# Patient Record
Sex: FEMALE | Race: WHITE | NOT HISPANIC OR LATINO
[De-identification: names, ages, dates, MRNs, and addresses within clinical notes are randomized per-mention and may not be internally consistent; named-entity substitution may affect disease eponyms.]

---

## 2017-01-04 ENCOUNTER — APPOINTMENT (OUTPATIENT)
Dept: CARDIOTHORACIC SURGERY | Facility: CLINIC | Age: 77
End: 2017-01-04

## 2017-01-04 VITALS
TEMPERATURE: 97.3 F | DIASTOLIC BLOOD PRESSURE: 61 MMHG | BODY MASS INDEX: 50.85 KG/M2 | SYSTOLIC BLOOD PRESSURE: 132 MMHG | HEART RATE: 64 BPM | OXYGEN SATURATION: 95 % | HEIGHT: 63 IN | WEIGHT: 287 LBS | RESPIRATION RATE: 16 BRPM

## 2017-01-12 ENCOUNTER — APPOINTMENT (OUTPATIENT)
Dept: CARDIOLOGY | Facility: CLINIC | Age: 77
End: 2017-01-12

## 2017-01-12 ENCOUNTER — APPOINTMENT (OUTPATIENT)
Dept: ELECTROPHYSIOLOGY | Facility: CLINIC | Age: 77
End: 2017-01-12

## 2017-01-12 VITALS — SYSTOLIC BLOOD PRESSURE: 156 MMHG | DIASTOLIC BLOOD PRESSURE: 56 MMHG | HEART RATE: 63 BPM

## 2017-01-12 RX ORDER — DOCUSATE SODIUM 100 MG/1
100 CAPSULE ORAL
Qty: 90 | Refills: 0 | Status: DISCONTINUED | COMMUNITY
Start: 2016-12-26

## 2017-01-12 RX ORDER — ACETAMINOPHEN 500 MG/1
500 TABLET, COATED ORAL
Refills: 0 | Status: ACTIVE | COMMUNITY

## 2017-01-27 ENCOUNTER — APPOINTMENT (OUTPATIENT)
Dept: CV DIAGNOSITCS | Facility: HOSPITAL | Age: 77
End: 2017-01-27

## 2017-01-27 ENCOUNTER — OUTPATIENT (OUTPATIENT)
Dept: OUTPATIENT SERVICES | Facility: HOSPITAL | Age: 77
LOS: 1 days | End: 2017-01-27
Payer: MEDICARE

## 2017-01-27 ENCOUNTER — NON-APPOINTMENT (OUTPATIENT)
Age: 77
End: 2017-01-27

## 2017-01-27 ENCOUNTER — APPOINTMENT (OUTPATIENT)
Dept: CARDIOLOGY | Facility: CLINIC | Age: 77
End: 2017-01-27

## 2017-01-27 VITALS
SYSTOLIC BLOOD PRESSURE: 136 MMHG | DIASTOLIC BLOOD PRESSURE: 78 MMHG | WEIGHT: 287 LBS | HEIGHT: 63 IN | OXYGEN SATURATION: 94 % | HEART RATE: 63 BPM | BODY MASS INDEX: 50.85 KG/M2

## 2017-01-27 DIAGNOSIS — Z98.890 OTHER SPECIFIED POSTPROCEDURAL STATES: Chronic | ICD-10-CM

## 2017-01-27 DIAGNOSIS — I35.0 NONRHEUMATIC AORTIC (VALVE) STENOSIS: ICD-10-CM

## 2017-01-27 PROCEDURE — 93306 TTE W/DOPPLER COMPLETE: CPT

## 2017-01-27 PROCEDURE — 93306 TTE W/DOPPLER COMPLETE: CPT | Mod: 26

## 2017-02-01 ENCOUNTER — MEDICATION RENEWAL (OUTPATIENT)
Age: 77
End: 2017-02-01

## 2017-02-03 ENCOUNTER — APPOINTMENT (OUTPATIENT)
Dept: ELECTROPHYSIOLOGY | Facility: CLINIC | Age: 77
End: 2017-02-03

## 2017-02-03 DIAGNOSIS — I44.0 ATRIOVENTRICULAR BLOCK, FIRST DEGREE: ICD-10-CM

## 2017-05-04 ENCOUNTER — RX RENEWAL (OUTPATIENT)
Age: 77
End: 2017-05-04

## 2017-05-11 ENCOUNTER — NON-APPOINTMENT (OUTPATIENT)
Age: 77
End: 2017-05-11

## 2017-05-11 ENCOUNTER — APPOINTMENT (OUTPATIENT)
Dept: CARDIOLOGY | Facility: CLINIC | Age: 77
End: 2017-05-11

## 2017-05-11 ENCOUNTER — APPOINTMENT (OUTPATIENT)
Dept: ELECTROPHYSIOLOGY | Facility: CLINIC | Age: 77
End: 2017-05-11

## 2017-05-11 VITALS — WEIGHT: 276.06 LBS | OXYGEN SATURATION: 97 % | BODY MASS INDEX: 48.91 KG/M2 | HEART RATE: 62 BPM | HEIGHT: 63 IN

## 2017-05-11 VITALS — DIASTOLIC BLOOD PRESSURE: 60 MMHG | SYSTOLIC BLOOD PRESSURE: 130 MMHG

## 2017-07-27 ENCOUNTER — APPOINTMENT (OUTPATIENT)
Dept: PULMONOLOGY | Facility: CLINIC | Age: 77
End: 2017-07-27
Payer: MEDICARE

## 2017-07-27 VITALS
OXYGEN SATURATION: 96 % | HEART RATE: 65 BPM | HEIGHT: 61 IN | DIASTOLIC BLOOD PRESSURE: 60 MMHG | RESPIRATION RATE: 16 BRPM | WEIGHT: 275 LBS | BODY MASS INDEX: 51.92 KG/M2 | SYSTOLIC BLOOD PRESSURE: 130 MMHG

## 2017-07-27 DIAGNOSIS — K21.9 GASTRO-ESOPHAGEAL REFLUX DISEASE W/OUT ESOPHAGITIS: ICD-10-CM

## 2017-07-27 DIAGNOSIS — J45.909 UNSPECIFIED ASTHMA, UNCOMPLICATED: ICD-10-CM

## 2017-07-27 DIAGNOSIS — J30.9 ALLERGIC RHINITIS, UNSPECIFIED: ICD-10-CM

## 2017-07-27 DIAGNOSIS — G47.33 OBSTRUCTIVE SLEEP APNEA (ADULT) (PEDIATRIC): ICD-10-CM

## 2017-07-27 DIAGNOSIS — R93.8 ABNORMAL FINDINGS ON DIAGNOSTIC IMAGING OF OTHER SPECIFIED BODY STRUCTURES: ICD-10-CM

## 2017-07-27 PROCEDURE — 94010 BREATHING CAPACITY TEST: CPT | Mod: 59

## 2017-07-27 PROCEDURE — 99214 OFFICE O/P EST MOD 30 MIN: CPT | Mod: 25

## 2017-07-27 PROCEDURE — 94620 PULMONARY STRESS TESTING SIMPLE: CPT

## 2017-07-27 PROCEDURE — 95012 NITRIC OXIDE EXP GAS DETER: CPT

## 2017-07-27 RX ORDER — PANTOPRAZOLE SODIUM 40 MG/1
40 TABLET, DELAYED RELEASE ORAL
Qty: 1 | Refills: 1 | Status: ACTIVE | COMMUNITY
Start: 2017-07-27 | End: 1900-01-01

## 2017-08-05 ENCOUNTER — RX RENEWAL (OUTPATIENT)
Age: 77
End: 2017-08-05

## 2017-08-09 ENCOUNTER — RX RENEWAL (OUTPATIENT)
Age: 77
End: 2017-08-09

## 2017-08-17 ENCOUNTER — FORM ENCOUNTER (OUTPATIENT)
Age: 77
End: 2017-08-17

## 2017-08-18 ENCOUNTER — APPOINTMENT (OUTPATIENT)
Dept: CT IMAGING | Facility: IMAGING CENTER | Age: 77
End: 2017-08-18
Payer: MEDICARE

## 2017-08-18 ENCOUNTER — OUTPATIENT (OUTPATIENT)
Dept: OUTPATIENT SERVICES | Facility: HOSPITAL | Age: 77
LOS: 1 days | End: 2017-08-18
Payer: MEDICARE

## 2017-08-18 DIAGNOSIS — Z98.890 OTHER SPECIFIED POSTPROCEDURAL STATES: Chronic | ICD-10-CM

## 2017-08-18 DIAGNOSIS — R93.8 ABNORMAL FINDINGS ON DIAGNOSTIC IMAGING OF OTHER SPECIFIED BODY STRUCTURES: ICD-10-CM

## 2017-08-18 PROCEDURE — 71250 CT THORAX DX C-: CPT

## 2017-08-18 PROCEDURE — 71250 CT THORAX DX C-: CPT | Mod: 26

## 2017-08-21 ENCOUNTER — APPOINTMENT (OUTPATIENT)
Dept: ELECTROPHYSIOLOGY | Facility: CLINIC | Age: 77
End: 2017-08-21
Payer: MEDICARE

## 2017-08-21 VITALS — DIASTOLIC BLOOD PRESSURE: 60 MMHG | SYSTOLIC BLOOD PRESSURE: 132 MMHG | HEART RATE: 66 BPM

## 2017-08-21 PROCEDURE — 93280 PM DEVICE PROGR EVAL DUAL: CPT

## 2017-08-25 ENCOUNTER — RX RENEWAL (OUTPATIENT)
Age: 77
End: 2017-08-25

## 2017-10-05 ENCOUNTER — MEDICATION RENEWAL (OUTPATIENT)
Age: 77
End: 2017-10-05

## 2017-10-05 ENCOUNTER — APPOINTMENT (OUTPATIENT)
Dept: CARDIOLOGY | Facility: CLINIC | Age: 77
End: 2017-10-05
Payer: MEDICARE

## 2017-10-05 ENCOUNTER — NON-APPOINTMENT (OUTPATIENT)
Age: 77
End: 2017-10-05

## 2017-10-05 VITALS
HEART RATE: 60 BPM | WEIGHT: 273 LBS | OXYGEN SATURATION: 98 % | DIASTOLIC BLOOD PRESSURE: 77 MMHG | HEIGHT: 61 IN | BODY MASS INDEX: 51.54 KG/M2 | SYSTOLIC BLOOD PRESSURE: 144 MMHG | RESPIRATION RATE: 16 BRPM

## 2017-10-05 PROCEDURE — 99215 OFFICE O/P EST HI 40 MIN: CPT

## 2017-10-05 PROCEDURE — 93000 ELECTROCARDIOGRAM COMPLETE: CPT

## 2017-10-05 RX ORDER — SIMVASTATIN 20 MG/1
20 TABLET, FILM COATED ORAL DAILY
Qty: 90 | Refills: 3 | Status: ACTIVE | COMMUNITY
Start: 1900-01-01 | End: 1900-01-01

## 2017-11-08 ENCOUNTER — MEDICATION RENEWAL (OUTPATIENT)
Age: 77
End: 2017-11-08

## 2017-11-30 ENCOUNTER — APPOINTMENT (OUTPATIENT)
Dept: ELECTROPHYSIOLOGY | Facility: CLINIC | Age: 77
End: 2017-11-30
Payer: MEDICARE

## 2017-11-30 DIAGNOSIS — I44.7 LEFT BUNDLE-BRANCH BLOCK, UNSPECIFIED: ICD-10-CM

## 2017-11-30 PROCEDURE — 93294 REM INTERROG EVL PM/LDLS PM: CPT

## 2017-11-30 PROCEDURE — 93296 REM INTERROG EVL PM/IDS: CPT

## 2017-12-01 ENCOUNTER — OTHER (OUTPATIENT)
Age: 77
End: 2017-12-01

## 2017-12-01 ENCOUNTER — APPOINTMENT (OUTPATIENT)
Dept: CV DIAGNOSITCS | Facility: HOSPITAL | Age: 77
End: 2017-12-01

## 2017-12-08 ENCOUNTER — MEDICATION RENEWAL (OUTPATIENT)
Age: 77
End: 2017-12-08

## 2017-12-12 PROBLEM — I44.7 LBBB (LEFT BUNDLE BRANCH BLOCK): Status: ACTIVE | Noted: 2017-01-12

## 2017-12-19 ENCOUNTER — OUTPATIENT (OUTPATIENT)
Dept: OUTPATIENT SERVICES | Facility: HOSPITAL | Age: 77
LOS: 1 days | End: 2017-12-19

## 2017-12-19 ENCOUNTER — APPOINTMENT (OUTPATIENT)
Dept: CV DIAGNOSITCS | Facility: HOSPITAL | Age: 77
End: 2017-12-19

## 2017-12-19 DIAGNOSIS — I25.10 ATHEROSCLEROTIC HEART DISEASE OF NATIVE CORONARY ARTERY WITHOUT ANGINA PECTORIS: ICD-10-CM

## 2017-12-19 DIAGNOSIS — Z98.890 OTHER SPECIFIED POSTPROCEDURAL STATES: Chronic | ICD-10-CM

## 2017-12-19 PROCEDURE — 93306 TTE W/DOPPLER COMPLETE: CPT | Mod: 26

## 2017-12-21 ENCOUNTER — INPATIENT (INPATIENT)
Facility: HOSPITAL | Age: 77
LOS: 11 days | Discharge: NURSING, REHAB OR LONG-TERM CARE | End: 2018-01-02
Attending: HOSPITALIST | Admitting: HOSPITALIST
Payer: MEDICARE

## 2017-12-21 VITALS
RESPIRATION RATE: 18 BRPM | HEART RATE: 90 BPM | OXYGEN SATURATION: 99 % | SYSTOLIC BLOOD PRESSURE: 136 MMHG | DIASTOLIC BLOOD PRESSURE: 71 MMHG | TEMPERATURE: 100 F

## 2017-12-21 DIAGNOSIS — Z98.890 OTHER SPECIFIED POSTPROCEDURAL STATES: Chronic | ICD-10-CM

## 2017-12-21 LAB
BASE EXCESS BLDV CALC-SCNC: -0.1 MMOL/L — SIGNIFICANT CHANGE UP
BASOPHILS # BLD AUTO: 0.02 K/UL — SIGNIFICANT CHANGE UP (ref 0–0.2)
BASOPHILS NFR BLD AUTO: 0.2 % — SIGNIFICANT CHANGE UP (ref 0–2)
BLOOD GAS VENOUS - CREATININE: 1.22 MG/DL — SIGNIFICANT CHANGE UP (ref 0.5–1.3)
CHLORIDE BLDV-SCNC: 104 MMOL/L — SIGNIFICANT CHANGE UP (ref 96–108)
EOSINOPHIL # BLD AUTO: 0 K/UL — SIGNIFICANT CHANGE UP (ref 0–0.5)
EOSINOPHIL NFR BLD AUTO: 0 % — SIGNIFICANT CHANGE UP (ref 0–6)
GAS PNL BLDV: 135 MMOL/L — LOW (ref 136–146)
GLUCOSE BLDV-MCNC: 100 — HIGH (ref 70–99)
HCO3 BLDV-SCNC: 24 MMOL/L — SIGNIFICANT CHANGE UP (ref 20–27)
HCT VFR BLD CALC: 32.4 % — LOW (ref 34.5–45)
HCT VFR BLDV CALC: 31.5 % — LOW (ref 34.5–45)
HGB BLD-MCNC: 10.2 G/DL — LOW (ref 11.5–15.5)
HGB BLDV-MCNC: 10.2 G/DL — LOW (ref 11.5–15.5)
IMM GRANULOCYTES # BLD AUTO: 0.02 # — SIGNIFICANT CHANGE UP
IMM GRANULOCYTES NFR BLD AUTO: 0.2 % — SIGNIFICANT CHANGE UP (ref 0–1.5)
LACTATE BLDV-MCNC: 1.1 MMOL/L — SIGNIFICANT CHANGE UP (ref 0.5–2)
LYMPHOCYTES # BLD AUTO: 1.36 K/UL — SIGNIFICANT CHANGE UP (ref 1–3.3)
LYMPHOCYTES # BLD AUTO: 15.5 % — SIGNIFICANT CHANGE UP (ref 13–44)
MCHC RBC-ENTMCNC: 25.8 PG — LOW (ref 27–34)
MCHC RBC-ENTMCNC: 31.5 % — LOW (ref 32–36)
MCV RBC AUTO: 82 FL — SIGNIFICANT CHANGE UP (ref 80–100)
MONOCYTES # BLD AUTO: 0.41 K/UL — SIGNIFICANT CHANGE UP (ref 0–0.9)
MONOCYTES NFR BLD AUTO: 4.7 % — SIGNIFICANT CHANGE UP (ref 2–14)
NEUTROPHILS # BLD AUTO: 6.95 K/UL — SIGNIFICANT CHANGE UP (ref 1.8–7.4)
NEUTROPHILS NFR BLD AUTO: 79.4 % — HIGH (ref 43–77)
NRBC # FLD: 0 — SIGNIFICANT CHANGE UP
PCO2 BLDV: 44 MMHG — SIGNIFICANT CHANGE UP (ref 41–51)
PH BLDV: 7.37 PH — SIGNIFICANT CHANGE UP (ref 7.32–7.43)
PLATELET # BLD AUTO: 134 K/UL — LOW (ref 150–400)
PMV BLD: 11.5 FL — SIGNIFICANT CHANGE UP (ref 7–13)
PO2 BLDV: 33 MMHG — LOW (ref 35–40)
POTASSIUM BLDV-SCNC: 5 MMOL/L — HIGH (ref 3.4–4.5)
RBC # BLD: 3.95 M/UL — SIGNIFICANT CHANGE UP (ref 3.8–5.2)
RBC # FLD: 15.9 % — HIGH (ref 10.3–14.5)
SAO2 % BLDV: 56.3 % — LOW (ref 60–85)
WBC # BLD: 8.76 K/UL — SIGNIFICANT CHANGE UP (ref 3.8–10.5)
WBC # FLD AUTO: 8.76 K/UL — SIGNIFICANT CHANGE UP (ref 3.8–10.5)

## 2017-12-21 RX ORDER — ACETAMINOPHEN 500 MG
1000 TABLET ORAL ONCE
Qty: 0 | Refills: 0 | Status: COMPLETED | OUTPATIENT
Start: 2017-12-21 | End: 2017-12-21

## 2017-12-21 RX ORDER — PIPERACILLIN AND TAZOBACTAM 4; .5 G/20ML; G/20ML
3.38 INJECTION, POWDER, LYOPHILIZED, FOR SOLUTION INTRAVENOUS ONCE
Qty: 0 | Refills: 0 | Status: COMPLETED | OUTPATIENT
Start: 2017-12-21 | End: 2017-12-22

## 2017-12-21 RX ORDER — VANCOMYCIN HCL 1 G
1000 VIAL (EA) INTRAVENOUS ONCE
Qty: 0 | Refills: 0 | Status: COMPLETED | OUTPATIENT
Start: 2017-12-21 | End: 2017-12-22

## 2017-12-21 RX ORDER — SODIUM CHLORIDE 9 MG/ML
500 INJECTION INTRAMUSCULAR; INTRAVENOUS; SUBCUTANEOUS ONCE
Qty: 0 | Refills: 0 | Status: COMPLETED | OUTPATIENT
Start: 2017-12-21 | End: 2017-12-21

## 2017-12-21 RX ADMIN — Medication 400 MILLIGRAM(S): at 23:22

## 2017-12-21 RX ADMIN — SODIUM CHLORIDE 500 MILLILITER(S): 9 INJECTION INTRAMUSCULAR; INTRAVENOUS; SUBCUTANEOUS at 23:22

## 2017-12-21 NOTE — ED PROVIDER NOTE - PROGRESS NOTE DETAILS
Marquez CHINCHILLA: Spoke with orthopedics who recommended adding ESR and CRP.  Requested we rule-out other etiologies of her fever before calling them for a consult. Marquez CHINCHILLA: Spoke with orthopedics who recommended adding ESR and CRP. Marquez CHINCHILLA: Ortho came down and assessed the patient - recommended rule-out of other etiologies of her fever.  If no other cause of fever is found; he would have to talk with the attending and contact the prior surgeon before doing a knee tap for evaluation of a septic joint. Derekfish: ED w/u non-revealing for source of fever. Pt still c/o knee pain. d/w ortho resident dedrick, he thinks low likelihood of septic joint given normal WBC and exam. Pt though has pain and warmth to joint. Able to have FROM but with pain. Given lack of other sources (though still may be developing viral infection) I requested attg input. Resident did not want to call attg In middle off night and wanted to wait until morning. I left message for answering service for dr. maynard to attempt to contact ortho attg directly. Rashawn: d/w dr. contreras, agrees that pt doesn't need immediate tap, recommending medicine admit. d/w hospitalist, text paged mar, updated pt. Derekfish: ED w/u non-revealing for source of fever. Pt still c/o knee pain. d/w ortho resident dedrick, he thinks low likelihood of septic joint given normal WBC and exam. Pt though has pain and warmth to joint. Able to have FROM but with pain. Given lack of other sources (though still may be developing viral infection) I requested attg input. Resident didn't feel it was necessary to call attg In middle off night and wanted to wait until morning. I left message for answering service for dr. maynard to attempt to contact ortho attg directly.

## 2017-12-21 NOTE — ED PROVIDER NOTE - MEDICAL DECISION MAKING DETAILS
77 year-old female with history of morbidly obesity (52), HTN, HLD (no medication), severe AS status-post TAVR, Asthma, GERD, sciatica, macular degeneration, OA with multiple joint surgeries, polymyalgia (on daily prednisone) presents to the Emergency Department for shortness of breath.  DDx: PNA, ACS, PE, acute HF.  With a warm right knee; consider a septic joint vs inflamed arthritic joint.

## 2017-12-21 NOTE — ED PROVIDER NOTE - PMH
Anxiety    Aortic stenosis, severe    CAD (coronary artery disease)  HERBERT to LAD 11/2016  Dysphagia    GERD (gastroesophageal reflux disease)    Hiatal hernia    Lumbar disc disease with radiculopathy    Macular degeneration    Morbid obesity with BMI of 50.0-59.9, adult    Osteoarthritis    Polymyalgia    Sciatica    Severe aortic stenosis by prior echocardiogram  2013 and  11/2016  Spider veins    Uncomplicated asthma, unspecified asthma severity

## 2017-12-21 NOTE — ED ADULT NURSE NOTE - PSH
H/O cardiac catheterization  11/29/16 HERBERT placed on LAD  H/O endoscopy  with dilatation of esophageal stricture 2013  S/P cataract surgery  x2; 3-4yr ago  S/P cholecystectomy  more than 15 years ago  S/P gastroplasty  28 yrs ago  S/P hysterectomy  24yr ago  S/P total knee replacement  left, 15yr ago  S/P total knee replacement  right, 12yr ago

## 2017-12-21 NOTE — ED PROVIDER NOTE - OBJECTIVE STATEMENT
77 year-old female with history of morbidly obesity (52), HTN, HLD (no medication), severe AS status-post TAVR, Asthma, GERD, sciatica, macular degeneration, OA with multiple joint surgeries, polymyalgia (on daily prednisone) presents to the Emergency Department for shortness of breath. 77 year-old female with history of morbidly obesity (52), HTN, HLD (no medication), severe AS status-post TAVR, Asthma, GERD, sciatica, macular degeneration, OA with multiple joint surgeries, polymyalgia (on daily prednisone) presents to the Emergency Department for shortness of breath.  Patient mentions worsening shortness of breath ongoing for the past 2 days with increased dyspnea on exertion.  No associated nausea, vomiting, chest pain, abdominal pain, dysuria/hematuria.  Patient developed a fever this AM.  Patient also developed sudden right knee pain with trouble ambulation.  Recent sick contact of her  who had a cold. 77 year-old female with history of morbidly obesity (52), HTN, HLD (no medication), severe AS status-post TAVR, Asthma, GERD, sciatica, macular degeneration, OA with multiple joint surgeries, polymyalgia (on daily prednisone) presents to the Emergency Department for shortness of breath.  Patient mentions worsening shortness of breath ongoing for the past 2 days with increased dyspnea on exertion.  No associated nausea, vomiting, chest pain, abdominal pain, dysuria/hematuria.  Patient developed a fever this AM.  Patient also developed sudden right knee pain with trouble ambulation.  Recent sick contact of her  who had a cold.  Last Echo done on 12/19 which was largely WNL given patient's history.  Cardiologist: Dr. Roblero 77 year-old female with history of morbidly obesity (52), HTN, HLD (no medication), severe AS status-post TAVR, Asthma, GERD, sciatica, macular degeneration, OA with multiple joint surgeries, polymyalgia (on daily prednisone) presents to the Emergency Department for shortness of breath fever and knee pain.  Patient mentions worsening shortness of breath ongoing for the past 2 days with increased dyspnea on exertion.  No associated nausea, vomiting, chest pain, abdominal pain, dysuria/hematuria.  Patient developed a fever this AM.  Patient also developed sudden right knee pain with trouble ambulation.  Recent sick contact of her  who had a cold.  Last Echo done on 12/19 which was largely WNL given patient's history.  Cardiologist: Dr. Roblero

## 2017-12-21 NOTE — ED ADULT TRIAGE NOTE - CHIEF COMPLAINT QUOTE
Patient arrives via ems from home , patient with a h/o asthma c/o feeling tired since yesterday and sob today. Her  was worried about  her so he called EMS. Appears in no distress in triage.

## 2017-12-21 NOTE — ED PROVIDER NOTE - MUSCULOSKELETAL MINIMAL EXAM
right knee with preserved ROM; however has overlying warmth compared to the left knee with some TTP on medial aspect right knee with limited ROM 2/2 pain; however has overlying warmth compared to the left knee with some TTP on medial aspect

## 2017-12-21 NOTE — ED PROVIDER NOTE - ATTENDING CONTRIBUTION TO CARE
Patient with h/o tkr presents with fever, sob, knee pain. Has intermittent sob associated with fever earlier today, which has now resolved. Developed r. knee pain/swelling/warmth this morning that has progressivel worsened, with pain on movement of knee. No ha, cp, cough, abd pain, vomiting, diarrhea, dysuria, sneezing, congestion.  exam  GEN - NAD; well appearing; A+O x3   HEAD - NC/AT   EYES- PERRL, EOMI  ENT: Airway patent, mmm, Oral cavity and pharynx normal. No inflammation, swelling, exudate, or lesions.  NECK: Neck supple, non-tender without lymphadenopathy, no masses.  PULMONARY - CTA b/l, symmetric breath sounds.   CARDIAC -s1s2, RRR, no M,G,R  ABDOMEN - +BS, ND, NT, soft, no guarding, no rebound, no masses   BACK - no CVA tenderness, Normal  spine   EXTREMITIES - R. knee with overlying warmth, able to passively range with reproducible pain, mild ttp medial region, +swelling, + symmetric pulses, capillary refill < 2 seconds  SKIN - no rash or bruising   NEUROLOGIC - alert, speech clear, no focal deficits  PSYCH -nl mood/affect, nl insight.  a/p-fever, knee pain, ep of sob which has now resolved, r. knee warm/swollen, able to range but with pain, will check labs, ua, rvp, hydrate, cxr/knee xr, ortho consult for possible knee tap to r/o septic joint, monitor, reass-patient stable and comfortable.

## 2017-12-22 ENCOUNTER — TRANSCRIPTION ENCOUNTER (OUTPATIENT)
Age: 77
End: 2017-12-22

## 2017-12-22 ENCOUNTER — APPOINTMENT (OUTPATIENT)
Dept: CARDIOLOGY | Facility: CLINIC | Age: 77
End: 2017-12-22

## 2017-12-22 DIAGNOSIS — R06.09 OTHER FORMS OF DYSPNEA: ICD-10-CM

## 2017-12-22 DIAGNOSIS — E78.5 HYPERLIPIDEMIA, UNSPECIFIED: ICD-10-CM

## 2017-12-22 DIAGNOSIS — K21.9 GASTRO-ESOPHAGEAL REFLUX DISEASE WITHOUT ESOPHAGITIS: ICD-10-CM

## 2017-12-22 DIAGNOSIS — I10 ESSENTIAL (PRIMARY) HYPERTENSION: ICD-10-CM

## 2017-12-22 DIAGNOSIS — J45.909 UNSPECIFIED ASTHMA, UNCOMPLICATED: ICD-10-CM

## 2017-12-22 DIAGNOSIS — Z29.9 ENCOUNTER FOR PROPHYLACTIC MEASURES, UNSPECIFIED: ICD-10-CM

## 2017-12-22 DIAGNOSIS — R50.9 FEVER, UNSPECIFIED: ICD-10-CM

## 2017-12-22 DIAGNOSIS — I25.10 ATHEROSCLEROTIC HEART DISEASE OF NATIVE CORONARY ARTERY WITHOUT ANGINA PECTORIS: ICD-10-CM

## 2017-12-22 DIAGNOSIS — M25.561 PAIN IN RIGHT KNEE: ICD-10-CM

## 2017-12-22 LAB
ALBUMIN SERPL ELPH-MCNC: 3.4 G/DL — SIGNIFICANT CHANGE UP (ref 3.3–5)
ALP SERPL-CCNC: 83 U/L — SIGNIFICANT CHANGE UP (ref 40–120)
ALT FLD-CCNC: 16 U/L — SIGNIFICANT CHANGE UP (ref 4–33)
ANTIBODY ID 1_1: SIGNIFICANT CHANGE UP
APPEARANCE UR: CLEAR — SIGNIFICANT CHANGE UP
APTT BLD: 38.3 SEC — HIGH (ref 27.5–37.4)
AST SERPL-CCNC: 30 U/L — SIGNIFICANT CHANGE UP (ref 4–32)
B PERT DNA SPEC QL NAA+PROBE: SIGNIFICANT CHANGE UP
BACTERIA # UR AUTO: SIGNIFICANT CHANGE UP
BILIRUB SERPL-MCNC: 0.2 MG/DL — SIGNIFICANT CHANGE UP (ref 0.2–1.2)
BILIRUB UR-MCNC: NEGATIVE — SIGNIFICANT CHANGE UP
BLD GP AB SCN SERPL QL: POSITIVE — SIGNIFICANT CHANGE UP
BLOOD UR QL VISUAL: HIGH
BODY FLUID TYPE: SIGNIFICANT CHANGE UP
BUN SERPL-MCNC: 21 MG/DL — SIGNIFICANT CHANGE UP (ref 7–23)
C PNEUM DNA SPEC QL NAA+PROBE: NOT DETECTED — SIGNIFICANT CHANGE UP
CALCIUM SERPL-MCNC: 8.7 MG/DL — SIGNIFICANT CHANGE UP (ref 8.4–10.5)
CHLORIDE SERPL-SCNC: 103 MMOL/L — SIGNIFICANT CHANGE UP (ref 98–107)
CLARITY SPEC: SIGNIFICANT CHANGE UP
CO2 SERPL-SCNC: 23 MMOL/L — SIGNIFICANT CHANGE UP (ref 22–31)
COLOR FLD: SIGNIFICANT CHANGE UP
COLOR SPEC: YELLOW — SIGNIFICANT CHANGE UP
CREAT SERPL-MCNC: 1.19 MG/DL — SIGNIFICANT CHANGE UP (ref 0.5–1.3)
CRP SERPL-MCNC: 114.3 MG/L — HIGH
CRYSTALS FLD MICRO: SIGNIFICANT CHANGE UP
DAT POLY-SP REAG RBC QL: NEGATIVE — SIGNIFICANT CHANGE UP
EOSINOPHIL # FLD: 1 % — SIGNIFICANT CHANGE UP
ERYTHROCYTE [SEDIMENTATION RATE] IN BLOOD: 74 MM/HR — HIGH (ref 4–25)
FLUAV H1 2009 PAND RNA SPEC QL NAA+PROBE: NOT DETECTED — SIGNIFICANT CHANGE UP
FLUAV H1 RNA SPEC QL NAA+PROBE: NOT DETECTED — SIGNIFICANT CHANGE UP
FLUAV H3 RNA SPEC QL NAA+PROBE: NOT DETECTED — SIGNIFICANT CHANGE UP
FLUAV SUBTYP SPEC NAA+PROBE: SIGNIFICANT CHANGE UP
FLUBV RNA SPEC QL NAA+PROBE: NOT DETECTED — SIGNIFICANT CHANGE UP
GLUCOSE SERPL-MCNC: 99 MG/DL — SIGNIFICANT CHANGE UP (ref 70–99)
GLUCOSE UR-MCNC: NEGATIVE — SIGNIFICANT CHANGE UP
GRAM STN FLD: SIGNIFICANT CHANGE UP
HADV DNA SPEC QL NAA+PROBE: NOT DETECTED — SIGNIFICANT CHANGE UP
HCOV 229E RNA SPEC QL NAA+PROBE: NOT DETECTED — SIGNIFICANT CHANGE UP
HCOV HKU1 RNA SPEC QL NAA+PROBE: NOT DETECTED — SIGNIFICANT CHANGE UP
HCOV NL63 RNA SPEC QL NAA+PROBE: NOT DETECTED — SIGNIFICANT CHANGE UP
HCOV OC43 RNA SPEC QL NAA+PROBE: NOT DETECTED — SIGNIFICANT CHANGE UP
HMPV RNA SPEC QL NAA+PROBE: NOT DETECTED — SIGNIFICANT CHANGE UP
HPIV1 RNA SPEC QL NAA+PROBE: NOT DETECTED — SIGNIFICANT CHANGE UP
HPIV2 RNA SPEC QL NAA+PROBE: NOT DETECTED — SIGNIFICANT CHANGE UP
HPIV3 RNA SPEC QL NAA+PROBE: NOT DETECTED — SIGNIFICANT CHANGE UP
HPIV4 RNA SPEC QL NAA+PROBE: NOT DETECTED — SIGNIFICANT CHANGE UP
HYALINE CASTS # UR AUTO: SIGNIFICANT CHANGE UP (ref 0–?)
INR BLD: 1.21 — HIGH (ref 0.88–1.17)
KETONES UR-MCNC: NEGATIVE — SIGNIFICANT CHANGE UP
LEUKOCYTE ESTERASE UR-ACNC: NEGATIVE — SIGNIFICANT CHANGE UP
LYMPHOCYTES NFR FLD: 4 % — SIGNIFICANT CHANGE UP
M PNEUMO DNA SPEC QL NAA+PROBE: NOT DETECTED — SIGNIFICANT CHANGE UP
MONOCYTES # FLD: 8 % — SIGNIFICANT CHANGE UP
MUCOUS THREADS # UR AUTO: SIGNIFICANT CHANGE UP
NEUTS SEG NFR FLD MANUAL: 87 % — SIGNIFICANT CHANGE UP
NITRITE UR-MCNC: NEGATIVE — SIGNIFICANT CHANGE UP
NON-SQ EPI CELLS # UR AUTO: <1 — SIGNIFICANT CHANGE UP
PH UR: 6 — SIGNIFICANT CHANGE UP (ref 4.6–8)
POTASSIUM SERPL-MCNC: 5.2 MMOL/L — SIGNIFICANT CHANGE UP (ref 3.5–5.3)
POTASSIUM SERPL-SCNC: 5.2 MMOL/L — SIGNIFICANT CHANGE UP (ref 3.5–5.3)
PROT SERPL-MCNC: 7.1 G/DL — SIGNIFICANT CHANGE UP (ref 6–8.3)
PROT UR-MCNC: 100 MG/DL — HIGH
PROTHROM AB SERPL-ACNC: 14 SEC — HIGH (ref 9.8–13.1)
RBC CASTS # UR COMP ASSIST: SIGNIFICANT CHANGE UP (ref 0–?)
RCV VOL RI: HIGH CELL/UL (ref 0–5)
RH IG SCN BLD-IMP: POSITIVE — SIGNIFICANT CHANGE UP
RH IG SCN BLD-IMP: POSITIVE — SIGNIFICANT CHANGE UP
RSV RNA SPEC QL NAA+PROBE: NOT DETECTED — SIGNIFICANT CHANGE UP
RV+EV RNA SPEC QL NAA+PROBE: NOT DETECTED — SIGNIFICANT CHANGE UP
SODIUM SERPL-SCNC: 140 MMOL/L — SIGNIFICANT CHANGE UP (ref 135–145)
SP GR SPEC: 1.02 — SIGNIFICANT CHANGE UP (ref 1–1.04)
SPECIMEN SOURCE: SIGNIFICANT CHANGE UP
SQUAMOUS # UR AUTO: SIGNIFICANT CHANGE UP
TOTAL CELLS COUNTED, BODY FLUID: 100 CELLS — SIGNIFICANT CHANGE UP
TOTAL NUCLEATED CELL COUNT, BODY FLUID: HIGH CELL/UL (ref 0–5)
UROBILINOGEN FLD QL: NORMAL MG/DL — SIGNIFICANT CHANGE UP
WBC UR QL: SIGNIFICANT CHANGE UP (ref 0–?)

## 2017-12-22 PROCEDURE — 99223 1ST HOSP IP/OBS HIGH 75: CPT | Mod: GC

## 2017-12-22 PROCEDURE — 71010: CPT | Mod: 26

## 2017-12-22 PROCEDURE — 86077 PHYS BLOOD BANK SERV XMATCH: CPT

## 2017-12-22 PROCEDURE — 93010 ELECTROCARDIOGRAM REPORT: CPT

## 2017-12-22 PROCEDURE — 99223 1ST HOSP IP/OBS HIGH 75: CPT

## 2017-12-22 PROCEDURE — 99232 SBSQ HOSP IP/OBS MODERATE 35: CPT | Mod: 57,GC

## 2017-12-22 PROCEDURE — 73562 X-RAY EXAM OF KNEE 3: CPT | Mod: 26,RT

## 2017-12-22 RX ORDER — LANOLIN ALCOHOL/MO/W.PET/CERES
3 CREAM (GRAM) TOPICAL AT BEDTIME
Qty: 0 | Refills: 0 | Status: DISCONTINUED | OUTPATIENT
Start: 2017-12-22 | End: 2017-12-28

## 2017-12-22 RX ORDER — PIPERACILLIN AND TAZOBACTAM 4; .5 G/20ML; G/20ML
3.38 INJECTION, POWDER, LYOPHILIZED, FOR SOLUTION INTRAVENOUS EVERY 8 HOURS
Qty: 0 | Refills: 0 | Status: DISCONTINUED | OUTPATIENT
Start: 2017-12-22 | End: 2017-12-24

## 2017-12-22 RX ORDER — ENOXAPARIN SODIUM 100 MG/ML
40 INJECTION SUBCUTANEOUS EVERY 24 HOURS
Qty: 0 | Refills: 0 | Status: DISCONTINUED | OUTPATIENT
Start: 2017-12-22 | End: 2017-12-22

## 2017-12-22 RX ORDER — SIMVASTATIN 20 MG/1
20 TABLET, FILM COATED ORAL AT BEDTIME
Qty: 0 | Refills: 0 | Status: DISCONTINUED | OUTPATIENT
Start: 2017-12-22 | End: 2018-01-02

## 2017-12-22 RX ORDER — MORPHINE SULFATE 50 MG/1
4 CAPSULE, EXTENDED RELEASE ORAL ONCE
Qty: 0 | Refills: 0 | Status: DISCONTINUED | OUTPATIENT
Start: 2017-12-22 | End: 2017-12-22

## 2017-12-22 RX ORDER — OXYCODONE AND ACETAMINOPHEN 5; 325 MG/1; MG/1
1 TABLET ORAL EVERY 6 HOURS
Qty: 0 | Refills: 0 | Status: DISCONTINUED | OUTPATIENT
Start: 2017-12-22 | End: 2017-12-23

## 2017-12-22 RX ORDER — DOCUSATE SODIUM 100 MG
100 CAPSULE ORAL
Qty: 0 | Refills: 0 | Status: DISCONTINUED | OUTPATIENT
Start: 2017-12-22 | End: 2018-01-02

## 2017-12-22 RX ORDER — ACETAMINOPHEN 500 MG
650 TABLET ORAL EVERY 6 HOURS
Qty: 0 | Refills: 0 | Status: DISCONTINUED | OUTPATIENT
Start: 2017-12-22 | End: 2017-12-27

## 2017-12-22 RX ORDER — LISINOPRIL 2.5 MG/1
10 TABLET ORAL DAILY
Qty: 0 | Refills: 0 | Status: DISCONTINUED | OUTPATIENT
Start: 2017-12-22 | End: 2017-12-24

## 2017-12-22 RX ORDER — CLOPIDOGREL BISULFATE 75 MG/1
75 TABLET, FILM COATED ORAL DAILY
Qty: 0 | Refills: 0 | Status: DISCONTINUED | OUTPATIENT
Start: 2017-12-22 | End: 2017-12-22

## 2017-12-22 RX ORDER — BUDESONIDE AND FORMOTEROL FUMARATE DIHYDRATE 160; 4.5 UG/1; UG/1
2 AEROSOL RESPIRATORY (INHALATION)
Qty: 0 | Refills: 0 | Status: DISCONTINUED | OUTPATIENT
Start: 2017-12-22 | End: 2018-01-02

## 2017-12-22 RX ORDER — VANCOMYCIN HCL 1 G
1250 VIAL (EA) INTRAVENOUS EVERY 12 HOURS
Qty: 0 | Refills: 0 | Status: DISCONTINUED | OUTPATIENT
Start: 2017-12-22 | End: 2017-12-23

## 2017-12-22 RX ORDER — NYSTATIN CREAM 100000 [USP'U]/G
1 CREAM TOPICAL
Qty: 0 | Refills: 0 | Status: DISCONTINUED | OUTPATIENT
Start: 2017-12-22 | End: 2018-01-02

## 2017-12-22 RX ORDER — KETOROLAC TROMETHAMINE 30 MG/ML
15 SYRINGE (ML) INJECTION ONCE
Qty: 0 | Refills: 0 | Status: DISCONTINUED | OUTPATIENT
Start: 2017-12-22 | End: 2017-12-22

## 2017-12-22 RX ORDER — PANTOPRAZOLE SODIUM 20 MG/1
40 TABLET, DELAYED RELEASE ORAL
Qty: 0 | Refills: 0 | Status: DISCONTINUED | OUTPATIENT
Start: 2017-12-22 | End: 2018-01-02

## 2017-12-22 RX ADMIN — BUDESONIDE AND FORMOTEROL FUMARATE DIHYDRATE 2 PUFF(S): 160; 4.5 AEROSOL RESPIRATORY (INHALATION) at 09:59

## 2017-12-22 RX ADMIN — Medication 166.67 MILLIGRAM(S): at 09:59

## 2017-12-22 RX ADMIN — CLOPIDOGREL BISULFATE 75 MILLIGRAM(S): 75 TABLET, FILM COATED ORAL at 12:06

## 2017-12-22 RX ADMIN — ENOXAPARIN SODIUM 40 MILLIGRAM(S): 100 INJECTION SUBCUTANEOUS at 10:00

## 2017-12-22 RX ADMIN — PIPERACILLIN AND TAZOBACTAM 200 GRAM(S): 4; .5 INJECTION, POWDER, LYOPHILIZED, FOR SOLUTION INTRAVENOUS at 00:02

## 2017-12-22 RX ADMIN — Medication 166.67 MILLIGRAM(S): at 21:38

## 2017-12-22 RX ADMIN — Medication 100 MILLIGRAM(S): at 06:37

## 2017-12-22 RX ADMIN — PIPERACILLIN AND TAZOBACTAM 25 GRAM(S): 4; .5 INJECTION, POWDER, LYOPHILIZED, FOR SOLUTION INTRAVENOUS at 18:06

## 2017-12-22 RX ADMIN — Medication 250 MILLIGRAM(S): at 01:28

## 2017-12-22 RX ADMIN — PANTOPRAZOLE SODIUM 40 MILLIGRAM(S): 20 TABLET, DELAYED RELEASE ORAL at 06:37

## 2017-12-22 RX ADMIN — BUDESONIDE AND FORMOTEROL FUMARATE DIHYDRATE 2 PUFF(S): 160; 4.5 AEROSOL RESPIRATORY (INHALATION) at 21:39

## 2017-12-22 RX ADMIN — PIPERACILLIN AND TAZOBACTAM 25 GRAM(S): 4; .5 INJECTION, POWDER, LYOPHILIZED, FOR SOLUTION INTRAVENOUS at 09:59

## 2017-12-22 RX ADMIN — LISINOPRIL 10 MILLIGRAM(S): 2.5 TABLET ORAL at 06:37

## 2017-12-22 NOTE — PROGRESS NOTE ADULT - ASSESSMENT
77F with chronic dyspnea, aortic stenosis s/p TAVR, bilateral knee OA s/p b/l TKA, and other medical comorbidities admitted for worsening dyspnea and acute right knee pain. Patient not in respiratory distress. Knee exam findings, aspiration significant for gross blood, and inflammatory marker elevation concerning for septic arthritis. Per ortho, pt likely going to OR tomorrow for antibiotic spacer placement vs possible revision total knee arthroplasty.  Cardio following as well, recs appreciated. Pt awaiting clearance, will be made NPO after midnight in anticipation of procedure.

## 2017-12-22 NOTE — H&P ADULT - PROBLEM SELECTOR PLAN 2
- possibly musculoskeletal vs. infectious etiology; see plan as noted above  - ambulate with assistance, fall risk protocol  - f/u Orthopaedic Surgery recs  - PT consult - etiology multifactorial including poor exercise tolerance vs. asthma  - heart failure not clinically apparent, and recent TTE shows normal LV systolic function with preserved EF  - cont Symbicort BID (therapeutic interchange)

## 2017-12-22 NOTE — H&P ADULT - HISTORY OF PRESENT ILLNESS
77F with morbid obesity, HTN, HLD, AS s/p TAVR, asthma, GERD, bilateral knee OA s/p bilateral TKA (2002) presents for dyspnea and 77F with morbid obesity, HTN, HLD, AS s/p TAVR, asthma, GERD, bilateral knee OA s/p bilateral TKA (2002), polymyalgia on chronic prednisone presents for dyspnea and right knee pain. Patient reports chronic h/o dyspnea at baseline, especially with ambulation, unclear whether acutely worsening with last 2 days. She denies chest pain, leg swelling but admits to sleeping upright in recliner chair nightly. Patient follows with Cardiologist Dr. Roverto Roblero, recently seen in clinic for TTE on 12/19 which was relatively unremarkable. She does endorse rapid heart rate with chest pain 3 weeks ago after taking clindamycin prophylaxis for dental procedure (due to B/L TKR and TAVR). She endorses intolerance to amoxicillin in past due to "shakes" but denies throat swelling, wheezing, shortness of breath. Since then patient states it has been "all downhill from there." She endorses chills but no fever, night sweats, nausea, vomiting or abdominal pain. Yesterday, she  experienced acute onset of right knee pain, able to bear only minimal weight on right foot, difficulty with ambulation (ambulates without aides within home but with walker wheels elsewhere). She states right knee "feels more swollen" but does not exactly appear that way.     In ED, patient febrile to 101. Infectious work up performed. Given vancomycin x1, Zosyn x1, 0.9NS x 1L, morphine 4 IV, Toradol 15 IV and Tylenol 1g IV.

## 2017-12-22 NOTE — PROGRESS NOTE ADULT - ATTENDING COMMENTS
I agree with the above note and have personally seen and examined this patient. All pertinent films have been reviewed. Please refer to clinical documentation of the history, physical examinations, data summary, and both assessment and plan as documented above and with which I agree.    Nate Bass MD  Attending Orthopedic Surgeon

## 2017-12-22 NOTE — H&P ADULT - PROBLEM SELECTOR PLAN 4
- DVT prophylaxis with Lovenox 40 - cont ASA and simvastatin 20 - cont ASA 81, clopidogrel, simvastatin 20

## 2017-12-22 NOTE — DISCHARGE NOTE ADULT - CONDITIONS AT DISCHARGE
stable stable, Right upper arm singl lumen PICC line in place, inserted 12/28/2017 stable, Right upper arm single lumen PICC line in place, inserted 12/28/2017, right upper thigh device related pressure ulcer allevyn foam intact,  bilateral breast MAD, interdry for moisture management, lund d/cd 12/29 pt voiding well, tolerating diet stable, Right upper arm single lumen PICC line in place, inserted 12/28/2017, right upper thigh device related pressure ulcer allevyn foam intact,  bilateral breast and abdominal folds MAD, interdry for moisture management, lund d/cd 12/29 pt voiding well, tolerating diet.

## 2017-12-22 NOTE — H&P ADULT - PROBLEM SELECTOR PLAN 1
- associated with markedly elevated inflammatory markers (ESR 74, ), concerning for septic arthritis vs. endocarditis vs. other occult infection; osteomyelitis, temporal arteritis not clinically likely   - right knee evaluated by Orthopaedic Surgery; no indication for urgent intervention i.e. arthrocentesis at this time, will continue to monitor  - patient received antibiotic prophylaxis prior to dental procedures with recent unremarkable TTE so endocarditis less likely  - cont medical management with Zosyn q8, vancomycin q12  - f/u Orthopaedic Surgery recs  - monitor fever curve; Tylenol PRN  - f/u blood cultures - associated with markedly elevated inflammatory markers (ESR 74, ), concerning for septic arthritis vs. endocarditis vs. other occult infection; osteomyelitis, temporal arteritis not clinically likely   - right knee evaluated by Orthopaedic Surgery; no indication for urgent intervention i.e. arthrocentesis at this time, will continue to monitor  - patient received antibiotic prophylaxis prior to dental procedures with recent unremarkable TTE so endocarditis less likely  - cont medical management with Zosyn q8, vancomycin q12  - monitor fever curve; Tylenol PRN  - f/u Orthopaedic Surgery recs    - f/u blood cultures - associated with markedly elevated inflammatory markers (ESR 74, ), concerning for septic arthritis vs. endocarditis vs. other occult infection; osteomyelitis, temporal arteritis not clinically likely   - right knee evaluated by Orthopaedic Surgery; no indication for urgent intervention i.e. arthrocentesis at this time, will continue to monitor  - patient received antibiotic prophylaxis prior to dental procedures with recent unremarkable TTE so endocarditis less likely  - cont medical management with Zosyn q8, vancomycin q12  - monitor fever curve; Tylenol PRN  - US of right knee  - f/u Orthopaedic Surgery recs i.e. arthrocentesis pending US right knee   - f/u blood cultures

## 2017-12-22 NOTE — PHYSICAL THERAPY INITIAL EVALUATION ADULT - PERTINENT HX OF CURRENT PROBLEM, REHAB EVAL
Pt. is a 77 year old female admitted to [facility] secondary to fever. PMH: morbid obesity, HTN, HLD, B/L knee OA, Bilateral TKA.

## 2017-12-22 NOTE — H&P ADULT - NSHPREVIEWOFSYSTEMS_GEN_ALL_CORE
CONST: no fevers, no weight gain +lethargy +chills +decreased ambulation  EYES: no pain  ENT: no sore throat, no rhinorrhea  CV: no chest pain +palpitations +rapid heart rate  RESP: no cough, no wheeze +dyspnea on exertion  ABD: no abdominal pain, no nausea, no emesis   : no dysuria, no urinary frequency  MSK: +chronic left-sided LBP +right knee swelling and pain  NEURO: no headache or additional neurologic complaints  HEME: no easy bleeding  SKIN:  no rash

## 2017-12-22 NOTE — DISCHARGE NOTE ADULT - MEDICATION SUMMARY - MEDICATIONS TO STOP TAKING
I will STOP taking the medications listed below when I get home from the hospital:    traMADol 50 mg oral tablet  -- 1 tab(s) by mouth every 4 hours, As Needed

## 2017-12-22 NOTE — DISCHARGE NOTE ADULT - CARE PLAN
Principal Discharge DX:	Septic arthritis of knee, right  Goal:	Spacer placement in R knee, IV antibiotic treatment for bacterial infection  Instructions for follow-up, activity and diet:	You were admitted for an infection in your right knee. You had the first stage of total knee revision, and an antibiotic spacer placed in your knee. Because of the presence of the infection in your blood, you will need prolonged treatment with antibiotics, hence the placement of the PICC line. You will continue to receive treatment via this PICC line. Please continue to ensure that the site is clean and dry to minimize infection risk. Please follow up with infectious disease as well as your primary care physician. Principal Discharge DX:	Septic arthritis of knee, right  Goal:	Spacer placement in R knee, IV antibiotic treatment for bacterial infection  Instructions for follow-up, activity and diet:	You were admitted for an infection in your right knee. You had the first stage of total knee revision, and an antibiotic spacer placed in your knee. Because of the presence of the infection in your blood, you will need prolonged treatment with antibiotics, hence the placement of the PICC line. You will continue to receive treatment via this PICC line. Please continue to ensure that the site is clean and dry to minimize infection risk. Please follow up with infectious disease as well as your primary care physician.    You are being discharged to a rehabilitation facility, where you will continue to work on getting out of bed, walking, getting dressed, and other activities of daily living as you strengthen your knee and regain mobility and stability.

## 2017-12-22 NOTE — PROGRESS NOTE ADULT - SUBJECTIVE AND OBJECTIVE BOX
Orthopedics Preop Operative Note     Diagnosis: Septic right total knee replacement  Surgeon: Dr Bass  Procedure: I&D Right knee vs Stage I revision right total knee arthroplasty     Labs:   CBC Full  -  ( 21 Dec 2017 22:40 )  WBC Count : 8.76 K/uL  Hemoglobin : 10.2 g/dL  Hematocrit : 32.4 %  Platelet Count - Automated : 134 K/uL  Mean Cell Volume : 82.0 fL  Mean Cell Hemoglobin : 25.8 pg  Mean Cell Hemoglobin Concentration : 31.5 %  Auto Neutrophil # : 6.95 K/uL  Auto Lymphocyte # : 1.36 K/uL  Auto Monocyte # : 0.41 K/uL  Auto Eosinophil # : 0.00 K/uL  Auto Basophil # : 0.02 K/uL  Auto Neutrophil % : 79.4 %  Auto Lymphocyte % : 15.5 %  Auto Monocyte % : 4.7 %  Auto Eosinophil % : 0.0 %  Auto Basophil % : 0.2 %        140  |  103  |  21  ----------------------------<  99  5.2   |  23  |  1.19    Ca    8.7      21 Dec 2017 22:40    TPro  7.1  /  Alb  3.4  /  TBili  0.2  /  DBili  x   /  AST  30  /  ALT  16  /  AlkPhos  83  12-21    PT/INR - ( 22 Dec 2017 14:45 )   PT: 14.0 SEC;   INR: 1.21          PTT - ( 22 Dec 2017 14:45 )  PTT:38.3 SEC  Urinalysis Basic - ( 22 Dec 2017 01:41 )    Color: YELLOW / Appearance: CLEAR / S.024 / pH: 6.0  Gluc: NEGATIVE / Ketone: NEGATIVE  / Bili: NEGATIVE / Urobili: NORMAL mg/dL   Blood: SMALL / Protein: 100 mg/dL / Nitrite: NEGATIVE   Leuk Esterase: NEGATIVE / RBC: 10-25 / WBC 2-5   Sq Epi: OCC / Non Sq Epi: x / Bacteria: FEW              EKG: < from: 12 Lead ECG (17 @ 22:18) >  Diagnosis Line Sinus rhythm with Fusion complexes and Premature atrial complexes with Aberrant conduction  Left axis deviation    < end of copied text >    Chest Xray: < from: Xray Chest 1 View AP/PA (17 @ 00:52) >  Clear lungs.    < end of copied text >    Consent: Pending    Plan: 77y s/p infected right total knee replacement  1. NPO and IVF @ midnight  2. NWB RLE  3. Hold anticogulation  4. Ortho surgery on   5. Venodynes/Foot pumps  6. Incentive spiromety  7. Medical optimization-cards and medicine   8. Pain control Orthopedics Preop Operative Note     Diagnosis: Septic right total knee replacement  Surgeon: Dr Bass  Procedure: I&D Right knee vs Stage I revision right total knee arthroplasty     Labs:   CBC Full  -  ( 21 Dec 2017 22:40 )  WBC Count : 8.76 K/uL  Hemoglobin : 10.2 g/dL  Hematocrit : 32.4 %  Platelet Count - Automated : 134 K/uL  Mean Cell Volume : 82.0 fL  Mean Cell Hemoglobin : 25.8 pg  Mean Cell Hemoglobin Concentration : 31.5 %  Auto Neutrophil # : 6.95 K/uL  Auto Lymphocyte # : 1.36 K/uL  Auto Monocyte # : 0.41 K/uL  Auto Eosinophil # : 0.00 K/uL  Auto Basophil # : 0.02 K/uL  Auto Neutrophil % : 79.4 %  Auto Lymphocyte % : 15.5 %  Auto Monocyte % : 4.7 %  Auto Eosinophil % : 0.0 %  Auto Basophil % : 0.2 %        140  |  103  |  21  ----------------------------<  99  5.2   |  23  |  1.19    Ca    8.7      21 Dec 2017 22:40    TPro  7.1  /  Alb  3.4  /  TBili  0.2  /  DBili  x   /  AST  30  /  ALT  16  /  AlkPhos  83  12-21    PT/INR - ( 22 Dec 2017 14:45 )   PT: 14.0 SEC;   INR: 1.21          PTT - ( 22 Dec 2017 14:45 )  PTT:38.3 SEC  Urinalysis Basic - ( 22 Dec 2017 01:41 )    Color: YELLOW / Appearance: CLEAR / S.024 / pH: 6.0  Gluc: NEGATIVE / Ketone: NEGATIVE  / Bili: NEGATIVE / Urobili: NORMAL mg/dL   Blood: SMALL / Protein: 100 mg/dL / Nitrite: NEGATIVE   Leuk Esterase: NEGATIVE / RBC: 10-25 / WBC 2-5   Sq Epi: OCC / Non Sq Epi: x / Bacteria: FEW              EKG: < from: 12 Lead ECG (17 @ 22:18) >  Diagnosis Line Sinus rhythm with Fusion complexes and Premature atrial complexes with Aberrant conduction  Left axis deviation    < end of copied text >    Chest Xray: < from: Xray Chest 1 View AP/PA (17 @ 00:52) >  Clear lungs.    < end of copied text >    Consent: in chart    Plan: 77y s/p infected right total knee replacement for explant and spacer placement	  1. NPO and IVF @ midnight  2. NWB RLE  3. Hold anticogulation  4. Ortho surgery on   5. Venodynes/Foot pumps  6. Incentive spiromety  7. Medical optimization-cards and medicine   8. Pain control

## 2017-12-22 NOTE — PATIENT PROFILE ADULT. - VISION (WITH CORRECTIVE LENSES IF THE PATIENT USUALLY WEARS THEM):
Normal vision: sees adequately in most situations; can see medication labels, newsprint/Wears reading glasses

## 2017-12-22 NOTE — PROGRESS NOTE ADULT - PROBLEM SELECTOR PLAN 7
-Hold lovanox 40mg subq in anticipation of procedure tomorrow -Hold lovanox 40mg subq in anticipation of procedure tomorrow  -SCDs for prophylaxis

## 2017-12-22 NOTE — H&P ADULT - PROBLEM SELECTOR PLAN 3
- cont ASA and simvastatin 20 - possibly musculoskeletal vs. infectious etiology; see plan as noted above  - ambulate with assistance, fall risk protocol  - f/u Orthopaedic Surgery recs  - PT consult - possibly musculoskeletal vs. infectious etiology; see plan as noted above  - ambulate with assistance, fall risk protocol  - Percocet PRN for knee pain  - f/u Orthopaedic Surgery recs  - PT consult

## 2017-12-22 NOTE — CONSULT NOTE ADULT - SUBJECTIVE AND OBJECTIVE BOX
Cardiology/Vascular Medicine Inpatient Consultation Note    Date of Admission:        CHIEF COMPLAINT:        HISTORY OF PRESENT ILLNESS:  HPI:  77F with morbid obesity, HTN, HLD, AS s/p TAVR, asthma, GERD, bilateral knee OA s/p bilateral TKA (2002), polymyalgia on chronic prednisone presents for dyspnea and right knee pain. Patient reports chronic h/o dyspnea at baseline, especially with ambulation, unclear whether acutely worsening with last 2 days. She denies chest pain, leg swelling but admits to sleeping upright in recliner chair nightly. Patient follows with Cardiologist Dr. Roverto Roblero, recently seen in clinic for TTE on 12/19 which was relatively unremarkable. She does endorse rapid heart rate with chest pain 3 weeks ago after taking clindamycin prophylaxis for dental procedure (due to B/L TKR and TAVR). She endorses intolerance to amoxicillin in past due to "shakes" but denies throat swelling, wheezing, shortness of breath. Since then patient states it has been "all downhill from there." She endorses chills but no fever, night sweats, nausea, vomiting or abdominal pain. Yesterday, she  experienced acute onset of right knee pain, able to bear only minimal weight on right foot, difficulty with ambulation (ambulates without aides within home but with walker wheels elsewhere). She states right knee "feels more swollen" but does not exactly appear that way.     In ED, patient febrile to 101. Infectious work up performed. Given vancomycin x1, Zosyn x1, 0.9NS x 1L, morphine 4 IV, Toradol 15 IV and Tylenol 1g IV. (22 Dec 2017 05:50)          Allergies    No Known Allergies    Intolerances    IV Contrast (Flushing (Skin); Nausea)  	    MEDICATIONS:  clopidogrel Tablet 75 milliGRAM(s) Oral daily  enoxaparin Injectable 40 milliGRAM(s) SubCutaneous every 24 hours  lisinopril 10 milliGRAM(s) Oral daily    piperacillin/tazobactam IVPB. 3.375 Gram(s) IV Intermittent every 8 hours  vancomycin  IVPB 1250 milliGRAM(s) IV Intermittent every 12 hours    buDESOnide 160 MICROgram(s)/formoterol 4.5 MICROgram(s) Inhaler 2 Puff(s) Inhalation two times a day    acetaminophen   Tablet 650 milliGRAM(s) Oral every 6 hours PRN  melatonin 3 milliGRAM(s) Oral at bedtime PRN  oxyCODONE    5 mG/acetaminophen 325 mG 1 Tablet(s) Oral every 6 hours PRN    docusate sodium 100 milliGRAM(s) Oral two times a day  pantoprazole    Tablet 40 milliGRAM(s) Oral before breakfast    simvastatin 20 milliGRAM(s) Oral at bedtime    nystatin Powder 1 Application(s) Topical two times a day PRN      PAST MEDICAL & SURGICAL HISTORY:  CAD (coronary artery disease): HERBERT to LAD 11/2016  Aortic stenosis, severe  Uncomplicated asthma, unspecified asthma severity  Polymyalgia  Lumbar disc disease with radiculopathy  Spider veins  Anxiety  Severe aortic stenosis by prior echocardiogram: 2013 and  11/2016  Dysphagia  Morbid obesity with BMI of 50.0-59.9, adult  Macular degeneration  Hiatal hernia  GERD (gastroesophageal reflux disease)  Sciatica  Osteoarthritis  H/O cardiac catheterization: 11/29/16 HERBERT placed on LAD  H/O endoscopy: with dilatation of esophageal stricture 2013  S/P cataract surgery: x2; 3-4yr ago  S/P gastroplasty: 28 yrs ago  S/P cholecystectomy: more than 15 years ago  S/P total knee replacement: left, 15yr ago  S/P total knee replacement: right, 12yr ago  S/P hysterectomy: 24yr ago      FAMILY HISTORY:  No pertinent family history in first degree relatives      SOCIAL HISTORY:    [ ] Non-smoker  [ ] Smoker  [ ] Alcohol    REVIEW OF SYSTEMS:  CONSTITUTIONAL: No fever, weight loss, or fatigue  EYES: No eye pain, visual disturbances, or discharge  ENMT:  No difficulty hearing, tinnitus, vertigo; No sinus or throat pain  NECK: No pain or stiffness  RESPIRATORY: No cough, wheezing, chills or hemoptysis; No Shortness of Breath  CARDIOVASCULAR: No chest pain, palpitations, passing out, dizziness, or leg swelling  GASTROINTESTINAL: No abdominal or epigastric pain. No nausea, vomiting, or hematemesis; No diarrhea or constipation. No melena or hematochezia.  GENITOURINARY: No dysuria, frequency, hematuria, or incontinence  NEUROLOGICAL: No headaches, memory loss, loss of strength, numbness, or tremors  SKIN: No itching, burning, rashes, or lesions   LYMPH Nodes: No enlarged glands  ENDOCRINE: No heat or cold intolerance; No hair loss  MUSCULOSKELETAL: No joint pain or swelling; No muscle, back, or extremity pain  PSYCHIATRIC: No depression, anxiety, mood swings, or difficulty sleeping  HEME/LYMPH: No easy bruising, or bleeding gums  ALLERY AND IMMUNOLOGIC: No hives or eczema	    [ ] All others negative	  [ ] Unable to obtain    PHYSICAL EXAM:  T(C): 36.9 (12-22-17 @ 13:21), Max: 38.4 (12-21-17 @ 22:26)  HR: 75 (12-22-17 @ 13:21) (71 - 90)  BP: 141/68 (12-22-17 @ 13:21) (112/50 - 141/68)  RR: 18 (12-22-17 @ 13:21) (13 - 19)  SpO2: 99% (12-22-17 @ 13:21) (96% - 100%)  Wt(kg): --  I&O's Summary      Appearance: Normal	  HEENT:   Normal oral mucosa, PERRL, EOMI	  Lymphatic: No lymphadenopathy  Cardiovascular: Normal S1 S2, No JVD, No murmurs, No edema  Respiratory: Lungs clear to auscultation	  Psychiatry: A & O x 3, Mood & affect appropriate  Gastrointestinal:  Soft, Non-tender, + BS	  Skin: No rashes, No ecchymoses, No cyanosis	  Neurologic: Non-focal  Extremities: Normal range of motion, No clubbing, cyanosis or edema  Vascular: Peripheral pulses palpable 2+ bilaterally      LABS:	 	    CBC Full  -  ( 21 Dec 2017 22:40 )  WBC Count : 8.76 K/uL  Hemoglobin : 10.2 g/dL  Hematocrit : 32.4 %  Platelet Count - Automated : 134 K/uL  Mean Cell Volume : 82.0 fL  Mean Cell Hemoglobin : 25.8 pg  Mean Cell Hemoglobin Concentration : 31.5 %  Auto Neutrophil # : 6.95 K/uL  Auto Lymphocyte # : 1.36 K/uL  Auto Monocyte # : 0.41 K/uL  Auto Eosinophil # : 0.00 K/uL  Auto Basophil # : 0.02 K/uL  Auto Neutrophil % : 79.4 %  Auto Lymphocyte % : 15.5 %  Auto Monocyte % : 4.7 %  Auto Eosinophil % : 0.0 %  Auto Basophil % : 0.2 %    12-21    140  |  103  |  21  ----------------------------<  99  5.2   |  23  |  1.19    Ca    8.7      21 Dec 2017 22:40    TPro  7.1  /  Alb  3.4  /  TBili  0.2  /  DBili  x   /  AST  30  /  ALT  16  /  AlkPhos  83  12-21      proBNP:   Lipid Profile:   HgA1c:   TSH:       CARDIAC MARKERS:            TELEMETRY: 	    ECG:   	  RADIOLOGY:  OTHER: 	      PREVIOUS DIAGNOSTIC CARDIOVASCULAR TESTING:      [ ]  Echocardiogram:  [ ]  Catheterization:  [ ]  Stress test:    [ ]  Vascular studies: Cardiology/Vascular Medicine Inpatient Consultation Note    Patient see/examined  Plan for possible debridement knee in OR  Clinically stable from cardiovascular perspective  May proceed with OR from our perspective  Continue IV abx  Full note to follow.      HISTORY OF PRESENT ILLNESS:  HPI:  77F with morbid obesity, HTN, HLD, AS s/p TAVR, asthma, GERD, bilateral knee OA s/p bilateral TKA (2002), polymyalgia on chronic prednisone presents for dyspnea and right knee pain. Patient reports chronic h/o dyspnea at baseline, especially with ambulation, unclear whether acutely worsening with last 2 days. She denies chest pain, leg swelling but admits to sleeping upright in recliner chair nightly. Patient follows with Cardiologist Dr. Roverto Roblero, recently seen in clinic for TTE on 12/19 which was relatively unremarkable. She does endorse rapid heart rate with chest pain 3 weeks ago after taking clindamycin prophylaxis for dental procedure (due to B/L TKR and TAVR). She endorses intolerance to amoxicillin in past due to "shakes" but denies throat swelling, wheezing, shortness of breath. Since then patient states it has been "all downhill from there." She endorses chills but no fever, night sweats, nausea, vomiting or abdominal pain. Yesterday, she  experienced acute onset of right knee pain, able to bear only minimal weight on right foot, difficulty with ambulation (ambulates without aides within home but with walker wheels elsewhere). She states right knee "feels more swollen" but does not exactly appear that way.     In ED, patient febrile to 101. Infectious work up performed. Given vancomycin x1, Zosyn x1, 0.9NS x 1L, morphine 4 IV, Toradol 15 IV and Tylenol 1g IV. (22 Dec 2017 05:50)          Allergies    No Known Allergies    Intolerances    IV Contrast (Flushing (Skin); Nausea)  	    MEDICATIONS:  clopidogrel Tablet 75 milliGRAM(s) Oral daily  enoxaparin Injectable 40 milliGRAM(s) SubCutaneous every 24 hours  lisinopril 10 milliGRAM(s) Oral daily    piperacillin/tazobactam IVPB. 3.375 Gram(s) IV Intermittent every 8 hours  vancomycin  IVPB 1250 milliGRAM(s) IV Intermittent every 12 hours    buDESOnide 160 MICROgram(s)/formoterol 4.5 MICROgram(s) Inhaler 2 Puff(s) Inhalation two times a day    acetaminophen   Tablet 650 milliGRAM(s) Oral every 6 hours PRN  melatonin 3 milliGRAM(s) Oral at bedtime PRN  oxyCODONE    5 mG/acetaminophen 325 mG 1 Tablet(s) Oral every 6 hours PRN    docusate sodium 100 milliGRAM(s) Oral two times a day  pantoprazole    Tablet 40 milliGRAM(s) Oral before breakfast    simvastatin 20 milliGRAM(s) Oral at bedtime    nystatin Powder 1 Application(s) Topical two times a day PRN      PAST MEDICAL & SURGICAL HISTORY:  CAD (coronary artery disease): HERBERT to LAD 11/2016  Aortic stenosis, severe  Uncomplicated asthma, unspecified asthma severity  Polymyalgia  Lumbar disc disease with radiculopathy  Spider veins  Anxiety  Severe aortic stenosis by prior echocardiogram: 2013 and  11/2016  Dysphagia  Morbid obesity with BMI of 50.0-59.9, adult  Macular degeneration  Hiatal hernia  GERD (gastroesophageal reflux disease)  Sciatica  Osteoarthritis  H/O cardiac catheterization: 11/29/16 HERBERT placed on LAD  H/O endoscopy: with dilatation of esophageal stricture 2013  S/P cataract surgery: x2; 3-4yr ago  S/P gastroplasty: 28 yrs ago  S/P cholecystectomy: more than 15 years ago  S/P total knee replacement: left, 15yr ago  S/P total knee replacement: right, 12yr ago  S/P hysterectomy: 24yr ago      FAMILY HISTORY:  No pertinent family history in first degree relatives      SOCIAL HISTORY:    [ ] Non-smoker  [ ] Smoker  [ ] Alcohol    REVIEW OF SYSTEMS:  CONSTITUTIONAL: No fever, weight loss, or fatigue  EYES: No eye pain, visual disturbances, or discharge  ENMT:  No difficulty hearing, tinnitus, vertigo; No sinus or throat pain  NECK: No pain or stiffness  RESPIRATORY: No cough, wheezing, chills or hemoptysis; No Shortness of Breath  CARDIOVASCULAR: No chest pain, palpitations, passing out, dizziness, or leg swelling  GASTROINTESTINAL: No abdominal or epigastric pain. No nausea, vomiting, or hematemesis; No diarrhea or constipation. No melena or hematochezia.  GENITOURINARY: No dysuria, frequency, hematuria, or incontinence  NEUROLOGICAL: No headaches, memory loss, loss of strength, numbness, or tremors  SKIN: No itching, burning, rashes, or lesions   LYMPH Nodes: No enlarged glands  ENDOCRINE: No heat or cold intolerance; No hair loss  MUSCULOSKELETAL: No joint pain or swelling; No muscle, back, or extremity pain  PSYCHIATRIC: No depression, anxiety, mood swings, or difficulty sleeping  HEME/LYMPH: No easy bruising, or bleeding gums  ALLERY AND IMMUNOLOGIC: No hives or eczema	    [ ] All others negative	  [ ] Unable to obtain    PHYSICAL EXAM:  T(C): 36.9 (12-22-17 @ 13:21), Max: 38.4 (12-21-17 @ 22:26)  HR: 75 (12-22-17 @ 13:21) (71 - 90)  BP: 141/68 (12-22-17 @ 13:21) (112/50 - 141/68)  RR: 18 (12-22-17 @ 13:21) (13 - 19)  SpO2: 99% (12-22-17 @ 13:21) (96% - 100%)  Wt(kg): --  I&O's Summary      Appearance: Normal	  HEENT:   Normal oral mucosa, PERRL, EOMI	  Lymphatic: No lymphadenopathy  Cardiovascular: Normal S1 S2, No JVD, No murmurs, No edema  Respiratory: Lungs clear to auscultation	  Psychiatry: A & O x 3, Mood & affect appropriate  Gastrointestinal:  Soft, Non-tender, + BS	  Skin: No rashes, No ecchymoses, No cyanosis	  Neurologic: Non-focal  Extremities: Normal range of motion, No clubbing, cyanosis or edema  Vascular: Peripheral pulses palpable 2+ bilaterally      LABS:	 	    CBC Full  -  ( 21 Dec 2017 22:40 )  WBC Count : 8.76 K/uL  Hemoglobin : 10.2 g/dL  Hematocrit : 32.4 %  Platelet Count - Automated : 134 K/uL  Mean Cell Volume : 82.0 fL  Mean Cell Hemoglobin : 25.8 pg  Mean Cell Hemoglobin Concentration : 31.5 %  Auto Neutrophil # : 6.95 K/uL  Auto Lymphocyte # : 1.36 K/uL  Auto Monocyte # : 0.41 K/uL  Auto Eosinophil # : 0.00 K/uL  Auto Basophil # : 0.02 K/uL  Auto Neutrophil % : 79.4 %  Auto Lymphocyte % : 15.5 %  Auto Monocyte % : 4.7 %  Auto Eosinophil % : 0.0 %  Auto Basophil % : 0.2 %    12-21    140  |  103  |  21  ----------------------------<  99  5.2   |  23  |  1.19    Ca    8.7      21 Dec 2017 22:40    TPro  7.1  /  Alb  3.4  /  TBili  0.2  /  DBili  x   /  AST  30  /  ALT  16  /  AlkPhos  83  12-21      proBNP:   Lipid Profile:   HgA1c:   TSH:       CARDIAC MARKERS:            TELEMETRY: 	    ECG:   	  RADIOLOGY:  OTHER: 	      PREVIOUS DIAGNOSTIC CARDIOVASCULAR TESTING:      [ ]  Echocardiogram:  [ ]  Catheterization:  [ ]  Stress test:    [ ]  Vascular studies: Cardiology/Vascular Medicine Inpatient Consultation Note    Patient seen/examined  Plan for possible debridement knee in OR  Clinically stable from cardiovascular perspective  May proceed with OR from our perspective  Continue IV abx      History of AS s/p TAVR  Normal LV function  Mild pulmonary HTN  Probable diastolic dysfunction  Morbid obesity, HTN, hyperlipidemia  Stable from the cardiovascular perspective.  From our standpoint, she may proceed with orthopedic procedure to be performed under general anesthesia.  Discussed with Orthopedics team -- can hold antiplatelets (TAVR one year ago) for procedure, can resume immediately with aspirin (as preferred by the Ortho team), with clopidogrel to be added back on as early as feasible.      HISTORY OF PRESENT ILLNESS:  HPI:  77F with morbid obesity, HTN, HLD, AS s/p TAVR, asthma, GERD, bilateral knee OA s/p bilateral TKA (2002), polymyalgia on chronic prednisone presents for dyspnea and right knee pain. Patient reports chronic h/o dyspnea at baseline, especially with ambulation, unclear whether acutely worsening with last 2 days. She denies chest pain, leg swelling but admits to sleeping upright in recliner chair nightly. Patient follows with Cardiologist Dr. Roverto Roblero, recently seen in clinic for TTE on 12/19 which was relatively unremarkable. She does endorse rapid heart rate with chest pain 3 weeks ago after taking clindamycin prophylaxis for dental procedure (due to B/L TKR and TAVR). She endorses intolerance to amoxicillin in past due to "shakes" but denies throat swelling, wheezing, shortness of breath. Since then patient states it has been "all downhill from there." She endorses chills but no fever, night sweats, nausea, vomiting or abdominal pain. Yesterday, she  experienced acute onset of right knee pain, able to bear only minimal weight on right foot, difficulty with ambulation (ambulates without aides within home but with walker wheels elsewhere). She states right knee "feels more swollen" but does not exactly appear that way.     In ED, patient febrile to 101. Infectious work up performed. Given vancomycin x1, Zosyn x1, 0.9NS x 1L, morphine 4 IV, Toradol 15 IV and Tylenol 1g IV. (22 Dec 2017 05:50)    Clinically stable from the cardiac perspective.  No complaints of chest pain, SOB, palpitations.  Volume status stable.      Recent TTE demonstrates normal LVEF, TAVR noted without issues, mild pulmonary HTN.      Allergies  No Known Allergies    Intolerances  IV Contrast (Flushing (Skin); Nausea)  	    MEDICATIONS:  clopidogrel Tablet 75 milliGRAM(s) Oral daily  enoxaparin Injectable 40 milliGRAM(s) SubCutaneous every 24 hours  lisinopril 10 milliGRAM(s) Oral daily    piperacillin/tazobactam IVPB. 3.375 Gram(s) IV Intermittent every 8 hours  vancomycin  IVPB 1250 milliGRAM(s) IV Intermittent every 12 hours    buDESOnide 160 MICROgram(s)/formoterol 4.5 MICROgram(s) Inhaler 2 Puff(s) Inhalation two times a day    acetaminophen   Tablet 650 milliGRAM(s) Oral every 6 hours PRN  melatonin 3 milliGRAM(s) Oral at bedtime PRN  oxyCODONE    5 mG/acetaminophen 325 mG 1 Tablet(s) Oral every 6 hours PRN    docusate sodium 100 milliGRAM(s) Oral two times a day  pantoprazole    Tablet 40 milliGRAM(s) Oral before breakfast    simvastatin 20 milliGRAM(s) Oral at bedtime    nystatin Powder 1 Application(s) Topical two times a day PRN      PAST MEDICAL & SURGICAL HISTORY:  CAD (coronary artery disease): HERBERT to LAD 11/2016  Aortic stenosis, severe  Uncomplicated asthma, unspecified asthma severity  Polymyalgia  Lumbar disc disease with radiculopathy  Spider veins  Anxiety  Severe aortic stenosis by prior echocardiogram: 2013 and  11/2016  Dysphagia  Morbid obesity with BMI of 50.0-59.9, adult  Macular degeneration  Hiatal hernia  GERD (gastroesophageal reflux disease)  Sciatica  Osteoarthritis  H/O cardiac catheterization: 11/29/16 HERBERT placed on LAD  H/O endoscopy: with dilatation of esophageal stricture 2013  S/P cataract surgery: x2; 3-4yr ago  S/P gastroplasty: 28 yrs ago  S/P cholecystectomy: more than 15 years ago  S/P total knee replacement: left, 15yr ago  S/P total knee replacement: right, 12yr ago  S/P hysterectomy: 24yr ago      FAMILY HISTORY:  No pertinent family history in first degree relatives    REVIEW OF SYSTEMS:  CONSTITUTIONAL: No fever, weight loss, or fatigue  EYES: No eye pain, visual disturbances, or discharge  ENMT:  No difficulty hearing, tinnitus, vertigo; No sinus or throat pain  NECK: No pain or stiffness  RESPIRATORY: As above  CARDIOVASCULAR: As above  GASTROINTESTINAL: No abdominal or epigastric pain. No nausea, vomiting, or hematemesis; No diarrhea or constipation. No melena or hematochezia.  GENITOURINARY: No dysuria, frequency, hematuria, or incontinence  NEUROLOGICAL: As above  SKIN: No itching, burning, rashes, or lesions   LYMPH Nodes: No enlarged glands  ENDOCRINE: No heat or cold intolerance; No hair loss  MUSCULOSKELETAL: As above  PSYCHIATRIC: No depression, anxiety, mood swings, or difficulty sleeping  HEME/LYMPH: No easy bruising, or bleeding gums  ALLERGY AND IMMUNOLOGIC: No hives or eczema	    PHYSICAL EXAM:  T(C): 36.9 (12-22-17 @ 13:21), Max: 38.4 (12-21-17 @ 22:26)  HR: 75 (12-22-17 @ 13:21) (71 - 90)  BP: 141/68 (12-22-17 @ 13:21) (112/50 - 141/68)  RR: 18 (12-22-17 @ 13:21) (13 - 19)  SpO2: 99% (12-22-17 @ 13:21) (96% - 100%)      Appearance: NAD, laying flat in bed  HEENT:   Normal oral mucosa, PERRL, EOMI	  Lymphatic: No lymphadenopathy  Cardiovascular: Normal S1 S2, No JVD, No murmurs, No edema  Respiratory: Lungs clear to auscultation	  Psychiatry: A & O x 3, Mood & affect appropriate  Gastrointestinal:  Soft, Non-tender, + BS	  Skin: No rashes, No ecchymoses, No cyanosis	  Neurologic: Non-focal  Extremities: Normal range of motion, No clubbing, cyanosis or edema  Vascular: Peripheral pulses palpable 2+ bilaterally      LABS:	 	    CBC Full  -  ( 21 Dec 2017 22:40 )  WBC Count : 8.76 K/uL  Hemoglobin : 10.2 g/dL  Hematocrit : 32.4 %  Platelet Count - Automated : 134 K/uL  Mean Cell Volume : 82.0 fL  Mean Cell Hemoglobin : 25.8 pg  Mean Cell Hemoglobin Concentration : 31.5 %  Auto Neutrophil # : 6.95 K/uL  Auto Lymphocyte # : 1.36 K/uL  Auto Monocyte # : 0.41 K/uL  Auto Eosinophil # : 0.00 K/uL  Auto Basophil # : 0.02 K/uL  Auto Neutrophil % : 79.4 %  Auto Lymphocyte % : 15.5 %  Auto Monocyte % : 4.7 %  Auto Eosinophil % : 0.0 %  Auto Basophil % : 0.2 %    12-21    140  |  103  |  21  ----------------------------<  99  5.2   |  23  |  1.19    Ca    8.7      21 Dec 2017 22:40    TPro  7.1  /  Alb  3.4  /  TBili  0.2  /  DBili  x   /  AST  30  /  ALT  16  /  AlkPhos  83  12-21    < from: Transthoracic Echocardiogram (12.19.17 @ 07:36) >    Patient name: IRASEMA KOHLER  YOB: 1940   Age: 77 (F)   MR#: [id number]  Study Date: 12/19/2017  Location: O/PSonographer: Lauren Finkelstein  Study quality: Technically good  Referring Physician: Roverto Roblero MD  Blood Pressure: 152/63 mmHg  Height: 160 cm  Weight: 120 kg  BSA: 2.2 m2  ------------------------------------------------------------------------  PROCEDURE: Transthoracic echocardiogram with 2-D, M-Mode  and complete spectral and color flow Doppler.  INDICATION: Atherosclerotic heart disease of native  coronary artery without angina pectoris (I25.10),  Unspecified diastolic (congestive) heart failure (I50.30)  ------------------------------------------------------------------------  DIMENSIONS:  Dimensions:     Normal Values:  LA:     3.5 cm    2.0 - 4.0 cm  Ao:     2.2 cm    2.0 - 3.8 cm  SEPTUM: 0.9 cm    0.6 - 1.2 cm  PWT:    0.8 cm    0.6 - 1.1 cm  LVIDd:  4.6 cm    3.0 - 5.6 cm  LVIDs:  2.6 cm    1.8 - 4.0 cm  Derived Variables:  LVMI: 59 g/m2  RWT: 0.34  Fractional short: 43 %  Ejection Fraction (Teicholtz): 75 %  ------------------------------------------------------------------------  OBSERVATIONS:  Mitral Valve: Mitral annular calcification and calcified  mitral leaflets with normal diastolic opening. Mild mitral  regurgitation.  Mean transmitral valve gradient equals 5 mm  Hg, consistent with moderate mitral stenosis.  Aortic Root: Normal aortic root.  Aortic Valve: TAVR noted. Peak transaortic valve gradient  equals 27 mm Hg, mean transaorticvalve gradient equals 13  mm Hg, which is probably normal in the presence of a  prosthetic valve. Minimal aortic regurgitation.  Left Atrium: Normal left atrium.  LA volume index = 31  cc/m2.  Left Ventricle: Normal left ventricular systolic function.  No segmental wall motion abnormalities. Normal left  ventricular internal dimensions and wall thicknesses.  Right Heart: Normal right atrium. Normal right ventricular  size and function. Normal tricuspid valve.  Mild tricuspid  regurgitation. Normal pulmonic valve. Minimal pulmonic  regurgitation.  Pericardium/PleuraNormal pericardium with no pericardial  effusion.  Hemodynamic: Estimated right ventricular systolic pressure  equals 41 mm Hg, assuming right atrial pressure equals 10  mm Hg, consistent with mild pulmonary hypertension.  ------------------------------------------------------------------------  CONCLUSIONS:  1. TAVR noted. Peak transaortic valve gradient equals 27 mm  Hg, mean transaortic valve gradient equals 13 mm Hg, which  is probably normal in the presence of a prosthetic valve.  Minimal aortic regurgitation.  2. Normal left ventricular internal dimensions and wall  thicknesses.  3. Normal left ventricular systolic function. No segmental  wall motion abnormalities.  4.Normal right ventricular size and function.  5. Normal tricuspid valve.  Mild tricuspid regurgitation.  6. Estimated pulmonary artery systolic pressure equals 41  mm Hg, assuming right atrial pressure equals 10  mm Hg,  consistent with mild pulmonaryhypertension.  ------------------------------------------------------------------------  Confirmed on  12/20/2017 - 07:16:23 by Roverto Roblero MD,  FACCASSANDRA, SOPHIE, SUMAN  ------------------------------------------------------------------------    < end of copied text >

## 2017-12-22 NOTE — H&P ADULT - ASSESSMENT
77F with morbid obesity, AS s/p TAVR, bilateral knee OA s/p TKA (2002), polymyalgia on chronic prednisone with multiple other comorbidities presents for dyspnea and right knee pain, appears in no acute respiratory distress, however fever and elevated inflammatory markers suspicious for right knee septic arthritis vs. endocarditis given other infectious sources ruled out. 77F with morbid obesity, AS s/p TAVR, bilateral knee OA s/p TKA (2002), polymyalgia on chronic prednisone with multiple other comorbidities presents for dyspnea and right knee pain, appears in no acute respiratory distress, however fever and elevated inflammatory markers suspicious for right knee septic arthritis vs. endocarditis vs. occult infection.

## 2017-12-22 NOTE — PHYSICAL THERAPY INITIAL EVALUATION ADULT - MANUAL MUSCLE TESTING RESULTS, REHAB EVAL
Bilateral UE muscle strength grossly 3/5 throughout, Bilateral LE muscle strength grossly 3-/5 throughout

## 2017-12-22 NOTE — DISCHARGE NOTE ADULT - MEDICATION SUMMARY - MEDICATIONS TO TAKE
I will START or STAY ON the medications listed below when I get home from the hospital:    predniSONE 2.5 mg oral tablet  -- 1 tab(s) by mouth once a day  -- Indication: For Polymyalgia    oxyCODONE-acetaminophen 5 mg-325 mg oral tablet  -- 1 tab(s) by mouth every 4 hours, As needed, Moderate Pain (4 - 6)  -- Indication: For Right knee pain    aspirin 81 mg oral delayed release tablet  -- 1 tab(s) by mouth once a day  -- Indication: For Prophylactic measure    lisinopril 10 mg oral tablet  -- 1 tab(s) by mouth once a day  -- Indication: For Hypertension    simvastatin 20 mg oral tablet  -- 1 tab(s) by mouth once a day (at bedtime)  -- Indication: For Hyperlipidemia    clopidogrel 75 mg oral tablet  -- 1 tab(s) by mouth once a day  -- Indication: For Prophylactic measure    Advair Diskus 250 mcg-50 mcg inhalation powder  -- 1 puff(s) inhaled 2 times a day  -- Indication: For Asthma    cefTRIAXone  -- 2 gram(s) intravenously once a day  -- Indication: For Septic arthritis of knee, right    docusate sodium 100 mg oral capsule  -- 1 cap(s) by mouth 3 times a day  -- Indication: For Constipation    melatonin 3 mg oral tablet  -- 1 tab(s) by mouth once a day (at bedtime), As needed, Insomnia  -- Indication: For Insomnia    pantoprazole 40 mg oral delayed release tablet  -- 1 tab(s) by mouth once a day (before a meal)  -- Indication: For GERD (gastroesophageal reflux disease)

## 2017-12-22 NOTE — DISCHARGE NOTE ADULT - ADDITIONAL INSTRUCTIONS
Please follow-up with Dr. Bass in 2-3 weeks after discharge.   Your [place] knee brace setting should be kept at 0-90 degrees at all times until adjusted by your orthopedic surgeon.   Activity: toe-touch weight bearing on right lower extremity Please follow-up with Dr. Bass in 2-3 weeks after discharge.   Your [place] knee brace setting should be kept at 0-90 degrees at all times until adjusted by your orthopedic surgeon.   Activity: toe-touch weight bearing on right lower extremity    Please also follow up with infectious disease as well as your primary care physician. You will require weekly blood tests after your discharge,

## 2017-12-22 NOTE — PHYSICAL THERAPY INITIAL EVALUATION ADULT - CRITERIA FOR SKILLED THERAPEUTIC INTERVENTIONS
anticipated discharge recommendation/impairments found/rehab potential/anticipated D/C to rehab facility to address current functional limitation to optimize safety to allow pt. to return to their prior level of function./functional limitations in following categories

## 2017-12-22 NOTE — PHYSICAL THERAPY INITIAL EVALUATION ADULT - DISCHARGE DISPOSITION, PT EVAL
anticipated D/C to rehab facility to address current functional limitation to optimize safety to allow pt. to return to their prior level of function./rehabilitation facility

## 2017-12-22 NOTE — DISCHARGE NOTE ADULT - INSTRUCTIONS
Maintain Knee incision and dressing clean dry and intact with navarro brace at all times. Change device related pressure injury allevyn on right upper medial thigh every three days. R PICC line in place, change dressing every 7 days, next dressing change 1/5/18. Continue to apply interdry for severe MAD in abdominal folds and b/l breasts. Call MD with any signs of infection ie fever, redness, drainage, or with signs of bleeding, unrelieved increased pain, or persistent nausea. Continue to drink plenty of fluids. Avoid strenuous activity and constipation which may be a side effect from taking certain medications and narcotics. Safety and fall prevention measures reinforced.

## 2017-12-22 NOTE — PROGRESS NOTE ADULT - ATTENDING COMMENTS
77 y.o.Female w/ hx Obesity, HTN, AS s/p TAVR, GERD,  OA, B/L TKA 15 years ago p/w fever , and R knee pain unable to bare weight.  Ortho performed an arthocentesis and believe she has a septic joint with infected hardware so will to go to OR for washout and possible spacer. Patient at moderate risk for moderate risk procedure. Since patient has active SOB would recommend cardiology clearance.

## 2017-12-22 NOTE — H&P ADULT - NSHPLABSRESULTS_GEN_ALL_CORE
Laboratory studies, imaging and EKG tracing personally reviewed:    CBC shows mild anemia to 10.2, normal WBC however neutrophilic predominance at 79.4%. BMP unremarkable. Urinalysis negative.     CXR shows clear lung fields, mild right pleural effusion. Laboratory studies, imaging and EKG tracing personally reviewed:    CBC shows mild anemia to 10.2, normal WBC however neutrophilic predominance at 79.4%. BMP unremarkable. Urinalysis negative.     CXR reviewed personally: shows clear lung fields, mild right pleural effusion.

## 2017-12-22 NOTE — DISCHARGE NOTE ADULT - PLAN OF CARE
Spacer placement in R knee, IV antibiotic treatment for bacterial infection You were admitted for an infection in your right knee. You had the first stage of total knee revision, and an antibiotic spacer placed in your knee. Because of the presence of the infection in your blood, you will need prolonged treatment with antibiotics, hence the placement of the PICC line. You will continue to receive treatment via this PICC line. Please continue to ensure that the site is clean and dry to minimize infection risk. Please follow up with infectious disease as well as your primary care physician. You were admitted for an infection in your right knee. You had the first stage of total knee revision, and an antibiotic spacer placed in your knee. Because of the presence of the infection in your blood, you will need prolonged treatment with antibiotics, hence the placement of the PICC line. You will continue to receive treatment via this PICC line. Please continue to ensure that the site is clean and dry to minimize infection risk. Please follow up with infectious disease as well as your primary care physician.    You are being discharged to a rehabilitation facility, where you will continue to work on getting out of bed, walking, getting dressed, and other activities of daily living as you strengthen your knee and regain mobility and stability.

## 2017-12-22 NOTE — H&P ADULT - NSHPPHYSICALEXAM_GEN_ALL_CORE
Vital Signs Last 24 Hrs  T(C): 36.8 (22 Dec 2017 02:47), Max: 38.4 (21 Dec 2017 22:26)  T(F): 98.3 (22 Dec 2017 02:47), Max: 101.1 (21 Dec 2017 22:26)  HR: 73 (22 Dec 2017 02:47) (73 - 90)  BP: 112/50 (22 Dec 2017 02:47) (112/50 - 136/71)  BP(mean): --  RR: 13 (22 Dec 2017 02:47) (13 - 18)  SpO2: 99% (22 Dec 2017 02:47) (99% - 100%)    GENERAL: AOx3, morbidly obese, pleasant elderly female, no acute distress  HEENT: pupils equal & reactive, no scleral icterus  NECK: supple, no JVD appreciated  CARDIAC: S1 & S2 auscultated +regular rhythm with premature beats  CHEST: breath sounds clear & equal B/L, no increased WOB, no wheezes/crackles  ABDOMEN: Obese abdomen, bowel sounds present, soft, NTTP  NEURO: CN II-XII grossly intact, no focal deficits  MSKl: R. knee - extension/flexion limited 2/2 pain, no increased warmth, no overt edema, no superficial erythema, NTTP  SKIN: Warm and dry, no diffuse rashes, no wounds  PSYCH: Speech fluid, appropriate mood and affect  EXT: No LE edema, 2+ pedal pulses B/L Vital Signs Last 24 Hrs  T(C): 36.8 (22 Dec 2017 02:47), Max: 38.4 (21 Dec 2017 22:26)  T(F): 98.3 (22 Dec 2017 02:47), Max: 101.1 (21 Dec 2017 22:26)  HR: 73 (22 Dec 2017 02:47) (73 - 90)  BP: 112/50 (22 Dec 2017 02:47) (112/50 - 136/71)  BP(mean): --  RR: 13 (22 Dec 2017 02:47) (13 - 18)  SpO2: 99% (22 Dec 2017 02:47) (99% - 100%)    GENERAL: AOx3, morbidly obese, pleasant elderly female, no acute distress, non-septic appearing  HEENT: pupils equal & reactive, no scleral icterus  NECK: supple, no JVD appreciated  CARDIAC: S1 & S2 auscultated +regular rhythm with premature beats  CHEST: breath sounds clear & equal B/L, no increased WOB, no wheezes/crackles  ABDOMEN: Obese abdomen, bowel sounds present, soft, NTTP  NEURO: CN II-XII grossly intact, no focal deficits  MSKl: R. knee - extension/flexion limited 2/2 pain, no increased warmth, no overt edema, no superficial erythema, NTTP  SKIN: Warm and dry, no diffuse rashes, no wounds  PSYCH: Speech fluid, appropriate mood and affect  EXT: No LE edema, 2+ pedal pulses B/L

## 2017-12-22 NOTE — PROGRESS NOTE ADULT - SUBJECTIVE AND OBJECTIVE BOX
INTERVAL/OVERNIGHT EVENTS:     76 y/o female w/ PMH of morbid obesity, HTN, HLD, aortic stenosis s/p TAVR, asthma, GERD, bilateral knee OA s/p bilateral TKA, admitted for worsening dyspnea x2 days and r. knee pain x1 day. This morning, pt reports r knee pain is mildly improved. She is able to bear weight minimally, but has pain. She denies chest pain, palpitations, chills/n/v/d or pain elsewhere.    Of note, pt reports only taking one dose of clindamycin prophylaxis before dental procedure 3 weeks ago, stopped due to intolerance w/palpitations, dizziness. EKG completed at PCP, and ERIKA completed w/Dr. Roblero on , which were unremarkable.     MEDICATIONS  (STANDING):  buDESOnide 160 MICROgram(s)/formoterol 4.5 MICROgram(s) Inhaler 2 Puff(s) Inhalation two times a day  clopidogrel Tablet 75 milliGRAM(s) Oral daily  docusate sodium 100 milliGRAM(s) Oral two times a day  enoxaparin Injectable 40 milliGRAM(s) SubCutaneous every 24 hours  lisinopril 10 milliGRAM(s) Oral daily  pantoprazole    Tablet 40 milliGRAM(s) Oral before breakfast  piperacillin/tazobactam IVPB. 3.375 Gram(s) IV Intermittent every 8 hours  simvastatin 20 milliGRAM(s) Oral at bedtime  vancomycin  IVPB 1250 milliGRAM(s) IV Intermittent every 12 hours    MEDICATIONS  (PRN):  acetaminophen   Tablet 650 milliGRAM(s) Oral every 6 hours PRN For Temp greater than 38 C (100.4 F)  melatonin 3 milliGRAM(s) Oral at bedtime PRN Insomnia  nystatin Powder 1 Application(s) Topical two times a day PRN rash  oxyCODONE    5 mG/acetaminophen 325 mG 1 Tablet(s) Oral every 6 hours PRN Severe Pain (7 - 10)    Allergies    No Known Allergies    Intolerances    IV Contrast (Flushing (Skin); Nausea)  Clindamycin (palpitations, dizziness)  VITAL SIGNS AND PHYSICAL EXAM:  Vital Signs Last 24 Hrs  T(C): 36.9 (22 Dec 2017 13:21), Max: 38.4 (21 Dec 2017 22:26)  T(F): 98.5 (22 Dec 2017 13:21), Max: 101.1 (21 Dec 2017 22:26)  HR: 75 (22 Dec 2017 13:21) (71 - 90)  BP: 141/68 (22 Dec 2017 13:21) (112/50 - 141/68)  BP(mean): --  RR: 18 (22 Dec 2017 13:21) (13 - 19)  SpO2: 99% (22 Dec 2017 13:21) (96% - 100%)  I&O's Summary      Daily   BMI (kg/m2): 50 (-22 @ 06:30)    Gen: No acute distress, morbidly obese elderly female.  HEENT: Normocephalic, atraumatic, extraocular movements intact, conjunctiva clear without ictuers  CV: Regular rate and rhythm, no murmurs, rubs, or gallops  Resp: Non-labored, clear to auscultation bilaterally  Abd: soft, non-tender, non-distended, obese  Skin: no rash, open wounds  Ext: No apparent LE edema, 2+ pedal pulses b/l  Neuro: grossly normal     INTERVAL LAB RESULTS:                        10.2   8.76  )-----------( 134      ( 21 Dec 2017 22:40 )             32.4                               140    |  103    |  21                  Calcium: 8.7   / iCa: x      ( @ 22:40)    ----------------------------<  99        Magnesium: x                                5.2     |  23     |  1.19             Phosphorous: x        TPro  7.1    /  Alb  3.4    /  TBili  0.2    /  DBili  x      /  AST  30     /  ALT  16     /  AlkPhos  83     21 Dec 2017 22:40    Urinalysis Basic - ( 22 Dec 2017 01:41 )    Color: YELLOW / Appearance: CLEAR / S.024 / pH: 6.0  Gluc: NEGATIVE / Ketone: NEGATIVE  / Bili: NEGATIVE / Urobili: NORMAL mg/dL   Blood: SMALL / Protein: 100 mg/dL / Nitrite: NEGATIVE   Leuk Esterase: NEGATIVE / RBC: 10-25 / WBC 2-5   Sq Epi: OCC / Non Sq Epi: x / Bacteria: FEW        INTERVAL IMAGING STUDIES: INTERVAL/OVERNIGHT EVENTS:     78 y/o female w/ PMH of morbid obesity, HTN, HLD, aortic stenosis s/p TAVR, asthma, GERD, bilateral knee OA s/p bilateral TKA, admitted for worsening dyspnea x2 days and r. knee pain x1 day. This morning, pt reports r knee pain is mildly improved. She is able to bear weight minimally, but has pain. She denies chest pain, palpitations, chills/n/v/d or pain elsewhere.    Of note, pt reports only taking one dose of clindamycin prophylaxis before dental procedure 3 weeks ago, stopped due to intolerance w/palpitations, dizziness. EKG completed at PCP, and ERIKA completed w/Dr. Roblero on , which were unremarkable.     MEDICATIONS  (STANDING):  buDESOnide 160 MICROgram(s)/formoterol 4.5 MICROgram(s) Inhaler 2 Puff(s) Inhalation two times a day  clopidogrel Tablet 75 milliGRAM(s) Oral daily  docusate sodium 100 milliGRAM(s) Oral two times a day  enoxaparin Injectable 40 milliGRAM(s) SubCutaneous every 24 hours  lisinopril 10 milliGRAM(s) Oral daily  pantoprazole    Tablet 40 milliGRAM(s) Oral before breakfast  piperacillin/tazobactam IVPB. 3.375 Gram(s) IV Intermittent every 8 hours  simvastatin 20 milliGRAM(s) Oral at bedtime  vancomycin  IVPB 1250 milliGRAM(s) IV Intermittent every 12 hours    MEDICATIONS  (PRN):  acetaminophen   Tablet 650 milliGRAM(s) Oral every 6 hours PRN For Temp greater than 38 C (100.4 F)  melatonin 3 milliGRAM(s) Oral at bedtime PRN Insomnia  nystatin Powder 1 Application(s) Topical two times a day PRN rash  oxyCODONE    5 mG/acetaminophen 325 mG 1 Tablet(s) Oral every 6 hours PRN Severe Pain (7 - 10)    Allergies    No Known Allergies    Intolerances    IV Contrast (Flushing (Skin); Nausea)  Clindamycin (palpitations, dizziness)  VITAL SIGNS AND PHYSICAL EXAM:  Vital Signs Last 24 Hrs  T(C): 36.9 (22 Dec 2017 13:21), Max: 38.4 (21 Dec 2017 22:26)  T(F): 98.5 (22 Dec 2017 13:21), Max: 101.1 (21 Dec 2017 22:26)  HR: 75 (22 Dec 2017 13:21) (71 - 90)  BP: 141/68 (22 Dec 2017 13:21) (112/50 - 141/68)  BP(mean): --  RR: 18 (22 Dec 2017 13:21) (13 - 19)  SpO2: 99% (22 Dec 2017 13:21) (96% - 100%)  I&O's Summary      Daily   BMI (kg/m2): 50 (-22 @ 06:30)    Gen: No acute distress, morbidly obese elderly female.  HEENT: Normocephalic, atraumatic, extraocular movements intact, conjunctiva clear without ictuers  CV: Regular rate and rhythm, no murmurs, rubs, or gallops  Resp: Non-labored, clear to auscultation bilaterally  Abd: soft, non-tender, non-distended, obese  Skin: no rash, open wounds  Ext: No apparent LE edema, 2+ pedal pulses b/l, R knee tenderness and warmth on palpation  Neuro: grossly normal     INTERVAL LAB RESULTS:                        10.2   8.76  )-----------( 134      ( 21 Dec 2017 22:40 )             32.4                               140    |  103    |  21                  Calcium: 8.7   / iCa: x      ( @ 22:40)    ----------------------------<  99        Magnesium: x                                5.2     |  23     |  1.19             Phosphorous: x        TPro  7.1    /  Alb  3.4    /  TBili  0.2    /  DBili  x      /  AST  30     /  ALT  16     /  AlkPhos  83     21 Dec 2017 22:40    Urinalysis Basic - ( 22 Dec 2017 01:41 )    Color: YELLOW / Appearance: CLEAR / S.024 / pH: 6.0  Gluc: NEGATIVE / Ketone: NEGATIVE  / Bili: NEGATIVE / Urobili: NORMAL mg/dL   Blood: SMALL / Protein: 100 mg/dL / Nitrite: NEGATIVE   Leuk Esterase: NEGATIVE / RBC: 10-25 / WBC 2-5   Sq Epi: OCC / Non Sq Epi: x / Bacteria: FEW        INTERVAL IMAGING STUDIES:

## 2017-12-22 NOTE — PHYSICAL THERAPY INITIAL EVALUATION ADULT - PATIENT PROFILE REVIEW, REHAB EVAL
yes Pt. profile reviewed, consulted with JAS HASSAN prior to initial PT evaluation and tx, as per RN, Pt. is OK to participate in skilled therapy session./yes

## 2017-12-22 NOTE — DISCHARGE NOTE ADULT - COMMUNITY RESOURCES
[facility] located, [address] [place] [address], tele# [phone]. [facility] will transport patient at 4:30pm ,trip# [id number].

## 2017-12-22 NOTE — PHYSICAL THERAPY INITIAL EVALUATION ADULT - SITTING BALANCE: DYNAMIC
pt. requires moderate assistance to maintain sitting balance. in sitting pt. presents with posterior trunk lean./poor balance

## 2017-12-22 NOTE — PROGRESS NOTE ADULT - SUBJECTIVE AND OBJECTIVE BOX
Patient seen and examined.  Complaining of continued right knee pain.  Discussed case with Dr. Bangura.     Vital Signs Last 24 Hrs  T(C): 36.4 (22 Dec 2017 06:30), Max: 38.4 (21 Dec 2017 22:26)  T(F): 97.5 (22 Dec 2017 06:30), Max: 101.1 (21 Dec 2017 22:26)  HR: 71 (22 Dec 2017 06:30) (71 - 90)  BP: 115/73 (22 Dec 2017 06:30) (112/50 - 136/71)  BP(mean): --  RR: 19 (22 Dec 2017 06:30) (13 - 19)  SpO2: 96% (22 Dec 2017 06:30) (96% - 100%)    Gen:  RLE: Skin no erythema  Well healed surgical scar  EHL/FHL/TA/Gs  SILT DP/SP/Stone/Sa/T  WWP dsitally  MOderate effusion.                            10.2   8.76  )-----------( 134      ( 21 Dec 2017 22:40 )             32.4       12-21    140  |  103  |  21  ----------------------------<  99  5.2   |  23  |  1.19      ESR: 74       A/P 77 year old female s/p b/l TKA with right knee swellign/pain and elevated ESR and CRP  Right knee aspirated under usual sterile precautions.  90 cc of fluid aspirated.  Sent for cell count, gram stain , crystals, and culture. Cultures should be incubated for 14 days since she recieved antibiotics before tap.  WBAT  Care per primary team  FU cultures  Will follow.

## 2017-12-22 NOTE — PROGRESS NOTE ADULT - PROBLEM SELECTOR PLAN 1
-s/p arthrocentesis  -Continue zosyn q8h, vanco q12  -tylenol PRN fevers  -percocet q6h PRN pain  -procedure tomorrow per ortho  -f/u blood cultures – should be for 14 days since she recieved antibiotics before tap  -NPO after midnight -s/p arthrocentesis  -Continue zosyn q8h, vanco q12  -tylenol PRN fevers  -percocet q6h PRN pain  -procedure tomorrow per ortho  -f/u blood cultures – should be for 14 days since she recieved antibiotics before tap  -NPO after midnight  -AM CBC, BMP mg, phos  -AM PT, PTT, INR   -AM type/screen

## 2017-12-22 NOTE — CONSULT NOTE ADULT - SUBJECTIVE AND OBJECTIVE BOX
77y Female with history of morbidly obesity, severe AS status-post TAVR, Asthma, sciatica, polymyalgia and b/l TKA with Dr. Bangura, last 15 years ago, presents c/o R knee pain since this morning. Pt states difficulty walking because of pain but is able to range the knee without significant pain. Pt denies radiation of pain. Pt denies numbness, tingling, or burning. Pt denies hx of trauma. +fever. Pt is community ambulator at baseline.    PMH:  CAD (coronary artery disease)  Aortic stenosis, severe  Uncomplicated asthma, unspecified asthma severity  Polymyalgia  Lumbar disc disease with radiculopathy  Spider veins  Anxiety  Severe aortic stenosis by prior echocardiogram  Dysphagia  Morbid obesity with BMI of 50.0-59.9, adult  Macular degeneration  Hiatal hernia  GERD (gastroesophageal reflux disease)  Sciatica  Osteoarthritis  HTN (hypertension)    PSH:  H/O cardiac catheterization  H/O endoscopy  S/P cataract surgery  S/P gastroplasty  S/P cholecystectomy  S/P total knee replacement  S/P total knee replacement  S/P hysterectomy    AH:    Meds: See med rec    T(C): 38.4 (12-21-17 @ 22:26)  HR: 85 (12-21-17 @ 22:26)  BP: 126/79 (12-21-17 @ 22:26)  RR: 16 (12-21-17 @ 22:26)  SpO2: 100% (12-21-17 @ 22:26)  Wt(kg): --    RLE:  Skin intact, 10cm surgical scar over anterior knee, mild soft tissue swelling  + TTP over Knee  PROM 0-100 without pain, pain only on deep flexion  SILT L2-S1, +EHL/FHL/TA/Gastroc, soft compartments, no calf ttp  wwp    A/P:     77yFemale w/ h/o b/l TKA p/w fever and R knee pain. Low clinical concern for septic TKA given exam and laboratory findings  - Pain control  - DVT ppx per primary team  - WBAT RLE  - FU ESR/CRP  - FU imaging  - No acute orthopedic intervention at this time. Please contact orthopedics if clinical status declines  - FU with orthopedics as outpatient. Call [phone] for appointment 77y Female with history of morbidly obesity, severe AS status-post TAVR, Asthma, sciatica, polymyalgia and b/l TKA with Dr. Bangura, last 15 years ago, presents c/o R knee pain since this morning. Pt states difficulty walking because of pain but is able to range the knee without significant pain. Pt denies radiation of pain. Pt denies numbness, tingling, or burning. Pt denies hx of trauma. +fever. Pt is community ambulator at baseline.    PMH:  CAD (coronary artery disease)  Aortic stenosis, severe  Uncomplicated asthma, unspecified asthma severity  Polymyalgia  Lumbar disc disease with radiculopathy  Spider veins  Anxiety  Severe aortic stenosis by prior echocardiogram  Dysphagia  Morbid obesity with BMI of 50.0-59.9, adult  Macular degeneration  Hiatal hernia  GERD (gastroesophageal reflux disease)  Sciatica  Osteoarthritis  HTN (hypertension)    PSH:  H/O cardiac catheterization  H/O endoscopy  S/P cataract surgery  S/P gastroplasty  S/P cholecystectomy  S/P total knee replacement  S/P total knee replacement  S/P hysterectomy    AH:    Meds: See med rec    T(C): 38.4 (12-21-17 @ 22:26)  HR: 85 (12-21-17 @ 22:26)  BP: 126/79 (12-21-17 @ 22:26)  RR: 16 (12-21-17 @ 22:26)  SpO2: 100% (12-21-17 @ 22:26)  Wt(kg): --    RLE:  Skin intact, 10cm surgical scar over anterior knee, mild soft tissue swelling  + TTP over Knee  PROM 0-100 without pain, pain only on deep flexion  SILT L2-S1, +EHL/FHL/TA/Gastroc, soft compartments, no calf ttp  wwp    A/P:     77yFemale w/ h/o b/l TKA p/w fever and R knee pain. Low clinical concern for septic TKA given exam and laboratory findings  - Pain control  - DVT ppx per primary team  - WBAT RLE  - FU ESR/CRP  - FU imaging  - No acute orthopedic intervention at this time. Please contact orthopedics if clinical status declines

## 2017-12-22 NOTE — PROGRESS NOTE ADULT - ATTENDING COMMENTS
I agree with the above note and have personally seen and examined this patient. All pertinent films have been reviewed. Please refer to clinical documentation of the history, physical examinations, data summary, and both assessment and plan as documented above and with which I agree.    Reviewed all notes on this patient. I saw her and had a longer than 40 minute conversation with the patient and her . Had dental abscess 3 weeks ago and 24-48 hours knee pain. Has right septic knee with elevated cell count, ESR, CRP (see labs). Gram stain negative. Pending cultures. As knee aspirate was performed after ED gave patient 2x abx may be culture negative infection in this case. Exam is RLE 0-90, pain at 90 deg flexion. Stable V/V/AP stress. SILT L4-S1, 2+ dp pulse, 5/5 TA/EHL/GCS. Pain with bearing weight.     R septic knee requires operative intervention. We had a prolonged discussion about I&D and PE exchange versus 2-stage exchange with static versus articulating spacer placement. Situation complicating by the fact that PE unavailable by company to perform PE exchange as they are 15 years old. In this case patient and her  request 2-stage exchange.    The patient is an appropriate candidate for consideration of right TKA explantation, I&D and static versus articulating spacer placement. This recommendation is based on the patient’s pain, function, and bone stock. An extensive discussion was conducted of the natural history of this particular problem and the variety of surgical and non-surgical treatment options available to the patient. A risk/benefit analysis was discussed with the patient reviewing the advantages and disadvantages of surgical intervention at this time. A full explanation was given of the nature and the purpose of the procedure and anesthesia, its benefits, possible alternative methods of diagnosis or treatment, the risks involved, the possibility of complications, the foreseeable consequences of the procedure and the possible results of the non-treatment.  No guarantee or assurance was made as to the results that may be obtained. They understand that if this is a culture negative infection then success of a 2-stage revision surgery may be 60-70% for infection clearance and she still may require more than 1 I&D. No guarantee was made for reimplantation at the 2nd stage and she understand that another infection may recur later after reimplantation even if cultures remain negative. Specifically, the risks were identified to include, but are not limited to the following: inability to clear the infection or new infection, phlebitis, pulmonary embolism, death, paralysis, dislocation, pain, stiffness, instability, limp, weakness, breakage, leg-length inequality, uncontrolled bleeding, nerve injury, blood vessel injury, pressure sores, anesthetic risks, delayed healing of wound and bone, and wear and loosening. She may have chronic pain and may require plastic surgery closure should she develop wound healing issues. Further discussion was undertaken with the patient about the details of surgical preparation, treatment, and postoperative rehabilitation including medical clearance, transfusion, the hospital course, and the postoperative rehabilitation involved. No guarantee was made for timing of reimplantation but minimum 3 months required between stages. She will require 6 weeks IV antibiotics after and at 2nd stage reimplantation 6 months oral antibiotics.  All in all, I feel that this patient is a good candidate for surgical intervention.      Nate Bass MD  Attending Orthopedic Surgeon

## 2017-12-22 NOTE — DISCHARGE NOTE ADULT - HOSPITAL COURSE
77F with morbid obesity, HTN, HLD, AS s/p TAVR, asthma, GERD, bilateral knee OA s/p bilateral TKA (2002), polymyalgia on chronic prednisone presents for dyspnea and right knee pain. Patient reports chronic h/o dyspnea at baseline, especially with ambulation, unclear whether acutely worsening with last 2 days. She denies chest pain, leg swelling but admits to sleeping upright in recliner chair nightly. Patient follows with Cardiologist Dr. Roverto Roblero, recently seen in clinic for TTE on 12/19 which was relatively unremarkable. She does endorse rapid heart rate with chest pain 3 weeks ago after taking clindamycin prophylaxis for dental procedure (due to B/L TKR and TAVR). She endorses intolerance to amoxicillin in past due to "shakes" but denies throat swelling, wheezing, shortness of breath. Since then patient states it has been "all downhill from there." She endorses chills but no fever, night sweats, nausea, vomiting or abdominal pain. Yesterday, she  experienced acute onset of right knee pain, able to bear only minimal weight on right foot, difficulty with ambulation (ambulates without aides within home but with walker wheels elsewhere). She states right knee "feels more swollen" but does not exactly appear that way.     In ED, patient febrile to 101. Infectious work up performed. Given vancomycin x1, Zosyn x1, 0.9NS x 1L, morphine 4 IV, Toradol 15 IV and Tylenol 1g IV.     Pt was admitted w/suspected septic arthritis. Arthrocentesis was drawn and 90cc of fluid/blood were extracted. Patient underwent ____ on 12/23. 77F with morbid obesity, HTN, HLD, AS s/p TAVR, asthma, GERD, bilateral knee OA s/p bilateral TKA (2002), polymyalgia on chronic prednisone presents for dyspnea and right knee pain. Patient reports chronic h/o dyspnea at baseline, especially with ambulation, unclear whether acutely worsening with last 2 days. She denies chest pain, leg swelling but admits to sleeping upright in recliner chair nightly. Patient follows with Cardiologist Dr. Roverto Roblero, recently seen in clinic for TTE on 12/19 which was relatively unremarkable. She does endorse rapid heart rate with chest pain 3 weeks ago after taking clindamycin prophylaxis for dental procedure (due to B/L TKR and TAVR). She endorses intolerance to amoxicillin in past due to "shakes" but denies throat swelling, wheezing, shortness of breath. Since then patient states it has been "all downhill from there." She endorses chills but no fever, night sweats, nausea, vomiting or abdominal pain. Yesterday, she  experienced acute onset of right knee pain, able to bear only minimal weight on right foot, difficulty with ambulation (ambulates without aides within home but with walker wheels elsewhere). She states right knee "feels more swollen" but does not exactly appear that way.     In ED, patient febrile to 101. Infectious work up performed. Given vancomycin x1, Zosyn x1, 0.9NS x 1L, morphine 4 IV, Toradol 15 IV and Tylenol 1g IV.     Pt was admitted w/suspected septic arthritis. Arthrocentesis was drawn and 90cc of fluid/blood were extracted. Blood cultures positive for strep sp. Underwent explantation of R knee hardware with washout and spacer placement. Patient had significant blood loss and required massive resuscitation. Patient extubated at conclusion of procedure. SICU consulted for continued post-operative resuscitation. Patient maintained on low dose phenylephrine gtt intra-operatively. Admitted to SICU for resuscitation and close monitoring. Transferred back to medicine service once stable on 12/26. Patient transitioned to PO analgesics with good pain control. PICC line placed on 12/27. Repeat TTE showed ____. 77F with morbid obesity, HTN, HLD, AS s/p TAVR, asthma, GERD, bilateral knee OA s/p bilateral TKA (2002), polymyalgia on chronic prednisone presents for dyspnea and right knee pain. Patient reports chronic h/o dyspnea at baseline, especially with ambulation, unclear whether acutely worsening with last 2 days. She denies chest pain, leg swelling but admits to sleeping upright in recliner chair nightly. Patient follows with Cardiologist Dr. Roverto Roblero, recently seen in clinic for TTE on 12/19 which was relatively unremarkable. She does endorse rapid heart rate with chest pain 3 weeks ago after taking clindamycin prophylaxis for dental procedure (due to B/L TKR and TAVR). She endorses intolerance to amoxicillin in past due to "shakes" but denies throat swelling, wheezing, shortness of breath. Since then patient states it has been "all downhill from there." She endorses chills but no fever, night sweats, nausea, vomiting or abdominal pain. Yesterday, she  experienced acute onset of right knee pain, able to bear only minimal weight on right foot, difficulty with ambulation (ambulates without aides within home but with walker wheels elsewhere). She states right knee "feels more swollen" but does not exactly appear that way.     In ED, patient febrile to 101. Infectious work up performed. Given vancomycin x1, Zosyn x1, 0.9NS x 1L, morphine 4 IV, Toradol 15 IV and Tylenol 1g IV.     Pt was admitted w/suspected septic arthritis. Arthrocentesis was drawn and 90cc of fluid/blood were extracted. Blood cultures positive for strep sp. Underwent explantation of R knee hardware with washout and spacer placement. Patient had significant blood loss and required massive resuscitation. Patient extubated at conclusion of procedure. SICU consulted for continued post-operative resuscitation. Patient maintained on low dose phenylephrine gtt intra-operatively. Admitted to SICU for resuscitation and close monitoring. Transferred back to medicine service once stable on 12/26. Patient transitioned to PO analgesics with good pain control. PICC line placed on 12/28. Repeat TTE was unchanged from TTE on 12/19, and showed no evidence of endocarditis. 77F with morbid obesity, HTN, HLD, AS s/p TAVR, asthma, GERD, bilateral knee OA s/p bilateral TKA (2002), polymyalgia on chronic prednisone presents for dyspnea and right knee pain. Patient reports chronic h/o dyspnea at baseline, especially with ambulation, unclear whether acutely worsening with last 2 days. She denies chest pain, leg swelling but admits to sleeping upright in recliner chair nightly. Patient follows with Cardiologist Dr. Roverto Roblero, recently seen in clinic for TTE on 12/19 which was relatively unremarkable. She does endorse rapid heart rate with chest pain 3 weeks ago after taking clindamycin prophylaxis for dental procedure (due to B/L TKR and TAVR). She endorses intolerance to amoxicillin in past due to "shakes" but denies throat swelling, wheezing, shortness of breath. Since then patient states it has been "all downhill from there." She endorses chills but no fever, night sweats, nausea, vomiting or abdominal pain. Yesterday, she  experienced acute onset of right knee pain, able to bear only minimal weight on right foot, difficulty with ambulation (ambulates without aides within home but with walker wheels elsewhere). She states right knee "feels more swollen" but does not exactly appear that way.     In ED, patient febrile to 101. Infectious work up performed. Given vancomycin x1, Zosyn x1, 0.9NS x 1L, morphine 4 IV, Toradol 15 IV and Tylenol 1g IV.     Pt was admitted w/suspected septic arthritis. Arthrocentesis was drawn and 90cc of fluid/blood were extracted. Blood cultures positive for strep sanguinous. Underwent explantation of R knee hardware with washout and spacer placement. Patient had significant blood loss and required massive resuscitation. Patient extubated at conclusion of procedure. SICU consulted for continued post-operative resuscitation. Patient maintained on low dose phenylephrine gtt intra-operatively. Admitted to SICU for resuscitation and close monitoring. Transferred back to medicine service once stable on 12/26. Patient transitioned to PO analgesics with good pain control. PICC line placed on 12/28 for extended IV abx requirement. Repeat TTE was unchanged from TTE on 12/19, and showed no evidence of endocarditis. Pt tolerating IV ceftriaxone well with no reported side effects. She was able to get OOB w/assistance on day of discharge.     Per ID, patient will require weekly bloodwork, including CBCw/diff, CMP, ESR, and CRP upon discharge. Patient instructed to follow up with PCP and ID closely, as she may require an oral antibiotic regimen after completing IV abx. Patient was deemed stable and safe for discharge by orthopedics, infectious disease, cardiology, as well as the primary team. 77F with morbid obesity, HTN, HLD, AS s/p TAVR, asthma, GERD, bilateral knee OA s/p bilateral TKA (2002), polymyalgia on chronic prednisone presents for dyspnea and right knee pain. Patient reports chronic h/o dyspnea at baseline, especially with ambulation, unclear whether acutely worsening with last 2 days. She denies chest pain, leg swelling but admits to sleeping upright in recliner chair nightly. Patient follows with Cardiologist Dr. Roverto Roblero, recently seen in clinic for TTE on 12/19 which was relatively unremarkable. She does endorse rapid heart rate with chest pain 3 weeks ago after taking clindamycin prophylaxis for dental procedure (due to B/L TKR and TAVR). She endorses intolerance to amoxicillin in past due to "shakes" but denies throat swelling, wheezing, shortness of breath. Since then patient states it has been "all downhill from there." She endorses chills but no fever, night sweats, nausea, vomiting or abdominal pain. Yesterday, she  experienced acute onset of right knee pain, able to bear only minimal weight on right foot, difficulty with ambulation (ambulates without aides within home but with walker wheels elsewhere). She states right knee "feels more swollen" but does not exactly appear that way.     In ED, patient febrile to 101. Infectious work up performed. Given vancomycin x1, Zosyn x1, 0.9NS x 1L, morphine 4 IV, Toradol 15 IV and Tylenol 1g IV.     Pt was admitted w/ septic arthritis likely 2/2 to strep sanguinous bacteremia. Arthrocentesis was drawn and 90cc of fluid/blood were extracted. Blood cultures positive for strep sanguinous. Underwent explantation of R knee hardware with washout and spacer placement. Patient had significant blood loss and required massive resuscitation. Patient extubated at conclusion of procedure. SICU consulted for continued post-operative resuscitation. Patient maintained on low dose phenylephrine gtt intra-operatively. Admitted to SICU for resuscitation and close monitoring. Transferred back to medicine service once stable on 12/26. Patient transitioned to PO analgesics with good pain control. PICC line placed on 12/28 for extended IV abx requirement. Repeat TTE was unchanged from TTE on 12/19, and showed no evidence of endocarditis. Pt tolerating IV ceftriaxone well with no reported side effects. She was able to get OOB w/assistance on day of discharge.     Per ID, patient will require weekly bloodwork, including CBCw/diff, CMP, ESR, and CRP upon discharge. Patient instructed to follow up with PCP and ID closely, as she may require an oral antibiotic regimen after completing IV abx. Patient was deemed stable and safe for discharge by orthopedics, infectious disease, cardiology, as well as the primary team.     DISCHARGE TIME- 35 MINUTES

## 2017-12-23 ENCOUNTER — RESULT REVIEW (OUTPATIENT)
Age: 77
End: 2017-12-23

## 2017-12-23 ENCOUNTER — TRANSCRIPTION ENCOUNTER (OUTPATIENT)
Age: 77
End: 2017-12-23

## 2017-12-23 LAB
APTT BLD: 29.5 SEC — SIGNIFICANT CHANGE UP (ref 27.5–37.4)
APTT BLD: 30 SEC — SIGNIFICANT CHANGE UP (ref 27.5–37.4)
APTT BLD: 35.5 SEC — SIGNIFICANT CHANGE UP (ref 27.5–37.4)
BACTERIA UR CULT: SIGNIFICANT CHANGE UP
BASE EXCESS BLDA CALC-SCNC: -0.6 MMOL/L — SIGNIFICANT CHANGE UP
BASE EXCESS BLDA CALC-SCNC: -1.8 MMOL/L — SIGNIFICANT CHANGE UP
BASE EXCESS BLDA CALC-SCNC: -2.5 MMOL/L — SIGNIFICANT CHANGE UP
BASE EXCESS BLDA CALC-SCNC: -3.6 MMOL/L — SIGNIFICANT CHANGE UP
BASE EXCESS BLDA CALC-SCNC: -3.6 MMOL/L — SIGNIFICANT CHANGE UP
BASOPHILS # BLD AUTO: 0.02 K/UL — SIGNIFICANT CHANGE UP (ref 0–0.2)
BASOPHILS NFR BLD AUTO: 0.3 % — SIGNIFICANT CHANGE UP (ref 0–2)
BLD GP AB SCN SERPL QL: POSITIVE — SIGNIFICANT CHANGE UP
BUN SERPL-MCNC: 13 MG/DL — SIGNIFICANT CHANGE UP (ref 7–23)
BUN SERPL-MCNC: 16 MG/DL — SIGNIFICANT CHANGE UP (ref 7–23)
BUN SERPL-MCNC: 20 MG/DL — SIGNIFICANT CHANGE UP (ref 7–23)
CA-I BLDA-SCNC: 0.87 MMOL/L — LOW (ref 1.15–1.29)
CA-I BLDA-SCNC: 1.08 MMOL/L — LOW (ref 1.15–1.29)
CA-I BLDA-SCNC: 1.11 MMOL/L — LOW (ref 1.15–1.29)
CA-I BLDA-SCNC: 1.11 MMOL/L — LOW (ref 1.15–1.29)
CA-I BLDA-SCNC: 1.14 MMOL/L — LOW (ref 1.15–1.29)
CALCIUM SERPL-MCNC: 7.2 MG/DL — LOW (ref 8.4–10.5)
CALCIUM SERPL-MCNC: 7.3 MG/DL — LOW (ref 8.4–10.5)
CALCIUM SERPL-MCNC: 7.9 MG/DL — LOW (ref 8.4–10.5)
CHLORIDE SERPL-SCNC: 101 MMOL/L — SIGNIFICANT CHANGE UP (ref 98–107)
CHLORIDE SERPL-SCNC: 108 MMOL/L — HIGH (ref 98–107)
CHLORIDE SERPL-SCNC: 108 MMOL/L — HIGH (ref 98–107)
CO2 SERPL-SCNC: 21 MMOL/L — LOW (ref 22–31)
CO2 SERPL-SCNC: 22 MMOL/L — SIGNIFICANT CHANGE UP (ref 22–31)
CO2 SERPL-SCNC: 23 MMOL/L — SIGNIFICANT CHANGE UP (ref 22–31)
CREAT SERPL-MCNC: 0.85 MG/DL — SIGNIFICANT CHANGE UP (ref 0.5–1.3)
CREAT SERPL-MCNC: 0.94 MG/DL — SIGNIFICANT CHANGE UP (ref 0.5–1.3)
CREAT SERPL-MCNC: 1.15 MG/DL — SIGNIFICANT CHANGE UP (ref 0.5–1.3)
DAT C3-SP REAG RBC QL: NEGATIVE — SIGNIFICANT CHANGE UP
DAT POLY-SP REAG RBC QL: NEGATIVE — SIGNIFICANT CHANGE UP
DIRECT COOMBS IGG: NEGATIVE — SIGNIFICANT CHANGE UP
EOSINOPHIL # BLD AUTO: 0.18 K/UL — SIGNIFICANT CHANGE UP (ref 0–0.5)
EOSINOPHIL NFR BLD AUTO: 2.9 % — SIGNIFICANT CHANGE UP (ref 0–6)
GLUCOSE BLDA-MCNC: 106 MG/DL — HIGH (ref 70–99)
GLUCOSE BLDA-MCNC: 116 MG/DL — HIGH (ref 70–99)
GLUCOSE BLDA-MCNC: 133 MG/DL — HIGH (ref 70–99)
GLUCOSE BLDA-MCNC: 154 MG/DL — HIGH (ref 70–99)
GLUCOSE BLDA-MCNC: 172 MG/DL — HIGH (ref 70–99)
GLUCOSE SERPL-MCNC: 100 MG/DL — HIGH (ref 70–99)
GLUCOSE SERPL-MCNC: 119 MG/DL — HIGH (ref 70–99)
GLUCOSE SERPL-MCNC: 141 MG/DL — HIGH (ref 70–99)
GRAM STN WND: SIGNIFICANT CHANGE UP
HCO3 BLDA-SCNC: 21 MMOL/L — LOW (ref 22–26)
HCO3 BLDA-SCNC: 22 MMOL/L — SIGNIFICANT CHANGE UP (ref 22–26)
HCO3 BLDA-SCNC: 22 MMOL/L — SIGNIFICANT CHANGE UP (ref 22–26)
HCO3 BLDA-SCNC: 23 MMOL/L — SIGNIFICANT CHANGE UP (ref 22–26)
HCO3 BLDA-SCNC: 24 MMOL/L — SIGNIFICANT CHANGE UP (ref 22–26)
HCT VFR BLD CALC: 24.9 % — LOW (ref 34.5–45)
HCT VFR BLD CALC: 26.5 % — LOW (ref 34.5–45)
HCT VFR BLD CALC: 29 % — LOW (ref 34.5–45)
HCT VFR BLDA CALC: 20.6 % — CRITICAL LOW (ref 34.5–46.5)
HCT VFR BLDA CALC: 26.2 % — LOW (ref 34.5–46.5)
HCT VFR BLDA CALC: 26.8 % — LOW (ref 34.5–46.5)
HCT VFR BLDA CALC: 27.3 % — LOW (ref 34.5–46.5)
HCT VFR BLDA CALC: 27.3 % — LOW (ref 34.5–46.5)
HGB BLD-MCNC: 8.3 G/DL — LOW (ref 11.5–15.5)
HGB BLD-MCNC: 8.6 G/DL — LOW (ref 11.5–15.5)
HGB BLD-MCNC: 8.9 G/DL — LOW (ref 11.5–15.5)
HGB BLDA-MCNC: 6.6 G/DL — CRITICAL LOW (ref 11.5–15.5)
HGB BLDA-MCNC: 8.4 G/DL — LOW (ref 11.5–15.5)
HGB BLDA-MCNC: 8.6 G/DL — LOW (ref 11.5–15.5)
HGB BLDA-MCNC: 8.8 G/DL — LOW (ref 11.5–15.5)
HGB BLDA-MCNC: 8.8 G/DL — LOW (ref 11.5–15.5)
IMM GRANULOCYTES # BLD AUTO: 0.01 # — SIGNIFICANT CHANGE UP
IMM GRANULOCYTES NFR BLD AUTO: 0.2 % — SIGNIFICANT CHANGE UP (ref 0–1.5)
INR BLD: 1.07 — SIGNIFICANT CHANGE UP (ref 0.88–1.17)
INR BLD: 1.12 — SIGNIFICANT CHANGE UP (ref 0.88–1.17)
INR BLD: 1.18 — HIGH (ref 0.88–1.17)
LACTATE BLDA-SCNC: 0.9 MMOL/L — SIGNIFICANT CHANGE UP (ref 0.5–2)
LACTATE BLDA-SCNC: 1 MMOL/L — SIGNIFICANT CHANGE UP (ref 0.5–2)
LYMPHOCYTES # BLD AUTO: 2.11 K/UL — SIGNIFICANT CHANGE UP (ref 1–3.3)
LYMPHOCYTES # BLD AUTO: 34.5 % — SIGNIFICANT CHANGE UP (ref 13–44)
MAGNESIUM SERPL-MCNC: 1.7 MG/DL — SIGNIFICANT CHANGE UP (ref 1.6–2.6)
MAGNESIUM SERPL-MCNC: 1.8 MG/DL — SIGNIFICANT CHANGE UP (ref 1.6–2.6)
MAGNESIUM SERPL-MCNC: 1.9 MG/DL — SIGNIFICANT CHANGE UP (ref 1.6–2.6)
MCHC RBC-ENTMCNC: 24.6 PG — LOW (ref 27–34)
MCHC RBC-ENTMCNC: 27.6 PG — SIGNIFICANT CHANGE UP (ref 27–34)
MCHC RBC-ENTMCNC: 28.1 PG — SIGNIFICANT CHANGE UP (ref 27–34)
MCHC RBC-ENTMCNC: 30.7 % — LOW (ref 32–36)
MCHC RBC-ENTMCNC: 32.5 % — SIGNIFICANT CHANGE UP (ref 32–36)
MCHC RBC-ENTMCNC: 33.3 % — SIGNIFICANT CHANGE UP (ref 32–36)
MCV RBC AUTO: 80.1 FL — SIGNIFICANT CHANGE UP (ref 80–100)
MCV RBC AUTO: 84.4 FL — SIGNIFICANT CHANGE UP (ref 80–100)
MCV RBC AUTO: 84.9 FL — SIGNIFICANT CHANGE UP (ref 80–100)
METHOD TYPE: SIGNIFICANT CHANGE UP
MONOCYTES # BLD AUTO: 0.54 K/UL — SIGNIFICANT CHANGE UP (ref 0–0.9)
MONOCYTES NFR BLD AUTO: 8.8 % — SIGNIFICANT CHANGE UP (ref 2–14)
NEUTROPHILS # BLD AUTO: 3.26 K/UL — SIGNIFICANT CHANGE UP (ref 1.8–7.4)
NEUTROPHILS NFR BLD AUTO: 53.3 % — SIGNIFICANT CHANGE UP (ref 43–77)
NRBC # FLD: 0 — SIGNIFICANT CHANGE UP
ORGANISM # SPEC MICROSCOPIC CNT: SIGNIFICANT CHANGE UP
ORGANISM # SPEC MICROSCOPIC CNT: SIGNIFICANT CHANGE UP
PCO2 BLDA: 33 MMHG — SIGNIFICANT CHANGE UP (ref 32–48)
PCO2 BLDA: 34 MMHG — SIGNIFICANT CHANGE UP (ref 32–48)
PCO2 BLDA: 40 MMHG — SIGNIFICANT CHANGE UP (ref 32–48)
PCO2 BLDA: 41 MMHG — SIGNIFICANT CHANGE UP (ref 32–48)
PCO2 BLDA: 41 MMHG — SIGNIFICANT CHANGE UP (ref 32–48)
PH BLDA: 7.35 PH — SIGNIFICANT CHANGE UP (ref 7.35–7.45)
PH BLDA: 7.36 PH — SIGNIFICANT CHANGE UP (ref 7.35–7.45)
PH BLDA: 7.37 PH — SIGNIFICANT CHANGE UP (ref 7.35–7.45)
PH BLDA: 7.4 PH — SIGNIFICANT CHANGE UP (ref 7.35–7.45)
PH BLDA: 7.46 PH — HIGH (ref 7.35–7.45)
PHOSPHATE SERPL-MCNC: 3.7 MG/DL — SIGNIFICANT CHANGE UP (ref 2.5–4.5)
PHOSPHATE SERPL-MCNC: 3.9 MG/DL — SIGNIFICANT CHANGE UP (ref 2.5–4.5)
PHOSPHATE SERPL-MCNC: 4.1 MG/DL — SIGNIFICANT CHANGE UP (ref 2.5–4.5)
PLATELET # BLD AUTO: 100 K/UL — LOW (ref 150–400)
PLATELET # BLD AUTO: 105 K/UL — LOW (ref 150–400)
PLATELET # BLD AUTO: 121 K/UL — LOW (ref 150–400)
PMV BLD: 11 FL — SIGNIFICANT CHANGE UP (ref 7–13)
PMV BLD: 11.5 FL — SIGNIFICANT CHANGE UP (ref 7–13)
PMV BLD: 11.6 FL — SIGNIFICANT CHANGE UP (ref 7–13)
PO2 BLDA: 151 MMHG — HIGH (ref 83–108)
PO2 BLDA: 292 MMHG — HIGH (ref 83–108)
PO2 BLDA: 344 MMHG — HIGH (ref 83–108)
PO2 BLDA: 366 MMHG — HIGH (ref 83–108)
PO2 BLDA: 91 MMHG — SIGNIFICANT CHANGE UP (ref 83–108)
POTASSIUM BLDA-SCNC: 3.8 MMOL/L — SIGNIFICANT CHANGE UP (ref 3.4–4.5)
POTASSIUM BLDA-SCNC: 3.8 MMOL/L — SIGNIFICANT CHANGE UP (ref 3.4–4.5)
POTASSIUM BLDA-SCNC: 4 MMOL/L — SIGNIFICANT CHANGE UP (ref 3.4–4.5)
POTASSIUM BLDA-SCNC: 4.1 MMOL/L — SIGNIFICANT CHANGE UP (ref 3.4–4.5)
POTASSIUM BLDA-SCNC: 4.5 MMOL/L — SIGNIFICANT CHANGE UP (ref 3.4–4.5)
POTASSIUM SERPL-MCNC: 4.2 MMOL/L — SIGNIFICANT CHANGE UP (ref 3.5–5.3)
POTASSIUM SERPL-MCNC: 4.3 MMOL/L — SIGNIFICANT CHANGE UP (ref 3.5–5.3)
POTASSIUM SERPL-MCNC: 4.5 MMOL/L — SIGNIFICANT CHANGE UP (ref 3.5–5.3)
POTASSIUM SERPL-SCNC: 4.2 MMOL/L — SIGNIFICANT CHANGE UP (ref 3.5–5.3)
POTASSIUM SERPL-SCNC: 4.3 MMOL/L — SIGNIFICANT CHANGE UP (ref 3.5–5.3)
POTASSIUM SERPL-SCNC: 4.5 MMOL/L — SIGNIFICANT CHANGE UP (ref 3.5–5.3)
PROTHROM AB SERPL-ACNC: 12.3 SEC — SIGNIFICANT CHANGE UP (ref 9.8–13.1)
PROTHROM AB SERPL-ACNC: 12.9 SEC — SIGNIFICANT CHANGE UP (ref 9.8–13.1)
PROTHROM AB SERPL-ACNC: 13.1 SEC — SIGNIFICANT CHANGE UP (ref 9.8–13.1)
RBC # BLD: 2.95 M/UL — LOW (ref 3.8–5.2)
RBC # BLD: 3.12 M/UL — LOW (ref 3.8–5.2)
RBC # BLD: 3.62 M/UL — LOW (ref 3.8–5.2)
RBC # FLD: 15.2 % — HIGH (ref 10.3–14.5)
RBC # FLD: 15.3 % — HIGH (ref 10.3–14.5)
RBC # FLD: 16.1 % — HIGH (ref 10.3–14.5)
RH IG SCN BLD-IMP: POSITIVE — SIGNIFICANT CHANGE UP
SAO2 % BLDA: 100 % — HIGH (ref 95–99)
SAO2 % BLDA: 97.1 % — SIGNIFICANT CHANGE UP (ref 95–99)
SAO2 % BLDA: 99 % — SIGNIFICANT CHANGE UP (ref 95–99)
SAO2 % BLDA: 99.9 % — HIGH (ref 95–99)
SAO2 % BLDA: 99.9 % — HIGH (ref 95–99)
SODIUM BLDA-SCNC: 131 MMOL/L — LOW (ref 136–146)
SODIUM BLDA-SCNC: 132 MMOL/L — LOW (ref 136–146)
SODIUM BLDA-SCNC: 134 MMOL/L — LOW (ref 136–146)
SODIUM BLDA-SCNC: 134 MMOL/L — LOW (ref 136–146)
SODIUM BLDA-SCNC: 135 MMOL/L — LOW (ref 136–146)
SODIUM SERPL-SCNC: 137 MMOL/L — SIGNIFICANT CHANGE UP (ref 135–145)
SODIUM SERPL-SCNC: 140 MMOL/L — SIGNIFICANT CHANGE UP (ref 135–145)
SODIUM SERPL-SCNC: 140 MMOL/L — SIGNIFICANT CHANGE UP (ref 135–145)
SPECIMEN SOURCE: SIGNIFICANT CHANGE UP
VANCOMYCIN TROUGH SERPL-MCNC: 23.6 UG/ML — HIGH (ref 10–20)
WBC # BLD: 6.12 K/UL — SIGNIFICANT CHANGE UP (ref 3.8–10.5)
WBC # BLD: 8.53 K/UL — SIGNIFICANT CHANGE UP (ref 3.8–10.5)
WBC # BLD: 8.72 K/UL — SIGNIFICANT CHANGE UP (ref 3.8–10.5)
WBC # FLD AUTO: 6.12 K/UL — SIGNIFICANT CHANGE UP (ref 3.8–10.5)
WBC # FLD AUTO: 8.53 K/UL — SIGNIFICANT CHANGE UP (ref 3.8–10.5)
WBC # FLD AUTO: 8.72 K/UL — SIGNIFICANT CHANGE UP (ref 3.8–10.5)

## 2017-12-23 PROCEDURE — 99233 SBSQ HOSP IP/OBS HIGH 50: CPT | Mod: GC

## 2017-12-23 PROCEDURE — 27488 REMOVAL OF KNEE PROSTHESIS: CPT

## 2017-12-23 PROCEDURE — 99291 CRITICAL CARE FIRST HOUR: CPT

## 2017-12-23 PROCEDURE — 88300 SURGICAL PATH GROSS: CPT | Mod: 26

## 2017-12-23 PROCEDURE — 99222 1ST HOSP IP/OBS MODERATE 55: CPT | Mod: GC

## 2017-12-23 PROCEDURE — 99232 SBSQ HOSP IP/OBS MODERATE 35: CPT

## 2017-12-23 PROCEDURE — 73560 X-RAY EXAM OF KNEE 1 OR 2: CPT | Mod: 26,RT

## 2017-12-23 RX ORDER — CEFTRIAXONE 500 MG/1
INJECTION, POWDER, FOR SOLUTION INTRAMUSCULAR; INTRAVENOUS
Qty: 0 | Refills: 0 | Status: DISCONTINUED | OUTPATIENT
Start: 2017-12-23 | End: 2018-01-02

## 2017-12-23 RX ORDER — HYDROMORPHONE HYDROCHLORIDE 2 MG/ML
0.5 INJECTION INTRAMUSCULAR; INTRAVENOUS; SUBCUTANEOUS ONCE
Qty: 0 | Refills: 0 | Status: DISCONTINUED | OUTPATIENT
Start: 2017-12-23 | End: 2017-12-23

## 2017-12-23 RX ORDER — PHENYLEPHRINE HYDROCHLORIDE 10 MG/ML
0.5 INJECTION INTRAVENOUS
Qty: 160 | Refills: 0 | Status: DISCONTINUED | OUTPATIENT
Start: 2017-12-23 | End: 2017-12-24

## 2017-12-23 RX ORDER — ACETAMINOPHEN 500 MG
1000 TABLET ORAL ONCE
Qty: 0 | Refills: 0 | Status: COMPLETED | OUTPATIENT
Start: 2017-12-23 | End: 2017-12-23

## 2017-12-23 RX ORDER — PHENYLEPHRINE HYDROCHLORIDE 10 MG/ML
0.5 INJECTION INTRAVENOUS
Qty: 160 | Refills: 0 | Status: DISCONTINUED | OUTPATIENT
Start: 2017-12-23 | End: 2017-12-23

## 2017-12-23 RX ORDER — HYDROMORPHONE HYDROCHLORIDE 2 MG/ML
0.5 INJECTION INTRAMUSCULAR; INTRAVENOUS; SUBCUTANEOUS
Qty: 0 | Refills: 0 | Status: DISCONTINUED | OUTPATIENT
Start: 2017-12-23 | End: 2017-12-23

## 2017-12-23 RX ORDER — CEFTRIAXONE 500 MG/1
2 INJECTION, POWDER, FOR SOLUTION INTRAMUSCULAR; INTRAVENOUS ONCE
Qty: 0 | Refills: 0 | Status: COMPLETED | OUTPATIENT
Start: 2017-12-23 | End: 2017-12-23

## 2017-12-23 RX ORDER — CEFTRIAXONE 500 MG/1
INJECTION, POWDER, FOR SOLUTION INTRAMUSCULAR; INTRAVENOUS
Qty: 0 | Refills: 0 | Status: DISCONTINUED | OUTPATIENT
Start: 2017-12-23 | End: 2017-12-23

## 2017-12-23 RX ORDER — SODIUM CHLORIDE 9 MG/ML
500 INJECTION INTRAMUSCULAR; INTRAVENOUS; SUBCUTANEOUS
Qty: 0 | Refills: 0 | Status: DISCONTINUED | OUTPATIENT
Start: 2017-12-23 | End: 2017-12-23

## 2017-12-23 RX ORDER — HYDROMORPHONE HYDROCHLORIDE 2 MG/ML
1 INJECTION INTRAMUSCULAR; INTRAVENOUS; SUBCUTANEOUS EVERY 6 HOURS
Qty: 0 | Refills: 0 | Status: DISCONTINUED | OUTPATIENT
Start: 2017-12-23 | End: 2017-12-27

## 2017-12-23 RX ORDER — SODIUM CHLORIDE 9 MG/ML
1000 INJECTION INTRAMUSCULAR; INTRAVENOUS; SUBCUTANEOUS
Qty: 0 | Refills: 0 | Status: DISCONTINUED | OUTPATIENT
Start: 2017-12-23 | End: 2017-12-24

## 2017-12-23 RX ORDER — CEFTRIAXONE 500 MG/1
2 INJECTION, POWDER, FOR SOLUTION INTRAMUSCULAR; INTRAVENOUS EVERY 24 HOURS
Qty: 0 | Refills: 0 | Status: DISCONTINUED | OUTPATIENT
Start: 2017-12-24 | End: 2018-01-02

## 2017-12-23 RX ORDER — ASPIRIN/CALCIUM CARB/MAGNESIUM 324 MG
325 TABLET ORAL EVERY 12 HOURS
Qty: 0 | Refills: 0 | Status: DISCONTINUED | OUTPATIENT
Start: 2017-12-23 | End: 2017-12-29

## 2017-12-23 RX ORDER — SODIUM CHLORIDE 9 MG/ML
500 INJECTION, SOLUTION INTRAVENOUS ONCE
Qty: 0 | Refills: 0 | Status: COMPLETED | OUTPATIENT
Start: 2017-12-23 | End: 2017-12-23

## 2017-12-23 RX ORDER — PHENYLEPHRINE HYDROCHLORIDE 10 MG/ML
0.5 INJECTION INTRAVENOUS
Qty: 80 | Refills: 0 | Status: DISCONTINUED | OUTPATIENT
Start: 2017-12-23 | End: 2017-12-23

## 2017-12-23 RX ADMIN — HYDROMORPHONE HYDROCHLORIDE 0.5 MILLIGRAM(S): 2 INJECTION INTRAMUSCULAR; INTRAVENOUS; SUBCUTANEOUS at 15:54

## 2017-12-23 RX ADMIN — Medication 3 MILLIGRAM(S): at 23:58

## 2017-12-23 RX ADMIN — Medication 400 MILLIGRAM(S): at 21:35

## 2017-12-23 RX ADMIN — HYDROMORPHONE HYDROCHLORIDE 0.5 MILLIGRAM(S): 2 INJECTION INTRAMUSCULAR; INTRAVENOUS; SUBCUTANEOUS at 23:00

## 2017-12-23 RX ADMIN — CEFTRIAXONE 100 GRAM(S): 500 INJECTION, POWDER, FOR SOLUTION INTRAMUSCULAR; INTRAVENOUS at 18:30

## 2017-12-23 RX ADMIN — OXYCODONE AND ACETAMINOPHEN 1 TABLET(S): 5; 325 TABLET ORAL at 14:49

## 2017-12-23 RX ADMIN — PIPERACILLIN AND TAZOBACTAM 25 GRAM(S): 4; .5 INJECTION, POWDER, LYOPHILIZED, FOR SOLUTION INTRAVENOUS at 02:13

## 2017-12-23 RX ADMIN — Medication 1000 MILLIGRAM(S): at 22:05

## 2017-12-23 RX ADMIN — HYDROMORPHONE HYDROCHLORIDE 1 MILLIGRAM(S): 2 INJECTION INTRAMUSCULAR; INTRAVENOUS; SUBCUTANEOUS at 18:45

## 2017-12-23 RX ADMIN — HYDROMORPHONE HYDROCHLORIDE 1 MILLIGRAM(S): 2 INJECTION INTRAMUSCULAR; INTRAVENOUS; SUBCUTANEOUS at 18:30

## 2017-12-23 RX ADMIN — HYDROMORPHONE HYDROCHLORIDE 0.5 MILLIGRAM(S): 2 INJECTION INTRAMUSCULAR; INTRAVENOUS; SUBCUTANEOUS at 22:42

## 2017-12-23 RX ADMIN — PIPERACILLIN AND TAZOBACTAM 25 GRAM(S): 4; .5 INJECTION, POWDER, LYOPHILIZED, FOR SOLUTION INTRAVENOUS at 23:58

## 2017-12-23 RX ADMIN — PIPERACILLIN AND TAZOBACTAM 25 GRAM(S): 4; .5 INJECTION, POWDER, LYOPHILIZED, FOR SOLUTION INTRAVENOUS at 15:37

## 2017-12-23 RX ADMIN — OXYCODONE AND ACETAMINOPHEN 1 TABLET(S): 5; 325 TABLET ORAL at 15:19

## 2017-12-23 RX ADMIN — SODIUM CHLORIDE 1000 MILLILITER(S): 9 INJECTION, SOLUTION INTRAVENOUS at 22:34

## 2017-12-23 RX ADMIN — Medication 325 MILLIGRAM(S): at 21:52

## 2017-12-23 RX ADMIN — PHENYLEPHRINE HYDROCHLORIDE 12.33 MICROGRAM(S)/KG/MIN: 10 INJECTION INTRAVENOUS at 16:45

## 2017-12-23 RX ADMIN — SODIUM CHLORIDE 50 MILLILITER(S): 9 INJECTION INTRAMUSCULAR; INTRAVENOUS; SUBCUTANEOUS at 01:15

## 2017-12-23 RX ADMIN — HYDROMORPHONE HYDROCHLORIDE 0.5 MILLIGRAM(S): 2 INJECTION INTRAMUSCULAR; INTRAVENOUS; SUBCUTANEOUS at 16:09

## 2017-12-23 RX ADMIN — SODIUM CHLORIDE 125 MILLILITER(S): 9 INJECTION INTRAMUSCULAR; INTRAVENOUS; SUBCUTANEOUS at 15:15

## 2017-12-23 RX ADMIN — BUDESONIDE AND FORMOTEROL FUMARATE DIHYDRATE 2 PUFF(S): 160; 4.5 AEROSOL RESPIRATORY (INHALATION) at 22:43

## 2017-12-23 NOTE — CONSULT NOTE ADULT - ASSESSMENT
77y female with remote b/l TKR, TAVR and PPM 1 yr ago, CAD- stent, Coumadin, root canal ~ 1 month ago, admitted yest with 1 wk of fatigue, and septic R knee (53k WBCs)  Pt taken to OR earlier today- I&D, hardware removal, placement of spacer.    She's currently in SICU, afebrile, on Levo for BP support, alert  Bld Cxs now with Strep sp in all 4 bottles- PCR suggests Alpha Strep by exclusion (not Beta Strep, S pneumoniae, or Enterococcus); could postulate seeding of oral roopa from recent root canal; and with this concern, underlying TAVR and PPM, have to be further concerned abt prospect of endocarditis, even in face of recent unrevealing TTE    Plan:  Can d/c Vanco and Zosyn  Start Ceftriaxone 2 g IV q 24 directed at [facility]  Await final Micro  Pt will need 6 wks via PICC  Check repeat Bld Cxs in AM  Consider ERIKA- r/o valve or lead veg  D/w pt and family at length  D/w SICU Team  > 40 mins spent in direct assessment of the patient, analysis of all pertinent data, discussion, counseling, and coordination of care; benefit, possible adverse effects, and limitations of antibiotics reviewed
History of AS s/p TAVR  Normal LV function  Mild pulmonary HTN  Probable diastolic dysfunction  Morbid obesity, HTN, hyperlipidemia  Stable from the cardiovascular perspective.  From our standpoint, she may proceed with orthopedic procedure to be performed under general anesthesia.  Discussed with Orthopedics team -- can hold antiplatelets (TAVR one year ago) for procedure, can resume immediately with aspirin (as preferred by the Ortho team), with clopidogrel to be added back on as early as feasible.
78 yo F PMH AS s/p TAVR 2016, s/p PPM 2016, bilateral knee OA s/p bilateral TKA (2002), PMR on chronic prednisone, Morbid Obesity who presented to [facility] on 12/21/17 with dyspnea and right knee pain. In the ED febrile to 101.1, tachycardic to 90 but normotensive. WBC 8.76 on presentation. ESR 74 and .3. U/A with 2-5 WBC. RVP negative. CXR Clear and Knee XR without fracture. Underwent tap of the right knee with 53,425 nucleated cells, 110,000 RBC, 87% PMN and no crystals. Started on Vanc/Zosyn. Afebrile after admission. Underwent R TKA hardware removal with placement of antibiotic spacer. Blood cultures positive for Strep not Group A, B or Strep Pneum on PCR.  --Continue Zosyn 3.375 mg IV Q8H  --Repeat 2 sets of blood cultures (can be done with AM labs)  --Recommend TTE  --F/U final speciation of Strep spp.

## 2017-12-23 NOTE — CONSULT NOTE ADULT - SUBJECTIVE AND OBJECTIVE BOX
HPI:  77F with morbid obesity, HTN, HLD, AS s/p TAVR, asthma, GERD, bilateral knee OA s/p bilateral TKA (), polymyalgia on chronic prednisone presents for dyspnea and right knee pain. Patient reports chronic h/o dyspnea at baseline, especially with ambulation, unclear whether acutely worsening with last 2 days. She denies chest pain, leg swelling but admits to sleeping upright in recliner chair nightly. Patient follows with Cardiologist Dr. Roverto Roblero, recently seen in clinic for TTE on  which was relatively unremarkable. She does endorse rapid heart rate with chest pain 3 weeks ago after taking clindamycin prophylaxis for dental procedure (due to B/L TKR and TAVR). She endorses intolerance to amoxicillin in past due to "shakes" but denies throat swelling, wheezing, shortness of breath. Since then patient states it has been "all downhill from there." She endorses chills but no fever, night sweats, nausea, vomiting or abdominal pain. Yesterday, she  experienced acute onset of right knee pain, able to bear only minimal weight on right foot, difficulty with ambulation (ambulates without aides within home but with walker wheels elsewhere). She states right knee "feels more swollen" but does not exactly appear that way.     In ED, patient febrile to 101. Infectious work up performed. Given vancomycin x1, Zosyn x1, 0.9NS x 1L, morphine 4 IV, Toradol 15 IV and Tylenol 1g IV. (22 Dec 2017 05:50)      PAST MEDICAL & SURGICAL HISTORY:  CAD (coronary artery disease): HERBERT to LAD 2016  Aortic stenosis, severe  Uncomplicated asthma, unspecified asthma severity  Polymyalgia  Lumbar disc disease with radiculopathy  Spider veins  Anxiety  Severe aortic stenosis by prior echocardiogram:  and  2016  Dysphagia  Morbid obesity with BMI of 50.0-59.9, adult  Macular degeneration  Hiatal hernia  GERD (gastroesophageal reflux disease)  Sciatica  Osteoarthritis  H/O cardiac catheterization: 16 HERBERT placed on LAD  H/O endoscopy: with dilatation of esophageal stricture   S/P cataract surgery: x2; 3-4yr ago  S/P gastroplasty: 28 yrs ago  S/P cholecystectomy: more than 15 years ago  S/P total knee replacement: left, 15yr ago  S/P total knee replacement: right, 12yr ago  S/P hysterectomy: 24yr ago      Allergies  No Known Allergies        ANTIMICROBIALS:  piperacillin/tazobactam IVPB. 3.375 every 8 hours      OTHER MEDS: MEDICATIONS  (STANDING):  acetaminophen   Tablet 650 every 6 hours PRN  buDESOnide 160 MICROgram(s)/formoterol 4.5 MICROgram(s) Inhaler 2 two times a day  docusate sodium 100 two times a day  lisinopril 10 daily  melatonin 3 at bedtime PRN  oxyCODONE    5 mG/acetaminophen 325 mG 1 every 6 hours PRN  pantoprazole    Tablet 40 before breakfast  simvastatin 20 at bedtime      SOCIAL HISTORY:  [ ] etoh [ ] tobacco [ ] former smoker [ ] IVDU    FAMILY HISTORY:  No pertinent family history in first degree relatives      REVIEW OF SYSTEMS  [  ] ROS unobtainable because:    [  ] All other systems negative except as noted below:	    Constitutional:  [ ] fever [ ] weight loss  Skin:  [ ] rash [ ] phlebitis	  Eyes: [ ] icterus [ ] inflammation	  ENMT: [ ] discharge [ ] thrush [ ] ulcers [ ] exudates  Respiratory: [ ] dyspnea [ ] hemoptysis [ ] cough [ ] sputum	  Cardiovascular:  [ ] chest pain [ ] palpitations [ ] edema	  Gastrointestinal:  [ ] nausea [ ] vomiting [ ] diarrhea [ ] constipation [ ] pain	  Genitourinary:  [ ] dysuria [ ] frequency [ ] hematuria [ ] discharge [ ] flank pain  Musculoskeletal:  [ ] myalgias [ ] arthralgias [ ] arthritis	  Neurological:  [ ] headache [ ] seizures	  Psychiatric:  [ ] anxiety [ ] depression	  Hematology/Lymphatics:  [ ] lymphadenopathy  Endocrine:  [ ] adrenal [ ] thyroid  Allergic/Immunologic:	 [ ] transplant [ ] seasonal    Vital Signs Last 24 Hrs  T(F): 98.6 (17 @ 07:05), Max: 101.1 (17 @ 22:26)    Vital Signs Last 24 Hrs  HR: 67 (17 @ 07:05) (67 - 77)  BP: 117/48 (17 @ 07:05) (102/50 - 141/68)  RR: 18 (17 @ 07:05)  SpO2: 94% (17 @ 07:05) (94% - 99%)  Wt(kg): --    PHYSICAL EXAM:  General: non-toxic  HEAD/EYES: anicteric, PERRL  ENT:  supple  Cardiovascular:   S1, S2  Respiratory:  clear bilaterally  GI:  soft, non-tender, normal bowel sounds  :  no CVA tenderness   Musculoskeletal:  no synovitis  Neurologic:  grossly non-focal  Skin:  no rash  Lymph: no lymphadenopathy  Psychiatric:  appropriate affect  Vascular:  no phlebitis                                8.9    6.12  )-----------( 121      ( 23 Dec 2017 04:30 )             29.0           137  |  101  |  20  ----------------------------<  100<H>  4.3   |  23  |  1.15    Ca    7.9<L>      23 Dec 2017 04:30  Phos  3.7       Mg     1.8         TPro  7.1  /  Alb  3.4  /  TBili  0.2  /  DBili  x   /  AST  30  /  ALT  16  /  AlkPhos  83        Urinalysis Basic - ( 22 Dec 2017 01:41 )    Color: YELLOW / Appearance: CLEAR / S.024 / pH: 6.0  Gluc: NEGATIVE / Ketone: NEGATIVE  / Bili: NEGATIVE / Urobili: NORMAL mg/dL   Blood: SMALL / Protein: 100 mg/dL / Nitrite: NEGATIVE   Leuk Esterase: NEGATIVE / RBC: 10-25 / WBC 2-5   Sq Epi: OCC / Non Sq Epi: x / Bacteria: FEW        MICROBIOLOGY:  KNEE FLUID  17 --  --    WBC^White Blood Cells  QNTY CELLS IN GRAM STAIN: MANY (4+)  NOS^No Organisms Seen      URINE MIDSTREAM  17 --  --  --      BLOOD PERIPHERAL  17 --  --  BLOOD CULTURE PCR              Vancomycin Level, Trough: 23.6 ug/mL (17 @ 04:30)  v            RADIOLOGY: HPI:    76 yo F PMH AS s/p TAVR, bilateral knee OA s/p bilateral TKA (), PMR on chronic prednisone, Morbid Obesity who presented to [facility] on 17 with dyspnea and right knee pain. The patient reports chronic dyspnea at baseline but notes exacerbation of dyspnea within past 48 hours prior to admission. He denies any cough, wheezing, rhinorrhea or sore throat. He notes developing acute right knee pain and swelling within past 24 hours prior to arrival. Notes dental procedure ~3 weeks ago for which she took clindamycin prophylaxis.      In the ED febrile to 101.1, tachycardic to 90 but normotensive. WBC 8.76 on presentation. ESR 74 and .3. U/A with 2-5 WBC. RVP negative. CXR Clear and Knee XR without fracture. Underwent tap of the right knee with 53,425 nucleated cells, 110,000 RBC, 87% PMN and no crystals. Started on Vanc/Zosyn. Afebrile after admission.     PAST MEDICAL & SURGICAL HISTORY:  CAD (coronary artery disease): HERBERT to LAD 2016  Aortic stenosis, severe  Uncomplicated asthma, unspecified asthma severity  Polymyalgia  Lumbar disc disease with radiculopathy  Spider veins  Anxiety  Severe aortic stenosis by prior echocardiogram:  and  2016  Dysphagia  Morbid obesity with BMI of 50.0-59.9, adult  Macular degeneration  Hiatal hernia  GERD (gastroesophageal reflux disease)  Sciatica  Osteoarthritis  H/O cardiac catheterization: 16 HERBERT placed on LAD  H/O endoscopy: with dilatation of esophageal stricture   S/P cataract surgery: x2; 3-4yr ago  S/P gastroplasty: 28 yrs ago  S/P cholecystectomy: more than 15 years ago  S/P total knee replacement: left, 15yr ago  S/P total knee replacement: right, 12yr ago  S/P hysterectomy: 24yr ago      Allergies  No Known Allergies        ANTIMICROBIALS:  piperacillin/tazobactam IVPB. 3.375 every 8 hours      OTHER MEDS: MEDICATIONS  (STANDING):  acetaminophen   Tablet 650 every 6 hours PRN  buDESOnide 160 MICROgram(s)/formoterol 4.5 MICROgram(s) Inhaler 2 two times a day  docusate sodium 100 two times a day  lisinopril 10 daily  melatonin 3 at bedtime PRN  oxyCODONE    5 mG/acetaminophen 325 mG 1 every 6 hours PRN  pantoprazole    Tablet 40 before breakfast  simvastatin 20 at bedtime      SOCIAL HISTORY:  [ ] etoh [ ] tobacco [ ] former smoker [ ] IVDU    FAMILY HISTORY:  No pertinent family history in first degree relatives      REVIEW OF SYSTEMS  [  ] ROS unobtainable because:    [  ] All other systems negative except as noted below:	    Constitutional:  [ ] fever [ ] weight loss  Skin:  [ ] rash [ ] phlebitis	  Eyes: [ ] icterus [ ] inflammation	  ENMT: [ ] discharge [ ] thrush [ ] ulcers [ ] exudates  Respiratory: [ ] dyspnea [ ] hemoptysis [ ] cough [ ] sputum	  Cardiovascular:  [ ] chest pain [ ] palpitations [ ] edema	  Gastrointestinal:  [ ] nausea [ ] vomiting [ ] diarrhea [ ] constipation [ ] pain	  Genitourinary:  [ ] dysuria [ ] frequency [ ] hematuria [ ] discharge [ ] flank pain  Musculoskeletal:  [ ] myalgias [ ] arthralgias [ ] arthritis	  Neurological:  [ ] headache [ ] seizures	  Psychiatric:  [ ] anxiety [ ] depression	  Hematology/Lymphatics:  [ ] lymphadenopathy  Endocrine:  [ ] adrenal [ ] thyroid  Allergic/Immunologic:	 [ ] transplant [ ] seasonal    Vital Signs Last 24 Hrs  T(F): 98.6 (17 @ 07:05), Max: 101.1 (17 @ 22:26)    Vital Signs Last 24 Hrs  HR: 67 (17 @ 07:05) (67 - 77)  BP: 117/48 (17 @ 07:05) (102/50 - 141/68)  RR: 18 (17 @ 07:05)  SpO2: 94% (17 @ 07:05) (94% - 99%)  Wt(kg): --    PHYSICAL EXAM:  General: non-toxic  HEAD/EYES: anicteric, PERRL  ENT:  supple  Cardiovascular:   S1, S2  Respiratory:  clear bilaterally  GI:  soft, non-tender, normal bowel sounds  :  no CVA tenderness   Musculoskeletal:  no synovitis  Neurologic:  grossly non-focal  Skin:  no rash  Lymph: no lymphadenopathy  Psychiatric:  appropriate affect  Vascular:  no phlebitis                                8.9    6.12  )-----------( 121      ( 23 Dec 2017 04:30 )             29.0           137  |  101  |  20  ----------------------------<  100<H>  4.3   |  23  |  1.15    Ca    7.9<L>      23 Dec 2017 04:30  Phos  3.7       Mg     1.8         TPro  7.1  /  Alb  3.4  /  TBili  0.2  /  DBili  x   /  AST  30  /  ALT  16  /  AlkPhos  83        Urinalysis Basic - ( 22 Dec 2017 01:41 )    Color: YELLOW / Appearance: CLEAR / S.024 / pH: 6.0  Gluc: NEGATIVE / Ketone: NEGATIVE  / Bili: NEGATIVE / Urobili: NORMAL mg/dL   Blood: SMALL / Protein: 100 mg/dL / Nitrite: NEGATIVE   Leuk Esterase: NEGATIVE / RBC: 10-25 / WBC 2-5   Sq Epi: OCC / Non Sq Epi: x / Bacteria: FEW        MICROBIOLOGY:  KNEE FLUID  17 --  --    WBC^White Blood Cells  QNTY CELLS IN GRAM STAIN: MANY (4+)  NOS^No Organisms Seen    URINE MIDSTREAM  17 --  --  --    BLOOD PERIPHERAL  17 --  --  BLOOD CULTURE PCR    Vancomycin Level, Trough: 23.6 ug/mL (17 @ 04:30)    RADIOLOGY:    EXAM:  RAD KNEE 3 VIEWS RIGHT    PROCEDURE DATE:  Dec 22 2017   No acute fracture.    EXAM:  RAD CHEST AP PA 1 VIEW    PROCEDURE DATE:  Dec 22 2017   Clear lungs. HPI:    76 yo F PMH AS s/p TAVR , s/p PPM , bilateral knee OA s/p bilateral TKA (), PMR on chronic prednisone, Morbid Obesity who presented to [facility] on 17 with dyspnea and right knee pain. The patient reports chronic dyspnea at baseline but notes exacerbation of dyspnea within past 48 hours prior to admission. He denies any cough, wheezing, rhinorrhea or sore throat. She notes developing acute right knee pain and swelling within past 24 hours prior to arrival. Notes undergoing a root canal ~3 weeks ago for which she took clindamycin prophylaxis.  Post-op she was prescribed amoxicillin but developed tachycardia after the first dose so she discontinued it.    In the ED febrile to 101.1, tachycardic to 90 but normotensive. WBC 8.76 on presentation. ESR 74 and .3. U/A with 2-5 WBC. RVP negative. CXR Clear and Knee XR without fracture. Underwent tap of the right knee with 53,425 nucleated cells, 110,000 RBC, 87% PMN and no crystals. Started on Vanc/Zosyn. Afebrile after admission. Underwent R TKA hardware removal with placement of antibiotic spacer. Notes significant right knee pain post-operatively.     PAST MEDICAL & SURGICAL HISTORY:  CAD (coronary artery disease): HERBERT to LAD 2016  Aortic stenosis, severe  Uncomplicated asthma, unspecified asthma severity  Polymyalgia  Lumbar disc disease with radiculopathy  Spider veins  Anxiety  Severe aortic stenosis by prior echocardiogram:  and  2016  Dysphagia  Morbid obesity with BMI of 50.0-59.9, adult  Macular degeneration  Hiatal hernia  GERD (gastroesophageal reflux disease)  Sciatica  Osteoarthritis  H/O cardiac catheterization: 16 HERBERT placed on LAD  H/O endoscopy: with dilatation of esophageal stricture   S/P cataract surgery: x2; 3-4yr ago  S/P gastroplasty: 28 yrs ago  S/P cholecystectomy: more than 15 years ago  S/P total knee replacement: left, 15yr ago  S/P total knee replacement: right, 12yr ago  S/P hysterectomy: 24yr ago      Allergies  No Known Allergies        ANTIMICROBIALS:  piperacillin/tazobactam IVPB. 3.375 every 8 hours      OTHER MEDS: MEDICATIONS  (STANDING):  acetaminophen   Tablet 650 every 6 hours PRN  buDESOnide 160 MICROgram(s)/formoterol 4.5 MICROgram(s) Inhaler 2 two times a day  docusate sodium 100 two times a day  lisinopril 10 daily  melatonin 3 at bedtime PRN  oxyCODONE    5 mG/acetaminophen 325 mG 1 every 6 hours PRN  pantoprazole    Tablet 40 before breakfast  simvastatin 20 at bedtime      SOCIAL HISTORY: no smoking or etoh. No recreational drug use. no recent travel. no pets.     FAMILY HISTORY:  No pertinent family history in first degree relatives      REVIEW OF SYSTEMS  [  ] ROS unobtainable because:    [ x ] All other systems negative except as noted below:	    Constitutional:  [x ] fever [ ] weight loss  Skin:  [ ] rash [ ] phlebitis	  Eyes: [ ] icterus [ ] inflammation	  ENMT: [ ] discharge [ ] thrush [ ] ulcers [ ] exudates  Respiratory: [x ] dyspnea [ ] hemoptysis [ ] cough [ ] sputum	  Cardiovascular:  [ ] chest pain [ ] palpitations [ ] edema	  Gastrointestinal:  [ ] nausea [ ] vomiting [ ] diarrhea [ ] constipation [ ] pain	  Genitourinary:  [ ] dysuria [ ] frequency [ ] hematuria [ ] discharge [ ] flank pain  Musculoskeletal:  [ ] myalgias [ ] arthralgias [ ] arthritis +right knee pain  Neurological:  [ ] headache [ ] seizures	  Psychiatric:  [ ] anxiety [ ] depression	  Hematology/Lymphatics:  [ ] lymphadenopathy  Endocrine:  [ ] adrenal [ ] thyroid  Allergic/Immunologic:	 [ ] transplant [ ] seasonal    Vital Signs Last 24 Hrs  T(F): 98.6 (17 @ 07:05), Max: 101.1 (17 @ 22:26)    Vital Signs Last 24 Hrs  HR: 67 (17 @ 07:05) (67 - 77)  BP: 117/48 (17 @ 07:05) (102/50 - 141/68)  RR: 18 (17 @ 07:05)  SpO2: 94% (17 @ 07:05) (94% - 99%)  Wt(kg): --    PHYSICAL EXAMINATION:  General: Alert and Awake, NAD  HEENT: PERRL, EOMI, No subconjunctival hemorrhages, Oropharynx Clear, MMM  Neck: Supple  Cardiac: RRR, 2/6 LAI best appreciated at RUSB, No R/G  Chest: L PPM (No surrounding erythema, drainage or tenderness to palpation)  Resp: CTAB, No Wh/Rh/Ra  Abdomen: NBS, NT/ND, No HSM, No rigidity or guarding  MSK: RLE with dressing on post-op No LE edema. No stigmata of IE. No evidence of phlebitis. No evidence of synovitis.  Back: No CVA Tenderness  Skin: No rashes or lesions. Skin is warm and dry to the touch.   Neuro: AAO3X. CN 2-12 Grossly intact. Moves all four extremities spontaneously.  Psych: Calm, Pleasant, Cooperative             8.9    6.12  )-----------( 121      ( 23 Dec 2017 04:30 )             29.0           137  |  101  |  20  ----------------------------<  100<H>  4.3   |  23  |  1.15    Ca    7.9<L>      23 Dec 2017 04:30  Phos  3.7       Mg     1.8         TPro  7.1  /  Alb  3.4  /  TBili  0.2  /  DBili  x   /  AST  30  /  ALT  16  /  AlkPhos  83  -      Urinalysis Basic - ( 22 Dec 2017 01:41 )    Color: YELLOW / Appearance: CLEAR / S.024 / pH: 6.0  Gluc: NEGATIVE / Ketone: NEGATIVE  / Bili: NEGATIVE / Urobili: NORMAL mg/dL   Blood: SMALL / Protein: 100 mg/dL / Nitrite: NEGATIVE   Leuk Esterase: NEGATIVE / RBC: 10-25 / WBC 2-5   Sq Epi: OCC / Non Sq Epi: x / Bacteria: FEW        MICROBIOLOGY:  KNEE FLUID  17 --  --    WBC^White Blood Cells  QNTY CELLS IN GRAM STAIN: MANY (4+)  NOS^No Organisms Seen    URINE MIDSTREAM  17 --  --  --    BLOOD PERIPHERAL  17 --  --  BLOOD CULTURE PCR    Vancomycin Level, Trough: 23.6 ug/mL (17 @ 04:30)    RADIOLOGY:    EXAM:  RAD KNEE 3 VIEWS RIGHT    PROCEDURE DATE:  Dec 22 2017   No acute fracture.    EXAM:  RAD CHEST AP PA 1 VIEW    PROCEDURE DATE:  Dec 22 2017   Clear lungs. HPI:    76 yo F PMH AS s/p TAVR , s/p PPM , bilateral knee OA s/p bilateral TKA (), PMR on chronic prednisone, Morbid Obesity who presented to [facility] on 17 with dyspnea and right knee pain. The patient reports chronic dyspnea at baseline but notes exacerbation of dyspnea within past 48 hours prior to admission. He denies any cough, wheezing, rhinorrhea or sore throat. She notes developing acute right knee pain and swelling within past 24 hours prior to arrival. Notes undergoing a root canal ~3 weeks ago for which she took clindamycin prophylaxis.  Post-op she was prescribed amoxicillin but developed tachycardia after the first dose so she discontinued it.    In the ED febrile to 101.1, tachycardic to 90 but normotensive. WBC 8.76 on presentation. ESR 74 and .3. U/A with 2-5 WBC. RVP negative. CXR Clear and Knee XR without fracture. Underwent tap of the right knee with 53,425 nucleated cells, 110,000 RBC, 87% PMN and no crystals. Started on Vanc/Zosyn. Afebrile after admission. Underwent R TKA hardware removal with placement of antibiotic spacer. Notes significant right knee pain post-operatively.     PAST MEDICAL & SURGICAL HISTORY:  CAD (coronary artery disease): HERBERT to LAD 2016  Aortic stenosis, severe  Uncomplicated asthma, unspecified asthma severity  Polymyalgia  Lumbar disc disease with radiculopathy  Spider veins  Anxiety  Severe aortic stenosis by prior echocardiogram:  and  2016  Dysphagia  Morbid obesity with BMI of 50.0-59.9, adult  Macular degeneration  Hiatal hernia  GERD (gastroesophageal reflux disease)  Sciatica  Osteoarthritis  H/O cardiac catheterization: 16 HERBERT placed on LAD  H/O endoscopy: with dilatation of esophageal stricture   S/P cataract surgery: x2; 3-4yr ago  S/P gastroplasty: 28 yrs ago  S/P cholecystectomy: more than 15 years ago  S/P total knee replacement: left, 15yr ago  S/P total knee replacement: right, 12yr ago  S/P hysterectomy: 24yr ago      Allergies  No Known Allergies        ANTIMICROBIALS:  piperacillin/tazobactam IVPB. 3.375 every 8 hours      OTHER MEDS: MEDICATIONS  (STANDING):  acetaminophen   Tablet 650 every 6 hours PRN  buDESOnide 160 MICROgram(s)/formoterol 4.5 MICROgram(s) Inhaler 2 two times a day  docusate sodium 100 two times a day  lisinopril 10 daily  melatonin 3 at bedtime PRN  oxyCODONE    5 mG/acetaminophen 325 mG 1 every 6 hours PRN  pantoprazole    Tablet 40 before breakfast  simvastatin 20 at bedtime      SOCIAL HISTORY: no smoking or etoh. No recreational drug use. no recent travel. no pets.     FAMILY HISTORY:  No pertinent family history in first degree relatives      REVIEW OF SYSTEMS  [  ] ROS unobtainable because:    [ x ] All other systems negative except as noted below:	    Constitutional:  [x ] fever [ ] weight loss  Skin:  [ ] rash [ ] phlebitis	  Eyes: [ ] icterus [ ] inflammation	  ENMT: [ ] discharge [ ] thrush [ ] ulcers [ ] exudates  Respiratory: [x ] dyspnea [ ] hemoptysis [ ] cough [ ] sputum	  Cardiovascular:  [ ] chest pain [ ] palpitations [ ] edema	  Gastrointestinal:  [ ] nausea [ ] vomiting [ ] diarrhea [ ] constipation [ ] pain	  Genitourinary:  [ ] dysuria [ ] frequency [ ] hematuria [ ] discharge [ ] flank pain  Musculoskeletal:  [ ] myalgias [ ] arthralgias [ ] arthritis +right knee pain  Neurological:  [ ] headache [ ] seizures	  Psychiatric:  [ ] anxiety [ ] depression	  Hematology/Lymphatics:  [ ] lymphadenopathy  Endocrine:  [ ] adrenal [ ] thyroid  Allergic/Immunologic:	 [ ] transplant [ ] seasonal    Vital Signs Last 24 Hrs  T(F): 98.6 (17 @ 07:05), Max: 101.1 (17 @ 22:26)    Vital Signs Last 24 Hrs  HR: 67 (17 @ 07:05) (67 - 77)  BP: 117/48 (17 @ 07:05) (102/50 - 141/68)  RR: 18 (17 @ 07:05)  SpO2: 94% (17 @ 07:05) (94% - 99%)  Wt(kg): --    PHYSICAL EXAMINATION:  General: Alert and Awake, NAD  HEENT: PERRL, EOMI, No subconjunctival hemorrhages, Oropharynx Clear, MMM  Neck: Supple  Cardiac: RRR, 2/6 LAI best appreciated at RUSB, No R/G  Chest: L PPM (No surrounding erythema, drainage or tenderness to palpation)  Resp: CTAB, No Wh/Rh/Ra  Abdomen: NBS, NT/ND, No HSM, No rigidity or guarding  MSK: RLE with dressing on post-op No LE edema. No stigmata of IE. No evidence of phlebitis. No evidence of synovitis.  : +[place]  Skin: No rashes or lesions. Skin is warm and dry to the touch.   Neuro: AAO3X. CN 2-12 Grossly intact. Moves all four extremities spontaneously.  Psych: Calm, Pleasant, Cooperative               8.9    6.12  )-----------( 121      ( 23 Dec 2017 04:30 )             29.0           137  |  101  |  20  ----------------------------<  100<H>  4.3   |  23  |  1.15    Ca    7.9<L>      23 Dec 2017 04:30  Phos  3.7       Mg     1.8         TPro  7.1  /  Alb  3.4  /  TBili  0.2  /  DBili  x   /  AST  30  /  ALT  16  /  AlkPhos  83  12-21      Urinalysis Basic - ( 22 Dec 2017 01:41 )    Color: YELLOW / Appearance: CLEAR / S.024 / pH: 6.0  Gluc: NEGATIVE / Ketone: NEGATIVE  / Bili: NEGATIVE / Urobili: NORMAL mg/dL   Blood: SMALL / Protein: 100 mg/dL / Nitrite: NEGATIVE   Leuk Esterase: NEGATIVE / RBC: 10-25 / WBC 2-5   Sq Epi: OCC / Non Sq Epi: x / Bacteria: FEW        MICROBIOLOGY:  KNEE FLUID  17 --  --    WBC^White Blood Cells  QNTY CELLS IN GRAM STAIN: MANY (4+)  NOS^No Organisms Seen    URINE MIDSTREAM  17 --  --  --    BLOOD PERIPHERAL  17 --  --  BLOOD CULTURE PCR    Vancomycin Level, Trough: 23.6 ug/mL (17 @ 04:30)    RADIOLOGY:    EXAM:  RAD KNEE 3 VIEWS RIGHT    PROCEDURE DATE:  Dec 22 2017   No acute fracture.    EXAM:  RAD CHEST AP PA 1 VIEW    PROCEDURE DATE:  Dec 22 2017   Clear lungs. HPI:    76 yo F PMH AS s/p TAVR , s/p PPM , bilateral knee OA s/p bilateral TKA (), PMR on chronic prednisone, Morbid Obesity who presented to [facility] on 17 with dyspnea and right knee pain. The patient reports chronic dyspnea at baseline but notes exacerbation of dyspnea within past 48 hours prior to admission. He denies any cough, wheezing, rhinorrhea or sore throat. She notes developing acute right knee pain and swelling within past 24 hours prior to arrival. Notes undergoing a root canal ~3 weeks ago for which she took clindamycin prophylaxis.  Post-op she was prescribed amoxicillin but developed tachycardia after the first dose so she discontinued it.    In the ED febrile to 101.1, tachycardic to 90 but normotensive. WBC 8.76 on presentation. ESR 74 and .3. U/A with 2-5 WBC. RVP negative. CXR Clear and Knee XR without fracture. Underwent tap of the right knee with 53,425 nucleated cells, 110,000 RBC, 87% PMN and no crystals. Started on Vanc/Zosyn. Afebrile after admission. Underwent R TKA hardware removal with placement of antibiotic spacer. Notes significant right knee pain post-operatively.     PAST MEDICAL & SURGICAL HISTORY:  CAD (coronary artery disease): HERBERT to LAD 2016  Aortic stenosis, severe  Uncomplicated asthma, unspecified asthma severity  Polymyalgia  Lumbar disc disease with radiculopathy  Spider veins  Anxiety  Severe aortic stenosis by prior echocardiogram:  and  2016  Dysphagia  Morbid obesity with BMI of 50.0-59.9, adult  Macular degeneration  Hiatal hernia  GERD (gastroesophageal reflux disease)  Sciatica  Osteoarthritis  H/O cardiac catheterization: 16 HERBERT placed on LAD  H/O endoscopy: with dilatation of esophageal stricture   S/P cataract surgery: x2; 3-4yr ago  S/P gastroplasty: 28 yrs ago  S/P cholecystectomy: more than 15 years ago  S/P total knee replacement: left, 15yr ago  S/P total knee replacement: right, 12yr ago  S/P hysterectomy: 24yr ago      Allergies  No Known Allergies        ANTIMICROBIALS:  piperacillin/tazobactam IVPB. 3.375 every 8 hours      OTHER MEDS: MEDICATIONS  (STANDING):  acetaminophen   Tablet 650 every 6 hours PRN  buDESOnide 160 MICROgram(s)/formoterol 4.5 MICROgram(s) Inhaler 2 two times a day  docusate sodium 100 two times a day  lisinopril 10 daily  melatonin 3 at bedtime PRN  oxyCODONE    5 mG/acetaminophen 325 mG 1 every 6 hours PRN  pantoprazole    Tablet 40 before breakfast  simvastatin 20 at bedtime      SOCIAL HISTORY: no smoking or etoh. No recreational drug use. no recent travel. no pets.     FAMILY HISTORY:  No pertinent family history in first degree relatives      REVIEW OF SYSTEMS  [  ] ROS unobtainable because:    [ x ] All other systems negative except as noted below:	    Constitutional:  [x ] fever [ ] weight loss  Skin:  [ ] rash [ ] phlebitis	  Eyes: [ ] icterus [ ] inflammation	  ENMT: [ ] discharge [ ] thrush [ ] ulcers [ ] exudates  Respiratory: [x ] dyspnea [ ] hemoptysis [ ] cough [ ] sputum	  Cardiovascular:  [ ] chest pain [ ] palpitations [ ] edema	  Gastrointestinal:  [ ] nausea [ ] vomiting [ ] diarrhea [ ] constipation [ ] pain	  Genitourinary:  [ ] dysuria [ ] frequency [ ] hematuria [ ] discharge [ ] flank pain  Musculoskeletal:  [ ] myalgias [ ] arthralgias [ ] arthritis +right knee pain  Neurological:  [ ] headache [ ] seizures	  Psychiatric:  [ ] anxiety [ ] depression	  Hematology/Lymphatics:  [ ] lymphadenopathy  Endocrine:  [ ] adrenal [ ] thyroid  Allergic/Immunologic:	 [ ] transplant [ ] seasonal    Vital Signs Last 24 Hrs  T(F): 98.6 (17 @ 07:05), Max: 101.1 (17 @ 22:26)    Vital Signs Last 24 Hrs  HR: 67 (17 @ 07:05) (67 - 77)  BP: 117/48 (17 @ 07:05) (102/50 - 141/68)  RR: 18 (17 @ 07:05)  SpO2: 94% (17 @ 07:05) (94% - 99%)  Wt(kg): --    PHYSICAL EXAMINATION:  General: Alert and Awake, NAD  HEENT: PERRL, EOMI, No subconjunctival hemorrhages, Oropharynx Clear, MMM  Neck: Supple  Cardiac: RRR, 2/6 LAI best appreciated at RUSB, No R/G  Chest: L PPM (No surrounding erythema, drainage or tenderness to palpation)  Resp: CTAB, No Wh/Rh/Ra  Abdomen: NBS, NT/ND, No HSM, No rigidity or guarding  MSK: RLE with dressing on post-op, L Knee s/p arthroplasty (No surrounding erythema, drainage or tenderness to palpation). No LE edema. No stigmata of IE.  : +Navarrete  Skin: No rashes or lesions. Skin is warm and dry to the touch.   Neuro: AAO3X. CN 2-12 Grossly intact. Moves all four extremities spontaneously.  Psych: Calm, Pleasant, Cooperative               8.9    6.12  )-----------( 121      ( 23 Dec 2017 04:30 )             29.0           137  |  101  |  20  ----------------------------<  100<H>  4.3   |  23  |  1.15    Ca    7.9<L>      23 Dec 2017 04:30  Phos  3.7       Mg     1.8         TPro  7.1  /  Alb  3.4  /  TBili  0.2  /  DBili  x   /  AST  30  /  ALT  16  /  AlkPhos  83  12-21      Urinalysis Basic - ( 22 Dec 2017 01:41 )    Color: YELLOW / Appearance: CLEAR / S.024 / pH: 6.0  Gluc: NEGATIVE / Ketone: NEGATIVE  / Bili: NEGATIVE / Urobili: NORMAL mg/dL   Blood: SMALL / Protein: 100 mg/dL / Nitrite: NEGATIVE   Leuk Esterase: NEGATIVE / RBC: 10-25 / WBC 2-5   Sq Epi: OCC / Non Sq Epi: x / Bacteria: FEW        MICROBIOLOGY:  KNEE FLUID  17 --  --    WBC^White Blood Cells  QNTY CELLS IN GRAM STAIN: MANY (4+)  NOS^No Organisms Seen    URINE MIDSTREAM  17 --  --  --    BLOOD PERIPHERAL  17 --  --  BLOOD CULTURE PCR    Vancomycin Level, Trough: 23.6 ug/mL (17 @ 04:30)    RADIOLOGY:    EXAM:  RAD KNEE 3 VIEWS RIGHT    PROCEDURE DATE:  Dec 22 2017   No acute fracture.    EXAM:  RAD CHEST AP PA 1 VIEW    PROCEDURE DATE:  Dec 22 2017   Clear lungs.

## 2017-12-23 NOTE — BRIEF OPERATIVE NOTE - OPERATION/FINDINGS
Dx: R Knee Periprosthetic Joint Infection  Sx: R Knee Explant, I&D, Stage 1 ABx Cement Spacer  Findings: None Dx: R Knee Periprosthetic Joint Infection  Sx: R Knee Explant, I&D, Stage 1 ABx Cement Spacer  Findings: infected TKA

## 2017-12-23 NOTE — PROGRESS NOTE ADULT - SUBJECTIVE AND OBJECTIVE BOX
INTERVAL/OVERNIGHT EVENTS:     76 y/o female w/ PMH of morbid obesity, HTN, HLD, aortic stenosis s/p TAVR, asthma, GERD, bilateral knee OA s/p bilateral TKA, admitted for worsening dyspnea x2 days and r. knee pain x1 day. No overnight events. Pt c/o r knee pain, otherwise has no complaints today, although she is anxious about surgery.     MEDICATIONS  (STANDING):  buDESOnide 160 MICROgram(s)/formoterol 4.5 MICROgram(s) Inhaler 2 Puff(s) Inhalation two times a day  docusate sodium 100 milliGRAM(s) Oral two times a day  lisinopril 10 milliGRAM(s) Oral daily  pantoprazole    Tablet 40 milliGRAM(s) Oral before breakfast  piperacillin/tazobactam IVPB. 3.375 Gram(s) IV Intermittent every 8 hours  simvastatin 20 milliGRAM(s) Oral at bedtime  sodium chloride 0.9%. 500 milliLiter(s) (50 mL/Hr) IV Continuous <Continuous>    MEDICATIONS  (PRN):  acetaminophen   Tablet 650 milliGRAM(s) Oral every 6 hours PRN For Temp greater than 38 C (100.4 F)  melatonin 3 milliGRAM(s) Oral at bedtime PRN Insomnia  nystatin Powder 1 Application(s) Topical two times a day PRN rash  oxyCODONE    5 mG/acetaminophen 325 mG 1 Tablet(s) Oral every 6 hours PRN Severe Pain (7 - 10)    Allergies    No Known Allergies    Intolerances    IV Contrast (Flushing (Skin); Nausea)    All other systems reviewed and negative [x]     VITAL SIGNS AND PHYSICAL EXAM:  Vital Signs Last 24 Hrs  T(C): 37 (23 Dec 2017 07:05), Max: 37.6 (22 Dec 2017 21:07)  T(F): 98.6 (23 Dec 2017 07:05), Max: 99.6 (22 Dec 2017 21:07)  HR: 67 (23 Dec 2017 07:05) (67 - 77)  BP: 117/48 (23 Dec 2017 07:05) (102/50 - 141/68)  BP(mean): --  RR: 18 (23 Dec 2017 07:05) (18 - 19)  SpO2: 94% (23 Dec 2017 07:05) (94% - 99%)  I&O's Summary    22 Dec 2017 07:01  -  23 Dec 2017 07:00  --------------------------------------------------------  IN: 0 mL / OUT: 700 mL / NET: -700 mL      Gen: No acute distress, morbidly obese elderly female.  HEENT: Normocephalic, atraumatic, extraocular movements intact, conjunctiva clear without ictuers  CV: Regular rate and rhythm, no murmurs, rubs, or gallops  Resp: Non-labored, clear to auscultation bilaterally  Abd: soft, non-tender, non-distended, obese  Skin: no rash, open wounds  Ext: No apparent LE edema, 2+ pedal pulses b/l, R knee tenderness and warmth on palpation  Neuro: grossly normal     INTERVAL LAB RESULTS:                        8.9    6.12  )-----------( 121      ( 23 Dec 2017 04:30 )             29.0                         10.2   8.76  )-----------( 134      ( 21 Dec 2017 22:40 )             32.4                               137    |  101    |  20                  Calcium: 7.9   / iCa: x      ( @ 04:30)    ----------------------------<  100       Magnesium: 1.8                              4.3     |  23     |  1.15             Phosphorous: 3.7        Urinalysis Basic - ( 22 Dec 2017 01:41 )    Color: YELLOW / Appearance: CLEAR / S.024 / pH: 6.0  Gluc: NEGATIVE / Ketone: NEGATIVE  / Bili: NEGATIVE / Urobili: NORMAL mg/dL   Blood: SMALL / Protein: 100 mg/dL / Nitrite: NEGATIVE   Leuk Esterase: NEGATIVE / RBC: 10-25 / WBC 2-5   Sq Epi: OCC / Non Sq Epi: x / Bacteria: FEW    Blood culture  -  Streptococcus sp. (Not Grp A, B or S pneumoniae): + DETECT EDWARD (12.22.17 @ 00:20)        INTERVAL IMAGING STUDIES:

## 2017-12-23 NOTE — PROGRESS NOTE ADULT - ASSESSMENT
77F with chronic dyspnea, aortic stenosis s/p TAVR, bilateral knee OA s/p b/l TKA, with septic arthritis and strep bacteremia. Pt scheduled with ortho for right knee abx spacer placement. Will likely need ERIKA to evaluate for endocarditis. Infectious disease consulted, recs appreciated.

## 2017-12-23 NOTE — CONSULT NOTE ADULT - SUBJECTIVE AND OBJECTIVE BOX
HPI:   Patient is a 77y female with remote b/l TKR, TAVR and PPM 1 yr ago, CAD- stent, Coumadin, root canal ~ 1 month ago, who was admitted yest with at least 1 wk of fatigue, SOB, and progressive R knee pain and swelling.  She denies fever or chills.  She was seen by MD , TTE no new findings.  She's been afebrile since arrival, but with R knee findings c/w septic joint (53k WBCs), Bld Cxs sent, and pt taken to OR earlier today- I&D, hardware removal, placement of spacer.  She's currently in SICU, alert, on Levo for BP support.  Family at bedside.    REVIEW OF SYSTEMS:  All other review of systems negative (Comprehensive ROS)    PAST MEDICAL & SURGICAL HISTORY:  CAD (coronary artery disease): HERBERT to LAD 2016  Aortic stenosis, severe  Uncomplicated asthma, unspecified asthma severity  Polymyalgia  Lumbar disc disease with radiculopathy  Spider veins  Anxiety  Severe aortic stenosis by prior echocardiogram:  and  2016  Dysphagia  Morbid obesity with BMI of 50.0-59.9, adult  Macular degeneration  Hiatal hernia  GERD (gastroesophageal reflux disease)  Sciatica  Osteoarthritis  H/O cardiac catheterization: 16 HERBERT placed on LAD  H/O endoscopy: with dilatation of esophageal stricture   S/P cataract surgery: x2; 3-4yr ago  S/P gastroplasty: 28 yrs ago  S/P cholecystectomy: more than 15 years ago  S/P total knee replacement: left, 15yr ago  S/P total knee replacement: right, 12yr ago  S/P hysterectomy: 24yr ago      Allergies  No Known Allergies    Intolerances  IV Contrast (Flushing (Skin); Nausea)  ?Amox    Antimicrobials Day # 1  cefTRIAXone   IVPB      piperacillin/tazobactam IVPB. 3.375 Gram(s) IV Intermittent every 8 hours    Other Medications:  acetaminophen   Tablet 650 milliGRAM(s) Oral every 6 hours PRN  aspirin 325 milliGRAM(s) Oral every 12 hours  buDESOnide 160 MICROgram(s)/formoterol 4.5 MICROgram(s) Inhaler 2 Puff(s) Inhalation two times a day  docusate sodium 100 milliGRAM(s) Oral two times a day  HYDROmorphone  Injectable 0.5 milliGRAM(s) IV Push every 3 hours PRN  lisinopril 10 milliGRAM(s) Oral daily  melatonin 3 milliGRAM(s) Oral at bedtime PRN  nystatin Powder 1 Application(s) Topical two times a day PRN  pantoprazole    Tablet 40 milliGRAM(s) Oral before breakfast  phenylephrine    Infusion 0.5 MICROgram(s)/kG/Min IV Continuous <Continuous>  simvastatin 20 milliGRAM(s) Oral at bedtime  sodium chloride 0.9%. 1000 milliLiter(s) IV Continuous <Continuous>      FAMILY HISTORY:  No pertinent family history in first degree relatives      SOCIAL HISTORY:  Smoking: no    ETOH: no         T(F): 97.5 (17 @ 14:15), Max: 99.6 (17 @ 21:07)  HR: 96 (17 @ 17:00)  BP: 95/59 (17 @ 17:00)  RR: 17 (17 @ 17:00)  SpO2: 100% (17 @ 17:00)  Wt(kg): --    PHYSICAL EXAM:  General: alert, no acute distress  Eyes:  anicteric, no conjunctival injection, no discharge  Oropharynx: no lesions or injection 	  Neck: supple, without adenopathy  Lungs: clear to auscultation  Heart: regular rate and rhythm; soft systolic murmur  Abdomen: soft, nondistended, nontender, without mass or organomegaly  Skin: no lesions  Extremities: R knee dressings intact  Neurologic: alert, oriented, moves all extremities    LAB RESULTS:                        8.3    8.72  )-----------( 100      ( 23 Dec 2017 14:30 )             24.9     -    140  |  108<H>  |  16  ----------------------------<  141<H>  4.2   |  22  |  0.85    Ca    7.2<L>      23 Dec 2017 14:30  Phos  4.1     12-  Mg     1.9         TPro  7.1  /  Alb  3.4  /  TBili  0.2  /  DBili  x   /  AST  30  /  ALT  16  /  AlkPhos  83  12-21    LIVER FUNCTIONS - ( 21 Dec 2017 22:40 )  Alb: 3.4 g/dL / Pro: 7.1 g/dL / ALK PHOS: 83 u/L / ALT: 16 u/L / AST: 30 u/L / GGT: x           Urinalysis Basic - ( 22 Dec 2017 01:41 )    Color: YELLOW / Appearance: CLEAR / S.024 / pH: 6.0  Gluc: NEGATIVE / Ketone: NEGATIVE  / Bili: NEGATIVE / Urobili: NORMAL mg/dL   Blood: SMALL / Protein: 100 mg/dL / Nitrite: NEGATIVE   Leuk Esterase: NEGATIVE / RBC: 10-25 / WBC 2-5   Sq Epi: OCC / Non Sq Epi: x / Bacteria: FEW      MICROBIOLOGY:  RECENT CULTURES:   @ 11:48 KNEE FLUID  WBC^White Blood Cells  QNTY CELLS IN GRAM STAIN: MANY (4+)  NOS^No Organisms Seen    Culture - Blood (17 @ 00:20)    Culture - Blood:     Streptococcus sp. (Not Grp A, B or S pneumoniae): + DETECT EDWARD    Specimen Source: BLOOD VENOUS    Organism: BLOOD CULTURE PCR    Gram Stain Blood:   ***** CRITICAL RESULT *****  GPCPR^Gram Pos Cocci in Pairs  AFTER: 21 HOURS INCUBATION  BOTTLE: AEROBIC   ANAEROBIC BOTTLES    Organism Identification: BLOOD CULTURE PCR    Method Type: PCR    Culture - Blood (17 @ 00:20)    Culture - Blood:   NO ORGANISMS ISOLATED  NO ORGANISMS ISOLATED AT 24 HOURS    Specimen Source: BLOOD PERIPHERAL    Gram Stain Blood:   ***** CRITICAL RESULT *****  PERSON CALLED / READ-BACK: GI CHAPA.RN/Y  DATE / TIME CALLED: 17 0044  CALLED BY: NARINDER DE  GPCPR^Gram Pos Cocci in Pairs  AFTER: 22 HOURS INCUBATION  BOTTLE: AEROBIC   ANAEROBIC BOTTLES      RADIOLOGY REVIEWED:  Xray Chest 1 View AP/PA (17 @ 00:52) >  Clear lungs.

## 2017-12-23 NOTE — PROGRESS NOTE ADULT - PROBLEM SELECTOR PLAN 1
-s/p arthrocentesis  -Continue zosyn q8h, vanco q12  -Vanco trough post OR procedure  -tylenol PRN fevers  -percocet q6h PRN pain  -f/u blood cultures – should be for 14 days since she recieved antibiotics before tap

## 2017-12-23 NOTE — BRIEF OPERATIVE NOTE - PROCEDURE
<<-----Click on this checkbox to enter Procedure Removal of hardware from knee with irrigation, debridement and insertion of antibiotic beads or antibiotic spacer  12/23/2017    Active  ZEENAT

## 2017-12-23 NOTE — PROGRESS NOTE ADULT - ASSESSMENT
History of AS s/p TAVR  Normal LV function  Mild pulmonary HTN  Probable diastolic dysfunction  Morbid obesity, HTN, hyperlipidemia  Stable from the cardiovascular perspective.  From our standpoint, she may proceed with orthopedic procedure to be performed under general anesthesia.  Discussed with Orthopedics team -- can hold antiplatelets (TAVR one year ago) for procedure, can resume immediately with aspirin (as preferred by the Ortho team), with clopidogrel to be added back on as early as feasible.

## 2017-12-23 NOTE — CONSULT NOTE ADULT - SUBJECTIVE AND OBJECTIVE BOX
SICU Consultation Note  =====================================================  HPI: 77y female s/p BL total knee replacement presented with infected right knee replacement. Blood cultures positive for strep sp. Underwent explantation of R knee hardware with washout and spacer placement. Patient had significant blood loss and required massive resuscitation. Patient extubated at conclusion of procedure. SICU consulted for continued post-operative resuscitation. Patient maintained on low dose phenylephrine gtt intra-operatively.    Patient has history of CAD w/ HEBRERT placed ~1 year ago. Plavix planned to be resumed on POD 3.    Surgery Information  Case Duration: 	EBL: 1.5L 	IV Fluids: 5L NS, 500 albumin 	Blood Products: 6U pRBCs, 2 FFP, 1 Plt     HISTORY  77y Female  HPI:  77F with morbid obesity, HTN, HLD, AS s/p TAVR, asthma, GERD, bilateral knee OA s/p bilateral TKA (), polymyalgia on chronic prednisone presents for dyspnea and right knee pain. Patient reports chronic h/o dyspnea at baseline, especially with ambulation, unclear whether acutely worsening with last 2 days. She denies chest pain, leg swelling but admits to sleeping upright in recliner chair nightly. Patient follows with Cardiologist Dr. Roverto Roblero, recently seen in clinic for TTE on  which was relatively unremarkable. She does endorse rapid heart rate with chest pain 3 weeks ago after taking clindamycin prophylaxis for dental procedure (due to B/L TKR and TAVR). She endorses intolerance to amoxicillin in past due to "shakes" but denies throat swelling, wheezing, shortness of breath. Since then patient states it has been "all downhill from there." She endorses chills but no fever, night sweats, nausea, vomiting or abdominal pain. Yesterday, she  experienced acute onset of right knee pain, able to bear only minimal weight on right foot, difficulty with ambulation (ambulates without aides within home but with walker wheels elsewhere). She states right knee "feels more swollen" but does not exactly appear that way.     In ED, patient febrile to 101. Infectious work up performed. Given vancomycin x1, Zosyn x1, 0.9NS x 1L, morphine 4 IV, Toradol 15 IV and Tylenol 1g IV. (22 Dec 2017 05:50)    Allergies: IV Contrast (Flushing (Skin); Nausea)    PAST MEDICAL & SURGICAL HISTORY:  CAD (coronary artery disease): HERBERT to LAD 2016  Aortic stenosis, severe  Uncomplicated asthma, unspecified asthma severity  Polymyalgia  Lumbar disc disease with radiculopathy  Spider veins  Anxiety  Severe aortic stenosis by prior echocardiogram:  and  2016  Dysphagia  Morbid obesity with BMI of 50.0-59.9, adult  Macular degeneration  Hiatal hernia  GERD (gastroesophageal reflux disease)  Sciatica  Osteoarthritis  H/O cardiac catheterization: 16 HERBERT placed on LAD  H/O endoscopy: with dilatation of esophageal stricture   S/P cataract surgery: x2; 3-4yr ago  S/P gastroplasty: 28 yrs ago  S/P cholecystectomy: more than 15 years ago  S/P total knee replacement: left, 15yr ago  S/P total knee replacement: right, 12yr ago  S/P hysterectomy: 24yr ago    FAMILY HISTORY:  No pertinent family history in first degree relatives    ADVANCE DIRECTIVES: Full Code    REVIEW OF SYSTEMS:  Non-contributory    CURRENT MEDICATIONS:   --------------------------------------------------------------------------------------  Neurologic Medications  acetaminophen   Tablet 650 milliGRAM(s) Oral every 6 hours PRN For Temp greater than 38 C (100.4 F)  aspirin 325 milliGRAM(s) Oral every 12 hours  HYDROmorphone  Injectable 0.5 milliGRAM(s) IV Push every 3 hours PRN Severe Pain (7 - 10)  melatonin 3 milliGRAM(s) Oral at bedtime PRN Insomnia    Respiratory Medications  buDESOnide 160 MICROgram(s)/formoterol 4.5 MICROgram(s) Inhaler 2 Puff(s) Inhalation two times a day    Cardiovascular Medications  lisinopril 10 milliGRAM(s) Oral daily  phenylephrine    Infusion 0.5 MICROgram(s)/kG/Min IV Continuous <Continuous>    Gastrointestinal Medications  docusate sodium 100 milliGRAM(s) Oral two times a day  pantoprazole    Tablet 40 milliGRAM(s) Oral before breakfast  sodium chloride 0.9%. 1000 milliLiter(s) IV Continuous <Continuous>    Genitourinary Medications    Hematologic/Oncologic Medications    Antimicrobial/Immunologic Medications  piperacillin/tazobactam IVPB. 3.375 Gram(s) IV Intermittent every 8 hours    Endocrine/Metabolic Medications  simvastatin 20 milliGRAM(s) Oral at bedtime    Topical/Other Medications  nystatin Powder 1 Application(s) Topical two times a day PRN rash    --------------------------------------------------------------------------------------    VITAL SIGNS, INS/OUTS (last 24 hours):  --------------------------------------------------------------------------------------  T(C): 36.4 (17 @ 14:15), Max: 37.6 (17 @ 21:07)  HR: 96 (17 @ 17:00) (60 - 96)  BP: 95/59 (17 @ 17:00) (90/45 - 119/63)  BP(mean): 69 (17 @ 17:00) (58 - 76)  ABP: 141/50 (17 @ 15:00) (140/50 - 141/50)  ABP(mean): 81 (17 @ 15:00) (81 - 82)  RR: 17 (17 @ 17:00) (14 - 20)  SpO2: 100% (17 @ 17:00) (94% - 100%)  Wt(kg): --  CVP(mm Hg): --  CI: --  CAPILLARY BLOOD GLUCOSE       N/A       @ 07: @ 07:00  --------------------------------------------------------  IN:  Total IN: 0 mL    OUT:    Voided: 700 mL  Total OUT: 700 mL    Total NET: -700 mL       @ 07: @ 17:40  --------------------------------------------------------  IN:    phenylephrine   Infusion: 24.6 mL    sodium chloride 0.9%: 100 mL    sodium chloride 0.9%.: 375 mL  Total IN: 499.6 mL    OUT:    Indwelling Catheter - Urethral: 270 mL  Total OUT: 270 mL    Total NET: 229.6 mL  --------------------------------------------------------------------------------------    EXAM  NEUROLOGY  RASS:   	GCS:    Exam: Lethargic, arousable, following commands    HEENT  Exam: Normocephalic, atraumatic.  EOMI    RESPIRATORY  Exam: Lungs clear to auscultation    CARDIOVASCULAR  Exam: S1, S2.  Regular rate and rhythm    GI/NUTRITION  Exam: Abdomen soft, Non-tender, Non-distended.    VASCULAR  Exam: Extremities warm, pink, well-perfused.    METABOLIC/FLUIDS/ELECTROLYTES  sodium chloride 0.9%. 1000 milliLiter(s) IV Continuous <Continuous>      HEMATOLOGIC  [x] DVT Prophylaxis:   Transfusions:	[] PRBC	[] Platelets		[] FFP	[] Cryoprecipitate    INFECTIOUS DISEASE  Antimicrobials/Immunologic Medications:  piperacillin/tazobactam IVPB. 3.375 Gram(s) IV Intermittent every 8 hours    Tubes/Lines/Drains  ***  [x] Peripheral IV  [] Central Venous Line     	[] R	[] L	[] IJ	[] Fem	[] SC	Date Placed:   [X] Arterial Line		[] R	[] L	[] Fem	[] Rad	[] Ax	Date Placed:   [] PICC:         	[] Midline		[] Mediport  [X] Urinary Catheter		Date Placed:     LABS  --------------------------------------------------------------------------------------  CBC ( @ 14:30)                          8.3<L>                   8.72    )--------------(  100<L>     --    % Neuts, --    % Lymphs, ANC: --                              24.9<L>  CBC ( @ 04:30)                          8.9<L>                   6.12    )--------------(  121<L>     53.3  % Neuts, 34.5  % Lymphs, ANC: 3.26                            29.0<L>    BMP ( @ 14:30)       140     |  108<H>  |  16    			Ca++ --      Ca 7.2<L>       ---------------------------------( 141<H>		Mg 1.9          4.2     |  22      |  0.85  			Ph 4.1     BMP ( @ 04:30)       137     |  101     |  20    			Ca++ --      Ca 7.9<L>       ---------------------------------( 100<H>		Mg 1.8          4.3     |  23      |  1.15  			Ph 3.7         Coags ( @ 14:30)  aPTT 29.5 / INR 1.18<H> / PT 13.1  Coags ( @ 04:35)  aPTT 35.5 / INR 1.12 / PT 12.9      ABG ( @ 14:30)     7.36 / 41 / 151<H> / 22 / -2.5 / 99.0%     Lactate: 0.9   ABG ( @ 13:22)     7.35 / 40 / 366<H> / 21<L> / -3.6 / 100<H>%     Lactate:        Urinalysis ( @ 01:41):     Color: YELLOW / Appearance: CLEAR / S.024 / pH: 6.0 / Gluc: NEGATIVE / Ketones: NEGATIVE / Bili: NEGATIVE / Urobili: NORMAL / Protein :100<H> / Nitrites: NEGATIVE / Leuk.Est: NEGATIVE / RBC: 10-25 / WBC: 2-5 / Sq Epi: OCC / Non Sq Epi:  / Bacteria FEW       -> KNEE FLUID Culture ( @ 11:48)       WBC^White Blood Cells  QNTY CELLS IN GRAM STAIN: MANY (4+)  NOS^No Organisms Seen      NO GROWTH - PRELIMINARY RESULTS  NG    -> URINE MIDSTREAM Culture ( @ 08:31)     NG    NG  NG    -> BLOOD PERIPHERAL Culture ( @ 00:20)       ***** CRITICAL RESULT *****  PERSON CALLED / READ-BACK: GI GELLER/Y  DATE / TIME CALLED: 17  CALLED BY: NARINDER DE  GPCPR^Gram Pos Cocci in Pairs  AFTER: 22 HOURS INCUBATION  BOTTLE: AEROBIC   ANAEROBIC BOTTLES    BLOOD CULTURE PCR  NG  --------------------------------------------------------------------------------------    ASSESSMENT:  77y Female w/ infected Right knee hardware s/p explanation w/ spacer placement, bacteremia    PLAN:   Neurologic: Analgesia w/ dilaudid PRN  Respiratory: Incentive spirometry, OOB  Cardiovascular: Wean phenylephrine as tolerated. Restart plavix on POD 3. Will discuss repeat TTE vs ERIKA for endocarditis workup.   Gastrointestinal/Nutrition: Diet as tolerated  Renal/Genitourinary: C/w resuscitative IVF. Trend BMP, Replete electrolytes PRN.  Hematologic:  BID for orthopedic surgery VTE ppx  Infectious Disease: Vancomycin, Zosyn for bacteremia/septic joint, will narrow when cultures finalize. F/u ID recommendations.  Tubes/Lines/Drains: C/w current lines.  Endocrine: Monitor glucose  Disposition: Admit to SICU

## 2017-12-23 NOTE — CHART NOTE - NSCHARTNOTEFT_GEN_A_CORE
Ortho Post-Op Check    Pt seen and examined. C/o of R knee pain. Stable in SICU, on 0.1 of darin gtt, pressures in the 90s. Asymptomatic. Feeling "OK." No nausea/vomiting. No numbness/tingling.     Vital Signs Last 24 Hrs  T(C): 36.4 (23 Dec 2017 14:15), Max: 37.6 (22 Dec 2017 21:07)  T(F): 97.5 (23 Dec 2017 14:15), Max: 99.6 (22 Dec 2017 21:07)  HR: 96 (23 Dec 2017 17:00) (60 - 96)  BP: 95/59 (23 Dec 2017 17:00) (90/45 - 119/63)  BP(mean): 69 (23 Dec 2017 17:00) (58 - 76)  RR: 17 (23 Dec 2017 17:00) (14 - 20)  SpO2: 100% (23 Dec 2017 17:00) (94% - 100%)    PE  NAD  RLE in BJKI dressing  Compartments soft and compressible  EHL/FHL/TA/GS intact  SILT SP/DP/T/S/S  WWP brisk cap refill                          8.3    8.72  )-----------( 100      ( 23 Dec 2017 14:30 )             24.9   12-23    140  |  108<H>  |  16  ----------------------------<  141<H>  4.2   |  22  |  0.85    Ca    7.2<L>      23 Dec 2017 14:30  Phos  4.1     12-23  Mg     1.9     12-23    TPro  7.1  /  Alb  3.4  /  TBili  0.2  /  DBili  x   /  AST  30  /  ALT  16  /  AlkPhos  83  12-21      77F s/p stage I revision of infected R TKA  - TTWB RLE in KI at all times  - Custom [place] knee brace 0-90 deg ordered, once arrives can swap out KI and remain TTWB with [place] brace on at all times  -  mg BID for DVT ppx  - Hold Plavix x72 hours (may resume 12/26 at 12pm)  - Post-op fluid resuscitation and pressor support as per SICU  - FU ID recs, abx as per ID  - FU OR Cx and pathology  - Daily BCx until neg for eventual PICC placement  - SICU care appreciated Ortho Post-Op Check    Pt seen and examined. C/o of R knee pain. Stable in SICU, on 0.1 of darin gtt, pressures in the 90s. Asymptomatic. Feeling "OK." No nausea/vomiting. No numbness/tingling.     Vital Signs Last 24 Hrs  T(C): 36.4 (23 Dec 2017 14:15), Max: 37.6 (22 Dec 2017 21:07)  T(F): 97.5 (23 Dec 2017 14:15), Max: 99.6 (22 Dec 2017 21:07)  HR: 96 (23 Dec 2017 17:00) (60 - 96)  BP: 95/59 (23 Dec 2017 17:00) (90/45 - 119/63)  BP(mean): 69 (23 Dec 2017 17:00) (58 - 76)  RR: 17 (23 Dec 2017 17:00) (14 - 20)  SpO2: 100% (23 Dec 2017 17:00) (94% - 100%)    PE  NAD  RLE in BJKI dressing  Compartments soft and compressible  EHL/FHL/TA/GS intact  SILT SP/DP/T/S/S  WWP brisk cap refill                          8.3    8.72  )-----------( 100      ( 23 Dec 2017 14:30 )             24.9   12-23    140  |  108<H>  |  16  ----------------------------<  141<H>  4.2   |  22  |  0.85    Ca    7.2<L>      23 Dec 2017 14:30  Phos  4.1     12-23  Mg     1.9     12-23    TPro  7.1  /  Alb  3.4  /  TBili  0.2  /  DBili  x   /  AST  30  /  ALT  16  /  AlkPhos  83  12-21      77F s/p stage I revision of infected R TKA  - TTWB RLE in KI at all times  - Custom [place] knee brace 0-90 deg ordered, once arrives can swap out KI and remain TTWB with [place] brace on at all times  -  mg BID for DVT ppx  - Hold Plavix x72 hours (may resume 12/26 at 12pm)  - Post-op fluid resuscitation and pressor support as per SICU  - FU ID recs, abx as per ID  - FU OR Cx and pathology  - Daily BCx until neg for eventual PICC placement  - SICU care appreciated      I agree with the above note and have personally seen and examined this patient. All pertinent films have been reviewed. Please refer to clinical documentation of the history, physical examinations, data summary, and both assessment and plan as documented above and with which I agree.    also should get ERIKA per ID to r/o cardiac vegitations    Nate Bass MD  Attending Orthopedic Surgeon

## 2017-12-23 NOTE — CONSULT NOTE ADULT - ATTENDING COMMENTS
The patient is a critical care patient with hemodynamic and metabolic instability in SICU.  I have personally interviewed and examined this patient, reviewed labs and x-rays, discussed with other consultants, House staff and PA's.  I spent   60  minutes  in total providing critical care for the diagnoses, assessment and plan above.  These diagnoses are unrelated to the surgical procedure noted above.  Time involved in performance of separately billable procedures was not counted toward my critical care time.  There is no overlap.    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  Will change to ceftriaxone based on previous cultures (alpha strep).  When more stable will need w/u for endocarditis given her h/o TAVR and pacemaker, and high-grade bacteremia.  Presently afebrile.  Has recent TTE showing no vegetations.  Plan for daily cultures, eventually will need ERIKA.  I95.9 Hypotension, unspecified hypotension type   - on phenylephrine, decreasing dose  D64.9 Anemia, unspecified type   - significant blood loss in OR.  No obvious clinically significant bleeding presently.  Plan for serial CBC  I25.10 Coronary artery disease involving native coronary artery of native heart without angina pectoris   - on ASA  Z91.89 At risk for malnutrition  - on diet
77y s/p b/l TKA with Dr. Bangura, presents with R knee pain x1day. Pt states difficulty walking because of pain but is able to range the knee without significant pain. Pt denies radiation of pain. Pt denies numbness, tingling, or burning. Pt denies hx of trauma. +fever. Pt is community ambulator at baseline. Given ABx in ED prior to aspiration. Prior root canal and tooth infx 3wks ago. Prophylaxed with Clindamycin.    PE: 0-90 ROM, pain with ROM, no short arc, no erythema, min warmth  NVI, obese, +effusion    XR - no loosening    Aspiration - 53K, GS(-)    d/w Dr. Bangura. Will defer care to Orthopaedic arthroplasty  - May require I&D/poly exchange given high CC vs. staged revision  - Will watch cultures to determine bacteria (if any) - given ABx prior to aspiration  - Will d/w Dr. Bass  - d/w patient at length at bedside.
77 year old female with AS s/p TAVR 2016, s/p PPM 2016, bilateral knee OA s/p bilateral TKA (2002), PMR on chronic prednisone, Morbid Obesity presented with dyspnea and right knee pain.     Recently underwent a root canal ~3 weeks ago for which she took clindamycin prophylaxis and then took 1 dose of amoxicillin after the procedure but developed tachycardia after the first dose so she discontinued it.    In the ED febrile to 101.1, tachycardic, WBC 8.76 on presentation. ESR 74 and .3.     UA neg, RVP negative, CXR clear and Knee XR without fracture.     Arthrocentesis of the right knee with 53K cells with 87% PMN and no crystals. Blood cxs growing strep to be IDed.     s/p OR 12/23/17 for R TKA hardware removal with placement of antibiotic spacer.     Assessment:  -Streptococcal bacteremia  -Right knee septic arthritis  -s/p OR for R TKA hardware removal with abx space  -Elevated ESR/ CRP    Recommend:  -Continue zosyn pending speciation and sensitivity of strep   -Repeat bld cxs x 2 sets in the am.  -Trend WBC and monitor for fevers.  -Check TTE.    Mike Jain MD  Pager [phone]  After 5pm/weekends call [phone]

## 2017-12-23 NOTE — PROVIDER CONTACT NOTE (CRITICAL VALUE NOTIFICATION) - SITUATION
Positive blood culture for all 12/21/17 tubes . Gram positive cocci in pairs, Will run Yoink Gamese for strep or staph.

## 2017-12-23 NOTE — PROGRESS NOTE ADULT - SUBJECTIVE AND OBJECTIVE BOX
Cardiology/Vascular Medicine Inpatient Progress Note    Patient seen/examined  Plan for OR debridement knee, possible spacer placement in OR  Clinically stable from cardiovascular perspective  May proceed with OR from our perspective  Continue IV abx  Can hold antiplatelets, but recommending resuming clopidogrel when possible.  If team strongly prefers aspirin immediately post-operative, should be fine, but recommend resuming clopidogrel as early as feasible.    Vital Signs Last 24 Hrs  T(C): 37 (23 Dec 2017 07:05), Max: 37.6 (22 Dec 2017 21:07)  T(F): 98.6 (23 Dec 2017 07:05), Max: 99.6 (22 Dec 2017 21:07)  HR: 67 (23 Dec 2017 07:05) (67 - 77)  BP: 117/48 (23 Dec 2017 07:05) (102/50 - 141/68)  BP(mean): --  RR: 18 (23 Dec 2017 07:05) (18 - 19)  SpO2: 94% (23 Dec 2017 07:05) (94% - 99%)    Appearance: Normal	  HEENT:   Normal oral mucosa, PERRL, EOMI	  Lymphatic: No lymphadenopathy  Cardiovascular: Normal S1 S2, No JVD, No murmurs, No edema  Respiratory: Lungs clear to auscultation	  Psychiatry: A & O x 3, Mood & affect appropriate  Gastrointestinal:  Soft, Non-tender, + BS	  Skin: No rashes, No ecchymoses, No cyanosis	  Neurologic: Non-focal  Extremities: Normal range of motion, No clubbing, cyanosis or edema  Vascular: Peripheral pulses palpable 2+ bilaterally    MEDICATIONS  (STANDING):  buDESOnide 160 MICROgram(s)/formoterol 4.5 MICROgram(s) Inhaler 2 Puff(s) Inhalation two times a day  docusate sodium 100 milliGRAM(s) Oral two times a day  lisinopril 10 milliGRAM(s) Oral daily  pantoprazole    Tablet 40 milliGRAM(s) Oral before breakfast  piperacillin/tazobactam IVPB. 3.375 Gram(s) IV Intermittent every 8 hours  simvastatin 20 milliGRAM(s) Oral at bedtime  sodium chloride 0.9%. 500 milliLiter(s) (50 mL/Hr) IV Continuous <Continuous>    MEDICATIONS  (PRN):  acetaminophen   Tablet 650 milliGRAM(s) Oral every 6 hours PRN For Temp greater than 38 C (100.4 F)  melatonin 3 milliGRAM(s) Oral at bedtime PRN Insomnia  nystatin Powder 1 Application(s) Topical two times a day PRN rash  oxyCODONE    5 mG/acetaminophen 325 mG 1 Tablet(s) Oral every 6 hours PRN Severe Pain (7 - 10)    LABS:	 	                          8.9    6.12  )-----------( 121      ( 23 Dec 2017 04:30 )             29.0   12-23    137  |  101  |  20  ----------------------------<  100<H>  4.3   |  23  |  1.15    Ca    7.9<L>      23 Dec 2017 04:30  Phos  3.7     12-23  Mg     1.8     12-23    TPro  7.1  /  Alb  3.4  /  TBili  0.2  /  DBili  x   /  AST  30  /  ALT  16  /  AlkPhos  83  12-21    PT/INR - ( 23 Dec 2017 04:35 )   PT: 12.9 SEC;   INR: 1.12     PTT - ( 23 Dec 2017 04:35 )  PTT:35.5 SEC    < from: Transthoracic Echocardiogram (12.19.17 @ 07:36) >  Patient name: IRASEMA KOHLER  YOB: 1940   Age: 77 (F)   MR#: [id number]  Study Date: 12/19/2017  Location: O/PSonographer: Lauren Finkelstein  Study quality: Technically good  Referring Physician: Roverto Roblero MD  Blood Pressure: 152/63 mmHg  Height: 160 cm  Weight: 120 kg  BSA: 2.2 m2  ------------------------------------------------------------------------  PROCEDURE: Transthoracic echocardiogram with 2-D, M-Mode  and complete spectral and color flow Doppler.  INDICATION: Atherosclerotic heart disease of native  coronary artery without angina pectoris (I25.10),  Unspecified diastolic (congestive) heart failure (I50.30)  ------------------------------------------------------------------------  DIMENSIONS:  Dimensions:     Normal Values:  LA:     3.5 cm    2.0 - 4.0 cm  Ao:     2.2 cm    2.0 - 3.8 cm  SEPTUM: 0.9 cm    0.6 - 1.2 cm  PWT:    0.8 cm    0.6 - 1.1 cm  LVIDd:  4.6 cm    3.0 - 5.6 cm  LVIDs:  2.6 cm    1.8 - 4.0 cm  Derived Variables:  LVMI: 59 g/m2  RWT: 0.34  Fractional short: 43 %  Ejection Fraction (Teicholtz): 75 %  ------------------------------------------------------------------------  OBSERVATIONS:  Mitral Valve: Mitral annular calcification and calcified  mitral leaflets with normal diastolic opening. Mild mitral  regurgitation.  Mean transmitral valve gradient equals 5 mm  Hg, consistent with moderate mitral stenosis.  Aortic Root: Normal aortic root.  Aortic Valve: TAVR noted. Peak transaortic valve gradient  equals 27 mm Hg, mean transaorticvalve gradient equals 13  mm Hg, which is probably normal in the presence of a  prosthetic valve. Minimal aortic regurgitation.  Left Atrium: Normal left atrium.  LA volume index = 31  cc/m2.  Left Ventricle: Normal left ventricular systolic function.  No segmental wall motion abnormalities. Normal left  ventricular internal dimensions and wall thicknesses.  Right Heart: Normal right atrium. Normal right ventricular  size and function. Normal tricuspid valve.  Mild tricuspid  regurgitation. Normal pulmonic valve. Minimal pulmonic  regurgitation.  Pericardium/PleuraNormal pericardium with no pericardial  effusion.  Hemodynamic: Estimated right ventricular systolic pressure  equals 41 mm Hg, assuming right atrial pressure equals 10  mm Hg, consistent with mild pulmonary hypertension.  ------------------------------------------------------------------------  CONCLUSIONS:  1. TAVR noted. Peak transaortic valve gradient equals 27 mm  Hg, mean transaortic valve gradient equals 13 mm Hg, which  is probably normal in the presence of a prosthetic valve.  Minimal aortic regurgitation.  2. Normal left ventricular internal dimensions and wall  thicknesses.  3. Normal left ventricular systolic function. No segmental  wall motion abnormalities.  4.Normal right ventricular size and function.  5. Normal tricuspid valve.  Mild tricuspid regurgitation.  6. Estimated pulmonary artery systolic pressure equals 41  mm Hg, assuming right atrial pressure equals 10  mm Hg,  consistent with mild pulmonaryhypertension.  ------------------------------------------------------------------------  Confirmed on  12/20/2017 - 07:16:23 by Roverto Roblero MD,  [facility], [facility] [place]  ------------------------------------------------------------------------    < end of copied text >

## 2017-12-24 LAB
APTT BLD: 31.8 SEC — SIGNIFICANT CHANGE UP (ref 27.5–37.4)
BASOPHILS # BLD AUTO: 0.02 K/UL — SIGNIFICANT CHANGE UP (ref 0–0.2)
BASOPHILS NFR BLD AUTO: 0.2 % — SIGNIFICANT CHANGE UP (ref 0–2)
BUN SERPL-MCNC: 13 MG/DL — SIGNIFICANT CHANGE UP (ref 7–23)
BUN SERPL-MCNC: 13 MG/DL — SIGNIFICANT CHANGE UP (ref 7–23)
CALCIUM SERPL-MCNC: 6.8 MG/DL — LOW (ref 8.4–10.5)
CALCIUM SERPL-MCNC: 6.9 MG/DL — LOW (ref 8.4–10.5)
CHLORIDE SERPL-SCNC: 106 MMOL/L — SIGNIFICANT CHANGE UP (ref 98–107)
CHLORIDE SERPL-SCNC: 108 MMOL/L — HIGH (ref 98–107)
CO2 SERPL-SCNC: 22 MMOL/L — SIGNIFICANT CHANGE UP (ref 22–31)
CO2 SERPL-SCNC: 23 MMOL/L — SIGNIFICANT CHANGE UP (ref 22–31)
CREAT SERPL-MCNC: 1.05 MG/DL — SIGNIFICANT CHANGE UP (ref 0.5–1.3)
CREAT SERPL-MCNC: 1.16 MG/DL — SIGNIFICANT CHANGE UP (ref 0.5–1.3)
CULTURE - ACID FAST SMEAR CONCENTRATED: SIGNIFICANT CHANGE UP
EOSINOPHIL # BLD AUTO: 0.16 K/UL — SIGNIFICANT CHANGE UP (ref 0–0.5)
EOSINOPHIL NFR BLD AUTO: 1.8 % — SIGNIFICANT CHANGE UP (ref 0–6)
GLUCOSE SERPL-MCNC: 115 MG/DL — HIGH (ref 70–99)
GLUCOSE SERPL-MCNC: 123 MG/DL — HIGH (ref 70–99)
HCT VFR BLD CALC: 21.6 % — LOW (ref 34.5–45)
HCT VFR BLD CALC: 22 % — LOW (ref 34.5–45)
HCT VFR BLD CALC: 22.2 % — LOW (ref 34.5–45)
HGB BLD-MCNC: 7.1 G/DL — LOW (ref 11.5–15.5)
HGB BLD-MCNC: 7.3 G/DL — LOW (ref 11.5–15.5)
HGB BLD-MCNC: 7.5 G/DL — LOW (ref 11.5–15.5)
IMM GRANULOCYTES # BLD AUTO: 0.03 # — SIGNIFICANT CHANGE UP
IMM GRANULOCYTES NFR BLD AUTO: 0.3 % — SIGNIFICANT CHANGE UP (ref 0–1.5)
INR BLD: 1.08 — SIGNIFICANT CHANGE UP (ref 0.88–1.17)
LYMPHOCYTES # BLD AUTO: 1.78 K/UL — SIGNIFICANT CHANGE UP (ref 1–3.3)
LYMPHOCYTES # BLD AUTO: 20.2 % — SIGNIFICANT CHANGE UP (ref 13–44)
MAGNESIUM SERPL-MCNC: 1.6 MG/DL — SIGNIFICANT CHANGE UP (ref 1.6–2.6)
MAGNESIUM SERPL-MCNC: 2.2 MG/DL — SIGNIFICANT CHANGE UP (ref 1.6–2.6)
MCHC RBC-ENTMCNC: 27.9 PG — SIGNIFICANT CHANGE UP (ref 27–34)
MCHC RBC-ENTMCNC: 28.7 PG — SIGNIFICANT CHANGE UP (ref 27–34)
MCHC RBC-ENTMCNC: 29.4 PG — SIGNIFICANT CHANGE UP (ref 27–34)
MCHC RBC-ENTMCNC: 32.9 % — SIGNIFICANT CHANGE UP (ref 32–36)
MCHC RBC-ENTMCNC: 32.9 % — SIGNIFICANT CHANGE UP (ref 32–36)
MCHC RBC-ENTMCNC: 34.1 % — SIGNIFICANT CHANGE UP (ref 32–36)
MCV RBC AUTO: 84.7 FL — SIGNIFICANT CHANGE UP (ref 80–100)
MCV RBC AUTO: 86.3 FL — SIGNIFICANT CHANGE UP (ref 80–100)
MCV RBC AUTO: 87.4 FL — SIGNIFICANT CHANGE UP (ref 80–100)
MONOCYTES # BLD AUTO: 0.65 K/UL — SIGNIFICANT CHANGE UP (ref 0–0.9)
MONOCYTES NFR BLD AUTO: 7.4 % — SIGNIFICANT CHANGE UP (ref 2–14)
NEUTROPHILS # BLD AUTO: 6.17 K/UL — SIGNIFICANT CHANGE UP (ref 1.8–7.4)
NEUTROPHILS NFR BLD AUTO: 70.1 % — SIGNIFICANT CHANGE UP (ref 43–77)
NRBC # FLD: 0 — SIGNIFICANT CHANGE UP
ORGANISM # SPEC MICROSCOPIC CNT: SIGNIFICANT CHANGE UP
ORGANISM # SPEC MICROSCOPIC CNT: SIGNIFICANT CHANGE UP
PHOSPHATE SERPL-MCNC: 3.7 MG/DL — SIGNIFICANT CHANGE UP (ref 2.5–4.5)
PHOSPHATE SERPL-MCNC: 4.4 MG/DL — SIGNIFICANT CHANGE UP (ref 2.5–4.5)
PLATELET # BLD AUTO: 106 K/UL — LOW (ref 150–400)
PLATELET # BLD AUTO: 68 K/UL — LOW (ref 150–400)
PLATELET # BLD AUTO: 74 K/UL — LOW (ref 150–400)
PMV BLD: 11.8 FL — SIGNIFICANT CHANGE UP (ref 7–13)
PMV BLD: 12.1 FL — SIGNIFICANT CHANGE UP (ref 7–13)
PMV BLD: 12.7 FL — SIGNIFICANT CHANGE UP (ref 7–13)
POTASSIUM SERPL-MCNC: 4.3 MMOL/L — SIGNIFICANT CHANGE UP (ref 3.5–5.3)
POTASSIUM SERPL-MCNC: 4.6 MMOL/L — SIGNIFICANT CHANGE UP (ref 3.5–5.3)
POTASSIUM SERPL-SCNC: 4.3 MMOL/L — SIGNIFICANT CHANGE UP (ref 3.5–5.3)
POTASSIUM SERPL-SCNC: 4.6 MMOL/L — SIGNIFICANT CHANGE UP (ref 3.5–5.3)
PROTHROM AB SERPL-ACNC: 12.4 SEC — SIGNIFICANT CHANGE UP (ref 9.8–13.1)
RBC # BLD: 2.47 M/UL — LOW (ref 3.8–5.2)
RBC # BLD: 2.55 M/UL — LOW (ref 3.8–5.2)
RBC # BLD: 2.62 M/UL — LOW (ref 3.8–5.2)
RBC # FLD: 15.2 % — HIGH (ref 10.3–14.5)
RBC # FLD: 15.3 % — HIGH (ref 10.3–14.5)
RBC # FLD: 15.3 % — HIGH (ref 10.3–14.5)
SODIUM SERPL-SCNC: 139 MMOL/L — SIGNIFICANT CHANGE UP (ref 135–145)
SODIUM SERPL-SCNC: 141 MMOL/L — SIGNIFICANT CHANGE UP (ref 135–145)
SPECIMEN SOURCE: SIGNIFICANT CHANGE UP
VANCOMYCIN FLD-MCNC: 17.6 UG/ML — SIGNIFICANT CHANGE UP
WBC # BLD: 7.02 K/UL — SIGNIFICANT CHANGE UP (ref 3.8–10.5)
WBC # BLD: 7.02 K/UL — SIGNIFICANT CHANGE UP (ref 3.8–10.5)
WBC # BLD: 8.81 K/UL — SIGNIFICANT CHANGE UP (ref 3.8–10.5)
WBC # FLD AUTO: 7.02 K/UL — SIGNIFICANT CHANGE UP (ref 3.8–10.5)
WBC # FLD AUTO: 7.02 K/UL — SIGNIFICANT CHANGE UP (ref 3.8–10.5)
WBC # FLD AUTO: 8.81 K/UL — SIGNIFICANT CHANGE UP (ref 3.8–10.5)

## 2017-12-24 PROCEDURE — 99291 CRITICAL CARE FIRST HOUR: CPT

## 2017-12-24 PROCEDURE — 99232 SBSQ HOSP IP/OBS MODERATE 35: CPT

## 2017-12-24 RX ORDER — PHENYLEPHRINE HYDROCHLORIDE 10 MG/ML
0.5 INJECTION INTRAVENOUS
Qty: 160 | Refills: 0 | Status: DISCONTINUED | OUTPATIENT
Start: 2017-12-24 | End: 2017-12-24

## 2017-12-24 RX ORDER — ALBUMIN HUMAN 25 %
250 VIAL (ML) INTRAVENOUS ONCE
Qty: 0 | Refills: 0 | Status: COMPLETED | OUTPATIENT
Start: 2017-12-24 | End: 2017-12-24

## 2017-12-24 RX ORDER — PHENYLEPHRINE HYDROCHLORIDE 10 MG/ML
0.9 INJECTION INTRAVENOUS
Qty: 160 | Refills: 0 | Status: DISCONTINUED | OUTPATIENT
Start: 2017-12-24 | End: 2017-12-25

## 2017-12-24 RX ORDER — MAGNESIUM SULFATE 500 MG/ML
2 VIAL (ML) INJECTION ONCE
Qty: 0 | Refills: 0 | Status: COMPLETED | OUTPATIENT
Start: 2017-12-24 | End: 2017-12-24

## 2017-12-24 RX ORDER — ACETAMINOPHEN 500 MG
650 TABLET ORAL EVERY 6 HOURS
Qty: 0 | Refills: 0 | Status: DISCONTINUED | OUTPATIENT
Start: 2017-12-24 | End: 2018-01-02

## 2017-12-24 RX ORDER — SODIUM CHLORIDE 9 MG/ML
1000 INJECTION, SOLUTION INTRAVENOUS
Qty: 0 | Refills: 0 | Status: DISCONTINUED | OUTPATIENT
Start: 2017-12-24 | End: 2017-12-25

## 2017-12-24 RX ORDER — HYDROCORTISONE 20 MG
50 TABLET ORAL EVERY 6 HOURS
Qty: 0 | Refills: 0 | Status: DISCONTINUED | OUTPATIENT
Start: 2017-12-24 | End: 2017-12-26

## 2017-12-24 RX ADMIN — HYDROMORPHONE HYDROCHLORIDE 1 MILLIGRAM(S): 2 INJECTION INTRAMUSCULAR; INTRAVENOUS; SUBCUTANEOUS at 23:10

## 2017-12-24 RX ADMIN — PIPERACILLIN AND TAZOBACTAM 25 GRAM(S): 4; .5 INJECTION, POWDER, LYOPHILIZED, FOR SOLUTION INTRAVENOUS at 08:56

## 2017-12-24 RX ADMIN — Medication 50 MILLIGRAM(S): at 13:50

## 2017-12-24 RX ADMIN — Medication 650 MILLIGRAM(S): at 06:47

## 2017-12-24 RX ADMIN — SODIUM CHLORIDE 125 MILLILITER(S): 9 INJECTION INTRAMUSCULAR; INTRAVENOUS; SUBCUTANEOUS at 08:59

## 2017-12-24 RX ADMIN — BUDESONIDE AND FORMOTEROL FUMARATE DIHYDRATE 2 PUFF(S): 160; 4.5 AEROSOL RESPIRATORY (INHALATION) at 08:25

## 2017-12-24 RX ADMIN — HYDROMORPHONE HYDROCHLORIDE 1 MILLIGRAM(S): 2 INJECTION INTRAMUSCULAR; INTRAVENOUS; SUBCUTANEOUS at 12:20

## 2017-12-24 RX ADMIN — Medication 125 MILLILITER(S): at 01:14

## 2017-12-24 RX ADMIN — Medication 325 MILLIGRAM(S): at 18:10

## 2017-12-24 RX ADMIN — PANTOPRAZOLE SODIUM 40 MILLIGRAM(S): 20 TABLET, DELAYED RELEASE ORAL at 06:46

## 2017-12-24 RX ADMIN — Medication 50 MILLIGRAM(S): at 18:10

## 2017-12-24 RX ADMIN — CEFTRIAXONE 100 GRAM(S): 500 INJECTION, POWDER, FOR SOLUTION INTRAMUSCULAR; INTRAVENOUS at 18:09

## 2017-12-24 RX ADMIN — HYDROMORPHONE HYDROCHLORIDE 1 MILLIGRAM(S): 2 INJECTION INTRAMUSCULAR; INTRAVENOUS; SUBCUTANEOUS at 12:35

## 2017-12-24 RX ADMIN — HYDROMORPHONE HYDROCHLORIDE 1 MILLIGRAM(S): 2 INJECTION INTRAMUSCULAR; INTRAVENOUS; SUBCUTANEOUS at 23:25

## 2017-12-24 RX ADMIN — SODIUM CHLORIDE 125 MILLILITER(S): 9 INJECTION, SOLUTION INTRAVENOUS at 14:28

## 2017-12-24 RX ADMIN — HYDROMORPHONE HYDROCHLORIDE 1 MILLIGRAM(S): 2 INJECTION INTRAMUSCULAR; INTRAVENOUS; SUBCUTANEOUS at 02:30

## 2017-12-24 RX ADMIN — Medication 325 MILLIGRAM(S): at 06:25

## 2017-12-24 RX ADMIN — Medication 50 GRAM(S): at 06:25

## 2017-12-24 RX ADMIN — Medication 125 MILLILITER(S): at 01:31

## 2017-12-24 RX ADMIN — BUDESONIDE AND FORMOTEROL FUMARATE DIHYDRATE 2 PUFF(S): 160; 4.5 AEROSOL RESPIRATORY (INHALATION) at 20:36

## 2017-12-24 RX ADMIN — HYDROMORPHONE HYDROCHLORIDE 1 MILLIGRAM(S): 2 INJECTION INTRAMUSCULAR; INTRAVENOUS; SUBCUTANEOUS at 02:16

## 2017-12-24 NOTE — PROGRESS NOTE ADULT - ATTENDING COMMENTS
I agree with the above note and have personally seen and examined this patient. All pertinent films have been reviewed. Please refer to clinical documentation of the history, physical examinations, data summary, and both assessment and plan as documented above and with which I agree.    patient needs ERIKA  knee aspirate cultures from admission now positive, follow OR cultures  IV abx per ID  hold plavix until Tuesday if possible, ASA 325bid meantime  TTWB in KI  ortho to manage RLE dressings, will be changed by ortho team Monday    Nate Bass MD  Attending Orthopedic Surgeon

## 2017-12-24 NOTE — PROGRESS NOTE ADULT - ATTENDING COMMENTS
The patient is a critical care patient with hemodynamic and metabolic instability in SICU.  I have personally interviewed and examined this patient, reviewed labs and x-rays, discussed with other consultants, House staff and PA's.  I spent   45  minutes  in total providing critical care for the diagnoses, assessment and plan above.  These diagnoses are unrelated to the surgical procedure noted above.  Time involved in performance of separately billable procedures was not counted toward my critical care time.  There is no overlap.    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 lactate normal  I95.9 Hypotension, unspecified hypotension type   - off pressors  D64.9 Anemia, unspecified type   - no apparent bleeding on exam.  possibly equilibrating from previous blood loss, and is s/p multiple fluid boluses overnight  I25.10 Coronary artery disease involving native coronary artery of native heart without angina pectoris   - on asa  Z91.89 At risk for malnutrition  - on diet

## 2017-12-24 NOTE — PROGRESS NOTE ADULT - SUBJECTIVE AND OBJECTIVE BOX
Cardiology/Vascular Medicine Inpatient Progress Note    POD #1 Stage 1 revision infected knee prosthesis  Off darin gtt now, hemodynamically stable  Clinically stable from cardiovascular perspective  May proceed with OR from our perspective  Continue IV abx (on Ceftriaxone, Zosyn), eventual PICC line when cultures are negative  On aspirin, recommend resuming clopidogrel when possible.    Vital Signs Last 24 Hrs  T(C): 37 (24 Dec 2017 08:00), Max: 37.7 (24 Dec 2017 00:00)  T(F): 98.6 (24 Dec 2017 08:00), Max: 99.8 (24 Dec 2017 00:00)  HR: 75 (24 Dec 2017 08:26) (60 - 101)  BP: 97/27 (24 Dec 2017 08:00) (71/51 - 124/27)  BP(mean): 43 (24 Dec 2017 08:00) (35 - 76)  RR: 19 (24 Dec 2017 08:00) (13 - 23)  SpO2: 100% (24 Dec 2017 08:26) (73% - 100%)    Appearance: Normal	  HEENT:   Normal oral mucosa, PERRL, EOMI	  Lymphatic: No lymphadenopathy  Cardiovascular: Normal S1 S2, No JVD, No murmurs, No edema  Respiratory: Lungs clear to auscultation	  Psychiatry: A & O x 3, Mood & affect appropriate  Gastrointestinal:  Soft, Non-tender, + BS	  Skin: No rashes, No ecchymoses, No cyanosis	  Neurologic: Non-focal  Extremities: Normal range of motion, No clubbing, cyanosis, +dressings    MEDICATIONS  (STANDING):  aspirin 325 milliGRAM(s) Oral every 12 hours  buDESOnide 160 MICROgram(s)/formoterol 4.5 MICROgram(s) Inhaler 2 Puff(s) Inhalation two times a day  cefTRIAXone   IVPB      cefTRIAXone   IVPB 2 Gram(s) IV Intermittent every 24 hours  docusate sodium 100 milliGRAM(s) Oral two times a day  lisinopril 10 milliGRAM(s) Oral daily  pantoprazole    Tablet 40 milliGRAM(s) Oral before breakfast  phenylephrine    Infusion 0.5 MICROgram(s)/kG/Min (12.328 mL/Hr) IV Continuous <Continuous>  piperacillin/tazobactam IVPB. 3.375 Gram(s) IV Intermittent every 8 hours  simvastatin 20 milliGRAM(s) Oral at bedtime  sodium chloride 0.9%. 1000 milliLiter(s) (125 mL/Hr) IV Continuous <Continuous>    MEDICATIONS  (PRN):  acetaminophen   Tablet 650 milliGRAM(s) Oral every 6 hours PRN For Temp greater than 38 C (100.4 F)  acetaminophen   Tablet. 650 milliGRAM(s) Oral every 6 hours PRN Mild Pain (1 - 3)  HYDROmorphone  Injectable 1 milliGRAM(s) IV Push every 6 hours PRN Moderate Pain (4 - 6)  melatonin 3 milliGRAM(s) Oral at bedtime PRN Insomnia  nystatin Powder 1 Application(s) Topical two times a day PRN rash      LABS:	 	                          7.1    8.81  )-----------( 106      ( 24 Dec 2017 03:34 )             21.6     12-24    141  |  108<H>  |  13  ----------------------------<  115<H>  4.6   |  23  |  1.16    Ca    6.8<L>      24 Dec 2017 03:34  Phos  4.4     12-24  Mg     1.6     12-24      PT/INR - ( 23 Dec 2017 21:25 )   PT: 12.3 SEC;   INR: 1.07     PTT - ( 23 Dec 2017 21:25 )  PTT:30.0 SEC    < from: Transthoracic Echocardiogram (12.19.17 @ 07:36) >  Patient name: IRASEMA KOHLER  YOB: 1940   Age: 77 (F)   MR#: [id number]  Study Date: 12/19/2017  Location: O/PSonographer: Lauren Finkelstein  Study quality: Technically good  Referring Physician: Roverto Roblero MD  Blood Pressure: 152/63 mmHg  Height: 160 cm  Weight: 120 kg  BSA: 2.2 m2  ------------------------------------------------------------------------  PROCEDURE: Transthoracic echocardiogram with 2-D, M-Mode  and complete spectral and color flow Doppler.  INDICATION: Atherosclerotic heart disease of native  coronary artery without angina pectoris (I25.10),  Unspecified diastolic (congestive) heart failure (I50.30)  ------------------------------------------------------------------------  DIMENSIONS:  Dimensions:     Normal Values:  LA:     3.5 cm    2.0 - 4.0 cm  Ao:     2.2 cm    2.0 - 3.8 cm  SEPTUM: 0.9 cm    0.6 - 1.2 cm  PWT:    0.8 cm    0.6 - 1.1 cm  LVIDd:  4.6 cm    3.0 - 5.6 cm  LVIDs:  2.6 cm    1.8 - 4.0 cm  Derived Variables:  LVMI: 59 g/m2  RWT: 0.34  Fractional short: 43 %  Ejection Fraction (Teicholtz): 75 %  ------------------------------------------------------------------------  OBSERVATIONS:  Mitral Valve: Mitral annular calcification and calcified  mitral leaflets with normal diastolic opening. Mild mitral  regurgitation.  Mean transmitral valve gradient equals 5 mm  Hg, consistent with moderate mitral stenosis.  Aortic Root: Normal aortic root.  Aortic Valve: TAVR noted. Peak transaortic valve gradient  equals 27 mm Hg, mean transaorticvalve gradient equals 13  mm Hg, which is probably normal in the presence of a  prosthetic valve. Minimal aortic regurgitation.  Left Atrium: Normal left atrium.  LA volume index = 31  cc/m2.  Left Ventricle: Normal left ventricular systolic function.  No segmental wall motion abnormalities. Normal left  ventricular internal dimensions and wall thicknesses.  Right Heart: Normal right atrium. Normal right ventricular  size and function. Normal tricuspid valve.  Mild tricuspid  regurgitation. Normal pulmonic valve. Minimal pulmonic  regurgitation.  Pericardium/PleuraNormal pericardium with no pericardial  effusion.  Hemodynamic: Estimated right ventricular systolic pressure  equals 41 mm Hg, assuming right atrial pressure equals 10  mm Hg, consistent with mild pulmonary hypertension.  ------------------------------------------------------------------------  CONCLUSIONS:  1. TAVR noted. Peak transaortic valve gradient equals 27 mm  Hg, mean transaortic valve gradient equals 13 mm Hg, which  is probably normal in the presence of a prosthetic valve.  Minimal aortic regurgitation.  2. Normal left ventricular internal dimensions and wall  thicknesses.  3. Normal left ventricular systolic function. No segmental  wall motion abnormalities.  4.Normal right ventricular size and function.  5. Normal tricuspid valve.  Mild tricuspid regurgitation.  6. Estimated pulmonary artery systolic pressure equals 41  mm Hg, assuming right atrial pressure equals 10  mm Hg,  consistent with mild pulmonaryhypertension.  ------------------------------------------------------------------------  Confirmed on  12/20/2017 - 07:16:23 by Roverto Roblero MD,  [facility], [place], [place]  ------------------------------------------------------------------------    < end of copied text >

## 2017-12-24 NOTE — PROGRESS NOTE ADULT - SUBJECTIVE AND OBJECTIVE BOX
SICU AM Note  =====================================================  Interval/Overnight Events: BP fluctuated throughout the night, though patient consistently mentating well. Kedar discontinued, patient given a total of .5 L bolus LR and 500 albumin. Complained of some pain overnight, given IV tylenol with good effect.     HPI: 77y female s/p BL total knee replacement presented with infected right knee replacement. Blood cultures positive for strep sp. Underwent explantation of R knee hardware with washout and spacer placement. Patient had significant blood loss and required massive resuscitation. Patient extubated at conclusion of procedure. SICU consulted for continued post-operative resuscitation. Patient maintained on low dose phenylephrine gtt intra-operatively. Admitted to SICU for resuscitation and close monitoring.     Allergies:   IV Contrast (Flushing (Skin); Nausea)    PAST MEDICAL & SURGICAL HISTORY:  CAD (coronary artery disease): HERBERT to LAD 2016  Aortic stenosis, severe  Uncomplicated asthma, unspecified asthma severity  Polymyalgia  Lumbar disc disease with radiculopathy  Spider veins  Anxiety  Severe aortic stenosis by prior echocardiogram:  and  2016  Dysphagia  Morbid obesity with BMI of 50.0-59.9, adult  Macular degeneration  Hiatal hernia  GERD (gastroesophageal reflux disease)  Sciatica  Osteoarthritis  H/O cardiac catheterization: 16 HERBERT placed on LAD  H/O endoscopy: with dilatation of esophageal stricture   S/P cataract surgery: x2; 3-4yr ago  S/P gastroplasty: 28 yrs ago  S/P cholecystectomy: more than 15 years ago  S/P total knee replacement: left, 15yr ago  S/P total knee replacement: right, 12yr ago  S/P hysterectomy: 24yr ago    FAMILY HISTORY:  No pertinent family history in first degree relatives    ADVANCE DIRECTIVES: Full Code    REVIEW OF SYSTEMS:  Non-contributory    CURRENT MEDICATIONS:   --------------------------------------------------------------------------------------  Neurologic Medications  acetaminophen   Tablet 650 milliGRAM(s) Oral every 6 hours PRN For Temp greater than 38 C (100.4 F)  aspirin 325 milliGRAM(s) Oral every 12 hours  HYDROmorphone  Injectable 1 milliGRAM(s) IV Push every 6 hours PRN Moderate Pain (4 - 6)  melatonin 3 milliGRAM(s) Oral at bedtime PRN Insomnia    Respiratory Medications  buDESOnide 160 MICROgram(s)/formoterol 4.5 MICROgram(s) Inhaler 2 Puff(s) Inhalation two times a day    Cardiovascular Medications  lisinopril 10 milliGRAM(s) Oral daily  phenylephrine    Infusion 0.5 MICROgram(s)/kG/Min IV Continuous <Continuous>    Gastrointestinal Medications  docusate sodium 100 milliGRAM(s) Oral two times a day  pantoprazole    Tablet 40 milliGRAM(s) Oral before breakfast  sodium chloride 0.9%. 1000 milliLiter(s) IV Continuous <Continuous>    Genitourinary Medications    Hematologic/Oncologic Medications    Antimicrobial/Immunologic Medications  cefTRIAXone   IVPB      cefTRIAXone   IVPB 2 Gram(s) IV Intermittent every 24 hours  piperacillin/tazobactam IVPB. 3.375 Gram(s) IV Intermittent every 8 hours    Endocrine/Metabolic Medications  simvastatin 20 milliGRAM(s) Oral at bedtime    Topical/Other Medications  nystatin Powder 1 Application(s) Topical two times a day PRN rash  --------------------------------------------------------------------------------------    VITAL SIGNS, INS/OUTS (last 24 hours):  --------------------------------------------------------------------------------------  T(C): 37.7 (17 @ 00:00), Max: 37.7 (17 @ 00:00)  HR: 84 (17 @ 01:00) (60 - 101)  BP: 84/20 (17 01:00) (71/51 - 124/27)  BP(mean): 35 (17 @ 01:00) (35 - 76)  ABP: 141/50 (17 @ 15:00) (140/50 - 141/50)  ABP(mean): 81 (17 @ 15:00) (81 - 82)  RR: 14 (17 @ 01:00) (14 - 22)  SpO2: 97% (17 01:00) (92% - 100%)  Wt(kg): --  CVP(mm Hg): --  CI: --  CAPILLARY BLOOD GLUCOSE       N/A       @ 07:01  -   @ 07:00  --------------------------------------------------------  IN:  Total IN: 0 mL    OUT:    Voided: 700 mL  Total OUT: 700 mL    Total NET: -700 mL       @ 07:01  -   @ 01:34  --------------------------------------------------------  IN:    Albumin 5%  - 250 mL: 250 mL    IV PiggyBack: 400 mL    Oral Fluid: 300 mL    phenylephrine   Infusion: 51.5 mL    sodium chloride 0.9%: 100 mL    sodium chloride 0.9%.: 1375 mL  Total IN: 2476.5 mL    OUT:    Indwelling Catheter - Urethral: 645 mL  Total OUT: 645 mL    Total NET: 1831.5 mL  --------------------------------------------------------------------------------------    EXAM  NEUROLOGY  RASS: 0  	GCS:  15  Exam: Alert, follows commands. No focal deficits.    HEENT  Exam: Normocephalic, atraumatic.  EOMI    RESPIRATORY  Exam: Lungs clear to auscultation    CARDIOVASCULAR  Exam: S1, S2.  Regular rate and rhythm    GI/NUTRITION  Exam: Abdomen soft, Non-tender, Non-distended.    VASCULAR  Exam: RLE wrapped in ace and covered with a brace. + pulse, motor, and sensation distally. Extremities warm, pink, well-perfused.    METABOLIC/FLUIDS/ELECTROLYTES  sodium chloride 0.9%. 1000 milliLiter(s) IV Continuous <Continuous>      HEMATOLOGIC  [x] DVT Prophylaxis: ASA  Transfusions:	[] PRBC	[] Platelets		[] FFP	[] Cryoprecipitate    INFECTIOUS DISEASE  Antimicrobials/Immunologic Medications:  piperacillin/tazobactam IVPB. 3.375 Gram(s) IV Intermittent every 8 hours    Tubes/Lines/Drains    [x] Peripheral IV  [] Central Venous Line     	[] R	[] L	[] IJ	[] Fem	[] SC	Date Placed:   [X] Arterial Line		[] R	[] L	[] Fem	[] Rad	[] Ax	Date Placed:   [] PICC:         	[] Midline		[] Mediport  [X] Urinary Catheter		Date Placed:     LABS  --------------------------------------------------------------------------------------  CBC ( @ 21:24)                              8.6<L>                         8.53    )----------------(  105<L>     --    % Neutrophils, --    % Lymphocytes, ANC: --                                  26.5<L>  CBC ( @ 14:30)                              8.3<L>                         8.72    )----------------(  100<L>     --    % Neutrophils, --    % Lymphocytes, ANC: --                                  24.9<L>    BMP (12 @ 21:24)             140     |  108<H>  |  13    		Ca++ --      Ca 7.3<L>             ---------------------------------( 119<H>		Mg 1.7                4.5     |  21<L>   |  0.94  			Ph 3.9     BMP ( @ 14:30)             140     |  108<H>  |  16    		Ca++ --      Ca 7.2<L>             ---------------------------------( 141<H>		Mg 1.9                4.2     |  22      |  0.85  			Ph 4.1         Coags ( @ 21:25)  aPTT 30.0 / INR 1.07 / PT 12.3  Coags ( @ 14:30)  aPTT 29.5 / INR 1.18<H> / PT 13.1      ABG ( @ 21:24)     7.37 / 41 / 91 / 23 / -1.8 / 97.1%     Lactate: 1.0   ABG ( @ 14:30)     7.36 / 41 / 151<H> / 22 / -2.5 / 99.0%     Lactate: 0.9       Urinalysis ( @ 01:41):     Color: YELLOW / Appearance: CLEAR / S.024 / pH: 6.0 / Gluc: NEGATIVE / Ketones: NEGATIVE / Bili: NEGATIVE / Urobili: NORMAL / Protein :100<H> / Nitrites: NEGATIVE / Leuk.Est: NEGATIVE / RBC: 10-25 / WBC: 2-5 / Sq Epi: OCC / Non Sq Epi:  / Bacteria FEW       -> KNEE FLUID Culture ( @ 11:48)       WBC^White Blood Cells  QNTY CELLS IN GRAM STAIN: MANY (4+)  NOS^No Organisms Seen      NO GROWTH - PRELIMINARY RESULTS  NG    -> URINE MIDSTREAM Culture ( @ 08:31)     NG    NG  NG    -> BLOOD PERIPHERAL Culture ( @ 00:20)       ***** CRITICAL RESULT *****  PERSON CALLED / READ-BACK: GI GELLER/Y  DATE / TIME CALLED: 17  CALLED BY: NARINDER DE  GPCPR^Gram Pos Cocci in Pairs  AFTER: 22 HOURS INCUBATION  BOTTLE: AEROBIC   ANAEROBIC BOTTLES    BLOOD CULTURE PCR  NG  --------------------------------------------------------------------------------------    ASSESSMENT:  77y Female w/ infected Right knee hardware s/p explanation w/ spacer placement, bacteremia    PLAN:   Neurologic: Analgesia w/ dilaudid PRN  Respiratory: Incentive spirometry, OOB  Cardiovascular: Wean phenylephrine as tolerated. Restart plavix on POD 3. Will discuss repeat TTE vs ERIKA for endocarditis workup.   Gastrointestinal/Nutrition: Diet as tolerated  Renal/Genitourinary: C/w resuscitative IVF. Trend BMP, Replete electrolytes PRN.  Hematologic:  BID for orthopedic surgery VTE ppx  Infectious Disease: Vancomycin, Zosyn for bacteremia/septic joint, will narrow when cultures finalize. F/u ID recommendations.  Tubes/Lines/Drains: C/w current lines.  Endocrine: Monitor glucose  Disposition: Admit to SICU SICU AM Note  =====================================================  Interval/Overnight Events: BP fluctuated throughout the night, though patient consistently mentating well. Kedar discontinued, patient given a total of .5 L bolus LR and 500 albumin. Complained of some pain overnight, given IV tylenol with good effect.     HPI: 77y female s/p BL total knee replacement presented with infected right knee replacement. Blood cultures positive for strep sp. Underwent explantation of R knee hardware with washout and spacer placement. Patient had significant blood loss and required massive resuscitation. Patient extubated at conclusion of procedure. SICU consulted for continued post-operative resuscitation. Patient maintained on low dose phenylephrine gtt intra-operatively. Admitted to SICU for resuscitation and close monitoring.     Allergies:   IV Contrast (Flushing (Skin); Nausea)    PAST MEDICAL & SURGICAL HISTORY:  CAD (coronary artery disease): HERBERT to LAD 2016  Aortic stenosis, severe  Uncomplicated asthma, unspecified asthma severity  Polymyalgia  Lumbar disc disease with radiculopathy  Spider veins  Anxiety  Severe aortic stenosis by prior echocardiogram:  and  2016  Dysphagia  Morbid obesity with BMI of 50.0-59.9, adult  Macular degeneration  Hiatal hernia  GERD (gastroesophageal reflux disease)  Sciatica  Osteoarthritis  H/O cardiac catheterization: 16 HERBERT placed on LAD  H/O endoscopy: with dilatation of esophageal stricture   S/P cataract surgery: x2; 3-4yr ago  S/P gastroplasty: 28 yrs ago  S/P cholecystectomy: more than 15 years ago  S/P total knee replacement: left, 15yr ago  S/P total knee replacement: right, 12yr ago  S/P hysterectomy: 24yr ago    FAMILY HISTORY:  No pertinent family history in first degree relatives    ADVANCE DIRECTIVES: Full Code    REVIEW OF SYSTEMS:  Non-contributory    CURRENT MEDICATIONS:   --------------------------------------------------------------------------------------  Neurologic Medications  acetaminophen   Tablet 650 milliGRAM(s) Oral every 6 hours PRN For Temp greater than 38 C (100.4 F)  aspirin 325 milliGRAM(s) Oral every 12 hours  HYDROmorphone  Injectable 1 milliGRAM(s) IV Push every 6 hours PRN Moderate Pain (4 - 6)  melatonin 3 milliGRAM(s) Oral at bedtime PRN Insomnia    Respiratory Medications  buDESOnide 160 MICROgram(s)/formoterol 4.5 MICROgram(s) Inhaler 2 Puff(s) Inhalation two times a day    Cardiovascular Medications  lisinopril 10 milliGRAM(s) Oral daily  phenylephrine    Infusion 0.5 MICROgram(s)/kG/Min IV Continuous <Continuous>    Gastrointestinal Medications  docusate sodium 100 milliGRAM(s) Oral two times a day  pantoprazole    Tablet 40 milliGRAM(s) Oral before breakfast  sodium chloride 0.9%. 1000 milliLiter(s) IV Continuous <Continuous>    Genitourinary Medications    Hematologic/Oncologic Medications    Antimicrobial/Immunologic Medications  cefTRIAXone   IVPB      cefTRIAXone   IVPB 2 Gram(s) IV Intermittent every 24 hours  piperacillin/tazobactam IVPB. 3.375 Gram(s) IV Intermittent every 8 hours    Endocrine/Metabolic Medications  simvastatin 20 milliGRAM(s) Oral at bedtime    Topical/Other Medications  nystatin Powder 1 Application(s) Topical two times a day PRN rash  --------------------------------------------------------------------------------------    VITAL SIGNS, INS/OUTS (last 24 hours):  --------------------------------------------------------------------------------------  T(C): 37.7 (17 @ 00:00), Max: 37.7 (17 @ 00:00)  HR: 84 (17 @ 01:00) (60 - 101)  BP: 84/20 (17 01:00) (71/51 - 124/27)  BP(mean): 35 (17 @ 01:00) (35 - 76)  ABP: 141/50 (17 @ 15:00) (140/50 - 141/50)  ABP(mean): 81 (17 @ 15:00) (81 - 82)  RR: 14 (17 @ 01:00) (14 - 22)  SpO2: 97% (17 01:00) (92% - 100%)  Wt(kg): --  CVP(mm Hg): --  CI: --  CAPILLARY BLOOD GLUCOSE       N/A       @ 07:01  -   @ 07:00  --------------------------------------------------------  IN:  Total IN: 0 mL    OUT:    Voided: 700 mL  Total OUT: 700 mL    Total NET: -700 mL       @ 07:01  -   @ 01:34  --------------------------------------------------------  IN:    Albumin 5%  - 250 mL: 250 mL    IV PiggyBack: 400 mL    Oral Fluid: 300 mL    phenylephrine   Infusion: 51.5 mL    sodium chloride 0.9%: 100 mL    sodium chloride 0.9%.: 1375 mL  Total IN: 2476.5 mL    OUT:    Indwelling Catheter - Urethral: 645 mL  Total OUT: 645 mL    Total NET: 1831.5 mL  --------------------------------------------------------------------------------------    EXAM  NEUROLOGY  RASS: 0  	GCS:  15  Exam: Alert, follows commands. No focal deficits.    HEENT  Exam: Normocephalic, atraumatic.  EOMI    RESPIRATORY  Exam: Lungs clear to auscultation    CARDIOVASCULAR  Exam: S1, S2.  Regular rate and rhythm    GI/NUTRITION  Exam: Abdomen soft, Non-tender, Non-distended.    VASCULAR  Exam: RLE wrapped in ace and covered with a brace. + pulse, motor, and sensation distally. Extremities warm, pink, well-perfused.    METABOLIC/FLUIDS/ELECTROLYTES  sodium chloride 0.9%. 1000 milliLiter(s) IV Continuous <Continuous>      HEMATOLOGIC  [x] DVT Prophylaxis: ASA  Transfusions:	[] PRBC	[] Platelets		[] FFP	[] Cryoprecipitate    INFECTIOUS DISEASE  Antimicrobials/Immunologic Medications:  piperacillin/tazobactam IVPB. 3.375 Gram(s) IV Intermittent every 8 hours    Tubes/Lines/Drains    [x] Peripheral IV  [] Central Venous Line     	[] R	[] L	[] IJ	[] Fem	[] SC	Date Placed:   [X] Arterial Line		[] R	[] L	[] Fem	[] Rad	[] Ax	Date Placed:   [] PICC:         	[] Midline		[] Mediport  [X] Urinary Catheter		Date Placed:     LABS  --------------------------------------------------------------------------------------  CBC ( @ 21:24)                              8.6<L>                         8.53    )----------------(  105<L>     --    % Neutrophils, --    % Lymphocytes, ANC: --                                  26.5<L>  CBC ( @ 14:30)                              8.3<L>                         8.72    )----------------(  100<L>     --    % Neutrophils, --    % Lymphocytes, ANC: --                                  24.9<L>    BMP (12 @ 21:24)             140     |  108<H>  |  13    		Ca++ --      Ca 7.3<L>             ---------------------------------( 119<H>		Mg 1.7                4.5     |  21<L>   |  0.94  			Ph 3.9     BMP ( @ 14:30)             140     |  108<H>  |  16    		Ca++ --      Ca 7.2<L>             ---------------------------------( 141<H>		Mg 1.9                4.2     |  22      |  0.85  			Ph 4.1         Coags ( @ 21:25)  aPTT 30.0 / INR 1.07 / PT 12.3  Coags ( @ 14:30)  aPTT 29.5 / INR 1.18<H> / PT 13.1      ABG ( @ 21:24)     7.37 / 41 / 91 / 23 / -1.8 / 97.1%     Lactate: 1.0   ABG ( @ 14:30)     7.36 / 41 / 151<H> / 22 / -2.5 / 99.0%     Lactate: 0.9       Urinalysis ( @ 01:41):     Color: YELLOW / Appearance: CLEAR / S.024 / pH: 6.0 / Gluc: NEGATIVE / Ketones: NEGATIVE / Bili: NEGATIVE / Urobili: NORMAL / Protein :100<H> / Nitrites: NEGATIVE / Leuk.Est: NEGATIVE / RBC: 10-25 / WBC: 2-5 / Sq Epi: OCC / Non Sq Epi:  / Bacteria FEW       -> KNEE FLUID Culture ( @ 11:48)       WBC^White Blood Cells  QNTY CELLS IN GRAM STAIN: MANY (4+)  NOS^No Organisms Seen      NO GROWTH - PRELIMINARY RESULTS  NG    -> URINE MIDSTREAM Culture ( @ 08:31)     NG    NG  NG    -> BLOOD PERIPHERAL Culture ( @ 00:20)       ***** CRITICAL RESULT *****  PERSON CALLED / READ-BACK: GI GELLER/Y  DATE / TIME CALLED: 17  CALLED BY: NARINDER DE  GPCPR^Gram Pos Cocci in Pairs  AFTER: 22 HOURS INCUBATION  BOTTLE: AEROBIC   ANAEROBIC BOTTLES    BLOOD CULTURE PCR  NG  --------------------------------------------------------------------------------------    ASSESSMENT:  77y Female w/ infected Right knee hardware s/p explanation w/ spacer placement, bacteremia    PLAN:   Neurologic:   - Continue PRN dilaudid/IV tylenol for analgesia    Respiratory:  - Incentive spirometry, OOB    Cardiovascular:  - Continue to monitor BP, resume phenylephrine PRN  - Restart plavix POD #3  - Repeat TTE vs ERIKA for endocarditis workup.   - Continue lisinopril, zocor, ASA    Gastrointestinal/Nutrition:   - DASH diet  - Continue protonix, colace    Renal/Genitourinary:   - Continue resuscitative IVF  - Trend BMP, replete electrolytes PRN    Hematologic:   -  BID for orthopedic surgery VTE ppx  - Will discuss additional VTE prophylaxis with primary team    Infectious Disease:   - Ceftriaxone, Zosyn for bacteremia/septic joint, will narrow when cultures finalize  - BCx in AM- follow up  - ID reccs appreciated    Tubes/Lines/Drains:  - PIV, lund  - Consider A line if BP doesn't improve/measurements unreliable    Endocrine:   - Monitor glucose on BMP    Disposition: SICU

## 2017-12-24 NOTE — PROGRESS NOTE ADULT - ASSESSMENT
77y female with remote b/l TKR, TAVR and PPM 1 yr ago, CAD- stent, Coumadin, root canal ~ 1 month ago, admitted with 1 wk of fatigue, and septic R knee (53k WBCs)  POD #1- I&D, hardware removal, placement of spacer.    Remains in SICU, afebrile, off Levo- stable immediately post op   Bld Cxs Strep sanguinis in all 4 bottles; could postulate seeding of oral roopa from recent root canal  Synovial fluid growing Alpha Strep as well  Underlying TAVR and PPM, have to be further concerned abt prospect of endocarditis, even in face of recent unrevealing TTE    Plan:  Ceftriaxone 2 g IV q 24- will need 6 wks via PICC; possibly followed by oral regimen  Repeat Bld Cxs sent  Favor ERIKA- r/o valve or lead veg  D/w pt and family at length- natural h/o PJI and endocarditis, meaning of S. sanguinis, all reviewed   D/w SICU Team  > 35 mins spent in direct assessment of the patient, analysis of all pertinent data, discussion, counseling, and coordination of care; benefit, possible adverse effects, and limitations of antibiotics reviewed

## 2017-12-24 NOTE — PROGRESS NOTE ADULT - SUBJECTIVE AND OBJECTIVE BOX
CC: f/u for S sanguinis bacteremia, R knee PJI    Patient reports feeling better in general, pain controlled, off pressors    REVIEW OF SYSTEMS:  All other review of systems negative (Comprehensive ROS)    Antimicrobials POD # 1  cefTRIAXone   IVPB      cefTRIAXone   IVPB 2 Gram(s) IV Intermittent every 24 hours    Other Medications Reviewed    T(F): 98.3 (12-24-17 @ 16:00), Max: 99.8 (12-24-17 @ 00:00)  HR: 78 (12-24-17 @ 16:00)  BP: 63/28 (12-24-17 @ 16:00)  RR: 17 (12-24-17 @ 16:00)  SpO2: 78% (12-24-17 @ 16:00)  Wt(kg): --    PHYSICAL EXAM:  General: alert, no acute distress  Eyes:  anicteric, no conjunctival injection, no discharge  Oropharynx: no lesions or injection 	  Neck: supple, without adenopathy  Lungs: clear to auscultation  Heart: regular rate and rhythm; soft systolic murmur  Abdomen: soft, nondistended, nontender, without mass or organomegaly  Skin: no lesions  Extremities: R knee dressings intact  Neurologic: alert, oriented, moves all extremities    LAB RESULTS:                        7.5    7.02  )-----------( 68       ( 24 Dec 2017 14:47 )             22.0     12-24    139  |  106  |  13  ----------------------------<  123<H>  4.3   |  22  |  1.05    Ca    6.9<L>      24 Dec 2017 14:47  Phos  3.7     12-24  Mg     2.2     12-24      MICROBIOLOGY:  RECENT CULTURES:  Culture - Body Fluid with Gram Stain (12.22.17 @ 11:48)    Gram Stain:   WBC^White Blood Cells  QNTY CELLS IN GRAM STAIN: MANY (4+)  NOS^No Organisms Seen    Culture - Body Fluid:   RARE  STRVI^Streptococcus Viridans Group    Specimen Source: KNEE FLUID    Culture - Blood (12.22.17 @ 00:20)    Culture - Blood:   NO ORGANISMS ISOLATED  NO ORGANISMS ISOLATED AT 24 HOURS    Culture - Blood:   STRSAN^Streptococcus sanguinis    Specimen Source: BLOOD PERIPHERAL    Gram Stain Blood:   ***** CRITICAL RESULT *****  PERSON CALLED / READ-BACK: GI CHAPA.RN/Y  DATE / TIME CALLED: 12/23/17 0044  CALLED BY: NARINDER DE^Gram Pos Cocci in Pairs  AFTER: 22 HOURS INCUBATION  BOTTLE: AEROBIC   ANAEROBIC BOTTLES    Culture - Blood (12.22.17 @ 00:20)    Culture - Blood:   NO ORGANISMS ISOLATED  NO ORGANISMS ISOLATED AT 24 HOURS    Culture - Blood:   STRSAN^Streptococcus sanguinis    Specimen Source: BLOOD PERIPHERAL    Gram Stain Blood:   ***** CRITICAL RESULT *****  PERSON CALLED / READ-BACK: GI CHAPA.RN/Y  DATE / TIME CALLED: 12/23/17 0044  CALLED BY: NARINDER DE^Gram Pos Cocci in Pairs  AFTER: 22 HOURS INCUBATION  BOTTLE: AEROBIC   ANAEROBIC BOTTLES      RADIOLOGY REVIEWED:  Xray Chest 1 View AP/PA (12.22.17 @ 00:52) >  Clear lungs.

## 2017-12-24 NOTE — PROGRESS NOTE ADULT - ASSESSMENT
History of AS s/p TAVR  Normal LV function  Mild pulmonary HTN  Probable diastolic dysfunction  Morbid obesity, HTN, hyperlipidemia  Stable from the cardiovascular perspective.    POD #1 Stage 1 revision infected knee prosthesis  Receiving IV abx  Eventual PICC when blood cultures clear  On aspirin, resume clopidogrel when able.

## 2017-12-24 NOTE — PROGRESS NOTE ADULT - SUBJECTIVE AND OBJECTIVE BOX
Pt seen and examined.  States that she "has felt better." Stable since surgery. Pain is controlled. No n/v, no numbness/tingling.  Received 500 cc albumin overnight. Now getting 1u PRBC for dropping crit 26.5->21.6.  Slight Cr bump from 0.9 to 1.2.  Weaned off of darin gtt this am.     Vital Signs Last 24 Hrs  T(C): 36.7 (24 Dec 2017 04:00), Max: 37.7 (24 Dec 2017 00:00)  T(F): 98 (24 Dec 2017 04:00), Max: 99.8 (24 Dec 2017 00:00)  HR: 85 (24 Dec 2017 06:30) (60 - 101)  BP: 100/33 (24 Dec 2017 06:30) (71/51 - 124/27)  BP(mean): 49 (24 Dec 2017 06:30) (35 - 76)  RR: 18 (24 Dec 2017 06:30) (13 - 23)  SpO2: 87% (24 Dec 2017 06:00) (73% - 100%)    NAD  RLE in BJKI dressing, C/D/I  EHL/FHL/TA/GS intact  SILT SP/DP/T/S/S  WWP brisk cap refill    77F s/p stage I revision of infected R TKA  - Post-op fluid resuscitation and pressor support as per SICU  - Transfuse as needed  - TTWB RLE in KI at all times  - Custom Swain knee brace 0-90 deg ordered, once arrives can swap out KI and remain TTWB with Andie brace on at all times  -  mg BID for DVT ppx  - Hold Plavix x72 hours (may resume 12/26 at 12pm)  - FU ID recs, abx as per ID  - FU OR Cx and pathology  - Daily BCx until neg for eventual PICC placement  - SICU care appreciated.

## 2017-12-25 LAB
-  CEFTRIAXONE: SIGNIFICANT CHANGE UP
-  CLINDAMYCIN: SIGNIFICANT CHANGE UP
-  ERYTHROMYCIN: SIGNIFICANT CHANGE UP
-  PENICILLIN G: SIGNIFICANT CHANGE UP
-  STREPTOCOCCUS SP. (NOT GRP A, B OR S PNEUMONIAE): SIGNIFICANT CHANGE UP
-  VANCOMYCIN: SIGNIFICANT CHANGE UP
BACTERIA BLD CULT: SIGNIFICANT CHANGE UP
BUN SERPL-MCNC: 14 MG/DL — SIGNIFICANT CHANGE UP (ref 7–23)
BUN SERPL-MCNC: 15 MG/DL — SIGNIFICANT CHANGE UP (ref 7–23)
CALCIUM SERPL-MCNC: 7.3 MG/DL — LOW (ref 8.4–10.5)
CALCIUM SERPL-MCNC: 7.4 MG/DL — LOW (ref 8.4–10.5)
CHLORIDE SERPL-SCNC: 106 MMOL/L — SIGNIFICANT CHANGE UP (ref 98–107)
CHLORIDE SERPL-SCNC: 108 MMOL/L — HIGH (ref 98–107)
CO2 SERPL-SCNC: 22 MMOL/L — SIGNIFICANT CHANGE UP (ref 22–31)
CO2 SERPL-SCNC: 22 MMOL/L — SIGNIFICANT CHANGE UP (ref 22–31)
CREAT SERPL-MCNC: 0.94 MG/DL — SIGNIFICANT CHANGE UP (ref 0.5–1.3)
CREAT SERPL-MCNC: 0.99 MG/DL — SIGNIFICANT CHANGE UP (ref 0.5–1.3)
GLUCOSE SERPL-MCNC: 174 MG/DL — HIGH (ref 70–99)
GLUCOSE SERPL-MCNC: 200 MG/DL — HIGH (ref 70–99)
HCT VFR BLD CALC: 21.8 % — LOW (ref 34.5–45)
HGB BLD-MCNC: 7.1 G/DL — LOW (ref 11.5–15.5)
MAGNESIUM SERPL-MCNC: 2.2 MG/DL — SIGNIFICANT CHANGE UP (ref 1.6–2.6)
MAGNESIUM SERPL-MCNC: 2.2 MG/DL — SIGNIFICANT CHANGE UP (ref 1.6–2.6)
MCHC RBC-ENTMCNC: 27.7 PG — SIGNIFICANT CHANGE UP (ref 27–34)
MCHC RBC-ENTMCNC: 32.6 % — SIGNIFICANT CHANGE UP (ref 32–36)
MCV RBC AUTO: 85.2 FL — SIGNIFICANT CHANGE UP (ref 80–100)
METHOD TYPE: SIGNIFICANT CHANGE UP
NRBC # FLD: 0 — SIGNIFICANT CHANGE UP
ORGANISM # SPEC MICROSCOPIC CNT: SIGNIFICANT CHANGE UP
PHOSPHATE SERPL-MCNC: 2.9 MG/DL — SIGNIFICANT CHANGE UP (ref 2.5–4.5)
PHOSPHATE SERPL-MCNC: 3.2 MG/DL — SIGNIFICANT CHANGE UP (ref 2.5–4.5)
PLATELET # BLD AUTO: 72 K/UL — LOW (ref 150–400)
PMV BLD: 12.5 FL — SIGNIFICANT CHANGE UP (ref 7–13)
POTASSIUM SERPL-MCNC: 4.5 MMOL/L — SIGNIFICANT CHANGE UP (ref 3.5–5.3)
POTASSIUM SERPL-MCNC: 4.5 MMOL/L — SIGNIFICANT CHANGE UP (ref 3.5–5.3)
POTASSIUM SERPL-SCNC: 4.5 MMOL/L — SIGNIFICANT CHANGE UP (ref 3.5–5.3)
POTASSIUM SERPL-SCNC: 4.5 MMOL/L — SIGNIFICANT CHANGE UP (ref 3.5–5.3)
RBC # BLD: 2.56 M/UL — LOW (ref 3.8–5.2)
RBC # FLD: 15.3 % — HIGH (ref 10.3–14.5)
SODIUM SERPL-SCNC: 138 MMOL/L — SIGNIFICANT CHANGE UP (ref 135–145)
SODIUM SERPL-SCNC: 140 MMOL/L — SIGNIFICANT CHANGE UP (ref 135–145)
SPECIMEN SOURCE: SIGNIFICANT CHANGE UP
SPECIMEN SOURCE: SIGNIFICANT CHANGE UP
WBC # BLD: 7.16 K/UL — SIGNIFICANT CHANGE UP (ref 3.8–10.5)
WBC # FLD AUTO: 7.16 K/UL — SIGNIFICANT CHANGE UP (ref 3.8–10.5)

## 2017-12-25 PROCEDURE — 99232 SBSQ HOSP IP/OBS MODERATE 35: CPT

## 2017-12-25 PROCEDURE — 99233 SBSQ HOSP IP/OBS HIGH 50: CPT | Mod: GC

## 2017-12-25 RX ADMIN — Medication 3 MILLIGRAM(S): at 00:30

## 2017-12-25 RX ADMIN — PANTOPRAZOLE SODIUM 40 MILLIGRAM(S): 20 TABLET, DELAYED RELEASE ORAL at 08:04

## 2017-12-25 RX ADMIN — SODIUM CHLORIDE 125 MILLILITER(S): 9 INJECTION, SOLUTION INTRAVENOUS at 10:01

## 2017-12-25 RX ADMIN — BUDESONIDE AND FORMOTEROL FUMARATE DIHYDRATE 2 PUFF(S): 160; 4.5 AEROSOL RESPIRATORY (INHALATION) at 10:55

## 2017-12-25 RX ADMIN — HYDROMORPHONE HYDROCHLORIDE 1 MILLIGRAM(S): 2 INJECTION INTRAMUSCULAR; INTRAVENOUS; SUBCUTANEOUS at 21:07

## 2017-12-25 RX ADMIN — HYDROMORPHONE HYDROCHLORIDE 1 MILLIGRAM(S): 2 INJECTION INTRAMUSCULAR; INTRAVENOUS; SUBCUTANEOUS at 12:36

## 2017-12-25 RX ADMIN — Medication 325 MILLIGRAM(S): at 05:14

## 2017-12-25 RX ADMIN — HYDROMORPHONE HYDROCHLORIDE 1 MILLIGRAM(S): 2 INJECTION INTRAMUSCULAR; INTRAVENOUS; SUBCUTANEOUS at 21:22

## 2017-12-25 RX ADMIN — Medication 50 MILLIGRAM(S): at 05:14

## 2017-12-25 RX ADMIN — Medication 50 MILLIGRAM(S): at 17:42

## 2017-12-25 RX ADMIN — Medication 50 MILLIGRAM(S): at 12:28

## 2017-12-25 RX ADMIN — HYDROMORPHONE HYDROCHLORIDE 1 MILLIGRAM(S): 2 INJECTION INTRAMUSCULAR; INTRAVENOUS; SUBCUTANEOUS at 12:51

## 2017-12-25 RX ADMIN — Medication 50 MILLIGRAM(S): at 00:16

## 2017-12-25 RX ADMIN — BUDESONIDE AND FORMOTEROL FUMARATE DIHYDRATE 2 PUFF(S): 160; 4.5 AEROSOL RESPIRATORY (INHALATION) at 21:52

## 2017-12-25 RX ADMIN — CEFTRIAXONE 100 GRAM(S): 500 INJECTION, POWDER, FOR SOLUTION INTRAMUSCULAR; INTRAVENOUS at 17:42

## 2017-12-25 RX ADMIN — Medication 50 MILLIGRAM(S): at 23:55

## 2017-12-25 RX ADMIN — Medication 325 MILLIGRAM(S): at 17:42

## 2017-12-25 NOTE — DIETITIAN INITIAL EVALUATION ADULT. - PROBLEM SELECTOR PLAN 2
- etiology multifactorial including poor exercise tolerance vs. asthma  - heart failure not clinically apparent, and recent TTE shows normal LV systolic function with preserved EF  - cont Symbicort BID (therapeutic interchange)

## 2017-12-25 NOTE — PROGRESS NOTE ADULT - SUBJECTIVE AND OBJECTIVE BOX
Pt seen and examined.  Reports pain well controlled  Off pressors  1 unit transfused yesterday    T(C): 37.2 (12-25-17 @ 04:00), Max: 37.2 (12-25-17 @ 04:00)  HR: 70 (12-25-17 @ 06:00) (69 - 98)  BP: 97/72 (12-24-17 @ 19:00) (63/28 - 112/32)  RR: 20 (12-25-17 @ 06:00) (16 - 26)  SpO2: 96% (12-25-17 @ 06:00) (78% - 100%)  Wt(kg): --                          7.1    7.16  )-----------( 72       ( 25 Dec 2017 02:25 )             21.8     12-25    138  |  106  |  15  ----------------------------<  200<H>  4.5   |  22  |  0.99    Ca    7.4<L>      25 Dec 2017 02:25  Phos  3.2     12-25  Mg     2.2     12-25      PT/INR - ( 24 Dec 2017 14:47 )   PT: 12.4 SEC;   INR: 1.08          PTT - ( 24 Dec 2017 14:47 )  PTT:31.8 SEC    NAD  RLE in BJKI dressing, C/D/I  EHL/FHL/TA/GS intact  SILT SP/DP/T/S/S  WWP brisk cap refill    77F s/p stage I revision of infected R TKA  - Post-op fluid resuscitation and pressor support as per SICU  - Transfuse as needed  - TTWB RLE in KI at all times  - Custom [place] knee brace 0-90 deg ordered, once arrives can swap out KI and remain TTWB with [place] brace on at all times  -  mg BID for DVT ppx  - Hold Plavix x72 hours (may resume 12/26 at 12pm)  - FU ID recs  - FU OR Cx and pathology (GPC, fu speciation)  - Daily BCx until neg for eventual PICC placement  - SICU care appreciated.

## 2017-12-25 NOTE — PROGRESS NOTE ADULT - SUBJECTIVE AND OBJECTIVE BOX
SICU Daily Progress Note  =====================================================  Interval/Overnight Events:  Patient was given a unit of pRBC yesterday morning. Her response was less than what was expected, however her H/H remained stable and she did not require vasopressor support. She was started on stress dose steroids. She was switched to LR. She was given a left radial arterial line. Repeat blood cultures were sent. She was maintained on ceftriaxone antibiotics; Zosyn was discontinued.      HPI: 77y female s/p BL total knee replacement presented with infected right knee replacement. Blood cultures positive for strep sp. Underwent explantation of R knee hardware with washout and spacer placement. Patient had significant blood loss and required massive resuscitation. Patient extubated at conclusion of procedure. SICU consulted for continued post-operative resuscitation. Patient maintained on low dose phenylephrine gtt intra-operatively. Admitted to SICU for resuscitation and close monitoring.     Allergies: IV Contrast (Flushing (Skin); Nausea)  No Known Allergies      MEDICATIONS:   --------------------------------------------------------------------------------------  Neurologic Medications  acetaminophen   Tablet 650 milliGRAM(s) Oral every 6 hours PRN For Temp greater than 38 C (100.4 F)  acetaminophen   Tablet. 650 milliGRAM(s) Oral every 6 hours PRN Mild Pain (1 - 3)  aspirin 325 milliGRAM(s) Oral every 12 hours  HYDROmorphone  Injectable 1 milliGRAM(s) IV Push every 6 hours PRN Moderate Pain (4 - 6)  melatonin 3 milliGRAM(s) Oral at bedtime PRN Insomnia    Respiratory Medications  buDESOnide 160 MICROgram(s)/formoterol 4.5 MICROgram(s) Inhaler 2 Puff(s) Inhalation two times a day    Cardiovascular Medications  phenylephrine    Infusion 0.9 MICROgram(s)/kG/Min IV Continuous <Continuous>    Gastrointestinal Medications  docusate sodium 100 milliGRAM(s) Oral two times a day  lactated ringers. 1000 milliLiter(s) IV Continuous <Continuous>  pantoprazole    Tablet 40 milliGRAM(s) Oral before breakfast    Genitourinary Medications    Hematologic/Oncologic Medications    Antimicrobial/Immunologic Medications  cefTRIAXone   IVPB      cefTRIAXone   IVPB 2 Gram(s) IV Intermittent every 24 hours    Endocrine/Metabolic Medications  hydrocortisone sodium succinate Injectable 50 milliGRAM(s) IV Push every 6 hours  simvastatin 20 milliGRAM(s) Oral at bedtime    Topical/Other Medications  nystatin Powder 1 Application(s) Topical two times a day PRN rash    --------------------------------------------------------------------------------------    VITAL SIGNS, INS/OUTS (last 24 hours):  --------------------------------------------------------------------------------------  T(C): 36.8 (12-25-17 @ 00:00), Max: 37 (12-24-17 @ 08:00)  HR: 75 (12-25-17 @ 00:00) (67 - 98)  BP: 97/72 (12-24-17 @ 19:00) (63/28 - 118/32)  RR: 20 (12-25-17 @ 00:00) (13 - 26)  SpO2: 94% (12-25-17 @ 00:00) (73% - 100%)  Wt(kg): --  12-23 @ 07:01  -  12-24 @ 07:00  --------------------------------------------------------  IN:    Albumin 5%  - 250 mL: 500 mL    IV PiggyBack: 450 mL    Oral Fluid: 300 mL    Packed Red Blood Cells: 300 mL    phenylephrine   Infusion: 71.2 mL    phenylephrine   Infusion: 22.1 mL    sodium chloride 0.9%: 1750 mL    sodium chloride 0.9%: 100 mL  Total IN: 3493.3 mL    OUT:    Indwelling Catheter - Urethral: 780 mL  Total OUT: 780 mL    Total NET: 2713.3 mL      12-24 @ 07:01  -  12-25 @ 00:39  --------------------------------------------------------  IN:    IV PiggyBack: 150 mL    lactated ringers.: 1500 mL    sodium chloride 0.9%: 375 mL  Total IN: 2025 mL    OUT:    Indwelling Catheter - Urethral: 700 mL  Total OUT: 700 mL    Total NET: 1325 mL        --------------------------------------------------------------------------------------    EXAM  EXAM  NEUROLOGY  RASS: 0  	GCS:  15  Exam: Alert, follows commands. No focal deficits.    HEENT  Exam: Normocephalic, atraumatic.  EOMI    RESPIRATORY  Exam: Lungs clear to auscultation    CARDIOVASCULAR  Exam: S1, S2.  Regular rate and rhythm    GI/NUTRITION  Exam: Abdomen soft, Non-tender, Non-distended.    VASCULAR  Exam: RLE wrapped in ace and covered with a brace. + pulse, motor, and sensation distally. Extremities warm, pink, well-perfused.      METABOLIC/FLUIDS/ELECTROLYTES  lactated ringers. 1000 milliLiter(s) IV Continuous <Continuous>      HEMATOLOGIC  [x] VTE Prophylaxis:   Transfusions:	[x] PRBC x 1	[] Platelets		[] FFP	[] Cryoprecipitate    INFECTIOUS DISEASE  Antimicrobials/Immunologic Medications:  cefTRIAXone   IVPB      cefTRIAXone   IVPB 2 Gram(s) IV Intermittent every 24 hours    Day #      of     ***    Tubes/Lines/Drains  ***  [x] Peripheral IV  [] Central Venous Line     	[] R	[] L	[] IJ	[] Fem	[] SC	Date Placed:   [x] Arterial Line		[] R	[x] L	[] Fem	[x] Rad	[] Ax	Date Placed: 12/24  [] PICC		[] Midline		[] Mediport  [x] Urinary Catheter		Date Placed:   [x] Necessity of urinary, arterial, and venous catheters discussed    LABS  --------------------------------------------------------------------------------------    --------------------------------------------------------------------------------------    OTHER LABORATORY:     IMAGING STUDIES:   CXR:     ASSESSMENT:  77y Female w/ infected Right knee hardware s/p explanation w/ spacer placement, bacteremia    PLAN:   Neurologic:   - Continue PRN dilaudid/IV tylenol for analgesia    Respiratory:  - Incentive spirometry, OOB    Cardiovascular:  - Continue to monitor BP, resume phenylephrine PRN  - Restart plavix POD #3 (12/26)  - Repeat TTE vs ERIKA for endocarditis workup.   - Continue lisinopril, zocor, ASA    Gastrointestinal/Nutrition:   - DASH diet  - Continue protonix, colace    Renal/Genitourinary:   - Continue main  - Trend BMP, replete electrolytes PRN    Hematologic:   -  BID for orthopedic surgery VTE ppx  - Will discuss additional VTE prophylaxis with primary team    Infectious Disease:   - Ceftriaxone, for bacteremia/septic joint  - f/u repeat cultures  - ID reccs appreciated    Tubes/Lines/Drains:  - PIV, lund  - Consider A line if BP doesn't improve/measurements unreliable    Endocrine:   - Monitor glucose on BMP  - continue stress dose steroids    Disposition: SICU

## 2017-12-25 NOTE — PROGRESS NOTE ADULT - SUBJECTIVE AND OBJECTIVE BOX
Cardiology/Vascular Medicine Inpatient Progress Note    POD #1 Stage 1 revision infected knee prosthesis  Off darin gtt now, hemodynamically stable  Clinically stable from cardiovascular perspective  May proceed with OR from our perspective  Continue IV abx (on Ceftriaxone, Zosyn), eventual PICC line when cultures are negative  On aspirin, recommend resuming clopidogrel when possible.    Vital Signs Last 24 Hrs  T(C): 37.2 (25 Dec 2017 04:00), Max: 37.2 (25 Dec 2017 04:00)  T(F): 98.9 (25 Dec 2017 04:00), Max: 98.9 (25 Dec 2017 04:00)  HR: 80 (25 Dec 2017 07:00) (69 - 98)  BP: 97/72 (24 Dec 2017 19:00) (63/28 - 112/32)  BP(mean): 77 (24 Dec 2017 19:00) (35 - 77)  RR: 18 (25 Dec 2017 07:00) (16 - 26)  SpO2: 98% (25 Dec 2017 07:00) (78% - 100%)    Appearance: Normal	  HEENT:   Normal oral mucosa, PERRL, EOMI	  Lymphatic: No lymphadenopathy  Cardiovascular: Normal S1 S2, No JVD, No murmurs, No edema  Respiratory: Lungs clear to auscultation	  Psychiatry: A & O x 3, Mood & affect appropriate  Gastrointestinal:  Soft, Non-tender, + BS	  Skin: No rashes, No ecchymoses, No cyanosis	  Neurologic: Non-focal  Extremities: Normal range of motion, No clubbing, cyanosis, +dressings      MEDICATIONS  (STANDING):  aspirin 325 milliGRAM(s) Oral every 12 hours  buDESOnide 160 MICROgram(s)/formoterol 4.5 MICROgram(s) Inhaler 2 Puff(s) Inhalation two times a day  cefTRIAXone   IVPB      cefTRIAXone   IVPB 2 Gram(s) IV Intermittent every 24 hours  docusate sodium 100 milliGRAM(s) Oral two times a day  hydrocortisone sodium succinate Injectable 50 milliGRAM(s) IV Push every 6 hours  lactated ringers. 1000 milliLiter(s) (125 mL/Hr) IV Continuous <Continuous>  pantoprazole    Tablet 40 milliGRAM(s) Oral before breakfast  phenylephrine    Infusion 0.9 MICROgram(s)/kG/Min (22.191 mL/Hr) IV Continuous <Continuous>  simvastatin 20 milliGRAM(s) Oral at bedtime    MEDICATIONS  (PRN):  acetaminophen   Tablet 650 milliGRAM(s) Oral every 6 hours PRN For Temp greater than 38 C (100.4 F)  acetaminophen   Tablet. 650 milliGRAM(s) Oral every 6 hours PRN Mild Pain (1 - 3)  HYDROmorphone  Injectable 1 milliGRAM(s) IV Push every 6 hours PRN Moderate Pain (4 - 6)  melatonin 3 milliGRAM(s) Oral at bedtime PRN Insomnia  nystatin Powder 1 Application(s) Topical two times a day PRN rash    LABS:	 	             Ca    6.8<L>      24 Dec 2017 03:34  Phos  4.4     12-24  Mg     1.6     12-24    MEDICATIONS  (STANDING):  aspirin 325 milliGRAM(s) Oral every 12 hours  buDESOnide 160 MICROgram(s)/formoterol 4.5 MICROgram(s) Inhaler 2 Puff(s) Inhalation two times a day  cefTRIAXone   IVPB      cefTRIAXone   IVPB 2 Gram(s) IV Intermittent every 24 hours  docusate sodium 100 milliGRAM(s) Oral two times a day  hydrocortisone sodium succinate Injectable 50 milliGRAM(s) IV Push every 6 hours  lactated ringers. 1000 milliLiter(s) (125 mL/Hr) IV Continuous <Continuous>  pantoprazole    Tablet 40 milliGRAM(s) Oral before breakfast  phenylephrine    Infusion 0.9 MICROgram(s)/kG/Min (22.191 mL/Hr) IV Continuous <Continuous>  simvastatin 20 milliGRAM(s) Oral at bedtime    MEDICATIONS  (PRN):  acetaminophen   Tablet 650 milliGRAM(s) Oral every 6 hours PRN For Temp greater than 38 C (100.4 F)  acetaminophen   Tablet. 650 milliGRAM(s) Oral every 6 hours PRN Mild Pain (1 - 3)  HYDROmorphone  Injectable 1 milliGRAM(s) IV Push every 6 hours PRN Moderate Pain (4 - 6)  melatonin 3 milliGRAM(s) Oral at bedtime PRN Insomnia  nystatin Powder 1 Application(s) Topical two times a day PRN rash    < from: Transthoracic Echocardiogram (12.19.17 @ 07:36) >  Patient name: IRASEMA KOHLER  YOB: 1940   Age: 77 (F)   MR#: [id number]  Study Date: 12/19/2017  Location: O/PSonographer: Lauren Finkelstein  Study quality: Technically good  Referring Physician: Roverto Roblero MD  Blood Pressure: 152/63 mmHg  Height: 160 cm  Weight: 120 kg  BSA: 2.2 m2  ------------------------------------------------------------------------  PROCEDURE: Transthoracic echocardiogram with 2-D, M-Mode  and complete spectral and color flow Doppler.  INDICATION: Atherosclerotic heart disease of native  coronary artery without angina pectoris (I25.10),  Unspecified diastolic (congestive) heart failure (I50.30)  ------------------------------------------------------------------------  DIMENSIONS:  Dimensions:     Normal Values:  LA:     3.5 cm    2.0 - 4.0 cm  Ao:     2.2 cm    2.0 - 3.8 cm  SEPTUM: 0.9 cm    0.6 - 1.2 cm  PWT:    0.8 cm    0.6 - 1.1 cm  LVIDd:  4.6 cm    3.0 - 5.6 cm  LVIDs:  2.6 cm    1.8 - 4.0 cm  Derived Variables:  LVMI: 59 g/m2  RWT: 0.34  Fractional short: 43 %  Ejection Fraction (Teicholtz): 75 %  ------------------------------------------------------------------------  OBSERVATIONS:  Mitral Valve: Mitral annular calcification and calcified  mitral leaflets with normal diastolic opening. Mild mitral  regurgitation.  Mean transmitral valve gradient equals 5 mm  Hg, consistent with moderate mitral stenosis.  Aortic Root: Normal aortic root.  Aortic Valve: TAVR noted. Peak transaortic valve gradient  equals 27 mm Hg, mean transaorticvalve gradient equals 13  mm Hg, which is probably normal in the presence of a  prosthetic valve. Minimal aortic regurgitation.  Left Atrium: Normal left atrium.  LA volume index = 31  cc/m2.  Left Ventricle: Normal left ventricular systolic function.  No segmental wall motion abnormalities. Normal left  ventricular internal dimensions and wall thicknesses.  Right Heart: Normal right atrium. Normal right ventricular  size and function. Normal tricuspid valve.  Mild tricuspid  regurgitation. Normal pulmonic valve. Minimal pulmonic  regurgitation.  Pericardium/PleuraNormal pericardium with no pericardial  effusion.  Hemodynamic: Estimated right ventricular systolic pressure  equals 41 mm Hg, assuming right atrial pressure equals 10  mm Hg, consistent with mild pulmonary hypertension.  ------------------------------------------------------------------------  CONCLUSIONS:  1. TAVR noted. Peak transaortic valve gradient equals 27 mm  Hg, mean transaortic valve gradient equals 13 mm Hg, which  is probably normal in the presence of a prosthetic valve.  Minimal aortic regurgitation.  2. Normal left ventricular internal dimensions and wall  thicknesses.  3. Normal left ventricular systolic function. No segmental  wall motion abnormalities.  4.Normal right ventricular size and function.  5. Normal tricuspid valve.  Mild tricuspid regurgitation.  6. Estimated pulmonary artery systolic pressure equals 41  mm Hg, assuming right atrial pressure equals 10  mm Hg,  consistent with mild pulmonaryhypertension.  ------------------------------------------------------------------------  Confirmed on  12/20/2017 - 07:16:23 by Roverto Roblero MD,  [facility], [place], [place]  ------------------------------------------------------------------------    < end of copied text > Cardiology/Vascular Medicine Inpatient Progress Note    POD #2 Stage 1 revision infected knee prosthesis  Hemodynamically stable  Clinically stable from cardiovascular perspective  Cultures NGTD  No need for ERIKA at this time  Continue IV abx   On aspirin, can change to aspirin 81 daily when resuming clopidogrel    Vital Signs Last 24 Hrs  T(C): 37.2 (25 Dec 2017 04:00), Max: 37.2 (25 Dec 2017 04:00)  T(F): 98.9 (25 Dec 2017 04:00), Max: 98.9 (25 Dec 2017 04:00)  HR: 80 (25 Dec 2017 07:00) (69 - 98)  BP: 97/72 (24 Dec 2017 19:00) (63/28 - 112/32)  BP(mean): 77 (24 Dec 2017 19:00) (35 - 77)  RR: 18 (25 Dec 2017 07:00) (16 - 26)  SpO2: 98% (25 Dec 2017 07:00) (78% - 100%)    Appearance: Normal	  HEENT:   Normal oral mucosa, PERRL, EOMI	  Lymphatic: No lymphadenopathy  Cardiovascular: Normal S1 S2, No JVD, No murmurs, No edema  Respiratory: Lungs clear to auscultation	  Psychiatry: A & O x 3, Mood & affect appropriate  Gastrointestinal:  Soft, Non-tender, + BS	  Skin: No rashes, No ecchymoses, No cyanosis	  Neurologic: Non-focal  Extremities: Normal range of motion, No clubbing, cyanosis, +dressings      MEDICATIONS  (STANDING):  aspirin 325 milliGRAM(s) Oral every 12 hours  buDESOnide 160 MICROgram(s)/formoterol 4.5 MICROgram(s) Inhaler 2 Puff(s) Inhalation two times a day  cefTRIAXone   IVPB      cefTRIAXone   IVPB 2 Gram(s) IV Intermittent every 24 hours  docusate sodium 100 milliGRAM(s) Oral two times a day  hydrocortisone sodium succinate Injectable 50 milliGRAM(s) IV Push every 6 hours  lactated ringers. 1000 milliLiter(s) (125 mL/Hr) IV Continuous <Continuous>  pantoprazole    Tablet 40 milliGRAM(s) Oral before breakfast  phenylephrine    Infusion 0.9 MICROgram(s)/kG/Min (22.191 mL/Hr) IV Continuous <Continuous>  simvastatin 20 milliGRAM(s) Oral at bedtime    MEDICATIONS  (PRN):  acetaminophen   Tablet 650 milliGRAM(s) Oral every 6 hours PRN For Temp greater than 38 C (100.4 F)  acetaminophen   Tablet. 650 milliGRAM(s) Oral every 6 hours PRN Mild Pain (1 - 3)  HYDROmorphone  Injectable 1 milliGRAM(s) IV Push every 6 hours PRN Moderate Pain (4 - 6)  melatonin 3 milliGRAM(s) Oral at bedtime PRN Insomnia  nystatin Powder 1 Application(s) Topical two times a day PRN rash    LABS:	 	             Ca    6.8<L>      24 Dec 2017 03:34  Phos  4.4     12-24  Mg     1.6     12-24    MEDICATIONS  (STANDING):  aspirin 325 milliGRAM(s) Oral every 12 hours  buDESOnide 160 MICROgram(s)/formoterol 4.5 MICROgram(s) Inhaler 2 Puff(s) Inhalation two times a day  cefTRIAXone   IVPB      cefTRIAXone   IVPB 2 Gram(s) IV Intermittent every 24 hours  docusate sodium 100 milliGRAM(s) Oral two times a day  hydrocortisone sodium succinate Injectable 50 milliGRAM(s) IV Push every 6 hours  lactated ringers. 1000 milliLiter(s) (125 mL/Hr) IV Continuous <Continuous>  pantoprazole    Tablet 40 milliGRAM(s) Oral before breakfast  phenylephrine    Infusion 0.9 MICROgram(s)/kG/Min (22.191 mL/Hr) IV Continuous <Continuous>  simvastatin 20 milliGRAM(s) Oral at bedtime    MEDICATIONS  (PRN):  acetaminophen   Tablet 650 milliGRAM(s) Oral every 6 hours PRN For Temp greater than 38 C (100.4 F)  acetaminophen   Tablet. 650 milliGRAM(s) Oral every 6 hours PRN Mild Pain (1 - 3)  HYDROmorphone  Injectable 1 milliGRAM(s) IV Push every 6 hours PRN Moderate Pain (4 - 6)  melatonin 3 milliGRAM(s) Oral at bedtime PRN Insomnia  nystatin Powder 1 Application(s) Topical two times a day PRN rash    < from: Transthoracic Echocardiogram (12.19.17 @ 07:36) >  Patient name: IRASEMA KOHLER  YOB: 1940   Age: 77 (F)   MR#: [id number]  Study Date: 12/19/2017  Location: O/PSonographer: Lauren Finkelstein  Study quality: Technically good  Referring Physician: Roverto Roblero MD  Blood Pressure: 152/63 mmHg  Height: 160 cm  Weight: 120 kg  BSA: 2.2 m2  ------------------------------------------------------------------------  PROCEDURE: Transthoracic echocardiogram with 2-D, M-Mode  and complete spectral and color flow Doppler.  INDICATION: Atherosclerotic heart disease of native  coronary artery without angina pectoris (I25.10),  Unspecified diastolic (congestive) heart failure (I50.30)  ------------------------------------------------------------------------  DIMENSIONS:  Dimensions:     Normal Values:  LA:     3.5 cm    2.0 - 4.0 cm  Ao:     2.2 cm    2.0 - 3.8 cm  SEPTUM: 0.9 cm    0.6 - 1.2 cm  PWT:    0.8 cm    0.6 - 1.1 cm  LVIDd:  4.6 cm    3.0 - 5.6 cm  LVIDs:  2.6 cm    1.8 - 4.0 cm  Derived Variables:  LVMI: 59 g/m2  RWT: 0.34  Fractional short: 43 %  Ejection Fraction (Teicholtz): 75 %  ------------------------------------------------------------------------  OBSERVATIONS:  Mitral Valve: Mitral annular calcification and calcified  mitral leaflets with normal diastolic opening. Mild mitral  regurgitation.  Mean transmitral valve gradient equals 5 mm  Hg, consistent with moderate mitral stenosis.  Aortic Root: Normal aortic root.  Aortic Valve: TAVR noted. Peak transaortic valve gradient  equals 27 mm Hg, mean transaorticvalve gradient equals 13  mm Hg, which is probably normal in the presence of a  prosthetic valve. Minimal aortic regurgitation.  Left Atrium: Normal left atrium.  LA volume index = 31  cc/m2.  Left Ventricle: Normal left ventricular systolic function.  No segmental wall motion abnormalities. Normal left  ventricular internal dimensions and wall thicknesses.  Right Heart: Normal right atrium. Normal right ventricular  size and function. Normal tricuspid valve.  Mild tricuspid  regurgitation. Normal pulmonic valve. Minimal pulmonic  regurgitation.  Pericardium/PleuraNormal pericardium with no pericardial  effusion.  Hemodynamic: Estimated right ventricular systolic pressure  equals 41 mm Hg, assuming right atrial pressure equals 10  mm Hg, consistent with mild pulmonary hypertension.  ------------------------------------------------------------------------  CONCLUSIONS:  1. TAVR noted. Peak transaortic valve gradient equals 27 mm  Hg, mean transaortic valve gradient equals 13 mm Hg, which  is probably normal in the presence of a prosthetic valve.  Minimal aortic regurgitation.  2. Normal left ventricular internal dimensions and wall  thicknesses.  3. Normal left ventricular systolic function. No segmental  wall motion abnormalities.  4.Normal right ventricular size and function.  5. Normal tricuspid valve.  Mild tricuspid regurgitation.  6. Estimated pulmonary artery systolic pressure equals 41  mm Hg, assuming right atrial pressure equals 10  mm Hg,  consistent with mild pulmonaryhypertension.  ------------------------------------------------------------------------  Confirmed on  12/20/2017 - 07:16:23 by Roverto Roblero MD,  [facility], [place], [place]  ------------------------------------------------------------------------    < end of copied text >

## 2017-12-25 NOTE — DIETITIAN INITIAL EVALUATION ADULT. - NS AS NUTRI INTERV MEALS SNACK
Other (specify)/Diets modified for specific foods and ingredients/1. Suggest: PO diet rx: DASH/TLC (cholesterol and sodium restricted), Low Fat;              2. Assist Pt with meals as needed; Monitor PO diet tolerance;             3. Recommend: Multivitamins & minerals 1 tab daily;              4. Suggest: To check HbA1c level;             5. Monitor labs, weights, hydration status;             6. Recommend: to follow up with weight management dietitians;

## 2017-12-25 NOTE — PROGRESS NOTE ADULT - ATTENDING COMMENTS
ICU Issues:  T84.53XD Infection of total right knee replacement, subsequent encounter   - most recent blood cultures negative  - continuing on broad spectrum abx   - HD stable  D64.9 Anemia, unspecified type   - stable H/H but at borderline as far as as what I would consider a transfusion threshold.  Will transfuse 1 unit PRBCs givern her cardiac history  I25.10 Coronary artery disease involving native coronary artery of native heart without angina pectoris   - on asa.  holding plavix until tomorrow as per ortho recs  Z91.89 At risk for malnutrition   - on diet

## 2017-12-25 NOTE — PROGRESS NOTE ADULT - ATTENDING COMMENTS
I agree with the above note and have personally seen and examined this patient. All pertinent films have been reviewed. Please refer to clinical documentation of the history, physical examinations, data summary, and both assessment and plan as documented above and with which I agree.    she is recovering well. dressing removed today on rounds. aquacell changed in sterile manner. wound c/d/i. no need for bulky wrap anymore. no further dressing changes to RLE. Keep KI in place until custom navarro placed tomorrow. Will be 0-90 rom, TTWB. Mobilize with PT. Okay to restart plavix tomorrow. Transfuse PRN. Plan for PICC placement as well as ERIKA to eval for cardiac valve vegetations.     Nate Bass MD  Attending Orthopedic Surgeon

## 2017-12-25 NOTE — PROCEDURE NOTE - PROCEDURE
<<-----Click on this checkbox to enter Procedure Arterial line placement  12/25/2017    Active  MARTA

## 2017-12-25 NOTE — PROCEDURE NOTE - NSPROCDETAILS_GEN_ALL_CORE
sutured in place/location identified, draped/prepped, sterile technique used, needle inserted/introduced/Seldinger technique/connected to a pressurized flush line/positive blood return obtained via catheter

## 2017-12-25 NOTE — DIETITIAN INITIAL EVALUATION ADULT. - PROBLEM SELECTOR PLAN 3
- possibly musculoskeletal vs. infectious etiology; see plan as noted above  - ambulate with assistance, fall risk protocol  - Percocet PRN for knee pain  - f/u Orthopaedic Surgery recs  - PT consult

## 2017-12-25 NOTE — DIETITIAN INITIAL EVALUATION ADULT. - OTHER INFO
Nutrition Consult X BMI greater than 40. Pt 78 yo female with S/P R knee washout, antibiotic spacer placement. Pt appears alert, oriented. Per Pt her appetite usually fair. No chew/swallow problem voiced; no nausea/vomiting/diarrhea reported @ present. Per Pt her body weight (PTA): ~265#. Pt also stated she lost weight: ~30# in 2 years, intentionally. Weight management nutrition therapy discussed with Pt including better food choices, foods to avoid, low calorie meal plans; written materials on diet also provided. RDN answered questions/concerns related to diet; RDN remains available, Pt made aware.

## 2017-12-25 NOTE — DIETITIAN INITIAL EVALUATION ADULT. - PROBLEM SELECTOR PLAN 1
- associated with markedly elevated inflammatory markers (ESR 74, ), concerning for septic arthritis vs. endocarditis vs. other occult infection; osteomyelitis, temporal arteritis not clinically likely   - right knee evaluated by Orthopaedic Surgery; no indication for urgent intervention i.e. arthrocentesis at this time, will continue to monitor  - patient received antibiotic prophylaxis prior to dental procedures with recent unremarkable TTE so endocarditis less likely  - cont medical management with Zosyn q8, vancomycin q12  - monitor fever curve; Tylenol PRN  - US of right knee  - f/u Orthopaedic Surgery recs i.e. arthrocentesis pending US right knee   - f/u blood cultures

## 2017-12-25 NOTE — PROGRESS NOTE ADULT - ASSESSMENT
History of AS s/p TAVR  Normal LV function  Mild pulmonary HTN  Probable diastolic dysfunction  Morbid obesity, HTN, hyperlipidemia  Stable from the cardiovascular perspective.    POD #1 Stage 1 revision infected knee prosthesis  Receiving IV abx  Eventual PICC when blood cultures clear  On aspirin, resume clopidogrel when able. History of AS s/p TAVR  Normal LV function  Mild pulmonary HTN  Probable diastolic dysfunction  Morbid obesity, HTN, hyperlipidemia  Stable from the cardiovascular perspective.    POD #2 Stage 1 revision infected knee prosthesis  Hemodynamically stable  Clinically stable from cardiovascular perspective  Cultures NGTD  No need for ERIKA at this time  Continue IV abx   On aspirin, can change to aspirin 81 daily when resuming clopidogrel

## 2017-12-26 ENCOUNTER — APPOINTMENT (OUTPATIENT)
Dept: PULMONOLOGY | Facility: CLINIC | Age: 77
End: 2017-12-26

## 2017-12-26 DIAGNOSIS — M00.9 PYOGENIC ARTHRITIS, UNSPECIFIED: ICD-10-CM

## 2017-12-26 DIAGNOSIS — M35.3 POLYMYALGIA RHEUMATICA: ICD-10-CM

## 2017-12-26 LAB
-  CEFTRIAXONE: SIGNIFICANT CHANGE UP
-  CLINDAMYCIN: SIGNIFICANT CHANGE UP
-  ERYTHROMYCIN: SIGNIFICANT CHANGE UP
-  PENICILLIN G: SIGNIFICANT CHANGE UP
-  VANCOMYCIN: SIGNIFICANT CHANGE UP
BACTERIA FLD CULT: SIGNIFICANT CHANGE UP
BUN SERPL-MCNC: 25 MG/DL — HIGH (ref 7–23)
CALCIUM SERPL-MCNC: 7.6 MG/DL — LOW (ref 8.4–10.5)
CHLORIDE SERPL-SCNC: 104 MMOL/L — SIGNIFICANT CHANGE UP (ref 98–107)
CO2 SERPL-SCNC: 23 MMOL/L — SIGNIFICANT CHANGE UP (ref 22–31)
CREAT SERPL-MCNC: 1.09 MG/DL — SIGNIFICANT CHANGE UP (ref 0.5–1.3)
GLUCOSE SERPL-MCNC: 176 MG/DL — HIGH (ref 70–99)
GRAM STN FLD: SIGNIFICANT CHANGE UP
HCT VFR BLD CALC: 23.8 % — LOW (ref 34.5–45)
HGB BLD-MCNC: 7.8 G/DL — LOW (ref 11.5–15.5)
MAGNESIUM SERPL-MCNC: 2.3 MG/DL — SIGNIFICANT CHANGE UP (ref 1.6–2.6)
MCHC RBC-ENTMCNC: 27.6 PG — SIGNIFICANT CHANGE UP (ref 27–34)
MCHC RBC-ENTMCNC: 32.8 % — SIGNIFICANT CHANGE UP (ref 32–36)
MCV RBC AUTO: 84.1 FL — SIGNIFICANT CHANGE UP (ref 80–100)
METHOD TYPE: SIGNIFICANT CHANGE UP
NRBC # FLD: 0 — SIGNIFICANT CHANGE UP
ORGANISM # SPEC MICROSCOPIC CNT: SIGNIFICANT CHANGE UP
ORGANISM # SPEC MICROSCOPIC CNT: SIGNIFICANT CHANGE UP
PHOSPHATE SERPL-MCNC: 3.1 MG/DL — SIGNIFICANT CHANGE UP (ref 2.5–4.5)
PLATELET # BLD AUTO: 109 K/UL — LOW (ref 150–400)
PMV BLD: 12.8 FL — SIGNIFICANT CHANGE UP (ref 7–13)
POTASSIUM SERPL-MCNC: 4.6 MMOL/L — SIGNIFICANT CHANGE UP (ref 3.5–5.3)
POTASSIUM SERPL-SCNC: 4.6 MMOL/L — SIGNIFICANT CHANGE UP (ref 3.5–5.3)
RBC # BLD: 2.83 M/UL — LOW (ref 3.8–5.2)
RBC # FLD: 15.5 % — HIGH (ref 10.3–14.5)
SODIUM SERPL-SCNC: 138 MMOL/L — SIGNIFICANT CHANGE UP (ref 135–145)
SPECIMEN SOURCE: SIGNIFICANT CHANGE UP
WBC # BLD: 8.15 K/UL — SIGNIFICANT CHANGE UP (ref 3.8–10.5)
WBC # FLD AUTO: 8.15 K/UL — SIGNIFICANT CHANGE UP (ref 3.8–10.5)

## 2017-12-26 PROCEDURE — 99232 SBSQ HOSP IP/OBS MODERATE 35: CPT

## 2017-12-26 PROCEDURE — 99233 SBSQ HOSP IP/OBS HIGH 50: CPT

## 2017-12-26 RX ORDER — HEPARIN SODIUM 5000 [USP'U]/ML
5000 INJECTION INTRAVENOUS; SUBCUTANEOUS EVERY 8 HOURS
Qty: 0 | Refills: 0 | Status: DISCONTINUED | OUTPATIENT
Start: 2017-12-26 | End: 2018-01-02

## 2017-12-26 RX ORDER — HYDROCORTISONE 20 MG
50 TABLET ORAL
Qty: 0 | Refills: 0 | Status: COMPLETED | OUTPATIENT
Start: 2017-12-26 | End: 2017-12-26

## 2017-12-26 RX ORDER — HYDROCORTISONE 20 MG
50 TABLET ORAL
Qty: 0 | Refills: 0 | Status: DISCONTINUED | OUTPATIENT
Start: 2017-12-26 | End: 2017-12-26

## 2017-12-26 RX ORDER — CLOPIDOGREL BISULFATE 75 MG/1
75 TABLET, FILM COATED ORAL DAILY
Qty: 0 | Refills: 0 | Status: DISCONTINUED | OUTPATIENT
Start: 2017-12-26 | End: 2018-01-02

## 2017-12-26 RX ADMIN — HEPARIN SODIUM 5000 UNIT(S): 5000 INJECTION INTRAVENOUS; SUBCUTANEOUS at 15:54

## 2017-12-26 RX ADMIN — HYDROMORPHONE HYDROCHLORIDE 1 MILLIGRAM(S): 2 INJECTION INTRAMUSCULAR; INTRAVENOUS; SUBCUTANEOUS at 11:26

## 2017-12-26 RX ADMIN — BUDESONIDE AND FORMOTEROL FUMARATE DIHYDRATE 2 PUFF(S): 160; 4.5 AEROSOL RESPIRATORY (INHALATION) at 22:00

## 2017-12-26 RX ADMIN — Medication 50 MILLIGRAM(S): at 06:15

## 2017-12-26 RX ADMIN — HEPARIN SODIUM 5000 UNIT(S): 5000 INJECTION INTRAVENOUS; SUBCUTANEOUS at 21:42

## 2017-12-26 RX ADMIN — PANTOPRAZOLE SODIUM 40 MILLIGRAM(S): 20 TABLET, DELAYED RELEASE ORAL at 06:15

## 2017-12-26 RX ADMIN — Medication 50 MILLIGRAM(S): at 17:28

## 2017-12-26 RX ADMIN — Medication 3 MILLIGRAM(S): at 00:29

## 2017-12-26 RX ADMIN — HYDROMORPHONE HYDROCHLORIDE 1 MILLIGRAM(S): 2 INJECTION INTRAMUSCULAR; INTRAVENOUS; SUBCUTANEOUS at 11:40

## 2017-12-26 RX ADMIN — Medication 325 MILLIGRAM(S): at 17:28

## 2017-12-26 RX ADMIN — CLOPIDOGREL BISULFATE 75 MILLIGRAM(S): 75 TABLET, FILM COATED ORAL at 11:57

## 2017-12-26 RX ADMIN — Medication 100 MILLIGRAM(S): at 17:28

## 2017-12-26 RX ADMIN — HYDROMORPHONE HYDROCHLORIDE 1 MILLIGRAM(S): 2 INJECTION INTRAMUSCULAR; INTRAVENOUS; SUBCUTANEOUS at 21:42

## 2017-12-26 RX ADMIN — CEFTRIAXONE 100 GRAM(S): 500 INJECTION, POWDER, FOR SOLUTION INTRAMUSCULAR; INTRAVENOUS at 17:29

## 2017-12-26 RX ADMIN — HYDROMORPHONE HYDROCHLORIDE 1 MILLIGRAM(S): 2 INJECTION INTRAMUSCULAR; INTRAVENOUS; SUBCUTANEOUS at 22:00

## 2017-12-26 RX ADMIN — BUDESONIDE AND FORMOTEROL FUMARATE DIHYDRATE 2 PUFF(S): 160; 4.5 AEROSOL RESPIRATORY (INHALATION) at 08:13

## 2017-12-26 RX ADMIN — Medication 50 MILLIGRAM(S): at 11:50

## 2017-12-26 RX ADMIN — Medication 325 MILLIGRAM(S): at 06:15

## 2017-12-26 NOTE — PROGRESS NOTE ADULT - SUBJECTIVE AND OBJECTIVE BOX
CC: f/u for S sanguinis bacteremia, R knee PJI    Patient reports no new c/o    REVIEW OF SYSTEMS:  All other review of systems negative (Comprehensive ROS)    Antimicrobials POD # 3  cefTRIAXone   IVPB      cefTRIAXone   IVPB 2 Gram(s) IV Intermittent every 24 hours    Other Medications Reviewed    Vital Signs Last 24 Hrs  T(C): 36.7 (26 Dec 2017 13:27), Max: 36.7 (26 Dec 2017 13:27)  T(F): 98.1 (26 Dec 2017 13:27), Max: 98.1 (26 Dec 2017 13:27)  HR: 59 (26 Dec 2017 13:27) (59 - 76)  BP: 115/69 (26 Dec 2017 13:27) (115/69 - 115/69)  BP(mean): --  RR: 18 (26 Dec 2017 13:27) (15 - 21)  SpO2: 95% (26 Dec 2017 13:27) (93% - 100%)    PHYSICAL EXAM:  General: alert, no acute distress  Eyes:  anicteric, no conjunctival injection, no discharge  Oropharynx: no lesions or injection 	  Neck: supple, without adenopathy  Lungs: clear to auscultation  Heart: regular rate and rhythm; soft systolic murmur  Abdomen: soft, nondistended, nontender, without mass or organomegaly  Skin: no lesions  Extremities: R knee dressings intact  Neurologic: alert, oriented, moves all extremities    LAB RESULTS:                                   7.8    8.15  )-----------( 109      ( 26 Dec 2017 02:37 )             23.8   12-26    138  |  104  |  25<H>  ----------------------------<  176<H>  4.6   |  23  |  1.09    Ca    7.6<L>      26 Dec 2017 02:37  Phos  3.1     12-26  Mg     2.3     12-26          MICROBIOLOGY REVIEWED  RADIOLOGY REVIEWED

## 2017-12-26 NOTE — PROGRESS NOTE ADULT - SUBJECTIVE AND OBJECTIVE BOX
Cardiology/Vascular Medicine Inpatient Progress Note    s/p Stage 1 revision infected knee prosthesis  Clinically stable from cardiovascular perspective  Blood cultures NGTD  From our perspective, no current need for ERIKA  On IV Ceftriaxone per ID  On aspirin, can reduce to 81 mg daily  Recommend resuming clopidogrel when possible.    Vital Signs Last 24 Hrs  T(C): 36.2 (26 Dec 2017 08:00), Max: 37.2 (25 Dec 2017 16:00)  T(F): 97.2 (26 Dec 2017 08:00), Max: 99 (25 Dec 2017 16:00)  HR: 61 (26 Dec 2017 10:00) (60 - 76)  RR: 18 (26 Dec 2017 10:00) (15 - 21)  SpO2: 99% (26 Dec 2017 10:00) (93% - 100%)    Appearance: Normal	  HEENT:   Normal oral mucosa, PERRL, EOMI	  Lymphatic: No lymphadenopathy  Cardiovascular: Normal S1 S2, No JVD, No murmurs, No edema  Respiratory: Lungs clear to auscultation	  Psychiatry: A & O x 3, Mood & affect appropriate  Gastrointestinal:  Soft, Non-tender, + BS	  Skin: No rashes, No ecchymoses, No cyanosis	  Neurologic: Non-focal  Extremities: Normal range of motion, No clubbing, cyanosis, +dressings    MEDICATIONS  (STANDING):  aspirin 325 milliGRAM(s) Oral every 12 hours  buDESOnide 160 MICROgram(s)/formoterol 4.5 MICROgram(s) Inhaler 2 Puff(s) Inhalation two times a day  cefTRIAXone   IVPB      cefTRIAXone   IVPB 2 Gram(s) IV Intermittent every 24 hours  clopidogrel Tablet 75 milliGRAM(s) Oral daily  docusate sodium 100 milliGRAM(s) Oral two times a day  heparin  Injectable 5000 Unit(s) SubCutaneous every 8 hours  hydrocortisone sodium succinate Injectable 50 milliGRAM(s) IV Push <User Schedule>  pantoprazole    Tablet 40 milliGRAM(s) Oral before breakfast  simvastatin 20 milliGRAM(s) Oral at bedtime    MEDICATIONS  (PRN):  acetaminophen   Tablet 650 milliGRAM(s) Oral every 6 hours PRN For Temp greater than 38 C (100.4 F)  acetaminophen   Tablet. 650 milliGRAM(s) Oral every 6 hours PRN Mild Pain (1 - 3)  HYDROmorphone  Injectable 1 milliGRAM(s) IV Push every 6 hours PRN Moderate Pain (4 - 6)  melatonin 3 milliGRAM(s) Oral at bedtime PRN Insomnia  nystatin Powder 1 Application(s) Topical two times a day PRN rash      LABS:	 	                          7.8    8.15  )-----------( 109      ( 26 Dec 2017 02:37 )             23.8   12-26    138  |  104  |  25<H>  ----------------------------<  176<H>  4.6   |  23  |  1.09    Ca    7.6<L>      26 Dec 2017 02:37  Phos  3.1     12-26  Mg     2.3     12-26               < from: Transthoracic Echocardiogram (12.19.17 @ 07:36) >  Patient name: IRASEMA KOHLER  YOB: 1940   Age: 77 (F)   MR#: [id number]  Study Date: 12/19/2017  Location: O/PSonographer: Lauren Finkelstein  Study quality: Technically good  Referring Physician: Roverto Roblero MD  Blood Pressure: 152/63 mmHg  Height: 160 cm  Weight: 120 kg  BSA: 2.2 m2  ------------------------------------------------------------------------  PT/INR - ( 24 Dec 2017 14:47 )   PT: 12.4 SEC;   INR: 1.08          PTT - ( 24 Dec 2017 14:47 )  PTT:31.8 SECPROCEDURE: Transthoracic echocardiogram with 2-D, M-Mode  and complete spectral and color flow Doppler.  INDICATION: Atherosclerotic heart disease of native  coronary artery without angina pectoris (I25.10),  Unspecified diastolic (congestive) heart failure (I50.30)  ------------------------------------------------------------------------  DIMENSIONS:  Dimensions:     Normal Values:  LA:     3.5 cm    2.0 - 4.0 cm  Ao:     2.2 cm    2.0 - 3.8 cm  SEPTUM: 0.9 cm    0.6 - 1.2 cm  PWT:    0.8 cm    0.6 - 1.1 cm  LVIDd:  4.6 cm    3.0 - 5.6 cm  LVIDs:  2.6 cm    1.8 - 4.0 cm  Derived Variables:  LVMI: 59 g/m2  RWT: 0.34  Fractional short: 43 %  Ejection Fraction (Teicholtz): 75 %  ------------------------------------------------------------------------  OBSERVATIONS:  Mitral Valve: Mitral annular calcification and calcified  mitral leaflets with normal diastolic opening. Mild mitral  regurgitation.  Mean transmitral valve gradient equals 5 mm  Hg, consistent with moderate mitral stenosis.  Aortic Root: Normal aortic root.  Aortic Valve: TAVR noted. Peak transaortic valve gradient  equals 27 mm Hg, mean transaorticvalve gradient equals 13  mm Hg, which is probably normal in the presence of a  prosthetic valve. Minimal aortic regurgitation.  Left Atrium: Normal left atrium.  LA volume index = 31  cc/m2.  Left Ventricle: Normal left ventricular systolic function.  No segmental wall motion abnormalities. Normal left  ventricular internal dimensions and wall thicknesses.  Right Heart: Normal right atrium. Normal right ventricular  size and function. Normal tricuspid valve.  Mild tricuspid  regurgitation. Normal pulmonic valve. Minimal pulmonic  regurgitation.  Pericardium/PleuraNormal pericardium with no pericardial  effusion.  Hemodynamic: Estimated right ventricular systolic pressure  equals 41 mm Hg, assuming right atrial pressure equals 10  mm Hg, consistent with mild pulmonary hypertension.  ------------------------------------------------------------------------  CONCLUSIONS:  1. TAVR noted. Peak transaortic valve gradient equals 27 mm  Hg, mean transaortic valve gradient equals 13 mm Hg, which  is probably normal in the presence of a prosthetic valve.  Minimal aortic regurgitation.  2. Normal left ventricular internal dimensions and wall  thicknesses.  3. Normal left ventricular systolic function. No segmental  wall motion abnormalities.  4.Normal right ventricular size and function.  5. Normal tricuspid valve.  Mild tricuspid regurgitation.  6. Estimated pulmonary artery systolic pressure equals 41  mm Hg, assuming right atrial pressure equals 10  mm Hg,  consistent with mild pulmonaryhypertension.  ------------------------------------------------------------------------  Confirmed on  12/20/2017 - 07:16:23 by Roverto Roblero MD,  [facility], [place]  ------------------------------------------------------------------------    < end of copied text >

## 2017-12-26 NOTE — PROGRESS NOTE ADULT - ATTENDING COMMENTS
I agree with the above note and have personally seen and examined this patient. All pertinent films have been reviewed. Please refer to clinical documentation of the history, physical examinations, data summary, and both assessment and plan as documented above and with which I agree.    needs PICC, bcx negative  OOB, PT/OT    Nate Bass MD  Attending Orthopedic Surgeon

## 2017-12-26 NOTE — PROGRESS NOTE ADULT - SUBJECTIVE AND OBJECTIVE BOX
INTERVAL/OVERNIGHT EVENTS:      No overnight events. Pt reports moderate pain control today. She is maintaining good urinary output and normal PO intake. Reports loose stools but not poornima diarrhea.     HPI: 77y female s/p BL total knee replacement presented with infected right knee replacement. Blood cultures positive for strep sp. Underwent explantation of R knee hardware with washout and spacer placement. Patient had significant blood loss and required massive resuscitation. Patient extubated at conclusion of procedure. SICU consulted for continued post-operative resuscitation. Patient maintained on low dose phenylephrine gtt intra-operatively. Admitted to SICU for resuscitation and close monitoring. Transferred back to medicine service once stable on 12/26.     Allergies: IV Contrast (Flushing (Skin); Nausea)  No Known Allergies      MEDICATIONS  (STANDING):  aspirin 325 milliGRAM(s) Oral every 12 hours  buDESOnide 160 MICROgram(s)/formoterol 4.5 MICROgram(s) Inhaler 2 Puff(s) Inhalation two times a day  cefTRIAXone   IVPB      cefTRIAXone   IVPB 2 Gram(s) IV Intermittent every 24 hours  clopidogrel Tablet 75 milliGRAM(s) Oral daily  docusate sodium 100 milliGRAM(s) Oral two times a day  heparin  Injectable 5000 Unit(s) SubCutaneous every 8 hours  pantoprazole    Tablet 40 milliGRAM(s) Oral before breakfast  simvastatin 20 milliGRAM(s) Oral at bedtime    MEDICATIONS  (PRN):  acetaminophen   Tablet 650 milliGRAM(s) Oral every 6 hours PRN For Temp greater than 38 C (100.4 F)  acetaminophen   Tablet. 650 milliGRAM(s) Oral every 6 hours PRN Mild Pain (1 - 3)  HYDROmorphone  Injectable 1 milliGRAM(s) IV Push every 6 hours PRN Moderate Pain (4 - 6)  melatonin 3 milliGRAM(s) Oral at bedtime PRN Insomnia  nystatin Powder 1 Application(s) Topical two times a day PRN rash    Allergies    No Known Allergies    Intolerances    IV Contrast (Flushing (Skin); Nausea)        All other systems reviewed and negative [ ]     VITAL SIGNS AND PHYSICAL EXAM:  Vital Signs Last 24 Hrs  T(C): 36.6 (26 Dec 2017 17:55), Max: 36.7 (26 Dec 2017 13:27)  T(F): 97.9 (26 Dec 2017 17:55), Max: 98.1 (26 Dec 2017 13:27)  HR: 63 (26 Dec 2017 17:55) (59 - 76)  BP: 140/45 (26 Dec 2017 17:55) (115/69 - 140/45)  BP(mean): --  RR: 18 (26 Dec 2017 17:55) (15 - 21)  SpO2: 97% (26 Dec 2017 17:55) (93% - 100%)  I&O's Summary    25 Dec 2017 07:01  -  26 Dec 2017 07:00  --------------------------------------------------------  IN: 1905 mL / OUT: 655 mL / NET: 1250 mL    26 Dec 2017 07:01  -  26 Dec 2017 18:38  --------------------------------------------------------  IN: 0 mL / OUT: 235 mL / NET: -235 mL        Gen: No acute distress  HEENT: Normocephalic, atraumatic, extraocular movements intact, conjunctiva clear without icterus  CV: Regular rate and rhythm, no murmurs, rubs, or gallops  Resp: Non-labored, clear to auscultation bilaterally  Abd: Soft, non-tender, non-distended  Skin: no rash  Neuro: grossly normal       INTERVAL LAB RESULTS:                        7.8    8.15  )-----------( 109      ( 26 Dec 2017 02:37 )             23.8                         7.1    7.16  )-----------( 72       ( 25 Dec 2017 02:25 )             21.8                         7.3    7.02  )-----------( 74       ( 24 Dec 2017 19:58 )             22.2                               138    |  104    |  25                  Calcium: 7.6   / iCa: x      (12-26 @ 02:37)    ----------------------------<  176       Magnesium: 2.3                              4.6     |  23     |  1.09             Phosphorous: 3.1            INTERVAL IMAGING STUDIES:

## 2017-12-26 NOTE — PROGRESS NOTE ADULT - ASSESSMENT
History of AS s/p TAVR  Normal LV function  Mild pulmonary HTN  Probable diastolic dysfunction  Morbid obesity, HTN, hyperlipidemia  Stable from the cardiovascular perspective.    S/P Stage 1 revision infected knee prosthesis  Receiving IV Ceftriaxone per ID  No current need for ERIKA  On aspirin: can reduce to 81 mg daily  On Plavix.

## 2017-12-26 NOTE — PROGRESS NOTE ADULT - ASSESSMENT
77y female with remote b/l TKR, TAVR and PPM 1 yr ago, CAD- stent, Coumadin, root canal ~ 1 month ago, admitted with 1 wk of fatigue, and septic R knee (53k WBCs)  s/p I&D, hardware removal, placement of spacer.    Bld Cxs Strep sanguinis in all 4 bottles, most likely odontogenic focus  Synovial fluid growing Alpha Strep as well  Underlying TAVR and PPM, hence at risk for valvular or lead endocarditis    Plan:  Ceftriaxone 2 g IV q 24- will need 6 wks via PICC; possibly followed by oral regimen  f/u repeat blood cultures to ensure clearance of bacteremia   Favor ERIKA- r/o valve or lead veg  > 35 min spent in direct assessment of the patient, analysis of all pertinent data, discussion, counseling, and coordination of care; benefit, possible adverse effects, and limitations of antibiotics reviewed 77y female with remote b/l TKR, TAVR and PPM 1 yr ago, CAD- stent, Coumadin, root canal ~ 1 month ago, admitted with 1 wk of fatigue, and septic R knee (53k WBCs)  s/p I&D, hardware removal, placement of spacer.    Bld Cxs Strep sanguinis in all 4 bottles, most likely odontogenic focus  Synovial fluid growing Alpha Strep as well  Underlying TAVR and PPM, hence at risk for valvular or lead endocarditis    Plan:  Ceftriaxone 2 g IV q 24- will need 6 wks via PICC; possibly followed by oral regimen  f/u repeat blood cultures to ensure clearance of bacteremia   would still favor ERIKA- r/o valve or lead veg, d/w primary team, Dr Pearson  > 35 min spent in direct assessment of the patient, analysis of all pertinent data, discussion, counseling, and coordination of care; benefit, possible adverse effects, and limitations of antibiotics reviewed

## 2017-12-26 NOTE — PROGRESS NOTE ADULT - SUBJECTIVE AND OBJECTIVE BOX
Orthopedic PA Progress Note    Pt seen and examined.  Reports pain well controlled, sitting comfortably in bed, no acute events overnight.    T(C): 37.2 (12-25-17 @ 04:00), Max: 37.2 (12-25-17 @ 04:00)  HR: 70 (12-25-17 @ 06:00) (69 - 98)  BP: 97/72 (12-24-17 @ 19:00) (63/28 - 112/32)  RR: 20 (12-25-17 @ 06:00) (16 - 26)  SpO2: 96% (12-25-17 @ 06:00) (78% - 100%)    NAD  RLE in KI with aquacel dressing, C/D/I  EHL/FHL/TA/GS intact  SILT SP/DP/T/S/S  WWP brisk cap refill    77F s/p stage I revision right knee  - Post-op fluid resuscitation as per SICU, monitor platelet count  - Transfuse as needed  - TTWB RLE in KI at all times  - Custom [place] knee brace 0-90 deg ordered, once arrives can swap out KI and remain TTWB with [place] brace on at all times  -  mg BID for DVT ppx  - Hold Plavix x72 hours (may resume 12/26 at 12pm)  - FU ID recs  - SICU care appreciated.

## 2017-12-26 NOTE — PROGRESS NOTE ADULT - ASSESSMENT
77F with morbid obesity, AS s/p TAVR, bilateral knee OA s/p TKA (2002), polymyalgia on chronic prednisone, admitted for septic arthritis w/bacteremia, now s/p explanation w/spacer placement. Will continue ceftriaxone IV per ID recs. ERIKA ordered for evaluation for possible endocarditis on 12/27. Given need for prolonged abx treatment, PICC line placement on 12/27, and will discuss need for line care and rehabilitation with case management.

## 2017-12-26 NOTE — PROGRESS NOTE ADULT - SUBJECTIVE AND OBJECTIVE BOX
SICU Daily Progress Note  =====================================================  Interval/Overnight Events: Patient was given another unit of PRBC yesterday morning. Hemoglobin has been stable at 7.8. Stress dose steroid is to be tapered over the next 4 to 5 days, down to home dose. LR was discontinued.  Plavix is to be started today.     HPI: 77y female s/p BL total knee replacement presented with infected right knee replacement. Blood cultures positive for strep sp. Underwent explantation of R knee hardware with washout and spacer placement. Patient had significant blood loss and required massive resuscitation. Patient extubated at conclusion of procedure. SICU consulted for continued post-operative resuscitation. Patient maintained on low dose phenylephrine gtt intra-operatively. Admitted to SICU for resuscitation and close monitoring.     Allergies: IV Contrast (Flushing (Skin); Nausea)  No Known Allergies      MEDICATIONS:   --------------------------------------------------------------------------------------  Neurologic Medications  acetaminophen   Tablet 650 milliGRAM(s) Oral every 6 hours PRN For Temp greater than 38 C (100.4 F)  acetaminophen   Tablet. 650 milliGRAM(s) Oral every 6 hours PRN Mild Pain (1 - 3)  aspirin 325 milliGRAM(s) Oral every 12 hours  HYDROmorphone  Injectable 1 milliGRAM(s) IV Push every 6 hours PRN Moderate Pain (4 - 6)  melatonin 3 milliGRAM(s) Oral at bedtime PRN Insomnia    Respiratory Medications  buDESOnide 160 MICROgram(s)/formoterol 4.5 MICROgram(s) Inhaler 2 Puff(s) Inhalation two times a day    Cardiovascular Medications  phenylephrine    Infusion 0.9 MICROgram(s)/kG/Min IV Continuous <Continuous>    Gastrointestinal Medications  docusate sodium 100 milliGRAM(s) Oral two times a day  lactated ringers. 1000 milliLiter(s) IV Continuous <Continuous>  pantoprazole    Tablet 40 milliGRAM(s) Oral before breakfast    Genitourinary Medications    Hematologic/Oncologic Medications    Antimicrobial/Immunologic Medications  cefTRIAXone   IVPB      cefTRIAXone   IVPB 2 Gram(s) IV Intermittent every 24 hours    Endocrine/Metabolic Medications  hydrocortisone sodium succinate Injectable 50 milliGRAM(s) IV Push every 6 hours  simvastatin 20 milliGRAM(s) Oral at bedtime    Topical/Other Medications  nystatin Powder 1 Application(s) Topical two times a day PRN rash    --------------------------------------------------------------------------------------    VITAL SIGNS, INS/OUTS (last 24 hours):  --------------------------------------------------------------------------------------  T(C): 35.9 (12-26-17 @ 00:00), Max: 37.2 (12-25-17 @ 16:00)  HR: 60 (12-26-17 @ 04:00) (60 - 82)  BP: --  BP(mean): --  ABP: 112/47 (12-26-17 @ 04:00) (94/43 - 140/54)  ABP(mean): 69 (12-26-17 @ 04:00) (56 - 81)  RR: 16 (12-26-17 @ 04:00) (15 - 22)  SpO2: 96% (12-26-17 @ 04:00) (91% - 100%)  Wt(kg): --  CVP(mm Hg): --  CI: --  CAPILLARY BLOOD GLUCOSE       N/A      12-24 @ 07:01  -  12-25 @ 07:00  --------------------------------------------------------  IN:    IV PiggyBack: 150 mL    lactated ringers.: 2125 mL    sodium chloride 0.9%: 375 mL  Total IN: 2650 mL    OUT:    Indwelling Catheter - Urethral: 940 mL  Total OUT: 940 mL    Total NET: 1710 mL      12-25 @ 07:01  - 12-26 @ 05:53  --------------------------------------------------------  IN:    IV PiggyBack: 50 mL    lactated ringers.: 375 mL    Oral Fluid: 1180 mL    Packed Red Blood Cells: 300 mL  Total IN: 1905 mL    OUT:    Indwelling Catheter - Urethral: 540 mL  Total OUT: 540 mL    Total NET: 1365 mL    --------------------------------------------------------------------------------------    EXAM  EXAM  NEUROLOGY  RASS: 0  	GCS:  15  Exam: Alert, follows commands. No focal deficits.    HEENT  Exam: Normocephalic, atraumatic.  EOMI    RESPIRATORY  Exam: Lungs clear to auscultation    CARDIOVASCULAR  Exam: S1, S2.  Regular rate and rhythm    GI/NUTRITION  Exam: Abdomen soft, Non-tender, Non-distended.    VASCULAR  Exam: RLE wrapped in ace and covered with a brace. + pulse, motor, and sensation distally. Extremities warm, pink, well-perfused.      METABOLIC/FLUIDS/ELECTROLYTES  lactated ringers. 1000 milliLiter(s) IV Continuous <Continuous>      HEMATOLOGIC  [x] VTE Prophylaxis:   Transfusions:	[x] PRBC x 1	[] Platelets		[] FFP	[] Cryoprecipitate    INFECTIOUS DISEASE  Antimicrobials/Immunologic Medications:  cefTRIAXone   IVPB      cefTRIAXone   IVPB 2 Gram(s) IV Intermittent every 24 hours    Day #      of     ***    Tubes/Lines/Drains  ***  [x] Peripheral IV  [] Central Venous Line     	[] R	[] L	[] IJ	[] Fem	[] SC	Date Placed:   [x] Arterial Line		[] R	[x] L	[] Fem	[x] Rad	[] Ax	Date Placed: 12/24  [] PICC		[] Midline		[] Mediport  [x] Urinary Catheter		Date Placed:   [x] Necessity of urinary, arterial, and venous catheters discussed    LABS  --------------------------------------------------------------------------------------  CBC (12-26 @ 02:37)                              7.8<L>                         8.15    )----------------(  109<L>     --    % Neutrophils, --    % Lymphocytes, ANC: --                                  23.8<L>              CBC (12-25 @ 02:25)                              7.1<L>                         7.16    )----------------(  72<L>      --    % Neutrophils, --    % Lymphocytes, ANC: --                                  21.8<L>                BMP (12-26 @ 02:37)             138     |  104     |  25<H> 		Ca++ --      Ca 7.6<L>             ---------------------------------( 176<H>		Mg 2.3                4.6     |  23      |  1.09  			Ph 3.1     BMP (12-25 @ 02:25)             138     |  106     |  15    		Ca++ --      Ca 7.4<L>             ---------------------------------( 200<H>		Mg 2.2                4.5     |  22      |  0.99  			Ph 3.2               -> BLOOD VENOUS Culture (12-24 @ 15:12)     NG    NG  NG    -> BLOOD PERIPHERAL Culture (12-24 @ 12:51)     NG    NG  NG    -> TISSUE Culture (12-23 @ 15:47)       WBC^White Blood Cells  QNTY CELLS IN GRAM STAIN: MODERATE (3+)  NOS^No Organisms Seen      NO GROWTH - PRELIMINARY RESULTS  NO ORGANISMS ISOLATED AT 24 HOURS  NG    -> TISSUE Culture (12-23 @ 15:44)       WBC^White Blood Cells  QNTY CELLS IN GRAM STAIN: FEW (2+)  NOS^No Organisms Seen      NO GROWTH - PRELIMINARY RESULTS  NO ORGANISMS ISOLATED AT 24 HOURS  NG    -> TISSUE Culture (12-23 @ 15:40)       WBC^White Blood Cells  QNTY CELLS IN GRAM STAIN: FEW (2+)  NOS^No Organisms Seen      NO GROWTH - PRELIMINARY RESULTS  NO ORGANISMS ISOLATED AT 24 HOURS  NG    -> TISSUE Culture (12-23 @ 15:35)       WBC^White Blood Cells  QNTY CELLS IN GRAM STAIN: NO CELLS SEEN  NOS^No Organisms Seen      NO GROWTH - PRELIMINARY RESULTS  NO ORGANISMS ISOLATED AT 24 HOURS  NG    -> SURGERY Culture (12-23 @ 15:32)       WBC^White Blood Cells  QNTY CELLS IN GRAM STAIN: MODERATE (3+)  NOS^No Organisms Seen      NO GROWTH - PRELIMINARY RESULTS  NO ORGANISMS ISOLATED AT 24 HOURS  NG    --------------------------------------------------------------------------------------    OTHER LABORATORY:     IMAGING STUDIES:   CXR:     ASSESSMENT:  77y Female w/ infected Right knee hardware s/p explanation w/ spacer placement, bacteremia    PLAN:   Neurologic:   - Continue PRN dilaudid/IV tylenol for analgesia    Respiratory:  - Incentive spirometry, OOB    Cardiovascular:  - Continue to monitor BP, resume phenylephrine PRN  - Restart plavix POD #3 today  - Follow up TTE for endocarditis workup.   - Continue lisinopril, zocor, ASA    Gastrointestinal/Nutrition:   - DASH diet  - Continue protonix, colace    Renal/Genitourinary:   - Continue main  - Trend BMP, replete electrolytes PRN    Hematologic:   -  BID for orthopedic surgery VTE ppx  - Will discuss additional VTE prophylaxis with primary team  - s/p 1 more unit of PRBCS, H&H is stable    Infectious Disease:   - Ceftriaxone, for bacteremia/septic joint  - f/u repeat cultures  - ID reccs appreciated    Tubes/Lines/Drains:  - PIV, lund  - A-line in place     Endocrine:   - Monitor glucose on BMP  - on stress dose steroids, to be tapered back to home dose over the next 4 to 5 days.    Disposition: SICU SICU Daily Progress Note  =====================================================  Interval/Overnight Events: Patient was given another unit of PRBC yesterday morning. Hemoglobin has been stable at 7.8. Stress dose steroid is to be tapered over the next 4 to 5 days, down to home dose. LR was discontinued.  Plavix is to be started today.     HPI: 77y female s/p BL total knee replacement presented with infected right knee replacement. Blood cultures positive for strep sp. Underwent explantation of R knee hardware with washout and spacer placement. Patient had significant blood loss and required massive resuscitation. Patient extubated at conclusion of procedure. SICU consulted for continued post-operative resuscitation. Patient maintained on low dose phenylephrine gtt intra-operatively. Admitted to SICU for resuscitation and close monitoring.     Allergies: IV Contrast (Flushing (Skin); Nausea)  No Known Allergies      MEDICATIONS:   --------------------------------------------------------------------------------------  Neurologic Medications  acetaminophen   Tablet 650 milliGRAM(s) Oral every 6 hours PRN For Temp greater than 38 C (100.4 F)  acetaminophen   Tablet. 650 milliGRAM(s) Oral every 6 hours PRN Mild Pain (1 - 3)  aspirin 325 milliGRAM(s) Oral every 12 hours  HYDROmorphone  Injectable 1 milliGRAM(s) IV Push every 6 hours PRN Moderate Pain (4 - 6)  melatonin 3 milliGRAM(s) Oral at bedtime PRN Insomnia    Respiratory Medications  buDESOnide 160 MICROgram(s)/formoterol 4.5 MICROgram(s) Inhaler 2 Puff(s) Inhalation two times a day    Cardiovascular Medications  phenylephrine    Infusion 0.9 MICROgram(s)/kG/Min IV Continuous <Continuous>    Gastrointestinal Medications  docusate sodium 100 milliGRAM(s) Oral two times a day  lactated ringers. 1000 milliLiter(s) IV Continuous <Continuous>  pantoprazole    Tablet 40 milliGRAM(s) Oral before breakfast    Genitourinary Medications    Hematologic/Oncologic Medications    Antimicrobial/Immunologic Medications  cefTRIAXone   IVPB      cefTRIAXone   IVPB 2 Gram(s) IV Intermittent every 24 hours    Endocrine/Metabolic Medications  hydrocortisone sodium succinate Injectable 50 milliGRAM(s) IV Push every 6 hours  simvastatin 20 milliGRAM(s) Oral at bedtime    Topical/Other Medications  nystatin Powder 1 Application(s) Topical two times a day PRN rash    --------------------------------------------------------------------------------------    VITAL SIGNS, INS/OUTS (last 24 hours):  --------------------------------------------------------------------------------------  T(C): 35.9 (12-26-17 @ 00:00), Max: 37.2 (12-25-17 @ 16:00)  HR: 60 (12-26-17 @ 04:00) (60 - 82)  BP: --  BP(mean): --  ABP: 112/47 (12-26-17 @ 04:00) (94/43 - 140/54)  ABP(mean): 69 (12-26-17 @ 04:00) (56 - 81)  RR: 16 (12-26-17 @ 04:00) (15 - 22)  SpO2: 96% (12-26-17 @ 04:00) (91% - 100%)  Wt(kg): --  CVP(mm Hg): --  CI: --  CAPILLARY BLOOD GLUCOSE       N/A      12-24 @ 07:01  -  12-25 @ 07:00  --------------------------------------------------------  IN:    IV PiggyBack: 150 mL    lactated ringers.: 2125 mL    sodium chloride 0.9%: 375 mL  Total IN: 2650 mL    OUT:    Indwelling Catheter - Urethral: 940 mL  Total OUT: 940 mL    Total NET: 1710 mL      12-25 @ 07:01  - 12-26 @ 05:53  --------------------------------------------------------  IN:    IV PiggyBack: 50 mL    lactated ringers.: 375 mL    Oral Fluid: 1180 mL    Packed Red Blood Cells: 300 mL  Total IN: 1905 mL    OUT:    Indwelling Catheter - Urethral: 540 mL  Total OUT: 540 mL    Total NET: 1365 mL    --------------------------------------------------------------------------------------    EXAM  EXAM  NEUROLOGY  RASS: 0  	GCS:  15  Exam: Alert, follows commands. No focal deficits.    HEENT  Exam: Normocephalic, atraumatic.  EOMI    RESPIRATORY  Exam: Lungs clear to auscultation    CARDIOVASCULAR  Exam: S1, S2.  Regular rate and rhythm    GI/NUTRITION  Exam: Abdomen soft, Non-tender, Non-distended.    VASCULAR  Exam: RLE wrapped in ace and covered with a brace. + pulse, motor, and sensation distally. Extremities warm, pink, well-perfused.      METABOLIC/FLUIDS/ELECTROLYTES  lactated ringers. 1000 milliLiter(s) IV Continuous <Continuous>      HEMATOLOGIC  [x] VTE Prophylaxis:   Transfusions:	[x] PRBC x 1	[] Platelets		[] FFP	[] Cryoprecipitate    INFECTIOUS DISEASE  Antimicrobials/Immunologic Medications:  cefTRIAXone   IVPB      cefTRIAXone   IVPB 2 Gram(s) IV Intermittent every 24 hours    Day #      of     ***    Tubes/Lines/Drains  ***  [x] Peripheral IV  [] Central Venous Line     	[] R	[] L	[] IJ	[] Fem	[] SC	Date Placed:   [x] Arterial Line		[] R	[x] L	[] Fem	[x] Rad	[] Ax	Date Placed: 12/24  [] PICC		[] Midline		[] Mediport  [x] Urinary Catheter		Date Placed:   [x] Necessity of urinary, arterial, and venous catheters discussed    LABS  --------------------------------------------------------------------------------------  CBC (12-26 @ 02:37)                              7.8<L>                         8.15    )----------------(  109<L>     --    % Neutrophils, --    % Lymphocytes, ANC: --                                  23.8<L>              CBC (12-25 @ 02:25)                              7.1<L>                         7.16    )----------------(  72<L>      --    % Neutrophils, --    % Lymphocytes, ANC: --                                  21.8<L>                BMP (12-26 @ 02:37)             138     |  104     |  25<H> 		Ca++ --      Ca 7.6<L>             ---------------------------------( 176<H>		Mg 2.3                4.6     |  23      |  1.09  			Ph 3.1     BMP (12-25 @ 02:25)             138     |  106     |  15    		Ca++ --      Ca 7.4<L>             ---------------------------------( 200<H>		Mg 2.2                4.5     |  22      |  0.99  			Ph 3.2               -> BLOOD VENOUS Culture (12-24 @ 15:12)     NG    NG  NG    -> BLOOD PERIPHERAL Culture (12-24 @ 12:51)     NG    NG  NG    -> TISSUE Culture (12-23 @ 15:47)       WBC^White Blood Cells  QNTY CELLS IN GRAM STAIN: MODERATE (3+)  NOS^No Organisms Seen      NO GROWTH - PRELIMINARY RESULTS  NO ORGANISMS ISOLATED AT 24 HOURS  NG    -> TISSUE Culture (12-23 @ 15:44)       WBC^White Blood Cells  QNTY CELLS IN GRAM STAIN: FEW (2+)  NOS^No Organisms Seen      NO GROWTH - PRELIMINARY RESULTS  NO ORGANISMS ISOLATED AT 24 HOURS  NG    -> TISSUE Culture (12-23 @ 15:40)       WBC^White Blood Cells  QNTY CELLS IN GRAM STAIN: FEW (2+)  NOS^No Organisms Seen      NO GROWTH - PRELIMINARY RESULTS  NO ORGANISMS ISOLATED AT 24 HOURS  NG    -> TISSUE Culture (12-23 @ 15:35)       WBC^White Blood Cells  QNTY CELLS IN GRAM STAIN: NO CELLS SEEN  NOS^No Organisms Seen      NO GROWTH - PRELIMINARY RESULTS  NO ORGANISMS ISOLATED AT 24 HOURS  NG    -> SURGERY Culture (12-23 @ 15:32)       WBC^White Blood Cells  QNTY CELLS IN GRAM STAIN: MODERATE (3+)  NOS^No Organisms Seen      NO GROWTH - PRELIMINARY RESULTS  NO ORGANISMS ISOLATED AT 24 HOURS  NG    --------------------------------------------------------------------------------------    OTHER LABORATORY:     IMAGING STUDIES:   CXR:     ASSESSMENT:  77y Female w/ infected Right knee hardware s/p explanation w/ spacer placement, bacteremia    PLAN:   Neurologic:   - Continue PRN dilaudid/IV tylenol for analgesia    Respiratory:  - Incentive spirometry, OOB    Cardiovascular:  - Continue to monitor BP, resume phenylephrine PRN  - Restart plavix POD #3 today  - Follow up TTE for endocarditis workup.   - Continue lisinopril, zocor, ASA    Gastrointestinal/Nutrition:   - DASH diet  - Continue protonix, colace    Renal/Genitourinary:   - Continue monitor BUN/Cr  - Trend BMP, replete electrolytes PRN    Hematologic:   -  BID for orthopedic surgery VTE ppx  - Starting subq heparin today  - s/p 1 more unit of PRBCS, H&H is stable    Infectious Disease:   - Ceftriaxone, for bacteremia/septic joint  - f/u repeat cultures  - ID reccs appreciated    Tubes/Lines/Drains:  - PIV, lund  - A-line in place     Endocrine:   - Monitor glucose on BMP  - on stress dose steroids, to be tapered back to home dose over the next 4 to 5 days.    Disposition: SICU

## 2017-12-26 NOTE — PROGRESS NOTE ADULT - PROBLEM SELECTOR PLAN 1
-continue IV ceftriaxone, appreciate ID recs  -ERIKA to evaluate for endocarditis (suspected infectious source) -continue IV ceftriaxone, appreciate ID recs  -ERIKA to evaluate for endocarditis (suspected infectious source)  -continue acetaminophen, dilaudid PRN for pain control

## 2017-12-26 NOTE — CHART NOTE - NSCHARTNOTEFT_GEN_A_CORE
Discussed with Ortho team over the weekend and Ortho requested Dr. Cannon to assume ID care for this patient.     Will sign off. Please call with questions.

## 2017-12-27 LAB
ALBUMIN SERPL ELPH-MCNC: 2.7 G/DL — LOW (ref 3.3–5)
ALP SERPL-CCNC: 101 U/L — SIGNIFICANT CHANGE UP (ref 40–120)
ALT FLD-CCNC: 18 U/L — SIGNIFICANT CHANGE UP (ref 4–33)
ANTIBODY ID 1_1: SIGNIFICANT CHANGE UP
AST SERPL-CCNC: 21 U/L — SIGNIFICANT CHANGE UP (ref 4–32)
BILIRUB SERPL-MCNC: 0.2 MG/DL — SIGNIFICANT CHANGE UP (ref 0.2–1.2)
BLD GP AB SCN SERPL QL: POSITIVE — SIGNIFICANT CHANGE UP
BLD GP AB SCN SERPL QL: POSITIVE — SIGNIFICANT CHANGE UP
BUN SERPL-MCNC: 35 MG/DL — HIGH (ref 7–23)
CALCIUM SERPL-MCNC: 8 MG/DL — LOW (ref 8.4–10.5)
CHLORIDE SERPL-SCNC: 105 MMOL/L — SIGNIFICANT CHANGE UP (ref 98–107)
CO2 SERPL-SCNC: 20 MMOL/L — LOW (ref 22–31)
CREAT SERPL-MCNC: 1.31 MG/DL — HIGH (ref 0.5–1.3)
DAT POLY-SP REAG RBC QL: NEGATIVE — SIGNIFICANT CHANGE UP
GLUCOSE SERPL-MCNC: 112 MG/DL — HIGH (ref 70–99)
HCT VFR BLD CALC: 26.6 % — LOW (ref 34.5–45)
HGB BLD-MCNC: 8.9 G/DL — LOW (ref 11.5–15.5)
MAGNESIUM SERPL-MCNC: 2.4 MG/DL — SIGNIFICANT CHANGE UP (ref 1.6–2.6)
MCHC RBC-ENTMCNC: 29.4 PG — SIGNIFICANT CHANGE UP (ref 27–34)
MCHC RBC-ENTMCNC: 33.5 % — SIGNIFICANT CHANGE UP (ref 32–36)
MCV RBC AUTO: 87.8 FL — SIGNIFICANT CHANGE UP (ref 80–100)
NRBC # FLD: 0 — SIGNIFICANT CHANGE UP
PHOSPHATE SERPL-MCNC: 3.4 MG/DL — SIGNIFICANT CHANGE UP (ref 2.5–4.5)
PLATELET # BLD AUTO: 180 K/UL — SIGNIFICANT CHANGE UP (ref 150–400)
PMV BLD: 12 FL — SIGNIFICANT CHANGE UP (ref 7–13)
POTASSIUM SERPL-MCNC: 5 MMOL/L — SIGNIFICANT CHANGE UP (ref 3.5–5.3)
POTASSIUM SERPL-SCNC: 5 MMOL/L — SIGNIFICANT CHANGE UP (ref 3.5–5.3)
PROT SERPL-MCNC: 6 G/DL — SIGNIFICANT CHANGE UP (ref 6–8.3)
RBC # BLD: 3.03 M/UL — LOW (ref 3.8–5.2)
RBC # FLD: 15.5 % — HIGH (ref 10.3–14.5)
RH IG SCN BLD-IMP: POSITIVE — SIGNIFICANT CHANGE UP
RH IG SCN BLD-IMP: POSITIVE — SIGNIFICANT CHANGE UP
SODIUM SERPL-SCNC: 138 MMOL/L — SIGNIFICANT CHANGE UP (ref 135–145)
WBC # BLD: 10.9 K/UL — HIGH (ref 3.8–10.5)
WBC # FLD AUTO: 10.9 K/UL — HIGH (ref 3.8–10.5)

## 2017-12-27 PROCEDURE — 93325 DOPPLER ECHO COLOR FLOW MAPG: CPT | Mod: 26,GC

## 2017-12-27 PROCEDURE — 93308 TTE F-UP OR LMTD: CPT | Mod: 26,GC

## 2017-12-27 PROCEDURE — 99232 SBSQ HOSP IP/OBS MODERATE 35: CPT

## 2017-12-27 PROCEDURE — 86077 PHYS BLOOD BANK SERV XMATCH: CPT

## 2017-12-27 PROCEDURE — 93321 DOPPLER ECHO F-UP/LMTD STD: CPT | Mod: 26

## 2017-12-27 PROCEDURE — 99233 SBSQ HOSP IP/OBS HIGH 50: CPT | Mod: GC

## 2017-12-27 RX ORDER — OXYCODONE HYDROCHLORIDE 5 MG/1
10 TABLET ORAL
Qty: 0 | Refills: 0 | Status: DISCONTINUED | OUTPATIENT
Start: 2017-12-27 | End: 2018-01-02

## 2017-12-27 RX ORDER — OXYCODONE HYDROCHLORIDE 5 MG/1
5 TABLET ORAL EVERY 4 HOURS
Qty: 0 | Refills: 0 | Status: DISCONTINUED | OUTPATIENT
Start: 2017-12-27 | End: 2017-12-27

## 2017-12-27 RX ORDER — OXYCODONE AND ACETAMINOPHEN 5; 325 MG/1; MG/1
1 TABLET ORAL EVERY 4 HOURS
Qty: 0 | Refills: 0 | Status: DISCONTINUED | OUTPATIENT
Start: 2017-12-27 | End: 2018-01-02

## 2017-12-27 RX ORDER — HYDROMORPHONE HYDROCHLORIDE 2 MG/ML
1 INJECTION INTRAMUSCULAR; INTRAVENOUS; SUBCUTANEOUS EVERY 4 HOURS
Qty: 0 | Refills: 0 | Status: DISCONTINUED | OUTPATIENT
Start: 2017-12-27 | End: 2017-12-27

## 2017-12-27 RX ADMIN — Medication 2.5 MILLIGRAM(S): at 17:21

## 2017-12-27 RX ADMIN — BUDESONIDE AND FORMOTEROL FUMARATE DIHYDRATE 2 PUFF(S): 160; 4.5 AEROSOL RESPIRATORY (INHALATION) at 10:13

## 2017-12-27 RX ADMIN — CLOPIDOGREL BISULFATE 75 MILLIGRAM(S): 75 TABLET, FILM COATED ORAL at 11:31

## 2017-12-27 RX ADMIN — Medication 325 MILLIGRAM(S): at 22:04

## 2017-12-27 RX ADMIN — OXYCODONE HYDROCHLORIDE 10 MILLIGRAM(S): 5 TABLET ORAL at 11:30

## 2017-12-27 RX ADMIN — OXYCODONE HYDROCHLORIDE 10 MILLIGRAM(S): 5 TABLET ORAL at 22:46

## 2017-12-27 RX ADMIN — HYDROMORPHONE HYDROCHLORIDE 1 MILLIGRAM(S): 2 INJECTION INTRAMUSCULAR; INTRAVENOUS; SUBCUTANEOUS at 08:10

## 2017-12-27 RX ADMIN — Medication 3 MILLIGRAM(S): at 00:43

## 2017-12-27 RX ADMIN — OXYCODONE AND ACETAMINOPHEN 1 TABLET(S): 5; 325 TABLET ORAL at 16:31

## 2017-12-27 RX ADMIN — HYDROMORPHONE HYDROCHLORIDE 1 MILLIGRAM(S): 2 INJECTION INTRAMUSCULAR; INTRAVENOUS; SUBCUTANEOUS at 07:56

## 2017-12-27 RX ADMIN — OXYCODONE AND ACETAMINOPHEN 1 TABLET(S): 5; 325 TABLET ORAL at 17:30

## 2017-12-27 RX ADMIN — Medication 325 MILLIGRAM(S): at 13:02

## 2017-12-27 RX ADMIN — CEFTRIAXONE 100 GRAM(S): 500 INJECTION, POWDER, FOR SOLUTION INTRAMUSCULAR; INTRAVENOUS at 17:26

## 2017-12-27 RX ADMIN — HEPARIN SODIUM 5000 UNIT(S): 5000 INJECTION INTRAVENOUS; SUBCUTANEOUS at 06:09

## 2017-12-27 RX ADMIN — HEPARIN SODIUM 5000 UNIT(S): 5000 INJECTION INTRAVENOUS; SUBCUTANEOUS at 21:29

## 2017-12-27 RX ADMIN — HEPARIN SODIUM 5000 UNIT(S): 5000 INJECTION INTRAVENOUS; SUBCUTANEOUS at 13:02

## 2017-12-27 RX ADMIN — PANTOPRAZOLE SODIUM 40 MILLIGRAM(S): 20 TABLET, DELAYED RELEASE ORAL at 13:02

## 2017-12-27 NOTE — PROGRESS NOTE ADULT - SUBJECTIVE AND OBJECTIVE BOX
CC: f/u for S sanguinis bacteremia, R knee PJI    Patient reports no new c/o, had some knee pain    REVIEW OF SYSTEMS:  All other review of systems negative (Comprehensive ROS)    Antimicrobials POD # 34cefTRIAXone   IVPB      cefTRIAXone   IVPB 2 Gram(s) IV Intermittent every 24 hours    Other Medications Reviewed    Vital Signs Last 24 Hrs  T(C): 36.8 (27 Dec 2017 14:15), Max: 36.8 (27 Dec 2017 01:36)  T(F): 98.3 (27 Dec 2017 14:15), Max: 98.3 (27 Dec 2017 14:15)  HR: 65 (27 Dec 2017 14:15) (60 - 67)  BP: 139/52 (27 Dec 2017 14:15) (131/51 - 150/69)  BP(mean): --  RR: 18 (27 Dec 2017 14:15) (18 - 18)  SpO2: 98% (27 Dec 2017 14:15) (96% - 100%)    PHYSICAL EXAM:  General: alert, no acute distress  Eyes:  anicteric, no conjunctival injection, no discharge  Oropharynx: no lesions or injection 	  Neck: supple, without adenopathy  Lungs: clear to auscultation  Heart: regular rate and rhythm; soft systolic murmur  Abdomen: soft, nondistended, nontender, without mass or organomegaly  Skin: no lesions  Extremities: R knee dressings intact  Neurologic: alert, oriented, moves all extremities    LAB RESULTS:                                   7.8    8.15  )-----------( 109      ( 26 Dec 2017 02:37 )             23.8   12-26    138  |  104  |  25<H>  ----------------------------<  176<H>  4.6   |  23  |  1.09    Ca    7.6<L>      26 Dec 2017 02:37  Phos  3.1     12-26  Mg     2.3     12-26          MICROBIOLOGY REVIEWED  RADIOLOGY REVIEWED

## 2017-12-27 NOTE — PROGRESS NOTE ADULT - ASSESSMENT
77F with morbid obesity, AS s/p TAVR, bilateral knee OA s/p TKA (2002), polymyalgia on chronic prednisone, admitted for septic arthritis w/bacteremia, now s/p explanation w/spacer placement. Will continue ceftriaxone IV per ID recs. ERIKA ordered for evaluation for possible endocarditis on 12/27. Given need for prolonged abx treatment, PICC line placement on 12/27, and will discuss need for line care and rehabilitation with case management. 77F with morbid obesity, AS s/p TAVR, bilateral knee OA s/p TKA (2002), polymyalgia on chronic prednisone, admitted for septic arthritis w/bacteremia, now s/p explanation w/spacer placement. Will continue ceftriaxone IV per ID recs. TTE will be done instead of ERIKA after discussion with cardiology. Ordered for evaluation for possible endocarditis on 12/27. Given need for prolonged abx treatment, PICC line placement on 12/27, and will discuss need for line care and rehabilitation with case management.

## 2017-12-27 NOTE — PROGRESS NOTE ADULT - SUBJECTIVE AND OBJECTIVE BOX
Cardiology/Vascular Medicine Inpatient Progress Note    s/p Stage 1 revision infected knee prosthesis  Clinically stable from cardiovascular perspective  Blood cultures NGTD  From our perspective, no current need for ERIKA  On IV Ceftriaxone per ID  On aspirin, can reduce to 81 mg daily  Recommend resuming clopidogrel when possible.    Vital Signs Last 24 Hrs  T(C): 36.6 (27 Dec 2017 06:06), Max: 36.8 (27 Dec 2017 01:36)  T(F): 97.9 (27 Dec 2017 06:06), Max: 98.2 (27 Dec 2017 01:36)  HR: 60 (27 Dec 2017 06:06) (59 - 67)  BP: 146/62 (27 Dec 2017 06:06) (115/69 - 146/62)  BP(mean): --  RR: 18 (27 Dec 2017 06:06) (18 - 20)  SpO2: 100% (27 Dec 2017 06:06) (95% - 100%)    Appearance: Normal	  HEENT:   Normal oral mucosa, PERRL, EOMI	  Lymphatic: No lymphadenopathy  Cardiovascular: Normal S1 S2, No JVD, No murmurs, No edema  Respiratory: Lungs clear to auscultation	  Psychiatry: A & O x 3, Mood & affect appropriate  Gastrointestinal:  Soft, Non-tender, + BS	  Skin: No rashes, No ecchymoses, No cyanosis	  Neurologic: Non-focal  Extremities: Normal range of motion, No clubbing, cyanosis, +dressings    MEDICATIONS  (STANDING):  aspirin 325 milliGRAM(s) Oral every 12 hours  buDESOnide 160 MICROgram(s)/formoterol 4.5 MICROgram(s) Inhaler 2 Puff(s) Inhalation two times a day  cefTRIAXone   IVPB      cefTRIAXone   IVPB 2 Gram(s) IV Intermittent every 24 hours  clopidogrel Tablet 75 milliGRAM(s) Oral daily  docusate sodium 100 milliGRAM(s) Oral two times a day  heparin  Injectable 5000 Unit(s) SubCutaneous every 8 hours  pantoprazole    Tablet 40 milliGRAM(s) Oral before breakfast  predniSONE   Tablet 2.5 milliGRAM(s) Oral two times a day  simvastatin 20 milliGRAM(s) Oral at bedtime    MEDICATIONS  (PRN):  acetaminophen   Tablet 650 milliGRAM(s) Oral every 6 hours PRN For Temp greater than 38 C (100.4 F)  acetaminophen   Tablet. 650 milliGRAM(s) Oral every 6 hours PRN Mild Pain (1 - 3)  HYDROmorphone  Injectable 1 milliGRAM(s) IV Push every 4 hours PRN Moderate Pain (4 - 6)  melatonin 3 milliGRAM(s) Oral at bedtime PRN Insomnia  nystatin Powder 1 Application(s) Topical two times a day PRN rash      LABS:	 	                                   7.8    8.15  )-----------( 109      ( 26 Dec 2017 02:37 )             23.8     12-26    138  |  104  |  25<H>  ----------------------------<  176<H>  4.6   |  23  |  1.09    Ca    7.6<L>      26 Dec 2017 02:37  Phos  3.1     12-26  Mg     2.3     12-26                 < from: Transthoracic Echocardiogram (12.19.17 @ 07:36) >  Patient name: IRASEMA KOHLER  YOB: 1940   Age: 77 (F)   MR#: [id number]  Study Date: 12/19/2017  Location: O/PSonographer: Lauren Finkelstein  Study quality: Technically good  Referring Physician: Roverto Roblero MD  Blood Pressure: 152/63 mmHg  Height: 160 cm  Weight: 120 kg  BSA: 2.2 m2  ------------------------------------------------------------------------  PT/INR - ( 24 Dec 2017 14:47 )   PT: 12.4 SEC;   INR: 1.08          PTT - ( 24 Dec 2017 14:47 )  PTT:31.8 SECPROCEDURE: Transthoracic echocardiogram with 2-D, M-Mode  and complete spectral and color flow Doppler.  INDICATION: Atherosclerotic heart disease of native  coronary artery without angina pectoris (I25.10),  Unspecified diastolic (congestive) heart failure (I50.30)  ------------------------------------------------------------------------  DIMENSIONS:  Dimensions:     Normal Values:  LA:     3.5 cm    2.0 - 4.0 cm  Ao:     2.2 cm    2.0 - 3.8 cm  SEPTUM: 0.9 cm    0.6 - 1.2 cm  PWT:    0.8 cm    0.6 - 1.1 cm  LVIDd:  4.6 cm    3.0 - 5.6 cm  LVIDs:  2.6 cm    1.8 - 4.0 cm  Derived Variables:  LVMI: 59 g/m2  RWT: 0.34  Fractional short: 43 %  Ejection Fraction (Teicholtz): 75 %  ------------------------------------------------------------------------  OBSERVATIONS:  Mitral Valve: Mitral annular calcification and calcified  mitral leaflets with normal diastolic opening. Mild mitral  regurgitation.  Mean transmitral valve gradient equals 5 mm  Hg, consistent with moderate mitral stenosis.  Aortic Root: Normal aortic root.  Aortic Valve: TAVR noted. Peak transaortic valve gradient  equals 27 mm Hg, mean transaorticvalve gradient equals 13  mm Hg, which is probably normal in the presence of a  prosthetic valve. Minimal aortic regurgitation.  Left Atrium: Normal left atrium.  LA volume index = 31  cc/m2.  Left Ventricle: Normal left ventricular systolic function.  No segmental wall motion abnormalities. Normal left  ventricular internal dimensions and wall thicknesses.  Right Heart: Normal right atrium. Normal right ventricular  size and function. Normal tricuspid valve.  Mild tricuspid  regurgitation. Normal pulmonic valve. Minimal pulmonic  regurgitation.  Pericardium/PleuraNormal pericardium with no pericardial  effusion.  Hemodynamic: Estimated right ventricular systolic pressure  equals 41 mm Hg, assuming right atrial pressure equals 10  mm Hg, consistent with mild pulmonary hypertension.  ------------------------------------------------------------------------  CONCLUSIONS:  1. TAVR noted. Peak transaortic valve gradient equals 27 mm  Hg, mean transaortic valve gradient equals 13 mm Hg, which  is probably normal in the presence of a prosthetic valve.  Minimal aortic regurgitation.  2. Normal left ventricular internal dimensions and wall  thicknesses.  3. Normal left ventricular systolic function. No segmental  wall motion abnormalities.  4.Normal right ventricular size and function.  5. Normal tricuspid valve.  Mild tricuspid regurgitation.  6. Estimated pulmonary artery systolic pressure equals 41  mm Hg, assuming right atrial pressure equals 10  mm Hg,  consistent with mild pulmonaryhypertension.  ------------------------------------------------------------------------  Confirmed on  12/20/2017 - 07:16:23 by Roverto Roblero MD,  [facility], [place], [place]  ------------------------------------------------------------------------    < end of copied text >

## 2017-12-27 NOTE — PROGRESS NOTE ADULT - SUBJECTIVE AND OBJECTIVE BOX
S: No acute events overnight. Pain controlled.    O:   Vital Signs Last 24 Hrs  T(C): 36.8 (12-27-17 @ 01:36), Max: 36.8 (12-27-17 @ 01:36)  T(F): 98.2 (12-27-17 @ 01:36), Max: 98.2 (12-27-17 @ 01:36)  HR: 60 (12-27-17 @ 01:36) (59 - 67)  BP: 132/54 (12-27-17 @ 01:36) (115/69 - 140/45)  BP(mean): --  RR: 18 (12-27-17 @ 01:36) (17 - 20)  SpO2: 96% (12-27-17 @ 01:36) (95% - 100%)    Physical Exam  NAD  RLE in [place] with aquacell dressing, C/D/I  EHL/FHL/TA/GS intact  SILT SP/DP/T/S/S  WWP brisk cap refill                          7.8    8.15  )-----------( 109      ( 26 Dec 2017 02:37 )             23.8     26 Dec 2017 02:37    138    |  104    |  25     ----------------------------<  176    4.6     |  23     |  1.09     Ca    7.6        26 Dec 2017 02:37  Phos  3.1       26 Dec 2017 02:37  Mg     2.3       26 Dec 2017 02:37      77F s/p stage I revision right knee POD 4  - f/u ERIKA for endocarditis   - ceftriaxone as per ID recs,6 weeks via PICC   - Transfuse as needed  - TTWB RLE in [place] at all times  - Custom [place] knee brace 0-90 deg ordered, once arrives can swap out KI and remain TTWB with [place] brace on at all times  - Plavix for TAVR ppx,  mg BID and HSQ for DVT ppx

## 2017-12-27 NOTE — PROGRESS NOTE ADULT - ASSESSMENT
77y female with remote b/l TKR, TAVR and PPM 1 yr ago, CAD- stent, Coumadin, root canal ~ 1 month ago, admitted with 1 wk of fatigue, and septic R knee (53k WBCs)  s/p I&D, hardware removal, placement of spacer.    Bld Cxs Strep sanguinis in all 4 bottles, most likely odontogenic focus  Synovial fluid with strep sanguinus as well  Underlying TAVR and PPM, hence at risk for valvular or lead endocarditis    Plan:  Ceftriaxone 2 g IV q 24- 6 wks via PICC day 4/42; possibly followed by extended oral regimen  f/u repeat blood cultures to ensure clearance of bacteremia   d/w cardiology Dr Roblero, TTE is scheduled, no ERIKA for now as per cardiology due to difficult airway intubation

## 2017-12-27 NOTE — PROGRESS NOTE ADULT - PROBLEM SELECTOR PLAN 1
-continue IV ceftriaxone, appreciate ID recs  -PICC line placement today 12/27  -ERIKA to evaluate for endocarditis (suspected infectious source) today 12/27  -continue acetaminophen, dilaudid PRN for pain control -continue IV ceftriaxone, appreciate ID recs  -PICC line placement today 12/27  -TTE to evaluate for endocarditis (suspected infectious source) today 12/27  -continue acetaminophen, dilaudid PRN for pain control

## 2017-12-27 NOTE — PROGRESS NOTE ADULT - SUBJECTIVE AND OBJECTIVE BOX
INTERVAL/OVERNIGHT EVENTS:     Overnight, pt required dilaudid x1 for increasing pain. Was administered with desired effect. She has remained NPO since midnight for ERIKA today. Continued to have loose stool overnight. Maintaining good urinary output.      HPI: 77y female s/p BL total knee replacement presented with infected right knee replacement. Blood cultures positive for strep sp. Underwent explantation of R knee hardware with washout and spacer placement. Patient had significant blood loss and required massive resuscitation. Patient extubated at conclusion of procedure. SICU consulted for continued post-operative resuscitation. Patient maintained on low dose phenylephrine gtt intra-operatively. Admitted to SICU for resuscitation and close monitoring. Transferred back to medicine service once stable on 12/26.       MEDICATIONS  (STANDING):  aspirin 325 milliGRAM(s) Oral every 12 hours  buDESOnide 160 MICROgram(s)/formoterol 4.5 MICROgram(s) Inhaler 2 Puff(s) Inhalation two times a day  cefTRIAXone   IVPB      cefTRIAXone   IVPB 2 Gram(s) IV Intermittent every 24 hours  clopidogrel Tablet 75 milliGRAM(s) Oral daily  docusate sodium 100 milliGRAM(s) Oral two times a day  heparin  Injectable 5000 Unit(s) SubCutaneous every 8 hours  pantoprazole    Tablet 40 milliGRAM(s) Oral before breakfast  predniSONE   Tablet 2.5 milliGRAM(s) Oral two times a day  simvastatin 20 milliGRAM(s) Oral at bedtime    MEDICATIONS  (PRN):  acetaminophen   Tablet 650 milliGRAM(s) Oral every 6 hours PRN For Temp greater than 38 C (100.4 F)  acetaminophen   Tablet. 650 milliGRAM(s) Oral every 6 hours PRN Mild Pain (1 - 3)  melatonin 3 milliGRAM(s) Oral at bedtime PRN Insomnia  nystatin Powder 1 Application(s) Topical two times a day PRN rash  oxyCODONE    IR 5 milliGRAM(s) Oral every 4 hours PRN Moderate Pain (4 - 6)    Allergies    No Known Allergies    Intolerances    IV Contrast (Flushing (Skin); Nausea)        All other systems reviewed and negative [ ]     VITAL SIGNS AND PHYSICAL EXAM:  Vital Signs Last 24 Hrs  T(C): 36.6 (27 Dec 2017 06:06), Max: 36.8 (27 Dec 2017 01:36)  T(F): 97.9 (27 Dec 2017 06:06), Max: 98.2 (27 Dec 2017 01:36)  HR: 60 (27 Dec 2017 06:06) (59 - 67)  BP: 146/62 (27 Dec 2017 06:06) (115/69 - 146/62)  BP(mean): --  RR: 18 (27 Dec 2017 06:06) (18 - 20)  SpO2: 100% (27 Dec 2017 06:06) (95% - 100%)  I&O's Summary    26 Dec 2017 07:01  -  27 Dec 2017 07:00  --------------------------------------------------------  IN: 0 mL / OUT: 485 mL / NET: -485 mL    Gen: No acute distress  HEENT: Normocephalic, atraumatic, extraocular movements intact, conjunctiva clear without ictuers  CV: Regular rate and rhythm, no murmurs, rubs, or gallops  Resp: Non-labored, clear to auscultation bilaterally  Abd: soft, non-tender, non-distended  Skin: no rash  Neuro: grossly normal   Ext: brace/dressings on RLE. peripheral pulses 2+ b/l    INTERVAL LAB RESULTS:                        8.9    10.90 )-----------( 180      ( 27 Dec 2017 05:30 )             26.6                         7.8    8.15  )-----------( 109      ( 26 Dec 2017 02:37 )             23.8                         7.1    7.16  )-----------( 72       ( 25 Dec 2017 02:25 )             21.8                               138    |  105    |  35                  Calcium: 8.0   / iCa: x      (12-27 @ 05:30)    ----------------------------<  112       Magnesium: 2.4                              5.0     |  20     |  1.31             Phosphorous: 3.4      TPro  6.0    /  Alb  2.7    /  TBili  0.2    /  DBili  x      /  AST  21     /  ALT  18     /  AlkPhos  101    27 Dec 2017 05:30        INTERVAL IMAGING STUDIES:

## 2017-12-28 LAB
BUN SERPL-MCNC: 37 MG/DL — HIGH (ref 7–23)
CALCIUM SERPL-MCNC: 7.7 MG/DL — LOW (ref 8.4–10.5)
CHLORIDE SERPL-SCNC: 107 MMOL/L — SIGNIFICANT CHANGE UP (ref 98–107)
CO2 SERPL-SCNC: 23 MMOL/L — SIGNIFICANT CHANGE UP (ref 22–31)
CREAT SERPL-MCNC: 1.13 MG/DL — SIGNIFICANT CHANGE UP (ref 0.5–1.3)
GLUCOSE SERPL-MCNC: 92 MG/DL — SIGNIFICANT CHANGE UP (ref 70–99)
HCT VFR BLD CALC: 25.1 % — LOW (ref 34.5–45)
HGB BLD-MCNC: 8.3 G/DL — LOW (ref 11.5–15.5)
MAGNESIUM SERPL-MCNC: 2.3 MG/DL — SIGNIFICANT CHANGE UP (ref 1.6–2.6)
MCHC RBC-ENTMCNC: 28.8 PG — SIGNIFICANT CHANGE UP (ref 27–34)
MCHC RBC-ENTMCNC: 33.1 % — SIGNIFICANT CHANGE UP (ref 32–36)
MCV RBC AUTO: 87.2 FL — SIGNIFICANT CHANGE UP (ref 80–100)
NRBC # FLD: 0 — SIGNIFICANT CHANGE UP
PHOSPHATE SERPL-MCNC: 3.8 MG/DL — SIGNIFICANT CHANGE UP (ref 2.5–4.5)
PLATELET # BLD AUTO: 230 K/UL — SIGNIFICANT CHANGE UP (ref 150–400)
PMV BLD: 11.1 FL — SIGNIFICANT CHANGE UP (ref 7–13)
POTASSIUM SERPL-MCNC: 4.6 MMOL/L — SIGNIFICANT CHANGE UP (ref 3.5–5.3)
POTASSIUM SERPL-SCNC: 4.6 MMOL/L — SIGNIFICANT CHANGE UP (ref 3.5–5.3)
RBC # BLD: 2.88 M/UL — LOW (ref 3.8–5.2)
RBC # FLD: 15.6 % — HIGH (ref 10.3–14.5)
SODIUM SERPL-SCNC: 142 MMOL/L — SIGNIFICANT CHANGE UP (ref 135–145)
WBC # BLD: 8.14 K/UL — SIGNIFICANT CHANGE UP (ref 3.8–10.5)
WBC # FLD AUTO: 8.14 K/UL — SIGNIFICANT CHANGE UP (ref 3.8–10.5)

## 2017-12-28 PROCEDURE — 77001 FLUOROGUIDE FOR VEIN DEVICE: CPT | Mod: 26,GC

## 2017-12-28 PROCEDURE — 76937 US GUIDE VASCULAR ACCESS: CPT | Mod: 26

## 2017-12-28 PROCEDURE — 36569 INSJ PICC 5 YR+ W/O IMAGING: CPT

## 2017-12-28 PROCEDURE — 99233 SBSQ HOSP IP/OBS HIGH 50: CPT | Mod: GC

## 2017-12-28 PROCEDURE — 99232 SBSQ HOSP IP/OBS MODERATE 35: CPT

## 2017-12-28 RX ORDER — ASPIRIN/CALCIUM CARB/MAGNESIUM 324 MG
1 TABLET ORAL
Qty: 0 | Refills: 0 | COMMUNITY
Start: 2017-12-28

## 2017-12-28 RX ORDER — TRAMADOL HYDROCHLORIDE 50 MG/1
1 TABLET ORAL
Qty: 0 | Refills: 0 | COMMUNITY

## 2017-12-28 RX ORDER — LANOLIN ALCOHOL/MO/W.PET/CERES
2 CREAM (GRAM) TOPICAL
Qty: 0 | Refills: 0 | COMMUNITY
Start: 2017-12-28

## 2017-12-28 RX ORDER — LANOLIN ALCOHOL/MO/W.PET/CERES
3 CREAM (GRAM) TOPICAL ONCE
Qty: 0 | Refills: 0 | Status: DISCONTINUED | OUTPATIENT
Start: 2017-12-28 | End: 2017-12-28

## 2017-12-28 RX ORDER — LANOLIN ALCOHOL/MO/W.PET/CERES
3 CREAM (GRAM) TOPICAL ONCE
Qty: 0 | Refills: 0 | Status: COMPLETED | OUTPATIENT
Start: 2017-12-28 | End: 2017-12-28

## 2017-12-28 RX ORDER — LANOLIN ALCOHOL/MO/W.PET/CERES
6 CREAM (GRAM) TOPICAL AT BEDTIME
Qty: 0 | Refills: 0 | Status: DISCONTINUED | OUTPATIENT
Start: 2017-12-28 | End: 2018-01-02

## 2017-12-28 RX ADMIN — OXYCODONE AND ACETAMINOPHEN 1 TABLET(S): 5; 325 TABLET ORAL at 11:42

## 2017-12-28 RX ADMIN — CLOPIDOGREL BISULFATE 75 MILLIGRAM(S): 75 TABLET, FILM COATED ORAL at 10:38

## 2017-12-28 RX ADMIN — HEPARIN SODIUM 5000 UNIT(S): 5000 INJECTION INTRAVENOUS; SUBCUTANEOUS at 13:03

## 2017-12-28 RX ADMIN — Medication 325 MILLIGRAM(S): at 17:00

## 2017-12-28 RX ADMIN — OXYCODONE AND ACETAMINOPHEN 1 TABLET(S): 5; 325 TABLET ORAL at 23:15

## 2017-12-28 RX ADMIN — HEPARIN SODIUM 5000 UNIT(S): 5000 INJECTION INTRAVENOUS; SUBCUTANEOUS at 22:11

## 2017-12-28 RX ADMIN — OXYCODONE AND ACETAMINOPHEN 1 TABLET(S): 5; 325 TABLET ORAL at 12:30

## 2017-12-28 RX ADMIN — Medication 3 MILLIGRAM(S): at 02:37

## 2017-12-28 RX ADMIN — BUDESONIDE AND FORMOTEROL FUMARATE DIHYDRATE 2 PUFF(S): 160; 4.5 AEROSOL RESPIRATORY (INHALATION) at 22:11

## 2017-12-28 RX ADMIN — HEPARIN SODIUM 5000 UNIT(S): 5000 INJECTION INTRAVENOUS; SUBCUTANEOUS at 05:55

## 2017-12-28 RX ADMIN — OXYCODONE AND ACETAMINOPHEN 1 TABLET(S): 5; 325 TABLET ORAL at 22:23

## 2017-12-28 RX ADMIN — CEFTRIAXONE 100 GRAM(S): 500 INJECTION, POWDER, FOR SOLUTION INTRAMUSCULAR; INTRAVENOUS at 17:01

## 2017-12-28 RX ADMIN — Medication 325 MILLIGRAM(S): at 05:56

## 2017-12-28 RX ADMIN — Medication 2.5 MILLIGRAM(S): at 05:55

## 2017-12-28 RX ADMIN — BUDESONIDE AND FORMOTEROL FUMARATE DIHYDRATE 2 PUFF(S): 160; 4.5 AEROSOL RESPIRATORY (INHALATION) at 01:52

## 2017-12-28 RX ADMIN — PANTOPRAZOLE SODIUM 40 MILLIGRAM(S): 20 TABLET, DELAYED RELEASE ORAL at 05:55

## 2017-12-28 RX ADMIN — BUDESONIDE AND FORMOTEROL FUMARATE DIHYDRATE 2 PUFF(S): 160; 4.5 AEROSOL RESPIRATORY (INHALATION) at 10:38

## 2017-12-28 RX ADMIN — Medication 3 MILLIGRAM(S): at 01:54

## 2017-12-28 NOTE — PROGRESS NOTE ADULT - SUBJECTIVE AND OBJECTIVE BOX
t seen and examined. Doing well. [place] brace on. Pain controlled.  No acute events overnight.     O:   Vital Signs Last 24 Hrs  T(C): 36.6 (28 Dec 2017 05:52), Max: 36.8 (27 Dec 2017 14:15)  T(F): 97.8 (28 Dec 2017 05:52), Max: 98.3 (27 Dec 2017 14:15)  HR: 62 (28 Dec 2017 05:52) (59 - 65)  BP: 144/50 (28 Dec 2017 05:52) (123/50 - 150/69)  BP(mean): --  RR: 16 (28 Dec 2017 05:52) (16 - 18)  SpO2: 100% (28 Dec 2017 05:52) (98% - 100%)    Physical Exam  NAD  RLE in [place] with Aquacell dressing, C/D/I  EHL/FHL/TA/GS intact  SILT SP/DP/T/S/S  WWP brisk cap refill    77F s/p stage I revision right knee POD 5  - IV abx and course as per ID  - TTWB RLE in [place] at all times  - Plavix for TAVR ppx,  mg BID and HSQ for DVT ppx  - Care per primary team  - PT/OOB

## 2017-12-28 NOTE — PROGRESS NOTE ADULT - SUBJECTIVE AND OBJECTIVE BOX
Cardiology/Vascular Medicine Inpatient Progress Note    s/p Stage 1 revision infected knee prosthesis  Clinically stable from cardiovascular perspective  Blood cultures NGTD  Reviewed limited TTE performed last night.  No obvious vegetation.  From our perspective, no current need for ERIKA.  Recommend continuing with long-term antibiotic regimen x 6 weeks.  On DAPT  For PICC line today.    Vital Signs Last 24 Hrs  T(C): 36.9 (28 Dec 2017 16:51), Max: 36.9 (28 Dec 2017 16:51)  T(F): 98.5 (28 Dec 2017 16:51), Max: 98.5 (28 Dec 2017 16:51)  HR: 63 (28 Dec 2017 16:51) (59 - 68)  BP: 154/67 (28 Dec 2017 16:51) (123/50 - 154/67)  BP(mean): --  RR: 17 (28 Dec 2017 16:51) (16 - 18)  SpO2: 100% (28 Dec 2017 16:51) (98% - 100%)    Appearance: Normal	  HEENT:   Normal oral mucosa, PERRL, EOMI	  Lymphatic: No lymphadenopathy  Cardiovascular: Normal S1 S2, No JVD, No murmurs, No edema  Respiratory: Lungs clear to auscultation	  Psychiatry: A & O x 3, Mood & affect appropriate  Gastrointestinal:  Soft, Non-tender, + BS	  Skin: No rashes, No ecchymoses, No cyanosis	  Neurologic: Non-focal  Extremities: Normal range of motion, No clubbing, cyanosis, +dressings    MEDICATIONS  (STANDING):  aspirin 325 milliGRAM(s) Oral every 12 hours  buDESOnide 160 MICROgram(s)/formoterol 4.5 MICROgram(s) Inhaler 2 Puff(s) Inhalation two times a day  cefTRIAXone   IVPB      cefTRIAXone   IVPB 2 Gram(s) IV Intermittent every 24 hours  clopidogrel Tablet 75 milliGRAM(s) Oral daily  docusate sodium 100 milliGRAM(s) Oral two times a day  heparin  Injectable 5000 Unit(s) SubCutaneous every 8 hours  pantoprazole    Tablet 40 milliGRAM(s) Oral before breakfast  predniSONE   Tablet 2.5 milliGRAM(s) Oral daily  simvastatin 20 milliGRAM(s) Oral at bedtime    MEDICATIONS  (PRN):  acetaminophen   Tablet. 650 milliGRAM(s) Oral every 6 hours PRN Mild Pain (1 - 3)  melatonin 6 milliGRAM(s) Oral at bedtime PRN Insomnia  nystatin Powder 1 Application(s) Topical two times a day PRN rash  oxyCODONE    5 mG/acetaminophen 325 mG 1 Tablet(s) Oral every 4 hours PRN Moderate Pain (4 - 6)  oxyCODONE    IR 10 milliGRAM(s) Oral two times a day PRN Severe Pain (7 - 10)      LABS:	 	                                  8.3    8.14  )-----------( 230      ( 28 Dec 2017 06:55 )             25.1   12-28    142  |  107  |  37<H>  ----------------------------<  92  4.6   |  23  |  1.13    Ca    7.7<L>      28 Dec 2017 06:55  Phos  3.8     12-28  Mg     2.3     12-28    TPro  6.0  /  Alb  2.7<L>  /  TBili  0.2  /  DBili  x   /  AST  21  /  ALT  18  /  AlkPhos  101  12-27                   < from: Transthoracic Echocardiogram (12.19.17 @ 07:36) >  Patient name: IRASEMA KOHLER  YOB: 1940   Age: 77 (F)   MR#: [id number]  Study Date: 12/19/2017  Location: O/PSonographer: Lauren Finkelstein  Study quality: Technically good  Referring Physician: Roverto Roblero MD  Blood Pressure: 152/63 mmHg  Height: 160 cm  Weight: 120 kg  BSA: 2.2 m2  ------------------------------------------------------------------------  PT/INR - ( 24 Dec 2017 14:47 )   PT: 12.4 SEC;   INR: 1.08          PTT - ( 24 Dec 2017 14:47 )  PTT:31.8 SECPROCEDURE: Transthoracic echocardiogram with 2-D, M-Mode  and complete spectral and color flow Doppler.  INDICATION: Atherosclerotic heart disease of native  coronary artery without angina pectoris (I25.10),  Unspecified diastolic (congestive) heart failure (I50.30)  ------------------------------------------------------------------------  DIMENSIONS:  Dimensions:     Normal Values:  LA:     3.5 cm    2.0 - 4.0 cm  Ao:     2.2 cm    2.0 - 3.8 cm  SEPTUM: 0.9 cm    0.6 - 1.2 cm  PWT:    0.8 cm    0.6 - 1.1 cm  LVIDd:  4.6 cm    3.0 - 5.6 cm  LVIDs:  2.6 cm    1.8 - 4.0 cm  Derived Variables:  LVMI: 59 g/m2  RWT: 0.34  Fractional short: 43 %  Ejection Fraction (Teicholtz): 75 %  ------------------------------------------------------------------------  OBSERVATIONS:  Mitral Valve: Mitral annular calcification and calcified  mitral leaflets with normal diastolic opening. Mild mitral  regurgitation.  Mean transmitral valve gradient equals 5 mm  Hg, consistent with moderate mitral stenosis.  Aortic Root: Normal aortic root.  Aortic Valve: TAVR noted. Peak transaortic valve gradient  equals 27 mm Hg, mean transaorticvalve gradient equals 13  mm Hg, which is probably normal in the presence of a  prosthetic valve. Minimal aortic regurgitation.  Left Atrium: Normal left atrium.  LA volume index = 31  cc/m2.  Left Ventricle: Normal left ventricular systolic function.  No segmental wall motion abnormalities. Normal left  ventricular internal dimensions and wall thicknesses.  Right Heart: Normal right atrium. Normal right ventricular  size and function. Normal tricuspid valve.  Mild tricuspid  regurgitation. Normal pulmonic valve. Minimal pulmonic  regurgitation.  Pericardium/PleuraNormal pericardium with no pericardial  effusion.  Hemodynamic: Estimated right ventricular systolic pressure  equals 41 mm Hg, assuming right atrial pressure equals 10  mm Hg, consistent with mild pulmonary hypertension.  ------------------------------------------------------------------------  CONCLUSIONS:  1. TAVR noted. Peak transaortic valve gradient equals 27 mm  Hg, mean transaortic valve gradient equals 13 mm Hg, which  is probably normal in the presence of a prosthetic valve.  Minimal aortic regurgitation.  2. Normal left ventricular internal dimensions and wall  thicknesses.  3. Normal left ventricular systolic function. No segmental  wall motion abnormalities.  4.Normal right ventricular size and function.  5. Normal tricuspid valve.  Mild tricuspid regurgitation.  6. Estimated pulmonary artery systolic pressure equals 41  mm Hg, assuming right atrial pressure equals 10  mm Hg,  consistent with mild pulmonaryhypertension.  ------------------------------------------------------------------------  Confirmed on  12/20/2017 - 07:16:23 by Roverto Roblero MD,  [facility], [facility], [facility]  ------------------------------------------------------------------------    < end of copied text >    < from: Limited Transthoracic Echo (12.27.17 @ 14:06) >  Patient name: IRASEMA KOHLER  YOB: 1940   Age: 77 (F)   MR#: [id number]  Study Date: 12/27/2017  Location: [id number]: Nora Grovesstein  Study quality: Limited  Referring Physician: Sammy Galvan MD / Garo Sorto MD / Roverto Roblero MD  Blood Pressure: 139/52 mmHg  Height: 157 cm  Weight: 131 kg  BSA: 2.2 m2  ------------------------------------------------------------------------  PROCEDURE: Limited transthoracic echocardiogram with 2-D.  M-Mode and spectral and color flow Doppler.  INDICATION: Endocarditis, valve unspecified (I38)  ------------------------------------------------------------------------  OBSERVATIONS:  Mitral Valve: Mitral annular calcification and calcified  mitral leaflets with decreased diastolic opening. Peak  mitral valve gradient equals 14 mm Hg, mean transmitral  valve gradient equals 6 mm Hg, consistent with moderate  mitral stenosis.  Aortic Root: Normal aortic root, aortic arch and descending  thoracic aorta.  Aortic Valve:Bioprosthetic aortic valve replacement. s/p  TAVR.  Left Atrium: Normal left atrium.  Left Ventricle: Normal left ventricular systolic function.  No segmental wall motion abnormalities. Normal left  ventricular internal dimensions and wall thicknesses.  Right Heart: Normal right atrium. Normal right ventricular  size and function. Normal tricuspid valve. Normal pulmonic  valve.  Pericardium/PleuraNormal pericardium with no pericardial  effusion.  ------------------------------------------------------------------------  CONCLUSIONS:  1. Mitral annular calcification and calcified mitral  leaflets with decreased diastolic opening. Peak mitral  valve gradient equals 14 mm Hg, mean transmitral valve  gradient equals 6 mm Hg, consistent with moderate mitral  stenosis.  2. Bioprosthetic aortic valve replacement. s/p TAVR.  3. Normal left ventricular systolic function. No segmental  wall motion abnormalities.  *** Compared with echocardiogram of 12/19/2017, no  significant changes noted. There isno obvious evidence of  endocarditis.  ------------------------------------------------------------------------  Confirmed on  12/27/2017 - 17:13:04 by Filipe Esparza M.D.  ------------------------------------------------------------------------    < end of copied text >

## 2017-12-28 NOTE — PROGRESS NOTE ADULT - ASSESSMENT
77F with morbid obesity, AS s/p TAVR, bilateral knee OA s/p TKA (2002), polymyalgia on chronic prednisone, admitted for septic arthritis w/bacteremia, now s/p explanation w/spacer placement. Will continue ceftriaxone IV per ID recs. TTE yesterday shows no changes as compared to 12/19 echo. Given need for prolonged abx treatment, PICC line will be placed today. PT will evaluate/work with patient today. Will discuss need for line care and rehabilitation with case management.

## 2017-12-28 NOTE — PROGRESS NOTE ADULT - ASSESSMENT
77y female with remote b/l TKR, TAVR and PPM 1 yr ago, CAD- stent, Coumadin, root canal ~ 1 month ago, admitted with 1 wk of fatigue, and septic R knee (53k WBCs)  s/p I&D, hardware removal, placement of spacer.    Bld Cxs Strep sanguinis in all 4 bottles, most likely odontogenic focus  Synovial fluid with strep sanguinus as well  Underlying TAVR and PPM, hence at risk for valvular or lead endocarditis  TTE noted, no change c/w older study  Plan:  Ceftriaxone 2 g IV q 24- 6 wks via PICC day 5/42; possibly followed by extended oral regimen  Repeat blood cultures cancelled, would repeat if possible just to document bloodstream sterility  ? Rehab facility to complete treatment

## 2017-12-28 NOTE — PROGRESS NOTE ADULT - SUBJECTIVE AND OBJECTIVE BOX
INTERVAL/OVERNIGHT EVENTS:     Overnight, pt required requested oxycodone 10mg PRN for pain. During the day, pt reports percocet was controlling pain adequately. This morning reports little to no pain at rest. She is eager, but anxious to get out of bed today with PT. Otherwise has no concerns this morning.    HPI: 77y female s/p BL total knee replacement presented with infected right knee replacement. Blood cultures positive for strep sp. Underwent explantation of R knee hardware with washout and spacer placement. Patient had significant blood loss and required massive resuscitation. Patient extubated at conclusion of procedure. SICU consulted for continued post-operative resuscitation. Patient maintained on low dose phenylephrine gtt intra-operatively. Admitted to SICU for resuscitation and close monitoring. Transferred back to medicine service once stable on 12/26.       MEDICATIONS  (STANDING):  aspirin 325 milliGRAM(s) Oral every 12 hours  buDESOnide 160 MICROgram(s)/formoterol 4.5 MICROgram(s) Inhaler 2 Puff(s) Inhalation two times a day  cefTRIAXone   IVPB      cefTRIAXone   IVPB 2 Gram(s) IV Intermittent every 24 hours  clopidogrel Tablet 75 milliGRAM(s) Oral daily  docusate sodium 100 milliGRAM(s) Oral two times a day  heparin  Injectable 5000 Unit(s) SubCutaneous every 8 hours  pantoprazole    Tablet 40 milliGRAM(s) Oral before breakfast  predniSONE   Tablet 2.5 milliGRAM(s) Oral daily  simvastatin 20 milliGRAM(s) Oral at bedtime    MEDICATIONS  (PRN):  acetaminophen   Tablet. 650 milliGRAM(s) Oral every 6 hours PRN Mild Pain (1 - 3)  melatonin 6 milliGRAM(s) Oral at bedtime PRN Insomnia  nystatin Powder 1 Application(s) Topical two times a day PRN rash  oxyCODONE    5 mG/acetaminophen 325 mG 1 Tablet(s) Oral every 4 hours PRN Moderate Pain (4 - 6)  oxyCODONE    IR 10 milliGRAM(s) Oral two times a day PRN Severe Pain (7 - 10)    Allergies    No Known Allergies    Intolerances    IV Contrast (Flushing (Skin); Nausea)        All other systems reviewed and negative [ ]     VITAL SIGNS AND PHYSICAL EXAM:  Vital Signs Last 24 Hrs  T(C): 36.6 (28 Dec 2017 05:52), Max: 36.8 (27 Dec 2017 14:15)  T(F): 97.8 (28 Dec 2017 05:52), Max: 98.3 (27 Dec 2017 14:15)  HR: 62 (28 Dec 2017 05:52) (59 - 65)  BP: 144/50 (28 Dec 2017 05:52) (123/50 - 150/69)  BP(mean): --  RR: 16 (28 Dec 2017 05:52) (16 - 18)  SpO2: 100% (28 Dec 2017 05:52) (98% - 100%)  I&O's Summary    27 Dec 2017 07:01  -  28 Dec 2017 07:00  --------------------------------------------------------  IN: 0 mL / OUT: 865 mL / NET: -865 mL    Gen: No acute distress  HEENT: Normocephalic, atraumatic, extraocular movements intact, conjunctiva clear without ictuers  CV: Regular rate and rhythm, no murmurs, rubs, or gallops  Resp: Non-labored, clear to auscultation bilaterally  Abd: soft, non-tender, non-distended  Skin: no rash  Neuro: grossly normal   Ext: brace/dressings on RLE. peripheral pulses 2+ b/l      INTERVAL LAB RESULTS:                        8.3    8.14  )-----------( 230      ( 28 Dec 2017 06:55 )             25.1                         8.9    10.90 )-----------( 180      ( 27 Dec 2017 05:30 )             26.6                         7.8    8.15  )-----------( 109      ( 26 Dec 2017 02:37 )             23.8                               142    |  107    |  37                  Calcium: 7.7   / iCa: x      (12-28 @ 06:55)    ----------------------------<  92        Magnesium: 2.3                              4.6     |  23     |  1.13             Phosphorous: 3.8            INTERVAL IMAGING STUDIES:

## 2017-12-28 NOTE — PROGRESS NOTE ADULT - PROBLEM SELECTOR PLAN 1
-continue IV ceftriaxone, appreciate ID recs  -PICC line placement today 12/28  -Repeat TTE shows no signs of endocarditis.  -continue acetaminophen, percocet for pain PRN

## 2017-12-28 NOTE — OCCUPATIONAL THERAPY INITIAL EVALUATION ADULT - PERTINENT HX OF CURRENT PROBLEM, REHAB EVAL
77F with morbid obesity, HTN, HLD, AS s/p TAVR, asthma, GERD, bilateral knee OA s/p bilateral TKA (2002), polymyalgia on chronic prednisone presents for dyspnea and right knee pain. Pt now s/p Removal of hardware from knee with irrigation, debridement and insertion of antibiotic beads or antibiotic spacer

## 2017-12-28 NOTE — PROGRESS NOTE ADULT - ATTENDING COMMENTS
I agree with the above note and have personally seen and examined this patient. All pertinent films have been reviewed. Please refer to clinical documentation of the history, physical examinations, data summary, and both assessment and plan as documented above and with which I agree.    sutures remove at snf in 3 weeks  outpatient follow up in 4-6 weeks with me, [phone]    Nate Bass MD  Attending Orthopedic Surgeon

## 2017-12-28 NOTE — PROGRESS NOTE ADULT - SUBJECTIVE AND OBJECTIVE BOX
CC: f/u for strep bacteremia and right PJI    Patient reports no specific complaints, difficulty with moving    REVIEW OF SYSTEMS:  All other review of systems negative (Comprehensive ROS)    Antimicrobials Day #  :5/42  cefTRIAXone   IVPB      cefTRIAXone   IVPB 2 Gram(s) IV Intermittent every 24 hours    Other Medications Reviewed    T(F): 98.5 (12-28-17 @ 16:51), Max: 98.5 (12-28-17 @ 16:51)  HR: 63 (12-28-17 @ 16:51)  BP: 154/67 (12-28-17 @ 16:51)  RR: 17 (12-28-17 @ 16:51)  SpO2: 100% (12-28-17 @ 16:51)  Wt(kg): --    PHYSICAL EXAM:  General: alert, no acute distress  Eyes:  anicteric, no conjunctival injection, no discharge  Oropharynx: no lesions or injection 	  Neck: supple, without adenopathy  Lungs: clear to auscultation  Heart: regular rate and rhythm; no murmur, rubs or gallops  Abdomen: soft, nondistended, nontender, without mass or organomegaly  Skin: no lesions.left arm PICC  Extremities: no clubbing, cyanosis, ,trace edema  Neurologic: alert, oriented, moves all extremities  RT knee in brace,wound dressed  LAB RESULTS:                        8.3    8.14  )-----------( 230      ( 28 Dec 2017 06:55 )             25.1     12-28    142  |  107  |  37<H>  ----------------------------<  92  4.6   |  23  |  1.13    Ca    7.7<L>      28 Dec 2017 06:55  Phos  3.8     12-28  Mg     2.3     12-28    TPro  6.0  /  Alb  2.7<L>  /  TBili  0.2  /  DBili  x   /  AST  21  /  ALT  18  /  AlkPhos  101  12-27    LIVER FUNCTIONS - ( 27 Dec 2017 05:30 )  Alb: 2.7 g/dL / Pro: 6.0 g/dL / ALK PHOS: 101 u/L / ALT: 18 u/L / AST: 21 u/L / GGT: x             MICROBIOLOGY:  RECENT CULTURES:  12-24 @ 15:12 BLOOD VENOUS         NO ORGANISMS ISOLATED  NO ORGANISMS ISOLATED AT 96 HOURS  12-24 @ 12:51 BLOOD PERIPHERAL         NO ORGANISMS ISOLATED  NO ORGANISMS ISOLATED AT 96 HOURS      RADIOLOGY REVIEWED:

## 2017-12-28 NOTE — PROGRESS NOTE ADULT - ASSESSMENT
History of AS s/p TAVR  Normal LV function  Mild pulmonary HTN  Probable diastolic dysfunction  Morbid obesity, HTN, hyperlipidemia  Stable from the cardiovascular perspective.    S/P Stage 1 revision infected knee prosthesis  Receiving IV abx per ID  Reviewed limited TTE performed last night -- no obvious vegetation  No current need for ERIKA  Recommend continuing with long-term IV abx x 6 weeks.  She can f/u with for outpatient surveillance TTE.  On aspirin: can reduce to 81 mg daily  On Plavix.

## 2017-12-28 NOTE — PROVIDER CONTACT NOTE (OTHER) - ACTION/TREATMENT ORDERED:
[id number] Eloy made aware, as per team okay to leave in until after PICC line is placed.
Will monitor FS on blood glucose as patient is on steroids at home.

## 2017-12-28 NOTE — CHART NOTE - NSCHARTNOTEFT_GEN_A_CORE
Patient Age: 77    Patient Gender: female    Procedure (including site / side if known): PICC line placement    Diagnosis / Indication: Prolonged antibiotic treatment per ID    Pertinent Medical History:    PAST MEDICAL & SURGICAL HISTORY:  CAD (coronary artery disease): HERBERT to LAD 11/2016  Aortic stenosis, severe  Uncomplicated asthma, unspecified asthma severity  Polymyalgia  Lumbar disc disease with radiculopathy  Spider veins  Anxiety  Severe aortic stenosis by prior echocardiogram: 2013 and  11/2016  Dysphagia  Morbid obesity with BMI of 50.0-59.9, adult  Macular degeneration  Hiatal hernia  GERD (gastroesophageal reflux disease)  Sciatica  Osteoarthritis  H/O cardiac catheterization: 11/29/16 HERBERT placed on LAD  H/O endoscopy: with dilatation of esophageal stricture 2013  S/P cataract surgery: x2; 3-4yr ago  S/P gastroplasty: 28 yrs ago  S/P cholecystectomy: more than 15 years ago  S/P total knee replacement: left, 15yr ago  S/P total knee replacement: right, 12yr ago  S/P hysterectomy: 24yr ago        Pertinent Labs:                          8.3    8.14  )-----------( 230      ( 28 Dec 2017 06:55 )             25.1     12-28    142  |  107  |  37<H>  ----------------------------<  92  4.6   |  23  |  1.13    Ca    7.7<L>      28 Dec 2017 06:55  Phos  3.8     12-28  Mg     2.3     12-28    TPro  6.0  /  Alb  2.7<L>  /  TBili  0.2  /  DBili  x   /  AST  21  /  ALT  18  /  AlkPhos  101  12-27    PT: 12.4  PTT: 31.8  INR: 1.08    Patient and Family aware:   [ X ]Yes   [  ]No.

## 2017-12-28 NOTE — OCCUPATIONAL THERAPY INITIAL EVALUATION ADULT - DIAGNOSIS, OT EVAL
s/p Removal of hardware from knee with irrigation, debridement and insertion of antibiotic beads or antibiotic spacer

## 2017-12-29 DIAGNOSIS — Z29.9 ENCOUNTER FOR PROPHYLACTIC MEASURES, UNSPECIFIED: ICD-10-CM

## 2017-12-29 LAB
BACTERIA BLD CULT: SIGNIFICANT CHANGE UP
BACTERIA BLD CULT: SIGNIFICANT CHANGE UP
BUN SERPL-MCNC: 28 MG/DL — HIGH (ref 7–23)
CALCIUM SERPL-MCNC: 7.9 MG/DL — LOW (ref 8.4–10.5)
CHLORIDE SERPL-SCNC: 107 MMOL/L — SIGNIFICANT CHANGE UP (ref 98–107)
CO2 SERPL-SCNC: 22 MMOL/L — SIGNIFICANT CHANGE UP (ref 22–31)
CREAT SERPL-MCNC: 0.94 MG/DL — SIGNIFICANT CHANGE UP (ref 0.5–1.3)
CULTURE - SURGICAL SITE: SIGNIFICANT CHANGE UP
GLUCOSE SERPL-MCNC: 91 MG/DL — SIGNIFICANT CHANGE UP (ref 70–99)
HCT VFR BLD CALC: 27.6 % — LOW (ref 34.5–45)
HGB BLD-MCNC: 8.9 G/DL — LOW (ref 11.5–15.5)
MAGNESIUM SERPL-MCNC: 2 MG/DL — SIGNIFICANT CHANGE UP (ref 1.6–2.6)
MCHC RBC-ENTMCNC: 29.3 PG — SIGNIFICANT CHANGE UP (ref 27–34)
MCHC RBC-ENTMCNC: 32.2 % — SIGNIFICANT CHANGE UP (ref 32–36)
MCV RBC AUTO: 90.8 FL — SIGNIFICANT CHANGE UP (ref 80–100)
NRBC # FLD: 0 — SIGNIFICANT CHANGE UP
PHOSPHATE SERPL-MCNC: 4.1 MG/DL — SIGNIFICANT CHANGE UP (ref 2.5–4.5)
PLATELET # BLD AUTO: 280 K/UL — SIGNIFICANT CHANGE UP (ref 150–400)
PMV BLD: 10.5 FL — SIGNIFICANT CHANGE UP (ref 7–13)
POTASSIUM SERPL-MCNC: 4.5 MMOL/L — SIGNIFICANT CHANGE UP (ref 3.5–5.3)
POTASSIUM SERPL-SCNC: 4.5 MMOL/L — SIGNIFICANT CHANGE UP (ref 3.5–5.3)
RBC # BLD: 3.04 M/UL — LOW (ref 3.8–5.2)
RBC # FLD: 15.7 % — HIGH (ref 10.3–14.5)
SODIUM SERPL-SCNC: 140 MMOL/L — SIGNIFICANT CHANGE UP (ref 135–145)
WBC # BLD: 9.05 K/UL — SIGNIFICANT CHANGE UP (ref 3.8–10.5)
WBC # FLD AUTO: 9.05 K/UL — SIGNIFICANT CHANGE UP (ref 3.8–10.5)

## 2017-12-29 PROCEDURE — 99233 SBSQ HOSP IP/OBS HIGH 50: CPT | Mod: GC

## 2017-12-29 PROCEDURE — 99232 SBSQ HOSP IP/OBS MODERATE 35: CPT

## 2017-12-29 RX ORDER — ASPIRIN/CALCIUM CARB/MAGNESIUM 324 MG
81 TABLET ORAL DAILY
Qty: 0 | Refills: 0 | Status: DISCONTINUED | OUTPATIENT
Start: 2017-12-29 | End: 2018-01-02

## 2017-12-29 RX ADMIN — Medication 100 MILLIGRAM(S): at 17:41

## 2017-12-29 RX ADMIN — OXYCODONE AND ACETAMINOPHEN 1 TABLET(S): 5; 325 TABLET ORAL at 21:50

## 2017-12-29 RX ADMIN — Medication 2.5 MILLIGRAM(S): at 07:12

## 2017-12-29 RX ADMIN — BUDESONIDE AND FORMOTEROL FUMARATE DIHYDRATE 2 PUFF(S): 160; 4.5 AEROSOL RESPIRATORY (INHALATION) at 21:16

## 2017-12-29 RX ADMIN — CLOPIDOGREL BISULFATE 75 MILLIGRAM(S): 75 TABLET, FILM COATED ORAL at 11:54

## 2017-12-29 RX ADMIN — Medication 6 MILLIGRAM(S): at 01:46

## 2017-12-29 RX ADMIN — Medication 325 MILLIGRAM(S): at 07:12

## 2017-12-29 RX ADMIN — CEFTRIAXONE 100 GRAM(S): 500 INJECTION, POWDER, FOR SOLUTION INTRAMUSCULAR; INTRAVENOUS at 17:41

## 2017-12-29 RX ADMIN — HEPARIN SODIUM 5000 UNIT(S): 5000 INJECTION INTRAVENOUS; SUBCUTANEOUS at 07:13

## 2017-12-29 RX ADMIN — PANTOPRAZOLE SODIUM 40 MILLIGRAM(S): 20 TABLET, DELAYED RELEASE ORAL at 07:13

## 2017-12-29 RX ADMIN — OXYCODONE HYDROCHLORIDE 10 MILLIGRAM(S): 5 TABLET ORAL at 09:04

## 2017-12-29 RX ADMIN — BUDESONIDE AND FORMOTEROL FUMARATE DIHYDRATE 2 PUFF(S): 160; 4.5 AEROSOL RESPIRATORY (INHALATION) at 09:05

## 2017-12-29 RX ADMIN — OXYCODONE AND ACETAMINOPHEN 1 TABLET(S): 5; 325 TABLET ORAL at 21:20

## 2017-12-29 RX ADMIN — HEPARIN SODIUM 5000 UNIT(S): 5000 INJECTION INTRAVENOUS; SUBCUTANEOUS at 14:23

## 2017-12-29 RX ADMIN — OXYCODONE HYDROCHLORIDE 10 MILLIGRAM(S): 5 TABLET ORAL at 10:05

## 2017-12-29 RX ADMIN — Medication 81 MILLIGRAM(S): at 11:53

## 2017-12-29 RX ADMIN — HEPARIN SODIUM 5000 UNIT(S): 5000 INJECTION INTRAVENOUS; SUBCUTANEOUS at 21:17

## 2017-12-29 NOTE — PROGRESS NOTE ADULT - PROBLEM SELECTOR PLAN 7
DVT ppx: HSQ, adjusted for weight   diet: DASH/TLC  dispo: pending rehab placement, liejean Tuesday. Needs to work with PT and demonstrate ambulatory potential today.    Romain Lucio MD  Internal Medicine PGY-1  Pager: [phone]/ [facility] [phone]  After 7 PM on weekdays and 12 PM on weekends page [phone]

## 2017-12-29 NOTE — PROGRESS NOTE ADULT - SUBJECTIVE AND OBJECTIVE BOX
Patient is a 77y old  Female who presents with a chief complaint of Dyspnea and right knee pain (22 Dec 2017 17:42)    dx= septic knee    SUBJECTIVE / OVERNIGHT EVENTS: Patient says "I feel depressed, I was crying all night". She is upset because she spent many months to years taking caqre of her sick  and is frustrated with lying in bed and not being able to participate in PT. She says "my legs just don't work" and that the PT instructions given to her are not helpful because she weighs too much to walk on her tiptoe.    MEDICATIONS  (STANDING):  aspirin enteric coated 81 milliGRAM(s) Oral daily  buDESOnide 160 MICROgram(s)/formoterol 4.5 MICROgram(s) Inhaler 2 Puff(s) Inhalation two times a day  cefTRIAXone   IVPB      cefTRIAXone   IVPB 2 Gram(s) IV Intermittent every 24 hours  clopidogrel Tablet 75 milliGRAM(s) Oral daily  docusate sodium 100 milliGRAM(s) Oral two times a day  heparin  Injectable 5000 Unit(s) SubCutaneous every 8 hours  pantoprazole    Tablet 40 milliGRAM(s) Oral before breakfast  predniSONE   Tablet 2.5 milliGRAM(s) Oral daily  simvastatin 20 milliGRAM(s) Oral at bedtime    MEDICATIONS  (PRN):  acetaminophen   Tablet. 650 milliGRAM(s) Oral every 6 hours PRN Mild Pain (1 - 3)  melatonin 6 milliGRAM(s) Oral at bedtime PRN Insomnia  nystatin Powder 1 Application(s) Topical two times a day PRN rash  oxyCODONE    5 mG/acetaminophen 325 mG 1 Tablet(s) Oral every 4 hours PRN Moderate Pain (4 - 6)  oxyCODONE    IR 10 milliGRAM(s) Oral two times a day PRN Severe Pain (7 - 10)    Vitals  T(C): 37.2 (12-29-17 @ 07:04), Max: 37.2 (12-29-17 @ 07:04)  HR: 75 (12-29-17 @ 07:04) (63 - 76)  BP: 155/60 (12-29-17 @ 07:04) (140/60 - 155/60)  RR: 17 (12-29-17 @ 07:04) (17 - 18)  SpO2: 99% (12-29-17 @ 07:04) (95% - 100%)    PHYSICAL EXAM  GENERAL: NAD, well-developed obese female lying in bed  NEURO: AO x3, PERRLA, EOMI, motor strength in tact in 3/4 extremities, however cannot lift RLE. Sensation in tact  HEAD:  Atraumatic, Normocephalic  EYES: conjunctiva and sclera clear  NECK: Supple, No JVD, no lymphadenopathy, no thyromegaly  CHEST/LUNG: Clear to auscultation bilaterally; No wheezes, rales or rhonchi  HEART: Regular rate and rhythm; No murmurs, rubs, or gallops  ABDOMEN: Soft, Nontender, Nondistended; Bowel sounds present, no masses.  EXTREMITIES:  2+ Peripheral Pulses, No clubbing, cyanosis, or edema  SKIN: Warm, dry, in tact, no rashes or lesions, ecchymotic on arms near IV sites.   PSYCH: affect appropriate    LABS:                        8.9    9.05  )-----------( 280      ( 29 Dec 2017 07:30 )             27.6     12-29    140  |  107  |  28<H>  ----------------------------<  91  4.5   |  22  |  0.94    Ca    7.9<L>      29 Dec 2017 07:30  Phos  4.1     12-29  Mg     2.0     12-29    I&O's Summary    28 Dec 2017 07:01  -  29 Dec 2017 07:00  --------------------------------------------------------  IN: 0 mL / OUT: 1725 mL / NET: -1725 mL    Romain Lucio MD  Internal Medicine PGY-1  Pager: [phone]/ [place] [phone]  After 7 PM on weekdays and 12 PM on weekends page [phone]

## 2017-12-29 NOTE — PROGRESS NOTE ADULT - ASSESSMENT
77y female with remote b/l TKR, TAVR and PPM 1 yr ago, CAD- stent, Coumadin, root canal ~ 1 month ago, admitted with 1 wk of fatigue, and septic R knee (53k WBCs)  s/p I&D, hardware removal, placement of spacer.    Bld Cxs Strep sanguinis in all 4 bottles, most likely odontogenic focus  Synovial fluid with strep sanguinus as well  Underlying TAVR and PPM, hence at risk for valvular or lead endocarditis  TTE noted, no change c/w older study  Plan:  Ceftriaxone 2 g IV q 24- 6 wks via PICC day 6/42; possibly followed by extended oral regimen  She is tolerating antibiotics well  Disposition per primary team, favor supervised care

## 2017-12-29 NOTE — PROGRESS NOTE ADULT - ATTENDING COMMENTS
77 y.o. Female w/ hx morbid obesity, AS s/p TAVR, bilateral knee OA s/p TKA (2002), polymyalgia on chronic prednisone, admitted for right knee septic arthritis w/ bacteremia, now s/p removal of right knee hardware w/ cement spacer placement. Will continue ceftriaxone IV per ID Day #6 of 42 days. TTE yesterday shows no changes as compared to 12/19 echo. Given need for prolonged abx treatment, PICC line will be placed today. Patient for rehab but not adequeately participating with PT to go to rehab. She must be able to stand and walk a few feet before qualifying. Spoke to Ortho and had nothing further to add. She should f/u with ortho in 6 weeks for stage 2 surgery. 77 y.o. Female w/ hx morbid obesity, AS s/p TAVR, bilateral knee OA s/p TKA (2002), polymyalgia on chronic prednisone, admitted for right knee septic arthritis w/  strept sanguinous bacteremia, now s/p removal of right knee hardware w/ cement spacer placement. Surgery c/w postop wound bleeding since was on ASA/Plavix requiring 9 Units PRBCs, 2 FFP and 1 PLTs now resolved.  Will continue ceftriaxone IV per ID Day #6 of 42 days. TTE shows no changes as compared to 12/19 echo. Given need for prolonged abx treatment, PICC line will be placed today. Patient for rehab but not adequeately participating with PT to go to rehab. She must be able to stand and walk a few feet before qualifying. Spoke to Ortho and had nothing further to add. She should f/u with ortho in 6 weeks for stage 2 surgery.

## 2017-12-29 NOTE — PROGRESS NOTE ADULT - SUBJECTIVE AND OBJECTIVE BOX
CC: f/u for PJI with strep and bacteremia    Patient reports no complaints,she is comfortable at present.    REVIEW OF SYSTEMS:  All other review of systems negative (Comprehensive ROS)    Antimicrobials Day #  :6/42  cefTRIAXone   IVPB      cefTRIAXone   IVPB 2 Gram(s) IV Intermittent every 24 hours    Other Medications Reviewed    T(F): 98.4 (12-29-17 @ 14:07), Max: 99.9 (12-29-17 @ 09:19)  HR: 75 (12-29-17 @ 14:07)  BP: 148/62 (12-29-17 @ 14:07)  RR: 18 (12-29-17 @ 14:07)  SpO2: 100% (12-29-17 @ 14:07)  Wt(kg): --    PHYSICAL EXAM:  General: alert, no acute distress  Eyes:  anicteric, no conjunctival injection, no discharge  Oropharynx: no lesions or injection 	  Neck: supple, without adenopathy  Lungs: clear to auscultation  Heart: regular rate and rhythm; no murmur, rubs or gallops  Abdomen: soft, nondistended, nontender, without mass or organomegaly  Skin: no lesions  Extremities: no clubbing, cyanosis, trace edema.Rt knee in brace and dressed,dressing is dry  Neurologic: alert, oriented, moves all extremities  RT arm PICC line exit okay  LAB RESULTS:                        8.9    9.05  )-----------( 280      ( 29 Dec 2017 07:30 )             27.6     12-29    140  |  107  |  28<H>  ----------------------------<  91  4.5   |  22  |  0.94    Ca    7.9<L>      29 Dec 2017 07:30  Phos  4.1     12-29  Mg     2.0     12-29          MICROBIOLOGY:  RECENT CULTURES:      RADIOLOGY REVIEWED:

## 2017-12-29 NOTE — PROGRESS NOTE ADULT - SUBJECTIVE AND OBJECTIVE BOX
Cardiology/Vascular Medicine Inpatient Progress Note    s/p Stage 1 revision infected knee prosthesis  Clinically stable from cardiovascular perspective  Blood cultures NGTD  Reviewed limited TTE performed last night.  No obvious vegetation.  From our perspective, no current need for ERIKA.  Recommend continuing with long-term antibiotic regimen x 6 weeks.  On DAPT    Vital Signs Last 24 Hrs  T(C): 37.2 (29 Dec 2017 07:04), Max: 37.2 (29 Dec 2017 07:04)  T(F): 98.9 (29 Dec 2017 07:04), Max: 98.9 (29 Dec 2017 07:04)  HR: 75 (29 Dec 2017 07:04) (63 - 76)  BP: 155/60 (29 Dec 2017 07:04) (140/60 - 155/60)  BP(mean): --  RR: 17 (29 Dec 2017 07:04) (17 - 18)  SpO2: 99% (29 Dec 2017 07:04) (95% - 100%)    Appearance: Normal	  HEENT:   Normal oral mucosa, PERRL, EOMI	  Lymphatic: No lymphadenopathy  Cardiovascular: Normal S1 S2, No JVD, No murmurs, No edema  Respiratory: Lungs clear to auscultation	  Psychiatry: A & O x 3, Mood & affect appropriate  Gastrointestinal:  Soft, Non-tender, + BS	  Skin: No rashes, No ecchymoses, No cyanosis	  Neurologic: Non-focal  Extremities: Normal range of motion, No clubbing, cyanosis, +dressings    MEDICATIONS  (STANDING):  aspirin enteric coated 81 milliGRAM(s) Oral daily  buDESOnide 160 MICROgram(s)/formoterol 4.5 MICROgram(s) Inhaler 2 Puff(s) Inhalation two times a day  cefTRIAXone   IVPB      cefTRIAXone   IVPB 2 Gram(s) IV Intermittent every 24 hours  clopidogrel Tablet 75 milliGRAM(s) Oral daily  docusate sodium 100 milliGRAM(s) Oral two times a day  heparin  Injectable 5000 Unit(s) SubCutaneous every 8 hours  pantoprazole    Tablet 40 milliGRAM(s) Oral before breakfast  predniSONE   Tablet 2.5 milliGRAM(s) Oral daily  simvastatin 20 milliGRAM(s) Oral at bedtime    MEDICATIONS  (PRN):  acetaminophen   Tablet. 650 milliGRAM(s) Oral every 6 hours PRN Mild Pain (1 - 3)  melatonin 6 milliGRAM(s) Oral at bedtime PRN Insomnia  nystatin Powder 1 Application(s) Topical two times a day PRN rash  oxyCODONE    5 mG/acetaminophen 325 mG 1 Tablet(s) Oral every 4 hours PRN Moderate Pain (4 - 6)  oxyCODONE    IR 10 milliGRAM(s) Oral two times a day PRN Severe Pain (7 - 10)      LABS:	 	                          8.9    9.05  )-----------( 280      ( 29 Dec 2017 07:30 )             27.6   12-29    140  |  107  |  28<H>  ----------------------------<  91  4.5   |  22  |  0.94    Ca    7.9<L>      29 Dec 2017 07:30  Phos  4.1     12-29  Mg     2.0     12-29                     < from: Transthoracic Echocardiogram (12.19.17 @ 07:36) >  Patient name: IRASEMA KOHLER  YOB: 1940   Age: 77 (F)   MR#: [id number]  Study Date: 12/19/2017  Location: O/PSonographer: Lauren Finkelstein  Study quality: Technically good  Referring Physician: Roverto Roblero MD  Blood Pressure: 152/63 mmHg  Height: 160 cm  Weight: 120 kg  BSA: 2.2 m2  ------------------------------------------------------------------------  PT/INR - ( 24 Dec 2017 14:47 )   PT: 12.4 SEC;   INR: 1.08          PTT - ( 24 Dec 2017 14:47 )  PTT:31.8 SECPROCEDURE: Transthoracic echocardiogram with 2-D, M-Mode  and complete spectral and color flow Doppler.  INDICATION: Atherosclerotic heart disease of native  coronary artery without angina pectoris (I25.10),  Unspecified diastolic (congestive) heart failure (I50.30)  ------------------------------------------------------------------------  DIMENSIONS:  Dimensions:     Normal Values:  LA:     3.5 cm    2.0 - 4.0 cm  Ao:     2.2 cm    2.0 - 3.8 cm  SEPTUM: 0.9 cm    0.6 - 1.2 cm  PWT:    0.8 cm    0.6 - 1.1 cm  LVIDd:  4.6 cm    3.0 - 5.6 cm  LVIDs:  2.6 cm    1.8 - 4.0 cm  Derived Variables:  LVMI: 59 g/m2  RWT: 0.34  Fractional short: 43 %  Ejection Fraction (Teicholtz): 75 %  ------------------------------------------------------------------------  OBSERVATIONS:  Mitral Valve: Mitral annular calcification and calcified  mitral leaflets with normal diastolic opening. Mild mitral  regurgitation.  Mean transmitral valve gradient equals 5 mm  Hg, consistent with moderate mitral stenosis.  Aortic Root: Normal aortic root.  Aortic Valve: TAVR noted. Peak transaortic valve gradient  equals 27 mm Hg, mean transaorticvalve gradient equals 13  mm Hg, which is probably normal in the presence of a  prosthetic valve. Minimal aortic regurgitation.  Left Atrium: Normal left atrium.  LA volume index = 31  cc/m2.  Left Ventricle: Normal left ventricular systolic function.  No segmental wall motion abnormalities. Normal left  ventricular internal dimensions and wall thicknesses.  Right Heart: Normal right atrium. Normal right ventricular  size and function. Normal tricuspid valve.  Mild tricuspid  regurgitation. Normal pulmonic valve. Minimal pulmonic  regurgitation.  Pericardium/PleuraNormal pericardium with no pericardial  effusion.  Hemodynamic: Estimated right ventricular systolic pressure  equals 41 mm Hg, assuming right atrial pressure equals 10  mm Hg, consistent with mild pulmonary hypertension.  ------------------------------------------------------------------------  CONCLUSIONS:  1. TAVR noted. Peak transaortic valve gradient equals 27 mm  Hg, mean transaortic valve gradient equals 13 mm Hg, which  is probably normal in the presence of a prosthetic valve.  Minimal aortic regurgitation.  2. Normal left ventricular internal dimensions and wall  thicknesses.  3. Normal left ventricular systolic function. No segmental  wall motion abnormalities.  4.Normal right ventricular size and function.  5. Normal tricuspid valve.  Mild tricuspid regurgitation.  6. Estimated pulmonary artery systolic pressure equals 41  mm Hg, assuming right atrial pressure equals 10  mm Hg,  consistent with mild pulmonaryhypertension.  ------------------------------------------------------------------------  Confirmed on  12/20/2017 - 07:16:23 by Roverto Roblero MD,  [facility], [facility], [facility]  ------------------------------------------------------------------------    < end of copied text >    < from: Limited Transthoracic Echo (12.27.17 @ 14:06) >  Patient name: IRASEMA KOHLER  YOB: 1940   Age: 77 (F)   MR#: [id number]  Study Date: 12/27/2017  Location: [id number]: Lauren Finkelstein  Study quality: Limited  Referring Physician: Sammy Galvan MD / Garo Sorto MD / Roverto Roblero MD  Blood Pressure: 139/52 mmHg  Height: 157 cm  Weight: 131 kg  BSA: 2.2 m2  ------------------------------------------------------------------------  PROCEDURE: Limited transthoracic echocardiogram with 2-D.  M-Mode and spectral and color flow Doppler.  INDICATION: Endocarditis, valve unspecified (I38)  ------------------------------------------------------------------------  OBSERVATIONS:  Mitral Valve: Mitral annular calcification and calcified  mitral leaflets with decreased diastolic opening. Peak  mitral valve gradient equals 14 mm Hg, mean transmitral  valve gradient equals 6 mm Hg, consistent with moderate  mitral stenosis.  Aortic Root: Normal aortic root, aortic arch and descending  thoracic aorta.  Aortic Valve:Bioprosthetic aortic valve replacement. s/p  TAVR.  Left Atrium: Normal left atrium.  Left Ventricle: Normal left ventricular systolic function.  No segmental wall motion abnormalities. Normal left  ventricular internal dimensions and wall thicknesses.  Right Heart: Normal right atrium. Normal right ventricular  size and function. Normal tricuspid valve. Normal pulmonic  valve.  Pericardium/PleuraNormal pericardium with no pericardial  effusion.  ------------------------------------------------------------------------  CONCLUSIONS:  1. Mitral annular calcification and calcified mitral  leaflets with decreased diastolic opening. Peak mitral  valve gradient equals 14 mm Hg, mean transmitral valve  gradient equals 6 mm Hg, consistent with moderate mitral  stenosis.  2. Bioprosthetic aortic valve replacement. s/p TAVR.  3. Normal left ventricular systolic function. No segmental  wall motion abnormalities.  *** Compared with echocardiogram of 12/19/2017, no  significant changes noted. There isno obvious evidence of  endocarditis.  ------------------------------------------------------------------------  Confirmed on  12/27/2017 - 17:13:04 by Filipe Esparza M.D.  ------------------------------------------------------------------------    < end of copied text >

## 2017-12-29 NOTE — PROGRESS NOTE ADULT - PROBLEM SELECTOR PLAN 1
-continue IV ceftriaxone, appreciate ID recs  -PICC line placement yesterday without complication  -Repeat TTE shows no signs of endocarditis.  -continue acetaminophen, percocet for pain PRN

## 2017-12-29 NOTE — PROGRESS NOTE ADULT - SUBJECTIVE AND OBJECTIVE BOX
Patient is seen and examined. Denies CP/SOB/DIzziness/N/V/D/HA. Pain is controlled.     Vital Signs Last 24 Hrs  T(C): 37 (29 Dec 2017 01:58), Max: 37 (29 Dec 2017 01:58)  T(F): 98.6 (29 Dec 2017 01:58), Max: 98.6 (29 Dec 2017 01:58)  HR: 76 (29 Dec 2017 01:58) (63 - 76)  BP: 147/52 (29 Dec 2017 01:58) (140/60 - 154/67)  BP(mean): --  RR: 18 (29 Dec 2017 01:58) (17 - 18)  SpO2: 95% (29 Dec 2017 01:58) (95% - 100%)    Gen: NAD    RLE: [place] on, Aquacel C/D/I   Moving toes distally. Motor intact EHL/FHL/TA/GA. Sensation is grossly intact to light touch. Compartments are soft, extremities are warm. DP 1+ RLE.    Labs:                        8.3    8.14  )-----------( 230      ( 28 Dec 2017 06:55 )             25.1       12-28    142  |  107  |  37<H>  ----------------------------<  92  4.6   |  23  |  1.13    Ca    7.7<L>      28 Dec 2017 06:55  Phos  3.8     12-28  Mg     2.3     12-28        A/P: Patient is a 77y Female s/p R stage 1 revision     - Pain control / Analgesia  -Antibiotic Ceftriaxone  - [place] 0-90 degrees at all time  - Anticoagulation ASA / Plavix  -PT/OT  - TTWB at all times  - Sutures out in 3 weeks  -FU with Dr Bass in 4 weeks from discharge. Call office to make an appointment. [phone]  -Inc Spirometry  -Notify Ortho with any questions

## 2017-12-30 DIAGNOSIS — E66.01 MORBID (SEVERE) OBESITY DUE TO EXCESS CALORIES: ICD-10-CM

## 2017-12-30 DIAGNOSIS — I50.32 CHRONIC DIASTOLIC (CONGESTIVE) HEART FAILURE: ICD-10-CM

## 2017-12-30 LAB
BUN SERPL-MCNC: 18 MG/DL — SIGNIFICANT CHANGE UP (ref 7–23)
CALCIUM SERPL-MCNC: 7.9 MG/DL — LOW (ref 8.4–10.5)
CHLORIDE SERPL-SCNC: 103 MMOL/L — SIGNIFICANT CHANGE UP (ref 98–107)
CO2 SERPL-SCNC: 26 MMOL/L — SIGNIFICANT CHANGE UP (ref 22–31)
CREAT SERPL-MCNC: 0.89 MG/DL — SIGNIFICANT CHANGE UP (ref 0.5–1.3)
GLUCOSE SERPL-MCNC: 110 MG/DL — HIGH (ref 70–99)
HCT VFR BLD CALC: 25.7 % — LOW (ref 34.5–45)
HGB BLD-MCNC: 8.3 G/DL — LOW (ref 11.5–15.5)
MAGNESIUM SERPL-MCNC: 1.7 MG/DL — SIGNIFICANT CHANGE UP (ref 1.6–2.6)
MCHC RBC-ENTMCNC: 28.2 PG — SIGNIFICANT CHANGE UP (ref 27–34)
MCHC RBC-ENTMCNC: 32.3 % — SIGNIFICANT CHANGE UP (ref 32–36)
MCV RBC AUTO: 87.4 FL — SIGNIFICANT CHANGE UP (ref 80–100)
NRBC # FLD: 0 — SIGNIFICANT CHANGE UP
PHOSPHATE SERPL-MCNC: 4 MG/DL — SIGNIFICANT CHANGE UP (ref 2.5–4.5)
PLATELET # BLD AUTO: 279 K/UL — SIGNIFICANT CHANGE UP (ref 150–400)
PMV BLD: 10.5 FL — SIGNIFICANT CHANGE UP (ref 7–13)
POTASSIUM SERPL-MCNC: 4.2 MMOL/L — SIGNIFICANT CHANGE UP (ref 3.5–5.3)
POTASSIUM SERPL-SCNC: 4.2 MMOL/L — SIGNIFICANT CHANGE UP (ref 3.5–5.3)
RBC # BLD: 2.94 M/UL — LOW (ref 3.8–5.2)
RBC # FLD: 15.5 % — HIGH (ref 10.3–14.5)
SODIUM SERPL-SCNC: 140 MMOL/L — SIGNIFICANT CHANGE UP (ref 135–145)
SPECIMEN SOURCE: SIGNIFICANT CHANGE UP
WBC # BLD: 9.6 K/UL — SIGNIFICANT CHANGE UP (ref 3.8–10.5)
WBC # FLD AUTO: 9.6 K/UL — SIGNIFICANT CHANGE UP (ref 3.8–10.5)

## 2017-12-30 PROCEDURE — 99232 SBSQ HOSP IP/OBS MODERATE 35: CPT

## 2017-12-30 PROCEDURE — 99233 SBSQ HOSP IP/OBS HIGH 50: CPT | Mod: GC

## 2017-12-30 RX ADMIN — OXYCODONE AND ACETAMINOPHEN 1 TABLET(S): 5; 325 TABLET ORAL at 11:35

## 2017-12-30 RX ADMIN — OXYCODONE AND ACETAMINOPHEN 1 TABLET(S): 5; 325 TABLET ORAL at 22:20

## 2017-12-30 RX ADMIN — Medication 81 MILLIGRAM(S): at 11:35

## 2017-12-30 RX ADMIN — CEFTRIAXONE 100 GRAM(S): 500 INJECTION, POWDER, FOR SOLUTION INTRAMUSCULAR; INTRAVENOUS at 18:50

## 2017-12-30 RX ADMIN — Medication 2.5 MILLIGRAM(S): at 05:21

## 2017-12-30 RX ADMIN — BUDESONIDE AND FORMOTEROL FUMARATE DIHYDRATE 2 PUFF(S): 160; 4.5 AEROSOL RESPIRATORY (INHALATION) at 21:30

## 2017-12-30 RX ADMIN — OXYCODONE AND ACETAMINOPHEN 1 TABLET(S): 5; 325 TABLET ORAL at 21:30

## 2017-12-30 RX ADMIN — HEPARIN SODIUM 5000 UNIT(S): 5000 INJECTION INTRAVENOUS; SUBCUTANEOUS at 15:16

## 2017-12-30 RX ADMIN — BUDESONIDE AND FORMOTEROL FUMARATE DIHYDRATE 2 PUFF(S): 160; 4.5 AEROSOL RESPIRATORY (INHALATION) at 09:55

## 2017-12-30 RX ADMIN — HEPARIN SODIUM 5000 UNIT(S): 5000 INJECTION INTRAVENOUS; SUBCUTANEOUS at 21:30

## 2017-12-30 RX ADMIN — HEPARIN SODIUM 5000 UNIT(S): 5000 INJECTION INTRAVENOUS; SUBCUTANEOUS at 05:21

## 2017-12-30 RX ADMIN — CLOPIDOGREL BISULFATE 75 MILLIGRAM(S): 75 TABLET, FILM COATED ORAL at 11:35

## 2017-12-30 RX ADMIN — OXYCODONE AND ACETAMINOPHEN 1 TABLET(S): 5; 325 TABLET ORAL at 12:17

## 2017-12-30 RX ADMIN — Medication 6 MILLIGRAM(S): at 02:50

## 2017-12-30 RX ADMIN — PANTOPRAZOLE SODIUM 40 MILLIGRAM(S): 20 TABLET, DELAYED RELEASE ORAL at 05:21

## 2017-12-30 RX ADMIN — SIMVASTATIN 20 MILLIGRAM(S): 20 TABLET, FILM COATED ORAL at 21:30

## 2017-12-30 NOTE — PROGRESS NOTE ADULT - ATTENDING COMMENTS
Patient seen and examined. She will attempt to walk with PT again today although participation has been limited due to pain and inability to bear weight on affected knee. Otherwise medically stable for discharge pending placement to [facility]. C/w IV antibiotics. Cards/ID/ortho recs appreciated.     Further details of plan per resident note above

## 2017-12-30 NOTE — PROGRESS NOTE ADULT - ASSESSMENT
77F with morbid obesity, AS s/p TAVR, bilateral knee OA s/p TKA (2002), polymyalgia on chronic prednisone, admitted for septic arthritis w/bacteremia, now s/p explanation w/spacer placement. Will continue ceftriaxone IV per ID recs, day 7/42 today. PT will evaluate/work with patient today. Will discuss need for line care and rehabilitation with case management. 77F with morbid obesity, AS s/p TAVR, bilateral knee OA s/p TKA (2002), polymyalgia on chronic prednisone, admitted for septic arthritis w/strep sanguinous bacteremia, now s/p explanation w/spacer placement. Will continue ceftriaxone IV per ID recs, day 7/42 today. PT will evaluate/work with patient today. Will discuss need for line care and rehabilitation with case management.

## 2017-12-30 NOTE — PROGRESS NOTE ADULT - SUBJECTIVE AND OBJECTIVE BOX
CC: f/u for strep bacteremia and septic Rt PJI    Patient reports frustration with inability to get out of bed.    REVIEW OF SYSTEMS:  All other review of systems negative (Comprehensive ROS)    Antimicrobials Day #  :7  cefTRIAXone   IVPB      cefTRIAXone   IVPB 2 Gram(s) IV Intermittent every 24 hours    Other Medications Reviewed    T(F): 99.2 (12-30-17 @ 09:31), Max: 99.8 (12-30-17 @ 05:17)  HR: 72 (12-30-17 @ 09:31)  BP: 144/41 (12-30-17 @ 09:31)  RR: 21 (12-30-17 @ 09:31)  SpO2: 100% (12-30-17 @ 09:31)  Wt(kg): --    PHYSICAL EXAM:  General: alert, no acute distress  Eyes:  anicteric, no conjunctival injection, no discharge  Oropharynx: no lesions or injection 	  Neck: supple, without adenopathy  Lungs: clear to auscultation,diminished at bases  Heart: regular rate and rhythm; no murmur, rubs or gallops  Abdomen: soft, nondistended, nontender, without mass or organomegaly  Skin: no lesions  Extremities: no clubbing, cyanosis, + edema  Neurologic: alert, oriented, moves all extremities  RT knee incision is dressed  LAB RESULTS:                        8.3    9.60  )-----------( 279      ( 30 Dec 2017 07:45 )             25.7     12-29    140  |  107  |  28<H>  ----------------------------<  91  4.5   |  22  |  0.94    Ca    7.9<L>      29 Dec 2017 07:30  Phos  4.1     12-29  Mg     2.0     12-29          MICROBIOLOGY:  RECENT CULTURES:      RADIOLOGY REVIEWED:

## 2017-12-30 NOTE — PROGRESS NOTE ADULT - ASSESSMENT
77y female with remote b/l TKR, TAVR and PPM 1 yr ago, CAD- stent, Coumadin, root canal ~ 1 month ago, admitted with 1 wk of fatigue, and septic R knee (53k WBCs)  s/p I&D, hardware removal, placement of spacer.    Bld Cxs Strep sanguinis in all 4 bottles, most likely odontogenic focus  Synovial fluid with strep sanguinus as well  Underlying TAVR and PPM, hence at risk for valvular or lead endocarditis  TTE noted, no change c/w older study  Low grade temp elevations noted,patient subjectively not aware  Plan:  Ceftriaxone 2 g IV q 24- 6 wks via PICC day 7/42; possibly followed by extended oral regimen  She is tolerating antibiotics well, no drug side effects  Weekly cbc,diff,cmp,esr,and crp upon discharge  follow low grade temps conservatively  Disposition per primary team, favor supervised care

## 2017-12-30 NOTE — PROGRESS NOTE ADULT - SUBJECTIVE AND OBJECTIVE BOX
Cardiology/Vascular Medicine Inpatient Progress Note    s/p Stage 1 revision infected knee prosthesis  Clinically stable from cardiovascular perspective  Blood cultures NGTD  Reviewed limited TTE performed last night.  No obvious vegetation.  From our perspective, no current need for ERIKA.  Recommend continuing with long-term antibiotic regimen x 6 weeks.  On DAPT    Vital Signs Last 24 Hrs  T(C): 37.1 (30 Dec 2017 11:33), Max: 37.7 (30 Dec 2017 05:17)  T(F): 98.8 (30 Dec 2017 11:33), Max: 99.8 (30 Dec 2017 05:17)  HR: 70 (30 Dec 2017 11:33) (70 - 88)  BP: 127/47 (30 Dec 2017 11:33) (127/47 - 169/56)  BP(mean): --  RR: 20 (30 Dec 2017 11:33) (17 - 21)  SpO2: 98% (30 Dec 2017 11:33) (96% - 100%)    Appearance: Normal	  HEENT:   Normal oral mucosa, PERRL, EOMI	  Lymphatic: No lymphadenopathy  Cardiovascular: Normal S1 S2, No JVD, No murmurs, No edema  Respiratory: Lungs clear to auscultation	  Psychiatry: A & O x 3, Mood & affect appropriate  Gastrointestinal:  Soft, Non-tender, + BS	  Skin: No rashes, No ecchymoses, No cyanosis	  Neurologic: Non-focal  Extremities: Normal range of motion, No clubbing, cyanosis, +dressings    MEDICATIONS  (STANDING):  aspirin enteric coated 81 milliGRAM(s) Oral daily  buDESOnide 160 MICROgram(s)/formoterol 4.5 MICROgram(s) Inhaler 2 Puff(s) Inhalation two times a day  cefTRIAXone   IVPB      cefTRIAXone   IVPB 2 Gram(s) IV Intermittent every 24 hours  clopidogrel Tablet 75 milliGRAM(s) Oral daily  docusate sodium 100 milliGRAM(s) Oral two times a day  heparin  Injectable 5000 Unit(s) SubCutaneous every 8 hours  pantoprazole    Tablet 40 milliGRAM(s) Oral before breakfast  predniSONE   Tablet 2.5 milliGRAM(s) Oral daily  simvastatin 20 milliGRAM(s) Oral at bedtime    MEDICATIONS  (PRN):  acetaminophen   Tablet. 650 milliGRAM(s) Oral every 6 hours PRN Mild Pain (1 - 3)  melatonin 6 milliGRAM(s) Oral at bedtime PRN Insomnia  nystatin Powder 1 Application(s) Topical two times a day PRN rash  oxyCODONE    5 mG/acetaminophen 325 mG 1 Tablet(s) Oral every 4 hours PRN Moderate Pain (4 - 6)  oxyCODONE    IR 10 milliGRAM(s) Oral two times a day PRN Severe Pain (7 - 10)      LABS:	 	                                   8.3    9.60  )-----------( 279      ( 30 Dec 2017 07:45 )             25.7   12-30    140  |  103  |  18  ----------------------------<  110<H>  4.2   |  26  |  0.89    Ca    7.9<L>      30 Dec 2017 07:45  Phos  4.0     12-30  Mg     1.7     12-30                     < from: Transthoracic Echocardiogram (12.19.17 @ 07:36) >  Patient name: IRASEMA KOHLER  YOB: 1940   Age: 77 (F)   MR#: [id number]  Study Date: 12/19/2017  Location: O/PSonographer: Lauren Finkelstein  Study quality: Technically good  Referring Physician: Roverto Roblero MD  Blood Pressure: 152/63 mmHg  Height: 160 cm  Weight: 120 kg  BSA: 2.2 m2  ------------------------------------------------------------------------  PT/INR - ( 24 Dec 2017 14:47 )   PT: 12.4 SEC;   INR: 1.08          PTT - ( 24 Dec 2017 14:47 )  PTT:31.8 SECPROCEDURE: Transthoracic echocardiogram with 2-D, M-Mode  and complete spectral and color flow Doppler.  INDICATION: Atherosclerotic heart disease of native  coronary artery without angina pectoris (I25.10),  Unspecified diastolic (congestive) heart failure (I50.30)  ------------------------------------------------------------------------  DIMENSIONS:  Dimensions:     Normal Values:  LA:     3.5 cm    2.0 - 4.0 cm  Ao:     2.2 cm    2.0 - 3.8 cm  SEPTUM: 0.9 cm    0.6 - 1.2 cm  PWT:    0.8 cm    0.6 - 1.1 cm  LVIDd:  4.6 cm    3.0 - 5.6 cm  LVIDs:  2.6 cm    1.8 - 4.0 cm  Derived Variables:  LVMI: 59 g/m2  RWT: 0.34  Fractional short: 43 %  Ejection Fraction (Teicholtz): 75 %  ------------------------------------------------------------------------  OBSERVATIONS:  Mitral Valve: Mitral annular calcification and calcified  mitral leaflets with normal diastolic opening. Mild mitral  regurgitation.  Mean transmitral valve gradient equals 5 mm  Hg, consistent with moderate mitral stenosis.  Aortic Root: Normal aortic root.  Aortic Valve: TAVR noted. Peak transaortic valve gradient  equals 27 mm Hg, mean transaorticvalve gradient equals 13  mm Hg, which is probably normal in the presence of a  prosthetic valve. Minimal aortic regurgitation.  Left Atrium: Normal left atrium.  LA volume index = 31  cc/m2.  Left Ventricle: Normal left ventricular systolic function.  No segmental wall motion abnormalities. Normal left  ventricular internal dimensions and wall thicknesses.  Right Heart: Normal right atrium. Normal right ventricular  size and function. Normal tricuspid valve.  Mild tricuspid  regurgitation. Normal pulmonic valve. Minimal pulmonic  regurgitation.  Pericardium/PleuraNormal pericardium with no pericardial  effusion.  Hemodynamic: Estimated right ventricular systolic pressure  equals 41 mm Hg, assuming right atrial pressure equals 10  mm Hg, consistent with mild pulmonary hypertension.  ------------------------------------------------------------------------  CONCLUSIONS:  1. TAVR noted. Peak transaortic valve gradient equals 27 mm  Hg, mean transaortic valve gradient equals 13 mm Hg, which  is probably normal in the presence of a prosthetic valve.  Minimal aortic regurgitation.  2. Normal left ventricular internal dimensions and wall  thicknesses.  3. Normal left ventricular systolic function. No segmental  wall motion abnormalities.  4.Normal right ventricular size and function.  5. Normal tricuspid valve.  Mild tricuspid regurgitation.  6. Estimated pulmonary artery systolic pressure equals 41  mm Hg, assuming right atrial pressure equals 10  mm Hg,  consistent with mild pulmonaryhypertension.  ------------------------------------------------------------------------  Confirmed on  12/20/2017 - 07:16:23 by Roverto Roblero MD,  [facility], [facility], [facility]  ------------------------------------------------------------------------    < end of copied text >    < from: Limited Transthoracic Echo (12.27.17 @ 14:06) >  Patient name: IRASEMA KOHLER  YOB: 1940   Age: 77 (F)   MR#: [id number]  Study Date: 12/27/2017  Location: [id number]: Lauren Finkelstein  Study quality: Limited  Referring Physician: Sammy Galvan MD / Garo Sorto MD / Roverto Roblero MD  Blood Pressure: 139/52 mmHg  Height: 157 cm  Weight: 131 kg  BSA: 2.2 m2  ------------------------------------------------------------------------  PROCEDURE: Limited transthoracic echocardiogram with 2-D.  M-Mode and spectral and color flow Doppler.  INDICATION: Endocarditis, valve unspecified (I38)  ------------------------------------------------------------------------  OBSERVATIONS:  Mitral Valve: Mitral annular calcification and calcified  mitral leaflets with decreased diastolic opening. Peak  mitral valve gradient equals 14 mm Hg, mean transmitral  valve gradient equals 6 mm Hg, consistent with moderate  mitral stenosis.  Aortic Root: Normal aortic root, aortic arch and descending  thoracic aorta.  Aortic Valve:Bioprosthetic aortic valve replacement. s/p  TAVR.  Left Atrium: Normal left atrium.  Left Ventricle: Normal left ventricular systolic function.  No segmental wall motion abnormalities. Normal left  ventricular internal dimensions and wall thicknesses.  Right Heart: Normal right atrium. Normal right ventricular  size and function. Normal tricuspid valve. Normal pulmonic  valve.  Pericardium/PleuraNormal pericardium with no pericardial  effusion.  ------------------------------------------------------------------------  CONCLUSIONS:  1. Mitral annular calcification and calcified mitral  leaflets with decreased diastolic opening. Peak mitral  valve gradient equals 14 mm Hg, mean transmitral valve  gradient equals 6 mm Hg, consistent with moderate mitral  stenosis.  2. Bioprosthetic aortic valve replacement. s/p TAVR.  3. Normal left ventricular systolic function. No segmental  wall motion abnormalities.  *** Compared with echocardiogram of 12/19/2017, no  significant changes noted. There isno obvious evidence of  endocarditis.  ------------------------------------------------------------------------  Confirmed on  12/27/2017 - 17:13:04 by Filipe Esparza M.D.  ------------------------------------------------------------------------    < end of copied text >

## 2017-12-30 NOTE — PROGRESS NOTE ADULT - PROBLEM SELECTOR PLAN 6
-Protonix 40mg before breakfast Mild; appears euvolemic  - Cards recs appreciated  - C/w current regiment

## 2017-12-30 NOTE — PROGRESS NOTE ADULT - PROBLEM SELECTOR PLAN 1
-continue IV ceftriaxone, appreciate ID recs  -continue acetaminophen, percocet for pain PRN -continue IV ceftriaxone, appreciate ID recs  -continue acetaminophen, percocet for pain PRN  -PT following, will try to get patient OOB. 2/2 to strep sanguinous bacteremia   -continue IV ceftriaxone, appreciate ID recs  -continue acetaminophen, percocet for pain PRN  -PT following, will try to get patient OOB.

## 2017-12-30 NOTE — PROGRESS NOTE ADULT - PROBLEM SELECTOR PLAN 7
-Clopidogrel 75mg daily  -Heparin 5000 units SubQ q8h BMI ~54; counselled on importance of massive weight loss for improvement of all comorbid conditions  - F/u with PCP as outpatient following SALAZAR

## 2017-12-30 NOTE — PROGRESS NOTE ADULT - SUBJECTIVE AND OBJECTIVE BOX
Patient is seen and examined.   Doing OK. C/o R knee pain.  No acute events overnight.     Vital Signs Last 24 Hrs  T(C): 37.7 (30 Dec 2017 05:17), Max: 37.7 (29 Dec 2017 09:19)  T(F): 99.8 (30 Dec 2017 05:17), Max: 99.9 (29 Dec 2017 09:19)  HR: 82 (30 Dec 2017 05:17) (75 - 88)  BP: 144/50 (30 Dec 2017 05:17) (144/50 - 169/56)  BP(mean): --  RR: 17 (30 Dec 2017 05:17) (17 - 18)  SpO2: 96% (30 Dec 2017 05:17) (96% - 100%)    Gen: NAD  RLE: [place] on, Aquacel C/D/I   Moving toes distally. Motor intact EHL/FHL/TA/GA. Sensation is grossly intact to light touch. Compartments are soft, extremities are warm. DP 1+ RLE.    A/P: Patient is a 77y Female s/p R stage 1 revision   - Pain control / Analgesia  -Antibiotic Ceftriaxone  - [place] 0-90 degrees at all time  - Anticoagulation ASA / Plavix  -PT/OT  - TTWB at all times  - Sutures out in 3 weeks  -FU with Dr Bass in 4 weeks from discharge. Call office to make an appointment. [phone]  -Inc Spirometry

## 2017-12-31 PROCEDURE — 73562 X-RAY EXAM OF KNEE 3: CPT | Mod: 26,RT

## 2017-12-31 PROCEDURE — 99233 SBSQ HOSP IP/OBS HIGH 50: CPT | Mod: GC

## 2017-12-31 PROCEDURE — 99232 SBSQ HOSP IP/OBS MODERATE 35: CPT

## 2017-12-31 PROCEDURE — 73560 X-RAY EXAM OF KNEE 1 OR 2: CPT | Mod: 26,RT

## 2017-12-31 RX ADMIN — Medication 6 MILLIGRAM(S): at 00:17

## 2017-12-31 RX ADMIN — SIMVASTATIN 20 MILLIGRAM(S): 20 TABLET, FILM COATED ORAL at 21:39

## 2017-12-31 RX ADMIN — HEPARIN SODIUM 5000 UNIT(S): 5000 INJECTION INTRAVENOUS; SUBCUTANEOUS at 06:00

## 2017-12-31 RX ADMIN — OXYCODONE AND ACETAMINOPHEN 1 TABLET(S): 5; 325 TABLET ORAL at 21:51

## 2017-12-31 RX ADMIN — OXYCODONE AND ACETAMINOPHEN 1 TABLET(S): 5; 325 TABLET ORAL at 11:29

## 2017-12-31 RX ADMIN — HEPARIN SODIUM 5000 UNIT(S): 5000 INJECTION INTRAVENOUS; SUBCUTANEOUS at 13:34

## 2017-12-31 RX ADMIN — OXYCODONE AND ACETAMINOPHEN 1 TABLET(S): 5; 325 TABLET ORAL at 12:10

## 2017-12-31 RX ADMIN — BUDESONIDE AND FORMOTEROL FUMARATE DIHYDRATE 2 PUFF(S): 160; 4.5 AEROSOL RESPIRATORY (INHALATION) at 10:31

## 2017-12-31 RX ADMIN — CLOPIDOGREL BISULFATE 75 MILLIGRAM(S): 75 TABLET, FILM COATED ORAL at 11:24

## 2017-12-31 RX ADMIN — HEPARIN SODIUM 5000 UNIT(S): 5000 INJECTION INTRAVENOUS; SUBCUTANEOUS at 21:38

## 2017-12-31 RX ADMIN — PANTOPRAZOLE SODIUM 40 MILLIGRAM(S): 20 TABLET, DELAYED RELEASE ORAL at 06:00

## 2017-12-31 RX ADMIN — Medication 81 MILLIGRAM(S): at 11:24

## 2017-12-31 RX ADMIN — CEFTRIAXONE 100 GRAM(S): 500 INJECTION, POWDER, FOR SOLUTION INTRAMUSCULAR; INTRAVENOUS at 17:31

## 2017-12-31 RX ADMIN — Medication 2.5 MILLIGRAM(S): at 06:00

## 2017-12-31 RX ADMIN — OXYCODONE HYDROCHLORIDE 10 MILLIGRAM(S): 5 TABLET ORAL at 22:03

## 2017-12-31 RX ADMIN — BUDESONIDE AND FORMOTEROL FUMARATE DIHYDRATE 2 PUFF(S): 160; 4.5 AEROSOL RESPIRATORY (INHALATION) at 21:39

## 2017-12-31 NOTE — PROGRESS NOTE ADULT - SUBJECTIVE AND OBJECTIVE BOX
Cardiology/Vascular Medicine Inpatient Progress Note    s/p Stage 1 revision infected knee prosthesis  Clinically stable from cardiovascular perspective  Blood cultures NGTD  Reviewed limited TTE performed last night.  No obvious vegetation.  From our perspective, no current need for ERIKA.  Recommend continuing with long-term antibiotic regimen x 6 weeks.  On DAPT    Vital Signs Last 24 Hrs  T(C): 37.1 (31 Dec 2017 09:01), Max: 37.6 (30 Dec 2017 14:38)  T(F): 98.8 (31 Dec 2017 09:01), Max: 99.6 (30 Dec 2017 14:38)  HR: 65 (31 Dec 2017 09:01) (65 - 79)  BP: 140/53 (31 Dec 2017 09:01) (121/40 - 159/61)  BP(mean): --  RR: 18 (31 Dec 2017 09:01) (16 - 20)  SpO2: 98% (31 Dec 2017 09:01) (95% - 99%)    Appearance: Normal	  HEENT:   Normal oral mucosa, PERRL, EOMI	  Lymphatic: No lymphadenopathy  Cardiovascular: Normal S1 S2, No JVD, No murmurs, No edema  Respiratory: Lungs clear to auscultation	  Psychiatry: A & O x 3, Mood & affect appropriate  Gastrointestinal:  Soft, Non-tender, + BS	  Skin: No rashes, No ecchymoses, No cyanosis	  Neurologic: Non-focal  Extremities: Normal range of motion, No clubbing, cyanosis, +dressings    MEDICATIONS  (STANDING):  aspirin enteric coated 81 milliGRAM(s) Oral daily  buDESOnide 160 MICROgram(s)/formoterol 4.5 MICROgram(s) Inhaler 2 Puff(s) Inhalation two times a day  cefTRIAXone   IVPB      cefTRIAXone   IVPB 2 Gram(s) IV Intermittent every 24 hours  clopidogrel Tablet 75 milliGRAM(s) Oral daily  docusate sodium 100 milliGRAM(s) Oral two times a day  heparin  Injectable 5000 Unit(s) SubCutaneous every 8 hours  pantoprazole    Tablet 40 milliGRAM(s) Oral before breakfast  predniSONE   Tablet 2.5 milliGRAM(s) Oral daily  simvastatin 20 milliGRAM(s) Oral at bedtime    MEDICATIONS  (PRN):  acetaminophen   Tablet. 650 milliGRAM(s) Oral every 6 hours PRN Mild Pain (1 - 3)  melatonin 6 milliGRAM(s) Oral at bedtime PRN Insomnia  nystatin Powder 1 Application(s) Topical two times a day PRN rash  oxyCODONE    5 mG/acetaminophen 325 mG 1 Tablet(s) Oral every 4 hours PRN Moderate Pain (4 - 6)  oxyCODONE    IR 10 milliGRAM(s) Oral two times a day PRN Severe Pain (7 - 10)      LABS:	 	                                         8.3    9.60  )-----------( 279      ( 30 Dec 2017 07:45 )             25.7   12-30    140  |  103  |  18  ----------------------------<  110<H>  4.2   |  26  |  0.89    Ca    7.9<L>      30 Dec 2017 07:45  Phos  4.0     12-30  Mg     1.7     12-30               < from: Transthoracic Echocardiogram (12.19.17 @ 07:36) >  Patient name: IRASEMA KOHLER  YOB: 1940   Age: 77 (F)   MR#: [id number]  Study Date: 12/19/2017  Location: O/PSonographer: Lauren Finkelstein  Study quality: Technically good  Referring Physician: Roverto Roblero MD  Blood Pressure: 152/63 mmHg  Height: 160 cm  Weight: 120 kg  BSA: 2.2 m2  ------------------------------------------------------------------------  PT/INR - ( 24 Dec 2017 14:47 )   PT: 12.4 SEC;   INR: 1.08          PTT - ( 24 Dec 2017 14:47 )  PTT:31.8 SECPROCEDURE: Transthoracic echocardiogram with 2-D, M-Mode  and complete spectral and color flow Doppler.  INDICATION: Atherosclerotic heart disease of native  coronary artery without angina pectoris (I25.10),  Unspecified diastolic (congestive) heart failure (I50.30)  ------------------------------------------------------------------------  DIMENSIONS:  Dimensions:     Normal Values:  LA:     3.5 cm    2.0 - 4.0 cm  Ao:     2.2 cm    2.0 - 3.8 cm  SEPTUM: 0.9 cm    0.6 - 1.2 cm  PWT:    0.8 cm    0.6 - 1.1 cm  LVIDd:  4.6 cm    3.0 - 5.6 cm  LVIDs:  2.6 cm    1.8 - 4.0 cm  Derived Variables:  LVMI: 59 g/m2  RWT: 0.34  Fractional short: 43 %  Ejection Fraction (Teicholtz): 75 %  ------------------------------------------------------------------------  OBSERVATIONS:  Mitral Valve: Mitral annular calcification and calcified  mitral leaflets with normal diastolic opening. Mild mitral  regurgitation.  Mean transmitral valve gradient equals 5 mm  Hg, consistent with moderate mitral stenosis.  Aortic Root: Normal aortic root.  Aortic Valve: TAVR noted. Peak transaortic valve gradient  equals 27 mm Hg, mean transaorticvalve gradient equals 13  mm Hg, which is probably normal in the presence of a  prosthetic valve. Minimal aortic regurgitation.  Left Atrium: Normal left atrium.  LA volume index = 31  cc/m2.  Left Ventricle: Normal left ventricular systolic function.  No segmental wall motion abnormalities. Normal left  ventricular internal dimensions and wall thicknesses.  Right Heart: Normal right atrium. Normal right ventricular  size and function. Normal tricuspid valve.  Mild tricuspid  regurgitation. Normal pulmonic valve. Minimal pulmonic  regurgitation.  Pericardium/PleuraNormal pericardium with no pericardial  effusion.  Hemodynamic: Estimated right ventricular systolic pressure  equals 41 mm Hg, assuming right atrial pressure equals 10  mm Hg, consistent with mild pulmonary hypertension.  ------------------------------------------------------------------------  CONCLUSIONS:  1. TAVR noted. Peak transaortic valve gradient equals 27 mm  Hg, mean transaortic valve gradient equals 13 mm Hg, which  is probably normal in the presence of a prosthetic valve.  Minimal aortic regurgitation.  2. Normal left ventricular internal dimensions and wall  thicknesses.  3. Normal left ventricular systolic function. No segmental  wall motion abnormalities.  4.Normal right ventricular size and function.  5. Normal tricuspid valve.  Mild tricuspid regurgitation.  6. Estimated pulmonary artery systolic pressure equals 41  mm Hg, assuming right atrial pressure equals 10  mm Hg,  consistent with mild pulmonaryhypertension.  ------------------------------------------------------------------------  Confirmed on  12/20/2017 - 07:16:23 by Roverto Roblero MD,  [facility], [facility], [facility]  ------------------------------------------------------------------------    < end of copied text >    < from: Limited Transthoracic Echo (12.27.17 @ 14:06) >  Patient name: IRASEMA KOHLER  YOB: 1940   Age: 77 (F)   MR#: [id number]  Study Date: 12/27/2017  Location: [id number]: Lauren Finkelstein  Study quality: Limited  Referring Physician: Sammy Galvan MD / Garo Sorto MD / Roverto Roblero MD  Blood Pressure: 139/52 mmHg  Height: 157 cm  Weight: 131 kg  BSA: 2.2 m2  ------------------------------------------------------------------------  PROCEDURE: Limited transthoracic echocardiogram with 2-D.  M-Mode and spectral and color flow Doppler.  INDICATION: Endocarditis, valve unspecified (I38)  ------------------------------------------------------------------------  OBSERVATIONS:  Mitral Valve: Mitral annular calcification and calcified  mitral leaflets with decreased diastolic opening. Peak  mitral valve gradient equals 14 mm Hg, mean transmitral  valve gradient equals 6 mm Hg, consistent with moderate  mitral stenosis.  Aortic Root: Normal aortic root, aortic arch and descending  thoracic aorta.  Aortic Valve:Bioprosthetic aortic valve replacement. s/p  TAVR.  Left Atrium: Normal left atrium.  Left Ventricle: Normal left ventricular systolic function.  No segmental wall motion abnormalities. Normal left  ventricular internal dimensions and wall thicknesses.  Right Heart: Normal right atrium. Normal right ventricular  size and function. Normal tricuspid valve. Normal pulmonic  valve.  Pericardium/PleuraNormal pericardium with no pericardial  effusion.  ------------------------------------------------------------------------  CONCLUSIONS:  1. Mitral annular calcification and calcified mitral  leaflets with decreased diastolic opening. Peak mitral  valve gradient equals 14 mm Hg, mean transmitral valve  gradient equals 6 mm Hg, consistent with moderate mitral  stenosis.  2. Bioprosthetic aortic valve replacement. s/p TAVR.  3. Normal left ventricular systolic function. No segmental  wall motion abnormalities.  *** Compared with echocardiogram of 12/19/2017, no  significant changes noted. There isno obvious evidence of  endocarditis.  ------------------------------------------------------------------------  Confirmed on  12/27/2017 - 17:13:04 by Filipe Esparza M.D.  ------------------------------------------------------------------------    < end of copied text >

## 2017-12-31 NOTE — PROGRESS NOTE ADULT - ASSESSMENT
77y female with remote b/l TKR, TAVR and PPM 1 yr ago, CAD- stent, Coumadin, root canal ~ 1 month ago, admitted with 1 wk of fatigue, and septic R knee (53k WBCs)  s/p I&D, hardware removal, placement of spacer.    Bld Cxs Strep sanguinis in all 4 bottles, most likely odontogenic focus  Synovial fluid with strep sanguinus as well  Underlying TAVR and PPM, hence at risk for valvular or lead endocarditis  TTE noted, no change c/w older study  Low grade temp elevations noted,patient subjectively not aware,they are less than 100  Plan:  Ceftriaxone 2 g IV q 24- 6 wks via PICC day 7/42; possibly followed by extended oral regimen  She is tolerating antibiotics well, no drug side effects  Weekly cbc,diff,cmp,esr,and crp upon discharge  follow low grade temps conservatively, ? inflammatory secondary to knee infection  No signs of drug toxicity  Disposition per primary team, favor supervised care

## 2017-12-31 NOTE — PROGRESS NOTE ADULT - SUBJECTIVE AND OBJECTIVE BOX
CC: f/u for strep bacteremia and PJI    Patient reports no knew issues, still has difficulty getting out of bed.    REVIEW OF SYSTEMS:  All other review of systems negative (Comprehensive ROS)    Antimicrobials Day #  :8  cefTRIAXone   IVPB      cefTRIAXone   IVPB 2 Gram(s) IV Intermittent every 24 hours    Other Medications Reviewed    T(F): 98.8 (12-31-17 @ 09:01), Max: 99.6 (12-30-17 @ 14:38)  HR: 65 (12-31-17 @ 09:01)  BP: 140/53 (12-31-17 @ 09:01)  RR: 18 (12-31-17 @ 09:01)  SpO2: 98% (12-31-17 @ 09:01)  Wt(kg): --    PHYSICAL EXAM:  General: alert, no acute distress  Eyes:  anicteric, no conjunctival injection, no discharge  Oropharynx: no lesions or injection 	  Neck: supple, without adenopathy  Lungs: clear to auscultation  Heart: regular rate and rhythm; no murmur, rubs or gallops  Abdomen: soft, nondistended, nontender, without mass or organomegaly  Skin: no lesions  Extremities: no clubbing, cyanosis, or edema  Neurologic: alert, oriented, moves all extremities  RT knee dressing is dry  LAB RESULTS:                        8.3    9.60  )-----------( 279      ( 30 Dec 2017 07:45 )             25.7     12-30    140  |  103  |  18  ----------------------------<  110<H>  4.2   |  26  |  0.89    Ca    7.9<L>      30 Dec 2017 07:45  Phos  4.0     12-30  Mg     1.7     12-30          MICROBIOLOGY:  RECENT CULTURES:      RADIOLOGY REVIEWED:

## 2017-12-31 NOTE — PROGRESS NOTE ADULT - ASSESSMENT
History of AS s/p TAVR  Normal LV function  Mild pulmonary HTN  Probable diastolic dysfunction  Morbid obesity, HTN, hyperlipidemia  Stable from the cardiovascular perspective.    S/P Stage 1 revision infected knee prosthesis  Receiving IV abx per ID  Reviewed limited TTE performed last night -- no obvious vegetation  No current need for ERIKA  Recommend continuing with long-term IV abx x 6 weeks.  She can f/u with for outpatient surveillance TTE.  On aspirin 81 mg daily  On Plavix.  D/C planning to rehab per primary team

## 2017-12-31 NOTE — PROGRESS NOTE ADULT - PROBLEM SELECTOR PLAN 7
BMI ~54; counselled on importance of massive weight loss for improvement of all comorbid conditions  - F/u with PCP as outpatient following SALAZAR

## 2017-12-31 NOTE — PROGRESS NOTE ADULT - SUBJECTIVE AND OBJECTIVE BOX
CC: F/u septic arthritis     INTERVAL/OVERNIGHT EVENTS:     No overnight events, pt reports good pain control. Denies any fevers, chills, or SOB. She feels slightly more encouraged this morning after she was able to stand briefly yesterday w/assistance.     MEDICATIONS  (STANDING):  aspirin enteric coated 81 milliGRAM(s) Oral daily  buDESOnide 160 MICROgram(s)/formoterol 4.5 MICROgram(s) Inhaler 2 Puff(s) Inhalation two times a day  cefTRIAXone   IVPB      cefTRIAXone   IVPB 2 Gram(s) IV Intermittent every 24 hours  clopidogrel Tablet 75 milliGRAM(s) Oral daily  docusate sodium 100 milliGRAM(s) Oral two times a day  heparin  Injectable 5000 Unit(s) SubCutaneous every 8 hours  pantoprazole    Tablet 40 milliGRAM(s) Oral before breakfast  predniSONE   Tablet 2.5 milliGRAM(s) Oral daily  simvastatin 20 milliGRAM(s) Oral at bedtime    MEDICATIONS  (PRN):  acetaminophen   Tablet. 650 milliGRAM(s) Oral every 6 hours PRN Mild Pain (1 - 3)  melatonin 6 milliGRAM(s) Oral at bedtime PRN Insomnia  nystatin Powder 1 Application(s) Topical two times a day PRN rash  oxyCODONE    5 mG/acetaminophen 325 mG 1 Tablet(s) Oral every 4 hours PRN Moderate Pain (4 - 6)  oxyCODONE    IR 10 milliGRAM(s) Oral two times a day PRN Severe Pain (7 - 10)    Allergies    No Known Allergies    Intolerances    IV Contrast (Flushing (Skin); Nausea)    All other systems reviewed and negative [x]     VITAL SIGNS AND PHYSICAL EXAM:  Vital Signs Last 24 Hrs  T(C): 37.1 (31 Dec 2017 09:01), Max: 37.6 (30 Dec 2017 14:38)  T(F): 98.8 (31 Dec 2017 09:01), Max: 99.6 (30 Dec 2017 14:38)  HR: 65 (31 Dec 2017 09:01) (65 - 79)  BP: 140/53 (31 Dec 2017 09:01) (121/40 - 159/61)  BP(mean): --  RR: 18 (31 Dec 2017 09:01) (16 - 20)  SpO2: 98% (31 Dec 2017 09:01) (95% - 99%)  I&O's Summary    30 Dec 2017 07:01  -  31 Dec 2017 07:00  --------------------------------------------------------  IN: 0 mL / OUT: 400 mL / NET: -400 mL      Gen: No acute distress  HEENT: Normocephalic, atraumatic, extraocular movements intact, conjunctiva clear without ictuers  CV: Regular rate and rhythm, no murmurs, rubs, or gallops  Resp: Non-labored, clear to auscultation bilaterally  Abd: soft, non-tender, non-distended  Skin: no rash  Neuro: grossly normal   Ext: brace on RLE. peripheral pulses 1+ b/l      INTERVAL LAB RESULTS:                        8.3    9.60  )-----------( 279      ( 30 Dec 2017 07:45 )             25.7                         8.9    9.05  )-----------( 280      ( 29 Dec 2017 07:30 )             27.6             INTERVAL IMAGING STUDIES:

## 2017-12-31 NOTE — PROGRESS NOTE ADULT - ASSESSMENT
77F with morbid obesity, AS s/p TAVR, bilateral knee OA s/p TKA (2002), polymyalgia on chronic prednisone, admitted for septic arthritis w/strep sanguinous bacteremia, now s/p explanation w/spacer placement. Will continue ceftriaxone IV per ID recs, day 8/42 today. PT will evaluate/work with patient today. Will discuss need for line care and rehabilitation with case management.

## 2017-12-31 NOTE — PROGRESS NOTE ADULT - SUBJECTIVE AND OBJECTIVE BOX
Patient is seen and examined.   Difficulty with PT getting out of bed due to weight and general deconditioning. Mild knee pain.  No acute events overnight.     Gen: NAD  RLE: Andie on, Aquacel C/D/I   Moving toes distally. Motor intact EHL/FHL/TA/GA. Sensation is grossly intact to light touch. Compartments are soft, extremities are warm. DP 1+ RLE. I am able to passively range her knee painlessly 0-70    A/P: Patient is a 77y Female s/p R stage 1 revision   - Pain control / Analgesia  -Antibiotic Ceftriaxone per ID  - [place] 0-90 degrees at all time, may consider locking in extension for PT to facilitate getting oob  - Anticoagulation ASA / Plavix  -PT/OT  - TTWB at all times  - Sutures out in 3 weeks  -FU with Dr Bass in 4 weeks from discharge. Call office to make an appointment. [phone]  -Inc Spirometry

## 2017-12-31 NOTE — PROGRESS NOTE ADULT - ATTENDING COMMENTS
Patient seen and examined. No new complaints. Clinically stable. She needs to continue to work with PT prior to DC to [facility]. Will discuss with SW following holiday. C/w IV antibiotics. Appreciate ortho recs.    Further details per resident note above

## 2017-12-31 NOTE — PROGRESS NOTE ADULT - PROBLEM SELECTOR PLAN 1
2/2 to strep sanguinous bacteremia   -continue IV ceftriaxone, appreciate ID recs  -continue acetaminophen, percocet for pain PRN  -PT following

## 2018-01-01 PROCEDURE — 73562 X-RAY EXAM OF KNEE 3: CPT | Mod: 26,RT

## 2018-01-01 PROCEDURE — 99232 SBSQ HOSP IP/OBS MODERATE 35: CPT

## 2018-01-01 PROCEDURE — 73560 X-RAY EXAM OF KNEE 1 OR 2: CPT | Mod: 26,RT

## 2018-01-01 PROCEDURE — 99233 SBSQ HOSP IP/OBS HIGH 50: CPT | Mod: GC

## 2018-01-01 RX ADMIN — CEFTRIAXONE 100 GRAM(S): 500 INJECTION, POWDER, FOR SOLUTION INTRAMUSCULAR; INTRAVENOUS at 19:25

## 2018-01-01 RX ADMIN — HEPARIN SODIUM 5000 UNIT(S): 5000 INJECTION INTRAVENOUS; SUBCUTANEOUS at 15:06

## 2018-01-01 RX ADMIN — BUDESONIDE AND FORMOTEROL FUMARATE DIHYDRATE 2 PUFF(S): 160; 4.5 AEROSOL RESPIRATORY (INHALATION) at 21:53

## 2018-01-01 RX ADMIN — Medication 81 MILLIGRAM(S): at 15:06

## 2018-01-01 RX ADMIN — BUDESONIDE AND FORMOTEROL FUMARATE DIHYDRATE 2 PUFF(S): 160; 4.5 AEROSOL RESPIRATORY (INHALATION) at 10:28

## 2018-01-01 RX ADMIN — Medication 2.5 MILLIGRAM(S): at 06:27

## 2018-01-01 RX ADMIN — HEPARIN SODIUM 5000 UNIT(S): 5000 INJECTION INTRAVENOUS; SUBCUTANEOUS at 06:26

## 2018-01-01 RX ADMIN — PANTOPRAZOLE SODIUM 40 MILLIGRAM(S): 20 TABLET, DELAYED RELEASE ORAL at 06:27

## 2018-01-01 RX ADMIN — OXYCODONE HYDROCHLORIDE 10 MILLIGRAM(S): 5 TABLET ORAL at 21:53

## 2018-01-01 RX ADMIN — CLOPIDOGREL BISULFATE 75 MILLIGRAM(S): 75 TABLET, FILM COATED ORAL at 15:06

## 2018-01-01 RX ADMIN — OXYCODONE HYDROCHLORIDE 10 MILLIGRAM(S): 5 TABLET ORAL at 11:15

## 2018-01-01 RX ADMIN — OXYCODONE HYDROCHLORIDE 10 MILLIGRAM(S): 5 TABLET ORAL at 10:28

## 2018-01-01 RX ADMIN — Medication 6 MILLIGRAM(S): at 02:30

## 2018-01-01 RX ADMIN — OXYCODONE HYDROCHLORIDE 10 MILLIGRAM(S): 5 TABLET ORAL at 22:20

## 2018-01-01 RX ADMIN — HEPARIN SODIUM 5000 UNIT(S): 5000 INJECTION INTRAVENOUS; SUBCUTANEOUS at 22:10

## 2018-01-01 NOTE — PROGRESS NOTE ADULT - SUBJECTIVE AND OBJECTIVE BOX
Cardiology/Vascular Medicine Inpatient Progress Note    s/p Stage 1 revision infected knee prosthesis  Clinically stable from cardiovascular perspective  Blood cultures NGTD  Reviewed limited TTE performed last night.  No obvious vegetation.  From our perspective, no current need for ERIKA.  Recommend continuing with long-term antibiotic regimen x 6 weeks.  On DAPT    Vital Signs Last 24 Hrs  T(C): 36.9 (01 Jan 2018 10:36), Max: 37.2 (01 Jan 2018 06:00)  T(F): 98.4 (01 Jan 2018 10:36), Max: 99 (01 Jan 2018 06:00)  HR: 62 (01 Jan 2018 10:36) (62 - 72)  BP: 143/48 (01 Jan 2018 06:00) (124/37 - 161/63)  BP(mean): --  RR: 18 (01 Jan 2018 10:36) (17 - 18)  SpO2: 97% (01 Jan 2018 10:36) (97% - 99%)    Appearance: Normal	  HEENT:   Normal oral mucosa, PERRL, EOMI	  Lymphatic: No lymphadenopathy  Cardiovascular: Normal S1 S2, No JVD, No murmurs, No edema  Respiratory: Lungs clear to auscultation	  Psychiatry: A & O x 3, Mood & affect appropriate  Gastrointestinal:  Soft, Non-tender, + BS	  Skin: No rashes, No ecchymoses, No cyanosis	  Neurologic: Non-focal  Extremities: Normal range of motion, No clubbing, cyanosis, +dressings    MEDICATIONS  (STANDING):  aspirin enteric coated 81 milliGRAM(s) Oral daily  buDESOnide 160 MICROgram(s)/formoterol 4.5 MICROgram(s) Inhaler 2 Puff(s) Inhalation two times a day  cefTRIAXone   IVPB      cefTRIAXone   IVPB 2 Gram(s) IV Intermittent every 24 hours  clopidogrel Tablet 75 milliGRAM(s) Oral daily  docusate sodium 100 milliGRAM(s) Oral two times a day  heparin  Injectable 5000 Unit(s) SubCutaneous every 8 hours  pantoprazole    Tablet 40 milliGRAM(s) Oral before breakfast  predniSONE   Tablet 2.5 milliGRAM(s) Oral daily  simvastatin 20 milliGRAM(s) Oral at bedtime    MEDICATIONS  (PRN):  acetaminophen   Tablet. 650 milliGRAM(s) Oral every 6 hours PRN Mild Pain (1 - 3)  melatonin 6 milliGRAM(s) Oral at bedtime PRN Insomnia  nystatin Powder 1 Application(s) Topical two times a day PRN rash  oxyCODONE    5 mG/acetaminophen 325 mG 1 Tablet(s) Oral every 4 hours PRN Moderate Pain (4 - 6)  oxyCODONE    IR 10 milliGRAM(s) Oral two times a day PRN Severe Pain (7 - 10)      LABS:	 	                    < from: Transthoracic Echocardiogram (12.19.17 @ 07:36) >  Patient name: IRASEMA KOHLER  YOB: 1940   Age: 77 (F)   MR#: [id number]  Study Date: 12/19/2017  Location: O/PSonographer: Lauren Finkelstein  Study quality: Technically good  Referring Physician: Roverto Roblero MD  Blood Pressure: 152/63 mmHg  Height: 160 cm  Weight: 120 kg  BSA: 2.2 m2  ------------------------------------------------------------------------  PT/INR - ( 24 Dec 2017 14:47 )   PT: 12.4 SEC;   INR: 1.08          PTT - ( 24 Dec 2017 14:47 )  PTT:31.8 SECPROCEDURE: Transthoracic echocardiogram with 2-D, M-Mode  and complete spectral and color flow Doppler.  INDICATION: Atherosclerotic heart disease of native  coronary artery without angina pectoris (I25.10),  Unspecified diastolic (congestive) heart failure (I50.30)  ------------------------------------------------------------------------  DIMENSIONS:  Dimensions:     Normal Values:  LA:     3.5 cm    2.0 - 4.0 cm  Ao:     2.2 cm    2.0 - 3.8 cm  SEPTUM: 0.9 cm    0.6 - 1.2 cm  PWT:    0.8 cm    0.6 - 1.1 cm  LVIDd:  4.6 cm    3.0 - 5.6 cm  LVIDs:  2.6 cm    1.8 - 4.0 cm  Derived Variables:  LVMI: 59 g/m2  RWT: 0.34  Fractional short: 43 %  Ejection Fraction (Teicholtz): 75 %  ------------------------------------------------------------------------  OBSERVATIONS:  Mitral Valve: Mitral annular calcification and calcified  mitral leaflets with normal diastolic opening. Mild mitral  regurgitation.  Mean transmitral valve gradient equals 5 mm  Hg, consistent with moderate mitral stenosis.  Aortic Root: Normal aortic root.  Aortic Valve: TAVR noted. Peak transaortic valve gradient  equals 27 mm Hg, mean transaorticvalve gradient equals 13  mm Hg, which is probably normal in the presence of a  prosthetic valve. Minimal aortic regurgitation.  Left Atrium: Normal left atrium.  LA volume index = 31  cc/m2.  Left Ventricle: Normal left ventricular systolic function.  No segmental wall motion abnormalities. Normal left  ventricular internal dimensions and wall thicknesses.  Right Heart: Normal right atrium. Normal right ventricular  size and function. Normal tricuspid valve.  Mild tricuspid  regurgitation. Normal pulmonic valve. Minimal pulmonic  regurgitation.  Pericardium/PleuraNormal pericardium with no pericardial  effusion.  Hemodynamic: Estimated right ventricular systolic pressure  equals 41 mm Hg, assuming right atrial pressure equals 10  mm Hg, consistent with mild pulmonary hypertension.  ------------------------------------------------------------------------  CONCLUSIONS:  1. TAVR noted. Peak transaortic valve gradient equals 27 mm  Hg, mean transaortic valve gradient equals 13 mm Hg, which  is probably normal in the presence of a prosthetic valve.  Minimal aortic regurgitation.  2. Normal left ventricular internal dimensions and wall  thicknesses.  3. Normal left ventricular systolic function. No segmental  wall motion abnormalities.  4.Normal right ventricular size and function.  5. Normal tricuspid valve.  Mild tricuspid regurgitation.  6. Estimated pulmonary artery systolic pressure equals 41  mm Hg, assuming right atrial pressure equals 10  mm Hg,  consistent with mild pulmonaryhypertension.  ------------------------------------------------------------------------  Confirmed on  12/20/2017 - 07:16:23 by Roverto Roblero MD,  [facility], [facility], [facility]  ------------------------------------------------------------------------    < end of copied text >    < from: Limited Transthoracic Echo (12.27.17 @ 14:06) >  Patient name: IRASEMA KOHLER  YOB: 1940   Age: 77 (F)   MR#: [id number]  Study Date: 12/27/2017  Location: [id number]: Lauren Finkelstein  Study quality: Limited  Referring Physician: Sammy Galvan MD / Garo Sorto MD / Roverto Roblero MD  Blood Pressure: 139/52 mmHg  Height: 157 cm  Weight: 131 kg  BSA: 2.2 m2  ------------------------------------------------------------------------  PROCEDURE: Limited transthoracic echocardiogram with 2-D.  M-Mode and spectral and color flow Doppler.  INDICATION: Endocarditis, valve unspecified (I38)  ------------------------------------------------------------------------  OBSERVATIONS:  Mitral Valve: Mitral annular calcification and calcified  mitral leaflets with decreased diastolic opening. Peak  mitral valve gradient equals 14 mm Hg, mean transmitral  valve gradient equals 6 mm Hg, consistent with moderate  mitral stenosis.  Aortic Root: Normal aortic root, aortic arch and descending  thoracic aorta.  Aortic Valve:Bioprosthetic aortic valve replacement. s/p  TAVR.  Left Atrium: Normal left atrium.  Left Ventricle: Normal left ventricular systolic function.  No segmental wall motion abnormalities. Normal left  ventricular internal dimensions and wall thicknesses.  Right Heart: Normal right atrium. Normal right ventricular  size and function. Normal tricuspid valve. Normal pulmonic  valve.  Pericardium/PleuraNormal pericardium with no pericardial  effusion.  ------------------------------------------------------------------------  CONCLUSIONS:  1. Mitral annular calcification and calcified mitral  leaflets with decreased diastolic opening. Peak mitral  valve gradient equals 14 mm Hg, mean transmitral valve  gradient equals 6 mm Hg, consistent with moderate mitral  stenosis.  2. Bioprosthetic aortic valve replacement. s/p TAVR.  3. Normal left ventricular systolic function. No segmental  wall motion abnormalities.  *** Compared with echocardiogram of 12/19/2017, no  significant changes noted. There isno obvious evidence of  endocarditis.  ------------------------------------------------------------------------  Confirmed on  12/27/2017 - 17:13:04 by Filipe Esparza M.D.  ------------------------------------------------------------------------    < end of copied text >

## 2018-01-01 NOTE — PROGRESS NOTE ADULT - ATTENDING COMMENTS
I agree with the above note and have personally seen and examined this patient. All pertinent films have been reviewed. Please refer to clinical documentation of the history, physical examinations, data summary, and both assessment and plan as documented above and with which I agree.    knee xray will be repeated, xray taken this morning is oblique and malrotated precluding interpretation  otherwise pending snf dispo    Nate Bass MD  Attending Orthopedic Surgeon

## 2018-01-01 NOTE — PROGRESS NOTE ADULT - ASSESSMENT
77F with morbid obesity, AS s/p TAVR, bilateral knee OA s/p TKA (2002), polymyalgia on chronic prednisone, admitted for septic arthritis w/strep sanguinous bacteremia, now s/p explanation w/spacer placement. Will continue ceftriaxone IV per ID recs, day 9/42 today. Will discuss need for line care and rehabilitation with case management.

## 2018-01-01 NOTE — PROGRESS NOTE ADULT - SUBJECTIVE AND OBJECTIVE BOX
CC: F/u septic arthritis     INTERVAL/OVERNIGHT EVENTS:     No overnight events, pt reports good pain control. Denies any fevers, chills, or SOB. Pt again was able to stand briefly yesterday w/assistance.      INTERVAL/OVERNIGHT EVENTS:     MEDICATIONS  (STANDING):  aspirin enteric coated 81 milliGRAM(s) Oral daily  buDESOnide 160 MICROgram(s)/formoterol 4.5 MICROgram(s) Inhaler 2 Puff(s) Inhalation two times a day  cefTRIAXone   IVPB      cefTRIAXone   IVPB 2 Gram(s) IV Intermittent every 24 hours  clopidogrel Tablet 75 milliGRAM(s) Oral daily  docusate sodium 100 milliGRAM(s) Oral two times a day  heparin  Injectable 5000 Unit(s) SubCutaneous every 8 hours  pantoprazole    Tablet 40 milliGRAM(s) Oral before breakfast  predniSONE   Tablet 2.5 milliGRAM(s) Oral daily  simvastatin 20 milliGRAM(s) Oral at bedtime    MEDICATIONS  (PRN):  acetaminophen   Tablet. 650 milliGRAM(s) Oral every 6 hours PRN Mild Pain (1 - 3)  melatonin 6 milliGRAM(s) Oral at bedtime PRN Insomnia  nystatin Powder 1 Application(s) Topical two times a day PRN rash  oxyCODONE    5 mG/acetaminophen 325 mG 1 Tablet(s) Oral every 4 hours PRN Moderate Pain (4 - 6)  oxyCODONE    IR 10 milliGRAM(s) Oral two times a day PRN Severe Pain (7 - 10)    Allergies    No Known Allergies    Intolerances    IV Contrast (Flushing (Skin); Nausea)        All other systems reviewed and negative [ ]     VITAL SIGNS AND PHYSICAL EXAM:  Vital Signs Last 24 Hrs  T(C): 37.2 (01 Jan 2018 06:00), Max: 37.2 (01 Jan 2018 06:00)  T(F): 99 (01 Jan 2018 06:00), Max: 99 (01 Jan 2018 06:00)  HR: 72 (01 Jan 2018 06:00) (65 - 72)  BP: 143/48 (01 Jan 2018 06:00) (124/37 - 161/63)  BP(mean): --  RR: 18 (01 Jan 2018 06:00) (17 - 18)  SpO2: 98% (01 Jan 2018 06:00) (97% - 99%)  I&O's Summary      Gen: No acute distress  HEENT: Normocephalic, atraumatic, extraocular movements intact, conjunctiva clear without ictuers  CV: Regular rate and rhythm, no murmurs, rubs, or gallops  Resp: Non-labored, clear to auscultation bilaterally  Abd: soft, non-tender, non-distended  Skin: no rash  Neuro: grossly normal   Ext: brace on RLE. peripheral pulses 1+ b/l      INTERVAL LAB RESULTS:                        8.3    9.60  )-----------( 279      ( 30 Dec 2017 07:45 )             25.7             INTERVAL IMAGING STUDIES:      INTERVAL IMAGING STUDIES:

## 2018-01-01 NOTE — PROGRESS NOTE ADULT - SUBJECTIVE AND OBJECTIVE BOX
ORTHO PROGRESS NOTE     Pt seen and examined at bedside.  No significant overnight events. Pain well controlled.    Vital Signs Last 24 Hrs  T(C): 37.2 (01 Jan 2018 06:00), Max: 37.2 (01 Jan 2018 06:00)  T(F): 99 (01 Jan 2018 06:00), Max: 99 (01 Jan 2018 06:00)  HR: 72 (01 Jan 2018 06:00) (65 - 72)  BP: 143/48 (01 Jan 2018 06:00) (124/37 - 161/63)  BP(mean): --  RR: 18 (01 Jan 2018 06:00) (17 - 18)  SpO2: 98% (01 Jan 2018 06:00) (97% - 99%)    Gen: No apparent distress, alert    LE:  Dressing C/D/I;           +DF/PF. moving toes          SILT, wwp at foot          compartments soft    Labs:  CBC Full  -  ( 30 Dec 2017 07:45 )  WBC Count : 9.60 K/uL  Hemoglobin : 8.3 g/dL  Hematocrit : 25.7 %  Platelet Count - Automated : 279 K/uL  Mean Cell Volume : 87.4 fL  Mean Cell Hemoglobin : 28.2 pg  Mean Cell Hemoglobin Concentration : 32.3 %  Auto Neutrophil # : x  Auto Lymphocyte # : x  Auto Monocyte # : x  Auto Eosinophil # : x  Auto Basophil # : x  Auto Neutrophil % : x  Auto Lymphocyte % : x  Auto Monocyte % : x  Auto Eosinophil % : x  Auto Basophil % : x      12-30    140  |  103  |  18  ----------------------------<  110<H>  4.2   |  26  |  0.89    Ca    7.9<L>      30 Dec 2017 07:45  Phos  4.0     12-30  Mg     1.7     12-30        A/P  Pt is a 77y old Female s/p R TKA stage 1 revision, POD #9    - Pain control/ Analgesia  - DVT ppx  -PT/OT - wbat/oob    -WBAT  -Rehab planning

## 2018-01-02 ENCOUNTER — APPOINTMENT (OUTPATIENT)
Dept: ORTHOPEDIC SURGERY | Facility: CLINIC | Age: 78
End: 2018-01-02

## 2018-01-02 VITALS
OXYGEN SATURATION: 100 % | SYSTOLIC BLOOD PRESSURE: 144 MMHG | HEART RATE: 80 BPM | DIASTOLIC BLOOD PRESSURE: 51 MMHG | TEMPERATURE: 99 F | RESPIRATION RATE: 18 BRPM

## 2018-01-02 PROCEDURE — 99232 SBSQ HOSP IP/OBS MODERATE 35: CPT

## 2018-01-02 PROCEDURE — 99239 HOSP IP/OBS DSCHRG MGMT >30: CPT

## 2018-01-02 RX ORDER — CEFTRIAXONE 500 MG/1
2 INJECTION, POWDER, FOR SOLUTION INTRAMUSCULAR; INTRAVENOUS
Qty: 0 | Refills: 0 | COMMUNITY
Start: 2018-01-02

## 2018-01-02 RX ADMIN — OXYCODONE HYDROCHLORIDE 10 MILLIGRAM(S): 5 TABLET ORAL at 09:43

## 2018-01-02 RX ADMIN — HEPARIN SODIUM 5000 UNIT(S): 5000 INJECTION INTRAVENOUS; SUBCUTANEOUS at 05:39

## 2018-01-02 RX ADMIN — Medication 6 MILLIGRAM(S): at 00:55

## 2018-01-02 RX ADMIN — BUDESONIDE AND FORMOTEROL FUMARATE DIHYDRATE 2 PUFF(S): 160; 4.5 AEROSOL RESPIRATORY (INHALATION) at 09:43

## 2018-01-02 RX ADMIN — PANTOPRAZOLE SODIUM 40 MILLIGRAM(S): 20 TABLET, DELAYED RELEASE ORAL at 05:39

## 2018-01-02 RX ADMIN — OXYCODONE HYDROCHLORIDE 10 MILLIGRAM(S): 5 TABLET ORAL at 10:31

## 2018-01-02 RX ADMIN — Medication 2.5 MILLIGRAM(S): at 05:38

## 2018-01-02 RX ADMIN — CLOPIDOGREL BISULFATE 75 MILLIGRAM(S): 75 TABLET, FILM COATED ORAL at 09:43

## 2018-01-02 RX ADMIN — Medication 81 MILLIGRAM(S): at 09:43

## 2018-01-02 RX ADMIN — HEPARIN SODIUM 5000 UNIT(S): 5000 INJECTION INTRAVENOUS; SUBCUTANEOUS at 14:18

## 2018-01-02 NOTE — PROGRESS NOTE ADULT - PROBLEM SELECTOR PLAN 3
-holding clopidogrel  -will start aspirin following procedure
-Aspirin 81 mg daily  -Simvastatin 20mg
-Aspirin 81 mg daily  -Simvastatin 20mg
-Aspirin 325mg BID  -Simvastatin 20mg
-Aspirin 81 mg daily  -Simvastatin 20mg
-Aspirin 81 mg daily  -Simvastatin 20mg
-Aspirin decreased to 81 mg daily, as per cards  -Simvastatin 20mg
-continue clopidogrel 75mg daily

## 2018-01-02 NOTE — PROGRESS NOTE ADULT - PROBLEM SELECTOR PROBLEM 3
Coronary artery disease

## 2018-01-02 NOTE — PROGRESS NOTE ADULT - ASSESSMENT
77F with morbid obesity, AS s/p TAVR, bilateral knee OA s/p TKA (2002), polymyalgia on chronic prednisone, admitted for septic arthritis w/strep sanguinous bacteremia, now s/p explanation w/spacer placement. Will continue ceftriaxone IV per ID recs, day 10/42 today. Rehabilitation referral to be made today as pt has slowly progressed to getting OOB w/assistance. 77F with morbid obesity, AS s/p TAVR, bilateral knee OA s/p TKA (2002), polymyalgia on chronic prednisone, admitted for septic arthritis w/strep sanguinous bacteremia, now s/p explanation w/spacer placement. s/p PICC placement on 12/28. Will continue ceftriaxone IV per ID recs, day 10/42 today. Rehabilitation referral to be made today as pt has slowly progressed to getting OOB w/assistance.

## 2018-01-02 NOTE — PROGRESS NOTE ADULT - PROBLEM SELECTOR PLAN 2
-Continue symbicort 160mcg 2 puffs BID
-Continue symbicort 160mcg 2 puffs BID  -no SOB currently, no wheezing noted
-recent ERIKA shows preserved EF, normal LV systolic function, not concerning for CHF   -Continue symbicort BID
-recent ERIKA shows preserved EF, normal LV systolic function, not concerning for CHF   -Continue symbicort BID

## 2018-01-02 NOTE — PROGRESS NOTE ADULT - SUBJECTIVE AND OBJECTIVE BOX
Cardiology/Vascular Medicine Inpatient Progress Note    s/p Stage 1 revision infected knee prosthesis  Clinically stable from cardiovascular perspective  Blood cultures NGTD  Reviewed limited TTE performed last night.  No obvious vegetation.  From our perspective, no current need for ERIKA.  Recommend continuing with long-term antibiotic regimen x 6 weeks.  On DAPT    Vital Signs Last 24 Hrs  T(C): 37.2 (02 Jan 2018 05:24), Max: 37.4 (01 Jan 2018 21:49)  T(F): 99 (02 Jan 2018 05:24), Max: 99.3 (01 Jan 2018 21:49)  HR: 74 (02 Jan 2018 05:24) (62 - 78)  BP: 157/50 (02 Jan 2018 05:24) (121/45 - 157/55)  BP(mean): --  RR: 16 (02 Jan 2018 05:24) (16 - 18)  SpO2: 95% (02 Jan 2018 05:24) (95% - 98%)    Appearance: Normal	  HEENT:   Normal oral mucosa, PERRL, EOMI	  Lymphatic: No lymphadenopathy  Cardiovascular: Normal S1 S2, No JVD, No murmurs, No edema  Respiratory: Lungs clear to auscultation	  Psychiatry: A & O x 3, Mood & affect appropriate  Gastrointestinal:  Soft, Non-tender, + BS	  Skin: No rashes, No ecchymoses, No cyanosis	  Neurologic: Non-focal  Extremities: Normal range of motion, No clubbing, cyanosis, +dressings    MEDICATIONS  (STANDING):  aspirin enteric coated 81 milliGRAM(s) Oral daily  buDESOnide 160 MICROgram(s)/formoterol 4.5 MICROgram(s) Inhaler 2 Puff(s) Inhalation two times a day  cefTRIAXone   IVPB      cefTRIAXone   IVPB 2 Gram(s) IV Intermittent every 24 hours  clopidogrel Tablet 75 milliGRAM(s) Oral daily  docusate sodium 100 milliGRAM(s) Oral two times a day  heparin  Injectable 5000 Unit(s) SubCutaneous every 8 hours  pantoprazole    Tablet 40 milliGRAM(s) Oral before breakfast  predniSONE   Tablet 2.5 milliGRAM(s) Oral daily  simvastatin 20 milliGRAM(s) Oral at bedtime    MEDICATIONS  (PRN):  acetaminophen   Tablet. 650 milliGRAM(s) Oral every 6 hours PRN Mild Pain (1 - 3)  melatonin 6 milliGRAM(s) Oral at bedtime PRN Insomnia  nystatin Powder 1 Application(s) Topical two times a day PRN rash  oxyCODONE    5 mG/acetaminophen 325 mG 1 Tablet(s) Oral every 4 hours PRN Moderate Pain (4 - 6)  oxyCODONE    IR 10 milliGRAM(s) Oral two times a day PRN Severe Pain (7 - 10)      LABS:	 	                    < from: Transthoracic Echocardiogram (12.19.17 @ 07:36) >  Patient name: IRASEMA KOHLER  YOB: 1940   Age: 77 (F)   MR#: [id number]  Study Date: 12/19/2017  Location: O/PSonographer: Lauren Finkelstein  Study quality: Technically good  Referring Physician: Roverto Roblero MD  Blood Pressure: 152/63 mmHg  Height: 160 cm  Weight: 120 kg  BSA: 2.2 m2  ------------------------------------------------------------------------  PT/INR - ( 24 Dec 2017 14:47 )   PT: 12.4 SEC;   INR: 1.08          PTT - ( 24 Dec 2017 14:47 )  PTT:31.8 SECPROCEDURE: Transthoracic echocardiogram with 2-D, M-Mode  and complete spectral and color flow Doppler.  INDICATION: Atherosclerotic heart disease of native  coronary artery without angina pectoris (I25.10),  Unspecified diastolic (congestive) heart failure (I50.30)  ------------------------------------------------------------------------  DIMENSIONS:  Dimensions:     Normal Values:  LA:     3.5 cm    2.0 - 4.0 cm  Ao:     2.2 cm    2.0 - 3.8 cm  SEPTUM: 0.9 cm    0.6 - 1.2 cm  PWT:    0.8 cm    0.6 - 1.1 cm  LVIDd:  4.6 cm    3.0 - 5.6 cm  LVIDs:  2.6 cm    1.8 - 4.0 cm  Derived Variables:  LVMI: 59 g/m2  RWT: 0.34  Fractional short: 43 %  Ejection Fraction (Teicholtz): 75 %  ------------------------------------------------------------------------  OBSERVATIONS:  Mitral Valve: Mitral annular calcification and calcified  mitral leaflets with normal diastolic opening. Mild mitral  regurgitation.  Mean transmitral valve gradient equals 5 mm  Hg, consistent with moderate mitral stenosis.  Aortic Root: Normal aortic root.  Aortic Valve: TAVR noted. Peak transaortic valve gradient  equals 27 mm Hg, mean transaorticvalve gradient equals 13  mm Hg, which is probably normal in the presence of a  prosthetic valve. Minimal aortic regurgitation.  Left Atrium: Normal left atrium.  LA volume index = 31  cc/m2.  Left Ventricle: Normal left ventricular systolic function.  No segmental wall motion abnormalities. Normal left  ventricular internal dimensions and wall thicknesses.  Right Heart: Normal right atrium. Normal right ventricular  size and function. Normal tricuspid valve.  Mild tricuspid  regurgitation. Normal pulmonic valve. Minimal pulmonic  regurgitation.  Pericardium/PleuraNormal pericardium with no pericardial  effusion.  Hemodynamic: Estimated right ventricular systolic pressure  equals 41 mm Hg, assuming right atrial pressure equals 10  mm Hg, consistent with mild pulmonary hypertension.  ------------------------------------------------------------------------  CONCLUSIONS:  1. TAVR noted. Peak transaortic valve gradient equals 27 mm  Hg, mean transaortic valve gradient equals 13 mm Hg, which  is probably normal in the presence of a prosthetic valve.  Minimal aortic regurgitation.  2. Normal left ventricular internal dimensions and wall  thicknesses.  3. Normal left ventricular systolic function. No segmental  wall motion abnormalities.  4.Normal right ventricular size and function.  5. Normal tricuspid valve.  Mild tricuspid regurgitation.  6. Estimated pulmonary artery systolic pressure equals 41  mm Hg, assuming right atrial pressure equals 10  mm Hg,  consistent with mild pulmonaryhypertension.  ------------------------------------------------------------------------  Confirmed on  12/20/2017 - 07:16:23 by Roverto Roblero MD,  [facility], [facility], [facility]  ------------------------------------------------------------------------    < end of copied text >    < from: Limited Transthoracic Echo (12.27.17 @ 14:06) >  Patient name: IRASEMA KOHLER  YOB: 1940   Age: 77 (F)   MR#: [id number]  Study Date: 12/27/2017  Location: [id number]: Lauren Finkelstein  Study quality: Limited  Referring Physician: Sammy Galvan MD / Garo oSrto MD / Roverto Roblero MD  Blood Pressure: 139/52 mmHg  Height: 157 cm  Weight: 131 kg  BSA: 2.2 m2  ------------------------------------------------------------------------  PROCEDURE: Limited transthoracic echocardiogram with 2-D.  M-Mode and spectral and color flow Doppler.  INDICATION: Endocarditis, valve unspecified (I38)  ------------------------------------------------------------------------  OBSERVATIONS:  Mitral Valve: Mitral annular calcification and calcified  mitral leaflets with decreased diastolic opening. Peak  mitral valve gradient equals 14 mm Hg, mean transmitral  valve gradient equals 6 mm Hg, consistent with moderate  mitral stenosis.  Aortic Root: Normal aortic root, aortic arch and descending  thoracic aorta.  Aortic Valve:Bioprosthetic aortic valve replacement. s/p  TAVR.  Left Atrium: Normal left atrium.  Left Ventricle: Normal left ventricular systolic function.  No segmental wall motion abnormalities. Normal left  ventricular internal dimensions and wall thicknesses.  Right Heart: Normal right atrium. Normal right ventricular  size and function. Normal tricuspid valve. Normal pulmonic  valve.  Pericardium/PleuraNormal pericardium with no pericardial  effusion.  ------------------------------------------------------------------------  CONCLUSIONS:  1. Mitral annular calcification and calcified mitral  leaflets with decreased diastolic opening. Peak mitral  valve gradient equals 14 mm Hg, mean transmitral valve  gradient equals 6 mm Hg, consistent with moderate mitral  stenosis.  2. Bioprosthetic aortic valve replacement. s/p TAVR.  3. Normal left ventricular systolic function. No segmental  wall motion abnormalities.  *** Compared with echocardiogram of 12/19/2017, no  significant changes noted. There isno obvious evidence of  endocarditis.  ------------------------------------------------------------------------  Confirmed on  12/27/2017 - 17:13:04 by Filipe Esparza M.D.  ------------------------------------------------------------------------    < end of copied text >

## 2018-01-02 NOTE — PROGRESS NOTE ADULT - SUBJECTIVE AND OBJECTIVE BOX
Patient is seen and examined. No acute events overnight. Pain is controlled.     Vital Signs Last 24 Hrs  T(C): 37.2 (02 Jan 2018 05:24), Max: 37.4 (01 Jan 2018 21:49)  T(F): 99 (02 Jan 2018 05:24), Max: 99.3 (01 Jan 2018 21:49)  HR: 74 (02 Jan 2018 05:24) (62 - 78)  BP: 157/50 (02 Jan 2018 05:24) (121/45 - 157/55)  BP(mean): --  RR: 16 (02 Jan 2018 05:24) (16 - 18)  SpO2: 95% (02 Jan 2018 05:24) (95% - 98%)    Gen: NAD      RLE: Dressing C/D/I. Brace in place, locked in extension  Motor intact +TA/GS/EHL/FHL. Sensation is grossly intact distal. Compartments are soft, nontender. DP 2+     Labs:              A/P: Patient is a 77yFemale s/p stage I revision R TKA, POD # 10    - Pain control / Analgesia  - Abx as per ID  - Anticoagulation- plavix, ASA, SQH  -PT/OT  -PWB in brace locked in ext  -OOB  -Inc Spirometry  -Foot Pumps  -Rehab pending, continue PWB, brace locked at all times, have sutures/staples removed POD#21, FU in Dr. Bass's office  4 weeks post op (have patient call to make appointment [phone])

## 2018-01-02 NOTE — PROGRESS NOTE ADULT - ATTENDING COMMENTS
I agree with the above note on this patient. All pertinent films have been reviewed. Please refer to clinical documentation of the history, physical examinations, data summary, and both assessment and plan as documented above and with which I agree.    Nate Bass MD  Attending Orthopedic Surgeon

## 2018-01-02 NOTE — PROGRESS NOTE ADULT - ATTENDING COMMENTS
Patient remains medically stable for discharge. Awaiting updated PT note for SW to send SLADE for SALAZAR.     Further details of plan per resident note above Patient remains medically stable for discharge. Awaiting updated PT note for SW to send SLADE for SALAZAR.     Further details of plan per resident note above    DISCHARGE TIME- 36 MINUTES SPENT PREPARING DISCHARGE, INCLUDING PAPERWORK, MEDICATION RECONCILIATION, AND COUNSELLING OF PATIENT.

## 2018-01-02 NOTE — PROGRESS NOTE ADULT - SUBJECTIVE AND OBJECTIVE BOX
CC: F/u septic arthritis     INTERVAL/OVERNIGHT EVENTS:     No overnight events, no complaints this morning. Denies any fevers, chills, or SOB. Pt eager to work with PT today to get OOB again and make progress with regards to mobility and strength.     MEDICATIONS  (STANDING):  aspirin enteric coated 81 milliGRAM(s) Oral daily  buDESOnide 160 MICROgram(s)/formoterol 4.5 MICROgram(s) Inhaler 2 Puff(s) Inhalation two times a day  cefTRIAXone   IVPB      cefTRIAXone   IVPB 2 Gram(s) IV Intermittent every 24 hours  clopidogrel Tablet 75 milliGRAM(s) Oral daily  docusate sodium 100 milliGRAM(s) Oral two times a day  heparin  Injectable 5000 Unit(s) SubCutaneous every 8 hours  pantoprazole    Tablet 40 milliGRAM(s) Oral before breakfast  predniSONE   Tablet 2.5 milliGRAM(s) Oral daily  simvastatin 20 milliGRAM(s) Oral at bedtime    MEDICATIONS  (PRN):  acetaminophen   Tablet. 650 milliGRAM(s) Oral every 6 hours PRN Mild Pain (1 - 3)  melatonin 6 milliGRAM(s) Oral at bedtime PRN Insomnia  nystatin Powder 1 Application(s) Topical two times a day PRN rash  oxyCODONE    5 mG/acetaminophen 325 mG 1 Tablet(s) Oral every 4 hours PRN Moderate Pain (4 - 6)  oxyCODONE    IR 10 milliGRAM(s) Oral two times a day PRN Severe Pain (7 - 10)    Allergies    No Known Allergies    Intolerances    IV Contrast (Flushing (Skin); Nausea)      All other systems reviewed and negative [ x]     VITAL SIGNS AND PHYSICAL EXAM:  Vital Signs Last 24 Hrs  T(C): 37.2 (02 Jan 2018 05:24), Max: 37.4 (01 Jan 2018 21:49)  T(F): 99 (02 Jan 2018 05:24), Max: 99.3 (01 Jan 2018 21:49)  HR: 74 (02 Jan 2018 05:24) (62 - 78)  BP: 157/50 (02 Jan 2018 05:24) (121/45 - 157/55)  BP(mean): --  RR: 16 (02 Jan 2018 05:24) (16 - 18)  SpO2: 95% (02 Jan 2018 05:24) (95% - 98%)  I&O's Summary    01 Jan 2018 07:01 - 02 Jan 2018 07:00  --------------------------------------------------------  IN: 0 mL / OUT: 500 mL / NET: -500 mL    Gen: No acute distress  HEENT: Normocephalic, atraumatic, extraocular movements intact, conjunctiva clear without ictuers  CV: Regular rate and rhythm, no murmurs, rubs, or gallops  Resp: Non-labored, clear to auscultation bilaterally  Abd: soft, non-tender, non-distended  Skin: no rash  Neuro: grossly normal   Ext: brace on RLE. peripheral pulses 1+ b/l          INTERVAL LAB RESULTS:    none        INTERVAL IMAGING STUDIES:  none

## 2018-01-02 NOTE — PROGRESS NOTE ADULT - PROVIDER SPECIALTY LIST ADULT
Cardiology
Infectious Disease
Internal Medicine
Orthopedics
SICU
Orthopedics
Cardiology
Internal Medicine

## 2018-01-02 NOTE — PROGRESS NOTE ADULT - PROBLEM SELECTOR PLAN 8
-Clopidogrel 75mg daily  -Heparin 5000 units SubQ q8h

## 2018-01-04 ENCOUNTER — CHART COPY (OUTPATIENT)
Age: 78
End: 2018-01-04

## 2018-01-11 ENCOUNTER — OTHER (OUTPATIENT)
Age: 78
End: 2018-01-11

## 2018-01-11 ENCOUNTER — EMERGENCY (EMERGENCY)
Facility: HOSPITAL | Age: 78
LOS: 1 days | Discharge: HOME | End: 2018-01-11
Attending: EMERGENCY MEDICINE
Payer: MEDICARE

## 2018-01-11 VITALS
SYSTOLIC BLOOD PRESSURE: 112 MMHG | DIASTOLIC BLOOD PRESSURE: 55 MMHG | HEART RATE: 90 BPM | RESPIRATION RATE: 16 BRPM | OXYGEN SATURATION: 96 %

## 2018-01-11 VITALS
SYSTOLIC BLOOD PRESSURE: 115 MMHG | DIASTOLIC BLOOD PRESSURE: 69 MMHG | HEART RATE: 80 BPM | RESPIRATION RATE: 17 BRPM | OXYGEN SATURATION: 96 %

## 2018-01-11 DIAGNOSIS — Z98.890 OTHER SPECIFIED POSTPROCEDURAL STATES: Chronic | ICD-10-CM

## 2018-01-11 DIAGNOSIS — R04.0 EPISTAXIS: ICD-10-CM

## 2018-01-11 LAB
BASOPHILS # BLD AUTO: 0.1 K/UL — SIGNIFICANT CHANGE UP (ref 0–0.2)
BASOPHILS NFR BLD AUTO: 0.9 % — SIGNIFICANT CHANGE UP (ref 0–2)
EOSINOPHIL # BLD AUTO: 0.3 K/UL — SIGNIFICANT CHANGE UP (ref 0–0.5)
EOSINOPHIL NFR BLD AUTO: 3.5 % — SIGNIFICANT CHANGE UP (ref 0–6)
HCT VFR BLD CALC: 25.5 % — LOW (ref 34.5–45)
HGB BLD-MCNC: 8.3 G/DL — LOW (ref 11.5–15.5)
LYMPHOCYTES # BLD AUTO: 1.6 K/UL — SIGNIFICANT CHANGE UP (ref 1–3.3)
LYMPHOCYTES # BLD AUTO: 20.7 % — SIGNIFICANT CHANGE UP (ref 13–44)
MCHC RBC-ENTMCNC: 28.5 PG — SIGNIFICANT CHANGE UP (ref 27–34)
MCHC RBC-ENTMCNC: 32.4 GM/DL — SIGNIFICANT CHANGE UP (ref 32–36)
MCV RBC AUTO: 88 FL — SIGNIFICANT CHANGE UP (ref 80–100)
MONOCYTES # BLD AUTO: 0.6 K/UL — SIGNIFICANT CHANGE UP (ref 0–0.9)
MONOCYTES NFR BLD AUTO: 7.4 % — SIGNIFICANT CHANGE UP (ref 2–14)
NEUTROPHILS # BLD AUTO: 5.3 K/UL — SIGNIFICANT CHANGE UP (ref 1.8–7.4)
NEUTROPHILS NFR BLD AUTO: 67.4 % — SIGNIFICANT CHANGE UP (ref 43–77)
PLATELET # BLD AUTO: 198 K/UL — SIGNIFICANT CHANGE UP (ref 150–400)
RBC # BLD: 2.9 M/UL — LOW (ref 3.8–5.2)
RBC # FLD: 14.7 % — HIGH (ref 10.3–14.5)
WBC # BLD: 7.9 K/UL — SIGNIFICANT CHANGE UP (ref 3.8–10.5)
WBC # FLD AUTO: 7.9 K/UL — SIGNIFICANT CHANGE UP (ref 3.8–10.5)

## 2018-01-11 PROCEDURE — 99284 EMERGENCY DEPT VISIT MOD MDM: CPT | Mod: GC

## 2018-01-11 PROCEDURE — 30903 CONTROL OF NOSEBLEED: CPT | Mod: RT

## 2018-01-11 PROCEDURE — 85027 COMPLETE CBC AUTOMATED: CPT

## 2018-01-11 PROCEDURE — 99284 EMERGENCY DEPT VISIT MOD MDM: CPT | Mod: 25

## 2018-01-11 RX ORDER — PHENYLEPHRINE HCL 0.25 %
1 AEROSOL, SPRAY WITH PUMP (ML) NASAL ONCE
Qty: 0 | Refills: 0 | Status: COMPLETED | OUTPATIENT
Start: 2018-01-11 | End: 2018-01-11

## 2018-01-11 RX ORDER — ACETAMINOPHEN 500 MG
650 TABLET ORAL ONCE
Qty: 0 | Refills: 0 | Status: COMPLETED | OUTPATIENT
Start: 2018-01-11 | End: 2018-01-11

## 2018-01-11 RX ORDER — OXYMETAZOLINE HYDROCHLORIDE 0.5 MG/ML
2 SPRAY NASAL ONCE
Qty: 0 | Refills: 0 | Status: DISCONTINUED | OUTPATIENT
Start: 2018-01-11 | End: 2018-01-11

## 2018-01-11 RX ADMIN — Medication 650 MILLIGRAM(S): at 17:07

## 2018-01-11 RX ADMIN — Medication 1 SPRAY(S): at 12:52

## 2018-01-11 NOTE — ED PROVIDER NOTE - PROGRESS NOTE DETAILS
patient seen by ENT, anterior packing placed. will follow-up with Dr. Espinoza on Monday. - resident Sebastian Hay LOLLY Hess MD: Seen by ENT, anterior packing placed which controlled the bleeding. They recommend keflex x 3 days for ppx and f/u on Monday in the ED by ENT to see if packing can be removed (per ENT, since pt cannot ambulate, she cannot be seen in clinic and must come to ED). Return precautions given to pt. Knows to f/u in ED on Monday and to return to the ED sooner for any worsening/concerning sx.

## 2018-01-11 NOTE — ED PROVIDER NOTE - PHYSICAL EXAMINATION
General: well appearing female in no acute distress   HEENT: no active bleeding from right nostril, visible bleeding into oropharynx  Respiratory: normal work of breathing, lungs clear to auscultation bilaterally   Cardiac: regular rate and rhythm

## 2018-01-11 NOTE — ED PROVIDER NOTE - PLAN OF CARE
Please follow-up with your primary care doctor in the next 24-48 hours   Please follow-up with Dr. Espinoza ([phone]) on Monday for packing removal   If you have any bleeding that cannot be stopped, chest pain, dizziness or difficulty breathing please return to the emergency department

## 2018-01-11 NOTE — ED PROVIDER NOTE - MEDICAL DECISION MAKING DETAILS
LOLLY Hess MD: Pt is a 76 y/o female with PMH morbid obesity, AS s/p TAVR, bilateral knee OA s/p TKA (2002), polymyalgia on chronic prednisone, recent admission for septic arthritis c/b bacteremia, on ASA and plavix who p/w atraumatic epistaxis. Epistaxis began at 10am, from R nare, also dripping down throat. No h/o prior epistaxis. Denies CP, dizziness, LH, SOB. Exam with +erythema to R nare without obvious source of bleeding, +dripping of blood down back of throat. DDx: anterior vs posterior nosebleed, possible anemia 2/2 epistaxis. Plan: CBC, neosynepherine nasal spray to R nare, nasal clip for tamponade of bleeding, ENT for evaluation (per pt, her physician spoke with ENT and Dr. Espinoza is aware that pt is coming to ED for evaluation)

## 2018-01-11 NOTE — CONSULT NOTE ADULT - SUBJECTIVE AND OBJECTIVE BOX
CC: Right sided nose bleed since 10am    HPI: Patient is a 77y old Female who presents with a chief complaint of right sided nose bleed since 10am. Pt is currently residing at [facility]. Pt was walking with physical therapists and began bleeding from right nare. Pt states that blood was dripping down her throat, causing her to spit it out. Direct pressure was applied but pt continued to bleed, prompting her to come to the ER. Pt is on aspirin and plavix. Pt admits to nausea but denies nasal trauma, recent sinusitis, history of nose bleeds. BP well controlled.    PAST MEDICAL & SURGICAL HISTORY:  CAD (coronary artery disease): HERBERT to LAD 11/2016  Aortic stenosis, severe  Uncomplicated asthma, unspecified asthma severity  Polymyalgia  Lumbar disc disease with radiculopathy  Spider veins  Anxiety  Severe aortic stenosis by prior echocardiogram: 2013 and  11/2016  Dysphagia  Morbid obesity with BMI of 50.0-59.9, adult  Macular degeneration  Hiatal hernia  GERD (gastroesophageal reflux disease)  Sciatica  Osteoarthritis  H/O cardiac catheterization: 11/29/16 HERBERT placed on LAD  H/O endoscopy: with dilatation of esophageal stricture 2013  S/P cataract surgery: x2; 3-4yr ago  S/P gastroplasty: 28 yrs ago  S/P cholecystectomy: more than 15 years ago  S/P total knee replacement: left, 15yr ago  S/P total knee replacement: right, 12yr ago  S/P hysterectomy: 24yr ago    Allergies    No Known Allergies    Intolerances    IV Contrast (Flushing (Skin); Nausea)    MEDICATIONS  (STANDING):    MEDICATIONS  (PRN):    Social History: .    ROS: ENT, GI, , CV, Pulm, Neuro, Psych, MS, Heme, Endo, Constitutional; all negative except as noted in HPI    Vital Signs Last 24 Hrs  T(C): 36.6 (11 Jan 2018 12:36), Max: 36.6 (11 Jan 2018 12:36)  T(F): 97.9 (11 Jan 2018 12:36), Max: 97.9 (11 Jan 2018 12:36)  HR: 95 (11 Jan 2018 12:36) (90 - 95)  BP: 108/56 (11 Jan 2018 12:36) (108/56 - 112/55)  BP(mean): --  RR: 16 (11 Jan 2018 12:36) (16 - 16)  SpO2: 93% (11 Jan 2018 12:36) (93% - 96%)                          8.3    7.9   )-----------( 198      ( 11 Jan 2018 12:44 )             25.5             PHYSICAL EXAM:  Gen: NAD, well-developed  Head: Normocephalic, Atraumatic  Face: no edema/erythema/fluctuance, parotid glands soft without mass  Eyes: PERRL, EOMI, no scleral injection  Nose: Bloody discharge from right nare, no obvious source of bleed from anterior nasal septum, no septal perforation, no blood clots  Mouth: Actively bleeding from oropharynx, small blood clot noted, no masses or lesions, uvula midline  Neck: Flat, supple, no lymphadenopathy, trachea midline, no masses  Resp: no use of accessory muscles, no stridor  CV: no peripheral edema/cyanosis      Procedure: Nasal packing (Merocel) placed in right nare to control bleeding. Pt tolerated procedure well without complications.

## 2018-01-11 NOTE — ED PROVIDER NOTE - CARE PLAN
Principal Discharge DX:	Epistaxis  Instructions for follow-up, activity and diet:	Please follow-up with your primary care doctor in the next 24-48 hours   Please follow-up with Dr. Espinoza ([phone]) on Monday for packing removal   If you have any bleeding that cannot be stopped, chest pain, dizziness or difficulty breathing please return to the emergency department

## 2018-01-11 NOTE — ED PROVIDER NOTE - OBJECTIVE STATEMENT
77F, PMH of aortic stenosis CAD, coronary stents presenting with epistaxis for the past two hours. Patient is currently at [facility] and after sitting down her right nostril spontaneously began to bleed. Denies trauma, prior history of bleeding or bleeding disorders, chest pain, difficulty breathing or abdominal pain. Patient has been spitting up a large amount of blood. Sorenson called Dr. Espinoza who is the ENT on call.

## 2018-01-11 NOTE — ED ADULT NURSE NOTE - OBJECTIVE STATEMENT
76 yo female A&OX3 presents to the ED with the c/o nose bleed. Pt states that she is on ASA and Plavix. Pt states that she started nose bleed this morning, passing blood clot, + coughing. States that she has is feeling blood i9n the back of her throat. No diff breathing, no sob. Pt denies fevers and chills. Lungs clear, equal b/l, no dizziness. Abd round, soft non tender and non distended. Coming from [facility] rehab for surgery done on r knee s/p infection. No fevers or chills.

## 2018-01-12 LAB — SURGICAL PATHOLOGY STUDY: SIGNIFICANT CHANGE UP

## 2018-01-16 ENCOUNTER — APPOINTMENT (OUTPATIENT)
Dept: ORTHOPEDIC SURGERY | Facility: CLINIC | Age: 78
End: 2018-01-16
Payer: MEDICARE

## 2018-01-16 VITALS
BODY MASS INDEX: 50.03 KG/M2 | HEIGHT: 61 IN | WEIGHT: 265 LBS | SYSTOLIC BLOOD PRESSURE: 138 MMHG | DIASTOLIC BLOOD PRESSURE: 79 MMHG | HEART RATE: 105 BPM

## 2018-01-16 PROCEDURE — 86077 PHYS BLOOD BANK SERV XMATCH: CPT

## 2018-01-16 PROCEDURE — 99024 POSTOP FOLLOW-UP VISIT: CPT

## 2018-01-16 PROCEDURE — 73560 X-RAY EXAM OF KNEE 1 OR 2: CPT | Mod: RT

## 2018-01-22 PROCEDURE — 86077 PHYS BLOOD BANK SERV XMATCH: CPT

## 2018-01-23 ENCOUNTER — INPATIENT (INPATIENT)
Facility: HOSPITAL | Age: 78
LOS: 5 days | Discharge: HOME | DRG: 300 | End: 2018-01-29
Attending: INTERNAL MEDICINE | Admitting: INTERNAL MEDICINE
Payer: MEDICARE

## 2018-01-23 VITALS
SYSTOLIC BLOOD PRESSURE: 114 MMHG | DIASTOLIC BLOOD PRESSURE: 48 MMHG | RESPIRATION RATE: 16 BRPM | TEMPERATURE: 96 F | HEART RATE: 78 BPM

## 2018-01-23 DIAGNOSIS — R74.8 ABNORMAL LEVELS OF OTHER SERUM ENZYMES: ICD-10-CM

## 2018-01-23 DIAGNOSIS — J18.9 PNEUMONIA, UNSPECIFIED ORGANISM: ICD-10-CM

## 2018-01-23 DIAGNOSIS — Z98.890 OTHER SPECIFIED POSTPROCEDURAL STATES: Chronic | ICD-10-CM

## 2018-01-23 DIAGNOSIS — T14.8XXA OTHER INJURY OF UNSPECIFIED BODY REGION, INITIAL ENCOUNTER: ICD-10-CM

## 2018-01-23 DIAGNOSIS — K21.9 GASTRO-ESOPHAGEAL REFLUX DISEASE WITHOUT ESOPHAGITIS: ICD-10-CM

## 2018-01-23 DIAGNOSIS — J45.909 UNSPECIFIED ASTHMA, UNCOMPLICATED: ICD-10-CM

## 2018-01-23 DIAGNOSIS — N17.9 ACUTE KIDNEY FAILURE, UNSPECIFIED: ICD-10-CM

## 2018-01-23 DIAGNOSIS — Z96.659 PRESENCE OF UNSPECIFIED ARTIFICIAL KNEE JOINT: ICD-10-CM

## 2018-01-23 DIAGNOSIS — M35.3 POLYMYALGIA RHEUMATICA: ICD-10-CM

## 2018-01-23 DIAGNOSIS — I35.0 NONRHEUMATIC AORTIC (VALVE) STENOSIS: ICD-10-CM

## 2018-01-23 LAB
ALBUMIN SERPL ELPH-MCNC: 2.7 G/DL — LOW (ref 3.3–5)
ALP SERPL-CCNC: 72 U/L — SIGNIFICANT CHANGE UP (ref 40–120)
ALT FLD-CCNC: 8 U/L RC — LOW (ref 10–45)
ANION GAP SERPL CALC-SCNC: 13 MMOL/L — SIGNIFICANT CHANGE UP (ref 5–17)
ANION GAP SERPL CALC-SCNC: 14 MMOL/L — SIGNIFICANT CHANGE UP (ref 5–17)
APPEARANCE UR: ABNORMAL
APTT BLD: 32.7 SEC — SIGNIFICANT CHANGE UP (ref 27.5–37.4)
AST SERPL-CCNC: 16 U/L — SIGNIFICANT CHANGE UP (ref 10–40)
BASE EXCESS BLDV CALC-SCNC: 0.8 MMOL/L — SIGNIFICANT CHANGE UP (ref -2–2)
BASOPHILS # BLD AUTO: 0 K/UL — SIGNIFICANT CHANGE UP (ref 0–0.2)
BASOPHILS NFR BLD AUTO: 0.2 % — SIGNIFICANT CHANGE UP (ref 0–2)
BILIRUB SERPL-MCNC: 0.3 MG/DL — SIGNIFICANT CHANGE UP (ref 0.2–1.2)
BILIRUB UR-MCNC: NEGATIVE — SIGNIFICANT CHANGE UP
BUN SERPL-MCNC: 26 MG/DL — HIGH (ref 7–23)
BUN SERPL-MCNC: 30 MG/DL — HIGH (ref 7–23)
CA-I SERPL-SCNC: 1.23 MMOL/L — SIGNIFICANT CHANGE UP (ref 1.12–1.3)
CALCIUM SERPL-MCNC: 8.8 MG/DL — SIGNIFICANT CHANGE UP (ref 8.4–10.5)
CALCIUM SERPL-MCNC: 8.8 MG/DL — SIGNIFICANT CHANGE UP (ref 8.4–10.5)
CHLORIDE BLDV-SCNC: 104 MMOL/L — SIGNIFICANT CHANGE UP (ref 96–108)
CHLORIDE SERPL-SCNC: 103 MMOL/L — SIGNIFICANT CHANGE UP (ref 96–108)
CHLORIDE SERPL-SCNC: 104 MMOL/L — SIGNIFICANT CHANGE UP (ref 96–108)
CK MB BLD-MCNC: 4.8 % — HIGH (ref 0–3.5)
CK MB CFR SERPL CALC: 1.2 NG/ML — SIGNIFICANT CHANGE UP (ref 0–3.8)
CK MB CFR SERPL CALC: 1.7 NG/ML — SIGNIFICANT CHANGE UP (ref 0–3.8)
CK SERPL-CCNC: 25 U/L — SIGNIFICANT CHANGE UP (ref 25–170)
CK SERPL-CCNC: 31 U/L — SIGNIFICANT CHANGE UP (ref 25–170)
CO2 BLDV-SCNC: 28 MMOL/L — SIGNIFICANT CHANGE UP (ref 22–30)
CO2 SERPL-SCNC: 21 MMOL/L — LOW (ref 22–31)
CO2 SERPL-SCNC: 22 MMOL/L — SIGNIFICANT CHANGE UP (ref 22–31)
COLOR SPEC: YELLOW — SIGNIFICANT CHANGE UP
CREAT SERPL-MCNC: 1.9 MG/DL — HIGH (ref 0.5–1.3)
CREAT SERPL-MCNC: 1.97 MG/DL — HIGH (ref 0.5–1.3)
DIFF PNL FLD: NEGATIVE — SIGNIFICANT CHANGE UP
EOSINOPHIL # BLD AUTO: 0.1 K/UL — SIGNIFICANT CHANGE UP (ref 0–0.5)
EOSINOPHIL NFR BLD AUTO: 0.4 % — SIGNIFICANT CHANGE UP (ref 0–6)
EPI CELLS # UR: SIGNIFICANT CHANGE UP /HPF
FUNGUS SPEC QL CULT: SIGNIFICANT CHANGE UP
FUNGUS SPEC QL CULT: SIGNIFICANT CHANGE UP
GAS PNL BLDV: 136 MMOL/L — SIGNIFICANT CHANGE UP (ref 136–145)
GAS PNL BLDV: SIGNIFICANT CHANGE UP
GLUCOSE BLDV-MCNC: 109 MG/DL — HIGH (ref 70–99)
GLUCOSE SERPL-MCNC: 106 MG/DL — HIGH (ref 70–99)
GLUCOSE SERPL-MCNC: 119 MG/DL — HIGH (ref 70–99)
GLUCOSE UR QL: NEGATIVE — SIGNIFICANT CHANGE UP
HCO3 BLDV-SCNC: 26 MMOL/L — SIGNIFICANT CHANGE UP (ref 21–29)
HCT VFR BLD CALC: 24.2 % — LOW (ref 34.5–45)
HCT VFR BLDA CALC: 23 % — LOW (ref 39–50)
HGB BLD CALC-MCNC: 7.5 G/DL — LOW (ref 11.5–15.5)
HGB BLD-MCNC: 8 G/DL — LOW (ref 11.5–15.5)
INR BLD: 1.36 RATIO — HIGH (ref 0.88–1.16)
KETONES UR-MCNC: NEGATIVE — SIGNIFICANT CHANGE UP
LACTATE BLDV-MCNC: 0.8 MMOL/L — SIGNIFICANT CHANGE UP (ref 0.7–2)
LEUKOCYTE ESTERASE UR-ACNC: NEGATIVE — SIGNIFICANT CHANGE UP
LYMPHOCYTES # BLD AUTO: 1.7 K/UL — SIGNIFICANT CHANGE UP (ref 1–3.3)
LYMPHOCYTES # BLD AUTO: 12.5 % — LOW (ref 13–44)
MAGNESIUM SERPL-MCNC: 2.1 MG/DL — SIGNIFICANT CHANGE UP (ref 1.6–2.6)
MCHC RBC-ENTMCNC: 29.7 PG — SIGNIFICANT CHANGE UP (ref 27–34)
MCHC RBC-ENTMCNC: 33.1 GM/DL — SIGNIFICANT CHANGE UP (ref 32–36)
MCV RBC AUTO: 89.7 FL — SIGNIFICANT CHANGE UP (ref 80–100)
MONOCYTES # BLD AUTO: 1.3 K/UL — HIGH (ref 0–0.9)
MONOCYTES NFR BLD AUTO: 9.7 % — SIGNIFICANT CHANGE UP (ref 2–14)
NEUTROPHILS # BLD AUTO: 10.5 K/UL — HIGH (ref 1.8–7.4)
NEUTROPHILS NFR BLD AUTO: 77.2 % — HIGH (ref 43–77)
NITRITE UR-MCNC: NEGATIVE — SIGNIFICANT CHANGE UP
NT-PROBNP SERPL-SCNC: 4080 PG/ML — HIGH (ref 0–300)
PCO2 BLDV: 52 MMHG — HIGH (ref 35–50)
PH BLDV: 7.33 — LOW (ref 7.35–7.45)
PH UR: 5.5 — SIGNIFICANT CHANGE UP (ref 5–8)
PHOSPHATE SERPL-MCNC: 5.1 MG/DL — HIGH (ref 2.5–4.5)
PLATELET # BLD AUTO: 204 K/UL — SIGNIFICANT CHANGE UP (ref 150–400)
PO2 BLDV: 42 MMHG — SIGNIFICANT CHANGE UP (ref 25–45)
POTASSIUM BLDV-SCNC: 5.4 MMOL/L — HIGH (ref 3.5–5)
POTASSIUM SERPL-MCNC: 5.1 MMOL/L — SIGNIFICANT CHANGE UP (ref 3.5–5.3)
POTASSIUM SERPL-MCNC: 5.9 MMOL/L — HIGH (ref 3.5–5.3)
POTASSIUM SERPL-SCNC: 5.1 MMOL/L — SIGNIFICANT CHANGE UP (ref 3.5–5.3)
POTASSIUM SERPL-SCNC: 5.9 MMOL/L — HIGH (ref 3.5–5.3)
PROT SERPL-MCNC: 6.3 G/DL — SIGNIFICANT CHANGE UP (ref 6–8.3)
PROT UR-MCNC: 30 MG/DL
PROTHROM AB SERPL-ACNC: 14.8 SEC — HIGH (ref 9.8–12.7)
RAPID RVP RESULT: SIGNIFICANT CHANGE UP
RBC # BLD: 2.69 M/UL — LOW (ref 3.8–5.2)
RBC # FLD: 16.1 % — HIGH (ref 10.3–14.5)
RBC CASTS # UR COMP ASSIST: ABNORMAL /HPF (ref 0–2)
SAO2 % BLDV: 71 % — SIGNIFICANT CHANGE UP (ref 67–88)
SODIUM SERPL-SCNC: 138 MMOL/L — SIGNIFICANT CHANGE UP (ref 135–145)
SODIUM SERPL-SCNC: 139 MMOL/L — SIGNIFICANT CHANGE UP (ref 135–145)
SP GR SPEC: 1.02 — SIGNIFICANT CHANGE UP (ref 1.01–1.02)
TROPONIN T SERPL-MCNC: 0.05 NG/ML — SIGNIFICANT CHANGE UP (ref 0–0.06)
TROPONIN T SERPL-MCNC: 0.07 NG/ML — HIGH (ref 0–0.06)
UROBILINOGEN FLD QL: NEGATIVE — SIGNIFICANT CHANGE UP
WBC # BLD: 13.6 K/UL — HIGH (ref 3.8–10.5)
WBC # FLD AUTO: 13.6 K/UL — HIGH (ref 3.8–10.5)
WBC UR QL: ABNORMAL /HPF (ref 0–5)

## 2018-01-23 PROCEDURE — 71045 X-RAY EXAM CHEST 1 VIEW: CPT | Mod: 26

## 2018-01-23 PROCEDURE — 99223 1ST HOSP IP/OBS HIGH 75: CPT | Mod: AI

## 2018-01-23 PROCEDURE — 99285 EMERGENCY DEPT VISIT HI MDM: CPT

## 2018-01-23 PROCEDURE — 78582 LUNG VENTILAT&PERFUS IMAGING: CPT | Mod: 26

## 2018-01-23 PROCEDURE — 71250 CT THORAX DX C-: CPT | Mod: 26

## 2018-01-23 RX ORDER — CEFEPIME 1 G/1
1000 INJECTION, POWDER, FOR SOLUTION INTRAMUSCULAR; INTRAVENOUS EVERY 12 HOURS
Qty: 0 | Refills: 0 | Status: DISCONTINUED | OUTPATIENT
Start: 2018-01-23 | End: 2018-01-25

## 2018-01-23 RX ORDER — PANTOPRAZOLE SODIUM 20 MG/1
40 TABLET, DELAYED RELEASE ORAL
Qty: 0 | Refills: 0 | Status: DISCONTINUED | OUTPATIENT
Start: 2018-01-23 | End: 2018-01-29

## 2018-01-23 RX ORDER — ACETAMINOPHEN 500 MG
1000 TABLET ORAL ONCE
Qty: 0 | Refills: 0 | Status: COMPLETED | OUTPATIENT
Start: 2018-01-23 | End: 2018-01-23

## 2018-01-23 RX ORDER — FERROUS SULFATE 325(65) MG
325 TABLET ORAL DAILY
Qty: 0 | Refills: 0 | Status: DISCONTINUED | OUTPATIENT
Start: 2018-01-23 | End: 2018-01-29

## 2018-01-23 RX ORDER — ASPIRIN/CALCIUM CARB/MAGNESIUM 324 MG
81 TABLET ORAL DAILY
Qty: 0 | Refills: 0 | Status: DISCONTINUED | OUTPATIENT
Start: 2018-01-23 | End: 2018-01-29

## 2018-01-23 RX ORDER — ACETAMINOPHEN 500 MG
650 TABLET ORAL EVERY 6 HOURS
Qty: 0 | Refills: 0 | Status: DISCONTINUED | OUTPATIENT
Start: 2018-01-23 | End: 2018-01-29

## 2018-01-23 RX ORDER — INSULIN HUMAN 100 [IU]/ML
10 INJECTION, SOLUTION SUBCUTANEOUS ONCE
Qty: 0 | Refills: 0 | Status: COMPLETED | OUTPATIENT
Start: 2018-01-23 | End: 2018-01-23

## 2018-01-23 RX ORDER — SODIUM CHLORIDE 9 MG/ML
3 INJECTION INTRAMUSCULAR; INTRAVENOUS; SUBCUTANEOUS ONCE
Qty: 0 | Refills: 0 | Status: COMPLETED | OUTPATIENT
Start: 2018-01-23 | End: 2018-01-23

## 2018-01-23 RX ORDER — SIMVASTATIN 20 MG/1
20 TABLET, FILM COATED ORAL AT BEDTIME
Qty: 0 | Refills: 0 | Status: DISCONTINUED | OUTPATIENT
Start: 2018-01-23 | End: 2018-01-29

## 2018-01-23 RX ORDER — DEXTROSE 50 % IN WATER 50 %
25 SYRINGE (ML) INTRAVENOUS ONCE
Qty: 0 | Refills: 0 | Status: COMPLETED | OUTPATIENT
Start: 2018-01-23 | End: 2018-01-23

## 2018-01-23 RX ORDER — BUDESONIDE AND FORMOTEROL FUMARATE DIHYDRATE 160; 4.5 UG/1; UG/1
2 AEROSOL RESPIRATORY (INHALATION)
Qty: 0 | Refills: 0 | Status: DISCONTINUED | OUTPATIENT
Start: 2018-01-23 | End: 2018-01-29

## 2018-01-23 RX ORDER — HEPARIN SODIUM 5000 [USP'U]/ML
5000 INJECTION INTRAVENOUS; SUBCUTANEOUS EVERY 12 HOURS
Qty: 0 | Refills: 0 | Status: DISCONTINUED | OUTPATIENT
Start: 2018-01-23 | End: 2018-01-25

## 2018-01-23 RX ORDER — SODIUM POLYSTYRENE SULFONATE 4.1 MEQ/G
30 POWDER, FOR SUSPENSION ORAL ONCE
Qty: 0 | Refills: 0 | Status: COMPLETED | OUTPATIENT
Start: 2018-01-23 | End: 2018-01-23

## 2018-01-23 RX ORDER — LACTOBACILLUS ACIDOPHILUS 100MM CELL
1 CAPSULE ORAL DAILY
Qty: 0 | Refills: 0 | Status: DISCONTINUED | OUTPATIENT
Start: 2018-01-23 | End: 2018-01-29

## 2018-01-23 RX ORDER — SODIUM CHLORIDE 9 MG/ML
1000 INJECTION INTRAMUSCULAR; INTRAVENOUS; SUBCUTANEOUS ONCE
Qty: 0 | Refills: 0 | Status: COMPLETED | OUTPATIENT
Start: 2018-01-23 | End: 2018-01-23

## 2018-01-23 RX ORDER — LANOLIN ALCOHOL/MO/W.PET/CERES
3 CREAM (GRAM) TOPICAL AT BEDTIME
Qty: 0 | Refills: 0 | Status: DISCONTINUED | OUTPATIENT
Start: 2018-01-23 | End: 2018-01-29

## 2018-01-23 RX ORDER — OXYCODONE HYDROCHLORIDE 5 MG/1
5 TABLET ORAL EVERY 6 HOURS
Qty: 0 | Refills: 0 | Status: DISCONTINUED | OUTPATIENT
Start: 2018-01-23 | End: 2018-01-26

## 2018-01-23 RX ORDER — ENOXAPARIN SODIUM 100 MG/ML
100 INJECTION SUBCUTANEOUS ONCE
Qty: 0 | Refills: 0 | Status: COMPLETED | OUTPATIENT
Start: 2018-01-23 | End: 2018-01-23

## 2018-01-23 RX ORDER — ACETAMINOPHEN 500 MG
650 TABLET ORAL ONCE
Qty: 0 | Refills: 0 | Status: COMPLETED | OUTPATIENT
Start: 2018-01-23 | End: 2018-01-23

## 2018-01-23 RX ADMIN — OXYCODONE HYDROCHLORIDE 5 MILLIGRAM(S): 5 TABLET ORAL at 22:41

## 2018-01-23 RX ADMIN — SODIUM CHLORIDE 3 MILLILITER(S): 9 INJECTION INTRAMUSCULAR; INTRAVENOUS; SUBCUTANEOUS at 13:36

## 2018-01-23 RX ADMIN — CEFEPIME 100 MILLIGRAM(S): 1 INJECTION, POWDER, FOR SOLUTION INTRAMUSCULAR; INTRAVENOUS at 20:02

## 2018-01-23 RX ADMIN — SODIUM CHLORIDE 1000 MILLILITER(S): 9 INJECTION INTRAMUSCULAR; INTRAVENOUS; SUBCUTANEOUS at 13:22

## 2018-01-23 RX ADMIN — Medication 400 MILLIGRAM(S): at 13:36

## 2018-01-23 RX ADMIN — OXYCODONE HYDROCHLORIDE 5 MILLIGRAM(S): 5 TABLET ORAL at 23:11

## 2018-01-23 RX ADMIN — BUDESONIDE AND FORMOTEROL FUMARATE DIHYDRATE 2 PUFF(S): 160; 4.5 AEROSOL RESPIRATORY (INHALATION) at 22:42

## 2018-01-23 RX ADMIN — ENOXAPARIN SODIUM 100 MILLIGRAM(S): 100 INJECTION SUBCUTANEOUS at 18:20

## 2018-01-23 RX ADMIN — Medication 25 MILLILITER(S): at 23:59

## 2018-01-23 RX ADMIN — SIMVASTATIN 20 MILLIGRAM(S): 20 TABLET, FILM COATED ORAL at 22:42

## 2018-01-23 NOTE — H&P ADULT - PROBLEM SELECTOR PLAN 6
? stress related with YONATAN  repeat stat labs now - pt clinically without s/s cardiac ischemic  if trop is still rising would call cardiology overnight  monitor on telemetry

## 2018-01-23 NOTE — H&P ADULT - PROBLEM SELECTOR PLAN 8
appreciate ortho input  PWB to RLE  cont oxycodone for now but if creatinine stays elevated would switch to dilaudid

## 2018-01-23 NOTE — H&P ADULT - EXTREMITIES COMMENTS
RLE in brace and just dressed by ortho  LLE varicosities, no edema, warm and well perfused extremities

## 2018-01-23 NOTE — H&P ADULT - PROBLEM SELECTOR PLAN 1
will cont cefepime for now, doubt aspiration and given increased phlegm with lethargy/hypoxia most likely underlying lung source ? superimposed with atelectasis on CT scan  would obtain ID consult  at this point if continues to spike or wbc increases/pt clinically worsens would cover with vanco given multiple internal fomites - joint replacement, PPM, h/o MVR, PICC  f/u cultures  procalcitonin ordered  if develops further URI symptoms consider repeat RVP  SQH for DVT prophylaxis

## 2018-01-23 NOTE — CONSULT NOTE ADULT - SUBJECTIVE AND OBJECTIVE BOX
Orthopaedic Surgery Consult Note    Patient is a 77y old  Female who presents 4 weeks s/p R TKA explant with placement of antibiotic spacer. Patient currently at [facility] for IV ceftriaxone and rehab. Recently seen in ED for severe epistaxis and was taken off plavix. Patient doing well since until yesterday when found to have hypoxemia, fever, elevated WBC and hyperkalemia. Patient reports knee feels well. Patient seen by Dr. Bass in office one week ago. Sutures removed at visit and distal aspect of wound noted to have small central eschar formation, but otherwise no erythema/drainage and advised to do BID dressing changes with bacitracin. Patient's aide reports compliance with wound care. Patient reports knee feels fine and has been progressing at rehab. Patient has been taking  BID for DVT prophylaxis since stopping plavix. No other bone/joint complaints.      PAST MEDICAL & SURGICAL HISTORY:  CAD (coronary artery disease): HERBERT to LAD 2016  Aortic stenosis, severe  Uncomplicated asthma, unspecified asthma severity  Polymyalgia  Lumbar disc disease with radiculopathy  Spider veins  Anxiety  Severe aortic stenosis by prior echocardiogram:  and  2016  Dysphagia  Morbid obesity with BMI of 50.0-59.9, adult  Macular degeneration  Hiatal hernia  GERD (gastroesophageal reflux disease)  Sciatica  Osteoarthritis  H/O cardiac catheterization: 16 HERBERT placed on LAD  H/O endoscopy: with dilatation of esophageal stricture   S/P cataract surgery: x2; 3-4yr ago  S/P gastroplasty: 28 yrs ago  S/P cholecystectomy: more than 15 years ago  S/P total knee replacement: left, 15yr ago  S/P total knee replacement: right, 12yr ago  S/P hysterectomy: 24yr ago    [] No significant past history as reviewed with the patient and family    MEDICATIONS  (STANDING):  cefepime  IVPB 1000 milliGRAM(s) IV Intermittent every 12 hours    MEDICATIONS  (PRN):    Allergies    No Known Allergies    Intolerances    IV Contrast (Flushing (Skin); Nausea)      Vital Signs Last 24 Hrs  T(C): 36.7 (2018 19:40), Max: 37.9 (2018 12:49)  T(F): 98 (2018 19:40), Max: 100.3 (2018 12:49)  HR: 69 (2018 19:40) (67 - 79)  BP: 101/65 (2018 19:40) (97/58 - 114/48)  BP(mean): --  RR: 18 (2018 19:40) (16 - 18)  SpO2: 97% (2018 19:40) (97% - 98%)      PHYSICAL EXAM:  NAD  RLE:  +Knee immobilizer  Steri-strips intact over proximal incision, c/d/i  Distal third of incision n/f 2cm area of central eschar formation, no erythema, no purulent drainage expressed  NTTP about knee, mild pain on PROM  motor intact gs/ta/ehl/fhl  SILT s/s/sp/dp/t  wwp                            8.0    13.6  )-----------( 204      ( 2018 12:50 )             24.2         138  |  103  |  26<H>  ----------------------------<  119<H>  5.1   |  21<L>  |  1.97<H>    Ca    8.8      2018 12:50    TPro  6.3  /  Alb  2.7<L>  /  TBili  0.3  /  DBili  x   /  AST  16  /  ALT  8<L>  /  AlkPhos  72      PT/INR - ( 2018 12:50 )   PT: 14.8 sec;   INR: 1.36 ratio         PTT - ( 2018 12:50 )  PTT:32.7 sec  Urinalysis Basic - ( 2018 13:21 )    Color: Yellow / Appearance: SL Turbid / S.019 / pH: x  Gluc: x / Ketone: Negative  / Bili: Negative / Urobili: Negative   Blood: x / Protein: 30 mg/dL / Nitrite: Negative   Leuk Esterase: Negative / RBC: 2-5 /HPF / WBC 5-10 /HPF   Sq Epi: x / Non Sq Epi: Occasional /HPF / Bacteria: x        IMAGING STUDIES:  < from: CT Chest No Cont (18 @ 17:45) >  IMPRESSION: Small bilateral pleural effusions with associated patchy   lower lobe opacities most likely represents atelectasis.    < end of copied text >    77y Female with b/l PE s/p R TKA explant/antibiotic spacer  - Pain control  - Therapeutic AC per primary team  - PT/OT/OOB  - PWB RLE in KI   - Wound care with BID collagenase to distal aspect of knee incision  - No acute orthopedic intervention low clinical concern for infected spacer  - d/w attending Dr. Bass Orthopaedic Surgery Consult Note    Patient is a 77y old  Female who presents 4 weeks s/p R TKA explant with placement of antibiotic spacer. Patient currently at [facility] for IV ceftriaxone and rehab. Recently seen in ED for severe epistaxis and was taken off plavix. Patient doing well since until yesterday when found to have hypoxemia, fever, elevated WBC and hyperkalemia. Patient reports knee feels well. Patient seen by Dr. Bass in office one week ago. Sutures removed at visit and distal aspect of wound noted to have small central eschar formation, but otherwise no erythema/drainage and advised to do BID dressing changes with bacitracin. Patient's aide reports compliance with wound care. Patient reports knee feels fine and has been progressing at rehab. Patient has been taking  BID for DVT prophylaxis since stopping plavix. No other bone/joint complaints.      PAST MEDICAL & SURGICAL HISTORY:  CAD (coronary artery disease): HERBERT to LAD 2016  Aortic stenosis, severe  Uncomplicated asthma, unspecified asthma severity  Polymyalgia  Lumbar disc disease with radiculopathy  Spider veins  Anxiety  Severe aortic stenosis by prior echocardiogram:  and  2016  Dysphagia  Morbid obesity with BMI of 50.0-59.9, adult  Macular degeneration  Hiatal hernia  GERD (gastroesophageal reflux disease)  Sciatica  Osteoarthritis  H/O cardiac catheterization: 16 HERBERT placed on LAD  H/O endoscopy: with dilatation of esophageal stricture   S/P cataract surgery: x2; 3-4yr ago  S/P gastroplasty: 28 yrs ago  S/P cholecystectomy: more than 15 years ago  S/P total knee replacement: left, 15yr ago  S/P total knee replacement: right, 12yr ago  S/P hysterectomy: 24yr ago    [] No significant past history as reviewed with the patient and family    MEDICATIONS  (STANDING):  cefepime  IVPB 1000 milliGRAM(s) IV Intermittent every 12 hours    MEDICATIONS  (PRN):    Allergies    No Known Allergies    Intolerances    IV Contrast (Flushing (Skin); Nausea)      Vital Signs Last 24 Hrs  T(C): 36.7 (2018 19:40), Max: 37.9 (2018 12:49)  T(F): 98 (2018 19:40), Max: 100.3 (2018 12:49)  HR: 69 (2018 19:40) (67 - 79)  BP: 101/65 (2018 19:40) (97/58 - 114/48)  BP(mean): --  RR: 18 (2018 19:40) (16 - 18)  SpO2: 97% (2018 19:40) (97% - 98%)      PHYSICAL EXAM:  NAD  RLE:  +Knee immobilizer  Steri-strips intact over proximal incision, c/d/i  Distal third of incision n/f 2cm area of central eschar formation, no erythema, no purulent drainage expressed  NTTP about knee, mild pain on PROM  motor intact gs/ta/ehl/fhl  SILT s/s/sp/dp/t  wwp                            8.0    13.6  )-----------( 204      ( 2018 12:50 )             24.2         138  |  103  |  26<H>  ----------------------------<  119<H>  5.1   |  21<L>  |  1.97<H>    Ca    8.8      2018 12:50    TPro  6.3  /  Alb  2.7<L>  /  TBili  0.3  /  DBili  x   /  AST  16  /  ALT  8<L>  /  AlkPhos  72      PT/INR - ( 2018 12:50 )   PT: 14.8 sec;   INR: 1.36 ratio         PTT - ( 2018 12:50 )  PTT:32.7 sec  Urinalysis Basic - ( 2018 13:21 )    Color: Yellow / Appearance: SL Turbid / S.019 / pH: x  Gluc: x / Ketone: Negative  / Bili: Negative / Urobili: Negative   Blood: x / Protein: 30 mg/dL / Nitrite: Negative   Leuk Esterase: Negative / RBC: 2-5 /HPF / WBC 5-10 /HPF   Sq Epi: x / Non Sq Epi: Occasional /HPF / Bacteria: x        IMAGING STUDIES:    < from: NM Pulmonary Ventilation/Perfusion Scan (18 @ 15:56) >  FINDINGS: There is a matched ventilation/perfusion defect involving the   posterior basal segment of the left lower lobe without corresponding   opacity on chest x-ray. There is heterogeneous tracer distribution in the   remainder of the lungs on both the ventilation and the perfusion images.   There are no mismatched defects.    IMPRESSION: Abnormal ventilation/perfusion lung scan.    < from: CT Chest No Cont (18 @ 17:45) >  IMPRESSION: Small bilateral pleural effusions with associated patchy   lower lobe opacities most likely represents atelectasis.    < end of copied text >    77y Female with hypoxemia s/p R TKA explant/antibiotic spacer  - Pain control  - DVT ppx  - PT/OT/OOB  - PWB RLE in KI   - Wound care with BID collagenase to distal aspect of knee incision  - No acute orthopedic intervention, low clinical concern for infected spacer  - d/w attending Dr. Bass

## 2018-01-23 NOTE — ED PROVIDER NOTE - OBJECTIVE STATEMENT
78 y/o F pt with PMHx of anxiety, CAD, dysphagia, GERD, macular degeneration, osteoarthritis, polymyalgia, sciatica, PSHx of bilateral knee replacement, mitral valve replacement, pacemaker, cardiac stent sent in from nursing home for elevated WBC, potassium and decrease in SAT O2. EMS found pt at 91% with 2L. Increased to 96% with 4L. Notes fevers this morning and chills last night. Pt had her knee replacement removed 1 month ago because it got infected. Pt hasn't gotten it replaced yet. Pt hasn't walked since the surgery. Currently resides in a rehab center s/p surgery. Pt is currently taking ceftriaxone and has wound care. Recently stopped taking Plavix because of a recent nose bleed. Denies cough, SOB or any other complaints.  Internal Medicine: Dr. Aki Anderson  Orthopedic surgeon: Dr. Bass  Rehab Physician: Dr. Tran

## 2018-01-23 NOTE — H&P ADULT - NSHPSOCIALHISTORY_GEN_ALL_CORE
prior to knee surgery lived with spouse I apartment prior to knee surgery lived with spouse in apartment, had aide but mostly for  prior to hospitalization  no smoking/alcohol/illicit drug use

## 2018-01-23 NOTE — ED ADULT NURSE NOTE - OBJECTIVE STATEMENT
76 yo F with pmhx of R TKR 1 month ago, was d/c from the hospital and has been at [facility] facility x 2 weeks BIBA today for fever and low O2 sats. Pt a&ox3, lungs cta, abd soft nt/nd. Febrile to 100.7, vital signs otherwise stable. C/o dull pain in left chest. Pt was receiving ceftriaxone at the rehab for infection of R knee prosthesis. Skin otherwise intact. Pt in NAD.

## 2018-01-23 NOTE — PATIENT PROFILE ADULT. - VISION (WITH CORRECTIVE LENSES IF THE PATIENT USUALLY WEARS THEM):
Wears reading glasses/Normal vision: sees adequately in most situations; can see medication labels, newsprint

## 2018-01-23 NOTE — H&P ADULT - NSHPLABSRESULTS_GEN_ALL_CORE
labs/studies/ekg reviewed by me  ekg with significant artifact but no ischemic changes  wbc 13.6, hb 8, plt 204, inr 1.36  k 5.1, bun 26, creat 1.97 from 1.52  lact 0.8  ck 25  trop 0.07  UA neg  v/q low prob  rvp neg

## 2018-01-23 NOTE — ED PROVIDER NOTE - MEDICAL DECISION MAKING DETAILS
78 y/o F pt with PMHx of CAD presents to the ED for low O2 sat and elevated WBC. Pt possibly with new pneumonia or PE. Will r/o with CT angio of chest. Likely of admission for sepsis work up.

## 2018-01-23 NOTE — H&P ADULT - HISTORY OF PRESENT ILLNESS
77 f with extensive PMH including CAD s/p HERBERT, severe AS, h/o MVR, PMR on chronic steroids, asthma, OA, s/p PPM, s/p TKR with recent joint infection s/p explant and abx spacer now in rehab for 3 wks on CTX now admitted with fever and hypoxia.  Per aide assisting patient in history - pt was doing well over weekend, had multiple visitors on Sunday and Sunday night had episode profuse nausea with vomiting of mucous though no cough or nasal congestion - improved with zofran ordered at rehab.  Monday morning woke and was doing well until returning from PT in afternoon when patient was very lethargic with chills, went to bed.  Ate minimal food for dinner and slept through the night.  This am pt was still lethargic and reportedly was febrile with hypoxia and was transported to [facility].  EMS report notes sat 91 on 2L NC to 96 on 4L NC.  No cp/palps.  No urinary symptoms, having daily soft BMs no diarrhea.  + wound care as ordered with no issues.  Pt c/o mild pain left thorax/flank increased with movement.    In ED low bp 97/58, T max 37.9  Given cefepime and lovenox full dose.  V/Q neg for PE and CTchest patchy lower lobe opacities c/w atelectasis

## 2018-01-24 DIAGNOSIS — R09.02 HYPOXEMIA: ICD-10-CM

## 2018-01-24 DIAGNOSIS — R50.9 FEVER, UNSPECIFIED: ICD-10-CM

## 2018-01-24 DIAGNOSIS — G93.41 METABOLIC ENCEPHALOPATHY: ICD-10-CM

## 2018-01-24 LAB
ALBUMIN SERPL ELPH-MCNC: 2.9 G/DL — LOW (ref 3.3–5)
ALP SERPL-CCNC: 77 U/L — SIGNIFICANT CHANGE UP (ref 40–120)
ALT FLD-CCNC: 5 U/L RC — LOW (ref 10–45)
ANION GAP SERPL CALC-SCNC: 10 MMOL/L — SIGNIFICANT CHANGE UP (ref 5–17)
AST SERPL-CCNC: 14 U/L — SIGNIFICANT CHANGE UP (ref 10–40)
BASOPHILS # BLD AUTO: 0.03 K/UL — SIGNIFICANT CHANGE UP (ref 0–0.2)
BASOPHILS NFR BLD AUTO: 0.3 % — SIGNIFICANT CHANGE UP (ref 0–2)
BILIRUB SERPL-MCNC: 0.3 MG/DL — SIGNIFICANT CHANGE UP (ref 0.2–1.2)
BUN SERPL-MCNC: 27 MG/DL — HIGH (ref 7–23)
BUN SERPL-MCNC: 28 MG/DL — HIGH (ref 7–23)
BUN SERPL-MCNC: 29 MG/DL — HIGH (ref 7–23)
CALCIUM SERPL-MCNC: 8.5 MG/DL — SIGNIFICANT CHANGE UP (ref 8.4–10.5)
CALCIUM SERPL-MCNC: 8.5 MG/DL — SIGNIFICANT CHANGE UP (ref 8.4–10.5)
CALCIUM SERPL-MCNC: 8.7 MG/DL — SIGNIFICANT CHANGE UP (ref 8.4–10.5)
CHLORIDE SERPL-SCNC: 101 MMOL/L — SIGNIFICANT CHANGE UP (ref 96–108)
CHLORIDE SERPL-SCNC: 105 MMOL/L — SIGNIFICANT CHANGE UP (ref 96–108)
CHLORIDE SERPL-SCNC: 106 MMOL/L — SIGNIFICANT CHANGE UP (ref 96–108)
CK MB CFR SERPL CALC: 1.2 NG/ML — SIGNIFICANT CHANGE UP (ref 0–3.8)
CK SERPL-CCNC: 21 U/L — LOW (ref 25–170)
CO2 SERPL-SCNC: 25 MMOL/L — SIGNIFICANT CHANGE UP (ref 22–31)
CO2 SERPL-SCNC: 26 MMOL/L — SIGNIFICANT CHANGE UP (ref 22–31)
CO2 SERPL-SCNC: 26 MMOL/L — SIGNIFICANT CHANGE UP (ref 22–31)
CREAT SERPL-MCNC: 1.41 MG/DL — HIGH (ref 0.5–1.3)
CREAT SERPL-MCNC: 1.49 MG/DL — HIGH (ref 0.5–1.3)
CREAT SERPL-MCNC: 1.69 MG/DL — HIGH (ref 0.5–1.3)
CULTURE RESULTS: NO GROWTH — SIGNIFICANT CHANGE UP
EOSINOPHIL # BLD AUTO: 0.19 K/UL — SIGNIFICANT CHANGE UP (ref 0–0.5)
EOSINOPHIL NFR BLD AUTO: 1.8 % — SIGNIFICANT CHANGE UP (ref 0–6)
GLUCOSE BLDC GLUCOMTR-MCNC: 110 MG/DL — HIGH (ref 70–99)
GLUCOSE SERPL-MCNC: 124 MG/DL — HIGH (ref 70–99)
GLUCOSE SERPL-MCNC: 93 MG/DL — SIGNIFICANT CHANGE UP (ref 70–99)
GLUCOSE SERPL-MCNC: 99 MG/DL — SIGNIFICANT CHANGE UP (ref 70–99)
HCT VFR BLD CALC: 24.5 % — LOW (ref 34.5–45)
HGB BLD-MCNC: 7.5 G/DL — LOW (ref 11.5–15.5)
IMM GRANULOCYTES NFR BLD AUTO: 0.3 % — SIGNIFICANT CHANGE UP (ref 0–1.5)
LYMPHOCYTES # BLD AUTO: 1.52 K/UL — SIGNIFICANT CHANGE UP (ref 1–3.3)
LYMPHOCYTES # BLD AUTO: 14.2 % — SIGNIFICANT CHANGE UP (ref 13–44)
MAGNESIUM SERPL-MCNC: 2 MG/DL — SIGNIFICANT CHANGE UP (ref 1.6–2.6)
MCHC RBC-ENTMCNC: 28.1 PG — SIGNIFICANT CHANGE UP (ref 27–34)
MCHC RBC-ENTMCNC: 30.6 GM/DL — LOW (ref 32–36)
MCV RBC AUTO: 91.8 FL — SIGNIFICANT CHANGE UP (ref 80–100)
MONOCYTES # BLD AUTO: 1 K/UL — HIGH (ref 0–0.9)
MONOCYTES NFR BLD AUTO: 9.3 % — SIGNIFICANT CHANGE UP (ref 2–14)
NEUTROPHILS # BLD AUTO: 7.96 K/UL — HIGH (ref 1.8–7.4)
NEUTROPHILS NFR BLD AUTO: 74.1 % — SIGNIFICANT CHANGE UP (ref 43–77)
PLATELET # BLD AUTO: 202 K/UL — SIGNIFICANT CHANGE UP (ref 150–400)
POTASSIUM SERPL-MCNC: 5.3 MMOL/L — SIGNIFICANT CHANGE UP (ref 3.5–5.3)
POTASSIUM SERPL-MCNC: 5.5 MMOL/L — HIGH (ref 3.5–5.3)
POTASSIUM SERPL-MCNC: 6 MMOL/L — HIGH (ref 3.5–5.3)
POTASSIUM SERPL-SCNC: 5.3 MMOL/L — SIGNIFICANT CHANGE UP (ref 3.5–5.3)
POTASSIUM SERPL-SCNC: 5.5 MMOL/L — HIGH (ref 3.5–5.3)
POTASSIUM SERPL-SCNC: 6 MMOL/L — HIGH (ref 3.5–5.3)
PROCALCITONIN SERPL-MCNC: 0.2 NG/ML — HIGH (ref 0–0.04)
PROT SERPL-MCNC: 6.5 G/DL — SIGNIFICANT CHANGE UP (ref 6–8.3)
RBC # BLD: 2.67 M/UL — LOW (ref 3.8–5.2)
RBC # FLD: 18.1 % — HIGH (ref 10.3–14.5)
SODIUM SERPL-SCNC: 137 MMOL/L — SIGNIFICANT CHANGE UP (ref 135–145)
SODIUM SERPL-SCNC: 140 MMOL/L — SIGNIFICANT CHANGE UP (ref 135–145)
SODIUM SERPL-SCNC: 142 MMOL/L — SIGNIFICANT CHANGE UP (ref 135–145)
SPECIMEN SOURCE: SIGNIFICANT CHANGE UP
TROPONIN T SERPL-MCNC: 0.04 NG/ML — SIGNIFICANT CHANGE UP (ref 0–0.06)
WBC # BLD: 10.73 K/UL — HIGH (ref 3.8–10.5)
WBC # FLD AUTO: 10.73 K/UL — HIGH (ref 3.8–10.5)

## 2018-01-24 PROCEDURE — 93010 ELECTROCARDIOGRAM REPORT: CPT

## 2018-01-24 PROCEDURE — 99223 1ST HOSP IP/OBS HIGH 75: CPT

## 2018-01-24 RX ORDER — COLLAGENASE CLOSTRIDIUM HIST. 250 UNIT/G
1 OINTMENT (GRAM) TOPICAL
Qty: 0 | Refills: 0 | Status: DISCONTINUED | OUTPATIENT
Start: 2018-01-24 | End: 2018-01-29

## 2018-01-24 RX ORDER — SODIUM CHLORIDE 0.65 %
1 AEROSOL, SPRAY (ML) NASAL THREE TIMES A DAY
Qty: 0 | Refills: 0 | Status: DISCONTINUED | OUTPATIENT
Start: 2018-01-24 | End: 2018-01-29

## 2018-01-24 RX ORDER — IPRATROPIUM/ALBUTEROL SULFATE 18-103MCG
3 AEROSOL WITH ADAPTER (GRAM) INHALATION ONCE
Qty: 0 | Refills: 0 | Status: COMPLETED | OUTPATIENT
Start: 2018-01-24 | End: 2018-01-24

## 2018-01-24 RX ADMIN — Medication 325 MILLIGRAM(S): at 12:52

## 2018-01-24 RX ADMIN — Medication 3 MILLILITER(S): at 08:58

## 2018-01-24 RX ADMIN — OXYCODONE HYDROCHLORIDE 5 MILLIGRAM(S): 5 TABLET ORAL at 09:00

## 2018-01-24 RX ADMIN — CEFEPIME 100 MILLIGRAM(S): 1 INJECTION, POWDER, FOR SOLUTION INTRAMUSCULAR; INTRAVENOUS at 08:58

## 2018-01-24 RX ADMIN — Medication 1 TABLET(S): at 12:53

## 2018-01-24 RX ADMIN — Medication 1 SPRAY(S): at 17:10

## 2018-01-24 RX ADMIN — Medication 81 MILLIGRAM(S): at 12:52

## 2018-01-24 RX ADMIN — OXYCODONE HYDROCHLORIDE 5 MILLIGRAM(S): 5 TABLET ORAL at 21:32

## 2018-01-24 RX ADMIN — Medication 1 SPRAY(S): at 21:32

## 2018-01-24 RX ADMIN — INSULIN HUMAN 10 UNIT(S): 100 INJECTION, SOLUTION SUBCUTANEOUS at 00:12

## 2018-01-24 RX ADMIN — Medication 2.5 MILLIGRAM(S): at 06:00

## 2018-01-24 RX ADMIN — PANTOPRAZOLE SODIUM 40 MILLIGRAM(S): 20 TABLET, DELAYED RELEASE ORAL at 08:59

## 2018-01-24 RX ADMIN — Medication 3 MILLIGRAM(S): at 22:55

## 2018-01-24 RX ADMIN — OXYCODONE HYDROCHLORIDE 5 MILLIGRAM(S): 5 TABLET ORAL at 22:15

## 2018-01-24 RX ADMIN — Medication 1 APPLICATION(S): at 06:00

## 2018-01-24 RX ADMIN — OXYCODONE HYDROCHLORIDE 5 MILLIGRAM(S): 5 TABLET ORAL at 10:39

## 2018-01-24 RX ADMIN — BUDESONIDE AND FORMOTEROL FUMARATE DIHYDRATE 2 PUFF(S): 160; 4.5 AEROSOL RESPIRATORY (INHALATION) at 06:00

## 2018-01-24 RX ADMIN — Medication 1 TABLET(S): at 13:37

## 2018-01-24 RX ADMIN — BUDESONIDE AND FORMOTEROL FUMARATE DIHYDRATE 2 PUFF(S): 160; 4.5 AEROSOL RESPIRATORY (INHALATION) at 17:11

## 2018-01-24 RX ADMIN — SODIUM POLYSTYRENE SULFONATE 30 GRAM(S): 4.1 POWDER, FOR SUSPENSION ORAL at 00:00

## 2018-01-24 RX ADMIN — HEPARIN SODIUM 5000 UNIT(S): 5000 INJECTION INTRAVENOUS; SUBCUTANEOUS at 06:00

## 2018-01-24 RX ADMIN — HEPARIN SODIUM 5000 UNIT(S): 5000 INJECTION INTRAVENOUS; SUBCUTANEOUS at 17:12

## 2018-01-24 RX ADMIN — Medication 650 MILLIGRAM(S): at 12:53

## 2018-01-24 RX ADMIN — Medication 650 MILLIGRAM(S): at 14:08

## 2018-01-24 RX ADMIN — CEFEPIME 100 MILLIGRAM(S): 1 INJECTION, POWDER, FOR SOLUTION INTRAMUSCULAR; INTRAVENOUS at 20:07

## 2018-01-24 RX ADMIN — SIMVASTATIN 20 MILLIGRAM(S): 20 TABLET, FILM COATED ORAL at 21:32

## 2018-01-24 RX ADMIN — Medication 1 APPLICATION(S): at 17:12

## 2018-01-24 NOTE — DIETITIAN INITIAL EVALUATION ADULT. - NS AS NUTRI INTERV MEALS SNACK
Recommend DASH/TLC diet as medically feasible. Discussed with NP. Recommend DASH/TLC, no concentrated potassium, no concentrated phosphorus diet as medically feasible. Discussed with NP.

## 2018-01-24 NOTE — PROGRESS NOTE ADULT - SUBJECTIVE AND OBJECTIVE BOX
Patient seen and examined. Pain controlled. No acute events overnight. reporting some diarrhea.       IV Contrast (Flushing (Skin); Nausea)                          8.0    13.6  )-----------( 204      ( 23 Jan 2018 12:50 )             24.2     23 Jan 2018 22:33    139    |  104    |  30     ----------------------------<  106    5.9     |  22     |  1.90     Ca    8.8        23 Jan 2018 22:33  Phos  5.1       23 Jan 2018 22:33  Mg     2.1       23 Jan 2018 22:33    TPro  6.3    /  Alb  2.7    /  TBili  0.3    /  DBili  x      /  AST  16     /  ALT  8      /  AlkPhos  72     23 Jan 2018 12:50    PT/INR - ( 23 Jan 2018 12:50 )   PT: 14.8 sec;   INR: 1.36 ratio         PTT - ( 23 Jan 2018 12:50 )  PTT:32.7 sec  Vital Signs Last 24 Hrs  T(C): 37.2 (01-24-18 @ 05:21), Max: 37.9 (01-23-18 @ 12:49)  T(F): 99 (01-24-18 @ 05:21), Max: 100.3 (01-23-18 @ 12:49)  HR: 85 (01-24-18 @ 04:30) (67 - 85)  BP: 137/67 (01-24-18 @ 04:30) (97/58 - 137/67)  BP(mean): 77 (01-23-18 @ 21:09) (77 - 77)  RR: 18 (01-24-18 @ 04:30) (16 - 18)  SpO2: 99% (01-24-18 @ 04:30) (97% - 99%)    Physical Exam  Gen: NAD  R LE:   Dressing c/d/i, KI present  +ehl/fhl/ta/gs function  L2-S1 silt  Dp/pt pulse intact  No calf ttp  Compartments soft

## 2018-01-24 NOTE — DIETITIAN INITIAL EVALUATION ADULT. - ORAL INTAKE PTA
fair/Usual intake PTA: Breakfast: oatmeal or rice krispies  with coffee; Lunch: cottage cheese and pineapple, yogurt or a grilled cheese; Dinner: mostly Calvin salad with grilled chicken but pt states that she rarely consumes any kind of meat/poultry. Snacks on popcorn and pirate booty snacks. Pt states that she does not take any vitamins or supplements PTA.

## 2018-01-24 NOTE — CONSULT NOTE ADULT - SUBJECTIVE AND OBJECTIVE BOX
- Division of Pulmonary, Critical Care and Sleep Medicine   Please call [phone] between 8am-pm weekdays, [phone] after hours and weekends      CHIEF COMPLAINT: Fever    HPI: 78 yo F with a h/o AS s/p TAVR and PPM 1 year ago, obesity, CAD, PMR on chronic prednisone at 2.5 mg, remote h/o b/l TKR, recent admission to [facility] in Dec for strept sanguinous bacteremia with knee seeding, s/o right knee explant on . Discharged to SNF on 18. Brought to ED on  for low grade fever and found to be hypoxemic to 91%, improved with supplemental O2.  CT chest non-con: b/l very small pleural effusions with adjacent atelectasis.   V/Q: low probability for PE  WBC 14.6 and YONATAN  Antibiotics switched to Cefepime   Inhalers: Symbicort and Duonebs    PAST MEDICAL & SURGICAL HISTORY:  CAD (coronary artery disease): HERBERT to LAD 2016  Aortic stenosis, severe  Uncomplicated asthma, unspecified asthma severity  Polymyalgia  Lumbar disc disease with radiculopathy  Spider veins  Anxiety  Severe aortic stenosis by prior echocardiogram:  and  2016  Dysphagia  Morbid obesity with BMI of 50.0-59.9, adult  Macular degeneration  Hiatal hernia  GERD (gastroesophageal reflux disease)  Sciatica  Osteoarthritis  H/O cardiac catheterization: 16 HERBERT placed on LAD  H/O endoscopy: with dilatation of esophageal stricture   S/P cataract surgery: x2; 3-4yr ago  S/P gastroplasty: 28 yrs ago  S/P cholecystectomy: more than 15 years ago  S/P total knee replacement: left, 15yr ago  S/P total knee replacement: right, 12yr ago  S/P hysterectomy: 24yr ago      FAMILY HISTORY:  No pertinent family history in first degree relatives      SOCIAL HISTORY:  Smoking: [ ] Never Smoked [ ] Former Smoker (__ packs x ___ years) [ ] Current Smoker  (__ packs x ___ years)  Substance Use: [ ] Never Used [ ] Used ____  EtOH Use:  Marital Status: [ ] Single [ ]  [ ]  [ ]   Occupation:  Recent Travel:  Country of Birth:  Advance Directives:    Allergies    No Known Allergies    Intolerances    IV Contrast (Flushing (Skin); Nausea)      HOME MEDICATIONS:    REVIEW OF SYSTEMS:  Constitutional: [ ] fevers [ ] chills [ ] weight loss [ ] weight gain  HEENT: [ ] dry eyes [ ] eye irritation [ ] postnasal drip [ ] nasal congestion  CV: [ ] chest pain [ ] orthopnea [ ] palpitations [ ] murmur  Resp: [ ] cough [ ] shortness of breath [ ] wheezing [ ] sputum [ ] hemoptysis  GI: [ ] nausea [ ] vomiting [ ] diarrhea [ ] constipation [ ] abd pain [ ] dysphagia   : [ ] dysuria [ ] nocturia [ ] hematuria [ ] increased urinary frequency  Musculoskeletal: [ ] myalgias [ ] arthralgias   Skin: [ ] rash [ ] itch  Neurological: [ ] headache [ ] dizziness [ ] syncope [ ] weakness [ ] numbness  Psychiatric: [ ] anxiety [ ] depression  Endocrine: [ ] diabetes [ ] thyroid problem  Hematologic/Lymphatic: [ ] anemia [ ] bleeding problem  Allergic/Immunologic: [ ] itchy eyes [ ] nasal discharge [ ] hives [ ] angioedema  [ ] All other systems negative  [ ] Unable to assess ROS because ________    OBJECTIVE:  ICU Vital Signs Last 24 Hrs  T(C): 37.5 (2018 11:52), Max: 37.5 (2018 04:30)  T(F): 99.5 (2018 11:52), Max: 99.5 (2018 04:30)  HR: 79 (2018 11:52) (67 - 85)  BP: 111/68 (2018 11:52) (97/58 - 137/67)  BP(mean): 77 (2018 21:09) (77 - 77)  ABP: --  ABP(mean): --  RR: 18 (2018 11:52) (18 - 18)  SpO2: 96% (2018 11:52) (96% - 99%)        CAPILLARY BLOOD GLUCOSE      POCT Blood Glucose.: 110 mg/dL (2018 00:55)      PHYSICAL EXAM:  General:  NAD  HEENT:  NC/AT  Lymph Nodes: No cervical or supraclavicular lymphadenopathy   Neck: Supple  Respiratory:  CTA B/L, no wheezes, crackles or rhonchi  Cardiovascular:  RRR, no m/r/g  Abdomen: soft, NT/ND, +BS  Extremities: no clubbing, cyanosis or edema, warm  Skin: no rash  Neurological: AAOx3, no focal deficits  Psychiatry: not anxious, normal affect    HOSPITAL MEDICATIONS:  MEDICATIONS  (STANDING):  aspirin enteric coated 81 milliGRAM(s) Oral daily  buDESOnide 160 MICROgram(s)/formoterol 4.5 MICROgram(s) Inhaler 2 Puff(s) Inhalation two times a day  cefepime  IVPB 1000 milliGRAM(s) IV Intermittent every 12 hours  collagenase Ointment 1 Application(s) Topical two times a day  ferrous    sulfate 325 milliGRAM(s) Oral daily  heparin  Injectable 5000 Unit(s) SubCutaneous every 12 hours  lactobacillus acidophilus 1 Tablet(s) Oral daily  multivitamin 1 Tablet(s) Oral daily  pantoprazole    Tablet 40 milliGRAM(s) Oral before breakfast  predniSONE   Tablet 2.5 milliGRAM(s) Oral daily  simvastatin 20 milliGRAM(s) Oral at bedtime  sodium chloride 0.65% Nasal 1 Spray(s) Both Nostrils three times a day    MEDICATIONS  (PRN):  acetaminophen   Tablet. 650 milliGRAM(s) Oral every 6 hours PRN Mild Pain (1 - 3)  melatonin 3 milliGRAM(s) Oral at bedtime PRN Insomnia  oxyCODONE    IR 5 milliGRAM(s) Oral every 6 hours PRN Moderate Pain (4 - 6)      LABS:                        7.5    10.73 )-----------( 202      ( 2018 07:29 )             24.5     Hgb Trend: 7.5<--, 8.0<--, 8.1<--, 7.6<--, 6.7<--  0124    142  |  106  |  27<H>  ----------------------------<  99  6.0<H>   |  26  |  1.41<H>    Ca    8.5      2018 10:24  Phos  5.1       Mg     2.1         TPro  6.5  /  Alb  2.9<L>  /  TBili  0.3  /  DBili  x   /  AST  14  /  ALT  5<L>  /  AlkPhos  77      Creatinine Trend: 1.41<--, 1.69<--, 1.90<--, 1.97<--, 1.52<--, 1.11<--  PT/INR - ( 2018 12:50 )   PT: 14.8 sec;   INR: 1.36 ratio         PTT - ( 2018 12:50 )  PTT:32.7 sec  Urinalysis Basic - ( 2018 13:21 )    Color: Yellow / Appearance: SL Turbid / S.019 / pH: x  Gluc: x / Ketone: Negative  / Bili: Negative / Urobili: Negative   Blood: x / Protein: 30 mg/dL / Nitrite: Negative   Leuk Esterase: Negative / RBC: 2-5 /HPF / WBC 5-10 /HPF   Sq Epi: x / Non Sq Epi: Occasional /HPF / Bacteria: x        Venous Blood Gas:   @ 16:29  7.33/52/42//71  VBG Lactate: 0.8      MICROBIOLOGY: Culture - Blood (18 @ 14:49)    Specimen Source: .Blood Blood-Peripheral    Culture Results:   No growth to date.        RADIOLOGY:  [x ] Reviewed and interpreted by me  < from: CT Chest No Cont (18 @ 17:45) >  EXAM:  CT CHEST                            PROCEDURE DATE:  2018            INTERPRETATION:  CLINICAL INFORMATION: Shortness of breath.    COMPARISON: CT Chest on 2017.    PROCEDURE:   CT of the Chest was performed without intravenous contrast.  Sagittal and coronal reformats were performed.      FINDINGS:    CHEST:     LUNGS AND LARGE AIRWAYS: Patent central airways. Patchy bilateral lower   lobe opacities. 2 mm calcified granuloma in the left upper lobe.  PLEURA: Small bilateral pleural effusions.  VESSELS: Right PICC line with tip in SVC. Atherosclerotic changes of the   thoracic aorta. No aneurysm.   HEART: Heart size is normal. No pericardial effusion. Status post TAVR.   Coronary artery calcification. Left chest wall pacemaker.  MEDIASTINUM AND PEGGY: No lymphadenopathy.  CHEST WALL AND LOWER NECK: Within normal limits.  VISUALIZED UPPER ABDOMEN: Status post cholecystectomy. Status post   gastric sleeve. Partially imaged left renal cyst, indeterminate.  BONES: Multileveldegenerative spinal changes.    IMPRESSION: Small bilateral pleural effusions with associated patchy   lower lobe opacities most likely represents atelectasis.    < end of copied text >    < from: NM Pulmonary Ventilation/Perfusion Scan (18 @ 15:56) >  EXAM:  NM PULM VENTILATION PERFUS IMG                                PROCEDURE DATE:  2018          INTERPRETATION:  RADIOPHARMACEUTICAL: 1 mCi Tc-99m-DTPA aerosol by   inhalation; 6 mCi Tc-99m-MAA, I.V.    CLINICAL STATEMENT: 77-year-old female with shortness of breath.    TECHNIQUE:  Ventilation and perfusion images of the lungs were obtained   following administration of Tc-99m-DTPA and Tc-99m-MAA. Images were   obtained in the anterior, posterior, both lateral, and all 4 oblique   projections. The study was interpreted in conjunction with chest   radiograph of 2018.    FINDINGS: There is a matched ventilation/perfusion defect involving the   posterior basal segment of the left lower lobe without corresponding   opacity on chest x-ray. There is heterogeneous tracer distribution in the   remainder of the lungs on both the ventilation and the perfusion images.   There are no mismatched defects.    IMPRESSION: Abnormal ventilation/perfusion lung scan.    Low probability of pulmonary embolus.    < end of copied text >      PULMONARY FUNCTION TESTS:    EKG: Sinus rhythm at 73 bpm [facility] - Division of Pulmonary, Critical Care and Sleep Medicine   Please call [phone] between 8am-pm weekdays, [phone] after hours and weekends      CHIEF COMPLAINT: Fever, hypoxemia    HPI: 76 yo F with a h/o AS s/p TAVR and PPM 1 year ago, asthma on Advair, obesity, CAD, PMR on chronic prednisone at 2.5 mg, remote h/o b/l TKR, recent admission to [facility] in Dec for strept sanguinous bacteremia with knee seeding, s/o right knee explant on  and on long term Ceftriaxone therapy. Discharged to SNF on 18. Brought to ED on  for low grade fever and found to be hypoxemic to 91%, improved with supplemental O2.  CT chest non-con: b/l very small pleural effusions with adjacent atelectasis.   V/Q: low probability for PE  WBC 14.6 and YONATAN  Antibiotics switched to Cefepime   Inhalers: Symbicort and Duonebs  Denies any symptoms of cough, shortness of breath, chest tightness or wheezing. Denies nasal congestion. Has been sedentary for the majority of the past month 2/2 illness and recent right knee surgery.  Tmax 99.5    PAST MEDICAL & SURGICAL HISTORY:  CAD (coronary artery disease): HERBERT to LAD 2016  Aortic stenosis, severe  Uncomplicated asthma, unspecified asthma severity  Polymyalgia  Lumbar disc disease with radiculopathy  Spider veins  Anxiety  Severe aortic stenosis by prior echocardiogram:  and  2016  Dysphagia  Morbid obesity with BMI of 50.0-59.9, adult  Macular degeneration  Hiatal hernia  GERD (gastroesophageal reflux disease)  Sciatica  Osteoarthritis  H/O cardiac catheterization: 16 HERBERT placed on LAD  H/O endoscopy: with dilatation of esophageal stricture   S/P cataract surgery: x2; 3-4yr ago  S/P gastroplasty: 28 yrs ago  S/P cholecystectomy: more than 15 years ago  S/P total knee replacement: left, 15yr ago  S/P total knee replacement: right, 12yr ago  S/P hysterectomy: 24yr ago      FAMILY HISTORY:  No pertinent family history in first degree relatives      SOCIAL HISTORY:  Smoking: [x ] Never Smoked [ ] Former Smoker (__ packs x ___ years) [ ] Current Smoker  (__ packs x ___ years)  Substance Use: [x ] Never Used [ ] Used ____  EtOH Use: denies  Marital Status: [ ] Single [ ]  [ ]  [ ]   Occupation:  Recent Travel:  Country of Birth:  Advance Directives:    Allergies    No Known Allergies    Intolerances    IV Contrast (Flushing (Skin); Nausea)      HOME MEDICATIONS: reviewed in H&P     REVIEW OF SYSTEMS:  Constitutional: [+ ] subjective fevers [ ] chills [ ] weight loss [ ] weight gain  HEENT: [- ] dry eyes [ ] eye irritation [ ] postnasal drip [ ] nasal congestion  CV: [- ] chest pain [ -] orthopnea [ ] palpitations [ ] murmur  Resp: [- ] cough [- ] shortness of breath [- ] wheezing [- ] sputum [- ] hemoptysis  GI: [- ] nausea [- ] vomiting [ -] diarrhea [ ] constipation [ -] abd pain [ ] dysphagia   : [- ] dysuria [ ] nocturia [ ] hematuria [ ] increased urinary frequency  Musculoskeletal: [- ] myalgias [+ ] arthralgias   Skin: [- ] rash [ ] itch  Neurological: [- ] headache [ ] dizziness [ ] syncope [ ] weakness [ ] numbness  Psychiatric: [- ] anxiety [- ] depression  [x ] All other systems negative  [ ] Unable to assess ROS because ________    OBJECTIVE:  ICU Vital Signs Last 24 Hrs  T(C): 37.5 (2018 11:52), Max: 37.5 (2018 04:30)  T(F): 99.5 (2018 11:52), Max: 99.5 (2018 04:30)  HR: 79 (2018 11:52) (67 - 85)  BP: 111/68 (2018 11:52) (97/58 - 137/67)  BP(mean): 77 (2018 21:09) (77 - 77)  ABP: --  ABP(mean): --  RR: 18 (2018 11:52) (18 - 18)  SpO2: 96% (2018 11:52) (96% - 99%)        CAPILLARY BLOOD GLUCOSE  POCT Blood Glucose.: 110 mg/dL (2018 00:55)      PHYSICAL EXAM:  General:  NAD  HEENT:  NC/AT  Neck: Supple  Respiratory:  CTA B/L, no wheezes, crackles or rhonchi  Cardiovascular:  2/6 LAI, RRR  Abdomen: soft, NT/ND, +BS  Extremities: no clubbing, cyanosis or edema, warm, RLE in immobilizer  Skin: no rash  Neurological: AAOx3, no focal deficits  Psychiatry: not anxious, normal affect    HOSPITAL MEDICATIONS:  MEDICATIONS  (STANDING):  aspirin enteric coated 81 milliGRAM(s) Oral daily  buDESOnide 160 MICROgram(s)/formoterol 4.5 MICROgram(s) Inhaler 2 Puff(s) Inhalation two times a day  cefepime  IVPB 1000 milliGRAM(s) IV Intermittent every 12 hours  collagenase Ointment 1 Application(s) Topical two times a day  ferrous    sulfate 325 milliGRAM(s) Oral daily  heparin  Injectable 5000 Unit(s) SubCutaneous every 12 hours  lactobacillus acidophilus 1 Tablet(s) Oral daily  multivitamin 1 Tablet(s) Oral daily  pantoprazole    Tablet 40 milliGRAM(s) Oral before breakfast  predniSONE   Tablet 2.5 milliGRAM(s) Oral daily  simvastatin 20 milliGRAM(s) Oral at bedtime  sodium chloride 0.65% Nasal 1 Spray(s) Both Nostrils three times a day    MEDICATIONS  (PRN):  acetaminophen   Tablet. 650 milliGRAM(s) Oral every 6 hours PRN Mild Pain (1 - 3)  melatonin 3 milliGRAM(s) Oral at bedtime PRN Insomnia  oxyCODONE    IR 5 milliGRAM(s) Oral every 6 hours PRN Moderate Pain (4 - 6)      LABS:                        7.5    10.73 )-----------( 202      ( 2018 07:29 )             24.5     Hgb Trend: 7.5<--, 8.0<--, 8.1<--, 7.6<--, 6.7<--      142  |  106  |  27<H>  ----------------------------<  99  6.0<H>   |  26  |  1.41<H>    Ca    8.5      2018 10:24  Phos  5.1       Mg     2.1         TPro  6.5  /  Alb  2.9<L>  /  TBili  0.3  /  DBili  x   /  AST  14  /  ALT  5<L>  /  AlkPhos  77      Creatinine Trend: 1.41<--, 1.69<--, 1.90<--, 1.97<--, 1.52<--, 1.11<--  PT/INR - ( 2018 12:50 )   PT: 14.8 sec;   INR: 1.36 ratio         PTT - ( 2018 12:50 )  PTT:32.7 sec  Urinalysis Basic - ( 2018 13:21 )    Color: Yellow / Appearance: SL Turbid / S.019 / pH: x  Gluc: x / Ketone: Negative  / Bili: Negative / Urobili: Negative   Blood: x / Protein: 30 mg/dL / Nitrite: Negative   Leuk Esterase: Negative / RBC: 2-5 /HPF / WBC 5-10 /HPF   Sq Epi: x / Non Sq Epi: Occasional /HPF / Bacteria: x        Venous Blood Gas:   @ 16:29  7.33/52/42/26/71  VBG Lactate: 0.8      MICROBIOLOGY: Culture - Blood (18 @ 14:49)    Specimen Source: .Blood Blood-Peripheral    Culture Results:   No growth to date.        RADIOLOGY:  [x ] Reviewed and interpreted by me  < from: CT Chest No Cont (18 @ 17:45) >  EXAM:  CT CHEST                            PROCEDURE DATE:  2018            INTERPRETATION:  CLINICAL INFORMATION: Shortness of breath.    COMPARISON: CT Chest on 2017.    PROCEDURE:   CT of the Chest was performed without intravenous contrast.  Sagittal and coronal reformats were performed.      FINDINGS:    CHEST:     LUNGS AND LARGE AIRWAYS: Patent central airways. Patchy bilateral lower   lobe opacities. 2 mm calcified granuloma in the left upper lobe.  PLEURA: Small bilateral pleural effusions.  VESSELS: Right PICC line with tip in SVC. Atherosclerotic changes of the   thoracic aorta. No aneurysm.   HEART: Heart size is normal. No pericardial effusion. Status post TAVR.   Coronary artery calcification. Left chest wall pacemaker.  MEDIASTINUM AND PEGGY: No lymphadenopathy.  CHEST WALL AND LOWER NECK: Within normal limits.  VISUALIZED UPPER ABDOMEN: Status post cholecystectomy. Status post   gastric sleeve. Partially imaged left renal cyst, indeterminate.  BONES: Multileveldegenerative spinal changes.    IMPRESSION: Small bilateral pleural effusions with associated patchy   lower lobe opacities most likely represents atelectasis.    < end of copied text >    < from: NM Pulmonary Ventilation/Perfusion Scan (18 @ 15:56) >  EXAM:  NM PULM VENTILATION PERFUS IMG                                PROCEDURE DATE:  2018          INTERPRETATION:  RADIOPHARMACEUTICAL: 1 mCi Tc-99m-DTPA aerosol by   inhalation; 6 mCi Tc-99m-MAA, I.V.    CLINICAL STATEMENT: 77-year-old female with shortness of breath.    TECHNIQUE:  Ventilation and perfusion images of the lungs were obtained   following administration of Tc-99m-DTPA and Tc-99m-MAA. Images were   obtained in the anterior, posterior, both lateral, and all 4 oblique   projections. The study was interpreted in conjunction with chest   radiograph of 2018.    FINDINGS: There is a matched ventilation/perfusion defect involving the   posterior basal segment of the left lower lobe without corresponding   opacity on chest x-ray. There is heterogeneous tracer distribution in the   remainder of the lungs on both the ventilation and the perfusion images.   There are no mismatched defects.    IMPRESSION: Abnormal ventilation/perfusion lung scan.    Low probability of pulmonary embolus.    < end of copied text >      EKG: Sinus rhythm at 73 bpm

## 2018-01-24 NOTE — CONSULT NOTE ADULT - ASSESSMENT
76 yo F with a h/o AS s/p TAVR and PPM 1 year ago, asthma on Advair, obesity, CAD, PMR on chronic prednisone at 2.5 mg, remote h/o b/l TKR, recent admission to [facility] in Dec for strept sanguinous bacteremia with knee seeding, s/o right knee explant on 12/23 and on long term Ceftriaxone therapy. Discharged to SNF on 1/2/18. Brought to ED on 1/23 for low grade fever and found to be hypoxemic to 91%, improved with supplemental O2.  CT chest non-con: b/l very small pleural effusions with adjacent atelectasis.   V/Q: low probability for PE  WBC 14.6 and YONATAN  Antibiotics switched to Cefepime   Inhalers: Symbicort and Duonebs  Denies any symptoms of cough, shortness of breath, chest tightness or wheezing. Denies nasal congestion. Has been sedentary for the majority of the past month 2/2 illness and recent right knee surgery.  Tmax 99.5    On exam, O2 sats range 89-91% on RA, improves to 94% with deep breathing techniques.    A/P: Hypoxemia - likely from a combination of MYAH/OHS, bibasilar atelectasis. At this time, no bronchospasm on exam and has no respiratory symptoms, I do not believe this is pneumonia. On VBG with mild hypercapnia, though may be underestimating 2/2 YONATAN. ABG would be ideal to better assess this.   However agree with repeating TTE to evaluate valves and chambers for shunt as well as LE dopplers to r/o DVT,  Continue with supplemental O2, goal sats in the low 90s. Out of bed to chair  Incentive spirometer hourly - reviewed technique with patient with teachback.   C/W Symbicort and bronchodilators as needed.  Blood cultures negative to day  DVT prophylaxis    Thank you for the consult.  Dr. Kumar to follow up on 1/25.

## 2018-01-24 NOTE — DIETITIAN INITIAL EVALUATION ADULT. - OTHER INFO
Pt seen for BMI >40kg/m2 consult on 6TOW. Pt was admitted for fever and hypoxia. Pt states appetite has been poor since recent hospital stays, rehab, and current admission.  Pt is currently on a clear liquid diet. After review of medical records and discussion with  RN, PA and pt. it is unclear as to why pt is on a clear liquid diet. Pt denies chewing or swallowing issues and does not wear dentures. Pt denies current constipation, diarrhea, nausea and vomiting, however, states occasional nausea and vomiting PTA that she believes is due to "having her stomach stapled" 32 years ago for weight loss. Last BM 1/24. Pt reports NKFA. Provided verbal and written education on heart healthy diet. Pt accepted written material but was resistant to education in general. Pt seen for BMI >40kg/m2 consult on 6TOW. Pt was admitted for fever and hypoxia. Pt states appetite has been poor since recent hospital stays, rehab, and current admission.  Pt is currently on a clear liquid diet consuming 100%. After review of medical records and discussion with  RN, PA and pt. it is unclear as to why pt is on a clear liquid diet. Pt denies chewing or swallowing issues and does not wear dentures. Pt denies current constipation, diarrhea, nausea and vomiting, however, states occasional nausea and vomiting PTA that she believes is due to "having her stomach stapled" 32 years ago for weight loss. Last BM 1/24. Pt reports NKFA. Provided verbal and written education on heart healthy diet. Pt accepted written material but was resistant to education in general.

## 2018-01-24 NOTE — PROGRESS NOTE ADULT - PROBLEM SELECTOR PROBLEM 6
Elevated troponin Asthma, unspecified asthma severity, unspecified whether complicated, unspecified whether persistent

## 2018-01-24 NOTE — PROGRESS NOTE ADULT - PROBLEM SELECTOR PLAN 8
appreciate ortho input  PWB to RLE  cont oxycodone for now but if creatinine stays elevated would switch to dilaudid doubt ACS  monitor on telemetry

## 2018-01-24 NOTE — PROGRESS NOTE ADULT - ASSESSMENT
77-yo female recent rt hip hardware infection s/p removal and placement of spacer, on ceftriaxone through PICC, admitted with SIRS, unclear if there is an acute infection or not

## 2018-01-24 NOTE — CONSULT NOTE ADULT - ASSESSMENT
Low grade fever and hypoxia in a 78 yo female who is on day 32/42 treatment of a left PJI with strep sanguis.  No signs of an active left knee infection and her right knee incision is healing well.  I suspect atelectasis could be contributing to hypoxia and conceivably even low grade fever but I would not expect a leukocytosis.  Drug fever, occult DVT,intercurrent viral process(false negative RVP) as well as a new bacterial infection all possible.PMR "flare "would seem unlikely with patient on 2.5 of prednisone  PVE(ERLIN/PPM) is also posible but with a strep sanguis one would suspect she would respond unless she had developed an abscess.  Suggest:  1.await blood cultures x 2 sets  2.Cefepime okay,we may revert to CTX unless a new culture is positive  3.Favor TTE again to assess for changes  4.Favor lower extremity dopplers if not done, incentive spirometry  5.Follow exam, labs, and additional w/u pending course.

## 2018-01-24 NOTE — CHART NOTE - NSCHARTNOTEFT_GEN_A_CORE
Upon Nutritional Assessment by the Registered Dietitian your patient was determined to meet criteria / has evidence of the following diagnosis/diagnoses:          [ ]  Mild Protein Calorie Malnutrition        [ ]  Moderate Protein Calorie Malnutrition        [ ] Severe Protein Calorie Malnutrition        [ ] Unspecified Protein Calorie Malnutrition        [ ] Underweight / BMI <19        [X] Morbid Obesity / BMI > 40      Findings as based on:  [X] Comprehensive nutrition assessment   [ ] Nutrition Focused Physical Exam  [X] Other: BMI = 46.9kg/m2      Nutrition Plan/Recommendations:  Provided DASH/TLC nutrition education.        PROVIDER Section:     By signing this assessment you are acknowledging and agree with the diagnosis/diagnoses assigned by the Registered Dietitian    Comments:

## 2018-01-24 NOTE — PROGRESS NOTE ADULT - PROBLEM SELECTOR PROBLEM 4
Asthma, unspecified asthma severity, unspecified whether complicated, unspecified whether persistent Aortic stenosis, severe

## 2018-01-24 NOTE — DIETITIAN INITIAL EVALUATION ADULT. - NUTRITION INTERVENTION
Meals and Snack/Nutrition Education/Collaboration and Referral of Nutrition Care Nutrition Education/Meals and Snack/Medical Food Supplements/Collaboration and Referral of Nutrition Care

## 2018-01-24 NOTE — PROGRESS NOTE ADULT - ATTENDING COMMENTS
I agree with the above note and have personally seen and examined this patient. All pertinent films have been reviewed. Please refer to clinical documentation of the history, physical examinations, data summary, and both assessment and plan as documented above and with which I agree.    Readmitted to hospital for medical work up for low o2 sats and worsening labs, YONATAN, unclear etiology  right knee surgical wound continues to improve and healing, continue with bid collagenase to surgical wound for fibrinous exudate  PWB in KI, PT/OT, OOB  DVT PPx ASA 325BID  Antibiotics per ID  Appreciate medicine, pulmonology guidance and care      Nate Bass MD  Attending Orthopedic Surgeon

## 2018-01-24 NOTE — DIETITIAN INITIAL EVALUATION ADULT. - ENERGY NEEDS
ht = 63 inches, wt = 265 pounds (1/24/18), BMI = 46.9kg/m2, IBW + 10% = 127 pounds, 209%IBW    Other Pertinent Information: Per chart, this is 78 Y/o female with PMH of CAD S/P HERBERT, severe AS, h/o MVR, PMR on chronic steroids, asthma, OA, S/P PPM, S/P TKR with recent joint/hardware infection S/P removal and placement of spacer, on ceftriaxone through PICC, admitted with SIRS, unclear if there is an acute infection or not.  1+ edema luann. feet, ankles, right leg; wound noted on right knee, right pelvis erosion

## 2018-01-24 NOTE — DIETITIAN INITIAL EVALUATION ADULT. - PERTINENT LABORATORY DATA
1/24: Fingerstick: 110(H); Potassium: 6.0(H); BUN 27(H); Creatinine 1.41(H), eGFR 35; 1/23: Phosphorous: 5.1(H)

## 2018-01-24 NOTE — CONSULT NOTE ADULT - SUBJECTIVE AND OBJECTIVE BOX
HPI:   Patient is a 77y female with multiple medical problems including morbid obesity,CAD,AS s/p ERLIN and PPM 1 year ago,remote B/L TNR,who was admitted to [facility] on 17 with fatigue and dyspnea, and right knee pain,found to have strep sanguis bacteremia and RT PJI,s/p joint explant on , d/c to SNF on , who was transferred to hospital on  with low grade fever(99.7), lethragy and hypoxia.She was switched to cefepime,CT of chest with B/L atelectasis and nonspecific densites,V/Q low probability.At [facility] she had a nose bleed,required trip to ER, nasal packing,and her plavix  was stopped.She is comfortable on nasal oxygen.No drainage from rt knee.No URI symptoms.History of PMR managed with 2.5 mg of prednisone.No GI or  symptoms.She is on day day  treatment for her PJI.She has a TTE last month,mild mitral and aortic valvular disease,no vegetation seen.She had root canal 1 month prior to her knee infection and bacteremia.    REVIEW OF SYSTEMS:  All other review of systems negative (Comprehensive ROS)    PAST MEDICAL & SURGICAL HISTORY:  CAD (coronary artery disease): HERBERT to LAD 2016  Aortic stenosis, severe  Uncomplicated asthma, unspecified asthma severity  Polymyalgia  Lumbar disc disease with radiculopathy  Spider veins  Anxiety  Severe aortic stenosis by prior echocardiogram:  and  2016  Dysphagia  Morbid obesity with BMI of 50.0-59.9, adult  Macular degeneration  Hiatal hernia  GERD (gastroesophageal reflux disease)  Sciatica  Osteoarthritis  H/O cardiac catheterization: 16 HERBERT placed on LAD  H/O endoscopy: with dilatation of esophageal stricture   S/P cataract surgery: x2; 3-4yr ago  S/P gastroplasty: 28 yrs ago  S/P cholecystectomy: more than 15 years ago  S/P total knee replacement: left, 15yr ago  S/P total knee replacement: right, 12yr ago  S/P hysterectomy: 24yr ago      Allergies    No Known Allergies    Intolerances    IV Contrast (Flushing (Skin); Nausea)      Antimicrobials Day #  :day 2, day 32 total antibiotics  cefepime  IVPB 1000 milliGRAM(s) IV Intermittent every 12 hours    Other Medications:  acetaminophen   Tablet. 650 milliGRAM(s) Oral every 6 hours PRN  aspirin enteric coated 81 milliGRAM(s) Oral daily  buDESOnide 160 MICROgram(s)/formoterol 4.5 MICROgram(s) Inhaler 2 Puff(s) Inhalation two times a day  collagenase Ointment 1 Application(s) Topical two times a day  ferrous    sulfate 325 milliGRAM(s) Oral daily  heparin  Injectable 5000 Unit(s) SubCutaneous every 12 hours  lactobacillus acidophilus 1 Tablet(s) Oral daily  melatonin 3 milliGRAM(s) Oral at bedtime PRN  multivitamin 1 Tablet(s) Oral daily  oxyCODONE    IR 5 milliGRAM(s) Oral every 6 hours PRN  pantoprazole    Tablet 40 milliGRAM(s) Oral before breakfast  predniSONE   Tablet 2.5 milliGRAM(s) Oral daily  simvastatin 20 milliGRAM(s) Oral at bedtime  sodium chloride 0.65% Nasal 1 Spray(s) Both Nostrils three times a day      FAMILY HISTORY:  No pertinent family history in first degree relatives      SOCIAL HISTORY:  Smoking:  no   ETOH: no    Drug Use: no    Single     T(F): 99.5 (18 @ 11:52), Max: 100.3 (18 @ 12:49)  HR: 79 (18 @ 11:52)  BP: 111/68 (18 @ 11:52)  RR: 18 (18 @ 11:52)  SpO2: 96% (18 @ 11:52)  Wt(kg): --    PHYSICAL EXAM:  General: alert, no acute distress  Eyes:  anicteric, no conjunctival injection, no discharge  Oropharynx: no lesions or injection 	  Neck: supple, without adenopathy  Lungs: clear to auscultation,diminished at bases  Heart: regular rate and rhythm; no murmur, rubs or gallops  Abdomen: soft, nondistended, nontender, without mass or organomegaly  Skin: no lesions  Extremities: no clubbing, cyanosis,  trace edema  Neurologic: alert, oriented, moves all extremities  Rt knee incision is C/D/I  No rash, PICC line exit is okay(right arm)  LAB RESULTS:                        8.0    13.6  )-----------( 204      ( 2018 12:50 )             24.2         142  |  106  |  27<H>  ----------------------------<  99  6.0<H>   |  26  |  1.41<H>    Ca    8.5      2018 10:24  Phos  5.1       Mg     2.1         TPro  6.5  /  Alb  2.9<L>  /  TBili  0.3  /  DBili  x   /  AST  14  /  ALT  5<L>  /  AlkPhos  77      LIVER FUNCTIONS - ( 2018 05:44 )  Alb: 2.9 g/dL / Pro: 6.5 g/dL / ALK PHOS: 77 U/L / ALT: 5 U/L RC / AST: 14 U/L / GGT: x           Urinalysis Basic - ( 2018 13:21 )    Color: Yellow / Appearance: SL Turbid / S.019 / pH: x  Gluc: x / Ketone: Negative  / Bili: Negative / Urobili: Negative   Blood: x / Protein: 30 mg/dL / Nitrite: Negative   Leuk Esterase: Negative / RBC: 2-5 /HPF / WBC 5-10 /HPF   Sq Epi: x / Non Sq Epi: Occasional /HPF / Bacteria: x        MICROBIOLOGY:  RECENT CULTURES:    RVP  negative    RADIOLOGY REVIEWED:  < from: CT Chest No Cont (18 @ 17:45) >  IMPRESSION: Small bilateral pleural effusions with associated patchy   lower lobe opacities most likely represents atelectasis.    < end of copied text >  < from: NM Pulmonary Ventilation/Perfusion Scan (18 @ 15:56) >  IMPRESSION: Abnormal ventilation/perfusion lung scan.    Low probability of pulmonary embolus.      < from: Xray Chest 1 View AP/PA (18 @ 13:02) >  INTERPRETATION:  A single chest x-ray was obtained on 2018.    Indication: Fever. Rule out pneumonia.    Impression:    The heart is normal in size. The lungs are clear. A pacer is in good   position. A PICC line is seen on the right and the tip is in superior   vena cava or right atrium. No pneumothorax.

## 2018-01-24 NOTE — PROGRESS NOTE ADULT - PROBLEM SELECTOR PLAN 2
ESR 43, doubt endocarditis but low suspicion to test further if + blood cultures No clearcut source except perhaps atelectasis as she does not exhibit typical signs/symptoms of respiratory infection and her prior rt knee surgical site is healing appropriately  cefepime for now; monitor blood cultures; TTE pending  appreciate ID

## 2018-01-24 NOTE — PROGRESS NOTE ADULT - PROBLEM SELECTOR PLAN 1
will cont cefepime for now, doubt aspiration and given increased phlegm with lethargy/hypoxia most likely underlying lung source ? superimposed with atelectasis on CT scan  appreciate ID/pulm  at this point if continues to spike or wbc increases/pt clinically worsens would cover with vanco given multiple internal fomites - joint replacement, PPM, h/o MVR, PICC  f/u cultures  procalcitonin noted  if develops further URI symptoms consider repeat RVP  SQH for DVT prophylaxis no obvious acute pulmonary etiology except perhaps atelectasis  Repeating TTE and LE venous dopplers ordered  appreciate pulm

## 2018-01-24 NOTE — DIETITIAN INITIAL EVALUATION ADULT. - NS AS NUTRI INTERV ED CONTENT
Provided verbal and written nutrition education on heart healthy nutrition therapy. Discussed low fat and low sodium food sources, the importance of avoiding foods high in fat and high in sodium, examples of healthy unprocessed foods to consume during mealtimes and for snacks, consumption of fruits, vegetables, whole grains, lean protein sources. RD remains available.

## 2018-01-24 NOTE — DIETITIAN INITIAL EVALUATION ADULT. - ADHERENCE
Pt significant for heart history. Pt states that she limits salt when she cooks but also reports intake of high sodium snacks, and frequently eats out with no regard to dietary restrictions./poor

## 2018-01-24 NOTE — PROGRESS NOTE ADULT - PROBLEM SELECTOR PLAN 3
cont prednisone  if hypotension recurs would give stress dose steroids 2' problems #1 and #2   pt was at her baseline MS when I saw her today

## 2018-01-24 NOTE — PROGRESS NOTE ADULT - PROBLEM SELECTOR PLAN 4
cont advair equivalent  appreciate pulm ESR 43, doubt endocarditis but low suspicion to test further if + blood cultures

## 2018-01-24 NOTE — PROGRESS NOTE ADULT - SUBJECTIVE AND OBJECTIVE BOX
Denies cough, or fevers or chills or SOB  Had 2 loose BMS after receiving kayexelate this AM    Vital Signs Last 24 Hrs  T(C): 37.2 (24 Jan 2018 05:21), Max: 37.9 (23 Jan 2018 12:49)  T(F): 99 (24 Jan 2018 05:21), Max: 100.3 (23 Jan 2018 12:49)  HR: 85 (24 Jan 2018 04:30) (67 - 85)  BP: 137/67 (24 Jan 2018 04:30) (97/58 - 137/67)  BP(mean): 77 (23 Jan 2018 21:09) (77 - 77)  RR: 18 (24 Jan 2018 04:30) (16 - 18)  SpO2: 99% (24 Jan 2018 04:30) (97% - 99%)        		  · Constitutional Details	well-developed; well-groomed; well-nourished; no distress	  		  · Eyes Details	conjunctiva clear	  		  · ENMT Details	mouth; pharynx	  · Mouth	moist	  · Pharynx	no redness	  		  · Neck Details	supple; no JVD	  		  · Back Details	normal shape; ROM intact	  · Back Comments	nontender over ribs/left lateral thorax/back	  		  · Respiratory Details	airway patent; breath sounds equal; good air movement; respirations non-labored	  · Respiratory Comments	few crackles bases bilaterally	  		  · Cardiovascular Details	regular rate and rhythm  no murmur 	  		  · GI Normal	soft; nontender; no distention; bowel sounds normal	  		  · Extremities Details	no clubbing; no cyanosis	  · Extremities Comments	RLE in brace and just dressed by ortho LLE varicosities, no edema, warm and well perfused extremities	  		  · Radial Pulse	right normal; left normal	  		  · Neurological Details	alert and oriented x 3	  · Neurological Comments	nonfocal	  		  · Skin Details	warm and dry; color normal	  		  · Lymphatic Details	posterior cervical L; posterior cervical R; anterior cervical L; anterior cervical R	  · Posterior Cervical L	normal	  · Posterior Cervical R	normal	  · Anterior Cervical L	normal	  · Anterior Cervical R	normal	  		  · Musculoskeletal Details	decreased ROM; RLE brace in place	  		  · Psychiatric Details	normal affect; normal behavior	  · Additional PE	RUE picc site appears c/d/i

## 2018-01-25 DIAGNOSIS — Z29.9 ENCOUNTER FOR PROPHYLACTIC MEASURES, UNSPECIFIED: ICD-10-CM

## 2018-01-25 DIAGNOSIS — J98.11 ATELECTASIS: ICD-10-CM

## 2018-01-25 DIAGNOSIS — I25.10 ATHEROSCLEROTIC HEART DISEASE OF NATIVE CORONARY ARTERY WITHOUT ANGINA PECTORIS: ICD-10-CM

## 2018-01-25 DIAGNOSIS — J45.909 UNSPECIFIED ASTHMA, UNCOMPLICATED: ICD-10-CM

## 2018-01-25 DIAGNOSIS — R06.02 SHORTNESS OF BREATH: ICD-10-CM

## 2018-01-25 DIAGNOSIS — R09.02 HYPOXEMIA: ICD-10-CM

## 2018-01-25 DIAGNOSIS — E66.9 OBESITY, UNSPECIFIED: ICD-10-CM

## 2018-01-25 LAB
ALBUMIN SERPL ELPH-MCNC: 2.2 G/DL — LOW (ref 3.3–5)
ALP SERPL-CCNC: 71 U/L — SIGNIFICANT CHANGE UP (ref 40–120)
ALT FLD-CCNC: 9 U/L — LOW (ref 10–45)
ANION GAP SERPL CALC-SCNC: 11 MMOL/L — SIGNIFICANT CHANGE UP (ref 5–17)
AST SERPL-CCNC: 17 U/L — SIGNIFICANT CHANGE UP (ref 10–40)
BILIRUB SERPL-MCNC: 0.3 MG/DL — SIGNIFICANT CHANGE UP (ref 0.2–1.2)
BLD GP AB SCN SERPL QL: POSITIVE — SIGNIFICANT CHANGE UP
BUN SERPL-MCNC: 28 MG/DL — HIGH (ref 7–23)
CALCIUM SERPL-MCNC: 8.2 MG/DL — LOW (ref 8.4–10.5)
CHLORIDE SERPL-SCNC: 101 MMOL/L — SIGNIFICANT CHANGE UP (ref 96–108)
CO2 SERPL-SCNC: 25 MMOL/L — SIGNIFICANT CHANGE UP (ref 22–31)
CREAT SERPL-MCNC: 1.14 MG/DL — SIGNIFICANT CHANGE UP (ref 0.5–1.3)
FUNGUS SPEC QL CULT: SIGNIFICANT CHANGE UP
GLUCOSE SERPL-MCNC: 97 MG/DL — SIGNIFICANT CHANGE UP (ref 70–99)
HCT VFR BLD CALC: 23.5 % — LOW (ref 34.5–45)
HGB BLD-MCNC: 7.3 G/DL — LOW (ref 11.5–15.5)
INR BLD: 1.54 RATIO — HIGH (ref 0.88–1.16)
MCHC RBC-ENTMCNC: 28.3 PG — SIGNIFICANT CHANGE UP (ref 27–34)
MCHC RBC-ENTMCNC: 31.1 GM/DL — LOW (ref 32–36)
MCV RBC AUTO: 91.1 FL — SIGNIFICANT CHANGE UP (ref 80–100)
PLATELET # BLD AUTO: 194 K/UL — SIGNIFICANT CHANGE UP (ref 150–400)
POTASSIUM SERPL-MCNC: 5.6 MMOL/L — HIGH (ref 3.5–5.3)
POTASSIUM SERPL-SCNC: 5.6 MMOL/L — HIGH (ref 3.5–5.3)
PROT SERPL-MCNC: 6.4 G/DL — SIGNIFICANT CHANGE UP (ref 6–8.3)
PROTHROM AB SERPL-ACNC: 16.8 SEC — HIGH (ref 9.8–12.7)
RBC # BLD: 2.58 M/UL — LOW (ref 3.8–5.2)
RBC # FLD: 17.4 % — HIGH (ref 10.3–14.5)
RH IG SCN BLD-IMP: POSITIVE — SIGNIFICANT CHANGE UP
SODIUM SERPL-SCNC: 137 MMOL/L — SIGNIFICANT CHANGE UP (ref 135–145)
WBC # BLD: 10.09 K/UL — SIGNIFICANT CHANGE UP (ref 3.8–10.5)
WBC # FLD AUTO: 10.09 K/UL — SIGNIFICANT CHANGE UP (ref 3.8–10.5)

## 2018-01-25 PROCEDURE — 93970 EXTREMITY STUDY: CPT | Mod: 26

## 2018-01-25 PROCEDURE — 73562 X-RAY EXAM OF KNEE 3: CPT | Mod: 26,RT

## 2018-01-25 PROCEDURE — 99233 SBSQ HOSP IP/OBS HIGH 50: CPT

## 2018-01-25 PROCEDURE — 93306 TTE W/DOPPLER COMPLETE: CPT | Mod: 26

## 2018-01-25 RX ORDER — FUROSEMIDE 40 MG
20 TABLET ORAL ONCE
Qty: 0 | Refills: 0 | Status: COMPLETED | OUTPATIENT
Start: 2018-01-25 | End: 2018-01-25

## 2018-01-25 RX ORDER — CEFTRIAXONE 500 MG/1
2 INJECTION, POWDER, FOR SOLUTION INTRAMUSCULAR; INTRAVENOUS EVERY 24 HOURS
Qty: 0 | Refills: 0 | Status: DISCONTINUED | OUTPATIENT
Start: 2018-01-25 | End: 2018-01-29

## 2018-01-25 RX ORDER — APIXABAN 2.5 MG/1
TABLET, FILM COATED ORAL
Qty: 0 | Refills: 0 | Status: DISCONTINUED | OUTPATIENT
Start: 2018-01-25 | End: 2018-01-25

## 2018-01-25 RX ORDER — ACETAMINOPHEN 500 MG
650 TABLET ORAL EVERY 6 HOURS
Qty: 0 | Refills: 0 | Status: DISCONTINUED | OUTPATIENT
Start: 2018-01-25 | End: 2018-01-29

## 2018-01-25 RX ORDER — WARFARIN SODIUM 2.5 MG/1
5 TABLET ORAL ONCE
Qty: 0 | Refills: 0 | Status: COMPLETED | OUTPATIENT
Start: 2018-01-25 | End: 2018-01-25

## 2018-01-25 RX ORDER — ALPRAZOLAM 0.25 MG
0.25 TABLET ORAL DAILY
Qty: 0 | Refills: 0 | Status: DISCONTINUED | OUTPATIENT
Start: 2018-01-25 | End: 2018-01-29

## 2018-01-25 RX ORDER — LANOLIN ALCOHOL/MO/W.PET/CERES
1 CREAM (GRAM) TOPICAL AT BEDTIME
Qty: 0 | Refills: 0 | Status: DISCONTINUED | OUTPATIENT
Start: 2018-01-25 | End: 2018-01-29

## 2018-01-25 RX ORDER — APIXABAN 2.5 MG/1
5 TABLET, FILM COATED ORAL EVERY 12 HOURS
Qty: 0 | Refills: 0 | Status: DISCONTINUED | OUTPATIENT
Start: 2018-01-25 | End: 2018-01-25

## 2018-01-25 RX ORDER — APIXABAN 2.5 MG/1
5 TABLET, FILM COATED ORAL
Qty: 0 | Refills: 0 | Status: DISCONTINUED | OUTPATIENT
Start: 2018-01-25 | End: 2018-01-25

## 2018-01-25 RX ORDER — APIXABAN 2.5 MG/1
5 TABLET, FILM COATED ORAL ONCE
Qty: 0 | Refills: 0 | Status: COMPLETED | OUTPATIENT
Start: 2018-01-25 | End: 2018-01-25

## 2018-01-25 RX ORDER — ENOXAPARIN SODIUM 100 MG/ML
120 INJECTION SUBCUTANEOUS
Qty: 0 | Refills: 0 | Status: DISCONTINUED | OUTPATIENT
Start: 2018-01-25 | End: 2018-01-29

## 2018-01-25 RX ADMIN — OXYCODONE HYDROCHLORIDE 5 MILLIGRAM(S): 5 TABLET ORAL at 20:40

## 2018-01-25 RX ADMIN — Medication 1 SPRAY(S): at 06:10

## 2018-01-25 RX ADMIN — HEPARIN SODIUM 5000 UNIT(S): 5000 INJECTION INTRAVENOUS; SUBCUTANEOUS at 06:10

## 2018-01-25 RX ADMIN — CEFEPIME 100 MILLIGRAM(S): 1 INJECTION, POWDER, FOR SOLUTION INTRAMUSCULAR; INTRAVENOUS at 08:32

## 2018-01-25 RX ADMIN — WARFARIN SODIUM 5 MILLIGRAM(S): 2.5 TABLET ORAL at 20:40

## 2018-01-25 RX ADMIN — Medication 3 MILLIGRAM(S): at 22:17

## 2018-01-25 RX ADMIN — Medication 1 APPLICATION(S): at 22:48

## 2018-01-25 RX ADMIN — ENOXAPARIN SODIUM 120 MILLIGRAM(S): 100 INJECTION SUBCUTANEOUS at 17:56

## 2018-01-25 RX ADMIN — Medication 1 APPLICATION(S): at 08:00

## 2018-01-25 RX ADMIN — Medication 2.5 MILLIGRAM(S): at 06:10

## 2018-01-25 RX ADMIN — BUDESONIDE AND FORMOTEROL FUMARATE DIHYDRATE 2 PUFF(S): 160; 4.5 AEROSOL RESPIRATORY (INHALATION) at 06:10

## 2018-01-25 RX ADMIN — OXYCODONE HYDROCHLORIDE 5 MILLIGRAM(S): 5 TABLET ORAL at 09:38

## 2018-01-25 RX ADMIN — Medication 1 SPRAY(S): at 22:23

## 2018-01-25 RX ADMIN — PANTOPRAZOLE SODIUM 40 MILLIGRAM(S): 20 TABLET, DELAYED RELEASE ORAL at 06:10

## 2018-01-25 RX ADMIN — Medication 1 TABLET(S): at 14:12

## 2018-01-25 RX ADMIN — Medication 81 MILLIGRAM(S): at 14:12

## 2018-01-25 RX ADMIN — BUDESONIDE AND FORMOTEROL FUMARATE DIHYDRATE 2 PUFF(S): 160; 4.5 AEROSOL RESPIRATORY (INHALATION) at 17:56

## 2018-01-25 RX ADMIN — Medication 20 MILLIGRAM(S): at 22:23

## 2018-01-25 RX ADMIN — SIMVASTATIN 20 MILLIGRAM(S): 20 TABLET, FILM COATED ORAL at 20:40

## 2018-01-25 RX ADMIN — OXYCODONE HYDROCHLORIDE 5 MILLIGRAM(S): 5 TABLET ORAL at 08:38

## 2018-01-25 RX ADMIN — Medication 1 SPRAY(S): at 14:12

## 2018-01-25 RX ADMIN — OXYCODONE HYDROCHLORIDE 5 MILLIGRAM(S): 5 TABLET ORAL at 21:30

## 2018-01-25 RX ADMIN — APIXABAN 5 MILLIGRAM(S): 2.5 TABLET, FILM COATED ORAL at 12:52

## 2018-01-25 RX ADMIN — Medication 325 MILLIGRAM(S): at 14:12

## 2018-01-25 RX ADMIN — Medication 650 MILLIGRAM(S): at 12:52

## 2018-01-25 RX ADMIN — CEFTRIAXONE 100 GRAM(S): 500 INJECTION, POWDER, FOR SOLUTION INTRAMUSCULAR; INTRAVENOUS at 22:46

## 2018-01-25 RX ADMIN — Medication 650 MILLIGRAM(S): at 13:52

## 2018-01-25 NOTE — PROGRESS NOTE ADULT - ASSESSMENT
A/P:  77yFemale s/p R stg 1 rev TKA 12/23 admitted for fever and hypoxemia    Pain control  DVT ppx  PT/PWB RLE  2x daily dressing changes with collagenase  OOB  FU labs  fu dop ordered  fu official read xray right knee  Medical management appreciated  Incentive spirometry  attending aware

## 2018-01-25 NOTE — PROGRESS NOTE ADULT - PROBLEM SELECTOR PLAN 2
initialte duoneb via HHN q 6h  symbicort 160 2 bid  spiriva  1 puff per day  incentive spirometry  pulmonary toilet-incentive spirometry, Chest Pt-acapella/chest vest-up to 5 times per day.    maintain oxygen levels above 90%-supplemental oxygen via nasal canula-humidified    All nebulized therapy is to be administered via hand held nebulizer-(patient must gargle and spit with water after use)

## 2018-01-25 NOTE — PHYSICAL THERAPY INITIAL EVALUATION ADULT - ACTIVE RANGE OF MOTION EXAMINATION, REHAB EVAL
right LE NT in knee immobilizer/bilateral  lower extremity Active ROM was WFL (within functional limits)/bilateral upper extremity Active ROM was WFL (within functional limits)/deficits as listed below

## 2018-01-25 NOTE — PHYSICAL THERAPY INITIAL EVALUATION ADULT - CRITERIA FOR SKILLED THERAPEUTIC INTERVENTIONS
therapy frequency/predicted duration of therapy intervention/functional limitations in following categories/anticipated discharge recommendation/anticipated equipment needs at discharge/impairments found

## 2018-01-25 NOTE — PROGRESS NOTE ADULT - ASSESSMENT
Low grade fever and hypoxia in a 76 yo female who is on day 32/42 treatment of a left PJI with strep sanguis.  No signs of an active left knee infection and her right knee incision is healing well.  I suspect atelectasis could be contributing to hypoxia and conceivably even low grade fever but I would not expect a leukocytosis.  Drug fever, occult DVT,intercurrent viral process(false negative RVP) as well as a new bacterial infection all possible.PMR "flare "would seem unlikely with patient on 2.5 of prednisone  PVE(ERLIN/PPM) is also posible but with a strep sanguis one would suspect she would respond unless she had developed an abscess.  ECHO needs to be reviewed by Dr Roblero  Agree with pulmonary,no evidence of pneumonia  No obvious new infection  Knee appears satisfactory  Possibly fever secondary to DVT  Plan:  1.resume CTX, d/c cefepime  2.Anticoagulation and or IVC filter per medicine  3.Fever can be approached in a non empiric conservative fashion  4.As long as blood cultures stay negative no pressing need for a ERIKA  5.drug fever to CTX can be insidious in onset, would prefer to follow patient on this med.

## 2018-01-25 NOTE — PHYSICAL THERAPY INITIAL EVALUATION ADULT - LEVEL OF INDEPENDENCE: SUPINE/SIT, REHAB EVAL
sheet transfer from stretcher to bed max x 3 persons sheet transfer from stretcher to bed max x 3 persons/maximum assist (25% patients effort)

## 2018-01-25 NOTE — PROGRESS NOTE ADULT - ASSESSMENT
78 yo F with a h/o AS s/p TAVR and PPM 1 year ago, asthma on Advair, obesity, CAD, PMR on chronic prednisone at 2.5 mg, remote h/o b/l TKR, recent admission to [facility] in Dec for strept sanguinous bacteremia with knee seeding, s/o right knee explant on 12/23 and on long term Ceftriaxone therapy. Discharged to SNF on 1/2/18. Brought to ED on 1/23 for low grade fever and found to be hypoxemic to 91%, improved with supplemental O2.  CT chest non-con: b/l very small pleural effusions with adjacent atelectasis.   V/Q: low probability for PE  WBC 14.6 and YONATAN  Antibiotics switched to Cefepime   Inhalers: Symbicort and Duonebs  Denies any symptoms of cough, shortness of breath, chest tightness or wheezing. Denies nasal congestion. Has been sedentary for the majority of the past month 2/2 illness and recent right knee surgery.  Tmax 99.5    On exam, O2 sats range 89-91% on RA, improves to 94% with deep breathing techniques.    A/P: Hypoxemia - likely from a combination of MYAH/OHS, bibasilar atelectasis. At this time, no bronchospasm on exam and has no respiratory symptoms, I do not believe this is pneumonia. On VBG with mild hypercapnia, though may be underestimating 2/2 YONATAN. ABG would be ideal to better assess this.   However agree with repeating TTE to evaluate valves and chambers for shunt as well as LE dopplers to r/o DVT,  Continue with supplemental O2, goal sats in the low 90s. Out of bed to chair  Incentive spirometer hourly - reviewed technique with patient with teachback.   C/W Symbicort and bronchodilators as needed.  Blood cultures negative to day  DVT prophylaxis    Thank you for the consult.

## 2018-01-25 NOTE — PHYSICAL THERAPY INITIAL EVALUATION ADULT - RANGE OF MOTION EXAMINATION, REHAB EVAL
bilateral lower extremity ROM was WFL (within functional limits)/except R knee in knee immobilizer, N/A/bilateral upper extremity ROM was WFL (within functional limits)

## 2018-01-25 NOTE — PROGRESS NOTE ADULT - ATTENDING COMMENTS
I agree with the above note on this patient. All pertinent films have been reviewed. Please refer to clinical documentation of the history, physical examinations, data summary, and both assessment and plan as documented above and with which I agree.    XR wnl spacer    has acute dvt, medicine to treat aggressively, in agreement, please consider consulting vascular surgery for IVC filter placement  PWB, KI, PT/OT, OOB  appreciate medical work up    Nate Bass MD  Attending Orthopedic Surgeon

## 2018-01-25 NOTE — PROGRESS NOTE ADULT - SUBJECTIVE AND OBJECTIVE BOX
CHIEF COMPLAINT: better--pain on side    Interval Events:    REVIEW OF SYSTEMS:  Constitutional: No fevers or chills. No weight loss. No fatigue or generalized malaise.  Eyes: No itching or discharge from the eyes  ENT: No ear pain. No ear discharge. No nasal congestion. No post nasal drip. No epistaxis. No throat pain. No sore throat. No difficulty swallowing.   CV: No chest pain. No palpitations. No lightheadedness or dizziness.   Resp: No dyspnea at rest. No dyspnea on exertion. No orthopnea. No wheezing. No cough. No stridor. No sputum production. No chest pain with respiration.  GI: No nausea. No vomiting. No diarrhea.  MSK: No joint pain or pain in any extremities  Integumentary: No skin lesions. No pedal edema.  Neurological: No gross motor weakness. No sensory changes.  [ ] All other systems negative  [ ] Unable to assess ROS because ________    OBJECTIVE:  ICU Vital Signs Last 24 Hrs  T(C): 37.4 (2018 04:47), Max: 37.5 (2018 11:52)  T(F): 99.3 (2018 04:47), Max: 99.5 (2018 11:52)  HR: 81 (2018 04:47) (78 - 81)  BP: 126/71 (2018 04:47) (111/68 - 126/71)  BP(mean): --  ABP: --  ABP(mean): --  RR: 18 (2018 04:47) (18 - 18)  SpO2: 94% (2018 04:47) (94% - 96%)        CAPILLARY BLOOD GLUCOSE      POCT Blood Glucose.: 110 mg/dL (2018 00:55)      PHYSICAL EXAM: NAD in bed  General: Awake, alert, oriented X 3.   HEENT: Atraumatic, normocephalic.                 Mallampatti Grade 3                No nasal congestion.                No tonsillar or pharyngeal exudates.  Lymph Nodes: No palpable lymphadenopathy  Neck: No JVD. No carotid bruit.   Respiratory: reduced chest expansion                         Normal percussion                         Normal and equal air entry                         No wheeze, rhonchi or rales.  Cardiovascular: S1 S2 normal. No murmurs, rubs or gallops.   Abdomen: Soft, non-tender, non-distended. No organomegaly.  Extremities: Warm to touch. Peripheral pulse palpable. No pedal edema.++ right leg wrapped  Skin: No rashes or skin lesions  Neurological: Motor and sensory examination equal and normal in all four extremities.  Psychiatry: Appropriate mood and affect.    HOSPITAL MEDICATIONS:  MEDICATIONS  (STANDING):  aspirin enteric coated 81 milliGRAM(s) Oral daily  buDESOnide 160 MICROgram(s)/formoterol 4.5 MICROgram(s) Inhaler 2 Puff(s) Inhalation two times a day  cefepime  IVPB 1000 milliGRAM(s) IV Intermittent every 12 hours  collagenase Ointment 1 Application(s) Topical two times a day  ferrous    sulfate 325 milliGRAM(s) Oral daily  heparin  Injectable 5000 Unit(s) SubCutaneous every 12 hours  lactobacillus acidophilus 1 Tablet(s) Oral daily  multivitamin 1 Tablet(s) Oral daily  pantoprazole    Tablet 40 milliGRAM(s) Oral before breakfast  predniSONE   Tablet 2.5 milliGRAM(s) Oral daily  simvastatin 20 milliGRAM(s) Oral at bedtime  sodium chloride 0.65% Nasal 1 Spray(s) Both Nostrils three times a day    MEDICATIONS  (PRN):  acetaminophen   Tablet. 650 milliGRAM(s) Oral every 6 hours PRN Mild Pain (1 - 3)  melatonin 3 milliGRAM(s) Oral at bedtime PRN Insomnia  oxyCODONE    IR 5 milliGRAM(s) Oral every 6 hours PRN Moderate Pain (4 - 6)      LABS:                        7.5    10.73 )-----------( 202      ( 2018 07:29 )             24.5         137  |  101  |  29<H>  ----------------------------<  124<H>  5.3   |  26  |  1.49<H>    Ca    8.5      2018 18:41  Phos  5.1       Mg     2.0         TPro  6.5  /  Alb  2.9<L>  /  TBili  0.3  /  DBili  x   /  AST  14  /  ALT  5<L>  /  AlkPhos  77      PT/INR - ( 2018 12:50 )   PT: 14.8 sec;   INR: 1.36 ratio         PTT - ( 2018 12:50 )  PTT:32.7 sec  Urinalysis Basic - ( 2018 13:21 )    Color: Yellow / Appearance: SL Turbid / S.019 / pH: x  Gluc: x / Ketone: Negative  / Bili: Negative / Urobili: Negative   Blood: x / Protein: 30 mg/dL / Nitrite: Negative   Leuk Esterase: Negative / RBC: 2-5 /HPF / WBC 5-10 /HPF   Sq Epi: x / Non Sq Epi: Occasional /HPF / Bacteria: x        Venous Blood Gas:   @ 16:29  7.33/52/42/26/71  VBG Lactate: 0.8      MICROBIOLOGY:     RADIOLOGY:  [ ] Reviewed and interpreted by me    Point of Care Ultrasound Findings:    PFT:    EKG:

## 2018-01-25 NOTE — PHYSICAL THERAPY INITIAL EVALUATION ADULT - ADDITIONAL COMMENTS
contd from above: Per aide assisting patient in history - pt was doing well over weekend, had multiple visitors on Sunday and Sunday night had episode profuse nausea with vomiting of mucous though no cough or nasal congestion - improved with zofran ordered at rehab.  Monday morning woke and was doing well until returning from PT in afternoon when patient was very lethargic with chills, went to bed.  Ate minimal food for dinner and slept through the night.  This am pt was still lethargic and reportedly was febrile with hypoxia and was transported to [facility]. contd from above: Per aide assisting patient in history - pt was doing well over weekend, had multiple visitors on Sunday and Sunday night had episode profuse nausea with vomiting of mucous though no cough or nasal congestion - improved with zofran ordered at rehab.  Monday morning woke and was doing well until returning from PT in afternoon when patient was very lethargic with chills, went to bed.  Ate minimal food for dinner and slept through the night.  This am pt was still lethargic and reportedly was febrile with hypoxia and was transported to [facility].CT chest: small B pleural effusions    social history: pt previously at rehab x 3 weeks, however prior to rehab, pt independent with all moblity. pt ambulated with straight cane, own rolling walker, no stairs to negotiate, elevator building. pt has HHA during this hospitalization

## 2018-01-25 NOTE — PROGRESS NOTE ADULT - SUBJECTIVE AND OBJECTIVE BOX
CC: f/u for fever    Patient reports no new complaints, crying over immobility secondary to knee surgery    REVIEW OF SYSTEMS:  All other review of systems negative (Comprehensive ROS)    Antimicrobials Day #  33/42:  cefTRIAXone   IVPB 2 Gram(s) IV Intermittent every 24 hours    Other Medications Reviewed    T(F): 97.8 (01-25-18 @ 12:48), Max: 100.6 (01-25-18 @ 08:18)  HR: 87 (01-25-18 @ 12:48)  BP: 128/75 (01-25-18 @ 12:48)  RR: 18 (01-25-18 @ 12:48)  SpO2: 96% (01-25-18 @ 12:48)  Wt(kg): --    PHYSICAL EXAM:  General: alert, no acute distress  Eyes:  anicteric, no conjunctival injection, no discharge  Oropharynx: no lesions or injection 	  Neck: supple, without adenopathy  Lungs: clear to auscultation  Heart: regular rate and rhythm; no murmur, rubs or gallops  Abdomen: soft, nondistended, nontender, without mass or organomegaly  Skin: no lesions  Extremities: no clubbing, cyanosis, or edema  Neurologic: alert, oriented, moves all extremities  Rt knee incision is intact except for a small area of breakdown distally, no cellulitis or drainage  LAB RESULTS:                        7.3    10.09 )-----------( 194      ( 25 Jan 2018 07:54 )             23.5     01-25    137  |  101  |  28<H>  ----------------------------<  97  5.6<H>   |  25  |  1.14    Ca    8.2<L>      25 Jan 2018 08:00  Phos  5.1     01-23  Mg     2.0     01-24    TPro  6.4  /  Alb  2.2<L>  /  TBili  0.3  /  DBili  x   /  AST  17  /  ALT  9<L>  /  AlkPhos  71  01-25    LIVER FUNCTIONS - ( 25 Jan 2018 08:00 )  Alb: 2.2 g/dL / Pro: 6.4 g/dL / ALK PHOS: 71 U/L / ALT: 9 U/L / AST: 17 U/L / GGT: x             MICROBIOLOGY:  RECENT CULTURES:  01-23 @ 23:08 .Blood Blood-Peripheral     No growth to date.      01-23 @ 16:09 .Urine Clean Catch (Midstream)     No growth      01-23 @ 14:49 .Blood Blood-Peripheral     No growth to date.          RADIOLOGY REVIEWED:  LE dopplers Rt leg DVT  ECHO no vegetations  Chest CT atelectasis.< from: Xray Knee 3 Views, Right (01.25.18 @ 10:36) >    TECHNIQUE:  Radiograph of the right knee 3 views.      COMPARISON: Radiograph of theright knee 1/1/2018, 12/31/2017.    IMPRESSION:     Status post explantation of right knee arthroplasty with placement of   antibiotic spacer. No acute fracture. Diffuse soft tissue swelling.

## 2018-01-25 NOTE — PROGRESS NOTE ADULT - SUBJECTIVE AND OBJECTIVE BOX
Patient seen and examined. Pain controlled. No acute events overnight. feeling ok overall.                          7.5    10.73 )-----------( 202      ( 24 Jan 2018 07:29 )             24.5     01-24    137  |  101  |  29<H>  ----------------------------<  124<H>  5.3   |  26  |  1.49<H>    Ca    8.5      24 Jan 2018 18:41  Phos  5.1     01-23  Mg     2.0     01-24    TPro  6.5  /  Alb  2.9<L>  /  TBili  0.3  /  DBili  x   /  AST  14  /  ALT  5<L>  /  AlkPhos  77  01-24    PT/INR - ( 23 Jan 2018 12:50 )   PT: 14.8 sec;   INR: 1.36 ratio         PTT - ( 23 Jan 2018 12:50 )  PTT:32.7 sec  Vital Signs Last 24 Hrs  T(C): 37.4 (01-25-18 @ 04:47), Max: 37.5 (01-24-18 @ 11:52)  T(F): 99.3 (01-25-18 @ 04:47), Max: 99.5 (01-24-18 @ 11:52)  HR: 81 (01-25-18 @ 04:47) (78 - 81)  BP: 126/71 (01-25-18 @ 04:47) (111/68 - 126/71)  BP(mean): --  RR: 18 (01-25-18 @ 04:47) (18 - 18)  SpO2: 94% (01-25-18 @ 04:47) (94% - 96%)      Physical Exam  Gen: NAD  R LE:   Dressing c/d/i, KI present  +ehl/fhl/ta/gs function  L2-S1 silt  Dp/pt pulse intact  No calf ttp  Compartments soft

## 2018-01-25 NOTE — PHYSICAL THERAPY INITIAL EVALUATION ADULT - PERTINENT HX OF CURRENT PROBLEM, REHAB EVAL
77 f with extensive PMH including CAD s/p HERBERT, severe AS, h/o MVR, PMR on chronic steroids, asthma, OA, s/p PPM, s/p TKR with recent joint infection s/p explant and abx spacer now in rehab for 3 wks on CTX now admitted with fever and hypoxia. contd below:

## 2018-01-25 NOTE — PROGRESS NOTE ADULT - ATTENDING COMMENTS
as above  optimize resp regimen w/ bronchodilators and Neb rx--OOB as able  ABX as per ID    Everett Kumar MD-Pulmonary   [phone]

## 2018-01-26 DIAGNOSIS — I82.409 ACUTE EMBOLISM AND THROMBOSIS OF UNSPECIFIED DEEP VEINS OF UNSPECIFIED LOWER EXTREMITY: ICD-10-CM

## 2018-01-26 DIAGNOSIS — I82.401 ACUTE EMBOLISM AND THROMBOSIS OF UNSPECIFIED DEEP VEINS OF RIGHT LOWER EXTREMITY: ICD-10-CM

## 2018-01-26 LAB
ANION GAP SERPL CALC-SCNC: 10 MMOL/L — SIGNIFICANT CHANGE UP (ref 5–17)
ANION GAP SERPL CALC-SCNC: 14 MMOL/L — SIGNIFICANT CHANGE UP (ref 5–17)
BUN SERPL-MCNC: 23 MG/DL — SIGNIFICANT CHANGE UP (ref 7–23)
BUN SERPL-MCNC: 28 MG/DL — HIGH (ref 7–23)
CALCIUM SERPL-MCNC: 8.3 MG/DL — LOW (ref 8.4–10.5)
CALCIUM SERPL-MCNC: 8.9 MG/DL — SIGNIFICANT CHANGE UP (ref 8.4–10.5)
CHLORIDE SERPL-SCNC: 100 MMOL/L — SIGNIFICANT CHANGE UP (ref 96–108)
CHLORIDE SERPL-SCNC: 102 MMOL/L — SIGNIFICANT CHANGE UP (ref 96–108)
CO2 SERPL-SCNC: 25 MMOL/L — SIGNIFICANT CHANGE UP (ref 22–31)
CO2 SERPL-SCNC: 27 MMOL/L — SIGNIFICANT CHANGE UP (ref 22–31)
CREAT SERPL-MCNC: 1.15 MG/DL — SIGNIFICANT CHANGE UP (ref 0.5–1.3)
CREAT SERPL-MCNC: 1.24 MG/DL — SIGNIFICANT CHANGE UP (ref 0.5–1.3)
GLUCOSE SERPL-MCNC: 109 MG/DL — HIGH (ref 70–99)
GLUCOSE SERPL-MCNC: 118 MG/DL — HIGH (ref 70–99)
HCT VFR BLD CALC: 26.6 % — LOW (ref 34.5–45)
HGB BLD-MCNC: 8.3 G/DL — LOW (ref 11.5–15.5)
INR BLD: 1.46 RATIO — HIGH (ref 0.88–1.16)
MCHC RBC-ENTMCNC: 28 PG — SIGNIFICANT CHANGE UP (ref 27–34)
MCHC RBC-ENTMCNC: 31.2 GM/DL — LOW (ref 32–36)
MCV RBC AUTO: 89.9 FL — SIGNIFICANT CHANGE UP (ref 80–100)
PLATELET # BLD AUTO: 195 K/UL — SIGNIFICANT CHANGE UP (ref 150–400)
POTASSIUM SERPL-MCNC: 5.7 MMOL/L — HIGH (ref 3.5–5.3)
POTASSIUM SERPL-MCNC: 5.7 MMOL/L — HIGH (ref 3.5–5.3)
POTASSIUM SERPL-MCNC: 6.1 MMOL/L — HIGH (ref 3.5–5.3)
POTASSIUM SERPL-SCNC: 5.7 MMOL/L — HIGH (ref 3.5–5.3)
POTASSIUM SERPL-SCNC: 5.7 MMOL/L — HIGH (ref 3.5–5.3)
POTASSIUM SERPL-SCNC: 6.1 MMOL/L — HIGH (ref 3.5–5.3)
PROTHROM AB SERPL-ACNC: 16.6 SEC — HIGH (ref 10–13.1)
RBC # BLD: 2.96 M/UL — LOW (ref 3.8–5.2)
RBC # FLD: 16.7 % — HIGH (ref 10.3–14.5)
SODIUM SERPL-SCNC: 137 MMOL/L — SIGNIFICANT CHANGE UP (ref 135–145)
SODIUM SERPL-SCNC: 141 MMOL/L — SIGNIFICANT CHANGE UP (ref 135–145)
WBC # BLD: 9.25 K/UL — SIGNIFICANT CHANGE UP (ref 3.8–10.5)
WBC # FLD AUTO: 9.25 K/UL — SIGNIFICANT CHANGE UP (ref 3.8–10.5)

## 2018-01-26 PROCEDURE — 93010 ELECTROCARDIOGRAM REPORT: CPT

## 2018-01-26 PROCEDURE — 99233 SBSQ HOSP IP/OBS HIGH 50: CPT

## 2018-01-26 RX ORDER — OXYCODONE HYDROCHLORIDE 5 MG/1
10 TABLET ORAL EVERY 6 HOURS
Qty: 0 | Refills: 0 | Status: DISCONTINUED | OUTPATIENT
Start: 2018-01-26 | End: 2018-01-28

## 2018-01-26 RX ORDER — WARFARIN SODIUM 2.5 MG/1
5 TABLET ORAL ONCE
Qty: 0 | Refills: 0 | Status: COMPLETED | OUTPATIENT
Start: 2018-01-26 | End: 2018-01-26

## 2018-01-26 RX ORDER — SODIUM POLYSTYRENE SULFONATE 4.1 MEQ/G
30 POWDER, FOR SUSPENSION ORAL ONCE
Qty: 0 | Refills: 0 | Status: COMPLETED | OUTPATIENT
Start: 2018-01-26 | End: 2018-01-26

## 2018-01-26 RX ADMIN — PANTOPRAZOLE SODIUM 40 MILLIGRAM(S): 20 TABLET, DELAYED RELEASE ORAL at 05:39

## 2018-01-26 RX ADMIN — Medication 1 TABLET(S): at 12:01

## 2018-01-26 RX ADMIN — OXYCODONE HYDROCHLORIDE 10 MILLIGRAM(S): 5 TABLET ORAL at 21:20

## 2018-01-26 RX ADMIN — BUDESONIDE AND FORMOTEROL FUMARATE DIHYDRATE 2 PUFF(S): 160; 4.5 AEROSOL RESPIRATORY (INHALATION) at 05:39

## 2018-01-26 RX ADMIN — Medication 2.5 MILLIGRAM(S): at 05:39

## 2018-01-26 RX ADMIN — Medication 1 SPRAY(S): at 05:39

## 2018-01-26 RX ADMIN — BUDESONIDE AND FORMOTEROL FUMARATE DIHYDRATE 2 PUFF(S): 160; 4.5 AEROSOL RESPIRATORY (INHALATION) at 18:00

## 2018-01-26 RX ADMIN — Medication 1 SPRAY(S): at 21:20

## 2018-01-26 RX ADMIN — OXYCODONE HYDROCHLORIDE 10 MILLIGRAM(S): 5 TABLET ORAL at 10:03

## 2018-01-26 RX ADMIN — Medication 1 APPLICATION(S): at 21:20

## 2018-01-26 RX ADMIN — SODIUM POLYSTYRENE SULFONATE 30 GRAM(S): 4.1 POWDER, FOR SUSPENSION ORAL at 19:08

## 2018-01-26 RX ADMIN — OXYCODONE HYDROCHLORIDE 10 MILLIGRAM(S): 5 TABLET ORAL at 11:03

## 2018-01-26 RX ADMIN — Medication 1 MILLIGRAM(S): at 00:17

## 2018-01-26 RX ADMIN — Medication 325 MILLIGRAM(S): at 12:01

## 2018-01-26 RX ADMIN — WARFARIN SODIUM 5 MILLIGRAM(S): 2.5 TABLET ORAL at 21:19

## 2018-01-26 RX ADMIN — OXYCODONE HYDROCHLORIDE 10 MILLIGRAM(S): 5 TABLET ORAL at 22:47

## 2018-01-26 RX ADMIN — Medication 0.25 MILLIGRAM(S): at 12:01

## 2018-01-26 RX ADMIN — CEFTRIAXONE 100 GRAM(S): 500 INJECTION, POWDER, FOR SOLUTION INTRAMUSCULAR; INTRAVENOUS at 22:58

## 2018-01-26 RX ADMIN — Medication 81 MILLIGRAM(S): at 12:01

## 2018-01-26 RX ADMIN — ENOXAPARIN SODIUM 120 MILLIGRAM(S): 100 INJECTION SUBCUTANEOUS at 18:00

## 2018-01-26 RX ADMIN — Medication 650 MILLIGRAM(S): at 03:35

## 2018-01-26 RX ADMIN — SIMVASTATIN 20 MILLIGRAM(S): 20 TABLET, FILM COATED ORAL at 21:20

## 2018-01-26 RX ADMIN — Medication 1 SPRAY(S): at 14:02

## 2018-01-26 RX ADMIN — ENOXAPARIN SODIUM 120 MILLIGRAM(S): 100 INJECTION SUBCUTANEOUS at 05:39

## 2018-01-26 RX ADMIN — Medication 1 APPLICATION(S): at 12:02

## 2018-01-26 RX ADMIN — Medication 650 MILLIGRAM(S): at 02:40

## 2018-01-26 NOTE — PROGRESS NOTE ADULT - SUBJECTIVE AND OBJECTIVE BOX
Patient is a 77y old  Female who presents with a chief complaint of lethargy (23 Jan 2018 21:09)      SUBJECTIVE / OVERNIGHT EVENTS:    sciatica acting up in her right leg  no acute SOB or cough    Vital Signs Last 24 Hrs  T(C): 36.6 (26 Jan 2018 08:21), Max: 37.2 (25 Jan 2018 09:45)  T(F): 97.9 (26 Jan 2018 08:21), Max: 99 (25 Jan 2018 09:45)  HR: 67 (26 Jan 2018 08:21) (67 - 87)  BP: 120/74 (26 Jan 2018 08:21) (107/51 - 148/84)  BP(mean): --  RR: 18 (26 Jan 2018 08:21) (17 - 18)  SpO2: 97% (26 Jan 2018 08:21) (95% - 98%)  I&O's Summary    25 Jan 2018 07:01  -  26 Jan 2018 07:00  --------------------------------------------------------  IN: 1205 mL / OUT: 1100 mL / NET: 105 mL        GENERAL: NAD, AAOx3  HEAD:  Atraumatic, Normocephalic  EYES: EOMI  NECK: Supple, No JVD, No LAD  CHEST/LUNG: Clear to auscultation bilaterally; No wheeze  HEART: Regular rate and rhythm; No murmurs, rubs, or gallops  ABDOMEN: Soft, Nontender, Nondistended; Bowel sounds present  EXTREMITIES:  2+ Peripheral Pulses, (+)LE varicosities; no LE edema   SKIN: No rashes or lesions  NEURO: nonfocal CN/motor/sensory/reflexes    LABS:                        7.3    10.09 )-----------( 194      ( 25 Jan 2018 07:54 )             23.5     01-25    137  |  101  |  28<H>  ----------------------------<  97  5.6<H>   |  25  |  1.14    Ca    8.2<L>      25 Jan 2018 08:00  Mg     2.0     01-24    TPro  6.4  /  Alb  2.2<L>  /  TBili  0.3  /  DBili  x   /  AST  17  /  ALT  9<L>  /  AlkPhos  71  01-25    PT/INR - ( 25 Jan 2018 16:30 )   PT: 16.8 sec;   INR: 1.54 ratio           CAPILLARY BLOOD GLUCOSE                RADIOLOGY & ADDITIONAL TESTS:    Imaging Personally Reviewed:  [x] YES  [ ] NO    Consultant(s) Notes Reviewed:  [x] YES  [ ] NO      MEDICATIONS  (STANDING):  aspirin enteric coated 81 milliGRAM(s) Oral daily  buDESOnide 160 MICROgram(s)/formoterol 4.5 MICROgram(s) Inhaler 2 Puff(s) Inhalation two times a day  cefTRIAXone   IVPB 2 Gram(s) IV Intermittent every 24 hours  collagenase Ointment 1 Application(s) Topical two times a day  enoxaparin Injectable 120 milliGRAM(s) SubCutaneous two times a day  ferrous    sulfate 325 milliGRAM(s) Oral daily  lactobacillus acidophilus 1 Tablet(s) Oral daily  melatonin 1 milliGRAM(s) Oral at bedtime  multivitamin 1 Tablet(s) Oral daily  pantoprazole    Tablet 40 milliGRAM(s) Oral before breakfast  predniSONE   Tablet 2.5 milliGRAM(s) Oral daily  simvastatin 20 milliGRAM(s) Oral at bedtime  sodium chloride 0.65% Nasal 1 Spray(s) Both Nostrils three times a day    MEDICATIONS  (PRN):  acetaminophen    Suspension 650 milliGRAM(s) Oral every 6 hours PRN For Temp greater than 38 C (100.4 F)  acetaminophen   Tablet. 650 milliGRAM(s) Oral every 6 hours PRN Mild Pain (1 - 3)  ALPRAZolam 0.25 milliGRAM(s) Oral daily PRN anxiety  melatonin 3 milliGRAM(s) Oral at bedtime PRN Insomnia  oxyCODONE    IR 10 milliGRAM(s) Oral every 6 hours PRN Moderate Pain (4 - 6)      Care Discussed with Consultants/Other Providers [x] YES  [ ] NO    HEALTH ISSUES - PROBLEM Dx:  DVT (deep venous thrombosis): DVT (deep venous thrombosis)  Obesity: Obesity  Prophylactic measure: Prophylactic measure  CAD (coronary artery disease): CAD (coronary artery disease)  Atelectasis of both lungs: Atelectasis of both lungs  Hypoxemia: Hypoxemia  Asthma: Asthma  SOB (shortness of breath): SOB (shortness of breath)  Metabolic encephalopathy: Metabolic encephalopathy  Fever: Fever  Hypoxia: Hypoxia  Status post total knee replacement, unspecified laterality: Status post total knee replacement, unspecified laterality  YONATAN (acute kidney injury): YONATAN (acute kidney injury)  Elevated troponin: Elevated troponin  Gastroesophageal reflux disease, esophagitis presence not specified: Gastroesophageal reflux disease, esophagitis presence not specified  Asthma, unspecified asthma severity, unspecified whether complicated, unspecified whether persistent: Asthma, unspecified asthma severity, unspecified whether complicated, unspecified whether persistent  Polymyalgia: Polymyalgia  Aortic stenosis, severe: Aortic stenosis, severe  Pneumonia due to infectious organism, unspecified laterality, unspecified part of lung: Pneumonia due to infectious organism, unspecified laterality, unspecified part of lung

## 2018-01-26 NOTE — PROGRESS NOTE ADULT - ASSESSMENT
76 yo F with a h/o AS s/p TAVR and PPM 1 year ago, asthma on Advair, obesity, CAD, PMR on chronic prednisone at 2.5 mg, remote h/o b/l TKR, recent admission to [facility] in Dec for strept sanguinous bacteremia with knee seeding, s/o right knee explant on 12/23 and on long term Ceftriaxone therapy. Discharged to SNF on 1/2/18. Brought to ED on 1/23 for low grade fever and found to be hypoxemic to 91%, improved with supplemental O2.  CT chest non-con: b/l very small pleural effusions with adjacent atelectasis.   V/Q: low probability for PE  WBC 14.6 and YONATAN  Antibiotics switched to Cefepime   Inhalers: Symbicort and Duonebs  Denies any symptoms of cough, shortness of breath, chest tightness or wheezing. Denies nasal congestion. Has been sedentary for the majority of the past month 2/2 illness and recent right knee surgery.  Tmax 99.5    On exam, O2 sats range 89-91% on RA, improves to 94% with deep breathing techniques.    A/P: Hypoxemia - likely from a combination of MYAH/OHS, bibasilar atelectasis. At this time, no bronchospasm on exam and has no respiratory symptoms, I do not believe this is pneumonia. On VBG with mild hypercapnia, though may be underestimating 2/2 YONATAN. ABG would be ideal to better assess this.   However agree with repeating TTE to evaluate valves and chambers for shunt as well as LE dopplers to r/o DVT,  Continue with supplemental O2, goal sats in the low 90s. Out of bed to chair  Incentive spirometer hourly - reviewed technique with patient with teachback.   C/W Symbicort and bronchodilators as needed.  Blood cultures negative to day  DVT prophylaxis    Thank you for the consult. 76 yo F with a h/o AS s/p TAVR and PPM 1 year ago, asthma on Advair, obesity, CAD, PMR on chronic prednisone at 2.5 mg, remote h/o b/l TKR, recent admission to [facility] in Dec for strept sanguinous bacteremia with knee seeding, s/o right knee explant on 12/23 and on long term Ceftriaxone therapy. Discharged to SNF on 1/2/18. Brought to ED on 1/23 for low grade fever and found to be hypoxemic to 91%, improved with supplemental O2.  CT chest non-con: b/l very small pleural effusions with adjacent atelectasis.   V/Q: low probability for PE---pos doppler c/w DVT  WBC 14.6 and YONATAN  Antibiotics switched to Cefepime   Inhalers: Symbicort and Duonebs  Denies any symptoms of cough, shortness of breath, chest tightness or wheezing. Denies nasal congestion. Has been sedentary for the majority of the past month 2/2 illness and recent right knee surgery.  Tmax 99.5    On exam, O2 sats range 89-91% on RA, improves to 94% with deep breathing techniques.    A/P: Hypoxemia - likely from a combination of MYAH/OHS, bibasilar atelectasis. At this time, no bronchospasm on exam and has no respiratory symptoms, I do not believe this is pneumonia.   However agree with repeating TTE to evaluate valves and chambers for shunt as well as LE dopplers c/w DVT and pt has likely had PE inspite of VQ results  Continue with supplemental O2, goal sats in the low 90s. Out of bed to chair  Incentive spirometer hourly - reviewed technique with patient with teachback.   C/W Symbicort and bronchodilators as needed.  Blood cultures negative to day  DVT prophylaxis    Thank you for the consult.

## 2018-01-26 NOTE — PROGRESS NOTE ADULT - ASSESSMENT
A/P: 77F s/p Stage 1 Revision TKA   pain control  PWB RLE  Therapeutic lovenox for anticoagulation  d/w family consideration for IVF Filter for perioperative management of DVTs  FU Labs  dc planning

## 2018-01-26 NOTE — CONSULT NOTE ADULT - SUBJECTIVE AND OBJECTIVE BOX
GENERAL SURGERY CONSULT NOTE    Patient is a 77y old  Female who presents with a chief complaint of lethargy (23 Jan 2018 21:09)      HPI:  77 f with extensive PMH including CAD s/p HERBERT, severe AS, h/o MVR, PMR on chronic steroids, asthma, OA, s/p PPM, s/p TKR with recent joint infection s/p explant and abx spacer in rehab for 3 wks now admitted (1/23) with fever, hypoxia, and SIRS. Pt is planning to have re-operation in March. Pt was found to have a RLE DVT yesterday, is on therapeutic lovenox, and orthopedic surgery is requesting an IVC filter.       PAST MEDICAL & SURGICAL HISTORY:  CAD (coronary artery disease): HERBERT to LAD 11/2016  Aortic stenosis, severe  Uncomplicated asthma, unspecified asthma severity  Polymyalgia  Lumbar disc disease with radiculopathy  Spider veins  Anxiety  Severe aortic stenosis by prior echocardiogram: 2013 and  11/2016  Dysphagia  Morbid obesity with BMI of 50.0-59.9, adult  Macular degeneration  Hiatal hernia  GERD (gastroesophageal reflux disease)  Sciatica  Osteoarthritis  H/O cardiac catheterization: 11/29/16 HERBERT placed on LAD  H/O endoscopy: with dilatation of esophageal stricture 2013  S/P cataract surgery: x2; 3-4yr ago  S/P gastroplasty: 28 yrs ago  S/P cholecystectomy: more than 15 years ago  S/P total knee replacement: left, 15yr ago  S/P total knee replacement: right, 12yr ago  S/P hysterectomy: 24yr ago    [  ] No significant past history as reviewed with the patient and family    FAMILY HISTORY:  No pertinent family history in first degree relatives    [  ] Family history not pertinent as reviewed with the patient and family    SOCIAL HISTORY:    MEDICATIONS  (STANDING):  aspirin enteric coated 81 milliGRAM(s) Oral daily  buDESOnide 160 MICROgram(s)/formoterol 4.5 MICROgram(s) Inhaler 2 Puff(s) Inhalation two times a day  cefTRIAXone   IVPB 2 Gram(s) IV Intermittent every 24 hours  collagenase Ointment 1 Application(s) Topical two times a day  enoxaparin Injectable 120 milliGRAM(s) SubCutaneous two times a day  ferrous    sulfate 325 milliGRAM(s) Oral daily  lactobacillus acidophilus 1 Tablet(s) Oral daily  melatonin 1 milliGRAM(s) Oral at bedtime  multivitamin 1 Tablet(s) Oral daily  pantoprazole    Tablet 40 milliGRAM(s) Oral before breakfast  predniSONE   Tablet 2.5 milliGRAM(s) Oral daily  simvastatin 20 milliGRAM(s) Oral at bedtime  sodium chloride 0.65% Nasal 1 Spray(s) Both Nostrils three times a day    MEDICATIONS  (PRN):  acetaminophen    Suspension 650 milliGRAM(s) Oral every 6 hours PRN For Temp greater than 38 C (100.4 F)  acetaminophen   Tablet. 650 milliGRAM(s) Oral every 6 hours PRN Mild Pain (1 - 3)  ALPRAZolam 0.25 milliGRAM(s) Oral daily PRN anxiety  melatonin 3 milliGRAM(s) Oral at bedtime PRN Insomnia  oxyCODONE    IR 10 milliGRAM(s) Oral every 6 hours PRN Moderate Pain (4 - 6)    Allergies    No Known Allergies    Intolerances    IV Contrast (Flushing (Skin); Nausea)      Vital Signs Last 24 Hrs  T(C): 36.6 (26 Jan 2018 08:21), Max: 37.2 (25 Jan 2018 22:54)  T(F): 97.9 (26 Jan 2018 08:21), Max: 98.9 (25 Jan 2018 22:54)  HR: 67 (26 Jan 2018 08:21) (67 - 87)  BP: 120/74 (26 Jan 2018 08:21) (107/51 - 148/84)  BP(mean): --  RR: 18 (26 Jan 2018 08:21) (17 - 18)  SpO2: 97% (26 Jan 2018 08:21) (95% - 98%)  Daily     Daily     Exam:  General: awake, alert, NAD  HEENT: NCAT, MMM  Resp: nonlabored  Chest: equal chest rise  Abd: soft, NT, ND  Ext: RLE in immobilizer, no tenderness or swelling to b/l LE  Neuro: intact                          8.3    9.25  )-----------( 195      ( 26 Jan 2018 09:34 )             26.6     01-26    141  |  102  |  28<H>  ----------------------------<  109<H>  5.7<H>   |  25  |  1.24    Ca    8.3<L>      26 Jan 2018 09:29  Mg     2.0     01-24    TPro  6.4  /  Alb  2.2<L>  /  TBili  0.3  /  DBili  x   /  AST  17  /  ALT  9<L>  /  AlkPhos  71  01-25    PT/INR - ( 26 Jan 2018 09:34 )   PT: 16.6 sec;   INR: 1.46 ratio               IMAGING STUDIES:  < from: [facility] Duplex Lower Ext Vein Scan, Bilat (01.25.18 @ 11:40) >  FINDINGS:    There is normal compressibility of the left common femoral, femoral and   popliteal veins. No leftcalf vein thrombosis is detected.    Doppler examination shows normal spontaneous and phasic flow within the   veins of the left lower extremity..    The right, femoral and femoral veins are patent and compressible. There   is thrombus within the right popliteal, posterior tibial, peroneal and   gastrocnemius veins. These vessels are noncompressible.    IMPRESSION: Positive deep venous thrombosis right lower extremity above   the knee. Positive deep venous thrombosis involving the right popliteal,   tibioperoneal trunk and right calf veins.     No evidence of bilateral lower extremity deep venous thrombosis.    < end of copied text >

## 2018-01-26 NOTE — PROGRESS NOTE ADULT - PROBLEM SELECTOR PLAN 1
Therapeutic lovenox  start coumadin  consulted vascular for retrievable IVC filter for upcoming rt hip procedure

## 2018-01-26 NOTE — PROGRESS NOTE ADULT - PROBLEM SELECTOR PLAN 3
No clearcut source except perhaps DVT or atelectasis  Doubt her rt knee as source of infection  on ceftriaxone empirically  appreciate ID

## 2018-01-26 NOTE — PROGRESS NOTE ADULT - SUBJECTIVE AND OBJECTIVE BOX
CHIEF COMPLAINT:    Interval Events:    REVIEW OF SYSTEMS:  Constitutional: No fevers or chills. No weight loss. No fatigue or generalized malaise.  Eyes: No itching or discharge from the eyes  ENT: No ear pain. No ear discharge. No nasal congestion. No post nasal drip. No epistaxis. No throat pain. No sore throat. No difficulty swallowing.   CV: No chest pain. No palpitations. No lightheadedness or dizziness.   Resp: No dyspnea at rest. No dyspnea on exertion. No orthopnea. No wheezing. No cough. No stridor. No sputum production. No chest pain with respiration.  GI: No nausea. No vomiting. No diarrhea.  MSK: No joint pain or pain in any extremities  Integumentary: No skin lesions. No pedal edema.  Neurological: No gross motor weakness. No sensory changes.  [ ] All other systems negative  [ ] Unable to assess ROS because ________    OBJECTIVE:  ICU Vital Signs Last 24 Hrs  T(C): 36.8 (26 Jan 2018 02:37), Max: 38.1 (25 Jan 2018 08:18)  T(F): 98.2 (26 Jan 2018 02:37), Max: 100.6 (25 Jan 2018 08:18)  HR: 84 (26 Jan 2018 02:37) (72 - 87)  BP: 139/81 (26 Jan 2018 02:37) (107/51 - 148/84)  BP(mean): --  ABP: --  ABP(mean): --  RR: 18 (26 Jan 2018 02:37) (17 - 18)  SpO2: 97% (26 Jan 2018 02:37) (95% - 98%)        01-25 @ 07:01  -  01-26 @ 05:59  --------------------------------------------------------  IN: 1205 mL / OUT: 1100 mL / NET: 105 mL      CAPILLARY BLOOD GLUCOSE          PHYSICAL EXAM:  General: Awake, alert, oriented X 3.   HEENT: Atraumatic, normocephalic.                 Mallampatti Grade                 No nasal congestion.                No tonsillar or pharyngeal exudates.  Lymph Nodes: No palpable lymphadenopathy  Neck: No JVD. No carotid bruit.   Respiratory: Normal chest expansion                         Normal percussion                         Normal and equal air entry                         No wheeze, rhonchi or rales.  Cardiovascular: S1 S2 normal. No murmurs, rubs or gallops.   Abdomen: Soft, non-tender, non-distended. No organomegaly.  Extremities: Warm to touch. Peripheral pulse palpable. No pedal edema.   Skin: No rashes or skin lesions  Neurological: Motor and sensory examination equal and normal in all four extremities.  Psychiatry: Appropriate mood and affect.    HOSPITAL MEDICATIONS:  MEDICATIONS  (STANDING):  aspirin enteric coated 81 milliGRAM(s) Oral daily  buDESOnide 160 MICROgram(s)/formoterol 4.5 MICROgram(s) Inhaler 2 Puff(s) Inhalation two times a day  cefTRIAXone   IVPB 2 Gram(s) IV Intermittent every 24 hours  collagenase Ointment 1 Application(s) Topical two times a day  enoxaparin Injectable 120 milliGRAM(s) SubCutaneous two times a day  ferrous    sulfate 325 milliGRAM(s) Oral daily  lactobacillus acidophilus 1 Tablet(s) Oral daily  melatonin 1 milliGRAM(s) Oral at bedtime  multivitamin 1 Tablet(s) Oral daily  pantoprazole    Tablet 40 milliGRAM(s) Oral before breakfast  predniSONE   Tablet 2.5 milliGRAM(s) Oral daily  simvastatin 20 milliGRAM(s) Oral at bedtime  sodium chloride 0.65% Nasal 1 Spray(s) Both Nostrils three times a day    MEDICATIONS  (PRN):  acetaminophen    Suspension 650 milliGRAM(s) Oral every 6 hours PRN For Temp greater than 38 C (100.4 F)  acetaminophen   Tablet. 650 milliGRAM(s) Oral every 6 hours PRN Mild Pain (1 - 3)  ALPRAZolam 0.25 milliGRAM(s) Oral daily PRN anxiety  melatonin 3 milliGRAM(s) Oral at bedtime PRN Insomnia  oxyCODONE    IR 5 milliGRAM(s) Oral every 6 hours PRN Moderate Pain (4 - 6)      LABS:                        7.3    10.09 )-----------( 194      ( 25 Jan 2018 07:54 )             23.5     01-25    137  |  101  |  28<H>  ----------------------------<  97  5.6<H>   |  25  |  1.14    Ca    8.2<L>      25 Jan 2018 08:00  Mg     2.0     01-24    TPro  6.4  /  Alb  2.2<L>  /  TBili  0.3  /  DBili  x   /  AST  17  /  ALT  9<L>  /  AlkPhos  71  01-25    PT/INR - ( 25 Jan 2018 16:30 )   PT: 16.8 sec;   INR: 1.54 ratio                   MICROBIOLOGY:     RADIOLOGY:  [ ] Reviewed and interpreted by me    Point of Care Ultrasound Findings:    PFT:    EKG: CHIEF COMPLAINT: right sided sciatica is bothering her--no cough or sob    Interval Events: OOB yesterday, doppler done    REVIEW OF SYSTEMS:  Constitutional: No fevers or chills. No weight loss. + fatigue or generalized malaise.  Eyes: No itching or discharge from the eyes  ENT: No ear pain. No ear discharge. No nasal congestion. No post nasal drip. No epistaxis. No throat pain. No sore throat. No difficulty swallowing.   CV: No chest pain. No palpitations. No lightheadedness or dizziness.   Resp: No dyspnea at rest. + dyspnea on exertion. No orthopnea. No wheezing. No cough. No stridor. No sputum production. No chest pain with respiration.  GI: No nausea. No vomiting. No diarrhea.  MSK: + joint pain or pain right sided extremities  Integumentary: No skin lesions. No pedal edema.  Neurological: No gross motor weakness. No sensory changes.  [+ ] All other systems negative  [ ] Unable to assess ROS because ________    OBJECTIVE:  ICU Vital Signs Last 24 Hrs  T(C): 36.8 (26 Jan 2018 02:37), Max: 38.1 (25 Jan 2018 08:18)  T(F): 98.2 (26 Jan 2018 02:37), Max: 100.6 (25 Jan 2018 08:18)  HR: 84 (26 Jan 2018 02:37) (72 - 87)  BP: 139/81 (26 Jan 2018 02:37) (107/51 - 148/84)  BP(mean): --  ABP: --  ABP(mean): --  RR: 18 (26 Jan 2018 02:37) (17 - 18)  SpO2: 97% (26 Jan 2018 02:37) (95% - 98%)        01-25 @ 07:01  -  01-26 @ 05:59  --------------------------------------------------------  IN: 1205 mL / OUT: 1100 mL / NET: 105 mL      CAPILLARY BLOOD GLUCOSE          PHYSICAL EXAM: very emotional over all in bed w/O2 in place  General: Awake, alert, oriented X 3.   HEENT: Atraumatic, normocephalic.                 Mallampatti Grade 3                No nasal congestion.                No tonsillar or pharyngeal exudates.  Lymph Nodes: No palpable lymphadenopathy  Neck: No JVD. No carotid bruit.   Respiratory: reduced chest expansion                         Normal percussion                         Normal and equal air entry                         No wheeze, rhonchi or rales.  Cardiovascular: S1 S2 normal. + murmurs, no rubs or gallops.   Abdomen: Soft, non-tender, non-distended. No organomegaly.  Extremities: Warm to touch. Peripheral pulse palpable. No pedal edema.  R leg braced  Skin: No rashes or skin lesions  Neurological: Motor and sensory examination equal and normal in all four extremities.  Psychiatry: Appropriate mood and affect.    HOSPITAL MEDICATIONS:  MEDICATIONS  (STANDING):  aspirin enteric coated 81 milliGRAM(s) Oral daily  buDESOnide 160 MICROgram(s)/formoterol 4.5 MICROgram(s) Inhaler 2 Puff(s) Inhalation two times a day  cefTRIAXone   IVPB 2 Gram(s) IV Intermittent every 24 hours  collagenase Ointment 1 Application(s) Topical two times a day  enoxaparin Injectable 120 milliGRAM(s) SubCutaneous two times a day  ferrous    sulfate 325 milliGRAM(s) Oral daily  lactobacillus acidophilus 1 Tablet(s) Oral daily  melatonin 1 milliGRAM(s) Oral at bedtime  multivitamin 1 Tablet(s) Oral daily  pantoprazole    Tablet 40 milliGRAM(s) Oral before breakfast  predniSONE   Tablet 2.5 milliGRAM(s) Oral daily  simvastatin 20 milliGRAM(s) Oral at bedtime  sodium chloride 0.65% Nasal 1 Spray(s) Both Nostrils three times a day    MEDICATIONS  (PRN):  acetaminophen    Suspension 650 milliGRAM(s) Oral every 6 hours PRN For Temp greater than 38 C (100.4 F)  acetaminophen   Tablet. 650 milliGRAM(s) Oral every 6 hours PRN Mild Pain (1 - 3)  ALPRAZolam 0.25 milliGRAM(s) Oral daily PRN anxiety  melatonin 3 milliGRAM(s) Oral at bedtime PRN Insomnia  oxyCODONE    IR 5 milliGRAM(s) Oral every 6 hours PRN Moderate Pain (4 - 6)      LABS:                        7.3    10.09 )-----------( 194      ( 25 Jan 2018 07:54 )             23.5     01-25    137  |  101  |  28<H>  ----------------------------<  97  5.6<H>   |  25  |  1.14    Ca    8.2<L>      25 Jan 2018 08:00  Mg     2.0     01-24    TPro  6.4  /  Alb  2.2<L>  /  TBili  0.3  /  DBili  x   /  AST  17  /  ALT  9<L>  /  AlkPhos  71  01-25    PT/INR - ( 25 Jan 2018 16:30 )   PT: 16.8 sec;   INR: 1.54 ratio                   MICROBIOLOGY:     RADIOLOGY:  < from: [facility] Duplex Lower Ext Vein Scan, Bilat (01.25.18 @ 11:40) >  NTERPRETATION:  CLINICAL INFORMATION: Status post right knee surgery.   Shortness breath and swelling bilaterally.    COMPARISON: None available.    TECHNIQUE: Duplex sonography of the BILATERAL LOWER extremities with   color and spectral Doppler, with and without compression.      FINDINGS:    There is normal compressibility of the left common femoral, femoral and   popliteal veins. No leftcalf vein thrombosis is detected.    Doppler examination shows normal spontaneous and phasic flow within the   veins of the left lower extremity..    The right, femoral and femoral veins are patent and compressible. There   is thrombus within the right popliteal, posterior tibial, peroneal and   gastrocnemius veins. These vessels are noncompressible.    IMPRESSION: Positive deep venous thrombosis right lower extremity above   the knee. Positive deep venous thrombosis involving the right popliteal,   tibioperoneal trunk and right calf veins.     No evidence of bilateral lower extremity deep venous thrombosis.    < end of copied text >    [ ] Reviewed and interpreted by me    Point of Care Ultrasound Findings:    PFT:    EKG:

## 2018-01-26 NOTE — CONSULT NOTE ADULT - ATTENDING COMMENTS
I agree with the above note on this patient. All pertinent films have been reviewed. Please refer to clinical documentation of the history, physical examinations, data summary, and both assessment and plan as documented above and with which I agree.    Nate Bass MD  Attending Orthopedic Surgeon
Seen ex'ed dw'ed res/fellow agree w aove  Acute DVT.  No phlegmasia  Pt on therapeurtic AC  Elevate leg  Compression stockings post op.  If AC not safe or complications occur- consider IVC filter

## 2018-01-26 NOTE — PROGRESS NOTE ADULT - ATTENDING COMMENTS
as above  optimize resp regimen w/ bronchodilators and Neb rx--OOB as able  ABX as per ID    Everett Kumar MD-Pulmonary   [phone] as above--Hypoxemia (obesity hypoventilation, PE, atelectasis, asthma, Cardiac disease)    optimize resp regimen w/ bronchodilators and Neb rx; AC initiated as pt now has DVT and likely PE--OOB as able  check echo and optimize cards regimen  ABX as per ID    Everett Kumar MD-Pulmonary   [phone]

## 2018-01-26 NOTE — PROVIDER CONTACT NOTE (OTHER) - ASSESSMENT
Pt c/o sciatica pain in back to R leg  pain medication was provided over night with temporary relief, but patient states she needs something to better control this morning

## 2018-01-26 NOTE — PROGRESS NOTE ADULT - SUBJECTIVE AND OBJECTIVE BOX
CC: f/u for fever, S sanguinis bacteremia    Patient reports in better spirits today, pain controlled, no GI Sxs    REVIEW OF SYSTEMS:  All other review of systems negative (Comprehensive ROS)    Antimicrobials Day # 34/42:  cefTRIAXone   IVPB 2 Gram(s) IV Intermittent every 24 hours    Other Medications Reviewed    Vital Signs Last 24 Hrs  T(F): 98.5 (26 Jan 2018 12:03), Max: 98.9 (25 Jan 2018 22:54)  HR: 66 (26 Jan 2018 12:03) (66 - 84)  BP: 144/78 (26 Jan 2018 12:03) (107/51 - 148/84)  BP(mean): --  RR: 18 (26 Jan 2018 12:03) (17 - 18)  SpO2: 98% (26 Jan 2018 12:03) (95% - 98%)    PHYSICAL EXAM:  General: alert, no acute distress  Eyes:  anicteric, no conjunctival injection, no discharge  Oropharynx: no lesions or injection 	  Neck: supple, without adenopathy  Lungs: clear to auscultation  Heart: regular rate and rhythm; no murmur, rubs or gallops  Abdomen: soft, nondistended, nontender, without mass or organomegaly  Skin: no lesions  Extremities: R knee incision intact except for a small area of breakdown distally, no cellulitis or drainage.  R PICC site clean  Neurologic: alert, oriented, moves all extremities    LAB RESULTS:                        8.3    9.25  )-----------( 195      ( 26 Jan 2018 09:34 )             26.6   01-26    141  |  102  |  28<H>  ----------------------------<  109<H>  5.7<H>   |  25  |  1.24    Ca    8.3<L>      26 Jan 2018 09:29  Mg     2.0     01-24    TPro  6.4  /  Alb  2.2<L>  /  TBili  0.3  /  DBili  x   /  AST  17  /  ALT  9<L>  /  AlkPhos  71  01-25      MICROBIOLOGY:  RECENT CULTURES:  01-23 @ 23:08 .Blood Blood-Peripheral     No growth to date.      01-23 @ 16:09 .Urine Clean Catch (Midstream)     No growth      01-23 @ 14:49 .Blood Blood-Peripheral     No growth to date.          RADIOLOGY REVIEWED:  LE dopplers R DVT  Chest CT atelectasis    Transthoracic Echocardiogram (01.25.18 @ 14:10) >  1. Mitral annular calcification. Mild mitral regurgitation.  2. Transcatheter aortic valve replacement. The valve is  well seated.  Peak transaortic valve gradient equals 36 mm  Hg, mean transaortic valve gradient equals 19 mm Hg, which  is probably normal in the presence of a transcatheter  aortic valve replacement. No valvular aortic valve  regurgitation and minimal paravalvular aortic  regurgitation.  3. Severely dilated left atrium.  LA volume index = 50  cc/m2.  4. Normal left ventricular systolic function. The basal  inferolateral wall, and the basal inferior wall are mildly  hypokinetic.  5. Moderate diastolic dysfunction (Stage II).  6. Right ventricular enlargement with normal right  ventricular systolic function. A device wire is noted in  the right heart.  7. Tricuspid valve not well visualized. Moderate tricuspid  regurgitation.

## 2018-01-26 NOTE — PROGRESS NOTE ADULT - ASSESSMENT
78 yo female with h/o TAVR, PPM, PMR, who is on day 34/42 treatment for Strep sanguinis bacteremia and R knee PJI- s/p VIOLA/spacer  No signs of an active- right knee incision is healing well.  DVT noted and likely link to most recent events  No evidence of pneumonia  No obvious new infection  Latest Cxs negative  TTE stable findings  Now Afebrile    Plan:  Continue present regimen- CTX- to complete 6 wks as planned; I would favor stepdown to po regimen once IV finished- eg- Ceftin 500 mg po BID  Anticoagulation and or IVC filter per medicine  Follow temps and CBC/diff

## 2018-01-26 NOTE — CONSULT NOTE ADULT - ASSESSMENT
76yo F with R knee hardware infection s/p removal and placement of spacer with plan for re-op in march now with R popliteal, PT, perineal, and gastrocnemius DVT on therapeutic lovenox. She will need anticoagulation held prior to reoperation in March - requesting IVC filter.     - will discuss scheduling with Dr. Berg   - discussed with ortho    Vascular surgery  [id number] 78yo F with R knee hardware infection s/p removal and placement of spacer with plan for re-op in march now with R popliteal, PT, perineal, and gastrocnemius DVT on therapeutic lovenox. She will need anticoagulation held prior to reoperation in March - requesting IVC filter.     - will discuss scheduling with Dr. Berg   - if filter needed expeditiously, please call IR  - discussed with ortho    Vascular surgery  [phone]

## 2018-01-26 NOTE — PROGRESS NOTE ADULT - PROBLEM SELECTOR PLAN 7
DVT prophlaxis:  subcutaneous lovenox or heparin as per primary team  sequential teds stockings  early ambulation GI prophylaxis:  PPI pre breakfast  aspiration precautions-all meals are to OOB as able    early ambulation

## 2018-01-27 LAB
ALBUMIN SERPL ELPH-MCNC: 2.4 G/DL — LOW (ref 3.3–5)
ALP SERPL-CCNC: 96 U/L — SIGNIFICANT CHANGE UP (ref 40–120)
ALT FLD-CCNC: 10 U/L — SIGNIFICANT CHANGE UP (ref 10–45)
ANION GAP SERPL CALC-SCNC: 13 MMOL/L — SIGNIFICANT CHANGE UP (ref 5–17)
APTT BLD: 45 SEC — HIGH (ref 27.5–37.4)
AST SERPL-CCNC: 18 U/L — SIGNIFICANT CHANGE UP (ref 10–40)
BILIRUB SERPL-MCNC: <0.2 MG/DL — SIGNIFICANT CHANGE UP (ref 0.2–1.2)
BUN SERPL-MCNC: 25 MG/DL — HIGH (ref 7–23)
CALCIUM SERPL-MCNC: 8.9 MG/DL — SIGNIFICANT CHANGE UP (ref 8.4–10.5)
CHLORIDE SERPL-SCNC: 102 MMOL/L — SIGNIFICANT CHANGE UP (ref 96–108)
CO2 SERPL-SCNC: 25 MMOL/L — SIGNIFICANT CHANGE UP (ref 22–31)
CREAT SERPL-MCNC: 1.05 MG/DL — SIGNIFICANT CHANGE UP (ref 0.5–1.3)
GLUCOSE BLDC GLUCOMTR-MCNC: 101 MG/DL — HIGH (ref 70–99)
GLUCOSE BLDC GLUCOMTR-MCNC: 101 MG/DL — HIGH (ref 70–99)
GLUCOSE BLDC GLUCOMTR-MCNC: 145 MG/DL — HIGH (ref 70–99)
GLUCOSE SERPL-MCNC: 81 MG/DL — SIGNIFICANT CHANGE UP (ref 70–99)
HCT VFR BLD CALC: 27.7 % — LOW (ref 34.5–45)
HGB BLD-MCNC: 8.5 G/DL — LOW (ref 11.5–15.5)
INR BLD: 1.9 RATIO — HIGH (ref 0.88–1.16)
MCHC RBC-ENTMCNC: 28 PG — SIGNIFICANT CHANGE UP (ref 27–34)
MCHC RBC-ENTMCNC: 30.7 GM/DL — LOW (ref 32–36)
MCV RBC AUTO: 91.1 FL — SIGNIFICANT CHANGE UP (ref 80–100)
OB PNL STL: NEGATIVE — SIGNIFICANT CHANGE UP
PLATELET # BLD AUTO: 197 K/UL — SIGNIFICANT CHANGE UP (ref 150–400)
POTASSIUM SERPL-MCNC: 5.7 MMOL/L — HIGH (ref 3.5–5.3)
POTASSIUM SERPL-SCNC: 5.7 MMOL/L — HIGH (ref 3.5–5.3)
PROT SERPL-MCNC: 6.7 G/DL — SIGNIFICANT CHANGE UP (ref 6–8.3)
PROTHROM AB SERPL-ACNC: 21.7 SEC — HIGH (ref 10–13.1)
RBC # BLD: 3.04 M/UL — LOW (ref 3.8–5.2)
RBC # FLD: 16.7 % — HIGH (ref 10.3–14.5)
SODIUM SERPL-SCNC: 140 MMOL/L — SIGNIFICANT CHANGE UP (ref 135–145)
WBC # BLD: 8.12 K/UL — SIGNIFICANT CHANGE UP (ref 3.8–10.5)
WBC # FLD AUTO: 8.12 K/UL — SIGNIFICANT CHANGE UP (ref 3.8–10.5)

## 2018-01-27 PROCEDURE — 99233 SBSQ HOSP IP/OBS HIGH 50: CPT | Mod: GC

## 2018-01-27 PROCEDURE — 99232 SBSQ HOSP IP/OBS MODERATE 35: CPT | Mod: GC

## 2018-01-27 RX ORDER — OXYCODONE HYDROCHLORIDE 5 MG/1
5 TABLET ORAL ONCE
Qty: 0 | Refills: 0 | Status: DISCONTINUED | OUTPATIENT
Start: 2018-01-27 | End: 2018-01-27

## 2018-01-27 RX ORDER — INSULIN LISPRO 100/ML
10 VIAL (ML) SUBCUTANEOUS ONCE
Qty: 0 | Refills: 0 | Status: COMPLETED | OUTPATIENT
Start: 2018-01-27 | End: 2018-01-27

## 2018-01-27 RX ORDER — DEXTROSE 50 % IN WATER 50 %
25 SYRINGE (ML) INTRAVENOUS ONCE
Qty: 0 | Refills: 0 | Status: COMPLETED | OUTPATIENT
Start: 2018-01-27 | End: 2018-01-27

## 2018-01-27 RX ORDER — WARFARIN SODIUM 2.5 MG/1
5 TABLET ORAL ONCE
Qty: 0 | Refills: 0 | Status: COMPLETED | OUTPATIENT
Start: 2018-01-27 | End: 2018-01-27

## 2018-01-27 RX ADMIN — Medication 1 SPRAY(S): at 06:24

## 2018-01-27 RX ADMIN — Medication 1 SPRAY(S): at 13:34

## 2018-01-27 RX ADMIN — Medication 1 SPRAY(S): at 21:43

## 2018-01-27 RX ADMIN — Medication 1 APPLICATION(S): at 22:05

## 2018-01-27 RX ADMIN — Medication 1 TABLET(S): at 13:33

## 2018-01-27 RX ADMIN — Medication 2.5 MILLIGRAM(S): at 06:25

## 2018-01-27 RX ADMIN — ENOXAPARIN SODIUM 120 MILLIGRAM(S): 100 INJECTION SUBCUTANEOUS at 17:50

## 2018-01-27 RX ADMIN — OXYCODONE HYDROCHLORIDE 5 MILLIGRAM(S): 5 TABLET ORAL at 21:43

## 2018-01-27 RX ADMIN — Medication 1 APPLICATION(S): at 08:12

## 2018-01-27 RX ADMIN — BUDESONIDE AND FORMOTEROL FUMARATE DIHYDRATE 2 PUFF(S): 160; 4.5 AEROSOL RESPIRATORY (INHALATION) at 06:24

## 2018-01-27 RX ADMIN — CEFTRIAXONE 100 GRAM(S): 500 INJECTION, POWDER, FOR SOLUTION INTRAMUSCULAR; INTRAVENOUS at 21:43

## 2018-01-27 RX ADMIN — SIMVASTATIN 20 MILLIGRAM(S): 20 TABLET, FILM COATED ORAL at 21:43

## 2018-01-27 RX ADMIN — Medication 25 MILLILITER(S): at 00:18

## 2018-01-27 RX ADMIN — Medication 325 MILLIGRAM(S): at 13:35

## 2018-01-27 RX ADMIN — WARFARIN SODIUM 5 MILLIGRAM(S): 2.5 TABLET ORAL at 21:43

## 2018-01-27 RX ADMIN — Medication 0.25 MILLIGRAM(S): at 08:25

## 2018-01-27 RX ADMIN — Medication 81 MILLIGRAM(S): at 13:33

## 2018-01-27 RX ADMIN — OXYCODONE HYDROCHLORIDE 5 MILLIGRAM(S): 5 TABLET ORAL at 22:48

## 2018-01-27 RX ADMIN — Medication 1 TABLET(S): at 13:34

## 2018-01-27 RX ADMIN — Medication 1 MILLIGRAM(S): at 22:47

## 2018-01-27 RX ADMIN — ENOXAPARIN SODIUM 120 MILLIGRAM(S): 100 INJECTION SUBCUTANEOUS at 06:24

## 2018-01-27 RX ADMIN — PANTOPRAZOLE SODIUM 40 MILLIGRAM(S): 20 TABLET, DELAYED RELEASE ORAL at 06:24

## 2018-01-27 RX ADMIN — Medication 10 UNIT(S): at 00:18

## 2018-01-27 RX ADMIN — BUDESONIDE AND FORMOTEROL FUMARATE DIHYDRATE 2 PUFF(S): 160; 4.5 AEROSOL RESPIRATORY (INHALATION) at 17:50

## 2018-01-27 NOTE — PROGRESS NOTE ADULT - PROBLEM SELECTOR PLAN 7
GI prophylaxis:  PPI pre breakfast  aspiration precautions-all meals are to OOB as able    early ambulation

## 2018-01-27 NOTE — PROGRESS NOTE ADULT - SUBJECTIVE AND OBJECTIVE BOX
CHIEF COMPLAINT:     Interval Events:    REVIEW OF SYSTEMS:  Constitutional:   Eyes:  ENT:  CV:  Resp:  GI:  :  MSK:  Integumentary:  Neurological:  Psychiatric:  Endocrine:  Hematologic/Lymphatic:  Allergic/Immunologic:  [ ] All other systems negative  [ ] Unable to assess ROS because ________    OBJECTIVE:  ICU Vital Signs Last 24 Hrs  T(C): 36.5 (27 Jan 2018 20:08), Max: 37.7 (26 Jan 2018 20:32)  T(F): 97.7 (27 Jan 2018 20:08), Max: 99.9 (26 Jan 2018 20:32)  HR: 84 (27 Jan 2018 20:08) (77 - 84)  BP: 122/66 (27 Jan 2018 20:08) (106/61 - 146/82)  BP(mean): --  ABP: --  ABP(mean): --  RR: 18 (27 Jan 2018 20:08) (18 - 18)  SpO2: 90% (27 Jan 2018 20:08) (90% - 97%)        01-26 @ 07:01 - 01-27 @ 07:00  --------------------------------------------------------  IN: 795 mL / OUT: 1000 mL / NET: -205 mL    01-27 @ 07:01 - 01-27 @ 20:15  --------------------------------------------------------  IN: 720 mL / OUT: 700 mL / NET: 20 mL      CAPILLARY BLOOD GLUCOSE      POCT Blood Glucose.: 101 mg/dL (27 Jan 2018 12:07)      PHYSICAL EXAM:  General:   HEENT:   Lymph Nodes:  Neck:   Respiratory:   Cardiovascular:   Abdomen:   Extremities:   Skin:   Neurological:  Psychiatry:    HOSPITAL MEDICATIONS:  MEDICATIONS  (STANDING):  aspirin enteric coated 81 milliGRAM(s) Oral daily  buDESOnide 160 MICROgram(s)/formoterol 4.5 MICROgram(s) Inhaler 2 Puff(s) Inhalation two times a day  cefTRIAXone   IVPB 2 Gram(s) IV Intermittent every 24 hours  collagenase Ointment 1 Application(s) Topical two times a day  enoxaparin Injectable 120 milliGRAM(s) SubCutaneous two times a day  ferrous    sulfate 325 milliGRAM(s) Oral daily  lactobacillus acidophilus 1 Tablet(s) Oral daily  melatonin 1 milliGRAM(s) Oral at bedtime  multivitamin 1 Tablet(s) Oral daily  pantoprazole    Tablet 40 milliGRAM(s) Oral before breakfast  predniSONE   Tablet 2.5 milliGRAM(s) Oral daily  simvastatin 20 milliGRAM(s) Oral at bedtime  sodium chloride 0.65% Nasal 1 Spray(s) Both Nostrils three times a day  warfarin 5 milliGRAM(s) Oral once    MEDICATIONS  (PRN):  acetaminophen    Suspension 650 milliGRAM(s) Oral every 6 hours PRN For Temp greater than 38 C (100.4 F)  acetaminophen   Tablet. 650 milliGRAM(s) Oral every 6 hours PRN Mild Pain (1 - 3)  ALPRAZolam 0.25 milliGRAM(s) Oral daily PRN anxiety  melatonin 3 milliGRAM(s) Oral at bedtime PRN Insomnia  oxyCODONE    IR 10 milliGRAM(s) Oral every 6 hours PRN Moderate Pain (4 - 6)      LABS:                        8.5    8.12  )-----------( 197      ( 27 Jan 2018 09:02 )             27.7     01-27    140  |  102  |  25<H>  ----------------------------<  81  5.7<H>   |  25  |  1.05    Ca    8.9      27 Jan 2018 09:13    TPro  6.7  /  Alb  2.4<L>  /  TBili  <0.2  /  DBili  x   /  AST  18  /  ALT  10  /  AlkPhos  96  01-27    PT/INR - ( 27 Jan 2018 09:02 )   PT: 21.7 sec;   INR: 1.90 ratio         PTT - ( 27 Jan 2018 09:02 )  PTT:45.0 sec          MICROBIOLOGY:     RADIOLOGY:  b/l small effusions with atelectasis  [ ] Reviewed and interpreted by me    PULMONARY FUNCTION TESTS:    EKG: CHIEF COMPLAINT:- no cough or sob    Interval Events: OOB yesterday, doppler done    REVIEW OF SYSTEMS:  Constitutional: No fevers or chills. No weight loss. + fatigue or generalized malaise.  Eyes: No itching or discharge from the eyes  ENT: No ear pain. No ear discharge. No nasal congestion. No post nasal drip. No epistaxis. No throat pain. No sore throat. No difficulty swallowing.   CV: No chest pain. No palpitations. No lightheadedness or dizziness.   Resp: No dyspnea at rest. + dyspnea on exertion. No orthopnea. No wheezing. No cough. No stridor. No sputum production. No chest pain with respiration.  GI: No nausea. No vomiting. No diarrhea.  MSK: + joint pain or pain right sided extremities  Integumentary: No skin lesions. No pedal edema.  Neurological: No gross motor weakness. No sensory changes.  [+ ] All other systems negative  OBJECTIVE:  ICU Vital Signs Last 24 Hrs  T(C): 36.5 (27 Jan 2018 20:08), Max: 37.7 (26 Jan 2018 20:32)  T(F): 97.7 (27 Jan 2018 20:08), Max: 99.9 (26 Jan 2018 20:32)  HR: 84 (27 Jan 2018 20:08) (77 - 84)  BP: 122/66 (27 Jan 2018 20:08) (106/61 - 146/82)  BP(mean): --  ABP: --  ABP(mean): --  RR: 18 (27 Jan 2018 20:08) (18 - 18)  SpO2: 90% (27 Jan 2018 20:08) (90% - 97%)        01-26 @ 07:01 - 01-27 @ 07:00  --------------------------------------------------------  IN: 795 mL / OUT: 1000 mL / NET: -205 mL    01-27 @ 07:01 - 01-27 @ 20:15  --------------------------------------------------------  IN: 720 mL / OUT: 700 mL / NET: 20 mL      CAPILLARY BLOOD GLUCOSE      POCT Blood Glucose.: 101 mg/dL (27 Jan 2018 12:07)      PHYSICAL EXAM:  General:   HEENT:   Lymph Nodes:  Neck:   Respiratory:   Cardiovascular:   Abdomen:   Extremities:   Skin:   Neurological:  Psychiatry:    HOSPITAL MEDICATIONS:  MEDICATIONS  (STANDING):  aspirin enteric coated 81 milliGRAM(s) Oral daily  buDESOnide 160 MICROgram(s)/formoterol 4.5 MICROgram(s) Inhaler 2 Puff(s) Inhalation two times a day  cefTRIAXone   IVPB 2 Gram(s) IV Intermittent every 24 hours  collagenase Ointment 1 Application(s) Topical two times a day  enoxaparin Injectable 120 milliGRAM(s) SubCutaneous two times a day  ferrous    sulfate 325 milliGRAM(s) Oral daily  lactobacillus acidophilus 1 Tablet(s) Oral daily  melatonin 1 milliGRAM(s) Oral at bedtime  multivitamin 1 Tablet(s) Oral daily  pantoprazole    Tablet 40 milliGRAM(s) Oral before breakfast  predniSONE   Tablet 2.5 milliGRAM(s) Oral daily  simvastatin 20 milliGRAM(s) Oral at bedtime  sodium chloride 0.65% Nasal 1 Spray(s) Both Nostrils three times a day  warfarin 5 milliGRAM(s) Oral once    MEDICATIONS  (PRN):  acetaminophen    Suspension 650 milliGRAM(s) Oral every 6 hours PRN For Temp greater than 38 C (100.4 F)  acetaminophen   Tablet. 650 milliGRAM(s) Oral every 6 hours PRN Mild Pain (1 - 3)  ALPRAZolam 0.25 milliGRAM(s) Oral daily PRN anxiety  melatonin 3 milliGRAM(s) Oral at bedtime PRN Insomnia  oxyCODONE    IR 10 milliGRAM(s) Oral every 6 hours PRN Moderate Pain (4 - 6)      LABS:                        8.5    8.12  )-----------( 197      ( 27 Jan 2018 09:02 )             27.7     01-27    140  |  102  |  25<H>  ----------------------------<  81  5.7<H>   |  25  |  1.05    Ca    8.9      27 Jan 2018 09:13    TPro  6.7  /  Alb  2.4<L>  /  TBili  <0.2  /  DBili  x   /  AST  18  /  ALT  10  /  AlkPhos  96  01-27    PT/INR - ( 27 Jan 2018 09:02 )   PT: 21.7 sec;   INR: 1.90 ratio         PTT - ( 27 Jan 2018 09:02 )  PTT:45.0 sec          MICROBIOLOGY:     RADIOLOGY:  b/l small effusions with atelectasis  [ ] Reviewed and interpreted by me    PULMONARY FUNCTION TESTS:    EKG: CHIEF COMPLAINT:- no cough or sob    Interval Events: OOB yesterday, doppler done    REVIEW OF SYSTEMS:  Constitutional: No fevers or chills. No weight loss. + fatigue or generalized malaise.  Eyes: No itching or discharge from the eyes  ENT: No ear pain. No ear discharge. No nasal congestion. No post nasal drip. No epistaxis. No throat pain. No sore throat. No difficulty swallowing.   CV: No chest pain. No palpitations. No lightheadedness or dizziness.   Resp: No dyspnea at rest. + dyspnea on exertion. No orthopnea. No wheezing. No cough. No stridor. No sputum production. No chest pain with respiration.  GI: No nausea. No vomiting. No diarrhea.  MSK: + joint pain or pain right sided extremities  Integumentary: No skin lesions. No pedal edema.  Neurological: No gross motor weakness. No sensory changes.  [+ ] All other systems negative  OBJECTIVE:  ICU Vital Signs Last 24 Hrs  T(C): 36.5 (27 Jan 2018 20:08), Max: 37.7 (26 Jan 2018 20:32)  T(F): 97.7 (27 Jan 2018 20:08), Max: 99.9 (26 Jan 2018 20:32)  HR: 84 (27 Jan 2018 20:08) (77 - 84)  BP: 122/66 (27 Jan 2018 20:08) (106/61 - 146/82)  BP(mean): --  ABP: --  ABP(mean): --  RR: 18 (27 Jan 2018 20:08) (18 - 18)  SpO2: 90% (27 Jan 2018 20:08) (90% - 97%)        01-26 @ 07:01 - 01-27 @ 07:00  --------------------------------------------------------  IN: 795 mL / OUT: 1000 mL / NET: -205 mL    01-27 @ 07:01 - 01-27 @ 20:15  --------------------------------------------------------  IN: 720 mL / OUT: 700 mL / NET: 20 mL      CAPILLARY BLOOD GLUCOSE      POCT Blood Glucose.: 101 mg/dL (27 Jan 2018 12:07)      PHYSICAL EXAM: very emotional over all in bed w/O2 in place  General: Awake, alert, oriented X 3.   HEENT: Atraumatic, normocephalic.                 Mallampatti Grade 3                No nasal congestion.                No tonsillar or pharyngeal exudates.  Lymph Nodes: No palpable lymphadenopathy  Neck: No JVD. No carotid bruit.   Respiratory: reduced chest expansion                         Normal percussion                         Normal and equal air entry                         No wheeze, rhonchi or rales.  Cardiovascular: S1 S2 normal. + murmurs, no rubs or gallops.   Abdomen: Soft, non-tender, non-distended. No organomegaly.  Extremities: Warm to touch. Peripheral pulse palpable. No pedal edema.  R leg braced  Skin: No rashes or skin lesions  Neurological: Motor and sensory examination equal and normal in all four extremities.  Psychiatry: Appropriate mood and affect.      HOSPITAL MEDICATIONS:  MEDICATIONS  (STANDING):  aspirin enteric coated 81 milliGRAM(s) Oral daily  buDESOnide 160 MICROgram(s)/formoterol 4.5 MICROgram(s) Inhaler 2 Puff(s) Inhalation two times a day  cefTRIAXone   IVPB 2 Gram(s) IV Intermittent every 24 hours  collagenase Ointment 1 Application(s) Topical two times a day  enoxaparin Injectable 120 milliGRAM(s) SubCutaneous two times a day  ferrous    sulfate 325 milliGRAM(s) Oral daily  lactobacillus acidophilus 1 Tablet(s) Oral daily  melatonin 1 milliGRAM(s) Oral at bedtime  multivitamin 1 Tablet(s) Oral daily  pantoprazole    Tablet 40 milliGRAM(s) Oral before breakfast  predniSONE   Tablet 2.5 milliGRAM(s) Oral daily  simvastatin 20 milliGRAM(s) Oral at bedtime  sodium chloride 0.65% Nasal 1 Spray(s) Both Nostrils three times a day  warfarin 5 milliGRAM(s) Oral once    MEDICATIONS  (PRN):  acetaminophen    Suspension 650 milliGRAM(s) Oral every 6 hours PRN For Temp greater than 38 C (100.4 F)  acetaminophen   Tablet. 650 milliGRAM(s) Oral every 6 hours PRN Mild Pain (1 - 3)  ALPRAZolam 0.25 milliGRAM(s) Oral daily PRN anxiety  melatonin 3 milliGRAM(s) Oral at bedtime PRN Insomnia  oxyCODONE    IR 10 milliGRAM(s) Oral every 6 hours PRN Moderate Pain (4 - 6)      LABS:                        8.5    8.12  )-----------( 197      ( 27 Jan 2018 09:02 )             27.7     01-27    140  |  102  |  25<H>  ----------------------------<  81  5.7<H>   |  25  |  1.05    Ca    8.9      27 Jan 2018 09:13    TPro  6.7  /  Alb  2.4<L>  /  TBili  <0.2  /  DBili  x   /  AST  18  /  ALT  10  /  AlkPhos  96  01-27    PT/INR - ( 27 Jan 2018 09:02 )   PT: 21.7 sec;   INR: 1.90 ratio         PTT - ( 27 Jan 2018 09:02 )  PTT:45.0 sec          MICROBIOLOGY:     RADIOLOGY:  b/l small effusions with atelectasis  [ ] Reviewed and interpreted by me    PULMONARY FUNCTION TESTS:    EKG:

## 2018-01-27 NOTE — PROGRESS NOTE ADULT - SUBJECTIVE AND OBJECTIVE BOX
CC: f/u for fever, S sanguinis bacteremia    Patient somnolent, no distress, pain controlled    REVIEW OF SYSTEMS:  All other review of systems negative (Comprehensive ROS)    Antimicrobials Day # 35/42:  cefTRIAXone   IVPB 2 Gram(s) IV Intermittent every 24 hours    Other Medications Reviewed    Vital Signs Last 24 Hrs  T(F): 97.8 (27 Jan 2018 12:03), Max: 99.9 (26 Jan 2018 20:32)  HR: 77 (27 Jan 2018 12:03) (75 - 84)  BP: 146/82 (27 Jan 2018 12:03) (106/61 - 146/82)  BP(mean): --  RR: 18 (27 Jan 2018 12:03) (18 - 18)  SpO2: 91% (27 Jan 2018 12:03) (91% - 97%)    PHYSICAL EXAM:  General: alert, no acute distress  Eyes:  anicteric, no conjunctival injection, no discharge  Oropharynx: no lesions or injection 	  Neck: supple, without adenopathy  Lungs: clear to auscultation  Heart: regular rate and rhythm; no murmur, rubs or gallops  Abdomen: soft, nondistended, nontender, without mass or organomegaly  Skin: no lesions  Extremities: R knee incision intact, no cellulitis or drainage.  R PICC site clean  Neurologic: alert, oriented, moves all extremities    LAB RESULTS:                        8.5    8.12  )-----------( 197      ( 27 Jan 2018 09:02 )             27.7   01-27    140  |  102  |  25<H>  ----------------------------<  81  5.7<H>   |  25  |  1.05    Ca    8.9      27 Jan 2018 09:13    TPro  6.7  /  Alb  2.4<L>  /  TBili  <0.2  /  DBili  x   /  AST  18  /  ALT  10  /  AlkPhos  96  01-27    MICROBIOLOGY:  RECENT CULTURES:  01-23 @ 23:08 .Blood Blood-Peripheral     No growth to date.      01-23 @ 16:09 .Urine Clean Catch (Midstream)     No growth      01-23 @ 14:49 .Blood Blood-Peripheral     No growth to date.      RADIOLOGY REVIEWED:  LE dopplers R DVT  Chest CT atelectasis    Transthoracic Echocardiogram (01.25.18 @ 14:10) >  1. Mitral annular calcification. Mild mitral regurgitation.  2. Transcatheter aortic valve replacement. The valve is  well seated.  Peak transaortic valve gradient equals 36 mm  Hg, mean transaortic valve gradient equals 19 mm Hg, which  is probably normal in the presence of a transcatheter  aortic valve replacement. No valvular aortic valve  regurgitation and minimal paravalvular aortic  regurgitation.  3. Severely dilated left atrium.  LA volume index = 50  cc/m2.  4. Normal left ventricular systolic function. The basal  inferolateral wall, and the basal inferior wall are mildly  hypokinetic.  5. Moderate diastolic dysfunction (Stage II).  6. Right ventricular enlargement with normal right  ventricular systolic function. A device wire is noted in  the right heart.  7. Tricuspid valve not well visualized. Moderate tricuspid  regurgitation.

## 2018-01-27 NOTE — PROGRESS NOTE ADULT - ASSESSMENT
A/P: 77F s/p Stage 1 Revision TKA     pain control  PWB RLE  c/w daily q2 dressing changes  Therapeutic lovenox for anticoagulation bridge to coumadin  discussed with tai re icvf and patient will fu outpt for placement before next schedule surgery, in the meantime we will anticoagulate  FU Labs  dc planning

## 2018-01-27 NOTE — PROGRESS NOTE ADULT - PROBLEM SELECTOR PLAN 3
No clearcut source except perhaps DVT or atelectasis  Doubt her rt knee as source of infection  on ceftriaxone empirically for 6 weeks  appreciate ID No clearcut source except perhaps DVT or atelectasis  Doubt her rt knee as source of infection  on ceftriaxone empirically for 6 weeks via picc  appreciate ID

## 2018-01-27 NOTE — PROGRESS NOTE ADULT - ATTENDING COMMENTS
pt seen and examined  -Hypoxemia (obesity hypoventilation, PE, atelectasis, asthma, Cardiac disease)    optimize resp regimen w/ bronchodilators and Neb rx; AC initiated as pt now has DVT and likely PE--OOB as able  check echo and optimize cards regimen  ABX as per RIVERA Kumar MD-Pulmonary  to follow the pt from monday onwards  - pt seen and examined  -Hypoxemia (obesity hypoventilation, PE, atelectasis, asthma, Cardiac disease)    optimize resp regimen w/ bronchodilators and Neb rx; AC initiated as pt now has DVT and likely PE--OOB as able ---on lovenox will need coumadin  ?ivc filter prior to her hip procedure [pt to d/w dr dial]   check echo and optimize cards regimen  ABX as per RIVERA Dial MD-Pulmonary  to follow the pt from monday onwards  -

## 2018-01-27 NOTE — PROGRESS NOTE ADULT - SUBJECTIVE AND OBJECTIVE BOX
Pt S/E at bedside, no acute events overnight, pain controlled    Vital Signs Last 24 Hrs  T(C): 37.2 (01-27-18 @ 04:18), Max: 37.7 (01-26-18 @ 20:32)  T(F): 98.9 (01-27-18 @ 04:18), Max: 99.9 (01-26-18 @ 20:32)  HR: 79 (01-27-18 @ 04:18) (66 - 84)  BP: 138/76 (01-27-18 @ 04:18) (106/61 - 144/78)  BP(mean): --  RR: 18 (01-27-18 @ 04:18) (18 - 18)  SpO2: 95% (01-27-18 @ 04:18) (95% - 98%)    Gen: NAD, AAOx3    Right Lower Extremity:  KI present  Dressing clean dry intact  +EHL/FHL/TA/GS  SILT L3-S1  +DP/PT Pulses  Compartments soft  No calf TTP B/L

## 2018-01-27 NOTE — PROGRESS NOTE ADULT - ASSESSMENT
78 yo female with h/o TAVR, PPM, PMR, on day 35/42 treatment for Strep sanguinis bacteremia and R knee PJI- s/p VIOLA/spacer  No signs of an active- right knee incision clean  DVT noted and likely link to most recent events  No evidence of pneumonia  No obvious new infection  Latest Cxs negative  TTE stable findings  Remains afebrile, WBC normal  No signs of drug toxicity    Plan:  Continue present regimen- CTX- to complete 6 wks as planned; I would favor stepdown to po regimen once IV finished- eg- Ceftin 500 mg po BID  Anticoagulation and/or IVC filter per medicine  Follow temps and CBC/diff

## 2018-01-27 NOTE — PROGRESS NOTE ADULT - ASSESSMENT
78 yo F with a h/o AS s/p TAVR and PPM 1 year ago, asthma on Advair, obesity, CAD, PMR on chronic prednisone at 2.5 mg, remote h/o b/l TKR, recent admission to [facility] in Dec for strept sanguinous bacteremia with knee seeding, s/o right knee explant on 12/23 and on long term Ceftriaxone therapy. Discharged to SNF on 1/2/18. Brought to ED on 1/23 for low grade fever and found to be hypoxemic to 91%, improved with supplemental O2.  CT chest non-con: b/l very small pleural effusions with adjacent atelectasis.   V/Q: low probability for PE---pos doppler c/w DVT  WBC 14.6 and YONATAN  Antibiotics switched to Cefepime   Inhalers: Symbicort and Duonebs  Denies any symptoms of cough, shortness of breath, chest tightness or wheezing. Denies nasal congestion. Has been sedentary for the majority of the past month 2/2 illness and recent right knee surgery.  Tmax 99.5    On exam, O2 sats range 89-91% on RA, improves to 94% with deep breathing techniques.    A/P: Hypoxemia - likely from a combination of MYAH/OHS, bibasilar atelectasis. At this time, no bronchospasm on exam and has no respiratory symptoms, I do not believe this is pneumonia.   However agree with repeating TTE to evaluate valves and chambers for shunt as well as LE dopplers c/w DVT and pt has likely had PE inspite of VQ results  Continue with supplemental O2, goal sats in the low 90s. Out of bed to chair  Incentive spirometer hourly - reviewed technique with patient with teachback.   C/W Symbicort and bronchodilators as needed.  Blood cultures negative to day  DVT prophylaxis    Thank you for the consult.

## 2018-01-27 NOTE — PROGRESS NOTE ADULT - ASSESSMENT
77-yo female recent rt hip hardware infection s/p removal and placement of spacer, on ceftriaxone through PICC, admitted with SIRS, unclear if there is an acute infection or not 77-yo female recent rt hip hardware infection s/p removal and placement of spacer, on ceftriaxone through PICC, admitted with hypoxia and fever and YONATAN. initially concerned for infection, but no sigsn of either.

## 2018-01-27 NOTE — PROGRESS NOTE ADULT - PROBLEM SELECTOR PLAN 1
Therapeutic lovenox  start coumadin  consulted vascular for retrievable IVC filter for upcoming rt hip procedure Therapeutic lovenox  coumadin daily dosing, goal 2-3  appreciate ortho and vascular recs, pt will fu outpt for IVC filter for upcoming rt hip procedure

## 2018-01-27 NOTE — PROGRESS NOTE ADULT - SUBJECTIVE AND OBJECTIVE BOX
Patient is a 77y old  Female who presents with a chief complaint of lethargy (23 Jan 2018 21:09)      SUBJECTIVE / OVERNIGHT EVENTS:    Patient seen and examined.       Vital Signs Last 24 Hrs  T(C): 37.2 (27 Jan 2018 04:18), Max: 37.7 (26 Jan 2018 20:32)  T(F): 98.9 (27 Jan 2018 04:18), Max: 99.9 (26 Jan 2018 20:32)  HR: 79 (27 Jan 2018 04:18) (66 - 84)  BP: 138/76 (27 Jan 2018 04:18) (106/61 - 144/78)  BP(mean): --  RR: 18 (27 Jan 2018 04:18) (18 - 18)  SpO2: 95% (27 Jan 2018 04:18) (95% - 98%)  I&O's Summary    26 Jan 2018 07:01  -  27 Jan 2018 07:00  --------------------------------------------------------  IN: 795 mL / OUT: 1000 mL / NET: -205 mL        PE:  GENERAL: NAD, AAOx3  HEAD:  Atraumatic, Normocephalic  EYES: EOMI, PERRLA, conjunctiva and sclera clear  NECK: Supple, No JVD  CHEST/LUNG: CTABL, No wheeze  HEART: Regular rate and rhythm; + murmur  ABDOMEN: Soft, Nontender, Nondistended; Bowel sounds present  EXTREMITIES:  2+ Peripheral Pulses, No clubbing, cyanosis, or edema  SKIN: No rashes or lesions  NEURO: No focal deficits    LABS:                        8.3    9.25  )-----------( 195      ( 26 Jan 2018 09:34 )             26.6     01-26    137  |  100  |  23  ----------------------------<  118<H>  5.7<H>   |  27  |  1.15    Ca    8.9      26 Jan 2018 22:46      PT/INR - ( 26 Jan 2018 09:34 )   PT: 16.6 sec;   INR: 1.46 ratio           CAPILLARY BLOOD GLUCOSE      POCT Blood Glucose.: 101 mg/dL (27 Jan 2018 07:29)  POCT Blood Glucose.: 145 mg/dL (27 Jan 2018 00:20)            RADIOLOGY & ADDITIONAL TESTS:    Imaging Personally Reviewed:  [x] YES  [ ] NO    Consultant(s) Notes Reviewed:  [x] YES  [ ] NO    MEDICATIONS  (STANDING):  aspirin enteric coated 81 milliGRAM(s) Oral daily  buDESOnide 160 MICROgram(s)/formoterol 4.5 MICROgram(s) Inhaler 2 Puff(s) Inhalation two times a day  cefTRIAXone   IVPB 2 Gram(s) IV Intermittent every 24 hours  collagenase Ointment 1 Application(s) Topical two times a day  enoxaparin Injectable 120 milliGRAM(s) SubCutaneous two times a day  ferrous    sulfate 325 milliGRAM(s) Oral daily  lactobacillus acidophilus 1 Tablet(s) Oral daily  melatonin 1 milliGRAM(s) Oral at bedtime  multivitamin 1 Tablet(s) Oral daily  pantoprazole    Tablet 40 milliGRAM(s) Oral before breakfast  predniSONE   Tablet 2.5 milliGRAM(s) Oral daily  simvastatin 20 milliGRAM(s) Oral at bedtime  sodium chloride 0.65% Nasal 1 Spray(s) Both Nostrils three times a day    MEDICATIONS  (PRN):  acetaminophen    Suspension 650 milliGRAM(s) Oral every 6 hours PRN For Temp greater than 38 C (100.4 F)  acetaminophen   Tablet. 650 milliGRAM(s) Oral every 6 hours PRN Mild Pain (1 - 3)  ALPRAZolam 0.25 milliGRAM(s) Oral daily PRN anxiety  melatonin 3 milliGRAM(s) Oral at bedtime PRN Insomnia  oxyCODONE    IR 10 milliGRAM(s) Oral every 6 hours PRN Moderate Pain (4 - 6)      Care Discussed with Consultants/Other Providers [x] YES  [ ] NO    HEALTH ISSUES - PROBLEM Dx:  Acute deep vein thrombosis (DVT) of right lower extremity, unspecified vein: Acute deep vein thrombosis (DVT) of right lower extremity, unspecified vein  DVT (deep venous thrombosis): DVT (deep venous thrombosis)  Obesity: Obesity  Prophylactic measure: Prophylactic measure  CAD (coronary artery disease): CAD (coronary artery disease)  Atelectasis of both lungs: Atelectasis of both lungs  Hypoxemia: Hypoxemia  Asthma: Asthma  SOB (shortness of breath): SOB (shortness of breath)  Metabolic encephalopathy: Metabolic encephalopathy  Fever: Fever  Hypoxia: Hypoxia  Status post total knee replacement, unspecified laterality: Status post total knee replacement, unspecified laterality  YONATAN (acute kidney injury): YONATAN (acute kidney injury)  Elevated troponin: Elevated troponin  Gastroesophageal reflux disease, esophagitis presence not specified: Gastroesophageal reflux disease, esophagitis presence not specified  Asthma, unspecified asthma severity, unspecified whether complicated, unspecified whether persistent: Asthma, unspecified asthma severity, unspecified whether complicated, unspecified whether persistent  Polymyalgia: Polymyalgia  Aortic stenosis, severe: Aortic stenosis, severe  Pneumonia due to infectious organism, unspecified laterality, unspecified part of lung: Pneumonia due to infectious organism, unspecified laterality, unspecified part of lung Patient is a 77y old  Female who presents with a chief complaint of lethargy (23 Jan 2018 21:09)      SUBJECTIVE / OVERNIGHT EVENTS:    Patient seen and examined. denies cp sob nvd. no acute events.      Vital Signs Last 24 Hrs  T(C): 37.2 (27 Jan 2018 04:18), Max: 37.7 (26 Jan 2018 20:32)  T(F): 98.9 (27 Jan 2018 04:18), Max: 99.9 (26 Jan 2018 20:32)  HR: 79 (27 Jan 2018 04:18) (66 - 84)  BP: 138/76 (27 Jan 2018 04:18) (106/61 - 144/78)  BP(mean): --  RR: 18 (27 Jan 2018 04:18) (18 - 18)  SpO2: 95% (27 Jan 2018 04:18) (95% - 98%)  I&O's Summary    26 Jan 2018 07:01  -  27 Jan 2018 07:00  --------------------------------------------------------  IN: 795 mL / OUT: 1000 mL / NET: -205 mL        PE:  GENERAL: NAD, AAOx3, obese  HEAD:  Atraumatic, Normocephalic  EYES: EOMI, PERRLA, conjunctiva and sclera clear  NECK: Supple, No JVD  CHEST/LUNG: CTABL, No wheeze  HEART: Regular rate and rhythm; no murmur  ABDOMEN: Soft, Nontender, Nondistended; Bowel sounds present  EXTREMITIES:  2+ Peripheral Pulses, right leg in brace  SKIN: No rashes or lesions  NEURO: No focal deficits    LABS:                        8.3    9.25  )-----------( 195      ( 26 Jan 2018 09:34 )             26.6     01-26    137  |  100  |  23  ----------------------------<  118<H>  5.7<H>   |  27  |  1.15    Ca    8.9      26 Jan 2018 22:46      PT/INR - ( 26 Jan 2018 09:34 )   PT: 16.6 sec;   INR: 1.46 ratio           CAPILLARY BLOOD GLUCOSE      POCT Blood Glucose.: 101 mg/dL (27 Jan 2018 07:29)  POCT Blood Glucose.: 145 mg/dL (27 Jan 2018 00:20)            RADIOLOGY & ADDITIONAL TESTS:    Imaging Personally Reviewed:  [x] YES  [ ] NO    Consultant(s) Notes Reviewed:  [x] YES  [ ] NO    MEDICATIONS  (STANDING):  aspirin enteric coated 81 milliGRAM(s) Oral daily  buDESOnide 160 MICROgram(s)/formoterol 4.5 MICROgram(s) Inhaler 2 Puff(s) Inhalation two times a day  cefTRIAXone   IVPB 2 Gram(s) IV Intermittent every 24 hours  collagenase Ointment 1 Application(s) Topical two times a day  enoxaparin Injectable 120 milliGRAM(s) SubCutaneous two times a day  ferrous    sulfate 325 milliGRAM(s) Oral daily  lactobacillus acidophilus 1 Tablet(s) Oral daily  melatonin 1 milliGRAM(s) Oral at bedtime  multivitamin 1 Tablet(s) Oral daily  pantoprazole    Tablet 40 milliGRAM(s) Oral before breakfast  predniSONE   Tablet 2.5 milliGRAM(s) Oral daily  simvastatin 20 milliGRAM(s) Oral at bedtime  sodium chloride 0.65% Nasal 1 Spray(s) Both Nostrils three times a day    MEDICATIONS  (PRN):  acetaminophen    Suspension 650 milliGRAM(s) Oral every 6 hours PRN For Temp greater than 38 C (100.4 F)  acetaminophen   Tablet. 650 milliGRAM(s) Oral every 6 hours PRN Mild Pain (1 - 3)  ALPRAZolam 0.25 milliGRAM(s) Oral daily PRN anxiety  melatonin 3 milliGRAM(s) Oral at bedtime PRN Insomnia  oxyCODONE    IR 10 milliGRAM(s) Oral every 6 hours PRN Moderate Pain (4 - 6)      Care Discussed with Consultants/Other Providers [x] YES  [ ] NO    HEALTH ISSUES - PROBLEM Dx:  Acute deep vein thrombosis (DVT) of right lower extremity, unspecified vein: Acute deep vein thrombosis (DVT) of right lower extremity, unspecified vein  DVT (deep venous thrombosis): DVT (deep venous thrombosis)  Obesity: Obesity  Prophylactic measure: Prophylactic measure  CAD (coronary artery disease): CAD (coronary artery disease)  Atelectasis of both lungs: Atelectasis of both lungs  Hypoxemia: Hypoxemia  Asthma: Asthma  SOB (shortness of breath): SOB (shortness of breath)  Metabolic encephalopathy: Metabolic encephalopathy  Fever: Fever  Hypoxia: Hypoxia  Status post total knee replacement, unspecified laterality: Status post total knee replacement, unspecified laterality  YONATAN (acute kidney injury): YONATAN (acute kidney injury)  Elevated troponin: Elevated troponin  Gastroesophageal reflux disease, esophagitis presence not specified: Gastroesophageal reflux disease, esophagitis presence not specified  Asthma, unspecified asthma severity, unspecified whether complicated, unspecified whether persistent: Asthma, unspecified asthma severity, unspecified whether complicated, unspecified whether persistent  Polymyalgia: Polymyalgia  Aortic stenosis, severe: Aortic stenosis, severe  Pneumonia due to infectious organism, unspecified laterality, unspecified part of lung: Pneumonia due to infectious organism, unspecified laterality, unspecified part of lung

## 2018-01-28 LAB
ANION GAP SERPL CALC-SCNC: 14 MMOL/L — SIGNIFICANT CHANGE UP (ref 5–17)
APTT BLD: 52.1 SEC — HIGH (ref 27.5–37.4)
BUN SERPL-MCNC: 22 MG/DL — SIGNIFICANT CHANGE UP (ref 7–23)
CALCIUM SERPL-MCNC: 8.9 MG/DL — SIGNIFICANT CHANGE UP (ref 8.4–10.5)
CHLORIDE SERPL-SCNC: 99 MMOL/L — SIGNIFICANT CHANGE UP (ref 96–108)
CO2 SERPL-SCNC: 25 MMOL/L — SIGNIFICANT CHANGE UP (ref 22–31)
CREAT SERPL-MCNC: 1.01 MG/DL — SIGNIFICANT CHANGE UP (ref 0.5–1.3)
CULTURE RESULTS: SIGNIFICANT CHANGE UP
GLUCOSE SERPL-MCNC: 93 MG/DL — SIGNIFICANT CHANGE UP (ref 70–99)
HCT VFR BLD CALC: 26.3 % — LOW (ref 34.5–45)
HGB BLD-MCNC: 8.1 G/DL — LOW (ref 11.5–15.5)
INR BLD: 3.35 RATIO — HIGH (ref 0.88–1.16)
MCHC RBC-ENTMCNC: 27.9 PG — SIGNIFICANT CHANGE UP (ref 27–34)
MCHC RBC-ENTMCNC: 30.8 GM/DL — LOW (ref 32–36)
MCV RBC AUTO: 90.7 FL — SIGNIFICANT CHANGE UP (ref 80–100)
PLATELET # BLD AUTO: 192 K/UL — SIGNIFICANT CHANGE UP (ref 150–400)
POTASSIUM SERPL-MCNC: 5.2 MMOL/L — SIGNIFICANT CHANGE UP (ref 3.5–5.3)
POTASSIUM SERPL-SCNC: 5.2 MMOL/L — SIGNIFICANT CHANGE UP (ref 3.5–5.3)
PROTHROM AB SERPL-ACNC: 38.8 SEC — HIGH (ref 10–13.1)
RBC # BLD: 2.9 M/UL — LOW (ref 3.8–5.2)
RBC # FLD: 16.3 % — HIGH (ref 10.3–14.5)
SODIUM SERPL-SCNC: 138 MMOL/L — SIGNIFICANT CHANGE UP (ref 135–145)
SPECIMEN SOURCE: SIGNIFICANT CHANGE UP
WBC # BLD: 7.53 K/UL — SIGNIFICANT CHANGE UP (ref 3.8–10.5)
WBC # FLD AUTO: 7.53 K/UL — SIGNIFICANT CHANGE UP (ref 3.8–10.5)

## 2018-01-28 RX ORDER — LISINOPRIL 2.5 MG/1
10 TABLET ORAL DAILY
Qty: 0 | Refills: 0 | Status: DISCONTINUED | OUTPATIENT
Start: 2018-01-28 | End: 2018-01-29

## 2018-01-28 RX ORDER — OXYCODONE HYDROCHLORIDE 5 MG/1
5 TABLET ORAL EVERY 6 HOURS
Qty: 0 | Refills: 0 | Status: DISCONTINUED | OUTPATIENT
Start: 2018-01-28 | End: 2018-01-29

## 2018-01-28 RX ORDER — IPRATROPIUM/ALBUTEROL SULFATE 18-103MCG
3 AEROSOL WITH ADAPTER (GRAM) INHALATION ONCE
Qty: 0 | Refills: 0 | Status: COMPLETED | OUTPATIENT
Start: 2018-01-28 | End: 2018-01-28

## 2018-01-28 RX ADMIN — Medication 1 MILLIGRAM(S): at 21:09

## 2018-01-28 RX ADMIN — Medication 1 TABLET(S): at 11:11

## 2018-01-28 RX ADMIN — Medication 325 MILLIGRAM(S): at 11:11

## 2018-01-28 RX ADMIN — Medication 1 APPLICATION(S): at 07:35

## 2018-01-28 RX ADMIN — OXYCODONE HYDROCHLORIDE 5 MILLIGRAM(S): 5 TABLET ORAL at 11:26

## 2018-01-28 RX ADMIN — PANTOPRAZOLE SODIUM 40 MILLIGRAM(S): 20 TABLET, DELAYED RELEASE ORAL at 05:40

## 2018-01-28 RX ADMIN — Medication 3 MILLIGRAM(S): at 23:42

## 2018-01-28 RX ADMIN — CEFTRIAXONE 100 GRAM(S): 500 INJECTION, POWDER, FOR SOLUTION INTRAMUSCULAR; INTRAVENOUS at 21:11

## 2018-01-28 RX ADMIN — BUDESONIDE AND FORMOTEROL FUMARATE DIHYDRATE 2 PUFF(S): 160; 4.5 AEROSOL RESPIRATORY (INHALATION) at 17:02

## 2018-01-28 RX ADMIN — LISINOPRIL 10 MILLIGRAM(S): 2.5 TABLET ORAL at 12:21

## 2018-01-28 RX ADMIN — ENOXAPARIN SODIUM 120 MILLIGRAM(S): 100 INJECTION SUBCUTANEOUS at 17:02

## 2018-01-28 RX ADMIN — OXYCODONE HYDROCHLORIDE 5 MILLIGRAM(S): 5 TABLET ORAL at 21:38

## 2018-01-28 RX ADMIN — SIMVASTATIN 20 MILLIGRAM(S): 20 TABLET, FILM COATED ORAL at 21:09

## 2018-01-28 RX ADMIN — Medication 1 APPLICATION(S): at 21:09

## 2018-01-28 RX ADMIN — Medication 0.25 MILLIGRAM(S): at 09:31

## 2018-01-28 RX ADMIN — Medication 2.5 MILLIGRAM(S): at 05:40

## 2018-01-28 RX ADMIN — Medication 1 SPRAY(S): at 21:10

## 2018-01-28 RX ADMIN — ENOXAPARIN SODIUM 120 MILLIGRAM(S): 100 INJECTION SUBCUTANEOUS at 05:40

## 2018-01-28 RX ADMIN — BUDESONIDE AND FORMOTEROL FUMARATE DIHYDRATE 2 PUFF(S): 160; 4.5 AEROSOL RESPIRATORY (INHALATION) at 05:40

## 2018-01-28 RX ADMIN — Medication 3 MILLILITER(S): at 23:29

## 2018-01-28 RX ADMIN — Medication 1 SPRAY(S): at 05:40

## 2018-01-28 RX ADMIN — Medication 81 MILLIGRAM(S): at 11:11

## 2018-01-28 RX ADMIN — OXYCODONE HYDROCHLORIDE 5 MILLIGRAM(S): 5 TABLET ORAL at 12:25

## 2018-01-28 RX ADMIN — OXYCODONE HYDROCHLORIDE 5 MILLIGRAM(S): 5 TABLET ORAL at 22:40

## 2018-01-28 NOTE — PROGRESS NOTE ADULT - ASSESSMENT
77-yo female recent rt hip hardware infection s/p removal and placement of spacer, on ceftriaxone through PICC, admitted with hypoxia and fever and YONATAN. initially concerned for infection, but no sigsn of either.

## 2018-01-28 NOTE — PROGRESS NOTE ADULT - PROBLEM SELECTOR PLAN 1
Therapeutic lovenox bridge  coumadin daily dosing, goal 2-3, can DC lovenox once INR >2  appreciate ortho and vascular recs, pt will fu outpt for IVC filter for upcoming rt hip procedure

## 2018-01-28 NOTE — PROGRESS NOTE ADULT - PROBLEM SELECTOR PLAN 3
No clearcut source except perhaps DVT or atelectasis  Doubt her rt knee as source of infection  on ceftriaxone empirically for 6 weeks via picc, then possible ceftin PO  appreciate ID

## 2018-01-28 NOTE — PROGRESS NOTE ADULT - SUBJECTIVE AND OBJECTIVE BOX
Pt S/E at bedside, no acute events overnight, pain controlled    T(C): 36.7 (01-28-18 @ 04:53), Max: 36.7 (01-28-18 @ 04:53)  HR: 75 (01-28-18 @ 04:53) (75 - 84)  BP: 137/74 (01-28-18 @ 04:53) (122/66 - 146/82)  RR: 18 (01-28-18 @ 04:53) (18 - 18)  SpO2: 98% (01-28-18 @ 04:53) (90% - 98%)  Wt(kg): --                          8.5    8.12  )-----------( 197      ( 27 Jan 2018 09:02 )             27.7     01-27    140  |  102  |  25<H>  ----------------------------<  81  5.7<H>   |  25  |  1.05    Ca    8.9      27 Jan 2018 09:13    TPro  6.7  /  Alb  2.4<L>  /  TBili  <0.2  /  DBili  x   /  AST  18  /  ALT  10  /  AlkPhos  96  01-27    PT/INR - ( 27 Jan 2018 09:02 )   PT: 21.7 sec;   INR: 1.90 ratio         PTT - ( 27 Jan 2018 09:02 )  PTT:45.0 sec    Gen: NAD, AAOx3    Right Lower Extremity:  KI present  Dressing clean dry intact  +EHL/FHL/TA/GS  SILT L3-S1  +DP/PT Pulses  Compartments soft  No calf TTP B/L      Assessment and Plan:   · Assessment		  A/P: 77F s/p Stage 1 Revision TKA readmitted with fevers, SOB    pain control  PWB RLE  c/w daily q2 dressing changes  Therapeutic lovenox for anticoagulation bridge to coumadin  c/w abx, FU ID  FU Labs  dc planning per medicine

## 2018-01-28 NOTE — PROGRESS NOTE ADULT - SUBJECTIVE AND OBJECTIVE BOX
Patient is a 77y old  Female who presents with a chief complaint of lethargy (23 Jan 2018 21:09)      SUBJECTIVE / OVERNIGHT EVENTS:    Patient seen and examined. denies cp sob nvd. eager to go to rehab.      Vital Signs Last 24 Hrs  T(C): 36.7 (28 Jan 2018 04:53), Max: 36.7 (28 Jan 2018 04:53)  T(F): 98.1 (28 Jan 2018 04:53), Max: 98.1 (28 Jan 2018 04:53)  HR: 75 (28 Jan 2018 04:53) (75 - 84)  BP: 137/74 (28 Jan 2018 04:53) (122/66 - 146/82)  BP(mean): --  RR: 18 (28 Jan 2018 04:53) (18 - 18)  SpO2: 98% (28 Jan 2018 04:53) (90% - 98%)  I&O's Summary    27 Jan 2018 07:01  -  28 Jan 2018 07:00  --------------------------------------------------------  IN: 770 mL / OUT: 700 mL / NET: 70 mL        PE:  GENERAL: NAD, AAOx3, obese  HEAD:  Atraumatic, Normocephalic  EYES: EOMI, PERRLA, conjunctiva and sclera clear  NECK: Supple, No JVD  CHEST/LUNG: CTABL, No wheeze  HEART: Regular rate and rhythm; no murmur  ABDOMEN: Soft, Nontender, Nondistended; Bowel sounds present  EXTREMITIES:  2+ Peripheral Pulses, right leg in brace  SKIN: No rashes or lesions  NEURO: No focal deficits    LABS:                        8.1    7.53  )-----------( 192      ( 28 Jan 2018 08:11 )             26.3     01-27    140  |  102  |  25<H>  ----------------------------<  81  5.7<H>   |  25  |  1.05    Ca    8.9      27 Jan 2018 09:13    TPro  6.7  /  Alb  2.4<L>  /  TBili  <0.2  /  DBili  x   /  AST  18  /  ALT  10  /  AlkPhos  96  01-27    PT/INR - ( 27 Jan 2018 09:02 )   PT: 21.7 sec;   INR: 1.90 ratio         PTT - ( 27 Jan 2018 09:02 )  PTT:45.0 sec  CAPILLARY BLOOD GLUCOSE      POCT Blood Glucose.: 101 mg/dL (27 Jan 2018 12:07)            RADIOLOGY & ADDITIONAL TESTS:    Imaging Personally Reviewed:  [x] YES  [ ] NO    Consultant(s) Notes Reviewed:  [x] YES  [ ] NO    MEDICATIONS  (STANDING):  aspirin enteric coated 81 milliGRAM(s) Oral daily  buDESOnide 160 MICROgram(s)/formoterol 4.5 MICROgram(s) Inhaler 2 Puff(s) Inhalation two times a day  cefTRIAXone   IVPB 2 Gram(s) IV Intermittent every 24 hours  collagenase Ointment 1 Application(s) Topical two times a day  enoxaparin Injectable 120 milliGRAM(s) SubCutaneous two times a day  ferrous    sulfate 325 milliGRAM(s) Oral daily  lactobacillus acidophilus 1 Tablet(s) Oral daily  melatonin 1 milliGRAM(s) Oral at bedtime  multivitamin 1 Tablet(s) Oral daily  pantoprazole    Tablet 40 milliGRAM(s) Oral before breakfast  predniSONE   Tablet 2.5 milliGRAM(s) Oral daily  simvastatin 20 milliGRAM(s) Oral at bedtime  sodium chloride 0.65% Nasal 1 Spray(s) Both Nostrils three times a day    MEDICATIONS  (PRN):  acetaminophen    Suspension 650 milliGRAM(s) Oral every 6 hours PRN For Temp greater than 38 C (100.4 F)  acetaminophen   Tablet. 650 milliGRAM(s) Oral every 6 hours PRN Mild Pain (1 - 3)  ALPRAZolam 0.25 milliGRAM(s) Oral daily PRN anxiety  melatonin 3 milliGRAM(s) Oral at bedtime PRN Insomnia  oxyCODONE    IR 10 milliGRAM(s) Oral every 6 hours PRN Moderate Pain (4 - 6)      Care Discussed with Consultants/Other Providers [x] YES  [ ] NO    HEALTH ISSUES - PROBLEM Dx:  Acute deep vein thrombosis (DVT) of right lower extremity, unspecified vein: Acute deep vein thrombosis (DVT) of right lower extremity, unspecified vein  DVT (deep venous thrombosis): DVT (deep venous thrombosis)  Obesity: Obesity  Prophylactic measure: Prophylactic measure  CAD (coronary artery disease): CAD (coronary artery disease)  Atelectasis of both lungs: Atelectasis of both lungs  Hypoxemia: Hypoxemia  Asthma: Asthma  SOB (shortness of breath): SOB (shortness of breath)  Metabolic encephalopathy: Metabolic encephalopathy  Fever: Fever  Hypoxia: Hypoxia  Status post total knee replacement, unspecified laterality: Status post total knee replacement, unspecified laterality  YONATAN (acute kidney injury): YOANTAN (acute kidney injury)  Elevated troponin: Elevated troponin  Gastroesophageal reflux disease, esophagitis presence not specified: Gastroesophageal reflux disease, esophagitis presence not specified  Asthma, unspecified asthma severity, unspecified whether complicated, unspecified whether persistent: Asthma, unspecified asthma severity, unspecified whether complicated, unspecified whether persistent  Polymyalgia: Polymyalgia  Aortic stenosis, severe: Aortic stenosis, severe  Pneumonia due to infectious organism, unspecified laterality, unspecified part of lung: Pneumonia due to infectious organism, unspecified laterality, unspecified part of lung

## 2018-01-28 NOTE — PROGRESS NOTE ADULT - PROBLEM SELECTOR PLAN 10
YONATAN on CKD  improved s/p IVF    dispo: pending goal INR, then rehab placement YONATAN on CKD  improved s/p IVF  anemia like 2/2 CKD, guaic neg    dispo: pending goal INR, then rehab placement YONATAN on CKD  improved s/p IVF  anemia like 2/2 CKD, guaic neg  can resume ACEI on DC    dispo: pending goal INR, then rehab placement

## 2018-01-29 ENCOUNTER — TRANSCRIPTION ENCOUNTER (OUTPATIENT)
Age: 78
End: 2018-01-29

## 2018-01-29 VITALS
DIASTOLIC BLOOD PRESSURE: 83 MMHG | TEMPERATURE: 98 F | SYSTOLIC BLOOD PRESSURE: 139 MMHG | HEART RATE: 75 BPM | RESPIRATION RATE: 18 BRPM | OXYGEN SATURATION: 95 %

## 2018-01-29 LAB
ANION GAP SERPL CALC-SCNC: 13 MMOL/L — SIGNIFICANT CHANGE UP (ref 5–17)
BUN SERPL-MCNC: 17 MG/DL — SIGNIFICANT CHANGE UP (ref 7–23)
CALCIUM SERPL-MCNC: 8.6 MG/DL — SIGNIFICANT CHANGE UP (ref 8.4–10.5)
CHLORIDE SERPL-SCNC: 101 MMOL/L — SIGNIFICANT CHANGE UP (ref 96–108)
CO2 SERPL-SCNC: 25 MMOL/L — SIGNIFICANT CHANGE UP (ref 22–31)
CREAT SERPL-MCNC: 0.86 MG/DL — SIGNIFICANT CHANGE UP (ref 0.5–1.3)
CULTURE RESULTS: SIGNIFICANT CHANGE UP
GLUCOSE SERPL-MCNC: 97 MG/DL — SIGNIFICANT CHANGE UP (ref 70–99)
HCT VFR BLD CALC: 26.5 % — LOW (ref 34.5–45)
HGB BLD-MCNC: 8.1 G/DL — LOW (ref 11.5–15.5)
INR BLD: 3.92 RATIO — HIGH (ref 0.88–1.16)
MCHC RBC-ENTMCNC: 27.5 PG — SIGNIFICANT CHANGE UP (ref 27–34)
MCHC RBC-ENTMCNC: 30.6 GM/DL — LOW (ref 32–36)
MCV RBC AUTO: 89.8 FL — SIGNIFICANT CHANGE UP (ref 80–100)
PLATELET # BLD AUTO: 226 K/UL — SIGNIFICANT CHANGE UP (ref 150–400)
POTASSIUM SERPL-MCNC: 5.2 MMOL/L — SIGNIFICANT CHANGE UP (ref 3.5–5.3)
POTASSIUM SERPL-SCNC: 5.2 MMOL/L — SIGNIFICANT CHANGE UP (ref 3.5–5.3)
PROTHROM AB SERPL-ACNC: 45.5 SEC — HIGH (ref 10–13.1)
RBC # BLD: 2.95 M/UL — LOW (ref 3.8–5.2)
RBC # FLD: 16.1 % — HIGH (ref 10.3–14.5)
SODIUM SERPL-SCNC: 139 MMOL/L — SIGNIFICANT CHANGE UP (ref 135–145)
SPECIMEN SOURCE: SIGNIFICANT CHANGE UP
WBC # BLD: 6.48 K/UL — SIGNIFICANT CHANGE UP (ref 3.8–10.5)
WBC # FLD AUTO: 6.48 K/UL — SIGNIFICANT CHANGE UP (ref 3.8–10.5)

## 2018-01-29 PROCEDURE — 99233 SBSQ HOSP IP/OBS HIGH 50: CPT

## 2018-01-29 PROCEDURE — A9540: CPT

## 2018-01-29 PROCEDURE — 85610 PROTHROMBIN TIME: CPT

## 2018-01-29 PROCEDURE — 97116 GAIT TRAINING THERAPY: CPT

## 2018-01-29 PROCEDURE — 83735 ASSAY OF MAGNESIUM: CPT

## 2018-01-29 PROCEDURE — 80053 COMPREHEN METABOLIC PANEL: CPT

## 2018-01-29 PROCEDURE — 85027 COMPLETE CBC AUTOMATED: CPT

## 2018-01-29 PROCEDURE — 97162 PT EVAL MOD COMPLEX 30 MIN: CPT

## 2018-01-29 PROCEDURE — A9567: CPT

## 2018-01-29 PROCEDURE — P9011: CPT

## 2018-01-29 PROCEDURE — 86870 RBC ANTIBODY IDENTIFICATION: CPT

## 2018-01-29 PROCEDURE — 71250 CT THORAX DX C-: CPT

## 2018-01-29 PROCEDURE — 87798 DETECT AGENT NOS DNA AMP: CPT

## 2018-01-29 PROCEDURE — 84484 ASSAY OF TROPONIN QUANT: CPT

## 2018-01-29 PROCEDURE — 84145 PROCALCITONIN (PCT): CPT

## 2018-01-29 PROCEDURE — 84295 ASSAY OF SERUM SODIUM: CPT

## 2018-01-29 PROCEDURE — 97110 THERAPEUTIC EXERCISES: CPT

## 2018-01-29 PROCEDURE — 71045 X-RAY EXAM CHEST 1 VIEW: CPT

## 2018-01-29 PROCEDURE — 36430 TRANSFUSION BLD/BLD COMPNT: CPT

## 2018-01-29 PROCEDURE — 82550 ASSAY OF CK (CPK): CPT

## 2018-01-29 PROCEDURE — 87486 CHLMYD PNEUM DNA AMP PROBE: CPT

## 2018-01-29 PROCEDURE — 84100 ASSAY OF PHOSPHORUS: CPT

## 2018-01-29 PROCEDURE — 86902 BLOOD TYPE ANTIGEN DONOR EA: CPT

## 2018-01-29 PROCEDURE — 87086 URINE CULTURE/COLONY COUNT: CPT

## 2018-01-29 PROCEDURE — 96374 THER/PROPH/DIAG INJ IV PUSH: CPT

## 2018-01-29 PROCEDURE — 78582 LUNG VENTILAT&PERFUS IMAGING: CPT

## 2018-01-29 PROCEDURE — 86901 BLOOD TYPING SEROLOGIC RH(D): CPT

## 2018-01-29 PROCEDURE — 83605 ASSAY OF LACTIC ACID: CPT

## 2018-01-29 PROCEDURE — 94640 AIRWAY INHALATION TREATMENT: CPT

## 2018-01-29 PROCEDURE — 82435 ASSAY OF BLOOD CHLORIDE: CPT

## 2018-01-29 PROCEDURE — 93306 TTE W/DOPPLER COMPLETE: CPT

## 2018-01-29 PROCEDURE — 85730 THROMBOPLASTIN TIME PARTIAL: CPT

## 2018-01-29 PROCEDURE — 82330 ASSAY OF CALCIUM: CPT

## 2018-01-29 PROCEDURE — 82947 ASSAY GLUCOSE BLOOD QUANT: CPT

## 2018-01-29 PROCEDURE — 85014 HEMATOCRIT: CPT

## 2018-01-29 PROCEDURE — 86850 RBC ANTIBODY SCREEN: CPT

## 2018-01-29 PROCEDURE — 99285 EMERGENCY DEPT VISIT HI MDM: CPT | Mod: 25

## 2018-01-29 PROCEDURE — 87581 M.PNEUMON DNA AMP PROBE: CPT

## 2018-01-29 PROCEDURE — 82803 BLOOD GASES ANY COMBINATION: CPT

## 2018-01-29 PROCEDURE — 96375 TX/PRO/DX INJ NEW DRUG ADDON: CPT

## 2018-01-29 PROCEDURE — 87633 RESP VIRUS 12-25 TARGETS: CPT

## 2018-01-29 PROCEDURE — 73562 X-RAY EXAM OF KNEE 3: CPT

## 2018-01-29 PROCEDURE — 82962 GLUCOSE BLOOD TEST: CPT

## 2018-01-29 PROCEDURE — 84132 ASSAY OF SERUM POTASSIUM: CPT

## 2018-01-29 PROCEDURE — 81001 URINALYSIS AUTO W/SCOPE: CPT

## 2018-01-29 PROCEDURE — 82272 OCCULT BLD FECES 1-3 TESTS: CPT

## 2018-01-29 PROCEDURE — 87040 BLOOD CULTURE FOR BACTERIA: CPT

## 2018-01-29 PROCEDURE — 86922 COMPATIBILITY TEST ANTIGLOB: CPT

## 2018-01-29 PROCEDURE — 82553 CREATINE MB FRACTION: CPT

## 2018-01-29 PROCEDURE — 83880 ASSAY OF NATRIURETIC PEPTIDE: CPT

## 2018-01-29 PROCEDURE — 93970 EXTREMITY STUDY: CPT

## 2018-01-29 PROCEDURE — 80048 BASIC METABOLIC PNL TOTAL CA: CPT

## 2018-01-29 PROCEDURE — 93005 ELECTROCARDIOGRAM TRACING: CPT

## 2018-01-29 PROCEDURE — 86900 BLOOD TYPING SEROLOGIC ABO: CPT

## 2018-01-29 RX ORDER — ACETAMINOPHEN 500 MG
20.31 TABLET ORAL
Qty: 0 | Refills: 0 | COMMUNITY
Start: 2018-01-29

## 2018-01-29 RX ORDER — ALPRAZOLAM 0.25 MG
1 TABLET ORAL
Qty: 0 | Refills: 0 | COMMUNITY
Start: 2018-01-29

## 2018-01-29 RX ORDER — ACETAMINOPHEN 500 MG
2 TABLET ORAL
Qty: 0 | Refills: 0 | COMMUNITY
Start: 2018-01-29

## 2018-01-29 RX ORDER — CEFTRIAXONE 500 MG/1
2 INJECTION, POWDER, FOR SOLUTION INTRAMUSCULAR; INTRAVENOUS
Qty: 0 | Refills: 0 | COMMUNITY
Start: 2018-01-29

## 2018-01-29 RX ORDER — PANTOPRAZOLE SODIUM 20 MG/1
1 TABLET, DELAYED RELEASE ORAL
Qty: 0 | Refills: 0 | COMMUNITY
Start: 2018-01-29

## 2018-01-29 RX ORDER — ASPIRIN/CALCIUM CARB/MAGNESIUM 324 MG
1 TABLET ORAL
Qty: 0 | Refills: 0 | COMMUNITY
Start: 2018-01-29

## 2018-01-29 RX ORDER — LANOLIN ALCOHOL/MO/W.PET/CERES
1 CREAM (GRAM) TOPICAL
Qty: 0 | Refills: 0 | COMMUNITY
Start: 2018-01-29

## 2018-01-29 RX ORDER — ALPRAZOLAM 0.25 MG
1 TABLET ORAL
Qty: 0 | Refills: 0 | COMMUNITY

## 2018-01-29 RX ORDER — BUDESONIDE AND FORMOTEROL FUMARATE DIHYDRATE 160; 4.5 UG/1; UG/1
2 AEROSOL RESPIRATORY (INHALATION)
Qty: 0 | Refills: 0 | COMMUNITY
Start: 2018-01-29

## 2018-01-29 RX ORDER — LACTOBACILLUS ACIDOPHILUS 100MM CELL
1 CAPSULE ORAL
Qty: 0 | Refills: 0 | COMMUNITY

## 2018-01-29 RX ORDER — OXYCODONE HYDROCHLORIDE 5 MG/1
1 TABLET ORAL
Qty: 0 | Refills: 0 | COMMUNITY
Start: 2018-01-29

## 2018-01-29 RX ORDER — OXYCODONE HYDROCHLORIDE 5 MG/1
1 TABLET ORAL
Qty: 0 | Refills: 0 | COMMUNITY

## 2018-01-29 RX ORDER — LISINOPRIL 2.5 MG/1
1 TABLET ORAL
Qty: 0 | Refills: 0 | COMMUNITY
Start: 2018-01-29

## 2018-01-29 RX ORDER — ACETAMINOPHEN 500 MG
2 TABLET ORAL
Qty: 0 | Refills: 0 | COMMUNITY

## 2018-01-29 RX ORDER — SIMVASTATIN 20 MG/1
1 TABLET, FILM COATED ORAL
Qty: 0 | Refills: 0 | COMMUNITY
Start: 2018-01-29

## 2018-01-29 RX ORDER — COLLAGENASE CLOSTRIDIUM HIST. 250 UNIT/G
1 OINTMENT (GRAM) TOPICAL
Qty: 0 | Refills: 0 | COMMUNITY
Start: 2018-01-29

## 2018-01-29 RX ORDER — LOPERAMIDE HCL 2 MG
0 TABLET ORAL
Qty: 0 | Refills: 0 | COMMUNITY

## 2018-01-29 RX ORDER — LACTOBACILLUS ACIDOPHILUS 100MM CELL
1 CAPSULE ORAL
Qty: 0 | Refills: 0 | COMMUNITY
Start: 2018-01-29

## 2018-01-29 RX ORDER — NYSTATIN CREAM 100000 [USP'U]/G
1 CREAM TOPICAL
Qty: 0 | Refills: 0 | COMMUNITY

## 2018-01-29 RX ORDER — SODIUM CHLORIDE 0.65 %
1 AEROSOL, SPRAY (ML) NASAL
Qty: 0 | Refills: 0 | COMMUNITY
Start: 2018-01-29

## 2018-01-29 RX ADMIN — Medication 1 SPRAY(S): at 05:33

## 2018-01-29 RX ADMIN — LISINOPRIL 10 MILLIGRAM(S): 2.5 TABLET ORAL at 05:33

## 2018-01-29 RX ADMIN — Medication 1 TABLET(S): at 12:20

## 2018-01-29 RX ADMIN — Medication 81 MILLIGRAM(S): at 12:20

## 2018-01-29 RX ADMIN — OXYCODONE HYDROCHLORIDE 5 MILLIGRAM(S): 5 TABLET ORAL at 13:26

## 2018-01-29 RX ADMIN — PANTOPRAZOLE SODIUM 40 MILLIGRAM(S): 20 TABLET, DELAYED RELEASE ORAL at 05:33

## 2018-01-29 RX ADMIN — ENOXAPARIN SODIUM 120 MILLIGRAM(S): 100 INJECTION SUBCUTANEOUS at 05:33

## 2018-01-29 RX ADMIN — Medication 325 MILLIGRAM(S): at 12:20

## 2018-01-29 RX ADMIN — Medication 1 APPLICATION(S): at 09:12

## 2018-01-29 RX ADMIN — OXYCODONE HYDROCHLORIDE 5 MILLIGRAM(S): 5 TABLET ORAL at 12:30

## 2018-01-29 RX ADMIN — BUDESONIDE AND FORMOTEROL FUMARATE DIHYDRATE 2 PUFF(S): 160; 4.5 AEROSOL RESPIRATORY (INHALATION) at 05:32

## 2018-01-29 RX ADMIN — Medication 0.25 MILLIGRAM(S): at 09:12

## 2018-01-29 RX ADMIN — Medication 2.5 MILLIGRAM(S): at 05:33

## 2018-01-29 NOTE — PROGRESS NOTE ADULT - PROBLEM SELECTOR PLAN 1
coumadin daily dosing, goal 2-3, hold coumadin today  appreciate ortho and vascular recs, pt will fu outpt for IVC filter for upcoming rt hip procedure

## 2018-01-29 NOTE — DISCHARGE NOTE ADULT - HOSPITAL COURSE
77 year old felame PMH CAD s/p HERBERT, severe AS, MVR, PMR on chronic steroids, asthma, OA, s/p PPM s/p TKR with recent joint infection s/p explant and abx spacer; admitted with fever and hypoxia. 77 year old felame PMH CAD s/p HERBERT, severe AS, MVR, PMR on chronic steroids, asthma, OA, s/p PPM s/p TKR with recent joint infection s/p explant and abx spacer; admitted with fever and hypoxia.  Blood cultures and urine cultures negative.  ID consulted, patient to continue course of IV antibiotics to complete 42 days; upon completion will transition to po ceftin.  Follow up outpatient with Dr Cannon. 77 year old felame PMH CAD s/p HERBERT, severe AS, MVR, PMR on chronic steroids, asthma, OA, s/p PPM s/p TKR with recent joint infection s/p explant and abx spacer; admitted with fever and hypoxia.  Blood cultures and urine cultures negative.  ID consulted, patient to continue course of IV antibiotics to complete 42 days; upon completion will transition to po ceftin.  CBC weekly while on antibiotics. Follow up outpatient with Dr Cannon.  Found to have RLE DVT, started on Coumadin daily.  Will need IVC filter placement by Dr Berg outpatient.  Evaluated by ortho, Dr Bass, no obvious signs of infection, partial weight bearing on RLE.  Will need Right knee revision in 6 weeks, follow up with Dr Bass. Evaluated by physical therapy, recommended for rehab.  Patient stable for discharge to rehab.

## 2018-01-29 NOTE — DISCHARGE NOTE ADULT - MEDICATION SUMMARY - MEDICATIONS TO STOP TAKING
I will STOP taking the medications listed below when I get home from the hospital:    Advair Diskus 250 mcg-50 mcg inhalation powder  -- 1 puff(s) inhaled 2 times a day    nystatin 100,000 units/g topical ointment  -- Apply on skin to affected area 2 times a day under breasts    loperamide

## 2018-01-29 NOTE — PROGRESS NOTE ADULT - ATTENDING COMMENTS
as above--Hypoxemia (obesity hypoventilation, PE, atelectasis, asthma, Cardiac disease)    optimize resp regimen w/ bronchodilators and Neb rx; AC initiated as pt now has DVT and likely PE (balance INR 2-3)  --OOB as able   echo done--no right heart strain   ABX as per ID  Rehab pending    Everett Kumar MD-Pulmonary   [phone]

## 2018-01-29 NOTE — DISCHARGE NOTE ADULT - CARE PROVIDERS DIRECT ADDRESSES
,DirectAddress_Unknown,[email or web address],DirectAddress_Unknown

## 2018-01-29 NOTE — PROGRESS NOTE ADULT - ASSESSMENT
76 yo F with a h/o AS s/p TAVR and PPM 1 year ago, asthma on Advair, obesity, CAD, PMR on chronic prednisone at 2.5 mg, remote h/o b/l TKR, recent admission to [facility] in Dec for strept sanguinous bacteremia with knee seeding, s/o right knee explant on 12/23 and on long term Ceftriaxone therapy. Discharged to SNF on 1/2/18. Brought to ED on 1/23 for low grade fever and found to be hypoxemic to 91%, improved with supplemental O2.  CT chest non-con: b/l very small pleural effusions with adjacent atelectasis.   V/Q: low probability for PE---pos doppler c/w DVT  WBC 14.6 and YONATAN  Antibiotics switched to Cefepime   Inhalers: Symbicort and Duonebs  Denies any symptoms of cough, shortness of breath, chest tightness or wheezing. Denies nasal congestion. Has been sedentary for the majority of the past month 2/2 illness and recent right knee surgery.  Tmax 99.5    On exam, O2 sats range 89-91% on RA, improves to 94% with deep breathing techniques.    A/P: Hypoxemia - likely from a combination of MYAH/OHS, bibasilar atelectasis. At this time, no bronchospasm on exam and has no respiratory symptoms, I do not believe this is pneumonia.   However agree with repeating TTE to evaluate valves and chambers for shunt as well as LE dopplers c/w DVT and pt has likely had PE inspite of VQ results  Continue with supplemental O2, goal sats in the low 90s. Out of bed to chair  Incentive spirometer hourly - reviewed technique with patient with teachback.   C/W Symbicort and bronchodilators as needed.  Blood cultures negative   DVT prophylaxis    Thank you for the consult.

## 2018-01-29 NOTE — PROGRESS NOTE ADULT - ATTENDING COMMENTS
I agree with the above note on this patient. All pertinent films have been reviewed. Please refer to clinical documentation of the history, physical examinations, data summary, and both assessment and plan as documented above and with which I agree.    Nate Bass MD  Attending Orthopedic Surgeon I agree with the above note and have personally seen and examined this patient. All pertinent films have been reviewed. Please refer to clinical documentation of the history, physical examinations, data summary, and both assessment and plan as documented above and with which I agree.    Nate Bass MD  Attending Orthopedic Surgeon

## 2018-01-29 NOTE — PROGRESS NOTE ADULT - SUBJECTIVE AND OBJECTIVE BOX
Pt S/E at bedside, no acute events overnight, pain controlled    Vital Signs Last 24 Hrs  T(C): 37.2 (01-29-18 @ 04:32), Max: 37.2 (01-28-18 @ 20:15)  T(F): 98.9 (01-29-18 @ 04:32), Max: 98.9 (01-28-18 @ 20:15)  HR: 74 (01-29-18 @ 04:32) (74 - 85)  BP: 161/67 (01-29-18 @ 04:32) (116/75 - 161/67)  BP(mean): --  RR: 19 (01-29-18 @ 04:32) (18 - 20)  SpO2: 93% (01-29-18 @ 04:32) (92% - 94%)                          8.1    7.53  )-----------( 192      ( 28 Jan 2018 08:11 )             26.3   01-28    138  |  99  |  22  ----------------------------<  93  5.2   |  25  |  1.01    Ca    8.9      28 Jan 2018 08:32    TPro  6.7  /  Alb  2.4<L>  /  TBili  <0.2  /  DBili  x   /  AST  18  /  ALT  10  /  AlkPhos  96  01-27      Gen: NAD, AAOx3    Right Lower Extremity:  KI present  Dressing clean dry intact  +EHL/FHL/TA/GS  SILT L3-S1  +DP/PT Pulses  Compartments soft  No calf TTP B/L

## 2018-01-29 NOTE — DISCHARGE NOTE ADULT - PLAN OF CARE
You have been started on Coumadin, 3 mg po daily.  Do not take any Coumadin tonight (you INR was 3.92 today).   Follow up with Dr Berg (vascular surgeon) in 1-2 weeks.  You will need to have an IVC filter placed prior to having your orthopedic surgery.  Take your "blood thinners" as prescribed.  Walking is encouraged, increase activity as tolerated.  If you develop new leg pain, swelling, and/or redness contact your healthcare provider.  If you develop new chest pain with difficulty breathing, a rapid heart rate and/or a feeling of passing out call emergency medical services 911. Take all your medications as prescribed by your physician.  If you develop chest pain or shortness of breath, please go to your nearest emergency room and contact your physician. HOME CARE INSTRUCTIONS  Take medicines as directed by your caregiver.  Control your home environment in the following ways to help prevent asthma attacks:  Change your heating and air conditioning filter at least once a month.  Do not smoke. Do not stay in places where others are smoking.  Get rid of pests (such as roaches and mice) and their droppings.  If you see mold on a plant, throw it away.  Clean your floors and dust every week. Use unscented cleaning products. Use a vacuum  with a HEPA filter if possible. If vacuuming or cleaning triggers your asthma, try to find someone else to do these chores..  Use allergy-proof pillows, mattress covers, and box spring covers.  Wash bedsheets and blankets every week in hot water and dry in a dryer.  Use a blanket that is made of polyester or cotton with a tight nap.  Clean bathrooms and elio with bleach and repaint with mold-resistant paint.   Wash hands frequently.  Talk to your caregiver about an action plan for managing asthma attacks. This includes the use of a peak flow meter which measures the severity of the attack and medicines that can help stop the attack. An action plan can help minimize or stop the attack without having to seek medical care.  Always have a plan prepared for seeking medical attention. This should include contacting your caregiver and in the case of a severe attack, calling your local emergency services. Now resolved   Avoid taking (NSAIDs) - (ex: Ibuprofen, Advil, Celebrex, Naprosyn)  Avoid taking any nephrotoxic agents (can harm kidneys) - Intravenous contrast for diagnostic testing, combination cold medications.  Have all medications adjusted for your renal function by your Health Care Provider.  Blood pressure control is important.  Take all medication as prescribed. Coronary artery disease is a condition where the arteries the supply the heart muscle get clogged with fatty deposits & puts you at risk for a heart attack  Call your doctor if you have any new pain, pressure, or discomfort in the center of your chest, pain, tingling or discomfort in arms, back, neck, jaw, or stomach, shortness of breath, nausea, vomiting, burping or heartburn, sweating, cold and clammy skin, racing or abnormal heartbeat for more than 10 minutes or if they keep coming & going.  Call 911 and do not tr to get to hospital by care  You can help yourself with lifestyle changes (quitting smoking if you smoke), eat lots of fruits & vegetables & low fat dairy products, not a lot of meat & fatty foods, walk or some form of physical activity most days of the week, lose weight if you are overweight  Take your cardiac medication as prescribed to lower cholesterol, to lower blood pressure, aspirin to prevent blood clots, and diabetes control  Make sure to keep appointments with doctor for cardiac follow up care Right knee Wound care - apply collagenase and cover with telfa and abd pad to distal aspect of incision 2 times a day  Follow up with Dr Bass, call to make an appointment.   You will need to have another orthopedic surgery in 6 weeks.  Continue IV antibiotics for another 5 days and then transition to oral.  Follow up with Dr Cannon in 2-4 weeks, call to make an appointment.  Weekly CBCs while on antibiotics. To remain symptom free

## 2018-01-29 NOTE — PROGRESS NOTE ADULT - PROBLEM SELECTOR PROBLEM 9
Elevated troponin
Elevated troponin
DVT (deep venous thrombosis)
DVT (deep venous thrombosis)
Elevated troponin
Elevated troponin
DVT (deep venous thrombosis)
YONATAN (acute kidney injury)

## 2018-01-29 NOTE — PROGRESS NOTE ADULT - PROBLEM SELECTOR PROBLEM 10
YONATAN (acute kidney injury)
Status post total knee replacement, unspecified laterality

## 2018-01-29 NOTE — DISCHARGE NOTE ADULT - PATIENT PORTAL LINK FT
“You can access the [facility] Patient Portal, offered by [facility], by registering with the following website: [email or web address]”

## 2018-01-29 NOTE — PROGRESS NOTE ADULT - PROVIDER SPECIALTY LIST ADULT
Infectious Disease
Internal Medicine
Internal Medicine
Orthopedics
Pulmonology
Internal Medicine
Internal Medicine
Pulmonology
Internal Medicine

## 2018-01-29 NOTE — PROGRESS NOTE ADULT - SUBJECTIVE AND OBJECTIVE BOX
CHIEF COMPLAINT: less back pain--RLL edema--"may go to rehab"    Interval Events: OOB-minimally    REVIEW OF SYSTEMS:  Constitutional: No fevers or chills. No weight loss. No fatigue or generalized malaise.  Eyes: No itching or discharge from the eyes  ENT: No ear pain. No ear discharge. No nasal congestion. No post nasal drip. No epistaxis. No throat pain. No sore throat. No difficulty swallowing.   CV: No chest pain. No palpitations. No lightheadedness or dizziness.   Resp: No dyspnea at rest. ++ dyspnea on exertion. No orthopnea. No wheezing. No cough. No stridor. No sputum production. No chest pain with respiration.  GI: No nausea. No vomiting. No diarrhea.  MSK: No joint pain or pain in any extremities  Integumentary: No skin lesions. ++ pedal edema.  Neurological: No gross motor weakness. No sensory changes.  [+ ] All other systems negative  [ ] Unable to assess ROS because ________    OBJECTIVE:  ICU Vital Signs Last 24 Hrs  T(C): 37.2 (29 Jan 2018 04:32), Max: 37.2 (28 Jan 2018 20:15)  T(F): 98.9 (29 Jan 2018 04:32), Max: 98.9 (28 Jan 2018 20:15)  HR: 74 (29 Jan 2018 04:32) (74 - 85)  BP: 161/67 (29 Jan 2018 04:32) (116/75 - 161/67)  BP(mean): --  ABP: --  ABP(mean): --  RR: 19 (29 Jan 2018 04:32) (18 - 20)  SpO2: 93% (29 Jan 2018 04:32) (92% - 94%)        01-27 @ 07:01 - 01-28 @ 07:00  --------------------------------------------------------  IN: 770 mL / OUT: 700 mL / NET: 70 mL    01-28 @ 07:01 - 01-29 @ 05:57  --------------------------------------------------------  IN: 705 mL / OUT: 300 mL / NET: 405 mL      CAPILLARY BLOOD GLUCOSE      POCT Blood Glucose.: 101 mg/dL (27 Jan 2018 12:07)      PHYSICAL EXAM: NAD in bed  General: Awake, alert, oriented X 3.   HEENT: Atraumatic, normocephalic.                 Mallampatti Grade 3                No nasal congestion.                No tonsillar or pharyngeal exudates.  Lymph Nodes: No palpable lymphadenopathy  Neck: No JVD. No carotid bruit.   Respiratory: Normal chest expansion                         Normal percussion                         Normal and equal air entry                         No wheeze, rhonchi or rales.  Cardiovascular: S1 S2 normal. No murmurs, rubs or gallops.   Abdomen: Soft, non-tender, non-distended. No organomegaly.  Extremities: Warm to touch. Peripheral pulse palpable. No pedal edema. right leg wrapped  Skin: No rashes or skin lesions  Neurological: Motor and sensory examination equal and normal in all four extremities.  Psychiatry: Appropriate mood and affect.    HOSPITAL MEDICATIONS:  MEDICATIONS  (STANDING):  aspirin enteric coated 81 milliGRAM(s) Oral daily  buDESOnide 160 MICROgram(s)/formoterol 4.5 MICROgram(s) Inhaler 2 Puff(s) Inhalation two times a day  cefTRIAXone   IVPB 2 Gram(s) IV Intermittent every 24 hours  collagenase Ointment 1 Application(s) Topical two times a day  enoxaparin Injectable 120 milliGRAM(s) SubCutaneous two times a day  ferrous    sulfate 325 milliGRAM(s) Oral daily  lactobacillus acidophilus 1 Tablet(s) Oral daily  lisinopril 10 milliGRAM(s) Oral daily  melatonin 1 milliGRAM(s) Oral at bedtime  multivitamin 1 Tablet(s) Oral daily  pantoprazole    Tablet 40 milliGRAM(s) Oral before breakfast  predniSONE   Tablet 2.5 milliGRAM(s) Oral daily  simvastatin 20 milliGRAM(s) Oral at bedtime  sodium chloride 0.65% Nasal 1 Spray(s) Both Nostrils three times a day    MEDICATIONS  (PRN):  acetaminophen    Suspension 650 milliGRAM(s) Oral every 6 hours PRN For Temp greater than 38 C (100.4 F)  acetaminophen   Tablet. 650 milliGRAM(s) Oral every 6 hours PRN Mild Pain (1 - 3)  ALPRAZolam 0.25 milliGRAM(s) Oral daily PRN anxiety  melatonin 3 milliGRAM(s) Oral at bedtime PRN Insomnia  oxyCODONE    IR 5 milliGRAM(s) Oral every 6 hours PRN Moderate Pain (4 - 6)      LABS:                        8.1    7.53  )-----------( 192      ( 28 Jan 2018 08:11 )             26.3     01-28    138  |  99  |  22  ----------------------------<  93  5.2   |  25  |  1.01    Ca    8.9      28 Jan 2018 08:32    TPro  6.7  /  Alb  2.4<L>  /  TBili  <0.2  /  DBili  x   /  AST  18  /  ALT  10  /  AlkPhos  96  01-27    PT/INR - ( 28 Jan 2018 08:30 )   PT: 38.8 sec;   INR: 3.35 ratio         PTT - ( 28 Jan 2018 08:30 )  PTT:52.1 sec          MICROBIOLOGY:     RADIOLOGY:  < from: Transthoracic Echocardiogram (01.25.18 @ 14:10) >  Conclusions:  1. Mitral annular calcification. Mild mitral regurgitation.    2. Transcatheter aortic valve replacement. The valve is  well seated.  Peak transaortic valve gradient equals 36 mm  Hg, mean transaortic valve gradient equals 19 mm Hg, which  is probably normal in the presence of a transcatheter  aortic valve replacement. No valvular aortic valve  regurgitation and minimal paravalvular aortic  regurgitation.  3. Severely dilated left atrium.  LA volume index = 50  cc/m2.  4. Normal left ventricular systolic function. The basal  inferolateral wall, and the basal inferior wall are mildly  hypokinetic.  5. Moderate diastolic dysfunction (Stage II).  6. Right ventricular enlargement with normal right  ventricular systolic function. A device wire is noted in  the right heart.  7. Tricuspid valve not well visualized. Moderate tricuspid  regurgitation.    < end of copied text >    [ ] Reviewed and interpreted by me    Point of Care Ultrasound Findings:    PFT:    EKG:

## 2018-01-29 NOTE — PROGRESS NOTE ADULT - SUBJECTIVE AND OBJECTIVE BOX
Patient is a 77y old  Female who presents with a chief complaint of lethargy (23 Jan 2018 21:09)      SUBJECTIVE / OVERNIGHT EVENTS:    Patient seen and examined. denies CP SOB. emotional today. wants to go to rehab.      Vital Signs Last 24 Hrs  T(C): 37.2 (29 Jan 2018 04:32), Max: 37.2 (28 Jan 2018 20:15)  T(F): 98.9 (29 Jan 2018 04:32), Max: 98.9 (28 Jan 2018 20:15)  HR: 74 (29 Jan 2018 04:32) (74 - 85)  BP: 161/67 (29 Jan 2018 04:32) (116/75 - 161/67)  BP(mean): --  RR: 19 (29 Jan 2018 04:32) (18 - 20)  SpO2: 93% (29 Jan 2018 04:32) (92% - 94%)  I&O's Summary    28 Jan 2018 07:01  -  29 Jan 2018 07:00  --------------------------------------------------------  IN: 705 mL / OUT: 300 mL / NET: 405 mL    29 Jan 2018 07:01  -  29 Jan 2018 09:32  --------------------------------------------------------  IN: 240 mL / OUT: 0 mL / NET: 240 mL        PE:  GENERAL: NAD, AAOx3, obese  HEAD:  Atraumatic, Normocephalic  EYES: EOMI, PERRLA, conjunctiva and sclera clear  NECK: Supple, No JVD  CHEST/LUNG: CTABL, No wheeze  HEART: Regular rate and rhythm; no murmur  ABDOMEN: Soft, Nontender, Nondistended; Bowel sounds present  EXTREMITIES:  2+ Peripheral Pulses, right leg in brace  SKIN: No rashes or lesions  NEURO: No focal deficits    LABS:                        8.1    6.48  )-----------( 226      ( 29 Jan 2018 07:33 )             26.5     01-29    139  |  101  |  17  ----------------------------<  97  5.2   |  25  |  0.86    Ca    8.6      29 Jan 2018 07:33      PT/INR - ( 29 Jan 2018 07:33 )   PT: 45.5 sec;   INR: 3.92 ratio         PTT - ( 28 Jan 2018 08:30 )  PTT:52.1 sec  CAPILLARY BLOOD GLUCOSE                RADIOLOGY & ADDITIONAL TESTS:    Imaging Personally Reviewed:  [x] YES  [ ] NO    Consultant(s) Notes Reviewed:  [x] YES  [ ] NO    MEDICATIONS  (STANDING):  aspirin enteric coated 81 milliGRAM(s) Oral daily  buDESOnide 160 MICROgram(s)/formoterol 4.5 MICROgram(s) Inhaler 2 Puff(s) Inhalation two times a day  cefTRIAXone   IVPB 2 Gram(s) IV Intermittent every 24 hours  collagenase Ointment 1 Application(s) Topical two times a day  ferrous    sulfate 325 milliGRAM(s) Oral daily  lactobacillus acidophilus 1 Tablet(s) Oral daily  lisinopril 10 milliGRAM(s) Oral daily  melatonin 1 milliGRAM(s) Oral at bedtime  multivitamin 1 Tablet(s) Oral daily  pantoprazole    Tablet 40 milliGRAM(s) Oral before breakfast  predniSONE   Tablet 2.5 milliGRAM(s) Oral daily  simvastatin 20 milliGRAM(s) Oral at bedtime  sodium chloride 0.65% Nasal 1 Spray(s) Both Nostrils three times a day    MEDICATIONS  (PRN):  acetaminophen    Suspension 650 milliGRAM(s) Oral every 6 hours PRN For Temp greater than 38 C (100.4 F)  acetaminophen   Tablet. 650 milliGRAM(s) Oral every 6 hours PRN Mild Pain (1 - 3)  ALPRAZolam 0.25 milliGRAM(s) Oral daily PRN anxiety  melatonin 3 milliGRAM(s) Oral at bedtime PRN Insomnia  oxyCODONE    IR 5 milliGRAM(s) Oral every 6 hours PRN Moderate Pain (4 - 6)      Care Discussed with Consultants/Other Providers [x] YES  [ ] NO    HEALTH ISSUES - PROBLEM Dx:  Acute deep vein thrombosis (DVT) of right lower extremity, unspecified vein: Acute deep vein thrombosis (DVT) of right lower extremity, unspecified vein  DVT (deep venous thrombosis): DVT (deep venous thrombosis)  Obesity: Obesity  Prophylactic measure: Prophylactic measure  CAD (coronary artery disease): CAD (coronary artery disease)  Atelectasis of both lungs: Atelectasis of both lungs  Hypoxemia: Hypoxemia  Asthma: Asthma  SOB (shortness of breath): SOB (shortness of breath)  Metabolic encephalopathy: Metabolic encephalopathy  Fever: Fever  Hypoxia: Hypoxia  Status post total knee replacement, unspecified laterality: Status post total knee replacement, unspecified laterality  YONATAN (acute kidney injury): YONATAN (acute kidney injury)  Elevated troponin: Elevated troponin  Gastroesophageal reflux disease, esophagitis presence not specified: Gastroesophageal reflux disease, esophagitis presence not specified  Asthma, unspecified asthma severity, unspecified whether complicated, unspecified whether persistent: Asthma, unspecified asthma severity, unspecified whether complicated, unspecified whether persistent  Polymyalgia: Polymyalgia  Aortic stenosis, severe: Aortic stenosis, severe  Pneumonia due to infectious organism, unspecified laterality, unspecified part of lung: Pneumonia due to infectious organism, unspecified laterality, unspecified part of lung

## 2018-01-29 NOTE — DISCHARGE NOTE ADULT - CARE PLAN
Principal Discharge DX:	Acute deep vein thrombosis (DVT) of right lower extremity, unspecified vein  Assessment and plan of treatment:	You have been started on Coumadin, 3 mg po daily.  Do not take any Coumadin tonight (you INR was 3.92 today).   Follow up with Dr Berg (vascular surgeon) in 1-2 weeks.  You will need to have an IVC filter placed prior to having your orthopedic surgery.  Take your "blood thinners" as prescribed.  Walking is encouraged, increase activity as tolerated.  If you develop new leg pain, swelling, and/or redness contact your healthcare provider.  If you develop new chest pain with difficulty breathing, a rapid heart rate and/or a feeling of passing out call emergency medical services 911.  Secondary Diagnosis:	Aortic stenosis, severe  Assessment and plan of treatment:	Take all your medications as prescribed by your physician.  If you develop chest pain or shortness of breath, please go to your nearest emergency room and contact your physician.  Secondary Diagnosis:	Asthma  Assessment and plan of treatment:	HOME CARE INSTRUCTIONS  Take medicines as directed by your caregiver.  Control your home environment in the following ways to help prevent asthma attacks:  Change your heating and air conditioning filter at least once a month.  Do not smoke. Do not stay in places where others are smoking.  Get rid of pests (such as roaches and mice) and their droppings.  If you see mold on a plant, throw it away.  Clean your floors and dust every week. Use unscented cleaning products. Use a vacuum  with a HEPA filter if possible. If vacuuming or cleaning triggers your asthma, try to find someone else to do these chores..  Use allergy-proof pillows, mattress covers, and box spring covers.  Wash bedsheets and blankets every week in hot water and dry in a dryer.  Use a blanket that is made of polyester or cotton with a tight nap.  Clean bathrooms and elio with bleach and repaint with mold-resistant paint.   Wash hands frequently.  Talk to your caregiver about an action plan for managing asthma attacks. This includes the use of a peak flow meter which measures the severity of the attack and medicines that can help stop the attack. An action plan can help minimize or stop the attack without having to seek medical care.  Always have a plan prepared for seeking medical attention. This should include contacting your caregiver and in the case of a severe attack, calling your local emergency services.  Secondary Diagnosis:	YONATAN (acute kidney injury)  Assessment and plan of treatment:	Now resolved   Avoid taking (NSAIDs) - (ex: Ibuprofen, Advil, Celebrex, Naprosyn)  Avoid taking any nephrotoxic agents (can harm kidneys) - Intravenous contrast for diagnostic testing, combination cold medications.  Have all medications adjusted for your renal function by your Health Care Provider.  Blood pressure control is important.  Take all medication as prescribed.  Secondary Diagnosis:	CAD (coronary artery disease)  Assessment and plan of treatment:	Coronary artery disease is a condition where the arteries the supply the heart muscle get clogged with fatty deposits & puts you at risk for a heart attack  Call your doctor if you have any new pain, pressure, or discomfort in the center of your chest, pain, tingling or discomfort in arms, back, neck, jaw, or stomach, shortness of breath, nausea, vomiting, burping or heartburn, sweating, cold and clammy skin, racing or abnormal heartbeat for more than 10 minutes or if they keep coming & going.  Call 911 and do not tr to get to hospital by care  You can help yourself with lifestyle changes (quitting smoking if you smoke), eat lots of fruits & vegetables & low fat dairy products, not a lot of meat & fatty foods, walk or some form of physical activity most days of the week, lose weight if you are overweight  Take your cardiac medication as prescribed to lower cholesterol, to lower blood pressure, aspirin to prevent blood clots, and diabetes control  Make sure to keep appointments with doctor for cardiac follow up care  Secondary Diagnosis:	Status post total knee replacement, unspecified laterality  Assessment and plan of treatment:	Right knee Wound care - apply collagenase and cover with telfa and abd pad to distal aspect of incision 2 times a day  Follow up with Dr Bass, call to make an appointment.   You will need to have another orthopedic surgery in 6 weeks.  Continue IV antibiotics for another 5 days and then transition to oral.  Follow up with Dr Cannon in 2-4 weeks, call to make an appointment.  Weekly CBCs while on antibiotics. Principal Discharge DX:	Acute deep vein thrombosis (DVT) of right lower extremity, unspecified vein  Goal:	To remain symptom free  Assessment and plan of treatment:	You have been started on Coumadin, 3 mg po daily.  Do not take any Coumadin tonight (you INR was 3.92 today).   Follow up with Dr Berg (vascular surgeon) in 1-2 weeks.  You will need to have an IVC filter placed prior to having your orthopedic surgery.  Take your "blood thinners" as prescribed.  Walking is encouraged, increase activity as tolerated.  If you develop new leg pain, swelling, and/or redness contact your healthcare provider.  If you develop new chest pain with difficulty breathing, a rapid heart rate and/or a feeling of passing out call emergency medical services 911.  Secondary Diagnosis:	Aortic stenosis, severe  Assessment and plan of treatment:	Take all your medications as prescribed by your physician.  If you develop chest pain or shortness of breath, please go to your nearest emergency room and contact your physician.  Secondary Diagnosis:	Asthma  Assessment and plan of treatment:	HOME CARE INSTRUCTIONS  Take medicines as directed by your caregiver.  Control your home environment in the following ways to help prevent asthma attacks:  Change your heating and air conditioning filter at least once a month.  Do not smoke. Do not stay in places where others are smoking.  Get rid of pests (such as roaches and mice) and their droppings.  If you see mold on a plant, throw it away.  Clean your floors and dust every week. Use unscented cleaning products. Use a vacuum  with a HEPA filter if possible. If vacuuming or cleaning triggers your asthma, try to find someone else to do these chores..  Use allergy-proof pillows, mattress covers, and box spring covers.  Wash bedsheets and blankets every week in hot water and dry in a dryer.  Use a blanket that is made of polyester or cotton with a tight nap.  Clean bathrooms and elio with bleach and repaint with mold-resistant paint.   Wash hands frequently.  Talk to your caregiver about an action plan for managing asthma attacks. This includes the use of a peak flow meter which measures the severity of the attack and medicines that can help stop the attack. An action plan can help minimize or stop the attack without having to seek medical care.  Always have a plan prepared for seeking medical attention. This should include contacting your caregiver and in the case of a severe attack, calling your local emergency services.  Secondary Diagnosis:	YONATAN (acute kidney injury)  Assessment and plan of treatment:	Now resolved   Avoid taking (NSAIDs) - (ex: Ibuprofen, Advil, Celebrex, Naprosyn)  Avoid taking any nephrotoxic agents (can harm kidneys) - Intravenous contrast for diagnostic testing, combination cold medications.  Have all medications adjusted for your renal function by your Health Care Provider.  Blood pressure control is important.  Take all medication as prescribed.  Secondary Diagnosis:	CAD (coronary artery disease)  Assessment and plan of treatment:	Coronary artery disease is a condition where the arteries the supply the heart muscle get clogged with fatty deposits & puts you at risk for a heart attack  Call your doctor if you have any new pain, pressure, or discomfort in the center of your chest, pain, tingling or discomfort in arms, back, neck, jaw, or stomach, shortness of breath, nausea, vomiting, burping or heartburn, sweating, cold and clammy skin, racing or abnormal heartbeat for more than 10 minutes or if they keep coming & going.  Call 911 and do not tr to get to hospital by care  You can help yourself with lifestyle changes (quitting smoking if you smoke), eat lots of fruits & vegetables & low fat dairy products, not a lot of meat & fatty foods, walk or some form of physical activity most days of the week, lose weight if you are overweight  Take your cardiac medication as prescribed to lower cholesterol, to lower blood pressure, aspirin to prevent blood clots, and diabetes control  Make sure to keep appointments with doctor for cardiac follow up care  Secondary Diagnosis:	Status post total knee replacement, unspecified laterality  Assessment and plan of treatment:	Right knee Wound care - apply collagenase and cover with telfa and abd pad to distal aspect of incision 2 times a day  Follow up with Dr Bass, call to make an appointment.   You will need to have another orthopedic surgery in 6 weeks.  Continue IV antibiotics for another 5 days and then transition to oral.  Follow up with Dr Cannon in 2-4 weeks, call to make an appointment.  Weekly CBCs while on antibiotics.

## 2018-01-29 NOTE — PROGRESS NOTE ADULT - PROBLEM SELECTOR PLAN 3
No clearcut source except perhaps DVT or atelectasis  Doubt her rt knee as source of infection  on ceftriaxone empirically for 6 weeks via picc, then ceftin 500mg BID PO  appreciate ID

## 2018-01-29 NOTE — PROGRESS NOTE ADULT - ASSESSMENT
Assessment and Plan:   · Assessment		  A/P: 77F s/p Stage 1 Revision TKA readmitted with fevers, SOB    pain control  PWB RLE  c/w daily q2 dressing changes  Therapeutic lovenox for anticoagulation bridge to coumadin likely dc Th L today with INR therapeutic currently, will defer to medicine  IVCF out pt before 2nd staged surgery  c/w abx, FU ID  FU Labs  dc planning per medicine Assessment and Plan:   · Assessment		  A/P: 77F s/p Stage 1 Revision TKA readmitted with fevers, SOB    pain control  PWB RLE  c/w daily q2 dressing changes  Therapeutic lovenox for anticoagulation bridge to coumadin likely dc Th L today with INR therapeutic currently, will defer to medicine  IVCF out pt before 2nd staged surgery  c/w abx, FU ID  FU Labs  dc planning per medicine  ortho stable

## 2018-01-29 NOTE — DISCHARGE NOTE ADULT - SECONDARY DIAGNOSIS.
Aortic stenosis, severe Asthma YONATAN (acute kidney injury) CAD (coronary artery disease) Status post total knee replacement, unspecified laterality

## 2018-01-29 NOTE — DISCHARGE NOTE ADULT - MEDICATION SUMMARY - MEDICATIONS TO TAKE
I will START or STAY ON the medications listed below when I get home from the hospital:    predniSONE 2.5 mg oral tablet  -- 1 tab(s) by mouth once a day  -- Indication: For Steroids    acetaminophen 160 mg/5 mL oral suspension  -- 20.31 milliliter(s) by mouth every 6 hours, As needed, For Temp greater than 38 C (100.4 F)  -- Indication: For As needed    oxyCODONE 5 mg oral tablet  -- 1 tab(s) by mouth every 6 hours, As needed, Moderate Pain (4 - 6)  -- Indication: For As needed for pain    acetaminophen 325 mg oral tablet  -- 2 tab(s) by mouth every 6 hours, As needed, Mild Pain (1 - 3)  -- Indication: For As needed for pain    aspirin 81 mg oral delayed release tablet  -- 1 tab(s) by mouth once a day  -- Indication: For Heart    lisinopril 10 mg oral tablet  -- 1 tab(s) by mouth once a day  -- Indication: For High blood pressure    warfarin 3 mg oral tablet  -- 1 tab(s) by mouth once a day (hold tonight) INR 3.92 today.  Please check INR in AM.   -- Indication: For DVT (deep venous thrombosis)    Zofran 4 mg oral tablet  -- 1 tab(s) by mouth every 8 hours, As Needed  -- Indication: For As needed for nausea    simvastatin 20 mg oral tablet  -- 1 tab(s) by mouth once a day (at bedtime)  -- Indication: For High cholesterol    ALPRAZolam 0.25 mg oral tablet  -- 1 tab(s) by mouth once a day, As needed, anxiety  -- Indication: For As needed for anxiety    budesonide-formoterol 160 mcg-4.5 mcg/inh inhalation aerosol  -- 2 puff(s) inhaled 2 times a day  -- Indication: For Lungs    cefTRIAXone  -- 2 gram(s) intravenous once a day x 5 more days  -- Indication: For Joint infection    Ceftin 500 mg oral tablet  -- 1 tab(s) by mouth every 12 hours (to be started once IV Rocephin is complete).   -- Indication: For Joint infection    collagenase 250 units/g topical ointment  -- 1 application on skin 2 times a day  -- Indication: For Topical    ferrous sulfate 324 mg (65 mg elemental iron) oral tablet  -- 1 tab(s) by mouth once a day  -- Indication: For Supplement    sodium chloride 0.65% nasal spray  -- 1 spray(s) into nose 3 times a day  -- Indication: For Nasal congestion    melatonin 1 mg oral tablet  -- 1 tab(s) by mouth once a day (at bedtime)  -- Indication: For Insomnia    melatonin 3 mg oral tablet  -- 1 tab(s) by mouth once a day (at bedtime), As needed, Insomnia  -- Indication: For As needed for insomnia    lactobacillus acidophilus oral capsule  -- 1 tab(s) by mouth once a day  -- Indication: For Probiotic    pantoprazole 40 mg oral delayed release tablet  -- 1 tab(s) by mouth once a day (before a meal)  -- Indication: For GERD    Multiple Vitamins oral tablet  -- 1 tab(s) by mouth once a day  -- Indication: For Supplement    Vitamin C 500 mg oral capsule  -- 1 cap(s) by mouth once a day  -- Indication: For Supplement

## 2018-01-29 NOTE — DISCHARGE NOTE ADULT - OTHER SIGNIFICANT FINDINGS
Attending DC note:  77-yo female w/PMHx of severe AS s/p TAVR, Stage II chronic diastolic CHF, PMR, asthma, GERD, s/p PPM, CAD, recent rt hip hardware infection s/p removal and placement of spacer on ceftriaxone through PICC, admitted from [facility] with hypoxia/fever and YONATAN.  V/Q scan (initial Cr) was low-prob.  While on ceftriaxone, she has not had any fevers, WBC has come down and her O2 sats are fine.  ID favors continuing ceftriaxone for the original 6 week period for strep sanguinis bacteremia, and stepping down to ceftin after ceftriaxone is finished regarding.  YONATAN improved s/p IVF.  received a slow unit of pRBC transfusion on 1/25 for anemia.  doppler positive for R LE above-the-knee DVT and pt started on therapeutic lovenox bridge to Coumadin. ortho is requesting vascular to put in a retrievable IVC filter because they want to do rt hip revision in 6 weeks that is not delayable. Patient to get this outpatient. discharged to [facility].

## 2018-01-29 NOTE — PROGRESS NOTE ADULT - PROBLEM SELECTOR PLAN 2
duoneb via HHN q 6h  symbicort 160 2 bid  spiriva  1 puff per day  incentive spirometry  pulmonary toilet-incentive spirometry, Chest Pt-acapella/chest vest-up to 5 times per day.    maintain oxygen levels above 90%-supplemental oxygen via nasal canula-humidified    All nebulized therapy is to be administered via hand held nebulizer-(patient must gargle and spit with water after use)

## 2018-01-29 NOTE — DISCHARGE NOTE ADULT - ADDITIONAL INSTRUCTIONS
Follow up with Dr Cassidy (orthopedics).  Follow up with Dr Cannon (ID).  Follow up with Dr Berg (vascular surgeon).

## 2018-01-29 NOTE — DISCHARGE NOTE ADULT - CARE PROVIDER_API CALL
Nate Bass), Orthopedics  [address] [place] [address]  Phone: [phone]  Fax: [phone]    Loki Berg), Vascular Surgery  [address] [facility]  [address] [place] [address]  Phone: [phone]  Fax: [phone]    Evangelista Cannon), Infectious Disease  [address] [facility] [address]  [place] [address]  Phone: [phone]  Fax: [phone]

## 2018-01-29 NOTE — PROGRESS NOTE ADULT - PROBLEM SELECTOR PLAN 10
YONATAN on CKD  improved s/p IVF  anemia like 2/2 CKD, guaic neg  can resume ACEI on DC    dispo: DC to rehab

## 2018-02-03 LAB
ACID FAST STN SPEC: SIGNIFICANT CHANGE UP

## 2018-02-06 ENCOUNTER — CHART COPY (OUTPATIENT)
Age: 78
End: 2018-02-06

## 2018-02-07 ENCOUNTER — APPOINTMENT (OUTPATIENT)
Dept: ORTHOPEDIC SURGERY | Facility: CLINIC | Age: 78
End: 2018-02-07
Payer: MEDICARE

## 2018-02-07 VITALS
DIASTOLIC BLOOD PRESSURE: 74 MMHG | HEART RATE: 76 BPM | HEIGHT: 61 IN | BODY MASS INDEX: 50.03 KG/M2 | WEIGHT: 265 LBS | SYSTOLIC BLOOD PRESSURE: 133 MMHG

## 2018-02-07 DIAGNOSIS — Z47.89 ENCOUNTER FOR OTHER ORTHOPEDIC AFTERCARE: ICD-10-CM

## 2018-02-07 PROCEDURE — 99024 POSTOP FOLLOW-UP VISIT: CPT

## 2018-02-16 ENCOUNTER — APPOINTMENT (OUTPATIENT)
Dept: WOUND CARE | Facility: CLINIC | Age: 78
End: 2018-02-16
Payer: MEDICARE

## 2018-02-16 VITALS — TEMPERATURE: 97.8 F | DIASTOLIC BLOOD PRESSURE: 82 MMHG | SYSTOLIC BLOOD PRESSURE: 143 MMHG

## 2018-02-16 PROCEDURE — 11042 DBRDMT SUBQ TIS 1ST 20SQCM/<: CPT

## 2018-02-16 PROCEDURE — 99204 OFFICE O/P NEW MOD 45 MIN: CPT

## 2018-02-16 RX ORDER — WARFARIN 1 MG/1
1 TABLET ORAL
Qty: 30 | Refills: 0 | Status: ACTIVE | COMMUNITY
Start: 2018-02-12

## 2018-02-26 ENCOUNTER — APPOINTMENT (OUTPATIENT)
Dept: WOUND CARE | Facility: CLINIC | Age: 78
End: 2018-02-26
Payer: MEDICARE

## 2018-02-26 DIAGNOSIS — S81.809A UNSPECIFIED OPEN WOUND, UNSPECIFIED LOWER LEG, INITIAL ENCOUNTER: ICD-10-CM

## 2018-02-26 PROCEDURE — 99213 OFFICE O/P EST LOW 20 MIN: CPT

## 2018-02-28 ENCOUNTER — RX RENEWAL (OUTPATIENT)
Age: 78
End: 2018-02-28

## 2018-02-28 ENCOUNTER — APPOINTMENT (OUTPATIENT)
Dept: ORTHOPEDIC SURGERY | Facility: CLINIC | Age: 78
End: 2018-02-28
Payer: MEDICARE

## 2018-02-28 VITALS — BODY MASS INDEX: 50.03 KG/M2 | WEIGHT: 265 LBS | HEIGHT: 61 IN

## 2018-02-28 DIAGNOSIS — Z96.659 INFECTION AND INFLAMMATORY REACTION DUE TO OTHER INTERNAL JOINT PROSTHESIS, INITIAL ENCOUNTER: ICD-10-CM

## 2018-02-28 DIAGNOSIS — T84.59XA INFECTION AND INFLAMMATORY REACTION DUE TO OTHER INTERNAL JOINT PROSTHESIS, INITIAL ENCOUNTER: ICD-10-CM

## 2018-02-28 PROCEDURE — 99024 POSTOP FOLLOW-UP VISIT: CPT

## 2018-03-01 PROBLEM — S81.809A NON-HEALING WOUND OF LOWER EXTREMITY, INITIAL ENCOUNTER: Status: ACTIVE | Noted: 2018-02-28

## 2018-03-06 ENCOUNTER — LABORATORY RESULT (OUTPATIENT)
Age: 78
End: 2018-03-06

## 2018-03-06 ENCOUNTER — APPOINTMENT (OUTPATIENT)
Dept: PLASTIC SURGERY | Facility: CLINIC | Age: 78
End: 2018-03-06
Payer: MEDICARE

## 2018-03-06 VITALS
WEIGHT: 260 LBS | HEIGHT: 61 IN | SYSTOLIC BLOOD PRESSURE: 128 MMHG | OXYGEN SATURATION: 96 % | BODY MASS INDEX: 49.09 KG/M2 | DIASTOLIC BLOOD PRESSURE: 80 MMHG | TEMPERATURE: 97.9 F | HEART RATE: 74 BPM

## 2018-03-06 PROCEDURE — 99215 OFFICE O/P EST HI 40 MIN: CPT

## 2018-03-07 ENCOUNTER — APPOINTMENT (OUTPATIENT)
Dept: INTERVENTIONAL RADIOLOGY/VASCULAR | Facility: CLINIC | Age: 78
End: 2018-03-07
Payer: MEDICARE

## 2018-03-07 VITALS
RESPIRATION RATE: 18 BRPM | WEIGHT: 260 LBS | HEIGHT: 61 IN | BODY MASS INDEX: 49.09 KG/M2 | SYSTOLIC BLOOD PRESSURE: 150 MMHG | DIASTOLIC BLOOD PRESSURE: 91 MMHG | OXYGEN SATURATION: 99 % | HEART RATE: 70 BPM

## 2018-03-07 PROCEDURE — 99215 OFFICE O/P EST HI 40 MIN: CPT

## 2018-03-07 RX ORDER — PANTOPRAZOLE SODIUM 40 MG/1
40 TABLET, DELAYED RELEASE ORAL
Refills: 0 | Status: ACTIVE | COMMUNITY

## 2018-03-07 RX ORDER — METHYLPREDNISOLONE 4 MG/1
4 TABLET ORAL
Qty: 21 | Refills: 0 | Status: DISCONTINUED | COMMUNITY
Start: 2017-12-04 | End: 2018-03-07

## 2018-03-07 RX ORDER — SERTRALINE 25 MG/1
25 TABLET, FILM COATED ORAL DAILY
Qty: 90 | Refills: 1 | Status: DISCONTINUED | COMMUNITY
Start: 2017-07-27 | End: 2018-03-07

## 2018-03-07 RX ORDER — OXYCODONE 5 MG/1
5 TABLET ORAL
Refills: 0 | Status: ACTIVE | COMMUNITY

## 2018-03-07 RX ORDER — WARFARIN SODIUM 1 MG/1
1 TABLET ORAL
Refills: 0 | Status: ACTIVE | COMMUNITY

## 2018-03-07 RX ORDER — MULTIVIT-MIN/IRON/FOLIC ACID/K 18-600-40
CAPSULE ORAL
Refills: 0 | Status: ACTIVE | COMMUNITY

## 2018-03-07 RX ORDER — CLINDAMYCIN HYDROCHLORIDE 300 MG/1
300 CAPSULE ORAL
Qty: 4 | Refills: 0 | Status: DISCONTINUED | COMMUNITY
Start: 2017-11-08 | End: 2018-03-07

## 2018-03-07 RX ORDER — AMOXICILLIN 875 MG/1
875 TABLET, FILM COATED ORAL
Qty: 14 | Refills: 0 | Status: DISCONTINUED | COMMUNITY
Start: 2017-11-28 | End: 2018-03-07

## 2018-03-07 RX ORDER — SIMVASTATIN 20 MG/1
20 TABLET, FILM COATED ORAL
Refills: 0 | Status: ACTIVE | COMMUNITY

## 2018-03-07 RX ORDER — OXYCODONE 5 MG/1
5 TABLET ORAL
Qty: 30 | Refills: 0 | Status: DISCONTINUED | COMMUNITY
Start: 2018-02-12 | End: 2018-03-07

## 2018-03-07 RX ORDER — ESZOPICLONE 3 MG/1
TABLET, COATED ORAL
Refills: 0 | Status: ACTIVE | COMMUNITY

## 2018-03-07 RX ORDER — MELOXICAM 15 MG/1
15 TABLET ORAL DAILY
Qty: 90 | Refills: 2 | Status: DISCONTINUED | COMMUNITY
Start: 2018-02-28 | End: 2018-03-07

## 2018-03-07 RX ORDER — PREDNISONE 2.5 MG/1
2.5 TABLET ORAL
Refills: 0 | Status: ACTIVE | COMMUNITY

## 2018-03-07 RX ORDER — ALPRAZOLAM 0.25 MG/1
0.25 TABLET ORAL
Refills: 0 | Status: ACTIVE | COMMUNITY

## 2018-03-09 ENCOUNTER — OUTPATIENT (OUTPATIENT)
Dept: OUTPATIENT SERVICES | Facility: HOSPITAL | Age: 78
LOS: 1 days | End: 2018-03-09
Payer: MEDICARE

## 2018-03-09 VITALS
TEMPERATURE: 98 F | OXYGEN SATURATION: 95 % | RESPIRATION RATE: 18 BRPM | HEIGHT: 63 IN | WEIGHT: 259.93 LBS | DIASTOLIC BLOOD PRESSURE: 77 MMHG | SYSTOLIC BLOOD PRESSURE: 129 MMHG | HEART RATE: 72 BPM

## 2018-03-09 DIAGNOSIS — I82.409 ACUTE EMBOLISM AND THROMBOSIS OF UNSPECIFIED DEEP VEINS OF UNSPECIFIED LOWER EXTREMITY: ICD-10-CM

## 2018-03-09 DIAGNOSIS — Z95.2 PRESENCE OF PROSTHETIC HEART VALVE: Chronic | ICD-10-CM

## 2018-03-09 DIAGNOSIS — Z98.890 OTHER SPECIFIED POSTPROCEDURAL STATES: Chronic | ICD-10-CM

## 2018-03-09 DIAGNOSIS — Z96.659 PRESENCE OF UNSPECIFIED ARTIFICIAL KNEE JOINT: Chronic | ICD-10-CM

## 2018-03-09 DIAGNOSIS — Z95.0 PRESENCE OF CARDIAC PACEMAKER: Chronic | ICD-10-CM

## 2018-03-09 DIAGNOSIS — Z96.651 PRESENCE OF RIGHT ARTIFICIAL KNEE JOINT: ICD-10-CM

## 2018-03-09 DIAGNOSIS — T84.59XA INFECTION AND INFLAMMATORY REACTION DUE TO OTHER INTERNAL JOINT PROSTHESIS, INITIAL ENCOUNTER: ICD-10-CM

## 2018-03-09 DIAGNOSIS — Z95.0 PRESENCE OF CARDIAC PACEMAKER: ICD-10-CM

## 2018-03-09 DIAGNOSIS — Z01.818 ENCOUNTER FOR OTHER PREPROCEDURAL EXAMINATION: ICD-10-CM

## 2018-03-09 DIAGNOSIS — J45.909 UNSPECIFIED ASTHMA, UNCOMPLICATED: ICD-10-CM

## 2018-03-09 LAB
ALBUMIN SERPL ELPH-MCNC: 3.4 G/DL — SIGNIFICANT CHANGE UP (ref 3.3–5)
ALP SERPL-CCNC: 73 U/L — SIGNIFICANT CHANGE UP (ref 40–120)
ALT FLD-CCNC: 9 U/L RC — LOW (ref 10–45)
ANION GAP SERPL CALC-SCNC: 13 MMOL/L — SIGNIFICANT CHANGE UP (ref 5–17)
APTT BLD: 47.9 SEC — HIGH (ref 27.5–37.4)
AST SERPL-CCNC: 16 U/L — SIGNIFICANT CHANGE UP (ref 10–40)
BASOPHILS # BLD AUTO: 0.02 K/UL — SIGNIFICANT CHANGE UP (ref 0–0.2)
BASOPHILS NFR BLD AUTO: 0.3 % — SIGNIFICANT CHANGE UP (ref 0–2)
BILIRUB SERPL-MCNC: 0.3 MG/DL — SIGNIFICANT CHANGE UP (ref 0.2–1.2)
BLD GP AB SCN SERPL QL: POSITIVE — SIGNIFICANT CHANGE UP
BUN SERPL-MCNC: 30 MG/DL — HIGH (ref 7–23)
CALCIUM SERPL-MCNC: 9.7 MG/DL — SIGNIFICANT CHANGE UP (ref 8.4–10.5)
CHLORIDE SERPL-SCNC: 100 MMOL/L — SIGNIFICANT CHANGE UP (ref 96–108)
CO2 SERPL-SCNC: 25 MMOL/L — SIGNIFICANT CHANGE UP (ref 22–31)
CREAT SERPL-MCNC: 0.88 MG/DL — SIGNIFICANT CHANGE UP (ref 0.5–1.3)
EOSINOPHIL # BLD AUTO: 0.09 K/UL — SIGNIFICANT CHANGE UP (ref 0–0.5)
EOSINOPHIL NFR BLD AUTO: 1.2 % — SIGNIFICANT CHANGE UP (ref 0–6)
GLUCOSE SERPL-MCNC: 97 MG/DL — SIGNIFICANT CHANGE UP (ref 70–99)
HCT VFR BLD CALC: 33.8 % — LOW (ref 34.5–45)
HGB BLD-MCNC: 10.3 G/DL — LOW (ref 11.5–15.5)
IMM GRANULOCYTES NFR BLD AUTO: 0.3 % — SIGNIFICANT CHANGE UP (ref 0–1.5)
INR BLD: 2.71 RATIO — HIGH (ref 0.88–1.16)
LYMPHOCYTES # BLD AUTO: 1.36 K/UL — SIGNIFICANT CHANGE UP (ref 1–3.3)
LYMPHOCYTES # BLD AUTO: 18.4 % — SIGNIFICANT CHANGE UP (ref 13–44)
MCHC RBC-ENTMCNC: 26.3 PG — LOW (ref 27–34)
MCHC RBC-ENTMCNC: 30.5 GM/DL — LOW (ref 32–36)
MCV RBC AUTO: 86.4 FL — SIGNIFICANT CHANGE UP (ref 80–100)
MONOCYTES # BLD AUTO: 0.48 K/UL — SIGNIFICANT CHANGE UP (ref 0–0.9)
MONOCYTES NFR BLD AUTO: 6.5 % — SIGNIFICANT CHANGE UP (ref 2–14)
MRSA PCR RESULT.: SIGNIFICANT CHANGE UP
NEUTROPHILS # BLD AUTO: 5.43 K/UL — SIGNIFICANT CHANGE UP (ref 1.8–7.4)
NEUTROPHILS NFR BLD AUTO: 73.3 % — SIGNIFICANT CHANGE UP (ref 43–77)
PLATELET # BLD AUTO: 255 K/UL — SIGNIFICANT CHANGE UP (ref 150–400)
POTASSIUM SERPL-MCNC: 4.4 MMOL/L — SIGNIFICANT CHANGE UP (ref 3.5–5.3)
POTASSIUM SERPL-SCNC: 4.4 MMOL/L — SIGNIFICANT CHANGE UP (ref 3.5–5.3)
PROT SERPL-MCNC: 8.3 G/DL — SIGNIFICANT CHANGE UP (ref 6–8.3)
PROTHROM AB SERPL-ACNC: 31.3 SEC — HIGH (ref 10–13.1)
RBC # BLD: 3.91 M/UL — SIGNIFICANT CHANGE UP (ref 3.8–5.2)
RBC # FLD: 16.7 % — HIGH (ref 10.3–14.5)
RH IG SCN BLD-IMP: POSITIVE — SIGNIFICANT CHANGE UP
S AUREUS DNA NOSE QL NAA+PROBE: SIGNIFICANT CHANGE UP
SODIUM SERPL-SCNC: 138 MMOL/L — SIGNIFICANT CHANGE UP (ref 135–145)
WBC # BLD: 7.4 K/UL — SIGNIFICANT CHANGE UP (ref 3.8–10.5)
WBC # FLD AUTO: 7.4 K/UL — SIGNIFICANT CHANGE UP (ref 3.8–10.5)

## 2018-03-09 PROCEDURE — G0463: CPT

## 2018-03-09 PROCEDURE — 85610 PROTHROMBIN TIME: CPT

## 2018-03-09 PROCEDURE — 86900 BLOOD TYPING SEROLOGIC ABO: CPT

## 2018-03-09 PROCEDURE — 86850 RBC ANTIBODY SCREEN: CPT

## 2018-03-09 PROCEDURE — 85027 COMPLETE CBC AUTOMATED: CPT

## 2018-03-09 PROCEDURE — 85730 THROMBOPLASTIN TIME PARTIAL: CPT

## 2018-03-09 PROCEDURE — 86901 BLOOD TYPING SEROLOGIC RH(D): CPT

## 2018-03-09 PROCEDURE — 87640 STAPH A DNA AMP PROBE: CPT

## 2018-03-09 PROCEDURE — 86870 RBC ANTIBODY IDENTIFICATION: CPT

## 2018-03-09 PROCEDURE — 80053 COMPREHEN METABOLIC PANEL: CPT

## 2018-03-09 RX ORDER — CEFOXITIN 1 G/1
1 INJECTION, POWDER, FOR SOLUTION INTRAVENOUS
Qty: 0 | Refills: 0 | COMMUNITY

## 2018-03-09 RX ORDER — ACETAMINOPHEN 500 MG
1000 TABLET ORAL ONCE
Qty: 0 | Refills: 0 | Status: COMPLETED | OUTPATIENT
Start: 2018-03-23 | End: 2018-03-23

## 2018-03-09 RX ORDER — CEFAZOLIN SODIUM 1 G
2000 VIAL (EA) INJECTION ONCE
Qty: 0 | Refills: 0 | Status: DISCONTINUED | OUTPATIENT
Start: 2018-03-23 | End: 2018-03-25

## 2018-03-09 RX ORDER — WARFARIN SODIUM 2.5 MG/1
1 TABLET ORAL
Qty: 0 | Refills: 0 | COMMUNITY

## 2018-03-09 RX ORDER — ONDANSETRON 8 MG/1
1 TABLET, FILM COATED ORAL
Qty: 0 | Refills: 0 | COMMUNITY

## 2018-03-09 NOTE — H&P PST ADULT - NSANTHOSAYNRD_GEN_A_CORE
No. MYAH screening performed.  STOP BANG Legend: 0-2 = LOW Risk; 3-4 = INTERMEDIATE Risk; 5-8 = HIGH Risk

## 2018-03-09 NOTE — H&P PST ADULT - PROBLEM SELECTOR PLAN 4
Pt currently on Coumadin, scheduled for IVC filter on 3/20/18 as per pt. Pt to see Cardiologist on 3/15/18 to transition from Coumadin to Lovenox.

## 2018-03-09 NOTE — H&P PST ADULT - FALL HARM RISK
bones(Osteoporosis,prev fx,steroid use,metastatic bone ca/surgery/coagulation(Bleeding disorder R/T clinical cond/anti-coags)

## 2018-03-09 NOTE — H&P PST ADULT - HISTORY OF PRESENT ILLNESS
77 f with extensive PMH including LAD, GERD, Anxiety, Anemia, CAD s/p HERBERT, severe AS (s/p TAVR 12/22/17 & PPM 12/25/17 [employer] Model [id number] last interrogated 11/30/17, appointment on 3/22/18 to check PPM as per Cardiology office), h/o MVR, PMR on chronic steroids, asthma, OA, s/p TKR with recent joint infection s/p explant and abx spacer on 12/17/17. Pt presented in wheelchair w/ aid. Denies chest pain or SOB. 77 f presents to PST for a Right Total Knee Replacement on 3/23/18. Pt with extensive PMH including HLD, GERD, Anxiety, Anemia, CAD s/p HERBERT, severe AS (s/p TAVR & PPM 12/17 [employer] Model [id number] last interrogated 11/30/17, appointment on 3/22/18 to check PPM as per Cardiology office), h/o MVR, PMR on chronic steroids, asthma, OA, s/p TKR with recent joint infection s/p explant and abx spacer on 12/23/17, pt sent to rehab and was receiving DVT prophylaxis. Pt developed nosebleed post operatively and prophylaxis was D/C'd, pt then developed RLE DVT and is now on Coumadin. Pt to see cardiologist on 3/15/18 to transition from Coumadin to Lovenox prior to scheduled surgery. Pt scheduled for IVC filter on 3/20/18. Pt presented to PST in wheelchair w/ aid. Denies chest pain or SOB. 77 f presents to PST for a Right Total Knee spacer exchange on 3/23/18. Pt with extensive PMH including HLD, GERD, Anxiety, Anemia, CAD s/p HERBERT, severe AS (s/p TAVR & PPM 12/17 [employer] Model [id number] last interrogated 11/30/17, appointment on 3/22/18 to check PPM as per Cardiology office), h/o MVR, PMR on chronic steroids, asthma, OA, s/p TKR with recent joint infection s/p explant and abx spacer on 12/23/17, pt sent to rehab and was receiving DVT prophylaxis. Pt developed nosebleed post operatively and prophylaxis was D/C'd, pt then developed RLE DVT and is now on Coumadin. Pt to see cardiologist on 3/15/18 to transition from Coumadin to Lovenox prior to scheduled surgery. Pt scheduled for IVC filter on 3/20/18. Pt presented to PST in wheelchair w/ aid. Denies chest pain or SOB.

## 2018-03-09 NOTE — H&P PST ADULT - PROBLEM SELECTOR PLAN 3
Pt scheduled for sx on 3/23/18. Chlorhexidine & surgical instructions provided to pt and aid at bedside. Pt instructed about necessity to attend Joint Replacement class. Date given to pt.

## 2018-03-09 NOTE — H&P PST ADULT - PSH
Artificial cardiac pacemaker  12/25/17  H/O cardiac catheterization  11/29/16 HERBERT placed on LAD  H/O endoscopy  with dilatation of esophageal stricture 2013  S/P cataract surgery  x2; 3-4yr ago  S/P cholecystectomy  more than 15 years ago  S/P gastroplasty  28 yrs ago  S/P hysterectomy  24yr ago  S/P TAVR (transcatheter aortic valve replacement)  12/22/17  S/P TKR (total knee replacement)  joint infection s/p explant and abx spacer on 12/17/17  S/P total knee replacement  left, 15yr ago  S/P total knee replacement  right, 12yr ago

## 2018-03-09 NOTE — H&P PST ADULT - ATTENDING COMMENTS
right knee antibiotic spacer explantion, irrigation and debridement, static antibiotic spacer placement and plastic surgery closure with or without a rotational muscle flap and possible split thickness skin graft

## 2018-03-10 ENCOUNTER — OUTPATIENT (OUTPATIENT)
Dept: OUTPATIENT SERVICES | Facility: HOSPITAL | Age: 78
LOS: 1 days | End: 2018-03-10
Payer: MEDICARE

## 2018-03-10 DIAGNOSIS — Z95.2 PRESENCE OF PROSTHETIC HEART VALVE: Chronic | ICD-10-CM

## 2018-03-10 DIAGNOSIS — Z96.659 PRESENCE OF UNSPECIFIED ARTIFICIAL KNEE JOINT: Chronic | ICD-10-CM

## 2018-03-10 DIAGNOSIS — Z98.890 OTHER SPECIFIED POSTPROCEDURAL STATES: Chronic | ICD-10-CM

## 2018-03-10 DIAGNOSIS — Z95.0 PRESENCE OF CARDIAC PACEMAKER: Chronic | ICD-10-CM

## 2018-03-10 LAB
ANTIBODY ID 1_1: SIGNIFICANT CHANGE UP
ANTIBODY ID 1_2: SIGNIFICANT CHANGE UP

## 2018-03-10 PROCEDURE — 86077 PHYS BLOOD BANK SERV XMATCH: CPT

## 2018-03-10 PROCEDURE — 86880 COOMBS TEST DIRECT: CPT

## 2018-03-10 PROCEDURE — 86860 RBC ANTIBODY ELUTION: CPT

## 2018-03-12 ENCOUNTER — APPOINTMENT (OUTPATIENT)
Dept: WOUND CARE | Facility: CLINIC | Age: 78
End: 2018-03-12
Payer: MEDICARE

## 2018-03-12 ENCOUNTER — CHART COPY (OUTPATIENT)
Age: 78
End: 2018-03-12

## 2018-03-12 VITALS
TEMPERATURE: 97.7 F | BODY MASS INDEX: 49.09 KG/M2 | SYSTOLIC BLOOD PRESSURE: 137 MMHG | HEIGHT: 61 IN | DIASTOLIC BLOOD PRESSURE: 78 MMHG | HEART RATE: 80 BPM | WEIGHT: 260 LBS

## 2018-03-12 DIAGNOSIS — S81.801A UNSPECIFIED OPEN WOUND, RIGHT LOWER LEG, INITIAL ENCOUNTER: ICD-10-CM

## 2018-03-12 PROCEDURE — 99213 OFFICE O/P EST LOW 20 MIN: CPT

## 2018-03-15 ENCOUNTER — APPOINTMENT (OUTPATIENT)
Dept: CARDIOLOGY | Facility: CLINIC | Age: 78
End: 2018-03-15
Payer: MEDICARE

## 2018-03-15 ENCOUNTER — NON-APPOINTMENT (OUTPATIENT)
Age: 78
End: 2018-03-15

## 2018-03-15 ENCOUNTER — APPOINTMENT (OUTPATIENT)
Dept: ELECTROPHYSIOLOGY | Facility: CLINIC | Age: 78
End: 2018-03-15
Payer: MEDICARE

## 2018-03-15 VITALS — HEIGHT: 55 IN | BODY MASS INDEX: 59.02 KG/M2 | OXYGEN SATURATION: 97 % | WEIGHT: 255 LBS

## 2018-03-15 VITALS — RESPIRATION RATE: 18 BRPM

## 2018-03-15 DIAGNOSIS — I50.30 UNSPECIFIED DIASTOLIC (CONGESTIVE) HEART FAILURE: ICD-10-CM

## 2018-03-15 DIAGNOSIS — I82.409 ACUTE EMBOLISM AND THROMBOSIS OF UNSPECIFIED DEEP VEINS OF UNSPECIFIED LOWER EXTREMITY: ICD-10-CM

## 2018-03-15 DIAGNOSIS — I25.10 ATHEROSCLEROTIC HEART DISEASE OF NATIVE CORONARY ARTERY W/OUT ANGINA PECTORIS: ICD-10-CM

## 2018-03-15 DIAGNOSIS — R06.02 SHORTNESS OF BREATH: ICD-10-CM

## 2018-03-15 DIAGNOSIS — I10 ESSENTIAL (PRIMARY) HYPERTENSION: ICD-10-CM

## 2018-03-15 DIAGNOSIS — E78.5 HYPERLIPIDEMIA, UNSPECIFIED: ICD-10-CM

## 2018-03-15 DIAGNOSIS — I35.0 NONRHEUMATIC AORTIC (VALVE) STENOSIS: ICD-10-CM

## 2018-03-15 PROCEDURE — 93000 ELECTROCARDIOGRAM COMPLETE: CPT

## 2018-03-15 PROCEDURE — 99215 OFFICE O/P EST HI 40 MIN: CPT

## 2018-03-15 PROCEDURE — 93280 PM DEVICE PROGR EVAL DUAL: CPT

## 2018-03-15 RX ORDER — GABAPENTIN 100 MG/1
100 CAPSULE ORAL TWICE DAILY
Qty: 180 | Refills: 3 | Status: ACTIVE | COMMUNITY
Start: 2018-03-15 | End: 1900-01-01

## 2018-03-15 RX ORDER — ENOXAPARIN SODIUM 100 MG/ML
100 INJECTION SUBCUTANEOUS
Qty: 14 | Refills: 0 | Status: ACTIVE | COMMUNITY
Start: 2018-03-15 | End: 1900-01-01

## 2018-03-20 ENCOUNTER — OUTPATIENT (OUTPATIENT)
Dept: OUTPATIENT SERVICES | Facility: HOSPITAL | Age: 78
LOS: 1 days | End: 2018-03-20
Payer: MEDICARE

## 2018-03-20 ENCOUNTER — CHART COPY (OUTPATIENT)
Age: 78
End: 2018-03-20

## 2018-03-20 DIAGNOSIS — Z95.2 PRESENCE OF PROSTHETIC HEART VALVE: Chronic | ICD-10-CM

## 2018-03-20 DIAGNOSIS — Z96.659 PRESENCE OF UNSPECIFIED ARTIFICIAL KNEE JOINT: Chronic | ICD-10-CM

## 2018-03-20 DIAGNOSIS — I82.409 ACUTE EMBOLISM AND THROMBOSIS OF UNSPECIFIED DEEP VEINS OF UNSPECIFIED LOWER EXTREMITY: ICD-10-CM

## 2018-03-20 DIAGNOSIS — Z95.0 PRESENCE OF CARDIAC PACEMAKER: Chronic | ICD-10-CM

## 2018-03-20 DIAGNOSIS — Z98.890 OTHER SPECIFIED POSTPROCEDURAL STATES: Chronic | ICD-10-CM

## 2018-03-20 PROCEDURE — 37191 INS ENDOVAS VENA CAVA FILTR: CPT

## 2018-03-21 ENCOUNTER — CHART COPY (OUTPATIENT)
Age: 78
End: 2018-03-21

## 2018-03-22 ENCOUNTER — TRANSCRIPTION ENCOUNTER (OUTPATIENT)
Age: 78
End: 2018-03-22

## 2018-03-22 DIAGNOSIS — T84.59XA INFECTION AND INFLAMMATORY REACTION DUE TO OTHER INTERNAL JOINT PROSTHESIS, INITIAL ENCOUNTER: ICD-10-CM

## 2018-03-22 DIAGNOSIS — Z01.818 ENCOUNTER FOR OTHER PREPROCEDURAL EXAMINATION: ICD-10-CM

## 2018-03-23 ENCOUNTER — INPATIENT (INPATIENT)
Facility: HOSPITAL | Age: 78
LOS: 11 days | Discharge: HOME | DRG: 493 | End: 2018-04-04
Attending: INTERNAL MEDICINE | Admitting: ORTHOPAEDIC SURGERY
Payer: MEDICARE

## 2018-03-23 ENCOUNTER — TRANSCRIPTION ENCOUNTER (OUTPATIENT)
Age: 78
End: 2018-03-23

## 2018-03-23 ENCOUNTER — APPOINTMENT (OUTPATIENT)
Dept: ORTHOPEDIC SURGERY | Facility: HOSPITAL | Age: 78
End: 2018-03-23

## 2018-03-23 VITALS
RESPIRATION RATE: 20 BRPM | HEART RATE: 80 BPM | DIASTOLIC BLOOD PRESSURE: 80 MMHG | TEMPERATURE: 98 F | OXYGEN SATURATION: 96 % | SYSTOLIC BLOOD PRESSURE: 144 MMHG

## 2018-03-23 DIAGNOSIS — Z95.0 PRESENCE OF CARDIAC PACEMAKER: Chronic | ICD-10-CM

## 2018-03-23 DIAGNOSIS — Z96.659 PRESENCE OF UNSPECIFIED ARTIFICIAL KNEE JOINT: Chronic | ICD-10-CM

## 2018-03-23 DIAGNOSIS — Z86.718 PERSONAL HISTORY OF OTHER VENOUS THROMBOSIS AND EMBOLISM: ICD-10-CM

## 2018-03-23 DIAGNOSIS — Z95.2 PRESENCE OF PROSTHETIC HEART VALVE: Chronic | ICD-10-CM

## 2018-03-23 DIAGNOSIS — Z98.890 OTHER SPECIFIED POSTPROCEDURAL STATES: Chronic | ICD-10-CM

## 2018-03-23 DIAGNOSIS — T84.59XA INFECTION AND INFLAMMATORY REACTION DUE TO OTHER INTERNAL JOINT PROSTHESIS, INITIAL ENCOUNTER: ICD-10-CM

## 2018-03-23 LAB
ANION GAP SERPL CALC-SCNC: 13 MMOL/L — SIGNIFICANT CHANGE UP (ref 5–17)
APTT BLD: 34.8 SEC — SIGNIFICANT CHANGE UP (ref 27.5–37.4)
BLD GP AB SCN SERPL QL: POSITIVE — SIGNIFICANT CHANGE UP
BUN SERPL-MCNC: 15 MG/DL — SIGNIFICANT CHANGE UP (ref 7–23)
CALCIUM SERPL-MCNC: 9.4 MG/DL — SIGNIFICANT CHANGE UP (ref 8.4–10.5)
CHLORIDE SERPL-SCNC: 105 MMOL/L — SIGNIFICANT CHANGE UP (ref 96–108)
CO2 SERPL-SCNC: 20 MMOL/L — LOW (ref 22–31)
CREAT SERPL-MCNC: 0.84 MG/DL — SIGNIFICANT CHANGE UP (ref 0.5–1.3)
GLUCOSE SERPL-MCNC: 171 MG/DL — HIGH (ref 70–99)
HCT VFR BLD CALC: 31.4 % — LOW (ref 34.5–45)
HGB BLD-MCNC: 10.5 G/DL — LOW (ref 11.5–15.5)
INR BLD: 1.31 RATIO — HIGH (ref 0.88–1.16)
INR BLD: 1.37 RATIO — HIGH (ref 0.88–1.16)
MCHC RBC-ENTMCNC: 28.6 PG — SIGNIFICANT CHANGE UP (ref 27–34)
MCHC RBC-ENTMCNC: 33.4 GM/DL — SIGNIFICANT CHANGE UP (ref 32–36)
MCV RBC AUTO: 85.7 FL — SIGNIFICANT CHANGE UP (ref 80–100)
PLATELET # BLD AUTO: 201 K/UL — SIGNIFICANT CHANGE UP (ref 150–400)
POTASSIUM SERPL-MCNC: 5 MMOL/L — SIGNIFICANT CHANGE UP (ref 3.5–5.3)
POTASSIUM SERPL-SCNC: 5 MMOL/L — SIGNIFICANT CHANGE UP (ref 3.5–5.3)
PROTHROM AB SERPL-ACNC: 14.2 SEC — HIGH (ref 9.8–12.7)
PROTHROM AB SERPL-ACNC: 14.9 SEC — HIGH (ref 9.8–12.7)
RBC # BLD: 3.66 M/UL — LOW (ref 3.8–5.2)
RBC # FLD: 15 % — HIGH (ref 10.3–14.5)
RH IG SCN BLD-IMP: POSITIVE — SIGNIFICANT CHANGE UP
SODIUM SERPL-SCNC: 138 MMOL/L — SIGNIFICANT CHANGE UP (ref 135–145)
WBC # BLD: 11.7 K/UL — HIGH (ref 3.8–10.5)
WBC # FLD AUTO: 11.7 K/UL — HIGH (ref 3.8–10.5)

## 2018-03-23 PROCEDURE — 73560 X-RAY EXAM OF KNEE 1 OR 2: CPT | Mod: 26,RT

## 2018-03-23 PROCEDURE — 27488 REMOVAL OF KNEE PROSTHESIS: CPT | Mod: 58,RT

## 2018-03-23 RX ORDER — ACETAMINOPHEN 500 MG
650 TABLET ORAL EVERY 6 HOURS
Qty: 0 | Refills: 0 | Status: DISCONTINUED | OUTPATIENT
Start: 2018-03-23 | End: 2018-04-04

## 2018-03-23 RX ORDER — POLYETHYLENE GLYCOL 3350 17 G/17G
17 POWDER, FOR SOLUTION ORAL DAILY
Qty: 0 | Refills: 0 | Status: DISCONTINUED | OUTPATIENT
Start: 2018-03-23 | End: 2018-04-04

## 2018-03-23 RX ORDER — OXYCODONE HYDROCHLORIDE 5 MG/1
10 TABLET ORAL EVERY 4 HOURS
Qty: 0 | Refills: 0 | Status: DISCONTINUED | OUTPATIENT
Start: 2018-03-23 | End: 2018-03-28

## 2018-03-23 RX ORDER — KETOROLAC TROMETHAMINE 30 MG/ML
15 SYRINGE (ML) INJECTION ONCE
Qty: 0 | Refills: 0 | Status: DISCONTINUED | OUTPATIENT
Start: 2018-03-23 | End: 2018-03-25

## 2018-03-23 RX ORDER — HYDROMORPHONE HYDROCHLORIDE 2 MG/ML
0.5 INJECTION INTRAMUSCULAR; INTRAVENOUS; SUBCUTANEOUS EVERY 4 HOURS
Qty: 0 | Refills: 0 | Status: DISCONTINUED | OUTPATIENT
Start: 2018-03-23 | End: 2018-03-24

## 2018-03-23 RX ORDER — SODIUM CHLORIDE 9 MG/ML
1000 INJECTION INTRAMUSCULAR; INTRAVENOUS; SUBCUTANEOUS
Qty: 0 | Refills: 0 | Status: DISCONTINUED | OUTPATIENT
Start: 2018-03-23 | End: 2018-03-24

## 2018-03-23 RX ORDER — SODIUM CHLORIDE 9 MG/ML
3 INJECTION INTRAMUSCULAR; INTRAVENOUS; SUBCUTANEOUS EVERY 8 HOURS
Qty: 0 | Refills: 0 | Status: DISCONTINUED | OUTPATIENT
Start: 2018-03-23 | End: 2018-03-23

## 2018-03-23 RX ORDER — ONDANSETRON 8 MG/1
4 TABLET, FILM COATED ORAL EVERY 6 HOURS
Qty: 0 | Refills: 0 | Status: DISCONTINUED | OUTPATIENT
Start: 2018-03-23 | End: 2018-04-04

## 2018-03-23 RX ORDER — HYDROMORPHONE HYDROCHLORIDE 2 MG/ML
0.5 INJECTION INTRAMUSCULAR; INTRAVENOUS; SUBCUTANEOUS
Qty: 0 | Refills: 0 | Status: DISCONTINUED | OUTPATIENT
Start: 2018-03-23 | End: 2018-03-24

## 2018-03-23 RX ORDER — ENOXAPARIN SODIUM 100 MG/ML
100 INJECTION SUBCUTANEOUS EVERY 12 HOURS
Qty: 0 | Refills: 0 | Status: DISCONTINUED | OUTPATIENT
Start: 2018-03-23 | End: 2018-03-25

## 2018-03-23 RX ORDER — ONDANSETRON 8 MG/1
4 TABLET, FILM COATED ORAL ONCE
Qty: 0 | Refills: 0 | Status: DISCONTINUED | OUTPATIENT
Start: 2018-03-23 | End: 2018-03-24

## 2018-03-23 RX ORDER — DIPHENHYDRAMINE HCL 50 MG
25 CAPSULE ORAL EVERY 4 HOURS
Qty: 0 | Refills: 0 | Status: DISCONTINUED | OUTPATIENT
Start: 2018-03-23 | End: 2018-03-25

## 2018-03-23 RX ORDER — ZOLPIDEM TARTRATE 10 MG/1
5 TABLET ORAL AT BEDTIME
Qty: 0 | Refills: 0 | Status: DISCONTINUED | OUTPATIENT
Start: 2018-03-23 | End: 2018-03-23

## 2018-03-23 RX ORDER — ACETAMINOPHEN 500 MG
975 TABLET ORAL EVERY 8 HOURS
Qty: 0 | Refills: 0 | Status: DISCONTINUED | OUTPATIENT
Start: 2018-03-23 | End: 2018-04-04

## 2018-03-23 RX ORDER — BENZOCAINE AND MENTHOL 5; 1 G/100ML; G/100ML
1 LIQUID ORAL EVERY 4 HOURS
Qty: 0 | Refills: 0 | Status: DISCONTINUED | OUTPATIENT
Start: 2018-03-23 | End: 2018-04-04

## 2018-03-23 RX ORDER — FOLIC ACID 0.8 MG
1 TABLET ORAL DAILY
Qty: 0 | Refills: 0 | Status: DISCONTINUED | OUTPATIENT
Start: 2018-03-23 | End: 2018-04-04

## 2018-03-23 RX ORDER — PIPERACILLIN AND TAZOBACTAM 4; .5 G/20ML; G/20ML
3.38 INJECTION, POWDER, LYOPHILIZED, FOR SOLUTION INTRAVENOUS EVERY 8 HOURS
Qty: 0 | Refills: 0 | Status: DISCONTINUED | OUTPATIENT
Start: 2018-03-23 | End: 2018-03-25

## 2018-03-23 RX ORDER — ASCORBIC ACID 60 MG
500 TABLET,CHEWABLE ORAL
Qty: 0 | Refills: 0 | Status: DISCONTINUED | OUTPATIENT
Start: 2018-03-23 | End: 2018-04-04

## 2018-03-23 RX ORDER — ALPRAZOLAM 0.25 MG
0.25 TABLET ORAL DAILY
Qty: 0 | Refills: 0 | Status: DISCONTINUED | OUTPATIENT
Start: 2018-03-23 | End: 2018-03-23

## 2018-03-23 RX ORDER — DOCUSATE SODIUM 100 MG
100 CAPSULE ORAL THREE TIMES A DAY
Qty: 0 | Refills: 0 | Status: DISCONTINUED | OUTPATIENT
Start: 2018-03-23 | End: 2018-04-04

## 2018-03-23 RX ORDER — PANTOPRAZOLE SODIUM 20 MG/1
40 TABLET, DELAYED RELEASE ORAL DAILY
Qty: 0 | Refills: 0 | Status: DISCONTINUED | OUTPATIENT
Start: 2018-03-23 | End: 2018-04-04

## 2018-03-23 RX ORDER — MAGNESIUM HYDROXIDE 400 MG/1
30 TABLET, CHEWABLE ORAL DAILY
Qty: 0 | Refills: 0 | Status: DISCONTINUED | OUTPATIENT
Start: 2018-03-23 | End: 2018-04-04

## 2018-03-23 RX ORDER — PANTOPRAZOLE SODIUM 20 MG/1
40 TABLET, DELAYED RELEASE ORAL ONCE
Qty: 0 | Refills: 0 | Status: DISCONTINUED | OUTPATIENT
Start: 2018-03-23 | End: 2018-03-23

## 2018-03-23 RX ORDER — DIPHENHYDRAMINE HCL 50 MG
25 CAPSULE ORAL AT BEDTIME
Qty: 0 | Refills: 0 | Status: DISCONTINUED | OUTPATIENT
Start: 2018-03-23 | End: 2018-03-25

## 2018-03-23 RX ORDER — TRAMADOL HYDROCHLORIDE 50 MG/1
50 TABLET ORAL EVERY 6 HOURS
Qty: 0 | Refills: 0 | Status: DISCONTINUED | OUTPATIENT
Start: 2018-03-23 | End: 2018-03-25

## 2018-03-23 RX ORDER — FERROUS SULFATE 325(65) MG
325 TABLET ORAL
Qty: 0 | Refills: 0 | Status: DISCONTINUED | OUTPATIENT
Start: 2018-03-23 | End: 2018-04-04

## 2018-03-23 RX ORDER — SODIUM CHLORIDE 9 MG/ML
3 INJECTION INTRAMUSCULAR; INTRAVENOUS; SUBCUTANEOUS EVERY 8 HOURS
Qty: 0 | Refills: 0 | Status: DISCONTINUED | OUTPATIENT
Start: 2018-03-23 | End: 2018-04-04

## 2018-03-23 RX ORDER — PIPERACILLIN AND TAZOBACTAM 4; .5 G/20ML; G/20ML
3.38 INJECTION, POWDER, LYOPHILIZED, FOR SOLUTION INTRAVENOUS ONCE
Qty: 0 | Refills: 0 | Status: DISCONTINUED | OUTPATIENT
Start: 2018-03-23 | End: 2018-03-23

## 2018-03-23 RX ORDER — GABAPENTIN 400 MG/1
100 CAPSULE ORAL ONCE
Qty: 0 | Refills: 0 | Status: DISCONTINUED | OUTPATIENT
Start: 2018-03-23 | End: 2018-03-23

## 2018-03-23 RX ORDER — TRAMADOL HYDROCHLORIDE 50 MG/1
50 TABLET ORAL ONCE
Qty: 0 | Refills: 0 | Status: COMPLETED | OUTPATIENT
Start: 2018-03-23 | End: 2018-03-23

## 2018-03-23 RX ORDER — OXYCODONE HYDROCHLORIDE 5 MG/1
5 TABLET ORAL EVERY 4 HOURS
Qty: 0 | Refills: 0 | Status: DISCONTINUED | OUTPATIENT
Start: 2018-03-23 | End: 2018-03-30

## 2018-03-23 RX ORDER — GABAPENTIN 400 MG/1
300 CAPSULE ORAL EVERY 12 HOURS
Qty: 0 | Refills: 0 | Status: DISCONTINUED | OUTPATIENT
Start: 2018-03-23 | End: 2018-03-24

## 2018-03-23 RX ORDER — BUDESONIDE AND FORMOTEROL FUMARATE DIHYDRATE 160; 4.5 UG/1; UG/1
2 AEROSOL RESPIRATORY (INHALATION)
Qty: 0 | Refills: 0 | Status: DISCONTINUED | OUTPATIENT
Start: 2018-03-23 | End: 2018-04-04

## 2018-03-23 RX ORDER — VANCOMYCIN HCL 1 G
1750 VIAL (EA) INTRAVENOUS DAILY
Qty: 0 | Refills: 0 | Status: DISCONTINUED | OUTPATIENT
Start: 2018-03-23 | End: 2018-03-25

## 2018-03-23 RX ORDER — SIMVASTATIN 20 MG/1
20 TABLET, FILM COATED ORAL AT BEDTIME
Qty: 0 | Refills: 0 | Status: DISCONTINUED | OUTPATIENT
Start: 2018-03-23 | End: 2018-04-04

## 2018-03-23 RX ORDER — PROCHLORPERAZINE MALEATE 5 MG
5 TABLET ORAL ONCE
Qty: 0 | Refills: 0 | Status: COMPLETED | OUTPATIENT
Start: 2018-03-23 | End: 2018-03-31

## 2018-03-23 RX ORDER — SENNA PLUS 8.6 MG/1
2 TABLET ORAL AT BEDTIME
Qty: 0 | Refills: 0 | Status: DISCONTINUED | OUTPATIENT
Start: 2018-03-23 | End: 2018-04-04

## 2018-03-23 RX ORDER — WARFARIN SODIUM 2.5 MG/1
5 TABLET ORAL ONCE
Qty: 0 | Refills: 0 | Status: COMPLETED | OUTPATIENT
Start: 2018-03-23 | End: 2018-03-23

## 2018-03-23 RX ORDER — LIDOCAINE HCL 20 MG/ML
0.2 VIAL (ML) INJECTION ONCE
Qty: 0 | Refills: 0 | Status: DISCONTINUED | OUTPATIENT
Start: 2018-03-23 | End: 2018-03-23

## 2018-03-23 RX ADMIN — HYDROMORPHONE HYDROCHLORIDE 0.5 MILLIGRAM(S): 2 INJECTION INTRAMUSCULAR; INTRAVENOUS; SUBCUTANEOUS at 23:45

## 2018-03-23 RX ADMIN — SODIUM CHLORIDE 75 MILLILITER(S): 9 INJECTION INTRAMUSCULAR; INTRAVENOUS; SUBCUTANEOUS at 19:05

## 2018-03-23 RX ADMIN — SIMVASTATIN 20 MILLIGRAM(S): 20 TABLET, FILM COATED ORAL at 22:41

## 2018-03-23 RX ADMIN — HYDROMORPHONE HYDROCHLORIDE 0.5 MILLIGRAM(S): 2 INJECTION INTRAMUSCULAR; INTRAVENOUS; SUBCUTANEOUS at 19:45

## 2018-03-23 RX ADMIN — Medication 1 TABLET(S): at 22:41

## 2018-03-23 RX ADMIN — SODIUM CHLORIDE 3 MILLILITER(S): 9 INJECTION INTRAMUSCULAR; INTRAVENOUS; SUBCUTANEOUS at 22:15

## 2018-03-23 RX ADMIN — HYDROMORPHONE HYDROCHLORIDE 0.5 MILLIGRAM(S): 2 INJECTION INTRAMUSCULAR; INTRAVENOUS; SUBCUTANEOUS at 19:30

## 2018-03-23 RX ADMIN — HYDROMORPHONE HYDROCHLORIDE 0.5 MILLIGRAM(S): 2 INJECTION INTRAMUSCULAR; INTRAVENOUS; SUBCUTANEOUS at 23:30

## 2018-03-23 RX ADMIN — Medication 100 MILLIGRAM(S): at 22:41

## 2018-03-23 RX ADMIN — WARFARIN SODIUM 5 MILLIGRAM(S): 2.5 TABLET ORAL at 22:41

## 2018-03-23 NOTE — BRIEF OPERATIVE NOTE - PROCEDURE
Insertion of antibiotic spacer in right knee joint  03/23/2018    Active  ZEENAT  Open removal of spacer from right knee joint  03/23/2018    Active  ZEENAT  Irrigation and debridement after revision of total knee arthroplasty  03/23/2018    Active  ZEENAT <<-----Click on this checkbox to enter Procedure

## 2018-03-23 NOTE — BRIEF OPERATIVE NOTE - PROCEDURE
<<-----Click on this checkbox to enter Procedure Fusion of right knee  03/23/2018    Active  [id number]

## 2018-03-23 NOTE — BRIEF OPERATIVE NOTE - COMMENTS
See Ortho not for their portion
50% partial weightbearing  wound management per plastics  knee immobilizer at all times  NO KNEE ROM ALLOWED  pt/ot  anticipate transfer snf (Yas) in 3 days

## 2018-03-23 NOTE — BRIEF OPERATIVE NOTE - PRE-OP DX
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

## 2018-03-23 NOTE — PATIENT PROFILE ADULT. - HEALTHCARE QUESTIONS, PROFILE
ASSESSMENT/PLAN:  1. Diabetic ulcer of left heel associated with type 2 diabetes mellitus, with fat layer exposed (CMS/[facility])   There is some evidence of healing but they need to be more diligent about keeping pressure off the area.  If adding gel cushion is not yielding further healing would have followup with Dr Anglin and/or wound management.   2. Syncope, unspecified syncope type   History not strongly suggestive of seizures.     3. Closed head injury without loss of consciousness, initial encounter   Given increased falls, will request CT of the head to screen for bleeding/hematoma.   4. Obesity, Class III, BMI 40-49.9 (morbid obesity) (CMS/[facility])   Ongoing issue given his limited mobility although he does have some potential for water therapy.   He is aiming for bariatric surgery.   5. Diabetic polyneuropathy associated with type 2 diabetes mellitus (CMS/[facility])    6. Myalgia -   Related to statin use   7. Type 2 diabetes mellitus with diabetic neuropathic arthropathy, with long-term current use of insulin (CMS/[facility])    8. Seizures (CMS/[facility])   Controlled with background anticonvulsants.   9. PVD (peripheral vascular disease) (CMS/[facility])   Inaccurate HCC diagnosis, BARBARA suggested minimal right size disease.  Not symptomatic.       See Patient Instructions.   Orders Placed This Encounter   • CT Head Brain   • Glycohemoglobin   • Comprehensive Metabolic Panel   • Lipid Panel with Reflex   • Creatine Kinase   • SERVICE TO DIABETIC EDUCATION   • amitriptyline (ELAVIL) 25 MG tablet   • ibuprofen (MOTRIN) 800 MG tablet   • traMADol (ULTRAM) 50 MG tablet   • Insulin Syringe-Needle U-100 (ADVOCATE INSULIN SYRINGE) 31G X 5/16\" 0.5 ML Misc       The majority of a 30 minute office visit was spent in discussion and review as noted.  Return in about 3 months (around 6/19/2018).      ___________________________________________________  SUBJECTIVE:  Von is a 58 year old male who is seen accompanied by wife for follow up on multiple  problems.    Patient Active Problem List    Diagnosis Date Noted   • Charcot foot due to diabetes mellitus (CMS/[facility])       concerns.    New diagnosis 3/2015  S/p reconstruction with OREF     • Obesity, Class III, BMI 40-49.9 (morbid obesity) (CMS/[facility]) 03/30/2014     Increased weight from 2014 through 2016, shift from BMI 35-39 to > 40.  Weight loss continues to be a challenge.  He is contemplating bariatric surgery.  He has confirmed coverage on his Medicare.     Wt Readings from Last 4 Encounters:   03/19/18 (!) 149.7 kg   12/14/17 (!) 149.2 kg   12/12/17 (!) 149.7 kg   09/07/17 (!) 147.5 kg     TSH (mcUnits/mL)   Date Value   03/31/2014 4.421        • Other and unspecified hyperlipidemia 03/27/2014     Was on pravastatin - he stopped for thigh pain.   He notes some muscle weakness with prolonged standing - was better for a couple of days, but back where he started.   He is doing well with his current diet plus medication  regimen.  There are no side effects with current med.  Lab Results   Component Value Date    CHOLESTEROL 123 12/12/2017    HDL 35 (L) 12/12/2017    CALCLDL 44 12/12/2017    TRIGLYCERIDE 219 (H) 12/12/2017     Lab Results   Component Value Date    AST 22 03/19/2018         • Diabetic polyneuropathy (CMS/[facility]) 03/27/2014     On amitriptyline plus gabapentin.  Has had several falls, only one with question of loss of consciousness.  Hit his head on corner molding with scalp laceration about a month ago.   No LOC then but since then, he has had more dizziness, more trouble with balance, slower speech since then.       • Diabetic ulcer of right heel associated with type 2 diabetes mellitus, with fat layer exposed (CMS/[facility]) 04/17/2017     Previously on Avandia, metformin.  Was better with Lantus + Novolog - had gone to Novolin 70/30 BID for cost  He has questions about insulin pump use, although this might be a moot point if he pursues bariatric surgery.   • Vertigo 04/04/2016     Evaluated in ER late  2015.  Responsive to meclizine.   Has had some isolated spells in the more remote past.  Vertigo has been quiescent   • Sensorineural hearing loss, moderate, not using amplification 03/27/2014        • Seizures (CMS/[facility])      He report \"syncope\" ~2014 - tests were normal but he was continued on gabapentin for dual seizure and neuropathy coverage.   He reports passing out once as a child, not clearly with seizure activity.           REVIEW OF SYSTEMS::  In addition to that noted above, a review of systems is undertaken with remarkable reporting of:  Constitutional:   Generally feeling well.  Weight stable. Energy levels good.   Eyes:   Corrective lenses.    Musculoskeletal:   Has fallen on both knees several times in recent months.   Not necessarily associated with altered consciousness.  Integument:   He has left heel wound present since November 2017 - was seen by Dr Anglin about 2 weeks after onset.   No pain except lying in bed - he is always flat on his back.   He is not using any padding under the lower extremities despite spending much of the day with pressure on the heal posteriorly.     Chart Review:  I have reviewed the patient's MEDICATIONS and ALLERGIES, PAST MEDICAL, SURGICAL, SOCIAL AND FAMILY HISTORIES updating these as appropriate.    See Histories section of the EMR for a display of this information.       OBJECTIVE:  Visit Vitals  /78 (BP Location: [facility], Patient Position: Sitting, Cuff Size: Large Adult)   Pulse 78   Ht 6' 1\" (1.854 m)   Wt (!) 149.7 kg   SpO2 94%   BMI 43.54 kg/m²     General:  Well developed male.  Morbidly obese.  In no acute distress.    HEENT:  Normocephalic, atraumatic. Eyes- EOMI (Extraocular movements intact), PERRLA (pupils equal, round, reactive to light and accommodation), no conjunctival pallor or scleral icterus.  Ears- auditory acuity grossly diminished.  Chest: Symmetrical configuration, nontender.  Breath sounds clear throughout.  No accessory muscle  use.  Cardiovascular:  Regular rate and rhythm, no murmur or gallop.   Peripheral pulses intact and symmetrical in upper and lower extremities.  No carotid or abdominal bruits appreciated.  Abdomen: Soft, nontender, bowel sounds present.  No appreciable hepatomegaly or splenomegaly.  No masses. No costovertebral angle tenderness.    Extremities:   Right foot - Charcot deformity.  Open area on posterior heel as below.  1+ edema bilaterally.  Neurologic:  Alert, verbal, appropriate.  Oriented times 3.  Speech fluent.  Cranial nerves II-XII intact to observation.  No apraxia or ataxia observed.   Skin:  No rashes, he has some dry patchy areas on the legs & trunk   The left heel has a very shallow pressure ulceration posteriorly ~1.5cm diameter with evidence of prior perimeter healing.  No redness or drainage.           none

## 2018-03-23 NOTE — BRIEF OPERATIVE NOTE - ANTIBIOTIC PROTOCOL
held antibiotics for tissue cultures, zosyn and vancomycin administered approximately 30-45 minutes into case/Exception to protocol

## 2018-03-23 NOTE — BRIEF OPERATIVE NOTE - OPERATION/FINDINGS
s/p R knee fusion; complex wound closure in layers
synovitis, articulating antibiotic spacer removed, irrigation and debridement, placed static antibiotic spacer  5x cultures obtained by ortho and 1x by plastic surgery

## 2018-03-23 NOTE — CHART NOTE - NSCHARTNOTEFT_GEN_A_CORE
Patient resting without complaints.  Denies chest pain, SOB, N/V.    T(C): 36.4 (03-23-18 @ 20:00)  T(F): 97.5 (03-23-18 @ 20:00)  HR: 60 (03-23-18 @ 20:00)  BP: 140/62 (03-23-18 @ 20:00)  RR: 18 (03-23-18 @ 20:00)  SpO2: 100% (03-23-18 @ 20:00)      Exam:  Alert and Oriented, No Acute Distress    Cardio: RRR S1,S2    Pulm: CTA B/L    R Lower Extremity:  Knee Dsg CDI   PASCUAL and wound vac intact  KI on  +PF/DF  Sensation grossly intact                            10.5   11.7  )-----------( 201      ( 23 Mar 2018 19:36 )             31.4        138  |  105  |  15  ----------------------------<  171<H>  5.0   |  20<L>  |  0.84      Post-op R Knee XR done.     A/P: 77y Female S/P Irrigation and debridement after revision of R total knee arthroplasty & Insertion of antibiotic spacer with PRS closure/WV; Stable  -Pain Control  -DVT PPx; IS  -Am labs  -PT Eval-PWB 50% RLE  -Continue Current Tx.    Kaye Mackenzie PA-C  Orthopedic Surgery  Pagers [phone]

## 2018-03-24 LAB
ANION GAP SERPL CALC-SCNC: 14 MMOL/L — SIGNIFICANT CHANGE UP (ref 5–17)
APTT BLD: 33.1 SEC — SIGNIFICANT CHANGE UP (ref 27.5–37.4)
BUN SERPL-MCNC: 19 MG/DL — SIGNIFICANT CHANGE UP (ref 7–23)
CALCIUM SERPL-MCNC: 9 MG/DL — SIGNIFICANT CHANGE UP (ref 8.4–10.5)
CHLORIDE SERPL-SCNC: 105 MMOL/L — SIGNIFICANT CHANGE UP (ref 96–108)
CO2 SERPL-SCNC: 22 MMOL/L — SIGNIFICANT CHANGE UP (ref 22–31)
CREAT SERPL-MCNC: 1.32 MG/DL — HIGH (ref 0.5–1.3)
GLUCOSE SERPL-MCNC: 128 MG/DL — HIGH (ref 70–99)
GRAM STN FLD: SIGNIFICANT CHANGE UP
HCT VFR BLD CALC: 28.8 % — LOW (ref 34.5–45)
HGB BLD-MCNC: 9.4 G/DL — LOW (ref 11.5–15.5)
INR BLD: 2.03 RATIO — HIGH (ref 0.88–1.16)
MCHC RBC-ENTMCNC: 28.5 PG — SIGNIFICANT CHANGE UP (ref 27–34)
MCHC RBC-ENTMCNC: 32.7 GM/DL — SIGNIFICANT CHANGE UP (ref 32–36)
MCV RBC AUTO: 87.2 FL — SIGNIFICANT CHANGE UP (ref 80–100)
PLATELET # BLD AUTO: 162 K/UL — SIGNIFICANT CHANGE UP (ref 150–400)
POTASSIUM SERPL-MCNC: 5.1 MMOL/L — SIGNIFICANT CHANGE UP (ref 3.5–5.3)
POTASSIUM SERPL-SCNC: 5.1 MMOL/L — SIGNIFICANT CHANGE UP (ref 3.5–5.3)
PROTHROM AB SERPL-ACNC: 22.4 SEC — HIGH (ref 9.8–12.7)
RBC # BLD: 3.3 M/UL — LOW (ref 3.8–5.2)
RBC # FLD: 15.3 % — HIGH (ref 10.3–14.5)
SODIUM SERPL-SCNC: 141 MMOL/L — SIGNIFICANT CHANGE UP (ref 135–145)
SPECIMEN SOURCE: SIGNIFICANT CHANGE UP
WBC # BLD: 9.8 K/UL — SIGNIFICANT CHANGE UP (ref 3.8–10.5)
WBC # FLD AUTO: 9.8 K/UL — SIGNIFICANT CHANGE UP (ref 3.8–10.5)

## 2018-03-24 PROCEDURE — 99223 1ST HOSP IP/OBS HIGH 75: CPT

## 2018-03-24 RX ORDER — CELECOXIB 200 MG/1
100 CAPSULE ORAL
Qty: 0 | Refills: 0 | Status: DISCONTINUED | OUTPATIENT
Start: 2018-03-25 | End: 2018-03-25

## 2018-03-24 RX ORDER — WARFARIN SODIUM 2.5 MG/1
2.5 TABLET ORAL ONCE
Qty: 0 | Refills: 0 | Status: COMPLETED | OUTPATIENT
Start: 2018-03-24 | End: 2018-03-24

## 2018-03-24 RX ORDER — WARFARIN SODIUM 2.5 MG/1
5 TABLET ORAL ONCE
Qty: 0 | Refills: 0 | Status: DISCONTINUED | OUTPATIENT
Start: 2018-03-24 | End: 2018-03-24

## 2018-03-24 RX ORDER — KETOROLAC TROMETHAMINE 30 MG/ML
15 SYRINGE (ML) INJECTION EVERY 6 HOURS
Qty: 0 | Refills: 0 | Status: DISCONTINUED | OUTPATIENT
Start: 2018-03-24 | End: 2018-03-25

## 2018-03-24 RX ORDER — CYCLOBENZAPRINE HYDROCHLORIDE 10 MG/1
5 TABLET, FILM COATED ORAL
Qty: 0 | Refills: 0 | Status: DISCONTINUED | OUTPATIENT
Start: 2018-03-24 | End: 2018-04-04

## 2018-03-24 RX ORDER — SODIUM CHLORIDE 9 MG/ML
1000 INJECTION INTRAMUSCULAR; INTRAVENOUS; SUBCUTANEOUS
Qty: 0 | Refills: 0 | Status: DISCONTINUED | OUTPATIENT
Start: 2018-03-24 | End: 2018-03-25

## 2018-03-24 RX ADMIN — Medication 100 MILLIGRAM(S): at 21:41

## 2018-03-24 RX ADMIN — Medication 500 MILLIGRAM(S): at 05:09

## 2018-03-24 RX ADMIN — Medication 1 TABLET(S): at 13:24

## 2018-03-24 RX ADMIN — SODIUM CHLORIDE 160 MILLILITER(S): 9 INJECTION INTRAMUSCULAR; INTRAVENOUS; SUBCUTANEOUS at 23:17

## 2018-03-24 RX ADMIN — Medication 975 MILLIGRAM(S): at 23:16

## 2018-03-24 RX ADMIN — SODIUM CHLORIDE 160 MILLILITER(S): 9 INJECTION INTRAMUSCULAR; INTRAVENOUS; SUBCUTANEOUS at 20:54

## 2018-03-24 RX ADMIN — SODIUM CHLORIDE 160 MILLILITER(S): 9 INJECTION INTRAMUSCULAR; INTRAVENOUS; SUBCUTANEOUS at 10:07

## 2018-03-24 RX ADMIN — Medication 975 MILLIGRAM(S): at 10:15

## 2018-03-24 RX ADMIN — BUDESONIDE AND FORMOTEROL FUMARATE DIHYDRATE 2 PUFF(S): 160; 4.5 AEROSOL RESPIRATORY (INHALATION) at 18:26

## 2018-03-24 RX ADMIN — SODIUM CHLORIDE 3 MILLILITER(S): 9 INJECTION INTRAMUSCULAR; INTRAVENOUS; SUBCUTANEOUS at 21:44

## 2018-03-24 RX ADMIN — HYDROMORPHONE HYDROCHLORIDE 0.5 MILLIGRAM(S): 2 INJECTION INTRAMUSCULAR; INTRAVENOUS; SUBCUTANEOUS at 08:18

## 2018-03-24 RX ADMIN — Medication 975 MILLIGRAM(S): at 00:47

## 2018-03-24 RX ADMIN — Medication 100 MILLIGRAM(S): at 13:24

## 2018-03-24 RX ADMIN — PANTOPRAZOLE SODIUM 40 MILLIGRAM(S): 20 TABLET, DELAYED RELEASE ORAL at 11:12

## 2018-03-24 RX ADMIN — CYCLOBENZAPRINE HYDROCHLORIDE 5 MILLIGRAM(S): 10 TABLET, FILM COATED ORAL at 18:25

## 2018-03-24 RX ADMIN — HYDROMORPHONE HYDROCHLORIDE 0.5 MILLIGRAM(S): 2 INJECTION INTRAMUSCULAR; INTRAVENOUS; SUBCUTANEOUS at 08:30

## 2018-03-24 RX ADMIN — SODIUM CHLORIDE 3 MILLILITER(S): 9 INJECTION INTRAMUSCULAR; INTRAVENOUS; SUBCUTANEOUS at 05:09

## 2018-03-24 RX ADMIN — PIPERACILLIN AND TAZOBACTAM 25 GRAM(S): 4; .5 INJECTION, POWDER, LYOPHILIZED, FOR SOLUTION INTRAVENOUS at 16:14

## 2018-03-24 RX ADMIN — Medication 975 MILLIGRAM(S): at 16:15

## 2018-03-24 RX ADMIN — Medication 15 MILLIGRAM(S): at 18:40

## 2018-03-24 RX ADMIN — ENOXAPARIN SODIUM 100 MILLIGRAM(S): 100 INJECTION SUBCUTANEOUS at 18:26

## 2018-03-24 RX ADMIN — Medication 325 MILLIGRAM(S): at 09:14

## 2018-03-24 RX ADMIN — Medication 250 MILLIGRAM(S): at 11:12

## 2018-03-24 RX ADMIN — Medication 325 MILLIGRAM(S): at 12:16

## 2018-03-24 RX ADMIN — HYDROMORPHONE HYDROCHLORIDE 0.5 MILLIGRAM(S): 2 INJECTION INTRAMUSCULAR; INTRAVENOUS; SUBCUTANEOUS at 12:53

## 2018-03-24 RX ADMIN — Medication 975 MILLIGRAM(S): at 01:30

## 2018-03-24 RX ADMIN — HYDROMORPHONE HYDROCHLORIDE 0.5 MILLIGRAM(S): 2 INJECTION INTRAMUSCULAR; INTRAVENOUS; SUBCUTANEOUS at 13:05

## 2018-03-24 RX ADMIN — PIPERACILLIN AND TAZOBACTAM 25 GRAM(S): 4; .5 INJECTION, POWDER, LYOPHILIZED, FOR SOLUTION INTRAVENOUS at 00:15

## 2018-03-24 RX ADMIN — SODIUM CHLORIDE 3 MILLILITER(S): 9 INJECTION INTRAMUSCULAR; INTRAVENOUS; SUBCUTANEOUS at 14:54

## 2018-03-24 RX ADMIN — Medication 15 MILLIGRAM(S): at 18:26

## 2018-03-24 RX ADMIN — Medication 1 TABLET(S): at 05:09

## 2018-03-24 RX ADMIN — Medication 325 MILLIGRAM(S): at 18:25

## 2018-03-24 RX ADMIN — SIMVASTATIN 20 MILLIGRAM(S): 20 TABLET, FILM COATED ORAL at 21:41

## 2018-03-24 RX ADMIN — OXYCODONE HYDROCHLORIDE 10 MILLIGRAM(S): 5 TABLET ORAL at 03:30

## 2018-03-24 RX ADMIN — Medication 500 MILLIGRAM(S): at 18:25

## 2018-03-24 RX ADMIN — WARFARIN SODIUM 2.5 MILLIGRAM(S): 2.5 TABLET ORAL at 21:41

## 2018-03-24 RX ADMIN — Medication 1 MILLIGRAM(S): at 11:12

## 2018-03-24 RX ADMIN — OXYCODONE HYDROCHLORIDE 10 MILLIGRAM(S): 5 TABLET ORAL at 02:54

## 2018-03-24 RX ADMIN — BUDESONIDE AND FORMOTEROL FUMARATE DIHYDRATE 2 PUFF(S): 160; 4.5 AEROSOL RESPIRATORY (INHALATION) at 07:11

## 2018-03-24 RX ADMIN — Medication 1 TABLET(S): at 21:41

## 2018-03-24 RX ADMIN — PIPERACILLIN AND TAZOBACTAM 25 GRAM(S): 4; .5 INJECTION, POWDER, LYOPHILIZED, FOR SOLUTION INTRAVENOUS at 23:17

## 2018-03-24 RX ADMIN — Medication 1 TABLET(S): at 11:12

## 2018-03-24 RX ADMIN — PIPERACILLIN AND TAZOBACTAM 25 GRAM(S): 4; .5 INJECTION, POWDER, LYOPHILIZED, FOR SOLUTION INTRAVENOUS at 09:14

## 2018-03-24 RX ADMIN — ENOXAPARIN SODIUM 100 MILLIGRAM(S): 100 INJECTION SUBCUTANEOUS at 05:09

## 2018-03-24 RX ADMIN — Medication 15 MILLIGRAM(S): at 11:25

## 2018-03-24 RX ADMIN — Medication 975 MILLIGRAM(S): at 09:13

## 2018-03-24 RX ADMIN — Medication 15 MILLIGRAM(S): at 23:16

## 2018-03-24 RX ADMIN — Medication 15 MILLIGRAM(S): at 11:12

## 2018-03-24 RX ADMIN — Medication 975 MILLIGRAM(S): at 17:15

## 2018-03-24 RX ADMIN — Medication 100 MILLIGRAM(S): at 05:09

## 2018-03-24 NOTE — PHYSICAL THERAPY INITIAL EVALUATION ADULT - LEVEL OF INDEPENDENCE: SIT/STAND, REHAB EVAL
TBA pt became dizzy pale and began vomiting. 3/24: TBA pt became dizzy pale and began vomiting.  3/25: reached 3/4 standing with max A x2

## 2018-03-24 NOTE — OCCUPATIONAL THERAPY INITIAL EVALUATION ADULT - LEVEL OF INDEPENDENCE: SIT/STAND, REHAB EVAL
deferred 2/2 pt dizzy/ decreased bp attempted to perform however pt demonstrated increased anxiety therefore lift device used for transfer/maximum assist (25% patients effort)

## 2018-03-24 NOTE — PROVIDER CONTACT NOTE (OTHER) - BACKGROUND
Pt admitted for infection and inflammatory reaction due to other internal joint prosthesis. PMH- CAD, severe aortic stenosis, aortic stenosis, anxiety, osteoarthritis.

## 2018-03-24 NOTE — OCCUPATIONAL THERAPY INITIAL EVALUATION ADULT - BED MOBILITY TRAINING, PT EVAL
Pt will be independent in bed mobility within 2 weeks. Goal: Pt will be independent in bed mobility within 2 weeks.

## 2018-03-24 NOTE — PROGRESS NOTE ADULT - SUBJECTIVE AND OBJECTIVE BOX
Pt seen & examined. Pain controlled. No acute events overnight.  Patient states she had palpitations ON, but denies any CP/SOB/Fever/CHills.  Patient had minor episodes of Vtach stated by tele team. Patient asymptomatic     Vital Signs Last 24 Hrs  T(C): 36.7 (24 Mar 2018 04:26), Max: 37.6 (24 Mar 2018 01:58)  T(F): 98 (24 Mar 2018 04:26), Max: 99.7 (24 Mar 2018 01:58)  HR: 83 (24 Mar 2018 04:26) (60 - 104)  BP: 97/66 (24 Mar 2018 04:26) (84/56 - 145/67)  BP(mean): 79 (24 Mar 2018 01:00) (79 - 96)  RR: 18 (24 Mar 2018 04:26) (14 - 20)  SpO2: 98% (24 Mar 2018 04:26) (92% - 100%)    Gen: NAD  RLE:  Dressing clean D&I in KI  +sensation L2-S1  +dorsiflexion/plantarflexion of ankle/hallux  +dorsalis pedis pulse  Soft compartments, - calf tenderness

## 2018-03-24 NOTE — OCCUPATIONAL THERAPY INITIAL EVALUATION ADULT - ADDITIONAL COMMENTS
Pt developed nosebleed post operatively and prophylaxis was D/C'd, pt then developed RLE DVT and is now on Coumadin. Pt to see cardiologist on 3/15/18 to transition from Coumadin to Lovenox prior to scheduled surgery. Pt scheduled for IVC filter on 3/20/18. Pt presented to PST in wheelchair w/ aid. Denies chest pain or SOB. Insertion of antibiotic spacer in right knee joint  03/23/2018.Open removal of spacer from right knee joint  03/23/2018. Irrigation and debridement after revision of total knee arthroplasty  03/23/2018

## 2018-03-24 NOTE — OCCUPATIONAL THERAPY INITIAL EVALUATION ADULT - LIVES WITH, PROFILE
spouse/Pt lives with  in [facility], with home health aide 12 hours a day 7 days a week, with elevator access. Pt has been bed bound since February 2018, and last ambulated December 2017. Pt's home health aide does all ADLS and IADLs for pt in bed. Pt has a violet lift for transfers at home, and a wheelchair for mobility. Pt wears reading glasses and is right handed./other relative

## 2018-03-24 NOTE — PROGRESS NOTE ADULT - ATTENDING COMMENTS
I agree with the above note and have personally seen and examined this patient. All pertinent films have been reviewed. Please refer to clinical documentation of the history, physical examinations, data summary, and both assessment and plan as documented above and with which I agree.    doing well, working on pain control  pwb 50%, no knee motion  lovenox 100mg bid bridge to coumadin (lovenox dosing per cards)  anticipate dispo [facility] vs tues    Nate Bass MD  Attending Orthopedic Surgeon I agree with the above note and have personally seen and examined this patient. All pertinent films have been reviewed. Please refer to clinical documentation of the history, physical examinations, data summary, and both assessment and plan as documented above and with which I agree.    doing well, working on pain control  pwb 50%, no knee motion  lovenox 100mg bid bridge to coumadin (lovenox dosing per cards)  anticipate dispo snf mon vs tues  intraarticular knee cultures negative but sinus track deep culture positive for GPC, pending further speciation  ID consult pending, would prefer long term IV or PO abx    Nate Bass MD  Attending Orthopedic Surgeon

## 2018-03-24 NOTE — CONSULT NOTE ADULT - SUBJECTIVE AND OBJECTIVE BOX
HPI:   Patient is a 77y female with remote b/l TKR, TAVR and PPM 1 yr ago, CAD- stent, known to us from [facility] 12/'17, when she presented 1 month after root canal with S sanguinis bacteremia, infected R knee, leading to R knee I&D, VIOLA, placement of spacer 12/23/17.  TTE showed no new findings; bacteremia promptly cleared and pt relegated to 6 wks post op CTX.  The plan as outlined prior to d/c to Rehab was to relegate pt to extended po after CTX completed.  Pt reports she was given oral abx at Rehab, but not prescribed when she left there > 1 month ago.  Poor wound healing noted R knee, small open area, and pt admitted yest for wound revision, along with exchange of spacer, and fusion.  She denies recent fever or chills.  No redness surrounding R knee wound.    REVIEW OF SYSTEMS:  All other review of systems negative (Comprehensive ROS)    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  Artificial cardiac pacemaker: 12/25/17  H/O cardiac catheterization: 11/29/16 HERBERT placed on LAD  H/O endoscopy: with dilatation of esophageal stricture 2013  S/P cataract surgery: x2; 3-4yr ago  S/P gastroplasty: 28 yrs ago  S/P cholecystectomy: more than 15 years ago  S/P total knee replacement: left, 15yr ago  S/P total knee replacement: right, 12yr ago  S/P hysterectomy: 24yr ago      Allergies  amoxicillin (Other)    Intolerances  IV Contrast (Flushing (Skin); Nausea)    Antimicrobials POD # 2    ceFAZolin   IVPB 2000 milliGRAM(s) IV Intermittent once  piperacillin/tazobactam IVPB. 3.375 Gram(s) IV Intermittent every 8 hours  vancomycin  IVPB 1750 milliGRAM(s) IV Intermittent daily    Other Medications:  acetaminophen   Tablet 650 milliGRAM(s) Oral every 6 hours PRN  acetaminophen   Tablet. 975 milliGRAM(s) Oral every 8 hours  acetaminophen   Tablet. 1000 milliGRAM(s) Oral once  ALPRAZolam 0.25 milliGRAM(s) Oral daily PRN  aluminum hydroxide/magnesium hydroxide/simethicone Suspension 30 milliLiter(s) Oral four times a day PRN  ascorbic acid 500 milliGRAM(s) Oral two times a day  benzocaine 15 mG/menthol 3.6 mG Lozenge 1 Lozenge Oral every 4 hours PRN  buDESOnide 160 MICROgram(s)/formoterol 4.5 MICROgram(s) Inhaler 2 Puff(s) Inhalation two times a day  calcium carbonate 1250 mG + Vitamin D (OsCal 500 + D) 1 Tablet(s) Oral three times a day  cyclobenzaprine 5 milliGRAM(s) Oral two times a day  diphenhydrAMINE   Capsule 25 milliGRAM(s) Oral at bedtime PRN  diphenhydrAMINE   Capsule 25 milliGRAM(s) Oral every 4 hours PRN  docusate sodium 100 milliGRAM(s) Oral three times a day  enoxaparin Injectable 100 milliGRAM(s) SubCutaneous every 12 hours  ferrous    sulfate 325 milliGRAM(s) Oral three times a day with meals  folic acid 1 milliGRAM(s) Oral daily  HYDROmorphone  Injectable 0.5 milliGRAM(s) IV Push every 4 hours PRN  ketorolac   Injectable 15 milliGRAM(s) IV Push every 6 hours  ketorolac   Injectable 15 milliGRAM(s) IV Push once  magnesium hydroxide Suspension 30 milliLiter(s) Oral daily PRN  multivitamin 1 Tablet(s) Oral daily  ondansetron Injectable 4 milliGRAM(s) IV Push every 6 hours PRN  oxyCODONE    IR 5 milliGRAM(s) Oral every 4 hours PRN  oxyCODONE    IR 10 milliGRAM(s) Oral every 4 hours PRN  pantoprazole    Tablet 40 milliGRAM(s) Oral daily  polyethylene glycol 3350 17 Gram(s) Oral daily  prochlorperazine   Injectable 5 milliGRAM(s) IV Push once PRN  senna 2 Tablet(s) Oral at bedtime PRN  simvastatin 20 milliGRAM(s) Oral at bedtime  sodium chloride 0.9% lock flush 3 milliLiter(s) IV Push every 8 hours  sodium chloride 0.9%. 1000 milliLiter(s) IV Continuous <Continuous>  traMADol 50 milliGRAM(s) Oral every 6 hours PRN  warfarin 5 milliGRAM(s) Oral once  zolpidem 5 milliGRAM(s) Oral at bedtime PRN      FAMILY HISTORY:  No pertinent family history in first degree relatives      SOCIAL HISTORY:  Smoking: no   ETOH: no         T(F): 98.1 (03-24-18 @ 12:08), Max: 99.7 (03-24-18 @ 01:58)  HR: 87 (03-24-18 @ 12:08)  BP: 106/65 (03-24-18 @ 12:08)  RR: 18 (03-24-18 @ 12:08)  SpO2: 97% (03-24-18 @ 12:08)  Wt(kg): --    PHYSICAL EXAM:  General: alert, no acute distress  Eyes:  anicteric, no conjunctival injection, no discharge  Oropharynx: no lesions or injection 	  Neck: supple, without adenopathy  Lungs: clear to auscultation  Heart: regular rate and rhythm; systolic murmur  Abdomen: soft, nondistended, nontender, without mass or organomegaly  Skin: no lesions  Extremities: R LE immobilizer, dressings, PASCUAL  Neurologic: alert, oriented, moves all extremities    LAB RESULTS:                        9.4    9.8   )-----------( 162      ( 24 Mar 2018 07:12 )             28.8     03-24    141  |  105  |  19  ----------------------------<  128<H>  5.1   |  22  |  1.32<H>    Ca    9.0      24 Mar 2018 07:12      MICROBIOLOGY:  RECENT CULTURES:  03-24 @ 03:55 .Body Fluid Synovial Fluid-- rt knee  Few polymorphonuclear leukocytes seen per low power field  No organisms seen    03-24 @ 03:53 .Tissue Other, synovium #1    Few polymorphonuclear leukocytes seen per low power field  No organisms seen    03-24 @ 03:51 .Tissue Other, femoral canal   Numerous polymorphonuclear leukocytes seen per low power field  No organisms seen    03-24 @ 03:41 .Tissue Other, synovium #2  Few polymorphonuclear leukocytes seen per low power field  No organisms seen    03-24 @ 03:40 .Tissue Other, tibial canal   No polymorphonuclear cells seen per low power field  No organisms seen per oil power field    Culture - Tissue with Gram Stain (03.24.18 @ 03:41)    Gram Stain:   Few polymorphonuclear leukocytes per low power field  Numerous Gram positive cocci in pairs per oil power field    Specimen Source: .Tissue Other, knee wound culture    RADIOLOGY REVIEWED:  Xray Knee 1 or 2 Views, Right (03.23.18 @ 18:26) >  Interval postoperative changes of the knee with cement spacer and fusion   ruthann in place.

## 2018-03-24 NOTE — OCCUPATIONAL THERAPY INITIAL EVALUATION ADULT - STRENGTHENING, PT EVAL
Goal: Pt will be able to increase bilateral UE strength by 1 grade to maximize independence during functional transfers (sit<>stand, toilet, bed<>chair, tub) within 2 weeks.

## 2018-03-24 NOTE — OCCUPATIONAL THERAPY INITIAL EVALUATION ADULT - LEVEL OF INDEPENDENCE: STAND/SIT, REHAB EVAL
attempted to perform however pt demonstrated increased anxiety therefore lift device used for transfer

## 2018-03-24 NOTE — CONSULT NOTE ADULT - SUBJECTIVE AND OBJECTIVE BOX
Cardiology/Vascular Medicine Inpatient Consultation Note    POD #1 rt total knee spacer exchange    HISTORY OF PRESENT ILLNESS:  78 yo woman is POD # 1 Right Total Knee spacer exchange on 3/23/18.     She has HLD, GERD, Anxiety, Anemia, CAD s/p HERBERT, severe AS (s/p TAVR & PPM 12/17 [place] [employer] Model [id number] last interrogated 11/30/17, appointment on 3/22/18 to check PPM as per Cardiology office), h/o MVR, PMR on chronic steroids, asthma, OA, s/p TKR with recent joint infection s/p explant and abx spacer on 12/23/17, pt sent to rehab and was receiving DVT prophylaxis. Pt developed nosebleed post operatively and prophylaxis was D/C'd, pt then developed RLE DVT and is now on Coumadin.     Tolerated procedure well, received 2u pRBC, hemodynamically stable, pain appears to be controlled.  Patient remains anxious about prognosis.    Telemetry: SR with PVCs, NSVT      Allergies  amoxicillin (Other)    Intolerances  IV Contrast (Flushing (Skin); Nausea)  	  MEDICATIONS:  enoxaparin Injectable 100 milliGRAM(s) SubCutaneous every 12 hours    ceFAZolin   IVPB 2000 milliGRAM(s) IV Intermittent once  piperacillin/tazobactam IVPB. 3.375 Gram(s) IV Intermittent every 8 hours  vancomycin  IVPB 1750 milliGRAM(s) IV Intermittent daily    buDESOnide 160 MICROgram(s)/formoterol 4.5 MICROgram(s) Inhaler 2 Puff(s) Inhalation two times a day    acetaminophen   Tablet 650 milliGRAM(s) Oral every 6 hours PRN  acetaminophen   Tablet. 975 milliGRAM(s) Oral every 8 hours  acetaminophen   Tablet. 1000 milliGRAM(s) Oral once  ALPRAZolam 0.25 milliGRAM(s) Oral daily PRN  diphenhydrAMINE   Capsule 25 milliGRAM(s) Oral at bedtime PRN  diphenhydrAMINE   Capsule 25 milliGRAM(s) Oral every 4 hours PRN  gabapentin 300 milliGRAM(s) Oral every 12 hours  HYDROmorphone  Injectable 0.5 milliGRAM(s) IV Push every 4 hours PRN  ketorolac   Injectable 15 milliGRAM(s) IV Push once  ondansetron Injectable 4 milliGRAM(s) IV Push every 6 hours PRN  oxyCODONE    IR 5 milliGRAM(s) Oral every 4 hours PRN  oxyCODONE    IR 10 milliGRAM(s) Oral every 4 hours PRN  prochlorperazine   Injectable 5 milliGRAM(s) IV Push once PRN  traMADol 50 milliGRAM(s) Oral every 6 hours PRN  zolpidem 5 milliGRAM(s) Oral at bedtime PRN    aluminum hydroxide/magnesium hydroxide/simethicone Suspension 30 milliLiter(s) Oral four times a day PRN  docusate sodium 100 milliGRAM(s) Oral three times a day  magnesium hydroxide Suspension 30 milliLiter(s) Oral daily PRN  pantoprazole    Tablet 40 milliGRAM(s) Oral daily  polyethylene glycol 3350 17 Gram(s) Oral daily  senna 2 Tablet(s) Oral at bedtime PRN    simvastatin 20 milliGRAM(s) Oral at bedtime    ascorbic acid 500 milliGRAM(s) Oral two times a day  benzocaine 15 mG/menthol 3.6 mG Lozenge 1 Lozenge Oral every 4 hours PRN  calcium carbonate 1250 mG + Vitamin D (OsCal 500 + D) 1 Tablet(s) Oral three times a day  ferrous    sulfate 325 milliGRAM(s) Oral three times a day with meals  folic acid 1 milliGRAM(s) Oral daily  multivitamin 1 Tablet(s) Oral daily  sodium chloride 0.9%. 1000 milliLiter(s) IV Continuous <Continuous>      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  Artificial cardiac pacemaker: 12/25/17  H/O cardiac catheterization: 11/29/16 HERBERT placed on LAD  H/O endoscopy: with dilatation of esophageal stricture 2013  S/P cataract surgery: x2; 3-4yr ago  S/P gastroplasty: 28 yrs ago  S/P cholecystectomy: more than 15 years ago  S/P total knee replacement: left, 15yr ago  S/P total knee replacement: right, 12yr ago  S/P hysterectomy: 24yr ago      FAMILY HISTORY:  No pertinent family history in first degree relatives      SOCIAL HISTORY:    NC    REVIEW OF SYSTEMS:  [ ] Unable to obtain    PHYSICAL EXAM:  T(C): 36.7 (03-24-18 @ 04:26), Max: 37.6 (03-24-18 @ 01:58)  HR: 94 (03-24-18 @ 08:17) (60 - 104)  BP: 101/63 (03-24-18 @ 08:17) (84/56 - 145/67)  RR: 18 (03-24-18 @ 08:17) (14 - 20)  SpO2: 98% (03-24-18 @ 08:17) (92% - 100%)  Wt(kg): --  I&O's Summary    23 Mar 2018 07:01  -  24 Mar 2018 07:00  --------------------------------------------------------  IN: 1125 mL / OUT: 475 mL / NET: 650 mL    Appearance: NAD, chronically ill appearing, obese  HEENT:   Normal oral mucosa, PERRL, EOMI	  Lymphatic: No lymphadenopathy  Cardiovascular: Normal S1 S2, No JVD, + LE edema  Respiratory: Decreased BS b/l bases	  Psychiatry: Awake, alert  Gastrointestinal:  Soft, Non-tender, + BS	  Skin: No rashes, No ecchymoses, No cyanosis	  Neurologic: Non-focal  Extremities: +Rt knee brace, drainage in place  Vascular: Peripheral pulses palpable 2+ bilaterally      LABS:	 	                          9.4    9.8   )-----------( 162      ( 24 Mar 2018 07:12 )             28.8     03-24    141  |  105  |  19  ----------------------------<  128<H>  5.1   |  22  |  1.32<H>  03-23    138  |  105  |  15  ----------------------------<  171<H>  5.0   |  20<L>  |  0.84    Ca    9.0      24 Mar 2018 07:12  Ca    9.4      23 Mar 2018 19:36      < from: Limited Transthoracic Echo (12.27.17 @ 14:06) >    Patient name: IRASEMA KOHLER  YOB: 1940   Age: 77 (F)   MR#: [id number]  Study Date: 12/27/2017  Location: [id number]: Lauren Finkelstein  Study quality: Limited  Referring Physician: Sammy Galvan MD / Garo Sorto MD / Roverto Roblero MD  Blood Pressure: 139/52 mmHg  Height: 157 cm  Weight: 131 kg  BSA: 2.2 m2  ------------------------------------------------------------------------  PROCEDURE: Limited transthoracic echocardiogram with 2-D.  M-Mode and spectral and color flow Doppler.  INDICATION: Endocarditis, valve unspecified (I38)  ------------------------------------------------------------------------  OBSERVATIONS:  Mitral Valve: Mitral annular calcification and calcified  mitral leaflets with decreased diastolic opening. Peak  mitral valve gradient equals 14 mm Hg, mean transmitral  valve gradient equals 6 mm Hg, consistent with moderate  mitral stenosis.  Aortic Root: Normal aortic root, aortic arch and descending  thoracic aorta.  Aortic Valve:Bioprosthetic aortic valve replacement. s/p  TAVR.  Left Atrium: Normal left atrium.  Left Ventricle: Normal left ventricular systolic function.  No segmental wall motion abnormalities. Normal left  ventricular internal dimensions and wall thicknesses.  Right Heart: Normal right atrium. Normal right ventricular  size and function. Normal tricuspid valve. Normal pulmonic  valve.  Pericardium/PleuraNormal pericardium with no pericardial  effusion.  ------------------------------------------------------------------------  CONCLUSIONS:  1. Mitral annular calcification and calcified mitral  leaflets with decreased diastolic opening. Peak mitral  valve gradient equals 14 mm Hg, mean transmitral valve  gradient equals 6 mm Hg, consistent with moderate mitral  stenosis.  2. Bioprosthetic aortic valve replacement. s/p TAVR.  3. Normal left ventricular systolic function. No segmental  wall motion abnormalities.  *** Compared with echocardiogram of 12/19/2017, no  significant changes noted. There isno obvious evidence of  endocarditis.  ------------------------------------------------------------------------  Confirmed on  12/27/2017 - 17:13:04 by Filipe Esparza M.D.  ------------------------------------------------------------------------    < end of copied text >      < from: Xray Knee 1 or 2 Views, Right (03.23.18 @ 18:26) >    EXAM:  KNEE; 1 OR 2 VIEWS - RIGHT                            PROCEDURE DATE:  03/23/2018            INTERPRETATION:  INDICATION: post-op. Pain    COMPARISON: Knee radiographs dated 1/25/2018    FINDING: Two views of the knee demonstrates interval placement of an   intramedullary ruthann bridging the distal femur and proximal tibia. There is   cement material within the knee joint space. The findings are compatible   with arthrodesis and cement spacer placement. There are surgical clips,   soft tissue swelling and draining keeping with recent surgery.    IMPRESSION:   Interval postoperative changes of the knee with cement spacer and fusion   ruthann in place.        AARTI DIEGO M.D., ATTENDING RADIOLOGIST  This document has been electronically signed. Mar 24 2018 12:15AM        < end of copied text >

## 2018-03-24 NOTE — PROGRESS NOTE ADULT - SUBJECTIVE AND OBJECTIVE BOX
Plastic Surgery Progress Note (pg [facility]: [id number], [facility]: [phone])    SUBJECTIVE:  Doing well. No overnight events. Pain is moderately controlled; c/o irritation of dressings, concerned about ambulating if Navarrete removed.     OBJECTIVE:     ** VITAL SIGNS / I&O's **    Vital Signs Last 24 Hrs  T(C): 36.7 (24 Mar 2018 04:26), Max: 37.6 (24 Mar 2018 01:58)  T(F): 98 (24 Mar 2018 04:26), Max: 99.7 (24 Mar 2018 01:58)  HR: 94 (24 Mar 2018 08:17) (60 - 104)  BP: 101/63 (24 Mar 2018 08:17) (84/56 - 145/67)  BP(mean): 79 (24 Mar 2018 01:00) (79 - 96)  RR: 18 (24 Mar 2018 08:17) (14 - 20)  SpO2: 98% (24 Mar 2018 08:17) (92% - 100%)      23 Mar 2018 07:01  -  24 Mar 2018 07:00  --------------------------------------------------------  IN:    IV PiggyBack: 50 mL    Oral Fluid: 100 mL    sodium chloride 0.9%.: 975 mL  Total IN: 1125 mL    OUT:    Drain: 60 mL    Indwelling Catheter - Urethral: 415 mL  Total OUT: 475 mL    Total NET: 650 mL          ** PHYSICAL EXAM **    -- CONSTITUTIONAL: AOx3. NAD.   -- EXTREMITIES: RLE dressing c/d/i, PASCUAL sero<sang; Prevena holding suction      **MEDS**  acetaminophen   Tablet 650 milliGRAM(s) Oral every 6 hours PRN  acetaminophen   Tablet. 975 milliGRAM(s) Oral every 8 hours  acetaminophen   Tablet. 1000 milliGRAM(s) Oral once  ALPRAZolam 0.25 milliGRAM(s) Oral daily PRN  aluminum hydroxide/magnesium hydroxide/simethicone Suspension 30 milliLiter(s) Oral four times a day PRN  ascorbic acid 500 milliGRAM(s) Oral two times a day  benzocaine 15 mG/menthol 3.6 mG Lozenge 1 Lozenge Oral every 4 hours PRN  buDESOnide 160 MICROgram(s)/formoterol 4.5 MICROgram(s) Inhaler 2 Puff(s) Inhalation two times a day  calcium carbonate 1250 mG + Vitamin D (OsCal 500 + D) 1 Tablet(s) Oral three times a day  ceFAZolin   IVPB 2000 milliGRAM(s) IV Intermittent once  diphenhydrAMINE   Capsule 25 milliGRAM(s) Oral at bedtime PRN  diphenhydrAMINE   Capsule 25 milliGRAM(s) Oral every 4 hours PRN  docusate sodium 100 milliGRAM(s) Oral three times a day  enoxaparin Injectable 100 milliGRAM(s) SubCutaneous every 12 hours  ferrous    sulfate 325 milliGRAM(s) Oral three times a day with meals  folic acid 1 milliGRAM(s) Oral daily  gabapentin 300 milliGRAM(s) Oral every 12 hours  HYDROmorphone  Injectable 0.5 milliGRAM(s) IV Push every 4 hours PRN  ketorolac   Injectable 15 milliGRAM(s) IV Push once  magnesium hydroxide Suspension 30 milliLiter(s) Oral daily PRN  multivitamin 1 Tablet(s) Oral daily  ondansetron Injectable 4 milliGRAM(s) IV Push every 6 hours PRN  oxyCODONE    IR 5 milliGRAM(s) Oral every 4 hours PRN  oxyCODONE    IR 10 milliGRAM(s) Oral every 4 hours PRN  pantoprazole    Tablet 40 milliGRAM(s) Oral daily  piperacillin/tazobactam IVPB. 3.375 Gram(s) IV Intermittent every 8 hours  polyethylene glycol 3350 17 Gram(s) Oral daily  prochlorperazine   Injectable 5 milliGRAM(s) IV Push once PRN  senna 2 Tablet(s) Oral at bedtime PRN  simvastatin 20 milliGRAM(s) Oral at bedtime  sodium chloride 0.9% lock flush 3 milliLiter(s) IV Push every 8 hours  sodium chloride 0.9%. 1000 milliLiter(s) IV Continuous <Continuous>  traMADol 50 milliGRAM(s) Oral every 6 hours PRN  vancomycin  IVPB 1750 milliGRAM(s) IV Intermittent daily  zolpidem 5 milliGRAM(s) Oral at bedtime PRN      ** LABS **                          9.4    9.8   )-----------( 162      ( 24 Mar 2018 07:12 )             28.8     24 Mar 2018 07:12    141    |  105    |  19     ----------------------------<  128    5.1     |  22     |  1.32     Ca    9.0        24 Mar 2018 07:12      PT/INR - ( 23 Mar 2018 19:36 )   PT: 14.9 sec;   INR: 1.37 ratio         PTT - ( 23 Mar 2018 19:36 )  PTT:34.8 sec  CAPILLARY BLOOD GLUCOSE

## 2018-03-24 NOTE — OCCUPATIONAL THERAPY INITIAL EVALUATION ADULT - ADL RETRAINING, OT EVAL
Pt will be set up for grooming in bed within 2 weeks. Goal: Pt will be set up for grooming in bed within 2 weeks.

## 2018-03-24 NOTE — PROVIDER CONTACT NOTE (OTHER) - ASSESSMENT
Pt Aox4, T 97.3, , BP 84/56 manual, RR16 O2 99%. pt c/o of slight chest palpitation that went away, denies chest pain, sob.

## 2018-03-24 NOTE — PHYSICAL THERAPY INITIAL EVALUATION ADULT - PERTINENT HX OF CURRENT PROBLEM, REHAB EVAL
Pt is a 78yo F w/ extensive PMHx as written below with hx of recent TKR followed by stage 1 revision for PJI in Dec 2017.  pt now presents for explant of spacer, I&D, placement of static spacer and placement of intramedullary ruthann. (temporary arthrodesis)

## 2018-03-24 NOTE — PROGRESS NOTE ADULT - ASSESSMENT
76 yo F s/p Removal of articulating abx cement spacer, Rev to Non articulating Spacer POD 1  Analgesia  FU ORCx  FU BCx   FU ID C/s- Danica paged  FU Cards C/s- aware- appreciated recs for tele  DVT ppx- Th L, bridge to coumadin  Incentive spirometry  50% PWB In KI at all times, no knee flexion allowed   P/T

## 2018-03-24 NOTE — CONSULT NOTE ADULT - ASSESSMENT
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 12/'17  Completed IV CTX and brief po abx at rehab; off all abx > 1 month  Poor wound healing prompting wound revision, spacer exchange and fusion ruthann as of yest  Multiple op specimens sent, single with GPC prs seen on GS- ?same S sanguinis vs new/secondary infection  She is afebrile, stable post op, currently on Vanco and Zosyn    Plan:  Await final Microbiology results   Continue present regimen- Vanco + Zosyn- for now, likely deescalate based on further Cx results  Whether we will need another extended IV course vs po, to be determined; previous rec for extended po was also in regard to TAVR and PPM, which may have been at risk during previous bacteremic event  D/w pt and daughter at length- issue of recurrent S sanguinis vs new infection, ?hardware involvement, benefit of extended po abs, all reviewed  Above represents complex medical decision making, > 35 mins spent in direct assessment of the patient, analysis of all pertinent data, discussion, counseling, and coordination of care; benefit, possible adverse effects, and limitations of antibiotics reviewed

## 2018-03-24 NOTE — OCCUPATIONAL THERAPY INITIAL EVALUATION ADULT - PERTINENT HX OF CURRENT PROBLEM, REHAB EVAL
77 f presents to PST for a Right Total Knee spacer exchange on 3/23/18. Pt with extensive PMH including HLD, GERD, Anxiety, Anemia, CAD s/p HERBERT, severe AS (s/p TAVR & PPM 12/17 [place] [employer] Model [id number] last interrogated 11/30/17, appointment on 3/22/18 to check PPM as per Cardiology office), h/o MVR, PMR on chronic steroids, asthma, OA, s/p TKR with recent joint infection s/p explant and abx spacer on 12/23/17, pt sent to rehab and was receiving DVT prophylaxis....see below

## 2018-03-24 NOTE — PROVIDER CONTACT NOTE (OTHER) - ACTION/TREATMENT ORDERED:
MD Rogers made aware; per MD Rogers recheck BP in 0430am and report. Will continue to monitor. MD will come to bedside to assess patient.

## 2018-03-24 NOTE — PHYSICAL THERAPY INITIAL EVALUATION ADULT - ADDITIONAL COMMENTS
pt has not been ambulatory since Dec due to knee surgeries, pt reports being bedbound since feb due to pain and knee condition.  pt owns a patient lift device, w/c, RW.  pt lives in an elevator building.  pt has a HHA 12h/d x7d/wk who assists her with all mobility and ADL's

## 2018-03-24 NOTE — PROGRESS NOTE ADULT - ASSESSMENT
A/P: 77F s/p R total knee insertion of spacer, irrigation and debridement, complex wound closure (POD 1)  - Pain control  - C/w dressings  - Prevena x5 days  -  care  - Care per primary team

## 2018-03-24 NOTE — PROVIDER CONTACT NOTE (OTHER) - ACTION/TREATMENT ORDERED:
Continue to monitor patient and states that he talked with anesthesia about irregular rhythm. MD wants patient to transfer to [address] [place] [address].

## 2018-03-24 NOTE — PHYSICAL THERAPY INITIAL EVALUATION ADULT - DID THE PATIENT HAVE SURGERY?
removal of dynamic cement spacer, I&D then placement of static cement spacer with intramedullary ruthann/yes

## 2018-03-24 NOTE — OCCUPATIONAL THERAPY INITIAL EVALUATION ADULT - IMPAIRED TRANSFERS: BED/CHAIR, REHAB EVAL
Increased anxiety noted/impaired postural control/decreased strength/decreased ROM/impaired balance/decreased flexibility

## 2018-03-24 NOTE — OCCUPATIONAL THERAPY INITIAL EVALUATION ADULT - IMPAIRED TRANSFERS: SIT/STAND, REHAB EVAL
increased anxiety noted/decreased strength/decreased flexibility/impaired balance/impaired postural control

## 2018-03-24 NOTE — CONSULT NOTE ADULT - ASSESSMENT
POD #1    Clinically stable from the cardiac perspective.  Stable volume status.  Resume low dose beta blocker when able/ if BP tolerates ( ie metoprolol 12.5 BID)  Tolerating enoxaparin at this time.  Transition to warfarin when hemostasis assured/no further procedures planned.  On IV abx as per primary team.  Will follow.    Roverto Roblero  Pager [phone]

## 2018-03-25 ENCOUNTER — MOBILE ON CALL (OUTPATIENT)
Age: 78
End: 2018-03-25

## 2018-03-25 DIAGNOSIS — D50.0 IRON DEFICIENCY ANEMIA SECONDARY TO BLOOD LOSS (CHRONIC): ICD-10-CM

## 2018-03-25 DIAGNOSIS — N17.9 ACUTE KIDNEY FAILURE, UNSPECIFIED: ICD-10-CM

## 2018-03-25 LAB
ANION GAP SERPL CALC-SCNC: 13 MMOL/L — SIGNIFICANT CHANGE UP (ref 5–17)
BLD GP AB SCN SERPL QL: POSITIVE — SIGNIFICANT CHANGE UP
BUN SERPL-MCNC: 26 MG/DL — HIGH (ref 7–23)
CALCIUM SERPL-MCNC: 8 MG/DL — LOW (ref 8.4–10.5)
CHLORIDE SERPL-SCNC: 105 MMOL/L — SIGNIFICANT CHANGE UP (ref 96–108)
CHLORIDE UR-SCNC: <35 MMOL/L — SIGNIFICANT CHANGE UP
CO2 SERPL-SCNC: 20 MMOL/L — LOW (ref 22–31)
CREAT ?TM UR-MCNC: 198 MG/DL — SIGNIFICANT CHANGE UP
CREAT SERPL-MCNC: 1.91 MG/DL — HIGH (ref 0.5–1.3)
GLUCOSE BLDC GLUCOMTR-MCNC: 88 MG/DL — SIGNIFICANT CHANGE UP (ref 70–99)
GLUCOSE SERPL-MCNC: 98 MG/DL — SIGNIFICANT CHANGE UP (ref 70–99)
HCT VFR BLD CALC: 19.6 % — CRITICAL LOW (ref 34.5–45)
HCT VFR BLD CALC: 20.2 % — CRITICAL LOW (ref 34.5–45)
HCT VFR BLD CALC: 22 % — LOW (ref 34.5–45)
HGB BLD-MCNC: 6.4 G/DL — CRITICAL LOW (ref 11.5–15.5)
HGB BLD-MCNC: 6.4 G/DL — CRITICAL LOW (ref 11.5–15.5)
HGB BLD-MCNC: 7.4 G/DL — LOW (ref 11.5–15.5)
INR BLD: 3.92 RATIO — HIGH (ref 0.88–1.16)
INR BLD: 3.93 RATIO — HIGH (ref 0.88–1.16)
MCHC RBC-ENTMCNC: 27.9 PG — SIGNIFICANT CHANGE UP (ref 27–34)
MCHC RBC-ENTMCNC: 28.8 PG — SIGNIFICANT CHANGE UP (ref 27–34)
MCHC RBC-ENTMCNC: 29.4 PG — SIGNIFICANT CHANGE UP (ref 27–34)
MCHC RBC-ENTMCNC: 31.7 GM/DL — LOW (ref 32–36)
MCHC RBC-ENTMCNC: 33 GM/DL — SIGNIFICANT CHANGE UP (ref 32–36)
MCHC RBC-ENTMCNC: 33.6 GM/DL — SIGNIFICANT CHANGE UP (ref 32–36)
MCV RBC AUTO: 87.3 FL — SIGNIFICANT CHANGE UP (ref 80–100)
MCV RBC AUTO: 87.3 FL — SIGNIFICANT CHANGE UP (ref 80–100)
MCV RBC AUTO: 88.2 FL — SIGNIFICANT CHANGE UP (ref 80–100)
NRBC # BLD: 0 /100 WBCS — SIGNIFICANT CHANGE UP (ref 0–0)
NRBC # BLD: 0 /100 WBCS — SIGNIFICANT CHANGE UP (ref 0–0)
OSMOLALITY UR: 355 MOS/KG — SIGNIFICANT CHANGE UP (ref 300–900)
PLATELET # BLD AUTO: 119 K/UL — LOW (ref 150–400)
PLATELET # BLD AUTO: 125 K/UL — LOW (ref 150–400)
PLATELET # BLD AUTO: 132 K/UL — LOW (ref 150–400)
POTASSIUM SERPL-MCNC: 4.4 MMOL/L — SIGNIFICANT CHANGE UP (ref 3.5–5.3)
POTASSIUM SERPL-SCNC: 4.4 MMOL/L — SIGNIFICANT CHANGE UP (ref 3.5–5.3)
POTASSIUM UR-SCNC: 55 MMOL/L — SIGNIFICANT CHANGE UP
PROTHROM AB SERPL-ACNC: 45.5 SEC — HIGH (ref 10–13.1)
PROTHROM AB SERPL-ACNC: 45.7 SEC — HIGH (ref 10–13.1)
RBC # BLD: 2.24 M/UL — LOW (ref 3.8–5.2)
RBC # BLD: 2.29 M/UL — LOW (ref 3.8–5.2)
RBC # BLD: 2.52 M/UL — LOW (ref 3.8–5.2)
RBC # FLD: 15.7 % — HIGH (ref 10.3–14.5)
RBC # FLD: 16.7 % — HIGH (ref 10.3–14.5)
RBC # FLD: 17.3 % — HIGH (ref 10.3–14.5)
RH IG SCN BLD-IMP: POSITIVE — SIGNIFICANT CHANGE UP
SODIUM SERPL-SCNC: 138 MMOL/L — SIGNIFICANT CHANGE UP (ref 135–145)
SODIUM UR-SCNC: 28 MMOL/L — SIGNIFICANT CHANGE UP
VANCOMYCIN TROUGH SERPL-MCNC: 14.9 UG/ML — SIGNIFICANT CHANGE UP (ref 10–20)
WBC # BLD: 6.28 K/UL — SIGNIFICANT CHANGE UP (ref 3.8–10.5)
WBC # BLD: 6.6 K/UL — SIGNIFICANT CHANGE UP (ref 3.8–10.5)
WBC # BLD: 7.01 K/UL — SIGNIFICANT CHANGE UP (ref 3.8–10.5)
WBC # FLD AUTO: 6.28 K/UL — SIGNIFICANT CHANGE UP (ref 3.8–10.5)
WBC # FLD AUTO: 6.6 K/UL — SIGNIFICANT CHANGE UP (ref 3.8–10.5)
WBC # FLD AUTO: 7.01 K/UL — SIGNIFICANT CHANGE UP (ref 3.8–10.5)

## 2018-03-25 PROCEDURE — 71045 X-RAY EXAM CHEST 1 VIEW: CPT | Mod: 26

## 2018-03-25 PROCEDURE — 99233 SBSQ HOSP IP/OBS HIGH 50: CPT

## 2018-03-25 RX ORDER — SODIUM CHLORIDE 9 MG/ML
1000 INJECTION INTRAMUSCULAR; INTRAVENOUS; SUBCUTANEOUS
Qty: 0 | Refills: 0 | Status: DISCONTINUED | OUTPATIENT
Start: 2018-03-25 | End: 2018-03-26

## 2018-03-25 RX ORDER — SODIUM CHLORIDE 9 MG/ML
1000 INJECTION INTRAMUSCULAR; INTRAVENOUS; SUBCUTANEOUS
Qty: 0 | Refills: 0 | Status: DISCONTINUED | OUTPATIENT
Start: 2018-03-25 | End: 2018-03-25

## 2018-03-25 RX ORDER — PHYTONADIONE (VIT K1) 5 MG
5 TABLET ORAL ONCE
Qty: 0 | Refills: 0 | Status: COMPLETED | OUTPATIENT
Start: 2018-03-25 | End: 2018-03-25

## 2018-03-25 RX ORDER — VANCOMYCIN HCL 1 G
1000 VIAL (EA) INTRAVENOUS EVERY 24 HOURS
Qty: 0 | Refills: 0 | Status: DISCONTINUED | OUTPATIENT
Start: 2018-03-25 | End: 2018-03-26

## 2018-03-25 RX ORDER — PHYTONADIONE (VIT K1) 5 MG
2.5 TABLET ORAL ONCE
Qty: 0 | Refills: 0 | Status: COMPLETED | OUTPATIENT
Start: 2018-03-25 | End: 2018-03-25

## 2018-03-25 RX ADMIN — Medication 975 MILLIGRAM(S): at 17:28

## 2018-03-25 RX ADMIN — Medication 500 MILLIGRAM(S): at 05:47

## 2018-03-25 RX ADMIN — SODIUM CHLORIDE 75 MILLILITER(S): 9 INJECTION INTRAMUSCULAR; INTRAVENOUS; SUBCUTANEOUS at 16:24

## 2018-03-25 RX ADMIN — SODIUM CHLORIDE 3 MILLILITER(S): 9 INJECTION INTRAMUSCULAR; INTRAVENOUS; SUBCUTANEOUS at 05:54

## 2018-03-25 RX ADMIN — Medication 250 MILLIGRAM(S): at 17:27

## 2018-03-25 RX ADMIN — Medication 325 MILLIGRAM(S): at 12:51

## 2018-03-25 RX ADMIN — OXYCODONE HYDROCHLORIDE 10 MILLIGRAM(S): 5 TABLET ORAL at 20:26

## 2018-03-25 RX ADMIN — Medication 100 MILLIGRAM(S): at 23:01

## 2018-03-25 RX ADMIN — BUDESONIDE AND FORMOTEROL FUMARATE DIHYDRATE 2 PUFF(S): 160; 4.5 AEROSOL RESPIRATORY (INHALATION) at 05:46

## 2018-03-25 RX ADMIN — Medication 975 MILLIGRAM(S): at 09:00

## 2018-03-25 RX ADMIN — Medication 325 MILLIGRAM(S): at 08:02

## 2018-03-25 RX ADMIN — Medication 1 MILLIGRAM(S): at 12:51

## 2018-03-25 RX ADMIN — Medication 1 TABLET(S): at 23:07

## 2018-03-25 RX ADMIN — Medication 975 MILLIGRAM(S): at 00:15

## 2018-03-25 RX ADMIN — Medication 325 MILLIGRAM(S): at 17:27

## 2018-03-25 RX ADMIN — CYCLOBENZAPRINE HYDROCHLORIDE 5 MILLIGRAM(S): 10 TABLET, FILM COATED ORAL at 17:28

## 2018-03-25 RX ADMIN — SODIUM CHLORIDE 75 MILLILITER(S): 9 INJECTION INTRAMUSCULAR; INTRAVENOUS; SUBCUTANEOUS at 17:00

## 2018-03-25 RX ADMIN — PANTOPRAZOLE SODIUM 40 MILLIGRAM(S): 20 TABLET, DELAYED RELEASE ORAL at 12:51

## 2018-03-25 RX ADMIN — Medication 100 MILLIGRAM(S): at 17:27

## 2018-03-25 RX ADMIN — Medication 500 MILLIGRAM(S): at 17:28

## 2018-03-25 RX ADMIN — SODIUM CHLORIDE 3 MILLILITER(S): 9 INJECTION INTRAMUSCULAR; INTRAVENOUS; SUBCUTANEOUS at 13:37

## 2018-03-25 RX ADMIN — TRAMADOL HYDROCHLORIDE 50 MILLIGRAM(S): 50 TABLET ORAL at 10:40

## 2018-03-25 RX ADMIN — SODIUM CHLORIDE 3 MILLILITER(S): 9 INJECTION INTRAMUSCULAR; INTRAVENOUS; SUBCUTANEOUS at 22:58

## 2018-03-25 RX ADMIN — Medication 1 TABLET(S): at 17:28

## 2018-03-25 RX ADMIN — Medication 2.5 MILLIGRAM(S): at 23:39

## 2018-03-25 RX ADMIN — Medication 15 MILLIGRAM(S): at 06:20

## 2018-03-25 RX ADMIN — OXYCODONE HYDROCHLORIDE 5 MILLIGRAM(S): 5 TABLET ORAL at 13:37

## 2018-03-25 RX ADMIN — Medication 975 MILLIGRAM(S): at 08:03

## 2018-03-25 RX ADMIN — SODIUM CHLORIDE 160 MILLILITER(S): 9 INJECTION INTRAMUSCULAR; INTRAVENOUS; SUBCUTANEOUS at 05:46

## 2018-03-25 RX ADMIN — Medication 100 MILLIGRAM(S): at 05:48

## 2018-03-25 RX ADMIN — ENOXAPARIN SODIUM 100 MILLIGRAM(S): 100 INJECTION SUBCUTANEOUS at 05:48

## 2018-03-25 RX ADMIN — OXYCODONE HYDROCHLORIDE 10 MILLIGRAM(S): 5 TABLET ORAL at 00:31

## 2018-03-25 RX ADMIN — BUDESONIDE AND FORMOTEROL FUMARATE DIHYDRATE 2 PUFF(S): 160; 4.5 AEROSOL RESPIRATORY (INHALATION) at 17:27

## 2018-03-25 RX ADMIN — Medication 975 MILLIGRAM(S): at 18:25

## 2018-03-25 RX ADMIN — OXYCODONE HYDROCHLORIDE 10 MILLIGRAM(S): 5 TABLET ORAL at 01:25

## 2018-03-25 RX ADMIN — SODIUM CHLORIDE 100 MILLILITER(S): 9 INJECTION INTRAMUSCULAR; INTRAVENOUS; SUBCUTANEOUS at 11:13

## 2018-03-25 RX ADMIN — Medication 1 TABLET(S): at 05:48

## 2018-03-25 RX ADMIN — Medication 975 MILLIGRAM(S): at 23:02

## 2018-03-25 RX ADMIN — PIPERACILLIN AND TAZOBACTAM 25 GRAM(S): 4; .5 INJECTION, POWDER, LYOPHILIZED, FOR SOLUTION INTRAVENOUS at 08:02

## 2018-03-25 RX ADMIN — CYCLOBENZAPRINE HYDROCHLORIDE 5 MILLIGRAM(S): 10 TABLET, FILM COATED ORAL at 05:48

## 2018-03-25 RX ADMIN — Medication 1 TABLET(S): at 12:51

## 2018-03-25 RX ADMIN — TRAMADOL HYDROCHLORIDE 50 MILLIGRAM(S): 50 TABLET ORAL at 09:41

## 2018-03-25 RX ADMIN — Medication 5 MILLIGRAM(S): at 13:17

## 2018-03-25 RX ADMIN — Medication 15 MILLIGRAM(S): at 05:48

## 2018-03-25 RX ADMIN — OXYCODONE HYDROCHLORIDE 10 MILLIGRAM(S): 5 TABLET ORAL at 19:27

## 2018-03-25 RX ADMIN — OXYCODONE HYDROCHLORIDE 5 MILLIGRAM(S): 5 TABLET ORAL at 14:35

## 2018-03-25 RX ADMIN — SIMVASTATIN 20 MILLIGRAM(S): 20 TABLET, FILM COATED ORAL at 23:02

## 2018-03-25 RX ADMIN — Medication 15 MILLIGRAM(S): at 00:15

## 2018-03-25 NOTE — CONSULT NOTE ADULT - ASSESSMENT
77 yr female PMH HTN HLD CAD s/p PCI Severe AV s/p TAVR SSS s/p PPM Polymyalgia Anxiety GERD chronic back pain  s/p Right Total Knee spacer exchange now with YONATAN on CKD3

## 2018-03-25 NOTE — DIETITIAN INITIAL EVALUATION ADULT. - ADHERENCE
fair/Pt reports no h/o DM2, prior steroid Rx was discontinued weeks ago per pt. Pt reports minimal sodium intake due to limited solid food intake.

## 2018-03-25 NOTE — DIETITIAN INITIAL EVALUATION ADULT. - FACTORS AFF FOOD INTAKE
Nursing flowhseet indicates pt is consuming % of meals. Pt denies chewing/swallowing difficulty but reports solid food feels "stuck in her chest" and she sometimes vomits after eating solid food. Pt reports she is drinking at least 1 Nepro daily in-house, but eating very little (question accuracy). No BM noted.

## 2018-03-25 NOTE — PROGRESS NOTE ADULT - SUBJECTIVE AND OBJECTIVE BOX
Orthopedic Surgery Progress Note  S: Pain is well controlled.  No nausea, vomiting, chest pain, shortness of breath, lightheadedness, or dizziness. Patient without complaints currently. Has had runs of tachyarrhythmia on tele, discussed with cardiology, possibly just the paced rhythm.    O:  Vital Signs Last 24 Hrs  T(C): 36.9 (25 Mar 2018 05:44), Max: 37.1 (24 Mar 2018 20:46)  T(F): 98.5 (25 Mar 2018 05:44), Max: 98.7 (24 Mar 2018 20:46)  HR: 84 (25 Mar 2018 05:44) (84 - 98)  BP: 105/58 (25 Mar 2018 05:44) (91/59 - 106/65)  RR: 18 (25 Mar 2018 05:44) (18 - 18)  SpO2: 100% (25 Mar 2018 05:44) (97% - 100%)    Gen: NAD  RLE  Dressing clean, dry, intact in BJKI  EHL/FHL/TA/GS intact  SILT DP/SP/Stone/Sa  WWP distally                        9.4    9.8   )-----------( 162      ( 24 Mar 2018 07:12 )             28.8                         10.5   11.7  )-----------( 201      ( 23 Mar 2018 19:36 )             31.4     03-24    141  |  105  |  19  ----------------------------<  128<H>  5.1   |  22  |  1.32<H>    PT/INR - ( 24 Mar 2018 11:12 )   PT: 22.4 sec;   INR: 2.03 ratio      PTT - ( 24 Mar 2018 11:12 )  PTT:33.1 sec    78 yo F s/p Removal of articulating abx cement spacer, Rev to Non articulating Spacer POD 2    Analgesia  FU ORCx: GPC on wound culture so far  FU BCx   FU ID C/s - waiting on culture results  FU Cards input regarding arrhythmia, possible tx to 7tower today  DVT ppx- Th L, bridge to coumadin  Incentive spirometry  50% PWB In KI at all times, no knee flexion allowed   P/T    Maik Blanco MD

## 2018-03-25 NOTE — PROVIDER CONTACT NOTE (CRITICAL VALUE NOTIFICATION) - SITUATION
Patient had critical lab results of cultures collected on 3/23.  culture 1 on 3/23-other femoral canal has rare staph species. 2nd culture on 3/23 - other synovial number has rare staph aurus. 3rd culture on 3/23 of right knee synovial fluid rare staph aurus

## 2018-03-25 NOTE — DIETITIAN INITIAL EVALUATION ADULT. - ENERGY NEEDS
ht: 5 feet 3 inches, wt: 260 pounds, BMI: 46.1 Kg/m2, IBW: 115pounds (+/- 10%), 226% IBW. Edema: 1+ R leg; Skin: no pressure injuries.

## 2018-03-25 NOTE — PROGRESS NOTE ADULT - SUBJECTIVE AND OBJECTIVE BOX
Plastic Surgery Progress Note (pg [facility]: [id number], [facility]: [phone])    SUBJECTIVE:  Doing ok. Became light-headed while trying to work w/ PT yesterday, says pain appropriately managed w/ medications.     OBJECTIVE:     ** VITAL SIGNS / I&O's **    Vital Signs Last 24 Hrs  T(C): 36.9 (25 Mar 2018 05:44), Max: 37.1 (24 Mar 2018 20:46)  T(F): 98.5 (25 Mar 2018 05:44), Max: 98.7 (24 Mar 2018 20:46)  HR: 84 (25 Mar 2018 05:44) (84 - 98)  BP: 105/58 (25 Mar 2018 05:44) (91/59 - 106/65)  BP(mean): --  RR: 18 (25 Mar 2018 05:44) (18 - 18)  SpO2: 100% (25 Mar 2018 05:44) (97% - 100%)      24 Mar 2018 07:01  -  25 Mar 2018 07:00  --------------------------------------------------------  IN:    Oral Fluid: 600 mL    sodium chloride 0.9%.: 3520 mL  Total IN: 4120 mL    OUT:    Drain: 40 mL    Indwelling Catheter - Urethral: 800 mL  Total OUT: 840 mL    Total NET: 3280 mL          ** PHYSICAL EXAM **    -- CONSTITUTIONAL: AOx3. NAD.   -- EXTREMITIES: RLE dressing c/d/i, PASCUAL ss, VAC holding suction      **MEDS**  acetaminophen   Tablet 650 milliGRAM(s) Oral every 6 hours PRN  acetaminophen   Tablet. 975 milliGRAM(s) Oral every 8 hours  acetaminophen   Tablet. 1000 milliGRAM(s) Oral once  ALPRAZolam 0.25 milliGRAM(s) Oral daily PRN  aluminum hydroxide/magnesium hydroxide/simethicone Suspension 30 milliLiter(s) Oral four times a day PRN  ascorbic acid 500 milliGRAM(s) Oral two times a day  benzocaine 15 mG/menthol 3.6 mG Lozenge 1 Lozenge Oral every 4 hours PRN  bisacodyl Suppository 10 milliGRAM(s) Rectal daily PRN  buDESOnide 160 MICROgram(s)/formoterol 4.5 MICROgram(s) Inhaler 2 Puff(s) Inhalation two times a day  calcium carbonate 1250 mG + Vitamin D (OsCal 500 + D) 1 Tablet(s) Oral three times a day  ceFAZolin   IVPB 2000 milliGRAM(s) IV Intermittent once  celecoxib 100 milliGRAM(s) Oral two times a day  cyclobenzaprine 5 milliGRAM(s) Oral two times a day  diphenhydrAMINE   Capsule 25 milliGRAM(s) Oral at bedtime PRN  diphenhydrAMINE   Capsule 25 milliGRAM(s) Oral every 4 hours PRN  docusate sodium 100 milliGRAM(s) Oral three times a day  enoxaparin Injectable 100 milliGRAM(s) SubCutaneous every 12 hours  ferrous    sulfate 325 milliGRAM(s) Oral three times a day with meals  folic acid 1 milliGRAM(s) Oral daily  ketorolac   Injectable 15 milliGRAM(s) IV Push once  magnesium hydroxide Suspension 30 milliLiter(s) Oral daily PRN  multivitamin 1 Tablet(s) Oral daily  ondansetron Injectable 4 milliGRAM(s) IV Push every 6 hours PRN  oxyCODONE    IR 5 milliGRAM(s) Oral every 4 hours PRN  oxyCODONE    IR 10 milliGRAM(s) Oral every 4 hours PRN  pantoprazole    Tablet 40 milliGRAM(s) Oral daily  piperacillin/tazobactam IVPB. 3.375 Gram(s) IV Intermittent every 8 hours  polyethylene glycol 3350 17 Gram(s) Oral daily  prochlorperazine   Injectable 5 milliGRAM(s) IV Push once PRN  senna 2 Tablet(s) Oral at bedtime PRN  simvastatin 20 milliGRAM(s) Oral at bedtime  sodium chloride 0.9% lock flush 3 milliLiter(s) IV Push every 8 hours  sodium chloride 0.9%. 1000 milliLiter(s) IV Continuous <Continuous>  traMADol 50 milliGRAM(s) Oral every 6 hours PRN  vancomycin  IVPB 1750 milliGRAM(s) IV Intermittent daily  zolpidem 5 milliGRAM(s) Oral at bedtime PRN      ** LABS **                          9.4    9.8   )-----------( 162      ( 24 Mar 2018 07:12 )             28.8     24 Mar 2018 07:12    141    |  105    |  19     ----------------------------<  128    5.1     |  22     |  1.32     Ca    9.0        24 Mar 2018 07:12      PT/INR - ( 24 Mar 2018 11:12 )   PT: 22.4 sec;   INR: 2.03 ratio         PTT - ( 24 Mar 2018 11:12 )  PTT:33.1 sec

## 2018-03-25 NOTE — PROGRESS NOTE ADULT - SUBJECTIVE AND OBJECTIVE BOX
CC: f/u for R knee PJI    Patient reports still with R knee pain    REVIEW OF SYSTEMS:  All other review of systems negative (Comprehensive ROS)    Antimicrobials POD # 2  Vanco on hold in view of increased Creat, Zosyn d/kimber    Other Medications Reviewed    T(F): 97.9 (03-25-18 @ 11:54), Max: 98.7 (03-24-18 @ 20:46)  HR: 86 (03-25-18 @ 11:54)  BP: 99/68 (03-25-18 @ 11:54)  RR: 18 (03-25-18 @ 11:54)  SpO2: 98% (03-25-18 @ 11:54)  Wt(kg): --    PHYSICAL EXAM:  General: alert, no acute distress  Eyes:  anicteric, no conjunctival injection, no discharge  Oropharynx: no lesions or injection 	  Neck: supple, without adenopathy  Lungs: clear to auscultation  Heart: regular rate and rhythm; no murmur, rubs or gallops  Abdomen: soft, nondistended, nontender, without mass or organomegaly  Skin: no lesions  Extremities: R LE immobilizer, dressings, drain  Neurologic: alert, oriented, moves all extremities    LAB RESULTS:                        6.4    6.6   )-----------( 125      ( 25 Mar 2018 11:20 )             19.6     03-25    138  |  105  |  26<H>  ----------------------------<  98  4.4   |  20<L>  |  1.91<H>    Ca    8.0<L>      25 Mar 2018 07:12    Vancomycin Level, Trough (03.25.18 @ 10:01)    Vancomycin Level, Trough: 14.9    MICROBIOLOGY:  RECENT CULTURES:  03-24 @ 05:48 .Blood Blood     No growth to date.      03-24 @ 03:55 .Body Fluid Synovial Fluid-- rt knee     Rare Staphylococcus aureus    Few polymorphonuclear leukocytes seen per low power field  No organisms seen    03-24 @ 03:53 .Tissue Other, synovium #1     Rare Staphylococcus aureus    Few polymorphonuclear leukocytes seen per low power field  No organisms seen    03-24 @ 03:51 .Tissue Other, femoral canal     Rare Staphylococcus species    Numerous polymorphonuclear leukocytes seen per low power field  No organisms seen    03-24 @ 03:41 .Tissue Other, synovium #2     No growth    Few polymorphonuclear leukocytes seen per low power field  No organisms seen    03-24 @ 03:40 .Tissue Other, tibial canal     No growth    No polymorphonuclear cells seen per low power field  No organisms seen per oil power field        RADIOLOGY REVIEWED:

## 2018-03-25 NOTE — DIETITIAN INITIAL EVALUATION ADULT. - OTHER INFO
Nutrition Consult for diet education received and appreciated. Pt S/P right Total Knee spacer exchange on 3/23/18. Pt with extensive PMH including HLD, GERD, Anxiety, Anemia, CAD S/P HERBERT, severe AS (S/P TAVR & PPM 12/17); h/o MVR, PMR on chronic steroids, asthma, OA, S/P TKR with recent joint infection. Pt noted with stable volume status per cardiology.

## 2018-03-25 NOTE — CHART NOTE - NSCHARTNOTEFT_GEN_A_CORE
Ortho PA Note      As per Dr. Bass, vancomycin discontinued pending Vanco trough and renal labs ordered.   Renal consult called and pending.   MAGUI Banda- on 6 [place] aware of STAT labs ordered.       KAYLA Butt  Orthopedic Surgery  [phone]

## 2018-03-25 NOTE — PROVIDER CONTACT NOTE (CRITICAL VALUE NOTIFICATION) - TEST AND RESULT REPORTED:
culture 1 on 3/23-other femoral canal has rare staph species. 2nd culture on 3/23 - other synovial number has rare staph aurus. 3rd culture on 3/23 of right knee synovial fluid rare staph aurus

## 2018-03-25 NOTE — CONSULT NOTE ADULT - ASSESSMENT
77 yr female PMH HTN HLD CAD s/p PCI Severe AV s/p TAVR SSS s/p PPM Polymyalgia Anxiety GERD chronic back pain  presents for knee revision of infected joint.  consult called for post op medical management.      Infected Knee - c/w vanco / zosyn as per ID  f/u cultures      Wide complex tachycardia - sustain EP consult called for evaluation.  interrogate PPM    YONATAN cr trending up nephro consult called pending     Blood loss anemia ?  - hb down stat repeat ordered if below 7 transfuse ortho f/u to assess joint.   If bleeding Vitamin K stat discussed w/ NP stool occult ordered    HTN HLD CAD s/p TAVR SSS s/p ppm - c/w current med cardio f/u pending     DVT on coumadin INR supratherpautic hold comadin.      Anxiety - counseled extensively feeling better discussed w/ aid bedside    Polymyalgia - steroids pain control PT as tolerated     Asthma - respiratory status stable duonebs prn     DVT PPX    Consider transfer to medical C as multiple active issues

## 2018-03-25 NOTE — PROGRESS NOTE ADULT - SUBJECTIVE AND OBJECTIVE BOX
Cardiology/Vascular Medicine Inpatient Progress Note    POD #2 rt total knee spacer exchange  Patient with known history of LBBB, SSS s/p PPM  Telemetry:     Vital Signs Last 24 Hrs  T(C): 36.9 (25 Mar 2018 05:44), Max: 37.1 (24 Mar 2018 20:46)  T(F): 98.5 (25 Mar 2018 05:44), Max: 98.7 (24 Mar 2018 20:46)  HR: 84 (25 Mar 2018 05:44) (84 - 98)  BP: 105/58 (25 Mar 2018 05:44) (91/59 - 106/65)  BP(mean): --  RR: 18 (25 Mar 2018 05:44) (18 - 18)  SpO2: 100% (25 Mar 2018 05:44) (97% - 100%)    Appearance: NAD, chronically ill appearing, obese  HEENT:   Normal oral mucosa, PERRL, EOMI	  Lymphatic: No lymphadenopathy  Cardiovascular: Normal S1 S2, No JVD, + LE edema  Respiratory: Decreased BS b/l bases	  Psychiatry: Awake, alert  Gastrointestinal:  Soft, Non-tender, + BS	  Skin: No rashes, No ecchymoses, No cyanosis	  Neurologic: Non-focal  Extremities: +Rt knee brace, drainage in place  Vascular: Peripheral pulses palpable 2+ bilaterally    MEDICATIONS  (STANDING):  acetaminophen   Tablet. 975 milliGRAM(s) Oral every 8 hours  acetaminophen   Tablet. 1000 milliGRAM(s) Oral once  ascorbic acid 500 milliGRAM(s) Oral two times a day  buDESOnide 160 MICROgram(s)/formoterol 4.5 MICROgram(s) Inhaler 2 Puff(s) Inhalation two times a day  calcium carbonate 1250 mG + Vitamin D (OsCal 500 + D) 1 Tablet(s) Oral three times a day  ceFAZolin   IVPB 2000 milliGRAM(s) IV Intermittent once  celecoxib 100 milliGRAM(s) Oral two times a day  cyclobenzaprine 5 milliGRAM(s) Oral two times a day  docusate sodium 100 milliGRAM(s) Oral three times a day  enoxaparin Injectable 100 milliGRAM(s) SubCutaneous every 12 hours  ferrous    sulfate 325 milliGRAM(s) Oral three times a day with meals  folic acid 1 milliGRAM(s) Oral daily  ketorolac   Injectable 15 milliGRAM(s) IV Push once  multivitamin 1 Tablet(s) Oral daily  pantoprazole    Tablet 40 milliGRAM(s) Oral daily  piperacillin/tazobactam IVPB. 3.375 Gram(s) IV Intermittent every 8 hours  polyethylene glycol 3350 17 Gram(s) Oral daily  simvastatin 20 milliGRAM(s) Oral at bedtime  sodium chloride 0.9% lock flush 3 milliLiter(s) IV Push every 8 hours  sodium chloride 0.9%. 1000 milliLiter(s) (160 mL/Hr) IV Continuous <Continuous>  vancomycin  IVPB 1750 milliGRAM(s) IV Intermittent daily    MEDICATIONS  (PRN):  acetaminophen   Tablet 650 milliGRAM(s) Oral every 6 hours PRN For Temp greater than 38 C (100.4 F)  ALPRAZolam 0.25 milliGRAM(s) Oral daily PRN anxiety  aluminum hydroxide/magnesium hydroxide/simethicone Suspension 30 milliLiter(s) Oral four times a day PRN Indigestion  benzocaine 15 mG/menthol 3.6 mG Lozenge 1 Lozenge Oral every 4 hours PRN Sore Throat  bisacodyl Suppository 10 milliGRAM(s) Rectal daily PRN If no bowel movement by postoperative day #2  diphenhydrAMINE   Capsule 25 milliGRAM(s) Oral at bedtime PRN Insomnia  diphenhydrAMINE   Capsule 25 milliGRAM(s) Oral every 4 hours PRN Rash and/or Itching  magnesium hydroxide Suspension 30 milliLiter(s) Oral daily PRN Constipation  ondansetron Injectable 4 milliGRAM(s) IV Push every 6 hours PRN Nausea and/or Vomiting  oxyCODONE    IR 5 milliGRAM(s) Oral every 4 hours PRN Moderate Pain (4 - 6)  oxyCODONE    IR 10 milliGRAM(s) Oral every 4 hours PRN Severe Pain (7 - 10)  prochlorperazine   Injectable 5 milliGRAM(s) IV Push once PRN Nausea and/or Vomiting  senna 2 Tablet(s) Oral at bedtime PRN Constipation  traMADol 50 milliGRAM(s) Oral every 6 hours PRN Mild Pain (1 - 3)  zolpidem 5 milliGRAM(s) Oral at bedtime PRN Insomnia      LABS:	 	                          9.4    9.8   )-----------( 162      ( 24 Mar 2018 07:12 )             28.8   03-24    141  |  105  |  19  ----------------------------<  128<H>  5.1   |  22  |  1.32<H>    Ca    9.0      24 Mar 2018 07:12    PT/INR - ( 24 Mar 2018 11:12 )   PT: 22.4 sec;   INR: 2.03 ratio    PTT - ( 24 Mar 2018 11:12 )  PTT:33.1 sec    < from: Limited Transthoracic Echo (12.27.17 @ 14:06) >    Patient name: IRASEMA KOHLER  YOB: 1940   Age: 77 (F)   MR#: [id number]  Study Date: 12/27/2017  Location: [id number]: Lauren Finkelstein  Study quality: Limited  Referring Physician: Sammy Galvan MD / Garo Sorto MD / Roverto Roblero MD  Blood Pressure: 139/52 mmHg  Height: 157 cm  Weight: 131 kg  BSA: 2.2 m2  ------------------------------------------------------------------------  PROCEDURE: Limited transthoracic echocardiogram with 2-D.  M-Mode and spectral and color flow Doppler.  INDICATION: Endocarditis, valve unspecified (I38)  ------------------------------------------------------------------------  OBSERVATIONS:  Mitral Valve: Mitral annular calcification and calcified  mitral leaflets with decreased diastolic opening. Peak  mitral valve gradient equals 14 mm Hg, mean transmitral  valve gradient equals 6 mm Hg, consistent with moderate  mitral stenosis.  Aortic Root: Normal aortic root, aortic arch and descending  thoracic aorta.  Aortic Valve:Bioprosthetic aortic valve replacement. s/p  TAVR.  Left Atrium: Normal left atrium.  Left Ventricle: Normal left ventricular systolic function.  No segmental wall motion abnormalities. Normal left  ventricular internal dimensions and wall thicknesses.  Right Heart: Normal right atrium. Normal right ventricular  size and function. Normal tricuspid valve. Normal pulmonic  valve.  Pericardium/PleuraNormal pericardium with no pericardial  effusion.  ------------------------------------------------------------------------  CONCLUSIONS:  1. Mitral annular calcification and calcified mitral  leaflets with decreased diastolic opening. Peak mitral  valve gradient equals 14 mm Hg, mean transmitral valve  gradient equals 6 mm Hg, consistent with moderate mitral  stenosis.  2. Bioprosthetic aortic valve replacement. s/p TAVR.  3. Normal left ventricular systolic function. No segmental  wall motion abnormalities.  *** Compared with echocardiogram of 12/19/2017, no  significant changes noted. There isno obvious evidence of  endocarditis.  ------------------------------------------------------------------------  Confirmed on  12/27/2017 - 17:13:04 by Filipe Esparza M.D.  ------------------------------------------------------------------------    < end of copied text >      < from: Xray Knee 1 or 2 Views, Right (03.23.18 @ 18:26) >    EXAM:  KNEE; 1 OR 2 VIEWS - RIGHT                            PROCEDURE DATE:  03/23/2018            INTERPRETATION:  INDICATION: post-op. Pain    COMPARISON: Knee radiographs dated 1/25/2018    FINDING: Two views of the knee demonstrates interval placement of an   intramedullary ruthann bridging the distal femur and proximal tibia. There is   cement material within the knee joint space. The findings are compatible   with arthrodesis and cement spacer placement. There are surgical clips,   soft tissue swelling and draining keeping with recent surgery.    IMPRESSION:   Interval postoperative changes of the knee with cement spacer and fusion   ruthann in place.        AARTI DIEGO M.D., ATTENDING RADIOLOGIST  This document has been electronically signed. Mar 24 2018 12:15AM        < end of copied text > Cardiology/Vascular Medicine Inpatient Progress Note    POD #2 rt total knee spacer exchange  Patient with known history of LBBB, SSS s/p PPM  Telemetry: SR, AIVR noted, telemetry reviewed with EP  Recommend observing on telemetry today    Vital Signs Last 24 Hrs  T(C): 36.9 (25 Mar 2018 05:44), Max: 37.1 (24 Mar 2018 20:46)  T(F): 98.5 (25 Mar 2018 05:44), Max: 98.7 (24 Mar 2018 20:46)  HR: 84 (25 Mar 2018 05:44) (84 - 98)  BP: 105/58 (25 Mar 2018 05:44) (91/59 - 106/65)  BP(mean): --  RR: 18 (25 Mar 2018 05:44) (18 - 18)  SpO2: 100% (25 Mar 2018 05:44) (97% - 100%)    Appearance: NAD, chronically ill appearing, obese  HEENT:   Normal oral mucosa, PERRL, EOMI	  Lymphatic: No lymphadenopathy  Cardiovascular: Normal S1 S2, No JVD, + LE edema  Respiratory: Decreased BS b/l bases	  Psychiatry: Awake, alert  Gastrointestinal:  Soft, Non-tender, + BS	  Skin: No rashes, No ecchymoses, No cyanosis	  Neurologic: Non-focal  Extremities: +Rt knee brace, drainage in place  Vascular: Peripheral pulses palpable 2+ bilaterally    MEDICATIONS  (STANDING):  acetaminophen   Tablet. 975 milliGRAM(s) Oral every 8 hours  acetaminophen   Tablet. 1000 milliGRAM(s) Oral once  ascorbic acid 500 milliGRAM(s) Oral two times a day  buDESOnide 160 MICROgram(s)/formoterol 4.5 MICROgram(s) Inhaler 2 Puff(s) Inhalation two times a day  calcium carbonate 1250 mG + Vitamin D (OsCal 500 + D) 1 Tablet(s) Oral three times a day  ceFAZolin   IVPB 2000 milliGRAM(s) IV Intermittent once  celecoxib 100 milliGRAM(s) Oral two times a day  cyclobenzaprine 5 milliGRAM(s) Oral two times a day  docusate sodium 100 milliGRAM(s) Oral three times a day  enoxaparin Injectable 100 milliGRAM(s) SubCutaneous every 12 hours  ferrous    sulfate 325 milliGRAM(s) Oral three times a day with meals  folic acid 1 milliGRAM(s) Oral daily  ketorolac   Injectable 15 milliGRAM(s) IV Push once  multivitamin 1 Tablet(s) Oral daily  pantoprazole    Tablet 40 milliGRAM(s) Oral daily  piperacillin/tazobactam IVPB. 3.375 Gram(s) IV Intermittent every 8 hours  polyethylene glycol 3350 17 Gram(s) Oral daily  simvastatin 20 milliGRAM(s) Oral at bedtime  sodium chloride 0.9% lock flush 3 milliLiter(s) IV Push every 8 hours  sodium chloride 0.9%. 1000 milliLiter(s) (160 mL/Hr) IV Continuous <Continuous>  vancomycin  IVPB 1750 milliGRAM(s) IV Intermittent daily    MEDICATIONS  (PRN):  acetaminophen   Tablet 650 milliGRAM(s) Oral every 6 hours PRN For Temp greater than 38 C (100.4 F)  ALPRAZolam 0.25 milliGRAM(s) Oral daily PRN anxiety  aluminum hydroxide/magnesium hydroxide/simethicone Suspension 30 milliLiter(s) Oral four times a day PRN Indigestion  benzocaine 15 mG/menthol 3.6 mG Lozenge 1 Lozenge Oral every 4 hours PRN Sore Throat  bisacodyl Suppository 10 milliGRAM(s) Rectal daily PRN If no bowel movement by postoperative day #2  diphenhydrAMINE   Capsule 25 milliGRAM(s) Oral at bedtime PRN Insomnia  diphenhydrAMINE   Capsule 25 milliGRAM(s) Oral every 4 hours PRN Rash and/or Itching  magnesium hydroxide Suspension 30 milliLiter(s) Oral daily PRN Constipation  ondansetron Injectable 4 milliGRAM(s) IV Push every 6 hours PRN Nausea and/or Vomiting  oxyCODONE    IR 5 milliGRAM(s) Oral every 4 hours PRN Moderate Pain (4 - 6)  oxyCODONE    IR 10 milliGRAM(s) Oral every 4 hours PRN Severe Pain (7 - 10)  prochlorperazine   Injectable 5 milliGRAM(s) IV Push once PRN Nausea and/or Vomiting  senna 2 Tablet(s) Oral at bedtime PRN Constipation  traMADol 50 milliGRAM(s) Oral every 6 hours PRN Mild Pain (1 - 3)  zolpidem 5 milliGRAM(s) Oral at bedtime PRN Insomnia      LABS:	 	                          9.4    9.8   )-----------( 162      ( 24 Mar 2018 07:12 )             28.8   03-24    141  |  105  |  19  ----------------------------<  128<H>  5.1   |  22  |  1.32<H>    Ca    9.0      24 Mar 2018 07:12    PT/INR - ( 24 Mar 2018 11:12 )   PT: 22.4 sec;   INR: 2.03 ratio    PTT - ( 24 Mar 2018 11:12 )  PTT:33.1 sec    < from: Limited Transthoracic Echo (12.27.17 @ 14:06) >    Patient name: IRASEMA KOHLER  YOB: 1940   Age: 77 (F)   MR#: [id number]  Study Date: 12/27/2017  Location: [id number]: Lauren Finkelstein  Study quality: Limited  Referring Physician: Sammy Galvan MD / Garo Sorto MD / Roverto Roblero MD  Blood Pressure: 139/52 mmHg  Height: 157 cm  Weight: 131 kg  BSA: 2.2 m2  ------------------------------------------------------------------------  PROCEDURE: Limited transthoracic echocardiogram with 2-D.  M-Mode and spectral and color flow Doppler.  INDICATION: Endocarditis, valve unspecified (I38)  ------------------------------------------------------------------------  OBSERVATIONS:  Mitral Valve: Mitral annular calcification and calcified  mitral leaflets with decreased diastolic opening. Peak  mitral valve gradient equals 14 mm Hg, mean transmitral  valve gradient equals 6 mm Hg, consistent with moderate  mitral stenosis.  Aortic Root: Normal aortic root, aortic arch and descending  thoracic aorta.  Aortic Valve:Bioprosthetic aortic valve replacement. s/p  TAVR.  Left Atrium: Normal left atrium.  Left Ventricle: Normal left ventricular systolic function.  No segmental wall motion abnormalities. Normal left  ventricular internal dimensions and wall thicknesses.  Right Heart: Normal right atrium. Normal right ventricular  size and function. Normal tricuspid valve. Normal pulmonic  valve.  Pericardium/PleuraNormal pericardium with no pericardial  effusion.  ------------------------------------------------------------------------  CONCLUSIONS:  1. Mitral annular calcification and calcified mitral  leaflets with decreased diastolic opening. Peak mitral  valve gradient equals 14 mm Hg, mean transmitral valve  gradient equals 6 mm Hg, consistent with moderate mitral  stenosis.  2. Bioprosthetic aortic valve replacement. s/p TAVR.  3. Normal left ventricular systolic function. No segmental  wall motion abnormalities.  *** Compared with echocardiogram of 12/19/2017, no  significant changes noted. There isno obvious evidence of  endocarditis.  ------------------------------------------------------------------------  Confirmed on  12/27/2017 - 17:13:04 by Filipe Esparza M.D.  ------------------------------------------------------------------------    < end of copied text >      < from: Xray Knee 1 or 2 Views, Right (03.23.18 @ 18:26) >    EXAM:  KNEE; 1 OR 2 VIEWS - RIGHT                            PROCEDURE DATE:  03/23/2018            INTERPRETATION:  INDICATION: post-op. Pain    COMPARISON: Knee radiographs dated 1/25/2018    FINDING: Two views of the knee demonstrates interval placement of an   intramedullary ruthann bridging the distal femur and proximal tibia. There is   cement material within the knee joint space. The findings are compatible   with arthrodesis and cement spacer placement. There are surgical clips,   soft tissue swelling and draining keeping with recent surgery.    IMPRESSION:   Interval postoperative changes of the knee with cement spacer and fusion   ruthann in place.        AARTI DIEGO M.D., ATTENDING RADIOLOGIST  This document has been electronically signed. Mar 24 2018 12:15AM        < end of copied text >

## 2018-03-25 NOTE — DIETITIAN INITIAL EVALUATION ADULT. - NS FNS REASON FOR WEIGHT CHANG
decreased po intake/Per prior admits, pt weighed 273 pounds in Dec 2017 and 265 pounds in January 2018.

## 2018-03-25 NOTE — CHART NOTE - NSCHARTNOTEFT_GEN_A_CORE
Medicine NP Episodic Note    HPI: 78 y/o F w/ PMHx of HTN, HLD, CAD s/p PCI, Severe AV s/p TAVR, SSS s/p PPM, Polymyalgia, Anxiety, GERD, chronic back pain presents for knee revision of infected joint, s/p R knee spacer exchange and I&D and complex wound closure.  Today, pt's H/H 6.4/19.6; INR 3.92 for which pt. received 1 unit PRBC transfusion and Vitamin K 5 mg PO x1.    Event Summary: Labs reviewed: post transfusion H/H 7.4/22; INR 3.93 s/p Vitamin K 5mg PO x 1.  Pt. seen and examined at bedside, A+Ox3, in NAD.  Hemodynamically stable.  On exam does not appear to be fluid overloaded.  Denies any specific complaints.    # Anemia   > Will transfuse additional unit of PRBC  > F/u post transfusion CBC  > Monitor vital signs  > F/u stool OB  > Monitor for s/s of bleeding     # Supratherapeutic INR  > Will give additional dose of Vitamin K 2.5 mg PO x 1  > F/u am labs: PT/INR  > Bleeding precaution    Discussed with Dr. Marrero (covering Dr. Singh) who agrees with aforementioned plan.  Will endorse to primary team in am.  Attending to follow.    Tahmina Tadeo, SENDY-BC  [phone]

## 2018-03-25 NOTE — DIETITIAN INITIAL EVALUATION ADULT. - NS AS NUTRI INTERV ED CONTENT
Reviewed Heart Healthy diet and provided handouts on Heart Healthy Nutrition Therapy and USDA My Plate recommendations for balanced eating./Nutrition relationship to health/disease

## 2018-03-25 NOTE — PROGRESS NOTE ADULT - ASSESSMENT
POD #2    Clinically stable from the cardiac perspective.  Stable volume status.  Resume low dose beta blocker when able/ if BP tolerates ( ie metoprolol 12.5 BID)  Tolerating enoxaparin at this time.  Transition to warfarin when hemostasis assured/no further procedures planned.  On IV abx as per primary team.    Patient has history of LBBB  Telemetry:     Will follow.    Roverto Roblero  Pager [phone] POD #2    Clinically stable from the cardiac perspective.  Stable volume status.  Resume low dose beta blocker when able/ if BP tolerates ( ie metoprolol 12.5 BID)  Tolerating enoxaparin at this time.  Transition to warfarin when hemostasis assured/no further procedures planned.  On IV abx as per primary team.    Patient has history of LBBB  Telemetry: SR, AIVR  Monitor on telemetry today  Recommendations per EP.    Will follow.    Roverto Roblero  Pager [phone]

## 2018-03-25 NOTE — CHART NOTE - NSCHARTNOTEFT_GEN_A_CORE
Medicine NP(s 27187)  Per Dr. Singh:  Rpt Hb 6.4; one unit packed cells ordered  INR 3.9; Vitamin K 5mg oral given  c/w N/S IVF; decrease rate to 100ml/hr x 10 hrs  CBC Q 6hrs; rpt INR with next CBC

## 2018-03-25 NOTE — CONSULT NOTE ADULT - SUBJECTIVE AND OBJECTIVE BOX
[facility] Consult Note Nephrology - CONSULTATION NOTE    78 yo woman s/p Right Total Knee spacer exchange on 3/23/18.     She has HLD, GERD, Anxiety, Anemia, CAD s/p HERBERT, severe AS (s/p TAVR & PPM 12/17 [employer] Model [id number] last interrogated 11/30/17, appointment on 3/22/18 to check PPM as per Cardiology office), h/o MVR, PMR on chronic steroids, asthma, OA, s/p TKR with recent joint infection s/p explant and abx spacer on 12/23/17, pt sent to rehab and was receiving DVT prophylaxis. Pt developed nosebleed post operatively and prophylaxis was D/C'd, pt then developed RLE DVT and is now on Coumadin.     Tolerated procedure well, received 2u pRBC, hemodynamically stable, pain appears to be controlled.  Patient remains anxious about prognosis.    Renal now consulted for Mable on CKD3 with baseline cr appearing around 0.8mg/dl to 1mg/dl. Cr has been rising, now is 1.93mg/dl- Pt currently has  a indwelling lund with minimal urineoutput- post op pt with hypotension, arrythmias, severe anemia now receiving 1 units prbcs, s/p toradol .  Currently denies any n/v/d/c/sob/cp    Telemetry: SR with PVCs, NSVT    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  Artificial cardiac pacemaker: 12/25/17  H/O cardiac catheterization: 11/29/16 HERBERT placed on LAD  H/O endoscopy: with dilatation of esophageal stricture 2013  S/P cataract surgery: x2; 3-4yr ago  S/P gastroplasty: 28 yrs ago  S/P cholecystectomy: more than 15 years ago  S/P total knee replacement: left, 15yr ago  S/P total knee replacement: right, 12yr ago  S/P hysterectomy: 24yr ago    amoxicillin (Other)  IV Contrast (Flushing (Skin); Nausea)    Home Medications Reviewed  Hospital Medications:   MEDICATIONS  (STANDING):  acetaminophen   Tablet. 975 milliGRAM(s) Oral every 8 hours  acetaminophen   Tablet. 1000 milliGRAM(s) Oral once  ascorbic acid 500 milliGRAM(s) Oral two times a day  buDESOnide 160 MICROgram(s)/formoterol 4.5 MICROgram(s) Inhaler 2 Puff(s) Inhalation two times a day  calcium carbonate 1250 mG + Vitamin D (OsCal 500 + D) 1 Tablet(s) Oral three times a day  cyclobenzaprine 5 milliGRAM(s) Oral two times a day  docusate sodium 100 milliGRAM(s) Oral three times a day  ferrous    sulfate 325 milliGRAM(s) Oral three times a day with meals  folic acid 1 milliGRAM(s) Oral daily  multivitamin 1 Tablet(s) Oral daily  pantoprazole    Tablet 40 milliGRAM(s) Oral daily  polyethylene glycol 3350 17 Gram(s) Oral daily  simvastatin 20 milliGRAM(s) Oral at bedtime  sodium chloride 0.9% lock flush 3 milliLiter(s) IV Push every 8 hours  sodium chloride 0.9%. 1000 milliLiter(s) (100 mL/Hr) IV Continuous <Continuous>  vancomycin  IVPB 1000 milliGRAM(s) IV Intermittent every 24 hours    SOCIAL HISTORY:  Denies ETOh,Smoking,   FAMILY HISTORY:  No pertinent family history in first degree relatives    REVIEW OF SYSTEMS:  CONSTITUTIONAL: No weakness, fevers or chills  EYES/ENT: No visual changes;  No vertigo or throat pain   NECK: No pain or stiffness  RESPIRATORY: No cough, wheezing, hemoptysis; No shortness of breath  CARDIOVASCULAR: No chest pain or palpitations.  GASTROINTESTINAL: No abdominal or epigastric pain. No nausea, vomiting, or hematemesis; No diarrhea or constipation. No melena or hematochezia.  GENITOURINARY: No dysuria, frequency, foamy urine, urinary urgency, incontinence or hematuria  NEUROLOGICAL: No numbness or weakness  SKIN: No itching, burning, rashes, or lesions   VASCULAR: No bilateral lower extremity edema.   All other review of systems is negative unless indicated above.    VITALS:  T(F): 97.9 (03-25-18 @ 11:54), Max: 98.7 (03-24-18 @ 20:46)  HR: 86 (03-25-18 @ 11:54)  BP: 99/68 (03-25-18 @ 11:54)  RR: 18 (03-25-18 @ 11:54)  SpO2: 98% (03-25-18 @ 11:54)  Wt(kg): --    03-24 @ 07:01  -  03-25 @ 07:00  --------------------------------------------------------  IN: 4120 mL / OUT: 840 mL / NET: 3280 mL    03-25 @ 07:01  -  03-25 @ 15:46  --------------------------------------------------------  IN: 600 mL / OUT: 200 mL / NET: 400 mL        PHYSICAL EXAM:  Constitutional: NAD; pale  HEENT: anicteric sclera, oropharynx clear, MMM  Neck: No JVD  Respiratory:b/l  rhonchi  Cardiovascular: S1, S2, RRR  Gastrointestinal: BS+, soft, NT/ND  Extremities: 4+edema; right knee immobilizer  Neurological: A/O x 3, no focal deficits  Psychiatric: Normal mood, normal affect  : +indwelling lund.   Skin: No rashes    LABS:  03-25    138  |  105  |  26<H>  ----------------------------<  98  4.4   |  20<L>  |  1.91<H>    Ca    8.0<L>      25 Mar 2018 07:12      Creatinine Trend: 1.91 <--, 1.32 <--, 0.84 <--                        6.4    6.6   )-----------( 125      ( 25 Mar 2018 11:20 )             19.6     Urine Studies:    Creatinine, Random Urine: 198 mg/dL (03-25 @ 10:17)  Osmolality, Random Urine: 355 mos/kg (03-25 @ 10:17)  Chloride, Random Urine: <35 mmol/L (03-25 @ 10:17)  Potassium, Random Urine: 55 mmol/L (03-25 @ 10:17)  Sodium, Random Urine: 28 mmol/L (03-25 @ 10:17)    RADIOLOGY & ADDITIONAL STUDIES:

## 2018-03-25 NOTE — DIETITIAN INITIAL EVALUATION ADULT. - DIET TYPE
Nepro (425Kcal, 19gm pro per serving) tid/supplement (specify)/consistent carbohydrate (evening snack)

## 2018-03-25 NOTE — PROGRESS NOTE ADULT - ATTENDING COMMENTS
(note below is preliminary until this message is removed)    I agree with the above note and have personally seen and examined this patient. All pertinent films have been reviewed. Please refer to clinical documentation of the history, physical examinations, data summary, and both assessment and plan as documented above and with which I agree.    patient is s/p R knee spacer exchange and I&D and complex wound closure. she has kasia postop as well as hypotension and supratherapeutic inr. wound cultures positive in knee for staph aureus, further speciation pending    plan  1. hospitalist- pending request to transfer to [facility] hospitalist team for assistance in managing this medically complex patient. INR 3.91 today, will follow their recs and will hold coumadin tonight. lovenox d/c as inr supratherapeutic.  2. cards - Dr. Roblero following, he spoke with EP who stated that abnormal tele results may be normal 2/2 ventricular paced ppm. will monitor on tele for another 24 hours. pending further recs  3. renal - patient has kasia (cr 1.91) in setting of high dose antibiotic spacer, iv vanc, toradol, and lovenox. holding nephrotoxic meds. ordered renal panel and kidney u/s. cont ivf and lund, pending consult  4. id- Dr. Cannon recs stopping zosyn, follow up vanc trough (random). desired level 15-20. if random level <20 will give 1/2 dose vancomycin. cultures in joint now positive for staph aureus pending further speciation for mssa versus mrsa. will likely need picc but pending his further recs tomorrow. bcx ngtd x24 hours  5. hematology - pending consult, postop anemia 6.4/20. secondary to acute blood loss anemia and likely postop bleeding from supratherapeutic inr. pending recs on safely reversing anticoagulation. will transfuse 2u prbc  6. [facility] consult - pending recs on whether transfer to [facility] would be appropriate in setting of this medically complex patient   7. pain control with po meds, holding nsaids, cont tylenol and tramadol/oxycodone prn  8. pt/ot - on hold for now given active medical issues    Nate Bass MD  Attending Orthopedic Surgeon (note below is preliminary until this message is removed)    I agree with the above note and have personally seen and examined this patient. All pertinent films have been reviewed. Please refer to clinical documentation of the history, physical examinations, data summary, and both assessment and plan as documented above and with which I agree.    patient is s/p R knee spacer exchange and I&D and complex wound closure. she has kasia postop as well as hypotension and supratherapeutic inr. wound cultures positive in knee for staph aureus, further speciation pending    plan  1. hospitalist- transfer to [facility] hospitalist team for assistance in managing this medically complex patient. INR 3.91 today, will follow their recs and will hold coumadin tonight. lovenox d/c as inr supratherapeutic.  2. cards - Dr. Roblero following, he spoke with EP who stated that abnormal tele results may be normal 2/2 ventricular paced ppm. will monitor on tele for another 24 hours. pending further recs  3. renal - patient has kasia (cr 1.91) in setting of high dose antibiotic spacer, iv vanc, toradol, and lovenox. holding nephrotoxic meds. ordered renal panel and kidney u/s. cont ivf and lund, pending consult  4. id- Dr. Cannon recs stopping zosyn, follow up vanc trough (random). desired level 15-20. if random level <20 will give 1/2 dose vancomycin. cultures in joint now positive for staph aureus pending further speciation for mssa versus mrsa. will likely need picc but pending his further recs tomorrow. bcx ngtd x24 hours  5. hematology - pending consult, postop anemia 6.4/20. secondary to acute blood loss anemia and likely postop bleeding from supratherapeutic inr, this may also be secondary to positive fluid volume status with dilution. pending recs on safely reversing anticoagulation in setting of active dvt (filter in place) and tavr. will transfuse 2u prbc and recheck cbc before transfusion  6. pain control with po meds, holding nsaids, cont tylenol and tramadol/oxycodone prn  7. pt/ot - if stable okay for oob to beside chair    Nate Bass MD  Attending Orthopedic Surgeon (note below is preliminary until this message is removed)    I agree with the above note and have personally seen and examined this patient. All pertinent films have been reviewed. Please refer to clinical documentation of the history, physical examinations, data summary, and both assessment and plan as documented above and with which I agree.    patient is s/p R knee spacer exchange and I&D and complex wound closure. she has kasia postop as well as hypotension and supratherapeutic inr. wound cultures positive in knee for staph aureus, further speciation pending    plan  1. hospitalist- transfer to [facility] hospitalist team for assistance in managing this medically complex patient. INR 3.91 today, will follow their recs and will hold coumadin tonight. lovenox d/c as inr supratherapeutic. per hospitalist md he would like to follow postop anemia 6.4/20 with repeat cbc. this is secondary to acute blood loss anemia and likely postop bleeding from supratherapeutic inr, this may also be secondary to positive fluid volume status with dilution. may need to reverse anticoagulation in setting of active dvt (filter in place) and tavr with vitamin K. hospitalist team to consider transfusing 2u prbc, consider heme consult for guidance  2. cards - Dr. Roblero following, he spoke with EP who stated that abnormal tele results may be normal 2/2 ventricular paced ppm. will monitor on tele for another 24 hours. pending further recs  3. renal - patient has kasia (cr 1.91) in setting of high dose antibiotic spacer, iv vanc, toradol, and lovenox. holding nephrotoxic meds. ordered renal panel and kidney u/s. cont ivf and ludn, pending consult  4. id- Dr. Cannon recs stopping zosyn, follow up vanc trough (random). desired level 15-20. if random level <20 will give 1/2 dose vancomycin. cultures in joint now positive for staph aureus pending further speciation for mssa versus mrsa. will likely need picc but pending his further recs tomorrow. bcx ngtd x24 hours  5. pain control with po meds, holding nsaids, cont tylenol and tramadol/oxycodone prn, avoid nsaids in setting of kasia  6. pt/ot - if stable okay for oob to beside chair    Nate Bass MD  Attending Orthopedic Surgeon I agree with the above note and have personally seen and examined this patient. All pertinent films have been reviewed. Please refer to clinical documentation of the history, physical examinations, data summary, and both assessment and plan as documented above and with which I agree.    patient is s/p R knee spacer exchange and I&D and complex wound closure. she has kasia postop as well as hypotension and supratherapeutic inr. wound cultures positive in knee for staph aureus, further speciation pending    plan  1. hospitalist- transfer to [facility] hospitalist team for assistance in managing this medically complex patient. INR 3.91 today, will follow their recs and will hold coumadin tonight. lovenox d/c as inr supratherapeutic. per hospitalist md he would like to follow postop anemia 6.4/20 with repeat cbc. this is secondary to acute blood loss anemia and likely postop bleeding from supratherapeutic inr, this may also be secondary to positive fluid volume status with dilution. may need to reverse anticoagulation in setting of active dvt (filter in place) and tavr with vitamin K. hospitalist team to consider transfusing 2u prbc, consider heme consult for guidance  2. cards - Dr. Roblero following, he spoke with EP who stated that abnormal tele results may be normal 2/2 ventricular paced ppm. will monitor on tele for another 24 hours. pending further recs  3. renal - patient has kasia (cr 1.91) in setting of high dose antibiotic spacer, iv vanc, toradol, and lovenox. holding nephrotoxic meds. ordered renal panel and kidney u/s. cont ivf and lund, pending consult  4. id- Dr. Cannon recs stopping zosyn, follow up vanc trough (random). desired level 15-20. if random level <20 will give 1/2 dose vancomycin. cultures in joint now positive for staph aureus pending further speciation for mssa versus mrsa. will likely need picc but pending his further recs tomorrow. bcx ngtd x24 hours  5. pain control with po meds, holding nsaids, cont tylenol and tramadol/oxycodone prn, avoid nsaids in setting of kasia  6. pt/ot - if stable okay for oob to beside chair    Nate Bass MD  Attending Orthopedic Surgeon

## 2018-03-25 NOTE — DIETITIAN INITIAL EVALUATION ADULT. - PERTINENT MEDS FT
IV NaCl, dulcolax, maalox, vit C, OsCal 500+D, colace, senna, iron sulfate, folic acid, MVI, magnesium hydroxide, zofran, oxycodone, protonix, miralax

## 2018-03-25 NOTE — CONSULT NOTE ADULT - SUBJECTIVE AND OBJECTIVE BOX
Date of Admission:    Patient is a 77y old  Female who presents with a chief complaint of "Here for surgery on my right leg" (09 Mar 2018 09:48)    HISTORY OF PRESENT ILLNESS:     76 yo woman w/PMHx HLD, GERD, Anxiety, Anemia, CAD s/p HERBERT mLAD, severe AS s/p TAVR & PPM 12/17 [employer], MVR, PMR on chronic steroids, asthma, OA w/ recent TKR c/b infection explant and Abx spacer now admitted for R. total knee spacer exchange on 3/23 EP consulted for arrhythmia.       Per chart review, she tolerated the procedure well receiving 2u pRBC.   Of note, per chart review her DVT PPx was held at rehab given epistaxis and then she developed an RLE DVT now on coumadin.       Allergies    amoxicillin (Other)    Intolerances  IV Contrast (Flushing (Skin); Nausea)  	  MEDICATIONS:    vancomycin  IVPB 1000 milliGRAM(s) IV Intermittent every 24 hours    buDESOnide 160 MICROgram(s)/formoterol 4.5 MICROgram(s) Inhaler 2 Puff(s) Inhalation two times a day    acetaminophen   Tablet 650 milliGRAM(s) Oral every 6 hours PRN  acetaminophen   Tablet. 975 milliGRAM(s) Oral every 8 hours  acetaminophen   Tablet. 1000 milliGRAM(s) Oral once  ALPRAZolam 0.25 milliGRAM(s) Oral daily PRN  cyclobenzaprine 5 milliGRAM(s) Oral two times a day  ondansetron Injectable 4 milliGRAM(s) IV Push every 6 hours PRN  oxyCODONE    IR 5 milliGRAM(s) Oral every 4 hours PRN  oxyCODONE    IR 10 milliGRAM(s) Oral every 4 hours PRN  prochlorperazine   Injectable 5 milliGRAM(s) IV Push once PRN  traMADol 50 milliGRAM(s) Oral every 6 hours PRN  zolpidem 5 milliGRAM(s) Oral at bedtime PRN    aluminum hydroxide/magnesium hydroxide/simethicone Suspension 30 milliLiter(s) Oral four times a day PRN  bisacodyl Suppository 10 milliGRAM(s) Rectal daily PRN  docusate sodium 100 milliGRAM(s) Oral three times a day  magnesium hydroxide Suspension 30 milliLiter(s) Oral daily PRN  pantoprazole    Tablet 40 milliGRAM(s) Oral daily  polyethylene glycol 3350 17 Gram(s) Oral daily  senna 2 Tablet(s) Oral at bedtime PRN    simvastatin 20 milliGRAM(s) Oral at bedtime    ascorbic acid 500 milliGRAM(s) Oral two times a day  benzocaine 15 mG/menthol 3.6 mG Lozenge 1 Lozenge Oral every 4 hours PRN  calcium carbonate 1250 mG + Vitamin D (OsCal 500 + D) 1 Tablet(s) Oral three times a day  ferrous    sulfate 325 milliGRAM(s) Oral three times a day with meals  folic acid 1 milliGRAM(s) Oral daily  multivitamin 1 Tablet(s) Oral daily  sodium chloride 0.9%. 1000 milliLiter(s) IV Continuous <Continuous>      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  Artificial cardiac pacemaker: 12/25/17  H/O cardiac catheterization: 11/29/16 HERBERT placed on LAD  H/O endoscopy: with dilatation of esophageal stricture 2013  S/P cataract surgery: x2; 3-4yr ago  S/P gastroplasty: 28 yrs ago  S/P cholecystectomy: more than 15 years ago  S/P total knee replacement: left, 15yr ago  S/P total knee replacement: right, 12yr ago  S/P hysterectomy: 24yr ago      FAMILY HISTORY:  No pertinent family history in first degree relatives      SOCIAL HISTORY:    Nonsmoker; Retired, lives with spouse    REVIEW OF SYSTEMS:    CONSTITUTIONAL: No weakness, fevers or chills  EYES/ENT: No visual changes;  No dysphagia  NECK: No pain or stiffness  RESPIRATORY: No cough, wheezing, hemoptysis; No shortness of breath  CARDIOVASCULAR: No chest pain or palpitations; No lower extremity edema  GASTROINTESTINAL: No abdominal or epigastric pain. No nausea, vomiting, or hematemesis; No diarrhea or constipation. No melena or hematochezia.  BACK: No back pain  GENITOURINARY: No dysuria, frequency or hematuria  NEUROLOGICAL: No numbness or weakness  SKIN: No itching, burning, rashes, or lesions   All other review of systems is negative unless indicated above.    PHYSICAL EXAM:  T(C): 36.6 (03-25-18 @ 11:54), Max: 37.1 (03-24-18 @ 20:46)  HR: 86 (03-25-18 @ 11:54) (84 - 95)  BP: 99/68 (03-25-18 @ 11:54) (90/42 - 105/58)  RR: 18 (03-25-18 @ 11:54) (18 - 18)  SpO2: 98% (03-25-18 @ 11:54) (98% - 100%)  Wt(kg): --  I&O's Summary    24 Mar 2018 07:01  -  25 Mar 2018 07:00  --------------------------------------------------------  IN: 4120 mL / OUT: 840 mL / NET: 3280 mL    25 Mar 2018 07:01  -  25 Mar 2018 13:38  --------------------------------------------------------  IN: 600 mL / OUT: 0 mL / NET: 600 mL        Appearance: Normal	  HEENT:   Normal oral mucosa, PERRL, EOMI	  Lymphatic: No lymphadenopathy  Cardiovascular: Normal S1 S2, No JVD, No murmurs, No edema  Respiratory: Lungs clear to auscultation	  Psychiatry: A & O x 3, Mood & affect appropriate  Gastrointestinal:  Soft, Non-tender, + BS	  Skin: No rashes, No ecchymoses, No cyanosis	  Neurologic: Non-focal  Extremities: Normal range of motion, No clubbing, cyanosis or edema  Vascular: Peripheral pulses palpable 2+ bilaterally        LABS:	 	    CBC Full  -  ( 25 Mar 2018 11:20 )  WBC Count : 6.6 K/uL  Hemoglobin : 6.4 g/dL  Hematocrit : 19.6 %  Platelet Count - Automated : 125 K/uL  Mean Cell Volume : 87.3 fl  Mean Cell Hemoglobin : 28.8 pg  Mean Cell Hemoglobin Concentration : 33.0 gm/dL    03-25    138  |  105  |  26<H>  ----------------------------<  98  4.4   |  20<L>  |  1.91<H>  03-24    141  |  105  |  19  ----------------------------<  128<H>  5.1   |  22  |  1.32<H>    Ca    8.0<L>      25 Mar 2018 07:12  Ca    9.0      24 Mar 2018 07:12    TELEMETRY: 	  SR 80-100s today, overnight intermittent AIVR for 2 to 10 minutes.     ECG:    	  RADIOLOGY:      PREVIOUS DIAGNOSTIC TESTING:    [ ] Echocardiogram: < from: Transthoracic Echocardiogram (01.25.18 @ 14:10) >  ------------------------------------------------------------------------  Dimensions:    Normal Values:  LA:     3.5    2.0 - 4.0 cm  Ao:     2.3    2.0 - 3.8 cm  SEPTUM: 0.7    0.6 - 1.2 cm  PWT:    0.7    0.6 - 1.1 cm  LVIDd:  4.7    3.0 - 5.6 cm  LVIDs:  3.2    1.8 - 4.0 cm  Derived variables:  LVMI: 47 g/m2  RWT: 0.29  Fractional short: 32 %  EF (Boston Rule): 65.9 %Doppler Peak Velocity (m/sec):  AoV=3.0    < end of copied text >  < from: Transthoracic Echocardiogram (01.25.18 @ 14:10) >  Conclusions:  1. Mitral annular calcification. Mild mitral regurgitation.    2. Transcatheter aortic valve replacement. The valve is  well seated.  Peak transaortic valve gradient equals 36 mm  Hg, mean transaortic valve gradient equals 19 mm Hg, which  is probably normal in the presence of a transcatheter  aortic valve replacement. No valvular aortic valve  regurgitation and minimal paravalvular aortic  regurgitation.  3. Severely dilated left atrium.  LA volume index = 50  cc/m2.  4. Normal left ventricular systolic function. The basal  inferolateral wall, and the basal inferior wall are mildly  hypokinetic.  5. Moderate diastolic dysfunction (Stage II).  6. Right ventricular enlargement with normal right  ventricular systolic function. A device wire is noted in  the right heart.  7. Tricuspid valve not well visualized. Moderate tricuspid  regurgitation.    [ ]  Catheterization: < from: Cardiac Cath Lab - Adult (11.29.16 @ 10:01) >  CORONARY VESSELS: The coronary circulation is right dominant.  LM:   --  LM: Normal.  LAD:   --  Mid LAD: There was a 90 % stenosis.  CX:   --  OM1: There was a 50 % stenosis.  RCA:   --  RCA: Normal.  COMPLICATIONS: There were no complications.  INTERVENTIONAL RECOMMENDATIONS: ASA and Plavix for 1 year.   --  Intervention on mid LAD: drug-eluting stent. Continue TAVR work up.     	  ASSESSMENT/PLAN: Date of Admission:    Patient is a 77y old  Female who presents with a chief complaint of "Here for surgery on my right leg" (09 Mar 2018 09:48)    HISTORY OF PRESENT ILLNESS:     76 yo woman w/PMHx HLD, GERD, Anxiety, Anemia, CAD s/p HERBERT mLAD, severe AS s/p TAVR & PPM 12/17 [employer], MVR, PMR on chronic steroids, asthma, OA w/ recent TKR c/b infection explant and Abx spacer now admitted for R. total knee spacer exchange on 3/23 EP consulted for arrhythmia noted on telemetry.     Per chart review, she tolerated the procedure well receiving 2u pRBC.   Of note, per chart review her DVT PPx was held at rehab given epistaxis and then she developed an RLE DVT now on coumadin.     She currently feels well denying any CP, SOB, nausea/vomiting, or lightheadedness. She reported occasional palpitations yesterday morning. Only complaint today is slight fatigue.     Allergies    amoxicillin (Other)    Intolerances  IV Contrast (Flushing (Skin); Nausea)  	  MEDICATIONS:    vancomycin  IVPB 1000 milliGRAM(s) IV Intermittent every 24 hours    buDESOnide 160 MICROgram(s)/formoterol 4.5 MICROgram(s) Inhaler 2 Puff(s) Inhalation two times a day    acetaminophen   Tablet 650 milliGRAM(s) Oral every 6 hours PRN  acetaminophen   Tablet. 975 milliGRAM(s) Oral every 8 hours  acetaminophen   Tablet. 1000 milliGRAM(s) Oral once  ALPRAZolam 0.25 milliGRAM(s) Oral daily PRN  cyclobenzaprine 5 milliGRAM(s) Oral two times a day  ondansetron Injectable 4 milliGRAM(s) IV Push every 6 hours PRN  oxyCODONE    IR 5 milliGRAM(s) Oral every 4 hours PRN  oxyCODONE    IR 10 milliGRAM(s) Oral every 4 hours PRN  prochlorperazine   Injectable 5 milliGRAM(s) IV Push once PRN  traMADol 50 milliGRAM(s) Oral every 6 hours PRN  zolpidem 5 milliGRAM(s) Oral at bedtime PRN    aluminum hydroxide/magnesium hydroxide/simethicone Suspension 30 milliLiter(s) Oral four times a day PRN  bisacodyl Suppository 10 milliGRAM(s) Rectal daily PRN  docusate sodium 100 milliGRAM(s) Oral three times a day  magnesium hydroxide Suspension 30 milliLiter(s) Oral daily PRN  pantoprazole    Tablet 40 milliGRAM(s) Oral daily  polyethylene glycol 3350 17 Gram(s) Oral daily  senna 2 Tablet(s) Oral at bedtime PRN    simvastatin 20 milliGRAM(s) Oral at bedtime    ascorbic acid 500 milliGRAM(s) Oral two times a day  benzocaine 15 mG/menthol 3.6 mG Lozenge 1 Lozenge Oral every 4 hours PRN  calcium carbonate 1250 mG + Vitamin D (OsCal 500 + D) 1 Tablet(s) Oral three times a day  ferrous    sulfate 325 milliGRAM(s) Oral three times a day with meals  folic acid 1 milliGRAM(s) Oral daily  multivitamin 1 Tablet(s) Oral daily  sodium chloride 0.9%. 1000 milliLiter(s) IV Continuous <Continuous>      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  Artificial cardiac pacemaker: 12/25/17  H/O cardiac catheterization: 11/29/16 HERBERT placed on LAD  H/O endoscopy: with dilatation of esophageal stricture 2013  S/P cataract surgery: x2; 3-4yr ago  S/P gastroplasty: 28 yrs ago  S/P cholecystectomy: more than 15 years ago  S/P total knee replacement: left, 15yr ago  S/P total knee replacement: right, 12yr ago  S/P hysterectomy: 24yr ago      FAMILY HISTORY:  No pertinent family history in first degree relatives      SOCIAL HISTORY:    Nonsmoker; Retired, lives with spouse    REVIEW OF SYSTEMS:    CONSTITUTIONAL: No weakness, fevers or chills  EYES/ENT: No visual changes;  No dysphagia  NECK: No pain or stiffness  RESPIRATORY: No cough, wheezing, hemoptysis; No shortness of breath  CARDIOVASCULAR: No chest pain No lower extremity edema  GASTROINTESTINAL: No abdominal or epigastric pain. No nausea, vomiting, or hematemesis; No diarrhea or constipation. No melena or hematochezia.  BACK: No back pain  GENITOURINARY: No dysuria, frequency or hematuria  NEUROLOGICAL: No numbness or weakness  SKIN: No itching, burning, rashes, or lesions   All other review of systems is negative unless indicated above.    PHYSICAL EXAM:  T(C): 36.6 (03-25-18 @ 11:54), Max: 37.1 (03-24-18 @ 20:46)  HR: 86 (03-25-18 @ 11:54) (84 - 95)  BP: 99/68 (03-25-18 @ 11:54) (90/42 - 105/58)  RR: 18 (03-25-18 @ 11:54) (18 - 18)  SpO2: 98% (03-25-18 @ 11:54) (98% - 100%)  Wt(kg): --  I&O's Summary    24 Mar 2018 07:01  -  25 Mar 2018 07:00  --------------------------------------------------------  IN: 4120 mL / OUT: 840 mL / NET: 3280 mL    25 Mar 2018 07:01  -  25 Mar 2018 13:38  --------------------------------------------------------  IN: 600 mL / OUT: 0 mL / NET: 600 mL        Appearance: Pleasant elderly woman, no acute distress   HEENT:   Normal oral mucosa, PERRL, EOMI	  Lymphatic: No lymphadenopathy  Cardiovascular: Normal S1 S2, No JVD, No murmurs  Respiratory: Lungs clear, slightly decreased at bases  Psychiatry: A & O x 3, Mood & affect appropriate  Gastrointestinal:  Soft, Non-tender, + BS	  Skin: No rashes, No ecchymoses, No cyanosis	  Neurologic: Non-focal  Extremities: Normal range of motion, No clubbing, cyanosis, R. knee brace  Vascular: Peripheral pulses palpable        LABS:	 	    CBC Full  -  ( 25 Mar 2018 11:20 )  WBC Count : 6.6 K/uL  Hemoglobin : 6.4 g/dL  Hematocrit : 19.6 %  Platelet Count - Automated : 125 K/uL  Mean Cell Volume : 87.3 fl  Mean Cell Hemoglobin : 28.8 pg  Mean Cell Hemoglobin Concentration : 33.0 gm/dL    03-25    138  |  105  |  26<H>  ----------------------------<  98  4.4   |  20<L>  |  1.91<H>  03-24    141  |  105  |  19  ----------------------------<  128<H>  5.1   |  22  |  1.32<H>    Ca    8.0<L>      25 Mar 2018 07:12  Ca    9.0      24 Mar 2018 07:12    TELEMETRY: 	  SR 80-100s today, overnight intermittent AIVR for 2 to 10 minutes.     ECG:    	  RADIOLOGY:      PREVIOUS DIAGNOSTIC TESTING:    [ ] Echocardiogram: < from: Transthoracic Echocardiogram (01.25.18 @ 14:10) >  ------------------------------------------------------------------------  Dimensions:    Normal Values:  LA:     3.5    2.0 - 4.0 cm  Ao:     2.3    2.0 - 3.8 cm  SEPTUM: 0.7    0.6 - 1.2 cm  PWT:    0.7    0.6 - 1.1 cm  LVIDd:  4.7    3.0 - 5.6 cm  LVIDs:  3.2    1.8 - 4.0 cm  Derived variables:  LVMI: 47 g/m2  RWT: 0.29  Fractional short: 32 %  EF (Boston Rule): 65.9 %Doppler Peak Velocity (m/sec):  AoV=3.0    < end of copied text >  < from: Transthoracic Echocardiogram (01.25.18 @ 14:10) >  Conclusions:  1. Mitral annular calcification. Mild mitral regurgitation.    2. Transcatheter aortic valve replacement. The valve is  well seated.  Peak transaortic valve gradient equals 36 mm  Hg, mean transaortic valve gradient equals 19 mm Hg, which  is probably normal in the presence of a transcatheter  aortic valve replacement. No valvular aortic valve  regurgitation and minimal paravalvular aortic  regurgitation.  3. Severely dilated left atrium.  LA volume index = 50  cc/m2.  4. Normal left ventricular systolic function. The basal  inferolateral wall, and the basal inferior wall are mildly  hypokinetic.  5. Moderate diastolic dysfunction (Stage II).  6. Right ventricular enlargement with normal right  ventricular systolic function. A device wire is noted in  the right heart.  7. Tricuspid valve not well visualized. Moderate tricuspid  regurgitation.    [ ]  Catheterization: < from: Cardiac Cath Lab - Adult (11.29.16 @ 10:01) >  CORONARY VESSELS: The coronary circulation is right dominant.  LM:   --  LM: Normal.  LAD:   --  Mid LAD: There was a 90 % stenosis.  CX:   --  OM1: There was a 50 % stenosis.  RCA:   --  RCA: Normal.  COMPLICATIONS: There were no complications.  INTERVENTIONAL RECOMMENDATIONS: ASA and Plavix for 1 year.   --  Intervention on mid LAD: drug-eluting stent. Continue TAVR work up.     	  ASSESSMENT/PLAN: 	    76 yo woman w/PMHx HLD, GERD, Anxiety, Anemia, CAD s/p HERBERT mLAD, severe AS s/p TAVR & PPM 12/17 [employer], MVR, PMR on chronic steroids, asthma, OA w/ recent TKR c/b infection explant and Abx spacer now admitted for R. total knee spacer exchange on 3/23 EP consulted for arrhythmia noted on telemetry.    #Arrhythmia  On interrogation of the device, noted that paced rhythm appears similar to telemetry wide rhythm noted overnight. Suspect that tele strips are paced complex with APCs. Testing revealed stable impedance, threshold testing and no events.   - No adjustments made to the PPM. continue with current meds  - Cardiology Dr. Roblero following   - D/w Dr. Howard.     JUAN Lomelin   [phone]

## 2018-03-25 NOTE — DIETITIAN INITIAL EVALUATION ADULT. - ORAL INTAKE PTA
fair/Per pt and HHA, pt eats very little solid food, usually ricotta cheese with blue berries and a little salad. Pt relies on two oral supplements daily. Per HHA, pt used to drink Ensure but was advised by Renal MD to switch to Nepro in setting of hyperkalemia on past admits. Pt takes MVI, vit C and iron supplements at home, noted warfarin Rx. Pt reports NKFA.

## 2018-03-25 NOTE — CHART NOTE - NSCHARTNOTEFT_GEN_A_CORE
Upon Nutritional Assessment by the Registered Dietitian your patient was determined to meet criteria / has evidence of the following diagnosis/diagnoses:          [ ]  Mild Protein Calorie Malnutrition        [ ]  Moderate Protein Calorie Malnutrition        [ ] Severe Protein Calorie Malnutrition        [ ] Unspecified Protein Calorie Malnutrition        [ ] Underweight / BMI <19        [X ] Morbid Obesity / BMI > 40      Findings as based on:  [ ] Comprehensive nutrition assessment   [ ] Nutrition Focused Physical Exam  [X ] Other: BMI 46.1      Nutrition Plan/Recommendations:    1. Recommend change diet to DASH; DM restriction not warranted. Continue Nepro tid per previous MD recommendation.  2. Reviewed Heart Healthy diet and provided handouts on Heart Healthy Nutrition Therapy and "[employer] My Plate recommendations for balanced eating.      PROVIDER Section:     By signing this assessment you are acknowledging and agree with the diagnosis/diagnoses assigned by the Registered Dietitian    Comments:

## 2018-03-25 NOTE — CONSULT NOTE ADULT - SUBJECTIVE AND OBJECTIVE BOX
Patient is a 77y old  Female who presents with a chief complaint of "Here for surgery on my right leg" (09 Mar 2018 09:48)      HPI:  77 f presents to PST for a Right Total Knee spacer exchange on 3/23/18. Pt with extensive PMH including HLD, GERD, Anxiety, Anemia, CAD s/p HERBERT, severe AS (s/p TAVR & PPM 12/17 [employer] [id number] last interrogated 11/30/17, appointment on 3/22/18 to check PPM as per Cardiology office), h/o MVR, PMR on chronic steroids, asthma, OA, s/p TKR with recent joint infection s/p explant and abx spacer on 12/23/17, pt sent to rehab and was receiving DVT prophylaxis. Pt developed nosebleed post operatively and prophylaxis was D/C'd, pt then developed RLE DVT and is now on Coumadin. Pt to see cardiologist on 3/15/18 to transition from Coumadin to Lovenox prior to scheduled surgery. Pt scheduled for IVC filter on 3/20/18. Pt presented to PST in wheelchair w/ aid. Denies chest pain or SOB. (09 Mar 2018 09:48)      77 yr female PMH HTN HLD CAD s/p PCI Severe AV s/p TAVR PPM Polymyalgia Anxiety GERD chronic back pain  presents for knee revision of infected joint.  consult called for post op medical management    pt note weakness and pain in the extremity 5/10 sharp worse w/ movement no alleviating factors. pt has has similar pain in the past but not as severe.  pt note assocated weakness malaise and chills .  no cp sob n/v/d runny nose back pain.      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  Artificial cardiac pacemaker: 12/25/17  H/O cardiac catheterization: 11/29/16 HERBERT placed on LAD  H/O endoscopy: with dilatation of esophageal stricture 2013  S/P cataract surgery: x2; 3-4yr ago  S/P gastroplasty: 28 yrs ago  S/P cholecystectomy: more than 15 years ago  S/P total knee replacement: left, 15yr ago  S/P total knee replacement: right, 12yr ago  S/P hysterectomy: 24yr ago      FAMILY HISTORY:  No pertinent family history in first degree relatives      SOCIAL HISTORY:    Allergies    amoxicillin (Other)    Intolerances    IV Contrast (Flushing (Skin); Nausea)      SUBJECTIVE / OVERNIGHT EVENTS:          ROS  REVIEW OF SYSTEMS      General- conversive cordial    Skin/Breast: intact warm   	  Ophthalmologic: no  blurry vision no double vison   	  ENMT:	hearing intact no discharge     Respiratory and Thorax: no stridor no pain   	  Cardiovascular:	no cp no palpitions     Gastrointestinal:	no bleed no constipation    Genitourinary:	    Musculoskeletal:	    Neurological:	    Psychiatric:	    Hematology/Lymphatics:	    Endocrine:	    Allergic/Immunologic:	    GENERAL: NAD, Alert   HEAD:  Atraumatic, Normocephalic  EYES: EOMI, PERRLA, conjunctiva and sclera clear  NECK: Supple, No JVD, No LAD  CHEST/LUNG: no ronchi No wheeze  HEART: Regular rate    rubs, or gallops  ABDOMEN: Soft, Nontender, Nondistended; Bowel sounds present  EXTREMITIES:  2+ Peripheral Pulses, No clubbing, cyanosis,   SKIN: No rashes or lesions        Vital Signs Last 24 Hrs  T(C): 36.9 (25 Mar 2018 05:44), Max: 37.1 (24 Mar 2018 20:46)  T(F): 98.5 (25 Mar 2018 05:44), Max: 98.7 (24 Mar 2018 20:46)  HR: 86 (25 Mar 2018 09:27) (84 - 98)  BP: 90/42 (25 Mar 2018 09:27) (90/42 - 106/65)  BP(mean): --  RR: 18 (25 Mar 2018 05:44) (18 - 18)  SpO2: 100% (25 Mar 2018 09:27) (97% - 100%)  I&O's Summary    24 Mar 2018 07:01  -  25 Mar 2018 07:00  --------------------------------------------------------  IN: 4120 mL / OUT: 840 mL / NET: 3280 mL          LABS:                        9.4    9.8   )-----------( 162      ( 24 Mar 2018 07:12 )             28.8     03-25    138  |  105  |  26<H>  ----------------------------<  98  4.4   |  20<L>  |  1.91<H>    Ca    8.0<L>      25 Mar 2018 07:12      PT/INR - ( 25 Mar 2018 09:04 )   PT: 45.5 sec;   INR: 3.92 ratio         PTT - ( 24 Mar 2018 11:12 )  PTT:33.1 sec  CAPILLARY BLOOD GLUCOSE                RADIOLOGY & ADDITIONAL TESTS:    Imaging Personally Reviewed:  [x] YES  [ ] NO    Consultant(s) Notes Reviewed:  [x] YES  [ ] NO      MEDICATIONS  (STANDING):  acetaminophen   Tablet. 975 milliGRAM(s) Oral every 8 hours  acetaminophen   Tablet. 1000 milliGRAM(s) Oral once  ascorbic acid 500 milliGRAM(s) Oral two times a day  buDESOnide 160 MICROgram(s)/formoterol 4.5 MICROgram(s) Inhaler 2 Puff(s) Inhalation two times a day  calcium carbonate 1250 mG + Vitamin D (OsCal 500 + D) 1 Tablet(s) Oral three times a day  cyclobenzaprine 5 milliGRAM(s) Oral two times a day  docusate sodium 100 milliGRAM(s) Oral three times a day  ferrous    sulfate 325 milliGRAM(s) Oral three times a day with meals  folic acid 1 milliGRAM(s) Oral daily  multivitamin 1 Tablet(s) Oral daily  pantoprazole    Tablet 40 milliGRAM(s) Oral daily  piperacillin/tazobactam IVPB. 3.375 Gram(s) IV Intermittent every 8 hours  polyethylene glycol 3350 17 Gram(s) Oral daily  simvastatin 20 milliGRAM(s) Oral at bedtime  sodium chloride 0.9% lock flush 3 milliLiter(s) IV Push every 8 hours  sodium chloride 0.9%. 1000 milliLiter(s) (160 mL/Hr) IV Continuous <Continuous>    MEDICATIONS  (PRN):  acetaminophen   Tablet 650 milliGRAM(s) Oral every 6 hours PRN For Temp greater than 38 C (100.4 F)  ALPRAZolam 0.25 milliGRAM(s) Oral daily PRN anxiety  aluminum hydroxide/magnesium hydroxide/simethicone Suspension 30 milliLiter(s) Oral four times a day PRN Indigestion  benzocaine 15 mG/menthol 3.6 mG Lozenge 1 Lozenge Oral every 4 hours PRN Sore Throat  bisacodyl Suppository 10 milliGRAM(s) Rectal daily PRN If no bowel movement by postoperative day #2  magnesium hydroxide Suspension 30 milliLiter(s) Oral daily PRN Constipation  ondansetron Injectable 4 milliGRAM(s) IV Push every 6 hours PRN Nausea and/or Vomiting  oxyCODONE    IR 5 milliGRAM(s) Oral every 4 hours PRN Moderate Pain (4 - 6)  oxyCODONE    IR 10 milliGRAM(s) Oral every 4 hours PRN Severe Pain (7 - 10)  prochlorperazine   Injectable 5 milliGRAM(s) IV Push once PRN Nausea and/or Vomiting  senna 2 Tablet(s) Oral at bedtime PRN Constipation  traMADol 50 milliGRAM(s) Oral every 6 hours PRN Mild Pain (1 - 3)  zolpidem 5 milliGRAM(s) Oral at bedtime PRN Insomnia      Care Discussed with Consultants/Other Providers [x] YES  [ ] NO    HEALTH ISSUES - PROBLEM Dx:  DVT (deep venous thrombosis): DVT (deep venous thrombosis)  Total knee replacement status, right: Total knee replacement status, right  Pacemaker: Pacemaker  Asthma: Asthma

## 2018-03-25 NOTE — PROGRESS NOTE ADULT - ASSESSMENT
A/P: 77F s/p R total knee insertion of spacer, irrigation and debridement, complex wound closure (POD 2)  - Pain control  - C/w dressings  - Prevena x5 days  -  care  - Care per primary team

## 2018-03-26 LAB
-  AMPICILLIN/SULBACTAM: SIGNIFICANT CHANGE UP
-  CEFAZOLIN: SIGNIFICANT CHANGE UP
-  CIPROFLOXACIN: SIGNIFICANT CHANGE UP
-  CLINDAMYCIN: SIGNIFICANT CHANGE UP
-  DAPTOMYCIN: SIGNIFICANT CHANGE UP
-  ERYTHROMYCIN: SIGNIFICANT CHANGE UP
-  GENTAMICIN: SIGNIFICANT CHANGE UP
-  LEVOFLOXACIN: SIGNIFICANT CHANGE UP
-  LINEZOLID: SIGNIFICANT CHANGE UP
-  MOXIFLOXACIN(AEROBIC): SIGNIFICANT CHANGE UP
-  OXACILLIN: SIGNIFICANT CHANGE UP
-  PENICILLIN: SIGNIFICANT CHANGE UP
-  RIFAMPIN: SIGNIFICANT CHANGE UP
-  TETRACYCLINE: SIGNIFICANT CHANGE UP
-  TRIMETHOPRIM/SULFAMETHOXAZOLE: SIGNIFICANT CHANGE UP
-  VANCOMYCIN: SIGNIFICANT CHANGE UP
ANION GAP SERPL CALC-SCNC: 11 MMOL/L — SIGNIFICANT CHANGE UP (ref 5–17)
BUN SERPL-MCNC: 22 MG/DL — SIGNIFICANT CHANGE UP (ref 7–23)
CALCIUM SERPL-MCNC: 8 MG/DL — LOW (ref 8.4–10.5)
CHLORIDE SERPL-SCNC: 107 MMOL/L — SIGNIFICANT CHANGE UP (ref 96–108)
CO2 SERPL-SCNC: 19 MMOL/L — LOW (ref 22–31)
CREAT SERPL-MCNC: 1.21 MG/DL — SIGNIFICANT CHANGE UP (ref 0.5–1.3)
CULTURE RESULTS: NO GROWTH — SIGNIFICANT CHANGE UP
GLUCOSE SERPL-MCNC: 96 MG/DL — SIGNIFICANT CHANGE UP (ref 70–99)
HCT VFR BLD CALC: 22.3 % — LOW (ref 34.5–45)
HCT VFR BLD CALC: 23 % — LOW (ref 34.5–45)
HCT VFR BLD CALC: 27.3 % — LOW (ref 34.5–45)
HGB BLD-MCNC: 7.6 G/DL — LOW (ref 11.5–15.5)
HGB BLD-MCNC: 8 G/DL — LOW (ref 11.5–15.5)
HGB BLD-MCNC: 9.3 G/DL — LOW (ref 11.5–15.5)
INR BLD: 1.77 RATIO — HIGH (ref 0.88–1.16)
MCHC RBC-ENTMCNC: 29.6 PG — SIGNIFICANT CHANGE UP (ref 27–34)
MCHC RBC-ENTMCNC: 29.9 PG — SIGNIFICANT CHANGE UP (ref 27–34)
MCHC RBC-ENTMCNC: 30 PG — SIGNIFICANT CHANGE UP (ref 27–34)
MCHC RBC-ENTMCNC: 33.8 GM/DL — SIGNIFICANT CHANGE UP (ref 32–36)
MCHC RBC-ENTMCNC: 34.1 GM/DL — SIGNIFICANT CHANGE UP (ref 32–36)
MCHC RBC-ENTMCNC: 34.6 GM/DL — SIGNIFICANT CHANGE UP (ref 32–36)
MCV RBC AUTO: 86.9 FL — SIGNIFICANT CHANGE UP (ref 80–100)
MCV RBC AUTO: 87.5 FL — SIGNIFICANT CHANGE UP (ref 80–100)
MCV RBC AUTO: 87.5 FL — SIGNIFICANT CHANGE UP (ref 80–100)
METHOD TYPE: SIGNIFICANT CHANGE UP
PLATELET # BLD AUTO: 122 K/UL — LOW (ref 150–400)
PLATELET # BLD AUTO: 127 K/UL — LOW (ref 150–400)
PLATELET # BLD AUTO: 140 K/UL — LOW (ref 150–400)
POTASSIUM SERPL-MCNC: 4.4 MMOL/L — SIGNIFICANT CHANGE UP (ref 3.5–5.3)
POTASSIUM SERPL-SCNC: 4.4 MMOL/L — SIGNIFICANT CHANGE UP (ref 3.5–5.3)
PROTHROM AB SERPL-ACNC: 20.2 SEC — HIGH (ref 10–13.1)
RBC # BLD: 2.55 M/UL — LOW (ref 3.8–5.2)
RBC # BLD: 2.65 M/UL — LOW (ref 3.8–5.2)
RBC # BLD: 3.12 M/UL — LOW (ref 3.8–5.2)
RBC # FLD: 15.3 % — HIGH (ref 10.3–14.5)
RBC # FLD: 15.5 % — HIGH (ref 10.3–14.5)
RBC # FLD: 15.6 % — HIGH (ref 10.3–14.5)
SODIUM SERPL-SCNC: 137 MMOL/L — SIGNIFICANT CHANGE UP (ref 135–145)
SPECIMEN SOURCE: SIGNIFICANT CHANGE UP
WBC # BLD: 6.7 K/UL — SIGNIFICANT CHANGE UP (ref 3.8–10.5)
WBC # BLD: 6.9 K/UL — SIGNIFICANT CHANGE UP (ref 3.8–10.5)
WBC # BLD: 7.2 K/UL — SIGNIFICANT CHANGE UP (ref 3.8–10.5)
WBC # FLD AUTO: 6.7 K/UL — SIGNIFICANT CHANGE UP (ref 3.8–10.5)
WBC # FLD AUTO: 6.9 K/UL — SIGNIFICANT CHANGE UP (ref 3.8–10.5)
WBC # FLD AUTO: 7.2 K/UL — SIGNIFICANT CHANGE UP (ref 3.8–10.5)

## 2018-03-26 PROCEDURE — 71045 X-RAY EXAM CHEST 1 VIEW: CPT | Mod: 26

## 2018-03-26 PROCEDURE — 99233 SBSQ HOSP IP/OBS HIGH 50: CPT

## 2018-03-26 RX ORDER — SODIUM CHLORIDE 9 MG/ML
1000 INJECTION INTRAMUSCULAR; INTRAVENOUS; SUBCUTANEOUS
Qty: 0 | Refills: 0 | Status: DISCONTINUED | OUTPATIENT
Start: 2018-03-26 | End: 2018-03-27

## 2018-03-26 RX ORDER — ENOXAPARIN SODIUM 100 MG/ML
100 INJECTION SUBCUTANEOUS EVERY 12 HOURS
Qty: 0 | Refills: 0 | Status: COMPLETED | OUTPATIENT
Start: 2018-03-26 | End: 2018-03-26

## 2018-03-26 RX ORDER — DAPTOMYCIN 500 MG/10ML
900 INJECTION, POWDER, LYOPHILIZED, FOR SOLUTION INTRAVENOUS EVERY 24 HOURS
Qty: 0 | Refills: 0 | Status: DISCONTINUED | OUTPATIENT
Start: 2018-03-26 | End: 2018-04-04

## 2018-03-26 RX ADMIN — SODIUM CHLORIDE 50 MILLILITER(S): 9 INJECTION INTRAMUSCULAR; INTRAVENOUS; SUBCUTANEOUS at 13:50

## 2018-03-26 RX ADMIN — Medication 100 MILLIGRAM(S): at 05:33

## 2018-03-26 RX ADMIN — Medication 1 MILLIGRAM(S): at 12:34

## 2018-03-26 RX ADMIN — Medication 500 MILLIGRAM(S): at 17:29

## 2018-03-26 RX ADMIN — Medication 1 TABLET(S): at 12:35

## 2018-03-26 RX ADMIN — SODIUM CHLORIDE 3 MILLILITER(S): 9 INJECTION INTRAMUSCULAR; INTRAVENOUS; SUBCUTANEOUS at 12:59

## 2018-03-26 RX ADMIN — OXYCODONE HYDROCHLORIDE 5 MILLIGRAM(S): 5 TABLET ORAL at 15:30

## 2018-03-26 RX ADMIN — Medication 100 MILLIGRAM(S): at 12:35

## 2018-03-26 RX ADMIN — ENOXAPARIN SODIUM 100 MILLIGRAM(S): 100 INJECTION SUBCUTANEOUS at 17:29

## 2018-03-26 RX ADMIN — Medication 975 MILLIGRAM(S): at 22:44

## 2018-03-26 RX ADMIN — Medication 975 MILLIGRAM(S): at 13:05

## 2018-03-26 RX ADMIN — Medication 975 MILLIGRAM(S): at 00:02

## 2018-03-26 RX ADMIN — SODIUM CHLORIDE 75 MILLILITER(S): 9 INJECTION INTRAMUSCULAR; INTRAVENOUS; SUBCUTANEOUS at 01:53

## 2018-03-26 RX ADMIN — CYCLOBENZAPRINE HYDROCHLORIDE 5 MILLIGRAM(S): 10 TABLET, FILM COATED ORAL at 17:29

## 2018-03-26 RX ADMIN — PANTOPRAZOLE SODIUM 40 MILLIGRAM(S): 20 TABLET, DELAYED RELEASE ORAL at 12:35

## 2018-03-26 RX ADMIN — SODIUM CHLORIDE 3 MILLILITER(S): 9 INJECTION INTRAMUSCULAR; INTRAVENOUS; SUBCUTANEOUS at 05:32

## 2018-03-26 RX ADMIN — OXYCODONE HYDROCHLORIDE 10 MILLIGRAM(S): 5 TABLET ORAL at 09:00

## 2018-03-26 RX ADMIN — Medication 325 MILLIGRAM(S): at 17:29

## 2018-03-26 RX ADMIN — CYCLOBENZAPRINE HYDROCHLORIDE 5 MILLIGRAM(S): 10 TABLET, FILM COATED ORAL at 05:33

## 2018-03-26 RX ADMIN — OXYCODONE HYDROCHLORIDE 5 MILLIGRAM(S): 5 TABLET ORAL at 16:00

## 2018-03-26 RX ADMIN — Medication 1 TABLET(S): at 05:34

## 2018-03-26 RX ADMIN — Medication 100 MILLIGRAM(S): at 22:18

## 2018-03-26 RX ADMIN — Medication 975 MILLIGRAM(S): at 22:18

## 2018-03-26 RX ADMIN — BUDESONIDE AND FORMOTEROL FUMARATE DIHYDRATE 2 PUFF(S): 160; 4.5 AEROSOL RESPIRATORY (INHALATION) at 12:35

## 2018-03-26 RX ADMIN — POLYETHYLENE GLYCOL 3350 17 GRAM(S): 17 POWDER, FOR SOLUTION ORAL at 12:35

## 2018-03-26 RX ADMIN — SODIUM CHLORIDE 3 MILLILITER(S): 9 INJECTION INTRAMUSCULAR; INTRAVENOUS; SUBCUTANEOUS at 21:51

## 2018-03-26 RX ADMIN — DAPTOMYCIN 136 MILLIGRAM(S): 500 INJECTION, POWDER, LYOPHILIZED, FOR SOLUTION INTRAVENOUS at 13:47

## 2018-03-26 RX ADMIN — Medication 325 MILLIGRAM(S): at 09:00

## 2018-03-26 RX ADMIN — Medication 500 MILLIGRAM(S): at 05:33

## 2018-03-26 RX ADMIN — SIMVASTATIN 20 MILLIGRAM(S): 20 TABLET, FILM COATED ORAL at 22:18

## 2018-03-26 RX ADMIN — OXYCODONE HYDROCHLORIDE 10 MILLIGRAM(S): 5 TABLET ORAL at 09:30

## 2018-03-26 RX ADMIN — MAGNESIUM HYDROXIDE 30 MILLILITER(S): 400 TABLET, CHEWABLE ORAL at 09:00

## 2018-03-26 RX ADMIN — Medication 975 MILLIGRAM(S): at 13:15

## 2018-03-26 RX ADMIN — Medication 325 MILLIGRAM(S): at 12:35

## 2018-03-26 RX ADMIN — Medication 1 TABLET(S): at 12:34

## 2018-03-26 RX ADMIN — Medication 1 TABLET(S): at 22:18

## 2018-03-26 RX ADMIN — BUDESONIDE AND FORMOTEROL FUMARATE DIHYDRATE 2 PUFF(S): 160; 4.5 AEROSOL RESPIRATORY (INHALATION) at 17:29

## 2018-03-26 NOTE — PROGRESS NOTE ADULT - ASSESSMENT
POD #3    Clinically stable from the cardiac perspective.  Stable volume status.  Resume low dose beta blocker when able/ if BP tolerates ( ie metoprolol 12.5 BID)  Tolerating enoxaparin at this time.  Transition to warfarin when hemostasis assured/no further procedures planned.  On IV abx as per primary team.    Patient has history of LBBB  Telemetry: SR, "AIVR" but per EP previous events could be retrograde invasion by paced rhythm resulting in AV prolongation and V pacing (ie not harmful)  Monitor on telemetry today; possible D/C telemetry later today  Recommendations per EP.    Will follow.    Roverto Roblero  Pager [phone]

## 2018-03-26 NOTE — PROGRESS NOTE ADULT - ASSESSMENT
77 yr female PMH HTN HLD CAD s/p PCI Severe AV s/p TAVR SSS s/p PPM Polymyalgia Anxiety GERD chronic back pain presents R knee spacer exchange and I&D and complex wound closure, post op complicated YONATAN, hypotension and supratherapeutic INC and acute blood loss anemia. wound cultures positive in knee for staph aureus.    Infected Knee - appreciate ID recs, cont vanco    Wide complex tachycardia - sustain EP consult called for evaluation, interrogate PPM    YONATAN cr trending up nephro consult called pending     Blood loss anemia ?  - hb down stat repeat ordered if below 7 transfuse ortho f/u to assess joint.   If bleeding Vitamin K stat discussed w/ NP stool occult ordered    HTN HLD CAD s/p TAVR SSS s/p ppm - c/w current med cardio f/u pending     DVT on coumadin INR supratherpautic hold comadin.      Anxiety - counseled extensively feeling better discussed w/ aid bedside    Polymyalgia - steroids pain control PT as tolerated     Asthma - respiratory status stable duonebs prn     DVT PPX    Consider transfer to medical [facility] as multiple active issues 77 yr female PMH HTN HLD CAD s/p PCI Severe AV s/p TAVR SSS s/p PPM Polymyalgia Anxiety GERD chronic back pain presents R knee spacer exchange and I&D and complex wound closure, post op complicated YONATAN, hypotension and supratherapeutic INC and acute blood loss anemia. wound cultures positive in knee for staph aureus.    Infected Knee - appreciate ID recs, cont vanco, appreciate plastics, count wound care, fu cultures for speciation, appreciate ortho recs    Wide complex tachycardia - appreciate cardio recs    YONATAN - improving, appreciate renal recs    acute blood loss anemia - possible 2/2 recent surgery, sp 2 units prbc, hgb proper response CBC BID, maintain hgb >8     HTN HLD CAD s/p TAVR SSS s/p ppm - c/w current meds    DVT on coumadin - INR supratherpeutic sp 7.5mg PO vit K now INR 1.7, will resume lovenox but hold coumadin bridge until H/H stable    Polymyalgia - steroids pain control PT as tolerated     Asthma - respiratory status stable duonebs prn     DVT PPX    plan of care dw patient and NP 77 yr female PMH HTN HLD CAD s/p PCI Severe AV s/p TAVR SSS s/p PPM Polymyalgia Anxiety GERD chronic back pain presents R knee spacer exchange and I&D and complex wound closure, post op complicated YONATAN, hypotension and supratherapeutic INC and acute blood loss anemia. wound cultures positive in knee for staph aureus.    Infected Knee - appreciate ID recs, cont vanco, appreciate plastics, count wound care, fu cultures for speciation, appreciate ortho recs    Wide complex tachycardia - appreciate cardio recs    YONATAN - improving, appreciate renal recs    acute blood loss anemia - possible 2/2 recent surgery, sp 2 units prbc, hgb proper response CBC BID, maintain hgb >8     HTN HLD CAD s/p TAVR SSS s/p ppm - c/w current meds    DVT on coumadin - INR supratherpeutic sp 7.5mg PO vit K now INR 1.7, will resume lovenox but hold coumadin bridge until H/H stable    Polymyalgia - steroids pain control PT as tolerated     Asthma - respiratory status stable duonebs prn     DVT PPX    plan of care dw patient and NP and ortho PA

## 2018-03-26 NOTE — PROGRESS NOTE ADULT - SUBJECTIVE AND OBJECTIVE BOX
Patient seen and examined.  2units transfused yesterday with appropriate response  Vanc level returned subtherapeutic and patient dosed 1g yesterday  Pain is well controlled. No nausea, vomiting, chest pain, shortness of breath, lightheadedness, or dizziness.     Gen: NAD  RLE  Dressing clean, dry, intact in BJKI  EHL/FHL/TA/GS intact  SILT DP/SP/Stone/Sa  WWP distally             T(C): 37.4 (03-26-18 @ 04:40), Max: 37.6 (03-25-18 @ 19:30)  HR: 82 (03-26-18 @ 04:40) (82 - 106)  BP: 111/68 (03-26-18 @ 04:40) (90/42 - 120/67)  RR: 18 (03-26-18 @ 04:40) (18 - 18)  SpO2: 96% (03-26-18 @ 04:40) (94% - 100%)  Wt(kg): --                          8.0    6.9   )-----------( 122      ( 26 Mar 2018 04:47 )             23.0     03-26    137  |  107  |  22  ----------------------------<  96  4.4   |  19<L>  |  1.21    Ca    8.0<L>      26 Mar 2018 04:47      PT/INR - ( 25 Mar 2018 21:50 )   PT: 45.7 sec;   INR: 3.93 ratio         PTT - ( 24 Mar 2018 11:12 )  PTT:33.1 secOrthopedic Surgery Progress Note    76 yo F s/p Removal of articulating abx cement spacer, Rev to Non articulating Spacer POD 3    Analgesia  FU ORCx, growing staph aureus  FU BCx   FU ID C/s - awaiting culture results  FU renal/medicine rec's  FU vanc levels, dosing by levels for now  Avoid nephrotoxic meds  Holding anticoagulation for now d/t supratherapeutic INR  Incentive spirometry  50% PWB In KI at all times, no knee ROM allowed   PT/OT/OOB

## 2018-03-26 NOTE — PROVIDER CONTACT NOTE (CRITICAL VALUE NOTIFICATION) - TEST AND RESULT REPORTED:
tissue collected from March 23, 2018 at 19: 38: María Elena MRSA tissue on right knee collected from March 23, 2018 at 19: 38: María Elena MRSA

## 2018-03-26 NOTE — PROGRESS NOTE ADULT - SUBJECTIVE AND OBJECTIVE BOX
Plastic Surgery Progress Note    S: Patient seen and examined.  2 units PRBC yesterday.     Vital Signs Last 24 Hrs  T(C): 37.4 (26 Mar 2018 04:40), Max: 37.6 (25 Mar 2018 19:30)  T(F): 99.4 (26 Mar 2018 04:40), Max: 99.7 (25 Mar 2018 19:30)  HR: 82 (26 Mar 2018 04:40) (82 - 106)  BP: 111/68 (26 Mar 2018 04:40) (90/42 - 120/67)  BP(mean): --  RR: 18 (26 Mar 2018 04:40) (18 - 18)  SpO2: 96% (26 Mar 2018 04:40) (94% - 100%)  I&O's Detail    25 Mar 2018 07:01  -  26 Mar 2018 07:00  --------------------------------------------------------  IN:    IV PiggyBack: 250 mL    Oral Fluid: 600 mL    Packed Red Blood Cells: 300 mL    sodium chloride 0.9%: 100 mL    sodium chloride 0.9%: 640 mL    sodium chloride 0.9%.: 1125 mL  Total IN: 3015 mL    OUT:    Drain: 40 mL    Indwelling Catheter - Urethral: 250 mL    Voided: 850 mL  Total OUT: 1140 mL    Total NET: 1875 mL          Physical Exam:  General: Awake and alert, NAD  R Leg: ace and keen immobilizer intact and clean.  drain SS, prevena holdin suction

## 2018-03-26 NOTE — PROGRESS NOTE ADULT - SUBJECTIVE AND OBJECTIVE BOX
Patient seen and examined  no complaints  no sob    amoxicillin (Other)  IV Contrast (Flushing (Skin); Nausea)    Hospital Medications:   MEDICATIONS  (STANDING):  acetaminophen   Tablet. 975 milliGRAM(s) Oral every 8 hours  acetaminophen   Tablet. 1000 milliGRAM(s) Oral once  ascorbic acid 500 milliGRAM(s) Oral two times a day  buDESOnide 160 MICROgram(s)/formoterol 4.5 MICROgram(s) Inhaler 2 Puff(s) Inhalation two times a day  calcium carbonate 1250 mG + Vitamin D (OsCal 500 + D) 1 Tablet(s) Oral three times a day  cyclobenzaprine 5 milliGRAM(s) Oral two times a day  docusate sodium 100 milliGRAM(s) Oral three times a day  enoxaparin Injectable 100 milliGRAM(s) SubCutaneous every 12 hours  ferrous    sulfate 325 milliGRAM(s) Oral three times a day with meals  folic acid 1 milliGRAM(s) Oral daily  multivitamin 1 Tablet(s) Oral daily  pantoprazole    Tablet 40 milliGRAM(s) Oral daily  polyethylene glycol 3350 17 Gram(s) Oral daily  simvastatin 20 milliGRAM(s) Oral at bedtime  sodium chloride 0.9% lock flush 3 milliLiter(s) IV Push every 8 hours  sodium chloride 0.9%. 1000 milliLiter(s) (75 mL/Hr) IV Continuous <Continuous>  vancomycin  IVPB 1000 milliGRAM(s) IV Intermittent every 24 hours      VITALS:  T(F): 98.9 (03-26-18 @ 08:57), Max: 99.7 (03-25-18 @ 19:30)  HR: 89 (03-26-18 @ 08:57)  BP: 105/48 (03-26-18 @ 08:57)  RR: 18 (03-26-18 @ 08:57)  SpO2: 95% (03-26-18 @ 08:57)  Wt(kg): --    03-25 @ 07:01  -  03-26 @ 07:00  --------------------------------------------------------  IN: 3015 mL / OUT: 1140 mL / NET: 1875 mL    03-26 @ 07:01  -  03-26 @ 11:56  --------------------------------------------------------  IN: 240 mL / OUT: 0 mL / NET: 240 mL    PHYSICAL EXAM:  Constitutional: NAD; pale  HEENT: anicteric sclera, oropharynx clear, MMM  Neck: No JVD  Respiratory:b/l  rhonchi  Cardiovascular: S1, S2, RRR  Gastrointestinal: BS+, soft, NT/ND  Extremities: 4+edema; right knee immobilizer  Neurological: A/O x 3, no focal deficits  Psychiatric: Normal mood, normal affect  : +indwelling lund.   Skin: No rashes    LABS:  03-26    137  |  107  |  22  ----------------------------<  96  4.4   |  19<L>  |  1.21    Ca    8.0<L>      26 Mar 2018 04:47      Creatinine Trend: 1.21 <--, 1.91 <--, 1.32 <--, 0.84 <--                        8.0    6.9   )-----------( 122      ( 26 Mar 2018 04:47 )             23.0     Urine Studies:    Creatinine, Random Urine: 198 mg/dL (03-25 @ 10:17)  Osmolality, Random Urine: 355 mos/kg (03-25 @ 10:17)  Chloride, Random Urine: <35 mmol/L (03-25 @ 10:17)  Potassium, Random Urine: 55 mmol/L (03-25 @ 10:17)  Sodium, Random Urine: 28 mmol/L (03-25 @ 10:17)    RADIOLOGY & ADDITIONAL STUDIES:

## 2018-03-26 NOTE — CHART NOTE - NSCHARTNOTEFT_GEN_A_CORE
Patient case discussed with PICC nurse and Dr. Marrero (medicine). Per PICC nurse in order to place PICC tomorrow AM must hold Lovenox overnight, prefer INR closer to 1.5. Dr. Marrero ok with holding Lovenox at midnight. Will recheck INR in AM. Plan for PICC placement tomorrow AM.

## 2018-03-26 NOTE — CHART NOTE - NSCHARTNOTEFT_GEN_A_CORE
78 y/o female with history of HTN, HLD, CAD s/p PCI, Severe AV s/p TAVR, SSS s/p PPM, Polymyalgia, Anxiety, GERD, chronic back pain presents for knee revision of infected joint, s/p R knee spacer exchange and I&D and complex wound closure.    Patient with  H/H 7.1/22.3; INR 1,77 today     Event Summary: Pt. seen and examined at bedside, A+Ox3, in NAD. Labs reviewed this am with Dr. Marrero      # Anemia   >  Plan to transfuse with Hgb < 8.0  >  1 units of PRBC's ordered for today   >  Follow up  post transfusion CBC  >  No signs of active bleeding noted   >  CBC changed to q 12hr   >  IV fluid decreased to 0.9NS at 50 cc/hr     # Subtherapeutic  INR  > Started on Lovenox 100 mg sq BID - no bridge to coumadin due to low H/H   > Bleeding precaution    #  Infected  right prosthetic knee   > Tissue cultures + for MSRA  > Blood and Urine cultures negative to date   > Started on Daptomycin per ID   > Vancomycin discontinued    Will continue for monitor   Kristen Newman DNP- C  [id number]

## 2018-03-26 NOTE — PROGRESS NOTE ADULT - SUBJECTIVE AND OBJECTIVE BOX
Patient is a 77y old  Female who presents with a chief complaint of "Here for surgery on my right leg" (09 Mar 2018 09:48)      SUBJECTIVE / OVERNIGHT EVENTS:    Patient seen and examined. feels week. denies pain.       Vital Signs Last 24 Hrs  T(C): 37.2 (26 Mar 2018 08:57), Max: 37.6 (25 Mar 2018 19:30)  T(F): 98.9 (26 Mar 2018 08:57), Max: 99.7 (25 Mar 2018 19:30)  HR: 89 (26 Mar 2018 08:57) (82 - 106)  BP: 105/48 (26 Mar 2018 08:57) (99/68 - 120/67)  BP(mean): --  RR: 18 (26 Mar 2018 08:57) (18 - 18)  SpO2: 95% (26 Mar 2018 08:57) (94% - 98%)  I&O's Summary    25 Mar 2018 07:01  -  26 Mar 2018 07:00  --------------------------------------------------------  IN: 3015 mL / OUT: 1140 mL / NET: 1875 mL        PE:  GENERAL: NAD, AAOx3, obese  HEAD:  Atraumatic, Normocephalic  EYES: EOMI, PERRLA, conjunctiva and sclera clear  NECK: Supple, No JVD  CHEST/LUNG: CTABL, No wheeze  HEART: Regular rate and rhythm; no murmur  ABDOMEN: Soft, Nontender, Nondistended; Bowel sounds present  EXTREMITIES:  2+ Peripheral Pulses, right leg in boot, wound vac, dressed  SKIN: No rashes or lesions  NEURO: No focal deficits    LABS:                        8.0    6.9   )-----------( 122      ( 26 Mar 2018 04:47 )             23.0     03-26    137  |  107  |  22  ----------------------------<  96  4.4   |  19<L>  |  1.21    Ca    8.0<L>      26 Mar 2018 04:47      PT/INR - ( 26 Mar 2018 06:09 )   PT: 20.2 sec;   INR: 1.77 ratio         PTT - ( 24 Mar 2018 11:12 )  PTT:33.1 sec  CAPILLARY BLOOD GLUCOSE      POCT Blood Glucose.: 88 mg/dL (25 Mar 2018 16:16)            RADIOLOGY & ADDITIONAL TESTS:    Imaging Personally Reviewed:  [x] YES  [ ] NO    Consultant(s) Notes Reviewed:  [x] YES  [ ] NO    MEDICATIONS  (STANDING):  acetaminophen   Tablet. 975 milliGRAM(s) Oral every 8 hours  acetaminophen   Tablet. 1000 milliGRAM(s) Oral once  ascorbic acid 500 milliGRAM(s) Oral two times a day  buDESOnide 160 MICROgram(s)/formoterol 4.5 MICROgram(s) Inhaler 2 Puff(s) Inhalation two times a day  calcium carbonate 1250 mG + Vitamin D (OsCal 500 + D) 1 Tablet(s) Oral three times a day  cyclobenzaprine 5 milliGRAM(s) Oral two times a day  docusate sodium 100 milliGRAM(s) Oral three times a day  ferrous    sulfate 325 milliGRAM(s) Oral three times a day with meals  folic acid 1 milliGRAM(s) Oral daily  multivitamin 1 Tablet(s) Oral daily  pantoprazole    Tablet 40 milliGRAM(s) Oral daily  polyethylene glycol 3350 17 Gram(s) Oral daily  simvastatin 20 milliGRAM(s) Oral at bedtime  sodium chloride 0.9% lock flush 3 milliLiter(s) IV Push every 8 hours  sodium chloride 0.9%. 1000 milliLiter(s) (75 mL/Hr) IV Continuous <Continuous>  vancomycin  IVPB 1000 milliGRAM(s) IV Intermittent every 24 hours    MEDICATIONS  (PRN):  acetaminophen   Tablet 650 milliGRAM(s) Oral every 6 hours PRN For Temp greater than 38 C (100.4 F)  ALPRAZolam 0.25 milliGRAM(s) Oral daily PRN anxiety  aluminum hydroxide/magnesium hydroxide/simethicone Suspension 30 milliLiter(s) Oral four times a day PRN Indigestion  benzocaine 15 mG/menthol 3.6 mG Lozenge 1 Lozenge Oral every 4 hours PRN Sore Throat  bisacodyl Suppository 10 milliGRAM(s) Rectal daily PRN If no bowel movement by postoperative day #2  magnesium hydroxide Suspension 30 milliLiter(s) Oral daily PRN Constipation  ondansetron Injectable 4 milliGRAM(s) IV Push every 6 hours PRN Nausea and/or Vomiting  oxyCODONE    IR 5 milliGRAM(s) Oral every 4 hours PRN Moderate Pain (4 - 6)  oxyCODONE    IR 10 milliGRAM(s) Oral every 4 hours PRN Severe Pain (7 - 10)  prochlorperazine   Injectable 5 milliGRAM(s) IV Push once PRN Nausea and/or Vomiting  senna 2 Tablet(s) Oral at bedtime PRN Constipation  zolpidem 5 milliGRAM(s) Oral at bedtime PRN Insomnia      Care Discussed with Consultants/Other Providers [x] YES  [ ] NO    HEALTH ISSUES - PROBLEM Dx:  Anemia due to blood loss: Anemia due to blood loss  Acute kidney injury superimposed on chronic kidney disease: Acute kidney injury superimposed on chronic kidney disease  DVT (deep venous thrombosis): DVT (deep venous thrombosis)  Total knee replacement status, right: Total knee replacement status, right  Pacemaker: Pacemaker  Asthma: Asthma Patient is a 77y old  Female who presents with a chief complaint of "Here for surgery on my right leg" (09 Mar 2018 09:48)      SUBJECTIVE / OVERNIGHT EVENTS:    Patient seen and examined. feels week. denies pain.       Vital Signs Last 24 Hrs  T(C): 37.2 (26 Mar 2018 08:57), Max: 37.6 (25 Mar 2018 19:30)  T(F): 98.9 (26 Mar 2018 08:57), Max: 99.7 (25 Mar 2018 19:30)  HR: 89 (26 Mar 2018 08:57) (82 - 106)  BP: 105/48 (26 Mar 2018 08:57) (99/68 - 120/67)  BP(mean): --  RR: 18 (26 Mar 2018 08:57) (18 - 18)  SpO2: 95% (26 Mar 2018 08:57) (94% - 98%)  I&O's Summary    25 Mar 2018 07:01  -  26 Mar 2018 07:00  --------------------------------------------------------  IN: 3015 mL / OUT: 1140 mL / NET: 1875 mL        PE:  GENERAL: NAD, AAOx3, obese  HEAD:  Atraumatic, Normocephalic  EYES: EOMI, PERRLA, conjunctiva and sclera clear  NECK: Supple, No JVD  CHEST/LUNG: CTABL, No wheeze  HEART: Regular rate and rhythm; no murmur  ABDOMEN: Soft, Nontender, Nondistended; Bowel sounds present  EXTREMITIES:  2+ Peripheral Pulses, right leg in boot, wound vac, dressed, right kneeJP  SKIN: No rashes or lesions  NEURO: No focal deficits    LABS:                        8.0    6.9   )-----------( 122      ( 26 Mar 2018 04:47 )             23.0     03-26    137  |  107  |  22  ----------------------------<  96  4.4   |  19<L>  |  1.21    Ca    8.0<L>      26 Mar 2018 04:47      PT/INR - ( 26 Mar 2018 06:09 )   PT: 20.2 sec;   INR: 1.77 ratio         PTT - ( 24 Mar 2018 11:12 )  PTT:33.1 sec  CAPILLARY BLOOD GLUCOSE      POCT Blood Glucose.: 88 mg/dL (25 Mar 2018 16:16)            RADIOLOGY & ADDITIONAL TESTS:    Imaging Personally Reviewed:  [x] YES  [ ] NO    Consultant(s) Notes Reviewed:  [x] YES  [ ] NO    MEDICATIONS  (STANDING):  acetaminophen   Tablet. 975 milliGRAM(s) Oral every 8 hours  acetaminophen   Tablet. 1000 milliGRAM(s) Oral once  ascorbic acid 500 milliGRAM(s) Oral two times a day  buDESOnide 160 MICROgram(s)/formoterol 4.5 MICROgram(s) Inhaler 2 Puff(s) Inhalation two times a day  calcium carbonate 1250 mG + Vitamin D (OsCal 500 + D) 1 Tablet(s) Oral three times a day  cyclobenzaprine 5 milliGRAM(s) Oral two times a day  docusate sodium 100 milliGRAM(s) Oral three times a day  ferrous    sulfate 325 milliGRAM(s) Oral three times a day with meals  folic acid 1 milliGRAM(s) Oral daily  multivitamin 1 Tablet(s) Oral daily  pantoprazole    Tablet 40 milliGRAM(s) Oral daily  polyethylene glycol 3350 17 Gram(s) Oral daily  simvastatin 20 milliGRAM(s) Oral at bedtime  sodium chloride 0.9% lock flush 3 milliLiter(s) IV Push every 8 hours  sodium chloride 0.9%. 1000 milliLiter(s) (75 mL/Hr) IV Continuous <Continuous>  vancomycin  IVPB 1000 milliGRAM(s) IV Intermittent every 24 hours    MEDICATIONS  (PRN):  acetaminophen   Tablet 650 milliGRAM(s) Oral every 6 hours PRN For Temp greater than 38 C (100.4 F)  ALPRAZolam 0.25 milliGRAM(s) Oral daily PRN anxiety  aluminum hydroxide/magnesium hydroxide/simethicone Suspension 30 milliLiter(s) Oral four times a day PRN Indigestion  benzocaine 15 mG/menthol 3.6 mG Lozenge 1 Lozenge Oral every 4 hours PRN Sore Throat  bisacodyl Suppository 10 milliGRAM(s) Rectal daily PRN If no bowel movement by postoperative day #2  magnesium hydroxide Suspension 30 milliLiter(s) Oral daily PRN Constipation  ondansetron Injectable 4 milliGRAM(s) IV Push every 6 hours PRN Nausea and/or Vomiting  oxyCODONE    IR 5 milliGRAM(s) Oral every 4 hours PRN Moderate Pain (4 - 6)  oxyCODONE    IR 10 milliGRAM(s) Oral every 4 hours PRN Severe Pain (7 - 10)  prochlorperazine   Injectable 5 milliGRAM(s) IV Push once PRN Nausea and/or Vomiting  senna 2 Tablet(s) Oral at bedtime PRN Constipation  zolpidem 5 milliGRAM(s) Oral at bedtime PRN Insomnia      Care Discussed with Consultants/Other Providers [x] YES  [ ] NO    HEALTH ISSUES - PROBLEM Dx:  Anemia due to blood loss: Anemia due to blood loss  Acute kidney injury superimposed on chronic kidney disease: Acute kidney injury superimposed on chronic kidney disease  DVT (deep venous thrombosis): DVT (deep venous thrombosis)  Total knee replacement status, right: Total knee replacement status, right  Pacemaker: Pacemaker  Asthma: Asthma

## 2018-03-26 NOTE — PROGRESS NOTE ADULT - SUBJECTIVE AND OBJECTIVE BOX
Cardiology/Vascular Medicine Inpatient Progress Note    POD #3 rt total knee spacer exchange  Patient with known history of LBBB, SSS s/p PPM  Telemetry: currently SR without events overnight  "AIVR" noted, but per EP previous events could be retrograde invasion by paced rhythm; telemetry/device interrogation reviewed with EP  Possible d/c telemetry observation later today    Vital Signs Last 24 Hrs  T(C): 37.4 (26 Mar 2018 04:40), Max: 37.6 (25 Mar 2018 19:30)  T(F): 99.4 (26 Mar 2018 04:40), Max: 99.7 (25 Mar 2018 19:30)  HR: 82 (26 Mar 2018 04:40) (82 - 106)  BP: 111/68 (26 Mar 2018 04:40) (90/42 - 120/67)  BP(mean): --  RR: 18 (26 Mar 2018 04:40) (18 - 18)  SpO2: 96% (26 Mar 2018 04:40) (94% - 100%)    Appearance: NAD, chronically ill appearing, obese  HEENT:   Normal oral mucosa, PERRL, EOMI	  Lymphatic: No lymphadenopathy  Cardiovascular: Normal S1 S2, No JVD, + LE edema  Respiratory: Decreased BS b/l bases	  Psychiatry: Awake, alert  Gastrointestinal:  Soft, Non-tender, + BS	  Skin: No rashes, No ecchymoses, No cyanosis	  Neurologic: Non-focal  Extremities: +Rt knee brace, drainage in place  Vascular: Peripheral pulses palpable 2+ bilaterally    MEDICATIONS  (STANDING):  acetaminophen   Tablet. 975 milliGRAM(s) Oral every 8 hours  acetaminophen   Tablet. 1000 milliGRAM(s) Oral once  ascorbic acid 500 milliGRAM(s) Oral two times a day  buDESOnide 160 MICROgram(s)/formoterol 4.5 MICROgram(s) Inhaler 2 Puff(s) Inhalation two times a day  calcium carbonate 1250 mG + Vitamin D (OsCal 500 + D) 1 Tablet(s) Oral three times a day  cyclobenzaprine 5 milliGRAM(s) Oral two times a day  docusate sodium 100 milliGRAM(s) Oral three times a day  ferrous    sulfate 325 milliGRAM(s) Oral three times a day with meals  folic acid 1 milliGRAM(s) Oral daily  multivitamin 1 Tablet(s) Oral daily  pantoprazole    Tablet 40 milliGRAM(s) Oral daily  polyethylene glycol 3350 17 Gram(s) Oral daily  simvastatin 20 milliGRAM(s) Oral at bedtime  sodium chloride 0.9% lock flush 3 milliLiter(s) IV Push every 8 hours  sodium chloride 0.9%. 1000 milliLiter(s) (75 mL/Hr) IV Continuous <Continuous>  vancomycin  IVPB 1000 milliGRAM(s) IV Intermittent every 24 hours    MEDICATIONS  (PRN):  acetaminophen   Tablet 650 milliGRAM(s) Oral every 6 hours PRN For Temp greater than 38 C (100.4 F)  ALPRAZolam 0.25 milliGRAM(s) Oral daily PRN anxiety  aluminum hydroxide/magnesium hydroxide/simethicone Suspension 30 milliLiter(s) Oral four times a day PRN Indigestion  benzocaine 15 mG/menthol 3.6 mG Lozenge 1 Lozenge Oral every 4 hours PRN Sore Throat  bisacodyl Suppository 10 milliGRAM(s) Rectal daily PRN If no bowel movement by postoperative day #2  magnesium hydroxide Suspension 30 milliLiter(s) Oral daily PRN Constipation  ondansetron Injectable 4 milliGRAM(s) IV Push every 6 hours PRN Nausea and/or Vomiting  oxyCODONE    IR 5 milliGRAM(s) Oral every 4 hours PRN Moderate Pain (4 - 6)  oxyCODONE    IR 10 milliGRAM(s) Oral every 4 hours PRN Severe Pain (7 - 10)  prochlorperazine   Injectable 5 milliGRAM(s) IV Push once PRN Nausea and/or Vomiting  senna 2 Tablet(s) Oral at bedtime PRN Constipation  zolpidem 5 milliGRAM(s) Oral at bedtime PRN Insomnia      LABS:	 	                                8.0    6.9   )-----------( 122      ( 26 Mar 2018 04:47 )             23.0     03-26    137  |  107  |  22  ----------------------------<  96  4.4   |  19<L>  |  1.21    Ca    8.0<L>      26 Mar 2018 04:47      PT/INR - ( 25 Mar 2018 21:50 )   PT: 45.7 sec;   INR: 3.93 ratio    PTT - ( 24 Mar 2018 11:12 )  PTT:33.1 sec      < from: Limited Transthoracic Echo (12.27.17 @ 14:06) >    Patient name: IRASEMA KOHLER  YOB: 1940   Age: 77 (F)   MR#: [id number]  Study Date: 12/27/2017  Location: [id number]: Lauren Finkelstein  Study quality: Limited  Referring Physician: Sammy Galvan MD / Garo Sorto MD / Roverto Roblero MD  Blood Pressure: 139/52 mmHg  Height: 157 cm  Weight: 131 kg  BSA: 2.2 m2  ------------------------------------------------------------------------  PROCEDURE: Limited transthoracic echocardiogram with 2-D.  M-Mode and spectral and color flow Doppler.  INDICATION: Endocarditis, valve unspecified (I38)  ------------------------------------------------------------------------  OBSERVATIONS:  Mitral Valve: Mitral annular calcification and calcified  mitral leaflets with decreased diastolic opening. Peak  mitral valve gradient equals 14 mm Hg, mean transmitral  valve gradient equals 6 mm Hg, consistent with moderate  mitral stenosis.  Aortic Root: Normal aortic root, aortic arch and descending  thoracic aorta.  Aortic Valve:Bioprosthetic aortic valve replacement. s/p  TAVR.  Left Atrium: Normal left atrium.  Left Ventricle: Normal left ventricular systolic function.  No segmental wall motion abnormalities. Normal left  ventricular internal dimensions and wall thicknesses.  Right Heart: Normal right atrium. Normal right ventricular  size and function. Normal tricuspid valve. Normal pulmonic  valve.  Pericardium/PleuraNormal pericardium with no pericardial  effusion.  ------------------------------------------------------------------------  CONCLUSIONS:  1. Mitral annular calcification and calcified mitral  leaflets with decreased diastolic opening. Peak mitral  valve gradient equals 14 mm Hg, mean transmitral valve  gradient equals 6 mm Hg, consistent with moderate mitral  stenosis.  2. Bioprosthetic aortic valve replacement. s/p TAVR.  3. Normal left ventricular systolic function. No segmental  wall motion abnormalities.  *** Compared with echocardiogram of 12/19/2017, no  significant changes noted. There isno obvious evidence of  endocarditis.  ------------------------------------------------------------------------  Confirmed on  12/27/2017 - 17:13:04 by Filipe Esparza M.D.  ------------------------------------------------------------------------    < end of copied text >      < from: Xray Knee 1 or 2 Views, Right (03.23.18 @ 18:26) >    EXAM:  KNEE; 1 OR 2 VIEWS - RIGHT                            PROCEDURE DATE:  03/23/2018            INTERPRETATION:  INDICATION: post-op. Pain    COMPARISON: Knee radiographs dated 1/25/2018    FINDING: Two views of the knee demonstrates interval placement of an   intramedullary ruthann bridging the distal femur and proximal tibia. There is   cement material within the knee joint space. The findings are compatible   with arthrodesis and cement spacer placement. There are surgical clips,   soft tissue swelling and draining keeping with recent surgery.    IMPRESSION:   Interval postoperative changes of the knee with cement spacer and fusion   ruthann in place.        AARTI DIEGO M.D., ATTENDING RADIOLOGIST  This document has been electronically signed. Mar 24 2018 12:15AM        < end of copied text >

## 2018-03-26 NOTE — PROGRESS NOTE ADULT - ATTENDING COMMENTS
(preliminary note until this message is removed)    I agree with the above note and have personally seen and examined this patient. All pertinent films have been reviewed. Please refer to clinical documentation of the history, physical examinations, data summary, and both assessment and plan as documented above and with which I agree.    patient is s/p R knee spacer exchange and I&D and complex wound closure. she has kasia postop as well as hypotension and supratherapeutic inr. wound cultures positive in knee for staph aureus, further speciation pending    plan  1. hospitalist- appreciate their primary management on this medically complex patient.   2. INR goal 2-3, currently supratherapeutic and receiving vit K, follow up todays INR, holding lovenox as now supratherapeutic  3. f/u am cbc, has acute blood loss anemia secondary to surgery as well as dilution response from IVF, transfuse as needed  4. cards - Dr. Roblero following, he spoke with EP who stated that abnormal tele results may be normal 2/2 ventricular paced ppm. will monitor on tele for another 24 hours. pending further recs  5. renal - patient has kasia on ckd in setting of high dose antibiotic spacer, iv vanc, toradol, and lovenox. holding nephrotoxic meds. ordered renal panel and kidney u/s. cont ivf and lund, Cr now improving significantly  6. id- Dr. Cannon recs stopping zosyn, vanc trough (random) was low so 1/2 dose vancomycin given. desired level 15-20. pending further recs on abx guidance from ID, would prefer long term IV abx followed by oral course, likely will need PICC, currently staph aureus PJI, pending further speciation and sensitivities  7. PICC to be ordered today if ID recommends, bcx ngtd x 48 hours now  8. pain control with po meds, holding nsaids, cont tylenol and tramadol/oxycodone prn, avoid nsaids in setting of kasia, pain currently well controlled  9. pt/ot - if stable okay for oob to beside chair    Nate Bass MD  Attending Orthopedic Surgeon . (preliminary note until this message is removed)    I agree with the above note and have personally seen and examined this patient. All pertinent films have been reviewed. Please refer to clinical documentation of the history, physical examinations, data summary, and both assessment and plan as documented above and with which I agree.    patient is s/p R knee spacer exchange and I&D and complex wound closure. she has kasia postop as well as hypotension and supratherapeutic inr. wound cultures positive in knee for MRSA    plan  1. hospitalist- appreciate their primary management on this medically complex patient.   2. INR goal 2-3, became supratherapeutic and receiving vit K, now below 2, will need careful titration to goal inr 2-3  3. has acute blood loss anemia secondary to surgery as well as dilution response from IVF, transfuse as needed  4. cards - Dr. Roblero following, he spoke with EP who stated that abnormal tele results may be normal 2/2 ventricular paced ppm. will monitor on tele for another 24 hours. pending further recs  5. renal - patient has kasia on ckd in setting of high dose antibiotic spacer, iv vanc, toradol, and lovenox. holding nephrotoxic meds. ordered renal panel and kidney u/s. cont ivf and lund, Cr now improving significantly  6. id- on vanc, will transition to less nephrotoxic med per ID guidance, vanc trough (random) was low so 1/2 dose vancomycin given. desired level 15-20. pending further recs on abx guidance from ID, would prefer long term IV abx followed by oral course  7. Will need PICC  8. pain control with po meds, holding nsaids, cont tylenol and tramadol/oxycodone prn, avoid nsaids in setting of ksaia, pain currently well controlled  9. pt/ot - if stable okay for oob to beside chair  10. wound care, drain care per plastics team    Nate Bass MD  Attending Orthopedic Surgeon . (preliminary note until this message is removed)    I agree with the above note and have personally seen and examined this patient. All pertinent films have been reviewed. Please refer to clinical documentation of the history, physical examinations, data summary, and both assessment and plan as documented above and with which I agree.    patient is s/p R knee spacer exchange and I&D and complex wound closure. she has kasia postop as well as hypotension and supratherapeutic inr. wound cultures positive in knee for MRSA    plan  1. hospitalist- appreciate their primary management on this medically complex patient.   2. INR goal 2-3, became supratherapeutic and receiving vit K, now below 2, will need careful titration to goal inr 2-3  3. has acute blood loss anemia secondary to surgery as well as dilution response from IVF, transfuse as needed  4. cards - Dr. Roblero following, he spoke with EP who stated that abnormal tele results may be normal 2/2 ventricular paced ppm. will monitor on tele for another 24 hours. pending further recs  5. renal - patient has kasia on ckd in setting of high dose antibiotic spacer, iv vanc, toradol, and lovenox. holding nephrotoxic meds. ordered renal panel and kidney u/s. cont ivf and lund, Cr now improving significantly  6. id- on vanc, will transition to less nephrotoxic med per ID guidance, vanc trough (random) was low so 1/2 dose vancomycin given. desired level 15-20. pending further recs on abx guidance from ID, would prefer long term IV abx followed by oral course  7. Will need PICC  8. pain control with po meds, holding nsaids, cont tylenol and tramadol/oxycodone prn, avoid nsaids in setting of kasia, pain currently well controlled  9. pt/ot - if stable okay for oob to beside chair  10. wound care, drain care per plastics team  11. nicole lund when able    Nate Bass MD  Attending Orthopedic Surgeon . I agree with the above note and have personally seen and examined this patient. All pertinent films have been reviewed. Please refer to clinical documentation of the history, physical examinations, data summary, and both assessment and plan as documented above and with which I agree.    patient is s/p R knee spacer exchange and I&D and complex wound closure. she has kasia postop as well as hypotension and supratherapeutic inr. wound cultures positive in knee for MRSA    plan  1. hospitalist- appreciate their primary management on this medically complex patient.   2. INR goal 2-3, became supratherapeutic and receiving vit K, now below 2, will need careful titration to goal inr 2-3  3. has acute blood loss anemia secondary to surgery as well as dilution response from IVF, transfuse as needed  4. cards - Dr. Roblero following, he spoke with EP who stated that abnormal tele results may be normal 2/2 ventricular paced ppm. will monitor on tele for another 24 hours. pending further recs  5. renal - patient has kasia on ckd in setting of high dose antibiotic spacer, iv vanc, toradol, and lovenox. holding nephrotoxic meds. ordered renal panel and kidney u/s. cont ivf and lund, Cr now improving significantly  6. id- on vanc, will transition to less nephrotoxic med per ID guidance, vanc trough (random) was low so 1/2 dose vancomycin given. desired level 15-20. pending further recs on abx guidance from ID, would prefer long term IV abx followed by oral course  7. Will need PICC  8. pain control with po meds, holding nsaids, cont tylenol and tramadol/oxycodone prn, avoid nsaids in setting of kasia, pain currently well controlled  9. pt/ot - if stable okay for oob to beside chair  10. wound care, drain care per plastics team  11. nicole lund when able    Nate Bass MD  Attending Orthopedic Surgeon .

## 2018-03-26 NOTE — PROGRESS NOTE ADULT - SUBJECTIVE AND OBJECTIVE BOX
CC: f/u for infected right prosthetic knee    Patient reports her knee is sore    REVIEW OF SYSTEMS:  All other review of systems negative (Comprehensive ROS)    Antimicrobials Day #  :post op day 3  vancomycin  IVPB 1000 milliGRAM(s) IV Intermittent every 24 hours    Other Medications Reviewed    T(F): 98.9 (03-26-18 @ 08:57), Max: 99.7 (03-25-18 @ 19:30)  HR: 89 (03-26-18 @ 08:57)  BP: 105/48 (03-26-18 @ 08:57)  RR: 18 (03-26-18 @ 08:57)  SpO2: 95% (03-26-18 @ 08:57)  Wt(kg): --    PHYSICAL EXAM:  General: alert, no acute distress  Eyes:  anicteric, no conjunctival injection, no discharge  Oropharynx: no lesions or injection 	  Neck: supple, without adenopathy  Lungs: basilar rales to auscultation  Heart: s1s2 2/6 sys m  Abdomen: soft, nondistended, nontender, without mass or organomegaly  Skin: no lesions  Extremities:edema arms, right leg wrapped with drains  Neurologic: alert, oriented, moves all extremities    LAB RESULTS:                        8.0    6.9   )-----------( 122      ( 26 Mar 2018 04:47 )             23.0     03-26    137  |  107  |  22  ----------------------------<  96  4.4   |  19<L>  |  1.21    Ca    8.0<L>      26 Mar 2018 04:47          MICROBIOLOGY:  RECENT CULTURES:  03-24 @ 05:48 .Blood Blood     No growth to date.      03-24 @ 03:55 .Body Fluid Synovial Fluid-- rt knee     Rare Staphylococcus aureus    Few polymorphonuclear leukocytes seen per low power field  No organisms seen    03-24 @ 03:53 .Tissue Other, synovium #1     Rare Staphylococcus aureus    Few polymorphonuclear leukocytes seen per low power field  No organisms seen    03-24 @ 03:51 .Tissue Other, femoral canal     Rare Staphylococcus species    Numerous polymorphonuclear leukocytes seen per low power field  No organisms seen    03-24 @ 03:41 .Tissue Other, synovium #2     No growth    Few polymorphonuclear leukocytes seen per low power field  No organisms seen    03-24 @ 03:40 .Tissue Other, tibial canal     No growth    No polymorphonuclear cells seen per low power field  No organisms seen per oil power field        RADIOLOGY REVIEWED:    < from: Xray Knee 1 or 2 Views, Right (03.23.18 @ 18:26) >    IMPRESSION:   Interval postoperative changes of the knee with cement spacer and fusion   ruthann in place.    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   Plan:  continue iv vancomycin  await organism sensitivity  anticipate prolonged iv then po antibiotics  may add rifampin but risk with coumadin

## 2018-03-26 NOTE — PROGRESS NOTE ADULT - ASSESSMENT
A/P: 77F s/p R total knee insertion of spacer, irrigation and debridement, complex wound closure (POD 3)  - Pain control  - C/w dressings  - Prevena x5 days  -  care  - Care per primary team    [phone]

## 2018-03-27 LAB
-  AMPICILLIN/SULBACTAM: SIGNIFICANT CHANGE UP
-  CEFAZOLIN: SIGNIFICANT CHANGE UP
-  CIPROFLOXACIN: SIGNIFICANT CHANGE UP
-  CLINDAMYCIN: SIGNIFICANT CHANGE UP
-  DAPTOMYCIN: SIGNIFICANT CHANGE UP
-  ERYTHROMYCIN: SIGNIFICANT CHANGE UP
-  GENTAMICIN: SIGNIFICANT CHANGE UP
-  LEVOFLOXACIN: SIGNIFICANT CHANGE UP
-  LINEZOLID: SIGNIFICANT CHANGE UP
-  MOXIFLOXACIN(AEROBIC): SIGNIFICANT CHANGE UP
-  OXACILLIN: SIGNIFICANT CHANGE UP
-  PENICILLIN: SIGNIFICANT CHANGE UP
-  RIFAMPIN: SIGNIFICANT CHANGE UP
-  TETRACYCLINE: SIGNIFICANT CHANGE UP
-  TRIMETHOPRIM/SULFAMETHOXAZOLE: SIGNIFICANT CHANGE UP
-  VANCOMYCIN: SIGNIFICANT CHANGE UP
ANION GAP SERPL CALC-SCNC: 10 MMOL/L — SIGNIFICANT CHANGE UP (ref 5–17)
APTT BLD: 41.9 SEC — HIGH (ref 27.5–37.4)
BUN SERPL-MCNC: 17 MG/DL — SIGNIFICANT CHANGE UP (ref 7–23)
CALCIUM SERPL-MCNC: 8.3 MG/DL — LOW (ref 8.4–10.5)
CHLORIDE SERPL-SCNC: 106 MMOL/L — SIGNIFICANT CHANGE UP (ref 96–108)
CO2 SERPL-SCNC: 20 MMOL/L — LOW (ref 22–31)
CREAT SERPL-MCNC: 0.94 MG/DL — SIGNIFICANT CHANGE UP (ref 0.5–1.3)
GLUCOSE SERPL-MCNC: 104 MG/DL — HIGH (ref 70–99)
HCT VFR BLD CALC: 23.5 % — LOW (ref 34.5–45)
HCT VFR BLD CALC: 24.4 % — LOW (ref 34.5–45)
HGB BLD-MCNC: 7.6 G/DL — LOW (ref 11.5–15.5)
HGB BLD-MCNC: 8.3 G/DL — LOW (ref 11.5–15.5)
INR BLD: 1.3 RATIO — HIGH (ref 0.88–1.16)
MCHC RBC-ENTMCNC: 28.5 PG — SIGNIFICANT CHANGE UP (ref 27–34)
MCHC RBC-ENTMCNC: 29.7 PG — SIGNIFICANT CHANGE UP (ref 27–34)
MCHC RBC-ENTMCNC: 32.3 GM/DL — SIGNIFICANT CHANGE UP (ref 32–36)
MCHC RBC-ENTMCNC: 34 GM/DL — SIGNIFICANT CHANGE UP (ref 32–36)
MCV RBC AUTO: 87.4 FL — SIGNIFICANT CHANGE UP (ref 80–100)
MCV RBC AUTO: 88 FL — SIGNIFICANT CHANGE UP (ref 80–100)
METHOD TYPE: SIGNIFICANT CHANGE UP
NRBC # BLD: 0 /100 WBCS — SIGNIFICANT CHANGE UP (ref 0–0)
PLATELET # BLD AUTO: 138 K/UL — LOW (ref 150–400)
PLATELET # BLD AUTO: 140 K/UL — LOW (ref 150–400)
POTASSIUM SERPL-MCNC: 4.3 MMOL/L — SIGNIFICANT CHANGE UP (ref 3.5–5.3)
POTASSIUM SERPL-SCNC: 4.3 MMOL/L — SIGNIFICANT CHANGE UP (ref 3.5–5.3)
PROTHROM AB SERPL-ACNC: 14.3 SEC — HIGH (ref 9.8–12.7)
RBC # BLD: 2.67 M/UL — LOW (ref 3.8–5.2)
RBC # BLD: 2.8 M/UL — LOW (ref 3.8–5.2)
RBC # FLD: 15.7 % — HIGH (ref 10.3–14.5)
RBC # FLD: 17.7 % — HIGH (ref 10.3–14.5)
SODIUM SERPL-SCNC: 136 MMOL/L — SIGNIFICANT CHANGE UP (ref 135–145)
WBC # BLD: 6.26 K/UL — SIGNIFICANT CHANGE UP (ref 3.8–10.5)
WBC # BLD: 6.5 K/UL — SIGNIFICANT CHANGE UP (ref 3.8–10.5)
WBC # FLD AUTO: 6.26 K/UL — SIGNIFICANT CHANGE UP (ref 3.8–10.5)
WBC # FLD AUTO: 6.5 K/UL — SIGNIFICANT CHANGE UP (ref 3.8–10.5)

## 2018-03-27 PROCEDURE — 71045 X-RAY EXAM CHEST 1 VIEW: CPT | Mod: 26

## 2018-03-27 PROCEDURE — 99232 SBSQ HOSP IP/OBS MODERATE 35: CPT

## 2018-03-27 RX ORDER — ENOXAPARIN SODIUM 100 MG/ML
100 INJECTION SUBCUTANEOUS EVERY 12 HOURS
Qty: 0 | Refills: 0 | Status: DISCONTINUED | OUTPATIENT
Start: 2018-03-27 | End: 2018-03-27

## 2018-03-27 RX ORDER — WARFARIN SODIUM 2.5 MG/1
5 TABLET ORAL ONCE
Qty: 0 | Refills: 0 | Status: COMPLETED | OUTPATIENT
Start: 2018-03-27 | End: 2018-03-27

## 2018-03-27 RX ORDER — ENOXAPARIN SODIUM 100 MG/ML
120 INJECTION SUBCUTANEOUS EVERY 12 HOURS
Qty: 0 | Refills: 0 | Status: DISCONTINUED | OUTPATIENT
Start: 2018-03-27 | End: 2018-03-30

## 2018-03-27 RX ADMIN — OXYCODONE HYDROCHLORIDE 5 MILLIGRAM(S): 5 TABLET ORAL at 07:08

## 2018-03-27 RX ADMIN — WARFARIN SODIUM 5 MILLIGRAM(S): 2.5 TABLET ORAL at 21:33

## 2018-03-27 RX ADMIN — Medication 325 MILLIGRAM(S): at 12:41

## 2018-03-27 RX ADMIN — OXYCODONE HYDROCHLORIDE 5 MILLIGRAM(S): 5 TABLET ORAL at 07:35

## 2018-03-27 RX ADMIN — Medication 1 TABLET(S): at 13:17

## 2018-03-27 RX ADMIN — ENOXAPARIN SODIUM 120 MILLIGRAM(S): 100 INJECTION SUBCUTANEOUS at 17:57

## 2018-03-27 RX ADMIN — SODIUM CHLORIDE 3 MILLILITER(S): 9 INJECTION INTRAMUSCULAR; INTRAVENOUS; SUBCUTANEOUS at 21:27

## 2018-03-27 RX ADMIN — Medication 1 TABLET(S): at 12:42

## 2018-03-27 RX ADMIN — Medication 500 MILLIGRAM(S): at 17:52

## 2018-03-27 RX ADMIN — Medication 100 MILLIGRAM(S): at 21:33

## 2018-03-27 RX ADMIN — SODIUM CHLORIDE 3 MILLILITER(S): 9 INJECTION INTRAMUSCULAR; INTRAVENOUS; SUBCUTANEOUS at 05:37

## 2018-03-27 RX ADMIN — Medication 975 MILLIGRAM(S): at 06:00

## 2018-03-27 RX ADMIN — Medication 100 MILLIGRAM(S): at 05:36

## 2018-03-27 RX ADMIN — Medication 975 MILLIGRAM(S): at 12:41

## 2018-03-27 RX ADMIN — CYCLOBENZAPRINE HYDROCHLORIDE 5 MILLIGRAM(S): 10 TABLET, FILM COATED ORAL at 17:52

## 2018-03-27 RX ADMIN — BUDESONIDE AND FORMOTEROL FUMARATE DIHYDRATE 2 PUFF(S): 160; 4.5 AEROSOL RESPIRATORY (INHALATION) at 05:37

## 2018-03-27 RX ADMIN — Medication 500 MILLIGRAM(S): at 05:36

## 2018-03-27 RX ADMIN — Medication 325 MILLIGRAM(S): at 17:52

## 2018-03-27 RX ADMIN — SIMVASTATIN 20 MILLIGRAM(S): 20 TABLET, FILM COATED ORAL at 21:33

## 2018-03-27 RX ADMIN — POLYETHYLENE GLYCOL 3350 17 GRAM(S): 17 POWDER, FOR SOLUTION ORAL at 12:42

## 2018-03-27 RX ADMIN — OXYCODONE HYDROCHLORIDE 5 MILLIGRAM(S): 5 TABLET ORAL at 14:32

## 2018-03-27 RX ADMIN — Medication 100 MILLIGRAM(S): at 13:17

## 2018-03-27 RX ADMIN — BUDESONIDE AND FORMOTEROL FUMARATE DIHYDRATE 2 PUFF(S): 160; 4.5 AEROSOL RESPIRATORY (INHALATION) at 17:54

## 2018-03-27 RX ADMIN — Medication 1 TABLET(S): at 21:33

## 2018-03-27 RX ADMIN — PANTOPRAZOLE SODIUM 40 MILLIGRAM(S): 20 TABLET, DELAYED RELEASE ORAL at 12:42

## 2018-03-27 RX ADMIN — SODIUM CHLORIDE 50 MILLILITER(S): 9 INJECTION INTRAMUSCULAR; INTRAVENOUS; SUBCUTANEOUS at 13:07

## 2018-03-27 RX ADMIN — Medication 975 MILLIGRAM(S): at 13:10

## 2018-03-27 RX ADMIN — CYCLOBENZAPRINE HYDROCHLORIDE 5 MILLIGRAM(S): 10 TABLET, FILM COATED ORAL at 05:36

## 2018-03-27 RX ADMIN — DAPTOMYCIN 136 MILLIGRAM(S): 500 INJECTION, POWDER, LYOPHILIZED, FOR SOLUTION INTRAVENOUS at 13:07

## 2018-03-27 RX ADMIN — Medication 1 MILLIGRAM(S): at 12:41

## 2018-03-27 RX ADMIN — Medication 975 MILLIGRAM(S): at 05:36

## 2018-03-27 RX ADMIN — Medication 325 MILLIGRAM(S): at 09:00

## 2018-03-27 RX ADMIN — Medication 1 TABLET(S): at 05:36

## 2018-03-27 RX ADMIN — SODIUM CHLORIDE 3 MILLILITER(S): 9 INJECTION INTRAMUSCULAR; INTRAVENOUS; SUBCUTANEOUS at 13:14

## 2018-03-27 RX ADMIN — OXYCODONE HYDROCHLORIDE 5 MILLIGRAM(S): 5 TABLET ORAL at 15:00

## 2018-03-27 NOTE — PROGRESS NOTE ADULT - SUBJECTIVE AND OBJECTIVE BOX
Plastic Surgery Progress Note    S: Patient seen and examined.  1 unit PRBC yesterday.  did not get oob. febrile.    Vital Signs Last 24 Hrs  T(C): 37.2 (27 Mar 2018 04:28), Max: 38 (27 Mar 2018 00:07)  T(F): 98.9 (27 Mar 2018 04:28), Max: 100.4 (27 Mar 2018 00:07)  HR: 86 (27 Mar 2018 04:28) (79 - 89)  BP: 121/71 (27 Mar 2018 04:28) (104/45 - 121/71)  BP(mean): --  RR: 18 (27 Mar 2018 04:28) (17 - 18)  SpO2: 100% (27 Mar 2018 04:28) (95% - 100%)  I&O's Detail    26 Mar 2018 07:01  -  27 Mar 2018 07:00  --------------------------------------------------------  IN:    Oral Fluid: 560 mL    Packed Red Blood Cells: 300 mL    sodium chloride 0.9%.: 500 mL  Total IN: 1360 mL    OUT:    Drain: 7.5 mL    Indwelling Catheter - Urethral: 1300 mL  Total OUT: 1307.5 mL    Total NET: 52.5 mL          Physical Exam:  General: Awake and alert, NAD  R Leg: ace wrap and knee immobilizer in place, drain SS, vac in place

## 2018-03-27 NOTE — PROGRESS NOTE ADULT - ASSESSMENT
A/P: 77F s/p R total knee insertion of spacer, irrigation and debridement, complex wound closure (POD 4)  - Pain control  - C/w dressings  - Prevena x5 days  -  care  - follow-up fever work-up  - Care per primary team    [phone]

## 2018-03-27 NOTE — PROGRESS NOTE ADULT - SUBJECTIVE AND OBJECTIVE BOX
Patient seen and examined  has some back pain  denies any sob or cp    amoxicillin (Other)  IV Contrast (Flushing (Skin); Nausea)    Hospital Medications:   MEDICATIONS  (STANDING):  acetaminophen   Tablet. 975 milliGRAM(s) Oral every 8 hours  acetaminophen   Tablet. 1000 milliGRAM(s) Oral once  ascorbic acid 500 milliGRAM(s) Oral two times a day  buDESOnide 160 MICROgram(s)/formoterol 4.5 MICROgram(s) Inhaler 2 Puff(s) Inhalation two times a day  calcium carbonate 1250 mG + Vitamin D (OsCal 500 + D) 1 Tablet(s) Oral three times a day  cyclobenzaprine 5 milliGRAM(s) Oral two times a day  DAPTOmycin IVPB 900 milliGRAM(s) IV Intermittent every 24 hours  docusate sodium 100 milliGRAM(s) Oral three times a day  ferrous    sulfate 325 milliGRAM(s) Oral three times a day with meals  folic acid 1 milliGRAM(s) Oral daily  multivitamin 1 Tablet(s) Oral daily  pantoprazole    Tablet 40 milliGRAM(s) Oral daily  polyethylene glycol 3350 17 Gram(s) Oral daily  simvastatin 20 milliGRAM(s) Oral at bedtime  sodium chloride 0.9% lock flush 3 milliLiter(s) IV Push every 8 hours      VITALS:  T(F): 98.1 (03-27-18 @ 13:14), Max: 100.4 (03-27-18 @ 00:07)  HR: 84 (03-27-18 @ 13:14)  BP: 126/76 (03-27-18 @ 13:14)  RR: 18 (03-27-18 @ 13:14)  SpO2: 94% (03-27-18 @ 13:14)  Wt(kg): --    03-26 @ 07:01  -  03-27 @ 07:00  --------------------------------------------------------  IN: 1960 mL / OUT: 1307.5 mL / NET: 652.5 mL    03-27 @ 07:01  -  03-27 @ 13:57  --------------------------------------------------------  IN: 360 mL / OUT: 375 mL / NET: -15 mL    PHYSICAL EXAM:  Constitutional: NAD; pale  HEENT: anicteric sclera, oropharynx clear, MMM  Neck: No JVD  Respiratory:b/l  rhonchi  Cardiovascular: S1, S2, RRR  Gastrointestinal: BS+, soft, NT/ND  Extremities: 4+edema; right knee immobilizer  Neurological: A/O x 3, no focal deficits  Psychiatric: Normal mood, normal affect  : +indwelling lund.   Skin: No rashes    LABS:  03-27    136  |  106  |  17  ----------------------------<  104<H>  4.3   |  20<L>  |  0.94    Ca    8.3<L>      27 Mar 2018 05:31      Creatinine Trend: 0.94 <--, 1.21 <--, 1.91 <--, 1.32 <--, 0.84 <--                        8.3    6.5   )-----------( 140      ( 27 Mar 2018 05:31 )             24.4     Urine Studies:    Creatinine, Random Urine: 198 mg/dL (03-25 @ 10:17)  Osmolality, Random Urine: 355 mos/kg (03-25 @ 10:17)  Chloride, Random Urine: <35 mmol/L (03-25 @ 10:17)  Potassium, Random Urine: 55 mmol/L (03-25 @ 10:17)  Sodium, Random Urine: 28 mmol/L (03-25 @ 10:17)    RADIOLOGY & ADDITIONAL STUDIES:

## 2018-03-27 NOTE — PROGRESS NOTE ADULT - ASSESSMENT
POD #3    Clinically stable from the cardiac perspective.  Stable volume status.  Resume low dose beta blocker when able/ if BP tolerates ( ie metoprolol 12.5 BID)  Tolerating enoxaparin at this time.  Transition to warfarin when hemostasis assured/no further procedures planned.  On IV abx as per primary team.    Patient has history of LBBB  Telemetry: SR, "AIVR" but per EP previous events could be retrograde invasion by paced rhythm resulting in AV prolongation and V pacing (ie not harmful)  Monitor on telemetry today; possible D/C telemetry later today  Recommendations per EP.  Can d/c telemetry when no further new events    Will follow.    Roverto Roblero  Pager [phone] Clinically stable from the cardiac perspective.  Stable volume status.  Resume low dose beta blocker when able/ if BP tolerates ( ie metoprolol 12.5 BID)  Tolerating enoxaparin at this time.  Transition to warfarin when hemostasis assured/no further procedures planned.  On IV abx as per primary team.    Patient has history of LBBB  Telemetry: SR, "AIVR" but per EP previous events could be retrograde invasion by paced rhythm resulting in AV prolongation and V pacing (ie not harmful)  Monitor on telemetry today; possible D/C telemetry later today  Recommendations per EP.  Can d/c telemetry when no further new events    Will follow.    Roverto Roblero  Pager [phone]

## 2018-03-27 NOTE — PROGRESS NOTE ADULT - SUBJECTIVE AND OBJECTIVE BOX
Patient seen and examined.  1unit transfused yesterday with appropriate response  Patient had a one time temp of 100.4, medicine ordered blood cultures  Pain is controlled. No nausea, vomiting, chest pain, shortness of breath, lightheadedness, or dizziness.     Gen: NAD  RLE  Dressing clean, dry, intact in BJKI, PASCUAL in place  EHL/FHL/TA/GS intact  SILT DP/SP/Stone/Sa  WWP distally             Vital Signs Last 24 Hrs  T(C): 37.2 (27 Mar 2018 04:28), Max: 38 (27 Mar 2018 00:07)  T(F): 98.9 (27 Mar 2018 04:28), Max: 100.4 (27 Mar 2018 00:07)  HR: 86 (27 Mar 2018 04:28) (79 - 89)  BP: 121/71 (27 Mar 2018 04:28) (104/45 - 121/71)  BP(mean): --  RR: 18 (27 Mar 2018 04:28) (17 - 18)  SpO2: 100% (27 Mar 2018 04:28) (95% - 100%)    78 yo F s/p Removal of articulating abx cement spacer, Rev to Non articulating Spacer POD 4    Analgesia  FU ORCx, growing staph aureus  FU BCx (Rec)   FU ID regarding PICC this am vs waiting until repeat BCx negative  IV Dapto per ID  Avoid nephrotoxic meds  Holding Coumadin for now d/t supratherapeutic INR, holding Lovenox for possible PICC placement  Incentive spirometry  50% PWB In KI at all times, no knee ROM allowed   PT/OT/OOB  Will discuss with Dr. Bass

## 2018-03-27 NOTE — PROGRESS NOTE ADULT - ASSESSMENT
77 yr female PMH HTN HLD CAD s/p PCI Severe AV s/p TAVR SSS s/p PPM Polymyalgia Anxiety GERD chronic back pain presents R knee spacer exchange and I&D and complex wound closure, post op complicated YONATAN, hypotension and supratherapeutic INC and acute blood loss anemia. wound cultures positive in knee for MRSA.    Infected Knee - appreciate ID recs, cont dapto for 6-8 wks then minocycline, appreciate plastics, count wound care, appreciate ortho recs    Wide complex tachycardia - stable, appreciate cardio recs    YONATAN - resolved with IVF    acute blood loss anemia - likely post op blood loss, sp 3 units prbc total, CBC daily, maintain hgb >7.5    HTN HLD CAD s/p TAVR SSS s/p ppm - c/w current meds    DVT on coumadin - INR supratherpeutic sp 7.5mg PO vit K now INR 1.3, holding lovenox for PICC placement, resume lovenox and coumadin dosing after     Polymyalgia - steroids pain control PT as tolerated     Asthma - respiratory status stable duonebs prn     DVT PPX    plan of care dw patient and NP and aid at bedside

## 2018-03-27 NOTE — CHART NOTE - NSCHARTNOTEFT_GEN_A_CORE
Medicine NP episodic Note    Notified by RN, pt. with temperature of 38 (100.8) at 12MN.  Pt. seen and examined at bedside,  A+Ox3, in NAD.  Denies c/o CP, SOB, cough, chills, N/V/D, abdominal pain, or dysuria.       Vital Signs Last 24 Hrs  T(C): 37.2 (27 Mar 2018 04:28), Max: 38 (27 Mar 2018 00:07)  T(F): 98.9 (27 Mar 2018 04:28), Max: 100.4 (27 Mar 2018 00:07)  HR: 86 (27 Mar 2018 04:28) (79 - 89)  BP: 121/71 (27 Mar 2018 04:28) (104/45 - 121/71)  BP(mean): --  RR: 18 (27 Mar 2018 04:28) (17 - 18)  SpO2: 100% (27 Mar 2018 04:28) (95% - 100%)      Labs:                          8.3    6.5   )-----------( 140      ( 27 Mar 2018 05:31 )             24.4     03-27    136  |  106  |  17  ----------------------------<  104<H>  4.3   |  20<L>  |  0.94    Ca    8.3<L>      27 Mar 2018 05:31    Urinalysis (01.23.18 @ 13:21)    pH Urine: 5.5    Glucose Qualitative, Urine: Negative    Blood, Urine: Negative    Color: Yellow    Urine Appearance: SL Turbid    Bilirubin: Negative    Ketone - Urine: Negative    Specific Gravity: 1.019    Protein, Urine: 30 mg/dL    Urobilinogen: Negative    Nitrite: Negative    Leukocyte Esterase Concentration: Negative    Culture - Urine (03.25.18 @ 11:58)    Specimen Source: .Urine Catheterized    Culture Results:   No growth    Culture - Blood (03.24.18 @ 05:48)    Specimen Source: .Blood Blood    Culture Results:   No growth to date.    Culture - Tissue with Gram Stain (03.24.18 @ 03:53)    -  Vancomycin: S 1    -  Trimethoprim/Sulfamethoxazole: S <=0.5/9.5    -  Tetra/Doxy: S 2    -  RIF- Rifampin: S <=1    -  Penicillin: R >8    -  Oxacillin: R >2    -  Moxifloxacin(Aerobic): R >4    -  Linezolid: S 2    -  Levofloxacin: R >4    -  Erythromycin: R >4    -  Gentamicin: S 2    -  Daptomycin: S 0.5    -  Cefazolin: R >16    -  Clindamycin: R >4    -  Ampicillin/Sulbactam: R 16/8    -  Ciprofloxacin: R >2    Gram Stain:   Few polymorphonuclear leukocytes seen per low power field  No organisms seen    Specimen Source: .Tissue Other, synovium #1    Culture Results:   Rare Methicillin resistant Staphylococcus aureus    Organism Identification: Methicillin resistant Staphylococcus aureus    Organism: Methicillin resistant Staphylococcus aureus    Method Type: EDWARD      Radiology:  < from: Xray Chest 1 View- PORTABLE-Routine (03.26.18 @ 13:06) >  IMPRESSION: Left lower lung subsegmental atelectasis.      MEDICATIONS  (STANDING):  acetaminophen   Tablet. 975 milliGRAM(s) Oral every 8 hours  acetaminophen   Tablet. 1000 milliGRAM(s) Oral once  ascorbic acid 500 milliGRAM(s) Oral two times a day  buDESOnide 160 MICROgram(s)/formoterol 4.5 MICROgram(s) Inhaler 2 Puff(s) Inhalation two times a day  calcium carbonate 1250 mG + Vitamin D (OsCal 500 + D) 1 Tablet(s) Oral three times a day  cyclobenzaprine 5 milliGRAM(s) Oral two times a day  DAPTOmycin IVPB 900 milliGRAM(s) IV Intermittent every 24 hours  docusate sodium 100 milliGRAM(s) Oral three times a day  ferrous    sulfate 325 milliGRAM(s) Oral three times a day with meals  folic acid 1 milliGRAM(s) Oral daily  multivitamin 1 Tablet(s) Oral daily  pantoprazole    Tablet 40 milliGRAM(s) Oral daily  polyethylene glycol 3350 17 Gram(s) Oral daily  simvastatin 20 milliGRAM(s) Oral at bedtime  sodium chloride 0.9% lock flush 3 milliLiter(s) IV Push every 8 hours  sodium chloride 0.9%. 1000 milliLiter(s) (50 mL/Hr) IV Continuous <Continuous>    MEDICATIONS  (PRN):  acetaminophen   Tablet 650 milliGRAM(s) Oral every 6 hours PRN For Temp greater than 38 C (100.4 F)  ALPRAZolam 0.25 milliGRAM(s) Oral daily PRN anxiety  aluminum hydroxide/magnesium hydroxide/simethicone Suspension 30 milliLiter(s) Oral four times a day PRN Indigestion  benzocaine 15 mG/menthol 3.6 mG Lozenge 1 Lozenge Oral every 4 hours PRN Sore Throat  bisacodyl Suppository 10 milliGRAM(s) Rectal daily PRN If no bowel movement by postoperative day #2  magnesium hydroxide Suspension 30 milliLiter(s) Oral daily PRN Constipation  ondansetron Injectable 4 milliGRAM(s) IV Push every 6 hours PRN Nausea and/or Vomiting  oxyCODONE    IR 5 milliGRAM(s) Oral every 4 hours PRN Moderate Pain (4 - 6)  oxyCODONE    IR 10 milliGRAM(s) Oral every 4 hours PRN Severe Pain (7 - 10)  prochlorperazine   Injectable 5 milliGRAM(s) IV Push once PRN Nausea and/or Vomiting  senna 2 Tablet(s) Oral at bedtime PRN Constipation  zolpidem 5 milliGRAM(s) Oral at bedtime PRN Insomnia      Physical Exam:  General: WN/WD NAD  Neurology: A&Ox3  Respiratory: Diminished bilat   CV: RRR, S1S2  Abdominal: Obese, soft, NT, ND   MSK: No edema, + peripheral pulses, RLE with ace bandage intact, + PASCUAL drain     Assessment & Plan:  HPI:  76 y/o F w/ PMHx of HTN, HLD, CAD s/p PCI, Severe AV s/p TAVR, SSS s/p PPM, Polymyalgia, Anxiety, GERD, chronic back pain presents for knee revision of infected joint, s/p R knee spacer exchange and I&D and complex wound closure with growth of staph aureus.  Now febrile, temp 38.     # Fever  > Will obtain blood cultures x 2  > Tylenol PO and cooling measures for pyrexia   > Continue Daptomycin IVPB for MRSA prosthetic knee   > Encourage incentive spirometry use   > Cough & deep breathing   > F/u am labs  > Monitor vital signs  > Continue IV hydration     Will endorse to primary team in am.  Attending to follow.     Tahmina Tadeo, [facility]  [phone]

## 2018-03-27 NOTE — PROGRESS NOTE ADULT - ATTENDING COMMENTS
I agree with the above note and have personally seen and examined this patient. All pertinent films have been reviewed. Please refer to clinical documentation of the history, physical examinations, data summary, and both assessment and plan as documented above and with which I agree.    patient is s/p R knee spacer exchange and I&D and complex wound closure. she has kasia postop as well as hypotension and supratherapeutic inr. wound cultures positive in knee for MRSA    plan  1. hospitalist- appreciate their primary management on this medically complex patient.   2. INR goal 2-3, lovenox bridge to coumadin  3. has acute blood loss anemia secondary to surgery as well as dilution response from IVF, now resolved and hct stable, trend cbc  4. cards - Dr. Roblero following, he spoke with EP who stated that abnormal tele results may be normal 2/2 ventricular paced ppm  5. renal - patient has ksaia on ckd in setting of high dose antibiotic spacer, iv vanc, toradol, and lovenox. now resolved  6. id- on dapto for MRSA, PICC placed  7. pain control with po meds, holding nsaids, cont tylenol and tramadol/oxycodone prn, avoid nsaids in setting of kasia, pain currently well controlled  8. pt/ot - if stable okay for oob to beside chair, no knee ROM, PWB RLE  9. wound care, drain care per plastics team  10. dispo planning to snf when bed available    Nate Bass MD  Attending Orthopedic Surgeon . I agree with the above note and have personally seen and examined this patient. All pertinent films have been reviewed. Please refer to clinical documentation of the history, physical examinations, data summary, and both assessment and plan as documented above and with which I agree.    patient is s/p R knee spacer exchange and I&D and complex wound closure. she has kasia postop as well as hypotension and supratherapeutic inr. wound cultures positive in knee for MRSA    plan  1. hospitalist- appreciate their primary management on this medically complex patient.   2. INR goal 2-3, lovenox bridge to coumadin  3. has acute blood loss anemia secondary to surgery as well as dilution response from IVF, now resolved and hct stable, trend cbc  4. cards - Dr. Roblero following, he spoke with EP who stated that abnormal tele results may be normal 2/2 ventricular paced ppm  5. renal - patient has kasia on ckd in setting of high dose antibiotic spacer, iv vanc, toradol, and lovenox. now resolved, lund still in place, consider dc lund when able  6. id- on dapto for MRSA, PICC placed  7. pain control with po meds, holding nsaids, cont tylenol and tramadol/oxycodone prn, avoid nsaids in setting of kasia, pain currently well controlled  8. pt/ot - if stable okay for oob to beside chair, no knee ROM, PWB RLE  9. wound care, drain care per plastics team  10. dispo planning to snf when bed available    Nate Bass MD  Attending Orthopedic Surgeon .

## 2018-03-27 NOTE — PROGRESS NOTE ADULT - SUBJECTIVE AND OBJECTIVE BOX
CC: f/u for infected right tkr    Patient reports she has sciatic nerve pain    REVIEW OF SYSTEMS:  All other review of systems negative (Comprehensive ROS)    Antimicrobials Day #  :post op day 3/42  DAPTOmycin IVPB 900 milliGRAM(s) IV Intermittent every 24 hours    Other Medications Reviewed    T(F): 98.1 (03-27-18 @ 13:14), Max: 100.4 (03-27-18 @ 00:07)  HR: 84 (03-27-18 @ 13:14)  BP: 126/76 (03-27-18 @ 13:14)  RR: 18 (03-27-18 @ 13:14)  SpO2: 94% (03-27-18 @ 13:14)  Wt(kg): --    PHYSICAL EXAM:  General: alert, in some acute distress  Eyes:  anicteric, no conjunctival injection, no discharge  Oropharynx: no lesions or injection 	  Neck: supple, without adenopathy  Lungs: rales to auscultation  Heart: regular rate and rhythm; no murmur, rubs or gallops  Abdomen: soft, nondistended, nontender, without mass or organomegaly  Skin: no lesions  Extremities: no clubbing, cyanosis,. legs with edema, right knee dressed with drain and vac  Neurologic: alert, oriented, moves all extremities    LAB RESULTS:                        8.3    6.5   )-----------( 140      ( 27 Mar 2018 05:31 )             24.4     03-27    136  |  106  |  17  ----------------------------<  104<H>  4.3   |  20<L>  |  0.94    Ca    8.3<L>      27 Mar 2018 05:31          MICROBIOLOGY:  RECENT CULTURES:  03-25 @ 11:58 .Urine Catheterized     No growth      03-24 @ 05:48 .Blood Blood     No growth to date.      03-24 @ 03:55 .Body Fluid Synovial Fluid-- rt knee Methicillin resistant Staphylococcus aureus    Rare Methicillin resistant Staphylococcus aureus    Few polymorphonuclear leukocytes seen per low power field  No organisms seen    03-24 @ 03:53 .Tissue Other, synovium #1 Methicillin resistant Staphylococcus aureus    Rare Methicillin resistant Staphylococcus aureus    Few polymorphonuclear leukocytes seen per low power field  No organisms seen    03-24 @ 03:51 .Tissue Other, femoral canal Methicillin resistant Staphylococcus aureus    Rare Methicillin resistant Staphylococcus aureus    Numerous polymorphonuclear leukocytes seen per low power field  No organisms seen    03-24 @ 03:41 .Tissue Other, synovium #2 Methicillin resistant Staphylococcus aureus    Growth in fluid media only Staphylococcus aureus    Few polymorphonuclear leukocytes seen per low power field  No organisms seen    03-24 @ 03:40 .Tissue Other, tibial canal     No growth    No polymorphonuclear cells seen per low power field  No organisms seen per oil power field        RADIOLOGY REVIEWED:      < from: Xray Chest 1 View- PORTABLE-Routine (03.26.18 @ 13:06) >  IMPRESSION: Left lower lung subsegmental atelectasis.      < end of copied text >          Assessment:  Patient had strept species tkr infection s/p rudy and spacer and extended antibiotics but had poor wound healing so now had spacer change and fusion ruthann placed and grew mrsa this time. Kidneys are tenuous and one sample had high vanco elier so now on dapto  Plan: 6 weeks of daptomycin then suppressive oral minocycline  local care to knee per ortho  can place picc  monitor cpk

## 2018-03-27 NOTE — PROGRESS NOTE ADULT - SUBJECTIVE AND OBJECTIVE BOX
Patient is a 77y old  Female who presents with a chief complaint of "Here for surgery on my right leg" (09 Mar 2018 09:48)      SUBJECTIVE / OVERNIGHT EVENTS:    Patient seen and examined. denies cp sob. co fatigue. denies pain.      Vital Signs Last 24 Hrs  T(C): 37.2 (27 Mar 2018 04:28), Max: 38 (27 Mar 2018 00:07)  T(F): 98.9 (27 Mar 2018 04:28), Max: 100.4 (27 Mar 2018 00:07)  HR: 86 (27 Mar 2018 04:28) (79 - 89)  BP: 121/71 (27 Mar 2018 04:28) (104/45 - 121/71)  BP(mean): --  RR: 18 (27 Mar 2018 04:28) (17 - 18)  SpO2: 100% (27 Mar 2018 04:28) (95% - 100%)  I&O's Summary    26 Mar 2018 07:01  -  27 Mar 2018 07:00  --------------------------------------------------------  IN: 1960 mL / OUT: 1307.5 mL / NET: 652.5 mL    27 Mar 2018 07:01  -  27 Mar 2018 10:56  --------------------------------------------------------  IN: 120 mL / OUT: 0 mL / NET: 120 mL        PE:  GENERAL: NAD, AAOx3, obese  HEAD:  Atraumatic, Normocephalic  EYES: EOMI, PERRLA, conjunctiva and sclera clear  NECK: Supple, No JVD  CHEST/LUNG: CTABL, No wheeze  HEART: Regular rate and rhythm; no murmur  ABDOMEN: Soft, Nontender, Nondistended; Bowel sounds present  EXTREMITIES:  2+ Peripheral Pulses, right leg in boot, wound vac, dressed, right kneeJP with bloody drainage  SKIN: No rashes or lesions  NEURO: No focal deficits    LABS:                        8.3    6.5   )-----------( 140      ( 27 Mar 2018 05:31 )             24.4     03-27    136  |  106  |  17  ----------------------------<  104<H>  4.3   |  20<L>  |  0.94    Ca    8.3<L>      27 Mar 2018 05:31      PT/INR - ( 27 Mar 2018 05:31 )   PT: 14.3 sec;   INR: 1.30 ratio         PTT - ( 27 Mar 2018 05:31 )  PTT:41.9 sec  CAPILLARY BLOOD GLUCOSE                RADIOLOGY & ADDITIONAL TESTS:    Imaging Personally Reviewed:  [x] YES  [ ] NO    Consultant(s) Notes Reviewed:  [x] YES  [ ] NO    MEDICATIONS  (STANDING):  acetaminophen   Tablet. 975 milliGRAM(s) Oral every 8 hours  acetaminophen   Tablet. 1000 milliGRAM(s) Oral once  ascorbic acid 500 milliGRAM(s) Oral two times a day  buDESOnide 160 MICROgram(s)/formoterol 4.5 MICROgram(s) Inhaler 2 Puff(s) Inhalation two times a day  calcium carbonate 1250 mG + Vitamin D (OsCal 500 + D) 1 Tablet(s) Oral three times a day  cyclobenzaprine 5 milliGRAM(s) Oral two times a day  DAPTOmycin IVPB 900 milliGRAM(s) IV Intermittent every 24 hours  docusate sodium 100 milliGRAM(s) Oral three times a day  ferrous    sulfate 325 milliGRAM(s) Oral three times a day with meals  folic acid 1 milliGRAM(s) Oral daily  multivitamin 1 Tablet(s) Oral daily  pantoprazole    Tablet 40 milliGRAM(s) Oral daily  polyethylene glycol 3350 17 Gram(s) Oral daily  simvastatin 20 milliGRAM(s) Oral at bedtime  sodium chloride 0.9% lock flush 3 milliLiter(s) IV Push every 8 hours  sodium chloride 0.9%. 1000 milliLiter(s) (50 mL/Hr) IV Continuous <Continuous>    MEDICATIONS  (PRN):  acetaminophen   Tablet 650 milliGRAM(s) Oral every 6 hours PRN For Temp greater than 38 C (100.4 F)  ALPRAZolam 0.25 milliGRAM(s) Oral daily PRN anxiety  aluminum hydroxide/magnesium hydroxide/simethicone Suspension 30 milliLiter(s) Oral four times a day PRN Indigestion  benzocaine 15 mG/menthol 3.6 mG Lozenge 1 Lozenge Oral every 4 hours PRN Sore Throat  bisacodyl Suppository 10 milliGRAM(s) Rectal daily PRN If no bowel movement by postoperative day #2  magnesium hydroxide Suspension 30 milliLiter(s) Oral daily PRN Constipation  ondansetron Injectable 4 milliGRAM(s) IV Push every 6 hours PRN Nausea and/or Vomiting  oxyCODONE    IR 5 milliGRAM(s) Oral every 4 hours PRN Moderate Pain (4 - 6)  oxyCODONE    IR 10 milliGRAM(s) Oral every 4 hours PRN Severe Pain (7 - 10)  prochlorperazine   Injectable 5 milliGRAM(s) IV Push once PRN Nausea and/or Vomiting  senna 2 Tablet(s) Oral at bedtime PRN Constipation  zolpidem 5 milliGRAM(s) Oral at bedtime PRN Insomnia      Care Discussed with Consultants/Other Providers [x] YES  [ ] NO    HEALTH ISSUES - PROBLEM Dx:  Anemia due to blood loss: Anemia due to blood loss  Acute kidney injury superimposed on chronic kidney disease: Acute kidney injury superimposed on chronic kidney disease  DVT (deep venous thrombosis): DVT (deep venous thrombosis)  Total knee replacement status, right: Total knee replacement status, right  Pacemaker: Pacemaker  Asthma: Asthma

## 2018-03-27 NOTE — PROGRESS NOTE ADULT - SUBJECTIVE AND OBJECTIVE BOX
Cardiology/Vascular Medicine Inpatient Progress Note    POD #4 rt total knee spacer exchange  Patient with known history of LBBB, SSS s/p PPM  Telemetry: currently SR without events overnight  "AIVR" noted, but per EP previous events could be retrograde invasion by paced rhythm; telemetry/device interrogation reviewed with EP  No further new events noted on telemetry    Vital Signs Last 24 Hrs  T(C): 36.7 (27 Mar 2018 13:14), Max: 38 (27 Mar 2018 00:07)  T(F): 98.1 (27 Mar 2018 13:14), Max: 100.4 (27 Mar 2018 00:07)  HR: 84 (27 Mar 2018 13:14) (79 - 89)  BP: 126/76 (27 Mar 2018 13:14) (104/45 - 126/76)  BP(mean): --  RR: 18 (27 Mar 2018 13:14) (17 - 18)  SpO2: 94% (27 Mar 2018 13:14) (94% - 100%)    Appearance: NAD, chronically ill appearing, obese  HEENT:   Normal oral mucosa, PERRL, EOMI	  Lymphatic: No lymphadenopathy  Cardiovascular: Normal S1 S2, No JVD, + LE edema  Respiratory: Decreased BS b/l bases	  Psychiatry: Awake, alert  Gastrointestinal:  Soft, Non-tender, + BS	  Skin: No rashes, No ecchymoses, No cyanosis	  Neurologic: Non-focal  Extremities: +Rt knee brace, drainage in place  Vascular: Peripheral pulses palpable 2+ bilaterally    MEDICATIONS  (STANDING):  acetaminophen   Tablet. 975 milliGRAM(s) Oral every 8 hours  acetaminophen   Tablet. 1000 milliGRAM(s) Oral once  ascorbic acid 500 milliGRAM(s) Oral two times a day  buDESOnide 160 MICROgram(s)/formoterol 4.5 MICROgram(s) Inhaler 2 Puff(s) Inhalation two times a day  calcium carbonate 1250 mG + Vitamin D (OsCal 500 + D) 1 Tablet(s) Oral three times a day  cyclobenzaprine 5 milliGRAM(s) Oral two times a day  DAPTOmycin IVPB 900 milliGRAM(s) IV Intermittent every 24 hours  docusate sodium 100 milliGRAM(s) Oral three times a day  ferrous    sulfate 325 milliGRAM(s) Oral three times a day with meals  folic acid 1 milliGRAM(s) Oral daily  multivitamin 1 Tablet(s) Oral daily  pantoprazole    Tablet 40 milliGRAM(s) Oral daily  polyethylene glycol 3350 17 Gram(s) Oral daily  simvastatin 20 milliGRAM(s) Oral at bedtime  sodium chloride 0.9% lock flush 3 milliLiter(s) IV Push every 8 hours    MEDICATIONS  (PRN):  acetaminophen   Tablet 650 milliGRAM(s) Oral every 6 hours PRN For Temp greater than 38 C (100.4 F)  ALPRAZolam 0.25 milliGRAM(s) Oral daily PRN anxiety  aluminum hydroxide/magnesium hydroxide/simethicone Suspension 30 milliLiter(s) Oral four times a day PRN Indigestion  benzocaine 15 mG/menthol 3.6 mG Lozenge 1 Lozenge Oral every 4 hours PRN Sore Throat  bisacodyl Suppository 10 milliGRAM(s) Rectal daily PRN If no bowel movement by postoperative day #2  magnesium hydroxide Suspension 30 milliLiter(s) Oral daily PRN Constipation  ondansetron Injectable 4 milliGRAM(s) IV Push every 6 hours PRN Nausea and/or Vomiting  oxyCODONE    IR 5 milliGRAM(s) Oral every 4 hours PRN Moderate Pain (4 - 6)  oxyCODONE    IR 10 milliGRAM(s) Oral every 4 hours PRN Severe Pain (7 - 10)  prochlorperazine   Injectable 5 milliGRAM(s) IV Push once PRN Nausea and/or Vomiting  senna 2 Tablet(s) Oral at bedtime PRN Constipation  zolpidem 5 milliGRAM(s) Oral at bedtime PRN Insomnia      LABS:	 	                                       8.3    6.5   )-----------( 140      ( 27 Mar 2018 05:31 )             24.4     03-27    136  |  106  |  17  ----------------------------<  104<H>  4.3   |  20<L>  |  0.94    Ca    8.3<L>      27 Mar 2018 05:31    PT/INR - ( 27 Mar 2018 05:31 )   PT: 14.3 sec;   INR: 1.30 ratio    PTT - ( 27 Mar 2018 05:31 )  PTT:41.9 sec    < from: Limited Transthoracic Echo (12.27.17 @ 14:06) >    Patient name: IRASEMA KOHLER  YOB: 1940   Age: 77 (F)   MR#: [id number]  Study Date: 12/27/2017  Location: [id number]: Lauren Finkelstein  Study quality: Limited  Referring Physician: Sammy Galvan MD / Garo Sorto MD / Roverto Roblero MD  Blood Pressure: 139/52 mmHg  Height: 157 cm  Weight: 131 kg  BSA: 2.2 m2  ------------------------------------------------------------------------  PROCEDURE: Limited transthoracic echocardiogram with 2-D.  M-Mode and spectral and color flow Doppler.  INDICATION: Endocarditis, valve unspecified (I38)  ------------------------------------------------------------------------  OBSERVATIONS:  Mitral Valve: Mitral annular calcification and calcified  mitral leaflets with decreased diastolic opening. Peak  mitral valve gradient equals 14 mm Hg, mean transmitral  valve gradient equals 6 mm Hg, consistent with moderate  mitral stenosis.  Aortic Root: Normal aortic root, aortic arch and descending  thoracic aorta.  Aortic Valve:Bioprosthetic aortic valve replacement. s/p  TAVR.  Left Atrium: Normal left atrium.  Left Ventricle: Normal left ventricular systolic function.  No segmental wall motion abnormalities. Normal left  ventricular internal dimensions and wall thicknesses.  Right Heart: Normal right atrium. Normal right ventricular  size and function. Normal tricuspid valve. Normal pulmonic  valve.  Pericardium/PleuraNormal pericardium with no pericardial  effusion.  ------------------------------------------------------------------------  CONCLUSIONS:  1. Mitral annular calcification and calcified mitral  leaflets with decreased diastolic opening. Peak mitral  valve gradient equals 14 mm Hg, mean transmitral valve  gradient equals 6 mm Hg, consistent with moderate mitral  stenosis.  2. Bioprosthetic aortic valve replacement. s/p TAVR.  3. Normal left ventricular systolic function. No segmental  wall motion abnormalities.  *** Compared with echocardiogram of 12/19/2017, no  significant changes noted. There isno obvious evidence of  endocarditis.  ------------------------------------------------------------------------  Confirmed on  12/27/2017 - 17:13:04 by Filipe Esparza M.D.  ------------------------------------------------------------------------    < end of copied text >      < from: Xray Knee 1 or 2 Views, Right (03.23.18 @ 18:26) >    EXAM:  KNEE; 1 OR 2 VIEWS - RIGHT                            PROCEDURE DATE:  03/23/2018            INTERPRETATION:  INDICATION: post-op. Pain    COMPARISON: Knee radiographs dated 1/25/2018    FINDING: Two views of the knee demonstrates interval placement of an   intramedullary ruthann bridging the distal femur and proximal tibia. There is   cement material within the knee joint space. The findings are compatible   with arthrodesis and cement spacer placement. There are surgical clips,   soft tissue swelling and draining keeping with recent surgery.    IMPRESSION:   Interval postoperative changes of the knee with cement spacer and fusion   ruthann in place.        AARTI DIEGO M.D., ATTENDING RADIOLOGIST  This document has been electronically signed. Mar 24 2018 12:15AM        < end of copied text >      < from: Xray Chest 1 View- PORTABLE-Routine (03.26.18 @ 13:06) >  EXAM:  XR CHEST PORTABLE ROUTINE 1V                            PROCEDURE DATE:  03/26/2018            INTERPRETATION:  CLINICAL INDICATION: Shortness of breath. Rales.    TECHNIQUE: Single AP view of the chest was obtained on 3/26/2018.    COMPARISON: 3/25/2018.    FINDINGS: Elevation of the right hemidiaphragm. Left lower lung linear   opacity compatible with atelectasis. The heart size is normal. The bones   are intact.  Left chest pacemaker. Status post TAVR.    IMPRESSION: Left lower lung subsegmental atelectasis.        BRADY BANERJEE M.D., RADIOLOGY RESIDENT  This document has been electronically signed.  FELIPE TEMPLETON M.D., ATTENDING RADIOLOGIST  This document has been electronically signed. Mar 26 2018  3:56PM                < end of copied text > Cardiology/Vascular Medicine Inpatient Progress Note    POD #4 rt total knee spacer exchange  Patient with known history of LBBB, SSS s/p PPM  Telemetry: currently SR without events overnight  "AIVR" noted, but per EP previous events could be retrograde invasion by paced rhythm; telemetry/device interrogation reviewed with EP  No further new events noted on telemetry  Plan for PICC line for IV abx  Rehab placement planning    Vital Signs Last 24 Hrs  T(C): 36.7 (27 Mar 2018 13:14), Max: 38 (27 Mar 2018 00:07)  T(F): 98.1 (27 Mar 2018 13:14), Max: 100.4 (27 Mar 2018 00:07)  HR: 84 (27 Mar 2018 13:14) (79 - 89)  BP: 126/76 (27 Mar 2018 13:14) (104/45 - 126/76)  BP(mean): --  RR: 18 (27 Mar 2018 13:14) (17 - 18)  SpO2: 94% (27 Mar 2018 13:14) (94% - 100%)    Appearance: NAD, chronically ill appearing, obese  HEENT:   Normal oral mucosa, PERRL, EOMI	  Lymphatic: No lymphadenopathy  Cardiovascular: Normal S1 S2, No JVD, + LE edema  Respiratory: Decreased BS b/l bases	  Psychiatry: Awake, alert  Gastrointestinal:  Soft, Non-tender, + BS	  Skin: No rashes, No ecchymoses, No cyanosis	  Neurologic: Non-focal  Extremities: +Rt knee brace, drainage in place  Vascular: Peripheral pulses palpable 2+ bilaterally    MEDICATIONS  (STANDING):  acetaminophen   Tablet. 975 milliGRAM(s) Oral every 8 hours  acetaminophen   Tablet. 1000 milliGRAM(s) Oral once  ascorbic acid 500 milliGRAM(s) Oral two times a day  buDESOnide 160 MICROgram(s)/formoterol 4.5 MICROgram(s) Inhaler 2 Puff(s) Inhalation two times a day  calcium carbonate 1250 mG + Vitamin D (OsCal 500 + D) 1 Tablet(s) Oral three times a day  cyclobenzaprine 5 milliGRAM(s) Oral two times a day  DAPTOmycin IVPB 900 milliGRAM(s) IV Intermittent every 24 hours  docusate sodium 100 milliGRAM(s) Oral three times a day  ferrous    sulfate 325 milliGRAM(s) Oral three times a day with meals  folic acid 1 milliGRAM(s) Oral daily  multivitamin 1 Tablet(s) Oral daily  pantoprazole    Tablet 40 milliGRAM(s) Oral daily  polyethylene glycol 3350 17 Gram(s) Oral daily  simvastatin 20 milliGRAM(s) Oral at bedtime  sodium chloride 0.9% lock flush 3 milliLiter(s) IV Push every 8 hours    MEDICATIONS  (PRN):  acetaminophen   Tablet 650 milliGRAM(s) Oral every 6 hours PRN For Temp greater than 38 C (100.4 F)  ALPRAZolam 0.25 milliGRAM(s) Oral daily PRN anxiety  aluminum hydroxide/magnesium hydroxide/simethicone Suspension 30 milliLiter(s) Oral four times a day PRN Indigestion  benzocaine 15 mG/menthol 3.6 mG Lozenge 1 Lozenge Oral every 4 hours PRN Sore Throat  bisacodyl Suppository 10 milliGRAM(s) Rectal daily PRN If no bowel movement by postoperative day #2  magnesium hydroxide Suspension 30 milliLiter(s) Oral daily PRN Constipation  ondansetron Injectable 4 milliGRAM(s) IV Push every 6 hours PRN Nausea and/or Vomiting  oxyCODONE    IR 5 milliGRAM(s) Oral every 4 hours PRN Moderate Pain (4 - 6)  oxyCODONE    IR 10 milliGRAM(s) Oral every 4 hours PRN Severe Pain (7 - 10)  prochlorperazine   Injectable 5 milliGRAM(s) IV Push once PRN Nausea and/or Vomiting  senna 2 Tablet(s) Oral at bedtime PRN Constipation  zolpidem 5 milliGRAM(s) Oral at bedtime PRN Insomnia      LABS:	 	                                       8.3    6.5   )-----------( 140      ( 27 Mar 2018 05:31 )             24.4     03-27    136  |  106  |  17  ----------------------------<  104<H>  4.3   |  20<L>  |  0.94    Ca    8.3<L>      27 Mar 2018 05:31    PT/INR - ( 27 Mar 2018 05:31 )   PT: 14.3 sec;   INR: 1.30 ratio    PTT - ( 27 Mar 2018 05:31 )  PTT:41.9 sec    < from: Limited Transthoracic Echo (12.27.17 @ 14:06) >    Patient name: IRASEMA KOHLER  YOB: 1940   Age: 77 (F)   MR#: [id number]  Study Date: 12/27/2017  Location: [id number]: Lauren Finkelstein  Study quality: Limited  Referring Physician: Sammy Galvan MD / Garo Sorto MD / Roverto Roblero MD  Blood Pressure: 139/52 mmHg  Height: 157 cm  Weight: 131 kg  BSA: 2.2 m2  ------------------------------------------------------------------------  PROCEDURE: Limited transthoracic echocardiogram with 2-D.  M-Mode and spectral and color flow Doppler.  INDICATION: Endocarditis, valve unspecified (I38)  ------------------------------------------------------------------------  OBSERVATIONS:  Mitral Valve: Mitral annular calcification and calcified  mitral leaflets with decreased diastolic opening. Peak  mitral valve gradient equals 14 mm Hg, mean transmitral  valve gradient equals 6 mm Hg, consistent with moderate  mitral stenosis.  Aortic Root: Normal aortic root, aortic arch and descending  thoracic aorta.  Aortic Valve:Bioprosthetic aortic valve replacement. s/p  TAVR.  Left Atrium: Normal left atrium.  Left Ventricle: Normal left ventricular systolic function.  No segmental wall motion abnormalities. Normal left  ventricular internal dimensions and wall thicknesses.  Right Heart: Normal right atrium. Normal right ventricular  size and function. Normal tricuspid valve. Normal pulmonic  valve.  Pericardium/PleuraNormal pericardium with no pericardial  effusion.  ------------------------------------------------------------------------  CONCLUSIONS:  1. Mitral annular calcification and calcified mitral  leaflets with decreased diastolic opening. Peak mitral  valve gradient equals 14 mm Hg, mean transmitral valve  gradient equals 6 mm Hg, consistent with moderate mitral  stenosis.  2. Bioprosthetic aortic valve replacement. s/p TAVR.  3. Normal left ventricular systolic function. No segmental  wall motion abnormalities.  *** Compared with echocardiogram of 12/19/2017, no  significant changes noted. There isno obvious evidence of  endocarditis.  ------------------------------------------------------------------------  Confirmed on  12/27/2017 - 17:13:04 by Filipe Esparza M.D.  ------------------------------------------------------------------------    < end of copied text >      < from: Xray Knee 1 or 2 Views, Right (03.23.18 @ 18:26) >    EXAM:  KNEE; 1 OR 2 VIEWS - RIGHT                            PROCEDURE DATE:  03/23/2018            INTERPRETATION:  INDICATION: post-op. Pain    COMPARISON: Knee radiographs dated 1/25/2018    FINDING: Two views of the knee demonstrates interval placement of an   intramedullary ruthann bridging the distal femur and proximal tibia. There is   cement material within the knee joint space. The findings are compatible   with arthrodesis and cement spacer placement. There are surgical clips,   soft tissue swelling and draining keeping with recent surgery.    IMPRESSION:   Interval postoperative changes of the knee with cement spacer and fusion   ruthann in place.        AARTI DIEGO M.D., ATTENDING RADIOLOGIST  This document has been electronically signed. Mar 24 2018 12:15AM        < end of copied text >      < from: Xray Chest 1 View- PORTABLE-Routine (03.26.18 @ 13:06) >  EXAM:  XR CHEST PORTABLE ROUTINE 1V                            PROCEDURE DATE:  03/26/2018            INTERPRETATION:  CLINICAL INDICATION: Shortness of breath. Rales.    TECHNIQUE: Single AP view of the chest was obtained on 3/26/2018.    COMPARISON: 3/25/2018.    FINDINGS: Elevation of the right hemidiaphragm. Left lower lung linear   opacity compatible with atelectasis. The heart size is normal. The bones   are intact.  Left chest pacemaker. Status post TAVR.    IMPRESSION: Left lower lung subsegmental atelectasis.        BRADY BANERJEE M.D., RADIOLOGY RESIDENT  This document has been electronically signed.  FELIPE TEMPLETON M.D., ATTENDING RADIOLOGIST  This document has been electronically signed. Mar 26 2018  3:56PM                < end of copied text >

## 2018-03-28 LAB
-  AMPICILLIN/SULBACTAM: SIGNIFICANT CHANGE UP
-  CEFAZOLIN: SIGNIFICANT CHANGE UP
-  CIPROFLOXACIN: SIGNIFICANT CHANGE UP
-  CLINDAMYCIN: SIGNIFICANT CHANGE UP
-  DAPTOMYCIN: SIGNIFICANT CHANGE UP
-  ERYTHROMYCIN: SIGNIFICANT CHANGE UP
-  GENTAMICIN: SIGNIFICANT CHANGE UP
-  LEVOFLOXACIN: SIGNIFICANT CHANGE UP
-  LINEZOLID: SIGNIFICANT CHANGE UP
-  MOXIFLOXACIN(AEROBIC): SIGNIFICANT CHANGE UP
-  OXACILLIN: SIGNIFICANT CHANGE UP
-  PENICILLIN: SIGNIFICANT CHANGE UP
-  RIFAMPIN: SIGNIFICANT CHANGE UP
-  TETRACYCLINE: SIGNIFICANT CHANGE UP
-  TRIMETHOPRIM/SULFAMETHOXAZOLE: SIGNIFICANT CHANGE UP
-  VANCOMYCIN: SIGNIFICANT CHANGE UP
ANION GAP SERPL CALC-SCNC: 12 MMOL/L — SIGNIFICANT CHANGE UP (ref 5–17)
BLD GP AB SCN SERPL QL: POSITIVE — SIGNIFICANT CHANGE UP
BUN SERPL-MCNC: 14 MG/DL — SIGNIFICANT CHANGE UP (ref 7–23)
CALCIUM SERPL-MCNC: 8.6 MG/DL — SIGNIFICANT CHANGE UP (ref 8.4–10.5)
CHLORIDE SERPL-SCNC: 106 MMOL/L — SIGNIFICANT CHANGE UP (ref 96–108)
CO2 SERPL-SCNC: 21 MMOL/L — LOW (ref 22–31)
CREAT SERPL-MCNC: 0.78 MG/DL — SIGNIFICANT CHANGE UP (ref 0.5–1.3)
DAT POLY-SP REAG RBC QL: NEGATIVE — SIGNIFICANT CHANGE UP
GLUCOSE SERPL-MCNC: 99 MG/DL — SIGNIFICANT CHANGE UP (ref 70–99)
HCT VFR BLD CALC: 22.7 % — LOW (ref 34.5–45)
HGB BLD-MCNC: 7.4 G/DL — LOW (ref 11.5–15.5)
INR BLD: 1.26 RATIO — HIGH (ref 0.88–1.16)
MCHC RBC-ENTMCNC: 28.1 PG — SIGNIFICANT CHANGE UP (ref 27–34)
MCHC RBC-ENTMCNC: 32.6 GM/DL — SIGNIFICANT CHANGE UP (ref 32–36)
MCV RBC AUTO: 86.3 FL — SIGNIFICANT CHANGE UP (ref 80–100)
METHOD TYPE: SIGNIFICANT CHANGE UP
NRBC # BLD: 0 /100 WBCS — SIGNIFICANT CHANGE UP (ref 0–0)
OB PNL STL: NEGATIVE — SIGNIFICANT CHANGE UP
PLATELET # BLD AUTO: 153 K/UL — SIGNIFICANT CHANGE UP (ref 150–400)
POTASSIUM SERPL-MCNC: 4.3 MMOL/L — SIGNIFICANT CHANGE UP (ref 3.5–5.3)
POTASSIUM SERPL-SCNC: 4.3 MMOL/L — SIGNIFICANT CHANGE UP (ref 3.5–5.3)
PROTHROM AB SERPL-ACNC: 14.3 SEC — HIGH (ref 10–13.1)
RBC # BLD: 2.63 M/UL — LOW (ref 3.8–5.2)
RBC # FLD: 17.9 % — HIGH (ref 10.3–14.5)
RH IG SCN BLD-IMP: POSITIVE — SIGNIFICANT CHANGE UP
SODIUM SERPL-SCNC: 139 MMOL/L — SIGNIFICANT CHANGE UP (ref 135–145)
WBC # BLD: 6.08 K/UL — SIGNIFICANT CHANGE UP (ref 3.8–10.5)
WBC # FLD AUTO: 6.08 K/UL — SIGNIFICANT CHANGE UP (ref 3.8–10.5)

## 2018-03-28 PROCEDURE — 86077 PHYS BLOOD BANK SERV XMATCH: CPT

## 2018-03-28 PROCEDURE — 99232 SBSQ HOSP IP/OBS MODERATE 35: CPT

## 2018-03-28 RX ORDER — FUROSEMIDE 40 MG
20 TABLET ORAL ONCE
Qty: 0 | Refills: 0 | Status: COMPLETED | OUTPATIENT
Start: 2018-03-28 | End: 2018-03-28

## 2018-03-28 RX ORDER — WARFARIN SODIUM 2.5 MG/1
5 TABLET ORAL ONCE
Qty: 0 | Refills: 0 | Status: COMPLETED | OUTPATIENT
Start: 2018-03-28 | End: 2018-03-28

## 2018-03-28 RX ADMIN — Medication 10 MILLIGRAM(S): at 03:11

## 2018-03-28 RX ADMIN — BUDESONIDE AND FORMOTEROL FUMARATE DIHYDRATE 2 PUFF(S): 160; 4.5 AEROSOL RESPIRATORY (INHALATION) at 05:24

## 2018-03-28 RX ADMIN — Medication 1 TABLET(S): at 22:42

## 2018-03-28 RX ADMIN — BUDESONIDE AND FORMOTEROL FUMARATE DIHYDRATE 2 PUFF(S): 160; 4.5 AEROSOL RESPIRATORY (INHALATION) at 17:10

## 2018-03-28 RX ADMIN — OXYCODONE HYDROCHLORIDE 5 MILLIGRAM(S): 5 TABLET ORAL at 05:34

## 2018-03-28 RX ADMIN — Medication 1 MILLIGRAM(S): at 12:43

## 2018-03-28 RX ADMIN — SODIUM CHLORIDE 3 MILLILITER(S): 9 INJECTION INTRAMUSCULAR; INTRAVENOUS; SUBCUTANEOUS at 21:29

## 2018-03-28 RX ADMIN — SODIUM CHLORIDE 3 MILLILITER(S): 9 INJECTION INTRAMUSCULAR; INTRAVENOUS; SUBCUTANEOUS at 08:00

## 2018-03-28 RX ADMIN — OXYCODONE HYDROCHLORIDE 5 MILLIGRAM(S): 5 TABLET ORAL at 18:04

## 2018-03-28 RX ADMIN — DAPTOMYCIN 136 MILLIGRAM(S): 500 INJECTION, POWDER, LYOPHILIZED, FOR SOLUTION INTRAVENOUS at 13:55

## 2018-03-28 RX ADMIN — ENOXAPARIN SODIUM 120 MILLIGRAM(S): 100 INJECTION SUBCUTANEOUS at 05:24

## 2018-03-28 RX ADMIN — SODIUM CHLORIDE 3 MILLILITER(S): 9 INJECTION INTRAMUSCULAR; INTRAVENOUS; SUBCUTANEOUS at 13:50

## 2018-03-28 RX ADMIN — WARFARIN SODIUM 5 MILLIGRAM(S): 2.5 TABLET ORAL at 22:42

## 2018-03-28 RX ADMIN — Medication 975 MILLIGRAM(S): at 22:42

## 2018-03-28 RX ADMIN — OXYCODONE HYDROCHLORIDE 10 MILLIGRAM(S): 5 TABLET ORAL at 09:30

## 2018-03-28 RX ADMIN — SIMVASTATIN 20 MILLIGRAM(S): 20 TABLET, FILM COATED ORAL at 22:43

## 2018-03-28 RX ADMIN — CYCLOBENZAPRINE HYDROCHLORIDE 5 MILLIGRAM(S): 10 TABLET, FILM COATED ORAL at 17:10

## 2018-03-28 RX ADMIN — POLYETHYLENE GLYCOL 3350 17 GRAM(S): 17 POWDER, FOR SOLUTION ORAL at 12:45

## 2018-03-28 RX ADMIN — OXYCODONE HYDROCHLORIDE 5 MILLIGRAM(S): 5 TABLET ORAL at 17:04

## 2018-03-28 RX ADMIN — Medication 100 MILLIGRAM(S): at 05:24

## 2018-03-28 RX ADMIN — Medication 100 MILLIGRAM(S): at 13:56

## 2018-03-28 RX ADMIN — Medication 500 MILLIGRAM(S): at 05:24

## 2018-03-28 RX ADMIN — Medication 1 TABLET(S): at 05:24

## 2018-03-28 RX ADMIN — PANTOPRAZOLE SODIUM 40 MILLIGRAM(S): 20 TABLET, DELAYED RELEASE ORAL at 12:45

## 2018-03-28 RX ADMIN — ENOXAPARIN SODIUM 120 MILLIGRAM(S): 100 INJECTION SUBCUTANEOUS at 17:11

## 2018-03-28 RX ADMIN — Medication 325 MILLIGRAM(S): at 08:32

## 2018-03-28 RX ADMIN — Medication 1 TABLET(S): at 12:44

## 2018-03-28 RX ADMIN — Medication 20 MILLIGRAM(S): at 12:58

## 2018-03-28 RX ADMIN — OXYCODONE HYDROCHLORIDE 10 MILLIGRAM(S): 5 TABLET ORAL at 08:30

## 2018-03-28 RX ADMIN — Medication 500 MILLIGRAM(S): at 17:10

## 2018-03-28 RX ADMIN — Medication 325 MILLIGRAM(S): at 12:43

## 2018-03-28 RX ADMIN — Medication 975 MILLIGRAM(S): at 23:12

## 2018-03-28 RX ADMIN — Medication 100 MILLIGRAM(S): at 22:42

## 2018-03-28 RX ADMIN — Medication 1 TABLET(S): at 13:56

## 2018-03-28 RX ADMIN — Medication 325 MILLIGRAM(S): at 17:10

## 2018-03-28 RX ADMIN — CYCLOBENZAPRINE HYDROCHLORIDE 5 MILLIGRAM(S): 10 TABLET, FILM COATED ORAL at 05:24

## 2018-03-28 NOTE — PROGRESS NOTE ADULT - SUBJECTIVE AND OBJECTIVE BOX
Plastic Surgery Progress Note    S: Patient seen and examined.  Still has pain.     Vital Signs Last 24 Hrs  Vital Signs Last 24 Hrs  T(C): 37 (03-28-18 @ 05:20), Max: 37.2 (03-27-18 @ 21:42)  T(F): 98.6 (03-28-18 @ 05:20), Max: 98.9 (03-27-18 @ 21:42)  HR: 86 (03-28-18 @ 05:20) (78 - 95)  BP: 135/70 (03-28-18 @ 05:20) (121/74 - 135/70)  BP(mean): --  RR: 20 (03-28-18 @ 05:20) (16 - 20)  SpO2: 97% (03-28-18 @ 05:20) (93% - 97%)  I&O's Detail    27 Mar 2018 07:01  -  28 Mar 2018 07:00  --------------------------------------------------------  IN:    IV PiggyBack: 50 mL    Oral Fluid: 660 mL    sodium chloride 0.9%: 350 mL  Total IN: 1060 mL    OUT:    Drain: 70 mL    Indwelling Catheter - Urethral: 1300 mL  Total OUT: 1370 mL    Total NET: -310 mL      Physical Exam:  General: Awake and alert, NAD  R Leg: Prevena Plus incisional VAC removed. Medial aspect of incision with ecchymosis and epidermolysis. Dressed with xeroform, gauze, and ace. Drain serosanguinous, left in place.  Some erythema in upper inner medial thigh, possibly due to irritation from immobilizer or Ace. Placed ABD to protect skin  Immobilizer snug fit                          7.6    6.26  )-----------( 138      ( 27 Mar 2018 21:56 )             23.5     28 Mar 2018 05:10    139    |  106    |  14     ----------------------------<  99     4.3     |  21     |  0.78     Ca    8.6        28 Mar 2018 05:10        PT/INR - ( 27 Mar 2018 05:31 )   PT: 14.3 sec;   INR: 1.30 ratio         PTT - ( 27 Mar 2018 05:31 )  PTT:41.9 sec  CAPILLARY BLOOD GLUCOSE

## 2018-03-28 NOTE — PROGRESS NOTE ADULT - SUBJECTIVE AND OBJECTIVE BOX
NO acute changes in her symptomatology  Was a bit sleepy from the pain medication but arousable    Vital Signs Last 24 Hrs  T(C): 36.8 (28 Mar 2018 12:50), Max: 37.2 (27 Mar 2018 21:42)  T(F): 98.2 (28 Mar 2018 12:50), Max: 98.9 (27 Mar 2018 21:42)  HR: 82 (28 Mar 2018 12:50) (78 - 95)  BP: 135/74 (28 Mar 2018 12:50) (121/74 - 135/74)  BP(mean): --  RR: 18 (28 Mar 2018 12:50) (16 - 20)  SpO2: 97% (28 Mar 2018 12:50) (93% - 97%)    GENERAL: NAD, AAOx3, obese  HEAD:  Atraumatic, Normocephalic  EYES: EOMI  NECK: Supple, No JVD  CHEST/LUNG: CTABL, No wheeze  HEART: Regular rate and rhythm; no murmur  ABDOMEN: Soft, Nontender, Nondistended; Bowel sounds present  EXTREMITIES:  2+ Peripheral Pulses, right leg in boot, wound vac, dressed, right knee PASCUAL with bloody drainage  SKIN: No rashes or lesions  NEURO: No focal deficits    LABS:                        7.4    6.08  )-----------( 153      ( 28 Mar 2018 07:31 )             22.7     03-28    139  |  106  |  14  ----------------------------<  99  4.3   |  21<L>  |  0.78    Ca    8.6      28 Mar 2018 05:10      PT/INR - ( 28 Mar 2018 07:32 )   PT: 14.3 sec;   INR: 1.26 ratio         PTT - ( 27 Mar 2018 05:31 )  PTT:41.9 sec  CAPILLARY BLOOD GLUCOSE

## 2018-03-28 NOTE — PROGRESS NOTE ADULT - SUBJECTIVE AND OBJECTIVE BOX
CC: f/u for septic tkr    Patient reports knee is better, vac is out    REVIEW OF SYSTEMS:  All other review of systems negative (Comprehensive ROS)    Antimicrobials Day #  :4/42  DAPTOmycin IVPB 900 milliGRAM(s) IV Intermittent every 24 hours    Other Medications Reviewed    T(F): 98.6 (03-28-18 @ 17:15), Max: 98.9 (03-27-18 @ 21:42)  HR: 84 (03-28-18 @ 17:15)  BP: 138/80 (03-28-18 @ 17:15)  RR: 18 (03-28-18 @ 17:15)  SpO2: 98% (03-28-18 @ 17:15)  Wt(kg): --    PHYSICAL EXAM:  General: alert, no acute distress  Eyes:  anicteric, no conjunctival injection, no discharge  Oropharynx: no lesions or injection 	  Neck: supple, without adenopathy  Lungs: decreased at bases to auscultation  Heart: s1s2 no murmur, rubs or gallops  Abdomen: soft, nondistended, nontender, without mass or organomegaly  Skin: no lesions  Extremities:right knee wound clean, leg with edema  Neurologic: alert, oriented, moves all extremities    LAB RESULTS:                        7.4    6.08  )-----------( 153      ( 28 Mar 2018 07:31 )             22.7     03-28    139  |  106  |  14  ----------------------------<  99  4.3   |  21<L>  |  0.78    Ca    8.6      28 Mar 2018 05:10          MICROBIOLOGY:  RECENT CULTURES:  03-27 @ 02:11 .Blood Blood-Peripheral     No growth to date.      03-25 @ 11:58 .Urine Catheterized     No growth      03-24 @ 05:48 .Blood Blood     No growth to date.      03-24 @ 03:55 .Body Fluid Synovial Fluid-- rt knee Methicillin resistant Staphylococcus aureus    Rare Methicillin resistant Staphylococcus aureus    Few polymorphonuclear leukocytes seen per low power field  No organisms seen    03-24 @ 03:53 .Tissue Other, synovium #1 Methicillin resistant Staphylococcus aureus    Rare Methicillin resistant Staphylococcus aureus    Few polymorphonuclear leukocytes seen per low power field  No organisms seen    03-24 @ 03:51 .Tissue Other, femoral canal Methicillin resistant Staphylococcus aureus    Rare Methicillin resistant Staphylococcus aureus    Numerous polymorphonuclear leukocytes seen per low power field  No organisms seen    03-24 @ 03:41 .Tissue Other, synovium #2 Methicillin resistant Staphylococcus aureus    Growth in fluid media only Staphylococcus aureus    Few polymorphonuclear leukocytes seen per low power field  No organisms seen    03-24 @ 03:40 .Tissue Other, tibial canal     No growth    No polymorphonuclear cells seen per low power field  No organisms seen per oil power field            Assessment:  Patient had right tkr septic arthritis with strept species and had rudy, spacer , abx but wound failed to heal and now found to have mrsa septic arthritis so s/p spacer exchange, fusion ruthann and wound revision.   Plan: continue daptomycin for 6 weeks and then suppressive minocin  local wound care per plastic  check cpk

## 2018-03-28 NOTE — PROGRESS NOTE ADULT - SUBJECTIVE AND OBJECTIVE BOX
Patient  seen and examined  no complaints    amoxicillin (Other)  IV Contrast (Flushing (Skin); Nausea)    Hospital Medications:   MEDICATIONS  (STANDING):  acetaminophen   Tablet. 975 milliGRAM(s) Oral every 8 hours  ascorbic acid 500 milliGRAM(s) Oral two times a day  buDESOnide 160 MICROgram(s)/formoterol 4.5 MICROgram(s) Inhaler 2 Puff(s) Inhalation two times a day  calcium carbonate 1250 mG + Vitamin D (OsCal 500 + D) 1 Tablet(s) Oral three times a day  cyclobenzaprine 5 milliGRAM(s) Oral two times a day  DAPTOmycin IVPB 900 milliGRAM(s) IV Intermittent every 24 hours  docusate sodium 100 milliGRAM(s) Oral three times a day  enoxaparin Injectable 120 milliGRAM(s) SubCutaneous every 12 hours  ferrous    sulfate 325 milliGRAM(s) Oral three times a day with meals  folic acid 1 milliGRAM(s) Oral daily  furosemide   Injectable 20 milliGRAM(s) IV Push once  multivitamin 1 Tablet(s) Oral daily  pantoprazole    Tablet 40 milliGRAM(s) Oral daily  polyethylene glycol 3350 17 Gram(s) Oral daily  simvastatin 20 milliGRAM(s) Oral at bedtime  sodium chloride 0.9% lock flush 3 milliLiter(s) IV Push every 8 hours  warfarin 5 milliGRAM(s) Oral once      VITALS:  T(F): 98.6 (03-28-18 @ 05:20), Max: 98.9 (03-27-18 @ 21:42)  HR: 86 (03-28-18 @ 05:20)  BP: 135/70 (03-28-18 @ 05:20)  RR: 20 (03-28-18 @ 05:20)  SpO2: 97% (03-28-18 @ 05:20)  Wt(kg): --    03-27 @ 07:01  -  03-28 @ 07:00  --------------------------------------------------------  IN: 1060 mL / OUT: 1370 mL / NET: -310 mL    03-28 @ 07:01  -  03-28 @ 12:31  --------------------------------------------------------  IN: 360 mL / OUT: 0 mL / NET: 360 mL        Weight (kg): 120 (03-27 @ 16:50)  PHYSICAL EXAM:  Constitutional: NAD; pale  HEENT: anicteric sclera, oropharynx clear, MMM  Neck: No JVD  Respiratory:b/l  rhonchi  Cardiovascular: S1, S2, RRR  Gastrointestinal: BS+, soft, NT/ND  Extremities: 4+edema; right knee immobilizer  Neurological: A/O x 3, no focal deficits  Psychiatric: Normal mood, normal affect  : +indwelling lund.   Skin: No rashes  LABS:  03-28    139  |  106  |  14  ----------------------------<  99  4.3   |  21<L>  |  0.78    Ca    8.6      28 Mar 2018 05:10      Creatinine Trend: 0.78 <--, 0.94 <--, 1.21 <--, 1.91 <--, 1.32 <--, 0.84 <--                        7.4    6.08  )-----------( 153      ( 28 Mar 2018 07:31 )             22.7     Urine Studies:    Creatinine, Random Urine: 198 mg/dL (03-25 @ 10:17)  Osmolality, Random Urine: 355 mos/kg (03-25 @ 10:17)  Chloride, Random Urine: <35 mmol/L (03-25 @ 10:17)  Potassium, Random Urine: 55 mmol/L (03-25 @ 10:17)  Sodium, Random Urine: 28 mmol/L (03-25 @ 10:17)    RADIOLOGY & ADDITIONAL STUDIES:

## 2018-03-28 NOTE — PROGRESS NOTE ADULT - ATTENDING COMMENTS
I agree with the above note and have personally seen and examined this patient. All pertinent films have been reviewed. Please refer to clinical documentation of the history, physical examinations, data summary, and both assessment and plan as documented above and with which I agree.    patient is s/p R knee spacer exchange and I&D and complex wound closure. she has kasia postop as well as hypotension and supratherapeutic inr. wound cultures positive in knee for MRSA    plan  1. hospitalist- appreciate their primary management on this medically complex patient.   2. INR goal 2-3, lovenox bridge to coumadin  3. has acute blood loss anemia secondary to surgery as well as dilution response from IVF, now resolved and hct stable, trend cbc  4. cards - Dr. Roblero following, he spoke with EP who stated that abnormal tele results may be normal 2/2 ventricular paced ppm  5. renal - patient has kasia on ckd in setting of high dose antibiotic spacer, iv vanc, toradol, and lovenox. now resolved, lund still in place, consider dc lund when able  6. id- on dapto for MRSA, PICC placed  7. pain control with po meds, holding nsaids, cont tylenol and tramadol/oxycodone prn, avoid nsaids in setting of kasia, pain currently well controlled  8. pt/ot - if stable okay for oob to beside chair, no knee ROM, PWB RLE  9. wound care, drain care per plastics team  10. dispo planning to snf when bed available    Nate Bass MD  Attending Orthopedic Surgeon . I agree with the above note and have personally seen and examined this patient. All pertinent films have been reviewed. Please refer to clinical documentation of the history, physical examinations, data summary, and both assessment and plan as documented above and with which I agree.    patient is s/p R knee spacer exchange and I&D and complex wound closure. she has kasia postop as well as hypotension and supratherapeutic inr. wound cultures positive in knee for MRSA    plan  1. hospitalist- appreciate their primary management on this medically complex patient.   2. INR goal 2-3, lovenox bridge to coumadin  3. h/h now stable  4. cards - Dr. Roblero following, he spoke with EP who stated that abnormal tele results may be normal 2/2 ventricular paced ppm  5. renal - resolved kasia on ckd  6. please consider dc ashely  7. id- on dapto for MRSA, has PICC  8. pain control with po meds   9. pt/ot - no knee ROM, PWB RLE  10. wound care, drain care per plastics team  11. dispo planning to snf when bed available    Nate Bass MD  Attending Orthopedic Surgeon .

## 2018-03-28 NOTE — PROGRESS NOTE ADULT - ASSESSMENT
A/P: 77F s/p R total knee insertion of spacer, irrigation and debridement, complex wound closure (POD 5)  - Pain control  - Continue PASCUAL care  - Care per primary team  - xeroform to epidermolysis, to be changed daily  - ortho to evaluate fit of immobilizer  -d/w ortho team    [id number] A/P: 77F s/p R total knee insertion of spacer, irrigation and debridement, complex wound closure (POD 5)  - Pain control  - Continue  care  - Care per primary team  - xeroform to epidermolysis, to be changed daily--will switch to medihoney tomorrow	  - ortho to evaluate fit of immobilizer  -d/w ortho team    [id number]

## 2018-03-28 NOTE — CHART NOTE - NSCHARTNOTEFT_GEN_A_CORE
Fit and apply Oliver OR Merit hinged post op knee orthosis licked in extension. Written instruction and contact information left bedside. To notify office with any issues questions or concerns.  Bernardo DALY  [facility]  [phone]

## 2018-03-28 NOTE — PROGRESS NOTE ADULT - SUBJECTIVE AND OBJECTIVE BOX
Patient seen and examined. No acute events overnight, patient complains of L knee pain.  Pain is controlled. No nausea, vomiting, chest pain, shortness of breath, lightheadedness, or dizziness.     Gen: NAD  RLE  Dressing clean, dry, intact in BJKI, PASCUAL in place, WV in place  EHL/FHL/TA/GS intact  SILT DP/SP/Stone/Sa  WWP distally             Vital Signs Last 24 Hrs  T(C): 37 (28 Mar 2018 05:20), Max: 37.2 (27 Mar 2018 21:42)  T(F): 98.6 (28 Mar 2018 05:20), Max: 98.9 (27 Mar 2018 21:42)  HR: 86 (28 Mar 2018 05:20) (78 - 95)  BP: 135/70 (28 Mar 2018 05:20) (121/74 - 135/70)  RR: 20 (28 Mar 2018 05:20) (16 - 20)  SpO2: 97% (28 Mar 2018 05:20) (93% - 97%)    76 yo F s/p Removal of articulating abx cement spacer, Rev to Non articulating Spacer POD 5    Analgesia  FU ORCx, growing staph aureus  FU BCx (Rec)   FU ID regarding PICC this am vs waiting until repeat BCx negative  IV Dapto per ID, PICC'd 3/27  Avoid nephrotoxic meds  Wound Management and PASCUAL/WV management per plastics  FU Med Re restarting DVT ppx, patient has IVCF  Incentive spirometry  50% PWB In KI at all times, no knee ROM allowed   PT/OT/OOB  Will discuss with Dr. Bass

## 2018-03-28 NOTE — PROGRESS NOTE ADULT - ASSESSMENT
77 yr female PMH HTN HLD CAD s/p PCI Severe AV s/p TAVR SSS s/p PPM Polymyalgia Anxiety GERD chronic back pain  s/p Right Total Knee spacer exchange with YONATAN now resolving

## 2018-03-28 NOTE — PROGRESS NOTE ADULT - ASSESSMENT
77 yr female PMH HTN HLD CAD s/p PCI Severe AV s/p TAVR SSS s/p PPM Polymyalgia Anxiety GERD chronic back pain presents R knee spacer exchange and I&D and complex wound closure, post op complicated YONATAN, hypotension and supratherapeutic INC and acute blood loss anemia. wound cultures positive in knee for MRSA.    Infected Knee - appreciate plastics/ortho, appreciate ID recs, cont dapto for 6-8 wks then minocycline, count wound care, s/p PICC 3/27    Wide complex tachycardia - stable, appreciate cardio recs    YONATAN - resolved with IVF; d/kimber lund order; appreciate renal    acute blood loss anemia - likely post op blood loss, sp 3 units prbc total, CBC daily, maintain hgb >7.5; 2 half-units of pRBC for 3/28/18    HTN HLD CAD s/p TAVR SSS s/p ppm - c/w current meds    DVT on coumadin - lovenox/coumadin s/p PICC    Polymyalgia - steroids pain control PT as tolerated     Asthma - respiratory status stable duonebs prn     DVT PPX    plan of care dw patient and NP and aid at bedside

## 2018-03-29 DIAGNOSIS — M00.061 STAPHYLOCOCCAL ARTHRITIS, RIGHT KNEE: ICD-10-CM

## 2018-03-29 DIAGNOSIS — I25.10 ATHEROSCLEROTIC HEART DISEASE OF NATIVE CORONARY ARTERY WITHOUT ANGINA PECTORIS: ICD-10-CM

## 2018-03-29 LAB
ANION GAP SERPL CALC-SCNC: 10 MMOL/L — SIGNIFICANT CHANGE UP (ref 5–17)
BUN SERPL-MCNC: 12 MG/DL — SIGNIFICANT CHANGE UP (ref 7–23)
CALCIUM SERPL-MCNC: 8.7 MG/DL — SIGNIFICANT CHANGE UP (ref 8.4–10.5)
CHLORIDE SERPL-SCNC: 105 MMOL/L — SIGNIFICANT CHANGE UP (ref 96–108)
CK SERPL-CCNC: 20 U/L — LOW (ref 25–170)
CO2 SERPL-SCNC: 24 MMOL/L — SIGNIFICANT CHANGE UP (ref 22–31)
CREAT SERPL-MCNC: 0.8 MG/DL — SIGNIFICANT CHANGE UP (ref 0.5–1.3)
CULTURE RESULTS: SIGNIFICANT CHANGE UP
GLUCOSE SERPL-MCNC: 98 MG/DL — SIGNIFICANT CHANGE UP (ref 70–99)
HCT VFR BLD CALC: 25.4 % — LOW (ref 34.5–45)
HGB BLD-MCNC: 8.1 G/DL — LOW (ref 11.5–15.5)
INR BLD: 1.78 RATIO — HIGH (ref 0.88–1.16)
MCHC RBC-ENTMCNC: 28.7 PG — SIGNIFICANT CHANGE UP (ref 27–34)
MCHC RBC-ENTMCNC: 31.9 GM/DL — LOW (ref 32–36)
MCV RBC AUTO: 90.1 FL — SIGNIFICANT CHANGE UP (ref 80–100)
NRBC # BLD: 0 /100 WBCS — SIGNIFICANT CHANGE UP (ref 0–0)
ORGANISM # SPEC MICROSCOPIC CNT: SIGNIFICANT CHANGE UP
PLATELET # BLD AUTO: 160 K/UL — SIGNIFICANT CHANGE UP (ref 150–400)
POTASSIUM SERPL-MCNC: 4 MMOL/L — SIGNIFICANT CHANGE UP (ref 3.5–5.3)
POTASSIUM SERPL-SCNC: 4 MMOL/L — SIGNIFICANT CHANGE UP (ref 3.5–5.3)
PROTHROM AB SERPL-ACNC: 20.3 SEC — HIGH (ref 10–13.1)
RBC # BLD: 2.82 M/UL — LOW (ref 3.8–5.2)
RBC # FLD: 17 % — HIGH (ref 10.3–14.5)
SODIUM SERPL-SCNC: 139 MMOL/L — SIGNIFICANT CHANGE UP (ref 135–145)
SPECIMEN SOURCE: SIGNIFICANT CHANGE UP
WBC # BLD: 4.54 K/UL — SIGNIFICANT CHANGE UP (ref 3.8–10.5)
WBC # FLD AUTO: 4.54 K/UL — SIGNIFICANT CHANGE UP (ref 3.8–10.5)

## 2018-03-29 RX ORDER — METOPROLOL TARTRATE 50 MG
12.5 TABLET ORAL
Qty: 0 | Refills: 0 | Status: DISCONTINUED | OUTPATIENT
Start: 2018-03-29 | End: 2018-04-04

## 2018-03-29 RX ADMIN — Medication 1 TABLET(S): at 11:33

## 2018-03-29 RX ADMIN — OXYCODONE HYDROCHLORIDE 5 MILLIGRAM(S): 5 TABLET ORAL at 08:12

## 2018-03-29 RX ADMIN — OXYCODONE HYDROCHLORIDE 5 MILLIGRAM(S): 5 TABLET ORAL at 12:30

## 2018-03-29 RX ADMIN — BUDESONIDE AND FORMOTEROL FUMARATE DIHYDRATE 2 PUFF(S): 160; 4.5 AEROSOL RESPIRATORY (INHALATION) at 17:27

## 2018-03-29 RX ADMIN — Medication 325 MILLIGRAM(S): at 08:01

## 2018-03-29 RX ADMIN — Medication 1 TABLET(S): at 17:27

## 2018-03-29 RX ADMIN — CYCLOBENZAPRINE HYDROCHLORIDE 5 MILLIGRAM(S): 10 TABLET, FILM COATED ORAL at 17:27

## 2018-03-29 RX ADMIN — POLYETHYLENE GLYCOL 3350 17 GRAM(S): 17 POWDER, FOR SOLUTION ORAL at 11:33

## 2018-03-29 RX ADMIN — ENOXAPARIN SODIUM 120 MILLIGRAM(S): 100 INJECTION SUBCUTANEOUS at 17:28

## 2018-03-29 RX ADMIN — DAPTOMYCIN 136 MILLIGRAM(S): 500 INJECTION, POWDER, LYOPHILIZED, FOR SOLUTION INTRAVENOUS at 13:20

## 2018-03-29 RX ADMIN — Medication 12.5 MILLIGRAM(S): at 18:08

## 2018-03-29 RX ADMIN — Medication 100 MILLIGRAM(S): at 17:27

## 2018-03-29 RX ADMIN — OXYCODONE HYDROCHLORIDE 5 MILLIGRAM(S): 5 TABLET ORAL at 07:12

## 2018-03-29 RX ADMIN — SIMVASTATIN 20 MILLIGRAM(S): 20 TABLET, FILM COATED ORAL at 21:42

## 2018-03-29 RX ADMIN — OXYCODONE HYDROCHLORIDE 5 MILLIGRAM(S): 5 TABLET ORAL at 11:30

## 2018-03-29 RX ADMIN — OXYCODONE HYDROCHLORIDE 5 MILLIGRAM(S): 5 TABLET ORAL at 21:48

## 2018-03-29 RX ADMIN — Medication 325 MILLIGRAM(S): at 11:34

## 2018-03-29 RX ADMIN — Medication 325 MILLIGRAM(S): at 17:36

## 2018-03-29 RX ADMIN — Medication 1 TABLET(S): at 04:57

## 2018-03-29 RX ADMIN — Medication 500 MILLIGRAM(S): at 04:57

## 2018-03-29 RX ADMIN — OXYCODONE HYDROCHLORIDE 5 MILLIGRAM(S): 5 TABLET ORAL at 22:20

## 2018-03-29 RX ADMIN — PANTOPRAZOLE SODIUM 40 MILLIGRAM(S): 20 TABLET, DELAYED RELEASE ORAL at 11:34

## 2018-03-29 RX ADMIN — Medication 100 MILLIGRAM(S): at 04:57

## 2018-03-29 RX ADMIN — Medication 975 MILLIGRAM(S): at 05:33

## 2018-03-29 RX ADMIN — SODIUM CHLORIDE 3 MILLILITER(S): 9 INJECTION INTRAMUSCULAR; INTRAVENOUS; SUBCUTANEOUS at 21:26

## 2018-03-29 RX ADMIN — CYCLOBENZAPRINE HYDROCHLORIDE 5 MILLIGRAM(S): 10 TABLET, FILM COATED ORAL at 04:57

## 2018-03-29 RX ADMIN — SODIUM CHLORIDE 3 MILLILITER(S): 9 INJECTION INTRAMUSCULAR; INTRAVENOUS; SUBCUTANEOUS at 14:00

## 2018-03-29 RX ADMIN — ENOXAPARIN SODIUM 120 MILLIGRAM(S): 100 INJECTION SUBCUTANEOUS at 04:57

## 2018-03-29 RX ADMIN — Medication 1 TABLET(S): at 21:42

## 2018-03-29 RX ADMIN — Medication 975 MILLIGRAM(S): at 05:03

## 2018-03-29 RX ADMIN — Medication 500 MILLIGRAM(S): at 17:27

## 2018-03-29 RX ADMIN — Medication 100 MILLIGRAM(S): at 21:41

## 2018-03-29 RX ADMIN — Medication 1 MILLIGRAM(S): at 11:34

## 2018-03-29 RX ADMIN — BUDESONIDE AND FORMOTEROL FUMARATE DIHYDRATE 2 PUFF(S): 160; 4.5 AEROSOL RESPIRATORY (INHALATION) at 04:59

## 2018-03-29 RX ADMIN — SODIUM CHLORIDE 3 MILLILITER(S): 9 INJECTION INTRAMUSCULAR; INTRAVENOUS; SUBCUTANEOUS at 05:03

## 2018-03-29 NOTE — PROGRESS NOTE ADULT - ASSESSMENT
A/P: 77F s/p R total knee insertion of spacer, irrigation and debridement, complex wound closure (POD 6)  - Pain control  - Continue  care  - Care per primary team  - medihony to epidermolysis, to be changed daily, xeroform over incision      [id number]

## 2018-03-29 NOTE — PROVIDER CONTACT NOTE (CRITICAL VALUE NOTIFICATION) - ASSESSMENT
Pt. asymptomatic, vitals stable
VSS.
Patient has no s/s of bleeding
Patient has no s/s of bleeding and only has complaints of pain
Patient has pain in right knee
Patient has pain in the right leg, see vitals flow sheet for vital signs
Pt afebrile and VSS. Pt remains safe with no s/s of pain or discomfort.

## 2018-03-29 NOTE — PROVIDER CONTACT NOTE (CRITICAL VALUE NOTIFICATION) - BACKGROUND
Pt admitted with right knee infection. S/P Right knee I & D
Pt admitted with right knee infection. S/P right knee I & D
Pt admitted with right knee infection. S/P right knee I & D.
Dx: Dyspnea
Patient had I & D on right knee with ruthann placement
Patient had an I & D of right knee
Patient has an admitting diagnosis of I & D with ruthann placement
Patient has an admitting diagnosis of infected right knee, I & D with ruthann placement
Pt. came in with infection of knee joint.

## 2018-03-29 NOTE — PROGRESS NOTE ADULT - SUBJECTIVE AND OBJECTIVE BOX
Patient seen and examined.   No acute events overnight.   Pain well controlled.    T(C): 36.5 (03-29-18 @ 13:22), Max: 37.4 (03-28-18 @ 21:38)  HR: 77 (03-29-18 @ 13:22) (69 - 84)  BP: 147/55 (03-29-18 @ 13:22) (116/68 - 147/55)  RR: 16 (03-29-18 @ 13:22) (16 - 18)  SpO2: 99% (03-29-18 @ 13:22) (93% - 99%)  Wt(kg): --                          8.1    4.54  )-----------( 160      ( 29 Mar 2018 07:43 )             25.4     03-29    139  |  105  |  12  ----------------------------<  98  4.0   |  24  |  0.80    Ca    8.7      29 Mar 2018 05:20      PT/INR - ( 29 Mar 2018 07:43 )   PT: 20.3 sec;   INR: 1.78 ratio      Gen: NAD  RLE  Dressing clean, dry, intact in BJKI, PASCUAL in place  EHL/FHL/TA/GS intact  SILT DP/SP/Stone/Sa  WWP distally               76 yo F s/p Removal of articulating abx cement spacer, Rev to Non articulating Spacer POD 6    Analgesia  FU ORCx, growing staph aureus  FU BCx  FU ID recs  IV Dapto per ID, PICC'd 3/27  Avoid nephrotoxic meds  Wound Management per PRS  FU PASCUAL removal  DVT PPX per med  Incentive spirometry  50% PWB In KI at all times, no knee ROM allowed   PT/OT/OOB  d/w Dr. Bass

## 2018-03-29 NOTE — PROVIDER CONTACT NOTE (CRITICAL VALUE NOTIFICATION) - RECOMMENDATIONS
Monitor patient, Pt. on antibiotics
Contact isolation maintained for MRSA. Pt receiving daptomycin Q 24 hrs. Continue monitoring.
Continue to monitor
Continue to monitor
Give PRBC
Repeat CBC

## 2018-03-29 NOTE — PROGRESS NOTE ADULT - SUBJECTIVE AND OBJECTIVE BOX
CC: f/u for mrsa right tkr infection    Patient reports it hurts to move the legs    REVIEW OF SYSTEMS:  All other review of systems negative (Comprehensive ROS)    Antimicrobials Day #  :5/42  DAPTOmycin IVPB 900 milliGRAM(s) IV Intermittent every 24 hours    Other Medications Reviewed    T(F): 97.7 (03-29-18 @ 13:22), Max: 99.3 (03-28-18 @ 21:38)  HR: 77 (03-29-18 @ 13:22)  BP: 147/55 (03-29-18 @ 13:22)  RR: 16 (03-29-18 @ 13:22)  SpO2: 99% (03-29-18 @ 13:22)  Wt(kg): --    PHYSICAL EXAM:  General: alert, no acute distress  Eyes:  anicteric, no conjunctival injection, no discharge  Oropharynx: no lesions or injection 	  Neck: supple, without adenopathy  Lungs: clear to auscultation  Heart: regular rate and rhythm; no murmur, rubs or gallops  Abdomen: soft, nondistended, nontender, without mass or organomegaly, obese  Skin: no lesions  Extremities: no clubbing, cyanosis,. legs with edema  Neurologic: alert, oriented, moves all extremities    LAB RESULTS:                        8.1    4.54  )-----------( 160      ( 29 Mar 2018 07:43 )             25.4     03-29    139  |  105  |  12  ----------------------------<  98  4.0   |  24  |  0.80    Ca    8.7      29 Mar 2018 05:20          MICROBIOLOGY:  RECENT CULTURES:  03-27 @ 02:11 .Blood Blood-Peripheral     No growth to date.      03-25 @ 11:58 .Urine Catheterized     No growth  Culture - Body Fluid with Gram Stain (03.24.18 @ 03:55)    -  Trimethoprim/Sulfamethoxazole: S <=0.5/9.5    -  Vancomycin: S 2    Gram Stain:   Few polymorphonuclear leukocytes seen per low power field  No organisms seen    -  Cefazolin: R >16    -  Ampicillin/Sulbactam: R 16/8    -  Ciprofloxacin: R >2    -  Clindamycin: R >4    -  Daptomycin: S 1    -  Erythromycin: R >4    -  Gentamicin: S <=1    -  Levofloxacin: R >4    -  Linezolid: S 2    -  Moxifloxacin(Aerobic): R >4    -  Oxacillin: R >2    -  Penicillin: R >8    -  RIF- Rifampin: S <=1    -  Tetra/Doxy: S 2    Specimen Source: .Body Fluid Synovial Fluid-- rt knee    Culture Results:   Rare Methicillin resistant Staphylococcus aureus    Organism Identification: Methicillin resistant Staphylococcus aureus    Organism: Methicillin resistant Staphylococcus aureus    Method Type: EDWARD            RADIOLOGY REVIEWED:    < from: Xray Chest 1 View- PORTABLE-Urgent (03.27.18 @ 15:52) >  IMPRESSION: Right PICC tip in SVC. No pneumothorax    < end of copied text >            Assessment/Plan  Patient with shannon, had recent  right tkr septic arthritis and bacteremia with strept sanguis so had rudy, spacer , prolonged iv then suppressive oral antibiotics but returns with poorly healed wound so had exchange of spacer and fusion ruthann and wound revision with growth of mrsa from wound this time. she will need 6 weeks of iv dapto then extended po minocin and keflex  so for now continue daptomycin as outlined and then po minocycline and keflex extended tx to suppress mrsa and strept sanguis

## 2018-03-29 NOTE — PROGRESS NOTE ADULT - ASSESSMENT
77 yr female PMH HTN HLD CAD s/p PCI Severe AV s/p TAVR SSS s/p PPM Polymyalgia Anxiety GERD chronic back pain presents R knee spacer exchange and I&D and complex wound closure, post op complicated YONATAN, hypotension and supratherapeutic INC and acute blood loss anemia. wound cultures positive in knee for MRSA.

## 2018-03-29 NOTE — PROGRESS NOTE ADULT - SUBJECTIVE AND OBJECTIVE BOX
Plastic Surgery Progress Note    S: Patient seen and examined.  still with pain. got 2 x 1/2 units PRBC yesterday    Vital Signs Last 24 Hrs  T(C): 36.9 (29 Mar 2018 04:45), Max: 37.4 (28 Mar 2018 21:38)  T(F): 98.4 (29 Mar 2018 04:45), Max: 99.3 (28 Mar 2018 21:38)  HR: 69 (29 Mar 2018 04:45) (69 - 84)  BP: 134/80 (29 Mar 2018 04:45) (116/68 - 146/77)  BP(mean): --  RR: 16 (29 Mar 2018 04:45) (16 - 18)  SpO2: 98% (29 Mar 2018 04:45) (93% - 98%)  I&O's Detail    28 Mar 2018 07:01  -  29 Mar 2018 07:00  --------------------------------------------------------  IN:    Oral Fluid: 1060 mL    Packed Red Blood Cells: 250 mL    Solution: 50 mL  Total IN: 1360 mL    OUT:    Drain: 10 mL    Indwelling Catheter - Urethral: 1150 mL    VAC (Vacuum Assisted Closure) System: 6 mL  Total OUT: 1166 mL    Total NET: 194 mL      29 Mar 2018 07:01  -  29 Mar 2018 08:55  --------------------------------------------------------  IN:  Total IN: 0 mL    OUT:    VAC (Vacuum Assisted Closure) System: 12 mL  Total OUT: 12 mL    Total NET: -12 mL          Physical Exam:  General: Awake and alert, NAD  R leg: Medial aspect of incision with ecchymosis and epidermolysis - stable. epidermolysis dressed with medihoney.  incision Dressed with xeroform.  Abd placed over all and wrapped. Drain serosanguinous, left in place.

## 2018-03-29 NOTE — PROGRESS NOTE ADULT - SUBJECTIVE AND OBJECTIVE BOX
Pain at same location, she usually falls asleep after getting the pain medication    Vital Signs Last 24 Hrs  T(C): 36.9 (29 Mar 2018 04:45), Max: 37.4 (28 Mar 2018 21:38)  T(F): 98.4 (29 Mar 2018 04:45), Max: 99.3 (28 Mar 2018 21:38)  HR: 69 (29 Mar 2018 04:45) (69 - 84)  BP: 134/80 (29 Mar 2018 04:45) (116/68 - 146/77)  BP(mean): --  RR: 16 (29 Mar 2018 04:45) (16 - 18)  SpO2: 98% (29 Mar 2018 04:45) (93% - 98%)    GENERAL: NAD, AAOx3, obese  HEAD:  Atraumatic, Normocephalic  EYES: EOMI  NECK: Supple, No JVD  CHEST/LUNG: CTABL, No wheeze  HEART: Regular rate and rhythm; no murmur  ABDOMEN: Soft, NTND, NABS  EXTREMITIES:   right leg in boot, wound vac, dressed, right knee PASCUAL with bloody drainage  SKIN: No rashes or lesions  NEURO: No focal deficits    LABS:                        8.1    4.54  )-----------( 160      ( 29 Mar 2018 07:43 )             25.4     03-29    139  |  105  |  12  ----------------------------<  98  4.0   |  24  |  0.80    Ca    8.7      29 Mar 2018 05:20      PT/INR - ( 29 Mar 2018 07:43 )   PT: 20.3 sec;   INR: 1.78 ratio           CAPILLARY BLOOD GLUCOSE        CARDIAC MARKERS ( 29 Mar 2018 05:20 )  x     / x     / 20 U/L / x     / x

## 2018-03-30 LAB
ANION GAP SERPL CALC-SCNC: 12 MMOL/L — SIGNIFICANT CHANGE UP (ref 5–17)
BUN SERPL-MCNC: 10 MG/DL — SIGNIFICANT CHANGE UP (ref 7–23)
CALCIUM SERPL-MCNC: 8.8 MG/DL — SIGNIFICANT CHANGE UP (ref 8.4–10.5)
CHLORIDE SERPL-SCNC: 104 MMOL/L — SIGNIFICANT CHANGE UP (ref 96–108)
CO2 SERPL-SCNC: 23 MMOL/L — SIGNIFICANT CHANGE UP (ref 22–31)
CREAT SERPL-MCNC: 0.71 MG/DL — SIGNIFICANT CHANGE UP (ref 0.5–1.3)
GLUCOSE SERPL-MCNC: 96 MG/DL — SIGNIFICANT CHANGE UP (ref 70–99)
HCT VFR BLD CALC: 26.1 % — LOW (ref 34.5–45)
HGB BLD-MCNC: 8.8 G/DL — LOW (ref 11.5–15.5)
INR BLD: 3.15 RATIO — HIGH (ref 0.88–1.16)
MCHC RBC-ENTMCNC: 30 PG — SIGNIFICANT CHANGE UP (ref 27–34)
MCHC RBC-ENTMCNC: 33.9 GM/DL — SIGNIFICANT CHANGE UP (ref 32–36)
MCV RBC AUTO: 88.6 FL — SIGNIFICANT CHANGE UP (ref 80–100)
PLATELET # BLD AUTO: 209 K/UL — SIGNIFICANT CHANGE UP (ref 150–400)
POTASSIUM SERPL-MCNC: 3.8 MMOL/L — SIGNIFICANT CHANGE UP (ref 3.5–5.3)
POTASSIUM SERPL-SCNC: 3.8 MMOL/L — SIGNIFICANT CHANGE UP (ref 3.5–5.3)
PROTHROM AB SERPL-ACNC: 35.2 SEC — HIGH (ref 9.8–12.7)
RBC # BLD: 2.95 M/UL — LOW (ref 3.8–5.2)
RBC # FLD: 15.4 % — HIGH (ref 10.3–14.5)
SODIUM SERPL-SCNC: 139 MMOL/L — SIGNIFICANT CHANGE UP (ref 135–145)
WBC # BLD: 5.2 K/UL — SIGNIFICANT CHANGE UP (ref 3.8–10.5)
WBC # FLD AUTO: 5.2 K/UL — SIGNIFICANT CHANGE UP (ref 3.8–10.5)

## 2018-03-30 PROCEDURE — 99232 SBSQ HOSP IP/OBS MODERATE 35: CPT

## 2018-03-30 RX ORDER — WARFARIN SODIUM 2.5 MG/1
5 TABLET ORAL ONCE
Qty: 0 | Refills: 0 | Status: DISCONTINUED | OUTPATIENT
Start: 2018-03-30 | End: 2018-03-30

## 2018-03-30 RX ADMIN — Medication 1 TABLET(S): at 13:43

## 2018-03-30 RX ADMIN — Medication 975 MILLIGRAM(S): at 22:34

## 2018-03-30 RX ADMIN — Medication 975 MILLIGRAM(S): at 06:47

## 2018-03-30 RX ADMIN — Medication 100 MILLIGRAM(S): at 13:37

## 2018-03-30 RX ADMIN — BUDESONIDE AND FORMOTEROL FUMARATE DIHYDRATE 2 PUFF(S): 160; 4.5 AEROSOL RESPIRATORY (INHALATION) at 17:20

## 2018-03-30 RX ADMIN — Medication 325 MILLIGRAM(S): at 17:20

## 2018-03-30 RX ADMIN — Medication 975 MILLIGRAM(S): at 23:13

## 2018-03-30 RX ADMIN — Medication 100 MILLIGRAM(S): at 06:08

## 2018-03-30 RX ADMIN — Medication 1 TABLET(S): at 23:52

## 2018-03-30 RX ADMIN — Medication 500 MILLIGRAM(S): at 17:20

## 2018-03-30 RX ADMIN — Medication 500 MILLIGRAM(S): at 06:08

## 2018-03-30 RX ADMIN — Medication 12.5 MILLIGRAM(S): at 17:20

## 2018-03-30 RX ADMIN — SODIUM CHLORIDE 3 MILLILITER(S): 9 INJECTION INTRAMUSCULAR; INTRAVENOUS; SUBCUTANEOUS at 22:29

## 2018-03-30 RX ADMIN — DAPTOMYCIN 136 MILLIGRAM(S): 500 INJECTION, POWDER, LYOPHILIZED, FOR SOLUTION INTRAVENOUS at 13:38

## 2018-03-30 RX ADMIN — Medication 1 MILLIGRAM(S): at 12:32

## 2018-03-30 RX ADMIN — CYCLOBENZAPRINE HYDROCHLORIDE 5 MILLIGRAM(S): 10 TABLET, FILM COATED ORAL at 17:20

## 2018-03-30 RX ADMIN — Medication 100 MILLIGRAM(S): at 22:31

## 2018-03-30 RX ADMIN — Medication 325 MILLIGRAM(S): at 12:31

## 2018-03-30 RX ADMIN — Medication 1 TABLET(S): at 06:08

## 2018-03-30 RX ADMIN — OXYCODONE HYDROCHLORIDE 5 MILLIGRAM(S): 5 TABLET ORAL at 23:13

## 2018-03-30 RX ADMIN — Medication 1 TABLET(S): at 12:32

## 2018-03-30 RX ADMIN — Medication 975 MILLIGRAM(S): at 06:08

## 2018-03-30 RX ADMIN — OXYCODONE HYDROCHLORIDE 5 MILLIGRAM(S): 5 TABLET ORAL at 09:20

## 2018-03-30 RX ADMIN — OXYCODONE HYDROCHLORIDE 5 MILLIGRAM(S): 5 TABLET ORAL at 13:00

## 2018-03-30 RX ADMIN — CYCLOBENZAPRINE HYDROCHLORIDE 5 MILLIGRAM(S): 10 TABLET, FILM COATED ORAL at 06:09

## 2018-03-30 RX ADMIN — PANTOPRAZOLE SODIUM 40 MILLIGRAM(S): 20 TABLET, DELAYED RELEASE ORAL at 12:32

## 2018-03-30 RX ADMIN — SIMVASTATIN 20 MILLIGRAM(S): 20 TABLET, FILM COATED ORAL at 22:31

## 2018-03-30 RX ADMIN — OXYCODONE HYDROCHLORIDE 5 MILLIGRAM(S): 5 TABLET ORAL at 12:30

## 2018-03-30 RX ADMIN — ENOXAPARIN SODIUM 120 MILLIGRAM(S): 100 INJECTION SUBCUTANEOUS at 06:08

## 2018-03-30 RX ADMIN — Medication 325 MILLIGRAM(S): at 08:50

## 2018-03-30 RX ADMIN — BUDESONIDE AND FORMOTEROL FUMARATE DIHYDRATE 2 PUFF(S): 160; 4.5 AEROSOL RESPIRATORY (INHALATION) at 06:08

## 2018-03-30 RX ADMIN — SODIUM CHLORIDE 3 MILLILITER(S): 9 INJECTION INTRAMUSCULAR; INTRAVENOUS; SUBCUTANEOUS at 06:10

## 2018-03-30 RX ADMIN — SODIUM CHLORIDE 3 MILLILITER(S): 9 INJECTION INTRAMUSCULAR; INTRAVENOUS; SUBCUTANEOUS at 13:35

## 2018-03-30 RX ADMIN — OXYCODONE HYDROCHLORIDE 5 MILLIGRAM(S): 5 TABLET ORAL at 22:29

## 2018-03-30 RX ADMIN — OXYCODONE HYDROCHLORIDE 5 MILLIGRAM(S): 5 TABLET ORAL at 08:50

## 2018-03-30 RX ADMIN — Medication 12.5 MILLIGRAM(S): at 06:08

## 2018-03-30 NOTE — PROGRESS NOTE ADULT - ATTENDING COMMENTS
I agree with the above note on this patient. All pertinent films have been reviewed. Please refer to clinical documentation of the history, physical examinations, data summary, and both assessment and plan as documented above and with which I agree.    1. hospitalist- appreciate their primary management on this medically complex patient.   2. INR goal 2-3, lovenox bridge to coumadin  3. h/h stable  4. cards - Dr. Roblero following, he spoke with EP who stated that abnormal tele results may be normal 2/2 ventricular paced ppm  5. renal - resolved kasia on ckd  6. id- on dapto for MRSA, has PICC  7. pain control with po meds   8. pt/ot - no knee ROM, PWB RLE, please mobilize oob to beside chair as able  9. wound care, drain d/c'd byplastics team, will need to arrange for daily dressing changes at SNF  10. dispo to snf when cleared by hospitalist team    Nate Bass MD  Attending Orthopedic Surgeon

## 2018-03-30 NOTE — PROGRESS NOTE ADULT - SUBJECTIVE AND OBJECTIVE BOX
Patient seen and examined.   No acute events overnight.   Pain well controlled.  Denies any Fever/Chills./CP/SOB  Tolerated transfusion well yesterday, appropriate response.    Vital Signs Last 24 Hrs  T(C): 37.1 (30 Mar 2018 06:13), Max: 37.5 (29 Mar 2018 21:57)  T(F): 98.8 (30 Mar 2018 06:13), Max: 99.5 (29 Mar 2018 21:57)  HR: 81 (30 Mar 2018 06:13) (77 - 88)  BP: 144/87 (30 Mar 2018 06:13) (134/70 - 147/55)  BP(mean): --  RR: 18 (30 Mar 2018 06:13) (16 - 18)  SpO2: 93% (29 Mar 2018 21:57) (93% - 99%)                        8.8    5.2   )-----------( 209      ( 30 Mar 2018 06:00 )             26.1       Gen: NAD  RLE  Dressing clean, dry, intact in [place] LIE, PASCUAL in place  EHL/FHL/TA/GS intact  SILT DP/SP/Stone/Sa  WWP distally               78 yo F s/p Removal of articulating abx cement spacer, Rev to Non articulating Spacer POD 7    Analgesia  FU ORCx, growing staph aureus  FU BCx- NGTD  FU ID recs  IV Dapto per ID, PICC'd 3/27  Avoid nephrotoxic meds  Wound Management per PRS  FU PASCUAL removal  DVT PPX per med  Incentive spirometry  50% PWB In KI at all times, no knee ROM allowed   PT/OT/OOB  d/w Dr. Bass  Dispo Planning- [facility] possible today, ortho stable, Please call office for appointment for FU with Dr Bass in 1 week Patient seen and examined.   No acute events overnight.   Pain well controlled.  Denies any Fever/Chills./CP/SOB  Tolerated transfusion well yesterday, appropriate response.    Vital Signs Last 24 Hrs  T(C): 37.1 (30 Mar 2018 06:13), Max: 37.5 (29 Mar 2018 21:57)  T(F): 98.8 (30 Mar 2018 06:13), Max: 99.5 (29 Mar 2018 21:57)  HR: 81 (30 Mar 2018 06:13) (77 - 88)  BP: 144/87 (30 Mar 2018 06:13) (134/70 - 147/55)  BP(mean): --  RR: 18 (30 Mar 2018 06:13) (16 - 18)  SpO2: 93% (29 Mar 2018 21:57) (93% - 99%)                        8.8    5.2   )-----------( 209      ( 30 Mar 2018 06:00 )             26.1       Gen: NAD  RLE  Dressing clean, dry, intact in [place] LIE, PASCUAL in place  EHL/FHL/TA/GS intact  SILT DP/SP/Stone/Sa  WWP distally               76 yo F s/p Removal of articulating abx cement spacer, Rev to Non articulating Spacer POD 7    Analgesia  FU ORCx, growing staph aureus  FU BCx- NGTD  FU ID recs  IV Dapto per ID, PICC'd 3/27  Avoid nephrotoxic meds  Wound Management per PRS  FU PASCUAL removal  DVT PPX per med  Incentive spirometry  50% PWB In KI at all times, no knee ROM allowed   PT/OT/OOB  d/w Dr. Bass  Dispo Planning- [facility] possible today, ortho stable, Please call office for appointment for FU with Dr Bass in 6 weeks

## 2018-03-30 NOTE — PROGRESS NOTE ADULT - SUBJECTIVE AND OBJECTIVE BOX
Cardiology/Vascular Medicine Inpatient Progress Note    Patient seen this morning  Complains of not being able to get out of bed on her own, being able to stand  Intermittent discomfort/pain  Not currently in pain  No cardiac complaints  INR subtherapeutic  Not on telemetry    Vital Signs Last 24 Hrs  T(C): 37.1 (30 Mar 2018 06:13), Max: 37.5 (29 Mar 2018 21:57)  T(F): 98.8 (30 Mar 2018 06:13), Max: 99.5 (29 Mar 2018 21:57)  HR: 81 (30 Mar 2018 06:13) (77 - 88)  BP: 144/87 (30 Mar 2018 06:13) (134/70 - 147/55)  BP(mean): --  RR: 18 (30 Mar 2018 06:13) (16 - 18)  SpO2: 93% (29 Mar 2018 21:57) (93% - 99%)    Appearance: NAD, chronically ill appearing, obese  HEENT:   Normal oral mucosa, PERRL, EOMI	  Lymphatic: No lymphadenopathy  Cardiovascular: Normal S1 S2, No JVD, + LE edema  Respiratory: Decreased BS b/l bases	  Psychiatry: Awake, alert  Gastrointestinal:  Soft, Non-tender, + BS	  Skin: No rashes, No ecchymoses, No cyanosis	  Neurologic: Non-focal  Extremities: +Rt knee brace, drainage in place    MEDICATIONS  (STANDING):  acetaminophen   Tablet. 975 milliGRAM(s) Oral every 8 hours  ascorbic acid 500 milliGRAM(s) Oral two times a day  buDESOnide 160 MICROgram(s)/formoterol 4.5 MICROgram(s) Inhaler 2 Puff(s) Inhalation two times a day  calcium carbonate 1250 mG + Vitamin D (OsCal 500 + D) 1 Tablet(s) Oral three times a day  cyclobenzaprine 5 milliGRAM(s) Oral two times a day  DAPTOmycin IVPB 900 milliGRAM(s) IV Intermittent every 24 hours  docusate sodium 100 milliGRAM(s) Oral three times a day  enoxaparin Injectable 120 milliGRAM(s) SubCutaneous every 12 hours  ferrous    sulfate 325 milliGRAM(s) Oral three times a day with meals  folic acid 1 milliGRAM(s) Oral daily  metoprolol tartrate 12.5 milliGRAM(s) Oral two times a day  multivitamin 1 Tablet(s) Oral daily  pantoprazole    Tablet 40 milliGRAM(s) Oral daily  polyethylene glycol 3350 17 Gram(s) Oral daily  simvastatin 20 milliGRAM(s) Oral at bedtime  sodium chloride 0.9% lock flush 3 milliLiter(s) IV Push every 8 hours    MEDICATIONS  (PRN):  acetaminophen   Tablet 650 milliGRAM(s) Oral every 6 hours PRN For Temp greater than 38 C (100.4 F)  ALPRAZolam 0.25 milliGRAM(s) Oral daily PRN anxiety  aluminum hydroxide/magnesium hydroxide/simethicone Suspension 30 milliLiter(s) Oral four times a day PRN Indigestion  benzocaine 15 mG/menthol 3.6 mG Lozenge 1 Lozenge Oral every 4 hours PRN Sore Throat  bisacodyl Suppository 10 milliGRAM(s) Rectal daily PRN If no bowel movement by postoperative day #2  magnesium hydroxide Suspension 30 milliLiter(s) Oral daily PRN Constipation  ondansetron Injectable 4 milliGRAM(s) IV Push every 6 hours PRN Nausea and/or Vomiting  oxyCODONE    IR 5 milliGRAM(s) Oral every 4 hours PRN Moderate Pain (4 - 6)  oxyCODONE    IR 10 milliGRAM(s) Oral every 4 hours PRN Severe Pain (7 - 10)  prochlorperazine   Injectable 5 milliGRAM(s) IV Push once PRN Nausea and/or Vomiting  senna 2 Tablet(s) Oral at bedtime PRN Constipation  zolpidem 5 milliGRAM(s) Oral at bedtime PRN Insomnia      LABS:	 	                                8.8    5.2   )-----------( 209      ( 30 Mar 2018 06:00 )             26.1       03-30    139  |  104  |  10  ----------------------------<  96  3.8   |  23  |  0.71    Ca    8.8      30 Mar 2018 06:00      PT/INR - ( 29 Mar 2018 07:43 )   PT: 20.3 sec;   INR: 1.78 ratio           < from: Limited Transthoracic Echo (12.27.17 @ 14:06) >    Patient name: IRASEMA KOHLER  YOB: 1940   Age: 77 (F)   MR#: [id number]  Study Date: 12/27/2017  Location: [id number]: Lauren Finkelstein  Study quality: Limited  Referring Physician: Sammy Galvan MD / Garo Sorto MD / Roverto Roblero MD  Blood Pressure: 139/52 mmHg  Height: 157 cm  Weight: 131 kg  BSA: 2.2 m2  ------------------------------------------------------------------------  PROCEDURE: Limited transthoracic echocardiogram with 2-D.  M-Mode and spectral and color flow Doppler.  INDICATION: Endocarditis, valve unspecified (I38)  ------------------------------------------------------------------------  OBSERVATIONS:  Mitral Valve: Mitral annular calcification and calcified  mitral leaflets with decreased diastolic opening. Peak  mitral valve gradient equals 14 mm Hg, mean transmitral  valve gradient equals 6 mm Hg, consistent with moderate  mitral stenosis.  Aortic Root: Normal aortic root, aortic arch and descending  thoracic aorta.  Aortic Valve:Bioprosthetic aortic valve replacement. s/p  TAVR.  Left Atrium: Normal left atrium.  Left Ventricle: Normal left ventricular systolic function.  No segmental wall motion abnormalities. Normal left  ventricular internal dimensions and wall thicknesses.  Right Heart: Normal right atrium. Normal right ventricular  size and function. Normal tricuspid valve. Normal pulmonic  valve.  Pericardium/PleuraNormal pericardium with no pericardial  effusion.  ------------------------------------------------------------------------  CONCLUSIONS:  1. Mitral annular calcification and calcified mitral  leaflets with decreased diastolic opening. Peak mitral  valve gradient equals 14 mm Hg, mean transmitral valve  gradient equals 6 mm Hg, consistent with moderate mitral  stenosis.  2. Bioprosthetic aortic valve replacement. s/p TAVR.  3. Normal left ventricular systolic function. No segmental  wall motion abnormalities.  *** Compared with echocardiogram of 12/19/2017, no  significant changes noted. There isno obvious evidence of  endocarditis.  ------------------------------------------------------------------------  Confirmed on  12/27/2017 - 17:13:04 by Filipe Esparza M.D.  ------------------------------------------------------------------------    < end of copied text >      < from: Xray Knee 1 or 2 Views, Right (03.23.18 @ 18:26) >    EXAM:  KNEE; 1 OR 2 VIEWS - RIGHT                            PROCEDURE DATE:  03/23/2018            INTERPRETATION:  INDICATION: post-op. Pain    COMPARISON: Knee radiographs dated 1/25/2018    FINDING: Two views of the knee demonstrates interval placement of an   intramedullary ruthann bridging the distal femur and proximal tibia. There is   cement material within the knee joint space. The findings are compatible   with arthrodesis and cement spacer placement. There are surgical clips,   soft tissue swelling and draining keeping with recent surgery.    IMPRESSION:   Interval postoperative changes of the knee with cement spacer and fusion   ruthann in place.        AARTI DIEGO M.D., ATTENDING RADIOLOGIST  This document has been electronically signed. Mar 24 2018 12:15AM        < end of copied text >      < from: Xray Chest 1 View- PORTABLE-Routine (03.26.18 @ 13:06) >  EXAM:  XR CHEST PORTABLE ROUTINE 1V                            PROCEDURE DATE:  03/26/2018            INTERPRETATION:  CLINICAL INDICATION: Shortness of breath. Rales.    TECHNIQUE: Single AP view of the chest was obtained on 3/26/2018.    COMPARISON: 3/25/2018.    FINDINGS: Elevation of the right hemidiaphragm. Left lower lung linear   opacity compatible with atelectasis. The heart size is normal. The bones   are intact.  Left chest pacemaker. Status post TAVR.    IMPRESSION: Left lower lung subsegmental atelectasis.        BRADY BANERJEE M.D., RADIOLOGY RESIDENT  This document has been electronically signed.  FELIPE TEMPLETON M.D., ATTENDING RADIOLOGIST  This document has been electronically signed. Mar 26 2018  3:56PM                < end of copied text >

## 2018-03-30 NOTE — PROGRESS NOTE ADULT - ASSESSMENT
A/P: 77F s/p R total knee insertion of spacer, irrigation and debridement, complex wound closure (POD 7)  - Pain control  -PASCUAL removed  - Care per primary team  - medihony to epidermolysis, to be changed daily, xeroform over incision      [phone]

## 2018-03-30 NOTE — PROGRESS NOTE ADULT - SUBJECTIVE AND OBJECTIVE BOX
comfrotable in bed    Vital Signs Last 24 Hrs  T(C): 37.1 (30 Mar 2018 06:13), Max: 37.5 (29 Mar 2018 21:57)  T(F): 98.8 (30 Mar 2018 06:13), Max: 99.5 (29 Mar 2018 21:57)  HR: 81 (30 Mar 2018 06:13) (77 - 88)  BP: 144/87 (30 Mar 2018 06:13) (134/70 - 147/55)  BP(mean): --  RR: 18 (30 Mar 2018 06:13) (16 - 18)  SpO2: 93% (29 Mar 2018 21:57) (93% - 99%)    GENERAL: NAD, AAOx3, obese  HEAD:  Atraumatic, Normocephalic  EYES: EOMI  NECK: Supple, No JVD  CHEST/LUNG: CTABL, No wheeze  HEART: Regular rate and rhythm; no murmur  ABDOMEN: Soft, NTND, NABS  EXTREMITIES:  PASCUAL removed; rt LE incision dressed with medihoney and xeroform  SKIN: No rashes or lesions  NEURO: No focal deficits    LABS:                        8.8    5.2   )-----------( 209      ( 30 Mar 2018 06:00 )             26.1     03-30    139  |  104  |  10  ----------------------------<  96  3.8   |  23  |  0.71    Ca    8.8      30 Mar 2018 06:00      PT/INR - ( 29 Mar 2018 07:43 )   PT: 20.3 sec;   INR: 1.78 ratio           CAPILLARY BLOOD GLUCOSE        CARDIAC MARKERS ( 29 Mar 2018 05:20 )  x     / x     / 20 U/L / x     / x

## 2018-03-30 NOTE — PROGRESS NOTE ADULT - SUBJECTIVE AND OBJECTIVE BOX
Plastic Surgery Progress Note    S: Patient seen and examined.  Not OOB yet.    Vital Signs Last 24 Hrs  T(C): 37.1 (03-30-18 @ 06:13), Max: 37.5 (03-29-18 @ 21:57)  T(F): 98.8 (03-30-18 @ 06:13), Max: 99.5 (03-29-18 @ 21:57)  HR: 81 (03-30-18 @ 06:13) (77 - 88)  BP: 144/87 (03-30-18 @ 06:13) (134/70 - 147/55)  BP(mean): --  RR: 18 (03-30-18 @ 06:13) (16 - 18)  SpO2: 93% (03-29-18 @ 21:57) (93% - 99%)  I&O's Detail    29 Mar 2018 07:01  -  30 Mar 2018 07:00  --------------------------------------------------------  IN:    Oral Fluid: 1140 mL  Total IN: 1140 mL    OUT:    VAC (Vacuum Assisted Closure) System: 12 mL  Total OUT: 12 mL    Total NET: 1128 mL      Physical Exam:  General: Awake and alert, NAD  R leg: Medial aspect of incision with ecchymosis and epidermolysis - stable. epidermolysis re-dressed with medihoney.  incision Dressed with xeroform.  Abd placed over all and wrapped. Drain serosanguinous, removed.

## 2018-03-30 NOTE — PROGRESS NOTE ADULT - ASSESSMENT
Clinically stable from the cardiac perspective.  No cardiac complaints.  Stable volume status.  Tolerating low dose beta blocker (metoprolol 12.5 BID)  Tolerating enoxaparin --> warfarin  Subtherapeutic INR this AM  On IV abx (daptomycin) as per primary team. s/p PICC      Will follow.    Roverto Roblero  Pager [phone]

## 2018-03-30 NOTE — PROGRESS NOTE ADULT - SUBJECTIVE AND OBJECTIVE BOX
CC: f/u for right tkr septic arthritis    Patient reports she is upset she has to move her room and she is having knee pain    REVIEW OF SYSTEMS:  All other review of systems negative (Comprehensive ROS)    Antimicrobials Day #  :6/42  DAPTOmycin IVPB 900 milliGRAM(s) IV Intermittent every 24 hours    Other Medications Reviewed    T(F): 98.8 (03-30-18 @ 06:13), Max: 99.5 (03-29-18 @ 21:57)  HR: 81 (03-30-18 @ 06:13)  BP: 144/87 (03-30-18 @ 06:13)  RR: 18 (03-30-18 @ 06:13)  SpO2: 93% (03-29-18 @ 21:57)  Wt(kg): --    PHYSICAL EXAM:  General: alert, in emotional acute distress  Eyes:  anicteric, no conjunctival injection, no discharge  Oropharynx: no lesions or injection 	  Neck: supple, without adenopathy  Lungs: clear to auscultation  Heart: s1s2 2/6 sys m  Abdomen: soft, nondistended, nontender, without mass or organomegaly  Skin: no lesions  Extremities: right leg wound purple coloration  Neurologic: alert, oriented, moves all extremities    LAB RESULTS:                        8.8    5.2   )-----------( 209      ( 30 Mar 2018 06:00 )             26.1     03-30    139  |  104  |  10  ----------------------------<  96  3.8   |  23  |  0.71    Ca    8.8      30 Mar 2018 06:00          MICROBIOLOGY:  RECENT CULTURES:  03-27 @ 02:11 .Blood Blood-Peripheral     No growth to date.          Assessment:  Patient with mrsa septic right tkr despite recent rudy, space, 6 weeks abx for strept sanguis tkr infection . She had poor wound healing as the reason so now had spacer exchange, fusion ruthann and plastic closure.   Plan: 6 weeks iv dapto  prolonged po keflex and minocycline after  local wound care per plastics and ortho

## 2018-03-31 LAB
ANION GAP SERPL CALC-SCNC: 11 MMOL/L — SIGNIFICANT CHANGE UP (ref 5–17)
BUN SERPL-MCNC: 9 MG/DL — SIGNIFICANT CHANGE UP (ref 7–23)
CALCIUM SERPL-MCNC: 9.3 MG/DL — SIGNIFICANT CHANGE UP (ref 8.4–10.5)
CHLORIDE SERPL-SCNC: 104 MMOL/L — SIGNIFICANT CHANGE UP (ref 96–108)
CO2 SERPL-SCNC: 26 MMOL/L — SIGNIFICANT CHANGE UP (ref 22–31)
CREAT SERPL-MCNC: 0.68 MG/DL — SIGNIFICANT CHANGE UP (ref 0.5–1.3)
GLUCOSE SERPL-MCNC: 105 MG/DL — HIGH (ref 70–99)
HCT VFR BLD CALC: 26.5 % — LOW (ref 34.5–45)
HGB BLD-MCNC: 8.5 G/DL — LOW (ref 11.5–15.5)
INR BLD: 2.6 RATIO — HIGH (ref 0.88–1.16)
MCHC RBC-ENTMCNC: 28.3 PG — SIGNIFICANT CHANGE UP (ref 27–34)
MCHC RBC-ENTMCNC: 32.1 GM/DL — SIGNIFICANT CHANGE UP (ref 32–36)
MCV RBC AUTO: 88.3 FL — SIGNIFICANT CHANGE UP (ref 80–100)
NRBC # BLD: 0 /100 WBCS — SIGNIFICANT CHANGE UP (ref 0–0)
PLATELET # BLD AUTO: 242 K/UL — SIGNIFICANT CHANGE UP (ref 150–400)
POTASSIUM SERPL-MCNC: 3.9 MMOL/L — SIGNIFICANT CHANGE UP (ref 3.5–5.3)
POTASSIUM SERPL-SCNC: 3.9 MMOL/L — SIGNIFICANT CHANGE UP (ref 3.5–5.3)
PROTHROM AB SERPL-ACNC: 30 SEC — HIGH (ref 10–13.1)
RBC # BLD: 3 M/UL — LOW (ref 3.8–5.2)
RBC # FLD: 17.6 % — HIGH (ref 10.3–14.5)
SODIUM SERPL-SCNC: 141 MMOL/L — SIGNIFICANT CHANGE UP (ref 135–145)
WBC # BLD: 4.87 K/UL — SIGNIFICANT CHANGE UP (ref 3.8–10.5)
WBC # FLD AUTO: 4.87 K/UL — SIGNIFICANT CHANGE UP (ref 3.8–10.5)

## 2018-03-31 PROCEDURE — 99232 SBSQ HOSP IP/OBS MODERATE 35: CPT

## 2018-03-31 RX ORDER — OXYCODONE HYDROCHLORIDE 5 MG/1
5 TABLET ORAL EVERY 4 HOURS
Qty: 0 | Refills: 0 | Status: DISCONTINUED | OUTPATIENT
Start: 2018-03-31 | End: 2018-04-04

## 2018-03-31 RX ORDER — WARFARIN SODIUM 2.5 MG/1
3 TABLET ORAL ONCE
Qty: 0 | Refills: 0 | Status: COMPLETED | OUTPATIENT
Start: 2018-03-31 | End: 2018-03-31

## 2018-03-31 RX ORDER — WARFARIN SODIUM 2.5 MG/1
4 TABLET ORAL ONCE
Qty: 0 | Refills: 0 | Status: DISCONTINUED | OUTPATIENT
Start: 2018-03-31 | End: 2018-03-31

## 2018-03-31 RX ORDER — SODIUM CHLORIDE 0.65 %
1 AEROSOL, SPRAY (ML) NASAL THREE TIMES A DAY
Qty: 0 | Refills: 0 | Status: DISCONTINUED | OUTPATIENT
Start: 2018-03-31 | End: 2018-04-04

## 2018-03-31 RX ADMIN — BUDESONIDE AND FORMOTEROL FUMARATE DIHYDRATE 2 PUFF(S): 160; 4.5 AEROSOL RESPIRATORY (INHALATION) at 07:08

## 2018-03-31 RX ADMIN — OXYCODONE HYDROCHLORIDE 5 MILLIGRAM(S): 5 TABLET ORAL at 14:28

## 2018-03-31 RX ADMIN — Medication 100 MILLIGRAM(S): at 22:01

## 2018-03-31 RX ADMIN — Medication 1 TABLET(S): at 11:50

## 2018-03-31 RX ADMIN — SODIUM CHLORIDE 3 MILLILITER(S): 9 INJECTION INTRAMUSCULAR; INTRAVENOUS; SUBCUTANEOUS at 13:06

## 2018-03-31 RX ADMIN — OXYCODONE HYDROCHLORIDE 5 MILLIGRAM(S): 5 TABLET ORAL at 07:18

## 2018-03-31 RX ADMIN — Medication 12.5 MILLIGRAM(S): at 07:00

## 2018-03-31 RX ADMIN — Medication 100 MILLIGRAM(S): at 07:00

## 2018-03-31 RX ADMIN — ONDANSETRON 4 MILLIGRAM(S): 8 TABLET, FILM COATED ORAL at 16:49

## 2018-03-31 RX ADMIN — WARFARIN SODIUM 3 MILLIGRAM(S): 2.5 TABLET ORAL at 22:04

## 2018-03-31 RX ADMIN — CYCLOBENZAPRINE HYDROCHLORIDE 5 MILLIGRAM(S): 10 TABLET, FILM COATED ORAL at 06:59

## 2018-03-31 RX ADMIN — Medication 1 TABLET(S): at 07:00

## 2018-03-31 RX ADMIN — Medication 325 MILLIGRAM(S): at 09:28

## 2018-03-31 RX ADMIN — DAPTOMYCIN 136 MILLIGRAM(S): 500 INJECTION, POWDER, LYOPHILIZED, FOR SOLUTION INTRAVENOUS at 13:07

## 2018-03-31 RX ADMIN — PANTOPRAZOLE SODIUM 40 MILLIGRAM(S): 20 TABLET, DELAYED RELEASE ORAL at 11:50

## 2018-03-31 RX ADMIN — SIMVASTATIN 20 MILLIGRAM(S): 20 TABLET, FILM COATED ORAL at 22:01

## 2018-03-31 RX ADMIN — OXYCODONE HYDROCHLORIDE 5 MILLIGRAM(S): 5 TABLET ORAL at 13:58

## 2018-03-31 RX ADMIN — OXYCODONE HYDROCHLORIDE 5 MILLIGRAM(S): 5 TABLET ORAL at 22:51

## 2018-03-31 RX ADMIN — OXYCODONE HYDROCHLORIDE 5 MILLIGRAM(S): 5 TABLET ORAL at 18:46

## 2018-03-31 RX ADMIN — SODIUM CHLORIDE 3 MILLILITER(S): 9 INJECTION INTRAMUSCULAR; INTRAVENOUS; SUBCUTANEOUS at 22:05

## 2018-03-31 RX ADMIN — Medication 325 MILLIGRAM(S): at 18:13

## 2018-03-31 RX ADMIN — Medication 325 MILLIGRAM(S): at 11:50

## 2018-03-31 RX ADMIN — Medication 1 SPRAY(S): at 18:16

## 2018-03-31 RX ADMIN — Medication 1 MILLIGRAM(S): at 11:49

## 2018-03-31 RX ADMIN — OXYCODONE HYDROCHLORIDE 5 MILLIGRAM(S): 5 TABLET ORAL at 07:48

## 2018-03-31 RX ADMIN — BUDESONIDE AND FORMOTEROL FUMARATE DIHYDRATE 2 PUFF(S): 160; 4.5 AEROSOL RESPIRATORY (INHALATION) at 18:14

## 2018-03-31 RX ADMIN — Medication 12.5 MILLIGRAM(S): at 18:13

## 2018-03-31 RX ADMIN — SODIUM CHLORIDE 3 MILLILITER(S): 9 INJECTION INTRAMUSCULAR; INTRAVENOUS; SUBCUTANEOUS at 06:57

## 2018-03-31 RX ADMIN — Medication 1 TABLET(S): at 13:07

## 2018-03-31 RX ADMIN — Medication 500 MILLIGRAM(S): at 07:00

## 2018-03-31 RX ADMIN — Medication 5 MILLIGRAM(S): at 15:04

## 2018-03-31 RX ADMIN — Medication 1 TABLET(S): at 22:01

## 2018-03-31 RX ADMIN — OXYCODONE HYDROCHLORIDE 5 MILLIGRAM(S): 5 TABLET ORAL at 18:16

## 2018-03-31 RX ADMIN — CYCLOBENZAPRINE HYDROCHLORIDE 5 MILLIGRAM(S): 10 TABLET, FILM COATED ORAL at 18:13

## 2018-03-31 RX ADMIN — Medication 100 MILLIGRAM(S): at 13:07

## 2018-03-31 NOTE — PROGRESS NOTE ADULT - ASSESSMENT
Clinically stable from the cardiac perspective.  No cardiac complaints.  Stable volume status.  Tolerating low dose beta blocker (metoprolol 12.5 BID)  Tolerating enoxaparin --> warfarin  Subtherapeutic INR this AM  On IV abx (daptomycin) as per primary team. s/p PICC      Will follow.    Roverto Roblero  Pager [phone] Clinically stable from the cardiac perspective.  No cardiac complaints.  Stable volume status.  Tolerating low dose beta blocker (metoprolol 12.5 BID)  Tolerating enoxaparin --> warfarin  INR slightly elevated today 3.15, can give lower dose today  On IV abx (daptomycin) as per primary team. s/p PICC, concurrent abx can affect INR      Will follow.    Roverto Roblero  Pager [phone]

## 2018-03-31 NOTE — PROGRESS NOTE ADULT - SUBJECTIVE AND OBJECTIVE BOX
Cardiology/Vascular Medicine Inpatient Progress Note    Patient seen this morning  Complains of not being able to get out of bed on her own, being able to stand  Intermittent discomfort/pain  Not currently in pain  No cardiac complaints  INR subtherapeutic  Not on telemetry    Vital Signs Last 24 Hrs  T(C): 36.9 (31 Mar 2018 05:45), Max: 37.2 (30 Mar 2018 20:45)  T(F): 98.5 (31 Mar 2018 05:45), Max: 99 (30 Mar 2018 20:45)  HR: 75 (31 Mar 2018 05:45) (75 - 88)  BP: 143/79 (31 Mar 2018 05:45) (113/72 - 144/76)  BP(mean): --  RR: 18 (31 Mar 2018 05:45) (18 - 18)  SpO2: 97% (31 Mar 2018 05:45) (90% - 97%)    Appearance: NAD, chronically ill appearing, obese  HEENT:   Normal oral mucosa, PERRL, EOMI	  Lymphatic: No lymphadenopathy  Cardiovascular: Normal S1 S2, No JVD, + LE edema  Respiratory: Decreased BS b/l bases	  Psychiatry: Awake, alert  Gastrointestinal:  Soft, Non-tender, + BS	  Skin: No rashes, No ecchymoses, No cyanosis	  Neurologic: Non-focal  Extremities: +Rt knee brace, drainage in place    MEDICATIONS  (STANDING):  acetaminophen   Tablet. 975 milliGRAM(s) Oral every 8 hours  ascorbic acid 500 milliGRAM(s) Oral two times a day  buDESOnide 160 MICROgram(s)/formoterol 4.5 MICROgram(s) Inhaler 2 Puff(s) Inhalation two times a day  calcium carbonate 1250 mG + Vitamin D (OsCal 500 + D) 1 Tablet(s) Oral three times a day  cyclobenzaprine 5 milliGRAM(s) Oral two times a day  DAPTOmycin IVPB 900 milliGRAM(s) IV Intermittent every 24 hours  docusate sodium 100 milliGRAM(s) Oral three times a day  ferrous    sulfate 325 milliGRAM(s) Oral three times a day with meals  folic acid 1 milliGRAM(s) Oral daily  metoprolol tartrate 12.5 milliGRAM(s) Oral two times a day  multivitamin 1 Tablet(s) Oral daily  pantoprazole    Tablet 40 milliGRAM(s) Oral daily  polyethylene glycol 3350 17 Gram(s) Oral daily  simvastatin 20 milliGRAM(s) Oral at bedtime  sodium chloride 0.9% lock flush 3 milliLiter(s) IV Push every 8 hours    MEDICATIONS  (PRN):  acetaminophen   Tablet 650 milliGRAM(s) Oral every 6 hours PRN For Temp greater than 38 C (100.4 F)  aluminum hydroxide/magnesium hydroxide/simethicone Suspension 30 milliLiter(s) Oral four times a day PRN Indigestion  benzocaine 15 mG/menthol 3.6 mG Lozenge 1 Lozenge Oral every 4 hours PRN Sore Throat  bisacodyl Suppository 10 milliGRAM(s) Rectal daily PRN If no bowel movement by postoperative day #2  magnesium hydroxide Suspension 30 milliLiter(s) Oral daily PRN Constipation  ondansetron Injectable 4 milliGRAM(s) IV Push every 6 hours PRN Nausea and/or Vomiting  oxyCODONE    IR 5 milliGRAM(s) Oral every 4 hours PRN Moderate Pain (4 - 6)  prochlorperazine   Injectable 5 milliGRAM(s) IV Push once PRN Nausea and/or Vomiting  senna 2 Tablet(s) Oral at bedtime PRN Constipation      LABS:	 	                                         8.8    5.2   )-----------( 209      ( 30 Mar 2018 06:00 )             26.1   03-31    141  |  104  |  9   ----------------------------<  105<H>  3.9   |  26  |  0.68    Ca    9.3      31 Mar 2018 07:19    PT/INR - ( 30 Mar 2018 16:35 )   PT: 35.2 sec;   INR: 3.15 ratio                < from: Limited Transthoracic Echo (12.27.17 @ 14:06) >    Patient name: IRASEMA KOHLER  YOB: 1940   Age: 77 (F)   MR#: [id number]  Study Date: 12/27/2017  Location: [id number]: Lauren Finkelstein  Study quality: Limited  Referring Physician: Sammy Galvan MD / Garo Sorto MD / Roverto Roblero MD  Blood Pressure: 139/52 mmHg  Height: 157 cm  Weight: 131 kg  BSA: 2.2 m2  ------------------------------------------------------------------------  PROCEDURE: Limited transthoracic echocardiogram with 2-D.  M-Mode and spectral and color flow Doppler.  INDICATION: Endocarditis, valve unspecified (I38)  ------------------------------------------------------------------------  OBSERVATIONS:  Mitral Valve: Mitral annular calcification and calcified  mitral leaflets with decreased diastolic opening. Peak  mitral valve gradient equals 14 mm Hg, mean transmitral  valve gradient equals 6 mm Hg, consistent with moderate  mitral stenosis.  Aortic Root: Normal aortic root, aortic arch and descending  thoracic aorta.  Aortic Valve:Bioprosthetic aortic valve replacement. s/p  TAVR.  Left Atrium: Normal left atrium.  Left Ventricle: Normal left ventricular systolic function.  No segmental wall motion abnormalities. Normal left  ventricular internal dimensions and wall thicknesses.  Right Heart: Normal right atrium. Normal right ventricular  size and function. Normal tricuspid valve. Normal pulmonic  valve.  Pericardium/PleuraNormal pericardium with no pericardial  effusion.  ------------------------------------------------------------------------  CONCLUSIONS:  1. Mitral annular calcification and calcified mitral  leaflets with decreased diastolic opening. Peak mitral  valve gradient equals 14 mm Hg, mean transmitral valve  gradient equals 6 mm Hg, consistent with moderate mitral  stenosis.  2. Bioprosthetic aortic valve replacement. s/p TAVR.  3. Normal left ventricular systolic function. No segmental  wall motion abnormalities.  *** Compared with echocardiogram of 12/19/2017, no  significant changes noted. There isno obvious evidence of  endocarditis.  ------------------------------------------------------------------------  Confirmed on  12/27/2017 - 17:13:04 by Filipe Esparza M.D.  ------------------------------------------------------------------------    < end of copied text >      < from: Xray Knee 1 or 2 Views, Right (03.23.18 @ 18:26) >    EXAM:  KNEE; 1 OR 2 VIEWS - RIGHT                            PROCEDURE DATE:  03/23/2018            INTERPRETATION:  INDICATION: post-op. Pain    COMPARISON: Knee radiographs dated 1/25/2018    FINDING: Two views of the knee demonstrates interval placement of an   intramedullary ruthann bridging the distal femur and proximal tibia. There is   cement material within the knee joint space. The findings are compatible   with arthrodesis and cement spacer placement. There are surgical clips,   soft tissue swelling and draining keeping with recent surgery.    IMPRESSION:   Interval postoperative changes of the knee with cement spacer and fusion   ruthann in place.        AARTI DIEGO M.D., ATTENDING RADIOLOGIST  This document has been electronically signed. Mar 24 2018 12:15AM        < end of copied text >      < from: Xray Chest 1 View- PORTABLE-Routine (03.26.18 @ 13:06) >  EXAM:  XR CHEST PORTABLE ROUTINE 1V                            PROCEDURE DATE:  03/26/2018            INTERPRETATION:  CLINICAL INDICATION: Shortness of breath. Rales.    TECHNIQUE: Single AP view of the chest was obtained on 3/26/2018.    COMPARISON: 3/25/2018.    FINDINGS: Elevation of the right hemidiaphragm. Left lower lung linear   opacity compatible with atelectasis. The heart size is normal. The bones   are intact.  Left chest pacemaker. Status post TAVR.    IMPRESSION: Left lower lung subsegmental atelectasis.        BRADY BANERJEE M.D., RADIOLOGY RESIDENT  This document has been electronically signed.  FELIPE TEMPLETON M.D., ATTENDING RADIOLOGIST  This document has been electronically signed. Mar 26 2018  3:56PM                < end of copied text > Cardiology/Vascular Medicine Inpatient Progress Note    Patient seen this morning  Complains of not being able to get out of bed on her own, being able to stand  Intermittent discomfort/pain  Not currently in pain  No cardiac complaints  INR mildly elevated at 3.15  Not on telemetry    Vital Signs Last 24 Hrs  T(C): 36.9 (31 Mar 2018 05:45), Max: 37.2 (30 Mar 2018 20:45)  T(F): 98.5 (31 Mar 2018 05:45), Max: 99 (30 Mar 2018 20:45)  HR: 75 (31 Mar 2018 05:45) (75 - 88)  BP: 143/79 (31 Mar 2018 05:45) (113/72 - 144/76)  BP(mean): --  RR: 18 (31 Mar 2018 05:45) (18 - 18)  SpO2: 97% (31 Mar 2018 05:45) (90% - 97%)    Appearance: NAD, chronically ill appearing, obese  HEENT:   Normal oral mucosa, PERRL, EOMI	  Lymphatic: No lymphadenopathy  Cardiovascular: Normal S1 S2, No JVD, + LE edema  Respiratory: Decreased BS b/l bases	  Psychiatry: Awake, alert  Gastrointestinal:  Soft, Non-tender, + BS	  Skin: No rashes, No ecchymoses, No cyanosis	  Neurologic: Non-focal  Extremities: +Rt knee brace, drainage in place    MEDICATIONS  (STANDING):  acetaminophen   Tablet. 975 milliGRAM(s) Oral every 8 hours  ascorbic acid 500 milliGRAM(s) Oral two times a day  buDESOnide 160 MICROgram(s)/formoterol 4.5 MICROgram(s) Inhaler 2 Puff(s) Inhalation two times a day  calcium carbonate 1250 mG + Vitamin D (OsCal 500 + D) 1 Tablet(s) Oral three times a day  cyclobenzaprine 5 milliGRAM(s) Oral two times a day  DAPTOmycin IVPB 900 milliGRAM(s) IV Intermittent every 24 hours  docusate sodium 100 milliGRAM(s) Oral three times a day  ferrous    sulfate 325 milliGRAM(s) Oral three times a day with meals  folic acid 1 milliGRAM(s) Oral daily  metoprolol tartrate 12.5 milliGRAM(s) Oral two times a day  multivitamin 1 Tablet(s) Oral daily  pantoprazole    Tablet 40 milliGRAM(s) Oral daily  polyethylene glycol 3350 17 Gram(s) Oral daily  simvastatin 20 milliGRAM(s) Oral at bedtime  sodium chloride 0.9% lock flush 3 milliLiter(s) IV Push every 8 hours    MEDICATIONS  (PRN):  acetaminophen   Tablet 650 milliGRAM(s) Oral every 6 hours PRN For Temp greater than 38 C (100.4 F)  aluminum hydroxide/magnesium hydroxide/simethicone Suspension 30 milliLiter(s) Oral four times a day PRN Indigestion  benzocaine 15 mG/menthol 3.6 mG Lozenge 1 Lozenge Oral every 4 hours PRN Sore Throat  bisacodyl Suppository 10 milliGRAM(s) Rectal daily PRN If no bowel movement by postoperative day #2  magnesium hydroxide Suspension 30 milliLiter(s) Oral daily PRN Constipation  ondansetron Injectable 4 milliGRAM(s) IV Push every 6 hours PRN Nausea and/or Vomiting  oxyCODONE    IR 5 milliGRAM(s) Oral every 4 hours PRN Moderate Pain (4 - 6)  prochlorperazine   Injectable 5 milliGRAM(s) IV Push once PRN Nausea and/or Vomiting  senna 2 Tablet(s) Oral at bedtime PRN Constipation      LABS:	 	                                         8.8    5.2   )-----------( 209      ( 30 Mar 2018 06:00 )             26.1   03-31    141  |  104  |  9   ----------------------------<  105<H>  3.9   |  26  |  0.68    Ca    9.3      31 Mar 2018 07:19    PT/INR - ( 30 Mar 2018 16:35 )   PT: 35.2 sec;   INR: 3.15 ratio                < from: Limited Transthoracic Echo (12.27.17 @ 14:06) >    Patient name: IRASEMA KOHLER  YOB: 1940   Age: 77 (F)   MR#: [id number]  Study Date: 12/27/2017  Location: [id number]: Lauren Finkelstein  Study quality: Limited  Referring Physician: Sammy Galvan MD / Garo Sorto MD / Roverto Roblero MD  Blood Pressure: 139/52 mmHg  Height: 157 cm  Weight: 131 kg  BSA: 2.2 m2  ------------------------------------------------------------------------  PROCEDURE: Limited transthoracic echocardiogram with 2-D.  M-Mode and spectral and color flow Doppler.  INDICATION: Endocarditis, valve unspecified (I38)  ------------------------------------------------------------------------  OBSERVATIONS:  Mitral Valve: Mitral annular calcification and calcified  mitral leaflets with decreased diastolic opening. Peak  mitral valve gradient equals 14 mm Hg, mean transmitral  valve gradient equals 6 mm Hg, consistent with moderate  mitral stenosis.  Aortic Root: Normal aortic root, aortic arch and descending  thoracic aorta.  Aortic Valve:Bioprosthetic aortic valve replacement. s/p  TAVR.  Left Atrium: Normal left atrium.  Left Ventricle: Normal left ventricular systolic function.  No segmental wall motion abnormalities. Normal left  ventricular internal dimensions and wall thicknesses.  Right Heart: Normal right atrium. Normal right ventricular  size and function. Normal tricuspid valve. Normal pulmonic  valve.  Pericardium/PleuraNormal pericardium with no pericardial  effusion.  ------------------------------------------------------------------------  CONCLUSIONS:  1. Mitral annular calcification and calcified mitral  leaflets with decreased diastolic opening. Peak mitral  valve gradient equals 14 mm Hg, mean transmitral valve  gradient equals 6 mm Hg, consistent with moderate mitral  stenosis.  2. Bioprosthetic aortic valve replacement. s/p TAVR.  3. Normal left ventricular systolic function. No segmental  wall motion abnormalities.  *** Compared with echocardiogram of 12/19/2017, no  significant changes noted. There isno obvious evidence of  endocarditis.  ------------------------------------------------------------------------  Confirmed on  12/27/2017 - 17:13:04 by Filipe Esparza M.D.  ------------------------------------------------------------------------    < end of copied text >      < from: Xray Knee 1 or 2 Views, Right (03.23.18 @ 18:26) >    EXAM:  KNEE; 1 OR 2 VIEWS - RIGHT                            PROCEDURE DATE:  03/23/2018            INTERPRETATION:  INDICATION: post-op. Pain    COMPARISON: Knee radiographs dated 1/25/2018    FINDING: Two views of the knee demonstrates interval placement of an   intramedullary ruthann bridging the distal femur and proximal tibia. There is   cement material within the knee joint space. The findings are compatible   with arthrodesis and cement spacer placement. There are surgical clips,   soft tissue swelling and draining keeping with recent surgery.    IMPRESSION:   Interval postoperative changes of the knee with cement spacer and fusion   ruthann in place.        AARTI DIEGO M.D., ATTENDING RADIOLOGIST  This document has been electronically signed. Mar 24 2018 12:15AM        < end of copied text >      < from: Xray Chest 1 View- PORTABLE-Routine (03.26.18 @ 13:06) >  EXAM:  XR CHEST PORTABLE ROUTINE 1V                            PROCEDURE DATE:  03/26/2018            INTERPRETATION:  CLINICAL INDICATION: Shortness of breath. Rales.    TECHNIQUE: Single AP view of the chest was obtained on 3/26/2018.    COMPARISON: 3/25/2018.    FINDINGS: Elevation of the right hemidiaphragm. Left lower lung linear   opacity compatible with atelectasis. The heart size is normal. The bones   are intact.  Left chest pacemaker. Status post TAVR.    IMPRESSION: Left lower lung subsegmental atelectasis.        BRADY BANERJEE M.D., RADIOLOGY RESIDENT  This document has been electronically signed.  FELIPE TEMPLETON M.D., ATTENDING RADIOLOGIST  This document has been electronically signed. Mar 26 2018  3:56PM                < end of copied text >

## 2018-03-31 NOTE — PROGRESS NOTE ADULT - ASSESSMENT
A/P: 77F s/p R total knee insertion of spacer, irrigation and debridement, complex wound closure (POD 7)  - Pain control  - Care per primary team  -OOB  xeroform daily to wound and incision      [id number]

## 2018-03-31 NOTE — PROGRESS NOTE ADULT - SUBJECTIVE AND OBJECTIVE BOX
Plastic Surgery Progress Note    S: Patient seen and examined.  Still not getting OOB.    Vital Signs Last 24 Hrs  T(C): 36.9 (03-31-18 @ 05:45), Max: 37.2 (03-30-18 @ 20:45)  T(F): 98.5 (03-31-18 @ 05:45), Max: 99 (03-30-18 @ 20:45)  HR: 75 (03-31-18 @ 05:45) (75 - 88)  BP: 143/79 (03-31-18 @ 05:45) (113/72 - 144/76)  BP(mean): --  RR: 18 (03-31-18 @ 05:45) (18 - 18)  SpO2: 97% (03-31-18 @ 05:45) (90% - 97%)  I&O's Detail    30 Mar 2018 07:01  -  31 Mar 2018 07:00  --------------------------------------------------------  IN:    Oral Fluid: 640 mL  Total IN: 640 mL    OUT:  Total OUT: 0 mL    Total NET: 640 mL        Physical Exam:  General: Awake and alert, NAD  R leg: Medial to incision ecchymotic with epidermolysis. Incision and raw surface dressed with xeroform.  Abd placed over all and wrapped.

## 2018-03-31 NOTE — PROGRESS NOTE ADULT - SUBJECTIVE AND OBJECTIVE BOX
reaonsably comfortably    Vital Signs Last 24 Hrs  T(C): 36.9 (31 Mar 2018 05:45), Max: 37.2 (30 Mar 2018 20:45)  T(F): 98.5 (31 Mar 2018 05:45), Max: 99 (30 Mar 2018 20:45)  HR: 75 (31 Mar 2018 05:45) (75 - 88)  BP: 143/79 (31 Mar 2018 05:45) (113/72 - 144/76)  BP(mean): --  RR: 18 (31 Mar 2018 05:45) (18 - 18)  SpO2: 97% (31 Mar 2018 05:45) (90% - 97%)    GENERAL: NAD, AAOx3, obese  HEAD:  Atraumatic, Normocephalic  EYES: EOMI  NECK: Supple, No JVD  CHEST/LUNG: CTABL, No wheeze  HEART: Regular rate and rhythm; no murmur  ABDOMEN: Soft, NTND, NABS  EXTREMITIES:  rt LE incision dressed with medihoney and xeroform  SKIN: No rashes or lesions  NEURO: No focal deficits    LABS:                        8.5    4.87  )-----------( 242      ( 31 Mar 2018 09:38 )             26.5     03-31    141  |  104  |  9   ----------------------------<  105<H>  3.9   |  26  |  0.68    Ca    9.3      31 Mar 2018 07:19      PT/INR - ( 31 Mar 2018 09:38 )   PT: 30.0 sec;   INR: 2.60 ratio           CAPILLARY BLOOD GLUCOSE

## 2018-04-01 LAB
CULTURE RESULTS: SIGNIFICANT CHANGE UP
CULTURE RESULTS: SIGNIFICANT CHANGE UP
INR BLD: 2.47 RATIO — HIGH (ref 0.88–1.16)
PROTHROM AB SERPL-ACNC: 28.4 SEC — HIGH (ref 10–13.1)
SPECIMEN SOURCE: SIGNIFICANT CHANGE UP
SPECIMEN SOURCE: SIGNIFICANT CHANGE UP

## 2018-04-01 PROCEDURE — 99232 SBSQ HOSP IP/OBS MODERATE 35: CPT

## 2018-04-01 RX ORDER — WARFARIN SODIUM 2.5 MG/1
4 TABLET ORAL ONCE
Qty: 0 | Refills: 0 | Status: COMPLETED | OUTPATIENT
Start: 2018-04-01 | End: 2018-04-01

## 2018-04-01 RX ADMIN — Medication 1 SPRAY(S): at 06:19

## 2018-04-01 RX ADMIN — Medication 500 MILLIGRAM(S): at 06:19

## 2018-04-01 RX ADMIN — SODIUM CHLORIDE 3 MILLILITER(S): 9 INJECTION INTRAMUSCULAR; INTRAVENOUS; SUBCUTANEOUS at 13:28

## 2018-04-01 RX ADMIN — Medication 1 MILLIGRAM(S): at 11:19

## 2018-04-01 RX ADMIN — WARFARIN SODIUM 4 MILLIGRAM(S): 2.5 TABLET ORAL at 22:42

## 2018-04-01 RX ADMIN — Medication 325 MILLIGRAM(S): at 18:17

## 2018-04-01 RX ADMIN — Medication 325 MILLIGRAM(S): at 08:20

## 2018-04-01 RX ADMIN — Medication 325 MILLIGRAM(S): at 13:28

## 2018-04-01 RX ADMIN — OXYCODONE HYDROCHLORIDE 5 MILLIGRAM(S): 5 TABLET ORAL at 11:17

## 2018-04-01 RX ADMIN — Medication 1 TABLET(S): at 06:19

## 2018-04-01 RX ADMIN — CYCLOBENZAPRINE HYDROCHLORIDE 5 MILLIGRAM(S): 10 TABLET, FILM COATED ORAL at 18:17

## 2018-04-01 RX ADMIN — Medication 12.5 MILLIGRAM(S): at 06:19

## 2018-04-01 RX ADMIN — Medication 12.5 MILLIGRAM(S): at 18:17

## 2018-04-01 RX ADMIN — Medication 975 MILLIGRAM(S): at 14:11

## 2018-04-01 RX ADMIN — Medication 100 MILLIGRAM(S): at 06:19

## 2018-04-01 RX ADMIN — OXYCODONE HYDROCHLORIDE 5 MILLIGRAM(S): 5 TABLET ORAL at 11:47

## 2018-04-01 RX ADMIN — Medication 1 TABLET(S): at 11:18

## 2018-04-01 RX ADMIN — Medication 100 MILLIGRAM(S): at 22:42

## 2018-04-01 RX ADMIN — Medication 500 MILLIGRAM(S): at 18:18

## 2018-04-01 RX ADMIN — BUDESONIDE AND FORMOTEROL FUMARATE DIHYDRATE 2 PUFF(S): 160; 4.5 AEROSOL RESPIRATORY (INHALATION) at 18:17

## 2018-04-01 RX ADMIN — Medication 1 TABLET(S): at 13:28

## 2018-04-01 RX ADMIN — OXYCODONE HYDROCHLORIDE 5 MILLIGRAM(S): 5 TABLET ORAL at 00:29

## 2018-04-01 RX ADMIN — Medication 975 MILLIGRAM(S): at 13:41

## 2018-04-01 RX ADMIN — POLYETHYLENE GLYCOL 3350 17 GRAM(S): 17 POWDER, FOR SOLUTION ORAL at 11:19

## 2018-04-01 RX ADMIN — OXYCODONE HYDROCHLORIDE 5 MILLIGRAM(S): 5 TABLET ORAL at 18:46

## 2018-04-01 RX ADMIN — SODIUM CHLORIDE 3 MILLILITER(S): 9 INJECTION INTRAMUSCULAR; INTRAVENOUS; SUBCUTANEOUS at 22:44

## 2018-04-01 RX ADMIN — Medication 100 MILLIGRAM(S): at 13:28

## 2018-04-01 RX ADMIN — DAPTOMYCIN 136 MILLIGRAM(S): 500 INJECTION, POWDER, LYOPHILIZED, FOR SOLUTION INTRAVENOUS at 13:27

## 2018-04-01 RX ADMIN — SIMVASTATIN 20 MILLIGRAM(S): 20 TABLET, FILM COATED ORAL at 22:43

## 2018-04-01 RX ADMIN — BUDESONIDE AND FORMOTEROL FUMARATE DIHYDRATE 2 PUFF(S): 160; 4.5 AEROSOL RESPIRATORY (INHALATION) at 06:19

## 2018-04-01 RX ADMIN — Medication 1 TABLET(S): at 22:42

## 2018-04-01 RX ADMIN — PANTOPRAZOLE SODIUM 40 MILLIGRAM(S): 20 TABLET, DELAYED RELEASE ORAL at 11:19

## 2018-04-01 RX ADMIN — SODIUM CHLORIDE 3 MILLILITER(S): 9 INJECTION INTRAMUSCULAR; INTRAVENOUS; SUBCUTANEOUS at 06:29

## 2018-04-01 RX ADMIN — CYCLOBENZAPRINE HYDROCHLORIDE 5 MILLIGRAM(S): 10 TABLET, FILM COATED ORAL at 06:19

## 2018-04-01 NOTE — PROGRESS NOTE ADULT - ASSESSMENT
A/P: 77F s/p R total knee insertion of spacer, irrigation and debridement, complex wound closure (POD 9)  - Pain control  - Care per primary team  - OOB  - xeroform daily to wound and incision      [id number]

## 2018-04-01 NOTE — PROGRESS NOTE ADULT - ASSESSMENT
Clinically stable from the cardiac perspective.  No cardiac complaints.  Stable volume status.  Tolerating low dose beta blocker (metoprolol 12.5 BID)  Tolerating enoxaparin --> warfarin  INR therapeutic today, to receive 4 mg warfarin tonight  On IV abx (daptomycin) as per primary team. s/p PICC, concurrent abx can affect INR      Will follow.    Roverto Roblero  Pager [phone]

## 2018-04-01 NOTE — PROGRESS NOTE ADULT - SUBJECTIVE AND OBJECTIVE BOX
Cardiology/Vascular Medicine Inpatient Progress Note    Patient seen this morning  Intermittent discomfort/pain  Not currently in pain  No cardiac complaints  INR 2.47, receiving 4 mg warfarin tonight  Not on telemetry    Vital Signs Last 24 Hrs  T(C): 36.8 (01 Apr 2018 06:15), Max: 37 (31 Mar 2018 15:07)  T(F): 98.3 (01 Apr 2018 06:15), Max: 98.6 (31 Mar 2018 15:07)  HR: 67 (01 Apr 2018 06:15) (67 - 88)  BP: 125/76 (01 Apr 2018 06:15) (116/67 - 144/79)  BP(mean): --  RR: 18 (01 Apr 2018 06:15) (18 - 19)  SpO2: 98% (01 Apr 2018 06:15) (95% - 98%)    Appearance: NAD, chronically ill appearing, obese  HEENT:   Normal oral mucosa, PERRL, EOMI	  Lymphatic: No lymphadenopathy  Cardiovascular: Normal S1 S2, No JVD, + LE edema  Respiratory: Decreased BS b/l bases	  Psychiatry: Awake, alert  Gastrointestinal:  Soft, Non-tender, + BS	  Skin: No rashes, No ecchymoses, No cyanosis	  Neurologic: Non-focal  Extremities: +Rt knee brace, drainage in place    MEDICATIONS  (STANDING):  acetaminophen   Tablet. 975 milliGRAM(s) Oral every 8 hours  ascorbic acid 500 milliGRAM(s) Oral two times a day  buDESOnide 160 MICROgram(s)/formoterol 4.5 MICROgram(s) Inhaler 2 Puff(s) Inhalation two times a day  calcium carbonate 1250 mG + Vitamin D (OsCal 500 + D) 1 Tablet(s) Oral three times a day  cyclobenzaprine 5 milliGRAM(s) Oral two times a day  DAPTOmycin IVPB 900 milliGRAM(s) IV Intermittent every 24 hours  docusate sodium 100 milliGRAM(s) Oral three times a day  ferrous    sulfate 325 milliGRAM(s) Oral three times a day with meals  folic acid 1 milliGRAM(s) Oral daily  metoprolol tartrate 12.5 milliGRAM(s) Oral two times a day  multivitamin 1 Tablet(s) Oral daily  pantoprazole    Tablet 40 milliGRAM(s) Oral daily  polyethylene glycol 3350 17 Gram(s) Oral daily  simvastatin 20 milliGRAM(s) Oral at bedtime  sodium chloride 0.9% lock flush 3 milliLiter(s) IV Push every 8 hours  warfarin 4 milliGRAM(s) Oral once    MEDICATIONS  (PRN):  acetaminophen   Tablet 650 milliGRAM(s) Oral every 6 hours PRN For Temp greater than 38 C (100.4 F)  aluminum hydroxide/magnesium hydroxide/simethicone Suspension 30 milliLiter(s) Oral four times a day PRN Indigestion  benzocaine 15 mG/menthol 3.6 mG Lozenge 1 Lozenge Oral every 4 hours PRN Sore Throat  bisacodyl Suppository 10 milliGRAM(s) Rectal daily PRN If no bowel movement by postoperative day #2  magnesium hydroxide Suspension 30 milliLiter(s) Oral daily PRN Constipation  ondansetron Injectable 4 milliGRAM(s) IV Push every 6 hours PRN Nausea and/or Vomiting  oxyCODONE    IR 5 milliGRAM(s) Oral every 4 hours PRN Moderate Pain (4 - 6)  senna 2 Tablet(s) Oral at bedtime PRN Constipation  sodium chloride 0.65% Nasal 1 Spray(s) Both Nostrils three times a day PRN Nasal Congestion      LABS:	 	                                         8.5    4.87  )-----------( 242      ( 31 Mar 2018 09:38 )             26.5   03-31    141  |  104  |  9   ----------------------------<  105<H>  3.9   |  26  |  0.68    Ca    9.3      31 Mar 2018 07:19    PT/INR - ( 01 Apr 2018 08:08 )   PT: 28.4 sec;   INR: 2.47 ratio                   < from: Limited Transthoracic Echo (12.27.17 @ 14:06) >    Patient name: IRASEMA KOHLER  YOB: 1940   Age: 77 (F)   MR#: [id number]  Study Date: 12/27/2017  Location: [id number]: Lauren Finkelstein  Study quality: Limited  Referring Physician: Sammy Galvan MD / Garo Sorto MD / Roverto Roblero MD  Blood Pressure: 139/52 mmHg  Height: 157 cm  Weight: 131 kg  BSA: 2.2 m2  ------------------------------------------------------------------------  PROCEDURE: Limited transthoracic echocardiogram with 2-D.  M-Mode and spectral and color flow Doppler.  INDICATION: Endocarditis, valve unspecified (I38)  ------------------------------------------------------------------------  OBSERVATIONS:  Mitral Valve: Mitral annular calcification and calcified  mitral leaflets with decreased diastolic opening. Peak  mitral valve gradient equals 14 mm Hg, mean transmitral  valve gradient equals 6 mm Hg, consistent with moderate  mitral stenosis.  Aortic Root: Normal aortic root, aortic arch and descending  thoracic aorta.  Aortic Valve:Bioprosthetic aortic valve replacement. s/p  TAVR.  Left Atrium: Normal left atrium.  Left Ventricle: Normal left ventricular systolic function.  No segmental wall motion abnormalities. Normal left  ventricular internal dimensions and wall thicknesses.  Right Heart: Normal right atrium. Normal right ventricular  size and function. Normal tricuspid valve. Normal pulmonic  valve.  Pericardium/PleuraNormal pericardium with no pericardial  effusion.  ------------------------------------------------------------------------  CONCLUSIONS:  1. Mitral annular calcification and calcified mitral  leaflets with decreased diastolic opening. Peak mitral  valve gradient equals 14 mm Hg, mean transmitral valve  gradient equals 6 mm Hg, consistent with moderate mitral  stenosis.  2. Bioprosthetic aortic valve replacement. s/p TAVR.  3. Normal left ventricular systolic function. No segmental  wall motion abnormalities.  *** Compared with echocardiogram of 12/19/2017, no  significant changes noted. There isno obvious evidence of  endocarditis.  ------------------------------------------------------------------------  Confirmed on  12/27/2017 - 17:13:04 by Filipe Esparza M.D.  ------------------------------------------------------------------------    < end of copied text >      < from: Xray Knee 1 or 2 Views, Right (03.23.18 @ 18:26) >    EXAM:  KNEE; 1 OR 2 VIEWS - RIGHT                            PROCEDURE DATE:  03/23/2018            INTERPRETATION:  INDICATION: post-op. Pain    COMPARISON: Knee radiographs dated 1/25/2018    FINDING: Two views of the knee demonstrates interval placement of an   intramedullary ruthann bridging the distal femur and proximal tibia. There is   cement material within the knee joint space. The findings are compatible   with arthrodesis and cement spacer placement. There are surgical clips,   soft tissue swelling and draining keeping with recent surgery.    IMPRESSION:   Interval postoperative changes of the knee with cement spacer and fusion   ruthann in place.        AARTI DIEGO M.D., ATTENDING RADIOLOGIST  This document has been electronically signed. Mar 24 2018 12:15AM        < end of copied text >      < from: Xray Chest 1 View- PORTABLE-Routine (03.26.18 @ 13:06) >  EXAM:  XR CHEST PORTABLE ROUTINE 1V                            PROCEDURE DATE:  03/26/2018            INTERPRETATION:  CLINICAL INDICATION: Shortness of breath. Rales.    TECHNIQUE: Single AP view of the chest was obtained on 3/26/2018.    COMPARISON: 3/25/2018.    FINDINGS: Elevation of the right hemidiaphragm. Left lower lung linear   opacity compatible with atelectasis. The heart size is normal. The bones   are intact.  Left chest pacemaker. Status post TAVR.    IMPRESSION: Left lower lung subsegmental atelectasis.        BRADY BANERJEE M.D., RADIOLOGY RESIDENT  This document has been electronically signed.  FELIPE TEMPLETON M.D., ATTENDING RADIOLOGIST  This document has been electronically signed. Mar 26 2018  3:56PM                < end of copied text >

## 2018-04-01 NOTE — PROGRESS NOTE ADULT - SUBJECTIVE AND OBJECTIVE BOX
CC: f/u for mrsa septic prosthetic joint and strept constellatus infected prosthetic joint    Patient reports  she is very happy today  REVIEW OF SYSTEMS:  All other review of systems negative (Comprehensive ROS)    Antimicrobials Day #  :8/42  DAPTOmycin IVPB 900 milliGRAM(s) IV Intermittent every 24 hours    Other Medications Reviewed    T(F): 98.2 (04-01-18 @ 13:26), Max: 98.4 (03-31-18 @ 20:51)  HR: 76 (04-01-18 @ 18:15)  BP: 127/76 (04-01-18 @ 18:15)  RR: 16 (04-01-18 @ 18:15)  SpO2: 96% (04-01-18 @ 18:15)  Wt(kg): --    PHYSICAL EXAM:  General: alert, no acute distress  Eyes:  anicteric, no conjunctival injection, no discharge  Oropharynx: no lesions or injection 	  Neck: supple, without adenopathy  Lungs: clear to auscultation  Heart: regular rate and rhythm; no murmur, rubs or gallops  Abdomen: soft, nondistended, nontender, without mass or organomegaly, obese  Skin: no lesions  Extremities: right leg is wrapped  Neurologic: alert, oriented, moves all extremities    LAB RESULTS:                        8.5    4.87  )-----------( 242      ( 31 Mar 2018 09:38 )             26.5     03-31    141  |  104  |  9   ----------------------------<  105<H>  3.9   |  26  |  0.68    Ca    9.3      31 Mar 2018 07:19    cpk 20      MICROBIOLOGY:  RECENT CULTURES:  Culture - Blood (03.27.18 @ 02:11)    Specimen Source: .Blood Blood-Peripheral    Culture Results:   No growth at 5 days.    Culture - Tissue with Gram Stain (03.24.18 @ 03:53)    -  Moxifloxacin(Aerobic): R >4    -  Linezolid: S 2    -  Gentamicin: S 2    -  Levofloxacin: R >4    -  Erythromycin: R >4    -  Daptomycin: S 0.5    -  Ciprofloxacin: R >2    -  Clindamycin: R >4    -  Cefazolin: R >16    Gram Stain:   Few polymorphonuclear leukocytes seen per low power field  No organisms seen    -  Ampicillin/Sulbactam: R 16/8    -  Vancomycin: S 1    -  Trimethoprim/Sulfamethoxazole: S <=0.5/9.5    -  Tetra/Doxy: S 2    -  RIF- Rifampin: S <=1    -  Penicillin: R >8    -  Oxacillin: R >2    Specimen Source: .Tissue Other, synovium #1    Culture Results:   Rare Methicillin resistant Staphylococcus aureus    Organism Identification: Methicillin resistant Staphylococcus aureus    Organism: Methicillin resistant Staphylococcus aureus    Method Type: EDWARD        Assessment:  Patient s/p right tkr strept constellatus septic arthritis tx with rudy, spacer, prolonged iv then po abx returns with poorly healing wound and mrsa infected prosthetic joint/spacer now s/p exchange of spacer and had fusion ruthann and plastics closure of wound.   Plan: complete 6 weeks of iv dapto and then prolonged po keflex and minocycline  local wound care per plastics  monitor cpd weekly

## 2018-04-01 NOTE — PROGRESS NOTE ADULT - SUBJECTIVE AND OBJECTIVE BOX
Patient seen and examined. Pain controlled. 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 975 milliGRAM(s) Oral every 8 hours  ascorbic acid 500 milliGRAM(s) Oral two times a day  buDESOnide 160 MICROgram(s)/formoterol 4.5 MICROgram(s) Inhaler 2 Puff(s) Inhalation two times a day  calcium carbonate 1250 mG + Vitamin D (OsCal 500 + D) 1 Tablet(s) Oral three times a day  cyclobenzaprine 5 milliGRAM(s) Oral two times a day  DAPTOmycin IVPB 900 milliGRAM(s) IV Intermittent every 24 hours  docusate sodium 100 milliGRAM(s) Oral three times a day  ferrous    sulfate 325 milliGRAM(s) Oral three times a day with meals  folic acid 1 milliGRAM(s) Oral daily  metoprolol tartrate 12.5 milliGRAM(s) Oral two times a day  multivitamin 1 Tablet(s) Oral daily  pantoprazole    Tablet 40 milliGRAM(s) Oral daily  polyethylene glycol 3350 17 Gram(s) Oral daily  simvastatin 20 milliGRAM(s) Oral at bedtime  sodium chloride 0.9% lock flush 3 milliLiter(s) IV Push every 8 hours    Allergies    amoxicillin (Other)    Intolerances    IV Contrast (Flushing (Skin); Nausea)                          8.5    4.87  )-----------( 242      ( 31 Mar 2018 09:38 )             26.5     31 Mar 2018 07:19    141    |  104    |  9      ----------------------------<  105    3.9     |  26     |  0.68     Ca    9.3        31 Mar 2018 07:19      PT/INR - ( 31 Mar 2018 09:38 )   PT: 30.0 sec;   INR: 2.60 ratio           Vital Signs Last 24 Hrs  T(C): 36.8 (04-01-18 @ 06:15), Max: 37 (03-31-18 @ 15:07)  T(F): 98.3 (04-01-18 @ 06:15), Max: 98.6 (03-31-18 @ 15:07)  HR: 67 (04-01-18 @ 06:15) (67 - 88)  BP: 125/76 (04-01-18 @ 06:15) (116/67 - 144/79)  BP(mean): --  RR: 18 (04-01-18 @ 06:15) (18 - 19)  SpO2: 98% (04-01-18 @ 06:15) (95% - 98%)    Physical Exam  Gen: NAD  RLE:  Dressing c/d/i  +ehl/fhl/ta/gs function  L2-S1 silt  Dp/pt pulse intact  No calf ttp  Compartments soft    A/P: 77y Female sp R TKA revision w/plastics closure  Pain control  DVT ppx  PT  50% PWB, navarro locked in extension, no ROM  FU labs  Ice/elevate  Cont abx  Medical management appreciated  Incentive spirometry  Dispo planning

## 2018-04-01 NOTE — PROGRESS NOTE ADULT - SUBJECTIVE AND OBJECTIVE BOX
Plastic Surgery Progress Note    S: Patient seen and examined.  no new events.    Vital Signs Last 24 Hrs  T(C): 36.8 (01 Apr 2018 06:15), Max: 37 (31 Mar 2018 15:07)  T(F): 98.3 (01 Apr 2018 06:15), Max: 98.6 (31 Mar 2018 15:07)  HR: 67 (01 Apr 2018 06:15) (67 - 88)  BP: 125/76 (01 Apr 2018 06:15) (116/67 - 144/79)  BP(mean): --  RR: 18 (01 Apr 2018 06:15) (18 - 19)  SpO2: 98% (01 Apr 2018 06:15) (95% - 98%)  I&O's Detail    30 Mar 2018 07:01  -  31 Mar 2018 07:00  --------------------------------------------------------  IN:    Oral Fluid: 640 mL  Total IN: 640 mL    OUT:  Total OUT: 0 mL    Total NET: 640 mL      31 Mar 2018 07:01  -  01 Apr 2018 06:39  --------------------------------------------------------  IN:    Oral Fluid: 780 mL  Total IN: 780 mL    OUT:  Total OUT: 0 mL    Total NET: 780 mL          Physical Exam:  General: Awake and alert, NAD  R leg: Medial to incision ecchymotic with epidermolysis. Incision and raw surface dressed with xeroform.  Abd placed over all and wrapped.

## 2018-04-01 NOTE — PROGRESS NOTE ADULT - ATTENDING COMMENTS
I agree with the above note on this patient. All pertinent films have been reviewed. Please refer to clinical documentation of the history, physical examinations, data summary, and both assessment and plan as documented above and with which I agree.    pending coordination with case management for snf discharge  wound care per plastics  appreciate care by primary team    Nate Bass MD  Attending Orthopedic Surgeon

## 2018-04-01 NOTE — PROGRESS NOTE ADULT - SUBJECTIVE AND OBJECTIVE BOX
feels dry in both nares    Vital Signs Last 24 Hrs  T(C): 36.8 (01 Apr 2018 06:15), Max: 37 (31 Mar 2018 15:07)  T(F): 98.3 (01 Apr 2018 06:15), Max: 98.6 (31 Mar 2018 15:07)  HR: 67 (01 Apr 2018 06:15) (67 - 88)  BP: 125/76 (01 Apr 2018 06:15) (116/67 - 144/79)  BP(mean): --  RR: 18 (01 Apr 2018 06:15) (18 - 19)  SpO2: 98% (01 Apr 2018 06:15) (95% - 98%)    GENERAL: NAD, AAOx3, obese  HEAD:  Atraumatic, Normocephalic  EYES: EOMI  NECK: Supple, No JVD  CHEST/LUNG: CTABL, No wheeze  HEART: Regular rate and rhythm; no murmur  ABDOMEN: Soft, NTND, NABS  EXTREMITIES:  rt LE incision dressed with medihoney and xeroform  SKIN: No rashes or lesions  NEURO: No focal deficits    LABS:                        8.5    4.87  )-----------( 242      ( 31 Mar 2018 09:38 )             26.5     03-31    141  |  104  |  9   ----------------------------<  105<H>  3.9   |  26  |  0.68    Ca    9.3      31 Mar 2018 07:19      PT/INR - ( 01 Apr 2018 08:08 )   PT: 28.4 sec;   INR: 2.47 ratio           CAPILLARY BLOOD GLUCOSE

## 2018-04-02 ENCOUNTER — TRANSCRIPTION ENCOUNTER (OUTPATIENT)
Age: 78
End: 2018-04-02

## 2018-04-02 LAB
HCT VFR BLD CALC: 26.7 % — LOW (ref 34.5–45)
HGB BLD-MCNC: 8.9 G/DL — LOW (ref 11.5–15.5)
INR BLD: 3.54 RATIO — HIGH (ref 0.88–1.16)
MCHC RBC-ENTMCNC: 29.7 PG — SIGNIFICANT CHANGE UP (ref 27–34)
MCHC RBC-ENTMCNC: 33.4 GM/DL — SIGNIFICANT CHANGE UP (ref 32–36)
MCV RBC AUTO: 88.9 FL — SIGNIFICANT CHANGE UP (ref 80–100)
PLATELET # BLD AUTO: 281 K/UL — SIGNIFICANT CHANGE UP (ref 150–400)
PROTHROM AB SERPL-ACNC: 39.2 SEC — HIGH (ref 9.8–12.7)
RBC # BLD: 3 M/UL — LOW (ref 3.8–5.2)
RBC # FLD: 15.8 % — HIGH (ref 10.3–14.5)
WBC # BLD: 6 K/UL — SIGNIFICANT CHANGE UP (ref 3.8–10.5)
WBC # FLD AUTO: 6 K/UL — SIGNIFICANT CHANGE UP (ref 3.8–10.5)

## 2018-04-02 RX ADMIN — CYCLOBENZAPRINE HYDROCHLORIDE 5 MILLIGRAM(S): 10 TABLET, FILM COATED ORAL at 05:56

## 2018-04-02 RX ADMIN — SODIUM CHLORIDE 3 MILLILITER(S): 9 INJECTION INTRAMUSCULAR; INTRAVENOUS; SUBCUTANEOUS at 05:58

## 2018-04-02 RX ADMIN — OXYCODONE HYDROCHLORIDE 5 MILLIGRAM(S): 5 TABLET ORAL at 22:10

## 2018-04-02 RX ADMIN — Medication 975 MILLIGRAM(S): at 13:35

## 2018-04-02 RX ADMIN — Medication 100 MILLIGRAM(S): at 13:05

## 2018-04-02 RX ADMIN — PANTOPRAZOLE SODIUM 40 MILLIGRAM(S): 20 TABLET, DELAYED RELEASE ORAL at 11:09

## 2018-04-02 RX ADMIN — SIMVASTATIN 20 MILLIGRAM(S): 20 TABLET, FILM COATED ORAL at 21:25

## 2018-04-02 RX ADMIN — Medication 1 TABLET(S): at 21:25

## 2018-04-02 RX ADMIN — Medication 500 MILLIGRAM(S): at 05:56

## 2018-04-02 RX ADMIN — SODIUM CHLORIDE 3 MILLILITER(S): 9 INJECTION INTRAMUSCULAR; INTRAVENOUS; SUBCUTANEOUS at 13:09

## 2018-04-02 RX ADMIN — OXYCODONE HYDROCHLORIDE 5 MILLIGRAM(S): 5 TABLET ORAL at 01:41

## 2018-04-02 RX ADMIN — OXYCODONE HYDROCHLORIDE 5 MILLIGRAM(S): 5 TABLET ORAL at 14:54

## 2018-04-02 RX ADMIN — CYCLOBENZAPRINE HYDROCHLORIDE 5 MILLIGRAM(S): 10 TABLET, FILM COATED ORAL at 17:15

## 2018-04-02 RX ADMIN — Medication 12.5 MILLIGRAM(S): at 05:56

## 2018-04-02 RX ADMIN — POLYETHYLENE GLYCOL 3350 17 GRAM(S): 17 POWDER, FOR SOLUTION ORAL at 11:09

## 2018-04-02 RX ADMIN — SODIUM CHLORIDE 3 MILLILITER(S): 9 INJECTION INTRAMUSCULAR; INTRAVENOUS; SUBCUTANEOUS at 21:19

## 2018-04-02 RX ADMIN — Medication 325 MILLIGRAM(S): at 13:04

## 2018-04-02 RX ADMIN — Medication 325 MILLIGRAM(S): at 17:14

## 2018-04-02 RX ADMIN — Medication 1 TABLET(S): at 13:05

## 2018-04-02 RX ADMIN — Medication 325 MILLIGRAM(S): at 07:47

## 2018-04-02 RX ADMIN — DAPTOMYCIN 136 MILLIGRAM(S): 500 INJECTION, POWDER, LYOPHILIZED, FOR SOLUTION INTRAVENOUS at 13:42

## 2018-04-02 RX ADMIN — Medication 12.5 MILLIGRAM(S): at 17:15

## 2018-04-02 RX ADMIN — Medication 975 MILLIGRAM(S): at 13:05

## 2018-04-02 RX ADMIN — Medication 100 MILLIGRAM(S): at 21:25

## 2018-04-02 RX ADMIN — OXYCODONE HYDROCHLORIDE 5 MILLIGRAM(S): 5 TABLET ORAL at 08:15

## 2018-04-02 RX ADMIN — Medication 500 MILLIGRAM(S): at 17:14

## 2018-04-02 RX ADMIN — Medication 100 MILLIGRAM(S): at 05:56

## 2018-04-02 RX ADMIN — Medication 1 MILLIGRAM(S): at 11:09

## 2018-04-02 RX ADMIN — BUDESONIDE AND FORMOTEROL FUMARATE DIHYDRATE 2 PUFF(S): 160; 4.5 AEROSOL RESPIRATORY (INHALATION) at 05:56

## 2018-04-02 RX ADMIN — OXYCODONE HYDROCHLORIDE 5 MILLIGRAM(S): 5 TABLET ORAL at 15:03

## 2018-04-02 RX ADMIN — Medication 1 TABLET(S): at 11:09

## 2018-04-02 RX ADMIN — Medication 1 TABLET(S): at 05:56

## 2018-04-02 RX ADMIN — OXYCODONE HYDROCHLORIDE 5 MILLIGRAM(S): 5 TABLET ORAL at 21:23

## 2018-04-02 RX ADMIN — OXYCODONE HYDROCHLORIDE 5 MILLIGRAM(S): 5 TABLET ORAL at 00:41

## 2018-04-02 RX ADMIN — BUDESONIDE AND FORMOTEROL FUMARATE DIHYDRATE 2 PUFF(S): 160; 4.5 AEROSOL RESPIRATORY (INHALATION) at 17:14

## 2018-04-02 RX ADMIN — OXYCODONE HYDROCHLORIDE 5 MILLIGRAM(S): 5 TABLET ORAL at 07:46

## 2018-04-02 NOTE — DISCHARGE NOTE ADULT - CARE PROVIDERS DIRECT ADDRESSES
,DirectAddress_Unknown,[email or web address],DirectAddress_Unknown,DirectAddress_Unknown

## 2018-04-02 NOTE — DISCHARGE NOTE ADULT - OTHER SIGNIFICANT FINDINGS
Attending DC note:  68y F PMHx HTN HLD CAD PCI SSS s/p PPM TAVR presents w/ right knee joint infection admitted to ortho developed wide complex tachycardia blood loss anemia. INR supratherapeutic, reversed with vitamin K. sp 4 units prbc. wound clx grew mrsa, ID recs for PICC and dapto for 6 wks -> then suppressive minocycline/keflex. had low grade temp night before discharge. to cont trending temp curve at rehab. discharged to [facility].

## 2018-04-02 NOTE — DISCHARGE NOTE ADULT - CARE PLAN
Principal Discharge DX:	Staphylococcal arthritis of right knee  Secondary Diagnosis:	Acute kidney injury superimposed on chronic kidney disease  Secondary Diagnosis:	Anemia due to blood loss  Secondary Diagnosis:	Mild intermittent asthma without complication Principal Discharge DX:	Staphylococcal arthritis of right knee  Assessment and plan of treatment:	Antibiotics as above  Follow up with ID, plastics, orthopedics, your primary care physician and rehab physician   return to emergency to fever, increased pain, chest pain, difficulty breathing or worsening  elevate and ice as needed  Secondary Diagnosis:	Acute kidney injury superimposed on chronic kidney disease  Goal:	resolved  Assessment and plan of treatment:	Continue to follow up with your physician  Secondary Diagnosis:	Anemia due to blood loss  Goal:	no further blood loss  Assessment and plan of treatment:	your were given blood transfusions, follow up hemoglobin with your physician  Secondary Diagnosis:	Mild intermittent asthma without complication  Goal:	no difficulty breathing  Assessment and plan of treatment:	follow up with Dr Kumar Principal Discharge DX:	Staphylococcal arthritis of right knee  Assessment and plan of treatment:	Antibiotics as above    Please continue with local wound care by applying xeroform gauze to the right knee, covering with ABD pads and wrapping with an ACE bandage prior to reapplying   Follow up with ID, plastics, orthopedics, your primary care physician and rehab physician   return to emergency to fever, increased pain, chest pain, difficulty breathing or worsening  elevate and ice as needed  Secondary Diagnosis:	Acute kidney injury superimposed on chronic kidney disease  Goal:	resolved  Assessment and plan of treatment:	Continue to follow up with your physician  Secondary Diagnosis:	Anemia due to blood loss  Goal:	no further blood loss  Assessment and plan of treatment:	your were given blood transfusions, follow up hemoglobin with your physician  Secondary Diagnosis:	Mild intermittent asthma without complication  Goal:	no difficulty breathing  Assessment and plan of treatment:	follow up with Dr Kumar Principal Discharge DX:	Staphylococcal arthritis of right knee  Assessment and plan of treatment:	Antibiotics as above  Follow up with ID, plastics, orthopedics, your primary care physician and rehab physician   return to emergency to fever, increased pain, chest pain, difficulty breathing or worsening  elevate and ice as needed  Secondary Diagnosis:	Acute kidney injury superimposed on chronic kidney disease  Goal:	resolved  Assessment and plan of treatment:	Continue to follow up with your physician  Secondary Diagnosis:	Anemia due to blood loss  Goal:	no further blood loss  Assessment and plan of treatment:	your were given blood transfusions, follow up hemoglobin with your physician  Secondary Diagnosis:	Mild intermittent asthma without complication  Goal:	no difficulty breathing  Assessment and plan of treatment:	follow up with Dr Kumar  Goal:	Wound Care  Assessment and plan of treatment:	Please continue with daily local wound care by applying xeroform gauze to the right knee, covering with ABD pads and wrapping with an ACE bandage prior to reapplying knee immobilizer.  NO bedside debridement should be performed.  Please follow up with Dr. Mg for continued wound management after discharge Principal Discharge DX:	Staphylococcal arthritis of right knee  Goal:	resolution of MRSA on completion of ABX.  Assessment and plan of treatment:	Antibiotics as above- IV Daptomycin till 5/5/18 followed by suppressive therapy 5/5/18 with minocycline and/or keflex.   Weekly CBC with differential, CMP and CPK.   Follow up with ID, plastics, orthopedics, your primary care physician and rehab physician   return to emergency to fever, increased pain, chest pain, difficulty breathing or worsening  elevate and ice as needed  50% PWD, no knee ROM.  Secondary Diagnosis:	Acute kidney injury superimposed on chronic kidney disease  Goal:	resolved  Assessment and plan of treatment:	Continue to follow up with your physician  Secondary Diagnosis:	Anemia due to blood loss  Goal:	no further blood loss  Assessment and plan of treatment:	your were given blood transfusions, follow up hemoglobin with your physician  Secondary Diagnosis:	Mild intermittent asthma without complication  Goal:	no difficulty breathing  Assessment and plan of treatment:	follow up with Dr Kumar  Goal:	Wound Care  Assessment and plan of treatment:	Please continue with daily local wound care by applying xeroform gauze to the right knee, covering with ABD pads and wrapping with an ACE bandage prior to reapplying knee immobilizer.  NO bedside debridement should be performed.  Please follow up with Dr. Mg for continued wound management after discharge Principal Discharge DX:	Staphylococcal arthritis of right knee  Goal:	resolution of MRSA on completion of ABX.  Assessment and plan of treatment:	Antibiotics as above- IV Daptomycin till 5/5/18 followed by suppressive therapy 5/5/18 with minocycline and/or keflex.   Weekly CBC with differential, CMP and CPK.   Follow up with ID, plastics, orthopedics, your primary care physician and rehab physician   return to emergency to fever, increased pain, chest pain, difficulty breathing or worsening  elevate and ice as needed  50% PWD, no knee ROM.  Secondary Diagnosis:	Acute kidney injury superimposed on chronic kidney disease  Goal:	resolved  Assessment and plan of treatment:	Continue to follow up with your physician  Avoid taking (NSAIDs) - (ex: Ibuprofen, Advil, Celebrex, Naprosyn)  Avoid taking any nephrotoxic agents (can harm kidneys) - Intravenous contrast for diagnostic testing, combination cold medications.  Have all medications adjusted for your renal function by your Health Care Provider.  Blood pressure control is important.  Take all medication as prescribed.  Secondary Diagnosis:	Anemia due to blood loss  Goal:	no further blood loss  Assessment and plan of treatment:	your were given blood transfusions, follow up hemoglobin with your physician  give iron supplement with meals and vitamin C  Secondary Diagnosis:	Mild intermittent asthma without complication  Goal:	no difficulty breathing  Assessment and plan of treatment:	follow up with Dr Kumar  Secondary Diagnosis:	DVT (deep venous thrombosis)  Assessment and plan of treatment:	Take your "blood thinners" as prescribed.  Walking is encouraged, increase activity as tolerated.  If you develop new leg pain, swelling, and/or redness contact your healthcare provider.  If you develop new chest pain with difficulty breathing, a rapid heart rate and/or a feeling of passing out call emergency medical services 911.  Coumadin as ordered - need to do PT/INR tomorrow AM  Goal:	Wound Care  Assessment and plan of treatment:	debridement should be performed.  Please follow up with Dr. Mg for continued wound management after discharge  Please continue with daily local wound care by applying xeroform gauze to the right knee, covering with ABD pads and wrapping with an ACE bandage prior to reapplying knee immobilizer.  NO bedside

## 2018-04-02 NOTE — DISCHARGE NOTE ADULT - PATIENT PORTAL LINK FT
You can access the [facility], offered by [facility], by registering with the following website: [email or web address]

## 2018-04-02 NOTE — PROGRESS NOTE ADULT - ATTENDING COMMENTS
I agree with the above note on this patient. All pertinent films have been reviewed. Please refer to clinical documentation of the history, physical examinations, data summary, and both assessment and plan as documented above and with which I agree.    no knee rom allowed  dispo to snf when medical team deems safe    Nate Bass MD  Attending Orthopedic Surgeon

## 2018-04-02 NOTE — PROGRESS NOTE ADULT - ASSESSMENT
Failed prosthetic joint infection  S/P fusion and removal of spacer  Recurrent infection, strep and know MRSA  Continue dapto  Weekly labs and CPK  Eventual stepdown to oral MCN and cephalexin.  Stable from ID viewpoint

## 2018-04-02 NOTE — DISCHARGE NOTE ADULT - PLAN OF CARE
Antibiotics as above  Follow up with ID, plastics, orthopedics, your primary care physician and rehab physician   return to emergency to fever, increased pain, chest pain, difficulty breathing or worsening  elevate and ice as needed resolved Continue to follow up with your physician no further blood loss your were given blood transfusions, follow up hemoglobin with your physician no difficulty breathing follow up with Dr Kumar Antibiotics as above    Please continue with local wound care by applying xeroform gauze to the right knee, covering with ABD pads and wrapping with an ACE bandage prior to reapplying   Follow up with ID, plastics, orthopedics, your primary care physician and rehab physician   return to emergency to fever, increased pain, chest pain, difficulty breathing or worsening  elevate and ice as needed Wound Care Please continue with daily local wound care by applying xeroform gauze to the right knee, covering with ABD pads and wrapping with an ACE bandage prior to reapplying knee immobilizer.  NO bedside debridement should be performed.  Please follow up with Dr. Mg for continued wound management after discharge resolution of MRSA on completion of ABX. Antibiotics as above- IV Daptomycin till 5/5/18 followed by suppressive therapy 5/5/18 with minocycline and/or keflex.   Weekly CBC with differential, CMP and CPK.   Follow up with ID, plastics, orthopedics, your primary care physician and rehab physician   return to emergency to fever, increased pain, chest pain, difficulty breathing or worsening  elevate and ice as needed  50% PWD, no knee ROM. Continue to follow up with your physician  Avoid taking (NSAIDs) - (ex: Ibuprofen, Advil, Celebrex, Naprosyn)  Avoid taking any nephrotoxic agents (can harm kidneys) - Intravenous contrast for diagnostic testing, combination cold medications.  Have all medications adjusted for your renal function by your Health Care Provider.  Blood pressure control is important.  Take all medication as prescribed. your were given blood transfusions, follow up hemoglobin with your physician  give iron supplement with meals and vitamin C Take your "blood thinners" as prescribed.  Walking is encouraged, increase activity as tolerated.  If you develop new leg pain, swelling, and/or redness contact your healthcare provider.  If you develop new chest pain with difficulty breathing, a rapid heart rate and/or a feeling of passing out call emergency medical services [phone].  Coumadin as ordered - need to do PT/INR tomorrow AM debridement should be performed.  Please follow up with Dr. Mg for continued wound management after discharge  Please continue with daily local wound care by applying xeroform gauze to the right knee, covering with ABD pads and wrapping with an ACE bandage prior to reapplying knee immobilizer.  NO bedside

## 2018-04-02 NOTE — DISCHARGE NOTE ADULT - MEDICATION SUMMARY - MEDICATIONS TO STOP TAKING
I will STOP taking the medications listed below when I get home from the hospital:    predniSONE 2.5 mg oral tablet  -- 1 tab(s) by mouth once a day    ALPRAZolam 0.25 mg oral tablet  -- 1 tab(s) by mouth once a day, As needed, anxiety    Xanax 0.25 mg oral tablet  -- 1 tab(s) by mouth once a day, As Needed    eszopiclone 3 mg oral tablet  -- 1 tab(s) by mouth once a day (at bedtime), As Needed I will STOP taking the medications listed below when I get home from the hospital:    predniSONE 2.5 mg oral tablet  -- 1 tab(s) by mouth once a day    ALPRAZolam 0.25 mg oral tablet  -- 1 tab(s) by mouth once a day, As needed, anxiety    Xanax 0.25 mg oral tablet  -- 1 tab(s) by mouth once a day, As Needed    eszopiclone 3 mg oral tablet  -- 1 tab(s) by mouth once a day (at bedtime), As Needed    Tylenol 500 mg oral tablet  -- 2 tab(s) by mouth every 6 hours, As Needed

## 2018-04-02 NOTE — DISCHARGE NOTE ADULT - HOSPITAL COURSE
77 year old Female presented to [facility] for a Right Total Knee spacer exchange on 3/23/18. Pt with extensive PMH including HLD, GERD, Anxiety, Anemia, CAD s/p HERBERT, severe AS (s/p TAVR & PPM 12/17 [employer] Model [id number] last interrogated 11/30/17, appointment on 3/22/18 to check PPM as per Cardiology office), h/o MVR, PMR on chronic steroids, asthma, OA, s/p TKR with recent joint infection s/p explant and abx spacer on 12/23/17, pt sent to rehab and was receiving DVT prophylaxis. Pt developed nosebleed post operatively and prophylaxis was D/C'd, pt then developed RLE DVT and is now on Coumadin. Pt to see cardiologist on 3/15/18 to transition from Coumadin to Lovenox prior to scheduled surgery.  Patient was Admitted. On 3/23/2018 had: Right knee explantation of previously placed articulating antibiotic spacer, irrigation and debridement of the knee, placement of static antibiotic spacer, with a Plastic Surgery soft tissue complex wound closure.   Pt was consulted by ID: complete 6 weeks of iv daptomycin and then prolonged po keflex and minocycline, local wound care per plastics  monitor CMP weekly  Orthopedics consulted: continue antibiotics as per ID and wound treatment as per plastics. Ice, elevate  Pt was consulted by Cardiology for short episode of Vtach: Pt started on low dose Beta blocker and tolerated. Coumadin for DVT. Monitor INR.  Discharge planning 77 year old Female presented to [facility] for a Right Total Knee spacer exchange on 3/23/18. Pt with extensive PMH including HLD, GERD, Anxiety, Anemia, CAD s/p HERBERT, severe AS (s/p TAVR & PPM 12/17 [employer] Model [id number] last interrogated 11/30/17, appointment on 3/22/18 to check PPM as per Cardiology office), h/o MVR, PMR on chronic steroids, asthma, OA, s/p TKR with recent joint infection s/p explant and abx spacer on 12/23/17, pt sent to rehab and was receiving DVT prophylaxis. Pt developed nosebleed post operatively and prophylaxis was D/C'd, pt then developed RLE DVT and is now on Coumadin. Pt to see cardiologist on 3/15/18 to transition from Coumadin to Lovenox prior to scheduled surgery.  Patient was Admitted. On 3/23/2018 had: Right knee explantation of previously placed articulating antibiotic spacer, irrigation and debridement of the knee, placement of static antibiotic spacer, with a Plastic Surgery soft tissue complex wound closure.   Pt was consulted by ID: complete 6 weeks of iv daptomycin and then prolonged po keflex and minocycline, local wound care per plastics monitor CMP, CBC with differential and CPK weekly. Pt has 31 days of Daptomycin to be completed on 5/5/18 followed by suppressive therapy of minocycline and/or keflex.  Orthopedics consulted: continue antibiotics as per ID and wound treatment as per plastics. Ice, elevate 50% PWD. Pt currently no knee ROM.   Pt was consulted by Cardiology for short episode of Vtach: Pt started on low dose Beta blocker and tolerated. Coumadin for DVT. Monitor INR.

## 2018-04-02 NOTE — PROGRESS NOTE ADULT - SUBJECTIVE AND OBJECTIVE BOX
Plastic Surgery    SUBJECTIVE:   Patient frustrated with inability to ambulate.  Otherwise no acute events    VITALS  T(C): 36.5 (04-02-18 @ 05:52), Max: 36.8 (04-01-18 @ 13:26)  HR: 85 (04-02-18 @ 05:52) (73 - 85)  BP: 134/73 (04-02-18 @ 05:52) (115/73 - 134/73)  BP(mean): --  RR: 17 (04-02-18 @ 05:52) (16 - 20)  SpO2: 92% (04-02-18 @ 05:52) (91% - 97%)  Wt(kg): --  CAPILLARY BLOOD GLUCOSE          Is/Os    03-31 @ 07:01  -  04-01 @ 07:00  --------------------------------------------------------  IN:    Oral Fluid: 780 mL  Total IN: 780 mL    OUT:  Total OUT: 0 mL    Total NET: 780 mL      04-01 @ 07:01  -  04-02 @ 06:32  --------------------------------------------------------  IN:    Oral Fluid: 300 mL  Total IN: 300 mL    OUT:  Total OUT: 0 mL    Total NET: 300 mL          PHYSICAL EXAM:   General: NAD, Lying in bed comfortably  Extrem: RLE with ecchymosis extending across anterior surface of right knee joint with area of eschar measuring approximately 4x6cm, incision intact with bruising along distal staple line. No collections, no purulence    MEDICATIONS (STANDING): acetaminophen   Tablet. 975 milliGRAM(s) Oral every 8 hours  ascorbic acid 500 milliGRAM(s) Oral two times a day  buDESOnide 160 MICROgram(s)/formoterol 4.5 MICROgram(s) Inhaler 2 Puff(s) Inhalation two times a day  calcium carbonate 1250 mG + Vitamin D (OsCal 500 + D) 1 Tablet(s) Oral three times a day  cyclobenzaprine 5 milliGRAM(s) Oral two times a day  DAPTOmycin IVPB 900 milliGRAM(s) IV Intermittent every 24 hours  docusate sodium 100 milliGRAM(s) Oral three times a day  ferrous    sulfate 325 milliGRAM(s) Oral three times a day with meals  folic acid 1 milliGRAM(s) Oral daily  metoprolol tartrate 12.5 milliGRAM(s) Oral two times a day  multivitamin 1 Tablet(s) Oral daily  pantoprazole    Tablet 40 milliGRAM(s) Oral daily  polyethylene glycol 3350 17 Gram(s) Oral daily  simvastatin 20 milliGRAM(s) Oral at bedtime  sodium chloride 0.9% lock flush 3 milliLiter(s) IV Push every 8 hours    MEDICATIONS (PRN):acetaminophen   Tablet 650 milliGRAM(s) Oral every 6 hours PRN For Temp greater than 38 C (100.4 F)  aluminum hydroxide/magnesium hydroxide/simethicone Suspension 30 milliLiter(s) Oral four times a day PRN Indigestion  bisacodyl Suppository 10 milliGRAM(s) Rectal daily PRN If no bowel movement by postoperative day #2  magnesium hydroxide Suspension 30 milliLiter(s) Oral daily PRN Constipation  ondansetron Injectable 4 milliGRAM(s) IV Push every 6 hours PRN Nausea and/or Vomiting  oxyCODONE    IR 5 milliGRAM(s) Oral every 4 hours PRN Moderate Pain (4 - 6)  senna 2 Tablet(s) Oral at bedtime PRN Constipation

## 2018-04-02 NOTE — PROGRESS NOTE ADULT - ASSESSMENT
A/P 77F s/p R total knee insertion of spacer, irrigation and debridement, complex wound closure (POD 10)  - Appreciate ID note, long term abx with dapto followed by PO minocycline and keflex  - Pain control  - DVT ppx; cont  - Care/dispo per primary  - OOB  - Pulmonary toilet  - Xeroform to area of epidermolysis daily    Vandana Mesa PGY2   P [phone]

## 2018-04-02 NOTE — DISCHARGE NOTE ADULT - MEDICATION SUMMARY - MEDICATIONS TO TAKE
I will START or STAY ON the medications listed below when I get home from the hospital:    oxyCODONE 5 mg oral tablet  -- 1 tab(s) by mouth every 4 hours, As needed, Moderate Pain (4 - 6)  -- Indication: For Moderate pain management    acetaminophen 325 mg oral tablet  -- 2 tab(s) by mouth every 6 hours, As needed, For Temp greater than 38 C (100.4 F)  -- Indication: For Mild pain managment    aspirin 81 mg oral delayed release tablet  -- 1 tab(s) by mouth once a day  -- Indication: For Cardiac protection    Coumadin 2 mg oral tablet  -- 1 tab(s) by mouth once a day (at bedtime). Check PT/INR in the AM 4/5/18.  -- Indication: For DVT prevention. S/P knee replacement.     simvastatin 20 mg oral tablet  -- 1 tab(s) by mouth once a day (at bedtime)  -- Indication: For HLD    metoprolol  -- 12.5 milligram(s) by mouth 2 times a day  -- Indication: For HTN    Advair Diskus 250 mcg-50 mcg inhalation powder  -- 1 puff(s) inhaled 2 times a day  -- Indication: For Asthma    ferrous sulfate 324 mg (65 mg elemental iron) oral tablet  -- 1 tab(s) by mouth 2 times a day with meals  -- Indication: For JESSICA    docusate sodium 100 mg oral capsule  -- 1 cap(s) by mouth 3 times a day  -- Indication: For Constipation    senna oral tablet  -- 2 tab(s) by mouth once a day (at bedtime), As needed, Constipation  -- Indication: For Constipation    DAPTOmycin 500 mg intravenous injection  -- 900 milligram(s) intravenous every 24 hours. GIve through 5/5/2018. Followed by suppressive therapy of minocycline and/or Keflex.   -- Indication: For MRSA    pantoprazole 40 mg oral delayed release tablet  -- 1 tab(s) by mouth once a day (before a meal)  -- Indication: For GERD    Multiple Vitamins oral tablet  -- 1 tab(s) by mouth once a day  -- Indication: For Supplement    calcium-vitamin D 500 mg-200 intl units oral tablet  -- 1 tab(s) by mouth 3 times a day  -- Indication: For Supplement    folic acid 1 mg oral tablet  -- 1 tab(s) by mouth once a day  -- Indication: For Supplement    Vitamin C 500 mg oral capsule  -- 1 cap(s) by mouth 2 times a day with ferrous sulfate  -- Indication: For Supplement, JESSICA. I will START or STAY ON the medications listed below when I get home from the hospital:    aspirin 81 mg oral delayed release tablet  -- 1 tab(s) by mouth once a day  -- Indication: For Cardiac protection    oxyCODONE 5 mg oral tablet  -- 1 tab(s) by mouth every 4 hours, As needed, Moderate Pain (4 - 6)  -- Indication: For Moderate pain management    acetaminophen 325 mg oral tablet  -- 3 tab(s) = 975 mg  by mouth every 8 hours around the clock  -- Indication: For pain management    Coumadin 2 mg oral tablet  -- 1 tab(s) by mouth once a day (at bedtime). Check PT/INR in the AM 4/5/18.  -- Indication: For DVT prevention. S/P knee replacement.     simvastatin 20 mg oral tablet  -- 1 tab(s) by mouth once a day (at bedtime)  -- Indication: For HLD    metoprolol  -- 12.5 milligram(s) by mouth 2 times a day  -- Indication: For HTN    Advair Diskus 250 mcg-50 mcg inhalation powder  -- 1 puff(s) inhaled 2 times a day  -- Indication: For Asthma    ferrous sulfate 324 mg (65 mg elemental iron) oral tablet  -- 1 tab(s) by mouth 2 times a day with meals  -- Indication: For JESSICA    polyethylene glycol 3350 oral powder for reconstitution  -- 17 gram(s) by mouth once a day  -- Indication: For Constipation    docusate sodium 100 mg oral capsule  -- 1 cap(s) by mouth 3 times a day  -- Indication: For Costipation    senna oral tablet  -- 2 tab(s) by mouth once a day (at bedtime), As needed, Constipation  -- Indication: For Constipation    DAPTOmycin 500 mg intravenous injection  -- 900 milligram(s) intravenous every 24 hours. GIve through 5/5/2018. Followed by suppressive therapy of minocycline and/or Keflex.   -- Indication: For MRSA    cyclobenzaprine 5 mg oral tablet  -- 1 tab(s) by mouth 2 times a day  -- Indication: For Muscle relaxant    pantoprazole 40 mg oral delayed release tablet  -- 1 tab(s) by mouth once a day (1/2 hour before breakfast)  -- Indication: For Stomach protection    Multiple Vitamins oral tablet  -- 1 tab(s) by mouth once a day  -- Indication: For Supplement    calcium-vitamin D 500 mg-200 intl units oral tablet  -- 1 tab(s) by mouth 3 times a day  -- Indication: For Supplement    Vitamin C 500 mg oral capsule  -- 1 cap(s) by mouth 2 times a day with ferrous sulfate  -- Indication: For Supplement, JESSICA.     folic acid 1 mg oral tablet  -- 1 tab(s) by mouth once a day  -- Indication: For Supplement

## 2018-04-02 NOTE — PROGRESS NOTE ADULT - SUBJECTIVE AND OBJECTIVE BOX
Eating a large salad for lunch    Vital Signs Last 24 Hrs  T(C): 36.5 (02 Apr 2018 05:52), Max: 36.8 (01 Apr 2018 13:26)  T(F): 97.7 (02 Apr 2018 05:52), Max: 98.3 (01 Apr 2018 21:34)  HR: 85 (02 Apr 2018 05:52) (73 - 85)  BP: 134/73 (02 Apr 2018 05:52) (115/73 - 134/73)  BP(mean): --  RR: 17 (02 Apr 2018 05:52) (16 - 20)  SpO2: 92% (02 Apr 2018 05:52) (91% - 97%)    GENERAL: NAD, AAOx3, obese  HEAD:  Atraumatic, Normocephalic  EYES: EOMI  NECK: Supple, No JVD  CHEST/LUNG: CTABL, No wheeze  HEART: Regular rate and rhythm; no murmur  ABDOMEN: Soft, NTND, NABS  EXTREMITIES:  rt LE incision dressed with medihoney and xeroform  SKIN: No rashes or lesions  NEURO: No focal deficits    LABS:                        8.9    6.0   )-----------( 281      ( 02 Apr 2018 06:38 )             26.7           PT/INR - ( 02 Apr 2018 06:38 )   PT: 39.2 sec;   INR: 3.54 ratio           CAPILLARY BLOOD GLUCOSE

## 2018-04-02 NOTE — DISCHARGE NOTE ADULT - CARE PROVIDER_API CALL
Aki Anderson), Internal Medicine  [address] [facility]  [address]  [place] [address]  Phone: [phone]  Fax: [phone]    Everett Kumar), Internal Medicine; Pulmonary Disease  [address] [facility] [facility]  [address]  [place] [address]  Phone: [phone]  Fax: [phone]    Nate Bass), Orthopedics  [address] [place] [address]  Phone: [phone]  Fax: [phone]    Brian Mg), Surgery  PlasticReconstruct  [address]  [address]  [place] [address]  Phone: [phone]  Fax: [phone]

## 2018-04-02 NOTE — PROGRESS NOTE ADULT - SUBJECTIVE AND OBJECTIVE BOX
CC: f/u for right knee infection    Patient reports: no complaints, anxious to discharge    REVIEW OF SYSTEMS:  All other review of systems negative (Comprehensive ROS)    Antimicrobials Day #  :day 9 dapto  DAPTOmycin IVPB 900 milliGRAM(s) IV Intermittent every 24 hours    Other Medications Reviewed    T(F): 98.5 (04-02-18 @ 13:41), Max: 98.5 (04-02-18 @ 13:41)  HR: 83 (04-02-18 @ 13:41)  BP: 126/73 (04-02-18 @ 13:41)  RR: 20 (04-02-18 @ 13:41)  SpO2: 93% (04-02-18 @ 13:41)  Wt(kg): --    PHYSICAL EXAM:  General: alert, no acute distress  Eyes:  anicteric, no conjunctival injection, no discharge  Oropharynx: no lesions or injection 	  Neck: supple, without adenopathy  Lungs: clear to auscultation  Heart: regular rate and rhythm; no murmur, rubs or gallops  Abdomen: soft, nondistended, nontender, without mass or organomegaly  Skin: no lesions  Extremities: no clubbing, cyanosis, or edema  Neurologic: alert, oriented, moves all extremities  Rt leg with immobilizer  LAB RESULTS:                        8.9    6.0   )-----------( 281      ( 02 Apr 2018 06:38 )             26.7               MICROBIOLOGY:  RECENT CULTURES:  knee with MRSA    RADIOLOGY REVIEWED:

## 2018-04-02 NOTE — DISCHARGE NOTE ADULT - MEDICATION SUMMARY - MEDICATIONS TO CHANGE
I will SWITCH the dose or number of times a day I take the medications listed below when I get home from the hospital:    Vitamin C 500 mg oral capsule  -- 1 cap(s) by mouth once a day    ferrous sulfate 324 mg (65 mg elemental iron) oral tablet  -- 1 tab(s) by mouth once a day    warfarin 1 mg oral tablet  -- 1 tab(s) by mouth once a day, in PM

## 2018-04-02 NOTE — DISCHARGE NOTE ADULT - ADDITIONAL INSTRUCTIONS
Please make an appointment to follow up with Dr. Mg within 1-2 weeks of discharge from the hospital. You may call [phone] to make this appointment. Please make an appointment to follow up with Dr. Mg within 1-2 weeks of discharge from the hospital. You may call [phone] to make this appointment.  Follow up with orthopedics Dr. Bass.  Follow up with Primary provider upon discharge from [facility]. Please make an appointment to follow up with Dr. Mg within 1-2 weeks of discharge from the hospital. You may call [phone] to make this appointment.  Follow up with orthopedics Dr. Bass.  follow up with pulmonary Dr. Kumar as directed  Follow up with Primary provider upon discharge from [facility].

## 2018-04-02 NOTE — DISCHARGE NOTE ADULT - SECONDARY DIAGNOSIS.
Acute kidney injury superimposed on chronic kidney disease Anemia due to blood loss Mild intermittent asthma without complication DVT (deep venous thrombosis)

## 2018-04-02 NOTE — PROGRESS NOTE ADULT - SUBJECTIVE AND OBJECTIVE BOX
Patient seen and examined. Pain controlled. 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 975 milliGRAM(s) Oral every 8 hours  ascorbic acid 500 milliGRAM(s) Oral two times a day  buDESOnide 160 MICROgram(s)/formoterol 4.5 MICROgram(s) Inhaler 2 Puff(s) Inhalation two times a day  calcium carbonate 1250 mG + Vitamin D (OsCal 500 + D) 1 Tablet(s) Oral three times a day  cyclobenzaprine 5 milliGRAM(s) Oral two times a day  DAPTOmycin IVPB 900 milliGRAM(s) IV Intermittent every 24 hours  docusate sodium 100 milliGRAM(s) Oral three times a day  ferrous    sulfate 325 milliGRAM(s) Oral three times a day with meals  folic acid 1 milliGRAM(s) Oral daily  metoprolol tartrate 12.5 milliGRAM(s) Oral two times a day  multivitamin 1 Tablet(s) Oral daily  pantoprazole    Tablet 40 milliGRAM(s) Oral daily  polyethylene glycol 3350 17 Gram(s) Oral daily  simvastatin 20 milliGRAM(s) Oral at bedtime  sodium chloride 0.9% lock flush 3 milliLiter(s) IV Push every 8 hours    Allergies    amoxicillin (Other)    Intolerances    IV Contrast (Flushing (Skin); Nausea)                          8.5    4.87  )-----------( 242      ( 31 Mar 2018 09:38 )             26.5     31 Mar 2018 07:19    141    |  104    |  9      ----------------------------<  105    3.9     |  26     |  0.68     Ca    9.3        31 Mar 2018 07:19      PT/INR - ( 01 Apr 2018 08:08 )   PT: 28.4 sec;   INR: 2.47 ratio           Vital Signs Last 24 Hrs  T(C): 36.5 (04-02-18 @ 05:52), Max: 36.8 (04-01-18 @ 13:26)  T(F): 97.7 (04-02-18 @ 05:52), Max: 98.3 (04-01-18 @ 21:34)  HR: 85 (04-02-18 @ 05:52) (73 - 85)  BP: 134/73 (04-02-18 @ 05:52) (115/73 - 134/73)  BP(mean): --  RR: 17 (04-02-18 @ 05:52) (16 - 20)  SpO2: 92% (04-02-18 @ 05:52) (91% - 97%)    Physical Exam  Gen: NAD  RLE:  Dressing c/d/i  +ehl/fhl/ta/gs function  L2-S1 silt  Dp/pt pulse intact  No calf ttp  Compartments soft    A/P: 77y Female sp R revision TKA w/plastics closure  Pain control  DVT ppx  PT  50% PWB in navarro  FU labs  abx as per ID  Ice/elevate  Medical management appreciated  Incentive spirometry  Dispo planning

## 2018-04-02 NOTE — DISCHARGE NOTE ADULT - REASON FOR ADMISSION
right knee infection right knee infection - MRSA infection - needs Daptomycin thru 5/5 with suppressive ABX therapy with Minocycline/Keflex

## 2018-04-03 LAB
ANION GAP SERPL CALC-SCNC: 9 MMOL/L — SIGNIFICANT CHANGE UP (ref 5–17)
APTT BLD: 52.1 SEC — HIGH (ref 27.5–37.4)
BUN SERPL-MCNC: 12 MG/DL — SIGNIFICANT CHANGE UP (ref 7–23)
CALCIUM SERPL-MCNC: 9 MG/DL — SIGNIFICANT CHANGE UP (ref 8.4–10.5)
CHLORIDE SERPL-SCNC: 102 MMOL/L — SIGNIFICANT CHANGE UP (ref 96–108)
CO2 SERPL-SCNC: 26 MMOL/L — SIGNIFICANT CHANGE UP (ref 22–31)
CREAT SERPL-MCNC: 0.74 MG/DL — SIGNIFICANT CHANGE UP (ref 0.5–1.3)
GLUCOSE SERPL-MCNC: 106 MG/DL — HIGH (ref 70–99)
HCT VFR BLD CALC: 27.1 % — LOW (ref 34.5–45)
HGB BLD-MCNC: 8.3 G/DL — LOW (ref 11.5–15.5)
INR BLD: 3.8 RATIO — HIGH (ref 0.88–1.16)
MCHC RBC-ENTMCNC: 28.3 PG — SIGNIFICANT CHANGE UP (ref 27–34)
MCHC RBC-ENTMCNC: 30.6 GM/DL — LOW (ref 32–36)
MCV RBC AUTO: 92.5 FL — SIGNIFICANT CHANGE UP (ref 80–100)
NRBC # BLD: 0 /100 WBCS — SIGNIFICANT CHANGE UP (ref 0–0)
PLATELET # BLD AUTO: 302 K/UL — SIGNIFICANT CHANGE UP (ref 150–400)
POTASSIUM SERPL-MCNC: 4 MMOL/L — SIGNIFICANT CHANGE UP (ref 3.5–5.3)
POTASSIUM SERPL-SCNC: 4 MMOL/L — SIGNIFICANT CHANGE UP (ref 3.5–5.3)
PROTHROM AB SERPL-ACNC: 44.1 SEC — HIGH (ref 10–13.1)
RBC # BLD: 2.93 M/UL — LOW (ref 3.8–5.2)
RBC # FLD: 17.2 % — HIGH (ref 10.3–14.5)
SODIUM SERPL-SCNC: 137 MMOL/L — SIGNIFICANT CHANGE UP (ref 135–145)
WBC # BLD: 6.56 K/UL — SIGNIFICANT CHANGE UP (ref 3.8–10.5)
WBC # FLD AUTO: 6.56 K/UL — SIGNIFICANT CHANGE UP (ref 3.8–10.5)

## 2018-04-03 PROCEDURE — 99232 SBSQ HOSP IP/OBS MODERATE 35: CPT

## 2018-04-03 RX ORDER — ASPIRIN/CALCIUM CARB/MAGNESIUM 324 MG
81 TABLET ORAL DAILY
Qty: 0 | Refills: 0 | Status: DISCONTINUED | OUTPATIENT
Start: 2018-04-03 | End: 2018-04-04

## 2018-04-03 RX ADMIN — SODIUM CHLORIDE 3 MILLILITER(S): 9 INJECTION INTRAMUSCULAR; INTRAVENOUS; SUBCUTANEOUS at 06:24

## 2018-04-03 RX ADMIN — OXYCODONE HYDROCHLORIDE 5 MILLIGRAM(S): 5 TABLET ORAL at 09:37

## 2018-04-03 RX ADMIN — Medication 100 MILLIGRAM(S): at 06:24

## 2018-04-03 RX ADMIN — SODIUM CHLORIDE 3 MILLILITER(S): 9 INJECTION INTRAMUSCULAR; INTRAVENOUS; SUBCUTANEOUS at 21:24

## 2018-04-03 RX ADMIN — Medication 12.5 MILLIGRAM(S): at 17:33

## 2018-04-03 RX ADMIN — OXYCODONE HYDROCHLORIDE 5 MILLIGRAM(S): 5 TABLET ORAL at 13:15

## 2018-04-03 RX ADMIN — Medication 325 MILLIGRAM(S): at 13:11

## 2018-04-03 RX ADMIN — OXYCODONE HYDROCHLORIDE 5 MILLIGRAM(S): 5 TABLET ORAL at 23:03

## 2018-04-03 RX ADMIN — Medication 500 MILLIGRAM(S): at 06:24

## 2018-04-03 RX ADMIN — CYCLOBENZAPRINE HYDROCHLORIDE 5 MILLIGRAM(S): 10 TABLET, FILM COATED ORAL at 06:24

## 2018-04-03 RX ADMIN — Medication 100 MILLIGRAM(S): at 13:11

## 2018-04-03 RX ADMIN — Medication 12.5 MILLIGRAM(S): at 06:24

## 2018-04-03 RX ADMIN — Medication 325 MILLIGRAM(S): at 07:47

## 2018-04-03 RX ADMIN — Medication 975 MILLIGRAM(S): at 13:40

## 2018-04-03 RX ADMIN — CYCLOBENZAPRINE HYDROCHLORIDE 5 MILLIGRAM(S): 10 TABLET, FILM COATED ORAL at 17:32

## 2018-04-03 RX ADMIN — Medication 975 MILLIGRAM(S): at 21:17

## 2018-04-03 RX ADMIN — BUDESONIDE AND FORMOTEROL FUMARATE DIHYDRATE 2 PUFF(S): 160; 4.5 AEROSOL RESPIRATORY (INHALATION) at 17:33

## 2018-04-03 RX ADMIN — OXYCODONE HYDROCHLORIDE 5 MILLIGRAM(S): 5 TABLET ORAL at 05:15

## 2018-04-03 RX ADMIN — SODIUM CHLORIDE 3 MILLILITER(S): 9 INJECTION INTRAMUSCULAR; INTRAVENOUS; SUBCUTANEOUS at 13:18

## 2018-04-03 RX ADMIN — Medication 100 MILLIGRAM(S): at 21:17

## 2018-04-03 RX ADMIN — OXYCODONE HYDROCHLORIDE 5 MILLIGRAM(S): 5 TABLET ORAL at 23:45

## 2018-04-03 RX ADMIN — POLYETHYLENE GLYCOL 3350 17 GRAM(S): 17 POWDER, FOR SOLUTION ORAL at 13:11

## 2018-04-03 RX ADMIN — Medication 81 MILLIGRAM(S): at 13:15

## 2018-04-03 RX ADMIN — Medication 500 MILLIGRAM(S): at 17:33

## 2018-04-03 RX ADMIN — Medication 1 TABLET(S): at 13:11

## 2018-04-03 RX ADMIN — OXYCODONE HYDROCHLORIDE 5 MILLIGRAM(S): 5 TABLET ORAL at 09:07

## 2018-04-03 RX ADMIN — Medication 975 MILLIGRAM(S): at 21:35

## 2018-04-03 RX ADMIN — Medication 1 TABLET(S): at 06:24

## 2018-04-03 RX ADMIN — OXYCODONE HYDROCHLORIDE 5 MILLIGRAM(S): 5 TABLET ORAL at 13:40

## 2018-04-03 RX ADMIN — Medication 1 MILLIGRAM(S): at 13:11

## 2018-04-03 RX ADMIN — SIMVASTATIN 20 MILLIGRAM(S): 20 TABLET, FILM COATED ORAL at 21:17

## 2018-04-03 RX ADMIN — Medication 325 MILLIGRAM(S): at 17:35

## 2018-04-03 RX ADMIN — Medication 975 MILLIGRAM(S): at 13:11

## 2018-04-03 RX ADMIN — Medication 1 TABLET(S): at 21:17

## 2018-04-03 RX ADMIN — DAPTOMYCIN 136 MILLIGRAM(S): 500 INJECTION, POWDER, LYOPHILIZED, FOR SOLUTION INTRAVENOUS at 13:14

## 2018-04-03 RX ADMIN — PANTOPRAZOLE SODIUM 40 MILLIGRAM(S): 20 TABLET, DELAYED RELEASE ORAL at 13:11

## 2018-04-03 RX ADMIN — BUDESONIDE AND FORMOTEROL FUMARATE DIHYDRATE 2 PUFF(S): 160; 4.5 AEROSOL RESPIRATORY (INHALATION) at 06:28

## 2018-04-03 RX ADMIN — OXYCODONE HYDROCHLORIDE 5 MILLIGRAM(S): 5 TABLET ORAL at 04:46

## 2018-04-03 NOTE — PROGRESS NOTE ADULT - ASSESSMENT
A/P 77F s/p R total knee insertion of spacer, irrigation and debridement, complex wound closure (POD 11)  - Appreciate ID note, long term abx with dapto followed by PO minocycline and keflex  - Pain control  - DVT ppx; cont  - Care/dispo per primary  - OOB  - Pulmonary toilet  - Xeroform/occlusive gauze to area of epidermolysis daily    Vandana Mesa PGY2   P [phone]

## 2018-04-03 NOTE — PROGRESS NOTE ADULT - ASSESSMENT
Failed prosthetic joint infection  S/P fusion and removal of spacer  Recurrent infection, strep and know MRSA  History of AS,CAD,PCI and TAVR  SSS , s/p PPM  Continue daptomycin   Weekly labs including cbc,diff, cmp and CPK  Eventual stepdown to oral MCN and cephalexin.  Stable from ID viewpoint for discharge  Anticoagulation per medicine and cardiology  No signs of drug toxicity

## 2018-04-03 NOTE — PROGRESS NOTE ADULT - SUBJECTIVE AND OBJECTIVE BOX
Patient seen and examined. Pain controlled. No acute events overnight. Denies any new fever/chills/SOB/CP. Patient states she feels better and is ready to go to rehab.     HEALTH ISSUES - PROBLEM Dx:  CAD (coronary artery disease): CAD (coronary artery disease)  Staphylococcal arthritis of right knee: Staphylococcal arthritis of right knee  Anemia due to blood loss: Anemia due to blood loss  Acute kidney injury superimposed on chronic kidney disease: Acute kidney injury superimposed on chronic kidney disease  DVT (deep venous thrombosis): DVT (deep venous thrombosis)  Total knee replacement status, right: Total knee replacement status, right  Pacemaker: Pacemaker  Asthma: Asthma        MEDICATIONS  (STANDING):  acetaminophen   Tablet. 975 milliGRAM(s) Oral every 8 hours  ascorbic acid 500 milliGRAM(s) Oral two times a day  buDESOnide 160 MICROgram(s)/formoterol 4.5 MICROgram(s) Inhaler 2 Puff(s) Inhalation two times a day  calcium carbonate 1250 mG + Vitamin D (OsCal 500 + D) 1 Tablet(s) Oral three times a day  cyclobenzaprine 5 milliGRAM(s) Oral two times a day  DAPTOmycin IVPB 900 milliGRAM(s) IV Intermittent every 24 hours  docusate sodium 100 milliGRAM(s) Oral three times a day  ferrous    sulfate 325 milliGRAM(s) Oral three times a day with meals  folic acid 1 milliGRAM(s) Oral daily  metoprolol tartrate 12.5 milliGRAM(s) Oral two times a day  multivitamin 1 Tablet(s) Oral daily  pantoprazole    Tablet 40 milliGRAM(s) Oral daily  polyethylene glycol 3350 17 Gram(s) Oral daily  simvastatin 20 milliGRAM(s) Oral at bedtime  sodium chloride 0.9% lock flush 3 milliLiter(s) IV Push every 8 hours    Allergies    amoxicillin (Other)    Intolerances    IV Contrast (Flushing (Skin); Nausea)                                8.9    6.0   )-----------( 281      ( 02 Apr 2018 06:38 )             26.7   04-03    137  |  102  |  12  ----------------------------<  106<H>  4.0   |  26  |  0.74    Ca    9.0      03 Apr 2018 05:59      Vital Signs Last 24 Hrs  T(C): 36.9 (03 Apr 2018 04:44), Max: 36.9 (02 Apr 2018 13:41)  T(F): 98.4 (03 Apr 2018 04:44), Max: 98.5 (02 Apr 2018 13:41)  HR: 86 (03 Apr 2018 04:44) (74 - 86)  BP: 120/73 (03 Apr 2018 04:44) (120/73 - 143/78)  RR: 18 (03 Apr 2018 04:44) (18 - 20)  SpO2: 93% (03 Apr 2018 04:44) (93% - 97%)    Physical Exam  Gen: NAD  RLE:  Dressing c/d/i, PRSx removed and changed earlier this AM  +ehl/fhl/ta/gs function  L2-S1 silt  Dp/pt pulse intact  No calf ttp  Compartments soft    A/P: 77y Female sp R revision TKA w/plastics closure POD 11  Pain control  DVT ppx  PT  50% PWB in navarro  FU labs  abx and PICC management as per ID  Ice/elevate  Continue care per primary team   Wound management per plastics  Incentive spirometry  Please FU with Dr Bass in the office as directed, Ortho stable for DC to Rehab

## 2018-04-03 NOTE — PROGRESS NOTE ADULT - SUBJECTIVE AND OBJECTIVE BOX
CC: f/u for right prosthetic knee infection.    Patient reports fear of not being able to walk again.S/P dressing change earlier by plastic surgery.    REVIEW OF SYSTEMS:  All other review of systems negative (Comprehensive ROS)    Antimicrobials Day #  :day 10/42  DAPTOmycin IVPB 900 milliGRAM(s) IV Intermittent every 24 hours    Other Medications Reviewed    T(F): 98.7 (04-03-18 @ 12:32), Max: 98.7 (04-03-18 @ 12:32)  HR: 81 (04-03-18 @ 12:32)  BP: 126/75 (04-03-18 @ 12:32)  RR: 19 (04-03-18 @ 12:32)  SpO2: 96% (04-03-18 @ 12:32)  Wt(kg): --    PHYSICAL EXAM:  General: alert, no acute distress  Eyes:  anicteric, no conjunctival injection, no discharge  Oropharynx: no lesions or injection 	  Neck: supple, without adenopathy  Lungs: clear to auscultation  Heart: regular rate and rhythm; no murmur  Abdomen: soft, nondistended, nontender, without mass or organomegaly  Skin: no lesions  Extremities: no clubbing, cyanosis, + edema  Neurologic: alert, oriented, moves all extremities  Rt knee dressed,immobilized  LAB RESULTS:                        8.3    6.56  )-----------( 302      ( 03 Apr 2018 07:22 )             27.1     04-03    137  |  102  |  12  ----------------------------<  106<H>  4.0   |  26  |  0.74    Ca    9.0      03 Apr 2018 05:59          MICROBIOLOGY:  RECENT CULTURES:      RADIOLOGY REVIEWED:

## 2018-04-03 NOTE — PROGRESS NOTE ADULT - SUBJECTIVE AND OBJECTIVE BOX
no new symptoms    Vital Signs Last 24 Hrs  T(C): 36.9 (03 Apr 2018 04:44), Max: 36.9 (02 Apr 2018 13:41)  T(F): 98.4 (03 Apr 2018 04:44), Max: 98.5 (02 Apr 2018 13:41)  HR: 86 (03 Apr 2018 04:44) (74 - 86)  BP: 120/73 (03 Apr 2018 04:44) (120/73 - 143/78)  BP(mean): --  RR: 18 (03 Apr 2018 04:44) (18 - 20)  SpO2: 93% (03 Apr 2018 04:44) (93% - 97%)    GENERAL: NAD, AAOx3, obese  HEAD:  Atraumatic, Normocephalic  EYES: EOMI  NECK: Supple, No JVD  CHEST/LUNG: CTABL, No wheeze  HEART: Regular rate and rhythm; no murmur  ABDOMEN: Soft, NTND, NABS  EXTREMITIES:  rt LE incision dressed with medihoney and xeroform  SKIN: No rashes or lesions  NEURO: No focal deficits    LABS:                        8.3    6.56  )-----------( 302      ( 03 Apr 2018 07:22 )             27.1     04-03    137  |  102  |  12  ----------------------------<  106<H>  4.0   |  26  |  0.74    Ca    9.0      03 Apr 2018 05:59      PT/INR - ( 03 Apr 2018 07:22 )   PT: 44.1 sec;   INR: 3.80 ratio         PTT - ( 03 Apr 2018 07:22 )  PTT:52.1 sec  CAPILLARY BLOOD GLUCOSE

## 2018-04-03 NOTE — PROGRESS NOTE ADULT - ASSESSMENT
Clinically stable from the cardiac perspective.  No cardiac complaints.  Stable volume status.  Tolerating low dose beta blocker (metoprolol 12.5 BID)  Please resume daily ASA 81 mg for CAD  INR supratherapeutic yesterday --> await today's INR  On IV abx (daptomycin) as per primary team. s/p PICC, concurrent abx can affect INR  D/C planning -- per Ortho, pt is ready for discharge to rehab.  Patient can f/u with me in my office 2 weeks after discharge from rehab.    Roverto Roblero  Pager [phone]

## 2018-04-03 NOTE — PROGRESS NOTE ADULT - SUBJECTIVE AND OBJECTIVE BOX
Cardiology/Vascular Medicine Inpatient Progress Note    Per Ortho, stable for d/c to rehab  Receiving IV abx via PICC as per ID  Patient can f/u with me in office 2 weeks after d/c after rehab  No current cardiac complaints  Please resume daily ASA 81 mg for CAD   INR supratherapeutic yesterday, await today's INR    Vital Signs Last 24 Hrs  T(C): 36.9 (03 Apr 2018 04:44), Max: 36.9 (02 Apr 2018 13:41)  T(F): 98.4 (03 Apr 2018 04:44), Max: 98.5 (02 Apr 2018 13:41)  HR: 86 (03 Apr 2018 04:44) (74 - 86)  BP: 120/73 (03 Apr 2018 04:44) (120/73 - 143/78)  BP(mean): --  RR: 18 (03 Apr 2018 04:44) (18 - 20)  SpO2: 93% (03 Apr 2018 04:44) (93% - 97%)    Appearance: NAD, chronically ill appearing, obese  HEENT:   Normal oral mucosa, PERRL, EOMI	  Lymphatic: No lymphadenopathy  Cardiovascular: Normal S1 S2, No JVD, + LE edema  Respiratory: Decreased BS b/l bases	  Psychiatry: Awake, alert  Gastrointestinal:  Soft, Non-tender, + BS	  Skin: No rashes, No ecchymoses, No cyanosis	  Neurologic: Non-focal  Extremities: +Rt knee brace, drainage in place    MEDICATIONS  (STANDING):  acetaminophen   Tablet. 975 milliGRAM(s) Oral every 8 hours  ascorbic acid 500 milliGRAM(s) Oral two times a day  buDESOnide 160 MICROgram(s)/formoterol 4.5 MICROgram(s) Inhaler 2 Puff(s) Inhalation two times a day  calcium carbonate 1250 mG + Vitamin D (OsCal 500 + D) 1 Tablet(s) Oral three times a day  cyclobenzaprine 5 milliGRAM(s) Oral two times a day  DAPTOmycin IVPB 900 milliGRAM(s) IV Intermittent every 24 hours  docusate sodium 100 milliGRAM(s) Oral three times a day  ferrous    sulfate 325 milliGRAM(s) Oral three times a day with meals  folic acid 1 milliGRAM(s) Oral daily  metoprolol tartrate 12.5 milliGRAM(s) Oral two times a day  multivitamin 1 Tablet(s) Oral daily  pantoprazole    Tablet 40 milliGRAM(s) Oral daily  polyethylene glycol 3350 17 Gram(s) Oral daily  simvastatin 20 milliGRAM(s) Oral at bedtime  sodium chloride 0.9% lock flush 3 milliLiter(s) IV Push every 8 hours    MEDICATIONS  (PRN):  acetaminophen   Tablet 650 milliGRAM(s) Oral every 6 hours PRN For Temp greater than 38 C (100.4 F)  aluminum hydroxide/magnesium hydroxide/simethicone Suspension 30 milliLiter(s) Oral four times a day PRN Indigestion  benzocaine 15 mG/menthol 3.6 mG Lozenge 1 Lozenge Oral every 4 hours PRN Sore Throat  bisacodyl Suppository 10 milliGRAM(s) Rectal daily PRN If no bowel movement by postoperative day #2  magnesium hydroxide Suspension 30 milliLiter(s) Oral daily PRN Constipation  ondansetron Injectable 4 milliGRAM(s) IV Push every 6 hours PRN Nausea and/or Vomiting  oxyCODONE    IR 5 milliGRAM(s) Oral every 4 hours PRN Moderate Pain (4 - 6)  senna 2 Tablet(s) Oral at bedtime PRN Constipation  sodium chloride 0.65% Nasal 1 Spray(s) Both Nostrils three times a day PRN Nasal Congestion      LABS:	 	                                               8.3    6.56  )-----------( 302      ( 03 Apr 2018 07:22 )             27.1   04-03    137  |  102  |  12  ----------------------------<  106<H>  4.0   |  26  |  0.74    Ca    9.0      03 Apr 2018 05:59    PT/INR - ( 02 Apr 2018 06:38 )   PT: 39.2 sec;   INR: 3.54 ratio          < from: Limited Transthoracic Echo (12.27.17 @ 14:06) >    Patient name: IRASEMA KOHLER  YOB: 1940   Age: 77 (F)   MR#: [id number]  Study Date: 12/27/2017  Location: [id number]: Lauren Finkelstein  Study quality: Limited  Referring Physician: Sammy Galvan MD / Garo Sorto MD / Roverto Roblero MD  Blood Pressure: 139/52 mmHg  Height: 157 cm  Weight: 131 kg  BSA: 2.2 m2  ------------------------------------------------------------------------  PROCEDURE: Limited transthoracic echocardiogram with 2-D.  M-Mode and spectral and color flow Doppler.  INDICATION: Endocarditis, valve unspecified (I38)  ------------------------------------------------------------------------  OBSERVATIONS:  Mitral Valve: Mitral annular calcification and calcified  mitral leaflets with decreased diastolic opening. Peak  mitral valve gradient equals 14 mm Hg, mean transmitral  valve gradient equals 6 mm Hg, consistent with moderate  mitral stenosis.  Aortic Root: Normal aortic root, aortic arch and descending  thoracic aorta.  Aortic Valve:Bioprosthetic aortic valve replacement. s/p  TAVR.  Left Atrium: Normal left atrium.  Left Ventricle: Normal left ventricular systolic function.  No segmental wall motion abnormalities. Normal left  ventricular internal dimensions and wall thicknesses.  Right Heart: Normal right atrium. Normal right ventricular  size and function. Normal tricuspid valve. Normal pulmonic  valve.  Pericardium/PleuraNormal pericardium with no pericardial  effusion.  ------------------------------------------------------------------------  CONCLUSIONS:  1. Mitral annular calcification and calcified mitral  leaflets with decreased diastolic opening. Peak mitral  valve gradient equals 14 mm Hg, mean transmitral valve  gradient equals 6 mm Hg, consistent with moderate mitral  stenosis.  2. Bioprosthetic aortic valve replacement. s/p TAVR.  3. Normal left ventricular systolic function. No segmental  wall motion abnormalities.  *** Compared with echocardiogram of 12/19/2017, no  significant changes noted. There isno obvious evidence of  endocarditis.  ------------------------------------------------------------------------  Confirmed on  12/27/2017 - 17:13:04 by Filipe Esparza M.D.  ------------------------------------------------------------------------    < end of copied text >      < from: Xray Knee 1 or 2 Views, Right (03.23.18 @ 18:26) >    EXAM:  KNEE; 1 OR 2 VIEWS - RIGHT                            PROCEDURE DATE:  03/23/2018            INTERPRETATION:  INDICATION: post-op. Pain    COMPARISON: Knee radiographs dated 1/25/2018    FINDING: Two views of the knee demonstrates interval placement of an   intramedullary ruthann bridging the distal femur and proximal tibia. There is   cement material within the knee joint space. The findings are compatible   with arthrodesis and cement spacer placement. There are surgical clips,   soft tissue swelling and draining keeping with recent surgery.    IMPRESSION:   Interval postoperative changes of the knee with cement spacer and fusion   ruthann in place.        AARTI DIEGO M.D., ATTENDING RADIOLOGIST  This document has been electronically signed. Mar 24 2018 12:15AM        < end of copied text >      < from: Xray Chest 1 View- PORTABLE-Routine (03.26.18 @ 13:06) >  EXAM:  XR CHEST PORTABLE ROUTINE 1V                            PROCEDURE DATE:  03/26/2018            INTERPRETATION:  CLINICAL INDICATION: Shortness of breath. Rales.    TECHNIQUE: Single AP view of the chest was obtained on 3/26/2018.    COMPARISON: 3/25/2018.    FINDINGS: Elevation of the right hemidiaphragm. Left lower lung linear   opacity compatible with atelectasis. The heart size is normal. The bones   are intact.  Left chest pacemaker. Status post TAVR.    IMPRESSION: Left lower lung subsegmental atelectasis.        BRADY BANERJEE M.D., RADIOLOGY RESIDENT  This document has been electronically signed.  FELIPE TEMPLETON M.D., ATTENDING RADIOLOGIST  This document has been electronically signed. Mar 26 2018  3:56PM

## 2018-04-03 NOTE — PROGRESS NOTE ADULT - SUBJECTIVE AND OBJECTIVE BOX
Plastic Surgery    SUBJECTIVE:   Complaining of pain this morning during dressing changes    VITALS  T(C): 36.9 (04-03-18 @ 04:44), Max: 36.9 (04-02-18 @ 13:41)  HR: 86 (04-03-18 @ 04:44) (74 - 86)  BP: 120/73 (04-03-18 @ 04:44) (120/73 - 143/78)  BP(mean): --  RR: 18 (04-03-18 @ 04:44) (18 - 20)  SpO2: 93% (04-03-18 @ 04:44) (93% - 97%)  Wt(kg): --  CAPILLARY BLOOD GLUCOSE          Is/Os    04-01 @ 07:01  -  04-02 @ 07:00  --------------------------------------------------------  IN:    Oral Fluid: 300 mL  Total IN: 300 mL    OUT:  Total OUT: 0 mL    Total NET: 300 mL      04-02 @ 07:01  -  04-03 @ 06:35  --------------------------------------------------------  IN:    Oral Fluid: 600 mL  Total IN: 600 mL    OUT:  Total OUT: 0 mL    Total NET: 600 mL          PHYSICAL EXAM:   General: NAD, Lying in bed comfortably  Extrem: RLE with stable epidermolysis, no palpable collections, stable ecchymoses.  Redressed with occlusive gauze, ABD, and ACE wrap    MEDICATIONS (STANDING): acetaminophen   Tablet. 975 milliGRAM(s) Oral every 8 hours  ascorbic acid 500 milliGRAM(s) Oral two times a day  buDESOnide 160 MICROgram(s)/formoterol 4.5 MICROgram(s) Inhaler 2 Puff(s) Inhalation two times a day  calcium carbonate 1250 mG + Vitamin D (OsCal 500 + D) 1 Tablet(s) Oral three times a day  cyclobenzaprine 5 milliGRAM(s) Oral two times a day  DAPTOmycin IVPB 900 milliGRAM(s) IV Intermittent every 24 hours  docusate sodium 100 milliGRAM(s) Oral three times a day  ferrous    sulfate 325 milliGRAM(s) Oral three times a day with meals  folic acid 1 milliGRAM(s) Oral daily  metoprolol tartrate 12.5 milliGRAM(s) Oral two times a day  multivitamin 1 Tablet(s) Oral daily  pantoprazole    Tablet 40 milliGRAM(s) Oral daily  polyethylene glycol 3350 17 Gram(s) Oral daily  simvastatin 20 milliGRAM(s) Oral at bedtime  sodium chloride 0.9% lock flush 3 milliLiter(s) IV Push every 8 hours    MEDICATIONS (PRN):acetaminophen   Tablet 650 milliGRAM(s) Oral every 6 hours PRN For Temp greater than 38 C (100.4 F)  aluminum hydroxide/magnesium hydroxide/simethicone Suspension 30 milliLiter(s) Oral four times a day PRN Indigestion  bisacodyl Suppository 10 milliGRAM(s) Rectal daily PRN If no bowel movement by postoperative day #2  magnesium hydroxide Suspension 30 milliLiter(s) Oral daily PRN Constipation  ondansetron Injectable 4 milliGRAM(s) IV Push every 6 hours PRN Nausea and/or Vomiting  oxyCODONE    IR 5 milliGRAM(s) Oral every 4 hours PRN Moderate Pain (4 - 6)  senna 2 Tablet(s) Oral at bedtime PRN Constipation      LABS  CBC (04-02 @ 06:38)                              8.9<L>                         6.0     )----------------(  281        --    % Neutrophils, --    % Lymphocytes, ANC: --                                  26.7<L>    BMP (04-03 @ 05:59)             137     |  102     |  12    		Ca++ --      Ca 9.0                ---------------------------------( 106<H>		Mg --                 4.0     |  26      |  0.74  			Ph --          Coags (04-02 @ 06:38)  aPTT -- / INR 3.54<H> / PT 39.2<H>

## 2018-04-04 VITALS
DIASTOLIC BLOOD PRESSURE: 76 MMHG | RESPIRATION RATE: 19 BRPM | TEMPERATURE: 99 F | OXYGEN SATURATION: 93 % | SYSTOLIC BLOOD PRESSURE: 128 MMHG | HEART RATE: 103 BPM

## 2018-04-04 LAB
APTT BLD: 50.1 SEC — HIGH (ref 27.5–37.4)
HCT VFR BLD CALC: 27.2 % — LOW (ref 34.5–45)
HGB BLD-MCNC: 9.1 G/DL — LOW (ref 11.5–15.5)
INR BLD: 2.99 RATIO — HIGH (ref 0.88–1.16)
MCHC RBC-ENTMCNC: 29.7 PG — SIGNIFICANT CHANGE UP (ref 27–34)
MCHC RBC-ENTMCNC: 33.7 GM/DL — SIGNIFICANT CHANGE UP (ref 32–36)
MCV RBC AUTO: 88.1 FL — SIGNIFICANT CHANGE UP (ref 80–100)
PLATELET # BLD AUTO: 328 K/UL — SIGNIFICANT CHANGE UP (ref 150–400)
PROTHROM AB SERPL-ACNC: 34.5 SEC — HIGH (ref 10–13.1)
RBC # BLD: 3.08 M/UL — LOW (ref 3.8–5.2)
RBC # FLD: 15.6 % — HIGH (ref 10.3–14.5)
WBC # BLD: 9.8 K/UL — SIGNIFICANT CHANGE UP (ref 3.8–10.5)
WBC # FLD AUTO: 9.8 K/UL — SIGNIFICANT CHANGE UP (ref 3.8–10.5)

## 2018-04-04 PROCEDURE — 80048 BASIC METABOLIC PNL TOTAL CA: CPT

## 2018-04-04 PROCEDURE — 97165 OT EVAL LOW COMPLEX 30 MIN: CPT

## 2018-04-04 PROCEDURE — 86902 BLOOD TYPE ANTIGEN DONOR EA: CPT

## 2018-04-04 PROCEDURE — 87070 CULTURE OTHR SPECIMN AEROBIC: CPT

## 2018-04-04 PROCEDURE — C1713: CPT

## 2018-04-04 PROCEDURE — 86880 COOMBS TEST DIRECT: CPT

## 2018-04-04 PROCEDURE — 80202 ASSAY OF VANCOMYCIN: CPT

## 2018-04-04 PROCEDURE — C1769: CPT

## 2018-04-04 PROCEDURE — 85730 THROMBOPLASTIN TIME PARTIAL: CPT

## 2018-04-04 PROCEDURE — 87186 SC STD MICRODIL/AGAR DIL: CPT

## 2018-04-04 PROCEDURE — 85610 PROTHROMBIN TIME: CPT

## 2018-04-04 PROCEDURE — 82436 ASSAY OF URINE CHLORIDE: CPT

## 2018-04-04 PROCEDURE — 71045 X-RAY EXAM CHEST 1 VIEW: CPT

## 2018-04-04 PROCEDURE — 84300 ASSAY OF URINE SODIUM: CPT

## 2018-04-04 PROCEDURE — 86850 RBC ANTIBODY SCREEN: CPT

## 2018-04-04 PROCEDURE — 36569 INSJ PICC 5 YR+ W/O IMAGING: CPT

## 2018-04-04 PROCEDURE — P9011: CPT

## 2018-04-04 PROCEDURE — 84133 ASSAY OF URINE POTASSIUM: CPT

## 2018-04-04 PROCEDURE — 86922 COMPATIBILITY TEST ANTIGLOB: CPT

## 2018-04-04 PROCEDURE — 87205 SMEAR GRAM STAIN: CPT

## 2018-04-04 PROCEDURE — 86900 BLOOD TYPING SEROLOGIC ABO: CPT

## 2018-04-04 PROCEDURE — P9040: CPT

## 2018-04-04 PROCEDURE — 82550 ASSAY OF CK (CPK): CPT

## 2018-04-04 PROCEDURE — 73560 X-RAY EXAM OF KNEE 1 OR 2: CPT

## 2018-04-04 PROCEDURE — 97530 THERAPEUTIC ACTIVITIES: CPT

## 2018-04-04 PROCEDURE — 97110 THERAPEUTIC EXERCISES: CPT

## 2018-04-04 PROCEDURE — 82570 ASSAY OF URINE CREATININE: CPT

## 2018-04-04 PROCEDURE — 97163 PT EVAL HIGH COMPLEX 45 MIN: CPT

## 2018-04-04 PROCEDURE — 86901 BLOOD TYPING SEROLOGIC RH(D): CPT

## 2018-04-04 PROCEDURE — 94640 AIRWAY INHALATION TREATMENT: CPT

## 2018-04-04 PROCEDURE — 36430 TRANSFUSION BLD/BLD COMPNT: CPT

## 2018-04-04 PROCEDURE — C1751: CPT

## 2018-04-04 PROCEDURE — 83935 ASSAY OF URINE OSMOLALITY: CPT

## 2018-04-04 PROCEDURE — 97535 SELF CARE MNGMENT TRAINING: CPT

## 2018-04-04 PROCEDURE — 87075 CULTR BACTERIA EXCEPT BLOOD: CPT

## 2018-04-04 PROCEDURE — 82962 GLUCOSE BLOOD TEST: CPT

## 2018-04-04 PROCEDURE — P9016: CPT

## 2018-04-04 PROCEDURE — 82272 OCCULT BLD FECES 1-3 TESTS: CPT

## 2018-04-04 PROCEDURE — 87086 URINE CULTURE/COLONY COUNT: CPT

## 2018-04-04 PROCEDURE — 86870 RBC ANTIBODY IDENTIFICATION: CPT

## 2018-04-04 PROCEDURE — 76000 FLUOROSCOPY <1 HR PHYS/QHP: CPT

## 2018-04-04 PROCEDURE — 87040 BLOOD CULTURE FOR BACTERIA: CPT

## 2018-04-04 PROCEDURE — 97112 NEUROMUSCULAR REEDUCATION: CPT

## 2018-04-04 PROCEDURE — 85027 COMPLETE CBC AUTOMATED: CPT

## 2018-04-04 RX ORDER — POLYETHYLENE GLYCOL 3350 17 G/17G
17 POWDER, FOR SOLUTION ORAL
Qty: 0 | Refills: 0 | COMMUNITY
Start: 2018-04-04

## 2018-04-04 RX ORDER — SIMVASTATIN 20 MG/1
1 TABLET, FILM COATED ORAL
Qty: 0 | Refills: 0 | COMMUNITY
Start: 2018-04-04

## 2018-04-04 RX ORDER — ACETAMINOPHEN 500 MG
3 TABLET ORAL
Qty: 0 | Refills: 0 | COMMUNITY
Start: 2018-04-04

## 2018-04-04 RX ORDER — DOCUSATE SODIUM 100 MG
1 CAPSULE ORAL
Qty: 0 | Refills: 0 | COMMUNITY
Start: 2018-04-04

## 2018-04-04 RX ORDER — FERROUS SULFATE 325(65) MG
1 TABLET ORAL
Qty: 0 | Refills: 0 | COMMUNITY

## 2018-04-04 RX ORDER — SENNA PLUS 8.6 MG/1
2 TABLET ORAL
Qty: 0 | Refills: 0 | COMMUNITY
Start: 2018-04-04

## 2018-04-04 RX ORDER — CYCLOBENZAPRINE HYDROCHLORIDE 10 MG/1
1 TABLET, FILM COATED ORAL
Qty: 0 | Refills: 0 | COMMUNITY
Start: 2018-04-04

## 2018-04-04 RX ORDER — METOPROLOL TARTRATE 50 MG
12.5 TABLET ORAL
Qty: 0 | Refills: 0 | COMMUNITY
Start: 2018-04-04

## 2018-04-04 RX ORDER — ALPRAZOLAM 0.25 MG
1 TABLET ORAL
Qty: 0 | Refills: 0 | COMMUNITY

## 2018-04-04 RX ORDER — ACETAMINOPHEN 500 MG
2 TABLET ORAL
Qty: 0 | Refills: 0 | COMMUNITY

## 2018-04-04 RX ORDER — ACETAMINOPHEN 500 MG
2 TABLET ORAL
Qty: 0 | Refills: 0 | COMMUNITY
Start: 2018-04-04

## 2018-04-04 RX ORDER — WARFARIN SODIUM 2.5 MG/1
1 TABLET ORAL
Qty: 0 | Refills: 0 | COMMUNITY

## 2018-04-04 RX ORDER — OXYCODONE HYDROCHLORIDE 5 MG/1
1 TABLET ORAL
Qty: 0 | Refills: 0 | COMMUNITY
Start: 2018-04-04

## 2018-04-04 RX ORDER — DAPTOMYCIN 500 MG/10ML
900 INJECTION, POWDER, LYOPHILIZED, FOR SOLUTION INTRAVENOUS
Qty: 0 | Refills: 0 | COMMUNITY
Start: 2018-04-04

## 2018-04-04 RX ORDER — FOLIC ACID 0.8 MG
1 TABLET ORAL
Qty: 0 | Refills: 0 | COMMUNITY
Start: 2018-04-04

## 2018-04-04 RX ORDER — ASPIRIN/CALCIUM CARB/MAGNESIUM 324 MG
1 TABLET ORAL
Qty: 0 | Refills: 0 | COMMUNITY
Start: 2018-04-04

## 2018-04-04 RX ORDER — ASCORBIC ACID 60 MG
1 TABLET,CHEWABLE ORAL
Qty: 0 | Refills: 0 | COMMUNITY

## 2018-04-04 RX ORDER — ESZOPICLONE 2 MG/1
1 TABLET, COATED ORAL
Qty: 0 | Refills: 0 | COMMUNITY

## 2018-04-04 RX ADMIN — BUDESONIDE AND FORMOTEROL FUMARATE DIHYDRATE 2 PUFF(S): 160; 4.5 AEROSOL RESPIRATORY (INHALATION) at 05:53

## 2018-04-04 RX ADMIN — Medication 325 MILLIGRAM(S): at 13:17

## 2018-04-04 RX ADMIN — Medication 1 TABLET(S): at 13:17

## 2018-04-04 RX ADMIN — Medication 81 MILLIGRAM(S): at 13:17

## 2018-04-04 RX ADMIN — Medication 100 MILLIGRAM(S): at 13:17

## 2018-04-04 RX ADMIN — CYCLOBENZAPRINE HYDROCHLORIDE 5 MILLIGRAM(S): 10 TABLET, FILM COATED ORAL at 05:53

## 2018-04-04 RX ADMIN — DAPTOMYCIN 136 MILLIGRAM(S): 500 INJECTION, POWDER, LYOPHILIZED, FOR SOLUTION INTRAVENOUS at 13:17

## 2018-04-04 RX ADMIN — POLYETHYLENE GLYCOL 3350 17 GRAM(S): 17 POWDER, FOR SOLUTION ORAL at 13:17

## 2018-04-04 RX ADMIN — Medication 12.5 MILLIGRAM(S): at 05:53

## 2018-04-04 RX ADMIN — Medication 1 MILLIGRAM(S): at 13:17

## 2018-04-04 RX ADMIN — SODIUM CHLORIDE 3 MILLILITER(S): 9 INJECTION INTRAMUSCULAR; INTRAVENOUS; SUBCUTANEOUS at 13:20

## 2018-04-04 RX ADMIN — OXYCODONE HYDROCHLORIDE 5 MILLIGRAM(S): 5 TABLET ORAL at 08:24

## 2018-04-04 RX ADMIN — Medication 500 MILLIGRAM(S): at 05:53

## 2018-04-04 RX ADMIN — Medication 975 MILLIGRAM(S): at 13:18

## 2018-04-04 RX ADMIN — Medication 1 TABLET(S): at 05:53

## 2018-04-04 RX ADMIN — Medication 325 MILLIGRAM(S): at 08:24

## 2018-04-04 RX ADMIN — PANTOPRAZOLE SODIUM 40 MILLIGRAM(S): 20 TABLET, DELAYED RELEASE ORAL at 13:17

## 2018-04-04 RX ADMIN — Medication 100 MILLIGRAM(S): at 05:53

## 2018-04-04 RX ADMIN — SODIUM CHLORIDE 3 MILLILITER(S): 9 INJECTION INTRAMUSCULAR; INTRAVENOUS; SUBCUTANEOUS at 05:50

## 2018-04-04 RX ADMIN — OXYCODONE HYDROCHLORIDE 5 MILLIGRAM(S): 5 TABLET ORAL at 13:18

## 2018-04-04 NOTE — PROGRESS NOTE ADULT - PROBLEM SELECTOR PLAN 4
metoprolol 12.5 mg bid  statin
metoprolol 12.5 mg bid  statin
s/p 4 units total of pRBCs so far  transfuse if Hgb < 7.5
metoprolol 12.5 mg bid  statin
s/p 4 units total of pRBCs so far  transfuse if Hgb < 7.5

## 2018-04-04 NOTE — PROGRESS NOTE ADULT - SUBJECTIVE AND OBJECTIVE BOX
Plastic Surgery    SUBJECTIVE:   Patient frustrated with pain involved with daily dressing changes as well as inability to ambulate.  Febrile overnight to 38.2 @ 2046.    VITALS  T(C): 36.7 (04-04-18 @ 05:12), Max: 38.2 (04-03-18 @ 20:46)  HR: 73 (04-04-18 @ 05:12) (73 - 99)  BP: 138/81 (04-04-18 @ 05:12) (126/75 - 138/81)  BP(mean): --  RR: 18 (04-04-18 @ 05:12) (18 - 20)  SpO2: 95% (04-04-18 @ 05:12) (95% - 96%)  Wt(kg): --  CAPILLARY BLOOD GLUCOSE          Is/Os    04-02 @ 07:01  -  04-03 @ 07:00  --------------------------------------------------------  IN:    Oral Fluid: 600 mL  Total IN: 600 mL    OUT:  Total OUT: 0 mL    Total NET: 600 mL      04-03 @ 07:01  -  04-04 @ 06:49  --------------------------------------------------------  IN:    Oral Fluid: 440 mL  Total IN: 440 mL    OUT:  Total OUT: 0 mL    Total NET: 440 mL          PHYSICAL EXAM:   General: NAD, Lying in bed comfortably  Extrem: RLE with stable eschar overlying anterior leg at patella. Suture line intact. Ecchymoses resolving. Edema of thigh, stable.    MEDICATIONS (STANDING): acetaminophen   Tablet. 975 milliGRAM(s) Oral every 8 hours  ascorbic acid 500 milliGRAM(s) Oral two times a day  aspirin enteric coated 81 milliGRAM(s) Oral daily  buDESOnide 160 MICROgram(s)/formoterol 4.5 MICROgram(s) Inhaler 2 Puff(s) Inhalation two times a day  calcium carbonate 1250 mG + Vitamin D (OsCal 500 + D) 1 Tablet(s) Oral three times a day  cyclobenzaprine 5 milliGRAM(s) Oral two times a day  DAPTOmycin IVPB 900 milliGRAM(s) IV Intermittent every 24 hours  docusate sodium 100 milliGRAM(s) Oral three times a day  ferrous    sulfate 325 milliGRAM(s) Oral three times a day with meals  folic acid 1 milliGRAM(s) Oral daily  metoprolol tartrate 12.5 milliGRAM(s) Oral two times a day  multivitamin 1 Tablet(s) Oral daily  pantoprazole    Tablet 40 milliGRAM(s) Oral daily  polyethylene glycol 3350 17 Gram(s) Oral daily  simvastatin 20 milliGRAM(s) Oral at bedtime  sodium chloride 0.9% lock flush 3 milliLiter(s) IV Push every 8 hours    MEDICATIONS (PRN):acetaminophen   Tablet 650 milliGRAM(s) Oral every 6 hours PRN For Temp greater than 38 C (100.4 F)  aluminum hydroxide/magnesium hydroxide/simethicone Suspension 30 milliLiter(s) Oral four times a day PRN Indigestion  bisacodyl Suppository 10 milliGRAM(s) Rectal daily PRN If no bowel movement by postoperative day #2  magnesium hydroxide Suspension 30 milliLiter(s) Oral daily PRN Constipation  ondansetron Injectable 4 milliGRAM(s) IV Push every 6 hours PRN Nausea and/or Vomiting  oxyCODONE    IR 5 milliGRAM(s) Oral every 4 hours PRN Moderate Pain (4 - 6)  senna 2 Tablet(s) Oral at bedtime PRN Constipation      LABS  CBC (04-03 @ 07:22)                              8.3<L>                         6.56    )----------------(  302        --    % Neutrophils, --    % Lymphocytes, ANC: --                                  27.1<L>    BMP (04-03 @ 05:59)             137     |  102     |  12    		Ca++ --      Ca 9.0                ---------------------------------( 106<H>		Mg --                 4.0     |  26      |  0.74  			Ph --          Coags (04-03 @ 07:22)  aPTT 52.1<H> / INR 3.80<H> / PT 44.1<H>

## 2018-04-04 NOTE — PROGRESS NOTE ADULT - PROBLEM SELECTOR PROBLEM 4
CAD (coronary artery disease)
Anemia due to blood loss
Anemia due to blood loss
CAD (coronary artery disease)

## 2018-04-04 NOTE — PROGRESS NOTE ADULT - ASSESSMENT
Failed prosthetic joint infection  S/P fusion and removal of spacer  Recurrent infection, strep and know MRSA  History of AS,CAD,PCI and TAVR  SSS , s/p PPM  Continue daptomycin   Weekly labs including cbc,diff, cmp and CPK  Eventual stepdown to oral MCN and possibly cephalexin  Stable from ID viewpoint for discharge although low grade fever will need to be followed  Anticoagulation per medicine and cardiology  No signs of drug toxicity.

## 2018-04-04 NOTE — PROGRESS NOTE ADULT - PROBLEM/PLAN-3
DISPLAY PLAN FREE TEXT
prior major surgery

## 2018-04-04 NOTE — PROGRESS NOTE ADULT - SUBJECTIVE AND OBJECTIVE BOX
CC: f/u for infected right knee spacer.    Patient reports no complaints, discharge planned    REVIEW OF SYSTEMS:  All other review of systems negative (Comprehensive ROS)    Antimicrobials Day #  :day 11/42  DAPTOmycin IVPB 900 milliGRAM(s) IV Intermittent every 24 hours    Other Medications Reviewed    T(F): 99.4 (04-04-18 @ 14:18), Max: 100.7 (04-03-18 @ 20:46)  HR: 103 (04-04-18 @ 14:18)  BP: 128/76 (04-04-18 @ 14:18)  RR: 19 (04-04-18 @ 14:18)  SpO2: 93% (04-04-18 @ 14:18)  Wt(kg): --    PHYSICAL EXAM:  General: alert, no acute distress  Eyes:  anicteric, no conjunctival injection, no discharge  Oropharynx: no lesions or injection 	  Neck: supple, without adenopathy  Lungs: clear to auscultation  Heart: regular rate and rhythm; no murmur, rubs or gallops  Abdomen: soft, nondistended, nontender, without mass or organomegaly  Skin: no lesions  Extremities: no clubbing, cyanosis, + edema  Neurologic: alert, oriented, moves all extremities  PICC exit clean, rt knee dressed  LAB RESULTS:                        9.1    9.8   )-----------( 328      ( 04 Apr 2018 11:24 )             27.2     04-03    137  |  102  |  12  ----------------------------<  106<H>  4.0   |  26  |  0.74    Ca    9.0      03 Apr 2018 05:59          MICROBIOLOGY:  RECENT CULTURES:      RADIOLOGY REVIEWED:

## 2018-04-04 NOTE — PROGRESS NOTE ADULT - PROBLEM SELECTOR PLAN 3
resolved  appreciate renal
resolved  appreciate renal recs
s/p 4 units total of pRBCs so far  transfuse if Hgb < 7.5

## 2018-04-04 NOTE — PROGRESS NOTE ADULT - SUBJECTIVE AND OBJECTIVE BOX
Patient seen and examined. Pain controlled. No acute events overnight. Denies any new fever/chills/SOB/CP. Patient states she feels better and is ready to go to rehab.     Vital Signs Last 24 Hrs  T(C): 36.7 (04 Apr 2018 05:12), Max: 38.2 (03 Apr 2018 20:46)  T(F): 98.1 (04 Apr 2018 05:12), Max: 100.7 (03 Apr 2018 20:46)  HR: 73 (04 Apr 2018 05:12) (73 - 99)  BP: 138/81 (04 Apr 2018 05:12) (126/75 - 138/81)  BP(mean): --  RR: 18 (04 Apr 2018 05:12) (18 - 20)  SpO2: 95% (04 Apr 2018 05:12) (95% - 96%)    Physical Exam  Gen: NAD  RLE:  Dressing c/d/i  +ehl/fhl/ta/gs function  L2-S1 silt  Dp/pt pulse intact  No calf ttp  Compartments soft    A/P: 77y Female sp R revision TKA w/plastics closure POD 12  Pain control  DVT ppx  PT  50% PWB in navarro, no knee ROM  FU labs  abx and PICC management as per ID  Ice/elevate  Continue care per primary team   Wound management per plastics  Incentive spirometry  Please FU with Dr Bass in the office as directed, Ortho stable for DC to Rehab

## 2018-04-04 NOTE — PROGRESS NOTE ADULT - PROBLEM SELECTOR PLAN 1
INR less thatn 3, restart at 2 mg
appreciate plastics/ortho/ID recs, cont dapto for 6-8 wks then suppressive therapy with keflex/minocycline, count wound care, s/p PICC 3/27  PASCUAL removed 3/30  medihoney over incision
appreciate plastics/ortho/ID recs, cont dapto for 6-8 wks then suppressive therapy with keflex/minocycline, count wound care, s/p PICC 3/27  PASCUAL removed 3/30  medihoney over incision -> xeroform dressing
appreciate plastics/ortho/ID recs, cont dapto for 6-8 wks then suppressive therapy with minocycline, count wound care, s/p PICC 3/27
cr improved  likely pre renal from blood loss  s/p prbcs  d/c ivf- encourage po intake  trend urine output  avoid nephrotoxins
cr improving  likely pre renal from blood loss  s/p prbcs  dec ivf to nacl @ 50cc/hr  trend urine output  avoid nephrotoxins
cr improved  likely pre renal from blood loss  s/p prbcs  off ivf- encourage po intake  consider d/c lund and TOV  trend urine output  avoid nephrotoxins
appreciate plastics/ortho/ID recs, cont dapto for 6-8 wks then suppressive therapy with minocycline, count wound care, s/p PICC 3/27  PASCUAL removed 3/30  medihoney over incision
appreciate plastics/ortho/ID recs, cont dapto for 6-8 wks then suppressive therapy with keflex/minocycline, count wound care, s/p PICC 3/27  PASCUAL removed 3/30  medihoney over incision
hold coumadin  if INR below 3.5 tomorrow, will restart at 2 mg

## 2018-04-04 NOTE — PROGRESS NOTE ADULT - PROBLEM SELECTOR PROBLEM 1
Acute kidney injury superimposed on chronic kidney disease
Acute kidney injury superimposed on chronic kidney disease
DVT (deep venous thrombosis)
Staphylococcal arthritis of right knee
Acute kidney injury superimposed on chronic kidney disease
Staphylococcal arthritis of right knee
Staphylococcal arthritis of right knee
DVT (deep venous thrombosis)

## 2018-04-04 NOTE — PROGRESS NOTE ADULT - PROVIDER SPECIALTY LIST ADULT
Cardiology
Infectious Disease
Internal Medicine
Nephrology
Nephrology
Orthopedics
Plastic Surgery
Orthopedics
Cardiology
Infectious Disease
Internal Medicine
Plastic Surgery
Nephrology
Internal Medicine
Internal Medicine

## 2018-04-04 NOTE — PROGRESS NOTE ADULT - PROBLEM SELECTOR PLAN 2
s/p prbcs  monitor hgb  transfuse per primary team  will monitor
appreciate plastics/ortho/ID recs, cont dapto for 6-8 wks then suppressive therapy with keflex/minocycline, count wound care, s/p PICC 3/27  PASCUAL removed 3/30  medihoney over incision -> xeroform dressing  low grade fever overnight, patient nontoxic, repeat CBC, if stable can proceed with discharge
lund out  monitor daily Cr  appreciate renal
resolved  appreciate renal
resolved  appreciate renal
s/p prbcs  monitor hgb  transfuse per primary team  will monitor
s/p prbcs  monitor hgb  transfuse per primary team  will monitor
resolved  appreciate renal
resolved  appreciate renal
appreciate plastics/ortho/ID recs, cont dapto for 6-8 wks then suppressive therapy with keflex/minocycline, count wound care, s/p PICC 3/27  PASCUAL removed 3/30  medihoney over incision -> xeroform dressing

## 2018-04-04 NOTE — PROGRESS NOTE ADULT - PROBLEM SELECTOR PROBLEM 3
Acute kidney injury superimposed on chronic kidney disease
Anemia due to blood loss
Acute kidney injury superimposed on chronic kidney disease

## 2018-04-04 NOTE — PROGRESS NOTE ADULT - PROBLEM SELECTOR PROBLEM 5
DVT (deep venous thrombosis)
DVT (deep venous thrombosis)
CAD (coronary artery disease)
CAD (coronary artery disease)
DVT (deep venous thrombosis)

## 2018-04-04 NOTE — PROGRESS NOTE ADULT - PROBLEM SELECTOR PLAN 6
duoliverio prn
duoilverio prn
duoliverio prn
duoliverio prn
palmira prn    dispo: repeat CBC, if stable can proceed with discharge to [facility]

## 2018-04-04 NOTE — PROGRESS NOTE ADULT - SUBJECTIVE AND OBJECTIVE BOX
Patient is a 77y old  Female who presents with a chief complaint of right knee infection (02 Apr 2018 09:57)      SUBJECTIVE / OVERNIGHT EVENTS:    Patient seen and examined. denies cp sob. low grade fever 100.7 overnight.      Vital Signs Last 24 Hrs  T(C): 36.7 (04 Apr 2018 05:12), Max: 38.2 (03 Apr 2018 20:46)  T(F): 98.1 (04 Apr 2018 05:12), Max: 100.7 (03 Apr 2018 20:46)  HR: 73 (04 Apr 2018 05:12) (73 - 99)  BP: 138/81 (04 Apr 2018 05:12) (126/75 - 138/81)  BP(mean): --  RR: 18 (04 Apr 2018 05:12) (18 - 20)  SpO2: 95% (04 Apr 2018 05:12) (95% - 96%)  I&O's Summary    03 Apr 2018 07:01  -  04 Apr 2018 07:00  --------------------------------------------------------  IN: 440 mL / OUT: 0 mL / NET: 440 mL    04 Apr 2018 07:01  -  04 Apr 2018 10:34  --------------------------------------------------------  IN: 280 mL / OUT: 0 mL / NET: 280 mL        PE:  GENERAL: NAD, AAOx3, obese  HEAD:  Atraumatic, Normocephalic  EYES:  conjunctiva and sclera clear  NECK: Supple, No JVD  CHEST/LUNG: CTABL, No wheeze  HEART: Regular rate and rhythm; no murmur  ABDOMEN: Soft, Nontender, Nondistended; Bowel sounds present  EXTREMITIES:  LE edema, eschar RLE  SKIN: No rashes or lesions  NEURO: No focal deficits    LABS:                        8.3    6.56  )-----------( 302      ( 03 Apr 2018 07:22 )             27.1     04-03    137  |  102  |  12  ----------------------------<  106<H>  4.0   |  26  |  0.74    Ca    9.0      03 Apr 2018 05:59      PT/INR - ( 04 Apr 2018 07:45 )   PT: 34.5 sec;   INR: 2.99 ratio         PTT - ( 04 Apr 2018 07:45 )  PTT:50.1 sec  CAPILLARY BLOOD GLUCOSE                RADIOLOGY & ADDITIONAL TESTS:    Imaging Personally Reviewed:  [x] YES  [ ] NO    Consultant(s) Notes Reviewed:  [x] YES  [ ] NO    MEDICATIONS  (STANDING):  acetaminophen   Tablet. 975 milliGRAM(s) Oral every 8 hours  ascorbic acid 500 milliGRAM(s) Oral two times a day  aspirin enteric coated 81 milliGRAM(s) Oral daily  buDESOnide 160 MICROgram(s)/formoterol 4.5 MICROgram(s) Inhaler 2 Puff(s) Inhalation two times a day  calcium carbonate 1250 mG + Vitamin D (OsCal 500 + D) 1 Tablet(s) Oral three times a day  cyclobenzaprine 5 milliGRAM(s) Oral two times a day  DAPTOmycin IVPB 900 milliGRAM(s) IV Intermittent every 24 hours  docusate sodium 100 milliGRAM(s) Oral three times a day  ferrous    sulfate 325 milliGRAM(s) Oral three times a day with meals  folic acid 1 milliGRAM(s) Oral daily  metoprolol tartrate 12.5 milliGRAM(s) Oral two times a day  multivitamin 1 Tablet(s) Oral daily  pantoprazole    Tablet 40 milliGRAM(s) Oral daily  polyethylene glycol 3350 17 Gram(s) Oral daily  simvastatin 20 milliGRAM(s) Oral at bedtime  sodium chloride 0.9% lock flush 3 milliLiter(s) IV Push every 8 hours    MEDICATIONS  (PRN):  acetaminophen   Tablet 650 milliGRAM(s) Oral every 6 hours PRN For Temp greater than 38 C (100.4 F)  aluminum hydroxide/magnesium hydroxide/simethicone Suspension 30 milliLiter(s) Oral four times a day PRN Indigestion  benzocaine 15 mG/menthol 3.6 mG Lozenge 1 Lozenge Oral every 4 hours PRN Sore Throat  bisacodyl Suppository 10 milliGRAM(s) Rectal daily PRN If no bowel movement by postoperative day #2  magnesium hydroxide Suspension 30 milliLiter(s) Oral daily PRN Constipation  ondansetron Injectable 4 milliGRAM(s) IV Push every 6 hours PRN Nausea and/or Vomiting  oxyCODONE    IR 5 milliGRAM(s) Oral every 4 hours PRN Moderate Pain (4 - 6)  senna 2 Tablet(s) Oral at bedtime PRN Constipation  sodium chloride 0.65% Nasal 1 Spray(s) Both Nostrils three times a day PRN Nasal Congestion      Care Discussed with Consultants/Other Providers [x] YES  [ ] NO    HEALTH ISSUES - PROBLEM Dx:  CAD (coronary artery disease): CAD (coronary artery disease)  Staphylococcal arthritis of right knee: Staphylococcal arthritis of right knee  Anemia due to blood loss: Anemia due to blood loss  Acute kidney injury superimposed on chronic kidney disease: Acute kidney injury superimposed on chronic kidney disease  DVT (deep venous thrombosis): DVT (deep venous thrombosis)  Total knee replacement status, right: Total knee replacement status, right  Pacemaker: Pacemaker  Asthma: Asthma

## 2018-04-04 NOTE — PROGRESS NOTE ADULT - ASSESSMENT
A/P s/p R total knee insertion of spacer, irrigation and debridement, complex wound closure (POD 11)  - Appreciate ID note, long term abx with dapto followed by PO minocycline and keflex  - Pain control  - DVT ppx; cont  - Care/dispo per primary; patient is accepted to [facility] but awaiting INR to drift down  - OOB  - Pulmonary toilet  - Xeroform to area of epidermolysis daily  - Please have patient follow up with Dr. Mg after discharge.     Vandana Mesa PGY2   P [phone]

## 2018-04-04 NOTE — PROGRESS NOTE ADULT - PROBLEM SELECTOR PROBLEM 2
Anemia due to blood loss
Acute kidney injury superimposed on chronic kidney disease
Anemia due to blood loss
Anemia due to blood loss
Staphylococcal arthritis of right knee
Acute kidney injury superimposed on chronic kidney disease
Acute kidney injury superimposed on chronic kidney disease
Staphylococcal arthritis of right knee

## 2018-04-08 ENCOUNTER — INPATIENT (INPATIENT)
Facility: HOSPITAL | Age: 78
LOS: 58 days | Discharge: NURSING, REHAB OR LONG-TERM CARE | DRG: 871 | End: 2018-06-06
Attending: INTERNAL MEDICINE | Admitting: INTERNAL MEDICINE
Payer: MEDICARE

## 2018-04-08 VITALS
SYSTOLIC BLOOD PRESSURE: 100 MMHG | RESPIRATION RATE: 22 BRPM | DIASTOLIC BLOOD PRESSURE: 52 MMHG | HEART RATE: 100 BPM | TEMPERATURE: 102 F | OXYGEN SATURATION: 98 %

## 2018-04-08 DIAGNOSIS — Z95.2 PRESENCE OF PROSTHETIC HEART VALVE: Chronic | ICD-10-CM

## 2018-04-08 DIAGNOSIS — A41.9 SEPSIS, UNSPECIFIED ORGANISM: ICD-10-CM

## 2018-04-08 DIAGNOSIS — Z96.659 PRESENCE OF UNSPECIFIED ARTIFICIAL KNEE JOINT: Chronic | ICD-10-CM

## 2018-04-08 DIAGNOSIS — Z95.0 PRESENCE OF CARDIAC PACEMAKER: Chronic | ICD-10-CM

## 2018-04-08 DIAGNOSIS — Z98.890 OTHER SPECIFIED POSTPROCEDURAL STATES: Chronic | ICD-10-CM

## 2018-04-08 LAB
ALBUMIN SERPL ELPH-MCNC: 1.6 G/DL — LOW (ref 3.3–5)
ALP SERPL-CCNC: 153 U/L — HIGH (ref 40–120)
ALT FLD-CCNC: 19 U/L RC — SIGNIFICANT CHANGE UP (ref 10–45)
ANION GAP SERPL CALC-SCNC: 14 MMOL/L — SIGNIFICANT CHANGE UP (ref 5–17)
APPEARANCE UR: ABNORMAL
APTT BLD: 56.1 SEC — HIGH (ref 27.5–37.4)
AST SERPL-CCNC: 34 U/L — SIGNIFICANT CHANGE UP (ref 10–40)
BASOPHILS # BLD AUTO: 0 K/UL — SIGNIFICANT CHANGE UP (ref 0–0.2)
BASOPHILS NFR BLD AUTO: 0.3 % — SIGNIFICANT CHANGE UP (ref 0–2)
BILIRUB SERPL-MCNC: 0.3 MG/DL — SIGNIFICANT CHANGE UP (ref 0.2–1.2)
BILIRUB UR-MCNC: NEGATIVE — SIGNIFICANT CHANGE UP
BUN SERPL-MCNC: 19 MG/DL — SIGNIFICANT CHANGE UP (ref 7–23)
CALCIUM SERPL-MCNC: 8.3 MG/DL — LOW (ref 8.4–10.5)
CHLORIDE SERPL-SCNC: 95 MMOL/L — LOW (ref 96–108)
CO2 SERPL-SCNC: 23 MMOL/L — SIGNIFICANT CHANGE UP (ref 22–31)
COLOR SPEC: YELLOW — SIGNIFICANT CHANGE UP
CREAT SERPL-MCNC: 0.94 MG/DL — SIGNIFICANT CHANGE UP (ref 0.5–1.3)
DIFF PNL FLD: NEGATIVE — SIGNIFICANT CHANGE UP
EOSINOPHIL # BLD AUTO: 0.2 K/UL — SIGNIFICANT CHANGE UP (ref 0–0.5)
EOSINOPHIL NFR BLD AUTO: 1.2 % — SIGNIFICANT CHANGE UP (ref 0–6)
GAS PNL BLDV: SIGNIFICANT CHANGE UP
GLUCOSE SERPL-MCNC: 124 MG/DL — HIGH (ref 70–99)
GLUCOSE UR QL: NEGATIVE — SIGNIFICANT CHANGE UP
HCT VFR BLD CALC: 26.6 % — LOW (ref 34.5–45)
HGB BLD-MCNC: 8.7 G/DL — LOW (ref 11.5–15.5)
INR BLD: 6.07 RATIO — CRITICAL HIGH (ref 0.88–1.16)
KETONES UR-MCNC: NEGATIVE — SIGNIFICANT CHANGE UP
LEUKOCYTE ESTERASE UR-ACNC: ABNORMAL
LYMPHOCYTES # BLD AUTO: 1.5 K/UL — SIGNIFICANT CHANGE UP (ref 1–3.3)
LYMPHOCYTES # BLD AUTO: 10.5 % — LOW (ref 13–44)
MCHC RBC-ENTMCNC: 28.8 PG — SIGNIFICANT CHANGE UP (ref 27–34)
MCHC RBC-ENTMCNC: 32.6 GM/DL — SIGNIFICANT CHANGE UP (ref 32–36)
MCV RBC AUTO: 88.5 FL — SIGNIFICANT CHANGE UP (ref 80–100)
MONOCYTES # BLD AUTO: 0.9 K/UL — SIGNIFICANT CHANGE UP (ref 0–0.9)
MONOCYTES NFR BLD AUTO: 6.4 % — SIGNIFICANT CHANGE UP (ref 2–14)
NEUTROPHILS # BLD AUTO: 11.4 K/UL — HIGH (ref 1.8–7.4)
NEUTROPHILS NFR BLD AUTO: 81.7 % — HIGH (ref 43–77)
NITRITE UR-MCNC: NEGATIVE — SIGNIFICANT CHANGE UP
PH UR: 6 — SIGNIFICANT CHANGE UP (ref 5–8)
PLATELET # BLD AUTO: 227 K/UL — SIGNIFICANT CHANGE UP (ref 150–400)
POTASSIUM SERPL-MCNC: 4.7 MMOL/L — SIGNIFICANT CHANGE UP (ref 3.5–5.3)
POTASSIUM SERPL-SCNC: 4.7 MMOL/L — SIGNIFICANT CHANGE UP (ref 3.5–5.3)
PROT SERPL-MCNC: 7.2 G/DL — SIGNIFICANT CHANGE UP (ref 6–8.3)
PROT UR-MCNC: 100 MG/DL
PROTHROM AB SERPL-ACNC: 68.6 SEC — HIGH (ref 9.8–12.7)
RAPID RVP RESULT: SIGNIFICANT CHANGE UP
RBC # BLD: 3.01 M/UL — LOW (ref 3.8–5.2)
RBC # FLD: 15.7 % — HIGH (ref 10.3–14.5)
SODIUM SERPL-SCNC: 132 MMOL/L — LOW (ref 135–145)
SP GR SPEC: 1.02 — SIGNIFICANT CHANGE UP (ref 1.01–1.02)
UROBILINOGEN FLD QL: NEGATIVE — SIGNIFICANT CHANGE UP
WBC # BLD: 13.9 K/UL — HIGH (ref 3.8–10.5)
WBC # FLD AUTO: 13.9 K/UL — HIGH (ref 3.8–10.5)

## 2018-04-08 PROCEDURE — 73700 CT LOWER EXTREMITY W/O DYE: CPT | Mod: 26,50

## 2018-04-08 PROCEDURE — 73562 X-RAY EXAM OF KNEE 3: CPT | Mod: 26,RT

## 2018-04-08 PROCEDURE — 71045 X-RAY EXAM CHEST 1 VIEW: CPT | Mod: 26

## 2018-04-08 PROCEDURE — 72192 CT PELVIS W/O DYE: CPT | Mod: 26

## 2018-04-08 PROCEDURE — 73552 X-RAY EXAM OF FEMUR 2/>: CPT | Mod: 26,RT

## 2018-04-08 PROCEDURE — 99291 CRITICAL CARE FIRST HOUR: CPT

## 2018-04-08 RX ORDER — SODIUM CHLORIDE 9 MG/ML
3 INJECTION INTRAMUSCULAR; INTRAVENOUS; SUBCUTANEOUS ONCE
Qty: 0 | Refills: 0 | Status: COMPLETED | OUTPATIENT
Start: 2018-04-08 | End: 2018-04-08

## 2018-04-08 RX ORDER — AZTREONAM 2 G
2000 VIAL (EA) INJECTION ONCE
Qty: 0 | Refills: 0 | Status: COMPLETED | OUTPATIENT
Start: 2018-04-08 | End: 2018-04-08

## 2018-04-08 RX ORDER — OXYCODONE HYDROCHLORIDE 5 MG/1
5 TABLET ORAL ONCE
Qty: 0 | Refills: 0 | Status: DISCONTINUED | OUTPATIENT
Start: 2018-04-08 | End: 2018-04-08

## 2018-04-08 RX ORDER — VANCOMYCIN HCL 1 G
1500 VIAL (EA) INTRAVENOUS ONCE
Qty: 0 | Refills: 0 | Status: COMPLETED | OUTPATIENT
Start: 2018-04-08 | End: 2018-04-09

## 2018-04-08 RX ORDER — ACETAMINOPHEN 500 MG
1000 TABLET ORAL ONCE
Qty: 0 | Refills: 0 | Status: COMPLETED | OUTPATIENT
Start: 2018-04-08 | End: 2018-04-08

## 2018-04-08 RX ORDER — SODIUM CHLORIDE 9 MG/ML
500 INJECTION INTRAMUSCULAR; INTRAVENOUS; SUBCUTANEOUS
Qty: 0 | Refills: 0 | Status: COMPLETED | OUTPATIENT
Start: 2018-04-08 | End: 2018-04-08

## 2018-04-08 RX ADMIN — SODIUM CHLORIDE 3 MILLILITER(S): 9 INJECTION INTRAMUSCULAR; INTRAVENOUS; SUBCUTANEOUS at 19:49

## 2018-04-08 RX ADMIN — SODIUM CHLORIDE 2000 MILLILITER(S): 9 INJECTION INTRAMUSCULAR; INTRAVENOUS; SUBCUTANEOUS at 20:53

## 2018-04-08 RX ADMIN — OXYCODONE HYDROCHLORIDE 5 MILLIGRAM(S): 5 TABLET ORAL at 22:46

## 2018-04-08 RX ADMIN — Medication 400 MILLIGRAM(S): at 19:49

## 2018-04-08 RX ADMIN — SODIUM CHLORIDE 2000 MILLILITER(S): 9 INJECTION INTRAMUSCULAR; INTRAVENOUS; SUBCUTANEOUS at 22:31

## 2018-04-08 RX ADMIN — Medication 100 MILLIGRAM(S): at 22:32

## 2018-04-08 RX ADMIN — SODIUM CHLORIDE 2000 MILLILITER(S): 9 INJECTION INTRAMUSCULAR; INTRAVENOUS; SUBCUTANEOUS at 19:50

## 2018-04-08 NOTE — ED PROVIDER NOTE - OBJECTIVE STATEMENT
78yo F with pmhx of AS, GERD, anemia CAD s/p HERBERT, OA, TKR with recent MRSA joint infection s/p explantantation and abx spacer on 12/23/17, developed RLE DVT. On 3/23/2018 had: Right knee explantation of previously placed articulating antibiotic spacer, irrigation and debridement of the knee, placement of static antibiotic spacer, with a Plastic Surgery soft tissue closure, patient was to complete 6 weeks of iv daptomycin to be completed on 5/5/18 followed by suppressive therapy of minocycline and/or keflex. Patient was in [facility] since discharge.  Patient reports she began feeling weakness x2days.  [facility] facility documented fever of 102.0 today with O2 stat of 82% on RA so was sent to ER. Patient denies chest pain, dizziness, abdominal pain, N/v, diarrhea, blood stool, cough. 76yo F with pmhx of AS, GERD, anemia CAD s/p HERBERT, OA, TKR with recent MRSA joint infection s/p explantantation and abx spacer on 17, developed RLE DVT. On 3/23/2018 had: Right knee explantation of previously placed articulating antibiotic spacer, irrigation and debridement of the knee, placement of static antibiotic spacer, with a Plastic Surgery soft tissue closure, patient was to complete 6 weeks of iv daptomycin to be completed on 18 followed by suppressive therapy of minocycline and/or keflex. Patient was in [facility] since discharge.  Patient reports she began feeling weakness x2days.  [facility] facility documented fever of 102.0 today with O2 stat of 82% on RA so was sent to ER. Patient denies chest pain, dizziness, abdominal pain, N/v, diarrhea, blood stool, cough.    Attendinyo female presents with fever to 102.  Pt on IV daptomycin for right knee infection.  Fever started yesterday.  Also with hypoxia to 81% on room air.  Pt also developed new area of pain to proximal thigh.

## 2018-04-08 NOTE — ED ADULT TRIAGE NOTE - CHIEF COMPLAINT QUOTE
c/o fever c/o fever, 86% oxygen sat on room air as per EMS, pt is being treated for with recent MRSA on right knee

## 2018-04-08 NOTE — ED PROVIDER NOTE - PMH
Anxiety    Aortic stenosis, severe    CAD (coronary artery disease)  HERBERT to LAD 11/2016  Dysphagia    GERD (gastroesophageal reflux disease)    Hiatal hernia    Lumbar disc disease with radiculopathy    Macular degeneration    Osteoarthritis    Polymyalgia    Sciatica    Severe aortic stenosis by prior echocardiogram  2013 and  11/2016  Spider veins    Uncomplicated asthma, unspecified asthma severity

## 2018-04-08 NOTE — CONSULT NOTE ADULT - SUBJECTIVE AND OBJECTIVE BOX
77F sent to ED from rehab for fevers and desaturation on room air, consulted to r/o R knee infection. She has a h/o R septic TKA, and is s/p abx spacer placement with I&D and plastics closure back on 3/23/18. She denies any increasing pain to the affected knee. Denies any paresthesias or muscle weakness to the affected extremity. Leg is currently in a navarro brace. +fevers/chills at rehab (Tmax 102). She has no other complaints.    HEALTH ISSUES - PROBLEM Dx:  HTN, GERD, Sciatica, CAD, AS, Anxiety, Polymyalgia, Obesity, S/p R Knee Abx spacer exchange & I&D (3/23/18).      MEDICATIONS  (STANDING):  sodium chloride 0.9% Bolus 500 milliLiter(s) IV Bolus every 15 minutes  vancomycin  IVPB 1500 milliGRAM(s) IV Intermittent once    Allergies    amoxicillin (Other)    Intolerances    IV Contrast (Flushing (Skin); Nausea)                          8.7    13.9  )-----------( 227      ( 08 Apr 2018 19:56 )             26.6     08 Apr 2018 19:56    132    |  95     |  19     ----------------------------<  124    4.7     |  23     |  0.94     Ca    8.3        08 Apr 2018 19:56    TPro  7.2    /  Alb  1.6    /  TBili  0.3    /  DBili  x      /  AST  34     /  ALT  19     /  AlkPhos  153    08 Apr 2018 19:56    PT/INR - ( 08 Apr 2018 19:56 )   PT: 68.6 sec;   INR: 6.07 ratio         PTT - ( 08 Apr 2018 19:56 )  PTT:56.1 sec  Vital Signs Last 24 Hrs  T(C): 36.7 (04-08-18 @ 22:41), Max: 39.7 (04-08-18 @ 19:48)  T(F): 98.1 (04-08-18 @ 22:41), Max: 103.5 (04-08-18 @ 19:48)  HR: 81 (04-08-18 @ 22:41) (81 - 102)  BP: 109/63 (04-08-18 @ 22:41) (100/52 - 130/68)  BP(mean): 77 (04-08-18 @ 22:41) (77 - 77)  RR: 18 (04-08-18 @ 22:41) (18 - 22)  SpO2: 97% (04-08-18 @ 22:41) (95% - 98%)    Physical Exam  Gen: NAD  RLE:  Dressing and navarro in place, removed for inspection  area of eschar at distal aspect of wound, unchanged from previous admission, no pustulance or drainage  area of erythema at inguinal crease, likely secondary to irritation from the brace   +ehl/fhl/ta/gs function  L2-S1 silt  Dp/pt pulse intact  No calf ttp  Compartments soft    A/P: 77y Female admitted for sepsis, h/o R TKA infection w recent placement of abx spacer and I&D  Admit to medicine  Pain control  DVT ppx  PT  50% PWB in brace, no extension  Low suspicion for knee as source of infection  FU labs/cultures  Abx as per med/ID  Recc obtain ID consult  Discussed with Dr Bass, agrees with above plan

## 2018-04-08 NOTE — ED ADULT NURSE NOTE - CHIEF COMPLAINT QUOTE
c/o fever, 86% oxygen sat on room air as per EMS, pt is being treated for with recent MRSA on right knee

## 2018-04-08 NOTE — CONSULT NOTE ADULT - SUBJECTIVE AND OBJECTIVE BOX
HPI:  Patient is a 77F who was recently discharged from [facility] after having undergone right knee debridement and exchange of antibiotic spacer 3/23 with complex wound closure.  Pt previously had TKR c/b infection requiring VIOLA and placement of spacer 2017. Pt was discharged from [facility] to rehab on 18.  At time of discharge, wound overlying right knee had area of eschar overlying patella with surrounding ecchymoses without purulence or evidence of incisional breakdown. Patient's family contacted Plastic Surgery attending (Dr. Mg) to inform him of fevers and were advised to come to the ED.        PMH  CAD (coronary artery disease)  Aortic stenosis, severe  Uncomplicated asthma, unspecified asthma severity  Polymyalgia  Lumbar disc disease with radiculopathy  Spider veins  Anxiety  Severe aortic stenosis by prior echocardiogram  Dysphagia  Morbid obesity with BMI of 50.0-59.9, adult  Macular degeneration  Hiatal hernia  GERD (gastroesophageal reflux disease)  Sciatica  Osteoarthritis  HTN (hypertension)    PSH  S/P TKR (total knee replacement)  S/P TAVR (transcatheter aortic valve replacement)  Artificial cardiac pacemaker  H/O cardiac catheterization  H/O endoscopy  S/P cataract surgery  S/P gastroplasty  S/P cholecystectomy  S/P total knee replacement  S/P total knee replacement  S/P hysterectomy    MEDS    Allergies    amoxicillin (Other)    Intolerances    IV Contrast (Flushing (Skin); Nausea)              Physical Exam  T(C): 39.7 (18 @ 19:48), Max: 39.7 (18 @ 19:48)  HR: 98 (18 @ 19:48) (98 - 102)  BP: 130/68 (18 @ 19:01) (100/52 - 130/68)  RR: 19 (18 @ 19:48) (19 - 22)  SpO2: 97% (18 @ 19:48) (95% - 98%)  Wt(kg): --  Tmax: T(C): , Max: 39.7 (18 @ 19:48)  Wt(kg): --    Gen: Appears in moderate distress, diaphoretic  Extrem: RLE wound not significantly changed from prior exam (); area of eschar stable, slightly larger area of demarcation, without underlying fluid collection, incision intact; unable to express purulence. Wound is not malodorous. Wound is tender along medial patella with some mild erythema.    Proximal to surgical site, at right inguinal crease/proximal thigh, there is an area of blanching erythema with significant tenderness and induration along the anterior thigh measuring 30cm x 16cm. No obvious abrasion or puncture site within area of erythema; serous blister noted along medial thigh extending posteriorly.      Labs:                        8.7    13.9  )-----------( 227      ( 2018 19:56 )             26.6         132<L>  |  95<L>  |  19  ----------------------------<  124<H>  4.7   |  23  |  0.94    Ca    8.3<L>      2018 19:56    TPro  7.2  /  Alb  1.6<L>  /  TBili  0.3  /  DBili  x   /  AST  34  /  ALT  19  /  AlkPhos  153<H>      PT/INR - ( 2018 19:56 )   PT: 68.6 sec;   INR: 6.07 ratio         PTT - ( 2018 19:56 )  PTT:56.1 sec  Urinalysis Basic - ( 2018 19:56 )    Color: Yellow / Appearance: SL Turbid / S.024 / pH: x  Gluc: x / Ketone: Negative  / Bili: Negative / Urobili: Negative   Blood: x / Protein: 100 mg/dL / Nitrite: Negative   Leuk Esterase: Moderate / RBC: 3-5 /HPF / WBC 11-25 /HPF   Sq Epi: x / Non Sq Epi: OCC /HPF / Bacteria: Few /HPF            Imaging  Pending

## 2018-04-08 NOTE — CONSULT NOTE ADULT - ASSESSMENT
A/P 77F recently discharged to rehab after undergoing replacement of antibiotic spacer to right knee with complex wound closure (3/23), presenting to [facility] with fevers  - Patient meets sepsis criteria, unlikely that source is right knee based on clinical exam  - Recommend continuing with local wound care to knee: daily dressing changes with xeroform, ABD pads, ACE wrap, and knee immobilizer  - Recommend medicine, ortho (Dr. Bass), medicine consult  - Will follow up imaging to assess for collection at surgical site  - Will continue to follow patient while she is in house    Discussed with Dr Saurav Mesa PGY2

## 2018-04-08 NOTE — CONSULT NOTE ADULT - ATTENDING COMMENTS
I agree with the above note and have personally seen and examined this patient. All pertinent films have been reviewed. Please refer to clinical documentation of the history, physical examinations, data summary, and both assessment and plan as documented above and with which I agree.    In short, this is a 78yo F who recently underwent a R knee I&D and antibiotic spacer exchange in the process of treating a TKR PJI. 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 The plastic surgery service, Dr. Mg helped with wound management and closure. Postoperatively this was complicated at [facility] by DVT in setting of discontinuation of her DVT ppx after a nose bleed as well as wound dehiscence with sinus tract development after wound care performed a full thickness skin debridement at the patient's bedside. At the recent I&D and exchange of the articulating spacer to a static spacer on 3/23/18, cultures grew MRSA. ID has been managing the R knee PJI with daptomycin and the patient was discharged postoperatively to [facility] again. While there she developed fevers/elevated WBC/supratherapeutic INR and was sent back to [facility] ED.   Additionally, while at [facility] she refused PT and refused to get oob with PT to bedside chair.    Physical Exam  NAD, interactive  RLE:  Dressing and navarro in place, removed for inspection  area of eschar at medial distal aspect of wound, no purulence or drainage  medial groin/thigh erythema near area where tourniquet was applied, not fluctuant, firm  +ehl/fhl/ta/gs function  SILT L4-S1  Dp/pt pulse intact  Compartments soft    Femur/tibia/knee/CT Radiographs demonstrate s/p R knee static spacer placement    77y Female admitted for sepsis, h/o R TKA PJI w recent placement of new abx spacer and I&D  Appreciate medicine team management of this medically complex patient  Wound care per plastics team, Dr. Mg  ID team recommending daptomycin, recommend repeat bcx  Pain control  DVT ppx, coumadin, goal INR 2-3, however now supratherapeutic, has filter in place  PT/OT, oob to chair if able  50% PWB in brace, no knee flexion allowed, may remove brace while in bed  Recommend general surgery consult for work up of medial thigh erythema, possible fluid collection although not seen on CT, may represent hematoma in setting up supratherapeutic INR as this is the area where the tourniquet was located     Nate Bass MD  Attending Orthopedic Surgeon

## 2018-04-08 NOTE — ED PROVIDER NOTE - MEDICAL DECISION MAKING DETAILS
76yo F with recent MRSA knee infection & surgery  imp: sepsis   EKG, CXR, UA, blood cultures, labs, IVF 76yo F with recent MRSA knee infection & surgery  imp: sepsis likely due to skin infection  EKG, CXR, UA, blood cultures, labs, IVF

## 2018-04-08 NOTE — ED PROVIDER NOTE - MUSCULOSKELETAL MINIMAL EXAM
Right leg wrapped in ACE and in knee immoblizer Right leg wrapped in ACE and in knee immoblizer, removed Immoblizer 1ft infecison closed weith staples, no purluent discharge, right [id number] warm erythematous area with fluctuance.  2nd degree sacral ulcer. .

## 2018-04-08 NOTE — ED ADULT NURSE NOTE - OBJECTIVE STATEMENT
78 y/o female presented to the ED via ambulate services from Rehab c/o fever and pain in R knee. pt s/p spacer in R knee on 12/23/2017 with MRSA infection. pt is on antibiotics therapy through R upper arm PICC. on assessment pt is A&Ox3, room air pulse stat 82%, place on supplemental oxygen. pt is on 3L NC at 97%. pt placed on monitor and EKG performed. pt A&x3, Neuro intact. PERRL. Lungs diminished. pt states been lethargic and tired for the past two days. pt BS +, no pain on palpation to abdomen. pt has wound to R knee. wrapped in wound care. redness and warmth noted to R upper leg. pt has stage 2 on sacrum. MD next to bedside. awaiting MD dispo.

## 2018-04-08 NOTE — ED ADULT NURSE NOTE - CHPI ED SYMPTOMS NEG
no decreased eating/drinking/no headache/no abdominal pain/no chills/no rash/no diarrhea/no vomiting

## 2018-04-08 NOTE — ED PROVIDER NOTE - CRITICAL CARE PROVIDED
documentation/direct patient care (not related to procedure)/consult w/ pt's family directly relating to pts condition/interpretation of diagnostic studies/consultation with other physicians/additional history taking

## 2018-04-09 ENCOUNTER — APPOINTMENT (OUTPATIENT)
Dept: PULMONOLOGY | Facility: CLINIC | Age: 78
End: 2018-04-09

## 2018-04-09 DIAGNOSIS — M25.569 PAIN IN UNSPECIFIED KNEE: ICD-10-CM

## 2018-04-09 DIAGNOSIS — J45.909 UNSPECIFIED ASTHMA, UNCOMPLICATED: ICD-10-CM

## 2018-04-09 DIAGNOSIS — I82.509 CHRONIC EMBOLISM AND THROMBOSIS OF UNSPECIFIED DEEP VEINS OF UNSPECIFIED LOWER EXTREMITY: ICD-10-CM

## 2018-04-09 DIAGNOSIS — E78.5 HYPERLIPIDEMIA, UNSPECIFIED: ICD-10-CM

## 2018-04-09 DIAGNOSIS — I25.10 ATHEROSCLEROTIC HEART DISEASE OF NATIVE CORONARY ARTERY WITHOUT ANGINA PECTORIS: ICD-10-CM

## 2018-04-09 DIAGNOSIS — R50.9 FEVER, UNSPECIFIED: ICD-10-CM

## 2018-04-09 DIAGNOSIS — K21.9 GASTRO-ESOPHAGEAL REFLUX DISEASE WITHOUT ESOPHAGITIS: ICD-10-CM

## 2018-04-09 LAB
ANION GAP SERPL CALC-SCNC: 11 MMOL/L — SIGNIFICANT CHANGE UP (ref 5–17)
ANISOCYTOSIS BLD QL: SLIGHT — SIGNIFICANT CHANGE UP
APTT BLD: 88.6 SEC — HIGH (ref 27.5–37.4)
BASOPHILS # BLD AUTO: 0.05 K/UL — SIGNIFICANT CHANGE UP (ref 0–0.2)
BASOPHILS NFR BLD AUTO: 0.4 % — SIGNIFICANT CHANGE UP (ref 0–2)
BUN SERPL-MCNC: 16 MG/DL — SIGNIFICANT CHANGE UP (ref 7–23)
CALCIUM SERPL-MCNC: 8 MG/DL — LOW (ref 8.4–10.5)
CHLORIDE SERPL-SCNC: 96 MMOL/L — SIGNIFICANT CHANGE UP (ref 96–108)
CK SERPL-CCNC: 28 U/L — SIGNIFICANT CHANGE UP (ref 25–170)
CO2 SERPL-SCNC: 24 MMOL/L — SIGNIFICANT CHANGE UP (ref 22–31)
CREAT SERPL-MCNC: 0.84 MG/DL — SIGNIFICANT CHANGE UP (ref 0.5–1.3)
CRP SERPL-MCNC: 35.5 MG/DL — HIGH (ref 0–0.4)
CULTURE RESULTS: SIGNIFICANT CHANGE UP
EOSINOPHIL # BLD AUTO: 0.26 K/UL — SIGNIFICANT CHANGE UP (ref 0–0.5)
EOSINOPHIL NFR BLD AUTO: 2.1 % — SIGNIFICANT CHANGE UP (ref 0–6)
ERYTHROCYTE [SEDIMENTATION RATE] IN BLOOD: >140 MM/HR — SIGNIFICANT CHANGE UP (ref 0–20)
GLUCOSE SERPL-MCNC: 119 MG/DL — HIGH (ref 70–99)
HCT VFR BLD CALC: 23.8 % — LOW (ref 34.5–45)
HCT VFR BLD CALC: 25.1 % — LOW (ref 34.5–45)
HGB BLD-MCNC: 7.4 G/DL — LOW (ref 11.5–15.5)
HGB BLD-MCNC: 8 G/DL — LOW (ref 11.5–15.5)
HYPOCHROMIA BLD QL: SLIGHT — SIGNIFICANT CHANGE UP
IMM GRANULOCYTES NFR BLD AUTO: 0.4 % — SIGNIFICANT CHANGE UP (ref 0–1.5)
INR BLD: 6.24 RATIO — CRITICAL HIGH (ref 0.88–1.16)
INR BLD: 6.56 RATIO — CRITICAL HIGH (ref 0.88–1.16)
LACTATE SERPL-SCNC: 0.9 MMOL/L — SIGNIFICANT CHANGE UP (ref 0.7–2)
LYMPHOCYTES # BLD AUTO: 1 K/UL — SIGNIFICANT CHANGE UP (ref 1–3.3)
LYMPHOCYTES # BLD AUTO: 8 % — LOW (ref 13–44)
MAGNESIUM SERPL-MCNC: 1.6 MG/DL — SIGNIFICANT CHANGE UP (ref 1.6–2.6)
MANUAL SMEAR VERIFICATION: SIGNIFICANT CHANGE UP
MCHC RBC-ENTMCNC: 27.3 PG — SIGNIFICANT CHANGE UP (ref 27–34)
MCHC RBC-ENTMCNC: 28.2 PG — SIGNIFICANT CHANGE UP (ref 27–34)
MCHC RBC-ENTMCNC: 31.1 GM/DL — LOW (ref 32–36)
MCHC RBC-ENTMCNC: 32.1 GM/DL — SIGNIFICANT CHANGE UP (ref 32–36)
MCV RBC AUTO: 87.8 FL — SIGNIFICANT CHANGE UP (ref 80–100)
MCV RBC AUTO: 88.1 FL — SIGNIFICANT CHANGE UP (ref 80–100)
MONOCYTES # BLD AUTO: 0.97 K/UL — HIGH (ref 0–0.9)
MONOCYTES NFR BLD AUTO: 7.8 % — SIGNIFICANT CHANGE UP (ref 2–14)
NEUTROPHILS # BLD AUTO: 10.11 K/UL — HIGH (ref 1.8–7.4)
NEUTROPHILS NFR BLD AUTO: 81.3 % — HIGH (ref 43–77)
PHOSPHATE SERPL-MCNC: 2.8 MG/DL — SIGNIFICANT CHANGE UP (ref 2.5–4.5)
PLAT MORPH BLD: NORMAL — SIGNIFICANT CHANGE UP
PLATELET # BLD AUTO: 207 K/UL — SIGNIFICANT CHANGE UP (ref 150–400)
PLATELET # BLD AUTO: 224 K/UL — SIGNIFICANT CHANGE UP (ref 150–400)
POTASSIUM SERPL-MCNC: 3.8 MMOL/L — SIGNIFICANT CHANGE UP (ref 3.5–5.3)
POTASSIUM SERPL-SCNC: 3.8 MMOL/L — SIGNIFICANT CHANGE UP (ref 3.5–5.3)
PROTHROM AB SERPL-ACNC: 70 SEC — HIGH (ref 9.8–12.7)
PROTHROM AB SERPL-ACNC: 77 SEC — HIGH (ref 10–13.1)
RBC # BLD: 2.71 M/UL — LOW (ref 3.8–5.2)
RBC # BLD: 2.85 M/UL — LOW (ref 3.8–5.2)
RBC # FLD: 15.5 % — HIGH (ref 10.3–14.5)
RBC # FLD: 16.7 % — HIGH (ref 10.3–14.5)
RBC BLD AUTO: SIGNIFICANT CHANGE UP
SODIUM SERPL-SCNC: 131 MMOL/L — LOW (ref 135–145)
SPECIMEN SOURCE: SIGNIFICANT CHANGE UP
WBC # BLD: 12.44 K/UL — HIGH (ref 3.8–10.5)
WBC # BLD: 13.3 K/UL — HIGH (ref 3.8–10.5)
WBC # FLD AUTO: 12.44 K/UL — HIGH (ref 3.8–10.5)
WBC # FLD AUTO: 13.3 K/UL — HIGH (ref 3.8–10.5)

## 2018-04-09 PROCEDURE — 71250 CT THORAX DX C-: CPT | Mod: 26

## 2018-04-09 PROCEDURE — 99223 1ST HOSP IP/OBS HIGH 75: CPT | Mod: AI

## 2018-04-09 PROCEDURE — 74176 CT ABD & PELVIS W/O CONTRAST: CPT | Mod: 26

## 2018-04-09 RX ORDER — ACETAMINOPHEN 500 MG
650 TABLET ORAL EVERY 6 HOURS
Qty: 0 | Refills: 0 | Status: DISCONTINUED | OUTPATIENT
Start: 2018-04-09 | End: 2018-04-16

## 2018-04-09 RX ORDER — AZTREONAM 2 G
2000 VIAL (EA) INJECTION EVERY 8 HOURS
Qty: 0 | Refills: 0 | Status: COMPLETED | OUTPATIENT
Start: 2018-04-09 | End: 2018-04-14

## 2018-04-09 RX ORDER — FERROUS SULFATE 325(65) MG
325 TABLET ORAL DAILY
Qty: 0 | Refills: 0 | Status: DISCONTINUED | OUTPATIENT
Start: 2018-04-09 | End: 2018-06-06

## 2018-04-09 RX ORDER — ASCORBIC ACID 60 MG
500 TABLET,CHEWABLE ORAL DAILY
Qty: 0 | Refills: 0 | Status: DISCONTINUED | OUTPATIENT
Start: 2018-04-09 | End: 2018-06-06

## 2018-04-09 RX ORDER — DAPTOMYCIN 500 MG/10ML
900 INJECTION, POWDER, LYOPHILIZED, FOR SOLUTION INTRAVENOUS EVERY 24 HOURS
Qty: 0 | Refills: 0 | Status: DISCONTINUED | OUTPATIENT
Start: 2018-04-09 | End: 2018-04-10

## 2018-04-09 RX ORDER — DOCUSATE SODIUM 100 MG
100 CAPSULE ORAL THREE TIMES A DAY
Qty: 0 | Refills: 0 | Status: DISCONTINUED | OUTPATIENT
Start: 2018-04-09 | End: 2018-06-06

## 2018-04-09 RX ORDER — OXYCODONE HYDROCHLORIDE 5 MG/1
5 TABLET ORAL EVERY 4 HOURS
Qty: 0 | Refills: 0 | Status: DISCONTINUED | OUTPATIENT
Start: 2018-04-09 | End: 2018-04-16

## 2018-04-09 RX ORDER — POLYETHYLENE GLYCOL 3350 17 G/17G
17 POWDER, FOR SOLUTION ORAL DAILY
Qty: 0 | Refills: 0 | Status: DISCONTINUED | OUTPATIENT
Start: 2018-04-09 | End: 2018-06-06

## 2018-04-09 RX ORDER — SENNA PLUS 8.6 MG/1
2 TABLET ORAL AT BEDTIME
Qty: 0 | Refills: 0 | Status: DISCONTINUED | OUTPATIENT
Start: 2018-04-09 | End: 2018-06-06

## 2018-04-09 RX ORDER — PANTOPRAZOLE SODIUM 20 MG/1
40 TABLET, DELAYED RELEASE ORAL
Qty: 0 | Refills: 0 | Status: DISCONTINUED | OUTPATIENT
Start: 2018-04-09 | End: 2018-06-06

## 2018-04-09 RX ORDER — ASPIRIN/CALCIUM CARB/MAGNESIUM 324 MG
81 TABLET ORAL DAILY
Qty: 0 | Refills: 0 | Status: DISCONTINUED | OUTPATIENT
Start: 2018-04-09 | End: 2018-04-20

## 2018-04-09 RX ORDER — ACETAMINOPHEN 500 MG
650 TABLET ORAL ONCE
Qty: 0 | Refills: 0 | Status: COMPLETED | OUTPATIENT
Start: 2018-04-09 | End: 2018-04-09

## 2018-04-09 RX ORDER — FOLIC ACID 0.8 MG
1 TABLET ORAL DAILY
Qty: 0 | Refills: 0 | Status: DISCONTINUED | OUTPATIENT
Start: 2018-04-09 | End: 2018-06-06

## 2018-04-09 RX ORDER — BUDESONIDE AND FORMOTEROL FUMARATE DIHYDRATE 160; 4.5 UG/1; UG/1
2 AEROSOL RESPIRATORY (INHALATION)
Qty: 0 | Refills: 0 | Status: DISCONTINUED | OUTPATIENT
Start: 2018-04-09 | End: 2018-06-06

## 2018-04-09 RX ADMIN — BUDESONIDE AND FORMOTEROL FUMARATE DIHYDRATE 2 PUFF(S): 160; 4.5 AEROSOL RESPIRATORY (INHALATION) at 07:18

## 2018-04-09 RX ADMIN — Medication 650 MILLIGRAM(S): at 22:11

## 2018-04-09 RX ADMIN — Medication 100 MILLIGRAM(S): at 15:02

## 2018-04-09 RX ADMIN — OXYCODONE HYDROCHLORIDE 5 MILLIGRAM(S): 5 TABLET ORAL at 10:25

## 2018-04-09 RX ADMIN — SODIUM CHLORIDE 2000 MILLILITER(S): 9 INJECTION INTRAMUSCULAR; INTRAVENOUS; SUBCUTANEOUS at 00:13

## 2018-04-09 RX ADMIN — DAPTOMYCIN 136 MILLIGRAM(S): 500 INJECTION, POWDER, LYOPHILIZED, FOR SOLUTION INTRAVENOUS at 10:35

## 2018-04-09 RX ADMIN — Medication 325 MILLIGRAM(S): at 12:27

## 2018-04-09 RX ADMIN — OXYCODONE HYDROCHLORIDE 5 MILLIGRAM(S): 5 TABLET ORAL at 13:16

## 2018-04-09 RX ADMIN — Medication 250 MILLIGRAM(S): at 00:14

## 2018-04-09 RX ADMIN — POLYETHYLENE GLYCOL 3350 17 GRAM(S): 17 POWDER, FOR SOLUTION ORAL at 12:27

## 2018-04-09 RX ADMIN — Medication 100 MILLIGRAM(S): at 07:16

## 2018-04-09 RX ADMIN — Medication 1 TABLET(S): at 23:26

## 2018-04-09 RX ADMIN — Medication 100 MILLIGRAM(S): at 23:25

## 2018-04-09 RX ADMIN — Medication 500 MILLIGRAM(S): at 12:27

## 2018-04-09 RX ADMIN — OXYCODONE HYDROCHLORIDE 5 MILLIGRAM(S): 5 TABLET ORAL at 00:13

## 2018-04-09 RX ADMIN — OXYCODONE HYDROCHLORIDE 5 MILLIGRAM(S): 5 TABLET ORAL at 14:25

## 2018-04-09 RX ADMIN — Medication 100 MILLIGRAM(S): at 23:23

## 2018-04-09 RX ADMIN — Medication 81 MILLIGRAM(S): at 12:27

## 2018-04-09 RX ADMIN — OXYCODONE HYDROCHLORIDE 5 MILLIGRAM(S): 5 TABLET ORAL at 09:25

## 2018-04-09 RX ADMIN — Medication 1 TABLET(S): at 15:02

## 2018-04-09 RX ADMIN — Medication 1 MILLIGRAM(S): at 12:27

## 2018-04-09 RX ADMIN — Medication 1 TABLET(S): at 12:27

## 2018-04-09 RX ADMIN — BUDESONIDE AND FORMOTEROL FUMARATE DIHYDRATE 2 PUFF(S): 160; 4.5 AEROSOL RESPIRATORY (INHALATION) at 18:53

## 2018-04-09 NOTE — PATIENT PROFILE ADULT. - CAREGIVER ADDRESS
[address] [place] [id number]

## 2018-04-09 NOTE — H&P ADULT - PROBLEM SELECTOR PLAN 6
cont prn tylenol and oxycodone   hold off on cyclobenzaprine given acute sepsis picture  wound care in am for knee  f/u ortho and plastics

## 2018-04-09 NOTE — H&P ADULT - PROBLEM SELECTOR PLAN 1
pt with new fever on daptomycin for prosthesis infection  ? new source - borderline UA but no sx, no s/s pna  ? OM on knee xr, f/u official CT  given elevated ESR concern for OM vs SBE  for now will cont daptomycin (ck ck in am) and start aztreonam given pcn allergy  ID consult in am

## 2018-04-09 NOTE — ADVANCED PRACTICE NURSE CONSULT - ASSESSMENT
As patient has been assessed by plastic surgery who had completed wound closure on 3/23/2018 will defer to that service for the care and management of the wound.

## 2018-04-09 NOTE — PATIENT PROFILE ADULT. - EXTENSIONS OF SELF_ADULT
----- Message from Laura Jiménez MD sent at 12/25/2017 11:56 AM CST -----  Uric acid level low at 4.6  Creatinine is better  Inflammation noted in the left knee joint fluid but no crystals, so gout possible but RA also possible, will discuss next visit      None

## 2018-04-09 NOTE — ADVANCED PRACTICE NURSE CONSULT - REASON FOR CONSULT
Requested by nursing staff to assess surgical knee wound.  Patient is a Review of history and patient recent course:  76yo f PMH including HLD, GERD, Anxiety, Anemia, CAD s/p HERBERT, severe AS (s/p TAVR & PPM 12/17 [employer] Model [id number]), h/o MVR, PMR on chronic steroids, asthma, OA, s/p TKR with recent joint infection s/p explant and abx spacer on 12/23/17, + rehab when had RLE DVT (dvt proph was held 2nd nose bleed)  on Coumadins/p  3/23/2018 Right knee explantation of previously placed articulating antibiotic spacer, irrigation and debridement of the knee, placement of static antibiotic spacer, with a Plastic Surgery soft tissue complex wound closure.   per ID: complete 6 weeks of iv daptomycin (end 5/5) and then prolonged po keflex and minocycline, local wound care per plastics monitor CMP, CBC with differential and CPK weekly.   Orthopedics consulted: continue antibiotics as per ID and wound treatment as per plastics. Ice, elevate 50% PWD. Pt currently no knee ROM.   Pt was consulted by Cardiology for short episode of Vtach: Pt started on low dose Beta blocker and tolerated. Coumadin for DVT. Monitor INR.  HIstory via patient and aide at bedside.  Originally at rehab pt was developing worsening weakness.  Overall poor appetite.  Developed fever/chills yesterday and taken to ED for further work up.  Seen by ortho and plastics in ED with fever to 103.5.  Given vanco/aztreonam, tylenol and oxycodone 5mg x 1.

## 2018-04-09 NOTE — CHART NOTE - NSCHARTNOTEFT_GEN_A_CORE
I saw the patient tonight. Attending note from today is attached to resident consult note 4/8/18.    Plan  Appreciate medicine team management of this medically complex patient  Wound care per plastics team, Dr. Mg, local wound care for now  ID team recommending daptomycin/azetreonam, recommend repeat bcx  Pain control, minimize nartotics as able as this makes her more somnolent  DVT ppx, coumadin, goal INR 2-3, however now supratherapeutic, has filter in place  PT/OT, oob to chair if able  50% PWB in brace, no knee flexion allowed, may remove brace while in bed  Recommend general surgery consult for work up of medial thigh erythema, possible fluid collection although not seen on CT, may represent hematoma in setting up supratherapeutic INR as this is the area where the tourniquet was located  fluid collection although not seen on CT, may represent hematoma in setting up supratherapeutic INR as this is the area where the tourniquet was located     Nate Bass MD  Orthopedic Surgery Attending

## 2018-04-09 NOTE — H&P ADULT - NEGATIVE GASTROINTESTINAL SYMPTOMS
no abdominal pain/no hematochezia/no constipation/no melena/no diarrhea/no vomiting/no nausea/no change in bowel habits

## 2018-04-09 NOTE — H&P ADULT - NSHPLABSRESULTS_GEN_ALL_CORE
labs/studies/ekg reviewed personally by me  ESR > 140  wbc 13.9, hb 8.7, plt 227  na 132, bun 19, creat 0.94  rvp neg  lact 2.2  ua borderline +  XR ? om of knee  1/18 ekg sinus, unable to find current ekg (repeat ordered by me)

## 2018-04-09 NOTE — CONSULT NOTE ADULT - SUBJECTIVE AND OBJECTIVE BOX
HPI:   Patient is a 77y female with tavr, aicd, remote bilateral tkr who developed strept bacteremia and seeding of the right tkr in December after dental work. ERIKA showed no veg. She had rudy and spacer and extended iv ceftriaxone then some po abx. She returned to hospital last month with poorly healing right knee wound so she had rudy, new spacer and plastic closure of the wound and this time grew mrsa. She has been on daptomycin for one specimen with high vanco elier and some transient kasia. She continues to have an eschar over the knee followed by plastics. She developed an area of purple red on the right thigh above the brace. She went has been maintained on monitored bed for some vt. she went to rehab 4 days ago and now returns with fever, higher wbc, very weak and lots of pain in the right leg. We are called. patient not cooperating with answering full ros answering "I dont' know" to most questions.     REVIEW OF SYSTEMS:  All other review of systems negative (Comprehensive ROS)    PAST MEDICAL & SURGICAL HISTORY:  CAD (coronary artery disease): HERBERT to LAD 2016  Aortic stenosis, severe  Uncomplicated asthma, unspecified asthma severity  Polymyalgia  Lumbar disc disease with radiculopathy  Spider veins  Anxiety  Severe aortic stenosis by prior echocardiogram:  and  2016  Dysphagia  Macular degeneration  Hiatal hernia  GERD (gastroesophageal reflux disease)  Sciatica  Osteoarthritis  S/P TKR (total knee replacement): joint infection s/p explant and abx spacer on 17  S/P TAVR (transcatheter aortic valve replacement): 17  Artificial cardiac pacemaker: 17  H/O cardiac catheterization: 16 HERBERT placed on LAD  H/O endoscopy: with dilatation of esophageal stricture   S/P cataract surgery: x2; 3-4yr ago  S/P gastroplasty: 28 yrs ago  S/P cholecystectomy: more than 15 years ago  S/P total knee replacement: left, 15yr ago  S/P total knee replacement: right, 12yr ago  S/P hysterectomy: 24yr ago      Allergies    amoxicillin (Other)    Intolerances    IV Contrast (Flushing (Skin); Nausea)      Antimicrobials Day #  :  aztreonam  IVPB 2000 milliGRAM(s) IV Intermittent every 8 hours  DAPTOmycin IVPB 900 milliGRAM(s) IV Intermittent every 24 hours    Other Medications:  acetaminophen   Tablet. 650 milliGRAM(s) Oral every 6 hours PRN  ascorbic acid 500 milliGRAM(s) Oral daily  aspirin enteric coated 81 milliGRAM(s) Oral daily  buDESOnide 160 MICROgram(s)/formoterol 4.5 MICROgram(s) Inhaler 2 Puff(s) Inhalation two times a day  calcium carbonate 1250 mG + Vitamin D (OsCal 500 + D) 1 Tablet(s) Oral three times a day  docusate sodium 100 milliGRAM(s) Oral three times a day  ferrous    sulfate 325 milliGRAM(s) Oral daily  folic acid 1 milliGRAM(s) Oral daily  multivitamin 1 Tablet(s) Oral daily  oxyCODONE    IR 5 milliGRAM(s) Oral every 4 hours PRN  pantoprazole    Tablet 40 milliGRAM(s) Oral before breakfast  polyethylene glycol 3350 17 Gram(s) Oral daily  senna 2 Tablet(s) Oral at bedtime PRN      FAMILY HISTORY:  No pertinent family history in first degree relatives      SOCIAL HISTORY:  Smoking: [ ]Yes [x ]No  ETOH: [ ]Yes [ x]No  Drug Use: [ ]Yes [x ]No   [ x] Single[ ]    T(F): 99.3 (18 @ 14:44), Max: 103.5 (18 @ 19:48)  HR: 79 (18 @ 14:44)  BP: 128/52 (18 @ 14:44)  RR: 18 (18 @ 14:44)  SpO2: 97% (18 @ 14:44)  Wt(kg): --    PHYSICAL EXAM:  General: awake, in some  acute distress  Eyes:  anicteric, no conjunctival injection, no discharge  Oropharynx: no lesions or injection 	  Neck: supple, without adenopathy  Lungs: right basilar rales to auscultation  Heart: s1s2 ppm ok  Abdomen: soft, nondistended, nontender, without mass or organomegaly, very obese  Skin: no lesions  Extremities: right medial thigh some ecchymosis. right knee wound with large eschar  Neurologic: awake, oriented, moves all extremities    LAB RESULTS:                        8.0    13.3  )-----------( 207      ( 2018 10:38 )             25.1     04-09    131<L>  |  96  |  16  ----------------------------<  119<H>  3.8   |  24  |  0.84    Ca    8.0<L>      2018 06:38  Phos  2.8       Mg     1.6         TPro  7.2  /  Alb  1.6<L>  /  TBili  0.3  /  DBili  x   /  AST  34  /  ALT  19  /  AlkPhos  153<H>      LIVER FUNCTIONS - ( 2018 19:56 )  Alb: 1.6 g/dL / Pro: 7.2 g/dL / ALK PHOS: 153 U/L / ALT: 19 U/L RC / AST: 34 U/L / GGT: x           Urinalysis Basic - ( 2018 19:56 )    Color: Yellow / Appearance: SL Turbid / S.024 / pH: x  Gluc: x / Ketone: Negative  / Bili: Negative / Urobili: Negative   Blood: x / Protein: 100 mg/dL / Nitrite: Negative   Leuk Esterase: Moderate / RBC: 3-5 /HPF / WBC 11-25 /HPF   Sq Epi: x / Non Sq Epi: OCC /HPF / Bacteria: Few /HPF        MICROBIOLOGY:  RECENT CULTURES:   @ 21:28 .Urine CL CATCH MDSTRM     No growth       @ 21:26 .Blood CENTRAL LINE     No growth to date.            RADIOLOGY REVIEWED:      < from: CT Lower Extremity No Cont, Bilateral (18 @ 22:31) >  IMPRESSION:     Nonspecific stranding in thevisualized right groin, thigh, knee and   proximal leg superficial soft tissue, which may represent edema.   Recommend clinical correlation to assess cellulitis.     Post surgical changes in bilateral knees as described. Small right > left   knee joint effusion with increased attenuation, which is difficult to   assess due to artifact. Recommend clinical question to assess underlying   infection/septic arthritis.    Additional findings as described.            < end of copied text >      Impression:  Patient with infected right tkr s/p rudy, spacer for strept infection, had poor wound healing so returned 3 months after and had spacer exchange and complex wound closure with mrsa infection found. She is about 2 weeks into dapto and returns with fever, leukocytosis, ongoing pain and eschar of right knee wound and what appears to be a hematoma of the right thigh. INR has been up so that is good reason for the thigh findings. Must consider infected hematoma possible. UTI is possible. Pneumonia is possible given basilar rales. retroperitoneal hematoma possible .  PMR decompensation or adrenal insufficiency a consideration at well.       Recommendations:  continue daptomycin and aztreonam  follow up cultures  check ct chest  do abdomen to to r/o retroperitoneal hematoma above the pelvis area  check am cortisol level   rheumatology evaluation given very high esr  plastics follow up  ortho follows  monitor for rash or possible drug fever.  r/w dr duckworth

## 2018-04-09 NOTE — PROGRESS NOTE ADULT - ASSESSMENT
A/P 77F recently discharged to rehab after undergoing replacement of antibiotic spacer to right knee with complex wound closure (3/23), presenting to [facility] with fevers  - Patient meets sepsis criteria, unlikely that source is right knee based on clinical exam  - Recommend continuing with local wound care to knee: daily dressing changes with xeroform, ABD pads, ACE wrap, and knee immobilizer  - care per medicine/ortho    [id number]

## 2018-04-09 NOTE — H&P ADULT - HISTORY OF PRESENT ILLNESS
Review of history and patient recent course:  78yo f PMH including HLD, GERD, Anxiety, Anemia, CAD s/p HERBERT, severe AS (s/p TAVR & PPM 12/17 [employer] Model [id number]), h/o MVR, PMR on chronic steroids, asthma, OA, s/p TKR with recent joint infection s/p explant and abx spacer on 12/23/17, + rehab when had RLE DVT (dvt proph was held 2nd nose bleed)  on Coumadins/p  3/23/2018 Right knee explantation of previously placed articulating antibiotic spacer, irrigation and debridement of the knee, placement of static antibiotic spacer, with a Plastic Surgery soft tissue complex wound closure.   per ID: complete 6 weeks of iv daptomycin (end 5/5) and then prolonged po keflex and minocycline, local wound care per plastics monitor CMP, CBC with differential and CPK weekly.   Orthopedics consulted: continue antibiotics as per ID and wound treatment as per plastics. Ice, elevate 50% PWD. Pt currently no knee ROM.   Pt was consulted by Cardiology for short episode of Vtach: Pt started on low dose Beta blocker and tolerated. Coumadin for DVT. Monitor INR. Review of history and patient recent course:  78yo f PMH including HLD, GERD, Anxiety, Anemia, CAD s/p HERBERT, severe AS (s/p TAVR & PPM 12/17 [place] Model [id number]), h/o MVR, PMR on chronic steroids, asthma, OA, s/p TKR with recent joint infection s/p explant and abx spacer on 12/23/17, + rehab when had RLE DVT (dvt proph was held 2nd nose bleed)  on Coumadins/p  3/23/2018 Right knee explantation of previously placed articulating antibiotic spacer, irrigation and debridement of the knee, placement of static antibiotic spacer, with a Plastic Surgery soft tissue complex wound closure.   per ID: complete 6 weeks of iv daptomycin (end 5/5) and then prolonged po keflex and minocycline, local wound care per plastics monitor CMP, CBC with differential and CPK weekly.   Orthopedics consulted: continue antibiotics as per ID and wound treatment as per plastics. Ice, elevate 50% PWD. Pt currently no knee ROM.   Pt was consulted by Cardiology for short episode of Vtach: Pt started on low dose Beta blocker and tolerated. Coumadin for DVT. Monitor INR.  HIstory via patient and aide at bedside.  Originally at rehab pt was developing worsening weakness.  Overall poor appetite.  Developed fever/chills yesterday and taken to ED for further work up.  Seen by ortho and plastics in ED with fever to 103.5.  Given vanco/aztreonam, tylenol and oxycodone 5mg x 1.

## 2018-04-10 DIAGNOSIS — G47.33 OBSTRUCTIVE SLEEP APNEA (ADULT) (PEDIATRIC): ICD-10-CM

## 2018-04-10 DIAGNOSIS — R06.02 SHORTNESS OF BREATH: ICD-10-CM

## 2018-04-10 DIAGNOSIS — J45.909 UNSPECIFIED ASTHMA, UNCOMPLICATED: ICD-10-CM

## 2018-04-10 DIAGNOSIS — I35.0 NONRHEUMATIC AORTIC (VALVE) STENOSIS: ICD-10-CM

## 2018-04-10 DIAGNOSIS — E66.9 OBESITY, UNSPECIFIED: ICD-10-CM

## 2018-04-10 LAB
APTT 50/50 2HOUR INCUB: 38.4 SEC — HIGH (ref 27.5–37.4)
APTT BLD: 33.5 SEC — SIGNIFICANT CHANGE UP (ref 27.5–37.4)
APTT BLD: 66 SEC — HIGH (ref 27.5–37.4)
APTT BLD: 67.7 SEC — HIGH (ref 27.5–37.4)
BLD GP AB SCN SERPL QL: POSITIVE — SIGNIFICANT CHANGE UP
CORTIS AM PEAK SERPL-MCNC: 14.4 UG/DL — SIGNIFICANT CHANGE UP (ref 6–18.4)
FIBRINOGEN PPP-MCNC: 657 MG/DL — HIGH (ref 310–510)
FIBRINOGEN PPP-MCNC: 683 MG/DL — HIGH (ref 310–510)
HCT VFR BLD CALC: 23.1 % — LOW (ref 34.5–45)
HCT VFR BLD CALC: 25.5 % — LOW (ref 34.5–45)
HGB BLD-MCNC: 7.5 G/DL — LOW (ref 11.5–15.5)
HGB BLD-MCNC: 7.9 G/DL — LOW (ref 11.5–15.5)
INR BLD: 7.03 RATIO — CRITICAL HIGH (ref 0.88–1.16)
INR BLD: 8.4 RATIO — CRITICAL HIGH (ref 0.88–1.16)
MCHC RBC-ENTMCNC: 27.2 PG — SIGNIFICANT CHANGE UP (ref 27–34)
MCHC RBC-ENTMCNC: 28.6 PG — SIGNIFICANT CHANGE UP (ref 27–34)
MCHC RBC-ENTMCNC: 31 GM/DL — LOW (ref 32–36)
MCHC RBC-ENTMCNC: 32.6 GM/DL — SIGNIFICANT CHANGE UP (ref 32–36)
MCV RBC AUTO: 87.8 FL — SIGNIFICANT CHANGE UP (ref 80–100)
MCV RBC AUTO: 87.9 FL — SIGNIFICANT CHANGE UP (ref 80–100)
PAT CTL 2H: 40 SEC — HIGH (ref 27.5–37.4)
PLATELET # BLD AUTO: 188 K/UL — SIGNIFICANT CHANGE UP (ref 150–400)
PLATELET # BLD AUTO: 224 K/UL — SIGNIFICANT CHANGE UP (ref 150–400)
PROTHROM AB SERPL-ACNC: 82.7 SEC — HIGH (ref 10–13.1)
PROTHROM AB SERPL-ACNC: 95.6 SEC — HIGH (ref 9.8–12.7)
PT 100%: 97.9 SEC — HIGH (ref 10–13.1)
PT 50/50: 13.8 SEC — SIGNIFICANT CHANGE UP (ref 9.7–15.2)
PT 50/50: 14.1 SEC — SIGNIFICANT CHANGE UP (ref 10–15.2)
RBC # BLD: 2.63 M/UL — LOW (ref 3.8–5.2)
RBC # BLD: 2.9 M/UL — LOW (ref 3.8–5.2)
RBC # FLD: 15.6 % — HIGH (ref 10.3–14.5)
RBC # FLD: 16.7 % — HIGH (ref 10.3–14.5)
RH IG SCN BLD-IMP: POSITIVE — SIGNIFICANT CHANGE UP
THROMBIN TIME: 23.1 SECS — SIGNIFICANT CHANGE UP (ref 16–25)
WBC # BLD: 12.23 K/UL — HIGH (ref 3.8–10.5)
WBC # BLD: 13.5 K/UL — HIGH (ref 3.8–10.5)
WBC # FLD AUTO: 12.23 K/UL — HIGH (ref 3.8–10.5)
WBC # FLD AUTO: 13.5 K/UL — HIGH (ref 3.8–10.5)

## 2018-04-10 PROCEDURE — 99232 SBSQ HOSP IP/OBS MODERATE 35: CPT

## 2018-04-10 PROCEDURE — 99222 1ST HOSP IP/OBS MODERATE 55: CPT

## 2018-04-10 PROCEDURE — 99222 1ST HOSP IP/OBS MODERATE 55: CPT | Mod: GC

## 2018-04-10 RX ORDER — ACETAMINOPHEN 500 MG
650 TABLET ORAL EVERY 6 HOURS
Qty: 0 | Refills: 0 | Status: DISCONTINUED | OUTPATIENT
Start: 2018-04-10 | End: 2018-06-06

## 2018-04-10 RX ORDER — PHYTONADIONE (VIT K1) 5 MG
10 TABLET ORAL ONCE
Qty: 0 | Refills: 0 | Status: COMPLETED | OUTPATIENT
Start: 2018-04-10 | End: 2018-04-10

## 2018-04-10 RX ORDER — VANCOMYCIN HCL 1 G
1000 VIAL (EA) INTRAVENOUS EVERY 12 HOURS
Qty: 0 | Refills: 0 | Status: COMPLETED | OUTPATIENT
Start: 2018-04-10 | End: 2018-04-14

## 2018-04-10 RX ORDER — PHYTONADIONE (VIT K1) 5 MG
5 TABLET ORAL ONCE
Qty: 0 | Refills: 0 | Status: COMPLETED | OUTPATIENT
Start: 2018-04-10 | End: 2018-04-10

## 2018-04-10 RX ORDER — TIOTROPIUM BROMIDE 18 UG/1
1 CAPSULE ORAL; RESPIRATORY (INHALATION) DAILY
Qty: 0 | Refills: 0 | Status: DISCONTINUED | OUTPATIENT
Start: 2018-04-10 | End: 2018-06-06

## 2018-04-10 RX ADMIN — Medication 100 MILLIGRAM(S): at 21:55

## 2018-04-10 RX ADMIN — Medication 100 MILLIGRAM(S): at 14:13

## 2018-04-10 RX ADMIN — Medication 81 MILLIGRAM(S): at 14:13

## 2018-04-10 RX ADMIN — Medication 1 TABLET(S): at 21:55

## 2018-04-10 RX ADMIN — DAPTOMYCIN 136 MILLIGRAM(S): 500 INJECTION, POWDER, LYOPHILIZED, FOR SOLUTION INTRAVENOUS at 09:39

## 2018-04-10 RX ADMIN — PANTOPRAZOLE SODIUM 40 MILLIGRAM(S): 20 TABLET, DELAYED RELEASE ORAL at 06:51

## 2018-04-10 RX ADMIN — Medication 1 TABLET(S): at 14:13

## 2018-04-10 RX ADMIN — Medication 102 MILLIGRAM(S): at 22:25

## 2018-04-10 RX ADMIN — Medication 1 MILLIGRAM(S): at 14:13

## 2018-04-10 RX ADMIN — OXYCODONE HYDROCHLORIDE 5 MILLIGRAM(S): 5 TABLET ORAL at 08:29

## 2018-04-10 RX ADMIN — Medication 5 MILLIGRAM(S): at 16:05

## 2018-04-10 RX ADMIN — BUDESONIDE AND FORMOTEROL FUMARATE DIHYDRATE 2 PUFF(S): 160; 4.5 AEROSOL RESPIRATORY (INHALATION) at 17:34

## 2018-04-10 RX ADMIN — Medication 500 MILLIGRAM(S): at 14:13

## 2018-04-10 RX ADMIN — Medication 100 MILLIGRAM(S): at 08:23

## 2018-04-10 RX ADMIN — Medication 250 MILLIGRAM(S): at 18:19

## 2018-04-10 RX ADMIN — TIOTROPIUM BROMIDE 1 CAPSULE(S): 18 CAPSULE ORAL; RESPIRATORY (INHALATION) at 14:13

## 2018-04-10 RX ADMIN — OXYCODONE HYDROCHLORIDE 5 MILLIGRAM(S): 5 TABLET ORAL at 08:59

## 2018-04-10 RX ADMIN — Medication 325 MILLIGRAM(S): at 14:13

## 2018-04-10 RX ADMIN — Medication 100 MILLIGRAM(S): at 06:45

## 2018-04-10 RX ADMIN — Medication 1 TABLET(S): at 06:45

## 2018-04-10 RX ADMIN — POLYETHYLENE GLYCOL 3350 17 GRAM(S): 17 POWDER, FOR SOLUTION ORAL at 14:12

## 2018-04-10 RX ADMIN — Medication 100 MILLIGRAM(S): at 16:05

## 2018-04-10 RX ADMIN — BUDESONIDE AND FORMOTEROL FUMARATE DIHYDRATE 2 PUFF(S): 160; 4.5 AEROSOL RESPIRATORY (INHALATION) at 06:45

## 2018-04-10 RX ADMIN — Medication 650 MILLIGRAM(S): at 18:19

## 2018-04-10 NOTE — PROGRESS NOTE ADULT - SUBJECTIVE AND OBJECTIVE BOX
Patient is a 77y old  Female who presents with a chief complaint of fever (09 Apr 2018 01:13)  pt seen and examined at bedside   SUBJECTIVE/OVERNIGHT events   Vital Signs Last 24 Hrs  T(C): 37.2 (10 Apr 2018 10:00), Max: 38.6 (09 Apr 2018 21:56)  T(F): 98.9 (10 Apr 2018 10:00), Max: 101.4 (09 Apr 2018 21:56)  HR: 98 (10 Apr 2018 10:00) (79 - 104)  BP: 129/66 (10 Apr 2018 10:00) (114/65 - 129/66)  BP(mean): --  RR: 20 (10 Apr 2018 10:00) (18 - 20)  SpO2: 94% (10 Apr 2018 10:00) (93% - 97%)  CAPILLARY BLOOD GLUCOSE        MEDICATIONS  (STANDING):  ascorbic acid 500 milliGRAM(s) Oral daily  aspirin enteric coated 81 milliGRAM(s) Oral daily  aztreonam  IVPB 2000 milliGRAM(s) IV Intermittent every 8 hours  buDESOnide 160 MICROgram(s)/formoterol 4.5 MICROgram(s) Inhaler 2 Puff(s) Inhalation two times a day  calcium carbonate 1250 mG + Vitamin D (OsCal 500 + D) 1 Tablet(s) Oral three times a day  DAPTOmycin IVPB 900 milliGRAM(s) IV Intermittent every 24 hours  docusate sodium 100 milliGRAM(s) Oral three times a day  ferrous    sulfate 325 milliGRAM(s) Oral daily  folic acid 1 milliGRAM(s) Oral daily  multivitamin 1 Tablet(s) Oral daily  pantoprazole    Tablet 40 milliGRAM(s) Oral before breakfast  polyethylene glycol 3350 17 Gram(s) Oral daily  tiotropium 18 MICROgram(s) Capsule 1 Capsule(s) Inhalation daily    MEDICATIONS  (PRN):  acetaminophen   Tablet. 650 milliGRAM(s) Oral every 6 hours PRN Moderate Pain (4 - 6)  oxyCODONE    IR 5 milliGRAM(s) Oral every 4 hours PRN Severe Pain (7 - 10)  senna 2 Tablet(s) Oral at bedtime PRN Constipation    PHYSICAL EXAM  General : comfortable, not in any acute distress  Neck : supple, no LAD, no JVD  Eyes :  no icterus, no pallor  MM moist, no pharyngeal erythema/exudates  Pulmonary: clear to auscultation bilateral lung fields, no crackles/rhonchi/wheezing,   CVS : S1 S2,rate normal-,rhythm-regular, no murmurs, no abnormal sounds  Gastrointestinal: soft, non tender, obese, bs+  Extremities: rt LE brace- medial thigh swollen, tender, erythematous, eschar-blister  edema Lt LE  Neuro: AAOx2-3, no focal deficits    LABS                        7.9    12.23 )-----------( 188      ( 10 Apr 2018 07:45 )             25.5     04-09    131<L>  |  96  |  16  ----------------------------<  119<H>  3.8   |  24  |  0.84    Ca    8.0<L>      09 Apr 2018 06:38  Phos  2.8     04-09  Mg     1.6     04-09    TPro  7.2  /  Alb  1.6<L>  /  TBili  0.3  /  DBili  x   /  AST  34  /  ALT  19  /  AlkPhos  153<H>  04-08      Culture - Blood (collected 08 Apr 2018 21:46)  Source: .Blood Blood-Peripheral  Preliminary Report (09 Apr 2018 22:01):    No growth to date.    Culture - Blood (collected 08 Apr 2018 21:46)  Source: .Blood Blood-Peripheral  Preliminary Report (09 Apr 2018 22:01):    No growth to date.    Culture - Urine (collected 08 Apr 2018 21:44)  Source: .Urine Catheterized  Final Report (09 Apr 2018 21:15):    <10,000 CFU/ml    Normal Urogenital roopa present      RADIOLOGY and IMAGING reviewed: yes ct< from: CT Abdomen and Pelvis No Cont (04.09.18 @ 22:31) >  Multifocal pneumonia.    No acute intra-abdominal pathology.    No evidence of retroperitoneal hematoma. Small subcutaneous hematoma   within the left lower anterior abdominal wall.    < end of copied text >    Consultant notes reviewed : yes  Care discussed with Consultants/other providers : Patient is a 77y old  Female who presents with a chief complaint of fever (09 Apr 2018 01:13)  pt seen and examined at bedside   SUBJECTIVE/OVERNIGHT events noted, febrile 101 last night  c/o pain in the medial thigh area    Vital Signs Last 24 Hrs  T(C): 37.2 (10 Apr 2018 10:00), Max: 38.6 (09 Apr 2018 21:56)  T(F): 98.9 (10 Apr 2018 10:00), Max: 101.4 (09 Apr 2018 21:56)  HR: 98 (10 Apr 2018 10:00) (79 - 104)  BP: 129/66 (10 Apr 2018 10:00) (114/65 - 129/66)  BP(mean): --  RR: 20 (10 Apr 2018 10:00) (18 - 20)  SpO2: 94% (10 Apr 2018 10:00) (93% - 97%)  CAPILLARY BLOOD GLUCOSE        MEDICATIONS  (STANDING):  ascorbic acid 500 milliGRAM(s) Oral daily  aspirin enteric coated 81 milliGRAM(s) Oral daily  aztreonam  IVPB 2000 milliGRAM(s) IV Intermittent every 8 hours  buDESOnide 160 MICROgram(s)/formoterol 4.5 MICROgram(s) Inhaler 2 Puff(s) Inhalation two times a day  calcium carbonate 1250 mG + Vitamin D (OsCal 500 + D) 1 Tablet(s) Oral three times a day  DAPTOmycin IVPB 900 milliGRAM(s) IV Intermittent every 24 hours  docusate sodium 100 milliGRAM(s) Oral three times a day  ferrous    sulfate 325 milliGRAM(s) Oral daily  folic acid 1 milliGRAM(s) Oral daily  multivitamin 1 Tablet(s) Oral daily  pantoprazole    Tablet 40 milliGRAM(s) Oral before breakfast  polyethylene glycol 3350 17 Gram(s) Oral daily  tiotropium 18 MICROgram(s) Capsule 1 Capsule(s) Inhalation daily    MEDICATIONS  (PRN):  acetaminophen   Tablet. 650 milliGRAM(s) Oral every 6 hours PRN Moderate Pain (4 - 6)  oxyCODONE    IR 5 milliGRAM(s) Oral every 4 hours PRN Severe Pain (7 - 10)  senna 2 Tablet(s) Oral at bedtime PRN Constipation    PHYSICAL EXAM  General : comfortable, not in any acute distress  Neck : supple, no LAD, no JVD  Eyes :  no icterus, no pallor  MM moist, no pharyngeal erythema/exudates  Pulmonary: clear to auscultation bilateral lung fields, no crackles/rhonchi/wheezing,   CVS : S1 S2,rate normal-,rhythm-regular, no murmurs, no abnormal sounds  Gastrointestinal: soft, non tender, obese, bs+  Extremities: rt LE brace- medial thigh swollen, tender, erythematous, eschar-blister  edema Lt LE  Neuro: AAOx2-3, no focal deficits    LABS                        7.9    12.23 )-----------( 188      ( 10 Apr 2018 07:45 )             25.5     04-09    131<L>  |  96  |  16  ----------------------------<  119<H>  3.8   |  24  |  0.84    Ca    8.0<L>      09 Apr 2018 06:38  Phos  2.8     04-09  Mg     1.6     04-09    TPro  7.2  /  Alb  1.6<L>  /  TBili  0.3  /  DBili  x   /  AST  34  /  ALT  19  /  AlkPhos  153<H>  04-08      Culture - Blood (collected 08 Apr 2018 21:46)  Source: .Blood Blood-Peripheral  Preliminary Report (09 Apr 2018 22:01):    No growth to date.    Culture - Blood (collected 08 Apr 2018 21:46)  Source: .Blood Blood-Peripheral  Preliminary Report (09 Apr 2018 22:01):    No growth to date.    Culture - Urine (collected 08 Apr 2018 21:44)  Source: .Urine Catheterized  Final Report (09 Apr 2018 21:15):    <10,000 CFU/ml    Normal Urogenital roopa present      RADIOLOGY and IMAGING reviewed: yes ct< from: CT Abdomen and Pelvis No Cont (04.09.18 @ 22:31) >  Multifocal pneumonia.    No acute intra-abdominal pathology.    No evidence of retroperitoneal hematoma. Small subcutaneous hematoma   within the left lower anterior abdominal wall.    < end of copied text >    Consultant notes reviewed : yes  Care discussed with Consultants/other providers :

## 2018-04-10 NOTE — CONSULT NOTE ADULT - ATTENDING COMMENTS
78 yo female with PMH as noted, recently admitted with infected knee jt required spacer placement post TKR, now on broad spectrum abx and poor nutritional intake. Pt has been on chronic coumadin treatment for cardiac etiology (s/p TAVR and MVR) and prior DVT. Heme team consulted for markedly elevated INR  - INR elevated secondary to coumadin toxicity in setting of poor nutrition and possible vit K def as well as med effects  - recc monitor closely for bleeding  - give vit K 10mg IV x1  - repeat vit K dosing prn for INR >5  - hold coumadin  - give FFP 2-4 units if active bleeding  - check DIC panel, fibrinogen and trend coags - concern of sepsis.

## 2018-04-10 NOTE — CONSULT NOTE ADULT - SUBJECTIVE AND OBJECTIVE BOX
Patient is a 77y female with tavr, aicd, remote bilateral tkr who developed strept bacteremia and seeding of the right tkr in December after dental work. ERIKA showed no veg. She had rudy and spacer and extended iv ceftriaxone then some po abx. She returned to hospital last month with poorly healing right knee wound so she had rudy, new spacer and plastic closure of the wound and this time grew mrsa. She has been on daptomycin for one specimen with high vanco elier and some transient kasia. She continues to have an eschar over the knee followed by plastics. She developed an area of purple red on the right thigh above the brace. She went has been maintained on monitored bed for some vt. she went to rehab 4 days ago and now returns with fever, higher wbc, very weak and lots of pain in the right leg. General surgery consulted to rule out right thigh infected hematoma.      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  Artificial cardiac pacemaker: 12/25/17  H/O cardiac catheterization: 11/29/16 HERBERT placed on LAD  H/O endoscopy: with dilatation of esophageal stricture 2013  S/P cataract surgery: x2; 3-4yr ago  S/P gastroplasty: 28 yrs ago  S/P cholecystectomy: more than 15 years ago  S/P total knee replacement: left, 15yr ago  S/P total knee replacement: right, 12yr ago  S/P hysterectomy: 24yr ago    ICU Vital Signs Last 24 Hrs  T(C): 37.2 (10 Apr 2018 00:09), Max: 38.6 (09 Apr 2018 21:56)  T(F): 98.9 (10 Apr 2018 00:09), Max: 101.4 (09 Apr 2018 21:56)  HR: 97 (10 Apr 2018 00:09) (79 - 104)  BP: 114/65 (10 Apr 2018 00:09) (114/65 - 130/55)  BP(mean): --  ABP: --  ABP(mean): --  RR: 18 (10 Apr 2018 00:09) (18 - 18)  SpO2: 96% (10 Apr 2018 00:09) (93% - 100%)    General: awake, in some  acute distress  Eyes:  anicteric, no conjunctival injection, no discharge  Oropharynx: no lesions or injection 	  Neck: supple, without adenopathy  Lungs: right basilar rales to auscultation  Heart: s1s2 ppm ok  Abdomen: soft, nondistended, nontender, without mass or organomegaly, very obese  Skin: no lesions  Extremities: right medial thigh some ecchymosis. right knee wound with large eschar, boggy, no active drainage   Neurologic: awake, oriented, moves all extremities                          8.0    13.3  )-----------( 207      ( 09 Apr 2018 10:38 )             25.1   04-09    131<L>  |  96  |  16  ----------------------------<  119<H>  3.8   |  24  |  0.84    Ca    8.0<L>      09 Apr 2018 06:38  Phos  2.8     04-09  Mg     1.6     04-09    TPro  7.2  /  Alb  1.6<L>  /  TBili  0.3  /  DBili  x   /  AST  34  /  ALT  19  /  AlkPhos  153<H>  04-08    < from: CT Pelvis No Cont (04.08.18 @ 22:14) >  PROCEDURE:   CT of the Pelvis and bilateral lower extremity was performed without   intravenous contrast.   Intravenous contrast: None.  Oral contrast: None.  Sagittal and coronal reformats were performed.    FINDINGS: Evaluation of the pelvic organs, viscera and vasculature is   limited without intravenous contrast.     BLADDER: Partially distended.  REPRODUCTIVE ORGANS: Status post hysterectomy. No adnexal mass.  LYMPH NODES: No pelvic lymphadenopathy.    VISUALIZED PORTIONS:  ABDOMINAL ORGANS: Within normal limits.  BOWEL: No bowel obstruction. Borderline, fluid-filled mid appendix (0.6   cm) and unremarkable proximal portion and tip, without significant   inflammatory change.  PERITONEUM: No free air.  VESSELS: Atherosclerotic change of the distal abdominal aorta and its   branches.    ABDOMINAL WALL/SOFT TISSUES: Left lateral buttock and lower extremity   soft tissue not included in the study. Small high attenuation densities   along the visualized pelvic wall (measuring up to 3.5 x 1.6 cm on the   left), likely subcutaneous hematomas, presumably injection-related.   Nonspecific stranding in the visualized right groin, thigh, knee and   proximal leg superficial soft tissue. Post surgical changes in bilateral   knee superficial soft tissue. Skin staples over the anteromedial right   distal thigh/knee/proximal leg soft tissue. Small right> left knee joint   effusion with increased attenuation, which is difficult to assess due to   artifact. Diffuse muscle bulk loss with fatty replacement.    BONES: Degenerative changes of the spine. Stable grade 1 anterolisthesis   of L4 on L5.  Again noted are postsurgical change from the prior right   knee arthrodesis (intramedullary ruthann/cement spacer placement) and left   total knee arthroplasty. The proximal portion of the intramedullary ruthann   abutting the posterior distal femoral cortex. Minimal lucency at the   bone-cement interface in the distal femur.    IMPRESSION:     Nonspecific stranding in thevisualized right groin, thigh, knee and   proximal leg superficial soft tissue, which may represent edema.   Recommend clinical correlation to assess cellulitis.     Post surgical changes in bilateral knees as described. Small right > left   knee joint effusion with increased attenuation, which is difficult to   assess due to artifact. Recommend clinical question to assess underlying   infection/septic arthritis.    < end of copied text >

## 2018-04-10 NOTE — CONSULT NOTE ADULT - ASSESSMENT
Patient with infected right tkr s/p rudy, spacer for strept infection, had poor wound healing so returned 3 months after and had spacer exchange and complex wound closure with mrsa infection found. She is about 2 weeks into dapto and returns with fever, leukocytosis, ongoing pain and eschar of right knee wound   -On exam, patient has echymosis with underlying fullness consistent with hematoma  -there is no erythema, no cellulitis or spontaneous drainage  -No evidence of underlying infection  -There is an area of necrosis around the staple line with underlying boggyness concerning for wound breakdown/soft tissue necrosis   -No general surgery intervention indicated at this time  -Continue care as per primary team

## 2018-04-10 NOTE — CONSULT NOTE ADULT - ATTENDING COMMENTS
as above-  Pulm relatively stable-atelectasis, prior PE, asthma, cardiac Dz  Inhaler regimen as outlined above  DVT rx /prophylaxis  ID follow up of ABX; PT evaluation and Psych evaluation    Everett Kumar MD-Pulmonary   [phone]

## 2018-04-10 NOTE — CONSULT NOTE ADULT - PROBLEM SELECTOR RECOMMENDATION 2
symbicort 160 2 bid  spiriva 1 puff q day  pulmonary toilet-incentive spirometry, Chest Pt-acapella/chest vest-up to 5 times per day.    maintain oxygen levels above 90%-supplemental oxygen via nasal canula-humidified    All nebulized therapy is to be administered via hand held nebulizer-(patient must gargle and spit with water after use)

## 2018-04-10 NOTE — CHART NOTE - NSCHARTNOTEFT_GEN_A_CORE
Informed by RN pt with elevated INR from AM lab;  no bleeding reported; coumadin has been on hold fro the past 2 days;  Patient is a 77y old  Female who presents with a chief complaint of fever (09 Apr 2018 01:13)      Vital Signs Last 24 Hrs  T(C): 37.2 (10 Apr 2018 10:00), Max: 38.6 (09 Apr 2018 21:56)  T(F): 98.9 (10 Apr 2018 10:00), Max: 101.4 (09 Apr 2018 21:56)  HR: 98 (10 Apr 2018 10:00) (79 - 104)  BP: 129/66 (10 Apr 2018 10:00) (114/65 - 129/66)  BP(mean): --  RR: 20 (10 Apr 2018 10:00) (18 - 20)  SpO2: 94% (10 Apr 2018 10:00) (93% - 97%)                        7.9    12.23 )-----------( 188      ( 10 Apr 2018 07:45 )             25.5     04-09    131<L>  |  96  |  16  ----------------------------<  119<H>  3.8   |  24  |  0.84    Ca    8.0<L>      09 Apr 2018 06:38  Phos  2.8     04-09  Mg     1.6     04-09    TPro  7.2  /  Alb  1.6<L>  /  TBili  0.3  /  DBili  x   /  AST  34  /  ALT  19  /  AlkPhos  153<H>  04-08    PT/INR - ( 10 Apr 2018 09:40 )   PT: 82.7 sec;   INR: 7.03 ratio         PTT - ( 10 Apr 2018 09:40 )  PTT:66.0 sec      Neurology: A&Ox2 nonfocal,  Head:  Normocephalic, atraumatic  Respiratory: CTA B/L  CV: RRR, S1S2,   Abdominal: Soft, NT, bowel sounds present  Ext- RLE in brace, edema present  A/P    76yo f PMH including HLD, GERD, Anxiety, Anemia, CAD s/p HERBERT, severe AS (s/p TAVR & PPM 12/17 [employer] [id number]), h/o MVR, PMR on chronic steroids, asthma, OA, s/p TKR with recent joint infection s/p explant and abx spacer on 12/23/17, + rehab when had RLE DVT (dvt proph was held 2nd nose bleed)  on Coumadins/p  3/23/2018 Right knee explantation of previously placed articulating antibiotic spacer, irrigation and debridement of the knee, placement of static antibiotic spacer, with a Plastic Surgery soft tissue complex wound closure.   plan is complete 6 weeks of iv daptomycin (end 5/5) and then prolonged po keflex and minocycline, local wound care per plastics   now with Fever/sepsis- source likely infected wound medial thigh  vs ct chest : multifocal pna    Recent septic Rt TKR- abx spacer exchange and complex wound closure   on Dapto and Aztreonam,   pt with supra therapeutic INR; coumadin is on hold for the past 2 days; continue to hold coumadin;  no bleeding reported; monitor CBC/INR; per d/w Md, OLAYINKA dimer, fibrinogen assay -hematology consult;

## 2018-04-10 NOTE — CONSULT NOTE ADULT - ATTENDING COMMENTS
Agree with above. Recommend plastic surgery reevaluation of incision. While cannot r/o infected thigh hematoma at this time, likelihood appears lower compared with SSI.

## 2018-04-10 NOTE — PROGRESS NOTE ADULT - ATTENDING COMMENTS
I agree with the above note and have personally seen and examined this patient. All pertinent films have been reviewed. Please refer to clinical documentation of the history, physical examinations, data summary, and both assessment and plan as documented above and with which I agree.    no new event, more interactive today  pain controlled    Physical Exam  NAD, interactive  RLE:  Dressing and navarro in place, removed for inspection  area of eschar at medial distal aspect of wound, no purulence or drainage  medial groin/thigh erythema near area where tourniquet was applied, not fluctuant, firm  +ehl/fhl/ta/gs function  SILT L4-S1  Dp/pt pulse intact  Compartments soft    77y Female admitted for sepsis, h/o R TKA PJI w recent placement of new abx spacer and I&D  Appreciate medicine team management of this medically complex patient  Wound care per plastics team, Dr. Mg  ID team recommending azetreonam/daptomycin  appreciate rheum consult  Pain control  DVT ppx, coumadin, goal INR 2-3, however now supratherapeutic, has filter in place, hematology rec vit K, poor po intake worsening status of INR  PT/OT, oob to chair if able  50% PWB in brace, no knee flexion allowed, may remove brace while in bed  Appreciate gen surg recs, consider u/s hematoma    Nate Bass MD  Attending Orthopedic Surgeon

## 2018-04-10 NOTE — CONSULT NOTE ADULT - SUBJECTIVE AND OBJECTIVE BOX
HPI:  Review of history and patient recent course:  76yo f PMH including HLD, GERD, Anxiety, Anemia, CAD s/p HERBERT, severe AS (s/p TAVR & PPM 12/17 [employer] Model [id number]), h/o MVR, PMR on chronic steroids, asthma, OA, s/p TKR with recent joint infection s/p explant and abx spacer on 12/23/17, + rehab when had RLE DVT (dvt proph was held 2nd nose bleed)  on Coumadins/p  3/23/2018 Right knee explantation of previously placed articulating antibiotic spacer, irrigation and debridement of the knee, placement of static antibiotic spacer, with a Plastic Surgery soft tissue complex wound closure.   per ID: complete 6 weeks of iv daptomycin (end 5/5) and then prolonged po keflex and minocycline, local wound care per plastics monitor CMP, CBC with differential and CPK weekly.   Orthopedics consulted: continue antibiotics as per ID and wound treatment as per plastics. Ice, elevate 50% PWD. Pt currently no knee ROM.   Pt was consulted by Cardiology for short episode of Vtach: Pt started on low dose Beta blocker and tolerated. Coumadin for DVT. Monitor INR.  HIstory via patient and aide at bedside.  Originally at rehab pt was developing worsening weakness.  Overall poor appetite.  Developed fever/chills yesterday and taken to ED for further work up.  Seen by ortho and plastics in ED with fever to 103.5.  Given vanco/aztreonam, tylenol and oxycodone 5mg x 1. (09 Apr 2018 01:13)      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  Artificial cardiac pacemaker: 12/25/17  H/O cardiac catheterization: 11/29/16 HERBERT placed on LAD  H/O endoscopy: with dilatation of esophageal stricture 2013  S/P cataract surgery: x2; 3-4yr ago  S/P gastroplasty: 28 yrs ago  S/P cholecystectomy: more than 15 years ago  S/P total knee replacement: left, 15yr ago  S/P total knee replacement: right, 12yr ago  S/P hysterectomy: 24yr ago      Allergies    amoxicillin (Other)    Intolerances    IV Contrast (Flushing (Skin); Nausea)      MEDICATIONS  (STANDING):  ascorbic acid 500 milliGRAM(s) Oral daily  aspirin enteric coated 81 milliGRAM(s) Oral daily  aztreonam  IVPB 2000 milliGRAM(s) IV Intermittent every 8 hours  buDESOnide 160 MICROgram(s)/formoterol 4.5 MICROgram(s) Inhaler 2 Puff(s) Inhalation two times a day  calcium carbonate 1250 mG + Vitamin D (OsCal 500 + D) 1 Tablet(s) Oral three times a day  docusate sodium 100 milliGRAM(s) Oral three times a day  ferrous    sulfate 325 milliGRAM(s) Oral daily  folic acid 1 milliGRAM(s) Oral daily  multivitamin 1 Tablet(s) Oral daily  pantoprazole    Tablet 40 milliGRAM(s) Oral before breakfast  polyethylene glycol 3350 17 Gram(s) Oral daily  tiotropium 18 MICROgram(s) Capsule 1 Capsule(s) Inhalation daily  vancomycin  IVPB 1000 milliGRAM(s) IV Intermittent every 12 hours    MEDICATIONS  (PRN):  acetaminophen   Tablet 650 milliGRAM(s) Oral every 6 hours PRN For Temp greater than 38 C (100.4 F)  acetaminophen   Tablet. 650 milliGRAM(s) Oral every 6 hours PRN Moderate Pain (4 - 6)  oxyCODONE    IR 5 milliGRAM(s) Oral every 4 hours PRN Severe Pain (7 - 10)  senna 2 Tablet(s) Oral at bedtime PRN Constipation      FAMILY HISTORY:  No pertinent family history in first degree relatives      SOCIAL HISTORY: No EtOH, no tobacco    REVIEW OF SYSTEMS:    CONSTITUTIONAL: No weakness, fevers or chills  EYES/ENT: No visual changes;  No vertigo or throat pain   NECK: No pain or stiffness  RESPIRATORY: No cough, wheezing, hemoptysis; No shortness of breath  CARDIOVASCULAR: No chest pain or palpitations  GASTROINTESTINAL: No abdominal or epigastric pain. No nausea, vomiting, or hematemesis; No diarrhea or constipation. No melena or hematochezia.  GENITOURINARY: No dysuria, frequency or hematuria  NEUROLOGICAL: No numbness or weakness  SKIN: No itching, burning, rashes, or lesions   All other review of systems is negative unless indicated above.        T(F): 100.6 (04-10-18 @ 17:39), Max: 101.4 (04-09-18 @ 21:56)  HR: 105 (04-10-18 @ 17:39)  BP: 109/64 (04-10-18 @ 17:39)  RR: 17 (04-10-18 @ 17:39)  SpO2: 93% (04-10-18 @ 17:39)  Wt(kg): --    GENERAL: NAD, well-developed  HEAD:  Atraumatic, Normocephalic  EYES: EOMI, PERRLA, conjunctiva and sclera clear  NECK: Supple, No JVD  CHEST/LUNG: Clear to auscultation bilaterally; No wheeze  HEART: Regular rate and rhythm; No murmurs, rubs, or gallops  ABDOMEN: Soft, Nontender, Nondistended; Bowel sounds present  EXTREMITIES:  2+ Peripheral Pulses, No clubbing, cyanosis, or edema  NEUROLOGY: non-focal  SKIN: No rashes or lesions                          7.9    12.23 )-----------( 188      ( 10 Apr 2018 07:45 )             25.5       04-09    131<L>  |  96  |  16  ----------------------------<  119<H>  3.8   |  24  |  0.84    Ca    8.0<L>      09 Apr 2018 06:38  Phos  2.8     04-09  Mg     1.6     04-09    TPro  7.2  /  Alb  1.6<L>  /  TBili  0.3  /  DBili  x   /  AST  34  /  ALT  19  /  AlkPhos  153<H>  04-08          PT/INR - ( 10 Apr 2018 16:52 )   PT: 95.6 sec;   INR: 8.40 ratio         PTT - ( 10 Apr 2018 09:40 )  PTT:66.0 sec    .Blood Blood-Peripheral  04-08 @ 21:46   No growth to date.  --  --      .Urine Catheterized  04-08 @ 21:44   <10,000 CFU/ml  Normal Urogenital roopa present  --  --      .Urine CL CATCH MDSTRM  04-05 @ 21:28   No growth  --  --      .Blood CENTRAL LINE  04-05 @ 21:26   No growth to date.  --  --      .Blood Blood-Peripheral  03-27 @ 02:11   No growth at 5 days.  --  --      .Urine Catheterized  03-25 @ 11:58   No growth  --  --      .Blood Blood  03-24 @ 05:48   No growth at 5 days.  --  --      .Body Fluid Synovial Fluid-- rt knee  03-24 @ 03:55   Rare Methicillin resistant Staphylococcus aureus  --  Methicillin resistant Staphylococcus aureus      .Tissue Other, synovium #1  03-24 @ 03:53   Rare Methicillin resistant Staphylococcus aureus  --  Methicillin resistant Staphylococcus aureus      .Tissue Other, femoral canal  03-24 @ 03:51   Rare Methicillin resistant Staphylococcus aureus  --  Methicillin resistant Staphylococcus aureus      .Tissue Other, synovium #2  03-24 @ 03:41   Growth in fluid media only Methicillin resistant Staphylococcus aureus  --  Methicillin resistant Staphylococcus aureus      .Tissue Other, tibial canal  03-24 @ 03:40   No growth at 5 days  --    No polymorphonuclear cells seen per low power field  No organisms seen per oil power field      .Blood Blood-Peripheral  01-23 @ 23:08   No growth at 5 days.  --  --      .Urine Clean Catch (Midstream)  01-23 @ 16:09   No growth  --  --      .Blood Blood-Peripheral  01-23 @ 14:49   No growth at 5 days.  --  -- HPI:  Review of history and patient recent course:  76yo f PMH including HLD, GERD, Anxiety, Anemia, CAD s/p HERBERT, severe AS (s/p TAVR & PPM 12/17 [place] [employer] Model [id number]), h/o MVR, PMR on chronic steroids, asthma, OA, s/p TKR with recent joint infection s/p explant and abx spacer on 12/23/17, + rehab , RLE DVT (dvt proph was held 2nd nose bleed)  on Coumadin presented with weakness from rehab.       per ID: complete 6 weeks of iv daptomycin (end 5/5) and then prolonged po keflex and minocycline, local wound care per plastics monitor CMP, CBC with differential and CPK weekly.   Orthopedics consulted: continue antibiotics as per ID and wound treatment as per plastics. Ice, elevate 50% PWD. Pt currently no knee ROM.   Pt was consulted by Cardiology for short episode of Vtach: Pt started on low dose Beta blocker and tolerated. Coumadin for DVT. Monitor INR.  HIstory via patient and aide at bedside.  Originally at rehab pt was developing worsening weakness.  Overall poor appetite.  Developed fever/chills yesterday and taken to ED for further work up.  Seen by ortho and plastics in ED with fever to 103.5.  Given vanco/aztreonam, tylenol and oxycodone 5mg x 1. (09 Apr 2018 01:13)      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  Artificial cardiac pacemaker: 12/25/17  H/O cardiac catheterization: 11/29/16 HERBERT placed on LAD  H/O endoscopy: with dilatation of esophageal stricture 2013  S/P cataract surgery: x2; 3-4yr ago  S/P gastroplasty: 28 yrs ago  S/P cholecystectomy: more than 15 years ago  S/P total knee replacement: left, 15yr ago  S/P total knee replacement: right, 12yr ago  S/P hysterectomy: 24yr ago      Allergies    amoxicillin (Other)    Intolerances    IV Contrast (Flushing (Skin); Nausea)      MEDICATIONS  (STANDING):  ascorbic acid 500 milliGRAM(s) Oral daily  aspirin enteric coated 81 milliGRAM(s) Oral daily  aztreonam  IVPB 2000 milliGRAM(s) IV Intermittent every 8 hours  buDESOnide 160 MICROgram(s)/formoterol 4.5 MICROgram(s) Inhaler 2 Puff(s) Inhalation two times a day  calcium carbonate 1250 mG + Vitamin D (OsCal 500 + D) 1 Tablet(s) Oral three times a day  docusate sodium 100 milliGRAM(s) Oral three times a day  ferrous    sulfate 325 milliGRAM(s) Oral daily  folic acid 1 milliGRAM(s) Oral daily  multivitamin 1 Tablet(s) Oral daily  pantoprazole    Tablet 40 milliGRAM(s) Oral before breakfast  polyethylene glycol 3350 17 Gram(s) Oral daily  tiotropium 18 MICROgram(s) Capsule 1 Capsule(s) Inhalation daily  vancomycin  IVPB 1000 milliGRAM(s) IV Intermittent every 12 hours    MEDICATIONS  (PRN):  acetaminophen   Tablet 650 milliGRAM(s) Oral every 6 hours PRN For Temp greater than 38 C (100.4 F)  acetaminophen   Tablet. 650 milliGRAM(s) Oral every 6 hours PRN Moderate Pain (4 - 6)  oxyCODONE    IR 5 milliGRAM(s) Oral every 4 hours PRN Severe Pain (7 - 10)  senna 2 Tablet(s) Oral at bedtime PRN Constipation      FAMILY HISTORY:  No pertinent family history in first degree relatives      SOCIAL HISTORY: No EtOH, no tobacco    REVIEW OF SYSTEMS:    CONSTITUTIONAL: No weakness, fevers or chills  EYES/ENT: No visual changes;  No vertigo or throat pain   NECK: No pain or stiffness  RESPIRATORY: No cough, wheezing, hemoptysis; No shortness of breath  CARDIOVASCULAR: No chest pain or palpitations  GASTROINTESTINAL: No abdominal or epigastric pain. No nausea, vomiting, or hematemesis; No diarrhea or constipation. No melena or hematochezia.  GENITOURINARY: No dysuria, frequency or hematuria  NEUROLOGICAL: No numbness or weakness  SKIN: No itching, burning, rashes, or lesions   All other review of systems is negative unless indicated above.        T(F): 100.6 (04-10-18 @ 17:39), Max: 101.4 (04-09-18 @ 21:56)  HR: 105 (04-10-18 @ 17:39)  BP: 109/64 (04-10-18 @ 17:39)  RR: 17 (04-10-18 @ 17:39)  SpO2: 93% (04-10-18 @ 17:39)  Wt(kg): --    GENERAL: NAD, well-developed  HEAD:  Atraumatic, Normocephalic  EYES: EOMI, PERRLA, conjunctiva and sclera clear  NECK: Supple, No JVD  CHEST/LUNG: Clear to auscultation bilaterally; No wheeze  HEART: Regular rate and rhythm; No murmurs, rubs, or gallops  ABDOMEN: Soft, Nontender, Nondistended; Bowel sounds present  EXTREMITIES:  2+ Peripheral Pulses, No clubbing, cyanosis, or edema  NEUROLOGY: non-focal  SKIN: No rashes or lesions                          7.9    12.23 )-----------( 188      ( 10 Apr 2018 07:45 )             25.5       04-09    131<L>  |  96  |  16  ----------------------------<  119<H>  3.8   |  24  |  0.84    Ca    8.0<L>      09 Apr 2018 06:38  Phos  2.8     04-09  Mg     1.6     04-09    TPro  7.2  /  Alb  1.6<L>  /  TBili  0.3  /  DBili  x   /  AST  34  /  ALT  19  /  AlkPhos  153<H>  04-08          PT/INR - ( 10 Apr 2018 16:52 )   PT: 95.6 sec;   INR: 8.40 ratio         PTT - ( 10 Apr 2018 09:40 )  PTT:66.0 sec    .Blood Blood-Peripheral  04-08 @ 21:46   No growth to date.  --  --      .Urine Catheterized  04-08 @ 21:44   <10,000 CFU/ml  Normal Urogenital roopa present  --  --      .Urine CL CATCH MDSTRM  04-05 @ 21:28   No growth  --  --      .Blood CENTRAL LINE  04-05 @ 21:26   No growth to date.  --  --      .Blood Blood-Peripheral  03-27 @ 02:11   No growth at 5 days.  --  --      .Urine Catheterized  03-25 @ 11:58   No growth  --  --      .Blood Blood  03-24 @ 05:48   No growth at 5 days.  --  --      .Body Fluid Synovial Fluid-- rt knee  03-24 @ 03:55   Rare Methicillin resistant Staphylococcus aureus  --  Methicillin resistant Staphylococcus aureus      .Tissue Other, synovium #1  03-24 @ 03:53   Rare Methicillin resistant Staphylococcus aureus  --  Methicillin resistant Staphylococcus aureus      .Tissue Other, femoral canal  03-24 @ 03:51   Rare Methicillin resistant Staphylococcus aureus  --  Methicillin resistant Staphylococcus aureus      .Tissue Other, synovium #2  03-24 @ 03:41   Growth in fluid media only Methicillin resistant Staphylococcus aureus  --  Methicillin resistant Staphylococcus aureus      .Tissue Other, tibial canal  03-24 @ 03:40   No growth at 5 days  --    No polymorphonuclear cells seen per low power field  No organisms seen per oil power field      .Blood Blood-Peripheral  01-23 @ 23:08   No growth at 5 days.  --  --      .Urine Clean Catch (Midstream)  01-23 @ 16:09   No growth  --  --      .Blood Blood-Peripheral  01-23 @ 14:49   No growth at 5 days.  --  --

## 2018-04-10 NOTE — CONSULT NOTE ADULT - SUBJECTIVE AND OBJECTIVE BOX
HPI:Patient is a 77y female with tavr, aicd, remote bilateral tkr who developed strept bacteremia and seeding of the right tkr in December after dental work. ERIKA showed no veg. She had rudy and spacer and extended iv ceftriaxone then some po abx. She returned to hospital last month with poorly healing right knee wound so she had rudy, new spacer and plastic closure of the wound and this time grew mrsa. She has been on daptomycin for one specimen with high vanco elier and some transient kasia. She continues to have an eschar over the knee followed by plastics. She developed an area of purple red on the right thigh above the brace. She went has been maintained on monitored bed for some vt. she went to rehab 4 days ago and now returns with fever, higher wbc, very weak and lots of pain in the right leg.         PAST MEDICAL & SURGICAL HISTORY:  CAD (coronary artery disease): HERBERT to LAD 2016  Aortic stenosis, severe  Uncomplicated asthma, unspecified asthma severity  Polymyalgia  Lumbar disc disease with radiculopathy  Spider veins  Anxiety  Severe aortic stenosis by prior echocardiogram:  and  2016  Dysphagia  Macular degeneration  Hiatal hernia  GERD (gastroesophageal reflux disease)  Sciatica  Osteoarthritis  S/P TKR (total knee replacement): joint infection s/p explant and abx spacer on 17  S/P TAVR (transcatheter aortic valve replacement): 17  Artificial cardiac pacemaker: 17  H/O cardiac catheterization: 16 HERBERT placed on LAD  H/O endoscopy: with dilatation of esophageal stricture   S/P cataract surgery: x2; 3-4yr ago  S/P gastroplasty: 28 yrs ago  S/P cholecystectomy: more than 15 years ago  S/P total knee replacement: left, 15yr ago  S/P total knee replacement: right, 12yr ago  S/P hysterectomy: 24yr ago      FAMILY HISTORY:  No pertinent family history in first degree relatives      SOCIAL HISTORY:  Smoking: non  EtOH Use: soc.  Marital Status: m  Occupation: retired  Exposures: none  Recent Travel: none    Allergies    amoxicillin (Other)    Intolerances    IV Contrast (Flushing (Skin); Nausea)      HOME MEDICATIONS:    REVIEW OF SYSTEMS:  Constitutional: No fevers or chills. No weight loss. No fatigue or generalised malaise.  Eyes: No itching or discharge from the eyes  ENT: No ear pain. No ear discharge. No nasal congestion. No post nasal drip. No epistaxis. No throat pain. No sore throat. No difficulty swallowing.   CV: No chest pain. No palpitations. No lightheadedness or dizziness.   Resp: No dyspnea at rest. No dyspnea on exertion. No orthopnea. No wheezing. No cough. No stridor. No sputum production. No chest pain with respiration.  GI: No nausea. No vomiting. No diarrhea.  MSK: No joint pain or pain in any extremities  Integumentary: No skin lesions. No pedal edema.  Neurological: No gross motor weakness. No sensory changes.    [ ] All other systems negative  [ ] Unable to assess ROS because ________    OBJECTIVE:  ICU Vital Signs Last 24 Hrs  T(C): 37.2 (10 Apr 2018 00:09), Max: 38.6 (2018 21:56)  T(F): 98.9 (10 Apr 2018 00:09), Max: 101.4 (2018 21:56)  HR: 97 (10 Apr 2018 00:09) (79 - 104)  BP: 114/65 (10 Apr 2018 00:09) (114/65 - 130/55)  BP(mean): --  ABP: --  ABP(mean): --  RR: 18 (10 Apr 2018 00:09) (18 - 18)  SpO2: 96% (10 Apr 2018 00:09) (93% - 100%)         @ 07:01  -  04-10 @ 05:49  --------------------------------------------------------  IN: 120 mL / OUT: 100 mL / NET: 20 mL      CAPILLARY BLOOD GLUCOSE          PHYSICAL EXAM:  General: Awake, alert, oriented X 3.   HEENT: Atraumatic, normocephalic.                 Mallampatti Grade                 No nasal congestion.                No tonsillar or pharyngeal exudates.  Lymph Nodes: No palpable lymphadenopathy  Neck: No JVD. No carotid bruit.   Respiratory: Normal chest expansion                         Normal percussion                         Normal and equal air entry                         No wheeze, rhonchi or rales.  Cardiovascular: S1 S2 normal. No murmurs, rubs or gallops.   Abdomen: Soft, non-tender, non-distended. No organomegaly.  Extremities: Warm to touch. Peripheral pulse palpable. No pedal edema.   Skin: No rashes or skin lesions  Neurological: Motor and sensory examination equal and normal in all four extremities.  Psychiatry: Appropriate mood and affect.    HOSPITAL MEDICATIONS:  MEDICATIONS  (STANDING):  ascorbic acid 500 milliGRAM(s) Oral daily  aspirin enteric coated 81 milliGRAM(s) Oral daily  aztreonam  IVPB 2000 milliGRAM(s) IV Intermittent every 8 hours  buDESOnide 160 MICROgram(s)/formoterol 4.5 MICROgram(s) Inhaler 2 Puff(s) Inhalation two times a day  calcium carbonate 1250 mG + Vitamin D (OsCal 500 + D) 1 Tablet(s) Oral three times a day  DAPTOmycin IVPB 900 milliGRAM(s) IV Intermittent every 24 hours  docusate sodium 100 milliGRAM(s) Oral three times a day  ferrous    sulfate 325 milliGRAM(s) Oral daily  folic acid 1 milliGRAM(s) Oral daily  multivitamin 1 Tablet(s) Oral daily  pantoprazole    Tablet 40 milliGRAM(s) Oral before breakfast  polyethylene glycol 3350 17 Gram(s) Oral daily    MEDICATIONS  (PRN):  acetaminophen   Tablet. 650 milliGRAM(s) Oral every 6 hours PRN Moderate Pain (4 - 6)  oxyCODONE    IR 5 milliGRAM(s) Oral every 4 hours PRN Severe Pain (7 - 10)  senna 2 Tablet(s) Oral at bedtime PRN Constipation      LABS:                        8.0    13.3  )-----------( 207      ( 2018 10:38 )             25.1         131<L>  |  96  |  16  ----------------------------<  119<H>  3.8   |  24  |  0.84    Ca    8.0<L>      2018 06:38  Phos  2.8     -  Mg     1.6     -    TPro  7.2  /  Alb  1.6<L>  /  TBili  0.3  /  DBili  x   /  AST  34  /  ALT  19  /  AlkPhos  153<H>  -08    PT/INR - ( 2018 10:38 )   PT: 70.0 sec;   INR: 6.24 ratio         PTT - ( 2018 10:38 )  PTT:88.6 sec  Urinalysis Basic - ( 2018 19:56 )    Color: Yellow / Appearance: SL Turbid / S.024 / pH: x  Gluc: x / Ketone: Negative  / Bili: Negative / Urobili: Negative   Blood: x / Protein: 100 mg/dL / Nitrite: Negative   Leuk Esterase: Moderate / RBC: 3-5 /HPF / WBC 11-25 /HPF   Sq Epi: x / Non Sq Epi: OCC /HPF / Bacteria: Few /HPF        Venous Blood Gas:   @ 19:56  7.37/48/56//  VBG Lactate: 2.2      MICROBIOLOGY:     RADIOLOGY:  [ ] Reviewed and interpreted by me    Point of Care Ultrasound Findings;    PFT:    EKG: HPI:Patient is a 77y female with tavr, aicd, remote bilateral tkr who developed strept bacteremia and seeding of the right tkr in December after dental work. ERIKA showed no veg. She had rudy and spacer and extended iv ceftriaxone then some po abx. She returned to hospital last month with poorly healing right knee wound so she had rudy, new spacer and plastic closure of the wound and this time grew mrsa. She has been on daptomycin for one specimen with high vanco elier and some transient kasia. She continues to have an eschar over the knee followed by plastics. She developed an area of purple red on the right thigh above the brace. She went has been maintained on monitored bed for some vt. she went to rehab 4 days ago and now returns with fever, higher wbc, very weak and lots of pain in the right leg. Sob related ot pain--no cough or HB or sour taste in mouth, poor appetite        PAST MEDICAL & SURGICAL HISTORY:  CAD (coronary artery disease): HERBERT to LAD 2016  Aortic stenosis, severe  Uncomplicated asthma, unspecified asthma severity  Polymyalgia  Lumbar disc disease with radiculopathy  Spider veins  Anxiety  Severe aortic stenosis by prior echocardiogram:  and  2016  Dysphagia  Macular degeneration  Hiatal hernia  GERD (gastroesophageal reflux disease)  Sciatica  Osteoarthritis  S/P TKR (total knee replacement): joint infection s/p explant and abx spacer on 17  S/P TAVR (transcatheter aortic valve replacement): 17  Artificial cardiac pacemaker: 17  H/O cardiac catheterization: 16 HERBERT placed on LAD  H/O endoscopy: with dilatation of esophageal stricture   S/P cataract surgery: x2; 3-4yr ago  S/P gastroplasty: 28 yrs ago  S/P cholecystectomy: more than 15 years ago  S/P total knee replacement: left, 15yr ago  S/P total knee replacement: right, 12yr ago  S/P hysterectomy: 24yr ago      FAMILY HISTORY:  No pertinent family history in first degree relatives      SOCIAL HISTORY:  Smoking: non  EtOH Use: soc.  Marital Status: m  Occupation: retired  Exposures: none  Recent Travel: none    Allergies    amoxicillin (Other)    Intolerances    IV Contrast (Flushing (Skin); Nausea)      HOME MEDICATIONS:    REVIEW OF SYSTEMS:  Constitutional: + fevers or chills. No weight loss. + fatigue or generalised malaise.  Eyes: No itching or discharge from the eyes  ENT: No ear pain. No ear discharge. No nasal congestion. No post nasal drip. No epistaxis. No throat pain. No sore throat. No difficulty swallowing.   CV: No chest pain. No palpitations. No lightheadedness or dizziness.   Resp: No dyspnea at rest. + dyspnea on exertion. No orthopnea. No wheezing. No cough. No stridor. No sputum production. No chest pain with respiration.  GI: No nausea. No vomiting. No diarrhea.  MSK: No joint pain or pain in any extremities except right leg  Integumentary: No skin lesions. No pedal edema.  Neurological: No gross motor weakness. No sensory changes.    [+ ] All other systems negative  [ ] Unable to assess ROS because ________    OBJECTIVE:  ICU Vital Signs Last 24 Hrs  T(C): 37.2 (10 Apr 2018 00:09), Max: 38.6 (2018 21:56)  T(F): 98.9 (10 Apr 2018 00:09), Max: 101.4 (2018 21:56)  HR: 97 (10 Apr 2018 00:09) (79 - 104)  BP: 114/65 (10 Apr 2018 00:09) (114/65 - 130/55)  BP(mean): --  ABP: --  ABP(mean): --  RR: 18 (10 Apr 2018 00:09) (18 - 18)  SpO2: 96% (10 Apr 2018 00:09) (93% - 100%)        04-09 @ 07:01  -  04-10 @ 05:49  --------------------------------------------------------  IN: 120 mL / OUT: 100 mL / NET: 20 mL      CAPILLARY BLOOD GLUCOSE          PHYSICAL EXAM: emotional over all-on NC O2  General: Awake, alert, oriented X 3.   HEENT: Atraumatic, normocephalic.                 Mallampatti Grade 3                No nasal congestion.                No tonsillar or pharyngeal exudates.  Lymph Nodes: No palpable lymphadenopathy  Neck: No JVD. No carotid bruit.   Respiratory: abnormal chest expansion-reduced                         Normal percussion                         Normal and equal air entry                         No wheeze, rhonchi or rales.  Cardiovascular: S1 S2 normal. No murmurs, rubs or gallops.   Abdomen: Soft, non-tender, non-distended. No organomegaly.  Extremities: Warm to touch. Peripheral pulse palpable. No pedal edema. Right leg wrapped  Skin: No rashes or skin lesions  Neurological: Motor and sensory examination equal and normal in all four extremities.  Psychiatry: Appropriate mood and affect.    HOSPITAL MEDICATIONS:  MEDICATIONS  (STANDING):  ascorbic acid 500 milliGRAM(s) Oral daily  aspirin enteric coated 81 milliGRAM(s) Oral daily  aztreonam  IVPB 2000 milliGRAM(s) IV Intermittent every 8 hours  buDESOnide 160 MICROgram(s)/formoterol 4.5 MICROgram(s) Inhaler 2 Puff(s) Inhalation two times a day  calcium carbonate 1250 mG + Vitamin D (OsCal 500 + D) 1 Tablet(s) Oral three times a day  DAPTOmycin IVPB 900 milliGRAM(s) IV Intermittent every 24 hours  docusate sodium 100 milliGRAM(s) Oral three times a day  ferrous    sulfate 325 milliGRAM(s) Oral daily  folic acid 1 milliGRAM(s) Oral daily  multivitamin 1 Tablet(s) Oral daily  pantoprazole    Tablet 40 milliGRAM(s) Oral before breakfast  polyethylene glycol 3350 17 Gram(s) Oral daily    MEDICATIONS  (PRN):  acetaminophen   Tablet. 650 milliGRAM(s) Oral every 6 hours PRN Moderate Pain (4 - 6)  oxyCODONE    IR 5 milliGRAM(s) Oral every 4 hours PRN Severe Pain (7 - 10)  senna 2 Tablet(s) Oral at bedtime PRN Constipation      LABS:                        8.0    13.3  )-----------( 207      ( 2018 10:38 )             25.1     -    131<L>  |  96  |  16  ----------------------------<  119<H>  3.8   |  24  |  0.84    Ca    8.0<L>      2018 06:38  Phos  2.8     -  Mg     1.6     -    TPro  7.2  /  Alb  1.6<L>  /  TBili  0.3  /  DBili  x   /  AST  34  /  ALT  19  /  AlkPhos  153<H>      PT/INR - ( 2018 10:38 )   PT: 70.0 sec;   INR: 6.24 ratio         PTT - ( 2018 10:38 )  PTT:88.6 sec  Urinalysis Basic - ( 2018 19:56 )    Color: Yellow / Appearance: SL Turbid / S.024 / pH: x  Gluc: x / Ketone: Negative  / Bili: Negative / Urobili: Negative   Blood: x / Protein: 100 mg/dL / Nitrite: Negative   Leuk Esterase: Moderate / RBC: 3-5 /HPF / WBC 11-25 /HPF   Sq Epi: x / Non Sq Epi: OCC /HPF / Bacteria: Few /HPF        Venous Blood Gas:   @ 19:56  7.37/48/56/27/87  VBG Lactate: 2.2      MICROBIOLOGY:     RADIOLOGY:  < from: Xray Chest 1 View AP/PA (18 @ 21:43) >  NTERPRETATION:  CLINICAL INFORMATION: Shortness of breath    TECHNIQUE: AP view of the chest.    COMPARISON: Chest x-ray 2018    FINDINGS:     Partially visualized left-sided pacemaker leads.  The lungs are clear. No pneumothorax.  The cardiomediastinal silhouette is unremarkable.  The visualized osseous structures are unremarkable for age.    IMPRESSION:   Clear lungs.    < end of copied text >    [ ] Reviewed and interpreted by me    Point of Care Ultrasound Findings;    PFT:    EKG:

## 2018-04-10 NOTE — PROGRESS NOTE ADULT - SUBJECTIVE AND OBJECTIVE BOX
CC: f/u for pneumonia and mrsa septic prosthetic knee    Patient reports she doesn't know how she feels just some pain in the thigh    REVIEW OF SYSTEMS:  All other review of systems negative (Comprehensive ROS)    Antimicrobials Day #  : total  aztreonam  IVPB 2000 milliGRAM(s) IV Intermittent every 8 hours day 2  vancomycin  IVPB 1000 milliGRAM(s) IV Intermittent every 12 hours    Other Medications Reviewed    T(F): 100.6 (04-10-18 @ 17:39), Max: 101.4 (18 @ 21:56)  HR: 105 (04-10-18 @ 17:39)  BP: 109/64 (04-10-18 @ 17:39)  RR: 17 (04-10-18 @ 17:39)  SpO2: 93% (04-10-18 @ 17:39)  Wt(kg): --    PHYSICAL EXAM:  General: alert, no acute distress  Eyes:  anicteric, no conjunctival injection, no discharge  Oropharynx: no lesions or injection 	  Neck: supple, without adenopathy  Lungs: course to auscultation  Heart: regular rate and rhythm; no murmur, rubs or gallops  Abdomen: soft, nondistended, nontender, without mass or organomegaly  Skin: no lesions  Extremities: right thigh ecchymosis, soft, no crepitus, refuses knee exam  Neurologic: alert, oriented, moves all extremities    LAB RESULTS:                        7.9    12.23 )-----------( 188      ( 10 Apr 2018 07:45 )             25.5     -    131<L>  |  96  |  16  ----------------------------<  119<H>  3.8   |  24  |  0.84    Ca    8.0<L>      2018 06:38  Phos  2.8     04-  Mg     1.6     -    TPro  7.2  /  Alb  1.6<L>  /  TBili  0.3  /  DBili  x   /  AST  34  /  ALT  19  /  AlkPhos  153<H>  -08    LIVER FUNCTIONS - ( 2018 19:56 )  Alb: 1.6 g/dL / Pro: 7.2 g/dL / ALK PHOS: 153 U/L / ALT: 19 U/L RC / AST: 34 U/L / GGT: x           Urinalysis Basic - ( 2018 19:56 )    Color: Yellow / Appearance: SL Turbid / S.024 / pH: x  Gluc: x / Ketone: Negative  / Bili: Negative / Urobili: Negative   Blood: x / Protein: 100 mg/dL / Nitrite: Negative   Leuk Esterase: Moderate / RBC: 3-5 /HPF / WBC 11-25 /HPF   Sq Epi: x / Non Sq Epi: OCC /HPF / Bacteria: Few /HPF      MICROBIOLOGY:  RECENT CULTURES:   @ 21:46 .Blood Blood-Peripheral     No growth to date.       @ 21:44 .Urine Catheterized     <10,000 CFU/ml  Normal Urogenital roopa present       @ 21:28 .Urine CL CATCH MDSTRM     No growth       @ 21:26 .Blood CENTRAL LINE     No growth to date.          RADIOLOGY REVIEWED:    < from: CT Chest No Cont (18 @ 22:31) >  IMPRESSION:     Multifocal pneumonia.    No acute intra-abdominal pathology.    No evidence of retroperitoneal hematoma. Small subcutaneous hematoma   within the left lower anterior abdominal wall.      < end of copied text >    Assessment:  Patient with prosthetic right knee infection s/p rudy , spacer, prolonged abx in dec and returns 2.5 weeks ago with poorly healing wound over knee so had spacer exchange, antibiotics coated ruthann and plastic closure of wound with growth of mrsa. still has skin necrosis of wound.  She was sent to rehab 5 days ago on iv dapto and returns with fever, leukocytosis and found to have multifocal pneumonia. Changed dapto to vanco for lung treatment. To continue aztreonam. Surgery input re thigh hematoma appreciated.   Plan:  continue tx for pneumonia and knee with vanco and aztreonam  plastics to decide if and when  any debridement of wound or flap  monitor thigh hematoma  follow up blood cultures  favor rheum eval for pmr

## 2018-04-10 NOTE — CHART NOTE - NSCHARTNOTEFT_GEN_A_CORE
Medicine NP : Supra therapeutic INR.    cont to hold coumadin-likely nutritional vit k deffi /sepsis.  no bleeding reported; coumadin has been on hold fro the past 2 days.    Patient with prosthetic right knee infection s/p rudy , spacer, prolonged abx in dec and returns 2.5 weeks ago with poorly healing wound over knee so had spacer exchange, antibiotics coated ruthann and plastic closure of wound with growth of mrsa. still has skin necrosis of wound.  She was sent to rehab 5 days ago on iv dapto and returns with fever, leukocytosis and found to have multifocal pneumonia. Changed dapto to vanco for lung treatment. To continue aztreonam.    A/P : Received Vit K 5 mg PO x1 today.  Notified Dr. Murphy ,asked to notify hematology .   As per hematology : monitor closely   If any signs of bleeding needs reversal with Vit K and FFP.   Repeat labs in am .

## 2018-04-10 NOTE — PROGRESS NOTE ADULT - ASSESSMENT
76yo f PMH including HLD, GERD, Anxiety, Anemia, CAD s/p HERBERT, severe AS (s/p TAVR & PPM 12/17 [employer] Model [id number]), h/o MVR, PMR on chronic steroids, asthma, OA, s/p TKR with recent joint infection s/p explant and abx spacer on 12/23/17, + rehab when had RLE DVT (dvt proph was held 2nd nose bleed)  on Coumadins/p  3/23/2018 Right knee explantation of previously placed articulating antibiotic spacer, irrigation and debridement of the knee, placement of static antibiotic spacer, with a Plastic Surgery soft tissue complex wound closure.   per ID: complete 6 weeks of iv daptomycin (end 5/5) and then prolonged po keflex and minocycline, local wound care per plastics     #Fever/sepsis- source likely infected wound medial thigh  vs ct chest : multifocal pna    Recent septic Rt TKR- abx spacer exchange and complex wound closure   PMR on chr steroids    Plan  Cont Dapto and Aztreonam,   bld cx ngtd,   ct c/a/p  reviewed as abovepna-pt asymptomatic  ID c/s appreciated  Rheum c/s given hx of PMR on steroids and pt with fever, elev esr  Surgery , ortho and plastics c/s appreciated f/u recs 78yo f PMH including HLD, GERD, Anxiety, Anemia, CAD s/p HERBERT, severe AS (s/p TAVR & PPM 12/17 [place] Model [id number]), h/o MVR, PMR on chronic steroids, asthma, OA, s/p TKR with recent joint infection s/p explant and abx spacer on 12/23/17, + rehab when had RLE DVT (dvt proph was held 2nd nose bleed)  on Coumadins/p  3/23/2018 Right knee explantation of previously placed articulating antibiotic spacer, irrigation and debridement of the knee, placement of static antibiotic spacer, with a Plastic Surgery soft tissue complex wound closure.   per ID: complete 6 weeks of iv daptomycin (end 5/5) and then prolonged po keflex and minocycline, local wound care per plastics     #Fever/sepsis- source likely infected wound medial thigh  vs ct chest : multifocal pna    Recent septic Rt TKR- abx spacer exchange and complex wound closure   Supratherapeutic INR   PMR on chr steroids    Plan  Cont Dapto and Aztreonam,   bld cx ngtd,   ct c/a/p  reviewed as abovepna-pt asymptomatic  ID c/s appreciated  cont to hold coumadin-likely nutritional vitk deffi/sepsis- heme c/s  Rheum c/s given hx of PMR on steroids and pt with fever, elev esr  Surgery , ortho and plastics c/s appreciated f/u recs 78yo f PMH including HLD, GERD, Anxiety, Anemia, CAD s/p HERBERT, severe AS (s/p TAVR & PPM 12/17 [employer] Model [id number]), h/o MVR, PMR on chronic steroids, asthma, OA, s/p TKR with recent joint infection s/p explant and abx spacer on 12/23/17, + rehab when had RLE DVT (dvt proph was held 2nd nose bleed)  on Coumadins/p  3/23/2018 Right knee explantation of previously placed articulating antibiotic spacer, irrigation and debridement of the knee, placement of static antibiotic spacer, with a Plastic Surgery soft tissue complex wound closure.   per ID: complete 6 weeks of iv daptomycin (end 5/5) and then prolonged po keflex and minocycline, local wound care per plastics     #Fever/sepsis- source likely infected wound/hematoma medial thigh  vs ct chest : multifocal pna    Recent septic Rt TKR- abx spacer exchange and complex wound closure   Supratherapeutic INR/coagulopathy   PMR on chr steroids    Plan  Cont Dapto and Aztreonam,   bld cx ngtd,   ct c/a/p  reviewed as above pna-pt asymptomatic  ID c/s appreciated  cont to hold coumadin-likely nutritional vitk deffi/sepsis- heme c/s  Rheum c/s given hx of PMR on steroids and pt with fever, elev esr  Surgery , ortho and plastics c/s appreciated f/u recs

## 2018-04-10 NOTE — CONSULT NOTE ADULT - ASSESSMENT
Review of history and patient recent course:  78yo f PMH including HLD, GERD, Anxiety, Anemia, CAD s/p HERBERT, severe AS (s/p TAVR & PPM 12/17 [employer] Model [id number]), h/o MVR, PMR on chronic steroids, asthma, OA, s/p TKR with recent joint infection s/p explant and abx spacer on 12/23/17, + rehab , RLE DVT (dvt proph was held 2nd nose bleed)  on Coumadin presented with weakness from rehab.  Hematology consulted for supratherapeutic INR    Supratherapeutic INR- secondary to vitamin k deficiency vs antibiotics vs infection  - mixing studies corrected  - would check DIC labs ( fibrinogen, D dimer) repeat coags daily  -s/p PO 5mg Vitamin K x1. would give 10mg IV vitamin K tonight and repeat PT/INR tomorrow  - on antibiotics, which can cause vitamin k deficiency. Review of history and patient recent course:  76yo f PMH including HLD, GERD, Anxiety, Anemia, CAD s/p HERBERT, severe AS (s/p TAVR & PPM 12/17 [employer] Model [id number]), h/o MVR, PMR on chronic steroids, asthma, OA, s/p TKR with recent joint infection s/p explant and abx spacer on 12/23/17, + rehab , RLE DVT (dvt proph was held 2nd nose bleed)  on Coumadin presented with weakness from rehab.  Hematology consulted for supratherapeutic INR    Supratherapeutic INR- secondary to vitamin k deficiency vs antibiotics vs infection  - mixing studies corrected  - would check DIC labs ( fibrinogen, D dimer) repeat coags daily  -s/p PO 5mg Vitamin K x1. repeat INR 8. would give 10mg IV vitamin K tonight and repeat PT/INR tomorrow  - on antibiotics, which can cause vitamin k deficiency.

## 2018-04-10 NOTE — CONSULT NOTE ADULT - SUBJECTIVE AND OBJECTIVE BOX
HISTORY OF PRESENT ILLNESS:  78 yo female with h/o B TKR; had infection of R implant in December of '17 after dental work; has had multiple surgeries of the knee-had spacer placed and wound debridement.  Recent prolonged course of Abx.  Returns to hospital with increasing weakness and fever, sweating.  She has persistent wound of the R knee; recent increased bruising around the area.  She has a h/o PMR in the past; treated with steroids by Dr. LYUBOV Meeks; tapered off last month.  She has not had further shoulder or limb girdle pain since stopping prednisone.      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  Artificial cardiac pacemaker: 12/25/17  H/O cardiac catheterization: 11/29/16 HERBERT placed on LAD  H/O endoscopy: with dilatation of esophageal stricture 2013  S/P cataract surgery: x2; 3-4yr ago  S/P gastroplasty: 28 yrs ago  S/P cholecystectomy: more than 15 years ago  S/P total knee replacement: left, 15yr ago  S/P total knee replacement: right, 12yr ago  S/P hysterectomy: 24yr ago        MEDICATIONS  (STANDING):  ascorbic acid 500 milliGRAM(s) Oral daily  aspirin enteric coated 81 milliGRAM(s) Oral daily  aztreonam  IVPB 2000 milliGRAM(s) IV Intermittent every 8 hours  buDESOnide 160 MICROgram(s)/formoterol 4.5 MICROgram(s) Inhaler 2 Puff(s) Inhalation two times a day  calcium carbonate 1250 mG + Vitamin D (OsCal 500 + D) 1 Tablet(s) Oral three times a day  docusate sodium 100 milliGRAM(s) Oral three times a day  ferrous    sulfate 325 milliGRAM(s) Oral daily  folic acid 1 milliGRAM(s) Oral daily  multivitamin 1 Tablet(s) Oral daily  pantoprazole    Tablet 40 milliGRAM(s) Oral before breakfast  phytonadione  IVPB 10 milliGRAM(s) IV Intermittent once  polyethylene glycol 3350 17 Gram(s) Oral daily  tiotropium 18 MICROgram(s) Capsule 1 Capsule(s) Inhalation daily  vancomycin  IVPB 1000 milliGRAM(s) IV Intermittent every 12 hours    MEDICATIONS  (PRN):  acetaminophen   Tablet 650 milliGRAM(s) Oral every 6 hours PRN For Temp greater than 38 C (100.4 F)  acetaminophen   Tablet. 650 milliGRAM(s) Oral every 6 hours PRN Moderate Pain (4 - 6)  oxyCODONE    IR 5 milliGRAM(s) Oral every 4 hours PRN Severe Pain (7 - 10)  senna 2 Tablet(s) Oral at bedtime PRN Constipation      Allergies    amoxicillin (Other)    Intolerances    IV Contrast (Flushing (Skin); Nausea)      PERTINENT MEDICATION HISTORY:    SOCIAL HISTORY:    FAMILY HISTORY:  No pertinent family history in first degree relatives      Vital Signs Last 24 Hrs  T(C): 38.1 (10 Apr 2018 17:39), Max: 38.6 (09 Apr 2018 21:56)  T(F): 100.6 (10 Apr 2018 17:39), Max: 101.4 (09 Apr 2018 21:56)  HR: 105 (10 Apr 2018 17:39) (84 - 105)  BP: 109/64 (10 Apr 2018 17:39) (109/64 - 129/66)  BP(mean): --  RR: 17 (10 Apr 2018 17:39) (17 - 20)  SpO2: 93% (10 Apr 2018 17:39) (93% - 96%)    Daily     Daily     PHYSICAL EXAM:      Constitutional:  Fair, +sweatiness    Eyes:    ENMT:    Neck:  supple     Breasts:    Back:    Respiratory:  good AE bilat    Cardiovascular:  s1s2 reg    Gastrointestinal:  abd soft nt, no masses    Genitourinary:    Rectal:    Extremities:  good distal pulses b feet, +edema of L foot, R foot OK    Vascular:    Neurological:    Skin:  +bruising in area of R leg brace, no active rash elsewhere    Lymph Nodes:    Musculoskeletal:  B TKR; R knee fully bandaged and in brace; able to lift legs off stretcher  GOod ROM of b. shoulder; no pain to palpation in joint or muscle areas    Psychiatric:        LABS:                        7.9    12.23 )-----------( 188      ( 10 Apr 2018 07:45 )             25.5     04-09    131<L>  |  96  |  16  ----------------------------<  119<H>  3.8   |  24  |  0.84    Ca    8.0<L>      09 Apr 2018 06:38  Phos  2.8     04-09  Mg     1.6     04-09    C-Reactive Protein, Serum (04.09.18 @ 06:14)    C-Reactive Protein, Serum: 35.50: Test Repeated mg/dL    ESR>140      PT/INR - ( 10 Apr 2018 16:52 )   PT: 95.6 sec;   INR: 8.40 ratio         PTT - ( 10 Apr 2018 09:40 )  PTT:66.0 sec      RADIOLOGY & ADDITIONAL STUDIES:

## 2018-04-10 NOTE — PROGRESS NOTE ADULT - SUBJECTIVE AND OBJECTIVE BOX
No acute events overnight. Pain well controlled with pain medications. INR supratherapeutic, medicine aware.    Vital Signs Last 24 Hrs  T(C): 37.1 (10 Apr 2018 13:56), Max: 38.6 (09 Apr 2018 21:56)  T(F): 98.8 (10 Apr 2018 13:56), Max: 101.4 (09 Apr 2018 21:56)  HR: 92 (10 Apr 2018 13:56) (84 - 104)  BP: 127/72 (10 Apr 2018 13:56) (114/65 - 129/66)  BP(mean): --  RR: 18 (10 Apr 2018 13:56) (18 - 20)  SpO2: 95% (10 Apr 2018 13:56) (93% - 96%)    Exam:  Gen: NAD, erythema and swelling on medial, proximal thigh  Motor: wiggles toes  Sensory: SILT DP/SP/S/S/T nerve distributions  Vascular: 2+ Dorsalis Pedis pulse  Dressing: Clean, Dry, Intact in [place] brace    A/P: 77 year old female s/p R knee stage 1 revision  - Pain Control  - Abx per ID  - Needs hematology consult regarding supratherapeutic INR  - Needs INR reversal  - Follow up Rheumatology  - Follow up CT Chest/Abdomen  - Will continue to monitor

## 2018-04-11 ENCOUNTER — TRANSCRIPTION ENCOUNTER (OUTPATIENT)
Age: 78
End: 2018-04-11

## 2018-04-11 DIAGNOSIS — J18.9 PNEUMONIA, UNSPECIFIED ORGANISM: ICD-10-CM

## 2018-04-11 LAB
ANION GAP SERPL CALC-SCNC: 12 MMOL/L — SIGNIFICANT CHANGE UP (ref 5–17)
ANISOCYTOSIS BLD QL: SLIGHT — SIGNIFICANT CHANGE UP
ANTIBODY ID 1_1: SIGNIFICANT CHANGE UP
ANTIBODY ID 1_2: SIGNIFICANT CHANGE UP
APTT BLD: 86.9 SEC — HIGH (ref 27.5–37.4)
BASOPHILS # BLD AUTO: 0.1 K/UL — SIGNIFICANT CHANGE UP (ref 0–0.2)
BUN SERPL-MCNC: 18 MG/DL — SIGNIFICANT CHANGE UP (ref 7–23)
CALCIUM SERPL-MCNC: 8.5 MG/DL — SIGNIFICANT CHANGE UP (ref 8.4–10.5)
CHLORIDE SERPL-SCNC: 96 MMOL/L — SIGNIFICANT CHANGE UP (ref 96–108)
CO2 SERPL-SCNC: 24 MMOL/L — SIGNIFICANT CHANGE UP (ref 22–31)
CREAT SERPL-MCNC: 0.88 MG/DL — SIGNIFICANT CHANGE UP (ref 0.5–1.3)
D DIMER BLD IA.RAPID-MCNC: 1235 NG/ML DDU — HIGH
DACRYOCYTES BLD QL SMEAR: SLIGHT — SIGNIFICANT CHANGE UP
DAT POLY-SP REAG RBC QL: POSITIVE — SIGNIFICANT CHANGE UP
ELLIPTOCYTES BLD QL SMEAR: SLIGHT — SIGNIFICANT CHANGE UP
ELUATE ANTIBODY 1: SIGNIFICANT CHANGE UP
EOSINOPHIL # BLD AUTO: 0.2 K/UL — SIGNIFICANT CHANGE UP (ref 0–0.5)
EOSINOPHIL NFR BLD AUTO: 1 % — SIGNIFICANT CHANGE UP (ref 0–6)
FIBRINOGEN PPP-MCNC: 1132 MG/DL — HIGH (ref 310–510)
GLUCOSE SERPL-MCNC: 108 MG/DL — HIGH (ref 70–99)
HCT VFR BLD CALC: 23.6 % — LOW (ref 34.5–45)
HCT VFR BLD CALC: 25.1 % — LOW (ref 34.5–45)
HGB BLD-MCNC: 7.6 G/DL — LOW (ref 11.5–15.5)
HGB BLD-MCNC: 7.7 G/DL — LOW (ref 11.5–15.5)
INR BLD: 1.58 RATIO — HIGH (ref 0.88–1.16)
INR BLD: 2.07 RATIO — HIGH (ref 0.88–1.16)
LYMPHOCYTES # BLD AUTO: 1.4 K/UL — SIGNIFICANT CHANGE UP (ref 1–3.3)
LYMPHOCYTES # BLD AUTO: 11 % — LOW (ref 13–44)
MCHC RBC-ENTMCNC: 27.9 PG — SIGNIFICANT CHANGE UP (ref 27–34)
MCHC RBC-ENTMCNC: 28.3 PG — SIGNIFICANT CHANGE UP (ref 27–34)
MCHC RBC-ENTMCNC: 30.7 GM/DL — LOW (ref 32–36)
MCHC RBC-ENTMCNC: 32.1 GM/DL — SIGNIFICANT CHANGE UP (ref 32–36)
MCV RBC AUTO: 88.2 FL — SIGNIFICANT CHANGE UP (ref 80–100)
MCV RBC AUTO: 90.9 FL — SIGNIFICANT CHANGE UP (ref 80–100)
MONOCYTES # BLD AUTO: 0.9 K/UL — SIGNIFICANT CHANGE UP (ref 0–0.9)
MONOCYTES NFR BLD AUTO: 8 % — SIGNIFICANT CHANGE UP (ref 2–14)
NEUTROPHILS # BLD AUTO: 11.1 K/UL — HIGH (ref 1.8–7.4)
NEUTROPHILS NFR BLD AUTO: 79 % — HIGH (ref 43–77)
NEUTS BAND # BLD: 1 % — SIGNIFICANT CHANGE UP (ref 0–8)
OVALOCYTES BLD QL SMEAR: SLIGHT — SIGNIFICANT CHANGE UP
PLAT MORPH BLD: NORMAL — SIGNIFICANT CHANGE UP
PLATELET # BLD AUTO: 239 K/UL — SIGNIFICANT CHANGE UP (ref 150–400)
PLATELET # BLD AUTO: 240 K/UL — SIGNIFICANT CHANGE UP (ref 150–400)
POIKILOCYTOSIS BLD QL AUTO: SLIGHT — SIGNIFICANT CHANGE UP
POLYCHROMASIA BLD QL SMEAR: SLIGHT — SIGNIFICANT CHANGE UP
POTASSIUM SERPL-MCNC: 4.1 MMOL/L — SIGNIFICANT CHANGE UP (ref 3.5–5.3)
POTASSIUM SERPL-SCNC: 4.1 MMOL/L — SIGNIFICANT CHANGE UP (ref 3.5–5.3)
PROTHROM AB SERPL-ACNC: 17.2 SEC — HIGH (ref 9.8–12.7)
PROTHROM AB SERPL-ACNC: 23.7 SEC — HIGH (ref 10–13.1)
RBC # BLD: 2.68 M/UL — LOW (ref 3.8–5.2)
RBC # BLD: 2.76 M/UL — LOW (ref 3.8–5.2)
RBC # FLD: 15.6 % — HIGH (ref 10.3–14.5)
RBC # FLD: 16.9 % — HIGH (ref 10.3–14.5)
RBC BLD AUTO: SIGNIFICANT CHANGE UP
SODIUM SERPL-SCNC: 132 MMOL/L — LOW (ref 135–145)
TARGETS BLD QL SMEAR: SLIGHT — SIGNIFICANT CHANGE UP
WBC # BLD: 12.81 K/UL — HIGH (ref 3.8–10.5)
WBC # BLD: 15.9 K/UL — HIGH (ref 3.8–10.5)
WBC # FLD AUTO: 12.81 K/UL — HIGH (ref 3.8–10.5)
WBC # FLD AUTO: 15.9 K/UL — HIGH (ref 3.8–10.5)

## 2018-04-11 PROCEDURE — 99223 1ST HOSP IP/OBS HIGH 75: CPT

## 2018-04-11 PROCEDURE — 99232 SBSQ HOSP IP/OBS MODERATE 35: CPT

## 2018-04-11 RX ORDER — HEPARIN SODIUM 5000 [USP'U]/ML
9500 INJECTION INTRAVENOUS; SUBCUTANEOUS EVERY 6 HOURS
Qty: 0 | Refills: 0 | Status: DISCONTINUED | OUTPATIENT
Start: 2018-04-11 | End: 2018-04-14

## 2018-04-11 RX ORDER — HEPARIN SODIUM 5000 [USP'U]/ML
4500 INJECTION INTRAVENOUS; SUBCUTANEOUS EVERY 6 HOURS
Qty: 0 | Refills: 0 | Status: DISCONTINUED | OUTPATIENT
Start: 2018-04-11 | End: 2018-04-14

## 2018-04-11 RX ORDER — CLOTRIMAZOLE 10 MG
1 TROCHE MUCOUS MEMBRANE
Qty: 0 | Refills: 0 | Status: DISCONTINUED | OUTPATIENT
Start: 2018-04-11 | End: 2018-04-16

## 2018-04-11 RX ORDER — HEPARIN SODIUM 5000 [USP'U]/ML
INJECTION INTRAVENOUS; SUBCUTANEOUS
Qty: 25000 | Refills: 0 | Status: DISCONTINUED | OUTPATIENT
Start: 2018-04-11 | End: 2018-04-14

## 2018-04-11 RX ADMIN — Medication 100 MILLIGRAM(S): at 16:16

## 2018-04-11 RX ADMIN — Medication 500 MILLIGRAM(S): at 11:50

## 2018-04-11 RX ADMIN — Medication 250 MILLIGRAM(S): at 05:05

## 2018-04-11 RX ADMIN — Medication 1 LOZENGE: at 22:37

## 2018-04-11 RX ADMIN — Medication 100 MILLIGRAM(S): at 22:37

## 2018-04-11 RX ADMIN — Medication 100 MILLIGRAM(S): at 01:44

## 2018-04-11 RX ADMIN — BUDESONIDE AND FORMOTEROL FUMARATE DIHYDRATE 2 PUFF(S): 160; 4.5 AEROSOL RESPIRATORY (INHALATION) at 05:05

## 2018-04-11 RX ADMIN — OXYCODONE HYDROCHLORIDE 5 MILLIGRAM(S): 5 TABLET ORAL at 09:35

## 2018-04-11 RX ADMIN — HEPARIN SODIUM 2100 UNIT(S)/HR: 5000 INJECTION INTRAVENOUS; SUBCUTANEOUS at 16:55

## 2018-04-11 RX ADMIN — Medication 100 MILLIGRAM(S): at 23:14

## 2018-04-11 RX ADMIN — Medication 1 MILLIGRAM(S): at 11:50

## 2018-04-11 RX ADMIN — Medication 325 MILLIGRAM(S): at 11:50

## 2018-04-11 RX ADMIN — Medication 1 TABLET(S): at 13:50

## 2018-04-11 RX ADMIN — PANTOPRAZOLE SODIUM 40 MILLIGRAM(S): 20 TABLET, DELAYED RELEASE ORAL at 05:05

## 2018-04-11 RX ADMIN — Medication 1 LOZENGE: at 16:18

## 2018-04-11 RX ADMIN — Medication 1 TABLET(S): at 05:05

## 2018-04-11 RX ADMIN — Medication 1 LOZENGE: at 12:04

## 2018-04-11 RX ADMIN — Medication 100 MILLIGRAM(S): at 09:25

## 2018-04-11 RX ADMIN — BUDESONIDE AND FORMOTEROL FUMARATE DIHYDRATE 2 PUFF(S): 160; 4.5 AEROSOL RESPIRATORY (INHALATION) at 17:36

## 2018-04-11 RX ADMIN — TIOTROPIUM BROMIDE 1 CAPSULE(S): 18 CAPSULE ORAL; RESPIRATORY (INHALATION) at 12:04

## 2018-04-11 RX ADMIN — Medication 1 TABLET(S): at 11:51

## 2018-04-11 RX ADMIN — Medication 1 TABLET(S): at 22:37

## 2018-04-11 RX ADMIN — HEPARIN SODIUM 2100 UNIT(S)/HR: 5000 INJECTION INTRAVENOUS; SUBCUTANEOUS at 23:39

## 2018-04-11 RX ADMIN — Medication 100 MILLIGRAM(S): at 13:50

## 2018-04-11 RX ADMIN — OXYCODONE HYDROCHLORIDE 5 MILLIGRAM(S): 5 TABLET ORAL at 10:15

## 2018-04-11 RX ADMIN — Medication 81 MILLIGRAM(S): at 11:50

## 2018-04-11 RX ADMIN — Medication 250 MILLIGRAM(S): at 17:36

## 2018-04-11 RX ADMIN — Medication 650 MILLIGRAM(S): at 14:19

## 2018-04-11 RX ADMIN — Medication 100 MILLIGRAM(S): at 05:05

## 2018-04-11 RX ADMIN — POLYETHYLENE GLYCOL 3350 17 GRAM(S): 17 POWDER, FOR SOLUTION ORAL at 11:51

## 2018-04-11 RX ADMIN — Medication 1 LOZENGE: at 23:14

## 2018-04-11 NOTE — DISCHARGE NOTE ADULT - HOSPITAL COURSE
78yo f PMH including HLD, GERD, Anxiety, Anemia, CAD s/p HERBERT, severe AS (s/p TAVR & PPM 12/17 [place] [employer] Model [id number]), h/o MVR, PMR on chronic steroids, asthma, OA, s/p TKR with recent joint infection s/p explant and abx spacer on 12/23/17, + rehab when had RLE DVT (dvt proph was held 2nd nose bleed)  on Coumadins/p  3/23/2018 Right knee explantation of previously placed articulating antibiotic spacer, irrigation and debridement of the knee, placement of static antibiotic spacer, with a Plastic Surgery soft tissue complex wound closure.   per ID: complete 6 weeks of iv daptomycin (end 5/5) and then prolonged po keflex and minocycline, local wound care per plastics     #Fever/sepsis- source likely infected wound/hematoma medial thigh  vs ct chest : multifocal pna    Recent septic Rt TKR- abx spacer exchange and complex wound closure   Supra therapeutic INR/coagulopathy   PMR on chr steroids on hold     ct c/a/p  reviewed as above pna-pt asymptomatic  cont to hold coumadin-likely nutritional vitk deffi/sepsis- heme c/s  Rheum c/s given hx of PMR on steroids and pt with fever, elev esr  Surgery , ortho and plastics 76yo f PMH including HLD, GERD, Anxiety, Anemia, CAD s/p HERBERT, severe AS (s/p TAVR & PPM 12/17 [place] [employer] Model [id number]), h/o MVR, PMR on chronic steroids, asthma, OA, s/p TKR with recent joint infection s/p explant and abx spacer on 12/23/17, + rehab when had RLE DVT (dvt proph was held 2nd nose bleed)  on Coumadins/p  3/23/2018 Right knee explantation of previously placed articulating antibiotic spacer, irrigation and debridement of the knee, placement of static antibiotic spacer, with a Plastic Surgery soft tissue complex wound closure.   per ID: complete 6 weeks of iv daptomycin (end 5/5) and then prolonged po keflex and minocycline, local wound care per plastics     #Fever/sepsis- source likely infected wound/hematoma medial thigh  vs ct chest : multifocal pna    Recent septic Rt TKR- abx spacer exchange and complex wound closure   Supra therapeutic INR/coagulopathy resolved- coumadin dose per INR   seen by ID on antibiotic management, ortho/plastics on board 78yo f PMH including HLD, GERD, Anxiety, Anemia, CAD s/p HERBERT, severe AS (s/p TAVR & PPM 12/17 [place] [employer] Model [id number]), h/o MVR, PMR on chronic steroids, asthma, OA, s/p TKR with recent joint infection s/p explant and abx spacer on 12/23/17, + rehab when had RLE DVT (dvt proph was held 2nd nose bleed)  on Coumadins/p  3/23/2018 Right knee explantation of previously placed articulating antibiotic spacer, irrigation and debridement of the knee, placement of static antibiotic spacer, with a Plastic Surgery soft tissue complex wound closure.   per ID: complete 6 weeks of iv daptomycin (end 5/5) and then prolonged po keflex and minocycline, local wound care per plastics     #Fever/sepsis- source likely infected wound/hematoma medial thigh  vs ct chest : multifocal pna    Recent septic Rt TKR- abx spacer exchange and complex wound closure   Supra therapeutic INR/coagulopathy resolved- coumadin dose per INR   seen by ID on antibiotic management, ortho/plastics on board.  Continue Daptomycin therapy targeting MRSA, day 34/42 78yo f PMH including HLD, GERD, Anxiety, Anemia, CAD s/p HERBERT, severe AS (s/p TAVR & PPM 12/17 [place] [employer] Model [id number]), h/o MVR, PMR on chronic steroids, asthma, OA, s/p TKR with recent joint infection s/p explant and abx spacer on 12/23/17, + rehab when had RLE DVT (dvt proph was held 2nd nose bleed)  on Coumadins/p  3/23/2018 Right knee explantation of previously placed articulating antibiotic spacer, irrigation and debridement of the knee, placement of static antibiotic spacer, with a Plastic Surgery soft tissue complex wound closure.   per ID: complete 6 weeks of iv daptomycin (end 5/5) and then prolonged po keflex and minocycline, local wound care per plastics     #Fever/sepsis- source likely infected wound/hematoma medial thigh  vs ct chest : multifocal pna    Recent septic Rt TKR- abx spacer exchange and complex wound closure   Supra therapeutic INR/coagulopathy resolved- coumadin dose per INR   seen by ID on antibiotic management, ortho/plastics on board.  Continue Daptomycin therapy targeting MRSA, day /42 76yo f PMH including HLD, GERD, Anxiety, Anemia, CAD s/p HERBERT, severe AS (s/p TAVR & PPM 12/17 [place] [employer] Model [id number]), h/o MVR, PMR on chronic steroids, asthma, OA, s/p TKR with recent joint infection s/p explant and abx spacer on 12/23/17, + rehab when had RLE DVT (dvt proph was held 2nd nose bleed)  on Coumadins/p  3/23/2018 Right knee explantation of previously placed articulating antibiotic spacer, irrigation and debridement of the knee, placement of static antibiotic spacer, with a Plastic Surgery soft tissue complex wound closure.   per ID: completed 6 weeks of iv daptomycin (end 5/5)  and minocycline, local wound care per plastics     #Fever/sepsis- source likely infected wound/hematoma medial thigh  vs ct chest : multifocal pna    Recent septic Rt TKR- abx spacer exchange and complex wound closure   Supra therapeutic INR/coagulopathy resolved- coumadin dose per INR   seen by ID on antibiotic management, ortho/plastics on board.  completed  Daptomycin therapy targeting MRSA, on minocycline for suppressive therapy  right thigh wound/abdomen wound with wound vac; with skin impairement 78yo f PMH including HLD, GERD, Anxiety, Anemia, CAD s/p HERBERT, severe AS (s/p TAVR & PPM 12/17 [place] [employer] Model [id number]), h/o MVR, PMR on chronic steroids, asthma, OA, s/p TKR with recent joint infection s/p explant and abx spacer on 12/23/17, + rehab when had RLE DVT (dvt proph was held 2nd nose bleed)  on Coumadins/p  3/23/2018 Right knee explantation of previously placed articulating antibiotic spacer, irrigation and debridement of the knee, placement of static antibiotic spacer, with a Plastic Surgery soft tissue complex wound closure.   per ID: completed 6 weeks of iv daptomycin (end 5/5)  and minocycline, local wound care per plastics     #Fever/sepsis- source likely infected wound/hematoma medial thigh  vs ct chest : multifocal pna    Recent septic Rt TKR- abx spacer exchange and complex wound closure   Supra therapeutic INR/coagulopathy resolved- coumadin dose per INR   seen by ID on antibiotic management, ortho/plastics on board.  completed  Daptomycin therapy targeting MRSA, on minocycline for suppressive therapy  right thigh wound/abdomen wound with wound vac , change  3 times a week ; left thigh  with skin impairment; seen by derm; r/o pyoderma gangrenosum; 76yo f PMH including HLD, GERD, Anxiety, Anemia, CAD s/p HERBERT, severe AS (s/p TAVR & PPM 12/17 [place] [employer] Model [id number]), h/o MVR, PMR on chronic steroids, asthma, OA, s/p TKR with recent joint infection s/p explant and abx spacer on 12/23/17, + rehab when had RLE DVT (dvt proph was held 2nd nose bleed)  on Coumadins/p  3/23/2018 Right knee explantation of previously placed articulating antibiotic spacer, irrigation and debridement of the knee, placement of static antibiotic spacer, with a Plastic Surgery soft tissue complex wound closure.   per ID: completed 6 weeks of iv daptomycin (end 5/5)  and minocycline for long time for suppression therapy- local wound care per plastics / wound care    #Fever/sepsis- source likely infected wound/hematoma medial thigh  vs ct chest : multifocal pna    Recent septic Rt TKR- abx spacer exchange and complex wound closure   Supra therapeutic INR/coagulopathy resolved- coumadin dose per INR   seen by ID on antibiotic management, ortho/plastics on board.  completed  Daptomycin therapy targeting MRSA, on minocycline for suppressive therapy'  seen by derm/rheumatology;  right thigh wound/abdomen wound with wound vac , change  3 times a week ; left thigh  with skin impairment; seen by derm; r/o pyoderma gangrenosum;  Rt Thigh & ABdominal wall wound - VAC per Wound PT & plastics  Lt & Rt thigh wounds - pack w/ Dakins  Only infer Lt lateral thigh wound aquacel  Small eschars- cavilon  follow up by wound care;plastics/physical therapy

## 2018-04-11 NOTE — CONSULT NOTE ADULT - SUBJECTIVE AND OBJECTIVE BOX
Patient seen and evaluated @ 2130 on 3 [address]  Chief Complaint:     HPI:  Review of history and patient recent course:  78yo f PMH including HLD, GERD, Anxiety, Anemia, CAD s/p HERBERT, severe AS (s/p TAVR & PPM 12/17 [place] [employer] Model [id number]), h/o MVR, PMR on chronic steroids, asthma, OA, s/p TKR with recent joint infection s/p explant and abx spacer on 12/23/17, + rehab when had RLE DVT (dvt proph was held 2nd nose bleed)  on Coumadins/p  3/23/2018 Right knee explantation of previously placed articulating antibiotic spacer, irrigation and debridement of the knee, placement of static antibiotic spacer, with a Plastic Surgery soft tissue complex wound closure.   per ID: complete 6 weeks of iv daptomycin (end 5/5) and then prolonged po keflex and minocycline, local wound care per plastics monitor CMP, CBC with differential and CPK weekly.   Orthopedics consulted: continue antibiotics as per ID and wound treatment as per plastics. Ice, elevate 50% PWD. Pt currently no knee ROM.   Pt was consulted by Cardiology for short episode of Vtach: Pt started on low dose Beta blocker and tolerated. Coumadin for DVT. Monitor INR.  HIstory via patient and aide at bedside.  Originally at rehab pt was developing worsening weakness.  Overall poor appetite.  Developed fever/chills yesterday and taken to ED for further work up.  Seen by ortho and plastics in ED with fever to 103.5.  Given vanco/aztreonam, tylenol and oxycodone 5mg x 1. (09 Apr 2018 01:13)    PMH:   CAD (coronary artery disease)  Aortic stenosis, severe  Uncomplicated asthma, unspecified asthma severity  Polymyalgia  Lumbar disc disease with radiculopathy  Spider veins  Anxiety  Severe aortic stenosis by prior echocardiogram  Dysphagia  Morbid obesity with BMI of 50.0-59.9, adult  Macular degeneration  Hiatal hernia  GERD (gastroesophageal reflux disease)  Sciatica  Osteoarthritis  HTN (hypertension)    PSH:   S/P TKR (total knee replacement)  S/P TAVR (transcatheter aortic valve replacement)  Artificial cardiac pacemaker  H/O cardiac catheterization  H/O endoscopy  S/P cataract surgery  S/P gastroplasty  S/P cholecystectomy  S/P total knee replacement  S/P total knee replacement  S/P hysterectomy    Medications:   acetaminophen   Tablet 650 milliGRAM(s) Oral every 6 hours PRN  acetaminophen   Tablet. 650 milliGRAM(s) Oral every 6 hours PRN  ascorbic acid 500 milliGRAM(s) Oral daily  aspirin enteric coated 81 milliGRAM(s) Oral daily  aztreonam  IVPB 2000 milliGRAM(s) IV Intermittent every 8 hours  buDESOnide 160 MICROgram(s)/formoterol 4.5 MICROgram(s) Inhaler 2 Puff(s) Inhalation two times a day  calcium carbonate 1250 mG + Vitamin D (OsCal 500 + D) 1 Tablet(s) Oral three times a day  clotrimazole Lozenge 1 Lozenge Oral five times a day  docusate sodium 100 milliGRAM(s) Oral three times a day  ferrous    sulfate 325 milliGRAM(s) Oral daily  folic acid 1 milliGRAM(s) Oral daily  heparin  Infusion.  Unit(s)/Hr IV Continuous <Continuous>  heparin  Injectable 9500 Unit(s) IV Push every 6 hours PRN  heparin  Injectable 4500 Unit(s) IV Push every 6 hours PRN  multivitamin 1 Tablet(s) Oral daily  oxyCODONE    IR 5 milliGRAM(s) Oral every 4 hours PRN  pantoprazole    Tablet 40 milliGRAM(s) Oral before breakfast  polyethylene glycol 3350 17 Gram(s) Oral daily  senna 2 Tablet(s) Oral at bedtime PRN  tiotropium 18 MICROgram(s) Capsule 1 Capsule(s) Inhalation daily  vancomycin  IVPB 1000 milliGRAM(s) IV Intermittent every 12 hours    Allergies:  amoxicillin (Other)  IV Contrast (Flushing (Skin); Nausea)    FAMILY HISTORY:  No pertinent family history in first degree relatives    Social History: , retired  Smoking: nonsmoker  Alcohol: social EtOH    Review of Systems:    Constitutional: No fever, chills, fatigue, or changes in weight  HEENT: No blurry vision, eye pain, headache, runny nose, or sore throat  Respiratory: No shortness of breath, cough, or wheezing  Cardiovascular: No chest pain or palpitations  Gastrointestinal: No nausea, vomiting, diarrhea, or abdominal pain  Genitourinary: No dysuria or incontinence  Extremities: +LE numbness  Neurologic: No focal findings  Lymphatic: No lymphadenopathy   Skin: No rash  Psychiatry: No anxiety or depression  10 point review of systems is otherwise negative except as mentioned above            Physical Exam:  T(C): 36.7 (04-11-18 @ 19:34), Max: 38 (04-11-18 @ 14:00)  HR: 76 (04-11-18 @ 19:34) (76 - 90)  BP: 115/69 (04-11-18 @ 19:34) (113/66 - 135/71)  RR: 18 (04-11-18 @ 19:34) (18 - 18)  SpO2: 98% (04-11-18 @ 19:34) (98% - 99%)  Wt(kg): --    04-10 @ 07:01  -  04-11 @ 07:00  --------------------------------------------------------  IN: 1310 mL / OUT: 425 mL / NET: 885 mL    04-11 @ 07:01  -  04-11 @ 21:34  --------------------------------------------------------  IN: 1212 mL / OUT: 450 mL / NET: 762 mL      Daily     Daily     Appearance: Obese, NAD  Eyes: PERRLA, EOMI, pale conjunctiva, no scleral icterus   HENT: Normal oral mucosa  Cardiovascular: RRR, S1, S2, no murmur, rub, or gallop; no JVD  Procedural Access Site: Clean, dry, intact, without hematoma - right leg in brace  Respiratory: Clear to auscultation bilaterally anteriorly  Gastrointestinal: Soft, non-tender, non-distended, BS+  Musculoskeletal: No clubbing or joint deformity   Neurologic: No focal weakness  Lymphatic: No lymphadenopathy  Psychiatry: AAOx3 with appropriate mood and affect  Skin: No rashes, ecchymoses, or cyanosis    Cardiovascular Diagnostic Testing:    ECG: sinus tachycardia with RBBB, LAFB    Echo: < from: Transthoracic Echocardiogram (01.25.18 @ 14:10) >  ------------------------------------------------------------------------  Conclusions:  1. Mitral annular calcification. Mild mitral regurgitation.    2. Transcatheter aortic valve replacement. The valve is  well seated.  Peak transaortic valve gradient equals 36 mm  Hg, mean transaortic valve gradient equals 19 mm Hg, which  is probably normal in the presence of a transcatheter  aortic valve replacement. No valvular aortic valve  regurgitation and minimal paravalvular aortic  regurgitation.  3. Severely dilated left atrium.  LA volume index = 50  cc/m2.  4. Normal left ventricular systolic function. The basal  inferolateral wall, and the basal inferior wall are mildly  hypokinetic.  5. Moderate diastolic dysfunction (Stage II).  6. Right ventricular enlargement with normal right  ventricular systolic function. A device wire is noted in  the right heart.  7. Tricuspid valve not well visualized. Moderate tricuspid  regurgitation.  ------------------------------------------------------------------------  Confirmed on  1/25/2018 - 16:39:53 by Jonathan Pastor M.D.  ------------------------------------------------------------------------    < end of copied text >    Cath: December 2016 - PCI to LAD and TAVR    Interpretation of Telemetry: patient not on tele    Imaging:    Labs:                        7.7    12.81 )-----------( 239      ( 11 Apr 2018 07:27 )             25.1     04-11    132<L>  |  96  |  18  ----------------------------<  108<H>  4.1   |  24  |  0.88    Ca    8.5      11 Apr 2018 06:31      PT/INR - ( 11 Apr 2018 15:10 )   PT: 17.2 sec;   INR: 1.58 ratio         PTT - ( 10 Apr 2018 09:40 )  PTT:66.0 sec

## 2018-04-11 NOTE — DISCHARGE NOTE ADULT - CARE PROVIDER_API CALL
Roverto Roblero (MD; PhD), Cardiology; Internal Medicine; Vascular Medicine  [id number] [place] [address]   [place] [address]  Phone: [phone]  Fax: [phone]    Armando Mace), Internal Medicine  [address] [facility] [address]  [place] [address]  Phone: [phone]  Fax: [phone] Roverto Roblero; PhD), Cardiology; Internal Medicine; Vascular Medicine  [address] [place] [address]   [place] [address]  Phone: [phone]  Fax: [phone]    Armando Mace (MD), Internal Medicine  [address] [facility] [address]  [place] [address]  Phone: [phone]  Fax: [phone]    Nate Bass), Orthopedics  [address] [place] [address]  Phone: [phone]  Fax: [phone] Roverto Roblero (MD; PhD), Cardiology; Internal Medicine; Vascular Medicine  [address] [place] [address]  [place] [address]  Phone: [phone]  Fax: [phone]    Armando Mace (MD), Internal Medicine  [address] [facility] [address]  [place] [address]  Phone: [phone]  Fax: [phone]    Nate Bass), Orthopedics  [address] [place] [address]  Phone: [phone]  Fax: [phone]    Brian Mg), Surgery  PlasticReconstruct  [address] [facility]  [address]  [place] [address]  Phone: [phone]  Fax: [phone] Roverto Roblero (MD; PhD), Cardiology; Internal Medicine; Vascular Medicine  [address] [place] [address]  [place] [address]  Phone: [phone]  Fax: [phone]    Armando Mace (MD), Internal Medicine  [address] [facility] [address]  [place] [address]  Phone: [phone]  Fax: [phone]    Nate Bass), Orthopedics  [address] [place] [address]  Phone: [phone]  Fax: [phone]    Brian Mg), Surgery  PlasticReconstruct  [address] [facility]  [address]  [place] [address]  Phone: [phone]  Fax: [phone]

## 2018-04-11 NOTE — PROGRESS NOTE ADULT - SUBJECTIVE AND OBJECTIVE BOX
CHIEF COMPLAINT:    Interval Events:    REVIEW OF SYSTEMS:  Constitutional: No fevers or chills. No weight loss. No fatigue or generalized malaise.  Eyes: No itching or discharge from the eyes  ENT: No ear pain. No ear discharge. No nasal congestion. No post nasal drip. No epistaxis. No throat pain. No sore throat. No difficulty swallowing.   CV: No chest pain. No palpitations. No lightheadedness or dizziness.   Resp: No dyspnea at rest. No dyspnea on exertion. No orthopnea. No wheezing. No cough. No stridor. No sputum production. No chest pain with respiration.  GI: No nausea. No vomiting. No diarrhea.  MSK: No joint pain or pain in any extremities  Integumentary: No skin lesions. No pedal edema.  Neurological: No gross motor weakness. No sensory changes.  [ ] All other systems negative  [ ] Unable to assess ROS because ________    OBJECTIVE:  ICU Vital Signs Last 24 Hrs  T(C): 36.7 (11 Apr 2018 04:26), Max: 38.1 (10 Apr 2018 17:39)  T(F): 98.1 (11 Apr 2018 04:26), Max: 100.6 (10 Apr 2018 17:39)  HR: 82 (11 Apr 2018 04:26) (79 - 105)  BP: 118/65 (11 Apr 2018 04:26) (109/64 - 129/66)  BP(mean): --  ABP: --  ABP(mean): --  RR: 18 (11 Apr 2018 04:26) (17 - 20)  SpO2: 98% (11 Apr 2018 04:26) (93% - 99%)        04-09 @ 07:01  -  04-10 @ 07:00  --------------------------------------------------------  IN: 120 mL / OUT: 450 mL / NET: -330 mL    04-10 @ 07:01  -  04-11 @ 05:05  --------------------------------------------------------  IN: 1310 mL / OUT: 325 mL / NET: 985 mL      CAPILLARY BLOOD GLUCOSE          PHYSICAL EXAM:  General: Awake, alert, oriented X 3.   HEENT: Atraumatic, normocephalic.                 Mallampatti Grade                 No nasal congestion.                No tonsillar or pharyngeal exudates.  Lymph Nodes: No palpable lymphadenopathy  Neck: No JVD. No carotid bruit.   Respiratory: Normal chest expansion                         Normal percussion                         Normal and equal air entry                         No wheeze, rhonchi or rales.  Cardiovascular: S1 S2 normal. No murmurs, rubs or gallops.   Abdomen: Soft, non-tender, non-distended. No organomegaly.  Extremities: Warm to touch. Peripheral pulse palpable. No pedal edema.   Skin: No rashes or skin lesions  Neurological: Motor and sensory examination equal and normal in all four extremities.  Psychiatry: Appropriate mood and affect.    HOSPITAL MEDICATIONS:  MEDICATIONS  (STANDING):  ascorbic acid 500 milliGRAM(s) Oral daily  aspirin enteric coated 81 milliGRAM(s) Oral daily  aztreonam  IVPB 2000 milliGRAM(s) IV Intermittent every 8 hours  buDESOnide 160 MICROgram(s)/formoterol 4.5 MICROgram(s) Inhaler 2 Puff(s) Inhalation two times a day  calcium carbonate 1250 mG + Vitamin D (OsCal 500 + D) 1 Tablet(s) Oral three times a day  docusate sodium 100 milliGRAM(s) Oral three times a day  ferrous    sulfate 325 milliGRAM(s) Oral daily  folic acid 1 milliGRAM(s) Oral daily  multivitamin 1 Tablet(s) Oral daily  pantoprazole    Tablet 40 milliGRAM(s) Oral before breakfast  polyethylene glycol 3350 17 Gram(s) Oral daily  tiotropium 18 MICROgram(s) Capsule 1 Capsule(s) Inhalation daily  vancomycin  IVPB 1000 milliGRAM(s) IV Intermittent every 12 hours    MEDICATIONS  (PRN):  acetaminophen   Tablet 650 milliGRAM(s) Oral every 6 hours PRN For Temp greater than 38 C (100.4 F)  acetaminophen   Tablet. 650 milliGRAM(s) Oral every 6 hours PRN Moderate Pain (4 - 6)  oxyCODONE    IR 5 milliGRAM(s) Oral every 4 hours PRN Severe Pain (7 - 10)  senna 2 Tablet(s) Oral at bedtime PRN Constipation      LABS:                        7.5    13.5  )-----------( 224      ( 10 Apr 2018 22:49 )             23.1     04-09    131<L>  |  96  |  16  ----------------------------<  119<H>  3.8   |  24  |  0.84    Ca    8.0<L>      09 Apr 2018 06:38  Phos  2.8     04-09  Mg     1.6     04-09      PT/INR - ( 10 Apr 2018 18:55 )   PT: 95.6 sec;   INR: 8.40 ratio         PTT - ( 10 Apr 2018 09:40 )  PTT:66.0 sec          MICROBIOLOGY:     RADIOLOGY:  [ ] Reviewed and interpreted by me    Point of Care Ultrasound Findings:    PFT:    EKG: CHIEF COMPLAINT: cold and throat clearing, leg bothering her    Interval Events: rheum and ID    REVIEW OF SYSTEMS:  Constitutional: No fevers or chills. No weight loss. + fatigue or generalized malaise.  Eyes: No itching or discharge from the eyes  ENT: No ear pain. No ear discharge. No nasal congestion. No post nasal drip. No epistaxis. No throat pain. No sore throat. No difficulty swallowing.   CV: No chest pain. No palpitations. No lightheadedness or dizziness.   Resp: No dyspnea at rest. + dyspnea on exertion. No orthopnea. No wheezing. No cough. No stridor. No sputum production. No chest pain with respiration.  GI: No nausea. No vomiting. No diarrhea.  MSK: No joint pain or pain in any extremities  Integumentary: No skin lesions. + pedal edema.  Neurological: No gross motor weakness. No sensory changes.  [+ ] All other systems negative  [ ] Unable to assess ROS because ________    OBJECTIVE:  ICU Vital Signs Last 24 Hrs  T(C): 36.7 (11 Apr 2018 04:26), Max: 38.1 (10 Apr 2018 17:39)  T(F): 98.1 (11 Apr 2018 04:26), Max: 100.6 (10 Apr 2018 17:39)  HR: 82 (11 Apr 2018 04:26) (79 - 105)  BP: 118/65 (11 Apr 2018 04:26) (109/64 - 129/66)  BP(mean): --  ABP: --  ABP(mean): --  RR: 18 (11 Apr 2018 04:26) (17 - 20)  SpO2: 98% (11 Apr 2018 04:26) (93% - 99%)        04-09 @ 07:01  -  04-10 @ 07:00  --------------------------------------------------------  IN: 120 mL / OUT: 450 mL / NET: -330 mL    04-10 @ 07:01  -  04-11 @ 05:05  --------------------------------------------------------  IN: 1310 mL / OUT: 325 mL / NET: 985 mL      CAPILLARY BLOOD GLUCOSE          PHYSICAL EXAM: NAD in bed on NC  General: Awake, alert, oriented X 3.   HEENT: Atraumatic, normocephalic.                 Mallampatti Grade 3                No nasal congestion.                No tonsillar or pharyngeal exudates.  Lymph Nodes: No palpable lymphadenopathy  Neck: No JVD. No carotid bruit.   Respiratory: abnormal chest expansion-reduced                         Normal percussion                         reduced but equal air entry                         No wheeze, rhonchi or rales.  Cardiovascular: S1 S2 normal. No murmurs, rubs or gallops.   Abdomen: Soft, non-tender, non-distended. No organomegaly.  Extremities: Warm to touch. Peripheral pulse palpable. 1 + on left  pedal edema. RLE wrapped  Skin: No rashes or skin lesions  Neurological: Motor and sensory examination equal and normal in all four extremities.  Psychiatry: Appropriate mood and affect.    HOSPITAL MEDICATIONS:  MEDICATIONS  (STANDING):  ascorbic acid 500 milliGRAM(s) Oral daily  aspirin enteric coated 81 milliGRAM(s) Oral daily  aztreonam  IVPB 2000 milliGRAM(s) IV Intermittent every 8 hours  buDESOnide 160 MICROgram(s)/formoterol 4.5 MICROgram(s) Inhaler 2 Puff(s) Inhalation two times a day  calcium carbonate 1250 mG + Vitamin D (OsCal 500 + D) 1 Tablet(s) Oral three times a day  docusate sodium 100 milliGRAM(s) Oral three times a day  ferrous    sulfate 325 milliGRAM(s) Oral daily  folic acid 1 milliGRAM(s) Oral daily  multivitamin 1 Tablet(s) Oral daily  pantoprazole    Tablet 40 milliGRAM(s) Oral before breakfast  polyethylene glycol 3350 17 Gram(s) Oral daily  tiotropium 18 MICROgram(s) Capsule 1 Capsule(s) Inhalation daily  vancomycin  IVPB 1000 milliGRAM(s) IV Intermittent every 12 hours    MEDICATIONS  (PRN):  acetaminophen   Tablet 650 milliGRAM(s) Oral every 6 hours PRN For Temp greater than 38 C (100.4 F)  acetaminophen   Tablet. 650 milliGRAM(s) Oral every 6 hours PRN Moderate Pain (4 - 6)  oxyCODONE    IR 5 milliGRAM(s) Oral every 4 hours PRN Severe Pain (7 - 10)  senna 2 Tablet(s) Oral at bedtime PRN Constipation      LABS:                        7.5    13.5  )-----------( 224      ( 10 Apr 2018 22:49 )             23.1     04-09    131<L>  |  96  |  16  ----------------------------<  119<H>  3.8   |  24  |  0.84    Ca    8.0<L>      09 Apr 2018 06:38  Phos  2.8     04-09  Mg     1.6     04-09      PT/INR - ( 10 Apr 2018 18:55 )   PT: 95.6 sec;   INR: 8.40 ratio         PTT - ( 10 Apr 2018 09:40 )  PTT:66.0 sec          MICROBIOLOGY:     RADIOLOGY: < from: CT Chest No Cont (04.09.18 @ 22:31) >  LUNGS AND LARGE AIRWAYS: Small secretions in the proximal trachea. Patchy   consolidation in the right lower lobe as well as diffuse scattered   nodular and groundglass opacities concerning for multifocal infection.   Subsegmental atelectasis in the left lower lobe.  PLEURA: Trace bilateral pleural effusions.  VESSELS: Coronary atherosclerosis. Right PICC line terminates in the SVC.  HEART: Cardiomegaly. No pericardial effusion. Status post TAVR.   MEDIASTINUM AND PEGGY: No lymphadenopathy.  CHEST WALL AND LOWER NECK: Left chest wall cardiac device.    ABDOMEN AND PELVIS:    LIVER: Within normal limits.  BILE DUCTS: Normal caliber.  GALLBLADDER: Cholecystectomy.  SPLEEN: Within normal limits.  PANCREAS: Within normal limits.  ADRENALS: Within normal limits.  KIDNEYS/URETERS: Left renal cyst.    BLADDER: Collapsed around a Navarrete catheter.  REPRODUCTIVE ORGANS: Hysterectomy. No adnexal masses.    BOWEL: Status post gastric surgery. No bowel obstruction. Appendix is   normal.  PERITONEUM: No ascites.  VESSELS: Atherosclerotic changes. Infrarenal IVC filter.  RETROPERITONEUM: No lymphadenopathy. No retroperitoneal hematoma.   ABDOMINAL WALL: Rectus diastases. A 3.5 cm subcutaneous hematoma within   the left lower anterior abdominal wall.  BONES: Degenerative changes.    IMPRESSION: < from: CT Chest No Cont (04.09.18 @ 22:31) >  Multifocal pneumonia.    No acute intra-abdominal pathology.    No evidence of retroperitoneal hematoma. Small subcutaneous hematoma   within the left lower anterior abdominal wall.      < end of copied text >      < end of copied text >    [ ] Reviewed and interpreted by me    Point of Care Ultrasound Findings:    PFT:    EKG:

## 2018-04-11 NOTE — PROGRESS NOTE ADULT - SUBJECTIVE AND OBJECTIVE BOX
Patient is a 77y old  Female who presents with a chief complaint of fever (09 Apr 2018 01:13)  pt seen and examined at bedside   SUBJECTIVE/OVERNIGHT events noted, feels little better today  decreased pain at medial thigh area    Vital Signs Last 24 Hrs  T(C): 38 (11 Apr 2018 14:00), Max: 38.1 (10 Apr 2018 17:39)  T(F): 100.4 (11 Apr 2018 14:00), Max: 100.6 (10 Apr 2018 17:39)  HR: 90 (11 Apr 2018 14:00) (79 - 105)  BP: 135/71 (11 Apr 2018 14:00) (109/64 - 135/71)  BP(mean): --  RR: 18 (11 Apr 2018 14:00) (17 - 18)  SpO2: 98% (11 Apr 2018 14:00) (93% - 99%)  CAPILLARY BLOOD GLUCOSE        MEDICATIONS  (STANDING):  ascorbic acid 500 milliGRAM(s) Oral daily  aspirin enteric coated 81 milliGRAM(s) Oral daily  aztreonam  IVPB 2000 milliGRAM(s) IV Intermittent every 8 hours  buDESOnide 160 MICROgram(s)/formoterol 4.5 MICROgram(s) Inhaler 2 Puff(s) Inhalation two times a day  calcium carbonate 1250 mG + Vitamin D (OsCal 500 + D) 1 Tablet(s) Oral three times a day  clotrimazole Lozenge 1 Lozenge Oral five times a day  docusate sodium 100 milliGRAM(s) Oral three times a day  ferrous    sulfate 325 milliGRAM(s) Oral daily  folic acid 1 milliGRAM(s) Oral daily  multivitamin 1 Tablet(s) Oral daily  pantoprazole    Tablet 40 milliGRAM(s) Oral before breakfast  polyethylene glycol 3350 17 Gram(s) Oral daily  tiotropium 18 MICROgram(s) Capsule 1 Capsule(s) Inhalation daily  vancomycin  IVPB 1000 milliGRAM(s) IV Intermittent every 12 hours    MEDICATIONS  (PRN):  acetaminophen   Tablet 650 milliGRAM(s) Oral every 6 hours PRN For Temp greater than 38 C (100.4 F)  acetaminophen   Tablet. 650 milliGRAM(s) Oral every 6 hours PRN Moderate Pain (4 - 6)  oxyCODONE    IR 5 milliGRAM(s) Oral every 4 hours PRN Severe Pain (7 - 10)  senna 2 Tablet(s) Oral at bedtime PRN Constipation    PHYSICAL EXAM  General : comfortable, not in any acute distress  Neck : supple, no LAD, no JVD  Eyes : EOMI, PERRLA, conjunctiva : no icterus, no pallor  MM moist, no pharyngeal erythema/exudates  Pulmonary: clear to auscultation bilateral lung fields, no crackles/rhonchi/wheezing,   CVS : S1 S2,rate normal-,rhythm-regular, no murmurs, no abnormal sounds  Gastrointestinal: soft, non tender, non distended, no organomegaly , bowel sounds audible  Extremities: no edema  Neuro: AAOx3, no focal deficits  Musculoskeletal:  FROM of all ext  Skin: no rash  LABS                        7.7    12.81 )-----------( 239      ( 11 Apr 2018 07:27 )             25.1     04-11    132<L>  |  96  |  18  ----------------------------<  108<H>  4.1   |  24  |  0.88    Ca    8.5      11 Apr 2018 06:31        Culture - Blood (collected 08 Apr 2018 21:46)  Source: .Blood Blood-Peripheral  Preliminary Report (09 Apr 2018 22:01):    No growth to date.    Culture - Blood (collected 08 Apr 2018 21:46)  Source: .Blood Blood-Peripheral  Preliminary Report (09 Apr 2018 22:01):    No growth to date.    Culture - Urine (collected 08 Apr 2018 21:44)  Source: .Urine Catheterized  Final Report (09 Apr 2018 21:15):    <10,000 CFU/ml    Normal Urogenital roopa present      RADIOLOGY and IMAGING reviewed: yes  Consultant notes reviewed : yes  Care discussed with Consultants/other providers : Patient is a 77y old  Female who presents with a chief complaint of fever (09 Apr 2018 01:13)  pt seen and examined at bedside   SUBJECTIVE/OVERNIGHT events noted, feels little better today  decreased pain at medial thigh area    Vital Signs Last 24 Hrs  T(C): 38 (11 Apr 2018 14:00), Max: 38.1 (10 Apr 2018 17:39)  T(F): 100.4 (11 Apr 2018 14:00), Max: 100.6 (10 Apr 2018 17:39)  HR: 90 (11 Apr 2018 14:00) (79 - 105)  BP: 135/71 (11 Apr 2018 14:00) (109/64 - 135/71)  BP(mean): --  RR: 18 (11 Apr 2018 14:00) (17 - 18)  SpO2: 98% (11 Apr 2018 14:00) (93% - 99%)  CAPILLARY BLOOD GLUCOSE        MEDICATIONS  (STANDING):  ascorbic acid 500 milliGRAM(s) Oral daily  aspirin enteric coated 81 milliGRAM(s) Oral daily  aztreonam  IVPB 2000 milliGRAM(s) IV Intermittent every 8 hours  buDESOnide 160 MICROgram(s)/formoterol 4.5 MICROgram(s) Inhaler 2 Puff(s) Inhalation two times a day  calcium carbonate 1250 mG + Vitamin D (OsCal 500 + D) 1 Tablet(s) Oral three times a day  clotrimazole Lozenge 1 Lozenge Oral five times a day  docusate sodium 100 milliGRAM(s) Oral three times a day  ferrous    sulfate 325 milliGRAM(s) Oral daily  folic acid 1 milliGRAM(s) Oral daily  multivitamin 1 Tablet(s) Oral daily  pantoprazole    Tablet 40 milliGRAM(s) Oral before breakfast  polyethylene glycol 3350 17 Gram(s) Oral daily  tiotropium 18 MICROgram(s) Capsule 1 Capsule(s) Inhalation daily  vancomycin  IVPB 1000 milliGRAM(s) IV Intermittent every 12 hours    MEDICATIONS  (PRN):  acetaminophen   Tablet 650 milliGRAM(s) Oral every 6 hours PRN For Temp greater than 38 C (100.4 F)  acetaminophen   Tablet. 650 milliGRAM(s) Oral every 6 hours PRN Moderate Pain (4 - 6)  oxyCODONE    IR 5 milliGRAM(s) Oral every 4 hours PRN Severe Pain (7 - 10)  senna 2 Tablet(s) Oral at bedtime PRN Constipation    PHYSICAL EXAM  General : comfortable, not in any acute distress  Neck : supple, no LAD, no JVD  Eyes : EOMI, PERRLA, conjunctiva : no icterus, no pallor  MM moist, no pharyngeal erythema/exudates  Pulmonary: clear to auscultation bilateral lung fields, no crackles/rhonchi/wheezing,   CVS : S1 S2,rate normal-,rhythm-regular, no murmurs, no abnormal sounds  Gastrointestinal: soft, non tender, non distended, obese  Extremities: rt knee brace, the medial thigh swollen, erythematous, eschar-blister, less tender  edema LT LE  Neuro: AAOx3, no focal deficits  Musculoskeletal:  FROM of all ext  Skin: no rash  LABS                        7.7    12.81 )-----------( 239      ( 11 Apr 2018 07:27 )             25.1     04-11    132<L>  |  96  |  18  ----------------------------<  108<H>  4.1   |  24  |  0.88    Ca    8.5      11 Apr 2018 06:31        Culture - Blood (collected 08 Apr 2018 21:46)  Source: .Blood Blood-Peripheral  Preliminary Report (09 Apr 2018 22:01):    No growth to date.    Culture - Blood (collected 08 Apr 2018 21:46)  Source: .Blood Blood-Peripheral  Preliminary Report (09 Apr 2018 22:01):    No growth to date.    Culture - Urine (collected 08 Apr 2018 21:44)  Source: .Urine Catheterized  Final Report (09 Apr 2018 21:15):    <10,000 CFU/ml    Normal Urogenital roopa present      RADIOLOGY and IMAGING reviewed: yes  Consultant notes reviewed : yes  Care discussed with Consultants/other providers :

## 2018-04-11 NOTE — DISCHARGE NOTE ADULT - NS TRANSFER PATIENT BELONGINGS
Cell Phone/PDA (specify)/Clothing/Electronic Device (specify)/Other belongings/I pad, 2 charges, pocketbook, 1 credit card

## 2018-04-11 NOTE — DISCHARGE NOTE ADULT - CARE PROVIDERS DIRECT ADDRESSES
,[email or web address],DirectAddress_[email or web address] ,[email or web address],DirectAddress_Unknown,DirectAddress_Unknown ,[email or web address],DirectAddress_Unknown,DirectAddress_Unknown,DirectAddress_Unknown

## 2018-04-11 NOTE — PROGRESS NOTE ADULT - ASSESSMENT
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 Mg  ID team recommending azetreonam/daptomycin  appreciate rheum consult  Pain control  DVT ppx, coumadin, goal INR 2-3, now resolved INR after vitK, please consider other dvt ppx other than coumadin as patient as unreliable po intake  PT/OT, oob to chair if able  50% PWB in brace, no knee flexion allowed, may remove brace while in bed  Appreciate gen surg recs, consider u/s hematoma,

## 2018-04-11 NOTE — DISCHARGE NOTE ADULT - INSTRUCTIONS
Mechanical soft  Nepro cans- 2 daily Mechanical soft  Nepro cans- 2 daily, prosource 2 cans daily, james 2 packets daily

## 2018-04-11 NOTE — PROGRESS NOTE ADULT - ASSESSMENT
Patient with infected right tkr s/p rudy, spacer for strept infection, had poor wound healing so returned 3 months after and had spacer exchange and complex wound closure with mrsa infection found. She is about 2 weeks into dapto and returns with fever, leukocytosis, ongoing pain and eschar of right knee wound and what appears to be a hematoma of the right thigh. INR has been up so that is good reason for the thigh findings. Must consider infected hematoma possible. UTI is possible. Pneumonia is possible given basilar rales. retroperitoneal hematoma possible .  PMR decompensation or adrenal insufficiency a consideration at well.    Pulmonary stable relative to prior admits Patient with infected right tkr s/p rudy, spacer for strept infection, had poor wound healing so returned 3 months after and had spacer exchange and complex wound closure with mrsa infection found. She is about 2 weeks into dapto and returns with fever, leukocytosis, ongoing pain and eschar of right knee wound and what appears to be a hematoma of the right thigh. INR has been up so that is good reason for the thigh findings. Must consider infected hematoma possible. UTI is possible. Pneumonia is possible given basilar rales. retroperitoneal hematoma possible .  PMR decompensation or adrenal insufficiency a consideration at well.    Pulmonary stable relative to prior admits   CT abnormal--this suggests aspiration and not active PNA w/paucity of clinical signs and sx (ABX should cover her for both infections)

## 2018-04-11 NOTE — CONSULT NOTE ADULT - ASSESSMENT
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    Patient does not have mechanical mitral valve.    Please continue Heparin gtt as bridge to warfarin for patient at high risk for venous thromboembolic disease.  Goal INR 2.0 - 3.0.    Monitor H/H.    Please address nutritional status as most recent albumin was 1.6 g/dL.    Ask EP to interrogate PPM to evaluate for arrhythmia.    Appreciate ID input regarding antibiotic choice and duration. If patient has persistent fevers and/or positive blood cultures, would pursue ERIKA to evaluate PPM and prosthetic aortic valve for infective endocarditis.    Will follow for cardiology at Roverto Roblero's request.

## 2018-04-11 NOTE — DISCHARGE NOTE ADULT - CARE PLAN
Principal Discharge DX:	Sepsis, due to unspecified organism Principal Discharge DX:	Sepsis, due to unspecified organism  Secondary Diagnosis:	SOB (shortness of breath)  Secondary Diagnosis:	Chronic deep vein thrombosis (DVT) of lower extremity, unspecified laterality, unspecified vein  Secondary Diagnosis:	Dysphagia Principal Discharge DX:	Sepsis, due to unspecified organism  Goal:	resolve infection  Assessment and plan of treatment:	s/p R knee abx spacer  treatment with antibiotic  Secondary Diagnosis:	SOB (shortness of breath)  Assessment and plan of treatment:	use oxygen supplement to keep pulse ox>90  Secondary Diagnosis:	Chronic deep vein thrombosis (DVT) of lower extremity, unspecified laterality, unspecified vein  Assessment and plan of treatment:	continue anticoagulation  monitor for  bleeding  monitor CBC  Secondary Diagnosis:	Dysphagia  Assessment and plan of treatment:	continue dysphagia diet with aspiration precaution Principal Discharge DX:	Sepsis, due to unspecified organism  Goal:	resolve infection  Assessment and plan of treatment:	s/p R knee abx spacer  treatment with antibiotic asper ID  Secondary Diagnosis:	SOB (shortness of breath)  Assessment and plan of treatment:	use oxygen supplement to keep pulse ox>90  Secondary Diagnosis:	Chronic deep vein thrombosis (DVT) of lower extremity, unspecified laterality, unspecified vein  Assessment and plan of treatment:	continue anticoagulation  monitor for  bleeding  monitor CBC  Secondary Diagnosis:	Dysphagia  Assessment and plan of treatment:	continue dysphagia diet with aspiration precaution Principal Discharge DX:	Sepsis, due to unspecified organism  Goal:	resolve infection  Assessment and plan of treatment:	s/p R knee abx spacer  treatment with antibiotic asper ID  Secondary Diagnosis:	SOB (shortness of breath)  Assessment and plan of treatment:	use oxygen supplement to keep pulse ox>90  Secondary Diagnosis:	Chronic deep vein thrombosis (DVT) of lower extremity, unspecified laterality, unspecified vein  Assessment and plan of treatment:	continue anticoagulation  monitor for  bleeding  monitor CBC  Secondary Diagnosis:	Dysphagia  Assessment and plan of treatment:	continue dysphagia diet with aspiration precaution  Secondary Diagnosis:	Aspiration pneumonia  Assessment and plan of treatment:	Condition resolved  Secondary Diagnosis:	Wound of right lower extremity  Assessment and plan of treatment:	continue with silvadene to right thigh wound and betadine to knee eschar Principal Discharge DX:	Sepsis, due to unspecified organism  Goal:	resolve infection  Assessment and plan of treatment:	Continue Dapto monotherapy targeting MRSA, day 32/42   Follow CBC/dif weekly  After Intravenous course is completed we can offer her po MCN for antibiotic suppression, 100 mg bid  Secondary Diagnosis:	SOB (shortness of breath)  Assessment and plan of treatment:	use oxygen supplement to keep pulse ox>90  Secondary Diagnosis:	Chronic deep vein thrombosis (DVT) of lower extremity, unspecified laterality, unspecified vein  Assessment and plan of treatment:	continue anticoagulation  monitor for  bleeding  monitor CBC  Secondary Diagnosis:	Dysphagia  Assessment and plan of treatment:	continue dysphagia diet with aspiration precaution  Secondary Diagnosis:	Aspiration pneumonia  Assessment and plan of treatment:	Condition resolved  Secondary Diagnosis:	Wound of right lower extremity  Assessment and plan of treatment:	continue with silvadene to right thigh wound and betadine to knee eschar Principal Discharge DX:	Sepsis, due to unspecified organism  Goal:	resolve infection  Assessment and plan of treatment:	Continue Dapto monotherapy targeting MRSA, day 32/42   Follow CBC/dif weekly  After Intravenous course is completed we can offer her po MCN for antibiotic suppression, 100 mg bid  Secondary Diagnosis:	SOB (shortness of breath)  Assessment and plan of treatment:	use oxygen supplement to keep pulse ox>90  Secondary Diagnosis:	Chronic deep vein thrombosis (DVT) of lower extremity, unspecified laterality, unspecified vein  Assessment and plan of treatment:	continue anticoagulation  monitor for  bleeding  monitor CBC  Secondary Diagnosis:	Dysphagia  Assessment and plan of treatment:	continue dysphagia diet with aspiration precaution  Secondary Diagnosis:	Wound of right lower extremity  Assessment and plan of treatment:	Condition resolved  Secondary Diagnosis:	Pneumonia  Assessment and plan of treatment:	continue with silvadene to right thigh wound and betadine to knee eschar Principal Discharge DX:	Sepsis, due to unspecified organism  Goal:	resolve infection  Assessment and plan of treatment:	Continue Daptomycin therapy targeting MRSA, day 34/42 .  Follow CBC/dif weekly  After Intravenous course is completed, may need po Minocycline for antibiotic suppression, 100 mg bid  Secondary Diagnosis:	SOB (shortness of breath)  Assessment and plan of treatment:	H/O Asthma: continue with Symbicort, Spiriva  Secondary Diagnosis:	Chronic deep vein thrombosis (DVT) of lower extremity, unspecified laterality, unspecified vein  Assessment and plan of treatment:	continue anticoagulation  monitor for bleeding  monitor CBC  Secondary Diagnosis:	Dysphagia  Assessment and plan of treatment:	continue dysphagia diet with aspiration precaution  Secondary Diagnosis:	Wound of right lower extremity  Assessment and plan of treatment:	/Hematoma  Continue with silvadene, cover with Telfa to right thigh wound.   Apply betadine to knee eschar three times a day  Secondary Diagnosis:	Pneumonia  Assessment and plan of treatment:	Condition resolved  Secondary Diagnosis:	Supratherapeutic INR  Assessment and plan of treatment:	Improved  Continue with coumadin and follow up with your doctor for inr check Principal Discharge DX:	Sepsis, due to unspecified organism  Goal:	resolve infection  Assessment and plan of treatment:	Continue Daptomycin therapy targeting MRSA, day ---/42 .  Follow CBC/dif weekly  After Intravenous course is completed, may need po Minocycline for antibiotic suppression, 100 mg bid  Secondary Diagnosis:	SOB (shortness of breath)  Assessment and plan of treatment:	H/O Asthma: continue with Symbicort, Spiriva  Secondary Diagnosis:	Chronic deep vein thrombosis (DVT) of lower extremity, unspecified laterality, unspecified vein  Assessment and plan of treatment:	continue anticoagulation  monitor for bleeding  monitor CBC  Secondary Diagnosis:	Dysphagia  Assessment and plan of treatment:	continue dysphagia diet with aspiration precaution  Secondary Diagnosis:	Wound of right lower extremity  Assessment and plan of treatment:	/Hematoma  Continue with silvadene, cover with Telfa to right thigh wound.   Apply betadine to knee eschar three times a day  Secondary Diagnosis:	Pneumonia  Assessment and plan of treatment:	Condition resolved  Secondary Diagnosis:	Supratherapeutic INR  Assessment and plan of treatment:	Improved  Continue with coumadin and follow up with your doctor for inr check Principal Discharge DX:	Sepsis, due to unspecified organism  Goal:	resolve infection  Assessment and plan of treatment:	Continue Daptomycin therapy targeting MRSA, day ---/42 .  Follow CBC/dif weekly  After Intravenous course is completed, may need po Minocycline for antibiotic suppression, 100 mg bid  Secondary Diagnosis:	SOB (shortness of breath)  Assessment and plan of treatment:	H/O Asthma: continue with Symbicort, Spiriva  Secondary Diagnosis:	Chronic deep vein thrombosis (DVT) of lower extremity, unspecified laterality, unspecified vein  Assessment and plan of treatment:	continue anticoagulation  monitor for bleeding  monitor CBC  Secondary Diagnosis:	Dysphagia  Assessment and plan of treatment:	continue dysphagia diet with aspiration precaution  Secondary Diagnosis:	Wound of right lower extremity  Assessment and plan of treatment:	wound vac  Secondary Diagnosis:	Pneumonia  Assessment and plan of treatment:	Condition resolved  Secondary Diagnosis:	Supratherapeutic INR  Assessment and plan of treatment:	Improved  Continue with coumadin and follow up with your doctor for inr check Principal Discharge DX:	Sepsis, due to unspecified organism  Goal:	resolve infection  Assessment and plan of treatment:	completed Daptomycin ; on minocycline for suppression therapy per ID;  Follow CBC/dif weekly  Secondary Diagnosis:	SOB (shortness of breath)  Assessment and plan of treatment:	H/O Asthma: continue with Symbicort, Spiriva  Secondary Diagnosis:	Chronic deep vein thrombosis (DVT) of lower extremity, unspecified laterality, unspecified vein  Assessment and plan of treatment:	continue anticoagulation  monitor for bleeding  monitor CBC  Secondary Diagnosis:	Dysphagia  Assessment and plan of treatment:	continue dysphagia diet with aspiration precaution  Secondary Diagnosis:	Wound of right lower extremity  Assessment and plan of treatment:	right thigh wound vac- change 3 times a week  Secondary Diagnosis:	Pneumonia  Assessment and plan of treatment:	Condition resolved  Secondary Diagnosis:	Supratherapeutic INR  Assessment and plan of treatment:	Improved  Continue with coumadin and follow up with your doctor for inr check Principal Discharge DX:	Sepsis, due to unspecified organism  Goal:	resolve infection  Assessment and plan of treatment:	completed Daptomycin ; on minocycline for suppression therapy per ID;  Follow CBC/dif weekly  Secondary Diagnosis:	SOB (shortness of breath)  Assessment and plan of treatment:	H/O Asthma: continue with bronchodilator  Secondary Diagnosis:	Chronic deep vein thrombosis (DVT) of lower extremity, unspecified laterality, unspecified vein  Assessment and plan of treatment:	continue anticoagulation  monitor for bleeding  monitor PT/INR  Secondary Diagnosis:	Dysphagia  Assessment and plan of treatment:	continue mechanical soft diet  with aspiration precaution  Secondary Diagnosis:	Wound of right lower extremity  Assessment and plan of treatment:	right thigh wound vac- change 3 times a week  Secondary Diagnosis:	Pneumonia  Assessment and plan of treatment:	Condition resolved  Secondary Diagnosis:	Supratherapeutic INR  Goal:	resolved  Assessment and plan of treatment:	Continue with coumadin and monitor INR to keep between 2-3

## 2018-04-11 NOTE — PROGRESS NOTE ADULT - SUBJECTIVE AND OBJECTIVE BOX
no new event, more interactive today  pain controlled    Physical Exam  NAD, interactive  RLE:  Dressing and navarro in place, removed for inspection  area of eschar at medial distal aspect of wound, no purulence or drainage  medial groin/thigh erythema near area where tourniquet was applied, not fluctuant, firm  +ehl/fhl/ta/gs function  SILT L4-S1  Dp/pt pulse intact  Compartments soft

## 2018-04-11 NOTE — PROGRESS NOTE ADULT - ASSESSMENT
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   per ID: complete 6 weeks of iv daptomycin (end 5/5) and then prolonged po keflex and minocycline, local wound care per plastics     #Fever/sepsis- source likely infected wound/hematoma medial thigh  vs ct chest : multifocal pna    Recent septic Rt TKR- abx spacer exchange and complex wound closure   Supratherapeutic INR/coagulopathy   PMR on chr steroids    Plan  Cont Vanco and Aztreonam,   bld cx ngtd,   ct c/a/p  reviewed as above pna-pt asymptomatic  ID c/s appreciated    s/p 1dose vitamin K, INR decreased to 2   will rpt INR now-if remains <2.5 will start heparin gtt as pt is on coumadin for DVT as well as Mech MVR  heme c/s appreciated  Rheum c/s given hx of PMR on steroids and pt with fever, elev esr,crp  Surgery , ortho and plastics c/s appreciated f/u recs  will obtain Ultrasound Rt thigh hematoma evaluation

## 2018-04-11 NOTE — DISCHARGE NOTE ADULT - MEDICATION SUMMARY - MEDICATIONS TO TAKE
I will START or STAY ON the medications listed below when I get home from the hospital:    R knee Coffee  -- Dx: R knee joint infection  -- Indication: For right knee care    aspirin 81 mg oral delayed release tablet  -- 1 tab(s) by mouth once a day  -- Indication: For Antiplatelet    acetaminophen 325 mg oral tablet  -- 2 tab(s) by mouth every 6 hours, As needed, Mild Pain (1 - 3)  -- Indication: For Pain    HYDROmorphone 4 mg oral tablet  -- 1 tab(s) by mouth every 4 hours, As needed, Severe Pain (7 - 10)  -- Indication: For Pain    HYDROmorphone 2 mg oral tablet  -- 1 tab(s) by mouth every 4 hours, As needed, Moderate Pain (4 - 6)  -- Indication: For Pain    gabapentin 100 mg oral capsule  -- 2 cap(s) by mouth 3 times a day  -- Indication: For Pain    nystatin 100,000 units/mL oral suspension  -- 5 milliliter(s) by mouth every 6 hours  -- Indication: For Skin care    simvastatin 20 mg oral tablet  -- 1 tab(s) by mouth once a day (at bedtime)  -- Indication: For lipids lowering    povidone iodine 10% topical solution  -- 1 application on skin every 12 hours  -- Indication: For Skin care- right knee eschar    sodium hypochlorite 0.125% topical solution  -- 1 application on skin once a day  -- Indication: For left thigh wound care    sodium hypochlorite 0.125% topical solution  -- 1 application on skin 2 times a day  -- Indication: For Skin care- left thigh wound    metoprolol  -- 12.5 milligram(s) by mouth 2 times a day  -- Indication: For Cardiac    tiotropium 18 mcg inhalation capsule  -- 1 cap(s) inhaled once a day  -- Indication: For bronchodilator    Advair Diskus 250 mcg-50 mcg inhalation powder  -- 1 puff(s) inhaled 2 times a day  -- Indication: For bronchodilator    zinc oxide 20% topical ointment  -- 1 application on skin once a day  -- Indication: For Skin care    clotrimazole-betamethasone dipropionate 1%-0.05% topical cream  -- 1 application on skin 2 times a day  -- Indication: For Skin care    mupirocin 2% topical ointment  -- 1 application on skin 2 times a day  -- Indication: For Skin care-open lesion    lidocaine 2% topical gel with applicator  -- 1 application on skin once a day, As needed, dressing change  -- Indication: For Pain    epoetin jenni  -- 83542 unit(s) subcutaneous once a week  -- Indication: For Anemia    ferrous sulfate 324 mg (65 mg elemental iron) oral tablet  -- 1 tab(s) by mouth 2 times a day with meals  -- Indication: For Anemia    docusate sodium 100 mg oral capsule  -- 1 cap(s) by mouth 3 times a day  -- Indication: For Stool softner    senna oral tablet  -- 2 tab(s) by mouth once a day (at bedtime), As needed, Constipation  -- Indication: For laxative    bisacodyl 10 mg rectal suppository  -- 1 suppository(ies) rectally once a day, As needed, Constipation  -- Indication: For laxative    polyethylene glycol 3350 oral powder for reconstitution  -- 17 gram(s) by mouth once a day  -- Indication: For laxative    melatonin 3 mg oral tablet  -- 1 tab(s) by mouth once a day (at bedtime)  -- Indication: For to help with Sleep    pantoprazole 40 mg oral delayed release tablet  -- 1 tab(s) by mouth once a day (1/2 hour before breakfast)  -- Indication: For For stomach reflux    minocycline 100 mg oral capsule  -- 1 cap(s) by mouth 2 times a day  -- Indication: For Antibiotic    calcium-vitamin D 500 mg-200 intl units oral tablet  -- 1 tab(s) by mouth 3 times a day  -- Indication: For Supplement    Multiple Vitamins oral tablet  -- 1 tab(s) by mouth once a day  -- Indication: For vitamin    Vitamin C 500 mg oral capsule  -- 1 cap(s) by mouth 2 times a day with ferrous sulfate  -- Indication: For vitamin    folic acid 1 mg oral tablet  -- 1 tab(s) by mouth once a day  -- Indication: For vitamin    ascorbic acid 500 mg oral tablet  -- 1 tab(s) by mouth once a day  -- Indication: For vitamin I will START or STAY ON the medications listed below when I get home from the hospital:    R knee [place]  -- Dx: R knee joint infection  -- Indication: For right knee care    aspirin 81 mg oral delayed release tablet  -- 1 tab(s) by mouth once a day  -- Indication: For Antiplatelet    acetaminophen 325 mg oral tablet  -- 2 tab(s) by mouth every 6 hours, As needed, Mild Pain (1 - 3)  -- Indication: For Pain    HYDROmorphone 4 mg oral tablet  -- 1 tab(s) by mouth every 4 hours, As needed, Severe Pain (7 - 10)  -- Indication: For Pain    HYDROmorphone 2 mg oral tablet  -- 1 tab(s) by mouth every 4 hours, As needed, Moderate Pain (4 - 6)  -- Indication: For Pain    Coumadin 1 mg oral tablet  -- 0.5 tab(s) by mouth once a day (at bedtime)  adjust dose to keep INR between 2-3  -- Indication: For Afib    gabapentin 100 mg oral capsule  -- 2 cap(s) by mouth 3 times a day  -- Indication: For Pain    nystatin 100,000 units/mL oral suspension  -- 5 milliliter(s) by mouth every 6 hours  -- Indication: For Skin care    simvastatin 20 mg oral tablet  -- 1 tab(s) by mouth once a day (at bedtime)  -- Indication: For lipids lowering    povidone iodine 10% topical solution  -- 1 application on skin every 12 hours  -- Indication: For Skin care- right knee eschar    sodium hypochlorite 0.125% topical solution  -- 1 application on skin once a day  -- Indication: For left thigh wound care    metoprolol  -- 12.5 milligram(s) by mouth 2 times a day  -- Indication: For Cardiac    Advair Diskus 250 mcg-50 mcg inhalation powder  -- 1 puff(s) inhaled 2 times a day  -- Indication: For bronchodilator    tiotropium 18 mcg inhalation capsule  -- 1 cap(s) inhaled once a day  -- Indication: For bronchodilator    lidocaine 2% topical gel with applicator  -- 1 application on skin once a day, As needed, dressing change  -- Indication: For Pain    zinc oxide 20% topical ointment  -- 1 application on skin once a day  -- Indication: For Skin care    clotrimazole-betamethasone dipropionate 1%-0.05% topical cream  -- 1 application on skin 2 times a day  -- Indication: For Skin care    mupirocin 2% topical ointment  -- 1 application on skin 2 times a day  -- Indication: For Skin care-open lesion    epoetin jenni  -- 45743 unit(s) subcutaneous once a week  -- Indication: For Anemia    ferrous sulfate 324 mg (65 mg elemental iron) oral tablet  -- 1 tab(s) by mouth once a day  -- Indication: For Anemia    bisacodyl 10 mg rectal suppository  -- 1 suppository(ies) rectally once a day, As needed, Constipation  -- Indication: For laxative    docusate sodium 100 mg oral capsule  -- 1 cap(s) by mouth 3 times a day  -- Indication: For Stool softner    senna oral tablet  -- 2 tab(s) by mouth once a day (at bedtime), As needed, Constipation  -- Indication: For laxative    polyethylene glycol 3350 oral powder for reconstitution  -- 17 gram(s) by mouth once a day  -- Indication: For laxative    melatonin 3 mg oral tablet  -- 1 tab(s) by mouth once a day (at bedtime)  -- Indication: For to help with Sleep    pantoprazole 40 mg oral delayed release tablet  -- 1 tab(s) by mouth once a day (1/2 hour before breakfast)  -- Indication: For For stomach reflux    minocycline 100 mg oral capsule  -- 1 cap(s) by mouth 2 times a day  -- Indication: For Antibiotic    calcium-vitamin D 500 mg-200 intl units oral tablet  -- 1 tab(s) by mouth 3 times a day  -- Indication: For Supplement    Multiple Vitamins oral tablet  -- 1 tab(s) by mouth once a day  -- Indication: For vitamin    folic acid 1 mg oral tablet  -- 1 tab(s) by mouth once a day  -- Indication: For vitamin    ascorbic acid 500 mg oral tablet  -- 1 tab(s) by mouth once a day  -- Indication: For vitamin

## 2018-04-11 NOTE — DISCHARGE NOTE ADULT - PROVIDER TOKENS
TOKEN:'[id number] TOKEN:'[id number]:'[id number] TOKEN:'[id number]'

## 2018-04-11 NOTE — DISCHARGE NOTE ADULT - PATIENT PORTAL LINK FT
You can access the [facility] Patient Portal, offered by [facility], by registering with the following website: [email or web address]

## 2018-04-11 NOTE — PROGRESS NOTE ADULT - ASSESSMENT
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    She returned to rehab on iv Dapto, but sent back with fever, leukocytosis and found to have multifocal pneumonia.   Now vanco and aztreonam- low grade fever, feeling better, Cxs negative  Surgery input noted: thigh hematoma, no intervention    Plan:  Continue present Vanco + Aztreo directed at pneumonia and MRSA R knee infex- likely 7 days total, followed by switch back to Dapto alone  Follow temps and CBC/diff   D/w pt and  at bedside

## 2018-04-11 NOTE — DISCHARGE NOTE ADULT - ADDITIONAL INSTRUCTIONS
Follow up with Orthopedic(Dr. Bass) in 2 weeks after discharge.  PT to help mobilize, she must remain 50% wb on the RLE and NO KNEE MOTION allowed, knee immobilizer if oob Follow up with Orthopedic(Dr. Bass) in 2 weeks after discharge.  Pt must remain 50% weight bearing on the Right Lower Extremity and NO KNEE MOTION allowed, knee immobilizer when out of bed  Encourage pt to spend majority of time out of bed in bedside chair as able Follow up with Orthopedic(Dr. Bass) in 2 weeks after discharge.  Pt must remain 50% weight bearing on the Right Lower Extremity and NO KNEE MOTION allowed, knee immobilizer when out of bed  Encourage pt to spend majority of time out of bed in bedside chair wound vac on abdomen and right thigh with y connection - change 3 times a week  Follow up with Orthopedic(Dr. Bass) in 2 weeks after discharge.  follow up by plastics on wound care  Pt must remain 50% weight bearing on the Right Lower Extremity and NO KNEE MOTION allowed, knee immobilizer when out of bed  Encourage pt to spend majority of time out of bed in bedside chair wound vac on abdomen and right thigh with y connection - change 3 times a week  Follow up with Orthopedic(Dr. Bass) in 2 weeks after discharge.  follow up by plastics on wound care  wound care follow up- seen by Dr. Joseph  Rt Thigh & ABdominal wall wound - VAC per Wound PT & plastics  Lt & Rt thigh wounds - pack w/ Dakins  Only infer Lt lateral thigh wound aquacel  Small eschars- cavilon    Pt must remain 50% weight bearing on the Right Lower Extremity and NO KNEE MOTION allowed, knee immobilizer when out of bed  Encourage pt to spend majority of time out of bed in bedside chair wound vac on abdomen and right thigh with y connection - change 3 times a week  Follow up with Orthopedic(Dr. Bass) in 2 weeks after discharge.  follow up by plastics on wound care  wound care follow up- seen by Dr. Joseph  Rt Thigh & ABdominal wall wound - VAC per Wound PT & plastics  Lt & Rt thigh wounds - pack w/ Dakins  Only infer Lt lateral thigh wound aquacel  Small eschars- cavilon    Pt must remain 50% weight bearing on the Right Lower Extremity and NO KNEE MOTION allowed, knee immobilizer when out of bed  Encourage pt to spend majority of time out of bed in bedside chair  PICC line care wound vac on abdomen and right thigh with y connection - change 3 times a week  Follow up with Orthopedic(Dr. Bass) in 2 weeks after discharge.  follow up by plastics on wound care  wound care follow up- seen by Dr. Joseph  Rt Thigh & ABdominal wall wound - VAC per Wound PT & plastics  Lt & Rt thigh wounds - pack w/ Dakins  Only infer Lt lateral thigh wound aquacel  Small eschars- cavilon    Pt must remain 50% weight bearing on the Right Lower Extremity and NO KNEE MOTION allowed, knee immobilizer when out of bed  Encourage pt to spend majority of time out of bed in bedside chair  PICC line care- picc line in place- difficult iv access for blood draw/ iv access if needed- eval by md at rehab

## 2018-04-11 NOTE — DISCHARGE NOTE ADULT - SECONDARY DIAGNOSIS.
SOB (shortness of breath) Chronic deep vein thrombosis (DVT) of lower extremity, unspecified laterality, unspecified vein Dysphagia Aspiration pneumonia Wound of right lower extremity Pneumonia Supratherapeutic INR

## 2018-04-11 NOTE — DISCHARGE NOTE ADULT - PLAN OF CARE
resolve infection s/p R knee abx spacer  treatment with antibiotic use oxygen supplement to keep pulse ox>90 continue anticoagulation  monitor for  bleeding  monitor CBC continue dysphagia diet with aspiration precaution s/p R knee abx spacer  treatment with antibiotic asper ID Condition resolved continue with silvadene to right thigh wound and betadine to knee eschar Continue Dapto monotherapy targeting MRSA, day 32/42   Follow CBC/dif weekly  After Intravenous course is completed we can offer her po MCN for antibiotic suppression, 100 mg bid Continue Daptomycin therapy targeting MRSA, day 34/42 .  Follow CBC/dif weekly  After Intravenous course is completed, may need po Minocycline for antibiotic suppression, 100 mg bid H/O Asthma: continue with Symbicort, Spiriva continue anticoagulation  monitor for bleeding  monitor CBC /Hematoma  Continue with silvadene, cover with Telfa to right thigh wound.   Apply betadine to knee eschar three times a day Improved  Continue with coumadin and follow up with your doctor for inr check Continue Daptomycin therapy targeting MRSA, day ---/42 .  Follow CBC/dif weekly  After Intravenous course is completed, may need po Minocycline for antibiotic suppression, 100 mg bid wound vac completed Daptomycin ; on minocycline for suppression therapy per ID;  Follow CBC/dif weekly right thigh wound vac- change 3 times a week H/O Asthma: continue with bronchodilator continue anticoagulation  monitor for bleeding  monitor PT/INR continue mechanical soft diet  with aspiration precaution resolved Continue with coumadin and monitor INR to keep between 2-3

## 2018-04-11 NOTE — PROGRESS NOTE ADULT - SUBJECTIVE AND OBJECTIVE BOX
CC: f/u for pneumonia and R knee MRSA septic arthritis    Patient reports feeling better, no SOB or cough    REVIEW OF SYSTEMS:  All other review of systems negative (Comprehensive ROS)    Antimicrobials Day # 4/18 total  aztreonam  IVPB 2000 milliGRAM(s) IV Intermittent every 8 hours  clotrimazole Lozenge 1 Lozenge Oral five times a day  vancomycin  IVPB 1000 milliGRAM(s) IV Intermittent every 12 hours    Other Medications Reviewed    T(F): 100.4 (04-11-18 @ 14:00), Max: 100.6 (04-10-18 @ 17:39)  HR: 90 (04-11-18 @ 14:00)  BP: 135/71 (04-11-18 @ 14:00)  RR: 18 (04-11-18 @ 14:00)  SpO2: 98% (04-11-18 @ 14:00)  Wt(kg): --    PHYSICAL EXAM:  General: alert, no acute distress  Eyes:  anicteric, no conjunctival injection, no discharge  Oropharynx: no lesions or injection 	  Neck: supple, without adenopathy  Lungs:  coarse b/l BSs  Heart: regular rate and rhythm; no murmur, rubs or gallops  Abdomen: soft, nondistended, nontender, without mass or organomegaly  Navarrete  Skin: no lesions  Extremities: dependent LE edema, medial thigh hematomas, R knee immobilizer.  R PICC site clean  Neurologic: alert, oriented, moves all extremities    LAB RESULTS:                        7.7    12.81 )-----------( 239      ( 11 Apr 2018 07:27 )             25.1     04-11    132<L>  |  96  |  18  ----------------------------<  108<H>  4.1   |  24  |  0.88    Ca    8.5      11 Apr 2018 06:31          MICROBIOLOGY:  RECENT CULTURES:  04-08 @ 21:46 .Blood Blood-Peripheral     No growth to date.      04-08 @ 21:44 .Urine Catheterized     <10,000 CFU/ml  Normal Urogenital roopa present      RADIOLOGY REVIEWED:  CT Abdomen and Pelvis No Cont (04.09.18 @ 22:31) >  Multifocal pneumonia.    No acute intra-abdominal pathology.    No evidence of retroperitoneal hematoma. Small subcutaneous hematoma   within the left lower anterior abdominal wall.

## 2018-04-11 NOTE — DISCHARGE NOTE ADULT - MEDICATION SUMMARY - MEDICATIONS TO STOP TAKING
I will STOP taking the medications listed below when I get home from the hospital:    oxyCODONE 5 mg oral tablet  -- 1 tab(s) by mouth every 4 hours, As needed, Moderate Pain (4 - 6)    DAPTOmycin 500 mg intravenous injection  -- 900 milligram(s) intravenous every 24 hours. GIve through 5/5/2018. Followed by suppressive therapy of minocycline and/or Keflex. I will STOP taking the medications listed below when I get home from the hospital:    oxyCODONE 5 mg oral tablet  -- 1 tab(s) by mouth every 4 hours, As needed, Moderate Pain (4 - 6)    DAPTOmycin 500 mg intravenous injection  -- 900 milligram(s) intravenous every 24 hours. GIve through 5/5/2018. Followed by suppressive therapy of minocycline and/or Keflex.    cyclobenzaprine 5 mg oral tablet  -- 1 tab(s) by mouth 2 times a day

## 2018-04-11 NOTE — PROGRESS NOTE ADULT - ATTENDING COMMENTS
as above-  Pulm relatively stable-atelectasis, prior PE, asthma, cardiac Dz  Inhaler regimen as outlined above  DVT rx /prophylaxis  ID follow up of ABX; PT evaluation and Psych evaluation    Everett Kumar MD-Pulmonary   [phone] as above-  Pulm relatively stable-atelectasis, prior PE, asthma, cardiac Dz; abnormal CT-likely represents aspiration PNA  Inhaler regimen as outlined above; Sp and Sw evaluation--all meals in chair etc  DVT rx /prophylaxis  ID follow up of ABX; PT evaluation and Psych evaluation    Everett Kumar MD-Pulmonary   [phone]

## 2018-04-11 NOTE — DISCHARGE NOTE ADULT - MEDICATION SUMMARY - MEDICATIONS TO CHANGE
I will SWITCH the dose or number of times a day I take the medications listed below when I get home from the hospital:    Coumadin 2 mg oral tablet  -- 1 tab(s) by mouth once a day (at bedtime). Check PT/INR in the AM 4/5/18.    acetaminophen 325 mg oral tablet  -- 3 tab(s) = 975 mg  by mouth every 8 hours around the clock I will SWITCH the dose or number of times a day I take the medications listed below when I get home from the hospital:    Vitamin C 500 mg oral capsule  -- 1 cap(s) by mouth 2 times a day with ferrous sulfate    ferrous sulfate 324 mg (65 mg elemental iron) oral tablet  -- 1 tab(s) by mouth 2 times a day with meals    Coumadin 2 mg oral tablet  -- 1 tab(s) by mouth once a day (at bedtime). Check PT/INR in the AM 4/5/18.    acetaminophen 325 mg oral tablet  -- 3 tab(s) = 975 mg  by mouth every 8 hours around the clock

## 2018-04-11 NOTE — CHART NOTE - NSCHARTNOTEFT_GEN_A_CORE
informed by RN pt with temp 100.4  Patient is a 77y old  Female who presents with a chief complaint of fever (11 Apr 2018 14:25)  pt is alert and oriented 2-3;  denies any CP/SOB; no chills      Vital Signs Last 24 Hrs  T(C): 38 (11 Apr 2018 14:00), Max: 38.1 (10 Apr 2018 17:39)  T(F): 100.4 (11 Apr 2018 14:00), Max: 100.6 (10 Apr 2018 17:39)  HR: 90 (11 Apr 2018 14:00) (79 - 105)  BP: 135/71 (11 Apr 2018 14:00) (109/64 - 135/71)  BP(mean): --  RR: 18 (11 Apr 2018 14:00) (17 - 18)  SpO2: 98% (11 Apr 2018 14:00) (93% - 99%)                        7.7    12.81 )-----------( 239      ( 11 Apr 2018 07:27 )             25.1     04-11    132<L>  |  96  |  18  ----------------------------<  108<H>  4.1   |  24  |  0.88    Ca    8.5      11 Apr 2018 06:31      PT/INR - ( 11 Apr 2018 07:25 )   PT: 23.7 sec;   INR: 2.07 ratio         PTT - ( 10 Apr 2018 09:40 )  PTT:66.0 sec    alert oriented x2-3  Respiratory:  clear, poor effort  CV: RRR, S1S2,   Abdominal: Soft, NT, obese, bowel sounds present  Extremities: right  knee brace, the medial thigh swollen, erythematous,  edema Left LE  A/P    76yo f PMH including HLD, GERD, Anxiety, Anemia, CAD s/p HERBERT, severe AS (s/p TAVR & PPM 12/17 [employer] Model [id number]), 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    complete 6 weeks of iv daptomycin (end 5/5) and then prolonged po keflex and minocycline, local wound care per plastics     #Fever/sepsis- source likely infected wound/hematoma medial thigh  vs ct chest : multifocal pna    Recent septic Rt TKR- abx spacer exchange and complex wound closure   Supra therapeutic INR- resolved   now with fever- likely secondary to above reason; Id on board;  blood cx from 4/8- negative;  per d/w md, continue iv antibiotics- on iv vanco/aztreaunun; tylenol for fever; no blood/urine cx now;  monitor;   d/w pt/

## 2018-04-11 NOTE — PROGRESS NOTE ADULT - SUBJECTIVE AND OBJECTIVE BOX
Pt seen and examined. Doing well. No acute events o/n.     T(C): 36.7 (04-11-18 @ 04:26), Max: 38.1 (04-10-18 @ 17:39)  T(F): 98.1 (04-11-18 @ 04:26), Max: 100.6 (04-10-18 @ 17:39)  HR: 82 (04-11-18 @ 04:26) (79 - 105)  BP: 118/65 (04-11-18 @ 04:26) (109/64 - 129/66)  RR: 18 (04-11-18 @ 04:26) (17 - 20)  SpO2: 98% (04-11-18 @ 04:26) (93% - 99%)      10 Apr 2018 07:01  -  11 Apr 2018 07:00  --------------------------------------------------------  IN:    IV PiggyBack: 350 mL    Oral Fluid: 960 mL  Total IN: 1310 mL    OUT:    Indwelling Catheter - Urethral: 425 mL  Total OUT: 425 mL    Total NET: 885 mL          Exam:  Stable erythema of proximal RLE  Stable eschar medial to knee incision  Incision stable  No expressible fluid                          7.5    13.5  )-----------( 224      ( 10 Apr 2018 22:49 )             23.1     04-11    132<L>  |  96  |  18  ----------------------------<  108<H>  4.1   |  24  |  0.88    Ca    8.5      11 Apr 2018 06:31        Plan:

## 2018-04-12 LAB
ANION GAP SERPL CALC-SCNC: 11 MMOL/L — SIGNIFICANT CHANGE UP (ref 5–17)
APTT BLD: 81.7 SEC — HIGH (ref 27.5–37.4)
BASOPHILS # BLD AUTO: 0.04 K/UL — SIGNIFICANT CHANGE UP (ref 0–0.2)
BASOPHILS NFR BLD AUTO: 0.3 % — SIGNIFICANT CHANGE UP (ref 0–2)
BUN SERPL-MCNC: 18 MG/DL — SIGNIFICANT CHANGE UP (ref 7–23)
CALCIUM SERPL-MCNC: 8.4 MG/DL — SIGNIFICANT CHANGE UP (ref 8.4–10.5)
CHLORIDE SERPL-SCNC: 96 MMOL/L — SIGNIFICANT CHANGE UP (ref 96–108)
CO2 SERPL-SCNC: 25 MMOL/L — SIGNIFICANT CHANGE UP (ref 22–31)
CREAT SERPL-MCNC: 0.77 MG/DL — SIGNIFICANT CHANGE UP (ref 0.5–1.3)
EOSINOPHIL # BLD AUTO: 0.34 K/UL — SIGNIFICANT CHANGE UP (ref 0–0.5)
EOSINOPHIL NFR BLD AUTO: 2.3 % — SIGNIFICANT CHANGE UP (ref 0–6)
GLUCOSE SERPL-MCNC: 111 MG/DL — HIGH (ref 70–99)
HCT VFR BLD CALC: 22.1 % — LOW (ref 34.5–45)
HGB BLD-MCNC: 7.6 G/DL — LOW (ref 11.5–15.5)
IMM GRANULOCYTES NFR BLD AUTO: 1 % — SIGNIFICANT CHANGE UP (ref 0–1.5)
INR BLD: 1.47 RATIO — HIGH (ref 0.88–1.16)
LYMPHOCYTES # BLD AUTO: 1.41 K/UL — SIGNIFICANT CHANGE UP (ref 1–3.3)
LYMPHOCYTES # BLD AUTO: 9.7 % — LOW (ref 13–44)
MCHC RBC-ENTMCNC: 29.7 PG — SIGNIFICANT CHANGE UP (ref 27–34)
MCHC RBC-ENTMCNC: 34.4 GM/DL — SIGNIFICANT CHANGE UP (ref 32–36)
MCV RBC AUTO: 86.3 FL — SIGNIFICANT CHANGE UP (ref 80–100)
MONOCYTES # BLD AUTO: 1.1 K/UL — HIGH (ref 0–0.9)
MONOCYTES NFR BLD AUTO: 7.5 % — SIGNIFICANT CHANGE UP (ref 2–14)
NEUTROPHILS # BLD AUTO: 11.55 K/UL — HIGH (ref 1.8–7.4)
NEUTROPHILS NFR BLD AUTO: 79.2 % — HIGH (ref 43–77)
PLATELET # BLD AUTO: 247 K/UL — SIGNIFICANT CHANGE UP (ref 150–400)
POTASSIUM SERPL-MCNC: 3.5 MMOL/L — SIGNIFICANT CHANGE UP (ref 3.5–5.3)
POTASSIUM SERPL-SCNC: 3.5 MMOL/L — SIGNIFICANT CHANGE UP (ref 3.5–5.3)
PROTHROM AB SERPL-ACNC: 16.7 SEC — HIGH (ref 10–13.1)
RBC # BLD: 2.56 M/UL — LOW (ref 3.8–5.2)
RBC # FLD: 16.8 % — HIGH (ref 10.3–14.5)
SODIUM SERPL-SCNC: 132 MMOL/L — LOW (ref 135–145)
VANCOMYCIN TROUGH SERPL-MCNC: 16 UG/ML — SIGNIFICANT CHANGE UP (ref 10–20)
WBC # BLD: 14.58 K/UL — HIGH (ref 3.8–10.5)
WBC # FLD AUTO: 14.58 K/UL — HIGH (ref 3.8–10.5)

## 2018-04-12 PROCEDURE — 99233 SBSQ HOSP IP/OBS HIGH 50: CPT

## 2018-04-12 PROCEDURE — 99232 SBSQ HOSP IP/OBS MODERATE 35: CPT

## 2018-04-12 PROCEDURE — 76882 US LMTD JT/FCL EVL NVASC XTR: CPT | Mod: 26,RT

## 2018-04-12 RX ORDER — WARFARIN SODIUM 2.5 MG/1
2 TABLET ORAL ONCE
Qty: 0 | Refills: 0 | Status: COMPLETED | OUTPATIENT
Start: 2018-04-12 | End: 2018-04-12

## 2018-04-12 RX ADMIN — WARFARIN SODIUM 2 MILLIGRAM(S): 2.5 TABLET ORAL at 22:21

## 2018-04-12 RX ADMIN — Medication 325 MILLIGRAM(S): at 13:20

## 2018-04-12 RX ADMIN — Medication 1 TABLET(S): at 05:14

## 2018-04-12 RX ADMIN — Medication 500 MILLIGRAM(S): at 13:20

## 2018-04-12 RX ADMIN — TIOTROPIUM BROMIDE 1 CAPSULE(S): 18 CAPSULE ORAL; RESPIRATORY (INHALATION) at 13:20

## 2018-04-12 RX ADMIN — Medication 1 LOZENGE: at 13:22

## 2018-04-12 RX ADMIN — HEPARIN SODIUM 2100 UNIT(S)/HR: 5000 INJECTION INTRAVENOUS; SUBCUTANEOUS at 05:53

## 2018-04-12 RX ADMIN — Medication 1 TABLET(S): at 22:21

## 2018-04-12 RX ADMIN — Medication 100 MILLIGRAM(S): at 09:14

## 2018-04-12 RX ADMIN — Medication 1 MILLIGRAM(S): at 13:20

## 2018-04-12 RX ADMIN — OXYCODONE HYDROCHLORIDE 5 MILLIGRAM(S): 5 TABLET ORAL at 18:30

## 2018-04-12 RX ADMIN — Medication 100 MILLIGRAM(S): at 17:28

## 2018-04-12 RX ADMIN — Medication 1 TABLET(S): at 13:20

## 2018-04-12 RX ADMIN — Medication 250 MILLIGRAM(S): at 17:29

## 2018-04-12 RX ADMIN — Medication 250 MILLIGRAM(S): at 05:15

## 2018-04-12 RX ADMIN — POLYETHYLENE GLYCOL 3350 17 GRAM(S): 17 POWDER, FOR SOLUTION ORAL at 13:21

## 2018-04-12 RX ADMIN — PANTOPRAZOLE SODIUM 40 MILLIGRAM(S): 20 TABLET, DELAYED RELEASE ORAL at 05:16

## 2018-04-12 RX ADMIN — Medication 10 MILLIGRAM(S): at 10:59

## 2018-04-12 RX ADMIN — Medication 1 TABLET(S): at 13:21

## 2018-04-12 RX ADMIN — BUDESONIDE AND FORMOTEROL FUMARATE DIHYDRATE 2 PUFF(S): 160; 4.5 AEROSOL RESPIRATORY (INHALATION) at 17:29

## 2018-04-12 RX ADMIN — Medication 1 LOZENGE: at 16:41

## 2018-04-12 RX ADMIN — Medication 100 MILLIGRAM(S): at 13:21

## 2018-04-12 RX ADMIN — Medication 100 MILLIGRAM(S): at 05:14

## 2018-04-12 RX ADMIN — BUDESONIDE AND FORMOTEROL FUMARATE DIHYDRATE 2 PUFF(S): 160; 4.5 AEROSOL RESPIRATORY (INHALATION) at 05:16

## 2018-04-12 RX ADMIN — OXYCODONE HYDROCHLORIDE 5 MILLIGRAM(S): 5 TABLET ORAL at 17:29

## 2018-04-12 RX ADMIN — Medication 100 MILLIGRAM(S): at 22:21

## 2018-04-12 RX ADMIN — Medication 81 MILLIGRAM(S): at 13:20

## 2018-04-12 NOTE — PHYSICAL THERAPY INITIAL EVALUATION ADULT - PLANNED THERAPY INTERVENTIONS, PT EVAL
strengthening/transfer training/balance training/bed mobility training/gait training balance training/strengthening/transfer training/bed mobility training

## 2018-04-12 NOTE — PROGRESS NOTE ADULT - ASSESSMENT
77 year-old woman with known history of coronary artery disease status post PCI to LAD (2016), severe AS status post TAVR (2016), bradycardia requiring PPM ([employer]), DVT and PE on anticoagulation with warfarin, goal INR is 2.0 - 3.0.  PPM interrogation noted. Device functioning normally. Patient is not pacemaker dependent.    Patient does not have mechanical mitral valve. Please continue Heparin gtt as bridge to warfarin for patient at high risk for venous thromboembolic disease.  Goal INR 2.0 - 3.0.    Monitor H/H.  Please address nutritional status as most recent albumin was 1.6 g/dL.    Appreciate ID input regarding antibiotic choice and duration. If patient has persistent fevers, leukocytosis, and/or positive blood cultures, would pursue ERIKA to evaluate PPM and prosthetic aortic valve for infective endocarditis.    Will follow for cardiology at Roverto Roblero's request.

## 2018-04-12 NOTE — SWALLOW BEDSIDE ASSESSMENT ADULT - SWALLOW EVAL: DIAGNOSIS
Limited assessment 2/2 pt refusal and reduced arousal. Based on limited assessment for one trial of Nepro nutritional supplement drink via straw, pt with reduced A-P transport with oral residue post swallow and delay in trigger of the pharyngeal swallow. Pt refusing further trials reporting she is tired and does not wish to proceed with evaluation at this time. Limited assessment 2/2 pt refusal and reduced arousal. Based on limited assessment for one trial of Nepro nutritional supplement drink via straw, pt with reduced A-P transport with oral residue post swallow and delay in trigger of the pharyngeal swallow. Pt refusing further trials reporting she is tired and does not wish to proceed with evaluation at this time. Pt/HHA educated re: role of slp and rationale for swallow assessment.

## 2018-04-12 NOTE — PROGRESS NOTE ADULT - PROBLEM SELECTOR PLAN 9
abnormal CT--c/w aspiration  Sp and Sw evaluation--aspiration precautions--ABX as per ID abnormal CT--c/w aspiration  Sp and Sw evaluation--aspiration precautions--ABX as per ID-total of 7 d overlap then daptomycin alone

## 2018-04-12 NOTE — PROGRESS NOTE ADULT - SUBJECTIVE AND OBJECTIVE BOX
No acute events overnight. Pain well controlled with pain medications. INR supratherapeutic, medicine aware.    Vital Signs Last 24 Hrs  T(C): 37.2 (12 Apr 2018 04:15), Max: 38 (11 Apr 2018 14:00)  T(F): 98.9 (12 Apr 2018 04:15), Max: 100.4 (11 Apr 2018 14:00)  HR: 87 (12 Apr 2018 04:15) (76 - 90)  BP: 129/62 (12 Apr 2018 04:15) (115/69 - 147/66)  BP(mean): --  RR: 18 (12 Apr 2018 04:15) (18 - 20)  SpO2: 98% (12 Apr 2018 04:15) (98% - 98%)    Exam:  Gen: NAD, erythema and swelling on medial, proximal thigh  Motor: wiggles toes  Sensory: SILT DP/SP/S/S/T nerve distributions  Vascular: 2+ Dorsalis Pedis pulse  Dressing: Clean, Dry, Intact in [place] brace    A/P: 77 year old female s/p R knee stage 1 revision  Pain Control  50% PWB in brace, no knee flexion allowed, may remove brace while in bed  PT/OT, oob to chair  Abx per ID  Wound care per plastics team  DVT ppx, Cards recommending coumadin, Dr. Bass would prefer other dvt ppx other than coumadin as patient as unreliable po intake  Med.Cards/Pulm/ID care appreciated  Will continue to follow No acute events overnight. Pain well controlled with pain medications. INR supratherapeutic, medicine aware.    Vital Signs Last 24 Hrs  T(C): 37.2 (12 Apr 2018 04:15), Max: 38 (11 Apr 2018 14:00)  T(F): 98.9 (12 Apr 2018 04:15), Max: 100.4 (11 Apr 2018 14:00)  HR: 87 (12 Apr 2018 04:15) (76 - 90)  BP: 129/62 (12 Apr 2018 04:15) (115/69 - 147/66)  BP(mean): --  RR: 18 (12 Apr 2018 04:15) (18 - 20)  SpO2: 98% (12 Apr 2018 04:15) (98% - 98%)    Exam:  Gen: NAD, erythema and swelling on medial, proximal thigh  Motor: wiggles toes  Sensory: SILT DP/SP/S/S/T nerve distributions  Vascular: 2+ Dorsalis Pedis pulse  Dressing: Clean, Dry, Intact in [place] brace    A/P: 77 year old female s/p R knee stage 1 revision  Pain Control  50% PWB in brace, no knee flexion allowed, may remove brace while in bed  PT/OT, oob to chair  Abx per ID  Wound care per plastics team  DVT ppx per medicine/cards	  Med.Cards/Pulm/ID care appreciated  Will continue to follow

## 2018-04-12 NOTE — PROGRESS NOTE ADULT - ASSESSMENT
Patient with infected right tkr s/p rudy, spacer for strept infection, had poor wound healing so returned 3 months after and had spacer exchange and complex wound closure with mrsa infection found. She is about 2 weeks into dapto and returns with fever, leukocytosis, ongoing pain and eschar of right knee wound and what appears to be a hematoma of the right thigh. INR has been up so that is good reason for the thigh findings. Must consider infected hematoma possible. UTI is possible. Pneumonia is possible given basilar rales. retroperitoneal hematoma possible .  PMR decompensation or adrenal insufficiency a consideration at well.    Pulmonary stable relative to prior admits   CT abnormal--this suggests aspiration and not active PNA w/paucity of clinical signs and sx (ABX should cover her for both infections)

## 2018-04-12 NOTE — PHYSICAL THERAPY INITIAL EVALUATION ADULT - STRENGTHENING, PT EVAL
GOAL: Patient will demonstrate3+/5 strength t/o to improve performance and safety of standing activities, transfers and ambulation in 4 weeks GOAL: Patient will demonstrate 3+/5 strength t/o to improve performance and safety of standing activities, transfers and ambulation in 4 weeks

## 2018-04-12 NOTE — PHYSICAL THERAPY INITIAL EVALUATION ADULT - BED MOBILITY TRAINING, PT EVAL
GOAL: Patient will be able to transfer supine to sit/sit to supine independently in 4 weeks GOAL: Patient will be able to transfer supine to sit/sit to supine min assist x 1 in 4 weeks

## 2018-04-12 NOTE — PROGRESS NOTE ADULT - SUBJECTIVE AND OBJECTIVE BOX
CC: f/u for pneumonia and R knee MRSA septic arthritis    Patient reports comfortable, no SOB, still weak in general, thigh swelling less    REVIEW OF SYSTEMS:  All other review of systems negative (Comprehensive ROS)    Antimicrobials Day # 5/19 total  aztreonam  IVPB 2000 milliGRAM(s) IV Intermittent every 8 hours  clotrimazole Lozenge 1 Lozenge Oral five times a day  vancomycin  IVPB 1000 milliGRAM(s) IV Intermittent every 12 hours    Other Medications Reviewed    Vital Signs Last 24 Hrs  T(F): 98.9 (12 Apr 2018 04:15), Max: 100.4 (11 Apr 2018 14:00)  HR: 87 (12 Apr 2018 04:15) (76 - 90)  BP: 129/62 (12 Apr 2018 04:15) (115/69 - 147/66)  BP(mean): --  RR: 18 (12 Apr 2018 04:15) (18 - 20)  SpO2: 98% (12 Apr 2018 04:15) (98% - 98%)    PHYSICAL EXAM:  General: alert, no acute distress  Eyes:  anicteric, no conjunctival injection, no discharge  Oropharynx: no lesions or injection 	  Neck: supple, without adenopathy  Lungs:  clear anteriorly  Heart: regular rate and rhythm; no murmur, rubs or gallops  Abdomen: soft, nondistended, nontender, without mass or organomegaly  Navarrete  Skin: no lesions  Extremities: dependent LE edema, L medial thigh edema/hematoma less prominent; still with ecchymosis, shallow blister R upper thigh, R knee immobilizer.  R PICC site clean  Neurologic: alert, oriented, moves all extremities    LAB RESULTS:                        7.6    14.58 )-----------( 247      ( 12 Apr 2018 07:48 )             22.1   04-12    132<L>  |  96  |  18  ----------------------------<  111<H>  3.5   |  25  |  0.77    Ca    8.4      12 Apr 2018 05:15      MICROBIOLOGY:  RECENT CULTURES:  04-08 @ 21:46 .Blood Blood-Peripheral     No growth to date.      04-08 @ 21:44 .Urine Catheterized     <10,000 CFU/ml  Normal Urogenital roopa present      RADIOLOGY REVIEWED:  CT Abdomen and Pelvis No Cont (04.09.18 @ 22:31) >  Multifocal pneumonia.    No acute intra-abdominal pathology.    No evidence of retroperitoneal hematoma. Small subcutaneous hematoma   within the left lower anterior abdominal wall.

## 2018-04-12 NOTE — PROGRESS NOTE ADULT - SUBJECTIVE AND OBJECTIVE BOX
Patient is a 77y old  Female who presents with a chief complaint of fever (11 Apr 2018 14:25)  pt seen and examined at bedside   SUBJECTIVE/OVERNIGHT events noted, rt thigh swelling and pain persists but less intense  Constipation,     Vital Signs Last 24 Hrs  T(C): 37.2 (12 Apr 2018 16:57), Max: 37.7 (12 Apr 2018 00:46)  T(F): 99 (12 Apr 2018 16:57), Max: 99.8 (12 Apr 2018 00:46)  HR: 92 (12 Apr 2018 16:57) (87 - 92)  BP: 126/70 (12 Apr 2018 16:57) (126/70 - 147/66)  BP(mean): --  RR: 18 (12 Apr 2018 16:57) (18 - 20)  SpO2: 95% (12 Apr 2018 16:57) (95% - 98%)  CAPILLARY BLOOD GLUCOSE        MEDICATIONS  (STANDING):  ascorbic acid 500 milliGRAM(s) Oral daily  aspirin enteric coated 81 milliGRAM(s) Oral daily  aztreonam  IVPB 2000 milliGRAM(s) IV Intermittent every 8 hours  buDESOnide 160 MICROgram(s)/formoterol 4.5 MICROgram(s) Inhaler 2 Puff(s) Inhalation two times a day  calcium carbonate 1250 mG + Vitamin D (OsCal 500 + D) 1 Tablet(s) Oral three times a day  clotrimazole Lozenge 1 Lozenge Oral five times a day  docusate sodium 100 milliGRAM(s) Oral three times a day  ferrous    sulfate 325 milliGRAM(s) Oral daily  folic acid 1 milliGRAM(s) Oral daily  heparin  Infusion.  Unit(s)/Hr (21 mL/Hr) IV Continuous <Continuous>  multivitamin 1 Tablet(s) Oral daily  pantoprazole    Tablet 40 milliGRAM(s) Oral before breakfast  polyethylene glycol 3350 17 Gram(s) Oral daily  tiotropium 18 MICROgram(s) Capsule 1 Capsule(s) Inhalation daily  vancomycin  IVPB 1000 milliGRAM(s) IV Intermittent every 12 hours  warfarin 2 milliGRAM(s) Oral once    MEDICATIONS  (PRN):  acetaminophen   Tablet 650 milliGRAM(s) Oral every 6 hours PRN For Temp greater than 38 C (100.4 F)  acetaminophen   Tablet. 650 milliGRAM(s) Oral every 6 hours PRN Moderate Pain (4 - 6)  heparin  Injectable 9500 Unit(s) IV Push every 6 hours PRN For aPTT less than 40  heparin  Injectable 4500 Unit(s) IV Push every 6 hours PRN For aPTT between 40 - 57  oxyCODONE    IR 5 milliGRAM(s) Oral every 4 hours PRN Severe Pain (7 - 10)  senna 2 Tablet(s) Oral at bedtime PRN Constipation    PHYSICAL EXAM  General : comfortable, not in any acute distress  Neck : supple, no LAD, no JVD  Eyes : EOMI, PERRLA, conjunctiva : no icterus, no pallor  MM moist, no pharyngeal erythema/exudates  Pulmonary: clear to auscultation bilateral lung fields, no crackles/rhonchi/wheezing,   CVS : S1 S2,rate normal-,rhythm-regular, no murmurs, no abnormal sounds  Gastrointestinal: soft, non tender,obese  ext : rt knee brace, rt medial thigh swelling, decreased erythema and extending blister  Neuro: AAOx3, no focal deficits  Musculoskeletal:  FROM of all ext  Skin: no rash  LABS                        7.6    14.58 )-----------( 247      ( 12 Apr 2018 07:48 )             22.1     04-12    132<L>  |  96  |  18  ----------------------------<  111<H>  3.5   |  25  |  0.77    Ca    8.4      12 Apr 2018 05:15        RADIOLOGY and IMAGING reviewed: yes < from: US Joint Nonvasc Extremity Limited, Right (04.12.18 @ 12:28) >  No discrete   collection within the right thigh.    < end of copied text >    Consultant notes reviewed : yes  Care discussed with Consultants/other providers :

## 2018-04-12 NOTE — PHYSICAL THERAPY INITIAL EVALUATION ADULT - PRECAUTIONS/LIMITATIONS, REHAB EVAL
S/p  3/23/2018 R knee explantation of previously placed articulating antibiotic spacer, irrigation & debridement of the knee, placement of static antibiotic spacer, w/ a Plastic Surgery soft tissue complex wound closure. At rehab pt was developing worsening weakness. Overall poor appetite. Developed fever/chills yesterday & taken to ED for further work up. Seen by ortho & plastics in ED w/ fever to 103.5 fall precautions/oxygen therapy device and L/min/S/p  3/23/2018 R knee explantation of previously placed articulating antibiotic spacer, irrigation & debridement of the knee, placement of static antibiotic spacer, w/ a Plastic Surgery soft tissue complex wound closure. At rehab pt was developing worsening weakness. Overall poor appetite. Developed fever/chills yesterday & taken to ED for further work up. Seen by ortho & plastics in ED w/ fever to 103.5

## 2018-04-12 NOTE — PHYSICAL THERAPY INITIAL EVALUATION ADULT - BALANCE TRAINING, PT EVAL
GOAL: Patient will demonstrate good balance to improve performance and safety of standing activities, transfers and ambulation in 4 weeks

## 2018-04-12 NOTE — PROGRESS NOTE ADULT - SUBJECTIVE AND OBJECTIVE BOX
HPI:  Patient afebrile x24 hours.  Blood cultures remain negative. Only MRSA seen in wound/tissue from knee.    Review Of Systems:           Respiratory: No shortness of breath, cough, or wheezing  Cardiovascular: No chest pain or palpitations  10 point review of systems is otherwise negative except as mentioned above        Medications:  acetaminophen   Tablet 650 milliGRAM(s) Oral every 6 hours PRN  acetaminophen   Tablet. 650 milliGRAM(s) Oral every 6 hours PRN  ascorbic acid 500 milliGRAM(s) Oral daily  aspirin enteric coated 81 milliGRAM(s) Oral daily  aztreonam  IVPB 2000 milliGRAM(s) IV Intermittent every 8 hours  buDESOnide 160 MICROgram(s)/formoterol 4.5 MICROgram(s) Inhaler 2 Puff(s) Inhalation two times a day  calcium carbonate 1250 mG + Vitamin D (OsCal 500 + D) 1 Tablet(s) Oral three times a day  clotrimazole Lozenge 1 Lozenge Oral five times a day  docusate sodium 100 milliGRAM(s) Oral three times a day  ferrous    sulfate 325 milliGRAM(s) Oral daily  folic acid 1 milliGRAM(s) Oral daily  heparin  Infusion.  Unit(s)/Hr IV Continuous <Continuous>  heparin  Injectable 9500 Unit(s) IV Push every 6 hours PRN  heparin  Injectable 4500 Unit(s) IV Push every 6 hours PRN  multivitamin 1 Tablet(s) Oral daily  oxyCODONE    IR 5 milliGRAM(s) Oral every 4 hours PRN  pantoprazole    Tablet 40 milliGRAM(s) Oral before breakfast  polyethylene glycol 3350 17 Gram(s) Oral daily  senna 2 Tablet(s) Oral at bedtime PRN  tiotropium 18 MICROgram(s) Capsule 1 Capsule(s) Inhalation daily  vancomycin  IVPB 1000 milliGRAM(s) IV Intermittent every 12 hours  warfarin 2 milliGRAM(s) Oral once    PAST MEDICAL & SURGICAL HISTORY:  CAD (coronary artery disease): HERBERT to LAD 2016  Aortic stenosis, severe  Uncomplicated asthma, unspecified asthma severity  Polymyalgia  Lumbar disc disease with radiculopathy  Spider veins  Anxiety  Severe aortic stenosis by prior echocardiogram:  and  2016  Dysphagia  Macular degeneration  Hiatal hernia  GERD (gastroesophageal reflux disease)  Sciatica  Osteoarthritis  S/P TKR (total knee replacement): joint infection s/p explant and abx spacer on 17  S/P TAVR (transcatheter aortic valve replacement): 17  Artificial cardiac pacemaker: 17  H/O cardiac catheterization: 16 HERBERT placed on LAD  H/O endoscopy: with dilatation of esophageal stricture   S/P cataract surgery: x2; 3-4yr ago  S/P gastroplasty: 28 yrs ago  S/P cholecystectomy: more than 15 years ago  S/P total knee replacement: left, 15yr ago  S/P total knee replacement: right, 12yr ago  S/P hysterectomy: 24yr ago    Vitals:  T(C): 37.2 (18 @ 16:57), Max: 37.7 (18 @ 00:46)  HR: 92 (18 @ 16:57) (87 - 92)  BP: 126/70 (18 @ 16:57) (126/70 - 147/66)  BP(mean): --  RR: 18 (18 @ 16:57) (18 - 20)  SpO2: 95% (18 @ 16:57) (95% - 98%)  Wt(kg): --  Daily     Daily Weight in k (2018 04:15)  I&O's Summary    2018 07:  -  2018 07:00  --------------------------------------------------------  IN: 1954 mL / OUT: 1200 mL / NET: 754 mL    2018 07:  -  2018 20:16  --------------------------------------------------------  IN: 1062 mL / OUT: 350 mL / NET: 712 mL        Physical Exam:  Appearance: Obese, NAD  Eyes: PERRLA, EOMI, pale conjunctiva, no scleral icterus   HENT: Normal oral mucosa  Cardiovascular: RRR, S1, S2, no murmur, rub, or gallop; no JVD  Procedural Access Site: Clean, dry, intact, without hematoma - right leg in brace  Respiratory: Clear to auscultation bilaterally anteriorly  Gastrointestinal: Soft, non-tender, non-distended, BS+  Musculoskeletal: No clubbing or joint deformity   Neurologic: No focal weakness  Lymphatic: No lymphadenopathy  Psychiatry: AAOx3 with appropriate mood and affect  Skin: No rashes, ecchymoses, or cyanosis                        7.6    14.58 )-----------( 247      ( 2018 07:48 )             22.1         132<L>  |  96  |  18  ----------------------------<  111<H>  3.5   |  25  |  0.77    Ca    8.4      2018 05:15      PT/INR - ( 2018 07:54 )   PT: 16.7 sec;   INR: 1.47 ratio         PTT - ( 2018 05:15 )  PTT:81.7 sec

## 2018-04-12 NOTE — PROGRESS NOTE ADULT - ASSESSMENT
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    She returned to rehab on IV Dapto, but sent back with fever, leukocytosis and found to have multifocal pneumonia.   Now vanco and aztreonam- low grade fever resolved, Cxs negative  Dependent edema and thigh hematoma, no surgical intervention    Plan:  Continue present Vanco (trough acceptable at 16) + Aztreo directed at pneumonia and MRSA R knee infex- another 48 hrs, followed by switch back to Dapto alone  Follow temps and CBC/diff   D/w pt and aide at bedside

## 2018-04-12 NOTE — PHYSICAL THERAPY INITIAL EVALUATION ADULT - MANUAL MUSCLE TESTING RESULTS, REHAB EVAL
grossly 3/5 t/o extremities except RLE in KI grossly 3/5 t/o extremities except RLE in KI and LLE 2-/5

## 2018-04-12 NOTE — PHYSICAL THERAPY INITIAL EVALUATION ADULT - ACTIVE RANGE OF MOTION EXAMINATION, REHAB EVAL
bilateral  lower extremity Active ROM was WFL (within functional limits)/bilateral upper extremity Active ROM was WFL (within functional limits)/except RLE in KI bilateral  lower extremity Active ROM was WFL (within functional limits)/bilateral upper extremity Active ROM was WFL (within functional limits)/except RLE in KI and LLE knee and hip flexion 0-70

## 2018-04-12 NOTE — PROGRESS NOTE ADULT - SUBJECTIVE AND OBJECTIVE BOX
CHIEF COMPLAINT:    Interval Events:    REVIEW OF SYSTEMS:  Constitutional: No fevers or chills. No weight loss. No fatigue or generalized malaise.  Eyes: No itching or discharge from the eyes  ENT: No ear pain. No ear discharge. No nasal congestion. No post nasal drip. No epistaxis. No throat pain. No sore throat. No difficulty swallowing.   CV: No chest pain. No palpitations. No lightheadedness or dizziness.   Resp: No dyspnea at rest. No dyspnea on exertion. No orthopnea. No wheezing. No cough. No stridor. No sputum production. No chest pain with respiration.  GI: No nausea. No vomiting. No diarrhea.  MSK: No joint pain or pain in any extremities  Integumentary: No skin lesions. No pedal edema.  Neurological: No gross motor weakness. No sensory changes.  [ ] All other systems negative  [ ] Unable to assess ROS because ________    OBJECTIVE:  ICU Vital Signs Last 24 Hrs  T(C): 37.2 (12 Apr 2018 04:15), Max: 38 (11 Apr 2018 14:00)  T(F): 98.9 (12 Apr 2018 04:15), Max: 100.4 (11 Apr 2018 14:00)  HR: 87 (12 Apr 2018 04:15) (76 - 90)  BP: 129/62 (12 Apr 2018 04:15) (115/69 - 147/66)  BP(mean): --  ABP: --  ABP(mean): --  RR: 18 (12 Apr 2018 04:15) (18 - 20)  SpO2: 98% (12 Apr 2018 04:15) (98% - 98%)        04-10 @ 07:01 - 04-11 @ 07:00  --------------------------------------------------------  IN: 1310 mL / OUT: 425 mL / NET: 885 mL    04-11 @ 07:01 - 04-12 @ 04:55  --------------------------------------------------------  IN: 1212 mL / OUT: 750 mL / NET: 462 mL      CAPILLARY BLOOD GLUCOSE          PHYSICAL EXAM:  General: Awake, alert, oriented X 3.   HEENT: Atraumatic, normocephalic.                 Mallampatti Grade                 No nasal congestion.                No tonsillar or pharyngeal exudates.  Lymph Nodes: No palpable lymphadenopathy  Neck: No JVD. No carotid bruit.   Respiratory: Normal chest expansion                         Normal percussion                         Normal and equal air entry                         No wheeze, rhonchi or rales.  Cardiovascular: S1 S2 normal. No murmurs, rubs or gallops.   Abdomen: Soft, non-tender, non-distended. No organomegaly.  Extremities: Warm to touch. Peripheral pulse palpable. No pedal edema.   Skin: No rashes or skin lesions  Neurological: Motor and sensory examination equal and normal in all four extremities.  Psychiatry: Appropriate mood and affect.    HOSPITAL MEDICATIONS:  MEDICATIONS  (STANDING):  ascorbic acid 500 milliGRAM(s) Oral daily  aspirin enteric coated 81 milliGRAM(s) Oral daily  aztreonam  IVPB 2000 milliGRAM(s) IV Intermittent every 8 hours  buDESOnide 160 MICROgram(s)/formoterol 4.5 MICROgram(s) Inhaler 2 Puff(s) Inhalation two times a day  calcium carbonate 1250 mG + Vitamin D (OsCal 500 + D) 1 Tablet(s) Oral three times a day  clotrimazole Lozenge 1 Lozenge Oral five times a day  docusate sodium 100 milliGRAM(s) Oral three times a day  ferrous    sulfate 325 milliGRAM(s) Oral daily  folic acid 1 milliGRAM(s) Oral daily  heparin  Infusion.  Unit(s)/Hr (21 mL/Hr) IV Continuous <Continuous>  multivitamin 1 Tablet(s) Oral daily  pantoprazole    Tablet 40 milliGRAM(s) Oral before breakfast  polyethylene glycol 3350 17 Gram(s) Oral daily  tiotropium 18 MICROgram(s) Capsule 1 Capsule(s) Inhalation daily  vancomycin  IVPB 1000 milliGRAM(s) IV Intermittent every 12 hours    MEDICATIONS  (PRN):  acetaminophen   Tablet 650 milliGRAM(s) Oral every 6 hours PRN For Temp greater than 38 C (100.4 F)  acetaminophen   Tablet. 650 milliGRAM(s) Oral every 6 hours PRN Moderate Pain (4 - 6)  heparin  Injectable 9500 Unit(s) IV Push every 6 hours PRN For aPTT less than 40  heparin  Injectable 4500 Unit(s) IV Push every 6 hours PRN For aPTT between 40 - 57  oxyCODONE    IR 5 milliGRAM(s) Oral every 4 hours PRN Severe Pain (7 - 10)  senna 2 Tablet(s) Oral at bedtime PRN Constipation      LABS:                        7.6    15.9  )-----------( 240      ( 11 Apr 2018 22:57 )             23.6     04-11    132<L>  |  96  |  18  ----------------------------<  108<H>  4.1   |  24  |  0.88    Ca    8.5      11 Apr 2018 06:31      PT/INR - ( 11 Apr 2018 15:10 )   PT: 17.2 sec;   INR: 1.58 ratio         PTT - ( 11 Apr 2018 22:57 )  PTT:86.9 sec          MICROBIOLOGY:     RADIOLOGY:  [ ] Reviewed and interpreted by me    Point of Care Ultrasound Findings:    PFT:    EKG: CHIEF COMPLAINT: fatigued--no cough or sob    Interval Events: ID, careds and rheum f/up    REVIEW OF SYSTEMS:  Constitutional: No fevers or chills. No weight loss. + fatigue or generalized malaise.  Eyes: No itching or discharge from the eyes  ENT: No ear pain. No ear discharge. No nasal congestion. No post nasal drip. No epistaxis. No throat pain. No sore throat. No difficulty swallowing.   CV: No chest pain. No palpitations. No lightheadedness or dizziness.   Resp: No dyspnea at rest. + dyspnea on exertion. No orthopnea. No wheezing. No cough. No stridor. No sputum production. No chest pain with respiration.  GI: No nausea. No vomiting. No diarrhea.  MSK: No joint pain or pain in any extremities  Integumentary: No skin lesions. + pedal edema.  Neurological: No gross motor weakness. No sensory changes.  [+ ] All other systems negative  [ ] Unable to assess ROS because ________    OBJECTIVE:  ICU Vital Signs Last 24 Hrs  T(C): 37.2 (12 Apr 2018 04:15), Max: 38 (11 Apr 2018 14:00)  T(F): 98.9 (12 Apr 2018 04:15), Max: 100.4 (11 Apr 2018 14:00)  HR: 87 (12 Apr 2018 04:15) (76 - 90)  BP: 129/62 (12 Apr 2018 04:15) (115/69 - 147/66)  BP(mean): --  ABP: --  ABP(mean): --  RR: 18 (12 Apr 2018 04:15) (18 - 20)  SpO2: 98% (12 Apr 2018 04:15) (98% - 98%)        04-10 @ 07:01  -  04-11 @ 07:00  --------------------------------------------------------  IN: 1310 mL / OUT: 425 mL / NET: 885 mL    04-11 @ 07:01 - 04-12 @ 04:55  --------------------------------------------------------  IN: 1212 mL / OUT: 750 mL / NET: 462 mL      CAPILLARY BLOOD GLUCOSE          PHYSICAL EXAM: NAD in bed w/O2 in place  General: Awake, alert, oriented X 3.   HEENT: Atraumatic, normocephalic.                 Mallampatti Grade 3                 No nasal congestion.                No tonsillar or pharyngeal exudates.  Lymph Nodes: No palpable lymphadenopathy  Neck: No JVD. No carotid bruit.   Respiratory: abnormal chest expansion-reduced                         Normal percussion                         Normal and equal air entry                         No wheeze, rhonchi or rales.  Cardiovascular: S1 S2 normal. No murmurs, rubs or gallops.   Abdomen: Soft, non-tender, non-distended. No organomegaly.  Extremities: Warm to touch. Peripheral pulse palpable. + pedal edema. right leg wrapped  Skin: No rashes or skin lesions  Neurological: Motor and sensory examination equal and normal in all four extremities.  Psychiatry: Appropriate mood and affect.    HOSPITAL MEDICATIONS:  MEDICATIONS  (STANDING):  ascorbic acid 500 milliGRAM(s) Oral daily  aspirin enteric coated 81 milliGRAM(s) Oral daily  aztreonam  IVPB 2000 milliGRAM(s) IV Intermittent every 8 hours  buDESOnide 160 MICROgram(s)/formoterol 4.5 MICROgram(s) Inhaler 2 Puff(s) Inhalation two times a day  calcium carbonate 1250 mG + Vitamin D (OsCal 500 + D) 1 Tablet(s) Oral three times a day  clotrimazole Lozenge 1 Lozenge Oral five times a day  docusate sodium 100 milliGRAM(s) Oral three times a day  ferrous    sulfate 325 milliGRAM(s) Oral daily  folic acid 1 milliGRAM(s) Oral daily  heparin  Infusion.  Unit(s)/Hr (21 mL/Hr) IV Continuous <Continuous>  multivitamin 1 Tablet(s) Oral daily  pantoprazole    Tablet 40 milliGRAM(s) Oral before breakfast  polyethylene glycol 3350 17 Gram(s) Oral daily  tiotropium 18 MICROgram(s) Capsule 1 Capsule(s) Inhalation daily  vancomycin  IVPB 1000 milliGRAM(s) IV Intermittent every 12 hours    MEDICATIONS  (PRN):  acetaminophen   Tablet 650 milliGRAM(s) Oral every 6 hours PRN For Temp greater than 38 C (100.4 F)  acetaminophen   Tablet. 650 milliGRAM(s) Oral every 6 hours PRN Moderate Pain (4 - 6)  heparin  Injectable 9500 Unit(s) IV Push every 6 hours PRN For aPTT less than 40  heparin  Injectable 4500 Unit(s) IV Push every 6 hours PRN For aPTT between 40 - 57  oxyCODONE    IR 5 milliGRAM(s) Oral every 4 hours PRN Severe Pain (7 - 10)  senna 2 Tablet(s) Oral at bedtime PRN Constipation      LABS:                        7.6    15.9  )-----------( 240      ( 11 Apr 2018 22:57 )             23.6     04-11    132<L>  |  96  |  18  ----------------------------<  108<H>  4.1   |  24  |  0.88    Ca    8.5      11 Apr 2018 06:31      PT/INR - ( 11 Apr 2018 15:10 )   PT: 17.2 sec;   INR: 1.58 ratio         PTT - ( 11 Apr 2018 22:57 )  PTT:86.9 sec          MICROBIOLOGY:     RADIOLOGY:  [ ] Reviewed and interpreted by me    Point of Care Ultrasound Findings:    PFT:    EKG:

## 2018-04-12 NOTE — PHYSICAL THERAPY INITIAL EVALUATION ADULT - TRANSFER TRAINING, PT EVAL
GOAL: Patient will be able to transfer sit to stand/stand to sit independently in 4 weeks GOAL: Patient will be able to transfer sit to stand/stand to sit min assist x 1 in 4 weeks

## 2018-04-12 NOTE — PHYSICAL THERAPY INITIAL EVALUATION ADULT - ADDITIONAL COMMENTS
Pt lives on the 18th floor of an apartment building, + elevators. Patient has a private HHA 12hrs/7 days a week. Patient lives with  and uses a rolling walker to ambulate inside and outside of her apartment building Pt lives on the 18th floor of an apartment building, + elevators. Patient has a private HHA 24hrs/7 days a week. Patient lives with  and used a rolling walker to ambulate inside and outside of her apartment building prior to her R TKA. Per HHA report, patient uses w/c to get around and uses Prudencio lift when transferring patient.

## 2018-04-12 NOTE — PROGRESS NOTE ADULT - ASSESSMENT
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   per ID: complete 6 weeks of iv daptomycin (end 5/5) and then prolonged po keflex and minocycline, local wound care per plastics     #Fever/sepsis- source likely multifocal pna vs thigh wound infection    Recent septic Rt TKR- abx spacer exchange and plastics complex wound closure   Supratherapeutic INR/coagulopathy- resolved   PMR on chr steroids    Plan  Cont Vanco and Aztreonam,   bld cx ngtd,   ID c/s appreciated    s/p 1dose vitamin K, INR decreased to 1.5   started heparin gtt bridging coumadin for DVT   cards cs appreciated-pt has no mech MVR  ortho f/u appreciated  Ultrasound Rt thigh difficult study but no e/o collection  f/u plastics/surgery

## 2018-04-12 NOTE — PROCEDURE NOTE - ADDITIONAL PROCEDURE DETAILS
call for device interrogation to check for arrhythmias  normal sensing and pacing thresholds , stable lead impedences   no stored episodes since last interrogation done on March 25, 2018  normal device function     [id number]

## 2018-04-12 NOTE — PROGRESS NOTE ADULT - ATTENDING COMMENTS
as above-  Pulm relatively stable-atelectasis, prior PE, asthma, cardiac Dz; abnormal CT-likely represents aspiration PNA  Inhaler regimen as outlined above; Sp and Sw evaluation--all meals in chair etc  DVT rx /prophylaxis  ID follow up of ABX; PT evaluation and Psych evaluation    Everett Kumar MD-Pulmonary   [phone] as above-  Pulm relatively stable-atelectasis, prior PE, asthma, cardiac Dz; abnormal CT-likely represents aspiration PNA  Inhaler regimen as outlined above; Sp and Sw evaluation--all meals in chair etc  DVT rx /prophylaxis  ID follow up of ABX (7 day overlap then Daptomycin alone at rehab); PT evaluation and Psych evaluation    Everett Kumar MD-Pulmonary   [phone]

## 2018-04-13 LAB
ANION GAP SERPL CALC-SCNC: 11 MMOL/L — SIGNIFICANT CHANGE UP (ref 5–17)
ANISOCYTOSIS BLD QL: SLIGHT — SIGNIFICANT CHANGE UP
APPEARANCE UR: ABNORMAL
APTT BLD: 94.2 SEC — HIGH (ref 27.5–37.4)
BASOPHILS # BLD AUTO: 0.16 K/UL — SIGNIFICANT CHANGE UP (ref 0–0.2)
BASOPHILS NFR BLD AUTO: 0.9 % — SIGNIFICANT CHANGE UP (ref 0–2)
BILIRUB UR-MCNC: NEGATIVE — SIGNIFICANT CHANGE UP
BLASTS # FLD: 0 % — SIGNIFICANT CHANGE UP (ref 0–0)
BUN SERPL-MCNC: 16 MG/DL — SIGNIFICANT CHANGE UP (ref 7–23)
CALCIUM SERPL-MCNC: 8.4 MG/DL — SIGNIFICANT CHANGE UP (ref 8.4–10.5)
CHLORIDE SERPL-SCNC: 94 MMOL/L — LOW (ref 96–108)
CO2 SERPL-SCNC: 25 MMOL/L — SIGNIFICANT CHANGE UP (ref 22–31)
COLOR SPEC: YELLOW — SIGNIFICANT CHANGE UP
CREAT SERPL-MCNC: 0.75 MG/DL — SIGNIFICANT CHANGE UP (ref 0.5–1.3)
CULTURE RESULTS: SIGNIFICANT CHANGE UP
CULTURE RESULTS: SIGNIFICANT CHANGE UP
DIFF PNL FLD: NEGATIVE — SIGNIFICANT CHANGE UP
EOSINOPHIL # BLD AUTO: 0.47 K/UL — SIGNIFICANT CHANGE UP (ref 0–0.5)
EOSINOPHIL NFR BLD AUTO: 2.7 % — SIGNIFICANT CHANGE UP (ref 0–6)
GLUCOSE SERPL-MCNC: 105 MG/DL — HIGH (ref 70–99)
GLUCOSE UR QL: NEGATIVE MG/DL — SIGNIFICANT CHANGE UP
HAPTOGLOB SERPL-MCNC: 317 MG/DL — HIGH (ref 34–200)
HCT VFR BLD CALC: 23.8 % — LOW (ref 34.5–45)
HGB BLD-MCNC: 7.4 G/DL — LOW (ref 11.5–15.5)
HYPOCHROMIA BLD QL: SLIGHT — SIGNIFICANT CHANGE UP
INR BLD: 1.67 RATIO — HIGH (ref 0.88–1.16)
KETONES UR-MCNC: NEGATIVE — SIGNIFICANT CHANGE UP
LDH SERPL L TO P-CCNC: 305 U/L — HIGH (ref 50–242)
LEUKOCYTE ESTERASE UR-ACNC: ABNORMAL
LYMPHOCYTES # BLD AUTO: 1.25 K/UL — SIGNIFICANT CHANGE UP (ref 1–3.3)
LYMPHOCYTES # BLD AUTO: 7.1 % — LOW (ref 13–44)
LYMPHOCYTES # SPEC AUTO: 0 % — SIGNIFICANT CHANGE UP (ref 0–0)
MANUAL SMEAR VERIFICATION: SIGNIFICANT CHANGE UP
MCHC RBC-ENTMCNC: 27.6 PG — SIGNIFICANT CHANGE UP (ref 27–34)
MCHC RBC-ENTMCNC: 31.1 GM/DL — LOW (ref 32–36)
MCV RBC AUTO: 88.8 FL — SIGNIFICANT CHANGE UP (ref 80–100)
METAMYELOCYTES # FLD: 0.9 % — HIGH (ref 0–0)
MONOCYTES # BLD AUTO: 0.32 K/UL — SIGNIFICANT CHANGE UP (ref 0–0.9)
MONOCYTES NFR BLD AUTO: 1.8 % — LOW (ref 2–14)
MYELOCYTES NFR BLD: 0.9 % — HIGH (ref 0–0)
NEUTROPHILS # BLD AUTO: 14.63 K/UL — HIGH (ref 1.8–7.4)
NEUTROPHILS NFR BLD AUTO: 82.3 % — HIGH (ref 43–77)
NEUTS BAND # BLD: 0.9 % — SIGNIFICANT CHANGE UP (ref 0–8)
NITRITE UR-MCNC: NEGATIVE — SIGNIFICANT CHANGE UP
PH UR: 5 — SIGNIFICANT CHANGE UP (ref 5–8)
PLAT MORPH BLD: NORMAL — SIGNIFICANT CHANGE UP
PLATELET # BLD AUTO: 285 K/UL — SIGNIFICANT CHANGE UP (ref 150–400)
POTASSIUM SERPL-MCNC: 3.3 MMOL/L — LOW (ref 3.5–5.3)
POTASSIUM SERPL-SCNC: 3.3 MMOL/L — LOW (ref 3.5–5.3)
PROMYELOCYTES # FLD: 0.9 % — HIGH (ref 0–0)
PROT UR-MCNC: NEGATIVE MG/DL — SIGNIFICANT CHANGE UP
PROTHROM AB SERPL-ACNC: 18.4 SEC — HIGH (ref 9.8–12.7)
RBC # BLD: 2.68 M/UL — LOW (ref 3.8–5.2)
RBC # FLD: 16.7 % — HIGH (ref 10.3–14.5)
RBC BLD AUTO: ABNORMAL
SODIUM SERPL-SCNC: 130 MMOL/L — LOW (ref 135–145)
SP GR SPEC: 1.01 — SIGNIFICANT CHANGE UP (ref 1.01–1.02)
SPECIMEN SOURCE: SIGNIFICANT CHANGE UP
SPECIMEN SOURCE: SIGNIFICANT CHANGE UP
UROBILINOGEN FLD QL: NEGATIVE MG/DL — SIGNIFICANT CHANGE UP
VANCOMYCIN TROUGH SERPL-MCNC: 18.2 UG/ML — SIGNIFICANT CHANGE UP (ref 10–20)
VARIANT LYMPHS # BLD: 1.8 % — SIGNIFICANT CHANGE UP (ref 0–6)
WBC # BLD: 17.59 K/UL — HIGH (ref 3.8–10.5)
WBC # FLD AUTO: 17.59 K/UL — HIGH (ref 3.8–10.5)

## 2018-04-13 PROCEDURE — 99233 SBSQ HOSP IP/OBS HIGH 50: CPT

## 2018-04-13 PROCEDURE — 99232 SBSQ HOSP IP/OBS MODERATE 35: CPT

## 2018-04-13 RX ORDER — WARFARIN SODIUM 2.5 MG/1
2 TABLET ORAL ONCE
Qty: 0 | Refills: 0 | Status: COMPLETED | OUTPATIENT
Start: 2018-04-13 | End: 2018-04-13

## 2018-04-13 RX ORDER — POVIDONE-IODINE 5 %
1 AEROSOL (ML) TOPICAL DAILY
Qty: 0 | Refills: 0 | Status: DISCONTINUED | OUTPATIENT
Start: 2018-04-13 | End: 2018-04-16

## 2018-04-13 RX ADMIN — Medication 650 MILLIGRAM(S): at 05:16

## 2018-04-13 RX ADMIN — Medication 100 MILLIGRAM(S): at 05:16

## 2018-04-13 RX ADMIN — Medication 650 MILLIGRAM(S): at 14:20

## 2018-04-13 RX ADMIN — Medication 1 APPLICATION(S): at 15:47

## 2018-04-13 RX ADMIN — OXYCODONE HYDROCHLORIDE 5 MILLIGRAM(S): 5 TABLET ORAL at 07:41

## 2018-04-13 RX ADMIN — BUDESONIDE AND FORMOTEROL FUMARATE DIHYDRATE 2 PUFF(S): 160; 4.5 AEROSOL RESPIRATORY (INHALATION) at 05:15

## 2018-04-13 RX ADMIN — Medication 1 TABLET(S): at 13:22

## 2018-04-13 RX ADMIN — Medication 100 MILLIGRAM(S): at 21:58

## 2018-04-13 RX ADMIN — Medication 1 LOZENGE: at 09:30

## 2018-04-13 RX ADMIN — Medication 1 TABLET(S): at 21:58

## 2018-04-13 RX ADMIN — Medication 500 MILLIGRAM(S): at 13:22

## 2018-04-13 RX ADMIN — Medication 250 MILLIGRAM(S): at 20:35

## 2018-04-13 RX ADMIN — Medication 650 MILLIGRAM(S): at 13:24

## 2018-04-13 RX ADMIN — Medication 1 MILLIGRAM(S): at 13:21

## 2018-04-13 RX ADMIN — Medication 100 MILLIGRAM(S): at 09:30

## 2018-04-13 RX ADMIN — WARFARIN SODIUM 2 MILLIGRAM(S): 2.5 TABLET ORAL at 21:58

## 2018-04-13 RX ADMIN — Medication 81 MILLIGRAM(S): at 13:22

## 2018-04-13 RX ADMIN — OXYCODONE HYDROCHLORIDE 5 MILLIGRAM(S): 5 TABLET ORAL at 06:41

## 2018-04-13 RX ADMIN — Medication 1 TABLET(S): at 13:26

## 2018-04-13 RX ADMIN — Medication 100 MILLIGRAM(S): at 13:26

## 2018-04-13 RX ADMIN — BUDESONIDE AND FORMOTEROL FUMARATE DIHYDRATE 2 PUFF(S): 160; 4.5 AEROSOL RESPIRATORY (INHALATION) at 18:27

## 2018-04-13 RX ADMIN — HEPARIN SODIUM 2100 UNIT(S)/HR: 5000 INJECTION INTRAVENOUS; SUBCUTANEOUS at 05:36

## 2018-04-13 RX ADMIN — Medication 325 MILLIGRAM(S): at 13:22

## 2018-04-13 RX ADMIN — TIOTROPIUM BROMIDE 1 CAPSULE(S): 18 CAPSULE ORAL; RESPIRATORY (INHALATION) at 13:25

## 2018-04-13 RX ADMIN — Medication 1 TABLET(S): at 05:16

## 2018-04-13 RX ADMIN — Medication 100 MILLIGRAM(S): at 18:25

## 2018-04-13 RX ADMIN — PANTOPRAZOLE SODIUM 40 MILLIGRAM(S): 20 TABLET, DELAYED RELEASE ORAL at 06:40

## 2018-04-13 RX ADMIN — Medication 100 MILLIGRAM(S): at 00:17

## 2018-04-13 RX ADMIN — POLYETHYLENE GLYCOL 3350 17 GRAM(S): 17 POWDER, FOR SOLUTION ORAL at 13:23

## 2018-04-13 RX ADMIN — Medication 250 MILLIGRAM(S): at 06:41

## 2018-04-13 NOTE — PROGRESS NOTE ADULT - ASSESSMENT
Patient with infected right tkr s/p rudy, spacer for strept infection, had poor wound healing so returned 3 months after and had spacer exchange and complex wound closure with mrsa infection found. She is about 2 weeks into dapto and returns with fever, leukocytosis, ongoing pain and eschar of right knee wound and what appears to be a hematoma of the right thigh. INR has been up so that is good reason for the thigh findings. Must consider infected hematoma possible. UTI is possible. Pneumonia is possible given basilar rales. retroperitoneal hematoma possible .  PMR decompensation or adrenal insufficiency a consideration at well.    Pulmonary stable relative to prior admits   CT abnormal--this suggests aspiration and not active PNA w/paucity of clinical signs and sx (ABX should cover her for both infections) Patient with infected right tkr s/p rudy, spacer for strept infection, had poor wound healing so returned 3 months after and had spacer exchange and complex wound closure with mrsa infection found. She is about 2 weeks into dapto and returns with fever, leukocytosis, ongoing pain and eschar of right knee wound and what appears to be a hematoma of the right thigh. INR has been up so that is good reason for the thigh findings. Must consider infected hematoma possible. UTI is possible. Pneumonia is possible given basilar rales. retroperitoneal hematoma possible .  PMR decompensation or adrenal insufficiency a consideration at well.    Pulmonary stable relative to prior admits   CT abnormal--this suggests aspiration and not active PNA w/paucity of clinical signs and sx (ABX should cover her for both infections)    Concern over persistent fevers and lethargy-likely represents an untreated source of infection

## 2018-04-13 NOTE — PROGRESS NOTE ADULT - SUBJECTIVE AND OBJECTIVE BOX
CC: f/u for pneumonia and R knee MRSA septic arthritis    Patient with isolated fever earlier, sweats, more lethargic today    REVIEW OF SYSTEMS:  All other review of systems negative (Comprehensive ROS)    Antimicrobials Day # 6, 20 total  aztreonam  IVPB 2000 milliGRAM(s) IV Intermittent every 8 hours  clotrimazole Lozenge 1 Lozenge Oral five times a day  vancomycin  IVPB 1000 milliGRAM(s) IV Intermittent every 12 hours    Other Medications Reviewed    Vital Signs Last 24 Hrs  T(F): 97 (13 Apr 2018 11:40), Max: 101.1 (13 Apr 2018 05:11)  HR: 73 (13 Apr 2018 11:40) (73 - 92)  BP: 118/66 (13 Apr 2018 11:40) (118/66 - 136/71)  BP(mean): --  RR: 18 (13 Apr 2018 11:40) (18 - 20)  SpO2: 96% (13 Apr 2018 11:40) (95% - 96%)    PHYSICAL EXAM:  General: somnolent, but arouses easily, no acute distress  Eyes:  anicteric, no conjunctival injection, no discharge  Oropharynx: no lesions or injection 	  Neck: supple, without adenopathy  Lungs:  clear anteriorly  Heart: regular rate and rhythm; no murmur, rubs or gallops  Abdomen: soft, nondistended, nontender, without mass or organomegaly  [place]  Skin: no lesions  Extremities: dependent LE edema, L medial thigh edema/hematoma improved; still with ecchymosis, shallow blister R upper thigh, R knee immobilizer.  R PICC site clean  Neurologic: moves all extremities    LAB RESULTS:                        7.4    17.59 )-----------( 285      ( 13 Apr 2018 07:50 )             23.8   04-13    130<L>  |  94<L>  |  16  ----------------------------<  105<H>  3.3<L>   |  25  |  0.75    Ca    8.4      13 Apr 2018 05:12    Vancomycin Level, Trough (04.12.18 @ 05:15)    Vancomycin Level, Trough: 16.0      MICROBIOLOGY:  RECENT CULTURES:  04-08 @ 21:46 .Blood Blood-Peripheral     No growth to date.      04-08 @ 21:44 .Urine Catheterized     <10,000 CFU/ml  Normal Urogenital roopa present      RADIOLOGY REVIEWED:  CT Abdomen and Pelvis No Cont (04.09.18 @ 22:31) >  Multifocal pneumonia.  No acute intra-abdominal pathology.  No evidence of retroperitoneal hematoma. Small subcutaneous hematoma   within the left lower anterior abdominal wall.      US Joint Nonvasc Extremity Limited, Right (04.12.18 @ 12:28) >  Limited examination secondary to patient body habitus. No discrete   collection within the right thigh.

## 2018-04-13 NOTE — PROGRESS NOTE ADULT - ATTENDING COMMENTS
as above-  Pulm relatively stable-atelectasis, prior PE, asthma, cardiac Dz; abnormal CT-likely represents aspiration PNA  Inhaler regimen as outlined above; Sp and Sw evaluation--all meals in chair etc  DVT rx /prophylaxis  ID follow up of ABX (7 day overlap then Daptomycin alone at rehab); PT evaluation and Psych evaluation    Everett Kumar MD-Pulmonary   [phone] as above-  persistent fever and fatigue an issue--?unresolved source of temps (doubt lungs)  Pulm relatively stable-atelectasis, prior PE, asthma, cardiac Dz; abnormal CT-likely represents aspiration PNA  Inhaler regimen as outlined above; Sp and Sw evaluation--all meals in chair etc  DVT rx /prophylaxis  ID follow up of ABX (7 day overlap then Daptomycin alone at rehab)---? Endocarditis--ERIKA   PT evaluation and Psych evaluation    Everett Kumar MD-Pulmonary   [phone]

## 2018-04-13 NOTE — SWALLOW BEDSIDE ASSESSMENT ADULT - ASR SWALLOW ASPIRATION MONITOR
change of breathing pattern/cough/fever/throat clearing/upper respiratory infection/Monitor for s/s aspiration/laryngeal penetration. If noted:  D/C p.o. intake, provide non-oral nutrition/hydration/meds, and contact this service @ [phone]/marcos voice/pneumonia

## 2018-04-13 NOTE — PROGRESS NOTE ADULT - PROBLEM SELECTOR PLAN 4
multifactorial-leg and lung--on ABX as per ID multifactorial-leg and lung--on ABX as per ID  need to reconsider other sources of infection-?endocarditis

## 2018-04-13 NOTE — SWALLOW BEDSIDE ASSESSMENT ADULT - SWALLOW EVAL: DIAGNOSIS
Pt presents with an oral and suspected pharyngeal dysphagia characterized by Pt presents with an oral and suspected pharyngeal dysphagia superimposed upon reduced arousal and characterized by delayed oral transit time, reduced A-P transport, inconsistent delay in trigger of the pharyngeal swallow. Good hyolaryngeal elevation upon palpation. Pt with limited intake of nectar thick liquid and thin liquid. Although no laryngeal penetration/aspiration, given MD concern for aspiration pna, limited intake of PO, and reduced mentation would recommend conservative diet option at this time.

## 2018-04-13 NOTE — PROGRESS NOTE ADULT - PROBLEM SELECTOR PLAN 9
abnormal CT--c/w aspiration  Sp and Sw evaluation--aspiration precautions--ABX as per ID-total of 7 d overlap then daptomycin alone

## 2018-04-13 NOTE — SWALLOW BEDSIDE ASSESSMENT ADULT - ORAL PHASE
Delayed oral transit time Decreased anterior-posterior movement of the bolus/Delayed oral transit time/Palatal stasis/Stasis in anterior sulcus/Lingual stasis Within functional limits

## 2018-04-13 NOTE — PROGRESS NOTE ADULT - ASSESSMENT
77 year-old woman with known history of coronary artery disease status post PCI to LAD (2016), severe AS status post TAVR (2016), bradycardia requiring PPM ([employer]), DVT and PE on anticoagulation with warfarin, goal INR is 2.0 - 3.0.  PPM interrogation noted. Device functioning normally. Patient is not pacemaker dependent.    Patient does not have mechanical mitral valve. Please continue Heparin gtt as bridge to warfarin for patient at high risk for venous thromboembolic disease.  Goal INR 2.0 - 3.0.    Monitor H/H.  Please address nutritional status as most recent albumin was 1.6 g/dL.    Appreciate ID input regarding antibiotic choice and duration. If patient has persistent fevers, leukocytosis, and/or positive blood cultures, would pursue ERIKA to evaluate PPM and prosthetic aortic valve for infective endocarditis.    I will be covered by Swapnil Edwards MD for the weekend. Please call him with any further cardiovascular issues.

## 2018-04-13 NOTE — SWALLOW BEDSIDE ASSESSMENT ADULT - PHARYNGEAL PHASE
Within functional limits Delayed pharyngeal swallow intermittent delay in trigger of pharyngeal swallow

## 2018-04-13 NOTE — PROGRESS NOTE ADULT - SUBJECTIVE AND OBJECTIVE BOX
Orthopaedic Surgery Progress Note    Subjective: Febrile overnight, fever workup. No CP, no SOB.       Objective:  T(C): 38.4 (04-13-18 @ 05:11), Max: 38.4 (04-13-18 @ 05:11)  HR: 92 (04-13-18 @ 05:11) (89 - 92)  BP: 126/73 (04-13-18 @ 05:11) (126/70 - 136/71)  RR: 18 (04-13-18 @ 05:11) (18 - 20)  SpO2: 96% (04-13-18 @ 05:11) (95% - 98%)  Wt(kg): --    04-12 @ 07:01  -  04-13 @ 07:00  --------------------------------------------------------  IN: 1062 mL / OUT: 350 mL / NET: 712 mL        PE  Exam:  Gen: NAD, erythema and swelling on medial, proximal thigh, covered with dressing  Motor: wiggles toes  Sensory: SILT DP/SP/S/S/T nerve distributions  Vascular: 2+ Dorsalis Pedis pulse  Dressing: Changed by plastic this AM. In Andie brace                          7.6    14.58 )-----------( 247      ( 12 Apr 2018 07:48 )             22.1     04-13    130<L>  |  94<L>  |  16  ----------------------------<  105<H>  3.3<L>   |  25  |  0.75    Ca    8.4      13 Apr 2018 05:12      PT/INR - ( 13 Apr 2018 05:14 )   PT: 18.4 sec;   INR: 1.67 ratio         PTT - ( 13 Apr 2018 05:14 )  PTT:94.2 sec        A/P: 77 year old female s/p R knee stage 1 revision  Pain Control  50% PWB in brace, no knee flexion allowed, may remove brace while in bed, brace to stay at least until outpatient follow-up  PT/OT, oob to chair  Abx per ID, f/u fever workup  Wound care per plastics team  DVT ppx per medicine/cards	  Med.Cards/Pulm/ID care appreciated  Will continue to follow

## 2018-04-13 NOTE — SWALLOW BEDSIDE ASSESSMENT ADULT - SWALLOW EVAL: ORAL MUSCULATURE
anomalies present/+ ulcerations noted on hard palate anomalies present/+ ulcerations noted on hard palate->NP aware

## 2018-04-13 NOTE — PROGRESS NOTE ADULT - ASSESSMENT
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   per ID: complete 6 weeks of iv daptomycin (end 5/5) and then prolonged po keflex and minocycline, local wound care per plastics     #Fever/sepsis- source likely multifocal pna vs prox thigh wound infection    Recent septic Rt TKR- abx spacer exchange and plastics complex wound closure   Supratherapeutic INR/coagulopathy- resolved   PMR on chr steroids    Plan  Pt continues to develop high grade fevers on abx  Cont Vanco and Aztreonam,   bld cx ngtd, f/u rpt bcx  ID f/u    s/p 1dose vitamin K, INR decreased to 1.5   started heparin gtt bridging coumadin for DVT   cards cs appreciated-[facility] ? need for ERIKA-r/o endocarditis  Pulm c/s appreciate  Ultrasound Rt thigh difficult study but no e/o collection  f/u plastics/surgery: wound care  f/u rheum

## 2018-04-13 NOTE — PROGRESS NOTE ADULT - SUBJECTIVE AND OBJECTIVE BOX
HPI:  Ongoing low grade temperatures, negative blood cultures.    Review Of Systems:           Constitutional: +fever, sweating  Respiratory: No shortness of breath, cough, or wheezing  Cardiovascular: No chest pain or palpitations  10 point review of systems is otherwise negative except as mentioned above        Medications:  acetaminophen   Tablet 650 milliGRAM(s) Oral every 6 hours PRN  acetaminophen   Tablet. 650 milliGRAM(s) Oral every 6 hours PRN  ascorbic acid 500 milliGRAM(s) Oral daily  aspirin enteric coated 81 milliGRAM(s) Oral daily  aztreonam  IVPB 2000 milliGRAM(s) IV Intermittent every 8 hours  buDESOnide 160 MICROgram(s)/formoterol 4.5 MICROgram(s) Inhaler 2 Puff(s) Inhalation two times a day  calcium carbonate 1250 mG + Vitamin D (OsCal 500 + D) 1 Tablet(s) Oral three times a day  clotrimazole Lozenge 1 Lozenge Oral five times a day  docusate sodium 100 milliGRAM(s) Oral three times a day  ferrous    sulfate 325 milliGRAM(s) Oral daily  folic acid 1 milliGRAM(s) Oral daily  heparin  Infusion.  Unit(s)/Hr IV Continuous <Continuous>  heparin  Injectable 9500 Unit(s) IV Push every 6 hours PRN  heparin  Injectable 4500 Unit(s) IV Push every 6 hours PRN  multivitamin 1 Tablet(s) Oral daily  oxyCODONE    IR 5 milliGRAM(s) Oral every 4 hours PRN  pantoprazole    Tablet 40 milliGRAM(s) Oral before breakfast  polyethylene glycol 3350 17 Gram(s) Oral daily  povidone iodine 10% Swab 1 Application(s) Topical daily  senna 2 Tablet(s) Oral at bedtime PRN  silver sulfADIAZINE 1% Cream 1 Application(s) Topical daily  tiotropium 18 MICROgram(s) Capsule 1 Capsule(s) Inhalation daily  vancomycin  IVPB 1000 milliGRAM(s) IV Intermittent every 12 hours  warfarin 2 milliGRAM(s) Oral once    PAST MEDICAL & SURGICAL HISTORY:  CAD (coronary artery disease): HERBERT to LAD 2016  Aortic stenosis, severe  Uncomplicated asthma, unspecified asthma severity  Polymyalgia  Lumbar disc disease with radiculopathy  Spider veins  Anxiety  Severe aortic stenosis by prior echocardiogram:  and  2016  Dysphagia  Macular degeneration  Hiatal hernia  GERD (gastroesophageal reflux disease)  Sciatica  Osteoarthritis  S/P TKR (total knee replacement): joint infection s/p explant and abx spacer on 17  S/P TAVR (transcatheter aortic valve replacement): 17  Artificial cardiac pacemaker: 17  H/O cardiac catheterization: 16 HERBERT placed on LAD  H/O endoscopy: with dilatation of esophageal stricture   S/P cataract surgery: x2; 3-4yr ago  S/P gastroplasty: 28 yrs ago  S/P cholecystectomy: more than 15 years ago  S/P total knee replacement: left, 15yr ago  S/P total knee replacement: right, 12yr ago  S/P hysterectomy: 24yr ago    Vitals:  T(C): 37.8 (18 @ 19:33), Max: 38.4 (18 @ 05:11)  HR: 91 (18 @ 19:33) (73 - 98)  BP: 119/60 (18 @ 19:33) (118/66 - 164/79)  BP(mean): --  RR: 18 (18 @ 19:33) (18 - 20)  SpO2: 97% (18 @ 19:33) (95% - 97%)  Wt(kg): --  Daily     Daily Weight in k.2 (2018 06:00)  I&O's Summary    2018 07:  -  2018 07:00  --------------------------------------------------------  IN: 1784 mL / OUT: 750 mL / NET: 1034 mL    2018 07:01  -  2018 21:52  --------------------------------------------------------  IN: 692 mL / OUT: 230 mL / NET: 462 mL        Physical Exam:  Appearance: Obese, NAD  Eyes: PERRLA, EOMI, pale conjunctiva, no scleral icterus   HENT: Normal oral mucosa  Cardiovascular: RRR, S1, S2, no murmur, rub, or gallop; no JVD  Procedural Access Site: Clean, dry, intact, without hematoma - right leg in brace  Respiratory: Clear to auscultation bilaterally anteriorly  Gastrointestinal: Soft, non-tender, non-distended, BS+  Musculoskeletal: No clubbing or joint deformity   Neurologic: No focal weakness  Lymphatic: No lymphadenopathy  Psychiatry: AAOx3 with appropriate mood and affect  Skin: No rashes, ecchymoses, or cyanosis                          7.4    17.59 )-----------( 285      ( 2018 07:50 )             23.8     04-13    130<L>  |  94<L>  |  16  ----------------------------<  105<H>  3.3<L>   |  25  |  0.75    Ca    8.4      2018 05:12      PT/INR - ( 2018 05:14 )   PT: 18.4 sec;   INR: 1.67 ratio         PTT - ( 2018 05:14 )  PTT:94.2 sec

## 2018-04-13 NOTE — PROGRESS NOTE ADULT - SUBJECTIVE AND OBJECTIVE BOX
CHIEF COMPLAINT:    Interval Events:    REVIEW OF SYSTEMS:  Constitutional: No fevers or chills. No weight loss. No fatigue or generalized malaise.  Eyes: No itching or discharge from the eyes  ENT: No ear pain. No ear discharge. No nasal congestion. No post nasal drip. No epistaxis. No throat pain. No sore throat. No difficulty swallowing.   CV: No chest pain. No palpitations. No lightheadedness or dizziness.   Resp: No dyspnea at rest. No dyspnea on exertion. No orthopnea. No wheezing. No cough. No stridor. No sputum production. No chest pain with respiration.  GI: No nausea. No vomiting. No diarrhea.  MSK: No joint pain or pain in any extremities  Integumentary: No skin lesions. No pedal edema.  Neurological: No gross motor weakness. No sensory changes.  [ ] All other systems negative  [ ] Unable to assess ROS because ________    OBJECTIVE:  ICU Vital Signs Last 24 Hrs  T(C): 36.7 (13 Apr 2018 01:04), Max: 37.2 (12 Apr 2018 13:35)  T(F): 98.1 (13 Apr 2018 01:04), Max: 99 (12 Apr 2018 13:35)  HR: 89 (13 Apr 2018 01:04) (89 - 92)  BP: 136/71 (13 Apr 2018 01:04) (126/70 - 136/71)  BP(mean): --  ABP: --  ABP(mean): --  RR: 20 (13 Apr 2018 01:04) (18 - 20)  SpO2: 95% (13 Apr 2018 01:04) (95% - 98%)        04-11 @ 07:01 - 04-12 @ 07:00  --------------------------------------------------------  IN: 1954 mL / OUT: 1200 mL / NET: 754 mL    04-12 @ 07:01  - 04-13 @ 04:54  --------------------------------------------------------  IN: 1062 mL / OUT: 350 mL / NET: 712 mL      CAPILLARY BLOOD GLUCOSE          PHYSICAL EXAM:  General: Awake, alert, oriented X 3.   HEENT: Atraumatic, normocephalic.                 Mallampatti Grade                 No nasal congestion.                No tonsillar or pharyngeal exudates.  Lymph Nodes: No palpable lymphadenopathy  Neck: No JVD. No carotid bruit.   Respiratory: Normal chest expansion                         Normal percussion                         Normal and equal air entry                         No wheeze, rhonchi or rales.  Cardiovascular: S1 S2 normal. No murmurs, rubs or gallops.   Abdomen: Soft, non-tender, non-distended. No organomegaly.  Extremities: Warm to touch. Peripheral pulse palpable. No pedal edema.   Skin: No rashes or skin lesions  Neurological: Motor and sensory examination equal and normal in all four extremities.  Psychiatry: Appropriate mood and affect.    HOSPITAL MEDICATIONS:  MEDICATIONS  (STANDING):  ascorbic acid 500 milliGRAM(s) Oral daily  aspirin enteric coated 81 milliGRAM(s) Oral daily  aztreonam  IVPB 2000 milliGRAM(s) IV Intermittent every 8 hours  buDESOnide 160 MICROgram(s)/formoterol 4.5 MICROgram(s) Inhaler 2 Puff(s) Inhalation two times a day  calcium carbonate 1250 mG + Vitamin D (OsCal 500 + D) 1 Tablet(s) Oral three times a day  clotrimazole Lozenge 1 Lozenge Oral five times a day  docusate sodium 100 milliGRAM(s) Oral three times a day  ferrous    sulfate 325 milliGRAM(s) Oral daily  folic acid 1 milliGRAM(s) Oral daily  heparin  Infusion.  Unit(s)/Hr (21 mL/Hr) IV Continuous <Continuous>  multivitamin 1 Tablet(s) Oral daily  pantoprazole    Tablet 40 milliGRAM(s) Oral before breakfast  polyethylene glycol 3350 17 Gram(s) Oral daily  tiotropium 18 MICROgram(s) Capsule 1 Capsule(s) Inhalation daily  vancomycin  IVPB 1000 milliGRAM(s) IV Intermittent every 12 hours    MEDICATIONS  (PRN):  acetaminophen   Tablet 650 milliGRAM(s) Oral every 6 hours PRN For Temp greater than 38 C (100.4 F)  acetaminophen   Tablet. 650 milliGRAM(s) Oral every 6 hours PRN Moderate Pain (4 - 6)  heparin  Injectable 9500 Unit(s) IV Push every 6 hours PRN For aPTT less than 40  heparin  Injectable 4500 Unit(s) IV Push every 6 hours PRN For aPTT between 40 - 57  oxyCODONE    IR 5 milliGRAM(s) Oral every 4 hours PRN Severe Pain (7 - 10)  senna 2 Tablet(s) Oral at bedtime PRN Constipation      LABS:                        7.6    14.58 )-----------( 247      ( 12 Apr 2018 07:48 )             22.1     04-12    132<L>  |  96  |  18  ----------------------------<  111<H>  3.5   |  25  |  0.77    Ca    8.4      12 Apr 2018 05:15      PT/INR - ( 12 Apr 2018 07:54 )   PT: 16.7 sec;   INR: 1.47 ratio         PTT - ( 12 Apr 2018 05:15 )  PTT:81.7 sec          MICROBIOLOGY:     RADIOLOGY:  [ ] Reviewed and interpreted by me    Point of Care Ultrasound Findings:    PFT:    EKG: CHIEF COMPLAINT: exhausted over all, febrile, some constipation; no sob or cough    Interval Events: no OOB or Swallow evaln    REVIEW OF SYSTEMS:  Constitutional: + fevers or chills. No weight loss. + fatigue or generalized malaise.  Eyes: No itching or discharge from the eyes  ENT: No ear pain. No ear discharge. No nasal congestion. No post nasal drip. No epistaxis. No throat pain. No sore throat. No difficulty swallowing.   CV: No chest pain. No palpitations. No lightheadedness or dizziness.   Resp: No dyspnea at rest. No dyspnea on exertion. No orthopnea. No wheezing. No cough. No stridor. No sputum production. No chest pain with respiration.  GI: No nausea. No vomiting. No diarrhea.  MSK: No joint pain or pain in any extremities--except right leg  Integumentary: No skin lesions. + pedal edema.  Neurological: No gross motor weakness. No sensory changes.  [+ ] All other systems negative  [ ] Unable to assess ROS because ________    OBJECTIVE:  ICU Vital Signs Last 24 Hrs  T(C): 36.7 (13 Apr 2018 01:04), Max: 37.2 (12 Apr 2018 13:35)  T(F): 98.1 (13 Apr 2018 01:04), Max: 99 (12 Apr 2018 13:35)  HR: 89 (13 Apr 2018 01:04) (89 - 92)  BP: 136/71 (13 Apr 2018 01:04) (126/70 - 136/71)  BP(mean): --  ABP: --  ABP(mean): --  RR: 20 (13 Apr 2018 01:04) (18 - 20)  SpO2: 95% (13 Apr 2018 01:04) (95% - 98%)        04-11 @ 07:01  -  04-12 @ 07:00  --------------------------------------------------------  IN: 1954 mL / OUT: 1200 mL / NET: 754 mL    04-12 @ 07:01  - 04-13 @ 04:54  --------------------------------------------------------  IN: 1062 mL / OUT: 350 mL / NET: 712 mL      CAPILLARY BLOOD GLUCOSE          PHYSICAL EXAM: exhausted in bed  General: Awake, alert, oriented X 3.   HEENT: Atraumatic, normocephalic.                 Mallampatti Grade 3                No nasal congestion.                No tonsillar or pharyngeal exudates.  Lymph Nodes: No palpable lymphadenopathy  Neck: No JVD. No carotid bruit.   Respiratory: reduced chest expansion                         Normal percussion                         reduced but equal air entry                         No wheeze, rhonchi or rales.  Cardiovascular: S1 S2 normal. + murmurs, rubs or gallops.   Abdomen: Soft, non-tender, non-distended. No organomegaly.  Extremities: Warm to touch. Peripheral pulse palpable. + pedal edema.   Skin: No rashes or skin lesions  Neurological: Motor and sensory examination equal and normal in all four extremities.  Psychiatry: Appropriate mood and affect.    HOSPITAL MEDICATIONS:  MEDICATIONS  (STANDING):  ascorbic acid 500 milliGRAM(s) Oral daily  aspirin enteric coated 81 milliGRAM(s) Oral daily  aztreonam  IVPB 2000 milliGRAM(s) IV Intermittent every 8 hours  buDESOnide 160 MICROgram(s)/formoterol 4.5 MICROgram(s) Inhaler 2 Puff(s) Inhalation two times a day  calcium carbonate 1250 mG + Vitamin D (OsCal 500 + D) 1 Tablet(s) Oral three times a day  clotrimazole Lozenge 1 Lozenge Oral five times a day  docusate sodium 100 milliGRAM(s) Oral three times a day  ferrous    sulfate 325 milliGRAM(s) Oral daily  folic acid 1 milliGRAM(s) Oral daily  heparin  Infusion.  Unit(s)/Hr (21 mL/Hr) IV Continuous <Continuous>  multivitamin 1 Tablet(s) Oral daily  pantoprazole    Tablet 40 milliGRAM(s) Oral before breakfast  polyethylene glycol 3350 17 Gram(s) Oral daily  tiotropium 18 MICROgram(s) Capsule 1 Capsule(s) Inhalation daily  vancomycin  IVPB 1000 milliGRAM(s) IV Intermittent every 12 hours    MEDICATIONS  (PRN):  acetaminophen   Tablet 650 milliGRAM(s) Oral every 6 hours PRN For Temp greater than 38 C (100.4 F)  acetaminophen   Tablet. 650 milliGRAM(s) Oral every 6 hours PRN Moderate Pain (4 - 6)  heparin  Injectable 9500 Unit(s) IV Push every 6 hours PRN For aPTT less than 40  heparin  Injectable 4500 Unit(s) IV Push every 6 hours PRN For aPTT between 40 - 57  oxyCODONE    IR 5 milliGRAM(s) Oral every 4 hours PRN Severe Pain (7 - 10)  senna 2 Tablet(s) Oral at bedtime PRN Constipation      LABS:                        7.6    14.58 )-----------( 247      ( 12 Apr 2018 07:48 )             22.1     04-12    132<L>  |  96  |  18  ----------------------------<  111<H>  3.5   |  25  |  0.77    Ca    8.4      12 Apr 2018 05:15      PT/INR - ( 12 Apr 2018 07:54 )   PT: 16.7 sec;   INR: 1.47 ratio         PTT - ( 12 Apr 2018 05:15 )  PTT:81.7 sec          MICROBIOLOGY:     RADIOLOGY:  [ ] Reviewed and interpreted by me    Point of Care Ultrasound Findings:    PFT:    EKG:

## 2018-04-13 NOTE — CHART NOTE - NSCHARTNOTEFT_GEN_A_CORE
Notified by RN that patient has a temperature of  101.1. Patient seen and evaluated at the bedside. Endorses feeling warm. Denies chills, sweats, dizziness, headache, nausea, vomiting, shortness of breath, chest pain, abdominal pain and dysuria.     Vital Signs   T(C): 38.4 (04-13-18 @ 05:11), Max: 38.4 (04-13-18 @ 05:11)  HR: 92 (04-13-18 @ 05:11) (89 - 92)  BP: 126/73 (04-13-18 @ 05:11) (126/70 - 136/71)  RR: 18 (04-13-18 @ 05:11) (18 - 20)  SpO2: 96% (04-13-18 @ 05:11) (95% - 98%)  Wt(kg): --                        7.6    14.58 )-----------( 247      ( 12 Apr 2018 07:48 )             22.1     04-13    130<L>  |  94<L>  |  16  ----------------------------<  105<H>  3.3<L>   |  25  |  0.75    Ca    8.4      13 Apr 2018 05:12    Culture - Blood (04.08.18 @ 21:46)    Specimen Source: .Blood Blood-Peripheral    Culture Results:   No growth to date.    Culture - Blood (04.08.18 @ 21:46)    Specimen Source: .Blood Blood-Peripheral    Culture Results:   No growth to date.    Culture - Urine (04.08.18 @ 21:44)    Specimen Source: .Urine Catheterized    Culture Results:   <10,000 CFU/ml  Normal Urogenital roopa present    < from: CT Chest No Cont (04.09.18 @ 22:31) >    IMPRESSION:     Multifocal pneumonia.    No acute intra-abdominal pathology.    No evidence of retroperitoneal hematoma. Small subcutaneous hematoma   within the left lower anterior abdominal wall.    < end of copied text >        Physical Exam:   General: A&O x  3, NAD  Cardiac: S1S2, RRR  Pulmonary: CTAB, no wheezing or crackles  Abdomen: soft, nontender, nondistended, positive bowel sounds   Extremities: no edema  Skin: no rash, dry, warm     MEDICATIONS  (STANDING):  ascorbic acid 500 milliGRAM(s) Oral daily  aspirin enteric coated 81 milliGRAM(s) Oral daily  aztreonam  IVPB 2000 milliGRAM(s) IV Intermittent every 8 hours  buDESOnide 160 MICROgram(s)/formoterol 4.5 MICROgram(s) Inhaler 2 Puff(s) Inhalation two times a day  calcium carbonate 1250 mG + Vitamin D (OsCal 500 + D) 1 Tablet(s) Oral three times a day  clotrimazole Lozenge 1 Lozenge Oral five times a day  docusate sodium 100 milliGRAM(s) Oral three times a day  ferrous    sulfate 325 milliGRAM(s) Oral daily  folic acid 1 milliGRAM(s) Oral daily  heparin  Infusion.  Unit(s)/Hr (21 mL/Hr) IV Continuous <Continuous>  multivitamin 1 Tablet(s) Oral daily  pantoprazole    Tablet 40 milliGRAM(s) Oral before breakfast  polyethylene glycol 3350 17 Gram(s) Oral daily  tiotropium 18 MICROgram(s) Capsule 1 Capsule(s) Inhalation daily  vancomycin  IVPB 1000 milliGRAM(s) IV Intermittent every 12 hours    HPI:  Review of history and patient recent course:  78yo f PMH including HLD, GERD, Anxiety, Anemia, CAD s/p HERBERT, severe AS (s/p TAVR & PPM 12/17 [place] [employer] Model [id number]), h/o MVR, PMR on chronic steroids, asthma, OA, s/p TKR with recent joint infection s/p explant and abx spacer on 12/23/17, + rehab when had RLE DVT (dvt proph was held 2nd nose bleed)  on Coumadins/p  3/23/2018 Right knee explantation of previously placed articulating antibiotic spacer, irrigation and debridement of the knee, placement of static antibiotic spacer, with a Plastic Surgery soft tissue complex wound closure.   per ID: complete 6 weeks of iv daptomycin (end 5/5) and then prolonged po keflex and minocycline, local wound care per plastics monitor CMP, CBC with differential and CPK weekly.   Orthopedics consulted: continue antibiotics as per ID and wound treatment as per plastics. Ice, elevate 50% PWD. Pt currently no knee ROM.   Pt was consulted by Cardiology for short episode of Vtach: Pt started on low dose Beta blocker and tolerated. Coumadin for DVT. Monitor INR.  HIstory via patient and aide at bedside.  Originally at rehab pt was developing worsening weakness.  Overall poor appetite.  Developed fever/chills yesterday and taken to ED for further work up.  Seen by ortho and plastics in ED with fever to 103.5.  Given vanco/aztreonam, tylenol and oxycodone 5mg x 1. (09 Apr 2018 01:13)    Fever- likely secondary to PNA  1) Continue to monitor vital signs. Recheck temperature in 1-2 hours.   2) Tylenol given for fever  3) Blood culture x 2 and urine culture. Follow up with results.   4) Encourage hydration.   5) Cooling measures- ice packs  6) Continue with above antimicrobial management-vancomycin, aztreonam.   7) Follow up with primary team in the morning.     Annalee Starks PA-C   [phone]

## 2018-04-13 NOTE — PROGRESS NOTE ADULT - SUBJECTIVE AND OBJECTIVE BOX
Pt seen and examined. Doing well. No acute events o/n.     T(C): 38.4 (04-13-18 @ 05:11), Max: 38.4 (04-13-18 @ 05:11)  T(F): 101.1 (04-13-18 @ 05:11), Max: 101.1 (04-13-18 @ 05:11)  HR: 92 (04-13-18 @ 05:11) (89 - 92)  BP: 126/73 (04-13-18 @ 05:11) (126/70 - 136/71)  RR: 18 (04-13-18 @ 05:11) (18 - 20)  SpO2: 96% (04-13-18 @ 05:11) (95% - 98%)      11 Apr 2018 07:01  -  12 Apr 2018 07:00  --------------------------------------------------------  IN:    heparin  Infusion.: 294 mL    IV PiggyBack: 700 mL    Oral Fluid: 960 mL  Total IN: 1954 mL    OUT:    Indwelling Catheter - Urethral: 1200 mL  Total OUT: 1200 mL    Total NET: 754 mL      12 Apr 2018 07:01  -  13 Apr 2018 06:42  --------------------------------------------------------  IN:    heparin  Infusion.: 252 mL    IV PiggyBack: 450 mL    Oral Fluid: 360 mL  Total IN: 1062 mL    OUT:    Indwelling Catheter - Urethral: 350 mL  Total OUT: 350 mL    Total NET: 712 mL          Exam:  R proximal thigh with ruptured bullae   R knee with stable eschar and incision with staples                          7.6    14.58 )-----------( 247      ( 12 Apr 2018 07:48 )             22.1     04-13    130<L>  |  94<L>  |  16  ----------------------------<  105<H>  3.3<L>   |  25  |  0.75    Ca    8.4      13 Apr 2018 05:12        Plan:

## 2018-04-13 NOTE — SWALLOW BEDSIDE ASSESSMENT ADULT - ASR SWALLOW RECOMMEND DIAG
This service will continue to follow to assess for tolerance to recommended diet and to assess candidacy for objective swallow assessment as pt medically optimized.

## 2018-04-13 NOTE — PROGRESS NOTE ADULT - SUBJECTIVE AND OBJECTIVE BOX
Patient is a 77y old  Female who presents with a chief complaint of fever (11 Apr 2018 14:25)  pt seen and examined at bedside   SUBJECTIVE/OVERNIGHT events noted, feels well  spiked temp 101 this am, denies any cp/sob/cough  rt prox thigh tenderness persists  had a normal Bm yesterday    Vital Signs Last 24 Hrs  T(C): 36.1 (13 Apr 2018 11:40), Max: 38.4 (13 Apr 2018 05:11)  T(F): 97 (13 Apr 2018 11:40), Max: 101.1 (13 Apr 2018 05:11)  HR: 73 (13 Apr 2018 11:40) (73 - 92)  BP: 118/66 (13 Apr 2018 11:40) (118/66 - 136/71)  BP(mean): --  RR: 18 (13 Apr 2018 11:40) (18 - 20)  SpO2: 96% (13 Apr 2018 11:40) (95% - 98%)  CAPILLARY BLOOD GLUCOSE        MEDICATIONS  (STANDING):  ascorbic acid 500 milliGRAM(s) Oral daily  aspirin enteric coated 81 milliGRAM(s) Oral daily  aztreonam  IVPB 2000 milliGRAM(s) IV Intermittent every 8 hours  buDESOnide 160 MICROgram(s)/formoterol 4.5 MICROgram(s) Inhaler 2 Puff(s) Inhalation two times a day  calcium carbonate 1250 mG + Vitamin D (OsCal 500 + D) 1 Tablet(s) Oral three times a day  clotrimazole Lozenge 1 Lozenge Oral five times a day  docusate sodium 100 milliGRAM(s) Oral three times a day  ferrous    sulfate 325 milliGRAM(s) Oral daily  folic acid 1 milliGRAM(s) Oral daily  heparin  Infusion.  Unit(s)/Hr (21 mL/Hr) IV Continuous <Continuous>  multivitamin 1 Tablet(s) Oral daily  pantoprazole    Tablet 40 milliGRAM(s) Oral before breakfast  polyethylene glycol 3350 17 Gram(s) Oral daily  povidone iodine 10% Swab 1 Application(s) Topical daily  silver sulfADIAZINE 1% Cream 1 Application(s) Topical daily  tiotropium 18 MICROgram(s) Capsule 1 Capsule(s) Inhalation daily  vancomycin  IVPB 1000 milliGRAM(s) IV Intermittent every 12 hours    MEDICATIONS  (PRN):  acetaminophen   Tablet 650 milliGRAM(s) Oral every 6 hours PRN For Temp greater than 38 C (100.4 F)  acetaminophen   Tablet. 650 milliGRAM(s) Oral every 6 hours PRN Moderate Pain (4 - 6)  heparin  Injectable 9500 Unit(s) IV Push every 6 hours PRN For aPTT less than 40  heparin  Injectable 4500 Unit(s) IV Push every 6 hours PRN For aPTT between 40 - 57  oxyCODONE    IR 5 milliGRAM(s) Oral every 4 hours PRN Severe Pain (7 - 10)  senna 2 Tablet(s) Oral at bedtime PRN Constipation    PHYSICAL EXAM  General : comfortable, not in any acute distress  Neck : supple, no LAD, no JVD  Eyes : no icterus, no pallor  MM moist, no pharyngeal erythema/exudates  Pulmonary: clear to auscultation bilateral lung fields, no crackles/rhonchi/wheezing,   CVS : S1 S2,rate normal-,rhythm-regular, no murmurs, no abnormal sounds  Gastrointestinal: soft, non tender, non distended, no organomegaly , bowel sounds audible  Extremities: Pos edema, Rt LE brace  Prox thigh swollen, tender blister breakdown-dressing present  Neuro: AAOx3, no focal deficits  Skin: no rash  LABS                        7.4    17.59 )-----------( 285      ( 13 Apr 2018 07:50 )             23.8     04-13    130<L>  |  94<L>  |  16  ----------------------------<  105<H>  3.3<L>   |  25  |  0.75    Ca    8.4      13 Apr 2018 05:12        RADIOLOGY and IMAGING reviewed: yes  Consultant notes reviewed : yes  Care discussed with Consultants/other providers :

## 2018-04-13 NOTE — CHART NOTE - NSCHARTNOTEFT_GEN_A_CORE
Notified by RN that patient appears lethargic. Patient seen and evaluated at the bedside at 1:10 AM. Patient sleeping upon arriving to the room. Patient's aid, Lorenza, in the room. Patient appropriately answering questions, A & O x 4 with a nonfocal neurologic examination. Patient awaken fully, patient at baseline. Asking why she was awoken. No intervention currently required as patient is at baseline. Will continue to monitor.     Annalee Starks PA-C  [phone]

## 2018-04-13 NOTE — PROGRESS NOTE ADULT - ATTENDING COMMENTS
I agree with the above note and have personally seen and examined this patient. All pertinent films have been reviewed. Please refer to clinical documentation of the history, physical examinations, data summary, and both assessment and plan as documented above and with which I agree.    remains medically ill, high wbc, spiked 1x fever x 24 hours  lethargic  remainder vitals stable, inr resolved  agree with ERIKA to check for vegetations, recommend repeat BCx  no surgical intervention at this time  I had a long discussion again with the patients  via telephone today and he was asking me if this could be a terminal event for the patient. We had discussed this prior to both operations and again we discussed that she is at increased risk for mortality from her infection given her preoperative decompensated status, morbid obesity, dvt, refusal to get oob, chronic steroid use, and other significant risk factors. I informed him that we and all the different medical/surgical teams involved are doing everythign we can to help his wife. He is appreciative and understanding of this.    Nate Bass MD  Attending Orthopedic Surgeon

## 2018-04-13 NOTE — PROGRESS NOTE ADULT - ASSESSMENT
Plan  - recommend silvadene to proximal thigh  - betadine to eschar  - keep wound clean & dry  - brace per ortho

## 2018-04-13 NOTE — SWALLOW BEDSIDE ASSESSMENT ADULT - SWALLOW EVAL: RECOMMENDED FEEDING/EATING TECHNIQUES
allow for swallow between intakes/only feed when awake/alert/position upright (90 degrees)/check mouth frequently for oral residue/pocketing/crush medication (when feasible)/maintain upright posture during/after eating for 30 mins/no straws/small sips/bites

## 2018-04-13 NOTE — PROGRESS NOTE ADULT - ASSESSMENT
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    She returned to rehab on IV Dapto, but sent back with fever, leukocytosis and found to have multifocal pneumonia.   On vanco and aztreonam- Cxs negative, but temps persist, leukocytosis worse  Dependent edema and thigh hematoma, no discreet collection on Sono, but might question hematoma as link to fever  Vanco level therapeutic  Otherwise, would have to consider ongoing aspiration    Plan:  Continue present Vanco + Aztreo directed at pneumonia and MRSA R knee infex- plan was to complete another 24 hrs, followed by switch back to Dapto alone  Follow temps and CBC/diff   D/w pt and aide at bedside

## 2018-04-14 LAB
ANION GAP SERPL CALC-SCNC: 12 MMOL/L — SIGNIFICANT CHANGE UP (ref 5–17)
APTT BLD: 97.3 SEC — HIGH (ref 27.5–37.4)
BASOPHILS # BLD AUTO: 0.03 K/UL — SIGNIFICANT CHANGE UP (ref 0–0.2)
BASOPHILS NFR BLD AUTO: 0.1 % — SIGNIFICANT CHANGE UP (ref 0–2)
BUN SERPL-MCNC: 17 MG/DL — SIGNIFICANT CHANGE UP (ref 7–23)
CALCIUM SERPL-MCNC: 8.1 MG/DL — LOW (ref 8.4–10.5)
CHLORIDE SERPL-SCNC: 95 MMOL/L — LOW (ref 96–108)
CO2 SERPL-SCNC: 25 MMOL/L — SIGNIFICANT CHANGE UP (ref 22–31)
CREAT SERPL-MCNC: 0.7 MG/DL — SIGNIFICANT CHANGE UP (ref 0.5–1.3)
EOSINOPHIL # BLD AUTO: 0.39 K/UL — SIGNIFICANT CHANGE UP (ref 0–0.5)
EOSINOPHIL NFR BLD AUTO: 1.9 % — SIGNIFICANT CHANGE UP (ref 0–6)
GLUCOSE SERPL-MCNC: 107 MG/DL — HIGH (ref 70–99)
HCT VFR BLD CALC: 21.7 % — LOW (ref 34.5–45)
HGB BLD-MCNC: 7.3 G/DL — LOW (ref 11.5–15.5)
IMM GRANULOCYTES NFR BLD AUTO: 2.9 % — HIGH (ref 0–1.5)
INR BLD: 2.2 RATIO — HIGH (ref 0.88–1.16)
LYMPHOCYTES # BLD AUTO: 1.88 K/UL — SIGNIFICANT CHANGE UP (ref 1–3.3)
LYMPHOCYTES # BLD AUTO: 9.2 % — LOW (ref 13–44)
MCHC RBC-ENTMCNC: 28.4 PG — SIGNIFICANT CHANGE UP (ref 27–34)
MCHC RBC-ENTMCNC: 33.6 GM/DL — SIGNIFICANT CHANGE UP (ref 32–36)
MCV RBC AUTO: 84.4 FL — SIGNIFICANT CHANGE UP (ref 80–100)
MONOCYTES # BLD AUTO: 1.3 K/UL — HIGH (ref 0–0.9)
MONOCYTES NFR BLD AUTO: 6.4 % — SIGNIFICANT CHANGE UP (ref 2–14)
NEUTROPHILS # BLD AUTO: 16.17 K/UL — HIGH (ref 1.8–7.4)
NEUTROPHILS NFR BLD AUTO: 79.5 % — HIGH (ref 43–77)
PLATELET # BLD AUTO: 294 K/UL — SIGNIFICANT CHANGE UP (ref 150–400)
POTASSIUM SERPL-MCNC: 3.4 MMOL/L — LOW (ref 3.5–5.3)
POTASSIUM SERPL-SCNC: 3.4 MMOL/L — LOW (ref 3.5–5.3)
PROTHROM AB SERPL-ACNC: 24.1 SEC — HIGH (ref 9.8–12.7)
RBC # BLD: 2.57 M/UL — LOW (ref 3.8–5.2)
RBC # FLD: 16.7 % — HIGH (ref 10.3–14.5)
SODIUM SERPL-SCNC: 132 MMOL/L — LOW (ref 135–145)
WBC # BLD: 20.37 K/UL — HIGH (ref 3.8–10.5)
WBC # FLD AUTO: 20.37 K/UL — HIGH (ref 3.8–10.5)

## 2018-04-14 RX ORDER — POTASSIUM CHLORIDE 20 MEQ
20 PACKET (EA) ORAL
Qty: 0 | Refills: 0 | Status: COMPLETED | OUTPATIENT
Start: 2018-04-14 | End: 2018-04-14

## 2018-04-14 RX ORDER — WARFARIN SODIUM 2.5 MG/1
2 TABLET ORAL ONCE
Qty: 0 | Refills: 0 | Status: COMPLETED | OUTPATIENT
Start: 2018-04-14 | End: 2018-04-14

## 2018-04-14 RX ORDER — DAPTOMYCIN 500 MG/10ML
800 INJECTION, POWDER, LYOPHILIZED, FOR SOLUTION INTRAVENOUS EVERY 24 HOURS
Qty: 0 | Refills: 0 | Status: DISCONTINUED | OUTPATIENT
Start: 2018-04-15 | End: 2018-05-05

## 2018-04-14 RX ORDER — LIDOCAINE 4 G/100G
10 CREAM TOPICAL EVERY 6 HOURS
Qty: 0 | Refills: 0 | Status: DISCONTINUED | OUTPATIENT
Start: 2018-04-14 | End: 2018-06-06

## 2018-04-14 RX ADMIN — Medication 1 LOZENGE: at 20:05

## 2018-04-14 RX ADMIN — SENNA PLUS 2 TABLET(S): 8.6 TABLET ORAL at 21:53

## 2018-04-14 RX ADMIN — Medication 100 MILLIGRAM(S): at 17:00

## 2018-04-14 RX ADMIN — Medication 1 MILLIGRAM(S): at 13:51

## 2018-04-14 RX ADMIN — Medication 100 MILLIGRAM(S): at 13:51

## 2018-04-14 RX ADMIN — OXYCODONE HYDROCHLORIDE 5 MILLIGRAM(S): 5 TABLET ORAL at 16:53

## 2018-04-14 RX ADMIN — OXYCODONE HYDROCHLORIDE 5 MILLIGRAM(S): 5 TABLET ORAL at 17:30

## 2018-04-14 RX ADMIN — Medication 1 TABLET(S): at 13:46

## 2018-04-14 RX ADMIN — Medication 100 MILLIGRAM(S): at 09:03

## 2018-04-14 RX ADMIN — POLYETHYLENE GLYCOL 3350 17 GRAM(S): 17 POWDER, FOR SOLUTION ORAL at 13:48

## 2018-04-14 RX ADMIN — Medication 1 APPLICATION(S): at 13:52

## 2018-04-14 RX ADMIN — Medication 81 MILLIGRAM(S): at 13:49

## 2018-04-14 RX ADMIN — WARFARIN SODIUM 2 MILLIGRAM(S): 2.5 TABLET ORAL at 21:53

## 2018-04-14 RX ADMIN — Medication 20 MILLIEQUIVALENT(S): at 13:46

## 2018-04-14 RX ADMIN — Medication 20 MILLIEQUIVALENT(S): at 13:51

## 2018-04-14 RX ADMIN — BUDESONIDE AND FORMOTEROL FUMARATE DIHYDRATE 2 PUFF(S): 160; 4.5 AEROSOL RESPIRATORY (INHALATION) at 06:15

## 2018-04-14 RX ADMIN — Medication 100 MILLIGRAM(S): at 21:53

## 2018-04-14 RX ADMIN — Medication 1 TABLET(S): at 21:54

## 2018-04-14 RX ADMIN — TIOTROPIUM BROMIDE 1 CAPSULE(S): 18 CAPSULE ORAL; RESPIRATORY (INHALATION) at 13:50

## 2018-04-14 RX ADMIN — Medication 1 TABLET(S): at 14:05

## 2018-04-14 RX ADMIN — Medication 250 MILLIGRAM(S): at 07:54

## 2018-04-14 RX ADMIN — Medication 1 APPLICATION(S): at 13:58

## 2018-04-14 RX ADMIN — Medication 250 MILLIGRAM(S): at 20:05

## 2018-04-14 RX ADMIN — HEPARIN SODIUM 2100 UNIT(S)/HR: 5000 INJECTION INTRAVENOUS; SUBCUTANEOUS at 06:14

## 2018-04-14 RX ADMIN — Medication 1 TABLET(S): at 06:17

## 2018-04-14 RX ADMIN — BUDESONIDE AND FORMOTEROL FUMARATE DIHYDRATE 2 PUFF(S): 160; 4.5 AEROSOL RESPIRATORY (INHALATION) at 17:01

## 2018-04-14 RX ADMIN — Medication 325 MILLIGRAM(S): at 13:49

## 2018-04-14 RX ADMIN — Medication 100 MILLIGRAM(S): at 01:21

## 2018-04-14 RX ADMIN — Medication 500 MILLIGRAM(S): at 13:48

## 2018-04-14 NOTE — PROGRESS NOTE ADULT - SUBJECTIVE AND OBJECTIVE BOX
Interval Events: Patient was seen and examined at bedside. No overnight events. White count continues to uptrend. Low grade fevers  Feels the same.     REVIEW OF SYSTEMS:  Constitutional: [+ ] fevers [ ] chills [ ] weight loss [ ] weight gain  CV: [ ] chest pain [ ] orthopnea [ ] palpitations [ ] murmur  Resp: [ -] cough [- ] shortness of breath [ ] dyspnea [ ] wheezing [ ] sputum [ ] hemoptysis  [ ] All other systems negative  [ ] Unable to assess ROS because ________    OBJECTIVE:  ICU Vital Signs Last 24 Hrs  T(C): 37.8 (2018 06:11), Max: 37.8 (2018 15:43)  T(F): 100 (2018 06:11), Max: 100.1 (2018 15:43)  HR: 93 (2018 06:11) (73 - 98)  BP: 128/57 (2018 06:11) (118/66 - 164/79)  RR: 18 (2018 06:11) (18 - 18)  SpO2: 95% (2018 06:11) (95% - 97%)        04-13 @ 07:01  -  04-14 @ 07:00  --------------------------------------------------------  IN: 1414 mL / OUT: 780 mL / NET: 634 mL    PHYSICAL EXAM:  General: obese woman, nad  HEENT: op clear  Lymph Nodes: No palpable lymphadenopathy  Neck: supple  Respiratory: clear, few rhonchi at bases  Cardiovascular: rrr  Abdomen: SNT, +BS, No R/G/R, obese  Extremities: +clara, RLE painful with erythema, knee in bandage  Skin: + medial thigh erythema  Neurological: grossly intact      HOSPITAL MEDICATIONS:  MEDICATIONS  (STANDING):  ascorbic acid 500 milliGRAM(s) Oral daily  aspirin enteric coated 81 milliGRAM(s) Oral daily  aztreonam  IVPB 2000 milliGRAM(s) IV Intermittent every 8 hours  buDESOnide 160 MICROgram(s)/formoterol 4.5 MICROgram(s) Inhaler 2 Puff(s) Inhalation two times a day  calcium carbonate 1250 mG + Vitamin D (OsCal 500 + D) 1 Tablet(s) Oral three times a day  clotrimazole Lozenge 1 Lozenge Oral five times a day  docusate sodium 100 milliGRAM(s) Oral three times a day  ferrous    sulfate 325 milliGRAM(s) Oral daily  folic acid 1 milliGRAM(s) Oral daily  multivitamin 1 Tablet(s) Oral daily  pantoprazole    Tablet 40 milliGRAM(s) Oral before breakfast  polyethylene glycol 3350 17 Gram(s) Oral daily  potassium chloride    Tablet ER 20 milliEquivalent(s) Oral every 2 hours  povidone iodine 10% Swab 1 Application(s) Topical daily  silver sulfADIAZINE 1% Cream 1 Application(s) Topical daily  tiotropium 18 MICROgram(s) Capsule 1 Capsule(s) Inhalation daily  vancomycin  IVPB 1000 milliGRAM(s) IV Intermittent every 12 hours  warfarin 2 milliGRAM(s) Oral once    MEDICATIONS  (PRN):  acetaminophen   Tablet 650 milliGRAM(s) Oral every 6 hours PRN For Temp greater than 38 C (100.4 F)  acetaminophen   Tablet. 650 milliGRAM(s) Oral every 6 hours PRN Moderate Pain (4 - 6)  oxyCODONE    IR 5 milliGRAM(s) Oral every 4 hours PRN Severe Pain (7 - 10)  senna 2 Tablet(s) Oral at bedtime PRN Constipation      LABS:                        7.3    20.37 )-----------( 294      ( 2018 07:04 )             21.7     Hgb Trend: 7.3<--, 7.4<--, 7.6<--, 7.6<--, 7.7<--  04-14    132<L>  |  95<L>  |  17  ----------------------------<  107<H>  3.4<L>   |  25  |  0.70    Ca    8.1<L>      2018 05:21      Creatinine Trend: 0.70<--, 0.75<--, 0.77<--, 0.88<--, 0.84<--, 0.94<--  PT/INR - ( 2018 05:21 )   PT: 24.1 sec;   INR: 2.20 ratio         PTT - ( 2018 05:21 )  PTT:97.3 sec  Urinalysis Basic - ( 2018 14:23 )    Color: Yellow / Appearance: Slightly Turbid / S.015 / pH: x  Gluc: x / Ketone: Negative  / Bili: Negative / Urobili: Negative mg/dL   Blood: x / Protein: Negative mg/dL / Nitrite: Negative   Leuk Esterase: Small / RBC: 0 /HPF / WBC 29 /HPF   Sq Epi: x / Non Sq Epi: 2 /HPF / Bacteria: Negative

## 2018-04-14 NOTE — SWALLOW BEDSIDE ASSESSMENT ADULT - SWALLOW EVAL: RECOMMENDED DIET
Dysphagia 1 with honey thick liquid
From an oropharyngeal standpoint would recommend a Puree diet. Given known h/o GI issues and pt/aide report of frequent emesis post intake as well as laying supine post intake, would recommend consider GI consult to assess possible esophageal component of dysphagia.
Unable to make dietary recommendations 2/2 pt refusal. Defer to MD for dietary recommendations.

## 2018-04-14 NOTE — SWALLOW BEDSIDE ASSESSMENT ADULT - DIET PRIOR TO ADMI
Regular texture diet per HHA report and transfer notes from previous facility
Regular texture diet per HHA report and transfer notes from previous facility

## 2018-04-14 NOTE — PROGRESS NOTE ADULT - SUBJECTIVE AND OBJECTIVE BOX
Orthopaedic Surgery Progress Note    Subjective: Febrile overnight, No CP, no SOB. Sitting comfortably in chair     Objective:  Vital Signs Last 24 Hrs  T(C): 37.8 (14 Apr 2018 06:11), Max: 37.8 (13 Apr 2018 15:43)  T(F): 100 (14 Apr 2018 06:11), Max: 100.1 (13 Apr 2018 15:43)  HR: 93 (14 Apr 2018 06:11) (85 - 98)  BP: 128/57 (14 Apr 2018 06:11) (119/60 - 164/79)  BP(mean): --  RR: 18 (14 Apr 2018 06:11) (18 - 18)  SpO2: 95% (14 Apr 2018 06:11) (95% - 97%)    LABS:                        7.3    20.37 )-----------( 294      ( 14 Apr 2018 07:04 )             21.7     14 Apr 2018 05:21    132    |  95     |  17     ----------------------------<  107    3.4     |  25     |  0.70     Ca    8.1        14 Apr 2018 05:21      PT/INR - ( 14 Apr 2018 05:21 )   PT: 24.1 sec;   INR: 2.20 ratio         PTT - ( 14 Apr 2018 05:21 )  PTT:97.3 sec        PE  Exam:  Gen: NAD, erythema and swelling on medial, proximal thigh, covered with dressing  Motor: wiggles toes  Sensory: SILT DP/SP/S/S/T nerve distributions  Vascular: 2+ Dorsalis Pedis pulse  Dressing: Clean/dry/intact. In Andie brace

## 2018-04-14 NOTE — SWALLOW BEDSIDE ASSESSMENT ADULT - ASR SWALLOW ASPIRATION MONITOR
cough/gurgly voice/upper respiratory infection/fever/pneumonia/throat clearing/change of breathing pattern/Monitor for s/s aspiration/laryngeal penetration. If noted:  D/C p.o. intake, provide non-oral nutrition/hydration/meds, and contact this service @ [phone]

## 2018-04-14 NOTE — PROGRESS NOTE ADULT - ASSESSMENT
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   per ID: complete 6 weeks of iv daptomycin (end 5/5) and then prolonged po keflex and minocycline, local wound care per plastics     #Fever/sepsis- source likely multifocal pna vs prox thigh wound infection    Recent septic Rt TKR- abx spacer exchange and plastics complex wound closure   Supratherapeutic INR/coagulopathy- resolved   PMR on chr steroids    Plan  increasing leucocytosis, fevers  Cont Vanco and Aztreonam,   bld cx ngtd, f/u rpt bcx  ID f/u noted    s/p 1dose vitamin K, INR therapeutic 2.3  dc heparin, dose coumadin  cards cs appreciated-[facility], given ongoing [facility] ? need for ERIKA-r/o endocarditis  Pulm c/s appreciated  Ultrasound Rt thigh difficult study but no e/o collection  f/u plastics/surgery: wound care  f/u rheum

## 2018-04-14 NOTE — PROGRESS NOTE ADULT - SUBJECTIVE AND OBJECTIVE BOX
CC: f/u for pneumonia and R knee MRSA septic arthritis    Patient more alert, in chair, denies SOB, no diarrhea    REVIEW OF SYSTEMS:  All other review of systems negative (Comprehensive ROS)    Antimicrobials Day # 7, 21 total  aztreonam  IVPB 2000 milliGRAM(s) IV Intermittent every 8 hours  clotrimazole Lozenge 1 Lozenge Oral five times a day  vancomycin  IVPB 1000 milliGRAM(s) IV Intermittent every 12 hours    Other Medications Reviewed    Vital Signs Last 24 Hrs  T(F): 99.3 (14 Apr 2018 13:42), Max: 100 (13 Apr 2018 19:33)  HR: 89 (14 Apr 2018 13:42) (85 - 97)  BP: 123/56 (14 Apr 2018 13:42) (119/60 - 131/68)  BP(mean): --  RR: 18 (14 Apr 2018 13:42) (18 - 18)  SpO2: 97% (14 Apr 2018 13:42) (95% - 97%)    PHYSICAL EXAM:  General: alert, no acute distress  Eyes:  anicteric, no conjunctival injection, no discharge  Oropharynx: no lesions or injection 	  Neck: supple, without adenopathy  Lungs:  clear anteriorly  Heart: regular rate and rhythm; no murmur, rubs or gallops  Abdomen: soft, nondistended, nontender, without mass or organomegaly  [place]  Skin: no lesions  Extremities: dependent LE edema, L medial thigh edema/hematoma improved; still with ecchymosis, shallow blister R upper thigh, R knee immobilizer.  R PICC site clean  Neurologic: moves all extremities    LAB RESULTS:                        7.3    20.37 )-----------( 294      ( 14 Apr 2018 07:04 )             21.7   04-14    132<L>  |  95<L>  |  17  ----------------------------<  107<H>  3.4<L>   |  25  |  0.70    Ca    8.1<L>      14 Apr 2018 05:21    Vancomycin Level, Trough: 18.2    MICROBIOLOGY:  RECENT CULTURES:  Culture - Blood (04.13.18 @ 11:46)    Specimen Source: .Blood Blood-Peripheral    Culture Results:   No growth to date.    04-08 @ 21:46 .Blood Blood-Peripheral     No growth to date.    04-08 @ 21:44 .Urine Catheterized     <10,000 CFU/ml  Normal Urogenital roopa present      RADIOLOGY REVIEWED:  CT Abdomen and Pelvis No Cont (04.09.18 @ 22:31) >  Multifocal pneumonia.  No acute intra-abdominal pathology.  No evidence of retroperitoneal hematoma. Small subcutaneous hematoma   within the left lower anterior abdominal wall.      US Joint Nonvasc Extremity Limited, Right (04.12.18 @ 12:28) >  Limited examination secondary to patient body habitus. No discrete   collection within the right thigh.

## 2018-04-14 NOTE — SWALLOW BEDSIDE ASSESSMENT ADULT - SWALLOW EVAL: DIAGNOSIS
Patient with improved mentation on this date. Now presents with oral and suspected pharyngeal dysphagia characterized by delayed oral transit time and reduced A-P transport, per pt reportedly 2/2 oral "sores," and inconsistently delayed pharyngeal swallow trigger. No s/s of laryngeal penetration and/or aspiration evident on exam.

## 2018-04-14 NOTE — PROGRESS NOTE ADULT - ATTENDING COMMENTS
I agree with the above note and have personally seen and examined this patient. All pertinent films have been reviewed. Please refer to clinical documentation of the history, physical examinations, data summary, and both assessment and plan as documented above and with which I agree.    oob first time today in >2 weeks  please involve consulting psych for depression counseling, need positive encouragement for patient to keep her with positive mental status and encourage her to spend majority of bed oob in bedside chair as able  remains medically ill, increasingly elevated wbc  more awake today  remainder vitals stable, inr resolved and now therapeutic range  agree with ERIKA to check for vegetations  no surgical intervention at this time  I had a >60 minute discussion today with the patients , son and 2 grandchildren about the entire course of her illness including how she potentially developed a PJI (dental infection) and her risk factors for poor outcome as well as why she has been having a difficult postoperative course. We discussed some next steps and various teams that are involved in the care of this patient.    Nate Bass MD  Attending Orthopedic Surgeon .

## 2018-04-14 NOTE — PROGRESS NOTE ADULT - ASSESSMENT
79 yo woman with CAD, s/p TAVR, s/p TKR with recurrent knee infections. Now admitted with MRSA septic arthritis. On Vanco and Aztreonam with uptrending white count and persistent low grade fevers.   -- agree with concern for endocarditis and need for repeat BCX and ERIKA  -- c/w Vanco and Aztreonam per ID - no clinical evidence of PNA but would cover any kind of aspiration event  -- incentive spirometry, chest PT  -- c/w Symbicort and Spiriva

## 2018-04-14 NOTE — SWALLOW BEDSIDE ASSESSMENT ADULT - MUCOSAL QUALITY
+ulcerations noted on hard palate, pt reports feeling "the same kind of sore" on her tongue, endorses pain 2/2 same. NP Viv aware. MD please assess as indicated.
WFL

## 2018-04-14 NOTE — SWALLOW BEDSIDE ASSESSMENT ADULT - CONSISTENCIES ADMINISTERED
pt accepted ~2 oz of each/puree thin/honey thick/nectar thick/puree thick pt accepted >4 oz/thin liquid mech soft

## 2018-04-14 NOTE — PROGRESS NOTE ADULT - ASSESSMENT
A/P: 77 year old female s/p R knee stage 1 revision  Pain Control  50% PWB in brace, no knee flexion allowed, may remove brace while in bed, brace to stay at least until outpatient follow-up  PT/OT, oob to chair  Abx per ID, f/u fever workup  Wound care per plastics team  DVT ppx per medicine/cards	  Med.Cards/Pulm/ID care appreciated  Will continue to follow

## 2018-04-14 NOTE — SWALLOW BEDSIDE ASSESSMENT ADULT - ADDITIONAL RECOMMENDATIONS
Maintain good oral hygiene.   POC pending MBS.
Maintain good oral hygiene.  Discussed with pt, HHA and MARSHA Hinson. This service will follow up to re-assess the oropharyngeal swallow mechanism and determine least restrictive diet pending improved pt participation.
Maintain good oral hygiene.

## 2018-04-14 NOTE — SWALLOW BEDSIDE ASSESSMENT ADULT - POSITIONING
Initially HHA placed HOB 45 degrees; HHA educated re: upright 90 degree positioning. Pt placed at 90 degrees by clinician
upright (90 degrees)
upright (90 degrees)

## 2018-04-14 NOTE — PROGRESS NOTE ADULT - SUBJECTIVE AND OBJECTIVE BOX
Patient is a 77y old  Female who presents with a chief complaint of fever (11 Apr 2018 14:25)  pt seen and examined at bedside   SUBJECTIVE/OVERNIGHT events noted, feels well, oob to chair    Vital Signs Last 24 Hrs  T(C): 37.4 (14 Apr 2018 13:42), Max: 37.8 (13 Apr 2018 15:43)  T(F): 99.3 (14 Apr 2018 13:42), Max: 100.1 (13 Apr 2018 15:43)  HR: 89 (14 Apr 2018 13:42) (85 - 98)  BP: 123/56 (14 Apr 2018 13:42) (119/60 - 164/79)  BP(mean): --  RR: 18 (14 Apr 2018 13:42) (18 - 18)  SpO2: 97% (14 Apr 2018 13:42) (95% - 97%)  CAPILLARY BLOOD GLUCOSE        MEDICATIONS  (STANDING):  ascorbic acid 500 milliGRAM(s) Oral daily  aspirin enteric coated 81 milliGRAM(s) Oral daily  aztreonam  IVPB 2000 milliGRAM(s) IV Intermittent every 8 hours  buDESOnide 160 MICROgram(s)/formoterol 4.5 MICROgram(s) Inhaler 2 Puff(s) Inhalation two times a day  calcium carbonate 1250 mG + Vitamin D (OsCal 500 + D) 1 Tablet(s) Oral three times a day  clotrimazole Lozenge 1 Lozenge Oral five times a day  docusate sodium 100 milliGRAM(s) Oral three times a day  ferrous    sulfate 325 milliGRAM(s) Oral daily  folic acid 1 milliGRAM(s) Oral daily  multivitamin 1 Tablet(s) Oral daily  pantoprazole    Tablet 40 milliGRAM(s) Oral before breakfast  polyethylene glycol 3350 17 Gram(s) Oral daily  povidone iodine 10% Swab 1 Application(s) Topical daily  silver sulfADIAZINE 1% Cream 1 Application(s) Topical daily  tiotropium 18 MICROgram(s) Capsule 1 Capsule(s) Inhalation daily  vancomycin  IVPB 1000 milliGRAM(s) IV Intermittent every 12 hours  warfarin 2 milliGRAM(s) Oral once    MEDICATIONS  (PRN):  acetaminophen   Tablet 650 milliGRAM(s) Oral every 6 hours PRN For Temp greater than 38 C (100.4 F)  acetaminophen   Tablet. 650 milliGRAM(s) Oral every 6 hours PRN Moderate Pain (4 - 6)  oxyCODONE    IR 5 milliGRAM(s) Oral every 4 hours PRN Severe Pain (7 - 10)  senna 2 Tablet(s) Oral at bedtime PRN Constipation    PHYSICAL EXAM  General : comfortable, not in any acute distress  Neck : supple, no LAD, no JVD  Eyes : EOMI, PERRLA, conjunctiva : no icterus, no pallor  MM moist, no pharyngeal erythema/exudates  Pulmonary: clear to auscultation bilateral lung fields, no crackles/rhonchi/wheezing,   CVS : S1 S2,rate normal-,rhythm-regular, no murmurs, no abnormal sounds  Gastrointestinal: soft, non tender, non distended, no organomegaly , bowel sounds audible  Extremities: rt proximal thigh wound dressing+, rt knee brace  Neuro: AAOx3, no focal deficits  Musculoskeletal:  FROM of all ext  Skin: no rash  LABS                        7.3    20.37 )-----------( 294      ( 14 Apr 2018 07:04 )             21.7     04-14    132<L>  |  95<L>  |  17  ----------------------------<  107<H>  3.4<L>   |  25  |  0.70    Ca    8.1<L>      14 Apr 2018 05:21        Culture - Blood (collected 13 Apr 2018 11:46)  Source: .Blood Blood-Peripheral  Preliminary Report (14 Apr 2018 12:01):    No growth to date.    Culture - Blood (collected 13 Apr 2018 11:46)  Source: .Blood Blood-Peripheral  Preliminary Report (14 Apr 2018 12:01):    No growth to date.      RADIOLOGY and IMAGING reviewed: yes  Consultant notes reviewed : yes  Care discussed with Consultants/other providers :

## 2018-04-14 NOTE — PROGRESS NOTE ADULT - ASSESSMENT
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    She returned to rehab on IV Dapto, but sent back with fever, leukocytosis and found to have multifocal pneumonia.   On vanco and aztreonam- Cxs negative, but leukocytosis worse  Dependent edema and thigh hematoma, no discreet collection on Sono, but might question hematoma as link to low grade fever and reactive leukocytosis- H/H slowly dropping  Vanco level therapeutic  More alert today. temps lower; less compelled to implicate new infection    Plan:  Continue present Vanco + Aztreo directed at pneumonia and MRSA R knee infex- last day today- switch back to Dapto alone tomorrow  Follow temps and CBC/diff   D/w pt, aide, and family at bedside- sequencing of abx, low grade fever, leukocytosis, risk for new infection, all reviewed  D/w Dr Bass

## 2018-04-14 NOTE — SWALLOW BEDSIDE ASSESSMENT ADULT - SWALLOW EVAL: PATIENT/FAMILY GOALS STATEMENT
HHA reports pt tolerated a regular texture diet PTA without difficulty.
Patient and HHA at bedside endorse frequent vomiting post PO intake, per pt reportedly 2/2 h/o gastroplasty. Pt also reports she lays down after eating, and that despite being educated re: risks of same that "[she] does what [she] wants." Denies h/o PNA PTA.

## 2018-04-14 NOTE — SWALLOW BEDSIDE ASSESSMENT ADULT - SLP GENERAL OBSERVATIONS
Pt received in bed. Reduced arousal. Reduced participation. Pt refusing assessment at this time. Per HHA, pt sleepy at this time as she had an ultrasound earlier today and had multiple bowel movements, one just prior to this assessment. Therefore, pt very tired.
Patient encountered OOB in chair, HHA at bedside, +O2 via NC. Pt A&O grossly x3, able to make wants and needs known and follow directives for purpose of evaluation. Episodic latency in responses noted. Speech and language grossly WNL in conversation.
Pt received in bed. Grossly A&Ox3 (to self, to year, to place). Disoriented to month. Pt with reduced arousal. + intermittent eye closing during exam. Pt required continual encouragement for participation due to reduced participation. Inconsistent command following, suspect component of reduced effort.

## 2018-04-14 NOTE — SWALLOW BEDSIDE ASSESSMENT ADULT - ORAL PHASE
Delayed oral transit time/stasis in anterior sulcus with thick puree, cleared via cued repeat swallow or liquid wash Within functional limits Delayed oral transit time/Lingual stasis/pt reports delayed oral transit time 2/2 pain from lingual "sores"

## 2018-04-14 NOTE — SWALLOW BEDSIDE ASSESSMENT ADULT - SWALLOW EVAL: RECOMMENDED FEEDING/EATING TECHNIQUES
position upright (90 degrees)/maintain upright posture during/after eating for 30 mins/crush medication (when feasible)/no straws/alternate food with liquid/small sips/bites

## 2018-04-14 NOTE — SWALLOW BEDSIDE ASSESSMENT ADULT - ASR SWALLOW RECOMMEND DIAG
VFSS/MBS/Given ddx includes aspiration PNA, can consider MBS to objectively assess as cannot r/o silent aspiration subjectively at bedside. VFSS/MBS/Given ddx includes aspiration PNA, recommend MBS to objectively assess as cannot r/o silent aspiration subjectively at bedside. MD, PLEASE ENTER ORDER FOR MODIFIED BARIUM SWALLOW STUDY (ENTER UNDER RADIOLOGY EXAMS THE FOLLOWING WAY: GO TO THE BROWSER AND TYPE IN XRAY, THEN HIT THE SPACEBAR, AND TYPE IN THE LETTER M.)  WILL SCHEDULE EXAM UPON RECEIPT IN RADIOLOGY.

## 2018-04-15 LAB
APTT BLD: 39.8 SEC — HIGH (ref 27.5–37.4)
HCT VFR BLD CALC: 24.1 % — LOW (ref 34.5–45)
HGB BLD-MCNC: 7.7 G/DL — LOW (ref 11.5–15.5)
INR BLD: 2.16 RATIO — HIGH (ref 0.88–1.16)
MCHC RBC-ENTMCNC: 27.2 PG — SIGNIFICANT CHANGE UP (ref 27–34)
MCHC RBC-ENTMCNC: 32 GM/DL — SIGNIFICANT CHANGE UP (ref 32–36)
MCV RBC AUTO: 85.2 FL — SIGNIFICANT CHANGE UP (ref 80–100)
PLATELET # BLD AUTO: 357 K/UL — SIGNIFICANT CHANGE UP (ref 150–400)
PROTHROM AB SERPL-ACNC: 24.8 SEC — HIGH (ref 10–13.1)
RBC # BLD: 2.83 M/UL — LOW (ref 3.8–5.2)
RBC # FLD: 16.9 % — HIGH (ref 10.3–14.5)
WBC # BLD: 18.29 K/UL — HIGH (ref 3.8–10.5)
WBC # FLD AUTO: 18.29 K/UL — HIGH (ref 3.8–10.5)

## 2018-04-15 RX ORDER — WARFARIN SODIUM 2.5 MG/1
2 TABLET ORAL ONCE
Qty: 0 | Refills: 0 | Status: COMPLETED | OUTPATIENT
Start: 2018-04-15 | End: 2018-04-15

## 2018-04-15 RX ADMIN — OXYCODONE HYDROCHLORIDE 5 MILLIGRAM(S): 5 TABLET ORAL at 12:54

## 2018-04-15 RX ADMIN — OXYCODONE HYDROCHLORIDE 5 MILLIGRAM(S): 5 TABLET ORAL at 09:33

## 2018-04-15 RX ADMIN — Medication 1 TABLET(S): at 21:26

## 2018-04-15 RX ADMIN — Medication 1 LOZENGE: at 12:54

## 2018-04-15 RX ADMIN — Medication 1 TABLET(S): at 06:18

## 2018-04-15 RX ADMIN — WARFARIN SODIUM 2 MILLIGRAM(S): 2.5 TABLET ORAL at 21:26

## 2018-04-15 RX ADMIN — Medication 1 TABLET(S): at 12:55

## 2018-04-15 RX ADMIN — OXYCODONE HYDROCHLORIDE 5 MILLIGRAM(S): 5 TABLET ORAL at 11:11

## 2018-04-15 RX ADMIN — Medication 1 APPLICATION(S): at 12:47

## 2018-04-15 RX ADMIN — Medication 81 MILLIGRAM(S): at 09:33

## 2018-04-15 RX ADMIN — TIOTROPIUM BROMIDE 1 CAPSULE(S): 18 CAPSULE ORAL; RESPIRATORY (INHALATION) at 12:54

## 2018-04-15 RX ADMIN — Medication 1 LOZENGE: at 20:38

## 2018-04-15 RX ADMIN — Medication 1 APPLICATION(S): at 09:28

## 2018-04-15 RX ADMIN — OXYCODONE HYDROCHLORIDE 5 MILLIGRAM(S): 5 TABLET ORAL at 13:32

## 2018-04-15 RX ADMIN — Medication 100 MILLIGRAM(S): at 12:55

## 2018-04-15 RX ADMIN — Medication 1 TABLET(S): at 09:38

## 2018-04-15 RX ADMIN — Medication 325 MILLIGRAM(S): at 09:33

## 2018-04-15 RX ADMIN — Medication 100 MILLIGRAM(S): at 21:26

## 2018-04-15 RX ADMIN — PANTOPRAZOLE SODIUM 40 MILLIGRAM(S): 20 TABLET, DELAYED RELEASE ORAL at 06:19

## 2018-04-15 RX ADMIN — DAPTOMYCIN 132 MILLIGRAM(S): 500 INJECTION, POWDER, LYOPHILIZED, FOR SOLUTION INTRAVENOUS at 00:04

## 2018-04-15 RX ADMIN — Medication 1 MILLIGRAM(S): at 09:33

## 2018-04-15 RX ADMIN — BUDESONIDE AND FORMOTEROL FUMARATE DIHYDRATE 2 PUFF(S): 160; 4.5 AEROSOL RESPIRATORY (INHALATION) at 06:18

## 2018-04-15 RX ADMIN — Medication 500 MILLIGRAM(S): at 09:33

## 2018-04-15 RX ADMIN — Medication 100 MILLIGRAM(S): at 06:19

## 2018-04-15 NOTE — PROGRESS NOTE ADULT - ASSESSMENT
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   per ID: complete 6 weeks of iv daptomycin (end 5/5) and then prolonged po keflex and minocycline, local wound care per plastics     #Fever/sepsis- source likely multifocal pna vs prox thigh wound infection    Recent septic Rt TKR- abx spacer exchange and plastics complex wound closure   Supratherapeutic INR/coagulopathy- resolved   PMR on chr steroids    Plan  improving leucocytosis, fever pattern  Conmpleted Vanco and Aztreonam 7d course  switched to Dapto to cont  bld cx ngtd, f/u rpt bcx ngtd  ID f/u noted    s/p 1dose vitamin K, INR therapeutic 2.3  dose coumadin  cards cs appreciated-pt has no mech MVR, given ongoing fevrs ? need for ERIKA-r/o endocarditis  Pulm c/s appreciated  Ultrasound Rt thigh difficult study but no e/o collection  f/u plastics/surgery: wound care  d/w Ortho Dr Lambert plan of care

## 2018-04-15 NOTE — PROGRESS NOTE ADULT - SUBJECTIVE AND OBJECTIVE BOX
CC: f/u for pneumonia and R knee MRSA septic arthritis    Patient remains more alert, denies SOB    REVIEW OF SYSTEMS:  All other review of systems negative (Comprehensive ROS)    Antimicrobials Day # 22 total  Other Medications Reviewed    Vital Signs Last 24 Hrs  T(F): 99.3 (15 Apr 2018 10:36), Max: 99.5 (14 Apr 2018 23:55)  HR: 94 (15 Apr 2018 10:36) (85 - 94)  BP: 130/63 (15 Apr 2018 10:36) (130/63 - 151/67)  RR: 18 (15 Apr 2018 10:36) (18 - 18)  SpO2: 96% (15 Apr 2018 10:36) (96% - 100%)    PHYSICAL EXAM:  General: alert, no acute distress  Eyes:  anicteric, no conjunctival injection, no discharge  Oropharynx: no lesions or injection 	  Neck: supple, without adenopathy  Lungs:  clear anteriorly  Heart: regular rate and rhythm; no murmur, rubs or gallops  Abdomen: soft, nondistended, nontender, without mass or organomegaly  [place]  Skin: no lesions  Extremities: dependent LE edema, L medial thigh edema/hematoma resolved; still with ecchymosis, shallow blister R upper thigh, R knee incision intact and dry, large medial eschar, black necrosis.  R PICC site clean  Neurologic: alert, moves all extremities    LAB RESULTS:                        7.7    18.29 )-----------( 357      ( 15 Apr 2018 09:15 )             24.1   04-14    132<L>  |  95<L>  |  17  ----------------------------<  107<H>  3.4<L>   |  25  |  0.70    Ca    8.1<L>      14 Apr 2018 05:21      MICROBIOLOGY:  RECENT CULTURES:  Culture - Blood (04.13.18 @ 11:46)    Specimen Source: .Blood Blood-Peripheral    Culture Results:   No growth to date.    04-08 @ 21:46 .Blood Blood-Peripheral     No growth to date.    04-08 @ 21:44 .Urine Catheterized     <10,000 CFU/ml  Normal Urogenital roopa present      RADIOLOGY REVIEWED:  CT Abdomen and Pelvis No Cont (04.09.18 @ 22:31) >  Multifocal pneumonia.  No acute intra-abdominal pathology.  No evidence of retroperitoneal hematoma. Small subcutaneous hematoma   within the left lower anterior abdominal wall.      US Joint Nonvasc Extremity Limited, Right (04.12.18 @ 12:28) >  Limited examination secondary to patient body habitus. No discrete   collection within the right thigh.

## 2018-04-15 NOTE — PROGRESS NOTE ADULT - ATTENDING COMMENTS
I agree with the above note and have personally seen and examined this patient. All pertinent films have been reviewed. Please refer to clinical documentation of the history, physical examinations, data summary, and both assessment and plan as documented above and with which I agree.    pain controlled, awake and alert    RLE  thigh swelling continues to improve and soften, no erythema  surgical wound intact however eschar now has small area of purulent discharge  no surrounding erythema around wound, minimal swelling  wound dressed with xeroform, gauze, ace  5/5 ta/ehl/gcs  silt l4-s1  2+ dp pulse    no fevers >101F for more than 48 hours now, wbc improved from yesterday but still elevated  concerned about new purulent drainage from surgical wound eschar, will discuss with plastic surgery team, may require repeat I&D however patient  further work up and discussion needed to consider options of continued IV antibiotic treatment versus repeat I&D as spacer is eluding antibiotics into the joint to treat local infection which the bacteria is sensitive to and also patient on IV abx per ID team, if return to OR will likely require flap from plastics team  **please involve consulting psych for depression counseling, need positive encouragement for patient to keep her with positive mental status and encourage her to spend majority of bed oob in bedside chair as able  remains medically ill, need for continued hospitalization and monitoring  inr resolved and now therapeutic range on coumadin  agree with ERIKA to check for vegetations  continue to optimize nutrition  may need repeat CT RLE to eval for fluid collection given limited utility of u/s exam, however no areas of fluctuance palpable around knee joint area    I had a 20 minute discussion today with the patients  and aid as well as patient and internal medicine team regarding the elevated wbc, new purulence, but lack of fevers. We discussed various treatment options as listed above including repeat I&D versus continued monitoring on IV abx. Will seek consultation from another orthopedic surgeon as well for independent opinion likely tomorrow given that this patient has had a difficult postoperative course and has many medical comorbidities increasing surgical risks.     Nate Bass MD  Attending Orthopedic Surgeon .

## 2018-04-15 NOTE — PROGRESS NOTE ADULT - ASSESSMENT
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    She returned to rehab on IV Dapto, but sent back with fever, leukocytosis and found to have multifocal pneumonia.   Completed 7 days vanco and aztreonam- Cxs negative  Dependent edema and thigh hematoma, no discreet collection on Sono, but might question hematoma as link to low grade fever and reactive leukocytosis- H/H slowly dropping  Remains more alert, afebrile past 24 hrs, leukocytosis less; less compelled to implicate new infection    Plan:  Continue Dapto alone directed at MRSA knee infex  Follow temps and CBC/diff   D/w pt, aide, and family at bedside

## 2018-04-15 NOTE — PROGRESS NOTE ADULT - SUBJECTIVE AND OBJECTIVE BOX
Orthopaedic Surgery Progress Note    Subjective: Febrile overnight, No CP, no SOB. Sitting comfortably in chair     Vital Signs Last 24 Hrs  T(C): 36.6 (15 Apr 2018 06:16), Max: 37.5 (14 Apr 2018 23:55)  T(F): 97.9 (15 Apr 2018 06:16), Max: 99.5 (14 Apr 2018 23:55)  HR: 93 (15 Apr 2018 06:16) (85 - 93)  BP: 148/74 (15 Apr 2018 06:16) (123/56 - 151/67)  BP(mean): --  RR: 18 (15 Apr 2018 06:16) (18 - 18)  SpO2: 100% (15 Apr 2018 06:16) (97% - 100%)                          7.7    18.29 )-----------( 357      ( 15 Apr 2018 09:15 )             24.1   04-14    132<L>  |  95<L>  |  17  ----------------------------<  107<H>  3.4<L>   |  25  |  0.70    Ca    8.1<L>      14 Apr 2018 05:21      PE  Exam:  Gen: NAD, erythema and swelling on medial, proximal thigh, covered with dressing  Motor: wiggles toes  Sensory: SILT DP/SP/S/S/T nerve distributions  Vascular: 2+ Dorsalis Pedis pulse  Dressing: Clean/dry/intact. In [place] brace Orthopaedic Surgery Progress Note    Subjective: afebrile, No CP, no SOB. Sitting comfortably in chair     Vital Signs Last 24 Hrs  T(C): 36.6 (15 Apr 2018 06:16), Max: 37.5 (14 Apr 2018 23:55)  T(F): 97.9 (15 Apr 2018 06:16), Max: 99.5 (14 Apr 2018 23:55)  HR: 93 (15 Apr 2018 06:16) (85 - 93)  BP: 148/74 (15 Apr 2018 06:16) (123/56 - 151/67)  BP(mean): --  RR: 18 (15 Apr 2018 06:16) (18 - 18)  SpO2: 100% (15 Apr 2018 06:16) (97% - 100%)                          7.7    18.29 )-----------( 357      ( 15 Apr 2018 09:15 )             24.1   04-14    132<L>  |  95<L>  |  17  ----------------------------<  107<H>  3.4<L>   |  25  |  0.70    Ca    8.1<L>      14 Apr 2018 05:21      PE  Exam:  Gen: NAD, erythema and swelling on medial, proximal thigh, covered with dressing  Motor: wiggles toes  Sensory: SILT DP/SP/S/S/T nerve distributions  Vascular: 2+ Dorsalis Pedis pulse  Dressing: Clean/dry/intact. In [place]ie brace

## 2018-04-15 NOTE — PROGRESS NOTE ADULT - SUBJECTIVE AND OBJECTIVE BOX
Patient is a 77y old  Female who presents with a chief complaint of fever (11 Apr 2018 14:25)  pt seen and examined at bedside   SUBJECTIVE/OVERNIGHT events noted, c/o pain rt leg    Vital Signs Last 24 Hrs  T(C): 37.1 (15 Apr 2018 16:28), Max: 37.5 (14 Apr 2018 23:55)  T(F): 98.8 (15 Apr 2018 16:28), Max: 99.5 (14 Apr 2018 23:55)  HR: 83 (15 Apr 2018 16:28) (83 - 94)  BP: 132/73 (15 Apr 2018 16:28) (130/63 - 151/67)  BP(mean): --  RR: 18 (15 Apr 2018 16:28) (18 - 18)  SpO2: 95% (15 Apr 2018 16:28) (95% - 100%)  CAPILLARY BLOOD GLUCOSE        MEDICATIONS  (STANDING):  ascorbic acid 500 milliGRAM(s) Oral daily  aspirin enteric coated 81 milliGRAM(s) Oral daily  buDESOnide 160 MICROgram(s)/formoterol 4.5 MICROgram(s) Inhaler 2 Puff(s) Inhalation two times a day  calcium carbonate 1250 mG + Vitamin D (OsCal 500 + D) 1 Tablet(s) Oral three times a day  clotrimazole Lozenge 1 Lozenge Oral five times a day  DAPTOmycin IVPB 800 milliGRAM(s) IV Intermittent every 24 hours  docusate sodium 100 milliGRAM(s) Oral three times a day  ferrous    sulfate 325 milliGRAM(s) Oral daily  folic acid 1 milliGRAM(s) Oral daily  multivitamin 1 Tablet(s) Oral daily  pantoprazole    Tablet 40 milliGRAM(s) Oral before breakfast  polyethylene glycol 3350 17 Gram(s) Oral daily  povidone iodine 10% Swab 1 Application(s) Topical daily  silver sulfADIAZINE 1% Cream 1 Application(s) Topical daily  tiotropium 18 MICROgram(s) Capsule 1 Capsule(s) Inhalation daily  warfarin 2 milliGRAM(s) Oral once    MEDICATIONS  (PRN):  acetaminophen   Tablet 650 milliGRAM(s) Oral every 6 hours PRN For Temp greater than 38 C (100.4 F)  acetaminophen   Tablet. 650 milliGRAM(s) Oral every 6 hours PRN Moderate Pain (4 - 6)  lidocaine 2% Viscous 10 milliLiter(s) Swish and Spit every 6 hours PRN Mouth Sores  oxyCODONE    IR 5 milliGRAM(s) Oral every 4 hours PRN Severe Pain (7 - 10)  senna 2 Tablet(s) Oral at bedtime PRN Constipation    PHYSICAL EXAM  General : comfortable, not in any acute distress  Neck : supple, no LAD, no JVD  Eyes : EOMI, PERRLA, conjunctiva : no icterus, no pallor  MM moist, no pharyngeal erythema/exudates  Pulmonary: clear to auscultation bilateral lung fields, no crackles/rhonchi/wheezing,   CVS : S1 S2,rate normal-,rhythm-regular, no murmurs, no abnormal sounds  Gastrointestinal: soft, non tender, non distended, no organomegaly , bowel sounds audible  Extremities: rt knee dressing, ace wrapped, rt prox thigh wound dressing +  less erythematous  Neuro: AAOx3, no focal deficits  Musculoskeletal:  FROM of all ext  Skin: no rash  LABS                        7.7    18.29 )-----------( 357      ( 15 Apr 2018 09:15 )             24.1     04-14    132<L>  |  95<L>  |  17  ----------------------------<  107<H>  3.4<L>   |  25  |  0.70    Ca    8.1<L>      14 Apr 2018 05:21        Culture - Blood (collected 13 Apr 2018 11:46)  Source: .Blood Blood-Peripheral  Preliminary Report (14 Apr 2018 12:01):    No growth to date.    Culture - Blood (collected 13 Apr 2018 11:46)  Source: .Blood Blood-Peripheral  Preliminary Report (14 Apr 2018 12:01):    No growth to date.      RADIOLOGY and IMAGING reviewed: yes  Consultant notes reviewed : yes  Care discussed with Consultants/other providers :

## 2018-04-16 LAB
ANION GAP SERPL CALC-SCNC: 12 MMOL/L — SIGNIFICANT CHANGE UP (ref 5–17)
BUN SERPL-MCNC: 12 MG/DL — SIGNIFICANT CHANGE UP (ref 7–23)
CALCIUM SERPL-MCNC: 8.3 MG/DL — LOW (ref 8.4–10.5)
CHLORIDE SERPL-SCNC: 99 MMOL/L — SIGNIFICANT CHANGE UP (ref 96–108)
CO2 SERPL-SCNC: 24 MMOL/L — SIGNIFICANT CHANGE UP (ref 22–31)
CREAT SERPL-MCNC: 0.58 MG/DL — SIGNIFICANT CHANGE UP (ref 0.5–1.3)
GLUCOSE SERPL-MCNC: 93 MG/DL — SIGNIFICANT CHANGE UP (ref 70–99)
HCT VFR BLD CALC: 22.4 % — LOW (ref 34.5–45)
HGB BLD-MCNC: 7.2 G/DL — LOW (ref 11.5–15.5)
INR BLD: 2.6 RATIO — HIGH (ref 0.88–1.16)
MCHC RBC-ENTMCNC: 27.5 PG — SIGNIFICANT CHANGE UP (ref 27–34)
MCHC RBC-ENTMCNC: 32.1 GM/DL — SIGNIFICANT CHANGE UP (ref 32–36)
MCV RBC AUTO: 85.5 FL — SIGNIFICANT CHANGE UP (ref 80–100)
PLATELET # BLD AUTO: 349 K/UL — SIGNIFICANT CHANGE UP (ref 150–400)
POTASSIUM SERPL-MCNC: 3.7 MMOL/L — SIGNIFICANT CHANGE UP (ref 3.5–5.3)
POTASSIUM SERPL-SCNC: 3.7 MMOL/L — SIGNIFICANT CHANGE UP (ref 3.5–5.3)
PROTHROM AB SERPL-ACNC: 29.9 SEC — HIGH (ref 10–13.1)
RBC # BLD: 2.62 M/UL — LOW (ref 3.8–5.2)
RBC # FLD: 17.3 % — HIGH (ref 10.3–14.5)
SODIUM SERPL-SCNC: 135 MMOL/L — SIGNIFICANT CHANGE UP (ref 135–145)
WBC # BLD: 16.16 K/UL — HIGH (ref 3.8–10.5)
WBC # FLD AUTO: 16.16 K/UL — HIGH (ref 3.8–10.5)

## 2018-04-16 PROCEDURE — 99232 SBSQ HOSP IP/OBS MODERATE 35: CPT

## 2018-04-16 RX ORDER — ACETAMINOPHEN 500 MG
650 TABLET ORAL EVERY 6 HOURS
Qty: 0 | Refills: 0 | Status: DISCONTINUED | OUTPATIENT
Start: 2018-04-16 | End: 2018-05-15

## 2018-04-16 RX ORDER — POVIDONE-IODINE 5 %
1 AEROSOL (ML) TOPICAL THREE TIMES A DAY
Qty: 0 | Refills: 0 | Status: DISCONTINUED | OUTPATIENT
Start: 2018-04-16 | End: 2018-05-05

## 2018-04-16 RX ORDER — WARFARIN SODIUM 2.5 MG/1
1 TABLET ORAL ONCE
Qty: 0 | Refills: 0 | Status: COMPLETED | OUTPATIENT
Start: 2018-04-16 | End: 2018-04-16

## 2018-04-16 RX ORDER — NYSTATIN 500MM UNIT
500000 POWDER (EA) MISCELLANEOUS EVERY 6 HOURS
Qty: 0 | Refills: 0 | Status: DISCONTINUED | OUTPATIENT
Start: 2018-04-16 | End: 2018-06-06

## 2018-04-16 RX ORDER — OXYCODONE HYDROCHLORIDE 5 MG/1
5 TABLET ORAL EVERY 4 HOURS
Qty: 0 | Refills: 0 | Status: DISCONTINUED | OUTPATIENT
Start: 2018-04-16 | End: 2018-04-16

## 2018-04-16 RX ORDER — HYDROMORPHONE HYDROCHLORIDE 2 MG/ML
0.5 INJECTION INTRAMUSCULAR; INTRAVENOUS; SUBCUTANEOUS EVERY 6 HOURS
Qty: 0 | Refills: 0 | Status: DISCONTINUED | OUTPATIENT
Start: 2018-04-16 | End: 2018-04-17

## 2018-04-16 RX ADMIN — Medication 500000 UNIT(S): at 22:13

## 2018-04-16 RX ADMIN — Medication 500 MILLIGRAM(S): at 13:58

## 2018-04-16 RX ADMIN — Medication 1 TABLET(S): at 22:13

## 2018-04-16 RX ADMIN — OXYCODONE HYDROCHLORIDE 5 MILLIGRAM(S): 5 TABLET ORAL at 07:00

## 2018-04-16 RX ADMIN — Medication 325 MILLIGRAM(S): at 13:59

## 2018-04-16 RX ADMIN — WARFARIN SODIUM 1 MILLIGRAM(S): 2.5 TABLET ORAL at 22:13

## 2018-04-16 RX ADMIN — PANTOPRAZOLE SODIUM 40 MILLIGRAM(S): 20 TABLET, DELAYED RELEASE ORAL at 05:46

## 2018-04-16 RX ADMIN — OXYCODONE HYDROCHLORIDE 5 MILLIGRAM(S): 5 TABLET ORAL at 06:03

## 2018-04-16 RX ADMIN — OXYCODONE HYDROCHLORIDE 5 MILLIGRAM(S): 5 TABLET ORAL at 15:08

## 2018-04-16 RX ADMIN — TIOTROPIUM BROMIDE 1 CAPSULE(S): 18 CAPSULE ORAL; RESPIRATORY (INHALATION) at 13:58

## 2018-04-16 RX ADMIN — DAPTOMYCIN 132 MILLIGRAM(S): 500 INJECTION, POWDER, LYOPHILIZED, FOR SOLUTION INTRAVENOUS at 05:45

## 2018-04-16 RX ADMIN — HYDROMORPHONE HYDROCHLORIDE 0.5 MILLIGRAM(S): 2 INJECTION INTRAMUSCULAR; INTRAVENOUS; SUBCUTANEOUS at 18:56

## 2018-04-16 RX ADMIN — Medication 1 TABLET(S): at 13:57

## 2018-04-16 RX ADMIN — Medication 1 MILLIGRAM(S): at 13:57

## 2018-04-16 RX ADMIN — Medication 1 APPLICATION(S): at 13:55

## 2018-04-16 RX ADMIN — Medication 100 MILLIGRAM(S): at 13:57

## 2018-04-16 RX ADMIN — Medication 100 MILLIGRAM(S): at 05:45

## 2018-04-16 RX ADMIN — BUDESONIDE AND FORMOTEROL FUMARATE DIHYDRATE 2 PUFF(S): 160; 4.5 AEROSOL RESPIRATORY (INHALATION) at 18:06

## 2018-04-16 RX ADMIN — Medication 81 MILLIGRAM(S): at 13:58

## 2018-04-16 RX ADMIN — OXYCODONE HYDROCHLORIDE 5 MILLIGRAM(S): 5 TABLET ORAL at 16:50

## 2018-04-16 RX ADMIN — Medication 1 LOZENGE: at 13:57

## 2018-04-16 RX ADMIN — HYDROMORPHONE HYDROCHLORIDE 0.5 MILLIGRAM(S): 2 INJECTION INTRAMUSCULAR; INTRAVENOUS; SUBCUTANEOUS at 18:07

## 2018-04-16 RX ADMIN — Medication 100 MILLIGRAM(S): at 22:13

## 2018-04-16 RX ADMIN — BUDESONIDE AND FORMOTEROL FUMARATE DIHYDRATE 2 PUFF(S): 160; 4.5 AEROSOL RESPIRATORY (INHALATION) at 05:45

## 2018-04-16 RX ADMIN — OXYCODONE HYDROCHLORIDE 5 MILLIGRAM(S): 5 TABLET ORAL at 10:19

## 2018-04-16 RX ADMIN — OXYCODONE HYDROCHLORIDE 5 MILLIGRAM(S): 5 TABLET ORAL at 11:55

## 2018-04-16 RX ADMIN — Medication 1 TABLET(S): at 05:45

## 2018-04-16 RX ADMIN — Medication 650 MILLIGRAM(S): at 18:06

## 2018-04-16 NOTE — PROGRESS NOTE ADULT - ASSESSMENT
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   per ID: complete 6 weeks of iv daptomycin (end 5/5) and then prolonged po keflex and minocycline, local wound care per plastics     #Fever/sepsis-   Recent septic Rt TKR- abx spacer exchange and plastics complex wound closure  multifocal pna  prox thigh swelling-small hematoma   Supratherapeutic INR/coagulopathy- resolved   PMR on chr steroids    Plan  Low grade fevers, leucocytosis downtrending- monitor  Completed Vanco and Aztreonam 7d course  switched to Dapto to cont  bld cx ngtd, f/u rpt bcx ngtd  Ultrasound Rt thigh difficult study but no e/o collection  f/u plastics/surgery: wound care  f/u ortho- no interventions  Dose coumadin goal Inr 2-3  cards c/s f/u  f/u ID c/s

## 2018-04-16 NOTE — PROGRESS NOTE ADULT - SUBJECTIVE AND OBJECTIVE BOX
CHIEF COMPLAINT:    Interval Events:    REVIEW OF SYSTEMS:  Constitutional: No fevers or chills. No weight loss. No fatigue or generalized malaise.  Eyes: No itching or discharge from the eyes  ENT: No ear pain. No ear discharge. No nasal congestion. No post nasal drip. No epistaxis. No throat pain. No sore throat. No difficulty swallowing.   CV: No chest pain. No palpitations. No lightheadedness or dizziness.   Resp: No dyspnea at rest. No dyspnea on exertion. No orthopnea. No wheezing. No cough. No stridor. No sputum production. No chest pain with respiration.  GI: No nausea. No vomiting. No diarrhea.  MSK: No joint pain or pain in any extremities  Integumentary: No skin lesions. No pedal edema.  Neurological: No gross motor weakness. No sensory changes.  [ ] All other systems negative  [ ] Unable to assess ROS because ________    OBJECTIVE:  ICU Vital Signs Last 24 Hrs  T(C): 36.8 (16 Apr 2018 00:28), Max: 37.4 (15 Apr 2018 10:36)  T(F): 98.3 (16 Apr 2018 00:28), Max: 99.3 (15 Apr 2018 10:36)  HR: 81 (16 Apr 2018 00:28) (75 - 94)  BP: 132/77 (16 Apr 2018 00:28) (130/63 - 148/74)  BP(mean): --  ABP: --  ABP(mean): --  RR: 18 (16 Apr 2018 00:28) (18 - 18)  SpO2: 97% (16 Apr 2018 00:28) (95% - 100%)        04-14 @ 07:01  -  04-15 @ 07:00  --------------------------------------------------------  IN: 1146 mL / OUT: 1700 mL / NET: -554 mL    04-15 @ 07:01  -  04-16 @ 05:14  --------------------------------------------------------  IN: 660 mL / OUT: 850 mL / NET: -190 mL      CAPILLARY BLOOD GLUCOSE          PHYSICAL EXAM:  General: Awake, alert, oriented X 3.   HEENT: Atraumatic, normocephalic.                 Mallampatti Grade                 No nasal congestion.                No tonsillar or pharyngeal exudates.  Lymph Nodes: No palpable lymphadenopathy  Neck: No JVD. No carotid bruit.   Respiratory: Normal chest expansion                         Normal percussion                         Normal and equal air entry                         No wheeze, rhonchi or rales.  Cardiovascular: S1 S2 normal. No murmurs, rubs or gallops.   Abdomen: Soft, non-tender, non-distended. No organomegaly.  Extremities: Warm to touch. Peripheral pulse palpable. No pedal edema.   Skin: No rashes or skin lesions  Neurological: Motor and sensory examination equal and normal in all four extremities.  Psychiatry: Appropriate mood and affect.    HOSPITAL MEDICATIONS:  MEDICATIONS  (STANDING):  ascorbic acid 500 milliGRAM(s) Oral daily  aspirin enteric coated 81 milliGRAM(s) Oral daily  buDESOnide 160 MICROgram(s)/formoterol 4.5 MICROgram(s) Inhaler 2 Puff(s) Inhalation two times a day  calcium carbonate 1250 mG + Vitamin D (OsCal 500 + D) 1 Tablet(s) Oral three times a day  clotrimazole Lozenge 1 Lozenge Oral five times a day  DAPTOmycin IVPB 800 milliGRAM(s) IV Intermittent every 24 hours  docusate sodium 100 milliGRAM(s) Oral three times a day  ferrous    sulfate 325 milliGRAM(s) Oral daily  folic acid 1 milliGRAM(s) Oral daily  multivitamin 1 Tablet(s) Oral daily  pantoprazole    Tablet 40 milliGRAM(s) Oral before breakfast  polyethylene glycol 3350 17 Gram(s) Oral daily  povidone iodine 10% Swab 1 Application(s) Topical daily  silver sulfADIAZINE 1% Cream 1 Application(s) Topical daily  tiotropium 18 MICROgram(s) Capsule 1 Capsule(s) Inhalation daily    MEDICATIONS  (PRN):  acetaminophen   Tablet 650 milliGRAM(s) Oral every 6 hours PRN For Temp greater than 38 C (100.4 F)  acetaminophen   Tablet. 650 milliGRAM(s) Oral every 6 hours PRN Moderate Pain (4 - 6)  lidocaine 2% Viscous 10 milliLiter(s) Swish and Spit every 6 hours PRN Mouth Sores  oxyCODONE    IR 5 milliGRAM(s) Oral every 4 hours PRN Severe Pain (7 - 10)  senna 2 Tablet(s) Oral at bedtime PRN Constipation      LABS:                        7.7    18.29 )-----------( 357      ( 15 Apr 2018 09:15 )             24.1     04-14    132<L>  |  95<L>  |  17  ----------------------------<  107<H>  3.4<L>   |  25  |  0.70    Ca    8.1<L>      14 Apr 2018 05:21      PT/INR - ( 15 Apr 2018 10:12 )   PT: 24.8 sec;   INR: 2.16 ratio         PTT - ( 15 Apr 2018 08:26 )  PTT:39.8 sec          MICROBIOLOGY:     RADIOLOGY:  [ ] Reviewed and interpreted by me    Point of Care Ultrasound Findings:    PFT:    EKG: CHIEF COMPLAINT: better spirits-more alert, no cough or sob    Interval Events: ID f/up    REVIEW OF SYSTEMS:  Constitutional: No fevers or chills. No weight loss. + fatigue or generalized malaise.  Eyes: No itching or discharge from the eyes  ENT: No ear pain. No ear discharge. No nasal congestion. No post nasal drip. No epistaxis. No throat pain. No sore throat. No difficulty swallowing.   CV: No chest pain. No palpitations. No lightheadedness or dizziness.   Resp: No dyspnea at rest. + dyspnea on exertion. No orthopnea. No wheezing. No cough. No stridor. No sputum production. No chest pain with respiration.  GI: No nausea. No vomiting. No diarrhea.  MSK: No joint pain or pain in any extremities  Integumentary: No skin lesions. + pedal edema.  Neurological: No gross motor weakness- except legs No sensory changes.  [+ ] All other systems negative  [ ] Unable to assess ROS because ________    OBJECTIVE:  ICU Vital Signs Last 24 Hrs  T(C): 36.8 (16 Apr 2018 00:28), Max: 37.4 (15 Apr 2018 10:36)  T(F): 98.3 (16 Apr 2018 00:28), Max: 99.3 (15 Apr 2018 10:36)  HR: 81 (16 Apr 2018 00:28) (75 - 94)  BP: 132/77 (16 Apr 2018 00:28) (130/63 - 148/74)  BP(mean): --  ABP: --  ABP(mean): --  RR: 18 (16 Apr 2018 00:28) (18 - 18)  SpO2: 97% (16 Apr 2018 00:28) (95% - 100%)        04-14 @ 07:01  -  04-15 @ 07:00  --------------------------------------------------------  IN: 1146 mL / OUT: 1700 mL / NET: -554 mL    04-15 @ 07:01 - 04-16 @ 05:14  --------------------------------------------------------  IN: 660 mL / OUT: 850 mL / NET: -190 mL      CAPILLARY BLOOD GLUCOSE          PHYSICAL EXAM: NAD in bed  General: Awake, alert, oriented X 3.   HEENT: Atraumatic, normocephalic.                 Mallampatti Grade 3                No nasal congestion.                No tonsillar or pharyngeal exudates.  Lymph Nodes: No palpable lymphadenopathy  Neck: No JVD. No carotid bruit.   Respiratory: abnormal chest expansion-reduced                         Normal percussion                         Normal and equal air entry                         No wheeze, rhonchi or rales.  Cardiovascular: S1 S2 normal. No murmurs, rubs or gallops.   Abdomen: Soft, non-tender, non-distended. No organomegaly.  Extremities: Warm to touch. Peripheral pulse palpable. + pedal edema.   Skin: No rashes or skin lesions  Neurological: Motor and sensory examination equal and normal in all four extremities.  Psychiatry: Appropriate mood and affect.    HOSPITAL MEDICATIONS:  MEDICATIONS  (STANDING):  ascorbic acid 500 milliGRAM(s) Oral daily  aspirin enteric coated 81 milliGRAM(s) Oral daily  buDESOnide 160 MICROgram(s)/formoterol 4.5 MICROgram(s) Inhaler 2 Puff(s) Inhalation two times a day  calcium carbonate 1250 mG + Vitamin D (OsCal 500 + D) 1 Tablet(s) Oral three times a day  clotrimazole Lozenge 1 Lozenge Oral five times a day  DAPTOmycin IVPB 800 milliGRAM(s) IV Intermittent every 24 hours  docusate sodium 100 milliGRAM(s) Oral three times a day  ferrous    sulfate 325 milliGRAM(s) Oral daily  folic acid 1 milliGRAM(s) Oral daily  multivitamin 1 Tablet(s) Oral daily  pantoprazole    Tablet 40 milliGRAM(s) Oral before breakfast  polyethylene glycol 3350 17 Gram(s) Oral daily  povidone iodine 10% Swab 1 Application(s) Topical daily  silver sulfADIAZINE 1% Cream 1 Application(s) Topical daily  tiotropium 18 MICROgram(s) Capsule 1 Capsule(s) Inhalation daily    MEDICATIONS  (PRN):  acetaminophen   Tablet 650 milliGRAM(s) Oral every 6 hours PRN For Temp greater than 38 C (100.4 F)  acetaminophen   Tablet. 650 milliGRAM(s) Oral every 6 hours PRN Moderate Pain (4 - 6)  lidocaine 2% Viscous 10 milliLiter(s) Swish and Spit every 6 hours PRN Mouth Sores  oxyCODONE    IR 5 milliGRAM(s) Oral every 4 hours PRN Severe Pain (7 - 10)  senna 2 Tablet(s) Oral at bedtime PRN Constipation      LABS:                        7.7    18.29 )-----------( 357      ( 15 Apr 2018 09:15 )             24.1     04-14    132<L>  |  95<L>  |  17  ----------------------------<  107<H>  3.4<L>   |  25  |  0.70    Ca    8.1<L>      14 Apr 2018 05:21      PT/INR - ( 15 Apr 2018 10:12 )   PT: 24.8 sec;   INR: 2.16 ratio         PTT - ( 15 Apr 2018 08:26 )  PTT:39.8 sec          MICROBIOLOGY:     RADIOLOGY:  [ ] Reviewed and interpreted by me    Point of Care Ultrasound Findings:    PFT:    EKG:

## 2018-04-16 NOTE — SWALLOW VFSS/MBS ASSESSMENT ADULT - DIAGNOSTIC IMPRESSIONS
Pt scheduled for MBS this AM. Received call from JAS Layne, that patient is refusing to participate in exam. Per RN, pt c/o fatigue and is reportedly agitated. Case further d/w NP Joya (#[id number]). As per NP, patient to be tentatively re-scheduled for exam on 4/17/18 in the afternoon. NP to discuss plan with , HHA and patient. This service with f/u with patient, family and NP in AM on 4/17/18 and complete mbs in PM pending patient cooperation.

## 2018-04-16 NOTE — PROGRESS NOTE ADULT - PROBLEM SELECTOR PLAN 4
multifactorial-leg and lung--on ABX as per ID  need to reconsider other sources of infection-?endocarditis multifactorial-leg and lung--on ABX as per ID (daptomycin)  need to reconsider other sources of infection-?endocarditis

## 2018-04-16 NOTE — PROGRESS NOTE ADULT - ASSESSMENT
Plan  - continue silvadene to proximal thigh  - betadine to eschar  - keep wound clean & dry  - brace per ortho

## 2018-04-16 NOTE — PROGRESS NOTE ADULT - SUBJECTIVE AND OBJECTIVE BOX
CC: f/u for pneumonia and R knee MRSA septic arthritis    Patient remains alert, denies SOB, still weak in general; in bed all day    REVIEW OF SYSTEMS:  All other review of systems negative (Comprehensive ROS)    Antimicrobials Day # 23 total  Other Medications Reviewed    Vital Signs Last 24 Hrs  T(F): 99.6 (16 Apr 2018 10:47), Max: 99.6 (16 Apr 2018 10:47)  HR: 91 (16 Apr 2018 10:47) (75 - 91)  BP: 165/71 (16 Apr 2018 10:47) (132/73 - 165/71)  RR: 18 (16 Apr 2018 10:47) (18 - 18)  SpO2: 97% (16 Apr 2018 10:47) (95% - 97%)    PHYSICAL EXAM:  General: alert, no acute distress  Eyes:  anicteric, no conjunctival injection, no discharge  Oropharynx: no lesions or injection 	  Neck: supple, without adenopathy  Lungs:  clear anteriorly  Heart: regular rate and rhythm; no murmur, rubs or gallops  Abdomen: soft, nondistended, nontender, without mass or organomegaly  [place]te  Skin: no lesions  Extremities: dependent LE edema, L medial thigh edema/hematoma resolved; still with ecchymosis, shallow blister R upper thigh, R knee incision intact and dry, large medial eschar, black necrosis.  R PICC site clean  Neurologic: alert, moves all extremities    LAB RESULTS:                        7.2    16.16 )-----------( 349      ( 16 Apr 2018 07:36 )             22.4   04-16    135  |  99  |  12  ----------------------------<  93  3.7   |  24  |  0.58    Ca    8.3<L>      16 Apr 2018 06:20        MICROBIOLOGY:  RECENT CULTURES:  Culture - Blood (04.13.18 @ 11:46)    Specimen Source: .Blood Blood-Peripheral    Culture Results:   No growth to date.    04-08 @ 21:46 .Blood Blood-Peripheral     No growth to date.    04-08 @ 21:44 .Urine Catheterized     <10,000 CFU/ml  Normal Urogenital roopa present      RADIOLOGY REVIEWED:  CT Abdomen and Pelvis No Cont (04.09.18 @ 22:31) >  Multifocal pneumonia.  No acute intra-abdominal pathology.  No evidence of retroperitoneal hematoma. Small subcutaneous hematoma   within the left lower anterior abdominal wall.      US Joint Nonvasc Extremity Limited, Right (04.12.18 @ 12:28) >  Limited examination secondary to patient body habitus. No discrete   collection within the right thigh.

## 2018-04-16 NOTE — PROGRESS NOTE ADULT - SUBJECTIVE AND OBJECTIVE BOX
S: No acute events overnight. Pain controlled.    O:   Vital Signs Last 24 Hrs  T(C): 36.4 (04-16-18 @ 05:43), Max: 37.4 (04-15-18 @ 10:36)  T(F): 97.6 (04-16-18 @ 05:43), Max: 99.3 (04-15-18 @ 10:36)  HR: 89 (04-16-18 @ 05:43) (75 - 94)  BP: 141/75 (04-16-18 @ 05:43) (130/63 - 141/75)  BP(mean): --  RR: 18 (04-16-18 @ 05:43) (18 - 18)  SpO2: 95% (04-16-18 @ 05:43) (95% - 97%)    Physical Exam  RLE  dressing removed and replaced by Plastics this AM  thigh: blister medial thigh now popped  surgical wound intact, eschar has small purulent discharge  no surrounding erythema around wound, minimal swelling  5/5 ta/ehl/gcs  SILT L2-S1  2+ dp pulse                          7.7    18.29 )-----------( 357      ( 15 Apr 2018 09:15 )             24.1           PT/INR - ( 15 Apr 2018 10:12 )   PT: 24.8 sec;   INR: 2.16 ratio         PTT - ( 15 Apr 2018 08:26 )  PTT:39.8 sec    A/P: 77 year old female s/p R knee stage 1 revision  - Psych for depression counseling  - may require repeat I&D, to discuss with plastics  - 50% PWB in brace, no knee flexion allowed, may remove brace while in bed, brace to stay at least until outpatient follow-up  - PT/OT, oob to chair  - Abx per ID  - Wound care per plastics team  - DVT ppx per medicine/cards	  - Med.Cards/Pulm/ID care appreciated  Will continue to follow

## 2018-04-16 NOTE — PROGRESS NOTE ADULT - ATTENDING COMMENTS
I agree with the above note and have personally seen and examined this patient. All pertinent films have been reviewed. Please refer to clinical documentation of the history, physical examinations, data summary, and both assessment and plan as documented above and with which I agree.    pain controlled, awake and alert  now afebrile x 72 hours    RLE  thigh swelling continues to improve and soften, no erythema  surgical wound intact with stable eschar but medial aspect of eschar has small area of expressible purulent discharge  no surrounding erythema around wound, minimal swelling  wound dressed with xeroform, gauze, ace  5/5 ta/ehl/gcs  silt l4-s1  2+ dp pulse    no fevers >101F for more than 72 hours now, wbc pending from today  concerned about new purulent drainage from surgical wound eschar, will discuss with plastic surgery team, may require repeat I&D however patient  further work up and discussion needed to consider options of continued IV antibiotic treatment versus repeat superficial versus deep I&D as spacer is eluding antibiotics into the joint to treat local infection which the bacteria is sensitive to and also patient on IV abx per ID team, if return to OR will likely require flap from plastics team  **please involve consulting psych for depression counseling, need positive encouragement for patient to keep her with positive mental status and encourage her to spend majority of bed oob in bedside chair as able  remains medically ill, need for continued hospitalization and monitoring  inr resolved and now therapeutic range on coumadin  agree with ERIKA to check for vegetations  continue to optimize nutrition  may need repeat CT RLE to eval for fluid collection given limited utility of u/s exam, however no areas of fluctuance palpable around knee joint area    I spoke with the patient's  via telephone today and updated him with the above information. I informed him that I have requested consultations by Dr. Bangura, [facility] arthroplasty surgeon for his recommendations given the patients difficult postop course in clearing the infection.    Nate Bass MD  Attending Orthopedic Surgeon I agree with the above note and have personally seen and examined this patient. All pertinent films have been reviewed. Please refer to clinical documentation of the history, physical examinations, data summary, and both assessment and plan as documented above and with which I agree.    pain controlled, awake and alert  now afebrile x 72 hours    RLE  thigh swelling continues to improve and soften, no erythema  surgical wound intact with stable eschar but medial aspect of eschar has small area of minimal expressible purulent discharge  no surrounding erythema around wound, minimal swelling  wound dressed by plastics team  5/5 ta/ehl/gcs  silt l4-s1  2+ dp pulse    no fevers >101F for more than 72 hours now, wbc continues to downtrend today  medial eschar purulent drainage felt to represent fat necrosis per plastics team. not felt to represent new infection.   no plans for further surgery intervention at this point in time, antibiotic spacer is eluding medication directly into knee joint space  continue IV antibiotic treatment per infectious disease   **please involve consulting psych for depression counseling, need positive encouragement for patient to keep her with positive mental status and encourage her to spend majority of bed oob in bedside chair as able  remains medically ill, need for continued hospitalization and monitoring  inr resolved and now therapeutic range on coumadin  agree with ERIKA to check for vegetations  continue to optimize nutrition    I spoke with the patient's  via telephone today and updated him with the above information. I informed him that I have requested consultations by Dr. Bangura, [facility] arthroplasty surgeon for his recommendations given the patients difficult postop course in clearing the infection.    Nate Bass MD  Attending Orthopedic Surgeon

## 2018-04-16 NOTE — PROGRESS NOTE ADULT - ASSESSMENT
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    She returned to rehab on IV Dapto, but sent back with fever, leukocytosis and found to have multifocal pneumonia.   Completed 7 days vanco and aztreonam- Cxs all negative  Dependent edema and thigh hematoma, no discreet collection on Sono, but might question hematoma as link to prior low grade fever and reactive leukocytosis- H/H slowly dropping  Remains more alert, afebrile past 48 hrs, leukocytosis less; doubt new infection    Plan:  Continue Dapto alone directed at MRSA knee infex  Follow temps and CBC/diff   D/w pt, aide, and Dr Bangura

## 2018-04-16 NOTE — DIETITIAN INITIAL EVALUATION ADULT. - OTHER INFO
Patient seen for length of stay. Pt readmitted from [facility] with knee joint infection. Pt currently receivng puree diet, refusing MBS but now has agreed for tomorrow. Pt is obese, per HHA patient is picky eater, likes "a lot of cheese and chicken, no fish". Pt receiving Nepro since [facility] due to episode of hyperkalemia.  Patient not eating well now due to change in affect due to her concern over her rehospitalization for infected knee  per RN.

## 2018-04-16 NOTE — PROGRESS NOTE ADULT - SUBJECTIVE AND OBJECTIVE BOX
Plastic Surgery Progress Note    S: Patient seen and examined. was OOB to chair over weekend per pt.    Vital Signs Last 24 Hrs  T(C): 36.4 (16 Apr 2018 05:43), Max: 37.4 (15 Apr 2018 10:36)  T(F): 97.6 (16 Apr 2018 05:43), Max: 99.3 (15 Apr 2018 10:36)  HR: 89 (16 Apr 2018 05:43) (75 - 94)  BP: 141/75 (16 Apr 2018 05:43) (130/63 - 141/75)  BP(mean): --  RR: 18 (16 Apr 2018 05:43) (18 - 18)  SpO2: 95% (16 Apr 2018 05:43) (95% - 97%)  I&O's Detail    14 Apr 2018 07:01  -  15 Apr 2018 07:00  --------------------------------------------------------  IN:    heparin  Infusion.: 126 mL    IV PiggyBack: 300 mL    Oral Fluid: 720 mL  Total IN: 1146 mL    OUT:    Indwelling Catheter - Urethral: 1700 mL  Total OUT: 1700 mL    Total NET: -554 mL      15 Apr 2018 07:01  -  16 Apr 2018 06:41  --------------------------------------------------------  IN:    IV PiggyBack: 100 mL    Oral Fluid: 720 mL  Total IN: 820 mL    OUT:    Indwelling Catheter - Urethral: 1300 mL  Total OUT: 1300 mL    Total NET: -480 mL          Physical Exam:  General: Awake and alert, NAD  RLE: proximal thigh blister evolving, now approx 3x4cm ulcer, similar smaller lesion on R lower panus approx 1x1cm.  eschar over knee stable, incision CDI

## 2018-04-16 NOTE — PROGRESS NOTE ADULT - SUBJECTIVE AND OBJECTIVE BOX
Patient is a 77y old  Female who presents with a chief complaint of fever (11 Apr 2018 14:25)  pt seen and examined at bedside   SUBJECTIVE/OVERNIGHT events none, feels same, rt thigh pain persists    Vital Signs Last 24 Hrs  T(C): 37.6 (16 Apr 2018 10:47), Max: 37.6 (16 Apr 2018 10:47)  T(F): 99.6 (16 Apr 2018 10:47), Max: 99.6 (16 Apr 2018 10:47)  HR: 91 (16 Apr 2018 10:47) (75 - 91)  BP: 165/71 (16 Apr 2018 10:47) (132/73 - 165/71)  BP(mean): --  RR: 18 (16 Apr 2018 10:47) (18 - 18)  SpO2: 97% (16 Apr 2018 10:47) (95% - 97%)  CAPILLARY BLOOD GLUCOSE        MEDICATIONS  (STANDING):  ascorbic acid 500 milliGRAM(s) Oral daily  aspirin enteric coated 81 milliGRAM(s) Oral daily  buDESOnide 160 MICROgram(s)/formoterol 4.5 MICROgram(s) Inhaler 2 Puff(s) Inhalation two times a day  calcium carbonate 1250 mG + Vitamin D (OsCal 500 + D) 1 Tablet(s) Oral three times a day  clotrimazole Lozenge 1 Lozenge Oral five times a day  DAPTOmycin IVPB 800 milliGRAM(s) IV Intermittent every 24 hours  docusate sodium 100 milliGRAM(s) Oral three times a day  ferrous    sulfate 325 milliGRAM(s) Oral daily  folic acid 1 milliGRAM(s) Oral daily  multivitamin 1 Tablet(s) Oral daily  pantoprazole    Tablet 40 milliGRAM(s) Oral before breakfast  polyethylene glycol 3350 17 Gram(s) Oral daily  povidone iodine 10% Solution 1 Application(s) Topical three times a day  silver sulfADIAZINE 1% Cream 1 Application(s) Topical daily  tiotropium 18 MICROgram(s) Capsule 1 Capsule(s) Inhalation daily  warfarin 1 milliGRAM(s) Oral once    MEDICATIONS  (PRN):  acetaminophen   Tablet 650 milliGRAM(s) Oral every 6 hours PRN For Temp greater than 38 C (100.4 F)  acetaminophen   Tablet. 650 milliGRAM(s) Oral every 6 hours PRN Moderate Pain (4 - 6)  lidocaine 2% Viscous 10 milliLiter(s) Swish and Spit every 6 hours PRN Mouth Sores  oxyCODONE    IR 5 milliGRAM(s) Oral every 4 hours PRN Severe Pain (7 - 10)  senna 2 Tablet(s) Oral at bedtime PRN Constipation    PHYSICAL EXAM  General : comfortable, not in any acute distress  Neck : supple, no LAD, no JVD  Eyes : EOMI, PERRLA, conjunctiva : no icterus, no pallor  MM moist, no pharyngeal erythema/exudates  Pulmonary: clear to auscultation ant,   CVS : S1 S2,rate normal-,rhythm-regular, no murmurs, no abnormal sounds  Gastrointestinal: soft, non tender, non distended, no organomegaly , bowel sounds audible  Extremities: rt knee dressing-eschar+  rt prox thigh dressing, tender  Neuro: AAOx3,     LABS                        7.2    16.16 )-----------( 349      ( 16 Apr 2018 07:36 )             22.4     04-16    135  |  99  |  12  ----------------------------<  93  3.7   |  24  |  0.58    Ca    8.3<L>      16 Apr 2018 06:20        Culture - Blood (collected 13 Apr 2018 11:46)  Source: .Blood Blood-Peripheral  Preliminary Report (14 Apr 2018 12:01):    No growth to date.    Culture - Blood (collected 13 Apr 2018 11:46)  Source: .Blood Blood-Peripheral  Preliminary Report (14 Apr 2018 12:01):    No growth to date.      RADIOLOGY and IMAGING reviewed: yes  Consultant notes reviewed : yes  Care discussed with Consultants/other providers :

## 2018-04-16 NOTE — PROGRESS NOTE ADULT - ASSESSMENT
Patient with infected right tkr s/p rudy, spacer for strept infection, had poor wound healing so returned 3 months after and had spacer exchange and complex wound closure with mrsa infection found. She is about 2 weeks into dapto and returns with fever, leukocytosis, ongoing pain and eschar of right knee wound and what appears to be a hematoma of the right thigh. INR has been up so that is good reason for the thigh findings. Must consider infected hematoma possible. UTI is possible. Pneumonia is possible given basilar rales. retroperitoneal hematoma possible .  PMR decompensation or adrenal insufficiency a consideration at well.    Pulmonary stable relative to prior admits   CT abnormal--this suggests aspiration and not active PNA w/paucity of clinical signs and sx (ABX should cover her for both infections)    Concern over persistent fevers and lethargy-likely represents an untreated source of infection Patient with infected right tkr s/p rudy, spacer for strept infection, had poor wound healing so returned 3 months after and had spacer exchange and complex wound closure with mrsa infection found. She is about 2 weeks into dapto and returns with fever, leukocytosis, ongoing pain and eschar of right knee wound and what appears to be a hematoma of the right thigh. INR has been up so that is good reason for the thigh findings. Must consider infected hematoma possible. UTI is possible. Pneumonia is possible given basilar rales. retroperitoneal hematoma possible .  PMR decompensation or adrenal insufficiency a consideration at well.    Pulmonary stable relative to prior admits   CT abnormal--this suggests aspiration and not active PNA w/paucity of clinical signs and sx (ABX should cover her for both infections)    Concern over persistent fevers and lethargy-likely represents an untreated source of infection--ID following closely

## 2018-04-16 NOTE — CHART NOTE - NSCHARTNOTEFT_GEN_A_CORE
Notified by RN of pt with Temp 101.9 (38.8). D/w ID MD, Dr Cannon.   Pt already on antibiotic Daptomycin. All cultures negative so far.   No additional intervention at this time per Dr. Cannon.   Pt to receive Tylenol. Continue to monitor.

## 2018-04-16 NOTE — DIETITIAN INITIAL EVALUATION ADULT. - NS AS NUTRI INTERV COLLABORAT
Collaboration with other providers/await MBS results with diet recommendation  from speech and swallow therapist

## 2018-04-16 NOTE — PROGRESS NOTE ADULT - ATTENDING COMMENTS
as above-  persistent fever and fatigue an issue--?unresolved source of temps (doubt lungs)  Pulm relatively stable-atelectasis, prior PE, asthma, cardiac Dz; abnormal CT-likely represents aspiration PNA  Inhaler regimen as outlined above; Sp and Sw evaluation--all meals in chair etc  DVT rx /prophylaxis  ID follow up of ABX (7 day overlap then Daptomycin alone at rehab)---? Endocarditis--ERIKA   PT evaluation and Psych evaluation    Everett Kumar MD-Pulmonary   [phone] as above-  persistent fever and fatigue an issue--?unresolved source of temps (doubt lungs)  Pulm relatively stable-atelectasis, prior PE, asthma, cardiac Dz; abnormal CT-likely represents aspiration PNA  Inhaler regimen as outlined above; *******Sp and Sw evaluation--all meals in chair etc  DVT rx /prophylaxis  ID follow up of ABX (completed 7 day overlap then Daptomycin alone at rehab)---? Endocarditis--ERIKA---ID follow up   PT evaluation and Psych evaluation    Everett Kumar MD-Pulmonary   [phone]

## 2018-04-17 DIAGNOSIS — F43.20 ADJUSTMENT DISORDER, UNSPECIFIED: ICD-10-CM

## 2018-04-17 DIAGNOSIS — F05 DELIRIUM DUE TO KNOWN PHYSIOLOGICAL CONDITION: ICD-10-CM

## 2018-04-17 LAB
ANION GAP SERPL CALC-SCNC: 9 MMOL/L — SIGNIFICANT CHANGE UP (ref 5–17)
BASOPHILS # BLD AUTO: 0.06 K/UL — SIGNIFICANT CHANGE UP (ref 0–0.2)
BASOPHILS NFR BLD AUTO: 0.4 % — SIGNIFICANT CHANGE UP (ref 0–2)
BUN SERPL-MCNC: 11 MG/DL — SIGNIFICANT CHANGE UP (ref 7–23)
CALCIUM SERPL-MCNC: 8.6 MG/DL — SIGNIFICANT CHANGE UP (ref 8.4–10.5)
CHLORIDE SERPL-SCNC: 98 MMOL/L — SIGNIFICANT CHANGE UP (ref 96–108)
CO2 SERPL-SCNC: 28 MMOL/L — SIGNIFICANT CHANGE UP (ref 22–31)
CREAT SERPL-MCNC: 0.65 MG/DL — SIGNIFICANT CHANGE UP (ref 0.5–1.3)
EOSINOPHIL # BLD AUTO: 0.26 K/UL — SIGNIFICANT CHANGE UP (ref 0–0.5)
EOSINOPHIL NFR BLD AUTO: 1.5 % — SIGNIFICANT CHANGE UP (ref 0–6)
GLUCOSE SERPL-MCNC: 102 MG/DL — HIGH (ref 70–99)
HCT VFR BLD CALC: 22.7 % — LOW (ref 34.5–45)
HGB BLD-MCNC: 7.2 G/DL — LOW (ref 11.5–15.5)
IMM GRANULOCYTES NFR BLD AUTO: 3.2 % — HIGH (ref 0–1.5)
INR BLD: 2.97 RATIO — HIGH (ref 0.88–1.16)
LYMPHOCYTES # BLD AUTO: 1.66 K/UL — SIGNIFICANT CHANGE UP (ref 1–3.3)
LYMPHOCYTES # BLD AUTO: 9.9 % — LOW (ref 13–44)
MCHC RBC-ENTMCNC: 27.2 PG — SIGNIFICANT CHANGE UP (ref 27–34)
MCHC RBC-ENTMCNC: 31.7 GM/DL — LOW (ref 32–36)
MCV RBC AUTO: 85.7 FL — SIGNIFICANT CHANGE UP (ref 80–100)
MONOCYTES # BLD AUTO: 0.94 K/UL — HIGH (ref 0–0.9)
MONOCYTES NFR BLD AUTO: 5.6 % — SIGNIFICANT CHANGE UP (ref 2–14)
NEUTROPHILS # BLD AUTO: 13.37 K/UL — HIGH (ref 1.8–7.4)
NEUTROPHILS NFR BLD AUTO: 79.4 % — HIGH (ref 43–77)
PLATELET # BLD AUTO: 401 K/UL — HIGH (ref 150–400)
POTASSIUM SERPL-MCNC: 3.4 MMOL/L — LOW (ref 3.5–5.3)
POTASSIUM SERPL-SCNC: 3.4 MMOL/L — LOW (ref 3.5–5.3)
PROTHROM AB SERPL-ACNC: 34.3 SEC — HIGH (ref 10–13.1)
RBC # BLD: 2.65 M/UL — LOW (ref 3.8–5.2)
RBC # FLD: 17.6 % — HIGH (ref 10.3–14.5)
SODIUM SERPL-SCNC: 135 MMOL/L — SIGNIFICANT CHANGE UP (ref 135–145)
WBC # BLD: 16.83 K/UL — HIGH (ref 3.8–10.5)
WBC # FLD AUTO: 16.83 K/UL — HIGH (ref 3.8–10.5)

## 2018-04-17 PROCEDURE — 74230 X-RAY XM SWLNG FUNCJ C+: CPT | Mod: 26

## 2018-04-17 PROCEDURE — 99222 1ST HOSP IP/OBS MODERATE 55: CPT

## 2018-04-17 PROCEDURE — 99232 SBSQ HOSP IP/OBS MODERATE 35: CPT

## 2018-04-17 RX ORDER — POTASSIUM CHLORIDE 20 MEQ
20 PACKET (EA) ORAL ONCE
Qty: 0 | Refills: 0 | Status: COMPLETED | OUTPATIENT
Start: 2018-04-17 | End: 2018-04-17

## 2018-04-17 RX ORDER — LANOLIN ALCOHOL/MO/W.PET/CERES
3 CREAM (GRAM) TOPICAL AT BEDTIME
Qty: 0 | Refills: 0 | Status: DISCONTINUED | OUTPATIENT
Start: 2018-04-17 | End: 2018-06-06

## 2018-04-17 RX ORDER — WARFARIN SODIUM 2.5 MG/1
0.5 TABLET ORAL ONCE
Qty: 0 | Refills: 0 | Status: COMPLETED | OUTPATIENT
Start: 2018-04-17 | End: 2018-04-17

## 2018-04-17 RX ORDER — HYDROMORPHONE HYDROCHLORIDE 2 MG/ML
0.5 INJECTION INTRAMUSCULAR; INTRAVENOUS; SUBCUTANEOUS EVERY 4 HOURS
Qty: 0 | Refills: 0 | Status: DISCONTINUED | OUTPATIENT
Start: 2018-04-17 | End: 2018-04-23

## 2018-04-17 RX ADMIN — Medication 500000 UNIT(S): at 12:42

## 2018-04-17 RX ADMIN — Medication 20 MILLIEQUIVALENT(S): at 13:37

## 2018-04-17 RX ADMIN — WARFARIN SODIUM 0.5 MILLIGRAM(S): 2.5 TABLET ORAL at 21:29

## 2018-04-17 RX ADMIN — Medication 1 TABLET(S): at 13:41

## 2018-04-17 RX ADMIN — HYDROMORPHONE HYDROCHLORIDE 0.5 MILLIGRAM(S): 2 INJECTION INTRAMUSCULAR; INTRAVENOUS; SUBCUTANEOUS at 18:48

## 2018-04-17 RX ADMIN — Medication 500000 UNIT(S): at 21:29

## 2018-04-17 RX ADMIN — BUDESONIDE AND FORMOTEROL FUMARATE DIHYDRATE 2 PUFF(S): 160; 4.5 AEROSOL RESPIRATORY (INHALATION) at 17:59

## 2018-04-17 RX ADMIN — Medication 100 MILLIGRAM(S): at 12:42

## 2018-04-17 RX ADMIN — Medication 1 TABLET(S): at 21:29

## 2018-04-17 RX ADMIN — TIOTROPIUM BROMIDE 1 CAPSULE(S): 18 CAPSULE ORAL; RESPIRATORY (INHALATION) at 12:42

## 2018-04-17 RX ADMIN — PANTOPRAZOLE SODIUM 40 MILLIGRAM(S): 20 TABLET, DELAYED RELEASE ORAL at 06:17

## 2018-04-17 RX ADMIN — Medication 1 TABLET(S): at 06:17

## 2018-04-17 RX ADMIN — HYDROMORPHONE HYDROCHLORIDE 0.5 MILLIGRAM(S): 2 INJECTION INTRAMUSCULAR; INTRAVENOUS; SUBCUTANEOUS at 12:43

## 2018-04-17 RX ADMIN — Medication 500000 UNIT(S): at 06:17

## 2018-04-17 RX ADMIN — Medication 1 APPLICATION(S): at 11:52

## 2018-04-17 RX ADMIN — Medication 1 APPLICATION(S): at 11:51

## 2018-04-17 RX ADMIN — Medication 100 MILLIGRAM(S): at 21:29

## 2018-04-17 RX ADMIN — HYDROMORPHONE HYDROCHLORIDE 0.5 MILLIGRAM(S): 2 INJECTION INTRAMUSCULAR; INTRAVENOUS; SUBCUTANEOUS at 13:56

## 2018-04-17 RX ADMIN — Medication 1 MILLIGRAM(S): at 12:42

## 2018-04-17 RX ADMIN — HYDROMORPHONE HYDROCHLORIDE 0.5 MILLIGRAM(S): 2 INJECTION INTRAMUSCULAR; INTRAVENOUS; SUBCUTANEOUS at 06:48

## 2018-04-17 RX ADMIN — Medication 1 APPLICATION(S): at 13:56

## 2018-04-17 RX ADMIN — DAPTOMYCIN 132 MILLIGRAM(S): 500 INJECTION, POWDER, LYOPHILIZED, FOR SOLUTION INTRAVENOUS at 06:17

## 2018-04-17 RX ADMIN — Medication 325 MILLIGRAM(S): at 12:43

## 2018-04-17 RX ADMIN — BUDESONIDE AND FORMOTEROL FUMARATE DIHYDRATE 2 PUFF(S): 160; 4.5 AEROSOL RESPIRATORY (INHALATION) at 06:17

## 2018-04-17 RX ADMIN — Medication 500 MILLIGRAM(S): at 13:41

## 2018-04-17 RX ADMIN — Medication 1 APPLICATION(S): at 21:29

## 2018-04-17 RX ADMIN — HYDROMORPHONE HYDROCHLORIDE 0.5 MILLIGRAM(S): 2 INJECTION INTRAMUSCULAR; INTRAVENOUS; SUBCUTANEOUS at 06:33

## 2018-04-17 RX ADMIN — Medication 1 TABLET(S): at 12:42

## 2018-04-17 RX ADMIN — Medication 3 MILLIGRAM(S): at 21:29

## 2018-04-17 RX ADMIN — Medication 81 MILLIGRAM(S): at 13:41

## 2018-04-17 RX ADMIN — Medication 100 MILLIGRAM(S): at 06:17

## 2018-04-17 NOTE — SWALLOW VFSS/MBS ASSESSMENT ADULT - ORAL PHASE
trace-mild oral residue/Residue in oral cavity/Reduced anterior - posterior transport within functional limits Delayed oral transit time/Reduced anterior - posterior transport/Residue in oral cavity/+mild oral residue

## 2018-04-17 NOTE — PROGRESS NOTE ADULT - ATTENDING COMMENTS
as above-  persistent fever and fatigue an issue--?unresolved source of temps (doubt lungs)  Pulm relatively stable-atelectasis, prior PE, asthma, cardiac Dz; abnormal CT-likely represents aspiration PNA  Inhaler regimen as outlined above; *******Sp and Sw evaluation--all meals in chair etc  DVT rx /prophylaxis  ID follow up of ABX (completed 7 day overlap then Daptomycin alone at rehab)---? Endocarditis--ERIKA---ID follow up   PT evaluation and Psych evaluation    Everett Kumar MD-Pulmonary   [phone]

## 2018-04-17 NOTE — BEHAVIORAL HEALTH ASSESSMENT NOTE - AXIS III
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

## 2018-04-17 NOTE — PROGRESS NOTE ADULT - SUBJECTIVE AND OBJECTIVE BOX
CC: f/u for pneumonia and R knee MRSA septic arthritis    Patient without new c/o    REVIEW OF SYSTEMS:  All other review of systems negative (Comprehensive ROS)    Antimicrobials Day # 24 total  DAPTOmycin IVPB 800 milliGRAM(s) IV Intermittent every 24 hours  nystatin    Suspension 602254 Unit(s) Oral every 6 hours      Other Medications Reviewed    Vital Signs Last 24 Hrs  T(C): 36.6 (17 Apr 2018 11:47), Max: 38.8 (16 Apr 2018 16:18)  T(F): 97.9 (17 Apr 2018 11:47), Max: 101.9 (16 Apr 2018 16:18)  HR: 90 (17 Apr 2018 11:47) (80 - 93)  BP: 143/76 (17 Apr 2018 11:47) (127/73 - 144/73)  BP(mean): --  RR: 18 (17 Apr 2018 11:47) (18 - 20)  SpO2: 97% (17 Apr 2018 11:47) (94% - 100%)    PHYSICAL EXAM:  General: alert, no acute distress  Eyes:  anicteric, no conjunctival injection, no discharge  Oropharynx: no lesions or injection 	  Neck: supple, without adenopathy  Lungs:  diminished at bases  Heart: regular rate and rhythm; no murmur, rubs or gallops  Abdomen: soft, nondistended, nontender, without mass or organomegaly  [place]  Skin: no lesions  Extremities: dependent LE edema, L medial thigh edema/hematoma resolved; still with ecchymosis, shallow blister R upper thigh, R knee incision intact and dry, large medial eschar, black necrosis.  R PICC site clean  Neurologic: alert, moves all extremities    LAB RESULTS:                                     7.2    16.83 )-----------( 401      ( 17 Apr 2018 07:37 )             22.7   04-17    135  |  98  |  11  ----------------------------<  102<H>  3.4<L>   |  28  |  0.65    Ca    8.6      17 Apr 2018 06:37            MICROBIOLOGY:  RECENT CULTURES REVIEWED    RADIOLOGY REVIEWED CC: f/u for pneumonia and R knee MRSA septic arthritis    Patient without new c/o, had some abd discomfort, no diarrhea    REVIEW OF SYSTEMS:  All other review of systems negative (Comprehensive ROS)    Antimicrobials Day # 24 total  DAPTOmycin IVPB 800 milliGRAM(s) IV Intermittent every 24 hours  nystatin    Suspension 970860 Unit(s) Oral every 6 hours      Other Medications Reviewed    Vital Signs Last 24 Hrs  T(C): 36.6 (17 Apr 2018 11:47), Max: 38.8 (16 Apr 2018 16:18)  T(F): 97.9 (17 Apr 2018 11:47), Max: 101.9 (16 Apr 2018 16:18)  HR: 90 (17 Apr 2018 11:47) (80 - 93)  BP: 143/76 (17 Apr 2018 11:47) (127/73 - 144/73)  BP(mean): --  RR: 18 (17 Apr 2018 11:47) (18 - 20)  SpO2: 97% (17 Apr 2018 11:47) (94% - 100%)    PHYSICAL EXAM:  General: alert, no acute distress  Eyes:  anicteric, no conjunctival injection, no discharge  Oropharynx: no lesions or injection 	  Neck: supple, without adenopathy  Lungs:  diminished at bases  Heart: regular rate and rhythm; no murmur, rubs or gallops  Abdomen: soft, nondistended, nontender, without mass or organomegaly  [place]  Skin: no lesions  Extremities: dependent LE edema, L medial thigh edema/hematoma resolved; still with ecchymosis, shallow blister R upper thigh, R knee incision intact and dry, large medial eschar, black necrosis.  R PICC site clean  Neurologic: alert, moves all extremities    LAB RESULTS:                                     7.2    16.83 )-----------( 401      ( 17 Apr 2018 07:37 )             22.7   04-17    135  |  98  |  11  ----------------------------<  102<H>  3.4<L>   |  28  |  0.65    Ca    8.6      17 Apr 2018 06:37            MICROBIOLOGY:  RECENT CULTURES REVIEWED    RADIOLOGY REVIEWED

## 2018-04-17 NOTE — SWALLOW VFSS/MBS ASSESSMENT ADULT - LARYNGEAL PENETRATION DURING THE SWALLOW - SILENT
over the arytenoids, shallow with nectar thickened liquids, deep with thin liquids (> with consecutive cup sips), with retrieval/Trace

## 2018-04-17 NOTE — PROGRESS NOTE ADULT - ATTENDING COMMENTS
I agree with the above note and have personally seen and examined this patient. All pertinent films have been reviewed. Please refer to clinical documentation of the history, physical examinations, data summary, and both assessment and plan as documented above and with which I agree.    pain controlled, awake and alert  febrile yesterday after becoming upset but this immediately defervesced when she was more calm    RLE  thigh swelling continues to improve and soften, no erythema  surgical wound intact with stable eschar but medial aspect of eschar has small area of minimal expressible purulent discharge  no surrounding erythema around wound, minimal swelling  wound dressed by plastics team  5/5 ta/ehl/gcs  silt l4-s1  2+ dp pulse    wbc continues to be elevated but stable  medial eschar purulent drainage felt to represent fat necrosis per plastics team. not felt to represent new infection.   no plans for further surgery intervention at this point in time, antibiotic spacer is eluding medication directly into knee joint space  continue IV antibiotic treatment per infectious disease   appreciate behavioral health notes  encourage her to spend majority of bed oob in bedside chair as able  remains medically ill, need for continued hospitalization and monitoring  inr resolved and now therapeutic range on coumadin  continue to optimize nutrition    As requested we sought 2 independent consultation opinions yesterday from Dr. Bangura and Dr. Olivares. Both spoke with the patient and Dr. Bangura had a prolonged >45 minute conversation with the patient's . They discussed goals given her difficult postop course and reduced likelihood for being able to convert spacer to TKA given her poor vascular supply to her RLE and difficulty in healing her surgical wound. Both Dr. Bangura and Dr. Olivares recommended consideration for RLE AKA to both prevent her from becoming sicker and to help her to start making progress for her recovery as she is having a difficult time tolerating a static spacer. I also spoke with the patient's  today about this recommendation and he appreciated the independent consultations but at this time wishes to continue with current treatment. We discussed that if she starts to decompensate or does not start making more progress over the next several day sthen we will revisit this option. He understands why this recommendation and will discuss it with his family.    Nate Bass MD  Attending Orthopedic Surgeon . I agree with the above note and have personally seen and examined this patient. All pertinent films have been reviewed. Please refer to clinical documentation of the history, physical examinations, data summary, and both assessment and plan as documented above and with which I agree.    pain controlled, awake and alert  febrile yesterday after becoming upset but this immediately defervesced when she was more calm    RLE  thigh swelling continues to improve and soften, no erythema, eschar stable, now with fat necrosis exudate  surgical wound intact with stable eschar but medial aspect of eschar has small area of minimal expressible purulent discharge  no surrounding erythema around wound, minimal swelling  wound dressed by plastics team  5/5 ta/ehl/gcs  silt l4-s1  2+ dp pulse    recommend new CT scan pelvis through RLE to eval for fluid collection, now fat necrosis with exudate about area of thigh swelling, local wound care  wbc continues to be elevated but stable  medial eschar purulent drainage felt to represent fat necrosis per plastics team. not felt to represent new infection.   no plans for further surgery intervention at this point in time, antibiotic spacer is eluding medication directly into knee joint space  continue IV antibiotic treatment per infectious disease   appreciate behavioral health notes  encourage her to spend majority of bed oob in bedside chair as able  remains medically ill, need for continued hospitalization and monitoring  inr resolved and now therapeutic range on coumadin  continue to optimize nutrition    As requested we sought 2 independent consultation opinions yesterday from Dr. Bangura and Dr. Olivares. Both spoke with the patient and Dr. Bangura had a prolonged >45 minute conversation with the patient's . They discussed goals given her difficult postop course and reduced likelihood for being able to convert spacer to TKA given her poor vascular supply to her RLE and difficulty in healing her surgical wound. Both Dr. Bangura and Dr. Olivares recommended consideration for RLE AKA to both prevent her from becoming sicker and to help her to start making progress for her recovery as she is having a difficult time tolerating a static spacer. I also spoke with the patient's  today about this recommendation and he appreciated the independent consultations but at this time wishes to continue with current treatment. We discussed that if she starts to decompensate or does not start making more progress over the next several day sthen we will revisit this option. He understands why this recommendation and will discuss it with his family.    Nate Bass MD  Attending Orthopedic Surgeon .

## 2018-04-17 NOTE — PROGRESS NOTE ADULT - SUBJECTIVE AND OBJECTIVE BOX
Patient is a 77y old  Female who presents with a chief complaint of fever (11 Apr 2018 14:25)  pt seen and examined at bedside   SUBJECTIVE/OVERNIGHT events noted, febrile to 101.9  this am oob to chair, feels well, non toxic  no new complaints    Vital Signs Last 24 Hrs  T(C): 36.6 (17 Apr 2018 11:47), Max: 38.8 (16 Apr 2018 16:18)  T(F): 97.9 (17 Apr 2018 11:47), Max: 101.9 (16 Apr 2018 16:18)  HR: 90 (17 Apr 2018 11:47) (80 - 93)  BP: 143/76 (17 Apr 2018 11:47) (127/73 - 144/73)  BP(mean): --  RR: 18 (17 Apr 2018 11:47) (18 - 20)  SpO2: 97% (17 Apr 2018 11:47) (94% - 100%)  CAPILLARY BLOOD GLUCOSE        MEDICATIONS  (STANDING):  ascorbic acid 500 milliGRAM(s) Oral daily  aspirin enteric coated 81 milliGRAM(s) Oral daily  buDESOnide 160 MICROgram(s)/formoterol 4.5 MICROgram(s) Inhaler 2 Puff(s) Inhalation two times a day  calcium carbonate 1250 mG + Vitamin D (OsCal 500 + D) 1 Tablet(s) Oral three times a day  DAPTOmycin IVPB 800 milliGRAM(s) IV Intermittent every 24 hours  docusate sodium 100 milliGRAM(s) Oral three times a day  ferrous    sulfate 325 milliGRAM(s) Oral daily  folic acid 1 milliGRAM(s) Oral daily  melatonin 3 milliGRAM(s) Oral at bedtime  multivitamin 1 Tablet(s) Oral daily  nystatin    Suspension 142051 Unit(s) Oral every 6 hours  pantoprazole    Tablet 40 milliGRAM(s) Oral before breakfast  polyethylene glycol 3350 17 Gram(s) Oral daily  povidone iodine 10% Solution 1 Application(s) Topical three times a day  silver sulfADIAZINE 1% Cream 1 Application(s) Topical daily  tiotropium 18 MICROgram(s) Capsule 1 Capsule(s) Inhalation daily  warfarin 0.5 milliGRAM(s) Oral once    MEDICATIONS  (PRN):  acetaminophen   Tablet 650 milliGRAM(s) Oral every 6 hours PRN For Temp greater than 38 C (100.4 F)  acetaminophen   Tablet. 650 milliGRAM(s) Oral every 6 hours PRN Mild Pain (1 - 3)  HYDROmorphone  Injectable 0.5 milliGRAM(s) IV Push every 6 hours PRN Severe Pain (7 - 10)  lidocaine 2% Viscous 10 milliLiter(s) Swish and Spit every 6 hours PRN Mouth Sores  oxyCODONE    IR 5 milliGRAM(s) Oral every 4 hours PRN Moderate Pain (4 - 6)  senna 2 Tablet(s) Oral at bedtime PRN Constipation    PHYSICAL EXAM  General : comfortable, not in any acute distress  Neck : supple, no LAD, no JVD  Eyes : EOMI, PERRLA, conjunctiva : no icterus, no pallor  MM moist, no pharyngeal erythema/exudates  Pulmonary: clear to auscultation bilateral lung fields, no crackles/rhonchi/wheezing,   CVS : S1 S2,rate normal-,rhythm-regular, no murmurs, no abnormal sounds  Gastrointestinal: soft, non tender, non distended, no organomegaly , bowel sounds audible  Extremities: rt knee and prox thigh dressing, swelling persists  Neuro: AAOx3, no focal deficits  Musculoskeletal:  FROM of all ext  Skin: no rash  LABS                        7.2    16.83 )-----------( 401      ( 17 Apr 2018 07:37 )             22.7     04-17    135  |  98  |  11  ----------------------------<  102<H>  3.4<L>   |  28  |  0.65    Ca    8.6      17 Apr 2018 06:37        RADIOLOGY and IMAGING reviewed: yes  Consultant notes reviewed : yes  Care discussed with Consultants/other providers :

## 2018-04-17 NOTE — BEHAVIORAL HEALTH ASSESSMENT NOTE - HPI (INCLUDE ILLNESS QUALITY, SEVERITY, DURATION, TIMING, CONTEXT, MODIFYING FACTORS, ASSOCIATED SIGNS AND SYMPTOMS)
77 y.o. woman with PMHx of Total Knee Replacement with recent joint infection (s/p explant and abx spacer on 12/23/17, + rehab when had RLE DVT now on Coumadin s/p 3/23/2018 Right knee explantation of previously placed articulating antibiotic spacer, irrigation and debridement of the knee, placement of static antibiotic spacer, with a Plastic Surgery soft tissue complex wound closure), CAD s/p HERBERT, severe AS (s/p TAVR & PPM 12/17 [employer] Model [id number]), h/o MVR, PMR on chronic steroids, asthma, OA, HLD, GERD, Anxiety, Anemia, presented with sepsis (fever, leukocytosis and presumed source of infection).  Pt. was evaluated by ID who advised to complete 6 weeks of iv daptomycin (end 5/5) and then prolonged po keflex and minocycline, local wound care per plastics monitor CMP, CBC with differential and CPK weekly.  On evaluation, patient is slightly drowsy, detached, irritable, and frustrated with her care. She states her mood is 'bad', overall unable to maintain a conversation as she dozes off and has difficulty focusing on the questions.

## 2018-04-17 NOTE — PROGRESS NOTE ADULT - ASSESSMENT
Patient with infected right tkr s/p rudy, spacer for strept infection, had poor wound healing so returned 3 months after and had spacer exchange and complex wound closure with mrsa infection found. She is about 2 weeks into dapto and returns with fever, leukocytosis, ongoing pain and eschar of right knee wound and what appears to be a hematoma of the right thigh. INR has been up so that is good reason for the thigh findings. Must consider infected hematoma possible. UTI is possible. Pneumonia is possible given basilar rales. retroperitoneal hematoma possible .  PMR decompensation or adrenal insufficiency a consideration at well.    Pulmonary stable relative to prior admits   CT abnormal--this suggests aspiration and not active PNA w/paucity of clinical signs and sx (ABX should cover her for both infections)    Concern over persistent fevers and lethargy-likely represents an untreated source of infection--ID following closely Patient with infected right tkr s/p rudy, spacer for strept infection, had poor wound healing so returned 3 months after and had spacer exchange and complex wound closure with mrsa infection found. She is about 2 weeks into dapto and returns with fever, leukocytosis, ongoing pain and eschar of right knee wound and what appears to be a hematoma of the right thigh. INR has been up so that is good reason for the thigh findings. Must consider infected hematoma possible. UTI is possible. Pneumonia is possible given basilar rales. retroperitoneal hematoma possible .  PMR decompensation or adrenal insufficiency a consideration at well.    Pulmonary stable relative to prior admits   CT abnormal--this suggests aspiration and not active PNA w/paucity of clinical signs and sx (ABX should cover her for both infections)    Concern over persistent fevers and lethargy-likely represents an untreated source of infection--ID following closely (?hematoma)

## 2018-04-17 NOTE — PROGRESS NOTE ADULT - SUBJECTIVE AND OBJECTIVE BOX
CHIEF COMPLAINT:    Interval Events:    REVIEW OF SYSTEMS:  Constitutional: No fevers or chills. No weight loss. No fatigue or generalized malaise.  Eyes: No itching or discharge from the eyes  ENT: No ear pain. No ear discharge. No nasal congestion. No post nasal drip. No epistaxis. No throat pain. No sore throat. No difficulty swallowing.   CV: No chest pain. No palpitations. No lightheadedness or dizziness.   Resp: No dyspnea at rest. No dyspnea on exertion. No orthopnea. No wheezing. No cough. No stridor. No sputum production. No chest pain with respiration.  GI: No nausea. No vomiting. No diarrhea.  MSK: No joint pain or pain in any extremities  Integumentary: No skin lesions. No pedal edema.  Neurological: No gross motor weakness. No sensory changes.  [ ] All other systems negative  [ ] Unable to assess ROS because ________    OBJECTIVE:  ICU Vital Signs Last 24 Hrs  T(C): 36.7 (16 Apr 2018 23:58), Max: 38.8 (16 Apr 2018 16:18)  T(F): 98 (16 Apr 2018 23:58), Max: 101.9 (16 Apr 2018 16:18)  HR: 80 (16 Apr 2018 23:58) (80 - 93)  BP: 131/71 (16 Apr 2018 23:58) (127/73 - 165/71)  BP(mean): --  ABP: --  ABP(mean): --  RR: 20 (16 Apr 2018 23:58) (18 - 20)  SpO2: 97% (16 Apr 2018 23:58) (94% - 100%)        04-15 @ 07:01 - 04-16 @ 07:00  --------------------------------------------------------  IN: 820 mL / OUT: 1300 mL / NET: -480 mL    04-16 @ 07:01  - 04-17 @ 04:52  --------------------------------------------------------  IN: 700 mL / OUT: 500 mL / NET: 200 mL      CAPILLARY BLOOD GLUCOSE          PHYSICAL EXAM:  General: Awake, alert, oriented X 3.   HEENT: Atraumatic, normocephalic.                 Mallampatti Grade                 No nasal congestion.                No tonsillar or pharyngeal exudates.  Lymph Nodes: No palpable lymphadenopathy  Neck: No JVD. No carotid bruit.   Respiratory: Normal chest expansion                         Normal percussion                         Normal and equal air entry                         No wheeze, rhonchi or rales.  Cardiovascular: S1 S2 normal. No murmurs, rubs or gallops.   Abdomen: Soft, non-tender, non-distended. No organomegaly.  Extremities: Warm to touch. Peripheral pulse palpable. No pedal edema.   Skin: No rashes or skin lesions  Neurological: Motor and sensory examination equal and normal in all four extremities.  Psychiatry: Appropriate mood and affect.    HOSPITAL MEDICATIONS:  MEDICATIONS  (STANDING):  ascorbic acid 500 milliGRAM(s) Oral daily  aspirin enteric coated 81 milliGRAM(s) Oral daily  buDESOnide 160 MICROgram(s)/formoterol 4.5 MICROgram(s) Inhaler 2 Puff(s) Inhalation two times a day  calcium carbonate 1250 mG + Vitamin D (OsCal 500 + D) 1 Tablet(s) Oral three times a day  DAPTOmycin IVPB 800 milliGRAM(s) IV Intermittent every 24 hours  docusate sodium 100 milliGRAM(s) Oral three times a day  ferrous    sulfate 325 milliGRAM(s) Oral daily  folic acid 1 milliGRAM(s) Oral daily  multivitamin 1 Tablet(s) Oral daily  nystatin    Suspension 366589 Unit(s) Oral every 6 hours  pantoprazole    Tablet 40 milliGRAM(s) Oral before breakfast  polyethylene glycol 3350 17 Gram(s) Oral daily  povidone iodine 10% Solution 1 Application(s) Topical three times a day  silver sulfADIAZINE 1% Cream 1 Application(s) Topical daily  tiotropium 18 MICROgram(s) Capsule 1 Capsule(s) Inhalation daily    MEDICATIONS  (PRN):  acetaminophen   Tablet 650 milliGRAM(s) Oral every 6 hours PRN For Temp greater than 38 C (100.4 F)  acetaminophen   Tablet. 650 milliGRAM(s) Oral every 6 hours PRN Mild Pain (1 - 3)  HYDROmorphone  Injectable 0.5 milliGRAM(s) IV Push every 6 hours PRN Severe Pain (7 - 10)  lidocaine 2% Viscous 10 milliLiter(s) Swish and Spit every 6 hours PRN Mouth Sores  oxyCODONE    IR 5 milliGRAM(s) Oral every 4 hours PRN Moderate Pain (4 - 6)  senna 2 Tablet(s) Oral at bedtime PRN Constipation      LABS:                        7.2    16.16 )-----------( 349      ( 16 Apr 2018 07:36 )             22.4     04-16    135  |  99  |  12  ----------------------------<  93  3.7   |  24  |  0.58    Ca    8.3<L>      16 Apr 2018 06:20      PT/INR - ( 16 Apr 2018 07:40 )   PT: 29.9 sec;   INR: 2.60 ratio         PTT - ( 15 Apr 2018 08:26 )  PTT:39.8 sec          MICROBIOLOGY:     RADIOLOGY:  [ ] Reviewed and interpreted by me    Point of Care Ultrasound Findings:    PFT:    EKG: CHIEF COMPLAINT: slept well, blood in urine, no sob or cough    Interval Events: PT today    REVIEW OF SYSTEMS:  Constitutional: No fevers or chills. No weight loss. + fatigue or generalized malaise.  Eyes: No itching or discharge from the eyes  ENT: No ear pain. No ear discharge. No nasal congestion. No post nasal drip. No epistaxis. No throat pain. No sore throat. No difficulty swallowing.   CV: No chest pain. No palpitations. No lightheadedness or dizziness.   Resp: No dyspnea at rest. + dyspnea on exertion. No orthopnea. No wheezing. No cough. No stridor. No sputum production. No chest pain with respiration.  GI: No nausea. No vomiting. No diarrhea.  MSK: No joint pain or pain in any extremities  Integumentary: No skin lesions. No pedal edema.  Neurological: Leg gross motor weakness. No sensory changes.  [+ ] All other systems negative  [ ] Unable to assess ROS because ________    OBJECTIVE:  ICU Vital Signs Last 24 Hrs  T(C): 36.7 (16 Apr 2018 23:58), Max: 38.8 (16 Apr 2018 16:18)  T(F): 98 (16 Apr 2018 23:58), Max: 101.9 (16 Apr 2018 16:18)  HR: 80 (16 Apr 2018 23:58) (80 - 93)  BP: 131/71 (16 Apr 2018 23:58) (127/73 - 165/71)  BP(mean): --  ABP: --  ABP(mean): --  RR: 20 (16 Apr 2018 23:58) (18 - 20)  SpO2: 97% (16 Apr 2018 23:58) (94% - 100%)        04-15 @ 07:01 - 04-16 @ 07:00  --------------------------------------------------------  IN: 820 mL / OUT: 1300 mL / NET: -480 mL    04-16 @ 07:01  - 04-17 @ 04:52  --------------------------------------------------------  IN: 700 mL / OUT: 500 mL / NET: 200 mL      CAPILLARY BLOOD GLUCOSE          PHYSICAL EXAM: NAD in bed  General: Awake, alert, oriented X 3.   HEENT: Atraumatic, normocephalic.                 Mallampatti Grade 3                No nasal congestion.                No tonsillar or pharyngeal exudates.  Lymph Nodes: No palpable lymphadenopathy  Neck: No JVD. No carotid bruit.   Respiratory: abnormal chest expansion-reduced BS bases                         Normal percussion                         Normal and equal air entry                         No wheeze, rhonchi or rales.  Cardiovascular: S1 S2 normal. No murmurs, rubs or gallops.   Abdomen: Soft, non-tender, non-distended. No organomegaly.  Extremities: Warm to touch. Peripheral pulse palpable. No pedal edema. Rt leg wrapped  Skin: No rashes or skin lesions  Neurological: Motor and sensory examination equal and normal in all four extremities.  Psychiatry: Appropriate mood and affect.    HOSPITAL MEDICATIONS:  MEDICATIONS  (STANDING):  ascorbic acid 500 milliGRAM(s) Oral daily  aspirin enteric coated 81 milliGRAM(s) Oral daily  buDESOnide 160 MICROgram(s)/formoterol 4.5 MICROgram(s) Inhaler 2 Puff(s) Inhalation two times a day  calcium carbonate 1250 mG + Vitamin D (OsCal 500 + D) 1 Tablet(s) Oral three times a day  DAPTOmycin IVPB 800 milliGRAM(s) IV Intermittent every 24 hours  docusate sodium 100 milliGRAM(s) Oral three times a day  ferrous    sulfate 325 milliGRAM(s) Oral daily  folic acid 1 milliGRAM(s) Oral daily  multivitamin 1 Tablet(s) Oral daily  nystatin    Suspension 562609 Unit(s) Oral every 6 hours  pantoprazole    Tablet 40 milliGRAM(s) Oral before breakfast  polyethylene glycol 3350 17 Gram(s) Oral daily  povidone iodine 10% Solution 1 Application(s) Topical three times a day  silver sulfADIAZINE 1% Cream 1 Application(s) Topical daily  tiotropium 18 MICROgram(s) Capsule 1 Capsule(s) Inhalation daily    MEDICATIONS  (PRN):  acetaminophen   Tablet 650 milliGRAM(s) Oral every 6 hours PRN For Temp greater than 38 C (100.4 F)  acetaminophen   Tablet. 650 milliGRAM(s) Oral every 6 hours PRN Mild Pain (1 - 3)  HYDROmorphone  Injectable 0.5 milliGRAM(s) IV Push every 6 hours PRN Severe Pain (7 - 10)  lidocaine 2% Viscous 10 milliLiter(s) Swish and Spit every 6 hours PRN Mouth Sores  oxyCODONE    IR 5 milliGRAM(s) Oral every 4 hours PRN Moderate Pain (4 - 6)  senna 2 Tablet(s) Oral at bedtime PRN Constipation      LABS:                        7.2    16.16 )-----------( 349      ( 16 Apr 2018 07:36 )             22.4     04-16    135  |  99  |  12  ----------------------------<  93  3.7   |  24  |  0.58    Ca    8.3<L>      16 Apr 2018 06:20      PT/INR - ( 16 Apr 2018 07:40 )   PT: 29.9 sec;   INR: 2.60 ratio         PTT - ( 15 Apr 2018 08:26 )  PTT:39.8 sec          MICROBIOLOGY:     RADIOLOGY:  [ ] Reviewed and interpreted by me    Point of Care Ultrasound Findings:    PFT:    EKG:

## 2018-04-17 NOTE — SWALLOW VFSS/MBS ASSESSMENT ADULT - RECOMMENDED FEEDING/EATING TECHNIQUES
alternate food with liquid/maintain upright posture during/after eating for 30 mins/small sips/bites/no straws/position upright (90 degrees)/allow for swallow between intakes/crush medication (when feasible)

## 2018-04-17 NOTE — BEHAVIORAL HEALTH ASSESSMENT NOTE - NSBHCHARTREVIEWLAB_PSY_A_CORE FT
7.2    16.83 )-----------( 401      ( 17 Apr 2018 07:37 )             22.7     04-17    135  |  98  |  11  ----------------------------<  102<H>  3.4<L>   |  28  |  0.65    Ca    8.6      17 Apr 2018 06:37

## 2018-04-17 NOTE — BEHAVIORAL HEALTH ASSESSMENT NOTE - SUMMARY
This is a 77-y.o. CF pt. with PMHx of Total Knee Replacement with recent joint infection, CAD s/p HERBERT, severe AS (s/p TAVR & PPM 12/17 [place] [employer] Model [id number]), h/o MVR, PMR on chronic steroids, asthma, OA, HLD, GERD, Anxiety, Anemia, presented with sepsis. Patient evaluated for depression, exhibiting clouding of sensorium indicative of mild delirium.

## 2018-04-17 NOTE — SWALLOW VFSS/MBS ASSESSMENT ADULT - ESOPHAGEAL STAGE
+trace-mild intraesophageal retention  +pt with episode of gagging/wretching upon presentation of 1st trial of thick puree; intraesophageal retrograde flow noted

## 2018-04-17 NOTE — SWALLOW VFSS/MBS ASSESSMENT ADULT - ORAL PHASE COMMENTS
+piecemeal deglutition majority of oral prep took place off flouro to minimize exposure; ~35s oral transit time with soft solids, ~60s with hard solids

## 2018-04-17 NOTE — BEHAVIORAL HEALTH ASSESSMENT NOTE - NSBHREFERDETAILS_PSY_A_CORE_FT
77 y.o. woman s/p left knee replacement with recurrent infections.  Now very emotional - cries.  Evaluate for depression.

## 2018-04-17 NOTE — PROGRESS NOTE ADULT - ASSESSMENT
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   per ID: complete 6 weeks of iv daptomycin (end 5/5) and then prolonged po keflex and minocycline, local wound care per plastics     #Fever/sepsis- Febrile again  Recent septic Rt TKR- abx spacer exchange and plastics complex wound closure  multifocal pna  prox thigh swelling-small hematoma   Supratherapeutic INR/coagulopathy- resolved   PMR on chr steroids    Plan  Persistant fevers,  leucocytosis  monitor  Completed Vanco and Aztreonam 7d course  switched to Dapto to cont  bld cx ngtd, f/u rpt bcx ngtd  Ultrasound Rt thigh difficult study but no e/o collection  Cardiology reeval for ? ERIKA and r/o endocarditis given persistant fevers although bld cx ngtd  f/u ID recs  f/u plastics/surgery: wound care  f/u ortho- no interventions  Dose coumadin goal Inr 2-3

## 2018-04-17 NOTE — PROGRESS NOTE ADULT - PROBLEM SELECTOR PLAN 9
abnormal CT--c/w aspiration  Sp and Sw evaluation--aspiration precautions--ABX as per ID-total of 7 d overlap then daptomycin alone abnormal CT--c/w aspiration  Sp and Sw evaluation--aspiration precautions--ABX as per ID-total of 7 d overlap-completed -no on daptomycin alone

## 2018-04-17 NOTE — SWALLOW VFSS/MBS ASSESSMENT ADULT - ADDITIONAL INFORMATION
+C-spine changes  +calcification of laryngeal structures  + ? 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

## 2018-04-17 NOTE — BEHAVIORAL HEALTH ASSESSMENT NOTE - DESCRIPTION
Total Knee Replacement with recent joint infection, CAD s/p HERBERT, severe AS (s/p TAVR & PPM 12/17 [place] [employer] Model [id number]), h/o MVR, PMR on chronic steroids, asthma, OA, HLD, GERD, Anxiety, Anemia, sepsis

## 2018-04-17 NOTE — BEHAVIORAL HEALTH ASSESSMENT NOTE - NSBHSUICPROTECTFACT_PSY_A_CORE
Identifies reasons for living/Supportive social network or family/Responsibility to family and others/Future oriented

## 2018-04-17 NOTE — BEHAVIORAL HEALTH ASSESSMENT NOTE - NSBHCHARTREVIEWVS_PSY_A_CORE FT
Vital Signs Last 24 Hrs  T(C): 36.6 (17 Apr 2018 11:47), Max: 38.8 (16 Apr 2018 16:18)  T(F): 97.9 (17 Apr 2018 11:47), Max: 101.9 (16 Apr 2018 16:18)  HR: 90 (17 Apr 2018 11:47) (80 - 93)  BP: 143/76 (17 Apr 2018 11:47) (127/73 - 144/73)  BP(mean): --  RR: 18 (17 Apr 2018 11:47) (18 - 20)  SpO2: 97% (17 Apr 2018 11:47) (94% - 100%)  T(C): 36.6 (04-17-18 @ 11:47), Max: 38.8 (04-16-18 @ 16:18)  HR: 90 (04-17-18 @ 11:47) (80 - 93)  BP: 143/76 (04-17-18 @ 11:47) (127/73 - 144/73)  RR: 18 (04-17-18 @ 11:47) (18 - 20)  SpO2: 97% (04-17-18 @ 11:47) (94% - 100%)  Wt(kg): --

## 2018-04-17 NOTE — PROGRESS NOTE ADULT - ASSESSMENT
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    She returned to rehab on IV Dapto, but sent back with fever, leukocytosis and found to have multifocal pneumonia.   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    She returned to rehab on IV Dapto, but sent back with fever, leukocytosis and found to have multifocal pneumonia.   Completed 7 days vanco and aztreonam- Cxs all negative  Dependent edema and thigh hematoma, no discreet collection on Sono, but might question hematoma as link to prior low grade fever and reactive leukocytosis-and low h/h  Remains more alert, but had episode of fever yesterday afternoon    Plan:  Continue Dapto monotherapy directed at MRSA knee infex  Follow temps and CBC/diff   re-culture if fever relapse  monitor closely on current abx regimen  local wound care as per surgery

## 2018-04-17 NOTE — SWALLOW VFSS/MBS ASSESSMENT ADULT - NS SWALLOW VFSS REC ASPIR MON
cough/gurgly voice/throat clearing/Monitor for s/s aspiration/laryngeal penetration. If noted:  D/C p.o. intake, provide non-oral nutrition/hydration/meds, and contact this service @ [phone]/pneumonia/upper respiratory infection/change of breathing pattern/fever

## 2018-04-17 NOTE — PROGRESS NOTE ADULT - SUBJECTIVE AND OBJECTIVE BOX
S: No acute events overnight. Pain controlled. Febrile to 101.9 during 4/16 day. No intervention or cultures taken at that time, patient already on daptomycin.     O:   Vital Signs Last 24 Hrs  T(C): 37 (04-17-18 @ 06:09), Max: 38.8 (04-16-18 @ 16:18)  T(F): 98.6 (04-17-18 @ 06:09), Max: 101.9 (04-16-18 @ 16:18)  HR: 91 (04-17-18 @ 06:09) (80 - 93)  BP: 144/73 (04-17-18 @ 06:09) (127/73 - 165/71)  BP(mean): --  RR: 20 (04-17-18 @ 06:09) (18 - 20)  SpO2: 96% (04-17-18 @ 06:09) (94% - 100%)    Physical Exam  RLE  thigh: blister medial thigh  surgical wound intact, eschar   5/5 ta/ehl/gcs  SILT L2-S1  2+ dp pulse                          7.2    16.16 )-----------( 349      ( 16 Apr 2018 07:36 )             22.4     16 Apr 2018 06:20    135    |  99     |  12     ----------------------------<  93     3.7     |  24     |  0.58     Ca    8.3        16 Apr 2018 06:20      PT/INR - ( 16 Apr 2018 07:40 )   PT: 29.9 sec;   INR: 2.60 ratio         PTT - ( 15 Apr 2018 08:26 )  PTT:39.8 sec      A/P: 77 year old female s/p R knee stage 1 revision  - febrile yesterday, ID to f/u  - Recommend Psych for depression counseling  - 50% PWB in brace, no knee flexion allowed, may remove brace while in bed, brace to stay at least until outpatient follow-up  - PT/OT, oob to chair  - Abx per ID  - Wound care per plastics team  - DVT ppx per medicine/cards	  - Med.Cards/Pulm/ID care appreciated  - Will continue to follow

## 2018-04-17 NOTE — PROGRESS NOTE ADULT - PROBLEM SELECTOR PLAN 4
multifactorial-leg and lung--on ABX as per ID (daptomycin)  need to reconsider other sources of infection-?endocarditis

## 2018-04-17 NOTE — SWALLOW VFSS/MBS ASSESSMENT ADULT - DIAGNOSTIC IMPRESSIONS
Patient presents with oropharyngeal dysphagia characterized primarily by impaired oral management greatest with solids, trace silent laryngeal penetration with nectar thickened liquids with retrieval, and deep silent laryngeal penetration with thin liquids with retrieval. 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 If indicated would recommend consider a GI consult. Patient presents with oropharyngeal dysphagia characterized primarily by impaired oral management greatest with solids, trace silent laryngeal penetration with nectar thickened liquids with retrieval, and deep silent laryngeal penetration with thin liquids with retrieval. No aspiration evident on exam. 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 If indicated would recommend consider a GI consult.    Disorders include: reduced lingual strength/ROM/Rate of motion, mildly reduced BOT to posterior pharyngeal wall contact, reduced hyo-laryngeal excursion, reduced laryngeal closure, mildly reduced pharyngeal contractility, and s/s of reduced supraglottic sensation.

## 2018-04-18 LAB
24R-OH-CALCIDIOL SERPL-MCNC: 19.3 NG/ML — LOW (ref 30–80)
ANION GAP SERPL CALC-SCNC: 12 MMOL/L — SIGNIFICANT CHANGE UP (ref 5–17)
ANISOCYTOSIS BLD QL: SLIGHT — SIGNIFICANT CHANGE UP
BASOPHILS # BLD AUTO: 0.05 K/UL — SIGNIFICANT CHANGE UP (ref 0–0.2)
BASOPHILS NFR BLD AUTO: 0.3 % — SIGNIFICANT CHANGE UP (ref 0–2)
BLD GP AB SCN SERPL QL: POSITIVE — SIGNIFICANT CHANGE UP
BUN SERPL-MCNC: 12 MG/DL — SIGNIFICANT CHANGE UP (ref 7–23)
CALCIUM SERPL-MCNC: 8.4 MG/DL — SIGNIFICANT CHANGE UP (ref 8.4–10.5)
CHLORIDE SERPL-SCNC: 98 MMOL/L — SIGNIFICANT CHANGE UP (ref 96–108)
CO2 SERPL-SCNC: 26 MMOL/L — SIGNIFICANT CHANGE UP (ref 22–31)
CREAT SERPL-MCNC: 0.71 MG/DL — SIGNIFICANT CHANGE UP (ref 0.5–1.3)
CULTURE RESULTS: SIGNIFICANT CHANGE UP
CULTURE RESULTS: SIGNIFICANT CHANGE UP
EOSINOPHIL # BLD AUTO: 0.2 K/UL — SIGNIFICANT CHANGE UP (ref 0–0.5)
EOSINOPHIL NFR BLD AUTO: 1.2 % — SIGNIFICANT CHANGE UP (ref 0–6)
GLUCOSE SERPL-MCNC: 95 MG/DL — SIGNIFICANT CHANGE UP (ref 70–99)
HCT VFR BLD CALC: 21.6 % — LOW (ref 34.5–45)
HCT VFR BLD CALC: 22.9 % — LOW (ref 34.5–45)
HGB BLD-MCNC: 6.7 G/DL — CRITICAL LOW (ref 11.5–15.5)
HGB BLD-MCNC: 7.4 G/DL — LOW (ref 11.5–15.5)
HYPOCHROMIA BLD QL: SLIGHT — SIGNIFICANT CHANGE UP
IMM GRANULOCYTES NFR BLD AUTO: 2.2 % — HIGH (ref 0–1.5)
INR BLD: 2.81 RATIO — HIGH (ref 0.88–1.16)
LYMPHOCYTES # BLD AUTO: 1.66 K/UL — SIGNIFICANT CHANGE UP (ref 1–3.3)
LYMPHOCYTES # BLD AUTO: 10.3 % — LOW (ref 13–44)
MANUAL SMEAR VERIFICATION: SIGNIFICANT CHANGE UP
MCHC RBC-ENTMCNC: 26.9 PG — LOW (ref 27–34)
MCHC RBC-ENTMCNC: 28.3 PG — SIGNIFICANT CHANGE UP (ref 27–34)
MCHC RBC-ENTMCNC: 31 GM/DL — LOW (ref 32–36)
MCHC RBC-ENTMCNC: 32.2 GM/DL — SIGNIFICANT CHANGE UP (ref 32–36)
MCV RBC AUTO: 86.7 FL — SIGNIFICANT CHANGE UP (ref 80–100)
MCV RBC AUTO: 87.9 FL — SIGNIFICANT CHANGE UP (ref 80–100)
MICROCYTES BLD QL: SLIGHT — SIGNIFICANT CHANGE UP
MONOCYTES # BLD AUTO: 0.96 K/UL — HIGH (ref 0–0.9)
MONOCYTES NFR BLD AUTO: 5.9 % — SIGNIFICANT CHANGE UP (ref 2–14)
NEUTROPHILS # BLD AUTO: 12.94 K/UL — HIGH (ref 1.8–7.4)
NEUTROPHILS NFR BLD AUTO: 80.1 % — HIGH (ref 43–77)
PLAT MORPH BLD: NORMAL — SIGNIFICANT CHANGE UP
PLATELET # BLD AUTO: 352 K/UL — SIGNIFICANT CHANGE UP (ref 150–400)
PLATELET # BLD AUTO: 383 K/UL — SIGNIFICANT CHANGE UP (ref 150–400)
POTASSIUM SERPL-MCNC: 3.7 MMOL/L — SIGNIFICANT CHANGE UP (ref 3.5–5.3)
POTASSIUM SERPL-SCNC: 3.7 MMOL/L — SIGNIFICANT CHANGE UP (ref 3.5–5.3)
PROTHROM AB SERPL-ACNC: 32.4 SEC — HIGH (ref 10–13.1)
RBC # BLD: 2.49 M/UL — LOW (ref 3.8–5.2)
RBC # BLD: 2.6 M/UL — LOW (ref 3.8–5.2)
RBC # FLD: 16.3 % — HIGH (ref 10.3–14.5)
RBC # FLD: 17.9 % — HIGH (ref 10.3–14.5)
RBC BLD AUTO: ABNORMAL
RH IG SCN BLD-IMP: POSITIVE — SIGNIFICANT CHANGE UP
SODIUM SERPL-SCNC: 136 MMOL/L — SIGNIFICANT CHANGE UP (ref 135–145)
SPECIMEN SOURCE: SIGNIFICANT CHANGE UP
SPECIMEN SOURCE: SIGNIFICANT CHANGE UP
WBC # BLD: 16.16 K/UL — HIGH (ref 3.8–10.5)
WBC # BLD: 19.9 K/UL — HIGH (ref 3.8–10.5)
WBC # FLD AUTO: 16.16 K/UL — HIGH (ref 3.8–10.5)
WBC # FLD AUTO: 19.9 K/UL — HIGH (ref 3.8–10.5)

## 2018-04-18 PROCEDURE — 99232 SBSQ HOSP IP/OBS MODERATE 35: CPT

## 2018-04-18 RX ORDER — SODIUM CHLORIDE 9 MG/ML
1000 INJECTION INTRAMUSCULAR; INTRAVENOUS; SUBCUTANEOUS
Qty: 0 | Refills: 0 | Status: DISCONTINUED | OUTPATIENT
Start: 2018-04-18 | End: 2018-05-05

## 2018-04-18 RX ORDER — WARFARIN SODIUM 2.5 MG/1
0.5 TABLET ORAL ONCE
Qty: 0 | Refills: 0 | Status: COMPLETED | OUTPATIENT
Start: 2018-04-18 | End: 2018-04-18

## 2018-04-18 RX ADMIN — TIOTROPIUM BROMIDE 1 CAPSULE(S): 18 CAPSULE ORAL; RESPIRATORY (INHALATION) at 11:42

## 2018-04-18 RX ADMIN — SODIUM CHLORIDE 75 MILLILITER(S): 9 INJECTION INTRAMUSCULAR; INTRAVENOUS; SUBCUTANEOUS at 16:26

## 2018-04-18 RX ADMIN — Medication 1 MILLIGRAM(S): at 11:42

## 2018-04-18 RX ADMIN — Medication 1 APPLICATION(S): at 13:18

## 2018-04-18 RX ADMIN — Medication 100 MILLIGRAM(S): at 21:08

## 2018-04-18 RX ADMIN — Medication 1 TABLET(S): at 11:42

## 2018-04-18 RX ADMIN — PANTOPRAZOLE SODIUM 40 MILLIGRAM(S): 20 TABLET, DELAYED RELEASE ORAL at 05:03

## 2018-04-18 RX ADMIN — Medication 81 MILLIGRAM(S): at 11:41

## 2018-04-18 RX ADMIN — Medication 500000 UNIT(S): at 16:27

## 2018-04-18 RX ADMIN — Medication 1 TABLET(S): at 21:08

## 2018-04-18 RX ADMIN — Medication 1 TABLET(S): at 05:03

## 2018-04-18 RX ADMIN — Medication 500 MILLIGRAM(S): at 11:42

## 2018-04-18 RX ADMIN — DAPTOMYCIN 132 MILLIGRAM(S): 500 INJECTION, POWDER, LYOPHILIZED, FOR SOLUTION INTRAVENOUS at 06:16

## 2018-04-18 RX ADMIN — Medication 100 MILLIGRAM(S): at 05:03

## 2018-04-18 RX ADMIN — BUDESONIDE AND FORMOTEROL FUMARATE DIHYDRATE 2 PUFF(S): 160; 4.5 AEROSOL RESPIRATORY (INHALATION) at 17:44

## 2018-04-18 RX ADMIN — Medication 1 TABLET(S): at 13:18

## 2018-04-18 RX ADMIN — Medication 500000 UNIT(S): at 05:03

## 2018-04-18 RX ADMIN — HYDROMORPHONE HYDROCHLORIDE 0.5 MILLIGRAM(S): 2 INJECTION INTRAMUSCULAR; INTRAVENOUS; SUBCUTANEOUS at 16:26

## 2018-04-18 RX ADMIN — HYDROMORPHONE HYDROCHLORIDE 0.5 MILLIGRAM(S): 2 INJECTION INTRAMUSCULAR; INTRAVENOUS; SUBCUTANEOUS at 21:35

## 2018-04-18 RX ADMIN — Medication 500000 UNIT(S): at 11:41

## 2018-04-18 RX ADMIN — HYDROMORPHONE HYDROCHLORIDE 0.5 MILLIGRAM(S): 2 INJECTION INTRAMUSCULAR; INTRAVENOUS; SUBCUTANEOUS at 07:53

## 2018-04-18 RX ADMIN — Medication 500000 UNIT(S): at 21:08

## 2018-04-18 RX ADMIN — HYDROMORPHONE HYDROCHLORIDE 0.5 MILLIGRAM(S): 2 INJECTION INTRAMUSCULAR; INTRAVENOUS; SUBCUTANEOUS at 08:10

## 2018-04-18 RX ADMIN — Medication 325 MILLIGRAM(S): at 11:42

## 2018-04-18 RX ADMIN — Medication 3 MILLIGRAM(S): at 21:08

## 2018-04-18 RX ADMIN — Medication 100 MILLIGRAM(S): at 13:18

## 2018-04-18 RX ADMIN — Medication 1 APPLICATION(S): at 11:43

## 2018-04-18 RX ADMIN — HYDROMORPHONE HYDROCHLORIDE 0.5 MILLIGRAM(S): 2 INJECTION INTRAMUSCULAR; INTRAVENOUS; SUBCUTANEOUS at 21:08

## 2018-04-18 RX ADMIN — BUDESONIDE AND FORMOTEROL FUMARATE DIHYDRATE 2 PUFF(S): 160; 4.5 AEROSOL RESPIRATORY (INHALATION) at 05:03

## 2018-04-18 RX ADMIN — HYDROMORPHONE HYDROCHLORIDE 0.5 MILLIGRAM(S): 2 INJECTION INTRAMUSCULAR; INTRAVENOUS; SUBCUTANEOUS at 16:45

## 2018-04-18 RX ADMIN — WARFARIN SODIUM 0.5 MILLIGRAM(S): 2.5 TABLET ORAL at 21:08

## 2018-04-18 NOTE — PROGRESS NOTE ADULT - ATTENDING COMMENTS
as above-  persistent fever and fatigue an issue--?unresolved source of temps (doubt lungs)  Pulm relatively stable-atelectasis, prior PE, asthma, cardiac Dz; abnormal CT-likely represents aspiration PNA  Inhaler regimen as outlined above; *******Sp and Sw evaluation--all meals in chair etc  DVT rx /prophylaxis  ID follow up of ABX (completed 7 day overlap then Daptomycin alone at rehab)---? Endocarditis--ERIKA---ID follow up   PT evaluation and Psych evaluation                                  DC planning as no new interventions seem to be planned-wound care follow up    Everett Kumar MD-Pulmonary   [phone]

## 2018-04-18 NOTE — PROGRESS NOTE ADULT - ASSESSMENT
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   per ID: complete 6 weeks of iv daptomycin (end 5/5) and then prolonged po keflex and minocycline, local wound care per plastics     #Fever/sepsis- Febrile again  Recent septic Rt TKR- abx spacer exchange and plastics complex wound closure  multifocal pna  prox thigh swelling-small hematoma   Supratherapeutic INR/coagulopathy- resolved   PMR on chr steroids    Plan  Persistant fevers,  leucocytosis  monitor  Completed Vanco and Aztreonam 7d course  switched to Dapto to cont  bld cx ngtd, f/u rpt bcx ngtd  Ultrasound Rt thigh difficult study but no e/o collection  Cardiology reeval for ? 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     # Fever  # Recent septic Rt TKR - abx spacer exchanged and plastics complex wound closure  # asp pna  # prox thigh swelling - small hematoma  # Supratherapeutic INR/coagulopathy- resolved  # PMR on chronic steroids    Plan:  Persistant fevers,  leucocytosis downtrending, ID following  Completed Vanco and Aztreonam 7d course  switched to Dapto to cont  bld cx ngtd, f/u rpt bcx ngtd  Ultrasound Rt thigh difficult study but no e/o collection  Cardiology reeval for ? 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     # Fever  # Recent septic Rt TKR - abx spacer exchanged and plastics complex wound closure  # asp pna  # prox thigh swelling - small hematoma  # Supratherapeutic INR/coagulopathy- resolved  # PMR on chronic steroids    Plan:  Persistant fevers,  leucocytosis down to 16 from 20, ID following  Completed Vanco and Aztreonam 7d course  switched to Dapto to cont  bld cx ngtd, f/u rpt bcx ngtd  Ultrasound Rt thigh difficult study but no e/o collection  Cardiology reeval for ? 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     # Fever  # Recent septic Rt TKR - abx spacer exchanged and plastics complex wound closure  # asp pna  # prox thigh swelling - small hematoma  # Supratherapeutic INR/coagulopathy- resolved  # PMR on chronic steroids    Plan:  afebrile past 24 hrs  leucocytosis down to 16 from 20, ID following  Completed Vanco and Aztreonam 7d course  switched to Dapto to cont  ordered for CT abd and CT femur  bld cx ngtd, f/u rpt bcx ngtd  Ultrasound Rt thigh difficult study but no e/o collection  Cardiology reeval for ? ERIKA and r/o endocarditis given persistant fevers although bld cx ngtd  appreciate plastics/surgery recs  appreciate ortho recs  Dose coumadin goal INR 2-3

## 2018-04-18 NOTE — PROGRESS NOTE ADULT - ATTENDING COMMENTS
I agree with the above note and have personally seen and examined this patient. All pertinent films have been reviewed. Please refer to clinical documentation of the history, physical examinations, data summary, and both assessment and plan as documented above and with which I agree.    pain controlled, awake and alert    RLE  thigh swelling continues to improve and soften, no erythema, eschar stable, now with fat necrosis exudate, cleaned and dressed by plastics  surgical wound intact with stable eschar, some separation of eschar medially from skin, no further exudate, dressing by plastics  no surrounding erythema around wound, minimal swelling  5/5 ta/ehl/gcs  silt l4-s1  2+ dp pulse    recommend new CT scan pelvis through RLE to eval for fluid collection, now fat necrosis with exudate about area of thigh swelling, local wound care  pending wbc today  no plans for further surgery intervention at this point in time, antibiotic spacer is eluding medication directly into knee joint space  continue IV antibiotic treatment per infectious disease   appreciate behavioral health notes  encourage her to spend majority of bed oob in bedside chair as able  remains medically ill, need for continued hospitalization and monitoring  inr resolved and now therapeutic range on coumadin  continue to optimize nutrition    I spoke with the patient and her aid today about the possibility of AKA today. We discussed possible risks and benefits of this. She will consider this as an option for the future.    Nate Bass MD  Attending Orthopedic Surgeon

## 2018-04-18 NOTE — PROGRESS NOTE ADULT - PROBLEM SELECTOR PLAN 9
abnormal CT--c/w aspiration  Sp and Sw evaluation--aspiration precautions--ABX as per ID-total of 7 d overlap-completed -no on daptomycin alone

## 2018-04-18 NOTE — PROGRESS NOTE ADULT - ASSESSMENT
Patient with infected right tkr s/p rudy, spacer for strept infection, had poor wound healing so returned 3 months after and had spacer exchange and complex wound closure with mrsa infection found. She is about 2 weeks into dapto and returns with fever, leukocytosis, ongoing pain and eschar of right knee wound and what appears to be a hematoma of the right thigh. INR has been up so that is good reason for the thigh findings.  Consider infected hematoma possible. UTI is possible. Pneumonia on CT but assymptomatic  PMR decompensation or adrenal insufficiency a consideration at well.    Pulmonary stable relative to prior admits   CT abnormal--this suggests aspiration and not active PNA w/paucity of clinical signs and sx (ABX should cover her for both infections)    Concern over persistent fevers and lethargy-likely represents an untreated source of infection--ID following closely (?hematoma)

## 2018-04-18 NOTE — PROGRESS NOTE ADULT - SUBJECTIVE AND OBJECTIVE BOX
Pt seen and examined. Doing well. No acute events o/n.     T(C): 36.6 (04-18-18 @ 04:56), Max: 37.3 (04-17-18 @ 16:44)  T(F): 97.9 (04-18-18 @ 04:56), Max: 99.2 (04-17-18 @ 16:44)  HR: 88 (04-18-18 @ 04:56) (88 - 100)  BP: 139/78 (04-18-18 @ 04:56) (128/69 - 143/76)  RR: 18 (04-18-18 @ 04:56) (18 - 18)  SpO2: 97% (04-18-18 @ 04:56) (95% - 97%)      17 Apr 2018 07:01  -  18 Apr 2018 07:00  --------------------------------------------------------  IN:    Oral Fluid: 580 mL  Total IN: 580 mL    OUT:    Indwelling Catheter - Urethral: 850 mL  Total OUT: 850 mL    Total NET: -270 mL          Exam:  Proximal thigh with ruptured bullae and some black eschar - betadine applied  Knee largely unchanged with stapled incision and large area of eschar - betadine applied                          7.2    16.83 )-----------( 401      ( 17 Apr 2018 07:37 )             22.7     04-17    135  |  98  |  11  ----------------------------<  102<H>  3.4<L>   |  28  |  0.65    Ca    8.6      17 Apr 2018 06:37        Plan:

## 2018-04-18 NOTE — PROGRESS NOTE ADULT - SUBJECTIVE AND OBJECTIVE BOX
Patient is a 77y old  Female who presents with a chief complaint of fever (11 Apr 2018 14:25)      SUBJECTIVE / OVERNIGHT EVENTS:    Patient seen and examined.       Vital Signs Last 24 Hrs  T(C): 36.6 (18 Apr 2018 04:56), Max: 37.3 (17 Apr 2018 16:44)  T(F): 97.9 (18 Apr 2018 04:56), Max: 99.2 (17 Apr 2018 16:44)  HR: 88 (18 Apr 2018 04:56) (88 - 100)  BP: 139/78 (18 Apr 2018 04:56) (128/69 - 143/76)  BP(mean): --  RR: 18 (18 Apr 2018 04:56) (18 - 18)  SpO2: 97% (18 Apr 2018 04:56) (95% - 97%)  I&O's Summary    17 Apr 2018 07:01  -  18 Apr 2018 07:00  --------------------------------------------------------  IN: 580 mL / OUT: 850 mL / NET: -270 mL        PE:  GENERAL: NAD, AAOx3  HEAD:  Atraumatic, Normocephalic  EYES: EOMI, PERRLA, conjunctiva and sclera clear  NECK: Supple, No JVD  CHEST/LUNG: CTABL, No wheeze  HEART: Regular rate and rhythm; + murmur  ABDOMEN: Soft, Nontender, Nondistended; Bowel sounds present  EXTREMITIES:  2+ Peripheral Pulses, No clubbing, cyanosis, or edema  SKIN: No rashes or lesions  NEURO: No focal deficits    LABS:                        7.2    16.83 )-----------( 401      ( 17 Apr 2018 07:37 )             22.7     04-18    136  |  98  |  12  ----------------------------<  95  3.7   |  26  |  0.71    Ca    8.4      18 Apr 2018 07:08      PT/INR - ( 17 Apr 2018 07:45 )   PT: 34.3 sec;   INR: 2.97 ratio           CAPILLARY BLOOD GLUCOSE                RADIOLOGY & ADDITIONAL TESTS:    Imaging Personally Reviewed:  [x] YES  [ ] NO    Consultant(s) Notes Reviewed:  [x] YES  [ ] NO    MEDICATIONS  (STANDING):  ascorbic acid 500 milliGRAM(s) Oral daily  aspirin enteric coated 81 milliGRAM(s) Oral daily  buDESOnide 160 MICROgram(s)/formoterol 4.5 MICROgram(s) Inhaler 2 Puff(s) Inhalation two times a day  calcium carbonate 1250 mG + Vitamin D (OsCal 500 + D) 1 Tablet(s) Oral three times a day  DAPTOmycin IVPB 800 milliGRAM(s) IV Intermittent every 24 hours  docusate sodium 100 milliGRAM(s) Oral three times a day  ferrous    sulfate 325 milliGRAM(s) Oral daily  folic acid 1 milliGRAM(s) Oral daily  melatonin 3 milliGRAM(s) Oral at bedtime  multivitamin 1 Tablet(s) Oral daily  nystatin    Suspension 199781 Unit(s) Oral every 6 hours  pantoprazole    Tablet 40 milliGRAM(s) Oral before breakfast  polyethylene glycol 3350 17 Gram(s) Oral daily  povidone iodine 10% Solution 1 Application(s) Topical three times a day  silver sulfADIAZINE 1% Cream 1 Application(s) Topical daily  tiotropium 18 MICROgram(s) Capsule 1 Capsule(s) Inhalation daily    MEDICATIONS  (PRN):  acetaminophen   Tablet 650 milliGRAM(s) Oral every 6 hours PRN For Temp greater than 38 C (100.4 F)  acetaminophen   Tablet. 650 milliGRAM(s) Oral every 6 hours PRN Mild Pain (1 - 3)  HYDROmorphone  Injectable 0.5 milliGRAM(s) IV Push every 4 hours PRN Severe Pain (7 - 10)  lidocaine 2% Viscous 10 milliLiter(s) Swish and Spit every 6 hours PRN Mouth Sores  oxyCODONE    IR 5 milliGRAM(s) Oral every 4 hours PRN Moderate Pain (4 - 6)  senna 2 Tablet(s) Oral at bedtime PRN Constipation      Care Discussed with Consultants/Other Providers [x] YES  [ ] NO    HEALTH ISSUES - PROBLEM Dx:  Adjustment disorder, unspecified type  Delirium due to another medical condition  Pneumonia: Pneumonia  Aortic stenosis, severe: Aortic stenosis, severe  MYAH (obstructive sleep apnea): MYAH (obstructive sleep apnea)  Obesity: Obesity  Asthma: Asthma  SOB (shortness of breath): SOB (shortness of breath)  Chronic deep vein thrombosis (DVT) of lower extremity, unspecified laterality, unspecified vein: Chronic deep vein thrombosis (DVT) of lower extremity, unspecified laterality, unspecified vein  Arthralgia of knee, unspecified laterality: Arthralgia of knee, unspecified laterality  Hyperlipidemia, unspecified hyperlipidemia type: Hyperlipidemia, unspecified hyperlipidemia type  Asthma, unspecified asthma severity, unspecified whether complicated, unspecified whether persistent: Asthma, unspecified asthma severity, unspecified whether complicated, unspecified whether persistent  Coronary artery disease involving native coronary artery of native heart without angina pectoris: Coronary artery disease involving native coronary artery of native heart without angina pectoris  Gastroesophageal reflux disease, esophagitis presence not specified: Gastroesophageal reflux disease, esophagitis presence not specified  Fever, unspecified fever cause: Fever, unspecified fever cause Patient is a 77y old  Female who presents with a chief complaint of fever (11 Apr 2018 14:25)      SUBJECTIVE / OVERNIGHT EVENTS:    Patient seen and examined. co pain in right knee after dressing change. denies cp sob. co pain at ulcer right thigh.      Vital Signs Last 24 Hrs  T(C): 36.6 (18 Apr 2018 04:56), Max: 37.3 (17 Apr 2018 16:44)  T(F): 97.9 (18 Apr 2018 04:56), Max: 99.2 (17 Apr 2018 16:44)  HR: 88 (18 Apr 2018 04:56) (88 - 100)  BP: 139/78 (18 Apr 2018 04:56) (128/69 - 143/76)  BP(mean): --  RR: 18 (18 Apr 2018 04:56) (18 - 18)  SpO2: 97% (18 Apr 2018 04:56) (95% - 97%)  I&O's Summary    17 Apr 2018 07:01  -  18 Apr 2018 07:00  --------------------------------------------------------  IN: 580 mL / OUT: 850 mL / NET: -270 mL        PE:  GENERAL: NAD, AAOx3, obese  HEAD:  Atraumatic, Normocephalic  EYES: EOMI, PERRLA, conjunctiva and sclera clear  NECK: Supple, No JVD  CHEST/LUNG: CTABL, No wheeze  HEART: Regular rate and rhythm; + murmur  ABDOMEN: Soft, Nontender, Nondistended; Bowel sounds present, eschar over pannus, some erythema  EXTREMITIES:  2+ Peripheral Pulses, right knee incision with staples, crusting, right thigh ulcer with serosanguinous liquid  SKIN: No rashes or lesions  NEURO: No focal deficits    LABS:                        7.2    16.83 )-----------( 401      ( 17 Apr 2018 07:37 )             22.7     04-18    136  |  98  |  12  ----------------------------<  95  3.7   |  26  |  0.71    Ca    8.4      18 Apr 2018 07:08      PT/INR - ( 17 Apr 2018 07:45 )   PT: 34.3 sec;   INR: 2.97 ratio           CAPILLARY BLOOD GLUCOSE                RADIOLOGY & ADDITIONAL TESTS:    Imaging Personally Reviewed:  [x] YES  [ ] NO    Consultant(s) Notes Reviewed:  [x] YES  [ ] NO    MEDICATIONS  (STANDING):  ascorbic acid 500 milliGRAM(s) Oral daily  aspirin enteric coated 81 milliGRAM(s) Oral daily  buDESOnide 160 MICROgram(s)/formoterol 4.5 MICROgram(s) Inhaler 2 Puff(s) Inhalation two times a day  calcium carbonate 1250 mG + Vitamin D (OsCal 500 + D) 1 Tablet(s) Oral three times a day  DAPTOmycin IVPB 800 milliGRAM(s) IV Intermittent every 24 hours  docusate sodium 100 milliGRAM(s) Oral three times a day  ferrous    sulfate 325 milliGRAM(s) Oral daily  folic acid 1 milliGRAM(s) Oral daily  melatonin 3 milliGRAM(s) Oral at bedtime  multivitamin 1 Tablet(s) Oral daily  nystatin    Suspension 800547 Unit(s) Oral every 6 hours  pantoprazole    Tablet 40 milliGRAM(s) Oral before breakfast  polyethylene glycol 3350 17 Gram(s) Oral daily  povidone iodine 10% Solution 1 Application(s) Topical three times a day  silver sulfADIAZINE 1% Cream 1 Application(s) Topical daily  tiotropium 18 MICROgram(s) Capsule 1 Capsule(s) Inhalation daily    MEDICATIONS  (PRN):  acetaminophen   Tablet 650 milliGRAM(s) Oral every 6 hours PRN For Temp greater than 38 C (100.4 F)  acetaminophen   Tablet. 650 milliGRAM(s) Oral every 6 hours PRN Mild Pain (1 - 3)  HYDROmorphone  Injectable 0.5 milliGRAM(s) IV Push every 4 hours PRN Severe Pain (7 - 10)  lidocaine 2% Viscous 10 milliLiter(s) Swish and Spit every 6 hours PRN Mouth Sores  oxyCODONE    IR 5 milliGRAM(s) Oral every 4 hours PRN Moderate Pain (4 - 6)  senna 2 Tablet(s) Oral at bedtime PRN Constipation      Care Discussed with Consultants/Other Providers [x] YES  [ ] NO    HEALTH ISSUES - PROBLEM Dx:  Adjustment disorder, unspecified type  Delirium due to another medical condition  Pneumonia: Pneumonia  Aortic stenosis, severe: Aortic stenosis, severe  MYAH (obstructive sleep apnea): MYAH (obstructive sleep apnea)  Obesity: Obesity  Asthma: Asthma  SOB (shortness of breath): SOB (shortness of breath)  Chronic deep vein thrombosis (DVT) of lower extremity, unspecified laterality, unspecified vein: Chronic deep vein thrombosis (DVT) of lower extremity, unspecified laterality, unspecified vein  Arthralgia of knee, unspecified laterality: Arthralgia of knee, unspecified laterality  Hyperlipidemia, unspecified hyperlipidemia type: Hyperlipidemia, unspecified hyperlipidemia type  Asthma, unspecified asthma severity, unspecified whether complicated, unspecified whether persistent: Asthma, unspecified asthma severity, unspecified whether complicated, unspecified whether persistent  Coronary artery disease involving native coronary artery of native heart without angina pectoris: Coronary artery disease involving native coronary artery of native heart without angina pectoris  Gastroesophageal reflux disease, esophagitis presence not specified: Gastroesophageal reflux disease, esophagitis presence not specified  Fever, unspecified fever cause: Fever, unspecified fever cause

## 2018-04-18 NOTE — PROGRESS NOTE ADULT - SUBJECTIVE AND OBJECTIVE BOX
CHIEF COMPLAINT: jweak, frustrated by wound, mild constipation    Interval Events: ID, ortho evaln    REVIEW OF SYSTEMS:  Constitutional: No fevers or chills. No weight loss. + fatigue or generalized malaise.  Eyes: No itching or discharge from the eyes  ENT: No ear pain. No ear discharge. No nasal congestion. No post nasal drip. No epistaxis. No throat pain. No sore throat. No difficulty swallowing.   CV: No chest pain. No palpitations. No lightheadedness or dizziness.   Resp: No dyspnea at rest. + dyspnea on exertion. No orthopnea. No wheezing. No cough. No stridor. No sputum production. No chest pain with respiration.  GI: No nausea. No vomiting. No diarrhea.  MSK: No joint pain or pain in any extremities  Integumentary: No skin lesions. No pedal edema.  Neurological: No gross motor weakness-except right leg. No sensory changes.  [+ ] All other systems negative  [ ] Unable to assess ROS because ________    OBJECTIVE:  ICU Vital Signs Last 24 Hrs  T(C): 36.6 (18 Apr 2018 04:56), Max: 37.3 (17 Apr 2018 16:44)  T(F): 97.9 (18 Apr 2018 04:56), Max: 99.2 (17 Apr 2018 16:44)  HR: 88 (18 Apr 2018 04:56) (88 - 100)  BP: 139/78 (18 Apr 2018 04:56) (128/69 - 144/73)  BP(mean): --  ABP: --  ABP(mean): --  RR: 18 (18 Apr 2018 04:56) (18 - 20)  SpO2: 97% (18 Apr 2018 04:56) (95% - 97%)        04-16 @ 07:01  -  04-17 @ 07:00  --------------------------------------------------------  IN: 800 mL / OUT: 570 mL / NET: 230 mL    04-17 @ 07:01  -  04-18 @ 05:21  --------------------------------------------------------  IN: 580 mL / OUT: 850 mL / NET: -270 mL      CAPILLARY BLOOD GLUCOSE          PHYSICAL EXAM: NAD on O2  General: Awake, alert, oriented X 3.   HEENT: Atraumatic, normocephalic.                 Mallampatti Grade                 No nasal congestion.                No tonsillar or pharyngeal exudates.  Lymph Nodes: No palpable lymphadenopathy  Neck: No JVD. No carotid bruit.   Respiratory: Normal chest expansion                         Normal percussion                         Normal and equal air entry                         No wheeze, rhonchi or rales.  Cardiovascular: S1 S2 normal. No murmurs, rubs or gallops.   Abdomen: Soft, non-tender, non-distended. No organomegaly.  Extremities: Warm to touch. Peripheral pulse palpable. No pedal edema. Rt leg wrapped  Skin: No rashes or skin lesions  Neurological: Motor and sensory examination equal and normal in all four extremities.  Psychiatry: Appropriate mood and affect.    HOSPITAL MEDICATIONS:  MEDICATIONS  (STANDING):  ascorbic acid 500 milliGRAM(s) Oral daily  aspirin enteric coated 81 milliGRAM(s) Oral daily  buDESOnide 160 MICROgram(s)/formoterol 4.5 MICROgram(s) Inhaler 2 Puff(s) Inhalation two times a day  calcium carbonate 1250 mG + Vitamin D (OsCal 500 + D) 1 Tablet(s) Oral three times a day  DAPTOmycin IVPB 800 milliGRAM(s) IV Intermittent every 24 hours  docusate sodium 100 milliGRAM(s) Oral three times a day  ferrous    sulfate 325 milliGRAM(s) Oral daily  folic acid 1 milliGRAM(s) Oral daily  melatonin 3 milliGRAM(s) Oral at bedtime  multivitamin 1 Tablet(s) Oral daily  nystatin    Suspension 013591 Unit(s) Oral every 6 hours  pantoprazole    Tablet 40 milliGRAM(s) Oral before breakfast  polyethylene glycol 3350 17 Gram(s) Oral daily  povidone iodine 10% Solution 1 Application(s) Topical three times a day  silver sulfADIAZINE 1% Cream 1 Application(s) Topical daily  tiotropium 18 MICROgram(s) Capsule 1 Capsule(s) Inhalation daily    MEDICATIONS  (PRN):  acetaminophen   Tablet 650 milliGRAM(s) Oral every 6 hours PRN For Temp greater than 38 C (100.4 F)  acetaminophen   Tablet. 650 milliGRAM(s) Oral every 6 hours PRN Mild Pain (1 - 3)  HYDROmorphone  Injectable 0.5 milliGRAM(s) IV Push every 4 hours PRN Severe Pain (7 - 10)  lidocaine 2% Viscous 10 milliLiter(s) Swish and Spit every 6 hours PRN Mouth Sores  oxyCODONE    IR 5 milliGRAM(s) Oral every 4 hours PRN Moderate Pain (4 - 6)  senna 2 Tablet(s) Oral at bedtime PRN Constipation      LABS:                        7.2    16.83 )-----------( 401      ( 17 Apr 2018 07:37 )             22.7     04-17    135  |  98  |  11  ----------------------------<  102<H>  3.4<L>   |  28  |  0.65    Ca    8.6      17 Apr 2018 06:37      PT/INR - ( 17 Apr 2018 07:45 )   PT: 34.3 sec;   INR: 2.97 ratio                   MICROBIOLOGY:     RADIOLOGY:  [ ] Reviewed and interpreted by me    Point of Care Ultrasound Findings:    PFT:    EKG:

## 2018-04-18 NOTE — PROGRESS NOTE ADULT - SUBJECTIVE AND OBJECTIVE BOX
CC: f/u for pneumonia and R knee MRSA septic arthritis    Patient remains weak in general, somnolent but arouses easily, c/o thigh pain    REVIEW OF SYSTEMS:  All other review of systems negative (Comprehensive ROS)    Antimicrobials Day # 25 total  DAPTOmycin IVPB 800 milliGRAM(s) IV Intermittent every 24 hours  nystatin    Suspension 739917 Unit(s) Oral every 6 hours    Other Medications Reviewed    Vital Signs Last 24 Hrs  T(F): 97.9 (18 Apr 2018 04:56), Max: 99.2 (17 Apr 2018 16:44)  HR: 88 (18 Apr 2018 04:56) (88 - 100)  BP: 139/78 (18 Apr 2018 04:56) (128/69 - 143/76)  BP(mean): --  RR: 18 (18 Apr 2018 04:56) (18 - 18)  SpO2: 97% (18 Apr 2018 04:56) (95% - 97%)    PHYSICAL EXAM:  General: alert, no acute distress  Eyes:  anicteric, no conjunctival injection, no discharge  Oropharynx: no lesions or injection 	  Neck: supple, without adenopathy  Lungs:  diminished at bases  Heart: regular rate and rhythm; no murmur, rubs or gallops  Abdomen: soft, nondistended, nontender, without mass or organomegaly  [place]  Skin: no lesions  Extremities: dependent LE edema, R upper thigh induration, ecchymosis, and ulceration persists, now with active seropurulent exudate expressed.  R knee incision intact and dry, large medial eschar, black necrosis without change.  R PICC site clean  Neurologic: alert, moves all extremities    LAB RESULTS:                        7.2    16.83 )-----------( 401      ( 17 Apr 2018 07:37 )             22.7   04-18    136  |  98  |  12  ----------------------------<  95  3.7   |  26  |  0.71    Ca    8.4      18 Apr 2018 07:08    MICROBIOLOGY:  RECENT CULTURES REVIEWED:  Culture - Blood (04.13.18 @ 11:46)    Specimen Source: .Blood Blood-Peripheral    Culture Results:   No growth to date.    Culture - Blood (04.08.18 @ 21:46)    Specimen Source: .Blood Blood-Peripheral    Culture Results:   No growth at 5 days.    RADIOLOGY REVIEWED

## 2018-04-18 NOTE — PROGRESS NOTE ADULT - ASSESSMENT
A/P: 77 year old female s/p R knee stage 1 revision  - ID following - continuing daptomycin and reculture if febrile  - Recommend Psych for depression counseling  - 50% PWB in brace, no knee flexion allowed, may remove brace while in bed, brace to stay at least until outpatient follow-up  - PT/OT, oob to chair  - Abx per ID  - Wound care per plastics team  - DVT ppx per medicine/cards	  - Med.Cards/Pulm/ID care appreciated  - FU CT abdomen/pelvis  - Will continue to follow

## 2018-04-18 NOTE — PROGRESS NOTE ADULT - SUBJECTIVE AND OBJECTIVE BOX
Pt S/E at bedside, no acute events overnight, pain controlled    Vital Signs Last 24 Hrs  T(C): 36.6 (18 Apr 2018 04:56), Max: 37.3 (17 Apr 2018 16:44)  T(F): 97.9 (18 Apr 2018 04:56), Max: 99.2 (17 Apr 2018 16:44)  HR: 88 (18 Apr 2018 04:56) (88 - 100)  BP: 139/78 (18 Apr 2018 04:56) (128/69 - 143/76)  BP(mean): --  RR: 18 (18 Apr 2018 04:56) (18 - 18)  SpO2: 97% (18 Apr 2018 04:56) (95% - 97%)    Gen: NAD    RLE  thigh: blister medial thigh  surgical wound intact, eschar   5/5 ta/ehl/gcs  SILT L2-S1  2+ dp pulse

## 2018-04-18 NOTE — PROGRESS NOTE ADULT - ASSESSMENT
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    She returned to rehab on IV Dapto, but readmitted with fever, leukocytosis and found to have multifocal pneumonia.   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

## 2018-04-19 ENCOUNTER — APPOINTMENT (OUTPATIENT)
Dept: CARDIOLOGY | Facility: CLINIC | Age: 78
End: 2018-04-19

## 2018-04-19 LAB
ANION GAP SERPL CALC-SCNC: 12 MMOL/L — SIGNIFICANT CHANGE UP (ref 5–17)
BUN SERPL-MCNC: 10 MG/DL — SIGNIFICANT CHANGE UP (ref 7–23)
CALCIUM SERPL-MCNC: 8 MG/DL — LOW (ref 8.4–10.5)
CHLORIDE SERPL-SCNC: 98 MMOL/L — SIGNIFICANT CHANGE UP (ref 96–108)
CK SERPL-CCNC: 14 U/L — LOW (ref 25–170)
CO2 SERPL-SCNC: 25 MMOL/L — SIGNIFICANT CHANGE UP (ref 22–31)
CREAT SERPL-MCNC: 0.65 MG/DL — SIGNIFICANT CHANGE UP (ref 0.5–1.3)
GLUCOSE SERPL-MCNC: 102 MG/DL — HIGH (ref 70–99)
HCT VFR BLD CALC: 22.7 % — LOW (ref 34.5–45)
HGB BLD-MCNC: 7.2 G/DL — LOW (ref 11.5–15.5)
INR BLD: 2.83 RATIO — HIGH (ref 0.88–1.16)
MCHC RBC-ENTMCNC: 27.5 PG — SIGNIFICANT CHANGE UP (ref 27–34)
MCHC RBC-ENTMCNC: 31.7 GM/DL — LOW (ref 32–36)
MCV RBC AUTO: 86.6 FL — SIGNIFICANT CHANGE UP (ref 80–100)
PLATELET # BLD AUTO: 377 K/UL — SIGNIFICANT CHANGE UP (ref 150–400)
POTASSIUM SERPL-MCNC: 3.9 MMOL/L — SIGNIFICANT CHANGE UP (ref 3.5–5.3)
POTASSIUM SERPL-SCNC: 3.9 MMOL/L — SIGNIFICANT CHANGE UP (ref 3.5–5.3)
PROTHROM AB SERPL-ACNC: 32.7 SEC — HIGH (ref 10–13.1)
RBC # BLD: 2.62 M/UL — LOW (ref 3.8–5.2)
RBC # FLD: 17.8 % — HIGH (ref 10.3–14.5)
SODIUM SERPL-SCNC: 135 MMOL/L — SIGNIFICANT CHANGE UP (ref 135–145)
WBC # BLD: 19.01 K/UL — HIGH (ref 3.8–10.5)
WBC # FLD AUTO: 19.01 K/UL — HIGH (ref 3.8–10.5)

## 2018-04-19 PROCEDURE — 99232 SBSQ HOSP IP/OBS MODERATE 35: CPT

## 2018-04-19 PROCEDURE — 74176 CT ABD & PELVIS W/O CONTRAST: CPT | Mod: 26

## 2018-04-19 PROCEDURE — 73700 CT LOWER EXTREMITY W/O DYE: CPT | Mod: 26,RT

## 2018-04-19 RX ORDER — WARFARIN SODIUM 2.5 MG/1
0.5 TABLET ORAL ONCE
Qty: 0 | Refills: 0 | Status: COMPLETED | OUTPATIENT
Start: 2018-04-19 | End: 2018-04-19

## 2018-04-19 RX ADMIN — POLYETHYLENE GLYCOL 3350 17 GRAM(S): 17 POWDER, FOR SOLUTION ORAL at 11:21

## 2018-04-19 RX ADMIN — Medication 500000 UNIT(S): at 11:22

## 2018-04-19 RX ADMIN — HYDROMORPHONE HYDROCHLORIDE 0.5 MILLIGRAM(S): 2 INJECTION INTRAMUSCULAR; INTRAVENOUS; SUBCUTANEOUS at 02:00

## 2018-04-19 RX ADMIN — Medication 1 APPLICATION(S): at 06:22

## 2018-04-19 RX ADMIN — DAPTOMYCIN 132 MILLIGRAM(S): 500 INJECTION, POWDER, LYOPHILIZED, FOR SOLUTION INTRAVENOUS at 08:21

## 2018-04-19 RX ADMIN — PANTOPRAZOLE SODIUM 40 MILLIGRAM(S): 20 TABLET, DELAYED RELEASE ORAL at 06:21

## 2018-04-19 RX ADMIN — HYDROMORPHONE HYDROCHLORIDE 0.5 MILLIGRAM(S): 2 INJECTION INTRAMUSCULAR; INTRAVENOUS; SUBCUTANEOUS at 01:29

## 2018-04-19 RX ADMIN — Medication 325 MILLIGRAM(S): at 11:20

## 2018-04-19 RX ADMIN — TIOTROPIUM BROMIDE 1 CAPSULE(S): 18 CAPSULE ORAL; RESPIRATORY (INHALATION) at 11:22

## 2018-04-19 RX ADMIN — Medication 1 APPLICATION(S): at 11:21

## 2018-04-19 RX ADMIN — Medication 500000 UNIT(S): at 06:21

## 2018-04-19 RX ADMIN — HYDROMORPHONE HYDROCHLORIDE 0.5 MILLIGRAM(S): 2 INJECTION INTRAMUSCULAR; INTRAVENOUS; SUBCUTANEOUS at 11:47

## 2018-04-19 RX ADMIN — Medication 100 MILLIGRAM(S): at 06:21

## 2018-04-19 RX ADMIN — Medication 81 MILLIGRAM(S): at 11:20

## 2018-04-19 RX ADMIN — BUDESONIDE AND FORMOTEROL FUMARATE DIHYDRATE 2 PUFF(S): 160; 4.5 AEROSOL RESPIRATORY (INHALATION) at 06:22

## 2018-04-19 RX ADMIN — HYDROMORPHONE HYDROCHLORIDE 0.5 MILLIGRAM(S): 2 INJECTION INTRAMUSCULAR; INTRAVENOUS; SUBCUTANEOUS at 06:53

## 2018-04-19 RX ADMIN — HYDROMORPHONE HYDROCHLORIDE 0.5 MILLIGRAM(S): 2 INJECTION INTRAMUSCULAR; INTRAVENOUS; SUBCUTANEOUS at 06:20

## 2018-04-19 RX ADMIN — WARFARIN SODIUM 0.5 MILLIGRAM(S): 2.5 TABLET ORAL at 22:28

## 2018-04-19 RX ADMIN — Medication 1 TABLET(S): at 22:28

## 2018-04-19 RX ADMIN — Medication 1 MILLIGRAM(S): at 11:20

## 2018-04-19 RX ADMIN — Medication 1 APPLICATION(S): at 22:30

## 2018-04-19 RX ADMIN — Medication 3 MILLIGRAM(S): at 22:28

## 2018-04-19 RX ADMIN — Medication 500000 UNIT(S): at 16:17

## 2018-04-19 RX ADMIN — HYDROMORPHONE HYDROCHLORIDE 0.5 MILLIGRAM(S): 2 INJECTION INTRAMUSCULAR; INTRAVENOUS; SUBCUTANEOUS at 22:29

## 2018-04-19 RX ADMIN — Medication 1 TABLET(S): at 11:21

## 2018-04-19 RX ADMIN — Medication 1 TABLET(S): at 06:21

## 2018-04-19 RX ADMIN — Medication 100 MILLIGRAM(S): at 16:19

## 2018-04-19 RX ADMIN — HYDROMORPHONE HYDROCHLORIDE 0.5 MILLIGRAM(S): 2 INJECTION INTRAMUSCULAR; INTRAVENOUS; SUBCUTANEOUS at 23:59

## 2018-04-19 RX ADMIN — BUDESONIDE AND FORMOTEROL FUMARATE DIHYDRATE 2 PUFF(S): 160; 4.5 AEROSOL RESPIRATORY (INHALATION) at 16:20

## 2018-04-19 RX ADMIN — Medication 500 MILLIGRAM(S): at 11:20

## 2018-04-19 RX ADMIN — Medication 1 APPLICATION(S): at 16:18

## 2018-04-19 RX ADMIN — HYDROMORPHONE HYDROCHLORIDE 0.5 MILLIGRAM(S): 2 INJECTION INTRAMUSCULAR; INTRAVENOUS; SUBCUTANEOUS at 11:17

## 2018-04-19 RX ADMIN — Medication 500000 UNIT(S): at 22:28

## 2018-04-19 RX ADMIN — Medication 1 TABLET(S): at 16:17

## 2018-04-19 NOTE — PROGRESS NOTE ADULT - SUBJECTIVE AND OBJECTIVE BOX
No acute events overnight. Pain well controlled with pain medications. Thigh swelling improving, eschar stable.    pain controlled, awake and alert    RLE  thigh swelling continues to improve and soften, no erythema, eschar stable, no further exudate  surgical wound intact with stable eschar, some separation of eschar medially from skin, no further exudate, dressing by plastics  no surrounding erythema around wound, minimal swelling  5/5 ta/ehl/gcs  silt l4-s1  2+ dp pulse        Vitals:    Exam:  Gen: NAD, thigh wound with local wound care, eschar stable  Motor: 5/5 EHL/FHL/TA/Gastrocnemius, in Andie brace  Sensory: SILT DP/SP/S/S/T nerve distributions    A/P: 77 year old female with R knee infection  - Pain Control  - ABx per ID  - Will discuss case with Dr. Olivares  - PRS wound care  - Will need medical clearance prior to possible OR for AKA No acute events overnight. Pain well controlled with pain medications. Thigh swelling improving, eschar stable.    Vital Signs Last 24 Hrs  T(C): 36.6 (19 Apr 2018 06:17), Max: 37 (18 Apr 2018 21:05)  T(F): 97.9 (19 Apr 2018 06:17), Max: 98.6 (18 Apr 2018 21:05)  HR: 88 (19 Apr 2018 06:17) (86 - 100)  BP: 138/75 (19 Apr 2018 06:17) (114/75 - 138/75)  BP(mean): --  RR: 18 (19 Apr 2018 06:17) (18 - 18)  SpO2: 97% (19 Apr 2018 06:17) (95% - 97%)    Exam:  Gen: NAD, thigh wound with local wound care, eschar stable  Motor: 5/5 EHL/FHL/TA/Gastrocnemius, in [place] brace  Sensory: SILT DP/SP/S/S/T nerve distributions    A/P: 77 year old female with R knee infection  - Pain Control  - ABx per ID  - Will discuss case with Dr. Olivares  - PRS wound care  - Will need medical clearance prior to possible OR for AKA

## 2018-04-19 NOTE — PROGRESS NOTE ADULT - SUBJECTIVE AND OBJECTIVE BOX
CHIEF COMPLAINT:    Interval Events:    REVIEW OF SYSTEMS:  Constitutional: No fevers or chills. No weight loss. No fatigue or generalized malaise.  Eyes: No itching or discharge from the eyes  ENT: No ear pain. No ear discharge. No nasal congestion. No post nasal drip. No epistaxis. No throat pain. No sore throat. No difficulty swallowing.   CV: No chest pain. No palpitations. No lightheadedness or dizziness.   Resp: No dyspnea at rest. No dyspnea on exertion. No orthopnea. No wheezing. No cough. No stridor. No sputum production. No chest pain with respiration.  GI: No nausea. No vomiting. No diarrhea.  MSK: No joint pain or pain in any extremities  Integumentary: No skin lesions. No pedal edema.  Neurological: No gross motor weakness. No sensory changes.  [ ] All other systems negative  [ ] Unable to assess ROS because ________    OBJECTIVE:  ICU Vital Signs Last 24 Hrs  T(C): 37 (18 Apr 2018 21:05), Max: 37 (18 Apr 2018 21:05)  T(F): 98.6 (18 Apr 2018 21:05), Max: 98.6 (18 Apr 2018 21:05)  HR: 87 (18 Apr 2018 21:05) (86 - 100)  BP: 137/80 (18 Apr 2018 21:05) (114/75 - 139/77)  BP(mean): --  ABP: --  ABP(mean): --  RR: 18 (18 Apr 2018 21:05) (18 - 18)  SpO2: 97% (18 Apr 2018 21:05) (95% - 97%)        04-17 @ 07:01  -  04-18 @ 07:00  --------------------------------------------------------  IN: 580 mL / OUT: 850 mL / NET: -270 mL    04-18 @ 07:01  -  04-19 @ 05:01  --------------------------------------------------------  IN: 1065 mL / OUT: 175 mL / NET: 890 mL      CAPILLARY BLOOD GLUCOSE          PHYSICAL EXAM:  General: Awake, alert, oriented X 3.   HEENT: Atraumatic, normocephalic.                 Mallampatti Grade                 No nasal congestion.                No tonsillar or pharyngeal exudates.  Lymph Nodes: No palpable lymphadenopathy  Neck: No JVD. No carotid bruit.   Respiratory: Normal chest expansion                         Normal percussion                         Normal and equal air entry                         No wheeze, rhonchi or rales.  Cardiovascular: S1 S2 normal. No murmurs, rubs or gallops.   Abdomen: Soft, non-tender, non-distended. No organomegaly.  Extremities: Warm to touch. Peripheral pulse palpable. No pedal edema.   Skin: No rashes or skin lesions  Neurological: Motor and sensory examination equal and normal in all four extremities.  Psychiatry: Appropriate mood and affect.    HOSPITAL MEDICATIONS:  MEDICATIONS  (STANDING):  ascorbic acid 500 milliGRAM(s) Oral daily  aspirin enteric coated 81 milliGRAM(s) Oral daily  buDESOnide 160 MICROgram(s)/formoterol 4.5 MICROgram(s) Inhaler 2 Puff(s) Inhalation two times a day  calcium carbonate 1250 mG + Vitamin D (OsCal 500 + D) 1 Tablet(s) Oral three times a day  DAPTOmycin IVPB 800 milliGRAM(s) IV Intermittent every 24 hours  docusate sodium 100 milliGRAM(s) Oral three times a day  ferrous    sulfate 325 milliGRAM(s) Oral daily  folic acid 1 milliGRAM(s) Oral daily  melatonin 3 milliGRAM(s) Oral at bedtime  multivitamin 1 Tablet(s) Oral daily  nystatin    Suspension 194691 Unit(s) Oral every 6 hours  pantoprazole    Tablet 40 milliGRAM(s) Oral before breakfast  polyethylene glycol 3350 17 Gram(s) Oral daily  povidone iodine 10% Solution 1 Application(s) Topical three times a day  silver sulfADIAZINE 1% Cream 1 Application(s) Topical daily  sodium chloride 0.9%. 1000 milliLiter(s) (75 mL/Hr) IV Continuous <Continuous>  tiotropium 18 MICROgram(s) Capsule 1 Capsule(s) Inhalation daily    MEDICATIONS  (PRN):  acetaminophen   Tablet 650 milliGRAM(s) Oral every 6 hours PRN For Temp greater than 38 C (100.4 F)  acetaminophen   Tablet. 650 milliGRAM(s) Oral every 6 hours PRN Mild Pain (1 - 3)  HYDROmorphone  Injectable 0.5 milliGRAM(s) IV Push every 4 hours PRN Severe Pain (7 - 10)  lidocaine 2% Viscous 10 milliLiter(s) Swish and Spit every 6 hours PRN Mouth Sores  oxyCODONE    IR 5 milliGRAM(s) Oral every 4 hours PRN Moderate Pain (4 - 6)  senna 2 Tablet(s) Oral at bedtime PRN Constipation      LABS:                        7.4    19.9  )-----------( 383      ( 18 Apr 2018 13:30 )             22.9     04-18    136  |  98  |  12  ----------------------------<  95  3.7   |  26  |  0.71    Ca    8.4      18 Apr 2018 07:08      PT/INR - ( 18 Apr 2018 09:34 )   PT: 32.4 sec;   INR: 2.81 ratio                   MICROBIOLOGY:     RADIOLOGY:  [ ] Reviewed and interpreted by me    Point of Care Ultrasound Findings:    PFT:    EKG: CHIEF COMPLAINT: just not feeling right-weakness, no sob or cough or wheeze, no appetite    Interval Events: ID, ortho, plastics    REVIEW OF SYSTEMS:  Constitutional: No fevers or chills. No weight loss. + fatigue or generalized malaise.  Eyes: No itching or discharge from the eyes  ENT: No ear pain. No ear discharge. No nasal congestion. No post nasal drip. No epistaxis. No throat pain. No sore throat. No difficulty swallowing.   CV: No chest pain. No palpitations. No lightheadedness or dizziness.   Resp: No dyspnea at rest. No dyspnea on exertion. No orthopnea. No wheezing. No cough. No stridor. No sputum production. No chest pain with respiration.  GI: No nausea. No vomiting. No diarrhea.  MSK: No joint pain or pain in any extremities  Integumentary: No skin lesions. No pedal edema.  Neurological: ++ leg  motor weakness. No sensory changes.  [+ ] All other systems negative  [ ] Unable to assess ROS because ________    OBJECTIVE:  ICU Vital Signs Last 24 Hrs  T(C): 37 (18 Apr 2018 21:05), Max: 37 (18 Apr 2018 21:05)  T(F): 98.6 (18 Apr 2018 21:05), Max: 98.6 (18 Apr 2018 21:05)  HR: 87 (18 Apr 2018 21:05) (86 - 100)  BP: 137/80 (18 Apr 2018 21:05) (114/75 - 139/77)  BP(mean): --  ABP: --  ABP(mean): --  RR: 18 (18 Apr 2018 21:05) (18 - 18)  SpO2: 97% (18 Apr 2018 21:05) (95% - 97%)        04-17 @ 07:01  -  04-18 @ 07:00  --------------------------------------------------------  IN: 580 mL / OUT: 850 mL / NET: -270 mL    04-18 @ 07:01  -  04-19 @ 05:01  --------------------------------------------------------  IN: 1065 mL / OUT: 175 mL / NET: 890 mL      CAPILLARY BLOOD GLUCOSE          PHYSICAL EXAM: NAD in bed  General: Awake, alert, oriented X 3.   HEENT: Atraumatic, normocephalic.                 Mallampatti Grade 3                No nasal congestion.                No tonsillar or pharyngeal exudates.  Lymph Nodes: No palpable lymphadenopathy  Neck: No JVD. No carotid bruit.   Respiratory: abnormal chest expansion-reduced BS at bases                         Normal percussion                         reduced but  equal air entry                         No wheeze, rhonchi or rales.  Cardiovascular: S1 S2 normal. No murmurs, rubs or gallops.   Abdomen: Soft, non-tender, non-distended. No organomegaly.  Extremities: Warm to touch. Peripheral pulse palpable. No pedal edema. right leg wound-wrapped/brace  Skin: No rashes or skin lesions  Neurological: Motor and sensory examination equal and normal in all four extremities.  Psychiatry: Appropriate mood and affect.    HOSPITAL MEDICATIONS:  MEDICATIONS  (STANDING):  ascorbic acid 500 milliGRAM(s) Oral daily  aspirin enteric coated 81 milliGRAM(s) Oral daily  buDESOnide 160 MICROgram(s)/formoterol 4.5 MICROgram(s) Inhaler 2 Puff(s) Inhalation two times a day  calcium carbonate 1250 mG + Vitamin D (OsCal 500 + D) 1 Tablet(s) Oral three times a day  DAPTOmycin IVPB 800 milliGRAM(s) IV Intermittent every 24 hours  docusate sodium 100 milliGRAM(s) Oral three times a day  ferrous    sulfate 325 milliGRAM(s) Oral daily  folic acid 1 milliGRAM(s) Oral daily  melatonin 3 milliGRAM(s) Oral at bedtime  multivitamin 1 Tablet(s) Oral daily  nystatin    Suspension 401583 Unit(s) Oral every 6 hours  pantoprazole    Tablet 40 milliGRAM(s) Oral before breakfast  polyethylene glycol 3350 17 Gram(s) Oral daily  povidone iodine 10% Solution 1 Application(s) Topical three times a day  silver sulfADIAZINE 1% Cream 1 Application(s) Topical daily  sodium chloride 0.9%. 1000 milliLiter(s) (75 mL/Hr) IV Continuous <Continuous>  tiotropium 18 MICROgram(s) Capsule 1 Capsule(s) Inhalation daily    MEDICATIONS  (PRN):  acetaminophen   Tablet 650 milliGRAM(s) Oral every 6 hours PRN For Temp greater than 38 C (100.4 F)  acetaminophen   Tablet. 650 milliGRAM(s) Oral every 6 hours PRN Mild Pain (1 - 3)  HYDROmorphone  Injectable 0.5 milliGRAM(s) IV Push every 4 hours PRN Severe Pain (7 - 10)  lidocaine 2% Viscous 10 milliLiter(s) Swish and Spit every 6 hours PRN Mouth Sores  oxyCODONE    IR 5 milliGRAM(s) Oral every 4 hours PRN Moderate Pain (4 - 6)  senna 2 Tablet(s) Oral at bedtime PRN Constipation      LABS:                        7.4    19.9  )-----------( 383      ( 18 Apr 2018 13:30 )             22.9     04-18    136  |  98  |  12  ----------------------------<  95  3.7   |  26  |  0.71    Ca    8.4      18 Apr 2018 07:08      PT/INR - ( 18 Apr 2018 09:34 )   PT: 32.4 sec;   INR: 2.81 ratio                   MICROBIOLOGY:     RADIOLOGY:  [ ] Reviewed and interpreted by me    Point of Care Ultrasound Findings:    PFT:    EKG:

## 2018-04-19 NOTE — PROGRESS NOTE ADULT - ASSESSMENT
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    She returned to rehab on IV Dapto, but readmitted with fever, leukocytosis and found to have multifocal pneumonia.   Completed 7 days vanco and aztreonam  Dependent edema and thigh hematoma, no discreet collection on Sono previously, but now with seropurulent drainage- ?secondary infection  Afebrile past 24 hrs, Bld Cxs all negative, still with rising WBC which is likely due to ongoing wound/soft tissue infection    Plan:  Continue Dapto monotherapy targeting MRSA   Follow temps and CBC/diff  ortho input noted and awaiting further decision re: repeat imaging studies and possible AKA

## 2018-04-19 NOTE — PROGRESS NOTE ADULT - ASSESSMENT
Patient with infected right tkr s/p rudy, spacer for strept infection, had poor wound healing so returned 3 months after and had spacer exchange and complex wound closure with mrsa infection found. She is about 2 weeks into dapto and returns with fever, leukocytosis, ongoing pain and eschar of right knee wound and what appears to be a hematoma of the right thigh. INR has been up so that is good reason for the thigh findings.  Consider infected hematoma possible. UTI is possible. Pneumonia on CT but assymptomatic  PMR decompensation or adrenal insufficiency a consideration at well.    Pulmonary stable relative to prior admits   CT abnormal--this suggests aspiration and not active PNA w/paucity of clinical signs and sx (ABX should cover her for both infections)    Concern over persistent fevers and lethargy-likely represents an untreated source of infection--ID following closely (?hematoma) Patient with infected right tkr s/p rudy, spacer for strept infection, had poor wound healing so returned 3 months after and had spacer exchange and complex wound closure with mrsa infection found. She is about 2 weeks into dapto and returns with fever, leukocytosis, ongoing pain and eschar of right knee wound and what appears to be a hematoma of the right thigh. INR has been up so that is good reason for the thigh findings.  Consider infected hematoma possible. UTI is possible. Pneumonia on CT but assymptomatic  PMR decompensation or adrenal insufficiency a consideration at well.    Pulmonary stable relative to prior admits   CT abnormal--this suggests aspiration and not active PNA w/paucity of clinical signs and sx (ABX should cover her for both infections)    Concern over persistent fevers and lethargy-likely represents an untreated source of infection--ID following closely (?hematoma)--now afebrile for days as of 4/18

## 2018-04-19 NOTE — PROGRESS NOTE ADULT - PROBLEM SELECTOR PLAN 9
abnormal CT--c/w aspiration  Sp and Sw evaluation--aspiration precautions--ABX as per ID-total of 7 d overlap-completed -no on daptomycin alone abnormal CT--c/w aspiration  Sp and Sw evaluation--aspiration precautions--ABX as per ID-total of 7 d overlap-completed -now on daptomycin alone

## 2018-04-19 NOTE — PROGRESS NOTE ADULT - ATTENDING COMMENTS
as above-  persistent fever and fatigue an issue--?unresolved source of temps (doubt lungs)  Pulm relatively stable-atelectasis, prior PE, asthma, cardiac Dz; abnormal CT-likely represents aspiration PNA  Inhaler regimen as outlined above; *******Sp and Sw evaluation--all meals in chair etc  DVT rx /prophylaxis  ID follow up of ABX (completed 7 day overlap then Daptomycin alone at rehab)---? Endocarditis--ERIKA---ID follow up   PT evaluation and Psych evaluation                                  DC planning as no new interventions seem to be planned-wound care follow up    Everett Kumar MD-Pulmonary   [phone] as above-  persistent fever and fatigue an issue--?unresolved source of temps (doubt lungs)  Pulm relatively stable-atelectasis, prior PE, asthma, cardiac Dz; abnormal CT-likely represents aspiration PNA  Inhaler regimen as outlined above; *******Sp and Sw evaluation--all meals in chair etc  DVT rx /prophylaxis  ID follow up of ABX (completed 7 day overlap then Daptomycin alone at rehab)---? Endocarditis--ERIKA--if fevers recur--ID follow up   PT evaluation and Psych evaluation                                  DC planning as no new interventions seem to be planned-wound care follow up    Everett Kumar MD-Pulmonary   [phone]

## 2018-04-19 NOTE — PROGRESS NOTE ADULT - SUBJECTIVE AND OBJECTIVE BOX
Patient is a 77y old  Female who presents with a chief complaint of fever (11 Apr 2018 14:25)      SUBJECTIVE / OVERNIGHT EVENTS:    Patient seen and examined. denies complaints. no acute events. Pending CT femur.      Vital Signs Last 24 Hrs  T(C): 36.6 (19 Apr 2018 06:17), Max: 37 (18 Apr 2018 21:05)  T(F): 97.9 (19 Apr 2018 06:17), Max: 98.6 (18 Apr 2018 21:05)  HR: 88 (19 Apr 2018 06:17) (86 - 100)  BP: 138/75 (19 Apr 2018 06:17) (114/75 - 139/77)  BP(mean): --  RR: 18 (19 Apr 2018 06:17) (18 - 18)  SpO2: 97% (19 Apr 2018 06:17) (95% - 97%)  I&O's Summary    18 Apr 2018 07:01  -  19 Apr 2018 07:00  --------------------------------------------------------  IN: 1065 mL / OUT: 175 mL / NET: 890 mL        PE:  GENERAL: NAD, AAOx3, obese  HEAD:  Atraumatic, Normocephalic  EYES: EOMI, PERRLA, conjunctiva and sclera clear  NECK: Supple  CHEST/LUNG: CTABL, No wheeze  HEART: Regular rate and rhythm; + murmur  ABDOMEN: Soft, Nontender, Nondistended; Bowel sounds present, eschar over pannus, some erythema  : lund  EXTREMITIES:  2+ Peripheral Pulses, right knee incision with staples, crusting, in splint, right thigh bullae with serosanguinous liquid output  NEURO: No focal deficits    LABS:                        7.4    19.9  )-----------( 383      ( 18 Apr 2018 13:30 )             22.9     04-19    135  |  98  |  10  ----------------------------<  102<H>  3.9   |  25  |  0.65    Ca    8.0<L>      19 Apr 2018 07:33      PT/INR - ( 19 Apr 2018 09:38 )   PT: 32.7 sec;   INR: 2.83 ratio           CAPILLARY BLOOD GLUCOSE        CARDIAC MARKERS ( 19 Apr 2018 07:33 )  x     / x     / 14 U/L / x     / x              RADIOLOGY & ADDITIONAL TESTS:    Imaging Personally Reviewed:  [x] YES  [ ] NO    Consultant(s) Notes Reviewed:  [x] YES  [ ] NO    MEDICATIONS  (STANDING):  ascorbic acid 500 milliGRAM(s) Oral daily  aspirin enteric coated 81 milliGRAM(s) Oral daily  buDESOnide 160 MICROgram(s)/formoterol 4.5 MICROgram(s) Inhaler 2 Puff(s) Inhalation two times a day  calcium carbonate 1250 mG + Vitamin D (OsCal 500 + D) 1 Tablet(s) Oral three times a day  DAPTOmycin IVPB 800 milliGRAM(s) IV Intermittent every 24 hours  docusate sodium 100 milliGRAM(s) Oral three times a day  ferrous    sulfate 325 milliGRAM(s) Oral daily  folic acid 1 milliGRAM(s) Oral daily  melatonin 3 milliGRAM(s) Oral at bedtime  multivitamin 1 Tablet(s) Oral daily  nystatin    Suspension 917052 Unit(s) Oral every 6 hours  pantoprazole    Tablet 40 milliGRAM(s) Oral before breakfast  polyethylene glycol 3350 17 Gram(s) Oral daily  povidone iodine 10% Solution 1 Application(s) Topical three times a day  silver sulfADIAZINE 1% Cream 1 Application(s) Topical daily  sodium chloride 0.9%. 1000 milliLiter(s) (75 mL/Hr) IV Continuous <Continuous>  tiotropium 18 MICROgram(s) Capsule 1 Capsule(s) Inhalation daily    MEDICATIONS  (PRN):  acetaminophen   Tablet 650 milliGRAM(s) Oral every 6 hours PRN For Temp greater than 38 C (100.4 F)  acetaminophen   Tablet. 650 milliGRAM(s) Oral every 6 hours PRN Mild Pain (1 - 3)  HYDROmorphone  Injectable 0.5 milliGRAM(s) IV Push every 4 hours PRN Severe Pain (7 - 10)  lidocaine 2% Viscous 10 milliLiter(s) Swish and Spit every 6 hours PRN Mouth Sores  oxyCODONE    IR 5 milliGRAM(s) Oral every 4 hours PRN Moderate Pain (4 - 6)  senna 2 Tablet(s) Oral at bedtime PRN Constipation      Care Discussed with Consultants/Other Providers [x] YES  [ ] NO    HEALTH ISSUES - PROBLEM Dx:  Adjustment disorder, unspecified type  Delirium due to another medical condition  Pneumonia: Pneumonia  Aortic stenosis, severe: Aortic stenosis, severe  MYAH (obstructive sleep apnea): MYAH (obstructive sleep apnea)  Obesity: Obesity  Asthma: Asthma  SOB (shortness of breath): SOB (shortness of breath)  Chronic deep vein thrombosis (DVT) of lower extremity, unspecified laterality, unspecified vein: Chronic deep vein thrombosis (DVT) of lower extremity, unspecified laterality, unspecified vein  Arthralgia of knee, unspecified laterality: Arthralgia of knee, unspecified laterality  Hyperlipidemia, unspecified hyperlipidemia type: Hyperlipidemia, unspecified hyperlipidemia type  Asthma, unspecified asthma severity, unspecified whether complicated, unspecified whether persistent: Asthma, unspecified asthma severity, unspecified whether complicated, unspecified whether persistent  Coronary artery disease involving native coronary artery of native heart without angina pectoris: Coronary artery disease involving native coronary artery of native heart without angina pectoris  Gastroesophageal reflux disease, esophagitis presence not specified: Gastroesophageal reflux disease, esophagitis presence not specified  Fever, unspecified fever cause: Fever, unspecified fever cause

## 2018-04-19 NOTE — PROGRESS NOTE ADULT - SUBJECTIVE AND OBJECTIVE BOX
pain controlled, awake and alert    RLE  thigh swelling continues to improve and soften, no erythema, eschar stable, no further exudate  surgical wound intact with stable eschar, some separation of eschar medially from skin, no further exudate, dressing by plastics  no surrounding erythema around wound, minimal swelling  5/5 ta/ehl/gcs  silt l4-s1  2+ dp pulse

## 2018-04-19 NOTE — PROGRESS NOTE ADULT - ASSESSMENT
pending CT scan pelvis through RLE to eval for fluid collection  local wound care  no plans for further surgery intervention at this point in time, antibiotic spacer is eluding medication directly into knee joint space  continue IV antibiotic treatment per infectious disease   encourage her to spend majority of bed oob in bedside chair as able  remains medically ill, need for continued hospitalization and monitoring  coumadin, inr goal 2-3  continue to optimize nutrition    The patient told me today that she would like to proceed with AKA to cl	ear this infection. We discussed some aspects of what this entails as well as what she can expect. My partner, Dr. Olivares is also going to be speaking with her  and the patient further about this as well. We would like to review the CT scan prior to surgery (no date planned at this time) to ensure no further fluid collections elsewhere.    Nate Bass MD  Attending Orthopedic Surgeon

## 2018-04-19 NOTE — PROGRESS NOTE ADULT - ASSESSMENT
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     # Fever, resolved  # Recent septic Rt TKR - abx spacer exchanged and plastics complex wound closure  # asp pna  # prox thigh swelling - small hematoma  # Supratherapeutic INR/coagulopathy- resolved  # PMR on chronic steroids    Plan:  remains afebrile  Completed Vanco and Aztreonam 7d course, cont Dapto per ID  BC NTD, no indication for ERIKA and endocarditis workup as dw Dr. Chan  ordered for CT abd and CT femur  Ultrasound Rt thigh difficult study but no e/o collection  appreciate plastics/surgery recs, local wound care  appreciate ortho recs  Dose coumadin goal INR 2-3  appreciate ortho recs, eventual AKA likely    plan of care dw patient and NP

## 2018-04-19 NOTE — PROGRESS NOTE ADULT - ATTENDING COMMENTS
I agree with the above note and have personally seen and examined this patient. All pertinent films have been reviewed. Please refer to clinical documentation of the history, physical examinations, data summary, and both assessment and plan as documented above and with which I agree.    refer to prior attending note    Nate Bass MD  Attending Orthopedic Surgeon

## 2018-04-19 NOTE — CHART NOTE - NSCHARTNOTEFT_GEN_A_CORE
Notified by RN for pt feeling chest tightness. Pt seen and evaluated at bedside, found sleeping and in NAD. States she felt a pulling sensation over her chest earlier today when she went for CT, attributes sensation to being cold, sensation has since resolved on its own. Denies any current chest tightness, chest pain, SOB, wheezing. SpO2 currently 94% on nasal cannula.     BP: 126/76, HR: 90, T: 97.7F, RR: 20, SpO2: 94%    #Chest tightness, likely asthma-related. Now resolved.  - VSS. Continue with nasal cannula to maintain spO2 >90%.  - Continue with symbicort, spiriva, albuterol as per pulm.  - Incentive spirometry, chest PT.  Will endorse to primary team in AM.    Whitney Roblero PA-C  #[id number]

## 2018-04-19 NOTE — PROGRESS NOTE ADULT - SUBJECTIVE AND OBJECTIVE BOX
CC: f/u for pneumonia and R knee MRSA septic arthritis    Patient remains weak, no fevers, c/o thigh pain-unchanged    REVIEW OF SYSTEMS:  All other review of systems negative (Comprehensive ROS)    Antimicrobials Day #26 total  DAPTOmycin IVPB 800 milliGRAM(s) IV Intermittent every 24 hours  nystatin    Suspension 424233 Unit(s) Oral every 6 hours    Other Medications Reviewed    Vital Signs Last 24 Hrs  T(C): 36.6 (19 Apr 2018 06:17), Max: 37 (18 Apr 2018 21:05)  T(F): 97.9 (19 Apr 2018 06:17), Max: 98.6 (18 Apr 2018 21:05)  HR: 88 (19 Apr 2018 06:17) (87 - 100)  BP: 138/75 (19 Apr 2018 06:17) (114/75 - 138/75)  BP(mean): --  RR: 18 (19 Apr 2018 06:17) (18 - 18)  SpO2: 97% (19 Apr 2018 06:17) (95% - 97%)    PHYSICAL EXAM:  General: alert, no acute distress  Eyes:  anicteric, no conjunctival injection, no discharge  Oropharynx: no lesions or injection 	  Neck: supple, without adenopathy  Lungs:  diminished at bases  Heart: regular rate and rhythm; no murmur, rubs or gallops  Abdomen: soft, nondistended, nontender, without mass or organomegaly  [place]  Skin: no lesions  Extremities: dependent LE edema, R upper thigh induration, ecchymosis, and ulceration persists, now with active seropurulent exudate expressed.  R knee incision intact and dry, large medial eschar, black necrosis without change.  R PICC site clean  Neurologic: alert, moves all extremities    LAB RESULTS:                                     7.2    19.01 )-----------( 377      ( 19 Apr 2018 09:38 )             22.7   04-19    135  |  98  |  10  ----------------------------<  102<H>  3.9   |  25  |  0.65    Ca    8.0<L>      19 Apr 2018 07:33        MICROBIOLOGY:  RECENT CULTURES REVIEWED    RADIOLOGY REVIEWED

## 2018-04-19 NOTE — PROGRESS NOTE ADULT - PROBLEM SELECTOR PLAN 4
multifactorial-leg and lung--on ABX as per ID (daptomycin)  need to reconsider other sources of infection-?endocarditis multifactorial-leg and lung--on ABX as per ID (daptomycin)  need to reconsider other sources of infection-?endocarditis-if fevers persist

## 2018-04-20 LAB
ANION GAP SERPL CALC-SCNC: 10 MMOL/L — SIGNIFICANT CHANGE UP (ref 5–17)
BASOPHILS # BLD AUTO: 0.06 K/UL — SIGNIFICANT CHANGE UP (ref 0–0.2)
BASOPHILS NFR BLD AUTO: 0.4 % — SIGNIFICANT CHANGE UP (ref 0–2)
BUN SERPL-MCNC: 8 MG/DL — SIGNIFICANT CHANGE UP (ref 7–23)
CALCIUM SERPL-MCNC: 8.2 MG/DL — LOW (ref 8.4–10.5)
CHLORIDE SERPL-SCNC: 99 MMOL/L — SIGNIFICANT CHANGE UP (ref 96–108)
CO2 SERPL-SCNC: 26 MMOL/L — SIGNIFICANT CHANGE UP (ref 22–31)
CREAT SERPL-MCNC: 0.65 MG/DL — SIGNIFICANT CHANGE UP (ref 0.5–1.3)
EOSINOPHIL # BLD AUTO: 0.26 K/UL — SIGNIFICANT CHANGE UP (ref 0–0.5)
EOSINOPHIL NFR BLD AUTO: 1.6 % — SIGNIFICANT CHANGE UP (ref 0–6)
FOLATE SERPL-MCNC: 14.2 NG/ML — SIGNIFICANT CHANGE UP
GLUCOSE SERPL-MCNC: 98 MG/DL — SIGNIFICANT CHANGE UP (ref 70–99)
HCT VFR BLD CALC: 22 % — LOW (ref 34.5–45)
HGB BLD-MCNC: 6.9 G/DL — CRITICAL LOW (ref 11.5–15.5)
IMM GRANULOCYTES NFR BLD AUTO: 1 % — SIGNIFICANT CHANGE UP (ref 0–1.5)
INR BLD: 2.63 RATIO — HIGH (ref 0.88–1.16)
LYMPHOCYTES # BLD AUTO: 1.72 K/UL — SIGNIFICANT CHANGE UP (ref 1–3.3)
LYMPHOCYTES # BLD AUTO: 10.5 % — LOW (ref 13–44)
MCHC RBC-ENTMCNC: 27.3 PG — SIGNIFICANT CHANGE UP (ref 27–34)
MCHC RBC-ENTMCNC: 31.4 GM/DL — LOW (ref 32–36)
MCV RBC AUTO: 87 FL — SIGNIFICANT CHANGE UP (ref 80–100)
MONOCYTES # BLD AUTO: 0.88 K/UL — SIGNIFICANT CHANGE UP (ref 0–0.9)
MONOCYTES NFR BLD AUTO: 5.4 % — SIGNIFICANT CHANGE UP (ref 2–14)
NEUTROPHILS # BLD AUTO: 13.35 K/UL — HIGH (ref 1.8–7.4)
NEUTROPHILS NFR BLD AUTO: 81.1 % — HIGH (ref 43–77)
PLATELET # BLD AUTO: 350 K/UL — SIGNIFICANT CHANGE UP (ref 150–400)
POTASSIUM SERPL-MCNC: 3.7 MMOL/L — SIGNIFICANT CHANGE UP (ref 3.5–5.3)
POTASSIUM SERPL-SCNC: 3.7 MMOL/L — SIGNIFICANT CHANGE UP (ref 3.5–5.3)
PROTHROM AB SERPL-ACNC: 30.3 SEC — HIGH (ref 10–13.1)
RBC # BLD: 2.53 M/UL — LOW (ref 3.8–5.2)
RBC # FLD: 17.5 % — HIGH (ref 10.3–14.5)
SODIUM SERPL-SCNC: 135 MMOL/L — SIGNIFICANT CHANGE UP (ref 135–145)
TSH SERPL-MCNC: 2.01 UIU/ML — SIGNIFICANT CHANGE UP (ref 0.27–4.2)
WBC # BLD: 16.44 K/UL — HIGH (ref 3.8–10.5)
WBC # FLD AUTO: 16.44 K/UL — HIGH (ref 3.8–10.5)

## 2018-04-20 PROCEDURE — 99232 SBSQ HOSP IP/OBS MODERATE 35: CPT

## 2018-04-20 PROCEDURE — 99233 SBSQ HOSP IP/OBS HIGH 50: CPT

## 2018-04-20 RX ORDER — WARFARIN SODIUM 2.5 MG/1
0.5 TABLET ORAL ONCE
Qty: 0 | Refills: 0 | Status: DISCONTINUED | OUTPATIENT
Start: 2018-04-20 | End: 2018-04-20

## 2018-04-20 RX ORDER — WARFARIN SODIUM 2.5 MG/1
1 TABLET ORAL ONCE
Qty: 0 | Refills: 0 | Status: COMPLETED | OUTPATIENT
Start: 2018-04-20 | End: 2018-04-20

## 2018-04-20 RX ADMIN — Medication 1 APPLICATION(S): at 12:02

## 2018-04-20 RX ADMIN — Medication 1 TABLET(S): at 05:18

## 2018-04-20 RX ADMIN — Medication 1 TABLET(S): at 13:27

## 2018-04-20 RX ADMIN — DAPTOMYCIN 132 MILLIGRAM(S): 500 INJECTION, POWDER, LYOPHILIZED, FOR SOLUTION INTRAVENOUS at 11:27

## 2018-04-20 RX ADMIN — Medication 1 MILLIGRAM(S): at 11:31

## 2018-04-20 RX ADMIN — HYDROMORPHONE HYDROCHLORIDE 0.5 MILLIGRAM(S): 2 INJECTION INTRAMUSCULAR; INTRAVENOUS; SUBCUTANEOUS at 06:40

## 2018-04-20 RX ADMIN — HYDROMORPHONE HYDROCHLORIDE 0.5 MILLIGRAM(S): 2 INJECTION INTRAMUSCULAR; INTRAVENOUS; SUBCUTANEOUS at 12:14

## 2018-04-20 RX ADMIN — Medication 1 APPLICATION(S): at 05:19

## 2018-04-20 RX ADMIN — Medication 325 MILLIGRAM(S): at 11:32

## 2018-04-20 RX ADMIN — HYDROMORPHONE HYDROCHLORIDE 0.5 MILLIGRAM(S): 2 INJECTION INTRAMUSCULAR; INTRAVENOUS; SUBCUTANEOUS at 07:10

## 2018-04-20 RX ADMIN — TIOTROPIUM BROMIDE 1 CAPSULE(S): 18 CAPSULE ORAL; RESPIRATORY (INHALATION) at 11:31

## 2018-04-20 RX ADMIN — Medication 81 MILLIGRAM(S): at 11:31

## 2018-04-20 RX ADMIN — HYDROMORPHONE HYDROCHLORIDE 0.5 MILLIGRAM(S): 2 INJECTION INTRAMUSCULAR; INTRAVENOUS; SUBCUTANEOUS at 18:38

## 2018-04-20 RX ADMIN — Medication 1 APPLICATION(S): at 22:53

## 2018-04-20 RX ADMIN — Medication 500000 UNIT(S): at 18:05

## 2018-04-20 RX ADMIN — HYDROMORPHONE HYDROCHLORIDE 0.5 MILLIGRAM(S): 2 INJECTION INTRAMUSCULAR; INTRAVENOUS; SUBCUTANEOUS at 23:30

## 2018-04-20 RX ADMIN — Medication 1 TABLET(S): at 22:53

## 2018-04-20 RX ADMIN — Medication 500000 UNIT(S): at 22:52

## 2018-04-20 RX ADMIN — PANTOPRAZOLE SODIUM 40 MILLIGRAM(S): 20 TABLET, DELAYED RELEASE ORAL at 05:24

## 2018-04-20 RX ADMIN — Medication 500000 UNIT(S): at 09:47

## 2018-04-20 RX ADMIN — Medication 3 MILLIGRAM(S): at 22:53

## 2018-04-20 RX ADMIN — Medication 500 MILLIGRAM(S): at 11:31

## 2018-04-20 RX ADMIN — Medication 1 TABLET(S): at 11:31

## 2018-04-20 RX ADMIN — Medication 500000 UNIT(S): at 05:18

## 2018-04-20 RX ADMIN — HYDROMORPHONE HYDROCHLORIDE 0.5 MILLIGRAM(S): 2 INJECTION INTRAMUSCULAR; INTRAVENOUS; SUBCUTANEOUS at 11:58

## 2018-04-20 RX ADMIN — BUDESONIDE AND FORMOTEROL FUMARATE DIHYDRATE 2 PUFF(S): 160; 4.5 AEROSOL RESPIRATORY (INHALATION) at 05:19

## 2018-04-20 RX ADMIN — Medication 1 APPLICATION(S): at 11:32

## 2018-04-20 RX ADMIN — BUDESONIDE AND FORMOTEROL FUMARATE DIHYDRATE 2 PUFF(S): 160; 4.5 AEROSOL RESPIRATORY (INHALATION) at 18:05

## 2018-04-20 RX ADMIN — WARFARIN SODIUM 1 MILLIGRAM(S): 2.5 TABLET ORAL at 22:52

## 2018-04-20 NOTE — PROGRESS NOTE ADULT - ATTENDING COMMENTS
I agree with the above note and have personally seen and examined this patient. All pertinent films have been reviewed. Please refer to clinical documentation of the history, physical examinations, data summary, and both assessment and plan as documented above and with which I agree.    CT does not demonstrate drainable fluid collection or abscess. likely swelling on thigh is fat necrosis.   needs medicine/cards/renal clearance for R leg AKA  please transfuse to preop hct >30, will need 2-3 units  consider iron infusion to help or epo, consider consulting heme  local wound care per plastics  continue IV antibiotic treatment per infectious disease   encourage her to spend majority of bed oob in bedside chair as able  remains medically ill, need for continued hospitalization and monitoring  coumadin, inr goal 2-3  continue to optimize nutrition    I spoke with the patient and her  today about CT scan results. Still planning CT. Pending clearance and optimization. Needs preop transfusions. Will need to taper off coumadin onto lovenox versus heparin drip.  My partner, Dr. Olivares is also going to be speaking with her  and the patient further about the aka.     Nate Bass MD  Attending Orthopedic Surgeon

## 2018-04-20 NOTE — CHART NOTE - NSCHARTNOTEFT_GEN_A_CORE
follow up- pt with lund cath for urinary retention-pt is refusing to have trial of voiding secondary to her medical condition, impaired mobility, incontinence and resistant to remove the catheter now; pt is alert and oriented and understands  the risk of urinary tract infection and complication; d/w urology;  per discussion advised trial of voiding when patient is more mobile and stable;  Joya Wallace(NP)  [address], [phone] /[id number]

## 2018-04-20 NOTE — PROGRESS NOTE ADULT - SUBJECTIVE AND OBJECTIVE BOX
CHIEF COMPLAINT:    Interval Events:    REVIEW OF SYSTEMS:  Constitutional: No fevers or chills. No weight loss. No fatigue or generalized malaise.  Eyes: No itching or discharge from the eyes  ENT: No ear pain. No ear discharge. No nasal congestion. No post nasal drip. No epistaxis. No throat pain. No sore throat. No difficulty swallowing.   CV: No chest pain. No palpitations. No lightheadedness or dizziness.   Resp: No dyspnea at rest. No dyspnea on exertion. No orthopnea. No wheezing. No cough. No stridor. No sputum production. No chest pain with respiration.  GI: No nausea. No vomiting. No diarrhea.  MSK: No joint pain or pain in any extremities  Integumentary: No skin lesions. No pedal edema.  Neurological: No gross motor weakness. No sensory changes.  [ ] All other systems negative  [ ] Unable to assess ROS because ________    OBJECTIVE:  ICU Vital Signs Last 24 Hrs  T(C): 36.7 (20 Apr 2018 00:51), Max: 36.8 (19 Apr 2018 14:37)  T(F): 98.1 (20 Apr 2018 00:51), Max: 98.2 (19 Apr 2018 14:37)  HR: 89 (20 Apr 2018 00:51) (88 - 90)  BP: 125/73 (20 Apr 2018 00:51) (125/73 - 138/75)  BP(mean): --  ABP: --  ABP(mean): --  RR: 18 (20 Apr 2018 00:51) (18 - 20)  SpO2: 94% (20 Apr 2018 00:51) (94% - 97%)        04-18 @ 07:01 - 04-19 @ 07:00  --------------------------------------------------------  IN: 1065 mL / OUT: 175 mL / NET: 890 mL    04-19 @ 07:01 - 04-20 @ 04:49  --------------------------------------------------------  IN: 480 mL / OUT: 300 mL / NET: 180 mL      CAPILLARY BLOOD GLUCOSE          PHYSICAL EXAM:  General: Awake, alert, oriented X 3.   HEENT: Atraumatic, normocephalic.                 Mallampatti Grade                 No nasal congestion.                No tonsillar or pharyngeal exudates.  Lymph Nodes: No palpable lymphadenopathy  Neck: No JVD. No carotid bruit.   Respiratory: Normal chest expansion                         Normal percussion                         Normal and equal air entry                         No wheeze, rhonchi or rales.  Cardiovascular: S1 S2 normal. No murmurs, rubs or gallops.   Abdomen: Soft, non-tender, non-distended. No organomegaly.  Extremities: Warm to touch. Peripheral pulse palpable. No pedal edema.   Skin: No rashes or skin lesions  Neurological: Motor and sensory examination equal and normal in all four extremities.  Psychiatry: Appropriate mood and affect.    HOSPITAL MEDICATIONS:  MEDICATIONS  (STANDING):  ascorbic acid 500 milliGRAM(s) Oral daily  aspirin enteric coated 81 milliGRAM(s) Oral daily  buDESOnide 160 MICROgram(s)/formoterol 4.5 MICROgram(s) Inhaler 2 Puff(s) Inhalation two times a day  calcium carbonate 1250 mG + Vitamin D (OsCal 500 + D) 1 Tablet(s) Oral three times a day  DAPTOmycin IVPB 800 milliGRAM(s) IV Intermittent every 24 hours  docusate sodium 100 milliGRAM(s) Oral three times a day  ferrous    sulfate 325 milliGRAM(s) Oral daily  folic acid 1 milliGRAM(s) Oral daily  melatonin 3 milliGRAM(s) Oral at bedtime  multivitamin 1 Tablet(s) Oral daily  nystatin    Suspension 875138 Unit(s) Oral every 6 hours  pantoprazole    Tablet 40 milliGRAM(s) Oral before breakfast  polyethylene glycol 3350 17 Gram(s) Oral daily  povidone iodine 10% Solution 1 Application(s) Topical three times a day  silver sulfADIAZINE 1% Cream 1 Application(s) Topical daily  sodium chloride 0.9%. 1000 milliLiter(s) (75 mL/Hr) IV Continuous <Continuous>  tiotropium 18 MICROgram(s) Capsule 1 Capsule(s) Inhalation daily    MEDICATIONS  (PRN):  acetaminophen   Tablet 650 milliGRAM(s) Oral every 6 hours PRN For Temp greater than 38 C (100.4 F)  acetaminophen   Tablet. 650 milliGRAM(s) Oral every 6 hours PRN Mild Pain (1 - 3)  HYDROmorphone  Injectable 0.5 milliGRAM(s) IV Push every 4 hours PRN Severe Pain (7 - 10)  lidocaine 2% Viscous 10 milliLiter(s) Swish and Spit every 6 hours PRN Mouth Sores  oxyCODONE    IR 5 milliGRAM(s) Oral every 4 hours PRN Moderate Pain (4 - 6)  senna 2 Tablet(s) Oral at bedtime PRN Constipation      LABS:                        7.2    19.01 )-----------( 377      ( 19 Apr 2018 09:38 )             22.7     04-19    135  |  98  |  10  ----------------------------<  102<H>  3.9   |  25  |  0.65    Ca    8.0<L>      19 Apr 2018 07:33      PT/INR - ( 19 Apr 2018 09:38 )   PT: 32.7 sec;   INR: 2.83 ratio                   MICROBIOLOGY:     RADIOLOGY:  [ ] Reviewed and interpreted by me    Point of Care Ultrasound Findings:    PFT:    EKG: CHIEF COMPLAINT: no sob or cough--has pain upon wound dressing--concern over "amputation of leg?"    Interval Events: OOB    REVIEW OF SYSTEMS:  Constitutional: No fevers or chills. No weight loss. + fatigue or generalized malaise.  Eyes: No itching or discharge from the eyes  ENT: No ear pain. No ear discharge. No nasal congestion. No post nasal drip. No epistaxis. No throat pain. No sore throat. No difficulty swallowing.   CV: No chest pain. No palpitations. No lightheadedness or dizziness.   Resp: No dyspnea at rest. No dyspnea on exertion. No orthopnea. No wheezing. No cough. No stridor. No sputum production. No chest pain with respiration.  GI: No nausea. No vomiting. No diarrhea.  MSK: No joint pain or pain in any extremities--except RLE  Integumentary: No skin lesions. No pedal edema.  Neurological: No gross motor weakness. No sensory changes.  [+ ] All other systems negative  [ ] Unable to assess ROS because ________    OBJECTIVE:  ICU Vital Signs Last 24 Hrs  T(C): 36.7 (20 Apr 2018 00:51), Max: 36.8 (19 Apr 2018 14:37)  T(F): 98.1 (20 Apr 2018 00:51), Max: 98.2 (19 Apr 2018 14:37)  HR: 89 (20 Apr 2018 00:51) (88 - 90)  BP: 125/73 (20 Apr 2018 00:51) (125/73 - 138/75)  BP(mean): --  ABP: --  ABP(mean): --  RR: 18 (20 Apr 2018 00:51) (18 - 20)  SpO2: 94% (20 Apr 2018 00:51) (94% - 97%)        04-18 @ 07:01  -  04-19 @ 07:00  --------------------------------------------------------  IN: 1065 mL / OUT: 175 mL / NET: 890 mL    04-19 @ 07:01  -  04-20 @ 04:49  --------------------------------------------------------  IN: 480 mL / OUT: 300 mL / NET: 180 mL      CAPILLARY BLOOD GLUCOSE          PHYSICAL EXAM: NAD in bed  General: Awake, alert, oriented X 3.   HEENT: Atraumatic, normocephalic.                 Mallampatti Grade 3                No nasal congestion.                No tonsillar or pharyngeal exudates.  Lymph Nodes: No palpable lymphadenopathy  Neck: No JVD. No carotid bruit.   Respiratory: abnormal chest expansion-reduced BS bases                         Normal percussion                         Normal and equal air entry                         No wheeze, rhonchi or rales.  Cardiovascular: S1 S2 normal. No murmurs, rubs or gallops.   Abdomen: Soft, non-tender, non-distended. No organomegaly.  Extremities: Warm to touch. Peripheral pulse palpable. No pedal edema. RLE-wound wrapped  Skin: No rashes or skin lesions  Neurological: Motor and sensory examination equal and normal in all four extremities.  Psychiatry: Appropriate mood and affect.    HOSPITAL MEDICATIONS:  MEDICATIONS  (STANDING):  ascorbic acid 500 milliGRAM(s) Oral daily  aspirin enteric coated 81 milliGRAM(s) Oral daily  buDESOnide 160 MICROgram(s)/formoterol 4.5 MICROgram(s) Inhaler 2 Puff(s) Inhalation two times a day  calcium carbonate 1250 mG + Vitamin D (OsCal 500 + D) 1 Tablet(s) Oral three times a day  DAPTOmycin IVPB 800 milliGRAM(s) IV Intermittent every 24 hours  docusate sodium 100 milliGRAM(s) Oral three times a day  ferrous    sulfate 325 milliGRAM(s) Oral daily  folic acid 1 milliGRAM(s) Oral daily  melatonin 3 milliGRAM(s) Oral at bedtime  multivitamin 1 Tablet(s) Oral daily  nystatin    Suspension 765646 Unit(s) Oral every 6 hours  pantoprazole    Tablet 40 milliGRAM(s) Oral before breakfast  polyethylene glycol 3350 17 Gram(s) Oral daily  povidone iodine 10% Solution 1 Application(s) Topical three times a day  silver sulfADIAZINE 1% Cream 1 Application(s) Topical daily  sodium chloride 0.9%. 1000 milliLiter(s) (75 mL/Hr) IV Continuous <Continuous>  tiotropium 18 MICROgram(s) Capsule 1 Capsule(s) Inhalation daily    MEDICATIONS  (PRN):  acetaminophen   Tablet 650 milliGRAM(s) Oral every 6 hours PRN For Temp greater than 38 C (100.4 F)  acetaminophen   Tablet. 650 milliGRAM(s) Oral every 6 hours PRN Mild Pain (1 - 3)  HYDROmorphone  Injectable 0.5 milliGRAM(s) IV Push every 4 hours PRN Severe Pain (7 - 10)  lidocaine 2% Viscous 10 milliLiter(s) Swish and Spit every 6 hours PRN Mouth Sores  oxyCODONE    IR 5 milliGRAM(s) Oral every 4 hours PRN Moderate Pain (4 - 6)  senna 2 Tablet(s) Oral at bedtime PRN Constipation      LABS:                        7.2    19.01 )-----------( 377      ( 19 Apr 2018 09:38 )             22.7     04-19    135  |  98  |  10  ----------------------------<  102<H>  3.9   |  25  |  0.65    Ca    8.0<L>      19 Apr 2018 07:33      PT/INR - ( 19 Apr 2018 09:38 )   PT: 32.7 sec;   INR: 2.83 ratio                   MICROBIOLOGY:     RADIOLOGY:  [ ] Reviewed and interpreted by me    Point of Care Ultrasound Findings:    PFT:    EKG:

## 2018-04-20 NOTE — PROGRESS NOTE ADULT - PROBLEM SELECTOR PLAN 9
abnormal CT--c/w aspiration  Sp and Sw evaluation--aspiration precautions--ABX as per ID-total of 7 d overlap-completed -now on daptomycin alone

## 2018-04-20 NOTE — PROGRESS NOTE ADULT - ASSESSMENT
Patient with infected right tkr s/p rudy, spacer for strept infection, had poor wound healing so returned 3 months after and had spacer exchange and complex wound closure with mrsa infection found. She is about 2 weeks into dapto and returns with fever, leukocytosis, ongoing pain and eschar of right knee wound and what appears to be a hematoma of the right thigh. INR has been up so that is good reason for the thigh findings.  Consider infected hematoma possible. UTI is possible. Pneumonia on CT but assymptomatic  PMR decompensation or adrenal insufficiency a consideration at well.    Pulmonary stable relative to prior admits   CT abnormal--this suggests aspiration and not active PNA w/paucity of clinical signs and sx (ABX should cover her for both infections)    Concern over persistent fevers and lethargy-likely represents an untreated source of infection--ID following closely (?hematoma)--now afebrile for days as of 4/18 Patient with infected right tkr s/p rudy, spacer for strept infection, had poor wound healing so returned 3 months after and had spacer exchange and complex wound closure with mrsa infection found. She is about 2 weeks into dapto and returns with fever, leukocytosis, ongoing pain and eschar of right knee wound and what appears to be a hematoma of the right thigh. INR has been up so that is good reason for the thigh findings.  Consider infected hematoma possible. UTI is possible. Pneumonia on CT but assymptomatic  PMR decompensation or adrenal insufficiency a consideration at well.    Pulmonary stable relative to prior admits   CT abnormal--this suggests aspiration and not active PNA w/paucity of clinical signs and sx (ABX should cover her for both infections)    Concern over persistent fevers and lethargy-likely represents an untreated source of infection--ID following closely (?hematoma)--now afebrile for days as of 4/18    ortho. planning for R-AKA

## 2018-04-20 NOTE — CHART NOTE - NSCHARTNOTEFT_GEN_A_CORE
per ortho-Dr. Bass, pt needs clearance for possible rigt aKA next week-after  Dr. Olivares  speaking with her  and the patient further about the aka.  	Needs preop transfusions to keep hb around 10; will do daily cbc to determine-daily PRBC to keep hb; hold coumadin ; when INR is below 2, heparin drip.  ,cardiac clearance- called DR. [phone];  	renal clearance- called by attending md; . austen vick clearance- dr. dial is on board;   d/w Dr. dobbs;  Joya Wallace(NP)  3 [facility], [phone] /[phone] You can access the [facility] Patient Portal, offered by [facility], by registering with the following website: [email or web address]

## 2018-04-20 NOTE — CHART NOTE - NSCHARTNOTEFT_GEN_A_CORE
Spoke with NP of primary team to follow-up on change of surgical plans for the coming week 4/23/2018. Patient will NOT undergo an AKA procedure next week as the family does not want to proceed with such an operation at this time per discussions between Dr. Bass and the family. The primary team is to resume coumadin today and the patient is partial weight bearing. PT is necessary for this patient. A trapeze will be ordered for bed.

## 2018-04-20 NOTE — PROGRESS NOTE ADULT - SUBJECTIVE AND OBJECTIVE BOX
Pt seen/examined. Doing well. Pain controlled. No acute overnight complaints or events.    T(C): 36.5 (04-20-18 @ 05:15), Max: 36.8 (04-19-18 @ 14:37)  HR: 85 (04-20-18 @ 05:15) (85 - 90)  BP: 132/74 (04-20-18 @ 05:15) (125/73 - 132/74)  RR: 18 (04-20-18 @ 05:15) (18 - 20)  SpO2: 95% (04-20-18 @ 05:15) (94% - 95%)  Wt(kg): --    - Gen: NAD  - RLE: Dressing changed by PRS; grossly unchanged appearance of proximal thigh area of erythema and of knee; SILT TN/SPN/DPN/SN; TA/EHL/gs intact    77yFemale s/p revision R TKA w/PRS closure    - Pain control  - DVT ppx  - OOB/PT  - FU CT A/P/femur  - Family to discuss with Dr Bass regarding operative and non-operative management

## 2018-04-20 NOTE — PROGRESS NOTE ADULT - ASSESSMENT
Plan  - cont local wound care  - will cont to follow  - f/u ct results  - silvadene to right thigh/abd wound

## 2018-04-20 NOTE — PROGRESS NOTE ADULT - ASSESSMENT
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     # Fever, resolved  # Recent septic Rt TKR - abx spacer exchanged and plastics complex wound closure  # asp pna  # prox thigh swelling  # Supratherapeutic INR/coagulopathy- resolved  # PMR on chronic steroids    Plan:  remains afebrile  Completed Vanco and Aztreonam 7d course, cont Dapto per ID  BC NTD  transfuse 1 unit prbc for anemia, cont iron, goal >7.5  CT femur shows no focal drainable fluid collection, nonspecific subq edema in pannus and medial thigh  fu official CT abd  appreciate ortho recs, if plans for AKA, will need cardiac clearance  appreciate plastics/surgery recs, local wound care  Dose coumadin goal INR 2-3  pain control    plan of care dw patient and NP 78yo f PMH including HLD, GERD, Anxiety, Anemia, CAD s/p HERBERT, severe AS (s/p TAVR & PPM 12/17 [employer] Model [id number]), 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     # Fever, resolved  # Recent septic Rt TKR - abx spacer exchanged and plastics complex wound closure  # asp pna  # prox thigh swelling  # Supratherapeutic INR/coagulopathy- resolved  # PMR on chronic steroids    Plan:  remains afebrile  Completed Vanco and Aztreonam 7d course, cont Dapto per ID  BC NTD  transfuse 1 unit prbc for anemia, goal 9-10, cont iron, likely will need multiple prbc but will transfuse slowly  CT femur shows no focal drainable fluid collection, nonspecific subq edema in pannus and medial thigh  fu official CT abd  appreciate ortho recs, planning for AKA, will ask cardio to come back for preop eval  renal consult as requested by ortho  appreciate plastics/surgery recs, local wound care  will hold coumadin for anticipated procedure, start hep gtt if INR <2  will hold asa  pain control    plan of care dw patient and NP

## 2018-04-20 NOTE — PROGRESS NOTE ADULT - PROBLEM SELECTOR PLAN 4
multifactorial-leg and lung--on ABX as per ID (daptomycin)  need to reconsider other sources of infection-?endocarditis-if fevers persist

## 2018-04-20 NOTE — PROGRESS NOTE ADULT - SUBJECTIVE AND OBJECTIVE BOX
HPI:  I was called to see patient for preop cardiac evaluation prior to AKA, but per orthopedic surgery note, there is no longer a plan for AKA.    Review Of Systems:           Respiratory: No shortness of breath, cough, or wheezing  Cardiovascular: No chest pain or palpitations  10 point review of systems is otherwise negative except as mentioned above        Medications:  acetaminophen   Tablet 650 milliGRAM(s) Oral every 6 hours PRN  acetaminophen   Tablet. 650 milliGRAM(s) Oral every 6 hours PRN  ascorbic acid 500 milliGRAM(s) Oral daily  buDESOnide 160 MICROgram(s)/formoterol 4.5 MICROgram(s) Inhaler 2 Puff(s) Inhalation two times a day  calcium carbonate 1250 mG + Vitamin D (OsCal 500 + D) 1 Tablet(s) Oral three times a day  DAPTOmycin IVPB 800 milliGRAM(s) IV Intermittent every 24 hours  docusate sodium 100 milliGRAM(s) Oral three times a day  ferrous    sulfate 325 milliGRAM(s) Oral daily  folic acid 1 milliGRAM(s) Oral daily  HYDROmorphone  Injectable 0.5 milliGRAM(s) IV Push every 4 hours PRN  lidocaine 2% Viscous 10 milliLiter(s) Swish and Spit every 6 hours PRN  melatonin 3 milliGRAM(s) Oral at bedtime  multivitamin 1 Tablet(s) Oral daily  nystatin    Suspension 826855 Unit(s) Oral every 6 hours  oxyCODONE    IR 5 milliGRAM(s) Oral every 4 hours PRN  pantoprazole    Tablet 40 milliGRAM(s) Oral before breakfast  polyethylene glycol 3350 17 Gram(s) Oral daily  povidone iodine 10% Solution 1 Application(s) Topical three times a day  senna 2 Tablet(s) Oral at bedtime PRN  silver sulfADIAZINE 1% Cream 1 Application(s) Topical daily  sodium chloride 0.9%. 1000 milliLiter(s) IV Continuous <Continuous>  tiotropium 18 MICROgram(s) Capsule 1 Capsule(s) Inhalation daily    PAST MEDICAL & SURGICAL HISTORY:  CAD (coronary artery disease): HERBERT to LAD 2016  Aortic stenosis, severe  Uncomplicated asthma, unspecified asthma severity  Polymyalgia  Lumbar disc disease with radiculopathy  Spider veins  Anxiety  Severe aortic stenosis by prior echocardiogram:  and  2016  Dysphagia  Macular degeneration  Hiatal hernia  GERD (gastroesophageal reflux disease)  Sciatica  Osteoarthritis  S/P TKR (total knee replacement): joint infection s/p explant and abx spacer on 17  S/P TAVR (transcatheter aortic valve replacement): 17  Artificial cardiac pacemaker: 17  H/O cardiac catheterization: 16 HERBERT placed on LAD  H/O endoscopy: with dilatation of esophageal stricture   S/P cataract surgery: x2; 3-4yr ago  S/P gastroplasty: 28 yrs ago  S/P cholecystectomy: more than 15 years ago  S/P total knee replacement: left, 15yr ago  S/P total knee replacement: right, 12yr ago  S/P hysterectomy: 24yr ago    Vitals:  T(C): 36.6 (18 @ 15:25), Max: 37.1 (18 @ 14:17)  HR: 89 (18 @ 15:25) (85 - 90)  BP: 131/75 (18 @ 15:25) (125/73 - 133/74)  BP(mean): --  RR: 20 (18 @ 15:25) (18 - 20)  SpO2: 95% (18 @ 15:25) (94% - 98%)  Wt(kg): --  Daily     Daily Weight in k (2018 07:05)  I&O's Summary    2018 07:  -  2018 07:00  --------------------------------------------------------  IN: 600 mL / OUT: 300 mL / NET: 300 mL    2018 07:01  -  2018 19:17  --------------------------------------------------------  IN: 730 mL / OUT: 600 mL / NET: 130 mL        Physical Exam:  Appearance: Obese, NAD  Eyes: PERRLA, EOMI, pale conjunctiva, no scleral icterus   HENT: Normal oral mucosa  Cardiovascular: RRR, S1, S2, no murmur, rub, or gallop; no JVD  Procedural Access Site: Clean, dry, intact, without hematoma - right leg in brace  Respiratory: Clear to auscultation bilaterally anteriorly  Gastrointestinal: Soft, non-tender, non-distended, BS+  Musculoskeletal: No clubbing or joint deformity   Neurologic: No focal weakness  Lymphatic: No lymphadenopathy  Psychiatry: AAOx3 with appropriate mood and affect  Skin: No rashes, ecchymoses, or cyanosis                          6.9    16.44 )-----------( 350      ( 2018 07:57 )             22.0     04-20    135  |  99  |  8   ----------------------------<  98  3.7   |  26  |  0.65    Ca    8.2<L>      2018 06:37      PT/INR - ( 2018 08:07 )   PT: 30.3 sec;   INR: 2.63 ratio           CARDIAC MARKERS ( 2018 07:33 )  x     / x     / 14 U/L / x     / x            Cardiovascular Diagnostic Testing:    ECG: sinus tachycardia with RBBB, LAFB    Echo: < from: Transthoracic Echocardiogram (18 @ 14:10) >  ------------------------------------------------------------------------  Conclusions:  1. Mitral annular calcification. Mild mitral regurgitation.    2. Transcatheter aortic valve replacement. The valve is  well seated.  Peak transaortic valve gradient equals 36 mm  Hg, mean transaortic valve gradient equals 19 mm Hg, which  is probably normal in the presence of a transcatheter  aortic valve replacement. No valvular aortic valve  regurgitation and minimal paravalvular aortic  regurgitation.  3. Severely dilated left atrium.  LA volume index = 50  cc/m2.  4. Normal left ventricular systolic function. The basal  inferolateral wall, and the basal inferior wall are mildly  hypokinetic.  5. Moderate diastolic dysfunction (Stage II).  6. Right ventricular enlargement with normal right  ventricular systolic function. A device wire is noted in  the right heart.  7. Tricuspid valve not well visualized. Moderate tricuspid  regurgitation.  ------------------------------------------------------------------------  Confirmed on  2018 - 16:39:53 by Jonathan Pastor M.D.  ------------------------------------------------------------------------    < end of copied text >    Cath: 2016 - PCI to LAD and TAVR    Interpretation of Telemetry: patient not on tele

## 2018-04-20 NOTE — CHART NOTE - NSCHARTNOTEFT_GEN_A_CORE
informed by RN pt with low hb 6.9 from AM lab;  pt states she fees ok; In bed; uses oxygen for SOB with relief; no CP; no bleeding reported;   Patient is a 77y old  Female who presents with a chief complaint of fever (11 Apr 2018 14:25)    Vital Signs Last 24 Hrs  T(C): 36.6 (20 Apr 2018 10:00), Max: 36.8 (19 Apr 2018 14:37)  T(F): 97.8 (20 Apr 2018 10:00), Max: 98.2 (19 Apr 2018 14:37)  HR: 85 (20 Apr 2018 10:00) (85 - 90)  BP: 133/74 (20 Apr 2018 10:00) (125/73 - 133/74)  BP(mean): --  RR: 18 (20 Apr 2018 10:00) (18 - 20)  SpO2: 98% (20 Apr 2018 10:00) (94% - 98%)                        6.9    16.44 )-----------( 350      ( 20 Apr 2018 07:57 )             22.0     04-20    135  |  99  |  8   ----------------------------<  98  3.7   |  26  |  0.65    Ca    8.2<L>      20 Apr 2018 06:37      PT/INR - ( 20 Apr 2018 08:07 )   PT: 30.3 sec;   INR: 2.63 ratio      CT femur shows no focal drainable fluid collection, nonspecific subq edema in pannus and medial thigh  fu official CT abdomen    GENERAL: NAD, AAOx3, obese  HEAD:  Atraumatic, Normocephalic  EYES: EOMI, PERRLA, conjunctiva and sclera clear  NECK: Supple  CHEST/LUNG: CTABL, No wheeze  HEART: Regular rate and rhythm; + murmur  ABDOMEN: Soft, Nontender, Nondistended; Bowel sounds present, eschar over pannus, some erythema  : lund  EXTREMITIES:  2+ Peripheral Pulses, right knee incision with staples, crusting, in splint, right thigh dressing in place;  NEURO: No focal deficits    A/P  78yo f PMH including HLD, GERD, Anxiety, Anemia, CAD s/p HERBERT, severe AS (s/p TAVR & PPM 12/17 [employer] Model [id number]), [facility] , PMR on chronic steroids, asthma, OA, s/p TKR with recent joint infection s/p explant and abx spacer on 12/23/17, + rehab when had RLE DVT (dvt proph was held 2nd nose bleed) on Coumadin s/p 3/23/2018 Right knee explantation of previously placed articulating antibiotic spacer, irrigation and debridement of the knee, placement of static antibiotic spacer, with a Plastic Surgery soft tissue complex wound closure.   Fever, resolved;   Recent septic Rt TKR - abx spacer exchanged and plastics complex wound closure- follow up by ortho;/  asp pna/ dysphagia-  prox thigh swelling  Supratherapeutic INR/coagulopathy- resolved    Completed Vanco and Aztreonam 7d course, daptomycin through 5/5/18- ID follow up;  patient with low hemoglobin 6.9;  d/w attending MD;  transfuse 1 unit prbc for anemia, cont iron, goal >7.5; follow up cbc.  plan of care discussed with patient.  Joya Wallace(NP)  3 [facility], [phone] /[phone]

## 2018-04-20 NOTE — PROGRESS NOTE ADULT - ATTENDING COMMENTS
as above-  persistent fever and fatigue an issue--?unresolved source of temps (doubt lungs)  Pulm relatively stable-atelectasis, prior PE, asthma, cardiac Dz; abnormal CT-likely represents aspiration PNA  Inhaler regimen as outlined above; *******Sp and Sw evaluation--all meals in chair etc  DVT rx /prophylaxis  ID follow up of ABX (completed 7 day overlap then Daptomycin alone at rehab)---? Endocarditis--ERIKA--if fevers recur--ID follow up   PT evaluation and Psych evaluation                                  DC planning as no new interventions seem to be planned-wound care follow up    Everett Kumar MD-Pulmonary   [phone] as above-  persistent fever and fatigue an issue--?unresolved source of temps (doubt lungs)  Pulm relatively stable-atelectasis, prior PE, asthma, cardiac Dz; abnormal CT-likely represents aspiration PNA  Inhaler regimen as outlined above; *******Sp and Sw evaluation--all meals in chair etc  DVT rx /prophylaxis  ID follow up of ABX (completed 7 day overlap then Daptomycin alone at rehab)---? Endocarditis--ERIKA--if fevers recur--ID follow up   PT evaluation and Psych evaluation                                  Ortho to perform R-AKA over next week    Everett Kumar MD-Pulmonary   [phone]

## 2018-04-20 NOTE — PROGRESS NOTE ADULT - SUBJECTIVE AND OBJECTIVE BOX
Pt seen and examined. Doing well. No acute events o/n. Pending CT Rt thigh.    T(C): 36.6 (04-18-18 @ 04:56), Max: 37.3 (04-17-18 @ 16:44)  T(F): 97.9 (04-18-18 @ 04:56), Max: 99.2 (04-17-18 @ 16:44)  HR: 88 (04-18-18 @ 04:56) (88 - 100)  BP: 139/78 (04-18-18 @ 04:56) (128/69 - 143/76)  RR: 18 (04-18-18 @ 04:56) (18 - 18)  SpO2: 97% (04-18-18 @ 04:56) (95% - 97%)      17 Apr 2018 07:01  -  18 Apr 2018 07:00  --------------------------------------------------------  IN:    Oral Fluid: 580 mL  Total IN: 580 mL    OUT:    Indwelling Catheter - Urethral: 850 mL  Total OUT: 850 mL    Total NET: -270 mL          Exam:  Proximal thigh with ruptured bullae and some black eschar - betadine applied  Knee largely unchanged with stapled incision and large area of eschar - betadine applied                          7.2    16.83 )-----------( 401      ( 17 Apr 2018 07:37 )             22.7     04-17    135  |  98  |  11  ----------------------------<  102<H>  3.4<L>   |  28  |  0.65    Ca    8.6      17 Apr 2018 06:37        Plan:

## 2018-04-20 NOTE — PROGRESS NOTE ADULT - ASSESSMENT
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    She returned to rehab on IV Dapto, but readmitted with fever, leukocytosis and found to have multifocal pneumonia.   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    She returned to rehab on IV Dapto, but readmitted with fever, leukocytosis and found to have multifocal pneumonia.   Completed 7 days vanco and aztreonam  Dependent edema and thigh hematoma, no discreet collection on Sono previously, but now with seropurulent drainage- ?secondary infection  Afebrile past 24 hrs, Bld Cxs all negative  leukocytosis is moderating  CT: no drainable collections noted    Plan:  Continue Dapto monotherapy targeting MRSA   Follow temps and CBC/dif  ortho input noted and awaiting further decision re: possible AKA pending medical clearance

## 2018-04-21 LAB
ANION GAP SERPL CALC-SCNC: 13 MMOL/L — SIGNIFICANT CHANGE UP (ref 5–17)
BASOPHILS # BLD AUTO: 0.06 K/UL — SIGNIFICANT CHANGE UP (ref 0–0.2)
BASOPHILS NFR BLD AUTO: 0.4 % — SIGNIFICANT CHANGE UP (ref 0–2)
BLD GP AB SCN SERPL QL: POSITIVE — SIGNIFICANT CHANGE UP
BUN SERPL-MCNC: 7 MG/DL — SIGNIFICANT CHANGE UP (ref 7–23)
CALCIUM SERPL-MCNC: 8.2 MG/DL — LOW (ref 8.4–10.5)
CHLORIDE SERPL-SCNC: 97 MMOL/L — SIGNIFICANT CHANGE UP (ref 96–108)
CO2 SERPL-SCNC: 26 MMOL/L — SIGNIFICANT CHANGE UP (ref 22–31)
CREAT SERPL-MCNC: 0.61 MG/DL — SIGNIFICANT CHANGE UP (ref 0.5–1.3)
EOSINOPHIL # BLD AUTO: 0.3 K/UL — SIGNIFICANT CHANGE UP (ref 0–0.5)
EOSINOPHIL NFR BLD AUTO: 2 % — SIGNIFICANT CHANGE UP (ref 0–6)
GLUCOSE SERPL-MCNC: 103 MG/DL — HIGH (ref 70–99)
HCT VFR BLD CALC: 25.2 % — LOW (ref 34.5–45)
HGB BLD-MCNC: 8.1 G/DL — LOW (ref 11.5–15.5)
IMM GRANULOCYTES NFR BLD AUTO: 0.5 % — SIGNIFICANT CHANGE UP (ref 0–1.5)
INR BLD: 2.41 RATIO — HIGH (ref 0.88–1.16)
LYMPHOCYTES # BLD AUTO: 1.53 K/UL — SIGNIFICANT CHANGE UP (ref 1–3.3)
LYMPHOCYTES # BLD AUTO: 10.3 % — LOW (ref 13–44)
MCHC RBC-ENTMCNC: 27.6 PG — SIGNIFICANT CHANGE UP (ref 27–34)
MCHC RBC-ENTMCNC: 32.1 GM/DL — SIGNIFICANT CHANGE UP (ref 32–36)
MCV RBC AUTO: 86 FL — SIGNIFICANT CHANGE UP (ref 80–100)
MONOCYTES # BLD AUTO: 0.86 K/UL — SIGNIFICANT CHANGE UP (ref 0–0.9)
MONOCYTES NFR BLD AUTO: 5.8 % — SIGNIFICANT CHANGE UP (ref 2–14)
NEUTROPHILS # BLD AUTO: 12.02 K/UL — HIGH (ref 1.8–7.4)
NEUTROPHILS NFR BLD AUTO: 81 % — HIGH (ref 43–77)
PLATELET # BLD AUTO: 330 K/UL — SIGNIFICANT CHANGE UP (ref 150–400)
POTASSIUM SERPL-MCNC: 3.6 MMOL/L — SIGNIFICANT CHANGE UP (ref 3.5–5.3)
POTASSIUM SERPL-SCNC: 3.6 MMOL/L — SIGNIFICANT CHANGE UP (ref 3.5–5.3)
PROTHROM AB SERPL-ACNC: 27.7 SEC — HIGH (ref 10–13.1)
RBC # BLD: 2.93 M/UL — LOW (ref 3.8–5.2)
RBC # FLD: 17.5 % — HIGH (ref 10.3–14.5)
RH IG SCN BLD-IMP: POSITIVE — SIGNIFICANT CHANGE UP
SODIUM SERPL-SCNC: 136 MMOL/L — SIGNIFICANT CHANGE UP (ref 135–145)
WBC # BLD: 14.84 K/UL — HIGH (ref 3.8–10.5)
WBC # FLD AUTO: 14.84 K/UL — HIGH (ref 3.8–10.5)

## 2018-04-21 PROCEDURE — 93010 ELECTROCARDIOGRAM REPORT: CPT

## 2018-04-21 PROCEDURE — 99233 SBSQ HOSP IP/OBS HIGH 50: CPT | Mod: GC

## 2018-04-21 RX ORDER — WARFARIN SODIUM 2.5 MG/1
1 TABLET ORAL ONCE
Qty: 0 | Refills: 0 | Status: COMPLETED | OUTPATIENT
Start: 2018-04-21 | End: 2018-04-21

## 2018-04-21 RX ADMIN — BUDESONIDE AND FORMOTEROL FUMARATE DIHYDRATE 2 PUFF(S): 160; 4.5 AEROSOL RESPIRATORY (INHALATION) at 05:35

## 2018-04-21 RX ADMIN — Medication 500000 UNIT(S): at 21:43

## 2018-04-21 RX ADMIN — Medication 500000 UNIT(S): at 05:35

## 2018-04-21 RX ADMIN — WARFARIN SODIUM 1 MILLIGRAM(S): 2.5 TABLET ORAL at 21:44

## 2018-04-21 RX ADMIN — Medication 1 APPLICATION(S): at 05:36

## 2018-04-21 RX ADMIN — BUDESONIDE AND FORMOTEROL FUMARATE DIHYDRATE 2 PUFF(S): 160; 4.5 AEROSOL RESPIRATORY (INHALATION) at 18:06

## 2018-04-21 RX ADMIN — Medication 325 MILLIGRAM(S): at 13:22

## 2018-04-21 RX ADMIN — DAPTOMYCIN 132 MILLIGRAM(S): 500 INJECTION, POWDER, LYOPHILIZED, FOR SOLUTION INTRAVENOUS at 08:03

## 2018-04-21 RX ADMIN — HYDROMORPHONE HYDROCHLORIDE 0.5 MILLIGRAM(S): 2 INJECTION INTRAMUSCULAR; INTRAVENOUS; SUBCUTANEOUS at 05:40

## 2018-04-21 RX ADMIN — Medication 500000 UNIT(S): at 10:07

## 2018-04-21 RX ADMIN — Medication 1 TABLET(S): at 21:43

## 2018-04-21 RX ADMIN — HYDROMORPHONE HYDROCHLORIDE 0.5 MILLIGRAM(S): 2 INJECTION INTRAMUSCULAR; INTRAVENOUS; SUBCUTANEOUS at 00:00

## 2018-04-21 RX ADMIN — HYDROMORPHONE HYDROCHLORIDE 0.5 MILLIGRAM(S): 2 INJECTION INTRAMUSCULAR; INTRAVENOUS; SUBCUTANEOUS at 10:25

## 2018-04-21 RX ADMIN — HYDROMORPHONE HYDROCHLORIDE 0.5 MILLIGRAM(S): 2 INJECTION INTRAMUSCULAR; INTRAVENOUS; SUBCUTANEOUS at 05:16

## 2018-04-21 RX ADMIN — Medication 1 APPLICATION(S): at 13:23

## 2018-04-21 RX ADMIN — POLYETHYLENE GLYCOL 3350 17 GRAM(S): 17 POWDER, FOR SOLUTION ORAL at 13:22

## 2018-04-21 RX ADMIN — HYDROMORPHONE HYDROCHLORIDE 0.5 MILLIGRAM(S): 2 INJECTION INTRAMUSCULAR; INTRAVENOUS; SUBCUTANEOUS at 14:12

## 2018-04-21 RX ADMIN — Medication 1 APPLICATION(S): at 21:43

## 2018-04-21 RX ADMIN — Medication 500000 UNIT(S): at 18:05

## 2018-04-21 RX ADMIN — Medication 1 TABLET(S): at 13:22

## 2018-04-21 RX ADMIN — Medication 100 MILLIGRAM(S): at 13:22

## 2018-04-21 RX ADMIN — Medication 1 TABLET(S): at 05:36

## 2018-04-21 RX ADMIN — TIOTROPIUM BROMIDE 1 CAPSULE(S): 18 CAPSULE ORAL; RESPIRATORY (INHALATION) at 13:22

## 2018-04-21 RX ADMIN — HYDROMORPHONE HYDROCHLORIDE 0.5 MILLIGRAM(S): 2 INJECTION INTRAMUSCULAR; INTRAVENOUS; SUBCUTANEOUS at 14:30

## 2018-04-21 RX ADMIN — HYDROMORPHONE HYDROCHLORIDE 0.5 MILLIGRAM(S): 2 INJECTION INTRAMUSCULAR; INTRAVENOUS; SUBCUTANEOUS at 22:14

## 2018-04-21 RX ADMIN — Medication 1 APPLICATION(S): at 08:00

## 2018-04-21 RX ADMIN — PANTOPRAZOLE SODIUM 40 MILLIGRAM(S): 20 TABLET, DELAYED RELEASE ORAL at 05:37

## 2018-04-21 RX ADMIN — Medication 500 MILLIGRAM(S): at 13:22

## 2018-04-21 RX ADMIN — HYDROMORPHONE HYDROCHLORIDE 0.5 MILLIGRAM(S): 2 INJECTION INTRAMUSCULAR; INTRAVENOUS; SUBCUTANEOUS at 21:44

## 2018-04-21 RX ADMIN — Medication 3 MILLIGRAM(S): at 21:43

## 2018-04-21 RX ADMIN — HYDROMORPHONE HYDROCHLORIDE 0.5 MILLIGRAM(S): 2 INJECTION INTRAMUSCULAR; INTRAVENOUS; SUBCUTANEOUS at 10:07

## 2018-04-21 RX ADMIN — Medication 1 MILLIGRAM(S): at 13:22

## 2018-04-21 NOTE — CHART NOTE - NSCHARTNOTEFT_GEN_A_CORE
Notified by RN that patient was found saturated with urine. Urine was not draining in collection bag. Bladder not distended. Pt Denies suprapubic pain, vaginal pain, urethral pain. No bleeding present, no trauma  No plan for surgical intervention at this time,     Plan  Primary team and attending to determine necessity to reinsert Navarrete catheter.     Trena Faria NP  spectralink [phone]

## 2018-04-21 NOTE — CONSULT NOTE ADULT - ATTENDING COMMENTS
contact with question   cell [phone]  [facility]- [phone]

## 2018-04-21 NOTE — PROGRESS NOTE ADULT - SUBJECTIVE AND OBJECTIVE BOX
Pt seen and examined. Doing well. No acute events o/n. Dressing changed at bedside per RN.    T(C): 36.6 (04-18-18 @ 04:56), Max: 37.3 (04-17-18 @ 16:44)  T(F): 97.9 (04-18-18 @ 04:56), Max: 99.2 (04-17-18 @ 16:44)  HR: 88 (04-18-18 @ 04:56) (88 - 100)  BP: 139/78 (04-18-18 @ 04:56) (128/69 - 143/76)  RR: 18 (04-18-18 @ 04:56) (18 - 18)  SpO2: 97% (04-18-18 @ 04:56) (95% - 97%)      17 Apr 2018 07:01  -  18 Apr 2018 07:00  --------------------------------------------------------  IN:    Oral Fluid: 580 mL  Total IN: 580 mL    OUT:    Indwelling Catheter - Urethral: 850 mL  Total OUT: 850 mL    Total NET: -270 mL          Exam:  Proximal thigh with ruptured bullae and some black eschar - betadine applied  Knee largely unchanged with stapled incision and large area of eschar - betadine applied; eschar beginning to unroof.                          7.2    16.83 )-----------( 401      ( 17 Apr 2018 07:37 )             22.7     04-17    135  |  98  |  11  ----------------------------<  102<H>  3.4<L>   |  28  |  0.65    Ca    8.6      17 Apr 2018 06:37        Plan:

## 2018-04-21 NOTE — PROGRESS NOTE ADULT - ASSESSMENT
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     # Fever, resolved  # Recent septic Rt TKR - abx spacer exchanged and plastics complex wound closure  # asp pna  # prox thigh swelling  # Supratherapeutic INR/coagulopathy- resolved  # PMR on chronic steroids    Plan:  remains afebrile, cont Dapto per ID, BC NTD  sp 1 unit prbc yesterday  CT femur shows no focal drainable fluid collection, nonspecific subq edema in pannus and medial thigh  appreciate ortho recs  family and patient do not want AKA  appreciate plastics/surgery recs, local wound care  will cont coumadin as no plans for AKA  cont Asa  pain control    plan of care dw patient and NP 76yo f PMH including HLD, GERD, Anxiety, Anemia, CAD s/p HERBERT, severe AS (s/p TAVR & PPM 12/17 [employer] Model [id number]), 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    # Fever, resolved  # Recent septic Rt TKR - abx spacer exchanged and plastics complex wound closure  # Aspiration PNA  # prox thigh swelling  # Supratherapeutic INR/coagulopathy, resolved  # PMR on chronic steroids    Plan:  remains afebrile, cont Dapto per ID, BC NTD  sp 1 unit prbc yesterday, AM CBC pending  CT femur shows no focal drainable fluid collection, nonspecific subq edema in pannus and medial thigh  appreciate ortho recs, family mtg today  appreciate plastics/surgery recs, local wound care  will cont coumadin today, to hold if plans for AKA, will hold coumadin and asa  appreciate cardio  recs  pain control    plan of care dw patient, aid and NP

## 2018-04-21 NOTE — CONSULT NOTE ADULT - SUBJECTIVE AND OBJECTIVE BOX
[facility] - Anna / Basil RAMIREZ /Frances/ JAMES Zamudio/ JAMES Irizarry/ Natalio Hays / BOB Njeru  -------------------------------------------------------------------------------------------------------  The patient seen and examined today.  HPI:  77 year old female with hx of Gastroesophageal reflux disease stable, aniexty, coronary artery disease s/p stent, s/p TAVR and PPM , hx of mvr with recent hx of acute kidney injury Serum Creatinine 1.9 high improved to normal at baseline  hx of osteoarthritis with knee replacement on right sided s/p joint infection here for further orthopedic procedure  today denies chest pain, shortness of breath,   no dysuria, no abd pain  on antibiotics given in ER    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  Artificial cardiac pacemaker: 12/25/17  H/O cardiac catheterization: 11/29/16 HERBERT placed on LAD  H/O endoscopy: with dilatation of esophageal stricture 2013  S/P cataract surgery: x2; 3-4yr ago  S/P gastroplasty: 28 yrs ago  S/P cholecystectomy: more than 15 years ago  S/P total knee replacement: left, 15yr ago  S/P total knee replacement: right, 12yr ago  S/P hysterectomy: 24yr ago    Allergies :- amoxicillin (Other)  IV Contrast (Flushing (Skin); Nausea)    Home Medications Reviewed  Hospital Medications:   MEDICATIONS  (STANDING):  ascorbic acid 500 milliGRAM(s) Oral daily  buDESOnide 160 MICROgram(s)/formoterol 4.5 MICROgram(s) Inhaler 2 Puff(s) Inhalation two times a day  calcium carbonate 1250 mG + Vitamin D (OsCal 500 + D) 1 Tablet(s) Oral three times a day  DAPTOmycin IVPB 800 milliGRAM(s) IV Intermittent every 24 hours  docusate sodium 100 milliGRAM(s) Oral three times a day  ferrous    sulfate 325 milliGRAM(s) Oral daily  folic acid 1 milliGRAM(s) Oral daily  melatonin 3 milliGRAM(s) Oral at bedtime  multivitamin 1 Tablet(s) Oral daily  nystatin    Suspension 272895 Unit(s) Oral every 6 hours  pantoprazole    Tablet 40 milliGRAM(s) Oral before breakfast  polyethylene glycol 3350 17 Gram(s) Oral daily  povidone iodine 10% Solution 1 Application(s) Topical three times a day  silver sulfADIAZINE 1% Cream 1 Application(s) Topical daily  sodium chloride 0.9%. 1000 milliLiter(s) (75 mL/Hr) IV Continuous <Continuous>  tiotropium 18 MICROgram(s) Capsule 1 Capsule(s) Inhalation daily    SOCIAL HISTORY:  Denies ETOh,Smoking,   FAMILY HISTORY:  No pertinent family history in first degree relatives    REVIEW OF SYSTEMS:  CONSTITUTIONAL: + weakness, + fevers   EYES/ENT: No visual changes;    NECK: No pain or stiffness  RESPIRATORY: No cough, wheezing, hemoptysis; No shortness of breath  CARDIOVASCULAR: No chest pain or palpitations.  GASTROINTESTINAL: No abdominal or epigastric pain. No nausea, vomiting, or hematemesis; No diarrhea or constipation. No melena or hematochezia.  GENITOURINARY: No dysuria, frequency, foamy urine, urinary urgency, incontinence or hematuria  NEUROLOGICAL: No numbness or weakness  SKIN: No itching, burning, rashes, or lesions   VASCULAR: No bilateral lower extremity edema.   All other review of systems is negative unless indicated above.    VITALS:  T(F): 97.9 (04-21-18 @ 05:12), Max: 99 (04-20-18 @ 19:15)  HR: 85 (04-21-18 @ 05:12)  BP: 151/77 (04-21-18 @ 05:12)  RR: 18 (04-21-18 @ 05:12)  SpO2: 94% (04-21-18 @ 05:12)    04-20 @ 07:01  -  04-21 @ 07:00  --------------------------------------------------------  IN: 850 mL / OUT: 775 mL / NET: 75 mL    04-21 @ 07:01  -  04-21 @ 09:41  --------------------------------------------------------  IN: 50 mL / OUT: 0 mL / NET: 50 mL    PHYSICAL EXAM:  Constitutional: NAD  Neck: supple.   Respiratory: Bilateral equal breath sounds , no crackles  Cardiovascular: S1, S2, Regular, Murmur present.  Gastrointestinal: Bowel Sound present, soft, NT/ND  Extremities:  No peripheral edema, right knee joint in dressing   Neurological: Alert and oriented x 3  Psychiatric: Normal mood, normal affect    Data:  04-21    136  |  97  |  7   ----------------------------<  103<H>  3.6   |  26  |  0.61    Ca    8.2<L>      21 Apr 2018 07:06      Creatinine Trend: 0.61 <--, 0.65 <--, 0.65 <--, 0.71 <--, 0.65 <--, 0.58 <--                        8.1    14.84 )-----------( 330      ( 21 Apr 2018 09:13 )             25.2     Urine Studies:

## 2018-04-21 NOTE — PROGRESS NOTE ADULT - ASSESSMENT
90 minute family meeting held today with patient, , sons, grandchildren present and one grandchild on speaker phone. We discussed Mrs. Rdz's entire course from the initial PJI presentation in December through present day. We discussed various outcomes and treatment plans which included continued local wound care and medical support with the goal of eventual healing of her wound and the option for above knee amputation. The CT does not show a drainable fluid collection or any untreated abscess. We discussed that none of these options unfortunately restores her ability to ambulate in a meaninful way. Currently she is PWB (50%) but she has refused up to this point to try this. With an AKA she will be unable to bear weight and likely will not be able to be fitted for a prosthesis and the patient would not want a prosthesis anyway. There is no guarantee that either treatment methods completely erradicates the infection. The family understands that the continued infection does impair her overall health and there is a potential for the patient dying from this illness. They understand how the current treatment plan per plastics is for local wound care and for healing of the various wounds via secondary intention healing. Right now her WBC has downtrended this week and apart from a single febrile episode during which the patient was crying (witnessed) she has been afebrile for 1 week. The family opts to continue with local wound care with no plan for reimplantation of a TKA in the future or implantation of a knee fusion device. They understand that a future surgery, if even possible, can result in wound necrosis again or reinfection. There is also no guarantee that the infection will not come back or worsen. We will continue to monitor over the next week and if she continues to make progress then plan for transfer to a subacute rehabilitation facility. We also discussed the option for discharge direct to home at the husbands request but he now understands that he will lack direct medical support if they opt for this as he originally thought the doctors could all come to his home for consultations.     no plan for surgical intervention  please continue to transfuse to hct >30 for cardiac protection  local wound care per plastics  continue IV antibiotic treatment per infectious disease   encourage her to spend majority of bed oob in bedside chair as able  orthopedic trapeze to be connected (ordered) to facilitate the patient mobilizing	  involve PT to help mobilize, the patient states that she is now willing to try, she must remain 50% wb on the RLE and NO KNEE MOTION allowed, knee immobilizer if oob  coumadin, inr goal 2-3  continue to optimize nutrition  continue to involve psych    Nate Bass MD  Attending Orthopedic Surgeon 90 minute family meeting held today with patient, , sons, grandchildren present and one grandchild on speaker phone. We discussed Mrs. Rdz's entire course from the initial PJI presentation in December through present day. We discussed various outcomes and treatment plans which included continued local wound care and medical support with the goal of eventual healing of her wound and the option for above knee amputation. The CT does not show a drainable fluid collection or any untreated abscess. We discussed that none of these options unfortunately restores her ability to ambulate in a meaninful way. Currently she is PWB (50%) but she has refused up to this point to try this. With an AKA she will be unable to bear weight and likely will not be able to be fitted for a prosthesis and the patient would not want a prosthesis anyway. There is no guarantee that either treatment methods completely erradicates the infection. The family understands that the continued infection does impair her overall health and there is a potential for the patient dying from this illness. They understand how the current treatment plan per plastics is for local wound care and for healing of the various wounds via secondary intention healing. Right now her WBC has downtrended this week and apart from a single febrile episode during which the patient was crying (witnessed) she has been afebrile for 1 week. The family opts to continue with local wound care with no plan for reimplantation of a TKA in the future or implantation of a knee fusion device. They understand that a future surgery, if even possible, can result in wound necrosis again or reinfection. There is also no guarantee that the infection will not come back or worsen. We will continue to monitor over the next week and if she continues to make progress then plan for transfer to a subacute rehabilitation facility. We also discussed the option for discharge direct to home at the husbands request but he now understands that he will lack direct medical support if they opt for this as he originally thought the doctors could all come to his home for consultations.     continue daily labs and trend wbc, PLEASE SEND ESR/CRP with Sunday's AM labs	  no plan for surgical intervention  please continue to transfuse to hct >30 for cardiac protection  local wound care per plastics  continue IV antibiotic treatment per infectious disease   encourage her to spend majority of bed oob in bedside chair as able  orthopedic trapeze to be connected (ordered) to facilitate the patient mobilizing	  involve PT to help mobilize, the patient states that she is now willing to try, she must remain 50% wb on the RLE and NO KNEE MOTION allowed, knee immobilizer if oob  coumadin, inr goal 2-3  continue to optimize nutrition  continue to involve psych    Nate Bass MD  Attending Orthopedic Surgeon

## 2018-04-21 NOTE — PROGRESS NOTE ADULT - SUBJECTIVE AND OBJECTIVE BOX
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    Patient remains weak, no fevers, c/o thigh pain-unchanged    REVIEW OF SYSTEMS:  All other review of systems negative (Comprehensive ROS)    Antimicrobials Day #28 total  DAPTOmycin IVPB 800 milliGRAM(s) IV Intermittent every 24 hours  nystatin    Suspension 711875 Unit(s) Oral every 6 hours      Other Medications Reviewed    Vital Signs Last 24 Hrs  T(C): 36.6 (21 Apr 2018 05:12), Max: 37.2 (20 Apr 2018 19:15)  T(F): 97.9 (21 Apr 2018 05:12), Max: 99 (20 Apr 2018 19:15)  HR: 85 (21 Apr 2018 05:12) (85 - 89)  BP: 151/77 (21 Apr 2018 05:12) (129/73 - 151/77)  BP(mean): --  RR: 18 (21 Apr 2018 05:12) (18 - 20)  SpO2: 94% (21 Apr 2018 05:12) (94% - 98%)    PHYSICAL EXAM:  General: alert, no acute distress  Eyes:  anicteric, no conjunctival injection, no discharge  Oropharynx: no lesions or injection 	  Neck: supple, without adenopathy  Lungs:  diminished at bases  Heart: regular rate and rhythm; no murmur, rubs or gallops  Abdomen: soft, nondistended, nontender, without mass or organomegaly  [place]te  Skin: no lesions  Extremities: dependent LE edema, R upper thigh induration, ecchymosis, and ulceration persists, now with active seropurulent exudate expressed.  R knee incision intact and dry, large medial eschar, black necrosis without change.  R PICC site clean  Neurologic: alert, moves all extremities    LAB RESULTS:                                              6.9    16.44 )-----------( 350      ( 20 Apr 2018 07:57 )             22.0   04-21    136  |  97  |  7   ----------------------------<  103<H>  3.6   |  26  |  0.61    Ca    8.2<L>      21 Apr 2018 07:06                MICROBIOLOGY:  RECENT CULTURES REVIEWED    RADIOLOGY REVIEWED

## 2018-04-21 NOTE — PROGRESS NOTE ADULT - SUBJECTIVE AND OBJECTIVE BOX
pain controlled, awake and alert    RLE  thigh swelling continues to improve and soften, no erythema, eschar stable, no further exudate, dressed already   surgical wound intact with stable eschar, some separation of eschar medially from skin, no further exudate, dressing by plastics  no surrounding erythema around wound, minimal swelling  5/5 ta/ehl/gcs  silt l4-s1  2+ dp pulse

## 2018-04-21 NOTE — PROGRESS NOTE ADULT - SUBJECTIVE AND OBJECTIVE BOX
Patient is a 77y old  Female who presents with a chief complaint of fever (11 Apr 2018 14:25)      SUBJECTIVE / OVERNIGHT EVENTS:    Patient seen and examined.       Vital Signs Last 24 Hrs  T(C): 36.6 (21 Apr 2018 05:12), Max: 37.2 (20 Apr 2018 19:15)  T(F): 97.9 (21 Apr 2018 05:12), Max: 99 (20 Apr 2018 19:15)  HR: 85 (21 Apr 2018 05:12) (85 - 89)  BP: 151/77 (21 Apr 2018 05:12) (129/73 - 151/77)  BP(mean): --  RR: 18 (21 Apr 2018 05:12) (18 - 20)  SpO2: 94% (21 Apr 2018 05:12) (94% - 98%)  I&O's Summary    20 Apr 2018 07:01  -  21 Apr 2018 07:00  --------------------------------------------------------  IN: 730 mL / OUT: 775 mL / NET: -45 mL    21 Apr 2018 07:01  -  21 Apr 2018 08:13  --------------------------------------------------------  IN: 50 mL / OUT: 0 mL / NET: 50 mL        PE:  GENERAL: NAD, AAOx3, obese  HEAD:  Atraumatic, Normocephalic  EYES: EOMI, PERRLA, conjunctiva and sclera clear  NECK: Supple  CHEST/LUNG: CTABL, No wheeze  HEART: Regular rate and rhythm; + murmur  ABDOMEN: Soft, Nontender, Nondistended; Bowel sounds present, eschar over pannus, some erythema  : lund  EXTREMITIES:  2+ Peripheral Pulses, right knee incision with staples, crusting, in splint, right thigh bullae with serosanguinous liquid output  NEURO: No focal deficits    LABS:                        6.9    16.44 )-----------( 350      ( 20 Apr 2018 07:57 )             22.0     04-21    136  |  97  |  7   ----------------------------<  103<H>  3.6   |  26  |  0.61    Ca    8.2<L>      21 Apr 2018 07:06      PT/INR - ( 20 Apr 2018 08:07 )   PT: 30.3 sec;   INR: 2.63 ratio           CAPILLARY BLOOD GLUCOSE                RADIOLOGY & ADDITIONAL TESTS:    Imaging Personally Reviewed:  [x] YES  [ ] NO    Consultant(s) Notes Reviewed:  [x] YES  [ ] NO    MEDICATIONS  (STANDING):  ascorbic acid 500 milliGRAM(s) Oral daily  buDESOnide 160 MICROgram(s)/formoterol 4.5 MICROgram(s) Inhaler 2 Puff(s) Inhalation two times a day  calcium carbonate 1250 mG + Vitamin D (OsCal 500 + D) 1 Tablet(s) Oral three times a day  DAPTOmycin IVPB 800 milliGRAM(s) IV Intermittent every 24 hours  docusate sodium 100 milliGRAM(s) Oral three times a day  ferrous    sulfate 325 milliGRAM(s) Oral daily  folic acid 1 milliGRAM(s) Oral daily  melatonin 3 milliGRAM(s) Oral at bedtime  multivitamin 1 Tablet(s) Oral daily  nystatin    Suspension 164981 Unit(s) Oral every 6 hours  pantoprazole    Tablet 40 milliGRAM(s) Oral before breakfast  polyethylene glycol 3350 17 Gram(s) Oral daily  povidone iodine 10% Solution 1 Application(s) Topical three times a day  silver sulfADIAZINE 1% Cream 1 Application(s) Topical daily  sodium chloride 0.9%. 1000 milliLiter(s) (75 mL/Hr) IV Continuous <Continuous>  tiotropium 18 MICROgram(s) Capsule 1 Capsule(s) Inhalation daily    MEDICATIONS  (PRN):  acetaminophen   Tablet 650 milliGRAM(s) Oral every 6 hours PRN For Temp greater than 38 C (100.4 F)  acetaminophen   Tablet. 650 milliGRAM(s) Oral every 6 hours PRN Mild Pain (1 - 3)  HYDROmorphone  Injectable 0.5 milliGRAM(s) IV Push every 4 hours PRN Severe Pain (7 - 10)  lidocaine 2% Viscous 10 milliLiter(s) Swish and Spit every 6 hours PRN Mouth Sores  oxyCODONE    IR 5 milliGRAM(s) Oral every 4 hours PRN Moderate Pain (4 - 6)  senna 2 Tablet(s) Oral at bedtime PRN Constipation      Care Discussed with Consultants/Other Providers [x] YES  [ ] NO    HEALTH ISSUES - PROBLEM Dx:  Adjustment disorder, unspecified type  Delirium due to another medical condition  Pneumonia: Pneumonia  Aortic stenosis, severe: Aortic stenosis, severe  MYAH (obstructive sleep apnea): MYAH (obstructive sleep apnea)  Obesity: Obesity  Asthma: Asthma  SOB (shortness of breath): SOB (shortness of breath)  Chronic deep vein thrombosis (DVT) of lower extremity, unspecified laterality, unspecified vein: Chronic deep vein thrombosis (DVT) of lower extremity, unspecified laterality, unspecified vein  Arthralgia of knee, unspecified laterality: Arthralgia of knee, unspecified laterality  Hyperlipidemia, unspecified hyperlipidemia type: Hyperlipidemia, unspecified hyperlipidemia type  Asthma, unspecified asthma severity, unspecified whether complicated, unspecified whether persistent: Asthma, unspecified asthma severity, unspecified whether complicated, unspecified whether persistent  Coronary artery disease involving native coronary artery of native heart without angina pectoris: Coronary artery disease involving native coronary artery of native heart without angina pectoris  Gastroesophageal reflux disease, esophagitis presence not specified: Gastroesophageal reflux disease, esophagitis presence not specified  Fever, unspecified fever cause: Fever, unspecified fever cause Patient is a 77y old  Female who presents with a chief complaint of fever (11 Apr 2018 14:25)      SUBJECTIVE / OVERNIGHT EVENTS:    Patient seen and examined. denies complaints. family mtg with Dr. Bass today at 1pm per patient and aid at bedside. they will discuss AKA.      Vital Signs Last 24 Hrs  T(C): 36.6 (21 Apr 2018 05:12), Max: 37.2 (20 Apr 2018 19:15)  T(F): 97.9 (21 Apr 2018 05:12), Max: 99 (20 Apr 2018 19:15)  HR: 85 (21 Apr 2018 05:12) (85 - 89)  BP: 151/77 (21 Apr 2018 05:12) (129/73 - 151/77)  BP(mean): --  RR: 18 (21 Apr 2018 05:12) (18 - 20)  SpO2: 94% (21 Apr 2018 05:12) (94% - 98%)  I&O's Summary    20 Apr 2018 07:01  -  21 Apr 2018 07:00  --------------------------------------------------------  IN: 730 mL / OUT: 775 mL / NET: -45 mL    21 Apr 2018 07:01  -  21 Apr 2018 08:13  --------------------------------------------------------  IN: 50 mL / OUT: 0 mL / NET: 50 mL        PE:  GENERAL: NAD, AAOx3, obese  HEAD:  Atraumatic, Normocephalic  EYES: EOMI, PERRLA, conjunctiva and sclera clear  NECK: Supple  CHEST/LUNG: CTABL, No wheeze  HEART: Regular rate and rhythm; + murmur  ABDOMEN: Soft, Nontender, Nondistended; Bowel sounds present, eschar over pannus, some erythema  : lund  EXTREMITIES:  2+ Peripheral Pulses, right knee incision with staples, crusting, right thigh bullae with serosanguinous liquid output  NEURO: No focal deficits    LABS:                        6.9    16.44 )-----------( 350      ( 20 Apr 2018 07:57 )             22.0     04-21    136  |  97  |  7   ----------------------------<  103<H>  3.6   |  26  |  0.61    Ca    8.2<L>      21 Apr 2018 07:06      PT/INR - ( 20 Apr 2018 08:07 )   PT: 30.3 sec;   INR: 2.63 ratio           CAPILLARY BLOOD GLUCOSE                RADIOLOGY & ADDITIONAL TESTS:    Imaging Personally Reviewed:  [x] YES  [ ] NO    Consultant(s) Notes Reviewed:  [x] YES  [ ] NO    MEDICATIONS  (STANDING):  ascorbic acid 500 milliGRAM(s) Oral daily  buDESOnide 160 MICROgram(s)/formoterol 4.5 MICROgram(s) Inhaler 2 Puff(s) Inhalation two times a day  calcium carbonate 1250 mG + Vitamin D (OsCal 500 + D) 1 Tablet(s) Oral three times a day  DAPTOmycin IVPB 800 milliGRAM(s) IV Intermittent every 24 hours  docusate sodium 100 milliGRAM(s) Oral three times a day  ferrous    sulfate 325 milliGRAM(s) Oral daily  folic acid 1 milliGRAM(s) Oral daily  melatonin 3 milliGRAM(s) Oral at bedtime  multivitamin 1 Tablet(s) Oral daily  nystatin    Suspension 712212 Unit(s) Oral every 6 hours  pantoprazole    Tablet 40 milliGRAM(s) Oral before breakfast  polyethylene glycol 3350 17 Gram(s) Oral daily  povidone iodine 10% Solution 1 Application(s) Topical three times a day  silver sulfADIAZINE 1% Cream 1 Application(s) Topical daily  sodium chloride 0.9%. 1000 milliLiter(s) (75 mL/Hr) IV Continuous <Continuous>  tiotropium 18 MICROgram(s) Capsule 1 Capsule(s) Inhalation daily    MEDICATIONS  (PRN):  acetaminophen   Tablet 650 milliGRAM(s) Oral every 6 hours PRN For Temp greater than 38 C (100.4 F)  acetaminophen   Tablet. 650 milliGRAM(s) Oral every 6 hours PRN Mild Pain (1 - 3)  HYDROmorphone  Injectable 0.5 milliGRAM(s) IV Push every 4 hours PRN Severe Pain (7 - 10)  lidocaine 2% Viscous 10 milliLiter(s) Swish and Spit every 6 hours PRN Mouth Sores  oxyCODONE    IR 5 milliGRAM(s) Oral every 4 hours PRN Moderate Pain (4 - 6)  senna 2 Tablet(s) Oral at bedtime PRN Constipation      Care Discussed with Consultants/Other Providers [x] YES  [ ] NO    HEALTH ISSUES - PROBLEM Dx:  Adjustment disorder, unspecified type  Delirium due to another medical condition  Pneumonia: Pneumonia  Aortic stenosis, severe: Aortic stenosis, severe  MYAH (obstructive sleep apnea): MYAH (obstructive sleep apnea)  Obesity: Obesity  Asthma: Asthma  SOB (shortness of breath): SOB (shortness of breath)  Chronic deep vein thrombosis (DVT) of lower extremity, unspecified laterality, unspecified vein: Chronic deep vein thrombosis (DVT) of lower extremity, unspecified laterality, unspecified vein  Arthralgia of knee, unspecified laterality: Arthralgia of knee, unspecified laterality  Hyperlipidemia, unspecified hyperlipidemia type: Hyperlipidemia, unspecified hyperlipidemia type  Asthma, unspecified asthma severity, unspecified whether complicated, unspecified whether persistent: Asthma, unspecified asthma severity, unspecified whether complicated, unspecified whether persistent  Coronary artery disease involving native coronary artery of native heart without angina pectoris: Coronary artery disease involving native coronary artery of native heart without angina pectoris  Gastroesophageal reflux disease, esophagitis presence not specified: Gastroesophageal reflux disease, esophagitis presence not specified  Fever, unspecified fever cause: Fever, unspecified fever cause

## 2018-04-21 NOTE — PROGRESS NOTE ADULT - ASSESSMENT
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    She returned to rehab on IV Dapto, but readmitted with fever, leukocytosis and found to have multifocal pneumonia.   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    She returned to rehab on IV Dapto, but readmitted with fever, leukocytosis and found to have multifocal pneumonia.   Completed 7 days vanco and aztreonam  Dependent edema and thigh hematoma, no discreet collection on Sono previously, but now with seropurulent drainage- ?secondary infection  Afebrile past 24 hrs, Bld Cxs all negative  leukocytosis is moderating  CT: no drainable collections noted    Plan:  Continue Dapto monotherapy targeting MRSA   Follow temps and CBC/dif  ortho/palstics input noted and awaiting further decision re: possible AKA pending medical clearance and family decision

## 2018-04-21 NOTE — PROGRESS NOTE ADULT - SUBJECTIVE AND OBJECTIVE BOX
CHIEF COMPLAINT:    Interval Events:    REVIEW OF SYSTEMS:  Constitutional: No fevers or chills. No weight loss. No fatigue or generalised malaise.  Eyes: No itching or discharge from the eyes  ENT: No ear pain. No ear discharge. No nasal congestion. No post nasal drip. No epistaxis. No throat pain. No sore throat. No difficulty swallowing.   CV: No chest pain. No palpitations. No lightheadedness or dizziness.   Resp: No dyspnea at rest. No dyspnea on exertion. No orthopnea. No wheezing. No cough. No stridor. No sputum production. No chest pain with respiration.  GI: No nausea. No vomiting. No diarrhea.  MSK: No joint pain or pain in any extremities  Integumentary: No skin lesions. No pedal edema.  Neurological: No gross motor weakness. No sensory changes.  [ ] All other systems negative  [ ] Unable to assess ROS because ________    OBJECTIVE:  ICU Vital Signs Last 24 Hrs  T(C): 36.6 (21 Apr 2018 05:12), Max: 37.2 (20 Apr 2018 19:15)  T(F): 97.9 (21 Apr 2018 05:12), Max: 99 (20 Apr 2018 19:15)  HR: 85 (21 Apr 2018 05:12) (85 - 89)  BP: 151/77 (21 Apr 2018 05:12) (129/73 - 151/77)  BP(mean): --  ABP: --  ABP(mean): --  RR: 18 (21 Apr 2018 05:12) (18 - 20)  SpO2: 94% (21 Apr 2018 05:12) (94% - 98%)        04-20 @ 07:01 - 04-21 @ 07:00  --------------------------------------------------------  IN: 850 mL / OUT: 775 mL / NET: 75 mL    04-21 @ 07:01 - 04-21 @ 09:28  --------------------------------------------------------  IN: 50 mL / OUT: 0 mL / NET: 50 mL      CAPILLARY BLOOD GLUCOSE          PHYSICAL EXAM:  General: Awake, alert, oriented X 3.   HEENT: Atraumatic, normocephalic.                 Mallampatti Grade                 No nasal congestion.                No tonsillar or pharyngeal exudates.  Lymph Nodes: No palpable lymphadenopathy  Neck: No JVD. No carotid bruit.   Respiratory: Normal chest expansion                         Normal percussion                         Normal and equal air entry                         No wheeze, rhonchi or rales.  Cardiovascular: S1 S2 normal. No murmurs, rubs or gallops.   Abdomen: Soft, non-tender, non-distended. No organomegaly.  Extremities: Warm to touch. Peripheral pulse palpable. No pedal edema.   Skin: No rashes or skin lesions  Neurological: Motor and sensory examination equal and normal in all four extremities.  Psychiatry: Appropriate mood and affect.    HOSPITAL MEDICATIONS:  MEDICATIONS  (STANDING):  ascorbic acid 500 milliGRAM(s) Oral daily  buDESOnide 160 MICROgram(s)/formoterol 4.5 MICROgram(s) Inhaler 2 Puff(s) Inhalation two times a day  calcium carbonate 1250 mG + Vitamin D (OsCal 500 + D) 1 Tablet(s) Oral three times a day  DAPTOmycin IVPB 800 milliGRAM(s) IV Intermittent every 24 hours  docusate sodium 100 milliGRAM(s) Oral three times a day  ferrous    sulfate 325 milliGRAM(s) Oral daily  folic acid 1 milliGRAM(s) Oral daily  melatonin 3 milliGRAM(s) Oral at bedtime  multivitamin 1 Tablet(s) Oral daily  nystatin    Suspension 520034 Unit(s) Oral every 6 hours  pantoprazole    Tablet 40 milliGRAM(s) Oral before breakfast  polyethylene glycol 3350 17 Gram(s) Oral daily  povidone iodine 10% Solution 1 Application(s) Topical three times a day  silver sulfADIAZINE 1% Cream 1 Application(s) Topical daily  sodium chloride 0.9%. 1000 milliLiter(s) (75 mL/Hr) IV Continuous <Continuous>  tiotropium 18 MICROgram(s) Capsule 1 Capsule(s) Inhalation daily    MEDICATIONS  (PRN):  acetaminophen   Tablet 650 milliGRAM(s) Oral every 6 hours PRN For Temp greater than 38 C (100.4 F)  acetaminophen   Tablet. 650 milliGRAM(s) Oral every 6 hours PRN Mild Pain (1 - 3)  HYDROmorphone  Injectable 0.5 milliGRAM(s) IV Push every 4 hours PRN Severe Pain (7 - 10)  lidocaine 2% Viscous 10 milliLiter(s) Swish and Spit every 6 hours PRN Mouth Sores  oxyCODONE    IR 5 milliGRAM(s) Oral every 4 hours PRN Moderate Pain (4 - 6)  senna 2 Tablet(s) Oral at bedtime PRN Constipation      LABS:                        6.9    16.44 )-----------( 350      ( 20 Apr 2018 07:57 )             22.0     04-21    136  |  97  |  7   ----------------------------<  103<H>  3.6   |  26  |  0.61    Ca    8.2<L>      21 Apr 2018 07:06      PT/INR - ( 20 Apr 2018 08:07 )   PT: 30.3 sec;   INR: 2.63 ratio                   MICROBIOLOGY:     RADIOLOGY:  [ ] Reviewed and interpreted by me    Point of Care Ultrasound Findings:    PFT:    EKG: CHIEF COMPLAINT: RLE wound    Interval Events:  afebrile overnight  feeling a bit better     REVIEW OF SYSTEMS:  Constitutional: No fevers or chills. No weight loss. No fatigue or generalised malaise.  Eyes: No itching or discharge from the eyes  ENT: No ear pain. No ear discharge. No nasal congestion. No post nasal drip. No epistaxis. No throat pain. No sore throat. No difficulty swallowing.   CV: No chest pain. No palpitations. No lightheadedness or dizziness.   Resp: No dyspnea at rest. No dyspnea on exertion. No orthopnea. No wheezing. No cough. No stridor. No sputum production. No chest pain with respiration.  GI: No nausea. No vomiting. No diarrhea.  MSK: RLE wound, pain improved  Integumentary: No skin lesions. No pedal edema.  Neurological: No gross motor weakness. No sensory changes.  [ ] All other systems negative  [ ] Unable to assess ROS because ________    OBJECTIVE:  ICU Vital Signs Last 24 Hrs  T(C): 36.6 (21 Apr 2018 05:12), Max: 37.2 (20 Apr 2018 19:15)  T(F): 97.9 (21 Apr 2018 05:12), Max: 99 (20 Apr 2018 19:15)  HR: 85 (21 Apr 2018 05:12) (85 - 89)  BP: 151/77 (21 Apr 2018 05:12) (129/73 - 151/77)  BP(mean): --  ABP: --  ABP(mean): --  RR: 18 (21 Apr 2018 05:12) (18 - 20)  SpO2: 94% (21 Apr 2018 05:12) (94% - 98%)        04-20 @ 07:01 - 04-21 @ 07:00  --------------------------------------------------------  IN: 850 mL / OUT: 775 mL / NET: 75 mL    04-21 @ 07:01 - 04-21 @ 09:28  --------------------------------------------------------  IN: 50 mL / OUT: 0 mL / NET: 50 mL      CAPILLARY BLOOD GLUCOSE          PHYSICAL EXAM:  General: Awake, alert, oriented X 3. Lying in bed, 2lpm nasal cannula, NAD  HEENT: Atraumatic, normocephalic.                 Mallampatti Grade 4                No nasal congestion.                No tonsillar or pharyngeal exudates.  Lymph Nodes: No palpable lymphadenopathy  Neck: No JVD. No carotid bruit.   Respiratory: Normal chest expansion                         Normal percussion                         Normal and equal air entry                         No wheeze, rhonchi or rales.  Cardiovascular: S1 S2 normal. No murmurs, rubs or gallops.   Abdomen: Soft, non-tender, non-distended. No organomegaly.  Extremities: Warm to touch.RLE wound dressing.  Skin: No rashes or skin lesions  Neurological: Motor and sensory examination equal and normal in all four extremities.  Psychiatry: Appropriate mood and affect.    HOSPITAL MEDICATIONS:  MEDICATIONS  (STANDING):  ascorbic acid 500 milliGRAM(s) Oral daily  buDESOnide 160 MICROgram(s)/formoterol 4.5 MICROgram(s) Inhaler 2 Puff(s) Inhalation two times a day  calcium carbonate 1250 mG + Vitamin D (OsCal 500 + D) 1 Tablet(s) Oral three times a day  DAPTOmycin IVPB 800 milliGRAM(s) IV Intermittent every 24 hours  docusate sodium 100 milliGRAM(s) Oral three times a day  ferrous    sulfate 325 milliGRAM(s) Oral daily  folic acid 1 milliGRAM(s) Oral daily  melatonin 3 milliGRAM(s) Oral at bedtime  multivitamin 1 Tablet(s) Oral daily  nystatin    Suspension 570612 Unit(s) Oral every 6 hours  pantoprazole    Tablet 40 milliGRAM(s) Oral before breakfast  polyethylene glycol 3350 17 Gram(s) Oral daily  povidone iodine 10% Solution 1 Application(s) Topical three times a day  silver sulfADIAZINE 1% Cream 1 Application(s) Topical daily  sodium chloride 0.9%. 1000 milliLiter(s) (75 mL/Hr) IV Continuous <Continuous>  tiotropium 18 MICROgram(s) Capsule 1 Capsule(s) Inhalation daily    MEDICATIONS  (PRN):  acetaminophen   Tablet 650 milliGRAM(s) Oral every 6 hours PRN For Temp greater than 38 C (100.4 F)  acetaminophen   Tablet. 650 milliGRAM(s) Oral every 6 hours PRN Mild Pain (1 - 3)  HYDROmorphone  Injectable 0.5 milliGRAM(s) IV Push every 4 hours PRN Severe Pain (7 - 10)  lidocaine 2% Viscous 10 milliLiter(s) Swish and Spit every 6 hours PRN Mouth Sores  oxyCODONE    IR 5 milliGRAM(s) Oral every 4 hours PRN Moderate Pain (4 - 6)  senna 2 Tablet(s) Oral at bedtime PRN Constipation      LABS:                        6.9    16.44 )-----------( 350      ( 20 Apr 2018 07:57 )             22.0     04-21    136  |  97  |  7   ----------------------------<  103<H>  3.6   |  26  |  0.61    Ca    8.2<L>      21 Apr 2018 07:06      PT/INR - ( 20 Apr 2018 08:07 )   PT: 30.3 sec;   INR: 2.63 ratio                   MICROBIOLOGY:     RADIOLOGY:  [ ] Reviewed and interpreted by me    Point of Care Ultrasound Findings:    PFT:    EKG:

## 2018-04-21 NOTE — CONSULT NOTE ADULT - ASSESSMENT
·	hx of acute kidney injury and now Serum Creatinine at baseline and stable, Volume Status : stable, k controlled, bicarb stable, considering Volume Status and Serum Creatinine the pt seems renaly optimized for procedure, recommand cont monitoring of Serum Creatinine , k, mg, phos , urine output     ·	anemia- follow up with team  ·	right knee joint replacmement + infection : follow up with ortho, Infectious disease specialist,

## 2018-04-21 NOTE — PROGRESS NOTE ADULT - ATTENDING COMMENTS
Pt remains afebrile, on daptomycin, doing well.  Respiratory status is stable, on nasal cannula, lungs clear  continue symbicort, spiriva  pain controlled  plan per primary team, ID, plastics, regarding wound care

## 2018-04-21 NOTE — PROGRESS NOTE ADULT - ASSESSMENT
Plan  - cont local wound care for now  - silvadene to right thigh/abd wound  - primary team discussing possibility of AKA; if so will need to coordinate w/ Plastics for closure  - will cont to follow

## 2018-04-22 LAB
ANION GAP SERPL CALC-SCNC: 14 MMOL/L — SIGNIFICANT CHANGE UP (ref 5–17)
BASOPHILS # BLD AUTO: 0.04 K/UL — SIGNIFICANT CHANGE UP (ref 0–0.2)
BASOPHILS NFR BLD AUTO: 0.3 % — SIGNIFICANT CHANGE UP (ref 0–2)
BUN SERPL-MCNC: 7 MG/DL — SIGNIFICANT CHANGE UP (ref 7–23)
CALCIUM SERPL-MCNC: 8.6 MG/DL — SIGNIFICANT CHANGE UP (ref 8.4–10.5)
CHLORIDE SERPL-SCNC: 96 MMOL/L — SIGNIFICANT CHANGE UP (ref 96–108)
CO2 SERPL-SCNC: 27 MMOL/L — SIGNIFICANT CHANGE UP (ref 22–31)
CREAT SERPL-MCNC: 0.65 MG/DL — SIGNIFICANT CHANGE UP (ref 0.5–1.3)
CRP SERPL-MCNC: 18.8 MG/DL — HIGH (ref 0–0.4)
EOSINOPHIL # BLD AUTO: 0.22 K/UL — SIGNIFICANT CHANGE UP (ref 0–0.5)
EOSINOPHIL NFR BLD AUTO: 1.4 % — SIGNIFICANT CHANGE UP (ref 0–6)
ERYTHROCYTE [SEDIMENTATION RATE] IN BLOOD: 45 MM/HR — HIGH (ref 0–20)
GLUCOSE SERPL-MCNC: 105 MG/DL — HIGH (ref 70–99)
HCT VFR BLD CALC: 26.3 % — LOW (ref 34.5–45)
HGB BLD-MCNC: 8.4 G/DL — LOW (ref 11.5–15.5)
IMM GRANULOCYTES NFR BLD AUTO: 0.8 % — SIGNIFICANT CHANGE UP (ref 0–1.5)
INR BLD: 2.2 RATIO — HIGH (ref 0.88–1.16)
LYMPHOCYTES # BLD AUTO: 1.59 K/UL — SIGNIFICANT CHANGE UP (ref 1–3.3)
LYMPHOCYTES # BLD AUTO: 10.3 % — LOW (ref 13–44)
MCHC RBC-ENTMCNC: 27.5 PG — SIGNIFICANT CHANGE UP (ref 27–34)
MCHC RBC-ENTMCNC: 31.9 GM/DL — LOW (ref 32–36)
MCV RBC AUTO: 86.2 FL — SIGNIFICANT CHANGE UP (ref 80–100)
MONOCYTES # BLD AUTO: 0.85 K/UL — SIGNIFICANT CHANGE UP (ref 0–0.9)
MONOCYTES NFR BLD AUTO: 5.5 % — SIGNIFICANT CHANGE UP (ref 2–14)
NEUTROPHILS # BLD AUTO: 12.67 K/UL — HIGH (ref 1.8–7.4)
NEUTROPHILS NFR BLD AUTO: 81.7 % — HIGH (ref 43–77)
PLATELET # BLD AUTO: 332 K/UL — SIGNIFICANT CHANGE UP (ref 150–400)
POTASSIUM SERPL-MCNC: 3.8 MMOL/L — SIGNIFICANT CHANGE UP (ref 3.5–5.3)
POTASSIUM SERPL-SCNC: 3.8 MMOL/L — SIGNIFICANT CHANGE UP (ref 3.5–5.3)
PROTHROM AB SERPL-ACNC: 25.3 SEC — HIGH (ref 10–13.1)
RBC # BLD: 3.05 M/UL — LOW (ref 3.8–5.2)
RBC # FLD: 17.7 % — HIGH (ref 10.3–14.5)
SODIUM SERPL-SCNC: 137 MMOL/L — SIGNIFICANT CHANGE UP (ref 135–145)
WBC # BLD: 15.5 K/UL — HIGH (ref 3.8–10.5)
WBC # FLD AUTO: 15.5 K/UL — HIGH (ref 3.8–10.5)

## 2018-04-22 RX ORDER — WARFARIN SODIUM 2.5 MG/1
0.5 TABLET ORAL ONCE
Qty: 0 | Refills: 0 | Status: COMPLETED | OUTPATIENT
Start: 2018-04-22 | End: 2018-04-22

## 2018-04-22 RX ADMIN — HYDROMORPHONE HYDROCHLORIDE 0.5 MILLIGRAM(S): 2 INJECTION INTRAMUSCULAR; INTRAVENOUS; SUBCUTANEOUS at 22:01

## 2018-04-22 RX ADMIN — PANTOPRAZOLE SODIUM 40 MILLIGRAM(S): 20 TABLET, DELAYED RELEASE ORAL at 05:28

## 2018-04-22 RX ADMIN — Medication 500000 UNIT(S): at 09:16

## 2018-04-22 RX ADMIN — Medication 1 APPLICATION(S): at 05:28

## 2018-04-22 RX ADMIN — BUDESONIDE AND FORMOTEROL FUMARATE DIHYDRATE 2 PUFF(S): 160; 4.5 AEROSOL RESPIRATORY (INHALATION) at 05:27

## 2018-04-22 RX ADMIN — HYDROMORPHONE HYDROCHLORIDE 0.5 MILLIGRAM(S): 2 INJECTION INTRAMUSCULAR; INTRAVENOUS; SUBCUTANEOUS at 16:36

## 2018-04-22 RX ADMIN — Medication 500000 UNIT(S): at 22:00

## 2018-04-22 RX ADMIN — TIOTROPIUM BROMIDE 1 CAPSULE(S): 18 CAPSULE ORAL; RESPIRATORY (INHALATION) at 11:13

## 2018-04-22 RX ADMIN — HYDROMORPHONE HYDROCHLORIDE 0.5 MILLIGRAM(S): 2 INJECTION INTRAMUSCULAR; INTRAVENOUS; SUBCUTANEOUS at 04:23

## 2018-04-22 RX ADMIN — Medication 1 APPLICATION(S): at 13:08

## 2018-04-22 RX ADMIN — Medication 1 TABLET(S): at 05:27

## 2018-04-22 RX ADMIN — Medication 1 TABLET(S): at 13:08

## 2018-04-22 RX ADMIN — Medication 1 TABLET(S): at 11:13

## 2018-04-22 RX ADMIN — DAPTOMYCIN 132 MILLIGRAM(S): 500 INJECTION, POWDER, LYOPHILIZED, FOR SOLUTION INTRAVENOUS at 08:24

## 2018-04-22 RX ADMIN — HYDROMORPHONE HYDROCHLORIDE 0.5 MILLIGRAM(S): 2 INJECTION INTRAMUSCULAR; INTRAVENOUS; SUBCUTANEOUS at 04:53

## 2018-04-22 RX ADMIN — WARFARIN SODIUM 0.5 MILLIGRAM(S): 2.5 TABLET ORAL at 22:00

## 2018-04-22 RX ADMIN — HYDROMORPHONE HYDROCHLORIDE 0.5 MILLIGRAM(S): 2 INJECTION INTRAMUSCULAR; INTRAVENOUS; SUBCUTANEOUS at 09:15

## 2018-04-22 RX ADMIN — Medication 500000 UNIT(S): at 16:19

## 2018-04-22 RX ADMIN — Medication 100 MILLIGRAM(S): at 13:08

## 2018-04-22 RX ADMIN — HYDROMORPHONE HYDROCHLORIDE 0.5 MILLIGRAM(S): 2 INJECTION INTRAMUSCULAR; INTRAVENOUS; SUBCUTANEOUS at 22:16

## 2018-04-22 RX ADMIN — Medication 1 TABLET(S): at 22:00

## 2018-04-22 RX ADMIN — Medication 1 APPLICATION(S): at 21:59

## 2018-04-22 RX ADMIN — Medication 1 MILLIGRAM(S): at 11:14

## 2018-04-22 RX ADMIN — Medication 500 MILLIGRAM(S): at 11:14

## 2018-04-22 RX ADMIN — Medication 500000 UNIT(S): at 04:23

## 2018-04-22 RX ADMIN — Medication 1 APPLICATION(S): at 11:15

## 2018-04-22 RX ADMIN — Medication 100 MILLIGRAM(S): at 22:00

## 2018-04-22 RX ADMIN — BUDESONIDE AND FORMOTEROL FUMARATE DIHYDRATE 2 PUFF(S): 160; 4.5 AEROSOL RESPIRATORY (INHALATION) at 17:55

## 2018-04-22 RX ADMIN — POLYETHYLENE GLYCOL 3350 17 GRAM(S): 17 POWDER, FOR SOLUTION ORAL at 11:14

## 2018-04-22 RX ADMIN — HYDROMORPHONE HYDROCHLORIDE 0.5 MILLIGRAM(S): 2 INJECTION INTRAMUSCULAR; INTRAVENOUS; SUBCUTANEOUS at 17:00

## 2018-04-22 RX ADMIN — HYDROMORPHONE HYDROCHLORIDE 0.5 MILLIGRAM(S): 2 INJECTION INTRAMUSCULAR; INTRAVENOUS; SUBCUTANEOUS at 09:30

## 2018-04-22 RX ADMIN — Medication 325 MILLIGRAM(S): at 11:14

## 2018-04-22 NOTE — PROGRESS NOTE ADULT - ASSESSMENT
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    # Fever, resolved  # Recent septic Rt TKR - abx spacer exchanged and plastics complex wound closure  # Aspiration PNA  # prox thigh swelling  # Supratherapeutic INR/coagulopathy, resolved  # PMR on chronic steroids    Plan:  remains afebrile, cont Dapto per ID, BC NTD  will transfuse another unit PRBC for cardio protection  CT femur shows no focal drainable fluid collection, nonspecific subq edema in pannus and medial thigh  appreciate ortho recs, no plans for AKA after family mtg  appreciate plastics/surgery recs, local wound care  will cont coumadin nd asa  appreciate cardio recs  pain control    plan of care dw patient, aid and NP

## 2018-04-22 NOTE — PROGRESS NOTE ADULT - SUBJECTIVE AND OBJECTIVE BOX
Pt seen/examined. Doing well. Pain controlled. No acute overnight complaints or events.    Vital Signs Last 24 Hrs  T(C): 36.6 (22 Apr 2018 05:12), Max: 37.3 (21 Apr 2018 20:19)  T(F): 97.9 (22 Apr 2018 05:12), Max: 99.1 (21 Apr 2018 20:19)  HR: 84 (22 Apr 2018 05:12) (84 - 90)  BP: 153/70 (22 Apr 2018 05:12) (130/74 - 153/70)  BP(mean): --  RR: 20 (22 Apr 2018 05:12) (18 - 20)  SpO2: 95% (22 Apr 2018 05:12) (94% - 96%)    - Gen: NAD  - RLE: Dressing changed by PRS; grossly unchanged appearance of proximal thigh area of erythema and of knee; SILT TN/SPN/DPN/SN; TA/EHL/gs intact    77yFemale s/p revision R TKA w/PRS closure    - Pain control  - DVT ppx  - OOB/PT  - Family to discuss with Dr Bass yesterday   - Family refusing amputation  - Continue wound care per plastics  -ID recs  -Will follow

## 2018-04-22 NOTE — PROGRESS NOTE ADULT - ASSESSMENT
Plan  - cont local wound care for now  - silvadene to right thigh/abd wound and betadine to knee eschar  - will cont to follow while in house

## 2018-04-22 NOTE — PROGRESS NOTE ADULT - SUBJECTIVE AND OBJECTIVE BOX
SUBJECTIVE:  Doing well.   No overnight events. Per note in chart pt./family discussion states unwilling for amp at this time and will cont. w/ local wound care. Pt. participating w/ PT and OOB. WBC downtrending.     OBJECTIVE:     ** VITAL SIGNS / I&O's **    T(C): 36.7 (04-22-18 @ 08:21), Max: 37.3 (04-21-18 @ 20:19)  T(F): 98.1 (04-22-18 @ 08:21), Max: 99.1 (04-21-18 @ 20:19)  HR: 85 (04-22-18 @ 08:21) (84 - 90)  BP: 149/81 (04-22-18 @ 08:21) (130/74 - 153/70)  RR: 18 (04-22-18 @ 08:21) (18 - 20)  SpO2: 95% (04-22-18 @ 08:21) (94% - 96%)      21 Apr 2018 07:01  -  22 Apr 2018 07:00  --------------------------------------------------------  IN:    IV PiggyBack: 50 mL    Oral Fluid: 660 mL  Total IN: 710 mL    OUT:    Voided: 100 mL  Total OUT: 100 mL    Total NET: 610 mL      22 Apr 2018 07:01  -  22 Apr 2018 08:45  --------------------------------------------------------  IN:    IV PiggyBack: 50 mL  Total IN: 50 mL    OUT:  Total OUT: 0 mL    Total NET: 50 mL          ** PHYSICAL EXAM **      - Gen: NAD  - RLE: Dressing changed by PRS; grossly unchanged appearance of proximal thigh area of erythema and of knee; SILT TN/SPN/DPN/SN; TA/EHL/gs intact    ** LABS **       137    |  96     |  7      ----------------------------<  105        ( 22 Apr 2018 06:16 )  3.8     |  27     |  0.65     Ca    8.6        ( 22 Apr 2018 06:16 )          CAPILLARY BLOOD GLUCOSE

## 2018-04-22 NOTE — PROGRESS NOTE ADULT - SUBJECTIVE AND OBJECTIVE BOX
Patient is a 77y old  Female who presents with a chief complaint of fever (11 Apr 2018 14:25)      SUBJECTIVE / OVERNIGHT EVENTS:    Patient seen and examined. mild pain in right knee. denies cp sob. no plans for AKA after family discussion.       Vital Signs Last 24 Hrs  T(C): 36.7 (22 Apr 2018 08:21), Max: 37.3 (21 Apr 2018 20:19)  T(F): 98.1 (22 Apr 2018 08:21), Max: 99.1 (21 Apr 2018 20:19)  HR: 85 (22 Apr 2018 08:21) (84 - 90)  BP: 149/81 (22 Apr 2018 08:21) (130/74 - 153/70)  BP(mean): --  RR: 18 (22 Apr 2018 08:21) (18 - 20)  SpO2: 95% (22 Apr 2018 08:21) (94% - 96%)  I&O's Summary    21 Apr 2018 07:01  -  22 Apr 2018 07:00  --------------------------------------------------------  IN: 710 mL / OUT: 100 mL / NET: 610 mL    22 Apr 2018 07:01  -  22 Apr 2018 09:22  --------------------------------------------------------  IN: 50 mL / OUT: 0 mL / NET: 50 mL        PE:  GENERAL: NAD, AAOx3, obese  HEAD:  Atraumatic, Normocephalic  EYES: EOMI, PERRLA, conjunctiva and sclera clear  NECK: Supple  CHEST/LUNG: CTABL, No wheeze  HEART: Regular rate and rhythm; + murmur  ABDOMEN: Soft, Nontender, Nondistended; Bowel sounds present, eschar over pannus, some erythema  : lund  EXTREMITIES:  2+ Peripheral Pulses, right knee incision with staples, crusting, right thigh bullae with serosanguinous liquid output, necrotic tissue  NEURO: No focal deficits    LABS:                        8.1    14.84 )-----------( 330      ( 21 Apr 2018 09:13 )             25.2     04-22    137  |  96  |  7   ----------------------------<  105<H>  3.8   |  27  |  0.65    Ca    8.6      22 Apr 2018 06:16      PT/INR - ( 21 Apr 2018 09:16 )   PT: 27.7 sec;   INR: 2.41 ratio           CAPILLARY BLOOD GLUCOSE                RADIOLOGY & ADDITIONAL TESTS:    Imaging Personally Reviewed:  [x] YES  [ ] NO    Consultant(s) Notes Reviewed:  [x] YES  [ ] NO    MEDICATIONS  (STANDING):  ascorbic acid 500 milliGRAM(s) Oral daily  buDESOnide 160 MICROgram(s)/formoterol 4.5 MICROgram(s) Inhaler 2 Puff(s) Inhalation two times a day  calcium carbonate 1250 mG + Vitamin D (OsCal 500 + D) 1 Tablet(s) Oral three times a day  DAPTOmycin IVPB 800 milliGRAM(s) IV Intermittent every 24 hours  docusate sodium 100 milliGRAM(s) Oral three times a day  ferrous    sulfate 325 milliGRAM(s) Oral daily  folic acid 1 milliGRAM(s) Oral daily  melatonin 3 milliGRAM(s) Oral at bedtime  multivitamin 1 Tablet(s) Oral daily  nystatin    Suspension 209400 Unit(s) Oral every 6 hours  pantoprazole    Tablet 40 milliGRAM(s) Oral before breakfast  polyethylene glycol 3350 17 Gram(s) Oral daily  povidone iodine 10% Solution 1 Application(s) Topical three times a day  silver sulfADIAZINE 1% Cream 1 Application(s) Topical daily  sodium chloride 0.9%. 1000 milliLiter(s) (75 mL/Hr) IV Continuous <Continuous>  tiotropium 18 MICROgram(s) Capsule 1 Capsule(s) Inhalation daily  warfarin 0.5 milliGRAM(s) Oral once    MEDICATIONS  (PRN):  acetaminophen   Tablet 650 milliGRAM(s) Oral every 6 hours PRN For Temp greater than 38 C (100.4 F)  acetaminophen   Tablet. 650 milliGRAM(s) Oral every 6 hours PRN Mild Pain (1 - 3)  HYDROmorphone  Injectable 0.5 milliGRAM(s) IV Push every 4 hours PRN Severe Pain (7 - 10)  lidocaine 2% Viscous 10 milliLiter(s) Swish and Spit every 6 hours PRN Mouth Sores  oxyCODONE    IR 5 milliGRAM(s) Oral every 4 hours PRN Moderate Pain (4 - 6)  senna 2 Tablet(s) Oral at bedtime PRN Constipation      Care Discussed with Consultants/Other Providers [x] YES  [ ] NO    HEALTH ISSUES - PROBLEM Dx:  Adjustment disorder, unspecified type  Delirium due to another medical condition  Pneumonia: Pneumonia  Aortic stenosis, severe: Aortic stenosis, severe  MYAH (obstructive sleep apnea): MYAH (obstructive sleep apnea)  Obesity: Obesity  Asthma: Asthma  SOB (shortness of breath): SOB (shortness of breath)  Chronic deep vein thrombosis (DVT) of lower extremity, unspecified laterality, unspecified vein: Chronic deep vein thrombosis (DVT) of lower extremity, unspecified laterality, unspecified vein  Arthralgia of knee, unspecified laterality: Arthralgia of knee, unspecified laterality  Hyperlipidemia, unspecified hyperlipidemia type: Hyperlipidemia, unspecified hyperlipidemia type  Asthma, unspecified asthma severity, unspecified whether complicated, unspecified whether persistent: Asthma, unspecified asthma severity, unspecified whether complicated, unspecified whether persistent  Coronary artery disease involving native coronary artery of native heart without angina pectoris: Coronary artery disease involving native coronary artery of native heart without angina pectoris  Gastroesophageal reflux disease, esophagitis presence not specified: Gastroesophageal reflux disease, esophagitis presence not specified  Fever, unspecified fever cause: Fever, unspecified fever cause

## 2018-04-23 LAB
BLD GP AB SCN SERPL QL: POSITIVE — SIGNIFICANT CHANGE UP
INR BLD: 2.19 RATIO — HIGH (ref 0.88–1.16)
PROTHROM AB SERPL-ACNC: 25.1 SEC — HIGH (ref 10–13.1)
RH IG SCN BLD-IMP: POSITIVE — SIGNIFICANT CHANGE UP

## 2018-04-23 PROCEDURE — 99232 SBSQ HOSP IP/OBS MODERATE 35: CPT

## 2018-04-23 RX ORDER — HYDROMORPHONE HYDROCHLORIDE 2 MG/ML
0.5 INJECTION INTRAMUSCULAR; INTRAVENOUS; SUBCUTANEOUS EVERY 4 HOURS
Qty: 0 | Refills: 0 | Status: DISCONTINUED | OUTPATIENT
Start: 2018-04-23 | End: 2018-04-24

## 2018-04-23 RX ORDER — WARFARIN SODIUM 2.5 MG/1
1 TABLET ORAL ONCE
Qty: 0 | Refills: 0 | Status: COMPLETED | OUTPATIENT
Start: 2018-04-23 | End: 2018-04-23

## 2018-04-23 RX ORDER — ASPIRIN/CALCIUM CARB/MAGNESIUM 324 MG
81 TABLET ORAL DAILY
Qty: 0 | Refills: 0 | Status: DISCONTINUED | OUTPATIENT
Start: 2018-04-23 | End: 2018-06-06

## 2018-04-23 RX ADMIN — Medication 1 TABLET(S): at 21:25

## 2018-04-23 RX ADMIN — HYDROMORPHONE HYDROCHLORIDE 0.5 MILLIGRAM(S): 2 INJECTION INTRAMUSCULAR; INTRAVENOUS; SUBCUTANEOUS at 16:43

## 2018-04-23 RX ADMIN — Medication 1 MILLIGRAM(S): at 13:02

## 2018-04-23 RX ADMIN — Medication 1 APPLICATION(S): at 13:04

## 2018-04-23 RX ADMIN — WARFARIN SODIUM 1 MILLIGRAM(S): 2.5 TABLET ORAL at 21:25

## 2018-04-23 RX ADMIN — Medication 1 APPLICATION(S): at 21:25

## 2018-04-23 RX ADMIN — Medication 1 TABLET(S): at 13:04

## 2018-04-23 RX ADMIN — PANTOPRAZOLE SODIUM 40 MILLIGRAM(S): 20 TABLET, DELAYED RELEASE ORAL at 05:48

## 2018-04-23 RX ADMIN — Medication 81 MILLIGRAM(S): at 13:14

## 2018-04-23 RX ADMIN — Medication 1 TABLET(S): at 13:02

## 2018-04-23 RX ADMIN — Medication 500000 UNIT(S): at 21:24

## 2018-04-23 RX ADMIN — Medication 500000 UNIT(S): at 05:47

## 2018-04-23 RX ADMIN — Medication 1 TABLET(S): at 05:48

## 2018-04-23 RX ADMIN — Medication 1 APPLICATION(S): at 13:03

## 2018-04-23 RX ADMIN — HYDROMORPHONE HYDROCHLORIDE 0.5 MILLIGRAM(S): 2 INJECTION INTRAMUSCULAR; INTRAVENOUS; SUBCUTANEOUS at 21:25

## 2018-04-23 RX ADMIN — HYDROMORPHONE HYDROCHLORIDE 0.5 MILLIGRAM(S): 2 INJECTION INTRAMUSCULAR; INTRAVENOUS; SUBCUTANEOUS at 10:29

## 2018-04-23 RX ADMIN — HYDROMORPHONE HYDROCHLORIDE 0.5 MILLIGRAM(S): 2 INJECTION INTRAMUSCULAR; INTRAVENOUS; SUBCUTANEOUS at 17:10

## 2018-04-23 RX ADMIN — HYDROMORPHONE HYDROCHLORIDE 0.5 MILLIGRAM(S): 2 INJECTION INTRAMUSCULAR; INTRAVENOUS; SUBCUTANEOUS at 06:01

## 2018-04-23 RX ADMIN — DAPTOMYCIN 132 MILLIGRAM(S): 500 INJECTION, POWDER, LYOPHILIZED, FOR SOLUTION INTRAVENOUS at 08:58

## 2018-04-23 RX ADMIN — BUDESONIDE AND FORMOTEROL FUMARATE DIHYDRATE 2 PUFF(S): 160; 4.5 AEROSOL RESPIRATORY (INHALATION) at 18:33

## 2018-04-23 RX ADMIN — Medication 100 MILLIGRAM(S): at 13:03

## 2018-04-23 RX ADMIN — Medication 1 APPLICATION(S): at 05:48

## 2018-04-23 RX ADMIN — HYDROMORPHONE HYDROCHLORIDE 0.5 MILLIGRAM(S): 2 INJECTION INTRAMUSCULAR; INTRAVENOUS; SUBCUTANEOUS at 10:03

## 2018-04-23 RX ADMIN — Medication 500000 UNIT(S): at 13:02

## 2018-04-23 RX ADMIN — TIOTROPIUM BROMIDE 1 CAPSULE(S): 18 CAPSULE ORAL; RESPIRATORY (INHALATION) at 13:03

## 2018-04-23 RX ADMIN — Medication 500 MILLIGRAM(S): at 13:02

## 2018-04-23 RX ADMIN — HYDROMORPHONE HYDROCHLORIDE 0.5 MILLIGRAM(S): 2 INJECTION INTRAMUSCULAR; INTRAVENOUS; SUBCUTANEOUS at 21:55

## 2018-04-23 RX ADMIN — HYDROMORPHONE HYDROCHLORIDE 0.5 MILLIGRAM(S): 2 INJECTION INTRAMUSCULAR; INTRAVENOUS; SUBCUTANEOUS at 06:16

## 2018-04-23 RX ADMIN — Medication 500000 UNIT(S): at 16:43

## 2018-04-23 RX ADMIN — BUDESONIDE AND FORMOTEROL FUMARATE DIHYDRATE 2 PUFF(S): 160; 4.5 AEROSOL RESPIRATORY (INHALATION) at 05:50

## 2018-04-23 RX ADMIN — Medication 325 MILLIGRAM(S): at 13:01

## 2018-04-23 RX ADMIN — Medication 3 MILLIGRAM(S): at 21:25

## 2018-04-23 NOTE — PROGRESS NOTE ADULT - ATTENDING COMMENTS
- cont local wound care for now  - silvadene to right thigh/abd wound and betadine to knee eschar  - will cont to follow while in house

## 2018-04-23 NOTE — PROGRESS NOTE ADULT - SUBJECTIVE AND OBJECTIVE BOX
SUBJECTIVE:  Doing well.   No overnight events. Continuing w/ dressing changes.     OBJECTIVE:     ** VITAL SIGNS / I&O's **    T(C): 37.2 (04-23-18 @ 05:46), Max: 37.2 (04-23-18 @ 05:46)  T(F): 98.9 (04-23-18 @ 05:46), Max: 98.9 (04-23-18 @ 05:46)  HR: 90 (04-23-18 @ 05:46) (85 - 103)  BP: 160/84 (04-23-18 @ 05:46) (127/80 - 160/84)  RR: 18 (04-23-18 @ 05:46) (18 - 18)  SpO2: 96% (04-23-18 @ 05:46) (95% - 99%)      21 Apr 2018 07:01  -  22 Apr 2018 07:00  --------------------------------------------------------  IN:    IV PiggyBack: 50 mL    Oral Fluid: 660 mL  Total IN: 710 mL    OUT:    Voided: 100 mL  Total OUT: 100 mL    Total NET: 610 mL      22 Apr 2018 07:01  -  23 Apr 2018 06:50  --------------------------------------------------------  IN:    IV PiggyBack: 50 mL    Oral Fluid: 740 mL    Packed Red Blood Cells: 305 mL  Total IN: 1095 mL    OUT:    Voided: 150 mL  Total OUT: 150 mL    Total NET: 945 mL          ** PHYSICAL EXAM **  - Gen: NAD  - RLE: Dressing changed by PRS; grossly unchanged appearance of proximal thigh area of erythema and of knee    ** LABS **                 CAPILLARY BLOOD GLUCOSE

## 2018-04-23 NOTE — PROGRESS NOTE ADULT - ATTENDING COMMENTS
as above-  persistent fever and fatigue an issue--?unresolved source of temps (doubt lungs)  Pulm relatively stable-atelectasis, prior PE, asthma, cardiac Dz; abnormal CT-likely represents aspiration PNA  Inhaler regimen as outlined above; *******Sp and Sw evaluation--all meals in chair etc  DVT rx /prophylaxis  ID follow up of ABX (completed 7 day overlap then Daptomycin alone at rehab)---? Endocarditis--ERIKA--if fevers recur--ID follow up   PT evaluation and Psych evaluation                                  Ortho to perform R-AKA over next week    Everett Kumar MD-Pulmonary   [phone] as above-  persistent fever and fatigue an issue--?unresolved source of temps (doubt lungs)  Pulm relatively stable-atelectasis, prior PE, asthma, cardiac Dz; abnormal CT-likely represents aspiration PNA  Inhaler regimen as outlined above; *******Sp and Sw evaluation--all meals in chair etc  DVT rx /prophylaxis  ID follow up of ABX (completed 7 day overlap then Daptomycin alone at rehab)---? Endocarditis--ERIKA--if fevers recur--ID follow up   PT evaluation and Psych evaluation                                  Ortho will not be performing R-AKA (family conference)    Everett Kumar MD-Pulmonary   [phone]

## 2018-04-23 NOTE — PROGRESS NOTE ADULT - SUBJECTIVE AND OBJECTIVE BOX
Pain controlled. No acute overnight complaints or events. No CP, SOB, no n/v.    ICU Vital Signs Last 24 Hrs  T(C): 37.2 (23 Apr 2018 05:46), Max: 37.2 (23 Apr 2018 05:46)  T(F): 98.9 (23 Apr 2018 05:46), Max: 98.9 (23 Apr 2018 05:46)  HR: 90 (23 Apr 2018 05:46) (85 - 103)  BP: 160/84 (23 Apr 2018 05:46) (127/80 - 160/84)  BP(mean): --  ABP: --  ABP(mean): --  RR: 18 (23 Apr 2018 05:46) (18 - 18)  SpO2: 96% (23 Apr 2018 05:46) (95% - 99%)    PE:  - Gen: NAD  - RLE: Dressing to be changed by PRS; unchanged appearance of proximal thigh area of erythema and of knee; SILT TN/SPN/DPN/SN; TA/EHL/gs intact    77y Female s/p revision R TKA w/PRS closure, no plan for Orthopaedic surgical intervention  - 50% weight bearing RLE, no knee motion, keep in extension  - Needs trapeze and PT  - Pain control  - DVT ppx  - OOB/PT  - Family refusing amputation  - Continue wound care per plastics  - ID recs

## 2018-04-23 NOTE — PROGRESS NOTE ADULT - PROBLEM SELECTOR PLAN 9
abnormal CT--c/w aspiration  Sp and Sw evaluation--aspiration precautions--ABX as per ID-total of 7 d overlap-completed -now on daptomycin alone abnormal CT--c/w aspiration  Sp and Sw evaluation--aspiration precautions--ABX as per ID-total of 7 d overlap-completed -now on daptomycin alone-as per ID

## 2018-04-23 NOTE — PROGRESS NOTE ADULT - SUBJECTIVE AND OBJECTIVE BOX
CC: f/u for  mrsa septic knee  Patient reports her knee hurts    REVIEW OF SYSTEMS:  All other review of systems negative (Comprehensive ROS)    Antimicrobials Day #  :30  DAPTOmycin IVPB 800 milliGRAM(s) IV Intermittent every 24 hours  nystatin    Suspension [id number] Unit(s) Oral every 6 hours    Other Medications Reviewed    T(F): 97.3 (04-23-18 @ 14:12), Max: 98.9 (04-23-18 @ 05:46)  HR: 82 (04-23-18 @ 14:12)  BP: 165/72 (04-23-18 @ 14:12)  RR: 18 (04-23-18 @ 14:12)  SpO2: 95% (04-23-18 @ 14:12)  Wt(kg): --    PHYSICAL EXAM:  General: alert, crying   Eyes:  anicteric, no conjunctival injection, no discharge  Oropharynx: no lesions or injection 	  Neck: supple, without adenopathy  Lungs: clear to auscultation  Heart: s1s2 2/6 sys   Abdomen: soft, nondistended, nontender, without mass or organomegaly  Skin: no lesions  Extremities: right medial thigh ulcerated wounds, soft. right knee eschar   Neurologic: alert, oriented, moves all extremities    LAB RESULTS:                        8.4    15.50 )-----------( 332      ( 22 Apr 2018 08:19 )             26.3     04-22    137  |  96  |  7   ----------------------------<  105<H>  3.8   |  27  |  0.65    Ca    8.6      22 Apr 2018 06:16      < from: CT Femur No Cont, Right (04.19.18 @ 19:06) >    IMPRESSION:  1.  Patient status post explantation of right total knee arthroplasty and   washout. Antibiotic cement and intramedullary ruthann are identified in the   right knee joint. No evidence of acute hardware complication.  2.  No focal, drainable fluid collections are identified on today's   examination.  3.  Nonspecific subcutaneous edema involving the patient's pannus and   subcutaneous fat of the medial thigh.      < end of copied text >      Assessment:  patient s/p strept infection of the blood and right knee s/p rudy and spacer, returned with nonhealing wound and had rudy, new spacer, fusion antibiotics coated ruthann and had mrsa grown so on daptomycin. Returned to hospital with right medial thigh hematoma which is now breaking down and ongoing nonhealing new wound over the knee and s/p tx for pneumonia. AKA being recommended which seems the most viable solution.   Plan: continue dapto  await further surgical decisions  favor general surgery f/u of the thigh

## 2018-04-23 NOTE — PROGRESS NOTE ADULT - ATTENDING COMMENTS
pain controlled, awake and alert  conversant, in improved mood  orthopedic trapeze set up today  afebrile    RLE  thigh swelling continues to improve and soften, no erythema, eschar stable, no further exudate  surgical wound intact with stable eschar, some separation of eschar medially from skin, no further exudate  no surrounding erythema around wound, minimal swelling  5/5 ta/ehl/gcs  silt l4-s1  2+ dp pulse    no plan for surgical intervention per family and patient preference  local wound care per plastics  continue IV antibiotic treatment per infectious disease   encourage her to spend majority of bed oob in bedside chair as able	  PT to help mobilize, she must remain 50% wb on the RLE and NO KNEE MOTION allowed, knee immobilizer if oob  coumadin, inr goal 2-3  continue to optimize nutrition  continue to involve psych    Nate Bass MD  Attending Orthopedic Surgeon

## 2018-04-23 NOTE — PROGRESS NOTE ADULT - SUBJECTIVE AND OBJECTIVE BOX
Patient is a 77y old  Female who presents with a chief complaint of fever (11 Apr 2018 14:25)      SUBJECTIVE / OVERNIGHT EVENTS:    Patient seen and examined. co mild nausea. eager to get out of bed. no acute events.      Vital Signs Last 24 Hrs  T(C): 37.2 (23 Apr 2018 05:46), Max: 37.2 (23 Apr 2018 05:46)  T(F): 98.9 (23 Apr 2018 05:46), Max: 98.9 (23 Apr 2018 05:46)  HR: 90 (23 Apr 2018 05:46) (86 - 103)  BP: 160/84 (23 Apr 2018 05:46) (127/80 - 160/84)  BP(mean): --  RR: 18 (23 Apr 2018 05:46) (18 - 18)  SpO2: 96% (23 Apr 2018 05:46) (96% - 99%)  I&O's Summary    22 Apr 2018 07:01  -  23 Apr 2018 07:00  --------------------------------------------------------  IN: 1455 mL / OUT: 150 mL / NET: 1305 mL    23 Apr 2018 07:01  -  23 Apr 2018 10:28  --------------------------------------------------------  IN: 50 mL / OUT: 0 mL / NET: 50 mL        PE:  GENERAL: NAD, AAOx3, obese  HEAD:  Atraumatic, Normocephalic  EYES: EOMI, PERRLA, conjunctiva and sclera clear  NECK: Supple  CHEST/LUNG: CTABL, No wheeze  HEART: Regular rate and rhythm; + murmur  ABDOMEN: Soft, Nontender, Nondistended; Bowel sounds present, eschar over pannus, some erythema  : lund  EXTREMITIES:  2+ Peripheral Pulses, right knee incision with staples, crusting, right thigh bullae with serosanguinous liquid output, necrotic tissue  NEURO: No focal deficits    LABS:                        8.4    15.50 )-----------( 332      ( 22 Apr 2018 08:19 )             26.3     04-22    137  |  96  |  7   ----------------------------<  105<H>  3.8   |  27  |  0.65    Ca    8.6      22 Apr 2018 06:16      PT/INR - ( 23 Apr 2018 09:37 )   PT: 25.1 sec;   INR: 2.19 ratio           CAPILLARY BLOOD GLUCOSE                RADIOLOGY & ADDITIONAL TESTS:    Imaging Personally Reviewed:  [x] YES  [ ] NO    Consultant(s) Notes Reviewed:  [x] YES  [ ] NO    MEDICATIONS  (STANDING):  ascorbic acid 500 milliGRAM(s) Oral daily  buDESOnide 160 MICROgram(s)/formoterol 4.5 MICROgram(s) Inhaler 2 Puff(s) Inhalation two times a day  calcium carbonate 1250 mG + Vitamin D (OsCal 500 + D) 1 Tablet(s) Oral three times a day  DAPTOmycin IVPB 800 milliGRAM(s) IV Intermittent every 24 hours  docusate sodium 100 milliGRAM(s) Oral three times a day  ferrous    sulfate 325 milliGRAM(s) Oral daily  folic acid 1 milliGRAM(s) Oral daily  melatonin 3 milliGRAM(s) Oral at bedtime  multivitamin 1 Tablet(s) Oral daily  nystatin    Suspension [id number] Unit(s) Oral every 6 hours  pantoprazole    Tablet 40 milliGRAM(s) Oral before breakfast  polyethylene glycol 3350 17 Gram(s) Oral daily  povidone iodine 10% Solution 1 Application(s) Topical three times a day  silver sulfADIAZINE 1% Cream 1 Application(s) Topical daily  sodium chloride 0.9%. 1000 milliLiter(s) (75 mL/Hr) IV Continuous <Continuous>  tiotropium 18 MICROgram(s) Capsule 1 Capsule(s) Inhalation daily    MEDICATIONS  (PRN):  acetaminophen   Tablet 650 milliGRAM(s) Oral every 6 hours PRN For Temp greater than 38 C (100.4 F)  acetaminophen   Tablet. 650 milliGRAM(s) Oral every 6 hours PRN Mild Pain (1 - 3)  HYDROmorphone  Injectable 0.5 milliGRAM(s) IV Push every 4 hours PRN Severe Pain (7 - 10)  lidocaine 2% Viscous 10 milliLiter(s) Swish and Spit every 6 hours PRN Mouth Sores  oxyCODONE    IR 5 milliGRAM(s) Oral every 4 hours PRN Moderate Pain (4 - 6)  senna 2 Tablet(s) Oral at bedtime PRN Constipation      Care Discussed with Consultants/Other Providers [x] YES  [ ] NO    HEALTH ISSUES - PROBLEM Dx:  Adjustment disorder, unspecified type  Delirium due to another medical condition  Pneumonia: Pneumonia  Aortic stenosis, severe: Aortic stenosis, severe  MYAH (obstructive sleep apnea): MYAH (obstructive sleep apnea)  Obesity: Obesity  Asthma: Asthma  SOB (shortness of breath): SOB (shortness of breath)  Chronic deep vein thrombosis (DVT) of lower extremity, unspecified laterality, unspecified vein: Chronic deep vein thrombosis (DVT) of lower extremity, unspecified laterality, unspecified vein  Arthralgia of knee, unspecified laterality: Arthralgia of knee, unspecified laterality  Hyperlipidemia, unspecified hyperlipidemia type: Hyperlipidemia, unspecified hyperlipidemia type  Asthma, unspecified asthma severity, unspecified whether complicated, unspecified whether persistent: Asthma, unspecified asthma severity, unspecified whether complicated, unspecified whether persistent  Coronary artery disease involving native coronary artery of native heart without angina pectoris: Coronary artery disease involving native coronary artery of native heart without angina pectoris  Gastroesophageal reflux disease, esophagitis presence not specified: Gastroesophageal reflux disease, esophagitis presence not specified  Fever, unspecified fever cause: Fever, unspecified fever cause

## 2018-04-23 NOTE — PROGRESS NOTE ADULT - ASSESSMENT
Patient with infected right tkr s/p rudy, spacer for strept infection, had poor wound healing so returned 3 months after and had spacer exchange and complex wound closure with mrsa infection found. She is about 2 weeks into dapto and returns with fever, leukocytosis, ongoing pain and eschar of right knee wound and what appears to be a hematoma of the right thigh. INR has been up so that is good reason for the thigh findings.  Consider infected hematoma possible. UTI is possible. Pneumonia on CT but assymptomatic  PMR decompensation or adrenal insufficiency a consideration at well.    Pulmonary stable relative to prior admits   CT abnormal--this suggests aspiration and not active PNA w/paucity of clinical signs and sx (ABX should cover her for both infections)    Concern over persistent fevers and lethargy-likely represents an untreated source of infection--ID following closely (?hematoma)--now afebrile for days as of 4/18    ortho. planning for R-AKA Patient with infected right tkr s/p rudy, spacer for strept infection, had poor wound healing so returned 3 months after and had spacer exchange and complex wound closure with mrsa infection found. She is about 2 weeks into dapto and returns with fever, leukocytosis, ongoing pain and eschar of right knee wound and what appears to be a hematoma of the right thigh. INR has been up so that is good reason for the thigh findings.  Consider infected hematoma possible. UTI is possible. Pneumonia on CT but assymptomatic  PMR decompensation or adrenal insufficiency a consideration at well.    Pulmonary stable relative to prior admits   CT abnormal--this suggests aspiration and not active PNA w/paucity of clinical signs and sx (ABX should cover her for both infections)    Concern over persistent fevers and lethargy-likely represents an untreated source of infection--ID following closely (?hematoma)--now afebrile for days as of 4/18    Family conference--no plans for R-AKA

## 2018-04-23 NOTE — PROGRESS NOTE ADULT - PROBLEM SELECTOR PLAN 4
multifactorial-leg and lung--on ABX as per ID (daptomycin)  need to reconsider other sources of infection-?endocarditis-if fevers persist multifactorial-leg and lung--on ABX as per ID (daptomycin)  need to reconsider other sources of infection-?endocarditis-if fevers persist/recurs

## 2018-04-23 NOTE — PROGRESS NOTE ADULT - SUBJECTIVE AND OBJECTIVE BOX
CHIEF COMPLAINT:    Interval Events:    REVIEW OF SYSTEMS:  Constitutional: No fevers or chills. No weight loss. No fatigue or generalized malaise.  Eyes: No itching or discharge from the eyes  ENT: No ear pain. No ear discharge. No nasal congestion. No post nasal drip. No epistaxis. No throat pain. No sore throat. No difficulty swallowing.   CV: No chest pain. No palpitations. No lightheadedness or dizziness.   Resp: No dyspnea at rest. No dyspnea on exertion. No orthopnea. No wheezing. No cough. No stridor. No sputum production. No chest pain with respiration.  GI: No nausea. No vomiting. No diarrhea.  MSK: No joint pain or pain in any extremities  Integumentary: No skin lesions. No pedal edema.  Neurological: No gross motor weakness. No sensory changes.  [ ] All other systems negative  [ ] Unable to assess ROS because ________    OBJECTIVE:  ICU Vital Signs Last 24 Hrs  T(C): 37.2 (23 Apr 2018 05:46), Max: 37.2 (23 Apr 2018 05:46)  T(F): 98.9 (23 Apr 2018 05:46), Max: 98.9 (23 Apr 2018 05:46)  HR: 90 (23 Apr 2018 05:46) (85 - 103)  BP: 160/84 (23 Apr 2018 05:46) (127/80 - 160/84)  BP(mean): --  ABP: --  ABP(mean): --  RR: 18 (23 Apr 2018 05:46) (18 - 18)  SpO2: 96% (23 Apr 2018 05:46) (95% - 99%)        04-21 @ 07:01 - 04-22 @ 07:00  --------------------------------------------------------  IN: 710 mL / OUT: 100 mL / NET: 610 mL    04-22 @ 07:01 - 04-23 @ 05:51  --------------------------------------------------------  IN: 1095 mL / OUT: 150 mL / NET: 945 mL      CAPILLARY BLOOD GLUCOSE          PHYSICAL EXAM:  General: Awake, alert, oriented X 3.   HEENT: Atraumatic, normocephalic.                 Mallampatti Grade                 No nasal congestion.                No tonsillar or pharyngeal exudates.  Lymph Nodes: No palpable lymphadenopathy  Neck: No JVD. No carotid bruit.   Respiratory: Normal chest expansion                         Normal percussion                         Normal and equal air entry                         No wheeze, rhonchi or rales.  Cardiovascular: S1 S2 normal. No murmurs, rubs or gallops.   Abdomen: Soft, non-tender, non-distended. No organomegaly.  Extremities: Warm to touch. Peripheral pulse palpable. No pedal edema.   Skin: No rashes or skin lesions  Neurological: Motor and sensory examination equal and normal in all four extremities.  Psychiatry: Appropriate mood and affect.    HOSPITAL MEDICATIONS:  MEDICATIONS  (STANDING):  ascorbic acid 500 milliGRAM(s) Oral daily  buDESOnide 160 MICROgram(s)/formoterol 4.5 MICROgram(s) Inhaler 2 Puff(s) Inhalation two times a day  calcium carbonate 1250 mG + Vitamin D (OsCal 500 + D) 1 Tablet(s) Oral three times a day  DAPTOmycin IVPB 800 milliGRAM(s) IV Intermittent every 24 hours  docusate sodium 100 milliGRAM(s) Oral three times a day  ferrous    sulfate 325 milliGRAM(s) Oral daily  folic acid 1 milliGRAM(s) Oral daily  melatonin 3 milliGRAM(s) Oral at bedtime  multivitamin 1 Tablet(s) Oral daily  nystatin    Suspension [id number] Unit(s) Oral every 6 hours  pantoprazole    Tablet 40 milliGRAM(s) Oral before breakfast  polyethylene glycol 3350 17 Gram(s) Oral daily  povidone iodine 10% Solution 1 Application(s) Topical three times a day  silver sulfADIAZINE 1% Cream 1 Application(s) Topical daily  sodium chloride 0.9%. 1000 milliLiter(s) (75 mL/Hr) IV Continuous <Continuous>  tiotropium 18 MICROgram(s) Capsule 1 Capsule(s) Inhalation daily    MEDICATIONS  (PRN):  acetaminophen   Tablet 650 milliGRAM(s) Oral every 6 hours PRN For Temp greater than 38 C (100.4 F)  acetaminophen   Tablet. 650 milliGRAM(s) Oral every 6 hours PRN Mild Pain (1 - 3)  HYDROmorphone  Injectable 0.5 milliGRAM(s) IV Push every 4 hours PRN Severe Pain (7 - 10)  lidocaine 2% Viscous 10 milliLiter(s) Swish and Spit every 6 hours PRN Mouth Sores  oxyCODONE    IR 5 milliGRAM(s) Oral every 4 hours PRN Moderate Pain (4 - 6)  senna 2 Tablet(s) Oral at bedtime PRN Constipation      LABS:                        8.4    15.50 )-----------( 332      ( 22 Apr 2018 08:19 )             26.3     04-22    137  |  96  |  7   ----------------------------<  105<H>  3.8   |  27  |  0.65    Ca    8.6      22 Apr 2018 06:16      PT/INR - ( 22 Apr 2018 08:19 )   PT: 25.3 sec;   INR: 2.20 ratio                   MICROBIOLOGY:     RADIOLOGY:  [ ] Reviewed and interpreted by me    Point of Care Ultrasound Findings:    PFT:    EKG: CHIEF COMPLAINT: some fatigue and mild sob, can move leg    Interval Events: ID, ortho, plastics    REVIEW OF SYSTEMS:  Constitutional: No fevers or chills. No weight loss. + fatigue or generalized malaise.  Eyes: No itching or discharge from the eyes  ENT: No ear pain. No ear discharge. No nasal congestion. No post nasal drip. No epistaxis. No throat pain. No sore throat. No difficulty swallowing.   CV: No chest pain. No palpitations. No lightheadedness or dizziness.   Resp: No dyspnea at rest. + dyspnea on exertion. No orthopnea. No wheezing. No cough. No stridor. No sputum production. No chest pain with respiration.  GI: No nausea. No vomiting. No diarrhea.  MSK: No joint pain or pain in any extremities  Integumentary: No skin lesions. No pedal edema.  Neurological: ++RLL gross motor weakness. No sensory changes.  [+ ] All other systems negative  [ ] Unable to assess ROS because ________    OBJECTIVE:  ICU Vital Signs Last 24 Hrs  T(C): 37.2 (23 Apr 2018 05:46), Max: 37.2 (23 Apr 2018 05:46)  T(F): 98.9 (23 Apr 2018 05:46), Max: 98.9 (23 Apr 2018 05:46)  HR: 90 (23 Apr 2018 05:46) (85 - 103)  BP: 160/84 (23 Apr 2018 05:46) (127/80 - 160/84)  BP(mean): --  ABP: --  ABP(mean): --  RR: 18 (23 Apr 2018 05:46) (18 - 18)  SpO2: 96% (23 Apr 2018 05:46) (95% - 99%)        04-21 @ 07:01  -  04-22 @ 07:00  --------------------------------------------------------  IN: 710 mL / OUT: 100 mL / NET: 610 mL    04-22 @ 07:01  -  04-23 @ 05:51  --------------------------------------------------------  IN: 1095 mL / OUT: 150 mL / NET: 945 mL      CAPILLARY BLOOD GLUCOSE          PHYSICAL EXAM: NAD in bed  General: Awake, alert, oriented X 3.   HEENT: Atraumatic, normocephalic.                 Mallampatti Grade 3                No nasal congestion.                No tonsillar or pharyngeal exudates.  Lymph Nodes: No palpable lymphadenopathy  Neck: No JVD. No carotid bruit.   Respiratory: Normal chest expansion--decreased BS bases                         Normal percussion                         Normal and equal air entry                         No wheeze, rhonchi or rales.  Cardiovascular: S1 S2 normal. No murmurs, rubs or gallops.   Abdomen: Soft, non-tender, non-distended. No organomegaly.  Extremities: Warm to touch. Peripheral pulse palpable. No pedal edema. right leg wound-bandaged  Skin: No rashes or skin lesions  Neurological: Motor and sensory examination equal and normal in all four extremities.  Psychiatry: Appropriate mood and affect.    HOSPITAL MEDICATIONS:  MEDICATIONS  (STANDING):  ascorbic acid 500 milliGRAM(s) Oral daily  buDESOnide 160 MICROgram(s)/formoterol 4.5 MICROgram(s) Inhaler 2 Puff(s) Inhalation two times a day  calcium carbonate 1250 mG + Vitamin D (OsCal 500 + D) 1 Tablet(s) Oral three times a day  DAPTOmycin IVPB 800 milliGRAM(s) IV Intermittent every 24 hours  docusate sodium 100 milliGRAM(s) Oral three times a day  ferrous    sulfate 325 milliGRAM(s) Oral daily  folic acid 1 milliGRAM(s) Oral daily  melatonin 3 milliGRAM(s) Oral at bedtime  multivitamin 1 Tablet(s) Oral daily  nystatin    Suspension [id number] Unit(s) Oral every 6 hours  pantoprazole    Tablet 40 milliGRAM(s) Oral before breakfast  polyethylene glycol 3350 17 Gram(s) Oral daily  povidone iodine 10% Solution 1 Application(s) Topical three times a day  silver sulfADIAZINE 1% Cream 1 Application(s) Topical daily  sodium chloride 0.9%. 1000 milliLiter(s) (75 mL/Hr) IV Continuous <Continuous>  tiotropium 18 MICROgram(s) Capsule 1 Capsule(s) Inhalation daily    MEDICATIONS  (PRN):  acetaminophen   Tablet 650 milliGRAM(s) Oral every 6 hours PRN For Temp greater than 38 C (100.4 F)  acetaminophen   Tablet. 650 milliGRAM(s) Oral every 6 hours PRN Mild Pain (1 - 3)  HYDROmorphone  Injectable 0.5 milliGRAM(s) IV Push every 4 hours PRN Severe Pain (7 - 10)  lidocaine 2% Viscous 10 milliLiter(s) Swish and Spit every 6 hours PRN Mouth Sores  oxyCODONE    IR 5 milliGRAM(s) Oral every 4 hours PRN Moderate Pain (4 - 6)  senna 2 Tablet(s) Oral at bedtime PRN Constipation      LABS:                        8.4    15.50 )-----------( 332      ( 22 Apr 2018 08:19 )             26.3     04-22    137  |  96  |  7   ----------------------------<  105<H>  3.8   |  27  |  0.65    Ca    8.6      22 Apr 2018 06:16      PT/INR - ( 22 Apr 2018 08:19 )   PT: 25.3 sec;   INR: 2.20 ratio                   MICROBIOLOGY:     RADIOLOGY:  [ ] Reviewed and interpreted by me    Point of Care Ultrasound Findings:    PFT:    EKG:

## 2018-04-23 NOTE — PROGRESS NOTE ADULT - ASSESSMENT
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    # Fever, resolved  # Recent septic Rt TKR - abx spacer exchanged and plastics complex wound closure  # Aspiration PNA  # prox thigh swelling  # Supratherapeutic INR/coagulopathy, resolved  # PMR on chronic steroids    Plan:  remains afebrile, cont Dapto per ID, BC NTD  if febrile again, can consider ERIKA ro endocarditis  h/h stable  CT femur shows no focal drainable fluid collection, nonspecific subq edema in pannus and medial thigh  appreciate ortho recs, no plans for AKA after family mtg  appreciate plastics/surgery recs, local wound care  will cont coumadin and asa  appreciate cardio recs  pain control  PT OOB    plan of care dw patient

## 2018-04-24 LAB
ANION GAP SERPL CALC-SCNC: 11 MMOL/L — SIGNIFICANT CHANGE UP (ref 5–17)
BUN SERPL-MCNC: 8 MG/DL — SIGNIFICANT CHANGE UP (ref 7–23)
CALCIUM SERPL-MCNC: 8.5 MG/DL — SIGNIFICANT CHANGE UP (ref 8.4–10.5)
CHLORIDE SERPL-SCNC: 97 MMOL/L — SIGNIFICANT CHANGE UP (ref 96–108)
CO2 SERPL-SCNC: 27 MMOL/L — SIGNIFICANT CHANGE UP (ref 22–31)
CREAT SERPL-MCNC: 0.64 MG/DL — SIGNIFICANT CHANGE UP (ref 0.5–1.3)
GLUCOSE SERPL-MCNC: 107 MG/DL — HIGH (ref 70–99)
HCT VFR BLD CALC: 27.4 % — LOW (ref 34.5–45)
HGB BLD-MCNC: 8.6 G/DL — LOW (ref 11.5–15.5)
INR BLD: 2.2 RATIO — HIGH (ref 0.88–1.16)
MCHC RBC-ENTMCNC: 27.2 PG — SIGNIFICANT CHANGE UP (ref 27–34)
MCHC RBC-ENTMCNC: 31.4 GM/DL — LOW (ref 32–36)
MCV RBC AUTO: 86.7 FL — SIGNIFICANT CHANGE UP (ref 80–100)
PLATELET # BLD AUTO: 276 K/UL — SIGNIFICANT CHANGE UP (ref 150–400)
POTASSIUM SERPL-MCNC: 3.5 MMOL/L — SIGNIFICANT CHANGE UP (ref 3.5–5.3)
POTASSIUM SERPL-SCNC: 3.5 MMOL/L — SIGNIFICANT CHANGE UP (ref 3.5–5.3)
PROTHROM AB SERPL-ACNC: 25.3 SEC — HIGH (ref 10–13.1)
RBC # BLD: 3.16 M/UL — LOW (ref 3.8–5.2)
RBC # FLD: 17.5 % — HIGH (ref 10.3–14.5)
SODIUM SERPL-SCNC: 135 MMOL/L — SIGNIFICANT CHANGE UP (ref 135–145)
WBC # BLD: 10.98 K/UL — HIGH (ref 3.8–10.5)
WBC # FLD AUTO: 10.98 K/UL — HIGH (ref 3.8–10.5)

## 2018-04-24 PROCEDURE — 99232 SBSQ HOSP IP/OBS MODERATE 35: CPT

## 2018-04-24 RX ORDER — ASCORBIC ACID 60 MG
1 TABLET,CHEWABLE ORAL
Qty: 0 | Refills: 0 | COMMUNITY
Start: 2018-04-24

## 2018-04-24 RX ORDER — WARFARIN SODIUM 2.5 MG/1
1 TABLET ORAL ONCE
Qty: 0 | Refills: 0 | Status: COMPLETED | OUTPATIENT
Start: 2018-04-24 | End: 2018-04-24

## 2018-04-24 RX ORDER — OXYCODONE HYDROCHLORIDE 5 MG/1
5 TABLET ORAL EVERY 4 HOURS
Qty: 0 | Refills: 0 | Status: DISCONTINUED | OUTPATIENT
Start: 2018-04-24 | End: 2018-04-25

## 2018-04-24 RX ORDER — LANOLIN ALCOHOL/MO/W.PET/CERES
1 CREAM (GRAM) TOPICAL
Qty: 0 | Refills: 0 | COMMUNITY
Start: 2018-04-24

## 2018-04-24 RX ORDER — WARFARIN SODIUM 2.5 MG/1
1 TABLET ORAL
Qty: 0 | Refills: 0 | COMMUNITY

## 2018-04-24 RX ORDER — ALPRAZOLAM 0.25 MG
0.25 TABLET ORAL ONCE
Qty: 0 | Refills: 0 | Status: DISCONTINUED | OUTPATIENT
Start: 2018-04-24 | End: 2018-05-04

## 2018-04-24 RX ADMIN — POLYETHYLENE GLYCOL 3350 17 GRAM(S): 17 POWDER, FOR SOLUTION ORAL at 13:04

## 2018-04-24 RX ADMIN — DAPTOMYCIN 132 MILLIGRAM(S): 500 INJECTION, POWDER, LYOPHILIZED, FOR SOLUTION INTRAVENOUS at 09:29

## 2018-04-24 RX ADMIN — Medication 1 TABLET(S): at 13:03

## 2018-04-24 RX ADMIN — PANTOPRAZOLE SODIUM 40 MILLIGRAM(S): 20 TABLET, DELAYED RELEASE ORAL at 06:11

## 2018-04-24 RX ADMIN — HYDROMORPHONE HYDROCHLORIDE 0.5 MILLIGRAM(S): 2 INJECTION INTRAMUSCULAR; INTRAVENOUS; SUBCUTANEOUS at 10:29

## 2018-04-24 RX ADMIN — OXYCODONE HYDROCHLORIDE 5 MILLIGRAM(S): 5 TABLET ORAL at 15:56

## 2018-04-24 RX ADMIN — Medication 1 APPLICATION(S): at 21:52

## 2018-04-24 RX ADMIN — BUDESONIDE AND FORMOTEROL FUMARATE DIHYDRATE 2 PUFF(S): 160; 4.5 AEROSOL RESPIRATORY (INHALATION) at 17:19

## 2018-04-24 RX ADMIN — Medication 1 APPLICATION(S): at 13:05

## 2018-04-24 RX ADMIN — Medication 650 MILLIGRAM(S): at 10:00

## 2018-04-24 RX ADMIN — Medication 1 TABLET(S): at 06:11

## 2018-04-24 RX ADMIN — Medication 650 MILLIGRAM(S): at 09:30

## 2018-04-24 RX ADMIN — Medication 100 MILLIGRAM(S): at 13:02

## 2018-04-24 RX ADMIN — Medication 500000 UNIT(S): at 21:50

## 2018-04-24 RX ADMIN — Medication 500 MILLIGRAM(S): at 13:05

## 2018-04-24 RX ADMIN — Medication 100 MILLIGRAM(S): at 06:11

## 2018-04-24 RX ADMIN — HYDROMORPHONE HYDROCHLORIDE 0.5 MILLIGRAM(S): 2 INJECTION INTRAMUSCULAR; INTRAVENOUS; SUBCUTANEOUS at 10:09

## 2018-04-24 RX ADMIN — Medication 1 TABLET(S): at 21:50

## 2018-04-24 RX ADMIN — Medication 500000 UNIT(S): at 09:30

## 2018-04-24 RX ADMIN — Medication 325 MILLIGRAM(S): at 13:04

## 2018-04-24 RX ADMIN — HYDROMORPHONE HYDROCHLORIDE 0.5 MILLIGRAM(S): 2 INJECTION INTRAMUSCULAR; INTRAVENOUS; SUBCUTANEOUS at 06:41

## 2018-04-24 RX ADMIN — HYDROMORPHONE HYDROCHLORIDE 0.5 MILLIGRAM(S): 2 INJECTION INTRAMUSCULAR; INTRAVENOUS; SUBCUTANEOUS at 06:11

## 2018-04-24 RX ADMIN — Medication 1 APPLICATION(S): at 13:06

## 2018-04-24 RX ADMIN — Medication 1 APPLICATION(S): at 06:12

## 2018-04-24 RX ADMIN — Medication 500000 UNIT(S): at 06:11

## 2018-04-24 RX ADMIN — Medication 3 MILLIGRAM(S): at 21:50

## 2018-04-24 RX ADMIN — OXYCODONE HYDROCHLORIDE 5 MILLIGRAM(S): 5 TABLET ORAL at 22:28

## 2018-04-24 RX ADMIN — Medication 100 MILLIGRAM(S): at 21:50

## 2018-04-24 RX ADMIN — WARFARIN SODIUM 1 MILLIGRAM(S): 2.5 TABLET ORAL at 21:50

## 2018-04-24 RX ADMIN — Medication 1 MILLIGRAM(S): at 13:04

## 2018-04-24 RX ADMIN — Medication 81 MILLIGRAM(S): at 13:03

## 2018-04-24 RX ADMIN — BUDESONIDE AND FORMOTEROL FUMARATE DIHYDRATE 2 PUFF(S): 160; 4.5 AEROSOL RESPIRATORY (INHALATION) at 06:11

## 2018-04-24 RX ADMIN — OXYCODONE HYDROCHLORIDE 5 MILLIGRAM(S): 5 TABLET ORAL at 16:26

## 2018-04-24 RX ADMIN — TIOTROPIUM BROMIDE 1 CAPSULE(S): 18 CAPSULE ORAL; RESPIRATORY (INHALATION) at 13:02

## 2018-04-24 NOTE — PROGRESS NOTE ADULT - ASSESSMENT
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    # Fever, resolved  # Recent septic Rt TKR - abx spacer exchanged and plastics complex wound closure  # Aspiration PNA  # Supratherapeutic INR/coagulopathy, resolved  # PMR on chronic steroids  # thigh hematoma, ulcer    Plan:  remains afebrile, cont Dapto per ID, BC NTD  if febrile again, can consider ERIKA ro endocarditis  h/h stable  CT femur shows no focal drainable fluid collection, nonspecific subq edema in pannus and medial thigh  appreciate ortho recs, no plans for [facility] after family mtg  appreciate plastics/surgery recs, local wound care  will cont coumadin and asa  appreciate cardio recs  pain control  PT OOB    plan of care dw patient 76yo f PMH including HLD, GERD, Anxiety, Anemia, CAD s/p HERBERT, severe AS (s/p TAVR & PPM 12/17 [employer] Model [id number]), 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    # Fever, resolved  # Recent septic Rt TKR - abx spacer exchanged and plastics complex wound closure  # Aspiration PNA, resolved  # Supratherapeutic INR/coagulopathy, resolved  # PMR on chronic steroids  # thigh hematoma, leg wound    Plan:  remains afebrile, cont Dapto per ID until 5/5, BC NTD, appreciate ID recs  h/h stable  CT femur shows no focal drainable fluid collection, nonspecific subq edema in pannus and medial thigh  appreciate ortho recs, no plans for [facility] after family mtg, yasmin Bass  appreciate plastics surgery recs, local wound care, to stabilize wound before considering rehab  will cont coumadin and asa  pain control, titrate to PO  PT OOB    plan of care dw patient, NP and Dr. Bass

## 2018-04-24 NOTE — PROGRESS NOTE ADULT - SUBJECTIVE AND OBJECTIVE BOX
Patient is a 77y old  Female who presents with a chief complaint of fever (11 Apr 2018 14:25)      SUBJECTIVE / OVERNIGHT EVENTS:    Patient seen and examined. sat in chair for 4 hours yesterday. currently doing PT in bed. denies cp sob. co pain in right leg.      Vital Signs Last 24 Hrs  T(C): 36.6 (24 Apr 2018 06:09), Max: 36.6 (23 Apr 2018 21:51)  T(F): 97.9 (24 Apr 2018 06:09), Max: 97.9 (23 Apr 2018 21:51)  HR: 83 (24 Apr 2018 06:09) (82 - 85)  BP: 145/80 (24 Apr 2018 06:09) (117/58 - 165/72)  BP(mean): --  RR: 18 (24 Apr 2018 06:09) (18 - 18)  SpO2: 94% (24 Apr 2018 06:09) (93% - 95%)  I&O's Summary    23 Apr 2018 07:01  -  24 Apr 2018 07:00  --------------------------------------------------------  IN: 1060 mL / OUT: 575 mL / NET: 485 mL        PE:  GENERAL: NAD, AAOx3, obese  HEAD:  Atraumatic, Normocephalic  EYES: EOMI, PERRLA, conjunctiva and sclera clear  NECK: Supple  CHEST/LUNG: CTABL, No wheeze  HEART: Regular rate and rhythm; + murmur  ABDOMEN: Soft, Nontender, Nondistended; Bowel sounds present, eschar over pannus, some erythema  : lund  EXTREMITIES:  2+ Peripheral Pulses, right knee incision with staples, crusting, right thigh bullae with serosanguinous liquid output, necrotic tissue  NEURO: No focal deficits    LABS:                        8.6    10.98 )-----------( 276      ( 24 Apr 2018 09:54 )             27.4     04-24    135  |  97  |  8   ----------------------------<  107<H>  3.5   |  27  |  0.64    Ca    8.5      24 Apr 2018 07:13      PT/INR - ( 24 Apr 2018 09:43 )   PT: 25.3 sec;   INR: 2.20 ratio           CAPILLARY BLOOD GLUCOSE                RADIOLOGY & ADDITIONAL TESTS:    Imaging Personally Reviewed:  [x] YES  [ ] NO    Consultant(s) Notes Reviewed:  [x] YES  [ ] NO    MEDICATIONS  (STANDING):  ascorbic acid 500 milliGRAM(s) Oral daily  aspirin enteric coated 81 milliGRAM(s) Oral daily  buDESOnide 160 MICROgram(s)/formoterol 4.5 MICROgram(s) Inhaler 2 Puff(s) Inhalation two times a day  calcium carbonate 1250 mG + Vitamin D (OsCal 500 + D) 1 Tablet(s) Oral three times a day  DAPTOmycin IVPB 800 milliGRAM(s) IV Intermittent every 24 hours  docusate sodium 100 milliGRAM(s) Oral three times a day  ferrous    sulfate 325 milliGRAM(s) Oral daily  folic acid 1 milliGRAM(s) Oral daily  melatonin 3 milliGRAM(s) Oral at bedtime  multivitamin 1 Tablet(s) Oral daily  nystatin    Suspension 786622 Unit(s) Oral every 6 hours  pantoprazole    Tablet 40 milliGRAM(s) Oral before breakfast  polyethylene glycol 3350 17 Gram(s) Oral daily  povidone iodine 10% Solution 1 Application(s) Topical three times a day  silver sulfADIAZINE 1% Cream 1 Application(s) Topical daily  sodium chloride 0.9%. 1000 milliLiter(s) (75 mL/Hr) IV Continuous <Continuous>  tiotropium 18 MICROgram(s) Capsule 1 Capsule(s) Inhalation daily    MEDICATIONS  (PRN):  acetaminophen   Tablet 650 milliGRAM(s) Oral every 6 hours PRN For Temp greater than 38 C (100.4 F)  acetaminophen   Tablet. 650 milliGRAM(s) Oral every 6 hours PRN Mild Pain (1 - 3)  HYDROmorphone  Injectable 0.5 milliGRAM(s) IV Push every 4 hours PRN Severe Pain (7 - 10)  lidocaine 2% Viscous 10 milliLiter(s) Swish and Spit every 6 hours PRN Mouth Sores  senna 2 Tablet(s) Oral at bedtime PRN Constipation      Care Discussed with Consultants/Other Providers [x] YES  [ ] NO    HEALTH ISSUES - PROBLEM Dx:  Adjustment disorder, unspecified type  Delirium due to another medical condition  Pneumonia: Pneumonia  Aortic stenosis, severe: Aortic stenosis, severe  MYAH (obstructive sleep apnea): MYAH (obstructive sleep apnea)  Obesity: Obesity  Asthma: Asthma  SOB (shortness of breath): SOB (shortness of breath)  Chronic deep vein thrombosis (DVT) of lower extremity, unspecified laterality, unspecified vein: Chronic deep vein thrombosis (DVT) of lower extremity, unspecified laterality, unspecified vein  Arthralgia of knee, unspecified laterality: Arthralgia of knee, unspecified laterality  Hyperlipidemia, unspecified hyperlipidemia type: Hyperlipidemia, unspecified hyperlipidemia type  Asthma, unspecified asthma severity, unspecified whether complicated, unspecified whether persistent: Asthma, unspecified asthma severity, unspecified whether complicated, unspecified whether persistent  Coronary artery disease involving native coronary artery of native heart without angina pectoris: Coronary artery disease involving native coronary artery of native heart without angina pectoris  Gastroesophageal reflux disease, esophagitis presence not specified: Gastroesophageal reflux disease, esophagitis presence not specified  Fever, unspecified fever cause: Fever, unspecified fever cause Patient is a 77y old  Female who presents with a chief complaint of fever (11 Apr 2018 14:25)      SUBJECTIVE / OVERNIGHT EVENTS:    Patient seen and examined. sat in chair for 4 hours yesterday. currently doing PT in bed. denies cp sob. co pain in right leg.      Vital Signs Last 24 Hrs  T(C): 36.6 (24 Apr 2018 06:09), Max: 36.6 (23 Apr 2018 21:51)  T(F): 97.9 (24 Apr 2018 06:09), Max: 97.9 (23 Apr 2018 21:51)  HR: 83 (24 Apr 2018 06:09) (82 - 85)  BP: 145/80 (24 Apr 2018 06:09) (117/58 - 165/72)  BP(mean): --  RR: 18 (24 Apr 2018 06:09) (18 - 18)  SpO2: 94% (24 Apr 2018 06:09) (93% - 95%)  I&O's Summary    23 Apr 2018 07:01  -  24 Apr 2018 07:00  --------------------------------------------------------  IN: 1060 mL / OUT: 575 mL / NET: 485 mL        PE:  GENERAL: NAD, AAOx3, obese  HEAD:  Atraumatic, Normocephalic  EYES: EOMI, PERRLA, conjunctiva and sclera clear  NECK: Supple  CHEST/LUNG: CTABL, No wheeze  HEART: Regular rate and rhythm; + murmur  ABDOMEN: Soft, Nontender, Nondistended; Bowel sounds present, eschar over pannus, some erythema  : lund  EXTREMITIES:  2+ Peripheral Pulses, right knee incision, crusting, right thigh with wound with serosanguinous drainage, necrotic tissue  NEURO: No focal deficits    LABS:                        8.6    10.98 )-----------( 276      ( 24 Apr 2018 09:54 )             27.4     04-24    135  |  97  |  8   ----------------------------<  107<H>  3.5   |  27  |  0.64    Ca    8.5      24 Apr 2018 07:13      PT/INR - ( 24 Apr 2018 09:43 )   PT: 25.3 sec;   INR: 2.20 ratio           CAPILLARY BLOOD GLUCOSE                RADIOLOGY & ADDITIONAL TESTS:    Imaging Personally Reviewed:  [x] YES  [ ] NO    Consultant(s) Notes Reviewed:  [x] YES  [ ] NO    MEDICATIONS  (STANDING):  ascorbic acid 500 milliGRAM(s) Oral daily  aspirin enteric coated 81 milliGRAM(s) Oral daily  buDESOnide 160 MICROgram(s)/formoterol 4.5 MICROgram(s) Inhaler 2 Puff(s) Inhalation two times a day  calcium carbonate 1250 mG + Vitamin D (OsCal 500 + D) 1 Tablet(s) Oral three times a day  DAPTOmycin IVPB 800 milliGRAM(s) IV Intermittent every 24 hours  docusate sodium 100 milliGRAM(s) Oral three times a day  ferrous    sulfate 325 milliGRAM(s) Oral daily  folic acid 1 milliGRAM(s) Oral daily  melatonin 3 milliGRAM(s) Oral at bedtime  multivitamin 1 Tablet(s) Oral daily  nystatin    Suspension 825671 Unit(s) Oral every 6 hours  pantoprazole    Tablet 40 milliGRAM(s) Oral before breakfast  polyethylene glycol 3350 17 Gram(s) Oral daily  povidone iodine 10% Solution 1 Application(s) Topical three times a day  silver sulfADIAZINE 1% Cream 1 Application(s) Topical daily  sodium chloride 0.9%. 1000 milliLiter(s) (75 mL/Hr) IV Continuous <Continuous>  tiotropium 18 MICROgram(s) Capsule 1 Capsule(s) Inhalation daily    MEDICATIONS  (PRN):  acetaminophen   Tablet 650 milliGRAM(s) Oral every 6 hours PRN For Temp greater than 38 C (100.4 F)  acetaminophen   Tablet. 650 milliGRAM(s) Oral every 6 hours PRN Mild Pain (1 - 3)  HYDROmorphone  Injectable 0.5 milliGRAM(s) IV Push every 4 hours PRN Severe Pain (7 - 10)  lidocaine 2% Viscous 10 milliLiter(s) Swish and Spit every 6 hours PRN Mouth Sores  senna 2 Tablet(s) Oral at bedtime PRN Constipation      Care Discussed with Consultants/Other Providers [x] YES  [ ] NO    HEALTH ISSUES - PROBLEM Dx:  Adjustment disorder, unspecified type  Delirium due to another medical condition  Pneumonia: Pneumonia  Aortic stenosis, severe: Aortic stenosis, severe  MYAH (obstructive sleep apnea): MYAH (obstructive sleep apnea)  Obesity: Obesity  Asthma: Asthma  SOB (shortness of breath): SOB (shortness of breath)  Chronic deep vein thrombosis (DVT) of lower extremity, unspecified laterality, unspecified vein: Chronic deep vein thrombosis (DVT) of lower extremity, unspecified laterality, unspecified vein  Arthralgia of knee, unspecified laterality: Arthralgia of knee, unspecified laterality  Hyperlipidemia, unspecified hyperlipidemia type: Hyperlipidemia, unspecified hyperlipidemia type  Asthma, unspecified asthma severity, unspecified whether complicated, unspecified whether persistent: Asthma, unspecified asthma severity, unspecified whether complicated, unspecified whether persistent  Coronary artery disease involving native coronary artery of native heart without angina pectoris: Coronary artery disease involving native coronary artery of native heart without angina pectoris  Gastroesophageal reflux disease, esophagitis presence not specified: Gastroesophageal reflux disease, esophagitis presence not specified  Fever, unspecified fever cause: Fever, unspecified fever cause

## 2018-04-24 NOTE — PROGRESS NOTE ADULT - SUBJECTIVE AND OBJECTIVE BOX
CC: f/u for MRSA RT [facility]    Patient reports desire to stay in the hospital longer.No cough or recent fever    REVIEW OF SYSTEMS:  All other review of systems negative (Comprehensive ROS)    Antimicrobials Day #  :day 31  DAPTOmycin IVPB 800 milliGRAM(s) IV Intermittent every 24 hours  nystatin    Suspension 311062 Unit(s) Oral every 6 hours    Other Medications Reviewed    T(F): 97.4 (04-24-18 @ 14:01), Max: 97.9 (04-23-18 @ 21:51)  HR: 85 (04-24-18 @ 14:01)  BP: 174/89 (04-24-18 @ 14:01)  RR: 18 (04-24-18 @ 14:01)  SpO2: 97% (04-24-18 @ 14:01)  Wt(kg): --    PHYSICAL EXAM:  General: alert, no acute distress, obese  Eyes:  anicteric, no conjunctival injection, no discharge  Oropharynx: no lesions or injection 	  Neck: supple, without adenopathy  Lungs: clear to auscultation, diminished at bases  Heart: regular rate and rhythm; no murmur, rubs or gallops  Abdomen: soft, nondistended, nontender, without mass or organomegaly  Skin: wounds dressed  Extremities: no clubbing, cyanosis, + edema Rt knee wrapped in immobilizer  Neurologic: alert, oriented, moves all extremities    LAB RESULTS:                        8.6    10.98 )-----------( 276      ( 24 Apr 2018 09:54 )             27.4     04-24    135  |  97  |  8   ----------------------------<  107<H>  3.5   |  27  |  0.64    Ca    8.5      24 Apr 2018 07:13          MICROBIOLOGY:  RECENT CULTURES:      RADIOLOGY REVIEWED:  < from: CT Femur No Cont, Right (04.19.18 @ 19:06) >  IMPRESSION:  1.  Patient status post explantation of right total knee arthroplasty and   washout. Antibiotic cement and intramedullary ruthann are identified in the   right knee joint. No evidence of acute hardware complication.  2.  No focal, drainable fluid collections are identified on today's   examination.  3.  Nonspecific subcutaneous edema involving the patient's pannus and   subcutaneous fat of the medial thigh.    < end of copied text >

## 2018-04-24 NOTE — PROGRESS NOTE ADULT - PROBLEM SELECTOR PLAN 4
multifactorial-leg and lung--on ABX as per ID (daptomycin)  need to reconsider other sources of infection-?endocarditis-if fevers persist/recurs

## 2018-04-24 NOTE — PROGRESS NOTE ADULT - ASSESSMENT
Patient with infected right tkr s/p rudy, spacer for strept infection, had poor wound healing so returned 3 months after and had spacer exchange and complex wound closure with mrsa infection found. She is about 2 weeks into dapto and returns with fever, leukocytosis, ongoing pain and eschar of right knee wound and what appears to be a hematoma of the right thigh. INR has been up so that is good reason for the thigh findings. Must consider infected hematoma possible. UTI is possible. Pneumonia is possible given basilar rales. retroperitoneal hematoma possible .  PMR decompensation or adrenal insufficiency a consideration at well.    Pulmonary stable relative to prior admits Patient with infected right tkr s/p rudy, spacer for strept infection, had poor wound healing so returned 3 months after and had spacer exchange and complex wound closure with mrsa infection found. She is about 2 weeks into dapto and returns with fever, leukocytosis, ongoing pain and eschar of right knee wound and what appears to be a hematoma of the right thigh. INR has been up so that is good reason for the thigh findings. Must consider infected hematoma possible. UTI is possible. Pneumonia is possible given basilar rales. retroperitoneal hematoma possible .  PMR decompensation or adrenal insufficiency a consideration at well.    Pulmonary stable relative to prior admits --  wound care in progress

## 2018-04-24 NOTE — PROGRESS NOTE ADULT - ATTENDING COMMENTS
as above-  Pulm relatively stable-atelectasis, prior PE, asthma, cardiac Dz  Inhaler regimen as outlined above  DVT rx /prophylaxis  ID follow up of ABX; PT evaluation and Psych evaluation    Everett Kumar MD-Pulmonary   [phone] as above-  Pulm relatively stable-atelectasis, prior PE, asthma, cardiac Dz  Inhaler regimen as outlined above  DVT rx /prophylaxis  ID follow up of ABX; PT evaluation and Psych evaluation    ortho/plastics follow up--?NIKI Kumar MD-Pulmonary   [phone]

## 2018-04-24 NOTE — PROGRESS NOTE ADULT - ATTENDING COMMENTS
I agree with the above note and have personally seen and examined this patient. All pertinent films have been reviewed. Please refer to clinical documentation of the history, physical examinations, data summary, and both assessment and plan as documented above and with which I agree.    pain controlled, awake and alert  new brace placed  afebrile  wbc much improved    RLE  thigh swelling continues to improve and soften, no erythema, eschar stable, no further exudate  surgical wound intact with stable eschar, some separation of eschar medially from skin, no further exudate  no surrounding erythema around wound, minimal swelling  5/5 ta/ehl/gcs  silt l4-s1  2+ dp pulse    no plan for surgical intervention per family and patient preference  local wound care per plastics  continue IV antibiotic treatment per infectious disease   encourage her to spend majority of bed oob in bedside chair as able	  PT to help mobilize, she must remain 50% wb on the RLE and NO KNEE MOTION allowed, knee immobilizer if oob  coumadin, inr goal 2-3  continue to optimize nutrition  continue to involve psych    Spoke with primary team attending and . Family prefers for patient to stay a few more day to progress more with PT. Upon follow up will need clear wound care plan from plastics team.     Follow up with Dr. Bass in 2 weeks after discharge.    Nate Bass MD  Attending Orthopedic Surgeon

## 2018-04-24 NOTE — CHART NOTE - NSCHARTNOTEFT_GEN_A_CORE
Fit and apply replacement Andie OR Merit hinged knee orthosis locked in extension. Current orthosis is contaminated and can  no longer be used.  Reviewed application skin precautions and care with aide and nursing staff. Contact information left bedside. To notify office with any issues questions or concerns.  Bernardo DALY  [facility]  [phone]

## 2018-04-24 NOTE — PROGRESS NOTE ADULT - SUBJECTIVE AND OBJECTIVE BOX
CHIEF COMPLAINT:    Interval Events:    REVIEW OF SYSTEMS:  Constitutional: No fevers or chills. No weight loss. No fatigue or generalized malaise.  Eyes: No itching or discharge from the eyes  ENT: No ear pain. No ear discharge. No nasal congestion. No post nasal drip. No epistaxis. No throat pain. No sore throat. No difficulty swallowing.   CV: No chest pain. No palpitations. No lightheadedness or dizziness.   Resp: No dyspnea at rest. No dyspnea on exertion. No orthopnea. No wheezing. No cough. No stridor. No sputum production. No chest pain with respiration.  GI: No nausea. No vomiting. No diarrhea.  MSK: No joint pain or pain in any extremities  Integumentary: No skin lesions. No pedal edema.  Neurological: No gross motor weakness. No sensory changes.  [ ] All other systems negative  [ ] Unable to assess ROS because ________    OBJECTIVE:  ICU Vital Signs Last 24 Hrs  T(C): 36.6 (23 Apr 2018 21:51), Max: 37.2 (23 Apr 2018 05:46)  T(F): 97.9 (23 Apr 2018 21:51), Max: 98.9 (23 Apr 2018 05:46)  HR: 85 (23 Apr 2018 21:51) (82 - 90)  BP: 117/58 (23 Apr 2018 21:51) (117/58 - 165/72)  BP(mean): --  ABP: --  ABP(mean): --  RR: 18 (23 Apr 2018 21:51) (18 - 18)  SpO2: 93% (23 Apr 2018 21:51) (93% - 96%)        04-22 @ 07:01 - 04-23 @ 07:00  --------------------------------------------------------  IN: 1455 mL / OUT: 150 mL / NET: 1305 mL    04-23 @ 07:01  - 04-24 @ 05:40  --------------------------------------------------------  IN: 820 mL / OUT: 250 mL / NET: 570 mL      CAPILLARY BLOOD GLUCOSE          PHYSICAL EXAM:  General: Awake, alert, oriented X 3.   HEENT: Atraumatic, normocephalic.                 Mallampatti Grade                 No nasal congestion.                No tonsillar or pharyngeal exudates.  Lymph Nodes: No palpable lymphadenopathy  Neck: No JVD. No carotid bruit.   Respiratory: Normal chest expansion                         Normal percussion                         Normal and equal air entry                         No wheeze, rhonchi or rales.  Cardiovascular: S1 S2 normal. No murmurs, rubs or gallops.   Abdomen: Soft, non-tender, non-distended. No organomegaly.  Extremities: Warm to touch. Peripheral pulse palpable. No pedal edema.   Skin: No rashes or skin lesions  Neurological: Motor and sensory examination equal and normal in all four extremities.  Psychiatry: Appropriate mood and affect.    HOSPITAL MEDICATIONS:  MEDICATIONS  (STANDING):  ascorbic acid 500 milliGRAM(s) Oral daily  aspirin enteric coated 81 milliGRAM(s) Oral daily  buDESOnide 160 MICROgram(s)/formoterol 4.5 MICROgram(s) Inhaler 2 Puff(s) Inhalation two times a day  calcium carbonate 1250 mG + Vitamin D (OsCal 500 + D) 1 Tablet(s) Oral three times a day  DAPTOmycin IVPB 800 milliGRAM(s) IV Intermittent every 24 hours  docusate sodium 100 milliGRAM(s) Oral three times a day  ferrous    sulfate 325 milliGRAM(s) Oral daily  folic acid 1 milliGRAM(s) Oral daily  melatonin 3 milliGRAM(s) Oral at bedtime  multivitamin 1 Tablet(s) Oral daily  nystatin    Suspension 189808 Unit(s) Oral every 6 hours  pantoprazole    Tablet 40 milliGRAM(s) Oral before breakfast  polyethylene glycol 3350 17 Gram(s) Oral daily  povidone iodine 10% Solution 1 Application(s) Topical three times a day  silver sulfADIAZINE 1% Cream 1 Application(s) Topical daily  sodium chloride 0.9%. 1000 milliLiter(s) (75 mL/Hr) IV Continuous <Continuous>  tiotropium 18 MICROgram(s) Capsule 1 Capsule(s) Inhalation daily    MEDICATIONS  (PRN):  acetaminophen   Tablet 650 milliGRAM(s) Oral every 6 hours PRN For Temp greater than 38 C (100.4 F)  acetaminophen   Tablet. 650 milliGRAM(s) Oral every 6 hours PRN Mild Pain (1 - 3)  HYDROmorphone  Injectable 0.5 milliGRAM(s) IV Push every 4 hours PRN Severe Pain (7 - 10)  lidocaine 2% Viscous 10 milliLiter(s) Swish and Spit every 6 hours PRN Mouth Sores  senna 2 Tablet(s) Oral at bedtime PRN Constipation      LABS:                        8.4    15.50 )-----------( 332      ( 22 Apr 2018 08:19 )             26.3     04-22    137  |  96  |  7   ----------------------------<  105<H>  3.8   |  27  |  0.65    Ca    8.6      22 Apr 2018 06:16      PT/INR - ( 23 Apr 2018 09:37 )   PT: 25.1 sec;   INR: 2.19 ratio                   MICROBIOLOGY:     RADIOLOGY:  [ ] Reviewed and interpreted by me    Point of Care Ultrasound Findings:    PFT:    EKG: CHIEF COMPLAINT: good spirits--no signif sob or cough, pain in right leg w/movement    Interval Events: PT today    REVIEW OF SYSTEMS:  Constitutional: No fevers or chills. No weight loss. No fatigue or generalized malaise.  Eyes: No itching or discharge from the eyes  ENT: No ear pain. No ear discharge. No nasal congestion. No post nasal drip. No epistaxis. No throat pain. No sore throat. No difficulty swallowing.   CV: No chest pain. No palpitations. No lightheadedness or dizziness.   Resp: No dyspnea at rest. + dyspnea on exertion. No orthopnea. No wheezing. No cough. No stridor. No sputum production. No chest pain with respiration.  GI: No nausea. No vomiting. No diarrhea.  MSK: No joint pain or pain in any extremities-except RLE  Integumentary: No skin lesions. No pedal edema.  Neurological: No gross motor weakness. No sensory changes.  [+ ] All other systems negative  [ ] Unable to assess ROS because ________    OBJECTIVE:  ICU Vital Signs Last 24 Hrs  T(C): 36.6 (23 Apr 2018 21:51), Max: 37.2 (23 Apr 2018 05:46)  T(F): 97.9 (23 Apr 2018 21:51), Max: 98.9 (23 Apr 2018 05:46)  HR: 85 (23 Apr 2018 21:51) (82 - 90)  BP: 117/58 (23 Apr 2018 21:51) (117/58 - 165/72)  BP(mean): --  ABP: --  ABP(mean): --  RR: 18 (23 Apr 2018 21:51) (18 - 18)  SpO2: 93% (23 Apr 2018 21:51) (93% - 96%)        04-22 @ 07:01  -  04-23 @ 07:00  --------------------------------------------------------  IN: 1455 mL / OUT: 150 mL / NET: 1305 mL    04-23 @ 07:01  -  04-24 @ 05:40  --------------------------------------------------------  IN: 820 mL / OUT: 250 mL / NET: 570 mL      CAPILLARY BLOOD GLUCOSE          PHYSICAL EXAM: NAD in bed  General: Awake, alert, oriented X 3.   HEENT: Atraumatic, normocephalic.                 Mallampatti Grade 3                No nasal congestion.                No tonsillar or pharyngeal exudates.  Lymph Nodes: No palpable lymphadenopathy  Neck: No JVD. No carotid bruit.   Respiratory: Normal chest expansion-reduced BS bases                         Normal percussion                         Normal and equal air entry                         No wheeze, rhonchi or rales.  Cardiovascular: S1 S2 normal. No murmurs, rubs or gallops.   Abdomen: Soft, non-tender, non-distended. No organomegaly.  Extremities: Warm to touch. Peripheral pulse palpable. No pedal edema. RLE wrapped  Skin: No rashes or skin lesions  Neurological: Motor and sensory examination equal and normal in all four extremities.  Psychiatry: Appropriate mood and affect.    HOSPITAL MEDICATIONS:  MEDICATIONS  (STANDING):  ascorbic acid 500 milliGRAM(s) Oral daily  aspirin enteric coated 81 milliGRAM(s) Oral daily  buDESOnide 160 MICROgram(s)/formoterol 4.5 MICROgram(s) Inhaler 2 Puff(s) Inhalation two times a day  calcium carbonate 1250 mG + Vitamin D (OsCal 500 + D) 1 Tablet(s) Oral three times a day  DAPTOmycin IVPB 800 milliGRAM(s) IV Intermittent every 24 hours  docusate sodium 100 milliGRAM(s) Oral three times a day  ferrous    sulfate 325 milliGRAM(s) Oral daily  folic acid 1 milliGRAM(s) Oral daily  melatonin 3 milliGRAM(s) Oral at bedtime  multivitamin 1 Tablet(s) Oral daily  nystatin    Suspension 085657 Unit(s) Oral every 6 hours  pantoprazole    Tablet 40 milliGRAM(s) Oral before breakfast  polyethylene glycol 3350 17 Gram(s) Oral daily  povidone iodine 10% Solution 1 Application(s) Topical three times a day  silver sulfADIAZINE 1% Cream 1 Application(s) Topical daily  sodium chloride 0.9%. 1000 milliLiter(s) (75 mL/Hr) IV Continuous <Continuous>  tiotropium 18 MICROgram(s) Capsule 1 Capsule(s) Inhalation daily    MEDICATIONS  (PRN):  acetaminophen   Tablet 650 milliGRAM(s) Oral every 6 hours PRN For Temp greater than 38 C (100.4 F)  acetaminophen   Tablet. 650 milliGRAM(s) Oral every 6 hours PRN Mild Pain (1 - 3)  HYDROmorphone  Injectable 0.5 milliGRAM(s) IV Push every 4 hours PRN Severe Pain (7 - 10)  lidocaine 2% Viscous 10 milliLiter(s) Swish and Spit every 6 hours PRN Mouth Sores  senna 2 Tablet(s) Oral at bedtime PRN Constipation      LABS:                        8.4    15.50 )-----------( 332      ( 22 Apr 2018 08:19 )             26.3     04-22    137  |  96  |  7   ----------------------------<  105<H>  3.8   |  27  |  0.65    Ca    8.6      22 Apr 2018 06:16      PT/INR - ( 23 Apr 2018 09:37 )   PT: 25.1 sec;   INR: 2.19 ratio                   MICROBIOLOGY:     RADIOLOGY:  [ ] Reviewed and interpreted by me    Point of Care Ultrasound Findings:    PFT:    EKG:

## 2018-04-24 NOTE — PROGRESS NOTE ADULT - SUBJECTIVE AND OBJECTIVE BOX
No acute events overnight. Pain well controlled with pain medications. Patient alert and cooperative during exam.    Vital Signs Last 24 Hrs  T(C): 36.6 (24 Apr 2018 06:09), Max: 36.6 (23 Apr 2018 21:51)  T(F): 97.9 (24 Apr 2018 06:09), Max: 97.9 (23 Apr 2018 21:51)  HR: 83 (24 Apr 2018 06:09) (82 - 85)  BP: 145/80 (24 Apr 2018 06:09) (117/58 - 165/72)  BP(mean): --  RR: 18 (24 Apr 2018 06:09) (18 - 18)  SpO2: 94% (24 Apr 2018 06:09) (93% - 95%)    Exam:  Gen: NAD, R thigh wound with local wound care, appears to be improving, R knee wound with stable eschar  Motor: EHL/FHL/TA/Gastrocnemius intact  Sensory: SILT DP/SP/S/S/T nerve distributions    A/P: 77 year old female s/p R knee abx spacer  - Pain Control  - No acute orthopedic intervention at this time per family/patient  - Continue IV Abx per ID  - PT/OT, OOB  - INR goal 2-3  - Will obtain new [place] brace for patient.

## 2018-04-24 NOTE — PROGRESS NOTE ADULT - ASSESSMENT
- cont local wound care for now  - silvadene to right thigh/abd wound and betadine to knee eschar  - will cont to follow while in house.

## 2018-04-24 NOTE — PROGRESS NOTE ADULT - PROBLEM SELECTOR PLAN 9
abnormal CT--c/w aspiration  Sp and Sw evaluation--aspiration precautions--ABX as per ID-total of 7 d overlap-completed -now on daptomycin alone-as per ID

## 2018-04-24 NOTE — PROGRESS NOTE ADULT - ASSESSMENT
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    She returned to rehab on IV Dapto, but readmitted with fever, leukocytosis and found to have multifocal pneumonia.   Completed 7 days vanco and aztreonam  Dependent edema and thigh hematoma, no discreet collection on Sono previously, but now with seropurulent drainage- ?secondary infection  Afebrile, blood cultures negative, leukocytosis has moderated  CT: no drainable collections noted  Patient is refusing AKA  Plan:  Continue Dapto monotherapy targeting MRSA, day 31/42   Follow temps and CBC/dif  After IV course is completed we can offer her po MCN for antibiotic suppression  [place] is guarded, appreciate detailed notes of Dr Bass  Discharge planning as per plastics and medicine

## 2018-04-24 NOTE — PROGRESS NOTE ADULT - SUBJECTIVE AND OBJECTIVE BOX
Plastic Surgery Progress Note    S: Patient seen and examined.  no new complaints.    Vital Signs Last 24 Hrs  T(C): 36.6 (24 Apr 2018 06:09), Max: 36.6 (23 Apr 2018 21:51)  T(F): 97.9 (24 Apr 2018 06:09), Max: 97.9 (23 Apr 2018 21:51)  HR: 83 (24 Apr 2018 06:09) (82 - 85)  BP: 145/80 (24 Apr 2018 06:09) (117/58 - 165/72)  BP(mean): --  RR: 18 (24 Apr 2018 06:09) (18 - 18)  SpO2: 94% (24 Apr 2018 06:09) (93% - 95%)  I&O's Detail    23 Apr 2018 07:01  -  24 Apr 2018 07:00  --------------------------------------------------------  IN:    IV PiggyBack: 100 mL    Oral Fluid: 960 mL  Total IN: 1060 mL    OUT:    Voided: 575 mL  Total OUT: 575 mL    Total NET: 485 mL          Physical Exam:  General: Awake and alert, NAD  RLE: Knee eschar stable, slightly increased area of "dehiscence" of the eschar from the medial intact skin.  proximal incision intact.  most proximal wound evolving, recently covered with silvadene.

## 2018-04-25 LAB
BASOPHILS # BLD AUTO: 0 K/UL — SIGNIFICANT CHANGE UP (ref 0–0.2)
BASOPHILS NFR BLD AUTO: 0.3 % — SIGNIFICANT CHANGE UP (ref 0–2)
EOSINOPHIL # BLD AUTO: 0.2 K/UL — SIGNIFICANT CHANGE UP (ref 0–0.5)
EOSINOPHIL NFR BLD AUTO: 2.1 % — SIGNIFICANT CHANGE UP (ref 0–6)
HCT VFR BLD CALC: 28.3 % — LOW (ref 34.5–45)
HGB BLD-MCNC: 9.1 G/DL — LOW (ref 11.5–15.5)
INR BLD: 2.42 RATIO — HIGH (ref 0.88–1.16)
LYMPHOCYTES # BLD AUTO: 1.7 K/UL — SIGNIFICANT CHANGE UP (ref 1–3.3)
LYMPHOCYTES # BLD AUTO: 15.3 % — SIGNIFICANT CHANGE UP (ref 13–44)
MCHC RBC-ENTMCNC: 28.4 PG — SIGNIFICANT CHANGE UP (ref 27–34)
MCHC RBC-ENTMCNC: 32 GM/DL — SIGNIFICANT CHANGE UP (ref 32–36)
MCV RBC AUTO: 88.6 FL — SIGNIFICANT CHANGE UP (ref 80–100)
MONOCYTES # BLD AUTO: 0.6 K/UL — SIGNIFICANT CHANGE UP (ref 0–0.9)
MONOCYTES NFR BLD AUTO: 5.6 % — SIGNIFICANT CHANGE UP (ref 2–14)
NEUTROPHILS # BLD AUTO: 8.3 K/UL — HIGH (ref 1.8–7.4)
NEUTROPHILS NFR BLD AUTO: 76.7 % — SIGNIFICANT CHANGE UP (ref 43–77)
PLATELET # BLD AUTO: 275 K/UL — SIGNIFICANT CHANGE UP (ref 150–400)
PROTHROM AB SERPL-ACNC: 26.9 SEC — HIGH (ref 9.8–12.7)
RBC # BLD: 3.2 M/UL — LOW (ref 3.8–5.2)
RBC # FLD: 15.6 % — HIGH (ref 10.3–14.5)
WBC # BLD: 10.8 K/UL — HIGH (ref 3.8–10.5)
WBC # FLD AUTO: 10.8 K/UL — HIGH (ref 3.8–10.5)

## 2018-04-25 PROCEDURE — 99232 SBSQ HOSP IP/OBS MODERATE 35: CPT

## 2018-04-25 RX ORDER — HYDROMORPHONE HYDROCHLORIDE 2 MG/ML
0.5 INJECTION INTRAMUSCULAR; INTRAVENOUS; SUBCUTANEOUS EVERY 6 HOURS
Qty: 0 | Refills: 0 | Status: DISCONTINUED | OUTPATIENT
Start: 2018-04-25 | End: 2018-05-02

## 2018-04-25 RX ORDER — WARFARIN SODIUM 2.5 MG/1
0.5 TABLET ORAL ONCE
Qty: 0 | Refills: 0 | Status: COMPLETED | OUTPATIENT
Start: 2018-04-25 | End: 2018-04-25

## 2018-04-25 RX ORDER — OXYCODONE HYDROCHLORIDE 5 MG/1
2.5 TABLET ORAL EVERY 4 HOURS
Qty: 0 | Refills: 0 | Status: DISCONTINUED | OUTPATIENT
Start: 2018-04-25 | End: 2018-05-02

## 2018-04-25 RX ORDER — ZINC OXIDE 200 MG/G
1 OINTMENT TOPICAL DAILY
Qty: 0 | Refills: 0 | Status: DISCONTINUED | OUTPATIENT
Start: 2018-04-25 | End: 2018-06-06

## 2018-04-25 RX ADMIN — Medication 1 TABLET(S): at 21:54

## 2018-04-25 RX ADMIN — TIOTROPIUM BROMIDE 1 CAPSULE(S): 18 CAPSULE ORAL; RESPIRATORY (INHALATION) at 14:18

## 2018-04-25 RX ADMIN — HYDROMORPHONE HYDROCHLORIDE 0.5 MILLIGRAM(S): 2 INJECTION INTRAMUSCULAR; INTRAVENOUS; SUBCUTANEOUS at 12:00

## 2018-04-25 RX ADMIN — Medication 500000 UNIT(S): at 05:21

## 2018-04-25 RX ADMIN — Medication 1 TABLET(S): at 14:19

## 2018-04-25 RX ADMIN — HYDROMORPHONE HYDROCHLORIDE 0.5 MILLIGRAM(S): 2 INJECTION INTRAMUSCULAR; INTRAVENOUS; SUBCUTANEOUS at 23:30

## 2018-04-25 RX ADMIN — Medication 325 MILLIGRAM(S): at 14:17

## 2018-04-25 RX ADMIN — ZINC OXIDE 1 APPLICATION(S): 200 OINTMENT TOPICAL at 14:28

## 2018-04-25 RX ADMIN — Medication 1 TABLET(S): at 05:21

## 2018-04-25 RX ADMIN — Medication 500000 UNIT(S): at 21:54

## 2018-04-25 RX ADMIN — Medication 650 MILLIGRAM(S): at 14:23

## 2018-04-25 RX ADMIN — BUDESONIDE AND FORMOTEROL FUMARATE DIHYDRATE 2 PUFF(S): 160; 4.5 AEROSOL RESPIRATORY (INHALATION) at 05:22

## 2018-04-25 RX ADMIN — WARFARIN SODIUM 0.5 MILLIGRAM(S): 2.5 TABLET ORAL at 21:54

## 2018-04-25 RX ADMIN — Medication 1 MILLIGRAM(S): at 14:18

## 2018-04-25 RX ADMIN — Medication 1 APPLICATION(S): at 21:53

## 2018-04-25 RX ADMIN — HYDROMORPHONE HYDROCHLORIDE 0.5 MILLIGRAM(S): 2 INJECTION INTRAMUSCULAR; INTRAVENOUS; SUBCUTANEOUS at 16:44

## 2018-04-25 RX ADMIN — Medication 500 MILLIGRAM(S): at 14:17

## 2018-04-25 RX ADMIN — Medication 100 MILLIGRAM(S): at 21:54

## 2018-04-25 RX ADMIN — HYDROMORPHONE HYDROCHLORIDE 0.5 MILLIGRAM(S): 2 INJECTION INTRAMUSCULAR; INTRAVENOUS; SUBCUTANEOUS at 17:18

## 2018-04-25 RX ADMIN — Medication 100 MILLIGRAM(S): at 14:18

## 2018-04-25 RX ADMIN — DAPTOMYCIN 132 MILLIGRAM(S): 500 INJECTION, POWDER, LYOPHILIZED, FOR SOLUTION INTRAVENOUS at 10:48

## 2018-04-25 RX ADMIN — Medication 500000 UNIT(S): at 09:56

## 2018-04-25 RX ADMIN — Medication 1 APPLICATION(S): at 14:17

## 2018-04-25 RX ADMIN — Medication 1 TABLET(S): at 14:17

## 2018-04-25 RX ADMIN — Medication 3 MILLIGRAM(S): at 21:54

## 2018-04-25 RX ADMIN — PANTOPRAZOLE SODIUM 40 MILLIGRAM(S): 20 TABLET, DELAYED RELEASE ORAL at 05:21

## 2018-04-25 RX ADMIN — BUDESONIDE AND FORMOTEROL FUMARATE DIHYDRATE 2 PUFF(S): 160; 4.5 AEROSOL RESPIRATORY (INHALATION) at 16:48

## 2018-04-25 RX ADMIN — Medication 1 APPLICATION(S): at 05:21

## 2018-04-25 RX ADMIN — Medication 650 MILLIGRAM(S): at 15:20

## 2018-04-25 RX ADMIN — HYDROMORPHONE HYDROCHLORIDE 0.5 MILLIGRAM(S): 2 INJECTION INTRAMUSCULAR; INTRAVENOUS; SUBCUTANEOUS at 11:30

## 2018-04-25 RX ADMIN — Medication 500000 UNIT(S): at 16:47

## 2018-04-25 RX ADMIN — Medication 81 MILLIGRAM(S): at 14:17

## 2018-04-25 RX ADMIN — Medication 100 MILLIGRAM(S): at 05:21

## 2018-04-25 RX ADMIN — HYDROMORPHONE HYDROCHLORIDE 0.5 MILLIGRAM(S): 2 INJECTION INTRAMUSCULAR; INTRAVENOUS; SUBCUTANEOUS at 22:55

## 2018-04-25 NOTE — PROGRESS NOTE ADULT - ATTENDING COMMENTS
as above-  Pulm relatively stable-atelectasis, prior PE, asthma, cardiac Dz  Inhaler regimen as outlined above  DVT rx /prophylaxis  ID follow up of ABX; PT evaluation and Psych evaluation    ortho/plastics follow up--?NIKI Kumar MD-Pulmonary   [phone] as above-  Pulm relatively stable-atelectasis, prior PE, asthma, cardiac Dz  Inhaler regimen as outlined above  DVT rx /prophylaxis  ID follow up of ABX  (IV for 42 days total then PO minocycline) PT evaluation and Psych evaluation    ortho/plastics follow up--?NIKI Kumar MD-Pulmonary   [phone]

## 2018-04-25 NOTE — PROGRESS NOTE ADULT - SUBJECTIVE AND OBJECTIVE BOX
CHIEF COMPLAINT:    Interval Events:    REVIEW OF SYSTEMS:  Constitutional: No fevers or chills. No weight loss. No fatigue or generalized malaise.  Eyes: No itching or discharge from the eyes  ENT: No ear pain. No ear discharge. No nasal congestion. No post nasal drip. No epistaxis. No throat pain. No sore throat. No difficulty swallowing.   CV: No chest pain. No palpitations. No lightheadedness or dizziness.   Resp: No dyspnea at rest. No dyspnea on exertion. No orthopnea. No wheezing. No cough. No stridor. No sputum production. No chest pain with respiration.  GI: No nausea. No vomiting. No diarrhea.  MSK: No joint pain or pain in any extremities  Integumentary: No skin lesions. No pedal edema.  Neurological: No gross motor weakness. No sensory changes.  [ ] All other systems negative  [ ] Unable to assess ROS because ________    OBJECTIVE:  ICU Vital Signs Last 24 Hrs  T(C): 36.8 (25 Apr 2018 04:16), Max: 37.1 (24 Apr 2018 21:29)  T(F): 98.3 (25 Apr 2018 04:16), Max: 98.7 (24 Apr 2018 21:29)  HR: 87 (25 Apr 2018 04:16) (83 - 94)  BP: 135/72 (25 Apr 2018 04:16) (135/72 - 174/89)  BP(mean): --  ABP: --  ABP(mean): --  RR: 18 (25 Apr 2018 04:16) (18 - 18)  SpO2: 96% (25 Apr 2018 04:16) (94% - 97%)        04-23 @ 07:01 - 04-24 @ 07:00  --------------------------------------------------------  IN: 1060 mL / OUT: 575 mL / NET: 485 mL    04-24 @ 07:01  -  04-25 @ 05:02  --------------------------------------------------------  IN: 340 mL / OUT: 825 mL / NET: -485 mL      CAPILLARY BLOOD GLUCOSE          PHYSICAL EXAM:  General: Awake, alert, oriented X 3.   HEENT: Atraumatic, normocephalic.                 Mallampatti Grade                 No nasal congestion.                No tonsillar or pharyngeal exudates.  Lymph Nodes: No palpable lymphadenopathy  Neck: No JVD. No carotid bruit.   Respiratory: Normal chest expansion                         Normal percussion                         Normal and equal air entry                         No wheeze, rhonchi or rales.  Cardiovascular: S1 S2 normal. No murmurs, rubs or gallops.   Abdomen: Soft, non-tender, non-distended. No organomegaly.  Extremities: Warm to touch. Peripheral pulse palpable. No pedal edema.   Skin: No rashes or skin lesions  Neurological: Motor and sensory examination equal and normal in all four extremities.  Psychiatry: Appropriate mood and affect.    HOSPITAL MEDICATIONS:  MEDICATIONS  (STANDING):  ALPRAZolam 0.25 milliGRAM(s) Oral once  ascorbic acid 500 milliGRAM(s) Oral daily  aspirin enteric coated 81 milliGRAM(s) Oral daily  buDESOnide 160 MICROgram(s)/formoterol 4.5 MICROgram(s) Inhaler 2 Puff(s) Inhalation two times a day  calcium carbonate 1250 mG + Vitamin D (OsCal 500 + D) 1 Tablet(s) Oral three times a day  DAPTOmycin IVPB 800 milliGRAM(s) IV Intermittent every 24 hours  docusate sodium 100 milliGRAM(s) Oral three times a day  ferrous    sulfate 325 milliGRAM(s) Oral daily  folic acid 1 milliGRAM(s) Oral daily  melatonin 3 milliGRAM(s) Oral at bedtime  multivitamin 1 Tablet(s) Oral daily  nystatin    Suspension 789541 Unit(s) Oral every 6 hours  pantoprazole    Tablet 40 milliGRAM(s) Oral before breakfast  polyethylene glycol 3350 17 Gram(s) Oral daily  povidone iodine 10% Solution 1 Application(s) Topical three times a day  silver sulfADIAZINE 1% Cream 1 Application(s) Topical daily  sodium chloride 0.9%. 1000 milliLiter(s) (75 mL/Hr) IV Continuous <Continuous>  tiotropium 18 MICROgram(s) Capsule 1 Capsule(s) Inhalation daily    MEDICATIONS  (PRN):  acetaminophen   Tablet 650 milliGRAM(s) Oral every 6 hours PRN For Temp greater than 38 C (100.4 F)  acetaminophen   Tablet. 650 milliGRAM(s) Oral every 6 hours PRN Mild Pain (1 - 3)  lidocaine 2% Viscous 10 milliLiter(s) Swish and Spit every 6 hours PRN Mouth Sores  oxyCODONE    IR 5 milliGRAM(s) Oral every 4 hours PRN Moderate Pain (4 - 6)  senna 2 Tablet(s) Oral at bedtime PRN Constipation      LABS:                        8.6    10.98 )-----------( 276      ( 24 Apr 2018 09:54 )             27.4     04-24    135  |  97  |  8   ----------------------------<  107<H>  3.5   |  27  |  0.64    Ca    8.5      24 Apr 2018 07:13      PT/INR - ( 24 Apr 2018 09:43 )   PT: 25.3 sec;   INR: 2.20 ratio                   MICROBIOLOGY:     RADIOLOGY:  [ ] Reviewed and interpreted by me    Point of Care Ultrasound Findings:    PFT:    EKG: CHIEF COMPLAINT: pain at wound site upon movement    Interval Events: ortho, plastics and ID following    REVIEW OF SYSTEMS:  Constitutional: No fevers or chills. No weight loss. + fatigue or generalized malaise.  Eyes: No itching or discharge from the eyes  ENT: No ear pain. No ear discharge. No nasal congestion. No post nasal drip. No epistaxis. No throat pain. No sore throat. No difficulty swallowing.   CV: No chest pain. No palpitations. No lightheadedness or dizziness.   Resp: No dyspnea at rest. + dyspnea on exertion. No orthopnea. No wheezing. No cough. No stridor. No sputum production. No chest pain with respiration.  GI: No nausea. No vomiting. No diarrhea.  MSK: No joint pain or pain in any extremities  Integumentary: No skin lesions. No pedal edema. Right leg wound  Neurological: No gross motor weakness. No sensory changes.  [+ ] All other systems negative  [ ] Unable to assess ROS because ________    OBJECTIVE:  ICU Vital Signs Last 24 Hrs  T(C): 36.8 (25 Apr 2018 04:16), Max: 37.1 (24 Apr 2018 21:29)  T(F): 98.3 (25 Apr 2018 04:16), Max: 98.7 (24 Apr 2018 21:29)  HR: 87 (25 Apr 2018 04:16) (83 - 94)  BP: 135/72 (25 Apr 2018 04:16) (135/72 - 174/89)  BP(mean): --  ABP: --  ABP(mean): --  RR: 18 (25 Apr 2018 04:16) (18 - 18)  SpO2: 96% (25 Apr 2018 04:16) (94% - 97%)        04-23 @ 07:01  -  04-24 @ 07:00  --------------------------------------------------------  IN: 1060 mL / OUT: 575 mL / NET: 485 mL    04-24 @ 07:01  -  04-25 @ 05:02  --------------------------------------------------------  IN: 340 mL / OUT: 825 mL / NET: -485 mL      CAPILLARY BLOOD GLUCOSE          PHYSICAL EXAM: NAD in bed  General: Awake, alert, oriented X 3.   HEENT: Atraumatic, normocephalic.                 Mallampatti Grade 3                No nasal congestion.                No tonsillar or pharyngeal exudates.  Lymph Nodes: No palpable lymphadenopathy  Neck: No JVD. No carotid bruit.   Respiratory: abnormal chest expansion-reduced BS bases                         Normal percussion                         Normal and equal air entry                         No wheeze, rhonchi or rales.  Cardiovascular: S1 S2 normal. No murmurs, rubs or gallops.   Abdomen: Soft, non-tender, non-distended. No organomegaly.  Extremities: Warm to touch. Peripheral pulse palpable. No pedal edema. R-leg wound  Skin: No rashes or skin lesions  Neurological: Motor and sensory examination equal and normal in all four extremities.  Psychiatry: Appropriate mood and affect.    HOSPITAL MEDICATIONS:  MEDICATIONS  (STANDING):  ALPRAZolam 0.25 milliGRAM(s) Oral once  ascorbic acid 500 milliGRAM(s) Oral daily  aspirin enteric coated 81 milliGRAM(s) Oral daily  buDESOnide 160 MICROgram(s)/formoterol 4.5 MICROgram(s) Inhaler 2 Puff(s) Inhalation two times a day  calcium carbonate 1250 mG + Vitamin D (OsCal 500 + D) 1 Tablet(s) Oral three times a day  DAPTOmycin IVPB 800 milliGRAM(s) IV Intermittent every 24 hours  docusate sodium 100 milliGRAM(s) Oral three times a day  ferrous    sulfate 325 milliGRAM(s) Oral daily  folic acid 1 milliGRAM(s) Oral daily  melatonin 3 milliGRAM(s) Oral at bedtime  multivitamin 1 Tablet(s) Oral daily  nystatin    Suspension 982194 Unit(s) Oral every 6 hours  pantoprazole    Tablet 40 milliGRAM(s) Oral before breakfast  polyethylene glycol 3350 17 Gram(s) Oral daily  povidone iodine 10% Solution 1 Application(s) Topical three times a day  silver sulfADIAZINE 1% Cream 1 Application(s) Topical daily  sodium chloride 0.9%. 1000 milliLiter(s) (75 mL/Hr) IV Continuous <Continuous>  tiotropium 18 MICROgram(s) Capsule 1 Capsule(s) Inhalation daily    MEDICATIONS  (PRN):  acetaminophen   Tablet 650 milliGRAM(s) Oral every 6 hours PRN For Temp greater than 38 C (100.4 F)  acetaminophen   Tablet. 650 milliGRAM(s) Oral every 6 hours PRN Mild Pain (1 - 3)  lidocaine 2% Viscous 10 milliLiter(s) Swish and Spit every 6 hours PRN Mouth Sores  oxyCODONE    IR 5 milliGRAM(s) Oral every 4 hours PRN Moderate Pain (4 - 6)  senna 2 Tablet(s) Oral at bedtime PRN Constipation      LABS:                        8.6    10.98 )-----------( 276      ( 24 Apr 2018 09:54 )             27.4     04-24    135  |  97  |  8   ----------------------------<  107<H>  3.5   |  27  |  0.64    Ca    8.5      24 Apr 2018 07:13      PT/INR - ( 24 Apr 2018 09:43 )   PT: 25.3 sec;   INR: 2.20 ratio                   MICROBIOLOGY:     RADIOLOGY:  [ ] Reviewed and interpreted by me    Point of Care Ultrasound Findings:    PFT:    EKG:

## 2018-04-25 NOTE — PROGRESS NOTE ADULT - ASSESSMENT
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    She returned to rehab on IV Dapto, but readmitted with fever, leukocytosis and found to have multifocal pneumonia.   Completed 7 days vanco and aztreonam  Dependent edema and thigh hematoma, no discreet collection on Sono   Afebrile, blood cultures negative, leukocytosis has moderated  CT: no drainable collections noted  Patient is refusing AKA, conservative management planned  Plan:  Continue Dapto monotherapy targeting MRSA, day 32/42   Follow temps and CBC/dif  After IV course is completed we can offer her po MCN for antibiotic suppression, 100 mg bid  [place] is guarded, appreciate detailed notes of Dr Bass  Discharge planning as per plastics and medicine

## 2018-04-25 NOTE — PROGRESS NOTE ADULT - ASSESSMENT
Patient with infected right tkr s/p rudy, spacer for strept infection, had poor wound healing so returned 3 months after and had spacer exchange and complex wound closure with mrsa infection found. She is about 2 weeks into dapto and returns with fever, leukocytosis, ongoing pain and eschar of right knee wound and what appears to be a hematoma of the right thigh. INR has been up so that is good reason for the thigh findings. Must consider infected hematoma possible. UTI is possible. Pneumonia is possible given basilar rales. retroperitoneal hematoma possible .  PMR decompensation or adrenal insufficiency a consideration at well.    Pulmonary stable relative to prior admits --  wound care in progress

## 2018-04-25 NOTE — PROGRESS NOTE ADULT - ATTENDING COMMENTS
plan to dc to rehab probably Friday-pending plastics wound care plan    Reina Murphy MD ( [facility])  [phone]

## 2018-04-25 NOTE — PROGRESS NOTE ADULT - ASSESSMENT
no plan for surgical intervention per family and patient preference  local wound care per plastics  continue IV antibiotic treatment per infectious disease   encourage her to spend majority of bed oob in bedside chair as able	  PT to help mobilize, she must remain 50% wb on the RLE and NO KNEE MOTION allowed, knee immobilizer if oob  coumadin, inr goal 2-3  continue to optimize nutrition  continue to involve psych  	  Please do not discharge until clear plan per plastics team for wound care and follow up.   Needs to progress more with PT.   Follow up with Dr. Bass in 2 weeks after discharge.    Nate Bass MD  Attending Orthopedic Surgeon

## 2018-04-25 NOTE — PROGRESS NOTE ADULT - ASSESSMENT
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    # Fever, resolved  # Recent septic Rt TKR - abx spacer exchanged and plastics complex wound closure  # Aspiration PNA, resolved  # Supratherapeutic INR/coagulopathy, resolved  # PMR on chronic steroids  # thigh hematoma, leg wound    Plan:  remains afebrile, cont Dapto per ID until 5/5, BC NTD, appreciate ID recs  h/h stable  CT femur shows no focal drainable fluid collection, nonspecific subq edema in pannus and medial thigh  appreciate ortho recs, no plans for [facility] after family mtg, dw Dr Bass  appreciate plastics surgery recs, local wound care, to stabilize wound before considering rehab  will cont coumadin and asa  pain control, titrate to PO  PT OOB    plan of care dw patient, NP

## 2018-04-25 NOTE — PROGRESS NOTE ADULT - SUBJECTIVE AND OBJECTIVE BOX
Patient is a 77y old  Female who presents with a chief complaint of fever (11 Apr 2018 14:25)  pt seen and examined at bedside   SUBJECTIVE/OVERNIGHT events noted, feels ok  no new complaints    Vital Signs Last 24 Hrs  T(C): 36.3 (25 Apr 2018 13:49), Max: 37.1 (24 Apr 2018 21:29)  T(F): 97.3 (25 Apr 2018 13:49), Max: 98.7 (24 Apr 2018 21:29)  HR: 95 (25 Apr 2018 13:49) (84 - 95)  BP: 139/83 (25 Apr 2018 13:49) (125/79 - 145/79)  BP(mean): --  RR: 18 (25 Apr 2018 13:49) (16 - 18)  SpO2: 97% (25 Apr 2018 13:49) (95% - 99%)  CAPILLARY BLOOD GLUCOSE        MEDICATIONS  (STANDING):  ALPRAZolam 0.25 milliGRAM(s) Oral once  ascorbic acid 500 milliGRAM(s) Oral daily  aspirin enteric coated 81 milliGRAM(s) Oral daily  buDESOnide 160 MICROgram(s)/formoterol 4.5 MICROgram(s) Inhaler 2 Puff(s) Inhalation two times a day  calcium carbonate 1250 mG + Vitamin D (OsCal 500 + D) 1 Tablet(s) Oral three times a day  DAPTOmycin IVPB 800 milliGRAM(s) IV Intermittent every 24 hours  docusate sodium 100 milliGRAM(s) Oral three times a day  ferrous    sulfate 325 milliGRAM(s) Oral daily  folic acid 1 milliGRAM(s) Oral daily  melatonin 3 milliGRAM(s) Oral at bedtime  multivitamin 1 Tablet(s) Oral daily  nystatin    Suspension 982195 Unit(s) Oral every 6 hours  pantoprazole    Tablet 40 milliGRAM(s) Oral before breakfast  polyethylene glycol 3350 17 Gram(s) Oral daily  povidone iodine 10% Solution 1 Application(s) Topical three times a day  silver sulfADIAZINE 1% Cream 1 Application(s) Topical daily  sodium chloride 0.9%. 1000 milliLiter(s) (75 mL/Hr) IV Continuous <Continuous>  tiotropium 18 MICROgram(s) Capsule 1 Capsule(s) Inhalation daily  warfarin 0.5 milliGRAM(s) Oral once  zinc oxide 20% Ointment 1 Application(s) Topical daily    MEDICATIONS  (PRN):  acetaminophen   Tablet 650 milliGRAM(s) Oral every 6 hours PRN For Temp greater than 38 C (100.4 F)  acetaminophen   Tablet. 650 milliGRAM(s) Oral every 6 hours PRN Mild Pain (1 - 3)  HYDROmorphone  Injectable 0.5 milliGRAM(s) IV Push every 6 hours PRN Severe Pain (7 - 10)  lidocaine 2% Viscous 10 milliLiter(s) Swish and Spit every 6 hours PRN Mouth Sores  oxyCODONE    IR 2.5 milliGRAM(s) Oral every 4 hours PRN Moderate Pain (4 - 6)  senna 2 Tablet(s) Oral at bedtime PRN Constipation    PHYSICAL EXAM  General : comfortable, not in any acute distress  Neck : supple, no LAD, no JVD  Eyes : EOMI, PERRLA, conjunctiva : no icterus, no pallor  MM moist, no pharyngeal erythema/exudates  Pulmonary: clear to auscultation bilateral lung fields, no crackles/rhonchi/wheezing,   CVS : S1 S2,rate normal-,rhythm-regular, no murmurs, no abnormal sounds  Gastrointestinal: soft, non tender, non distended, no organomegaly , bowel sounds audible  Extremities: rt LE thigh and knee dressing  Neuro: AAOx3, no focal deficits  Skin: no rash  LABS                        9.1    10.8  )-----------( 275      ( 25 Apr 2018 06:24 )             28.3     04-24    135  |  97  |  8   ----------------------------<  107<H>  3.5   |  27  |  0.64    Ca    8.5      24 Apr 2018 07:13        RADIOLOGY and IMAGING reviewed: yes  Consultant notes reviewed : yes  Care discussed with Consultants/other providers :

## 2018-04-25 NOTE — PROGRESS NOTE ADULT - SUBJECTIVE AND OBJECTIVE BOX
pain controlled, awake and alert  wbc stable and much improved, no fevers>1 week    RLE  thigh swelling continues to improve and soften, no erythema, eschar stable, no further exudate  surgical wound intact with stable eschar, some separation of eschar medially from skin, no further exudate  no surrounding erythema around wound, minimal swelling  5/5 ta/ehl/gcs  silt l4-s1  2+ dp pulse

## 2018-04-25 NOTE — PROGRESS NOTE ADULT - SUBJECTIVE AND OBJECTIVE BOX
CC: f/u for MRSA Rt PJI    Patient reports desire for rehab.Still on narcotics for dressing changes.Appreciate Dr Bass's note and plastics f/u.    REVIEW OF SYSTEMS:  All other review of systems negative (Comprehensive ROS): leg pain, limited mobility    Antimicrobials Day # day 32/41 :  DAPTOmycin IVPB 800 milliGRAM(s) IV Intermittent every 24 hours  nystatin    Suspension 328730 Unit(s) Oral every 6 hours    Other Medications Reviewed    T(F): 97.3 (04-25-18 @ 13:49), Max: 98.7 (04-24-18 @ 21:29)  HR: 95 (04-25-18 @ 13:49)  BP: 139/83 (04-25-18 @ 13:49)  RR: 18 (04-25-18 @ 13:49)  SpO2: 97% (04-25-18 @ 13:49)  Wt(kg): --    PHYSICAL EXAM:  General: alert, no acute distress  Eyes:  anicteric, no conjunctival injection, no discharge  Oropharynx: no lesions or injection 	  Neck: supple, without adenopathy  Lungs: clear to auscultation, poor inspiratory effert  Heart: regular rate and rhythm; no murmur, rubs or gallops  Abdomen: soft, nondistended, nontender, without mass or organomegaly  Skin: no lesions  Extremities: no clubbing, cyanosis,+ edema  Neurologic: alert, oriented, moves all extremities  Rt leg immobilized, proximal thigh region is dry, stable escar, no purulence  LAB RESULTS:                        9.1    10.8  )-----------( 275      ( 25 Apr 2018 06:24 )             28.3     04-24    135  |  97  |  8   ----------------------------<  107<H>  3.5   |  27  |  0.64    Ca    8.5      24 Apr 2018 07:13          MICROBIOLOGY:  RECENT CULTURES:      RADIOLOGY REVIEWED:

## 2018-04-26 LAB
ANION GAP SERPL CALC-SCNC: 10 MMOL/L — SIGNIFICANT CHANGE UP (ref 5–17)
BASOPHILS # BLD AUTO: 0.04 K/UL — SIGNIFICANT CHANGE UP (ref 0–0.2)
BASOPHILS NFR BLD AUTO: 0.4 % — SIGNIFICANT CHANGE UP (ref 0–2)
BUN SERPL-MCNC: 7 MG/DL — SIGNIFICANT CHANGE UP (ref 7–23)
CALCIUM SERPL-MCNC: 8.7 MG/DL — SIGNIFICANT CHANGE UP (ref 8.4–10.5)
CHLORIDE SERPL-SCNC: 97 MMOL/L — SIGNIFICANT CHANGE UP (ref 96–108)
CO2 SERPL-SCNC: 29 MMOL/L — SIGNIFICANT CHANGE UP (ref 22–31)
CREAT SERPL-MCNC: 0.63 MG/DL — SIGNIFICANT CHANGE UP (ref 0.5–1.3)
EOSINOPHIL # BLD AUTO: 0.32 K/UL — SIGNIFICANT CHANGE UP (ref 0–0.5)
EOSINOPHIL NFR BLD AUTO: 3.3 % — SIGNIFICANT CHANGE UP (ref 0–6)
GLUCOSE SERPL-MCNC: 95 MG/DL — SIGNIFICANT CHANGE UP (ref 70–99)
HCT VFR BLD CALC: 28.4 % — LOW (ref 34.5–45)
HGB BLD-MCNC: 8.8 G/DL — LOW (ref 11.5–15.5)
IMM GRANULOCYTES NFR BLD AUTO: 0.7 % — SIGNIFICANT CHANGE UP (ref 0–1.5)
INR BLD: 2.53 RATIO — HIGH (ref 0.88–1.16)
LYMPHOCYTES # BLD AUTO: 1.42 K/UL — SIGNIFICANT CHANGE UP (ref 1–3.3)
LYMPHOCYTES # BLD AUTO: 14.7 % — SIGNIFICANT CHANGE UP (ref 13–44)
MCHC RBC-ENTMCNC: 27.9 PG — SIGNIFICANT CHANGE UP (ref 27–34)
MCHC RBC-ENTMCNC: 31 GM/DL — LOW (ref 32–36)
MCV RBC AUTO: 90.2 FL — SIGNIFICANT CHANGE UP (ref 80–100)
MONOCYTES # BLD AUTO: 0.71 K/UL — SIGNIFICANT CHANGE UP (ref 0–0.9)
MONOCYTES NFR BLD AUTO: 7.3 % — SIGNIFICANT CHANGE UP (ref 2–14)
NEUTROPHILS # BLD AUTO: 7.11 K/UL — SIGNIFICANT CHANGE UP (ref 1.8–7.4)
NEUTROPHILS NFR BLD AUTO: 73.6 % — SIGNIFICANT CHANGE UP (ref 43–77)
PLATELET # BLD AUTO: 281 K/UL — SIGNIFICANT CHANGE UP (ref 150–400)
POTASSIUM SERPL-MCNC: 3.4 MMOL/L — LOW (ref 3.5–5.3)
POTASSIUM SERPL-SCNC: 3.4 MMOL/L — LOW (ref 3.5–5.3)
PROTHROM AB SERPL-ACNC: 29.1 SEC — HIGH (ref 10–13.1)
RBC # BLD: 3.15 M/UL — LOW (ref 3.8–5.2)
RBC # FLD: 17 % — HIGH (ref 10.3–14.5)
SODIUM SERPL-SCNC: 136 MMOL/L — SIGNIFICANT CHANGE UP (ref 135–145)
WBC # BLD: 9.67 K/UL — SIGNIFICANT CHANGE UP (ref 3.8–10.5)
WBC # FLD AUTO: 9.67 K/UL — SIGNIFICANT CHANGE UP (ref 3.8–10.5)

## 2018-04-26 PROCEDURE — 99232 SBSQ HOSP IP/OBS MODERATE 35: CPT

## 2018-04-26 RX ORDER — POTASSIUM CHLORIDE 20 MEQ
10 PACKET (EA) ORAL ONCE
Qty: 0 | Refills: 0 | Status: COMPLETED | OUTPATIENT
Start: 2018-04-26 | End: 2018-04-26

## 2018-04-26 RX ORDER — POTASSIUM CHLORIDE 20 MEQ
40 PACKET (EA) ORAL ONCE
Qty: 0 | Refills: 0 | Status: DISCONTINUED | OUTPATIENT
Start: 2018-04-26 | End: 2018-04-26

## 2018-04-26 RX ORDER — WARFARIN SODIUM 2.5 MG/1
1 TABLET ORAL ONCE
Qty: 0 | Refills: 0 | Status: COMPLETED | OUTPATIENT
Start: 2018-04-26 | End: 2018-04-26

## 2018-04-26 RX ADMIN — HYDROMORPHONE HYDROCHLORIDE 0.5 MILLIGRAM(S): 2 INJECTION INTRAMUSCULAR; INTRAVENOUS; SUBCUTANEOUS at 23:22

## 2018-04-26 RX ADMIN — WARFARIN SODIUM 1 MILLIGRAM(S): 2.5 TABLET ORAL at 22:33

## 2018-04-26 RX ADMIN — Medication 100 MILLIEQUIVALENT(S): at 20:19

## 2018-04-26 RX ADMIN — Medication 500000 UNIT(S): at 09:15

## 2018-04-26 RX ADMIN — PANTOPRAZOLE SODIUM 40 MILLIGRAM(S): 20 TABLET, DELAYED RELEASE ORAL at 05:13

## 2018-04-26 RX ADMIN — Medication 100 MILLIGRAM(S): at 13:21

## 2018-04-26 RX ADMIN — Medication 500000 UNIT(S): at 17:51

## 2018-04-26 RX ADMIN — Medication 81 MILLIGRAM(S): at 13:22

## 2018-04-26 RX ADMIN — OXYCODONE HYDROCHLORIDE 2.5 MILLIGRAM(S): 5 TABLET ORAL at 11:04

## 2018-04-26 RX ADMIN — HYDROMORPHONE HYDROCHLORIDE 0.5 MILLIGRAM(S): 2 INJECTION INTRAMUSCULAR; INTRAVENOUS; SUBCUTANEOUS at 23:52

## 2018-04-26 RX ADMIN — ZINC OXIDE 1 APPLICATION(S): 200 OINTMENT TOPICAL at 13:23

## 2018-04-26 RX ADMIN — Medication 500 MILLIGRAM(S): at 13:21

## 2018-04-26 RX ADMIN — Medication 1 TABLET(S): at 05:13

## 2018-04-26 RX ADMIN — Medication 1 MILLIGRAM(S): at 13:20

## 2018-04-26 RX ADMIN — Medication 1 TABLET(S): at 13:20

## 2018-04-26 RX ADMIN — HYDROMORPHONE HYDROCHLORIDE 0.5 MILLIGRAM(S): 2 INJECTION INTRAMUSCULAR; INTRAVENOUS; SUBCUTANEOUS at 13:45

## 2018-04-26 RX ADMIN — BUDESONIDE AND FORMOTEROL FUMARATE DIHYDRATE 2 PUFF(S): 160; 4.5 AEROSOL RESPIRATORY (INHALATION) at 17:51

## 2018-04-26 RX ADMIN — Medication 1 APPLICATION(S): at 05:13

## 2018-04-26 RX ADMIN — DAPTOMYCIN 132 MILLIGRAM(S): 500 INJECTION, POWDER, LYOPHILIZED, FOR SOLUTION INTRAVENOUS at 09:15

## 2018-04-26 RX ADMIN — Medication 1 APPLICATION(S): at 22:34

## 2018-04-26 RX ADMIN — TIOTROPIUM BROMIDE 1 CAPSULE(S): 18 CAPSULE ORAL; RESPIRATORY (INHALATION) at 13:19

## 2018-04-26 RX ADMIN — HYDROMORPHONE HYDROCHLORIDE 0.5 MILLIGRAM(S): 2 INJECTION INTRAMUSCULAR; INTRAVENOUS; SUBCUTANEOUS at 13:17

## 2018-04-26 RX ADMIN — Medication 1 APPLICATION(S): at 13:21

## 2018-04-26 RX ADMIN — Medication 325 MILLIGRAM(S): at 13:20

## 2018-04-26 RX ADMIN — Medication 500000 UNIT(S): at 05:13

## 2018-04-26 RX ADMIN — Medication 3 MILLIGRAM(S): at 22:33

## 2018-04-26 RX ADMIN — BUDESONIDE AND FORMOTEROL FUMARATE DIHYDRATE 2 PUFF(S): 160; 4.5 AEROSOL RESPIRATORY (INHALATION) at 05:34

## 2018-04-26 RX ADMIN — Medication 1 APPLICATION(S): at 13:22

## 2018-04-26 RX ADMIN — HYDROMORPHONE HYDROCHLORIDE 0.5 MILLIGRAM(S): 2 INJECTION INTRAMUSCULAR; INTRAVENOUS; SUBCUTANEOUS at 06:15

## 2018-04-26 RX ADMIN — Medication 1 TABLET(S): at 22:33

## 2018-04-26 RX ADMIN — OXYCODONE HYDROCHLORIDE 2.5 MILLIGRAM(S): 5 TABLET ORAL at 10:04

## 2018-04-26 RX ADMIN — Medication 100 MILLIGRAM(S): at 05:13

## 2018-04-26 RX ADMIN — HYDROMORPHONE HYDROCHLORIDE 0.5 MILLIGRAM(S): 2 INJECTION INTRAMUSCULAR; INTRAVENOUS; SUBCUTANEOUS at 06:00

## 2018-04-26 RX ADMIN — OXYCODONE HYDROCHLORIDE 2.5 MILLIGRAM(S): 5 TABLET ORAL at 18:54

## 2018-04-26 RX ADMIN — Medication 500000 UNIT(S): at 22:33

## 2018-04-26 NOTE — PROGRESS NOTE ADULT - ATTENDING COMMENTS
as above-  Pulm relatively stable-atelectasis, prior PE, asthma, cardiac Dz  Inhaler regimen as outlined above  DVT rx /prophylaxis  ID follow up of ABX  (IV for 42 days total then PO minocycline) PT evaluation and Psych evaluation    ortho/plastics follow up--?NIKI Kumar MD-Pulmonary   [phone] as above-  Pulm relatively stable-atelectasis, prior PE, asthma, cardiac Dz  Inhaler regimen as outlined above  DVT rx /prophylaxis  ID follow up of ABX  (IV for 42 days total then PO minocycline) PT evaluation and Psych evaluation    ortho/plastics follow up--?AKA-conservative rx    Everett Kumar MD-Pulmonary   [phone]

## 2018-04-26 NOTE — PROGRESS NOTE ADULT - ATTENDING COMMENTS
plan to dc to rehab probably Friday-pending plastics wound care plan    Reina Murphy MD ( [facility])  [phone]

## 2018-04-26 NOTE — PROGRESS NOTE ADULT - SUBJECTIVE AND OBJECTIVE BOX
CHIEF COMPLAINT:    Interval Events:    REVIEW OF SYSTEMS:  Constitutional: No fevers or chills. No weight loss. No fatigue or generalized malaise.  Eyes: No itching or discharge from the eyes  ENT: No ear pain. No ear discharge. No nasal congestion. No post nasal drip. No epistaxis. No throat pain. No sore throat. No difficulty swallowing.   CV: No chest pain. No palpitations. No lightheadedness or dizziness.   Resp: No dyspnea at rest. No dyspnea on exertion. No orthopnea. No wheezing. No cough. No stridor. No sputum production. No chest pain with respiration.  GI: No nausea. No vomiting. No diarrhea.  MSK: No joint pain or pain in any extremities  Integumentary: No skin lesions. No pedal edema.  Neurological: No gross motor weakness. No sensory changes.  [ ] All other systems negative  [ ] Unable to assess ROS because ________    OBJECTIVE:  ICU Vital Signs Last 24 Hrs  T(C): 36.2 (25 Apr 2018 19:46), Max: 36.7 (25 Apr 2018 10:51)  T(F): 97.2 (25 Apr 2018 19:46), Max: 98.1 (25 Apr 2018 10:51)  HR: 99 (25 Apr 2018 19:46) (84 - 99)  BP: 118/74 (25 Apr 2018 19:46) (118/74 - 139/83)  BP(mean): --  ABP: --  ABP(mean): --  RR: 18 (25 Apr 2018 19:46) (16 - 18)  SpO2: 96% (25 Apr 2018 19:46) (96% - 99%)        04-24 @ 07:01 - 04-25 @ 07:00  --------------------------------------------------------  IN: 340 mL / OUT: 825 mL / NET: -485 mL    04-25 @ 07:01  - 04-26 @ 05:19  --------------------------------------------------------  IN: 530 mL / OUT: 0 mL / NET: 530 mL      CAPILLARY BLOOD GLUCOSE          PHYSICAL EXAM:  General: Awake, alert, oriented X 3.   HEENT: Atraumatic, normocephalic.                 Mallampatti Grade                 No nasal congestion.                No tonsillar or pharyngeal exudates.  Lymph Nodes: No palpable lymphadenopathy  Neck: No JVD. No carotid bruit.   Respiratory: Normal chest expansion                         Normal percussion                         Normal and equal air entry                         No wheeze, rhonchi or rales.  Cardiovascular: S1 S2 normal. No murmurs, rubs or gallops.   Abdomen: Soft, non-tender, non-distended. No organomegaly.  Extremities: Warm to touch. Peripheral pulse palpable. No pedal edema.   Skin: No rashes or skin lesions  Neurological: Motor and sensory examination equal and normal in all four extremities.  Psychiatry: Appropriate mood and affect.    HOSPITAL MEDICATIONS:  MEDICATIONS  (STANDING):  ALPRAZolam 0.25 milliGRAM(s) Oral once  ascorbic acid 500 milliGRAM(s) Oral daily  aspirin enteric coated 81 milliGRAM(s) Oral daily  buDESOnide 160 MICROgram(s)/formoterol 4.5 MICROgram(s) Inhaler 2 Puff(s) Inhalation two times a day  calcium carbonate 1250 mG + Vitamin D (OsCal 500 + D) 1 Tablet(s) Oral three times a day  DAPTOmycin IVPB 800 milliGRAM(s) IV Intermittent every 24 hours  docusate sodium 100 milliGRAM(s) Oral three times a day  ferrous    sulfate 325 milliGRAM(s) Oral daily  folic acid 1 milliGRAM(s) Oral daily  melatonin 3 milliGRAM(s) Oral at bedtime  multivitamin 1 Tablet(s) Oral daily  nystatin    Suspension 704277 Unit(s) Oral every 6 hours  pantoprazole    Tablet 40 milliGRAM(s) Oral before breakfast  polyethylene glycol 3350 17 Gram(s) Oral daily  povidone iodine 10% Solution 1 Application(s) Topical three times a day  silver sulfADIAZINE 1% Cream 1 Application(s) Topical daily  sodium chloride 0.9%. 1000 milliLiter(s) (75 mL/Hr) IV Continuous <Continuous>  tiotropium 18 MICROgram(s) Capsule 1 Capsule(s) Inhalation daily  zinc oxide 20% Ointment 1 Application(s) Topical daily    MEDICATIONS  (PRN):  acetaminophen   Tablet 650 milliGRAM(s) Oral every 6 hours PRN For Temp greater than 38 C (100.4 F)  acetaminophen   Tablet. 650 milliGRAM(s) Oral every 6 hours PRN Mild Pain (1 - 3)  HYDROmorphone  Injectable 0.5 milliGRAM(s) IV Push every 6 hours PRN Severe Pain (7 - 10)  lidocaine 2% Viscous 10 milliLiter(s) Swish and Spit every 6 hours PRN Mouth Sores  oxyCODONE    IR 2.5 milliGRAM(s) Oral every 4 hours PRN Moderate Pain (4 - 6)  senna 2 Tablet(s) Oral at bedtime PRN Constipation      LABS:                        9.1    10.8  )-----------( 275      ( 25 Apr 2018 06:24 )             28.3     04-24    135  |  97  |  8   ----------------------------<  107<H>  3.5   |  27  |  0.64    Ca    8.5      24 Apr 2018 07:13      PT/INR - ( 25 Apr 2018 06:24 )   PT: 26.9 sec;   INR: 2.42 ratio                   MICROBIOLOGY:     RADIOLOGY:  [ ] Reviewed and interpreted by me    Point of Care Ultrasound Findings:    PFT:    EKG: CHIEF COMPLAINT: depressed over all--no sob or cough    Interval Events: ID, plastics, ortho    REVIEW OF SYSTEMS:  Constitutional: No fevers or chills. No weight loss. + fatigue or generalized malaise.  Eyes: No itching or discharge from the eyes  ENT: No ear pain. No ear discharge. No nasal congestion. No post nasal drip. No epistaxis. No throat pain. No sore throat. No difficulty swallowing.   CV: No chest pain. No palpitations. No lightheadedness or dizziness.   Resp: No dyspnea at rest. No dyspnea on exertion. No orthopnea. No wheezing. No cough. No stridor. No sputum production. No chest pain with respiration.  GI: No nausea. No vomiting. No diarrhea.  MSK: No joint pain or pain in any extremities  Integumentary: No skin lesions. No pedal edema.  Neurological: No gross motor weakness-except RLE No sensory changes.  [+ ] All other systems negative  [ ] Unable to assess ROS because ________    OBJECTIVE:  ICU Vital Signs Last 24 Hrs  T(C): 36.2 (25 Apr 2018 19:46), Max: 36.7 (25 Apr 2018 10:51)  T(F): 97.2 (25 Apr 2018 19:46), Max: 98.1 (25 Apr 2018 10:51)  HR: 99 (25 Apr 2018 19:46) (84 - 99)  BP: 118/74 (25 Apr 2018 19:46) (118/74 - 139/83)  BP(mean): --  ABP: --  ABP(mean): --  RR: 18 (25 Apr 2018 19:46) (16 - 18)  SpO2: 96% (25 Apr 2018 19:46) (96% - 99%)        04-24 @ 07:01 - 04-25 @ 07:00  --------------------------------------------------------  IN: 340 mL / OUT: 825 mL / NET: -485 mL    04-25 @ 07:01 - 04-26 @ 05:19  --------------------------------------------------------  IN: 530 mL / OUT: 0 mL / NET: 530 mL      CAPILLARY BLOOD GLUCOSE          PHYSICAL EXAM: NAD on O2 NC  General: Awake, alert, oriented X 3.   HEENT: Atraumatic, normocephalic.                 Mallampatti Grade 3                No nasal congestion.                No tonsillar or pharyngeal exudates.  Lymph Nodes: No palpable lymphadenopathy  Neck: No JVD. No carotid bruit.   Respiratory: abnormal chest expansion-reduced BS bases                         Normal percussion                         Normal and equal air entry                         No wheeze, rhonchi or rales.  Cardiovascular: S1 S2 normal. No murmurs, rubs or gallops.   Abdomen: Soft, non-tender, non-distended. No organomegaly.  Extremities: Warm to touch. Peripheral pulse palpable. No pedal edema. right leg wound-wrapped  Skin: No rashes or skin lesions  Neurological: Motor and sensory examination equal and normal in all four extremities.  Psychiatry: Appropriate mood and affect.    HOSPITAL MEDICATIONS:  MEDICATIONS  (STANDING):  ALPRAZolam 0.25 milliGRAM(s) Oral once  ascorbic acid 500 milliGRAM(s) Oral daily  aspirin enteric coated 81 milliGRAM(s) Oral daily  buDESOnide 160 MICROgram(s)/formoterol 4.5 MICROgram(s) Inhaler 2 Puff(s) Inhalation two times a day  calcium carbonate 1250 mG + Vitamin D (OsCal 500 + D) 1 Tablet(s) Oral three times a day  DAPTOmycin IVPB 800 milliGRAM(s) IV Intermittent every 24 hours  docusate sodium 100 milliGRAM(s) Oral three times a day  ferrous    sulfate 325 milliGRAM(s) Oral daily  folic acid 1 milliGRAM(s) Oral daily  melatonin 3 milliGRAM(s) Oral at bedtime  multivitamin 1 Tablet(s) Oral daily  nystatin    Suspension 364662 Unit(s) Oral every 6 hours  pantoprazole    Tablet 40 milliGRAM(s) Oral before breakfast  polyethylene glycol 3350 17 Gram(s) Oral daily  povidone iodine 10% Solution 1 Application(s) Topical three times a day  silver sulfADIAZINE 1% Cream 1 Application(s) Topical daily  sodium chloride 0.9%. 1000 milliLiter(s) (75 mL/Hr) IV Continuous <Continuous>  tiotropium 18 MICROgram(s) Capsule 1 Capsule(s) Inhalation daily  zinc oxide 20% Ointment 1 Application(s) Topical daily    MEDICATIONS  (PRN):  acetaminophen   Tablet 650 milliGRAM(s) Oral every 6 hours PRN For Temp greater than 38 C (100.4 F)  acetaminophen   Tablet. 650 milliGRAM(s) Oral every 6 hours PRN Mild Pain (1 - 3)  HYDROmorphone  Injectable 0.5 milliGRAM(s) IV Push every 6 hours PRN Severe Pain (7 - 10)  lidocaine 2% Viscous 10 milliLiter(s) Swish and Spit every 6 hours PRN Mouth Sores  oxyCODONE    IR 2.5 milliGRAM(s) Oral every 4 hours PRN Moderate Pain (4 - 6)  senna 2 Tablet(s) Oral at bedtime PRN Constipation      LABS:                        9.1    10.8  )-----------( 275      ( 25 Apr 2018 06:24 )             28.3     04-24    135  |  97  |  8   ----------------------------<  107<H>  3.5   |  27  |  0.64    Ca    8.5      24 Apr 2018 07:13      PT/INR - ( 25 Apr 2018 06:24 )   PT: 26.9 sec;   INR: 2.42 ratio                   MICROBIOLOGY:     RADIOLOGY:  [ ] Reviewed and interpreted by me    Point of Care Ultrasound Findings:    PFT:    EKG:

## 2018-04-26 NOTE — PROGRESS NOTE ADULT - SUBJECTIVE AND OBJECTIVE BOX
Patient is a 77y old  Female who presents with a chief complaint of fever (11 Apr 2018 14:25)  pt seen and examined at bedside   SUBJECTIVE/OVERNIGHT events feels well    Vital Signs Last 24 Hrs  T(C): 36.6 (26 Apr 2018 14:41), Max: 36.8 (26 Apr 2018 06:50)  T(F): 97.8 (26 Apr 2018 14:41), Max: 98.2 (26 Apr 2018 06:50)  HR: 86 (26 Apr 2018 14:41) (86 - 99)  BP: 126/78 (26 Apr 2018 14:41) (118/74 - 145/77)  BP(mean): --  RR: 18 (26 Apr 2018 14:41) (18 - 18)  SpO2: 95% (26 Apr 2018 14:41) (95% - 96%)  CAPILLARY BLOOD GLUCOSE        MEDICATIONS  (STANDING):  ALPRAZolam 0.25 milliGRAM(s) Oral once  ascorbic acid 500 milliGRAM(s) Oral daily  aspirin enteric coated 81 milliGRAM(s) Oral daily  buDESOnide 160 MICROgram(s)/formoterol 4.5 MICROgram(s) Inhaler 2 Puff(s) Inhalation two times a day  calcium carbonate 1250 mG + Vitamin D (OsCal 500 + D) 1 Tablet(s) Oral three times a day  DAPTOmycin IVPB 800 milliGRAM(s) IV Intermittent every 24 hours  docusate sodium 100 milliGRAM(s) Oral three times a day  ferrous    sulfate 325 milliGRAM(s) Oral daily  folic acid 1 milliGRAM(s) Oral daily  melatonin 3 milliGRAM(s) Oral at bedtime  multivitamin 1 Tablet(s) Oral daily  nystatin    Suspension 779302 Unit(s) Oral every 6 hours  pantoprazole    Tablet 40 milliGRAM(s) Oral before breakfast  polyethylene glycol 3350 17 Gram(s) Oral daily  povidone iodine 10% Solution 1 Application(s) Topical three times a day  silver sulfADIAZINE 1% Cream 1 Application(s) Topical daily  sodium chloride 0.9%. 1000 milliLiter(s) (75 mL/Hr) IV Continuous <Continuous>  tiotropium 18 MICROgram(s) Capsule 1 Capsule(s) Inhalation daily  warfarin 1 milliGRAM(s) Oral once  zinc oxide 20% Ointment 1 Application(s) Topical daily    MEDICATIONS  (PRN):  acetaminophen   Tablet 650 milliGRAM(s) Oral every 6 hours PRN For Temp greater than 38 C (100.4 F)  acetaminophen   Tablet. 650 milliGRAM(s) Oral every 6 hours PRN Mild Pain (1 - 3)  HYDROmorphone  Injectable 0.5 milliGRAM(s) IV Push every 6 hours PRN Severe Pain (7 - 10)  lidocaine 2% Viscous 10 milliLiter(s) Swish and Spit every 6 hours PRN Mouth Sores  oxyCODONE    IR 2.5 milliGRAM(s) Oral every 4 hours PRN Moderate Pain (4 - 6)  senna 2 Tablet(s) Oral at bedtime PRN Constipation    PHYSICAL EXAM  General : comfortable, not in any acute distress  Neck : supple, no LAD, no JVD  Eyes : EOMI, PERRLA, conjunctiva : no icterus, no pallor  MM moist, no pharyngeal erythema/exudates  Pulmonary: clear to auscultation bilateral lung fields, no crackles/rhonchi/wheezing,   CVS : S1 S2,rate normal-,rhythm-regular, no murmurs, no abnormal sounds  Gastrointestinal: soft, non tender, non distended, no organomegaly , bowel sounds audible  Extremities: Rt thigh dressing  Neuro: AAOx3, no focal deficits  Skin: no rash  LABS                        8.8    9.67  )-----------( 281      ( 26 Apr 2018 07:47 )             28.4     04-26    136  |  97  |  7   ----------------------------<  95  3.4<L>   |  29  |  0.63    Ca    8.7      26 Apr 2018 06:41        RADIOLOGY and IMAGING reviewed: yes  Consultant notes reviewed : yes  Care discussed with Consultants/other providers :

## 2018-04-26 NOTE — PROGRESS NOTE ADULT - ASSESSMENT
no plan for surgical intervention per family and patient preference  local wound care per plastics  continue IV antibiotic treatment per infectious disease   encourage her to spend majority of bed oob in bedside chair as able	  PT to help mobilize, she must remain 50% wb on the RLE and NO KNEE MOTION allowed, knee immobilizer if oob  coumadin, inr goal 2-3  continue to optimize nutrition  continue to involve psych  Follow up with Dr. Bass in 2 weeks after discharge.    Nate Bass MD  Attending Orthopedic Surgeon no plan for surgical intervention per family and patient preference  local wound care per plastics, Dr. Mg and his team plan local wound debridement to thigh fat necrosis likely 4/27  continue IV antibiotic treatment per infectious disease   encourage her to spend majority of bed oob in bedside chair as able	  PT to help mobilize, she must remain 50% wb on the RLE and NO KNEE MOTION allowed, knee immobilizer if oob  coumadin, inr goal 2-3  continue to optimize nutrition  continue to involve psych  Follow up with Dr. Bass in 2 weeks after discharge.    I spoke with Mr. Rdz and Ms. Rdz and their aid regarding the fatty necrosis on the thigh that we have been monitoring. Now with separation of eschar from surrounding tissue. PRS to plan local wound debridement likely at bedside. No evidence of new infection at this time, WBC normal and no fevers for >10 days now.	    Nate Bass MD  Attending Orthopedic Surgeon

## 2018-04-26 NOTE — PROGRESS NOTE ADULT - ASSESSMENT
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    She returned to rehab on IV Dapto, but readmitted with fever, leukocytosis and found to have multifocal pneumonia.   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    She returned to rehab on IV Dapto, but readmitted with fever, leukocytosis and found to have multifocal pneumonia.   Completed 7 days vanco and aztreonam  Dependent edema and thigh hematoma, no discreet collection on Sono   Afebrile, blood cultures negative, leukocytosis has moderated  CT: no drainable collections noted  Patient is refusing AKA, conservative management planned  Plan:  Continue Dapto monotherapy targeting MRSA, day 33/42   Follow temps and CBC/dif  Debridement per plastics and ortho  Will check CPK in AM,was 19 last week  [place] is guarded, appreciate detailed notes of Dr Bass  Discharge planning as per plastics and medicine

## 2018-04-26 NOTE — PROGRESS NOTE ADULT - PROBLEM SELECTOR PLAN 1
multifactorial--asthma, obesity, CAD, prior PE, likely aspiration PNA--clearing multifactorial--asthma, obesity, CAD, prior PE, likely aspiration PNA--clearing--clinicly better

## 2018-04-26 NOTE — PROGRESS NOTE ADULT - SUBJECTIVE AND OBJECTIVE BOX
CC: f/u for Rt PJI and pneumonia    Patient reports frustration with underlying condition    REVIEW OF SYSTEMS:  All other review of systems negative (Comprehensive ROS)    Antimicrobials Day #  :day 33/42  DAPTOmycin IVPB 800 milliGRAM(s) IV Intermittent every 24 hours  nystatin    Suspension 523788 Unit(s) Oral every 6 hours    Other Medications Reviewed    T(F): 97.9 (04-26-18 @ 16:08), Max: 98.2 (04-26-18 @ 06:50)  HR: 92 (04-26-18 @ 16:08)  BP: 110/69 (04-26-18 @ 16:08)  RR: 19 (04-26-18 @ 16:08)  SpO2: 97% (04-26-18 @ 16:08)  Wt(kg): --    PHYSICAL EXAM:  General: alert, no acute distress  Eyes:  anicteric, no conjunctival injection, no discharge  Oropharynx: no lesions or injection 	  Neck: supple, without adenopathy  Lungs: clear to auscultation  Heart: regular rate and rhythm; no murmur, rubs or gallops  Abdomen: soft, nondistended, nontender, without mass or organomegaly  Skin: no lesions  Extremities: no clubbing, cyanosis, + edema  Neurologic: alert, oriented, limited leg movement  Rt knee and thigh with stable eschars, no pus or cellulitis    LAB RESULTS:                        8.8    9.67  )-----------( 281      ( 26 Apr 2018 07:47 )             28.4     04-26    136  |  97  |  7   ----------------------------<  95  3.4<L>   |  29  |  0.63    Ca    8.7      26 Apr 2018 06:41          MICROBIOLOGY:  RECENT CULTURES:      RADIOLOGY REVIEWED:

## 2018-04-26 NOTE — PROGRESS NOTE ADULT - SUBJECTIVE AND OBJECTIVE BOX
pain controlled, awake and alert  wbc now wnl   no fevers>1 week    RLE  thigh swelling continues to improve and soften, no erythema, eschar stable, no further exudate, dressed by prs  surgical wound intact with stable eschar, some separation of eschar medially from skin, no further exudate, dressed by prs  no surrounding erythema around wound, minimal swelling  5/5 ta/ehl/gcs  silt l4-s1  2+ dp pulse pain controlled, awake and alert  wbc now wnl   no fevers>1 week    RLE  thigh swelling continues to improve and soften, no erythema, eschar stable but starting to separate from surrouding skin, has fat necrosis, dressed per prs instructions  surgical wound intact with stable eschar, some separation of eschar medially from skin, no further exudate, dressed per prs instructions  no surrounding erythema around wound, minimal swelling  5/5 ta/ehl/gcs  silt l4-s1  2+ dp pulse

## 2018-04-26 NOTE — PROGRESS NOTE ADULT - SUBJECTIVE AND OBJECTIVE BOX
Plastic Surgery Progress Note    S: Patient seen and examined. no new events    Vital Signs Last 24 Hrs  T(C): 36.8 (26 Apr 2018 06:50), Max: 36.8 (26 Apr 2018 06:50)  T(F): 98.2 (26 Apr 2018 06:50), Max: 98.2 (26 Apr 2018 06:50)  HR: 89 (26 Apr 2018 06:50) (84 - 99)  BP: 145/77 (26 Apr 2018 06:50) (118/74 - 145/77)  BP(mean): --  RR: 18 (26 Apr 2018 06:50) (16 - 18)  SpO2: 96% (26 Apr 2018 06:50) (96% - 99%)  I&O's Detail    25 Apr 2018 07:01  -  26 Apr 2018 07:00  --------------------------------------------------------  IN:    IV PiggyBack: 50 mL    Oral Fluid: 480 mL  Total IN: 530 mL    OUT:  Total OUT: 0 mL    Total NET: 530 mL      26 Apr 2018 07:01  -  26 Apr 2018 10:37  --------------------------------------------------------  IN:    IV PiggyBack: 50 mL    Oral Fluid: 360 mL  Total IN: 410 mL    OUT:  Total OUT: 0 mL    Total NET: 410 mL          Physical Exam:  General: Awake and alert, NAD  RLE: Knee eschar stable,  proximal incision intact.  most proximal wound evolving, recently covered with silvadene.

## 2018-04-26 NOTE — PROGRESS NOTE ADULT - ASSESSMENT
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    # Fever, resolved  # Recent septic Rt TKR - abx spacer exchanged and plastics complex wound closure  # Aspiration PNA, resolved  # Supratherapeutic INR/coagulopathy, resolved  # PMR on chronic steroids  # thigh hematoma, leg wound    Plan:  remains afebrile, cont Dapto per ID until 5/5, BC NTD, appreciate ID recs  h/h stable  appreciate plastics surgery recs, local wound care, to stabilize wound before considering rehab  will cont coumadin and asa  pain control, titrate to PO  PT OOB    plan of care dw patient, NP

## 2018-04-27 LAB
ANION GAP SERPL CALC-SCNC: 12 MMOL/L — SIGNIFICANT CHANGE UP (ref 5–17)
BUN SERPL-MCNC: 8 MG/DL — SIGNIFICANT CHANGE UP (ref 7–23)
CALCIUM SERPL-MCNC: 8.7 MG/DL — SIGNIFICANT CHANGE UP (ref 8.4–10.5)
CHLORIDE SERPL-SCNC: 96 MMOL/L — SIGNIFICANT CHANGE UP (ref 96–108)
CK SERPL-CCNC: 27 U/L — SIGNIFICANT CHANGE UP (ref 25–170)
CO2 SERPL-SCNC: 28 MMOL/L — SIGNIFICANT CHANGE UP (ref 22–31)
CREAT SERPL-MCNC: 0.62 MG/DL — SIGNIFICANT CHANGE UP (ref 0.5–1.3)
GLUCOSE SERPL-MCNC: 101 MG/DL — HIGH (ref 70–99)
INR BLD: 2.42 RATIO — HIGH (ref 0.88–1.16)
POTASSIUM SERPL-MCNC: 3.8 MMOL/L — SIGNIFICANT CHANGE UP (ref 3.5–5.3)
POTASSIUM SERPL-SCNC: 3.8 MMOL/L — SIGNIFICANT CHANGE UP (ref 3.5–5.3)
PROTHROM AB SERPL-ACNC: 27.8 SEC — HIGH (ref 10–13.1)
SODIUM SERPL-SCNC: 136 MMOL/L — SIGNIFICANT CHANGE UP (ref 135–145)

## 2018-04-27 PROCEDURE — 99232 SBSQ HOSP IP/OBS MODERATE 35: CPT

## 2018-04-27 RX ORDER — COLLAGENASE CLOSTRIDIUM HIST. 250 UNIT/G
1 OINTMENT (GRAM) TOPICAL
Qty: 0 | Refills: 0 | Status: COMPLETED | OUTPATIENT
Start: 2018-04-27 | End: 2018-04-29

## 2018-04-27 RX ORDER — SODIUM HYPOCHLORITE 0.125 %
1 SOLUTION, NON-ORAL MISCELLANEOUS
Qty: 0 | Refills: 0 | Status: COMPLETED | OUTPATIENT
Start: 2018-04-27 | End: 2018-04-30

## 2018-04-27 RX ORDER — WARFARIN SODIUM 2.5 MG/1
0.5 TABLET ORAL ONCE
Qty: 0 | Refills: 0 | Status: COMPLETED | OUTPATIENT
Start: 2018-04-27 | End: 2018-04-27

## 2018-04-27 RX ORDER — HYDROMORPHONE HYDROCHLORIDE 2 MG/ML
0.5 INJECTION INTRAMUSCULAR; INTRAVENOUS; SUBCUTANEOUS ONCE
Qty: 0 | Refills: 0 | Status: DISCONTINUED | OUTPATIENT
Start: 2018-04-27 | End: 2018-04-27

## 2018-04-27 RX ADMIN — Medication 1 APPLICATION(S): at 11:49

## 2018-04-27 RX ADMIN — Medication 1 APPLICATION(S): at 21:57

## 2018-04-27 RX ADMIN — Medication 1 TABLET(S): at 13:54

## 2018-04-27 RX ADMIN — Medication 1 TABLET(S): at 11:48

## 2018-04-27 RX ADMIN — BUDESONIDE AND FORMOTEROL FUMARATE DIHYDRATE 2 PUFF(S): 160; 4.5 AEROSOL RESPIRATORY (INHALATION) at 05:42

## 2018-04-27 RX ADMIN — TIOTROPIUM BROMIDE 1 CAPSULE(S): 18 CAPSULE ORAL; RESPIRATORY (INHALATION) at 11:48

## 2018-04-27 RX ADMIN — HYDROMORPHONE HYDROCHLORIDE 0.5 MILLIGRAM(S): 2 INJECTION INTRAMUSCULAR; INTRAVENOUS; SUBCUTANEOUS at 21:11

## 2018-04-27 RX ADMIN — HYDROMORPHONE HYDROCHLORIDE 0.5 MILLIGRAM(S): 2 INJECTION INTRAMUSCULAR; INTRAVENOUS; SUBCUTANEOUS at 14:45

## 2018-04-27 RX ADMIN — Medication 1 MILLIGRAM(S): at 11:48

## 2018-04-27 RX ADMIN — Medication 81 MILLIGRAM(S): at 11:48

## 2018-04-27 RX ADMIN — HYDROMORPHONE HYDROCHLORIDE 0.5 MILLIGRAM(S): 2 INJECTION INTRAMUSCULAR; INTRAVENOUS; SUBCUTANEOUS at 09:15

## 2018-04-27 RX ADMIN — Medication 1 TABLET(S): at 05:42

## 2018-04-27 RX ADMIN — POLYETHYLENE GLYCOL 3350 17 GRAM(S): 17 POWDER, FOR SOLUTION ORAL at 11:49

## 2018-04-27 RX ADMIN — PANTOPRAZOLE SODIUM 40 MILLIGRAM(S): 20 TABLET, DELAYED RELEASE ORAL at 05:43

## 2018-04-27 RX ADMIN — BUDESONIDE AND FORMOTEROL FUMARATE DIHYDRATE 2 PUFF(S): 160; 4.5 AEROSOL RESPIRATORY (INHALATION) at 17:28

## 2018-04-27 RX ADMIN — HYDROMORPHONE HYDROCHLORIDE 0.5 MILLIGRAM(S): 2 INJECTION INTRAMUSCULAR; INTRAVENOUS; SUBCUTANEOUS at 09:35

## 2018-04-27 RX ADMIN — HYDROMORPHONE HYDROCHLORIDE 0.5 MILLIGRAM(S): 2 INJECTION INTRAMUSCULAR; INTRAVENOUS; SUBCUTANEOUS at 06:38

## 2018-04-27 RX ADMIN — Medication 1 TABLET(S): at 21:55

## 2018-04-27 RX ADMIN — Medication 3 MILLIGRAM(S): at 21:56

## 2018-04-27 RX ADMIN — ZINC OXIDE 1 APPLICATION(S): 200 OINTMENT TOPICAL at 11:49

## 2018-04-27 RX ADMIN — Medication 325 MILLIGRAM(S): at 11:48

## 2018-04-27 RX ADMIN — Medication 100 MILLIGRAM(S): at 21:56

## 2018-04-27 RX ADMIN — Medication 500000 UNIT(S): at 21:56

## 2018-04-27 RX ADMIN — Medication 500000 UNIT(S): at 09:15

## 2018-04-27 RX ADMIN — WARFARIN SODIUM 0.5 MILLIGRAM(S): 2.5 TABLET ORAL at 21:56

## 2018-04-27 RX ADMIN — HYDROMORPHONE HYDROCHLORIDE 0.5 MILLIGRAM(S): 2 INJECTION INTRAMUSCULAR; INTRAVENOUS; SUBCUTANEOUS at 06:08

## 2018-04-27 RX ADMIN — DAPTOMYCIN 132 MILLIGRAM(S): 500 INJECTION, POWDER, LYOPHILIZED, FOR SOLUTION INTRAVENOUS at 08:08

## 2018-04-27 RX ADMIN — Medication 500 MILLIGRAM(S): at 11:48

## 2018-04-27 RX ADMIN — HYDROMORPHONE HYDROCHLORIDE 0.5 MILLIGRAM(S): 2 INJECTION INTRAMUSCULAR; INTRAVENOUS; SUBCUTANEOUS at 20:41

## 2018-04-27 RX ADMIN — Medication 1 APPLICATION(S): at 05:43

## 2018-04-27 RX ADMIN — Medication 500000 UNIT(S): at 05:41

## 2018-04-27 RX ADMIN — Medication 500000 UNIT(S): at 16:34

## 2018-04-27 RX ADMIN — Medication 1 APPLICATION(S): at 13:54

## 2018-04-27 RX ADMIN — HYDROMORPHONE HYDROCHLORIDE 0.5 MILLIGRAM(S): 2 INJECTION INTRAMUSCULAR; INTRAVENOUS; SUBCUTANEOUS at 14:29

## 2018-04-27 NOTE — PROGRESS NOTE ADULT - ATTENDING COMMENTS
plan to dc to rehab on hold-pending plastics wound care plan    Reina Murphy MD ( [facility])  [phone]

## 2018-04-27 NOTE — PROGRESS NOTE ADULT - ASSESSMENT
- cont local wound care for now - will call wound care consult - attending to speak with Dr. Hollins today  - recommend collagenase  - will cont to follow while in house.

## 2018-04-27 NOTE — PROGRESS NOTE ADULT - SUBJECTIVE AND OBJECTIVE BOX
pain controlled, awake and alert  afebrile    RLE  thigh with fat necrosis and  from surround skin, further demarkating, dressed by wound care and prs, no surrounding erythema  surgical wound intact with stable eschar, some separation of eschar medially from skin, no further exudate, dressed per prs instructions  no surrounding erythema around wound, minimal swelling  5/5 ta/ehl/gcs  silt l4-s1  2+ dp pulse

## 2018-04-27 NOTE — PROGRESS NOTE ADULT - ASSESSMENT
no plan for orthopedic surgical intervention per family and patient preference  local wound care per plastics and wound care team  continue IV antibiotic treatment per infectious disease   encourage her to spend majority of bed oob in bedside chair as able	  PT to help mobilize, she must remain 50% wb on the RLE and NO KNEE MOTION allowed, knee immobilizer if oob  coumadin, inr goal 2-3  continue to optimize nutrition  continue to involve psych    	Ntae Bass MD  Attending Orthopedic Surgeon

## 2018-04-27 NOTE — PROGRESS NOTE ADULT - SUBJECTIVE AND OBJECTIVE BOX
Plastic Surgery Progress Note    S: Patient seen and examined with attending.    Vital Signs Last 24 Hrs  T(C): 36.8 (27 Apr 2018 05:27), Max: 36.9 (26 Apr 2018 20:27)  T(F): 98.2 (27 Apr 2018 05:27), Max: 98.4 (26 Apr 2018 20:27)  HR: 84 (27 Apr 2018 05:27) (82 - 92)  BP: 155/66 (27 Apr 2018 05:27) (110/69 - 155/66)  BP(mean): --  RR: 20 (27 Apr 2018 05:27) (18 - 20)  SpO2: 100% (27 Apr 2018 05:27) (93% - 100%)  I&O's Detail    26 Apr 2018 07:01  -  27 Apr 2018 07:00  --------------------------------------------------------  IN:    IV PiggyBack: 50 mL    Oral Fluid: 720 mL  Total IN: 770 mL    OUT:  Total OUT: 0 mL    Total NET: 770 mL          Physical Exam:  General: Awake and alert, NAD  RLE: knee eschar stable,  proximal incision intact.  most proximal wound evolving

## 2018-04-27 NOTE — PROGRESS NOTE ADULT - SUBJECTIVE AND OBJECTIVE BOX
Patient is a 77y old  Female who presents with a chief complaint of fever (11 Apr 2018 14:25)  pt seen and examined at bedside   SUBJECTIVE/OVERNIGHT events noted, seen by wound care team -lower abd wound packing done    Vital Signs Last 24 Hrs  T(C): 36.5 (27 Apr 2018 14:09), Max: 36.9 (26 Apr 2018 20:27)  T(F): 97.7 (27 Apr 2018 14:09), Max: 98.4 (26 Apr 2018 20:27)  HR: 82 (27 Apr 2018 14:09) (82 - 92)  BP: 137/78 (27 Apr 2018 14:09) (110/69 - 155/66)  BP(mean): --  RR: 19 (27 Apr 2018 14:09) (18 - 20)  SpO2: 94% (27 Apr 2018 14:09) (93% - 100%)  CAPILLARY BLOOD GLUCOSE        MEDICATIONS  (STANDING):  ALPRAZolam 0.25 milliGRAM(s) Oral once  ascorbic acid 500 milliGRAM(s) Oral daily  aspirin enteric coated 81 milliGRAM(s) Oral daily  buDESOnide 160 MICROgram(s)/formoterol 4.5 MICROgram(s) Inhaler 2 Puff(s) Inhalation two times a day  calcium carbonate 1250 mG + Vitamin D (OsCal 500 + D) 1 Tablet(s) Oral three times a day  DAPTOmycin IVPB 800 milliGRAM(s) IV Intermittent every 24 hours  docusate sodium 100 milliGRAM(s) Oral three times a day  ferrous    sulfate 325 milliGRAM(s) Oral daily  folic acid 1 milliGRAM(s) Oral daily  melatonin 3 milliGRAM(s) Oral at bedtime  multivitamin 1 Tablet(s) Oral daily  nystatin    Suspension 324090 Unit(s) Oral every 6 hours  pantoprazole    Tablet 40 milliGRAM(s) Oral before breakfast  polyethylene glycol 3350 17 Gram(s) Oral daily  povidone iodine 10% Solution 1 Application(s) Topical three times a day  silver sulfADIAZINE 1% Cream 1 Application(s) Topical daily  sodium chloride 0.9%. 1000 milliLiter(s) (75 mL/Hr) IV Continuous <Continuous>  tiotropium 18 MICROgram(s) Capsule 1 Capsule(s) Inhalation daily  warfarin 0.5 milliGRAM(s) Oral once  zinc oxide 20% Ointment 1 Application(s) Topical daily    MEDICATIONS  (PRN):  acetaminophen   Tablet 650 milliGRAM(s) Oral every 6 hours PRN For Temp greater than 38 C (100.4 F)  acetaminophen   Tablet. 650 milliGRAM(s) Oral every 6 hours PRN Mild Pain (1 - 3)  HYDROmorphone  Injectable 0.5 milliGRAM(s) IV Push every 6 hours PRN Severe Pain (7 - 10)  lidocaine 2% Viscous 10 milliLiter(s) Swish and Spit every 6 hours PRN Mouth Sores  oxyCODONE    IR 2.5 milliGRAM(s) Oral every 4 hours PRN Moderate Pain (4 - 6)  senna 2 Tablet(s) Oral at bedtime PRN Constipation    PHYSICAL EXAM  General : comfortable, not in any acute distress  Neck : supple, no LAD, no JVD  Eyes : EOMI, PERRLA, conjunctiva : no icterus, no pallor  MM moist, no pharyngeal erythema/exudates  Pulmonary: clear to auscultation bilateral lung fields, no crackles/rhonchi/wheezing,   CVS : S1 S2,rate normal-,rhythm-regular, no murmurs, no abnormal sounds  Gastrointestinal: soft, non tender,lower abd wound packing  Extremities: rt LE wound dressing  Neuro: AAOx3, no focal deficits    LABS                        8.8    9.67  )-----------( 281      ( 26 Apr 2018 07:47 )             28.4     04-27    136  |  96  |  8   ----------------------------<  101<H>  3.8   |  28  |  0.62    Ca    8.7      27 Apr 2018 06:39        RADIOLOGY and IMAGING reviewed: yes  Consultant notes reviewed : yes  Care discussed with Consultants/other providers :

## 2018-04-27 NOTE — CONSULT NOTE ADULT - ASSESSMENT
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    Abdominal wound packing  Rt thigh wound - Collagenase  Rt Knee wound- as per plastics-  Culture of abdominal wound pending  Consider Psych or SW= pt is upset about her situation  con't Nutrition (as tolerated)  con't Offloading   con't Pericare  Care as per medicine will follow w/ you  Upon discharge f/u as outpatient at Wound Center [address] [place] [address] [phone]  Seen w/ attng and D/w team  Thank you for this consult  Janki Cramer PA-C CWS [id number]

## 2018-04-27 NOTE — CONSULT NOTE ADULT - SUBJECTIVE AND OBJECTIVE BOX
Wound SURGERY CONSULT NOTE    HPI:  78yo f PMH including HLD, GERD, Anxiety, Anemia, CAD s/p HERBERT, severe AS (s/p TAVR & PPM 12/17 [place] [employer] Model [id number]), h/o MVR, PMR on chronic steroids, asthma, OA, s/p TKR with recent joint infection s/p explant and abx spacer on 12/23/17, + rehab when had RLE DVT (dvt proph was held 2nd nose bleed)  on Coumadins/p  3/23/2018 Right knee explantation of previously placed articulating antibiotic spacer, irrigation and debridement of the knee, placement of static antibiotic spacer, with a Plastic Surgery soft tissue complex wound closure w/ local wound care.  Pt was d/c to rehab with plan  to complete 6 weeks of iv daptomycin (end 5/5) and then prolonged po keflex and minocycline, Orthopedics recommended Ice, elevate 50% PWD. Pt currently no knee ROM.     Pt returned w/ worsening weakness,   Overall poor appetite, Developed fever/chills yesterday &taken to ED for further work up.  Seen by ortho and plastics in ED with fever to 103.5.  Pt started on vanco/aztreonam.  Pt has been followed by Plastic & Ortho & ID.  Pt w/ complicated hospital course w/ sepsis/ elevated INR/ anemia/ r/o Cdiff.  Pt had developed wounds - necrotic hematomas on Abdominal wall and rt thigh.  At one point pt had INR of 8.  Once wounds had demarcated silvadene was used to tx wound.  Wound consult called as a second opinion/ assist w/ management- of these wounds.  Plastics and ortho hesitant to mechanically debride wounds 2/2 to pt's previous h/o poor healing.  No odor, redness, warmth, f/c/s noted.  Scant d/c noted by abdominal wound.        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  Artificial cardiac pacemaker: 12/25/17  s/p cardiac catheterization: 11/29/16 HERBERT placed on LAD  s/p endoscopy: with dilatation of esophageal stricture 2013  S/P cataract surgery: x2; 3-4yr ago  S/P gastroplasty: 28 yrs ago  S/P cholecystectomy: more than 15 years ago  S/P total knee replacement: left, 15yr ago  S/P total knee replacement: right, 12yr ago  S/P hysterectomy: 24yr ago      REVIEW OF SYSTEMS    Skin/Musculoskeletal:	see HPI  All other systems negative	    MEDICATIONS  (STANDING):  ALPRAZolam 0.25 milliGRAM(s) Oral once  ascorbic acid 500 milliGRAM(s) Oral daily  aspirin enteric coated 81 milliGRAM(s) Oral daily  buDESOnide 160 MICROgram(s)/formoterol 4.5 MICROgram(s) Inhaler 2 Puff(s) Inhalation two times a day  calcium carbonate 1250 mG + Vitamin D (OsCal 500 + D) 1 Tablet(s) Oral three times a day  DAPTOmycin IVPB 800 milliGRAM(s) IV Intermittent every 24 hours  docusate sodium 100 milliGRAM(s) Oral three times a day  ferrous    sulfate 325 milliGRAM(s) Oral daily  folic acid 1 milliGRAM(s) Oral daily  melatonin 3 milliGRAM(s) Oral at bedtime  multivitamin 1 Tablet(s) Oral daily  nystatin    Suspension 894095 Unit(s) Oral every 6 hours  pantoprazole    Tablet 40 milliGRAM(s) Oral before breakfast  polyethylene glycol 3350 17 Gram(s) Oral daily  povidone iodine 10% Solution 1 Application(s) Topical three times a day  silver sulfADIAZINE 1% Cream 1 Application(s) Topical daily  sodium chloride 0.9%. 1000 milliLiter(s) (75 mL/Hr) IV Continuous <Continuous>  tiotropium 18 MICROgram(s) Capsule 1 Capsule(s) Inhalation daily  warfarin 0.5 milliGRAM(s) Oral once  zinc oxide 20% Ointment 1 Application(s) Topical daily    MEDICATIONS  (PRN):  acetaminophen   Tablet 650 milliGRAM(s) Oral every 6 hours PRN For Temp greater than 38 C (100.4 F)  acetaminophen   Tablet. 650 milliGRAM(s) Oral every 6 hours PRN Mild Pain (1 - 3)  HYDROmorphone  Injectable 0.5 milliGRAM(s) IV Push every 6 hours PRN Severe Pain (7 - 10)  lidocaine 2% Viscous 10 milliLiter(s) Swish and Spit every 6 hours PRN Mouth Sores  oxyCODONE    IR 2.5 milliGRAM(s) Oral every 4 hours PRN Moderate Pain (4 - 6)  senna 2 Tablet(s) Oral at bedtime PRN Constipation      Allergies    amoxicillin (Other)    Intolerances    IV Contrast (Flushing (Skin); Nausea)      SOCIAL HISTORY:   (+)HHA; Denies smoking, ETOH, drugs    FAMILY HISTORY:  No pertinent family history in first degree relatives      Vital Signs Last 24 Hrs  T(C): 36.5 (27 Apr 2018 14:09), Max: 36.9 (26 Apr 2018 20:27)  T(F): 97.7 (27 Apr 2018 14:09), Max: 98.4 (26 Apr 2018 20:27)  HR: 82 (27 Apr 2018 14:09) (82 - 91)  BP: 137/78 (27 Apr 2018 14:09) (133/76 - 155/66)  BP(mean): --  RR: 19 (27 Apr 2018 14:09) (19 - 20)  SpO2: 94% (27 Apr 2018 14:09) (93% - 100%)    NAD /  A&Ox3  MO/ frail   Versa Care P500 bed    Cardiovascular: RRR    Respiratory: CTA    Gastrointestinal soft NT/ND (+)BS    Neurology  weakened strength & sensation grossly intact    Musculoskeletal/Vascular: decrease FROM   BLE edema  BLE equally warm no acute ischemia noted  no deformities/ contractures    Skin:  frail,  ecchymosis w/o hematoma  Abdominal wall w/ 3cm x 2cm x 1cm wound  soft eschar w/ thick pus like drainage (+)tender  Procedure Note  Using aseptic technique abdominal wound was incised and drained scissor and forceps through necrotic demis of purulent material.  Culture sent.  Pt tolerated procedure well.  Hemostasis was maintained throughout.    No odor, erythema, increased warmth, induration, fluctuance    Rt upper thigh w/ irregular shaped 13cm x 7.5cm x 3cm wound  soft eschar and slough noted  (+)tender  (+) serosanguinous drainage  No odor, erythema, increased warmth, tenderness, induration, fluctuance    Rt knee incision w/ eschar   medially form skin edge  (+)tender   (+) serosanguinous drainage  No odor, erythema, increased warmth, tenderness, induration, fluctuance    LABS:                        8.8    9.67  )-----------( 281      ( 26 Apr 2018 07:47 )             28.4     04-27    136  |  96  |  8   ----------------------------<  101<H>  3.8   |  28  |  0.62    Ca    8.7      27 Apr 2018 06:39      PT/INR - ( 27 Apr 2018 07:34 )   PT: 27.8 sec;   INR: 2.42 ratio         RADIOLOGY & ADDITIONAL STUDIES:  < from: CT Abdomen and Pelvis No Cont (04.19.18 @ 19:06) >  FINDINGS:    LOWER CHEST: Small bilateral pleural effusions, left greater than right,   with associated atelectasis. Improvement in bibasilar opacities. ICD   leads are partially imaged. Status post TAVR. Coronary stents are noted.    LIVER: The left hepatic lobe is markedly atrophic. There is lenticular   shaped fluid located between the right and left hepatic lobes.  BILE DUCTS: Normal caliber.  GALLBLADDER: Status post cholecystectomy.  SPLEEN: Multiple low-density lesion measuring up to 3.1 cm, without   significant interval change since 12/6/2016. An exophytic lesion arising   posteriorly from the spleen is also unchanged.  PANCREAS: Within normal limits.  ADRENALS: Within normal limits.  KIDNEYS/URETERS: There is a 5.2 cm renal cyst arising from the upper pole   of the left kidney. A 1.8 cm complex lesion arising from the lower pole   of left kidney is indeterminate.    BLADDER: Navarrete catheter in place.  REPRODUCTIVE ORGANS: Status post hysterectomy.    BOWEL: No bowel obstruction. The appendix is within normal limits.   Postsurgical changes in the stomach. Residual contrast from a recent   fluoroscopic procedure is noted in the ascending colon.  PERITONEUM: Trace perihepatic ascites  VESSELS:  Atherosclerotic disease. Infrarenal IVC filter in place.  RETROPERITONEUM: Scattered retroperitoneal and mesenteric lymph nodes   right external iliac lymph node measuring 1.2 cm.   ABDOMINAL WALL: Small ventral hernia containing nonobstructed small bowel   loops. Multiple hyperdensities noted within the subcutaneous tissues   measuring up to 3.2 cm, likely secondary to subcutaneous injection.  BONES: Multilevel degenerative changes are unchanged from prior CT.   Scoliosis about the lumbar spine.    IMPRESSION:   Slight interval improvement in bibasilar pneumonia.  Small amount of lenticular-shaped fluid between the right and the left   hepatic lobes.  Complex left renal lesion not well characterized on this exam but   unchanged since 12/6/2016. Continued follow-up is advised.    < from: CT Femur No Cont, Right (04.19.18 @ 19:06) >  FINDINGS:    BONE: Patient status post explantation of right total knee arthroplasty.   Antibiotic cement and intramedullary ruthann are identified crossing the knee   joint from the level of the distal femoral metaphysis to the level of the   mid tibial diaphysis. The hardware is intact.  No acute fracture or dislocation. No cortical destruction or periosteal   new bone formation. No focal lytic or blastic lesions. Cartilage space   narrowing and marginal osteophyte formation of the right hip.     SOFT TISSUE: Nonspecific stranding is identified in the subcutaneous fat   of the patient's pannus and medial aspect of the proximal thigh. No   focal, drainable fluid collection is identified on today's examination.   Thereis a small residual knee joint effusion, which may be secondary to   patient's reported recent washout. Atrophy of the musculature of the   right thigh. Vascular calcifications.      IMPRESSION:  1.  Patient status post explantation of right total knee arthroplasty and   washout. Antibiotic cement and intramedullary ruthann are identified in the   right knee joint. No evidence of acute hardware complication.  2.  No focal, drainable fluid collections are identified on today's   examination.  3.  Nonspecific subcutaneous edema involving the patient's pannus and   subcutaneous fat of the medial thigh.        Cultures:  Culture - Blood (04.13.18 @ 11:46)    Specimen Source: .Blood Blood-Peripheral    Culture Results:   No growth at 5 days. Wound SURGERY CONSULT NOTE    HPI:  78yo f PMH including HLD, GERD, Anxiety, Anemia, CAD s/p HERBERT, severe AS (s/p TAVR & PPM 12/17 [employer] Model [id number]), h/o MVR, PMR on chronic steroids, asthma, OA, s/p TKR with recent joint infection s/p explant and abx spacer on 12/23/17, + rehab when had RLE DVT (dvt proph was held 2nd nose bleed)  on Coumadins/p  3/23/2018 Right knee explantation of previously placed articulating antibiotic spacer, irrigation and debridement of the knee, placement of static antibiotic spacer, with a Plastic Surgery soft tissue complex wound closure w/ local wound care.  Pt was d/c to rehab with plan  to complete 6 weeks of iv daptomycin (end 5/5) and then prolonged po keflex and minocycline, Orthopedics recommended Ice, elevate 50% PWD. Pt currently no knee ROM.     Pt returned w/ worsening weakness,   Overall poor appetite, Developed fever/chills yesterday &taken to ED for further work up.  Seen by ortho and plastics in ED with fever to 103.5.  Pt started on vanco/aztreonam.  Pt has been followed by Plastic & Ortho & ID.  Pt w/ complicated hospital course w/ sepsis/ elevated INR/ anemia/ r/o Cdiff.  Pt had developed wounds - necrotic hematomas on Abdominal wall and rt thigh.  At one point pt had INR of 8.  Once wounds had demarcated silvadene was used to tx wound.  Wound consult called as a second opinion/ assist w/ management- of these wounds.  Plastics and ortho hesitant to mechanically debride wounds 2/2 to pt's previous h/o poor healing.  No odor, redness, warmth, f/c/s noted.  Scant d/c noted by abdominal wound.   All questions asked and answered to patients satisfaction.  Pt was premedicated as requested prior to evaluation      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  Artificial cardiac pacemaker: 12/25/17  s/p cardiac catheterization: 11/29/16 HERBERT placed on LAD  s/p endoscopy: with dilatation of esophageal stricture 2013  S/P cataract surgery: x2; 3-4yr ago  S/P gastroplasty: 28 yrs ago  S/P cholecystectomy: more than 15 years ago  S/P total knee replacement: left, 15yr ago  S/P total knee replacement: right, 12yr ago  S/P hysterectomy: 24yr ago      REVIEW OF SYSTEMS    Skin/Musculoskeletal:	see HPI  All other systems negative	    MEDICATIONS  (STANDING):  ALPRAZolam 0.25 milliGRAM(s) Oral once  ascorbic acid 500 milliGRAM(s) Oral daily  aspirin enteric coated 81 milliGRAM(s) Oral daily  buDESOnide 160 MICROgram(s)/formoterol 4.5 MICROgram(s) Inhaler 2 Puff(s) Inhalation two times a day  calcium carbonate 1250 mG + Vitamin D (OsCal 500 + D) 1 Tablet(s) Oral three times a day  DAPTOmycin IVPB 800 milliGRAM(s) IV Intermittent every 24 hours  docusate sodium 100 milliGRAM(s) Oral three times a day  ferrous    sulfate 325 milliGRAM(s) Oral daily  folic acid 1 milliGRAM(s) Oral daily  melatonin 3 milliGRAM(s) Oral at bedtime  multivitamin 1 Tablet(s) Oral daily  nystatin    Suspension 212023 Unit(s) Oral every 6 hours  pantoprazole    Tablet 40 milliGRAM(s) Oral before breakfast  polyethylene glycol 3350 17 Gram(s) Oral daily  povidone iodine 10% Solution 1 Application(s) Topical three times a day  silver sulfADIAZINE 1% Cream 1 Application(s) Topical daily  sodium chloride 0.9%. 1000 milliLiter(s) (75 mL/Hr) IV Continuous <Continuous>  tiotropium 18 MICROgram(s) Capsule 1 Capsule(s) Inhalation daily  warfarin 0.5 milliGRAM(s) Oral once  zinc oxide 20% Ointment 1 Application(s) Topical daily    MEDICATIONS  (PRN):  acetaminophen   Tablet 650 milliGRAM(s) Oral every 6 hours PRN For Temp greater than 38 C (100.4 F)  acetaminophen   Tablet. 650 milliGRAM(s) Oral every 6 hours PRN Mild Pain (1 - 3)  HYDROmorphone  Injectable 0.5 milliGRAM(s) IV Push every 6 hours PRN Severe Pain (7 - 10)  lidocaine 2% Viscous 10 milliLiter(s) Swish and Spit every 6 hours PRN Mouth Sores  oxyCODONE    IR 2.5 milliGRAM(s) Oral every 4 hours PRN Moderate Pain (4 - 6)  senna 2 Tablet(s) Oral at bedtime PRN Constipation      Allergies    amoxicillin (Other)    Intolerances    IV Contrast (Flushing (Skin); Nausea)      SOCIAL HISTORY:   (+)HHA; Denies smoking, ETOH, drugs    FAMILY HISTORY:  No pertinent family history in first degree relatives      Vital Signs Last 24 Hrs  T(C): 36.5 (27 Apr 2018 14:09), Max: 36.9 (26 Apr 2018 20:27)  T(F): 97.7 (27 Apr 2018 14:09), Max: 98.4 (26 Apr 2018 20:27)  HR: 82 (27 Apr 2018 14:09) (82 - 91)  BP: 137/78 (27 Apr 2018 14:09) (133/76 - 155/66)  BP(mean): --  RR: 19 (27 Apr 2018 14:09) (19 - 20)  SpO2: 94% (27 Apr 2018 14:09) (93% - 100%)    NAD /  A&Ox3  MO/ frail   Versa Care P500 bed    Cardiovascular: RRR    Respiratory: CTA    Gastrointestinal soft NT/ND (+)BS    Neurology  weakened strength & sensation grossly intact    Musculoskeletal/Vascular: decrease FROM   BLE edema  BLE equally warm no acute ischemia noted  no deformities/ contractures    Skin:  frail,  ecchymosis w/o hematoma  Abdominal wall w/ 3cm x 2cm x 1cm wound  soft eschar w/ thick pus like drainage (+)tender  Procedure Note  Using aseptic technique abdominal wound was incised and drained scissor and forceps through necrotic demis of purulent material.  Culture sent.  Pt tolerated procedure well.  Hemostasis was maintained throughout.    No odor, erythema, increased warmth, induration, fluctuance    Rt upper thigh w/ irregular shaped 13cm x 7.5cm x 3cm wound  soft eschar and slough noted  (+)tender  (+) serosanguinous drainage  No odor, erythema, increased warmth, tenderness, induration, fluctuance    Rt knee incision w/ eschar   medially form skin edge  (+)tender   (+) serosanguinous drainage  No odor, erythema, increased warmth, tenderness, induration, fluctuance    LABS:                        8.8    9.67  )-----------( 281      ( 26 Apr 2018 07:47 )             28.4     04-27    136  |  96  |  8   ----------------------------<  101<H>  3.8   |  28  |  0.62    Ca    8.7      27 Apr 2018 06:39      PT/INR - ( 27 Apr 2018 07:34 )   PT: 27.8 sec;   INR: 2.42 ratio         RADIOLOGY & ADDITIONAL STUDIES:  < from: CT Abdomen and Pelvis No Cont (04.19.18 @ 19:06) >  FINDINGS:    LOWER CHEST: Small bilateral pleural effusions, left greater than right,   with associated atelectasis. Improvement in bibasilar opacities. ICD   leads are partially imaged. Status post TAVR. Coronary stents are noted.    LIVER: The left hepatic lobe is markedly atrophic. There is lenticular   shaped fluid located between the right and left hepatic lobes.  BILE DUCTS: Normal caliber.  GALLBLADDER: Status post cholecystectomy.  SPLEEN: Multiple low-density lesion measuring up to 3.1 cm, without   significant interval change since 12/6/2016. An exophytic lesion arising   posteriorly from the spleen is also unchanged.  PANCREAS: Within normal limits.  ADRENALS: Within normal limits.  KIDNEYS/URETERS: There is a 5.2 cm renal cyst arising from the upper pole   of the left kidney. A 1.8 cm complex lesion arising from the lower pole   of left kidney is indeterminate.    BLADDER: Navarrete catheter in place.  REPRODUCTIVE ORGANS: Status post hysterectomy.    BOWEL: No bowel obstruction. The appendix is within normal limits.   Postsurgical changes in the stomach. Residual contrast from a recent   fluoroscopic procedure is noted in the ascending colon.  PERITONEUM: Trace perihepatic ascites  VESSELS:  Atherosclerotic disease. Infrarenal IVC filter in place.  RETROPERITONEUM: Scattered retroperitoneal and mesenteric lymph nodes   right external iliac lymph node measuring 1.2 cm.   ABDOMINAL WALL: Small ventral hernia containing nonobstructed small bowel   loops. Multiple hyperdensities noted within the subcutaneous tissues   measuring up to 3.2 cm, likely secondary to subcutaneous injection.  BONES: Multilevel degenerative changes are unchanged from prior CT.   Scoliosis about the lumbar spine.    IMPRESSION:   Slight interval improvement in bibasilar pneumonia.  Small amount of lenticular-shaped fluid between the right and the left   hepatic lobes.  Complex left renal lesion not well characterized on this exam but   unchanged since 12/6/2016. Continued follow-up is advised.    < from: CT Femur No Cont, Right (04.19.18 @ 19:06) >  FINDINGS:    BONE: Patient status post explantation of right total knee arthroplasty.   Antibiotic cement and intramedullary ruthann are identified crossing the knee   joint from the level of the distal femoral metaphysis to the level of the   mid tibial diaphysis. The hardware is intact.  No acute fracture or dislocation. No cortical destruction or periosteal   new bone formation. No focal lytic or blastic lesions. Cartilage space   narrowing and marginal osteophyte formation of the right hip.     SOFT TISSUE: Nonspecific stranding is identified in the subcutaneous fat   of the patient's pannus and medial aspect of the proximal thigh. No   focal, drainable fluid collection is identified on today's examination.   Thereis a small residual knee joint effusion, which may be secondary to   patient's reported recent washout. Atrophy of the musculature of the   right thigh. Vascular calcifications.      IMPRESSION:  1.  Patient status post explantation of right total knee arthroplasty and   washout. Antibiotic cement and intramedullary ruthann are identified in the   right knee joint. No evidence of acute hardware complication.  2.  No focal, drainable fluid collections are identified on today's   examination.  3.  Nonspecific subcutaneous edema involving the patient's pannus and   subcutaneous fat of the medial thigh.        Cultures:  Culture - Blood (04.13.18 @ 11:46)    Specimen Source: .Blood Blood-Peripheral    Culture Results:   No growth at 5 days.

## 2018-04-27 NOTE — CONSULT NOTE ADULT - ATTENDING COMMENTS
78 yo female with recent h/o infected Right TKR, s/p explant, wound reexploration,  plastic closure of wound  S/p bilateral  TKR 12 and 15 yrs ago  S/P abd hysterectomy 29 yrs ago for benign disease  Being followed by Plastics- wound consult requested  Initially seen by Wound care RN on 4/9/18  Currently , at bedrest, using nasal O2, tearful   Is overweight  there are 2 large wounds , with associated skin loss, of right leg and thigh , corresponding to surgical site and subsequent hematoma of right thigh, 2/2 use of AC in light of DVT  In addition, there is a healed lower midline incision(Hyst)  to the right of the midline in the lower abdomen, just superior to pelvic fold, in panniculus, is a 1 cm area of skin loss, with associated small purulent, non odorous, draining fluid- small cavity packed with Aquacel, C&S done  Patient had been premedicated  Status fully discussed  All wounds dressed  OP f/u info given 78 yo female with recent h/o infected Right TKR reported as following a dental procedure,  s/p explant, wound reexploration,  plastic closure of wound  S/p bilateral  TKR 12 and 15 yrs ago  S/P abd hysterectomy 29 yrs ago for benign disease  Being followed by Plastics- wound consult requested  Initially seen by Wound care RN on 4/9/18  Currently , at bedrest, using nasal O2, tearful   Is overweight  there are 2 large wounds , with associated skin loss, of right leg and thigh , corresponding to surgical site and subsequent hematoma of right thigh, 2/2 use of AC in light of DVT  In addition, there is a healed lower midline incision(Hyst)  to the right of the midline in the lower abdomen, just superior to pelvic fold, in panniculus, is a 1 cm area of skin loss, with associated small purulent, non odorous, draining fluid- small cavity packed with Aquacel, C&S done. This may represent fat necrosis.  Patient had been premedicated  Status fully discussed  Aide at bedside  All wounds dressed  OP f/u info given

## 2018-04-27 NOTE — PROGRESS NOTE ADULT - SUBJECTIVE AND OBJECTIVE BOX
CHIEF COMPLAINT:    Interval Events:    REVIEW OF SYSTEMS:  Constitutional: No fevers or chills. No weight loss. No fatigue or generalized malaise.  Eyes: No itching or discharge from the eyes  ENT: No ear pain. No ear discharge. No nasal congestion. No post nasal drip. No epistaxis. No throat pain. No sore throat. No difficulty swallowing.   CV: No chest pain. No palpitations. No lightheadedness or dizziness.   Resp: No dyspnea at rest. No dyspnea on exertion. No orthopnea. No wheezing. No cough. No stridor. No sputum production. No chest pain with respiration.  GI: No nausea. No vomiting. No diarrhea.  MSK: No joint pain or pain in any extremities  Integumentary: No skin lesions. No pedal edema.  Neurological: No gross motor weakness. No sensory changes.  [ ] All other systems negative  [ ] Unable to assess ROS because ________    OBJECTIVE:  ICU Vital Signs Last 24 Hrs  T(C): 36.7 (27 Apr 2018 01:12), Max: 36.9 (26 Apr 2018 20:27)  T(F): 98 (27 Apr 2018 01:12), Max: 98.4 (26 Apr 2018 20:27)  HR: 82 (27 Apr 2018 01:12) (82 - 92)  BP: 133/76 (27 Apr 2018 01:12) (110/69 - 147/78)  BP(mean): --  ABP: --  ABP(mean): --  RR: 20 (27 Apr 2018 01:12) (18 - 20)  SpO2: 95% (27 Apr 2018 01:12) (93% - 97%)        04-25 @ 07:01 - 04-26 @ 07:00  --------------------------------------------------------  IN: 530 mL / OUT: 0 mL / NET: 530 mL    04-26 @ 07:01 - 04-27 @ 04:52  --------------------------------------------------------  IN: 770 mL / OUT: 0 mL / NET: 770 mL      CAPILLARY BLOOD GLUCOSE          PHYSICAL EXAM:  General: Awake, alert, oriented X 3.   HEENT: Atraumatic, normocephalic.                 Mallampatti Grade                 No nasal congestion.                No tonsillar or pharyngeal exudates.  Lymph Nodes: No palpable lymphadenopathy  Neck: No JVD. No carotid bruit.   Respiratory: Normal chest expansion                         Normal percussion                         Normal and equal air entry                         No wheeze, rhonchi or rales.  Cardiovascular: S1 S2 normal. No murmurs, rubs or gallops.   Abdomen: Soft, non-tender, non-distended. No organomegaly.  Extremities: Warm to touch. Peripheral pulse palpable. No pedal edema.   Skin: No rashes or skin lesions  Neurological: Motor and sensory examination equal and normal in all four extremities.  Psychiatry: Appropriate mood and affect.    HOSPITAL MEDICATIONS:  MEDICATIONS  (STANDING):  ALPRAZolam 0.25 milliGRAM(s) Oral once  ascorbic acid 500 milliGRAM(s) Oral daily  aspirin enteric coated 81 milliGRAM(s) Oral daily  buDESOnide 160 MICROgram(s)/formoterol 4.5 MICROgram(s) Inhaler 2 Puff(s) Inhalation two times a day  calcium carbonate 1250 mG + Vitamin D (OsCal 500 + D) 1 Tablet(s) Oral three times a day  DAPTOmycin IVPB 800 milliGRAM(s) IV Intermittent every 24 hours  docusate sodium 100 milliGRAM(s) Oral three times a day  ferrous    sulfate 325 milliGRAM(s) Oral daily  folic acid 1 milliGRAM(s) Oral daily  melatonin 3 milliGRAM(s) Oral at bedtime  multivitamin 1 Tablet(s) Oral daily  nystatin    Suspension 272008 Unit(s) Oral every 6 hours  pantoprazole    Tablet 40 milliGRAM(s) Oral before breakfast  polyethylene glycol 3350 17 Gram(s) Oral daily  povidone iodine 10% Solution 1 Application(s) Topical three times a day  silver sulfADIAZINE 1% Cream 1 Application(s) Topical daily  sodium chloride 0.9%. 1000 milliLiter(s) (75 mL/Hr) IV Continuous <Continuous>  tiotropium 18 MICROgram(s) Capsule 1 Capsule(s) Inhalation daily  zinc oxide 20% Ointment 1 Application(s) Topical daily    MEDICATIONS  (PRN):  acetaminophen   Tablet 650 milliGRAM(s) Oral every 6 hours PRN For Temp greater than 38 C (100.4 F)  acetaminophen   Tablet. 650 milliGRAM(s) Oral every 6 hours PRN Mild Pain (1 - 3)  HYDROmorphone  Injectable 0.5 milliGRAM(s) IV Push every 6 hours PRN Severe Pain (7 - 10)  lidocaine 2% Viscous 10 milliLiter(s) Swish and Spit every 6 hours PRN Mouth Sores  oxyCODONE    IR 2.5 milliGRAM(s) Oral every 4 hours PRN Moderate Pain (4 - 6)  senna 2 Tablet(s) Oral at bedtime PRN Constipation      LABS:                        8.8    9.67  )-----------( 281      ( 26 Apr 2018 07:47 )             28.4     04-26    136  |  97  |  7   ----------------------------<  95  3.4<L>   |  29  |  0.63    Ca    8.7      26 Apr 2018 06:41      PT/INR - ( 26 Apr 2018 07:47 )   PT: 29.1 sec;   INR: 2.53 ratio                   MICROBIOLOGY:     RADIOLOGY:  [ ] Reviewed and interpreted by me    Point of Care Ultrasound Findings:    PFT:    EKG: CHIEF COMPLAINT: no significant complaints-fear of situation-no sob or cough or HB    Interval Events: ortho, plastics, ID follow up    REVIEW OF SYSTEMS:  Constitutional: No fevers or chills. No weight loss. + fatigue or generalized malaise.  Eyes: No itching or discharge from the eyes  ENT: No ear pain. No ear discharge. No nasal congestion. No post nasal drip. No epistaxis. No throat pain. No sore throat. No difficulty swallowing.   CV: No chest pain. No palpitations. No lightheadedness or dizziness.   Resp: No dyspnea at rest. No dyspnea on exertion. No orthopnea. No wheezing. No cough. No stridor. No sputum production. No chest pain with respiration.  GI: No nausea. No vomiting. No diarrhea.  MSK: No joint pain or pain in any extremities--except RLE  Integumentary: No skin lesions. No pedal edema.  Neurological: No gross motor weakness--RLL No sensory changes.  [+ ] All other systems negative  [ ] Unable to assess ROS because ________    OBJECTIVE:  ICU Vital Signs Last 24 Hrs  T(C): 36.7 (27 Apr 2018 01:12), Max: 36.9 (26 Apr 2018 20:27)  T(F): 98 (27 Apr 2018 01:12), Max: 98.4 (26 Apr 2018 20:27)  HR: 82 (27 Apr 2018 01:12) (82 - 92)  BP: 133/76 (27 Apr 2018 01:12) (110/69 - 147/78)  BP(mean): --  ABP: --  ABP(mean): --  RR: 20 (27 Apr 2018 01:12) (18 - 20)  SpO2: 95% (27 Apr 2018 01:12) (93% - 97%)        04-25 @ 07:01  -  04-26 @ 07:00  --------------------------------------------------------  IN: 530 mL / OUT: 0 mL / NET: 530 mL    04-26 @ 07:01  -  04-27 @ 04:52  --------------------------------------------------------  IN: 770 mL / OUT: 0 mL / NET: 770 mL      CAPILLARY BLOOD GLUCOSE          PHYSICAL EXAM: NAD on O2 NC  General: Awake, alert, oriented X 3.   HEENT: Atraumatic, normocephalic.                 Mallampatti Grade 3                No nasal congestion.                No tonsillar or pharyngeal exudates.  Lymph Nodes: No palpable lymphadenopathy  Neck: No JVD. No carotid bruit.   Respiratory: abnormal chest expansion-reduced BS bases                         Normal percussion                         Normal and equal air entry                         No wheeze, rhonchi or rales.  Cardiovascular: S1 S2 normal. No murmurs, rubs or gallops.   Abdomen: Soft, non-tender, non-distended. No organomegaly.  Extremities: Warm to touch. Peripheral pulse palpable. No pedal edema. RLE wound  Skin: No rashes or skin lesions  Neurological: Motor and sensory examination equal and normal in all four extremities.  Psychiatry: Appropriate mood and affect.    HOSPITAL MEDICATIONS:  MEDICATIONS  (STANDING):  ALPRAZolam 0.25 milliGRAM(s) Oral once  ascorbic acid 500 milliGRAM(s) Oral daily  aspirin enteric coated 81 milliGRAM(s) Oral daily  buDESOnide 160 MICROgram(s)/formoterol 4.5 MICROgram(s) Inhaler 2 Puff(s) Inhalation two times a day  calcium carbonate 1250 mG + Vitamin D (OsCal 500 + D) 1 Tablet(s) Oral three times a day  DAPTOmycin IVPB 800 milliGRAM(s) IV Intermittent every 24 hours  docusate sodium 100 milliGRAM(s) Oral three times a day  ferrous    sulfate 325 milliGRAM(s) Oral daily  folic acid 1 milliGRAM(s) Oral daily  melatonin 3 milliGRAM(s) Oral at bedtime  multivitamin 1 Tablet(s) Oral daily  nystatin    Suspension 574437 Unit(s) Oral every 6 hours  pantoprazole    Tablet 40 milliGRAM(s) Oral before breakfast  polyethylene glycol 3350 17 Gram(s) Oral daily  povidone iodine 10% Solution 1 Application(s) Topical three times a day  silver sulfADIAZINE 1% Cream 1 Application(s) Topical daily  sodium chloride 0.9%. 1000 milliLiter(s) (75 mL/Hr) IV Continuous <Continuous>  tiotropium 18 MICROgram(s) Capsule 1 Capsule(s) Inhalation daily  zinc oxide 20% Ointment 1 Application(s) Topical daily    MEDICATIONS  (PRN):  acetaminophen   Tablet 650 milliGRAM(s) Oral every 6 hours PRN For Temp greater than 38 C (100.4 F)  acetaminophen   Tablet. 650 milliGRAM(s) Oral every 6 hours PRN Mild Pain (1 - 3)  HYDROmorphone  Injectable 0.5 milliGRAM(s) IV Push every 6 hours PRN Severe Pain (7 - 10)  lidocaine 2% Viscous 10 milliLiter(s) Swish and Spit every 6 hours PRN Mouth Sores  oxyCODONE    IR 2.5 milliGRAM(s) Oral every 4 hours PRN Moderate Pain (4 - 6)  senna 2 Tablet(s) Oral at bedtime PRN Constipation      LABS:                        8.8    9.67  )-----------( 281      ( 26 Apr 2018 07:47 )             28.4     04-26    136  |  97  |  7   ----------------------------<  95  3.4<L>   |  29  |  0.63    Ca    8.7      26 Apr 2018 06:41      PT/INR - ( 26 Apr 2018 07:47 )   PT: 29.1 sec;   INR: 2.53 ratio                   MICROBIOLOGY:     RADIOLOGY:  [ ] Reviewed and interpreted by me    Point of Care Ultrasound Findings:    PFT:    EKG:

## 2018-04-27 NOTE — PROGRESS NOTE ADULT - ASSESSMENT
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    # Fever, resolved  # Recent septic Rt TKR - abx spacer exchanged and plastics complex wound closure  # Aspiration PNA, resolved  # Supratherapeutic INR/coagulopathy, resolved  # PMR on chronic steroids  # thigh hematoma, leg wound  #lower abd wound    Plan:  remains afebrile, cont Dapto per ID until 5/5, BC NTD, appreciate ID recs  h/h stable  appreciate plastics surgery recs, local wound care, to stabilize wound before considering rehab  wound care c/s appreciated  will cont coumadin and asa  pain control, titrate to PO  PT OOB    plan of care dw patient, NP

## 2018-04-27 NOTE — PROGRESS NOTE ADULT - ATTENDING COMMENTS
as above-  Pulm relatively stable-atelectasis, prior PE, asthma, cardiac Dz  Inhaler regimen as outlined above  DVT rx /prophylaxis  ID follow up of ABX  (IV for 42 days total then PO minocycline) PT evaluation and Psych evaluation    ortho/plastics follow up--?AKA-conservative rx    Everett Kumar MD-Pulmonary   [phone] as above-  Pulm relatively stable-atelectasis, prior PE, asthma, cardiac Dz  Inhaler regimen as outlined above  DVT rx /prophylaxis  ID follow up of ABX  (IV for 42 days total then PO minocycline) PT evaluation and Psych evaluation    ortho/plastics follow up--?AKA-conservative rx---? debridement today as per pt.    Everett Kumar MD-Pulmonary   [phone]

## 2018-04-27 NOTE — PROGRESS NOTE ADULT - SUBJECTIVE AND OBJECTIVE BOX
CC: f/u for septic right tkr, pneumonia    Patient reports she had a lot of wound care done today and doesnt want dressing removed anywhere    REVIEW OF SYSTEMS:  All other review of systems negative (Comprehensive ROS)    Antimicrobials Day #  :34/42  DAPTOmycin IVPB 800 milliGRAM(s) IV Intermittent every 24 hours  nystatin    Suspension 172776 Unit(s) Oral every 6 hours    Other Medications Reviewed    T(F): 97.7 (04-27-18 @ 14:09), Max: 98.4 (04-26-18 @ 20:27)  HR: 82 (04-27-18 @ 14:09)  BP: 137/78 (04-27-18 @ 14:09)  RR: 19 (04-27-18 @ 14:09)  SpO2: 94% (04-27-18 @ 14:09)  Wt(kg): --    PHYSICAL EXAM:  General: alert, no acute distress  Eyes:  anicteric, no conjunctival injection, no discharge  Oropharynx: no lesions or injection 	  Neck: supple, without adenopathy  Lungs: decreased at bases  to auscultation  Heart: regular rate and rhythm; no murmur, rubs or gallops  Abdomen: soft, nondistended, nontender, without mass or organomegaly. dressed wound on pannus  Skin: no lesions  Extremities: legs with edema, thigh and knee dressed, no surrounding cellulitis, dr duckworth exam noted  Neurologic: alert, oriented, moves all extremities    LAB RESULTS:                        8.8    9.67  )-----------( 281      ( 26 Apr 2018 07:47 )             28.4     04-27    136  |  96  |  8   ----------------------------<  101<H>  3.8   |  28  |  0.62    Ca    8.7      27 Apr 2018 06:39  cpk 27    < from: CT Abdomen and Pelvis No Cont (04.19.18 @ 19:06) >  IMPRESSION:   Slight interval improvement in bibasilar pneumonia.  Small amount of lenticular-shaped fluid between the right and the left   hepatic lobes.  Complex left renal lesion not well characterized on this exam but     < end of copied text >  < from: CT Femur No Cont, Right (04.19.18 @ 19:06) >  MPRESSION:  1.  Patient status post explantation of right total knee arthroplasty and   washout. Antibiotic cement and intramedullary ruthann are identified in the   right knee joint. No evidence of acute hardware complication.  2.  No focal, drainable fluid collections are identified on today's   examination.  3.  Nonspecific subcutaneous edema involving the patient's pannus and   subcutaneous fat of the medial thigh.      < end of copied text >        Assessment:  Patient with right knee tkr septic arthritis with strept s/p stage 1 of 2 stage revision. She then developed non healing wound so spacer exchanged and grew mrsa from area. Went to rehab and returns with pneumonia now treated. She has an area of hematoma with fat necrosis of the right thigh and ongoing eschar of the right knee which is managed with local care. Patient and family refuse amputation. Now had debridement of an abdomen wound too. Doubt we can sterilize the knee but will suppress. No drug toxicity  Plan: 9 more day of dapto then suppressive minocycline  local wound care

## 2018-04-28 LAB
ANION GAP SERPL CALC-SCNC: 10 MMOL/L — SIGNIFICANT CHANGE UP (ref 5–17)
BUN SERPL-MCNC: 8 MG/DL — SIGNIFICANT CHANGE UP (ref 7–23)
CALCIUM SERPL-MCNC: 8.7 MG/DL — SIGNIFICANT CHANGE UP (ref 8.4–10.5)
CHLORIDE SERPL-SCNC: 96 MMOL/L — SIGNIFICANT CHANGE UP (ref 96–108)
CO2 SERPL-SCNC: 29 MMOL/L — SIGNIFICANT CHANGE UP (ref 22–31)
CREAT SERPL-MCNC: 0.63 MG/DL — SIGNIFICANT CHANGE UP (ref 0.5–1.3)
GLUCOSE SERPL-MCNC: 94 MG/DL — SIGNIFICANT CHANGE UP (ref 70–99)
HCT VFR BLD CALC: 29.4 % — LOW (ref 34.5–45)
HGB BLD-MCNC: 9 G/DL — LOW (ref 11.5–15.5)
INR BLD: 2.19 RATIO — HIGH (ref 0.88–1.16)
MCHC RBC-ENTMCNC: 27.4 PG — SIGNIFICANT CHANGE UP (ref 27–34)
MCHC RBC-ENTMCNC: 30.6 GM/DL — LOW (ref 32–36)
MCV RBC AUTO: 89.4 FL — SIGNIFICANT CHANGE UP (ref 80–100)
PLATELET # BLD AUTO: 304 K/UL — SIGNIFICANT CHANGE UP (ref 150–400)
POTASSIUM SERPL-MCNC: 3.9 MMOL/L — SIGNIFICANT CHANGE UP (ref 3.5–5.3)
POTASSIUM SERPL-SCNC: 3.9 MMOL/L — SIGNIFICANT CHANGE UP (ref 3.5–5.3)
PROTHROM AB SERPL-ACNC: 25.1 SEC — HIGH (ref 10–13.1)
RBC # BLD: 3.29 M/UL — LOW (ref 3.8–5.2)
RBC # FLD: 17 % — HIGH (ref 10.3–14.5)
SODIUM SERPL-SCNC: 135 MMOL/L — SIGNIFICANT CHANGE UP (ref 135–145)
WBC # BLD: 11.01 K/UL — HIGH (ref 3.8–10.5)
WBC # FLD AUTO: 11.01 K/UL — HIGH (ref 3.8–10.5)

## 2018-04-28 RX ORDER — OXYCODONE HYDROCHLORIDE 5 MG/1
2.5 TABLET ORAL ONCE
Qty: 0 | Refills: 0 | Status: DISCONTINUED | OUTPATIENT
Start: 2018-04-28 | End: 2018-05-03

## 2018-04-28 RX ADMIN — Medication 500000 UNIT(S): at 15:03

## 2018-04-28 RX ADMIN — HYDROMORPHONE HYDROCHLORIDE 0.5 MILLIGRAM(S): 2 INJECTION INTRAMUSCULAR; INTRAVENOUS; SUBCUTANEOUS at 09:39

## 2018-04-28 RX ADMIN — Medication 3 MILLIGRAM(S): at 21:18

## 2018-04-28 RX ADMIN — HYDROMORPHONE HYDROCHLORIDE 0.5 MILLIGRAM(S): 2 INJECTION INTRAMUSCULAR; INTRAVENOUS; SUBCUTANEOUS at 03:00

## 2018-04-28 RX ADMIN — Medication 1 APPLICATION(S): at 13:30

## 2018-04-28 RX ADMIN — Medication 1 APPLICATION(S): at 05:54

## 2018-04-28 RX ADMIN — HYDROMORPHONE HYDROCHLORIDE 0.5 MILLIGRAM(S): 2 INJECTION INTRAMUSCULAR; INTRAVENOUS; SUBCUTANEOUS at 02:30

## 2018-04-28 RX ADMIN — Medication 100 MILLIGRAM(S): at 21:18

## 2018-04-28 RX ADMIN — Medication 1 APPLICATION(S): at 21:18

## 2018-04-28 RX ADMIN — Medication 1 APPLICATION(S): at 17:12

## 2018-04-28 RX ADMIN — BUDESONIDE AND FORMOTEROL FUMARATE DIHYDRATE 2 PUFF(S): 160; 4.5 AEROSOL RESPIRATORY (INHALATION) at 05:51

## 2018-04-28 RX ADMIN — OXYCODONE HYDROCHLORIDE 2.5 MILLIGRAM(S): 5 TABLET ORAL at 21:48

## 2018-04-28 RX ADMIN — Medication 500000 UNIT(S): at 09:44

## 2018-04-28 RX ADMIN — HYDROMORPHONE HYDROCHLORIDE 0.5 MILLIGRAM(S): 2 INJECTION INTRAMUSCULAR; INTRAVENOUS; SUBCUTANEOUS at 19:50

## 2018-04-28 RX ADMIN — Medication 500000 UNIT(S): at 21:19

## 2018-04-28 RX ADMIN — Medication 1 TABLET(S): at 13:32

## 2018-04-28 RX ADMIN — TIOTROPIUM BROMIDE 1 CAPSULE(S): 18 CAPSULE ORAL; RESPIRATORY (INHALATION) at 11:18

## 2018-04-28 RX ADMIN — HYDROMORPHONE HYDROCHLORIDE 0.5 MILLIGRAM(S): 2 INJECTION INTRAMUSCULAR; INTRAVENOUS; SUBCUTANEOUS at 13:35

## 2018-04-28 RX ADMIN — OXYCODONE HYDROCHLORIDE 2.5 MILLIGRAM(S): 5 TABLET ORAL at 21:18

## 2018-04-28 RX ADMIN — Medication 500000 UNIT(S): at 05:51

## 2018-04-28 RX ADMIN — Medication 325 MILLIGRAM(S): at 11:19

## 2018-04-28 RX ADMIN — HYDROMORPHONE HYDROCHLORIDE 0.5 MILLIGRAM(S): 2 INJECTION INTRAMUSCULAR; INTRAVENOUS; SUBCUTANEOUS at 14:05

## 2018-04-28 RX ADMIN — HYDROMORPHONE HYDROCHLORIDE 0.5 MILLIGRAM(S): 2 INJECTION INTRAMUSCULAR; INTRAVENOUS; SUBCUTANEOUS at 08:39

## 2018-04-28 RX ADMIN — Medication 1 TABLET(S): at 11:19

## 2018-04-28 RX ADMIN — Medication 1 TABLET(S): at 21:18

## 2018-04-28 RX ADMIN — Medication 100 MILLIGRAM(S): at 13:31

## 2018-04-28 RX ADMIN — OXYCODONE HYDROCHLORIDE 2.5 MILLIGRAM(S): 5 TABLET ORAL at 06:59

## 2018-04-28 RX ADMIN — OXYCODONE HYDROCHLORIDE 2.5 MILLIGRAM(S): 5 TABLET ORAL at 16:15

## 2018-04-28 RX ADMIN — Medication 1 TABLET(S): at 05:52

## 2018-04-28 RX ADMIN — BUDESONIDE AND FORMOTEROL FUMARATE DIHYDRATE 2 PUFF(S): 160; 4.5 AEROSOL RESPIRATORY (INHALATION) at 17:12

## 2018-04-28 RX ADMIN — HYDROMORPHONE HYDROCHLORIDE 0.5 MILLIGRAM(S): 2 INJECTION INTRAMUSCULAR; INTRAVENOUS; SUBCUTANEOUS at 20:20

## 2018-04-28 RX ADMIN — Medication 500 MILLIGRAM(S): at 11:18

## 2018-04-28 RX ADMIN — OXYCODONE HYDROCHLORIDE 2.5 MILLIGRAM(S): 5 TABLET ORAL at 17:13

## 2018-04-28 RX ADMIN — Medication 1 APPLICATION(S): at 11:19

## 2018-04-28 RX ADMIN — OXYCODONE HYDROCHLORIDE 2.5 MILLIGRAM(S): 5 TABLET ORAL at 06:03

## 2018-04-28 RX ADMIN — Medication 1 MILLIGRAM(S): at 11:19

## 2018-04-28 RX ADMIN — DAPTOMYCIN 132 MILLIGRAM(S): 500 INJECTION, POWDER, LYOPHILIZED, FOR SOLUTION INTRAVENOUS at 09:44

## 2018-04-28 RX ADMIN — ZINC OXIDE 1 APPLICATION(S): 200 OINTMENT TOPICAL at 11:20

## 2018-04-28 RX ADMIN — POLYETHYLENE GLYCOL 3350 17 GRAM(S): 17 POWDER, FOR SOLUTION ORAL at 11:19

## 2018-04-28 RX ADMIN — Medication 100 MILLIGRAM(S): at 05:54

## 2018-04-28 RX ADMIN — Medication 81 MILLIGRAM(S): at 11:18

## 2018-04-28 RX ADMIN — PANTOPRAZOLE SODIUM 40 MILLIGRAM(S): 20 TABLET, DELAYED RELEASE ORAL at 05:53

## 2018-04-28 NOTE — PROGRESS NOTE ADULT - SUBJECTIVE AND OBJECTIVE BOX
Patient is a 77y old  Female who presents with a chief complaint of fever (11 Apr 2018 14:25)  pt seen and examined at bedside   SUBJECTIVE/OVERNIGHT events noted   s/p plastics- LE wound dressing this am  pt c/o pain at that site    Vital Signs Last 24 Hrs  T(C): 36.9 (28 Apr 2018 05:33), Max: 36.9 (28 Apr 2018 05:33)  T(F): 98.4 (28 Apr 2018 05:33), Max: 98.4 (28 Apr 2018 05:33)  HR: 75 (28 Apr 2018 05:33) (75 - 86)  BP: 136/80 (28 Apr 2018 05:33) (121/71 - 145/62)  BP(mean): --  RR: 20 (28 Apr 2018 05:33) (19 - 20)  SpO2: 93% (28 Apr 2018 05:33) (93% - 94%)  CAPILLARY BLOOD GLUCOSE        MEDICATIONS  (STANDING):  ALPRAZolam 0.25 milliGRAM(s) Oral once  ascorbic acid 500 milliGRAM(s) Oral daily  aspirin enteric coated 81 milliGRAM(s) Oral daily  buDESOnide 160 MICROgram(s)/formoterol 4.5 MICROgram(s) Inhaler 2 Puff(s) Inhalation two times a day  calcium carbonate 1250 mG + Vitamin D (OsCal 500 + D) 1 Tablet(s) Oral three times a day  collagenase Ointment 1 Application(s) Topical two times a day  Dakins Solution - 1/4 Strength 1 Application(s) Topical two times a day  DAPTOmycin IVPB 800 milliGRAM(s) IV Intermittent every 24 hours  docusate sodium 100 milliGRAM(s) Oral three times a day  ferrous    sulfate 325 milliGRAM(s) Oral daily  folic acid 1 milliGRAM(s) Oral daily  melatonin 3 milliGRAM(s) Oral at bedtime  multivitamin 1 Tablet(s) Oral daily  nystatin    Suspension 320230 Unit(s) Oral every 6 hours  pantoprazole    Tablet 40 milliGRAM(s) Oral before breakfast  polyethylene glycol 3350 17 Gram(s) Oral daily  povidone iodine 10% Solution 1 Application(s) Topical three times a day  silver sulfADIAZINE 1% Cream 1 Application(s) Topical daily  sodium chloride 0.9%. 1000 milliLiter(s) (75 mL/Hr) IV Continuous <Continuous>  tiotropium 18 MICROgram(s) Capsule 1 Capsule(s) Inhalation daily  zinc oxide 20% Ointment 1 Application(s) Topical daily    MEDICATIONS  (PRN):  acetaminophen   Tablet 650 milliGRAM(s) Oral every 6 hours PRN For Temp greater than 38 C (100.4 F)  acetaminophen   Tablet. 650 milliGRAM(s) Oral every 6 hours PRN Mild Pain (1 - 3)  HYDROmorphone  Injectable 0.5 milliGRAM(s) IV Push every 6 hours PRN Severe Pain (7 - 10)  lidocaine 2% Viscous 10 milliLiter(s) Swish and Spit every 6 hours PRN Mouth Sores  oxyCODONE    IR 2.5 milliGRAM(s) Oral every 4 hours PRN Moderate Pain (4 - 6)  senna 2 Tablet(s) Oral at bedtime PRN Constipation    PHYSICAL EXAM  General : comfortable, not in any acute distress  Neck : supple, no LAD, no JVD  Eyes : EOMI, PERRLA, conjunctiva : no icterus, no pallor  MM moist, no pharyngeal erythema/exudates  Pulmonary: clear to auscultation bilateral lung fields, no crackles/rhonchi/wheezing,   CVS : S1 S2,rate normal-,rhythm-regular, no murmurs, no abnormal sounds  Gastrointestinal: soft, non tender, non distended, no organomegaly , bowel sounds audible  Extremities: Rt LE thigh wounds dressing, thigh swelling  Neuro: AAOx3, no focal deficits  Musculoskeletal:  FROM of all ext  Skin: no rash  LABS                        9.0    11.01 )-----------( 304      ( 28 Apr 2018 08:23 )             29.4     04-28    135  |  96  |  8   ----------------------------<  94  3.9   |  29  |  0.63    Ca    8.7      28 Apr 2018 06:59        RADIOLOGY and IMAGING reviewed: yes  Consultant notes reviewed : yes  Care discussed with Consultants/other providers :

## 2018-04-28 NOTE — PROGRESS NOTE ADULT - ASSESSMENT
-cont local wound care : collagenase to R abd wound BID and wet to dry w/ dakins BID to RLE wound/eschar for 2 days; then will switch to wet to dry for both  - will cont to follow while in house

## 2018-04-28 NOTE — PROGRESS NOTE ADULT - SUBJECTIVE AND OBJECTIVE BOX
pain controlled, awake and alert  afebrile    RLE  thigh with fat necrosis and  from surround skin, further demarkating, no surrounding erythema  surgical wound intact with stable eschar, some separation of eschar medially from skin, no further exudate  5/5 ta/ehl/gcs  silt l4-s1  2+ dp pulse

## 2018-04-28 NOTE — PROGRESS NOTE ADULT - SUBJECTIVE AND OBJECTIVE BOX
SUBJECTIVE:  Doing well.   No overnight events.     OBJECTIVE:     ** VITAL SIGNS / I&O's **    T(C): 36.9 (04-28-18 @ 05:33), Max: 36.9 (04-28-18 @ 05:33)  T(F): 98.4 (04-28-18 @ 05:33), Max: 98.4 (04-28-18 @ 05:33)  HR: 75 (04-28-18 @ 05:33) (75 - 86)  BP: 136/80 (04-28-18 @ 05:33) (121/71 - 147/67)  RR: 20 (04-28-18 @ 05:33) (19 - 20)  SpO2: 93% (04-28-18 @ 05:33) (93% - 97%)      27 Apr 2018 07:01  -  28 Apr 2018 07:00  --------------------------------------------------------  IN:    IV PiggyBack: 50 mL    Oral Fluid: 280 mL  Total IN: 330 mL    OUT:  Total OUT: 0 mL    Total NET: 330 mL          ** PHYSICAL EXAM **    -- CONSTITUTIONAL: AOx3. NAD.   --General: Awake and alert, NAD  RLE: knee eschar stable,  proximal incision intact.  most proximal wound evolving  ** LABS **       135    |  96     |  8      ----------------------------<  94         ( 28 Apr 2018 06:59 )  3.9     |  29     |  0.63     Ca    8.7        ( 28 Apr 2018 06:59 )          CAPILLARY BLOOD GLUCOSE

## 2018-04-28 NOTE — PROGRESS NOTE ADULT - ASSESSMENT
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    # Fever, resolved  # Recent septic Rt TKR - abx spacer exchanged and plastics complex wound closure  # Aspiration PNA, resolved  # Supratherapeutic INR/coagulopathy, resolved  # PMR on chronic steroids  # thigh hematoma, leg wound  #lower abd wound    Plan:  remains afebrile, cont Dapto per ID until 5/5, BC NTD, appreciate ID recs  h/h stable  appreciate plastics surgery recs, local wound care, to stabilize wound before considering rehab  wound care c/s appreciated  will cont coumadin and asa  pain control, titrate to PO  PT OOB    plan of care dw patient, NP

## 2018-04-28 NOTE — PROGRESS NOTE ADULT - ASSESSMENT
no plan for orthopedic surgical intervention per family and patient preference  local wound care per plastics and wound care team  continue IV antibiotic treatment per infectious disease protocol  encourage her to spend majority of bed oob in bedside chair as able  PT to help mobilize, she must remain 50% wb on the RLE and NO KNEE MOTION allowed, knee immobilizer if oob  coumadin, inr goal 2-3  continue to optimize nutrition  continue to involve psych    Nate Bass MD  Attending Orthopedic Surgeon

## 2018-04-28 NOTE — PROGRESS NOTE ADULT - ATTENDING COMMENTS
plan to dc to rehab on hold-pending plastics wound care plan    Reina Murphy MD ( [facility])  [phone]

## 2018-04-29 LAB
CULTURE RESULTS: SIGNIFICANT CHANGE UP
SPECIMEN SOURCE: SIGNIFICANT CHANGE UP

## 2018-04-29 RX ORDER — WARFARIN SODIUM 2.5 MG/1
0.5 TABLET ORAL ONCE
Qty: 0 | Refills: 0 | Status: COMPLETED | OUTPATIENT
Start: 2018-04-29 | End: 2018-04-29

## 2018-04-29 RX ADMIN — Medication 1 APPLICATION(S): at 15:13

## 2018-04-29 RX ADMIN — Medication 1 TABLET(S): at 21:01

## 2018-04-29 RX ADMIN — Medication 1 TABLET(S): at 15:12

## 2018-04-29 RX ADMIN — HYDROMORPHONE HYDROCHLORIDE 0.5 MILLIGRAM(S): 2 INJECTION INTRAMUSCULAR; INTRAVENOUS; SUBCUTANEOUS at 09:40

## 2018-04-29 RX ADMIN — WARFARIN SODIUM 0.5 MILLIGRAM(S): 2.5 TABLET ORAL at 21:01

## 2018-04-29 RX ADMIN — HYDROMORPHONE HYDROCHLORIDE 0.5 MILLIGRAM(S): 2 INJECTION INTRAMUSCULAR; INTRAVENOUS; SUBCUTANEOUS at 20:54

## 2018-04-29 RX ADMIN — Medication 500 MILLIGRAM(S): at 12:26

## 2018-04-29 RX ADMIN — Medication 1 TABLET(S): at 12:30

## 2018-04-29 RX ADMIN — OXYCODONE HYDROCHLORIDE 2.5 MILLIGRAM(S): 5 TABLET ORAL at 12:27

## 2018-04-29 RX ADMIN — Medication 1 TABLET(S): at 05:29

## 2018-04-29 RX ADMIN — HYDROMORPHONE HYDROCHLORIDE 0.5 MILLIGRAM(S): 2 INJECTION INTRAMUSCULAR; INTRAVENOUS; SUBCUTANEOUS at 14:00

## 2018-04-29 RX ADMIN — POLYETHYLENE GLYCOL 3350 17 GRAM(S): 17 POWDER, FOR SOLUTION ORAL at 12:30

## 2018-04-29 RX ADMIN — Medication 1 APPLICATION(S): at 05:28

## 2018-04-29 RX ADMIN — DAPTOMYCIN 132 MILLIGRAM(S): 500 INJECTION, POWDER, LYOPHILIZED, FOR SOLUTION INTRAVENOUS at 08:32

## 2018-04-29 RX ADMIN — Medication 3 MILLIGRAM(S): at 21:01

## 2018-04-29 RX ADMIN — Medication 325 MILLIGRAM(S): at 12:26

## 2018-04-29 RX ADMIN — BUDESONIDE AND FORMOTEROL FUMARATE DIHYDRATE 2 PUFF(S): 160; 4.5 AEROSOL RESPIRATORY (INHALATION) at 17:53

## 2018-04-29 RX ADMIN — WARFARIN SODIUM 0.5 MILLIGRAM(S): 2.5 TABLET ORAL at 00:08

## 2018-04-29 RX ADMIN — Medication 500000 UNIT(S): at 05:28

## 2018-04-29 RX ADMIN — Medication 1 APPLICATION(S): at 05:29

## 2018-04-29 RX ADMIN — HYDROMORPHONE HYDROCHLORIDE 0.5 MILLIGRAM(S): 2 INJECTION INTRAMUSCULAR; INTRAVENOUS; SUBCUTANEOUS at 08:32

## 2018-04-29 RX ADMIN — Medication 1 APPLICATION(S): at 12:34

## 2018-04-29 RX ADMIN — Medication 500000 UNIT(S): at 21:01

## 2018-04-29 RX ADMIN — TIOTROPIUM BROMIDE 1 CAPSULE(S): 18 CAPSULE ORAL; RESPIRATORY (INHALATION) at 12:35

## 2018-04-29 RX ADMIN — BUDESONIDE AND FORMOTEROL FUMARATE DIHYDRATE 2 PUFF(S): 160; 4.5 AEROSOL RESPIRATORY (INHALATION) at 05:29

## 2018-04-29 RX ADMIN — PANTOPRAZOLE SODIUM 40 MILLIGRAM(S): 20 TABLET, DELAYED RELEASE ORAL at 05:31

## 2018-04-29 RX ADMIN — HYDROMORPHONE HYDROCHLORIDE 0.5 MILLIGRAM(S): 2 INJECTION INTRAMUSCULAR; INTRAVENOUS; SUBCUTANEOUS at 15:09

## 2018-04-29 RX ADMIN — ZINC OXIDE 1 APPLICATION(S): 200 OINTMENT TOPICAL at 12:35

## 2018-04-29 RX ADMIN — HYDROMORPHONE HYDROCHLORIDE 0.5 MILLIGRAM(S): 2 INJECTION INTRAMUSCULAR; INTRAVENOUS; SUBCUTANEOUS at 02:58

## 2018-04-29 RX ADMIN — Medication 1 APPLICATION(S): at 21:02

## 2018-04-29 RX ADMIN — Medication 1 MILLIGRAM(S): at 12:25

## 2018-04-29 RX ADMIN — Medication 100 MILLIGRAM(S): at 21:00

## 2018-04-29 RX ADMIN — Medication 500000 UNIT(S): at 12:23

## 2018-04-29 RX ADMIN — OXYCODONE HYDROCHLORIDE 2.5 MILLIGRAM(S): 5 TABLET ORAL at 13:27

## 2018-04-29 RX ADMIN — HYDROMORPHONE HYDROCHLORIDE 0.5 MILLIGRAM(S): 2 INJECTION INTRAMUSCULAR; INTRAVENOUS; SUBCUTANEOUS at 21:30

## 2018-04-29 RX ADMIN — Medication 1 APPLICATION(S): at 17:52

## 2018-04-29 RX ADMIN — Medication 100 MILLIGRAM(S): at 15:12

## 2018-04-29 RX ADMIN — Medication 81 MILLIGRAM(S): at 12:32

## 2018-04-29 RX ADMIN — Medication 500000 UNIT(S): at 15:12

## 2018-04-29 RX ADMIN — Medication 100 MILLIGRAM(S): at 05:29

## 2018-04-29 RX ADMIN — HYDROMORPHONE HYDROCHLORIDE 0.5 MILLIGRAM(S): 2 INJECTION INTRAMUSCULAR; INTRAVENOUS; SUBCUTANEOUS at 02:29

## 2018-04-29 NOTE — PROGRESS NOTE ADULT - ATTENDING COMMENTS
plan to dc to rehab on hold-pending plastics wound care plan    Reina Murphy MD ( [facility])  [phone]

## 2018-04-29 NOTE — PROGRESS NOTE ADULT - ASSESSMENT
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    # Fever, resolved  # Recent septic Rt TKR - abx spacer exchanged and plastics complex wound closure  # Aspiration PNA, resolved  # Supratherapeutic INR/coagulopathy, resolved  # PMR on chronic steroids  # thigh hematoma, leg wound  #lower abd wound    Plan:  remains afebrile, cont Dapto per ID until 5/5, BC NTD, appreciate ID recs  h/h stable  appreciate plastics surgery recs, local wound care, to stabilize wound before considering rehab  wound care c/s appreciated  will cont coumadin and asa  pain control, titrate to PO  PT OOB    plan of care dw patient, NP

## 2018-04-29 NOTE — PROGRESS NOTE ADULT - SUBJECTIVE AND OBJECTIVE BOX
Patient is a 77y old  Female who presents with a chief complaint of fever (11 Apr 2018 14:25)  pt seen and examined at bedside   SUBJECTIVE/OVERNIGHT events noted,no new complaints    Vital Signs Last 24 Hrs  T(C): 36.6 (29 Apr 2018 16:23), Max: 37.1 (28 Apr 2018 22:23)  T(F): 97.9 (29 Apr 2018 16:23), Max: 98.8 (28 Apr 2018 22:23)  HR: 82 (29 Apr 2018 16:23) (81 - 97)  BP: 127/59 (29 Apr 2018 16:23) (116/65 - 149/72)  BP(mean): --  RR: 18 (29 Apr 2018 16:23) (18 - 18)  SpO2: 93% (29 Apr 2018 16:23) (92% - 96%)  CAPILLARY BLOOD GLUCOSE        MEDICATIONS  (STANDING):  ALPRAZolam 0.25 milliGRAM(s) Oral once  ascorbic acid 500 milliGRAM(s) Oral daily  aspirin enteric coated 81 milliGRAM(s) Oral daily  buDESOnide 160 MICROgram(s)/formoterol 4.5 MICROgram(s) Inhaler 2 Puff(s) Inhalation two times a day  calcium carbonate 1250 mG + Vitamin D (OsCal 500 + D) 1 Tablet(s) Oral three times a day  Dakins Solution - 1/4 Strength 1 Application(s) Topical two times a day  DAPTOmycin IVPB 800 milliGRAM(s) IV Intermittent every 24 hours  docusate sodium 100 milliGRAM(s) Oral three times a day  ferrous    sulfate 325 milliGRAM(s) Oral daily  folic acid 1 milliGRAM(s) Oral daily  melatonin 3 milliGRAM(s) Oral at bedtime  multivitamin 1 Tablet(s) Oral daily  nystatin    Suspension 716632 Unit(s) Oral every 6 hours  pantoprazole    Tablet 40 milliGRAM(s) Oral before breakfast  polyethylene glycol 3350 17 Gram(s) Oral daily  povidone iodine 10% Solution 1 Application(s) Topical three times a day  silver sulfADIAZINE 1% Cream 1 Application(s) Topical daily  sodium chloride 0.9%. 1000 milliLiter(s) (75 mL/Hr) IV Continuous <Continuous>  tiotropium 18 MICROgram(s) Capsule 1 Capsule(s) Inhalation daily  warfarin 0.5 milliGRAM(s) Oral once  zinc oxide 20% Ointment 1 Application(s) Topical daily    MEDICATIONS  (PRN):  acetaminophen   Tablet 650 milliGRAM(s) Oral every 6 hours PRN For Temp greater than 38 C (100.4 F)  acetaminophen   Tablet. 650 milliGRAM(s) Oral every 6 hours PRN Mild Pain (1 - 3)  HYDROmorphone  Injectable 0.5 milliGRAM(s) IV Push every 6 hours PRN Severe Pain (7 - 10)  lidocaine 2% Viscous 10 milliLiter(s) Swish and Spit every 6 hours PRN Mouth Sores  oxyCODONE    IR 2.5 milliGRAM(s) Oral every 4 hours PRN Moderate Pain (4 - 6)  oxyCODONE    IR 2.5 milliGRAM(s) Oral once PRN Moderate Pain (4 - 6)  senna 2 Tablet(s) Oral at bedtime PRN Constipation    PHYSICAL EXAM  General : comfortable, not in any acute distress  Neck : supple, no LAD, no JVD  Eyes : EOMI, PERRLA, conjunctiva : no icterus, no pallor  MM moist, no pharyngeal erythema/exudates  Pulmonary: clear to auscultation bilateral lung fields, no crackles/rhonchi/wheezing,   CVS : S1 S2,rate normal-,rhythm-regular, no murmurs, no abnormal sounds  Gastrointestinal: soft, non tender, non distended, no organomegaly , bowel sounds audible  Extremities: rt LE wound dressing  Neuro: AAOx3, no focal deficits  Skin: no rash  LABS                        9.0    11.01 )-----------( 304      ( 28 Apr 2018 08:23 )             29.4     04-28    135  |  96  |  8   ----------------------------<  94  3.9   |  29  |  0.63    Ca    8.7      28 Apr 2018 06:59        Culture - Other (collected 27 Apr 2018 18:18)  Source: Skin abdominal wound  Preliminary Report (28 Apr 2018 16:38):    No growth to date.      RADIOLOGY and IMAGING reviewed: yes  Consultant notes reviewed : yes  Care discussed with Consultants/other providers :

## 2018-04-29 NOTE — PROGRESS NOTE ADULT - SUBJECTIVE AND OBJECTIVE BOX
SUBJECTIVE:  Doing well.   No overnight events. appplication of dakins to knee and collagenase to abd.     OBJECTIVE:     ** VITAL SIGNS / I&O's **    T(C): 36.9 (04-28-18 @ 05:33), Max: 36.9 (04-28-18 @ 05:33)  T(F): 98.4 (04-28-18 @ 05:33), Max: 98.4 (04-28-18 @ 05:33)  HR: 75 (04-28-18 @ 05:33) (75 - 86)  BP: 136/80 (04-28-18 @ 05:33) (121/71 - 147/67)  RR: 20 (04-28-18 @ 05:33) (19 - 20)  SpO2: 93% (04-28-18 @ 05:33) (93% - 97%)      27 Apr 2018 07:01  -  28 Apr 2018 07:00  --------------------------------------------------------  IN:    IV PiggyBack: 50 mL    Oral Fluid: 280 mL  Total IN: 330 mL    OUT:  Total OUT: 0 mL    Total NET: 330 mL          ** PHYSICAL EXAM **    -- CONSTITUTIONAL: AOx3. NAD.   --General: Awake and alert, NAD  RLE: knee eschar stable,  proximal incision intact.  most proximal wound evolving  ** LABS **       135    |  96     |  8      ----------------------------<  94         ( 28 Apr 2018 06:59 )  3.9     |  29     |  0.63     Ca    8.7        ( 28 Apr 2018 06:59 )          CAPILLARY BLOOD GLUCOSE

## 2018-04-29 NOTE — PROGRESS NOTE ADULT - ASSESSMENT
-cont local wound care : collagenase to R abd wound BID and wet to dry w/ dakins BID to RLE wound/eschar for 2 days total; then will switch to wet to dry for both  - will cont to follow while in house

## 2018-04-30 LAB
ANION GAP SERPL CALC-SCNC: 10 MMOL/L — SIGNIFICANT CHANGE UP (ref 5–17)
BUN SERPL-MCNC: 9 MG/DL — SIGNIFICANT CHANGE UP (ref 7–23)
CALCIUM SERPL-MCNC: 8.9 MG/DL — SIGNIFICANT CHANGE UP (ref 8.4–10.5)
CHLORIDE SERPL-SCNC: 98 MMOL/L — SIGNIFICANT CHANGE UP (ref 96–108)
CO2 SERPL-SCNC: 29 MMOL/L — SIGNIFICANT CHANGE UP (ref 22–31)
CREAT SERPL-MCNC: 0.66 MG/DL — SIGNIFICANT CHANGE UP (ref 0.5–1.3)
GLUCOSE SERPL-MCNC: 100 MG/DL — HIGH (ref 70–99)
HCT VFR BLD CALC: 28 % — LOW (ref 34.5–45)
HGB BLD-MCNC: 8.6 G/DL — LOW (ref 11.5–15.5)
INR BLD: 2 RATIO — HIGH (ref 0.88–1.16)
MCHC RBC-ENTMCNC: 27.7 PG — SIGNIFICANT CHANGE UP (ref 27–34)
MCHC RBC-ENTMCNC: 30.7 GM/DL — LOW (ref 32–36)
MCV RBC AUTO: 90.3 FL — SIGNIFICANT CHANGE UP (ref 80–100)
PLATELET # BLD AUTO: 331 K/UL — SIGNIFICANT CHANGE UP (ref 150–400)
POTASSIUM SERPL-MCNC: 3.7 MMOL/L — SIGNIFICANT CHANGE UP (ref 3.5–5.3)
POTASSIUM SERPL-SCNC: 3.7 MMOL/L — SIGNIFICANT CHANGE UP (ref 3.5–5.3)
PROTHROM AB SERPL-ACNC: 22.9 SEC — HIGH (ref 10–13.1)
RBC # BLD: 3.1 M/UL — LOW (ref 3.8–5.2)
RBC # FLD: 17 % — HIGH (ref 10.3–14.5)
SODIUM SERPL-SCNC: 137 MMOL/L — SIGNIFICANT CHANGE UP (ref 135–145)
WBC # BLD: 8.92 K/UL — SIGNIFICANT CHANGE UP (ref 3.8–10.5)
WBC # FLD AUTO: 8.92 K/UL — SIGNIFICANT CHANGE UP (ref 3.8–10.5)

## 2018-04-30 PROCEDURE — 99232 SBSQ HOSP IP/OBS MODERATE 35: CPT

## 2018-04-30 RX ORDER — WARFARIN SODIUM 2.5 MG/1
1 TABLET ORAL ONCE
Qty: 0 | Refills: 0 | Status: COMPLETED | OUTPATIENT
Start: 2018-04-30 | End: 2018-04-30

## 2018-04-30 RX ADMIN — Medication 1 TABLET(S): at 12:50

## 2018-04-30 RX ADMIN — DAPTOMYCIN 132 MILLIGRAM(S): 500 INJECTION, POWDER, LYOPHILIZED, FOR SOLUTION INTRAVENOUS at 08:00

## 2018-04-30 RX ADMIN — HYDROMORPHONE HYDROCHLORIDE 0.5 MILLIGRAM(S): 2 INJECTION INTRAMUSCULAR; INTRAVENOUS; SUBCUTANEOUS at 03:17

## 2018-04-30 RX ADMIN — HYDROMORPHONE HYDROCHLORIDE 0.5 MILLIGRAM(S): 2 INJECTION INTRAMUSCULAR; INTRAVENOUS; SUBCUTANEOUS at 21:50

## 2018-04-30 RX ADMIN — Medication 1 APPLICATION(S): at 05:04

## 2018-04-30 RX ADMIN — PANTOPRAZOLE SODIUM 40 MILLIGRAM(S): 20 TABLET, DELAYED RELEASE ORAL at 05:03

## 2018-04-30 RX ADMIN — HYDROMORPHONE HYDROCHLORIDE 0.5 MILLIGRAM(S): 2 INJECTION INTRAMUSCULAR; INTRAVENOUS; SUBCUTANEOUS at 15:15

## 2018-04-30 RX ADMIN — Medication 500000 UNIT(S): at 03:19

## 2018-04-30 RX ADMIN — HYDROMORPHONE HYDROCHLORIDE 0.5 MILLIGRAM(S): 2 INJECTION INTRAMUSCULAR; INTRAVENOUS; SUBCUTANEOUS at 09:37

## 2018-04-30 RX ADMIN — Medication 100 MILLIGRAM(S): at 05:03

## 2018-04-30 RX ADMIN — Medication 500 MILLIGRAM(S): at 12:50

## 2018-04-30 RX ADMIN — OXYCODONE HYDROCHLORIDE 2.5 MILLIGRAM(S): 5 TABLET ORAL at 09:00

## 2018-04-30 RX ADMIN — BUDESONIDE AND FORMOTEROL FUMARATE DIHYDRATE 2 PUFF(S): 160; 4.5 AEROSOL RESPIRATORY (INHALATION) at 05:02

## 2018-04-30 RX ADMIN — Medication 100 MILLIGRAM(S): at 21:19

## 2018-04-30 RX ADMIN — Medication 500000 UNIT(S): at 17:26

## 2018-04-30 RX ADMIN — Medication 1 APPLICATION(S): at 12:51

## 2018-04-30 RX ADMIN — Medication 1 TABLET(S): at 21:19

## 2018-04-30 RX ADMIN — HYDROMORPHONE HYDROCHLORIDE 0.5 MILLIGRAM(S): 2 INJECTION INTRAMUSCULAR; INTRAVENOUS; SUBCUTANEOUS at 09:17

## 2018-04-30 RX ADMIN — Medication 1 MILLIGRAM(S): at 12:50

## 2018-04-30 RX ADMIN — Medication 1 TABLET(S): at 12:55

## 2018-04-30 RX ADMIN — Medication 500000 UNIT(S): at 21:20

## 2018-04-30 RX ADMIN — ZINC OXIDE 1 APPLICATION(S): 200 OINTMENT TOPICAL at 12:59

## 2018-04-30 RX ADMIN — Medication 500000 UNIT(S): at 12:51

## 2018-04-30 RX ADMIN — OXYCODONE HYDROCHLORIDE 2.5 MILLIGRAM(S): 5 TABLET ORAL at 08:00

## 2018-04-30 RX ADMIN — Medication 1 TABLET(S): at 05:04

## 2018-04-30 RX ADMIN — Medication 81 MILLIGRAM(S): at 12:50

## 2018-04-30 RX ADMIN — Medication 1 APPLICATION(S): at 21:20

## 2018-04-30 RX ADMIN — TIOTROPIUM BROMIDE 1 CAPSULE(S): 18 CAPSULE ORAL; RESPIRATORY (INHALATION) at 12:50

## 2018-04-30 RX ADMIN — POLYETHYLENE GLYCOL 3350 17 GRAM(S): 17 POWDER, FOR SOLUTION ORAL at 12:50

## 2018-04-30 RX ADMIN — HYDROMORPHONE HYDROCHLORIDE 0.5 MILLIGRAM(S): 2 INJECTION INTRAMUSCULAR; INTRAVENOUS; SUBCUTANEOUS at 21:18

## 2018-04-30 RX ADMIN — HYDROMORPHONE HYDROCHLORIDE 0.5 MILLIGRAM(S): 2 INJECTION INTRAMUSCULAR; INTRAVENOUS; SUBCUTANEOUS at 03:50

## 2018-04-30 RX ADMIN — Medication 3 MILLIGRAM(S): at 21:19

## 2018-04-30 RX ADMIN — OXYCODONE HYDROCHLORIDE 2.5 MILLIGRAM(S): 5 TABLET ORAL at 14:05

## 2018-04-30 RX ADMIN — BUDESONIDE AND FORMOTEROL FUMARATE DIHYDRATE 2 PUFF(S): 160; 4.5 AEROSOL RESPIRATORY (INHALATION) at 17:26

## 2018-04-30 RX ADMIN — Medication 100 MILLIGRAM(S): at 12:55

## 2018-04-30 RX ADMIN — OXYCODONE HYDROCHLORIDE 2.5 MILLIGRAM(S): 5 TABLET ORAL at 15:05

## 2018-04-30 RX ADMIN — WARFARIN SODIUM 1 MILLIGRAM(S): 2.5 TABLET ORAL at 21:19

## 2018-04-30 RX ADMIN — Medication 325 MILLIGRAM(S): at 12:49

## 2018-04-30 NOTE — PROGRESS NOTE ADULT - SUBJECTIVE AND OBJECTIVE BOX
SUBJECTIVE: Pt seen, chart reviewed.          aspirin enteric coated 81 milliGRAM(s) Oral daily  DAPTOmycin IVPB 800 milliGRAM(s) IV Intermittent every 24 hours  nystatin    Suspension 277513 Unit(s) Oral every 6 hours  warfarin 1 milliGRAM(s) Oral once      	    Physical Exam:  Vital Signs Last 24 Hrs  T(C): 36.5 (30 Apr 2018 13:57), Max: 36.8 (29 Apr 2018 23:36)  T(F): 97.7 (30 Apr 2018 13:57), Max: 98.3 (30 Apr 2018 04:22)  HR: 84 (30 Apr 2018 13:57) (76 - 84)  BP: 120/76 (30 Apr 2018 13:57) (120/76 - 137/56)  BP(mean): --  RR: 18 (30 Apr 2018 13:57) (18 - 18)  SpO2: 93% (30 Apr 2018 13:57) (93% - 97%)        LABS:                        8.6    8.92  )-----------( 331      ( 30 Apr 2018 07:50 )             28.0     PT/INR - ( 30 Apr 2018 07:50 )   PT: 22.9 sec;   INR: 2.00 ratio

## 2018-04-30 NOTE — PROGRESS NOTE ADULT - ASSESSMENT
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    # Fever, resolved  # Recent septic Rt TKR - abx spacer exchanged and plastics complex wound closure  # Aspiration PNA, resolved  # Supratherapeutic INR/coagulopathy, resolved  # PMR on chronic steroids  # thigh hematoma, leg wound  #lower abd wound    Plan:  remains afebrile, cont Dapto per ID until 5/5, BC NTD, appreciate ID recs  h/h stable  appreciate plastics surgery recs, local wound care, to stabilize wound before considering rehab  wound care c/s appreciated  will cont coumadin and asa  pain control, titrate to PO  PT OOB  Constipation ccont bowel regimen    plan of care dw patient, NP

## 2018-04-30 NOTE — PROGRESS NOTE ADULT - SUBJECTIVE AND OBJECTIVE BOX
CC: f/u for septic knee, pneumonia    Patient reports her dressings were just changed and she is hungry    REVIEW OF SYSTEMS:  All other review of systems negative (Comprehensive ROS)    Antimicrobials Day #  :37/42  DAPTOmycin IVPB 800 milliGRAM(s) IV Intermittent every 24 hours  nystatin    Suspension 867873 Unit(s) Oral every 6 hours    Other Medications Reviewed    T(F): 98.3 (04-30-18 @ 04:22), Max: 98.3 (04-30-18 @ 04:22)  HR: 76 (04-30-18 @ 04:22)  BP: 130/76 (04-30-18 @ 04:22)  RR: 18 (04-30-18 @ 04:22)  SpO2: 94% (04-30-18 @ 04:22)  Wt(kg): --    PHYSICAL EXAM:  General: alert, no acute distress  Eyes:  anicteric, no conjunctival injection, no discharge  Oropharynx: no lesions or injection 	  Neck: supple, without adenopathy  Lungs: clear to auscultation  Heart: regular rate and rhythm; no murmur, rubs or gallops  Abdomen: soft, nondistended, nontender, without mass or organomegaly. wound packed no redness  Skin: no lesions  Extremities: right thigh with fat necrosis no induration. right knee thick eschar over wound  Neurologic: alert, oriented, moves all extremities    LAB RESULTS:                        8.6    8.92  )-----------( 331      ( 30 Apr 2018 07:50 )             28.0     04-30    137  |  98  |  9   ----------------------------<  100<H>  3.7   |  29  |  0.66    Ca    8.9      30 Apr 2018 06:06          MICROBIOLOGY:  RECENT CULTURES:  04-27 @ 18:18 Skin abdominal wound     No growth at 48 hours          RADIOLOGY REVIEWED:    < from: CT Abdomen and Pelvis No Cont (04.19.18 @ 19:06) >    IMPRESSION:   Slight interval improvement in bibasilar pneumonia.  Small amount of lenticular-shaped fluid between the right and the left   hepatic lobes.  Complex left renal lesion not well characterized on this exam but     < end of copied text >      < from: CT Femur No Cont, Right (04.19.18 @ 19:06) >    IMPRESSION:  1.  Patient status post explantation of right total knee arthroplasty and   washout. Antibiotic cement and intramedullary ruthann are identified in the   right knee joint. No evidence of acute hardware complication.  2.  No focal, drainable fluid collections are identified on today's   examination.  3.  Nonspecific subcutaneous edema involving the patient's pannus and   subcutaneous fat of the medial thigh.      < end of copied text >        Assessment:  Patient with right tkr septic arthritis initially with strept so had rudy and spacer and wound failed to heal so had spacer exchange and grew mrsa with wound still having an eschar, developed a hematoma of the thigh that is not acting infected and has fat necrosis and finished treatment for pneumonia.  Plan: 5 more days of daptomycin then po suppressive minocycline 100mg po bid  local care to wounds per plastics

## 2018-04-30 NOTE — PROGRESS NOTE ADULT - ASSESSMENT
Plan:  -WTD to medial eschar  -collagenase to proximal thigh wound  -collagenase to small abdominal pannus wound

## 2018-04-30 NOTE — CHART NOTE - NSCHARTNOTEFT_GEN_A_CORE
Source: Patient [ X]    Family [ ]     other [X ] medical record, RN     Diet : Mechanical Soft, Nepro-2 per day (Provides additional 850kcal, 38grams protein per day)     Pt seen for nutrition follow up. Medical chart reviewed/events noted. Pt readmitted from [facility] with knee joint infection. Pt is S/P MBS, recommended for mechanical soft diet. Pt reports good appetite and intake improving but dislikes modified consistency food. Pt consuming % meals  and Nepro- 1 can/day (provides additional 425cal, 19gm protein) daily for the past week. Obtained food preferences. Pt denies GI distress, last BM today. Pt declined to have any nutrition-related questions/concerns at this time. Pt made aware RD remains available.      PO intake:  < 50% [ ] 50-75% [ ]   % [x ]  other :     Source for PO intake [ x] Patient [ ] family [X ] chart [ ] staff [ ] other      Current Weight: (4/30) 239.2 pounds - weight trending down from dosing weight 258 pounds, likely fluid shifts as pt with less edema (from moderate to mild) and has been eating well and consuming nutrition supplements.        Pertinent Medications: MEDICATIONS  (STANDING):  ALPRAZolam 0.25 milliGRAM(s) Oral once  ascorbic acid 500 milliGRAM(s) Oral daily  aspirin enteric coated 81 milliGRAM(s) Oral daily  buDESOnide 160 MICROgram(s)/formoterol 4.5 MICROgram(s) Inhaler 2 Puff(s) Inhalation two times a day  calcium carbonate 1250 mG + Vitamin D (OsCal 500 + D) 1 Tablet(s) Oral three times a day  DAPTOmycin IVPB 800 milliGRAM(s) IV Intermittent every 24 hours  docusate sodium 100 milliGRAM(s) Oral three times a day  ferrous    sulfate 325 milliGRAM(s) Oral daily  folic acid 1 milliGRAM(s) Oral daily  melatonin 3 milliGRAM(s) Oral at bedtime  multivitamin 1 Tablet(s) Oral daily  nystatin    Suspension 514126 Unit(s) Oral every 6 hours  pantoprazole    Tablet 40 milliGRAM(s) Oral before breakfast  polyethylene glycol 3350 17 Gram(s) Oral daily  povidone iodine 10% Solution 1 Application(s) Topical three times a day  sodium chloride 0.9%. 1000 milliLiter(s) (75 mL/Hr) IV Continuous <Continuous>  tiotropium 18 MICROgram(s) Capsule 1 Capsule(s) Inhalation daily  warfarin 1 milliGRAM(s) Oral once  zinc oxide 20% Ointment 1 Application(s) Topical daily    MEDICATIONS  (PRN):  acetaminophen   Tablet 650 milliGRAM(s) Oral every 6 hours PRN For Temp greater than 38 C (100.4 F)  acetaminophen   Tablet. 650 milliGRAM(s) Oral every 6 hours PRN Mild Pain (1 - 3)  HYDROmorphone  Injectable 0.5 milliGRAM(s) IV Push every 6 hours PRN Severe Pain (7 - 10)  lidocaine 2% Viscous 10 milliLiter(s) Swish and Spit every 6 hours PRN Mouth Sores  oxyCODONE    IR 2.5 milliGRAM(s) Oral every 4 hours PRN Moderate Pain (4 - 6)  oxyCODONE    IR 2.5 milliGRAM(s) Oral once PRN Moderate Pain (4 - 6)  senna 2 Tablet(s) Oral at bedtime PRN Constipation    Pertinent Labs:  04-30 Na137 mmol/L Glu 100 mg/dL<H> K+ 3.7 mmol/L Cr  0.66 mg/dL BUN 9 mg/dL      Skin: +1 generalized edema, +2 bilateral foot, leg, knee edema. Bilateral heel DTI.     Estimated Needs:   [x ] no change since previous assessment  [ ] recalculated:       Previous Nutrition Diagnosis:      [x ] Unintended Weight Loss, being addressed with feeding assistance and nutrition supplements         New Nutrition Diagnosis: [ x] not applicable        Recommend    [ ] Change Diet To:    [x ] Nutrition Supplement: Continue Nepro-2 per day (Provides additional 850kcal, 38grams protein per day)     [ ] Nutrition Support    [X ] Other: Continue to provide feeding assistance as needed        Monitoring and Evaluation:     1.Continue to monitor weight, po intake, labs, and GI tolerance  2. Encourage adequate po intake   3. RD remains available.     Valerie Solares RD pager #[phone]

## 2018-04-30 NOTE — PROGRESS NOTE ADULT - ATTENDING COMMENTS
plan to dc to rehab on hold-pending plastics wound care plan    Reina Murphy MD ( [facility])  [phone]

## 2018-04-30 NOTE — PROGRESS NOTE ADULT - SUBJECTIVE AND OBJECTIVE BOX
pain controlled, awake and alert  afebrile    RLE  thigh with fat necrosis, stable, further demarkating, no surrounding erythema, dressed by prs  surgical wound intact with stable eschar, some separation of eschar medially from skin, no further exudate, dressed by prs  5/5 ta/ehl/gcs  silt l4-s1  2+ dp pulse

## 2018-04-30 NOTE — PROGRESS NOTE ADULT - SUBJECTIVE AND OBJECTIVE BOX
Plastic Surgery Progress Note    S: Patient seen and examined.  no new events    Vital Signs Last 24 Hrs  T(C): 36.8 (30 Apr 2018 04:22), Max: 36.8 (29 Apr 2018 23:36)  T(F): 98.3 (30 Apr 2018 04:22), Max: 98.3 (30 Apr 2018 04:22)  HR: 76 (30 Apr 2018 04:22) (76 - 83)  BP: 130/76 (30 Apr 2018 04:22) (119/71 - 137/56)  BP(mean): --  RR: 18 (30 Apr 2018 04:22) (18 - 18)  SpO2: 94% (30 Apr 2018 04:22) (92% - 97%)  I&O's Detail    29 Apr 2018 07:01  -  30 Apr 2018 07:00  --------------------------------------------------------  IN:    IV PiggyBack: 50 mL  Total IN: 50 mL    OUT:    Voided: 100 mL  Total OUT: 100 mL    Total NET: -50 mL          Physical Exam:  General: Awake and alert, NAD  RLE: exam stable

## 2018-04-30 NOTE — PROGRESS NOTE ADULT - SUBJECTIVE AND OBJECTIVE BOX
Patient is a 77y old  Female who presents with a chief complaint of fever (11 Apr 2018 14:25)      INTERVAL HPI/OVERNIGHT EVENTS:noted, feels well  plastics and wound care dressing today  pain well controlled with dilaudid  no bm for 2d    Vital Signs Last 24 Hrs  T(C): 36.5 (30 Apr 2018 13:57), Max: 36.8 (29 Apr 2018 23:36)  T(F): 97.7 (30 Apr 2018 13:57), Max: 98.3 (30 Apr 2018 04:22)  HR: 84 (30 Apr 2018 13:57) (76 - 84)  BP: 120/76 (30 Apr 2018 13:57) (120/76 - 137/56)  BP(mean): --  RR: 18 (30 Apr 2018 13:57) (18 - 18)  SpO2: 93% (30 Apr 2018 13:57) (93% - 97%)    acetaminophen   Tablet 650 milliGRAM(s) Oral every 6 hours PRN  acetaminophen   Tablet. 650 milliGRAM(s) Oral every 6 hours PRN  ALPRAZolam 0.25 milliGRAM(s) Oral once  ascorbic acid 500 milliGRAM(s) Oral daily  aspirin enteric coated 81 milliGRAM(s) Oral daily  buDESOnide 160 MICROgram(s)/formoterol 4.5 MICROgram(s) Inhaler 2 Puff(s) Inhalation two times a day  calcium carbonate 1250 mG + Vitamin D (OsCal 500 + D) 1 Tablet(s) Oral three times a day  DAPTOmycin IVPB 800 milliGRAM(s) IV Intermittent every 24 hours  docusate sodium 100 milliGRAM(s) Oral three times a day  ferrous    sulfate 325 milliGRAM(s) Oral daily  folic acid 1 milliGRAM(s) Oral daily  HYDROmorphone  Injectable 0.5 milliGRAM(s) IV Push every 6 hours PRN  lidocaine 2% Viscous 10 milliLiter(s) Swish and Spit every 6 hours PRN  melatonin 3 milliGRAM(s) Oral at bedtime  multivitamin 1 Tablet(s) Oral daily  nystatin    Suspension 532532 Unit(s) Oral every 6 hours  oxyCODONE    IR 2.5 milliGRAM(s) Oral every 4 hours PRN  oxyCODONE    IR 2.5 milliGRAM(s) Oral once PRN  pantoprazole    Tablet 40 milliGRAM(s) Oral before breakfast  polyethylene glycol 3350 17 Gram(s) Oral daily  povidone iodine 10% Solution 1 Application(s) Topical three times a day  senna 2 Tablet(s) Oral at bedtime PRN  sodium chloride 0.9%. 1000 milliLiter(s) IV Continuous <Continuous>  tiotropium 18 MICROgram(s) Capsule 1 Capsule(s) Inhalation daily  warfarin 1 milliGRAM(s) Oral once  zinc oxide 20% Ointment 1 Application(s) Topical daily      PHYSICAL EXAM:  GENERAL: NAD,   EYES: conjunctiva and sclera clear  ENMT: Moist mucous membranes  NECK: Supple, No JVD, Normal thyroid  NERVOUS SYSTEM:  Alert & Oriented X3,   CHEST/LUNG: Clear to auscultation bilaterally; No rales, rhonchi, wheezing, or rubs  HEART: Regular rate and rhythm; No murmurs, rubs, or gallops  ABDOMEN: Soft, Nontender, Nondistended; Bowel sounds present, lower abd wound dressing  EXTREMITIES:  Rt thigh dressing  LYMPH: No lymphadenopathy noted  SKIN: No rashes or lesions    Consultant(s) Notes Reviewed:  [x ] YES  [ ] NO  Care Discussed with Consultants/Other Providers [ x] YES  [ ] NO    LABS:                        8.6    8.92  )-----------( 331      ( 30 Apr 2018 07:50 )             28.0     04-30    137  |  98  |  9   ----------------------------<  100<H>  3.7   |  29  |  0.66    Ca    8.9      30 Apr 2018 06:06      PT/INR - ( 30 Apr 2018 07:50 )   PT: 22.9 sec;   INR: 2.00 ratio             CAPILLARY BLOOD GLUCOSE                RADIOLOGY & ADDITIONAL TESTS:    Imaging Personally Reviewed:  [ ] YES  [ ] NO

## 2018-04-30 NOTE — PROGRESS NOTE ADULT - SUBJECTIVE AND OBJECTIVE BOX
CHIEF COMPLAINT: good spirits--no significant pain or sob or cough    Interval Events: ortho, ID, plastics following    REVIEW OF SYSTEMS:  Constitutional: No fevers or chills. No weight loss. + fatigue or generalized malaise.  Eyes: No itching or discharge from the eyes  ENT: No ear pain. No ear discharge. No nasal congestion. No post nasal drip. No epistaxis. No throat pain. No sore throat. No difficulty swallowing.   CV: No chest pain. No palpitations. No lightheadedness or dizziness.   Resp: No dyspnea at rest. No dyspnea on exertion. No orthopnea. No wheezing. No cough. No stridor. No sputum production. No chest pain with respiration.  GI: No nausea. No vomiting. No diarrhea.  MSK: No joint pain or pain in any extremities--except RLE  Integumentary: No skin lesions. No pedal edema.  Neurological: No gross motor weakness. No sensory changes.  [+ All other systems negative  [ ] Unable to assess ROS because ________    OBJECTIVE:  ICU Vital Signs Last 24 Hrs  T(C): 36.8 (30 Apr 2018 04:22), Max: 36.8 (29 Apr 2018 23:36)  T(F): 98.3 (30 Apr 2018 04:22), Max: 98.3 (30 Apr 2018 04:22)  HR: 76 (30 Apr 2018 04:22) (76 - 83)  BP: 130/76 (30 Apr 2018 04:22) (119/71 - 137/56)  BP(mean): --  ABP: --  ABP(mean): --  RR: 18 (30 Apr 2018 04:22) (18 - 18)  SpO2: 94% (30 Apr 2018 04:22) (92% - 97%)        04-28 @ 07:01  -  04-29 @ 07:00  --------------------------------------------------------  IN: 290 mL / OUT: 0 mL / NET: 290 mL    04-29 @ 07:01 - 04-30 @ 05:38  --------------------------------------------------------  IN: 50 mL / OUT: 100 mL / NET: -50 mL      CAPILLARY BLOOD GLUCOSE          PHYSICAL EXAM: NAD in bed  General: Awake, alert, oriented X 3.   HEENT: Atraumatic, normocephalic.                 Mallampatti Grade 3                No nasal congestion.                No tonsillar or pharyngeal exudates.  Lymph Nodes: No palpable lymphadenopathy  Neck: No JVD. No carotid bruit.   Respiratory: Normal chest expansion                         Normal percussion                         Normal and equal air entry                         No wheeze, rhonchi or rales.  Cardiovascular: S1 S2 normal. No murmurs, rubs or gallops.   Abdomen: Soft, non-tender, non-distended. No organomegaly.  Extremities: Warm to touch. Peripheral pulse palpable. No pedal edema. Rt leg wound  Skin: No rashes or skin lesions  Neurological: Motor and sensory examination equal and normal in all four extremities.  Psychiatry: Appropriate mood and affect.    HOSPITAL MEDICATIONS:  MEDICATIONS  (STANDING):  ALPRAZolam 0.25 milliGRAM(s) Oral once  ascorbic acid 500 milliGRAM(s) Oral daily  aspirin enteric coated 81 milliGRAM(s) Oral daily  buDESOnide 160 MICROgram(s)/formoterol 4.5 MICROgram(s) Inhaler 2 Puff(s) Inhalation two times a day  calcium carbonate 1250 mG + Vitamin D (OsCal 500 + D) 1 Tablet(s) Oral three times a day  DAPTOmycin IVPB 800 milliGRAM(s) IV Intermittent every 24 hours  docusate sodium 100 milliGRAM(s) Oral three times a day  ferrous    sulfate 325 milliGRAM(s) Oral daily  folic acid 1 milliGRAM(s) Oral daily  melatonin 3 milliGRAM(s) Oral at bedtime  multivitamin 1 Tablet(s) Oral daily  nystatin    Suspension 292895 Unit(s) Oral every 6 hours  pantoprazole    Tablet 40 milliGRAM(s) Oral before breakfast  polyethylene glycol 3350 17 Gram(s) Oral daily  povidone iodine 10% Solution 1 Application(s) Topical three times a day  silver sulfADIAZINE 1% Cream 1 Application(s) Topical daily  sodium chloride 0.9%. 1000 milliLiter(s) (75 mL/Hr) IV Continuous <Continuous>  tiotropium 18 MICROgram(s) Capsule 1 Capsule(s) Inhalation daily  zinc oxide 20% Ointment 1 Application(s) Topical daily    MEDICATIONS  (PRN):  acetaminophen   Tablet 650 milliGRAM(s) Oral every 6 hours PRN For Temp greater than 38 C (100.4 F)  acetaminophen   Tablet. 650 milliGRAM(s) Oral every 6 hours PRN Mild Pain (1 - 3)  HYDROmorphone  Injectable 0.5 milliGRAM(s) IV Push every 6 hours PRN Severe Pain (7 - 10)  lidocaine 2% Viscous 10 milliLiter(s) Swish and Spit every 6 hours PRN Mouth Sores  oxyCODONE    IR 2.5 milliGRAM(s) Oral every 4 hours PRN Moderate Pain (4 - 6)  oxyCODONE    IR 2.5 milliGRAM(s) Oral once PRN Moderate Pain (4 - 6)  senna 2 Tablet(s) Oral at bedtime PRN Constipation      LABS:                        9.0    11.01 )-----------( 304      ( 28 Apr 2018 08:23 )             29.4     04-28    135  |  96  |  8   ----------------------------<  94  3.9   |  29  |  0.63    Ca    8.7      28 Apr 2018 06:59      PT/INR - ( 28 Apr 2018 08:23 )   PT: 25.1 sec;   INR: 2.19 ratio                   MICROBIOLOGY:     RADIOLOGY:  [ ] Reviewed and interpreted by me    Point of Care Ultrasound Findings:    PFT:    EKG:

## 2018-04-30 NOTE — PROGRESS NOTE ADULT - ATTENDING COMMENTS
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 2 cm. , wound , unchanged in size- re packed  C&S of wound - NG- probable  fat necrosis v liquified hematoma  Right leg wounds also redressed at patient request- no change  Chronic use of steroids for polymyalgia rheumatica discussed  All questions answered

## 2018-04-30 NOTE — PROGRESS NOTE ADULT - ASSESSMENT
no plan for orthopedic surgical intervention per family and patient preference  local wound care per plastics and wound care team  continue IV antibiotic treatment per infectious disease protocol  encourage her to spend majority of bed oob in bedside chair as able  PT to help mobilize, she must remain 50% wb on the RLE and NO KNEE MOTION allowed, knee immobilizer if oob  coumadin, inr goal 2-3  continue to optimize nutrition  continue to involve psych	    okay to dc from ortho perspective when clear plan delineated by prs/wound care teams for abdominal/thigh/knee wounds	    Nate Bass MD  Attending Orthopedic Surgeon

## 2018-04-30 NOTE — PROGRESS NOTE ADULT - ATTENDING COMMENTS
as above-  Pulm relatively stable-atelectasis, prior PE, asthma, cardiac Dz  Inhaler regimen as outlined above  DVT rx /prophylaxis  ID follow up of ABX  (IV for 42 days total then PO minocycline) PT evaluation and Psych evaluation    ortho/plastics follow up--?AKA-conservative rx---? debridement today as per pt.  Rehab pending    Everett Kumar MD-Pulmonary   [phone]

## 2018-05-01 LAB
HCT VFR BLD CALC: 28.7 % — LOW (ref 34.5–45)
HGB BLD-MCNC: 8.9 G/DL — LOW (ref 11.5–15.5)
INR BLD: 2.09 RATIO — HIGH (ref 0.88–1.16)
MCHC RBC-ENTMCNC: 27.9 PG — SIGNIFICANT CHANGE UP (ref 27–34)
MCHC RBC-ENTMCNC: 31 GM/DL — LOW (ref 32–36)
MCV RBC AUTO: 90 FL — SIGNIFICANT CHANGE UP (ref 80–100)
PLATELET # BLD AUTO: 354 K/UL — SIGNIFICANT CHANGE UP (ref 150–400)
PROTHROM AB SERPL-ACNC: 24 SEC — HIGH (ref 10–13.1)
RBC # BLD: 3.19 M/UL — LOW (ref 3.8–5.2)
RBC # FLD: 16.9 % — HIGH (ref 10.3–14.5)
WBC # BLD: 10.96 K/UL — HIGH (ref 3.8–10.5)
WBC # FLD AUTO: 10.96 K/UL — HIGH (ref 3.8–10.5)

## 2018-05-01 PROCEDURE — 99232 SBSQ HOSP IP/OBS MODERATE 35: CPT

## 2018-05-01 RX ORDER — WARFARIN SODIUM 2.5 MG/1
1.5 TABLET ORAL ONCE
Qty: 0 | Refills: 0 | Status: COMPLETED | OUTPATIENT
Start: 2018-05-01 | End: 2018-05-01

## 2018-05-01 RX ORDER — SODIUM HYPOCHLORITE 0.125 %
1 SOLUTION, NON-ORAL MISCELLANEOUS THREE TIMES A DAY
Qty: 0 | Refills: 0 | Status: DISCONTINUED | OUTPATIENT
Start: 2018-05-01 | End: 2018-05-02

## 2018-05-01 RX ADMIN — Medication 1 TABLET(S): at 13:49

## 2018-05-01 RX ADMIN — Medication 1 TABLET(S): at 11:02

## 2018-05-01 RX ADMIN — TIOTROPIUM BROMIDE 1 CAPSULE(S): 18 CAPSULE ORAL; RESPIRATORY (INHALATION) at 11:01

## 2018-05-01 RX ADMIN — Medication 500 MILLIGRAM(S): at 11:02

## 2018-05-01 RX ADMIN — Medication 100 MILLIGRAM(S): at 13:49

## 2018-05-01 RX ADMIN — Medication 1 TABLET(S): at 07:40

## 2018-05-01 RX ADMIN — Medication 1 MILLIGRAM(S): at 11:01

## 2018-05-01 RX ADMIN — Medication 100 MILLIGRAM(S): at 07:40

## 2018-05-01 RX ADMIN — HYDROMORPHONE HYDROCHLORIDE 0.5 MILLIGRAM(S): 2 INJECTION INTRAMUSCULAR; INTRAVENOUS; SUBCUTANEOUS at 06:20

## 2018-05-01 RX ADMIN — HYDROMORPHONE HYDROCHLORIDE 0.5 MILLIGRAM(S): 2 INJECTION INTRAMUSCULAR; INTRAVENOUS; SUBCUTANEOUS at 06:01

## 2018-05-01 RX ADMIN — OXYCODONE HYDROCHLORIDE 2.5 MILLIGRAM(S): 5 TABLET ORAL at 15:29

## 2018-05-01 RX ADMIN — BUDESONIDE AND FORMOTEROL FUMARATE DIHYDRATE 2 PUFF(S): 160; 4.5 AEROSOL RESPIRATORY (INHALATION) at 18:11

## 2018-05-01 RX ADMIN — PANTOPRAZOLE SODIUM 40 MILLIGRAM(S): 20 TABLET, DELAYED RELEASE ORAL at 07:44

## 2018-05-01 RX ADMIN — OXYCODONE HYDROCHLORIDE 2.5 MILLIGRAM(S): 5 TABLET ORAL at 14:59

## 2018-05-01 RX ADMIN — BUDESONIDE AND FORMOTEROL FUMARATE DIHYDRATE 2 PUFF(S): 160; 4.5 AEROSOL RESPIRATORY (INHALATION) at 06:12

## 2018-05-01 RX ADMIN — Medication 325 MILLIGRAM(S): at 11:02

## 2018-05-01 RX ADMIN — HYDROMORPHONE HYDROCHLORIDE 0.5 MILLIGRAM(S): 2 INJECTION INTRAMUSCULAR; INTRAVENOUS; SUBCUTANEOUS at 19:06

## 2018-05-01 RX ADMIN — ZINC OXIDE 1 APPLICATION(S): 200 OINTMENT TOPICAL at 11:03

## 2018-05-01 RX ADMIN — POLYETHYLENE GLYCOL 3350 17 GRAM(S): 17 POWDER, FOR SOLUTION ORAL at 11:02

## 2018-05-01 RX ADMIN — Medication 1 APPLICATION(S): at 13:49

## 2018-05-01 RX ADMIN — Medication 500000 UNIT(S): at 06:11

## 2018-05-01 RX ADMIN — Medication 500000 UNIT(S): at 11:01

## 2018-05-01 RX ADMIN — HYDROMORPHONE HYDROCHLORIDE 0.5 MILLIGRAM(S): 2 INJECTION INTRAMUSCULAR; INTRAVENOUS; SUBCUTANEOUS at 12:06

## 2018-05-01 RX ADMIN — Medication 1 APPLICATION(S): at 06:11

## 2018-05-01 RX ADMIN — Medication 500000 UNIT(S): at 15:00

## 2018-05-01 RX ADMIN — Medication 81 MILLIGRAM(S): at 11:02

## 2018-05-01 RX ADMIN — HYDROMORPHONE HYDROCHLORIDE 0.5 MILLIGRAM(S): 2 INJECTION INTRAMUSCULAR; INTRAVENOUS; SUBCUTANEOUS at 18:10

## 2018-05-01 RX ADMIN — Medication 1 APPLICATION(S): at 13:50

## 2018-05-01 RX ADMIN — HYDROMORPHONE HYDROCHLORIDE 0.5 MILLIGRAM(S): 2 INJECTION INTRAMUSCULAR; INTRAVENOUS; SUBCUTANEOUS at 13:06

## 2018-05-01 RX ADMIN — DAPTOMYCIN 132 MILLIGRAM(S): 500 INJECTION, POWDER, LYOPHILIZED, FOR SOLUTION INTRAVENOUS at 07:24

## 2018-05-01 NOTE — PROGRESS NOTE ADULT - ASSESSMENT
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    # Fever, resolved  # Recent septic Rt TKR - abx spacer exchanged and plastics complex wound closure  # Aspiration PNA, resolved  # Supratherapeutic INR/coagulopathy, resolved  # PMR on chronic steroids  # thigh hematoma, leg wound  #lower abd wound    Plan:  remains afebrile, cont Dapto per ID until 5/5, BC NTD, appreciate ID recs  h/h stable  appreciate plastics surgery recs, local wound care, to stabilize wound before considering rehab  wound care c/s appreciated  will cont coumadin and asa  pain control, titrate to PO  PT OOB   bowel regimen    plan of care dw patient, NP

## 2018-05-01 NOTE — PROGRESS NOTE ADULT - SUBJECTIVE AND OBJECTIVE BOX
pain controlled, awake and alert  afebrile    RLE  thigh with fat necrosis, stable, further demarkating, no surrounding erythema, dressed by prs  surgical wound intact with stable eschar, some separation of eschar medially from skin, no further exudate, dressed by prs  5/5 ta/ehl/gcs  silt l4-s1  2+ dp pulse pain controlled, awake and alert  afebrile  emotional today    RLE  thigh with skin and fat necrosis, stable, further demarkating, no surrounding erythema, dressed by prs/wound care  surgical wound intact with stable eschar, some separation of eschar medially from skin, no exudate, dressed by prs/wound care  5/5 ta/ehl/gcs  silt l4-s1  2+ dp pulse

## 2018-05-01 NOTE — PROGRESS NOTE ADULT - PROBLEM SELECTOR PLAN 1
multifactorial--asthma, obesity, CAD, prior PE, likely aspiration PNA--clearing--clinicly better multifactorial--asthma, obesity, CAD, prior PE, likely aspiration PNA--clearing--clinically better

## 2018-05-01 NOTE — ADVANCED PRACTICE NURSE CONSULT - REASON FOR CONSULT
Requested by nursing staff to assess skin on bilateral hips.  Patient is known to the wound care team from prior assessments.  Patient was last seen by wound care team on Friday April 27, 2018 and documentation from that visit was reviewed.  At this time nursing staff concerned that bilateral hips are presenting with linear striation of hypergranulation on the right hip and a irregular shaped area of hypergranulation on the left hip.   Patient is located on [address] [facility] and is on contact precautions.

## 2018-05-01 NOTE — PROGRESS NOTE ADULT - ATTENDING COMMENTS
as above-  Pulm relatively stable-atelectasis, prior PE, asthma, cardiac Dz  Inhaler regimen as outlined above  DVT rx /prophylaxis  ID follow up of ABX  (IV for 42 days total then PO minocycline) PT evaluation and Psych evaluation    ortho/plastics follow up--?AKA-conservative rx---? debridement today as per pt.  Rehab pending    Everett Kumar MD-Pulmonary   [phone] as above-  Pulm relatively stable-atelectasis, prior PE, asthma, cardiac Dz  Inhaler regimen as outlined above  DVT rx /prophylaxis  ID follow up of ABX  (IV for 42 days total then PO minocycline) PT evaluation and Psych evaluation    ortho/plastics follow up--?AKA-conservative rx  Rehab pending    Everett Kumar MD-Pulmonary   [phone]

## 2018-05-01 NOTE — PROGRESS NOTE ADULT - ATTENDING COMMENTS
plan to dc to rehab on hold-pending plastics wound care plan    Reina Murphy MD  [facility]    Dr Marrero will be covering me starting   5/2 /18  Please page

## 2018-05-01 NOTE — PROGRESS NOTE ADULT - SUBJECTIVE AND OBJECTIVE BOX
Patient is a 77y old  Female who presents with a chief complaint of fever (11 Apr 2018 14:25)      INTERVAL HPI/OVERNIGHT EVENTS: noted, feels well        Vital Signs Last 24 Hrs  T(C): 36.5 (01 May 2018 10:00), Max: 37.6 (30 Apr 2018 23:30)  T(F): 97.7 (01 May 2018 10:00), Max: 99.6 (30 Apr 2018 23:30)  HR: 81 (01 May 2018 10:00) (81 - 99)  BP: 130/68 (01 May 2018 10:00) (117/71 - 131/71)  BP(mean): --  RR: 18 (01 May 2018 10:00) (18 - 18)  SpO2: 95% (01 May 2018 10:00) (93% - 95%)    acetaminophen   Tablet 650 milliGRAM(s) Oral every 6 hours PRN  acetaminophen   Tablet. 650 milliGRAM(s) Oral every 6 hours PRN  ALPRAZolam 0.25 milliGRAM(s) Oral once  ascorbic acid 500 milliGRAM(s) Oral daily  aspirin enteric coated 81 milliGRAM(s) Oral daily  buDESOnide 160 MICROgram(s)/formoterol 4.5 MICROgram(s) Inhaler 2 Puff(s) Inhalation two times a day  calcium carbonate 1250 mG + Vitamin D (OsCal 500 + D) 1 Tablet(s) Oral three times a day  Dakins Solution - 1/4 Strength 1 Application(s) Topical three times a day  DAPTOmycin IVPB 800 milliGRAM(s) IV Intermittent every 24 hours  docusate sodium 100 milliGRAM(s) Oral three times a day  ferrous    sulfate 325 milliGRAM(s) Oral daily  folic acid 1 milliGRAM(s) Oral daily  HYDROmorphone  Injectable 0.5 milliGRAM(s) IV Push every 6 hours PRN  lidocaine 2% Viscous 10 milliLiter(s) Swish and Spit every 6 hours PRN  melatonin 3 milliGRAM(s) Oral at bedtime  multivitamin 1 Tablet(s) Oral daily  nystatin    Suspension 244583 Unit(s) Oral every 6 hours  oxyCODONE    IR 2.5 milliGRAM(s) Oral every 4 hours PRN  oxyCODONE    IR 2.5 milliGRAM(s) Oral once PRN  pantoprazole    Tablet 40 milliGRAM(s) Oral before breakfast  polyethylene glycol 3350 17 Gram(s) Oral daily  povidone iodine 10% Solution 1 Application(s) Topical three times a day  senna 2 Tablet(s) Oral at bedtime PRN  sodium chloride 0.9%. 1000 milliLiter(s) IV Continuous <Continuous>  tiotropium 18 MICROgram(s) Capsule 1 Capsule(s) Inhalation daily  zinc oxide 20% Ointment 1 Application(s) Topical daily      PHYSICAL EXAM:  GENERAL: NAD,   EYES: conjunctiva and sclera clear  ENMT: Moist mucous membranes  NECK: Supple, No JVD, Normal thyroid  NERVOUS SYSTEM:  Alert & Oriented X3,   CHEST/LUNG: Clear to auscultation bilaterally; No rales, rhonchi, wheezing, or rubs  HEART: Regular rate and rhythm; No murmurs, rubs, or gallops  ABDOMEN: Soft, Nontender, Nondistended; Bowel sounds present  EXTREMITIES:  Rt LE knee wound, prox thigh wound, lower abd wound dressing  LYMPH: No lymphadenopathy noted  SKIN: No rashes or lesions    Consultant(s) Notes Reviewed:  [x ] YES  [ ] NO  Care Discussed with Consultants/Other Providers [ x] YES  [ ] NO    LABS:                        8.9    10.96 )-----------( 354      ( 01 May 2018 07:53 )             28.7     04-30    137  |  98  |  9   ----------------------------<  100<H>  3.7   |  29  |  0.66    Ca    8.9      30 Apr 2018 06:06      PT/INR - ( 01 May 2018 07:51 )   PT: 24.0 sec;   INR: 2.09 ratio             CAPILLARY BLOOD GLUCOSE                RADIOLOGY & ADDITIONAL TESTS:    Imaging Personally Reviewed:  [ ] YES  [ ] NO

## 2018-05-01 NOTE — PROVIDER CONTACT NOTE (MEDICATION) - SITUATION
pt refusing colace, Protonix and calcium pill at this time. requesting to take them later. she "wants to enjoy this pain free time"

## 2018-05-01 NOTE — PROGRESS NOTE ADULT - SUBJECTIVE AND OBJECTIVE BOX
CHIEF COMPLAINT:    Interval Events:    REVIEW OF SYSTEMS:  Constitutional: No fevers or chills. No weight loss. No fatigue or generalized malaise.  Eyes: No itching or discharge from the eyes  ENT: No ear pain. No ear discharge. No nasal congestion. No post nasal drip. No epistaxis. No throat pain. No sore throat. No difficulty swallowing.   CV: No chest pain. No palpitations. No lightheadedness or dizziness.   Resp: No dyspnea at rest. No dyspnea on exertion. No orthopnea. No wheezing. No cough. No stridor. No sputum production. No chest pain with respiration.  GI: No nausea. No vomiting. No diarrhea.  MSK: No joint pain or pain in any extremities  Integumentary: No skin lesions. No pedal edema.  Neurological: No gross motor weakness. No sensory changes.  [ ] All other systems negative  [ ] Unable to assess ROS because ________    OBJECTIVE:  ICU Vital Signs Last 24 Hrs  T(C): 37.6 (30 Apr 2018 23:30), Max: 37.6 (30 Apr 2018 23:30)  T(F): 99.6 (30 Apr 2018 23:30), Max: 99.6 (30 Apr 2018 23:30)  HR: 88 (30 Apr 2018 23:30) (84 - 88)  BP: 131/71 (30 Apr 2018 23:30) (117/71 - 131/71)  BP(mean): --  ABP: --  ABP(mean): --  RR: 18 (30 Apr 2018 23:30) (18 - 18)  SpO2: 93% (30 Apr 2018 23:30) (93% - 93%)        04-29 @ 07:01 - 04-30 @ 07:00  --------------------------------------------------------  IN: 50 mL / OUT: 100 mL / NET: -50 mL    04-30 @ 07:01  -  05-01 @ 05:18  --------------------------------------------------------  IN: 480 mL / OUT: 0 mL / NET: 480 mL      CAPILLARY BLOOD GLUCOSE          PHYSICAL EXAM:  General: Awake, alert, oriented X 3.   HEENT: Atraumatic, normocephalic.                 Mallampatti Grade                 No nasal congestion.                No tonsillar or pharyngeal exudates.  Lymph Nodes: No palpable lymphadenopathy  Neck: No JVD. No carotid bruit.   Respiratory: Normal chest expansion                         Normal percussion                         Normal and equal air entry                         No wheeze, rhonchi or rales.  Cardiovascular: S1 S2 normal. No murmurs, rubs or gallops.   Abdomen: Soft, non-tender, non-distended. No organomegaly.  Extremities: Warm to touch. Peripheral pulse palpable. No pedal edema.   Skin: No rashes or skin lesions  Neurological: Motor and sensory examination equal and normal in all four extremities.  Psychiatry: Appropriate mood and affect.    HOSPITAL MEDICATIONS:  MEDICATIONS  (STANDING):  ALPRAZolam 0.25 milliGRAM(s) Oral once  ascorbic acid 500 milliGRAM(s) Oral daily  aspirin enteric coated 81 milliGRAM(s) Oral daily  buDESOnide 160 MICROgram(s)/formoterol 4.5 MICROgram(s) Inhaler 2 Puff(s) Inhalation two times a day  calcium carbonate 1250 mG + Vitamin D (OsCal 500 + D) 1 Tablet(s) Oral three times a day  DAPTOmycin IVPB 800 milliGRAM(s) IV Intermittent every 24 hours  docusate sodium 100 milliGRAM(s) Oral three times a day  ferrous    sulfate 325 milliGRAM(s) Oral daily  folic acid 1 milliGRAM(s) Oral daily  melatonin 3 milliGRAM(s) Oral at bedtime  multivitamin 1 Tablet(s) Oral daily  nystatin    Suspension 441154 Unit(s) Oral every 6 hours  pantoprazole    Tablet 40 milliGRAM(s) Oral before breakfast  polyethylene glycol 3350 17 Gram(s) Oral daily  povidone iodine 10% Solution 1 Application(s) Topical three times a day  sodium chloride 0.9%. 1000 milliLiter(s) (75 mL/Hr) IV Continuous <Continuous>  tiotropium 18 MICROgram(s) Capsule 1 Capsule(s) Inhalation daily  zinc oxide 20% Ointment 1 Application(s) Topical daily    MEDICATIONS  (PRN):  acetaminophen   Tablet 650 milliGRAM(s) Oral every 6 hours PRN For Temp greater than 38 C (100.4 F)  acetaminophen   Tablet. 650 milliGRAM(s) Oral every 6 hours PRN Mild Pain (1 - 3)  HYDROmorphone  Injectable 0.5 milliGRAM(s) IV Push every 6 hours PRN Severe Pain (7 - 10)  lidocaine 2% Viscous 10 milliLiter(s) Swish and Spit every 6 hours PRN Mouth Sores  oxyCODONE    IR 2.5 milliGRAM(s) Oral every 4 hours PRN Moderate Pain (4 - 6)  oxyCODONE    IR 2.5 milliGRAM(s) Oral once PRN Moderate Pain (4 - 6)  senna 2 Tablet(s) Oral at bedtime PRN Constipation      LABS:                        8.6    8.92  )-----------( 331      ( 30 Apr 2018 07:50 )             28.0     04-30    137  |  98  |  9   ----------------------------<  100<H>  3.7   |  29  |  0.66    Ca    8.9      30 Apr 2018 06:06      PT/INR - ( 30 Apr 2018 07:50 )   PT: 22.9 sec;   INR: 2.00 ratio                   MICROBIOLOGY:     RADIOLOGY:  [ ] Reviewed and interpreted by me    Point of Care Ultrasound Findings:    PFT:    EKG: CHIEF COMPLAINT: depressed--leg pain but less than weeks ago; no sob    Interval Events: OOB minimally    REVIEW OF SYSTEMS:  Constitutional: No fevers or chills. No weight loss. + fatigue or generalized malaise.  Eyes: No itching or discharge from the eyes  ENT: No ear pain. No ear discharge. No nasal congestion. No post nasal drip. No epistaxis. No throat pain. No sore throat. No difficulty swallowing.   CV: No chest pain. No palpitations. No lightheadedness or dizziness.   Resp: No dyspnea at rest. No dyspnea on exertion. No orthopnea. No wheezing. No cough. No stridor. No sputum production. No chest pain with respiration.  GI: No nausea. No vomiting. No diarrhea.  MSK: No joint pain or pain in any extremities--except RLE  Integumentary: No skin lesions. No pedal edema.  Neurological: No gross motor weakness. No sensory changes.  [+ ] All other systems negative  [ ] Unable to assess ROS because ________    OBJECTIVE:  ICU Vital Signs Last 24 Hrs  T(C): 37.6 (30 Apr 2018 23:30), Max: 37.6 (30 Apr 2018 23:30)  T(F): 99.6 (30 Apr 2018 23:30), Max: 99.6 (30 Apr 2018 23:30)  HR: 88 (30 Apr 2018 23:30) (84 - 88)  BP: 131/71 (30 Apr 2018 23:30) (117/71 - 131/71)  BP(mean): --  ABP: --  ABP(mean): --  RR: 18 (30 Apr 2018 23:30) (18 - 18)  SpO2: 93% (30 Apr 2018 23:30) (93% - 93%)        04-29 @ 07:01  -  04-30 @ 07:00  --------------------------------------------------------  IN: 50 mL / OUT: 100 mL / NET: -50 mL    04-30 @ 07:01  - 05-01 @ 05:18  --------------------------------------------------------  IN: 480 mL / OUT: 0 mL / NET: 480 mL      CAPILLARY BLOOD GLUCOSE          PHYSICAL EXAM: NAD in bed  General: Awake, alert, oriented X 3.   HEENT: Atraumatic, normocephalic.                 Mallampatti Grade 3                No nasal congestion.                No tonsillar or pharyngeal exudates.  Lymph Nodes: No palpable lymphadenopathy  Neck: No JVD. No carotid bruit.   Respiratory: Normal chest expansion--reduced BS bases                         Normal percussion                         Normal and equal air entry                         No wheeze, rhonchi or rales.  Cardiovascular: S1 S2 normal. No murmurs, rubs or gallops.   Abdomen: Soft, non-tender, non-distended. No organomegaly.  Extremities: Warm to touch. Peripheral pulse palpable. No pedal edema.   Skin: No rashes or skin lesions  Neurological: Motor and sensory examination equal and normal in all four extremities except RLE  Psychiatry: Appropriate mood and affect.    HOSPITAL MEDICATIONS:  MEDICATIONS  (STANDING):  ALPRAZolam 0.25 milliGRAM(s) Oral once  ascorbic acid 500 milliGRAM(s) Oral daily  aspirin enteric coated 81 milliGRAM(s) Oral daily  buDESOnide 160 MICROgram(s)/formoterol 4.5 MICROgram(s) Inhaler 2 Puff(s) Inhalation two times a day  calcium carbonate 1250 mG + Vitamin D (OsCal 500 + D) 1 Tablet(s) Oral three times a day  DAPTOmycin IVPB 800 milliGRAM(s) IV Intermittent every 24 hours  docusate sodium 100 milliGRAM(s) Oral three times a day  ferrous    sulfate 325 milliGRAM(s) Oral daily  folic acid 1 milliGRAM(s) Oral daily  melatonin 3 milliGRAM(s) Oral at bedtime  multivitamin 1 Tablet(s) Oral daily  nystatin    Suspension 056063 Unit(s) Oral every 6 hours  pantoprazole    Tablet 40 milliGRAM(s) Oral before breakfast  polyethylene glycol 3350 17 Gram(s) Oral daily  povidone iodine 10% Solution 1 Application(s) Topical three times a day  sodium chloride 0.9%. 1000 milliLiter(s) (75 mL/Hr) IV Continuous <Continuous>  tiotropium 18 MICROgram(s) Capsule 1 Capsule(s) Inhalation daily  zinc oxide 20% Ointment 1 Application(s) Topical daily    MEDICATIONS  (PRN):  acetaminophen   Tablet 650 milliGRAM(s) Oral every 6 hours PRN For Temp greater than 38 C (100.4 F)  acetaminophen   Tablet. 650 milliGRAM(s) Oral every 6 hours PRN Mild Pain (1 - 3)  HYDROmorphone  Injectable 0.5 milliGRAM(s) IV Push every 6 hours PRN Severe Pain (7 - 10)  lidocaine 2% Viscous 10 milliLiter(s) Swish and Spit every 6 hours PRN Mouth Sores  oxyCODONE    IR 2.5 milliGRAM(s) Oral every 4 hours PRN Moderate Pain (4 - 6)  oxyCODONE    IR 2.5 milliGRAM(s) Oral once PRN Moderate Pain (4 - 6)  senna 2 Tablet(s) Oral at bedtime PRN Constipation      LABS:                        8.6    8.92  )-----------( 331      ( 30 Apr 2018 07:50 )             28.0     04-30    137  |  98  |  9   ----------------------------<  100<H>  3.7   |  29  |  0.66    Ca    8.9      30 Apr 2018 06:06      PT/INR - ( 30 Apr 2018 07:50 )   PT: 22.9 sec;   INR: 2.00 ratio                   MICROBIOLOGY:     RADIOLOGY:  [ ] Reviewed and interpreted by me    Point of Care Ultrasound Findings:    PFT:    EKG:

## 2018-05-01 NOTE — ADVANCED PRACTICE NURSE CONSULT - ASSESSMENT
Patient is on a [employer] P 500 low airloss surface and nurse reporting challenges turning and positioning the patient.  Patient has a BMI of 47.2 and has been refusing attempts by the nurses to fully turn and position her on her side.  In addition,  due to the presence of wounds in her groin, she has also been refusing to be transferred from the bed to the chair utilizing a Prudencio lift.  Physical Therapy has been in working with her but again pain interferes with therapy.  Patient crying that she wishes to die and is sick of the pain.  Nurse reports patient has been refusing an amputation of this RLE as she does not want to loose her leg.  As per nurse a recommendation has been agreed to by team for psychological support.   On assessment patient presents with linear striations on her rigth hip region.  The linear striations are firm to the touch, are hyperpigmented in color and at this time are intact without drainage.  Unable to identify the etiology of the wounds and RN will bring to the team's attention during rounds.   On the left upper hip region there is an isolated area irregular shaped area of hyperpigmentation.  Tissue  is intact with no drainage and no pain tot he patient.  Periwound skin is intact.  Again unable to identify etiology.    Staff to continue to monitor sites and patient will not consider allowing for turning and positioning as needed due to her pain that cannot be relieved.    RN in room during assessment and aware of findings.  Will request an Envella bed.  Patient has been educated on interventions to protect her skin but unable to allow measures that involve increasing her activity and mobility at this time.

## 2018-05-01 NOTE — PROGRESS NOTE ADULT - ASSESSMENT
no plan for orthopedic surgical intervention per family and patient preference  local wound care per plastics and wound care team  continue IV antibiotic treatment per infectious disease protocol  encourage her to spend majority of bed oob in bedside chair as able  PT to help mobilize, she must remain 50% wb on the RLE and NO KNEE MOTION allowed, knee immobilizer if oob  coumadin, inr goal 2-3  continue to optimize nutrition  continue to involve psych	    okay to dc from ortho perspective when clear plan delineated by prs/wound care teams for abdominal/thigh/knee wounds	    Nate Bass MD  Attending Orthopedic Surgeon no plan for orthopedic surgical intervention per family and patient preference  local wound care per plastics and wound care team  continue IV antibiotic treatment per infectious disease protocol  encourage patient to spend majority of bed oob in bedside chair as able  PT to help mobilize, she must remain 50% wb on the RLE and NO KNEE MOTION allowed, knee immobilizer if oob  coumadin, inr goal 2-3  continue to optimize nutrition  continue to involve psych, patient is more emotional today	    needs clear plan delineated by prs/wound care teams for abdominal/thigh/knee wounds    I spoke with the patient and her daughter and grandson today for >30 minutes and then her  for 20 minutes separately as well today. We discussed PRS/wound care plans for treating the various wounds. They were also concerned about pain control during dressing changes. Per their report, the dressing changes are very painful and they would appreciate better pain medication protocols during dressing changes. We then met with the patient's nurse who reported that IV medications will be administered prior to dressing changes. We also discussed Ms. Rdz's need to help herself move around in bed and attempt to ambulate. PT notes indicate that she is stopping her movement to stand due to either pain or lack of patient cooperation. She has a trapese set up and available to her which she has not used since it was set up >2 weeks ago. Also per the notes she is refusing frequent turns to avoid decubitus ulcers from forming. Wound care has worked to arrange a bariatric bed for her which is pending set up. Everyone was satisfied at the end of todays visit and has a clear understanding of the various medical team plans moving forward. SNF transfer is on hold at the moment until further progress is made with wound care per PRS/wound care.    Nate Bass MD  Attending Orthopedic Surgeon

## 2018-05-02 LAB
HCT VFR BLD CALC: 26.3 % — LOW (ref 34.5–45)
HGB BLD-MCNC: 8.2 G/DL — LOW (ref 11.5–15.5)
INR BLD: 2.13 RATIO — HIGH (ref 0.88–1.16)
MCHC RBC-ENTMCNC: 27.6 PG — SIGNIFICANT CHANGE UP (ref 27–34)
MCHC RBC-ENTMCNC: 31.2 GM/DL — LOW (ref 32–36)
MCV RBC AUTO: 88.6 FL — SIGNIFICANT CHANGE UP (ref 80–100)
PLATELET # BLD AUTO: 318 K/UL — SIGNIFICANT CHANGE UP (ref 150–400)
PROTHROM AB SERPL-ACNC: 24.5 SEC — HIGH (ref 10–13.1)
RBC # BLD: 2.97 M/UL — LOW (ref 3.8–5.2)
RBC # FLD: 17.1 % — HIGH (ref 10.3–14.5)
WBC # BLD: 9.17 K/UL — SIGNIFICANT CHANGE UP (ref 3.8–10.5)
WBC # FLD AUTO: 9.17 K/UL — SIGNIFICANT CHANGE UP (ref 3.8–10.5)

## 2018-05-02 PROCEDURE — 99232 SBSQ HOSP IP/OBS MODERATE 35: CPT

## 2018-05-02 RX ORDER — WARFARIN SODIUM 2.5 MG/1
2 TABLET ORAL ONCE
Qty: 0 | Refills: 0 | Status: COMPLETED | OUTPATIENT
Start: 2018-05-02 | End: 2018-05-02

## 2018-05-02 RX ORDER — OXYCODONE HYDROCHLORIDE 5 MG/1
2.5 TABLET ORAL EVERY 4 HOURS
Qty: 0 | Refills: 0 | Status: DISCONTINUED | OUTPATIENT
Start: 2018-05-02 | End: 2018-05-03

## 2018-05-02 RX ADMIN — Medication 1 TABLET(S): at 06:42

## 2018-05-02 RX ADMIN — HYDROMORPHONE HYDROCHLORIDE 0.5 MILLIGRAM(S): 2 INJECTION INTRAMUSCULAR; INTRAVENOUS; SUBCUTANEOUS at 12:30

## 2018-05-02 RX ADMIN — Medication 100 MILLIGRAM(S): at 06:42

## 2018-05-02 RX ADMIN — POLYETHYLENE GLYCOL 3350 17 GRAM(S): 17 POWDER, FOR SOLUTION ORAL at 11:03

## 2018-05-02 RX ADMIN — HYDROMORPHONE HYDROCHLORIDE 0.5 MILLIGRAM(S): 2 INJECTION INTRAMUSCULAR; INTRAVENOUS; SUBCUTANEOUS at 00:10

## 2018-05-02 RX ADMIN — WARFARIN SODIUM 1.5 MILLIGRAM(S): 2.5 TABLET ORAL at 00:12

## 2018-05-02 RX ADMIN — OXYCODONE HYDROCHLORIDE 2.5 MILLIGRAM(S): 5 TABLET ORAL at 22:20

## 2018-05-02 RX ADMIN — TIOTROPIUM BROMIDE 1 CAPSULE(S): 18 CAPSULE ORAL; RESPIRATORY (INHALATION) at 11:01

## 2018-05-02 RX ADMIN — WARFARIN SODIUM 2 MILLIGRAM(S): 2.5 TABLET ORAL at 21:52

## 2018-05-02 RX ADMIN — Medication 1 APPLICATION(S): at 00:12

## 2018-05-02 RX ADMIN — Medication 325 MILLIGRAM(S): at 11:01

## 2018-05-02 RX ADMIN — OXYCODONE HYDROCHLORIDE 2.5 MILLIGRAM(S): 5 TABLET ORAL at 22:50

## 2018-05-02 RX ADMIN — Medication 1 TABLET(S): at 21:52

## 2018-05-02 RX ADMIN — ZINC OXIDE 1 APPLICATION(S): 200 OINTMENT TOPICAL at 11:03

## 2018-05-02 RX ADMIN — Medication 500000 UNIT(S): at 09:42

## 2018-05-02 RX ADMIN — Medication 1 APPLICATION(S): at 00:13

## 2018-05-02 RX ADMIN — HYDROMORPHONE HYDROCHLORIDE 0.5 MILLIGRAM(S): 2 INJECTION INTRAMUSCULAR; INTRAVENOUS; SUBCUTANEOUS at 18:55

## 2018-05-02 RX ADMIN — DAPTOMYCIN 132 MILLIGRAM(S): 500 INJECTION, POWDER, LYOPHILIZED, FOR SOLUTION INTRAVENOUS at 08:13

## 2018-05-02 RX ADMIN — OXYCODONE HYDROCHLORIDE 2.5 MILLIGRAM(S): 5 TABLET ORAL at 09:42

## 2018-05-02 RX ADMIN — HYDROMORPHONE HYDROCHLORIDE 0.5 MILLIGRAM(S): 2 INJECTION INTRAMUSCULAR; INTRAVENOUS; SUBCUTANEOUS at 13:00

## 2018-05-02 RX ADMIN — HYDROMORPHONE HYDROCHLORIDE 0.5 MILLIGRAM(S): 2 INJECTION INTRAMUSCULAR; INTRAVENOUS; SUBCUTANEOUS at 18:25

## 2018-05-02 RX ADMIN — Medication 100 MILLIGRAM(S): at 21:52

## 2018-05-02 RX ADMIN — Medication 1 TABLET(S): at 13:32

## 2018-05-02 RX ADMIN — OXYCODONE HYDROCHLORIDE 2.5 MILLIGRAM(S): 5 TABLET ORAL at 10:42

## 2018-05-02 RX ADMIN — Medication 500 MILLIGRAM(S): at 11:01

## 2018-05-02 RX ADMIN — PANTOPRAZOLE SODIUM 40 MILLIGRAM(S): 20 TABLET, DELAYED RELEASE ORAL at 06:42

## 2018-05-02 RX ADMIN — BUDESONIDE AND FORMOTEROL FUMARATE DIHYDRATE 2 PUFF(S): 160; 4.5 AEROSOL RESPIRATORY (INHALATION) at 06:42

## 2018-05-02 RX ADMIN — Medication 81 MILLIGRAM(S): at 11:01

## 2018-05-02 RX ADMIN — BUDESONIDE AND FORMOTEROL FUMARATE DIHYDRATE 2 PUFF(S): 160; 4.5 AEROSOL RESPIRATORY (INHALATION) at 17:26

## 2018-05-02 RX ADMIN — Medication 500000 UNIT(S): at 17:27

## 2018-05-02 RX ADMIN — HYDROMORPHONE HYDROCHLORIDE 0.5 MILLIGRAM(S): 2 INJECTION INTRAMUSCULAR; INTRAVENOUS; SUBCUTANEOUS at 06:35

## 2018-05-02 RX ADMIN — Medication 1 MILLIGRAM(S): at 11:02

## 2018-05-02 RX ADMIN — HYDROMORPHONE HYDROCHLORIDE 0.5 MILLIGRAM(S): 2 INJECTION INTRAMUSCULAR; INTRAVENOUS; SUBCUTANEOUS at 00:25

## 2018-05-02 RX ADMIN — Medication 100 MILLIGRAM(S): at 13:32

## 2018-05-02 RX ADMIN — Medication 3 MILLIGRAM(S): at 21:52

## 2018-05-02 RX ADMIN — Medication 500000 UNIT(S): at 21:52

## 2018-05-02 RX ADMIN — HYDROMORPHONE HYDROCHLORIDE 0.5 MILLIGRAM(S): 2 INJECTION INTRAMUSCULAR; INTRAVENOUS; SUBCUTANEOUS at 06:16

## 2018-05-02 RX ADMIN — Medication 1 TABLET(S): at 11:01

## 2018-05-02 RX ADMIN — Medication 500000 UNIT(S): at 06:42

## 2018-05-02 RX ADMIN — Medication 1 APPLICATION(S): at 06:53

## 2018-05-02 RX ADMIN — Medication 3 MILLIGRAM(S): at 00:12

## 2018-05-02 NOTE — PROGRESS NOTE ADULT - SUBJECTIVE AND OBJECTIVE BOX
CHIEF COMPLAINT:good sleep-no signif pain or sob    Interval Events: OOB--ortho/plastics/ID    REVIEW OF SYSTEMS:  Constitutional: No fevers or chills. No weight loss. + fatigue or generalized malaise.  Eyes: No itching or discharge from the eyes  ENT: No ear pain. No ear discharge. No nasal congestion. No post nasal drip. No epistaxis. No throat pain. No sore throat. No difficulty swallowing.   CV: No chest pain. No palpitations. No lightheadedness or dizziness.   Resp: No dyspnea at rest. No dyspnea on exertion. No orthopnea. No wheezing. No cough. No stridor. No sputum production. No chest pain with respiration.  GI: No nausea. No vomiting. No diarrhea.  MSK: No joint pain or pain in any extremities-except RLE  Integumentary: No skin lesions. No pedal edema.  Neurological: No gross motor weakness. No sensory changes.  [+ ] All other systems negative  [ ] Unable to assess ROS because ________    OBJECTIVE:  ICU Vital Signs Last 24 Hrs  T(C): 36.7 (01 May 2018 23:55), Max: 36.8 (01 May 2018 22:00)  T(F): 98 (01 May 2018 23:55), Max: 98.3 (01 May 2018 22:00)  HR: 79 (01 May 2018 23:55) (74 - 99)  BP: 143/76 (01 May 2018 23:55) (122/78 - 145/71)  BP(mean): --  ABP: --  ABP(mean): --  RR: 18 (01 May 2018 23:55) (18 - 18)  SpO2: 94% (01 May 2018 23:55) (93% - 95%)        04-30 @ 07:01  -  05-01 @ 07:00  --------------------------------------------------------  IN: 600 mL / OUT: 0 mL / NET: 600 mL    05-01 @ 07:01  - 05-02 @ 05:29  --------------------------------------------------------  IN: 580 mL / OUT: 0 mL / NET: 580 mL      CAPILLARY BLOOD GLUCOSE          PHYSICAL EXAM: NAD in bed--NC O2  General: Awake, alert, oriented X 3.   HEENT: Atraumatic, normocephalic.                 Mallampatti Grade                 No nasal congestion.                No tonsillar or pharyngeal exudates.  Lymph Nodes: No palpable lymphadenopathy  Neck: No JVD. No carotid bruit.   Respiratory: abnormal chest expansion-reduced BS bases                         Normal percussion                         Normal and equal air entry                         No wheeze, rhonchi or rales.  Cardiovascular: S1 S2 normal. No murmurs, rubs or gallops.   Abdomen: Soft, non-tender, non-distended. No organomegaly.  Extremities: Warm to touch. Peripheral pulse palpable. No pedal edema.   Skin: No rashes or skin lesions RLE leg wound  Neurological: Motor and sensory examination equal and normal in all four extremities.  Psychiatry: Appropriate mood and affect.    HOSPITAL MEDICATIONS:  MEDICATIONS  (STANDING):  ALPRAZolam 0.25 milliGRAM(s) Oral once  ascorbic acid 500 milliGRAM(s) Oral daily  aspirin enteric coated 81 milliGRAM(s) Oral daily  buDESOnide 160 MICROgram(s)/formoterol 4.5 MICROgram(s) Inhaler 2 Puff(s) Inhalation two times a day  calcium carbonate 1250 mG + Vitamin D (OsCal 500 + D) 1 Tablet(s) Oral three times a day  Dakins Solution - 1/4 Strength 1 Application(s) Topical three times a day  DAPTOmycin IVPB 800 milliGRAM(s) IV Intermittent every 24 hours  docusate sodium 100 milliGRAM(s) Oral three times a day  ferrous    sulfate 325 milliGRAM(s) Oral daily  folic acid 1 milliGRAM(s) Oral daily  melatonin 3 milliGRAM(s) Oral at bedtime  multivitamin 1 Tablet(s) Oral daily  nystatin    Suspension 689500 Unit(s) Oral every 6 hours  pantoprazole    Tablet 40 milliGRAM(s) Oral before breakfast  polyethylene glycol 3350 17 Gram(s) Oral daily  povidone iodine 10% Solution 1 Application(s) Topical three times a day  sodium chloride 0.9%. 1000 milliLiter(s) (75 mL/Hr) IV Continuous <Continuous>  tiotropium 18 MICROgram(s) Capsule 1 Capsule(s) Inhalation daily  zinc oxide 20% Ointment 1 Application(s) Topical daily    MEDICATIONS  (PRN):  acetaminophen   Tablet 650 milliGRAM(s) Oral every 6 hours PRN For Temp greater than 38 C (100.4 F)  acetaminophen   Tablet. 650 milliGRAM(s) Oral every 6 hours PRN Mild Pain (1 - 3)  HYDROmorphone  Injectable 0.5 milliGRAM(s) IV Push every 6 hours PRN Severe Pain (7 - 10)  lidocaine 2% Viscous 10 milliLiter(s) Swish and Spit every 6 hours PRN Mouth Sores  oxyCODONE    IR 2.5 milliGRAM(s) Oral every 4 hours PRN Moderate Pain (4 - 6)  oxyCODONE    IR 2.5 milliGRAM(s) Oral once PRN Moderate Pain (4 - 6)  senna 2 Tablet(s) Oral at bedtime PRN Constipation      LABS:                        8.9    10.96 )-----------( 354      ( 01 May 2018 07:53 )             28.7     04-30    137  |  98  |  9   ----------------------------<  100<H>  3.7   |  29  |  0.66    Ca    8.9      30 Apr 2018 06:06      PT/INR - ( 01 May 2018 07:51 )   PT: 24.0 sec;   INR: 2.09 ratio                   MICROBIOLOGY:     RADIOLOGY:  [ ] Reviewed and interpreted by me    Point of Care Ultrasound Findings:    PFT:    EKG:

## 2018-05-02 NOTE — PROGRESS NOTE ADULT - ATTENDING COMMENTS
as above-  Pulm relatively stable-atelectasis, prior PE, asthma, cardiac Dz  Inhaler regimen as outlined above  DVT rx /prophylaxis  ID follow up of ABX  (IV for 42 days total then PO minocycline)-to complet 5/4   PT evaluation and Psych evaluation    ortho/plastics follow up--?AKA-if conservative rx-not successful  Rehab pending    Everett Kumar MD-Pulmonary   [phone]

## 2018-05-02 NOTE — PROGRESS NOTE ADULT - ASSESSMENT
Plan:  -continue local wound care:   WTD with saline to knee eschar, TID; WTD with dakins to proximal thigh and pannus wound, TID  -care per primary

## 2018-05-02 NOTE — PROGRESS NOTE ADULT - ASSESSMENT
Patient with infected right tkr s/p rudy, spacer for strept infection, had poor wound healing so returned 3 months after and had spacer exchange and complex wound closure with mrsa infection found. She is about 2 weeks into dapto and returns with fever, leukocytosis, ongoing pain and eschar of right knee wound and what appears to be a hematoma of the right thigh. INR has been up so that is good reason for the thigh findings. Must consider infected hematoma possible. UTI is possible. Pneumonia is possible given basilar rales. retroperitoneal hematoma possible .  PMR decompensation or adrenal insufficiency a consideration at well.    Pulmonary stable relative to prior admits --  wound care in progress  Completing ABX-5/4

## 2018-05-02 NOTE — PROGRESS NOTE ADULT - ASSESSMENT
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    # Fever, resolved  # Recent septic Rt TKR - abx spacer exchanged and plastics complex wound closure  # Aspiration PNA, resolved  # Supratherapeutic INR/coagulopathy, resolved  # PMR on chronic steroids  # thigh hematoma, leg wound  # lower abd wound    Plan:  remains afebrile, cont Dapto per ID until 5/5, then suppressive minocycline, BC NTD, appreciate ID recs  h/h stable, heme consult Dr. Aaron for anemia  appreciate plastics surgery recs, local wound care per plastics, to stabilize wound before considering rehab  wound care c/s appreciated  appreciate ortho recs  will cont coumadin and asa  pain control, titrate to PO  PT OOB  bowel regimen    plan of care dw patient, NP

## 2018-05-02 NOTE — PROGRESS NOTE ADULT - ATTENDING COMMENTS
Patient seen and examined     Sharp debridement performed at bedside for medial thigh wound and abdominal wound    Dressings wet to dry with saline TID  Wound care follow up appreciated

## 2018-05-02 NOTE — PROGRESS NOTE ADULT - ASSESSMENT
no plan for orthopedic surgical intervention per family and patient preference  local wound care per plastics and wound care team  continue IV antibiotic treatment per infectious disease protocol  encourage patient to spend majority of bed oob in bedside chair as able  PT to help mobilize, she must remain 50% wb on the RLE and NO KNEE MOTION allowed, knee immobilizer if oob  coumadin, inr goal 2-3  continue to optimize nutrition  continue to involve psych, patient is more emotional yesterday	  needs bariatric bed  consult hematology consult given chronic anemia, epo candidate?	    needs clear plan delineated by prs/wound care teams for abdominal/thigh/knee wounds. SNF transfer is on hold at the moment until further progress is made with wound care per PRS/wound care.    Nate Bass MD  Attending Orthopedic Surgeon

## 2018-05-02 NOTE — PROVIDER CONTACT NOTE (MEDICATION) - ASSESSMENT
Pt finally agreed to let RN do dressing changes, also only agreed to take some medications. Pt had bm today

## 2018-05-02 NOTE — PROGRESS NOTE ADULT - SUBJECTIVE AND OBJECTIVE BOX
Patient is a 77y old  Female who presents with a chief complaint of fever (11 Apr 2018 14:25)      SUBJECTIVE / OVERNIGHT EVENTS:    Patient seen and examined. just changed bed and co severe pain.       Vital Signs Last 24 Hrs  T(C): 36.4 (02 May 2018 06:15), Max: 36.8 (01 May 2018 22:00)  T(F): 97.5 (02 May 2018 06:15), Max: 98.3 (01 May 2018 22:00)  HR: 82 (02 May 2018 06:15) (74 - 82)  BP: 136/73 (02 May 2018 06:15) (127/67 - 145/71)  BP(mean): --  RR: 18 (02 May 2018 06:15) (18 - 18)  SpO2: 94% (02 May 2018 06:15) (94% - 94%)  I&O's Summary    01 May 2018 07:01  -  02 May 2018 07:00  --------------------------------------------------------  IN: 940 mL / OUT: 0 mL / NET: 940 mL    02 May 2018 07:01  -  02 May 2018 12:26  --------------------------------------------------------  IN: 100 mL / OUT: 0 mL / NET: 100 mL        PE:  GENERAL: NAD, AAOx3, morbidly obese  HEAD:  Atraumatic, Normocephalic  EYES: EOMI, PERRLA, conjunctiva and sclera clear  NECK: Supple, No JVD  CHEST/LUNG: CTABL, No wheeze  HEART: Regular rate and rhythm; no murmur  ABDOMEN: Soft, Nontender, Nondistended; Bowel sounds present, lower abd wound  EXTREMITIES:  2+ Peripheral Pulses, right knee large eschar, right thigh with wound  SKIN: No rashes or lesions  NEURO: No focal deficits    LABS:                        8.2    9.17  )-----------( 318      ( 02 May 2018 09:35 )             26.3           PT/INR - ( 02 May 2018 09:30 )   PT: 24.5 sec;   INR: 2.13 ratio           CAPILLARY BLOOD GLUCOSE                RADIOLOGY & ADDITIONAL TESTS:    Imaging Personally Reviewed:  [x] YES  [ ] NO    Consultant(s) Notes Reviewed:  [x] YES  [ ] NO    MEDICATIONS  (STANDING):  ALPRAZolam 0.25 milliGRAM(s) Oral once  ascorbic acid 500 milliGRAM(s) Oral daily  aspirin enteric coated 81 milliGRAM(s) Oral daily  buDESOnide 160 MICROgram(s)/formoterol 4.5 MICROgram(s) Inhaler 2 Puff(s) Inhalation two times a day  calcium carbonate 1250 mG + Vitamin D (OsCal 500 + D) 1 Tablet(s) Oral three times a day  DAPTOmycin IVPB 800 milliGRAM(s) IV Intermittent every 24 hours  docusate sodium 100 milliGRAM(s) Oral three times a day  ferrous    sulfate 325 milliGRAM(s) Oral daily  folic acid 1 milliGRAM(s) Oral daily  melatonin 3 milliGRAM(s) Oral at bedtime  multivitamin 1 Tablet(s) Oral daily  nystatin    Suspension 829157 Unit(s) Oral every 6 hours  pantoprazole    Tablet 40 milliGRAM(s) Oral before breakfast  polyethylene glycol 3350 17 Gram(s) Oral daily  povidone iodine 10% Solution 1 Application(s) Topical three times a day  sodium chloride 0.9%. 1000 milliLiter(s) (75 mL/Hr) IV Continuous <Continuous>  tiotropium 18 MICROgram(s) Capsule 1 Capsule(s) Inhalation daily  warfarin 2 milliGRAM(s) Oral once  zinc oxide 20% Ointment 1 Application(s) Topical daily    MEDICATIONS  (PRN):  acetaminophen   Tablet 650 milliGRAM(s) Oral every 6 hours PRN For Temp greater than 38 C (100.4 F)  acetaminophen   Tablet. 650 milliGRAM(s) Oral every 6 hours PRN Mild Pain (1 - 3)  HYDROmorphone  Injectable 0.5 milliGRAM(s) IV Push every 6 hours PRN Severe Pain (7 - 10)  lidocaine 2% Viscous 10 milliLiter(s) Swish and Spit every 6 hours PRN Mouth Sores  oxyCODONE    IR 2.5 milliGRAM(s) Oral every 4 hours PRN Moderate Pain (4 - 6)  oxyCODONE    IR 2.5 milliGRAM(s) Oral once PRN Moderate Pain (4 - 6)  senna 2 Tablet(s) Oral at bedtime PRN Constipation      Care Discussed with Consultants/Other Providers [x] YES  [ ] NO    HEALTH ISSUES - PROBLEM Dx:  Adjustment disorder, unspecified type  Delirium due to another medical condition  Pneumonia: Pneumonia  Aortic stenosis, severe: Aortic stenosis, severe  MYAH (obstructive sleep apnea): MYAH (obstructive sleep apnea)  Obesity: Obesity  Asthma: Asthma  SOB (shortness of breath): SOB (shortness of breath)  Chronic deep vein thrombosis (DVT) of lower extremity, unspecified laterality, unspecified vein: Chronic deep vein thrombosis (DVT) of lower extremity, unspecified laterality, unspecified vein  Arthralgia of knee, unspecified laterality: Arthralgia of knee, unspecified laterality  Hyperlipidemia, unspecified hyperlipidemia type: Hyperlipidemia, unspecified hyperlipidemia type  Asthma, unspecified asthma severity, unspecified whether complicated, unspecified whether persistent: Asthma, unspecified asthma severity, unspecified whether complicated, unspecified whether persistent  Coronary artery disease involving native coronary artery of native heart without angina pectoris: Coronary artery disease involving native coronary artery of native heart without angina pectoris  Gastroesophageal reflux disease, esophagitis presence not specified: Gastroesophageal reflux disease, esophagitis presence not specified  Fever, unspecified fever cause: Fever, unspecified fever cause

## 2018-05-02 NOTE — PROGRESS NOTE ADULT - SUBJECTIVE AND OBJECTIVE BOX
pain controlled, awake and alert  afebrile    RLE  thigh with skin and fat necrosis, stable, further demarkating, no surrounding erythema, dressed by prs/wound care  surgical wound intact with stable eschar, some separation of eschar medially from skin, no exudate, dressed by prs/wound care  5/5 ta/ehl/gcs  silt l4-s1  2+ dp pulse

## 2018-05-02 NOTE — PROGRESS NOTE ADULT - SUBJECTIVE AND OBJECTIVE BOX
CC: f/u for septic prosthetic right knee    Patient reports she had all of her dressings changed and needs a tissue and she has pain in every part of her body    REVIEW OF SYSTEMS:  All other review of systems negative (Comprehensive ROS)    Antimicrobials Day #  :39/42  DAPTOmycin IVPB 800 milliGRAM(s) IV Intermittent every 24 hours  nystatin    Suspension 370132 Unit(s) Oral every 6 hours    Other Medications Reviewed    T(F): 98.1 (05-02-18 @ 14:25), Max: 98.3 (05-01-18 @ 22:00)  HR: 82 (05-02-18 @ 14:25)  BP: 136/80 (05-02-18 @ 14:25)  RR: 18 (05-02-18 @ 14:25)  SpO2: 96% (05-02-18 @ 14:25)  Wt(kg): --    PHYSICAL EXAM:  General: alert, crying acute distress  Eyes:  anicteric, no conjunctival injection, no discharge  Oropharynx: no lesions or injection 	  Neck: supple, without adenopathy  Lungs: clear to auscultation  Heart: regular rate and rhythm; no murmur, rubs or gallops  Abdomen: soft, obese distended, nontender, without mass or organomegaly, wound is   Skin: no lesions  Extremities: right thigh skin necrosed with fat necrosis. right knee with eschar  Neurologic: alert, oriented, moves all extremities    LAB RESULTS:                        8.2    9.17  )-----------( 318      ( 02 May 2018 09:35 )             26.3               MICROBIOLOGY:  RECENT CULTURES:  04-27 @ 18:18 Skin abdominal wound     No growth at 48 hours          RADIOLOGY REVIEWED:      < from: CT Abdomen and Pelvis No Cont (04.19.18 @ 19:06) >  IMPRESSION:   Slight interval improvement in bibasilar pneumonia.  Small amount of lenticular-shaped fluid between the right and the left   hepatic lobes.  Complex left renal lesion not well characterized on this exam but   unchanged since 12/6/2016. Continued follow-up is advised.      < end of copied text >      Assessment:  Obese female with nonhealing right knee wound after rudy and spacer for strept infection now s/p rudy and new spacer with mrsa infection, still with large eschar and area of necrosis of thigh from a hematoma. No uncontrolled  infection is apparent at present  Plan: complete 3 more days of daptomycin then po minocycline  continue local wound care per wound, plastics and ortho

## 2018-05-02 NOTE — PROGRESS NOTE ADULT - SUBJECTIVE AND OBJECTIVE BOX
Plastic Surgery Progress Note    S: Patient seen and examined.  no new events.    Vital Signs Last 24 Hrs  T(C): 36.4 (02 May 2018 06:15), Max: 36.8 (01 May 2018 22:00)  T(F): 97.5 (02 May 2018 06:15), Max: 98.3 (01 May 2018 22:00)  HR: 82 (02 May 2018 06:15) (74 - 82)  BP: 136/73 (02 May 2018 06:15) (127/67 - 145/71)  BP(mean): --  RR: 18 (02 May 2018 06:15) (18 - 18)  SpO2: 94% (02 May 2018 06:15) (94% - 95%)  I&O's Detail    30 Apr 2018 07:01  -  01 May 2018 07:00  --------------------------------------------------------  IN:    Oral Fluid: 600 mL  Total IN: 600 mL    OUT:  Total OUT: 0 mL    Total NET: 600 mL      01 May 2018 07:01  -  02 May 2018 06:55  --------------------------------------------------------  IN:    IV PiggyBack: 100 mL    Oral Fluid: 480 mL  Total IN: 580 mL    OUT:  Total OUT: 0 mL    Total NET: 580 mL          Physical Exam:  General: Awake and alert, NAD  RLE: knee eschar stable and clean, proximal thigh wound with some devitalized material, small wound on pannus with fibrinous material

## 2018-05-03 LAB
HCT VFR BLD CALC: 26.4 % — LOW (ref 34.5–45)
HGB BLD-MCNC: 8.4 G/DL — LOW (ref 11.5–15.5)
INR BLD: 2.4 RATIO — HIGH (ref 0.88–1.16)
MCHC RBC-ENTMCNC: 27.4 PG — SIGNIFICANT CHANGE UP (ref 27–34)
MCHC RBC-ENTMCNC: 31.8 GM/DL — LOW (ref 32–36)
MCV RBC AUTO: 86 FL — SIGNIFICANT CHANGE UP (ref 80–100)
PLATELET # BLD AUTO: 318 K/UL — SIGNIFICANT CHANGE UP (ref 150–400)
PROTHROM AB SERPL-ACNC: 27.6 SEC — HIGH (ref 10–13.1)
RBC # BLD: 3.07 M/UL — LOW (ref 3.8–5.2)
RBC # FLD: 17.4 % — HIGH (ref 10.3–14.5)
WBC # BLD: 9.71 K/UL — SIGNIFICANT CHANGE UP (ref 3.8–10.5)
WBC # FLD AUTO: 9.71 K/UL — SIGNIFICANT CHANGE UP (ref 3.8–10.5)

## 2018-05-03 PROCEDURE — 99232 SBSQ HOSP IP/OBS MODERATE 35: CPT

## 2018-05-03 RX ORDER — HYDROMORPHONE HYDROCHLORIDE 2 MG/ML
2 INJECTION INTRAMUSCULAR; INTRAVENOUS; SUBCUTANEOUS EVERY 4 HOURS
Qty: 0 | Refills: 0 | Status: DISCONTINUED | OUTPATIENT
Start: 2018-05-03 | End: 2018-05-08

## 2018-05-03 RX ORDER — WARFARIN SODIUM 2.5 MG/1
1.5 TABLET ORAL ONCE
Qty: 0 | Refills: 0 | Status: COMPLETED | OUTPATIENT
Start: 2018-05-03 | End: 2018-05-03

## 2018-05-03 RX ORDER — HYDROMORPHONE HYDROCHLORIDE 2 MG/ML
0.5 INJECTION INTRAMUSCULAR; INTRAVENOUS; SUBCUTANEOUS ONCE
Qty: 0 | Refills: 0 | Status: DISCONTINUED | OUTPATIENT
Start: 2018-05-03 | End: 2018-05-03

## 2018-05-03 RX ADMIN — BUDESONIDE AND FORMOTEROL FUMARATE DIHYDRATE 2 PUFF(S): 160; 4.5 AEROSOL RESPIRATORY (INHALATION) at 06:19

## 2018-05-03 RX ADMIN — TIOTROPIUM BROMIDE 1 CAPSULE(S): 18 CAPSULE ORAL; RESPIRATORY (INHALATION) at 12:17

## 2018-05-03 RX ADMIN — Medication 3 MILLIGRAM(S): at 22:00

## 2018-05-03 RX ADMIN — Medication 1 TABLET(S): at 12:17

## 2018-05-03 RX ADMIN — Medication 500 MILLIGRAM(S): at 12:16

## 2018-05-03 RX ADMIN — Medication 100 MILLIGRAM(S): at 22:00

## 2018-05-03 RX ADMIN — OXYCODONE HYDROCHLORIDE 2.5 MILLIGRAM(S): 5 TABLET ORAL at 06:18

## 2018-05-03 RX ADMIN — HYDROMORPHONE HYDROCHLORIDE 2 MILLIGRAM(S): 2 INJECTION INTRAMUSCULAR; INTRAVENOUS; SUBCUTANEOUS at 22:30

## 2018-05-03 RX ADMIN — PANTOPRAZOLE SODIUM 40 MILLIGRAM(S): 20 TABLET, DELAYED RELEASE ORAL at 06:18

## 2018-05-03 RX ADMIN — Medication 500000 UNIT(S): at 15:21

## 2018-05-03 RX ADMIN — OXYCODONE HYDROCHLORIDE 2.5 MILLIGRAM(S): 5 TABLET ORAL at 06:48

## 2018-05-03 RX ADMIN — OXYCODONE HYDROCHLORIDE 2.5 MILLIGRAM(S): 5 TABLET ORAL at 10:43

## 2018-05-03 RX ADMIN — Medication 1 TABLET(S): at 15:19

## 2018-05-03 RX ADMIN — Medication 1 APPLICATION(S): at 15:20

## 2018-05-03 RX ADMIN — HYDROMORPHONE HYDROCHLORIDE 2 MILLIGRAM(S): 2 INJECTION INTRAMUSCULAR; INTRAVENOUS; SUBCUTANEOUS at 22:00

## 2018-05-03 RX ADMIN — Medication 81 MILLIGRAM(S): at 12:16

## 2018-05-03 RX ADMIN — Medication 325 MILLIGRAM(S): at 12:16

## 2018-05-03 RX ADMIN — OXYCODONE HYDROCHLORIDE 2.5 MILLIGRAM(S): 5 TABLET ORAL at 11:13

## 2018-05-03 RX ADMIN — Medication 1 MILLIGRAM(S): at 12:16

## 2018-05-03 RX ADMIN — HYDROMORPHONE HYDROCHLORIDE 2 MILLIGRAM(S): 2 INJECTION INTRAMUSCULAR; INTRAVENOUS; SUBCUTANEOUS at 15:46

## 2018-05-03 RX ADMIN — Medication 1 TABLET(S): at 06:18

## 2018-05-03 RX ADMIN — HYDROMORPHONE HYDROCHLORIDE 0.5 MILLIGRAM(S): 2 INJECTION INTRAMUSCULAR; INTRAVENOUS; SUBCUTANEOUS at 03:32

## 2018-05-03 RX ADMIN — Medication 100 MILLIGRAM(S): at 06:18

## 2018-05-03 RX ADMIN — WARFARIN SODIUM 1.5 MILLIGRAM(S): 2.5 TABLET ORAL at 22:00

## 2018-05-03 RX ADMIN — POLYETHYLENE GLYCOL 3350 17 GRAM(S): 17 POWDER, FOR SOLUTION ORAL at 12:17

## 2018-05-03 RX ADMIN — Medication 1 TABLET(S): at 22:00

## 2018-05-03 RX ADMIN — DAPTOMYCIN 132 MILLIGRAM(S): 500 INJECTION, POWDER, LYOPHILIZED, FOR SOLUTION INTRAVENOUS at 09:07

## 2018-05-03 RX ADMIN — HYDROMORPHONE HYDROCHLORIDE 2 MILLIGRAM(S): 2 INJECTION INTRAMUSCULAR; INTRAVENOUS; SUBCUTANEOUS at 15:16

## 2018-05-03 RX ADMIN — ZINC OXIDE 1 APPLICATION(S): 200 OINTMENT TOPICAL at 15:21

## 2018-05-03 RX ADMIN — BUDESONIDE AND FORMOTEROL FUMARATE DIHYDRATE 2 PUFF(S): 160; 4.5 AEROSOL RESPIRATORY (INHALATION) at 18:11

## 2018-05-03 RX ADMIN — Medication 500000 UNIT(S): at 22:00

## 2018-05-03 RX ADMIN — HYDROMORPHONE HYDROCHLORIDE 0.5 MILLIGRAM(S): 2 INJECTION INTRAMUSCULAR; INTRAVENOUS; SUBCUTANEOUS at 03:02

## 2018-05-03 RX ADMIN — Medication 100 MILLIGRAM(S): at 15:19

## 2018-05-03 RX ADMIN — Medication 500000 UNIT(S): at 06:18

## 2018-05-03 RX ADMIN — Medication 500000 UNIT(S): at 09:08

## 2018-05-03 NOTE — PROGRESS NOTE ADULT - ASSESSMENT
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    # Fever, resolved  # Recent septic Rt TKR - abx spacer exchanged and plastics complex wound closure  # Aspiration PNA, resolved  # Supratherapeutic INR/coagulopathy, resolved  # PMR on chronic steroids  # thigh hematoma, fat necrosis  # lower abd wound    Plan:  remains afebrile, cont Dapto until 5/5, then suppressive minocycline, BC NTD, appreciate ID recs  appreciate heme recs  appreciate plastics surgery recs, local wound care, to stabilize wounds prior to rehab  appreciate ortho recs  will cont coumadin and asa  pain control, titrate to PO  PT OOB more ambulation  bowel regimen    plan of care dw patient, NP

## 2018-05-03 NOTE — PROGRESS NOTE ADULT - ASSESSMENT
Plan:  -WTD with normal saline to all wounds TID - will be long term wound care  - pt requesting second opinion for wound care

## 2018-05-03 NOTE — PROGRESS NOTE ADULT - SUBJECTIVE AND OBJECTIVE BOX
Patient is a 77y old  Female who presents with a chief complaint of fever (11 Apr 2018 14:25)      SUBJECTIVE / OVERNIGHT EVENTS:    Patient seen and examined. denies cp sob. in better spirits today.       Vital Signs Last 24 Hrs  T(C): 36.6 (03 May 2018 11:19), Max: 37.1 (03 May 2018 01:07)  T(F): 97.9 (03 May 2018 11:19), Max: 98.7 (03 May 2018 01:07)  HR: 81 (03 May 2018 11:19) (79 - 83)  BP: 132/72 (03 May 2018 11:19) (132/72 - 143/74)  BP(mean): --  RR: 18 (03 May 2018 11:19) (18 - 20)  SpO2: 94% (03 May 2018 11:19) (94% - 96%)  I&O's Summary    02 May 2018 07:01  -  03 May 2018 07:00  --------------------------------------------------------  IN: 820 mL / OUT: 350 mL / NET: 470 mL    03 May 2018 07:01  -  03 May 2018 12:12  --------------------------------------------------------  IN: 290 mL / OUT: 200 mL / NET: 90 mL        PE:  GENERAL: NAD, AAOx3, obese  HEAD:  Atraumatic, Normocephalic  EYES: EOMI, PERRLA, conjunctiva and sclera clear  NECK: Supple, No JVD  CHEST/LUNG: CTABL, No wheeze  HEART: Regular rate and rhythm; no murmur  ABDOMEN: Soft, Nontender, Nondistended; Bowel sounds present  EXTREMITIES:  right knee eschar, right thigh necrosis, lower abd wound  SKIN: No rashes or lesions  NEURO: No focal deficits    LABS:                        8.4    9.71  )-----------( 318      ( 03 May 2018 08:01 )             26.4           PT/INR - ( 03 May 2018 09:43 )   PT: 27.6 sec;   INR: 2.40 ratio           CAPILLARY BLOOD GLUCOSE                RADIOLOGY & ADDITIONAL TESTS:    Imaging Personally Reviewed:  [x] YES  [ ] NO    Consultant(s) Notes Reviewed:  [x] YES  [ ] NO    MEDICATIONS  (STANDING):  ALPRAZolam 0.25 milliGRAM(s) Oral once  ascorbic acid 500 milliGRAM(s) Oral daily  aspirin enteric coated 81 milliGRAM(s) Oral daily  buDESOnide 160 MICROgram(s)/formoterol 4.5 MICROgram(s) Inhaler 2 Puff(s) Inhalation two times a day  calcium carbonate 1250 mG + Vitamin D (OsCal 500 + D) 1 Tablet(s) Oral three times a day  DAPTOmycin IVPB 800 milliGRAM(s) IV Intermittent every 24 hours  docusate sodium 100 milliGRAM(s) Oral three times a day  ferrous    sulfate 325 milliGRAM(s) Oral daily  folic acid 1 milliGRAM(s) Oral daily  melatonin 3 milliGRAM(s) Oral at bedtime  multivitamin 1 Tablet(s) Oral daily  nystatin    Suspension 265157 Unit(s) Oral every 6 hours  pantoprazole    Tablet 40 milliGRAM(s) Oral before breakfast  polyethylene glycol 3350 17 Gram(s) Oral daily  povidone iodine 10% Solution 1 Application(s) Topical three times a day  sodium chloride 0.9%. 1000 milliLiter(s) (75 mL/Hr) IV Continuous <Continuous>  tiotropium 18 MICROgram(s) Capsule 1 Capsule(s) Inhalation daily  warfarin 1.5 milliGRAM(s) Oral once  zinc oxide 20% Ointment 1 Application(s) Topical daily    MEDICATIONS  (PRN):  acetaminophen   Tablet 650 milliGRAM(s) Oral every 6 hours PRN For Temp greater than 38 C (100.4 F)  acetaminophen   Tablet. 650 milliGRAM(s) Oral every 6 hours PRN Mild Pain (1 - 3)  lidocaine 2% Viscous 10 milliLiter(s) Swish and Spit every 6 hours PRN Mouth Sores  oxyCODONE    IR 2.5 milliGRAM(s) Oral every 4 hours PRN Moderate Pain (4 - 6)  oxyCODONE    IR 2.5 milliGRAM(s) Oral once PRN Moderate Pain (4 - 6)  senna 2 Tablet(s) Oral at bedtime PRN Constipation      Care Discussed with Consultants/Other Providers [x] YES  [ ] NO    HEALTH ISSUES - PROBLEM Dx:  Adjustment disorder, unspecified type  Delirium due to another medical condition  Pneumonia: Pneumonia  Aortic stenosis, severe: Aortic stenosis, severe  MYAH (obstructive sleep apnea): MYAH (obstructive sleep apnea)  Obesity: Obesity  Asthma: Asthma  SOB (shortness of breath): SOB (shortness of breath)  Chronic deep vein thrombosis (DVT) of lower extremity, unspecified laterality, unspecified vein: Chronic deep vein thrombosis (DVT) of lower extremity, unspecified laterality, unspecified vein  Arthralgia of knee, unspecified laterality: Arthralgia of knee, unspecified laterality  Hyperlipidemia, unspecified hyperlipidemia type: Hyperlipidemia, unspecified hyperlipidemia type  Asthma, unspecified asthma severity, unspecified whether complicated, unspecified whether persistent: Asthma, unspecified asthma severity, unspecified whether complicated, unspecified whether persistent  Coronary artery disease involving native coronary artery of native heart without angina pectoris: Coronary artery disease involving native coronary artery of native heart without angina pectoris  Gastroesophageal reflux disease, esophagitis presence not specified: Gastroesophageal reflux disease, esophagitis presence not specified  Fever, unspecified fever cause: Fever, unspecified fever cause

## 2018-05-03 NOTE — PROGRESS NOTE ADULT - ATTENDING COMMENTS
as above-  Pulm relatively stable-atelectasis, prior PE, asthma, cardiac Dz  Inhaler regimen as outlined above  DVT rx /prophylaxis  ID follow up of ABX  (IV for 42 days total then PO minocycline)-to complet 5/4   PT evaluation and Psych evaluation    ortho/plastics follow up--?AKA-if conservative rx-not successful  Rehab pending    Everett Kumar MD-Pulmonary   [phone] as above-  Pulm relatively stable-atelectasis, prior PE, asthma, cardiac Dz  Inhaler regimen as outlined above  DVT rx /prophylaxis  ID follow up of ABX  (IV for 42 days total then PO minocycline)-to complete 5/4--then minocycline   PT evaluation and Psych evaluation    ortho/plastics follow up--?AKA-if conservative rx-not successful  Rehab pending    Everett Kumar MD-Pulmonary   [phone]

## 2018-05-03 NOTE — PROGRESS NOTE ADULT - SUBJECTIVE AND OBJECTIVE BOX
Follow up visit for abdominal wounds  Spoke with  by phone      aspirin enteric coated 81 milliGRAM(s) Oral daily  DAPTOmycin IVPB 800 milliGRAM(s) IV Intermittent every 24 hours  nystatin    Suspension 192575 Unit(s) Oral every 6 hours	    Physical Exam:  Vital Signs Last 24 Hrs  T(C): 36.9 (03 May 2018 06:10), Max: 37.1 (03 May 2018 01:07)  T(F): 98.4 (03 May 2018 06:10), Max: 98.7 (03 May 2018 01:07)  HR: 81 (03 May 2018 06:10) (79 - 83)  BP: 137/67 (03 May 2018 06:10) (136/80 - 143/74)  BP(mean): --  RR: 18 (03 May 2018 06:10) (18 - 20)  SpO2: 94% (03 May 2018 06:10) (94% - 96%)    patient remains confined to bed- encouraged to attempt to be positioned side to side  Uses nasal O2 at bedrest, but tolerates supine position  Is tearful with any discussion    Abdominal wound with less additional drainage, approx 2 cm in Diam., with limited tissue necrosis  Aquacel placed in wound- less traumatic on removal    Right leg wounds with clean dressings, WTD         LABS:                        8.4    9.71  )-----------( 318      ( 03 May 2018 08:01 )             26.4     PT/INR - ( 03 May 2018 09:43 )   PT: 27.6 sec;   INR: 2.40 ratio           ability to dress wounds remains limited by patient tolerance, but there is no cellulitis associated with any wound

## 2018-05-03 NOTE — PROGRESS NOTE ADULT - PROBLEM SELECTOR PLAN 9
abnormal CT--c/w aspiration  Sp and Sw evaluation--aspiration precautions--ABX as per ID-total of 7 d overlap-completed -now on daptomycin alone-as per ID until 5/4

## 2018-05-03 NOTE — PROGRESS NOTE ADULT - SUBJECTIVE AND OBJECTIVE BOX
CC: f/u for  mrsa prosthetic knee infection  Patient reports she is very comfortable right now    REVIEW OF SYSTEMS:  All other review of systems negative (Comprehensive ROS)    Antimicrobials Day #  :40/42  DAPTOmycin IVPB 800 milliGRAM(s) IV Intermittent every 24 hours  nystatin    Suspension 058752 Unit(s) Oral every 6 hours    Other Medications Reviewed    T(F): 98 (05-03-18 @ 14:50), Max: 98.7 (05-03-18 @ 01:07)  HR: 81 (05-03-18 @ 14:50)  BP: 143/71 (05-03-18 @ 14:50)  RR: 18 (05-03-18 @ 14:50)  SpO2: 96% (05-03-18 @ 14:50)  Wt(kg): --    PHYSICAL EXAM:  General: alert, no acute distress  Eyes:  anicteric, no conjunctival injection, no discharge  Oropharynx: no lesions or injection 	  Neck: supple, without adenopathy  Lungs: clear anterior to auscultation  Heart: regular rate and rhythm; no murmur, rubs or gallops  Abdomen: soft, nondistended, nontender, without mass or organomegaly  Skin: no lesions  Extremities right leg dressings in place, no surrounding cellulitis  Neurologic: alert, oriented, moves all extremities    LAB RESULTS:                        8.4    9.71  )-----------( 318      ( 03 May 2018 08:01 )             26.4               MICROBIOLOGY:  RECENT CULTURES:      RADIOLOGY REVIEWED:    < from: CT Abdomen and Pelvis No Cont (04.19.18 @ 19:06) >  IMPRESSION:   Slight interval improvement in bibasilar pneumonia.  Small amount of lenticular-shaped fluid between the right and the left   hepatic lobes.  Complex left renal lesion not well characterized on this exam but   unchanged since 12/6/2016. Continued follow-up is advised.      < from: CT Femur No Cont, Right (04.19.18 @ 19:06) >  IMPRESSION:  1.  Patient status post explantation of right total knee arthroplasty and   washout. Antibiotic cement and intramedullary ruthann are identified in the   right knee joint. No evidence of acute hardware complication.  2.  No focal, drainable fluid collections are identified on today's   examination.  3.  Nonspecific subcutaneous edema involving the patient's pannus and   subcutaneous fat of the medial thigh.      < end of copied text >    Assessment:  Patient with infected right tkr with strept several months ago s/p rudy, spacer then poor wound healing so had rudy and new spacer with mrsa grown so on dapto finishing 6 weeks. Still poor wound healing over knee followed by plastics. Had hematoma of thigh with skin breakdown and fat necrosis with same on abdomen being treated with local care. s/p tx for pneumonia for which she returned from rehab  Plan: 2 more days of dapto then suppressive minocin  local care to wounds per plastics and wound care team  check cpk in am

## 2018-05-03 NOTE — PROGRESS NOTE ADULT - ASSESSMENT
no plan for orthopedic surgical intervention per family and patient preference  local wound care per plastics and wound care team  continue IV antibiotic treatment per infectious disease protocol  encourage patient to spend majority of bed oob in bedside chair as able  PT to help mobilize, she must remain 50% wb on the RLE and NO KNEE MOTION allowed, knee immobilizer if oob  coumadin, inr goal 2-3  continue to optimize nutrition  continue to involve psych, patient is more emotional yesterday	  bariatric bed now set up  appreciate hematology consult for chronic anemia, despite chronic anemia which she is tolerating, would prefer hb>12 if possible to improve oxygen supply for wound healing    needs clear plan delineated by prs/wound care teams for abdominal/thigh/knee wounds. SNF transfer is on hold at the moment until further progress is made with wound care per PRS/wound care.    Nate Bass MD  Attending Orthopedic Surgeon

## 2018-05-03 NOTE — PROGRESS NOTE ADULT - SUBJECTIVE AND OBJECTIVE BOX
Plastic Surgery Progress Note    S: Patient seen and examined.  complaining of pain with dressing changes.    Vital Signs Last 24 Hrs  T(C): 36.9 (03 May 2018 06:10), Max: 37.1 (03 May 2018 01:07)  T(F): 98.4 (03 May 2018 06:10), Max: 98.7 (03 May 2018 01:07)  HR: 81 (03 May 2018 06:10) (79 - 84)  BP: 137/67 (03 May 2018 06:10) (124/70 - 143/74)  BP(mean): --  RR: 18 (03 May 2018 06:10) (18 - 20)  SpO2: 94% (03 May 2018 06:10) (94% - 97%)  I&O's Detail    01 May 2018 07:01  -  02 May 2018 07:00  --------------------------------------------------------  IN:    IV PiggyBack: 100 mL    Oral Fluid: 840 mL  Total IN: 940 mL    OUT:  Total OUT: 0 mL    Total NET: 940 mL      02 May 2018 07:01  -  03 May 2018 06:57  --------------------------------------------------------  IN:    IV PiggyBack: 100 mL    Oral Fluid: 480 mL  Total IN: 580 mL    OUT:  Total OUT: 0 mL    Total NET: 580 mL          Physical Exam:  General: Awake and alert, NAD  RLE: knee eschar stable, clean, incision healing.  proximal thigh wound and abd wound stable

## 2018-05-03 NOTE — STUDENT SIGN OFF DOCUMENT - CO-SIGNATURE DATE
03-May-2018 16:08
16-Apr-2018 15:51
17-Apr-2018 16:23
23-Apr-2018 16:25
26-Apr-2018 16:25
30-Apr-2018 15:59
20-Apr-2018 15:50

## 2018-05-03 NOTE — STUDENT SIGN OFF DOCUMENT - STUDENT DOCUMENT REVIEW
Latanya Keith, SPT
Latnaya Keith, SPT
Latanya Keith, SPT

## 2018-05-03 NOTE — CONSULT NOTE ADULT - ASSESSMENT
77 year old female who was admitted several weeks ago with aspiration pneumonia and fever with right knee pain. She also had proximal thigh swelling and there was a hematoma of the leg as well. She has a history of PMR on steroids. She also has a history of tavr, AICD andf remote bilateral TKR and was found to have strep bacteremia and a ERIKA was negative and she also had a eschar of the right knee as well. She was placed on iv antibiotics and I understand that she will be completing the antibiotics, daptomycin on this date. She told me that she had anemia in the distant past and was placed on antibiotics. She is being seen by the plastics service as well. A ct of th femur was not remarkable for infection and a ct of the chest done several weeks ago showed bibasilar PNA. She was also noted to have a left renal lesion that is unchanged now since 12/2016.     The consult was requested to see if there is anything we can do for her anemia. The counts on the day of this admission was white cell count of 9.71 hemoglobin 8.4 hematocrit 26.4 MCV 86 and MCHC 31.8 and platelet count of 191159 the diff count was 74 polys 15 lymphs and 7 monos the bun was 9 and the creatinine was 0.6     THe anemia as described is likely from anemia of inflammation. I would send off serum iron tibc and retic count and ferritin with a guaiac. She may be a candidate for epo to reduce transfusion needs, will follow.

## 2018-05-03 NOTE — STUDENT SIGN OFF DOCUMENT - COPY OF STUDENT DOCUMENT REVIEW
physical therapy

## 2018-05-03 NOTE — CONSULT NOTE ADULT - SUBJECTIVE AND OBJECTIVE BOX
Pt is a 77 year old female who was admitted several weeks ago with aspiration pneumonia and fever with right knee pain. She also had proximal thigh swelling and there was a hematoma of the leg as well. She has a history of PMR on steroids. She also has a history of tavr, AICD andf remote bilateral TKR and was found to have strep bacteremia and a ERIKA was negative and she also had a eschar of the right knee as well. She was placed on iv antibiotics and I understand that she will be completing the antibiotics, daptomycin on this date. She told me that she had anemia in the distant past and was placed on antibiotics. She is being seen by the plastics service as well. A ct of th femur was not remarkable for infection and a ct of the chest done several weeks ago showed bibasilar PNA. She was also noted to have a left renal lesion that is unchanged now since 12/2016.     The consult was requested to see if there is anything we can do for her anemia. The counts on the day of this admission was white cell count of 9.71 hemoglobin 8.4 hematocrit 26.4 MCV 86 and MCHC 31.8 and platelet count of 178744 the diff count was 74 polys 15 lymphs and 7 monos the bun was 9 and the creatinine was 0.6      pmh as above in the body of the report  FH/SH no  contrib  MEDICATIONS  (STANDING):  ALPRAZolam 0.25 milliGRAM(s) Oral once  ascorbic acid 500 milliGRAM(s) Oral daily  aspirin enteric coated 81 milliGRAM(s) Oral daily  buDESOnide 160 MICROgram(s)/formoterol 4.5 MICROgram(s) Inhaler 2 Puff(s) Inhalation two times a day  calcium carbonate 1250 mG + Vitamin D (OsCal 500 + D) 1 Tablet(s) Oral three times a day  DAPTOmycin IVPB 800 milliGRAM(s) IV Intermittent every 24 hours  docusate sodium 100 milliGRAM(s) Oral three times a day  ferrous    sulfate 325 milliGRAM(s) Oral daily  folic acid 1 milliGRAM(s) Oral daily  melatonin 3 milliGRAM(s) Oral at bedtime  multivitamin 1 Tablet(s) Oral daily  nystatin    Suspension 176181 Unit(s) Oral every 6 hours  pantoprazole    Tablet 40 milliGRAM(s) Oral before breakfast  polyethylene glycol 3350 17 Gram(s) Oral daily  povidone iodine 10% Solution 1 Application(s) Topical three times a day  sodium chloride 0.9%. 1000 milliLiter(s) (75 mL/Hr) IV Continuous <Continuous>  tiotropium 18 MICROgram(s) Capsule 1 Capsule(s) Inhalation daily  warfarin 1.5 milliGRAM(s) Oral once  zinc oxide 20% Ointment 1 Application(s) Topical daily  Vital Signs Last 24 Hrs  T(C): 36.9 (03 May 2018 06:10), Max: 37.1 (03 May 2018 01:07)  T(F): 98.4 (03 May 2018 06:10), Max: 98.7 (03 May 2018 01:07)  HR: 81 (03 May 2018 06:10) (79 - 83)  BP: 137/67 (03 May 2018 06:10) (136/80 - 143/74)  BP(mean): --  RR: 18 (03 May 2018 06:10) (18 - 20)  SpO2: 94% (03 May 2018 06:10) (94% - 96%)                        8.4    9.71  )-----------( 318      ( 03 May 2018 08:01 )             26.4   bun 9 creatinine 0.66

## 2018-05-03 NOTE — PROGRESS NOTE ADULT - SUBJECTIVE AND OBJECTIVE BOX
CHIEF COMPLAINT:    Interval Events:    REVIEW OF SYSTEMS:  Constitutional: No fevers or chills. No weight loss. No fatigue or generalized malaise.  Eyes: No itching or discharge from the eyes  ENT: No ear pain. No ear discharge. No nasal congestion. No post nasal drip. No epistaxis. No throat pain. No sore throat. No difficulty swallowing.   CV: No chest pain. No palpitations. No lightheadedness or dizziness.   Resp: No dyspnea at rest. No dyspnea on exertion. No orthopnea. No wheezing. No cough. No stridor. No sputum production. No chest pain with respiration.  GI: No nausea. No vomiting. No diarrhea.  MSK: No joint pain or pain in any extremities  Integumentary: No skin lesions. No pedal edema.  Neurological: No gross motor weakness. No sensory changes.  [ ] All other systems negative  [ ] Unable to assess ROS because ________    OBJECTIVE:  ICU Vital Signs Last 24 Hrs  T(C): 37.1 (03 May 2018 01:07), Max: 37.1 (03 May 2018 01:07)  T(F): 98.7 (03 May 2018 01:07), Max: 98.7 (03 May 2018 01:07)  HR: 79 (03 May 2018 01:07) (79 - 84)  BP: 137/71 (03 May 2018 01:07) (124/70 - 143/74)  BP(mean): --  ABP: --  ABP(mean): --  RR: 20 (03 May 2018 01:07) (18 - 20)  SpO2: 96% (03 May 2018 01:07) (94% - 97%)        05-01 @ 07:01  -  05-02 @ 07:00  --------------------------------------------------------  IN: 940 mL / OUT: 0 mL / NET: 940 mL    05-02 @ 07:01  -  05-03 @ 05:07  --------------------------------------------------------  IN: 580 mL / OUT: 0 mL / NET: 580 mL      CAPILLARY BLOOD GLUCOSE          PHYSICAL EXAM:  General: Awake, alert, oriented X 3.   HEENT: Atraumatic, normocephalic.                 Mallampatti Grade                 No nasal congestion.                No tonsillar or pharyngeal exudates.  Lymph Nodes: No palpable lymphadenopathy  Neck: No JVD. No carotid bruit.   Respiratory: Normal chest expansion                         Normal percussion                         Normal and equal air entry                         No wheeze, rhonchi or rales.  Cardiovascular: S1 S2 normal. No murmurs, rubs or gallops.   Abdomen: Soft, non-tender, non-distended. No organomegaly.  Extremities: Warm to touch. Peripheral pulse palpable. No pedal edema.   Skin: No rashes or skin lesions  Neurological: Motor and sensory examination equal and normal in all four extremities.  Psychiatry: Appropriate mood and affect.    HOSPITAL MEDICATIONS:  MEDICATIONS  (STANDING):  ALPRAZolam 0.25 milliGRAM(s) Oral once  ascorbic acid 500 milliGRAM(s) Oral daily  aspirin enteric coated 81 milliGRAM(s) Oral daily  buDESOnide 160 MICROgram(s)/formoterol 4.5 MICROgram(s) Inhaler 2 Puff(s) Inhalation two times a day  calcium carbonate 1250 mG + Vitamin D (OsCal 500 + D) 1 Tablet(s) Oral three times a day  DAPTOmycin IVPB 800 milliGRAM(s) IV Intermittent every 24 hours  docusate sodium 100 milliGRAM(s) Oral three times a day  ferrous    sulfate 325 milliGRAM(s) Oral daily  folic acid 1 milliGRAM(s) Oral daily  melatonin 3 milliGRAM(s) Oral at bedtime  multivitamin 1 Tablet(s) Oral daily  nystatin    Suspension 694604 Unit(s) Oral every 6 hours  pantoprazole    Tablet 40 milliGRAM(s) Oral before breakfast  polyethylene glycol 3350 17 Gram(s) Oral daily  povidone iodine 10% Solution 1 Application(s) Topical three times a day  sodium chloride 0.9%. 1000 milliLiter(s) (75 mL/Hr) IV Continuous <Continuous>  tiotropium 18 MICROgram(s) Capsule 1 Capsule(s) Inhalation daily  zinc oxide 20% Ointment 1 Application(s) Topical daily    MEDICATIONS  (PRN):  acetaminophen   Tablet 650 milliGRAM(s) Oral every 6 hours PRN For Temp greater than 38 C (100.4 F)  acetaminophen   Tablet. 650 milliGRAM(s) Oral every 6 hours PRN Mild Pain (1 - 3)  lidocaine 2% Viscous 10 milliLiter(s) Swish and Spit every 6 hours PRN Mouth Sores  oxyCODONE    IR 2.5 milliGRAM(s) Oral every 4 hours PRN Moderate Pain (4 - 6)  oxyCODONE    IR 2.5 milliGRAM(s) Oral once PRN Moderate Pain (4 - 6)  senna 2 Tablet(s) Oral at bedtime PRN Constipation      LABS:                        8.2    9.17  )-----------( 318      ( 02 May 2018 09:35 )             26.3           PT/INR - ( 02 May 2018 09:30 )   PT: 24.5 sec;   INR: 2.13 ratio                   MICROBIOLOGY:     RADIOLOGY:  [ ] Reviewed and interpreted by me    Point of Care Ultrasound Findings:    PFT:    EKG: CHIEF COMPLAINT: emotional--still pain at leg-when cleaning    Interval Events: ortho/plastics    REVIEW OF SYSTEMS:  Constitutional: No fevers or chills. No weight loss. + fatigue or generalized malaise.  Eyes: No itching or discharge from the eyes  ENT: No ear pain. No ear discharge. No nasal congestion. No post nasal drip. No epistaxis. No throat pain. No sore throat. No difficulty swallowing.   CV: No chest pain. No palpitations. No lightheadedness or dizziness.   Resp: No dyspnea at rest. + dyspnea on exertion. No orthopnea. No wheezing. No cough. No stridor. No sputum production. No chest pain with respiration.  GI: No nausea. No vomiting. No diarrhea.  MSK: No joint pain or pain in any extremities  Integumentary: No skin lesions. No pedal edema.  Neurological: No gross motor weakness. No sensory changes.  [+ ] All other systems negative  [ ] Unable to assess ROS because ________    OBJECTIVE:  ICU Vital Signs Last 24 Hrs  T(C): 37.1 (03 May 2018 01:07), Max: 37.1 (03 May 2018 01:07)  T(F): 98.7 (03 May 2018 01:07), Max: 98.7 (03 May 2018 01:07)  HR: 79 (03 May 2018 01:07) (79 - 84)  BP: 137/71 (03 May 2018 01:07) (124/70 - 143/74)  BP(mean): --  ABP: --  ABP(mean): --  RR: 20 (03 May 2018 01:07) (18 - 20)  SpO2: 96% (03 May 2018 01:07) (94% - 97%)        05-01 @ 07:01 - 05-02 @ 07:00  --------------------------------------------------------  IN: 940 mL / OUT: 0 mL / NET: 940 mL    05-02 @ 07:01  -  05-03 @ 05:07  --------------------------------------------------------  IN: 580 mL / OUT: 0 mL / NET: 580 mL      CAPILLARY BLOOD GLUCOSE          PHYSICAL EXAM: NAD in bed on NC O2  General: Awake, alert, oriented X 3.   HEENT: Atraumatic, normocephalic.                 Mallampatti Grade 3                No nasal congestion.                No tonsillar or pharyngeal exudates.  Lymph Nodes: No palpable lymphadenopathy  Neck: No JVD. No carotid bruit.   Respiratory: abnormal chest expansion--reduced BS bases                         Normal percussion                         Normal and equal air entry                         No wheeze, rhonchi or rales.  Cardiovascular: S1 S2 normal. No murmurs, rubs or gallops.   Abdomen: Soft, non-tender, non-distended. No organomegaly.  Extremities: Warm to touch. Peripheral pulse palpable. No pedal edema. RLE wound  Skin: No rashes or skin lesions  Neurological: Motor and sensory examination equal and normal in all four extremities.  Psychiatry: Appropriate mood and affect.    HOSPITAL MEDICATIONS:  MEDICATIONS  (STANDING):  ALPRAZolam 0.25 milliGRAM(s) Oral once  ascorbic acid 500 milliGRAM(s) Oral daily  aspirin enteric coated 81 milliGRAM(s) Oral daily  buDESOnide 160 MICROgram(s)/formoterol 4.5 MICROgram(s) Inhaler 2 Puff(s) Inhalation two times a day  calcium carbonate 1250 mG + Vitamin D (OsCal 500 + D) 1 Tablet(s) Oral three times a day  DAPTOmycin IVPB 800 milliGRAM(s) IV Intermittent every 24 hours  docusate sodium 100 milliGRAM(s) Oral three times a day  ferrous    sulfate 325 milliGRAM(s) Oral daily  folic acid 1 milliGRAM(s) Oral daily  melatonin 3 milliGRAM(s) Oral at bedtime  multivitamin 1 Tablet(s) Oral daily  nystatin    Suspension 920111 Unit(s) Oral every 6 hours  pantoprazole    Tablet 40 milliGRAM(s) Oral before breakfast  polyethylene glycol 3350 17 Gram(s) Oral daily  povidone iodine 10% Solution 1 Application(s) Topical three times a day  sodium chloride 0.9%. 1000 milliLiter(s) (75 mL/Hr) IV Continuous <Continuous>  tiotropium 18 MICROgram(s) Capsule 1 Capsule(s) Inhalation daily  zinc oxide 20% Ointment 1 Application(s) Topical daily    MEDICATIONS  (PRN):  acetaminophen   Tablet 650 milliGRAM(s) Oral every 6 hours PRN For Temp greater than 38 C (100.4 F)  acetaminophen   Tablet. 650 milliGRAM(s) Oral every 6 hours PRN Mild Pain (1 - 3)  lidocaine 2% Viscous 10 milliLiter(s) Swish and Spit every 6 hours PRN Mouth Sores  oxyCODONE    IR 2.5 milliGRAM(s) Oral every 4 hours PRN Moderate Pain (4 - 6)  oxyCODONE    IR 2.5 milliGRAM(s) Oral once PRN Moderate Pain (4 - 6)  senna 2 Tablet(s) Oral at bedtime PRN Constipation      LABS:                        8.2    9.17  )-----------( 318      ( 02 May 2018 09:35 )             26.3           PT/INR - ( 02 May 2018 09:30 )   PT: 24.5 sec;   INR: 2.13 ratio                   MICROBIOLOGY:     RADIOLOGY:  [ ] Reviewed and interpreted by me    Point of Care Ultrasound Findings:    PFT:    EKG:

## 2018-05-04 LAB
ANION GAP SERPL CALC-SCNC: 11 MMOL/L — SIGNIFICANT CHANGE UP (ref 5–17)
BUN SERPL-MCNC: 8 MG/DL — SIGNIFICANT CHANGE UP (ref 7–23)
CALCIUM SERPL-MCNC: 8.6 MG/DL — SIGNIFICANT CHANGE UP (ref 8.4–10.5)
CHLORIDE SERPL-SCNC: 97 MMOL/L — SIGNIFICANT CHANGE UP (ref 96–108)
CO2 SERPL-SCNC: 29 MMOL/L — SIGNIFICANT CHANGE UP (ref 22–31)
CREAT SERPL-MCNC: 0.61 MG/DL — SIGNIFICANT CHANGE UP (ref 0.5–1.3)
GLUCOSE SERPL-MCNC: 104 MG/DL — HIGH (ref 70–99)
INR BLD: 2.7 RATIO — HIGH (ref 0.88–1.16)
POTASSIUM SERPL-MCNC: 3.6 MMOL/L — SIGNIFICANT CHANGE UP (ref 3.5–5.3)
POTASSIUM SERPL-SCNC: 3.6 MMOL/L — SIGNIFICANT CHANGE UP (ref 3.5–5.3)
PROTHROM AB SERPL-ACNC: 31.1 SEC — HIGH (ref 10–13.1)
SODIUM SERPL-SCNC: 137 MMOL/L — SIGNIFICANT CHANGE UP (ref 135–145)

## 2018-05-04 PROCEDURE — 99232 SBSQ HOSP IP/OBS MODERATE 35: CPT

## 2018-05-04 RX ORDER — OXYCODONE HYDROCHLORIDE 5 MG/1
10 TABLET ORAL EVERY 12 HOURS
Qty: 0 | Refills: 0 | Status: DISCONTINUED | OUTPATIENT
Start: 2018-05-04 | End: 2018-05-08

## 2018-05-04 RX ORDER — SODIUM HYPOCHLORITE 0.125 %
1 SOLUTION, NON-ORAL MISCELLANEOUS THREE TIMES A DAY
Qty: 0 | Refills: 0 | Status: DISCONTINUED | OUTPATIENT
Start: 2018-05-04 | End: 2018-05-05

## 2018-05-04 RX ORDER — WARFARIN SODIUM 2.5 MG/1
1 TABLET ORAL ONCE
Qty: 0 | Refills: 0 | Status: COMPLETED | OUTPATIENT
Start: 2018-05-04 | End: 2018-05-04

## 2018-05-04 RX ADMIN — HYDROMORPHONE HYDROCHLORIDE 2 MILLIGRAM(S): 2 INJECTION INTRAMUSCULAR; INTRAVENOUS; SUBCUTANEOUS at 11:15

## 2018-05-04 RX ADMIN — DAPTOMYCIN 132 MILLIGRAM(S): 500 INJECTION, POWDER, LYOPHILIZED, FOR SOLUTION INTRAVENOUS at 08:34

## 2018-05-04 RX ADMIN — HYDROMORPHONE HYDROCHLORIDE 2 MILLIGRAM(S): 2 INJECTION INTRAMUSCULAR; INTRAVENOUS; SUBCUTANEOUS at 06:09

## 2018-05-04 RX ADMIN — Medication 500000 UNIT(S): at 17:08

## 2018-05-04 RX ADMIN — Medication 500000 UNIT(S): at 06:09

## 2018-05-04 RX ADMIN — HYDROMORPHONE HYDROCHLORIDE 2 MILLIGRAM(S): 2 INJECTION INTRAMUSCULAR; INTRAVENOUS; SUBCUTANEOUS at 22:55

## 2018-05-04 RX ADMIN — Medication 100 MILLIGRAM(S): at 06:09

## 2018-05-04 RX ADMIN — HYDROMORPHONE HYDROCHLORIDE 2 MILLIGRAM(S): 2 INJECTION INTRAMUSCULAR; INTRAVENOUS; SUBCUTANEOUS at 15:40

## 2018-05-04 RX ADMIN — TIOTROPIUM BROMIDE 1 CAPSULE(S): 18 CAPSULE ORAL; RESPIRATORY (INHALATION) at 11:39

## 2018-05-04 RX ADMIN — OXYCODONE HYDROCHLORIDE 10 MILLIGRAM(S): 5 TABLET ORAL at 17:08

## 2018-05-04 RX ADMIN — PANTOPRAZOLE SODIUM 40 MILLIGRAM(S): 20 TABLET, DELAYED RELEASE ORAL at 06:09

## 2018-05-04 RX ADMIN — Medication 500000 UNIT(S): at 10:20

## 2018-05-04 RX ADMIN — BUDESONIDE AND FORMOTEROL FUMARATE DIHYDRATE 2 PUFF(S): 160; 4.5 AEROSOL RESPIRATORY (INHALATION) at 17:08

## 2018-05-04 RX ADMIN — HYDROMORPHONE HYDROCHLORIDE 2 MILLIGRAM(S): 2 INJECTION INTRAMUSCULAR; INTRAVENOUS; SUBCUTANEOUS at 06:39

## 2018-05-04 RX ADMIN — Medication 1 MILLIGRAM(S): at 11:35

## 2018-05-04 RX ADMIN — Medication 1 APPLICATION(S): at 23:45

## 2018-05-04 RX ADMIN — HYDROMORPHONE HYDROCHLORIDE 2 MILLIGRAM(S): 2 INJECTION INTRAMUSCULAR; INTRAVENOUS; SUBCUTANEOUS at 10:20

## 2018-05-04 RX ADMIN — BUDESONIDE AND FORMOTEROL FUMARATE DIHYDRATE 2 PUFF(S): 160; 4.5 AEROSOL RESPIRATORY (INHALATION) at 06:09

## 2018-05-04 RX ADMIN — Medication 100 MILLIGRAM(S): at 14:44

## 2018-05-04 RX ADMIN — POLYETHYLENE GLYCOL 3350 17 GRAM(S): 17 POWDER, FOR SOLUTION ORAL at 11:35

## 2018-05-04 RX ADMIN — HYDROMORPHONE HYDROCHLORIDE 2 MILLIGRAM(S): 2 INJECTION INTRAMUSCULAR; INTRAVENOUS; SUBCUTANEOUS at 23:40

## 2018-05-04 RX ADMIN — Medication 325 MILLIGRAM(S): at 11:35

## 2018-05-04 RX ADMIN — Medication 1 TABLET(S): at 06:09

## 2018-05-04 RX ADMIN — Medication 500 MILLIGRAM(S): at 10:20

## 2018-05-04 RX ADMIN — Medication 1 TABLET(S): at 11:34

## 2018-05-04 RX ADMIN — HYDROMORPHONE HYDROCHLORIDE 2 MILLIGRAM(S): 2 INJECTION INTRAMUSCULAR; INTRAVENOUS; SUBCUTANEOUS at 14:44

## 2018-05-04 RX ADMIN — Medication 81 MILLIGRAM(S): at 10:20

## 2018-05-04 RX ADMIN — ZINC OXIDE 1 APPLICATION(S): 200 OINTMENT TOPICAL at 11:39

## 2018-05-04 RX ADMIN — Medication 1 TABLET(S): at 14:44

## 2018-05-04 NOTE — PROGRESS NOTE ADULT - PROBLEM SELECTOR PLAN 4
multifactorial-leg and lung--on ABX as per ID (daptomycin)  need to reconsider other sources of infection-?endocarditis-if fevers persist/recurs multifactorial-leg and lung--on ABX as per ID (daptomycin)--next minocycline  need to reconsider other sources of infection-?endocarditis-if fevers persist/recurs

## 2018-05-04 NOTE — PROGRESS NOTE ADULT - SUBJECTIVE AND OBJECTIVE BOX
Patient is a 77y old  Female who presents with a chief complaint of fever (11 Apr 2018 14:25)      SUBJECTIVE / OVERNIGHT EVENTS:    Patient seen and examined. co vibration from bed causing pain in right leg. good appetite. states she is unable to get out of bed 2/2 pain but will try later.      Vital Signs Last 24 Hrs  T(C): 36.6 (04 May 2018 06:07), Max: 36.8 (03 May 2018 21:28)  T(F): 97.8 (04 May 2018 06:07), Max: 98.2 (03 May 2018 21:28)  HR: 84 (04 May 2018 06:07) (77 - 84)  BP: 146/69 (04 May 2018 06:07) (122/72 - 146/69)  BP(mean): --  RR: 18 (04 May 2018 06:07) (18 - 18)  SpO2: 95% (04 May 2018 06:07) (94% - 96%)  I&O's Summary    03 May 2018 07:01  -  04 May 2018 07:00  --------------------------------------------------------  IN: 1250 mL / OUT: 500 mL / NET: 750 mL        PE:  GENERAL: NAD, AAOx3, obese  HEAD:  Atraumatic, Normocephalic  EYES: EOMI, PERRLA, conjunctiva and sclera clear  NECK: Supple, No JVD  CHEST/LUNG: CTABL, No wheeze  HEART: Regular rate and rhythm; no murmur  ABDOMEN: Soft, Nontender, Nondistended; Bowel sounds present, lower abd wound  EXTREMITIES:  2+ Peripheral Pulses, right knee eschar, R upper thigh necrosis  SKIN: No rashes or lesions  NEURO: No focal deficits    LABS:                        8.4    9.71  )-----------( 318      ( 03 May 2018 08:01 )             26.4     05-04    137  |  97  |  8   ----------------------------<  104<H>  3.6   |  29  |  0.61    Ca    8.6      04 May 2018 06:37      PT/INR - ( 04 May 2018 07:29 )   PT: 31.1 sec;   INR: 2.70 ratio           CAPILLARY BLOOD GLUCOSE                RADIOLOGY & ADDITIONAL TESTS:    Imaging Personally Reviewed:  [x] YES  [ ] NO    Consultant(s) Notes Reviewed:  [x] YES  [ ] NO    MEDICATIONS  (STANDING):  ALPRAZolam 0.25 milliGRAM(s) Oral once  ascorbic acid 500 milliGRAM(s) Oral daily  aspirin enteric coated 81 milliGRAM(s) Oral daily  buDESOnide 160 MICROgram(s)/formoterol 4.5 MICROgram(s) Inhaler 2 Puff(s) Inhalation two times a day  calcium carbonate 1250 mG + Vitamin D (OsCal 500 + D) 1 Tablet(s) Oral three times a day  DAPTOmycin IVPB 800 milliGRAM(s) IV Intermittent every 24 hours  docusate sodium 100 milliGRAM(s) Oral three times a day  ferrous    sulfate 325 milliGRAM(s) Oral daily  folic acid 1 milliGRAM(s) Oral daily  melatonin 3 milliGRAM(s) Oral at bedtime  multivitamin 1 Tablet(s) Oral daily  nystatin    Suspension 203675 Unit(s) Oral every 6 hours  pantoprazole    Tablet 40 milliGRAM(s) Oral before breakfast  polyethylene glycol 3350 17 Gram(s) Oral daily  povidone iodine 10% Solution 1 Application(s) Topical three times a day  sodium chloride 0.9%. 1000 milliLiter(s) (75 mL/Hr) IV Continuous <Continuous>  tiotropium 18 MICROgram(s) Capsule 1 Capsule(s) Inhalation daily  warfarin 1 milliGRAM(s) Oral once  zinc oxide 20% Ointment 1 Application(s) Topical daily    MEDICATIONS  (PRN):  acetaminophen   Tablet 650 milliGRAM(s) Oral every 6 hours PRN For Temp greater than 38 C (100.4 F)  acetaminophen   Tablet. 650 milliGRAM(s) Oral every 6 hours PRN Mild Pain (1 - 3)  HYDROmorphone   Tablet 2 milliGRAM(s) Oral every 4 hours PRN Severe Pain (7 - 10)  lidocaine 2% Viscous 10 milliLiter(s) Swish and Spit every 6 hours PRN Mouth Sores  senna 2 Tablet(s) Oral at bedtime PRN Constipation      Care Discussed with Consultants/Other Providers [x] YES  [ ] NO    HEALTH ISSUES - PROBLEM Dx:  Adjustment disorder, unspecified type  Delirium due to another medical condition  Pneumonia: Pneumonia  Aortic stenosis, severe: Aortic stenosis, severe  MYAH (obstructive sleep apnea): MYAH (obstructive sleep apnea)  Obesity: Obesity  Asthma: Asthma  SOB (shortness of breath): SOB (shortness of breath)  Chronic deep vein thrombosis (DVT) of lower extremity, unspecified laterality, unspecified vein: Chronic deep vein thrombosis (DVT) of lower extremity, unspecified laterality, unspecified vein  Arthralgia of knee, unspecified laterality: Arthralgia of knee, unspecified laterality  Hyperlipidemia, unspecified hyperlipidemia type: Hyperlipidemia, unspecified hyperlipidemia type  Asthma, unspecified asthma severity, unspecified whether complicated, unspecified whether persistent: Asthma, unspecified asthma severity, unspecified whether complicated, unspecified whether persistent  Coronary artery disease involving native coronary artery of native heart without angina pectoris: Coronary artery disease involving native coronary artery of native heart without angina pectoris  Gastroesophageal reflux disease, esophagitis presence not specified: Gastroesophageal reflux disease, esophagitis presence not specified  Fever, unspecified fever cause: Fever, unspecified fever cause

## 2018-05-04 NOTE — PROGRESS NOTE ADULT - SUBJECTIVE AND OBJECTIVE BOX
Pt seen and examined. Doing well. No acute events o/n.     T(C): 36.6 (05-04-18 @ 06:07), Max: 36.8 (05-03-18 @ 21:28)  T(F): 97.8 (05-04-18 @ 06:07), Max: 98.2 (05-03-18 @ 21:28)  HR: 84 (05-04-18 @ 06:07) (77 - 84)  BP: 146/69 (05-04-18 @ 06:07) (122/72 - 146/69)  RR: 18 (05-04-18 @ 06:07) (18 - 18)  SpO2: 95% (05-04-18 @ 06:07) (94% - 96%)      03 May 2018 07:01  -  04 May 2018 07:00  --------------------------------------------------------  IN:    IV PiggyBack: 50 mL    Oral Fluid: 960 mL  Total IN: 1010 mL    OUT:    Voided: 500 mL  Total OUT: 500 mL    Total NET: 510 mL          Exam:  R knee with stable open medial wound, improving in appearance  R proximal thigh wound appears , some areas of necrotic-appearing tissue  R abdominal wound appears  with fibrinous tissue at base                            8.4    9.71  )-----------( 318      ( 03 May 2018 08:01 )             26.4             Plan:

## 2018-05-04 NOTE — PROGRESS NOTE ADULT - SUBJECTIVE AND OBJECTIVE BOX
CHIEF COMPLAINT:    Interval Events:    REVIEW OF SYSTEMS:  Constitutional: No fevers or chills. No weight loss. No fatigue or generalized malaise.  Eyes: No itching or discharge from the eyes  ENT: No ear pain. No ear discharge. No nasal congestion. No post nasal drip. No epistaxis. No throat pain. No sore throat. No difficulty swallowing.   CV: No chest pain. No palpitations. No lightheadedness or dizziness.   Resp: No dyspnea at rest. No dyspnea on exertion. No orthopnea. No wheezing. No cough. No stridor. No sputum production. No chest pain with respiration.  GI: No nausea. No vomiting. No diarrhea.  MSK: No joint pain or pain in any extremities  Integumentary: No skin lesions. No pedal edema.  Neurological: No gross motor weakness. No sensory changes.  [ ] All other systems negative  [ ] Unable to assess ROS because ________    OBJECTIVE:  ICU Vital Signs Last 24 Hrs  T(C): 36.5 (04 May 2018 00:50), Max: 36.9 (03 May 2018 06:10)  T(F): 97.7 (04 May 2018 00:50), Max: 98.4 (03 May 2018 06:10)  HR: 83 (04 May 2018 00:50) (77 - 83)  BP: 134/83 (04 May 2018 00:50) (122/72 - 143/71)  BP(mean): --  ABP: --  ABP(mean): --  RR: 18 (04 May 2018 00:50) (18 - 18)  SpO2: 95% (04 May 2018 00:50) (94% - 96%)        05-02 @ 07:01 - 05-03 @ 07:00  --------------------------------------------------------  IN: 820 mL / OUT: 350 mL / NET: 470 mL    05-03 @ 07:01  -  05-04 @ 05:43  --------------------------------------------------------  IN: 1010 mL / OUT: 500 mL / NET: 510 mL      CAPILLARY BLOOD GLUCOSE          PHYSICAL EXAM:  General: Awake, alert, oriented X 3.   HEENT: Atraumatic, normocephalic.                 Mallampatti Grade                 No nasal congestion.                No tonsillar or pharyngeal exudates.  Lymph Nodes: No palpable lymphadenopathy  Neck: No JVD. No carotid bruit.   Respiratory: Normal chest expansion                         Normal percussion                         Normal and equal air entry                         No wheeze, rhonchi or rales.  Cardiovascular: S1 S2 normal. No murmurs, rubs or gallops.   Abdomen: Soft, non-tender, non-distended. No organomegaly.  Extremities: Warm to touch. Peripheral pulse palpable. No pedal edema.   Skin: No rashes or skin lesions  Neurological: Motor and sensory examination equal and normal in all four extremities.  Psychiatry: Appropriate mood and affect.    HOSPITAL MEDICATIONS:  MEDICATIONS  (STANDING):  ALPRAZolam 0.25 milliGRAM(s) Oral once  ascorbic acid 500 milliGRAM(s) Oral daily  aspirin enteric coated 81 milliGRAM(s) Oral daily  buDESOnide 160 MICROgram(s)/formoterol 4.5 MICROgram(s) Inhaler 2 Puff(s) Inhalation two times a day  calcium carbonate 1250 mG + Vitamin D (OsCal 500 + D) 1 Tablet(s) Oral three times a day  DAPTOmycin IVPB 800 milliGRAM(s) IV Intermittent every 24 hours  docusate sodium 100 milliGRAM(s) Oral three times a day  ferrous    sulfate 325 milliGRAM(s) Oral daily  folic acid 1 milliGRAM(s) Oral daily  melatonin 3 milliGRAM(s) Oral at bedtime  multivitamin 1 Tablet(s) Oral daily  nystatin    Suspension 845931 Unit(s) Oral every 6 hours  pantoprazole    Tablet 40 milliGRAM(s) Oral before breakfast  polyethylene glycol 3350 17 Gram(s) Oral daily  povidone iodine 10% Solution 1 Application(s) Topical three times a day  sodium chloride 0.9%. 1000 milliLiter(s) (75 mL/Hr) IV Continuous <Continuous>  tiotropium 18 MICROgram(s) Capsule 1 Capsule(s) Inhalation daily  zinc oxide 20% Ointment 1 Application(s) Topical daily    MEDICATIONS  (PRN):  acetaminophen   Tablet 650 milliGRAM(s) Oral every 6 hours PRN For Temp greater than 38 C (100.4 F)  acetaminophen   Tablet. 650 milliGRAM(s) Oral every 6 hours PRN Mild Pain (1 - 3)  HYDROmorphone   Tablet 2 milliGRAM(s) Oral every 4 hours PRN Severe Pain (7 - 10)  lidocaine 2% Viscous 10 milliLiter(s) Swish and Spit every 6 hours PRN Mouth Sores  senna 2 Tablet(s) Oral at bedtime PRN Constipation      LABS:                        8.4    9.71  )-----------( 318      ( 03 May 2018 08:01 )             26.4           PT/INR - ( 03 May 2018 09:43 )   PT: 27.6 sec;   INR: 2.40 ratio                   MICROBIOLOGY:     RADIOLOGY:  [ ] Reviewed and interpreted by me    Point of Care Ultrasound Findings:    PFT:    EKG: CHIEF COMPLAINT: depressed--some pain in leg    Interval Events: completed abx    REVIEW OF SYSTEMS:  Constitutional: No fevers or chills. No weight loss. + fatigue or generalized malaise.  Eyes: No itching or discharge from the eyes  ENT: No ear pain. No ear discharge. No nasal congestion. No post nasal drip. No epistaxis. No throat pain. No sore throat. No difficulty swallowing.   CV: No chest pain. No palpitations. No lightheadedness or dizziness.   Resp: No dyspnea at rest. + dyspnea on exertion. No orthopnea. No wheezing. No cough. No stridor. No sputum production. No chest pain with respiration.  GI: No nausea. No vomiting. No diarrhea.  MSK: No joint pain or pain in any extremities--except RLE  Integumentary: No skin lesions. No pedal edema.  Neurological: No gross motor weakness. No sensory changes.  [+ ] All other systems negative  [ ] Unable to assess ROS because ________    OBJECTIVE:  ICU Vital Signs Last 24 Hrs  T(C): 36.5 (04 May 2018 00:50), Max: 36.9 (03 May 2018 06:10)  T(F): 97.7 (04 May 2018 00:50), Max: 98.4 (03 May 2018 06:10)  HR: 83 (04 May 2018 00:50) (77 - 83)  BP: 134/83 (04 May 2018 00:50) (122/72 - 143/71)  BP(mean): --  ABP: --  ABP(mean): --  RR: 18 (04 May 2018 00:50) (18 - 18)  SpO2: 95% (04 May 2018 00:50) (94% - 96%)        05-02 @ 07:01  -  05-03 @ 07:00  --------------------------------------------------------  IN: 820 mL / OUT: 350 mL / NET: 470 mL    05-03 @ 07:01  -  05-04 @ 05:43  --------------------------------------------------------  IN: 1010 mL / OUT: 500 mL / NET: 510 mL      CAPILLARY BLOOD GLUCOSE          PHYSICAL EXAM: NAD   General: Awake, alert, oriented X 3.   HEENT: Atraumatic, normocephalic.                 Mallampatti Grade 3                No nasal congestion.                No tonsillar or pharyngeal exudates.  Lymph Nodes: No palpable lymphadenopathy  Neck: No JVD. No carotid bruit.   Respiratory: abnormal chest expansion--reduced BS bases                         Normal percussion                         Normal and equal air entry                         No wheeze, rhonchi or rales.  Cardiovascular: S1 S2 normal. No murmurs, rubs or gallops.   Abdomen: Soft, non-tender, non-distended. No organomegaly.  Extremities: Warm to touch. Peripheral pulse palpable. +RLE wound  Skin: No rashes or skin lesions  Neurological: Motor and sensory examination equal and normal in all four extremities-exceot RLE  Psychiatry: Appropriate mood and affect.    HOSPITAL MEDICATIONS:  MEDICATIONS  (STANDING):  ALPRAZolam 0.25 milliGRAM(s) Oral once  ascorbic acid 500 milliGRAM(s) Oral daily  aspirin enteric coated 81 milliGRAM(s) Oral daily  buDESOnide 160 MICROgram(s)/formoterol 4.5 MICROgram(s) Inhaler 2 Puff(s) Inhalation two times a day  calcium carbonate 1250 mG + Vitamin D (OsCal 500 + D) 1 Tablet(s) Oral three times a day  DAPTOmycin IVPB 800 milliGRAM(s) IV Intermittent every 24 hours  docusate sodium 100 milliGRAM(s) Oral three times a day  ferrous    sulfate 325 milliGRAM(s) Oral daily  folic acid 1 milliGRAM(s) Oral daily  melatonin 3 milliGRAM(s) Oral at bedtime  multivitamin 1 Tablet(s) Oral daily  nystatin    Suspension 390636 Unit(s) Oral every 6 hours  pantoprazole    Tablet 40 milliGRAM(s) Oral before breakfast  polyethylene glycol 3350 17 Gram(s) Oral daily  povidone iodine 10% Solution 1 Application(s) Topical three times a day  sodium chloride 0.9%. 1000 milliLiter(s) (75 mL/Hr) IV Continuous <Continuous>  tiotropium 18 MICROgram(s) Capsule 1 Capsule(s) Inhalation daily  zinc oxide 20% Ointment 1 Application(s) Topical daily    MEDICATIONS  (PRN):  acetaminophen   Tablet 650 milliGRAM(s) Oral every 6 hours PRN For Temp greater than 38 C (100.4 F)  acetaminophen   Tablet. 650 milliGRAM(s) Oral every 6 hours PRN Mild Pain (1 - 3)  HYDROmorphone   Tablet 2 milliGRAM(s) Oral every 4 hours PRN Severe Pain (7 - 10)  lidocaine 2% Viscous 10 milliLiter(s) Swish and Spit every 6 hours PRN Mouth Sores  senna 2 Tablet(s) Oral at bedtime PRN Constipation      LABS:                        8.4    9.71  )-----------( 318      ( 03 May 2018 08:01 )             26.4           PT/INR - ( 03 May 2018 09:43 )   PT: 27.6 sec;   INR: 2.40 ratio                   MICROBIOLOGY:     RADIOLOGY:  [ ] Reviewed and interpreted by me    Point of Care Ultrasound Findings:    PFT:    EKG:

## 2018-05-04 NOTE — PROGRESS NOTE ADULT - ASSESSMENT
Patient with infected right tkr s/p rudy, spacer for strept infection, had poor wound healing so returned 3 months after and had spacer exchange and complex wound closure with mrsa infection found. She is about 2 weeks into dapto and returns with fever, leukocytosis, ongoing pain and eschar of right knee wound and what appears to be a hematoma of the right thigh. INR has been up so that is good reason for the thigh findings. Must consider infected hematoma possible. UTI is possible. Pneumonia is possible given basilar rales. retroperitoneal hematoma possible .  PMR decompensation or adrenal insufficiency a consideration at well.    Pulmonary stable relative to prior admits --  wound care in progress  Completing ABX-5/4 Patient with infected right tkr s/p rudy, spacer for strept infection, had poor wound healing so returned 3 months after and had spacer exchange and complex wound closure with mrsa infection found. She is about 2 weeks into dapto and returns with fever, leukocytosis, ongoing pain and eschar of right knee wound and what appears to be a hematoma of the right thigh. INR has been up so that is good reason for the thigh findings. Must consider infected hematoma possible. UTI is possible. Pneumonia is possible given basilar rales. retroperitoneal hematoma possible .  PMR decompensation or adrenal insufficiency a consideration at well.    Pulmonary stable relative to prior admits --  wound care in progress  Completed IV ABX-5/4

## 2018-05-04 NOTE — PROGRESS NOTE ADULT - ASSESSMENT
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    Abdominal wound - d/w plastic change packing w/ dakins 2/2 foul smelling drainage- pt refusing further debridement  Rt thigh wound - consider medihoney  Rt Knee wound- as per plastics-  Consider Palliative for Pain management  Consider Psych or SW= pt is upset about her situation  con't Nutrition (as tolerated)  con't Offloading   con't Pericare  Care as per medicine will follow w/ you  Upon discharge f/u as outpatient at [facility] [address] [facility] [phone]  D/w team & attng  Janki Cramer PA-C CWS [id number] A/P: 76yo f PMH including HLD, GERD, Anxiety, Anemia, CAD s/p HERBERT, severe AS (s/p TAVR & PPM 12/17 [place] [employer] Model [id number]), 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    Abdominal wound - d/w plastic change packing w/ dakins 2/2 foul smelling drainage- pt refusing further debridement  Rt thigh wound - consider medihoney  Rt Knee wound- as per plastics-  Consider Palliative for Pain management  Consider Psych or SW= pt is upset about her situation  con't Nutrition (as tolerated)  con't Offloading   con't Pericare  Care as per medicine will follow w/ you  Upon discharge f/u as outpatient at [facility]ound [facility] [phone]  D/w team & attng  Janki Cramer PA-C CWS [id number]  I spent  25 minutes w/ this pt of which more than 50% of the time was spent counseling & coordinating care of this pt.

## 2018-05-04 NOTE — PROGRESS NOTE ADULT - SUBJECTIVE AND OBJECTIVE BOX
Pt is a 77 year old female who was admitted several weeks ago with aspiration pneumonia and fever with right knee pain. She also had proximal thigh swelling and there was a hematoma of the leg as well. She has a history of PMR on steroids. She also has a history of tavr, AICD andf remote bilateral TKR and was found to have strep bacteremia and a ERIKA was negative and she also had a eschar of the right knee as well. She was placed on iv antibiotics and I understand that she will be completing the antibiotics, daptomycin on this date. She told me that she had anemia in the distant past and was placed on antibiotics. She is being seen by the plastics service as well. A ct of th femur was not remarkable for infection and a ct of the chest done several weeks ago showed bibasilar PNA. She was also noted to have a left renal lesion that is unchanged now since 12/2016.     The consult was requested to see if there is anything we can do for her anemia. The counts on the day of this admission was white cell count of 9.71 hemoglobin 8.4 hematocrit 26.4 MCV 86 and MCHC 31.8 and platelet count of 173818 the diff count was 74 polys 15 lymphs and 7 monos the bun was 9 and the creatinine was 0.6     5/4 the pt is stable and the iron studies are still pending at this time. The hemoglobin was 8.4.  PE weak appearing with female and needed help to do normal daily activities large eschar as described on the lower leg.  pmh as above in the body of the report  FH/SH no  contrib  MEDICATIONS  (STANDING):  ALPRAZolam 0.25 milliGRAM(s) Oral once  ascorbic acid 500 milliGRAM(s) Oral daily  aspirin enteric coated 81 milliGRAM(s) Oral daily  buDESOnide 160 MICROgram(s)/formoterol 4.5 MICROgram(s) Inhaler 2 Puff(s) Inhalation two times a day  calcium carbonate 1250 mG + Vitamin D (OsCal 500 + D) 1 Tablet(s) Oral three times a day  DAPTOmycin IVPB 800 milliGRAM(s) IV Intermittent every 24 hours  docusate sodium 100 milliGRAM(s) Oral three times a day  ferrous    sulfate 325 milliGRAM(s) Oral daily  folic acid 1 milliGRAM(s) Oral daily  melatonin 3 milliGRAM(s) Oral at bedtime  multivitamin 1 Tablet(s) Oral daily  nystatin    Suspension 628357 Unit(s) Oral every 6 hours  pantoprazole    Tablet 40 milliGRAM(s) Oral before breakfast  polyethylene glycol 3350 17 Gram(s) Oral daily  povidone iodine 10% Solution 1 Application(s) Topical three times a day  sodium chloride 0.9%. 1000 milliLiter(s) (75 mL/Hr) IV Continuous <Continuous>  tiotropium 18 MICROgram(s) Capsule 1 Capsule(s) Inhalation daily  warfarin 1.5 milliGRAM(s) Oral once  zinc oxide 20% Ointment 1 Application(s) Topical daily  Vital Signs Last 24 Hrs  T(C): 36.9 (03 May 2018 06:10), Max: 37.1 (03 May 2018 01:07)  T(F): 98.4 (03 May 2018 06:10), Max: 98.7 (03 May 2018 01:07)  HR: 81 (03 May 2018 06:10) (79 - 83)  BP: 137/67 (03 May 2018 06:10) (136/80 - 143/74)  BP(mean): --  RR: 18 (03 May 2018 06:10) (18 - 20)  SpO2: 94% (03 May 2018 06:10) (94% - 96%)                        8.4    9.71  )-----------( 318      ( 03 May 2018 08:01 )             26.4   bun 9 creatinine 0.66

## 2018-05-04 NOTE — PROGRESS NOTE ADULT - ATTENDING COMMENTS
as above-  Pulm relatively stable-atelectasis, prior PE, asthma, cardiac Dz  Inhaler regimen as outlined above  DVT rx /prophylaxis  ID follow up of ABX  (IV for 42 days total then PO minocycline)-to complete 5/4--then minocycline   PT evaluation and Psych evaluation    ortho/plastics follow up--?AKA-if conservative rx-not successful  Rehab pending    Everett Kumar MD-Pulmonary   [phone] as above-  Pulm relatively stable-atelectasis, prior PE, asthma, cardiac Dz  Inhaler regimen as outlined above  DVT rx /prophylaxis  ID follow up of ABX  completed 5/4--then minocycline   PT evaluation and Psych evaluation    ortho/plastics follow up--?AKA-if conservative rx-not successful  Rehab pending    Everett Kumar MD-Pulmonary   [phone]

## 2018-05-04 NOTE — PROGRESS NOTE ADULT - ASSESSMENT
no plan for orthopedic surgical intervention per family and patient preference  local wound care per plastics and wound care team  continue IV antibiotic treatment per infectious disease protocol  encourage patient to spend majority of bed oob in bedside chair as able  PT to help mobilize, she must remain 50% wb on the RLE and NO KNEE MOTION allowed, knee immobilizer if oob  coumadin, inr goal 2-3  continue to optimize nutrition  continue to involve psych	  appreciate hematology consult for chronic anemia, despite chronic anemia which she is tolerating, would prefer hb>12 if possible to improve oxygen supply for wound healing    needs clear plan delineated by prs/wound care teams for abdominal/thigh/knee wounds. SNF transfer is on hold at the moment until further progress is made with wound care per PRS/wound care.    Nate Bass MD  Attending Orthopedic Surgeon

## 2018-05-04 NOTE — PROGRESS NOTE ADULT - SUBJECTIVE AND OBJECTIVE BOX
SUBJECTIVE: Pt seen, chart reviewed.  Pain rx changed but pt w/continued pain  Pt often feels sad about situation  Daughter at bedside- all questions asked and answered to pt & daughter's satisfaction  (+)pain  (+)drianage  (+) odor from abdominal wound  no redness, warmth, f/c/s noted      ROS skin & MSK see HPI  All other systems negative    aspirin enteric coated 81 milliGRAM(s) Oral daily  DAPTOmycin IVPB 800 milliGRAM(s) IV Intermittent every 24 hours  nystatin    Suspension 333349 Unit(s) Oral every 6 hours  warfarin 1 milliGRAM(s) Oral once      Physical Exam:  Vital Signs Last 24 Hrs  T(C): 36.6 (04 May 2018 06:07), Max: 36.8 (03 May 2018 21:28)  T(F): 97.8 (04 May 2018 06:07), Max: 98.2 (03 May 2018 21:28)  HR: 84 (04 May 2018 06:07) (77 - 84)  BP: 146/69 (04 May 2018 06:07) (122/72 - 146/69)  BP(mean): --  RR: 18 (04 May 2018 06:07) (18 - 18)  SpO2: 95% (04 May 2018 06:07) (95% - 95%)  General Appearance:  NAD, A&Ox3, MO  ENvella bed    Skin:  frail,  ecchymosis w/o hematoma  Abdominal wall wound   w/ purulent like drainage (+)tender (+)odor  No erythema, increased warmth, induration, fluctuance    Rt upper thigh w/ irregular shaped   more granular, w/ soft eschar  (+)tender  (+) serosanguinous drainage  No odor, erythema, increased warmth, tenderness, induration, fluctuance          LABS:                        8.4    9.71  )-----------( 318      ( 03 May 2018 08:01 )             26.4     PT/INR - ( 04 May 2018 07:29 )   PT: 31.1 sec;   INR: 2.70 ratio SUBJECTIVE: Pt seen, chart reviewed.  requested to reeval by plastics   Pain rx changed but pt w/continued pain  Pt often feels sad about situation  Daughter at bedside- all questions asked and answered to pt & daughter's satisfaction  (+)pain  (+)drianage  (+) odor from abdominal wound  no redness, warmth, f/c/s noted      ROS skin & MSK see HPI  All other systems negative    aspirin enteric coated 81 milliGRAM(s) Oral daily  DAPTOmycin IVPB 800 milliGRAM(s) IV Intermittent every 24 hours  nystatin    Suspension 647644 Unit(s) Oral every 6 hours  warfarin 1 milliGRAM(s) Oral once      Physical Exam:  Vital Signs Last 24 Hrs  T(C): 36.6 (04 May 2018 06:07), Max: 36.8 (03 May 2018 21:28)  T(F): 97.8 (04 May 2018 06:07), Max: 98.2 (03 May 2018 21:28)  HR: 84 (04 May 2018 06:07) (77 - 84)  BP: 146/69 (04 May 2018 06:07) (122/72 - 146/69)  BP(mean): --  RR: 18 (04 May 2018 06:07) (18 - 18)  SpO2: 95% (04 May 2018 06:07) (95% - 95%)  General Appearance:  NAD, A&Ox3, MO  ENvella bed    Skin:  frail,  ecchymosis w/o hematoma  Abdominal wall wound   w/ purulent like drainage (+)tender (+)odor  No erythema, increased warmth, induration, fluctuance    Rt upper thigh w/ irregular shaped   more granular, w/ soft eschar  (+)tender  (+) serosanguinous drainage  No odor, erythema, increased warmth, tenderness, induration, fluctuance          LABS:                        8.4    9.71  )-----------( 318      ( 03 May 2018 08:01 )             26.4     PT/INR - ( 04 May 2018 07:29 )   PT: 31.1 sec;   INR: 2.70 ratio

## 2018-05-04 NOTE — PROGRESS NOTE ADULT - SUBJECTIVE AND OBJECTIVE BOX
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    Patient reports feeling OK     REVIEW OF SYSTEMS:  All other review of systems negative (Comprehensive ROS)    Antimicrobials Day #  :41/42  DAPTOmycin IVPB 800 milliGRAM(s) IV Intermittent every 24 hours  nystatin    Suspension 108869 Unit(s) Oral every 6 hours    Other Medications Reviewed    Vital Signs Last 24 Hrs  T(C): 36.7 (04 May 2018 16:23), Max: 36.8 (03 May 2018 21:28)  T(F): 98.1 (04 May 2018 16:23), Max: 98.2 (03 May 2018 21:28)  HR: 85 (04 May 2018 16:23) (77 - 85)  BP: 114/67 (04 May 2018 16:23) (114/67 - 146/69)  BP(mean): --  RR: 18 (04 May 2018 16:23) (18 - 18)  SpO2: 95% (04 May 2018 16:23) (95% - 95%)  PHYSICAL EXAM:  General: alert, no acute distress  Eyes:  anicteric, no conjunctival injection, no discharge  Oropharynx: no lesions or injection 	  Neck: supple, without adenopathy  Lungs: clear anterior to auscultation  Heart: regular rate and rhythm; no murmur, rubs or gallops  Abdomen: soft, nondistended, nontender, without mass or organomegaly  Skin: no lesions  Extremities right leg dressings in place, no surrounding cellulitis  Neurologic: alert, oriented, moves all extremities    LAB RESULTS:                            8.4    9.71  )-----------( 318      ( 03 May 2018 08:01 )             26.4                       8.4    9.71  )-----------( 318      ( 03 May 2018 08:01 )             26.4               MICROBIOLOGY:  RECENT CULTURES:      RADIOLOGY REVIEWED:    < from: CT Abdomen and Pelvis No Cont (04.19.18 @ 19:06) >  IMPRESSION:   Slight interval improvement in bibasilar pneumonia.  Small amount of lenticular-shaped fluid between the right and the left   hepatic lobes.  Complex left renal lesion not well characterized on this exam but   unchanged since 12/6/2016. Continued follow-up is advised.      < from: CT Femur No Cont, Right (04.19.18 @ 19:06) >  IMPRESSION:  1.  Patient status post explantation of right total knee arthroplasty and   washout. Antibiotic cement and intramedullary ruthann are identified in the   right knee joint. No evidence of acute hardware complication.  2.  No focal, drainable fluid collections are identified on today's   examination.  3.  Nonspecific subcutaneous edema involving the patient's pannus and   subcutaneous fat of the medial thigh.      < end of copied text >

## 2018-05-04 NOTE — PROGRESS NOTE ADULT - PROBLEM SELECTOR PLAN 9
abnormal CT--c/w aspiration  Sp and Sw evaluation--aspiration precautions--ABX as per ID-total of 7 d overlap-completed -now on daptomycin alone-as per ID until 5/4 abnormal CT--c/w aspiration  Sp and Sw evaluation--aspiration precautions--ABX as per ID-total of 7 d overlap-completed -s/p daptomycin--now minocycline

## 2018-05-04 NOTE — PROGRESS NOTE ADULT - SUBJECTIVE AND OBJECTIVE BOX
pain controlled, no new events, afebrile    RLE  thigh with skin and fat necrosis, stable, further demarkating, no surrounding erythema, dressed by prs/wound care  surgical wound intact with stable eschar, some separation of eschar medially from skin, no exudate, dressed by prs/wound care  5/5 ta/ehl/gcs  silt l4-s1  2+ dp pulse

## 2018-05-04 NOTE — PROGRESS NOTE ADULT - ASSESSMENT
Assessment:  Patient with infected right tkr with strept several months ago s/p rudy, spacer then poor wound healing so had rudy and new spacer with mrsa grown so on dapto finishing 6 weeks. Still poor wound healing over knee followed by plastics. Had hematoma of thigh with skin breakdown and fat necrosis with same on abdomen being treated with local care. s/p tx for pneumonia for which she returned from rehab  reviewed white cell count wnl  no fevers  Plan: 1 more days of dapto then suppressive minocin  local care to wounds per plastics and wound care team

## 2018-05-04 NOTE — PROGRESS NOTE ADULT - ASSESSMENT
77 year old female who was admitted several weeks ago with aspiration pneumonia and fever with right knee pain. She also had proximal thigh swelling and there was a hematoma of the leg as well. She has a history of PMR on steroids. She also has a history of tavr, AICD andf remote bilateral TKR and was found to have strep bacteremia and a ERIKA was negative and she also had a eschar of the right knee as well. She was placed on iv antibiotics and I understand that she will be completing the antibiotics, daptomycin on this date. She told me that she had anemia in the distant past and was placed on antibiotics. She is being seen by the plastics service as well. A ct of th femur was not remarkable for infection and a ct of the chest done several weeks ago showed bibasilar PNA. She was also noted to have a left renal lesion that is unchanged now since 12/2016.     The consult was requested to see if there is anything we can do for her anemia. The counts on the day of this admission was white cell count of 9.71 hemoglobin 8.4 hematocrit 26.4 MCV 86 and MCHC 31.8 and platelet count of 137357 the diff count was 74 polys 15 lymphs and 7 monos the bun was 9 and the creatinine was 0.6     THe anemia as described is likely from anemia of inflammation. I would send off serum iron tibc and retic count and ferritin with a guaiac. She may be a candidate for epo to reduce transfusion needs, will follow.     Would send off iron studies, serum iron tibc and ferritin

## 2018-05-04 NOTE — PROGRESS NOTE ADULT - ASSESSMENT
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    # Fever, resolved  # Recent septic Rt TKR - abx spacer exchanged and plastics complex wound closure  # Aspiration PNA, resolved  # Supratherapeutic INR/coagulopathy, resolved  # PMR on chronic steroids  # thigh hematoma, fat necrosis  # poor healing right leg wound    Plan:  remains afebrile, cont Dapto until 5/5, then suppressive minocycline, BC NTD, appreciate ID recs  appreciate heme recs  appreciate plastics surgery recs, local wound care, wound care following  conservative therapey as family declining AKA, to stabilize wounds prior to rehab  appreciate ortho recs  will cont coumadin and asa  pain control, titrate to PO  PT OOB encourage ambulation  bowel regimen    plan of care dw patient and daughter at bedside

## 2018-05-05 LAB
FERRITIN SERPL-MCNC: 406 NG/ML — HIGH (ref 15–150)
INR BLD: 3.07 RATIO — HIGH (ref 0.88–1.16)
IRON SATN MFR SERPL: 14 % — SIGNIFICANT CHANGE UP (ref 14–50)
IRON SATN MFR SERPL: 16 UG/DL — LOW (ref 30–160)
PROTHROM AB SERPL-ACNC: 35.5 SEC — HIGH (ref 10–13.1)
TIBC SERPL-MCNC: 115 UG/DL — LOW (ref 220–430)
UIBC SERPL-MCNC: 99 UG/DL — LOW (ref 110–370)

## 2018-05-05 PROCEDURE — 99232 SBSQ HOSP IP/OBS MODERATE 35: CPT

## 2018-05-05 RX ORDER — MINOCYCLINE HYDROCHLORIDE 45 MG/1
100 TABLET, EXTENDED RELEASE ORAL
Qty: 0 | Refills: 0 | Status: DISCONTINUED | OUTPATIENT
Start: 2018-05-06 | End: 2018-06-06

## 2018-05-05 RX ADMIN — Medication 81 MILLIGRAM(S): at 12:33

## 2018-05-05 RX ADMIN — Medication 500000 UNIT(S): at 22:58

## 2018-05-05 RX ADMIN — Medication 1 TABLET(S): at 06:10

## 2018-05-05 RX ADMIN — OXYCODONE HYDROCHLORIDE 10 MILLIGRAM(S): 5 TABLET ORAL at 17:47

## 2018-05-05 RX ADMIN — TIOTROPIUM BROMIDE 1 CAPSULE(S): 18 CAPSULE ORAL; RESPIRATORY (INHALATION) at 12:32

## 2018-05-05 RX ADMIN — Medication 500000 UNIT(S): at 12:32

## 2018-05-05 RX ADMIN — SENNA PLUS 2 TABLET(S): 8.6 TABLET ORAL at 00:07

## 2018-05-05 RX ADMIN — HYDROMORPHONE HYDROCHLORIDE 2 MILLIGRAM(S): 2 INJECTION INTRAMUSCULAR; INTRAVENOUS; SUBCUTANEOUS at 11:32

## 2018-05-05 RX ADMIN — HYDROMORPHONE HYDROCHLORIDE 2 MILLIGRAM(S): 2 INJECTION INTRAMUSCULAR; INTRAVENOUS; SUBCUTANEOUS at 14:30

## 2018-05-05 RX ADMIN — HYDROMORPHONE HYDROCHLORIDE 2 MILLIGRAM(S): 2 INJECTION INTRAMUSCULAR; INTRAVENOUS; SUBCUTANEOUS at 10:32

## 2018-05-05 RX ADMIN — Medication 3 MILLIGRAM(S): at 22:57

## 2018-05-05 RX ADMIN — POLYETHYLENE GLYCOL 3350 17 GRAM(S): 17 POWDER, FOR SOLUTION ORAL at 12:33

## 2018-05-05 RX ADMIN — HYDROMORPHONE HYDROCHLORIDE 2 MILLIGRAM(S): 2 INJECTION INTRAMUSCULAR; INTRAVENOUS; SUBCUTANEOUS at 15:30

## 2018-05-05 RX ADMIN — Medication 1 TABLET(S): at 22:57

## 2018-05-05 RX ADMIN — BUDESONIDE AND FORMOTEROL FUMARATE DIHYDRATE 2 PUFF(S): 160; 4.5 AEROSOL RESPIRATORY (INHALATION) at 06:10

## 2018-05-05 RX ADMIN — HYDROMORPHONE HYDROCHLORIDE 2 MILLIGRAM(S): 2 INJECTION INTRAMUSCULAR; INTRAVENOUS; SUBCUTANEOUS at 23:30

## 2018-05-05 RX ADMIN — Medication 100 MILLIGRAM(S): at 00:06

## 2018-05-05 RX ADMIN — BUDESONIDE AND FORMOTEROL FUMARATE DIHYDRATE 2 PUFF(S): 160; 4.5 AEROSOL RESPIRATORY (INHALATION) at 17:47

## 2018-05-05 RX ADMIN — OXYCODONE HYDROCHLORIDE 10 MILLIGRAM(S): 5 TABLET ORAL at 06:09

## 2018-05-05 RX ADMIN — Medication 1 APPLICATION(S): at 06:10

## 2018-05-05 RX ADMIN — Medication 100 MILLIGRAM(S): at 22:57

## 2018-05-05 RX ADMIN — HYDROMORPHONE HYDROCHLORIDE 2 MILLIGRAM(S): 2 INJECTION INTRAMUSCULAR; INTRAVENOUS; SUBCUTANEOUS at 22:57

## 2018-05-05 RX ADMIN — Medication 500000 UNIT(S): at 17:47

## 2018-05-05 RX ADMIN — Medication 1 TABLET(S): at 00:06

## 2018-05-05 RX ADMIN — Medication 500000 UNIT(S): at 00:07

## 2018-05-05 RX ADMIN — ZINC OXIDE 1 APPLICATION(S): 200 OINTMENT TOPICAL at 14:10

## 2018-05-05 RX ADMIN — Medication 500 MILLIGRAM(S): at 12:32

## 2018-05-05 RX ADMIN — OXYCODONE HYDROCHLORIDE 10 MILLIGRAM(S): 5 TABLET ORAL at 06:39

## 2018-05-05 RX ADMIN — Medication 500000 UNIT(S): at 06:09

## 2018-05-05 RX ADMIN — Medication 100 MILLIGRAM(S): at 06:10

## 2018-05-05 RX ADMIN — PANTOPRAZOLE SODIUM 40 MILLIGRAM(S): 20 TABLET, DELAYED RELEASE ORAL at 06:10

## 2018-05-05 RX ADMIN — WARFARIN SODIUM 1 MILLIGRAM(S): 2.5 TABLET ORAL at 00:06

## 2018-05-05 RX ADMIN — Medication 100 MILLIGRAM(S): at 12:32

## 2018-05-05 RX ADMIN — Medication 325 MILLIGRAM(S): at 12:33

## 2018-05-05 RX ADMIN — DAPTOMYCIN 132 MILLIGRAM(S): 500 INJECTION, POWDER, LYOPHILIZED, FOR SOLUTION INTRAVENOUS at 07:59

## 2018-05-05 RX ADMIN — Medication 1 TABLET(S): at 12:32

## 2018-05-05 RX ADMIN — Medication 1 MILLIGRAM(S): at 12:33

## 2018-05-05 RX ADMIN — Medication 3 MILLIGRAM(S): at 00:06

## 2018-05-05 RX ADMIN — Medication 1 TABLET(S): at 12:33

## 2018-05-05 NOTE — PROGRESS NOTE ADULT - ASSESSMENT
77F with complex PMH/PSH who recently had placement of a right knee antibiotic spacer followed by PRS closure c/b skin necrosis and formation of an eschar over the anterior knee joint. The patient was readmitted with fevers and right thigh cellulitis which was complicated by skin breakdown. Patient also developed wound at the right/mid lower quadrant of the abdomen.  >> Wound care orders clarified with family and nurse. Wound care orders placed in computer (WTD with normal saline moistened kathrin or gauze to be changed three times per day).  >> Antibiotics per ID service.  >> Medical optimization as per primary team.  >> DVT prophylaxis.  >> Protein supplementation of diet to encourage wound healing.   >> A lengthy/extensive conversation was had with the patient, the patient's  (Yonatan), and the patient's daughter (Kylah) regarding the wound care regimen and the anticipated course of the wound care and the wounds themselves. I explained that the wounds will take several weeks to months to heal and that the wound care regimen may/will change based on the status of the wound. The patient and the patient's family understood and accepted this counseling. During the conversation, the patient's  (Yonatan) expressed significant frustration and anger at the perceived care for the patient. He further expressed anger at the perceived lack of care of various physicians taking care of the patient. After re-assuring the patient's  that the nurses were performing the appropriate dressing changes as ordered by the plastic surgery and wound care teams (etc), the patient's  calmed down and thanked me for my time. Subsequently, I changed the patient's dressings and debrided the patient's abdominal wound (the patient's  and daughter were present throughout; the patient's aide was not present). I then answered additional questions and again reviewed the content of our earlier discussion with the patient's  and daughter.     [facility] Plastic Surgery Pager -- [phone]  [facility] Plastic Surgery Pager -- [phone] 77F with complex PMH/PSH who recently had placement of a right knee antibiotic spacer followed by PRS closure c/b skin necrosis and formation of an eschar over the anterior knee joint. The patient was readmitted with fevers and right thigh cellulitis which was complicated by skin breakdown. Patient also developed wound at the right/mid lower quadrant of the abdomen.  >> Wound care orders clarified with family and nurse. Wound care orders placed in computer (WTD with normal saline moistened kathrin or gauze to be changed three times per day).  >> Antibiotics per ID service.  >> Medical optimization as per primary team.  >> DVT prophylaxis.  >> Protein supplementation of diet to encourage wound healing.   >> A lengthy/extensive conversation was had with the patient, the patient's  (Yonatan), and the patient's daughter (Kylah) regarding the wound care regimen and the anticipated course of the wound care and the wounds themselves. I explained that the wounds will take several weeks to months to heal and that the wound care regimen may/will change based on the status of the wound. The patient and the patient's family understood and accepted this counseling. During the conversation, the patient's  (Yonatan) expressed significant frustration and anger at the perceived care for the patient. He further expressed anger at the perceived lack of care of various physicians taking care of the patient. After re-assuring the patient's  that the nurses were performing the appropriate dressing changes as ordered by the plastic surgery and wound care teams (etc), the patient's  calmed down and thanked me for my time. Subsequently, I changed the patient's dressings and debrided the patient's abdominal wound (the patient's  and daughter were present throughout; the patient's aide was not present). I then answered additional questions and again reviewed the content of our earlier discussion with the patient's  and daughter. The patient's family understood and accepted this counseling.  >> I then reviewed the wound care orders with the nurse and discussed the aforementioned events with the nurse taking care of the patient as well as the charge nurse.     [facility] Plastic Surgery Pager -- [phone]  [facility] Plastic Surgery Pager -- [phone]

## 2018-05-05 NOTE — PROGRESS NOTE BEHAVIORAL HEALTH - NSBHFUPINTERVALHXFT_PSY_A_CORE
78 y/o  F  with 3 adult children and 8 granchildren, with charted PPHx of anxiety, with PMHx of TKR c/b joint infection, CAD s/p HERBERT, severe AS (s/p TAVR & PPM 12/17 [place] [employer] Model [id number]), h/o MVR, PMR on chronic steroids, asthma, OA, HLD, GERD, Anemia, thigh hematoma with fat necrosis, chronic RLE wound, originally p/w fever on 4/8, found to be septic, [facility] Psych initially consulted 4/17 for tearfulness/low mood, asked today to follow-up for continued tearfulness and low mood in the context of prolonged hospitalization. The patient intermittently weeps throughout interview but is redirectable and consolable. States she is "tired of everything" and "just wants to let go." Denies active suicidal intent/plan. Endorses low mood in the context of poorly controlled pain, prolonged hospitalization, and ongoing disability. Endorses fatigue, frequently stating "I'm tired" throughout interview. Denies insomnia, poor appetite, anxiety, difficulty concentrating, memory difficulties, AH/VH, paranoia/fear of harm. Fully oriented (x4/4) and attention 5/5. States family is loving and supportive. Begins to cry when asked about marriage, stating she is upset that she's been in the hospital instead of spending time with her  of 61 years. States she doesn't want to take any additional medications and would like to discuss any proposed changes with her son, who is a pharmacist. Per aide at bedside, pt has been needy and does not wish to be left alone.     Called  Yonatan Rdz ([phone]) who expressed multiple frustrations with pt's prolonged hospitalization and perceived lack of progress on her leg wounds. States wife is upset at situation. 78 y/o  F  with 3 adult children and 8 granchildren, with charted PPHx of anxiety, with PMHx of TKR c/b joint infection, CAD s/p HERBERT, severe AS (s/p TAVR & PPM 12/17 [place] [employer] Model [id number]), h/o MVR, PMR on chronic steroids, asthma, OA, HLD, GERD, Anemia, thigh hematoma with fat necrosis, chronic RLE wound, originally p/w fever on 4/8, found to be septic, [facility] Psych initially consulted 4/17 for tearfulness/low mood, asked today to follow-up for continued tearfulness and low mood in the context of prolonged hospitalization. The patient intermittently tearful throughout interview but is redirectable and consolable. States she is "tired of everything" and "just wants to let go." Denies active suicidal intent/plan. Endorses low mood in the context of poorly controlled pain, prolonged hospitalization, and ongoing disability. Endorses fatigue, frequently stating "I'm tired" throughout interview. Denies insomnia, poor appetite, anxiety, difficulty concentrating, memory difficulties, AH/VH, paranoia/fear of harm. Fully oriented (x4/4) and attention 5/5. States family is loving and supportive. Begins to cry when asked about marriage, stating she is upset that she's been in the hospital instead of spending time with her  of 61 years. States she doesn't want to take any additional medications and would like to discuss any proposed changes with her son, who is a pharmacist. Per aide at bedside, pt has been needy and does not wish to be left alone.     Called  Yonatan Rdz ([phone]) who expressed multiple frustrations with pt's prolonged hospitalization and perceived lack of progress on her leg wounds. States wife is upset at situation.

## 2018-05-05 NOTE — PROGRESS NOTE ADULT - SUBJECTIVE AND OBJECTIVE BOX
CC: f/u for mrsa knee prosthetic infection    Patient reports she had her wounds debrided today and I am not allowed to take off the dressing.     REVIEW OF SYSTEMS:  All other review of systems negative (Comprehensive ROS)    Antimicrobials Day #  :  DAPTOmycin IVPB 800 milliGRAM(s) IV Intermittent every 24 hours  nystatin    Suspension 268520 Unit(s) Oral every 6 hours    Other Medications Reviewed    T(F): 98 (05-05-18 @ 14:12), Max: 98.4 (05-05-18 @ 00:55)  HR: 98 (05-05-18 @ 14:12)  BP: 122/76 (05-05-18 @ 14:12)  RR: 18 (05-05-18 @ 14:12)  SpO2: 97% (05-05-18 @ 14:12)  Wt(kg): --    PHYSICAL EXAM:  General: alert, no acute distress  Eyes:  anicteric, no conjunctival injection, no discharge  Oropharynx: no lesions or injection 	  Neck: supple, without adenopathy  Lungs: clear to auscultation  Heart: regular rate and rhythm; no murmur, rubs or gallops  Abdomen: soft, nondistended, nontender, without mass or organomegaly  Skin: no lesions  Extremities: no clubbing, cyanosis,.  Leg wounds dressed, abdomen wound dressed  Neurologic: alert, oriented, moves all extremities    LAB RESULTS:    05-04    137  |  97  |  8   ----------------------------<  104<H>  3.6   |  29  |  0.61    Ca    8.6      04 May 2018 06:37        Assessment:  Patient with mrsa prosthetic knee infection s/p spacer change with ongoing poor wound healing and necrosed skin at site of thigh hematoma and abdomen s/p debridement. no uncontrolled infection apparent at present  Plan: change to suppressive minocycline  local care per plastics

## 2018-05-05 NOTE — PROGRESS NOTE BEHAVIORAL HEALTH - RISK ASSESSMENT
Acute risk factors for self-harm include passive SI and hopelessness in the context of chronic unstable medical condition resulting in pain and disability. No e/o acute mood episodes, psychosis, remigio, or ongoing substance intoxication. Patient denies active suicidal intent/plan and names multiple protective factors, specifically supportive and loving family. Acute risk factors for self-harm include passive SI and hopelessness in the context of chronic unstable medical condition resulting in pain and disability. No e/o acute mood episodes, psychosis, remigio, or ongoing substance intoxication. Patient denies active suicidal intent/plan and names multiple protective factors, specifically supportive and loving family. Not currently meeting criteria for psychiatric inpatient hospitalization.

## 2018-05-05 NOTE — PROGRESS NOTE ADULT - PROBLEM SELECTOR PLAN 9
abnormal CT--c/w aspiration  Sp and Sw evaluation--aspiration precautions--ABX as per ID-total of 7 d overlap-completed -s/p daptomycin--now minocycline abnormal CT--c/w aspiration  Sp and Sw evaluation--aspiration precautions--ABX as per ID-total of 7 d overlap-completed -s/p daptomycin as of 5/5--now minocycline

## 2018-05-05 NOTE — PROGRESS NOTE ADULT - ATTENDING COMMENTS
as above-  Pulm relatively stable-atelectasis, prior PE, asthma, cardiac Dz  Inhaler regimen as outlined above  DVT rx /prophylaxis  ID follow up of ABX  completed 5/4--then minocycline   PT evaluation and Psych evaluation    ortho/plastics follow up--?AKA-if conservative rx-not successful  Rehab pending    Everett Kumar MD-Pulmonary   [phone] as above-  Pulm relatively stable-atelectasis, prior PE, asthma, cardiac Dz  Inhaler regimen as outlined above  DVT rx /prophylaxis  ID follow up of ABX  completed 5/5--then minocycline   PT evaluation and Psych evaluation    ortho/plastics follow up--?AKA-if conservative rx-not successful  Rehab pending    Everett Kumar MD-Pulmonary   [phone]

## 2018-05-05 NOTE — PROGRESS NOTE BEHAVIORAL HEALTH - SUMMARY
78 y/o  F  with 3 adult children and 8 granchildren, with charted PPHx of anxiety, with PMHx of TKR c/b joint infection, CAD s/p HERBERT, severe AS (s/p TAVR & PPM 12/17 [place] [employer] Model [id number]), h/o MVR, PMR on chronic steroids, asthma, OA, HLD, GERD, Anemia, thigh hematoma with fat necrosis, chronic RLE wound, originally p/w fever on 4/8, found to be septic, [facility] Psych initially consulted 4/17 for tearfulness/low mood, asked today to follow-up for continued tearfulness and low mood in the context of prolonged hospitalization. Pt presents as constricted to depression, intermittently tearful, c/o fatigue, expressing hopelessness and passive SI in the context of prolonged hospitalization, disability, and pain. Denying other syx of depression, active suicidal intent/plan, anxiety, AH/VH, and fear of harm/paranoia. A&Ox4/4. Presence of supportive familial relationships. Supportive psychotherapy provided at bedside. Dx adjustment d/o with depressed mood. 76 y/o  F  with 3 adult children and 8 granchildren, with charted PPHx of anxiety, with PMHx of TKR c/b joint infection, CAD s/p HERBERT, severe AS (s/p TAVR & PPM 12/17 [place] [employer] Model [id number]), h/o MVR, PMR on chronic steroids, asthma, OA, HLD, GERD, Anemia, thigh hematoma with fat necrosis, chronic RLE wound, originally p/w fever on 4/8, found to be septic, [facility] Psych initially consulted 4/17 for tearfulness/low mood, asked today to follow-up for continued tearfulness and low mood in the context of prolonged hospitalization. Pt presents as constricted to depression, intermittently tearful, c/o fatigue, expressing hopelessness and passive SI in the context of prolonged hospitalization, disability, and pain. Denying other syx of depression, active suicidal intent/plan, anxiety, AH/VH, and fear of harm/paranoia. A&Ox4/4. Presence of supportive familial relationships. Supportive psychotherapy provided at bedside. No indication for pharmacotherapy at this time given risks and unlikely benefit for adjustment d/o. Dx adjustment d/o with depressed mood.

## 2018-05-05 NOTE — PROGRESS NOTE ADULT - SUBJECTIVE AND OBJECTIVE BOX
SUBJECTIVE:  Doing well.   No overnight events.     OBJECTIVE:     ** VITAL SIGNS / I&O's **    T(C): 36.7 (05-05-18 @ 14:12), Max: 36.9 (05-05-18 @ 00:55)  T(F): 98 (05-05-18 @ 14:12), Max: 98.4 (05-05-18 @ 00:55)  HR: 98 (05-05-18 @ 14:12) (80 - 98)  BP: 122/76 (05-05-18 @ 14:12) (109/70 - 125/75)  RR: 18 (05-05-18 @ 14:12) (18 - 18)  SpO2: 97% (05-05-18 @ 14:12) (94% - 97%)      04 May 2018 07:01  -  05 May 2018 07:00  --------------------------------------------------------  IN:    IV PiggyBack: 50 mL    Oral Fluid: 1140 mL  Total IN: 1190 mL    OUT:  Total OUT: 0 mL    Total NET: 1190 mL          ** PHYSICAL EXAM **    -- CONSTITUTIONAL: AOx3. NAD.   -- CARDIOVASCULAR: Regular rate and rhythm. S1, S2.  -- RESPIRATORY: Bilateral breath sounds.   -- ABDOMEN: Open wound of Right/Mid lower quadrant. Approximately 2-3cm diameter. Necrotic debris/fat at center. Foul odor. This material was debrided sharply with scissors. (The patient received pre-procedural lidocaine 1% with epinephrine 1:100,000 10cc in the region around the wound.) After the debridement, the wound was clean with minimal active bleeding. There was tracking/tunnelling of the wound in the 2 o'clock position for a distance of 3-4cm. This wound was then packed with normal-saline-moistened kathrin wrap and covered with an ABD pad.  -- EXTREMITIES: The right thigh wound has an eschar present over the superolateral portion. The rest of the wound is stable. It extends from the anterior aspect from the midline to the medial aspect midline. The wound is full thickness throughout. There is mild erythema surrounding the edges of the incision. This is stable and highly localized. The wound is not malodorous. There is no active bleeding. There is no discharge or drainage. This wound was then packed with normal-saline-moistened kathrin wrap and covered with an ABD pad. The right distal thigh / knee incision is intact and healing well. There is no evidence of dehiscence. Over the knee there is an eschar measuring approximately 10cm in diameter. The medial aspect demonstrates dehiscence from the intact skin edge. There is no tunnelling or undermining. There is no drainage or active bleeding. This wound was packed with normal-saline-moistened gauze 4x4s and covered with an ABD pad.    ** LABS **    PT/INR -  35.5 sec / 3.07 ratio   ( 05 May 2018 08:16 )

## 2018-05-05 NOTE — PROGRESS NOTE ADULT - SUBJECTIVE AND OBJECTIVE BOX
Patient is a 77y old  Female who presents with a chief complaint of fever (11 Apr 2018 14:25)      SUBJECTIVE / OVERNIGHT EVENTS:    Patient seen and examined.       Vital Signs Last 24 Hrs  T(C): 36.7 (05 May 2018 06:07), Max: 36.9 (05 May 2018 00:55)  T(F): 98 (05 May 2018 06:07), Max: 98.4 (05 May 2018 00:55)  HR: 98 (05 May 2018 06:07) (80 - 98)  BP: 109/70 (05 May 2018 06:07) (109/70 - 125/75)  BP(mean): --  RR: 18 (05 May 2018 06:07) (18 - 18)  SpO2: 95% (05 May 2018 06:07) (94% - 95%)  I&O's Summary    04 May 2018 07:01  -  05 May 2018 07:00  --------------------------------------------------------  IN: 1190 mL / OUT: 0 mL / NET: 1190 mL        PE:  GENERAL: NAD, AAOx3, obese  HEAD:  Atraumatic, Normocephalic  EYES: EOMI, PERRLA, conjunctiva and sclera clear  NECK: Supple, No JVD  CHEST/LUNG: CTABL, No wheeze  HEART: Regular rate and rhythm; no murmur  ABDOMEN: Soft, Nontender, Nondistended; Bowel sounds present, lower abd wound  EXTREMITIES:  2+ Peripheral Pulses, right knee eschar, R upper thigh necrosis  SKIN: No rashes or lesions  NEURO: No focal deficits      LABS:    05-04    137  |  97  |  8   ----------------------------<  104<H>  3.6   |  29  |  0.61    Ca    8.6      04 May 2018 06:37      PT/INR - ( 04 May 2018 07:29 )   PT: 31.1 sec;   INR: 2.70 ratio           CAPILLARY BLOOD GLUCOSE                RADIOLOGY & ADDITIONAL TESTS:    Imaging Personally Reviewed:  [x] YES  [ ] NO    Consultant(s) Notes Reviewed:  [x] YES  [ ] NO    MEDICATIONS  (STANDING):  ascorbic acid 500 milliGRAM(s) Oral daily  aspirin enteric coated 81 milliGRAM(s) Oral daily  buDESOnide 160 MICROgram(s)/formoterol 4.5 MICROgram(s) Inhaler 2 Puff(s) Inhalation two times a day  calcium carbonate 1250 mG + Vitamin D (OsCal 500 + D) 1 Tablet(s) Oral three times a day  Dakins Solution - 1/4 Strength 1 Application(s) Topical three times a day  DAPTOmycin IVPB 800 milliGRAM(s) IV Intermittent every 24 hours  docusate sodium 100 milliGRAM(s) Oral three times a day  ferrous    sulfate 325 milliGRAM(s) Oral daily  folic acid 1 milliGRAM(s) Oral daily  melatonin 3 milliGRAM(s) Oral at bedtime  multivitamin 1 Tablet(s) Oral daily  nystatin    Suspension 704024 Unit(s) Oral every 6 hours  oxyCODONE  ER Tablet 10 milliGRAM(s) Oral every 12 hours  pantoprazole    Tablet 40 milliGRAM(s) Oral before breakfast  polyethylene glycol 3350 17 Gram(s) Oral daily  povidone iodine 10% Solution 1 Application(s) Topical three times a day  sodium chloride 0.9%. 1000 milliLiter(s) (75 mL/Hr) IV Continuous <Continuous>  tiotropium 18 MICROgram(s) Capsule 1 Capsule(s) Inhalation daily  zinc oxide 20% Ointment 1 Application(s) Topical daily    MEDICATIONS  (PRN):  acetaminophen   Tablet 650 milliGRAM(s) Oral every 6 hours PRN For Temp greater than 38 C (100.4 F)  acetaminophen   Tablet. 650 milliGRAM(s) Oral every 6 hours PRN Mild Pain (1 - 3)  HYDROmorphone   Tablet 2 milliGRAM(s) Oral every 4 hours PRN Severe Pain (7 - 10)  lidocaine 2% Viscous 10 milliLiter(s) Swish and Spit every 6 hours PRN Mouth Sores  senna 2 Tablet(s) Oral at bedtime PRN Constipation      Care Discussed with Consultants/Other Providers [x] YES  [ ] NO    HEALTH ISSUES - PROBLEM Dx:  Adjustment disorder, unspecified type  Delirium due to another medical condition  Pneumonia: Pneumonia  Aortic stenosis, severe: Aortic stenosis, severe  MYAH (obstructive sleep apnea): MYAH (obstructive sleep apnea)  Obesity: Obesity  Asthma: Asthma  SOB (shortness of breath): SOB (shortness of breath)  Chronic deep vein thrombosis (DVT) of lower extremity, unspecified laterality, unspecified vein: Chronic deep vein thrombosis (DVT) of lower extremity, unspecified laterality, unspecified vein  Arthralgia of knee, unspecified laterality: Arthralgia of knee, unspecified laterality  Hyperlipidemia, unspecified hyperlipidemia type: Hyperlipidemia, unspecified hyperlipidemia type  Asthma, unspecified asthma severity, unspecified whether complicated, unspecified whether persistent: Asthma, unspecified asthma severity, unspecified whether complicated, unspecified whether persistent  Coronary artery disease involving native coronary artery of native heart without angina pectoris: Coronary artery disease involving native coronary artery of native heart without angina pectoris  Gastroesophageal reflux disease, esophagitis presence not specified: Gastroesophageal reflux disease, esophagitis presence not specified  Fever, unspecified fever cause: Fever, unspecified fever cause Patient is a 77y old  Female who presents with a chief complaint of fever (11 Apr 2018 14:25)      SUBJECTIVE / OVERNIGHT EVENTS:    Patient seen and examined. denies cp sob. tearful about wounds. co sad and depression. co pain right leg when turning and repositioning. pt states she did not get out of bed yesterday.      Vital Signs Last 24 Hrs  T(C): 36.7 (05 May 2018 06:07), Max: 36.9 (05 May 2018 00:55)  T(F): 98 (05 May 2018 06:07), Max: 98.4 (05 May 2018 00:55)  HR: 98 (05 May 2018 06:07) (80 - 98)  BP: 109/70 (05 May 2018 06:07) (109/70 - 125/75)  BP(mean): --  RR: 18 (05 May 2018 06:07) (18 - 18)  SpO2: 95% (05 May 2018 06:07) (94% - 95%)  I&O's Summary    04 May 2018 07:01  -  05 May 2018 07:00  --------------------------------------------------------  IN: 1190 mL / OUT: 0 mL / NET: 1190 mL        PE:  GENERAL: NAD, AAOx3, obese  HEAD:  Atraumatic, Normocephalic  EYES: EOMI, PERRLA, conjunctiva and sclera clear  NECK: Supple, No JVD  CHEST/LUNG: CTABL, No wheeze  HEART: Regular rate and rhythm; no murmur  ABDOMEN: Soft, Nontender, Nondistended; Bowel sounds present, lower abd wound  EXTREMITIES:  2+ Peripheral Pulses, right knee eschar, R upper thigh necrosis  SKIN: No rashes or lesions  NEURO: No focal deficits      LABS:    05-04    137  |  97  |  8   ----------------------------<  104<H>  3.6   |  29  |  0.61    Ca    8.6      04 May 2018 06:37      PT/INR - ( 04 May 2018 07:29 )   PT: 31.1 sec;   INR: 2.70 ratio           CAPILLARY BLOOD GLUCOSE                RADIOLOGY & ADDITIONAL TESTS:    Imaging Personally Reviewed:  [x] YES  [ ] NO    Consultant(s) Notes Reviewed:  [x] YES  [ ] NO    MEDICATIONS  (STANDING):  ascorbic acid 500 milliGRAM(s) Oral daily  aspirin enteric coated 81 milliGRAM(s) Oral daily  buDESOnide 160 MICROgram(s)/formoterol 4.5 MICROgram(s) Inhaler 2 Puff(s) Inhalation two times a day  calcium carbonate 1250 mG + Vitamin D (OsCal 500 + D) 1 Tablet(s) Oral three times a day  Dakins Solution - 1/4 Strength 1 Application(s) Topical three times a day  DAPTOmycin IVPB 800 milliGRAM(s) IV Intermittent every 24 hours  docusate sodium 100 milliGRAM(s) Oral three times a day  ferrous    sulfate 325 milliGRAM(s) Oral daily  folic acid 1 milliGRAM(s) Oral daily  melatonin 3 milliGRAM(s) Oral at bedtime  multivitamin 1 Tablet(s) Oral daily  nystatin    Suspension 075809 Unit(s) Oral every 6 hours  oxyCODONE  ER Tablet 10 milliGRAM(s) Oral every 12 hours  pantoprazole    Tablet 40 milliGRAM(s) Oral before breakfast  polyethylene glycol 3350 17 Gram(s) Oral daily  povidone iodine 10% Solution 1 Application(s) Topical three times a day  sodium chloride 0.9%. 1000 milliLiter(s) (75 mL/Hr) IV Continuous <Continuous>  tiotropium 18 MICROgram(s) Capsule 1 Capsule(s) Inhalation daily  zinc oxide 20% Ointment 1 Application(s) Topical daily    MEDICATIONS  (PRN):  acetaminophen   Tablet 650 milliGRAM(s) Oral every 6 hours PRN For Temp greater than 38 C (100.4 F)  acetaminophen   Tablet. 650 milliGRAM(s) Oral every 6 hours PRN Mild Pain (1 - 3)  HYDROmorphone   Tablet 2 milliGRAM(s) Oral every 4 hours PRN Severe Pain (7 - 10)  lidocaine 2% Viscous 10 milliLiter(s) Swish and Spit every 6 hours PRN Mouth Sores  senna 2 Tablet(s) Oral at bedtime PRN Constipation      Care Discussed with Consultants/Other Providers [x] YES  [ ] NO    HEALTH ISSUES - PROBLEM Dx:  Adjustment disorder, unspecified type  Delirium due to another medical condition  Pneumonia: Pneumonia  Aortic stenosis, severe: Aortic stenosis, severe  MYAH (obstructive sleep apnea): MYAH (obstructive sleep apnea)  Obesity: Obesity  Asthma: Asthma  SOB (shortness of breath): SOB (shortness of breath)  Chronic deep vein thrombosis (DVT) of lower extremity, unspecified laterality, unspecified vein: Chronic deep vein thrombosis (DVT) of lower extremity, unspecified laterality, unspecified vein  Arthralgia of knee, unspecified laterality: Arthralgia of knee, unspecified laterality  Hyperlipidemia, unspecified hyperlipidemia type: Hyperlipidemia, unspecified hyperlipidemia type  Asthma, unspecified asthma severity, unspecified whether complicated, unspecified whether persistent: Asthma, unspecified asthma severity, unspecified whether complicated, unspecified whether persistent  Coronary artery disease involving native coronary artery of native heart without angina pectoris: Coronary artery disease involving native coronary artery of native heart without angina pectoris  Gastroesophageal reflux disease, esophagitis presence not specified: Gastroesophageal reflux disease, esophagitis presence not specified  Fever, unspecified fever cause: Fever, unspecified fever cause

## 2018-05-05 NOTE — PROGRESS NOTE BEHAVIORAL HEALTH - NSBHCHARTREVIEWLAB_PSY_A_CORE FT
05-04    137  |  97  |  8   ----------------------------<  104<H>  3.6   |  29  |  0.61    Ca   8.6   04 May 2018 06:37 /        5/3 CBC reviewed.

## 2018-05-05 NOTE — PROGRESS NOTE BEHAVIORAL HEALTH - NSBHCONSULTMEDS_PSY_A_CORE FT
Recommendations:  1) No standing psychiatric medications recommended at this time.  2) Continue to recommend melatonin 3mg PO PRN for insomnia.   3) [facility] Psych will follow.

## 2018-05-05 NOTE — PROGRESS NOTE BEHAVIORAL HEALTH - AXIS III
TKR c/b joint infection, CAD s/p HERBERT, severe AS (s/p TAVR & PPM 12/17 [employer] Model [id number]), h/o MVR, PMR on chronic steroids, asthma, OA, HLD, GERD, Anemia, thigh hematoma with fat necrosis, chronic RLE wound

## 2018-05-05 NOTE — PROGRESS NOTE ADULT - ASSESSMENT
Patient with infected right tkr s/p rudy, spacer for strept infection, had poor wound healing so returned 3 months after and had spacer exchange and complex wound closure with mrsa infection found. She is about 2 weeks into dapto and returns with fever, leukocytosis, ongoing pain and eschar of right knee wound and what appears to be a hematoma of the right thigh. INR has been up so that is good reason for the thigh findings. Must consider infected hematoma possible. UTI is possible. Pneumonia is possible given basilar rales. retroperitoneal hematoma possible .  PMR decompensation or adrenal insufficiency a consideration at well.    Pulmonary stable relative to prior admits --  wound care in progress  Completed IV ABX-5/4 Patient with infected right tkr s/p rudy, spacer for strept infection, had poor wound healing so returned 3 months after and had spacer exchange and complex wound closure with mrsa infection found. She is about 2 weeks into dapto and returns with fever, leukocytosis, ongoing pain and eschar of right knee wound and what appears to be a hematoma of the right thigh. INR has been up so that is good reason for the thigh findings. Must consider infected hematoma possible. UTI is possible. Pneumonia is possible given basilar rales. retroperitoneal hematoma possible .  PMR decompensation or adrenal insufficiency a consideration at well.    Pulmonary stable relative to prior admits --  wound care in progress  Completed IV ABX-5/5 -42 days

## 2018-05-05 NOTE — PROGRESS NOTE BEHAVIORAL HEALTH - CASE SUMMARY
Pt. was dismissive and she does not want to engage in more evaluation by this MD. She recalled Dr. Reed when she returned back with this MD. she reported is waiting a procedure for her wounds.   I agree with the above findings and plan.

## 2018-05-05 NOTE — PROGRESS NOTE BEHAVIORAL HEALTH - NSBHCHARTREVIEWINVESTIGATE_PSY_A_CORE FT
<-- EKG 4/21, copied text  Ventricular Rate 88 BPM    Atrial Rate 88 BPM    P-R Interval 162 ms    QRS Duration 132 ms     ms    QTc 464 ms    P Axis 39 degrees    R Axis -18 degrees    T Axis 15 degrees    Diagnosis Line SINUS RHYTHM WITH PREMATURE ATRIAL COMPLEXES  RIGHT BUNDLE BRANCH BLOCK  POSSIBLE LATERAL INFARCT , AGE UNDETERMINED  ABNORMAL ECG  end of copied text -->

## 2018-05-05 NOTE — PROGRESS NOTE ADULT - ASSESSMENT
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    # Fever, resolved  # Recent septic Rt TKR - abx spacer exchanged and plastics complex wound closure  # Aspiration PNA, resolved  # Supratherapeutic INR/coagulopathy, resolved  # PMR on chronic steroids  # thigh hematoma, fat necrosis  # poor healing right leg wound    Plan:  remains afebrile, cont Dapto until 5/5, then suppressive minocycline, BC NTD, appreciate ID recs  appreciate heme recs  appreciate plastics surgery recs, local wound care, wound care following  conservative therapey as family declining AKA, to stabilize wounds prior to rehab  appreciate ortho recs  will cont coumadin and asa  pain control, titrate to PO  PT OOB encourage ambulation  bowel regimen    plan of care dw patient and daughter at bedside 76yo f PMH including HLD, GERD, Anxiety, Anemia, CAD s/p HERBERT, severe AS (s/p TAVR & PPM 12/17 [employer] Model [id number]), 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    # Fever, resolved  # Recent septic Rt TKR - abx spacer exchanged and plastics complex wound closure  # Aspiration PNA, resolved  # Supratherapeutic INR/coagulopathy, resolved  # PMR on chronic steroids  # thigh hematoma, fat necrosis  # poor healing right leg wound    Plan:  remains afebrile, cont Dapto until 5/5, then suppressive minocycline, BC NTD, appreciate ID recs  appreciate heme recs, ordered ferritin, iron, tibc  appreciate plastics surgery recs, local wound care, wound care following  conservative therapy as family declining AKA, to stabilize wounds prior to rehab  will ask psych Dr. 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    # Fever, resolved  # Recent septic Rt TKR - abx spacer exchanged and plastics complex wound closure  # Aspiration PNA, resolved  # Supratherapeutic INR/coagulopathy, resolved  # PMR on chronic steroids  # thigh hematoma, fat necrosis  # poor healing right leg wound    Plan:  remains afebrile, cont Dapto until 5/5, then suppressive minocycline, BC NTD, appreciate ID recs  appreciate heme recs, ordered ferritin, iron, tibc  appreciate plastics surgery recs, local wound care, wound care following  conservative therapy as family declining AKA, to stabilize wounds prior to rehab  will ask psych Dr. 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    # Fever, resolved  # Recent septic Rt TKR - abx spacer exchanged and plastics complex wound closure  # Aspiration PNA, resolved  # Supratherapeutic INR/coagulopathy, resolved  # PMR on chronic steroids  # thigh hematoma, fat necrosis  # poor healing right leg wound    Plan:  remains afebrile, cont Dapto until 5/5, then suppressive minocycline, BC NTD, appreciate ID recs  appreciate heme recs, ordered ferritin, iron, tibc  appreciate plastics surgery recs, local wound care, wound care following  conservative therapy as family declining AKA, to stabilize wounds prior to rehab  will ask psych to help with patient's anxiety with wound changes, depression with poor wound healing and prolonged hospitalization  appreciate ortho recs  will cont coumadin and asa  pain control on oxy IR 10mg BID and dilaudid 2mg q4 prn, will ask palliative for pain recs  PT OOB encourage ambulation  bowel regimen    plan of care dw patient and NP

## 2018-05-05 NOTE — PROGRESS NOTE ADULT - PROBLEM SELECTOR PLAN 4
multifactorial-leg and lung--on ABX as per ID (daptomycin)--next minocycline  need to reconsider other sources of infection-?endocarditis-if fevers persist/recurs multifactorial-leg and lung--on ABX as per ID (daptomycin-until 5/5)--next minocycline  need to reconsider other sources of infection-?endocarditis-if fevers persist/recurs

## 2018-05-05 NOTE — PROGRESS NOTE BEHAVIORAL HEALTH - NSBHCHARTREVIEWVS_PSY_A_CORE FT
ICU Vital Signs Last 24 Hrs  T(C): 36.7 (05 May 2018 06:07), Max: 36.9 (05 May 2018 00:55)  T(F): 98 (05 May 2018 06:07), Max: 98.4 (05 May 2018 00:55)  HR: 98 (05 May 2018 06:07) (80 - 98)  BP: 109/70 (05 May 2018 06:07) (109/70 - 125/75)  BP(mean): --  ABP: --  ABP(mean): --  RR: 18 (05 May 2018 06:07) (18 - 18)  SpO2: 95% (05 May 2018 06:07) (94% - 95%)

## 2018-05-06 LAB
HCT VFR BLD CALC: 27.4 % — LOW (ref 34.5–45)
HGB BLD-MCNC: 8.5 G/DL — LOW (ref 11.5–15.5)
INR BLD: 3.56 RATIO — HIGH (ref 0.88–1.16)
MCHC RBC-ENTMCNC: 27.6 PG — SIGNIFICANT CHANGE UP (ref 27–34)
MCHC RBC-ENTMCNC: 31 GM/DL — LOW (ref 32–36)
MCV RBC AUTO: 89 FL — SIGNIFICANT CHANGE UP (ref 80–100)
PLATELET # BLD AUTO: 305 K/UL — SIGNIFICANT CHANGE UP (ref 150–400)
PROTHROM AB SERPL-ACNC: 39.8 SEC — HIGH (ref 9.8–12.7)
RBC # BLD: 3.08 M/UL — LOW (ref 3.8–5.2)
RBC # FLD: 17.2 % — HIGH (ref 10.3–14.5)
WBC # BLD: 12.68 K/UL — HIGH (ref 3.8–10.5)
WBC # FLD AUTO: 12.68 K/UL — HIGH (ref 3.8–10.5)

## 2018-05-06 PROCEDURE — 99232 SBSQ HOSP IP/OBS MODERATE 35: CPT

## 2018-05-06 RX ORDER — NYSTATIN CREAM 100000 [USP'U]/G
1 CREAM TOPICAL
Qty: 0 | Refills: 0 | Status: DISCONTINUED | OUTPATIENT
Start: 2018-05-06 | End: 2018-05-17

## 2018-05-06 RX ORDER — HYDROMORPHONE HYDROCHLORIDE 2 MG/ML
0.5 INJECTION INTRAMUSCULAR; INTRAVENOUS; SUBCUTANEOUS ONCE
Qty: 0 | Refills: 0 | Status: DISCONTINUED | OUTPATIENT
Start: 2018-05-06 | End: 2018-05-06

## 2018-05-06 RX ADMIN — OXYCODONE HYDROCHLORIDE 10 MILLIGRAM(S): 5 TABLET ORAL at 17:35

## 2018-05-06 RX ADMIN — HYDROMORPHONE HYDROCHLORIDE 2 MILLIGRAM(S): 2 INJECTION INTRAMUSCULAR; INTRAVENOUS; SUBCUTANEOUS at 14:18

## 2018-05-06 RX ADMIN — Medication 500000 UNIT(S): at 17:37

## 2018-05-06 RX ADMIN — Medication 81 MILLIGRAM(S): at 12:41

## 2018-05-06 RX ADMIN — Medication 325 MILLIGRAM(S): at 12:41

## 2018-05-06 RX ADMIN — PANTOPRAZOLE SODIUM 40 MILLIGRAM(S): 20 TABLET, DELAYED RELEASE ORAL at 06:29

## 2018-05-06 RX ADMIN — OXYCODONE HYDROCHLORIDE 10 MILLIGRAM(S): 5 TABLET ORAL at 06:29

## 2018-05-06 RX ADMIN — Medication 500000 UNIT(S): at 06:29

## 2018-05-06 RX ADMIN — NYSTATIN CREAM 1 APPLICATION(S): 100000 CREAM TOPICAL at 23:00

## 2018-05-06 RX ADMIN — NYSTATIN CREAM 1 APPLICATION(S): 100000 CREAM TOPICAL at 12:40

## 2018-05-06 RX ADMIN — Medication 3 MILLIGRAM(S): at 22:56

## 2018-05-06 RX ADMIN — Medication 1 TABLET(S): at 22:56

## 2018-05-06 RX ADMIN — Medication 500000 UNIT(S): at 23:02

## 2018-05-06 RX ADMIN — OXYCODONE HYDROCHLORIDE 10 MILLIGRAM(S): 5 TABLET ORAL at 07:00

## 2018-05-06 RX ADMIN — MINOCYCLINE HYDROCHLORIDE 100 MILLIGRAM(S): 45 TABLET, EXTENDED RELEASE ORAL at 06:29

## 2018-05-06 RX ADMIN — Medication 500 MILLIGRAM(S): at 12:41

## 2018-05-06 RX ADMIN — TIOTROPIUM BROMIDE 1 CAPSULE(S): 18 CAPSULE ORAL; RESPIRATORY (INHALATION) at 12:41

## 2018-05-06 RX ADMIN — BUDESONIDE AND FORMOTEROL FUMARATE DIHYDRATE 2 PUFF(S): 160; 4.5 AEROSOL RESPIRATORY (INHALATION) at 06:29

## 2018-05-06 RX ADMIN — Medication 500000 UNIT(S): at 12:40

## 2018-05-06 RX ADMIN — HYDROMORPHONE HYDROCHLORIDE 2 MILLIGRAM(S): 2 INJECTION INTRAMUSCULAR; INTRAVENOUS; SUBCUTANEOUS at 15:15

## 2018-05-06 RX ADMIN — Medication 100 MILLIGRAM(S): at 06:29

## 2018-05-06 RX ADMIN — Medication 1 TABLET(S): at 06:29

## 2018-05-06 RX ADMIN — Medication 1 TABLET(S): at 14:18

## 2018-05-06 RX ADMIN — BUDESONIDE AND FORMOTEROL FUMARATE DIHYDRATE 2 PUFF(S): 160; 4.5 AEROSOL RESPIRATORY (INHALATION) at 17:36

## 2018-05-06 RX ADMIN — MINOCYCLINE HYDROCHLORIDE 100 MILLIGRAM(S): 45 TABLET, EXTENDED RELEASE ORAL at 17:37

## 2018-05-06 RX ADMIN — ZINC OXIDE 1 APPLICATION(S): 200 OINTMENT TOPICAL at 12:42

## 2018-05-06 RX ADMIN — Medication 100 MILLIGRAM(S): at 14:18

## 2018-05-06 RX ADMIN — Medication 1 TABLET(S): at 12:41

## 2018-05-06 RX ADMIN — OXYCODONE HYDROCHLORIDE 10 MILLIGRAM(S): 5 TABLET ORAL at 18:35

## 2018-05-06 RX ADMIN — HYDROMORPHONE HYDROCHLORIDE 0.5 MILLIGRAM(S): 2 INJECTION INTRAMUSCULAR; INTRAVENOUS; SUBCUTANEOUS at 08:51

## 2018-05-06 RX ADMIN — Medication 1 MILLIGRAM(S): at 12:41

## 2018-05-06 RX ADMIN — Medication 100 MILLIGRAM(S): at 22:56

## 2018-05-06 RX ADMIN — HYDROMORPHONE HYDROCHLORIDE 0.5 MILLIGRAM(S): 2 INJECTION INTRAMUSCULAR; INTRAVENOUS; SUBCUTANEOUS at 08:33

## 2018-05-06 RX ADMIN — POLYETHYLENE GLYCOL 3350 17 GRAM(S): 17 POWDER, FOR SOLUTION ORAL at 17:36

## 2018-05-06 NOTE — PROGRESS NOTE ADULT - ASSESSMENT
Patient with infected right tkr s/p rudy, spacer for strept infection, had poor wound healing so returned 3 months after and had spacer exchange and complex wound closure with mrsa infection found. She is about 2 weeks into dapto and returns with fever, leukocytosis, ongoing pain and eschar of right knee wound and what appears to be a hematoma of the right thigh. INR has been up so that is good reason for the thigh findings. Must consider infected hematoma possible. UTI is possible. Pneumonia is possible given basilar rales. retroperitoneal hematoma possible .  PMR decompensation or adrenal insufficiency a consideration at well.    Pulmonary stable relative to prior admits --  wound care in progress  Completed IV ABX-5/5 -42 days Patient with infected right tkr s/p rudy, spacer for strept infection, had poor wound healing so returned 3 months after and had spacer exchange and complex wound closure with mrsa infection found. She is about 2 weeks into dapto and returns with fever, leukocytosis, ongoing pain and eschar of right knee wound and what appears to be a hematoma of the right thigh. INR has been up so that is good reason for the thigh findings. Must consider infected hematoma possible. UTI is possible. Pneumonia is possible given basilar rales. retroperitoneal hematoma possible .  PMR decompensation or adrenal insufficiency a consideration at well.    Pulmonary stable relative to prior admits --  wound care in progress  Completed IV ABX-5/5 -42 days  Debridement done-5/5

## 2018-05-06 NOTE — PROGRESS NOTE ADULT - ATTENDING COMMENTS
as above-  Pulm relatively stable-atelectasis, prior PE, asthma, cardiac Dz  Inhaler regimen as outlined above  DVT rx /prophylaxis  ID follow up of ABX  completed 5/5--then minocycline   PT evaluation and Psych evaluation    ortho/plastics follow up--?AKA-if conservative rx-not successful  Rehab pending    Everett Kumar MD-Pulmonary   [phone] as above-  Pulm relatively stable-atelectasis, prior PE, asthma, cardiac Dz  Inhaler regimen as outlined above  DVT rx /prophylaxis  ID follow up of ABX  completed 5/5--then minocycline  (debridement done 5/5)   PT evaluation and Psych evaluation    ortho/plastics follow up--?AKA-if conservative rx-not successful  Rehab pending    Everett Kumar MD-Pulmonary   [phone]

## 2018-05-06 NOTE — PROGRESS NOTE ADULT - SUBJECTIVE AND OBJECTIVE BOX
CHIEF COMPLAINT:    Interval Events:    REVIEW OF SYSTEMS:  Constitutional: No fevers or chills. No weight loss. No fatigue or generalized malaise.  Eyes: No itching or discharge from the eyes  ENT: No ear pain. No ear discharge. No nasal congestion. No post nasal drip. No epistaxis. No throat pain. No sore throat. No difficulty swallowing.   CV: No chest pain. No palpitations. No lightheadedness or dizziness.   Resp: No dyspnea at rest. No dyspnea on exertion. No orthopnea. No wheezing. No cough. No stridor. No sputum production. No chest pain with respiration.  GI: No nausea. No vomiting. No diarrhea.  MSK: No joint pain or pain in any extremities  Integumentary: No skin lesions. No pedal edema.  Neurological: No gross motor weakness. No sensory changes.  [ ] All other systems negative  [ ] Unable to assess ROS because ________    OBJECTIVE:  ICU Vital Signs Last 24 Hrs  T(C): 37.2 (06 May 2018 00:50), Max: 37.7 (05 May 2018 22:33)  T(F): 99 (06 May 2018 00:50), Max: 99.8 (05 May 2018 22:33)  HR: 99 (06 May 2018 00:50) (98 - 100)  BP: 112/69 (06 May 2018 00:50) (109/70 - 122/76)  BP(mean): --  ABP: --  ABP(mean): --  RR: 18 (06 May 2018 00:50) (18 - 18)  SpO2: 96% (06 May 2018 00:50) (94% - 97%)        05-04 @ 07:01  -  05-05 @ 07:00  --------------------------------------------------------  IN: 1190 mL / OUT: 0 mL / NET: 1190 mL    05-05 @ 07:01  -  05-06 @ 05:01  --------------------------------------------------------  IN: 530 mL / OUT: 0 mL / NET: 530 mL      CAPILLARY BLOOD GLUCOSE          PHYSICAL EXAM:  General: Awake, alert, oriented X 3.   HEENT: Atraumatic, normocephalic.                 Mallampatti Grade                 No nasal congestion.                No tonsillar or pharyngeal exudates.  Lymph Nodes: No palpable lymphadenopathy  Neck: No JVD. No carotid bruit.   Respiratory: Normal chest expansion                         Normal percussion                         Normal and equal air entry                         No wheeze, rhonchi or rales.  Cardiovascular: S1 S2 normal. No murmurs, rubs or gallops.   Abdomen: Soft, non-tender, non-distended. No organomegaly.  Extremities: Warm to touch. Peripheral pulse palpable. No pedal edema.   Skin: No rashes or skin lesions  Neurological: Motor and sensory examination equal and normal in all four extremities.  Psychiatry: Appropriate mood and affect.    HOSPITAL MEDICATIONS:  MEDICATIONS  (STANDING):  ascorbic acid 500 milliGRAM(s) Oral daily  aspirin enteric coated 81 milliGRAM(s) Oral daily  buDESOnide 160 MICROgram(s)/formoterol 4.5 MICROgram(s) Inhaler 2 Puff(s) Inhalation two times a day  calcium carbonate 1250 mG + Vitamin D (OsCal 500 + D) 1 Tablet(s) Oral three times a day  docusate sodium 100 milliGRAM(s) Oral three times a day  ferrous    sulfate 325 milliGRAM(s) Oral daily  folic acid 1 milliGRAM(s) Oral daily  lidocaine 1%/EPINEPHrine 1:100,000 Inj 10 milliLiter(s) Local Injection once  melatonin 3 milliGRAM(s) Oral at bedtime  minocycline 100 milliGRAM(s) Oral two times a day  multivitamin 1 Tablet(s) Oral daily  nystatin    Suspension 490160 Unit(s) Oral every 6 hours  oxyCODONE  ER Tablet 10 milliGRAM(s) Oral every 12 hours  pantoprazole    Tablet 40 milliGRAM(s) Oral before breakfast  polyethylene glycol 3350 17 Gram(s) Oral daily  tiotropium 18 MICROgram(s) Capsule 1 Capsule(s) Inhalation daily  zinc oxide 20% Ointment 1 Application(s) Topical daily    MEDICATIONS  (PRN):  acetaminophen   Tablet 650 milliGRAM(s) Oral every 6 hours PRN For Temp greater than 38 C (100.4 F)  acetaminophen   Tablet. 650 milliGRAM(s) Oral every 6 hours PRN Mild Pain (1 - 3)  HYDROmorphone   Tablet 2 milliGRAM(s) Oral every 4 hours PRN Severe Pain (7 - 10)  lidocaine 2% Viscous 10 milliLiter(s) Swish and Spit every 6 hours PRN Mouth Sores  senna 2 Tablet(s) Oral at bedtime PRN Constipation      LABS:    05-04    137  |  97  |  8   ----------------------------<  104<H>  3.6   |  29  |  0.61    Ca    8.6      04 May 2018 06:37      PT/INR - ( 05 May 2018 08:16 )   PT: 35.5 sec;   INR: 3.07 ratio                   MICROBIOLOGY:     RADIOLOGY:  [ ] Reviewed and interpreted by me    Point of Care Ultrasound Findings:    PFT:    EKG: CHIEF COMPLAINT: better spirits--fatigued  no sob-happy w/wound debridement    Interval Events: plastics wound debridement    REVIEW OF SYSTEMS:  Constitutional: No fevers or chills. No weight loss. + fatigue or generalized malaise.  Eyes: No itching or discharge from the eyes  ENT: No ear pain. No ear discharge. No nasal congestion. No post nasal drip. No epistaxis. No throat pain. No sore throat. No difficulty swallowing.   CV: No chest pain. No palpitations. No lightheadedness or dizziness.   Resp: No dyspnea at rest. + dyspnea on exertion. No orthopnea. No wheezing. No cough. No stridor. No sputum production. No chest pain with respiration.  GI: No nausea. No vomiting. No diarrhea.  MSK: No joint pain or pain in any extremities  Integumentary: No skin lesions. No pedal edema. Except leg pain  Neurological: No gross motor weakness. No sensory changes.  [+ ] All other systems negative  [ ] Unable to assess ROS because ________    OBJECTIVE:  ICU Vital Signs Last 24 Hrs  T(C): 37.2 (06 May 2018 00:50), Max: 37.7 (05 May 2018 22:33)  T(F): 99 (06 May 2018 00:50), Max: 99.8 (05 May 2018 22:33)  HR: 99 (06 May 2018 00:50) (98 - 100)  BP: 112/69 (06 May 2018 00:50) (109/70 - 122/76)  BP(mean): --  ABP: --  ABP(mean): --  RR: 18 (06 May 2018 00:50) (18 - 18)  SpO2: 96% (06 May 2018 00:50) (94% - 97%)        05-04 @ 07:01  -  05-05 @ 07:00  --------------------------------------------------------  IN: 1190 mL / OUT: 0 mL / NET: 1190 mL    05-05 @ 07:01  -  05-06 @ 05:01  --------------------------------------------------------  IN: 530 mL / OUT: 0 mL / NET: 530 mL      CAPILLARY BLOOD GLUCOSE          PHYSICAL EXAM: NAD  General: Awake, alert, oriented X 3.   HEENT: Atraumatic, normocephalic.                 Mallampatti Grade 3                No nasal congestion.                No tonsillar or pharyngeal exudates.  Lymph Nodes: No palpable lymphadenopathy  Neck: No JVD. No carotid bruit.   Respiratory: abnormal chest expansion-reduced BS bases                         Normal percussion                         Normal and equal air entry                         No wheeze, rhonchi or rales.  Cardiovascular: S1 S2 normal. No murmurs, rubs or gallops.   Abdomen: Soft, non-tender, non-distended. No organomegaly.  Extremities: Warm to touch. Peripheral pulse palpable. No pedal edema. R-leg wound  Skin: No rashes or skin lesions  Neurological: Motor and sensory examination equal and normal in all four extremities.  Psychiatry: Appropriate mood and affect.    HOSPITAL MEDICATIONS:  MEDICATIONS  (STANDING):  ascorbic acid 500 milliGRAM(s) Oral daily  aspirin enteric coated 81 milliGRAM(s) Oral daily  buDESOnide 160 MICROgram(s)/formoterol 4.5 MICROgram(s) Inhaler 2 Puff(s) Inhalation two times a day  calcium carbonate 1250 mG + Vitamin D (OsCal 500 + D) 1 Tablet(s) Oral three times a day  docusate sodium 100 milliGRAM(s) Oral three times a day  ferrous    sulfate 325 milliGRAM(s) Oral daily  folic acid 1 milliGRAM(s) Oral daily  lidocaine 1%/EPINEPHrine 1:100,000 Inj 10 milliLiter(s) Local Injection once  melatonin 3 milliGRAM(s) Oral at bedtime  minocycline 100 milliGRAM(s) Oral two times a day  multivitamin 1 Tablet(s) Oral daily  nystatin    Suspension 723897 Unit(s) Oral every 6 hours  oxyCODONE  ER Tablet 10 milliGRAM(s) Oral every 12 hours  pantoprazole    Tablet 40 milliGRAM(s) Oral before breakfast  polyethylene glycol 3350 17 Gram(s) Oral daily  tiotropium 18 MICROgram(s) Capsule 1 Capsule(s) Inhalation daily  zinc oxide 20% Ointment 1 Application(s) Topical daily    MEDICATIONS  (PRN):  acetaminophen   Tablet 650 milliGRAM(s) Oral every 6 hours PRN For Temp greater than 38 C (100.4 F)  acetaminophen   Tablet. 650 milliGRAM(s) Oral every 6 hours PRN Mild Pain (1 - 3)  HYDROmorphone   Tablet 2 milliGRAM(s) Oral every 4 hours PRN Severe Pain (7 - 10)  lidocaine 2% Viscous 10 milliLiter(s) Swish and Spit every 6 hours PRN Mouth Sores  senna 2 Tablet(s) Oral at bedtime PRN Constipation      LABS:    05-04    137  |  97  |  8   ----------------------------<  104<H>  3.6   |  29  |  0.61    Ca    8.6      04 May 2018 06:37      PT/INR - ( 05 May 2018 08:16 )   PT: 35.5 sec;   INR: 3.07 ratio                   MICROBIOLOGY:     RADIOLOGY:  [ ] Reviewed and interpreted by me    Point of Care Ultrasound Findings:    PFT:    EKG:

## 2018-05-06 NOTE — PROGRESS NOTE ADULT - ASSESSMENT
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    # Fever, resolved  # Recent septic Rt TKR - abx spacer exchanged and plastics complex wound closure  # Aspiration PNA, resolved  # Supratherapeutic INR/coagulopathy, resolved  # PMR on chronic steroids  # thigh hematoma, fat necrosis  # poor healing right leg wound    Plan:  remains afebrile, cont suppressive minocycline, appreciate ID recs  appreciate plastics surgery recs, sp debridement today, wounds packed, wound care per recs  conservative therapy as family declining AKA, to stabilize wounds prior to rehab  appreciate ortho recs  will cont coumadin and asa  pain control on oxy IR 10mg BID and dilaudid 2mg q4 prn  PT OOB encourage ambulation  bowel regimen    plan of care dw patient 76yo f PMH including HLD, GERD, Anxiety, Anemia, CAD s/p HERBERT, severe AS (s/p TAVR & PPM 12/17 [employer] Model [id number]), 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    # Fever, resolved  # Recent septic Rt TKR - abx spacer exchanged and plastics complex wound closure  # Aspiration PNA, resolved  # Supratherapeutic INR/coagulopathy, resolved  # PMR on chronic steroids  # thigh hematoma, fat necrosis  # poor healing right leg wound    Plan:  remains afebrile, cont suppressive minocycline, appreciate ID recs  appreciate plastics surgery recs, sp debridement today, wounds packed, wound care per recs  conservative therapy as family declining AKA, to stabilize wounds prior to rehab  appreciate psych eval  appreciate ortho recs  will cont coumadin and asa  pain control on oxy IR 10mg BID and dilaudid 2mg q4 prn  PT OOB encourage ambulation  bowel regimen    plan of care dw patient

## 2018-05-06 NOTE — PROGRESS NOTE ADULT - SUBJECTIVE AND OBJECTIVE BOX
SUBJECTIVE:  Doing well.   No overnight events.     OBJECTIVE:     ** VITAL SIGNS / I&O's **    T(C): 37.2 (05-06-18 @ 00:50), Max: 37.7 (05-05-18 @ 22:33)  T(F): 99 (05-06-18 @ 00:50), Max: 99.8 (05-05-18 @ 22:33)  HR: 99 (05-06-18 @ 00:50) (98 - 100)  BP: 112/69 (05-06-18 @ 00:50) (111/68 - 122/76)  RR: 18 (05-06-18 @ 00:50) (18 - 18)  SpO2: 96% (05-06-18 @ 00:50) (94% - 97%)      05 May 2018 07:01  -  06 May 2018 07:00  --------------------------------------------------------  IN:    IV PiggyBack: 50 mL    Oral Fluid: 940 mL  Total IN: 990 mL    OUT:  Total OUT: 0 mL    Total NET: 990 mL          ** PHYSICAL EXAM **    -- CONSTITUTIONAL: AOx3. NAD.   -- CARDIOVASCULAR: Regular rate and rhythm. S1, S2.  -- RESPIRATORY: Bilateral breath sounds.   -- ABDOMEN: Open wound of Right/Mid lower quadrant. Approximately 2-3cm diameter. Necrotic debris/fat at center. Foul odor. This material was debrided sharply with scissors. (The patient received pre-procedural lidocaine 1% with epinephrine 1:100,000 10cc in the region around the wound.) After the debridement, the wound was clean with minimal active bleeding. There was tracking/tunnelling of the wound in the 2 o'clock position for a distance of 3-4cm. This wound was then packed with normal-saline-moistened kathrin wrap and covered with an ABD pad.  -- EXTREMITIES: The right thigh wound has an eschar present over the superolateral portion. The rest of the wound is stable. It extends from the anterior aspect from the midline to the medial aspect midline. The wound is full thickness throughout. There is mild erythema surrounding the edges of the incision. This is stable and highly localized. The wound is not malodorous. There is no active bleeding. There is no discharge or drainage. This wound was then packed with normal-saline-moistened kathrin wrap and covered with an ABD pad. The right distal thigh / knee incision is intact and healing well. There is no evidence of dehiscence. Over the knee there is an eschar measuring approximately 10cm in diameter. The medial aspect demonstrates dehiscence from the intact skin edge. There is no tunnelling or undermining. There is no drainage or active bleeding. This wound was packed with normal-saline-moistened gauze 4x4s and covered with an ABD pad.

## 2018-05-06 NOTE — PROGRESS NOTE ADULT - SUBJECTIVE AND OBJECTIVE BOX
CC: f/u for mrsa knee infection    Patient reports she is very comfortable right now and wounds were debrided    REVIEW OF SYSTEMS:  All other review of systems negative (Comprehensive ROS)    Antimicrobials Day #  :1  minocycline 100 milliGRAM(s) Oral two times a day  nystatin    Suspension 272336 Unit(s) Oral every 6 hours    Other Medications Reviewed    T(F): 98.7 (05-06-18 @ 13:31), Max: 99.8 (05-05-18 @ 22:33)  HR: 86 (05-06-18 @ 13:31)  BP: 119/71 (05-06-18 @ 13:31)  RR: 18 (05-06-18 @ 13:31)  SpO2: 93% (05-06-18 @ 13:31)  Wt(kg): --    PHYSICAL EXAM:  General: alert, no acute distress  Eyes:  anicteric, no conjunctival injection, no discharge  Oropharynx: no lesions or injection 	  Neck: supple, without adenopathy  Lungs: clear to auscultation  Heart: regular rate and rhythm; no murmur, rubs or gallops  Abdomen: soft, nondistended, nontender, without mass or organomegaly. wound packed  Skin: no lesions  Extremities: right thigh wound debrided and packed, right knee eschar  Neurologic: alert, oriented, moves all extremities    LAB RESULTS:                        8.5    12.68 )-----------( 305      ( 06 May 2018 10:21 )             27.4     Culture - Other (04.27.18 @ 18:18)    Specimen Source: Skin abdominal wound    Culture Results:   No growth at 48 hours      RADIOLOGY REVIEWED:    < from: CT Femur No Cont, Right (04.19.18 @ 19:06) >  IMPRESSION:  1.  Patient status post explantation of right total knee arthroplasty and   washout. Antibiotic cement and intramedullary ruthann are identified in the   right knee joint. No evidence of acute hardware complication.  2.  No focal, drainable fluid collections are identified on today's   examination.  3.  Nonspecific subcutaneous edema involving the patient's pannus and   subcutaneous fat of the medial thigh.      < end of copied text >      Assessment:  Patient with strept septic prosthetic knee this past fall s/p rudy and spacer then returned in spring with nonhealing wound over knee so spacer changed and grew mrsa. She now finished 6 weeks of dapto and is on minocycline day 1. She was treated for pneumonia. She had hematoma of right thigh and abdomen wall with fat and skin necrosis so had debridement done.   Plan: continue minocin  elevated wbc may be reactive to debriding but will closely monitor for emerging infection.

## 2018-05-06 NOTE — PROGRESS NOTE ADULT - PROBLEM SELECTOR PLAN 9
abnormal CT--c/w aspiration  Sp and Sw evaluation--aspiration precautions--ABX as per ID-total of 7 d overlap-completed -s/p daptomycin as of 5/5--now minocycline

## 2018-05-06 NOTE — PROGRESS NOTE ADULT - SUBJECTIVE AND OBJECTIVE BOX
Patient is a 77y old  Female who presents with a chief complaint of fever (11 Apr 2018 14:25)      SUBJECTIVE / OVERNIGHT EVENTS:    Patient seen and examined. sp thigh wound debridement. does not want me to examine her wounds. denies complaints.      Vital Signs Last 24 Hrs  T(C): 37.2 (06 May 2018 00:50), Max: 37.7 (05 May 2018 22:33)  T(F): 99 (06 May 2018 00:50), Max: 99.8 (05 May 2018 22:33)  HR: 99 (06 May 2018 00:50) (98 - 100)  BP: 112/69 (06 May 2018 00:50) (111/68 - 122/76)  BP(mean): --  RR: 18 (06 May 2018 00:50) (18 - 18)  SpO2: 96% (06 May 2018 00:50) (94% - 97%)  I&O's Summary    05 May 2018 07:01  -  06 May 2018 07:00  --------------------------------------------------------  IN: 990 mL / OUT: 0 mL / NET: 990 mL        PE:  GENERAL: NAD, AAOx3, obese  HEAD:  Atraumatic, Normocephalic  EYES: EOMI, PERRLA, conjunctiva and sclera clear  NECK: Supple, No JVD  CHEST/LUNG: CTABL, No wheeze  HEART: Regular rate and rhythm; no murmur  ABDOMEN: Soft, Nontender, Nondistended; Bowel sounds present, lower abd wound  EXTREMITIES:  2+ Peripheral Pulses, right knee eschar, R upper wound and abd wound both packed  SKIN: No rashes or lesions  NEURO: No focal deficits    LABS:          PT/INR - ( 05 May 2018 08:16 )   PT: 35.5 sec;   INR: 3.07 ratio           CAPILLARY BLOOD GLUCOSE                RADIOLOGY & ADDITIONAL TESTS:    Imaging Personally Reviewed:  [x] YES  [ ] NO    Consultant(s) Notes Reviewed:  [x] YES  [ ] NO    MEDICATIONS  (STANDING):  ascorbic acid 500 milliGRAM(s) Oral daily  aspirin enteric coated 81 milliGRAM(s) Oral daily  buDESOnide 160 MICROgram(s)/formoterol 4.5 MICROgram(s) Inhaler 2 Puff(s) Inhalation two times a day  calcium carbonate 1250 mG + Vitamin D (OsCal 500 + D) 1 Tablet(s) Oral three times a day  docusate sodium 100 milliGRAM(s) Oral three times a day  ferrous    sulfate 325 milliGRAM(s) Oral daily  folic acid 1 milliGRAM(s) Oral daily  lidocaine 1%/EPINEPHrine 1:100,000 Inj 10 milliLiter(s) Local Injection once  melatonin 3 milliGRAM(s) Oral at bedtime  minocycline 100 milliGRAM(s) Oral two times a day  multivitamin 1 Tablet(s) Oral daily  nystatin    Suspension 123250 Unit(s) Oral every 6 hours  oxyCODONE  ER Tablet 10 milliGRAM(s) Oral every 12 hours  pantoprazole    Tablet 40 milliGRAM(s) Oral before breakfast  polyethylene glycol 3350 17 Gram(s) Oral daily  tiotropium 18 MICROgram(s) Capsule 1 Capsule(s) Inhalation daily  zinc oxide 20% Ointment 1 Application(s) Topical daily    MEDICATIONS  (PRN):  acetaminophen   Tablet 650 milliGRAM(s) Oral every 6 hours PRN For Temp greater than 38 C (100.4 F)  acetaminophen   Tablet. 650 milliGRAM(s) Oral every 6 hours PRN Mild Pain (1 - 3)  HYDROmorphone   Tablet 2 milliGRAM(s) Oral every 4 hours PRN Severe Pain (7 - 10)  lidocaine 2% Viscous 10 milliLiter(s) Swish and Spit every 6 hours PRN Mouth Sores  senna 2 Tablet(s) Oral at bedtime PRN Constipation      Care Discussed with Consultants/Other Providers [x] YES  [ ] NO    HEALTH ISSUES - PROBLEM Dx:  Adjustment disorder, unspecified type  Delirium due to another medical condition  Pneumonia: Pneumonia  Aortic stenosis, severe: Aortic stenosis, severe  MYAH (obstructive sleep apnea): MYAH (obstructive sleep apnea)  Obesity: Obesity  Asthma: Asthma  SOB (shortness of breath): SOB (shortness of breath)  Chronic deep vein thrombosis (DVT) of lower extremity, unspecified laterality, unspecified vein: Chronic deep vein thrombosis (DVT) of lower extremity, unspecified laterality, unspecified vein  Arthralgia of knee, unspecified laterality: Arthralgia of knee, unspecified laterality  Hyperlipidemia, unspecified hyperlipidemia type: Hyperlipidemia, unspecified hyperlipidemia type  Asthma, unspecified asthma severity, unspecified whether complicated, unspecified whether persistent: Asthma, unspecified asthma severity, unspecified whether complicated, unspecified whether persistent  Coronary artery disease involving native coronary artery of native heart without angina pectoris: Coronary artery disease involving native coronary artery of native heart without angina pectoris  Gastroesophageal reflux disease, esophagitis presence not specified: Gastroesophageal reflux disease, esophagitis presence not specified  Fever, unspecified fever cause: Fever, unspecified fever cause

## 2018-05-06 NOTE — PROGRESS NOTE ADULT - ASSESSMENT
77F with complex PMH/PSH who recently had placement of a right knee antibiotic spacer followed by PRS closure c/b skin necrosis and formation of an eschar over the anterior knee joint. The patient was readmitted with fevers and right thigh cellulitis which was complicated by skin breakdown. Patient also developed wound at the right/mid lower quadrant of the abdomen.  >> Wound care orders clarified with family and nurse. Wound care orders placed in computer (WTD with normal saline moistened kathrin or gauze to be changed three times per day).  >> Antibiotics per ID service.  >> Medical optimization as per primary team.  >> DVT prophylaxis.  >> Protein supplementation of diet to encourage wound healing.   >> Discussed with Dr. Mg. Plan to sharply debride left thigh wound later this AM.    [facility] Plastic Surgery Pager -- [phone]  [facility] Plastic Surgery Pager -- [phone]

## 2018-05-06 NOTE — PROGRESS NOTE ADULT - PROBLEM SELECTOR PLAN 4
multifactorial-leg and lung--on ABX as per ID (daptomycin-until 5/5)--next minocycline  need to reconsider other sources of infection-?endocarditis-if fevers persist/recurs

## 2018-05-07 LAB
ANION GAP SERPL CALC-SCNC: 12 MMOL/L — SIGNIFICANT CHANGE UP (ref 5–17)
BUN SERPL-MCNC: 13 MG/DL — SIGNIFICANT CHANGE UP (ref 7–23)
CALCIUM SERPL-MCNC: 9.1 MG/DL — SIGNIFICANT CHANGE UP (ref 8.4–10.5)
CHLORIDE SERPL-SCNC: 100 MMOL/L — SIGNIFICANT CHANGE UP (ref 96–108)
CO2 SERPL-SCNC: 28 MMOL/L — SIGNIFICANT CHANGE UP (ref 22–31)
CREAT SERPL-MCNC: 0.77 MG/DL — SIGNIFICANT CHANGE UP (ref 0.5–1.3)
GLUCOSE SERPL-MCNC: 107 MG/DL — HIGH (ref 70–99)
HCT VFR BLD CALC: 26.4 % — LOW (ref 34.5–45)
HGB BLD-MCNC: 8.1 G/DL — LOW (ref 11.5–15.5)
INR BLD: 3.46 RATIO — HIGH (ref 0.88–1.16)
MCHC RBC-ENTMCNC: 27.6 PG — SIGNIFICANT CHANGE UP (ref 27–34)
MCHC RBC-ENTMCNC: 30.7 GM/DL — LOW (ref 32–36)
MCV RBC AUTO: 90.1 FL — SIGNIFICANT CHANGE UP (ref 80–100)
PLATELET # BLD AUTO: 276 K/UL — SIGNIFICANT CHANGE UP (ref 150–400)
POTASSIUM SERPL-MCNC: 3.9 MMOL/L — SIGNIFICANT CHANGE UP (ref 3.5–5.3)
POTASSIUM SERPL-SCNC: 3.9 MMOL/L — SIGNIFICANT CHANGE UP (ref 3.5–5.3)
PROTHROM AB SERPL-ACNC: 40.1 SEC — HIGH (ref 10–13.1)
RBC # BLD: 2.93 M/UL — LOW (ref 3.8–5.2)
RBC # FLD: 17.4 % — HIGH (ref 10.3–14.5)
SODIUM SERPL-SCNC: 140 MMOL/L — SIGNIFICANT CHANGE UP (ref 135–145)
WBC # BLD: 11.97 K/UL — HIGH (ref 3.8–10.5)
WBC # FLD AUTO: 11.97 K/UL — HIGH (ref 3.8–10.5)

## 2018-05-07 PROCEDURE — 99232 SBSQ HOSP IP/OBS MODERATE 35: CPT

## 2018-05-07 RX ADMIN — Medication 500000 UNIT(S): at 18:00

## 2018-05-07 RX ADMIN — Medication 81 MILLIGRAM(S): at 13:29

## 2018-05-07 RX ADMIN — Medication 500000 UNIT(S): at 06:46

## 2018-05-07 RX ADMIN — Medication 500000 UNIT(S): at 13:29

## 2018-05-07 RX ADMIN — OXYCODONE HYDROCHLORIDE 10 MILLIGRAM(S): 5 TABLET ORAL at 06:45

## 2018-05-07 RX ADMIN — OXYCODONE HYDROCHLORIDE 10 MILLIGRAM(S): 5 TABLET ORAL at 07:15

## 2018-05-07 RX ADMIN — Medication 500 MILLIGRAM(S): at 13:29

## 2018-05-07 RX ADMIN — PANTOPRAZOLE SODIUM 40 MILLIGRAM(S): 20 TABLET, DELAYED RELEASE ORAL at 06:50

## 2018-05-07 RX ADMIN — NYSTATIN CREAM 1 APPLICATION(S): 100000 CREAM TOPICAL at 23:00

## 2018-05-07 RX ADMIN — Medication 325 MILLIGRAM(S): at 13:29

## 2018-05-07 RX ADMIN — BUDESONIDE AND FORMOTEROL FUMARATE DIHYDRATE 2 PUFF(S): 160; 4.5 AEROSOL RESPIRATORY (INHALATION) at 06:44

## 2018-05-07 RX ADMIN — Medication 100 MILLIGRAM(S): at 06:44

## 2018-05-07 RX ADMIN — ZINC OXIDE 1 APPLICATION(S): 200 OINTMENT TOPICAL at 13:30

## 2018-05-07 RX ADMIN — OXYCODONE HYDROCHLORIDE 10 MILLIGRAM(S): 5 TABLET ORAL at 18:00

## 2018-05-07 RX ADMIN — BUDESONIDE AND FORMOTEROL FUMARATE DIHYDRATE 2 PUFF(S): 160; 4.5 AEROSOL RESPIRATORY (INHALATION) at 18:01

## 2018-05-07 RX ADMIN — Medication 1 TABLET(S): at 06:44

## 2018-05-07 RX ADMIN — Medication 3 MILLIGRAM(S): at 23:04

## 2018-05-07 RX ADMIN — MINOCYCLINE HYDROCHLORIDE 100 MILLIGRAM(S): 45 TABLET, EXTENDED RELEASE ORAL at 06:44

## 2018-05-07 RX ADMIN — Medication 1 TABLET(S): at 23:03

## 2018-05-07 RX ADMIN — Medication 100 MILLIGRAM(S): at 23:03

## 2018-05-07 RX ADMIN — TIOTROPIUM BROMIDE 1 CAPSULE(S): 18 CAPSULE ORAL; RESPIRATORY (INHALATION) at 13:28

## 2018-05-07 RX ADMIN — Medication 1 TABLET(S): at 13:29

## 2018-05-07 RX ADMIN — POLYETHYLENE GLYCOL 3350 17 GRAM(S): 17 POWDER, FOR SOLUTION ORAL at 13:28

## 2018-05-07 RX ADMIN — Medication 1 MILLIGRAM(S): at 13:30

## 2018-05-07 RX ADMIN — Medication 100 MILLIGRAM(S): at 13:29

## 2018-05-07 RX ADMIN — HYDROMORPHONE HYDROCHLORIDE 2 MILLIGRAM(S): 2 INJECTION INTRAMUSCULAR; INTRAVENOUS; SUBCUTANEOUS at 14:43

## 2018-05-07 RX ADMIN — MINOCYCLINE HYDROCHLORIDE 100 MILLIGRAM(S): 45 TABLET, EXTENDED RELEASE ORAL at 18:00

## 2018-05-07 RX ADMIN — Medication 500000 UNIT(S): at 23:03

## 2018-05-07 NOTE — PROGRESS NOTE ADULT - PROBLEM SELECTOR PLAN 4
multifactorial-leg and lung--on ABX as per ID (daptomycin-until 5/5)--next minocycline  need to reconsider other sources of infection-?endocarditis-if fevers persist/recurs multifactorial-leg and lung--on ABX as per ID (daptomycin-completed 5/5)--next minocycline  need to reconsider other sources of infection-?endocarditis-if fevers persist/recurs

## 2018-05-07 NOTE — PHYSICAL THERAPY INITIAL EVALUATION ADULT - PERTINENT HX OF CURRENT PROBLEM, REHAB EVAL
77yoF PMH including HLD, GERD, Anxiety, Anemia, CAD s/p HERBERT, severe AS (s/p TAVR & PPM 12/17 [place] Model [id number]), h/o MVR, PMR on chronic steroids, asthma, OA, s/p TKR w/ recent joint infection s/p explant & abx spacer on 12/23/17, + rehab when had RLE DVT (dvt proph was held 2nd nose bleed) on Coumadin
77yoF PMH including HLD, GERD, Anxiety, Anemia, CAD s/p HERBERT, severe AS (s/p TAVR & PPM 12/17 [employer] Model [id number]), h/o MVR, PMR on chronic steroids, asthma, OA, s/p TKR w/ recent joint infection s/p explant & abx spacer on 12/23/17, + rehab when had RLE DVT (dvt proph was held 2nd nose bleed) on Coumadin

## 2018-05-07 NOTE — PROGRESS NOTE ADULT - SUBJECTIVE AND OBJECTIVE BOX
IRASEMA KOHLER  [id number]    Subjective:  Patient is a 77y old  Female who presents with a chief complaint of fever (11 Apr 2018 14:25). No acute events overnight       Objective:  T(C): 36.5 (05-07-18 @ 06:40), Max: 37.3 (05-06-18 @ 19:15)  HR: 86 (05-07-18 @ 06:40) (86 - 86)  BP: 155/83 (05-07-18 @ 06:40) (116/59 - 155/83)  RR: 20 (05-07-18 @ 06:40) (18 - 20)  SpO2: 95% (05-07-18 @ 06:40) (93% - 96%)  Wt(kg): --                             8.5    12.68 )-----------( 305      ( 06 May 2018 10:21 )             27.4       05-05 @ 07:01  -  05-06 @ 07:00  --------------------------------------------------------  IN: 990 mL / OUT: 0 mL / NET: 990 mL    05-06 @ 07:01  -  05-07 @ 06:49  --------------------------------------------------------  IN: 480 mL / OUT: 0 mL / NET: 480 mL      PHYSICAL EXAM:    General: In pain, resting in bed  MSK: Knee incision eschar stable with fibrinous exudate from medial wound, thigh wound  s/p bedside debridement  Abdomen: Umbilical wound clean with no drainage or expressible fluid      MEDICATIONS  (STANDING):  ascorbic acid 500 milliGRAM(s) Oral daily  aspirin enteric coated 81 milliGRAM(s) Oral daily  buDESOnide 160 MICROgram(s)/formoterol 4.5 MICROgram(s) Inhaler 2 Puff(s) Inhalation two times a day  calcium carbonate 1250 mG + Vitamin D (OsCal 500 + D) 1 Tablet(s) Oral three times a day  docusate sodium 100 milliGRAM(s) Oral three times a day  ferrous    sulfate 325 milliGRAM(s) Oral daily  folic acid 1 milliGRAM(s) Oral daily  lidocaine 1%/EPINEPHrine 1:100,000 Inj 10 milliLiter(s) Local Injection once  melatonin 3 milliGRAM(s) Oral at bedtime  minocycline 100 milliGRAM(s) Oral two times a day  multivitamin 1 Tablet(s) Oral daily  nystatin    Suspension 081723 Unit(s) Oral every 6 hours  nystatin Powder 1 Application(s) Topical two times a day  oxyCODONE  ER Tablet 10 milliGRAM(s) Oral every 12 hours  pantoprazole    Tablet 40 milliGRAM(s) Oral before breakfast  polyethylene glycol 3350 17 Gram(s) Oral daily  tiotropium 18 MICROgram(s) Capsule 1 Capsule(s) Inhalation daily  zinc oxide 20% Ointment 1 Application(s) Topical daily    MEDICATIONS  (PRN):  acetaminophen   Tablet 650 milliGRAM(s) Oral every 6 hours PRN For Temp greater than 38 C (100.4 F)  acetaminophen   Tablet. 650 milliGRAM(s) Oral every 6 hours PRN Mild Pain (1 - 3)  HYDROmorphone   Tablet 2 milliGRAM(s) Oral every 4 hours PRN Severe Pain (7 - 10)  lidocaine 2% Viscous 10 milliLiter(s) Swish and Spit every 6 hours PRN Mouth Sores  senna 2 Tablet(s) Oral at bedtime PRN Constipation      Assessment/Plan:  Patient is a 77y old  Female who presents with a chief complaint of fever (11 Apr 2018 14:25). Wounds  s/p debridement, will need continued dressing changes TID  - Wet to dry dressings to knee, thigh and umbilical wound  - ICDs  - IS

## 2018-05-07 NOTE — PROGRESS NOTE ADULT - ASSESSMENT
Patient with infected right tkr s/p rudy, spacer for strept infection, had poor wound healing so returned 3 months after and had spacer exchange and complex wound closure with mrsa infection found. She is about 2 weeks into dapto and returns with fever, leukocytosis, ongoing pain and eschar of right knee wound and what appears to be a hematoma of the right thigh. INR has been up so that is good reason for the thigh findings. Must consider infected hematoma possible. UTI is possible. Pneumonia is possible given basilar rales. retroperitoneal hematoma possible .  PMR decompensation or adrenal insufficiency a consideration at well.    Pulmonary stable relative to prior admits --  wound care in progress  Completed IV ABX-5/5 -42 days  Debridement done-5/5

## 2018-05-07 NOTE — PROGRESS NOTE ADULT - SUBJECTIVE AND OBJECTIVE BOX
CHIEF COMPLAINT:    Interval Events:    REVIEW OF SYSTEMS:  Constitutional: No fevers or chills. No weight loss. No fatigue or generalized malaise.  Eyes: No itching or discharge from the eyes  ENT: No ear pain. No ear discharge. No nasal congestion. No post nasal drip. No epistaxis. No throat pain. No sore throat. No difficulty swallowing.   CV: No chest pain. No palpitations. No lightheadedness or dizziness.   Resp: No dyspnea at rest. No dyspnea on exertion. No orthopnea. No wheezing. No cough. No stridor. No sputum production. No chest pain with respiration.  GI: No nausea. No vomiting. No diarrhea.  MSK: No joint pain or pain in any extremities  Integumentary: No skin lesions. No pedal edema.  Neurological: No gross motor weakness. No sensory changes.  [ ] All other systems negative  [ ] Unable to assess ROS because ________    OBJECTIVE:  ICU Vital Signs Last 24 Hrs  T(C): 37.3 (06 May 2018 19:15), Max: 37.3 (06 May 2018 19:15)  T(F): 99.1 (06 May 2018 19:15), Max: 99.1 (06 May 2018 19:15)  HR: 86 (06 May 2018 19:15) (86 - 86)  BP: 116/59 (06 May 2018 19:15) (116/59 - 119/71)  BP(mean): --  ABP: --  ABP(mean): --  RR: 18 (06 May 2018 19:15) (18 - 18)  SpO2: 96% (06 May 2018 19:15) (93% - 96%)        05-05 @ 07:01  -  05-06 @ 07:00  --------------------------------------------------------  IN: 990 mL / OUT: 0 mL / NET: 990 mL    05-06 @ 07:01  -  05-07 @ 05:05  --------------------------------------------------------  IN: 480 mL / OUT: 0 mL / NET: 480 mL      CAPILLARY BLOOD GLUCOSE          PHYSICAL EXAM:  General: Awake, alert, oriented X 3.   HEENT: Atraumatic, normocephalic.                 Mallampatti Grade                 No nasal congestion.                No tonsillar or pharyngeal exudates.  Lymph Nodes: No palpable lymphadenopathy  Neck: No JVD. No carotid bruit.   Respiratory: Normal chest expansion                         Normal percussion                         Normal and equal air entry                         No wheeze, rhonchi or rales.  Cardiovascular: S1 S2 normal. No murmurs, rubs or gallops.   Abdomen: Soft, non-tender, non-distended. No organomegaly.  Extremities: Warm to touch. Peripheral pulse palpable. No pedal edema.   Skin: No rashes or skin lesions  Neurological: Motor and sensory examination equal and normal in all four extremities.  Psychiatry: Appropriate mood and affect.    HOSPITAL MEDICATIONS:  MEDICATIONS  (STANDING):  ascorbic acid 500 milliGRAM(s) Oral daily  aspirin enteric coated 81 milliGRAM(s) Oral daily  buDESOnide 160 MICROgram(s)/formoterol 4.5 MICROgram(s) Inhaler 2 Puff(s) Inhalation two times a day  calcium carbonate 1250 mG + Vitamin D (OsCal 500 + D) 1 Tablet(s) Oral three times a day  docusate sodium 100 milliGRAM(s) Oral three times a day  ferrous    sulfate 325 milliGRAM(s) Oral daily  folic acid 1 milliGRAM(s) Oral daily  lidocaine 1%/EPINEPHrine 1:100,000 Inj 10 milliLiter(s) Local Injection once  melatonin 3 milliGRAM(s) Oral at bedtime  minocycline 100 milliGRAM(s) Oral two times a day  multivitamin 1 Tablet(s) Oral daily  nystatin    Suspension 693322 Unit(s) Oral every 6 hours  nystatin Powder 1 Application(s) Topical two times a day  oxyCODONE  ER Tablet 10 milliGRAM(s) Oral every 12 hours  pantoprazole    Tablet 40 milliGRAM(s) Oral before breakfast  polyethylene glycol 3350 17 Gram(s) Oral daily  tiotropium 18 MICROgram(s) Capsule 1 Capsule(s) Inhalation daily  zinc oxide 20% Ointment 1 Application(s) Topical daily    MEDICATIONS  (PRN):  acetaminophen   Tablet 650 milliGRAM(s) Oral every 6 hours PRN For Temp greater than 38 C (100.4 F)  acetaminophen   Tablet. 650 milliGRAM(s) Oral every 6 hours PRN Mild Pain (1 - 3)  HYDROmorphone   Tablet 2 milliGRAM(s) Oral every 4 hours PRN Severe Pain (7 - 10)  lidocaine 2% Viscous 10 milliLiter(s) Swish and Spit every 6 hours PRN Mouth Sores  senna 2 Tablet(s) Oral at bedtime PRN Constipation      LABS:                        8.5    12.68 )-----------( 305      ( 06 May 2018 10:21 )             27.4           PT/INR - ( 06 May 2018 14:35 )   PT: 39.8 sec;   INR: 3.56 ratio                   MICROBIOLOGY:     RADIOLOGY:  [ ] Reviewed and interpreted by me    Point of Care Ultrasound Findings:    PFT:    EKG: CHIEF COMPLAINT: fatigued over all    Interval Events: plastics, ID    REVIEW OF SYSTEMS:  Constitutional: No fevers or chills. No weight loss. + fatigue or generalized malaise.  Eyes: No itching or discharge from the eyes  ENT: No ear pain. No ear discharge. No nasal congestion. No post nasal drip. No epistaxis. No throat pain. No sore throat. No difficulty swallowing.   CV: No chest pain. No palpitations. No lightheadedness or dizziness.   Resp: No dyspnea at rest. No dyspnea on exertion. No orthopnea. No wheezing. No cough. No stridor. No sputum production. No chest pain with respiration.  GI: No nausea. No vomiting. No diarrhea.  MSK: No joint pain or pain in any extremities-except RLE  Integumentary: No skin lesions. No pedal edema.  Neurological: No gross motor weakness. No sensory changes.  [+ ] All other systems negative  [ ] Unable to assess ROS because ________    OBJECTIVE:  ICU Vital Signs Last 24 Hrs  T(C): 37.3 (06 May 2018 19:15), Max: 37.3 (06 May 2018 19:15)  T(F): 99.1 (06 May 2018 19:15), Max: 99.1 (06 May 2018 19:15)  HR: 86 (06 May 2018 19:15) (86 - 86)  BP: 116/59 (06 May 2018 19:15) (116/59 - 119/71)  BP(mean): --  ABP: --  ABP(mean): --  RR: 18 (06 May 2018 19:15) (18 - 18)  SpO2: 96% (06 May 2018 19:15) (93% - 96%)        05-05 @ 07:01  -  05-06 @ 07:00  --------------------------------------------------------  IN: 990 mL / OUT: 0 mL / NET: 990 mL    05-06 @ 07:01  -  05-07 @ 05:05  --------------------------------------------------------  IN: 480 mL / OUT: 0 mL / NET: 480 mL      CAPILLARY BLOOD GLUCOSE          PHYSICAL EXAM: NAD in bed on O2  General: Awake, alert, oriented X 3.   HEENT: Atraumatic, normocephalic.                 Mallampatti Grade                 No nasal congestion.                No tonsillar or pharyngeal exudates.  Lymph Nodes: No palpable lymphadenopathy  Neck: No JVD. No carotid bruit.   Respiratory: Normal chest expansion                         Normal percussion                         Normal and equal air entry                         No wheeze, rhonchi or rales.  Cardiovascular: S1 S2 normal. No murmurs, rubs or gallops.   Abdomen: Soft, non-tender, non-distended. No organomegaly.  Extremities: Warm to touch. Peripheral pulse palpable. No pedal edema. Leg wound covered RLE  Skin: No rashes or skin lesions  Neurological: Motor and sensory examination equal and normal in all four extremities.  Psychiatry: Appropriate mood and affect.    HOSPITAL MEDICATIONS:  MEDICATIONS  (STANDING):  ascorbic acid 500 milliGRAM(s) Oral daily  aspirin enteric coated 81 milliGRAM(s) Oral daily  buDESOnide 160 MICROgram(s)/formoterol 4.5 MICROgram(s) Inhaler 2 Puff(s) Inhalation two times a day  calcium carbonate 1250 mG + Vitamin D (OsCal 500 + D) 1 Tablet(s) Oral three times a day  docusate sodium 100 milliGRAM(s) Oral three times a day  ferrous    sulfate 325 milliGRAM(s) Oral daily  folic acid 1 milliGRAM(s) Oral daily  lidocaine 1%/EPINEPHrine 1:100,000 Inj 10 milliLiter(s) Local Injection once  melatonin 3 milliGRAM(s) Oral at bedtime  minocycline 100 milliGRAM(s) Oral two times a day  multivitamin 1 Tablet(s) Oral daily  nystatin    Suspension 649772 Unit(s) Oral every 6 hours  nystatin Powder 1 Application(s) Topical two times a day  oxyCODONE  ER Tablet 10 milliGRAM(s) Oral every 12 hours  pantoprazole    Tablet 40 milliGRAM(s) Oral before breakfast  polyethylene glycol 3350 17 Gram(s) Oral daily  tiotropium 18 MICROgram(s) Capsule 1 Capsule(s) Inhalation daily  zinc oxide 20% Ointment 1 Application(s) Topical daily    MEDICATIONS  (PRN):  acetaminophen   Tablet 650 milliGRAM(s) Oral every 6 hours PRN For Temp greater than 38 C (100.4 F)  acetaminophen   Tablet. 650 milliGRAM(s) Oral every 6 hours PRN Mild Pain (1 - 3)  HYDROmorphone   Tablet 2 milliGRAM(s) Oral every 4 hours PRN Severe Pain (7 - 10)  lidocaine 2% Viscous 10 milliLiter(s) Swish and Spit every 6 hours PRN Mouth Sores  senna 2 Tablet(s) Oral at bedtime PRN Constipation      LABS:                        8.5    12.68 )-----------( 305      ( 06 May 2018 10:21 )             27.4           PT/INR - ( 06 May 2018 14:35 )   PT: 39.8 sec;   INR: 3.56 ratio                   MICROBIOLOGY:     RADIOLOGY:  [ ] Reviewed and interpreted by me    Point of Care Ultrasound Findings:    PFT:    EKG:

## 2018-05-07 NOTE — PROGRESS NOTE ADULT - SUBJECTIVE AND OBJECTIVE BOX
Patient is a 77y old  Female who presents with a chief complaint of fever (11 Apr 2018 14:25)      SUBJECTIVE / OVERNIGHT EVENTS:    Patient seen and examined. once again does not want me to touch wounds. states she will try to get out of bed today later..      Vital Signs Last 24 Hrs  T(C): 36.5 (07 May 2018 11:13), Max: 37.3 (06 May 2018 19:15)  T(F): 97.7 (07 May 2018 11:13), Max: 99.1 (06 May 2018 19:15)  HR: 85 (07 May 2018 11:13) (85 - 86)  BP: 138/76 (07 May 2018 11:13) (116/59 - 155/83)  BP(mean): --  RR: 18 (07 May 2018 11:13) (18 - 20)  SpO2: 95% (07 May 2018 11:13) (93% - 96%)  I&O's Summary    06 May 2018 07:01  -  07 May 2018 07:00  --------------------------------------------------------  IN: 480 mL / OUT: 0 mL / NET: 480 mL    07 May 2018 07:01  -  07 May 2018 12:14  --------------------------------------------------------  IN: 240 mL / OUT: 0 mL / NET: 240 mL        PE:  GENERAL: NAD, AAOx3, obese  HEAD:  Atraumatic, Normocephalic  EYES: EOMI, PERRLA, conjunctiva and sclera clear  NECK: Supple, No JVD  CHEST/LUNG: CTABL, No wheeze  HEART: Regular rate and rhythm; no murmur  ABDOMEN: Soft, Nontender, Nondistended; Bowel sounds present, lower abd wound  EXTREMITIES:  2+ Peripheral Pulses, right knee eschar, R upper wound and abd wound dressed  SKIN: No rashes or lesions  NEURO: No focal deficits    LABS:                        8.1    11.97 )-----------( 276      ( 07 May 2018 09:29 )             26.4     05-07    140  |  100  |  13  ----------------------------<  107<H>  3.9   |  28  |  0.77    Ca    9.1      07 May 2018 07:11      PT/INR - ( 07 May 2018 07:32 )   PT: 40.1 sec;   INR: 3.46 ratio           CAPILLARY BLOOD GLUCOSE                RADIOLOGY & ADDITIONAL TESTS:    Imaging Personally Reviewed:  [x] YES  [ ] NO    Consultant(s) Notes Reviewed:  [x] YES  [ ] NO    MEDICATIONS  (STANDING):  ascorbic acid 500 milliGRAM(s) Oral daily  aspirin enteric coated 81 milliGRAM(s) Oral daily  buDESOnide 160 MICROgram(s)/formoterol 4.5 MICROgram(s) Inhaler 2 Puff(s) Inhalation two times a day  calcium carbonate 1250 mG + Vitamin D (OsCal 500 + D) 1 Tablet(s) Oral three times a day  docusate sodium 100 milliGRAM(s) Oral three times a day  ferrous    sulfate 325 milliGRAM(s) Oral daily  folic acid 1 milliGRAM(s) Oral daily  lidocaine 1%/EPINEPHrine 1:100,000 Inj 10 milliLiter(s) Local Injection once  melatonin 3 milliGRAM(s) Oral at bedtime  minocycline 100 milliGRAM(s) Oral two times a day  multivitamin 1 Tablet(s) Oral daily  nystatin    Suspension 983433 Unit(s) Oral every 6 hours  nystatin Powder 1 Application(s) Topical two times a day  oxyCODONE  ER Tablet 10 milliGRAM(s) Oral every 12 hours  pantoprazole    Tablet 40 milliGRAM(s) Oral before breakfast  polyethylene glycol 3350 17 Gram(s) Oral daily  tiotropium 18 MICROgram(s) Capsule 1 Capsule(s) Inhalation daily  zinc oxide 20% Ointment 1 Application(s) Topical daily    MEDICATIONS  (PRN):  acetaminophen   Tablet 650 milliGRAM(s) Oral every 6 hours PRN For Temp greater than 38 C (100.4 F)  acetaminophen   Tablet. 650 milliGRAM(s) Oral every 6 hours PRN Mild Pain (1 - 3)  HYDROmorphone   Tablet 2 milliGRAM(s) Oral every 4 hours PRN Severe Pain (7 - 10)  lidocaine 2% Viscous 10 milliLiter(s) Swish and Spit every 6 hours PRN Mouth Sores  senna 2 Tablet(s) Oral at bedtime PRN Constipation      Care Discussed with Consultants/Other Providers [x] YES  [ ] NO    HEALTH ISSUES - PROBLEM Dx:  Adjustment disorder, unspecified type  Delirium due to another medical condition  Pneumonia: Pneumonia  Aortic stenosis, severe: Aortic stenosis, severe  MYAH (obstructive sleep apnea): MYAH (obstructive sleep apnea)  Obesity: Obesity  Asthma: Asthma  SOB (shortness of breath): SOB (shortness of breath)  Chronic deep vein thrombosis (DVT) of lower extremity, unspecified laterality, unspecified vein: Chronic deep vein thrombosis (DVT) of lower extremity, unspecified laterality, unspecified vein  Arthralgia of knee, unspecified laterality: Arthralgia of knee, unspecified laterality  Hyperlipidemia, unspecified hyperlipidemia type: Hyperlipidemia, unspecified hyperlipidemia type  Asthma, unspecified asthma severity, unspecified whether complicated, unspecified whether persistent: Asthma, unspecified asthma severity, unspecified whether complicated, unspecified whether persistent  Coronary artery disease involving native coronary artery of native heart without angina pectoris: Coronary artery disease involving native coronary artery of native heart without angina pectoris  Gastroesophageal reflux disease, esophagitis presence not specified: Gastroesophageal reflux disease, esophagitis presence not specified  Fever, unspecified fever cause: Fever, unspecified fever cause

## 2018-05-07 NOTE — PROGRESS NOTE ADULT - SUBJECTIVE AND OBJECTIVE BOX
pain controlled, no new events, afebrile    abdominal wound - dressed by plastics, patient deferred exam by me at this time of this owund  RLE  thigh with skin and fat necrosis, stable, no surrounding erythema, dressed by prs/wound care  surgical wound intact with stable eschar, some separation of eschar medially from skin, no exudate, dressed by prs/wound care  5/5 ta/ehl/gcs  silt l4-s1  2+ dp pulse

## 2018-05-07 NOTE — PROGRESS NOTE ADULT - ASSESSMENT
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 She now completed  6 weeks of dapto and now on  minocycline She was treated for pneumonia. She had hematoma of right thigh and abdomen wall with fat and skin necrosis so had debridement done.   wbc is trending down     Plan:   continue minocycline as planned   wound care as per plastics   supportive care as per medicine

## 2018-05-07 NOTE — PROGRESS NOTE ADULT - ASSESSMENT
77 year old female who was admitted several weeks ago with aspiration pneumonia and fever with right knee pain. She also had proximal thigh swelling and there was a hematoma of the leg as well. She has a history of PMR on steroids. She also has a history of tavr, AICD andf remote bilateral TKR and was found to have strep bacteremia and a ERIKA was negative and she also had a eschar of the right knee as well. She was placed on iv antibiotics and I understand that she will be completing the antibiotics, daptomycin on this date. She told me that she had anemia in the distant past and was placed on antibiotics. She is being seen by the plastics service as well. A ct of th femur was not remarkable for infection and a ct of the chest done several weeks ago showed bibasilar PNA. She was also noted to have a left renal lesion that is unchanged now since 12/2016.     The consult was requested to see if there is anything we can do for her anemia. The counts on the day of this admission was white cell count of 9.71 hemoglobin 8.4 hematocrit 26.4 MCV 86 and MCHC 31.8 and platelet count of 303821 the diff count was 74 polys 15 lymphs and 7 monos the bun was 9 and the creatinine was 0.6     THe anemia as described is likely from anemia of inflammation. I would send off serum iron tibc and retic count and ferritin with a guaiac. She may be a candidate for epo to reduce transfusion needs, will follow.     Would send off iron studies, serum iron tibc and ferritin  The hemoglobin on 4/7 was 8.1 and iron studies were ordered for the am by me. She may be a candidate for epo if transfusions become a problem.

## 2018-05-07 NOTE — CHART NOTE - NSCHARTNOTEFT_GEN_A_CORE
Pt with fever for the last 2 nights. Had T102 on Saturday and 101 last night.   CXR done today with clear lungs. Prelim Blood & UCS negative. D/w ID MD, Dr Krause  re: any additional antibiotic coverage. He d/w Dr Jain. Per MD, no additional antibiotic coverage at this  time.  Pt to be started on Gm Neg coverage (Zosyn or Cefepime). Pt will be re-evaluated by Dr Spencer Tomorrow -   possible scan to evaluate further.

## 2018-05-07 NOTE — PROGRESS NOTE ADULT - ATTENDING COMMENTS
Patient seen and examined  Family meeting yesterday to discuss plan for the multiple wounds    After long discussion the decision reached to try to ovoid surgery and continue with local wound care.  The goal of care is to get the thigh dressing stable enough for a VAC dressing and cont wet to dry to both abdominal and knee wounds    Patient had multiple debridement at bedside.    Abdominal wound still has some fibrinous ecaudate will cont wet to dry    Medial thigh wound is clean and ready for a VAC dressing -- VAC wound care team called -- They will evaluate patient if she is a candidate for a VAC veraflo with CLEANSE CHOICE™ Dressing or V.A.C. VERAFLO™ Large Dressing    Left knee wound will cont wet to dry -- Patient may require surgical intervention in the future Patient seen and examined  Family meeting yesterday to discuss plan for the multiple wounds    After long discussion the decision reached to try to ovoid surgery and continue with local wound care.  The goal of care is to get the thigh dressing stable enough for a VAC dressing and cont wet to dry to both abdominal and knee wounds    Patient had multiple debridement at bedside.    Abdominal wound still has some fibrinous ecaudate will cont wet to dry    Medial thigh wound is clean and ready for a VAC dressing -- VAC wound care team called -- They will evaluate patient if she is a candidate for a VAC veraflo with CLEANSE CHOICE™ Dressing or V.A.C. VERAFLO™ Large Dressing    Right knee wound will cont wet to dry -- Patient may require surgical intervention in the future

## 2018-05-07 NOTE — PROGRESS NOTE ADULT - ATTENDING COMMENTS
as above-  Pulm relatively stable-atelectasis, prior PE, asthma, cardiac Dz  Inhaler regimen as outlined above  DVT rx /prophylaxis  ID follow up of ABX  completed 5/5--then minocycline  (debridement done 5/5)   PT evaluation and Psych evaluation    ortho/plastics follow up--?AKA-if conservative rx-not successful  Rehab pending    Everett Kumar MD-Pulmonary   [phone] as above-  Pulm relatively stable-atelectasis, prior PE, asthma, cardiac Dz  Inhaler regimen as outlined above  DVT rx /prophylaxis  ID follow up of ABX  completed 5/5--then minocycline  (debridement done 5/5)--following WBC   PT evaluation and Psych evaluation    ortho/plastics follow up--?AKA-if conservative rx-not successful  Rehab pending    Everett Kumar MD-Pulmonary   [phone]

## 2018-05-07 NOTE — PROGRESS NOTE ADULT - SUBJECTIVE AND OBJECTIVE BOX
CC: f/u for mrsa knee infection    Patient reports she is very comfortable in bed     REVIEW OF SYSTEMS:  All other review of systems negative (Comprehensive ROS)    Antimicrobials Day #  :1  minocycline 100 milliGRAM(s) Oral two times a day  nystatin    Suspension 449648 Unit(s) Oral every 6 hours    Other Medications Reviewed    Vital Signs Last 24 Hrs  T(C): 36.5 (07 May 2018 11:13), Max: 37.3 (06 May 2018 19:15)  T(F): 97.7 (07 May 2018 11:13), Max: 99.1 (06 May 2018 19:15)  HR: 85 (07 May 2018 11:13) (85 - 86)  BP: 138/76 (07 May 2018 11:13) (116/59 - 155/83)  BP(mean): --  RR: 18 (07 May 2018 11:13) (18 - 20)  SpO2: 95% (07 May 2018 11:13) (93% - 96%)    PHYSICAL EXAM:  General: alert, no acute distress  Eyes:  anicteric, no conjunctival injection, no discharge  Oropharynx: no lesions or injection 	  Neck: supple, without adenopathy  Lungs: clear to auscultation  Heart: regular rate and rhythm; no murmur, rubs or gallops  Abdomen: soft, nondistended, nontender, without mass or organomegaly. wound packed  Skin: no lesions  Extremities: right thigh wound debrided and packed, right knee eschar  Neurologic: alert, oriented, moves all extremities    LAB RESULTS:                        8.1    11.97 )-----------( 276      ( 07 May 2018 09:29 )             26.4                           8.5    12.68 )-----------( 305      ( 06 May 2018 10:21 )             27.4     Culture - Other (04.27.18 @ 18:18)    Specimen Source: Skin abdominal wound    Culture Results:   No growth at 48 hours      RADIOLOGY REVIEWED:    < from: CT Femur No Cont, Right (04.19.18 @ 19:06) >  IMPRESSION:  1.  Patient status post explantation of right total knee arthroplasty and   washout. Antibiotic cement and intramedullary ruthann are identified in the   right knee joint. No evidence of acute hardware complication.  2.  No focal, drainable fluid collections are identified on today's   examination.  3.  Nonspecific subcutaneous edema involving the patient's pannus and   subcutaneous fat of the medial thigh.      < end of copied text >

## 2018-05-07 NOTE — PROGRESS NOTE ADULT - ASSESSMENT
no plan for orthopedic surgical intervention per family and patient preference  local wound care per plastics and wound care team, appreciate local wound debridements at beside by prs, patient prefers to avoid return to OR at this time for wound debridments, pending VAC placement  now on PO abx per ID team, has had small increase in WBC without fevers, likely 2/2 beside debridement  encourage patient to spend majority of bed oob in bedside chair as able, has not been OOB in some time, please have PT return on daily basis to try if posisble  PT to help mobilize, she must remain 50% wb on the RLE and NO KNEE MOTION allowed, knee immobilizer if oob  coumadin, inr goal 2-3, now supratherapeutic  continue to optimize nutrition  continue to involve psych	  appreciate hematology consult for chronic anemia, despite chronic anemia which she is tolerating, would prefer hb>12 if possible to improve oxygen supply for wound healing    	Nate Bass MD  Attending Orthopedic Surgeon

## 2018-05-07 NOTE — PROGRESS NOTE ADULT - SUBJECTIVE AND OBJECTIVE BOX
Pt is a 77 year old female who was admitted several weeks ago with aspiration pneumonia and fever with right knee pain. She also had proximal thigh swelling and there was a hematoma of the leg as well. She has a history of PMR on steroids. She also has a history of tavr, AICD andf remote bilateral TKR and was found to have strep bacteremia and a ERIKA was negative and she also had a eschar of the right knee as well. She was placed on iv antibiotics and I understand that she will be completing the antibiotics, daptomycin on this date. She told me that she had anemia in the distant past and was placed on antibiotics. She is being seen by the plastics service as well. A ct of th femur was not remarkable for infection and a ct of the chest done several weeks ago showed bibasilar PNA. She was also noted to have a left renal lesion that is unchanged now since 12/2016.     The consult was requested to see if there is anything we can do for her anemia. The counts on the day of this admission was white cell count of 9.71 hemoglobin 8.4 hematocrit 26.4 MCV 86 and MCHC 31.8 and platelet count of [id number] the diff count was 74 polys 15 lymphs and 7 monos the bun was 9 and the creatinine was 0.6     5/4 the pt is stable and the iron studies are still pending at this time. The hemoglobin was 8.4. 5/7 the pt was seen and the notes over charu last few days. The pt will in the am have ferritin and iron studies sent off.  PE weak appearing with female and needed help to do normal daily activities large eschar as described on the lower leg and at the time of my arrival, the wound vac was in place and she was being cared for by the staff.  pmh as above in the body of the report  FH/SH no  contrib  MEDICATIONS  (STANDING):  ascorbic acid 500 milliGRAM(s) Oral daily  aspirin enteric coated 81 milliGRAM(s) Oral daily  buDESOnide 160 MICROgram(s)/formoterol 4.5 MICROgram(s) Inhaler 2 Puff(s) Inhalation two times a day  calcium carbonate 1250 mG + Vitamin D (OsCal 500 + D) 1 Tablet(s) Oral three times a day  docusate sodium 100 milliGRAM(s) Oral three times a day  ferrous    sulfate 325 milliGRAM(s) Oral daily  folic acid 1 milliGRAM(s) Oral daily  lidocaine 1%/EPINEPHrine 1:100,000 Inj 10 milliLiter(s) Local Injection once  melatonin 3 milliGRAM(s) Oral at bedtime  minocycline 100 milliGRAM(s) Oral two times a day  multivitamin 1 Tablet(s) Oral daily  nystatin    Suspension 570966 Unit(s) Oral every 6 hours  nystatin Powder 1 Application(s) Topical two times a day  oxyCODONE  ER Tablet 10 milliGRAM(s) Oral every 12 hours  pantoprazole    Tablet 40 milliGRAM(s) Oral before breakfast  polyethylene glycol 3350 17 Gram(s) Oral daily  tiotropium 18 MICROgram(s) Capsule 1 Capsule(s) Inhalation daily  zinc oxide 20% Ointment 1 Application(s) Topical daily    MEDICATIONS  (PRN):  acetaminophen   Tablet 650 milliGRAM(s) Oral every 6 hours PRN For Temp greater than 38 C (100.4 F)  acetaminophen   Tablet. 650 milliGRAM(s) Oral every 6 hours PRN Mild Pain (1 - 3)  HYDROmorphone   Tablet 2 milliGRAM(s) Oral every 4 hours PRN Severe Pain (7 - 10)  lidocaine 2% Viscous 10 milliLiter(s) Swish and Spit every 6 hours PRN Mouth Sores  senna 2 Tablet(s) Oral at bedtime PRN Constipation  Vital Signs Last 24 Hrs  T(C): 36.9 (03 May 2018 06:10), Max: 37.1 (03 May 2018 01:07)  T(F): 98.4 (03 May 2018 06:10), Max: 98.7 (03 May 2018 01:07)  HR: 81 (03 May 2018 06:10) (79 - 83)  BP: 137/67 (03 May 2018 06:10) (136/80 - 143/74)  BP(mean): --  RR: 18 (03 May 2018 06:10) (18 - 20)  SpO2: 94% (03 May 2018 06:10) (94% - 96%)    Vital Signs Last 24 Hrs  T(C): 36.5 (07 May 2018 11:13), Max: 37.3 (06 May 2018 19:15)  T(F): 97.7 (07 May 2018 11:13), Max: 99.1 (06 May 2018 19:15)  HR: 85 (07 May 2018 11:13) (85 - 86)  BP: 138/76 (07 May 2018 11:13) (116/59 - 155/83)  BP(mean): --  RR: 18 (07 May 2018 11:13) (18 - 20)  SpO2: 95% (07 May 2018 11:13) (93% - 96%)                    8.4    9.71  )-----------( 318      ( 03 May 2018 08:01 )             26.4   bun 9 creatinine 0.66

## 2018-05-07 NOTE — PHYSICAL THERAPY INITIAL EVALUATION ADULT - PRECAUTIONS/LIMITATIONS, REHAB EVAL
S/p  3/23/2018 R knee explantation of previously placed articulating antibiotic spacer, irrigation & debridement of the knee, placement of static antibiotic spacer, w/ a Plastic Surgery soft tissue complex wound closure. At rehab pt was developing worsening weakness. Overall poor appetite. Developed fever/chills yesterday & taken to ED for further work up. Seen by ortho & plastics in ED w/ fever to 103.5/fall precautions

## 2018-05-07 NOTE — PHYSICAL THERAPY INITIAL EVALUATION ADULT - GENERAL OBSERVATIONS, REHAB EVAL
Patient received supine in NAD, call bell in reach, HHA at bedside, DSD to R medial thigh
Patient received supine in NAD, 2L/min O2 via NC, RLE in KI, HHA at bedside

## 2018-05-07 NOTE — CHART NOTE - NSCHARTNOTEFT_GEN_A_CORE
Consult received for nutrition support. Patient noted with decreased PO intake, crying she wants to die because she is in pain. HHA at bedside, assisting with meals , menus. Patient dislikes chopped food(Mechanical soft diet recommended by SLP evaluation).   Source: Patient [x ]    Family [ ]     other [x ]  HHA    Diet : Mechanical soft, with Nepro 2x/day      Patient reports [ ] nausea  [ ] vomiting [ ] diarrhea [ ] constipation  [ ]chewing problems [ ] swallowing issues  [ ] other:      PO intake:  < 50% [ ] 50-75% [ ]   % [ ]  other :     Source for PO intake [x] Patient [ ] family [ ] chart [x ] staff [x ] other  HHA   Enteral /Parenteral Nutrition:       Current Weight:   % Weight Change    Pertinent Medications: MEDICATIONS  (STANDING):  ascorbic acid 500 milliGRAM(s) Oral daily  aspirin enteric coated 81 milliGRAM(s) Oral daily  buDESOnide 160 MICROgram(s)/formoterol 4.5 MICROgram(s) Inhaler 2 Puff(s) Inhalation two times a day  calcium carbonate 1250 mG + Vitamin D (OsCal 500 + D) 1 Tablet(s) Oral three times a day  docusate sodium 100 milliGRAM(s) Oral three times a day  ferrous    sulfate 325 milliGRAM(s) Oral daily  folic acid 1 milliGRAM(s) Oral daily  lidocaine 1%/EPINEPHrine 1:100,000 Inj 10 milliLiter(s) Local Injection once  melatonin 3 milliGRAM(s) Oral at bedtime  minocycline 100 milliGRAM(s) Oral two times a day  multivitamin 1 Tablet(s) Oral daily  nystatin    Suspension 633817 Unit(s) Oral every 6 hours  nystatin Powder 1 Application(s) Topical two times a day  oxyCODONE  ER Tablet 10 milliGRAM(s) Oral every 12 hours  pantoprazole    Tablet 40 milliGRAM(s) Oral before breakfast  polyethylene glycol 3350 17 Gram(s) Oral daily  tiotropium 18 MICROgram(s) Capsule 1 Capsule(s) Inhalation daily  zinc oxide 20% Ointment 1 Application(s) Topical daily    MEDICATIONS  (PRN):  acetaminophen   Tablet 650 milliGRAM(s) Oral every 6 hours PRN For Temp greater than 38 C (100.4 F)  acetaminophen   Tablet. 650 milliGRAM(s) Oral every 6 hours PRN Mild Pain (1 - 3)  HYDROmorphone   Tablet 2 milliGRAM(s) Oral every 4 hours PRN Severe Pain (7 - 10)  lidocaine 2% Viscous 10 milliLiter(s) Swish and Spit every 6 hours PRN Mouth Sores  senna 2 Tablet(s) Oral at bedtime PRN Constipation    Pertinent Labs:  05-07 Na140 mmol/L Glu 107 mg/dL<H> K+ 3.9 mmol/L Cr  0.77 mg/dL BUN 13 mg/dL      Skin:     Estimated Needs:   [ ] no change since previous assessment  [ ] recalculated:       Previous Nutrition Diagnosis:     [ ] Inadequate Energy Intake [ ]Inadequate Oral Intake [ ] Excessive Energy Intake     [ ] Underweight [ ] Increased Nutrient Needs [ ] Overweight/Obesity     [ ] Altered GI Function [ ] Unintended Weight Loss [ ] Food & Nutrition Related Knowledge Deficit [ ] Malnutrition          Nutrition Diagnosis is [ ] ongoing  [ ] resolved [ ] not applicable          New Nutrition Diagnosis: [ ] not applicable    [ ] Inadequate Protein Energy Intake [ ]Inadequate Oral Intake [ ] Excessive Energy Intake     [ ] Underweight [ ] Increased Nutrient Needs [ ] Overweight/Obesity     [ ] Altered GI Function [ ] Unintended Weight Loss [ ] Food & Nutrition Related Knowledge Deficit[ ] Limited Adherence to nutrition related recommendations [ ] Malnutrition  [ ] other: Free text       Related to:      As evidenced by:      Interventions:     Recommend    [ ] Change Diet To:    [ ] Nutrition Supplement    [ ] Nutrition Support    [ ] Other:        Monitoring and Evaluation:     [ ] PO intake [ ] Tolerance to diet prescription [ ] weights [ ] follow up per protocol    [ ] other: Consult received for nutrition support. Patient noted with decreased PO intake, crying she wants to die because she is in pain. HHA at bedside, assisting with meals , menus. Patient dislikes chopped food(Mechanical soft diet recommended by SLP evaluation).   Source: Patient [x ]    Family [ ]     other [x ]  HHA    Diet : Mechanical soft, with Nepro 2x/day      Patient reports [x ] swallowing issues  [x ] other: no appetite, depression; patient noted crying  and stating "I want to die because of the pain".  RN informed     PO intake:  < 50% [x ] 50-75% [ ]   % [ x]  other :patient drink 1-2 cans nepro/day     Source for PO intake [x] Patient [ ] family [ ] chart [x ] staff [x ] other  HHA    Enteral /Parenteral Nutrition: NA    Current Weight: 108kg   Weight Change  admit dosing weight 117kg    Pertinent Medications: MEDICATIONS  (STANDING):  ascorbic acid 500 milliGRAM(s) Oral daily  aspirin enteric coated 81 milliGRAM(s) Oral daily  buDESOnide 160 MICROgram(s)/formoterol 4.5 MICROgram(s) Inhaler 2 Puff(s) Inhalation two times a day  calcium carbonate 1250 mG + Vitamin D (OsCal 500 + D) 1 Tablet(s) Oral three times a day  docusate sodium 100 milliGRAM(s) Oral three times a day  ferrous    sulfate 325 milliGRAM(s) Oral daily  folic acid 1 milliGRAM(s) Oral daily  lidocaine 1%/EPINEPHrine 1:100,000 Inj 10 milliLiter(s) Local Injection once  melatonin 3 milliGRAM(s) Oral at bedtime  minocycline 100 milliGRAM(s) Oral two times a day  multivitamin 1 Tablet(s) Oral daily  nystatin    Suspension 810915 Unit(s) Oral every 6 hours  nystatin Powder 1 Application(s) Topical two times a day  oxyCODONE  ER Tablet 10 milliGRAM(s) Oral every 12 hours  pantoprazole    Tablet 40 milliGRAM(s) Oral before breakfast  polyethylene glycol 3350 17 Gram(s) Oral daily  tiotropium 18 MICROgram(s) Capsule 1 Capsule(s) Inhalation daily  zinc oxide 20% Ointment 1 Application(s) Topical daily    MEDICATIONS  (PRN):  acetaminophen   Tablet 650 milliGRAM(s) Oral every 6 hours PRN For Temp greater than 38 C (100.4 F)  acetaminophen   Tablet. 650 milliGRAM(s) Oral every 6 hours PRN Mild Pain (1 - 3)  HYDROmorphone   Tablet 2 milliGRAM(s) Oral every 4 hours PRN Severe Pain (7 - 10)  lidocaine 2% Viscous 10 milliLiter(s) Swish and Spit every 6 hours PRN Mouth Sores  senna 2 Tablet(s) Oral at bedtime PRN Constipation    Pertinent Labs:  05-07 Na140 mmol/L Glu 107 mg/dL<H> K+ 3.9 mmol/L Cr  0.77 mg/dL BUN 13 mg/dL      Skin: multiple wounds, R thigh MRSA infected with wound vac in place, family and patient refuses AKA as recommended by ortho; S/p abdominal wound debridement    Estimated Needs:   [x ] no change since previous assessment  [ ] recalculated:       Previous Nutrition Diagnosis:          [x ] Increased Nutrient Needs            Nutrition Diagnosis is [x] ongoing  [ ] resolved [ ] not applicable          New Nutrition Diagnosis: [x ] not applicable        Recommend    [ ] Change Diet To: continue mechanical soft diet (resulting from MBS test)    [x ] Nutrition Supplement  continue Nepro 2x/day        [ x] Other:  patient encouraged strongly to consume nepro 2x/day; modify menu to suit preferences       Monitoring and Evaluation:     [ ] PO intake [x ] Tolerance to diet prescription [ ] weights [ x] follow up per protocol    [x ] other: suggestions made for preferences within diet restrictions, patient likes macaroni and cheese, cheeseburger, chicken salad. Discussed with patient and HHA.

## 2018-05-07 NOTE — PHYSICAL THERAPY INITIAL EVALUATION ADULT - MODALITIES TREATMENT COMMENTS
Patient received supine in NAD, call bell in reach, HHA at bedside, DSD to R medial thigh. Pt c/o pain during dressing change & VAC application, RN/Vani aware pt medicated for pain before PT WC present for PT WC Eval. WOund rec'ed C/D/I, no odor, purulence, erythema. R medial thigh surgical wound s/p bedside debridement 5/6/18. Wound measuring 16cm x 8cm x 1.5cm. Cleansed w/ NS, cavilon to periwound, adaptic touch to wound base to promote decreased pain w/ future dressing changes, black granufoam, good seal 125mmHg, continuous. Pt left supine in [place] bed, NAD, call bell in reach, HHA at bedside, purposeful proactive rounding took place, RN/Vani aware.

## 2018-05-08 LAB
FERRITIN SERPL-MCNC: 437 NG/ML — HIGH (ref 15–150)
INR BLD: 3.23 RATIO — HIGH (ref 0.88–1.16)
IRON SATN MFR SERPL: 15 % — SIGNIFICANT CHANGE UP (ref 14–50)
IRON SATN MFR SERPL: 15 UG/DL — LOW (ref 30–160)
PROTHROM AB SERPL-ACNC: 37.4 SEC — HIGH (ref 10–13.1)
TIBC SERPL-MCNC: 101 UG/DL — LOW (ref 220–430)
UIBC SERPL-MCNC: 86 UG/DL — LOW (ref 110–370)

## 2018-05-08 PROCEDURE — 99232 SBSQ HOSP IP/OBS MODERATE 35: CPT

## 2018-05-08 RX ORDER — OXYCODONE HYDROCHLORIDE 5 MG/1
5 TABLET ORAL EVERY 12 HOURS
Qty: 0 | Refills: 0 | Status: DISCONTINUED | OUTPATIENT
Start: 2018-05-08 | End: 2018-05-08

## 2018-05-08 RX ORDER — SODIUM HYPOCHLORITE 0.125 %
1 SOLUTION, NON-ORAL MISCELLANEOUS THREE TIMES A DAY
Qty: 0 | Refills: 0 | Status: DISCONTINUED | OUTPATIENT
Start: 2018-05-08 | End: 2018-05-15

## 2018-05-08 RX ORDER — LACTULOSE 10 G/15ML
10 SOLUTION ORAL ONCE
Qty: 0 | Refills: 0 | Status: COMPLETED | OUTPATIENT
Start: 2018-05-08 | End: 2018-05-08

## 2018-05-08 RX ORDER — HYDROMORPHONE HYDROCHLORIDE 2 MG/ML
2 INJECTION INTRAMUSCULAR; INTRAVENOUS; SUBCUTANEOUS EVERY 4 HOURS
Qty: 0 | Refills: 0 | Status: DISCONTINUED | OUTPATIENT
Start: 2018-05-08 | End: 2018-05-15

## 2018-05-08 RX ORDER — OXYCODONE HYDROCHLORIDE 5 MG/1
10 TABLET ORAL
Qty: 0 | Refills: 0 | Status: DISCONTINUED | OUTPATIENT
Start: 2018-05-08 | End: 2018-05-08

## 2018-05-08 RX ADMIN — Medication 1 TABLET(S): at 14:42

## 2018-05-08 RX ADMIN — Medication 500000 UNIT(S): at 06:28

## 2018-05-08 RX ADMIN — Medication 100 MILLIGRAM(S): at 06:25

## 2018-05-08 RX ADMIN — MINOCYCLINE HYDROCHLORIDE 100 MILLIGRAM(S): 45 TABLET, EXTENDED RELEASE ORAL at 17:50

## 2018-05-08 RX ADMIN — BUDESONIDE AND FORMOTEROL FUMARATE DIHYDRATE 2 PUFF(S): 160; 4.5 AEROSOL RESPIRATORY (INHALATION) at 06:28

## 2018-05-08 RX ADMIN — ZINC OXIDE 1 APPLICATION(S): 200 OINTMENT TOPICAL at 14:42

## 2018-05-08 RX ADMIN — Medication 1 TABLET(S): at 21:22

## 2018-05-08 RX ADMIN — BUDESONIDE AND FORMOTEROL FUMARATE DIHYDRATE 2 PUFF(S): 160; 4.5 AEROSOL RESPIRATORY (INHALATION) at 17:50

## 2018-05-08 RX ADMIN — Medication 325 MILLIGRAM(S): at 14:42

## 2018-05-08 RX ADMIN — Medication 500 MILLIGRAM(S): at 14:41

## 2018-05-08 RX ADMIN — Medication 500000 UNIT(S): at 14:42

## 2018-05-08 RX ADMIN — MINOCYCLINE HYDROCHLORIDE 100 MILLIGRAM(S): 45 TABLET, EXTENDED RELEASE ORAL at 06:26

## 2018-05-08 RX ADMIN — Medication 81 MILLIGRAM(S): at 14:42

## 2018-05-08 RX ADMIN — Medication 3 MILLIGRAM(S): at 21:22

## 2018-05-08 RX ADMIN — OXYCODONE HYDROCHLORIDE 10 MILLIGRAM(S): 5 TABLET ORAL at 06:24

## 2018-05-08 RX ADMIN — LACTULOSE 10 GRAM(S): 10 SOLUTION ORAL at 17:48

## 2018-05-08 RX ADMIN — POLYETHYLENE GLYCOL 3350 17 GRAM(S): 17 POWDER, FOR SOLUTION ORAL at 14:42

## 2018-05-08 RX ADMIN — Medication 1 MILLIGRAM(S): at 14:42

## 2018-05-08 RX ADMIN — Medication 1 TABLET(S): at 06:26

## 2018-05-08 RX ADMIN — HYDROMORPHONE HYDROCHLORIDE 2 MILLIGRAM(S): 2 INJECTION INTRAMUSCULAR; INTRAVENOUS; SUBCUTANEOUS at 17:48

## 2018-05-08 RX ADMIN — NYSTATIN CREAM 1 APPLICATION(S): 100000 CREAM TOPICAL at 14:42

## 2018-05-08 RX ADMIN — Medication 100 MILLIGRAM(S): at 21:22

## 2018-05-08 RX ADMIN — HYDROMORPHONE HYDROCHLORIDE 2 MILLIGRAM(S): 2 INJECTION INTRAMUSCULAR; INTRAVENOUS; SUBCUTANEOUS at 18:34

## 2018-05-08 RX ADMIN — TIOTROPIUM BROMIDE 1 CAPSULE(S): 18 CAPSULE ORAL; RESPIRATORY (INHALATION) at 14:43

## 2018-05-08 RX ADMIN — Medication 100 MILLIGRAM(S): at 14:42

## 2018-05-08 RX ADMIN — PANTOPRAZOLE SODIUM 40 MILLIGRAM(S): 20 TABLET, DELAYED RELEASE ORAL at 06:27

## 2018-05-08 RX ADMIN — Medication 500000 UNIT(S): at 17:50

## 2018-05-08 NOTE — PROGRESS NOTE ADULT - SUBJECTIVE AND OBJECTIVE BOX
Pt is a 77 year old female who was admitted several weeks ago with aspiration pneumonia and fever with right knee pain. She also had proximal thigh swelling and there was a hematoma of the leg as well. She has a history of PMR on steroids. She also has a history of tavr, AICD andf remote bilateral TKR and was found to have strep bacteremia and a ERIKA was negative and she also had a eschar of the right knee as well. She was placed on iv antibiotics and I understand that she will be completing the antibiotics, daptomycin on this date. She told me that she had anemia in the distant past and was placed on antibiotics. She is being seen by the plastics service as well. A ct of th femur was not remarkable for infection and a ct of the chest done several weeks ago showed bibasilar PNA. She was also noted to have a left renal lesion that is unchanged now since 12/2016.     The consult was requested to see if there is anything we can do for her anemia. The counts on the day of this admission was white cell count of 9.71 hemoglobin 8.4 hematocrit 26.4 MCV 86 and MCHC 31.8 and platelet count of 470944 the diff count was 74 polys 15 lymphs and 7 monos the bun was 9 and the creatinine was 0.6     5/4 the pt is stable and the iron studies are still pending at this time. The hemoglobin was 8.4. 5/7 the pt was seen and the notes over charu last few days. The pt will in the am have ferritin and iron studies sent off. 5/8 the ferritin came back as over 300 and therefore she is not iron deficient and the anemia is consistent with chronic disease. The use of epogen might be considered here if hemoglobin drops and can be implemented to reduce blood transfusions.  PE weak appearing with female and needed help to do normal daily activities large eschar as described on the lower leg and at the time of my arrival, the wound vac was in place and she was being cared for by the staff.  5/8 the ferritin came back at over 300 and therefore she is not iron deficient and epo can be considered if the hemoglobin drops and is in need of blood support.  pmh as above in the body of the report  FH/SH no  contrib  MEDICATIONS  (STANDING):  ascorbic acid 500 milliGRAM(s) Oral daily  aspirin enteric coated 81 milliGRAM(s) Oral daily  buDESOnide 160 MICROgram(s)/formoterol 4.5 MICROgram(s) Inhaler 2 Puff(s) Inhalation two times a day  calcium carbonate 1250 mG + Vitamin D (OsCal 500 + D) 1 Tablet(s) Oral three times a day  docusate sodium 100 milliGRAM(s) Oral three times a day  ferrous    sulfate 325 milliGRAM(s) Oral daily  folic acid 1 milliGRAM(s) Oral daily  lidocaine 1%/EPINEPHrine 1:100,000 Inj 10 milliLiter(s) Local Injection once  melatonin 3 milliGRAM(s) Oral at bedtime  minocycline 100 milliGRAM(s) Oral two times a day  multivitamin 1 Tablet(s) Oral daily  nystatin    Suspension 442182 Unit(s) Oral every 6 hours  nystatin Powder 1 Application(s) Topical two times a day  oxyCODONE  ER Tablet 10 milliGRAM(s) Oral every 12 hours  pantoprazole    Tablet 40 milliGRAM(s) Oral before breakfast  polyethylene glycol 3350 17 Gram(s) Oral daily  tiotropium 18 MICROgram(s) Capsule 1 Capsule(s) Inhalation daily  zinc oxide 20% Ointment 1 Application(s) Topical daily  Vital Signs Last 24 Hrs  T(C): 36.9 (08 May 2018 06:20), Max: 36.9 (08 May 2018 06:20)  T(F): 98.5 (08 May 2018 06:20), Max: 98.5 (08 May 2018 06:20)  HR: 77 (08 May 2018 06:20) (77 - 80)  BP: 110/65 (08 May 2018 06:20) (110/65 - 144/78)  BP(mean): --  RR: 19 (08 May 2018 06:20) (18 - 19)  SpO2: 95% (08 May 2018 06:20) (95% - 95%)  MEDICATIONS  (PRN):  acetaminophen   Tablet 650 milliGRAM(s) Oral every 6 hours PRN For Temp greater than 38 C (100.4 F)  acetaminophen   Tablet. 650 milliGRAM(s) Oral every 6 hours PRN Mild Pain (1 - 3)  HYDROmorphone   Tablet 2 milliGRAM(s) Oral every 4 hours PRN Severe Pain (7 - 10)  lidocaine 2% Viscous 10 milliLiter(s) Swish and Spit every 6 hours PRN Mouth Sores  senna 2 Tablet(s) Oral at bedtime PRN Constipation  Vital Signs Last 24 Hrs  T(C): 36.9 (03 May 2018 06:10), Max: 37.1 (03 May 2018 01:07)  T(F): 98.4 (03 May 2018 06:10), Max: 98.7 (03 May 2018 01:07)  HR: 81 (03 May 2018 06:10) (79 - 83)  BP: 137/67 (03 May 2018 06:10) (136/80 - 143/74)  BP(mean): --  RR: 18 (03 May 2018 06:10) (18 - 20)  SpO2: 94% (03 May 2018 06:10) (94% - 96%)    Vital Signs Last 24 Hrs  T(C): 36.5 (07 May 2018 11:13), Max: 37.3 (06 May 2018 19:15)  T(F): 97.7 (07 May 2018 11:13), Max: 99.1 (06 May 2018 19:15)  HR: 85 (07 May 2018 11:13) (85 - 86)  BP: 138/76 (07 May 2018 11:13) (116/59 - 155/83)  BP(mean): --  RR: 18 (07 May 2018 11:13) (18 - 20)  SpO2: 95% (07 May 2018 11:13) (93% - 96%)          bun 9 creatinine 0.66  CBC Full  -  ( 07 May 2018 09:29 )  WBC Count : 11.97 K/uL  Hemoglobin : 8.1 g/dL  Hematocrit : 26.4 %  Platelet Count - Automated : 276 K/uL  Mean Cell Volume : 90.1 fl  Mean Cell Hemoglobin : 27.6 pg  Mean Cell Hemoglobin Concentration : 30.7 gm/dL  Auto Neutrophil # : x  Auto Lymphocyte # : x  Auto Monocyte # : x  Auto Eosinophil # : x  Auto Basophil # : x  Auto Neutrophil % : x  Auto Lymphocyte % : x  Auto Monocyte % : x  Auto Eosinophil % : x  Auto Basophil % : x

## 2018-05-08 NOTE — PROGRESS NOTE ADULT - ASSESSMENT
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 cultures NTD. fever resolved.     # Recent septic Rt TKR - abx spacer exchanged and plastics complex wound closure  # PMR on chronic steroids  # thigh wound,   # right knee eschar, right thigh necrosis and abd wound skin breakdown    Plan:  appreciate plastics discussion with family, cont local wound care as family declining surgery  sp multiple debridements, cont wet to dry dressing abd and knee wounds TID  wound vac for thigh wound  remains afebrile, cont suppressive minocycline, appreciate ID recs  appreciate psych eval  appreciate ortho recs  heme follow up  will cont coumadin and asa  pain control on oxy IR 10mg BID and dilaudid 2mg q4 prn  PT OOB encourage ambulation  bowel regimen    plan of care dw patient 78yo f PMH including HLD, GERD, Anxiety, Anemia, CAD s/p HERBERT, severe AS (s/p TAVR & PPM 12/17 [employer] Model [id number]), 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 cultures NTD. fever resolved.     # Recent septic Rt TKR - abx spacer exchanged and plastics complex wound closure  # PMR on chronic steroids  # thigh wound,   # right knee eschar, right thigh necrosis and abd wound skin breakdown    Plan:  appreciate plastics discussion with family, cont local wound care as family declining surgery  sp multiple debridements, cont wet to dry dressing abd and knee wounds TID  wound vac for thigh wound  remains afebrile, cont suppressive minocycline, appreciate ID recs  appreciate psych eval  appreciate ortho recs  heme follow up  will cont coumadin and asa  pain control on oxy IR 10mg BID and dilaudid 2mg q4 prn  PT OOB encourage ambulation, dw patient importance of turning and trying to get out of bed  bowel regimen    plan of care dw patient

## 2018-05-08 NOTE — PROGRESS NOTE ADULT - ASSESSMENT
Patient with infected right tkr s/p rudy, spacer for strept infection, had poor wound healing so returned 3 months after and had spacer exchange and complex wound closure with mrsa infection found. She is about 2 weeks into dapto and returns with fever, leukocytosis, ongoing pain and eschar of right knee wound and what appears to be a hematoma of the right thigh. INR has been up so that is good reason for the thigh findings. Must consider infected hematoma possible. UTI is possible. Pneumonia is possible given basilar rales. retroperitoneal hematoma possible .  PMR decompensation or adrenal insufficiency a consideration at well.    Pulmonary stable relative to prior admits --  wound care in progress  Completed IV ABX-5/5 -42 days  Debridement done-5/5 Patient with infected right tkr s/p rudy, spacer for strept infection, had poor wound healing so returned 3 months after and had spacer exchange and complex wound closure with mrsa infection found. She is about 2 weeks into dapto and returns with fever, leukocytosis, ongoing pain and eschar of right knee wound and what appears to be a hematoma of the right thigh. INR has been up so that is good reason for the thigh findings. Must consider infected hematoma possible. UTI is possible. Pneumonia is possible given basilar rales. retroperitoneal hematoma possible .  PMR decompensation or adrenal insufficiency a consideration at well.    Pulmonary stable relative to prior admits --  wound care in progress  Completed IV ABX-5/5 -42 days  Debridement done-5/5  Vac placed 5/7

## 2018-05-08 NOTE — PROGRESS NOTE ADULT - SUBJECTIVE AND OBJECTIVE BOX
CC: f/u for mrsa knee infection    Patient reports no complaints     REVIEW OF SYSTEMS:  All other review of systems negative (Comprehensive ROS)      Other Medications Reviewed    Vital Signs Last 24 Hrs  T(C): 36.9 (08 May 2018 06:20), Max: 36.9 (08 May 2018 06:20)  T(F): 98.5 (08 May 2018 06:20), Max: 98.5 (08 May 2018 06:20)  HR: 77 (08 May 2018 06:20) (77 - 80)  BP: 110/65 (08 May 2018 06:20) (110/65 - 144/78)  BP(mean): --  RR: 19 (08 May 2018 06:20) (18 - 19)  SpO2: 95% (08 May 2018 06:20) (95% - 95%))    PHYSICAL EXAM:  General: alert, no acute distress  Eyes:  anicteric, no conjunctival injection, no discharge  Oropharynx: no lesions or injection 	  Neck: supple, without adenopathy  Lungs: clear to auscultation  Heart: regular rate and rhythm; no murmur, rubs or gallops  Abdomen: soft, nondistended, nontender, without mass or organomegaly. wound packed  Skin: no lesions  Extremities: right thigh wound debrided and packed, right knee eschar  Neurologic: alert, oriented, moves all extremities    LAB RESULTS:                                     8.1    11.97 )-----------( 276      ( 07 May 2018 09:29 )             26.4                           8.5    12.68 )-----------( 305      ( 06 May 2018 10:21 )             27.4     Culture - Other (04.27.18 @ 18:18)    Specimen Source: Skin abdominal wound    Culture Results:   No growth at 48 hours      RADIOLOGY REVIEWED:    < from: CT Femur No Cont, Right (04.19.18 @ 19:06) >  IMPRESSION:  1.  Patient status post explantation of right total knee arthroplasty and   washout. Antibiotic cement and intramedullary ruthann are identified in the   right knee joint. No evidence of acute hardware complication.  2.  No focal, drainable fluid collections are identified on today's   examination.  3.  Nonspecific subcutaneous edema involving the patient's pannus and   subcutaneous fat of the medial thigh.      < end of copied text >

## 2018-05-08 NOTE — PROGRESS NOTE ADULT - SUBJECTIVE AND OBJECTIVE BOX
PLASTIC SURGERY DAILY PROGRESS NOTE:    Subjective:  Patient seen and examined. She received a vac to her R groin yesterday. She is very upset about her current situation and need for dressing changes.     Objective:  Vital Signs Last 24 Hrs  T(C): 36.9 (08 May 2018 06:20), Max: 36.9 (08 May 2018 06:20)  T(F): 98.5 (08 May 2018 06:20), Max: 98.5 (08 May 2018 06:20)  HR: 77 (08 May 2018 06:20) (77 - 85)  BP: 110/65 (08 May 2018 06:20) (110/65 - 144/78)  BP(mean): --  RR: 19 (08 May 2018 06:20) (18 - 19)  SpO2: 95% (08 May 2018 06:20) (95% - 95%)    I&O's Detail    06 May 2018 07:01  -  07 May 2018 07:00  --------------------------------------------------------  IN:    Oral Fluid: 480 mL  Total IN: 480 mL    OUT:  Total OUT: 0 mL    Total NET: 480 mL      07 May 2018 07:01  -  08 May 2018 06:55  --------------------------------------------------------  IN:    Oral Fluid: 720 mL  Total IN: 720 mL    OUT:  Total OUT: 0 mL    Total NET: 720 mL    MEDICATIONS  (STANDING):  ascorbic acid 500 milliGRAM(s) Oral daily  aspirin enteric coated 81 milliGRAM(s) Oral daily  buDESOnide 160 MICROgram(s)/formoterol 4.5 MICROgram(s) Inhaler 2 Puff(s) Inhalation two times a day  calcium carbonate 1250 mG + Vitamin D (OsCal 500 + D) 1 Tablet(s) Oral three times a day  docusate sodium 100 milliGRAM(s) Oral three times a day  ferrous    sulfate 325 milliGRAM(s) Oral daily  folic acid 1 milliGRAM(s) Oral daily  lidocaine 1%/EPINEPHrine 1:100,000 Inj 10 milliLiter(s) Local Injection once  melatonin 3 milliGRAM(s) Oral at bedtime  minocycline 100 milliGRAM(s) Oral two times a day  multivitamin 1 Tablet(s) Oral daily  nystatin    Suspension 402145 Unit(s) Oral every 6 hours  nystatin Powder 1 Application(s) Topical two times a day  oxyCODONE  ER Tablet 10 milliGRAM(s) Oral every 12 hours  pantoprazole    Tablet 40 milliGRAM(s) Oral before breakfast  polyethylene glycol 3350 17 Gram(s) Oral daily  tiotropium 18 MICROgram(s) Capsule 1 Capsule(s) Inhalation daily  zinc oxide 20% Ointment 1 Application(s) Topical daily    MEDICATIONS  (PRN):  acetaminophen   Tablet 650 milliGRAM(s) Oral every 6 hours PRN For Temp greater than 38 C (100.4 F)  acetaminophen   Tablet. 650 milliGRAM(s) Oral every 6 hours PRN Mild Pain (1 - 3)  HYDROmorphone   Tablet 2 milliGRAM(s) Oral every 4 hours PRN Severe Pain (7 - 10)  lidocaine 2% Viscous 10 milliLiter(s) Swish and Spit every 6 hours PRN Mouth Sores  senna 2 Tablet(s) Oral at bedtime PRN Constipation    PHYSICAL EXAM:    General: In pain, resting in bed  MSK: Knee incision eschar stable with fibrinous exudate from medial wound, covered with wet to dry, vac in R groin  Abdomen: Infra-Umbilical wound with tracking, packed with wet-to-dry

## 2018-05-08 NOTE — PROGRESS NOTE ADULT - SUBJECTIVE AND OBJECTIVE BOX
Patient is a 77y old  Female who presents with a chief complaint of fever (11 Apr 2018 14:25)      SUBJECTIVE / OVERNIGHT EVENTS:    Patient seen and examined. denies cp sob. sleepy today. has not been out of bed or allowing nursing to change positions.       Vital Signs Last 24 Hrs  T(C): 36.9 (08 May 2018 06:20), Max: 36.9 (08 May 2018 06:20)  T(F): 98.5 (08 May 2018 06:20), Max: 98.5 (08 May 2018 06:20)  HR: 77 (08 May 2018 06:20) (77 - 80)  BP: 110/65 (08 May 2018 06:20) (110/65 - 144/78)  BP(mean): --  RR: 19 (08 May 2018 06:20) (18 - 19)  SpO2: 95% (08 May 2018 06:20) (95% - 95%)  I&O's Summary    07 May 2018 07:01  -  08 May 2018 07:00  --------------------------------------------------------  IN: 720 mL / OUT: 0 mL / NET: 720 mL    08 May 2018 07:01  -  08 May 2018 12:02  --------------------------------------------------------  IN: 240 mL / OUT: 0 mL / NET: 240 mL        PE:  GENERAL: NAD, AAOx3, obese  HEAD:  Atraumatic, Normocephalic  EYES: EOMI, PERRLA, conjunctiva and sclera clear  NECK: Supple, No JVD  CHEST/LUNG: CTABL, No wheeze  HEART: Regular rate and rhythm; no murmur  ABDOMEN: Soft, Nontender, Nondistended; Bowel sounds present, lower abd wound  EXTREMITIES:  2+ Peripheral Pulses, right knee eschar dressed, right  thigh wound with vac, abd wound dressed, patient does not want me to remove dressing to evaluate  SKIN: No rashes or lesions  NEURO: No focal deficits  LABS:                        8.1    11.97 )-----------( 276      ( 07 May 2018 09:29 )             26.4     05-07    140  |  100  |  13  ----------------------------<  107<H>  3.9   |  28  |  0.77    Ca    9.1      07 May 2018 07:11      PT/INR - ( 08 May 2018 09:51 )   PT: 37.4 sec;   INR: 3.23 ratio           CAPILLARY BLOOD GLUCOSE                RADIOLOGY & ADDITIONAL TESTS:    Imaging Personally Reviewed:  [x] YES  [ ] NO    Consultant(s) Notes Reviewed:  [x] YES  [ ] NO    MEDICATIONS  (STANDING):  ascorbic acid 500 milliGRAM(s) Oral daily  aspirin enteric coated 81 milliGRAM(s) Oral daily  buDESOnide 160 MICROgram(s)/formoterol 4.5 MICROgram(s) Inhaler 2 Puff(s) Inhalation two times a day  calcium carbonate 1250 mG + Vitamin D (OsCal 500 + D) 1 Tablet(s) Oral three times a day  docusate sodium 100 milliGRAM(s) Oral three times a day  ferrous    sulfate 325 milliGRAM(s) Oral daily  folic acid 1 milliGRAM(s) Oral daily  lidocaine 1%/EPINEPHrine 1:100,000 Inj 10 milliLiter(s) Local Injection once  melatonin 3 milliGRAM(s) Oral at bedtime  minocycline 100 milliGRAM(s) Oral two times a day  multivitamin 1 Tablet(s) Oral daily  nystatin    Suspension 268069 Unit(s) Oral every 6 hours  nystatin Powder 1 Application(s) Topical two times a day  oxyCODONE  ER Tablet 10 milliGRAM(s) Oral every 12 hours  pantoprazole    Tablet 40 milliGRAM(s) Oral before breakfast  polyethylene glycol 3350 17 Gram(s) Oral daily  tiotropium 18 MICROgram(s) Capsule 1 Capsule(s) Inhalation daily  zinc oxide 20% Ointment 1 Application(s) Topical daily    MEDICATIONS  (PRN):  acetaminophen   Tablet 650 milliGRAM(s) Oral every 6 hours PRN For Temp greater than 38 C (100.4 F)  acetaminophen   Tablet. 650 milliGRAM(s) Oral every 6 hours PRN Mild Pain (1 - 3)  bisacodyl Suppository 10 milliGRAM(s) Rectal daily PRN Constipation  HYDROmorphone   Tablet 2 milliGRAM(s) Oral every 4 hours PRN Severe Pain (7 - 10)  lidocaine 2% Viscous 10 milliLiter(s) Swish and Spit every 6 hours PRN Mouth Sores  senna 2 Tablet(s) Oral at bedtime PRN Constipation      Care Discussed with Consultants/Other Providers [x] YES  [ ] NO    HEALTH ISSUES - PROBLEM Dx:  Adjustment disorder, unspecified type  Delirium due to another medical condition  Pneumonia: Pneumonia  Aortic stenosis, severe: Aortic stenosis, severe  MYAH (obstructive sleep apnea): MYAH (obstructive sleep apnea)  Obesity: Obesity  Asthma: Asthma  SOB (shortness of breath): SOB (shortness of breath)  Chronic deep vein thrombosis (DVT) of lower extremity, unspecified laterality, unspecified vein: Chronic deep vein thrombosis (DVT) of lower extremity, unspecified laterality, unspecified vein  Arthralgia of knee, unspecified laterality: Arthralgia of knee, unspecified laterality  Hyperlipidemia, unspecified hyperlipidemia type: Hyperlipidemia, unspecified hyperlipidemia type  Asthma, unspecified asthma severity, unspecified whether complicated, unspecified whether persistent: Asthma, unspecified asthma severity, unspecified whether complicated, unspecified whether persistent  Coronary artery disease involving native coronary artery of native heart without angina pectoris: Coronary artery disease involving native coronary artery of native heart without angina pectoris  Gastroesophageal reflux disease, esophagitis presence not specified: Gastroesophageal reflux disease, esophagitis presence not specified  Fever, unspecified fever cause: Fever, unspecified fever cause

## 2018-05-08 NOTE — PROGRESS NOTE ADULT - PROBLEM SELECTOR PLAN 4
multifactorial-leg and lung--on ABX as per ID (daptomycin-completed 5/5)--next minocycline  need to reconsider other sources of infection-?endocarditis-if fevers persist/recurs

## 2018-05-08 NOTE — PROGRESS NOTE ADULT - SUBJECTIVE AND OBJECTIVE BOX
pain controlled, no new events, afebrile  vac now in place on thigh    abdominal wound - dressed by plastics, patient deferred exam by me at this time of this wound  RLE  thigh vac in place  surgical wound intact with stable eschar, some separation of eschar medially from skin, no exudate, dressed by prs/wound care  5/5 ta/ehl/gcs  silt l4-s1  2+ dp pulse

## 2018-05-08 NOTE — PROGRESS NOTE ADULT - ATTENDING COMMENTS
as above-  Pulm relatively stable-atelectasis, prior PE, asthma, cardiac Dz  Inhaler regimen as outlined above  DVT rx /prophylaxis  ID follow up of ABX  completed 5/5--then minocycline  (debridement done 5/5)--following WBC   PT evaluation and Psych evaluation    ortho/plastics follow up--?AKA-if conservative rx-not successful  Rehab pending    Everett Kumar MD-Pulmonary   [phone] as above-  Pulm relatively stable-atelectasis, prior PE, asthma, cardiac Dz  Inhaler regimen as outlined above  DVT rx /prophylaxis  ID follow up of ABX  completed 5/5--then minocycline  (debridement done 5/5)--following WBC   PT evaluation and Psych evaluation    ortho/plastics follow up-knee immobilizer/PT  Rehab pending    Everett Kumar MD-Pulmonary   [phone]

## 2018-05-08 NOTE — PROGRESS NOTE ADULT - ASSESSMENT
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 She now completed  6 weeks of dapto and now on  minocycline She was treated for pneumonia. She had hematoma of right thigh and abdomen wall with fat and skin necrosis so had debridement done.   wbc is trending down   no fever     Plan:   continue minocycline   wound care as per plastics   supportive care as per medicine, plastics and ortho teams

## 2018-05-08 NOTE — PROGRESS NOTE ADULT - ASSESSMENT
77 year old female who was admitted several weeks ago with aspiration pneumonia and fever with right knee pain. She also had proximal thigh swelling and there was a hematoma of the leg as well. She has a history of PMR on steroids. She also has a history of tavr, AICD andf remote bilateral TKR and was found to have strep bacteremia and a ERIKA was negative and she also had a eschar of the right knee as well. She was placed on iv antibiotics and I understand that she will be completing the antibiotics, daptomycin on this date. She told me that she had anemia in the distant past and was placed on antibiotics. She is being seen by the plastics service as well. A ct of th femur was not remarkable for infection and a ct of the chest done several weeks ago showed bibasilar PNA. She was also noted to have a left renal lesion that is unchanged now since 12/2016.     The consult was requested to see if there is anything we can do for her anemia. The counts on the day of this admission was white cell count of 9.71 hemoglobin 8.4 hematocrit 26.4 MCV 86 and MCHC 31.8 and platelet count of [id number] the diff count was 74 polys 15 lymphs and 7 monos the bun was 9 and the creatinine was 0.6     THe anemia as described is likely from anemia of inflammation. I would send off serum iron tibc and retic count and ferritin with a guaiac. She may be a candidate for epo to reduce transfusion needs, will follow.     Would send off iron studies, serum iron tibc and ferritin  The hemoglobin on 4/7 was 8.1 and iron studies were ordered for the am by me. She may be a candidate for epo if transfusions become a problem.   5/8 the ferritin came back at over 300 and therefore she is not iron deficient and epo can be considered if the hemoglobin drops and is in need of blood support.

## 2018-05-08 NOTE — PROGRESS NOTE ADULT - SUBJECTIVE AND OBJECTIVE BOX
CHIEF COMPLAINT:    Interval Events:    REVIEW OF SYSTEMS:  Constitutional: No fevers or chills. No weight loss. No fatigue or generalized malaise.  Eyes: No itching or discharge from the eyes  ENT: No ear pain. No ear discharge. No nasal congestion. No post nasal drip. No epistaxis. No throat pain. No sore throat. No difficulty swallowing.   CV: No chest pain. No palpitations. No lightheadedness or dizziness.   Resp: No dyspnea at rest. No dyspnea on exertion. No orthopnea. No wheezing. No cough. No stridor. No sputum production. No chest pain with respiration.  GI: No nausea. No vomiting. No diarrhea.  MSK: No joint pain or pain in any extremities  Integumentary: No skin lesions. No pedal edema.  Neurological: No gross motor weakness. No sensory changes.  [ ] All other systems negative  [ ] Unable to assess ROS because ________    OBJECTIVE:  ICU Vital Signs Last 24 Hrs  T(C): 36.5 (07 May 2018 21:45), Max: 36.5 (07 May 2018 06:40)  T(F): 97.7 (07 May 2018 21:45), Max: 97.7 (07 May 2018 06:40)  HR: 80 (07 May 2018 21:45) (80 - 86)  BP: 144/78 (07 May 2018 21:45) (138/76 - 155/83)  BP(mean): --  ABP: --  ABP(mean): --  RR: 18 (07 May 2018 21:45) (18 - 20)  SpO2: 95% (07 May 2018 21:45) (95% - 95%)        05-06 @ 07:01 - 05-07 @ 07:00  --------------------------------------------------------  IN: 480 mL / OUT: 0 mL / NET: 480 mL    05-07 @ 07:01  -  05-08 @ 05:04  --------------------------------------------------------  IN: 720 mL / OUT: 0 mL / NET: 720 mL      CAPILLARY BLOOD GLUCOSE          PHYSICAL EXAM:  General: Awake, alert, oriented X 3.   HEENT: Atraumatic, normocephalic.                 Mallampatti Grade                 No nasal congestion.                No tonsillar or pharyngeal exudates.  Lymph Nodes: No palpable lymphadenopathy  Neck: No JVD. No carotid bruit.   Respiratory: Normal chest expansion                         Normal percussion                         Normal and equal air entry                         No wheeze, rhonchi or rales.  Cardiovascular: S1 S2 normal. No murmurs, rubs or gallops.   Abdomen: Soft, non-tender, non-distended. No organomegaly.  Extremities: Warm to touch. Peripheral pulse palpable. No pedal edema.   Skin: No rashes or skin lesions  Neurological: Motor and sensory examination equal and normal in all four extremities.  Psychiatry: Appropriate mood and affect.    HOSPITAL MEDICATIONS:  MEDICATIONS  (STANDING):  ascorbic acid 500 milliGRAM(s) Oral daily  aspirin enteric coated 81 milliGRAM(s) Oral daily  buDESOnide 160 MICROgram(s)/formoterol 4.5 MICROgram(s) Inhaler 2 Puff(s) Inhalation two times a day  calcium carbonate 1250 mG + Vitamin D (OsCal 500 + D) 1 Tablet(s) Oral three times a day  docusate sodium 100 milliGRAM(s) Oral three times a day  ferrous    sulfate 325 milliGRAM(s) Oral daily  folic acid 1 milliGRAM(s) Oral daily  lidocaine 1%/EPINEPHrine 1:100,000 Inj 10 milliLiter(s) Local Injection once  melatonin 3 milliGRAM(s) Oral at bedtime  minocycline 100 milliGRAM(s) Oral two times a day  multivitamin 1 Tablet(s) Oral daily  nystatin    Suspension 611177 Unit(s) Oral every 6 hours  nystatin Powder 1 Application(s) Topical two times a day  oxyCODONE  ER Tablet 10 milliGRAM(s) Oral every 12 hours  pantoprazole    Tablet 40 milliGRAM(s) Oral before breakfast  polyethylene glycol 3350 17 Gram(s) Oral daily  tiotropium 18 MICROgram(s) Capsule 1 Capsule(s) Inhalation daily  zinc oxide 20% Ointment 1 Application(s) Topical daily    MEDICATIONS  (PRN):  acetaminophen   Tablet 650 milliGRAM(s) Oral every 6 hours PRN For Temp greater than 38 C (100.4 F)  acetaminophen   Tablet. 650 milliGRAM(s) Oral every 6 hours PRN Mild Pain (1 - 3)  HYDROmorphone   Tablet 2 milliGRAM(s) Oral every 4 hours PRN Severe Pain (7 - 10)  lidocaine 2% Viscous 10 milliLiter(s) Swish and Spit every 6 hours PRN Mouth Sores  senna 2 Tablet(s) Oral at bedtime PRN Constipation      LABS:                        8.1    11.97 )-----------( 276      ( 07 May 2018 09:29 )             26.4     05-07    140  |  100  |  13  ----------------------------<  107<H>  3.9   |  28  |  0.77    Ca    9.1      07 May 2018 07:11      PT/INR - ( 07 May 2018 07:32 )   PT: 40.1 sec;   INR: 3.46 ratio                   MICROBIOLOGY:     RADIOLOGY:  [ ] Reviewed and interpreted by me    Point of Care Ultrasound Findings:    PFT:    EKG: CHIEF COMPLAINT: upset w/ all over all--better overall, no sob or cough    Interval Events: wound vac placed    REVIEW OF SYSTEMS:  Constitutional: No fevers or chills. No weight loss. + fatigue or generalized malaise.  Eyes: No itching or discharge from the eyes  ENT: No ear pain. No ear discharge. No nasal congestion. No post nasal drip. No epistaxis. No throat pain. No sore throat. No difficulty swallowing.   CV: No chest pain. No palpitations. No lightheadedness or dizziness.   Resp: No dyspnea at rest. No dyspnea on exertion. No orthopnea. No wheezing. No cough. No stridor. No sputum production. No chest pain with respiration.  GI: No nausea. No vomiting. No diarrhea.  MSK: No joint pain or pain in any extremities-except RLE  Integumentary: No skin lesions. No pedal edema.  Neurological: No gross motor weakness. No sensory changes.  [+ ] All other systems negative  [ ] Unable to assess ROS because ________    OBJECTIVE:  ICU Vital Signs Last 24 Hrs  T(C): 36.5 (07 May 2018 21:45), Max: 36.5 (07 May 2018 06:40)  T(F): 97.7 (07 May 2018 21:45), Max: 97.7 (07 May 2018 06:40)  HR: 80 (07 May 2018 21:45) (80 - 86)  BP: 144/78 (07 May 2018 21:45) (138/76 - 155/83)  BP(mean): --  ABP: --  ABP(mean): --  RR: 18 (07 May 2018 21:45) (18 - 20)  SpO2: 95% (07 May 2018 21:45) (95% - 95%)        05-06 @ 07:01  -  05-07 @ 07:00  --------------------------------------------------------  IN: 480 mL / OUT: 0 mL / NET: 480 mL    05-07 @ 07:01  -  05-08 @ 05:04  --------------------------------------------------------  IN: 720 mL / OUT: 0 mL / NET: 720 mL      CAPILLARY BLOOD GLUCOSE          PHYSICAL EXAM: NAD in bed on NC O2  General: Awake, alert, oriented X 3.   HEENT: Atraumatic, normocephalic.                 Mallampatti Grade                 No nasal congestion.                No tonsillar or pharyngeal exudates.  Lymph Nodes: No palpable lymphadenopathy  Neck: No JVD. No carotid bruit.   Respiratory: abnormal chest expansion-reduced BS bases                         Normal percussion                         Normal and equal air entry                         No wheeze, rhonchi or rales.  Cardiovascular: S1 S2 normal. No murmurs, rubs or gallops.   Abdomen: Soft, non-tender, non-distended. No organomegaly.  Extremities: Warm to touch. Peripheral pulse palpable. No pedal edema.  RLE wound vac  Skin: No rashes or skin lesions  Neurological: Motor and sensory examination equal and normal in all four extremities.  Psychiatry: Appropriate mood and affect.    HOSPITAL MEDICATIONS:  MEDICATIONS  (STANDING):  ascorbic acid 500 milliGRAM(s) Oral daily  aspirin enteric coated 81 milliGRAM(s) Oral daily  buDESOnide 160 MICROgram(s)/formoterol 4.5 MICROgram(s) Inhaler 2 Puff(s) Inhalation two times a day  calcium carbonate 1250 mG + Vitamin D (OsCal 500 + D) 1 Tablet(s) Oral three times a day  docusate sodium 100 milliGRAM(s) Oral three times a day  ferrous    sulfate 325 milliGRAM(s) Oral daily  folic acid 1 milliGRAM(s) Oral daily  lidocaine 1%/EPINEPHrine 1:100,000 Inj 10 milliLiter(s) Local Injection once  melatonin 3 milliGRAM(s) Oral at bedtime  minocycline 100 milliGRAM(s) Oral two times a day  multivitamin 1 Tablet(s) Oral daily  nystatin    Suspension 834849 Unit(s) Oral every 6 hours  nystatin Powder 1 Application(s) Topical two times a day  oxyCODONE  ER Tablet 10 milliGRAM(s) Oral every 12 hours  pantoprazole    Tablet 40 milliGRAM(s) Oral before breakfast  polyethylene glycol 3350 17 Gram(s) Oral daily  tiotropium 18 MICROgram(s) Capsule 1 Capsule(s) Inhalation daily  zinc oxide 20% Ointment 1 Application(s) Topical daily    MEDICATIONS  (PRN):  acetaminophen   Tablet 650 milliGRAM(s) Oral every 6 hours PRN For Temp greater than 38 C (100.4 F)  acetaminophen   Tablet. 650 milliGRAM(s) Oral every 6 hours PRN Mild Pain (1 - 3)  HYDROmorphone   Tablet 2 milliGRAM(s) Oral every 4 hours PRN Severe Pain (7 - 10)  lidocaine 2% Viscous 10 milliLiter(s) Swish and Spit every 6 hours PRN Mouth Sores  senna 2 Tablet(s) Oral at bedtime PRN Constipation      LABS:                        8.1    11.97 )-----------( 276      ( 07 May 2018 09:29 )             26.4     05-07    140  |  100  |  13  ----------------------------<  107<H>  3.9   |  28  |  0.77    Ca    9.1      07 May 2018 07:11      PT/INR - ( 07 May 2018 07:32 )   PT: 40.1 sec;   INR: 3.46 ratio                   MICROBIOLOGY:     RADIOLOGY:  [ ] Reviewed and interpreted by me    Point of Care Ultrasound Findings:    PFT:    EKG:

## 2018-05-08 NOTE — PROGRESS NOTE ADULT - SUBJECTIVE AND OBJECTIVE BOX
No acute events. Pain well controlled with pain medications.     Vital Signs Last 24 Hrs  T(C): 36.4 (08 May 2018 17:26), Max: 36.9 (08 May 2018 06:20)  T(F): 97.6 (08 May 2018 17:26), Max: 98.5 (08 May 2018 06:20)  HR: 64 (08 May 2018 17:26) (64 - 81)  BP: 117/76 (08 May 2018 17:26) (110/65 - 144/78)  BP(mean): --  RR: 18 (08 May 2018 17:26) (18 - 19)  SpO2: 93% (08 May 2018 17:26) (93% - 95%)    Exam:  Gen: NAD, Wound vac holding suction on proximal thigh, Eschar over anterior knee  Motor: EHL/FHL/TA/Gastrocnemius grossly intact  Sensory: SILT DP/SP/S/S/T nerve distributions  Dressing: Clean, Dry, Intact    A/P: 77 year old female s/p R knee stage 1 revision  - Local wound care per plastics  - ABx per ID  - PT/OT, OOB, no knee ROM  - No acute orthopedic intervention at this time

## 2018-05-08 NOTE — PROGRESS NOTE ADULT - ASSESSMENT
no plan for orthopedic surgical intervention per family and patient preference  local wound care per plastics and wound care team to abdomen and knee, VAC placed on thigh wound  now on PO abx per ID team, has had small increase in WBC without fevers, likely 2/2 beside debridement  encourage patient to spend majority of bed oob in bedside chair as able, has not been OOB in some time, please have PT return on daily basis to try if posisble  PT to help mobilize, she must remain 50% wb on the RLE and NO KNEE MOTION allowed, knee immobilizer if oob  coumadin, inr goal 2-3, now supratherapeutic  continue to optimize nutrition  continue to involve psych	  appreciate hematology consult for chronic anemia, despite chronic anemia which she is tolerating, would prefer hb>12 if possible to improve oxygen supply for wound healing    Nate Bass MD

## 2018-05-08 NOTE — PROGRESS NOTE ADULT - ATTENDING COMMENTS
Patient seen and examined  Family meeting yesterday to discuss plan for the multiple wounds    After long discussion the decision reached to try to ovoid surgery and continue with local wound care.  The goal of care is to get the thigh dressing stable enough for a VAC dressing and cont wet to dry to both abdominal and knee wounds    Patient had multiple debridement at bedside.    Abdominal wound still has some fibrinous ecaudate will cont wet to dry    Medial thigh wound is clean and ready for a VAC dressing -- VAC wound care team called -- They will evaluate patient if she is a candidate for a VAC veraflo with CLEANSE CHOICE™ Dressing or V.A.C. VERAFLO™ Large Dressing    Right knee wound will cont wet to dry -- Patient may require surgical intervention in the future

## 2018-05-09 LAB
ANION GAP SERPL CALC-SCNC: 13 MMOL/L — SIGNIFICANT CHANGE UP (ref 5–17)
BUN SERPL-MCNC: 17 MG/DL — SIGNIFICANT CHANGE UP (ref 7–23)
CALCIUM SERPL-MCNC: 9.5 MG/DL — SIGNIFICANT CHANGE UP (ref 8.4–10.5)
CHLORIDE SERPL-SCNC: 98 MMOL/L — SIGNIFICANT CHANGE UP (ref 96–108)
CO2 SERPL-SCNC: 29 MMOL/L — SIGNIFICANT CHANGE UP (ref 22–31)
CREAT SERPL-MCNC: 0.72 MG/DL — SIGNIFICANT CHANGE UP (ref 0.5–1.3)
GLUCOSE SERPL-MCNC: 95 MG/DL — SIGNIFICANT CHANGE UP (ref 70–99)
HCT VFR BLD CALC: 26.5 % — LOW (ref 34.5–45)
HGB BLD-MCNC: 8.1 G/DL — LOW (ref 11.5–15.5)
INR BLD: 3.04 RATIO — HIGH (ref 0.88–1.16)
MCHC RBC-ENTMCNC: 27.3 PG — SIGNIFICANT CHANGE UP (ref 27–34)
MCHC RBC-ENTMCNC: 30.6 GM/DL — LOW (ref 32–36)
MCV RBC AUTO: 89.2 FL — SIGNIFICANT CHANGE UP (ref 80–100)
PLATELET # BLD AUTO: 235 K/UL — SIGNIFICANT CHANGE UP (ref 150–400)
POTASSIUM SERPL-MCNC: 3.6 MMOL/L — SIGNIFICANT CHANGE UP (ref 3.5–5.3)
POTASSIUM SERPL-SCNC: 3.6 MMOL/L — SIGNIFICANT CHANGE UP (ref 3.5–5.3)
PROTHROM AB SERPL-ACNC: 35.1 SEC — HIGH (ref 10–13.1)
RBC # BLD: 2.97 M/UL — LOW (ref 3.8–5.2)
RBC # FLD: 17.1 % — HIGH (ref 10.3–14.5)
SODIUM SERPL-SCNC: 140 MMOL/L — SIGNIFICANT CHANGE UP (ref 135–145)
WBC # BLD: 11.27 K/UL — HIGH (ref 3.8–10.5)
WBC # FLD AUTO: 11.27 K/UL — HIGH (ref 3.8–10.5)

## 2018-05-09 PROCEDURE — 99232 SBSQ HOSP IP/OBS MODERATE 35: CPT

## 2018-05-09 RX ORDER — HYDROMORPHONE HYDROCHLORIDE 2 MG/ML
0.5 INJECTION INTRAMUSCULAR; INTRAVENOUS; SUBCUTANEOUS ONCE
Qty: 0 | Refills: 0 | Status: DISCONTINUED | OUTPATIENT
Start: 2018-05-09 | End: 2018-05-09

## 2018-05-09 RX ADMIN — Medication 500 MILLIGRAM(S): at 11:23

## 2018-05-09 RX ADMIN — Medication 500000 UNIT(S): at 11:23

## 2018-05-09 RX ADMIN — Medication 500000 UNIT(S): at 23:11

## 2018-05-09 RX ADMIN — Medication 1 TABLET(S): at 05:20

## 2018-05-09 RX ADMIN — BUDESONIDE AND FORMOTEROL FUMARATE DIHYDRATE 2 PUFF(S): 160; 4.5 AEROSOL RESPIRATORY (INHALATION) at 17:05

## 2018-05-09 RX ADMIN — PANTOPRAZOLE SODIUM 40 MILLIGRAM(S): 20 TABLET, DELAYED RELEASE ORAL at 05:21

## 2018-05-09 RX ADMIN — Medication 325 MILLIGRAM(S): at 11:23

## 2018-05-09 RX ADMIN — HYDROMORPHONE HYDROCHLORIDE 2 MILLIGRAM(S): 2 INJECTION INTRAMUSCULAR; INTRAVENOUS; SUBCUTANEOUS at 17:05

## 2018-05-09 RX ADMIN — HYDROMORPHONE HYDROCHLORIDE 0.5 MILLIGRAM(S): 2 INJECTION INTRAMUSCULAR; INTRAVENOUS; SUBCUTANEOUS at 11:23

## 2018-05-09 RX ADMIN — Medication 81 MILLIGRAM(S): at 11:23

## 2018-05-09 RX ADMIN — NYSTATIN CREAM 1 APPLICATION(S): 100000 CREAM TOPICAL at 23:11

## 2018-05-09 RX ADMIN — Medication 500000 UNIT(S): at 17:05

## 2018-05-09 RX ADMIN — HYDROMORPHONE HYDROCHLORIDE 2 MILLIGRAM(S): 2 INJECTION INTRAMUSCULAR; INTRAVENOUS; SUBCUTANEOUS at 09:35

## 2018-05-09 RX ADMIN — NYSTATIN CREAM 1 APPLICATION(S): 100000 CREAM TOPICAL at 14:42

## 2018-05-09 RX ADMIN — MINOCYCLINE HYDROCHLORIDE 100 MILLIGRAM(S): 45 TABLET, EXTENDED RELEASE ORAL at 17:05

## 2018-05-09 RX ADMIN — Medication 500000 UNIT(S): at 05:20

## 2018-05-09 RX ADMIN — Medication 1 MILLIGRAM(S): at 11:23

## 2018-05-09 RX ADMIN — HYDROMORPHONE HYDROCHLORIDE 0.5 MILLIGRAM(S): 2 INJECTION INTRAMUSCULAR; INTRAVENOUS; SUBCUTANEOUS at 11:40

## 2018-05-09 RX ADMIN — Medication 100 MILLIGRAM(S): at 23:11

## 2018-05-09 RX ADMIN — MINOCYCLINE HYDROCHLORIDE 100 MILLIGRAM(S): 45 TABLET, EXTENDED RELEASE ORAL at 05:20

## 2018-05-09 RX ADMIN — Medication 1 TABLET(S): at 23:11

## 2018-05-09 RX ADMIN — Medication 1 TABLET(S): at 11:23

## 2018-05-09 RX ADMIN — HYDROMORPHONE HYDROCHLORIDE 2 MILLIGRAM(S): 2 INJECTION INTRAMUSCULAR; INTRAVENOUS; SUBCUTANEOUS at 10:10

## 2018-05-09 RX ADMIN — Medication 500000 UNIT(S): at 00:52

## 2018-05-09 RX ADMIN — Medication 3 MILLIGRAM(S): at 23:11

## 2018-05-09 RX ADMIN — Medication 1 TABLET(S): at 14:42

## 2018-05-09 RX ADMIN — Medication 1 APPLICATION(S): at 05:20

## 2018-05-09 RX ADMIN — Medication 1 APPLICATION(S): at 23:13

## 2018-05-09 RX ADMIN — HYDROMORPHONE HYDROCHLORIDE 2 MILLIGRAM(S): 2 INJECTION INTRAMUSCULAR; INTRAVENOUS; SUBCUTANEOUS at 17:35

## 2018-05-09 RX ADMIN — TIOTROPIUM BROMIDE 1 CAPSULE(S): 18 CAPSULE ORAL; RESPIRATORY (INHALATION) at 11:24

## 2018-05-09 RX ADMIN — NYSTATIN CREAM 1 APPLICATION(S): 100000 CREAM TOPICAL at 01:06

## 2018-05-09 RX ADMIN — Medication 100 MILLIGRAM(S): at 05:20

## 2018-05-09 RX ADMIN — BUDESONIDE AND FORMOTEROL FUMARATE DIHYDRATE 2 PUFF(S): 160; 4.5 AEROSOL RESPIRATORY (INHALATION) at 05:21

## 2018-05-09 RX ADMIN — Medication 1 APPLICATION(S): at 14:42

## 2018-05-09 RX ADMIN — ZINC OXIDE 1 APPLICATION(S): 200 OINTMENT TOPICAL at 11:24

## 2018-05-09 NOTE — PROGRESS NOTE ADULT - SUBJECTIVE AND OBJECTIVE BOX
CHIEF COMPLAINT:    Interval Events:    REVIEW OF SYSTEMS:  Constitutional: No fevers or chills. No weight loss. No fatigue or generalized malaise.  Eyes: No itching or discharge from the eyes  ENT: No ear pain. No ear discharge. No nasal congestion. No post nasal drip. No epistaxis. No throat pain. No sore throat. No difficulty swallowing.   CV: No chest pain. No palpitations. No lightheadedness or dizziness.   Resp: No dyspnea at rest. No dyspnea on exertion. No orthopnea. No wheezing. No cough. No stridor. No sputum production. No chest pain with respiration.  GI: No nausea. No vomiting. No diarrhea.  MSK: No joint pain or pain in any extremities  Integumentary: No skin lesions. No pedal edema.  Neurological: No gross motor weakness. No sensory changes.  [ ] All other systems negative  [ ] Unable to assess ROS because ________    OBJECTIVE:  ICU Vital Signs Last 24 Hrs  T(C): 36.8 (09 May 2018 04:35), Max: 37 (09 May 2018 00:08)  T(F): 98.3 (09 May 2018 04:35), Max: 98.6 (09 May 2018 00:08)  HR: 78 (09 May 2018 04:35) (64 - 81)  BP: 121/70 (09 May 2018 04:35) (110/65 - 123/73)  BP(mean): --  ABP: --  ABP(mean): --  RR: 18 (09 May 2018 04:35) (18 - 19)  SpO2: 97% (09 May 2018 04:35) (93% - 97%)        05-07 @ 07:01  -  05-08 @ 07:00  --------------------------------------------------------  IN: 720 mL / OUT: 0 mL / NET: 720 mL    05-08 @ 07:01  -  05-09 @ 05:03  --------------------------------------------------------  IN: 660 mL / OUT: 300 mL / NET: 360 mL      CAPILLARY BLOOD GLUCOSE          PHYSICAL EXAM:  General: Awake, alert, oriented X 3.   HEENT: Atraumatic, normocephalic.                 Mallampatti Grade                 No nasal congestion.                No tonsillar or pharyngeal exudates.  Lymph Nodes: No palpable lymphadenopathy  Neck: No JVD. No carotid bruit.   Respiratory: Normal chest expansion                         Normal percussion                         Normal and equal air entry                         No wheeze, rhonchi or rales.  Cardiovascular: S1 S2 normal. No murmurs, rubs or gallops.   Abdomen: Soft, non-tender, non-distended. No organomegaly.  Extremities: Warm to touch. Peripheral pulse palpable. No pedal edema.   Skin: No rashes or skin lesions  Neurological: Motor and sensory examination equal and normal in all four extremities.  Psychiatry: Appropriate mood and affect.    HOSPITAL MEDICATIONS:  MEDICATIONS  (STANDING):  ascorbic acid 500 milliGRAM(s) Oral daily  aspirin enteric coated 81 milliGRAM(s) Oral daily  buDESOnide 160 MICROgram(s)/formoterol 4.5 MICROgram(s) Inhaler 2 Puff(s) Inhalation two times a day  calcium carbonate 1250 mG + Vitamin D (OsCal 500 + D) 1 Tablet(s) Oral three times a day  Dakins Solution - 1/4 Strength 1 Application(s) Topical three times a day  docusate sodium 100 milliGRAM(s) Oral three times a day  ferrous    sulfate 325 milliGRAM(s) Oral daily  folic acid 1 milliGRAM(s) Oral daily  lidocaine 1%/EPINEPHrine 1:100,000 Inj 10 milliLiter(s) Local Injection once  melatonin 3 milliGRAM(s) Oral at bedtime  minocycline 100 milliGRAM(s) Oral two times a day  multivitamin 1 Tablet(s) Oral daily  nystatin    Suspension 628541 Unit(s) Oral every 6 hours  nystatin Powder 1 Application(s) Topical two times a day  pantoprazole    Tablet 40 milliGRAM(s) Oral before breakfast  polyethylene glycol 3350 17 Gram(s) Oral daily  tiotropium 18 MICROgram(s) Capsule 1 Capsule(s) Inhalation daily  zinc oxide 20% Ointment 1 Application(s) Topical daily    MEDICATIONS  (PRN):  acetaminophen   Tablet 650 milliGRAM(s) Oral every 6 hours PRN For Temp greater than 38 C (100.4 F)  acetaminophen   Tablet. 650 milliGRAM(s) Oral every 6 hours PRN Mild Pain (1 - 3)  bisacodyl Suppository 10 milliGRAM(s) Rectal daily PRN Constipation  HYDROmorphone   Tablet 2 milliGRAM(s) Oral every 4 hours PRN Severe Pain (7 - 10)  lidocaine 2% Viscous 10 milliLiter(s) Swish and Spit every 6 hours PRN Mouth Sores  senna 2 Tablet(s) Oral at bedtime PRN Constipation      LABS:                        8.1    11.97 )-----------( 276      ( 07 May 2018 09:29 )             26.4     05-07    140  |  100  |  13  ----------------------------<  107<H>  3.9   |  28  |  0.77    Ca    9.1      07 May 2018 07:11      PT/INR - ( 08 May 2018 09:51 )   PT: 37.4 sec;   INR: 3.23 ratio                   MICROBIOLOGY:     RADIOLOGY:  [ ] Reviewed and interpreted by me    Point of Care Ultrasound Findings:    PFT:    EKG: CHIEF COMPLAINT: better spirits--no sob or cough--no change in pain    Interval Events: ortho/plastics f/up    REVIEW OF SYSTEMS:  Constitutional: No fevers or chills. No weight loss. No fatigue or generalized malaise.  Eyes: No itching or discharge from the eyes  ENT: No ear pain. No ear discharge. No nasal congestion. No post nasal drip. No epistaxis. No throat pain. No sore throat. No difficulty swallowing.   CV: No chest pain. No palpitations. No lightheadedness or dizziness.   Resp: No dyspnea at rest. + dyspnea on exertion. No orthopnea. No wheezing. No cough. No stridor. No sputum production. No chest pain with respiration.  GI: No nausea. No vomiting. No diarrhea.  MSK: No joint pain or pain in any extremities  Integumentary: No skin lesions. No pedal edema.  Neurological: No gross motor weakness. No sensory changes.  [+ ] All other systems negative  [ ] Unable to assess ROS because ________    OBJECTIVE:  ICU Vital Signs Last 24 Hrs  T(C): 36.8 (09 May 2018 04:35), Max: 37 (09 May 2018 00:08)  T(F): 98.3 (09 May 2018 04:35), Max: 98.6 (09 May 2018 00:08)  HR: 78 (09 May 2018 04:35) (64 - 81)  BP: 121/70 (09 May 2018 04:35) (110/65 - 123/73)  BP(mean): --  ABP: --  ABP(mean): --  RR: 18 (09 May 2018 04:35) (18 - 19)  SpO2: 97% (09 May 2018 04:35) (93% - 97%)        05-07 @ 07:01  -  05-08 @ 07:00  --------------------------------------------------------  IN: 720 mL / OUT: 0 mL / NET: 720 mL    05-08 @ 07:01  -  05-09 @ 05:03  --------------------------------------------------------  IN: 660 mL / OUT: 300 mL / NET: 360 mL      CAPILLARY BLOOD GLUCOSE          PHYSICAL EXAM: NAD in bed  General: Awake, alert, oriented X 3.   HEENT: Atraumatic, normocephalic.                 Mallampatti Grade 3                No nasal congestion.                No tonsillar or pharyngeal exudates.  Lymph Nodes: No palpable lymphadenopathy  Neck: No JVD. No carotid bruit.   Respiratory: Normal chest expansion                         Normal percussion                         Normal and equal air entry                         No wheeze, rhonchi or rales.  Cardiovascular: S1 S2 normal. No murmurs, rubs or gallops.   Abdomen: Soft, non-tender, non-distended. No organomegaly.  Extremities: Warm to touch. Peripheral pulse palpable. No pedal edema.   Skin: No rashes or skin lesions RLE wound  Neurological: Motor and sensory examination equal and normal in all four extremities.  Psychiatry: Appropriate mood and affect.    HOSPITAL MEDICATIONS:  MEDICATIONS  (STANDING):  ascorbic acid 500 milliGRAM(s) Oral daily  aspirin enteric coated 81 milliGRAM(s) Oral daily  buDESOnide 160 MICROgram(s)/formoterol 4.5 MICROgram(s) Inhaler 2 Puff(s) Inhalation two times a day  calcium carbonate 1250 mG + Vitamin D (OsCal 500 + D) 1 Tablet(s) Oral three times a day  Dakins Solution - 1/4 Strength 1 Application(s) Topical three times a day  docusate sodium 100 milliGRAM(s) Oral three times a day  ferrous    sulfate 325 milliGRAM(s) Oral daily  folic acid 1 milliGRAM(s) Oral daily  lidocaine 1%/EPINEPHrine 1:100,000 Inj 10 milliLiter(s) Local Injection once  melatonin 3 milliGRAM(s) Oral at bedtime  minocycline 100 milliGRAM(s) Oral two times a day  multivitamin 1 Tablet(s) Oral daily  nystatin    Suspension 417893 Unit(s) Oral every 6 hours  nystatin Powder 1 Application(s) Topical two times a day  pantoprazole    Tablet 40 milliGRAM(s) Oral before breakfast  polyethylene glycol 3350 17 Gram(s) Oral daily  tiotropium 18 MICROgram(s) Capsule 1 Capsule(s) Inhalation daily  zinc oxide 20% Ointment 1 Application(s) Topical daily    MEDICATIONS  (PRN):  acetaminophen   Tablet 650 milliGRAM(s) Oral every 6 hours PRN For Temp greater than 38 C (100.4 F)  acetaminophen   Tablet. 650 milliGRAM(s) Oral every 6 hours PRN Mild Pain (1 - 3)  bisacodyl Suppository 10 milliGRAM(s) Rectal daily PRN Constipation  HYDROmorphone   Tablet 2 milliGRAM(s) Oral every 4 hours PRN Severe Pain (7 - 10)  lidocaine 2% Viscous 10 milliLiter(s) Swish and Spit every 6 hours PRN Mouth Sores  senna 2 Tablet(s) Oral at bedtime PRN Constipation      LABS:                        8.1    11.97 )-----------( 276      ( 07 May 2018 09:29 )             26.4     05-07    140  |  100  |  13  ----------------------------<  107<H>  3.9   |  28  |  0.77    Ca    9.1      07 May 2018 07:11      PT/INR - ( 08 May 2018 09:51 )   PT: 37.4 sec;   INR: 3.23 ratio                   MICROBIOLOGY:     RADIOLOGY:  [ ] Reviewed and interpreted by me    Point of Care Ultrasound Findings:    PFT:    EKG:

## 2018-05-09 NOTE — PROGRESS NOTE ADULT - SUBJECTIVE AND OBJECTIVE BOX
PLASTIC SURGERY DAILY PROGRESS NOTE:    Subjective:  Patient seen and examined. No acute events overnight     Objective:  Vital Signs Last 24 Hrs  T(C): 36.8 (05-09-18 @ 04:35), Max: 37 (05-09-18 @ 00:08)  HR: 78 (05-09-18 @ 04:35) (64 - 81)  BP: 121/70 (05-09-18 @ 04:35) (112/65 - 123/73)  BP(mean): --  ABP: --  ABP(mean): --  RR: 18 (05-09-18 @ 04:35) (18 - 18)  SpO2: 97% (05-09-18 @ 04:35) (93% - 97%)  Wt(kg): --  CVP(mm Hg): --  CI: --  CAPILLARY BLOOD GLUCOSE       N/A      05-08 @ 07:01  -  05-09 @ 07:00  --------------------------------------------------------  IN:    Oral Fluid: 660 mL  Total IN: 660 mL    OUT:    Voided: 300 mL  Total OUT: 300 mL    Total NET: 360 mL        MEDICATIONS  (STANDING):  ascorbic acid 500 milliGRAM(s) Oral daily  aspirin enteric coated 81 milliGRAM(s) Oral daily  buDESOnide 160 MICROgram(s)/formoterol 4.5 MICROgram(s) Inhaler 2 Puff(s) Inhalation two times a day  calcium carbonate 1250 mG + Vitamin D (OsCal 500 + D) 1 Tablet(s) Oral three times a day  docusate sodium 100 milliGRAM(s) Oral three times a day  ferrous    sulfate 325 milliGRAM(s) Oral daily  folic acid 1 milliGRAM(s) Oral daily  lidocaine 1%/EPINEPHrine 1:100,000 Inj 10 milliLiter(s) Local Injection once  melatonin 3 milliGRAM(s) Oral at bedtime  minocycline 100 milliGRAM(s) Oral two times a day  multivitamin 1 Tablet(s) Oral daily  nystatin    Suspension 492565 Unit(s) Oral every 6 hours  nystatin Powder 1 Application(s) Topical two times a day  oxyCODONE  ER Tablet 10 milliGRAM(s) Oral every 12 hours  pantoprazole    Tablet 40 milliGRAM(s) Oral before breakfast  polyethylene glycol 3350 17 Gram(s) Oral daily  tiotropium 18 MICROgram(s) Capsule 1 Capsule(s) Inhalation daily  zinc oxide 20% Ointment 1 Application(s) Topical daily    MEDICATIONS  (PRN):  acetaminophen   Tablet 650 milliGRAM(s) Oral every 6 hours PRN For Temp greater than 38 C (100.4 F)  acetaminophen   Tablet. 650 milliGRAM(s) Oral every 6 hours PRN Mild Pain (1 - 3)  HYDROmorphone   Tablet 2 milliGRAM(s) Oral every 4 hours PRN Severe Pain (7 - 10)  lidocaine 2% Viscous 10 milliLiter(s) Swish and Spit every 6 hours PRN Mouth Sores  senna 2 Tablet(s) Oral at bedtime PRN Constipation    PHYSICAL EXAM:    General: Resting in bed  MSK: R groin with VAC in place holding suction.  Knee with stable eschar with medial area open, wound base appears clean, no purulence  Abdomen: Infra-Umbilical wound with tracking, packed with Dakins wet-to-dry gauze.  Base of wound appears clean without purulence; fibrinous exudate noted

## 2018-05-09 NOTE — PROGRESS NOTE ADULT - SUBJECTIVE AND OBJECTIVE BOX
Pt is a 77 year old female who was admitted several weeks ago with aspiration pneumonia and fever with right knee pain. She also had proximal thigh swelling and there was a hematoma of the leg as well. She has a history of PMR on steroids. She also has a history of tavr, AICD andf remote bilateral TKR and was found to have strep bacteremia and a ERIKA was negative and she also had a eschar of the right knee as well. She was placed on iv antibiotics and I understand that she will be completing the antibiotics, daptomycin on this date. She told me that she had anemia in the distant past and was placed on antibiotics. She is being seen by the plastics service as well. A ct of th femur was not remarkable for infection and a ct of the chest done several weeks ago showed bibasilar PNA. She was also noted to have a left renal lesion that is unchanged now since 12/2016.     The consult was requested to see if there is anything we can do for her anemia. The counts on the day of this admission was white cell count of 9.71 hemoglobin 8.4 hematocrit 26.4 MCV 86 and MCHC 31.8 and platelet count of 106339 the diff count was 74 polys 15 lymphs and 7 monos the bun was 9 and the creatinine was 0.6     5/4 the pt is stable and the iron studies are still pending at this time. The hemoglobin was 8.4. 5/7 the pt was seen and the notes over charu last few days. The pt will in the am have ferritin and iron studies sent off. 5/8 the ferritin came back as over 300 and therefore she is not iron deficient and the anemia is consistent with chronic disease. The use of epogen might be considered here if hemoglobin drops and can be implemented to reduce blood transfusions. 5/9 pt clearly looks better and repeat hemoglobin was stable at 8.1  PE weak appearing with female and needed help to do normal daily activities large eschar as described on the lower leg and at the time of my arrival, the wound vac was in place and she was being cared for by the staff.  5/8 the ferritin came back at over 300 and therefore she is not iron deficient and epo can be considered if the hemoglobin drops and is in need of blood support.  pmh as above in the body of the report  FH/SH no  contrib  MEDICATIONS  (STANDING):  ascorbic acid 500 milliGRAM(s) Oral daily  aspirin enteric coated 81 milliGRAM(s) Oral daily  buDESOnide 160 MICROgram(s)/formoterol 4.5 MICROgram(s) Inhaler 2 Puff(s) Inhalation two times a day  calcium carbonate 1250 mG + Vitamin D (OsCal 500 + D) 1 Tablet(s) Oral three times a day  Dakins Solution - 1/4 Strength 1 Application(s) Topical three times a day  docusate sodium 100 milliGRAM(s) Oral three times a day  ferrous    sulfate 325 milliGRAM(s) Oral daily  folic acid 1 milliGRAM(s) Oral daily  lidocaine 1%/EPINEPHrine 1:100,000 Inj 10 milliLiter(s) Local Injection once  melatonin 3 milliGRAM(s) Oral at bedtime  minocycline 100 milliGRAM(s) Oral two times a day  multivitamin 1 Tablet(s) Oral daily  nystatin    Suspension 675660 Unit(s) Oral every 6 hours  nystatin Powder 1 Application(s) Topical two times a day  pantoprazole    Tablet 40 milliGRAM(s) Oral before breakfast  polyethylene glycol 3350 17 Gram(s) Oral daily  tiotropium 18 MICROgram(s) Capsule 1 Capsule(s) Inhalation daily  zinc oxide 20% Ointment 1 Application(s) Topical daily  ICU Vital Signs Last 24 Hrs  T(C): 37.1 (09 May 2018 11:38), Max: 37.1 (09 May 2018 11:38)  T(F): 98.7 (09 May 2018 11:38), Max: 98.7 (09 May 2018 11:38)  HR: 82 (09 May 2018 11:38) (64 - 82)  BP: 119/74 (09 May 2018 11:38) (112/65 - 123/73)  BP(mean): --  ABP: --  ABP(mean): --  RR: 18 (09 May 2018 11:38) (18 - 18)  SpO2: 98% (09 May 2018 11:38) (93% - 98%)    MEDICATIONS  (PRN):  acetaminophen   Tablet 650 milliGRAM(s) Oral every 6 hours PRN For Temp greater than 38 C (100.4 F)  acetaminophen   Tablet. 650 milliGRAM(s) Oral every 6 hours PRN Mild Pain (1 - 3)  bisacodyl Suppository 10 milliGRAM(s) Rectal daily PRN Constipation  HYDROmorphone   Tablet 2 milliGRAM(s) Oral every 4 hours PRN Severe Pain (7 - 10)  lidocaine 2% Viscous 10 milliLiter(s) Swish and Spit every 6 hours PRN Mouth Sores  senna 2 Tablet(s) Oral at bedtime PRN Constipation  Vital Signs Last 24 Hrs  T(C): 36.9 (08 May 2018 06:20), Max: 36.9 (08 May 2018 06:20)  T(F): 98.5 (08 May 2018 06:20), Max: 98.5 (08 May 2018 06:20)  HR: 77 (08 May 2018 06:20) (77 - 80)  BP: 110/65 (08 May 2018 06:20) (110/65 - 144/78)  BP(mean): --  RR: 19 (08 May 2018 06:20) (18 - 19)  SpO2: 95% (08 May 2018 06:20) (95% - 95%)  MEDICATIONS  (PRN):  acetaminophen   Tablet 650 milliGRAM(s) Oral every 6 hours PRN For Temp greater than 38 C (100.4 F)  acetaminophen   Tablet. 650 milliGRAM(s) Oral every 6 hours PRN Mild Pain (1 - 3)  HYDROmorphone   Tablet 2 milliGRAM(s) Oral every 4 hours PRN Severe Pain (7 - 10)  lidocaine 2% Viscous 10 milliLiter(s) Swish and Spit every 6 hours PRN Mouth Sores  senna 2 Tablet(s) Oral at bedtime PRN Constipation  Vital Signs Last 24 Hrs  T(C): 36.9 (03 May 2018 06:10), Max: 37.1 (03 May 2018 01:07)  T(F): 98.4 (03 May 2018 06:10), Max: 98.7 (03 May 2018 01:07)  HR: 81 (03 May 2018 06:10) (79 - 83)  BP: 137/67 (03 May 2018 06:10) (136/80 - 143/74)  BP(mean): --  RR: 18 (03 May 2018 06:10) (18 - 20)  SpO2: 94% (03 May 2018 06:10) (94% - 96%)    Vital Signs Last 24 Hrs  T(C): 36.5 (07 May 2018 11:13), Max: 37.3 (06 May 2018 19:15)  T(F): 97.7 (07 May 2018 11:13), Max: 99.1 (06 May 2018 19:15)  HR: 85 (07 May 2018 11:13) (85 - 86)  BP: 138/76 (07 May 2018 11:13) (116/59 - 155/83)  BP(mean): --  RR: 18 (07 May 2018 11:13) (18 - 20)  SpO2: 95% (07 May 2018 11:13) (93% - 96%)          bun 9 creatinine 0.66                        8.1    11.27 )-----------( 235      ( 09 May 2018 09:37 )             26.5

## 2018-05-09 NOTE — PROGRESS NOTE ADULT - ASSESSMENT
no plan for orthopedic surgical intervention per family and patient preference  local wound care per plastics and wound care team to abdomen and knee, VAC placed on thigh wound  PO abx per ID team, need to trend WBC given recent bump that was likely 2/2 abd wound and thigh debridement  encourage patient to spend majority of bed oob in bedside chair as able, has not been OOB in some time, please have PT return on daily basis to try if possible  PT to help mobilize, she must remain 50% wb on the RLE and NO KNEE MOTION allowed, knee immobilizer if oob  coumadin, inr goal 2-3, still supratherapeutic, given difficulty to maintain daily INR within range please consider alternative modality if possible  continue to optimize nutrition  continue to involve psych, remains emotional at times	  appreciate hematology consult for chronic anemia, seems to be 2/2 iron deficiency anemia, would prefer hb>12 if possible to improve oxygen supply for wound healing    Nate Bass MD no plan for orthopedic surgical intervention per family and patient preference  local wound care per plastics and wound care team to abdomen and knee, VAC placed on thigh wound  PO abx per ID team, need to trend WBC given recent bump that was likely 2/2 abd wound and thigh debridement  encourage patient to spend majority of bed oob in bedside chair as able, has not been OOB in some time, please have PT return on daily basis to try if possible  PT to help mobilize, she must remain 50% wb on the RLE and NO KNEE MOTION allowed, knee immobilizer if oob  coumadin, inr goal 2-3, still supratherapeutic, given difficulty to maintain daily INR within range please consider alternative modality if possible  continue to optimize nutrition  continue to involve psych, remains emotional at times	  appreciate hematology consult for anemia 2/2 chronic inflammatory disease, would prefer hb>12 if possible to improve oxygen supply for wound healing, hematology rec epo	    Nate Bass MD

## 2018-05-09 NOTE — PROGRESS NOTE ADULT - SUBJECTIVE AND OBJECTIVE BOX
Patient is a 77y old  Female who presents with a chief complaint of fever (11 Apr 2018 14:25)      INTERVAL HPI/OVERNIGHT EVENTS: none, s/p dressing of thigh wound this am  pain tolerable with meds      T(C): 37.1 (05-09-18 @ 11:38), Max: 37.1 (05-09-18 @ 11:38)  HR: 82 (05-09-18 @ 11:38) (64 - 82)  BP: 119/74 (05-09-18 @ 11:38) (112/65 - 123/73)  RR: 18 (05-09-18 @ 11:38) (18 - 18)  SpO2: 98% (05-09-18 @ 11:38) (93% - 98%)      PHYSICAL EXAM:  GENERAL: NAD  EYES: clear conjunctiva; EOMI  ENMT: Moist mucous membranes  NECK: Supple, No JVD, Normal thyroid  CHEST/LUNG: Clear to auscultation bilaterally; No rales, rhonchi, wheezing, or rubs  HEART: S1, S2, Regular rate and rhythm  ABDOMEN: Soft, Nontender, Nondistended; Bowel sounds present  NEURO: Alert & Oriented X3  EXTREMITIES: No LE edema, no calf tenderness  LYMPH: No lymphadenopathy noted  SKIN: No rashes or lesions    Consultant(s) Notes Reviewed:  [x ] YES  [ ] NO  Care Discussed with Consultants/Other Providers [ x] YES  [ ] NO    LABS:                        8.1    11.27 )-----------( 235      ( 09 May 2018 09:37 )             26.5     05-09    140  |  98  |  17  ----------------------------<  95  3.6   |  29  |  0.72    Ca    9.5      09 May 2018 07:19      PT/INR - ( 09 May 2018 09:30 )   PT: 35.1 sec;   INR: 3.04 ratio             CAPILLARY BLOOD GLUCOSE            RADIOLOGY & ADDITIONAL TESTS:    Imaging Personally Reviewed:  [ ] YES  [ ] NO Patient is a 77y old  Female who presents with a chief complaint of fever (11 Apr 2018 14:25)      INTERVAL HPI/OVERNIGHT EVENTS: none, s/p dressing of thigh wound this am  pain tolerable with meds      T(C): 37.1 (05-09-18 @ 11:38), Max: 37.1 (05-09-18 @ 11:38)  HR: 82 (05-09-18 @ 11:38) (64 - 82)  BP: 119/74 (05-09-18 @ 11:38) (112/65 - 123/73)  RR: 18 (05-09-18 @ 11:38) (18 - 18)  SpO2: 98% (05-09-18 @ 11:38) (93% - 98%)      PHYSICAL EXAM:  GENERAL: NAD  EYES: clear conjunctiva; EOMI  ENMT: Moist mucous membranes  NECK: Supple, No JVD, Normal thyroid  CHEST/LUNG: Clear to auscultation bilaterally; No rales, rhonchi, wheezing, or rubs  HEART: S1, S2, Regular rate and rhythm  ABDOMEN: Soft, Nontender, Nondistended; Bowel sounds present  NEURO: Alert & Oriented X3  EXTREMITIES: rt leg wound-vac, dressing present over lower abd,rt thigh andknee  LYMPH: No lymphadenopathy noted  SKIN: No rashes or lesions    Consultant(s) Notes Reviewed:  [x ] YES  [ ] NO  Care Discussed with Consultants/Other Providers [ x] YES  [ ] NO    LABS:                        8.1    11.27 )-----------( 235      ( 09 May 2018 09:37 )             26.5     05-09    140  |  98  |  17  ----------------------------<  95  3.6   |  29  |  0.72    Ca    9.5      09 May 2018 07:19      PT/INR - ( 09 May 2018 09:30 )   PT: 35.1 sec;   INR: 3.04 ratio             CAPILLARY BLOOD GLUCOSE            RADIOLOGY & ADDITIONAL TESTS:    Imaging Personally Reviewed:  [ ] YES  [ ] NO

## 2018-05-09 NOTE — PROGRESS NOTE ADULT - SUBJECTIVE AND OBJECTIVE BOX
pain controlled, no new events, afebrile    abdominal wound - dressed by plastics  RLE  thigh vac in place  surgical wound intact with stable eschar, some separation of eschar medially from skin, no exudate, dressed by prs/wound care  5/5 ta/ehl/gcs  silt l4-s1  2+ dp pulse

## 2018-05-09 NOTE — PROGRESS NOTE ADULT - ASSESSMENT
A: 77y old  Female who presents with a chief complaint of fever (11 Apr 2018 14:25). Wounds  s/p debridement, will need continued dressing changes TID.    P:  - Please continue with TID Dakins moistened gauze dressing change to abdominal wound.  - Please continue with TID Saline WTD gauze on medial aspect of knee  - VAC change today to groin wound  - Continue care per primary team

## 2018-05-09 NOTE — PROGRESS NOTE ADULT - ATTENDING COMMENTS
as above-  Pulm relatively stable-atelectasis, prior PE, asthma, cardiac Dz  Inhaler regimen as outlined above  DVT rx /prophylaxis  ID follow up of ABX  completed 5/5--then minocycline  (debridement done 5/5)--following WBC   PT evaluation and Psych evaluation    ortho/plastics follow up-knee immobilizer/PT  Rehab pending    Everett Kumar MD-Pulmonary   [phone] as above-  Pulm relatively stable-atelectasis, prior PE, asthma, cardiac Dz  Inhaler regimen as outlined above  DVT rx /prophylaxis  ID follow up of ABX  completed 5/5--then minocycline  (debridement done 5/5)--following WBC   PT evaluation and Psych evaluation    ortho/plastics follow up-knee immobilizer/PT  Rehab pending-pulm. cleared    Everett Kumar MD-Pulmonary   [phone]

## 2018-05-09 NOTE — PROGRESS NOTE ADULT - ASSESSMENT
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 cultures NTD. fever resolved.     # Recent septic Rt TKR - abx spacer exchanged and plastics complex wound closure  # PMR on chronic steroids  # thigh wound,   # right knee eschar, right thigh necrosis and abd wound skin breakdown    Plan:  appreciate plastics discussion with family, cont local wound care as family declining surgery  sp multiple debridements, cont wet to dry dressing abd and knee wounds TID  wound vac for thigh wound  remains afebrile, cont suppressive minocycline, appreciate ID recs  appreciate psych eval  appreciate ortho recs  heme follow up  will cont coumadin and asa  pain control on oxy IR 10mg BID and dilaudid 2mg q4 prn  PT OOB encourage ambulation, dw patient importance of turning and trying to get out of bed  bowel regimen    plan of care dw patient

## 2018-05-10 LAB
INR BLD: 3.02 RATIO — HIGH (ref 0.88–1.16)
PROTHROM AB SERPL-ACNC: 34.9 SEC — HIGH (ref 10–13.1)

## 2018-05-10 PROCEDURE — 99232 SBSQ HOSP IP/OBS MODERATE 35: CPT

## 2018-05-10 RX ORDER — HYDROMORPHONE HYDROCHLORIDE 2 MG/ML
0.5 INJECTION INTRAMUSCULAR; INTRAVENOUS; SUBCUTANEOUS ONCE
Qty: 0 | Refills: 0 | Status: DISCONTINUED | OUTPATIENT
Start: 2018-05-10 | End: 2018-05-10

## 2018-05-10 RX ADMIN — Medication 500000 UNIT(S): at 12:37

## 2018-05-10 RX ADMIN — NYSTATIN CREAM 1 APPLICATION(S): 100000 CREAM TOPICAL at 12:37

## 2018-05-10 RX ADMIN — TIOTROPIUM BROMIDE 1 CAPSULE(S): 18 CAPSULE ORAL; RESPIRATORY (INHALATION) at 12:36

## 2018-05-10 RX ADMIN — MINOCYCLINE HYDROCHLORIDE 100 MILLIGRAM(S): 45 TABLET, EXTENDED RELEASE ORAL at 06:42

## 2018-05-10 RX ADMIN — Medication 500000 UNIT(S): at 05:31

## 2018-05-10 RX ADMIN — HYDROMORPHONE HYDROCHLORIDE 2 MILLIGRAM(S): 2 INJECTION INTRAMUSCULAR; INTRAVENOUS; SUBCUTANEOUS at 10:42

## 2018-05-10 RX ADMIN — Medication 1 TABLET(S): at 06:33

## 2018-05-10 RX ADMIN — Medication 500000 UNIT(S): at 17:44

## 2018-05-10 RX ADMIN — HYDROMORPHONE HYDROCHLORIDE 0.5 MILLIGRAM(S): 2 INJECTION INTRAMUSCULAR; INTRAVENOUS; SUBCUTANEOUS at 06:45

## 2018-05-10 RX ADMIN — HYDROMORPHONE HYDROCHLORIDE 2 MILLIGRAM(S): 2 INJECTION INTRAMUSCULAR; INTRAVENOUS; SUBCUTANEOUS at 18:11

## 2018-05-10 RX ADMIN — HYDROMORPHONE HYDROCHLORIDE 0.5 MILLIGRAM(S): 2 INJECTION INTRAMUSCULAR; INTRAVENOUS; SUBCUTANEOUS at 06:26

## 2018-05-10 RX ADMIN — Medication 1 TABLET(S): at 12:36

## 2018-05-10 RX ADMIN — BUDESONIDE AND FORMOTEROL FUMARATE DIHYDRATE 2 PUFF(S): 160; 4.5 AEROSOL RESPIRATORY (INHALATION) at 17:44

## 2018-05-10 RX ADMIN — HYDROMORPHONE HYDROCHLORIDE 2 MILLIGRAM(S): 2 INJECTION INTRAMUSCULAR; INTRAVENOUS; SUBCUTANEOUS at 18:32

## 2018-05-10 RX ADMIN — PANTOPRAZOLE SODIUM 40 MILLIGRAM(S): 20 TABLET, DELAYED RELEASE ORAL at 05:31

## 2018-05-10 RX ADMIN — Medication 100 MILLIGRAM(S): at 06:41

## 2018-05-10 RX ADMIN — Medication 1 APPLICATION(S): at 12:37

## 2018-05-10 RX ADMIN — HYDROMORPHONE HYDROCHLORIDE 2 MILLIGRAM(S): 2 INJECTION INTRAMUSCULAR; INTRAVENOUS; SUBCUTANEOUS at 11:00

## 2018-05-10 RX ADMIN — Medication 1 APPLICATION(S): at 06:33

## 2018-05-10 RX ADMIN — Medication 1 TABLET(S): at 12:39

## 2018-05-10 RX ADMIN — Medication 1 MILLIGRAM(S): at 12:36

## 2018-05-10 RX ADMIN — MINOCYCLINE HYDROCHLORIDE 100 MILLIGRAM(S): 45 TABLET, EXTENDED RELEASE ORAL at 17:44

## 2018-05-10 RX ADMIN — BUDESONIDE AND FORMOTEROL FUMARATE DIHYDRATE 2 PUFF(S): 160; 4.5 AEROSOL RESPIRATORY (INHALATION) at 05:31

## 2018-05-10 RX ADMIN — Medication 81 MILLIGRAM(S): at 12:37

## 2018-05-10 RX ADMIN — Medication 3 MILLIGRAM(S): at 21:36

## 2018-05-10 RX ADMIN — Medication 500 MILLIGRAM(S): at 12:37

## 2018-05-10 RX ADMIN — Medication 325 MILLIGRAM(S): at 12:37

## 2018-05-10 RX ADMIN — ZINC OXIDE 1 APPLICATION(S): 200 OINTMENT TOPICAL at 12:38

## 2018-05-10 NOTE — PROGRESS NOTE ADULT - ASSESSMENT
Patient with infected right tkr s/p rudy, spacer for strept infection, had poor wound healing so returned 3 months after and had spacer exchange and complex wound closure with mrsa infection found. She is about 2 weeks into dapto and returns with fever, leukocytosis, ongoing pain and eschar of right knee wound and what appears to be a hematoma of the right thigh. INR has been up so that is good reason for the thigh findings. Must consider infected hematoma possible. UTI is possible. Pneumonia is possible given basilar rales. retroperitoneal hematoma possible .  PMR decompensation or adrenal insufficiency a consideration at well.    Pulmonary stable relative to prior admits --  wound care in progress  Completed IV ABX-5/5 -42 days  Debridement done-5/5  Vac placed 5/7 Patient with infected right tkr s/p rudy, spacer for strept infection, had poor wound healing so returned 3 months after and had spacer exchange and complex wound closure with mrsa infection found. She is about 2 weeks into dapto and returns with fever, leukocytosis, ongoing pain and eschar of right knee wound and what appears to be a hematoma of the right thigh. INR has been up so that is good reason for the thigh findings. Must consider infected hematoma possible. UTI is possible. Pneumonia is possible given basilar rales. retroperitoneal hematoma possible .  PMR decompensation or adrenal insufficiency a consideration at well.    Pulmonary stable relative to prior admits --  wound care in progress  Completed IV ABX-5/5 -42 days  Debridement done-5/5  Vac placed 5/7-slow progress

## 2018-05-10 NOTE — PROGRESS NOTE ADULT - ASSESSMENT
no plan for orthopedic surgical intervention per family and patient preference  local wound care per plastics and wound care team to abdomen and knee, VAC placed on thigh wound  PO abx per ID team  encourage patient to spend majority of bed oob in bedside chair as able, has not been OOB in some time  PT to help mobilize, she must remain 50% wb on the RLE and NO KNEE MOTION allowed, knee immobilizer if oob  coumadin, inr goal 2-3, still supratherapeutic, given difficulty to maintain daily INR within range please consider alternative modality if possible  continue to optimize nutrition  continue to involve psych, remains emotional at times	  appreciate hematology consult for anemia 2/2 chronic inflammatory disease, would prefer hb>12 if possible to improve oxygen supply for wound healing, hematology rec epo	  dispo planning when deemed optimized per prs/wound care    Nate Bass MD

## 2018-05-10 NOTE — PROGRESS NOTE ADULT - ATTENDING COMMENTS
as above-  Pulm relatively stable-atelectasis, prior PE, asthma, cardiac Dz  Inhaler regimen as outlined above  DVT rx /prophylaxis  ID follow up of ABX  completed 5/5--then minocycline  (debridement done 5/5)--following WBC   PT evaluation and Psych evaluation    ortho/plastics follow up-knee immobilizer/PT  Rehab pending-pulm. cleared    Everett Kumar MD-Pulmonary   [phone] as above-  Pulm relatively stable-atelectasis, prior PE, asthma, cardiac Dz  Inhaler regimen as outlined above  DVT rx /prophylaxis  ID follow up of ABX  completed 5/5--then minocycline  (debridement done 5/5)--following WBC   PT evaluation and Psych evaluation    ortho/plastics follow up-knee immobilizer/PT  Rehab pending-pulm. cleared for DC to rehab.    Everett Kumar MD-Pulmonary   [phone]

## 2018-05-10 NOTE — PROGRESS NOTE ADULT - SUBJECTIVE AND OBJECTIVE BOX
CHIEF COMPLAINT:    Interval Events:    REVIEW OF SYSTEMS:  Constitutional: No fevers or chills. No weight loss. No fatigue or generalized malaise.  Eyes: No itching or discharge from the eyes  ENT: No ear pain. No ear discharge. No nasal congestion. No post nasal drip. No epistaxis. No throat pain. No sore throat. No difficulty swallowing.   CV: No chest pain. No palpitations. No lightheadedness or dizziness.   Resp: No dyspnea at rest. No dyspnea on exertion. No orthopnea. No wheezing. No cough. No stridor. No sputum production. No chest pain with respiration.  GI: No nausea. No vomiting. No diarrhea.  MSK: No joint pain or pain in any extremities  Integumentary: No skin lesions. No pedal edema.  Neurological: No gross motor weakness. No sensory changes.  [ ] All other systems negative  [ ] Unable to assess ROS because ________    OBJECTIVE:  ICU Vital Signs Last 24 Hrs  T(C): 36.7 (10 May 2018 04:11), Max: 37.1 (09 May 2018 11:38)  T(F): 98 (10 May 2018 04:11), Max: 98.7 (09 May 2018 11:38)  HR: 89 (10 May 2018 04:11) (82 - 97)  BP: 128/62 (10 May 2018 04:11) (119/74 - 150/69)  BP(mean): --  ABP: --  ABP(mean): --  RR: 18 (10 May 2018 04:11) (18 - 18)  SpO2: 98% (10 May 2018 04:11) (98% - 99%)        05-08 @ 07:01 - 05-09 @ 07:00  --------------------------------------------------------  IN: 660 mL / OUT: 300 mL / NET: 360 mL    05-09 @ 07:01  -  05-10 @ 05:22  --------------------------------------------------------  IN: 1020 mL / OUT: 625 mL / NET: 395 mL      CAPILLARY BLOOD GLUCOSE          PHYSICAL EXAM:  General: Awake, alert, oriented X 3.   HEENT: Atraumatic, normocephalic.                 Mallampatti Grade                 No nasal congestion.                No tonsillar or pharyngeal exudates.  Lymph Nodes: No palpable lymphadenopathy  Neck: No JVD. No carotid bruit.   Respiratory: Normal chest expansion                         Normal percussion                         Normal and equal air entry                         No wheeze, rhonchi or rales.  Cardiovascular: S1 S2 normal. No murmurs, rubs or gallops.   Abdomen: Soft, non-tender, non-distended. No organomegaly.  Extremities: Warm to touch. Peripheral pulse palpable. No pedal edema.   Skin: No rashes or skin lesions  Neurological: Motor and sensory examination equal and normal in all four extremities.  Psychiatry: Appropriate mood and affect.    HOSPITAL MEDICATIONS:  MEDICATIONS  (STANDING):  ascorbic acid 500 milliGRAM(s) Oral daily  aspirin enteric coated 81 milliGRAM(s) Oral daily  buDESOnide 160 MICROgram(s)/formoterol 4.5 MICROgram(s) Inhaler 2 Puff(s) Inhalation two times a day  calcium carbonate 1250 mG + Vitamin D (OsCal 500 + D) 1 Tablet(s) Oral three times a day  Dakins Solution - 1/4 Strength 1 Application(s) Topical three times a day  docusate sodium 100 milliGRAM(s) Oral three times a day  ferrous    sulfate 325 milliGRAM(s) Oral daily  folic acid 1 milliGRAM(s) Oral daily  lidocaine 1%/EPINEPHrine 1:100,000 Inj 10 milliLiter(s) Local Injection once  melatonin 3 milliGRAM(s) Oral at bedtime  minocycline 100 milliGRAM(s) Oral two times a day  multivitamin 1 Tablet(s) Oral daily  nystatin    Suspension 232243 Unit(s) Oral every 6 hours  nystatin Powder 1 Application(s) Topical two times a day  pantoprazole    Tablet 40 milliGRAM(s) Oral before breakfast  polyethylene glycol 3350 17 Gram(s) Oral daily  tiotropium 18 MICROgram(s) Capsule 1 Capsule(s) Inhalation daily  zinc oxide 20% Ointment 1 Application(s) Topical daily    MEDICATIONS  (PRN):  acetaminophen   Tablet 650 milliGRAM(s) Oral every 6 hours PRN For Temp greater than 38 C (100.4 F)  acetaminophen   Tablet. 650 milliGRAM(s) Oral every 6 hours PRN Mild Pain (1 - 3)  bisacodyl Suppository 10 milliGRAM(s) Rectal daily PRN Constipation  HYDROmorphone   Tablet 2 milliGRAM(s) Oral every 4 hours PRN Severe Pain (7 - 10)  lidocaine 2% Viscous 10 milliLiter(s) Swish and Spit every 6 hours PRN Mouth Sores  senna 2 Tablet(s) Oral at bedtime PRN Constipation      LABS:                        8.1    11.27 )-----------( 235      ( 09 May 2018 09:37 )             26.5     05-09    140  |  98  |  17  ----------------------------<  95  3.6   |  29  |  0.72    Ca    9.5      09 May 2018 07:19      PT/INR - ( 09 May 2018 09:30 )   PT: 35.1 sec;   INR: 3.04 ratio                   MICROBIOLOGY:     RADIOLOGY:  [ ] Reviewed and interpreted by me    Point of Care Ultrasound Findings:    PFT:    EKG: CHIEF COMPLAINT: emotional-slightly better--no sob    Interval Events: ID, ortho and plastics    REVIEW OF SYSTEMS:  Constitutional: No fevers or chills. No weight loss. No fatigue or generalized malaise.  Eyes: No itching or discharge from the eyes  ENT: No ear pain. No ear discharge. No nasal congestion. No post nasal drip. No epistaxis. No throat pain. No sore throat. No difficulty swallowing.   CV: No chest pain. No palpitations. No lightheadedness or dizziness.   Resp: No dyspnea at rest. No dyspnea on exertion. No orthopnea. No wheezing. No cough. No stridor. No sputum production. No chest pain with respiration.  GI: No nausea. No vomiting. No diarrhea.  MSK: No joint pain or pain in any extremities- except RLL  Integumentary: No skin lesions. No pedal edema.  Neurological: No gross motor weakness. No sensory changes.  [+ ] All other systems negative  [ ] Unable to assess ROS because ________    OBJECTIVE:  ICU Vital Signs Last 24 Hrs  T(C): 36.7 (10 May 2018 04:11), Max: 37.1 (09 May 2018 11:38)  T(F): 98 (10 May 2018 04:11), Max: 98.7 (09 May 2018 11:38)  HR: 89 (10 May 2018 04:11) (82 - 97)  BP: 128/62 (10 May 2018 04:11) (119/74 - 150/69)  BP(mean): --  ABP: --  ABP(mean): --  RR: 18 (10 May 2018 04:11) (18 - 18)  SpO2: 98% (10 May 2018 04:11) (98% - 99%)        05-08 @ 07:01  -  05-09 @ 07:00  --------------------------------------------------------  IN: 660 mL / OUT: 300 mL / NET: 360 mL    05-09 @ 07:01  -  05-10 @ 05:22  --------------------------------------------------------  IN: 1020 mL / OUT: 625 mL / NET: 395 mL      CAPILLARY BLOOD GLUCOSE          PHYSICAL EXAM: NAD   General: Awake, alert, oriented X 3.   HEENT: Atraumatic, normocephalic.                 Mallampatti Grade 3                No nasal congestion.                No tonsillar or pharyngeal exudates.  Lymph Nodes: No palpable lymphadenopathy  Neck: No JVD. No carotid bruit.   Respiratory: abnormal chest expansion-reduced at bases                         Normal percussion                         Normal and equal air entry                         No wheeze, rhonchi or rales.  Cardiovascular: S1 S2 normal. No murmurs, rubs or gallops.   Abdomen: Soft, non-tender, non-distended. No organomegaly.  Extremities: Warm to touch. Peripheral pulse palpable. No pedal edema. Wound vac in place  Skin: No rashes or skin lesions  Neurological: Motor and sensory examination equal and normal in all four extremities.  Psychiatry: Appropriate mood and affect.    HOSPITAL MEDICATIONS:  MEDICATIONS  (STANDING):  ascorbic acid 500 milliGRAM(s) Oral daily  aspirin enteric coated 81 milliGRAM(s) Oral daily  buDESOnide 160 MICROgram(s)/formoterol 4.5 MICROgram(s) Inhaler 2 Puff(s) Inhalation two times a day  calcium carbonate 1250 mG + Vitamin D (OsCal 500 + D) 1 Tablet(s) Oral three times a day  Dakins Solution - 1/4 Strength 1 Application(s) Topical three times a day  docusate sodium 100 milliGRAM(s) Oral three times a day  ferrous    sulfate 325 milliGRAM(s) Oral daily  folic acid 1 milliGRAM(s) Oral daily  lidocaine 1%/EPINEPHrine 1:100,000 Inj 10 milliLiter(s) Local Injection once  melatonin 3 milliGRAM(s) Oral at bedtime  minocycline 100 milliGRAM(s) Oral two times a day  multivitamin 1 Tablet(s) Oral daily  nystatin    Suspension 536060 Unit(s) Oral every 6 hours  nystatin Powder 1 Application(s) Topical two times a day  pantoprazole    Tablet 40 milliGRAM(s) Oral before breakfast  polyethylene glycol 3350 17 Gram(s) Oral daily  tiotropium 18 MICROgram(s) Capsule 1 Capsule(s) Inhalation daily  zinc oxide 20% Ointment 1 Application(s) Topical daily    MEDICATIONS  (PRN):  acetaminophen   Tablet 650 milliGRAM(s) Oral every 6 hours PRN For Temp greater than 38 C (100.4 F)  acetaminophen   Tablet. 650 milliGRAM(s) Oral every 6 hours PRN Mild Pain (1 - 3)  bisacodyl Suppository 10 milliGRAM(s) Rectal daily PRN Constipation  HYDROmorphone   Tablet 2 milliGRAM(s) Oral every 4 hours PRN Severe Pain (7 - 10)  lidocaine 2% Viscous 10 milliLiter(s) Swish and Spit every 6 hours PRN Mouth Sores  senna 2 Tablet(s) Oral at bedtime PRN Constipation      LABS:                        8.1    11.27 )-----------( 235      ( 09 May 2018 09:37 )             26.5     05-09    140  |  98  |  17  ----------------------------<  95  3.6   |  29  |  0.72    Ca    9.5      09 May 2018 07:19      PT/INR - ( 09 May 2018 09:30 )   PT: 35.1 sec;   INR: 3.04 ratio                   MICROBIOLOGY:     RADIOLOGY:  [ ] Reviewed and interpreted by me    Point of Care Ultrasound Findings:    PFT:    EKG:

## 2018-05-10 NOTE — PROGRESS NOTE ADULT - SUBJECTIVE AND OBJECTIVE BOX
PLASTIC SURGERY DAILY PROGRESS NOTE:    Subjective:  Patient seen and examined. Yesterday, she refused her evening dressing change.     Objective:    Vital Signs Last 24 Hrs  T(C): 36.7 (10 May 2018 04:11), Max: 37.1 (09 May 2018 11:38)  T(F): 98 (10 May 2018 04:11), Max: 98.7 (09 May 2018 11:38)  HR: 89 (10 May 2018 04:11) (82 - 97)  BP: 128/62 (10 May 2018 04:11) (119/74 - 150/69)  BP(mean): --  RR: 18 (10 May 2018 04:11) (18 - 18)  SpO2: 98% (10 May 2018 04:11) (98% - 99%)    I&O's Detail    09 May 2018 07:01  -  10 May 2018 07:00  --------------------------------------------------------  IN:    Oral Fluid: 1020 mL  Total IN: 1020 mL    OUT:    Voided: 625 mL  Total OUT: 625 mL    Total NET: 395 mL    MEDICATIONS  (STANDING):  ascorbic acid 500 milliGRAM(s) Oral daily  aspirin enteric coated 81 milliGRAM(s) Oral daily  buDESOnide 160 MICROgram(s)/formoterol 4.5 MICROgram(s) Inhaler 2 Puff(s) Inhalation two times a day  calcium carbonate 1250 mG + Vitamin D (OsCal 500 + D) 1 Tablet(s) Oral three times a day  Dakins Solution - 1/4 Strength 1 Application(s) Topical three times a day  docusate sodium 100 milliGRAM(s) Oral three times a day  ferrous    sulfate 325 milliGRAM(s) Oral daily  folic acid 1 milliGRAM(s) Oral daily  lidocaine 1%/EPINEPHrine 1:100,000 Inj 10 milliLiter(s) Local Injection once  melatonin 3 milliGRAM(s) Oral at bedtime  minocycline 100 milliGRAM(s) Oral two times a day  multivitamin 1 Tablet(s) Oral daily  nystatin    Suspension 904497 Unit(s) Oral every 6 hours  nystatin Powder 1 Application(s) Topical two times a day  pantoprazole    Tablet 40 milliGRAM(s) Oral before breakfast  polyethylene glycol 3350 17 Gram(s) Oral daily  tiotropium 18 MICROgram(s) Capsule 1 Capsule(s) Inhalation daily  zinc oxide 20% Ointment 1 Application(s) Topical daily    MEDICATIONS  (PRN):  acetaminophen   Tablet 650 milliGRAM(s) Oral every 6 hours PRN For Temp greater than 38 C (100.4 F)  acetaminophen   Tablet. 650 milliGRAM(s) Oral every 6 hours PRN Mild Pain (1 - 3)  bisacodyl Suppository 10 milliGRAM(s) Rectal daily PRN Constipation  HYDROmorphone   Tablet 2 milliGRAM(s) Oral every 4 hours PRN Severe Pain (7 - 10)  lidocaine 2% Viscous 10 milliLiter(s) Swish and Spit every 6 hours PRN Mouth Sores  senna 2 Tablet(s) Oral at bedtime PRN Constipation    PHYSICAL EXAM:    General: Resting in bed  MSK: R upper thigh with VAC in place holding suction.  Knee with stable eschar with medial area open, wound base appears clean, no purulence, wet-to-dry dressing applied  Abdomen: Infra-Umbilical wound with tracking, packed with Dakins wet-to-dry gauze.  Base of wound appears clean without purulence; fibrinous exudate noted

## 2018-05-10 NOTE — PROGRESS NOTE ADULT - SUBJECTIVE AND OBJECTIVE BOX
Pt is a 77 year old female who was admitted several weeks ago with aspiration pneumonia and fever with right knee pain. She also had proximal thigh swelling and there was a hematoma of the leg as well. She has a history of PMR on steroids. She also has a history of tavr, AICD andf remote bilateral TKR and was found to have strep bacteremia and a ERIKA was negative and she also had a eschar of the right knee as well. She was placed on iv antibiotics and I understand that she will be completing the antibiotics, daptomycin on this date. She told me that she had anemia in the distant past and was placed on antibiotics. She is being seen by the plastics service as well. A ct of th femur was not remarkable for infection and a ct of the chest done several weeks ago showed bibasilar PNA. She was also noted to have a left renal lesion that is unchanged now since 12/2016.     The consult was requested to see if there is anything we can do for her anemia. The counts on the day of this admission was white cell count of 9.71 hemoglobin 8.4 hematocrit 26.4 MCV 86 and MCHC 31.8 and platelet count of [id number] the diff count was 74 polys 15 lymphs and 7 monos the bun was 9 and the creatinine was 0.6     5/4 the pt is stable and the iron studies are still pending at this time. The hemoglobin was 8.4. 5/7 the pt was seen and the notes over charu last few days. The pt will in the am have ferritin and iron studies sent off. 5/8 the ferritin came back as over 300 and therefore she is not iron deficient and the anemia is consistent with chronic disease. The use of epogen might be considered here if hemoglobin drops and can be implemented to reduce blood transfusions. 5/9 pt clearly looks better and repeat hemoglobin was stable at 8.1  PE weak appearing with female and needed help to do normal daily activities large eschar as described on the lower leg and at the time of my arrival, the wound vac was in place and she was being cared for by the staff.  5/8 the ferritin came back at over 300 and therefore she is not iron deficient and epo can be considered if the hemoglobin drops and is in need of blood support. 5/10 pt being evaluated for rehab and consider epo of hemoglobin drops below the 8 range.  pmh as above in the body of the report  FH/SH no  contrib  MEDICATIONS  (STANDING):  ascorbic acid 500 milliGRAM(s) Oral daily  aspirin enteric coated 81 milliGRAM(s) Oral daily  buDESOnide 160 MICROgram(s)/formoterol 4.5 MICROgram(s) Inhaler 2 Puff(s) Inhalation two times a day  calcium carbonate 1250 mG + Vitamin D (OsCal 500 + D) 1 Tablet(s) Oral three times a day  Dakins Solution - 1/4 Strength 1 Application(s) Topical three times a day  docusate sodium 100 milliGRAM(s) Oral three times a day  ferrous    sulfate 325 milliGRAM(s) Oral daily  folic acid 1 milliGRAM(s) Oral daily  lidocaine 1%/EPINEPHrine 1:100,000 Inj 10 milliLiter(s) Local Injection once  melatonin 3 milliGRAM(s) Oral at bedtime  minocycline 100 milliGRAM(s) Oral two times a day  multivitamin 1 Tablet(s) Oral daily  nystatin    Suspension 239294 Unit(s) Oral every 6 hours  nystatin Powder 1 Application(s) Topical two times a day  pantoprazole    Tablet 40 milliGRAM(s) Oral before breakfast  polyethylene glycol 3350 17 Gram(s) Oral daily  tiotropium 18 MICROgram(s) Capsule 1 Capsule(s) Inhalation daily  zinc oxide 20% Ointment 1 Application(s) Topical daily  ICU Vital Signs Last 24 Hrs  T(C): 37.1 (09 May 2018 11:38), Max: 37.1 (09 May 2018 11:38)  T(F): 98.7 (09 May 2018 11:38), Max: 98.7 (09 May 2018 11:38)  HR: 82 (09 May 2018 11:38) (64 - 82)  BP: 119/74 (09 May 2018 11:38) (112/65 - 123/73)  BP(mean): --  ABP: --  ABP(mean): --  RR: 18 (09 May 2018 11:38) (18 - 18)  SpO2: 98% (09 May 2018 11:38) (93% - 98%)    MEDICATIONS  (PRN):  acetaminophen   Tablet 650 milliGRAM(s) Oral every 6 hours PRN For Temp greater than 38 C (100.4 F)  acetaminophen   Tablet. 650 milliGRAM(s) Oral every 6 hours PRN Mild Pain (1 - 3)  bisacodyl Suppository 10 milliGRAM(s) Rectal daily PRN Constipation  HYDROmorphone   Tablet 2 milliGRAM(s) Oral every 4 hours PRN Severe Pain (7 - 10)  lidocaine 2% Viscous 10 milliLiter(s) Swish and Spit every 6 hours PRN Mouth Sores  senna 2 Tablet(s) Oral at bedtime PRN Constipation  Vital Signs Last 24 Hrs  T(C): 36.9 (08 May 2018 06:20), Max: 36.9 (08 May 2018 06:20)  T(F): 98.5 (08 May 2018 06:20), Max: 98.5 (08 May 2018 06:20)  HR: 77 (08 May 2018 06:20) (77 - 80)  BP: 110/65 (08 May 2018 06:20) (110/65 - 144/78)  BP(mean): --  RR: 19 (08 May 2018 06:20) (18 - 19)  SpO2: 95% (08 May 2018 06:20) (95% - 95%)  MEDICATIONS  (PRN):  acetaminophen   Tablet 650 milliGRAM(s) Oral every 6 hours PRN For Temp greater than 38 C (100.4 F)  acetaminophen   Tablet. 650 milliGRAM(s) Oral every 6 hours PRN Mild Pain (1 - 3)  HYDROmorphone   Tablet 2 milliGRAM(s) Oral every 4 hours PRN Severe Pain (7 - 10)  lidocaine 2% Viscous 10 milliLiter(s) Swish and Spit every 6 hours PRN Mouth Sores  senna 2 Tablet(s) Oral at bedtime PRN Constipation  Vital Signs Last 24 Hrs  T(C): 36.9 (03 May 2018 06:10), Max: 37.1 (03 May 2018 01:07)  T(F): 98.4 (03 May 2018 06:10), Max: 98.7 (03 May 2018 01:07)  HR: 81 (03 May 2018 06:10) (79 - 83)  BP: 137/67 (03 May 2018 06:10) (136/80 - 143/74)  BP(mean): --  RR: 18 (03 May 2018 06:10) (18 - 20)  SpO2: 94% (03 May 2018 06:10) (94% - 96%)    Vital Signs Last 24 Hrs  T(C): 36.5 (07 May 2018 11:13), Max: 37.3 (06 May 2018 19:15)  T(F): 97.7 (07 May 2018 11:13), Max: 99.1 (06 May 2018 19:15)  HR: 85 (07 May 2018 11:13) (85 - 86)  BP: 138/76 (07 May 2018 11:13) (116/59 - 155/83)  BP(mean): --  RR: 18 (07 May 2018 11:13) (18 - 20)  SpO2: 95% (07 May 2018 11:13) (93% - 96%)          bun 9 creatinine 0.66                        8.1    11.27 )-----------( 235      ( 09 May 2018 09:37 )             26.5

## 2018-05-10 NOTE — PROGRESS NOTE ADULT - ASSESSMENT
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 cultures NTD. fever resolved.     # Recent septic Rt TKR - abx spacer exchanged and plastics complex wound closure  # PMR on chronic steroids  # thigh wound,   # right knee eschar, right thigh necrosis and abd wound skin breakdown    Plan:  appreciate plastics discussion with family, cont local wound care as family declining surgery  sp multiple debridements, cont wet to dry dressing abd and knee wounds TID  wound vac for thigh wound  remains afebrile, cont suppressive minocycline, appreciate ID recs  appreciate psych eval  appreciate ortho recs  heme follow up  will cont coumadin and asa  pain control on oxy IR 10mg BID and dilaudid 2mg q4 prn  PT OOB encourage ambulation, dw patient importance of turning and trying to get out of bed  bowel regimen    plan of care dw patient

## 2018-05-10 NOTE — PROGRESS NOTE ADULT - SUBJECTIVE AND OBJECTIVE BOX
Patient is a 77y old  Female who presents with a chief complaint of fever (11 Apr 2018 14:25)      INTERVAL HPI/OVERNIGHT EVENTS: noted, feels well      Vital Signs Last 24 Hrs  T(C): 36.8 (10 May 2018 13:53), Max: 36.8 (09 May 2018 20:16)  T(F): 98.3 (10 May 2018 13:53), Max: 98.3 (10 May 2018 13:53)  HR: 81 (10 May 2018 13:53) (81 - 97)  BP: 101/61 (10 May 2018 13:53) (101/61 - 150/69)  BP(mean): --  RR: 18 (10 May 2018 13:53) (18 - 18)  SpO2: 93% (10 May 2018 13:53) (93% - 99%)    acetaminophen   Tablet 650 milliGRAM(s) Oral every 6 hours PRN  acetaminophen   Tablet. 650 milliGRAM(s) Oral every 6 hours PRN  ascorbic acid 500 milliGRAM(s) Oral daily  aspirin enteric coated 81 milliGRAM(s) Oral daily  bisacodyl Suppository 10 milliGRAM(s) Rectal daily PRN  buDESOnide 160 MICROgram(s)/formoterol 4.5 MICROgram(s) Inhaler 2 Puff(s) Inhalation two times a day  calcium carbonate 1250 mG + Vitamin D (OsCal 500 + D) 1 Tablet(s) Oral three times a day  Dakins Solution - 1/4 Strength 1 Application(s) Topical three times a day  docusate sodium 100 milliGRAM(s) Oral three times a day  ferrous    sulfate 325 milliGRAM(s) Oral daily  folic acid 1 milliGRAM(s) Oral daily  HYDROmorphone   Tablet 2 milliGRAM(s) Oral every 4 hours PRN  lidocaine 1%/EPINEPHrine 1:100,000 Inj 10 milliLiter(s) Local Injection once  lidocaine 2% Viscous 10 milliLiter(s) Swish and Spit every 6 hours PRN  melatonin 3 milliGRAM(s) Oral at bedtime  minocycline 100 milliGRAM(s) Oral two times a day  multivitamin 1 Tablet(s) Oral daily  nystatin    Suspension 000863 Unit(s) Oral every 6 hours  nystatin Powder 1 Application(s) Topical two times a day  pantoprazole    Tablet 40 milliGRAM(s) Oral before breakfast  polyethylene glycol 3350 17 Gram(s) Oral daily  senna 2 Tablet(s) Oral at bedtime PRN  tiotropium 18 MICROgram(s) Capsule 1 Capsule(s) Inhalation daily  zinc oxide 20% Ointment 1 Application(s) Topical daily      PHYSICAL EXAM:  GENERAL: NAD,   EYES: conjunctiva and sclera clear  ENMT: Moist mucous membranes  NECK: Supple, No JVD, Normal thyroid  NERVOUS SYSTEM:  Alert & Oriented X3,   CHEST/LUNG: Clear to auscultation bilaterally; No rales, rhonchi, wheezing, or rubs  HEART: Regular rate and rhythm; No murmurs, rubs, or gallops  ABDOMEN: Soft, Nontender, Nondistended; Bowel sounds present, lower abd wound-packing  EXTREMITIES:  rt LE dressing  LYMPH: No lymphadenopathy noted  SKIN: No rashes or lesions    Consultant(s) Notes Reviewed:  [x ] YES  [ ] NO  Care Discussed with Consultants/Other Providers [ x] YES  [ ] NO    LABS:                        8.1    11.27 )-----------( 235      ( 09 May 2018 09:37 )             26.5     05-09    140  |  98  |  17  ----------------------------<  95  3.6   |  29  |  0.72    Ca    9.5      09 May 2018 07:19      PT/INR - ( 10 May 2018 08:04 )   PT: 34.9 sec;   INR: 3.02 ratio             CAPILLARY BLOOD GLUCOSE                RADIOLOGY & ADDITIONAL TESTS:    Imaging Personally Reviewed:  [ ] YES  [ ] NO

## 2018-05-10 NOTE — PROGRESS NOTE ADULT - ASSESSMENT
77 year old female who was admitted several weeks ago with aspiration pneumonia and fever with right knee pain. She also had proximal thigh swelling and there was a hematoma of the leg as well. She has a history of PMR on steroids. She also has a history of tavr, AICD andf remote bilateral TKR and was found to have strep bacteremia and a ERIKA was negative and she also had a eschar of the right knee as well. She was placed on iv antibiotics and I understand that she will be completing the antibiotics, daptomycin on this date. She told me that she had anemia in the distant past and was placed on antibiotics. She is being seen by the plastics service as well. A ct of th femur was not remarkable for infection and a ct of the chest done several weeks ago showed bibasilar PNA. She was also noted to have a left renal lesion that is unchanged now since 12/2016.     The consult was requested to see if there is anything we can do for her anemia. The counts on the day of this admission was white cell count of 9.71 hemoglobin 8.4 hematocrit 26.4 MCV 86 and MCHC 31.8 and platelet count of [id number] the diff count was 74 polys 15 lymphs and 7 monos the bun was 9 and the creatinine was 0.6     THe anemia as described is likely from anemia of inflammation. I would send off serum iron tibc and retic count and ferritin with a guaiac. She may be a candidate for epo to reduce transfusion needs, will follow.     Would send off iron studies, serum iron tibc and ferritin  The hemoglobin on 4/7 was 8.1 and iron studies were ordered for the am by me. She may be a candidate for epo if transfusions become a problem.   5/8 the ferritin came back at over 300 and therefore she is not iron deficient and epo can be considered if the hemoglobin drops and is in need of blood support.   5/9 pt looking better and the pt was stable and the hemoglobin was at 8.1.   The pt is a 54 year old female with a history of DM2, htn, fibroids, HIV for 18 years on retroviral therapy with absent viral load, was diagnosed over a year ago with left sided breast cancer and had a lumpectomy and was treated with chemotherapy and just completed Herceptin and Perjeta.  She has a past that includes DVT and ivc filter and she presented here with weakness for a period of 1 week in the upper and lower extremities. She was also having gait problems as well.. The pt was brought into the ER and the brain MRI showed a 2.8 cm x 2.0 cm x 2.4 cm right posterior frontal lobe mass and there was a non specific mass in the high parietal area of the brain on the left. there was a partially calcific non specific mass in the high left left parietal region. There was also a 0.9 cm cystic lesion in the anterior left frontal lobe as well.     A ct of the chest abdomen and the pelvis was negative for tumor and the alk phos was normal. The pt stated that at times she was having low grade temps. The pt has a absent HIV viral load.  In the ct of the abdomen the pt was found to have a non occuusive pr5ldtwyr int eh SVC, With her history this may represent a old clot but she was placed on Lovenox     The pt has no evidence for peripheral breast cancer and at this time the incidence of brain mets from breast cancer is very low. Considering her history of HIV it is very conceivable that she could have a lymphoma or a infectious process here and I would seriously consider stereotactic biopsy of the brain lesion her for definitive diagnosis. I would be wary of radiation without tissue.

## 2018-05-10 NOTE — PROGRESS NOTE ADULT - ASSESSMENT
A: 77y old  Female who presents with a chief complaint of fever (11 Apr 2018 14:25). Wounds  s/p debridement, will need continued dressing changes TID.    P:  - Please continue with TID Dakins moistened gauze dressing change to abdominal wound.  - Please continue with TID Saline WTD gauze on medial aspect of knee  - VAC change tomorrow to groin wound  - Continue care per primary team

## 2018-05-11 LAB
ANION GAP SERPL CALC-SCNC: 11 MMOL/L — SIGNIFICANT CHANGE UP (ref 5–17)
BUN SERPL-MCNC: 12 MG/DL — SIGNIFICANT CHANGE UP (ref 7–23)
CALCIUM SERPL-MCNC: 9.2 MG/DL — SIGNIFICANT CHANGE UP (ref 8.4–10.5)
CHLORIDE SERPL-SCNC: 99 MMOL/L — SIGNIFICANT CHANGE UP (ref 96–108)
CO2 SERPL-SCNC: 30 MMOL/L — SIGNIFICANT CHANGE UP (ref 22–31)
CREAT SERPL-MCNC: 0.6 MG/DL — SIGNIFICANT CHANGE UP (ref 0.5–1.3)
GLUCOSE SERPL-MCNC: 103 MG/DL — HIGH (ref 70–99)
HCT VFR BLD CALC: 26.8 % — LOW (ref 34.5–45)
HGB BLD-MCNC: 8.1 G/DL — LOW (ref 11.5–15.5)
INR BLD: 2.8 RATIO — HIGH (ref 0.88–1.16)
MCHC RBC-ENTMCNC: 26.9 PG — LOW (ref 27–34)
MCHC RBC-ENTMCNC: 30.2 GM/DL — LOW (ref 32–36)
MCV RBC AUTO: 89 FL — SIGNIFICANT CHANGE UP (ref 80–100)
PLATELET # BLD AUTO: 249 K/UL — SIGNIFICANT CHANGE UP (ref 150–400)
POTASSIUM SERPL-MCNC: 3.5 MMOL/L — SIGNIFICANT CHANGE UP (ref 3.5–5.3)
POTASSIUM SERPL-SCNC: 3.5 MMOL/L — SIGNIFICANT CHANGE UP (ref 3.5–5.3)
PROTHROM AB SERPL-ACNC: 32.3 SEC — HIGH (ref 10–13.1)
RBC # BLD: 3.01 M/UL — LOW (ref 3.8–5.2)
RBC # FLD: 17.1 % — HIGH (ref 10.3–14.5)
SODIUM SERPL-SCNC: 140 MMOL/L — SIGNIFICANT CHANGE UP (ref 135–145)
WBC # BLD: 10.56 K/UL — HIGH (ref 3.8–10.5)
WBC # FLD AUTO: 10.56 K/UL — HIGH (ref 3.8–10.5)

## 2018-05-11 PROCEDURE — 99232 SBSQ HOSP IP/OBS MODERATE 35: CPT

## 2018-05-11 RX ORDER — HYDROMORPHONE HYDROCHLORIDE 2 MG/ML
0.5 INJECTION INTRAMUSCULAR; INTRAVENOUS; SUBCUTANEOUS ONCE
Qty: 0 | Refills: 0 | Status: DISCONTINUED | OUTPATIENT
Start: 2018-05-11 | End: 2018-05-11

## 2018-05-11 RX ORDER — POVIDONE-IODINE 5 %
1 AEROSOL (ML) TOPICAL EVERY 12 HOURS
Qty: 0 | Refills: 0 | Status: DISCONTINUED | OUTPATIENT
Start: 2018-05-11 | End: 2018-06-06

## 2018-05-11 RX ORDER — WARFARIN SODIUM 2.5 MG/1
0.5 TABLET ORAL ONCE
Qty: 0 | Refills: 0 | Status: COMPLETED | OUTPATIENT
Start: 2018-05-11 | End: 2018-05-11

## 2018-05-11 RX ADMIN — HYDROMORPHONE HYDROCHLORIDE 2 MILLIGRAM(S): 2 INJECTION INTRAMUSCULAR; INTRAVENOUS; SUBCUTANEOUS at 08:13

## 2018-05-11 RX ADMIN — Medication 81 MILLIGRAM(S): at 13:34

## 2018-05-11 RX ADMIN — Medication 500000 UNIT(S): at 23:30

## 2018-05-11 RX ADMIN — Medication 1 TABLET(S): at 13:32

## 2018-05-11 RX ADMIN — MINOCYCLINE HYDROCHLORIDE 100 MILLIGRAM(S): 45 TABLET, EXTENDED RELEASE ORAL at 18:26

## 2018-05-11 RX ADMIN — NYSTATIN CREAM 1 APPLICATION(S): 100000 CREAM TOPICAL at 00:00

## 2018-05-11 RX ADMIN — Medication 500 MILLIGRAM(S): at 13:32

## 2018-05-11 RX ADMIN — HYDROMORPHONE HYDROCHLORIDE 2 MILLIGRAM(S): 2 INJECTION INTRAMUSCULAR; INTRAVENOUS; SUBCUTANEOUS at 03:12

## 2018-05-11 RX ADMIN — HYDROMORPHONE HYDROCHLORIDE 2 MILLIGRAM(S): 2 INJECTION INTRAMUSCULAR; INTRAVENOUS; SUBCUTANEOUS at 04:32

## 2018-05-11 RX ADMIN — Medication 1 APPLICATION(S): at 13:39

## 2018-05-11 RX ADMIN — Medication 3 MILLIGRAM(S): at 22:43

## 2018-05-11 RX ADMIN — WARFARIN SODIUM 0.5 MILLIGRAM(S): 2.5 TABLET ORAL at 22:42

## 2018-05-11 RX ADMIN — HYDROMORPHONE HYDROCHLORIDE 2 MILLIGRAM(S): 2 INJECTION INTRAMUSCULAR; INTRAVENOUS; SUBCUTANEOUS at 23:28

## 2018-05-11 RX ADMIN — Medication 500000 UNIT(S): at 13:33

## 2018-05-11 RX ADMIN — NYSTATIN CREAM 1 APPLICATION(S): 100000 CREAM TOPICAL at 13:41

## 2018-05-11 RX ADMIN — Medication 100 MILLIGRAM(S): at 22:44

## 2018-05-11 RX ADMIN — Medication 500000 UNIT(S): at 06:30

## 2018-05-11 RX ADMIN — HYDROMORPHONE HYDROCHLORIDE 0.5 MILLIGRAM(S): 2 INJECTION INTRAMUSCULAR; INTRAVENOUS; SUBCUTANEOUS at 11:15

## 2018-05-11 RX ADMIN — Medication 1 TABLET(S): at 22:45

## 2018-05-11 RX ADMIN — HYDROMORPHONE HYDROCHLORIDE 0.5 MILLIGRAM(S): 2 INJECTION INTRAMUSCULAR; INTRAVENOUS; SUBCUTANEOUS at 10:50

## 2018-05-11 RX ADMIN — Medication 500000 UNIT(S): at 18:25

## 2018-05-11 RX ADMIN — Medication 1 MILLIGRAM(S): at 13:33

## 2018-05-11 RX ADMIN — HYDROMORPHONE HYDROCHLORIDE 2 MILLIGRAM(S): 2 INJECTION INTRAMUSCULAR; INTRAVENOUS; SUBCUTANEOUS at 18:35

## 2018-05-11 RX ADMIN — Medication 100 MILLIGRAM(S): at 13:32

## 2018-05-11 RX ADMIN — NYSTATIN CREAM 1 APPLICATION(S): 100000 CREAM TOPICAL at 22:47

## 2018-05-11 RX ADMIN — HYDROMORPHONE HYDROCHLORIDE 2 MILLIGRAM(S): 2 INJECTION INTRAMUSCULAR; INTRAVENOUS; SUBCUTANEOUS at 08:11

## 2018-05-11 RX ADMIN — MINOCYCLINE HYDROCHLORIDE 100 MILLIGRAM(S): 45 TABLET, EXTENDED RELEASE ORAL at 06:42

## 2018-05-11 RX ADMIN — BUDESONIDE AND FORMOTEROL FUMARATE DIHYDRATE 2 PUFF(S): 160; 4.5 AEROSOL RESPIRATORY (INHALATION) at 06:29

## 2018-05-11 RX ADMIN — Medication 325 MILLIGRAM(S): at 13:32

## 2018-05-11 RX ADMIN — HYDROMORPHONE HYDROCHLORIDE 2 MILLIGRAM(S): 2 INJECTION INTRAMUSCULAR; INTRAVENOUS; SUBCUTANEOUS at 18:27

## 2018-05-11 RX ADMIN — TIOTROPIUM BROMIDE 1 CAPSULE(S): 18 CAPSULE ORAL; RESPIRATORY (INHALATION) at 13:32

## 2018-05-11 RX ADMIN — ZINC OXIDE 1 APPLICATION(S): 200 OINTMENT TOPICAL at 13:40

## 2018-05-11 RX ADMIN — BUDESONIDE AND FORMOTEROL FUMARATE DIHYDRATE 2 PUFF(S): 160; 4.5 AEROSOL RESPIRATORY (INHALATION) at 18:25

## 2018-05-11 NOTE — PROGRESS NOTE ADULT - SUBJECTIVE AND OBJECTIVE BOX
PLASTIC SURGERY DAILY PROGRESS NOTE:    Subjective:  Patient seen and examined. No acute events overnight.     Objective:    Vital Signs Last 24 Hrs  T(C): 36.7 (11 May 2018 06:28), Max: 36.9 (10 May 2018 21:34)  T(F): 98.1 (11 May 2018 06:28), Max: 98.5 (11 May 2018 00:48)  HR: 101 (11 May 2018 06:28) (81 - 101)  BP: 102/64 (11 May 2018 06:28) (101/61 - 110/68)  BP(mean): --  RR: 18 (11 May 2018 06:28) (17 - 20)  SpO2: 97% (11 May 2018 06:28) (93% - 97%)    I&O's Detail    10 May 2018 07:01  -  11 May 2018 07:00  --------------------------------------------------------  IN:    Oral Fluid: 895 mL  Total IN: 895 mL    OUT:    Indwelling Catheter - Urethral: 1 mL  Total OUT: 1 mL    Total NET: 894 mL    MEDICATIONS  (STANDING):  ascorbic acid 500 milliGRAM(s) Oral daily  aspirin enteric coated 81 milliGRAM(s) Oral daily  buDESOnide 160 MICROgram(s)/formoterol 4.5 MICROgram(s) Inhaler 2 Puff(s) Inhalation two times a day  calcium carbonate 1250 mG + Vitamin D (OsCal 500 + D) 1 Tablet(s) Oral three times a day  Dakins Solution - 1/4 Strength 1 Application(s) Topical three times a day  docusate sodium 100 milliGRAM(s) Oral three times a day  ferrous    sulfate 325 milliGRAM(s) Oral daily  folic acid 1 milliGRAM(s) Oral daily  lidocaine 1%/EPINEPHrine 1:100,000 Inj 10 milliLiter(s) Local Injection once  melatonin 3 milliGRAM(s) Oral at bedtime  minocycline 100 milliGRAM(s) Oral two times a day  multivitamin 1 Tablet(s) Oral daily  nystatin    Suspension 934133 Unit(s) Oral every 6 hours  nystatin Powder 1 Application(s) Topical two times a day  pantoprazole    Tablet 40 milliGRAM(s) Oral before breakfast  polyethylene glycol 3350 17 Gram(s) Oral daily  tiotropium 18 MICROgram(s) Capsule 1 Capsule(s) Inhalation daily  zinc oxide 20% Ointment 1 Application(s) Topical daily    MEDICATIONS  (PRN):  acetaminophen   Tablet 650 milliGRAM(s) Oral every 6 hours PRN For Temp greater than 38 C (100.4 F)  acetaminophen   Tablet. 650 milliGRAM(s) Oral every 6 hours PRN Mild Pain (1 - 3)  bisacodyl Suppository 10 milliGRAM(s) Rectal daily PRN Constipation  HYDROmorphone   Tablet 2 milliGRAM(s) Oral every 4 hours PRN Severe Pain (7 - 10)  lidocaine 2% Viscous 10 milliLiter(s) Swish and Spit every 6 hours PRN Mouth Sores  senna 2 Tablet(s) Oral at bedtime PRN Constipation    PHYSICAL EXAM:    General: Resting in bed  MSK: R upper thigh with VAC in place with proximal occlusion.  Knee with stable eschar with medial area open, wound base appears clean, no purulence, wet-to-dry dressing applied  Abdomen: Infra-Umbilical wound with tracking, worsening surrounding erythema, necrotic rim, getting Dakins wet-to-dry gauze. fibrinous exudate noted

## 2018-05-11 NOTE — PROGRESS NOTE ADULT - SUBJECTIVE AND OBJECTIVE BOX
Pt is a 77 year old female who was admitted several weeks ago with aspiration pneumonia and fever with right knee pain. She also had proximal thigh swelling and there was a hematoma of the leg as well. She has a history of PMR on steroids. She also has a history of tavr, AICD andf remote bilateral TKR and was found to have strep bacteremia and a ERIKA was negative and she also had a eschar of the right knee as well. She was placed on iv antibiotics and I understand that she will be completing the antibiotics, daptomycin on this date. She told me that she had anemia in the distant past and was placed on antibiotics. She is being seen by the plastics service as well. A ct of th femur was not remarkable for infection and a ct of the chest done several weeks ago showed bibasilar PNA. She was also noted to have a left renal lesion that is unchanged now since 12/2016.     The consult was requested to see if there is anything we can do for her anemia. The counts on the day of this admission was white cell count of 9.71 hemoglobin 8.4 hematocrit 26.4 MCV 86 and MCHC 31.8 and platelet count of [id number] the diff count was 74 polys 15 lymphs and 7 monos the bun was 9 and the creatinine was 0.6     5/4 the pt is stable and the iron studies are still pending at this time. The hemoglobin was 8.4. 5/7 the pt was seen and the notes over charu last few days. The pt will in the am have ferritin and iron studies sent off. 5/8 the ferritin came back as over 300 and therefore she is not iron deficient and the anemia is consistent with chronic disease. The use of epogen might be considered here if hemoglobin drops and can be implemented to reduce blood transfusions. 5/9 pt clearly looks better and repeat hemoglobin was stable at 8.1  PE weak appearing with female and needed help to do normal daily activities large eschar as described on the lower leg and at the time of my arrival, the wound vac was in place and she was being cared for by the staff.  5/8 the ferritin came back at over 300 and therefore she is not iron deficient and epo can be considered if the hemoglobin drops and is in need of blood support. 5/10 pt being evaluated for rehab and consider epo of hemoglobin drops below the 8 range. 5/11 notes reviewed and will order cbc on the weekend and decide on epo.  pmh as above in the body of the report  / no  contrib                        8.1    11.27 )-----------( 235      ( 09 May 2018 09:37 )             26.5   PE pt being cared for by the staff and she appeared to be stable.

## 2018-05-11 NOTE — PROGRESS NOTE ADULT - ASSESSMENT
Patient with infected right tkr s/p rudy, spacer for strept infection, had poor wound healing so returned 3 months after and had spacer exchange and complex wound closure with mrsa infection found. She is about 2 weeks into dapto and returns with fever, leukocytosis, ongoing pain and eschar of right knee wound and what appears to be a hematoma of the right thigh. INR has been up so that is good reason for the thigh findings. Must consider infected hematoma possible. UTI is possible. Pneumonia is possible given basilar rales. retroperitoneal hematoma possible .  PMR decompensation or adrenal insufficiency a consideration at well.    Pulmonary stable relative to prior admits --  wound care in progress  Completed IV ABX-5/5 -42 days  Debridement done-5/5  Vac placed 5/7-slow progress

## 2018-05-11 NOTE — PROGRESS NOTE ADULT - ATTENDING COMMENTS
as above-  Pulm relatively stable-atelectasis, prior PE, asthma, cardiac Dz  Inhaler regimen as outlined above  DVT rx /prophylaxis  ID follow up of ABX  completed 5/5--then minocycline  (debridement done 5/5)--following WBC   PT evaluation and Psych evaluation    ortho/plastics follow up-knee immobilizer/PT  Rehab pending-pulm. cleared for DC to rehab.    Everett Kumar MD-Pulmonary   [phone] as above-  Pulm relatively stable-atelectasis, prior PE, asthma, cardiac Dz  Inhaler regimen as outlined above  DVT rx /prophylaxis  ID follow up of ABX  completed 5/5--then minocycline  (debridement done 5/5)--following WBC   PT evaluation and Psych evaluation    ortho/plastics follow up-knee immobilizer/PT--advance ambulation as able  Rehab pending-pulm. cleared for DC to rehab.    Everett Kumar MD-Pulmonary   [phone]

## 2018-05-11 NOTE — PROGRESS NOTE ADULT - SUBJECTIVE AND OBJECTIVE BOX
Patient is a 77y old  Female who presents with a chief complaint of fever (11 Apr 2018 14:25)      INTERVAL HPI/OVERNIGHT EVENTS:noted  wound dressing by plastics at bedside      Vital Signs Last 24 Hrs  T(C): 36.3 (11 May 2018 12:28), Max: 36.9 (10 May 2018 21:34)  T(F): 97.3 (11 May 2018 12:28), Max: 98.5 (11 May 2018 00:48)  HR: 88 (11 May 2018 12:28) (88 - 101)  BP: 126/72 (11 May 2018 12:28) (102/60 - 126/72)  BP(mean): --  RR: 18 (11 May 2018 12:28) (17 - 20)  SpO2: 95% (11 May 2018 12:28) (95% - 97%)    acetaminophen   Tablet 650 milliGRAM(s) Oral every 6 hours PRN  acetaminophen   Tablet. 650 milliGRAM(s) Oral every 6 hours PRN  ascorbic acid 500 milliGRAM(s) Oral daily  aspirin enteric coated 81 milliGRAM(s) Oral daily  bisacodyl Suppository 10 milliGRAM(s) Rectal daily PRN  buDESOnide 160 MICROgram(s)/formoterol 4.5 MICROgram(s) Inhaler 2 Puff(s) Inhalation two times a day  calcium carbonate 1250 mG + Vitamin D (OsCal 500 + D) 1 Tablet(s) Oral three times a day  Dakins Solution - 1/4 Strength 1 Application(s) Topical three times a day  docusate sodium 100 milliGRAM(s) Oral three times a day  ferrous    sulfate 325 milliGRAM(s) Oral daily  folic acid 1 milliGRAM(s) Oral daily  HYDROmorphone   Tablet 2 milliGRAM(s) Oral every 4 hours PRN  lidocaine 1%/EPINEPHrine 1:100,000 Inj 10 milliLiter(s) Local Injection once  lidocaine 2% Viscous 10 milliLiter(s) Swish and Spit every 6 hours PRN  melatonin 3 milliGRAM(s) Oral at bedtime  minocycline 100 milliGRAM(s) Oral two times a day  multivitamin 1 Tablet(s) Oral daily  nystatin    Suspension 308541 Unit(s) Oral every 6 hours  nystatin Powder 1 Application(s) Topical two times a day  pantoprazole    Tablet 40 milliGRAM(s) Oral before breakfast  polyethylene glycol 3350 17 Gram(s) Oral daily  povidone iodine 10% Solution 1 Application(s) Topical every 12 hours  senna 2 Tablet(s) Oral at bedtime PRN  tiotropium 18 MICROgram(s) Capsule 1 Capsule(s) Inhalation daily  warfarin 0.5 milliGRAM(s) Oral once  zinc oxide 20% Ointment 1 Application(s) Topical daily      PHYSICAL EXAM:  GENERAL: NAD,   EYES: conjunctiva and sclera clear  ENMT: Moist mucous membranes  NECK: Supple, No JVD, Normal thyroid  NERVOUS SYSTEM:  Alert & Oriented X3,   CHEST/LUNG: Clear to auscultation bilaterally; No rales, rhonchi, wheezing, or rubs  HEART: Regular rate and rhythm; No murmurs, rubs, or gallops  ABDOMEN: Soft, Nontender, Nondistended; Bowel sounds present  EXTREMITIES:  2+ Peripheral Pulses, No clubbing, cyanosis, or edema  LYMPH: No lymphadenopathy noted  SKIN: No rashes or lesions    Consultant(s) Notes Reviewed:  [x ] YES  [ ] NO  Care Discussed with Consultants/Other Providers [ x] YES  [ ] NO    LABS:                        8.1    10.56 )-----------( 249      ( 11 May 2018 07:57 )             26.8     05-11    140  |  99  |  12  ----------------------------<  103<H>  3.5   |  30  |  0.60    Ca    9.2      11 May 2018 06:59      PT/INR - ( 11 May 2018 07:53 )   PT: 32.3 sec;   INR: 2.80 ratio             CAPILLARY BLOOD GLUCOSE                RADIOLOGY & ADDITIONAL TESTS:    Imaging Personally Reviewed:  [ ] YES  [ ] NO

## 2018-05-11 NOTE — PROGRESS NOTE ADULT - SUBJECTIVE AND OBJECTIVE BOX
pain controlled, no new events, afebrile    abdominal wound - dressed by plastics  RLE  thigh vac in place, changed today  surgical wound intact with stable eschar, some separation of eschar medially from skin, no exudate, dressed by prs/wound care  5/5 ta/ehl/gcs  silt l4-s1  2+ dp pulse

## 2018-05-11 NOTE — PROGRESS NOTE ADULT - ASSESSMENT
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 cultures NTD. fever resolved.     # Recent septic Rt TKR - abx spacer exchanged and plastics complex wound closure  # PMR on chronic steroids  # thigh wound,   # right knee eschar, right thigh necrosis and abd wound skin breakdown  #Anemia    Plan:  appreciate plastics discussion with family, cont local wound care as family declining surgery  sp multiple debridements, cont wet to dry dressing abd and knee wounds TID  wound vac for thigh wound  remains afebrile, cont suppressive minocycline, appreciate ID recs  appreciate psych eval  appreciate ortho recs  heme follow up  will cont coumadin and asa  pain control on oxy IR 10mg BID and dilaudid 2mg q4 prn  PT OOB encourage ambulation, dw patient importance of turning and trying to get out of bed  bowel regimen    plan of care dw patient

## 2018-05-11 NOTE — PROGRESS NOTE ADULT - SUBJECTIVE AND OBJECTIVE BOX
CHIEF COMPLAINT:    Interval Events:    REVIEW OF SYSTEMS:  Constitutional: No fevers or chills. No weight loss. No fatigue or generalized malaise.  Eyes: No itching or discharge from the eyes  ENT: No ear pain. No ear discharge. No nasal congestion. No post nasal drip. No epistaxis. No throat pain. No sore throat. No difficulty swallowing.   CV: No chest pain. No palpitations. No lightheadedness or dizziness.   Resp: No dyspnea at rest. No dyspnea on exertion. No orthopnea. No wheezing. No cough. No stridor. No sputum production. No chest pain with respiration.  GI: No nausea. No vomiting. No diarrhea.  MSK: No joint pain or pain in any extremities  Integumentary: No skin lesions. No pedal edema.  Neurological: No gross motor weakness. No sensory changes.  [ ] All other systems negative  [ ] Unable to assess ROS because ________    OBJECTIVE:  ICU Vital Signs Last 24 Hrs  T(C): 36.9 (11 May 2018 00:48), Max: 36.9 (10 May 2018 21:34)  T(F): 98.5 (11 May 2018 00:48), Max: 98.5 (11 May 2018 00:48)  HR: 99 (11 May 2018 00:48) (81 - 99)  BP: 110/68 (11 May 2018 00:48) (101/61 - 110/68)  BP(mean): --  ABP: --  ABP(mean): --  RR: 20 (11 May 2018 00:48) (17 - 20)  SpO2: 95% (11 May 2018 00:48) (93% - 96%)        05-09 @ 07:01  -  05-10 @ 07:00  --------------------------------------------------------  IN: 1020 mL / OUT: 625 mL / NET: 395 mL    05-10 @ 07:01 - 05-11 @ 04:52  --------------------------------------------------------  IN: 840 mL / OUT: 1 mL / NET: 839 mL      CAPILLARY BLOOD GLUCOSE          PHYSICAL EXAM:  General: Awake, alert, oriented X 3.   HEENT: Atraumatic, normocephalic.                 Mallampatti Grade                 No nasal congestion.                No tonsillar or pharyngeal exudates.  Lymph Nodes: No palpable lymphadenopathy  Neck: No JVD. No carotid bruit.   Respiratory: Normal chest expansion                         Normal percussion                         Normal and equal air entry                         No wheeze, rhonchi or rales.  Cardiovascular: S1 S2 normal. No murmurs, rubs or gallops.   Abdomen: Soft, non-tender, non-distended. No organomegaly.  Extremities: Warm to touch. Peripheral pulse palpable. No pedal edema.   Skin: No rashes or skin lesions  Neurological: Motor and sensory examination equal and normal in all four extremities.  Psychiatry: Appropriate mood and affect.    HOSPITAL MEDICATIONS:  MEDICATIONS  (STANDING):  ascorbic acid 500 milliGRAM(s) Oral daily  aspirin enteric coated 81 milliGRAM(s) Oral daily  buDESOnide 160 MICROgram(s)/formoterol 4.5 MICROgram(s) Inhaler 2 Puff(s) Inhalation two times a day  calcium carbonate 1250 mG + Vitamin D (OsCal 500 + D) 1 Tablet(s) Oral three times a day  Dakins Solution - 1/4 Strength 1 Application(s) Topical three times a day  docusate sodium 100 milliGRAM(s) Oral three times a day  ferrous    sulfate 325 milliGRAM(s) Oral daily  folic acid 1 milliGRAM(s) Oral daily  lidocaine 1%/EPINEPHrine 1:100,000 Inj 10 milliLiter(s) Local Injection once  melatonin 3 milliGRAM(s) Oral at bedtime  minocycline 100 milliGRAM(s) Oral two times a day  multivitamin 1 Tablet(s) Oral daily  nystatin    Suspension 075630 Unit(s) Oral every 6 hours  nystatin Powder 1 Application(s) Topical two times a day  pantoprazole    Tablet 40 milliGRAM(s) Oral before breakfast  polyethylene glycol 3350 17 Gram(s) Oral daily  tiotropium 18 MICROgram(s) Capsule 1 Capsule(s) Inhalation daily  zinc oxide 20% Ointment 1 Application(s) Topical daily    MEDICATIONS  (PRN):  acetaminophen   Tablet 650 milliGRAM(s) Oral every 6 hours PRN For Temp greater than 38 C (100.4 F)  acetaminophen   Tablet. 650 milliGRAM(s) Oral every 6 hours PRN Mild Pain (1 - 3)  bisacodyl Suppository 10 milliGRAM(s) Rectal daily PRN Constipation  HYDROmorphone   Tablet 2 milliGRAM(s) Oral every 4 hours PRN Severe Pain (7 - 10)  lidocaine 2% Viscous 10 milliLiter(s) Swish and Spit every 6 hours PRN Mouth Sores  senna 2 Tablet(s) Oral at bedtime PRN Constipation      LABS:                        8.1    11.27 )-----------( 235      ( 09 May 2018 09:37 )             26.5     05-09    140  |  98  |  17  ----------------------------<  95  3.6   |  29  |  0.72    Ca    9.5      09 May 2018 07:19      PT/INR - ( 10 May 2018 08:04 )   PT: 34.9 sec;   INR: 3.02 ratio                   MICROBIOLOGY:     RADIOLOGY:  [ ] Reviewed and interpreted by me    Point of Care Ultrasound Findings:    PFT:    EKG: CHIEF COMPLAINT: better spirits--less pain, no cough or sob    Interval Events: plastics/ortho/ID    REVIEW OF SYSTEMS:  Constitutional: No fevers or chills. No weight loss. + fatigue or generalized malaise.  Eyes: No itching or discharge from the eyes  ENT: No ear pain. No ear discharge. No nasal congestion. No post nasal drip. No epistaxis. No throat pain. No sore throat. No difficulty swallowing.   CV: No chest pain. No palpitations. No lightheadedness or dizziness.   Resp: No dyspnea at rest. + dyspnea on exertion. No orthopnea. No wheezing. No cough. No stridor. No sputum production. No chest pain with respiration.  GI: No nausea. No vomiting. No diarrhea.  MSK: No joint pain or pain in any extremities-except RLE  Integumentary: No skin lesions. No pedal edema.  Neurological: No gross motor weakness. No sensory changes.  [+ ] All other systems negative  [ ] Unable to assess ROS because ________    OBJECTIVE:  ICU Vital Signs Last 24 Hrs  T(C): 36.9 (11 May 2018 00:48), Max: 36.9 (10 May 2018 21:34)  T(F): 98.5 (11 May 2018 00:48), Max: 98.5 (11 May 2018 00:48)  HR: 99 (11 May 2018 00:48) (81 - 99)  BP: 110/68 (11 May 2018 00:48) (101/61 - 110/68)  BP(mean): --  ABP: --  ABP(mean): --  RR: 20 (11 May 2018 00:48) (17 - 20)  SpO2: 95% (11 May 2018 00:48) (93% - 96%)        05-09 @ 07:01  -  05-10 @ 07:00  --------------------------------------------------------  IN: 1020 mL / OUT: 625 mL / NET: 395 mL    05-10 @ 07:01  -  05-11 @ 04:52  --------------------------------------------------------  IN: 840 mL / OUT: 1 mL / NET: 839 mL      CAPILLARY BLOOD GLUCOSE          PHYSICAL EXAM: NAD in bed on NC O2  General: Awake, alert, oriented X 3.   HEENT: Atraumatic, normocephalic.                 Mallampatti Grade 3                No nasal congestion.                No tonsillar or pharyngeal exudates.  Lymph Nodes: No palpable lymphadenopathy  Neck: No JVD. No carotid bruit.   Respiratory: Normal chest expansion-reduced BS bases                         Normal percussion                         Normal and equal air entry                         No wheeze, rhonchi or rales.  Cardiovascular: S1 S2 normal. No murmurs, rubs or gallops.   Abdomen: Soft, non-tender, non-distended. No organomegaly.  Extremities: Warm to touch. Peripheral pulse palpable. No pedal edema.   Skin: No rashes or skin lesions--RLE wound VAC  Neurological: Motor and sensory examination equal and normal in all four extremities.  Psychiatry: Appropriate mood and affect.    HOSPITAL MEDICATIONS:  MEDICATIONS  (STANDING):  ascorbic acid 500 milliGRAM(s) Oral daily  aspirin enteric coated 81 milliGRAM(s) Oral daily  buDESOnide 160 MICROgram(s)/formoterol 4.5 MICROgram(s) Inhaler 2 Puff(s) Inhalation two times a day  calcium carbonate 1250 mG + Vitamin D (OsCal 500 + D) 1 Tablet(s) Oral three times a day  Dakins Solution - 1/4 Strength 1 Application(s) Topical three times a day  docusate sodium 100 milliGRAM(s) Oral three times a day  ferrous    sulfate 325 milliGRAM(s) Oral daily  folic acid 1 milliGRAM(s) Oral daily  lidocaine 1%/EPINEPHrine 1:100,000 Inj 10 milliLiter(s) Local Injection once  melatonin 3 milliGRAM(s) Oral at bedtime  minocycline 100 milliGRAM(s) Oral two times a day  multivitamin 1 Tablet(s) Oral daily  nystatin    Suspension 599529 Unit(s) Oral every 6 hours  nystatin Powder 1 Application(s) Topical two times a day  pantoprazole    Tablet 40 milliGRAM(s) Oral before breakfast  polyethylene glycol 3350 17 Gram(s) Oral daily  tiotropium 18 MICROgram(s) Capsule 1 Capsule(s) Inhalation daily  zinc oxide 20% Ointment 1 Application(s) Topical daily    MEDICATIONS  (PRN):  acetaminophen   Tablet 650 milliGRAM(s) Oral every 6 hours PRN For Temp greater than 38 C (100.4 F)  acetaminophen   Tablet. 650 milliGRAM(s) Oral every 6 hours PRN Mild Pain (1 - 3)  bisacodyl Suppository 10 milliGRAM(s) Rectal daily PRN Constipation  HYDROmorphone   Tablet 2 milliGRAM(s) Oral every 4 hours PRN Severe Pain (7 - 10)  lidocaine 2% Viscous 10 milliLiter(s) Swish and Spit every 6 hours PRN Mouth Sores  senna 2 Tablet(s) Oral at bedtime PRN Constipation      LABS:                        8.1    11.27 )-----------( 235      ( 09 May 2018 09:37 )             26.5     05-09    140  |  98  |  17  ----------------------------<  95  3.6   |  29  |  0.72    Ca    9.5      09 May 2018 07:19      PT/INR - ( 10 May 2018 08:04 )   PT: 34.9 sec;   INR: 3.02 ratio                   MICROBIOLOGY:     RADIOLOGY:  [ ] Reviewed and interpreted by me    Point of Care Ultrasound Findings:    PFT:    EKG:

## 2018-05-11 NOTE — PROGRESS NOTE ADULT - ASSESSMENT
no plan for orthopedic surgical intervention per family and patient preference  local wound care per plastics and wound care team to abdomen and knee, VAC placed on thigh wound, pending vac to abdomen  PO abx per ID team  encourage patient to spend majority of bed oob in bedside chair as able, has not been OOB in some time  PT to help mobilize, she must remain 50% wb on the RLE and NO KNEE MOTION allowed, knee immobilizer if oob  coumadin, inr goal 2-3  continue to optimize nutrition  continue to involve psych, remains emotional at times	  dispo planning when deemed optimized per prs/wound care    Nate Bass MD

## 2018-05-11 NOTE — CHART NOTE - NSCHARTNOTEFT_GEN_A_CORE
Verbal consult by NP for patient family request. Met with patient, daughter, and private HHA in room. Pt requests more variety of menus. Discussed protein supplements, patient's daughter presnet and explained about  Howard benefitrs with wound healing. Food preferences noted, pt only like Nepro berry flavor. Plan to ugrade diet to Low sodium  Source: Patient [ x]    Family [X ]     other [ ]    Diet : Soft      Patient reports:  menu dissatisfaction, limited choices, [id number] liberalizing menu      PO intake:  < 50% [ ] 50-75% [ X]   % [ ]  other :     Source for PO intake [X ] Patient [X ] family [ ] chart [X ] staff [ ] other     Enteral /Parenteral Nutrition: NA      Current Weight:   % Weight Change    Pertinent Medications: MEDICATIONS  (STANDING):  ascorbic acid 500 milliGRAM(s) Oral daily  aspirin enteric coated 81 milliGRAM(s) Oral daily  buDESOnide 160 MICROgram(s)/formoterol 4.5 MICROgram(s) Inhaler 2 Puff(s) Inhalation two times a day  calcium carbonate 1250 mG + Vitamin D (OsCal 500 + D) 1 Tablet(s) Oral three times a day  Dakins Solution - 1/4 Strength 1 Application(s) Topical three times a day  docusate sodium 100 milliGRAM(s) Oral three times a day  ferrous    sulfate 325 milliGRAM(s) Oral daily  folic acid 1 milliGRAM(s) Oral daily  lidocaine 1%/EPINEPHrine 1:100,000 Inj 10 milliLiter(s) Local Injection once  melatonin 3 milliGRAM(s) Oral at bedtime  minocycline 100 milliGRAM(s) Oral two times a day  multivitamin 1 Tablet(s) Oral daily  nystatin    Suspension 784168 Unit(s) Oral every 6 hours  nystatin Powder 1 Application(s) Topical two times a day  pantoprazole    Tablet 40 milliGRAM(s) Oral before breakfast  polyethylene glycol 3350 17 Gram(s) Oral daily  povidone iodine 10% Solution 1 Application(s) Topical every 12 hours  tiotropium 18 MICROgram(s) Capsule 1 Capsule(s) Inhalation daily  warfarin 0.5 milliGRAM(s) Oral once  zinc oxide 20% Ointment 1 Application(s) Topical daily    MEDICATIONS  (PRN):  acetaminophen   Tablet 650 milliGRAM(s) Oral every 6 hours PRN For Temp greater than 38 C (100.4 F)  acetaminophen   Tablet. 650 milliGRAM(s) Oral every 6 hours PRN Mild Pain (1 - 3)  bisacodyl Suppository 10 milliGRAM(s) Rectal daily PRN Constipation  HYDROmorphone   Tablet 2 milliGRAM(s) Oral every 4 hours PRN Severe Pain (7 - 10)  lidocaine 2% Viscous 10 milliLiter(s) Swish and Spit every 6 hours PRN Mouth Sores  senna 2 Tablet(s) Oral at bedtime PRN Constipation    Pertinent Labs:  05-11 Na140 mmol/L Glu 103 mg/dL<H> K+ 3.5 mmol/L Cr  0.60 mg/dL BUN 12 mg/dL      Skin:     Estimated Needs:   [ ] no change since previous assessment  [ ] recalculated:       Previous Nutrition Diagnosis:     [ ] Inadequate Energy Intake [ ]Inadequate Oral Intake [ ] Excessive Energy Intake     [ ] Underweight [ ] Increased Nutrient Needs [ ] Overweight/Obesity     [ ] Altered GI Function [ ] Unintended Weight Loss [ ] Food & Nutrition Related Knowledge Deficit [ ] Malnutrition          Nutrition Diagnosis is [ ] ongoing  [ ] resolved [ ] not applicable          New Nutrition Diagnosis: [ ] not applicable    [ ] Inadequate Protein Energy Intake [ ]Inadequate Oral Intake [ ] Excessive Energy Intake     [ ] Underweight [ ] Increased Nutrient Needs [ ] Overweight/Obesity     [ ] Altered GI Function [ ] Unintended Weight Loss [ ] Food & Nutrition Related Knowledge Deficit[ ] Limited Adherence to nutrition related recommendations [ ] Malnutrition  [ ] other: Free text       Related to:      As evidenced by:      Interventions:     Recommend    [ ] Change Diet To:    [ ] Nutrition Supplement    [ ] Nutrition Support    [ ] Other:        Monitoring and Evaluation:     [ ] PO intake [ ] Tolerance to diet prescription [ ] weights [ ] follow up per protocol    [ ] other: Verbal consult by NP for patient family request. Met with patient, daughter, and private HHA in room. Pt requests more variety of menus. Discussed protein supplements, patient's daughter presnet and explained about  Howard benefitrs with wound healing. Food preferences noted, pt only like Nepro berry flavor. Plan to ugrade diet to Low sodium  Source: Patient [ x]    Family [X ]     other [ ]    Diet : Soft      Patient reports:  menu dissatisfaction, limited choices, [id number] liberalizing menu      PO intake:  < 50% [ ] 50-75% [ X]   % [ ]  other :     Source for PO intake [X ] Patient [X ] family [ ] chart [X ] staff [ ] other     Enteral /Parenteral Nutrition: NA      Current Weight:   % Weight Change    Pertinent Medications: MEDICATIONS  (STANDING):  ascorbic acid 500 milliGRAM(s) Oral daily  aspirin enteric coated 81 milliGRAM(s) Oral daily  buDESOnide 160 MICROgram(s)/formoterol 4.5 MICROgram(s) Inhaler 2 Puff(s) Inhalation two times a day  calcium carbonate 1250 mG + Vitamin D (OsCal 500 + D) 1 Tablet(s) Oral three times a day  Dakins Solution - 1/4 Strength 1 Application(s) Topical three times a day  docusate sodium 100 milliGRAM(s) Oral three times a day  ferrous    sulfate 325 milliGRAM(s) Oral daily  folic acid 1 milliGRAM(s) Oral daily  lidocaine 1%/EPINEPHrine 1:100,000 Inj 10 milliLiter(s) Local Injection once  melatonin 3 milliGRAM(s) Oral at bedtime  minocycline 100 milliGRAM(s) Oral two times a day  multivitamin 1 Tablet(s) Oral daily  nystatin    Suspension 794097 Unit(s) Oral every 6 hours  nystatin Powder 1 Application(s) Topical two times a day  pantoprazole    Tablet 40 milliGRAM(s) Oral before breakfast  polyethylene glycol 3350 17 Gram(s) Oral daily  povidone iodine 10% Solution 1 Application(s) Topical every 12 hours  tiotropium 18 MICROgram(s) Capsule 1 Capsule(s) Inhalation daily  warfarin 0.5 milliGRAM(s) Oral once  zinc oxide 20% Ointment 1 Application(s) Topical daily    MEDICATIONS  (PRN):  acetaminophen   Tablet 650 milliGRAM(s) Oral every 6 hours PRN For Temp greater than 38 C (100.4 F)  acetaminophen   Tablet. 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, family and patient refuses AKA as recommended by ortho; S/p abdominal wound debridement        Estimated Needs:   [X ] no change since previous assessment  [ ] recalculated:       Previous Nutrition Diagnosis:          [X ] Increased Nutrient Needs            Nutrition Diagnosis is [X ] ongoing  [ ] resolved [ ] not applicable       Recommend    [ ] Change Diet To:    [ X] Nutrition Supplement  Nepro 2x/day berry flavor, howard 2x/day, prosource 2x/day  [ ] Nutrition Support    [ ] Other:        Monitoring and Evaluation:     [X] PO intake [X ] Tolerance to diet prescription [x ] weights [x ] follow up per protocol    [ ] other: Verbal consult by NP for patient family request. Met with patient, daughter, and private HHA in room. Pt requests more variety of menus. Discussed protein supplements, patient's daughter presnet and explained about  Howard benefitrs with wound healing. Food preferences noted, pt only like Nepro berry flavor. Plan to ugrade diet to Low sodium.      Source: Patient [ x]    Family [X ]     other [ ] patient c/o pain during visit, RN aware    Diet : Soft      Patient reports:  menu dissatisfaction, limited choices, [id number] liberalizing menu      PO intake:  < 50% [ ] 50-75% [ X]   % [ ]  other :     Source for PO intake [X ] Patient [X ] family [ ] chart [X ] staff [ ] other     Enteral /Parenteral Nutrition: NA       current weight  108kg   Weight Change  loss 10kg  Patient remainsI>30BMI, obese        Pertinent Medications: MEDICATIONS  (STANDING):  ascorbic acid 500 milliGRAM(s) Oral daily  aspirin enteric coated 81 milliGRAM(s) Oral daily  buDESOnide 160 MICROgram(s)/formoterol 4.5 MICROgram(s) Inhaler 2 Puff(s) Inhalation two times a day  calcium carbonate 1250 mG + Vitamin D (OsCal 500 + D) 1 Tablet(s) Oral three times a day  Dakins Solution - 1/4 Strength 1 Application(s) Topical three times a day  docusate sodium 100 milliGRAM(s) Oral three times a day  ferrous    sulfate 325 milliGRAM(s) Oral daily  folic acid 1 milliGRAM(s) Oral daily  lidocaine 1%/EPINEPHrine 1:100,000 Inj 10 milliLiter(s) Local Injection once  melatonin 3 milliGRAM(s) Oral at bedtime  minocycline 100 milliGRAM(s) Oral two times a day  multivitamin 1 Tablet(s) Oral daily  nystatin    Suspension 418535 Unit(s) Oral every 6 hours  nystatin Powder 1 Application(s) Topical two times a day  pantoprazole    Tablet 40 milliGRAM(s) Oral before breakfast  polyethylene glycol 3350 17 Gram(s) Oral daily  povidone iodine 10% Solution 1 Application(s) Topical every 12 hours  tiotropium 18 MICROgram(s) Capsule 1 Capsule(s) Inhalation daily  warfarin 0.5 milliGRAM(s) Oral once  zinc oxide 20% Ointment 1 Application(s) Topical daily    MEDICATIONS  (PRN):  acetaminophen   Tablet 650 milliGRAM(s) Oral every 6 hours PRN For Temp greater than 38 C (100.4 F)  acetaminophen   Tablet. 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, family and patient refuses AKA as recommended by ortho; S/p abdominal wound debridement        Estimated Needs:   [X ] no change since previous assessment  [ ] recalculated:       Previous Nutrition Diagnosis:          [X ] Increased Nutrient Needs            Nutrition Diagnosis is [X ] ongoing  [ ] resolved [ ] not applicable       Recommend    [ ] Change Diet To:    [ X] Nutrition Supplement  Nepro 2x/day berry flavor, howard 2x/day, prosource 2x/day  [ ] Nutrition Support    [ ] Other:        Monitoring and Evaluation:     [X] PO intake [X ] Tolerance to diet prescription [x ] weights [x ] follow up per protocol    [ ] other:

## 2018-05-12 LAB
HCT VFR BLD CALC: 27.9 % — LOW (ref 34.5–45)
HGB BLD-MCNC: 8.4 G/DL — LOW (ref 11.5–15.5)
INR BLD: 2.56 RATIO — HIGH (ref 0.88–1.16)
MCHC RBC-ENTMCNC: 26.8 PG — LOW (ref 27–34)
MCHC RBC-ENTMCNC: 30.1 GM/DL — LOW (ref 32–36)
MCV RBC AUTO: 88.9 FL — SIGNIFICANT CHANGE UP (ref 80–100)
PLATELET # BLD AUTO: 302 K/UL — SIGNIFICANT CHANGE UP (ref 150–400)
PROTHROM AB SERPL-ACNC: 29.5 SEC — HIGH (ref 10–13.1)
RBC # BLD: 3.14 M/UL — LOW (ref 3.8–5.2)
RBC # FLD: 17.2 % — HIGH (ref 10.3–14.5)
WBC # BLD: 12.07 K/UL — HIGH (ref 3.8–10.5)
WBC # FLD AUTO: 12.07 K/UL — HIGH (ref 3.8–10.5)

## 2018-05-12 PROCEDURE — 99233 SBSQ HOSP IP/OBS HIGH 50: CPT | Mod: GC

## 2018-05-12 RX ORDER — HYDROMORPHONE HYDROCHLORIDE 2 MG/ML
0.5 INJECTION INTRAMUSCULAR; INTRAVENOUS; SUBCUTANEOUS DAILY
Qty: 0 | Refills: 0 | Status: DISCONTINUED | OUTPATIENT
Start: 2018-05-12 | End: 2018-05-18

## 2018-05-12 RX ORDER — WARFARIN SODIUM 2.5 MG/1
0.5 TABLET ORAL ONCE
Qty: 0 | Refills: 0 | Status: COMPLETED | OUTPATIENT
Start: 2018-05-12 | End: 2018-05-12

## 2018-05-12 RX ADMIN — HYDROMORPHONE HYDROCHLORIDE 2 MILLIGRAM(S): 2 INJECTION INTRAMUSCULAR; INTRAVENOUS; SUBCUTANEOUS at 00:30

## 2018-05-12 RX ADMIN — Medication 1 MILLIGRAM(S): at 12:18

## 2018-05-12 RX ADMIN — Medication 1 TABLET(S): at 22:16

## 2018-05-12 RX ADMIN — Medication 325 MILLIGRAM(S): at 12:17

## 2018-05-12 RX ADMIN — Medication 650 MILLIGRAM(S): at 13:46

## 2018-05-12 RX ADMIN — HYDROMORPHONE HYDROCHLORIDE 0.5 MILLIGRAM(S): 2 INJECTION INTRAMUSCULAR; INTRAVENOUS; SUBCUTANEOUS at 22:29

## 2018-05-12 RX ADMIN — TIOTROPIUM BROMIDE 1 CAPSULE(S): 18 CAPSULE ORAL; RESPIRATORY (INHALATION) at 12:18

## 2018-05-12 RX ADMIN — Medication 650 MILLIGRAM(S): at 07:53

## 2018-05-12 RX ADMIN — ZINC OXIDE 1 APPLICATION(S): 200 OINTMENT TOPICAL at 12:19

## 2018-05-12 RX ADMIN — WARFARIN SODIUM 0.5 MILLIGRAM(S): 2.5 TABLET ORAL at 22:15

## 2018-05-12 RX ADMIN — Medication 1 APPLICATION(S): at 13:44

## 2018-05-12 RX ADMIN — Medication 81 MILLIGRAM(S): at 12:18

## 2018-05-12 RX ADMIN — Medication 650 MILLIGRAM(S): at 08:30

## 2018-05-12 RX ADMIN — MINOCYCLINE HYDROCHLORIDE 100 MILLIGRAM(S): 45 TABLET, EXTENDED RELEASE ORAL at 06:11

## 2018-05-12 RX ADMIN — HYDROMORPHONE HYDROCHLORIDE 2 MILLIGRAM(S): 2 INJECTION INTRAMUSCULAR; INTRAVENOUS; SUBCUTANEOUS at 05:59

## 2018-05-12 RX ADMIN — BUDESONIDE AND FORMOTEROL FUMARATE DIHYDRATE 2 PUFF(S): 160; 4.5 AEROSOL RESPIRATORY (INHALATION) at 18:02

## 2018-05-12 RX ADMIN — Medication 1 APPLICATION(S): at 17:58

## 2018-05-12 RX ADMIN — MINOCYCLINE HYDROCHLORIDE 100 MILLIGRAM(S): 45 TABLET, EXTENDED RELEASE ORAL at 17:57

## 2018-05-12 RX ADMIN — HYDROMORPHONE HYDROCHLORIDE 2 MILLIGRAM(S): 2 INJECTION INTRAMUSCULAR; INTRAVENOUS; SUBCUTANEOUS at 12:12

## 2018-05-12 RX ADMIN — HYDROMORPHONE HYDROCHLORIDE 0.5 MILLIGRAM(S): 2 INJECTION INTRAMUSCULAR; INTRAVENOUS; SUBCUTANEOUS at 22:59

## 2018-05-12 RX ADMIN — Medication 1 TABLET(S): at 12:18

## 2018-05-12 RX ADMIN — Medication 100 MILLIGRAM(S): at 22:15

## 2018-05-12 RX ADMIN — Medication 500000 UNIT(S): at 12:17

## 2018-05-12 RX ADMIN — PANTOPRAZOLE SODIUM 40 MILLIGRAM(S): 20 TABLET, DELAYED RELEASE ORAL at 06:11

## 2018-05-12 RX ADMIN — Medication 500000 UNIT(S): at 06:15

## 2018-05-12 RX ADMIN — Medication 500 MILLIGRAM(S): at 12:18

## 2018-05-12 RX ADMIN — Medication 650 MILLIGRAM(S): at 14:33

## 2018-05-12 RX ADMIN — Medication 1 APPLICATION(S): at 22:32

## 2018-05-12 RX ADMIN — BUDESONIDE AND FORMOTEROL FUMARATE DIHYDRATE 2 PUFF(S): 160; 4.5 AEROSOL RESPIRATORY (INHALATION) at 06:15

## 2018-05-12 RX ADMIN — NYSTATIN CREAM 1 APPLICATION(S): 100000 CREAM TOPICAL at 12:19

## 2018-05-12 RX ADMIN — Medication 500000 UNIT(S): at 17:58

## 2018-05-12 RX ADMIN — Medication 100 MILLIGRAM(S): at 06:11

## 2018-05-12 RX ADMIN — Medication 3 MILLIGRAM(S): at 22:16

## 2018-05-12 RX ADMIN — HYDROMORPHONE HYDROCHLORIDE 2 MILLIGRAM(S): 2 INJECTION INTRAMUSCULAR; INTRAVENOUS; SUBCUTANEOUS at 13:00

## 2018-05-12 RX ADMIN — Medication 1 TABLET(S): at 06:11

## 2018-05-12 NOTE — PROGRESS NOTE ADULT - SUBJECTIVE AND OBJECTIVE BOX
CHIEF COMPLAINT: better spirits--less pain, no cough or sob     Interval Events: plastics/ortho/ID    REVIEW OF SYSTEMS:  Constitutional: No fevers or chills. No weight loss. + fatigue or generalized malaise.  Eyes: No itching or discharge from the eyes  ENT: No ear pain. No ear discharge. No nasal congestion. No post nasal drip. No epistaxis. No throat pain. No sore throat. No difficulty swallowing.   CV: No chest pain. No palpitations. No lightheadedness or dizziness.   Resp: No dyspnea at rest. + dyspnea on exertion. No orthopnea. No wheezing. No cough. No stridor. No sputum production. No chest pain with respiration.  GI: No nausea. No vomiting. No diarrhea.  MSK: No joint pain or pain in any extremities-except RLE  Integumentary: No skin lesions. No pedal edema.  Neurological: No gross motor weakness. No sensory changes.  [+ ] All other systems negative  [ ] Unable to assess ROS because ________    OBJECTIVE:  ICU Vital Signs Last 24 Hrs  T(C): 36.9 (11 May 2018 00:48), Max: 36.9 (10 May 2018 21:34)  T(F): 98.5 (11 May 2018 00:48), Max: 98.5 (11 May 2018 00:48)  HR: 99 (11 May 2018 00:48) (81 - 99)  BP: 110/68 (11 May 2018 00:48) (101/61 - 110/68)  BP(mean): --  ABP: --  ABP(mean): --  RR: 20 (11 May 2018 00:48) (17 - 20)  SpO2: 95% (11 May 2018 00:48) (93% - 96%)        05-09 @ 07:01  -  05-10 @ 07:00  --------------------------------------------------------  IN: 1020 mL / OUT: 625 mL / NET: 395 mL    05-10 @ 07:01  -  05-11 @ 04:52  --------------------------------------------------------  IN: 840 mL / OUT: 1 mL / NET: 839 mL      CAPILLARY BLOOD GLUCOSE          PHYSICAL EXAM: NAD in bed on NC O2  General: Awake, alert, oriented X 3.   HEENT: Atraumatic, normocephalic.                 Mallampatti Grade 3                No nasal congestion.                No tonsillar or pharyngeal exudates.  Lymph Nodes: No palpable lymphadenopathy  Neck: No JVD. No carotid bruit.   Respiratory: Normal chest expansion-reduced BS bases                         Normal percussion                         Normal and equal air entry                         No wheeze, rhonchi or rales.  Cardiovascular: S1 S2 normal. No murmurs, rubs or gallops.   Abdomen: Soft, non-tender, non-distended. No organomegaly.  Extremities: Warm to touch. Peripheral pulse palpable. No pedal edema.   Skin: No rashes or skin lesions--RLE wound VAC  Neurological: Motor and sensory examination equal and normal in all four extremities.  Psychiatry: Appropriate mood and affect.    HOSPITAL MEDICATIONS:  MEDICATIONS  (STANDING):  ascorbic acid 500 milliGRAM(s) Oral daily  aspirin enteric coated 81 milliGRAM(s) Oral daily  buDESOnide 160 MICROgram(s)/formoterol 4.5 MICROgram(s) Inhaler 2 Puff(s) Inhalation two times a day  calcium carbonate 1250 mG + Vitamin D (OsCal 500 + D) 1 Tablet(s) Oral three times a day  Dakins Solution - 1/4 Strength 1 Application(s) Topical three times a day  docusate sodium 100 milliGRAM(s) Oral three times a day  ferrous    sulfate 325 milliGRAM(s) Oral daily  folic acid 1 milliGRAM(s) Oral daily  lidocaine 1%/EPINEPHrine 1:100,000 Inj 10 milliLiter(s) Local Injection once  melatonin 3 milliGRAM(s) Oral at bedtime  minocycline 100 milliGRAM(s) Oral two times a day  multivitamin 1 Tablet(s) Oral daily  nystatin    Suspension 669068 Unit(s) Oral every 6 hours  nystatin Powder 1 Application(s) Topical two times a day  pantoprazole    Tablet 40 milliGRAM(s) Oral before breakfast  polyethylene glycol 3350 17 Gram(s) Oral daily  tiotropium 18 MICROgram(s) Capsule 1 Capsule(s) Inhalation daily  zinc oxide 20% Ointment 1 Application(s) Topical daily    MEDICATIONS  (PRN):  acetaminophen   Tablet 650 milliGRAM(s) Oral every 6 hours PRN For Temp greater than 38 C (100.4 F)  acetaminophen   Tablet. 650 milliGRAM(s) Oral every 6 hours PRN Mild Pain (1 - 3)  bisacodyl Suppository 10 milliGRAM(s) Rectal daily PRN Constipation  HYDROmorphone   Tablet 2 milliGRAM(s) Oral every 4 hours PRN Severe Pain (7 - 10)  lidocaine 2% Viscous 10 milliLiter(s) Swish and Spit every 6 hours PRN Mouth Sores  senna 2 Tablet(s) Oral at bedtime PRN Constipation    < from: CT Chest No Cont (04.09.18 @ 22:31) >  IMPRESSION:     Multifocal pneumonia.    No acute intra-abdominal pathology.    No evidence of retroperitoneal hematoma. Small subcutaneous hematoma   within the left lower anterior abdominal wall.    < end of copied text >    LABS:                        8.1    11.27 )-----------( 235      ( 09 May 2018 09:37 )             26.5     05-09    140  |  98  |  17  ----------------------------<  95  3.6   |  29  |  0.72    Ca    9.5      09 May 2018 07:19      PT/INR - ( 10 May 2018 08:04 )   PT: 34.9 sec;   INR: 3.02 ratio                   MICROBIOLOGY:     RADIOLOGY:  [ ] Reviewed and interpreted by me    Point of Care Ultrasound Findings:    PFT:    EKG:

## 2018-05-12 NOTE — PROGRESS NOTE ADULT - SUBJECTIVE AND OBJECTIVE BOX
CC: f/u for mrsa knee infection    Patient reports no complaints     REVIEW OF SYSTEMS:  All other review of systems negative (Comprehensive ROS)      Other Medications Reviewed    Vital Signs Last 24 Hrs  T(C): 36.4 (12 May 2018 11:55), Max: 37.2 (11 May 2018 20:21)  T(F): 97.6 (12 May 2018 11:55), Max: 99 (11 May 2018 20:21)  HR: 104 (12 May 2018 11:55) (80 - 104)  BP: 134/76 (12 May 2018 11:55) (107/74 - 134/76)  BP(mean): --  RR: 18 (12 May 2018 11:55) (17 - 20)  SpO2: 95% (12 May 2018 11:55) (95% - 97%)    PHYSICAL EXAM:  General: alert, no acute distress  Eyes:  anicteric, no conjunctival injection, no discharge  Oropharynx: no lesions or injection 	  Neck: supple, without adenopathy  Lungs: clear to auscultation  Heart: regular rate and rhythm; no murmur, rubs or gallops  Abdomen: soft, nondistended, nontender, without mass or organomegaly. wound packed  Skin: no lesions  Extremities: right thigh wound debrided and packed, right knee eschar  Neurologic: alert, oriented, moves all extremities    LAB RESULTS:                        8.4    12.07 )-----------( 302      ( 12 May 2018 09:05 )             27.9                                        8.1    11.97 )-----------( 276      ( 07 May 2018 09:29 )             26.4                           8.5    12.68 )-----------( 305      ( 06 May 2018 10:21 )             27.4     Culture - Other (04.27.18 @ 18:18)    Specimen Source: Skin abdominal wound    Culture Results:   No growth at 48 hours      RADIOLOGY REVIEWED:    < from: CT Femur No Cont, Right (04.19.18 @ 19:06) >  IMPRESSION:  1.  Patient status post explantation of right total knee arthroplasty and   washout. Antibiotic cement and intramedullary ruthann are identified in the   right knee joint. No evidence of acute hardware complication.  2.  No focal, drainable fluid collections are identified on today's   examination.  3.  Nonspecific subcutaneous edema involving the patient's pannus and   subcutaneous fat of the medial thigh.      < end of copied text >

## 2018-05-12 NOTE — PROGRESS NOTE ADULT - SUBJECTIVE AND OBJECTIVE BOX
Patient is a 77y old  Female who presents with a chief complaint of fever (11 Apr 2018 14:25)      INTERVAL HPI/OVERNIGHT EVENTS:noted, feels well      Vital Signs Last 24 Hrs  T(C): 36.5 (12 May 2018 06:15), Max: 37.2 (11 May 2018 20:21)  T(F): 97.7 (12 May 2018 06:15), Max: 99 (11 May 2018 20:21)  HR: 99 (12 May 2018 06:15) (80 - 99)  BP: 107/74 (12 May 2018 06:15) (107/74 - 126/72)  BP(mean): --  RR: 17 (12 May 2018 06:15) (17 - 20)  SpO2: 95% (12 May 2018 06:15) (95% - 97%)    acetaminophen   Tablet 650 milliGRAM(s) Oral every 6 hours PRN  acetaminophen   Tablet. 650 milliGRAM(s) Oral every 6 hours PRN  ascorbic acid 500 milliGRAM(s) Oral daily  aspirin enteric coated 81 milliGRAM(s) Oral daily  bisacodyl Suppository 10 milliGRAM(s) Rectal daily PRN  buDESOnide 160 MICROgram(s)/formoterol 4.5 MICROgram(s) Inhaler 2 Puff(s) Inhalation two times a day  calcium carbonate 1250 mG + Vitamin D (OsCal 500 + D) 1 Tablet(s) Oral three times a day  Dakins Solution - 1/4 Strength 1 Application(s) Topical three times a day  docusate sodium 100 milliGRAM(s) Oral three times a day  ferrous    sulfate 325 milliGRAM(s) Oral daily  folic acid 1 milliGRAM(s) Oral daily  HYDROmorphone   Tablet 2 milliGRAM(s) Oral every 4 hours PRN  HYDROmorphone  Injectable 0.5 milliGRAM(s) IV Push daily PRN  lidocaine 1%/EPINEPHrine 1:100,000 Inj 10 milliLiter(s) Local Injection once  lidocaine 2% Viscous 10 milliLiter(s) Swish and Spit every 6 hours PRN  melatonin 3 milliGRAM(s) Oral at bedtime  minocycline 100 milliGRAM(s) Oral two times a day  multivitamin 1 Tablet(s) Oral daily  nystatin    Suspension 979504 Unit(s) Oral every 6 hours  nystatin Powder 1 Application(s) Topical two times a day  pantoprazole    Tablet 40 milliGRAM(s) Oral before breakfast  polyethylene glycol 3350 17 Gram(s) Oral daily  povidone iodine 10% Solution 1 Application(s) Topical every 12 hours  senna 2 Tablet(s) Oral at bedtime PRN  tiotropium 18 MICROgram(s) Capsule 1 Capsule(s) Inhalation daily  zinc oxide 20% Ointment 1 Application(s) Topical daily      PHYSICAL EXAM:  GENERAL: NAD,   EYES: conjunctiva and sclera clear  ENMT: Moist mucous membranes  NECK: Supple, No JVD, Normal thyroid  NERVOUS SYSTEM:  Alert & Oriented X3,   CHEST/LUNG: Clear to auscultation bilaterally; No rales, rhonchi, wheezing, or rubs  HEART: Regular rate and rhythm; No murmurs, rubs, or gallops  ABDOMEN: soft , NT abd wound dressing  EXTREMITIES:  RT thigh wound vac, knee dressing  LYMPH: No lymphadenopathy noted  SKIN: No rashes or lesions    Consultant(s) Notes Reviewed:  [x ] YES  [ ] NO  Care Discussed with Consultants/Other Providers [ x] YES  [ ] NO    LABS:                        8.4    12.07 )-----------( 302      ( 12 May 2018 09:05 )             27.9     05-11    140  |  99  |  12  ----------------------------<  103<H>  3.5   |  30  |  0.60    Ca    9.2      11 May 2018 06:59      PT/INR - ( 12 May 2018 09:05 )   PT: 29.5 sec;   INR: 2.56 ratio             CAPILLARY BLOOD GLUCOSE                RADIOLOGY & ADDITIONAL TESTS:    Imaging Personally Reviewed:  [ ] YES  [ ] NO

## 2018-05-12 NOTE — PROGRESS NOTE ADULT - ASSESSMENT
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 She now completed  6 weeks of dapto and now on  minocycline She was treated for pneumonia. She had hematoma of right thigh and abdomen wall with fat and skin necrosis so had debridement done.   no fever     Plan:   continue minocycline as planned   wound care as per plastics   supportive care as per medicine, plastics and ortho teams

## 2018-05-12 NOTE — PROGRESS NOTE ADULT - ASSESSMENT
A: 77y old  Female who presents with a chief complaint of fever (11 Apr 2018 14:25). Wounds  s/p debridement, will need continued dressing changes TID.    P:  - Please continue with TID Dakins moistened gauze dressing change to abdominal wound.  - Please continue with TID Saline WTD gauze on medial aspect of knee  - VAC change Monday  - Continue care per primary team

## 2018-05-12 NOTE — PROGRESS NOTE ADULT - ASSESSMENT
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 cultures NTD. fever resolved.     # Recent septic Rt TKR - abx spacer exchanged and plastics complex wound closure  # PMR on chronic steroids  # thigh wound,   # right knee eschar, right thigh necrosis and abd wound skin breakdown  #Anemia    Plan:  appreciate plastics discussion with family, cont local wound care as family declining surgery  sp multiple debridements, cont wet to dry dressing abd and knee wounds TID  wound vac for thigh wound  remains afebrile, cont suppressive minocycline, appreciate ID recs  appreciate psych eval  appreciate ortho recs  heme follow up  will cont coumadin and asa  pain control on oxy IR 10mg BID and dilaudid 2mg q4 prn  PT OOB encourage ambulation, dw patient importance of turning and trying to get out of bed  bowel regimen    plan of care dw patient

## 2018-05-12 NOTE — PROGRESS NOTE ADULT - SUBJECTIVE AND OBJECTIVE BOX
PLASTIC SURGERY DAILY PROGRESS NOTE:    Subjective:  Patient seen and examined. No acute events overnight.     Objective:  Vital Signs Last 24 Hrs  T(C): 36.5 (12 May 2018 06:15), Max: 37.2 (11 May 2018 20:21)  T(F): 97.7 (12 May 2018 06:15), Max: 99 (11 May 2018 20:21)  HR: 99 (12 May 2018 06:15) (80 - 99)  BP: 107/74 (12 May 2018 06:15) (107/74 - 126/72)  BP(mean): --  RR: 17 (12 May 2018 06:15) (17 - 20)  SpO2: 95% (12 May 2018 06:15) (95% - 97%)    I&O's Detail    11 May 2018 07:01  -  12 May 2018 07:00  --------------------------------------------------------  IN:    Oral Fluid: 1150 mL  Total IN: 1150 mL    OUT:    Voided: 300 mL  Total OUT: 300 mL    Total NET: 850 mL    MEDICATIONS  (STANDING):  ascorbic acid 500 milliGRAM(s) Oral daily  aspirin enteric coated 81 milliGRAM(s) Oral daily  buDESOnide 160 MICROgram(s)/formoterol 4.5 MICROgram(s) Inhaler 2 Puff(s) Inhalation two times a day  calcium carbonate 1250 mG + Vitamin D (OsCal 500 + D) 1 Tablet(s) Oral three times a day  Dakins Solution - 1/4 Strength 1 Application(s) Topical three times a day  docusate sodium 100 milliGRAM(s) Oral three times a day  ferrous    sulfate 325 milliGRAM(s) Oral daily  folic acid 1 milliGRAM(s) Oral daily  lidocaine 1%/EPINEPHrine 1:100,000 Inj 10 milliLiter(s) Local Injection once  melatonin 3 milliGRAM(s) Oral at bedtime  minocycline 100 milliGRAM(s) Oral two times a day  multivitamin 1 Tablet(s) Oral daily  nystatin    Suspension 028869 Unit(s) Oral every 6 hours  nystatin Powder 1 Application(s) Topical two times a day  pantoprazole    Tablet 40 milliGRAM(s) Oral before breakfast  polyethylene glycol 3350 17 Gram(s) Oral daily  povidone iodine 10% Solution 1 Application(s) Topical every 12 hours  tiotropium 18 MICROgram(s) Capsule 1 Capsule(s) Inhalation daily  zinc oxide 20% Ointment 1 Application(s) Topical daily    MEDICATIONS  (PRN):  acetaminophen   Tablet 650 milliGRAM(s) Oral every 6 hours PRN For Temp greater than 38 C (100.4 F)  acetaminophen   Tablet. 650 milliGRAM(s) Oral every 6 hours PRN Mild Pain (1 - 3)  bisacodyl Suppository 10 milliGRAM(s) Rectal daily PRN Constipation  HYDROmorphone   Tablet 2 milliGRAM(s) Oral every 4 hours PRN Severe Pain (7 - 10)  HYDROmorphone  Injectable 0.5 milliGRAM(s) IV Push daily PRN Dressing change  lidocaine 2% Viscous 10 milliLiter(s) Swish and Spit every 6 hours PRN Mouth Sores  senna 2 Tablet(s) Oral at bedtime PRN Constipation    PHYSICAL EXAM:  General: Resting in bed  MSK: R upper thigh with VAC in place that was changed yesterday.  Knee with stable eschar with medial area open, wound base appears clean, no purulence, wet-to-dry dressing applied  Abdomen: Infra-Umbilical wound with tracking, malodor, worsening surrounding erythema, necrotic rim, getting Dakins wet-to-dry gauze. fibrinous exudate noted

## 2018-05-12 NOTE — PROGRESS NOTE ADULT - ASSESSMENT
no plan for orthopedic surgical intervention per family and patient preference  local wound care per plastics and wound care team to abdomen and knee, VAC placed on thigh wound, pending vac to abdomen  PO abx per ID team  encourage patient to spend majority of bed oob in bedside chair as able, has not been OOB in some time, she refused yesterday to get oob with pt  PT to help mobilize, she must remain 50% wb on the RLE and NO KNEE MOTION allowed, knee immobilizer if oob  coumadin, inr goal 2-3  continue to optimize nutrition  continue to involve psych, remains emotional at times, in good spirits today	  dispo planning when deemed optimized per prs/wound care    Nate Bass MD

## 2018-05-12 NOTE — PROGRESS NOTE ADULT - ATTENDING COMMENTS
case seen and examined   as above-  Pulm relatively stable-atelectasis, prior PE, asthma, cardiac Dz  Inhaler regimen as outlined above  DVT rx /prophylaxis  ID follow up of ABX  completed 5/5--then minocycline  (debridement done 5/5)--following WBC   PT evaluation and Psych evaluation    ortho/plastics follow up-knee immobilizer/PT--advance ambulation as able  Rehab pending-pulm. cleared for DC to rehab.    Jennifer Tristan covering for dr Everett Kumar MD-Pulmonary

## 2018-05-13 LAB
HCT VFR BLD CALC: 25.9 % — LOW (ref 34.5–45)
HGB BLD-MCNC: 8.1 G/DL — LOW (ref 11.5–15.5)
INR BLD: 2.97 RATIO — HIGH (ref 0.88–1.16)
MCHC RBC-ENTMCNC: 27.6 PG — SIGNIFICANT CHANGE UP (ref 27–34)
MCHC RBC-ENTMCNC: 31.3 GM/DL — LOW (ref 32–36)
MCV RBC AUTO: 88.4 FL — SIGNIFICANT CHANGE UP (ref 80–100)
PLATELET # BLD AUTO: 263 K/UL — SIGNIFICANT CHANGE UP (ref 150–400)
PROTHROM AB SERPL-ACNC: 34.3 SEC — HIGH (ref 10–13.1)
RBC # BLD: 2.93 M/UL — LOW (ref 3.8–5.2)
RBC # FLD: 17.2 % — HIGH (ref 10.3–14.5)
WBC # BLD: 11.27 K/UL — HIGH (ref 3.8–10.5)
WBC # FLD AUTO: 11.27 K/UL — HIGH (ref 3.8–10.5)

## 2018-05-13 RX ORDER — WARFARIN SODIUM 2.5 MG/1
0.5 TABLET ORAL ONCE
Qty: 0 | Refills: 0 | Status: COMPLETED | OUTPATIENT
Start: 2018-05-13 | End: 2018-05-13

## 2018-05-13 RX ADMIN — Medication 1 TABLET(S): at 21:57

## 2018-05-13 RX ADMIN — Medication 500 MILLIGRAM(S): at 13:40

## 2018-05-13 RX ADMIN — Medication 1 TABLET(S): at 17:30

## 2018-05-13 RX ADMIN — Medication 325 MILLIGRAM(S): at 13:39

## 2018-05-13 RX ADMIN — Medication 500000 UNIT(S): at 17:36

## 2018-05-13 RX ADMIN — NYSTATIN CREAM 1 APPLICATION(S): 100000 CREAM TOPICAL at 13:53

## 2018-05-13 RX ADMIN — HYDROMORPHONE HYDROCHLORIDE 2 MILLIGRAM(S): 2 INJECTION INTRAMUSCULAR; INTRAVENOUS; SUBCUTANEOUS at 13:36

## 2018-05-13 RX ADMIN — NYSTATIN CREAM 1 APPLICATION(S): 100000 CREAM TOPICAL at 00:41

## 2018-05-13 RX ADMIN — TIOTROPIUM BROMIDE 1 CAPSULE(S): 18 CAPSULE ORAL; RESPIRATORY (INHALATION) at 13:41

## 2018-05-13 RX ADMIN — Medication 3 MILLIGRAM(S): at 21:57

## 2018-05-13 RX ADMIN — Medication 1 MILLIGRAM(S): at 13:39

## 2018-05-13 RX ADMIN — WARFARIN SODIUM 0.5 MILLIGRAM(S): 2.5 TABLET ORAL at 21:57

## 2018-05-13 RX ADMIN — HYDROMORPHONE HYDROCHLORIDE 2 MILLIGRAM(S): 2 INJECTION INTRAMUSCULAR; INTRAVENOUS; SUBCUTANEOUS at 14:15

## 2018-05-13 RX ADMIN — Medication 81 MILLIGRAM(S): at 13:39

## 2018-05-13 RX ADMIN — HYDROMORPHONE HYDROCHLORIDE 0.5 MILLIGRAM(S): 2 INJECTION INTRAMUSCULAR; INTRAVENOUS; SUBCUTANEOUS at 07:27

## 2018-05-13 RX ADMIN — ZINC OXIDE 1 APPLICATION(S): 200 OINTMENT TOPICAL at 13:42

## 2018-05-13 RX ADMIN — HYDROMORPHONE HYDROCHLORIDE 0.5 MILLIGRAM(S): 2 INJECTION INTRAMUSCULAR; INTRAVENOUS; SUBCUTANEOUS at 07:40

## 2018-05-13 RX ADMIN — HYDROMORPHONE HYDROCHLORIDE 2 MILLIGRAM(S): 2 INJECTION INTRAMUSCULAR; INTRAVENOUS; SUBCUTANEOUS at 10:00

## 2018-05-13 RX ADMIN — Medication 1 TABLET(S): at 13:40

## 2018-05-13 RX ADMIN — MINOCYCLINE HYDROCHLORIDE 100 MILLIGRAM(S): 45 TABLET, EXTENDED RELEASE ORAL at 17:36

## 2018-05-13 RX ADMIN — Medication 100 MILLIGRAM(S): at 21:57

## 2018-05-13 RX ADMIN — HYDROMORPHONE HYDROCHLORIDE 2 MILLIGRAM(S): 2 INJECTION INTRAMUSCULAR; INTRAVENOUS; SUBCUTANEOUS at 22:27

## 2018-05-13 RX ADMIN — HYDROMORPHONE HYDROCHLORIDE 2 MILLIGRAM(S): 2 INJECTION INTRAMUSCULAR; INTRAVENOUS; SUBCUTANEOUS at 09:17

## 2018-05-13 RX ADMIN — Medication 100 MILLIGRAM(S): at 13:39

## 2018-05-13 RX ADMIN — BUDESONIDE AND FORMOTEROL FUMARATE DIHYDRATE 2 PUFF(S): 160; 4.5 AEROSOL RESPIRATORY (INHALATION) at 17:36

## 2018-05-13 RX ADMIN — HYDROMORPHONE HYDROCHLORIDE 2 MILLIGRAM(S): 2 INJECTION INTRAMUSCULAR; INTRAVENOUS; SUBCUTANEOUS at 17:36

## 2018-05-13 RX ADMIN — HYDROMORPHONE HYDROCHLORIDE 2 MILLIGRAM(S): 2 INJECTION INTRAMUSCULAR; INTRAVENOUS; SUBCUTANEOUS at 21:57

## 2018-05-13 RX ADMIN — Medication 500000 UNIT(S): at 22:48

## 2018-05-13 RX ADMIN — Medication 1 APPLICATION(S): at 17:36

## 2018-05-13 RX ADMIN — NYSTATIN CREAM 1 APPLICATION(S): 100000 CREAM TOPICAL at 22:48

## 2018-05-13 RX ADMIN — Medication 500000 UNIT(S): at 13:40

## 2018-05-13 RX ADMIN — Medication 1 APPLICATION(S): at 14:00

## 2018-05-13 NOTE — PROGRESS NOTE ADULT - ASSESSMENT
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     # Recent septic Rt TKR - abx spacer exchanged and plastics complex wound closure  # PMR on chronic steroids  # thigh wound,   # right knee eschar, right thigh necrosis and abd wound skin breakdown  # Anemia    Plan:  Plastics and otho on the case. f/u appreciated.  cont local wound care as family declining surgery  s/p multiple debridements, cont wet to dry dressing abd and knee wounds TID  wound vac for thigh wound as appropriate.   remains afebrile, cont suppressive minocycline, appreciate ID f/u.   appreciate psych eval  heme follow up  cont coumadin and asa  pain control on oxy IR 10mg BID and dilaudid 2mg q4 prn  PT OOB encourage ambulation, dw patient importance of turning and trying to get out of bed  bowel regimen    Covering for Dr. Murphy today. She will be back tomorrow 5/14/18.     d/w pt and her aide at bedside. All questions answered.  Counseled pt to let the team change dressings regularly and to not refuse.     - Dr. DENISSE Palumbo ([facility])  - [phone]

## 2018-05-13 NOTE — PROGRESS NOTE ADULT - SUBJECTIVE AND OBJECTIVE BOX
SUBJECTIVE:  Doing well.   Overnight, patient again refused dressing changes.     OBJECTIVE:     ** VITAL SIGNS / I&O's **    T(C): 36.3 (05-13-18 @ 04:36), Max: 37.2 (05-12-18 @ 22:25)  T(F): 97.3 (05-13-18 @ 04:36), Max: 99 (05-12-18 @ 22:25)  HR: 81 (05-13-18 @ 04:36) (74 - 104)  BP: 118/62 (05-13-18 @ 04:36) (100/54 - 143/68)  RR: 18 (05-13-18 @ 04:36) (18 - 18)  SpO2: 97% (05-13-18 @ 04:36) (94% - 99%)      12 May 2018 07:01  -  13 May 2018 07:00  --------------------------------------------------------  IN:    Oral Fluid: 1290 mL  Total IN: 1290 mL    OUT:  Total OUT: 0 mL    Total NET: 1290 mL          ** PHYSICAL EXAM **    -- CONSTITUTIONAL: AOx3. NAD.   -- CARDIOVASCULAR: Regular rate and rhythm. S1, S2.  -- RESPIRATORY: Bilateral breath sounds.   -- ABDOMEN: Open wound of Right/Mid lower quadrant. Approximately 4cm diameter opening. Necrotic debris/fat along rim of depression. Foul odor. This material was debrided sharply with scissors. Fibrinous exudate present throughout several parts of the wound. There was tracking/tunnelling of the wound in the 10 o'clock to 2 o'clock position for a distance of 4-5cm. The wound was then packed with Dakins-moistened kathrin wrap and covered with an ABD pad.  -- EXTREMITIES: The right thigh wound has a VAC in place. Over the knee there is an eschar measuring approximately 10cm in diameter. The medial aspect demonstrates dehiscence from the intact skin edge. There is no tunnelling or undermining. There is no drainage or active bleeding. This wound was packed with normal-saline-moistened gauze 4x4s and covered with an ABD pad.

## 2018-05-13 NOTE — PROGRESS NOTE ADULT - ASSESSMENT
77F with complex PMH/PSH who recently had placement of a right knee antibiotic spacer followed by PRS closure c/b skin necrosis and formation of an eschar over the anterior knee joint. The patient was readmitted with fevers and right thigh cellulitis which was complicated by skin breakdown. Patient also developed wound at the right/mid lower quadrant of the abdomen.  >> Wound care orders clarified with patient, patient's aide, and nurse. I counseled the patient and the patient's aide with the daytime nurse present in the room that the patient should not refuse dressing changes. I explained that this will prolong the healing process. The patient understood but did not accept this counseling. Wound care orders updated in computer. Changed timing of dressing changes so that they follow meals.   >> Medical optimization as per primary team.  >> DVT prophylaxis.  >> Protein supplementation of diet to encourage wound healing.   >> Discussed with Dr. Mg.     [facility] Plastic Surgery Pager -- [phone]  [facility] Plastic Surgery Pager -- [phone]

## 2018-05-13 NOTE — PROGRESS NOTE ADULT - SUBJECTIVE AND OBJECTIVE BOX
Patient is a 77y old  Female who presents with a chief complaint of fever (11 Apr 2018 14:25)      SUBJECTIVE / OVERNIGHT EVENTS:  feel well. the aide at bedside.  pain is control.  no overnight event.   seen by plastics and dressing changed today.   no cp, no sob. no n/v/d.     Vital Signs Last 24 Hrs  T(C): 36.7 (13 May 2018 10:00), Max: 37.2 (12 May 2018 22:25)  T(F): 98 (13 May 2018 10:00), Max: 99 (12 May 2018 22:25)  HR: 82 (13 May 2018 10:00) (74 - 82)  BP: 125/72 (13 May 2018 10:00) (100/54 - 143/68)  BP(mean): --  RR: 18 (13 May 2018 10:00) (18 - 18)  SpO2: 97% (13 May 2018 10:00) (94% - 99%)  I&O's Summary    12 May 2018 07:01  -  13 May 2018 07:00  --------------------------------------------------------  IN: 1290 mL / OUT: 0 mL / NET: 1290 mL    13 May 2018 07:01  -  13 May 2018 15:33  --------------------------------------------------------  IN: 600 mL / OUT: 0 mL / NET: 600 mL      PHYSICAL EXAM:  GENERAL: NAD, comfortable in bed.   EYES: conjunctiva and sclera clear  ENMT: Moist mucous membranes, PERRlL  NECK: Supple, No JVD  NERVOUS SYSTEM:  Alert & Oriented X3.   CHEST/LUNG: Clear to auscultation bilaterally; No rales, rhonchi, wheezing, or rubs  HEART: Regular rate and rhythm; No murmurs, rubs, or gallops  ABDOMEN: soft , NT abd wound dressing, she won't let me open to look.   EXTREMITIES:  RT thigh wound vac, knee dressing with eschar underneath. dressing d/c/i.   SKIN: No rashes or lesions      LABS:                        8.1    11.27 )-----------( 263      ( 13 May 2018 09:15 )             25.9           PT/INR - ( 13 May 2018 09:17 )   PT: 34.3 sec;   INR: 2.97 ratio           CAPILLARY BLOOD GLUCOSE                RADIOLOGY & ADDITIONAL TESTS:    Imaging Personally Reviewed:  [x] YES  [ ] NO    Consultant(s) Notes Reviewed:  [x] YES  [ ] NO      MEDICATIONS  (STANDING):  ascorbic acid 500 milliGRAM(s) Oral daily  aspirin enteric coated 81 milliGRAM(s) Oral daily  buDESOnide 160 MICROgram(s)/formoterol 4.5 MICROgram(s) Inhaler 2 Puff(s) Inhalation two times a day  calcium carbonate 1250 mG + Vitamin D (OsCal 500 + D) 1 Tablet(s) Oral three times a day  Dakins Solution - 1/4 Strength 1 Application(s) Topical three times a day  docusate sodium 100 milliGRAM(s) Oral three times a day  ferrous    sulfate 325 milliGRAM(s) Oral daily  folic acid 1 milliGRAM(s) Oral daily  lidocaine 1%/EPINEPHrine 1:100,000 Inj 10 milliLiter(s) Local Injection once  melatonin 3 milliGRAM(s) Oral at bedtime  minocycline 100 milliGRAM(s) Oral two times a day  multivitamin 1 Tablet(s) Oral daily  nystatin    Suspension 520319 Unit(s) Oral every 6 hours  nystatin Powder 1 Application(s) Topical two times a day  pantoprazole    Tablet 40 milliGRAM(s) Oral before breakfast  polyethylene glycol 3350 17 Gram(s) Oral daily  povidone iodine 10% Solution 1 Application(s) Topical every 12 hours  tiotropium 18 MICROgram(s) Capsule 1 Capsule(s) Inhalation daily  warfarin 0.5 milliGRAM(s) Oral once  zinc oxide 20% Ointment 1 Application(s) Topical daily    MEDICATIONS  (PRN):  acetaminophen   Tablet 650 milliGRAM(s) Oral every 6 hours PRN For Temp greater than 38 C (100.4 F)  acetaminophen   Tablet. 650 milliGRAM(s) Oral every 6 hours PRN Mild Pain (1 - 3)  bisacodyl Suppository 10 milliGRAM(s) Rectal daily PRN Constipation  HYDROmorphone   Tablet 2 milliGRAM(s) Oral every 4 hours PRN Severe Pain (7 - 10)  HYDROmorphone  Injectable 0.5 milliGRAM(s) IV Push daily PRN Dressing change  lidocaine 2% Viscous 10 milliLiter(s) Swish and Spit every 6 hours PRN Mouth Sores  senna 2 Tablet(s) Oral at bedtime PRN Constipation      Care Discussed with Consultants/Other Providers [x] YES  [ ] NO    HEALTH ISSUES - PROBLEM Dx:  Adjustment disorder, unspecified type  Delirium due to another medical condition  Pneumonia: Pneumonia  Aortic stenosis, severe: Aortic stenosis, severe  MYAH (obstructive sleep apnea): MYAH (obstructive sleep apnea)  Obesity: Obesity  Asthma: Asthma  SOB (shortness of breath): SOB (shortness of breath)  Chronic deep vein thrombosis (DVT) of lower extremity, unspecified laterality, unspecified vein: Chronic deep vein thrombosis (DVT) of lower extremity, unspecified laterality, unspecified vein  Arthralgia of knee, unspecified laterality: Arthralgia of knee, unspecified laterality  Hyperlipidemia, unspecified hyperlipidemia type: Hyperlipidemia, unspecified hyperlipidemia type  Asthma, unspecified asthma severity, unspecified whether complicated, unspecified whether persistent: Asthma, unspecified asthma severity, unspecified whether complicated, unspecified whether persistent  Coronary artery disease involving native coronary artery of native heart without angina pectoris: Coronary artery disease involving native coronary artery of native heart without angina pectoris  Gastroesophageal reflux disease, esophagitis presence not specified: Gastroesophageal reflux disease, esophagitis presence not specified  Fever, unspecified fever cause: Fever, unspecified fever cause

## 2018-05-14 LAB
ANION GAP SERPL CALC-SCNC: 11 MMOL/L — SIGNIFICANT CHANGE UP (ref 5–17)
BUN SERPL-MCNC: 10 MG/DL — SIGNIFICANT CHANGE UP (ref 7–23)
CALCIUM SERPL-MCNC: 8.9 MG/DL — SIGNIFICANT CHANGE UP (ref 8.4–10.5)
CHLORIDE SERPL-SCNC: 99 MMOL/L — SIGNIFICANT CHANGE UP (ref 96–108)
CO2 SERPL-SCNC: 30 MMOL/L — SIGNIFICANT CHANGE UP (ref 22–31)
CREAT SERPL-MCNC: 0.61 MG/DL — SIGNIFICANT CHANGE UP (ref 0.5–1.3)
GLUCOSE SERPL-MCNC: 111 MG/DL — HIGH (ref 70–99)
HCT VFR BLD CALC: 26.1 % — LOW (ref 34.5–45)
HGB BLD-MCNC: 8.1 G/DL — LOW (ref 11.5–15.5)
INR BLD: 2.65 RATIO — HIGH (ref 0.88–1.16)
MAGNESIUM SERPL-MCNC: 1.4 MG/DL — LOW (ref 1.6–2.6)
MCHC RBC-ENTMCNC: 27 PG — SIGNIFICANT CHANGE UP (ref 27–34)
MCHC RBC-ENTMCNC: 31 GM/DL — LOW (ref 32–36)
MCV RBC AUTO: 87 FL — SIGNIFICANT CHANGE UP (ref 80–100)
PLATELET # BLD AUTO: 281 K/UL — SIGNIFICANT CHANGE UP (ref 150–400)
POTASSIUM SERPL-MCNC: 3.6 MMOL/L — SIGNIFICANT CHANGE UP (ref 3.5–5.3)
POTASSIUM SERPL-SCNC: 3.6 MMOL/L — SIGNIFICANT CHANGE UP (ref 3.5–5.3)
PROTHROM AB SERPL-ACNC: 30.6 SEC — HIGH (ref 10–13.1)
RBC # BLD: 3 M/UL — LOW (ref 3.8–5.2)
RBC # FLD: 17.6 % — HIGH (ref 10.3–14.5)
SODIUM SERPL-SCNC: 140 MMOL/L — SIGNIFICANT CHANGE UP (ref 135–145)
WBC # BLD: 10.41 K/UL — SIGNIFICANT CHANGE UP (ref 3.8–10.5)
WBC # FLD AUTO: 10.41 K/UL — SIGNIFICANT CHANGE UP (ref 3.8–10.5)

## 2018-05-14 PROCEDURE — 99232 SBSQ HOSP IP/OBS MODERATE 35: CPT

## 2018-05-14 RX ORDER — HYDROMORPHONE HYDROCHLORIDE 2 MG/ML
0.5 INJECTION INTRAMUSCULAR; INTRAVENOUS; SUBCUTANEOUS ONCE
Qty: 0 | Refills: 0 | Status: DISCONTINUED | OUTPATIENT
Start: 2018-05-14 | End: 2018-05-14

## 2018-05-14 RX ORDER — MAGNESIUM SULFATE 500 MG/ML
2 VIAL (ML) INJECTION ONCE
Qty: 0 | Refills: 0 | Status: COMPLETED | OUTPATIENT
Start: 2018-05-14 | End: 2018-05-14

## 2018-05-14 RX ORDER — WARFARIN SODIUM 2.5 MG/1
0.5 TABLET ORAL ONCE
Qty: 0 | Refills: 0 | Status: COMPLETED | OUTPATIENT
Start: 2018-05-14 | End: 2018-05-14

## 2018-05-14 RX ORDER — LIDOCAINE 4 G/100G
1 CREAM TOPICAL DAILY
Qty: 0 | Refills: 0 | Status: DISCONTINUED | OUTPATIENT
Start: 2018-05-14 | End: 2018-06-06

## 2018-05-14 RX ADMIN — Medication 650 MILLIGRAM(S): at 08:35

## 2018-05-14 RX ADMIN — POLYETHYLENE GLYCOL 3350 17 GRAM(S): 17 POWDER, FOR SOLUTION ORAL at 12:10

## 2018-05-14 RX ADMIN — ZINC OXIDE 1 APPLICATION(S): 200 OINTMENT TOPICAL at 12:12

## 2018-05-14 RX ADMIN — Medication 1 APPLICATION(S): at 18:14

## 2018-05-14 RX ADMIN — TIOTROPIUM BROMIDE 1 CAPSULE(S): 18 CAPSULE ORAL; RESPIRATORY (INHALATION) at 13:27

## 2018-05-14 RX ADMIN — HYDROMORPHONE HYDROCHLORIDE 2 MILLIGRAM(S): 2 INJECTION INTRAMUSCULAR; INTRAVENOUS; SUBCUTANEOUS at 14:05

## 2018-05-14 RX ADMIN — Medication 325 MILLIGRAM(S): at 12:09

## 2018-05-14 RX ADMIN — Medication 650 MILLIGRAM(S): at 22:57

## 2018-05-14 RX ADMIN — Medication 650 MILLIGRAM(S): at 23:27

## 2018-05-14 RX ADMIN — HYDROMORPHONE HYDROCHLORIDE 2 MILLIGRAM(S): 2 INJECTION INTRAMUSCULAR; INTRAVENOUS; SUBCUTANEOUS at 18:45

## 2018-05-14 RX ADMIN — Medication 3 MILLIGRAM(S): at 22:16

## 2018-05-14 RX ADMIN — Medication 500000 UNIT(S): at 12:10

## 2018-05-14 RX ADMIN — HYDROMORPHONE HYDROCHLORIDE 2 MILLIGRAM(S): 2 INJECTION INTRAMUSCULAR; INTRAVENOUS; SUBCUTANEOUS at 18:11

## 2018-05-14 RX ADMIN — Medication 650 MILLIGRAM(S): at 09:05

## 2018-05-14 RX ADMIN — Medication 1 APPLICATION(S): at 13:21

## 2018-05-14 RX ADMIN — Medication 500000 UNIT(S): at 06:19

## 2018-05-14 RX ADMIN — PANTOPRAZOLE SODIUM 40 MILLIGRAM(S): 20 TABLET, DELAYED RELEASE ORAL at 06:19

## 2018-05-14 RX ADMIN — HYDROMORPHONE HYDROCHLORIDE 0.5 MILLIGRAM(S): 2 INJECTION INTRAMUSCULAR; INTRAVENOUS; SUBCUTANEOUS at 10:32

## 2018-05-14 RX ADMIN — Medication 500000 UNIT(S): at 18:13

## 2018-05-14 RX ADMIN — MINOCYCLINE HYDROCHLORIDE 100 MILLIGRAM(S): 45 TABLET, EXTENDED RELEASE ORAL at 18:12

## 2018-05-14 RX ADMIN — Medication 100 MILLIGRAM(S): at 06:19

## 2018-05-14 RX ADMIN — Medication 81 MILLIGRAM(S): at 12:09

## 2018-05-14 RX ADMIN — MINOCYCLINE HYDROCHLORIDE 100 MILLIGRAM(S): 45 TABLET, EXTENDED RELEASE ORAL at 06:19

## 2018-05-14 RX ADMIN — HYDROMORPHONE HYDROCHLORIDE 2 MILLIGRAM(S): 2 INJECTION INTRAMUSCULAR; INTRAVENOUS; SUBCUTANEOUS at 06:19

## 2018-05-14 RX ADMIN — NYSTATIN CREAM 1 APPLICATION(S): 100000 CREAM TOPICAL at 23:57

## 2018-05-14 RX ADMIN — NYSTATIN CREAM 1 APPLICATION(S): 100000 CREAM TOPICAL at 12:13

## 2018-05-14 RX ADMIN — HYDROMORPHONE HYDROCHLORIDE 2 MILLIGRAM(S): 2 INJECTION INTRAMUSCULAR; INTRAVENOUS; SUBCUTANEOUS at 14:35

## 2018-05-14 RX ADMIN — Medication 500000 UNIT(S): at 23:57

## 2018-05-14 RX ADMIN — WARFARIN SODIUM 0.5 MILLIGRAM(S): 2.5 TABLET ORAL at 22:16

## 2018-05-14 RX ADMIN — Medication 100 MILLIGRAM(S): at 22:16

## 2018-05-14 RX ADMIN — Medication 50 GRAM(S): at 18:42

## 2018-05-14 RX ADMIN — Medication 500 MILLIGRAM(S): at 12:09

## 2018-05-14 RX ADMIN — Medication 1 MILLIGRAM(S): at 12:09

## 2018-05-14 RX ADMIN — BUDESONIDE AND FORMOTEROL FUMARATE DIHYDRATE 2 PUFF(S): 160; 4.5 AEROSOL RESPIRATORY (INHALATION) at 06:19

## 2018-05-14 RX ADMIN — Medication 1 TABLET(S): at 06:19

## 2018-05-14 RX ADMIN — HYDROMORPHONE HYDROCHLORIDE 2 MILLIGRAM(S): 2 INJECTION INTRAMUSCULAR; INTRAVENOUS; SUBCUTANEOUS at 06:49

## 2018-05-14 RX ADMIN — BUDESONIDE AND FORMOTEROL FUMARATE DIHYDRATE 2 PUFF(S): 160; 4.5 AEROSOL RESPIRATORY (INHALATION) at 18:12

## 2018-05-14 RX ADMIN — HYDROMORPHONE HYDROCHLORIDE 0.5 MILLIGRAM(S): 2 INJECTION INTRAMUSCULAR; INTRAVENOUS; SUBCUTANEOUS at 10:02

## 2018-05-14 RX ADMIN — HYDROMORPHONE HYDROCHLORIDE 0.5 MILLIGRAM(S): 2 INJECTION INTRAMUSCULAR; INTRAVENOUS; SUBCUTANEOUS at 13:17

## 2018-05-14 RX ADMIN — Medication 1 TABLET(S): at 12:09

## 2018-05-14 RX ADMIN — Medication 100 MILLIGRAM(S): at 13:21

## 2018-05-14 NOTE — PROGRESS NOTE ADULT - SUBJECTIVE AND OBJECTIVE BOX
SUBJECTIVE: Pt seen, chart reviewed.        Allergies    amoxicillin (Other)    Intolerances    IV Contrast (Flushing (Skin); Nausea)      aspirin enteric coated 81 milliGRAM(s) Oral daily  minocycline 100 milliGRAM(s) Oral two times a day  nystatin    Suspension 660381 Unit(s) Oral every 6 hours  warfarin 0.5 milliGRAM(s) Oral once	    Physical Exam:  Vital Signs Last 24 Hrs  T(C): 36.8 (14 May 2018 14:06), Max: 36.9 (14 May 2018 06:16)  T(F): 98.3 (14 May 2018 14:06), Max: 98.5 (14 May 2018 06:16)  HR: 78 (14 May 2018 14:06) (78 - 100)  BP: 148/81 (14 May 2018 14:06) (123/63 - 148/81)  BP(mean): --  RR: 18 (14 May 2018 14:06) (18 - 18)  SpO2: 93% (14 May 2018 14:06) (93% - 99%)    Daughter present at time of exam, who discussed analgesic requirements with dressing changes.  This was discussed with medical attending also present.  remains on a [place] bed, using nasal O2 at bedrest  Chronic use of steroids cited as a factor in ability of wounds to heal  All questions  were answered to patient's and daughter's satisfaction  will give trial to topical lidocaine at patient  and daughter request    No odor noted from any wound following dressing changes        LABS:                        8.1    10.41 )-----------( 281      ( 14 May 2018 07:39 )             26.1     PT/INR - ( 14 May 2018 09:47 )   PT: 30.6 sec;   INR: 2.65 ratio             RADIOLOGY & ADDITIONAL STUDIES:  CULTURES:    A/P    con't offloading  con't Nuturition  con't Pericare  Con't per Medicine  F/u as outpatient in [facility]ound [facility] [address] [facility] [phone]

## 2018-05-14 NOTE — PROGRESS NOTE ADULT - PROBLEM SELECTOR PLAN 9
abnormal CT--c/w aspiration  Sp and Sw evaluation--aspiration precautions--ABX as per ID-total of 7 d overlap-completed -s/p daptomycin as of 5/5--now minocycline abnormal CT--c/w aspiration--no longer an issue  Sp and Sw evaluation--aspiration precautions--ABX as per ID-total of 7 d overlap-completed -s/p daptomycin as of 5/5--now minocycline

## 2018-05-14 NOTE — PROGRESS NOTE ADULT - SUBJECTIVE AND OBJECTIVE BOX
Pt is a 77 year old female who was admitted several weeks ago with aspiration pneumonia and fever with right knee pain. She also had proximal thigh swelling and there was a hematoma of the leg as well. She has a history of PMR on steroids. She also has a history of tavr, AICD andf remote bilateral TKR and was found to have strep bacteremia and a ERIKA was negative and she also had a eschar of the right knee as well. She was placed on iv antibiotics and I understand that she will be completing the antibiotics, daptomycin on this date. She told me that she had anemia in the distant past and was placed on antibiotics. She is being seen by the plastics service as well. A ct of th femur was not remarkable for infection and a ct of the chest done several weeks ago showed bibasilar PNA. She was also noted to have a left renal lesion that is unchanged now since 12/2016.     The consult was requested to see if there is anything we can do for her anemia. The counts on the day of this admission was white cell count of 9.71 hemoglobin 8.4 hematocrit 26.4 MCV 86 and MCHC 31.8 and platelet count of [id number] the diff count was 74 polys 15 lymphs and 7 monos the bun was 9 and the creatinine was 0.6     5/4 the pt is stable and the iron studies are still pending at this time. The hemoglobin was 8.4. 5/7 the pt was seen and the notes over charu last few days. The pt will in the am have ferritin and iron studies sent off. 5/8 the ferritin came back as over 300 and therefore she is not iron deficient and the anemia is consistent with chronic disease. The use of epogen might be considered here if hemoglobin drops and can be implemented to reduce blood transfusions. 5/9 pt clearly looks better and repeat hemoglobin was stable at 8.1  PE weak appearing with female and needed help to do normal daily activities large eschar as described on the lower leg and at the time of my arrival, the wound vac was in place and she was being cared for by the staff.  5/8 the ferritin came back at over 300 and therefore she is not iron deficient and epo can be considered if the hemoglobin drops and is in need of blood support. 5/10 pt being evaluated for rehab and consider epo of hemoglobin drops below the 8 range. 5/11 notes reviewed and will order cbc on the weekend and decide on epo.  pmh as above in the body of the report  5/14 the hemoglobin remains at 8.1 and debridement and wound vac and abdominal wound and thigh wounds were being cared for.                        8.1    11.27 )-----------( 235      ( 09 May 2018 09:37 )             26.5   PE pt being cared for by the staff and she appeared to be stable.  MEDICATIONS  (STANDING):  ascorbic acid 500 milliGRAM(s) Oral daily  aspirin enteric coated 81 milliGRAM(s) Oral daily  buDESOnide 160 MICROgram(s)/formoterol 4.5 MICROgram(s) Inhaler 2 Puff(s) Inhalation two times a day  Dakins Solution - 1/4 Strength 1 Application(s) Topical three times a day  docusate sodium 100 milliGRAM(s) Oral three times a day  ferrous    sulfate 325 milliGRAM(s) Oral daily  folic acid 1 milliGRAM(s) Oral daily  magnesium sulfate  IVPB 2 Gram(s) IV Intermittent once  melatonin 3 milliGRAM(s) Oral at bedtime  minocycline 100 milliGRAM(s) Oral two times a day  multivitamin 1 Tablet(s) Oral daily  nystatin    Suspension 779442 Unit(s) Oral every 6 hours  nystatin Powder 1 Application(s) Topical two times a day  pantoprazole    Tablet 40 milliGRAM(s) Oral before breakfast  polyethylene glycol 3350 17 Gram(s) Oral daily  povidone iodine 10% Solution 1 Application(s) Topical every 12 hours  tiotropium 18 MICROgram(s) Capsule 1 Capsule(s) Inhalation daily  warfarin 0.5 milliGRAM(s) Oral once  zinc oxide 20% Ointment 1 Application(s) Topical daily  Vital Signs Last 24 Hrs  T(C): 36.8 (14 May 2018 14:06), Max: 36.9 (14 May 2018 06:16)  T(F): 98.3 (14 May 2018 14:06), Max: 98.5 (14 May 2018 06:16)  HR: 78 (14 May 2018 14:06) (78 - 100)  BP: 148/81 (14 May 2018 14:06) (123/63 - 148/81)  BP(mean): --  RR: 18 (14 May 2018 14:06) (18 - 18)  SpO2: 93% (14 May 2018 14:06) (93% - 99%)

## 2018-05-14 NOTE — PROGRESS NOTE ADULT - PROBLEM SELECTOR PLAN 4
multifactorial-leg and lung--on ABX as per ID (daptomycin-completed 5/5)--next minocycline  need to reconsider other sources of infection-?endocarditis-if fevers persist/recurs multifactorial-leg and lung--on ABX as per ID (daptomycin-completed 5/5)--next minocycline  if re-spikes will need to reconsider other sources of infection-?endocarditis-if fevers persist/recurs

## 2018-05-14 NOTE — PROGRESS NOTE ADULT - ASSESSMENT
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     # Recent septic Rt TKR - abx spacer exchanged and plastics complex wound closure  # PMR on chronic steroids  # thigh wound   # right knee eschar, right thigh necrosis and abd wound   # Anemia    Plan:  Plastics and otho on the case. f/u appreciated.  cont local wound care as family declining surgery  s/p multiple debridements, cont wet to dry dressing abd and knee wounds TID  wound vac for thigh wound as appropriate.   plan to place wound vac to abdominal wound  remains afebrile, cont suppressive minocycline, appreciate ID f/u.   appreciate psych eval  heme follow up  cont coumadin and asa  pain control on oxy IR 10mg BID and dilaudid 2mg q4 prn- encouraged pt to ask for meds if severe pain  PT OOB encourage ambulation, dw patient importance of turning and trying to get out of bed  bowel regimen

## 2018-05-14 NOTE — PROGRESS NOTE ADULT - SUBJECTIVE AND OBJECTIVE BOX
SUBJECTIVE:  Patient experiencing distress with dressing changes    OBJECTIVE:   ** VITAL SIGNS / I&O's **  T(C): 36.9 (05-14-18 @ 06:16), Max: 36.9 (05-14-18 @ 06:16)  HR: 100 (05-14-18 @ 06:16) (82 - 100)  BP: 123/63 (05-14-18 @ 06:16) (123/63 - 125/72)  BP(mean): --  ABP: --  ABP(mean): --  RR: 18 (05-14-18 @ 06:16) (18 - 18)  SpO2: 96% (05-14-18 @ 06:16) (95% - 99%)  Wt(kg): --  CVP(mm Hg): --  CI: --  CAPILLARY BLOOD GLUCOSE       N/A      05-12 @ 07:01  -  05-13 @ 07:00  --------------------------------------------------------  IN:    Oral Fluid: 1290 mL  Total IN: 1290 mL    OUT:  Total OUT: 0 mL    Total NET: 1290 mL      05-13 @ 07:01  -  05-14 @ 06:37  --------------------------------------------------------  IN:    Oral Fluid: 980 mL  Total IN: 980 mL    OUT:    VAC (Vacuum Assisted Closure) System: 10 mL    Voided: 225 mL  Total OUT: 235 mL    Total NET: 745 mL          ** PHYSICAL EXAM **    -- CONSTITUTIONAL: AOx3. NAD.   -- CARDIOVASCULAR: Regular rate and rhythm. S1, S2.  -- RESPIRATORY: Bilateral breath sounds.   -- ABDOMEN: Open wound of Right/Mid lower quadrant. Approximately 4cm diameter opening with eschar at perimeter of wound; packed with Dakins-moistened kathrin wrap and covered with an ABD pad.  Increased malodor  -- EXTREMITIES: The right thigh wound has a VAC in place. Stable eschar overlying patella.  Medial wound with clean base, packed with saline moistened gauze

## 2018-05-14 NOTE — PROGRESS NOTE ADULT - ASSESSMENT
5/14 the hemoglobin remains at 8.1 and debridement and wound vac and abdominal wound and thigh wounds were being cared for.

## 2018-05-14 NOTE — PROGRESS NOTE ADULT - SUBJECTIVE AND OBJECTIVE BOX
CHIEF COMPLAINT:    Interval Events:    REVIEW OF SYSTEMS:  Constitutional: No fevers or chills. No weight loss. No fatigue or generalized malaise.  Eyes: No itching or discharge from the eyes  ENT: No ear pain. No ear discharge. No nasal congestion. No post nasal drip. No epistaxis. No throat pain. No sore throat. No difficulty swallowing.   CV: No chest pain. No palpitations. No lightheadedness or dizziness.   Resp: No dyspnea at rest. No dyspnea on exertion. No orthopnea. No wheezing. No cough. No stridor. No sputum production. No chest pain with respiration.  GI: No nausea. No vomiting. No diarrhea.  MSK: No joint pain or pain in any extremities  Integumentary: No skin lesions. No pedal edema.  Neurological: No gross motor weakness. No sensory changes.  [ ] All other systems negative  [ ] Unable to assess ROS because ________    OBJECTIVE:  ICU Vital Signs Last 24 Hrs  T(C): 36.8 (13 May 2018 20:37), Max: 36.8 (13 May 2018 20:37)  T(F): 98.3 (13 May 2018 20:37), Max: 98.3 (13 May 2018 20:37)  HR: 98 (13 May 2018 20:37) (82 - 100)  BP: 123/88 (13 May 2018 20:37) (123/63 - 125/72)  BP(mean): --  ABP: --  ABP(mean): --  RR: 18 (13 May 2018 20:37) (18 - 18)  SpO2: 95% (13 May 2018 20:37) (95% - 99%)        05-12 @ 07:01  -  05-13 @ 07:00  --------------------------------------------------------  IN: 1290 mL / OUT: 0 mL / NET: 1290 mL    05-13 @ 07:01  -  05-14 @ 05:17  --------------------------------------------------------  IN: 980 mL / OUT: 10 mL / NET: 970 mL      CAPILLARY BLOOD GLUCOSE          PHYSICAL EXAM:  General: Awake, alert, oriented X 3.   HEENT: Atraumatic, normocephalic.                 Mallampatti Grade                 No nasal congestion.                No tonsillar or pharyngeal exudates.  Lymph Nodes: No palpable lymphadenopathy  Neck: No JVD. No carotid bruit.   Respiratory: Normal chest expansion                         Normal percussion                         Normal and equal air entry                         No wheeze, rhonchi or rales.  Cardiovascular: S1 S2 normal. No murmurs, rubs or gallops.   Abdomen: Soft, non-tender, non-distended. No organomegaly.  Extremities: Warm to touch. Peripheral pulse palpable. No pedal edema.   Skin: No rashes or skin lesions  Neurological: Motor and sensory examination equal and normal in all four extremities.  Psychiatry: Appropriate mood and affect.    HOSPITAL MEDICATIONS:  MEDICATIONS  (STANDING):  ascorbic acid 500 milliGRAM(s) Oral daily  aspirin enteric coated 81 milliGRAM(s) Oral daily  buDESOnide 160 MICROgram(s)/formoterol 4.5 MICROgram(s) Inhaler 2 Puff(s) Inhalation two times a day  calcium carbonate 1250 mG + Vitamin D (OsCal 500 + D) 1 Tablet(s) Oral three times a day  Dakins Solution - 1/4 Strength 1 Application(s) Topical three times a day  docusate sodium 100 milliGRAM(s) Oral three times a day  ferrous    sulfate 325 milliGRAM(s) Oral daily  folic acid 1 milliGRAM(s) Oral daily  lidocaine 1%/EPINEPHrine 1:100,000 Inj 10 milliLiter(s) Local Injection once  melatonin 3 milliGRAM(s) Oral at bedtime  minocycline 100 milliGRAM(s) Oral two times a day  multivitamin 1 Tablet(s) Oral daily  nystatin    Suspension 041319 Unit(s) Oral every 6 hours  nystatin Powder 1 Application(s) Topical two times a day  pantoprazole    Tablet 40 milliGRAM(s) Oral before breakfast  polyethylene glycol 3350 17 Gram(s) Oral daily  povidone iodine 10% Solution 1 Application(s) Topical every 12 hours  tiotropium 18 MICROgram(s) Capsule 1 Capsule(s) Inhalation daily  zinc oxide 20% Ointment 1 Application(s) Topical daily    MEDICATIONS  (PRN):  acetaminophen   Tablet 650 milliGRAM(s) Oral every 6 hours PRN For Temp greater than 38 C (100.4 F)  acetaminophen   Tablet. 650 milliGRAM(s) Oral every 6 hours PRN Mild Pain (1 - 3)  bisacodyl Suppository 10 milliGRAM(s) Rectal daily PRN Constipation  HYDROmorphone   Tablet 2 milliGRAM(s) Oral every 4 hours PRN Severe Pain (7 - 10)  HYDROmorphone  Injectable 0.5 milliGRAM(s) IV Push daily PRN Dressing change  lidocaine 2% Viscous 10 milliLiter(s) Swish and Spit every 6 hours PRN Mouth Sores  senna 2 Tablet(s) Oral at bedtime PRN Constipation      LABS:                        8.1    11.27 )-----------( 263      ( 13 May 2018 09:15 )             25.9           PT/INR - ( 13 May 2018 09:17 )   PT: 34.3 sec;   INR: 2.97 ratio                   MICROBIOLOGY:     RADIOLOGY:  [ ] Reviewed and interpreted by me    Point of Care Ultrasound Findings:    PFT:    EKG: CHIEF COMPLAINT: wants out--no sob or cough or significant pain other than wound    Interval Events: ortho and plastics    REVIEW OF SYSTEMS:  Constitutional: No fevers or chills. No weight loss. + fatigue or generalized malaise.  Eyes: No itching or discharge from the eyes  ENT: No ear pain. No ear discharge. No nasal congestion. No post nasal drip. No epistaxis. No throat pain. No sore throat. No difficulty swallowing.   CV: No chest pain. No palpitations. No lightheadedness or dizziness.   Resp: No dyspnea at rest. No dyspnea on exertion. No orthopnea. No wheezing. No cough. No stridor. No sputum production. No chest pain with respiration.  GI: No nausea. No vomiting. No diarrhea.  MSK: No joint pain or pain in any extremities-except RLE  Integumentary: No skin lesions. No pedal edema.  Neurological: No gross motor weakness. No sensory changes.  [+ ] All other systems negative  [ ] Unable to assess ROS because ________    OBJECTIVE:  ICU Vital Signs Last 24 Hrs  T(C): 36.8 (13 May 2018 20:37), Max: 36.8 (13 May 2018 20:37)  T(F): 98.3 (13 May 2018 20:37), Max: 98.3 (13 May 2018 20:37)  HR: 98 (13 May 2018 20:37) (82 - 100)  BP: 123/88 (13 May 2018 20:37) (123/63 - 125/72)  BP(mean): --  ABP: --  ABP(mean): --  RR: 18 (13 May 2018 20:37) (18 - 18)  SpO2: 95% (13 May 2018 20:37) (95% - 99%)        05-12 @ 07:01  -  05-13 @ 07:00  --------------------------------------------------------  IN: 1290 mL / OUT: 0 mL / NET: 1290 mL    05-13 @ 07:01  -  05-14 @ 05:17  --------------------------------------------------------  IN: 980 mL / OUT: 10 mL / NET: 970 mL      CAPILLARY BLOOD GLUCOSE          PHYSICAL EXAM: NAD in bed-on O2  General: Awake, alert, oriented X 3.   HEENT: Atraumatic, normocephalic.                 Mallampatti Grade 3                No nasal congestion.                No tonsillar or pharyngeal exudates.  Lymph Nodes: No palpable lymphadenopathy  Neck: No JVD. No carotid bruit.   Respiratory: abnormal chest expansion-reduced BS bases                         Normal percussion                         Normal and equal air entry                         No wheeze, rhonchi or rales.  Cardiovascular: S1 S2 normal. No murmurs, rubs or gallops.   Abdomen: Soft, non-tender, non-distended. No organomegaly.  Extremities: Warm to touch. Peripheral pulse palpable. No pedal edema. RLE wound  Skin: No rashes or skin lesions  Neurological: Motor and sensory examination equal and normal in all four extremities.  Psychiatry: Appropriate mood and affect.    HOSPITAL MEDICATIONS:  MEDICATIONS  (STANDING):  ascorbic acid 500 milliGRAM(s) Oral daily  aspirin enteric coated 81 milliGRAM(s) Oral daily  buDESOnide 160 MICROgram(s)/formoterol 4.5 MICROgram(s) Inhaler 2 Puff(s) Inhalation two times a day  calcium carbonate 1250 mG + Vitamin D (OsCal 500 + D) 1 Tablet(s) Oral three times a day  Dakins Solution - 1/4 Strength 1 Application(s) Topical three times a day  docusate sodium 100 milliGRAM(s) Oral three times a day  ferrous    sulfate 325 milliGRAM(s) Oral daily  folic acid 1 milliGRAM(s) Oral daily  lidocaine 1%/EPINEPHrine 1:100,000 Inj 10 milliLiter(s) Local Injection once  melatonin 3 milliGRAM(s) Oral at bedtime  minocycline 100 milliGRAM(s) Oral two times a day  multivitamin 1 Tablet(s) Oral daily  nystatin    Suspension 638126 Unit(s) Oral every 6 hours  nystatin Powder 1 Application(s) Topical two times a day  pantoprazole    Tablet 40 milliGRAM(s) Oral before breakfast  polyethylene glycol 3350 17 Gram(s) Oral daily  povidone iodine 10% Solution 1 Application(s) Topical every 12 hours  tiotropium 18 MICROgram(s) Capsule 1 Capsule(s) Inhalation daily  zinc oxide 20% Ointment 1 Application(s) Topical daily    MEDICATIONS  (PRN):  acetaminophen   Tablet 650 milliGRAM(s) Oral every 6 hours PRN For Temp greater than 38 C (100.4 F)  acetaminophen   Tablet. 650 milliGRAM(s) Oral every 6 hours PRN Mild Pain (1 - 3)  bisacodyl Suppository 10 milliGRAM(s) Rectal daily PRN Constipation  HYDROmorphone   Tablet 2 milliGRAM(s) Oral every 4 hours PRN Severe Pain (7 - 10)  HYDROmorphone  Injectable 0.5 milliGRAM(s) IV Push daily PRN Dressing change  lidocaine 2% Viscous 10 milliLiter(s) Swish and Spit every 6 hours PRN Mouth Sores  senna 2 Tablet(s) Oral at bedtime PRN Constipation      LABS:                        8.1    11.27 )-----------( 263      ( 13 May 2018 09:15 )             25.9           PT/INR - ( 13 May 2018 09:17 )   PT: 34.3 sec;   INR: 2.97 ratio                   MICROBIOLOGY:     RADIOLOGY:  [ ] Reviewed and interpreted by me    Point of Care Ultrasound Findings:    PFT:    EKG:

## 2018-05-14 NOTE — PROGRESS NOTE ADULT - ATTENDING COMMENTS
as above-  Pulm relatively stable-atelectasis, prior PE, asthma, cardiac Dz  Inhaler regimen as outlined above  DVT rx /prophylaxis  ID follow up of ABX  completed 5/5--then minocycline  (debridement done 5/5)--following WBC   PT evaluation and Psych evaluation    ortho/plastics follow up-knee immobilizer/PT--advance ambulation as able  Rehab pending-pulm. cleared for DC to rehab.    Everett Kumar MD-Pulmonary   [phone]

## 2018-05-14 NOTE — PROGRESS NOTE ADULT - SUBJECTIVE AND OBJECTIVE BOX
Patient is a 77y old  Female who presents with a chief complaint of fever (11 Apr 2018 14:25)      INTERVAL HPI/OVERNIGHT EVENTS: noted,   c/p pain at thigh wound vac site-vac changed today      Vital Signs Last 24 Hrs  T(C): 36.9 (14 May 2018 06:16), Max: 36.9 (14 May 2018 06:16)  T(F): 98.5 (14 May 2018 06:16), Max: 98.5 (14 May 2018 06:16)  HR: 100 (14 May 2018 06:16) (98 - 100)  BP: 123/63 (14 May 2018 06:16) (123/63 - 123/88)  BP(mean): --  RR: 18 (14 May 2018 06:16) (18 - 18)  SpO2: 96% (14 May 2018 06:16) (95% - 99%)    acetaminophen   Tablet 650 milliGRAM(s) Oral every 6 hours PRN  acetaminophen   Tablet. 650 milliGRAM(s) Oral every 6 hours PRN  ascorbic acid 500 milliGRAM(s) Oral daily  aspirin enteric coated 81 milliGRAM(s) Oral daily  bisacodyl Suppository 10 milliGRAM(s) Rectal daily PRN  buDESOnide 160 MICROgram(s)/formoterol 4.5 MICROgram(s) Inhaler 2 Puff(s) Inhalation two times a day  calcium carbonate 1250 mG + Vitamin D (OsCal 500 + D) 1 Tablet(s) Oral three times a day  Dakins Solution - 1/4 Strength 1 Application(s) Topical three times a day  docusate sodium 100 milliGRAM(s) Oral three times a day  ferrous    sulfate 325 milliGRAM(s) Oral daily  folic acid 1 milliGRAM(s) Oral daily  HYDROmorphone   Tablet 2 milliGRAM(s) Oral every 4 hours PRN  HYDROmorphone  Injectable 0.5 milliGRAM(s) IV Push daily PRN  lidocaine 1%/EPINEPHrine 1:100,000 Inj 10 milliLiter(s) Local Injection once  lidocaine 2% Viscous 10 milliLiter(s) Swish and Spit every 6 hours PRN  melatonin 3 milliGRAM(s) Oral at bedtime  minocycline 100 milliGRAM(s) Oral two times a day  multivitamin 1 Tablet(s) Oral daily  nystatin    Suspension 337710 Unit(s) Oral every 6 hours  nystatin Powder 1 Application(s) Topical two times a day  pantoprazole    Tablet 40 milliGRAM(s) Oral before breakfast  polyethylene glycol 3350 17 Gram(s) Oral daily  povidone iodine 10% Solution 1 Application(s) Topical every 12 hours  senna 2 Tablet(s) Oral at bedtime PRN  tiotropium 18 MICROgram(s) Capsule 1 Capsule(s) Inhalation daily  warfarin 0.5 milliGRAM(s) Oral once  zinc oxide 20% Ointment 1 Application(s) Topical daily      PHYSICAL EXAM:  GENERAL: NAD,   EYES: conjunctiva and sclera clear  ENMT: Moist mucous membranes  NECK: Supple, No JVD, Normal thyroid  NERVOUS SYSTEM:  Alert & Oriented X3,   CHEST/LUNG: Clear to auscultation bilaterally; No rales, rhonchi, wheezing, or rubs  HEART: Regular rate and rhythm; No murmurs, rubs, or gallops  ABDOMEN: Soft, Nontender, Nondistended; Bowel sounds present  EXTREMITIES: abd wound dressing, rt thigh vac, rt knee wound dressing  LYMPH: No lymphadenopathy noted  SKIN: No rashes or lesions    Consultant(s) Notes Reviewed:  [x ] YES  [ ] NO  Care Discussed with Consultants/Other Providers [ x] YES  [ ] NO    LABS:                        8.1    10.41 )-----------( 281      ( 14 May 2018 07:39 )             26.1     05-14    140  |  99  |  10  ----------------------------<  111<H>  3.6   |  30  |  0.61    Ca    8.9      14 May 2018 06:50  Mg     1.4     05-14      PT/INR - ( 14 May 2018 09:47 )   PT: 30.6 sec;   INR: 2.65 ratio             CAPILLARY BLOOD GLUCOSE                RADIOLOGY & ADDITIONAL TESTS:    Imaging Personally Reviewed:  [ ] YES  [ ] NO

## 2018-05-14 NOTE — PROGRESS NOTE ADULT - ASSESSMENT
77F with complex PMH/PSH who recently had placement of a right knee antibiotic spacer followed by PRS closure c/b skin necrosis and formation of an eschar over the anterior knee joint. The patient was readmitted with fevers and right thigh cellulitis which was complicated by skin breakdown. Patient also developed wound at the right/mid lower quadrant of the abdomen.  >> Dressing changes TID: Saline moistened gauze to knee, saline moistened gauze to abdomen;  >> VAC change today  >> Medical optimization as per primary team.  >> DVT prophylaxis.  >> Protein supplementation of diet to encourage wound healing.   >> Discussed with Dr. Mg.     [facility] Plastic Surgery Pager -- [phone]  [facility] Plastic Surgery Pager -- [phone]

## 2018-05-15 LAB
ANION GAP SERPL CALC-SCNC: 10 MMOL/L — SIGNIFICANT CHANGE UP (ref 5–17)
BASOPHILS # BLD AUTO: 0.02 K/UL — SIGNIFICANT CHANGE UP (ref 0–0.2)
BASOPHILS NFR BLD AUTO: 0.2 % — SIGNIFICANT CHANGE UP (ref 0–2)
BUN SERPL-MCNC: 14 MG/DL — SIGNIFICANT CHANGE UP (ref 7–23)
CALCIUM SERPL-MCNC: 9.7 MG/DL — SIGNIFICANT CHANGE UP (ref 8.4–10.5)
CHLORIDE SERPL-SCNC: 99 MMOL/L — SIGNIFICANT CHANGE UP (ref 96–108)
CO2 SERPL-SCNC: 30 MMOL/L — SIGNIFICANT CHANGE UP (ref 22–31)
CREAT SERPL-MCNC: 0.61 MG/DL — SIGNIFICANT CHANGE UP (ref 0.5–1.3)
EOSINOPHIL # BLD AUTO: 0.42 K/UL — SIGNIFICANT CHANGE UP (ref 0–0.5)
EOSINOPHIL NFR BLD AUTO: 4.4 % — SIGNIFICANT CHANGE UP (ref 0–6)
GLUCOSE SERPL-MCNC: 106 MG/DL — HIGH (ref 70–99)
HCT VFR BLD CALC: 27.7 % — LOW (ref 34.5–45)
HGB BLD-MCNC: 8.5 G/DL — LOW (ref 11.5–15.5)
IMM GRANULOCYTES NFR BLD AUTO: 0.4 % — SIGNIFICANT CHANGE UP (ref 0–1.5)
INR BLD: 2.81 RATIO — HIGH (ref 0.88–1.16)
LYMPHOCYTES # BLD AUTO: 2.39 K/UL — SIGNIFICANT CHANGE UP (ref 1–3.3)
LYMPHOCYTES # BLD AUTO: 25.3 % — SIGNIFICANT CHANGE UP (ref 13–44)
MCHC RBC-ENTMCNC: 27.2 PG — SIGNIFICANT CHANGE UP (ref 27–34)
MCHC RBC-ENTMCNC: 30.7 GM/DL — LOW (ref 32–36)
MCV RBC AUTO: 88.5 FL — SIGNIFICANT CHANGE UP (ref 80–100)
MONOCYTES # BLD AUTO: 0.62 K/UL — SIGNIFICANT CHANGE UP (ref 0–0.9)
MONOCYTES NFR BLD AUTO: 6.6 % — SIGNIFICANT CHANGE UP (ref 2–14)
NEUTROPHILS # BLD AUTO: 5.95 K/UL — SIGNIFICANT CHANGE UP (ref 1.8–7.4)
NEUTROPHILS NFR BLD AUTO: 63.1 % — SIGNIFICANT CHANGE UP (ref 43–77)
PLATELET # BLD AUTO: 291 K/UL — SIGNIFICANT CHANGE UP (ref 150–400)
POTASSIUM SERPL-MCNC: 3.7 MMOL/L — SIGNIFICANT CHANGE UP (ref 3.5–5.3)
POTASSIUM SERPL-SCNC: 3.7 MMOL/L — SIGNIFICANT CHANGE UP (ref 3.5–5.3)
PROTHROM AB SERPL-ACNC: 32.4 SEC — HIGH (ref 10–13.1)
RBC # BLD: 3.13 M/UL — LOW (ref 3.8–5.2)
RBC # FLD: 17.4 % — HIGH (ref 10.3–14.5)
SODIUM SERPL-SCNC: 139 MMOL/L — SIGNIFICANT CHANGE UP (ref 135–145)
WBC # BLD: 9.44 K/UL — SIGNIFICANT CHANGE UP (ref 3.8–10.5)
WBC # FLD AUTO: 9.44 K/UL — SIGNIFICANT CHANGE UP (ref 3.8–10.5)

## 2018-05-15 PROCEDURE — 99232 SBSQ HOSP IP/OBS MODERATE 35: CPT

## 2018-05-15 RX ORDER — ACETAMINOPHEN 500 MG
650 TABLET ORAL EVERY 8 HOURS
Qty: 0 | Refills: 0 | Status: DISCONTINUED | OUTPATIENT
Start: 2018-05-15 | End: 2018-05-17

## 2018-05-15 RX ORDER — ACETAMINOPHEN 500 MG
650 TABLET ORAL ONCE
Qty: 0 | Refills: 0 | Status: CANCELLED | OUTPATIENT
Start: 2019-04-14 | End: 2018-06-06

## 2018-05-15 RX ORDER — CLOTRIMAZOLE AND BETAMETHASONE DIPROPIONATE 10; .5 MG/G; MG/G
1 CREAM TOPICAL
Qty: 0 | Refills: 0 | Status: DISCONTINUED | OUTPATIENT
Start: 2018-05-15 | End: 2018-06-06

## 2018-05-15 RX ORDER — WARFARIN SODIUM 2.5 MG/1
0.5 TABLET ORAL ONCE
Qty: 0 | Refills: 0 | Status: COMPLETED | OUTPATIENT
Start: 2018-05-15 | End: 2018-05-15

## 2018-05-15 RX ADMIN — CLOTRIMAZOLE AND BETAMETHASONE DIPROPIONATE 1 APPLICATION(S): 10; .5 CREAM TOPICAL at 17:46

## 2018-05-15 RX ADMIN — Medication 3 MILLIGRAM(S): at 21:14

## 2018-05-15 RX ADMIN — Medication 650 MILLIGRAM(S): at 21:13

## 2018-05-15 RX ADMIN — HYDROMORPHONE HYDROCHLORIDE 2 MILLIGRAM(S): 2 INJECTION INTRAMUSCULAR; INTRAVENOUS; SUBCUTANEOUS at 11:40

## 2018-05-15 RX ADMIN — Medication 1 MILLIGRAM(S): at 11:18

## 2018-05-15 RX ADMIN — MINOCYCLINE HYDROCHLORIDE 100 MILLIGRAM(S): 45 TABLET, EXTENDED RELEASE ORAL at 17:45

## 2018-05-15 RX ADMIN — HYDROMORPHONE HYDROCHLORIDE 2 MILLIGRAM(S): 2 INJECTION INTRAMUSCULAR; INTRAVENOUS; SUBCUTANEOUS at 05:42

## 2018-05-15 RX ADMIN — Medication 650 MILLIGRAM(S): at 08:22

## 2018-05-15 RX ADMIN — Medication 1 TABLET(S): at 11:18

## 2018-05-15 RX ADMIN — BUDESONIDE AND FORMOTEROL FUMARATE DIHYDRATE 2 PUFF(S): 160; 4.5 AEROSOL RESPIRATORY (INHALATION) at 05:12

## 2018-05-15 RX ADMIN — HYDROMORPHONE HYDROCHLORIDE 2 MILLIGRAM(S): 2 INJECTION INTRAMUSCULAR; INTRAVENOUS; SUBCUTANEOUS at 18:27

## 2018-05-15 RX ADMIN — HYDROMORPHONE HYDROCHLORIDE 2 MILLIGRAM(S): 2 INJECTION INTRAMUSCULAR; INTRAVENOUS; SUBCUTANEOUS at 00:50

## 2018-05-15 RX ADMIN — POLYETHYLENE GLYCOL 3350 17 GRAM(S): 17 POWDER, FOR SOLUTION ORAL at 11:18

## 2018-05-15 RX ADMIN — NYSTATIN CREAM 1 APPLICATION(S): 100000 CREAM TOPICAL at 12:09

## 2018-05-15 RX ADMIN — HYDROMORPHONE HYDROCHLORIDE 2 MILLIGRAM(S): 2 INJECTION INTRAMUSCULAR; INTRAVENOUS; SUBCUTANEOUS at 11:14

## 2018-05-15 RX ADMIN — Medication 650 MILLIGRAM(S): at 21:43

## 2018-05-15 RX ADMIN — Medication 100 MILLIGRAM(S): at 05:12

## 2018-05-15 RX ADMIN — HYDROMORPHONE HYDROCHLORIDE 0.5 MILLIGRAM(S): 2 INJECTION INTRAMUSCULAR; INTRAVENOUS; SUBCUTANEOUS at 12:06

## 2018-05-15 RX ADMIN — Medication 500 MILLIGRAM(S): at 11:18

## 2018-05-15 RX ADMIN — Medication 500000 UNIT(S): at 17:45

## 2018-05-15 RX ADMIN — TIOTROPIUM BROMIDE 1 CAPSULE(S): 18 CAPSULE ORAL; RESPIRATORY (INHALATION) at 11:19

## 2018-05-15 RX ADMIN — Medication 325 MILLIGRAM(S): at 11:18

## 2018-05-15 RX ADMIN — PANTOPRAZOLE SODIUM 40 MILLIGRAM(S): 20 TABLET, DELAYED RELEASE ORAL at 05:12

## 2018-05-15 RX ADMIN — Medication 500000 UNIT(S): at 11:18

## 2018-05-15 RX ADMIN — HYDROMORPHONE HYDROCHLORIDE 2 MILLIGRAM(S): 2 INJECTION INTRAMUSCULAR; INTRAVENOUS; SUBCUTANEOUS at 19:00

## 2018-05-15 RX ADMIN — MINOCYCLINE HYDROCHLORIDE 100 MILLIGRAM(S): 45 TABLET, EXTENDED RELEASE ORAL at 05:12

## 2018-05-15 RX ADMIN — Medication 1 APPLICATION(S): at 05:23

## 2018-05-15 RX ADMIN — Medication 81 MILLIGRAM(S): at 11:18

## 2018-05-15 RX ADMIN — Medication 500000 UNIT(S): at 05:12

## 2018-05-15 RX ADMIN — Medication 1 APPLICATION(S): at 17:47

## 2018-05-15 RX ADMIN — WARFARIN SODIUM 0.5 MILLIGRAM(S): 2.5 TABLET ORAL at 21:14

## 2018-05-15 RX ADMIN — Medication 650 MILLIGRAM(S): at 08:50

## 2018-05-15 RX ADMIN — ZINC OXIDE 1 APPLICATION(S): 200 OINTMENT TOPICAL at 11:20

## 2018-05-15 RX ADMIN — HYDROMORPHONE HYDROCHLORIDE 2 MILLIGRAM(S): 2 INJECTION INTRAMUSCULAR; INTRAVENOUS; SUBCUTANEOUS at 01:25

## 2018-05-15 RX ADMIN — HYDROMORPHONE HYDROCHLORIDE 0.5 MILLIGRAM(S): 2 INJECTION INTRAMUSCULAR; INTRAVENOUS; SUBCUTANEOUS at 12:25

## 2018-05-15 RX ADMIN — BUDESONIDE AND FORMOTEROL FUMARATE DIHYDRATE 2 PUFF(S): 160; 4.5 AEROSOL RESPIRATORY (INHALATION) at 17:45

## 2018-05-15 RX ADMIN — Medication 100 MILLIGRAM(S): at 21:14

## 2018-05-15 RX ADMIN — HYDROMORPHONE HYDROCHLORIDE 2 MILLIGRAM(S): 2 INJECTION INTRAMUSCULAR; INTRAVENOUS; SUBCUTANEOUS at 05:12

## 2018-05-15 NOTE — PROGRESS NOTE ADULT - SUBJECTIVE AND OBJECTIVE BOX
Pt is a 77 year old female who was admitted several weeks ago with aspiration pneumonia and fever with right knee pain. She also had proximal thigh swelling and there was a hematoma of the leg as well. She has a history of PMR on steroids. She also has a history of tavr, AICD andf remote bilateral TKR and was found to have strep bacteremia and a ERIKA was negative and she also had a eschar of the right knee as well. She was placed on iv antibiotics and I understand that she will be completing the antibiotics, daptomycin on this date. She told me that she had anemia in the distant past and was placed on antibiotics. She is being seen by the plastics service as well. A ct of th femur was not remarkable for infection and a ct of the chest done several weeks ago showed bibasilar PNA. She was also noted to have a left renal lesion that is unchanged now since 12/2016.     The consult was requested to see if there is anything we can do for her anemia. The counts on the day of this admission was white cell count of 9.71 hemoglobin 8.4 hematocrit 26.4 MCV 86 and MCHC 31.8 and platelet count of 440848 the diff count was 74 polys 15 lymphs and 7 monos the bun was 9 and the creatinine was 0.6     5/4 the pt is stable and the iron studies are still pending at this time. The hemoglobin was 8.4. 5/7 the pt was seen and the notes over charu last few days. The pt will in the am have ferritin and iron studies sent off. 5/8 the ferritin came back as over 300 and therefore she is not iron deficient and the anemia is consistent with chronic disease. The use of epogen might be considered here if hemoglobin drops and can be implemented to reduce blood transfusions. 5/9 pt clearly looks better and repeat hemoglobin was stable at 8.1  PE weak appearing with female and needed help to do normal daily activities large eschar as described on the lower leg and at the time of my arrival, the wound vac was in place and she was being cared for by the staff.  5/8 the ferritin came back at over 300 and therefore she is not iron deficient and epo can be considered if the hemoglobin drops and is in need of blood support. 5/10 pt being evaluated for rehab and consider epo of hemoglobin drops below the 8 range. 5/11 notes reviewed and will order cbc on the weekend and decide on epo.  pmh as above in the body of the report  5/14 the hemoglobin remains at 8.1 and debridement and wound vac and abdominal wound and thigh wounds were being cared for. 5/15 stable clinically and looks better the hemoglobin without epo is up to 8.5                        8.5    9.44  )-----------( 291      ( 15 May 2018 07:37 )             27.7      PE pt being cared for by the staff and she appeared to be stable.  MEDICATIONS  (STANDING):  ascorbic acid 500 milliGRAM(s) Oral daily  aspirin enteric coated 81 milliGRAM(s) Oral daily  buDESOnide 160 MICROgram(s)/formoterol 4.5 MICROgram(s) Inhaler 2 Puff(s) Inhalation two times a day  Dakins Solution - 1/4 Strength 1 Application(s) Topical three times a day  docusate sodium 100 milliGRAM(s) Oral three times a day  ferrous    sulfate 325 milliGRAM(s) Oral daily  folic acid 1 milliGRAM(s) Oral daily  magnesium sulfate  IVPB 2 Gram(s) IV Intermittent once  melatonin 3 milliGRAM(s) Oral at bedtime  minocycline 100 milliGRAM(s) Oral two times a day  multivitamin 1 Tablet(s) Oral daily  nystatin    Suspension 388313 Unit(s) Oral every 6 hours  nystatin Powder 1 Application(s) Topical two times a day  pantoprazole    Tablet 40 milliGRAM(s) Oral before breakfast  polyethylene glycol 3350 17 Gram(s) Oral daily  povidone iodine 10% Solution 1 Application(s) Topical every 12 hours  tiotropium 18 MICROgram(s) Capsule 1 Capsule(s) Inhalation daily  warfarin 0.5 milliGRAM(s) Oral once  zinc oxide 20% Ointment 1 Application(s) Topical daily  Vital Signs Last 24 Hrs  T(C): 36.8 (14 May 2018 14:06), Max: 36.9 (14 May 2018 06:16)  T(F): 98.3 (14 May 2018 14:06), Max: 98.5 (14 May 2018 06:16)  HR: 78 (14 May 2018 14:06) (78 - 100)  BP: 148/81 (14 May 2018 14:06) (123/63 - 148/81)  BP(mean): --  RR: 18 (14 May 2018 14:06) (18 - 18)  SpO2: 93% (14 May 2018 14:06) (93% - 99%)

## 2018-05-15 NOTE — PROGRESS NOTE ADULT - SUBJECTIVE AND OBJECTIVE BOX
Patient is a 77y old  Female who presents with a chief complaint of fever (11 Apr 2018 14:25)      INTERVAL HPI/OVERNIGHT EVENTS: c/o pain at the wound sites      Vital Signs Last 24 Hrs  T(C): 36.5 (15 May 2018 05:08), Max: 36.8 (14 May 2018 14:06)  T(F): 97.7 (15 May 2018 05:08), Max: 98.3 (14 May 2018 14:06)  HR: 98 (15 May 2018 05:08) (78 - 98)  BP: 98/62 (15 May 2018 05:08) (98/62 - 148/81)  BP(mean): --  RR: 18 (15 May 2018 05:08) (18 - 18)  SpO2: 95% (15 May 2018 05:08) (93% - 99%)    acetaminophen   Tablet 650 milliGRAM(s) Oral every 6 hours PRN  acetaminophen   Tablet. 650 milliGRAM(s) Oral every 6 hours PRN  ascorbic acid 500 milliGRAM(s) Oral daily  aspirin enteric coated 81 milliGRAM(s) Oral daily  bisacodyl Suppository 10 milliGRAM(s) Rectal daily PRN  buDESOnide 160 MICROgram(s)/formoterol 4.5 MICROgram(s) Inhaler 2 Puff(s) Inhalation two times a day  clotrimazole/betamethasone Cream 1 Application(s) Topical two times a day  docusate sodium 100 milliGRAM(s) Oral three times a day  ferrous    sulfate 325 milliGRAM(s) Oral daily  folic acid 1 milliGRAM(s) Oral daily  HYDROmorphone   Tablet 2 milliGRAM(s) Oral every 4 hours PRN  HYDROmorphone  Injectable 0.5 milliGRAM(s) IV Push daily PRN  lidocaine 2% Gel 1 Application(s) Topical daily PRN  lidocaine 2% Viscous 10 milliLiter(s) Swish and Spit every 6 hours PRN  melatonin 3 milliGRAM(s) Oral at bedtime  minocycline 100 milliGRAM(s) Oral two times a day  multivitamin 1 Tablet(s) Oral daily  nystatin    Suspension 979830 Unit(s) Oral every 6 hours  nystatin Powder 1 Application(s) Topical two times a day  pantoprazole    Tablet 40 milliGRAM(s) Oral before breakfast  polyethylene glycol 3350 17 Gram(s) Oral daily  povidone iodine 10% Solution 1 Application(s) Topical every 12 hours  senna 2 Tablet(s) Oral at bedtime PRN  tiotropium 18 MICROgram(s) Capsule 1 Capsule(s) Inhalation daily  zinc oxide 20% Ointment 1 Application(s) Topical daily      PHYSICAL EXAM:  GENERAL: NAD,   EYES: conjunctiva and sclera clear  ENMT: Moist mucous membranes  NECK: Supple, No JVD, Normal thyroid  NERVOUS SYSTEM:  Alert & Oriented X3,   CHEST/LUNG: Clear to auscultation bilaterally; No rales, rhonchi, wheezing, or rubs  HEART: Regular rate and rhythm; No murmurs, rubs, or gallops  ABDOMEN: Soft, Nontender, Nondistended; Bowel sounds present  EXTREMITIES:  rt knee eschar, rt thigh wound vac, abd wound vac  LYMPH: No lymphadenopathy noted  SKIN: No rashes or lesions    Consultant(s) Notes Reviewed:  [x ] YES  [ ] NO  Care Discussed with Consultants/Other Providers [ x] YES  [ ] NO    LABS:                        8.5    9.44  )-----------( 291      ( 15 May 2018 07:37 )             27.7     05-15    139  |  99  |  14  ----------------------------<  106<H>  3.7   |  30  |  0.61    Ca    9.7      15 May 2018 07:07  Mg     1.4     05-14      PT/INR - ( 15 May 2018 09:36 )   PT: 32.4 sec;   INR: 2.81 ratio             CAPILLARY BLOOD GLUCOSE                RADIOLOGY & ADDITIONAL TESTS:    Imaging Personally Reviewed:  [ ] YES  [ ] NO

## 2018-05-15 NOTE — PROGRESS NOTE ADULT - SUBJECTIVE AND OBJECTIVE BOX
CHIEF COMPLAINT:    Interval Events:    REVIEW OF SYSTEMS:  Constitutional: No fevers or chills. No weight loss. No fatigue or generalized malaise.  Eyes: No itching or discharge from the eyes  ENT: No ear pain. No ear discharge. No nasal congestion. No post nasal drip. No epistaxis. No throat pain. No sore throat. No difficulty swallowing.   CV: No chest pain. No palpitations. No lightheadedness or dizziness.   Resp: No dyspnea at rest. No dyspnea on exertion. No orthopnea. No wheezing. No cough. No stridor. No sputum production. No chest pain with respiration.  GI: No nausea. No vomiting. No diarrhea.  MSK: No joint pain or pain in any extremities  Integumentary: No skin lesions. No pedal edema.  Neurological: No gross motor weakness. No sensory changes.  [ ] All other systems negative  [ ] Unable to assess ROS because ________    OBJECTIVE:  ICU Vital Signs Last 24 Hrs  T(C): 36.5 (15 May 2018 05:08), Max: 36.9 (14 May 2018 06:16)  T(F): 97.7 (15 May 2018 05:08), Max: 98.5 (14 May 2018 06:16)  HR: 98 (15 May 2018 05:08) (78 - 100)  BP: 132/74 (14 May 2018 19:51) (123/63 - 148/81)  BP(mean): --  ABP: --  ABP(mean): --  RR: 18 (15 May 2018 05:08) (18 - 18)  SpO2: 95% (15 May 2018 05:08) (93% - 99%)        05-13 @ 07:01  -  05-14 @ 07:00  --------------------------------------------------------  IN: 1100 mL / OUT: 235 mL / NET: 865 mL    05-14 @ 07:01  -  05-15 @ 05:14  --------------------------------------------------------  IN: 600 mL / OUT: 250 mL / NET: 350 mL      CAPILLARY BLOOD GLUCOSE          PHYSICAL EXAM:  General: Awake, alert, oriented X 3.   HEENT: Atraumatic, normocephalic.                 Mallampatti Grade                 No nasal congestion.                No tonsillar or pharyngeal exudates.  Lymph Nodes: No palpable lymphadenopathy  Neck: No JVD. No carotid bruit.   Respiratory: Normal chest expansion                         Normal percussion                         Normal and equal air entry                         No wheeze, rhonchi or rales.  Cardiovascular: S1 S2 normal. No murmurs, rubs or gallops.   Abdomen: Soft, non-tender, non-distended. No organomegaly.  Extremities: Warm to touch. Peripheral pulse palpable. No pedal edema.   Skin: No rashes or skin lesions  Neurological: Motor and sensory examination equal and normal in all four extremities.  Psychiatry: Appropriate mood and affect.    HOSPITAL MEDICATIONS:  MEDICATIONS  (STANDING):  ascorbic acid 500 milliGRAM(s) Oral daily  aspirin enteric coated 81 milliGRAM(s) Oral daily  buDESOnide 160 MICROgram(s)/formoterol 4.5 MICROgram(s) Inhaler 2 Puff(s) Inhalation two times a day  Dakins Solution - 1/4 Strength 1 Application(s) Topical three times a day  docusate sodium 100 milliGRAM(s) Oral three times a day  ferrous    sulfate 325 milliGRAM(s) Oral daily  folic acid 1 milliGRAM(s) Oral daily  melatonin 3 milliGRAM(s) Oral at bedtime  minocycline 100 milliGRAM(s) Oral two times a day  multivitamin 1 Tablet(s) Oral daily  nystatin    Suspension 558811 Unit(s) Oral every 6 hours  nystatin Powder 1 Application(s) Topical two times a day  pantoprazole    Tablet 40 milliGRAM(s) Oral before breakfast  polyethylene glycol 3350 17 Gram(s) Oral daily  povidone iodine 10% Solution 1 Application(s) Topical every 12 hours  tiotropium 18 MICROgram(s) Capsule 1 Capsule(s) Inhalation daily  zinc oxide 20% Ointment 1 Application(s) Topical daily    MEDICATIONS  (PRN):  acetaminophen   Tablet 650 milliGRAM(s) Oral every 6 hours PRN For Temp greater than 38 C (100.4 F)  acetaminophen   Tablet. 650 milliGRAM(s) Oral every 6 hours PRN Mild Pain (1 - 3)  bisacodyl Suppository 10 milliGRAM(s) Rectal daily PRN Constipation  HYDROmorphone   Tablet 2 milliGRAM(s) Oral every 4 hours PRN Severe Pain (7 - 10)  HYDROmorphone  Injectable 0.5 milliGRAM(s) IV Push daily PRN Dressing change  lidocaine 2% Gel 1 Application(s) Topical daily PRN dressing change  lidocaine 2% Viscous 10 milliLiter(s) Swish and Spit every 6 hours PRN Mouth Sores  senna 2 Tablet(s) Oral at bedtime PRN Constipation      LABS:                        8.1    10.41 )-----------( 281      ( 14 May 2018 07:39 )             26.1     05-14    140  |  99  |  10  ----------------------------<  111<H>  3.6   |  30  |  0.61    Ca    8.9      14 May 2018 06:50  Mg     1.4     05-14      PT/INR - ( 14 May 2018 09:47 )   PT: 30.6 sec;   INR: 2.65 ratio                   MICROBIOLOGY:     RADIOLOGY:  [ ] Reviewed and interpreted by me    Point of Care Ultrasound Findings:    PFT:    EKG: CHIEF COMPLAINT: upset over all--pain all over--no signif sob or cough-concern over adjacent leg wound    Interval Events: adjacent leg wound    REVIEW OF SYSTEMS:  Constitutional: No fevers or chills. No weight loss. + fatigue or generalized malaise.  Eyes: No itching or discharge from the eyes  ENT: No ear pain. No ear discharge. No nasal congestion. No post nasal drip. No epistaxis. No throat pain. No sore throat. No difficulty swallowing.   CV: No chest pain. No palpitations. No lightheadedness or dizziness.   Resp: No dyspnea at rest. + dyspnea on exertion. No orthopnea. No wheezing. No cough. No stridor. No sputum production. No chest pain with respiration.  GI: No nausea. No vomiting. No diarrhea.  MSK: No joint pain or pain in any extremities--wound pain right leg  Integumentary: No skin lesions. No pedal edema.  Neurological: No gross motor weakness. No sensory changes.  [+ ] All other systems negative  [ ] Unable to assess ROS because ________    OBJECTIVE:  ICU Vital Signs Last 24 Hrs  T(C): 36.5 (15 May 2018 05:08), Max: 36.9 (14 May 2018 06:16)  T(F): 97.7 (15 May 2018 05:08), Max: 98.5 (14 May 2018 06:16)  HR: 98 (15 May 2018 05:08) (78 - 100)  BP: 132/74 (14 May 2018 19:51) (123/63 - 148/81)  BP(mean): --  ABP: --  ABP(mean): --  RR: 18 (15 May 2018 05:08) (18 - 18)  SpO2: 95% (15 May 2018 05:08) (93% - 99%)        05-13 @ 07:01  -  05-14 @ 07:00  --------------------------------------------------------  IN: 1100 mL / OUT: 235 mL / NET: 865 mL    05-14 @ 07:01  -  05-15 @ 05:14  --------------------------------------------------------  IN: 600 mL / OUT: 250 mL / NET: 350 mL      CAPILLARY BLOOD GLUCOSE          PHYSICAL EXAM: NAD in bed on NC O2  General: Awake, alert, oriented X 3.   HEENT: Atraumatic, normocephalic.                 Mallampatti Grade 3                No nasal congestion.                No tonsillar or pharyngeal exudates.  Lymph Nodes: No palpable lymphadenopathy  Neck: No JVD. No carotid bruit.   Respiratory: abnormal chest expansion-reduced BS bases                         Normal percussion                         Normal and equal air entry                         No wheeze, rhonchi or rales.  Cardiovascular: S1 S2 normal. + murmurs, rubs or gallops.   Abdomen: Soft, non-tender, non-distended. No organomegaly.  Extremities: Warm to touch. Peripheral pulse palpable. No pedal edema. Right leg VAC/left upper thigh-wound  Skin: No rashes or skin lesions-except upper legs  Neurological: Motor and sensory examination equal and normal in all four extremities.  Psychiatry: Appropriate mood and affect.    HOSPITAL MEDICATIONS:  MEDICATIONS  (STANDING):  ascorbic acid 500 milliGRAM(s) Oral daily  aspirin enteric coated 81 milliGRAM(s) Oral daily  buDESOnide 160 MICROgram(s)/formoterol 4.5 MICROgram(s) Inhaler 2 Puff(s) Inhalation two times a day  Dakins Solution - 1/4 Strength 1 Application(s) Topical three times a day  docusate sodium 100 milliGRAM(s) Oral three times a day  ferrous    sulfate 325 milliGRAM(s) Oral daily  folic acid 1 milliGRAM(s) Oral daily  melatonin 3 milliGRAM(s) Oral at bedtime  minocycline 100 milliGRAM(s) Oral two times a day  multivitamin 1 Tablet(s) Oral daily  nystatin    Suspension 257649 Unit(s) Oral every 6 hours  nystatin Powder 1 Application(s) Topical two times a day  pantoprazole    Tablet 40 milliGRAM(s) Oral before breakfast  polyethylene glycol 3350 17 Gram(s) Oral daily  povidone iodine 10% Solution 1 Application(s) Topical every 12 hours  tiotropium 18 MICROgram(s) Capsule 1 Capsule(s) Inhalation daily  zinc oxide 20% Ointment 1 Application(s) Topical daily    MEDICATIONS  (PRN):  acetaminophen   Tablet 650 milliGRAM(s) Oral every 6 hours PRN For Temp greater than 38 C (100.4 F)  acetaminophen   Tablet. 650 milliGRAM(s) Oral every 6 hours PRN Mild Pain (1 - 3)  bisacodyl Suppository 10 milliGRAM(s) Rectal daily PRN Constipation  HYDROmorphone   Tablet 2 milliGRAM(s) Oral every 4 hours PRN Severe Pain (7 - 10)  HYDROmorphone  Injectable 0.5 milliGRAM(s) IV Push daily PRN Dressing change  lidocaine 2% Gel 1 Application(s) Topical daily PRN dressing change  lidocaine 2% Viscous 10 milliLiter(s) Swish and Spit every 6 hours PRN Mouth Sores  senna 2 Tablet(s) Oral at bedtime PRN Constipation      LABS:                        8.1    10.41 )-----------( 281      ( 14 May 2018 07:39 )             26.1     05-14    140  |  99  |  10  ----------------------------<  111<H>  3.6   |  30  |  0.61    Ca    8.9      14 May 2018 06:50  Mg     1.4     05-14      PT/INR - ( 14 May 2018 09:47 )   PT: 30.6 sec;   INR: 2.65 ratio                   MICROBIOLOGY:     RADIOLOGY:  [ ] Reviewed and interpreted by me    Point of Care Ultrasound Findings:    PFT:    EKG:

## 2018-05-15 NOTE — PROGRESS NOTE ADULT - SUBJECTIVE AND OBJECTIVE BOX
pain controlled, afebrile  vac now applied to both abd and thigh wounds  in good spirits today, she is hoping to start to transition to wheelchair with PT    abdominal wound - vac in place  RLE  thigh vac in place  surgical wound intact with stable eschar, some separation of eschar medially from skin, no exudate, dressed by prs/wound care  5/5 ta/ehl/gcs  silt l4-s1  2+ dp pulse

## 2018-05-15 NOTE — PROGRESS NOTE ADULT - ASSESSMENT
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     # Recent septic Rt TKR - abx spacer exchanged and plastics complex wound closure  # PMR on chronic steroids  # thigh wound   # right knee eschar, right thigh necrosis and abd wound   # Anemia    Plan:  Plastics and otho on the case. f/u appreciated.  cont local wound care as family declining surgery  s/p multiple debridements, cont wet to dry dressing abd and knee wounds TID  wound vac for thigh wound and abd wound  remains afebrile, cont suppressive minocycline, appreciate ID f/u.   appreciate psych eval  heme follow up  cont coumadin and asa  pain control start tylenol q8 atc, dialuadid 0.5 iv before dressing change and dilaudid 2mg q4 prn-   encouraged pt to ask for meds if severe pain  PT OOB encourage ambulation, dw patient importance of turning and trying to get out of bed  bowel regimen

## 2018-05-15 NOTE — PROGRESS NOTE ADULT - PROBLEM SELECTOR PLAN 4
multifactorial-leg and lung--on ABX as per ID (daptomycin-completed 5/5)--next minocycline  if re-spikes will need to reconsider other sources of infection-?endocarditis-if fevers persist/recurs

## 2018-05-15 NOTE — PROGRESS NOTE ADULT - PROBLEM SELECTOR PLAN 9
abnormal CT--c/w aspiration--no longer an issue  Sp and Sw evaluation--aspiration precautions--ABX as per ID-total of 7 d overlap-completed -s/p daptomycin as of 5/5--now minocycline

## 2018-05-15 NOTE — PROGRESS NOTE ADULT - ASSESSMENT
no plan for orthopedic surgical intervention per family and patient preference  local wound care to knee and vac therapy to abd/thigh per plastics and wound care teams  PO abx per ID team  encourage patient to spend majority of bed oob in bedside chair as able, has not been OOB in some time, she refused yesterday to get oob with pt  PT to help mobilize, she must remain 50% wb on the RLE and NO KNEE MOTION allowed, knee immobilizer if oob  coumadin, inr goal 2-3  continue to optimize nutrition  continue to involve psych, remains emotional at times, in good spirits today	  dispo planning when deemed optimized per prs/wound care    *please order portable xr R knee to evaluate orthopedic hardware (AP and lateral)    Nate Bass MD

## 2018-05-15 NOTE — PROGRESS NOTE ADULT - ATTENDING COMMENTS
as above-  Pulm relatively stable-atelectasis, prior PE, asthma, cardiac Dz  Inhaler regimen as outlined above  DVT rx /prophylaxis  ID follow up of ABX  completed 5/5--then minocycline  (debridement done 5/5)--following WBC   PT evaluation and Psych evaluation    ortho/plastics follow up-knee immobilizer/PT--advance ambulation as able  Rehab pending-pulm. cleared for DC to rehab.    Everett Kumar MD-Pulmonary   [phone] as above- Wound care and nutrition appear to be primary issues --consequent Psych breakdown.  Pulm relatively stable-atelectasis, prior PE, asthma, cardiac Dz  Inhaler regimen as outlined above  DVT rx /prophylaxis  ID follow up of ABX  completed 5/5--then minocycline  (debridement done 5/5)--following WBC   PT evaluation and Psych evaluation    ortho/plastics follow up-knee immobilizer/PT--advance ambulation as able; Nutrition follow up  Rehab pending-pulm. cleared for DC to rehab.    Everett Kumar MD-Pulmonary   [phone]

## 2018-05-15 NOTE — PROGRESS NOTE ADULT - ASSESSMENT
5/14 the hemoglobin remains at 8.1 and debridement and wound vac and abdominal wound and thigh wounds were being cared for.      5/15 stable clinically and looks better the hemoglobin without epo is up to 8.5

## 2018-05-15 NOTE — PROGRESS NOTE ADULT - ASSESSMENT
A: 77F with complex PMH/PSH who recently had placement of a right knee antibiotic spacer followed by PRS closure c/b skin necrosis and formation of an eschar over the anterior knee joint. The patient was readmitted with fevers and right thigh cellulitis which was complicated by skin breakdown. Patient also developed wound at the right/mid lower quadrant of the abdomen.    P:  >> Dressing change TID: Saline moistened gauze to knee  >> Betadine to eschar bid  >> VACs on abdomen and R thigh to be changed tomorrow  >> Medical optimization as per primary team.  >> DVT prophylaxis.  >> Protein supplementation of diet to encourage wound healing.   >> Discussed with Dr. Mg.     [facility] Plastic Surgery Pager -- [phone]  [facility] Plastic Surgery Pager -- [phone]

## 2018-05-15 NOTE — PROGRESS NOTE ADULT - SUBJECTIVE AND OBJECTIVE BOX
Ortho Progress Note    S: Patient seen and examined. No acute events overnight. Pain well controlled with current regimen.       O:  Physical Exam:  Gen: Laying in bed, NAD, alert and oriented.   Resp: Unlabored breathing  LLE: EHL/FHL/TA/Sol intact          + SILT DP/SP/GRIFFITH/Sa          +DP, extremity WWP         thigh & abd wound vac in place. large eschar over anteriomedial knee    Vital Signs Last 24 Hrs  T(C): 36.5 (15 May 2018 05:08), Max: 36.8 (14 May 2018 14:06)  T(F): 97.7 (15 May 2018 05:08), Max: 98.3 (14 May 2018 14:06)  HR: 98 (15 May 2018 05:08) (78 - 98)  BP: 98/62 (15 May 2018 05:08) (98/62 - 148/81)  BP(mean): --  RR: 18 (15 May 2018 05:08) (18 - 18)  SpO2: 95% (15 May 2018 05:08) (93% - 99%)                          8.5    9.44  )-----------( 291      ( 15 May 2018 07:37 )             27.7                         8.1    10.41 )-----------( 281      ( 14 May 2018 07:39 )             26.1       05-15    139  |  99  |  14  ----------------------------<  106<H>  3.7   |  30  |  0.61        PT/INR - ( 15 May 2018 09:36 )   PT: 32.4 sec;   INR: 2.81 ratio

## 2018-05-15 NOTE — PROGRESS NOTE ADULT - SUBJECTIVE AND OBJECTIVE BOX
PLASTIC SURGERY DAILY PROGRESS NOTE:    SUBJECTIVE:  Patient seen and examined. No acute events overnight. A vac was applied to her abdominal wound yesterday and her R thigh vac was changed.    OBJECTIVE:   Vital Signs Last 24 Hrs  T(C): 36.5 (15 May 2018 05:08), Max: 36.8 (14 May 2018 14:06)  T(F): 97.7 (15 May 2018 05:08), Max: 98.3 (14 May 2018 14:06)  HR: 98 (15 May 2018 05:08) (78 - 98)  BP: 98/62 (15 May 2018 05:08) (98/62 - 148/81)  BP(mean): --  RR: 18 (15 May 2018 05:08) (18 - 18)  SpO2: 95% (15 May 2018 05:08) (93% - 99%)    I&O's Detail    14 May 2018 07:01  -  15 May 2018 07:00  --------------------------------------------------------  IN:    Oral Fluid: 840 mL  Total IN: 840 mL    OUT:    Voided: 250 mL  Total OUT: 250 mL    Total NET: 590 mL    MEDICATIONS  (STANDING):  ascorbic acid 500 milliGRAM(s) Oral daily  aspirin enteric coated 81 milliGRAM(s) Oral daily  buDESOnide 160 MICROgram(s)/formoterol 4.5 MICROgram(s) Inhaler 2 Puff(s) Inhalation two times a day  docusate sodium 100 milliGRAM(s) Oral three times a day  ferrous    sulfate 325 milliGRAM(s) Oral daily  folic acid 1 milliGRAM(s) Oral daily  melatonin 3 milliGRAM(s) Oral at bedtime  minocycline 100 milliGRAM(s) Oral two times a day  multivitamin 1 Tablet(s) Oral daily  nystatin    Suspension 903790 Unit(s) Oral every 6 hours  nystatin Powder 1 Application(s) Topical two times a day  pantoprazole    Tablet 40 milliGRAM(s) Oral before breakfast  polyethylene glycol 3350 17 Gram(s) Oral daily  povidone iodine 10% Solution 1 Application(s) Topical every 12 hours  tiotropium 18 MICROgram(s) Capsule 1 Capsule(s) Inhalation daily  zinc oxide 20% Ointment 1 Application(s) Topical daily    MEDICATIONS  (PRN):  acetaminophen   Tablet 650 milliGRAM(s) Oral every 6 hours PRN For Temp greater than 38 C (100.4 F)  acetaminophen   Tablet. 650 milliGRAM(s) Oral every 6 hours PRN Mild Pain (1 - 3)  bisacodyl Suppository 10 milliGRAM(s) Rectal daily PRN Constipation  HYDROmorphone   Tablet 2 milliGRAM(s) Oral every 4 hours PRN Severe Pain (7 - 10)  HYDROmorphone  Injectable 0.5 milliGRAM(s) IV Push daily PRN Dressing change  lidocaine 2% Gel 1 Application(s) Topical daily PRN dressing change  lidocaine 2% Viscous 10 milliLiter(s) Swish and Spit every 6 hours PRN Mouth Sores  senna 2 Tablet(s) Oral at bedtime PRN Constipation    ** PHYSICAL EXAM **  -- CONSTITUTIONAL: AOx3. NAD.   -- ABDOMEN: wound with NPWT applied yesterday, holding suction  -- EXTREMITIES: The right thigh wound has a VAC in place. Stable eschar overlying patella, painted with betadine.  Medial wound with saline moistened gauze. Foam dressing between thighs to protect counter area.

## 2018-05-15 NOTE — PROGRESS NOTE ADULT - ATTENDING COMMENTS
Reina Murphy MD  [facility]    Dr Marrero will be covering me starting   5/ 16 /18  Please page

## 2018-05-16 DIAGNOSIS — R52 PAIN, UNSPECIFIED: ICD-10-CM

## 2018-05-16 LAB
INR BLD: 3.17 RATIO — HIGH (ref 0.88–1.16)
MAGNESIUM SERPL-MCNC: 1.6 MG/DL — SIGNIFICANT CHANGE UP (ref 1.6–2.6)
PROTHROM AB SERPL-ACNC: 36.7 SEC — HIGH (ref 10–13.1)

## 2018-05-16 PROCEDURE — 99232 SBSQ HOSP IP/OBS MODERATE 35: CPT

## 2018-05-16 PROCEDURE — 73562 X-RAY EXAM OF KNEE 3: CPT | Mod: 26,RT

## 2018-05-16 RX ORDER — HYDROMORPHONE HYDROCHLORIDE 2 MG/ML
2 INJECTION INTRAMUSCULAR; INTRAVENOUS; SUBCUTANEOUS EVERY 4 HOURS
Qty: 0 | Refills: 0 | Status: DISCONTINUED | OUTPATIENT
Start: 2018-05-16 | End: 2018-05-17

## 2018-05-16 RX ORDER — HYDROMORPHONE HYDROCHLORIDE 2 MG/ML
0.5 INJECTION INTRAMUSCULAR; INTRAVENOUS; SUBCUTANEOUS ONCE
Qty: 0 | Refills: 0 | Status: DISCONTINUED | OUTPATIENT
Start: 2018-05-16 | End: 2018-05-16

## 2018-05-16 RX ADMIN — HYDROMORPHONE HYDROCHLORIDE 0.5 MILLIGRAM(S): 2 INJECTION INTRAMUSCULAR; INTRAVENOUS; SUBCUTANEOUS at 23:30

## 2018-05-16 RX ADMIN — ZINC OXIDE 1 APPLICATION(S): 200 OINTMENT TOPICAL at 12:05

## 2018-05-16 RX ADMIN — HYDROMORPHONE HYDROCHLORIDE 0.5 MILLIGRAM(S): 2 INJECTION INTRAMUSCULAR; INTRAVENOUS; SUBCUTANEOUS at 12:45

## 2018-05-16 RX ADMIN — HYDROMORPHONE HYDROCHLORIDE 2 MILLIGRAM(S): 2 INJECTION INTRAMUSCULAR; INTRAVENOUS; SUBCUTANEOUS at 10:58

## 2018-05-16 RX ADMIN — HYDROMORPHONE HYDROCHLORIDE 0.5 MILLIGRAM(S): 2 INJECTION INTRAMUSCULAR; INTRAVENOUS; SUBCUTANEOUS at 11:59

## 2018-05-16 RX ADMIN — Medication 1 MILLIGRAM(S): at 12:02

## 2018-05-16 RX ADMIN — Medication 1 TABLET(S): at 12:02

## 2018-05-16 RX ADMIN — CLOTRIMAZOLE AND BETAMETHASONE DIPROPIONATE 1 APPLICATION(S): 10; .5 CREAM TOPICAL at 18:09

## 2018-05-16 RX ADMIN — Medication 500000 UNIT(S): at 00:39

## 2018-05-16 RX ADMIN — NYSTATIN CREAM 1 APPLICATION(S): 100000 CREAM TOPICAL at 12:03

## 2018-05-16 RX ADMIN — BUDESONIDE AND FORMOTEROL FUMARATE DIHYDRATE 2 PUFF(S): 160; 4.5 AEROSOL RESPIRATORY (INHALATION) at 06:16

## 2018-05-16 RX ADMIN — Medication 325 MILLIGRAM(S): at 12:02

## 2018-05-16 RX ADMIN — Medication 1 APPLICATION(S): at 18:11

## 2018-05-16 RX ADMIN — PANTOPRAZOLE SODIUM 40 MILLIGRAM(S): 20 TABLET, DELAYED RELEASE ORAL at 06:17

## 2018-05-16 RX ADMIN — HYDROMORPHONE HYDROCHLORIDE 2 MILLIGRAM(S): 2 INJECTION INTRAMUSCULAR; INTRAVENOUS; SUBCUTANEOUS at 20:15

## 2018-05-16 RX ADMIN — Medication 500 MILLIGRAM(S): at 12:02

## 2018-05-16 RX ADMIN — HYDROMORPHONE HYDROCHLORIDE 0.5 MILLIGRAM(S): 2 INJECTION INTRAMUSCULAR; INTRAVENOUS; SUBCUTANEOUS at 22:18

## 2018-05-16 RX ADMIN — NYSTATIN CREAM 1 APPLICATION(S): 100000 CREAM TOPICAL at 23:23

## 2018-05-16 RX ADMIN — HYDROMORPHONE HYDROCHLORIDE 2 MILLIGRAM(S): 2 INJECTION INTRAMUSCULAR; INTRAVENOUS; SUBCUTANEOUS at 02:58

## 2018-05-16 RX ADMIN — Medication 650 MILLIGRAM(S): at 23:23

## 2018-05-16 RX ADMIN — Medication 500000 UNIT(S): at 06:17

## 2018-05-16 RX ADMIN — Medication 81 MILLIGRAM(S): at 12:02

## 2018-05-16 RX ADMIN — Medication 650 MILLIGRAM(S): at 15:40

## 2018-05-16 RX ADMIN — NYSTATIN CREAM 1 APPLICATION(S): 100000 CREAM TOPICAL at 00:39

## 2018-05-16 RX ADMIN — MINOCYCLINE HYDROCHLORIDE 100 MILLIGRAM(S): 45 TABLET, EXTENDED RELEASE ORAL at 18:08

## 2018-05-16 RX ADMIN — Medication 1 APPLICATION(S): at 06:17

## 2018-05-16 RX ADMIN — HYDROMORPHONE HYDROCHLORIDE 0.5 MILLIGRAM(S): 2 INJECTION INTRAMUSCULAR; INTRAVENOUS; SUBCUTANEOUS at 10:58

## 2018-05-16 RX ADMIN — BUDESONIDE AND FORMOTEROL FUMARATE DIHYDRATE 2 PUFF(S): 160; 4.5 AEROSOL RESPIRATORY (INHALATION) at 18:09

## 2018-05-16 RX ADMIN — Medication 100 MILLIGRAM(S): at 06:17

## 2018-05-16 RX ADMIN — CLOTRIMAZOLE AND BETAMETHASONE DIPROPIONATE 1 APPLICATION(S): 10; .5 CREAM TOPICAL at 06:41

## 2018-05-16 RX ADMIN — Medication 650 MILLIGRAM(S): at 16:36

## 2018-05-16 RX ADMIN — TIOTROPIUM BROMIDE 1 CAPSULE(S): 18 CAPSULE ORAL; RESPIRATORY (INHALATION) at 12:10

## 2018-05-16 RX ADMIN — Medication 100 MILLIGRAM(S): at 15:40

## 2018-05-16 RX ADMIN — HYDROMORPHONE HYDROCHLORIDE 0.5 MILLIGRAM(S): 2 INJECTION INTRAMUSCULAR; INTRAVENOUS; SUBCUTANEOUS at 10:01

## 2018-05-16 RX ADMIN — HYDROMORPHONE HYDROCHLORIDE 2 MILLIGRAM(S): 2 INJECTION INTRAMUSCULAR; INTRAVENOUS; SUBCUTANEOUS at 03:28

## 2018-05-16 RX ADMIN — Medication 500000 UNIT(S): at 18:08

## 2018-05-16 RX ADMIN — MINOCYCLINE HYDROCHLORIDE 100 MILLIGRAM(S): 45 TABLET, EXTENDED RELEASE ORAL at 06:17

## 2018-05-16 RX ADMIN — HYDROMORPHONE HYDROCHLORIDE 2 MILLIGRAM(S): 2 INJECTION INTRAMUSCULAR; INTRAVENOUS; SUBCUTANEOUS at 11:59

## 2018-05-16 RX ADMIN — Medication 500000 UNIT(S): at 12:11

## 2018-05-16 RX ADMIN — Medication 650 MILLIGRAM(S): at 06:54

## 2018-05-16 RX ADMIN — POLYETHYLENE GLYCOL 3350 17 GRAM(S): 17 POWDER, FOR SOLUTION ORAL at 12:03

## 2018-05-16 RX ADMIN — HYDROMORPHONE HYDROCHLORIDE 2 MILLIGRAM(S): 2 INJECTION INTRAMUSCULAR; INTRAVENOUS; SUBCUTANEOUS at 19:45

## 2018-05-16 RX ADMIN — Medication 650 MILLIGRAM(S): at 06:16

## 2018-05-16 NOTE — PROGRESS NOTE ADULT - SUBJECTIVE AND OBJECTIVE BOX
PLASTIC SURGERY DAILY PROGRESS NOTE:    SUBJECTIVE:  Patient seen and examined. No acute events overnight.     OBJECTIVE:   Vital Signs Last 24 Hrs  T(C): 36.5 (16 May 2018 06:00), Max: 36.9 (15 May 2018 22:14)  T(F): 97.7 (16 May 2018 06:00), Max: 98.4 (15 May 2018 22:14)  HR: 77 (16 May 2018 06:00) (75 - 96)  BP: 151/77 (16 May 2018 06:00) (115/71 - 151/77)  BP(mean): --  RR: 18 (16 May 2018 06:00) (18 - 18)  SpO2: 97% (16 May 2018 06:00) (94% - 97%)    I&O's Detail    15 May 2018 07:01  -  16 May 2018 07:00  --------------------------------------------------------  IN:    Oral Fluid: 240 mL  Total IN: 240 mL    OUT:  Total OUT: 0 mL    Total NET: 240 mL    MEDICATIONS  (STANDING):  acetaminophen   Tablet. 650 milliGRAM(s) Oral every 8 hours  ascorbic acid 500 milliGRAM(s) Oral daily  aspirin enteric coated 81 milliGRAM(s) Oral daily  buDESOnide 160 MICROgram(s)/formoterol 4.5 MICROgram(s) Inhaler 2 Puff(s) Inhalation two times a day  clotrimazole/betamethasone Cream 1 Application(s) Topical two times a day  docusate sodium 100 milliGRAM(s) Oral three times a day  ferrous    sulfate 325 milliGRAM(s) Oral daily  folic acid 1 milliGRAM(s) Oral daily  melatonin 3 milliGRAM(s) Oral at bedtime  minocycline 100 milliGRAM(s) Oral two times a day  multivitamin 1 Tablet(s) Oral daily  nystatin    Suspension 282092 Unit(s) Oral every 6 hours  nystatin Powder 1 Application(s) Topical two times a day  pantoprazole    Tablet 40 milliGRAM(s) Oral before breakfast  polyethylene glycol 3350 17 Gram(s) Oral daily  povidone iodine 10% Solution 1 Application(s) Topical every 12 hours  tiotropium 18 MICROgram(s) Capsule 1 Capsule(s) Inhalation daily  zinc oxide 20% Ointment 1 Application(s) Topical daily    MEDICATIONS  (PRN):  acetaminophen   Tablet 650 milliGRAM(s) Oral every 6 hours PRN For Temp greater than 38 C (100.4 F)  bisacodyl Suppository 10 milliGRAM(s) Rectal daily PRN Constipation  HYDROmorphone   Tablet 2 milliGRAM(s) Oral every 4 hours PRN Severe Pain (7 - 10)  HYDROmorphone  Injectable 0.5 milliGRAM(s) IV Push daily PRN Dressing change  lidocaine 2% Gel 1 Application(s) Topical daily PRN dressing change  lidocaine 2% Viscous 10 milliLiter(s) Swish and Spit every 6 hours PRN Mouth Sores  senna 2 Tablet(s) Oral at bedtime PRN Constipation    ** PHYSICAL EXAM **  -- CONSTITUTIONAL: AOx3. NAD.   -- ABDOMEN: wound with NPWT, holding suction  -- EXTREMITIES: The right thigh wound has a VAC in place. Stable eschar overlying patella, painted with betadine.  Medial wound with saline moistened gauze.

## 2018-05-16 NOTE — PROGRESS NOTE ADULT - SUBJECTIVE AND OBJECTIVE BOX
CHIEF COMPLAINT:    Interval Events:    REVIEW OF SYSTEMS:  Constitutional: No fevers or chills. No weight loss. No fatigue or generalized malaise.  Eyes: No itching or discharge from the eyes  ENT: No ear pain. No ear discharge. No nasal congestion. No post nasal drip. No epistaxis. No throat pain. No sore throat. No difficulty swallowing.   CV: No chest pain. No palpitations. No lightheadedness or dizziness.   Resp: No dyspnea at rest. No dyspnea on exertion. No orthopnea. No wheezing. No cough. No stridor. No sputum production. No chest pain with respiration.  GI: No nausea. No vomiting. No diarrhea.  MSK: No joint pain or pain in any extremities  Integumentary: No skin lesions. No pedal edema.  Neurological: No gross motor weakness. No sensory changes.  [ ] All other systems negative  [ ] Unable to assess ROS because ________    OBJECTIVE:  ICU Vital Signs Last 24 Hrs  T(C): 36.9 (15 May 2018 22:14), Max: 36.9 (15 May 2018 22:14)  T(F): 98.4 (15 May 2018 22:14), Max: 98.4 (15 May 2018 22:14)  HR: 75 (15 May 2018 22:14) (75 - 96)  BP: 123/80 (15 May 2018 22:14) (115/71 - 123/80)  BP(mean): --  ABP: --  ABP(mean): --  RR: 18 (15 May 2018 22:14) (18 - 18)  SpO2: 96% (15 May 2018 22:14) (94% - 96%)        05-14 @ 07:01  -  05-15 @ 07:00  --------------------------------------------------------  IN: 840 mL / OUT: 250 mL / NET: 590 mL      CAPILLARY BLOOD GLUCOSE          PHYSICAL EXAM:  General: Awake, alert, oriented X 3.   HEENT: Atraumatic, normocephalic.                 Mallampatti Grade                 No nasal congestion.                No tonsillar or pharyngeal exudates.  Lymph Nodes: No palpable lymphadenopathy  Neck: No JVD. No carotid bruit.   Respiratory: Normal chest expansion                         Normal percussion                         Normal and equal air entry                         No wheeze, rhonchi or rales.  Cardiovascular: S1 S2 normal. No murmurs, rubs or gallops.   Abdomen: Soft, non-tender, non-distended. No organomegaly.  Extremities: Warm to touch. Peripheral pulse palpable. No pedal edema.   Skin: No rashes or skin lesions  Neurological: Motor and sensory examination equal and normal in all four extremities.  Psychiatry: Appropriate mood and affect.    HOSPITAL MEDICATIONS:  MEDICATIONS  (STANDING):  acetaminophen   Tablet. 650 milliGRAM(s) Oral every 8 hours  ascorbic acid 500 milliGRAM(s) Oral daily  aspirin enteric coated 81 milliGRAM(s) Oral daily  buDESOnide 160 MICROgram(s)/formoterol 4.5 MICROgram(s) Inhaler 2 Puff(s) Inhalation two times a day  clotrimazole/betamethasone Cream 1 Application(s) Topical two times a day  docusate sodium 100 milliGRAM(s) Oral three times a day  ferrous    sulfate 325 milliGRAM(s) Oral daily  folic acid 1 milliGRAM(s) Oral daily  melatonin 3 milliGRAM(s) Oral at bedtime  minocycline 100 milliGRAM(s) Oral two times a day  multivitamin 1 Tablet(s) Oral daily  nystatin    Suspension 153705 Unit(s) Oral every 6 hours  nystatin Powder 1 Application(s) Topical two times a day  pantoprazole    Tablet 40 milliGRAM(s) Oral before breakfast  polyethylene glycol 3350 17 Gram(s) Oral daily  povidone iodine 10% Solution 1 Application(s) Topical every 12 hours  tiotropium 18 MICROgram(s) Capsule 1 Capsule(s) Inhalation daily  zinc oxide 20% Ointment 1 Application(s) Topical daily    MEDICATIONS  (PRN):  acetaminophen   Tablet 650 milliGRAM(s) Oral every 6 hours PRN For Temp greater than 38 C (100.4 F)  bisacodyl Suppository 10 milliGRAM(s) Rectal daily PRN Constipation  HYDROmorphone   Tablet 2 milliGRAM(s) Oral every 4 hours PRN Severe Pain (7 - 10)  HYDROmorphone  Injectable 0.5 milliGRAM(s) IV Push daily PRN Dressing change  lidocaine 2% Gel 1 Application(s) Topical daily PRN dressing change  lidocaine 2% Viscous 10 milliLiter(s) Swish and Spit every 6 hours PRN Mouth Sores  senna 2 Tablet(s) Oral at bedtime PRN Constipation      LABS:                        8.5    9.44  )-----------( 291      ( 15 May 2018 07:37 )             27.7     05-15    139  |  99  |  14  ----------------------------<  106<H>  3.7   |  30  |  0.61    Ca    9.7      15 May 2018 07:07  Mg     1.4     05-14      PT/INR - ( 15 May 2018 09:36 )   PT: 32.4 sec;   INR: 2.81 ratio                   MICROBIOLOGY:     RADIOLOGY:  [ ] Reviewed and interpreted by me    Point of Care Ultrasound Findings:    PFT:    EKG: CHIEF COMPLAINT: fatigued--no sob or cough; not getting out of bed (refusing)    Interval Events: plastics/ortho    REVIEW OF SYSTEMS:  Constitutional: No fevers or chills. No weight loss. + fatigue or generalized malaise.  Eyes: No itching or discharge from the eyes  ENT: No ear pain. No ear discharge. No nasal congestion. No post nasal drip. No epistaxis. No throat pain. No sore throat. No difficulty swallowing.   CV: No chest pain. No palpitations. No lightheadedness or dizziness.   Resp: No dyspnea at rest. + dyspnea on exertion. No orthopnea. No wheezing. No cough. No stridor. No sputum production. No chest pain with respiration.  GI: No nausea. No vomiting. No diarrhea.  MSK: No joint pain or pain in any extremities-except RLE  Integumentary: No skin lesions. No pedal edema.-except RLE  Neurological: No gross motor weakness. No sensory changes.  [+ ] All other systems negative  [ ] Unable to assess ROS because ________    OBJECTIVE:  ICU Vital Signs Last 24 Hrs  T(C): 36.9 (15 May 2018 22:14), Max: 36.9 (15 May 2018 22:14)  T(F): 98.4 (15 May 2018 22:14), Max: 98.4 (15 May 2018 22:14)  HR: 75 (15 May 2018 22:14) (75 - 96)  BP: 123/80 (15 May 2018 22:14) (115/71 - 123/80)  BP(mean): --  ABP: --  ABP(mean): --  RR: 18 (15 May 2018 22:14) (18 - 18)  SpO2: 96% (15 May 2018 22:14) (94% - 96%)        05-14 @ 07:01  -  05-15 @ 07:00  --------------------------------------------------------  IN: 840 mL / OUT: 250 mL / NET: 590 mL      CAPILLARY BLOOD GLUCOSE          PHYSICAL EXAM: NAD in bed  General: Awake, alert, oriented X 3.   HEENT: Atraumatic, normocephalic.                 Mallampatti Grade 3                No nasal congestion.                No tonsillar or pharyngeal exudates.  Lymph Nodes: No palpable lymphadenopathy  Neck: No JVD. No carotid bruit.   Respiratory: abnormal chest expansion-reduced BS bases                         Normal percussion                         Normal and equal air entry                         No wheeze, rhonchi or rales.  Cardiovascular: S1 S2 normal. No murmurs, rubs or gallops.   Abdomen: Soft, non-tender, non-distended. No organomegaly.  Extremities: Warm to touch. Peripheral pulse palpable. No pedal edema. RLE wound-wrapped/VAC  Skin: No rashes or skin lesions  Neurological: Motor and sensory examination equal and normal in all four extremities.  Psychiatry: Appropriate mood and affect.    HOSPITAL MEDICATIONS:  MEDICATIONS  (STANDING):  acetaminophen   Tablet. 650 milliGRAM(s) Oral every 8 hours  ascorbic acid 500 milliGRAM(s) Oral daily  aspirin enteric coated 81 milliGRAM(s) Oral daily  buDESOnide 160 MICROgram(s)/formoterol 4.5 MICROgram(s) Inhaler 2 Puff(s) Inhalation two times a day  clotrimazole/betamethasone Cream 1 Application(s) Topical two times a day  docusate sodium 100 milliGRAM(s) Oral three times a day  ferrous    sulfate 325 milliGRAM(s) Oral daily  folic acid 1 milliGRAM(s) Oral daily  melatonin 3 milliGRAM(s) Oral at bedtime  minocycline 100 milliGRAM(s) Oral two times a day  multivitamin 1 Tablet(s) Oral daily  nystatin    Suspension 603445 Unit(s) Oral every 6 hours  nystatin Powder 1 Application(s) Topical two times a day  pantoprazole    Tablet 40 milliGRAM(s) Oral before breakfast  polyethylene glycol 3350 17 Gram(s) Oral daily  povidone iodine 10% Solution 1 Application(s) Topical every 12 hours  tiotropium 18 MICROgram(s) Capsule 1 Capsule(s) Inhalation daily  zinc oxide 20% Ointment 1 Application(s) Topical daily    MEDICATIONS  (PRN):  acetaminophen   Tablet 650 milliGRAM(s) Oral every 6 hours PRN For Temp greater than 38 C (100.4 F)  bisacodyl Suppository 10 milliGRAM(s) Rectal daily PRN Constipation  HYDROmorphone   Tablet 2 milliGRAM(s) Oral every 4 hours PRN Severe Pain (7 - 10)  HYDROmorphone  Injectable 0.5 milliGRAM(s) IV Push daily PRN Dressing change  lidocaine 2% Gel 1 Application(s) Topical daily PRN dressing change  lidocaine 2% Viscous 10 milliLiter(s) Swish and Spit every 6 hours PRN Mouth Sores  senna 2 Tablet(s) Oral at bedtime PRN Constipation      LABS:                        8.5    9.44  )-----------( 291      ( 15 May 2018 07:37 )             27.7     05-15    139  |  99  |  14  ----------------------------<  106<H>  3.7   |  30  |  0.61    Ca    9.7      15 May 2018 07:07  Mg     1.4     05-14      PT/INR - ( 15 May 2018 09:36 )   PT: 32.4 sec;   INR: 2.81 ratio                   MICROBIOLOGY:     RADIOLOGY:  [ ] Reviewed and interpreted by me    Point of Care Ultrasound Findings:    PFT:    EKG:

## 2018-05-16 NOTE — PROGRESS NOTE ADULT - ASSESSMENT
A: 77F with complex PMH/PSH who recently had placement of a right knee antibiotic spacer followed by PRS closure c/b skin necrosis and formation of an eschar over the anterior knee joint. The patient was readmitted with fevers and right thigh cellulitis which was complicated by skin breakdown. Patient also developed wound at the right/mid lower quadrant of the abdomen.    P:  >> Dressing change TID: Saline moistened gauze to knee  >> Betadine to eschar bid  >> VACs on abdomen and R thigh to be changed today  >> Medical optimization as per primary team.  >> DVT prophylaxis.  >> Protein supplementation of diet to encourage wound healing.   >> Discussed with Dr. Mg.     [facility] Plastic Surgery Pager -- [phone]  [facility] Plastic Surgery Pager -- [phone]

## 2018-05-16 NOTE — CHART NOTE - NSCHARTNOTEFT_GEN_A_CORE
follow up- pt with chronic pain and pain intensity varies with during care/ change in position/ wound care/physical therapy;  pain medication adjusted ;called palliative consult for pain management and advised to call chronic pain management- called pain management [phone];  d/w pt;

## 2018-05-16 NOTE — PROGRESS NOTE ADULT - SUBJECTIVE AND OBJECTIVE BOX
pain controlled, afebrile    abdominal wound - vac in place  RLE  thigh vac in place  surgical wound intact with stable eschar, some separation of eschar medially from skin, no exudate, dressed by prs/wound care  5/5 ta/ehl/gcs  silt l4-s1  2+ dp pulse

## 2018-05-16 NOTE — PROGRESS NOTE ADULT - SUBJECTIVE AND OBJECTIVE BOX
Patient is a 77y old  Female who presents with a chief complaint of fever (11 Apr 2018 14:25)      SUBJECTIVE / OVERNIGHT EVENTS:    Patient seen and examined.       Vital Signs Last 24 Hrs  T(C): 36.5 (16 May 2018 06:00), Max: 36.9 (15 May 2018 22:14)  T(F): 97.7 (16 May 2018 06:00), Max: 98.4 (15 May 2018 22:14)  HR: 77 (16 May 2018 06:00) (75 - 96)  BP: 151/77 (16 May 2018 06:00) (115/71 - 151/77)  BP(mean): --  RR: 18 (16 May 2018 06:00) (18 - 18)  SpO2: 97% (16 May 2018 06:00) (94% - 97%)  I&O's Summary    15 May 2018 07:01  -  16 May 2018 07:00  --------------------------------------------------------  IN: 240 mL / OUT: 0 mL / NET: 240 mL        PE:  GENERAL: NAD, AAOx3, obese  HEAD:  Atraumatic, Normocephalic  EYES: EOMI, PERRLA, conjunctiva and sclera clear  NECK: Supple, No JVD  CHEST/LUNG: CTABL, No wheeze  HEART: Regular rate and rhythm; no murmur  ABDOMEN: Soft, Nontender, Nondistended; Bowel sounds present  EXTREMITIES:  2+ Peripheral Pulses,  SKIN: No rashes or lesions  NEURO: No focal deficits    LABS:                        8.5    9.44  )-----------( 291      ( 15 May 2018 07:37 )             27.7     05-15    139  |  99  |  14  ----------------------------<  106<H>  3.7   |  30  |  0.61    Ca    9.7      15 May 2018 07:07  Mg     1.6     05-16      PT/INR - ( 15 May 2018 09:36 )   PT: 32.4 sec;   INR: 2.81 ratio           CAPILLARY BLOOD GLUCOSE                RADIOLOGY & ADDITIONAL TESTS:    Imaging Personally Reviewed:  [x] YES  [ ] NO    Consultant(s) Notes Reviewed:  [x] YES  [ ] NO    MEDICATIONS  (STANDING):  acetaminophen   Tablet. 650 milliGRAM(s) Oral every 8 hours  ascorbic acid 500 milliGRAM(s) Oral daily  aspirin enteric coated 81 milliGRAM(s) Oral daily  buDESOnide 160 MICROgram(s)/formoterol 4.5 MICROgram(s) Inhaler 2 Puff(s) Inhalation two times a day  clotrimazole/betamethasone Cream 1 Application(s) Topical two times a day  docusate sodium 100 milliGRAM(s) Oral three times a day  ferrous    sulfate 325 milliGRAM(s) Oral daily  folic acid 1 milliGRAM(s) Oral daily  melatonin 3 milliGRAM(s) Oral at bedtime  minocycline 100 milliGRAM(s) Oral two times a day  multivitamin 1 Tablet(s) Oral daily  nystatin    Suspension 916569 Unit(s) Oral every 6 hours  nystatin Powder 1 Application(s) Topical two times a day  pantoprazole    Tablet 40 milliGRAM(s) Oral before breakfast  polyethylene glycol 3350 17 Gram(s) Oral daily  povidone iodine 10% Solution 1 Application(s) Topical every 12 hours  tiotropium 18 MICROgram(s) Capsule 1 Capsule(s) Inhalation daily  zinc oxide 20% Ointment 1 Application(s) Topical daily    MEDICATIONS  (PRN):  acetaminophen   Tablet 650 milliGRAM(s) Oral every 6 hours PRN For Temp greater than 38 C (100.4 F)  bisacodyl Suppository 10 milliGRAM(s) Rectal daily PRN Constipation  HYDROmorphone   Tablet 2 milliGRAM(s) Oral every 4 hours PRN Severe Pain (7 - 10)  HYDROmorphone  Injectable 0.5 milliGRAM(s) IV Push daily PRN Dressing change  lidocaine 2% Gel 1 Application(s) Topical daily PRN dressing change  lidocaine 2% Viscous 10 milliLiter(s) Swish and Spit every 6 hours PRN Mouth Sores  senna 2 Tablet(s) Oral at bedtime PRN Constipation      Care Discussed with Consultants/Other Providers [x] YES  [ ] NO    HEALTH ISSUES - PROBLEM Dx:  Adjustment disorder, unspecified type  Delirium due to another medical condition  Pneumonia: Pneumonia  Aortic stenosis, severe: Aortic stenosis, severe  MYAH (obstructive sleep apnea): MYAH (obstructive sleep apnea)  Obesity: Obesity  Asthma: Asthma  SOB (shortness of breath): SOB (shortness of breath)  Chronic deep vein thrombosis (DVT) of lower extremity, unspecified laterality, unspecified vein: Chronic deep vein thrombosis (DVT) of lower extremity, unspecified laterality, unspecified vein  Arthralgia of knee, unspecified laterality: Arthralgia of knee, unspecified laterality  Hyperlipidemia, unspecified hyperlipidemia type: Hyperlipidemia, unspecified hyperlipidemia type  Asthma, unspecified asthma severity, unspecified whether complicated, unspecified whether persistent: Asthma, unspecified asthma severity, unspecified whether complicated, unspecified whether persistent  Coronary artery disease involving native coronary artery of native heart without angina pectoris: Coronary artery disease involving native coronary artery of native heart without angina pectoris  Gastroesophageal reflux disease, esophagitis presence not specified: Gastroesophageal reflux disease, esophagitis presence not specified  Fever, unspecified fever cause: Fever, unspecified fever cause Patient is a 77y old  Female who presents with a chief complaint of fever (11 Apr 2018 14:25)      SUBJECTIVE / OVERNIGHT EVENTS:    Patient seen and examined. denies cp sob nvd.      Vital Signs Last 24 Hrs  T(C): 36.5 (16 May 2018 06:00), Max: 36.9 (15 May 2018 22:14)  T(F): 97.7 (16 May 2018 06:00), Max: 98.4 (15 May 2018 22:14)  HR: 77 (16 May 2018 06:00) (75 - 96)  BP: 151/77 (16 May 2018 06:00) (115/71 - 151/77)  BP(mean): --  RR: 18 (16 May 2018 06:00) (18 - 18)  SpO2: 97% (16 May 2018 06:00) (94% - 97%)  I&O's Summary    15 May 2018 07:01  -  16 May 2018 07:00  --------------------------------------------------------  IN: 240 mL / OUT: 0 mL / NET: 240 mL        PE:  GENERAL: NAD, AAOx3, obese  HEAD:  Atraumatic, Normocephalic  EYES: EOMI, PERRLA, conjunctiva and sclera clear  NECK: Supple, No JVD  CHEST/LUNG: CTABL, No wheeze  HEART: Regular rate and rhythm; no murmur  ABDOMEN: Soft, Nontender, Nondistended; abd wound with vac  EXTREMITIES:  2+ Peripheral Pulses, right upper thigh with vac, right knee dressed  SKIN: No rashes or lesions  NEURO: No focal deficits    LABS:                        8.5    9.44  )-----------( 291      ( 15 May 2018 07:37 )             27.7     05-15    139  |  99  |  14  ----------------------------<  106<H>  3.7   |  30  |  0.61    Ca    9.7      15 May 2018 07:07  Mg     1.6     05-16      PT/INR - ( 15 May 2018 09:36 )   PT: 32.4 sec;   INR: 2.81 ratio           CAPILLARY BLOOD GLUCOSE                RADIOLOGY & ADDITIONAL TESTS:    Imaging Personally Reviewed:  [x] YES  [ ] NO    Consultant(s) Notes Reviewed:  [x] YES  [ ] NO    MEDICATIONS  (STANDING):  acetaminophen   Tablet. 650 milliGRAM(s) Oral every 8 hours  ascorbic acid 500 milliGRAM(s) Oral daily  aspirin enteric coated 81 milliGRAM(s) Oral daily  buDESOnide 160 MICROgram(s)/formoterol 4.5 MICROgram(s) Inhaler 2 Puff(s) Inhalation two times a day  clotrimazole/betamethasone Cream 1 Application(s) Topical two times a day  docusate sodium 100 milliGRAM(s) Oral three times a day  ferrous    sulfate 325 milliGRAM(s) Oral daily  folic acid 1 milliGRAM(s) Oral daily  melatonin 3 milliGRAM(s) Oral at bedtime  minocycline 100 milliGRAM(s) Oral two times a day  multivitamin 1 Tablet(s) Oral daily  nystatin    Suspension 473816 Unit(s) Oral every 6 hours  nystatin Powder 1 Application(s) Topical two times a day  pantoprazole    Tablet 40 milliGRAM(s) Oral before breakfast  polyethylene glycol 3350 17 Gram(s) Oral daily  povidone iodine 10% Solution 1 Application(s) Topical every 12 hours  tiotropium 18 MICROgram(s) Capsule 1 Capsule(s) Inhalation daily  zinc oxide 20% Ointment 1 Application(s) Topical daily    MEDICATIONS  (PRN):  acetaminophen   Tablet 650 milliGRAM(s) Oral every 6 hours PRN For Temp greater than 38 C (100.4 F)  bisacodyl Suppository 10 milliGRAM(s) Rectal daily PRN Constipation  HYDROmorphone   Tablet 2 milliGRAM(s) Oral every 4 hours PRN Severe Pain (7 - 10)  HYDROmorphone  Injectable 0.5 milliGRAM(s) IV Push daily PRN Dressing change  lidocaine 2% Gel 1 Application(s) Topical daily PRN dressing change  lidocaine 2% Viscous 10 milliLiter(s) Swish and Spit every 6 hours PRN Mouth Sores  senna 2 Tablet(s) Oral at bedtime PRN Constipation      Care Discussed with Consultants/Other Providers [x] YES  [ ] NO    HEALTH ISSUES - PROBLEM Dx:  Adjustment disorder, unspecified type  Delirium due to another medical condition  Pneumonia: Pneumonia  Aortic stenosis, severe: Aortic stenosis, severe  MYAH (obstructive sleep apnea): MYAH (obstructive sleep apnea)  Obesity: Obesity  Asthma: Asthma  SOB (shortness of breath): SOB (shortness of breath)  Chronic deep vein thrombosis (DVT) of lower extremity, unspecified laterality, unspecified vein: Chronic deep vein thrombosis (DVT) of lower extremity, unspecified laterality, unspecified vein  Arthralgia of knee, unspecified laterality: Arthralgia of knee, unspecified laterality  Hyperlipidemia, unspecified hyperlipidemia type: Hyperlipidemia, unspecified hyperlipidemia type  Asthma, unspecified asthma severity, unspecified whether complicated, unspecified whether persistent: Asthma, unspecified asthma severity, unspecified whether complicated, unspecified whether persistent  Coronary artery disease involving native coronary artery of native heart without angina pectoris: Coronary artery disease involving native coronary artery of native heart without angina pectoris  Gastroesophageal reflux disease, esophagitis presence not specified: Gastroesophageal reflux disease, esophagitis presence not specified  Fever, unspecified fever cause: Fever, unspecified fever cause

## 2018-05-16 NOTE — PROGRESS NOTE ADULT - SUBJECTIVE AND OBJECTIVE BOX
Pt is a 77 year old female who was admitted several weeks ago with aspiration pneumonia and fever with right knee pain. She also had proximal thigh swelling and there was a hematoma of the leg as well. She has a history of PMR on steroids. She also has a history of tavr, AICD andf remote bilateral TKR and was found to have strep bacteremia and a ERIKA was negative and she also had a eschar of the right knee as well. She was placed on iv antibiotics and I understand that she will be completing the antibiotics, daptomycin on this date. She told me that she had anemia in the distant past and was placed on antibiotics. She is being seen by the plastics service as well. A ct of th femur was not remarkable for infection and a ct of the chest done several weeks ago showed bibasilar PNA. She was also noted to have a left renal lesion that is unchanged now since 12/2016.     The consult was requested to see if there is anything we can do for her anemia. The counts on the day of this admission was white cell count of 9.71 hemoglobin 8.4 hematocrit 26.4 MCV 86 and MCHC 31.8 and platelet count of [id number] the diff count was 74 polys 15 lymphs and 7 monos the bun was 9 and the creatinine was 0.6     5/4 the pt is stable and the iron studies are still pending at this time. The hemoglobin was 8.4. 5/7 the pt was seen and the notes over charu last few days. The pt will in the am have ferritin and iron studies sent off. 5/8 the ferritin came back as over 300 and therefore she is not iron deficient and the anemia is consistent with chronic disease. The use of epogen might be considered here if hemoglobin drops and can be implemented to reduce blood transfusions. 5/9 pt clearly looks better and repeat hemoglobin was stable at 8.1  PE weak appearing with female and needed help to do normal daily activities large eschar as described on the lower leg and at the time of my arrival, the wound vac was in place and she was being cared for by the staff.  5/8 the ferritin came back at over 300 and therefore she is not iron deficient and epo can be considered if the hemoglobin drops and is in need of blood support. 5/10 pt being evaluated for rehab and consider epo of hemoglobin drops below the 8 range. 5/11 notes reviewed and will order cbc on the weekend and decide on epo.  pmh as above in the body of the report  5/14 the hemoglobin remains at 8.1 and debridement and wound vac and abdominal wound and thigh wounds were being cared for. 5/15 stable clinically and looks better the hemoglobin without epo is up to 8.5 5/16 notes and labs reviewed and no change from heme point of view.                          8.5    9.44  )-----------( 291      ( 15 May 2018 07:37 )             27.7   Vital Signs Last 24 Hrs  T(C): 36.6 (16 May 2018 10:40), Max: 36.9 (15 May 2018 22:14)  T(F): 97.9 (16 May 2018 10:40), Max: 98.4 (15 May 2018 22:14)  HR: 73 (16 May 2018 10:40) (73 - 96)  BP: 137/84 (16 May 2018 10:40) (115/71 - 151/77)  BP(mean): --  RR: 18 (16 May 2018 10:40) (18 - 18)  SpO2: 95% (16 May 2018 10:40) (94% - 97%)             PE pt being cared for by the staff and she appeared to be stable.  T(C): 36.8 (14 May 2018 14:06), Max: 36.9 (14 May 2018 06:16)  T(F): 98.3 (14 May 2018 14:06), Max: 98.5 (14 May 2018 06:16)  HR: 78 (14 May 2018 14:06) (78 - 100)  BP: 148/81 (14 May 2018 14:06) (123/63 - 148/81)  BP(mean): --  RR: 18 (14 May 2018 14:06) (18 - 18)  SpO2: 93% (14 May 2018 14:06) (93% - 99%)  MEDICATIONS  (STANDING):  acetaminophen   Tablet. 650 milliGRAM(s) Oral every 8 hours  ascorbic acid 500 milliGRAM(s) Oral daily  aspirin enteric coated 81 milliGRAM(s) Oral daily  buDESOnide 160 MICROgram(s)/formoterol 4.5 MICROgram(s) Inhaler 2 Puff(s) Inhalation two times a day  clotrimazole/betamethasone Cream 1 Application(s) Topical two times a day  docusate sodium 100 milliGRAM(s) Oral three times a day  ferrous    sulfate 325 milliGRAM(s) Oral daily  folic acid 1 milliGRAM(s) Oral daily  melatonin 3 milliGRAM(s) Oral at bedtime  minocycline 100 milliGRAM(s) Oral two times a day  multivitamin 1 Tablet(s) Oral daily  nystatin    Suspension 052905 Unit(s) Oral every 6 hours  nystatin Powder 1 Application(s) Topical two times a day  pantoprazole    Tablet 40 milliGRAM(s) Oral before breakfast  polyethylene glycol 3350 17 Gram(s) Oral daily  povidone iodine 10% Solution 1 Application(s) Topical every 12 hours  tiotropium 18 MICROgram(s) Capsule 1 Capsule(s) Inhalation daily  zinc oxide 20% Ointment 1 Application(s) Topical daily

## 2018-05-16 NOTE — PROGRESS NOTE ADULT - ASSESSMENT
Patient with infected right tkr s/p rudy, spacer for strept infection, had poor wound healing so returned 3 months after and had spacer exchange and complex wound closure with mrsa infection found. She is about 2 weeks into dapto and returns with fever, leukocytosis, ongoing pain and eschar of right knee wound and what appears to be a hematoma of the right thigh. INR has been up so that is good reason for the thigh findings. Must consider infected hematoma possible. UTI is possible. Pneumonia is possible given basilar rales. retroperitoneal hematoma possible .  PMR decompensation or adrenal insufficiency a consideration at well.    Pulmonary stable relative to prior admits --  wound care in progress  Completed IV ABX-5/5 -42 days  Debridement done-5/5  Vac placed 5/7-slow progress Patient with infected right tkr s/p rudy, spacer for strept infection, had poor wound healing so returned 3 months after and had spacer exchange and complex wound closure with mrsa infection found. She is about 2 weeks into dapto and returns with fever, leukocytosis, ongoing pain and eschar of right knee wound and what appears to be a hematoma of the right thigh. INR has been up so that is good reason for the thigh findings. Must consider infected hematoma possible. UTI is possible. Pneumonia is possible given basilar rales. retroperitoneal hematoma possible .  PMR decompensation or adrenal insufficiency a consideration at well.    Pulmonary stable relative to prior admits --  wound care in progress  Completed IV ABX-5/5 -42 days  Debridement done-5/5  Vac placed 5/7-slow progress  DC planning in progress-nutrition optimization

## 2018-05-16 NOTE — CONSULT NOTE ADULT - SUBJECTIVE AND OBJECTIVE BOX
Chief Complaint:  right sided pain    HPI:   77 year old female with PMH including HLD, GERD, Anxiety, Anemia, CAD s/p HERBERT, severe AS (s/p TAVR & PPM) h/o MVR, PMR on chronic steroids, asthma, OA, s/p TKR with recent joint infection s/p explant and abx spacer on 12/23/17,  RLE DVT on Coumadin s/p right knee explantation of previously placed articulating antibiotic spacer, irrigation and debridement of the knee, placement of static antibiotic spacer, with a Plastic Surgery soft tissue complex wound closure presented from rehab with fever.  Patient currently with complaints of pain at various wound sites on right side of body.  Pain is described as burning and throbbing, currently 9/10 on pain scale, worsens with movement.  Patient is receiving PO Dilaudid 2mg prn, tolerating well, however continues to have episodes of severe pain requiring IVP Dilaudid.  Per RN at bedside patient experience hallucinations while taking Oxycontin ER.  Appetite good, no nausea, vomiting, last BM today.        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  Artificial cardiac pacemaker: 12/25/17  H/O cardiac catheterization: 11/29/16 HERBERT placed on LAD  H/O endoscopy: with dilatation of esophageal stricture 2013  S/P cataract surgery: x2; 3-4yr ago  S/P gastroplasty: 28 yrs ago  S/P cholecystectomy: more than 15 years ago  S/P total knee replacement: left, 15yr ago  S/P total knee replacement: right, 12yr ago  S/P hysterectomy: 24yr ago      FAMILY HISTORY:  No pertinent family history in first degree relatives      Allergies    amoxicillin (Other)    Intolerances    IV Contrast (Flushing (Skin); Nausea)      PAIN MEDICATIONS:  acetaminophen   Tablet 650 milliGRAM(s) Oral every 6 hours PRN  acetaminophen   Tablet. 650 milliGRAM(s) Oral every 8 hours  HYDROmorphone   Tablet 2 milliGRAM(s) Oral every 4 hours PRN  HYDROmorphone  Injectable 0.5 milliGRAM(s) IV Push daily PRN  melatonin 3 milliGRAM(s) Oral at bedtime    Heme:  aspirin enteric coated 81 milliGRAM(s) Oral daily    Antibiotics:  minocycline 100 milliGRAM(s) Oral two times a day  nystatin    Suspension 501633 Unit(s) Oral every 6 hours    Cardiovascular:    GI:  bisacodyl Suppository 10 milliGRAM(s) Rectal daily PRN  docusate sodium 100 milliGRAM(s) Oral three times a day  pantoprazole    Tablet 40 milliGRAM(s) Oral before breakfast  polyethylene glycol 3350 17 Gram(s) Oral daily  senna 2 Tablet(s) Oral at bedtime PRN    Endocrine:    All Other Medications:  ascorbic acid 500 milliGRAM(s) Oral daily  clotrimazole/betamethasone Cream 1 Application(s) Topical two times a day  ferrous    sulfate 325 milliGRAM(s) Oral daily  folic acid 1 milliGRAM(s) Oral daily  lidocaine 2% Gel 1 Application(s) Topical daily PRN  lidocaine 2% Viscous 10 milliLiter(s) Swish and Spit every 6 hours PRN  multivitamin 1 Tablet(s) Oral daily  nystatin Powder 1 Application(s) Topical two times a day  povidone iodine 10% Solution 1 Application(s) Topical every 12 hours  zinc oxide 20% Ointment 1 Application(s) Topical daily      Vital Signs Last 24 Hrs  T(C): 36.6 (16 May 2018 10:40), Max: 36.9 (15 May 2018 22:14)  T(F): 97.9 (16 May 2018 10:40), Max: 98.4 (15 May 2018 22:14)  HR: 73 (16 May 2018 10:40) (73 - 96)  BP: 137/84 (16 May 2018 10:40) (115/71 - 151/77)  BP(mean): --  RR: 18 (16 May 2018 10:40) (18 - 18)  SpO2: 95% (16 May 2018 10:40) (94% - 97%)    PAIN SCORE:   9      SCALE USED: (1-10 VNRS)             PHYSICAL EXAM:    GENERAL: NAD, well-groomed, well-developed, aide at bedside  NECK: Supple  NERVOUS SYSTEM:  Alert & Oriented X3, Good concentration; Moves all extremities  CHEST/LUNG:respiratory rate and effort WNL  HEART: Regular rate   ABDOMEN: obese, right lower quadrant and groin wound vac in place  EXTREMITIES: right lower extremity dressing CDI  SKIN: No rashes or lesions        LABS:                          8.5    9.44  )-----------( 291      ( 15 May 2018 07:37 )             27.7     05-15    139  |  99  |  14  ----------------------------<  106<H>  3.7   |  30  |  0.61    Ca    9.7      15 May 2018 07:07  Mg     1.6     05-16      PT/INR - ( 16 May 2018 07:48 )   PT: 36.7 sec;   INR: 3.17 ratio                 [x ]  [facility] Reviewed [phone]

## 2018-05-16 NOTE — PROGRESS NOTE ADULT - ASSESSMENT
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     # Recent septic Rt TKR - abx spacer exchanged and plastics complex wound closure  # PMR on chronic steroids  # thigh wound   # right knee eschar, right thigh necrosis and abd wound   # Anemia    Plan:  Plastics and otho on the case. f/u appreciated.  cont local wound care as family declining surgery  s/p multiple debridements, cont wet to dry dressing abd and knee wounds TID  wound vac for thigh wound and abd wound  remains afebrile, cont suppressive minocycline, appreciate ID f/u.   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     # Recent septic Rt TKR - abx spacer exchanged and plastics complex wound closure  # PMR on chronic steroids  # thigh wound   # right knee eschar, right thigh necrosis and abd wound   # Anemia    Plan:  Plastics and otho on the case. f/u appreciated.  cont local wound care as family declining surgery  s/p multiple debridements, cont wet to dry dressing abd and knee wounds TID  wound vac for thigh wound and abd wound  remains afebrile, cont suppressive minocycline, appreciate ID f/u.   appreciate psych eval  heme follow up  cont coumadin and asa  pain control start tylenol q8 atc, dialuadid 0.5 iv before dressing change and dilaudid 2mg q4 prn  PT OOB encourage ambulation, dw patient importance of turning and trying to get out of bed  bowel regimen

## 2018-05-16 NOTE — PROGRESS NOTE ADULT - ATTENDING COMMENTS
as above- Wound care and nutrition appear to be primary issues --consequent Psych breakdown.  Pulm relatively stable-atelectasis, prior PE, asthma, cardiac Dz  Inhaler regimen as outlined above  DVT rx /prophylaxis  ID follow up of ABX  completed 5/5--then minocycline  (debridement done 5/5)--following WBC   PT evaluation and Psych evaluation    ortho/plastics follow up-knee immobilizer/PT--advance ambulation as able; Nutrition follow up  Rehab pending-pulm. cleared for DC to rehab.    Everett Kumar MD-Pulmonary   [phone] as above- Wound care and nutrition appear to be primary issues --consequent Psych breakdown.  Pulm relatively stable-atelectasis, prior PE, asthma, cardiac Dz  Inhaler regimen as outlined above    ID follow up of ABX IV completed 5/5--then PO minocycline  (debridement done 5/5)--following WBC   PT evaluation and Psych evaluation    ortho/plastics follow up-knee immobilizer/PT--advance ambulation as able; Nutrition follow up  Rehab pending-pulm. cleared for DC to rehab.    Everett Kumar MD-Pulmonary   [phone]

## 2018-05-17 LAB
ANION GAP SERPL CALC-SCNC: 10 MMOL/L — SIGNIFICANT CHANGE UP (ref 5–17)
BASOPHILS # BLD AUTO: 0.02 K/UL — SIGNIFICANT CHANGE UP (ref 0–0.2)
BASOPHILS NFR BLD AUTO: 0.2 % — SIGNIFICANT CHANGE UP (ref 0–2)
BUN SERPL-MCNC: 14 MG/DL — SIGNIFICANT CHANGE UP (ref 7–23)
CALCIUM SERPL-MCNC: 9.3 MG/DL — SIGNIFICANT CHANGE UP (ref 8.4–10.5)
CHLORIDE SERPL-SCNC: 101 MMOL/L — SIGNIFICANT CHANGE UP (ref 96–108)
CO2 SERPL-SCNC: 31 MMOL/L — SIGNIFICANT CHANGE UP (ref 22–31)
CREAT SERPL-MCNC: 0.61 MG/DL — SIGNIFICANT CHANGE UP (ref 0.5–1.3)
EOSINOPHIL # BLD AUTO: 0.08 K/UL — SIGNIFICANT CHANGE UP (ref 0–0.5)
EOSINOPHIL NFR BLD AUTO: 0.8 % — SIGNIFICANT CHANGE UP (ref 0–6)
GLUCOSE SERPL-MCNC: 131 MG/DL — HIGH (ref 70–99)
HCT VFR BLD CALC: 26.6 % — LOW (ref 34.5–45)
HGB BLD-MCNC: 8.1 G/DL — LOW (ref 11.5–15.5)
IMM GRANULOCYTES NFR BLD AUTO: 0.7 % — SIGNIFICANT CHANGE UP (ref 0–1.5)
INR BLD: 3.28 RATIO — HIGH (ref 0.88–1.16)
LYMPHOCYTES # BLD AUTO: 2.18 K/UL — SIGNIFICANT CHANGE UP (ref 1–3.3)
LYMPHOCYTES # BLD AUTO: 21.8 % — SIGNIFICANT CHANGE UP (ref 13–44)
MCHC RBC-ENTMCNC: 26.8 PG — LOW (ref 27–34)
MCHC RBC-ENTMCNC: 30.5 GM/DL — LOW (ref 32–36)
MCV RBC AUTO: 88.1 FL — SIGNIFICANT CHANGE UP (ref 80–100)
MONOCYTES # BLD AUTO: 0.57 K/UL — SIGNIFICANT CHANGE UP (ref 0–0.9)
MONOCYTES NFR BLD AUTO: 5.7 % — SIGNIFICANT CHANGE UP (ref 2–14)
NEUTROPHILS # BLD AUTO: 7.09 K/UL — SIGNIFICANT CHANGE UP (ref 1.8–7.4)
NEUTROPHILS NFR BLD AUTO: 70.8 % — SIGNIFICANT CHANGE UP (ref 43–77)
PLATELET # BLD AUTO: 316 K/UL — SIGNIFICANT CHANGE UP (ref 150–400)
POTASSIUM SERPL-MCNC: 3.7 MMOL/L — SIGNIFICANT CHANGE UP (ref 3.5–5.3)
POTASSIUM SERPL-SCNC: 3.7 MMOL/L — SIGNIFICANT CHANGE UP (ref 3.5–5.3)
PROTHROM AB SERPL-ACNC: 38 SEC — HIGH (ref 10–13.1)
RBC # BLD: 3.02 M/UL — LOW (ref 3.8–5.2)
RBC # FLD: 17.4 % — HIGH (ref 10.3–14.5)
SODIUM SERPL-SCNC: 142 MMOL/L — SIGNIFICANT CHANGE UP (ref 135–145)
WBC # BLD: 10.01 K/UL — SIGNIFICANT CHANGE UP (ref 3.8–10.5)
WBC # FLD AUTO: 10.01 K/UL — SIGNIFICANT CHANGE UP (ref 3.8–10.5)

## 2018-05-17 PROCEDURE — 99232 SBSQ HOSP IP/OBS MODERATE 35: CPT

## 2018-05-17 RX ORDER — GABAPENTIN 400 MG/1
200 CAPSULE ORAL THREE TIMES A DAY
Qty: 0 | Refills: 0 | Status: DISCONTINUED | OUTPATIENT
Start: 2018-05-17 | End: 2018-06-06

## 2018-05-17 RX ORDER — ACETAMINOPHEN 500 MG
650 TABLET ORAL EVERY 6 HOURS
Qty: 0 | Refills: 0 | Status: DISCONTINUED | OUTPATIENT
Start: 2018-05-17 | End: 2018-06-06

## 2018-05-17 RX ORDER — HYDROMORPHONE HYDROCHLORIDE 2 MG/ML
4 INJECTION INTRAMUSCULAR; INTRAVENOUS; SUBCUTANEOUS EVERY 4 HOURS
Qty: 0 | Refills: 0 | Status: DISCONTINUED | OUTPATIENT
Start: 2018-05-17 | End: 2018-05-23

## 2018-05-17 RX ORDER — HYDROMORPHONE HYDROCHLORIDE 2 MG/ML
0.5 INJECTION INTRAMUSCULAR; INTRAVENOUS; SUBCUTANEOUS ONCE
Qty: 0 | Refills: 0 | Status: DISCONTINUED | OUTPATIENT
Start: 2018-05-17 | End: 2018-05-19

## 2018-05-17 RX ORDER — HYDROMORPHONE HYDROCHLORIDE 2 MG/ML
2 INJECTION INTRAMUSCULAR; INTRAVENOUS; SUBCUTANEOUS EVERY 4 HOURS
Qty: 0 | Refills: 0 | Status: DISCONTINUED | OUTPATIENT
Start: 2018-05-17 | End: 2018-05-23

## 2018-05-17 RX ADMIN — GABAPENTIN 200 MILLIGRAM(S): 400 CAPSULE ORAL at 21:15

## 2018-05-17 RX ADMIN — Medication 1 TABLET(S): at 13:16

## 2018-05-17 RX ADMIN — Medication 81 MILLIGRAM(S): at 13:16

## 2018-05-17 RX ADMIN — Medication 100 MILLIGRAM(S): at 13:16

## 2018-05-17 RX ADMIN — HYDROMORPHONE HYDROCHLORIDE 4 MILLIGRAM(S): 2 INJECTION INTRAMUSCULAR; INTRAVENOUS; SUBCUTANEOUS at 10:40

## 2018-05-17 RX ADMIN — Medication 650 MILLIGRAM(S): at 06:20

## 2018-05-17 RX ADMIN — Medication 500000 UNIT(S): at 13:15

## 2018-05-17 RX ADMIN — Medication 650 MILLIGRAM(S): at 05:49

## 2018-05-17 RX ADMIN — HYDROMORPHONE HYDROCHLORIDE 0.5 MILLIGRAM(S): 2 INJECTION INTRAMUSCULAR; INTRAVENOUS; SUBCUTANEOUS at 06:00

## 2018-05-17 RX ADMIN — CLOTRIMAZOLE AND BETAMETHASONE DIPROPIONATE 1 APPLICATION(S): 10; .5 CREAM TOPICAL at 05:52

## 2018-05-17 RX ADMIN — Medication 100 MILLIGRAM(S): at 05:49

## 2018-05-17 RX ADMIN — PANTOPRAZOLE SODIUM 40 MILLIGRAM(S): 20 TABLET, DELAYED RELEASE ORAL at 05:49

## 2018-05-17 RX ADMIN — Medication 1 APPLICATION(S): at 18:15

## 2018-05-17 RX ADMIN — Medication 1 MILLIGRAM(S): at 13:16

## 2018-05-17 RX ADMIN — Medication 650 MILLIGRAM(S): at 14:33

## 2018-05-17 RX ADMIN — Medication 500000 UNIT(S): at 18:14

## 2018-05-17 RX ADMIN — HYDROMORPHONE HYDROCHLORIDE 4 MILLIGRAM(S): 2 INJECTION INTRAMUSCULAR; INTRAVENOUS; SUBCUTANEOUS at 21:45

## 2018-05-17 RX ADMIN — Medication 500000 UNIT(S): at 05:51

## 2018-05-17 RX ADMIN — HYDROMORPHONE HYDROCHLORIDE 4 MILLIGRAM(S): 2 INJECTION INTRAMUSCULAR; INTRAVENOUS; SUBCUTANEOUS at 14:33

## 2018-05-17 RX ADMIN — Medication 100 MILLIGRAM(S): at 00:23

## 2018-05-17 RX ADMIN — HYDROMORPHONE HYDROCHLORIDE 0.5 MILLIGRAM(S): 2 INJECTION INTRAMUSCULAR; INTRAVENOUS; SUBCUTANEOUS at 05:44

## 2018-05-17 RX ADMIN — Medication 500 MILLIGRAM(S): at 13:16

## 2018-05-17 RX ADMIN — MINOCYCLINE HYDROCHLORIDE 100 MILLIGRAM(S): 45 TABLET, EXTENDED RELEASE ORAL at 05:49

## 2018-05-17 RX ADMIN — MINOCYCLINE HYDROCHLORIDE 100 MILLIGRAM(S): 45 TABLET, EXTENDED RELEASE ORAL at 18:14

## 2018-05-17 RX ADMIN — Medication 650 MILLIGRAM(S): at 00:25

## 2018-05-17 RX ADMIN — Medication 3 MILLIGRAM(S): at 00:23

## 2018-05-17 RX ADMIN — Medication 3 MILLIGRAM(S): at 21:15

## 2018-05-17 RX ADMIN — HYDROMORPHONE HYDROCHLORIDE 4 MILLIGRAM(S): 2 INJECTION INTRAMUSCULAR; INTRAVENOUS; SUBCUTANEOUS at 13:38

## 2018-05-17 RX ADMIN — GABAPENTIN 200 MILLIGRAM(S): 400 CAPSULE ORAL at 13:20

## 2018-05-17 RX ADMIN — BUDESONIDE AND FORMOTEROL FUMARATE DIHYDRATE 2 PUFF(S): 160; 4.5 AEROSOL RESPIRATORY (INHALATION) at 18:15

## 2018-05-17 RX ADMIN — Medication 325 MILLIGRAM(S): at 13:16

## 2018-05-17 RX ADMIN — TIOTROPIUM BROMIDE 1 CAPSULE(S): 18 CAPSULE ORAL; RESPIRATORY (INHALATION) at 13:16

## 2018-05-17 RX ADMIN — Medication 650 MILLIGRAM(S): at 13:15

## 2018-05-17 RX ADMIN — HYDROMORPHONE HYDROCHLORIDE 4 MILLIGRAM(S): 2 INJECTION INTRAMUSCULAR; INTRAVENOUS; SUBCUTANEOUS at 10:10

## 2018-05-17 RX ADMIN — BUDESONIDE AND FORMOTEROL FUMARATE DIHYDRATE 2 PUFF(S): 160; 4.5 AEROSOL RESPIRATORY (INHALATION) at 05:49

## 2018-05-17 RX ADMIN — HYDROMORPHONE HYDROCHLORIDE 4 MILLIGRAM(S): 2 INJECTION INTRAMUSCULAR; INTRAVENOUS; SUBCUTANEOUS at 21:15

## 2018-05-17 RX ADMIN — ZINC OXIDE 1 APPLICATION(S): 200 OINTMENT TOPICAL at 13:20

## 2018-05-17 RX ADMIN — Medication 100 MILLIGRAM(S): at 21:15

## 2018-05-17 RX ADMIN — POLYETHYLENE GLYCOL 3350 17 GRAM(S): 17 POWDER, FOR SOLUTION ORAL at 13:16

## 2018-05-17 RX ADMIN — Medication 1 APPLICATION(S): at 06:00

## 2018-05-17 RX ADMIN — CLOTRIMAZOLE AND BETAMETHASONE DIPROPIONATE 1 APPLICATION(S): 10; .5 CREAM TOPICAL at 18:15

## 2018-05-17 NOTE — PROGRESS NOTE ADULT - SUBJECTIVE AND OBJECTIVE BOX
Pt is a 77 year old female who was admitted several weeks ago with aspiration pneumonia and fever with right knee pain. She also had proximal thigh swelling and there was a hematoma of the leg as well. She has a history of PMR on steroids. She also has a history of tavr, AICD andf remote bilateral TKR and was found to have strep bacteremia and a ERIKA was negative and she also had a eschar of the right knee as well. She was placed on iv antibiotics and I understand that she will be completing the antibiotics, daptomycin on this date. She told me that she had anemia in the distant past and was placed on antibiotics. She is being seen by the plastics service as well. A ct of th femur was not remarkable for infection and a ct of the chest done several weeks ago showed bibasilar PNA. She was also noted to have a left renal lesion that is unchanged now since 12/2016.     The consult was requested to see if there is anything we can do for her anemia. The counts on the day of this admission was white cell count of 9.71 hemoglobin 8.4 hematocrit 26.4 MCV 86 and MCHC 31.8 and platelet count of [id number] the diff count was 74 polys 15 lymphs and 7 monos the bun was 9 and the creatinine was 0.6     5/4 the pt is stable and the iron studies are still pending at this time. The hemoglobin was 8.4. 5/7 the pt was seen and the notes over charu last few days. The pt will in the am have ferritin and iron studies sent off. 5/8 the ferritin came back as over 300 and therefore she is not iron deficient and the anemia is consistent with chronic disease. The use of epogen might be considered here if hemoglobin drops and can be implemented to reduce blood transfusions. 5/9 pt clearly looks better and repeat hemoglobin was stable at 8.1  PE weak appearing with female and needed help to do normal daily activities large eschar as described on the lower leg and at the time of my arrival, the wound vac was in place and she was being cared for by the staff.  5/8 the ferritin came back at over 300 and therefore she is not iron deficient and epo can be considered if the hemoglobin drops and is in need of blood support. 5/10 pt being evaluated for rehab and consider epo of hemoglobin drops below the 8 range. 5/11 notes reviewed and will order cbc on the weekend and decide on epo.  pmh as above in the body of the report  5/14 the hemoglobin remains at 8.1 and debridement and wound vac and abdominal wound and thigh wounds were being cared for. 5/15 stable clinically and looks better the hemoglobin without epo is up to 8.5 5/16 notes and labs reviewed and no change from heme point of view.     5/17 hemoglobin down to 8.1 and stable clinically.                        8.1    10.01 )-----------( 316      ( 17 May 2018 07:49 )             26.6              PE pt being cared for by the staff   T(C): 36.8 (14 May 2018 14:06), Max: 36.9 (14 May 2018 06:16)  T(F): 98.3 (14 May 2018 14:06), Max: 98.5 (14 May 2018 06:16)  HR: 78 (14 May 2018 14:06) (78 - 100)  BP: 148/81 (14 May 2018 14:06) (123/63 - 148/81)  BP(mean): --  RR: 18 (14 May 2018 14:06) (18 - 18)  SpO2: 93% (14 May 2018 14:06) (93% - 99%)  MEDICATIONS  (STANDING):  acetaminophen   Tablet. 650 milliGRAM(s) Oral every 8 hours  ascorbic acid 500 milliGRAM(s) Oral daily  aspirin enteric coated 81 milliGRAM(s) Oral daily  buDESOnide 160 MICROgram(s)/formoterol 4.5 MICROgram(s) Inhaler 2 Puff(s) Inhalation two times a day  clotrimazole/betamethasone Cream 1 Application(s) Topical two times a day  docusate sodium 100 milliGRAM(s) Oral three times a day  ferrous    sulfate 325 milliGRAM(s) Oral daily  folic acid 1 milliGRAM(s) Oral daily  melatonin 3 milliGRAM(s) Oral at bedtime  minocycline 100 milliGRAM(s) Oral two times a day  multivitamin 1 Tablet(s) Oral daily  nystatin    Suspension 861978 Unit(s) Oral every 6 hours  nystatin Powder 1 Application(s) Topical two times a day  pantoprazole    Tablet 40 milliGRAM(s) Oral before breakfast  polyethylene glycol 3350 17 Gram(s) Oral daily  povidone iodine 10% Solution 1 Application(s) Topical every 12 hours  tiotropium 18 MICROgram(s) Capsule 1 Capsule(s) Inhalation daily  zinc oxide 20% Ointment 1 Application(s) Topical daily

## 2018-05-17 NOTE — PROGRESS NOTE ADULT - SUBJECTIVE AND OBJECTIVE BOX
PLASTIC SURGERY DAILY PROGRESS NOTE:    SUBJECTIVE:  Patient seen and examined. No acute events overnight.     OBJECTIVE:   Vital Signs Last 24 Hrs  T(C): 36.4 (17 May 2018 05:42), Max: 36.6 (16 May 2018 10:40)  T(F): 97.6 (17 May 2018 05:42), Max: 97.9 (16 May 2018 10:40)  HR: 98 (17 May 2018 05:42) (73 - 98)  BP: 130/83 (17 May 2018 05:42) (115/71 - 137/84)  BP(mean): --  RR: 18 (17 May 2018 05:42) (18 - 18)  SpO2: 96% (17 May 2018 05:42) (95% - 98%)    I&O's Detail    16 May 2018 07:01  -  17 May 2018 07:00  --------------------------------------------------------  IN:    Oral Fluid: 1180 mL  Total IN: 1180 mL    OUT:    Voided: 250 mL  Total OUT: 250 mL    Total NET: 930 mL    MEDICATIONS  (STANDING):  acetaminophen   Tablet. 650 milliGRAM(s) Oral every 8 hours  ascorbic acid 500 milliGRAM(s) Oral daily  aspirin enteric coated 81 milliGRAM(s) Oral daily  buDESOnide 160 MICROgram(s)/formoterol 4.5 MICROgram(s) Inhaler 2 Puff(s) Inhalation two times a day  clotrimazole/betamethasone Cream 1 Application(s) Topical two times a day  docusate sodium 100 milliGRAM(s) Oral three times a day  ferrous    sulfate 325 milliGRAM(s) Oral daily  folic acid 1 milliGRAM(s) Oral daily  melatonin 3 milliGRAM(s) Oral at bedtime  minocycline 100 milliGRAM(s) Oral two times a day  multivitamin 1 Tablet(s) Oral daily  nystatin    Suspension 031609 Unit(s) Oral every 6 hours  pantoprazole    Tablet 40 milliGRAM(s) Oral before breakfast  polyethylene glycol 3350 17 Gram(s) Oral daily  povidone iodine 10% Solution 1 Application(s) Topical every 12 hours  tiotropium 18 MICROgram(s) Capsule 1 Capsule(s) Inhalation daily  zinc oxide 20% Ointment 1 Application(s) Topical daily    MEDICATIONS  (PRN):  acetaminophen   Tablet 650 milliGRAM(s) Oral every 6 hours PRN For Temp greater than 38 C (100.4 F)  bisacodyl Suppository 10 milliGRAM(s) Rectal daily PRN Constipation  HYDROmorphone   Tablet 2 milliGRAM(s) Oral every 4 hours PRN Severe Pain (7 - 10)  HYDROmorphone  Injectable 0.5 milliGRAM(s) IV Push daily PRN Dressing change  lidocaine 2% Gel 1 Application(s) Topical daily PRN dressing change  lidocaine 2% Viscous 10 milliLiter(s) Swish and Spit every 6 hours PRN Mouth Sores  senna 2 Tablet(s) Oral at bedtime PRN Constipation    ** PHYSICAL EXAM **  -- CONSTITUTIONAL: AOx3. NAD.   -- ABDOMEN: wound with NPWT, holding suction  -- EXTREMITIES: The right thigh wound has a VAC in place. Stable eschar overlying patella, painted with betadine.  Medial wound with kathrin.

## 2018-05-17 NOTE — PROGRESS NOTE ADULT - ASSESSMENT
A: 77F with complex PMH/PSH who recently had placement of a right knee antibiotic spacer followed by PRS closure c/b skin necrosis and formation of an eschar over the anterior knee joint. The patient was readmitted with fevers and right thigh cellulitis which was complicated by skin breakdown. Patient also developed wound at the right/mid lower quadrant of the abdomen.    P:  >> Dressing change TID to knee  >> Betadine to eschar bid  >> VACs on abdomen and R thigh to be changed tomorrow  >> Medical optimization as per primary team.  >> DVT prophylaxis.  >> Protein supplementation of diet to encourage wound healing.   >> Discussed with Dr. Mg.     [facility] Plastic Surgery Pager -- [phone]  [facility] Plastic Surgery Pager -- [phone]

## 2018-05-17 NOTE — PROGRESS NOTE ADULT - SUBJECTIVE AND OBJECTIVE BOX
pain controlled, afebrile    abdominal wound - vac in place  RLE  thigh vac in place  surgical wound intact with stable eschar, some separation of eschar medially from skin, no exudate, dressed by prs/wound care  5/5 ta/ehl/gcs  silt l4-s1  2+ dp pulse    xr rle performed yesterday and hardware intact without any change in position of the construct

## 2018-05-17 NOTE — PROGRESS NOTE ADULT - ASSESSMENT
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     # Recent septic Rt TKR - abx spacer exchanged and plastics complex wound closure  # PMR on chronic steroids  # thigh wound   # right knee eschar, right thigh necrosis and abd wound   # Anemia    Plan:  Plastics and otho on the case. f/u appreciated.  cont local wound care as family declining surgery  s/p multiple debridements, cont wet to dry dressing abd and knee wounds TID  wound vac for thigh wound and abd wound  remains afebrile, cont suppressive minocycline, appreciate ID f/u.   appreciate psych eval  heme follow up, epo if hgb <8  cont coumadin and asa  appreciate pain management recs  PT OOB however, pt resistant, dw patient importance of turning and sitting in chair  bowel regimen

## 2018-05-17 NOTE — PROGRESS NOTE ADULT - ASSESSMENT
no plan for orthopedic surgical intervention per family and patient preference  local wound care to knee and vac therapy to abd/thigh per plastics and wound care teams  PO abx per ID team  encourage patient to spend majority of bed oob in bedside chair as able  PT to help mobilize, she must remain 50% wb on the RLE and NO KNEE MOTION allowed, knee immobilizer if oob  coumadin, inr goal 2-3  continue to optimize nutrition  continue to involve psych	  dispo planning      Nate Bass MD

## 2018-05-17 NOTE — PROGRESS NOTE ADULT - ASSESSMENT
Patient with infected right tkr s/p rudy, spacer for strept infection, had poor wound healing so returned 3 months after and had spacer exchange and complex wound closure with mrsa infection found. She is about 2 weeks into dapto and returns with fever, leukocytosis, ongoing pain and eschar of right knee wound and what appears to be a hematoma of the right thigh. INR has been up so that is good reason for the thigh findings. Must consider infected hematoma possible. UTI is possible. Pneumonia is possible given basilar rales. retroperitoneal hematoma possible .  PMR decompensation or adrenal insufficiency a consideration at well.    Pulmonary stable relative to prior admits --  wound care in progress  Completed IV ABX-5/5 -42 days  Debridement done-5/5  Vac placed 5/7-slow progress  DC planning in progress-nutrition optimization

## 2018-05-17 NOTE — CHART NOTE - NSCHARTNOTEFT_GEN_A_CORE
Follow up. Patient noted with improved intake, accepts Howard 2x/day, nepro 2cx/day plus PO intake 50% to promote wound healing.    Diet : Low sodium soft      Patient reports    No complaints      PO intake:  < 50% [ ] 50-75% [X ]   % [ ]  other :     Source for PO intake [X ] Patient [ ] family [ ] chart [X ] staff [X ] other  personal HHA     Enteral /Parenteral Nutrition: NA      Current Weight:   % Weight Change    Pertinent Medications: MEDICATIONS  (STANDING):  ascorbic acid 500 milliGRAM(s) Oral daily  aspirin enteric coated 81 milliGRAM(s) Oral daily  buDESOnide 160 MICROgram(s)/formoterol 4.5 MICROgram(s) Inhaler 2 Puff(s) Inhalation two times a day  clotrimazole/betamethasone Cream 1 Application(s) Topical two times a day  docusate sodium 100 milliGRAM(s) Oral three times a day  ferrous    sulfate 325 milliGRAM(s) Oral daily  folic acid 1 milliGRAM(s) Oral daily  gabapentin 200 milliGRAM(s) Oral three times a day  HYDROmorphone  Injectable 0.5 milliGRAM(s) IV Push once  melatonin 3 milliGRAM(s) Oral at bedtime  minocycline 100 milliGRAM(s) Oral two times a day  multivitamin 1 Tablet(s) Oral daily  nystatin    Suspension 380422 Unit(s) Oral every 6 hours  pantoprazole    Tablet 40 milliGRAM(s) Oral before breakfast  polyethylene glycol 3350 17 Gram(s) Oral daily  povidone iodine 10% Solution 1 Application(s) Topical every 12 hours  tiotropium 18 MICROgram(s) Capsule 1 Capsule(s) Inhalation daily  zinc oxide 20% Ointment 1 Application(s) Topical daily    MEDICATIONS  (PRN):  acetaminophen   Tablet 650 milliGRAM(s) Oral every 6 hours PRN For Temp greater than 38 C (100.4 F)  acetaminophen   Tablet. 650 milliGRAM(s) Oral every 6 hours PRN Mild Pain (1 - 3)  bisacodyl Suppository 10 milliGRAM(s) Rectal daily PRN Constipation  HYDROmorphone   Tablet 4 milliGRAM(s) Oral every 4 hours PRN Severe Pain (7 - 10)  HYDROmorphone   Tablet 2 milliGRAM(s) Oral every 4 hours PRN Moderate Pain (4 - 6)  HYDROmorphone  Injectable 0.5 milliGRAM(s) IV Push daily PRN Dressing change  lidocaine 2% Gel 1 Application(s) Topical daily PRN dressing change  lidocaine 2% Viscous 10 milliLiter(s) Swish and Spit every 6 hours PRN Mouth Sores  senna 2 Tablet(s) Oral at bedtime PRN Constipation    Pertinent Labs:  05-17 Na142 mmol/L Glu 131 mg/dL<H> K+ 3.7 mmol/L Cr  0.61 mg/dL BUN 14 mg/dL      Skin: abdominal wall wound with vac, followed by wound care      Estimated Needs:   [X ] no change since previous assessment  [ ] recalculated:       Previous Nutrition Diagnosis:       [X ] Increased Nutrient Needs [ ] Overweight/Obesity            Nutrition Diagnosis is [X ] ongoing  [ ] resolved [ ] not applicable            Recommend    [ ] Change Diet To:    [X ] Nutrition Supplement  continue nepro x2 daily, howard x2 daily  [ ] Nutrition Support    [ ] Other:        Monitoring and Evaluation:     [X ] PO intake [X ] Tolerance to diet prescription [X ] weights [X ] follow up per protocol    [ ] other:

## 2018-05-17 NOTE — PROGRESS NOTE ADULT - ASSESSMENT
5/16 if hemoglobin drops below 8 consider use of epo to decrease transfusion requirements         5/17 hemoglobin down to 8.1 and stable clinically.

## 2018-05-17 NOTE — CHART NOTE - NSCHARTNOTEFT_GEN_A_CORE
follow up-as per pain management recommendation , pain medication adjusted as per d/w Md;  d/w patient medication/side effects;  Joya Wallace(NP)  [address], [phone]

## 2018-05-17 NOTE — PROGRESS NOTE ADULT - ATTENDING COMMENTS
as above- Wound care and nutrition appear to be primary issues --consequent Psych breakdown.  Pulm relatively stable-atelectasis, prior PE, asthma, cardiac Dz  Inhaler regimen as outlined above    ID follow up of ABX IV completed 5/5--then PO minocycline  (debridement done 5/5)--following WBC   PT evaluation and Psych evaluation    ortho/plastics follow up-knee immobilizer/PT--advance ambulation as able; Nutrition follow up  Rehab pending-pulm. cleared for DC to rehab.    Everett Kumar MD-Pulmonary   [phone] as above- Wound care and nutrition appear to be primary issues --consequent Psych breakdown.  Pulm relatively stable-atelectasis, prior PE, asthma, cardiac Dz  Inhaler regimen as outlined above                      Heme--keep hgb above 8    ID follow up of ABX IV completed 5/5--then PO minocycline  (debridement done 5/5)--following WBC   PT evaluation and Psych evaluation    ortho/plastics follow up-knee immobilizer/PT--advance ambulation as able; Nutrition follow up  Rehab pending-pulm. cleared for DC to rehab (no new events 5/16)    Everett Kumar MD-Pulmonary   [phone]

## 2018-05-17 NOTE — PROGRESS NOTE ADULT - SUBJECTIVE AND OBJECTIVE BOX
CHIEF COMPLAINT:    Interval Events:    REVIEW OF SYSTEMS:  Constitutional: No fevers or chills. No weight loss. No fatigue or generalized malaise.  Eyes: No itching or discharge from the eyes  ENT: No ear pain. No ear discharge. No nasal congestion. No post nasal drip. No epistaxis. No throat pain. No sore throat. No difficulty swallowing.   CV: No chest pain. No palpitations. No lightheadedness or dizziness.   Resp: No dyspnea at rest. No dyspnea on exertion. No orthopnea. No wheezing. No cough. No stridor. No sputum production. No chest pain with respiration.  GI: No nausea. No vomiting. No diarrhea.  MSK: No joint pain or pain in any extremities  Integumentary: No skin lesions. No pedal edema.  Neurological: No gross motor weakness. No sensory changes.  [ ] All other systems negative  [ ] Unable to assess ROS because ________    OBJECTIVE:  ICU Vital Signs Last 24 Hrs  T(C): 36.3 (17 May 2018 01:13), Max: 36.6 (16 May 2018 10:40)  T(F): 97.4 (17 May 2018 01:13), Max: 97.9 (16 May 2018 10:40)  HR: 75 (17 May 2018 01:13) (73 - 98)  BP: 124/60 (17 May 2018 01:13) (115/71 - 151/77)  BP(mean): --  ABP: --  ABP(mean): --  RR: 18 (17 May 2018 01:13) (18 - 18)  SpO2: 95% (17 May 2018 01:13) (95% - 98%)        05-15 @ 07:01  -  05-16 @ 07:00  --------------------------------------------------------  IN: 240 mL / OUT: 0 mL / NET: 240 mL    05-16 @ 07:01  -  05-17 @ 05:09  --------------------------------------------------------  IN: 1180 mL / OUT: 250 mL / NET: 930 mL      CAPILLARY BLOOD GLUCOSE          PHYSICAL EXAM:  General: Awake, alert, oriented X 3.   HEENT: Atraumatic, normocephalic.                 Mallampatti Grade                 No nasal congestion.                No tonsillar or pharyngeal exudates.  Lymph Nodes: No palpable lymphadenopathy  Neck: No JVD. No carotid bruit.   Respiratory: Normal chest expansion                         Normal percussion                         Normal and equal air entry                         No wheeze, rhonchi or rales.  Cardiovascular: S1 S2 normal. No murmurs, rubs or gallops.   Abdomen: Soft, non-tender, non-distended. No organomegaly.  Extremities: Warm to touch. Peripheral pulse palpable. No pedal edema.   Skin: No rashes or skin lesions  Neurological: Motor and sensory examination equal and normal in all four extremities.  Psychiatry: Appropriate mood and affect.    HOSPITAL MEDICATIONS:  MEDICATIONS  (STANDING):  acetaminophen   Tablet. 650 milliGRAM(s) Oral every 8 hours  ascorbic acid 500 milliGRAM(s) Oral daily  aspirin enteric coated 81 milliGRAM(s) Oral daily  buDESOnide 160 MICROgram(s)/formoterol 4.5 MICROgram(s) Inhaler 2 Puff(s) Inhalation two times a day  clotrimazole/betamethasone Cream 1 Application(s) Topical two times a day  docusate sodium 100 milliGRAM(s) Oral three times a day  ferrous    sulfate 325 milliGRAM(s) Oral daily  folic acid 1 milliGRAM(s) Oral daily  melatonin 3 milliGRAM(s) Oral at bedtime  minocycline 100 milliGRAM(s) Oral two times a day  multivitamin 1 Tablet(s) Oral daily  nystatin    Suspension 031014 Unit(s) Oral every 6 hours  nystatin Powder 1 Application(s) Topical two times a day  pantoprazole    Tablet 40 milliGRAM(s) Oral before breakfast  polyethylene glycol 3350 17 Gram(s) Oral daily  povidone iodine 10% Solution 1 Application(s) Topical every 12 hours  tiotropium 18 MICROgram(s) Capsule 1 Capsule(s) Inhalation daily  zinc oxide 20% Ointment 1 Application(s) Topical daily    MEDICATIONS  (PRN):  acetaminophen   Tablet 650 milliGRAM(s) Oral every 6 hours PRN For Temp greater than 38 C (100.4 F)  bisacodyl Suppository 10 milliGRAM(s) Rectal daily PRN Constipation  HYDROmorphone   Tablet 2 milliGRAM(s) Oral every 4 hours PRN Severe Pain (7 - 10)  HYDROmorphone  Injectable 0.5 milliGRAM(s) IV Push daily PRN Dressing change  lidocaine 2% Gel 1 Application(s) Topical daily PRN dressing change  lidocaine 2% Viscous 10 milliLiter(s) Swish and Spit every 6 hours PRN Mouth Sores  senna 2 Tablet(s) Oral at bedtime PRN Constipation      LABS:                        8.5    9.44  )-----------( 291      ( 15 May 2018 07:37 )             27.7     05-15    139  |  99  |  14  ----------------------------<  106<H>  3.7   |  30  |  0.61    Ca    9.7      15 May 2018 07:07  Mg     1.6     05-16      PT/INR - ( 16 May 2018 07:48 )   PT: 36.7 sec;   INR: 3.17 ratio                   MICROBIOLOGY:     RADIOLOGY:  [ ] Reviewed and interpreted by me    Point of Care Ultrasound Findings:    PFT:    EKG: CHIEF COMPLAINT: no real sob, no BM yesterday--wants to leave    Interval Events: OOB-plastics/ortho f/up    REVIEW OF SYSTEMS:  Constitutional: No fevers or chills. No weight loss. + fatigue or generalized malaise.  Eyes: No itching or discharge from the eyes  ENT: No ear pain. No ear discharge. No nasal congestion. No post nasal drip. No epistaxis. No throat pain. No sore throat. No difficulty swallowing.   CV: No chest pain. No palpitations. No lightheadedness or dizziness.   Resp: No dyspnea at rest. + dyspnea on exertion. No orthopnea. No wheezing. No cough. No stridor. No sputum production. No chest pain with respiration.  GI: No nausea. No vomiting. No diarrhea.  MSK: No joint pain or pain in any extremities--except RLL  Integumentary: No skin lesions. No pedal edema.  Neurological: No gross motor weakness. No sensory changes.  [+ ] All other systems negative  [ ] Unable to assess ROS because ________    OBJECTIVE:  ICU Vital Signs Last 24 Hrs  T(C): 36.3 (17 May 2018 01:13), Max: 36.6 (16 May 2018 10:40)  T(F): 97.4 (17 May 2018 01:13), Max: 97.9 (16 May 2018 10:40)  HR: 75 (17 May 2018 01:13) (73 - 98)  BP: 124/60 (17 May 2018 01:13) (115/71 - 151/77)  BP(mean): --  ABP: --  ABP(mean): --  RR: 18 (17 May 2018 01:13) (18 - 18)  SpO2: 95% (17 May 2018 01:13) (95% - 98%)        05-15 @ 07:01  -  05-16 @ 07:00  --------------------------------------------------------  IN: 240 mL / OUT: 0 mL / NET: 240 mL    05-16 @ 07:01  -  05-17 @ 05:09  --------------------------------------------------------  IN: 1180 mL / OUT: 250 mL / NET: 930 mL      CAPILLARY BLOOD GLUCOSE          PHYSICAL EXAM: NAD in bed  General: Awake, alert, oriented X 3.   HEENT: Atraumatic, normocephalic.                 Mallampatti Grade 3                No nasal congestion.                No tonsillar or pharyngeal exudates.  Lymph Nodes: No palpable lymphadenopathy  Neck: No JVD. No carotid bruit.   Respiratory: abnormal chest expansion-reduced at bases                         Normal percussion                         Normal and equal air entry                         No wheeze, rhonchi or rales.  Cardiovascular: S1 S2 normal. No murmurs, rubs or gallops.   Abdomen: Soft, non-tender, non-distended. No organomegaly.  Extremities: Warm to touch. Peripheral pulse palpable. No pedal edema. wound vac in place RLE  Skin: No rashes or skin lesions  Neurological: Motor and sensory examination equal and normal in all four extremities.  Psychiatry: Appropriate mood and affect.    HOSPITAL MEDICATIONS:  MEDICATIONS  (STANDING):  acetaminophen   Tablet. 650 milliGRAM(s) Oral every 8 hours  ascorbic acid 500 milliGRAM(s) Oral daily  aspirin enteric coated 81 milliGRAM(s) Oral daily  buDESOnide 160 MICROgram(s)/formoterol 4.5 MICROgram(s) Inhaler 2 Puff(s) Inhalation two times a day  clotrimazole/betamethasone Cream 1 Application(s) Topical two times a day  docusate sodium 100 milliGRAM(s) Oral three times a day  ferrous    sulfate 325 milliGRAM(s) Oral daily  folic acid 1 milliGRAM(s) Oral daily  melatonin 3 milliGRAM(s) Oral at bedtime  minocycline 100 milliGRAM(s) Oral two times a day  multivitamin 1 Tablet(s) Oral daily  nystatin    Suspension 599263 Unit(s) Oral every 6 hours  nystatin Powder 1 Application(s) Topical two times a day  pantoprazole    Tablet 40 milliGRAM(s) Oral before breakfast  polyethylene glycol 3350 17 Gram(s) Oral daily  povidone iodine 10% Solution 1 Application(s) Topical every 12 hours  tiotropium 18 MICROgram(s) Capsule 1 Capsule(s) Inhalation daily  zinc oxide 20% Ointment 1 Application(s) Topical daily    MEDICATIONS  (PRN):  acetaminophen   Tablet 650 milliGRAM(s) Oral every 6 hours PRN For Temp greater than 38 C (100.4 F)  bisacodyl Suppository 10 milliGRAM(s) Rectal daily PRN Constipation  HYDROmorphone   Tablet 2 milliGRAM(s) Oral every 4 hours PRN Severe Pain (7 - 10)  HYDROmorphone  Injectable 0.5 milliGRAM(s) IV Push daily PRN Dressing change  lidocaine 2% Gel 1 Application(s) Topical daily PRN dressing change  lidocaine 2% Viscous 10 milliLiter(s) Swish and Spit every 6 hours PRN Mouth Sores  senna 2 Tablet(s) Oral at bedtime PRN Constipation      LABS:                        8.5    9.44  )-----------( 291      ( 15 May 2018 07:37 )             27.7     05-15    139  |  99  |  14  ----------------------------<  106<H>  3.7   |  30  |  0.61    Ca    9.7      15 May 2018 07:07  Mg     1.6     05-16      PT/INR - ( 16 May 2018 07:48 )   PT: 36.7 sec;   INR: 3.17 ratio                   MICROBIOLOGY:     RADIOLOGY:  [ ] Reviewed and interpreted by me    Point of Care Ultrasound Findings:    PFT:    EKG:

## 2018-05-17 NOTE — PROGRESS NOTE ADULT - SUBJECTIVE AND OBJECTIVE BOX
Patient is a 77y old  Female who presents with a chief complaint of fever (11 Apr 2018 14:25)      SUBJECTIVE / OVERNIGHT EVENTS:    Patient seen and examined. states today is a good day. denies cp sob.       Vital Signs Last 24 Hrs  T(C): 36.4 (17 May 2018 05:42), Max: 36.6 (16 May 2018 21:32)  T(F): 97.6 (17 May 2018 05:42), Max: 97.9 (16 May 2018 21:32)  HR: 98 (17 May 2018 05:42) (75 - 98)  BP: 130/83 (17 May 2018 05:42) (115/71 - 130/83)  BP(mean): --  RR: 18 (17 May 2018 05:42) (18 - 18)  SpO2: 96% (17 May 2018 05:42) (95% - 98%)  I&O's Summary    16 May 2018 07:01  -  17 May 2018 07:00  --------------------------------------------------------  IN: 1180 mL / OUT: 250 mL / NET: 930 mL        PE:  GENERAL: NAD, AAOx3, obese  HEAD:  Atraumatic, Normocephalic  EYES: EOMI, PERRLA, conjunctiva and sclera clear  NECK: Supple, No JVD  CHEST/LUNG: CTABL, No wheeze  HEART: Regular rate and rhythm; no murmur  ABDOMEN: Soft, Nontender, Nondistended; abd wound with vac  EXTREMITIES:  2+ Peripheral Pulses, right upper thigh with vac, right knee dressed  SKIN: No rashes or lesions  NEURO: No focal deficits    LABS:                        8.1    10.01 )-----------( 316      ( 17 May 2018 07:49 )             26.6     05-17    142  |  101  |  14  ----------------------------<  131<H>  3.7   |  31  |  0.61    Ca    9.3      17 May 2018 07:04  Mg     1.6     05-16      PT/INR - ( 17 May 2018 07:49 )   PT: 38.0 sec;   INR: 3.28 ratio           CAPILLARY BLOOD GLUCOSE                RADIOLOGY & ADDITIONAL TESTS:    Imaging Personally Reviewed:  [x] YES  [ ] NO    Consultant(s) Notes Reviewed:  [x] YES  [ ] NO    MEDICATIONS  (STANDING):  ascorbic acid 500 milliGRAM(s) Oral daily  aspirin enteric coated 81 milliGRAM(s) Oral daily  buDESOnide 160 MICROgram(s)/formoterol 4.5 MICROgram(s) Inhaler 2 Puff(s) Inhalation two times a day  clotrimazole/betamethasone Cream 1 Application(s) Topical two times a day  docusate sodium 100 milliGRAM(s) Oral three times a day  ferrous    sulfate 325 milliGRAM(s) Oral daily  folic acid 1 milliGRAM(s) Oral daily  gabapentin 200 milliGRAM(s) Oral three times a day  HYDROmorphone  Injectable 0.5 milliGRAM(s) IV Push once  melatonin 3 milliGRAM(s) Oral at bedtime  minocycline 100 milliGRAM(s) Oral two times a day  multivitamin 1 Tablet(s) Oral daily  nystatin    Suspension 333557 Unit(s) Oral every 6 hours  pantoprazole    Tablet 40 milliGRAM(s) Oral before breakfast  polyethylene glycol 3350 17 Gram(s) Oral daily  povidone iodine 10% Solution 1 Application(s) Topical every 12 hours  tiotropium 18 MICROgram(s) Capsule 1 Capsule(s) Inhalation daily  zinc oxide 20% Ointment 1 Application(s) Topical daily    MEDICATIONS  (PRN):  acetaminophen   Tablet 650 milliGRAM(s) Oral every 6 hours PRN For Temp greater than 38 C (100.4 F)  acetaminophen   Tablet. 650 milliGRAM(s) Oral every 6 hours PRN Mild Pain (1 - 3)  bisacodyl Suppository 10 milliGRAM(s) Rectal daily PRN Constipation  HYDROmorphone   Tablet 4 milliGRAM(s) Oral every 4 hours PRN Severe Pain (7 - 10)  HYDROmorphone   Tablet 2 milliGRAM(s) Oral every 4 hours PRN Moderate Pain (4 - 6)  HYDROmorphone  Injectable 0.5 milliGRAM(s) IV Push daily PRN Dressing change  lidocaine 2% Gel 1 Application(s) Topical daily PRN dressing change  lidocaine 2% Viscous 10 milliLiter(s) Swish and Spit every 6 hours PRN Mouth Sores  senna 2 Tablet(s) Oral at bedtime PRN Constipation      Care Discussed with Consultants/Other Providers [x] YES  [ ] NO    HEALTH ISSUES - PROBLEM Dx:  Pain: Pain  Adjustment disorder, unspecified type  Delirium due to another medical condition  Pneumonia: Pneumonia  Aortic stenosis, severe: Aortic stenosis, severe  MYAH (obstructive sleep apnea): MYAH (obstructive sleep apnea)  Obesity: Obesity  Asthma: Asthma  SOB (shortness of breath): SOB (shortness of breath)  Chronic deep vein thrombosis (DVT) of lower extremity, unspecified laterality, unspecified vein: Chronic deep vein thrombosis (DVT) of lower extremity, unspecified laterality, unspecified vein  Arthralgia of knee, unspecified laterality: Arthralgia of knee, unspecified laterality  Hyperlipidemia, unspecified hyperlipidemia type: Hyperlipidemia, unspecified hyperlipidemia type  Asthma, unspecified asthma severity, unspecified whether complicated, unspecified whether persistent: Asthma, unspecified asthma severity, unspecified whether complicated, unspecified whether persistent  Coronary artery disease involving native coronary artery of native heart without angina pectoris: Coronary artery disease involving native coronary artery of native heart without angina pectoris  Gastroesophageal reflux disease, esophagitis presence not specified: Gastroesophageal reflux disease, esophagitis presence not specified  Fever, unspecified fever cause: Fever, unspecified fever cause

## 2018-05-18 LAB
INR BLD: 3.25 RATIO — HIGH (ref 0.88–1.16)
PROTHROM AB SERPL-ACNC: 37.6 SEC — HIGH (ref 10–13.1)

## 2018-05-18 PROCEDURE — 99232 SBSQ HOSP IP/OBS MODERATE 35: CPT

## 2018-05-18 RX ORDER — HYDROMORPHONE HYDROCHLORIDE 2 MG/ML
0.5 INJECTION INTRAMUSCULAR; INTRAVENOUS; SUBCUTANEOUS DAILY
Qty: 0 | Refills: 0 | Status: DISCONTINUED | OUTPATIENT
Start: 2018-05-18 | End: 2018-05-23

## 2018-05-18 RX ADMIN — HYDROMORPHONE HYDROCHLORIDE 4 MILLIGRAM(S): 2 INJECTION INTRAMUSCULAR; INTRAVENOUS; SUBCUTANEOUS at 21:07

## 2018-05-18 RX ADMIN — POLYETHYLENE GLYCOL 3350 17 GRAM(S): 17 POWDER, FOR SOLUTION ORAL at 12:46

## 2018-05-18 RX ADMIN — CLOTRIMAZOLE AND BETAMETHASONE DIPROPIONATE 1 APPLICATION(S): 10; .5 CREAM TOPICAL at 06:20

## 2018-05-18 RX ADMIN — Medication 500000 UNIT(S): at 17:10

## 2018-05-18 RX ADMIN — Medication 100 MILLIGRAM(S): at 12:55

## 2018-05-18 RX ADMIN — Medication 1 TABLET(S): at 12:46

## 2018-05-18 RX ADMIN — HYDROMORPHONE HYDROCHLORIDE 4 MILLIGRAM(S): 2 INJECTION INTRAMUSCULAR; INTRAVENOUS; SUBCUTANEOUS at 17:10

## 2018-05-18 RX ADMIN — HYDROMORPHONE HYDROCHLORIDE 4 MILLIGRAM(S): 2 INJECTION INTRAMUSCULAR; INTRAVENOUS; SUBCUTANEOUS at 14:08

## 2018-05-18 RX ADMIN — HYDROMORPHONE HYDROCHLORIDE 4 MILLIGRAM(S): 2 INJECTION INTRAMUSCULAR; INTRAVENOUS; SUBCUTANEOUS at 13:08

## 2018-05-18 RX ADMIN — ZINC OXIDE 1 APPLICATION(S): 200 OINTMENT TOPICAL at 12:45

## 2018-05-18 RX ADMIN — Medication 500 MILLIGRAM(S): at 12:45

## 2018-05-18 RX ADMIN — Medication 1 MILLIGRAM(S): at 12:46

## 2018-05-18 RX ADMIN — CLOTRIMAZOLE AND BETAMETHASONE DIPROPIONATE 1 APPLICATION(S): 10; .5 CREAM TOPICAL at 17:10

## 2018-05-18 RX ADMIN — HYDROMORPHONE HYDROCHLORIDE 0.5 MILLIGRAM(S): 2 INJECTION INTRAMUSCULAR; INTRAVENOUS; SUBCUTANEOUS at 09:17

## 2018-05-18 RX ADMIN — HYDROMORPHONE HYDROCHLORIDE 0.5 MILLIGRAM(S): 2 INJECTION INTRAMUSCULAR; INTRAVENOUS; SUBCUTANEOUS at 09:47

## 2018-05-18 RX ADMIN — GABAPENTIN 200 MILLIGRAM(S): 400 CAPSULE ORAL at 12:45

## 2018-05-18 RX ADMIN — Medication 500000 UNIT(S): at 06:18

## 2018-05-18 RX ADMIN — HYDROMORPHONE HYDROCHLORIDE 4 MILLIGRAM(S): 2 INJECTION INTRAMUSCULAR; INTRAVENOUS; SUBCUTANEOUS at 20:37

## 2018-05-18 RX ADMIN — Medication 500000 UNIT(S): at 12:45

## 2018-05-18 RX ADMIN — HYDROMORPHONE HYDROCHLORIDE 4 MILLIGRAM(S): 2 INJECTION INTRAMUSCULAR; INTRAVENOUS; SUBCUTANEOUS at 06:18

## 2018-05-18 RX ADMIN — BUDESONIDE AND FORMOTEROL FUMARATE DIHYDRATE 2 PUFF(S): 160; 4.5 AEROSOL RESPIRATORY (INHALATION) at 17:11

## 2018-05-18 RX ADMIN — BUDESONIDE AND FORMOTEROL FUMARATE DIHYDRATE 2 PUFF(S): 160; 4.5 AEROSOL RESPIRATORY (INHALATION) at 06:19

## 2018-05-18 RX ADMIN — TIOTROPIUM BROMIDE 1 CAPSULE(S): 18 CAPSULE ORAL; RESPIRATORY (INHALATION) at 12:45

## 2018-05-18 RX ADMIN — Medication 81 MILLIGRAM(S): at 12:46

## 2018-05-18 RX ADMIN — Medication 3 MILLIGRAM(S): at 21:33

## 2018-05-18 RX ADMIN — MINOCYCLINE HYDROCHLORIDE 100 MILLIGRAM(S): 45 TABLET, EXTENDED RELEASE ORAL at 06:19

## 2018-05-18 RX ADMIN — HYDROMORPHONE HYDROCHLORIDE 4 MILLIGRAM(S): 2 INJECTION INTRAMUSCULAR; INTRAVENOUS; SUBCUTANEOUS at 06:48

## 2018-05-18 RX ADMIN — Medication 100 MILLIGRAM(S): at 21:33

## 2018-05-18 RX ADMIN — Medication 100 MILLIGRAM(S): at 06:19

## 2018-05-18 RX ADMIN — GABAPENTIN 200 MILLIGRAM(S): 400 CAPSULE ORAL at 06:19

## 2018-05-18 RX ADMIN — MINOCYCLINE HYDROCHLORIDE 100 MILLIGRAM(S): 45 TABLET, EXTENDED RELEASE ORAL at 17:10

## 2018-05-18 RX ADMIN — Medication 1 APPLICATION(S): at 17:12

## 2018-05-18 RX ADMIN — Medication 1 APPLICATION(S): at 06:20

## 2018-05-18 RX ADMIN — Medication 325 MILLIGRAM(S): at 12:46

## 2018-05-18 RX ADMIN — GABAPENTIN 200 MILLIGRAM(S): 400 CAPSULE ORAL at 21:33

## 2018-05-18 RX ADMIN — Medication 500000 UNIT(S): at 01:12

## 2018-05-18 RX ADMIN — PANTOPRAZOLE SODIUM 40 MILLIGRAM(S): 20 TABLET, DELAYED RELEASE ORAL at 06:19

## 2018-05-18 RX ADMIN — HYDROMORPHONE HYDROCHLORIDE 4 MILLIGRAM(S): 2 INJECTION INTRAMUSCULAR; INTRAVENOUS; SUBCUTANEOUS at 18:10

## 2018-05-18 NOTE — PROGRESS NOTE ADULT - ASSESSMENT
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     # Recent septic Rt TKR - abx spacer exchanged and plastics complex wound closure  # PMR on chronic steroids  # thigh wound   # right knee eschar, right thigh necrosis and abd wound   # Anemia    Plan:  Plastics and otho on the case. f/u appreciated.  cont local wound care as family declining surgery  s/p multiple debridements, cont wet to dry dressing abd and knee wounds TID  wound vac for thigh wound and abd wound  remains afebrile, cont suppressive minocycline, appreciate ID f/u.   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     # Recent septic Rt TKR - abx spacer exchanged and plastics complex wound closure  # PMR on chronic steroids  # thigh wound   # right knee eschar, right thigh necrosis and abd wound   # Anemia    Plan:  Plastics and otho on the case. f/u appreciated.  cont local wound care as family declining surgery  s/p multiple debridements, local wound care  wound vac for thigh wound and abd wound  remains afebrile, cont suppressive minocycline, appreciate ID f/u.   appreciate psych eval  heme follow up, epo if hgb <8  cont coumadin and asa  appreciate pain management recs  PT OOB however, pt resistant, dw patient importance of turning and sitting in chair  bowel regimen

## 2018-05-18 NOTE — PROGRESS NOTE ADULT - ATTENDING COMMENTS
as above- Wound care and nutrition appear to be primary issues --consequent Psych breakdown.  Pulm relatively stable-atelectasis, prior PE, asthma, cardiac Dz  Inhaler regimen as outlined above                      Heme--keep hgb above 8    ID follow up of ABX IV completed 5/5--then PO minocycline  (debridement done 5/5)--following WBC   PT evaluation and Psych evaluation    ortho/plastics follow up-knee immobilizer/PT--advance ambulation as able; Nutrition follow up  Rehab pending-pulm. cleared for DC to rehab (no new events 5/16)    Everett Kumar MD-Pulmonary   [phone] as above- Wound care and nutrition appear to be primary issues --consequent Psych breakdown.  Pulm relatively stable-atelectasis, prior PE, asthma, cardiac Dz  Inhaler regimen as outlined above                      Heme--keep hgb above 8--epo to be considered    ID follow up of ABX IV completed 5/5--then PO minocycline  (debridement done 5/5)--following WBC (fevers resolved)   PT evaluation and Psych evaluation    ortho/plastics follow up-knee immobilizer/PT--advance ambulation as able; Nutrition follow up  Rehab pending-pulm. cleared for DC to rehab (no new events 5/17)    Everett Kumar MD-Pulmonary   [phone]

## 2018-05-18 NOTE — PROGRESS NOTE ADULT - GUM GEN PE MLT EXAM PC
not examined

## 2018-05-18 NOTE — PROGRESS NOTE ADULT - NEUROLOGICAL
Alert & oriented; no sensory, motor or coordination deficits, normal reflexes

## 2018-05-18 NOTE — PROGRESS NOTE ADULT - SUBJECTIVE AND OBJECTIVE BOX
pain controlled, afebrile  in very good mood, happy about granddaughter engagement  stated that she is happy that she is now off nc o2  hoping to start progressing to bedside chair and/or wheelchair    abdominal wound - vac in place  RLE  thigh vac in place  surgical wound intact with stable eschar, some separation of eschar medially from skin, no exudate, dressed by prs/wound care  5/5 ta/ehl/gcs  silt l4-s1  2+ dp pulse

## 2018-05-18 NOTE — PROGRESS NOTE ADULT - PROBLEM SELECTOR PROBLEM 7
MYAH (obstructive sleep apnea)

## 2018-05-18 NOTE — PROGRESS NOTE ADULT - PROBLEM SELECTOR PLAN 7
refused rx

## 2018-05-18 NOTE — PROGRESS NOTE ADULT - PROBLEM SELECTOR PROBLEM 5
Gastroesophageal reflux disease, esophagitis presence not specified

## 2018-05-18 NOTE — PROGRESS NOTE ADULT - ASSESSMENT
no plan for orthopedic surgical intervention per family and patient preference  local wound care to knee and vac therapy to abd/thigh per plastics and wound care teams  PO abx per ID team  encourage patient to spend majority of bed oob in bedside chair as able  PT to help mobilize, she must remain 50% wb on the RLE and NO KNEE MOTION allowed, knee immobilizer if oob  coumadin, inr goal 2-3  continue to optimize nutrition  continue to involve psych	  dispo planning      Nate Bass MD no plan for orthopedic surgical intervention per family and patient preference  local wound care to knee and vac therapy to abd/thigh per plastics and wound care teams  PO abx per ID team  encourage patient to spend majority of bed oob in bedside chair as able  PT to help mobilize, she must remain 50% wb on the RLE and NO KNEE MOTION allowed, knee immobilizer if oob  coumadin, inr goal 2-3  continue to optimize nutrition  continue to involve psych	  dispo planning      Nate Bass MD      *please note that this note was put in on 5/19 but redated for 5/18 when the visit took place at approximately 6pm    I spoke with Ms. Rdz's  as well afterwards and updated him on her status

## 2018-05-18 NOTE — PROGRESS NOTE ADULT - EYES
EOMI; PERRL; no drainage or redness

## 2018-05-18 NOTE — PROGRESS NOTE ADULT - PROBLEM SELECTOR PLAN 5
GI prophylaxis:  PPI pre breakfast  aspiration precautions-all meals are to OOB as able

## 2018-05-18 NOTE — PROGRESS NOTE ADULT - ASSESSMENT
A: 77F with complex PMH/PSH who recently had placement of a right knee antibiotic spacer followed by PRS closure c/b skin necrosis and formation of an eschar over the anterior knee joint. The patient was readmitted with fevers and right thigh cellulitis which was complicated by skin breakdown. Patient also developed wound at the right/mid lower quadrant of the abdomen.    P:  >> Dressing change TID to knee  >> Betadine to eschar bid  >> VACs on abdomen and R thigh to be changed today  >> Medical optimization as per primary team.  >> DVT prophylaxis.  >> Protein supplementation of diet to encourage wound healing.   >> Discussed with Dr. Mg.     [facility] Plastic Surgery Pager -- [phone]  [facility] Plastic Surgery Pager -- [phone]

## 2018-05-18 NOTE — PROGRESS NOTE ADULT - GASTROINTESTINAL
Soft, non-tender, no hepatosplenomegaly, normal bowel sounds

## 2018-05-18 NOTE — PROGRESS NOTE ADULT - BACK
No deformity or limitation of movement
not examined
No deformity or limitation of movement

## 2018-05-18 NOTE — PROGRESS NOTE ADULT - PROBLEM SELECTOR PLAN 4
multifactorial-leg and lung--on ABX as per ID (daptomycin-completed 5/5)--next minocycline  if re-spikes will need to reconsider other sources of infection-?endocarditis-if fevers persist/recurs Resolved--  multifactorial-leg and lung--on ABX as per ID (daptomycin-completed 5/5)--next minocycline  if re-spikes will need to reconsider other sources of infection-?endocarditis-if fevers persist/recurs

## 2018-05-18 NOTE — PROGRESS NOTE ADULT - LYMPH NODES
No lymphadedenopathy

## 2018-05-18 NOTE — PROGRESS NOTE ADULT - PROBLEM SELECTOR PROBLEM 8
Aortic stenosis, severe

## 2018-05-18 NOTE — PROGRESS NOTE ADULT - SUBJECTIVE AND OBJECTIVE BOX
Patient is a 77y old  Female who presents with a chief complaint of fever (11 Apr 2018 14:25)      SUBJECTIVE / OVERNIGHT EVENTS:    Patient seen and examined.       Vital Signs Last 24 Hrs  T(C): 36.2 (18 May 2018 10:39), Max: 36.9 (17 May 2018 20:09)  T(F): 97.2 (18 May 2018 10:39), Max: 98.4 (17 May 2018 20:09)  HR: 80 (18 May 2018 10:39) (66 - 80)  BP: 131/72 (18 May 2018 10:39) (129/69 - 138/59)  BP(mean): --  RR: 18 (18 May 2018 10:39) (18 - 18)  SpO2: 94% (18 May 2018 10:39) (94% - 99%)  I&O's Summary    17 May 2018 07:01  -  18 May 2018 07:00  --------------------------------------------------------  IN: 1040 mL / OUT: 275 mL / NET: 765 mL        PE:  GENERAL: NAD, AAOx3, obese  HEAD:  Atraumatic, Normocephalic  EYES: EOMI, PERRLA, conjunctiva and sclera clear  NECK: Supple, No JVD  CHEST/LUNG: CTABL, No wheeze  HEART: Regular rate and rhythm; no murmur  ABDOMEN: Soft, Nontender, Nondistended; abd wound with vac  EXTREMITIES:  2+ Peripheral Pulses, right upper thigh with vac, right knee dressed  SKIN: No rashes or lesions  NEURO: No focal deficits      LABS:                        8.1    10.01 )-----------( 316      ( 17 May 2018 07:49 )             26.6     05-17    142  |  101  |  14  ----------------------------<  131<H>  3.7   |  31  |  0.61    Ca    9.3      17 May 2018 07:04      PT/INR - ( 18 May 2018 07:58 )   PT: 37.6 sec;   INR: 3.25 ratio           CAPILLARY BLOOD GLUCOSE                RADIOLOGY & ADDITIONAL TESTS:    Imaging Personally Reviewed:  [x] YES  [ ] NO    Consultant(s) Notes Reviewed:  [x] YES  [ ] NO    MEDICATIONS  (STANDING):  ascorbic acid 500 milliGRAM(s) Oral daily  aspirin enteric coated 81 milliGRAM(s) Oral daily  buDESOnide 160 MICROgram(s)/formoterol 4.5 MICROgram(s) Inhaler 2 Puff(s) Inhalation two times a day  clotrimazole/betamethasone Cream 1 Application(s) Topical two times a day  docusate sodium 100 milliGRAM(s) Oral three times a day  ferrous    sulfate 325 milliGRAM(s) Oral daily  folic acid 1 milliGRAM(s) Oral daily  gabapentin 200 milliGRAM(s) Oral three times a day  HYDROmorphone  Injectable 0.5 milliGRAM(s) IV Push once  melatonin 3 milliGRAM(s) Oral at bedtime  minocycline 100 milliGRAM(s) Oral two times a day  multivitamin 1 Tablet(s) Oral daily  nystatin    Suspension 232064 Unit(s) Oral every 6 hours  pantoprazole    Tablet 40 milliGRAM(s) Oral before breakfast  polyethylene glycol 3350 17 Gram(s) Oral daily  povidone iodine 10% Solution 1 Application(s) Topical every 12 hours  tiotropium 18 MICROgram(s) Capsule 1 Capsule(s) Inhalation daily  zinc oxide 20% Ointment 1 Application(s) Topical daily    MEDICATIONS  (PRN):  acetaminophen   Tablet 650 milliGRAM(s) Oral every 6 hours PRN For Temp greater than 38 C (100.4 F)  acetaminophen   Tablet. 650 milliGRAM(s) Oral every 6 hours PRN Mild Pain (1 - 3)  bisacodyl Suppository 10 milliGRAM(s) Rectal daily PRN Constipation  HYDROmorphone   Tablet 4 milliGRAM(s) Oral every 4 hours PRN Severe Pain (7 - 10)  HYDROmorphone   Tablet 2 milliGRAM(s) Oral every 4 hours PRN Moderate Pain (4 - 6)  HYDROmorphone  Injectable 0.5 milliGRAM(s) IV Push daily PRN Dressing change  lidocaine 2% Gel 1 Application(s) Topical daily PRN dressing change  lidocaine 2% Viscous 10 milliLiter(s) Swish and Spit every 6 hours PRN Mouth Sores  senna 2 Tablet(s) Oral at bedtime PRN Constipation      Care Discussed with Consultants/Other Providers [x] YES  [ ] NO    HEALTH ISSUES - PROBLEM Dx:  Pain: Pain  Adjustment disorder, unspecified type  Delirium due to another medical condition  Pneumonia: Pneumonia  Aortic stenosis, severe: Aortic stenosis, severe  MYAH (obstructive sleep apnea): MYAH (obstructive sleep apnea)  Obesity: Obesity  Asthma: Asthma  SOB (shortness of breath): SOB (shortness of breath)  Chronic deep vein thrombosis (DVT) of lower extremity, unspecified laterality, unspecified vein: Chronic deep vein thrombosis (DVT) of lower extremity, unspecified laterality, unspecified vein  Arthralgia of knee, unspecified laterality: Arthralgia of knee, unspecified laterality  Hyperlipidemia, unspecified hyperlipidemia type: Hyperlipidemia, unspecified hyperlipidemia type  Asthma, unspecified asthma severity, unspecified whether complicated, unspecified whether persistent: Asthma, unspecified asthma severity, unspecified whether complicated, unspecified whether persistent  Coronary artery disease involving native coronary artery of native heart without angina pectoris: Coronary artery disease involving native coronary artery of native heart without angina pectoris  Gastroesophageal reflux disease, esophagitis presence not specified: Gastroesophageal reflux disease, esophagitis presence not specified  Fever, unspecified fever cause: Fever, unspecified fever cause Patient is a 77y old  Female who presents with a chief complaint of fever (11 Apr 2018 14:25)      SUBJECTIVE / OVERNIGHT EVENTS:    Patient seen and examined. denies any complaints today. states she "dangled her legs" off the bed.       Vital Signs Last 24 Hrs  T(C): 36.2 (18 May 2018 10:39), Max: 36.9 (17 May 2018 20:09)  T(F): 97.2 (18 May 2018 10:39), Max: 98.4 (17 May 2018 20:09)  HR: 80 (18 May 2018 10:39) (66 - 80)  BP: 131/72 (18 May 2018 10:39) (129/69 - 138/59)  BP(mean): --  RR: 18 (18 May 2018 10:39) (18 - 18)  SpO2: 94% (18 May 2018 10:39) (94% - 99%)  I&O's Summary    17 May 2018 07:01  -  18 May 2018 07:00  --------------------------------------------------------  IN: 1040 mL / OUT: 275 mL / NET: 765 mL        PE:  GENERAL: NAD, AAOx3, obese  HEAD:  Atraumatic, Normocephalic  EYES: EOMI, PERRLA, conjunctiva and sclera clear  NECK: Supple, No JVD  CHEST/LUNG: CTABL, No wheeze  HEART: Regular rate and rhythm; no murmur  ABDOMEN: Soft, Nontender, Nondistended; abd wound with vac  EXTREMITIES:  2+ Peripheral Pulses, right upper thigh with vac, right knee dressed  SKIN: No rashes or lesions  NEURO: No focal deficits      LABS:                        8.1    10.01 )-----------( 316      ( 17 May 2018 07:49 )             26.6     05-17    142  |  101  |  14  ----------------------------<  131<H>  3.7   |  31  |  0.61    Ca    9.3      17 May 2018 07:04      PT/INR - ( 18 May 2018 07:58 )   PT: 37.6 sec;   INR: 3.25 ratio           CAPILLARY BLOOD GLUCOSE                RADIOLOGY & ADDITIONAL TESTS:    Imaging Personally Reviewed:  [x] YES  [ ] NO    Consultant(s) Notes Reviewed:  [x] YES  [ ] NO    MEDICATIONS  (STANDING):  ascorbic acid 500 milliGRAM(s) Oral daily  aspirin enteric coated 81 milliGRAM(s) Oral daily  buDESOnide 160 MICROgram(s)/formoterol 4.5 MICROgram(s) Inhaler 2 Puff(s) Inhalation two times a day  clotrimazole/betamethasone Cream 1 Application(s) Topical two times a day  docusate sodium 100 milliGRAM(s) Oral three times a day  ferrous    sulfate 325 milliGRAM(s) Oral daily  folic acid 1 milliGRAM(s) Oral daily  gabapentin 200 milliGRAM(s) Oral three times a day  HYDROmorphone  Injectable 0.5 milliGRAM(s) IV Push once  melatonin 3 milliGRAM(s) Oral at bedtime  minocycline 100 milliGRAM(s) Oral two times a day  multivitamin 1 Tablet(s) Oral daily  nystatin    Suspension 172911 Unit(s) Oral every 6 hours  pantoprazole    Tablet 40 milliGRAM(s) Oral before breakfast  polyethylene glycol 3350 17 Gram(s) Oral daily  povidone iodine 10% Solution 1 Application(s) Topical every 12 hours  tiotropium 18 MICROgram(s) Capsule 1 Capsule(s) Inhalation daily  zinc oxide 20% Ointment 1 Application(s) Topical daily    MEDICATIONS  (PRN):  acetaminophen   Tablet 650 milliGRAM(s) Oral every 6 hours PRN For Temp greater than 38 C (100.4 F)  acetaminophen   Tablet. 650 milliGRAM(s) Oral every 6 hours PRN Mild Pain (1 - 3)  bisacodyl Suppository 10 milliGRAM(s) Rectal daily PRN Constipation  HYDROmorphone   Tablet 4 milliGRAM(s) Oral every 4 hours PRN Severe Pain (7 - 10)  HYDROmorphone   Tablet 2 milliGRAM(s) Oral every 4 hours PRN Moderate Pain (4 - 6)  HYDROmorphone  Injectable 0.5 milliGRAM(s) IV Push daily PRN Dressing change  lidocaine 2% Gel 1 Application(s) Topical daily PRN dressing change  lidocaine 2% Viscous 10 milliLiter(s) Swish and Spit every 6 hours PRN Mouth Sores  senna 2 Tablet(s) Oral at bedtime PRN Constipation      Care Discussed with Consultants/Other Providers [x] YES  [ ] NO    HEALTH ISSUES - PROBLEM Dx:  Pain: Pain  Adjustment disorder, unspecified type  Delirium due to another medical condition  Pneumonia: Pneumonia  Aortic stenosis, severe: Aortic stenosis, severe  MYAH (obstructive sleep apnea): MYAH (obstructive sleep apnea)  Obesity: Obesity  Asthma: Asthma  SOB (shortness of breath): SOB (shortness of breath)  Chronic deep vein thrombosis (DVT) of lower extremity, unspecified laterality, unspecified vein: Chronic deep vein thrombosis (DVT) of lower extremity, unspecified laterality, unspecified vein  Arthralgia of knee, unspecified laterality: Arthralgia of knee, unspecified laterality  Hyperlipidemia, unspecified hyperlipidemia type: Hyperlipidemia, unspecified hyperlipidemia type  Asthma, unspecified asthma severity, unspecified whether complicated, unspecified whether persistent: Asthma, unspecified asthma severity, unspecified whether complicated, unspecified whether persistent  Coronary artery disease involving native coronary artery of native heart without angina pectoris: Coronary artery disease involving native coronary artery of native heart without angina pectoris  Gastroesophageal reflux disease, esophagitis presence not specified: Gastroesophageal reflux disease, esophagitis presence not specified  Fever, unspecified fever cause: Fever, unspecified fever cause

## 2018-05-18 NOTE — PROGRESS NOTE ADULT - BREASTS
not examined

## 2018-05-18 NOTE — PROGRESS NOTE ADULT - SUBJECTIVE AND OBJECTIVE BOX
CC: f/u for septic prosthetic right knee    Patient reports she feels great off oxygen and is very happy because her granddaughter got engaged    REVIEW OF SYSTEMS:  All other review of systems negative (Comprehensive ROS)    Antimicrobials Day #  :  minocycline 100 milliGRAM(s) Oral two times a day  nystatin    Suspension 671645 Unit(s) Oral every 6 hours    Other Medications Reviewed    T(F): 97.2 (05-18-18 @ 10:39), Max: 98.4 (05-17-18 @ 20:09)  HR: 80 (05-18-18 @ 10:39)  BP: 131/72 (05-18-18 @ 10:39)  RR: 18 (05-18-18 @ 10:39)  SpO2: 94% (05-18-18 @ 10:39)  Wt(kg): --    PHYSICAL EXAM:  General: alert, no acute distress  Eyes:  anicteric, no conjunctival injection, no discharge  Oropharynx: no lesions or injection 	  Neck: supple, without adenopathy  Lungs: clear to auscultation  Heart: s1s2 2/6 sys m  Abdomen: soft, nondistended, nontender, without mass or organomegaly. vac over wound  Skin: no lesions  Extremities: vac over right thigh wound and eschar over knee. 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 The appearance is grossly unchanged from the prior exam.       < end of copied text >    Assessment:  Patient s/p rudy from right knee and spacer for strept infection then returned wiht nonhealing wound and had spacer change, fusion ruthann, plastic closure and grew mrsa. Wound developed necrotic skin and then pneumonia and hematoma over the right thigh with necrosis of skin too. Now finished full course of iv abx and on suppressive minocycline for the knee. Pneumonia treated. Has debrided thigh and abdomen wound with vac. No uncontrolled infection at present  Plan: continue suppressive minocycline  local wound care per plastics and wound care team

## 2018-05-18 NOTE — PROGRESS NOTE ADULT - NS NEC GEN PE MLT EXAM PC
No bruits; no thyromegaly or nodules

## 2018-05-18 NOTE — PROGRESS NOTE ADULT - PROBLEM SELECTOR PROBLEM 1
SOB (shortness of breath)

## 2018-05-18 NOTE — PROGRESS NOTE ADULT - SUBJECTIVE AND OBJECTIVE BOX
CHIEF COMPLAINT:    Interval Events:    REVIEW OF SYSTEMS:  Constitutional: No fevers or chills. No weight loss. No fatigue or generalized malaise.  Eyes: No itching or discharge from the eyes  ENT: No ear pain. No ear discharge. No nasal congestion. No post nasal drip. No epistaxis. No throat pain. No sore throat. No difficulty swallowing.   CV: No chest pain. No palpitations. No lightheadedness or dizziness.   Resp: No dyspnea at rest. No dyspnea on exertion. No orthopnea. No wheezing. No cough. No stridor. No sputum production. No chest pain with respiration.  GI: No nausea. No vomiting. No diarrhea.  MSK: No joint pain or pain in any extremities  Integumentary: No skin lesions. No pedal edema.  Neurological: No gross motor weakness. No sensory changes.  [ ] All other systems negative  [ ] Unable to assess ROS because ________    OBJECTIVE:  ICU Vital Signs Last 24 Hrs  T(C): 36.9 (17 May 2018 20:09), Max: 36.9 (17 May 2018 20:09)  T(F): 98.4 (17 May 2018 20:09), Max: 98.4 (17 May 2018 20:09)  HR: 71 (17 May 2018 20:09) (71 - 98)  BP: 138/59 (17 May 2018 20:09) (130/83 - 138/59)  BP(mean): --  ABP: --  ABP(mean): --  RR: 18 (17 May 2018 20:09) (18 - 18)  SpO2: 96% (17 May 2018 20:09) (96% - 99%)        05-16 @ 07:01  -  05-17 @ 07:00  --------------------------------------------------------  IN: 1180 mL / OUT: 250 mL / NET: 930 mL    05-17 @ 07:01  -  05-18 @ 05:06  --------------------------------------------------------  IN: 920 mL / OUT: 0 mL / NET: 920 mL      CAPILLARY BLOOD GLUCOSE          PHYSICAL EXAM:  General: Awake, alert, oriented X 3.   HEENT: Atraumatic, normocephalic.                 Mallampatti Grade                 No nasal congestion.                No tonsillar or pharyngeal exudates.  Lymph Nodes: No palpable lymphadenopathy  Neck: No JVD. No carotid bruit.   Respiratory: Normal chest expansion                         Normal percussion                         Normal and equal air entry                         No wheeze, rhonchi or rales.  Cardiovascular: S1 S2 normal. No murmurs, rubs or gallops.   Abdomen: Soft, non-tender, non-distended. No organomegaly.  Extremities: Warm to touch. Peripheral pulse palpable. No pedal edema.   Skin: No rashes or skin lesions  Neurological: Motor and sensory examination equal and normal in all four extremities.  Psychiatry: Appropriate mood and affect.    HOSPITAL MEDICATIONS:  MEDICATIONS  (STANDING):  ascorbic acid 500 milliGRAM(s) Oral daily  aspirin enteric coated 81 milliGRAM(s) Oral daily  buDESOnide 160 MICROgram(s)/formoterol 4.5 MICROgram(s) Inhaler 2 Puff(s) Inhalation two times a day  clotrimazole/betamethasone Cream 1 Application(s) Topical two times a day  docusate sodium 100 milliGRAM(s) Oral three times a day  ferrous    sulfate 325 milliGRAM(s) Oral daily  folic acid 1 milliGRAM(s) Oral daily  gabapentin 200 milliGRAM(s) Oral three times a day  HYDROmorphone  Injectable 0.5 milliGRAM(s) IV Push once  melatonin 3 milliGRAM(s) Oral at bedtime  minocycline 100 milliGRAM(s) Oral two times a day  multivitamin 1 Tablet(s) Oral daily  nystatin    Suspension 253833 Unit(s) Oral every 6 hours  pantoprazole    Tablet 40 milliGRAM(s) Oral before breakfast  polyethylene glycol 3350 17 Gram(s) Oral daily  povidone iodine 10% Solution 1 Application(s) Topical every 12 hours  tiotropium 18 MICROgram(s) Capsule 1 Capsule(s) Inhalation daily  zinc oxide 20% Ointment 1 Application(s) Topical daily    MEDICATIONS  (PRN):  acetaminophen   Tablet 650 milliGRAM(s) Oral every 6 hours PRN For Temp greater than 38 C (100.4 F)  acetaminophen   Tablet. 650 milliGRAM(s) Oral every 6 hours PRN Mild Pain (1 - 3)  bisacodyl Suppository 10 milliGRAM(s) Rectal daily PRN Constipation  HYDROmorphone   Tablet 4 milliGRAM(s) Oral every 4 hours PRN Severe Pain (7 - 10)  HYDROmorphone   Tablet 2 milliGRAM(s) Oral every 4 hours PRN Moderate Pain (4 - 6)  HYDROmorphone  Injectable 0.5 milliGRAM(s) IV Push daily PRN Dressing change  lidocaine 2% Gel 1 Application(s) Topical daily PRN dressing change  lidocaine 2% Viscous 10 milliLiter(s) Swish and Spit every 6 hours PRN Mouth Sores  senna 2 Tablet(s) Oral at bedtime PRN Constipation      LABS:                        8.1    10.01 )-----------( 316      ( 17 May 2018 07:49 )             26.6     05-17    142  |  101  |  14  ----------------------------<  131<H>  3.7   |  31  |  0.61    Ca    9.3      17 May 2018 07:04  Mg     1.6     05-16      PT/INR - ( 17 May 2018 07:49 )   PT: 38.0 sec;   INR: 3.28 ratio                   MICROBIOLOGY:     RADIOLOGY:  [ ] Reviewed and interpreted by me    Point of Care Ultrasound Findings:    PFT:    EKG: CHIEF COMPLAINT: upset over pain--no ambulation and not out of bed    Interval Events: plastics/ortho    REVIEW OF SYSTEMS:  Constitutional: No fevers or chills. No weight loss. ++fatigue or generalized malaise.  Eyes: No itching or discharge from the eyes  ENT: No ear pain. No ear discharge. No nasal congestion. No post nasal drip. No epistaxis. No throat pain. No sore throat. No difficulty swallowing.   CV: No chest pain. No palpitations. No lightheadedness or dizziness.   Resp: No dyspnea at rest. ++ dyspnea on exertion. No orthopnea. No wheezing. No cough. No stridor. No sputum production. No chest pain with respiration.  GI: No nausea. No vomiting. No diarrhea.  MSK: No joint pain or pain in any extremities--except wound site  Integumentary: No skin lesions. No pedal edema.  Neurological: No gross motor weakness. No sensory changes.  [+ ] All other systems negative  [ ] Unable to assess ROS because ________    OBJECTIVE:  ICU Vital Signs Last 24 Hrs  T(C): 36.9 (17 May 2018 20:09), Max: 36.9 (17 May 2018 20:09)  T(F): 98.4 (17 May 2018 20:09), Max: 98.4 (17 May 2018 20:09)  HR: 71 (17 May 2018 20:09) (71 - 98)  BP: 138/59 (17 May 2018 20:09) (130/83 - 138/59)  BP(mean): --  ABP: --  ABP(mean): --  RR: 18 (17 May 2018 20:09) (18 - 18)  SpO2: 96% (17 May 2018 20:09) (96% - 99%)        05-16 @ 07:01  -  05-17 @ 07:00  --------------------------------------------------------  IN: 1180 mL / OUT: 250 mL / NET: 930 mL    05-17 @ 07:01  -  05-18 @ 05:06  --------------------------------------------------------  IN: 920 mL / OUT: 0 mL / NET: 920 mL      CAPILLARY BLOOD GLUCOSE          PHYSICAL EXAM: NAD in bed on NC  General: Awake, alert, oriented X 3.   HEENT: Atraumatic, normocephalic.                 Mallampatti Grade                 No nasal congestion.                No tonsillar or pharyngeal exudates.  Lymph Nodes: No palpable lymphadenopathy  Neck: No JVD. No carotid bruit.   Respiratory: abnormal chest expansion-reduced BS bases                         Normal percussion                         Normal and equal air entry                         No wheeze, rhonchi or rales.  Cardiovascular: S1 S2 normal. No murmurs, rubs or gallops.   Abdomen: Soft, non-tender, non-distended. No organomegaly.  Extremities: Warm to touch. Peripheral pulse palpable. No pedal edema. Wound VAC IN PLACE  Skin: No rashes or skin lesions  Neurological: Motor and sensory examination equal and normal in all four extremities.  Psychiatry: Appropriate mood and affect.    HOSPITAL MEDICATIONS:  MEDICATIONS  (STANDING):  ascorbic acid 500 milliGRAM(s) Oral daily  aspirin enteric coated 81 milliGRAM(s) Oral daily  buDESOnide 160 MICROgram(s)/formoterol 4.5 MICROgram(s) Inhaler 2 Puff(s) Inhalation two times a day  clotrimazole/betamethasone Cream 1 Application(s) Topical two times a day  docusate sodium 100 milliGRAM(s) Oral three times a day  ferrous    sulfate 325 milliGRAM(s) Oral daily  folic acid 1 milliGRAM(s) Oral daily  gabapentin 200 milliGRAM(s) Oral three times a day  HYDROmorphone  Injectable 0.5 milliGRAM(s) IV Push once  melatonin 3 milliGRAM(s) Oral at bedtime  minocycline 100 milliGRAM(s) Oral two times a day  multivitamin 1 Tablet(s) Oral daily  nystatin    Suspension 408348 Unit(s) Oral every 6 hours  pantoprazole    Tablet 40 milliGRAM(s) Oral before breakfast  polyethylene glycol 3350 17 Gram(s) Oral daily  povidone iodine 10% Solution 1 Application(s) Topical every 12 hours  tiotropium 18 MICROgram(s) Capsule 1 Capsule(s) Inhalation daily  zinc oxide 20% Ointment 1 Application(s) Topical daily    MEDICATIONS  (PRN):  acetaminophen   Tablet 650 milliGRAM(s) Oral every 6 hours PRN For Temp greater than 38 C (100.4 F)  acetaminophen   Tablet. 650 milliGRAM(s) Oral every 6 hours PRN Mild Pain (1 - 3)  bisacodyl Suppository 10 milliGRAM(s) Rectal daily PRN Constipation  HYDROmorphone   Tablet 4 milliGRAM(s) Oral every 4 hours PRN Severe Pain (7 - 10)  HYDROmorphone   Tablet 2 milliGRAM(s) Oral every 4 hours PRN Moderate Pain (4 - 6)  HYDROmorphone  Injectable 0.5 milliGRAM(s) IV Push daily PRN Dressing change  lidocaine 2% Gel 1 Application(s) Topical daily PRN dressing change  lidocaine 2% Viscous 10 milliLiter(s) Swish and Spit every 6 hours PRN Mouth Sores  senna 2 Tablet(s) Oral at bedtime PRN Constipation      LABS:                        8.1    10.01 )-----------( 316      ( 17 May 2018 07:49 )             26.6     05-17    142  |  101  |  14  ----------------------------<  131<H>  3.7   |  31  |  0.61    Ca    9.3      17 May 2018 07:04  Mg     1.6     05-16      PT/INR - ( 17 May 2018 07:49 )   PT: 38.0 sec;   INR: 3.28 ratio                   MICROBIOLOGY:     RADIOLOGY:  [ ] Reviewed and interpreted by me    Point of Care Ultrasound Findings:    PFT:    EKG:

## 2018-05-18 NOTE — PROGRESS NOTE ADULT - PROBLEM SELECTOR PLAN 2
symbicort 160 2 bid  spiriva 1 puff q day  albuterol via HHN q 6 h  pulmonary toilet-incentive spirometry, Chest Pt-acapella/chest vest-up to 5 times per day.    maintain oxygen levels above 90%-supplemental oxygen via nasal canula-humidified    All nebulized therapy is to be administered via hand held nebulizer-(patient must gargle and spit with water after use)

## 2018-05-18 NOTE — PROGRESS NOTE ADULT - VASCULAR
Equal and normal pulses (carotid, femoral, dorsalis pedis)

## 2018-05-18 NOTE — PROGRESS NOTE ADULT - PSYCHIATRIC
Affect and characteristics of appearance, verbalizations, behaviors are appropriate
detailed exam
Affect and characteristics of appearance, verbalizations, behaviors are appropriate

## 2018-05-18 NOTE — PROGRESS NOTE ADULT - PROBLEM SELECTOR PROBLEM 3
Chronic deep vein thrombosis (DVT) of lower extremity, unspecified laterality, unspecified vein

## 2018-05-18 NOTE — PROGRESS NOTE ADULT - ENMT
No oral lesions; no gross abnormalities

## 2018-05-18 NOTE — PROGRESS NOTE ADULT - SUBJECTIVE AND OBJECTIVE BOX
PLASTIC SURGERY DAILY PROGRESS NOTE:    SUBJECTIVE:  Patient seen and examined. No acute events overnight.     OBJECTIVE:   Vital Signs Last 24 Hrs  T(C): 36.4 (18 May 2018 06:17), Max: 36.9 (17 May 2018 20:09)  T(F): 97.6 (18 May 2018 06:17), Max: 98.4 (17 May 2018 20:09)  HR: 66 (18 May 2018 06:17) (66 - 73)  BP: 129/69 (18 May 2018 06:17) (129/69 - 138/59)  BP(mean): --  RR: 18 (18 May 2018 06:17) (18 - 18)  SpO2: 98% (18 May 2018 06:17) (96% - 99%)    I&O's Detail    17 May 2018 07:01  -  18 May 2018 07:00  --------------------------------------------------------  IN:    Oral Fluid: 1040 mL  Total IN: 1040 mL    OUT:    Voided: 275 mL  Total OUT: 275 mL    Total NET: 765 mL    MEDICATIONS  (STANDING):  ascorbic acid 500 milliGRAM(s) Oral daily  aspirin enteric coated 81 milliGRAM(s) Oral daily  buDESOnide 160 MICROgram(s)/formoterol 4.5 MICROgram(s) Inhaler 2 Puff(s) Inhalation two times a day  clotrimazole/betamethasone Cream 1 Application(s) Topical two times a day  docusate sodium 100 milliGRAM(s) Oral three times a day  ferrous    sulfate 325 milliGRAM(s) Oral daily  folic acid 1 milliGRAM(s) Oral daily  gabapentin 200 milliGRAM(s) Oral three times a day  HYDROmorphone  Injectable 0.5 milliGRAM(s) IV Push once  melatonin 3 milliGRAM(s) Oral at bedtime  minocycline 100 milliGRAM(s) Oral two times a day  multivitamin 1 Tablet(s) Oral daily  nystatin    Suspension 995459 Unit(s) Oral every 6 hours  pantoprazole    Tablet 40 milliGRAM(s) Oral before breakfast  polyethylene glycol 3350 17 Gram(s) Oral daily  povidone iodine 10% Solution 1 Application(s) Topical every 12 hours  tiotropium 18 MICROgram(s) Capsule 1 Capsule(s) Inhalation daily  zinc oxide 20% Ointment 1 Application(s) Topical daily    MEDICATIONS  (PRN):  acetaminophen   Tablet 650 milliGRAM(s) Oral every 6 hours PRN For Temp greater than 38 C (100.4 F)  acetaminophen   Tablet. 650 milliGRAM(s) Oral every 6 hours PRN Mild Pain (1 - 3)  bisacodyl Suppository 10 milliGRAM(s) Rectal daily PRN Constipation  HYDROmorphone   Tablet 4 milliGRAM(s) Oral every 4 hours PRN Severe Pain (7 - 10)  HYDROmorphone   Tablet 2 milliGRAM(s) Oral every 4 hours PRN Moderate Pain (4 - 6)  HYDROmorphone  Injectable 0.5 milliGRAM(s) IV Push daily PRN Dressing change  lidocaine 2% Gel 1 Application(s) Topical daily PRN dressing change  lidocaine 2% Viscous 10 milliLiter(s) Swish and Spit every 6 hours PRN Mouth Sores  senna 2 Tablet(s) Oral at bedtime PRN Constipation    ** PHYSICAL EXAM **  -- CONSTITUTIONAL: AOx3. NAD.   -- ABDOMEN: wound with NPWT, holding suction  -- EXTREMITIES: The right thigh wound has a VAC in place. Stable eschar overlying patella, painted with betadine.  Medial wound with wet to dry gauze.    LABS:               8.1    10.01 )-----------( 316      ( 17 May 2018 07:49 )             26.6     05-17    142  |  101  |  14  ----------------------------<  131<H>  3.7   |  31  |  0.61    Ca    9.3      17 May 2018 07:04

## 2018-05-18 NOTE — PROGRESS NOTE ADULT - MUSCULOSKELETAL
No joint pain, swelling or deformity; no limitation of movement
not examined
No joint pain, swelling or deformity; no limitation of movement

## 2018-05-18 NOTE — PROGRESS NOTE ADULT - PROBLEM SELECTOR PROBLEM 6
Obesity

## 2018-05-18 NOTE — PROGRESS NOTE ADULT - PROBLEM SELECTOR PLAN 8
cards follow up-mult rx

## 2018-05-18 NOTE — PROGRESS NOTE ADULT - SKIN
No lesions; no rash

## 2018-05-18 NOTE — PROGRESS NOTE ADULT - PROBLEM SELECTOR PROBLEM 2
Asthma

## 2018-05-18 NOTE — PROGRESS NOTE ADULT - PROBLEM SELECTOR PROBLEM 4
Fever, unspecified fever cause

## 2018-05-18 NOTE — PROGRESS NOTE ADULT - CONSTITUTIONAL
Well-developed, well nourished

## 2018-05-18 NOTE — PROGRESS NOTE ADULT - ADDITIONAL PE
as above
as above-psych appropriate
as above

## 2018-05-19 LAB
ANION GAP SERPL CALC-SCNC: 7 MMOL/L — SIGNIFICANT CHANGE UP (ref 5–17)
BUN SERPL-MCNC: 17 MG/DL — SIGNIFICANT CHANGE UP (ref 7–23)
CALCIUM SERPL-MCNC: 8.7 MG/DL — SIGNIFICANT CHANGE UP (ref 8.4–10.5)
CHLORIDE SERPL-SCNC: 100 MMOL/L — SIGNIFICANT CHANGE UP (ref 96–108)
CO2 SERPL-SCNC: 32 MMOL/L — HIGH (ref 22–31)
CREAT SERPL-MCNC: 0.59 MG/DL — SIGNIFICANT CHANGE UP (ref 0.5–1.3)
GLUCOSE SERPL-MCNC: 108 MG/DL — HIGH (ref 70–99)
HCT VFR BLD CALC: 23.6 % — LOW (ref 34.5–45)
HGB BLD-MCNC: 7.3 G/DL — LOW (ref 11.5–15.5)
INR BLD: 2.8 RATIO — HIGH (ref 0.88–1.16)
MCHC RBC-ENTMCNC: 27.3 PG — SIGNIFICANT CHANGE UP (ref 27–34)
MCHC RBC-ENTMCNC: 30.9 GM/DL — LOW (ref 32–36)
MCV RBC AUTO: 88.4 FL — SIGNIFICANT CHANGE UP (ref 80–100)
PLATELET # BLD AUTO: 278 K/UL — SIGNIFICANT CHANGE UP (ref 150–400)
POTASSIUM SERPL-MCNC: 3.6 MMOL/L — SIGNIFICANT CHANGE UP (ref 3.5–5.3)
POTASSIUM SERPL-SCNC: 3.6 MMOL/L — SIGNIFICANT CHANGE UP (ref 3.5–5.3)
PROTHROM AB SERPL-ACNC: 32.3 SEC — HIGH (ref 10–13.1)
RBC # BLD: 2.67 M/UL — LOW (ref 3.8–5.2)
RBC # FLD: 18 % — HIGH (ref 10.3–14.5)
SODIUM SERPL-SCNC: 139 MMOL/L — SIGNIFICANT CHANGE UP (ref 135–145)
WBC # BLD: 8.79 K/UL — SIGNIFICANT CHANGE UP (ref 3.8–10.5)
WBC # FLD AUTO: 8.79 K/UL — SIGNIFICANT CHANGE UP (ref 3.8–10.5)

## 2018-05-19 RX ORDER — WARFARIN SODIUM 2.5 MG/1
0.5 TABLET ORAL ONCE
Qty: 0 | Refills: 0 | Status: COMPLETED | OUTPATIENT
Start: 2018-05-19 | End: 2018-05-19

## 2018-05-19 RX ORDER — HYDROMORPHONE HYDROCHLORIDE 2 MG/ML
0.5 INJECTION INTRAMUSCULAR; INTRAVENOUS; SUBCUTANEOUS ONCE
Qty: 0 | Refills: 0 | Status: DISCONTINUED | OUTPATIENT
Start: 2018-05-19 | End: 2018-05-19

## 2018-05-19 RX ADMIN — Medication 3 MILLIGRAM(S): at 21:50

## 2018-05-19 RX ADMIN — Medication 1 APPLICATION(S): at 17:37

## 2018-05-19 RX ADMIN — HYDROMORPHONE HYDROCHLORIDE 4 MILLIGRAM(S): 2 INJECTION INTRAMUSCULAR; INTRAVENOUS; SUBCUTANEOUS at 22:25

## 2018-05-19 RX ADMIN — Medication 500000 UNIT(S): at 12:41

## 2018-05-19 RX ADMIN — GABAPENTIN 200 MILLIGRAM(S): 400 CAPSULE ORAL at 21:50

## 2018-05-19 RX ADMIN — BUDESONIDE AND FORMOTEROL FUMARATE DIHYDRATE 2 PUFF(S): 160; 4.5 AEROSOL RESPIRATORY (INHALATION) at 17:38

## 2018-05-19 RX ADMIN — Medication 1 MILLIGRAM(S): at 12:44

## 2018-05-19 RX ADMIN — GABAPENTIN 200 MILLIGRAM(S): 400 CAPSULE ORAL at 12:44

## 2018-05-19 RX ADMIN — PANTOPRAZOLE SODIUM 40 MILLIGRAM(S): 20 TABLET, DELAYED RELEASE ORAL at 05:49

## 2018-05-19 RX ADMIN — ZINC OXIDE 1 APPLICATION(S): 200 OINTMENT TOPICAL at 12:44

## 2018-05-19 RX ADMIN — CLOTRIMAZOLE AND BETAMETHASONE DIPROPIONATE 1 APPLICATION(S): 10; .5 CREAM TOPICAL at 05:49

## 2018-05-19 RX ADMIN — HYDROMORPHONE HYDROCHLORIDE 4 MILLIGRAM(S): 2 INJECTION INTRAMUSCULAR; INTRAVENOUS; SUBCUTANEOUS at 06:19

## 2018-05-19 RX ADMIN — Medication 1 TABLET(S): at 12:42

## 2018-05-19 RX ADMIN — Medication 100 MILLIGRAM(S): at 05:49

## 2018-05-19 RX ADMIN — Medication 500000 UNIT(S): at 00:25

## 2018-05-19 RX ADMIN — MINOCYCLINE HYDROCHLORIDE 100 MILLIGRAM(S): 45 TABLET, EXTENDED RELEASE ORAL at 05:49

## 2018-05-19 RX ADMIN — Medication 500 MILLIGRAM(S): at 12:42

## 2018-05-19 RX ADMIN — Medication 325 MILLIGRAM(S): at 12:42

## 2018-05-19 RX ADMIN — POLYETHYLENE GLYCOL 3350 17 GRAM(S): 17 POWDER, FOR SOLUTION ORAL at 12:44

## 2018-05-19 RX ADMIN — HYDROMORPHONE HYDROCHLORIDE 4 MILLIGRAM(S): 2 INJECTION INTRAMUSCULAR; INTRAVENOUS; SUBCUTANEOUS at 12:58

## 2018-05-19 RX ADMIN — CLOTRIMAZOLE AND BETAMETHASONE DIPROPIONATE 1 APPLICATION(S): 10; .5 CREAM TOPICAL at 17:38

## 2018-05-19 RX ADMIN — SENNA PLUS 2 TABLET(S): 8.6 TABLET ORAL at 21:50

## 2018-05-19 RX ADMIN — MINOCYCLINE HYDROCHLORIDE 100 MILLIGRAM(S): 45 TABLET, EXTENDED RELEASE ORAL at 17:38

## 2018-05-19 RX ADMIN — TIOTROPIUM BROMIDE 1 CAPSULE(S): 18 CAPSULE ORAL; RESPIRATORY (INHALATION) at 12:43

## 2018-05-19 RX ADMIN — Medication 500000 UNIT(S): at 05:49

## 2018-05-19 RX ADMIN — GABAPENTIN 200 MILLIGRAM(S): 400 CAPSULE ORAL at 05:49

## 2018-05-19 RX ADMIN — HYDROMORPHONE HYDROCHLORIDE 4 MILLIGRAM(S): 2 INJECTION INTRAMUSCULAR; INTRAVENOUS; SUBCUTANEOUS at 13:28

## 2018-05-19 RX ADMIN — BUDESONIDE AND FORMOTEROL FUMARATE DIHYDRATE 2 PUFF(S): 160; 4.5 AEROSOL RESPIRATORY (INHALATION) at 05:50

## 2018-05-19 RX ADMIN — Medication 81 MILLIGRAM(S): at 12:42

## 2018-05-19 RX ADMIN — Medication 500000 UNIT(S): at 17:38

## 2018-05-19 RX ADMIN — HYDROMORPHONE HYDROCHLORIDE 0.5 MILLIGRAM(S): 2 INJECTION INTRAMUSCULAR; INTRAVENOUS; SUBCUTANEOUS at 06:25

## 2018-05-19 RX ADMIN — HYDROMORPHONE HYDROCHLORIDE 4 MILLIGRAM(S): 2 INJECTION INTRAMUSCULAR; INTRAVENOUS; SUBCUTANEOUS at 05:49

## 2018-05-19 RX ADMIN — HYDROMORPHONE HYDROCHLORIDE 4 MILLIGRAM(S): 2 INJECTION INTRAMUSCULAR; INTRAVENOUS; SUBCUTANEOUS at 23:25

## 2018-05-19 RX ADMIN — Medication 100 MILLIGRAM(S): at 12:42

## 2018-05-19 RX ADMIN — Medication 1 APPLICATION(S): at 05:50

## 2018-05-19 RX ADMIN — WARFARIN SODIUM 0.5 MILLIGRAM(S): 2.5 TABLET ORAL at 21:50

## 2018-05-19 RX ADMIN — HYDROMORPHONE HYDROCHLORIDE 0.5 MILLIGRAM(S): 2 INJECTION INTRAMUSCULAR; INTRAVENOUS; SUBCUTANEOUS at 06:55

## 2018-05-19 RX ADMIN — Medication 100 MILLIGRAM(S): at 21:50

## 2018-05-19 NOTE — PROGRESS NOTE ADULT - SUBJECTIVE AND OBJECTIVE BOX
Patient is a 77y old  Female who presents with a chief complaint of fever (11 Apr 2018 14:25)      SUBJECTIVE / OVERNIGHT EVENTS:    Patient seen and examined. feels well. denies cp sob.      Vital Signs Last 24 Hrs  T(C): 36.6 (19 May 2018 05:47), Max: 36.6 (19 May 2018 05:47)  T(F): 97.8 (19 May 2018 05:47), Max: 97.8 (19 May 2018 05:47)  HR: 75 (19 May 2018 05:47) (75 - 76)  BP: 139/67 (19 May 2018 05:47) (127/69 - 139/67)  BP(mean): --  RR: 18 (19 May 2018 05:47) (18 - 18)  SpO2: 95% (19 May 2018 05:47) (95% - 96%)  I&O's Summary    18 May 2018 07:01  -  19 May 2018 07:00  --------------------------------------------------------  IN: 960 mL / OUT: 200 mL / NET: 760 mL        PE:  GENERAL: NAD, AAOx3, obese  HEAD:  Atraumatic, Normocephalic  EYES: EOMI, PERRLA, conjunctiva and sclera clear  NECK: Supple, No JVD  CHEST/LUNG: CTABL, No wheeze  HEART: Regular rate and rhythm; no murmur  ABDOMEN: Soft, Nontender, Nondistended; abd wound with vac  EXTREMITIES:  2+ Peripheral Pulses, right upper thigh with vac, right knee dressed  SKIN: No rashes or lesions  NEURO: No focal deficits    LABS:    05-19    139  |  100  |  17  ----------------------------<  108<H>  3.6   |  32<H>  |  0.59    Ca    8.7      19 May 2018 06:46      PT/INR - ( 19 May 2018 08:34 )   PT: 32.3 sec;   INR: 2.80 ratio           CAPILLARY BLOOD GLUCOSE                RADIOLOGY & ADDITIONAL TESTS:    Imaging Personally Reviewed:  [x] YES  [ ] NO    Consultant(s) Notes Reviewed:  [x] YES  [ ] NO    MEDICATIONS  (STANDING):  ascorbic acid 500 milliGRAM(s) Oral daily  aspirin enteric coated 81 milliGRAM(s) Oral daily  buDESOnide 160 MICROgram(s)/formoterol 4.5 MICROgram(s) Inhaler 2 Puff(s) Inhalation two times a day  clotrimazole/betamethasone Cream 1 Application(s) Topical two times a day  docusate sodium 100 milliGRAM(s) Oral three times a day  ferrous    sulfate 325 milliGRAM(s) Oral daily  folic acid 1 milliGRAM(s) Oral daily  gabapentin 200 milliGRAM(s) Oral three times a day  melatonin 3 milliGRAM(s) Oral at bedtime  minocycline 100 milliGRAM(s) Oral two times a day  multivitamin 1 Tablet(s) Oral daily  nystatin    Suspension 459406 Unit(s) Oral every 6 hours  pantoprazole    Tablet 40 milliGRAM(s) Oral before breakfast  polyethylene glycol 3350 17 Gram(s) Oral daily  povidone iodine 10% Solution 1 Application(s) Topical every 12 hours  tiotropium 18 MICROgram(s) Capsule 1 Capsule(s) Inhalation daily  warfarin 0.5 milliGRAM(s) Oral once  zinc oxide 20% Ointment 1 Application(s) Topical daily    MEDICATIONS  (PRN):  acetaminophen   Tablet 650 milliGRAM(s) Oral every 6 hours PRN For Temp greater than 38 C (100.4 F)  acetaminophen   Tablet. 650 milliGRAM(s) Oral every 6 hours PRN Mild Pain (1 - 3)  bisacodyl Suppository 10 milliGRAM(s) Rectal daily PRN Constipation  HYDROmorphone   Tablet 4 milliGRAM(s) Oral every 4 hours PRN Severe Pain (7 - 10)  HYDROmorphone   Tablet 2 milliGRAM(s) Oral every 4 hours PRN Moderate Pain (4 - 6)  HYDROmorphone  Injectable 0.5 milliGRAM(s) IV Push daily PRN Dressing change  lidocaine 2% Gel 1 Application(s) Topical daily PRN dressing change  lidocaine 2% Viscous 10 milliLiter(s) Swish and Spit every 6 hours PRN Mouth Sores  senna 2 Tablet(s) Oral at bedtime PRN Constipation      Care Discussed with Consultants/Other Providers [x] YES  [ ] NO    HEALTH ISSUES - PROBLEM Dx:  Pain: Pain  Adjustment disorder, unspecified type  Delirium due to another medical condition  Pneumonia: Pneumonia  Aortic stenosis, severe: Aortic stenosis, severe  MYAH (obstructive sleep apnea): MYAH (obstructive sleep apnea)  Obesity: Obesity  Asthma: Asthma  SOB (shortness of breath): SOB (shortness of breath)  Chronic deep vein thrombosis (DVT) of lower extremity, unspecified laterality, unspecified vein: Chronic deep vein thrombosis (DVT) of lower extremity, unspecified laterality, unspecified vein  Arthralgia of knee, unspecified laterality: Arthralgia of knee, unspecified laterality  Hyperlipidemia, unspecified hyperlipidemia type: Hyperlipidemia, unspecified hyperlipidemia type  Asthma, unspecified asthma severity, unspecified whether complicated, unspecified whether persistent: Asthma, unspecified asthma severity, unspecified whether complicated, unspecified whether persistent  Coronary artery disease involving native coronary artery of native heart without angina pectoris: Coronary artery disease involving native coronary artery of native heart without angina pectoris  Gastroesophageal reflux disease, esophagitis presence not specified: Gastroesophageal reflux disease, esophagitis presence not specified  Fever, unspecified fever cause: Fever, unspecified fever cause

## 2018-05-19 NOTE — PROGRESS NOTE ADULT - SUBJECTIVE AND OBJECTIVE BOX
PLASTIC SURGERY DAILY PROGRESS NOTE:    SUBJECTIVE:  Patient seen and examined. No acute events overnight.     OBJECTIVE:   Vital Signs Last 24 Hrs  T(C): 36.6 (19 May 2018 05:47), Max: 36.6 (19 May 2018 05:47)  T(F): 97.8 (19 May 2018 05:47), Max: 97.8 (19 May 2018 05:47)  HR: 75 (19 May 2018 05:47) (75 - 80)  BP: 139/67 (19 May 2018 05:47) (127/69 - 139/67)  BP(mean): --  RR: 18 (19 May 2018 05:47) (18 - 18)  SpO2: 95% (19 May 2018 05:47) (94% - 96%)    I&O's Detail    18 May 2018 07:01  -  19 May 2018 07:00  --------------------------------------------------------  IN:    Oral Fluid: 960 mL  Total IN: 960 mL    OUT:    Voided: 200 mL  Total OUT: 200 mL    Total NET: 760 mL    MEDICATIONS  (STANDING):  ascorbic acid 500 milliGRAM(s) Oral daily  aspirin enteric coated 81 milliGRAM(s) Oral daily  buDESOnide 160 MICROgram(s)/formoterol 4.5 MICROgram(s) Inhaler 2 Puff(s) Inhalation two times a day  clotrimazole/betamethasone Cream 1 Application(s) Topical two times a day  docusate sodium 100 milliGRAM(s) Oral three times a day  ferrous    sulfate 325 milliGRAM(s) Oral daily  folic acid 1 milliGRAM(s) Oral daily  gabapentin 200 milliGRAM(s) Oral three times a day  melatonin 3 milliGRAM(s) Oral at bedtime  minocycline 100 milliGRAM(s) Oral two times a day  multivitamin 1 Tablet(s) Oral daily  nystatin    Suspension 622599 Unit(s) Oral every 6 hours  pantoprazole    Tablet 40 milliGRAM(s) Oral before breakfast  polyethylene glycol 3350 17 Gram(s) Oral daily  povidone iodine 10% Solution 1 Application(s) Topical every 12 hours  tiotropium 18 MICROgram(s) Capsule 1 Capsule(s) Inhalation daily  warfarin 0.5 milliGRAM(s) Oral once  zinc oxide 20% Ointment 1 Application(s) Topical daily    MEDICATIONS  (PRN):  acetaminophen   Tablet 650 milliGRAM(s) Oral every 6 hours PRN For Temp greater than 38 C (100.4 F)  acetaminophen   Tablet. 650 milliGRAM(s) Oral every 6 hours PRN Mild Pain (1 - 3)  bisacodyl Suppository 10 milliGRAM(s) Rectal daily PRN Constipation  HYDROmorphone   Tablet 4 milliGRAM(s) Oral every 4 hours PRN Severe Pain (7 - 10)  HYDROmorphone   Tablet 2 milliGRAM(s) Oral every 4 hours PRN Moderate Pain (4 - 6)  HYDROmorphone  Injectable 0.5 milliGRAM(s) IV Push daily PRN Dressing change  lidocaine 2% Gel 1 Application(s) Topical daily PRN dressing change  lidocaine 2% Viscous 10 milliLiter(s) Swish and Spit every 6 hours PRN Mouth Sores  senna 2 Tablet(s) Oral at bedtime PRN Constipation    ** PHYSICAL EXAM **  -- CONSTITUTIONAL: AOx3. NAD.   -- ABDOMEN: wound with NPWT, holding suction  -- EXTREMITIES: The right thigh wound has a VAC in place. Stable eschar overlying patella, painted with betadine.  Medial wound with wet to dry gauze.

## 2018-05-19 NOTE — PROGRESS NOTE ADULT - ASSESSMENT
no plan for orthopedic surgical intervention per family and patient preference  local wound care to knee and vac therapy to abd/thigh per plastics and wound care teams  PO abx per ID team  encourage patient to spend majority of bed oob in bedside chair as able  PT to help mobilize, she must remain 50% wb on the RLE and NO KNEE MOTION allowed, knee immobilizer if oob  coumadin, inr goal 2-3  continue to optimize nutrition  continue to involve psych	  dispo planning, hopefully to snf next week if cleared by wound care/plastic surgery team    Nate Bass MD

## 2018-05-19 NOTE — PROGRESS NOTE ADULT - ASSESSMENT
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     # Recent septic Rt TKR - abx spacer exchanged and plastics complex wound closure  # PMR on chronic steroids  # thigh wound   # right knee eschar, right thigh necrosis and abd wound   # Anemia    Plan:  Plastics and otho on the case. f/u appreciated.  cont local wound care as family declining surgery  s/p multiple debridements, local wound care  wound vac for thigh wound and abd wound  remains afebrile, cont suppressive minocycline, appreciate ID f/u.   appreciate psych eval  heme follow up, epo if hgb <8  cont coumadin and asa  appreciate pain management recs  PT OOB however, pt resistant, dw patient importance of turning and sitting in chair  bowel regimen

## 2018-05-19 NOTE — PROGRESS NOTE ADULT - ASSESSMENT
A: 77F with complex PMH/PSH who recently had placement of a right knee antibiotic spacer followed by PRS closure c/b skin necrosis and formation of an eschar over the anterior knee joint. The patient was readmitted with fevers and right thigh cellulitis which was complicated by skin breakdown. Patient also developed wound at the right/mid lower quadrant of the abdomen.    P:  >> Dressing change TID to knee  >> Betadine to eschar bid  >> VACs on abdomen and R thigh to be changed Monday  >> Medical optimization as per primary team.  >> DVT prophylaxis.  >> Protein supplementation of diet to encourage wound healing.   >> Discussed with Dr. Mg.     [facility] Plastic Surgery Pager -- [phone]  [facility] Plastic Surgery Pager -- [phone]

## 2018-05-20 LAB
ANION GAP SERPL CALC-SCNC: 7 MMOL/L — SIGNIFICANT CHANGE UP (ref 5–17)
APTT BLD: 43.5 SEC — HIGH (ref 27.5–37.4)
BUN SERPL-MCNC: 20 MG/DL — SIGNIFICANT CHANGE UP (ref 7–23)
CALCIUM SERPL-MCNC: 9 MG/DL — SIGNIFICANT CHANGE UP (ref 8.4–10.5)
CHLORIDE SERPL-SCNC: 100 MMOL/L — SIGNIFICANT CHANGE UP (ref 96–108)
CO2 SERPL-SCNC: 32 MMOL/L — HIGH (ref 22–31)
CREAT SERPL-MCNC: 0.56 MG/DL — SIGNIFICANT CHANGE UP (ref 0.5–1.3)
GLUCOSE SERPL-MCNC: 137 MG/DL — HIGH (ref 70–99)
HCT VFR BLD CALC: 26.4 % — LOW (ref 34.5–45)
HGB BLD-MCNC: 8.4 G/DL — LOW (ref 11.5–15.5)
INR BLD: 2.3 RATIO — HIGH (ref 0.88–1.16)
MCHC RBC-ENTMCNC: 28.4 PG — SIGNIFICANT CHANGE UP (ref 27–34)
MCHC RBC-ENTMCNC: 31.7 GM/DL — LOW (ref 32–36)
MCV RBC AUTO: 89.7 FL — SIGNIFICANT CHANGE UP (ref 80–100)
PLATELET # BLD AUTO: 316 K/UL — SIGNIFICANT CHANGE UP (ref 150–400)
POTASSIUM SERPL-MCNC: 4.3 MMOL/L — SIGNIFICANT CHANGE UP (ref 3.5–5.3)
POTASSIUM SERPL-SCNC: 4.3 MMOL/L — SIGNIFICANT CHANGE UP (ref 3.5–5.3)
PROTHROM AB SERPL-ACNC: 26.4 SEC — HIGH (ref 10–13.1)
RBC # BLD: 2.95 M/UL — LOW (ref 3.8–5.2)
RBC # FLD: 16.7 % — HIGH (ref 10.3–14.5)
SODIUM SERPL-SCNC: 139 MMOL/L — SIGNIFICANT CHANGE UP (ref 135–145)
WBC # BLD: 11.5 K/UL — HIGH (ref 3.8–10.5)
WBC # FLD AUTO: 11.5 K/UL — HIGH (ref 3.8–10.5)

## 2018-05-20 RX ORDER — ERYTHROPOIETIN 10000 [IU]/ML
20000 INJECTION, SOLUTION INTRAVENOUS; SUBCUTANEOUS
Qty: 0 | Refills: 0 | Status: DISCONTINUED | OUTPATIENT
Start: 2018-05-20 | End: 2018-06-06

## 2018-05-20 RX ORDER — LACTULOSE 10 G/15ML
10 SOLUTION ORAL EVERY 6 HOURS
Qty: 0 | Refills: 0 | Status: COMPLETED | OUTPATIENT
Start: 2018-05-20 | End: 2018-05-21

## 2018-05-20 RX ORDER — WARFARIN SODIUM 2.5 MG/1
0.5 TABLET ORAL ONCE
Qty: 0 | Refills: 0 | Status: COMPLETED | OUTPATIENT
Start: 2018-05-20 | End: 2018-05-20

## 2018-05-20 RX ADMIN — HYDROMORPHONE HYDROCHLORIDE 4 MILLIGRAM(S): 2 INJECTION INTRAMUSCULAR; INTRAVENOUS; SUBCUTANEOUS at 05:15

## 2018-05-20 RX ADMIN — Medication 100 MILLIGRAM(S): at 13:16

## 2018-05-20 RX ADMIN — Medication 500000 UNIT(S): at 18:14

## 2018-05-20 RX ADMIN — BUDESONIDE AND FORMOTEROL FUMARATE DIHYDRATE 2 PUFF(S): 160; 4.5 AEROSOL RESPIRATORY (INHALATION) at 18:13

## 2018-05-20 RX ADMIN — HYDROMORPHONE HYDROCHLORIDE 4 MILLIGRAM(S): 2 INJECTION INTRAMUSCULAR; INTRAVENOUS; SUBCUTANEOUS at 21:02

## 2018-05-20 RX ADMIN — LACTULOSE 10 GRAM(S): 10 SOLUTION ORAL at 23:29

## 2018-05-20 RX ADMIN — Medication 500000 UNIT(S): at 05:15

## 2018-05-20 RX ADMIN — Medication 100 MILLIGRAM(S): at 05:16

## 2018-05-20 RX ADMIN — GABAPENTIN 200 MILLIGRAM(S): 400 CAPSULE ORAL at 13:14

## 2018-05-20 RX ADMIN — ERYTHROPOIETIN 20000 UNIT(S): 10000 INJECTION, SOLUTION INTRAVENOUS; SUBCUTANEOUS at 18:33

## 2018-05-20 RX ADMIN — Medication 3 MILLIGRAM(S): at 21:25

## 2018-05-20 RX ADMIN — Medication 325 MILLIGRAM(S): at 13:15

## 2018-05-20 RX ADMIN — Medication 500 MILLIGRAM(S): at 13:13

## 2018-05-20 RX ADMIN — TIOTROPIUM BROMIDE 1 CAPSULE(S): 18 CAPSULE ORAL; RESPIRATORY (INHALATION) at 13:15

## 2018-05-20 RX ADMIN — HYDROMORPHONE HYDROCHLORIDE 4 MILLIGRAM(S): 2 INJECTION INTRAMUSCULAR; INTRAVENOUS; SUBCUTANEOUS at 13:41

## 2018-05-20 RX ADMIN — CLOTRIMAZOLE AND BETAMETHASONE DIPROPIONATE 1 APPLICATION(S): 10; .5 CREAM TOPICAL at 18:14

## 2018-05-20 RX ADMIN — Medication 1 TABLET(S): at 13:14

## 2018-05-20 RX ADMIN — WARFARIN SODIUM 0.5 MILLIGRAM(S): 2.5 TABLET ORAL at 21:25

## 2018-05-20 RX ADMIN — PANTOPRAZOLE SODIUM 40 MILLIGRAM(S): 20 TABLET, DELAYED RELEASE ORAL at 05:16

## 2018-05-20 RX ADMIN — ZINC OXIDE 1 APPLICATION(S): 200 OINTMENT TOPICAL at 13:16

## 2018-05-20 RX ADMIN — Medication 1 MILLIGRAM(S): at 13:16

## 2018-05-20 RX ADMIN — Medication 1 APPLICATION(S): at 18:33

## 2018-05-20 RX ADMIN — POLYETHYLENE GLYCOL 3350 17 GRAM(S): 17 POWDER, FOR SOLUTION ORAL at 13:15

## 2018-05-20 RX ADMIN — Medication 500000 UNIT(S): at 23:31

## 2018-05-20 RX ADMIN — MINOCYCLINE HYDROCHLORIDE 100 MILLIGRAM(S): 45 TABLET, EXTENDED RELEASE ORAL at 05:16

## 2018-05-20 RX ADMIN — MINOCYCLINE HYDROCHLORIDE 100 MILLIGRAM(S): 45 TABLET, EXTENDED RELEASE ORAL at 18:15

## 2018-05-20 RX ADMIN — GABAPENTIN 200 MILLIGRAM(S): 400 CAPSULE ORAL at 05:16

## 2018-05-20 RX ADMIN — GABAPENTIN 200 MILLIGRAM(S): 400 CAPSULE ORAL at 21:25

## 2018-05-20 RX ADMIN — CLOTRIMAZOLE AND BETAMETHASONE DIPROPIONATE 1 APPLICATION(S): 10; .5 CREAM TOPICAL at 05:18

## 2018-05-20 RX ADMIN — Medication 81 MILLIGRAM(S): at 13:13

## 2018-05-20 RX ADMIN — BUDESONIDE AND FORMOTEROL FUMARATE DIHYDRATE 2 PUFF(S): 160; 4.5 AEROSOL RESPIRATORY (INHALATION) at 05:16

## 2018-05-20 RX ADMIN — Medication 500000 UNIT(S): at 13:15

## 2018-05-20 RX ADMIN — HYDROMORPHONE HYDROCHLORIDE 4 MILLIGRAM(S): 2 INJECTION INTRAMUSCULAR; INTRAVENOUS; SUBCUTANEOUS at 13:11

## 2018-05-20 RX ADMIN — HYDROMORPHONE HYDROCHLORIDE 4 MILLIGRAM(S): 2 INJECTION INTRAMUSCULAR; INTRAVENOUS; SUBCUTANEOUS at 06:15

## 2018-05-20 RX ADMIN — HYDROMORPHONE HYDROCHLORIDE 4 MILLIGRAM(S): 2 INJECTION INTRAMUSCULAR; INTRAVENOUS; SUBCUTANEOUS at 20:32

## 2018-05-20 RX ADMIN — Medication 100 MILLIGRAM(S): at 21:25

## 2018-05-20 RX ADMIN — Medication 1 APPLICATION(S): at 05:17

## 2018-05-20 NOTE — PROGRESS NOTE ADULT - ASSESSMENT
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     # Recent septic Rt TKR - abx spacer exchanged and plastics complex wound closure  # PMR on chronic steroids  # right knee eschar, right thigh necrosis and abd wound   # Anemia chronic disease    Plan:  Plastics and otho on the case. f/u appreciated  cont local wound care as family declining surgery  s/p multiple debridements, local wound care  wound vac for thigh wound and abd wound  remains afebrile, cont suppressive minocycline, appreciate ID f/u  appreciate psych eval  heme follow up, dw Dr. Lau, will give procrit 20,000u weekly for anemia chronic disease and to promote wound healing  cont coumadin and asa  appreciate pain management recs  PT OOB however, pt resistant, dw patient importance of turning and sitting in chair  bowel regimen    plan of care dw patient who is agreeable

## 2018-05-20 NOTE — PROGRESS NOTE ADULT - SUBJECTIVE AND OBJECTIVE BOX
Patient is a 77y old  Female who presents with a chief complaint of fever (11 Apr 2018 14:25)      SUBJECTIVE / OVERNIGHT EVENTS:    Patient seen and examined. denies cp sob. no acute events.      Vital Signs Last 24 Hrs  T(C): 36.8 (20 May 2018 05:00), Max: 37.1 (19 May 2018 11:05)  T(F): 98.2 (20 May 2018 05:00), Max: 98.7 (19 May 2018 11:05)  HR: 77 (20 May 2018 05:00) (73 - 78)  BP: 145/78 (20 May 2018 05:00) (117/70 - 145/78)  BP(mean): --  RR: 18 (20 May 2018 05:00) (18 - 18)  SpO2: 94% (20 May 2018 05:00) (93% - 94%)  I&O's Summary    19 May 2018 07:01  -  20 May 2018 07:00  --------------------------------------------------------  IN: 360 mL / OUT: 250 mL / NET: 110 mL        PE:  GENERAL: NAD, AAOx3, obese  HEAD:  Atraumatic, Normocephalic  EYES: EOMI, PERRLA, conjunctiva and sclera clear  NECK: Supple, No JVD  CHEST/LUNG: CTABL, No wheeze  HEART: Regular rate and rhythm; no murmur  ABDOMEN: Soft, Nontender, Nondistended; abd wound with vac  EXTREMITIES:  2+ Peripheral Pulses, right upper thigh with vac, right knee dressed  SKIN: No rashes or lesions  NEURO: No focal deficits    LABS:                        7.3    8.79  )-----------( 278      ( 19 May 2018 08:32 )             23.6     05-19    139  |  100  |  17  ----------------------------<  108<H>  3.6   |  32<H>  |  0.59    Ca    8.7      19 May 2018 06:46      PT/INR - ( 20 May 2018 07:52 )   PT: 26.4 sec;   INR: 2.30 ratio         PTT - ( 20 May 2018 07:52 )  PTT:43.5 sec  CAPILLARY BLOOD GLUCOSE                RADIOLOGY & ADDITIONAL TESTS:    Imaging Personally Reviewed:  [x] YES  [ ] NO    Consultant(s) Notes Reviewed:  [x] YES  [ ] NO    MEDICATIONS  (STANDING):  ascorbic acid 500 milliGRAM(s) Oral daily  aspirin enteric coated 81 milliGRAM(s) Oral daily  buDESOnide 160 MICROgram(s)/formoterol 4.5 MICROgram(s) Inhaler 2 Puff(s) Inhalation two times a day  clotrimazole/betamethasone Cream 1 Application(s) Topical two times a day  docusate sodium 100 milliGRAM(s) Oral three times a day  epoetin jenni Injectable 46914 Unit(s) SubCutaneous every 7 days  ferrous    sulfate 325 milliGRAM(s) Oral daily  folic acid 1 milliGRAM(s) Oral daily  gabapentin 200 milliGRAM(s) Oral three times a day  melatonin 3 milliGRAM(s) Oral at bedtime  minocycline 100 milliGRAM(s) Oral two times a day  multivitamin 1 Tablet(s) Oral daily  nystatin    Suspension 489295 Unit(s) Oral every 6 hours  pantoprazole    Tablet 40 milliGRAM(s) Oral before breakfast  polyethylene glycol 3350 17 Gram(s) Oral daily  povidone iodine 10% Solution 1 Application(s) Topical every 12 hours  tiotropium 18 MICROgram(s) Capsule 1 Capsule(s) Inhalation daily  warfarin 0.5 milliGRAM(s) Oral once  zinc oxide 20% Ointment 1 Application(s) Topical daily    MEDICATIONS  (PRN):  acetaminophen   Tablet 650 milliGRAM(s) Oral every 6 hours PRN For Temp greater than 38 C (100.4 F)  acetaminophen   Tablet. 650 milliGRAM(s) Oral every 6 hours PRN Mild Pain (1 - 3)  bisacodyl Suppository 10 milliGRAM(s) Rectal daily PRN Constipation  HYDROmorphone   Tablet 4 milliGRAM(s) Oral every 4 hours PRN Severe Pain (7 - 10)  HYDROmorphone   Tablet 2 milliGRAM(s) Oral every 4 hours PRN Moderate Pain (4 - 6)  HYDROmorphone  Injectable 0.5 milliGRAM(s) IV Push daily PRN Dressing change  lidocaine 2% Gel 1 Application(s) Topical daily PRN dressing change  lidocaine 2% Viscous 10 milliLiter(s) Swish and Spit every 6 hours PRN Mouth Sores  senna 2 Tablet(s) Oral at bedtime PRN Constipation      Care Discussed with Consultants/Other Providers [x] YES  [ ] NO    HEALTH ISSUES - PROBLEM Dx:  Pain: Pain  Adjustment disorder, unspecified type  Delirium due to another medical condition  Pneumonia: Pneumonia  Aortic stenosis, severe: Aortic stenosis, severe  MYAH (obstructive sleep apnea): MYAH (obstructive sleep apnea)  Obesity: Obesity  Asthma: Asthma  SOB (shortness of breath): SOB (shortness of breath)  Chronic deep vein thrombosis (DVT) of lower extremity, unspecified laterality, unspecified vein: Chronic deep vein thrombosis (DVT) of lower extremity, unspecified laterality, unspecified vein  Arthralgia of knee, unspecified laterality: Arthralgia of knee, unspecified laterality  Hyperlipidemia, unspecified hyperlipidemia type: Hyperlipidemia, unspecified hyperlipidemia type  Asthma, unspecified asthma severity, unspecified whether complicated, unspecified whether persistent: Asthma, unspecified asthma severity, unspecified whether complicated, unspecified whether persistent  Coronary artery disease involving native coronary artery of native heart without angina pectoris: Coronary artery disease involving native coronary artery of native heart without angina pectoris  Gastroesophageal reflux disease, esophagitis presence not specified: Gastroesophageal reflux disease, esophagitis presence not specified  Fever, unspecified fever cause: Fever, unspecified fever cause

## 2018-05-20 NOTE — PROGRESS NOTE ADULT - ASSESSMENT
no plan for orthopedic surgical intervention per family and patient preference  local wound care to knee and vac therapy to abd/thigh per plastics and wound care teams  PO abx per ID team  encourage patient to spend majority of bed oob in bedside chair as able  PT to help mobilize, she must remain 50% wb on the RLE and NO KNEE MOTION allowed, knee immobilizer if oob  coumadin, inr goal 2-3  continue to optimize nutrition  continue to involve psych	  dispo planning, hopefully to snf next week if cleared by wound care/plastic surgery team    I discussed case with her  and grandson as well as patient. She has been in a very good mood for the past 5 days and is looking forward to making more progress to get oob to wheelchair if possible.	    Nate Bass MD

## 2018-05-20 NOTE — PROGRESS NOTE ADULT - ASSESSMENT
5/19 called by Dr Marrero as the pt had a hemoglobin of 7.3 and procrit will be started at a dose of 73264 units a week in an attempt to reduce transfusion requirements.

## 2018-05-20 NOTE — PROGRESS NOTE ADULT - SUBJECTIVE AND OBJECTIVE BOX
Pt is a 77 year old female who was admitted several weeks ago with aspiration pneumonia and fever with right knee pain. She also had proximal thigh swelling and there was a hematoma of the leg as well. She has a history of PMR on steroids. She also has a history of tavr, AICD andf remote bilateral TKR and was found to have strep bacteremia and a ERIKA was negative and she also had a eschar of the right knee as well. She was placed on iv antibiotics and I understand that she will be completing the antibiotics, daptomycin on this date. She told me that she had anemia in the distant past and was placed on antibiotics. She is being seen by the plastics service as well. A ct of th femur was not remarkable for infection and a ct of the chest done several weeks ago showed bibasilar PNA. She was also noted to have a left renal lesion that is unchanged now since 12/2016.     The consult was requested to see if there is anything we can do for her anemia. The counts on the day of this admission was white cell count of 9.71 hemoglobin 8.4 hematocrit 26.4 MCV 86 and MCHC 31.8 and platelet count of [id number] the diff count was 74 polys 15 lymphs and 7 monos the bun was 9 and the creatinine was 0.6     5/4 the pt is stable and the iron studies are still pending at this time. The hemoglobin was 8.4. 5/7 the pt was seen and the notes over charu last few days. The pt will in the am have ferritin and iron studies sent off. 5/8 the ferritin came back as over 300 and therefore she is not iron deficient and the anemia is consistent with chronic disease. The use of epogen might be considered here if hemoglobin drops and can be implemented to reduce blood transfusions. 5/9 pt clearly looks better and repeat hemoglobin was stable at 8.1  PE weak appearing with female and needed help to do normal daily activities large eschar as described on the lower leg and at the time of my arrival, the wound vac was in place and she was being cared for by the staff.  5/8 the ferritin came back at over 300 and therefore she is not iron deficient and epo can be considered if the hemoglobin drops and is in need of blood support. 5/10 pt being evaluated for rehab and consider epo of hemoglobin drops below the 8 range. 5/11 notes reviewed and will order cbc on the weekend and decide on epo.  pmh as above in the body of the report  5/14 the hemoglobin remains at 8.1 and debridement and wound vac and abdominal wound and thigh wounds were being cared for. 5/15 stable clinically and looks better the hemoglobin without epo is up to 8.5 5/16 notes and labs reviewed and no change from heme point of view.     5/17 hemoglobin down to 8.1 and stable clinically. 5/19 called by Dr Marrero as the pt had a hemoglobin of 7.3 and procrit will be started [place] dose [place] 29473 units a week in an attempt to reduce transfusion requirements.                        8.4    11.5  )-----------( 316      ( 20 May 2018 10:01 )             26.4   MEDICATIONS  (STANDING):  ascorbic acid 500 milliGRAM(s) Oral daily  aspirin enteric coated 81 milliGRAM(s) Oral daily  buDESOnide 160 MICROgram(s)/formoterol 4.5 MICROgram(s) Inhaler 2 Puff(s) Inhalation two times a day  clotrimazole/betamethasone Cream 1 Application(s) Topical two times a day  docusate sodium 100 milliGRAM(s) Oral three times a day  epoetin jenni Injectable 12430 Unit(s) SubCutaneous every 7 days  ferrous    sulfate 325 milliGRAM(s) Oral daily  folic acid 1 milliGRAM(s) Oral daily  gabapentin 200 milliGRAM(s) Oral three times a day  melatonin 3 milliGRAM(s) Oral at bedtime  minocycline 100 milliGRAM(s) Oral two times a day  multivitamin 1 Tablet(s) Oral daily  nystatin    Suspension 090872 Unit(s) Oral every 6 hours  pantoprazole    Tablet 40 milliGRAM(s) Oral before breakfast  polyethylene glycol 3350 17 Gram(s) Oral daily  povidone iodine 10% Solution 1 Application(s) Topical every 12 hours  tiotropium 18 MICROgram(s) Capsule 1 Capsule(s) Inhalation daily  warfarin 0.5 milliGRAM(s) Oral once  zinc oxide 20% Ointment 1 Application(s) Topical daily  PE pt stable and being cared for by several on the staff at my arrival I will return later.

## 2018-05-20 NOTE — PROGRESS NOTE ADULT - SUBJECTIVE AND OBJECTIVE BOX
Pt seen and examined. Doing well. No acute events o/n.     T(C): 36.8 (05-20-18 @ 05:00), Max: 37.1 (05-19-18 @ 11:05)  T(F): 98.2 (05-20-18 @ 05:00), Max: 98.7 (05-19-18 @ 11:05)  HR: 77 (05-20-18 @ 05:00) (73 - 78)  BP: 145/78 (05-20-18 @ 05:00) (117/70 - 145/78)  RR: 18 (05-20-18 @ 05:00) (18 - 18)  SpO2: 94% (05-20-18 @ 05:00) (93% - 94%)      19 May 2018 07:01  -  20 May 2018 07:00  --------------------------------------------------------  IN:    Oral Fluid: 360 mL  Total IN: 360 mL    OUT:    Voided: 250 mL  Total OUT: 250 mL    Total NET: 110 mL          Exam:  VACs in place, holding suction.  Knee with stable eschar and area of dehiscence.   No purulence.                          7.3    8.79  )-----------( 278      ( 19 May 2018 08:32 )             23.6     05-19    139  |  100  |  17  ----------------------------<  108<H>  3.6   |  32<H>  |  0.59    Ca    8.7      19 May 2018 06:46

## 2018-05-20 NOTE — PROVIDER CONTACT NOTE (MEDICATION) - SITUATION
Patient did not have B/M since 5/15, CORTNEY Tompkins ordered fleet enema and lactulose,but patient wants to wait one more day.

## 2018-05-20 NOTE — PROVIDER CONTACT NOTE (MEDICATION) - ACTION/TREATMENT ORDERED:
No additional action done at this time, will continue to monitor.
no new orders at this time
ok to push meds forward
will order fleet enema, and will continue to follow up
NP notified and made aware, as per NP educate pt and encourage to follow medical tx. no further interventions. will continue to monitor.

## 2018-05-21 LAB
ANION GAP SERPL CALC-SCNC: 8 MMOL/L — SIGNIFICANT CHANGE UP (ref 5–17)
APTT BLD: 41.8 SEC — HIGH (ref 27.5–37.4)
BUN SERPL-MCNC: 19 MG/DL — SIGNIFICANT CHANGE UP (ref 7–23)
CALCIUM SERPL-MCNC: 9.2 MG/DL — SIGNIFICANT CHANGE UP (ref 8.4–10.5)
CHLORIDE SERPL-SCNC: 101 MMOL/L — SIGNIFICANT CHANGE UP (ref 96–108)
CO2 SERPL-SCNC: 32 MMOL/L — HIGH (ref 22–31)
CREAT SERPL-MCNC: 0.58 MG/DL — SIGNIFICANT CHANGE UP (ref 0.5–1.3)
GLUCOSE SERPL-MCNC: 102 MG/DL — HIGH (ref 70–99)
HCT VFR BLD CALC: 24.9 % — LOW (ref 34.5–45)
HGB BLD-MCNC: 7.5 G/DL — LOW (ref 11.5–15.5)
INR BLD: 1.94 RATIO — HIGH (ref 0.88–1.16)
MCHC RBC-ENTMCNC: 26.9 PG — LOW (ref 27–34)
MCHC RBC-ENTMCNC: 30.1 GM/DL — LOW (ref 32–36)
MCV RBC AUTO: 89.2 FL — SIGNIFICANT CHANGE UP (ref 80–100)
PLATELET # BLD AUTO: 287 K/UL — SIGNIFICANT CHANGE UP (ref 150–400)
POTASSIUM SERPL-MCNC: 4.4 MMOL/L — SIGNIFICANT CHANGE UP (ref 3.5–5.3)
POTASSIUM SERPL-SCNC: 4.4 MMOL/L — SIGNIFICANT CHANGE UP (ref 3.5–5.3)
PROTHROM AB SERPL-ACNC: 22.2 SEC — HIGH (ref 10–13.1)
RBC # BLD: 2.79 M/UL — LOW (ref 3.8–5.2)
RBC # FLD: 18.4 % — HIGH (ref 10.3–14.5)
SODIUM SERPL-SCNC: 141 MMOL/L — SIGNIFICANT CHANGE UP (ref 135–145)
WBC # BLD: 8.28 K/UL — SIGNIFICANT CHANGE UP (ref 3.8–10.5)
WBC # FLD AUTO: 8.28 K/UL — SIGNIFICANT CHANGE UP (ref 3.8–10.5)

## 2018-05-21 RX ORDER — WARFARIN SODIUM 2.5 MG/1
0.5 TABLET ORAL ONCE
Qty: 0 | Refills: 0 | Status: COMPLETED | OUTPATIENT
Start: 2018-05-21 | End: 2018-05-21

## 2018-05-21 RX ORDER — HYDROMORPHONE HYDROCHLORIDE 2 MG/ML
0.5 INJECTION INTRAMUSCULAR; INTRAVENOUS; SUBCUTANEOUS ONCE
Qty: 0 | Refills: 0 | Status: DISCONTINUED | OUTPATIENT
Start: 2018-05-21 | End: 2018-05-21

## 2018-05-21 RX ADMIN — HYDROMORPHONE HYDROCHLORIDE 0.5 MILLIGRAM(S): 2 INJECTION INTRAMUSCULAR; INTRAVENOUS; SUBCUTANEOUS at 11:58

## 2018-05-21 RX ADMIN — GABAPENTIN 200 MILLIGRAM(S): 400 CAPSULE ORAL at 05:59

## 2018-05-21 RX ADMIN — PANTOPRAZOLE SODIUM 40 MILLIGRAM(S): 20 TABLET, DELAYED RELEASE ORAL at 06:00

## 2018-05-21 RX ADMIN — HYDROMORPHONE HYDROCHLORIDE 4 MILLIGRAM(S): 2 INJECTION INTRAMUSCULAR; INTRAVENOUS; SUBCUTANEOUS at 06:13

## 2018-05-21 RX ADMIN — Medication 500 MILLIGRAM(S): at 11:29

## 2018-05-21 RX ADMIN — Medication 500000 UNIT(S): at 23:40

## 2018-05-21 RX ADMIN — HYDROMORPHONE HYDROCHLORIDE 0.5 MILLIGRAM(S): 2 INJECTION INTRAMUSCULAR; INTRAVENOUS; SUBCUTANEOUS at 11:28

## 2018-05-21 RX ADMIN — HYDROMORPHONE HYDROCHLORIDE 0.5 MILLIGRAM(S): 2 INJECTION INTRAMUSCULAR; INTRAVENOUS; SUBCUTANEOUS at 09:52

## 2018-05-21 RX ADMIN — Medication 3 MILLIGRAM(S): at 23:39

## 2018-05-21 RX ADMIN — Medication 1 MILLIGRAM(S): at 11:29

## 2018-05-21 RX ADMIN — MINOCYCLINE HYDROCHLORIDE 100 MILLIGRAM(S): 45 TABLET, EXTENDED RELEASE ORAL at 17:45

## 2018-05-21 RX ADMIN — POLYETHYLENE GLYCOL 3350 17 GRAM(S): 17 POWDER, FOR SOLUTION ORAL at 11:30

## 2018-05-21 RX ADMIN — TIOTROPIUM BROMIDE 1 CAPSULE(S): 18 CAPSULE ORAL; RESPIRATORY (INHALATION) at 15:17

## 2018-05-21 RX ADMIN — Medication 100 MILLIGRAM(S): at 23:39

## 2018-05-21 RX ADMIN — HYDROMORPHONE HYDROCHLORIDE 4 MILLIGRAM(S): 2 INJECTION INTRAMUSCULAR; INTRAVENOUS; SUBCUTANEOUS at 06:43

## 2018-05-21 RX ADMIN — Medication 325 MILLIGRAM(S): at 11:29

## 2018-05-21 RX ADMIN — Medication 500000 UNIT(S): at 17:45

## 2018-05-21 RX ADMIN — ZINC OXIDE 1 APPLICATION(S): 200 OINTMENT TOPICAL at 11:30

## 2018-05-21 RX ADMIN — Medication 100 MILLIGRAM(S): at 06:00

## 2018-05-21 RX ADMIN — CLOTRIMAZOLE AND BETAMETHASONE DIPROPIONATE 1 APPLICATION(S): 10; .5 CREAM TOPICAL at 17:46

## 2018-05-21 RX ADMIN — Medication 500000 UNIT(S): at 11:29

## 2018-05-21 RX ADMIN — GABAPENTIN 200 MILLIGRAM(S): 400 CAPSULE ORAL at 23:39

## 2018-05-21 RX ADMIN — Medication 1 APPLICATION(S): at 05:59

## 2018-05-21 RX ADMIN — BUDESONIDE AND FORMOTEROL FUMARATE DIHYDRATE 2 PUFF(S): 160; 4.5 AEROSOL RESPIRATORY (INHALATION) at 17:46

## 2018-05-21 RX ADMIN — GABAPENTIN 200 MILLIGRAM(S): 400 CAPSULE ORAL at 15:17

## 2018-05-21 RX ADMIN — WARFARIN SODIUM 0.5 MILLIGRAM(S): 2.5 TABLET ORAL at 23:46

## 2018-05-21 RX ADMIN — Medication 1 TABLET(S): at 11:29

## 2018-05-21 RX ADMIN — Medication 81 MILLIGRAM(S): at 11:29

## 2018-05-21 RX ADMIN — Medication 100 MILLIGRAM(S): at 15:17

## 2018-05-21 RX ADMIN — LACTULOSE 10 GRAM(S): 10 SOLUTION ORAL at 05:59

## 2018-05-21 RX ADMIN — Medication 500000 UNIT(S): at 06:02

## 2018-05-21 RX ADMIN — BUDESONIDE AND FORMOTEROL FUMARATE DIHYDRATE 2 PUFF(S): 160; 4.5 AEROSOL RESPIRATORY (INHALATION) at 05:59

## 2018-05-21 RX ADMIN — CLOTRIMAZOLE AND BETAMETHASONE DIPROPIONATE 1 APPLICATION(S): 10; .5 CREAM TOPICAL at 06:00

## 2018-05-21 RX ADMIN — HYDROMORPHONE HYDROCHLORIDE 0.5 MILLIGRAM(S): 2 INJECTION INTRAMUSCULAR; INTRAVENOUS; SUBCUTANEOUS at 10:22

## 2018-05-21 RX ADMIN — Medication 1 APPLICATION(S): at 19:23

## 2018-05-21 RX ADMIN — MINOCYCLINE HYDROCHLORIDE 100 MILLIGRAM(S): 45 TABLET, EXTENDED RELEASE ORAL at 06:00

## 2018-05-21 NOTE — PROGRESS NOTE ADULT - ASSESSMENT
5/19 called by Dr Marrero as the pt had a hemoglobin of 7.3 and procrit will be started at a dose [place] 13067 units a week in an attempt to reduce transfusion requirements.

## 2018-05-21 NOTE — PROGRESS NOTE ADULT - SUBJECTIVE AND OBJECTIVE BOX
Pt is a 77 year old female who was admitted several weeks ago with aspiration pneumonia and fever with right knee pain. She also had proximal thigh swelling and there was a hematoma of the leg as well. She has a history of PMR on steroids. She also has a history of tavr, AICD andf remote bilateral TKR and was found to have strep bacteremia and a ERIKA was negative and she also had a eschar of the right knee as well. She was placed on iv antibiotics and I understand that she will be completing the antibiotics, daptomycin on this date. She told me that she had anemia in the distant past and was placed on antibiotics. She is being seen by the plastics service as well. A ct of th femur was not remarkable for infection and a ct of the chest done several weeks ago showed bibasilar PNA. She was also noted to have a left renal lesion that is unchanged now since 12/2016.     The consult was requested to see if there is anything we can do for her anemia. The counts on the day of this admission was white cell count of 9.71 hemoglobin 8.4 hematocrit 26.4 MCV 86 and MCHC 31.8 and platelet count of [id number] the diff count was 74 polys 15 lymphs and 7 monos the bun was 9 and the creatinine was 0.6     5/4 the pt is stable and the iron studies are still pending at this time. The hemoglobin was 8.4. 5/7 the pt was seen and the notes over charu last few days. The pt will in the am have ferritin and iron studies sent off. 5/8 the ferritin came back as over 300 and therefore she is not iron deficient and the anemia is consistent with chronic disease. The use of epogen might be considered here if hemoglobin drops and can be implemented to reduce blood transfusions. 5/9 pt clearly looks better and repeat hemoglobin was stable at 8.1  PE weak appearing with female and needed help to do normal daily activities large eschar as described on the lower leg and at the time of my arrival, the wound vac was in place and she was being cared for by the staff.  5/8 the ferritin came back at over 300 and therefore she is not iron deficient and epo can be considered if the hemoglobin drops and is in need of blood support. 5/10 pt being evaluated for rehab and consider epo of hemoglobin drops below the 8 range. 5/11 notes reviewed and will order cbc on the weekend and decide on epo.  pmh as above in the body of the report  5/14 the hemoglobin remains at 8.1 and debridement and wound vac and abdominal wound and thigh wounds were being cared for. 5/15 stable clinically and looks better the hemoglobin without epo is up to 8.5 5/16 notes and labs reviewed and no change from heme point of view.     5/17 hemoglobin down to 8.1 and stable clinically. 5/19 called by Dr Marrero as the pt had a hemoglobin of 7.3 and procrit will be started at a dose [place] 34991 units a week in an attempt to reduce transfusion requirements. 5/21 hemoglobom 7.5 and stable on procrit now notes reviewed    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  Artificial cardiac pacemaker: 12/25/17  H/O cardiac catheterization: 11/29/16 HERBERT placed on LAD  H/O endoscopy: with dilatation of esophageal stricture 2013  S/P cataract surgery: x2; 3-4yr ago  S/P gastroplasty: 28 yrs ago  S/P cholecystectomy: more than 15 years ago  S/P total knee replacement: left, 15yr ago  S/P total knee replacement: right, 12yr ago  S/P hysterectomy: 24yr ago  MEDICATIONS  (STANDING):  ascorbic acid 500 milliGRAM(s) Oral daily  aspirin enteric coated 81 milliGRAM(s) Oral daily  buDESOnide 160 MICROgram(s)/formoterol 4.5 MICROgram(s) Inhaler 2 Puff(s) Inhalation two times a day  clotrimazole/betamethasone Cream 1 Application(s) Topical two times a day  docusate sodium 100 milliGRAM(s) Oral three times a day  epoetin jenni Injectable 84913 Unit(s) SubCutaneous every 7 days  ferrous    sulfate 325 milliGRAM(s) Oral daily  folic acid 1 milliGRAM(s) Oral daily  gabapentin 200 milliGRAM(s) Oral three times a day  melatonin 3 milliGRAM(s) Oral at bedtime  minocycline 100 milliGRAM(s) Oral two times a day  multivitamin 1 Tablet(s) Oral daily  nystatin    Suspension 099290 Unit(s) Oral every 6 hours  pantoprazole    Tablet 40 milliGRAM(s) Oral before breakfast  polyethylene glycol 3350 17 Gram(s) Oral daily  povidone iodine 10% Solution 1 Application(s) Topical every 12 hours  tiotropium 18 MICROgram(s) Capsule 1 Capsule(s) Inhalation daily  warfarin 0.5 milliGRAM(s) Oral once  zinc oxide 20% Ointment 1 Application(s) Topical daily                        7.5    8.28  )-----------( 287      ( 21 May 2018 07:53 )             24.9   PE being cared for by the staff and appeared to be stable.

## 2018-05-21 NOTE — PROGRESS NOTE ADULT - ASSESSMENT
A: 77F with complex PMH/PSH who recently had placement of a right knee antibiotic spacer followed by PRS closure c/b skin necrosis and formation of an eschar over the anterior knee joint. The patient was readmitted with fevers and right thigh cellulitis which was complicated by skin breakdown. Patient also developed wound at the right/mid lower quadrant of the abdomen.    P:  >> Dressing change TID to knee  >> Betadine to eschar bid  >> VACs on abdomen and R thigh to be changed today  >> Medical optimization as per primary team.  >> DVT prophylaxis.  >> Protein supplementation of diet to encourage wound healing.   >> Med/Pulm and Cards clearance for possible OR    [facility] Plastic Surgery Pager -- [phone]  [facility] Plastic Surgery Pager -- [phone]

## 2018-05-21 NOTE — PROGRESS NOTE ADULT - ASSESSMENT
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     # Recent septic Rt TKR - abx spacer exchanged and plastics complex wound closure  # PMR on chronic steroids  # right knee eschar, right thigh necrosis and abd wound   # Anemia chronic disease    Plan:  wound care and wound vac per plastics and ortho  s/p multiple debridements  remains afebrile, cont suppressive minocycline, appreciate ID f/u  appreciate heme recs, started on procrit for anemia of chronic disease  cont coumadin and asa  cardiac clx note on 4/18  appreciate pain management recs  PT OOB no knee motion, immobilizer if out of bed  bowel regimen    plan of care dw patient 76 yo F PMHx including HLD, GERD, Anxiety, Anemia, CAD s/p HERBERT, severe AS (s/p TAVR & PPM 12/17 [employer] Model [id number]), 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     # Recent septic Rt TKR - abx spacer exchanged and plastics complex wound closure  # PMR on chronic steroids  # right knee eschar, right thigh necrosis and abd wound   # Anemia chronic disease    Plan:  wound care and wound vac per plastics and ortho  s/p multiple debridements  remains afebrile, cont suppressive minocycline, appreciate ID f/u  appreciate heme recs, started on procrit for anemia of chronic disease  cont coumadin and asa  dw plastics and considering OR debridement with anesthesia for abdominal wound - pre-op eval patient is without active cardiac conditions but has significant RF including heart disease, AS sp TAVR, <1 METS, at least moderate risk to high risk for intermediate risk procedure  appreciate pain management recs  PT OOB no knee motion, immobilizer if out of bed  bowel regimen    plan of care dw patient and NP

## 2018-05-21 NOTE — PROGRESS NOTE ADULT - SUBJECTIVE AND OBJECTIVE BOX
Patient is a 77y old  Female who presents with a chief complaint of fever (11 Apr 2018 14:25)      SUBJECTIVE / OVERNIGHT EVENTS:    Patient seen and examined. denies cp sob nvd.      Vital Signs Last 24 Hrs  T(C): 36.3 (21 May 2018 05:54), Max: 36.7 (20 May 2018 14:25)  T(F): 97.3 (21 May 2018 05:54), Max: 98.1 (20 May 2018 14:25)  HR: 68 (21 May 2018 05:54) (64 - 75)  BP: 109/75 (21 May 2018 05:54) (109/75 - 165/81)  BP(mean): --  RR: 18 (21 May 2018 05:54) (18 - 18)  SpO2: 100% (21 May 2018 05:54) (95% - 100%)  I&O's Summary    20 May 2018 07:01  -  21 May 2018 07:00  --------------------------------------------------------  IN: 880 mL / OUT: 350 mL / NET: 530 mL        PE:  GENERAL: NAD, AAOx3, obese  HEAD:  Atraumatic, Normocephalic  EYES: EOMI, PERRLA, conjunctiva and sclera clear  NECK: Supple, No JVD  CHEST/LUNG: CTABL, No wheeze  HEART: Regular rate and rhythm; no murmur  ABDOMEN: Soft, Nontender, Nondistended; abd wound with vac  EXTREMITIES:  2+ Peripheral Pulses, right upper thigh with vac, right knee dressed  SKIN: No rashes or lesions  NEURO: No focal deficits    LABS:                        7.5    8.28  )-----------( 287      ( 21 May 2018 07:53 )             24.9     05-21    141  |  101  |  19  ----------------------------<  102<H>  4.4   |  32<H>  |  0.58    Ca    9.2      21 May 2018 07:17      PT/INR - ( 20 May 2018 07:52 )   PT: 26.4 sec;   INR: 2.30 ratio         PTT - ( 20 May 2018 07:52 )  PTT:43.5 sec  CAPILLARY BLOOD GLUCOSE                RADIOLOGY & ADDITIONAL TESTS:    Imaging Personally Reviewed:  [x] YES  [ ] NO    Consultant(s) Notes Reviewed:  [x] YES  [ ] NO    MEDICATIONS  (STANDING):  ascorbic acid 500 milliGRAM(s) Oral daily  aspirin enteric coated 81 milliGRAM(s) Oral daily  buDESOnide 160 MICROgram(s)/formoterol 4.5 MICROgram(s) Inhaler 2 Puff(s) Inhalation two times a day  clotrimazole/betamethasone Cream 1 Application(s) Topical two times a day  docusate sodium 100 milliGRAM(s) Oral three times a day  epoetin jenni Injectable 11117 Unit(s) SubCutaneous every 7 days  ferrous    sulfate 325 milliGRAM(s) Oral daily  folic acid 1 milliGRAM(s) Oral daily  gabapentin 200 milliGRAM(s) Oral three times a day  lactulose Syrup 10 Gram(s) Oral every 6 hours  melatonin 3 milliGRAM(s) Oral at bedtime  minocycline 100 milliGRAM(s) Oral two times a day  multivitamin 1 Tablet(s) Oral daily  nystatin    Suspension 228803 Unit(s) Oral every 6 hours  pantoprazole    Tablet 40 milliGRAM(s) Oral before breakfast  polyethylene glycol 3350 17 Gram(s) Oral daily  povidone iodine 10% Solution 1 Application(s) Topical every 12 hours  tiotropium 18 MICROgram(s) Capsule 1 Capsule(s) Inhalation daily  zinc oxide 20% Ointment 1 Application(s) Topical daily    MEDICATIONS  (PRN):  acetaminophen   Tablet 650 milliGRAM(s) Oral every 6 hours PRN For Temp greater than 38 C (100.4 F)  acetaminophen   Tablet. 650 milliGRAM(s) Oral every 6 hours PRN Mild Pain (1 - 3)  bisacodyl Suppository 10 milliGRAM(s) Rectal daily PRN Constipation  HYDROmorphone   Tablet 4 milliGRAM(s) Oral every 4 hours PRN Severe Pain (7 - 10)  HYDROmorphone   Tablet 2 milliGRAM(s) Oral every 4 hours PRN Moderate Pain (4 - 6)  HYDROmorphone  Injectable 0.5 milliGRAM(s) IV Push daily PRN Dressing change  lidocaine 2% Gel 1 Application(s) Topical daily PRN dressing change  lidocaine 2% Viscous 10 milliLiter(s) Swish and Spit every 6 hours PRN Mouth Sores  senna 2 Tablet(s) Oral at bedtime PRN Constipation      Care Discussed with Consultants/Other Providers [x] YES  [ ] NO    HEALTH ISSUES - PROBLEM Dx:  Pain: Pain  Adjustment disorder, unspecified type  Delirium due to another medical condition  Pneumonia: Pneumonia  Aortic stenosis, severe: Aortic stenosis, severe  MYAH (obstructive sleep apnea): MYAH (obstructive sleep apnea)  Obesity: Obesity  Asthma: Asthma  SOB (shortness of breath): SOB (shortness of breath)  Chronic deep vein thrombosis (DVT) of lower extremity, unspecified laterality, unspecified vein: Chronic deep vein thrombosis (DVT) of lower extremity, unspecified laterality, unspecified vein  Arthralgia of knee, unspecified laterality: Arthralgia of knee, unspecified laterality  Hyperlipidemia, unspecified hyperlipidemia type: Hyperlipidemia, unspecified hyperlipidemia type  Asthma, unspecified asthma severity, unspecified whether complicated, unspecified whether persistent: Asthma, unspecified asthma severity, unspecified whether complicated, unspecified whether persistent  Coronary artery disease involving native coronary artery of native heart without angina pectoris: Coronary artery disease involving native coronary artery of native heart without angina pectoris  Gastroesophageal reflux disease, esophagitis presence not specified: Gastroesophageal reflux disease, esophagitis presence not specified  Fever, unspecified fever cause: Fever, unspecified fever cause

## 2018-05-21 NOTE — PROGRESS NOTE ADULT - SUBJECTIVE AND OBJECTIVE BOX
PLASTIC SURGERY DAILY PROGRESS NOTE:    SUBJECTIVE:  Patient seen and examined. No acute events overnight.     OBJECTIVE:   Vital Signs Last 24 Hrs  T(C): 36.3 (21 May 2018 05:54), Max: 36.7 (20 May 2018 14:25)  T(F): 97.3 (21 May 2018 05:54), Max: 98.1 (20 May 2018 14:25)  HR: 68 (21 May 2018 05:54) (64 - 75)  BP: 109/75 (21 May 2018 05:54) (109/75 - 165/81)  BP(mean): --  RR: 18 (21 May 2018 05:54) (18 - 18)  SpO2: 100% (21 May 2018 05:54) (95% - 100%)    I&O's Detail    20 May 2018 07:01  -  21 May 2018 07:00  --------------------------------------------------------  IN:    Oral Fluid: 880 mL  Total IN: 880 mL    OUT:    Voided: 350 mL  Total OUT: 350 mL    Total NET: 530 mL    MEDICATIONS  (STANDING):  ascorbic acid 500 milliGRAM(s) Oral daily  aspirin enteric coated 81 milliGRAM(s) Oral daily  buDESOnide 160 MICROgram(s)/formoterol 4.5 MICROgram(s) Inhaler 2 Puff(s) Inhalation two times a day  clotrimazole/betamethasone Cream 1 Application(s) Topical two times a day  docusate sodium 100 milliGRAM(s) Oral three times a day  epoetin jenni Injectable 23547 Unit(s) SubCutaneous every 7 days  ferrous    sulfate 325 milliGRAM(s) Oral daily  folic acid 1 milliGRAM(s) Oral daily  gabapentin 200 milliGRAM(s) Oral three times a day  lactulose Syrup 10 Gram(s) Oral every 6 hours  melatonin 3 milliGRAM(s) Oral at bedtime  minocycline 100 milliGRAM(s) Oral two times a day  multivitamin 1 Tablet(s) Oral daily  nystatin    Suspension 685155 Unit(s) Oral every 6 hours  pantoprazole    Tablet 40 milliGRAM(s) Oral before breakfast  polyethylene glycol 3350 17 Gram(s) Oral daily  povidone iodine 10% Solution 1 Application(s) Topical every 12 hours  tiotropium 18 MICROgram(s) Capsule 1 Capsule(s) Inhalation daily  zinc oxide 20% Ointment 1 Application(s) Topical daily    MEDICATIONS  (PRN):  acetaminophen   Tablet 650 milliGRAM(s) Oral every 6 hours PRN For Temp greater than 38 C (100.4 F)  acetaminophen   Tablet. 650 milliGRAM(s) Oral every 6 hours PRN Mild Pain (1 - 3)  bisacodyl Suppository 10 milliGRAM(s) Rectal daily PRN Constipation  HYDROmorphone   Tablet 4 milliGRAM(s) Oral every 4 hours PRN Severe Pain (7 - 10)  HYDROmorphone   Tablet 2 milliGRAM(s) Oral every 4 hours PRN Moderate Pain (4 - 6)  HYDROmorphone  Injectable 0.5 milliGRAM(s) IV Push daily PRN Dressing change  lidocaine 2% Gel 1 Application(s) Topical daily PRN dressing change  lidocaine 2% Viscous 10 milliLiter(s) Swish and Spit every 6 hours PRN Mouth Sores  senna 2 Tablet(s) Oral at bedtime PRN Constipation    ** PHYSICAL EXAM **  -- CONSTITUTIONAL: AOx3. NAD.   -- ABDOMEN: wound with NPWT, holding suction  -- EXTREMITIES: The right thigh wound has a VAC in place. Stable eschar overlying patella, painted with betadine.  Medial wound with wet to dry gauze.

## 2018-05-22 LAB
ANION GAP SERPL CALC-SCNC: 9 MMOL/L — SIGNIFICANT CHANGE UP (ref 5–17)
ANTIBODY ID 1_1: SIGNIFICANT CHANGE UP
ANTIBODY ID 1_2: SIGNIFICANT CHANGE UP
BLD GP AB SCN SERPL QL: POSITIVE — SIGNIFICANT CHANGE UP
BUN SERPL-MCNC: 16 MG/DL — SIGNIFICANT CHANGE UP (ref 7–23)
CALCIUM SERPL-MCNC: 9.3 MG/DL — SIGNIFICANT CHANGE UP (ref 8.4–10.5)
CHLORIDE SERPL-SCNC: 100 MMOL/L — SIGNIFICANT CHANGE UP (ref 96–108)
CO2 SERPL-SCNC: 31 MMOL/L — SIGNIFICANT CHANGE UP (ref 22–31)
CREAT SERPL-MCNC: 0.62 MG/DL — SIGNIFICANT CHANGE UP (ref 0.5–1.3)
DAT C3-SP REAG RBC QL: POSITIVE — SIGNIFICANT CHANGE UP
DAT POLY-SP REAG RBC QL: POSITIVE — SIGNIFICANT CHANGE UP
DIRECT COOMBS IGG: NEGATIVE — SIGNIFICANT CHANGE UP
GLUCOSE SERPL-MCNC: 118 MG/DL — HIGH (ref 70–99)
HCT VFR BLD CALC: 26.2 % — LOW (ref 34.5–45)
HGB BLD-MCNC: 7.8 G/DL — LOW (ref 11.5–15.5)
INR BLD: 1.64 RATIO — HIGH (ref 0.88–1.16)
MCHC RBC-ENTMCNC: 27.1 PG — SIGNIFICANT CHANGE UP (ref 27–34)
MCHC RBC-ENTMCNC: 29.8 GM/DL — LOW (ref 32–36)
MCV RBC AUTO: 91 FL — SIGNIFICANT CHANGE UP (ref 80–100)
PLATELET # BLD AUTO: 315 K/UL — SIGNIFICANT CHANGE UP (ref 150–400)
POTASSIUM SERPL-MCNC: 4.4 MMOL/L — SIGNIFICANT CHANGE UP (ref 3.5–5.3)
POTASSIUM SERPL-SCNC: 4.4 MMOL/L — SIGNIFICANT CHANGE UP (ref 3.5–5.3)
PROTHROM AB SERPL-ACNC: 18.7 SEC — HIGH (ref 10–13.1)
RBC # BLD: 2.88 M/UL — LOW (ref 3.8–5.2)
RBC # FLD: 18.9 % — HIGH (ref 10.3–14.5)
RH IG SCN BLD-IMP: POSITIVE — SIGNIFICANT CHANGE UP
SODIUM SERPL-SCNC: 140 MMOL/L — SIGNIFICANT CHANGE UP (ref 135–145)
WBC # BLD: 11.62 K/UL — HIGH (ref 3.8–10.5)
WBC # FLD AUTO: 11.62 K/UL — HIGH (ref 3.8–10.5)

## 2018-05-22 RX ORDER — WARFARIN SODIUM 2.5 MG/1
0.5 TABLET ORAL ONCE
Qty: 0 | Refills: 0 | Status: COMPLETED | OUTPATIENT
Start: 2018-05-22 | End: 2018-05-22

## 2018-05-22 RX ADMIN — HYDROMORPHONE HYDROCHLORIDE 4 MILLIGRAM(S): 2 INJECTION INTRAMUSCULAR; INTRAVENOUS; SUBCUTANEOUS at 10:11

## 2018-05-22 RX ADMIN — Medication 1 MILLIGRAM(S): at 13:17

## 2018-05-22 RX ADMIN — HYDROMORPHONE HYDROCHLORIDE 4 MILLIGRAM(S): 2 INJECTION INTRAMUSCULAR; INTRAVENOUS; SUBCUTANEOUS at 09:38

## 2018-05-22 RX ADMIN — HYDROMORPHONE HYDROCHLORIDE 4 MILLIGRAM(S): 2 INJECTION INTRAMUSCULAR; INTRAVENOUS; SUBCUTANEOUS at 05:27

## 2018-05-22 RX ADMIN — Medication 500000 UNIT(S): at 13:17

## 2018-05-22 RX ADMIN — BUDESONIDE AND FORMOTEROL FUMARATE DIHYDRATE 2 PUFF(S): 160; 4.5 AEROSOL RESPIRATORY (INHALATION) at 05:33

## 2018-05-22 RX ADMIN — Medication 100 MILLIGRAM(S): at 05:29

## 2018-05-22 RX ADMIN — Medication 500000 UNIT(S): at 05:33

## 2018-05-22 RX ADMIN — CLOTRIMAZOLE AND BETAMETHASONE DIPROPIONATE 1 APPLICATION(S): 10; .5 CREAM TOPICAL at 18:08

## 2018-05-22 RX ADMIN — ZINC OXIDE 1 APPLICATION(S): 200 OINTMENT TOPICAL at 13:19

## 2018-05-22 RX ADMIN — Medication 3 MILLIGRAM(S): at 21:45

## 2018-05-22 RX ADMIN — WARFARIN SODIUM 0.5 MILLIGRAM(S): 2.5 TABLET ORAL at 21:43

## 2018-05-22 RX ADMIN — HYDROMORPHONE HYDROCHLORIDE 4 MILLIGRAM(S): 2 INJECTION INTRAMUSCULAR; INTRAVENOUS; SUBCUTANEOUS at 05:57

## 2018-05-22 RX ADMIN — Medication 1 APPLICATION(S): at 17:59

## 2018-05-22 RX ADMIN — Medication 1 TABLET(S): at 13:17

## 2018-05-22 RX ADMIN — MINOCYCLINE HYDROCHLORIDE 100 MILLIGRAM(S): 45 TABLET, EXTENDED RELEASE ORAL at 05:27

## 2018-05-22 RX ADMIN — HYDROMORPHONE HYDROCHLORIDE 4 MILLIGRAM(S): 2 INJECTION INTRAMUSCULAR; INTRAVENOUS; SUBCUTANEOUS at 20:24

## 2018-05-22 RX ADMIN — HYDROMORPHONE HYDROCHLORIDE 4 MILLIGRAM(S): 2 INJECTION INTRAMUSCULAR; INTRAVENOUS; SUBCUTANEOUS at 20:54

## 2018-05-22 RX ADMIN — MINOCYCLINE HYDROCHLORIDE 100 MILLIGRAM(S): 45 TABLET, EXTENDED RELEASE ORAL at 18:01

## 2018-05-22 RX ADMIN — GABAPENTIN 200 MILLIGRAM(S): 400 CAPSULE ORAL at 13:17

## 2018-05-22 RX ADMIN — Medication 100 MILLIGRAM(S): at 13:19

## 2018-05-22 RX ADMIN — HYDROMORPHONE HYDROCHLORIDE 0.5 MILLIGRAM(S): 2 INJECTION INTRAMUSCULAR; INTRAVENOUS; SUBCUTANEOUS at 22:15

## 2018-05-22 RX ADMIN — Medication 500 MILLIGRAM(S): at 13:19

## 2018-05-22 RX ADMIN — HYDROMORPHONE HYDROCHLORIDE 4 MILLIGRAM(S): 2 INJECTION INTRAMUSCULAR; INTRAVENOUS; SUBCUTANEOUS at 16:00

## 2018-05-22 RX ADMIN — Medication 100 MILLIGRAM(S): at 21:45

## 2018-05-22 RX ADMIN — TIOTROPIUM BROMIDE 1 CAPSULE(S): 18 CAPSULE ORAL; RESPIRATORY (INHALATION) at 13:18

## 2018-05-22 RX ADMIN — BUDESONIDE AND FORMOTEROL FUMARATE DIHYDRATE 2 PUFF(S): 160; 4.5 AEROSOL RESPIRATORY (INHALATION) at 18:01

## 2018-05-22 RX ADMIN — HYDROMORPHONE HYDROCHLORIDE 0.5 MILLIGRAM(S): 2 INJECTION INTRAMUSCULAR; INTRAVENOUS; SUBCUTANEOUS at 21:45

## 2018-05-22 RX ADMIN — Medication 81 MILLIGRAM(S): at 13:17

## 2018-05-22 RX ADMIN — Medication 500000 UNIT(S): at 22:07

## 2018-05-22 RX ADMIN — GABAPENTIN 200 MILLIGRAM(S): 400 CAPSULE ORAL at 21:44

## 2018-05-22 RX ADMIN — Medication 1 APPLICATION(S): at 05:33

## 2018-05-22 RX ADMIN — Medication 500000 UNIT(S): at 18:01

## 2018-05-22 RX ADMIN — GABAPENTIN 200 MILLIGRAM(S): 400 CAPSULE ORAL at 05:29

## 2018-05-22 RX ADMIN — PANTOPRAZOLE SODIUM 40 MILLIGRAM(S): 20 TABLET, DELAYED RELEASE ORAL at 05:33

## 2018-05-22 RX ADMIN — CLOTRIMAZOLE AND BETAMETHASONE DIPROPIONATE 1 APPLICATION(S): 10; .5 CREAM TOPICAL at 05:29

## 2018-05-22 RX ADMIN — POLYETHYLENE GLYCOL 3350 17 GRAM(S): 17 POWDER, FOR SOLUTION ORAL at 13:19

## 2018-05-22 RX ADMIN — Medication 325 MILLIGRAM(S): at 13:19

## 2018-05-22 RX ADMIN — HYDROMORPHONE HYDROCHLORIDE 4 MILLIGRAM(S): 2 INJECTION INTRAMUSCULAR; INTRAVENOUS; SUBCUTANEOUS at 15:07

## 2018-05-22 NOTE — PROGRESS NOTE ADULT - SUBJECTIVE AND OBJECTIVE BOX
PLASTIC SURGERY DAILY PROGRESS NOTE:    SUBJECTIVE:  Patient seen and examined. No acute events overnight.     OBJECTIVE:   Vital Signs Last 24 Hrs  T(C): 36.4 (22 May 2018 04:44), Max: 36.6 (21 May 2018 20:39)  T(F): 97.5 (22 May 2018 04:44), Max: 97.8 (21 May 2018 20:39)  HR: 64 (22 May 2018 04:44) (64 - 73)  BP: 144/62 (22 May 2018 04:44) (134/98 - 144/62)  BP(mean): --  RR: 18 (22 May 2018 04:44) (18 - 18)  SpO2: 96% (22 May 2018 04:44) (94% - 97%)    I&O's Detail    21 May 2018 07:01  -  22 May 2018 07:00  --------------------------------------------------------  IN:    Oral Fluid: 360 mL  Total IN: 360 mL    OUT:  Total OUT: 0 mL    Total NET: 360 mL    MEDICATIONS  (STANDING):  ascorbic acid 500 milliGRAM(s) Oral daily  aspirin enteric coated 81 milliGRAM(s) Oral daily  buDESOnide 160 MICROgram(s)/formoterol 4.5 MICROgram(s) Inhaler 2 Puff(s) Inhalation two times a day  clotrimazole/betamethasone Cream 1 Application(s) Topical two times a day  docusate sodium 100 milliGRAM(s) Oral three times a day  epoetin jenni Injectable 43955 Unit(s) SubCutaneous every 7 days  ferrous    sulfate 325 milliGRAM(s) Oral daily  folic acid 1 milliGRAM(s) Oral daily  gabapentin 200 milliGRAM(s) Oral three times a day  melatonin 3 milliGRAM(s) Oral at bedtime  minocycline 100 milliGRAM(s) Oral two times a day  multivitamin 1 Tablet(s) Oral daily  nystatin    Suspension 371168 Unit(s) Oral every 6 hours  pantoprazole    Tablet 40 milliGRAM(s) Oral before breakfast  polyethylene glycol 3350 17 Gram(s) Oral daily  povidone iodine 10% Solution 1 Application(s) Topical every 12 hours  tiotropium 18 MICROgram(s) Capsule 1 Capsule(s) Inhalation daily  zinc oxide 20% Ointment 1 Application(s) Topical daily    MEDICATIONS  (PRN):  acetaminophen   Tablet 650 milliGRAM(s) Oral every 6 hours PRN For Temp greater than 38 C (100.4 F)  acetaminophen   Tablet. 650 milliGRAM(s) Oral every 6 hours PRN Mild Pain (1 - 3)  bisacodyl Suppository 10 milliGRAM(s) Rectal daily PRN Constipation  HYDROmorphone   Tablet 4 milliGRAM(s) Oral every 4 hours PRN Severe Pain (7 - 10)  HYDROmorphone   Tablet 2 milliGRAM(s) Oral every 4 hours PRN Moderate Pain (4 - 6)  HYDROmorphone  Injectable 0.5 milliGRAM(s) IV Push daily PRN Dressing change  lidocaine 2% Gel 1 Application(s) Topical daily PRN dressing change  lidocaine 2% Viscous 10 milliLiter(s) Swish and Spit every 6 hours PRN Mouth Sores  senna 2 Tablet(s) Oral at bedtime PRN Constipation    ** PHYSICAL EXAM **  -- CONSTITUTIONAL: AOx3. NAD.   -- ABDOMEN: wound with NPWT, holding suction  -- EXTREMITIES: The right thigh wound has a VAC in place. Stable eschar overlying patella, painted with betadine.  Medial wound with wet to dry gauze.

## 2018-05-22 NOTE — PROGRESS NOTE ADULT - ATTENDING COMMENTS
Dr. Murphy will be cover starting 5/23/18. Please call [phone] with questions. Dr. Murphy will be covering starting 5/23/18. Please call [phone] with questions.

## 2018-05-22 NOTE — PROGRESS NOTE ADULT - ASSESSMENT
no plan for orthopedic surgical intervention per family and patient preference  local wound care to knee and vac therapy to abd/thigh per plastics and wound care teams  plastics team considering abdomen wound I&D  PO abx per ID team  encourage patient to spend majority of bed oob in bedside chair as able  PT to help mobilize, she must remain 50% wb on the RLE and NO KNEE MOTION allowed, knee immobilizer if oob  coumadin, inr goal 2-3  continue to optimize nutrition  continue to involve psych	    I discussed case with her  as well as patient. 	    Nate Bass MD

## 2018-05-22 NOTE — PROGRESS NOTE ADULT - ASSESSMENT
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     # Recent septic Rt TKR - abx spacer exchanged and plastics complex wound closure  # PMR on chronic steroids  # right knee eschar, right thigh necrosis and abd wound   # Anemia chronic disease    Plan:  wound care and wound vac per plastics and ortho  s/p multiple debridements  remains afebrile, cont suppressive minocycline, appreciate ID f/u  appreciate heme recs, started on procrit for anemia of chronic disease  cont coumadin and asa  dw plastics and considering OR debridement with anesthesia for abdominal wound - pre-op eval patient is without active cardiac conditions but has significant RF including heart disease, AS sp TAVR, <1 METS, at least moderate to high risk for intermediate risk procedure  appreciate pain management recs  PT OOB no knee motion, immobilizer if out of bed  bowel regimen    plan of care dw patient and NP

## 2018-05-22 NOTE — PROGRESS NOTE ADULT - ASSESSMENT
A: 77F with complex PMH/PSH who recently had placement of a right knee antibiotic spacer followed by PRS closure c/b skin necrosis and formation of an eschar over the anterior knee joint. The patient was readmitted with fevers and right thigh cellulitis which was complicated by skin breakdown. Patient also developed wound at the right/mid lower quadrant of the abdomen.    P:  >> Dressing change TID to knee  >> Betadine to eschar bid  >> VACs on abdomen and R thigh to be changed tomorrow  >> Medical optimization as per primary team.  >> DVT prophylaxis.  >> Protein supplementation of diet to encourage wound healing.   >> Med/Pulm and Cards clearance requested    [facility] Plastic Surgery Pager -- [phone]  [facility] Plastic Surgery Pager -- [phone]

## 2018-05-22 NOTE — PROGRESS NOTE ADULT - SUBJECTIVE AND OBJECTIVE BOX
Patient is a 77y old  Female who presents with a chief complaint of fever (11 Apr 2018 14:25)      SUBJECTIVE / OVERNIGHT EVENTS:    Patient seen and examined. feels good. did work with PT today.       Vital Signs Last 24 Hrs  T(C): 36.4 (22 May 2018 04:44), Max: 36.6 (21 May 2018 20:39)  T(F): 97.5 (22 May 2018 04:44), Max: 97.8 (21 May 2018 20:39)  HR: 64 (22 May 2018 04:44) (64 - 73)  BP: 144/62 (22 May 2018 04:44) (142/75 - 144/62)  BP(mean): --  RR: 18 (22 May 2018 04:44) (18 - 18)  SpO2: 96% (22 May 2018 04:44) (94% - 96%)  I&O's Summary    21 May 2018 07:01  -  22 May 2018 07:00  --------------------------------------------------------  IN: 360 mL / OUT: 0 mL / NET: 360 mL    22 May 2018 07:01  -  22 May 2018 11:47  --------------------------------------------------------  IN: 120 mL / OUT: 0 mL / NET: 120 mL        PE:  GENERAL: NAD, AAOx3, obese  HEAD:  Atraumatic, Normocephalic  EYES: EOMI, PERRLA, conjunctiva and sclera clear  NECK: Supple, No JVD  CHEST/LUNG: CTABL, No wheeze  HEART: Regular rate and rhythm; no murmur  ABDOMEN: Soft, Nontender, Nondistended; abd wound with vac  EXTREMITIES:  2+ Peripheral Pulses, right upper thigh with vac, right knee dressed  SKIN: No rashes or lesions  NEURO: No focal deficits    LABS:                        7.5    8.28  )-----------( 287      ( 21 May 2018 07:53 )             24.9     05-22    140  |  100  |  16  ----------------------------<  118<H>  4.4   |  31  |  0.62    Ca    9.3      22 May 2018 07:31      PT/INR - ( 22 May 2018 09:15 )   PT: 18.7 sec;   INR: 1.64 ratio         PTT - ( 21 May 2018 09:48 )  PTT:41.8 sec  CAPILLARY BLOOD GLUCOSE                RADIOLOGY & ADDITIONAL TESTS:    Imaging Personally Reviewed:  [x] YES  [ ] NO    Consultant(s) Notes Reviewed:  [x] YES  [ ] NO    MEDICATIONS  (STANDING):  ascorbic acid 500 milliGRAM(s) Oral daily  aspirin enteric coated 81 milliGRAM(s) Oral daily  buDESOnide 160 MICROgram(s)/formoterol 4.5 MICROgram(s) Inhaler 2 Puff(s) Inhalation two times a day  clotrimazole/betamethasone Cream 1 Application(s) Topical two times a day  docusate sodium 100 milliGRAM(s) Oral three times a day  epoetin jenni Injectable 24844 Unit(s) SubCutaneous every 7 days  ferrous    sulfate 325 milliGRAM(s) Oral daily  folic acid 1 milliGRAM(s) Oral daily  gabapentin 200 milliGRAM(s) Oral three times a day  melatonin 3 milliGRAM(s) Oral at bedtime  minocycline 100 milliGRAM(s) Oral two times a day  multivitamin 1 Tablet(s) Oral daily  nystatin    Suspension 439375 Unit(s) Oral every 6 hours  pantoprazole    Tablet 40 milliGRAM(s) Oral before breakfast  polyethylene glycol 3350 17 Gram(s) Oral daily  povidone iodine 10% Solution 1 Application(s) Topical every 12 hours  tiotropium 18 MICROgram(s) Capsule 1 Capsule(s) Inhalation daily  zinc oxide 20% Ointment 1 Application(s) Topical daily    MEDICATIONS  (PRN):  acetaminophen   Tablet 650 milliGRAM(s) Oral every 6 hours PRN For Temp greater than 38 C (100.4 F)  acetaminophen   Tablet. 650 milliGRAM(s) Oral every 6 hours PRN Mild Pain (1 - 3)  bisacodyl Suppository 10 milliGRAM(s) Rectal daily PRN Constipation  HYDROmorphone   Tablet 4 milliGRAM(s) Oral every 4 hours PRN Severe Pain (7 - 10)  HYDROmorphone   Tablet 2 milliGRAM(s) Oral every 4 hours PRN Moderate Pain (4 - 6)  HYDROmorphone  Injectable 0.5 milliGRAM(s) IV Push daily PRN Dressing change  lidocaine 2% Gel 1 Application(s) Topical daily PRN dressing change  lidocaine 2% Viscous 10 milliLiter(s) Swish and Spit every 6 hours PRN Mouth Sores  senna 2 Tablet(s) Oral at bedtime PRN Constipation      Care Discussed with Consultants/Other Providers [x] YES  [ ] NO    HEALTH ISSUES - PROBLEM Dx:  Pain: Pain  Adjustment disorder, unspecified type  Delirium due to another medical condition  Pneumonia: Pneumonia  Aortic stenosis, severe: Aortic stenosis, severe  MYAH (obstructive sleep apnea): MYAH (obstructive sleep apnea)  Obesity: Obesity  Asthma: Asthma  SOB (shortness of breath): SOB (shortness of breath)  Chronic deep vein thrombosis (DVT) of lower extremity, unspecified laterality, unspecified vein: Chronic deep vein thrombosis (DVT) of lower extremity, unspecified laterality, unspecified vein  Arthralgia of knee, unspecified laterality: Arthralgia of knee, unspecified laterality  Hyperlipidemia, unspecified hyperlipidemia type: Hyperlipidemia, unspecified hyperlipidemia type  Asthma, unspecified asthma severity, unspecified whether complicated, unspecified whether persistent: Asthma, unspecified asthma severity, unspecified whether complicated, unspecified whether persistent  Coronary artery disease involving native coronary artery of native heart without angina pectoris: Coronary artery disease involving native coronary artery of native heart without angina pectoris  Gastroesophageal reflux disease, esophagitis presence not specified: Gastroesophageal reflux disease, esophagitis presence not specified  Fever, unspecified fever cause: Fever, unspecified fever cause

## 2018-05-23 ENCOUNTER — RESULT REVIEW (OUTPATIENT)
Age: 78
End: 2018-05-23

## 2018-05-23 DIAGNOSIS — L98.499 NON-PRESSURE CHRONIC ULCER OF SKIN OF OTHER SITES WITH UNSPECIFIED SEVERITY: ICD-10-CM

## 2018-05-23 LAB
GRAM STN FLD: SIGNIFICANT CHANGE UP
HCT VFR BLD CALC: 26.5 % — LOW (ref 34.5–45)
HGB BLD-MCNC: 8 G/DL — LOW (ref 11.5–15.5)
INR BLD: 1.54 RATIO — HIGH (ref 0.88–1.16)
MCHC RBC-ENTMCNC: 27.4 PG — SIGNIFICANT CHANGE UP (ref 27–34)
MCHC RBC-ENTMCNC: 30.2 GM/DL — LOW (ref 32–36)
MCV RBC AUTO: 90.8 FL — SIGNIFICANT CHANGE UP (ref 80–100)
PLATELET # BLD AUTO: 304 K/UL — SIGNIFICANT CHANGE UP (ref 150–400)
PROTHROM AB SERPL-ACNC: 17.5 SEC — HIGH (ref 10–13.1)
RBC # BLD: 2.92 M/UL — LOW (ref 3.8–5.2)
RBC # FLD: 19 % — HIGH (ref 10.3–14.5)
SPECIMEN SOURCE: SIGNIFICANT CHANGE UP
WBC # BLD: 11.33 K/UL — HIGH (ref 3.8–10.5)
WBC # FLD AUTO: 11.33 K/UL — HIGH (ref 3.8–10.5)

## 2018-05-23 PROCEDURE — 11100 BX SKIN SUBCUTANEOUS&/MUCOUS MEMBRANE 1 LESION: CPT

## 2018-05-23 PROCEDURE — 99223 1ST HOSP IP/OBS HIGH 75: CPT | Mod: 25

## 2018-05-23 RX ORDER — MUPIROCIN 20 MG/G
1 OINTMENT TOPICAL
Qty: 0 | Refills: 0 | Status: DISCONTINUED | OUTPATIENT
Start: 2018-05-23 | End: 2018-06-06

## 2018-05-23 RX ORDER — HYDROMORPHONE HYDROCHLORIDE 2 MG/ML
0.5 INJECTION INTRAMUSCULAR; INTRAVENOUS; SUBCUTANEOUS DAILY
Qty: 0 | Refills: 0 | Status: DISCONTINUED | OUTPATIENT
Start: 2018-05-23 | End: 2018-05-29

## 2018-05-23 RX ORDER — HYDROMORPHONE HYDROCHLORIDE 2 MG/ML
4 INJECTION INTRAMUSCULAR; INTRAVENOUS; SUBCUTANEOUS EVERY 4 HOURS
Qty: 0 | Refills: 0 | Status: DISCONTINUED | OUTPATIENT
Start: 2018-05-23 | End: 2018-05-29

## 2018-05-23 RX ORDER — HYDROMORPHONE HYDROCHLORIDE 2 MG/ML
2 INJECTION INTRAMUSCULAR; INTRAVENOUS; SUBCUTANEOUS EVERY 4 HOURS
Qty: 0 | Refills: 0 | Status: DISCONTINUED | OUTPATIENT
Start: 2018-05-23 | End: 2018-05-29

## 2018-05-23 RX ORDER — WARFARIN SODIUM 2.5 MG/1
1 TABLET ORAL ONCE
Qty: 0 | Refills: 0 | Status: COMPLETED | OUTPATIENT
Start: 2018-05-23 | End: 2018-05-23

## 2018-05-23 RX ADMIN — BUDESONIDE AND FORMOTEROL FUMARATE DIHYDRATE 2 PUFF(S): 160; 4.5 AEROSOL RESPIRATORY (INHALATION) at 06:42

## 2018-05-23 RX ADMIN — Medication 100 MILLIGRAM(S): at 13:16

## 2018-05-23 RX ADMIN — Medication 1 APPLICATION(S): at 18:41

## 2018-05-23 RX ADMIN — POLYETHYLENE GLYCOL 3350 17 GRAM(S): 17 POWDER, FOR SOLUTION ORAL at 13:15

## 2018-05-23 RX ADMIN — ZINC OXIDE 1 APPLICATION(S): 200 OINTMENT TOPICAL at 13:16

## 2018-05-23 RX ADMIN — HYDROMORPHONE HYDROCHLORIDE 4 MILLIGRAM(S): 2 INJECTION INTRAMUSCULAR; INTRAVENOUS; SUBCUTANEOUS at 22:07

## 2018-05-23 RX ADMIN — HYDROMORPHONE HYDROCHLORIDE 0.5 MILLIGRAM(S): 2 INJECTION INTRAMUSCULAR; INTRAVENOUS; SUBCUTANEOUS at 06:42

## 2018-05-23 RX ADMIN — Medication 500000 UNIT(S): at 06:44

## 2018-05-23 RX ADMIN — HYDROMORPHONE HYDROCHLORIDE 0.5 MILLIGRAM(S): 2 INJECTION INTRAMUSCULAR; INTRAVENOUS; SUBCUTANEOUS at 09:16

## 2018-05-23 RX ADMIN — Medication 1 APPLICATION(S): at 06:41

## 2018-05-23 RX ADMIN — Medication 500 MILLIGRAM(S): at 13:15

## 2018-05-23 RX ADMIN — MINOCYCLINE HYDROCHLORIDE 100 MILLIGRAM(S): 45 TABLET, EXTENDED RELEASE ORAL at 06:43

## 2018-05-23 RX ADMIN — CLOTRIMAZOLE AND BETAMETHASONE DIPROPIONATE 1 APPLICATION(S): 10; .5 CREAM TOPICAL at 06:45

## 2018-05-23 RX ADMIN — Medication 3 MILLIGRAM(S): at 22:14

## 2018-05-23 RX ADMIN — Medication 1 TABLET(S): at 13:16

## 2018-05-23 RX ADMIN — Medication 81 MILLIGRAM(S): at 13:16

## 2018-05-23 RX ADMIN — HYDROMORPHONE HYDROCHLORIDE 4 MILLIGRAM(S): 2 INJECTION INTRAMUSCULAR; INTRAVENOUS; SUBCUTANEOUS at 11:00

## 2018-05-23 RX ADMIN — Medication 325 MILLIGRAM(S): at 13:16

## 2018-05-23 RX ADMIN — BUDESONIDE AND FORMOTEROL FUMARATE DIHYDRATE 2 PUFF(S): 160; 4.5 AEROSOL RESPIRATORY (INHALATION) at 22:19

## 2018-05-23 RX ADMIN — GABAPENTIN 200 MILLIGRAM(S): 400 CAPSULE ORAL at 22:14

## 2018-05-23 RX ADMIN — Medication 1 MILLIGRAM(S): at 13:15

## 2018-05-23 RX ADMIN — HYDROMORPHONE HYDROCHLORIDE 4 MILLIGRAM(S): 2 INJECTION INTRAMUSCULAR; INTRAVENOUS; SUBCUTANEOUS at 22:40

## 2018-05-23 RX ADMIN — WARFARIN SODIUM 1 MILLIGRAM(S): 2.5 TABLET ORAL at 22:19

## 2018-05-23 RX ADMIN — MINOCYCLINE HYDROCHLORIDE 100 MILLIGRAM(S): 45 TABLET, EXTENDED RELEASE ORAL at 22:14

## 2018-05-23 RX ADMIN — Medication 100 MILLIGRAM(S): at 06:44

## 2018-05-23 RX ADMIN — Medication 500000 UNIT(S): at 22:14

## 2018-05-23 RX ADMIN — CLOTRIMAZOLE AND BETAMETHASONE DIPROPIONATE 1 APPLICATION(S): 10; .5 CREAM TOPICAL at 18:40

## 2018-05-23 RX ADMIN — Medication 500000 UNIT(S): at 13:16

## 2018-05-23 RX ADMIN — GABAPENTIN 200 MILLIGRAM(S): 400 CAPSULE ORAL at 13:15

## 2018-05-23 RX ADMIN — TIOTROPIUM BROMIDE 1 CAPSULE(S): 18 CAPSULE ORAL; RESPIRATORY (INHALATION) at 13:16

## 2018-05-23 RX ADMIN — GABAPENTIN 200 MILLIGRAM(S): 400 CAPSULE ORAL at 06:44

## 2018-05-23 RX ADMIN — HYDROMORPHONE HYDROCHLORIDE 0.5 MILLIGRAM(S): 2 INJECTION INTRAMUSCULAR; INTRAVENOUS; SUBCUTANEOUS at 09:58

## 2018-05-23 RX ADMIN — PANTOPRAZOLE SODIUM 40 MILLIGRAM(S): 20 TABLET, DELAYED RELEASE ORAL at 06:44

## 2018-05-23 RX ADMIN — Medication 100 MILLIGRAM(S): at 22:15

## 2018-05-23 RX ADMIN — HYDROMORPHONE HYDROCHLORIDE 4 MILLIGRAM(S): 2 INJECTION INTRAMUSCULAR; INTRAVENOUS; SUBCUTANEOUS at 09:59

## 2018-05-23 RX ADMIN — HYDROMORPHONE HYDROCHLORIDE 0.5 MILLIGRAM(S): 2 INJECTION INTRAMUSCULAR; INTRAVENOUS; SUBCUTANEOUS at 07:26

## 2018-05-23 NOTE — PROGRESS NOTE ADULT - SUBJECTIVE AND OBJECTIVE BOX
SUBJECTIVE: Pt seen, chart reviewed.    re evaluation requested for new wounds  Patient was examined on 5/22/18  Has developed superficial small wounds found in medial and lateral left thigh  offers no new complaints    Allergies    amoxicillin (Other)    Intolerances    IV Contrast (Flushing (Skin); Nausea)      aspirin enteric coated 81 milliGRAM(s) Oral daily  minocycline 100 milliGRAM(s) Oral two times a day  nystatin    Suspension 574878 Unit(s) Oral every 6 hours  warfarin 1 milliGRAM(s) Oral once      	    Physical Exam:  Vital Signs Last 24 Hrs  T(C): 36.5 (23 May 2018 14:21), Max: 36.5 (22 May 2018 19:31)  T(F): 97.7 (23 May 2018 14:21), Max: 97.7 (22 May 2018 19:31)  HR: 87 (23 May 2018 14:21) (67 - 87)  BP: 127/74 (23 May 2018 14:21) (119/67 - 141/81)  BP(mean): --  RR: 18 (23 May 2018 14:21) (18 - 20)  SpO2: 92% (23 May 2018 14:21) (92% - 97%)    remains confined to bed, using O2  Not SOB  Remains significantly overweight with generalized atrophic skin  Several superficial , small skin wounds are found in area of medial left thigh, while on the lateral thigh , a small wound probes to 4 cm , and resembles abdominal wound when first noted. Aquacel packing was placed. Additional small , superficial skin wound also present with nio depth.  Etiology of these wounds remains obscure, but may be related to underlying PMR  Agree with Dermatology consult  Returned 's call, [phone], & message left        LABS:                        8.0    11.33 )-----------( 304      ( 23 May 2018 08:19 )             26.5     PT/INR - ( 23 May 2018 09:47 )   PT: 17.5 sec;   INR: 1.54 ratio               F[facility] [address] [place] [address] [phone]

## 2018-05-23 NOTE — CONSULT NOTE ADULT - ASSESSMENT
76 yo F with isolated skin ulcerations c/f infectious process vs pyoderma gangrenosum.  The latter is a dx of exclusion and infectious causes must be ruled out first.

## 2018-05-23 NOTE — CHART NOTE - NSCHARTNOTEFT_GEN_A_CORE
follow up- discussed with patient pain medication/effect and side effects; risk of skin impairement secondary to medical condition, impaired mobilty reviwed with pt and she understands.  Joya Wallace(NP)  3 [facility], [phone]

## 2018-05-23 NOTE — CHART NOTE - NSCHARTNOTEFT_GEN_A_CORE
New lt thigh wounds-unclear why new wounds forming in non pressure areas- Derm consult called as per d/w attending MD; d/w pt/ family;  Joya Wallace(NP)  3 [facility], [phone]

## 2018-05-23 NOTE — PROGRESS NOTE ADULT - ASSESSMENT
A: 77F with complex PMH/PSH who recently had placement of a right knee antibiotic spacer followed by PRS closure c/b skin necrosis and formation of an eschar over the anterior knee joint. The patient was readmitted with fevers and right thigh cellulitis which was complicated by skin breakdown. Patient also developed wound at the right/mid lower quadrant of the abdomen.    P:  >> Dressing change TID to knee  >> Betadine to eschar bid  >> VACs on abdomen and R thigh to be changed today  >> Medical optimization as per primary team.  >> DVT prophylaxis.  >> Protein supplementation of diet to encourage wound healing.   >> Recommend Rheumatology consult for skin lesions. Recommend Dermatology consult for extensive skin lesions.    [facility] Plastic Surgery Pager -- [phone]  [facility] Plastic Surgery Pager -- [phone]

## 2018-05-23 NOTE — PROGRESS NOTE ADULT - SUBJECTIVE AND OBJECTIVE BOX
Patient is a 77y old  Female who presents with a chief complaint of fever (11 Apr 2018 14:25)      INTERVAL HPI/OVERNIGHT EVENTS: noted, new Lt deep thigh wounds noted      Vital Signs Last 24 Hrs  T(C): 36.4 (23 May 2018 06:58), Max: 36.5 (22 May 2018 19:31)  T(F): 97.5 (23 May 2018 06:58), Max: 97.7 (22 May 2018 19:31)  HR: 67 (23 May 2018 06:58) (67 - 75)  BP: 141/81 (23 May 2018 06:58) (119/67 - 141/81)  BP(mean): --  RR: 20 (23 May 2018 06:58) (18 - 20)  SpO2: 97% (22 May 2018 19:31) (97% - 97%)    acetaminophen   Tablet 650 milliGRAM(s) Oral every 6 hours PRN  acetaminophen   Tablet. 650 milliGRAM(s) Oral every 6 hours PRN  ascorbic acid 500 milliGRAM(s) Oral daily  aspirin enteric coated 81 milliGRAM(s) Oral daily  bisacodyl Suppository 10 milliGRAM(s) Rectal daily PRN  buDESOnide 160 MICROgram(s)/formoterol 4.5 MICROgram(s) Inhaler 2 Puff(s) Inhalation two times a day  clotrimazole/betamethasone Cream 1 Application(s) Topical two times a day  docusate sodium 100 milliGRAM(s) Oral three times a day  epoetin jenni Injectable 81289 Unit(s) SubCutaneous every 7 days  ferrous    sulfate 325 milliGRAM(s) Oral daily  folic acid 1 milliGRAM(s) Oral daily  gabapentin 200 milliGRAM(s) Oral three times a day  HYDROmorphone   Tablet 4 milliGRAM(s) Oral every 4 hours PRN  HYDROmorphone   Tablet 2 milliGRAM(s) Oral every 4 hours PRN  HYDROmorphone  Injectable 0.5 milliGRAM(s) IV Push daily PRN  lidocaine 2% Gel 1 Application(s) Topical daily PRN  lidocaine 2% Viscous 10 milliLiter(s) Swish and Spit every 6 hours PRN  melatonin 3 milliGRAM(s) Oral at bedtime  minocycline 100 milliGRAM(s) Oral two times a day  multivitamin 1 Tablet(s) Oral daily  nystatin    Suspension 657592 Unit(s) Oral every 6 hours  pantoprazole    Tablet 40 milliGRAM(s) Oral before breakfast  polyethylene glycol 3350 17 Gram(s) Oral daily  povidone iodine 10% Solution 1 Application(s) Topical every 12 hours  senna 2 Tablet(s) Oral at bedtime PRN  tiotropium 18 MICROgram(s) Capsule 1 Capsule(s) Inhalation daily  warfarin 1 milliGRAM(s) Oral once  zinc oxide 20% Ointment 1 Application(s) Topical daily      PHYSICAL EXAM:  GENERAL: NAD,   EYES: conjunctiva and sclera clear  ENMT: Moist mucous membranes  NECK: Supple, No JVD, Normal thyroid  NERVOUS SYSTEM:  Alert & Oriented X3,   CHEST/LUNG: Clear to auscultation bilaterally; No rales, rhonchi, wheezing, or rubs  HEART: Regular rate and rhythm; No murmurs, rubs, or gallops  ABDOMEN: Soft, Nontender, Nondistended; Bowel sounds present, abd wound vac  EXTREMITIES: medial thigh wound vac  lt thigh deep wound packed and dressing done  LYMPH: No lymphadenopathy noted      Consultant(s) Notes Reviewed:  [x ] YES  [ ] NO  Care Discussed with Consultants/Other Providers [ x] YES  [ ] NO    LABS:                        8.0    11.33 )-----------( 304      ( 23 May 2018 08:19 )             26.5     05-22    140  |  100  |  16  ----------------------------<  118<H>  4.4   |  31  |  0.62    Ca    9.3      22 May 2018 07:31      PT/INR - ( 23 May 2018 09:47 )   PT: 17.5 sec;   INR: 1.54 ratio             CAPILLARY BLOOD GLUCOSE                RADIOLOGY & ADDITIONAL TESTS:    Imaging Personally Reviewed:  [ ] YES  [ ] NO

## 2018-05-23 NOTE — PROGRESS NOTE ADULT - ASSESSMENT
no plan for orthopedic surgical intervention per family and patient preference  local wound care to knee and vac therapy to abd/thigh per plastics and wound care teams  plastics team considering abdomen wound I&D but patient considered high risk for surgery so plan for nonop at moment per primary team  new contralat thigh lesions forming being managed by derm, rheum, wound care, plastics  PO abx per ID team  encourage patient to spend majority of bed oob in bedside chair as able  PT to help mobilize, she must remain 50% wb on the RLE and NO KNEE MOTION allowed, knee immobilizer if oob  coumadin, inr goal 2-3, currently sub-therapeutic  continue to optimize nutrition  continue to involve psych	      Nate Bass MD

## 2018-05-23 NOTE — CHART NOTE - NSCHARTNOTEFT_GEN_A_CORE
Nutrition follow up. patient eating well, accepts james 2x/day, nepro 2x/day to meet increased nutrient needs and promote wound healing.   Chart reviewed events noted.  # Recent septic Rt TKR - abx spacer exchanged and plastics complex wound closure  # PMR on chronic steroids  # right knee eschar, right thigh necrosis and abd wound   #Multiple deep skin wounds  # Anemia chronic disease  wound care and wound vac per plastics and ortho  s/p multiple debridements  New lt thigh wounds-unclear why new wounds forming   Source: Patient [XX ]    Family [ ]     other [ X  personal HHA    Diet : Low sodium soft    Patient reports   [X ] other: no complaints     PO intake:  < 50% [ ] 50-75% [X ]   % [ ]  other :     Source for PO intake [X ] Patient [ ] family [ ] chart [ X] staff [ ] other     Enteral /Parenteral Nutrition: NA    Current Weight: 110kg  Dosing weight 117kg    Pertinent Medications: MEDICATIONS  (STANDING):  ascorbic acid 500 milliGRAM(s) Oral daily  aspirin enteric coated 81 milliGRAM(s) Oral daily  buDESOnide 160 MICROgram(s)/formoterol 4.5 MICROgram(s) Inhaler 2 Puff(s) Inhalation two times a day  clotrimazole/betamethasone Cream 1 Application(s) Topical two times a day  docusate sodium 100 milliGRAM(s) Oral three times a day  epoetin jenni Injectable 58200 Unit(s) SubCutaneous every 7 days  ferrous    sulfate 325 milliGRAM(s) Oral daily  folic acid 1 milliGRAM(s) Oral daily  gabapentin 200 milliGRAM(s) Oral three times a day  melatonin 3 milliGRAM(s) Oral at bedtime  minocycline 100 milliGRAM(s) Oral two times a day  multivitamin 1 Tablet(s) Oral daily  nystatin    Suspension 792547 Unit(s) Oral every 6 hours  pantoprazole    Tablet 40 milliGRAM(s) Oral before breakfast  polyethylene glycol 3350 17 Gram(s) Oral daily  povidone iodine 10% Solution 1 Application(s) Topical every 12 hours  tiotropium 18 MICROgram(s) Capsule 1 Capsule(s) Inhalation daily  warfarin 1 milliGRAM(s) Oral once  zinc oxide 20% Ointment 1 Application(s) Topical daily    MEDICATIONS  (PRN):  acetaminophen   Tablet 650 milliGRAM(s) Oral every 6 hours PRN For Temp greater than 38 C (100.4 F)  acetaminophen   Tablet. 650 milliGRAM(s) Oral every 6 hours PRN Mild Pain (1 - 3)  bisacodyl Suppository 10 milliGRAM(s) Rectal daily PRN Constipation  HYDROmorphone   Tablet 4 milliGRAM(s) Oral every 4 hours PRN Severe Pain (7 - 10)  HYDROmorphone   Tablet 2 milliGRAM(s) Oral every 4 hours PRN Moderate Pain (4 - 6)  HYDROmorphone  Injectable 0.5 milliGRAM(s) IV Push daily PRN Dressing change  lidocaine 2% Gel 1 Application(s) Topical daily PRN dressing change  lidocaine 2% Viscous 10 milliLiter(s) Swish and Spit every 6 hours PRN Mouth Sores  senna 2 Tablet(s) Oral at bedtime PRN Constipation    Pertinent Labs:  05-22 Na140 mmol/L Glu 118 mg/dL<H> K+ 4.4 mmol/L Cr  0.62 mg/dL BUN 16 mg/dL      Skin: see above, wound vac in p;lace    Estimated Needs:   [ ] no change since previous assessment  [ ] recalculated:       Previous Nutrition Diagnosis:        [ ] X ] Increased Nutrient Needs           Nutrition Diagnosis is [X ] ongoing          New Nutrition Diagnosis: [ X] not applicable      Recommend    [ ] Change Diet To:    [X ] Nutrition Supplement  continue james x2, nepro x2    [ ] Nutrition Support    [ ] Other:        Monitoring and Evaluation:     [X ] PO intake [ X] Tolerance to diet prescription [X ] weights [X ] follow up per protocol    [ ] other:

## 2018-05-23 NOTE — CONSULT NOTE ADULT - SUBJECTIVE AND OBJECTIVE BOX
HPI:  Review of history and patient recent course:  76yo f PMH including HLD, GERD, Anxiety, Anemia, CAD s/p HERBERT, severe AS (s/p TAVR & PPM 12/17 [employer] Model [id number]), h/o MVR, PMR on chronic steroids, asthma, OA, s/p TKR with recent joint infection s/p explant and abx spacer on 12/23/17, + rehab when had RLE DVT (dvt proph was held 2nd nose bleed)  on Coumadins/p  3/23/2018 Right knee explantation of previously placed articulating antibiotic spacer, irrigation and debridement of the knee, placement of static antibiotic spacer, with a Plastic Surgery soft tissue complex wound closure.     Pt's hospital course c/b skin ulcerations on b/l thighs and abdomen. Debridement has not helped. Pt says lesions are painful, sometimes oozing.      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  Artificial cardiac pacemaker: 12/25/17  H/O cardiac catheterization: 11/29/16 HERBERT placed on LAD  H/O endoscopy: with dilatation of esophageal stricture 2013  S/P cataract surgery: x2; 3-4yr ago  S/P gastroplasty: 28 yrs ago  S/P cholecystectomy: more than 15 years ago  S/P total knee replacement: left, 15yr ago  S/P total knee replacement: right, 12yr ago  S/P hysterectomy: 24yr ago      REVIEW OF SYSTEMS      General: no fevers/chills, no lethary	    Skin/Breast: see HPI  	  Ophthalmologic: no eye pain or change in vision  	  ENMT: no dysphagia or change in hearing    Respiratory and Thorax: no SOB or cough  	  Cardiovascular: no palpitations or chest pain    Gastrointestinal: no abdomenal pain or blood in stool     Genitourinary: no dysuria or frequency    Musculoskeletal: no joint pains or weakness	    Neurological:no weakness, numbness , or tingling    MEDICATIONS  (STANDING):  ascorbic acid 500 milliGRAM(s) Oral daily  aspirin enteric coated 81 milliGRAM(s) Oral daily  buDESOnide 160 MICROgram(s)/formoterol 4.5 MICROgram(s) Inhaler 2 Puff(s) Inhalation two times a day  clotrimazole/betamethasone Cream 1 Application(s) Topical two times a day  docusate sodium 100 milliGRAM(s) Oral three times a day  epoetin jenni Injectable 76857 Unit(s) SubCutaneous every 7 days  ferrous    sulfate 325 milliGRAM(s) Oral daily  folic acid 1 milliGRAM(s) Oral daily  gabapentin 200 milliGRAM(s) Oral three times a day  melatonin 3 milliGRAM(s) Oral at bedtime  minocycline 100 milliGRAM(s) Oral two times a day  multivitamin 1 Tablet(s) Oral daily  nystatin    Suspension 602367 Unit(s) Oral every 6 hours  pantoprazole    Tablet 40 milliGRAM(s) Oral before breakfast  polyethylene glycol 3350 17 Gram(s) Oral daily  povidone iodine 10% Solution 1 Application(s) Topical every 12 hours  tiotropium 18 MICROgram(s) Capsule 1 Capsule(s) Inhalation daily  warfarin 1 milliGRAM(s) Oral once  zinc oxide 20% Ointment 1 Application(s) Topical daily    MEDICATIONS  (PRN):  acetaminophen   Tablet 650 milliGRAM(s) Oral every 6 hours PRN For Temp greater than 38 C (100.4 F)  acetaminophen   Tablet. 650 milliGRAM(s) Oral every 6 hours PRN Mild Pain (1 - 3)  bisacodyl Suppository 10 milliGRAM(s) Rectal daily PRN Constipation  HYDROmorphone   Tablet 4 milliGRAM(s) Oral every 4 hours PRN Severe Pain (7 - 10)  HYDROmorphone   Tablet 2 milliGRAM(s) Oral every 4 hours PRN Moderate Pain (4 - 6)  HYDROmorphone  Injectable 0.5 milliGRAM(s) IV Push daily PRN Dressing change  lidocaine 2% Gel 1 Application(s) Topical daily PRN dressing change  lidocaine 2% Viscous 10 milliLiter(s) Swish and Spit every 6 hours PRN Mouth Sores  senna 2 Tablet(s) Oral at bedtime PRN Constipation      Allergies    amoxicillin (Other)    Intolerances    IV Contrast (Flushing (Skin); Nausea)      SOCIAL HISTORY:    FAMILY HISTORY:  No pertinent family history in first degree relatives      Vital Signs Last 24 Hrs  T(C): 36.5 (23 May 2018 14:21), Max: 36.5 (22 May 2018 19:31)  T(F): 97.7 (23 May 2018 14:21), Max: 97.7 (22 May 2018 19:31)  HR: 87 (23 May 2018 14:21) (67 - 87)  BP: 127/74 (23 May 2018 14:21) (119/67 - 141/81)  BP(mean): --  RR: 18 (23 May 2018 14:21) (18 - 20)  SpO2: 92% (23 May 2018 14:21) (92% - 97%)    PHYSICAL EXAM:     The patient was alert and oriented X 3, well nourished, and in no  apparent distress.  OP showed no ulcerations  There was no visible lymphadenopathy.  Conjunctiva were non injected  There was no clubbing or edema of extremities.  The scalp, hair, face, eyebrows, lips, OP, neck, chest, back,   extremities X 4, nails were examined.  There was no hyperhidrosis or bromhidrosis.    Of note on skin exam:     scattered ulcerations, some with undermined borders and drainage       LABS:                        8.0    11.33 )-----------( 304      ( 23 May 2018 08:19 )             26.5     05-22    140  |  100  |  16  ----------------------------<  118<H>  4.4   |  31  |  0.62    Ca    9.3      22 May 2018 07:31      PT/INR - ( 23 May 2018 09:47 )   PT: 17.5 sec;   INR: 1.54 ratio               RADIOLOGY & ADDITIONAL STUDIES: HPI:  Review of history and patient recent course:  78yo f PMH including HLD, GERD, Anxiety, Anemia, CAD s/p HERBERT, severe AS (s/p TAVR & PPM 12/17 [employer] Model [id number]), h/o MVR, PMR on chronic steroids, asthma, OA, s/p TKR with recent joint infection s/p explant and abx spacer on 12/23/17, + rehab when had RLE DVT (dvt proph was held 2nd nose bleed)  on Coumadins/p  3/23/2018 Right knee explantation of previously placed articulating antibiotic spacer, irrigation and debridement of the knee, placement of static antibiotic spacer, with a Plastic Surgery soft tissue complex wound closure.     Pt's hospital course c/b skin ulcerations on b/l thighs and abdomen. Debridement has not helped. Pt says lesions are painful, sometimes oozing.      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  Artificial cardiac pacemaker: 12/25/17  H/O cardiac catheterization: 11/29/16 HERBERT placed on LAD  H/O endoscopy: with dilatation of esophageal stricture 2013  S/P cataract surgery: x2; 3-4yr ago  S/P gastroplasty: 28 yrs ago  S/P cholecystectomy: more than 15 years ago  S/P total knee replacement: left, 15yr ago  S/P total knee replacement: right, 12yr ago  S/P hysterectomy: 24yr ago      REVIEW OF SYSTEMS      General: no fevers/chills, no lethary	    Skin/Breast: see HPI  	  Ophthalmologic: no eye pain or change in vision  	  ENMT: no dysphagia or change in hearing    Respiratory and Thorax: no SOB or cough  	  Cardiovascular: no palpitations or chest pain    Gastrointestinal: no abdomenal pain or blood in stool     Genitourinary: no dysuria or frequency    Musculoskeletal: no joint pains or weakness	    Neurological:no weakness, numbness , or tingling    MEDICATIONS  (STANDING):  ascorbic acid 500 milliGRAM(s) Oral daily  aspirin enteric coated 81 milliGRAM(s) Oral daily  buDESOnide 160 MICROgram(s)/formoterol 4.5 MICROgram(s) Inhaler 2 Puff(s) Inhalation two times a day  clotrimazole/betamethasone Cream 1 Application(s) Topical two times a day  docusate sodium 100 milliGRAM(s) Oral three times a day  epoetin jenni Injectable 10874 Unit(s) SubCutaneous every 7 days  ferrous    sulfate 325 milliGRAM(s) Oral daily  folic acid 1 milliGRAM(s) Oral daily  gabapentin 200 milliGRAM(s) Oral three times a day  melatonin 3 milliGRAM(s) Oral at bedtime  minocycline 100 milliGRAM(s) Oral two times a day  multivitamin 1 Tablet(s) Oral daily  nystatin    Suspension 735936 Unit(s) Oral every 6 hours  pantoprazole    Tablet 40 milliGRAM(s) Oral before breakfast  polyethylene glycol 3350 17 Gram(s) Oral daily  povidone iodine 10% Solution 1 Application(s) Topical every 12 hours  tiotropium 18 MICROgram(s) Capsule 1 Capsule(s) Inhalation daily  warfarin 1 milliGRAM(s) Oral once  zinc oxide 20% Ointment 1 Application(s) Topical daily    MEDICATIONS  (PRN):  acetaminophen   Tablet 650 milliGRAM(s) Oral every 6 hours PRN For Temp greater than 38 C (100.4 F)  acetaminophen   Tablet. 650 milliGRAM(s) Oral every 6 hours PRN Mild Pain (1 - 3)  bisacodyl Suppository 10 milliGRAM(s) Rectal daily PRN Constipation  HYDROmorphone   Tablet 4 milliGRAM(s) Oral every 4 hours PRN Severe Pain (7 - 10)  HYDROmorphone   Tablet 2 milliGRAM(s) Oral every 4 hours PRN Moderate Pain (4 - 6)  HYDROmorphone  Injectable 0.5 milliGRAM(s) IV Push daily PRN Dressing change  lidocaine 2% Gel 1 Application(s) Topical daily PRN dressing change  lidocaine 2% Viscous 10 milliLiter(s) Swish and Spit every 6 hours PRN Mouth Sores  senna 2 Tablet(s) Oral at bedtime PRN Constipation      Allergies    amoxicillin (Other)    Intolerances    IV Contrast (Flushing (Skin); Nausea)      SOCIAL HISTORY:    FAMILY HISTORY:  No pertinent family history in first degree relatives      Vital Signs Last 24 Hrs  T(C): 36.5 (23 May 2018 14:21), Max: 36.5 (22 May 2018 19:31)  T(F): 97.7 (23 May 2018 14:21), Max: 97.7 (22 May 2018 19:31)  HR: 87 (23 May 2018 14:21) (67 - 87)  BP: 127/74 (23 May 2018 14:21) (119/67 - 141/81)  BP(mean): --  RR: 18 (23 May 2018 14:21) (18 - 20)  SpO2: 92% (23 May 2018 14:21) (92% - 97%)    PHYSICAL EXAM:     The patient was alert and oriented X 3, well nourished, and in no  apparent distress.  OP showed no ulcerations  There was no visible lymphadenopathy.  Conjunctiva were non injected  There was no clubbing or edema of extremities.  The scalp, hair, face, eyebrows, lips, OP, neck, chest, back,   extremities X 4, nails were examined.  There was no hyperhidrosis or bromhidrosis.    Of note on skin exam:     scattered ulcerations, some with undermined borders and drainage on abdomen, and b/l thighs      LABS:                        8.0    11.33 )-----------( 304      ( 23 May 2018 08:19 )             26.5     05-22    140  |  100  |  16  ----------------------------<  118<H>  4.4   |  31  |  0.62    Ca    9.3      22 May 2018 07:31      PT/INR - ( 23 May 2018 09:47 )   PT: 17.5 sec;   INR: 1.54 ratio               RADIOLOGY & ADDITIONAL STUDIES:

## 2018-05-23 NOTE — PROGRESS NOTE ADULT - SUBJECTIVE AND OBJECTIVE BOX
PLASTIC SURGERY DAILY PROGRESS NOTE:    SUBJECTIVE:  Patient seen and examined. No acute events overnight.     OBJECTIVE:   Vital Signs Last 24 Hrs  T(C): 36.4 (05-23-18 @ 06:58), Max: 36.5 (05-22-18 @ 19:31)  HR: 67 (05-23-18 @ 06:58) (67 - 75)  BP: 141/81 (05-23-18 @ 06:58) (119/67 - 148/77)  BP(mean): --  ABP: --  ABP(mean): --  RR: 20 (05-23-18 @ 06:58) (18 - 20)  SpO2: 97% (05-22-18 @ 19:31) (94% - 97%)  Wt(kg): --  CVP(mm Hg): --  CI: --  CAPILLARY BLOOD GLUCOSE       N/A      05-22 @ 07:01  -  05-23 @ 07:00  --------------------------------------------------------  IN:    Oral Fluid: 600 mL  Total IN: 600 mL    OUT:    Voided: 350 mL  Total OUT: 350 mL    Total NET: 250 mL          MEDICATIONS  (STANDING):  ascorbic acid 500 milliGRAM(s) Oral daily  aspirin enteric coated 81 milliGRAM(s) Oral daily  buDESOnide 160 MICROgram(s)/formoterol 4.5 MICROgram(s) Inhaler 2 Puff(s) Inhalation two times a day  clotrimazole/betamethasone Cream 1 Application(s) Topical two times a day  docusate sodium 100 milliGRAM(s) Oral three times a day  epoetin jenni Injectable 63747 Unit(s) SubCutaneous every 7 days  ferrous    sulfate 325 milliGRAM(s) Oral daily  folic acid 1 milliGRAM(s) Oral daily  gabapentin 200 milliGRAM(s) Oral three times a day  lactulose Syrup 10 Gram(s) Oral every 6 hours  melatonin 3 milliGRAM(s) Oral at bedtime  minocycline 100 milliGRAM(s) Oral two times a day  multivitamin 1 Tablet(s) Oral daily  nystatin    Suspension 047286 Unit(s) Oral every 6 hours  pantoprazole    Tablet 40 milliGRAM(s) Oral before breakfast  polyethylene glycol 3350 17 Gram(s) Oral daily  povidone iodine 10% Solution 1 Application(s) Topical every 12 hours  tiotropium 18 MICROgram(s) Capsule 1 Capsule(s) Inhalation daily  zinc oxide 20% Ointment 1 Application(s) Topical daily    MEDICATIONS  (PRN):  acetaminophen   Tablet 650 milliGRAM(s) Oral every 6 hours PRN For Temp greater than 38 C (100.4 F)  acetaminophen   Tablet. 650 milliGRAM(s) Oral every 6 hours PRN Mild Pain (1 - 3)  bisacodyl Suppository 10 milliGRAM(s) Rectal daily PRN Constipation  HYDROmorphone   Tablet 4 milliGRAM(s) Oral every 4 hours PRN Severe Pain (7 - 10)  HYDROmorphone   Tablet 2 milliGRAM(s) Oral every 4 hours PRN Moderate Pain (4 - 6)  HYDROmorphone  Injectable 0.5 milliGRAM(s) IV Push daily PRN Dressing change  lidocaine 2% Gel 1 Application(s) Topical daily PRN dressing change  lidocaine 2% Viscous 10 milliLiter(s) Swish and Spit every 6 hours PRN Mouth Sores  senna 2 Tablet(s) Oral at bedtime PRN Constipation    ** PHYSICAL EXAM **  -- CONSTITUTIONAL: AOx3. NAD.   -- ABDOMEN: wound with NPWT, holding suction  -- EXTREMITIES: The right thigh wound has a VAC in place. Stable eschar overlying patella, painted with betadine.  Medial wound with wet to dry gauze.

## 2018-05-23 NOTE — CONSULT NOTE ADULT - PROBLEM SELECTOR RECOMMENDATION 9
Would suggest change po regimen to:  PO Dilaudid 2mg q4hrs prn moderate pain 6-8/10  PO Dilaudid 4mg q4hrs prn severe pain 9-10/10  Consider Gabapentin 200mg TID for neuropathic pain, titrate to effectiveness  Bowel regimen
punch bx from newest ulcer on right thigh X 2 (for H&E and tissue cx)    would avoid further debridement as this could worsen ulcerations if they are PG.  recommend mupirocin ointment BID to open areas.    4mm punch bx performed of R thigh.  Wound was closed with gel foam, no sutures placed.    derm to follow    Alternatives to this procedure, risks and benefits were discussed. Risks include but are not limited to bleeding, infection and scarring. Informed consent was obtained verbally. The lesion was cleaned with alcohol and anesthetized with 1% lidocaine with epinephrine. A 4mm punch biopsy was performed and the wound was packed with gel foam. Vaseline and a bandaid were applied. The specimen was sent to pathology. The patient tolerated the procedure well with minimal blood loss and no complications. .
multifactorial-atelectasis, asthma, cardiac disease, poor mechanics

## 2018-05-23 NOTE — PROGRESS NOTE ADULT - ASSESSMENT
76 yo F PMHx including HLD, GERD, Anxiety, Anemia, CAD s/p HERBERT, severe AS (s/p TAVR & PPM 12/17 [employer] Model [id number]), 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     # Recent septic Rt TKR - abx spacer exchanged and plastics complex wound closure  # PMR on chronic steroids  # right knee eschar, right thigh necrosis and abd wound   #Multiple deep skin wounds  # Anemia chronic disease    Plan:  wound care and wound vac per plastics and ortho  s/p multiple debridements  New lt thigh wounds-unclear why new wounds forming in non pressure areas- Derm c/s called  remains afebrile, cont suppressive minocycline, appreciate ID f/u  appreciate heme recs, started on procrit for anemia of chronic disease  cont coumadin and asa  dw plastics and considering OR debridement with anesthesia for abdominal wound - pre-op eval noted in 5/22 progress note  appreciate pain management recs  PT OOB no knee motion, immobilizer if out of bed  bowel regimen    plan of care dw patient and NP

## 2018-05-24 LAB
ANION GAP SERPL CALC-SCNC: 8 MMOL/L — SIGNIFICANT CHANGE UP (ref 5–17)
BUN SERPL-MCNC: 16 MG/DL — SIGNIFICANT CHANGE UP (ref 7–23)
CALCIUM SERPL-MCNC: 9.4 MG/DL — SIGNIFICANT CHANGE UP (ref 8.4–10.5)
CHLORIDE SERPL-SCNC: 100 MMOL/L — SIGNIFICANT CHANGE UP (ref 96–108)
CO2 SERPL-SCNC: 30 MMOL/L — SIGNIFICANT CHANGE UP (ref 22–31)
CREAT SERPL-MCNC: 0.66 MG/DL — SIGNIFICANT CHANGE UP (ref 0.5–1.3)
GLUCOSE SERPL-MCNC: 99 MG/DL — SIGNIFICANT CHANGE UP (ref 70–99)
HCT VFR BLD CALC: 25.8 % — LOW (ref 34.5–45)
HGB BLD-MCNC: 7.9 G/DL — LOW (ref 11.5–15.5)
INR BLD: 1.45 RATIO — HIGH (ref 0.88–1.16)
MCHC RBC-ENTMCNC: 27.4 PG — SIGNIFICANT CHANGE UP (ref 27–34)
MCHC RBC-ENTMCNC: 30.6 GM/DL — LOW (ref 32–36)
MCV RBC AUTO: 89.6 FL — SIGNIFICANT CHANGE UP (ref 80–100)
NIGHT BLUE STAIN TISS: SIGNIFICANT CHANGE UP
PLATELET # BLD AUTO: 314 K/UL — SIGNIFICANT CHANGE UP (ref 150–400)
POTASSIUM SERPL-MCNC: 4.9 MMOL/L — SIGNIFICANT CHANGE UP (ref 3.5–5.3)
POTASSIUM SERPL-SCNC: 4.9 MMOL/L — SIGNIFICANT CHANGE UP (ref 3.5–5.3)
PROTHROM AB SERPL-ACNC: 16.5 SEC — HIGH (ref 10–13.1)
RBC # BLD: 2.88 M/UL — LOW (ref 3.8–5.2)
RBC # FLD: 19.7 % — HIGH (ref 10.3–14.5)
SODIUM SERPL-SCNC: 138 MMOL/L — SIGNIFICANT CHANGE UP (ref 135–145)
SPECIMEN SOURCE: SIGNIFICANT CHANGE UP
WBC # BLD: 10.96 K/UL — HIGH (ref 3.8–10.5)
WBC # FLD AUTO: 10.96 K/UL — HIGH (ref 3.8–10.5)

## 2018-05-24 RX ORDER — WARFARIN SODIUM 2.5 MG/1
1 TABLET ORAL ONCE
Qty: 0 | Refills: 0 | Status: COMPLETED | OUTPATIENT
Start: 2018-05-24 | End: 2018-05-24

## 2018-05-24 RX ADMIN — GABAPENTIN 200 MILLIGRAM(S): 400 CAPSULE ORAL at 06:16

## 2018-05-24 RX ADMIN — Medication 1 APPLICATION(S): at 18:28

## 2018-05-24 RX ADMIN — Medication 3 MILLIGRAM(S): at 21:46

## 2018-05-24 RX ADMIN — BUDESONIDE AND FORMOTEROL FUMARATE DIHYDRATE 2 PUFF(S): 160; 4.5 AEROSOL RESPIRATORY (INHALATION) at 06:16

## 2018-05-24 RX ADMIN — Medication 81 MILLIGRAM(S): at 14:02

## 2018-05-24 RX ADMIN — Medication 500000 UNIT(S): at 14:02

## 2018-05-24 RX ADMIN — ZINC OXIDE 1 APPLICATION(S): 200 OINTMENT TOPICAL at 14:03

## 2018-05-24 RX ADMIN — MUPIROCIN 1 APPLICATION(S): 20 OINTMENT TOPICAL at 06:16

## 2018-05-24 RX ADMIN — Medication 1 APPLICATION(S): at 06:17

## 2018-05-24 RX ADMIN — Medication 100 MILLIGRAM(S): at 06:16

## 2018-05-24 RX ADMIN — GABAPENTIN 200 MILLIGRAM(S): 400 CAPSULE ORAL at 21:45

## 2018-05-24 RX ADMIN — Medication 500000 UNIT(S): at 06:16

## 2018-05-24 RX ADMIN — PANTOPRAZOLE SODIUM 40 MILLIGRAM(S): 20 TABLET, DELAYED RELEASE ORAL at 06:16

## 2018-05-24 RX ADMIN — Medication 1 MILLIGRAM(S): at 14:02

## 2018-05-24 RX ADMIN — TIOTROPIUM BROMIDE 1 CAPSULE(S): 18 CAPSULE ORAL; RESPIRATORY (INHALATION) at 14:02

## 2018-05-24 RX ADMIN — BUDESONIDE AND FORMOTEROL FUMARATE DIHYDRATE 2 PUFF(S): 160; 4.5 AEROSOL RESPIRATORY (INHALATION) at 18:26

## 2018-05-24 RX ADMIN — CLOTRIMAZOLE AND BETAMETHASONE DIPROPIONATE 1 APPLICATION(S): 10; .5 CREAM TOPICAL at 06:17

## 2018-05-24 RX ADMIN — HYDROMORPHONE HYDROCHLORIDE 4 MILLIGRAM(S): 2 INJECTION INTRAMUSCULAR; INTRAVENOUS; SUBCUTANEOUS at 14:54

## 2018-05-24 RX ADMIN — MINOCYCLINE HYDROCHLORIDE 100 MILLIGRAM(S): 45 TABLET, EXTENDED RELEASE ORAL at 06:16

## 2018-05-24 RX ADMIN — GABAPENTIN 200 MILLIGRAM(S): 400 CAPSULE ORAL at 14:03

## 2018-05-24 RX ADMIN — HYDROMORPHONE HYDROCHLORIDE 4 MILLIGRAM(S): 2 INJECTION INTRAMUSCULAR; INTRAVENOUS; SUBCUTANEOUS at 15:48

## 2018-05-24 RX ADMIN — HYDROMORPHONE HYDROCHLORIDE 4 MILLIGRAM(S): 2 INJECTION INTRAMUSCULAR; INTRAVENOUS; SUBCUTANEOUS at 06:14

## 2018-05-24 RX ADMIN — Medication 100 MILLIGRAM(S): at 21:46

## 2018-05-24 RX ADMIN — HYDROMORPHONE HYDROCHLORIDE 4 MILLIGRAM(S): 2 INJECTION INTRAMUSCULAR; INTRAVENOUS; SUBCUTANEOUS at 07:00

## 2018-05-24 RX ADMIN — Medication 500 MILLIGRAM(S): at 14:03

## 2018-05-24 RX ADMIN — MINOCYCLINE HYDROCHLORIDE 100 MILLIGRAM(S): 45 TABLET, EXTENDED RELEASE ORAL at 18:27

## 2018-05-24 RX ADMIN — Medication 1 TABLET(S): at 14:02

## 2018-05-24 RX ADMIN — Medication 500000 UNIT(S): at 21:46

## 2018-05-24 RX ADMIN — CLOTRIMAZOLE AND BETAMETHASONE DIPROPIONATE 1 APPLICATION(S): 10; .5 CREAM TOPICAL at 18:27

## 2018-05-24 RX ADMIN — MUPIROCIN 1 APPLICATION(S): 20 OINTMENT TOPICAL at 18:26

## 2018-05-24 RX ADMIN — WARFARIN SODIUM 1 MILLIGRAM(S): 2.5 TABLET ORAL at 21:46

## 2018-05-24 RX ADMIN — Medication 500000 UNIT(S): at 18:26

## 2018-05-24 RX ADMIN — Medication 325 MILLIGRAM(S): at 14:02

## 2018-05-24 NOTE — CHART NOTE - NSCHARTNOTEFT_GEN_A_CORE
House Rheumatology called to evaluate patient for consult  Dr Goldberg ( Rheumatology [facility]) had seen the patient on 4/8/18   I contacted Dr Goldberg and discussed the case, he will follow up on the patient  Please call back house rheumatology if needed    Corina Dunne MD   Rheumatology Fellow  Pager: [phone] House Rheumatology called to evaluate patient for consult  Dr Goldberg ( Rheumatology [facility]) had seen the patient on 4/8/18   I contacted Dr Goldberg and discussed the case, he will follow up on the patient during her hospitalization   Please call back house rheumatology if needed    Corina Dunne MD   Rheumatology Fellow  Pager: [phone]

## 2018-05-24 NOTE — PROGRESS NOTE ADULT - ASSESSMENT
no plan for orthopedic surgical intervention per family and patient preference  local wound care to knee and vac therapy to abd/thigh per plastics and wound care teams  plastics team considering abdomen wound I&D but patient considered high risk for surgery so plan for nonop at moment per primary team  new nonpressure thigh lesions forming being managed by derm, rheum, wound care, plastics, +gnr on microscopy but not cultures, pending plan from these team re this new dx  PO abx per ID team  encourage patient to spend majority of bed oob in bedside chair as able  PT to help mobilize, she must remain 50% wb on the RLE and NO KNEE MOTION allowed, knee immobilizer if oob  coumadin, inr goal 2-3, currently sub-therapeutic  continue to optimize nutrition  continue to involve psych

## 2018-05-24 NOTE — PROGRESS NOTE ADULT - ASSESSMENT
Imp;   78 yo female with a h/o PMR; she has been off steroids for the last few months and has not had any flare up.  She has h/o septic R knee s/p removal of hardware and is in hospital due to wound care issues of the knee as well as the abdomen.  There is no evidence of an active rheumatologic process; PMR seems quiescent.    Rec:  continue to hold steroids

## 2018-05-24 NOTE — PROGRESS NOTE ADULT - SUBJECTIVE AND OBJECTIVE BOX
Pt seen and examined. Doing well. No acute events o/n.     T(C): 36.7 (05-24-18 @ 06:20), Max: 36.7 (05-24-18 @ 06:20)  T(F): 98 (05-24-18 @ 06:20), Max: 98 (05-24-18 @ 06:20)  HR: 72 (05-24-18 @ 06:20) (70 - 87)  BP: 128/72 (05-24-18 @ 06:20) (127/74 - 140/67)  RR: 18 (05-24-18 @ 06:20) (18 - 18)  SpO2: 94% (05-24-18 @ 06:20) (92% - 94%)      23 May 2018 07:01  -  24 May 2018 07:00  --------------------------------------------------------  IN:    Oral Fluid: 1180 mL  Total IN: 1180 mL    OUT:    Voided: 600 mL  Total OUT: 600 mL    Total NET: 580 mL          Exam:  RLE knee wound stable, fibrinous exudate at base. Eschar   Proximal RLE and abdominal wound vacs in place.                          8.0    11.33 )-----------( 304      ( 23 May 2018 08:19 )             26.5     05-24    138  |  100  |  16  ----------------------------<  99  4.9   |  30  |  0.66    Ca    9.4      24 May 2018 06:07        Plan:

## 2018-05-24 NOTE — CONSULT NOTE ADULT - SUBJECTIVE AND OBJECTIVE BOX
IRASEMA KOHLER  [id number]    HISTORY OF PRESENT ILLNESS:      76yo W, PMH of HLD, GERD, Anxiety, Anemia, CAD,  severe AS (s/p TAVR ), h/o MVR, PMR on chronic steroids, asthma, OA, s/p TKR with recent joint infection s/p explant and abx spacer on 17, + rehab when had RLE DVT (dvt proph was held 2nd nose bleed)  on Coumadin s/p  3/23/2018 , Right knee explantation of previously placed articulating antibiotic spacer, irrigation and debridement of the knee, placement of static antibiotic spacer, with a Plastic Surgery soft tissue complex wound closure.     Pt's hospital course c/b skin ulcerations on b/l thighs and abdomen s/p debridement  no evidence of trauma/pressure related ulcerations     Evaluated yesterday by dermatology, differential include infectious vs PG, s/p punch biopsy of skin lesions on thigh       PAST MEDICAL & SURGICAL HISTORY:  CAD (coronary artery disease): HERBERT to LAD 2016  Aortic stenosis, severe  Uncomplicated asthma, unspecified asthma severity  Polymyalgia  Lumbar disc disease with radiculopathy  Spider veins  Anxiety  Severe aortic stenosis by prior echocardiogram:  and  2016  Dysphagia  Macular degeneration  Hiatal hernia  GERD (gastroesophageal reflux disease)  Sciatica  Osteoarthritis  S/P TKR (total knee replacement): joint infection s/p explant and abx spacer on 17  S/P TAVR (transcatheter aortic valve replacement): 17  Artificial cardiac pacemaker: 17  H/O cardiac catheterization: 16 HERBERT placed on LAD  H/O endoscopy: with dilatation of esophageal stricture   S/P cataract surgery: x2; 3-4yr ago  S/P gastroplasty: 28 yrs ago  S/P cholecystectomy: more than 15 years ago  S/P total knee replacement: left, 15yr ago  S/P total knee replacement: right, 12yr ago  S/P hysterectomy: 24yr ago      Review of Systems:      MEDICATIONS  (STANDING):  ascorbic acid 500 milliGRAM(s) Oral daily  aspirin enteric coated 81 milliGRAM(s) Oral daily  buDESOnide 160 MICROgram(s)/formoterol 4.5 MICROgram(s) Inhaler 2 Puff(s) Inhalation two times a day  clotrimazole/betamethasone Cream 1 Application(s) Topical two times a day  docusate sodium 100 milliGRAM(s) Oral three times a day  epoetin jenni Injectable 99945 Unit(s) SubCutaneous every 7 days  ferrous    sulfate 325 milliGRAM(s) Oral daily  folic acid 1 milliGRAM(s) Oral daily  gabapentin 200 milliGRAM(s) Oral three times a day  melatonin 3 milliGRAM(s) Oral at bedtime  minocycline 100 milliGRAM(s) Oral two times a day  multivitamin 1 Tablet(s) Oral daily  mupirocin 2% Ointment 1 Application(s) Topical two times a day  nystatin    Suspension 766989 Unit(s) Oral every 6 hours  pantoprazole    Tablet 40 milliGRAM(s) Oral before breakfast  polyethylene glycol 3350 17 Gram(s) Oral daily  povidone iodine 10% Solution 1 Application(s) Topical every 12 hours  tiotropium 18 MICROgram(s) Capsule 1 Capsule(s) Inhalation daily  warfarin 1 milliGRAM(s) Oral once  zinc oxide 20% Ointment 1 Application(s) Topical daily        Allergies    amoxicillin (Other)    Intolerances    IV Contrast (Flushing (Skin); Nausea)      PERTINENT MEDICATION HISTORY:    SOCIAL HISTORY:  OCCUPATION:  TRAVEL HISTORY:    FAMILY HISTORY:  No pertinent family history in first degree relatives      Vital Signs Last 24 Hrs  T(C): 36.7 (24 May 2018 06:20), Max: 36.7 (24 May 2018 06:20)  T(F): 98 (24 May 2018 06:20), Max: 98 (24 May 2018 06:20)  HR: 72 (24 May 2018 06:20) (70 - 87)  BP: 128/72 (24 May 2018 06:20) (127/74 - 140/67)  BP(mean): --  RR: 18 (24 May 2018 06:20) (18 - 18)  SpO2: 94% (24 May 2018 06:20) (92% - 94%)    Daily     Daily Weight in k.5 (24 May 2018 05:15)    Physical Exam:  General: No apparent distress  HEENT: EOMI, MMM  CVS: +S1/S2, RRR  Resp: CTA b/l  GI: Soft, NT/ND  MSK:    Neuro: AAOx3  muscle strength RUE LUE ; RLE LLE   Skin: no  rashes    LABS:                        7.9    10.96 )-----------( 314      ( 24 May 2018 07:51 )             25.8         138  |  100  |  16  ----------------------------<  99  4.9   |  30  |  0.66    Ca    9.4      24 May 2018 06:07      PT/INR - ( 24 May 2018 07:50 )   PT: 16.5 sec;   INR: 1.45 ratio         Synovial fluid 17  cultures: Strep. Sanguinis   surgical site          cultures negative       Tissue right thigh /  stain negative  cx in progress    RADIOLOGY & ADDITIONAL STUDIES: IRASEMA KOHLER  [id number]    HISTORY OF PRESENT ILLNESS:      76yo W, PMH of HLD, GERD, Anxiety, Anemia, CAD,  severe AS (s/p TAVR ), h/o MVR, PMR on chronic steroids, asthma, OA, s/p TKR with recent joint infection s/p explant and abx spacer on 17, + rehab when had RLE DVT (dvt proph was held 2nd nose bleed)  on Coumadin s/p  3/23/2018 , Right knee explantation of previously placed articulating antibiotic spacer, irrigation and debridement of the knee, placement of static antibiotic spacer, with a Plastic Surgery soft tissue complex wound closure.     Pt's hospital course c/b skin ulcerations on b/l thighs and abdomen s/p debridement  no evidence of trauma/pressure related ulcerations     As per patient, she follows infrequently with Dr Meeks rheumatology for "arthritis", she is uncertain of the diagnosis, she states that she received prednisone for a short period of time but has been off any treatment for a while     Evaluated yesterday by dermatology, differential include infectious vs PG, s/p punch biopsy of skin lesions on thigh       PAST MEDICAL & SURGICAL HISTORY:  CAD (coronary artery disease): HERBERT to LAD 2016  Aortic stenosis, severe  Uncomplicated asthma, unspecified asthma severity  Polymyalgia  Lumbar disc disease with radiculopathy  Spider veins  Anxiety  Severe aortic stenosis by prior echocardiogram:  and  2016  Dysphagia  Macular degeneration  Hiatal hernia  GERD (gastroesophageal reflux disease)  Sciatica  Osteoarthritis  S/P TKR (total knee replacement): joint infection s/p explant and abx spacer on 17  S/P TAVR (transcatheter aortic valve replacement): 17  Artificial cardiac pacemaker: 17  H/O cardiac catheterization: 16 HERBERT placed on LAD  H/O endoscopy: with dilatation of esophageal stricture   S/P cataract surgery: x2; 3-4yr ago  S/P gastroplasty: 28 yrs ago  S/P cholecystectomy: more than 15 years ago  S/P total knee replacement: left, 15yr ago  S/P total knee replacement: right, 12yr ago  S/P hysterectomy: 24yr ago      Review of Systems:  denies any dry eyes or mouth  no oral ulcers  no prior skin lesions, rashes, or photosensitivity  no raynaud's   reports occasional pain and swelling over the knuckles, morning stiffness for about few minutes  no hx of miscarriages       MEDICATIONS  (STANDING):  ascorbic acid 500 milliGRAM(s) Oral daily  aspirin enteric coated 81 milliGRAM(s) Oral daily  buDESOnide 160 MICROgram(s)/formoterol 4.5 MICROgram(s) Inhaler 2 Puff(s) Inhalation two times a day  clotrimazole/betamethasone Cream 1 Application(s) Topical two times a day  docusate sodium 100 milliGRAM(s) Oral three times a day  epoetin jenni Injectable 28728 Unit(s) SubCutaneous every 7 days  ferrous    sulfate 325 milliGRAM(s) Oral daily  folic acid 1 milliGRAM(s) Oral daily  gabapentin 200 milliGRAM(s) Oral three times a day  melatonin 3 milliGRAM(s) Oral at bedtime  minocycline 100 milliGRAM(s) Oral two times a day  multivitamin 1 Tablet(s) Oral daily  mupirocin 2% Ointment 1 Application(s) Topical two times a day  nystatin    Suspension 173292 Unit(s) Oral every 6 hours  pantoprazole    Tablet 40 milliGRAM(s) Oral before breakfast  polyethylene glycol 3350 17 Gram(s) Oral daily  povidone iodine 10% Solution 1 Application(s) Topical every 12 hours  tiotropium 18 MICROgram(s) Capsule 1 Capsule(s) Inhalation daily  warfarin 1 milliGRAM(s) Oral once  zinc oxide 20% Ointment 1 Application(s) Topical daily        Allergies    amoxicillin (Other)    Intolerances    IV Contrast (Flushing (Skin); Nausea)      PERTINENT MEDICATION HISTORY: as above     SOCIAL HISTORY: denies       FAMILY HISTORY:  No pertinent family history in first degree relatives      Vital Signs Last 24 Hrs  T(C): 36.7 (24 May 2018 06:20), Max: 36.7 (24 May 2018 06:20)  T(F): 98 (24 May 2018 06:20), Max: 98 (24 May 2018 06:20)  HR: 72 (24 May 2018 06:20) (70 - 87)  BP: 128/72 (24 May 2018 06:20) (127/74 - 140/67)  BP(mean): --  RR: 18 (24 May 2018 06:20) (18 - 18)  SpO2: 94% (24 May 2018 06:20) (92% - 94%)    Daily     Daily Weight in k.5 (24 May 2018 05:15)    Physical Exam:  General: No apparent distress  HEENT: EOMI, MMM  CVS: +S1/S2, RRR  Resp: CTA b/l  GI: patient declined abdominal exam   MSK:  heberden's and bouchards node consistent with OA changes  no synovitis   right knee: tissue growth   left knee: trace effusion, scar of TK surgery , normal ROM   Neuro: AAOx3  Skin: right lower abdomen wound in dressing drain in place  right thigh lesion in dressing drain in place     LABS:                        7.9    10.96 )-----------( 314      ( 24 May 2018 07:51 )             25.8         138  |  100  |  16  ----------------------------<  99  4.9   |  30  |  0.66    Ca    9.4      24 May 2018 06:07      PT/INR - ( 24 May 2018 07:50 )   PT: 16.5 sec;   INR: 1.45 ratio         Synovial fluid 17  cultures: Strep. Sanguinis   surgical site          cultures negative       Tissue right thigh /  stain rare gram negative rods  cx in progress    RADIOLOGY & ADDITIONAL STUDIES:

## 2018-05-24 NOTE — CONSULT NOTE ADULT - CONSULT REQUESTED BY NAME
Adam Singh MD
Dr Singh
Dr Soraya Marrero
Dr. Bass
Dr. Murphy
Dr. Singh
ED
Medical team
Ortho
Patient's family
pt/family
Dr. Singh
Curt Rivera
Dr. Marrero

## 2018-05-24 NOTE — PROVIDER CONTACT NOTE (CRITICAL VALUE NOTIFICATION) - SITUATION
abnormal lab results   tissue specimen   no polymorphonuclear cells seen per low power field rare gram negative rods per oil power field

## 2018-05-24 NOTE — PROGRESS NOTE ADULT - SUBJECTIVE AND OBJECTIVE BOX
INTERVAL HPI/OVERNIGHT EVENTS:  Pt with minimal joint or muscle pains; some degree of leg pains.      MEDICATIONS  (STANDING):  ascorbic acid 500 milliGRAM(s) Oral daily  aspirin enteric coated 81 milliGRAM(s) Oral daily  buDESOnide 160 MICROgram(s)/formoterol 4.5 MICROgram(s) Inhaler 2 Puff(s) Inhalation two times a day  clotrimazole/betamethasone Cream 1 Application(s) Topical two times a day  docusate sodium 100 milliGRAM(s) Oral three times a day  epoetin jenni Injectable 19645 Unit(s) SubCutaneous every 7 days  ferrous    sulfate 325 milliGRAM(s) Oral daily  folic acid 1 milliGRAM(s) Oral daily  gabapentin 200 milliGRAM(s) Oral three times a day  melatonin 3 milliGRAM(s) Oral at bedtime  minocycline 100 milliGRAM(s) Oral two times a day  multivitamin 1 Tablet(s) Oral daily  mupirocin 2% Ointment 1 Application(s) Topical two times a day  nystatin    Suspension 464168 Unit(s) Oral every 6 hours  pantoprazole    Tablet 40 milliGRAM(s) Oral before breakfast  polyethylene glycol 3350 17 Gram(s) Oral daily  povidone iodine 10% Solution 1 Application(s) Topical every 12 hours  tiotropium 18 MICROgram(s) Capsule 1 Capsule(s) Inhalation daily  warfarin 1 milliGRAM(s) Oral once  zinc oxide 20% Ointment 1 Application(s) Topical daily    MEDICATIONS  (PRN):  acetaminophen   Tablet 650 milliGRAM(s) Oral every 6 hours PRN For Temp greater than 38 C (100.4 F)  acetaminophen   Tablet. 650 milliGRAM(s) Oral every 6 hours PRN Mild Pain (1 - 3)  bisacodyl Suppository 10 milliGRAM(s) Rectal daily PRN Constipation  HYDROmorphone   Tablet 4 milliGRAM(s) Oral every 4 hours PRN Severe Pain (7 - 10)  HYDROmorphone   Tablet 2 milliGRAM(s) Oral every 4 hours PRN Moderate Pain (4 - 6)  HYDROmorphone  Injectable 0.5 milliGRAM(s) IV Push daily PRN Dressing change  lidocaine 2% Gel 1 Application(s) Topical daily PRN dressing change  lidocaine 2% Viscous 10 milliLiter(s) Swish and Spit every 6 hours PRN Mouth Sores  senna 2 Tablet(s) Oral at bedtime PRN Constipation      Allergies    amoxicillin (Other)    Intolerances    IV Contrast (Flushing (Skin); Nausea)      	  	    Vital Signs Last 24 Hrs  T(C): 36.8 (24 May 2018 15:15), Max: 36.8 (24 May 2018 15:15)  T(F): 98.2 (24 May 2018 15:15), Max: 98.2 (24 May 2018 15:15)  HR: 82 (24 May 2018 15:15) (70 - 82)  BP: 122/72 (24 May 2018 15:15) (122/72 - 140/67)  BP(mean): --  RR: 18 (24 May 2018 15:15) (18 - 18)  SpO2: 95% (24 May 2018 15:15) (94% - 95%)      PHYSICAL EXAM:    Constitutional:  Fair appearing    Eyes:    ENMT:    Neck:  supple , no masses    Back:    Respiratory:    Cardiovascular:    Gastrointestinal:  abdominal wound dressed; abd is soft and non tender    Extremities:  +2 distal pulses  Vascular:    Neurological:    Skin:  +wounds noted on R leg and abdomen; wounds are bandaged    Lymph Nodes:    Musculoskeletal:  No active synovitis noted; good ROM of shoulders/wrists/elbows.  R knee wrapped due to wound    Psychiatric:  awake and alert    LABS:                        7.9    10.96 )-----------( 314      ( 24 May 2018 07:51 )             25.8     05-24    138  |  100  |  16  ----------------------------<  99  4.9   |  30  |  0.66    Ca    9.4      24 May 2018 06:07      PT/INR - ( 24 May 2018 07:50 )   PT: 16.5 sec;   INR: 1.45 ratio               RADIOLOGY & ADDITIONAL TESTS:

## 2018-05-24 NOTE — PROGRESS NOTE ADULT - SUBJECTIVE AND OBJECTIVE BOX
pain controlled, afebrile  derm performed biopsy yesterday, one is demonstrating gnr    abdominal wound - vac in place  RLE  thigh vac in place  surgical wound intact with stable eschar, some separation of eschar medially from skin, no exudate, dressed by prs/wound care  5/5 ta/ehl/gcs  silt l4-s1  2+ dp pulse

## 2018-05-24 NOTE — CONSULT NOTE ADULT - PROVIDER SPECIALTY LIST ADULT
Cardiology
Dermatology
Heme/Onc
Infectious Disease
Nephrology
Orthopedics
Plastic Surgery
Pulmonology
Rheumatology
Rheumatology
Surgery
Wound Care
Heme/Onc
Pain Medicine

## 2018-05-24 NOTE — CONSULT NOTE ADULT - ASSESSMENT
76yo W, PMH of HLD, GERD, Anxiety, Anemia, CAD,  severe AS (s/p TAVR ), h/o MVR,, asthma, OA, s/p TKR with recent joint infection s/p explant and abx spacer on 12/23/17, now wih development of skin ulcerations on thighs and abdomen s/p debridement and wound vac , s/p punch biopsy by derm of skin lesions on thigh  Rheumatology consulted for evaluation of an underlying rheumatological disease     -As per patient, she follows infrequently with Rheumatologist Dr Meeks for "arthritis", she is uncertain of the diagnosis, she states that she received prednisone for a short period of time but has been off any treatment for a while  call placed for Dr Meeks to further discuss patient's prior diagnosis, awaiting call back    - Based on the patient's history and examination: Patient doesn't appear to have any clinical stigmata of an underlying inflammatory rheumatological disease 76yo W, PMH of HLD, GERD, Anxiety, Anemia, CAD,  severe AS (s/p TAVR ), h/o MVR,, asthma, OA, s/p TKR with recent joint infection s/p explant and abx spacer on 12/23/17, now wih development of skin ulcerations on thighs and abdomen s/p debridement and wound vac , s/p punch biopsy by derm of skin lesions on thigh  Rheumatology consulted for evaluation of an underlying rheumatological disease     -As per patient, she follows infrequently with Rheumatologist Dr Meeks for "arthritis", she is uncertain of the diagnosis, she states that she received prednisone for a short period of time but has been off any treatment for a while  call placed for Dr Meeks to further discuss patient's prior diagnosis, awaiting call back    - Based on the patient's history and examination: Patient doesn't appear to have any clinical stigmata of an underlying inflammatory rheumatological disease at this time  would recommend to follow up on pathology from punch biopsy to r/o pyoderma gangrenosum    Dr Goldberg ( Rheumatology  will follow with you

## 2018-05-24 NOTE — PROGRESS NOTE ADULT - SUBJECTIVE AND OBJECTIVE BOX
Patient is a 77y old  Female who presents with a chief complaint of fever (11 Apr 2018 14:25)      INTERVAL HPI/OVERNIGHT EVENTS:noted, feels well, s/p derm eval-wound punch biopsy      Vital Signs Last 24 Hrs  T(C): 36.7 (24 May 2018 06:20), Max: 36.7 (24 May 2018 06:20)  T(F): 98 (24 May 2018 06:20), Max: 98 (24 May 2018 06:20)  HR: 72 (24 May 2018 06:20) (70 - 72)  BP: 128/72 (24 May 2018 06:20) (128/72 - 140/67)  BP(mean): --  RR: 18 (24 May 2018 06:20) (18 - 18)  SpO2: 94% (24 May 2018 06:20) (94% - 94%)    acetaminophen   Tablet 650 milliGRAM(s) Oral every 6 hours PRN  acetaminophen   Tablet. 650 milliGRAM(s) Oral every 6 hours PRN  ascorbic acid 500 milliGRAM(s) Oral daily  aspirin enteric coated 81 milliGRAM(s) Oral daily  bisacodyl Suppository 10 milliGRAM(s) Rectal daily PRN  buDESOnide 160 MICROgram(s)/formoterol 4.5 MICROgram(s) Inhaler 2 Puff(s) Inhalation two times a day  clotrimazole/betamethasone Cream 1 Application(s) Topical two times a day  docusate sodium 100 milliGRAM(s) Oral three times a day  epoetin jenni Injectable 05805 Unit(s) SubCutaneous every 7 days  ferrous    sulfate 325 milliGRAM(s) Oral daily  folic acid 1 milliGRAM(s) Oral daily  gabapentin 200 milliGRAM(s) Oral three times a day  HYDROmorphone   Tablet 4 milliGRAM(s) Oral every 4 hours PRN  HYDROmorphone   Tablet 2 milliGRAM(s) Oral every 4 hours PRN  HYDROmorphone  Injectable 0.5 milliGRAM(s) IV Push daily PRN  lidocaine 2% Gel 1 Application(s) Topical daily PRN  lidocaine 2% Viscous 10 milliLiter(s) Swish and Spit every 6 hours PRN  melatonin 3 milliGRAM(s) Oral at bedtime  minocycline 100 milliGRAM(s) Oral two times a day  multivitamin 1 Tablet(s) Oral daily  mupirocin 2% Ointment 1 Application(s) Topical two times a day  nystatin    Suspension 933403 Unit(s) Oral every 6 hours  pantoprazole    Tablet 40 milliGRAM(s) Oral before breakfast  polyethylene glycol 3350 17 Gram(s) Oral daily  povidone iodine 10% Solution 1 Application(s) Topical every 12 hours  senna 2 Tablet(s) Oral at bedtime PRN  tiotropium 18 MICROgram(s) Capsule 1 Capsule(s) Inhalation daily  warfarin 1 milliGRAM(s) Oral once  zinc oxide 20% Ointment 1 Application(s) Topical daily      PHYSICAL EXAM:  GENERAL: NAD,   EYES: conjunctiva and sclera clear  ENMT: Moist mucous membranes  NECK: Supple, No JVD, Normal thyroid  NERVOUS SYSTEM:  Alert & Oriented X3,   CHEST/LUNG: Clear to auscultation bilaterally; No rales, rhonchi, wheezing, or rubs  HEART: Regular rate and rhythm; No murmurs, rubs, or gallops  ABDOMEN: lower abd wound vac,   EXTREMITIES:  rt knee eschar, rt thigh wound vac, lt thigh ulcers-packed and dressed  LYMPH: No lymphadenopathy noted  SKIN: No rashes or lesions    Consultant(s) Notes Reviewed:  [x ] YES  [ ] NO  Care Discussed with Consultants/Other Providers [ x] YES  [ ] NO    LABS:                        7.9    10.96 )-----------( 314      ( 24 May 2018 07:51 )             25.8     05-24    138  |  100  |  16  ----------------------------<  99  4.9   |  30  |  0.66    Ca    9.4      24 May 2018 06:07      PT/INR - ( 24 May 2018 07:50 )   PT: 16.5 sec;   INR: 1.45 ratio             CAPILLARY BLOOD GLUCOSE                RADIOLOGY & ADDITIONAL TESTS:    Imaging Personally Reviewed:  [ ] YES  [ ] NO

## 2018-05-24 NOTE — PROVIDER CONTACT NOTE (CRITICAL VALUE NOTIFICATION) - ASSESSMENT
No s/s of bleeding
patient alert and oriented vss, wound to right hip
pt alert , resting comfortably, remains asymptomatic
pt resting , asymptomatic
pt stable with previous INR 3.07

## 2018-05-24 NOTE — CONSULT NOTE ADULT - CONSULT REASON
Anticoagulation
PMR hx, fever
Presented to [facility] ED after having fevers at rehab
Pt here with normocytic hypochromic anemia and high rdw and history of anemia in the distant past
R/o R knee infection, s/p R knee abx spacer and I&D
Right thigh hematoma
Skin lesions r/o rheumatic diseases
fever, leukocytosis , septic right tkr
hx of acute kidney injury , need renal optimization prior to surgery
sob
wounds
Supratherapeutic INR
Rt thigh and abdominal wound-  Second opinion
Chronic pain

## 2018-05-24 NOTE — PROGRESS NOTE ADULT - ASSESSMENT
78 yo F PMHx including HLD, GERD, Anxiety, Anemia, CAD s/p HERBERT, severe AS (s/p TAVR & PPM 12/17 [employer] Model [id number]), 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     # Recent septic Rt TKR - abx spacer exchanged and plastics complex wound closure  # PMR on chronic steroids  # right knee eschar, right thigh necrosis and abd wound   #Multiple skin ulcers  # Anemia chronic disease    Plan:  wound care and wound vac per plastics and ortho  s/p multiple debridements  multiple ulcerated wounds: Derm c/s appreciated-f/u punch bx Cx and path  remains afebrile, cont suppressive minocycline, f/u ID  appreciate heme recs, started on procrit for anemia of chronic disease  cont coumadin and asa  per plastics and considering OR debridement with anesthesia for abdominal wound - pre-op eval noted in 5/22 progress note  appreciate pain management recs  PT OOB no knee motion, immobilizer if out of bed  bowel regimen

## 2018-05-24 NOTE — PROVIDER CONTACT NOTE (CRITICAL VALUE NOTIFICATION) - ACTION/TREATMENT ORDERED:
PA aware. Will continue to monitor
PA aware. Will continue to monitor
above already completed by time the result called to floor
continue plan of care for now.
monitor , repeat CBC in am 4/21/18
specimen sent to lab

## 2018-05-25 LAB
-  AMIKACIN: SIGNIFICANT CHANGE UP
-  AMOXICILLIN/CLAVULANIC ACID: SIGNIFICANT CHANGE UP
-  AMPICILLIN/SULBACTAM: SIGNIFICANT CHANGE UP
-  AMPICILLIN: SIGNIFICANT CHANGE UP
-  AZTREONAM: SIGNIFICANT CHANGE UP
-  CEFAZOLIN: SIGNIFICANT CHANGE UP
-  CEFEPIME: SIGNIFICANT CHANGE UP
-  CEFOXITIN: SIGNIFICANT CHANGE UP
-  CEFTRIAXONE: SIGNIFICANT CHANGE UP
-  CIPROFLOXACIN: SIGNIFICANT CHANGE UP
-  ERTAPENEM: SIGNIFICANT CHANGE UP
-  GENTAMICIN: SIGNIFICANT CHANGE UP
-  IMIPENEM: SIGNIFICANT CHANGE UP
-  LEVOFLOXACIN: SIGNIFICANT CHANGE UP
-  MEROPENEM: SIGNIFICANT CHANGE UP
-  PIPERACILLIN/TAZOBACTAM: SIGNIFICANT CHANGE UP
-  TOBRAMYCIN: SIGNIFICANT CHANGE UP
-  TRIMETHOPRIM/SULFAMETHOXAZOLE: SIGNIFICANT CHANGE UP
ANION GAP SERPL CALC-SCNC: 9 MMOL/L — SIGNIFICANT CHANGE UP (ref 5–17)
BUN SERPL-MCNC: 18 MG/DL — SIGNIFICANT CHANGE UP (ref 7–23)
CALCIUM SERPL-MCNC: 9.3 MG/DL — SIGNIFICANT CHANGE UP (ref 8.4–10.5)
CHLORIDE SERPL-SCNC: 98 MMOL/L — SIGNIFICANT CHANGE UP (ref 96–108)
CO2 SERPL-SCNC: 30 MMOL/L — SIGNIFICANT CHANGE UP (ref 22–31)
CREAT SERPL-MCNC: 0.74 MG/DL — SIGNIFICANT CHANGE UP (ref 0.5–1.3)
GLUCOSE SERPL-MCNC: 111 MG/DL — HIGH (ref 70–99)
HCT VFR BLD CALC: 26.9 % — LOW (ref 34.5–45)
HGB BLD-MCNC: 7.9 G/DL — LOW (ref 11.5–15.5)
INR BLD: 1.41 RATIO — HIGH (ref 0.88–1.16)
MCHC RBC-ENTMCNC: 26.7 PG — LOW (ref 27–34)
MCHC RBC-ENTMCNC: 29.4 GM/DL — LOW (ref 32–36)
MCV RBC AUTO: 90.9 FL — SIGNIFICANT CHANGE UP (ref 80–100)
METHOD TYPE: SIGNIFICANT CHANGE UP
PLATELET # BLD AUTO: 292 K/UL — SIGNIFICANT CHANGE UP (ref 150–400)
POTASSIUM SERPL-MCNC: 5 MMOL/L — SIGNIFICANT CHANGE UP (ref 3.5–5.3)
POTASSIUM SERPL-SCNC: 5 MMOL/L — SIGNIFICANT CHANGE UP (ref 3.5–5.3)
PROTHROM AB SERPL-ACNC: 16.1 SEC — HIGH (ref 10–13.1)
RBC # BLD: 2.96 M/UL — LOW (ref 3.8–5.2)
RBC # FLD: 20 % — HIGH (ref 10.3–14.5)
SODIUM SERPL-SCNC: 137 MMOL/L — SIGNIFICANT CHANGE UP (ref 135–145)
WBC # BLD: 10.28 K/UL — SIGNIFICANT CHANGE UP (ref 3.8–10.5)
WBC # FLD AUTO: 10.28 K/UL — SIGNIFICANT CHANGE UP (ref 3.8–10.5)

## 2018-05-25 RX ORDER — WARFARIN SODIUM 2.5 MG/1
1 TABLET ORAL ONCE
Qty: 0 | Refills: 0 | Status: COMPLETED | OUTPATIENT
Start: 2018-05-25 | End: 2018-05-25

## 2018-05-25 RX ADMIN — MUPIROCIN 1 APPLICATION(S): 20 OINTMENT TOPICAL at 17:09

## 2018-05-25 RX ADMIN — Medication 325 MILLIGRAM(S): at 12:08

## 2018-05-25 RX ADMIN — HYDROMORPHONE HYDROCHLORIDE 4 MILLIGRAM(S): 2 INJECTION INTRAMUSCULAR; INTRAVENOUS; SUBCUTANEOUS at 06:15

## 2018-05-25 RX ADMIN — Medication 1 APPLICATION(S): at 17:10

## 2018-05-25 RX ADMIN — Medication 1 APPLICATION(S): at 06:18

## 2018-05-25 RX ADMIN — Medication 1 MILLIGRAM(S): at 12:08

## 2018-05-25 RX ADMIN — GABAPENTIN 200 MILLIGRAM(S): 400 CAPSULE ORAL at 22:19

## 2018-05-25 RX ADMIN — HYDROMORPHONE HYDROCHLORIDE 4 MILLIGRAM(S): 2 INJECTION INTRAMUSCULAR; INTRAVENOUS; SUBCUTANEOUS at 10:21

## 2018-05-25 RX ADMIN — Medication 100 MILLIGRAM(S): at 06:17

## 2018-05-25 RX ADMIN — ZINC OXIDE 1 APPLICATION(S): 200 OINTMENT TOPICAL at 12:09

## 2018-05-25 RX ADMIN — HYDROMORPHONE HYDROCHLORIDE 0.5 MILLIGRAM(S): 2 INJECTION INTRAMUSCULAR; INTRAVENOUS; SUBCUTANEOUS at 09:46

## 2018-05-25 RX ADMIN — BUDESONIDE AND FORMOTEROL FUMARATE DIHYDRATE 2 PUFF(S): 160; 4.5 AEROSOL RESPIRATORY (INHALATION) at 17:09

## 2018-05-25 RX ADMIN — Medication 3 MILLIGRAM(S): at 22:20

## 2018-05-25 RX ADMIN — MUPIROCIN 1 APPLICATION(S): 20 OINTMENT TOPICAL at 06:16

## 2018-05-25 RX ADMIN — HYDROMORPHONE HYDROCHLORIDE 4 MILLIGRAM(S): 2 INJECTION INTRAMUSCULAR; INTRAVENOUS; SUBCUTANEOUS at 06:45

## 2018-05-25 RX ADMIN — Medication 100 MILLIGRAM(S): at 12:08

## 2018-05-25 RX ADMIN — MINOCYCLINE HYDROCHLORIDE 100 MILLIGRAM(S): 45 TABLET, EXTENDED RELEASE ORAL at 06:18

## 2018-05-25 RX ADMIN — WARFARIN SODIUM 1 MILLIGRAM(S): 2.5 TABLET ORAL at 22:19

## 2018-05-25 RX ADMIN — Medication 500000 UNIT(S): at 06:17

## 2018-05-25 RX ADMIN — MINOCYCLINE HYDROCHLORIDE 100 MILLIGRAM(S): 45 TABLET, EXTENDED RELEASE ORAL at 17:09

## 2018-05-25 RX ADMIN — POLYETHYLENE GLYCOL 3350 17 GRAM(S): 17 POWDER, FOR SOLUTION ORAL at 12:07

## 2018-05-25 RX ADMIN — TIOTROPIUM BROMIDE 1 CAPSULE(S): 18 CAPSULE ORAL; RESPIRATORY (INHALATION) at 12:08

## 2018-05-25 RX ADMIN — GABAPENTIN 200 MILLIGRAM(S): 400 CAPSULE ORAL at 06:18

## 2018-05-25 RX ADMIN — GABAPENTIN 200 MILLIGRAM(S): 400 CAPSULE ORAL at 12:08

## 2018-05-25 RX ADMIN — Medication 500 MILLIGRAM(S): at 12:08

## 2018-05-25 RX ADMIN — Medication 81 MILLIGRAM(S): at 12:08

## 2018-05-25 RX ADMIN — BUDESONIDE AND FORMOTEROL FUMARATE DIHYDRATE 2 PUFF(S): 160; 4.5 AEROSOL RESPIRATORY (INHALATION) at 06:16

## 2018-05-25 RX ADMIN — Medication 1 TABLET(S): at 12:08

## 2018-05-25 RX ADMIN — PANTOPRAZOLE SODIUM 40 MILLIGRAM(S): 20 TABLET, DELAYED RELEASE ORAL at 06:18

## 2018-05-25 RX ADMIN — CLOTRIMAZOLE AND BETAMETHASONE DIPROPIONATE 1 APPLICATION(S): 10; .5 CREAM TOPICAL at 17:09

## 2018-05-25 RX ADMIN — HYDROMORPHONE HYDROCHLORIDE 4 MILLIGRAM(S): 2 INJECTION INTRAMUSCULAR; INTRAVENOUS; SUBCUTANEOUS at 11:21

## 2018-05-25 RX ADMIN — Medication 500000 UNIT(S): at 12:08

## 2018-05-25 RX ADMIN — CLOTRIMAZOLE AND BETAMETHASONE DIPROPIONATE 1 APPLICATION(S): 10; .5 CREAM TOPICAL at 06:16

## 2018-05-25 RX ADMIN — HYDROMORPHONE HYDROCHLORIDE 4 MILLIGRAM(S): 2 INJECTION INTRAMUSCULAR; INTRAVENOUS; SUBCUTANEOUS at 18:52

## 2018-05-25 RX ADMIN — Medication 500000 UNIT(S): at 17:09

## 2018-05-25 RX ADMIN — HYDROMORPHONE HYDROCHLORIDE 0.5 MILLIGRAM(S): 2 INJECTION INTRAMUSCULAR; INTRAVENOUS; SUBCUTANEOUS at 09:16

## 2018-05-25 NOTE — PROGRESS NOTE ADULT - ASSESSMENT
A: 77F with complex PMH/PSH who recently had placement of a right knee antibiotic spacer followed by PRS closure c/b skin necrosis and formation of an eschar over the anterior knee joint. The patient was readmitted with fevers and right thigh cellulitis which was complicated by skin breakdown. Patient also developed wound at the right/mid lower quadrant of the abdomen.    P:  >> Dressing change TID to knee  >> Betadine to eschar bid  >> VACs on abdomen and R thigh to be changed today  >> Medical optimization as per primary team.  >> DVT prophylaxis.  >> Protein supplementation of diet to encourage wound healing.   >> f/u Dermatology bx  >> f/u Rheumatology    [facility] Plastic Surgery Pager -- [phone]  [facility] Plastic Surgery Pager -- [phone]

## 2018-05-25 NOTE — PROGRESS NOTE ADULT - SUBJECTIVE AND OBJECTIVE BOX
Patient is a 77y old  Female who presents with a chief complaint of fever (11 Apr 2018 14:25)      INTERVAL HPI/OVERNIGHT EVENTS:vnoted, feels well      Vital Signs Last 24 Hrs  T(C): 36.3 (25 May 2018 10:46), Max: 36.5 (25 May 2018 07:01)  T(F): 97.4 (25 May 2018 10:46), Max: 97.7 (25 May 2018 07:01)  HR: 74 (25 May 2018 14:45) (62 - 76)  BP: 124/74 (25 May 2018 10:46) (106/64 - 124/74)  BP(mean): --  RR: 18 (25 May 2018 10:46) (18 - 20)  SpO2: 97% (25 May 2018 14:45) (96% - 98%)    acetaminophen   Tablet 650 milliGRAM(s) Oral every 6 hours PRN  acetaminophen   Tablet. 650 milliGRAM(s) Oral every 6 hours PRN  ascorbic acid 500 milliGRAM(s) Oral daily  aspirin enteric coated 81 milliGRAM(s) Oral daily  bisacodyl Suppository 10 milliGRAM(s) Rectal daily PRN  buDESOnide 160 MICROgram(s)/formoterol 4.5 MICROgram(s) Inhaler 2 Puff(s) Inhalation two times a day  clotrimazole/betamethasone Cream 1 Application(s) Topical two times a day  docusate sodium 100 milliGRAM(s) Oral three times a day  epoetin jenni Injectable 27318 Unit(s) SubCutaneous every 7 days  ferrous    sulfate 325 milliGRAM(s) Oral daily  folic acid 1 milliGRAM(s) Oral daily  gabapentin 200 milliGRAM(s) Oral three times a day  HYDROmorphone   Tablet 4 milliGRAM(s) Oral every 4 hours PRN  HYDROmorphone   Tablet 2 milliGRAM(s) Oral every 4 hours PRN  HYDROmorphone  Injectable 0.5 milliGRAM(s) IV Push daily PRN  lidocaine 2% Gel 1 Application(s) Topical daily PRN  lidocaine 2% Viscous 10 milliLiter(s) Swish and Spit every 6 hours PRN  melatonin 3 milliGRAM(s) Oral at bedtime  minocycline 100 milliGRAM(s) Oral two times a day  multivitamin 1 Tablet(s) Oral daily  mupirocin 2% Ointment 1 Application(s) Topical two times a day  nystatin    Suspension 944228 Unit(s) Oral every 6 hours  pantoprazole    Tablet 40 milliGRAM(s) Oral before breakfast  polyethylene glycol 3350 17 Gram(s) Oral daily  povidone iodine 10% Solution 1 Application(s) Topical every 12 hours  senna 2 Tablet(s) Oral at bedtime PRN  tiotropium 18 MICROgram(s) Capsule 1 Capsule(s) Inhalation daily  warfarin 1 milliGRAM(s) Oral once  zinc oxide 20% Ointment 1 Application(s) Topical daily      PHYSICAL EXAM:  GENERAL: NAD,   EYES: conjunctiva and sclera clear  ENMT: Moist mucous membranes  NECK: Supple, No JVD, Normal thyroid  NERVOUS SYSTEM:  Alert & Oriented X3,   CHEST/LUNG: Clear to auscultation bilaterally; No rales, rhonchi, wheezing, or rubs  HEART: Regular rate and rhythm; No murmurs, rubs, or gallops  ABDOMEN: Soft, Nontender, Nondistended; Bowel sounds present  EXTREMITIES:  wound vacs, knee dressing, lt thigh ulcers dressing  LYMPH: No lymphadenopathy noted  SKIN: No rashes or lesions    Consultant(s) Notes Reviewed:  [x ] YES  [ ] NO  Care Discussed with Consultants/Other Providers [ x] YES  [ ] NO    LABS:                        7.9    10.28 )-----------( 292      ( 25 May 2018 09:15 )             26.9     05-25    137  |  98  |  18  ----------------------------<  111<H>  5.0   |  30  |  0.74    Ca    9.3      25 May 2018 07:15      PT/INR - ( 25 May 2018 09:21 )   PT: 16.1 sec;   INR: 1.41 ratio             CAPILLARY BLOOD GLUCOSE                RADIOLOGY & ADDITIONAL TESTS:    Imaging Personally Reviewed:  [ ] YES  [ ] NO

## 2018-05-25 NOTE — PROGRESS NOTE ADULT - ASSESSMENT
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     # Recent septic Rt TKR - abx spacer exchanged and plastics complex wound closure  # PMR on chronic steroids  # right knee eschar, right thigh necrosis and abd wound   #Multiple skin ulcers  # Anemia chronic disease    Plan:  wound care and wound vac per plastics and ortho  s/p multiple debridements  multiple ulcerated wounds: Derm c/s appreciated-f/u punch bx Cx and path  remains afebrile, cont suppressive minocycline, f/u ID  appreciate heme recs, started on procrit for anemia of chronic disease  cont coumadin and asa  per plastics and considering OR debridement with anesthesia for abdominal wound - pre-op eval noted in 5/22 progress note  appreciate pain management recs  PT OOB no knee motion, immobilizer if out of bed  bowel regimen

## 2018-05-25 NOTE — PROGRESS NOTE ADULT - SUBJECTIVE AND OBJECTIVE BOX
PLASTIC SURGERY    S: Pt seen and examined. Doing well. No acute events o/n.     O:  Vital Signs Last 24 Hrs  T(C): 36.5 (25 May 2018 07:01), Max: 36.8 (24 May 2018 15:15)  T(F): 97.7 (25 May 2018 07:01), Max: 98.2 (24 May 2018 15:15)  HR: 62 (25 May 2018 07:01) (62 - 82)  BP: 122/72 (25 May 2018 07:01) (106/64 - 122/72)  BP(mean): --  RR: 18 (25 May 2018 07:01) (18 - 20)  SpO2: 96% (25 May 2018 07:01) (95% - 96%)    I&O's Detail    24 May 2018 07:01  -  25 May 2018 07:00  --------------------------------------------------------  IN:    Oral Fluid: 480 mL  Total IN: 480 mL    OUT:    Voided: 1100 mL  Total OUT: 1100 mL    Total NET: -620 mL    MEDICATIONS  (STANDING):  ascorbic acid 500 milliGRAM(s) Oral daily  aspirin enteric coated 81 milliGRAM(s) Oral daily  buDESOnide 160 MICROgram(s)/formoterol 4.5 MICROgram(s) Inhaler 2 Puff(s) Inhalation two times a day  clotrimazole/betamethasone Cream 1 Application(s) Topical two times a day  docusate sodium 100 milliGRAM(s) Oral three times a day  epoetin jenni Injectable 62649 Unit(s) SubCutaneous every 7 days  ferrous    sulfate 325 milliGRAM(s) Oral daily  folic acid 1 milliGRAM(s) Oral daily  gabapentin 200 milliGRAM(s) Oral three times a day  melatonin 3 milliGRAM(s) Oral at bedtime  minocycline 100 milliGRAM(s) Oral two times a day  multivitamin 1 Tablet(s) Oral daily  mupirocin 2% Ointment 1 Application(s) Topical two times a day  nystatin    Suspension 840387 Unit(s) Oral every 6 hours  pantoprazole    Tablet 40 milliGRAM(s) Oral before breakfast  polyethylene glycol 3350 17 Gram(s) Oral daily  povidone iodine 10% Solution 1 Application(s) Topical every 12 hours  tiotropium 18 MICROgram(s) Capsule 1 Capsule(s) Inhalation daily  zinc oxide 20% Ointment 1 Application(s) Topical daily    MEDICATIONS  (PRN):  acetaminophen   Tablet 650 milliGRAM(s) Oral every 6 hours PRN For Temp greater than 38 C (100.4 F)  acetaminophen   Tablet. 650 milliGRAM(s) Oral every 6 hours PRN Mild Pain (1 - 3)  bisacodyl Suppository 10 milliGRAM(s) Rectal daily PRN Constipation  HYDROmorphone   Tablet 4 milliGRAM(s) Oral every 4 hours PRN Severe Pain (7 - 10)  HYDROmorphone   Tablet 2 milliGRAM(s) Oral every 4 hours PRN Moderate Pain (4 - 6)  HYDROmorphone  Injectable 0.5 milliGRAM(s) IV Push daily PRN Dressing change  lidocaine 2% Gel 1 Application(s) Topical daily PRN dressing change  lidocaine 2% Viscous 10 milliLiter(s) Swish and Spit every 6 hours PRN Mouth Sores  senna 2 Tablet(s) Oral at bedtime PRN Constipation    Exam:  RLE knee wound stable, fibrinous exudate at base. Eschar   Proximal RLE and abdominal wound vacs in place.    LABS:                      7.9    10.96 )-----------( 314      ( 24 May 2018 07:51 )             25.8   05-24    138  |  100  |  16  ----------------------------<  99  4.9   |  30  |  0.66    Ca    9.4      24 May 2018 06:07

## 2018-05-25 NOTE — PROGRESS NOTE ADULT - SUBJECTIVE AND OBJECTIVE BOX
CC: f/u for septic prosthetic right knee on chronic suppression minocycline     Patient reports feeling OK she is in bed     REVIEW OF SYSTEMS:  All other review of systems negative (Comprehensive ROS)    Antimicrobials Day #  :  minocycline 100 milliGRAM(s) Oral two times a day  nystatin    Suspension 580474 Unit(s) Oral every 6 hours    Vital Signs Last 24 Hrs  T(C): 36.3 (25 May 2018 10:46), Max: 36.8 (24 May 2018 15:15)  T(F): 97.4 (25 May 2018 10:46), Max: 98.2 (24 May 2018 15:15)  HR: 68 (25 May 2018 10:46) (62 - 82)  BP: 124/74 (25 May 2018 10:46) (106/64 - 124/74)  BP(mean): --  RR: 18 (25 May 2018 10:46) (18 - 20)  SpO2: 98% (25 May 2018 10:46) (95% - 98%)  PHYSICAL EXAM:  General: alert, no acute distress  Eyes:  anicteric, no conjunctival injection, no discharge  Oropharynx: no lesions or injection 	  Neck: supple, without adenopathy  Lungs: clear to auscultation  Heart: s1s2 2/6 sys m  Abdomen: soft, nondistended, nontender, without mass or organomegaly. vac over wound  Skin: no lesions  Extremities: vac over right thigh wound and eschar over knee. vac on her lower abdomen   Neurologic: alert, oriented, moves all extremities    LAB RESULTS:                        7.9    10.28 )-----------( 292      ( 25 May 2018 09:15 )             26.9                  05-25    137  |  98  |  18  ----------------------------<  111<H>  5.0   |  30  |  0.74    Ca    9.3      25 May 2018 07:15          Culture Results:   Moderate Enterobacter cloacae/asburiae  Moderate Morganella morganii (05.23.18 @ 18:05)    < from: Xray Knee 3 Views, Right (05.16.18 @ 22:44) >    IMPRESSION:     As seen previously the patient is status post explantation of a right   total knee arthroplasty with intramedullary ruthann bridging the  knee articulation with cement on both sides of the joint surrounding the  ruthann. The appearance is grossly unchanged from the prior exam.       < end of copied text >

## 2018-05-25 NOTE — PROGRESS NOTE ADULT - ASSESSMENT
no plan for orthopedic surgical intervention per family and patient preference  local wound care to knee and vac therapy to abd/thigh per plastics and wound care teams  PO abx per ID team  encourage patient to spend majority of bed oob in bedside chair as able  PT to help mobilize, she must remain 50% wb on the RLE and NO KNEE MOTION allowed, knee immobilizer if oob  coumadin, inr goal 2-3, currently sub-therapeutic  continue to optimize nutrition  continue to involve psych	  dispo planning

## 2018-05-25 NOTE — PROGRESS NOTE ADULT - ASSESSMENT
77 year old female  s/p rudy from right knee and spacer for strept infection then returned with nonhealing wound and had spacer change, fusion ruthann, plastic closure and grew mrsa. Wound developed necrotic skin and then pneumonia and hematoma over the right thigh with necrosis of skin too.   Now finished full course of iv abx and on suppressive minocycline for the knee. Pneumonia treated.  Has debrided thigh and abdomen wound with vac. No uncontrolled infection at present  Reviewed notes dermatology performed 4mm punch bx performed of R thigh.  Wound was closed with gel foam, no sutures placed done on 5/23, reviewed cx that were obtained: grew enterobacter and morganella, in my opinion cx findings are suggestive of colonized roopa in a patient with prolonged hospital stay, I examined site right thigh no signs of cellulitis, therefore no additional abx are indicated at this time     Plan:   continue suppressive minocycline as planned  Reviewed with medicine NP on floor, skin cx findings suggest colonization no need for additional abx, continue minocycline   local wound care per plastics and wound care team

## 2018-05-26 LAB
-  AMIKACIN: SIGNIFICANT CHANGE UP
-  AMOXICILLIN/CLAVULANIC ACID: SIGNIFICANT CHANGE UP
-  AMPICILLIN/SULBACTAM: SIGNIFICANT CHANGE UP
-  AMPICILLIN: SIGNIFICANT CHANGE UP
-  AMPICILLIN: SIGNIFICANT CHANGE UP
-  AZTREONAM: SIGNIFICANT CHANGE UP
-  CEFAZOLIN: SIGNIFICANT CHANGE UP
-  CEFEPIME: SIGNIFICANT CHANGE UP
-  CEFOXITIN: SIGNIFICANT CHANGE UP
-  CEFTRIAXONE: SIGNIFICANT CHANGE UP
-  CIPROFLOXACIN: SIGNIFICANT CHANGE UP
-  CIPROFLOXACIN: SIGNIFICANT CHANGE UP
-  ERTAPENEM: SIGNIFICANT CHANGE UP
-  ERYTHROMYCIN: SIGNIFICANT CHANGE UP
-  GENTAMICIN: SIGNIFICANT CHANGE UP
-  IMIPENEM: SIGNIFICANT CHANGE UP
-  LEVOFLOXACIN: SIGNIFICANT CHANGE UP
-  MEROPENEM: SIGNIFICANT CHANGE UP
-  PIPERACILLIN/TAZOBACTAM: SIGNIFICANT CHANGE UP
-  TETRACYCLINE: SIGNIFICANT CHANGE UP
-  TOBRAMYCIN: SIGNIFICANT CHANGE UP
-  TRIMETHOPRIM/SULFAMETHOXAZOLE: SIGNIFICANT CHANGE UP
-  VANCOMYCIN: SIGNIFICANT CHANGE UP
ANION GAP SERPL CALC-SCNC: 9 MMOL/L — SIGNIFICANT CHANGE UP (ref 5–17)
BUN SERPL-MCNC: 26 MG/DL — HIGH (ref 7–23)
CALCIUM SERPL-MCNC: 9.4 MG/DL — SIGNIFICANT CHANGE UP (ref 8.4–10.5)
CHLORIDE SERPL-SCNC: 99 MMOL/L — SIGNIFICANT CHANGE UP (ref 96–108)
CO2 SERPL-SCNC: 30 MMOL/L — SIGNIFICANT CHANGE UP (ref 22–31)
CREAT SERPL-MCNC: 0.74 MG/DL — SIGNIFICANT CHANGE UP (ref 0.5–1.3)
GLUCOSE SERPL-MCNC: 107 MG/DL — HIGH (ref 70–99)
HCT VFR BLD CALC: 28.5 % — LOW (ref 34.5–45)
HGB BLD-MCNC: 8.5 G/DL — LOW (ref 11.5–15.5)
INR BLD: 1.44 RATIO — HIGH (ref 0.88–1.16)
MCHC RBC-ENTMCNC: 27.3 PG — SIGNIFICANT CHANGE UP (ref 27–34)
MCHC RBC-ENTMCNC: 29.8 GM/DL — LOW (ref 32–36)
MCV RBC AUTO: 91.6 FL — SIGNIFICANT CHANGE UP (ref 80–100)
METHOD TYPE: SIGNIFICANT CHANGE UP
METHOD TYPE: SIGNIFICANT CHANGE UP
PLATELET # BLD AUTO: 337 K/UL — SIGNIFICANT CHANGE UP (ref 150–400)
POTASSIUM SERPL-MCNC: 4.9 MMOL/L — SIGNIFICANT CHANGE UP (ref 3.5–5.3)
POTASSIUM SERPL-SCNC: 4.9 MMOL/L — SIGNIFICANT CHANGE UP (ref 3.5–5.3)
PROTHROM AB SERPL-ACNC: 16.4 SEC — HIGH (ref 10–13.1)
RBC # BLD: 3.11 M/UL — LOW (ref 3.8–5.2)
RBC # FLD: 19.6 % — HIGH (ref 10.3–14.5)
SODIUM SERPL-SCNC: 138 MMOL/L — SIGNIFICANT CHANGE UP (ref 135–145)
WBC # BLD: 13.56 K/UL — HIGH (ref 3.8–10.5)
WBC # FLD AUTO: 13.56 K/UL — HIGH (ref 3.8–10.5)

## 2018-05-26 RX ORDER — WARFARIN SODIUM 2.5 MG/1
1.5 TABLET ORAL ONCE
Qty: 0 | Refills: 0 | Status: COMPLETED | OUTPATIENT
Start: 2018-05-26 | End: 2018-05-26

## 2018-05-26 RX ADMIN — GABAPENTIN 200 MILLIGRAM(S): 400 CAPSULE ORAL at 05:12

## 2018-05-26 RX ADMIN — HYDROMORPHONE HYDROCHLORIDE 4 MILLIGRAM(S): 2 INJECTION INTRAMUSCULAR; INTRAVENOUS; SUBCUTANEOUS at 05:12

## 2018-05-26 RX ADMIN — Medication 500 MILLIGRAM(S): at 14:23

## 2018-05-26 RX ADMIN — Medication 500000 UNIT(S): at 14:25

## 2018-05-26 RX ADMIN — Medication 500000 UNIT(S): at 05:17

## 2018-05-26 RX ADMIN — CLOTRIMAZOLE AND BETAMETHASONE DIPROPIONATE 1 APPLICATION(S): 10; .5 CREAM TOPICAL at 17:14

## 2018-05-26 RX ADMIN — HYDROMORPHONE HYDROCHLORIDE 0.5 MILLIGRAM(S): 2 INJECTION INTRAMUSCULAR; INTRAVENOUS; SUBCUTANEOUS at 17:00

## 2018-05-26 RX ADMIN — GABAPENTIN 200 MILLIGRAM(S): 400 CAPSULE ORAL at 22:53

## 2018-05-26 RX ADMIN — Medication 500000 UNIT(S): at 23:00

## 2018-05-26 RX ADMIN — MUPIROCIN 1 APPLICATION(S): 20 OINTMENT TOPICAL at 05:17

## 2018-05-26 RX ADMIN — HYDROMORPHONE HYDROCHLORIDE 0.5 MILLIGRAM(S): 2 INJECTION INTRAMUSCULAR; INTRAVENOUS; SUBCUTANEOUS at 16:41

## 2018-05-26 RX ADMIN — Medication 500000 UNIT(S): at 00:31

## 2018-05-26 RX ADMIN — Medication 1 MILLIGRAM(S): at 14:24

## 2018-05-26 RX ADMIN — MINOCYCLINE HYDROCHLORIDE 100 MILLIGRAM(S): 45 TABLET, EXTENDED RELEASE ORAL at 17:12

## 2018-05-26 RX ADMIN — PANTOPRAZOLE SODIUM 40 MILLIGRAM(S): 20 TABLET, DELAYED RELEASE ORAL at 05:12

## 2018-05-26 RX ADMIN — HYDROMORPHONE HYDROCHLORIDE 4 MILLIGRAM(S): 2 INJECTION INTRAMUSCULAR; INTRAVENOUS; SUBCUTANEOUS at 05:42

## 2018-05-26 RX ADMIN — BUDESONIDE AND FORMOTEROL FUMARATE DIHYDRATE 2 PUFF(S): 160; 4.5 AEROSOL RESPIRATORY (INHALATION) at 05:18

## 2018-05-26 RX ADMIN — Medication 81 MILLIGRAM(S): at 14:23

## 2018-05-26 RX ADMIN — MINOCYCLINE HYDROCHLORIDE 100 MILLIGRAM(S): 45 TABLET, EXTENDED RELEASE ORAL at 05:12

## 2018-05-26 RX ADMIN — Medication 1 APPLICATION(S): at 17:15

## 2018-05-26 RX ADMIN — TIOTROPIUM BROMIDE 1 CAPSULE(S): 18 CAPSULE ORAL; RESPIRATORY (INHALATION) at 14:27

## 2018-05-26 RX ADMIN — Medication 1 APPLICATION(S): at 05:18

## 2018-05-26 RX ADMIN — Medication 500000 UNIT(S): at 17:12

## 2018-05-26 RX ADMIN — HYDROMORPHONE HYDROCHLORIDE 4 MILLIGRAM(S): 2 INJECTION INTRAMUSCULAR; INTRAVENOUS; SUBCUTANEOUS at 14:58

## 2018-05-26 RX ADMIN — Medication 3 MILLIGRAM(S): at 22:53

## 2018-05-26 RX ADMIN — WARFARIN SODIUM 1.5 MILLIGRAM(S): 2.5 TABLET ORAL at 22:53

## 2018-05-26 RX ADMIN — MUPIROCIN 1 APPLICATION(S): 20 OINTMENT TOPICAL at 17:13

## 2018-05-26 RX ADMIN — Medication 325 MILLIGRAM(S): at 14:24

## 2018-05-26 RX ADMIN — ZINC OXIDE 1 APPLICATION(S): 200 OINTMENT TOPICAL at 14:28

## 2018-05-26 RX ADMIN — HYDROMORPHONE HYDROCHLORIDE 4 MILLIGRAM(S): 2 INJECTION INTRAMUSCULAR; INTRAVENOUS; SUBCUTANEOUS at 15:30

## 2018-05-26 RX ADMIN — CLOTRIMAZOLE AND BETAMETHASONE DIPROPIONATE 1 APPLICATION(S): 10; .5 CREAM TOPICAL at 05:17

## 2018-05-26 RX ADMIN — GABAPENTIN 200 MILLIGRAM(S): 400 CAPSULE ORAL at 14:27

## 2018-05-26 RX ADMIN — BUDESONIDE AND FORMOTEROL FUMARATE DIHYDRATE 2 PUFF(S): 160; 4.5 AEROSOL RESPIRATORY (INHALATION) at 17:18

## 2018-05-26 RX ADMIN — Medication 1 TABLET(S): at 14:25

## 2018-05-26 NOTE — PROGRESS NOTE ADULT - ASSESSMENT
A: 77F with complex PMH/PSH who recently had placement of a right knee antibiotic spacer followed by PRS closure c/b skin necrosis and formation of an eschar over the anterior knee joint. The patient was readmitted with fevers and right thigh cellulitis which was complicated by skin breakdown. Patient also developed wound at the right/mid lower quadrant of the abdomen.    P:  >> Dressing change TID to knee  >> Betadine to eschar bid  >> VACs on abdomen and R thigh to be changed Monday  >> No active Rheum. issues- continue to hold steroids  >> Derm- ulcers, not pyoderma gangrenosum  >> Medical optimization as per primary team.  >> DVT prophylaxis.  >> Protein supplementation of diet to encourage wound healing.     [facility] Plastic Surgery Pager -- [phone]  [facility] Plastic Surgery Pager -- [phone]

## 2018-05-26 NOTE — PROGRESS NOTE ADULT - SUBJECTIVE AND OBJECTIVE BOX
Patient is a 77y old  Female who presents with a chief complaint of fever (11 Apr 2018 14:25)      INTERVAL HPI/OVERNIGHT EVENTS: noted, feels well,    Vital Signs Last 24 Hrs  T(C): 36.9 (26 May 2018 12:32), Max: 37.1 (25 May 2018 20:10)  T(F): 98.5 (26 May 2018 12:32), Max: 98.8 (25 May 2018 20:10)  HR: 60 (26 May 2018 12:32) (60 - 85)  BP: 109/53 (26 May 2018 12:32) (109/53 - 145/65)  BP(mean): --  RR: 18 (26 May 2018 12:32) (18 - 18)  SpO2: 96% (26 May 2018 12:32) (95% - 97%)    acetaminophen   Tablet 650 milliGRAM(s) Oral every 6 hours PRN  acetaminophen   Tablet. 650 milliGRAM(s) Oral every 6 hours PRN  ascorbic acid 500 milliGRAM(s) Oral daily  aspirin enteric coated 81 milliGRAM(s) Oral daily  bisacodyl Suppository 10 milliGRAM(s) Rectal daily PRN  buDESOnide 160 MICROgram(s)/formoterol 4.5 MICROgram(s) Inhaler 2 Puff(s) Inhalation two times a day  clotrimazole/betamethasone Cream 1 Application(s) Topical two times a day  docusate sodium 100 milliGRAM(s) Oral three times a day  epoetin jenni Injectable 31370 Unit(s) SubCutaneous every 7 days  ferrous    sulfate 325 milliGRAM(s) Oral daily  folic acid 1 milliGRAM(s) Oral daily  gabapentin 200 milliGRAM(s) Oral three times a day  HYDROmorphone   Tablet 4 milliGRAM(s) Oral every 4 hours PRN  HYDROmorphone   Tablet 2 milliGRAM(s) Oral every 4 hours PRN  HYDROmorphone  Injectable 0.5 milliGRAM(s) IV Push daily PRN  lidocaine 2% Gel 1 Application(s) Topical daily PRN  lidocaine 2% Viscous 10 milliLiter(s) Swish and Spit every 6 hours PRN  melatonin 3 milliGRAM(s) Oral at bedtime  minocycline 100 milliGRAM(s) Oral two times a day  multivitamin 1 Tablet(s) Oral daily  mupirocin 2% Ointment 1 Application(s) Topical two times a day  nystatin    Suspension 014895 Unit(s) Oral every 6 hours  pantoprazole    Tablet 40 milliGRAM(s) Oral before breakfast  polyethylene glycol 3350 17 Gram(s) Oral daily  povidone iodine 10% Solution 1 Application(s) Topical every 12 hours  senna 2 Tablet(s) Oral at bedtime PRN  tiotropium 18 MICROgram(s) Capsule 1 Capsule(s) Inhalation daily  zinc oxide 20% Ointment 1 Application(s) Topical daily      PHYSICAL EXAM:  GENERAL: NAD,   EYES: conjunctiva and sclera clear  ENMT: Moist mucous membranes  NECK: Supple, No JVD, Normal thyroid  NERVOUS SYSTEM:  Alert & Oriented X3,   CHEST/LUNG: Clear to auscultation bilaterally; No rales, rhonchi, wheezing, or rubs  HEART: Regular rate and rhythm; No murmurs, rubs, or gallops  ABDOMEN: Soft, Nontender, Nondistended; Bowel sounds present  EXTREMITIES:  wound vacs, and dressings  LYMPH: No lymphadenopathy noted  SKIN: No rashes or lesions    Consultant(s) Notes Reviewed:  [x ] YES  [ ] NO  Care Discussed with Consultants/Other Providers [ x] YES  [ ] NO    LABS:                        8.5    13.56 )-----------( 337      ( 26 May 2018 07:59 )             28.5     05-26    138  |  99  |  26<H>  ----------------------------<  107<H>  4.9   |  30  |  0.74    Ca    9.4      26 May 2018 06:47      PT/INR - ( 26 May 2018 07:59 )   PT: 16.4 sec;   INR: 1.44 ratio             CAPILLARY BLOOD GLUCOSE                RADIOLOGY & ADDITIONAL TESTS:    Imaging Personally Reviewed:  [ x] YES  [ ] NO

## 2018-05-26 NOTE — PROGRESS NOTE ADULT - SUBJECTIVE AND OBJECTIVE BOX
PLASTIC SURGERY    S: Pt seen and examined. Doing well. No acute events o/n.     O:  Vital Signs Last 24 Hrs  T(C): 36.4 (26 May 2018 04:45), Max: 37.1 (25 May 2018 20:10)  T(F): 97.5 (26 May 2018 04:45), Max: 98.8 (25 May 2018 20:10)  HR: 85 (26 May 2018 04:45) (68 - 85)  BP: 145/65 (26 May 2018 04:45) (109/57 - 145/65)  BP(mean): --  RR: 18 (26 May 2018 04:45) (18 - 18)  SpO2: 95% (26 May 2018 04:45) (95% - 98%)    I&O's Detail    25 May 2018 07:01  -  26 May 2018 07:00  --------------------------------------------------------  IN:    Oral Fluid: 720 mL  Total IN: 720 mL    OUT:    Voided: 400 mL  Total OUT: 400 mL    Total NET: 320 mL    MEDICATIONS  (STANDING):  ascorbic acid 500 milliGRAM(s) Oral daily  aspirin enteric coated 81 milliGRAM(s) Oral daily  buDESOnide 160 MICROgram(s)/formoterol 4.5 MICROgram(s) Inhaler 2 Puff(s) Inhalation two times a day  clotrimazole/betamethasone Cream 1 Application(s) Topical two times a day  docusate sodium 100 milliGRAM(s) Oral three times a day  epoetin jenni Injectable 00194 Unit(s) SubCutaneous every 7 days  ferrous    sulfate 325 milliGRAM(s) Oral daily  folic acid 1 milliGRAM(s) Oral daily  gabapentin 200 milliGRAM(s) Oral three times a day  melatonin 3 milliGRAM(s) Oral at bedtime  minocycline 100 milliGRAM(s) Oral two times a day  multivitamin 1 Tablet(s) Oral daily  mupirocin 2% Ointment 1 Application(s) Topical two times a day  nystatin    Suspension 143593 Unit(s) Oral every 6 hours  pantoprazole    Tablet 40 milliGRAM(s) Oral before breakfast  polyethylene glycol 3350 17 Gram(s) Oral daily  povidone iodine 10% Solution 1 Application(s) Topical every 12 hours  tiotropium 18 MICROgram(s) Capsule 1 Capsule(s) Inhalation daily  zinc oxide 20% Ointment 1 Application(s) Topical daily    MEDICATIONS  (PRN):  acetaminophen   Tablet 650 milliGRAM(s) Oral every 6 hours PRN For Temp greater than 38 C (100.4 F)  acetaminophen   Tablet. 650 milliGRAM(s) Oral every 6 hours PRN Mild Pain (1 - 3)  bisacodyl Suppository 10 milliGRAM(s) Rectal daily PRN Constipation  HYDROmorphone   Tablet 4 milliGRAM(s) Oral every 4 hours PRN Severe Pain (7 - 10)  HYDROmorphone   Tablet 2 milliGRAM(s) Oral every 4 hours PRN Moderate Pain (4 - 6)  HYDROmorphone  Injectable 0.5 milliGRAM(s) IV Push daily PRN Dressing change  lidocaine 2% Gel 1 Application(s) Topical daily PRN dressing change  lidocaine 2% Viscous 10 milliLiter(s) Swish and Spit every 6 hours PRN Mouth Sores  senna 2 Tablet(s) Oral at bedtime PRN Constipation    Exam:  RLE knee wound stable, fibrinous exudate at base. Eschar , Wet-to-dry dressing placed.  Proximal RLE and abdominal wound vacs in place.    LABS:                             8.5    13.56 )-----------( 337      ( 26 May 2018 07:59 )             28.5   05-26    138  |  99  |  26<H>  ----------------------------<  107<H>  4.9   |  30  |  0.74    Ca    9.4      26 May 2018 06:47

## 2018-05-26 NOTE — PROGRESS NOTE ADULT - ASSESSMENT
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     # Recent septic Rt TKR - abx spacer exchanged and plastics complex wound closure  # PMR on chronic steroids  # right knee eschar, right thigh necrosis and abd wound   #Multiple skin ulcers  # Anemia chronic disease    Plan:  wound care and wound vac per plastics and ortho  s/p multiple debridements  multiple ulcerated wounds: Derm c/s appreciated-f/u punch bx Cx-ngtd and f/u path  remains afebrile, cont suppressive minocycline,  ID f/u noted  appreciate heme recs, started on procrit for anemia of chronic disease  cont coumadin and asa  per plastics and considering OR debridement with anesthesia for abdominal wound - pre-op eval noted in 5/22 progress note  appreciate pain management recs  PT OOB no knee motion, immobilizer if out of bed  bowel regimen

## 2018-05-27 LAB
ANION GAP SERPL CALC-SCNC: 11 MMOL/L — SIGNIFICANT CHANGE UP (ref 5–17)
BUN SERPL-MCNC: 25 MG/DL — HIGH (ref 7–23)
CALCIUM SERPL-MCNC: 9.9 MG/DL — SIGNIFICANT CHANGE UP (ref 8.4–10.5)
CHLORIDE SERPL-SCNC: 99 MMOL/L — SIGNIFICANT CHANGE UP (ref 96–108)
CO2 SERPL-SCNC: 28 MMOL/L — SIGNIFICANT CHANGE UP (ref 22–31)
CREAT SERPL-MCNC: 0.75 MG/DL — SIGNIFICANT CHANGE UP (ref 0.5–1.3)
GLUCOSE SERPL-MCNC: 103 MG/DL — HIGH (ref 70–99)
HCT VFR BLD CALC: 29.8 % — LOW (ref 34.5–45)
HGB BLD-MCNC: 9 G/DL — LOW (ref 11.5–15.5)
INR BLD: 1.42 RATIO — HIGH (ref 0.88–1.16)
MCHC RBC-ENTMCNC: 27.4 PG — SIGNIFICANT CHANGE UP (ref 27–34)
MCHC RBC-ENTMCNC: 30.2 GM/DL — LOW (ref 32–36)
MCV RBC AUTO: 90.9 FL — SIGNIFICANT CHANGE UP (ref 80–100)
PLATELET # BLD AUTO: 331 K/UL — SIGNIFICANT CHANGE UP (ref 150–400)
POTASSIUM SERPL-MCNC: 5.1 MMOL/L — SIGNIFICANT CHANGE UP (ref 3.5–5.3)
POTASSIUM SERPL-SCNC: 5.1 MMOL/L — SIGNIFICANT CHANGE UP (ref 3.5–5.3)
PROTHROM AB SERPL-ACNC: 16.2 SEC — HIGH (ref 10–13.1)
RBC # BLD: 3.28 M/UL — LOW (ref 3.8–5.2)
RBC # FLD: 19.5 % — HIGH (ref 10.3–14.5)
SODIUM SERPL-SCNC: 138 MMOL/L — SIGNIFICANT CHANGE UP (ref 135–145)
WBC # BLD: 15.58 K/UL — HIGH (ref 3.8–10.5)
WBC # FLD AUTO: 15.58 K/UL — HIGH (ref 3.8–10.5)

## 2018-05-27 RX ORDER — WARFARIN SODIUM 2.5 MG/1
5 TABLET ORAL ONCE
Qty: 0 | Refills: 0 | Status: COMPLETED | OUTPATIENT
Start: 2018-05-27 | End: 2018-05-27

## 2018-05-27 RX ADMIN — TIOTROPIUM BROMIDE 1 CAPSULE(S): 18 CAPSULE ORAL; RESPIRATORY (INHALATION) at 11:33

## 2018-05-27 RX ADMIN — ZINC OXIDE 1 APPLICATION(S): 200 OINTMENT TOPICAL at 11:33

## 2018-05-27 RX ADMIN — CLOTRIMAZOLE AND BETAMETHASONE DIPROPIONATE 1 APPLICATION(S): 10; .5 CREAM TOPICAL at 17:31

## 2018-05-27 RX ADMIN — Medication 81 MILLIGRAM(S): at 11:31

## 2018-05-27 RX ADMIN — GABAPENTIN 200 MILLIGRAM(S): 400 CAPSULE ORAL at 21:18

## 2018-05-27 RX ADMIN — Medication 500000 UNIT(S): at 17:28

## 2018-05-27 RX ADMIN — MUPIROCIN 1 APPLICATION(S): 20 OINTMENT TOPICAL at 07:10

## 2018-05-27 RX ADMIN — HYDROMORPHONE HYDROCHLORIDE 2 MILLIGRAM(S): 2 INJECTION INTRAMUSCULAR; INTRAVENOUS; SUBCUTANEOUS at 17:27

## 2018-05-27 RX ADMIN — Medication 3 MILLIGRAM(S): at 21:17

## 2018-05-27 RX ADMIN — ERYTHROPOIETIN 20000 UNIT(S): 10000 INJECTION, SOLUTION INTRAVENOUS; SUBCUTANEOUS at 15:07

## 2018-05-27 RX ADMIN — MINOCYCLINE HYDROCHLORIDE 100 MILLIGRAM(S): 45 TABLET, EXTENDED RELEASE ORAL at 07:08

## 2018-05-27 RX ADMIN — Medication 500000 UNIT(S): at 11:38

## 2018-05-27 RX ADMIN — Medication 1 MILLIGRAM(S): at 11:31

## 2018-05-27 RX ADMIN — MUPIROCIN 1 APPLICATION(S): 20 OINTMENT TOPICAL at 17:30

## 2018-05-27 RX ADMIN — BUDESONIDE AND FORMOTEROL FUMARATE DIHYDRATE 2 PUFF(S): 160; 4.5 AEROSOL RESPIRATORY (INHALATION) at 17:33

## 2018-05-27 RX ADMIN — HYDROMORPHONE HYDROCHLORIDE 0.5 MILLIGRAM(S): 2 INJECTION INTRAMUSCULAR; INTRAVENOUS; SUBCUTANEOUS at 12:00

## 2018-05-27 RX ADMIN — Medication 500000 UNIT(S): at 07:11

## 2018-05-27 RX ADMIN — CLOTRIMAZOLE AND BETAMETHASONE DIPROPIONATE 1 APPLICATION(S): 10; .5 CREAM TOPICAL at 07:10

## 2018-05-27 RX ADMIN — Medication 1 APPLICATION(S): at 07:11

## 2018-05-27 RX ADMIN — HYDROMORPHONE HYDROCHLORIDE 4 MILLIGRAM(S): 2 INJECTION INTRAMUSCULAR; INTRAVENOUS; SUBCUTANEOUS at 07:07

## 2018-05-27 RX ADMIN — HYDROMORPHONE HYDROCHLORIDE 0.5 MILLIGRAM(S): 2 INJECTION INTRAMUSCULAR; INTRAVENOUS; SUBCUTANEOUS at 11:20

## 2018-05-27 RX ADMIN — Medication 1 TABLET(S): at 11:31

## 2018-05-27 RX ADMIN — Medication 325 MILLIGRAM(S): at 11:31

## 2018-05-27 RX ADMIN — Medication 1 APPLICATION(S): at 17:32

## 2018-05-27 RX ADMIN — GABAPENTIN 200 MILLIGRAM(S): 400 CAPSULE ORAL at 07:10

## 2018-05-27 RX ADMIN — MINOCYCLINE HYDROCHLORIDE 100 MILLIGRAM(S): 45 TABLET, EXTENDED RELEASE ORAL at 17:28

## 2018-05-27 RX ADMIN — Medication 100 MILLIGRAM(S): at 21:20

## 2018-05-27 RX ADMIN — BUDESONIDE AND FORMOTEROL FUMARATE DIHYDRATE 2 PUFF(S): 160; 4.5 AEROSOL RESPIRATORY (INHALATION) at 07:09

## 2018-05-27 RX ADMIN — GABAPENTIN 200 MILLIGRAM(S): 400 CAPSULE ORAL at 15:08

## 2018-05-27 RX ADMIN — HYDROMORPHONE HYDROCHLORIDE 2 MILLIGRAM(S): 2 INJECTION INTRAMUSCULAR; INTRAVENOUS; SUBCUTANEOUS at 18:00

## 2018-05-27 RX ADMIN — PANTOPRAZOLE SODIUM 40 MILLIGRAM(S): 20 TABLET, DELAYED RELEASE ORAL at 07:08

## 2018-05-27 RX ADMIN — WARFARIN SODIUM 5 MILLIGRAM(S): 2.5 TABLET ORAL at 21:18

## 2018-05-27 RX ADMIN — Medication 500 MILLIGRAM(S): at 11:31

## 2018-05-27 NOTE — PROGRESS NOTE ADULT - ASSESSMENT
no plan for orthopedic surgical intervention per family and patient preference  local wound care to knee and vac therapy to abd/thigh per plastics and wound care teams  PO abx per ID team  encourage patient to spend majority of bed oob in bedside chair as able  PT to help mobilize, she must remain 50% wb on the RLE and NO KNEE MOTION allowed, knee immobilizer if oob  coumadin, inr goal 2-3, currently sub-therapeutic  continue to optimize nutrition  continue to involve psych	  dispo planning	    *recommend discussion w/ID re culture results showing efacealis, morganella morganii, enterobacter cloacae/absuria. Previously thought to be 2/2 colonization from prolonged hospital stay. patient afebrile but wbc now 15. possibly 2/2 epo? recommend reconsult ID and heme for opinions

## 2018-05-27 NOTE — PROGRESS NOTE ADULT - ASSESSMENT
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     # Recent septic Rt TKR - abx spacer exchanged and plastics complex wound closure  # PMR on chronic steroids  # right knee eschar, right thigh necrosis and abd wound   #Multiple skin ulcers  # Anemia chronic disease    Plan:  wound care and wound vac per plastics and ortho  s/p multiple debridements  multiple ulcerated wounds: Derm c/s appreciated-f/u punch bx Cx-ngtd and f/u path  remains afebrile, cont suppressive minocycline,  ID f/u noted  appreciate heme recs, started on procrit for anemia of chronic disease  cont coumadin and asa  f/u plastics recs  appreciate pain management recs  PT OOB no knee motion, immobilizer if out of bed  bowel regimen

## 2018-05-27 NOTE — PROGRESS NOTE ADULT - SUBJECTIVE AND OBJECTIVE BOX
Patient is a 77y old  Female who presents with a chief complaint of fever (11 Apr 2018 14:25)      INTERVAL HPI/OVERNIGHT EVENTS: this feels ok, pain in the lower back this am      Vital Signs Last 24 Hrs  T(C): 36.9 (27 May 2018 07:01), Max: 37.3 (27 May 2018 00:33)  T(F): 98.5 (27 May 2018 07:01), Max: 99.1 (27 May 2018 00:33)  HR: 79 (27 May 2018 07:01) (79 - 90)  BP: 139/63 (27 May 2018 07:01) (110/73 - 141/67)  BP(mean): --  RR: 18 (27 May 2018 07:01) (18 - 18)  SpO2: 97% (27 May 2018 07:01) (95% - 97%)    acetaminophen   Tablet 650 milliGRAM(s) Oral every 6 hours PRN  acetaminophen   Tablet. 650 milliGRAM(s) Oral every 6 hours PRN  ascorbic acid 500 milliGRAM(s) Oral daily  aspirin enteric coated 81 milliGRAM(s) Oral daily  bisacodyl Suppository 10 milliGRAM(s) Rectal daily PRN  buDESOnide 160 MICROgram(s)/formoterol 4.5 MICROgram(s) Inhaler 2 Puff(s) Inhalation two times a day  clotrimazole/betamethasone Cream 1 Application(s) Topical two times a day  docusate sodium 100 milliGRAM(s) Oral three times a day  epoetin jenni Injectable 93472 Unit(s) SubCutaneous every 7 days  ferrous    sulfate 325 milliGRAM(s) Oral daily  folic acid 1 milliGRAM(s) Oral daily  gabapentin 200 milliGRAM(s) Oral three times a day  HYDROmorphone   Tablet 4 milliGRAM(s) Oral every 4 hours PRN  HYDROmorphone   Tablet 2 milliGRAM(s) Oral every 4 hours PRN  HYDROmorphone  Injectable 0.5 milliGRAM(s) IV Push daily PRN  lidocaine 2% Gel 1 Application(s) Topical daily PRN  lidocaine 2% Viscous 10 milliLiter(s) Swish and Spit every 6 hours PRN  melatonin 3 milliGRAM(s) Oral at bedtime  minocycline 100 milliGRAM(s) Oral two times a day  multivitamin 1 Tablet(s) Oral daily  mupirocin 2% Ointment 1 Application(s) Topical two times a day  nystatin    Suspension 579206 Unit(s) Oral every 6 hours  pantoprazole    Tablet 40 milliGRAM(s) Oral before breakfast  polyethylene glycol 3350 17 Gram(s) Oral daily  povidone iodine 10% Solution 1 Application(s) Topical every 12 hours  senna 2 Tablet(s) Oral at bedtime PRN  tiotropium 18 MICROgram(s) Capsule 1 Capsule(s) Inhalation daily  zinc oxide 20% Ointment 1 Application(s) Topical daily      PHYSICAL EXAM:  GENERAL: NAD,   EYES: conjunctiva and sclera clear  ENMT: Moist mucous membranes  NECK: Supple, No JVD, Normal thyroid  NERVOUS SYSTEM:  Alert & Oriented X3,   CHEST/LUNG: Clear to auscultation bilaterally; No rales, rhonchi, wheezing, or rubs  HEART: Regular rate and rhythm; No murmurs, rubs, or gallops  ABDOMEN: Soft, Nontender, Nondistended; Bowel sounds present, abd wound vac  EXTREMITIES:  thigh wound vac. knee dressing, lt thigh wounds dressing  LYMPH: No lymphadenopathy noted  SKIN: No rashes or lesions    Consultant(s) Notes Reviewed:  [x ] YES  [ ] NO  Care Discussed with Consultants/Other Providers [ x] YES  [ ] NO    LABS:                        9.0    15.58 )-----------( 331      ( 27 May 2018 08:58 )             29.8     05-27    138  |  99  |  25<H>  ----------------------------<  103<H>  5.1   |  28  |  0.75    Ca    9.9      27 May 2018 07:03      PT/INR - ( 27 May 2018 08:58 )   PT: 16.2 sec;   INR: 1.42 ratio             CAPILLARY BLOOD GLUCOSE                RADIOLOGY & ADDITIONAL TESTS:    Imaging Personally Reviewed:  [x ] YES  [ ] NO

## 2018-05-27 NOTE — PROGRESS NOTE ADULT - SUBJECTIVE AND OBJECTIVE BOX
IRASEMA KOHLER  [id number]    Subjective:  Patient is a 77y old  Female who presents with a chief complaint of fever (11 Apr 2018 14:25). no acute events overnight, pain controlled       Objective:  T(C): 36.9 (05-27-18 @ 07:01), Max: 37.3 (05-27-18 @ 00:33)  HR: 79 (05-27-18 @ 07:01) (60 - 90)  BP: 139/63 (05-27-18 @ 07:01) (109/53 - 141/67)  RR: 18 (05-27-18 @ 07:01) (18 - 18)  SpO2: 97% (05-27-18 @ 07:01) (95% - 97%)  Wt(kg): --   05-27    138  |  99  |  25<H>  ----------------------------<  103<H>  5.1   |  28  |  0.75    Ca    9.9      27 May 2018 07:03                          8.5    13.56 )-----------( 337      ( 26 May 2018 07:59 )             28.5       05-26 @ 07:01  -  05-27 @ 07:00  --------------------------------------------------------  IN: 690 mL / OUT: 825 mL / NET: -135 mL      PHYSICAL EXAM:    General: NAD, resting in bed  Abd: VAC in place holding suction  MSK: Thigh VAC in place holding suction, knee eschar stable, open wound clean      MEDICATIONS  (STANDING):  ascorbic acid 500 milliGRAM(s) Oral daily  aspirin enteric coated 81 milliGRAM(s) Oral daily  buDESOnide 160 MICROgram(s)/formoterol 4.5 MICROgram(s) Inhaler 2 Puff(s) Inhalation two times a day  clotrimazole/betamethasone Cream 1 Application(s) Topical two times a day  docusate sodium 100 milliGRAM(s) Oral three times a day  epoetin jenni Injectable 75611 Unit(s) SubCutaneous every 7 days  ferrous    sulfate 325 milliGRAM(s) Oral daily  folic acid 1 milliGRAM(s) Oral daily  gabapentin 200 milliGRAM(s) Oral three times a day  melatonin 3 milliGRAM(s) Oral at bedtime  minocycline 100 milliGRAM(s) Oral two times a day  multivitamin 1 Tablet(s) Oral daily  mupirocin 2% Ointment 1 Application(s) Topical two times a day  nystatin    Suspension 887403 Unit(s) Oral every 6 hours  pantoprazole    Tablet 40 milliGRAM(s) Oral before breakfast  polyethylene glycol 3350 17 Gram(s) Oral daily  povidone iodine 10% Solution 1 Application(s) Topical every 12 hours  tiotropium 18 MICROgram(s) Capsule 1 Capsule(s) Inhalation daily  zinc oxide 20% Ointment 1 Application(s) Topical daily    MEDICATIONS  (PRN):  acetaminophen   Tablet 650 milliGRAM(s) Oral every 6 hours PRN For Temp greater than 38 C (100.4 F)  acetaminophen   Tablet. 650 milliGRAM(s) Oral every 6 hours PRN Mild Pain (1 - 3)  bisacodyl Suppository 10 milliGRAM(s) Rectal daily PRN Constipation  HYDROmorphone   Tablet 4 milliGRAM(s) Oral every 4 hours PRN Severe Pain (7 - 10)  HYDROmorphone   Tablet 2 milliGRAM(s) Oral every 4 hours PRN Moderate Pain (4 - 6)  HYDROmorphone  Injectable 0.5 milliGRAM(s) IV Push daily PRN Dressing change  lidocaine 2% Gel 1 Application(s) Topical daily PRN dressing change  lidocaine 2% Viscous 10 milliLiter(s) Swish and Spit every 6 hours PRN Mouth Sores  senna 2 Tablet(s) Oral at bedtime PRN Constipation      Assessment/Plan:  Patient is a 77y old  Female who presents with a chief complaint of fever (11 Apr 2018 14:25), has multiple open wounds. Should continue VAC dressing to help granulation  - Continue VAC, dressing change tomorrow  - WTD dakins to knee wound TID

## 2018-05-28 LAB
ANION GAP SERPL CALC-SCNC: 13 MMOL/L — SIGNIFICANT CHANGE UP (ref 5–17)
BUN SERPL-MCNC: 31 MG/DL — HIGH (ref 7–23)
CALCIUM SERPL-MCNC: 10.1 MG/DL — SIGNIFICANT CHANGE UP (ref 8.4–10.5)
CHLORIDE SERPL-SCNC: 97 MMOL/L — SIGNIFICANT CHANGE UP (ref 96–108)
CO2 SERPL-SCNC: 27 MMOL/L — SIGNIFICANT CHANGE UP (ref 22–31)
CREAT SERPL-MCNC: 0.75 MG/DL — SIGNIFICANT CHANGE UP (ref 0.5–1.3)
CULTURE RESULTS: SIGNIFICANT CHANGE UP
GLUCOSE SERPL-MCNC: 124 MG/DL — HIGH (ref 70–99)
HCT VFR BLD CALC: 28.1 % — LOW (ref 34.5–45)
HGB BLD-MCNC: 8.7 G/DL — LOW (ref 11.5–15.5)
INR BLD: 1.63 RATIO — HIGH (ref 0.88–1.16)
MCHC RBC-ENTMCNC: 27.5 PG — SIGNIFICANT CHANGE UP (ref 27–34)
MCHC RBC-ENTMCNC: 31 GM/DL — LOW (ref 32–36)
MCV RBC AUTO: 88.9 FL — SIGNIFICANT CHANGE UP (ref 80–100)
ORGANISM # SPEC MICROSCOPIC CNT: SIGNIFICANT CHANGE UP
PLATELET # BLD AUTO: 307 K/UL — SIGNIFICANT CHANGE UP (ref 150–400)
POTASSIUM SERPL-MCNC: 4.6 MMOL/L — SIGNIFICANT CHANGE UP (ref 3.5–5.3)
POTASSIUM SERPL-SCNC: 4.6 MMOL/L — SIGNIFICANT CHANGE UP (ref 3.5–5.3)
PROTHROM AB SERPL-ACNC: 18.6 SEC — HIGH (ref 10–13.1)
RBC # BLD: 3.16 M/UL — LOW (ref 3.8–5.2)
RBC # FLD: 19.3 % — HIGH (ref 10.3–14.5)
SODIUM SERPL-SCNC: 137 MMOL/L — SIGNIFICANT CHANGE UP (ref 135–145)
SPECIMEN SOURCE: SIGNIFICANT CHANGE UP
WBC # BLD: 13.81 K/UL — HIGH (ref 3.8–10.5)
WBC # FLD AUTO: 13.81 K/UL — HIGH (ref 3.8–10.5)

## 2018-05-28 RX ORDER — WARFARIN SODIUM 2.5 MG/1
3 TABLET ORAL ONCE
Qty: 0 | Refills: 0 | Status: COMPLETED | OUTPATIENT
Start: 2018-05-28 | End: 2018-05-28

## 2018-05-28 RX ADMIN — Medication 500 MILLIGRAM(S): at 13:04

## 2018-05-28 RX ADMIN — Medication 500000 UNIT(S): at 13:04

## 2018-05-28 RX ADMIN — GABAPENTIN 200 MILLIGRAM(S): 400 CAPSULE ORAL at 21:17

## 2018-05-28 RX ADMIN — BUDESONIDE AND FORMOTEROL FUMARATE DIHYDRATE 2 PUFF(S): 160; 4.5 AEROSOL RESPIRATORY (INHALATION) at 06:37

## 2018-05-28 RX ADMIN — TIOTROPIUM BROMIDE 1 CAPSULE(S): 18 CAPSULE ORAL; RESPIRATORY (INHALATION) at 13:03

## 2018-05-28 RX ADMIN — MUPIROCIN 1 APPLICATION(S): 20 OINTMENT TOPICAL at 06:44

## 2018-05-28 RX ADMIN — HYDROMORPHONE HYDROCHLORIDE 0.5 MILLIGRAM(S): 2 INJECTION INTRAMUSCULAR; INTRAVENOUS; SUBCUTANEOUS at 10:13

## 2018-05-28 RX ADMIN — Medication 1 TABLET(S): at 13:04

## 2018-05-28 RX ADMIN — Medication 500000 UNIT(S): at 17:19

## 2018-05-28 RX ADMIN — Medication 81 MILLIGRAM(S): at 13:04

## 2018-05-28 RX ADMIN — PANTOPRAZOLE SODIUM 40 MILLIGRAM(S): 20 TABLET, DELAYED RELEASE ORAL at 06:32

## 2018-05-28 RX ADMIN — Medication 500000 UNIT(S): at 06:38

## 2018-05-28 RX ADMIN — Medication 100 MILLIGRAM(S): at 21:17

## 2018-05-28 RX ADMIN — CLOTRIMAZOLE AND BETAMETHASONE DIPROPIONATE 1 APPLICATION(S): 10; .5 CREAM TOPICAL at 17:19

## 2018-05-28 RX ADMIN — MINOCYCLINE HYDROCHLORIDE 100 MILLIGRAM(S): 45 TABLET, EXTENDED RELEASE ORAL at 06:34

## 2018-05-28 RX ADMIN — HYDROMORPHONE HYDROCHLORIDE 4 MILLIGRAM(S): 2 INJECTION INTRAMUSCULAR; INTRAVENOUS; SUBCUTANEOUS at 07:00

## 2018-05-28 RX ADMIN — Medication 1 MILLIGRAM(S): at 13:03

## 2018-05-28 RX ADMIN — ZINC OXIDE 1 APPLICATION(S): 200 OINTMENT TOPICAL at 17:21

## 2018-05-28 RX ADMIN — GABAPENTIN 200 MILLIGRAM(S): 400 CAPSULE ORAL at 13:04

## 2018-05-28 RX ADMIN — HYDROMORPHONE HYDROCHLORIDE 4 MILLIGRAM(S): 2 INJECTION INTRAMUSCULAR; INTRAVENOUS; SUBCUTANEOUS at 06:30

## 2018-05-28 RX ADMIN — Medication 1 APPLICATION(S): at 06:43

## 2018-05-28 RX ADMIN — CLOTRIMAZOLE AND BETAMETHASONE DIPROPIONATE 1 APPLICATION(S): 10; .5 CREAM TOPICAL at 06:42

## 2018-05-28 RX ADMIN — WARFARIN SODIUM 3 MILLIGRAM(S): 2.5 TABLET ORAL at 21:17

## 2018-05-28 RX ADMIN — MUPIROCIN 1 APPLICATION(S): 20 OINTMENT TOPICAL at 17:19

## 2018-05-28 RX ADMIN — Medication 1 APPLICATION(S): at 17:20

## 2018-05-28 RX ADMIN — GABAPENTIN 200 MILLIGRAM(S): 400 CAPSULE ORAL at 06:32

## 2018-05-28 RX ADMIN — BUDESONIDE AND FORMOTEROL FUMARATE DIHYDRATE 2 PUFF(S): 160; 4.5 AEROSOL RESPIRATORY (INHALATION) at 17:19

## 2018-05-28 RX ADMIN — Medication 3 MILLIGRAM(S): at 21:17

## 2018-05-28 RX ADMIN — Medication 100 MILLIGRAM(S): at 06:32

## 2018-05-28 RX ADMIN — HYDROMORPHONE HYDROCHLORIDE 0.5 MILLIGRAM(S): 2 INJECTION INTRAMUSCULAR; INTRAVENOUS; SUBCUTANEOUS at 10:43

## 2018-05-28 RX ADMIN — MINOCYCLINE HYDROCHLORIDE 100 MILLIGRAM(S): 45 TABLET, EXTENDED RELEASE ORAL at 17:19

## 2018-05-28 RX ADMIN — Medication 325 MILLIGRAM(S): at 13:03

## 2018-05-28 NOTE — PROGRESS NOTE ADULT - SUBJECTIVE AND OBJECTIVE BOX
CC: f/u for infected rt knee    Patient reports no knew complaints.Awaiting path on skin biopsy,? Pyoderma gangrenosum being excluded    REVIEW OF SYSTEMS:  All other review of systems negative (Comprehensive ROS)    Antimicrobials Day #  :longterma  minocycline 100 milliGRAM(s) Oral two times a day  nystatin    Suspension 621875 Unit(s) Oral every 6 hours    Other Medications Reviewed    T(F): 98.1 (05-28-18 @ 06:00), Max: 98.6 (05-27-18 @ 21:30)  HR: 104 (05-28-18 @ 06:00)  BP: 118/75 (05-28-18 @ 06:00)  RR: 18 (05-28-18 @ 06:00)  SpO2: 95% (05-28-18 @ 06:00)  Wt(kg): --    PHYSICAL EXAM:  General: alert, no acute distress  Eyes:  anicteric, no conjunctival injection, no discharge  Oropharynx: no lesions or injection 	  Neck: supple, without adenopathy  Lungs: clear to auscultation, diminished at bases  Heart: regular rate and rhythm; no murmur, rubs or gallops  Abdomen: soft, nondistended, nontender, without mass or organomegaly  Skin: no lesions  Extremities: no clubbing, cyanosis, + edema  Neurologic: alert, oriented, moves all extremities  RT knee wound with scab, lower abdominal and thigh with wound vac  LAB RESULTS:                        8.7    13.81 )-----------( 307      ( 28 May 2018 08:48 )             28.1     05-28    137  |  97  |  31<H>  ----------------------------<  124<H>  4.6   |  27  |  0.75    Ca    10.1      28 May 2018 07:17          MICROBIOLOGY:  RECENT CULTURES:  05-23 @ 18:05 .Tissue Other, right thigh Enterobacter cloacae/absuria  Morganella morganii  Enterococcus faecalis    Growth in fluid media only Enterococcus faecalis  Moderate Enterobacter cloacae/asburiae  Moderate Morganella morganii    No polymorphonuclear cells seen per low power field  Rare Gram Negative Rods per oil power field        RADIOLOGY REVIEWED:  < from: Xray Knee 3 Views, Right (05.16.18 @ 22:44) >  INTERPRETATION:  EXAMINATION: 3 views of the right knee    CLINICAL INFORMATION: Status post right knee arthroplasty.    Comparison: 4/8/2018.    IMPRESSION:     As seen previously the patient is status post explantation of a right   total knee arthroplasty with intramedullary ruthann bridging the  knee articulation with cement on both sides of the joint surrounding the  ruthann. The appearance is grossly unchanged from the prior exam.     < end of copied text >

## 2018-05-28 NOTE — PROGRESS NOTE ADULT - SUBJECTIVE AND OBJECTIVE BOX
IRASEMA KOHLER  [id number]    Subjective:  Patient is a 77y old  Female who presents with a chief complaint of fever (11 Apr 2018 14:25). no acute events overnight, pain controlled       Objective:  T(C): 36.8 (05-28-18 @ 00:18), Max: 37 (05-27-18 @ 21:30)  HR: 102 (05-28-18 @ 00:18) (86 - 102)  BP: 133/69 (05-28-18 @ 00:18) (126/73 - 133/69)  BP(mean): --  ABP: --  ABP(mean): --  RR: 18 (05-28-18 @ 00:18) (18 - 18)  SpO2: 95% (05-28-18 @ 00:18) (95% - 96%)  Wt(kg): --  CVP(mm Hg): --  CI: --  CAPILLARY BLOOD GLUCOSE       N/A      05-27 @ 07:01  -  05-28 @ 07:00  --------------------------------------------------------  IN:    Oral Fluid: 240 mL  Total IN: 240 mL    OUT:    Voided: 225 mL  Total OUT: 225 mL    Total NET: 15 mL            PHYSICAL EXAM:    General: NAD, resting in bed  Abd: VAC in place holding suction  MSK: Thigh VAC in place holding suction, knee eschar stable, open wound clean      MEDICATIONS  (STANDING):  ascorbic acid 500 milliGRAM(s) Oral daily  aspirin enteric coated 81 milliGRAM(s) Oral daily  buDESOnide 160 MICROgram(s)/formoterol 4.5 MICROgram(s) Inhaler 2 Puff(s) Inhalation two times a day  clotrimazole/betamethasone Cream 1 Application(s) Topical two times a day  docusate sodium 100 milliGRAM(s) Oral three times a day  epoetin jenni Injectable 64320 Unit(s) SubCutaneous every 7 days  ferrous    sulfate 325 milliGRAM(s) Oral daily  folic acid 1 milliGRAM(s) Oral daily  gabapentin 200 milliGRAM(s) Oral three times a day  melatonin 3 milliGRAM(s) Oral at bedtime  minocycline 100 milliGRAM(s) Oral two times a day  multivitamin 1 Tablet(s) Oral daily  mupirocin 2% Ointment 1 Application(s) Topical two times a day  nystatin    Suspension 385139 Unit(s) Oral every 6 hours  pantoprazole    Tablet 40 milliGRAM(s) Oral before breakfast  polyethylene glycol 3350 17 Gram(s) Oral daily  povidone iodine 10% Solution 1 Application(s) Topical every 12 hours  tiotropium 18 MICROgram(s) Capsule 1 Capsule(s) Inhalation daily  zinc oxide 20% Ointment 1 Application(s) Topical daily    MEDICATIONS  (PRN):  acetaminophen   Tablet 650 milliGRAM(s) Oral every 6 hours PRN For Temp greater than 38 C (100.4 F)  acetaminophen   Tablet. 650 milliGRAM(s) Oral every 6 hours PRN Mild Pain (1 - 3)  bisacodyl Suppository 10 milliGRAM(s) Rectal daily PRN Constipation  HYDROmorphone   Tablet 4 milliGRAM(s) Oral every 4 hours PRN Severe Pain (7 - 10)  HYDROmorphone   Tablet 2 milliGRAM(s) Oral every 4 hours PRN Moderate Pain (4 - 6)  HYDROmorphone  Injectable 0.5 milliGRAM(s) IV Push daily PRN Dressing change  lidocaine 2% Gel 1 Application(s) Topical daily PRN dressing change  lidocaine 2% Viscous 10 milliLiter(s) Swish and Spit every 6 hours PRN Mouth Sores  senna 2 Tablet(s) Oral at bedtime PRN Constipation      Assessment/Plan:  Patient is a 77y old  Female who presents with a chief complaint of fever (11 Apr 2018 14:25), has multiple open wounds. Should continue VAC dressing to help granulation  - Continue VAC, dressing change tomorrow  - WTD dakins to knee wound TID  - Cleared for discharge from PRS standpoint  - Dispo planning

## 2018-05-28 NOTE — PROGRESS NOTE ADULT - SUBJECTIVE AND OBJECTIVE BOX
Patient is a 77y old  Female who presents with a chief complaint of fever (11 Apr 2018 14:25)      INTERVAL HPI/OVERNIGHT EVENTS: feels well, wound Vac is being changed at bedside      Vital Signs Last 24 Hrs  T(C): 36.7 (28 May 2018 06:00), Max: 37 (27 May 2018 21:30)  T(F): 98.1 (28 May 2018 06:00), Max: 98.6 (27 May 2018 21:30)  HR: 104 (28 May 2018 06:00) (86 - 104)  BP: 118/75 (28 May 2018 06:00) (118/75 - 133/69)  BP(mean): --  RR: 18 (28 May 2018 06:00) (18 - 18)  SpO2: 95% (28 May 2018 06:00) (95% - 96%)    acetaminophen   Tablet 650 milliGRAM(s) Oral every 6 hours PRN  acetaminophen   Tablet. 650 milliGRAM(s) Oral every 6 hours PRN  ascorbic acid 500 milliGRAM(s) Oral daily  aspirin enteric coated 81 milliGRAM(s) Oral daily  bisacodyl Suppository 10 milliGRAM(s) Rectal daily PRN  buDESOnide 160 MICROgram(s)/formoterol 4.5 MICROgram(s) Inhaler 2 Puff(s) Inhalation two times a day  clotrimazole/betamethasone Cream 1 Application(s) Topical two times a day  docusate sodium 100 milliGRAM(s) Oral three times a day  epoetin jenni Injectable 51043 Unit(s) SubCutaneous every 7 days  ferrous    sulfate 325 milliGRAM(s) Oral daily  folic acid 1 milliGRAM(s) Oral daily  gabapentin 200 milliGRAM(s) Oral three times a day  HYDROmorphone   Tablet 4 milliGRAM(s) Oral every 4 hours PRN  HYDROmorphone   Tablet 2 milliGRAM(s) Oral every 4 hours PRN  HYDROmorphone  Injectable 0.5 milliGRAM(s) IV Push daily PRN  lidocaine 2% Gel 1 Application(s) Topical daily PRN  lidocaine 2% Viscous 10 milliLiter(s) Swish and Spit every 6 hours PRN  melatonin 3 milliGRAM(s) Oral at bedtime  minocycline 100 milliGRAM(s) Oral two times a day  multivitamin 1 Tablet(s) Oral daily  mupirocin 2% Ointment 1 Application(s) Topical two times a day  nystatin    Suspension 573782 Unit(s) Oral every 6 hours  pantoprazole    Tablet 40 milliGRAM(s) Oral before breakfast  polyethylene glycol 3350 17 Gram(s) Oral daily  povidone iodine 10% Solution 1 Application(s) Topical every 12 hours  senna 2 Tablet(s) Oral at bedtime PRN  tiotropium 18 MICROgram(s) Capsule 1 Capsule(s) Inhalation daily  zinc oxide 20% Ointment 1 Application(s) Topical daily      PHYSICAL EXAM:  GENERAL: NAD,   EYES: conjunctiva and sclera clear  ENMT: Moist mucous membranes  NECK: Supple, No JVD, Normal thyroid  NERVOUS SYSTEM:  Alert & Oriented X3,   CHEST/LUNG: Clear to auscultation bilaterally; No rales, rhonchi, wheezing, or rubs  HEART: Regular rate and rhythm; No murmurs, rubs, or gallops  ABDOMEN: Soft, Nontender, Nondistended; Bowel sounds present  EXTREMITIES:  2+ Peripheral Pulses, No clubbing, cyanosis, or edema  LYMPH: No lymphadenopathy noted  SKIN: No rashes or lesions    Consultant(s) Notes Reviewed:  [x ] YES  [ ] NO  Care Discussed with Consultants/Other Providers [ x] YES  [ ] NO    LABS:                        8.7    13.81 )-----------( 307      ( 28 May 2018 08:48 )             28.1     05-28    137  |  97  |  31<H>  ----------------------------<  124<H>  4.6   |  27  |  0.75    Ca    10.1      28 May 2018 07:17      PT/INR - ( 28 May 2018 08:44 )   PT: 18.6 sec;   INR: 1.63 ratio             CAPILLARY BLOOD GLUCOSE                RADIOLOGY & ADDITIONAL TESTS:    Imaging Personally Reviewed:  [x ] YES  [ ] NO

## 2018-05-28 NOTE — PROGRESS NOTE ADULT - ASSESSMENT
77 year old female  s/p rudy from right knee and spacer for strept infection then returned with nonhealing wound and had spacer change, fusion ruthann, plastic closure and grew mrsa. Wound developed necrotic skin and then pneumonia and hematoma over the right thigh with necrosis of skin too.   Now finished full course of iv abx and on suppressive minocycline for the knee. Pneumonia treated.  Has debrided thigh and abdomen wound with vac. No uncontrolled infection at present  Reviewed notes dermatology performed 4mm punch bx performed of R thigh.  Wound was closed with gel foam, no sutures placed done on 5/23, reviewed cx that were obtained: grew enterobacter and morganella, in my opinion cx findings are suggestive of colonized roopa in a patient with prolonged hospital stay, I examined site right thigh no signs of cellulitis, therefore no additional abx are indicated at this time .  Issues remain of possible pyoderma gangrenosum  Fluctuating wbc being followed  Daughter at bedside, case reviewed  Plan:   continue suppressive minocycline as planned  Supportive care

## 2018-05-29 LAB
HCT VFR BLD CALC: 29.9 % — LOW (ref 34.5–45)
HGB BLD-MCNC: 9.1 G/DL — LOW (ref 11.5–15.5)
INR BLD: 2.27 RATIO — HIGH (ref 0.88–1.16)
MCHC RBC-ENTMCNC: 27.7 PG — SIGNIFICANT CHANGE UP (ref 27–34)
MCHC RBC-ENTMCNC: 30.4 GM/DL — LOW (ref 32–36)
MCV RBC AUTO: 91.2 FL — SIGNIFICANT CHANGE UP (ref 80–100)
PLATELET # BLD AUTO: 318 K/UL — SIGNIFICANT CHANGE UP (ref 150–400)
PROTHROM AB SERPL-ACNC: 26.1 SEC — HIGH (ref 10–13.1)
RBC # BLD: 3.28 M/UL — LOW (ref 3.8–5.2)
RBC # FLD: 19.3 % — HIGH (ref 10.3–14.5)
WBC # BLD: 13.8 K/UL — HIGH (ref 3.8–10.5)
WBC # FLD AUTO: 13.8 K/UL — HIGH (ref 3.8–10.5)

## 2018-05-29 RX ORDER — HYDROMORPHONE HYDROCHLORIDE 2 MG/ML
2 INJECTION INTRAMUSCULAR; INTRAVENOUS; SUBCUTANEOUS EVERY 4 HOURS
Qty: 0 | Refills: 0 | Status: DISCONTINUED | OUTPATIENT
Start: 2018-05-29 | End: 2018-06-04

## 2018-05-29 RX ORDER — WARFARIN SODIUM 2.5 MG/1
1.5 TABLET ORAL ONCE
Qty: 0 | Refills: 0 | Status: COMPLETED | OUTPATIENT
Start: 2018-05-29 | End: 2018-05-29

## 2018-05-29 RX ORDER — HYDROMORPHONE HYDROCHLORIDE 2 MG/ML
4 INJECTION INTRAMUSCULAR; INTRAVENOUS; SUBCUTANEOUS EVERY 4 HOURS
Qty: 0 | Refills: 0 | Status: DISCONTINUED | OUTPATIENT
Start: 2018-05-29 | End: 2018-06-04

## 2018-05-29 RX ADMIN — HYDROMORPHONE HYDROCHLORIDE 2 MILLIGRAM(S): 2 INJECTION INTRAMUSCULAR; INTRAVENOUS; SUBCUTANEOUS at 11:08

## 2018-05-29 RX ADMIN — CLOTRIMAZOLE AND BETAMETHASONE DIPROPIONATE 1 APPLICATION(S): 10; .5 CREAM TOPICAL at 18:11

## 2018-05-29 RX ADMIN — Medication 1 TABLET(S): at 12:36

## 2018-05-29 RX ADMIN — HYDROMORPHONE HYDROCHLORIDE 2 MILLIGRAM(S): 2 INJECTION INTRAMUSCULAR; INTRAVENOUS; SUBCUTANEOUS at 23:41

## 2018-05-29 RX ADMIN — Medication 1 APPLICATION(S): at 06:07

## 2018-05-29 RX ADMIN — GABAPENTIN 200 MILLIGRAM(S): 400 CAPSULE ORAL at 12:36

## 2018-05-29 RX ADMIN — Medication 325 MILLIGRAM(S): at 12:36

## 2018-05-29 RX ADMIN — Medication 1 APPLICATION(S): at 18:07

## 2018-05-29 RX ADMIN — Medication 100 MILLIGRAM(S): at 12:36

## 2018-05-29 RX ADMIN — Medication 500 MILLIGRAM(S): at 11:08

## 2018-05-29 RX ADMIN — ZINC OXIDE 1 APPLICATION(S): 200 OINTMENT TOPICAL at 11:09

## 2018-05-29 RX ADMIN — Medication 100 MILLIGRAM(S): at 22:23

## 2018-05-29 RX ADMIN — Medication 500000 UNIT(S): at 11:09

## 2018-05-29 RX ADMIN — BUDESONIDE AND FORMOTEROL FUMARATE DIHYDRATE 2 PUFF(S): 160; 4.5 AEROSOL RESPIRATORY (INHALATION) at 06:19

## 2018-05-29 RX ADMIN — POLYETHYLENE GLYCOL 3350 17 GRAM(S): 17 POWDER, FOR SOLUTION ORAL at 11:09

## 2018-05-29 RX ADMIN — HYDROMORPHONE HYDROCHLORIDE 2 MILLIGRAM(S): 2 INJECTION INTRAMUSCULAR; INTRAVENOUS; SUBCUTANEOUS at 22:41

## 2018-05-29 RX ADMIN — Medication 500000 UNIT(S): at 23:48

## 2018-05-29 RX ADMIN — Medication 1 MILLIGRAM(S): at 12:36

## 2018-05-29 RX ADMIN — Medication 81 MILLIGRAM(S): at 12:36

## 2018-05-29 RX ADMIN — Medication 100 MILLIGRAM(S): at 06:08

## 2018-05-29 RX ADMIN — BUDESONIDE AND FORMOTEROL FUMARATE DIHYDRATE 2 PUFF(S): 160; 4.5 AEROSOL RESPIRATORY (INHALATION) at 18:11

## 2018-05-29 RX ADMIN — HYDROMORPHONE HYDROCHLORIDE 2 MILLIGRAM(S): 2 INJECTION INTRAMUSCULAR; INTRAVENOUS; SUBCUTANEOUS at 06:04

## 2018-05-29 RX ADMIN — HYDROMORPHONE HYDROCHLORIDE 2 MILLIGRAM(S): 2 INJECTION INTRAMUSCULAR; INTRAVENOUS; SUBCUTANEOUS at 12:08

## 2018-05-29 RX ADMIN — PANTOPRAZOLE SODIUM 40 MILLIGRAM(S): 20 TABLET, DELAYED RELEASE ORAL at 06:08

## 2018-05-29 RX ADMIN — Medication 500000 UNIT(S): at 06:08

## 2018-05-29 RX ADMIN — MUPIROCIN 1 APPLICATION(S): 20 OINTMENT TOPICAL at 06:06

## 2018-05-29 RX ADMIN — MINOCYCLINE HYDROCHLORIDE 100 MILLIGRAM(S): 45 TABLET, EXTENDED RELEASE ORAL at 06:05

## 2018-05-29 RX ADMIN — WARFARIN SODIUM 1.5 MILLIGRAM(S): 2.5 TABLET ORAL at 22:23

## 2018-05-29 RX ADMIN — Medication 3 MILLIGRAM(S): at 22:23

## 2018-05-29 RX ADMIN — TIOTROPIUM BROMIDE 1 CAPSULE(S): 18 CAPSULE ORAL; RESPIRATORY (INHALATION) at 12:36

## 2018-05-29 RX ADMIN — GABAPENTIN 200 MILLIGRAM(S): 400 CAPSULE ORAL at 22:23

## 2018-05-29 RX ADMIN — CLOTRIMAZOLE AND BETAMETHASONE DIPROPIONATE 1 APPLICATION(S): 10; .5 CREAM TOPICAL at 06:06

## 2018-05-29 RX ADMIN — MINOCYCLINE HYDROCHLORIDE 100 MILLIGRAM(S): 45 TABLET, EXTENDED RELEASE ORAL at 18:07

## 2018-05-29 RX ADMIN — GABAPENTIN 200 MILLIGRAM(S): 400 CAPSULE ORAL at 06:04

## 2018-05-29 RX ADMIN — MUPIROCIN 1 APPLICATION(S): 20 OINTMENT TOPICAL at 18:12

## 2018-05-29 RX ADMIN — Medication 500000 UNIT(S): at 18:07

## 2018-05-29 RX ADMIN — HYDROMORPHONE HYDROCHLORIDE 2 MILLIGRAM(S): 2 INJECTION INTRAMUSCULAR; INTRAVENOUS; SUBCUTANEOUS at 15:08

## 2018-05-29 NOTE — PROGRESS NOTE ADULT - ASSESSMENT
local wound care to knee and vac therapy to abd/thigh per plastics and wound care teams  PO abx per ID team  encourage patient to spend majority of bed oob in bedside chair as able, oob for first time today in a long time x 2 hours  PT to help mobilize, she must remain 50% wb on the RLE and NO KNEE MOTION allowed, knee immobilizer if oob  coumadin, inr goal 2-3  continue to optimize nutrition  continue to involve psych	  dispo planning for dc to snf    I spoke with Mr. Rdz after my conversation with him earlier this morning. He was much happier that the previous discussion recs were enacted. He apologized for his aggression over the phone but is understandably frustrated given the prolonged hospitalization course for his wife. He stated that he appreciated the care of all the medical teams involved.

## 2018-05-29 NOTE — CHART NOTE - NSCHARTNOTEFT_GEN_A_CORE
follow up- had a lengthy d/w pt/ RN present on medication /side effect and especially use of iv narcotics; to continue with oral pain medication and d/w pt/RN regarding pain management before dressing change ;  Joya Wallace(NP)  [address], [phone]

## 2018-05-29 NOTE — PROGRESS NOTE ADULT - SUBJECTIVE AND OBJECTIVE BOX
PLASTIC SURGERY:    S: Patient seen and examined. No acute events overnight.    O:  Vital Signs Last 24 Hrs  T(C): 36.7 (29 May 2018 06:02), Max: 37.4 (28 May 2018 20:22)  T(F): 98 (29 May 2018 06:02), Max: 99.3 (28 May 2018 20:22)  HR: 97 (29 May 2018 06:02) (87 - 97)  BP: 114/75 (29 May 2018 06:02) (112/73 - 114/75)  BP(mean): --  RR: 18 (29 May 2018 06:02) (18 - 18)  SpO2: 95% (29 May 2018 06:02) (95% - 96%)    I&O's Detail    28 May 2018 07:01  -  29 May 2018 07:00  --------------------------------------------------------  IN:    Oral Fluid: 460 mL  Total IN: 460 mL    OUT:    Voided: 100 mL  Total OUT: 100 mL    Total NET: 360 mL    MEDICATIONS  (STANDING):  ascorbic acid 500 milliGRAM(s) Oral daily  aspirin enteric coated 81 milliGRAM(s) Oral daily  buDESOnide 160 MICROgram(s)/formoterol 4.5 MICROgram(s) Inhaler 2 Puff(s) Inhalation two times a day  clotrimazole/betamethasone Cream 1 Application(s) Topical two times a day  docusate sodium 100 milliGRAM(s) Oral three times a day  epoetin jenni Injectable 12594 Unit(s) SubCutaneous every 7 days  ferrous    sulfate 325 milliGRAM(s) Oral daily  folic acid 1 milliGRAM(s) Oral daily  gabapentin 200 milliGRAM(s) Oral three times a day  melatonin 3 milliGRAM(s) Oral at bedtime  minocycline 100 milliGRAM(s) Oral two times a day  multivitamin 1 Tablet(s) Oral daily  mupirocin 2% Ointment 1 Application(s) Topical two times a day  nystatin    Suspension 431078 Unit(s) Oral every 6 hours  pantoprazole    Tablet 40 milliGRAM(s) Oral before breakfast  polyethylene glycol 3350 17 Gram(s) Oral daily  povidone iodine 10% Solution 1 Application(s) Topical every 12 hours  tiotropium 18 MICROgram(s) Capsule 1 Capsule(s) Inhalation daily  zinc oxide 20% Ointment 1 Application(s) Topical daily    MEDICATIONS  (PRN):  acetaminophen   Tablet 650 milliGRAM(s) Oral every 6 hours PRN For Temp greater than 38 C (100.4 F)  acetaminophen   Tablet. 650 milliGRAM(s) Oral every 6 hours PRN Mild Pain (1 - 3)  bisacodyl Suppository 10 milliGRAM(s) Rectal daily PRN Constipation  HYDROmorphone   Tablet 4 milliGRAM(s) Oral every 4 hours PRN Severe Pain (7 - 10)  HYDROmorphone   Tablet 2 milliGRAM(s) Oral every 4 hours PRN Moderate Pain (4 - 6)  lidocaine 2% Gel 1 Application(s) Topical daily PRN dressing change  lidocaine 2% Viscous 10 milliLiter(s) Swish and Spit every 6 hours PRN Mouth Sores  senna 2 Tablet(s) Oral at bedtime PRN Constipation                        9.1    13.80 )-----------( 318      ( 29 May 2018 09:19 )             29.9     05-28    137  |  97  |  31<H>  ----------------------------<  124<H>  4.6   |  27  |  0.75    Ca    10.1      28 May 2018 07:17    Physical Exam:  Gen: Laying in bed, NAD, alert and oriented.   RLE knee wound with eschar peeling off, granulation tissue at base, Wet-to-dry dressing placed. Proximal RLE and abdominal wound vacs in place.    Lines:   IV: patent, in place.

## 2018-05-29 NOTE — PROGRESS NOTE ADULT - ASSESSMENT
77 year old female s/p rudy from right knee and spacer for strept infection then returned with nonhealing wound and had spacer change, fusion ruthann, plastic closure and grew mrsa.   Wound developed necrotic skin, then pneumonia, and hematoma over the right thigh with necrosis of skin too.   Finished full course of iv abx, on suppressive minocycline for the knee. Pneumonia treated.  Debrided thigh and abdomen wound with VAC.  Dermatology performed 4mm punch bx performed of R thigh- cx grew enterobacter and morganella, suggestive of colonizing roopa in a patient with prolonged hospital stay,   Path as reviewed above- inflammatory infiltrate, no features of pyoderma in sample  Afebrile, stable WBC    Plan:   Continue suppressive minocycline   Local wound care likely to require most time/intervention, and remain most debilitating  R knee eschar quite formidable

## 2018-05-29 NOTE — PROGRESS NOTE ADULT - ASSESSMENT
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     # Recent septic Rt TKR - abx spacer exchanged and plastics complex wound closure  # PMR on chronic steroids  # right knee eschar, right thigh necrosis and abd wound   #Multiple skin ulcers  # Anemia chronic disease    Plan:  wound care and wound vac per plastics and ortho  s/p multiple debridements  multiple ulcerated wounds: Derm c/s appreciated-f/u punch bx Cx-ngtd and f/u path  remains afebrile, cont suppressive minocycline,  ID f/u noted  appreciate heme recs, started on procrit for anemia of chronic disease  cont coumadin and asa  f/u plastics recs  Pain control- transition to po-in preparation for discharge to rehab  PT OOB no knee motion, immobilizer if out of bed  bowel regimen

## 2018-05-29 NOTE — CHART NOTE - NSCHARTNOTEFT_GEN_A_CORE
Mr. Rdz called me this morning on the phone very upset.    1. He would like her to be txr OOB to chair using lift, please have PT come back to eval patient  2. He stated that it took >12 hours to get her a lidocaine patch yesterday, it is now in place and she is happy about this  3. He is upset that she is becoming somewhat sedated on dilaudid and requests this to be decreased  4. He was asking when she can txr to snf, from notes cleared by ortho and plastics. I tried multiple times to explain to him that I would recommend case management to come speak with him about this and he became more frustrated yelling that I need to make the arrangements myself. Again I calmly tried to explain to him that I am a consultant on the case and that I would reach out to the medicine team to have the  work with him and his wife to make plans for snf transfer.    We planned to speak again later today or tomorrow.    Nate Bass MD Mr. Rdz called me this morning on the phone very upset.    1. He would like her to be txr OOB to chair using lift, please have PT come back to eval patient  2. He stated that it took >12 hours to get her a lidocaine patch yesterday, it is now in place and she is happy about this  3. He is upset that she is becoming somewhat sedated on dilaudid and requests this to be decreased  4. He was asking when she can txr to snf, from notes cleared by ortho and plastics. I tried multiple times to explain to him that I would recommend case management to come speak with him about this and he became more frustrated yelling that I need to make the arrangements myself. Again I calmly tried to explain to him that I am a consultant on the case and that I would reach out to the medicine team to have the  work with him and his wife to make plans for snf transfer.    Further I am concerned about the fact that her INR has been subtherapeutic for 1 week now. Goal is 2-3 given hx DVT.    We planned to speak again later today or tomorrow.    Nate Bass MD

## 2018-05-29 NOTE — PROGRESS NOTE ADULT - ASSESSMENT
A: 77F with complex PMH/PSH who recently had placement of a right knee antibiotic spacer followed by PRS closure c/b skin necrosis and formation of an eschar over the anterior knee joint. The patient was readmitted with fevers and right thigh cellulitis which was complicated by skin breakdown. Patient also developed wound at the right/mid lower quadrant of the abdomen.    P:  >> Dressing change TID to knee  >> Betadine to eschar bid  >> VACs on abdomen and R thigh to be changed tomorrow  >> No active Rheum. issues- continue to hold steroids  >> Derm- ulcers, not pyoderma gangrenosum  >> Medical optimization as per primary team.  >> DVT prophylaxis.  >> Protein supplementation of diet to encourage wound healing.     [facility] Plastic Surgery Pager -- [phone]  [facility] Plastic Surgery Pager -- [phone]

## 2018-05-29 NOTE — PROGRESS NOTE ADULT - SUBJECTIVE AND OBJECTIVE BOX
CC: f/u for infected rt knee    Patient reports pain controlled at present, quite depressed, despondent at times    REVIEW OF SYSTEMS:  All other review of systems negative (Comprehensive ROS)    Antimicrobials  minocycline 100 milliGRAM(s) Oral two times a day  nystatin    Suspension 531077 Unit(s) Oral every 6 hours    Other Medications Reviewed    Vital Signs Last 24 Hrs  T(F): 97.9 (29 May 2018 10:41), Max: 99.3 (28 May 2018 20:22)  HR: 88 (29 May 2018 10:41) (87 - 97)  BP: 144/85 (29 May 2018 10:41) (112/73 - 144/85)  RR: 18 (29 May 2018 10:41) (18 - 18)  SpO2: 92% (29 May 2018 10:41) (92% - 96%)    PHYSICAL EXAM:  General: alert, no acute distress  Eyes:  anicteric, no conjunctival injection, no discharge  Oropharynx: no lesions or injection 	  Neck: supple, without adenopathy  Lungs: clear to auscultation, diminished at bases  Heart: regular rate and rhythm; no murmur, rubs or gallops  Abdomen: soft, nondistended, nontender, without mass or organomegaly  Skin: abdom wound VAC in place  Extremities: + edema.  R thigh VAC in place; R knee large eschar unroofed at distal aspect; malodor, no erythema  Neurologic: alert, oriented, moves all extremities    LAB RESULTS:                        9.1    13.80 )-----------( 318      ( 29 May 2018 09:19 )             29.9   05-28    137  |  97  |  31<H>  ----------------------------<  124<H>  4.6   |  27  |  0.75    Ca    10.1      28 May 2018 07:17      MICROBIOLOGY:  RECENT CULTURES:  05-23 @ 18:05 .Tissue Other, right thigh  Growth in fluid media only Enterococcus faecalis  Moderate Enterobacter cloacae/asburiae  Moderate Morganella morganii  No polymorphonuclear cells seen per low power field  Rare Gram Negative Rods per oil power field      Surgical Pathology Report (05.23.18 @ 14:45)  Final Diagnosis  Right leg, biopsy  - Focal ulceration with superficial and deep mixed  inflammatory infiltrate (see note).  Note: Multiple levels are examined. The findings are consistent  with changes associated with an ulcer. Features of pyoderma  gangrenosum are not seen in this biopsy sample. Correlation with  clinical findings and culture results are  recommended.    Verified by:Romain José M.D., Dermatopathologist  (Electronic Signature)  Reported on: 05/25/18 15:47 EDT,[address], [address], [place] [address]  _________________________________________________________________        RADIOLOGY REVIEWED:  Xray Knee 3 Views, Right (05.16.18 @ 22:44) >  As seen previously the patient is status post explantation of a right   total knee arthroplasty with intramedullary ruthann bridging the  knee articulation with cement on both sides of the joint surrounding the  ruthann. The appearance is grossly unchanged from the prior exam.

## 2018-05-29 NOTE — PROGRESS NOTE ADULT - SUBJECTIVE AND OBJECTIVE BOX
Patient is a 77y old  Female who presents with a chief complaint of fever (11 Apr 2018 14:25)      INTERVAL HPI/OVERNIGHT EVENTS: noted, s/p wound care this am  pain during dressing change tolerable with dilaudid      Vital Signs Last 24 Hrs  T(C): 36.7 (29 May 2018 06:02), Max: 37.4 (28 May 2018 20:22)  T(F): 98 (29 May 2018 06:02), Max: 99.3 (28 May 2018 20:22)  HR: 97 (29 May 2018 06:02) (87 - 97)  BP: 114/75 (29 May 2018 06:02) (112/73 - 114/75)  BP(mean): --  RR: 18 (29 May 2018 06:02) (18 - 18)  SpO2: 95% (29 May 2018 06:02) (95% - 96%)    acetaminophen   Tablet 650 milliGRAM(s) Oral every 6 hours PRN  acetaminophen   Tablet. 650 milliGRAM(s) Oral every 6 hours PRN  ascorbic acid 500 milliGRAM(s) Oral daily  aspirin enteric coated 81 milliGRAM(s) Oral daily  bisacodyl Suppository 10 milliGRAM(s) Rectal daily PRN  buDESOnide 160 MICROgram(s)/formoterol 4.5 MICROgram(s) Inhaler 2 Puff(s) Inhalation two times a day  clotrimazole/betamethasone Cream 1 Application(s) Topical two times a day  docusate sodium 100 milliGRAM(s) Oral three times a day  epoetin jenni Injectable 62707 Unit(s) SubCutaneous every 7 days  ferrous    sulfate 325 milliGRAM(s) Oral daily  folic acid 1 milliGRAM(s) Oral daily  gabapentin 200 milliGRAM(s) Oral three times a day  HYDROmorphone   Tablet 4 milliGRAM(s) Oral every 4 hours PRN  HYDROmorphone   Tablet 2 milliGRAM(s) Oral every 4 hours PRN  lidocaine 2% Gel 1 Application(s) Topical daily PRN  lidocaine 2% Viscous 10 milliLiter(s) Swish and Spit every 6 hours PRN  melatonin 3 milliGRAM(s) Oral at bedtime  minocycline 100 milliGRAM(s) Oral two times a day  multivitamin 1 Tablet(s) Oral daily  mupirocin 2% Ointment 1 Application(s) Topical two times a day  nystatin    Suspension 872548 Unit(s) Oral every 6 hours  pantoprazole    Tablet 40 milliGRAM(s) Oral before breakfast  polyethylene glycol 3350 17 Gram(s) Oral daily  povidone iodine 10% Solution 1 Application(s) Topical every 12 hours  senna 2 Tablet(s) Oral at bedtime PRN  tiotropium 18 MICROgram(s) Capsule 1 Capsule(s) Inhalation daily  zinc oxide 20% Ointment 1 Application(s) Topical daily      PHYSICAL EXAM:  GENERAL: NAD,   EYES: conjunctiva and sclera clear  ENMT: Moist mucous membranes  NECK: Supple, No JVD, Normal thyroid  NERVOUS SYSTEM:  Alert & Oriented X3,   CHEST/LUNG: Clear to auscultation bilaterally; No rales, rhonchi, wheezing, or rubs  HEART: Regular rate and rhythm; No murmurs, rubs, or gallops  ABDOMEN: Soft, Nontender, Nondistended; Bowel sounds present  EXTREMITIES:  rt thigh and abdominal wound Vac, lt thigh dressing  LYMPH: No lymphadenopathy noted      Consultant(s) Notes Reviewed:  [x ] YES  [ ] NO  Care Discussed with Consultants/Other Providers [ x] YES  [ ] NO    LABS:                        9.1    13.80 )-----------( 318      ( 29 May 2018 09:19 )             29.9     05-28    137  |  97  |  31<H>  ----------------------------<  124<H>  4.6   |  27  |  0.75    Ca    10.1      28 May 2018 07:17      PT/INR - ( 29 May 2018 09:18 )   PT: 26.1 sec;   INR: 2.27 ratio             CAPILLARY BLOOD GLUCOSE                RADIOLOGY & ADDITIONAL TESTS:    Imaging Personally Reviewed:  [ ] YES  [ ] NO

## 2018-05-29 NOTE — PROGRESS NOTE ADULT - ATTENDING COMMENTS
Plastics cleared pt for discharge  dc planning to rehab in progress    Reina Murphy MD  [facility]    Dr Marrero will be covering me starting   5/ 30 /18  Please page

## 2018-05-30 ENCOUNTER — RESULT REVIEW (OUTPATIENT)
Age: 78
End: 2018-05-30

## 2018-05-30 LAB
ANION GAP SERPL CALC-SCNC: 14 MMOL/L — SIGNIFICANT CHANGE UP (ref 5–17)
BASOPHILS # BLD AUTO: 0.1 K/UL — SIGNIFICANT CHANGE UP (ref 0–0.2)
BASOPHILS NFR BLD AUTO: 0.7 % — SIGNIFICANT CHANGE UP (ref 0–2)
BUN SERPL-MCNC: 34 MG/DL — HIGH (ref 7–23)
CALCIUM SERPL-MCNC: 10.2 MG/DL — SIGNIFICANT CHANGE UP (ref 8.4–10.5)
CHLORIDE SERPL-SCNC: 95 MMOL/L — LOW (ref 96–108)
CO2 SERPL-SCNC: 28 MMOL/L — SIGNIFICANT CHANGE UP (ref 22–31)
CREAT SERPL-MCNC: 0.86 MG/DL — SIGNIFICANT CHANGE UP (ref 0.5–1.3)
EOSINOPHIL # BLD AUTO: 0.5 K/UL — SIGNIFICANT CHANGE UP (ref 0–0.5)
EOSINOPHIL NFR BLD AUTO: 3.5 % — SIGNIFICANT CHANGE UP (ref 0–6)
GLUCOSE SERPL-MCNC: 111 MG/DL — HIGH (ref 70–99)
HCT VFR BLD CALC: 29.8 % — LOW (ref 34.5–45)
HGB BLD-MCNC: 9.1 G/DL — LOW (ref 11.5–15.5)
INR BLD: 2.93 RATIO — HIGH (ref 0.88–1.16)
LYMPHOCYTES # BLD AUTO: 24.9 % — SIGNIFICANT CHANGE UP (ref 13–44)
LYMPHOCYTES # BLD AUTO: 3.6 K/UL — HIGH (ref 1–3.3)
MCHC RBC-ENTMCNC: 27.2 PG — SIGNIFICANT CHANGE UP (ref 27–34)
MCHC RBC-ENTMCNC: 30.5 GM/DL — LOW (ref 32–36)
MCV RBC AUTO: 89.2 FL — SIGNIFICANT CHANGE UP (ref 80–100)
MONOCYTES # BLD AUTO: 1.2 K/UL — HIGH (ref 0–0.9)
MONOCYTES NFR BLD AUTO: 8.2 % — SIGNIFICANT CHANGE UP (ref 2–14)
NEUTROPHILS # BLD AUTO: 9.1 K/UL — HIGH (ref 1.8–7.4)
NEUTROPHILS NFR BLD AUTO: 62.7 % — SIGNIFICANT CHANGE UP (ref 43–77)
PLATELET # BLD AUTO: 306 K/UL — SIGNIFICANT CHANGE UP (ref 150–400)
POTASSIUM SERPL-MCNC: 4.7 MMOL/L — SIGNIFICANT CHANGE UP (ref 3.5–5.3)
POTASSIUM SERPL-SCNC: 4.7 MMOL/L — SIGNIFICANT CHANGE UP (ref 3.5–5.3)
PROTHROM AB SERPL-ACNC: 32.4 SEC — HIGH (ref 9.8–12.7)
RBC # BLD: 3.34 M/UL — LOW (ref 3.8–5.2)
RBC # FLD: 17.4 % — HIGH (ref 10.3–14.5)
SODIUM SERPL-SCNC: 137 MMOL/L — SIGNIFICANT CHANGE UP (ref 135–145)
WBC # BLD: 14.5 K/UL — HIGH (ref 3.8–10.5)
WBC # FLD AUTO: 14.5 K/UL — HIGH (ref 3.8–10.5)

## 2018-05-30 PROCEDURE — 88304 TISSUE EXAM BY PATHOLOGIST: CPT | Mod: 26

## 2018-05-30 RX ORDER — SODIUM HYPOCHLORITE 0.125 %
1 SOLUTION, NON-ORAL MISCELLANEOUS DAILY
Qty: 0 | Refills: 0 | Status: DISCONTINUED | OUTPATIENT
Start: 2018-05-30 | End: 2018-06-01

## 2018-05-30 RX ORDER — HYDROMORPHONE HYDROCHLORIDE 2 MG/ML
0.5 INJECTION INTRAMUSCULAR; INTRAVENOUS; SUBCUTANEOUS ONCE
Qty: 0 | Refills: 0 | Status: DISCONTINUED | OUTPATIENT
Start: 2018-05-30 | End: 2018-05-30

## 2018-05-30 RX ADMIN — Medication 500000 UNIT(S): at 06:05

## 2018-05-30 RX ADMIN — MUPIROCIN 1 APPLICATION(S): 20 OINTMENT TOPICAL at 06:04

## 2018-05-30 RX ADMIN — MINOCYCLINE HYDROCHLORIDE 100 MILLIGRAM(S): 45 TABLET, EXTENDED RELEASE ORAL at 06:05

## 2018-05-30 RX ADMIN — HYDROMORPHONE HYDROCHLORIDE 2 MILLIGRAM(S): 2 INJECTION INTRAMUSCULAR; INTRAVENOUS; SUBCUTANEOUS at 07:06

## 2018-05-30 RX ADMIN — MINOCYCLINE HYDROCHLORIDE 100 MILLIGRAM(S): 45 TABLET, EXTENDED RELEASE ORAL at 19:26

## 2018-05-30 RX ADMIN — Medication 1 APPLICATION(S): at 20:19

## 2018-05-30 RX ADMIN — Medication 100 MILLIGRAM(S): at 22:36

## 2018-05-30 RX ADMIN — HYDROMORPHONE HYDROCHLORIDE 2 MILLIGRAM(S): 2 INJECTION INTRAMUSCULAR; INTRAVENOUS; SUBCUTANEOUS at 06:06

## 2018-05-30 RX ADMIN — Medication 500000 UNIT(S): at 12:41

## 2018-05-30 RX ADMIN — Medication 500000 UNIT(S): at 19:34

## 2018-05-30 RX ADMIN — CLOTRIMAZOLE AND BETAMETHASONE DIPROPIONATE 1 APPLICATION(S): 10; .5 CREAM TOPICAL at 06:04

## 2018-05-30 RX ADMIN — Medication 100 MILLIGRAM(S): at 15:56

## 2018-05-30 RX ADMIN — HYDROMORPHONE HYDROCHLORIDE 0.5 MILLIGRAM(S): 2 INJECTION INTRAMUSCULAR; INTRAVENOUS; SUBCUTANEOUS at 11:01

## 2018-05-30 RX ADMIN — HYDROMORPHONE HYDROCHLORIDE 2 MILLIGRAM(S): 2 INJECTION INTRAMUSCULAR; INTRAVENOUS; SUBCUTANEOUS at 10:37

## 2018-05-30 RX ADMIN — Medication 1 TABLET(S): at 12:41

## 2018-05-30 RX ADMIN — BUDESONIDE AND FORMOTEROL FUMARATE DIHYDRATE 2 PUFF(S): 160; 4.5 AEROSOL RESPIRATORY (INHALATION) at 19:28

## 2018-05-30 RX ADMIN — POLYETHYLENE GLYCOL 3350 17 GRAM(S): 17 POWDER, FOR SOLUTION ORAL at 12:42

## 2018-05-30 RX ADMIN — Medication 500 MILLIGRAM(S): at 12:41

## 2018-05-30 RX ADMIN — BUDESONIDE AND FORMOTEROL FUMARATE DIHYDRATE 2 PUFF(S): 160; 4.5 AEROSOL RESPIRATORY (INHALATION) at 06:03

## 2018-05-30 RX ADMIN — Medication 1 MILLIGRAM(S): at 12:41

## 2018-05-30 RX ADMIN — ZINC OXIDE 1 APPLICATION(S): 200 OINTMENT TOPICAL at 12:43

## 2018-05-30 RX ADMIN — MUPIROCIN 1 APPLICATION(S): 20 OINTMENT TOPICAL at 19:27

## 2018-05-30 RX ADMIN — Medication 1 APPLICATION(S): at 06:05

## 2018-05-30 RX ADMIN — Medication 81 MILLIGRAM(S): at 12:41

## 2018-05-30 RX ADMIN — TIOTROPIUM BROMIDE 1 CAPSULE(S): 18 CAPSULE ORAL; RESPIRATORY (INHALATION) at 12:42

## 2018-05-30 RX ADMIN — Medication 100 MILLIGRAM(S): at 06:05

## 2018-05-30 RX ADMIN — GABAPENTIN 200 MILLIGRAM(S): 400 CAPSULE ORAL at 06:05

## 2018-05-30 RX ADMIN — HYDROMORPHONE HYDROCHLORIDE 2 MILLIGRAM(S): 2 INJECTION INTRAMUSCULAR; INTRAVENOUS; SUBCUTANEOUS at 19:24

## 2018-05-30 RX ADMIN — GABAPENTIN 200 MILLIGRAM(S): 400 CAPSULE ORAL at 22:37

## 2018-05-30 RX ADMIN — PANTOPRAZOLE SODIUM 40 MILLIGRAM(S): 20 TABLET, DELAYED RELEASE ORAL at 06:05

## 2018-05-30 RX ADMIN — GABAPENTIN 200 MILLIGRAM(S): 400 CAPSULE ORAL at 15:57

## 2018-05-30 RX ADMIN — CLOTRIMAZOLE AND BETAMETHASONE DIPROPIONATE 1 APPLICATION(S): 10; .5 CREAM TOPICAL at 19:26

## 2018-05-30 RX ADMIN — Medication 325 MILLIGRAM(S): at 12:41

## 2018-05-30 NOTE — PROGRESS NOTE ADULT - ASSESSMENT
local wound care to knee and vac therapy to abd/thigh per plastics and wound care teams  PO abx per ID team  encourage patient to spend majority of bed oob in bedside chair as able, oob for first time yesterday x 2 hours  PT to help mobilize, she must remain 50% wb on the RLE and NO KNEE MOTION allowed, knee immobilizer if oob  coumadin, inr goal 2-3, therapeutic  continue to optimize nutrition  continue to involve psych	  dispo planning for dc to snf	    WBC continues to rise despite afebrile, unclear source but possible from abdomen wound? RLE continues to heal and without obvious source

## 2018-05-30 NOTE — PROGRESS NOTE ADULT - SUBJECTIVE AND OBJECTIVE BOX
SUBJECTIVE: Pt seen, chart reviewed.      Allergies    amoxicillin (Other)    Intolerances    IV Contrast (Flushing (Skin); Nausea)      aspirin enteric coated 81 milliGRAM(s) Oral daily  minocycline 100 milliGRAM(s) Oral two times a day  nystatin    Suspension 634389 Unit(s) Oral every 6 hours      Rerquested by plastics and family to re evaluate patient  Current notes reveiwed  Derm biopsy is not diagnostic of Rheumatologic pathology  patient has been taken off of long term steroids , given status of wounds  Daughter at bedside    Physical Exam:  Vital Signs Last 24 Hrs  T(C): 37.2 (30 May 2018 10:26), Max: 37.2 (30 May 2018 10:26)  T(F): 99 (30 May 2018 10:26), Max: 99 (30 May 2018 10:26)  HR: 89 (30 May 2018 10:26) (77 - 89)  BP: 135/72 (30 May 2018 10:26) (102/64 - 135/72)  BP(mean): --  RR: 18 (30 May 2018 10:26) (18 - 18)  SpO2: 99% (30 May 2018 10:26) (94% - 99%)    Patient is supine , using nasal O2 at bedrest  Significantly overweight  On a group 3 mattress    Affected wounds to VAC  Wet to dry dressing right knee wound    Wound of left lateral thigh redressed- significant purulent drainage, with increase in wound diameter to approx 2 cm, and depth of approx 4 cm  Wound debrided and specimen submitted  No cellulitis  Tolerates dressing change poorly, 2 md Dilaudid PO given   VAC change immanent    Left lateral thigh wound packed with Dakins gauze    Status d/w patient and daughter at bedside , and with  by phone         LABS:                        9.1    14.5  )-----------( 306      ( 30 May 2018 07:20 )             29.8     PT/INR - ( 30 May 2018 07:20 )   PT: 32.4 sec;   INR: 2.93 ratio SUBJECTIVE: Pt seen, chart reviewed.      Allergies    amoxicillin (Other)    Intolerances    IV Contrast (Flushing (Skin); Nausea)      aspirin enteric coated 81 milliGRAM(s) Oral daily  minocycline 100 milliGRAM(s) Oral two times a day  nystatin    Suspension 282720 Unit(s) Oral every 6 hours      Rerquested by plastics and family to re evaluate patient  Current notes reveiwed  Derm biopsy is not diagnostic of Pyoderma gangrenosa   patient has been taken off of long term steroids , given status of wounds  Daughter at bedside    Physical Exam:  Vital Signs Last 24 Hrs  T(C): 37.2 (30 May 2018 10:26), Max: 37.2 (30 May 2018 10:26)  T(F): 99 (30 May 2018 10:26), Max: 99 (30 May 2018 10:26)  HR: 89 (30 May 2018 10:26) (77 - 89)  BP: 135/72 (30 May 2018 10:26) (102/64 - 135/72)  BP(mean): --  RR: 18 (30 May 2018 10:26) (18 - 18)  SpO2: 99% (30 May 2018 10:26) (94% - 99%)    Non ambulatory  Patient is supine , using nasal O2 at bedrest  Significantly overweight  On a group 3 mattress    Affected wounds to VAC  Wet to dry dressing right knee wound    Wound of left lateral thigh redressed- significant purulent drainage, with increase in wound diameter to approx 2 cm, and depth of approx 4 cm  Wound debrided and specimen submitted  No cellulitis  Tolerates dressing change poorly, 2 md Dilaudid PO given   VAC change immanent    Left lateral thigh wound packed with Dakins gauze    Status d/w patient and daughter at bedside , and with  by phone         LABS:                        9.1    14.5  )-----------( 306      ( 30 May 2018 07:20 )             29.8     PT/INR - ( 30 May 2018 07:20 )   PT: 32.4 sec;   INR: 2.93 ratio

## 2018-05-30 NOTE — PROGRESS NOTE ADULT - SUBJECTIVE AND OBJECTIVE BOX
CC: f/u for rt knee infection    Patient reports: no specific complaints today    REVIEW OF SYSTEMS:  All other review of systems negative (Comprehensive ROS): she is in specialty bed    Antimicrobials Day #  :long term  minocycline 100 milliGRAM(s) Oral two times a day  nystatin    Suspension 762622 Unit(s) Oral every 6 hours    Other Medications Reviewed    T(F): 99 (05-30-18 @ 10:26), Max: 99 (05-30-18 @ 10:26)  HR: 89 (05-30-18 @ 10:26)  BP: 135/72 (05-30-18 @ 10:26)  RR: 18 (05-30-18 @ 10:26)  SpO2: 99% (05-30-18 @ 10:26)  Wt(kg): --    PHYSICAL EXAM:  General: alert, no acute distress  Eyes:  anicteric, no conjunctival injection, no discharge  Oropharynx: no lesions or injection 	  Neck: supple, without adenopathy  Lungs: clear to auscultation, diminished at bases  Heart: regular rate and rhythm; no murmur, rubs or gallops  Abdomen: soft, nondistended, nontender, without mass or organomegaly  Skin: wound vac in lwer abdomina nd Rt thigh  Extremities: no clubbing, cyanosis, + edema  Neurologic: alert, oriented, moves all extremities  Rt knee with unroofed eschar,no pus or cellulitis  Rt arm PICC exit clean  LAB RESULTS:                        9.1    14.5  )-----------( 306      ( 30 May 2018 07:20 )             29.8     05-30    137  |  95<L>  |  34<H>  ----------------------------<  111<H>  4.7   |  28  |  0.86    Ca    10.2      30 May 2018 07:19          MICROBIOLOGY:  RECENT CULTURES:      RADIOLOGY REVIEWED:

## 2018-05-30 NOTE — PROGRESS NOTE ADULT - SUBJECTIVE AND OBJECTIVE BOX
Patient is a 77y old  Female who presents with a chief complaint of fever (11 Apr 2018 14:25)      SUBJECTIVE / OVERNIGHT EVENTS:    Patient seen and examined. requested IV dilaudid today after wound changes. denies cp sob nvd.      Vital Signs Last 24 Hrs  T(C): 37.2 (30 May 2018 10:26), Max: 37.2 (30 May 2018 10:26)  T(F): 99 (30 May 2018 10:26), Max: 99 (30 May 2018 10:26)  HR: 89 (30 May 2018 10:26) (77 - 89)  BP: 135/72 (30 May 2018 10:26) (102/64 - 135/72)  BP(mean): --  RR: 18 (30 May 2018 10:26) (18 - 18)  SpO2: 99% (30 May 2018 10:26) (94% - 99%)  I&O's Summary    29 May 2018 07:01  -  30 May 2018 07:00  --------------------------------------------------------  IN: 480 mL / OUT: 700 mL / NET: -220 mL    30 May 2018 07:01  -  30 May 2018 13:35  --------------------------------------------------------  IN: 120 mL / OUT: 0 mL / NET: 120 mL        PE:  GENERAL: NAD, AAOx3  HEAD:  Atraumatic, Normocephalic  EYES: EOMI, PERRLA, conjunctiva and sclera clear  NECK: Supple, No JVD  CHEST/LUNG: CTABL, No wheeze  HEART: Regular rate and rhythm; no murmur  ABDOMEN: Soft, Nontender, Nondistended; Bowel sounds present, lower abd wound wound vac  EXTREMITIES:  2+ Peripheral Pulses, right thigh wound c wound vac, right knee debrided, left thigh small wound packed  SKIN: No rashes or lesions  NEURO: No focal deficits    LABS:                        9.1    14.5  )-----------( 306      ( 30 May 2018 07:20 )             29.8     05-30    137  |  95<L>  |  34<H>  ----------------------------<  111<H>  4.7   |  28  |  0.86    Ca    10.2      30 May 2018 07:19      PT/INR - ( 30 May 2018 07:20 )   PT: 32.4 sec;   INR: 2.93 ratio           CAPILLARY BLOOD GLUCOSE                RADIOLOGY & ADDITIONAL TESTS:    Imaging Personally Reviewed:  [x] YES  [ ] NO    Consultant(s) Notes Reviewed:  [x] YES  [ ] NO    MEDICATIONS  (STANDING):  ascorbic acid 500 milliGRAM(s) Oral daily  aspirin enteric coated 81 milliGRAM(s) Oral daily  buDESOnide 160 MICROgram(s)/formoterol 4.5 MICROgram(s) Inhaler 2 Puff(s) Inhalation two times a day  clotrimazole/betamethasone Cream 1 Application(s) Topical two times a day  docusate sodium 100 milliGRAM(s) Oral three times a day  epoetin jenni Injectable 51345 Unit(s) SubCutaneous every 7 days  ferrous    sulfate 325 milliGRAM(s) Oral daily  folic acid 1 milliGRAM(s) Oral daily  gabapentin 200 milliGRAM(s) Oral three times a day  melatonin 3 milliGRAM(s) Oral at bedtime  minocycline 100 milliGRAM(s) Oral two times a day  multivitamin 1 Tablet(s) Oral daily  mupirocin 2% Ointment 1 Application(s) Topical two times a day  nystatin    Suspension 128872 Unit(s) Oral every 6 hours  pantoprazole    Tablet 40 milliGRAM(s) Oral before breakfast  polyethylene glycol 3350 17 Gram(s) Oral daily  povidone iodine 10% Solution 1 Application(s) Topical every 12 hours  tiotropium 18 MICROgram(s) Capsule 1 Capsule(s) Inhalation daily  zinc oxide 20% Ointment 1 Application(s) Topical daily    MEDICATIONS  (PRN):  acetaminophen   Tablet 650 milliGRAM(s) Oral every 6 hours PRN For Temp greater than 38 C (100.4 F)  acetaminophen   Tablet. 650 milliGRAM(s) Oral every 6 hours PRN Mild Pain (1 - 3)  bisacodyl Suppository 10 milliGRAM(s) Rectal daily PRN Constipation  HYDROmorphone   Tablet 4 milliGRAM(s) Oral every 4 hours PRN Severe Pain (7 - 10)  HYDROmorphone   Tablet 2 milliGRAM(s) Oral every 4 hours PRN Moderate Pain (4 - 6)  lidocaine 2% Gel 1 Application(s) Topical daily PRN dressing change  lidocaine 2% Viscous 10 milliLiter(s) Swish and Spit every 6 hours PRN Mouth Sores  senna 2 Tablet(s) Oral at bedtime PRN Constipation      Care Discussed with Consultants/Other Providers [x] YES  [ ] NO    HEALTH ISSUES - PROBLEM Dx:  Skin ulcer, unspecified ulcer stage: Skin ulcer, unspecified ulcer stage  Pain: Pain  Adjustment disorder, unspecified type  Delirium due to another medical condition  Pneumonia: Pneumonia  Aortic stenosis, severe: Aortic stenosis, severe  MYAH (obstructive sleep apnea): MYAH (obstructive sleep apnea)  Obesity: Obesity  Asthma: Asthma  SOB (shortness of breath): SOB (shortness of breath)  Chronic deep vein thrombosis (DVT) of lower extremity, unspecified laterality, unspecified vein: Chronic deep vein thrombosis (DVT) of lower extremity, unspecified laterality, unspecified vein  Arthralgia of knee, unspecified laterality: Arthralgia of knee, unspecified laterality  Hyperlipidemia, unspecified hyperlipidemia type: Hyperlipidemia, unspecified hyperlipidemia type  Asthma, unspecified asthma severity, unspecified whether complicated, unspecified whether persistent: Asthma, unspecified asthma severity, unspecified whether complicated, unspecified whether persistent  Coronary artery disease involving native coronary artery of native heart without angina pectoris: Coronary artery disease involving native coronary artery of native heart without angina pectoris  Gastroesophageal reflux disease, esophagitis presence not specified: Gastroesophageal reflux disease, esophagitis presence not specified  Fever, unspecified fever cause: Fever, unspecified fever cause

## 2018-05-30 NOTE — PROGRESS NOTE ADULT - ASSESSMENT
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     # Recent septic Rt TKR - abx spacer exchanged and plastics complex wound closure  # PMR on chronic steroids  # right knee eschar, right thigh necrosis and abd wound   # Multiple skin ulcers  # Anemia chronic disease    Plan:  wound care and wound vac per plastics and ortho  s/p multiple debridements  multiple ulcerated wounds: Derm c/s appreciated-f/u punch bx Cx-ngtd and path negative for pyoderma gangrenosum  remains afebrile, cont suppressive minocycline  appreciate heme recs, started on procrit for anemia of chronic disease  cont coumadin and asa  f/u plastics recs  Pain control- transition to po-in preparation for discharge to rehab  PT OOB no knee motion, immobilizer if out of bed, has bariatric chair  bowel regimen      dw daughter at bedside 76 yo F PMHx including HLD, GERD, Anxiety, Anemia, CAD s/p HERBERT, severe AS (s/p TAVR & PPM 12/17 [employer] Model [id number]), 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     # Recent septic Rt TKR - abx spacer exchanged and plastics complex wound closure  # PMR on chronic steroids  # right knee eschar, right thigh necrosis and abd wound   # Multiple skin ulcers  # Anemia chronic disease    Plan:  wound care and wound vac per plastics and ortho  s/p multiple debridements  multiple ulcerated wounds: Derm c/s appreciated-f/u punch bx Cx-ngtd and path negative for pyoderma gangrenosum  remains afebrile, cont suppressive minocycline  appreciate heme recs, started on procrit for anemia of chronic disease  cont coumadin and asa  f/u plastics recs  Pain control- transition to po-in preparation for discharge to rehab  PT OOB no knee motion, immobilizer if out of bed, has bariatric chair  bowel regimen    DC planning once cleared by plastics and ortho  dw daughter at bedside who is in agreement with plan of care  all questions answered to the best of my ability

## 2018-05-30 NOTE — PROGRESS NOTE ADULT - SUBJECTIVE AND OBJECTIVE BOX
Pt is a 77 year old female who was admitted several weeks ago with aspiration pneumonia and fever with right knee pain. She also had proximal thigh swelling and there was a hematoma of the leg as well. She has a history of PMR on steroids. She also has a history of tavr, AICD andf remote bilateral TKR and was found to have strep bacteremia and a ERIKA was negative and she also had a eschar of the right knee as well. She was placed on iv antibiotics and I understand that she will be completing the antibiotics, daptomycin on this date. She told me that she had anemia in the distant past and was placed on antibiotics. She is being seen by the plastics service as well. A ct of th femur was not remarkable for infection and a ct of the chest done several weeks ago showed bibasilar PNA. She was also noted to have a left renal lesion that is unchanged now since 12/2016.     The consult was requested to see if there is anything we can do for her anemia. The counts on the day of this admission was white cell count of 9.71 hemoglobin 8.4 hematocrit 26.4 MCV 86 and MCHC 31.8 and platelet count of 936769 the diff count was 74 polys 15 lymphs and 7 monos the bun was 9 and the creatinine was 0.6     5/4 the pt is stable and the iron studies are still pending at this time. The hemoglobin was 8.4. 5/7 the pt was seen and the notes over charu last few days. The pt will in the am have ferritin and iron studies sent off. 5/8 the ferritin came back as over 300 and therefore she is not iron deficient and the anemia is consistent with chronic disease. The use of epogen might be considered here if hemoglobin drops and can be implemented to reduce blood transfusions. 5/9 pt clearly looks better and repeat hemoglobin was stable at 8.1  PE weak appearing with female and needed help to do normal daily activities large eschar as described on the lower leg and at the time of my arrival, the wound vac was in place and she was being cared for by the staff.  5/8 the ferritin came back at over 300 and therefore she is not iron deficient and epo can be considered if the hemoglobin drops and is in need of blood support. 5/10 pt being evaluated for rehab and consider epo of hemoglobin drops below the 8 range. 5/11 notes reviewed and will order cbc on the weekend and decide on epo.  pmh as above in the body of the report  5/14 the hemoglobin remains at 8.1 and debridement and wound vac and abdominal wound and thigh wounds were being cared for. 5/15 stable clinically and looks better the hemoglobin without epo is up to 8.5 5/16 notes and labs reviewed and no change from heme point of view.     5/17 hemoglobin down to 8.1 and stable clinically. 5/19 called by Dr Marrero as the pt had a hemoglobin of 7.3 and procrit will be started at [place] 90727 units a week in an attempt to reduce transfusion requirements. 5/21 hemoglobin 7.5 and stable on procrit now notes reviewed. 5/30 stable and being cared for by the staff, the hemoglobin was noted to be 9.1.  zinc oxide 20% Ointment 1 Application(s) Topical daily   PE being cared for by the staff and appeared to be stable.     Vital Signs Last 24 Hrs  T(C): 37.2 (30 May 2018 10:26), Max: 37.2 (30 May 2018 10:26)  T(F): 99 (30 May 2018 10:26), Max: 99 (30 May 2018 10:26)  HR: 89 (30 May 2018 10:26) (77 - 89)  BP: 135/72 (30 May 2018 10:26) (102/64 - 135/72)  BP(mean): --  RR: 18 (30 May 2018 10:26) (18 - 18)  SpO2: 99% (30 May 2018 10:26) (94% - 99%)      MEDICATIONS  (STANDING):  ascorbic acid 500 milliGRAM(s) Oral daily  aspirin enteric coated 81 milliGRAM(s) Oral daily  buDESOnide 160 MICROgram(s)/formoterol 4.5 MICROgram(s) Inhaler 2 Puff(s) Inhalation two times a day  clotrimazole/betamethasone Cream 1 Application(s) Topical two times a day  docusate sodium 100 milliGRAM(s) Oral three times a day  epoetin jenni Injectable 94063 Unit(s) SubCutaneous every 7 days  ferrous    sulfate 325 milliGRAM(s) Oral daily  folic acid 1 milliGRAM(s) Oral daily  gabapentin 200 milliGRAM(s) Oral three times a day  melatonin 3 milliGRAM(s) Oral at bedtime  minocycline 100 milliGRAM(s) Oral two times a day  multivitamin 1 Tablet(s) Oral daily  mupirocin 2% Ointment 1 Application(s) Topical two times a day  nystatin    Suspension 498179 Unit(s) Oral every 6 hours  pantoprazole    Tablet 40 milliGRAM(s) Oral before breakfast  polyethylene glycol 3350 17 Gram(s) Oral daily  povidone iodine 10% Solution 1 Application(s) Topical every 12 hours  tiotropium 18 MICROgram(s) Capsule 1 Capsule(s) Inhalation daily  zinc oxide 20% Ointment 1 Application(s) Topical daily  PE stable and three people stafff caring for pt at the time of my arrival                      9.1    14.5  )-----------( 306      ( 30 May 2018 07:20 )             29.8

## 2018-05-30 NOTE — PROGRESS NOTE ADULT - ASSESSMENT
77 year old female s/p rudy from right knee and spacer for strept infection then returned with nonhealing wound and had spacer change, fusion ruthann, plastic closure and grew mrsa.   Wound developed necrotic skin, then pneumonia, and hematoma over the right thigh with necrosis of skin too.   Finished full course of iv abx, on suppressive minocycline for the knee. Pneumonia treated.  Debrided thigh and abdomen wound with VAC.  Dermatology performed 4mm punch bx performed of R thigh- cx grew enterobacter and morganella, suggestive of colonizing roopa in a patient with prolonged hospital stay,   Path as reviewed above- inflammatory infiltrate, no features of pyoderma in sample  Afebrile, stable WBC, mild leukocytosis seems nonspecific at this time.    Plan:   Continue suppressive minocycline   Local wound care likely to require most time/intervention, and remain most debilitating  R knee eschar partially removed, no gross infection

## 2018-05-30 NOTE — PROGRESS NOTE ADULT - ATTENDING COMMENTS
Patient seen and examined   Eschar debrided base with granulation tissue continue wet to dry  VAC will be changed by PT and connected to one machine with y connector  agree with above

## 2018-05-30 NOTE — PROGRESS NOTE ADULT - ASSESSMENT
A: 77F with complex PMH/PSH who recently had placement of a right knee antibiotic spacer followed by PRS closure c/b skin necrosis and formation of an eschar over the anterior knee joint. The patient was readmitted with fevers and right thigh cellulitis which was complicated by skin breakdown. Patient also developed wound at the right/mid lower quadrant of the abdomen.    P:  >> Dressing change TID to knee  >> Betadine to eschar bid  >> VACs on abdomen and R thigh to be changed today  >> No active Rheum. issues- continue to hold steroids  >> Derm- ulcers, not pyoderma gangrenosum  >> Care per primary team.  >> DVT prophylaxis.  >> Protein supplementation of diet to encourage wound healing  >> Cleared for discharge from Plastic Surgery perspective    [facility] Plastic Surgery Pager -- [phone]  [facility] Plastic Surgery Pager -- [phone]

## 2018-05-30 NOTE — PROGRESS NOTE ADULT - SUBJECTIVE AND OBJECTIVE BOX
PLASTIC SURGERY:    S: Patient seen and examined. No acute events overnight.    O:  Vital Signs Last 24 Hrs  T(C): 36.8 (30 May 2018 05:49), Max: 37.1 (29 May 2018 18:52)  T(F): 98.3 (30 May 2018 05:49), Max: 98.8 (29 May 2018 20:45)  HR: 77 (30 May 2018 05:49) (77 - 88)  BP: 115/67 (30 May 2018 05:49) (102/64 - 144/85)  BP(mean): --  RR: 18 (30 May 2018 05:49) (18 - 18)  SpO2: 96% (30 May 2018 05:49) (92% - 96%)    I&O's Detail    29 May 2018 07:01  -  30 May 2018 07:00  --------------------------------------------------------  IN:    Oral Fluid: 480 mL  Total IN: 480 mL    OUT:    Voided: 700 mL  Total OUT: 700 mL    Total NET: -220 mL    MEDICATIONS  (STANDING):  ascorbic acid 500 milliGRAM(s) Oral daily  aspirin enteric coated 81 milliGRAM(s) Oral daily  buDESOnide 160 MICROgram(s)/formoterol 4.5 MICROgram(s) Inhaler 2 Puff(s) Inhalation two times a day  clotrimazole/betamethasone Cream 1 Application(s) Topical two times a day  docusate sodium 100 milliGRAM(s) Oral three times a day  epoetin jenni Injectable 49948 Unit(s) SubCutaneous every 7 days  ferrous    sulfate 325 milliGRAM(s) Oral daily  folic acid 1 milliGRAM(s) Oral daily  gabapentin 200 milliGRAM(s) Oral three times a day  melatonin 3 milliGRAM(s) Oral at bedtime  minocycline 100 milliGRAM(s) Oral two times a day  multivitamin 1 Tablet(s) Oral daily  mupirocin 2% Ointment 1 Application(s) Topical two times a day  nystatin    Suspension 471715 Unit(s) Oral every 6 hours  pantoprazole    Tablet 40 milliGRAM(s) Oral before breakfast  polyethylene glycol 3350 17 Gram(s) Oral daily  povidone iodine 10% Solution 1 Application(s) Topical every 12 hours  tiotropium 18 MICROgram(s) Capsule 1 Capsule(s) Inhalation daily  zinc oxide 20% Ointment 1 Application(s) Topical daily    MEDICATIONS  (PRN):  acetaminophen   Tablet 650 milliGRAM(s) Oral every 6 hours PRN For Temp greater than 38 C (100.4 F)  acetaminophen   Tablet. 650 milliGRAM(s) Oral every 6 hours PRN Mild Pain (1 - 3)  bisacodyl Suppository 10 milliGRAM(s) Rectal daily PRN Constipation  HYDROmorphone   Tablet 4 milliGRAM(s) Oral every 4 hours PRN Severe Pain (7 - 10)  HYDROmorphone   Tablet 2 milliGRAM(s) Oral every 4 hours PRN Moderate Pain (4 - 6)  lidocaine 2% Gel 1 Application(s) Topical daily PRN dressing change  lidocaine 2% Viscous 10 milliLiter(s) Swish and Spit every 6 hours PRN Mouth Sores  senna 2 Tablet(s) Oral at bedtime PRN Constipation                        9.1    14.5  )-----------( 306      ( 30 May 2018 07:20 )             29.8   05-30    137  |  95<L>  |  34<H>  ----------------------------<  111<H>  4.7   |  28  |  0.86    Ca    10.2      30 May 2018 07:19    Physical Exam:  Gen: Laying in bed, NAD, alert and oriented.   RLE knee wound with eschar peeling off, granulation tissue at base, Wet-to-dry dressing placed. Proximal RLE and abdominal wound vacs in place.    Lines:   IV: patent, in place.

## 2018-05-31 LAB
BASOPHILS # BLD AUTO: 0.1 K/UL — SIGNIFICANT CHANGE UP (ref 0–0.2)
BASOPHILS NFR BLD AUTO: 0.6 % — SIGNIFICANT CHANGE UP (ref 0–2)
EOSINOPHIL # BLD AUTO: 0.4 K/UL — SIGNIFICANT CHANGE UP (ref 0–0.5)
EOSINOPHIL NFR BLD AUTO: 3.2 % — SIGNIFICANT CHANGE UP (ref 0–6)
HCT VFR BLD CALC: 27.9 % — LOW (ref 34.5–45)
HGB BLD-MCNC: 9 G/DL — LOW (ref 11.5–15.5)
INR BLD: 3.64 RATIO — HIGH (ref 0.88–1.16)
LYMPHOCYTES # BLD AUTO: 19.5 % — SIGNIFICANT CHANGE UP (ref 13–44)
LYMPHOCYTES # BLD AUTO: 2.4 K/UL — SIGNIFICANT CHANGE UP (ref 1–3.3)
MCHC RBC-ENTMCNC: 28.8 PG — SIGNIFICANT CHANGE UP (ref 27–34)
MCHC RBC-ENTMCNC: 32.2 GM/DL — SIGNIFICANT CHANGE UP (ref 32–36)
MCV RBC AUTO: 89.4 FL — SIGNIFICANT CHANGE UP (ref 80–100)
MONOCYTES # BLD AUTO: 1.1 K/UL — HIGH (ref 0–0.9)
MONOCYTES NFR BLD AUTO: 9 % — SIGNIFICANT CHANGE UP (ref 2–14)
NEUTROPHILS # BLD AUTO: 8.5 K/UL — HIGH (ref 1.8–7.4)
NEUTROPHILS NFR BLD AUTO: 67.9 % — SIGNIFICANT CHANGE UP (ref 43–77)
PLATELET # BLD AUTO: 266 K/UL — SIGNIFICANT CHANGE UP (ref 150–400)
PROTHROM AB SERPL-ACNC: 40.7 SEC — HIGH (ref 9.8–12.7)
RBC # BLD: 3.13 M/UL — LOW (ref 3.8–5.2)
RBC # FLD: 17.4 % — HIGH (ref 10.3–14.5)
WBC # BLD: 12.5 K/UL — HIGH (ref 3.8–10.5)
WBC # FLD AUTO: 12.5 K/UL — HIGH (ref 3.8–10.5)

## 2018-05-31 RX ORDER — SODIUM HYPOCHLORITE 0.125 %
1 SOLUTION, NON-ORAL MISCELLANEOUS
Qty: 0 | Refills: 0 | COMMUNITY
Start: 2018-05-31

## 2018-05-31 RX ORDER — TIOTROPIUM BROMIDE 18 UG/1
1 CAPSULE ORAL; RESPIRATORY (INHALATION)
Qty: 0 | Refills: 0 | COMMUNITY
Start: 2018-05-31

## 2018-05-31 RX ORDER — MINOCYCLINE HYDROCHLORIDE 45 MG/1
1 TABLET, EXTENDED RELEASE ORAL
Qty: 0 | Refills: 0 | COMMUNITY
Start: 2018-05-31

## 2018-05-31 RX ORDER — ZINC OXIDE 200 MG/G
1 OINTMENT TOPICAL
Qty: 0 | Refills: 0 | COMMUNITY
Start: 2018-05-31

## 2018-05-31 RX ORDER — POVIDONE-IODINE 5 %
1 AEROSOL (ML) TOPICAL
Qty: 0 | Refills: 0 | COMMUNITY
Start: 2018-05-31

## 2018-05-31 RX ORDER — HYDROMORPHONE HYDROCHLORIDE 2 MG/ML
1 INJECTION INTRAMUSCULAR; INTRAVENOUS; SUBCUTANEOUS
Qty: 0 | Refills: 0 | COMMUNITY
Start: 2018-05-31

## 2018-05-31 RX ORDER — MUPIROCIN 20 MG/G
1 OINTMENT TOPICAL
Qty: 0 | Refills: 0 | COMMUNITY
Start: 2018-05-31

## 2018-05-31 RX ORDER — ACETAMINOPHEN 500 MG
2 TABLET ORAL
Qty: 0 | Refills: 0 | COMMUNITY
Start: 2018-05-31

## 2018-05-31 RX ORDER — CLOTRIMAZOLE AND BETAMETHASONE DIPROPIONATE 10; .5 MG/G; MG/G
1 CREAM TOPICAL
Qty: 0 | Refills: 0 | COMMUNITY
Start: 2018-05-31

## 2018-05-31 RX ORDER — GABAPENTIN 400 MG/1
2 CAPSULE ORAL
Qty: 0 | Refills: 0 | COMMUNITY
Start: 2018-05-31

## 2018-05-31 RX ORDER — LIDOCAINE 4 G/100G
1 CREAM TOPICAL
Qty: 0 | Refills: 0 | COMMUNITY
Start: 2018-05-31

## 2018-05-31 RX ORDER — NYSTATIN 500MM UNIT
5 POWDER (EA) MISCELLANEOUS
Qty: 0 | Refills: 0 | COMMUNITY
Start: 2018-05-31

## 2018-05-31 RX ADMIN — Medication 500000 UNIT(S): at 11:22

## 2018-05-31 RX ADMIN — MINOCYCLINE HYDROCHLORIDE 100 MILLIGRAM(S): 45 TABLET, EXTENDED RELEASE ORAL at 17:00

## 2018-05-31 RX ADMIN — POLYETHYLENE GLYCOL 3350 17 GRAM(S): 17 POWDER, FOR SOLUTION ORAL at 11:22

## 2018-05-31 RX ADMIN — ZINC OXIDE 1 APPLICATION(S): 200 OINTMENT TOPICAL at 11:23

## 2018-05-31 RX ADMIN — Medication 1 APPLICATION(S): at 06:56

## 2018-05-31 RX ADMIN — Medication 1 APPLICATION(S): at 17:03

## 2018-05-31 RX ADMIN — BUDESONIDE AND FORMOTEROL FUMARATE DIHYDRATE 2 PUFF(S): 160; 4.5 AEROSOL RESPIRATORY (INHALATION) at 06:53

## 2018-05-31 RX ADMIN — Medication 650 MILLIGRAM(S): at 06:36

## 2018-05-31 RX ADMIN — Medication 81 MILLIGRAM(S): at 11:21

## 2018-05-31 RX ADMIN — Medication 100 MILLIGRAM(S): at 06:55

## 2018-05-31 RX ADMIN — Medication 500 MILLIGRAM(S): at 13:02

## 2018-05-31 RX ADMIN — TIOTROPIUM BROMIDE 1 CAPSULE(S): 18 CAPSULE ORAL; RESPIRATORY (INHALATION) at 11:23

## 2018-05-31 RX ADMIN — Medication 1 MILLIGRAM(S): at 11:21

## 2018-05-31 RX ADMIN — Medication 1 APPLICATION(S): at 11:21

## 2018-05-31 RX ADMIN — Medication 500000 UNIT(S): at 06:55

## 2018-05-31 RX ADMIN — HYDROMORPHONE HYDROCHLORIDE 2 MILLIGRAM(S): 2 INJECTION INTRAMUSCULAR; INTRAVENOUS; SUBCUTANEOUS at 17:00

## 2018-05-31 RX ADMIN — MUPIROCIN 1 APPLICATION(S): 20 OINTMENT TOPICAL at 06:55

## 2018-05-31 RX ADMIN — HYDROMORPHONE HYDROCHLORIDE 2 MILLIGRAM(S): 2 INJECTION INTRAMUSCULAR; INTRAVENOUS; SUBCUTANEOUS at 17:57

## 2018-05-31 RX ADMIN — PANTOPRAZOLE SODIUM 40 MILLIGRAM(S): 20 TABLET, DELAYED RELEASE ORAL at 06:55

## 2018-05-31 RX ADMIN — HYDROMORPHONE HYDROCHLORIDE 2 MILLIGRAM(S): 2 INJECTION INTRAMUSCULAR; INTRAVENOUS; SUBCUTANEOUS at 04:22

## 2018-05-31 RX ADMIN — HYDROMORPHONE HYDROCHLORIDE 2 MILLIGRAM(S): 2 INJECTION INTRAMUSCULAR; INTRAVENOUS; SUBCUTANEOUS at 03:22

## 2018-05-31 RX ADMIN — HYDROMORPHONE HYDROCHLORIDE 2 MILLIGRAM(S): 2 INJECTION INTRAMUSCULAR; INTRAVENOUS; SUBCUTANEOUS at 11:21

## 2018-05-31 RX ADMIN — Medication 650 MILLIGRAM(S): at 05:36

## 2018-05-31 RX ADMIN — CLOTRIMAZOLE AND BETAMETHASONE DIPROPIONATE 1 APPLICATION(S): 10; .5 CREAM TOPICAL at 06:55

## 2018-05-31 RX ADMIN — GABAPENTIN 200 MILLIGRAM(S): 400 CAPSULE ORAL at 22:33

## 2018-05-31 RX ADMIN — CLOTRIMAZOLE AND BETAMETHASONE DIPROPIONATE 1 APPLICATION(S): 10; .5 CREAM TOPICAL at 17:02

## 2018-05-31 RX ADMIN — MINOCYCLINE HYDROCHLORIDE 100 MILLIGRAM(S): 45 TABLET, EXTENDED RELEASE ORAL at 06:55

## 2018-05-31 RX ADMIN — Medication 100 MILLIGRAM(S): at 22:33

## 2018-05-31 RX ADMIN — GABAPENTIN 200 MILLIGRAM(S): 400 CAPSULE ORAL at 13:03

## 2018-05-31 RX ADMIN — Medication 325 MILLIGRAM(S): at 11:21

## 2018-05-31 RX ADMIN — Medication 500000 UNIT(S): at 22:40

## 2018-05-31 RX ADMIN — Medication 3 MILLIGRAM(S): at 22:33

## 2018-05-31 RX ADMIN — BUDESONIDE AND FORMOTEROL FUMARATE DIHYDRATE 2 PUFF(S): 160; 4.5 AEROSOL RESPIRATORY (INHALATION) at 17:00

## 2018-05-31 RX ADMIN — Medication 500000 UNIT(S): at 17:00

## 2018-05-31 RX ADMIN — Medication 100 MILLIGRAM(S): at 13:03

## 2018-05-31 RX ADMIN — Medication 1 TABLET(S): at 11:21

## 2018-05-31 RX ADMIN — GABAPENTIN 200 MILLIGRAM(S): 400 CAPSULE ORAL at 06:55

## 2018-05-31 RX ADMIN — MUPIROCIN 1 APPLICATION(S): 20 OINTMENT TOPICAL at 17:02

## 2018-05-31 RX ADMIN — HYDROMORPHONE HYDROCHLORIDE 2 MILLIGRAM(S): 2 INJECTION INTRAMUSCULAR; INTRAVENOUS; SUBCUTANEOUS at 12:21

## 2018-05-31 NOTE — PROGRESS NOTE ADULT - SUBJECTIVE AND OBJECTIVE BOX
Patient is a 77y old  Female who presents with a chief complaint of fever (11 Apr 2018 14:25)      SUBJECTIVE / OVERNIGHT EVENTS:    Patient seen and examined. denies CP SOB. has visitors, in good spirits.      Vital Signs Last 24 Hrs  T(C): 36.7 (31 May 2018 04:51), Max: 37.4 (30 May 2018 20:34)  T(F): 98.1 (31 May 2018 04:51), Max: 99.4 (30 May 2018 20:34)  HR: 79 (31 May 2018 04:51) (79 - 83)  BP: 115/74 (31 May 2018 04:51) (100/53 - 115/74)  BP(mean): --  RR: 18 (31 May 2018 04:51) (16 - 19)  SpO2: 99% (31 May 2018 04:51) (95% - 99%)  I&O's Summary    30 May 2018 07:01  -  31 May 2018 07:00  --------------------------------------------------------  IN: 840 mL / OUT: 125 mL / NET: 715 mL        PE:  GENERAL: NAD, AAOx3  HEAD:  Atraumatic, Normocephalic  EYES: EOMI, PERRLA, conjunctiva and sclera clear  NECK: Supple, No JVD  CHEST/LUNG: CTABL, No wheeze  HEART: Regular rate and rhythm; no murmur  ABDOMEN: Soft, Nontender, Nondistended; Bowel sounds present, lower abd wound wound vac  EXTREMITIES:  2+ Peripheral Pulses, right thigh wound c wound vac, right knee debrided, left thigh small wound packed  SKIN: No rashes or lesions  NEURO: No focal deficits      LABS:                        9.0    12.5  )-----------( 266      ( 31 May 2018 05:35 )             27.9     05-30    137  |  95<L>  |  34<H>  ----------------------------<  111<H>  4.7   |  28  |  0.86    Ca    10.2      30 May 2018 07:19      PT/INR - ( 31 May 2018 05:35 )   PT: 40.7 sec;   INR: 3.64 ratio           CAPILLARY BLOOD GLUCOSE                RADIOLOGY & ADDITIONAL TESTS:    Imaging Personally Reviewed:  [x] YES  [ ] NO    Consultant(s) Notes Reviewed:  [x] YES  [ ] NO    MEDICATIONS  (STANDING):  ascorbic acid 500 milliGRAM(s) Oral daily  aspirin enteric coated 81 milliGRAM(s) Oral daily  buDESOnide 160 MICROgram(s)/formoterol 4.5 MICROgram(s) Inhaler 2 Puff(s) Inhalation two times a day  clotrimazole/betamethasone Cream 1 Application(s) Topical two times a day  Dakins Solution - 1/4 Strength 1 Application(s) Topical daily  docusate sodium 100 milliGRAM(s) Oral three times a day  epoetin jenni Injectable 33143 Unit(s) SubCutaneous every 7 days  ferrous    sulfate 325 milliGRAM(s) Oral daily  folic acid 1 milliGRAM(s) Oral daily  gabapentin 200 milliGRAM(s) Oral three times a day  melatonin 3 milliGRAM(s) Oral at bedtime  minocycline 100 milliGRAM(s) Oral two times a day  multivitamin 1 Tablet(s) Oral daily  mupirocin 2% Ointment 1 Application(s) Topical two times a day  nystatin    Suspension 923751 Unit(s) Oral every 6 hours  pantoprazole    Tablet 40 milliGRAM(s) Oral before breakfast  polyethylene glycol 3350 17 Gram(s) Oral daily  povidone iodine 10% Solution 1 Application(s) Topical every 12 hours  tiotropium 18 MICROgram(s) Capsule 1 Capsule(s) Inhalation daily  zinc oxide 20% Ointment 1 Application(s) Topical daily    MEDICATIONS  (PRN):  acetaminophen   Tablet 650 milliGRAM(s) Oral every 6 hours PRN For Temp greater than 38 C (100.4 F)  acetaminophen   Tablet. 650 milliGRAM(s) Oral every 6 hours PRN Mild Pain (1 - 3)  bisacodyl Suppository 10 milliGRAM(s) Rectal daily PRN Constipation  HYDROmorphone   Tablet 4 milliGRAM(s) Oral every 4 hours PRN Severe Pain (7 - 10)  HYDROmorphone   Tablet 2 milliGRAM(s) Oral every 4 hours PRN Moderate Pain (4 - 6)  lidocaine 2% Gel 1 Application(s) Topical daily PRN dressing change  lidocaine 2% Viscous 10 milliLiter(s) Swish and Spit every 6 hours PRN Mouth Sores  senna 2 Tablet(s) Oral at bedtime PRN Constipation      Care Discussed with Consultants/Other Providers [x] YES  [ ] NO    HEALTH ISSUES - PROBLEM Dx:  Skin ulcer, unspecified ulcer stage: Skin ulcer, unspecified ulcer stage  Pain: Pain  Adjustment disorder, unspecified type  Delirium due to another medical condition  Pneumonia: Pneumonia  Aortic stenosis, severe: Aortic stenosis, severe  MYAH (obstructive sleep apnea): MYAH (obstructive sleep apnea)  Obesity: Obesity  Asthma: Asthma  SOB (shortness of breath): SOB (shortness of breath)  Chronic deep vein thrombosis (DVT) of lower extremity, unspecified laterality, unspecified vein: Chronic deep vein thrombosis (DVT) of lower extremity, unspecified laterality, unspecified vein  Arthralgia of knee, unspecified laterality: Arthralgia of knee, unspecified laterality  Hyperlipidemia, unspecified hyperlipidemia type: Hyperlipidemia, unspecified hyperlipidemia type  Asthma, unspecified asthma severity, unspecified whether complicated, unspecified whether persistent: Asthma, unspecified asthma severity, unspecified whether complicated, unspecified whether persistent  Coronary artery disease involving native coronary artery of native heart without angina pectoris: Coronary artery disease involving native coronary artery of native heart without angina pectoris  Gastroesophageal reflux disease, esophagitis presence not specified: Gastroesophageal reflux disease, esophagitis presence not specified  Fever, unspecified fever cause: Fever, unspecified fever cause

## 2018-05-31 NOTE — PROGRESS NOTE ADULT - ASSESSMENT
A: 77F with complex PMH/PSH who recently had placement of a right knee antibiotic spacer followed by PRS closure c/b skin necrosis and formation of an eschar over the anterior knee joint. The patient was readmitted with fevers and right thigh cellulitis which was complicated by skin breakdown. Patient also developed wound at the right/mid lower quadrant of the abdomen.    P:  >> Dressing change TID to knee  >> Betadine to eschar bid  >> VACs on abdomen and R thigh to be connected via a Y-connector with change tomorrow  >> No active Rheum. issues- continue to hold steroids  >> Derm- ulcers, not pyoderma gangrenosum  >> Care per primary team.  >> DVT prophylaxis.  >> Protein supplementation of diet to encourage wound healing  >> Cleared for discharge from Plastic Surgery perspective    [facility] Plastic Surgery Pager -- [phone]  [facility] Plastic Surgery Pager -- [phone]

## 2018-05-31 NOTE — PROGRESS NOTE ADULT - SUBJECTIVE AND OBJECTIVE BOX
PLASTIC SURGERY:    S: Patient seen and examined. Yesterday, some of her knee eschar was debrided.    O:  Vital Signs Last 24 Hrs  T(C): 36.7 (31 May 2018 04:51), Max: 37.4 (30 May 2018 20:34)  T(F): 98.1 (31 May 2018 04:51), Max: 99.4 (30 May 2018 20:34)  HR: 79 (31 May 2018 04:51) (79 - 89)  BP: 115/74 (31 May 2018 04:51) (100/53 - 135/72)  BP(mean): --  RR: 18 (31 May 2018 04:51) (16 - 19)  SpO2: 99% (31 May 2018 04:51) (95% - 99%)    I&O's Detail    30 May 2018 07:01  -  31 May 2018 07:00  --------------------------------------------------------  IN:    Oral Fluid: 600 mL  Total IN: 600 mL    OUT:    Voided: 125 mL  Total OUT: 125 mL    Total NET: 475 mL    MEDICATIONS  (STANDING):  ascorbic acid 500 milliGRAM(s) Oral daily  aspirin enteric coated 81 milliGRAM(s) Oral daily  buDESOnide 160 MICROgram(s)/formoterol 4.5 MICROgram(s) Inhaler 2 Puff(s) Inhalation two times a day  clotrimazole/betamethasone Cream 1 Application(s) Topical two times a day  Dakins Solution - 1/4 Strength 1 Application(s) Topical daily  docusate sodium 100 milliGRAM(s) Oral three times a day  epoetin jenni Injectable 96096 Unit(s) SubCutaneous every 7 days  ferrous    sulfate 325 milliGRAM(s) Oral daily  folic acid 1 milliGRAM(s) Oral daily  gabapentin 200 milliGRAM(s) Oral three times a day  melatonin 3 milliGRAM(s) Oral at bedtime  minocycline 100 milliGRAM(s) Oral two times a day  multivitamin 1 Tablet(s) Oral daily  mupirocin 2% Ointment 1 Application(s) Topical two times a day  nystatin    Suspension 310827 Unit(s) Oral every 6 hours  pantoprazole    Tablet 40 milliGRAM(s) Oral before breakfast  polyethylene glycol 3350 17 Gram(s) Oral daily  povidone iodine 10% Solution 1 Application(s) Topical every 12 hours  tiotropium 18 MICROgram(s) Capsule 1 Capsule(s) Inhalation daily  zinc oxide 20% Ointment 1 Application(s) Topical daily    MEDICATIONS  (PRN):  acetaminophen   Tablet 650 milliGRAM(s) Oral every 6 hours PRN For Temp greater than 38 C (100.4 F)  acetaminophen   Tablet. 650 milliGRAM(s) Oral every 6 hours PRN Mild Pain (1 - 3)  bisacodyl Suppository 10 milliGRAM(s) Rectal daily PRN Constipation  HYDROmorphone   Tablet 4 milliGRAM(s) Oral every 4 hours PRN Severe Pain (7 - 10)  HYDROmorphone   Tablet 2 milliGRAM(s) Oral every 4 hours PRN Moderate Pain (4 - 6)  lidocaine 2% Gel 1 Application(s) Topical daily PRN dressing change  lidocaine 2% Viscous 10 milliLiter(s) Swish and Spit every 6 hours PRN Mouth Sores  senna 2 Tablet(s) Oral at bedtime PRN Constipation                     9.0    12.5  )-----------( 266      ( 31 May 2018 05:35 )             27.9     05-30    137  |  95<L>  |  34<H>  ----------------------------<  111<H>  4.7   |  28  |  0.86    Ca    10.2      30 May 2018 07:19    Physical Exam:  Gen: Laying in bed, NAD, alert and oriented.   RLE knee wound with eschar, granulation tissue at base, Wet-to-dry dressing placed. Proximal RLE and abdominal wound vacs in place.    Lines:   IV: patent, in place.

## 2018-05-31 NOTE — PROGRESS NOTE ADULT - SUBJECTIVE AND OBJECTIVE BOX
pain controlled, afebrile  oob to chair yesterday and today    abdominal wound - vac in place  RLE  thigh vac in place  surgical wound intact, eschar removed yesterday by prs, fibrinous base now with wet to dry, no exposed hardware, no exudate, dressed by prs/wound care  5/5 ta/ehl/gcs  silt l4-s1  2+ dp pulse

## 2018-05-31 NOTE — PROGRESS NOTE ADULT - ASSESSMENT
local wound care to knee and vac therapy to abd/thigh per plastics and wound care teams  PO abx per ID team  encourage patient to spend majority of bed oob in bedside chair as able  PT to help mobilize, she must remain 50% wb on the RLE and NO KNEE MOTION allowed, knee immobilizer if oob  coumadin, inr goal 2-3, therapeutic  continue to optimize nutrition  continue to involve psych	  dispo planning for dc to snf	    Discussed the above with Mr. Rdz (on phone) and Mrs. Rdz. Both are pleased.

## 2018-05-31 NOTE — CHART NOTE - NSCHARTNOTEFT_GEN_A_CORE
Follow up. patient eating well, accepts james x2 daily, nepro 2x daily, prosource protein supple,ments. Patient feels well.  Source: Patient [X ]    Family [ ]     other [ ]    Diet : Low sodium, soft      Patient reports   [X ] other: no complaints     PO intake:   50-75% [X ]    [X ]  other : plus supplements     Source for PO intake [X ] Patient [ ] family [ ] chart [ X] staff [X ] other private  [facility]     Enteral /Parenteral Nutrition: NA      Current Weight: 106.5  kg   Dosing Weight 117kg   desirable weight loss     Pertinent Medications: MEDICATIONS  (STANDING):  ascorbic acid 500 milliGRAM(s) Oral daily  aspirin enteric coated 81 milliGRAM(s) Oral daily  buDESOnide 160 MICROgram(s)/formoterol 4.5 MICROgram(s) Inhaler 2 Puff(s) Inhalation two times a day  clotrimazole/betamethasone Cream 1 Application(s) Topical two times a day  Dakins Solution - 1/4 Strength 1 Application(s) Topical daily  docusate sodium 100 milliGRAM(s) Oral three times a day  epoetin jenni Injectable 09863 Unit(s) SubCutaneous every 7 days  ferrous    sulfate 325 milliGRAM(s) Oral daily  folic acid 1 milliGRAM(s) Oral daily  gabapentin 200 milliGRAM(s) Oral three times a day  melatonin 3 milliGRAM(s) Oral at bedtime  minocycline 100 milliGRAM(s) Oral two times a day  multivitamin 1 Tablet(s) Oral daily  mupirocin 2% Ointment 1 Application(s) Topical two times a day  nystatin    Suspension 166607 Unit(s) Oral every 6 hours  pantoprazole    Tablet 40 milliGRAM(s) Oral before breakfast  polyethylene glycol 3350 17 Gram(s) Oral daily  povidone iodine 10% Solution 1 Application(s) Topical every 12 hours  tiotropium 18 MICROgram(s) Capsule 1 Capsule(s) Inhalation daily  zinc oxide 20% Ointment 1 Application(s) Topical daily    MEDICATIONS  (PRN):  acetaminophen   Tablet 650 milliGRAM(s) Oral every 6 hours PRN For Temp greater than 38 C (100.4 F)  acetaminophen   Tablet. 650 milliGRAM(s) Oral every 6 hours PRN Mild Pain (1 - 3)  bisacodyl Suppository 10 milliGRAM(s) Rectal daily PRN Constipation  HYDROmorphone   Tablet 4 milliGRAM(s) Oral every 4 hours PRN Severe Pain (7 - 10)  HYDROmorphone   Tablet 2 milliGRAM(s) Oral every 4 hours PRN Moderate Pain (4 - 6)  lidocaine 2% Gel 1 Application(s) Topical daily PRN dressing change  lidocaine 2% Viscous 10 milliLiter(s) Swish and Spit every 6 hours PRN Mouth Sores  senna 2 Tablet(s) Oral at bedtime PRN Constipation    Pertinent Labs:  05-30 Na137 mmol/L Glu 111 mg/dL<H> K+ 4.7 mmol/L Cr  0.86 mg/dL BUN 34 mg/dL<H>    GI bowel regimen, n issues  Skin: L lateral thigh wound with vac, mid abdominal surgical wound  with vac, R heel DTI    Estimated Needs:   [X ] no change since previous assessment  [ ] recalculated:       Previous Nutrition Diagnosis:           [X ] Increased Nutrient Needs         Nutrition Diagnosis is [ X] ongoing  [ ] resolved [ ] not applicable          New Nutrition Diagnosis: [X ] not applicable    [ ] Inadequate Protein Energy Intake [ ]Inadequate Oral Intake [ ] Excessive Energy Intake     [ ] Underweight [ ] Increased Nutrient Needs [ ] Overweight/Obesity     [ ] Altered GI Function [ ] Unintended Weight Loss [ ] Food & Nutrition Related Knowledge Deficit[ ] Limited Adherence to nutrition related recommendations [ ] Malnutrition  [ ] other: Free text         Recommend      [X ] Nutrition Supplement  continue folic acid, MVI, Vitamin C, FeS04, Nepro x2, james x2,  prosource x2  daily            Monitoring and Evaluation:     [X] PO intake [X ] Tolerance to diet prescription [ X] weights [X ] follow up per protocol    [ ] other:

## 2018-05-31 NOTE — PROGRESS NOTE ADULT - ASSESSMENT
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     # Recent septic Rt TKR - abx spacer exchanged and plastics complex wound closure  # PMR on chronic steroids  # right knee eschar, right thigh necrosis and abd wound   # Multiple skin ulcers  # Anemia chronic disease    Plan:  wound care and wound vac per plastics and ortho  s/p multiple debridements  multiple ulcerated wounds: Derm c/s appreciated-f/u punch bx Cx-ngtd and path negative for pyoderma gangrenosum  remains afebrile, cont suppressive minocycline  cont coumadin and asa  f/u plastics recs  Pain control- transition to po-in preparation for discharge to rehab  PT OOB no knee motion, immobilizer if out of bed, has bariatric chair  bowel regimen    DC planning once cleared by plastics and ortho

## 2018-06-01 LAB
ANION GAP SERPL CALC-SCNC: 11 MMOL/L — SIGNIFICANT CHANGE UP (ref 5–17)
BUN SERPL-MCNC: 44 MG/DL — HIGH (ref 7–23)
CALCIUM SERPL-MCNC: 9.9 MG/DL — SIGNIFICANT CHANGE UP (ref 8.4–10.5)
CHLORIDE SERPL-SCNC: 99 MMOL/L — SIGNIFICANT CHANGE UP (ref 96–108)
CO2 SERPL-SCNC: 29 MMOL/L — SIGNIFICANT CHANGE UP (ref 22–31)
CREAT SERPL-MCNC: 0.84 MG/DL — SIGNIFICANT CHANGE UP (ref 0.5–1.3)
GLUCOSE SERPL-MCNC: 110 MG/DL — HIGH (ref 70–99)
HCT VFR BLD CALC: 28.5 % — LOW (ref 34.5–45)
HGB BLD-MCNC: 8.9 G/DL — LOW (ref 11.5–15.5)
INR BLD: 3.38 RATIO — HIGH (ref 0.88–1.16)
MCHC RBC-ENTMCNC: 28 PG — SIGNIFICANT CHANGE UP (ref 27–34)
MCHC RBC-ENTMCNC: 31.3 GM/DL — LOW (ref 32–36)
MCV RBC AUTO: 89.3 FL — SIGNIFICANT CHANGE UP (ref 80–100)
PLATELET # BLD AUTO: 277 K/UL — SIGNIFICANT CHANGE UP (ref 150–400)
POTASSIUM SERPL-MCNC: 4.8 MMOL/L — SIGNIFICANT CHANGE UP (ref 3.5–5.3)
POTASSIUM SERPL-SCNC: 4.8 MMOL/L — SIGNIFICANT CHANGE UP (ref 3.5–5.3)
PROTHROM AB SERPL-ACNC: 37.8 SEC — HIGH (ref 9.8–12.7)
RBC # BLD: 3.19 M/UL — LOW (ref 3.8–5.2)
RBC # FLD: 17 % — HIGH (ref 10.3–14.5)
SODIUM SERPL-SCNC: 139 MMOL/L — SIGNIFICANT CHANGE UP (ref 135–145)
WBC # BLD: 12.2 K/UL — HIGH (ref 3.8–10.5)
WBC # FLD AUTO: 12.2 K/UL — HIGH (ref 3.8–10.5)

## 2018-06-01 PROCEDURE — 99233 SBSQ HOSP IP/OBS HIGH 50: CPT | Mod: GC

## 2018-06-01 RX ORDER — SODIUM HYPOCHLORITE 0.125 %
1 SOLUTION, NON-ORAL MISCELLANEOUS
Qty: 0 | Refills: 0 | Status: DISCONTINUED | OUTPATIENT
Start: 2018-06-01 | End: 2018-06-06

## 2018-06-01 RX ORDER — HYDROMORPHONE HYDROCHLORIDE 2 MG/ML
0.5 INJECTION INTRAMUSCULAR; INTRAVENOUS; SUBCUTANEOUS ONCE
Qty: 0 | Refills: 0 | Status: DISCONTINUED | OUTPATIENT
Start: 2018-06-01 | End: 2018-06-01

## 2018-06-01 RX ADMIN — Medication 81 MILLIGRAM(S): at 13:14

## 2018-06-01 RX ADMIN — MUPIROCIN 1 APPLICATION(S): 20 OINTMENT TOPICAL at 17:33

## 2018-06-01 RX ADMIN — Medication 500000 UNIT(S): at 18:51

## 2018-06-01 RX ADMIN — Medication 3 MILLIGRAM(S): at 22:57

## 2018-06-01 RX ADMIN — Medication 1 MILLIGRAM(S): at 13:14

## 2018-06-01 RX ADMIN — GABAPENTIN 200 MILLIGRAM(S): 400 CAPSULE ORAL at 22:39

## 2018-06-01 RX ADMIN — Medication 500 MILLIGRAM(S): at 13:12

## 2018-06-01 RX ADMIN — Medication 1 APPLICATION(S): at 17:33

## 2018-06-01 RX ADMIN — Medication 500000 UNIT(S): at 22:38

## 2018-06-01 RX ADMIN — HYDROMORPHONE HYDROCHLORIDE 2 MILLIGRAM(S): 2 INJECTION INTRAMUSCULAR; INTRAVENOUS; SUBCUTANEOUS at 14:10

## 2018-06-01 RX ADMIN — Medication 1 APPLICATION(S): at 06:46

## 2018-06-01 RX ADMIN — ZINC OXIDE 1 APPLICATION(S): 200 OINTMENT TOPICAL at 17:34

## 2018-06-01 RX ADMIN — Medication 100 MILLIGRAM(S): at 06:43

## 2018-06-01 RX ADMIN — GABAPENTIN 200 MILLIGRAM(S): 400 CAPSULE ORAL at 06:43

## 2018-06-01 RX ADMIN — PANTOPRAZOLE SODIUM 40 MILLIGRAM(S): 20 TABLET, DELAYED RELEASE ORAL at 06:43

## 2018-06-01 RX ADMIN — MINOCYCLINE HYDROCHLORIDE 100 MILLIGRAM(S): 45 TABLET, EXTENDED RELEASE ORAL at 06:43

## 2018-06-01 RX ADMIN — Medication 1 TABLET(S): at 13:14

## 2018-06-01 RX ADMIN — HYDROMORPHONE HYDROCHLORIDE 2 MILLIGRAM(S): 2 INJECTION INTRAMUSCULAR; INTRAVENOUS; SUBCUTANEOUS at 17:27

## 2018-06-01 RX ADMIN — Medication 500000 UNIT(S): at 13:15

## 2018-06-01 RX ADMIN — Medication 1 APPLICATION(S): at 13:12

## 2018-06-01 RX ADMIN — BUDESONIDE AND FORMOTEROL FUMARATE DIHYDRATE 2 PUFF(S): 160; 4.5 AEROSOL RESPIRATORY (INHALATION) at 17:30

## 2018-06-01 RX ADMIN — CLOTRIMAZOLE AND BETAMETHASONE DIPROPIONATE 1 APPLICATION(S): 10; .5 CREAM TOPICAL at 17:32

## 2018-06-01 RX ADMIN — GABAPENTIN 200 MILLIGRAM(S): 400 CAPSULE ORAL at 15:29

## 2018-06-01 RX ADMIN — HYDROMORPHONE HYDROCHLORIDE 2 MILLIGRAM(S): 2 INJECTION INTRAMUSCULAR; INTRAVENOUS; SUBCUTANEOUS at 18:25

## 2018-06-01 RX ADMIN — Medication 100 MILLIGRAM(S): at 15:29

## 2018-06-01 RX ADMIN — MUPIROCIN 1 APPLICATION(S): 20 OINTMENT TOPICAL at 06:45

## 2018-06-01 RX ADMIN — MINOCYCLINE HYDROCHLORIDE 100 MILLIGRAM(S): 45 TABLET, EXTENDED RELEASE ORAL at 17:41

## 2018-06-01 RX ADMIN — HYDROMORPHONE HYDROCHLORIDE 2 MILLIGRAM(S): 2 INJECTION INTRAMUSCULAR; INTRAVENOUS; SUBCUTANEOUS at 13:12

## 2018-06-01 RX ADMIN — HYDROMORPHONE HYDROCHLORIDE 4 MILLIGRAM(S): 2 INJECTION INTRAMUSCULAR; INTRAVENOUS; SUBCUTANEOUS at 22:40

## 2018-06-01 RX ADMIN — POLYETHYLENE GLYCOL 3350 17 GRAM(S): 17 POWDER, FOR SOLUTION ORAL at 13:15

## 2018-06-01 RX ADMIN — Medication 500000 UNIT(S): at 06:44

## 2018-06-01 RX ADMIN — BUDESONIDE AND FORMOTEROL FUMARATE DIHYDRATE 2 PUFF(S): 160; 4.5 AEROSOL RESPIRATORY (INHALATION) at 06:44

## 2018-06-01 RX ADMIN — HYDROMORPHONE HYDROCHLORIDE 4 MILLIGRAM(S): 2 INJECTION INTRAMUSCULAR; INTRAVENOUS; SUBCUTANEOUS at 23:00

## 2018-06-01 RX ADMIN — HYDROMORPHONE HYDROCHLORIDE 0.5 MILLIGRAM(S): 2 INJECTION INTRAMUSCULAR; INTRAVENOUS; SUBCUTANEOUS at 09:51

## 2018-06-01 RX ADMIN — Medication 325 MILLIGRAM(S): at 13:14

## 2018-06-01 RX ADMIN — CLOTRIMAZOLE AND BETAMETHASONE DIPROPIONATE 1 APPLICATION(S): 10; .5 CREAM TOPICAL at 06:45

## 2018-06-01 RX ADMIN — Medication 100 MILLIGRAM(S): at 22:39

## 2018-06-01 RX ADMIN — TIOTROPIUM BROMIDE 1 CAPSULE(S): 18 CAPSULE ORAL; RESPIRATORY (INHALATION) at 13:15

## 2018-06-01 NOTE — PROGRESS NOTE ADULT - ASSESSMENT
77 year old female s/p rudy from right knee and spacer for strept infection then returned with nonhealing wound and had spacer change, fusion ruthann, plastic closure and grew mrsa.   Wound developed necrotic skin, then pneumonia, and hematoma over the right thigh with necrosis of skin too.   Finished full course of iv abx, on suppressive minocycline for the knee. Pneumonia treated.  Debrided thigh and abdomen wound with VAC.  Dermatology performed 4mm punch bx performed of R thigh- cx grew enterobacter and morganella, suggestive of colonizing roopa in a patient with prolonged hospital stay,   Path as reviewed above- inflammatory infiltrate, no features of pyoderma in sample  Afebrile, stable WBC, mild leukocytosis seems nonspecific at this time.  She has been stable the past few weeks.  Plan:   Continue suppressive minocycline   Local wound care likely to require most time/intervention, and remain most debilitating  R knee eschar partially removed, no gross infection  [place] guarded

## 2018-06-01 NOTE — PROGRESS NOTE ADULT - I WAS PHYSICALLY PRESENT FOR THE KEY PORTIONS OF THE EVALUATION AND MANAGEMENT (E/M) SERVICE PROVIDED.  I AGREE WITH THE ABOVE HISTORY, PHYSICAL, AND PLAN WHICH I HAVE REVIEWED AND EDITED WHERE APPROPRIATE

## 2018-06-01 NOTE — PROGRESS NOTE ADULT - SUBJECTIVE AND OBJECTIVE BOX
SUBJECTIVE: Pt seen, chart reviewed.    ongoing evaluation of left lateral leg wound  Daughter present- patient and daughter made aware of my absence x 2 wks, and f/u by wound care team  Was medicated with IV Dilaudid about 1/2 hour earlier for VAC change   Discussed role of nutrition in wound healing    Allergies    amoxicillin (Other)    Intolerances    IV Contrast (Flushing (Skin); Nausea)      aspirin enteric coated 81 milliGRAM(s) Oral daily  minocycline 100 milliGRAM(s) Oral two times a day  nystatin    Suspension 468682 Unit(s) Oral every 6 hours    	    Physical Exam:  Vital Signs Last 24 Hrs  T(C): 36.4 (01 Jun 2018 09:39), Max: 37.7 (31 May 2018 22:08)  T(F): 97.5 (01 Jun 2018 09:39), Max: 99.9 (31 May 2018 22:08)  HR: 71 (01 Jun 2018 09:39) (71 - 85)  BP: 117/73 (01 Jun 2018 09:39) (117/73 - 125/68)  BP(mean): --  RR: 20 (01 Jun 2018 09:39) (18 - 20)  SpO2: 97% (01 Jun 2018 09:39) (95% - 98%)    Found supine in bed using nasal O2  Was OOB yesterday with the use of Hoya but reports that she is too debilitated to do this daily  VAC in place on right abdominal and right thigh wound  Patella wound is dressed  Left lateral thigh wound redressed-overall dimension not changed- much less drainage, and necrotic tissue noted  left lateral leg wound Admits the tip of index finger, and extends into SQ fat  Redressed with Dakins gauze packing      Discussed daughter's observation on prior rehab confinement- advised to s/w discharge planning        LABS:                        8.9    12.2  )-----------( 277      ( 01 Jun 2018 07:30 )             28.5     PT/INR - ( 01 Jun 2018 07:30 )   PT: 37.8 sec;   INR: 3.38 ratio             Op f/u info again provided

## 2018-06-01 NOTE — PROGRESS NOTE ADULT - ASSESSMENT
ASSESSMENT AND PLAN:     76 yo F with HLD, GERD, CAD s/p HERBERT, PMR on chronic steroids, OA s/p TKR with recent joint infection. Pt had slowly healing ulcer on lower abdomen, R upper thigh and R medial knee.    Skin biopsy from the ulcer on R medial knee showed an ulcer without much inflammation. The biopsy pattern is not suggestive of pyoderma gangrenosum. Tissue culture grew contaminant roopa growth per ID. From these results, the ulcers are unlikely to represent PG or infection, but the locations of the ulcers are not typical of venous or arterial ulcers. Their morphology has been manipulated by debridement, but these ulcers do not appear reticular as in calciphylaxis or other vasculitic or vasculopathic process.        Gilda Castillo MD  Resident Physician, PGY-3  Department of Dermatology  [facility]  Pager: [phone]  Office: [phone] ASSESSMENT AND PLAN:     76 yo F with HLD, GERD, CAD s/p HERBERT, PMR on chronic steroids, OA s/p TKR with recent joint infection. Pt had slowly healing ulcer on lower abdomen, R upper thigh and R medial knee.    Skin biopsy from the ulcer on R medial knee showed an ulcer without much inflammation. The biopsy pattern is not suggestive of pyoderma gangrenosum. Tissue culture grew contaminant roopa growth per ID. From these results, the ulcers are unlikely to represent PG or infection, but the locations of the ulcers are not typical of venous or arterial ulcers. Their morphology has been manipulated by debridement, but these ulcers do not appear reticular as in calciphylaxis or other vasculitic or vasculopathic process.    -Would recommend wound care as per primary team  -Will follow with you      Gilda Castillo MD  Resident Physician, PGY-3  Department of Dermatology  [facility]  Pager: [phone]  Office: [phone]

## 2018-06-01 NOTE — PROGRESS NOTE ADULT - SUBJECTIVE AND OBJECTIVE BOX
CC: f/u for leukocytosis and wounds    Patient reports no complaints, tearful  and crying    REVIEW OF SYSTEMS:  All other review of systems negative (Comprehensive ROS)    Antimicrobials Day #  :long term  minocycline 100 milliGRAM(s) Oral two times a day  nystatin    Suspension 835971 Unit(s) Oral every 6 hours    Other Medications Reviewed    T(F): 97.5 (06-01-18 @ 09:39), Max: 99.9 (05-31-18 @ 22:08)  HR: 71 (06-01-18 @ 09:39)  BP: 117/73 (06-01-18 @ 09:39)  RR: 20 (06-01-18 @ 09:39)  SpO2: 97% (06-01-18 @ 09:39)  Wt(kg): --    PHYSICAL EXAM:  General: alert, no acute distress, lying flat in clinitron type bed  Eyes:  anicteric, no conjunctival injection, no discharge  Oropharynx: no lesions or injection 	  Neck: supple, without adenopathy  Lungs: clear to auscultation, diminished at bases  Heart: regular rate and rhythm; no murmur, rubs or gallops  Abdomen: soft, nondistended, nontender, without mass or organomegaly  Skin: multiple wounds.Wound vac x 2, rt knee ulcer relatively clean, left flank wound packed  Extremities: no clubbing, cyanosis, or edema  Neurologic: alert, oriented, moves all extremities    LAB RESULTS:                        8.9    12.2  )-----------( 277      ( 01 Jun 2018 07:30 )             28.5     06-01    139  |  99  |  44<H>  ----------------------------<  110<H>  4.8   |  29  |  0.84    Ca    9.9      01 Jun 2018 07:30          MICROBIOLOGY:  RECENT CULTURES:      RADIOLOGY REVIEWED:

## 2018-06-01 NOTE — PROGRESS NOTE ADULT - SUBJECTIVE AND OBJECTIVE BOX
PLASTIC SURGERY:    S: Patient seen and examined. No acute events overnight.    O:  Vital Signs Last 24 Hrs  T(C): 37.7 (31 May 2018 22:08), Max: 37.7 (31 May 2018 22:08)  T(F): 99.9 (31 May 2018 22:08), Max: 99.9 (31 May 2018 22:08)  HR: 81 (31 May 2018 22:08) (81 - 85)  BP: 122/68 (31 May 2018 22:08) (121/60 - 125/68)  BP(mean): --  RR: 18 (31 May 2018 22:08) (18 - 19)  SpO2: 98% (31 May 2018 22:08) (95% - 98%)    I&O's Detail    31 May 2018 07:01  -  01 Jun 2018 07:00  --------------------------------------------------------  IN:    Oral Fluid: 600 mL  Total IN: 600 mL    OUT:    Voided: 150 mL  Total OUT: 150 mL    Total NET: 450 mL    MEDICATIONS  (STANDING):  ascorbic acid 500 milliGRAM(s) Oral daily  aspirin enteric coated 81 milliGRAM(s) Oral daily  buDESOnide 160 MICROgram(s)/formoterol 4.5 MICROgram(s) Inhaler 2 Puff(s) Inhalation two times a day  clotrimazole/betamethasone Cream 1 Application(s) Topical two times a day  Dakins Solution - 1/4 Strength 1 Application(s) Topical daily  docusate sodium 100 milliGRAM(s) Oral three times a day  epoetin jenni Injectable 52884 Unit(s) SubCutaneous every 7 days  ferrous    sulfate 325 milliGRAM(s) Oral daily  folic acid 1 milliGRAM(s) Oral daily  gabapentin 200 milliGRAM(s) Oral three times a day  melatonin 3 milliGRAM(s) Oral at bedtime  minocycline 100 milliGRAM(s) Oral two times a day  multivitamin 1 Tablet(s) Oral daily  mupirocin 2% Ointment 1 Application(s) Topical two times a day  nystatin    Suspension 445049 Unit(s) Oral every 6 hours  pantoprazole    Tablet 40 milliGRAM(s) Oral before breakfast  polyethylene glycol 3350 17 Gram(s) Oral daily  povidone iodine 10% Solution 1 Application(s) Topical every 12 hours  tiotropium 18 MICROgram(s) Capsule 1 Capsule(s) Inhalation daily  zinc oxide 20% Ointment 1 Application(s) Topical daily    MEDICATIONS  (PRN):  acetaminophen   Tablet 650 milliGRAM(s) Oral every 6 hours PRN For Temp greater than 38 C (100.4 F)  acetaminophen   Tablet. 650 milliGRAM(s) Oral every 6 hours PRN Mild Pain (1 - 3)  bisacodyl Suppository 10 milliGRAM(s) Rectal daily PRN Constipation  HYDROmorphone   Tablet 4 milliGRAM(s) Oral every 4 hours PRN Severe Pain (7 - 10)  HYDROmorphone   Tablet 2 milliGRAM(s) Oral every 4 hours PRN Moderate Pain (4 - 6)  lidocaine 2% Gel 1 Application(s) Topical daily PRN dressing change  lidocaine 2% Viscous 10 milliLiter(s) Swish and Spit every 6 hours PRN Mouth Sores  senna 2 Tablet(s) Oral at bedtime PRN Constipation                      8.9    12.2  )-----------( 277      ( 01 Jun 2018 07:30 )             28.5   06-01    139  |  99  |  44<H>  ----------------------------<  110<H>  4.8   |  29  |  0.84    Ca    9.9      01 Jun 2018 07:30    Physical Exam:  Gen: Laying in bed, NAD, alert and oriented.   RLE knee wound with eschar mostly removed, granulation tissue at base, Wet-to-dry dressing placed. Proximal RLE and abdominal wound vacs in place.    Lines:   IV: patent, in place.

## 2018-06-01 NOTE — PROGRESS NOTE ADULT - ASSESSMENT
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     # Recent septic Rt TKR - abx spacer exchanged and plastics complex wound closure  # PMR (off steroids to allow for wound healing)  # right knee eschar, right thigh necrosis and abd wound, s/p multiple debridements  # Multiple skin ulcers  # Anemia chronic disease    Plan:  wound care and wound vac per plastics  multiple ulcerated wounds: evaluated by derm, punch bx Cx-ngtd and path negative for pyoderma gangrenosum  remains afebrile, cont suppressive minocycline  cont coumadin and asa  Pain control  PT OOB no knee motion, immobilizer if out of bed, has bariatric chair  bowel regimen    DC planning to rehab

## 2018-06-01 NOTE — PROGRESS NOTE ADULT - ASSESSMENT
A: 77F with complex PMH/PSH who recently had placement of a right knee antibiotic spacer followed by PRS closure c/b skin necrosis and formation of an eschar over the anterior knee joint. The patient was readmitted with fevers and right thigh cellulitis which was complicated by skin breakdown. Patient also developed wound at the right/mid lower quadrant of the abdomen.    P:  >> Dressing change TID to knee  >> Betadine to remaining eschar bid  >> VACs on abdomen and R thigh with a Y-connector with change today  >> No active Rheum. issues- continue to hold steroids  >> Derm- ulcers, not pyoderma gangrenosum  >> Care per primary team.  >> DVT prophylaxis.  >> Protein supplementation of diet to encourage wound healing  >> Cleared for discharge from Plastic Surgery perspective    [facility] Plastic Surgery Pager -- [phone]  [facility] Plastic Surgery Pager -- [phone]

## 2018-06-01 NOTE — PROGRESS NOTE ADULT - SUBJECTIVE AND OBJECTIVE BOX
INTERVAL HPI/OVERNIGHT EVENTS:  Wounds are stable. No new wound.    MEDICATIONS  (STANDING):  ascorbic acid 500 milliGRAM(s) Oral daily  aspirin enteric coated 81 milliGRAM(s) Oral daily  buDESOnide 160 MICROgram(s)/formoterol 4.5 MICROgram(s) Inhaler 2 Puff(s) Inhalation two times a day  clotrimazole/betamethasone Cream 1 Application(s) Topical two times a day  Dakins Solution - 1/4 Strength 1 Application(s) Topical two times a day  docusate sodium 100 milliGRAM(s) Oral three times a day  epoetin jenni Injectable 38272 Unit(s) SubCutaneous every 7 days  ferrous    sulfate 325 milliGRAM(s) Oral daily  folic acid 1 milliGRAM(s) Oral daily  gabapentin 200 milliGRAM(s) Oral three times a day  melatonin 3 milliGRAM(s) Oral at bedtime  minocycline 100 milliGRAM(s) Oral two times a day  multivitamin 1 Tablet(s) Oral daily  mupirocin 2% Ointment 1 Application(s) Topical two times a day  nystatin    Suspension 721075 Unit(s) Oral every 6 hours  pantoprazole    Tablet 40 milliGRAM(s) Oral before breakfast  polyethylene glycol 3350 17 Gram(s) Oral daily  povidone iodine 10% Solution 1 Application(s) Topical every 12 hours  tiotropium 18 MICROgram(s) Capsule 1 Capsule(s) Inhalation daily  zinc oxide 20% Ointment 1 Application(s) Topical daily    MEDICATIONS  (PRN):  acetaminophen   Tablet 650 milliGRAM(s) Oral every 6 hours PRN For Temp greater than 38 C (100.4 F)  acetaminophen   Tablet. 650 milliGRAM(s) Oral every 6 hours PRN Mild Pain (1 - 3)  bisacodyl Suppository 10 milliGRAM(s) Rectal daily PRN Constipation  HYDROmorphone   Tablet 4 milliGRAM(s) Oral every 4 hours PRN Severe Pain (7 - 10)  HYDROmorphone   Tablet 2 milliGRAM(s) Oral every 4 hours PRN Moderate Pain (4 - 6)  lidocaine 2% Gel 1 Application(s) Topical daily PRN dressing change  lidocaine 2% Viscous 10 milliLiter(s) Swish and Spit every 6 hours PRN Mouth Sores  senna 2 Tablet(s) Oral at bedtime PRN Constipation      Allergies    amoxicillin (Other)    Intolerances    IV Contrast (Flushing (Skin); Nausea)      REVIEW OF SYSTEMS      General: no fevers/chills, no NS	    Skin: see HPI  	  Ophthalmologic: no eye pain or change in vision    Genitourinary: no dysuria or hematuria    Musculoskeletal: no joint pains or weakness	    Neurological:no weakness or tingling          Vital Signs Last 24 Hrs  T(C): 36.6 (02 Jun 2018 00:51), Max: 36.9 (01 Jun 2018 20:13)  T(F): 97.8 (02 Jun 2018 00:51), Max: 98.5 (01 Jun 2018 20:13)  HR: 82 (02 Jun 2018 00:51) (71 - 87)  BP: 109/68 (02 Jun 2018 00:51) (109/68 - 120/74)  BP(mean): --  RR: 20 (02 Jun 2018 00:51) (18 - 20)  SpO2: 95% (02 Jun 2018 00:51) (95% - 97%)    PHYSICAL EXAM:   The patient was alert and oriented X 3, well nourished, and in no  apparent distress.  There was no visible lymphadenopathy.  Conjunctiva were non injected  There was no clubbing or edema of extremities.  There was no hyperhidrosis or bromhidrosis.    Of note on skin exam:   Wounds with wound vac on lower abd, R upper thigh  Round ulcer with some areas of serous crust superiorlu    LABS:                        8.9    12.2  )-----------( 277      ( 01 Jun 2018 07:30 )             28.5     06-01    139  |  99  |  44<H>  ----------------------------<  110<H>  4.8   |  29  |  0.84    Ca    9.9      01 Jun 2018 07:30      PT/INR - ( 01 Jun 2018 07:30 )   PT: 37.8 sec;   INR: 3.38 ratio INTERVAL HPI/OVERNIGHT EVENTS:  Wounds are stable. No new wound.    MEDICATIONS  (STANDING):  ascorbic acid 500 milliGRAM(s) Oral daily  aspirin enteric coated 81 milliGRAM(s) Oral daily  buDESOnide 160 MICROgram(s)/formoterol 4.5 MICROgram(s) Inhaler 2 Puff(s) Inhalation two times a day  clotrimazole/betamethasone Cream 1 Application(s) Topical two times a day  Dakins Solution - 1/4 Strength 1 Application(s) Topical two times a day  docusate sodium 100 milliGRAM(s) Oral three times a day  epoetin jenni Injectable 99383 Unit(s) SubCutaneous every 7 days  ferrous    sulfate 325 milliGRAM(s) Oral daily  folic acid 1 milliGRAM(s) Oral daily  gabapentin 200 milliGRAM(s) Oral three times a day  melatonin 3 milliGRAM(s) Oral at bedtime  minocycline 100 milliGRAM(s) Oral two times a day  multivitamin 1 Tablet(s) Oral daily  mupirocin 2% Ointment 1 Application(s) Topical two times a day  nystatin    Suspension 170550 Unit(s) Oral every 6 hours  pantoprazole    Tablet 40 milliGRAM(s) Oral before breakfast  polyethylene glycol 3350 17 Gram(s) Oral daily  povidone iodine 10% Solution 1 Application(s) Topical every 12 hours  tiotropium 18 MICROgram(s) Capsule 1 Capsule(s) Inhalation daily  zinc oxide 20% Ointment 1 Application(s) Topical daily    MEDICATIONS  (PRN):  acetaminophen   Tablet 650 milliGRAM(s) Oral every 6 hours PRN For Temp greater than 38 C (100.4 F)  acetaminophen   Tablet. 650 milliGRAM(s) Oral every 6 hours PRN Mild Pain (1 - 3)  bisacodyl Suppository 10 milliGRAM(s) Rectal daily PRN Constipation  HYDROmorphone   Tablet 4 milliGRAM(s) Oral every 4 hours PRN Severe Pain (7 - 10)  HYDROmorphone   Tablet 2 milliGRAM(s) Oral every 4 hours PRN Moderate Pain (4 - 6)  lidocaine 2% Gel 1 Application(s) Topical daily PRN dressing change  lidocaine 2% Viscous 10 milliLiter(s) Swish and Spit every 6 hours PRN Mouth Sores  senna 2 Tablet(s) Oral at bedtime PRN Constipation      Allergies    amoxicillin (Other)    Intolerances    IV Contrast (Flushing (Skin); Nausea)      REVIEW OF SYSTEMS      General: no fevers/chills, no NS	    Skin: see HPI  	  Ophthalmologic: no eye pain or change in vision    Genitourinary: no dysuria or hematuria    Musculoskeletal: no joint pains or weakness	    Neurological:no weakness or tingling          Vital Signs Last 24 Hrs  T(C): 36.6 (02 Jun 2018 00:51), Max: 36.9 (01 Jun 2018 20:13)  T(F): 97.8 (02 Jun 2018 00:51), Max: 98.5 (01 Jun 2018 20:13)  HR: 82 (02 Jun 2018 00:51) (71 - 87)  BP: 109/68 (02 Jun 2018 00:51) (109/68 - 120/74)  BP(mean): --  RR: 20 (02 Jun 2018 00:51) (18 - 20)  SpO2: 95% (02 Jun 2018 00:51) (95% - 97%)    PHYSICAL EXAM:   The patient was alert and oriented X 3, well nourished, and in no  apparent distress.  There was no visible lymphadenopathy.  Conjunctiva were non injected  There was no clubbing or edema of extremities.  There was no hyperhidrosis or bromhidrosis.    Of note on skin exam:   Wounds with wound vac on lower abd, R upper thigh  Round ulcer with some areas of serous crust superiorly    LABS:                        8.9    12.2  )-----------( 277      ( 01 Jun 2018 07:30 )             28.5     06-01    139  |  99  |  44<H>  ----------------------------<  110<H>  4.8   |  29  |  0.84    Ca    9.9      01 Jun 2018 07:30      PT/INR - ( 01 Jun 2018 07:30 )   PT: 37.8 sec;   INR: 3.38 ratio

## 2018-06-01 NOTE — PROGRESS NOTE ADULT - SUBJECTIVE AND OBJECTIVE BOX
Patient is a 77y old  Female who presents with a chief complaint of fever (11 Apr 2018 14:25)      SUBJECTIVE / OVERNIGHT EVENTS:    Patient seen and examined. very upset bc she is about to have vac changed. denies cp sob.      Vital Signs Last 24 Hrs  T(C): 36.4 (01 Jun 2018 09:39), Max: 37.7 (31 May 2018 22:08)  T(F): 97.5 (01 Jun 2018 09:39), Max: 99.9 (31 May 2018 22:08)  HR: 71 (01 Jun 2018 09:39) (71 - 85)  BP: 117/73 (01 Jun 2018 09:39) (117/73 - 125/68)  BP(mean): --  RR: 20 (01 Jun 2018 09:39) (18 - 20)  SpO2: 97% (01 Jun 2018 09:39) (95% - 98%)  I&O's Summary    31 May 2018 07:01  -  01 Jun 2018 07:00  --------------------------------------------------------  IN: 600 mL / OUT: 150 mL / NET: 450 mL    01 Jun 2018 07:01  -  01 Jun 2018 11:07  --------------------------------------------------------  IN: 240 mL / OUT: 0 mL / NET: 240 mL        PE:  GENERAL: NAD, AAOx3  HEAD:  Atraumatic, Normocephalic  EYES: EOMI, PERRLA, conjunctiva and sclera clear  NECK: Supple, No JVD  CHEST/LUNG: CTABL, No wheeze  HEART: Regular rate and rhythm; no murmur  ABDOMEN: Soft, Nontender, Nondistended; Bowel sounds present, lower abd wound wound vac  EXTREMITIES:  2+ Peripheral Pulses, right thigh wound c wound vac, right knee debrided, left thigh small wound packed  SKIN: No rashes or lesions  NEURO: No focal deficits    LABS:                        8.9    12.2  )-----------( 277      ( 01 Jun 2018 07:30 )             28.5     06-01    139  |  99  |  44<H>  ----------------------------<  110<H>  4.8   |  29  |  0.84    Ca    9.9      01 Jun 2018 07:30      PT/INR - ( 01 Jun 2018 07:30 )   PT: 37.8 sec;   INR: 3.38 ratio           CAPILLARY BLOOD GLUCOSE                RADIOLOGY & ADDITIONAL TESTS:    Imaging Personally Reviewed:  [x] YES  [ ] NO    Consultant(s) Notes Reviewed:  [x] YES  [ ] NO    MEDICATIONS  (STANDING):  ascorbic acid 500 milliGRAM(s) Oral daily  aspirin enteric coated 81 milliGRAM(s) Oral daily  buDESOnide 160 MICROgram(s)/formoterol 4.5 MICROgram(s) Inhaler 2 Puff(s) Inhalation two times a day  clotrimazole/betamethasone Cream 1 Application(s) Topical two times a day  Dakins Solution - 1/4 Strength 1 Application(s) Topical daily  docusate sodium 100 milliGRAM(s) Oral three times a day  epoetin jenni Injectable 60578 Unit(s) SubCutaneous every 7 days  ferrous    sulfate 325 milliGRAM(s) Oral daily  folic acid 1 milliGRAM(s) Oral daily  gabapentin 200 milliGRAM(s) Oral three times a day  melatonin 3 milliGRAM(s) Oral at bedtime  minocycline 100 milliGRAM(s) Oral two times a day  multivitamin 1 Tablet(s) Oral daily  mupirocin 2% Ointment 1 Application(s) Topical two times a day  nystatin    Suspension 472144 Unit(s) Oral every 6 hours  pantoprazole    Tablet 40 milliGRAM(s) Oral before breakfast  polyethylene glycol 3350 17 Gram(s) Oral daily  povidone iodine 10% Solution 1 Application(s) Topical every 12 hours  tiotropium 18 MICROgram(s) Capsule 1 Capsule(s) Inhalation daily  zinc oxide 20% Ointment 1 Application(s) Topical daily    MEDICATIONS  (PRN):  acetaminophen   Tablet 650 milliGRAM(s) Oral every 6 hours PRN For Temp greater than 38 C (100.4 F)  acetaminophen   Tablet. 650 milliGRAM(s) Oral every 6 hours PRN Mild Pain (1 - 3)  bisacodyl Suppository 10 milliGRAM(s) Rectal daily PRN Constipation  HYDROmorphone   Tablet 4 milliGRAM(s) Oral every 4 hours PRN Severe Pain (7 - 10)  HYDROmorphone   Tablet 2 milliGRAM(s) Oral every 4 hours PRN Moderate Pain (4 - 6)  lidocaine 2% Gel 1 Application(s) Topical daily PRN dressing change  lidocaine 2% Viscous 10 milliLiter(s) Swish and Spit every 6 hours PRN Mouth Sores  senna 2 Tablet(s) Oral at bedtime PRN Constipation      Care Discussed with Consultants/Other Providers [x] YES  [ ] NO    HEALTH ISSUES - PROBLEM Dx:  Skin ulcer, unspecified ulcer stage: Skin ulcer, unspecified ulcer stage  Pain: Pain  Adjustment disorder, unspecified type  Delirium due to another medical condition  Pneumonia: Pneumonia  Aortic stenosis, severe: Aortic stenosis, severe  MYAH (obstructive sleep apnea): MYAH (obstructive sleep apnea)  Obesity: Obesity  Asthma: Asthma  SOB (shortness of breath): SOB (shortness of breath)  Chronic deep vein thrombosis (DVT) of lower extremity, unspecified laterality, unspecified vein: Chronic deep vein thrombosis (DVT) of lower extremity, unspecified laterality, unspecified vein  Arthralgia of knee, unspecified laterality: Arthralgia of knee, unspecified laterality  Hyperlipidemia, unspecified hyperlipidemia type: Hyperlipidemia, unspecified hyperlipidemia type  Asthma, unspecified asthma severity, unspecified whether complicated, unspecified whether persistent: Asthma, unspecified asthma severity, unspecified whether complicated, unspecified whether persistent  Coronary artery disease involving native coronary artery of native heart without angina pectoris: Coronary artery disease involving native coronary artery of native heart without angina pectoris  Gastroesophageal reflux disease, esophagitis presence not specified: Gastroesophageal reflux disease, esophagitis presence not specified  Fever, unspecified fever cause: Fever, unspecified fever cause

## 2018-06-02 LAB
ALBUMIN SERPL ELPH-MCNC: 2.7 G/DL — LOW (ref 3.3–5)
ALP SERPL-CCNC: 93 U/L — SIGNIFICANT CHANGE UP (ref 40–120)
ALT FLD-CCNC: 10 U/L — SIGNIFICANT CHANGE UP (ref 10–45)
ANION GAP SERPL CALC-SCNC: 11 MMOL/L — SIGNIFICANT CHANGE UP (ref 5–17)
AST SERPL-CCNC: 11 U/L — SIGNIFICANT CHANGE UP (ref 10–40)
BASOPHILS # BLD AUTO: 0.02 K/UL — SIGNIFICANT CHANGE UP (ref 0–0.2)
BASOPHILS NFR BLD AUTO: 0.2 % — SIGNIFICANT CHANGE UP (ref 0–2)
BILIRUB SERPL-MCNC: 0.2 MG/DL — SIGNIFICANT CHANGE UP (ref 0.2–1.2)
BUN SERPL-MCNC: 48 MG/DL — HIGH (ref 7–23)
CALCIUM SERPL-MCNC: 10 MG/DL — SIGNIFICANT CHANGE UP (ref 8.4–10.5)
CHLORIDE SERPL-SCNC: 99 MMOL/L — SIGNIFICANT CHANGE UP (ref 96–108)
CO2 SERPL-SCNC: 31 MMOL/L — SIGNIFICANT CHANGE UP (ref 22–31)
CREAT SERPL-MCNC: 0.86 MG/DL — SIGNIFICANT CHANGE UP (ref 0.5–1.3)
EOSINOPHIL # BLD AUTO: 0.4 K/UL — SIGNIFICANT CHANGE UP (ref 0–0.5)
EOSINOPHIL NFR BLD AUTO: 3.6 % — SIGNIFICANT CHANGE UP (ref 0–6)
GLUCOSE SERPL-MCNC: 106 MG/DL — HIGH (ref 70–99)
HCT VFR BLD CALC: 28.5 % — LOW (ref 34.5–45)
HGB BLD-MCNC: 8.3 G/DL — LOW (ref 11.5–15.5)
IMM GRANULOCYTES NFR BLD AUTO: 0.4 % — SIGNIFICANT CHANGE UP (ref 0–1.5)
INR BLD: 2.99 RATIO — HIGH (ref 0.88–1.16)
LYMPHOCYTES # BLD AUTO: 2.33 K/UL — SIGNIFICANT CHANGE UP (ref 1–3.3)
LYMPHOCYTES # BLD AUTO: 21.2 % — SIGNIFICANT CHANGE UP (ref 13–44)
MCHC RBC-ENTMCNC: 26.6 PG — LOW (ref 27–34)
MCHC RBC-ENTMCNC: 29.1 GM/DL — LOW (ref 32–36)
MCV RBC AUTO: 91.3 FL — SIGNIFICANT CHANGE UP (ref 80–100)
MONOCYTES # BLD AUTO: 0.92 K/UL — HIGH (ref 0–0.9)
MONOCYTES NFR BLD AUTO: 8.4 % — SIGNIFICANT CHANGE UP (ref 2–14)
NEUTROPHILS # BLD AUTO: 7.29 K/UL — SIGNIFICANT CHANGE UP (ref 1.8–7.4)
NEUTROPHILS NFR BLD AUTO: 66.2 % — SIGNIFICANT CHANGE UP (ref 43–77)
PLATELET # BLD AUTO: 274 K/UL — SIGNIFICANT CHANGE UP (ref 150–400)
POTASSIUM SERPL-MCNC: 5 MMOL/L — SIGNIFICANT CHANGE UP (ref 3.5–5.3)
POTASSIUM SERPL-SCNC: 5 MMOL/L — SIGNIFICANT CHANGE UP (ref 3.5–5.3)
PROT SERPL-MCNC: 7.1 G/DL — SIGNIFICANT CHANGE UP (ref 6–8.3)
PROTHROM AB SERPL-ACNC: 33 SEC — HIGH (ref 9.8–12.7)
RBC # BLD: 3.12 M/UL — LOW (ref 3.8–5.2)
RBC # FLD: 18.8 % — HIGH (ref 10.3–14.5)
SODIUM SERPL-SCNC: 141 MMOL/L — SIGNIFICANT CHANGE UP (ref 135–145)
WBC # BLD: 11 K/UL — HIGH (ref 3.8–10.5)
WBC # FLD AUTO: 11 K/UL — HIGH (ref 3.8–10.5)

## 2018-06-02 RX ORDER — WARFARIN SODIUM 2.5 MG/1
0.5 TABLET ORAL ONCE
Qty: 0 | Refills: 0 | Status: COMPLETED | OUTPATIENT
Start: 2018-06-02 | End: 2018-06-02

## 2018-06-02 RX ADMIN — TIOTROPIUM BROMIDE 1 CAPSULE(S): 18 CAPSULE ORAL; RESPIRATORY (INHALATION) at 11:35

## 2018-06-02 RX ADMIN — Medication 500000 UNIT(S): at 11:35

## 2018-06-02 RX ADMIN — POLYETHYLENE GLYCOL 3350 17 GRAM(S): 17 POWDER, FOR SOLUTION ORAL at 11:35

## 2018-06-02 RX ADMIN — Medication 650 MILLIGRAM(S): at 13:00

## 2018-06-02 RX ADMIN — BUDESONIDE AND FORMOTEROL FUMARATE DIHYDRATE 2 PUFF(S): 160; 4.5 AEROSOL RESPIRATORY (INHALATION) at 06:41

## 2018-06-02 RX ADMIN — GABAPENTIN 200 MILLIGRAM(S): 400 CAPSULE ORAL at 13:01

## 2018-06-02 RX ADMIN — SENNA PLUS 2 TABLET(S): 8.6 TABLET ORAL at 21:16

## 2018-06-02 RX ADMIN — Medication 500 MILLIGRAM(S): at 11:35

## 2018-06-02 RX ADMIN — HYDROMORPHONE HYDROCHLORIDE 4 MILLIGRAM(S): 2 INJECTION INTRAMUSCULAR; INTRAVENOUS; SUBCUTANEOUS at 22:55

## 2018-06-02 RX ADMIN — GABAPENTIN 200 MILLIGRAM(S): 400 CAPSULE ORAL at 21:15

## 2018-06-02 RX ADMIN — Medication 3 MILLIGRAM(S): at 22:22

## 2018-06-02 RX ADMIN — Medication 1 MILLIGRAM(S): at 11:35

## 2018-06-02 RX ADMIN — Medication 1 APPLICATION(S): at 06:45

## 2018-06-02 RX ADMIN — HYDROMORPHONE HYDROCHLORIDE 4 MILLIGRAM(S): 2 INJECTION INTRAMUSCULAR; INTRAVENOUS; SUBCUTANEOUS at 22:21

## 2018-06-02 RX ADMIN — CLOTRIMAZOLE AND BETAMETHASONE DIPROPIONATE 1 APPLICATION(S): 10; .5 CREAM TOPICAL at 06:43

## 2018-06-02 RX ADMIN — MUPIROCIN 1 APPLICATION(S): 20 OINTMENT TOPICAL at 06:44

## 2018-06-02 RX ADMIN — Medication 81 MILLIGRAM(S): at 11:35

## 2018-06-02 RX ADMIN — Medication 100 MILLIGRAM(S): at 13:01

## 2018-06-02 RX ADMIN — Medication 1 TABLET(S): at 11:35

## 2018-06-02 RX ADMIN — ZINC OXIDE 1 APPLICATION(S): 200 OINTMENT TOPICAL at 11:36

## 2018-06-02 RX ADMIN — MINOCYCLINE HYDROCHLORIDE 100 MILLIGRAM(S): 45 TABLET, EXTENDED RELEASE ORAL at 06:43

## 2018-06-02 RX ADMIN — CLOTRIMAZOLE AND BETAMETHASONE DIPROPIONATE 1 APPLICATION(S): 10; .5 CREAM TOPICAL at 17:29

## 2018-06-02 RX ADMIN — Medication 100 MILLIGRAM(S): at 21:15

## 2018-06-02 RX ADMIN — Medication 500000 UNIT(S): at 22:22

## 2018-06-02 RX ADMIN — Medication 1 APPLICATION(S): at 16:10

## 2018-06-02 RX ADMIN — Medication 1 APPLICATION(S): at 06:44

## 2018-06-02 RX ADMIN — HYDROMORPHONE HYDROCHLORIDE 4 MILLIGRAM(S): 2 INJECTION INTRAMUSCULAR; INTRAVENOUS; SUBCUTANEOUS at 10:50

## 2018-06-02 RX ADMIN — Medication 100 MILLIGRAM(S): at 06:43

## 2018-06-02 RX ADMIN — GABAPENTIN 200 MILLIGRAM(S): 400 CAPSULE ORAL at 06:43

## 2018-06-02 RX ADMIN — BUDESONIDE AND FORMOTEROL FUMARATE DIHYDRATE 2 PUFF(S): 160; 4.5 AEROSOL RESPIRATORY (INHALATION) at 17:28

## 2018-06-02 RX ADMIN — WARFARIN SODIUM 0.5 MILLIGRAM(S): 2.5 TABLET ORAL at 21:15

## 2018-06-02 RX ADMIN — MINOCYCLINE HYDROCHLORIDE 100 MILLIGRAM(S): 45 TABLET, EXTENDED RELEASE ORAL at 17:30

## 2018-06-02 RX ADMIN — MUPIROCIN 1 APPLICATION(S): 20 OINTMENT TOPICAL at 17:29

## 2018-06-02 RX ADMIN — HYDROMORPHONE HYDROCHLORIDE 4 MILLIGRAM(S): 2 INJECTION INTRAMUSCULAR; INTRAVENOUS; SUBCUTANEOUS at 11:20

## 2018-06-02 RX ADMIN — Medication 500000 UNIT(S): at 17:29

## 2018-06-02 RX ADMIN — Medication 650 MILLIGRAM(S): at 12:49

## 2018-06-02 RX ADMIN — Medication 500000 UNIT(S): at 06:44

## 2018-06-02 RX ADMIN — PANTOPRAZOLE SODIUM 40 MILLIGRAM(S): 20 TABLET, DELAYED RELEASE ORAL at 06:44

## 2018-06-02 RX ADMIN — Medication 325 MILLIGRAM(S): at 11:35

## 2018-06-02 RX ADMIN — Medication 1 APPLICATION(S): at 17:29

## 2018-06-02 NOTE — PROGRESS NOTE ADULT - SUBJECTIVE AND OBJECTIVE BOX
CC: f/u for leukocytosis and multiple wounds    Patient reports no specific complaints today, emotional, cries with being cleaned    REVIEW OF SYSTEMS:  All other review of systems negative (Comprehensive ROS)    Antimicrobials Day #  :long term  minocycline 100 milliGRAM(s) Oral two times a day  nystatin    Suspension 347868 Unit(s) Oral every 6 hours    Other Medications Reviewed    T(F): 97.8 (06-02-18 @ 06:30), Max: 98.5 (06-01-18 @ 20:13)  HR: 76 (06-02-18 @ 06:30)  BP: 134/73 (06-02-18 @ 06:30)  RR: 18 (06-02-18 @ 06:30)  SpO2: 95% (06-02-18 @ 06:30)  Wt(kg): --    PHYSICAL EXAM:  General: alert, no acute distress  Eyes:  anicteric, no conjunctival injection, no discharge  Oropharynx: no lesions or injection 	  Neck: supple, without adenopathy  Lungs: clear to auscultation, diminished at bases  Heart: regular rate and rhythm; no murmur, rubs or gallops  Abdomen: soft, nondistended, nontender, without mass or organomegaly.lower abdominal wound vac in place  Skin: no lesions  Extremities: no clubbing, cyanosis, or edema.Rt thigh wound vac, Rt knee with ulcer with a clean base  Neurologic: alert, oriented, moves all extremities    LAB RESULTS:                        8.9    12.2  )-----------( 277      ( 01 Jun 2018 07:30 )             28.5     06-02    141  |  99  |  48<H>  ----------------------------<  106<H>  5.0   |  31  |  0.86    Ca    10.0      02 Jun 2018 08:18    TPro  7.1  /  Alb  2.7<L>  /  TBili  0.2  /  DBili  x   /  AST  11  /  ALT  10  /  AlkPhos  93  06-02    LIVER FUNCTIONS - ( 02 Jun 2018 08:18 )  Alb: 2.7 g/dL / Pro: 7.1 g/dL / ALK PHOS: 93 U/L / ALT: 10 U/L / AST: 11 U/L / GGT: x             MICROBIOLOGY:  RECENT CULTURES:      RADIOLOGY REVIEWED:

## 2018-06-02 NOTE — PROGRESS NOTE ADULT - SUBJECTIVE AND OBJECTIVE BOX
Patient is a 77y old  Female who presents with a chief complaint of fever (11 Apr 2018 14:25)      SUBJECTIVE / OVERNIGHT EVENTS:    Patient seen and examined. feels well. no complaints.      Vital Signs Last 24 Hrs  T(C): 36.6 (02 Jun 2018 06:30), Max: 36.9 (01 Jun 2018 20:13)  T(F): 97.8 (02 Jun 2018 06:30), Max: 98.5 (01 Jun 2018 20:13)  HR: 76 (02 Jun 2018 06:30) (71 - 87)  BP: 134/73 (02 Jun 2018 06:30) (109/68 - 134/73)  BP(mean): --  RR: 18 (02 Jun 2018 06:30) (18 - 20)  SpO2: 95% (02 Jun 2018 06:30) (95% - 97%)  I&O's Summary    01 Jun 2018 07:01  -  02 Jun 2018 07:00  --------------------------------------------------------  IN: 1077 mL / OUT: 1000 mL / NET: 77 mL        PE:  GENERAL: NAD, AAOx3  HEAD:  Atraumatic, Normocephalic  EYES: EOMI, PERRLA, conjunctiva and sclera clear  NECK: Supple, No JVD  CHEST/LUNG: CTABL, No wheeze  HEART: Regular rate and rhythm; no murmur  ABDOMEN: Soft, Nontender, Nondistended; Bowel sounds present, lower abd wound wound vac  EXTREMITIES:  2+ Peripheral Pulses, right thigh wound c wound vac, right knee debrided, left thigh small wound packed  SKIN: No rashes or lesions  NEURO: No focal deficits      LABS:                        8.9    12.2  )-----------( 277      ( 01 Jun 2018 07:30 )             28.5     06-02    141  |  99  |  48<H>  ----------------------------<  106<H>  5.0   |  31  |  0.86    Ca    10.0      02 Jun 2018 08:18    TPro  7.1  /  Alb  2.7<L>  /  TBili  0.2  /  DBili  x   /  AST  11  /  ALT  10  /  AlkPhos  93  06-02    PT/INR - ( 02 Jun 2018 08:18 )   PT: 33.0 sec;   INR: 2.99 ratio           CAPILLARY BLOOD GLUCOSE                RADIOLOGY & ADDITIONAL TESTS:    Imaging Personally Reviewed:  [x] YES  [ ] NO    Consultant(s) Notes Reviewed:  [x] YES  [ ] NO    MEDICATIONS  (STANDING):  ascorbic acid 500 milliGRAM(s) Oral daily  aspirin enteric coated 81 milliGRAM(s) Oral daily  buDESOnide 160 MICROgram(s)/formoterol 4.5 MICROgram(s) Inhaler 2 Puff(s) Inhalation two times a day  clotrimazole/betamethasone Cream 1 Application(s) Topical two times a day  Dakins Solution - 1/4 Strength 1 Application(s) Topical two times a day  docusate sodium 100 milliGRAM(s) Oral three times a day  epoetin jenni Injectable 09839 Unit(s) SubCutaneous every 7 days  ferrous    sulfate 325 milliGRAM(s) Oral daily  folic acid 1 milliGRAM(s) Oral daily  gabapentin 200 milliGRAM(s) Oral three times a day  melatonin 3 milliGRAM(s) Oral at bedtime  minocycline 100 milliGRAM(s) Oral two times a day  multivitamin 1 Tablet(s) Oral daily  mupirocin 2% Ointment 1 Application(s) Topical two times a day  nystatin    Suspension 663541 Unit(s) Oral every 6 hours  pantoprazole    Tablet 40 milliGRAM(s) Oral before breakfast  polyethylene glycol 3350 17 Gram(s) Oral daily  povidone iodine 10% Solution 1 Application(s) Topical every 12 hours  tiotropium 18 MICROgram(s) Capsule 1 Capsule(s) Inhalation daily  zinc oxide 20% Ointment 1 Application(s) Topical daily    MEDICATIONS  (PRN):  acetaminophen   Tablet 650 milliGRAM(s) Oral every 6 hours PRN For Temp greater than 38 C (100.4 F)  acetaminophen   Tablet. 650 milliGRAM(s) Oral every 6 hours PRN Mild Pain (1 - 3)  bisacodyl Suppository 10 milliGRAM(s) Rectal daily PRN Constipation  HYDROmorphone   Tablet 4 milliGRAM(s) Oral every 4 hours PRN Severe Pain (7 - 10)  HYDROmorphone   Tablet 2 milliGRAM(s) Oral every 4 hours PRN Moderate Pain (4 - 6)  lidocaine 2% Gel 1 Application(s) Topical daily PRN dressing change  lidocaine 2% Viscous 10 milliLiter(s) Swish and Spit every 6 hours PRN Mouth Sores  senna 2 Tablet(s) Oral at bedtime PRN Constipation      Care Discussed with Consultants/Other Providers [x] YES  [ ] NO    HEALTH ISSUES - PROBLEM Dx:  Skin ulcer, unspecified ulcer stage: Skin ulcer, unspecified ulcer stage  Pain: Pain  Adjustment disorder, unspecified type  Delirium due to another medical condition  Pneumonia: Pneumonia  Aortic stenosis, severe: Aortic stenosis, severe  MYAH (obstructive sleep apnea): MYAH (obstructive sleep apnea)  Obesity: Obesity  Asthma: Asthma  SOB (shortness of breath): SOB (shortness of breath)  Chronic deep vein thrombosis (DVT) of lower extremity, unspecified laterality, unspecified vein: Chronic deep vein thrombosis (DVT) of lower extremity, unspecified laterality, unspecified vein  Arthralgia of knee, unspecified laterality: Arthralgia of knee, unspecified laterality  Hyperlipidemia, unspecified hyperlipidemia type: Hyperlipidemia, unspecified hyperlipidemia type  Asthma, unspecified asthma severity, unspecified whether complicated, unspecified whether persistent: Asthma, unspecified asthma severity, unspecified whether complicated, unspecified whether persistent  Coronary artery disease involving native coronary artery of native heart without angina pectoris: Coronary artery disease involving native coronary artery of native heart without angina pectoris  Gastroesophageal reflux disease, esophagitis presence not specified: Gastroesophageal reflux disease, esophagitis presence not specified  Fever, unspecified fever cause: Fever, unspecified fever cause

## 2018-06-02 NOTE — PROGRESS NOTE ADULT - SUBJECTIVE AND OBJECTIVE BOX
Pt is a 77 year old female who was admitted several weeks ago with aspiration pneumonia and fever with right knee pain. She also had proximal thigh swelling and there was a hematoma of the leg as well. She has a history of PMR on steroids. She also has a history of tavr, AICD andf remote bilateral TKR and was found to have strep bacteremia and a ERIKA was negative and she also had a eschar of the right knee as well. She was placed on iv antibiotics and I understand that she will be completing the antibiotics, daptomycin on this date. She told me that she had anemia in the distant past and was placed on antibiotics. She is being seen by the plastics service as well. A ct of th femur was not remarkable for infection and a ct of the chest done several weeks ago showed bibasilar PNA. She was also noted to have a left renal lesion that is unchanged now since 12/2016.     The consult was requested to see if there is anything we can do for her anemia. The counts on the day of this admission was white cell count of 9.71 hemoglobin 8.4 hematocrit 26.4 MCV 86 and MCHC 31.8 and platelet count of [id number] the diff count was 74 polys 15 lymphs and 7 monos the bun was 9 and the creatinine was 0.6     5/4 the pt is stable and the iron studies are still pending at this time. The hemoglobin was 8.4. 5/7 the pt was seen and the notes over charu last few days. The pt will in the am have ferritin and iron studies sent off. 5/8 the ferritin came back as over 300 and therefore she is not iron deficient and the anemia is consistent with chronic disease. The use of epogen might be considered here if hemoglobin drops and can be implemented to reduce blood transfusions. 5/9 pt clearly looks better and repeat hemoglobin was stable at 8.1  PE weak appearing with female and needed help to do normal daily activities large eschar as described on the lower leg and at the time of my arrival, the wound vac was in place and she was being cared for by the staff.  5/8 the ferritin came back at over 300 and therefore she is not iron deficient and epo can be considered if the hemoglobin drops and is in need of blood support. 5/10 pt being evaluated for rehab and consider epo of hemoglobin drops below the 8 range. 5/11 notes reviewed and will order cbc on the weekend and decide on epo.  pmh as above in the body of the report  5/14 the hemoglobin remains at 8.1 and debridement and wound vac and abdominal wound and thigh wounds were being cared for. 5/15 stable clinically and looks better the hemoglobin without epo is up to 8.5 5/16 notes and labs reviewed and no change from heme point of view.     5/17 hemoglobin down to 8.1 and stable clinically. 5/19 called by Dr Marrero as the pt had a hemoglobin of 7.3 and procrit will be started at a dose [place] 10659 units a week in an attempt to reduce transfusion requirements. 5/21 hemoglobin 7.5 and stable on procrit now notes reviewed. 5/30 stable and being cared for by the staff, the hemoglobin was noted to be 9.1. 6/2 pt stable and notes reviewed hemoglobin stable at 8.9.  zinc oxide 20% Ointment 1 Application(s) Topical daily   PE being cared for by the staff and appeared to be stable.                           8.9    12.2  )-----------( 277      ( 01 Jun 2018 07:30 )             28.5   MEDICATIONS  (STANDING):  ascorbic acid 500 milliGRAM(s) Oral daily  aspirin enteric coated 81 milliGRAM(s) Oral daily  buDESOnide 160 MICROgram(s)/formoterol 4.5 MICROgram(s) Inhaler 2 Puff(s) Inhalation two times a day  clotrimazole/betamethasone Cream 1 Application(s) Topical two times a day  Dakins Solution - 1/4 Strength 1 Application(s) Topical two times a day  docusate sodium 100 milliGRAM(s) Oral three times a day  epoetin jenni Injectable 68485 Unit(s) SubCutaneous every 7 days  ferrous    sulfate 325 milliGRAM(s) Oral daily  folic acid 1 milliGRAM(s) Oral daily  gabapentin 200 milliGRAM(s) Oral three times a day  melatonin 3 milliGRAM(s) Oral at bedtime  minocycline 100 milliGRAM(s) Oral two times a day  multivitamin 1 Tablet(s) Oral daily  mupirocin 2% Ointment 1 Application(s) Topical two times a day  nystatin    Suspension 072450 Unit(s) Oral every 6 hours  pantoprazole    Tablet 40 milliGRAM(s) Oral before breakfast  polyethylene glycol 3350 17 Gram(s) Oral daily  povidone iodine 10% Solution 1 Application(s) Topical every 12 hours  tiotropium 18 MICROgram(s) Capsule 1 Capsule(s) Inhalation daily  zinc oxide 20% Ointment 1 Application(s) Topical daily

## 2018-06-02 NOTE — PROVIDER CONTACT NOTE (CHANGE IN STATUS NOTIFICATION) - SITUATION
Upon changing right outer thigh dressing, moderate amounts of brown/pink fluid leaking from wound. Yellow chunks also falling out of wound. Dressing changed as per MD orders.

## 2018-06-02 NOTE — PROGRESS NOTE ADULT - ASSESSMENT
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     # Recent septic Rt TKR - abx spacer exchanged and plastics complex wound closure  # PMR (off steroids to allow for wound healing)  # right knee eschar, right thigh necrosis and abd wound, s/p multiple debridements  # Multiple skin ulcers  # Anemia chronic disease    Plan:  wound care and wound vac per plastics  multiple ulcerated wounds: evaluated by derm, punch bx Cx-ngtd and path negative for pyoderma gangrenosum  remains afebrile, cont suppressive minocycline  cont coumadin and asa  Pain control  PT OOB no knee motion, immobilizer if out of bed, has bariatric chair  bowel regimen    DC planning to rehab    dw patient who is agreeable to plan of care

## 2018-06-02 NOTE — PROGRESS NOTE ADULT - ASSESSMENT
77 year old female s/p rudy from right knee and spacer for strept infection then returned with nonhealing wound and had spacer change, fusion ruthann, plastic closure and grew mrsa.   Wound developed necrotic skin, then pneumonia, and hematoma over the right thigh with necrosis of skin too.   Finished full course of iv abx, on suppressive minocycline for the knee. Pneumonia treated.  Debrided thigh and abdomen wound with VAC.  Dermatology performed 4mm punch bx performed of R thigh- cx grew enterobacter and morganella, suggestive of colonizing roopa in a patient with prolonged hospital stay,   Path as reviewed above- inflammatory infiltrate, no features of pyoderma in sample.Appreciate dermatology f/u, no support for PG at this point.  Afebrile, stable WBC, mild leukocytosis seems nonspecific at this time.  She has been stable the past few weeks.  Plan:   Continue suppressive minocycline, no signs of drug toxicity   Local wound care likely to require most time/intervention, and remain most debilitating  R knee eschar partially removed, no gross infection is apparent  [place] guarded, appreciate plans of Dr Marrero

## 2018-06-03 LAB
HCT VFR BLD CALC: 28.5 % — LOW (ref 34.5–45)
HGB BLD-MCNC: 8.1 G/DL — LOW (ref 11.5–15.5)
INR BLD: 2.75 RATIO — HIGH (ref 0.88–1.16)
MCHC RBC-ENTMCNC: 25.9 PG — LOW (ref 27–34)
MCHC RBC-ENTMCNC: 28.4 GM/DL — LOW (ref 32–36)
MCV RBC AUTO: 91.1 FL — SIGNIFICANT CHANGE UP (ref 80–100)
PLATELET # BLD AUTO: 284 K/UL — SIGNIFICANT CHANGE UP (ref 150–400)
PROTHROM AB SERPL-ACNC: 30.6 SEC — HIGH (ref 9.8–12.7)
RBC # BLD: 3.13 M/UL — LOW (ref 3.8–5.2)
RBC # FLD: 18.6 % — HIGH (ref 10.3–14.5)
WBC # BLD: 11.34 K/UL — HIGH (ref 3.8–10.5)
WBC # FLD AUTO: 11.34 K/UL — HIGH (ref 3.8–10.5)

## 2018-06-03 RX ORDER — WARFARIN SODIUM 2.5 MG/1
1 TABLET ORAL ONCE
Qty: 0 | Refills: 0 | Status: COMPLETED | OUTPATIENT
Start: 2018-06-03 | End: 2018-06-03

## 2018-06-03 RX ADMIN — Medication 500000 UNIT(S): at 18:20

## 2018-06-03 RX ADMIN — HYDROMORPHONE HYDROCHLORIDE 4 MILLIGRAM(S): 2 INJECTION INTRAMUSCULAR; INTRAVENOUS; SUBCUTANEOUS at 22:46

## 2018-06-03 RX ADMIN — MUPIROCIN 1 APPLICATION(S): 20 OINTMENT TOPICAL at 06:44

## 2018-06-03 RX ADMIN — Medication 500000 UNIT(S): at 05:56

## 2018-06-03 RX ADMIN — HYDROMORPHONE HYDROCHLORIDE 2 MILLIGRAM(S): 2 INJECTION INTRAMUSCULAR; INTRAVENOUS; SUBCUTANEOUS at 05:54

## 2018-06-03 RX ADMIN — Medication 500000 UNIT(S): at 22:47

## 2018-06-03 RX ADMIN — GABAPENTIN 200 MILLIGRAM(S): 400 CAPSULE ORAL at 05:54

## 2018-06-03 RX ADMIN — Medication 1 APPLICATION(S): at 05:56

## 2018-06-03 RX ADMIN — BUDESONIDE AND FORMOTEROL FUMARATE DIHYDRATE 2 PUFF(S): 160; 4.5 AEROSOL RESPIRATORY (INHALATION) at 18:20

## 2018-06-03 RX ADMIN — MUPIROCIN 1 APPLICATION(S): 20 OINTMENT TOPICAL at 18:22

## 2018-06-03 RX ADMIN — Medication 1 APPLICATION(S): at 18:21

## 2018-06-03 RX ADMIN — HYDROMORPHONE HYDROCHLORIDE 4 MILLIGRAM(S): 2 INJECTION INTRAMUSCULAR; INTRAVENOUS; SUBCUTANEOUS at 12:39

## 2018-06-03 RX ADMIN — POLYETHYLENE GLYCOL 3350 17 GRAM(S): 17 POWDER, FOR SOLUTION ORAL at 13:35

## 2018-06-03 RX ADMIN — Medication 3 MILLIGRAM(S): at 22:46

## 2018-06-03 RX ADMIN — HYDROMORPHONE HYDROCHLORIDE 4 MILLIGRAM(S): 2 INJECTION INTRAMUSCULAR; INTRAVENOUS; SUBCUTANEOUS at 23:34

## 2018-06-03 RX ADMIN — TIOTROPIUM BROMIDE 1 CAPSULE(S): 18 CAPSULE ORAL; RESPIRATORY (INHALATION) at 13:36

## 2018-06-03 RX ADMIN — Medication 100 MILLIGRAM(S): at 05:54

## 2018-06-03 RX ADMIN — HYDROMORPHONE HYDROCHLORIDE 4 MILLIGRAM(S): 2 INJECTION INTRAMUSCULAR; INTRAVENOUS; SUBCUTANEOUS at 13:30

## 2018-06-03 RX ADMIN — Medication 1 MILLIGRAM(S): at 13:36

## 2018-06-03 RX ADMIN — Medication 325 MILLIGRAM(S): at 13:36

## 2018-06-03 RX ADMIN — PANTOPRAZOLE SODIUM 40 MILLIGRAM(S): 20 TABLET, DELAYED RELEASE ORAL at 05:55

## 2018-06-03 RX ADMIN — Medication 1 APPLICATION(S): at 05:55

## 2018-06-03 RX ADMIN — HYDROMORPHONE HYDROCHLORIDE 2 MILLIGRAM(S): 2 INJECTION INTRAMUSCULAR; INTRAVENOUS; SUBCUTANEOUS at 06:30

## 2018-06-03 RX ADMIN — GABAPENTIN 200 MILLIGRAM(S): 400 CAPSULE ORAL at 22:46

## 2018-06-03 RX ADMIN — Medication 500 MILLIGRAM(S): at 13:36

## 2018-06-03 RX ADMIN — BUDESONIDE AND FORMOTEROL FUMARATE DIHYDRATE 2 PUFF(S): 160; 4.5 AEROSOL RESPIRATORY (INHALATION) at 05:55

## 2018-06-03 RX ADMIN — MINOCYCLINE HYDROCHLORIDE 100 MILLIGRAM(S): 45 TABLET, EXTENDED RELEASE ORAL at 18:20

## 2018-06-03 RX ADMIN — Medication 100 MILLIGRAM(S): at 13:36

## 2018-06-03 RX ADMIN — Medication 1 TABLET(S): at 13:36

## 2018-06-03 RX ADMIN — SENNA PLUS 2 TABLET(S): 8.6 TABLET ORAL at 22:46

## 2018-06-03 RX ADMIN — Medication 100 MILLIGRAM(S): at 22:46

## 2018-06-03 RX ADMIN — Medication 500000 UNIT(S): at 13:35

## 2018-06-03 RX ADMIN — WARFARIN SODIUM 1 MILLIGRAM(S): 2.5 TABLET ORAL at 22:46

## 2018-06-03 RX ADMIN — ZINC OXIDE 1 APPLICATION(S): 200 OINTMENT TOPICAL at 13:37

## 2018-06-03 RX ADMIN — CLOTRIMAZOLE AND BETAMETHASONE DIPROPIONATE 1 APPLICATION(S): 10; .5 CREAM TOPICAL at 18:20

## 2018-06-03 RX ADMIN — Medication 81 MILLIGRAM(S): at 13:36

## 2018-06-03 RX ADMIN — CLOTRIMAZOLE AND BETAMETHASONE DIPROPIONATE 1 APPLICATION(S): 10; .5 CREAM TOPICAL at 05:55

## 2018-06-03 RX ADMIN — Medication 1 APPLICATION(S): at 18:19

## 2018-06-03 RX ADMIN — GABAPENTIN 200 MILLIGRAM(S): 400 CAPSULE ORAL at 13:36

## 2018-06-03 RX ADMIN — MINOCYCLINE HYDROCHLORIDE 100 MILLIGRAM(S): 45 TABLET, EXTENDED RELEASE ORAL at 05:54

## 2018-06-03 RX ADMIN — ERYTHROPOIETIN 20000 UNIT(S): 10000 INJECTION, SOLUTION INTRAVENOUS; SUBCUTANEOUS at 18:29

## 2018-06-03 NOTE — PROGRESS NOTE ADULT - SUBJECTIVE AND OBJECTIVE BOX
Patient is a 77y old  Female who presents with a chief complaint of fever (11 Apr 2018 14:25)      SUBJECTIVE / OVERNIGHT EVENTS:    Patient seen and examined. pt denies pain, cp, sob, nvd. some subcutaneous tissue fell off during dressing change right thigh. was out of bed yesterday in bariatric chair for 4 hrs.      Vital Signs Last 24 Hrs  T(C): 36.7 (03 Jun 2018 05:53), Max: 36.7 (03 Jun 2018 01:08)  T(F): 98.1 (03 Jun 2018 05:53), Max: 98.1 (03 Jun 2018 05:53)  HR: 82 (03 Jun 2018 05:53) (77 - 84)  BP: 115/64 (03 Jun 2018 05:53) (107/70 - 124/69)  BP(mean): --  RR: 18 (03 Jun 2018 05:53) (18 - 20)  SpO2: 97% (03 Jun 2018 05:53) (91% - 97%)  I&O's Summary    02 Jun 2018 07:01  -  03 Jun 2018 07:00  --------------------------------------------------------  IN: 1040 mL / OUT: 325 mL / NET: 715 mL        PE:  GENERAL: NAD, AAOx3  HEAD:  Atraumatic, Normocephalic  EYES: EOMI, PERRLA, conjunctiva and sclera clear  NECK: Supple, No JVD  CHEST/LUNG: CTABL, No wheeze  HEART: Regular rate and rhythm; no murmur  ABDOMEN: Soft, Nontender, Nondistended; Bowel sounds present, lower abd wound wound vac  EXTREMITIES:  2+ Peripheral Pulses, right thigh wound c wound vac, right knee debrided, left thigh small wound packed  SKIN: No rashes or lesions  NEURO: No focal deficits      LABS:                        8.3    11.00 )-----------( 274      ( 02 Jun 2018 10:43 )             28.5     06-02    141  |  99  |  48<H>  ----------------------------<  106<H>  5.0   |  31  |  0.86    Ca    10.0      02 Jun 2018 08:18    TPro  7.1  /  Alb  2.7<L>  /  TBili  0.2  /  DBili  x   /  AST  11  /  ALT  10  /  AlkPhos  93  06-02    PT/INR - ( 03 Jun 2018 06:37 )   PT: 30.6 sec;   INR: 2.75 ratio           CAPILLARY BLOOD GLUCOSE                RADIOLOGY & ADDITIONAL TESTS:    Imaging Personally Reviewed:  [x] YES  [ ] NO    Consultant(s) Notes Reviewed:  [x] YES  [ ] NO    MEDICATIONS  (STANDING):  ascorbic acid 500 milliGRAM(s) Oral daily  aspirin enteric coated 81 milliGRAM(s) Oral daily  buDESOnide 160 MICROgram(s)/formoterol 4.5 MICROgram(s) Inhaler 2 Puff(s) Inhalation two times a day  clotrimazole/betamethasone Cream 1 Application(s) Topical two times a day  Dakins Solution - 1/4 Strength 1 Application(s) Topical two times a day  docusate sodium 100 milliGRAM(s) Oral three times a day  epoetin jenni Injectable 48532 Unit(s) SubCutaneous every 7 days  ferrous    sulfate 325 milliGRAM(s) Oral daily  folic acid 1 milliGRAM(s) Oral daily  gabapentin 200 milliGRAM(s) Oral three times a day  melatonin 3 milliGRAM(s) Oral at bedtime  minocycline 100 milliGRAM(s) Oral two times a day  multivitamin 1 Tablet(s) Oral daily  mupirocin 2% Ointment 1 Application(s) Topical two times a day  nystatin    Suspension 302634 Unit(s) Oral every 6 hours  pantoprazole    Tablet 40 milliGRAM(s) Oral before breakfast  polyethylene glycol 3350 17 Gram(s) Oral daily  povidone iodine 10% Solution 1 Application(s) Topical every 12 hours  tiotropium 18 MICROgram(s) Capsule 1 Capsule(s) Inhalation daily  zinc oxide 20% Ointment 1 Application(s) Topical daily    MEDICATIONS  (PRN):  acetaminophen   Tablet 650 milliGRAM(s) Oral every 6 hours PRN For Temp greater than 38 C (100.4 F)  acetaminophen   Tablet. 650 milliGRAM(s) Oral every 6 hours PRN Mild Pain (1 - 3)  bisacodyl Suppository 10 milliGRAM(s) Rectal daily PRN Constipation  HYDROmorphone   Tablet 4 milliGRAM(s) Oral every 4 hours PRN Severe Pain (7 - 10)  HYDROmorphone   Tablet 2 milliGRAM(s) Oral every 4 hours PRN Moderate Pain (4 - 6)  lidocaine 2% Gel 1 Application(s) Topical daily PRN dressing change  lidocaine 2% Viscous 10 milliLiter(s) Swish and Spit every 6 hours PRN Mouth Sores  senna 2 Tablet(s) Oral at bedtime PRN Constipation      Care Discussed with Consultants/Other Providers [x] YES  [ ] NO    HEALTH ISSUES - PROBLEM Dx:  Skin ulcer, unspecified ulcer stage: Skin ulcer, unspecified ulcer stage  Pain: Pain  Adjustment disorder, unspecified type  Delirium due to another medical condition  Pneumonia: Pneumonia  Aortic stenosis, severe: Aortic stenosis, severe  MYAH (obstructive sleep apnea): MYAH (obstructive sleep apnea)  Obesity: Obesity  Asthma: Asthma  SOB (shortness of breath): SOB (shortness of breath)  Chronic deep vein thrombosis (DVT) of lower extremity, unspecified laterality, unspecified vein: Chronic deep vein thrombosis (DVT) of lower extremity, unspecified laterality, unspecified vein  Arthralgia of knee, unspecified laterality: Arthralgia of knee, unspecified laterality  Hyperlipidemia, unspecified hyperlipidemia type: Hyperlipidemia, unspecified hyperlipidemia type  Asthma, unspecified asthma severity, unspecified whether complicated, unspecified whether persistent: Asthma, unspecified asthma severity, unspecified whether complicated, unspecified whether persistent  Coronary artery disease involving native coronary artery of native heart without angina pectoris: Coronary artery disease involving native coronary artery of native heart without angina pectoris  Gastroesophageal reflux disease, esophagitis presence not specified: Gastroesophageal reflux disease, esophagitis presence not specified  Fever, unspecified fever cause: Fever, unspecified fever cause

## 2018-06-03 NOTE — PROGRESS NOTE ADULT - SUBJECTIVE AND OBJECTIVE BOX
CC: f/u for infected right knee and leukocytosis    Patient reports no new complaints.She is with pain when wound vac's changed    REVIEW OF SYSTEMS:  All other review of systems negative (Comprehensive ROS)    Antimicrobials Day #  :long term  minocycline 100 milliGRAM(s) Oral two times a day  nystatin    Suspension 564174 Unit(s) Oral every 6 hours    Other Medications Reviewed    T(F): 98.1 (06-03-18 @ 05:53), Max: 98.1 (06-03-18 @ 05:53)  HR: 82 (06-03-18 @ 05:53)  BP: 115/64 (06-03-18 @ 05:53)  RR: 18 (06-03-18 @ 05:53)  SpO2: 97% (06-03-18 @ 05:53)  Wt(kg): --    PHYSICAL EXAM:  General: alert, no acute distress  Eyes:  anicteric, no conjunctival injection, no discharge  Oropharynx: no lesions or injection 	  Neck: supple, without adenopathy  Lungs: clear to auscultation, diminished at bases  Heart: regular rate and rhythm; no murmur, rubs or gallops  Abdomen: soft, nondistended, nontender, without mass or organomegaly  Skin: lower abdominal and right thigh wound vac's in place, no secondary cellulitis.  Extremities: no clubbing, cyanosis, + edema.Rt knee with open ulcer,clean  Neurologic: alert, oriented, moves all extremities    LAB RESULTS:                        8.1    11.34 )-----------( 284      ( 03 Jun 2018 08:41 )             28.5     06-02    141  |  99  |  48<H>  ----------------------------<  106<H>  5.0   |  31  |  0.86    Ca    10.0      02 Jun 2018 08:18    TPro  7.1  /  Alb  2.7<L>  /  TBili  0.2  /  DBili  x   /  AST  11  /  ALT  10  /  AlkPhos  93  06-02    LIVER FUNCTIONS - ( 02 Jun 2018 08:18 )  Alb: 2.7 g/dL / Pro: 7.1 g/dL / ALK PHOS: 93 U/L / ALT: 10 U/L / AST: 11 U/L / GGT: x             MICROBIOLOGY:  RECENT CULTURES:      RADIOLOGY REVIEWED:

## 2018-06-03 NOTE — PROGRESS NOTE ADULT - ASSESSMENT
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     # Recent septic Rt TKR - abx spacer exchanged and plastics complex wound closure  # PMR (off steroids to allow for wound healing)  # right knee eschar, right thigh necrosis and abd wound, s/p multiple debridements  # Multiple skin ulcers  # Anemia chronic disease    Plan:  wound care fu and wound vac per plastics  multiple ulcerated wounds: evaluated by derm, punch bx Cx-ngtd and path negative for pyoderma gangrenosum  remains afebrile, cont suppressive minocycline  cont coumadin and asa  Pain control  PT OOB no knee motion, immobilizer if out of bed, has bariatric chair  bowel regimen    DC planning to rehab    dw patient

## 2018-06-03 NOTE — PROGRESS NOTE ADULT - ASSESSMENT
77 year old female s/p rudy from right knee and spacer for strept infection then returned with nonhealing wound and had spacer change, fusion ruthann, plastic closure and grew mrsa.   Wound developed necrotic skin, then pneumonia, and hematoma over the right thigh with necrosis of skin too.   Finished full course of iv abx, on suppressive minocycline for the knee. Pneumonia treated.  Debrided thigh and abdomen wound with VAC.  Dermatology performed 4mm punch bx performed of R thigh- cx grew enterobacter and morganella, suggestive of colonizing roopa in a patient with prolonged hospital stay,   Path as reviewed above- inflammatory infiltrate, no features of pyoderma in sample.Appreciate dermatology f/u, no support for PG at this point.  Afebrile, stable WBC, mild leukocytosis seems nonspecific at this time.It has been moderating.  She has been stable the past few weeks.  Plan:   Continue suppressive minocycline, no signs of drug toxicity   Local wound care likely to require most time/intervention, and remain most debilitating  R knee eschar partially removed, no gross infection is apparent, wound appears stable  Lilly guarded, appreciate plans of Dr Marrero and close medical follwup

## 2018-06-04 LAB
INR BLD: 2.31 RATIO — HIGH (ref 0.88–1.16)
PROTHROM AB SERPL-ACNC: 26.6 SEC — HIGH (ref 10–13.1)
SURGICAL PATHOLOGY STUDY: SIGNIFICANT CHANGE UP

## 2018-06-04 RX ORDER — WARFARIN SODIUM 2.5 MG/1
0.5 TABLET ORAL ONCE
Qty: 0 | Refills: 0 | Status: COMPLETED | OUTPATIENT
Start: 2018-06-04 | End: 2018-06-04

## 2018-06-04 RX ORDER — HYDROMORPHONE HYDROCHLORIDE 2 MG/ML
2 INJECTION INTRAMUSCULAR; INTRAVENOUS; SUBCUTANEOUS EVERY 4 HOURS
Qty: 0 | Refills: 0 | Status: DISCONTINUED | OUTPATIENT
Start: 2018-06-04 | End: 2018-06-06

## 2018-06-04 RX ORDER — HYDROMORPHONE HYDROCHLORIDE 2 MG/ML
4 INJECTION INTRAMUSCULAR; INTRAVENOUS; SUBCUTANEOUS EVERY 4 HOURS
Qty: 0 | Refills: 0 | Status: DISCONTINUED | OUTPATIENT
Start: 2018-06-04 | End: 2018-06-06

## 2018-06-04 RX ORDER — HYDROMORPHONE HYDROCHLORIDE 2 MG/ML
0.5 INJECTION INTRAMUSCULAR; INTRAVENOUS; SUBCUTANEOUS ONCE
Qty: 0 | Refills: 0 | Status: DISCONTINUED | OUTPATIENT
Start: 2018-06-04 | End: 2018-06-04

## 2018-06-04 RX ADMIN — HYDROMORPHONE HYDROCHLORIDE 2 MILLIGRAM(S): 2 INJECTION INTRAMUSCULAR; INTRAVENOUS; SUBCUTANEOUS at 06:41

## 2018-06-04 RX ADMIN — CLOTRIMAZOLE AND BETAMETHASONE DIPROPIONATE 1 APPLICATION(S): 10; .5 CREAM TOPICAL at 18:17

## 2018-06-04 RX ADMIN — BUDESONIDE AND FORMOTEROL FUMARATE DIHYDRATE 2 PUFF(S): 160; 4.5 AEROSOL RESPIRATORY (INHALATION) at 18:17

## 2018-06-04 RX ADMIN — MINOCYCLINE HYDROCHLORIDE 100 MILLIGRAM(S): 45 TABLET, EXTENDED RELEASE ORAL at 06:09

## 2018-06-04 RX ADMIN — Medication 325 MILLIGRAM(S): at 14:11

## 2018-06-04 RX ADMIN — Medication 1 APPLICATION(S): at 06:09

## 2018-06-04 RX ADMIN — HYDROMORPHONE HYDROCHLORIDE 0.5 MILLIGRAM(S): 2 INJECTION INTRAMUSCULAR; INTRAVENOUS; SUBCUTANEOUS at 10:00

## 2018-06-04 RX ADMIN — BUDESONIDE AND FORMOTEROL FUMARATE DIHYDRATE 2 PUFF(S): 160; 4.5 AEROSOL RESPIRATORY (INHALATION) at 06:08

## 2018-06-04 RX ADMIN — Medication 100 MILLIGRAM(S): at 14:15

## 2018-06-04 RX ADMIN — POLYETHYLENE GLYCOL 3350 17 GRAM(S): 17 POWDER, FOR SOLUTION ORAL at 14:11

## 2018-06-04 RX ADMIN — Medication 500000 UNIT(S): at 14:16

## 2018-06-04 RX ADMIN — WARFARIN SODIUM 0.5 MILLIGRAM(S): 2.5 TABLET ORAL at 23:21

## 2018-06-04 RX ADMIN — Medication 1 TABLET(S): at 14:11

## 2018-06-04 RX ADMIN — GABAPENTIN 200 MILLIGRAM(S): 400 CAPSULE ORAL at 14:11

## 2018-06-04 RX ADMIN — Medication 500000 UNIT(S): at 18:17

## 2018-06-04 RX ADMIN — Medication 81 MILLIGRAM(S): at 14:11

## 2018-06-04 RX ADMIN — MINOCYCLINE HYDROCHLORIDE 100 MILLIGRAM(S): 45 TABLET, EXTENDED RELEASE ORAL at 18:17

## 2018-06-04 RX ADMIN — Medication 1 APPLICATION(S): at 18:18

## 2018-06-04 RX ADMIN — Medication 100 MILLIGRAM(S): at 23:21

## 2018-06-04 RX ADMIN — HYDROMORPHONE HYDROCHLORIDE 0.5 MILLIGRAM(S): 2 INJECTION INTRAMUSCULAR; INTRAVENOUS; SUBCUTANEOUS at 10:30

## 2018-06-04 RX ADMIN — GABAPENTIN 200 MILLIGRAM(S): 400 CAPSULE ORAL at 23:21

## 2018-06-04 RX ADMIN — Medication 500 MILLIGRAM(S): at 14:12

## 2018-06-04 RX ADMIN — ZINC OXIDE 1 APPLICATION(S): 200 OINTMENT TOPICAL at 14:15

## 2018-06-04 RX ADMIN — GABAPENTIN 200 MILLIGRAM(S): 400 CAPSULE ORAL at 06:09

## 2018-06-04 RX ADMIN — HYDROMORPHONE HYDROCHLORIDE 2 MILLIGRAM(S): 2 INJECTION INTRAMUSCULAR; INTRAVENOUS; SUBCUTANEOUS at 06:08

## 2018-06-04 RX ADMIN — Medication 1 APPLICATION(S): at 18:17

## 2018-06-04 RX ADMIN — PANTOPRAZOLE SODIUM 40 MILLIGRAM(S): 20 TABLET, DELAYED RELEASE ORAL at 06:09

## 2018-06-04 RX ADMIN — TIOTROPIUM BROMIDE 1 CAPSULE(S): 18 CAPSULE ORAL; RESPIRATORY (INHALATION) at 14:12

## 2018-06-04 RX ADMIN — Medication 500000 UNIT(S): at 06:08

## 2018-06-04 RX ADMIN — Medication 1 MILLIGRAM(S): at 14:11

## 2018-06-04 RX ADMIN — Medication 100 MILLIGRAM(S): at 06:09

## 2018-06-04 RX ADMIN — MUPIROCIN 1 APPLICATION(S): 20 OINTMENT TOPICAL at 18:51

## 2018-06-04 RX ADMIN — Medication 3 MILLIGRAM(S): at 23:21

## 2018-06-04 RX ADMIN — CLOTRIMAZOLE AND BETAMETHASONE DIPROPIONATE 1 APPLICATION(S): 10; .5 CREAM TOPICAL at 06:09

## 2018-06-04 RX ADMIN — MUPIROCIN 1 APPLICATION(S): 20 OINTMENT TOPICAL at 18:17

## 2018-06-04 NOTE — PROGRESS NOTE ADULT - ASSESSMENT
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     # septic Rt TKR - abx spacer exchanged and plastics complex wound closure  # PMR (off steroids to allow for wound healing)  # right knee eschar, right thigh necrosis and abd wound, s/p multiple debridements  # Anemia chronic disease    Plan:  wound care fu and wound vac per plastics  multiple ulcerated wounds: evaluated by derm, punch bx Cx-ngtd and path negative for pyoderma gangrenosum  remains afebrile, cont suppressive minocycline  cont coumadin and asa  Pain control  PT OOB no knee motion, immobilizer if out of bed, has bariatric chair  bowel regimen    DC planning to rehab    dw patient 78 yo F PMHx including HLD, GERD, Anxiety, Anemia, CAD s/p HERBERT, severe AS (s/p TAVR & PPM 12/17 [place] [employer] Model [id number]), 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     # septic Rt TKR - abx spacer exchanged and plastics complex wound closure  # PMR (off steroids to allow for wound healing)  # right knee eschar, right thigh necrosis and abd wound, s/p multiple debridements  # Anemia chronic disease    Plan:  wound care fu and wound vac per plastics  cleared for DC by plastics  multiple ulcerated wounds: evaluated by derm, punch bx Cx-ngtd and path negative for pyoderma gangrenosum  remains afebrile, cont suppressive minocycline  cont coumadin and asa  Pain control  PT OOB no knee motion, immobilizer if out of bed, has bariatric chair  bowel regimen    DC planning to rehab  called  x3 no answer  yasmin hoffman patient 76 yo F PMHx including HLD, GERD, Anxiety, Anemia, CAD s/p HERBERT, severe AS (s/p TAVR & PPM 12/17 [place] [employer] Model [id number]), 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     # septic Rt TKR - abx spacer exchanged and plastics complex wound closure  # PMR (off steroids to allow for wound healing)  # right knee eschar, right thigh necrosis and abd wound, s/p multiple debridements  # Anemia chronic disease    Plan:  wound care fu and wound vac per plastics  cleared for DC by plastics  multiple ulcerated wounds: evaluated by derm, punch bx Cx-ngtd and path negative for pyoderma gangrenosum  remains afebrile, cont suppressive minocycline  cont coumadin and asa  Pain control  PT OOB no knee motion, immobilizer if out of bed, has bariatric chair  bowel regimen    DC planning to rehab  called Mr Rdz [phone] but he did not have time to speak to me  yasmin hoffman patient 76 yo F PMHx including HLD, GERD, Anxiety, Anemia, CAD s/p HERBERT, severe AS (s/p TAVR & PPM 12/17 [place] [employer] Model [id number]), 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     # septic Rt TKR - abx spacer exchanged and plastics complex wound closure  # PMR (off steroids to allow for wound healing)  # right knee eschar, right thigh necrosis and abd wound, s/p multiple debridements  # Anemia chronic disease    Plan:  wound care fu and wound vac per plastics  cleared for DC by plastics  multiple ulcerated wounds: evaluated by derm, punch bx Cx-ngtd and path negative for pyoderma gangrenosum  remains afebrile, cont suppressive minocycline  cont coumadin and asa  Pain control  PT OOB no knee motion, immobilizer if out of bed, has bariatric chair  bowel regimen    DC planning to rehab  dw Mr Rdz [phone] that patient is medically cleared for discharge. Mr Rdz states he only wants [facility] and will be hiring private aids for wound care, He has been speaking to [facility] and he requests a specific room at [facility] on the second floor, He believes that [facility] is what is best for his wife. Advised that patient will need to participate with PT in order to qualify for acute rehab.  yasmin hoffman patient

## 2018-06-04 NOTE — PROGRESS NOTE ADULT - ASSESSMENT
6/2 pt stable and notes reviewed hemoglobin stable at 8.9.   6/4 pt awaiting discharge to [facility] and continues on epo as needed the hemoglobin was noted to be 8.1.

## 2018-06-04 NOTE — PROVIDER CONTACT NOTE (OTHER) - NAME OF MD/NP/PA/DO NOTIFIED:
Dr Walters
ELBERT Faria
ELBERT Faria
Elmer Rodas, NP
KAYLA Gerard
KAYLA Starks
KAYLA Wade
MARSHA Ennis
MARSHA Faria
MARSHA Faria
MARSHA Gibbs
MARSHA Patterson
MARSHA Villanueva
MARSHA Wallace
MARSHA Wallace
NP Angelita
NP Zeta Nellie Gaxiola
PENNY Castillo
Yordy Gaxiola NP
Trena Faria, NP

## 2018-06-04 NOTE — PROVIDER CONTACT NOTE (OTHER) - RECOMMENDATIONS
Continue on antibiotics as prescribed, monitor lesion
Continue plan of care
Continue plan of care, tylenol administered
EKG
MD was asked if xray can be done at bedside
NP Woo Villanueva notified. pt encouraged to take scheduled medications, pt educated on importance of wound  and dressing changes as described pt verbalized understanding of teaching instructions
RN offered to premedicate Pt before positioning but Pt stated "It's my body and I do not want to be moved"
d/w NP interventions, order for nystatin powder
pt education
son requesting pt to be seen by psych again

## 2018-06-04 NOTE — PROVIDER CONTACT NOTE (OTHER) - ACTION/TREATMENT ORDERED:
Provider states it is OK for patient to have pain medication early. Will continue to monitor patient.
ELBERT Faria notifed
ELBERT duenas notified
PENNY Castillo said he will speak to attending in am
NP Joya Wallace aware that pt has temp 100.4, Tylenol 650 mg po given to pt, will continue to monitor pt temp
NP aware. Tylenol 650 mg po given at 2211for temp relief and Pt sent for ct scan. continue to monitor.
Team at bedside for about 20 minutes encouraging pt.  pt refusing and yelling that she is not going.  pt  made aware via phone.  NP on unit and aware.  Kaylene from [facility] updated on pt status.
As per NP Angelita, will see patient at bsd
NP Zeta to notify psych, will continue to monitor and maintain Pt safety
EKG done
MARSHA Villanueva made aware, no new odes received.
MD stated pt has to go down because they need good imaging of the rt knee. informed Pt and she was ok with it- was in pain - medicated pt prior to going down for xray. will monitor.
May remove lund
NP Liya Gibbs aware, to speak with Pt, RN continue to educate and offer turning and positioning
No orders at this time
PA notified and made aware, as PA educate pt. no further interventions at this time. will continue to monitor.
PA to evaluate chart
PA to review chart
provider to come assess. rectal temp and UA ordered
see above

## 2018-06-04 NOTE — PROGRESS NOTE ADULT - SUBJECTIVE AND OBJECTIVE BOX
Plastic Surgery Progress Note (pg [facility]: [id number], [facility]: [phone])    SUBJECTIVE:  The patient was seen and examined. No acute events overnight. Afebrile, vital signs stable.    OBJECTIVE:     ** VITAL SIGNS / I&O's **    Vital Signs Last 24 Hrs  T(C): 36.6 (04 Jun 2018 06:06), Max: 36.8 (04 Jun 2018 01:05)  T(F): 97.8 (04 Jun 2018 06:06), Max: 98.2 (04 Jun 2018 01:05)  HR: 66 (04 Jun 2018 06:06) (66 - 80)  BP: 124/73 (04 Jun 2018 06:06) (105/57 - 124/73)  BP(mean): --  RR: 18 (04 Jun 2018 06:06) (18 - 20)  SpO2: 96% (04 Jun 2018 06:06) (92% - 97%)      03 Jun 2018 07:01  -  04 Jun 2018 07:00  --------------------------------------------------------  IN:    Oral Fluid: 1040 mL  Total IN: 1040 mL    OUT:    VAC (Vacuum Assisted Closure) System: 20 mL    Voided: 901 mL  Total OUT: 921 mL    Total NET: 119 mL          ** PHYSICAL EXAM **    -- CONSTITUTIONAL: Alert, NAD, lying in bed  -- PULM: non-labored respirations  -- EXTREMITIES: RLE knee wound with eschar mostly removed, granulation tissue at base, Wet-to-dry dressing placed. Proximal RLE and abdominal wound vacs in place.      ** LABS **                          8.1    11.34 )-----------( 284      ( 03 Jun 2018 08:41 )             28.5     02 Jun 2018 08:18    141    |  99     |  48     ----------------------------<  106    5.0     |  31     |  0.86     Ca    10.0       02 Jun 2018 08:18    TPro  7.1    /  Alb  2.7    /  TBili  0.2    /  DBili  x      /  AST  11     /  ALT  10     /  AlkPhos  93     02 Jun 2018 08:18    PT/INR - ( 03 Jun 2018 06:37 )   PT: 30.6 sec;   INR: 2.75 ratio           CAPILLARY BLOOD GLUCOSE                    ** MEDICATIONS **  MEDICATIONS  (STANDING):  ascorbic acid 500 milliGRAM(s) Oral daily  aspirin enteric coated 81 milliGRAM(s) Oral daily  buDESOnide 160 MICROgram(s)/formoterol 4.5 MICROgram(s) Inhaler 2 Puff(s) Inhalation two times a day  clotrimazole/betamethasone Cream 1 Application(s) Topical two times a day  Dakins Solution - 1/4 Strength 1 Application(s) Topical two times a day  docusate sodium 100 milliGRAM(s) Oral three times a day  epoetin jenni Injectable 28839 Unit(s) SubCutaneous every 7 days  ferrous    sulfate 325 milliGRAM(s) Oral daily  folic acid 1 milliGRAM(s) Oral daily  gabapentin 200 milliGRAM(s) Oral three times a day  melatonin 3 milliGRAM(s) Oral at bedtime  minocycline 100 milliGRAM(s) Oral two times a day  multivitamin 1 Tablet(s) Oral daily  mupirocin 2% Ointment 1 Application(s) Topical two times a day  nystatin    Suspension 415833 Unit(s) Oral every 6 hours  pantoprazole    Tablet 40 milliGRAM(s) Oral before breakfast  polyethylene glycol 3350 17 Gram(s) Oral daily  povidone iodine 10% Solution 1 Application(s) Topical every 12 hours  tiotropium 18 MICROgram(s) Capsule 1 Capsule(s) Inhalation daily  zinc oxide 20% Ointment 1 Application(s) Topical daily    MEDICATIONS  (PRN):  acetaminophen   Tablet 650 milliGRAM(s) Oral every 6 hours PRN For Temp greater than 38 C (100.4 F)  acetaminophen   Tablet. 650 milliGRAM(s) Oral every 6 hours PRN Mild Pain (1 - 3)  bisacodyl Suppository 10 milliGRAM(s) Rectal daily PRN Constipation  HYDROmorphone   Tablet 4 milliGRAM(s) Oral every 4 hours PRN Severe Pain (7 - 10)  HYDROmorphone   Tablet 2 milliGRAM(s) Oral every 4 hours PRN Moderate Pain (4 - 6)  lidocaine 2% Gel 1 Application(s) Topical daily PRN dressing change  lidocaine 2% Viscous 10 milliLiter(s) Swish and Spit every 6 hours PRN Mouth Sores  senna 2 Tablet(s) Oral at bedtime PRN Constipation

## 2018-06-04 NOTE — PROGRESS NOTE ADULT - ASSESSMENT
A: 77F with complex PMH/PSH who recently had placement of a right knee antibiotic spacer followed by PRS closure c/b skin necrosis and formation of an eschar over the anterior knee joint. The patient was readmitted with fevers and right thigh cellulitis which was complicated by skin breakdown. Patient also developed wound at the right/mid lower quadrant of the abdomen.    P:  >> Dressing change TID to knee  >> Betadine to remaining eschar bid  >> VACs on abdomen and R thigh with a Y-connector with change today  >> No active Rheum. issues- continue to hold steroids  >> Derm- ulcers, not pyoderma gangrenosum  >> Care per primary team.  >> DVT prophylaxis.  >> Protein supplementation of diet to encourage wound healing  >> Cleared for discharge from Plastic Surgery perspective    [facility] Plastic Surgery Pager -- [phone]  [facility] Plastic Surgery Pager -- [phone]

## 2018-06-04 NOTE — PROVIDER CONTACT NOTE (OTHER) - BACKGROUND
Pt admitted for sepsis, MRSA in right leg.
Pt admitted for sepsis. MRSA to right knee an hematoma areas to her right inner thigh. Contact precautions. Pt on Daptomycin IV
pt admitted with sepsis due to unspecified organism, cellulitis of right knee
Admitted for fever, sepsis
Patient admitted with sepsis
Psych on board
RN educated Pt on importance of turning an positioning however Pt became tearful and started hysterically crying; aide at bedside aware
admitted with sepsis. wound complications. MRSA
infected rt knee
interdry used with zinc oxide with no relief
pt a and ox4, pt denies chest pain, denies headache, denies SOB, VSS, pt voids , pt had BM this morning 5/13/18 , no distress noted, pt  at bedside
pt admitted for sepsis
sepsis

## 2018-06-04 NOTE — PROGRESS NOTE ADULT - SUBJECTIVE AND OBJECTIVE BOX
Patient is a 77y old  Female who presents with a chief complaint of fever (11 Apr 2018 14:25)      SUBJECTIVE / OVERNIGHT EVENTS:    Patient seen and examined. denies cp sob. no acute events.      Vital Signs Last 24 Hrs  T(C): 36.6 (04 Jun 2018 06:06), Max: 36.8 (04 Jun 2018 01:05)  T(F): 97.8 (04 Jun 2018 06:06), Max: 98.2 (04 Jun 2018 01:05)  HR: 66 (04 Jun 2018 06:06) (66 - 80)  BP: 124/73 (04 Jun 2018 06:06) (105/57 - 124/73)  BP(mean): --  RR: 18 (04 Jun 2018 06:06) (18 - 20)  SpO2: 96% (04 Jun 2018 06:06) (92% - 97%)  I&O's Summary    03 Jun 2018 07:01  -  04 Jun 2018 07:00  --------------------------------------------------------  IN: 1040 mL / OUT: 920 mL / NET: 120 mL        PE:  GENERAL: NAD, AAOx3, morbidly obese  HEAD:  Atraumatic, Normocephalic  EYES: EOMI, PERRLA, conjunctiva and sclera clear  NECK: Supple, No JVD  CHEST/LUNG: CTABL, No wheeze  HEART: Regular rate and rhythm; no murmur  ABDOMEN: Soft, Nontender, Nondistended; Bowel sounds present, lower abd wound wound vac  EXTREMITIES:  right thigh wound c wound vac, right knee eschar, left lateral thigh small wound packed  SKIN: No rashes or lesions  NEURO: No focal deficits    LABS:                        8.1    11.34 )-----------( 284      ( 03 Jun 2018 08:41 )             28.5           PT/INR - ( 03 Jun 2018 06:37 )   PT: 30.6 sec;   INR: 2.75 ratio           CAPILLARY BLOOD GLUCOSE                RADIOLOGY & ADDITIONAL TESTS:    Imaging Personally Reviewed:  [x] YES  [ ] NO    Consultant(s) Notes Reviewed:  [x] YES  [ ] NO    MEDICATIONS  (STANDING):  ascorbic acid 500 milliGRAM(s) Oral daily  aspirin enteric coated 81 milliGRAM(s) Oral daily  buDESOnide 160 MICROgram(s)/formoterol 4.5 MICROgram(s) Inhaler 2 Puff(s) Inhalation two times a day  clotrimazole/betamethasone Cream 1 Application(s) Topical two times a day  Dakins Solution - 1/4 Strength 1 Application(s) Topical two times a day  docusate sodium 100 milliGRAM(s) Oral three times a day  epoetin jenni Injectable 38874 Unit(s) SubCutaneous every 7 days  ferrous    sulfate 325 milliGRAM(s) Oral daily  folic acid 1 milliGRAM(s) Oral daily  gabapentin 200 milliGRAM(s) Oral three times a day  melatonin 3 milliGRAM(s) Oral at bedtime  minocycline 100 milliGRAM(s) Oral two times a day  multivitamin 1 Tablet(s) Oral daily  mupirocin 2% Ointment 1 Application(s) Topical two times a day  nystatin    Suspension 556564 Unit(s) Oral every 6 hours  pantoprazole    Tablet 40 milliGRAM(s) Oral before breakfast  polyethylene glycol 3350 17 Gram(s) Oral daily  povidone iodine 10% Solution 1 Application(s) Topical every 12 hours  tiotropium 18 MICROgram(s) Capsule 1 Capsule(s) Inhalation daily  zinc oxide 20% Ointment 1 Application(s) Topical daily    MEDICATIONS  (PRN):  acetaminophen   Tablet 650 milliGRAM(s) Oral every 6 hours PRN For Temp greater than 38 C (100.4 F)  acetaminophen   Tablet. 650 milliGRAM(s) Oral every 6 hours PRN Mild Pain (1 - 3)  bisacodyl Suppository 10 milliGRAM(s) Rectal daily PRN Constipation  HYDROmorphone   Tablet 4 milliGRAM(s) Oral every 4 hours PRN Severe Pain (7 - 10)  HYDROmorphone   Tablet 2 milliGRAM(s) Oral every 4 hours PRN Moderate Pain (4 - 6)  lidocaine 2% Gel 1 Application(s) Topical daily PRN dressing change  lidocaine 2% Viscous 10 milliLiter(s) Swish and Spit every 6 hours PRN Mouth Sores  senna 2 Tablet(s) Oral at bedtime PRN Constipation      Care Discussed with Consultants/Other Providers [x] YES  [ ] NO    HEALTH ISSUES - PROBLEM Dx:  Skin ulcer, unspecified ulcer stage: Skin ulcer, unspecified ulcer stage  Pain: Pain  Adjustment disorder, unspecified type  Delirium due to another medical condition  Pneumonia: Pneumonia  Aortic stenosis, severe: Aortic stenosis, severe  MYAH (obstructive sleep apnea): MYAH (obstructive sleep apnea)  Obesity: Obesity  Asthma: Asthma  SOB (shortness of breath): SOB (shortness of breath)  Chronic deep vein thrombosis (DVT) of lower extremity, unspecified laterality, unspecified vein: Chronic deep vein thrombosis (DVT) of lower extremity, unspecified laterality, unspecified vein  Arthralgia of knee, unspecified laterality: Arthralgia of knee, unspecified laterality  Hyperlipidemia, unspecified hyperlipidemia type: Hyperlipidemia, unspecified hyperlipidemia type  Asthma, unspecified asthma severity, unspecified whether complicated, unspecified whether persistent: Asthma, unspecified asthma severity, unspecified whether complicated, unspecified whether persistent  Coronary artery disease involving native coronary artery of native heart without angina pectoris: Coronary artery disease involving native coronary artery of native heart without angina pectoris  Gastroesophageal reflux disease, esophagitis presence not specified: Gastroesophageal reflux disease, esophagitis presence not specified  Fever, unspecified fever cause: Fever, unspecified fever cause

## 2018-06-04 NOTE — PROGRESS NOTE ADULT - SUBJECTIVE AND OBJECTIVE BOX
Pt is a 77 year old female who was admitted several weeks ago with aspiration pneumonia and fever with right knee pain. She also had proximal thigh swelling and there was a hematoma of the leg as well. She has a history of PMR on steroids. She also has a history of tavr, AICD andf remote bilateral TKR and was found to have strep bacteremia and a ERIKA was negative and she also had a eschar of the right knee as well. She was placed on iv antibiotics and I understand that she will be completing the antibiotics, daptomycin on this date. She told me that she had anemia in the distant past and was placed on antibiotics. She is being seen by the plastics service as well. A ct of th femur was not remarkable for infection and a ct of the chest done several weeks ago showed bibasilar PNA. She was also noted to have a left renal lesion that is unchanged now since 12/2016.     The consult was requested to see if there is anything we can do for her anemia. The counts on the day of this admission was white cell count of 9.71 hemoglobin 8.4 hematocrit 26.4 MCV 86 and MCHC 31.8 and platelet count of 749908 the diff count was 74 polys 15 lymphs and 7 monos the bun was 9 and the creatinine was 0.6     5/4 the pt is stable and the iron studies are still pending at this time. The hemoglobin was 8.4. 5/7 the pt was seen and the notes over charu last few days. The pt will in the am have ferritin and iron studies sent off. 5/8 the ferritin came back as over 300 and therefore she is not iron deficient and the anemia is consistent with chronic disease. The use of epogen might be considered here if hemoglobin drops and can be implemented to reduce blood transfusions. 5/9 pt clearly looks better and repeat hemoglobin was stable at 8.1  PE weak appearing with female and needed help to do normal daily activities large eschar as described on the lower leg and at the time of my arrival, the wound vac was in place and she was being cared for by the staff.  5/8 the ferritin came back at over 300 and therefore she is not iron deficient and epo can be considered if the hemoglobin drops and is in need of blood support. 5/10 pt being evaluated for rehab and consider epo of hemoglobin drops below the 8 range. 5/11 notes reviewed and will order cbc on the weekend and decide on epo.  pmh as above in the body of the report  5/14 the hemoglobin remains at 8.1 and debridement and wound vac and abdominal wound and thigh wounds were being cared for. 5/15 stable clinically and looks better the hemoglobin without epo is up to 8.5 5/16 notes and labs reviewed and no change from heme point of view.     5/17 hemoglobin down to 8.1 and stable clinically. 5/19 called by Dr Marrero as the pt had a hemoglobin of 7.3 and procrit will be started at [place] dose [place] 51164 units a week in an attempt to reduce transfusion requirements. 5/21 hemoglobin 7.5 and stable on procrit now notes reviewed. 5/30 stable and being cared for by the staff, the hemoglobin was noted to be 9.1. 6/2 pt stable and notes reviewed hemoglobin stable at 8.9. 6/4 pt awaiting discharge to [facility] and continues on epo as needed the hemoglobin was noted to be 8.1.  zinc oxide 20% Ointment 1 Application(s) Topical daily   PE being cared for by the staff and appeared to be stable.                           8.9    12.2  )-----------( 277      ( 01 Jun 2018 07:30 )             28.5   MEDICATIONS  (STANDING):  ascorbic acid 500 milliGRAM(s) Oral daily  aspirin enteric coated 81 milliGRAM(s) Oral daily  buDESOnide 160 MICROgram(s)/formoterol 4.5 MICROgram(s) Inhaler 2 Puff(s) Inhalation two times a day  clotrimazole/betamethasone Cream 1 Application(s) Topical two times a day  Dakins Solution - 1/4 Strength 1 Application(s) Topical two times a day  docusate sodium 100 milliGRAM(s) Oral three times a day  epoetin jenni Injectable 75604 Unit(s) SubCutaneous every 7 days  ferrous    sulfate 325 milliGRAM(s) Oral daily  folic acid 1 milliGRAM(s) Oral daily  gabapentin 200 milliGRAM(s) Oral three times a day  melatonin 3 milliGRAM(s) Oral at bedtime  minocycline 100 milliGRAM(s) Oral two times a day  multivitamin 1 Tablet(s) Oral daily  mupirocin 2% Ointment 1 Application(s) Topical two times a day  nystatin    Suspension 598692 Unit(s) Oral every 6 hours  pantoprazole    Tablet 40 milliGRAM(s) Oral before breakfast  polyethylene glycol 3350 17 Gram(s) Oral daily  povidone iodine 10% Solution 1 Application(s) Topical every 12 hours  tiotropium 18 MICROgram(s) Capsule 1 Capsule(s) Inhalation daily  zinc oxide 20% Ointment 1 Application(s) Topical daily

## 2018-06-04 NOTE — PROVIDER CONTACT NOTE (OTHER) - ASSESSMENT
Pt A&Ox4, able to make needs known. States pain will get out of control if she does not have pain medication now. Grimacing and moaning in pain.
Pt alert and oriented x 4
Pt awake and in no acute distress.
pt alert and oriented times three
A&O x1-2. received IVP Dilaudid 0.5mg at 1000hrs this morning. pt normally taking PO Dilaudid 2mg or 4mg. baseline is A&O x4
C/O chest pain, briefly. pt had lunch, belched
NP Zeta aware, to notify psych of findings. Pt with aide at bedside at this time and safety is maintained
Patient aox3
Patient aox4
Patient aox4, no complaints of pain, vitals stable
Patient aox4. Navarrete placed 4/9
Pt aware that skin is compromised and she must be turned and positioned to maintain integrity. Pt also refusing to get oob with violet
informed pt that she will be going for xray of the rt knee - pt stated "can it be done at the bedside?" Rn to ask MD
pt A0X4, v/s wnl no SOB or distress noted
pt a and ox4, pt denies chest pain, denies headache, denies SOB, VSS, pt voids , pt had BM this morning 5/13/18 , no distress noted, pt  at bedside
redness and moist by abd panus, under breasts and groin

## 2018-06-04 NOTE — PROVIDER CONTACT NOTE (OTHER) - SITUATION
noted pt with lesion in her mid lower abdomen, erythema and induration about 12x8
Patient complaining of chest tightness and pulling feeling
Patient febrile with oral temp 101.1
Patient stated her "lund broke", upon assessment lund leaking, patient saturated in urine
Patients urine output via lund catheter was only 125cc from 2pm to 11pm
Pt a&ox3 dx sepsis/MRSA 4/8, multiple skin wounds, refusing to be turned and positioned
Pt a&ox3, dx MRSA/sepsis 4/8, multiple wounds co-morbidities, stating she "wants to die, is praying to God for him to take her, doesn't want to live anymore"
Pt called for MBS.  team of : Transport X2, PT, RN and Private  at bedside to encourage pt to go for MBS and transfer to stretcher chair ([employer]).   Pt refusing procedure.
See above
c/o chest pain
pt has picc and I asked if they would write for orders to use and or check picc line.
pt has xray of the rt knee ordered urgent for tonight
pt refused AM medications and dressing changes.
pt refused to have dressing changed.pt alert times four.I explained to pt risks of not having dressing change done.pt refused .
pt refused to let me do dressing change.I tried numerous times to get pt to comply but she still refused.I explained to pt risks  of not doing dressing.pt still refused.pt alert times four.
pt refused to take all scheduled morning medications, pt refused all dressing changes this morning
pt very confused. Screaming out things that don't make sense. Stating that she is being held hostage and requesting to go back to the hospital. Son and aide agree pt is confused
pt with MAD, interdry used for 1 week worsening
Pt complaining of severe right leg pain, dilaudid 4mg PO not due until 21:10.

## 2018-06-04 NOTE — PROVIDER CONTACT NOTE (OTHER) - DATE AND TIME:
04-Jun-2018 20:15
05-May-2018 16:44
06-May-2018 11:11
07-May-2018 14:30
08-May-2018 21:20
09-Apr-2018 22:00
09-May-2018 23:20
10-Apr-2018 23:30
11-Apr-2018 14:29
11-May-2018 06:45
13-Apr-2018 05:37
13-May-2018 06:25
16-Apr-2018 09:50
16-May-2018 22:20
19-Apr-2018 20:05
21-Apr-2018 00:18
21-Apr-2018 04:45
21-Apr-2018 13:00
18-May-2018 20:35

## 2018-06-04 NOTE — PROVIDER CONTACT NOTE (OTHER) - REASON
Patient complaining of chest tightness
Patient febrile
Patient having low urine output
Patient lund leaking
Pt refusing turn and positioning
Pt tearful stating she wants to die
Temp 101.4
c/o chest pain
confusion
noted pt with lesion in her mid lower abdomen, erythema and induration about 12x8
pt has picc line
pt has temp 100.4
pt refused dressing change.
pt refused some AM meds
pt refused to take all scheduled morning medications, pt refused all dressing changes this morning
pt refusing MBS
pt refusing dressing changes
pt requesting xray to be done at bedside
pt with MAD, interdry used for 1 week worsening
Pt complaining of severe pain, pain medication not due at this time

## 2018-06-05 LAB
APPEARANCE UR: ABNORMAL
BILIRUB UR-MCNC: NEGATIVE — SIGNIFICANT CHANGE UP
COLOR SPEC: YELLOW — SIGNIFICANT CHANGE UP
DIFF PNL FLD: ABNORMAL
GLUCOSE UR QL: NEGATIVE MG/DL — SIGNIFICANT CHANGE UP
INR BLD: 2.51 RATIO — HIGH (ref 0.88–1.16)
KETONES UR-MCNC: NEGATIVE — SIGNIFICANT CHANGE UP
LEUKOCYTE ESTERASE UR-ACNC: ABNORMAL
NITRITE UR-MCNC: NEGATIVE — SIGNIFICANT CHANGE UP
PH UR: 6.5 — SIGNIFICANT CHANGE UP (ref 5–8)
PROT UR-MCNC: 30 MG/DL
PROTHROM AB SERPL-ACNC: 27.9 SEC — HIGH (ref 9.8–12.7)
SP GR SPEC: 1.02 — SIGNIFICANT CHANGE UP (ref 1.01–1.02)
UROBILINOGEN FLD QL: 1 MG/DL — SIGNIFICANT CHANGE UP

## 2018-06-05 PROCEDURE — 97598 DBRDMT OPN WND ADDL 20CM/<: CPT

## 2018-06-05 PROCEDURE — 97597 DBRDMT OPN WND 1ST 20 CM/<: CPT

## 2018-06-05 PROCEDURE — 99232 SBSQ HOSP IP/OBS MODERATE 35: CPT | Mod: 25

## 2018-06-05 RX ORDER — WARFARIN SODIUM 2.5 MG/1
0.5 TABLET ORAL ONCE
Qty: 0 | Refills: 0 | Status: COMPLETED | OUTPATIENT
Start: 2018-06-05 | End: 2018-06-05

## 2018-06-05 RX ADMIN — Medication 1 APPLICATION(S): at 18:01

## 2018-06-05 RX ADMIN — CLOTRIMAZOLE AND BETAMETHASONE DIPROPIONATE 1 APPLICATION(S): 10; .5 CREAM TOPICAL at 17:59

## 2018-06-05 RX ADMIN — WARFARIN SODIUM 0.5 MILLIGRAM(S): 2.5 TABLET ORAL at 21:33

## 2018-06-05 RX ADMIN — HYDROMORPHONE HYDROCHLORIDE 2 MILLIGRAM(S): 2 INJECTION INTRAMUSCULAR; INTRAVENOUS; SUBCUTANEOUS at 19:31

## 2018-06-05 RX ADMIN — MINOCYCLINE HYDROCHLORIDE 100 MILLIGRAM(S): 45 TABLET, EXTENDED RELEASE ORAL at 06:35

## 2018-06-05 RX ADMIN — CLOTRIMAZOLE AND BETAMETHASONE DIPROPIONATE 1 APPLICATION(S): 10; .5 CREAM TOPICAL at 06:35

## 2018-06-05 RX ADMIN — Medication 500000 UNIT(S): at 18:00

## 2018-06-05 RX ADMIN — Medication 81 MILLIGRAM(S): at 15:30

## 2018-06-05 RX ADMIN — TIOTROPIUM BROMIDE 1 CAPSULE(S): 18 CAPSULE ORAL; RESPIRATORY (INHALATION) at 12:44

## 2018-06-05 RX ADMIN — MINOCYCLINE HYDROCHLORIDE 100 MILLIGRAM(S): 45 TABLET, EXTENDED RELEASE ORAL at 17:59

## 2018-06-05 RX ADMIN — GABAPENTIN 200 MILLIGRAM(S): 400 CAPSULE ORAL at 15:31

## 2018-06-05 RX ADMIN — Medication 3 MILLIGRAM(S): at 21:33

## 2018-06-05 RX ADMIN — Medication 1 MILLIGRAM(S): at 12:43

## 2018-06-05 RX ADMIN — Medication 100 MILLIGRAM(S): at 21:33

## 2018-06-05 RX ADMIN — HYDROMORPHONE HYDROCHLORIDE 2 MILLIGRAM(S): 2 INJECTION INTRAMUSCULAR; INTRAVENOUS; SUBCUTANEOUS at 13:45

## 2018-06-05 RX ADMIN — Medication 500000 UNIT(S): at 12:43

## 2018-06-05 RX ADMIN — Medication 500 MILLIGRAM(S): at 15:29

## 2018-06-05 RX ADMIN — Medication 1 APPLICATION(S): at 06:34

## 2018-06-05 RX ADMIN — Medication 325 MILLIGRAM(S): at 15:30

## 2018-06-05 RX ADMIN — Medication 100 MILLIGRAM(S): at 06:35

## 2018-06-05 RX ADMIN — Medication 500000 UNIT(S): at 06:35

## 2018-06-05 RX ADMIN — Medication 1 TABLET(S): at 15:29

## 2018-06-05 RX ADMIN — ZINC OXIDE 1 APPLICATION(S): 200 OINTMENT TOPICAL at 15:31

## 2018-06-05 RX ADMIN — Medication 650 MILLIGRAM(S): at 18:21

## 2018-06-05 RX ADMIN — HYDROMORPHONE HYDROCHLORIDE 2 MILLIGRAM(S): 2 INJECTION INTRAMUSCULAR; INTRAVENOUS; SUBCUTANEOUS at 20:01

## 2018-06-05 RX ADMIN — GABAPENTIN 200 MILLIGRAM(S): 400 CAPSULE ORAL at 21:32

## 2018-06-05 RX ADMIN — GABAPENTIN 200 MILLIGRAM(S): 400 CAPSULE ORAL at 06:35

## 2018-06-05 RX ADMIN — BUDESONIDE AND FORMOTEROL FUMARATE DIHYDRATE 2 PUFF(S): 160; 4.5 AEROSOL RESPIRATORY (INHALATION) at 17:59

## 2018-06-05 RX ADMIN — HYDROMORPHONE HYDROCHLORIDE 2 MILLIGRAM(S): 2 INJECTION INTRAMUSCULAR; INTRAVENOUS; SUBCUTANEOUS at 07:31

## 2018-06-05 RX ADMIN — MUPIROCIN 1 APPLICATION(S): 20 OINTMENT TOPICAL at 18:00

## 2018-06-05 RX ADMIN — Medication 1 APPLICATION(S): at 18:02

## 2018-06-05 RX ADMIN — PANTOPRAZOLE SODIUM 40 MILLIGRAM(S): 20 TABLET, DELAYED RELEASE ORAL at 06:35

## 2018-06-05 RX ADMIN — BUDESONIDE AND FORMOTEROL FUMARATE DIHYDRATE 2 PUFF(S): 160; 4.5 AEROSOL RESPIRATORY (INHALATION) at 06:35

## 2018-06-05 RX ADMIN — HYDROMORPHONE HYDROCHLORIDE 2 MILLIGRAM(S): 2 INJECTION INTRAMUSCULAR; INTRAVENOUS; SUBCUTANEOUS at 06:34

## 2018-06-05 RX ADMIN — HYDROMORPHONE HYDROCHLORIDE 2 MILLIGRAM(S): 2 INJECTION INTRAMUSCULAR; INTRAVENOUS; SUBCUTANEOUS at 12:30

## 2018-06-05 RX ADMIN — MUPIROCIN 1 APPLICATION(S): 20 OINTMENT TOPICAL at 06:34

## 2018-06-05 NOTE — PROGRESS NOTE ADULT - SUBJECTIVE AND OBJECTIVE BOX
CC: f/u for infected right knee    Patient reports no specific complaints    REVIEW OF SYSTEMS:  All other review of systems negative (Comprehensive ROS)    Antimicrobials Day #  :chronic  minocycline 100 milliGRAM(s) Oral two times a day  nystatin    Suspension 316877 Unit(s) Oral every 6 hours    Other Medications Reviewed    T(F): 98 (18 @ 10:38), Max: 100.3 (18 @ 21:10)  HR: 80 (18 @ 10:38)  BP: 118/77 (18 @ 10:38)  RR: 18 (18 @ 10:38)  SpO2: 94% (18 @ 10:38)  Wt(kg): --    PHYSICAL EXAM:  General: alert, no acute distress  Eyes:  anicteric, no conjunctival injection, no discharge  Oropharynx: no lesions or injection 	  Neck: supple, without adenopathy  Lungs: clear to auscultation, diminished at bases  Heart: regular rate and rhythm; no murmur, rubs or gallops  Abdomen: soft, nondistended, nontender, without mass or organomegaly,wound vac in place x 2  Skin: rt knee with large scab, no purulence  Extremities: no clubbing, cyanosis, or edema  Neurologic: alert, oriented, moves all extremities    LAB RESULTS:            Urinalysis Basic - ( 2018 08:30 )    Color: Yellow / Appearance: Slightly Turbid / S.020 / pH: x  Gluc: x / Ketone: Negative  / Bili: Negative / Urobili: 1.0 mg/dL   Blood: x / Protein: 30 mg/dL / Nitrite: Negative   Leuk Esterase: Large / RBC: 4 /HPF / WBC 78 /HPF   Sq Epi: x / Non Sq Epi: 11 /HPF / Bacteria: Few      MICROBIOLOGY:  RECENT CULTURES:      RADIOLOGY REVIEWED:

## 2018-06-05 NOTE — PROGRESS NOTE ADULT - ASSESSMENT
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     # septic Rt TKR - abx spacer exchanged and plastics complex wound closure  # PMR (off steroids to allow for wound healing)  # right knee eschar, right thigh necrosis and abd wound, s/p multiple debridements  # Anemia chronic disease    Plan:  wound care fu and wound vac per plastics  cleared for DC by plastics  left thigh pathology soft tissue necrosis with abscess formation  multiple ulcerated wounds: evaluated by derm, punch bx Cx-ngtd and path negative for pyoderma gangrenosum  remains afebrile, cont suppressive minocycline  cont coumadin and asa  Pain control, limit pain medication if signs of drowsiness  no active signs of UTI, no urinary symptoms, would not start abx, cont to trend fever curve  PT OOB no knee motion, immobilizer if out of bed, has bariatric chair  bowel regimen    DC planning to rehab  dw Mr Rdz [phone] that patient is medically cleared for discharge. Mr Rdz states he only wants [facility] and will be hiring private aids for wound care, He has been speaking to [facility] and he requests a specific room at [facility] on the second floor, He believes that [facility] is what is best for his wife. Advised that  that currently [facility] has not accepted patient. He states he will get them to accept her by Friday.  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     # septic Rt TKR - abx spacer exchanged and plastics complex wound closure  # PMR (off steroids to allow for wound healing)  # right knee eschar, right thigh necrosis and abd wound, s/p multiple debridements  # Anemia chronic disease    Plan:  wound care fu and wound vac per plastics  cleared for DC by plastics  left thigh pathology soft tissue necrosis with abscess formation  multiple ulcerated wounds: evaluated by derm, punch bx Cx-ngtd and path negative for pyoderma gangrenosum  remains afebrile, cont suppressive minocycline  cont coumadin and asa  Pain control, limit pain medication if signs of drowsiness  no active signs of UTI, no urinary symptoms, would not start abx, cont to trend fever curve  PT OOB no knee motion, immobilizer if out of bed, has bariatric chair  bowel regimen    DC planning to rehab  dw Mr Rdz [phone] that patient is medically cleared for discharge. Mr Rdz states he only wants [facility] and will be hiring private aids for wound care, He has been speaking to [facility] and he requests a specific room at [facility] on the second floor, He believes that [facility] is what is best for his wife. Advised that  that currently [facility] has not accepted patient. He states he will get them to accept her by Friday.  dw NP  dw patient    Dr. Murphy will cover starting 6/6. Please call [phone] with questions. 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     # septic Rt TKR - abx spacer exchanged and plastics complex wound closure  # PMR (off steroids to allow for wound healing)  # right knee eschar, right thigh necrosis and abd wound, s/p multiple debridements  # Anemia chronic disease    Plan:  wound care fu and wound vac per plastics  cleared for DC by plastics  left thigh pathology soft tissue necrosis with abscess formation  multiple ulcerated wounds: evaluated by derm, punch bx Cx-ngtd and path negative for pyoderma gangrenosum  remains afebrile, cont suppressive minocycline  cont coumadin and asa  Pain control, limit pain medication if signs of drowsiness  no active signs of UTI, no urinary symptoms, would not start abx, cont to trend fever curve  PT OOB no knee motion, immobilizer if out of bed, has bariatric chair  bowel regimen    DC planning to rehab  dw Mr Rdz [phone] that patient is medically cleared for discharge. Mr Rdz states he only wants [facility] and will be hiring private aids for wound care, He has been speaking to [facility] and he requests a specific room at [facility] on the second floor, He believes that [facility] is what is best for his wife. Advised that  that currently [facility] has not accepted patient. He states he will get them to accept her by Friday.  yasmin NP  dw patient

## 2018-06-05 NOTE — PROGRESS NOTE ADULT - SUBJECTIVE AND OBJECTIVE BOX
Patient is a 77y old  Female who presents with a chief complaint of fever (2018 14:25)      SUBJECTIVE / OVERNIGHT EVENTS:    Patient seen and examined. lethargic this morning, last dose pain medication 6 am. sitting in chair. denies cp sob.      Vital Signs Last 24 Hrs  T(C): 36.7 (2018 10:38), Max: 37.9 (2018 21:10)  T(F): 98 (2018 10:38), Max: 100.3 (2018 21:10)  HR: 80 (2018 10:38) (71 - 86)  BP: 118/77 (2018 10:38) (118/77 - 130/60)  BP(mean): --  RR: 18 (2018 10:38) (18 - 20)  SpO2: 94% (2018 10:38) (93% - 97%)  I&O's Summary    2018 07:01  -  2018 07:00  --------------------------------------------------------  IN: 1080 mL / OUT: 800 mL / NET: 280 mL        PE:  GENERAL: NAD, AAOx3, morbidly obese  HEAD:  Atraumatic, Normocephalic  EYES: EOMI, PERRLA, conjunctiva and sclera clear  NECK: Supple, No JVD  CHEST/LUNG: CTABL, No wheeze  HEART: Regular rate and rhythm; no murmur  ABDOMEN: Soft, Nontender, Nondistended; Bowel sounds present, lower abd wound wound vac  EXTREMITIES:  right thigh wound c wound vac, right knee eschar, left lateral thigh small wound packed  SKIN: No rashes or lesions  NEURO: No focal deficits    LABS:          PT/INR - ( 2018 07:10 )   PT: 27.9 sec;   INR: 2.51 ratio           CAPILLARY BLOOD GLUCOSE            Urinalysis Basic - ( 2018 08:30 )    Color: Yellow / Appearance: Slightly Turbid / S.020 / pH: x  Gluc: x / Ketone: Negative  / Bili: Negative / Urobili: 1.0 mg/dL   Blood: x / Protein: 30 mg/dL / Nitrite: Negative   Leuk Esterase: Large / RBC: 4 /HPF / WBC 78 /HPF   Sq Epi: x / Non Sq Epi: 11 /HPF / Bacteria: Few        RADIOLOGY & ADDITIONAL TESTS:    Imaging Personally Reviewed:  [x] YES  [ ] NO    Consultant(s) Notes Reviewed:  [x] YES  [ ] NO    MEDICATIONS  (STANDING):  ascorbic acid 500 milliGRAM(s) Oral daily  aspirin enteric coated 81 milliGRAM(s) Oral daily  buDESOnide 160 MICROgram(s)/formoterol 4.5 MICROgram(s) Inhaler 2 Puff(s) Inhalation two times a day  clotrimazole/betamethasone Cream 1 Application(s) Topical two times a day  Dakins Solution - 1/4 Strength 1 Application(s) Topical two times a day  docusate sodium 100 milliGRAM(s) Oral three times a day  epoetin jenni Injectable 28717 Unit(s) SubCutaneous every 7 days  ferrous    sulfate 325 milliGRAM(s) Oral daily  folic acid 1 milliGRAM(s) Oral daily  gabapentin 200 milliGRAM(s) Oral three times a day  melatonin 3 milliGRAM(s) Oral at bedtime  minocycline 100 milliGRAM(s) Oral two times a day  multivitamin 1 Tablet(s) Oral daily  mupirocin 2% Ointment 1 Application(s) Topical two times a day  nystatin    Suspension 245155 Unit(s) Oral every 6 hours  pantoprazole    Tablet 40 milliGRAM(s) Oral before breakfast  polyethylene glycol 3350 17 Gram(s) Oral daily  povidone iodine 10% Solution 1 Application(s) Topical every 12 hours  tiotropium 18 MICROgram(s) Capsule 1 Capsule(s) Inhalation daily  warfarin 0.5 milliGRAM(s) Oral once  zinc oxide 20% Ointment 1 Application(s) Topical daily    MEDICATIONS  (PRN):  acetaminophen   Tablet 650 milliGRAM(s) Oral every 6 hours PRN For Temp greater than 38 C (100.4 F)  acetaminophen   Tablet. 650 milliGRAM(s) Oral every 6 hours PRN Mild Pain (1 - 3)  bisacodyl Suppository 10 milliGRAM(s) Rectal daily PRN Constipation  HYDROmorphone   Tablet 4 milliGRAM(s) Oral every 4 hours PRN Severe Pain (7 - 10)  HYDROmorphone   Tablet 2 milliGRAM(s) Oral every 4 hours PRN Moderate Pain (4 - 6)  lidocaine 2% Gel 1 Application(s) Topical daily PRN dressing change  lidocaine 2% Viscous 10 milliLiter(s) Swish and Spit every 6 hours PRN Mouth Sores  senna 2 Tablet(s) Oral at bedtime PRN Constipation      Care Discussed with Consultants/Other Providers [x] YES  [ ] NO    HEALTH ISSUES - PROBLEM Dx:  Skin ulcer, unspecified ulcer stage: Skin ulcer, unspecified ulcer stage  Pain: Pain  Adjustment disorder, unspecified type  Delirium due to another medical condition  Pneumonia: Pneumonia  Aortic stenosis, severe: Aortic stenosis, severe  MYAH (obstructive sleep apnea): MYAH (obstructive sleep apnea)  Obesity: Obesity  Asthma: Asthma  SOB (shortness of breath): SOB (shortness of breath)  Chronic deep vein thrombosis (DVT) of lower extremity, unspecified laterality, unspecified vein: Chronic deep vein thrombosis (DVT) of lower extremity, unspecified laterality, unspecified vein  Arthralgia of knee, unspecified laterality: Arthralgia of knee, unspecified laterality  Hyperlipidemia, unspecified hyperlipidemia type: Hyperlipidemia, unspecified hyperlipidemia type  Asthma, unspecified asthma severity, unspecified whether complicated, unspecified whether persistent: Asthma, unspecified asthma severity, unspecified whether complicated, unspecified whether persistent  Coronary artery disease involving native coronary artery of native heart without angina pectoris: Coronary artery disease involving native coronary artery of native heart without angina pectoris  Gastroesophageal reflux disease, esophagitis presence not specified: Gastroesophageal reflux disease, esophagitis presence not specified  Fever, unspecified fever cause: Fever, unspecified fever cause

## 2018-06-05 NOTE — PROGRESS NOTE ADULT - ASSESSMENT
77 year old female s/p rudy from right knee and spacer for strept infection then returned with nonhealing wound and had spacer change, fusion ruthann, plastic closure and grew mrsa.   Wound developed necrotic skin, then pneumonia, and hematoma over the right thigh with necrosis of skin too.   Finished full course of iv abx, on suppressive minocycline for the knee. Pneumonia treated.  Debrided thigh and abdomen wound with VAC.  Dermatology performed 4mm punch bx performed of R thigh- cx grew enterobacter and morganella, suggestive of colonizing roopa in a patient with prolonged hospital stay,   Path as reviewed above- inflammatory infiltrate, no features of pyoderma in sample.Appreciate dermatology f/u, no support for PG at this point.  Afebrile, stable WBC, mild leukocytosis seems nonspecific at this time.It has been moderating.  She has been stable the past few weeks.  Plan:   Continue suppressive minocycline, no signs of drug toxicity   Local wound care likely to require most time/intervention, and remain most debilitating  R knee eschar partially removed, no gross infection is apparent, wound appears stable  No ID objection to discharge planning  [place] guarded, appreciate plans of Dr Marrero and close medical follwup.

## 2018-06-05 NOTE — PROGRESS NOTE ADULT - SUBJECTIVE AND OBJECTIVE BOX
pain controlled, elevated temp 1x overnight to 100.3F.     abdominal wound - vac in place  RLE  thigh vac in place  knee wound with stable fibrinous base, dressed by prs/wound care  5/5 ta/ehl/gcs  silt l4-s1  2+ dp pulse

## 2018-06-05 NOTE — PROGRESS NOTE ADULT - ASSESSMENT
no plan for orthopedic surgical intervention per family and patient preference  local wound care to knee and vac therapy to abd/thigh per plastics and wound care teams  PO abx per ID team  encourage patient to spend majority of bed oob in bedside chair as able  PT to help mobilize, she must remain 50% wb on the RLE and NO KNEE MOTION allowed, knee immobilizer if oob  coumadin, inr goal 2-3  continue to optimize nutrition  continue to involve psych	  dispo planning

## 2018-06-05 NOTE — PROGRESS NOTE ADULT - SUBJECTIVE AND OBJECTIVE BOX
SUBJECTIVE: Pt seen, chart reviewed.    requested to reeval by plastics & medicine prior to d/c to rehab  Pain rx changed but pt w/continued pain  Pt often feels sad about situation  HHA at bedside- all questions asked and answered to pt's satisfaction  (+)pain  (+)drianage  (+) odor from thigh wounds  no redness, warmth, f/c/s noted  pt continues to develop new necrotic wounds- ? 2/2coumadin or steroid use  derm consult appreciated    ROS skin & MSK see HPI  All other systems negative        aspirin enteric coated 81 milliGRAM(s) Oral daily  minocycline 100 milliGRAM(s) Oral two times a day  nystatin    Suspension 190245 Unit(s) Oral every 6 hours  warfarin 0.5 milliGRAM(s) Oral once      Physical Exam:  Vital Signs Last 24 Hrs  T(C): 36.7 (05 Jun 2018 10:38), Max: 37.9 (04 Jun 2018 21:10)  T(F): 98 (05 Jun 2018 10:38), Max: 100.3 (04 Jun 2018 21:10)  HR: 80 (05 Jun 2018 10:38) (75 - 86)  BP: 118/77 (05 Jun 2018 10:38) (118/77 - 130/60)  BP(mean): --  RR: 18 (05 Jun 2018 10:38) (18 - 20)  SpO2: 94% (05 Jun 2018 10:38) (93% - 96%)      General Appearance:  NAD, A&Ox3, MO  Disheveled, distressed about overall medical condition  HHA Stan at bedside- she's able to keep pt calm throughout eval  (+)premedication by RN prior to dressing changes  ENvella bed    Skin:  frail,  ecchymosis w/o hematoma  Abdominal wall  & Rt anterior thigh wounds- VAC w/ wound PT & plastics  Rt knee wound managed by plastics    Lt lateral thigh 2cm x 2.5cm x 3.5cm wound  w/ fatty necrosis and granular tissue   (+)serosanguinous liquefaction necrosis drainage  (+)tender (+)faint malodor  No erythema, increased warmth, induration, fluctuance    Lt lateral posterior thigh (inferior)  Partial thickness moist granular wound  (+)serosanguinous drainage  No erythema, tenderness, odor, increased warmth, induration, fluctuance    Lt  Upper medial inner thigh w/ 2cm x 1.5cm x 0.5cm   moist &granular w/ fibrinous exudate  (+)serosanguinous drainage  (+)tender (+)faint malodor  No erythema, increased warmth, induration, fluctuance    Rt posterolateral  upper thigh w/ lifting soft eschar very loosely attached to wound edge  5cm x 7cm x 5cm    evacuation of fatty necrosis tissue and hematoma (+)foul odor w/ gentle manual exploration of wound  revealing moist & granular tissue  no active bleeding  wound irrigated clear w/ NS irrigation  thin rim of soft eschar left intact  (+)tender  (+) liquefaction necrosis drainage  No erythema, increased warmth, induration, fluctuance    BLE thighs w/ small <1cm firmly attached dry eschars w/o signs of infection    LABS:    PT/INR - ( 05 Jun 2018 07:10 )   PT: 27.9 sec;   INR: 2.51 ratio      Complete Blood Count in AM (06.03.18 @ 08:41)    WBC Count: 11.34 K/uL    RBC Count: 3.13 M/uL    Hemoglobin: 8.1 g/dL    Hematocrit: 28.5 %    Mean Cell Volume: 91.1 fl    Mean Cell Hemoglobin: 25.9 pg    Mean Cell Hemoglobin Conc: 28.4 gm/dL    Red Cell Distrib Width: 18.6 %    Platelet Count - Automated: 284 K/uL      RADIOLOGY & ADDITIONAL STUDIES:    CULTURES:  Culture - Acid Fast - Tissue w/Smear (05.23.18 @ 18:05)    Specimen Source: .Tissue Other, right leg    Acid Fast Bacilli Smear:   No acid fast bacilli seen by fluorochrome stain    Culture Results:   No growth at 1 week.    Culture - Fungal, Tissue (05.23.18 @ 18:05)    Specimen Source: .Tissue right here    Culture Results:   No fungus isolated at 1 week. No additional interim reports will be  issued unless there is a change in culture status.    Culture - Tissue with Gram Stain (05.23.18 @ 18:05)    -  Tetra/Doxy: R >8    -  Trimethoprim/Sulfamethoxazole: S <=0.5/9.5    -  Trimethoprim/Sulfamethoxazole: S <=0.5/9.5    -  Imipenem: S <=1    -  Imipenem: I 2    -  Levofloxacin: S <=1    -  Levofloxacin: S <=1    -  Meropenem: S <=1    -  Meropenem: S <=1    -  Cefepime: S <=2    -  Piperacillin/Tazobactam: S <=8    -  Tobramycin: S <=2    -  Cefepime: R >16    -  Piperacillin/Tazobactam: I 32    -  Tobramycin: S <=2    -  Ampicillin/Sulbactam: R >16/8    -  Ceftriaxone: R 8 Enterobacter, Citrobacter, and Serratia may develop resistance during prolonged therapy    -  Ampicillin/Sulbactam: R >16/8    -  Ceftriaxone: R 8 Enterobacter, Citrobacter, and Serratia may develop resistance during prolonged therapy    -  Vancomycin: S 4    Gram Stain:   No polymorphonuclear cells seen per low power field  Rare Gram Negative Rods per oil power field    -  Amikacin: S <=8    -  Amikacin: S <=8    -  Amoxicillin/Clavulanic Acid: R >16/8    -  Amoxicillin/Clavulanic Acid: R >16/8    -  Ampicillin: R >16 These ampicillin results predict results for amoxicillin    -  Ampicillin: R >16 These ampicillin results predict results for amoxicillin    -  Ampicillin: S <=2    -  Aztreonam: S <=4    -  Aztreonam: S <=4    -  Cefazolin: R >16    -  Cefazolin: R >16    -  Cefoxitin: R <=4    -  Cefoxitin: S 8    -  Ciprofloxacin: S <=0.5    -  Ciprofloxacin: I 2    -  Ciprofloxacin: R >2    -  Ertapenem: S <=0.5    -  Ertapenem: S <=0.5    -  Erythromycin: R >4    -  Gentamicin: S <=1    -  Gentamicin: S <=1    Specimen Source: .Tissue Other, right thigh    Culture Results:   Moderate Enterobacter cloacae/asburiae  Moderate Morganella morganii  Growth in fluid media only Enterococcus faecalis    Organism Identification: Enterobacter cloacae/absuria  Morganella morganii  Enterococcus faecalis    Organism: Enterococcus faecalis    Organism: Enterobacter cloacae/absuria    Organism: Morganella morganii    Method Type: EDWARD    Method Type: EDWARD    Method Type: EDWARD

## 2018-06-05 NOTE — PROGRESS NOTE ADULT - ASSESSMENT
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    Rt Thigh & ABdominal wall wound - VAC per Wound PT & plastics  Lt & Rt thigh wounds - pack w/ Dakins  Only infer Lt lateral thigh wound aquacel  Small eschars- cavilon  Rt Knee wound- as per plastics-  Abx per ID  Con't to monitor pt's Psych as pt is upset about her situation  con't Nutrition (as tolerated)  con't Offloading   con't Pericare  Care as per plastics/ortho/ medicine will follow w/ you  Upon discharge f/u as outpatient at [facility] [address] [facility] [phone]  D/w team & attng  Janki Cramer PA-C [id number]  I spent  25 minutes w/ this pt of which more than 50% of the time was spent counseling & coordinating care of this pt.

## 2018-06-06 VITALS
DIASTOLIC BLOOD PRESSURE: 69 MMHG | SYSTOLIC BLOOD PRESSURE: 113 MMHG | RESPIRATION RATE: 18 BRPM | OXYGEN SATURATION: 93 % | TEMPERATURE: 97 F | WEIGHT: 233.69 LBS | HEART RATE: 98 BPM

## 2018-06-06 LAB
ANION GAP SERPL CALC-SCNC: 14 MMOL/L — SIGNIFICANT CHANGE UP (ref 5–17)
BASOPHILS # BLD AUTO: 0.05 K/UL — SIGNIFICANT CHANGE UP (ref 0–0.2)
BASOPHILS NFR BLD AUTO: 0.4 % — SIGNIFICANT CHANGE UP (ref 0–2)
BUN SERPL-MCNC: 40 MG/DL — HIGH (ref 7–23)
CALCIUM SERPL-MCNC: 9.9 MG/DL — SIGNIFICANT CHANGE UP (ref 8.4–10.5)
CHLORIDE SERPL-SCNC: 98 MMOL/L — SIGNIFICANT CHANGE UP (ref 96–108)
CO2 SERPL-SCNC: 28 MMOL/L — SIGNIFICANT CHANGE UP (ref 22–31)
CREAT SERPL-MCNC: 0.83 MG/DL — SIGNIFICANT CHANGE UP (ref 0.5–1.3)
EOSINOPHIL # BLD AUTO: 0.64 K/UL — HIGH (ref 0–0.5)
EOSINOPHIL NFR BLD AUTO: 5.1 % — SIGNIFICANT CHANGE UP (ref 0–6)
GLUCOSE SERPL-MCNC: 128 MG/DL — HIGH (ref 70–99)
HCT VFR BLD CALC: 30.8 % — LOW (ref 34.5–45)
HGB BLD-MCNC: 8.8 G/DL — LOW (ref 11.5–15.5)
IMM GRANULOCYTES NFR BLD AUTO: 0.5 % — SIGNIFICANT CHANGE UP (ref 0–1.5)
INR BLD: 2.5 RATIO — HIGH (ref 0.88–1.16)
LYMPHOCYTES # BLD AUTO: 26.3 % — SIGNIFICANT CHANGE UP (ref 13–44)
LYMPHOCYTES # BLD AUTO: 3.29 K/UL — SIGNIFICANT CHANGE UP (ref 1–3.3)
MCHC RBC-ENTMCNC: 25.9 PG — LOW (ref 27–34)
MCHC RBC-ENTMCNC: 28.6 GM/DL — LOW (ref 32–36)
MCV RBC AUTO: 90.6 FL — SIGNIFICANT CHANGE UP (ref 80–100)
MONOCYTES # BLD AUTO: 0.68 K/UL — SIGNIFICANT CHANGE UP (ref 0–0.9)
MONOCYTES NFR BLD AUTO: 5.4 % — SIGNIFICANT CHANGE UP (ref 2–14)
NEUTROPHILS # BLD AUTO: 7.78 K/UL — HIGH (ref 1.8–7.4)
NEUTROPHILS NFR BLD AUTO: 62.3 % — SIGNIFICANT CHANGE UP (ref 43–77)
PLATELET # BLD AUTO: 330 K/UL — SIGNIFICANT CHANGE UP (ref 150–400)
POTASSIUM SERPL-MCNC: 4.4 MMOL/L — SIGNIFICANT CHANGE UP (ref 3.5–5.3)
POTASSIUM SERPL-SCNC: 4.4 MMOL/L — SIGNIFICANT CHANGE UP (ref 3.5–5.3)
PROTHROM AB SERPL-ACNC: 27.5 SEC — HIGH (ref 9.8–12.7)
RBC # BLD: 3.4 M/UL — LOW (ref 3.8–5.2)
RBC # FLD: 18.6 % — HIGH (ref 10.3–14.5)
SODIUM SERPL-SCNC: 140 MMOL/L — SIGNIFICANT CHANGE UP (ref 135–145)
WBC # BLD: 12.5 K/UL — HIGH (ref 3.8–10.5)
WBC # FLD AUTO: 12.5 K/UL — HIGH (ref 3.8–10.5)

## 2018-06-06 PROCEDURE — 82435 ASSAY OF BLOOD CHLORIDE: CPT

## 2018-06-06 PROCEDURE — 84295 ASSAY OF SERUM SODIUM: CPT

## 2018-06-06 PROCEDURE — 86922 COMPATIBILITY TEST ANTIGLOB: CPT

## 2018-06-06 PROCEDURE — 87040 BLOOD CULTURE FOR BACTERIA: CPT

## 2018-06-06 PROCEDURE — 86870 RBC ANTIBODY IDENTIFICATION: CPT

## 2018-06-06 PROCEDURE — 87798 DETECT AGENT NOS DNA AMP: CPT

## 2018-06-06 PROCEDURE — 86140 C-REACTIVE PROTEIN: CPT

## 2018-06-06 PROCEDURE — 85730 THROMBOPLASTIN TIME PARTIAL: CPT

## 2018-06-06 PROCEDURE — 82746 ASSAY OF FOLIC ACID SERUM: CPT

## 2018-06-06 PROCEDURE — 87633 RESP VIRUS 12-25 TARGETS: CPT

## 2018-06-06 PROCEDURE — 87102 FUNGUS ISOLATION CULTURE: CPT

## 2018-06-06 PROCEDURE — 73552 X-RAY EXAM OF FEMUR 2/>: CPT

## 2018-06-06 PROCEDURE — 97606 NEG PRS WND THER DME>50 SQCM: CPT

## 2018-06-06 PROCEDURE — 80053 COMPREHEN METABOLIC PANEL: CPT

## 2018-06-06 PROCEDURE — 97530 THERAPEUTIC ACTIVITIES: CPT

## 2018-06-06 PROCEDURE — 87486 CHLMYD PNEUM DNA AMP PROBE: CPT

## 2018-06-06 PROCEDURE — 82306 VITAMIN D 25 HYDROXY: CPT

## 2018-06-06 PROCEDURE — 85384 FIBRINOGEN ACTIVITY: CPT

## 2018-06-06 PROCEDURE — 85652 RBC SED RATE AUTOMATED: CPT

## 2018-06-06 PROCEDURE — 82803 BLOOD GASES ANY COMBINATION: CPT

## 2018-06-06 PROCEDURE — 93005 ELECTROCARDIOGRAM TRACING: CPT

## 2018-06-06 PROCEDURE — 73700 CT LOWER EXTREMITY W/O DYE: CPT

## 2018-06-06 PROCEDURE — 83010 ASSAY OF HAPTOGLOBIN QUANT: CPT

## 2018-06-06 PROCEDURE — 86902 BLOOD TYPE ANTIGEN DONOR EA: CPT

## 2018-06-06 PROCEDURE — 92611 MOTION FLUOROSCOPY/SWALLOW: CPT

## 2018-06-06 PROCEDURE — 85379 FIBRIN DEGRADATION QUANT: CPT

## 2018-06-06 PROCEDURE — 74176 CT ABD & PELVIS W/O CONTRAST: CPT

## 2018-06-06 PROCEDURE — 87206 SMEAR FLUORESCENT/ACID STAI: CPT

## 2018-06-06 PROCEDURE — 88304 TISSUE EXAM BY PATHOLOGIST: CPT

## 2018-06-06 PROCEDURE — 82550 ASSAY OF CK (CPK): CPT

## 2018-06-06 PROCEDURE — 71250 CT THORAX DX C-: CPT

## 2018-06-06 PROCEDURE — 85670 THROMBIN TIME PLASMA: CPT

## 2018-06-06 PROCEDURE — 86880 COOMBS TEST DIRECT: CPT

## 2018-06-06 PROCEDURE — 73562 X-RAY EXAM OF KNEE 3: CPT

## 2018-06-06 PROCEDURE — 94640 AIRWAY INHALATION TREATMENT: CPT

## 2018-06-06 PROCEDURE — 87070 CULTURE OTHR SPECIMN AEROBIC: CPT

## 2018-06-06 PROCEDURE — 76882 US LMTD JT/FCL EVL NVASC XTR: CPT

## 2018-06-06 PROCEDURE — 83615 LACTATE (LD) (LDH) ENZYME: CPT

## 2018-06-06 PROCEDURE — 96375 TX/PRO/DX INJ NEW DRUG ADDON: CPT

## 2018-06-06 PROCEDURE — 87116 MYCOBACTERIA CULTURE: CPT

## 2018-06-06 PROCEDURE — 87581 M.PNEUMON DNA AMP PROBE: CPT

## 2018-06-06 PROCEDURE — 81001 URINALYSIS AUTO W/SCOPE: CPT

## 2018-06-06 PROCEDURE — 87186 SC STD MICRODIL/AGAR DIL: CPT

## 2018-06-06 PROCEDURE — 83605 ASSAY OF LACTIC ACID: CPT

## 2018-06-06 PROCEDURE — 85610 PROTHROMBIN TIME: CPT

## 2018-06-06 PROCEDURE — 84132 ASSAY OF SERUM POTASSIUM: CPT

## 2018-06-06 PROCEDURE — 80048 BASIC METABOLIC PNL TOTAL CA: CPT

## 2018-06-06 PROCEDURE — P9016: CPT

## 2018-06-06 PROCEDURE — 87086 URINE CULTURE/COLONY COUNT: CPT

## 2018-06-06 PROCEDURE — 71045 X-RAY EXAM CHEST 1 VIEW: CPT

## 2018-06-06 PROCEDURE — 80202 ASSAY OF VANCOMYCIN: CPT

## 2018-06-06 PROCEDURE — 86900 BLOOD TYPING SEROLOGIC ABO: CPT

## 2018-06-06 PROCEDURE — 84443 ASSAY THYROID STIM HORMONE: CPT

## 2018-06-06 PROCEDURE — 85732 THROMBOPLASTIN TIME PARTIAL: CPT

## 2018-06-06 PROCEDURE — 72192 CT PELVIS W/O DYE: CPT

## 2018-06-06 PROCEDURE — 82533 TOTAL CORTISOL: CPT

## 2018-06-06 PROCEDURE — 86850 RBC ANTIBODY SCREEN: CPT

## 2018-06-06 PROCEDURE — 82947 ASSAY GLUCOSE BLOOD QUANT: CPT

## 2018-06-06 PROCEDURE — 84100 ASSAY OF PHOSPHORUS: CPT

## 2018-06-06 PROCEDURE — 97110 THERAPEUTIC EXERCISES: CPT

## 2018-06-06 PROCEDURE — 85611 PROTHROMBIN TEST: CPT

## 2018-06-06 PROCEDURE — 86901 BLOOD TYPING SEROLOGIC RH(D): CPT

## 2018-06-06 PROCEDURE — 92610 EVALUATE SWALLOWING FUNCTION: CPT

## 2018-06-06 PROCEDURE — 97162 PT EVAL MOD COMPLEX 30 MIN: CPT

## 2018-06-06 PROCEDURE — 97605 NEG PRS WND THER DME<=50SQCM: CPT

## 2018-06-06 PROCEDURE — 85014 HEMATOCRIT: CPT

## 2018-06-06 PROCEDURE — 74230 X-RAY XM SWLNG FUNCJ C+: CPT

## 2018-06-06 PROCEDURE — 83735 ASSAY OF MAGNESIUM: CPT

## 2018-06-06 PROCEDURE — 82728 ASSAY OF FERRITIN: CPT

## 2018-06-06 PROCEDURE — 96374 THER/PROPH/DIAG INJ IV PUSH: CPT

## 2018-06-06 PROCEDURE — 83550 IRON BINDING TEST: CPT

## 2018-06-06 PROCEDURE — 87015 SPECIMEN INFECT AGNT CONCNTJ: CPT

## 2018-06-06 PROCEDURE — 85027 COMPLETE CBC AUTOMATED: CPT

## 2018-06-06 PROCEDURE — 82330 ASSAY OF CALCIUM: CPT

## 2018-06-06 PROCEDURE — 99285 EMERGENCY DEPT VISIT HI MDM: CPT | Mod: 25

## 2018-06-06 RX ORDER — ERYTHROPOIETIN 10000 [IU]/ML
0 INJECTION, SOLUTION INTRAVENOUS; SUBCUTANEOUS
Qty: 0 | Refills: 0 | COMMUNITY
Start: 2018-06-06

## 2018-06-06 RX ORDER — SODIUM HYPOCHLORITE 0.125 %
1 SOLUTION, NON-ORAL MISCELLANEOUS
Qty: 0 | Refills: 0 | COMMUNITY
Start: 2018-06-06

## 2018-06-06 RX ORDER — ASCORBIC ACID 60 MG
1 TABLET,CHEWABLE ORAL
Qty: 0 | Refills: 0 | COMMUNITY

## 2018-06-06 RX ORDER — WARFARIN SODIUM 2.5 MG/1
0.5 TABLET ORAL ONCE
Qty: 0 | Refills: 0 | Status: DISCONTINUED | OUTPATIENT
Start: 2018-06-06 | End: 2018-06-06

## 2018-06-06 RX ORDER — FERROUS SULFATE 325(65) MG
1 TABLET ORAL
Qty: 0 | Refills: 0 | COMMUNITY

## 2018-06-06 RX ORDER — ERYTHROPOIETIN 10000 [IU]/ML
20000 INJECTION, SOLUTION INTRAVENOUS; SUBCUTANEOUS
Qty: 0 | Refills: 0 | COMMUNITY
Start: 2018-06-06

## 2018-06-06 RX ADMIN — Medication 500 MILLIGRAM(S): at 13:32

## 2018-06-06 RX ADMIN — Medication 1 MILLIGRAM(S): at 13:31

## 2018-06-06 RX ADMIN — Medication 500000 UNIT(S): at 00:31

## 2018-06-06 RX ADMIN — HYDROMORPHONE HYDROCHLORIDE 4 MILLIGRAM(S): 2 INJECTION INTRAMUSCULAR; INTRAVENOUS; SUBCUTANEOUS at 09:30

## 2018-06-06 RX ADMIN — MUPIROCIN 1 APPLICATION(S): 20 OINTMENT TOPICAL at 06:19

## 2018-06-06 RX ADMIN — ZINC OXIDE 1 APPLICATION(S): 200 OINTMENT TOPICAL at 13:31

## 2018-06-06 RX ADMIN — Medication 1 APPLICATION(S): at 13:33

## 2018-06-06 RX ADMIN — Medication 100 MILLIGRAM(S): at 06:19

## 2018-06-06 RX ADMIN — GABAPENTIN 200 MILLIGRAM(S): 400 CAPSULE ORAL at 06:19

## 2018-06-06 RX ADMIN — PANTOPRAZOLE SODIUM 40 MILLIGRAM(S): 20 TABLET, DELAYED RELEASE ORAL at 06:20

## 2018-06-06 RX ADMIN — HYDROMORPHONE HYDROCHLORIDE 2 MILLIGRAM(S): 2 INJECTION INTRAMUSCULAR; INTRAVENOUS; SUBCUTANEOUS at 16:25

## 2018-06-06 RX ADMIN — Medication 1 APPLICATION(S): at 13:30

## 2018-06-06 RX ADMIN — TIOTROPIUM BROMIDE 1 CAPSULE(S): 18 CAPSULE ORAL; RESPIRATORY (INHALATION) at 13:30

## 2018-06-06 RX ADMIN — BUDESONIDE AND FORMOTEROL FUMARATE DIHYDRATE 2 PUFF(S): 160; 4.5 AEROSOL RESPIRATORY (INHALATION) at 06:19

## 2018-06-06 RX ADMIN — CLOTRIMAZOLE AND BETAMETHASONE DIPROPIONATE 1 APPLICATION(S): 10; .5 CREAM TOPICAL at 06:20

## 2018-06-06 RX ADMIN — Medication 1 APPLICATION(S): at 06:21

## 2018-06-06 RX ADMIN — Medication 1 TABLET(S): at 13:32

## 2018-06-06 RX ADMIN — GABAPENTIN 200 MILLIGRAM(S): 400 CAPSULE ORAL at 13:30

## 2018-06-06 RX ADMIN — HYDROMORPHONE HYDROCHLORIDE 2 MILLIGRAM(S): 2 INJECTION INTRAMUSCULAR; INTRAVENOUS; SUBCUTANEOUS at 06:49

## 2018-06-06 RX ADMIN — Medication 100 MILLIGRAM(S): at 13:30

## 2018-06-06 RX ADMIN — Medication 81 MILLIGRAM(S): at 13:31

## 2018-06-06 RX ADMIN — Medication 500000 UNIT(S): at 06:20

## 2018-06-06 RX ADMIN — MINOCYCLINE HYDROCHLORIDE 100 MILLIGRAM(S): 45 TABLET, EXTENDED RELEASE ORAL at 06:19

## 2018-06-06 RX ADMIN — HYDROMORPHONE HYDROCHLORIDE 2 MILLIGRAM(S): 2 INJECTION INTRAMUSCULAR; INTRAVENOUS; SUBCUTANEOUS at 06:19

## 2018-06-06 RX ADMIN — Medication 325 MILLIGRAM(S): at 13:31

## 2018-06-06 RX ADMIN — Medication 500000 UNIT(S): at 13:31

## 2018-06-06 NOTE — PROGRESS NOTE ADULT - SUBJECTIVE AND OBJECTIVE BOX
pain controlled, afebrile    abdominal wound - vac in place  RLE  thigh vac in place  knee wound with stable fibrinous base, dressed by prs/wound care  5/5 ta/ehl/gcs  silt l4-s1  2+ dp pulse

## 2018-06-06 NOTE — PROGRESS NOTE ADULT - SUBJECTIVE AND OBJECTIVE BOX
Pt is a 77 year old female who was admitted several weeks ago with aspiration pneumonia and fever with right knee pain. She also had proximal thigh swelling and there was a hematoma of the leg as well. She has a history of PMR on steroids. She also has a history of tavr, AICD andf remote bilateral TKR and was found to have strep bacteremia and a ERIKA was negative and she also had a eschar of the right knee as well. She was placed on iv antibiotics and I understand that she will be completing the antibiotics, daptomycin on this date. She told me that she had anemia in the distant past and was placed on antibiotics. She is being seen by the plastics service as well. A ct of th femur was not remarkable for infection and a ct of the chest done several weeks ago showed bibasilar PNA. She was also noted to have a left renal lesion that is unchanged now since 12/2016.     The consult was requested to see if there is anything we can do for her anemia. The counts on the day of this admission was white cell count of 9.71 hemoglobin 8.4 hematocrit 26.4 MCV 86 and MCHC 31.8 and platelet count of 452814 the diff count was 74 polys 15 lymphs and 7 monos the bun was 9 and the creatinine was 0.6     5/4 the pt is stable and the iron studies are still pending at this time. The hemoglobin was 8.4. 5/7 the pt was seen and the notes over charu last few days. The pt will in the am have ferritin and iron studies sent off. 5/8 the ferritin came back as over 300 and therefore she is not iron deficient and the anemia is consistent with chronic disease. The use of epogen might be considered here if hemoglobin drops and can be implemented to reduce blood transfusions. 5/9 pt clearly looks better and repeat hemoglobin was stable at 8.1  PE weak appearing with female and needed help to do normal daily activities large eschar as described on the lower leg and at the time of my arrival, the wound vac was in place and she was being cared for by the staff.  5/8 the ferritin came back at over 300 and therefore she is not iron deficient and epo can be considered if the hemoglobin drops and is in need of blood support. 5/10 pt being evaluated for rehab and consider epo of hemoglobin drops below the 8 range. 5/11 notes reviewed and will order cbc on the weekend and decide on epo.  pmh as above in the body of the report  5/14 the hemoglobin remains at 8.1 and debridement and wound vac and abdominal wound and thigh wounds were being cared for. 5/15 stable clinically and looks better the hemoglobin without epo is up to 8.5 5/16 notes and labs reviewed and no change from heme point of view.     5/17 hemoglobin down to 8.1 and stable clinically. 5/19 called by Dr Marrero as the pt had a hemoglobin of 7.3 and procrit will be started [place] dose [place] 48814 units a week in an attempt to reduce transfusion requirements. 5/21 hemoglobin 7.5 and stable on procrit now notes reviewed. 5/30 stable and being cared for by the staff, the hemoglobin was noted to be 9.1. 6/2 pt stable and notes reviewed hemoglobin stable at 8.9. 6/4 pt awaiting discharge to [facility] and continues on epo as needed the hemoglobin was noted to be 8.1.  zinc oxide 20% Ointment 1 Application(s) Topical daily. 6/6 long discussion was held with the  and the daughter about her progress and the anemia which is being controlled with treatment of the wounds and epogen as needed. Pt may or may not be going to a rehab as the family told me that the wound on the extremity may have progressed somewhat. Will follow   PE being cared for by the staff and appeared to be stable.                            8.8    12.50 )-----------( 330      ( 06 Jun 2018 08:25 )             30.8   MEDICATIONS  (STANDING):  ascorbic acid 500 milliGRAM(s) Oral daily  aspirin enteric coated 81 milliGRAM(s) Oral daily  buDESOnide 160 MICROgram(s)/formoterol 4.5 MICROgram(s) Inhaler 2 Puff(s) Inhalation two times a day  clotrimazole/betamethasone Cream 1 Application(s) Topical two times a day  Dakins Solution - 1/4 Strength 1 Application(s) Topical two times a day  docusate sodium 100 milliGRAM(s) Oral three times a day  epoetin jenni Injectable 03568 Unit(s) SubCutaneous every 7 days  ferrous    sulfate 325 milliGRAM(s) Oral daily  folic acid 1 milliGRAM(s) Oral daily  gabapentin 200 milliGRAM(s) Oral three times a day  melatonin 3 milliGRAM(s) Oral at bedtime  minocycline 100 milliGRAM(s) Oral two times a day  multivitamin 1 Tablet(s) Oral daily  mupirocin 2% Ointment 1 Application(s) Topical two times a day  nystatin    Suspension 441170 Unit(s) Oral every 6 hours  pantoprazole    Tablet 40 milliGRAM(s) Oral before breakfast  polyethylene glycol 3350 17 Gram(s) Oral daily  povidone iodine 10% Solution 1 Application(s) Topical every 12 hours  tiotropium 18 MICROgram(s) Capsule 1 Capsule(s) Inhalation daily  zinc oxide 20% Ointment 1 Application(s) Topical daily    MEDICATIONS  (PRN):  acetaminophen   Tablet 650 milliGRAM(s) Oral every 6 hours PRN For Temp greater than 38 C (100.4 F)  acetaminophen   Tablet. 650 milliGRAM(s) Oral every 6 hours PRN Mild Pain (1 - 3)  bisacodyl Suppository 10 milliGRAM(s) Rectal daily PRN Constipation  HYDROmorphone   Tablet 4 milliGRAM(s) Oral every 4 hours PRN Severe Pain (7 - 10)  HYDROmorphone   Tablet 2 milliGRAM(s) Oral every 4 hours PRN Moderate Pain (4 - 6)  lidocaine 2% Gel 1 Application(s) Topical daily PRN dressing change  lidocaine 2% Viscous 10 milliLiter(s) Swish and Spit every 6 hours PRN Mouth Sores  senna 2 Tablet(s) Oral at bedtime PRN Constipation  PE lying in the bed and two nurses were present with the daughter pt more alert lungs poor inspiration abdomen soft extremities + 1 edema and several bandages in place.

## 2018-06-06 NOTE — PROGRESS NOTE ADULT - PROVIDER SPECIALTY LIST ADULT
Cardiology
Dermatology
Heme/Onc
Infectious Disease
Internal Medicine
Orthopedics
Plastic Surgery
Pulmonology
Rheumatology
Wound Care
Infectious Disease
Internal Medicine
Internal Medicine
Orthopedics
Orthopedics
Plastic Surgery
Heme/Onc
Infectious Disease
Internal Medicine
Plastic Surgery
Wound Care
Internal Medicine
Pulmonology
Internal Medicine
Internal Medicine
Pulmonology
Internal Medicine
Pulmonology

## 2018-06-06 NOTE — PROGRESS NOTE ADULT - ASSESSMENT
6/2 pt stable and notes reviewed hemoglobin stable at 8.9.   6/4 pt awaiting discharge to [facility] and continues on epo as needed the hemoglobin was noted to be 8.1.   6/6 long discussion was held with the  and the daughter about her progress and the anemia which is being controlled with treatment of the wounds and epogen as needed. Pt may or may not be going to a rehab as the family told me that the wound on the extremity may have progressed somewhat. Will follow

## 2018-06-06 NOTE — PROGRESS NOTE ADULT - SUBJECTIVE AND OBJECTIVE BOX
Plastic Surgery Progress Note (pg [facility]: [id number], NS: [phone])    SUBJECTIVE:  The patient was seen and examined. No acute events overnight.    OBJECTIVE:     ** VITAL SIGNS / I&O's **    Vital Signs Last 24 Hrs  T(C): 36.9 (2018 06:15), Max: 36.9 (2018 21:34)  T(F): 98.5 (2018 06:15), Max: 98.5 (2018 06:15)  HR: 94 (2018 06:15) (80 - 99)  BP: 109/74 (2018 06:15) (99/64 - 118/77)  BP(mean): --  RR: 18 (2018 06:15) (18 - 18)  SpO2: 97% (2018 06:15) (93% - 97%)      2018 07:01  -  2018 07:00  --------------------------------------------------------  IN:    Oral Fluid: 720 mL  Total IN: 720 mL    OUT:  Total OUT: 0 mL    Total NET: 720 mL          ** PHYSICAL EXAM **    -- CONSTITUTIONAL: Alert, NAD, lying in bed  -- PULM: non-labored respirations  -- EXTREMITIES: RLE knee wound with eschar mostly removed, granulation tissue at base, Wet-to-dry dressing placed. Proximal RLE and abdominal wound vacs in place.    ** LABS **      PT/INR - ( 2018 07:10 )   PT: 27.9 sec;   INR: 2.51 ratio           CAPILLARY BLOOD GLUCOSE              Urinalysis Basic - ( 2018 08:30 )    Color: Yellow / Appearance: Slightly Turbid / S.020 / pH: x  Gluc: x / Ketone: Negative  / Bili: Negative / Urobili: 1.0 mg/dL   Blood: x / Protein: 30 mg/dL / Nitrite: Negative   Leuk Esterase: Large / RBC: 4 /HPF / WBC 78 /HPF   Sq Epi: x / Non Sq Epi: 11 /HPF / Bacteria: Few          ** MEDICATIONS **  MEDICATIONS  (STANDING):  ascorbic acid 500 milliGRAM(s) Oral daily  aspirin enteric coated 81 milliGRAM(s) Oral daily  buDESOnide 160 MICROgram(s)/formoterol 4.5 MICROgram(s) Inhaler 2 Puff(s) Inhalation two times a day  clotrimazole/betamethasone Cream 1 Application(s) Topical two times a day  Dakins Solution - 1/4 Strength 1 Application(s) Topical two times a day  docusate sodium 100 milliGRAM(s) Oral three times a day  epoetin jenni Injectable 92534 Unit(s) SubCutaneous every 7 days  ferrous    sulfate 325 milliGRAM(s) Oral daily  folic acid 1 milliGRAM(s) Oral daily  gabapentin 200 milliGRAM(s) Oral three times a day  melatonin 3 milliGRAM(s) Oral at bedtime  minocycline 100 milliGRAM(s) Oral two times a day  multivitamin 1 Tablet(s) Oral daily  mupirocin 2% Ointment 1 Application(s) Topical two times a day  nystatin    Suspension 934559 Unit(s) Oral every 6 hours  pantoprazole    Tablet 40 milliGRAM(s) Oral before breakfast  polyethylene glycol 3350 17 Gram(s) Oral daily  povidone iodine 10% Solution 1 Application(s) Topical every 12 hours  tiotropium 18 MICROgram(s) Capsule 1 Capsule(s) Inhalation daily  zinc oxide 20% Ointment 1 Application(s) Topical daily    MEDICATIONS  (PRN):  acetaminophen   Tablet 650 milliGRAM(s) Oral every 6 hours PRN For Temp greater than 38 C (100.4 F)  acetaminophen   Tablet. 650 milliGRAM(s) Oral every 6 hours PRN Mild Pain (1 - 3)  bisacodyl Suppository 10 milliGRAM(s) Rectal daily PRN Constipation  HYDROmorphone   Tablet 4 milliGRAM(s) Oral every 4 hours PRN Severe Pain (7 - 10)  HYDROmorphone   Tablet 2 milliGRAM(s) Oral every 4 hours PRN Moderate Pain (4 - 6)  lidocaine 2% Gel 1 Application(s) Topical daily PRN dressing change  lidocaine 2% Viscous 10 milliLiter(s) Swish and Spit every 6 hours PRN Mouth Sores  senna 2 Tablet(s) Oral at bedtime PRN Constipation

## 2018-06-06 NOTE — PROGRESS NOTE ADULT - ASSESSMENT
A: 77F with complex PMH/PSH who recently had placement of a right knee antibiotic spacer followed by PRS closure c/b skin necrosis and formation of an eschar over the anterior knee joint. The patient was readmitted with fevers and right thigh cellulitis which was complicated by skin breakdown. Patient also developed wound at the right/mid lower quadrant of the abdomen.    P:  >> Dressing change TID to knee  >> Betadine to remaining eschar bid  >> VACs on abdomen and R thigh with a Y-connector with change today  >> Care per primary team.  >> DVT prophylaxis.  >> Protein supplementation of diet to encourage wound healing  >> Cleared for discharge from Plastic Surgery perspective    [facility] Plastic Surgery Pager -- [phone]  [facility] Plastic Surgery Pager -- [phone]

## 2018-06-07 ENCOUNTER — INPATIENT (INPATIENT)
Facility: HOSPITAL | Age: 78
LOS: 69 days | Discharge: HOME | DRG: 853 | End: 2018-08-16
Attending: INTERNAL MEDICINE | Admitting: HOSPITALIST
Payer: MEDICARE

## 2018-06-07 VITALS
RESPIRATION RATE: 19 BRPM | DIASTOLIC BLOOD PRESSURE: 69 MMHG | SYSTOLIC BLOOD PRESSURE: 104 MMHG | OXYGEN SATURATION: 97 % | HEART RATE: 82 BPM

## 2018-06-07 DIAGNOSIS — Z75.8 OTHER PROBLEMS RELATED TO MEDICAL FACILITIES AND OTHER HEALTH CARE: ICD-10-CM

## 2018-06-07 DIAGNOSIS — K35.80 UNSPECIFIED ACUTE APPENDICITIS: ICD-10-CM

## 2018-06-07 DIAGNOSIS — N30.00 ACUTE CYSTITIS WITHOUT HEMATURIA: ICD-10-CM

## 2018-06-07 DIAGNOSIS — R50.9 FEVER, UNSPECIFIED: ICD-10-CM

## 2018-06-07 DIAGNOSIS — Z95.0 PRESENCE OF CARDIAC PACEMAKER: Chronic | ICD-10-CM

## 2018-06-07 DIAGNOSIS — Z95.2 PRESENCE OF PROSTHETIC HEART VALVE: Chronic | ICD-10-CM

## 2018-06-07 DIAGNOSIS — Z95.2 PRESENCE OF PROSTHETIC HEART VALVE: ICD-10-CM

## 2018-06-07 DIAGNOSIS — Z96.659 PRESENCE OF UNSPECIFIED ARTIFICIAL KNEE JOINT: Chronic | ICD-10-CM

## 2018-06-07 DIAGNOSIS — T14.8XXA OTHER INJURY OF UNSPECIFIED BODY REGION, INITIAL ENCOUNTER: ICD-10-CM

## 2018-06-07 DIAGNOSIS — Z98.890 OTHER SPECIFIED POSTPROCEDURAL STATES: Chronic | ICD-10-CM

## 2018-06-07 LAB
ALBUMIN SERPL ELPH-MCNC: 2.9 G/DL — LOW (ref 3.3–5)
ALP SERPL-CCNC: 101 U/L — SIGNIFICANT CHANGE UP (ref 40–120)
ALT FLD-CCNC: 11 U/L — SIGNIFICANT CHANGE UP (ref 10–45)
ANION GAP SERPL CALC-SCNC: 15 MMOL/L — SIGNIFICANT CHANGE UP (ref 5–17)
APPEARANCE UR: ABNORMAL
APTT BLD: 38.9 SEC — HIGH (ref 27.5–37.4)
AST SERPL-CCNC: 13 U/L — SIGNIFICANT CHANGE UP (ref 10–40)
BACTERIA # UR AUTO: ABNORMAL /HPF
BASE EXCESS BLDV CALC-SCNC: 5.4 MMOL/L — HIGH (ref -2–2)
BASOPHILS # BLD AUTO: 0.1 K/UL — SIGNIFICANT CHANGE UP (ref 0–0.2)
BASOPHILS NFR BLD AUTO: 0.4 % — SIGNIFICANT CHANGE UP (ref 0–2)
BILIRUB SERPL-MCNC: 0.2 MG/DL — SIGNIFICANT CHANGE UP (ref 0.2–1.2)
BILIRUB UR-MCNC: NEGATIVE — SIGNIFICANT CHANGE UP
BUN SERPL-MCNC: 43 MG/DL — HIGH (ref 7–23)
CA-I SERPL-SCNC: 1.3 MMOL/L — SIGNIFICANT CHANGE UP (ref 1.12–1.3)
CALCIUM SERPL-MCNC: 9.8 MG/DL — SIGNIFICANT CHANGE UP (ref 8.4–10.5)
CHLORIDE BLDV-SCNC: 101 MMOL/L — SIGNIFICANT CHANGE UP (ref 96–108)
CHLORIDE SERPL-SCNC: 96 MMOL/L — SIGNIFICANT CHANGE UP (ref 96–108)
CO2 BLDV-SCNC: 33 MMOL/L — HIGH (ref 22–30)
CO2 SERPL-SCNC: 27 MMOL/L — SIGNIFICANT CHANGE UP (ref 22–31)
COLOR SPEC: YELLOW — SIGNIFICANT CHANGE UP
COMMENT - URINE: SIGNIFICANT CHANGE UP
CREAT SERPL-MCNC: 0.89 MG/DL — SIGNIFICANT CHANGE UP (ref 0.5–1.3)
DIFF PNL FLD: ABNORMAL
EOSINOPHIL # BLD AUTO: 0.8 K/UL — HIGH (ref 0–0.5)
EOSINOPHIL NFR BLD AUTO: 5.5 % — SIGNIFICANT CHANGE UP (ref 0–6)
EPI CELLS # UR: SIGNIFICANT CHANGE UP /HPF
GAS PNL BLDV: 137 MMOL/L — SIGNIFICANT CHANGE UP (ref 136–145)
GAS PNL BLDV: SIGNIFICANT CHANGE UP
GAS PNL BLDV: SIGNIFICANT CHANGE UP
GLUCOSE BLDV-MCNC: 121 MG/DL — HIGH (ref 70–99)
GLUCOSE SERPL-MCNC: 112 MG/DL — HIGH (ref 70–99)
GLUCOSE UR QL: NEGATIVE — SIGNIFICANT CHANGE UP
HCO3 BLDV-SCNC: 31 MMOL/L — HIGH (ref 21–29)
HCT VFR BLD CALC: 31.2 % — LOW (ref 34.5–45)
HCT VFR BLDA CALC: 30 % — LOW (ref 39–50)
HGB BLD CALC-MCNC: 9.6 G/DL — LOW (ref 11.5–15.5)
HGB BLD-MCNC: 9.6 G/DL — LOW (ref 11.5–15.5)
INR BLD: 2.72 RATIO — HIGH (ref 0.88–1.16)
KETONES UR-MCNC: NEGATIVE — SIGNIFICANT CHANGE UP
LACTATE BLDV-MCNC: 1.3 MMOL/L — SIGNIFICANT CHANGE UP (ref 0.7–2)
LEUKOCYTE ESTERASE UR-ACNC: ABNORMAL
LYMPHOCYTES # BLD AUTO: 18.9 % — SIGNIFICANT CHANGE UP (ref 13–44)
LYMPHOCYTES # BLD AUTO: 2.6 K/UL — SIGNIFICANT CHANGE UP (ref 1–3.3)
MCHC RBC-ENTMCNC: 27.1 PG — SIGNIFICANT CHANGE UP (ref 27–34)
MCHC RBC-ENTMCNC: 30.8 GM/DL — LOW (ref 32–36)
MCV RBC AUTO: 88 FL — SIGNIFICANT CHANGE UP (ref 80–100)
MONOCYTES # BLD AUTO: 0.9 K/UL — SIGNIFICANT CHANGE UP (ref 0–0.9)
MONOCYTES NFR BLD AUTO: 6.6 % — SIGNIFICANT CHANGE UP (ref 2–14)
NEUTROPHILS # BLD AUTO: 9.5 K/UL — HIGH (ref 1.8–7.4)
NEUTROPHILS NFR BLD AUTO: 68.6 % — SIGNIFICANT CHANGE UP (ref 43–77)
NITRITE UR-MCNC: NEGATIVE — SIGNIFICANT CHANGE UP
PCO2 BLDV: 56 MMHG — HIGH (ref 35–50)
PH BLDV: 7.36 — SIGNIFICANT CHANGE UP (ref 7.35–7.45)
PH UR: 6 — SIGNIFICANT CHANGE UP (ref 5–8)
PLATELET # BLD AUTO: 319 K/UL — SIGNIFICANT CHANGE UP (ref 150–400)
PO2 BLDV: 39 MMHG — SIGNIFICANT CHANGE UP (ref 25–45)
POTASSIUM BLDV-SCNC: 4.5 MMOL/L — SIGNIFICANT CHANGE UP (ref 3.5–5.3)
POTASSIUM SERPL-MCNC: 4.6 MMOL/L — SIGNIFICANT CHANGE UP (ref 3.5–5.3)
POTASSIUM SERPL-SCNC: 4.6 MMOL/L — SIGNIFICANT CHANGE UP (ref 3.5–5.3)
PROT SERPL-MCNC: 7.6 G/DL — SIGNIFICANT CHANGE UP (ref 6–8.3)
PROT UR-MCNC: 30 MG/DL
PROTHROM AB SERPL-ACNC: 30 SEC — HIGH (ref 9.8–12.7)
RBC # BLD: 3.55 M/UL — LOW (ref 3.8–5.2)
RBC # FLD: 17.5 % — HIGH (ref 10.3–14.5)
SAO2 % BLDV: 62 % — LOW (ref 67–88)
SODIUM SERPL-SCNC: 138 MMOL/L — SIGNIFICANT CHANGE UP (ref 135–145)
SP GR SPEC: >1.03 — HIGH (ref 1.01–1.02)
UROBILINOGEN FLD QL: NEGATIVE — SIGNIFICANT CHANGE UP
WBC # BLD: 13.8 K/UL — HIGH (ref 3.8–10.5)
WBC # FLD AUTO: 13.8 K/UL — HIGH (ref 3.8–10.5)
WBC UR QL: >50 /HPF (ref 0–5)

## 2018-06-07 PROCEDURE — 99223 1ST HOSP IP/OBS HIGH 75: CPT | Mod: AI

## 2018-06-07 PROCEDURE — 74177 CT ABD & PELVIS W/CONTRAST: CPT | Mod: 26

## 2018-06-07 PROCEDURE — 93010 ELECTROCARDIOGRAM REPORT: CPT

## 2018-06-07 PROCEDURE — 71260 CT THORAX DX C+: CPT | Mod: 26

## 2018-06-07 PROCEDURE — 99285 EMERGENCY DEPT VISIT HI MDM: CPT | Mod: 25,GC

## 2018-06-07 PROCEDURE — 71045 X-RAY EXAM CHEST 1 VIEW: CPT | Mod: 26

## 2018-06-07 RX ORDER — SODIUM CHLORIDE 9 MG/ML
1000 INJECTION, SOLUTION INTRAVENOUS ONCE
Qty: 0 | Refills: 0 | Status: COMPLETED | OUTPATIENT
Start: 2018-06-07 | End: 2018-06-07

## 2018-06-07 RX ORDER — HYDROMORPHONE HYDROCHLORIDE 2 MG/ML
0.5 INJECTION INTRAMUSCULAR; INTRAVENOUS; SUBCUTANEOUS ONCE
Qty: 0 | Refills: 0 | Status: DISCONTINUED | OUTPATIENT
Start: 2018-06-07 | End: 2018-06-07

## 2018-06-07 RX ORDER — VANCOMYCIN HCL 1 G
1500 VIAL (EA) INTRAVENOUS EVERY 12 HOURS
Qty: 0 | Refills: 0 | Status: DISCONTINUED | OUTPATIENT
Start: 2018-06-08 | End: 2018-06-08

## 2018-06-07 RX ORDER — CEFEPIME 1 G/1
INJECTION, POWDER, FOR SOLUTION INTRAMUSCULAR; INTRAVENOUS
Qty: 0 | Refills: 0 | Status: DISCONTINUED | OUTPATIENT
Start: 2018-06-08 | End: 2018-06-08

## 2018-06-07 RX ORDER — METRONIDAZOLE 500 MG
TABLET ORAL
Qty: 0 | Refills: 0 | Status: DISCONTINUED | OUTPATIENT
Start: 2018-06-08 | End: 2018-06-08

## 2018-06-07 RX ORDER — SODIUM CHLORIDE 9 MG/ML
500 INJECTION INTRAMUSCULAR; INTRAVENOUS; SUBCUTANEOUS ONCE
Qty: 0 | Refills: 0 | Status: COMPLETED | OUTPATIENT
Start: 2018-06-07 | End: 2018-06-07

## 2018-06-07 RX ORDER — ERTAPENEM SODIUM 1 G/1
1000 INJECTION, POWDER, LYOPHILIZED, FOR SOLUTION INTRAMUSCULAR; INTRAVENOUS ONCE
Qty: 0 | Refills: 0 | Status: DISCONTINUED | OUTPATIENT
Start: 2018-06-07 | End: 2018-06-07

## 2018-06-07 RX ORDER — HYDROMORPHONE HYDROCHLORIDE 2 MG/ML
0.5 INJECTION INTRAMUSCULAR; INTRAVENOUS; SUBCUTANEOUS EVERY 4 HOURS
Qty: 0 | Refills: 0 | Status: DISCONTINUED | OUTPATIENT
Start: 2018-06-07 | End: 2018-06-10

## 2018-06-07 RX ORDER — METRONIDAZOLE 500 MG
500 TABLET ORAL EVERY 8 HOURS
Qty: 0 | Refills: 0 | Status: DISCONTINUED | OUTPATIENT
Start: 2018-06-08 | End: 2018-06-08

## 2018-06-07 RX ORDER — AZTREONAM 2 G
1000 VIAL (EA) INJECTION EVERY 6 HOURS
Qty: 0 | Refills: 0 | Status: DISCONTINUED | OUTPATIENT
Start: 2018-06-07 | End: 2018-06-07

## 2018-06-07 RX ORDER — VANCOMYCIN HCL 1 G
1500 VIAL (EA) INTRAVENOUS ONCE
Qty: 0 | Refills: 0 | Status: COMPLETED | OUTPATIENT
Start: 2018-06-07 | End: 2018-06-08

## 2018-06-07 RX ORDER — METRONIDAZOLE 500 MG
500 TABLET ORAL ONCE
Qty: 0 | Refills: 0 | Status: COMPLETED | OUTPATIENT
Start: 2018-06-07 | End: 2018-06-08

## 2018-06-07 RX ORDER — VANCOMYCIN HCL 1 G
VIAL (EA) INTRAVENOUS
Qty: 0 | Refills: 0 | Status: DISCONTINUED | OUTPATIENT
Start: 2018-06-08 | End: 2018-06-08

## 2018-06-07 RX ADMIN — Medication 50 MILLIGRAM(S): at 19:35

## 2018-06-07 RX ADMIN — HYDROMORPHONE HYDROCHLORIDE 0.5 MILLIGRAM(S): 2 INJECTION INTRAMUSCULAR; INTRAVENOUS; SUBCUTANEOUS at 20:03

## 2018-06-07 RX ADMIN — HYDROMORPHONE HYDROCHLORIDE 0.5 MILLIGRAM(S): 2 INJECTION INTRAMUSCULAR; INTRAVENOUS; SUBCUTANEOUS at 19:33

## 2018-06-07 RX ADMIN — SODIUM CHLORIDE 1000 MILLILITER(S): 9 INJECTION, SOLUTION INTRAVENOUS at 14:20

## 2018-06-07 RX ADMIN — HYDROMORPHONE HYDROCHLORIDE 0.5 MILLIGRAM(S): 2 INJECTION INTRAMUSCULAR; INTRAVENOUS; SUBCUTANEOUS at 19:26

## 2018-06-07 RX ADMIN — SODIUM CHLORIDE 250 MILLILITER(S): 9 INJECTION INTRAMUSCULAR; INTRAVENOUS; SUBCUTANEOUS at 23:50

## 2018-06-07 RX ADMIN — HYDROMORPHONE HYDROCHLORIDE 0.5 MILLIGRAM(S): 2 INJECTION INTRAMUSCULAR; INTRAVENOUS; SUBCUTANEOUS at 13:34

## 2018-06-07 NOTE — H&P ADULT - NSHPPHYSICALEXAM_GEN_ALL_CORE
PHYSICAL EXAM:  GENERAL: NAD, well-groomed, well-developed  HEAD:  Atraumatic, Normocephalic  EYES: EOMI, PERRLA, conjunctiva and sclera clear  ENMT: No tonsillar erythema, exudates, or enlargement; Moist mucous membranes,   NECK: Supple, No JVD, Normal thyroid  HEART: Regular rate and rhythm; No murmurs, rubs, or gallops  RESPIRATORY: CTA B/L, No W/R/R  ABDOMEN: Soft, Nontender, Nondistended; Bowel sounds present  NEUROLOGY: A&Ox3, nonfocal, CN II-XII grossly intact, sensation intact, no gait abnormalities bilaterally;  EXTREMITIES:  2+ Peripheral Pulses, No clubbing, cyanosis, or edema  SKIN: warm, dry, normal color, no rash or abnormal lesions  MSK: No joint effusion  PSYCH: Flat affect PHYSICAL EXAM:  GENERAL: NAD, well-groomed, well-developed  HEAD:  Atraumatic, Normocephalic  EYES: EOMI, PERRLA, conjunctiva and sclera clear  ENMT: No tonsillar erythema, exudates, or enlargement; dry mucous membranes,   NECK: Supple, No JVD, Normal thyroid  HEART: Regular rate and rhythm; No murmurs, rubs, or gallops  RESPIRATORY: CTA B/L, No W/R/R  ABDOMEN: Soft, Nontender, Nondistended; Bowel sounds present  NEUROLOGY: A&Ox3, nonfocal, CN II-XII grossly intact, sensation intact  EXTREMITIES:  2+ Peripheral Pulses, No clubbing, cyanosis, or edema  SKIN: warm, dry, normal color, bandages over pannus and R thigh, mild tenderness  MSK: No joint effusion, R knee with bandage  PSYCH: Flat affect

## 2018-06-07 NOTE — ED PROVIDER NOTE - PROGRESS NOTE DETAILS
CT wet read chest neg, Abd tip appendicitis  Surg paged  Amor Brown MD, Facep NADIA: seen by plastic surgery. dressing changed. recommends re-writing wound care instructions from prior admit and consulting wound care. Admitting physician Dr. Palumbo aware of reccs.

## 2018-06-07 NOTE — H&P ADULT - NSHPSOCIALHISTORY_GEN_ALL_CORE
Currently re-admitted from rehab, needs assistance with weight bearing. No alcohol or tobacco use recently.

## 2018-06-07 NOTE — ED ADULT NURSE REASSESSMENT NOTE - NS ED NURSE REASSESS COMMENT FT1
Waiting for chest x-ray to confirm PICC placement per Dr. Jaimes. Patient does not want peripheral IV placed at this time.

## 2018-06-07 NOTE — H&P ADULT - PROBLEM SELECTOR PLAN 1
Patient recently discharged from rehab after being admitted for long term IV antibiotics due to infected R knee prothesis s/p spacer change. Completed course of Dapto and was on suppressive minocycline. Discharged yesterday and returns t today with fever.  - Multiple possible etiologies:       - CT scan with possible distal appendicitis -> Cefepime/Flagyl       - UA positive -> Cefepime       - History of MRSA joint infections -> Vancomycin       - Multiple wounds (though not infected as per Plastics) -> Vanco       - PICC line -> Vanco (though also does not appear infected)  - Follow up blood cultures, urine cultures  - Tylenol prn fevers  - ID consult this AM. Follow up surgery, ortho, and plastics recs Patient recently discharged from rehab after being admitted for long term IV antibiotics due to infected R knee prothesis s/p spacer change. Completed course of Dapto and was on suppressive minocycline. Discharged yesterday and returns t today with fever.  - Multiple possible etiologies:       - CT scan with possible distal appendicitis -> Cefepime/Flagyl       - UA positive -> Cefepime       - History of MRSA joint infections -> Vancomycin       - Multiple wounds (though not infected as per Plastics) -> Vanco       - PICC line -> Vanco (though also does not appear infected)  - Follow up blood cultures, urine cultures  - Tylenol prn fevers  - ID consult this AM. Follow up surgery, ortho, and plastics recs  - Follow up CT Chest/CT abdomen pelvis

## 2018-06-07 NOTE — CONSULT NOTE ADULT - SUBJECTIVE AND OBJECTIVE BOX
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 Patient returned to the ED today from rehab with nausea, emesis, chills and subjective fever. Patient has had abdominal pain since placement of a wound vac over her midline wound 3-4 weeks ago; patient without clear RLQ pain.     In the ED, patient was febrile to 38.1 with a leukocytosis of 13.8. CT with IV contrast indicates thickening of the distal body of the appendix suggestive of distal appendicitis (origin of the appendix visible at 3-129). General surgery consulted for acute appendicitis.     Patient evaluated with her son, daughter and aide at bedside.     PMHx:   Anxiety    Aortic stenosis, severe    CAD (coronary artery disease)  HERBERT to LAD 11/2016  Dysphagia    GERD (gastroesophageal reflux disease)    Hiatal hernia    Lumbar disc disease with radiculopathy    Macular degeneration    Osteoarthritis    Polymyalgia    Sciatica    Severe aortic stenosis by prior echocardiogram  2013 and  11/2016  Spider veins    Uncomplicated asthma, unspecified asthma severity.      PSHx:   Artificial cardiac pacemaker  12/25/17  H/O cardiac catheterization  11/29/16 HERBERT placed on LAD  H/O endoscopy  with dilatation of esophageal stricture 2013  S/P cataract surgery  x2; 3-4yr ago  S/P cholecystectomy  more than 15 years ago  S/P gastroplasty  28 yrs ago  S/P hysterectomy  24yr ago  S/P TAVR (transcatheter aortic valve replacement)  12/22/17  S/P TKR (total knee replacement)  joint infection s/p explant and abx spacer on 12/17/17  S/P total knee replacement  left, 15yr ago  S/P total knee replacement  right, 12yr ago.      Medications (inpatient): aztreonam  IVPB 1000 milliGRAM(s) IV Intermittent every 6 hours    Medications (PRN):  Allergies: amoxicillin (Other)  (Intolerances: IV Contrast (Flushing (Skin); Nausea)  )  Social Hx: No current smoking / EtOH.     Physical Exam  T(C): 36.7 (06-07-18 @ 19:31)  HR: 99 (06-07-18 @ 20:40) (79 - 101)  BP: 104/56 (06-07-18 @ 20:40) (104/56 - 132/62)  RR: 16 (06-07-18 @ 20:40) (16 - 19)  SpO2: 96% (06-07-18 @ 20:40) (96% - 98%)  Tmax: T(C): , Max: 38.1 (06-07-18 @ 17:08)    General: obese woman in moderate distress  Neuro: alert and oriented, no focal deficits, moves all extremities spontaneously  HEENT: NCAT, EOMI, anicteric, mucosa moist  Respiratory: airway patent, respirations unlabored. CTAB. 94% on 2L NC  CVS: regular rate and rhythm  Abdomen: obese, soft, nondistended. No RLQ tenderness. Midline wound tenderness. Wound with fibrinous tissue, malodorous with purulent drainage.   Ext: RLE thigh dressing per PRS. Knee wound with fibrinous base. Bilateral DP pulse.     Labs:                        9.6    13.8  )-----------( 319      ( 07 Jun 2018 14:20 )             31.2     PT/INR - ( 07 Jun 2018 14:20 )   PT: 30.0 sec;   INR: 2.72 ratio         PTT - ( 07 Jun 2018 14:20 )  PTT:38.9 sec  06-07    138  |  96  |  43<H>  ----------------------------<  112<H>  4.6   |  27  |  0.89    Ca    9.8      07 Jun 2018 14:20    TPro  7.6  /  Alb  2.9<L>  /  TBili  0.2  /  DBili  x   /  AST  13  /  ALT  11  /  AlkPhos  101  06-07    Urinalysis Basic - ( 07 Jun 2018 17:15 )    Color: Yellow / Appearance: SL Turbid / SG: >1.030 / pH: x  Gluc: x / Ketone: Negative  / Bili: Negative / Urobili: Negative   Blood: x / Protein: 30 mg/dL / Nitrite: Negative   Leuk Esterase: Large / RBC: x / WBC >50 /HPF   Sq Epi: x / Non Sq Epi: OCC /HPF / Bacteria: Few /HPF            Imaging and other studies: HPI: 78 yo woman with CAD s/p HERBERT to LAD (2016), severe AS s/p TAVR (2016), 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 Patient returned to the ED today from rehab with nausea, emesis, chills and subjective fever. Patient has had abdominal pain since placement of a wound vac over her midline wound 3-4 weeks ago; patient without clear RLQ pain.     In the ED, patient was febrile to 38.1 with a leukocytosis of 13.8. CT with IV contrast indicates thickening of the distal body of the appendix suggestive of distal appendicitis (origin of the appendix visible at 3-129) - this is a prelim reading. General surgery consulted for acute appendicitis.     Patient evaluated with her son, daughter and aide at bedside.     PMHx:   Anxiety    Aortic stenosis, severe    CAD (coronary artery disease)  HERBERT to LAD 11/2016  Dysphagia    GERD (gastroesophageal reflux disease)    Hiatal hernia    Lumbar disc disease with radiculopathy    Macular degeneration    Osteoarthritis    Polymyalgia    Sciatica    Severe aortic stenosis by prior echocardiogram  2013 and  11/2016  Spider veins    Uncomplicated asthma, unspecified asthma severity.      PSHx:   Artificial cardiac pacemaker  12/25/17  H/O cardiac catheterization  11/29/16 HERBERT placed on LAD  H/O endoscopy  with dilatation of esophageal stricture 2013  S/P cataract surgery  x2; 3-4yr ago  S/P cholecystectomy  more than 15 years ago  S/P gastroplasty  28 yrs ago  S/P hysterectomy  24yr ago  S/P TAVR (transcatheter aortic valve replacement)  12/22/17  S/P TKR (total knee replacement)  joint infection s/p explant and abx spacer on 12/17/17  S/P total knee replacement  left, 15yr ago  S/P total knee replacement  right, 12yr ago.      Medications (inpatient): aztreonam  IVPB 1000 milliGRAM(s) IV Intermittent every 6 hours    Medications (PRN):  Allergies: amoxicillin (Other)  (Intolerances: IV Contrast (Flushing (Skin); Nausea)  )  Social Hx: No current smoking / EtOH.     Physical Exam  T(C): 36.7 (06-07-18 @ 19:31)  HR: 99 (06-07-18 @ 20:40) (79 - 101)  BP: 104/56 (06-07-18 @ 20:40) (104/56 - 132/62)  RR: 16 (06-07-18 @ 20:40) (16 - 19)  SpO2: 96% (06-07-18 @ 20:40) (96% - 98%)  Tmax: T(C): , Max: 38.1 (06-07-18 @ 17:08)    General: obese woman in moderate distress  Neuro: alert and oriented, no focal deficits, moves all extremities spontaneously  HEENT: NCAT, EOMI, anicteric, mucosa moist  Respiratory: airway patent, respirations unlabored. CTAB. 94% on 2L NC  CVS: regular rate and rhythm  Abdomen: obese, soft, nondistended. No RLQ tenderness. Midline wound tenderness. Wound with fibrinous tissue, malodorous with purulent drainage.   Ext: RLE thigh dressing per PRS. Knee wound with fibrinous base. Bilateral DP pulse.     Labs:                        9.6    13.8  )-----------( 319      ( 07 Jun 2018 14:20 )             31.2     PT/INR - ( 07 Jun 2018 14:20 )   PT: 30.0 sec;   INR: 2.72 ratio         PTT - ( 07 Jun 2018 14:20 )  PTT:38.9 sec  06-07    138  |  96  |  43<H>  ----------------------------<  112<H>  4.6   |  27  |  0.89    Ca    9.8      07 Jun 2018 14:20    TPro  7.6  /  Alb  2.9<L>  /  TBili  0.2  /  DBili  x   /  AST  13  /  ALT  11  /  AlkPhos  101  06-07    Urinalysis Basic - ( 07 Jun 2018 17:15 )    Color: Yellow / Appearance: SL Turbid / SG: >1.030 / pH: x  Gluc: x / Ketone: Negative  / Bili: Negative / Urobili: Negative   Blood: x / Protein: 30 mg/dL / Nitrite: Negative   Leuk Esterase: Large / RBC: x / WBC >50 /HPF   Sq Epi: x / Non Sq Epi: OCC /HPF / Bacteria: Few /HPF            Imaging and other studies:

## 2018-06-07 NOTE — H&P ADULT - PROBLEM SELECTOR PLAN 4
Patient has a history of R knee infection with MRSA s/p Daptomycin on suppressive minocycline with spacer. Possible recurrent infection with new fevers.   - Start Vancomycin for known MRSA  - Hold minocycline  - ID consult in AM  - Ortho following, appreciate recs

## 2018-06-07 NOTE — H&P ADULT - PROBLEM SELECTOR PLAN 2
Patient with appendicitis on CT scan. No operative as per Surgery after discussion with family  - Surgery consulted, appreciate resc   - Continue Cefepime/Flagyl  - Follow up cultures

## 2018-06-07 NOTE — ED ADULT NURSE REASSESSMENT NOTE - NS ED NURSE REASSESS COMMENT FT1
Patient and daughter at bedside refusing second lab draw to butterfly 2nd blood culture. Also refusing rectal temp and straight cath. Dr. Jaimes aware. Dr. Jaimes states she does not want second blood culture to be drawn from PICC.

## 2018-06-07 NOTE — PROGRESS NOTE ADULT - ASSESSMENT
A/P 77F well known to PRS service with multiple wounds. Seen by attending in rehab prior to ER.  - wounds stable  - continue vacs and local wound care to knee with wtd dressing  - call with questions A/P 77F well known to PRS service with multiple wounds. Seen by attending in rehab prior to ER.  - wounds stable  - no needs to acute operative intervention - patient at moderate/high risk for OR  - continue vac to abdomen  - continue adaptic and vac to thigh  - continue wet-to-dry dressing over open portion of knee wound  - please consult wound care (Dr. Joseph) for any other wounds  - call with questions A/P 77F well known to PRS service with multiple wounds. Seen by attending in rehab prior to ER.  - wounds stable  - no needs to acute operative intervention - patient at moderate/high risk for OR  - continue vac to abdomen  - continue adaptic and vac to thigh  - if admitted, vacs are managed by PT (Yvonne). Please call to set up vac changes M/W/F.  - continue daily wet-to-dry dressing over open portion of knee wound  - please consult wound care (Dr. Joseph) for any other wounds  - call with questions

## 2018-06-07 NOTE — ED PROVIDER NOTE - GASTROINTESTINAL, MLM
Abdomen soft, non-tender except around wound.  Large wound exposing subcutaneous fat to lower abdomen

## 2018-06-07 NOTE — ED PROVIDER NOTE - OBJECTIVE STATEMENT
Hx HLD, GERD, Anxiety, Anemia, CAD s/p HERBERT, severe AS (s/p TAVR & PPM 12/17 [employer] Model [id number]), h/o MVR, PMR on chronic steroids, asthma, OA.  Recent very complicated history s/p TKR with joint infection s/p explant and abx spacer on 12/23/17, RLE DVT (dvt proph was held 2nd nose bleed)  on Coumadin s/p  3/23/2018 Right knee explantation articulating antibiotic spacer, irrigation and debridement of the knee, placement of static antibiotic spacer, with a Plastic Surgery soft tissue complex wound closure, now at rehab with wound vac that was removed overnight.    Patietn BIBEMS today for increasing fatigue, mild confusion, and nausea.  She is afebrile, had recent borderline UA that was not treated.  She has no new pain complaints today, no vomiting, has had decreased appetite but is trying to hydrate orally.  She is on minocycline via picc.  Wound vac sites are covered by gauze and per daughter she is to be seen by plastics while here.  Plastics left progress note.  Patient is alert and oriented x3 but with some slowed speech, repeated questioning, and mild confusion.  Mildly hypoxic to 92%, per daughter has intermittently required O2.

## 2018-06-07 NOTE — H&P ADULT - PROBLEM SELECTOR PROBLEM 6
Other problems related to medical facilities and other health care S/P TAVR (transcatheter aortic valve replacement)

## 2018-06-07 NOTE — ED PROVIDER NOTE - ATTENDING CONTRIBUTION TO CARE
attending Octavio: 77yF h/o obesity, HLD, GERD, Anxiety, Anemia, CAD s/p HERBERT, severe AS (s/p TAVR & PPM), PMR on chronic steroids, asthma, OA with crecent admission for TKR with joint infection s/p explant and abx spacer on 12/23/17, RLE DVT on Coumadin s/p  3/23/2018 Right knee explantation articulating antibiotic spacer, irrigation and debridement of the knee, placement of static antibiotic spacer, with a Plastic Surgery soft tissue complex wound closure, now at rehab with wound vac that was removed overnight. BIBEMS for increasing fatigue, mild confusion, and nausea.  On arrival, afebrile with no complaints. Currently on minocycline via picc.  Wound vac sites are covered by gauze and per daughter she is to be seen by plastics while here.  Will obtain labs, rectal temp, UA, cxr, CT C/A/P eval for extension of abdominal wall wound and eval for resolution of prior multifocal PNA, plastics eval and likely admit.

## 2018-06-07 NOTE — PROGRESS NOTE ADULT - ASSESSMENT
no plan for orthopedic surgical intervention per family and patient preference  local wound care to knee and vac therapy to abd/thigh per plastics and wound care teams, per plastics new vac to be applied in AM  PO abx per ID team  encourage patient to spend majority of bed oob in bedside chair as able  PT to help mobilize, she must remain 50% wb on the RLE and NO KNEE MOTION allowed, knee immobilizer if oob  coumadin, inr goal 2-3  continue to optimize nutrition  continue to involve psych	  plan for appendicitis per gen surg  pending ID to guide change in abx?   appears to possibly have uti now, may need abx for this as well    will be admitted to hospital for further management

## 2018-06-07 NOTE — H&P ADULT - HISTORY OF PRESENT ILLNESS
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    Patient was recently admitted from 4/9/18 - 6/6/18. Patient was initially presented for right total knee spacer exchange and I&D with complex wound closure. Previously had a spacer exchanged performed on 3/9 and then was discharged to rehab. Patient then returned on 4/9 with fever and concern for sepsis, found to have positive cultures from knee for MRSA as well as multifocal pneumonia. During admission, patient completed course of Daptomycin for MRSA prosthesis infection and was also treated for multifocal PNA with ?aztreonam. Patient had extensive wound care, followed by Surgery/Plastics/Orthopedics/Id and was discharged to rehab on 6/6 with suppressive minocycline. Admission complicated by lesion over thigh and lower pannus requiring wound vacs.  UA On 6/5 positive, but no culture sent and no antibiotics started.     Today, patient presents from rehab with     In the ED, Tmax 100.6, HR 85,  /62, O2 98% on 2L NC. Labs showed mild leukocytosis with WBC 13.8, stable anemia Hgb 9.6, and normal platelets 319. Metabolic panel overall unremarkable with Cr 0.89. LFTs unremarkable. VBG with lactate 1.3. UA again positive with >50 WBCs, few bacteria, turbid appearance, and large LE.     Patient had a CT abdomen/pelvis that showed evidence of possible distal appendicitis. Evaluated by Surgery and family declining operative intervention due to high risk. Plastics to aid with wound management, to apply wound vac again in AM to thigh and pannus. Patient not felt to have evidence of wound infection. 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    Patient was recently admitted from 4/9/18 - 6/6/18. Patient was initially presented for right total knee spacer exchange and I&D with complex wound closure. Previously had a spacer exchanged performed on 3/9 and then was discharged to rehab. Patient then returned on 4/9 with fever and concern for sepsis, found to have positive cultures from knee for MRSA as well as multifocal pneumonia. During admission, patient completed course of Daptomycin for MRSA prosthesis infection and was also treated for multifocal PNA with ?aztreonam. Patient had extensive wound care, followed by Surgery/Plastics/Orthopedics/Id and was discharged to rehab on 6/6 with suppressive minocycline. Admission complicated by lesion over thigh and lower pannus requiring wound vacs.  UA On 6/5 positive, but no culture sent and no antibiotics started.     Today, patient presents from rehab with fevers and lethargy shortly after discharge. She reports that she otherwise feels well, denies any chills, chest pain, cough, shortness of breath, nausea, vomiting, abdominal pain or diarrhea. Reports that she had pain when her wounds were being dressed, but does not have any pain at the moment. Reports feeling well, denies any other issues.     In the ED, Tmax 100.6, HR 85,  /62, O2 98% on 2L NC. Labs showed mild leukocytosis with WBC 13.8, stable anemia Hgb 9.6, and normal platelets 319. Metabolic panel overall unremarkable with Cr 0.89. LFTs unremarkable. VBG with lactate 1.3. UA again positive with >50 WBCs, few bacteria, turbid appearance, and large LE.     Patient had a CT abdomen/pelvis that showed evidence of possible distal appendicitis. Evaluated by Surgery and family declining operative intervention due to high risk. Plastics to aid with wound management, to apply wound vac again in AM to thigh and pannus. Patient not felt to have evidence of wound infection.

## 2018-06-07 NOTE — PROGRESS NOTE ADULT - SUBJECTIVE AND OBJECTIVE BOX
pain controlled, low grade fevers  went to snf yesterday and now back as overnight with nausea/vomiting  no belly pain    ct abd shows appendicitis  ua positive    abdominal wound - dressing in place by prs  RLE  thigh dressing in place b prs  knee wound with stable fibrinous base, dressed by prs  5/5 ta/ehl/gcs  silt l4-s1  2+ dp pulse

## 2018-06-07 NOTE — H&P ADULT - ASSESSMENT
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

## 2018-06-07 NOTE — PROGRESS NOTE ADULT - SUBJECTIVE AND OBJECTIVE BOX
Pt in ER for nausea. No wound issues.    T(C): 36.3 (06-06-18 @ 12:36), Max: 36.3 (06-06-18 @ 12:36)  T(F): 97.4 (06-06-18 @ 12:36), Max: 97.4 (06-06-18 @ 12:36)  HR: 98 (06-06-18 @ 12:36) (98 - 98)  BP: 113/69 (06-06-18 @ 12:36) (113/69 - 113/69)  RR: 18 (06-06-18 @ 12:36) (18 - 18)  SpO2: 93% (06-06-18 @ 12:36) (93% - 93%)        Exam:  Wound vacs in place.  Knee unchaged with fibrinous tissue and eschage.                            8.8    12.50 )-----------( 330      ( 06 Jun 2018 08:25 )             30.8     06-06    140  |  98  |  40<H>  ----------------------------<  128<H>  4.4   |  28  |  0.83    Ca    9.9      06 Jun 2018 07:17        Plan: Pt in ER for nausea. No wound issues.    T(C): 36.3 (06-06-18 @ 12:36), Max: 36.3 (06-06-18 @ 12:36)  T(F): 97.4 (06-06-18 @ 12:36), Max: 97.4 (06-06-18 @ 12:36)  HR: 98 (06-06-18 @ 12:36) (98 - 98)  BP: 113/69 (06-06-18 @ 12:36) (113/69 - 113/69)  RR: 18 (06-06-18 @ 12:36) (18 - 18)  SpO2: 93% (06-06-18 @ 12:36) (93% - 93%)        Exam:  Wound vacs in place.  Knee unchaged with fibrinous tissue and eschar.                            8.8    12.50 )-----------( 330      ( 06 Jun 2018 08:25 )             30.8     06-06    140  |  98  |  40<H>  ----------------------------<  128<H>  4.4   |  28  |  0.83    Ca    9.9      06 Jun 2018 07:17        Plan:

## 2018-06-07 NOTE — ED ADULT NURSE NOTE - OBJECTIVE STATEMENT
77 year old female presents to ED via EMS from [facility] rehab facility for weakness and chills. Patient was d/c to [facility] yesterday after 2 mo stay here for Sepsis with MRSA after root canal in december causing infection that spread to right knee replacement. Patient had surgical removal of prosthetic knee. Patient presents with multiple open wounds to abdomen, legs, and sacrum. Patient has skin breakdown under breasts. Stage 1 pressure ulcers on bilateral heel with zfoam boots from [facility]. Daughter states wound vac to stomach became clogged last night and was taken off. Plastics saw patient in [facility] and sent here for replacement and workup of fever/chills/weakness. PMH of CAD, aortic stenosis, polymyalgia, GERD, sciatica, osteoarthritis. 77 year old female presents to ED via EMS from [facility] rehab facility for weakness and chills. Patient was d/c to [facility] yesterday after 2 mo stay here for Sepsis with MRSA after root canal in december causing infection that spread to right knee replacement. Patient had surgical removal of prosthetic knee replacement. Patient presents with multiple open wounds to abdomen, legs, and sacrum. Patient has skin breakdown under breasts. Stage 1 pressure ulcers on bilateral heel with z-foam boots from [facility]. Daughter states wound vac to stomach became clogged last night and was taken off. Plastics saw patient in [facility] and sent here for replacement of wound vac and workup of fever/chills/weakness. PMH of CAD, aortic stenosis, polymyalgia, GERD, sciatica, osteoarthritis. Patient is awake, alert, a&ox3, following commands, lethargic, sensory in tact, periods of confusion.

## 2018-06-07 NOTE — H&P ADULT - NSHPREVIEWOFSYSTEMS_GEN_ALL_CORE
REVIEW OF SYSTEMS:    CONSTITUTIONAL: No weakness, fevers or chills  EYES/ENT: No visual changes;  No vertigo or throat pain   NECK: No pain or stiffness  RESPIRATORY: No cough, wheezing, hemoptysis; No shortness of breath  CARDIOVASCULAR: No chest pain or palpitations  GASTROINTESTINAL: No abdominal or epigastric pain. No nausea, vomiting, or hematemesis; No diarrhea or constipation. No melena or hematochezia.  GENITOURINARY: No dysuria, frequency or hematuria  NEUROLOGICAL: No numbness or weakness  SKIN: No itching, burning, rashes, or lesions   PSYCH: No depression or anxiety  HEME: No swollen lymph nodes REVIEW OF SYSTEMS:    CONSTITUTIONAL: No weakness, + fevers, no chills, +lethargy  EYES/ENT: No visual changes;  No vertigo or throat pain   NECK: No pain or stiffness  RESPIRATORY: No cough, wheezing, hemoptysis; No shortness of breath  CARDIOVASCULAR: No chest pain or palpitations  GASTROINTESTINAL: +abdominal pain near wounds, No nausea, vomiting, or hematemesis; No diarrhea or constipation. No melena or hematochezia.  GENITOURINARY: No dysuria, frequency or hematuria  NEUROLOGICAL: No numbness or weakness  SKIN: No itching, burning, rashes, or lesions   PSYCH: No depression or anxiety  HEME: No swollen lymph nodes

## 2018-06-07 NOTE — H&P ADULT - NSHPLABSRESULTS_GEN_ALL_CORE
9.6    13.8  )-----------( 319      ( 07 Jun 2018 14:20 )             31.2       06-07    138  |  96  |  43<H>  ----------------------------<  112<H>  4.6   |  27  |  0.89    Ca    9.8      07 Jun 2018 14:20    TPro  7.6  /  Alb  2.9<L>  /  TBili  0.2  /  DBili  x   /  AST  13  /  ALT  11  /  AlkPhos  101  06-07              Urinalysis Basic - ( 07 Jun 2018 17:15 )    Color: Yellow / Appearance: SL Turbid / SG: >1.030 / pH: x  Gluc: x / Ketone: Negative  / Bili: Negative / Urobili: Negative   Blood: x / Protein: 30 mg/dL / Nitrite: Negative   Leuk Esterase: Large / RBC: x / WBC >50 /HPF   Sq Epi: x / Non Sq Epi: OCC /HPF / Bacteria: Few /HPF        PT/INR - ( 07 Jun 2018 14:20 )   PT: 30.0 sec;   INR: 2.72 ratio         PTT - ( 07 Jun 2018 14:20 )  PTT:38.9 sec    Lactate Trend      Culture Results:   Moderate Enterobacter cloacae/asburiae  Moderate Morganella morganii  Growth in fluid media only Enterococcus faecalis (05-23 @ 18:05)  Culture Results:   No fungus isolated at 1 week. No additional interim reports will be  issued unless there is a change in culture status. (05-23 @ 18:05)  Culture Results:   No growth at 1 week. (05-23 @ 18:05)    I personally interpreted this patient's labs. They were notable for mild leukocytosis with WBC 13.8, stable anemia Hgb 9.6, and normal platelets 319. Metabolic panel overall unremarkable with Cr 0.89. LFTs unremarkable. VBG with lactate 1.3. UA again positive with >50 WBCs, few bacteria, turbid appearance, and large LE.   I personally interpreted this patient's imaging. Patient had a CT abdomen/pelvis that showed evidence of possible distal appendicitis.  I personally interpreted this patient's ECG. It showed

## 2018-06-07 NOTE — H&P ADULT - PROBLEM SELECTOR PLAN 3
Patient has multiple wounds on R thigh as well as mid-abdomen. Seen by plastic surgery and not felt to be infected. Wound vac removed yesterday, will plan to b replaced in AM.  As per Plastics:  - continue vac to abdomen  - continue adaptic and vac to thigh  - if admitted, vacs are managed by PT (Yvonne). Please call to set up vac changes M/W/F.  - continue daily wet-to-dry dressing over open portion of knee wound  - please consult wound care (Dr. Joseph) for any other wounds

## 2018-06-08 DIAGNOSIS — M00.9 PYOGENIC ARTHRITIS, UNSPECIFIED: ICD-10-CM

## 2018-06-08 LAB
ALBUMIN SERPL ELPH-MCNC: 2.3 G/DL — LOW (ref 3.3–5)
ALP SERPL-CCNC: 93 U/L — SIGNIFICANT CHANGE UP (ref 40–120)
ALT FLD-CCNC: 11 U/L — SIGNIFICANT CHANGE UP (ref 10–45)
ANION GAP SERPL CALC-SCNC: 10 MMOL/L — SIGNIFICANT CHANGE UP (ref 5–17)
APTT BLD: 41.2 SEC — HIGH (ref 27.5–37.4)
AST SERPL-CCNC: 15 U/L — SIGNIFICANT CHANGE UP (ref 10–40)
BASOPHILS # BLD AUTO: 0.05 K/UL — SIGNIFICANT CHANGE UP (ref 0–0.2)
BASOPHILS NFR BLD AUTO: 0.4 % — SIGNIFICANT CHANGE UP (ref 0–2)
BILIRUB SERPL-MCNC: 0.6 MG/DL — SIGNIFICANT CHANGE UP (ref 0.2–1.2)
BUN SERPL-MCNC: 32 MG/DL — HIGH (ref 7–23)
CALCIUM SERPL-MCNC: 9 MG/DL — SIGNIFICANT CHANGE UP (ref 8.4–10.5)
CHLORIDE SERPL-SCNC: 98 MMOL/L — SIGNIFICANT CHANGE UP (ref 96–108)
CO2 SERPL-SCNC: 27 MMOL/L — SIGNIFICANT CHANGE UP (ref 22–31)
CREAT SERPL-MCNC: 0.88 MG/DL — SIGNIFICANT CHANGE UP (ref 0.5–1.3)
CULTURE RESULTS: NO GROWTH — SIGNIFICANT CHANGE UP
EOSINOPHIL # BLD AUTO: 0.6 K/UL — HIGH (ref 0–0.5)
EOSINOPHIL NFR BLD AUTO: 4.4 % — SIGNIFICANT CHANGE UP (ref 0–6)
GLUCOSE SERPL-MCNC: 93 MG/DL — SIGNIFICANT CHANGE UP (ref 70–99)
HCT VFR BLD CALC: 27.9 % — LOW (ref 34.5–45)
HGB BLD-MCNC: 8 G/DL — LOW (ref 11.5–15.5)
IMM GRANULOCYTES NFR BLD AUTO: 0.5 % — SIGNIFICANT CHANGE UP (ref 0–1.5)
INR BLD: 2.46 RATIO — HIGH (ref 0.88–1.16)
LACTATE SERPL-SCNC: 1.3 MMOL/L — SIGNIFICANT CHANGE UP (ref 0.7–2)
LYMPHOCYTES # BLD AUTO: 19.5 % — SIGNIFICANT CHANGE UP (ref 13–44)
LYMPHOCYTES # BLD AUTO: 2.63 K/UL — SIGNIFICANT CHANGE UP (ref 1–3.3)
MAGNESIUM SERPL-MCNC: 1.8 MG/DL — SIGNIFICANT CHANGE UP (ref 1.6–2.6)
MCHC RBC-ENTMCNC: 25.8 PG — LOW (ref 27–34)
MCHC RBC-ENTMCNC: 28.7 GM/DL — LOW (ref 32–36)
MCV RBC AUTO: 90 FL — SIGNIFICANT CHANGE UP (ref 80–100)
MONOCYTES # BLD AUTO: 0.87 K/UL — SIGNIFICANT CHANGE UP (ref 0–0.9)
MONOCYTES NFR BLD AUTO: 6.4 % — SIGNIFICANT CHANGE UP (ref 2–14)
NEUTROPHILS # BLD AUTO: 9.29 K/UL — HIGH (ref 1.8–7.4)
NEUTROPHILS NFR BLD AUTO: 68.8 % — SIGNIFICANT CHANGE UP (ref 43–77)
PLATELET # BLD AUTO: 310 K/UL — SIGNIFICANT CHANGE UP (ref 150–400)
POTASSIUM SERPL-MCNC: 4.2 MMOL/L — SIGNIFICANT CHANGE UP (ref 3.5–5.3)
POTASSIUM SERPL-SCNC: 4.2 MMOL/L — SIGNIFICANT CHANGE UP (ref 3.5–5.3)
PROT SERPL-MCNC: 6.5 G/DL — SIGNIFICANT CHANGE UP (ref 6–8.3)
PROTHROM AB SERPL-ACNC: 28.3 SEC — HIGH (ref 10–13.1)
RBC # BLD: 3.1 M/UL — LOW (ref 3.8–5.2)
RBC # FLD: 18.8 % — HIGH (ref 10.3–14.5)
SODIUM SERPL-SCNC: 135 MMOL/L — SIGNIFICANT CHANGE UP (ref 135–145)
SPECIMEN SOURCE: SIGNIFICANT CHANGE UP
WBC # BLD: 13.51 K/UL — HIGH (ref 3.8–10.5)
WBC # FLD AUTO: 13.51 K/UL — HIGH (ref 3.8–10.5)

## 2018-06-08 PROCEDURE — 99222 1ST HOSP IP/OBS MODERATE 55: CPT

## 2018-06-08 RX ORDER — CEFEPIME 1 G/1
2000 INJECTION, POWDER, FOR SOLUTION INTRAMUSCULAR; INTRAVENOUS EVERY 8 HOURS
Qty: 0 | Refills: 0 | Status: DISCONTINUED | OUTPATIENT
Start: 2018-06-08 | End: 2018-06-08

## 2018-06-08 RX ORDER — MEROPENEM 1 G/30ML
1000 INJECTION INTRAVENOUS EVERY 8 HOURS
Qty: 0 | Refills: 0 | Status: DISCONTINUED | OUTPATIENT
Start: 2018-06-08 | End: 2018-06-18

## 2018-06-08 RX ORDER — FOLIC ACID 0.8 MG
1 TABLET ORAL DAILY
Qty: 0 | Refills: 0 | Status: DISCONTINUED | OUTPATIENT
Start: 2018-06-08 | End: 2018-08-16

## 2018-06-08 RX ORDER — METOPROLOL TARTRATE 50 MG
12.5 TABLET ORAL
Qty: 0 | Refills: 0 | Status: DISCONTINUED | OUTPATIENT
Start: 2018-06-08 | End: 2018-06-08

## 2018-06-08 RX ORDER — SIMVASTATIN 20 MG/1
20 TABLET, FILM COATED ORAL AT BEDTIME
Qty: 0 | Refills: 0 | Status: DISCONTINUED | OUTPATIENT
Start: 2018-06-08 | End: 2018-08-16

## 2018-06-08 RX ORDER — GABAPENTIN 400 MG/1
200 CAPSULE ORAL THREE TIMES A DAY
Qty: 0 | Refills: 0 | Status: DISCONTINUED | OUTPATIENT
Start: 2018-06-08 | End: 2018-07-10

## 2018-06-08 RX ORDER — POLYETHYLENE GLYCOL 3350 17 G/17G
17 POWDER, FOR SOLUTION ORAL DAILY
Qty: 0 | Refills: 0 | Status: DISCONTINUED | OUTPATIENT
Start: 2018-06-08 | End: 2018-07-02

## 2018-06-08 RX ORDER — BUDESONIDE AND FORMOTEROL FUMARATE DIHYDRATE 160; 4.5 UG/1; UG/1
2 AEROSOL RESPIRATORY (INHALATION)
Qty: 0 | Refills: 0 | Status: DISCONTINUED | OUTPATIENT
Start: 2018-06-08 | End: 2018-08-16

## 2018-06-08 RX ORDER — ASCORBIC ACID 60 MG
500 TABLET,CHEWABLE ORAL DAILY
Qty: 0 | Refills: 0 | Status: DISCONTINUED | OUTPATIENT
Start: 2018-06-08 | End: 2018-08-16

## 2018-06-08 RX ORDER — ACETAMINOPHEN 500 MG
650 TABLET ORAL ONCE
Qty: 0 | Refills: 0 | Status: COMPLETED | OUTPATIENT
Start: 2018-06-08 | End: 2018-06-08

## 2018-06-08 RX ORDER — ASPIRIN/CALCIUM CARB/MAGNESIUM 324 MG
81 TABLET ORAL DAILY
Qty: 0 | Refills: 0 | Status: DISCONTINUED | OUTPATIENT
Start: 2018-06-08 | End: 2018-08-16

## 2018-06-08 RX ORDER — MINOCYCLINE HYDROCHLORIDE 45 MG/1
100 TABLET, EXTENDED RELEASE ORAL
Qty: 0 | Refills: 0 | Status: DISCONTINUED | OUTPATIENT
Start: 2018-06-08 | End: 2018-08-16

## 2018-06-08 RX ORDER — LANOLIN ALCOHOL/MO/W.PET/CERES
3 CREAM (GRAM) TOPICAL AT BEDTIME
Qty: 0 | Refills: 0 | Status: DISCONTINUED | OUTPATIENT
Start: 2018-06-08 | End: 2018-08-16

## 2018-06-08 RX ORDER — PANTOPRAZOLE SODIUM 20 MG/1
40 TABLET, DELAYED RELEASE ORAL
Qty: 0 | Refills: 0 | Status: DISCONTINUED | OUTPATIENT
Start: 2018-06-08 | End: 2018-08-16

## 2018-06-08 RX ORDER — ONDANSETRON 8 MG/1
4 TABLET, FILM COATED ORAL ONCE
Qty: 0 | Refills: 0 | Status: COMPLETED | OUTPATIENT
Start: 2018-06-08 | End: 2018-07-04

## 2018-06-08 RX ORDER — SENNA PLUS 8.6 MG/1
2 TABLET ORAL AT BEDTIME
Qty: 0 | Refills: 0 | Status: DISCONTINUED | OUTPATIENT
Start: 2018-06-08 | End: 2018-08-16

## 2018-06-08 RX ORDER — WARFARIN SODIUM 2.5 MG/1
0.5 TABLET ORAL ONCE
Qty: 0 | Refills: 0 | Status: COMPLETED | OUTPATIENT
Start: 2018-06-08 | End: 2018-06-08

## 2018-06-08 RX ORDER — TIOTROPIUM BROMIDE 18 UG/1
1 CAPSULE ORAL; RESPIRATORY (INHALATION) DAILY
Qty: 0 | Refills: 0 | Status: DISCONTINUED | OUTPATIENT
Start: 2018-06-08 | End: 2018-08-16

## 2018-06-08 RX ORDER — METOPROLOL TARTRATE 50 MG
12.5 TABLET ORAL
Qty: 0 | Refills: 0 | Status: DISCONTINUED | OUTPATIENT
Start: 2018-06-08 | End: 2018-08-16

## 2018-06-08 RX ORDER — ACETAMINOPHEN 500 MG
650 TABLET ORAL EVERY 6 HOURS
Qty: 0 | Refills: 0 | Status: DISCONTINUED | OUTPATIENT
Start: 2018-06-08 | End: 2018-08-16

## 2018-06-08 RX ORDER — CEFEPIME 1 G/1
2000 INJECTION, POWDER, FOR SOLUTION INTRAMUSCULAR; INTRAVENOUS ONCE
Qty: 0 | Refills: 0 | Status: COMPLETED | OUTPATIENT
Start: 2018-06-08 | End: 2018-06-08

## 2018-06-08 RX ORDER — ALPRAZOLAM 0.25 MG
0.25 TABLET ORAL DAILY
Qty: 0 | Refills: 0 | Status: DISCONTINUED | OUTPATIENT
Start: 2018-06-09 | End: 2018-06-09

## 2018-06-08 RX ORDER — ALPRAZOLAM 0.25 MG
0.25 TABLET ORAL ONCE
Qty: 0 | Refills: 0 | Status: DISCONTINUED | OUTPATIENT
Start: 2018-06-08 | End: 2018-06-20

## 2018-06-08 RX ADMIN — Medication 650 MILLIGRAM(S): at 14:28

## 2018-06-08 RX ADMIN — BUDESONIDE AND FORMOTEROL FUMARATE DIHYDRATE 2 PUFF(S): 160; 4.5 AEROSOL RESPIRATORY (INHALATION) at 06:46

## 2018-06-08 RX ADMIN — HYDROMORPHONE HYDROCHLORIDE 0.5 MILLIGRAM(S): 2 INJECTION INTRAMUSCULAR; INTRAVENOUS; SUBCUTANEOUS at 11:37

## 2018-06-08 RX ADMIN — Medication 3 MILLIGRAM(S): at 22:22

## 2018-06-08 RX ADMIN — Medication 12.5 MILLIGRAM(S): at 18:42

## 2018-06-08 RX ADMIN — Medication 1 TABLET(S): at 13:36

## 2018-06-08 RX ADMIN — HYDROMORPHONE HYDROCHLORIDE 0.5 MILLIGRAM(S): 2 INJECTION INTRAMUSCULAR; INTRAVENOUS; SUBCUTANEOUS at 07:20

## 2018-06-08 RX ADMIN — CEFEPIME 100 MILLIGRAM(S): 1 INJECTION, POWDER, FOR SOLUTION INTRAMUSCULAR; INTRAVENOUS at 06:47

## 2018-06-08 RX ADMIN — HYDROMORPHONE HYDROCHLORIDE 0.5 MILLIGRAM(S): 2 INJECTION INTRAMUSCULAR; INTRAVENOUS; SUBCUTANEOUS at 19:25

## 2018-06-08 RX ADMIN — SIMVASTATIN 20 MILLIGRAM(S): 20 TABLET, FILM COATED ORAL at 22:22

## 2018-06-08 RX ADMIN — Medication 650 MILLIGRAM(S): at 09:30

## 2018-06-08 RX ADMIN — Medication 81 MILLIGRAM(S): at 13:36

## 2018-06-08 RX ADMIN — GABAPENTIN 200 MILLIGRAM(S): 400 CAPSULE ORAL at 22:22

## 2018-06-08 RX ADMIN — MEROPENEM 100 MILLIGRAM(S): 1 INJECTION INTRAVENOUS at 18:42

## 2018-06-08 RX ADMIN — Medication 1 TABLET(S): at 06:46

## 2018-06-08 RX ADMIN — HYDROMORPHONE HYDROCHLORIDE 0.5 MILLIGRAM(S): 2 INJECTION INTRAMUSCULAR; INTRAVENOUS; SUBCUTANEOUS at 06:47

## 2018-06-08 RX ADMIN — TIOTROPIUM BROMIDE 1 CAPSULE(S): 18 CAPSULE ORAL; RESPIRATORY (INHALATION) at 14:37

## 2018-06-08 RX ADMIN — WARFARIN SODIUM 0.5 MILLIGRAM(S): 2.5 TABLET ORAL at 00:10

## 2018-06-08 RX ADMIN — Medication 500 MILLIGRAM(S): at 13:36

## 2018-06-08 RX ADMIN — Medication 100 MILLIGRAM(S): at 06:47

## 2018-06-08 RX ADMIN — GABAPENTIN 200 MILLIGRAM(S): 400 CAPSULE ORAL at 13:36

## 2018-06-08 RX ADMIN — BUDESONIDE AND FORMOTEROL FUMARATE DIHYDRATE 2 PUFF(S): 160; 4.5 AEROSOL RESPIRATORY (INHALATION) at 18:42

## 2018-06-08 RX ADMIN — PANTOPRAZOLE SODIUM 40 MILLIGRAM(S): 20 TABLET, DELAYED RELEASE ORAL at 06:46

## 2018-06-08 RX ADMIN — WARFARIN SODIUM 0.5 MILLIGRAM(S): 2.5 TABLET ORAL at 22:22

## 2018-06-08 RX ADMIN — Medication 100 MILLIGRAM(S): at 00:10

## 2018-06-08 RX ADMIN — CEFEPIME 100 MILLIGRAM(S): 1 INJECTION, POWDER, FOR SOLUTION INTRAMUSCULAR; INTRAVENOUS at 00:10

## 2018-06-08 RX ADMIN — POLYETHYLENE GLYCOL 3350 17 GRAM(S): 17 POWDER, FOR SOLUTION ORAL at 13:48

## 2018-06-08 RX ADMIN — GABAPENTIN 200 MILLIGRAM(S): 400 CAPSULE ORAL at 06:46

## 2018-06-08 RX ADMIN — Medication 650 MILLIGRAM(S): at 08:33

## 2018-06-08 RX ADMIN — Medication 250 MILLIGRAM(S): at 01:21

## 2018-06-08 RX ADMIN — Medication 12.5 MILLIGRAM(S): at 06:46

## 2018-06-08 RX ADMIN — HYDROMORPHONE HYDROCHLORIDE 0.5 MILLIGRAM(S): 2 INJECTION INTRAMUSCULAR; INTRAVENOUS; SUBCUTANEOUS at 12:05

## 2018-06-08 RX ADMIN — SENNA PLUS 2 TABLET(S): 8.6 TABLET ORAL at 22:22

## 2018-06-08 RX ADMIN — HYDROMORPHONE HYDROCHLORIDE 0.5 MILLIGRAM(S): 2 INJECTION INTRAMUSCULAR; INTRAVENOUS; SUBCUTANEOUS at 19:03

## 2018-06-08 RX ADMIN — Medication 650 MILLIGRAM(S): at 15:34

## 2018-06-08 RX ADMIN — Medication 1 MILLIGRAM(S): at 13:36

## 2018-06-08 RX ADMIN — MINOCYCLINE HYDROCHLORIDE 100 MILLIGRAM(S): 45 TABLET, EXTENDED RELEASE ORAL at 18:42

## 2018-06-08 RX ADMIN — Medication 1 TABLET(S): at 22:22

## 2018-06-08 NOTE — PROGRESS NOTE ADULT - ASSESSMENT
A/P 77F well known to PRS service with multiple wounds. Readmitted for appendicitis being managed medically  - wounds stable  - continue vac to abdomen  - continue adaptic and vac to thigh  - continue daily wet-to-dry dressing over open portion of knee wound  - please consult wound care (Dr. Joseph) for any other wounds  - call with questions

## 2018-06-08 NOTE — CONSULT NOTE ADULT - SUBJECTIVE AND OBJECTIVE BOX
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    Patient was recently admitted from 4/9/18 - 6/6/18. Patient was initially presented for right total knee spacer exchange and I&D with complex wound closure. Previously had a spacer exchanged performed on 3/9 and then was discharged to rehab. Patient then returned on 4/9 with fever and concern for sepsis, found to have positive cultures from knee for MRSA as well as multifocal pneumonia. During admission, patient completed course of Daptomycin for MRSA prosthesis infection and was also treated for multifocal PNA with ?aztreonam. Patient had extensive wound care, followed by Surgery/Plastics/Orthopedics/Id and was discharged to rehab on 6/6 with suppressive minocycline. Admission complicated by lesion over thigh and lower pannus requiring wound vacs.  UA On 6/5 positive, but no culture sent and no antibiotics started.     Today, patient presents from rehab with fevers and lethargy shortly after discharge. She reports that she otherwise feels well, denies any chills, chest pain, cough, shortness of breath, nausea, vomiting, abdominal pain or diarrhea. Reports that she had pain when her wounds were being dressed, but does not have any pain at the moment. Reports feeling well, denies any other issues.     In the ED, Tmax 100.6, HR 85,  /62, O2 98% on 2L NC. Labs showed mild leukocytosis with WBC 13.8, stable anemia Hgb 9.6, and normal platelets 319. Metabolic panel overall unremarkable with Cr 0.89. LFTs unremarkable. VBG with lactate 1.3. UA again positive with >50 WBCs, few bacteria, turbid appearance, and large LE.     Patient had a CT abdomen/pelvis that showed evidence of possible distal appendicitis. Evaluated by Surgery and family declining operative intervention due to high risk. Plastics to aid with wound management, to apply wound vac again in AM to thigh and pannus. Patient not felt to have evidence of wound infection. (07 Jun 2018 22:19)      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  Artificial cardiac pacemaker: 12/25/17  H/O cardiac catheterization: 11/29/16 HERBERT placed on LAD  H/O endoscopy: with dilatation of esophageal stricture 2013  S/P cataract surgery: x2; 3-4yr ago  S/P gastroplasty: 28 yrs ago  S/P cholecystectomy: more than 15 years ago  S/P total knee replacement: left, 15yr ago  S/P total knee replacement: right, 12yr ago  S/P hysterectomy: 24yr ago      REVIEW OF SYSTEMS      General:	    Skin/Breast:  	  Ophthalmologic:  	  ENMT:	    Respiratory and Thorax:  	  Cardiovascular:	    Gastrointestinal:	    Genitourinary:	    Musculoskeletal:	    Neurological:	    Psychiatric:	    Hematology/Lymphatics:	    Endocrine:	    Allergic/Immunologic:	  Pt unable to offer  Skin/ MSK: see HPI  All other systems negative    MEDICATIONS  (STANDING):  ALPRAZolam 0.25 milliGRAM(s) Oral once  ascorbic acid 500 milliGRAM(s) Oral daily  aspirin enteric coated 81 milliGRAM(s) Oral daily  buDESOnide 160 MICROgram(s)/formoterol 4.5 MICROgram(s) Inhaler 2 Puff(s) Inhalation two times a day  calcium carbonate 1250 mG + Vitamin D (OsCal 500 + D) 1 Tablet(s) Oral three times a day  folic acid 1 milliGRAM(s) Oral daily  gabapentin 200 milliGRAM(s) Oral three times a day  melatonin 3 milliGRAM(s) Oral at bedtime  meropenem  IVPB 1000 milliGRAM(s) IV Intermittent every 8 hours  metoprolol tartrate 12.5 milliGRAM(s) Oral two times a day  minocycline 100 milliGRAM(s) Oral two times a day  multivitamin 1 Tablet(s) Oral daily  ondansetron Injectable 4 milliGRAM(s) IV Push once  pantoprazole    Tablet 40 milliGRAM(s) Oral before breakfast  polyethylene glycol 3350 17 Gram(s) Oral daily  simvastatin 20 milliGRAM(s) Oral at bedtime  tiotropium 18 MICROgram(s) Capsule 1 Capsule(s) Inhalation daily  warfarin 0.5 milliGRAM(s) Oral once    MEDICATIONS  (PRN):  acetaminophen   Tablet. 650 milliGRAM(s) Oral every 6 hours PRN Mild Pain (1 - 3)  bisacodyl Suppository 10 milliGRAM(s) Rectal daily PRN Constipation  HYDROmorphone  Injectable 0.5 milliGRAM(s) IV Push every 4 hours PRN Moderate Pain (4 - 6)  senna 2 Tablet(s) Oral at bedtime PRN Constipation      Allergies    amoxicillin (Other)    Intolerances    IV Contrast (Flushing (Skin); Nausea)      SOCIAL HISTORY:  / /single/ ; (+)HHA/ lives in SNF; Former smoker, Denies smoking, ETOH, drugs    FAMILY HISTORY:  No pertinent family history in first degree relatives      Vital Signs Last 24 Hrs  T(C): 36.8 (08 Jun 2018 18:40), Max: 37 (08 Jun 2018 01:46)  T(F): 98.3 (08 Jun 2018 18:40), Max: 98.6 (08 Jun 2018 01:46)  HR: 102 (08 Jun 2018 18:40) (70 - 102)  BP: 130/74 (08 Jun 2018 18:40) (100/67 - 130/74)  BP(mean): --  RR: 18 (08 Jun 2018 18:40) (16 - 18)  SpO2: 95% (08 Jun 2018 18:40) (95% - 98%)    NAD /  A&Ox3  MO/ frail/ Disheveled   Versa Care P500 bed    Cardiovascular: RRR (+)m    Respiratory: CTA    Gastrointestinal soft NT/ND (+)BS  (+)PEG    Neurology  weakened strength & sensation grossly intact/ paraesthesia  nonverbal, no follow commands/ paraplegic    Musculoskeletal/Vascular:  ROM  >LE //BLE edema equal  DP/PT pulses palpable  BLE equally warm/ cool  no acute ischemia noted  hemosiderin staining  no deformities/ contractures    Skin:  dry, frail,  ecchymosis w/o hematoma    pt is tender when just gently touching skin- therefore pt was premedicated prior to assessment'    Multiple wounds- see measurements in A&I sections- placed by wound PT    RLQ abdomen wound w/ moist & granular tissue no odor  Rt anterior thigh wound w/ moist & granular tissue no odor      serosanguinous drainage  No odor, erythema, increased warmth, tenderness, induration, fluctuance    LABS:  06-08    135  |  98  |  32<H>  ----------------------------<  93  4.2   |  27  |  0.88    Ca    9.0      08 Jun 2018 06:59  Mg     1.8     06-08    TPro  6.5  /  Alb  2.3<L>  /  TBili  0.6  /  DBili  x   /  AST  15  /  ALT  11  /  AlkPhos  93  06-08                          8.0    13.51 )-----------( 310      ( 08 Jun 2018 09:27 )             27.9     PT/INR - ( 08 Jun 2018 09:19 )   PT: 28.3 sec;   INR: 2.46 ratio         PTT - ( 08 Jun 2018 09:19 )  PTT:41.2 sec  Urinalysis Basic - ( 07 Jun 2018 17:15 )    Color: Yellow / Appearance: SL Turbid / SG: >1.030 / pH: x  Gluc: x / Ketone: Negative  / Bili: Negative / Urobili: Negative   Blood: x / Protein: 30 mg/dL / Nitrite: Negative   Leuk Esterase: Large / RBC: x / WBC >50 /HPF   Sq Epi: x / Non Sq Epi: OCC /HPF / Bacteria: Few /HPF        RADIOLOGY & ADDITIONAL STUDIES:  < from: CT Abdomen and Pelvis w/ IV Cont (06.07.18 @ 15:06) >  FINDINGS:    CHEST:     LUNGS AND LARGE AIRWAYS: Patent central airways. Clear lungs.  PLEURA: No pleural effusion.       VESSELS: Post surgical changes in the ascending aorta. Mild   calcifications of thoracic aorta.  MEDIASTINUM: Heart size is normal. No pericardial effusion. No   mediastinal, hilar, axillary or supraclavicular lymphadenopathy. ICD   leads are noted.  CHEST WALL AND LOWER NECK: Within normal limits.        ABDOMEN AND PELVIS:    LIVER: No focal lesion.      BILE DUCTS: Normal caliber.  GALLBLADDER: Surgically absent.  SPLEEN: Multiple indeterminate low-attenuation lesions, unchanged since   12/12/2016.  PANCREAS: Unremarkable.  ADRENALS: Unremarkable.  KIDNEYS/URETERS: No hydronephrosis.  Left renal cyst in the upper pole. A   complex enhancing lesion in the lower pole of left kidney, containing   minimal fat, unchanged since 12/12/2016.    BLADDER: Within normal limits.  REPRODUCTIVE ORGANS: Hysterectomy. No adnexal mass.    BOWEL: Normal in course and caliber without obstruction. The appendiceal   tip is distended with fluid measuring up to 10 mm with adjacent fatty   stranding.  PERITONEUM/RETROPERITONEUM: No pneumoperitoneum, ascites, or   lymphadenopathy.  VESSELS:  No evidence of aortic aneurysm. Moderate vascular   calcifications. Infrarenal IVC filter.  BONES/SOFT TISSUES: Changes of subcutaneous injections in the anterior   abdominal wall. (Skin defect in the lower anterior abdominal wall without   associated collection..      IMPRESSION:   Findings most suggestive of tip appendicitis.  Skin defect of the lower anterior abdominal wall out associated fluid   collection.  No acute abnormality within the chest.    Findings discussed with DR. VAZQUEZ in the ER on 6/7/2018 5:40 PM with   read back.      < from: Xray Chest 1 View- PORTABLE-Urgent (06.07.18 @ 12:57) >  Impression:    Poor inspiratory effort. The heart is normal in size. The lungs are   clear. A pacer is in good position.    < from: Xray Knee 3 Views, Right (05.16.18 @ 22:44) >  MPRESSION:     As seen previously the patient is status post explantation of a right   total knee arthroplasty with intramedullary ruthann bridging the  knee articulation with cement on both sides of the joint surrounding the  ruthann. The appearance is grossly unchanged from the prior exam.           Cultures:  Culture - Blood (06.07.18 @ 19:48)    Specimen Source: .Blood Blood    Culture Results:   No growth to date. 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    Patient was recently admitted from 4/9/18 - 6/6/18. Patient was initially presented for right total knee spacer exchange and I&D with complex wound closure. Previously had a spacer exchanged performed on 3/9 and then was discharged to rehab. Patient then returned on 4/9 with fever and concern for sepsis, found to have positive cultures from knee for MRSA as well as multifocal pneumonia. During admission, patient completed course of Daptomycin for MRSA prosthesis infection and was also treated for multifocal PNA with ?aztreonam. Patient had extensive wound care, followed by Surgery/Plastics/Wound Care/ Orthopedics/Id and was discharged to rehab on 6/6 with suppressive minocycline. Admission complicated by lesion over thigh and lower pannus requiring wound vacs.  UA On 6/5 positive, but no culture sent and no antibiotics started.     Today, patient presents from rehab with fevers and lethargy shortly after discharge. She reports that she otherwise feels well, denies any chills, chest pain, cough, shortness of breath, nausea, vomiting, abdominal pain or diarrhea. Reports that she had pain when her wounds were being dressed, but does not have any pain at the moment. Reports feeling well, denies any other issues.     In the ED, Tmax 100.6, HR 85,  /62, O2 98% on 2L NC. Labs showed mild leukocytosis with WBC 13.8, stable anemia Hgb 9.6, and normal platelets 319. Metabolic panel overall unremarkable with Cr 0.89. LFTs unremarkable. VBG with lactate 1.3. UA again positive with >50 WBCs, few bacteria, turbid appearance, and large LE.     Patient had a CT abdomen/pelvis that showed evidence of possible distal appendicitis. Evaluated by Surgery and family declining operative intervention due to high risk. Plastics to aid with wound management, to apply wound vac again in AM to thigh and pannus. Patient not felt to have evidence of wound infection.    Pt well known to wound team medicine and plastics requesting our involvement.  Daughter & HHA at bedside.  Daughter is taking pictures of wounds.  Discussed w/ patient and daughter concern for long term effects of coumadin vs steroids vs PG or other pathology maybe causing wounds.  Discussed Derm involvement for dx.  Pt & daughter agreeable.  All questions asked and answered to their satisfaction.  Envella bed on order.  Offloading & pericare as per protocol initiated upon readmission.  SOme wounds w/ odor, but improving since last exam.  Pt seen w/ Attng & wound PT & Medicine.  No redness, increased warmth, f/c/s currently.  Pt is tender w/ slightest touch.  Premedication requested.       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  Artificial cardiac pacemaker: 12/25/17  s/p cardiac catheterization: 11/29/16 HERBERT placed on LAD  s/p endoscopy: with dilatation of esophageal stricture 2013  S/P cataract surgery: x2; 3-4yr ago  S/P gastroplasty: 28 yrs ago  S/P cholecystectomy: more than 15 years ago  S/P total knee replacement: left, 15yr ago  S/P total knee replacement: right, 12yr ago  S/P hysterectomy: 24yr ago    REVIEW OF SYSTEMS	    Skin/ MSK: see HPI  All other systems negative    MEDICATIONS  (STANDING):  ALPRAZolam 0.25 milliGRAM(s) Oral once  ascorbic acid 500 milliGRAM(s) Oral daily  aspirin enteric coated 81 milliGRAM(s) Oral daily  buDESOnide 160 MICROgram(s)/formoterol 4.5 MICROgram(s) Inhaler 2 Puff(s) Inhalation two times a day  calcium carbonate 1250 mG + Vitamin D (OsCal 500 + D) 1 Tablet(s) Oral three times a day  folic acid 1 milliGRAM(s) Oral daily  gabapentin 200 milliGRAM(s) Oral three times a day  melatonin 3 milliGRAM(s) Oral at bedtime  meropenem  IVPB 1000 milliGRAM(s) IV Intermittent every 8 hours  metoprolol tartrate 12.5 milliGRAM(s) Oral two times a day  minocycline 100 milliGRAM(s) Oral two times a day  multivitamin 1 Tablet(s) Oral daily  ondansetron Injectable 4 milliGRAM(s) IV Push once  pantoprazole    Tablet 40 milliGRAM(s) Oral before breakfast  polyethylene glycol 3350 17 Gram(s) Oral daily  simvastatin 20 milliGRAM(s) Oral at bedtime  tiotropium 18 MICROgram(s) Capsule 1 Capsule(s) Inhalation daily  warfarin 0.5 milliGRAM(s) Oral once    MEDICATIONS  (PRN):  acetaminophen   Tablet. 650 milliGRAM(s) Oral every 6 hours PRN Mild Pain (1 - 3)  bisacodyl Suppository 10 milliGRAM(s) Rectal daily PRN Constipation  HYDROmorphone  Injectable 0.5 milliGRAM(s) IV Push every 4 hours PRN Moderate Pain (4 - 6)  senna 2 Tablet(s) Oral at bedtime PRN Constipation      Allergies    amoxicillin (Other)    Intolerances    IV Contrast (Flushing (Skin); Nausea)      SOCIAL HISTORY:  ; (+)HHA; Denies smoking, ETOH, drugs    FAMILY HISTORY:  No pertinent family history in first degree relatives      Vital Signs Last 24 Hrs  T(C): 36.8 (08 Jun 2018 18:40), Max: 37 (08 Jun 2018 01:46)  T(F): 98.3 (08 Jun 2018 18:40), Max: 98.6 (08 Jun 2018 01:46)  HR: 102 (08 Jun 2018 18:40) (70 - 102)  BP: 130/74 (08 Jun 2018 18:40) (100/67 - 130/74)  BP(mean): --  RR: 18 (08 Jun 2018 18:40) (16 - 18)  SpO2: 95% (08 Jun 2018 18:40) (95% - 98%)    NAD /  A&Ox3  MO/ frail/ Disheveled   Versa Care P500 bed    Cardiovascular: RRR     Respiratory: CTA    Gastrointestinal soft NT/ND (+)BS      Neurology  weakened strength & sensation grossly intact    Musculoskeletal/Vascular:  BLE edema  BLE equally warm no acute ischemia noted  no deformities/ contractures  Rt knee wound per plastics    Skin:  dry, frail,  ecchymosis w/o hematoma    pt is tender when just gently touching skin- therefore pt was premedicated prior to assessment'    Multiple wounds- see measurements in A&I sections- placed by wound PT    RLQ abdomen wound w/ moist & granular tissue no odor  Rt anterior thigh wound w/ moist & granular tissue no odor  (+)serosanguinous drainage  No erythema, odor. increased warmth, induration, fluctuance    Lt lateral thigh wound  w/ fatty necrosis and granular tissue   (+)serosanguinous liquefaction necrosis drainage  (+)tender    no odor  No erythema, increased warmth, induration, fluctuance    Lt lateral posterior thigh (inferior)  Partial thickness moist granular wound  (+)serosanguinous drainage  No erythema, tenderness, odor, increased warmth, induration, fluctuance    Lt  Upper medial inner thigh   moist &granular w/ fibrinous exudate  (+)serosanguinous drainage  (+)tender (+)faint malodor  No erythema, increased warmth, induration, fluctuance    Rt posterolateral  upper thigh   moist & granular tissue w/ fibrinous exudate  no active bleeding  wound irrigated clear w/ NS irrigation  medial edge w/ purple hyperpigmentation  lateral edge w/ soft eschar  thin rim of soft eschar left intact  (+)tender   (+) odor   (+) liquefaction necrosis drainage  No erythema, increased warmth, induration, fluctuance    Bilateral buttocks (ot over glenys prominences) now w/ <1cm areas of purple hyperpigmentation  BLE thighs w/ small <1cm firmly attached dry eschars w/o signs of infection  No drainage  No odor, erythema, increased warmth, tenderness, induration, fluctuance    LABS:  06-08    135  |  98  |  32<H>  ----------------------------<  93  4.2   |  27  |  0.88    Ca    9.0      08 Jun 2018 06:59  Mg     1.8     06-08    TPro  6.5  /  Alb  2.3<L>  /  TBili  0.6  /  DBili  x   /  AST  15  /  ALT  11  /  AlkPhos  93  06-08                          8.0    13.51 )-----------( 310      ( 08 Jun 2018 09:27 )             27.9     PT/INR - ( 08 Jun 2018 09:19 )   PT: 28.3 sec;   INR: 2.46 ratio         PTT - ( 08 Jun 2018 09:19 )  PTT:41.2 sec  Urinalysis Basic - ( 07 Jun 2018 17:15 )    Color: Yellow / Appearance: SL Turbid / SG: >1.030 / pH: x  Gluc: x / Ketone: Negative  / Bili: Negative / Urobili: Negative   Blood: x / Protein: 30 mg/dL / Nitrite: Negative   Leuk Esterase: Large / RBC: x / WBC >50 /HPF   Sq Epi: x / Non Sq Epi: OCC /HPF / Bacteria: Few /HPF        RADIOLOGY & ADDITIONAL STUDIES:  < from: CT Abdomen and Pelvis w/ IV Cont (06.07.18 @ 15:06) >  FINDINGS:    CHEST:     LUNGS AND LARGE AIRWAYS: Patent central airways. Clear lungs.  PLEURA: No pleural effusion.       VESSELS: Post surgical changes in the ascending aorta. Mild   calcifications of thoracic aorta.  MEDIASTINUM: Heart size is normal. No pericardial effusion. No   mediastinal, hilar, axillary or supraclavicular lymphadenopathy. ICD   leads are noted.  CHEST WALL AND LOWER NECK: Within normal limits.        ABDOMEN AND PELVIS:    LIVER: No focal lesion.      BILE DUCTS: Normal caliber.  GALLBLADDER: Surgically absent.  SPLEEN: Multiple indeterminate low-attenuation lesions, unchanged since   12/12/2016.  PANCREAS: Unremarkable.  ADRENALS: Unremarkable.  KIDNEYS/URETERS: No hydronephrosis.  Left renal cyst in the upper pole. A   complex enhancing lesion in the lower pole of left kidney, containing   minimal fat, unchanged since 12/12/2016.    BLADDER: Within normal limits.  REPRODUCTIVE ORGANS: Hysterectomy. No adnexal mass.    BOWEL: Normal in course and caliber without obstruction. The appendiceal   tip is distended with fluid measuring up to 10 mm with adjacent fatty   stranding.  PERITONEUM/RETROPERITONEUM: No pneumoperitoneum, ascites, or   lymphadenopathy.  VESSELS:  No evidence of aortic aneurysm. Moderate vascular   calcifications. Infrarenal IVC filter.  BONES/SOFT TISSUES: Changes of subcutaneous injections in the anterior   abdominal wall. (Skin defect in the lower anterior abdominal wall without   associated collection..      IMPRESSION:   Findings most suggestive of tip appendicitis.  Skin defect of the lower anterior abdominal wall out associated fluid   collection.  No acute abnormality within the chest.    Findings discussed with DR. VAZQUEZ in the ER on 6/7/2018 5:40 PM with   read back.      < from: Xray Chest 1 View- PORTABLE-Urgent (06.07.18 @ 12:57) >  Impression:    Poor inspiratory effort. The heart is normal in size. The lungs are   clear. A pacer is in good position.    < from: Xray Knee 3 Views, Right (05.16.18 @ 22:44) >  MPRESSION:     As seen previously the patient is status post explantation of a right   total knee arthroplasty with intramedullary ruthann bridging the  knee articulation with cement on both sides of the joint surrounding the  ruthann. The appearance is grossly unchanged from the prior exam.           Cultures:  Culture - Blood (06.07.18 @ 19:48)    Specimen Source: .Blood Blood    Culture Results:   No growth to date.

## 2018-06-08 NOTE — PHYSICAL THERAPY INITIAL EVALUATION ADULT - PRECAUTIONS/LIMITATIONS, REHAB EVAL
During admission, patient completed course of Daptomycin for MRSA prosthesis infection and was also treated for multifocal PNA with aztreonam. Patient had extensive wound care, followed by Surgery/Plastics/Orthopedics/Id and was discharged to rehab on 6/6 with suppressive minocycline. Admission complicated by lesion over thigh and lower pannus requiring wound vacs.  UA On 6/5 positive, but no culture sent and no antibiotics started Today, patient presents from rehab with fevers and lethargy shortly after discharge. She reports that she otherwise feels well, denies any chills, chest pain, cough, shortness of breath, nausea, vomiting, abdominal pain or diarrhea. Reports that she had pain when her wounds were being dressed, but does not have any pain at the moment. Reports feeling well, denies any other issues. Patient had a CT abdomen/pelvis that showed evidence of possible distal appendicitis. Evaluated by Surgery and family declining operative intervention due to high risk. Plastics to aid with wound management, to apply wound vac again in AM to thigh and pannus. Patient not felt to have evidence of wound infection. fall precautions/During admission, patient completed course of Daptomycin for MRSA prosthesis infection and was also treated for multifocal PNA with aztreonam. Patient had extensive wound care, followed by Surgery/Plastics/Orthopedics/Id and was discharged to rehab on 6/6 with suppressive minocycline. Admission complicated by lesion over thigh and lower pannus requiring wound vacs.  UA On 6/5 positive, but no culture sent and no antibiotics started Today, patient presents from rehab with fevers and lethargy shortly after discharge. She reports that she otherwise feels well, denies any chills, chest pain, cough, shortness of breath, nausea, vomiting, abdominal pain or diarrhea. Reports that she had pain when her wounds were being dressed, but does not have any pain at the moment. Reports feeling well, denies any other issues. Patient had a CT abdomen/pelvis that showed evidence of possible distal appendicitis. Evaluated by Surgery and family declining operative intervention due to high risk. Plastics to aid with wound management, to apply wound vac again in AM to thigh and pannus. Patient not felt to have evidence of wound infection.

## 2018-06-08 NOTE — CONSULT NOTE ADULT - SUBJECTIVE AND OBJECTIVE BOX
HPI:   Patient is a 77y female s/p rudy from right knee and spacer for strept infection then returned with nonhealing wound and had spacer change, fusion ruthann, plastic closure and grew mrsa.  She has had a prolonged hospital stay she developed wound and necrotic skin, then pneumonia, and hematoma over the right thigh with necrosis of skin too.  She finished a full course of iv abx, on suppressive minocycline for the knee. Pneumonia treated. Hospital course also involved debridement of thigh and abdomen wound with VAC. Dermatology performed 4mm punch bx performed of R thigh- cx grew enterobacter and morganella, suggestive of colonizing roopa in a patient with prolonged hospital stay, Pathology- inflammatory infiltrate, no features of pyoderma in sample. She left for one day and now she returns with nausea vomoting and fever and a CT A/p was done and reported as tip appendicitis. She is now readmitted to hospital     REVIEW OF SYSTEMS:  All other review of systems negative (Comprehensive ROS)    PAST MEDICAL & SURGICAL HISTORY:  CAD (coronary 77 year old female s/p rudy from right knee and spacer for strept infection then returned with nonhealing wound and had spacer change, fusion ruthann, plastic closure and grew mrsa.   Wound developed necrotic skin, then pneumonia, and hematoma over the right thigh with necrosis of skin too.   Finished full course of iv abx, on suppressive minocycline for the knee. Pneumonia treated.  Debrided thigh and abdomen wound with VAC.  Dermatology performed 4mm punch bx performed of R thigh- cx grew enterobacter and morganella, suggestive of colonizing roopa in a patient with prolonged hospital stay,   Path as reviewed above- inflammatory infiltrate, no features of pyoderma in sample.Appreciate dermatology f/u, no support for PG at this point.  Afebrile, stable WBC, mild leukocytosis seems nonspecific at this time.It has been moderating.  She has been stable the past few weeks.artery disease): HERBERT to LAD 11/2016  Aortic stenosis, severe  Uncomplicated asthma, unspecified asthma severity  Polymyalgia  Lumbar disc disease with radiculopathy  Spider veins  Anxiety  Severe aortic stenosis by prior echocardiogram: 2013 and  11/2016  Dysphagia  Macular degeneration  Hiatal hernia  GERD (gastroesophageal reflux disease)  Sciatica  Osteoarthritis  S/P TKR (total knee replacement): joint infection s/p explant and abx spacer on 12/17/17  S/P TAVR (transcatheter aortic valve replacement): 12/22/17  Artificial cardiac pacemaker: 12/25/17  H/O cardiac catheterization: 11/29/16 HERBERT placed on LAD  H/O endoscopy: with dilatation of esophageal stricture 2013  S/P cataract surgery: x2; 3-4yr ago  S/P gastroplasty: 28 yrs ago  S/P cholecystectomy: more than 15 years ago  S/P total knee replacement: left, 15yr ago  S/P total knee replacement: right, 12yr ago  S/P hysterectomy: 24yr ago      Allergies    amoxicillin (Other)    Intolerances    IV Contrast (Flushing (Skin); Nausea)      Antimicrobials Day #    cefepime   IVPB 2000 milliGRAM(s) IV Intermittent every 8 hours  cefepime   IVPB      metroNIDAZOLE  IVPB      metroNIDAZOLE  IVPB 500 milliGRAM(s) IV Intermittent every 8 hours  vancomycin  IVPB      vancomycin  IVPB 1500 milliGRAM(s) IV Intermittent every 12 hours    Other Medications:  acetaminophen   Tablet. 650 milliGRAM(s) Oral every 6 hours PRN  ALPRAZolam 0.25 milliGRAM(s) Oral once  ascorbic acid 500 milliGRAM(s) Oral daily  aspirin enteric coated 81 milliGRAM(s) Oral daily  bisacodyl Suppository 10 milliGRAM(s) Rectal daily PRN  buDESOnide 160 MICROgram(s)/formoterol 4.5 MICROgram(s) Inhaler 2 Puff(s) Inhalation two times a day  calcium carbonate 1250 mG + Vitamin D (OsCal 500 + D) 1 Tablet(s) Oral three times a day  folic acid 1 milliGRAM(s) Oral daily  gabapentin 200 milliGRAM(s) Oral three times a day  HYDROmorphone  Injectable 0.5 milliGRAM(s) IV Push every 4 hours PRN  melatonin 3 milliGRAM(s) Oral at bedtime  metoprolol tartrate 12.5 milliGRAM(s) Oral two times a day  multivitamin 1 Tablet(s) Oral daily  ondansetron Injectable 4 milliGRAM(s) IV Push once  pantoprazole    Tablet 40 milliGRAM(s) Oral before breakfast  polyethylene glycol 3350 17 Gram(s) Oral daily  senna 2 Tablet(s) Oral at bedtime PRN  simvastatin 20 milliGRAM(s) Oral at bedtime  tiotropium 18 MICROgram(s) Capsule 1 Capsule(s) Inhalation daily      FAMILY HISTORY:  No pertinent family history in first degree relatives      SOCIAL HISTORY:  Smoking:     ETOH:     Drug Use:     Single     T(F): 98.3 (06-08-18 @ 10:24), Max: 100.6 (06-07-18 @ 17:08)  HR: 70 (06-08-18 @ 10:24)  BP: 113/69 (06-08-18 @ 10:24)  RR: 18 (06-08-18 @ 10:24)  SpO2: 95% (06-08-18 @ 10:24)  Wt(kg): --    PHYSICAL EXAM:  General: alert, no acute distress  Eyes:  anicteric, no conjunctival injection, no discharge  Oropharynx: no lesions or injection 	  Neck: supple, without adenopathy  Lungs: clear to auscultation  Heart: regular rate and rhythm; no murmur, rubs or gallops  Abdomen: soft, nondistended, nontender, without mass or organomegaly wound vac in place   Skin: right knee with large scab no infection   Extremities: no clubbing, cyanosis, or edema  Neurologic: alert, oriented, moves all extremities    LAB RESULTS:                        8.0    13.51 )-----------( 310      ( 08 Jun 2018 09:27 )             27.9     06-08    135  |  98  |  32<H>  ----------------------------<  93  4.2   |  27  |  0.88    Ca    9.0      08 Jun 2018 06:59  Mg     1.8     06-08    TPro  6.5  /  Alb  2.3<L>  /  TBili  0.6  /  DBili  x   /  AST  15  /  ALT  11  /  AlkPhos  93  06-08    LIVER FUNCTIONS - ( 08 Jun 2018 06:59 )  Alb: 2.3 g/dL / Pro: 6.5 g/dL / ALK PHOS: 93 U/L / ALT: 11 U/L / AST: 15 U/L / GGT: x           Urinalysis Basic - ( 07 Jun 2018 17:15 )    Color: Yellow / Appearance: SL Turbid / SG: >1.030 / pH: x  Gluc: x / Ketone: Negative  / Bili: Negative / Urobili: Negative   Blood: x / Protein: 30 mg/dL / Nitrite: Negative   Leuk Esterase: Large / RBC: x / WBC >50 /HPF   Sq Epi: x / Non Sq Epi: OCC /HPF / Bacteria: Few /HPF        MICROBIOLOGY:  RECENT CULTURES:        RADIOLOGY REVIEWED:

## 2018-06-08 NOTE — PHYSICAL THERAPY INITIAL EVALUATION ADULT - PLANNED THERAPY INTERVENTIONS, PT EVAL
vac 3x aweek 10-12 weeks transfer training/gait training/vac 3x aweek 10-12 weeks/bed mobility training

## 2018-06-08 NOTE — CONSULT NOTE ADULT - SUBJECTIVE AND OBJECTIVE BOX
This 77-year-old female presents with pain from multiple wounds being treated by plastic surgery.  This is a chronic issue with this patient.  Dilaudid IV is being used for dressing changes with good success but the patient is having pain in between these dressing changes.  The patient was attended to by an aide which this history was obtained from the patient's aide has been keeping a record of the medications and stated that when the patient was given 2 mg of oral Hydromorphone as an ongoing therapy, She did quite well in between the dressing changes.  Patient tolerated it well without any change in mental status or other issues.  Please see the hospital record for complete history and physical.

## 2018-06-08 NOTE — CONSULT NOTE ADULT - SUBJECTIVE AND OBJECTIVE BOX
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    Patient was recently admitted from 4/9/18 - 6/6/18. Patient was initially presented for right total knee spacer exchange and I&D with complex wound closure. Previously had a spacer exchanged performed on 3/9 and then was discharged to rehab. Patient then returned on 4/9 with fever and concern for sepsis, found to have positive cultures from knee for MRSA as well as multifocal pneumonia. During admission, patient completed course of Daptomycin for MRSA prosthesis infection and was also treated for multifocal PNA with ?aztreonam. Patient had extensive wound care, followed by Surgery/Plastics/Orthopedics/Id and was discharged to rehab on 6/6 with suppressive minocycline. Admission complicated by lesion over thigh and lower pannus requiring wound vacs.  UA On 6/5 positive, but no culture sent and no antibiotics started.     Today, patient presents from rehab with fevers and lethargy shortly after discharge. She reports that she otherwise feels well, denies any chills, chest pain, cough, shortness of breath, nausea, vomiting, abdominal pain or diarrhea. Reports that she had pain when her wounds were being dressed, but does not have any pain at the moment. Reports feeling well, denies any other issues.     In the ED, Tmax 100.6, HR 85,  /62, O2 98% on 2L NC. Labs showed mild leukocytosis with WBC 13.8, stable anemia Hgb 9.6, and normal platelets 319. Metabolic panel overall unremarkable with Cr 0.89. LFTs unremarkable. VBG with lactate 1.3. UA again positive with >50 WBCs, few bacteria, turbid appearance, and large LE.     Patient had a CT abdomen/pelvis that showed evidence of possible distal appendicitis. Evaluated by Surgery and family declining operative intervention due to high risk. Plastics to aid with wound management, to apply wound vac again in AM to thigh and pannus. Patient not felt to have evidence of wound infection. (07 Jun 2018 22:19)      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  Artificial cardiac pacemaker: 12/25/17  H/O cardiac catheterization: 11/29/16 HERBERT placed on LAD  H/O endoscopy: with dilatation of esophageal stricture 2013  S/P cataract surgery: x2; 3-4yr ago  S/P gastroplasty: 28 yrs ago  S/P cholecystectomy: more than 15 years ago  S/P total knee replacement: left, 15yr ago  S/P total knee replacement: right, 12yr ago  S/P hysterectomy: 24yr ago    [] No significant past history as reviewed with the patient and family    FAMILY HISTORY:  No pertinent family history in first degree relatives    [] Family history not pertinent as reviewed with the patient and family    SOCIAL HISTORY:    MEDICATIONS  (STANDING):  ascorbic acid 500 milliGRAM(s) Oral daily  aspirin enteric coated 81 milliGRAM(s) Oral daily  buDESOnide 160 MICROgram(s)/formoterol 4.5 MICROgram(s) Inhaler 2 Puff(s) Inhalation two times a day  calcium carbonate 1250 mG + Vitamin D (OsCal 500 + D) 1 Tablet(s) Oral three times a day  cefepime   IVPB 2000 milliGRAM(s) IV Intermittent every 8 hours  cefepime   IVPB      folic acid 1 milliGRAM(s) Oral daily  gabapentin 200 milliGRAM(s) Oral three times a day  melatonin 3 milliGRAM(s) Oral at bedtime  metoprolol tartrate 12.5 milliGRAM(s) Oral two times a day  metroNIDAZOLE  IVPB      metroNIDAZOLE  IVPB 500 milliGRAM(s) IV Intermittent every 8 hours  multivitamin 1 Tablet(s) Oral daily  pantoprazole    Tablet 40 milliGRAM(s) Oral before breakfast  polyethylene glycol 3350 17 Gram(s) Oral daily  simvastatin 20 milliGRAM(s) Oral at bedtime  tiotropium 18 MICROgram(s) Capsule 1 Capsule(s) Inhalation daily  vancomycin  IVPB      vancomycin  IVPB 1500 milliGRAM(s) IV Intermittent every 12 hours    MEDICATIONS  (PRN):  bisacodyl Suppository 10 milliGRAM(s) Rectal daily PRN Constipation  HYDROmorphone  Injectable 0.5 milliGRAM(s) IV Push every 4 hours PRN Moderate Pain (4 - 6)  senna 2 Tablet(s) Oral at bedtime PRN Constipation    Allergies    amoxicillin (Other)    Intolerances    IV Contrast (Flushing (Skin); Nausea)          Vital Signs Last 24 Hrs  T(C): 37 (08 Jun 2018 01:46), Max: 38.1 (07 Jun 2018 17:08)  T(F): 98.6 (08 Jun 2018 01:46), Max: 100.6 (07 Jun 2018 17:08)  HR: 102 (08 Jun 2018 01:46) (79 - 102)  BP: 101/56 (08 Jun 2018 01:46) (100/67 - 132/62)  BP(mean): --  RR: 18 (08 Jun 2018 01:46) (16 - 19)  SpO2: 98% (08 Jun 2018 01:46) (96% - 98%)      PHYSICAL EXAM:  abdominal wound - dressing in place by prs  RLE  thigh dressing in place b prs  knee wound with stable fibrinous base, dressed by prs  5/5 ta/ehl/gcs  silt l4-s1  2+ dp pulse                            9.6    13.8  )-----------( 319      ( 07 Jun 2018 14:20 )             31.2     06-07    138  |  96  |  43<H>  ----------------------------<  112<H>  4.6   |  27  |  0.89    Ca    9.8      07 Jun 2018 14:20    TPro  7.6  /  Alb  2.9<L>  /  TBili  0.2  /  DBili  x   /  AST  13  /  ALT  11  /  AlkPhos  101  06-07    PT/INR - ( 07 Jun 2018 14:20 )   PT: 30.0 sec;   INR: 2.72 ratio         PTT - ( 07 Jun 2018 14:20 )  PTT:38.9 sec  Urinalysis Basic - ( 07 Jun 2018 17:15 )    Color: Yellow / Appearance: SL Turbid / SG: >1.030 / pH: x  Gluc: x / Ketone: Negative  / Bili: Negative / Urobili: Negative   Blood: x / Protein: 30 mg/dL / Nitrite: Negative   Leuk Esterase: Large / RBC: x / WBC >50 /HPF   Sq Epi: x / Non Sq Epi: OCC /HPF / Bacteria: Few /HPF      A/P:   - No need for advanced orthopedic imaging, no plan for orthopedic surgical intervention per family and patient preference  - Local wound care to knee and vac therapy to abd/thigh per plastics and wound care teams, per plastics new vac to be applied in AM  - PO abx per ID team  - encourage patient to spend majority of bed oob in bedside chair as able  - PT to help mobilize, she must remain 50% wb on the RLE and NO KNEE MOTION allowed, knee immobilizer if oob  - coumadin, inr goal 2-3  - continue to optimize nutrition  - continue to involve psych	  - plan for appendicitis per gen surg  - appears to possibly have uti now, may need abx for this as well

## 2018-06-08 NOTE — PROGRESS NOTE ADULT - SUBJECTIVE AND OBJECTIVE BOX
SUBJECTIVE:  Denies abd pain.  OBJECTIVE:  Weak, obese female. In NAD.   On exam, abdomen is only tender at the open lower midline wound. Wound has fibrinous exudate. Wounds being managed by PRS and Wound Care Team.   MEDICATIONS  (STANDING):  ALPRAZolam 0.25 milliGRAM(s) Oral once  ascorbic acid 500 milliGRAM(s) Oral daily  aspirin enteric coated 81 milliGRAM(s) Oral daily  buDESOnide 160 MICROgram(s)/formoterol 4.5 MICROgram(s) Inhaler 2 Puff(s) Inhalation two times a day  calcium carbonate 1250 mG + Vitamin D (OsCal 500 + D) 1 Tablet(s) Oral three times a day  cefepime   IVPB 2000 milliGRAM(s) IV Intermittent every 8 hours  cefepime   IVPB      folic acid 1 milliGRAM(s) Oral daily  gabapentin 200 milliGRAM(s) Oral three times a day  melatonin 3 milliGRAM(s) Oral at bedtime  metoprolol tartrate 12.5 milliGRAM(s) Oral two times a day  metroNIDAZOLE  IVPB      metroNIDAZOLE  IVPB 500 milliGRAM(s) IV Intermittent every 8 hours  multivitamin 1 Tablet(s) Oral daily  ondansetron Injectable 4 milliGRAM(s) IV Push once  pantoprazole    Tablet 40 milliGRAM(s) Oral before breakfast  polyethylene glycol 3350 17 Gram(s) Oral daily  simvastatin 20 milliGRAM(s) Oral at bedtime  tiotropium 18 MICROgram(s) Capsule 1 Capsule(s) Inhalation daily  vancomycin  IVPB      vancomycin  IVPB 1500 milliGRAM(s) IV Intermittent every 12 hours    MEDICATIONS  (PRN):  bisacodyl Suppository 10 milliGRAM(s) Rectal daily PRN Constipation  HYDROmorphone  Injectable 0.5 milliGRAM(s) IV Push every 4 hours PRN Moderate Pain (4 - 6)  senna 2 Tablet(s) Oral at bedtime PRN Constipation      Vital Signs Last 24 Hrs  T(C): 36.8 (08 Jun 2018 10:24), Max: 38.1 (07 Jun 2018 17:08)  T(F): 98.3 (08 Jun 2018 10:24), Max: 100.6 (07 Jun 2018 17:08)  HR: 70 (08 Jun 2018 10:24) (70 - 102)  BP: 113/69 (08 Jun 2018 10:24) (100/67 - 132/62)  BP(mean): --  RR: 18 (08 Jun 2018 10:24) (16 - 19)  SpO2: 95% (08 Jun 2018 10:24) (95% - 98%)      I&O's Detail    07 Jun 2018 07:01  -  08 Jun 2018 07:00  --------------------------------------------------------  IN:    Oral Fluid: 50 mL  Total IN: 50 mL    OUT:    Voided: 200 mL  Total OUT: 200 mL    Total NET: -150 mL          LABS:                        8.0    13.51 )-----------( 310      ( 08 Jun 2018 09:27 )             27.9     06-08    135  |  98  |  32<H>  ----------------------------<  93  4.2   |  27  |  0.88    Ca    9.0      08 Jun 2018 06:59  Mg     1.8     06-08    TPro  6.5  /  Alb  2.3<L>  /  TBili  0.6  /  DBili  x   /  AST  15  /  ALT  11  /  AlkPhos  93  06-08    PT/INR - ( 08 Jun 2018 09:19 )   PT: 28.3 sec;   INR: 2.46 ratio         PTT - ( 08 Jun 2018 09:19 )  PTT:41.2 sec  Urinalysis Basic - ( 07 Jun 2018 17:15 )    Color: Yellow / Appearance: SL Turbid / SG: >1.030 / pH: x  Gluc: x / Ketone: Negative  / Bili: Negative / Urobili: Negative   Blood: x / Protein: 30 mg/dL / Nitrite: Negative   Leuk Esterase: Large / RBC: x / WBC >50 /HPF   Sq Epi: x / Non Sq Epi: OCC /HPF / Bacteria: Few /HPF        RADIOLOGY & ADDITIONAL STUDIES:    < from: CT Abdomen and Pelvis w/ IV Cont (06.07.18 @ 15:06) >  EXAM:  CT ABDOMEN AND PELVIS IC                          EXAM:  CT CHEST IC                          PROCEDURE DATE:  06/07/2018      INTERPRETATION:  CLINICAL INFORMATION: Shortness of breath and nausea.    COMPARISON: 4/19/2018 and 4/9/2018.     PROCEDURE:   CT of the Chest, Abdomen and Pelvis was performed with intravenous   contrast.   Intravenous contrast: 90 ml Omnipaque 350. 10 ml discarded.  Oral contrast: None.  Sagittal and coronal reformats were performed.    FINDINGS:    CHEST:     LUNGS AND LARGE AIRWAYS: Patent central airways. Clear lungs.  PLEURA: No pleural effusion.       VESSELS: Post surgical changes in the ascending aorta. Mild   calcifications of thoracic aorta.  MEDIASTINUM: Heart size is normal. No pericardial effusion. No   mediastinal, hilar, axillary or supraclavicular lymphadenopathy. ICD   leads are noted.  CHEST WALL AND LOWER NECK: Within normal limits.        ABDOMEN AND PELVIS:    LIVER: No focal lesion.      BILE DUCTS: Normal caliber.  GALLBLADDER: Surgically absent.  SPLEEN: Multiple indeterminate low-attenuation lesions, unchanged since   12/12/2016.  PANCREAS: Unremarkable.  ADRENALS: Unremarkable.  KIDNEYS/URETERS: No hydronephrosis.  Left renal cyst in the upper pole. A   complex enhancing lesion in the lower pole of left kidney, containing   minimal fat, unchanged since 12/12/2016.    BLADDER: Within normal limits.  REPRODUCTIVE ORGANS: Hysterectomy. No adnexal mass.    BOWEL: Normal in course and caliber without obstruction. The appendiceal   tip is distended with fluid measuring up to 10 mm with adjacent fatty   stranding.  PERITONEUM/RETROPERITONEUM: No pneumoperitoneum, ascites, or   lymphadenopathy.  VESSELS:  No evidence of aortic aneurysm. Moderate vascular   calcifications. Infrarenal IVC filter.  BONES/SOFT TISSUES: Changes of subcutaneous injections in the anterior   abdominal wall. (Skin defect in the lower anterior abdominal wall without   associated collection..      IMPRESSION:   Findings most suggestive of tip appendicitis.  Skin defect of the lower anterior abdominal wall out associated fluid   collection.  No acute abnormality within the chest.    Findings discussed with DR. VAZQUEZ in the ER on 6/7/2018 5:40 PM with   read back.    LISA ORTEGA M.D. ATTENDING RADIOLOGIST  This document has been electronically signed. Jun 7 2018  5:41PM        < end of copied text >

## 2018-06-08 NOTE — PHYSICAL THERAPY INITIAL EVALUATION ADULT - IMPAIRMENTS FOUND, PT EVAL
integumentary integrity integumentary integrity/gait, locomotion, and balance/aerobic capacity/endurance/muscle strength

## 2018-06-08 NOTE — PROGRESS NOTE ADULT - ASSESSMENT
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    # Fever  Patient recently discharged from rehab after being admitted for long term IV antibiotics due to infected R knee prothesis s/p spacer change. Completed course of Dapto and was on suppressive minocycline. gets readmitted a day later of discharge from [facility]   CT scan with possible distal appendicitis -> Cefepime/Flagyl   UA positive -  History of MRSA joint infections -> Vancomycin   Multiple wounds (though not infected as per Plastics) -  PICC line intact clean-no e/o infection  f/u blood cultures, urine cultures pending  - Tylenol prn fevers  - ID consulf/u   Follow up surgery, ortho, and plastics recs      # Acute appendicitis, unspecified acute appendicitis type.     Patient with appendicitis on CT scan. Non operative as per Surgery after discussion with family  - Surgery consulted, appreciate recs  - Continue Cefepime/Flagyl  - Follow up cultures.     # Wound infection.  Plan: Patient has multiple wounds on R thigh as well as mid-abdomen. Seen by plastic surgery and not felt to be infected. Wound vac removed yesterday, will plan to be replaced in AM.  As per Plastics:  - continue vac to abdomen  - continue adaptic and vac to thigh  - if admitted, vacs are managed by PT (Yvonne).,vac changes M/W/F.  - continue daily wet-to-dry dressing over open portion of knee wound  -consult wound care (Dr. Joseph) for any other wounds.     #Joint infection.  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  - Start Vancomycin for known MRSA  - Hold minocycline  - ID consult f/u- Dr Mace grp  - Ortho following, appreciate recs.     # Acute cystitis without hematuria.  Plan: - Positive UA, fever  - Continue Cefepime, follow up UCx pending  - ID consult in AM.     # S/P TAVR (transcatheter aortic valve replacement). Plan: - continue Coumadin, daily INR.

## 2018-06-08 NOTE — PROGRESS NOTE ADULT - SUBJECTIVE AND OBJECTIVE BOX
Plastic Surgery Progress Note (pg [facility]: [id number], [facility]: [phone])    SUBJECTIVE:  The patient was seen and examined this morning during rounds. No acute events overnight. Admitted for medical management of appendicitis.    OBJECTIVE:     ** VITAL SIGNS / I&O's **    Vital Signs Last 24 Hrs  T(C): 36.7 (08 Jun 2018 06:30), Max: 38.1 (07 Jun 2018 17:08)  T(F): 98 (08 Jun 2018 06:30), Max: 100.6 (07 Jun 2018 17:08)  HR: 76 (08 Jun 2018 06:30) (76 - 102)  BP: 122/65 (08 Jun 2018 06:30) (100/67 - 132/62)  BP(mean): --  RR: 18 (08 Jun 2018 06:30) (16 - 19)  SpO2: 98% (08 Jun 2018 06:30) (96% - 98%)      07 Jun 2018 07:01  -  08 Jun 2018 07:00  --------------------------------------------------------  IN:    Oral Fluid: 50 mL  Total IN: 50 mL    OUT:    Voided: 200 mL  Total OUT: 200 mL    Total NET: -150 mL          ** PHYSICAL EXAM **    -- CONSTITUTIONAL: Alert, NAD, lying in bed  -- EXTREMITIES: R knee wound with fibrinous tissue at base, dressed with moist gauze and ABD, no sign of infection    ** LABS **                          9.6    13.8  )-----------( 319      ( 07 Jun 2018 14:20 )             31.2     08 Jun 2018 06:59    135    |  98     |  32     ----------------------------<  93     4.2     |  27     |  0.88     Ca    9.0        08 Jun 2018 06:59  Mg     1.8       08 Jun 2018 06:59    TPro  6.5    /  Alb  2.3    /  TBili  0.6    /  DBili  x      /  AST  15     /  ALT  11     /  AlkPhos  93     08 Jun 2018 06:59    PT/INR - ( 07 Jun 2018 14:20 )   PT: 30.0 sec;   INR: 2.72 ratio         PTT - ( 07 Jun 2018 14:20 )  PTT:38.9 sec  CAPILLARY BLOOD GLUCOSE              Urinalysis Basic - ( 07 Jun 2018 17:15 )    Color: Yellow / Appearance: SL Turbid / SG: >1.030 / pH: x  Gluc: x / Ketone: Negative  / Bili: Negative / Urobili: Negative   Blood: x / Protein: 30 mg/dL / Nitrite: Negative   Leuk Esterase: Large / RBC: x / WBC >50 /HPF   Sq Epi: x / Non Sq Epi: OCC /HPF / Bacteria: Few /HPF          ** MEDICATIONS **  MEDICATIONS  (STANDING):  ascorbic acid 500 milliGRAM(s) Oral daily  aspirin enteric coated 81 milliGRAM(s) Oral daily  buDESOnide 160 MICROgram(s)/formoterol 4.5 MICROgram(s) Inhaler 2 Puff(s) Inhalation two times a day  calcium carbonate 1250 mG + Vitamin D (OsCal 500 + D) 1 Tablet(s) Oral three times a day  cefepime   IVPB 2000 milliGRAM(s) IV Intermittent every 8 hours  cefepime   IVPB      folic acid 1 milliGRAM(s) Oral daily  gabapentin 200 milliGRAM(s) Oral three times a day  melatonin 3 milliGRAM(s) Oral at bedtime  metoprolol tartrate 12.5 milliGRAM(s) Oral two times a day  metroNIDAZOLE  IVPB      metroNIDAZOLE  IVPB 500 milliGRAM(s) IV Intermittent every 8 hours  multivitamin 1 Tablet(s) Oral daily  pantoprazole    Tablet 40 milliGRAM(s) Oral before breakfast  polyethylene glycol 3350 17 Gram(s) Oral daily  simvastatin 20 milliGRAM(s) Oral at bedtime  tiotropium 18 MICROgram(s) Capsule 1 Capsule(s) Inhalation daily  vancomycin  IVPB      vancomycin  IVPB 1500 milliGRAM(s) IV Intermittent every 12 hours    MEDICATIONS  (PRN):  bisacodyl Suppository 10 milliGRAM(s) Rectal daily PRN Constipation  HYDROmorphone  Injectable 0.5 milliGRAM(s) IV Push every 4 hours PRN Moderate Pain (4 - 6)  senna 2 Tablet(s) Oral at bedtime PRN Constipation

## 2018-06-08 NOTE — CONSULT NOTE ADULT - PROBLEM SELECTOR RECOMMENDATION 9
Patient doing quite well with the wound changes using the intravenous Dilaudid.  Listening to the patient's history 2 mg of oral Dilaudid given every 3 hours in between the changes was very effective recently for the care of the ongoing pain.  I suggest returning the patient that regiment that was successful for ongoing care.

## 2018-06-08 NOTE — PHYSICAL THERAPY INITIAL EVALUATION ADULT - ACTIVE RANGE OF MOTION EXAMINATION, REHAB EVAL
bilateral upper extremity Active ROM was WFL (within functional limits)/RLE n/t/Left LE Active ROM was WFL (within functional limits)

## 2018-06-08 NOTE — PROGRESS NOTE ADULT - ATTENDING COMMENTS
Pt with possible tip appendicitis on CT. Not toxic and with multiple medical/wound issues. Very poor surgical candidate.  Recommend continuing IV ABX for management of possible tip appendicitis.  D/w pt and family at the bedside.

## 2018-06-08 NOTE — PHYSICAL THERAPY INITIAL EVALUATION ADULT - ADDITIONAL COMMENTS
as per pt and aide, pt has been in/out hospital multiple times d/t infection of R knee, since January-2018, pt has been not able to amb, hoyal in/out chair to/from bed, assist with all functional mobility    prior to Januar 2018, pt resides in an elevated APT with spouse, no DEV, +HHA.

## 2018-06-08 NOTE — PROGRESS NOTE ADULT - SUBJECTIVE AND OBJECTIVE BOX
Patient is a 77y old  Female who presents with a chief complaint of fever, lethargy (07 Jun 2018 22:19)      INTERVAL HPI/OVERNIGHT EVENTS:      Vital Signs Last 24 Hrs  T(C): 36.8 (08 Jun 2018 14:32), Max: 38.1 (07 Jun 2018 17:08)  T(F): 98.3 (08 Jun 2018 14:32), Max: 100.6 (07 Jun 2018 17:08)  HR: 77 (08 Jun 2018 14:32) (70 - 102)  BP: 123/67 (08 Jun 2018 14:32) (100/67 - 123/67)  BP(mean): --  RR: 18 (08 Jun 2018 14:32) (16 - 18)  SpO2: 96% (08 Jun 2018 14:32) (95% - 98%)    acetaminophen   Tablet. 650 milliGRAM(s) Oral every 6 hours PRN  ALPRAZolam 0.25 milliGRAM(s) Oral once  ascorbic acid 500 milliGRAM(s) Oral daily  aspirin enteric coated 81 milliGRAM(s) Oral daily  bisacodyl Suppository 10 milliGRAM(s) Rectal daily PRN  buDESOnide 160 MICROgram(s)/formoterol 4.5 MICROgram(s) Inhaler 2 Puff(s) Inhalation two times a day  calcium carbonate 1250 mG + Vitamin D (OsCal 500 + D) 1 Tablet(s) Oral three times a day  folic acid 1 milliGRAM(s) Oral daily  gabapentin 200 milliGRAM(s) Oral three times a day  HYDROmorphone  Injectable 0.5 milliGRAM(s) IV Push every 4 hours PRN  melatonin 3 milliGRAM(s) Oral at bedtime  metoprolol tartrate 12.5 milliGRAM(s) Oral two times a day  multivitamin 1 Tablet(s) Oral daily  ondansetron Injectable 4 milliGRAM(s) IV Push once  pantoprazole    Tablet 40 milliGRAM(s) Oral before breakfast  polyethylene glycol 3350 17 Gram(s) Oral daily  senna 2 Tablet(s) Oral at bedtime PRN  simvastatin 20 milliGRAM(s) Oral at bedtime  tiotropium 18 MICROgram(s) Capsule 1 Capsule(s) Inhalation daily      PHYSICAL EXAM:  GENERAL: NAD,   EYES: conjunctiva and sclera clear  ENMT: Moist mucous membranes  NECK: Supple, No JVD, Normal thyroid  NERVOUS SYSTEM:  Alert & Oriented X3,   CHEST/LUNG: Clear to auscultation bilaterally; No rales, rhonchi, wheezing, or rubs  HEART: Regular rate and rhythm; No murmurs, rubs, or gallops  ABDOMEN: Soft, mild rt sided tenderness  EXTREMITIES:  Rt prox thigh wound vac, rt knee dressing  LYMPH: No lymphadenopathy noted  SKIN: No rashes or lesions    Consultant(s) Notes Reviewed:  [x ] YES  [ ] NO  Care Discussed with Consultants/Other Providers [ x] YES  [ ] NO    LABS:                        8.0    13.51 )-----------( 310      ( 08 Jun 2018 09:27 )             27.9     06-08    135  |  98  |  32<H>  ----------------------------<  93  4.2   |  27  |  0.88    Ca    9.0      08 Jun 2018 06:59  Mg     1.8     06-08    TPro  6.5  /  Alb  2.3<L>  /  TBili  0.6  /  DBili  x   /  AST  15  /  ALT  11  /  AlkPhos  93  06-08    PT/INR - ( 08 Jun 2018 09:19 )   PT: 28.3 sec;   INR: 2.46 ratio         PTT - ( 08 Jun 2018 09:19 )  PTT:41.2 sec  Urinalysis Basic - ( 07 Jun 2018 17:15 )    Color: Yellow / Appearance: SL Turbid / SG: >1.030 / pH: x  Gluc: x / Ketone: Negative  / Bili: Negative / Urobili: Negative   Blood: x / Protein: 30 mg/dL / Nitrite: Negative   Leuk Esterase: Large / RBC: x / WBC >50 /HPF   Sq Epi: x / Non Sq Epi: OCC /HPF / Bacteria: Few /HPF      CAPILLARY BLOOD GLUCOSE            Urinalysis Basic - ( 07 Jun 2018 17:15 )    Color: Yellow / Appearance: SL Turbid / SG: >1.030 / pH: x  Gluc: x / Ketone: Negative  / Bili: Negative / Urobili: Negative   Blood: x / Protein: 30 mg/dL / Nitrite: Negative   Leuk Esterase: Large / RBC: x / WBC >50 /HPF   Sq Epi: x / Non Sq Epi: OCC /HPF / Bacteria: Few /HPF        RADIOLOGY & ADDITIONAL TESTS:< from: Ct a/p /CT Chest w/ IV Cont (06.07.18 @ 15:06) >  Findings most suggestive of tip appendicitis.  Skin defect of the lower anterior abdominal wall out associated fluid   collection.  No acute abnormality within the chest.    < end of copied text >      Imaging Personally Reviewed:  [x ] YES  [ ] NO

## 2018-06-08 NOTE — CONSULT NOTE ADULT - ASSESSMENT
77 year old female s/p rudy from right knee and spacer for strept infection then returned with nonhealing wound and had spacer change, fusion ruthann, plastic closure and grew mrsa.   Wound developed necrotic skin, then pneumonia, and hematoma over the right thigh with necrosis of skin too.   Finished full course of iv abx, on suppressive minocycline for the knee. Pneumonia treated.  Debrided thigh and abdomen wound with VAC.  Dermatology performed 4mm punch bx performed of R thigh- cx grew enterobacter and morganella, suggestive of colonizing roopa in a patient with prolonged hospital stay,   Path as reviewed above- inflammatory infiltrate, no features of pyoderma in sample.Appreciate dermatology f/u, no support for PG at this point.  She has been stable the past few weeks.  She is now readmitted after one day for nausea and dx on CT A/P with tip appendicitis, she was seem by the surgical service who report that the patient is a poor surgical candidate and it to be treated medically with antibiotics, Wound care management as per plastics     Plan:   Continue suppressive minocycline 100  mg po bid   Consolidate  ABX to Merrem 1 g IV q8 for appendicitis, dc vanco, cefepime and flagyl

## 2018-06-08 NOTE — PHYSICAL THERAPY INITIAL EVALUATION ADULT - PERTINENT HX OF CURRENT PROBLEM, REHAB EVAL
Patient was recently admitted from 4/9/18 - 6/6/18. Patient was initially presented for right total knee spacer exchange and I&D with complex wound closure. Previously had a spacer exchanged performed on 3/9 and then was discharged to rehab. Patient then returned on 4/9 with fever and concern for sepsis, found to have positive cultures from knee for MRSA as well as multifocal pneumonia.

## 2018-06-09 LAB
ANION GAP SERPL CALC-SCNC: 11 MMOL/L — SIGNIFICANT CHANGE UP (ref 5–17)
BASOPHILS # BLD AUTO: 0.02 K/UL — SIGNIFICANT CHANGE UP (ref 0–0.2)
BASOPHILS NFR BLD AUTO: 0.2 % — SIGNIFICANT CHANGE UP (ref 0–2)
BUN SERPL-MCNC: 29 MG/DL — HIGH (ref 7–23)
CALCIUM SERPL-MCNC: 9.4 MG/DL — SIGNIFICANT CHANGE UP (ref 8.4–10.5)
CHLORIDE SERPL-SCNC: 99 MMOL/L — SIGNIFICANT CHANGE UP (ref 96–108)
CO2 SERPL-SCNC: 28 MMOL/L — SIGNIFICANT CHANGE UP (ref 22–31)
CREAT SERPL-MCNC: 0.8 MG/DL — SIGNIFICANT CHANGE UP (ref 0.5–1.3)
EOSINOPHIL # BLD AUTO: 0.54 K/UL — HIGH (ref 0–0.5)
EOSINOPHIL NFR BLD AUTO: 5.7 % — SIGNIFICANT CHANGE UP (ref 0–6)
GLUCOSE SERPL-MCNC: 101 MG/DL — HIGH (ref 70–99)
HCT VFR BLD CALC: 26.3 % — LOW (ref 34.5–45)
HGB BLD-MCNC: 7.8 G/DL — LOW (ref 11.5–15.5)
IMM GRANULOCYTES NFR BLD AUTO: 0.3 % — SIGNIFICANT CHANGE UP (ref 0–1.5)
INR BLD: 2.8 RATIO — HIGH (ref 0.88–1.16)
LYMPHOCYTES # BLD AUTO: 1.58 K/UL — SIGNIFICANT CHANGE UP (ref 1–3.3)
LYMPHOCYTES # BLD AUTO: 16.8 % — SIGNIFICANT CHANGE UP (ref 13–44)
MCHC RBC-ENTMCNC: 27 PG — SIGNIFICANT CHANGE UP (ref 27–34)
MCHC RBC-ENTMCNC: 29.7 GM/DL — LOW (ref 32–36)
MCV RBC AUTO: 91 FL — SIGNIFICANT CHANGE UP (ref 80–100)
MONOCYTES # BLD AUTO: 0.53 K/UL — SIGNIFICANT CHANGE UP (ref 0–0.9)
MONOCYTES NFR BLD AUTO: 5.6 % — SIGNIFICANT CHANGE UP (ref 2–14)
NEUTROPHILS # BLD AUTO: 6.73 K/UL — SIGNIFICANT CHANGE UP (ref 1.8–7.4)
NEUTROPHILS NFR BLD AUTO: 71.4 % — SIGNIFICANT CHANGE UP (ref 43–77)
PLATELET # BLD AUTO: 246 K/UL — SIGNIFICANT CHANGE UP (ref 150–400)
POTASSIUM SERPL-MCNC: 3.9 MMOL/L — SIGNIFICANT CHANGE UP (ref 3.5–5.3)
POTASSIUM SERPL-SCNC: 3.9 MMOL/L — SIGNIFICANT CHANGE UP (ref 3.5–5.3)
PROTHROM AB SERPL-ACNC: 32.3 SEC — HIGH (ref 10–13.1)
RBC # BLD: 2.89 M/UL — LOW (ref 3.8–5.2)
RBC # FLD: 18.7 % — HIGH (ref 10.3–14.5)
SODIUM SERPL-SCNC: 138 MMOL/L — SIGNIFICANT CHANGE UP (ref 135–145)
WBC # BLD: 9.43 K/UL — SIGNIFICANT CHANGE UP (ref 3.8–10.5)
WBC # FLD AUTO: 9.43 K/UL — SIGNIFICANT CHANGE UP (ref 3.8–10.5)

## 2018-06-09 RX ORDER — WARFARIN SODIUM 2.5 MG/1
0.5 TABLET ORAL ONCE
Qty: 0 | Refills: 0 | Status: COMPLETED | OUTPATIENT
Start: 2018-06-09 | End: 2018-06-09

## 2018-06-09 RX ADMIN — TIOTROPIUM BROMIDE 1 CAPSULE(S): 18 CAPSULE ORAL; RESPIRATORY (INHALATION) at 12:07

## 2018-06-09 RX ADMIN — MEROPENEM 100 MILLIGRAM(S): 1 INJECTION INTRAVENOUS at 10:13

## 2018-06-09 RX ADMIN — MINOCYCLINE HYDROCHLORIDE 100 MILLIGRAM(S): 45 TABLET, EXTENDED RELEASE ORAL at 17:27

## 2018-06-09 RX ADMIN — SIMVASTATIN 20 MILLIGRAM(S): 20 TABLET, FILM COATED ORAL at 22:29

## 2018-06-09 RX ADMIN — MINOCYCLINE HYDROCHLORIDE 100 MILLIGRAM(S): 45 TABLET, EXTENDED RELEASE ORAL at 06:00

## 2018-06-09 RX ADMIN — MEROPENEM 100 MILLIGRAM(S): 1 INJECTION INTRAVENOUS at 17:28

## 2018-06-09 RX ADMIN — Medication 3 MILLIGRAM(S): at 22:30

## 2018-06-09 RX ADMIN — Medication 1 MILLIGRAM(S): at 12:07

## 2018-06-09 RX ADMIN — GABAPENTIN 200 MILLIGRAM(S): 400 CAPSULE ORAL at 13:31

## 2018-06-09 RX ADMIN — GABAPENTIN 200 MILLIGRAM(S): 400 CAPSULE ORAL at 22:29

## 2018-06-09 RX ADMIN — Medication 650 MILLIGRAM(S): at 17:57

## 2018-06-09 RX ADMIN — WARFARIN SODIUM 0.5 MILLIGRAM(S): 2.5 TABLET ORAL at 22:29

## 2018-06-09 RX ADMIN — Medication 1 TABLET(S): at 12:07

## 2018-06-09 RX ADMIN — PANTOPRAZOLE SODIUM 40 MILLIGRAM(S): 20 TABLET, DELAYED RELEASE ORAL at 05:59

## 2018-06-09 RX ADMIN — HYDROMORPHONE HYDROCHLORIDE 0.5 MILLIGRAM(S): 2 INJECTION INTRAMUSCULAR; INTRAVENOUS; SUBCUTANEOUS at 12:25

## 2018-06-09 RX ADMIN — BUDESONIDE AND FORMOTEROL FUMARATE DIHYDRATE 2 PUFF(S): 160; 4.5 AEROSOL RESPIRATORY (INHALATION) at 05:59

## 2018-06-09 RX ADMIN — Medication 500 MILLIGRAM(S): at 12:09

## 2018-06-09 RX ADMIN — Medication 650 MILLIGRAM(S): at 18:30

## 2018-06-09 RX ADMIN — Medication 12.5 MILLIGRAM(S): at 05:59

## 2018-06-09 RX ADMIN — Medication 1 TABLET(S): at 22:29

## 2018-06-09 RX ADMIN — HYDROMORPHONE HYDROCHLORIDE 0.5 MILLIGRAM(S): 2 INJECTION INTRAMUSCULAR; INTRAVENOUS; SUBCUTANEOUS at 06:55

## 2018-06-09 RX ADMIN — GABAPENTIN 200 MILLIGRAM(S): 400 CAPSULE ORAL at 06:00

## 2018-06-09 RX ADMIN — HYDROMORPHONE HYDROCHLORIDE 0.5 MILLIGRAM(S): 2 INJECTION INTRAMUSCULAR; INTRAVENOUS; SUBCUTANEOUS at 12:07

## 2018-06-09 RX ADMIN — MEROPENEM 100 MILLIGRAM(S): 1 INJECTION INTRAVENOUS at 01:03

## 2018-06-09 RX ADMIN — HYDROMORPHONE HYDROCHLORIDE 0.5 MILLIGRAM(S): 2 INJECTION INTRAMUSCULAR; INTRAVENOUS; SUBCUTANEOUS at 07:30

## 2018-06-09 RX ADMIN — Medication 12.5 MILLIGRAM(S): at 17:28

## 2018-06-09 RX ADMIN — Medication 81 MILLIGRAM(S): at 12:07

## 2018-06-09 RX ADMIN — Medication 1 TABLET(S): at 13:31

## 2018-06-09 RX ADMIN — Medication 1 TABLET(S): at 06:00

## 2018-06-09 RX ADMIN — BUDESONIDE AND FORMOTEROL FUMARATE DIHYDRATE 2 PUFF(S): 160; 4.5 AEROSOL RESPIRATORY (INHALATION) at 17:28

## 2018-06-09 NOTE — PROGRESS NOTE ADULT - SUBJECTIVE AND OBJECTIVE BOX
Patient is a 77y old  Female who presents with a chief complaint of fever, lethargy (07 Jun 2018 22:19)      INTERVAL HPI/OVERNIGHT EVENTS: noted, pain controlled with meds, denies any abd pain,n/v   toelrating po well      Vital Signs Last 24 Hrs  T(C): 36.9 (09 Jun 2018 10:07), Max: 37.1 (09 Jun 2018 05:33)  T(F): 98.5 (09 Jun 2018 10:07), Max: 98.7 (09 Jun 2018 05:33)  HR: 66 (09 Jun 2018 10:07) (66 - 102)  BP: 108/66 (09 Jun 2018 10:07) (104/59 - 130/74)  BP(mean): --  RR: 18 (09 Jun 2018 10:07) (18 - 18)  SpO2: 98% (09 Jun 2018 10:07) (94% - 100%)    acetaminophen   Tablet. 650 milliGRAM(s) Oral every 6 hours PRN  ALPRAZolam 0.25 milliGRAM(s) Oral daily PRN  ALPRAZolam 0.25 milliGRAM(s) Oral once  ascorbic acid 500 milliGRAM(s) Oral daily  aspirin enteric coated 81 milliGRAM(s) Oral daily  bisacodyl Suppository 10 milliGRAM(s) Rectal daily PRN  buDESOnide 160 MICROgram(s)/formoterol 4.5 MICROgram(s) Inhaler 2 Puff(s) Inhalation two times a day  calcium carbonate 1250 mG + Vitamin D (OsCal 500 + D) 1 Tablet(s) Oral three times a day  folic acid 1 milliGRAM(s) Oral daily  gabapentin 200 milliGRAM(s) Oral three times a day  HYDROmorphone  Injectable 0.5 milliGRAM(s) IV Push every 4 hours PRN  melatonin 3 milliGRAM(s) Oral at bedtime  meropenem  IVPB 1000 milliGRAM(s) IV Intermittent every 8 hours  metoprolol tartrate 12.5 milliGRAM(s) Oral two times a day  minocycline 100 milliGRAM(s) Oral two times a day  multivitamin 1 Tablet(s) Oral daily  ondansetron Injectable 4 milliGRAM(s) IV Push once  pantoprazole    Tablet 40 milliGRAM(s) Oral before breakfast  polyethylene glycol 3350 17 Gram(s) Oral daily  senna 2 Tablet(s) Oral at bedtime PRN  simvastatin 20 milliGRAM(s) Oral at bedtime  tiotropium 18 MICROgram(s) Capsule 1 Capsule(s) Inhalation daily      PHYSICAL EXAM:  GENERAL: NAD,   EYES: conjunctiva and sclera clear  ENMT: Moist mucous membranes  NECK: Supple, No JVD, Normal thyroid  NERVOUS SYSTEM:  Alert & Oriented X3,   CHEST/LUNG: Clear to auscultation bilaterally; No rales, rhonchi, wheezing, or rubs  HEART: Regular rate and rhythm; No murmurs, rubs, or gallops  ABDOMEN: Soft, Nontender, Nondistended; Bowel sounds present, wound vac  EXTREMITIES:  rt prox thigh wound vac, dressing of ext wounds  LYMPH: No lymphadenopathy noted  SKIN: No rashes or lesions    Consultant(s) Notes Reviewed:  [x ] YES  [ ] NO  Care Discussed with Consultants/Other Providers [ x] YES  [ ] NO    LABS:                        7.8    9.43  )-----------( 246      ( 09 Jun 2018 08:41 )             26.3     06-09    138  |  99  |  29<H>  ----------------------------<  101<H>  3.9   |  28  |  0.80    Ca    9.4      09 Jun 2018 05:59  Mg     1.8     06-08    TPro  6.5  /  Alb  2.3<L>  /  TBili  0.6  /  DBili  x   /  AST  15  /  ALT  11  /  AlkPhos  93  06-08    PT/INR - ( 09 Jun 2018 08:41 )   PT: 32.3 sec;   INR: 2.80 ratio         PTT - ( 08 Jun 2018 09:19 )  PTT:41.2 sec  Urinalysis Basic - ( 07 Jun 2018 17:15 )    Color: Yellow / Appearance: SL Turbid / SG: >1.030 / pH: x  Gluc: x / Ketone: Negative  / Bili: Negative / Urobili: Negative   Blood: x / Protein: 30 mg/dL / Nitrite: Negative   Leuk Esterase: Large / RBC: x / WBC >50 /HPF   Sq Epi: x / Non Sq Epi: OCC /HPF / Bacteria: Few /HPF      CAPILLARY BLOOD GLUCOSE            Urinalysis Basic - ( 07 Jun 2018 17:15 )    Color: Yellow / Appearance: SL Turbid / SG: >1.030 / pH: x  Gluc: x / Ketone: Negative  / Bili: Negative / Urobili: Negative   Blood: x / Protein: 30 mg/dL / Nitrite: Negative   Leuk Esterase: Large / RBC: x / WBC >50 /HPF   Sq Epi: x / Non Sq Epi: OCC /HPF / Bacteria: Few /HPF        RADIOLOGY & ADDITIONAL TESTS:    Imaging Personally Reviewed:  [x ] YES  [ ] NO

## 2018-06-09 NOTE — PROGRESS NOTE ADULT - ASSESSMENT
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    Non operative as per Surgery after discussion with family  ID c/s appreciated  f/u sx recs    # Wound infection.  Plan: Patient has multiple wounds on R thigh as well as mid-abdomen. Seen by plastic surgery and not felt to be infected.   - continue vac to abdomen, continue adaptic and vac to thigh  - vacs are managed by PT Jayro).,vac changes M/W/F.  - continue daily wet-to-dry dressing over open portion of knee wound  -consult wound care (Dr. Joseph) for any other wounds.   Follow up surgery, ortho, and plastics recs    #[facility] line care    #Joint infection.  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    # S/P TAVR (transcatheter aortic valve replacement). Plan: - continue Coumadin, daily INR.

## 2018-06-09 NOTE — PROGRESS NOTE ADULT - SUBJECTIVE AND OBJECTIVE BOX
CC: f/u for appendicitis     Patient reports feeling OK in bed, no fevers     REVIEW OF SYSTEMS:  All other review of systems negative (Comprehensive ROS)      T(F): 98.5 (06-09-18 @ 10:07), Max: 98.7 (06-09-18 @ 05:33)  HR: 66 (06-09-18 @ 10:07)  BP: 108/66 (06-09-18 @ 10:07)  RR: 18 (06-09-18 @ 10:07)  SpO2: 98% (06-09-18 @ 10:07)  Wt(kg): --    PHYSICAL EXAM:  General: alert, no acute distress  Eyes:  anicteric, no conjunctival injection, no discharge  Oropharynx: no lesions or injection 	  Neck: supple, without adenopathy  Lungs: clear to auscultation  Heart: regular rate and rhythm; no murmur, rubs or gallops  Abdomen: soft, nondistended, nontender, without mass or organomegaly Vac in place   Skin: no lesions  Extremities: no clubbing, cyanosis, or edema  Neurologic: alert, oriented, moves all extremities    LAB RESULTS:                        7.8    9.43  )-----------( 246      ( 09 Jun 2018 08:41 )             26.3     06-09    138  |  99  |  29<H>  ----------------------------<  101<H>  3.9   |  28  |  0.80    Ca    9.4      09 Jun 2018 05:59  Mg     1.8     06-08    TPro  6.5  /  Alb  2.3<L>  /  TBili  0.6  /  DBili  x   /  AST  15  /  ALT  11  /  AlkPhos  93  06-08    LIVER FUNCTIONS - ( 08 Jun 2018 06:59 )  Alb: 2.3 g/dL / Pro: 6.5 g/dL / ALK PHOS: 93 U/L / ALT: 11 U/L / AST: 15 U/L / GGT: x           Urinalysis Basic - ( 07 Jun 2018 17:15 )    Color: Yellow / Appearance: SL Turbid / SG: >1.030 / pH: x  Gluc: x / Ketone: Negative  / Bili: Negative / Urobili: Negative   Blood: x / Protein: 30 mg/dL / Nitrite: Negative   Leuk Esterase: Large / RBC: x / WBC >50 /HPF   Sq Epi: x / Non Sq Epi: OCC /HPF / Bacteria: Few /HPF      MICROBIOLOGY:  RECENT CULTURES:  06-07 @ 19:48 .Blood Blood     No growth to date.      06-07 @ 18:40 .Urine Catheterized     No growth      06-07 @ 16:57 .Blood Blood     No growth to date.          RADIOLOGY REVIEWED:        Antimicrobials Day #    meropenem  IVPB 1000 milliGRAM(s) IV Intermittent every 8 hours  minocycline 100 milliGRAM(s) Oral two times a day    Other Medications Reviewed

## 2018-06-09 NOTE — PROGRESS NOTE ADULT - ASSESSMENT
77 year old female s/p rudy from right knee and spacer for strept infection then returned with nonhealing wound and had spacer change, fusion ruthann, plastic closure and grew mrsa.   Wound developed necrotic skin, then pneumonia, and hematoma over the right thigh with necrosis of skin too.   Finished full course of iv abx, on suppressive minocycline for the knee. Pneumonia treated.  Debrided thigh and abdomen wound with VAC.  Dermatology performed 4mm punch bx performed of R thigh- cx grew enterobacter and morganella, suggestive of colonizing roopa in a patient with prolonged hospital stay,   Path as reviewed above- inflammatory infiltrate, no features of pyoderma in sample.Appreciate dermatology f/u, no support for PG at this point.  She has been stable the past few weeks.  She is now readmitted after one day for nausea and dx on CT A/P with tip appendicitis, she was seem by the surgical service who report that the patient is a poor surgical candidate and it to be treated medically with antibiotics, Wound care management as per plastics   reviewed noted from surgery continue with medical management     Plan:   Continue suppressive minocycline 100  mg po bid   Continue  ABX to Merrem 1 g IV q8 for appendicitis, DAY 2

## 2018-06-10 LAB
INR BLD: 2.23 RATIO — HIGH (ref 0.88–1.16)
PROTHROM AB SERPL-ACNC: 25.6 SEC — HIGH (ref 10–13.1)

## 2018-06-10 RX ORDER — HYDROMORPHONE HYDROCHLORIDE 2 MG/ML
0.5 INJECTION INTRAMUSCULAR; INTRAVENOUS; SUBCUTANEOUS EVERY 4 HOURS
Qty: 0 | Refills: 0 | Status: DISCONTINUED | OUTPATIENT
Start: 2018-06-10 | End: 2018-06-17

## 2018-06-10 RX ORDER — HYDROMORPHONE HYDROCHLORIDE 2 MG/ML
2 INJECTION INTRAMUSCULAR; INTRAVENOUS; SUBCUTANEOUS EVERY 4 HOURS
Qty: 0 | Refills: 0 | Status: DISCONTINUED | OUTPATIENT
Start: 2018-06-10 | End: 2018-06-11

## 2018-06-10 RX ORDER — WARFARIN SODIUM 2.5 MG/1
0.5 TABLET ORAL ONCE
Qty: 0 | Refills: 0 | Status: COMPLETED | OUTPATIENT
Start: 2018-06-10 | End: 2018-06-10

## 2018-06-10 RX ADMIN — MEROPENEM 100 MILLIGRAM(S): 1 INJECTION INTRAVENOUS at 17:55

## 2018-06-10 RX ADMIN — Medication 12.5 MILLIGRAM(S): at 17:55

## 2018-06-10 RX ADMIN — HYDROMORPHONE HYDROCHLORIDE 2 MILLIGRAM(S): 2 INJECTION INTRAMUSCULAR; INTRAVENOUS; SUBCUTANEOUS at 17:00

## 2018-06-10 RX ADMIN — GABAPENTIN 200 MILLIGRAM(S): 400 CAPSULE ORAL at 05:01

## 2018-06-10 RX ADMIN — MINOCYCLINE HYDROCHLORIDE 100 MILLIGRAM(S): 45 TABLET, EXTENDED RELEASE ORAL at 05:01

## 2018-06-10 RX ADMIN — Medication 1 TABLET(S): at 21:15

## 2018-06-10 RX ADMIN — HYDROMORPHONE HYDROCHLORIDE 2 MILLIGRAM(S): 2 INJECTION INTRAMUSCULAR; INTRAVENOUS; SUBCUTANEOUS at 21:27

## 2018-06-10 RX ADMIN — BUDESONIDE AND FORMOTEROL FUMARATE DIHYDRATE 2 PUFF(S): 160; 4.5 AEROSOL RESPIRATORY (INHALATION) at 05:01

## 2018-06-10 RX ADMIN — MEROPENEM 100 MILLIGRAM(S): 1 INJECTION INTRAVENOUS at 01:28

## 2018-06-10 RX ADMIN — Medication 1 TABLET(S): at 13:43

## 2018-06-10 RX ADMIN — TIOTROPIUM BROMIDE 1 CAPSULE(S): 18 CAPSULE ORAL; RESPIRATORY (INHALATION) at 11:02

## 2018-06-10 RX ADMIN — SIMVASTATIN 20 MILLIGRAM(S): 20 TABLET, FILM COATED ORAL at 21:15

## 2018-06-10 RX ADMIN — HYDROMORPHONE HYDROCHLORIDE 0.5 MILLIGRAM(S): 2 INJECTION INTRAMUSCULAR; INTRAVENOUS; SUBCUTANEOUS at 04:30

## 2018-06-10 RX ADMIN — HYDROMORPHONE HYDROCHLORIDE 2 MILLIGRAM(S): 2 INJECTION INTRAMUSCULAR; INTRAVENOUS; SUBCUTANEOUS at 22:29

## 2018-06-10 RX ADMIN — Medication 500 MILLIGRAM(S): at 11:02

## 2018-06-10 RX ADMIN — GABAPENTIN 200 MILLIGRAM(S): 400 CAPSULE ORAL at 21:15

## 2018-06-10 RX ADMIN — Medication 12.5 MILLIGRAM(S): at 05:03

## 2018-06-10 RX ADMIN — HYDROMORPHONE HYDROCHLORIDE 0.5 MILLIGRAM(S): 2 INJECTION INTRAMUSCULAR; INTRAVENOUS; SUBCUTANEOUS at 09:25

## 2018-06-10 RX ADMIN — Medication 1 MILLIGRAM(S): at 11:02

## 2018-06-10 RX ADMIN — Medication 1 TABLET(S): at 11:02

## 2018-06-10 RX ADMIN — HYDROMORPHONE HYDROCHLORIDE 0.5 MILLIGRAM(S): 2 INJECTION INTRAMUSCULAR; INTRAVENOUS; SUBCUTANEOUS at 09:45

## 2018-06-10 RX ADMIN — MINOCYCLINE HYDROCHLORIDE 100 MILLIGRAM(S): 45 TABLET, EXTENDED RELEASE ORAL at 17:55

## 2018-06-10 RX ADMIN — HYDROMORPHONE HYDROCHLORIDE 2 MILLIGRAM(S): 2 INJECTION INTRAMUSCULAR; INTRAVENOUS; SUBCUTANEOUS at 16:18

## 2018-06-10 RX ADMIN — POLYETHYLENE GLYCOL 3350 17 GRAM(S): 17 POWDER, FOR SOLUTION ORAL at 11:02

## 2018-06-10 RX ADMIN — Medication 1 TABLET(S): at 05:01

## 2018-06-10 RX ADMIN — BUDESONIDE AND FORMOTEROL FUMARATE DIHYDRATE 2 PUFF(S): 160; 4.5 AEROSOL RESPIRATORY (INHALATION) at 17:57

## 2018-06-10 RX ADMIN — Medication 81 MILLIGRAM(S): at 11:02

## 2018-06-10 RX ADMIN — WARFARIN SODIUM 0.5 MILLIGRAM(S): 2.5 TABLET ORAL at 21:16

## 2018-06-10 RX ADMIN — MEROPENEM 100 MILLIGRAM(S): 1 INJECTION INTRAVENOUS at 09:25

## 2018-06-10 RX ADMIN — GABAPENTIN 200 MILLIGRAM(S): 400 CAPSULE ORAL at 13:43

## 2018-06-10 RX ADMIN — Medication 3 MILLIGRAM(S): at 21:15

## 2018-06-10 RX ADMIN — HYDROMORPHONE HYDROCHLORIDE 0.5 MILLIGRAM(S): 2 INJECTION INTRAMUSCULAR; INTRAVENOUS; SUBCUTANEOUS at 04:16

## 2018-06-10 RX ADMIN — PANTOPRAZOLE SODIUM 40 MILLIGRAM(S): 20 TABLET, DELAYED RELEASE ORAL at 05:02

## 2018-06-10 NOTE — DIETITIAN INITIAL EVALUATION ADULT. - ETIOLOGY
inadequate protein-energy intake in the setting of prolonged hospitalizations & increased needs for healing suspected history of excessive intake of energy

## 2018-06-10 NOTE — PROGRESS NOTE ADULT - ASSESSMENT
77 year old female s/p rudy from right knee and spacer for strept infection then returned with nonhealing wound and had spacer change, fusion ruthann, plastic closure and grew mrsa.   Wound developed necrotic skin, then pneumonia, and hematoma over the right thigh with necrosis of skin too.   Finished full course of iv abx, on suppressive minocycline for the knee. Pneumonia treated.  Debrided thigh and abdomen wound with VAC.  Dermatology performed 4mm punch bx performed of R thigh- cx grew enterobacter and morganella, suggestive of colonizing roopa in a patient with prolonged hospital stay,   Path as reviewed above- inflammatory infiltrate, no features of pyoderma in sample.Appreciate dermatology f/u, no support for PG at this point.  She has been stable the past few weeks.  She is now readmitted after one day for nausea and dx on CT A/P with tip appendicitis, she was seem by the surgical service who report that the patient is a poor surgical candidate and it to be treated medically with antibiotics, Wound care management as per plastics   reviewed noted from surgery continue with medical management     Plan:   Continue suppressive minocycline 100  mg po bid   Continue  ABX to Merrem 1 g IV q8 for appendicitis, DAY 3, continued supportive care as per surgery

## 2018-06-10 NOTE — DIETITIAN INITIAL EVALUATION ADULT. - NS FNS REASON FOR WEIGHT CHANG
Per HHA, patient weighed 260 pounds ~1/2018. Per previous RD note (4/16/18), pt weighed 257.9 pounds. Noted current weight upon this admission of 220.6 pounds. HHA suspects weight loss 2/2 decreased PO intakes with multiple hospitalizations & has noticed pt appears thinner but unable to quantify amount of wt lost./decreased po intake

## 2018-06-10 NOTE — PROGRESS NOTE ADULT - SUBJECTIVE AND OBJECTIVE BOX
Patient is a 77y old  Female who presents with a chief complaint of fever, lethargy (07 Jun 2018 22:19)      INTERVAL HPI/OVERNIGHT EVENTS:none, pain controlled with meds      Vital Signs Last 24 Hrs  T(C): 36.7 (10 [place] 2018 09:25), Max: 37.1 (09 Jun 2018 17:25)  T(F): 98 (10 [place] 2018 09:25), Max: 98.8 (09 Jun 2018 17:25)  HR: 62 (10 [place] 2018 09:25) (62 - 79)  BP: 105/73 (10 [place] 2018 09:25) (100/68 - 127/80)  BP(mean): --  RR: 18 (10 [place] 2018 09:25) (18 - 18)  SpO2: 98% (10 [place] 2018 09:25) (94% - 98%)    acetaminophen   Tablet. 650 milliGRAM(s) Oral every 6 hours PRN  ALPRAZolam 0.25 milliGRAM(s) Oral daily PRN  ALPRAZolam 0.25 milliGRAM(s) Oral once  ascorbic acid 500 milliGRAM(s) Oral daily  aspirin enteric coated 81 milliGRAM(s) Oral daily  bisacodyl Suppository 10 milliGRAM(s) Rectal daily PRN  buDESOnide 160 MICROgram(s)/formoterol 4.5 MICROgram(s) Inhaler 2 Puff(s) Inhalation two times a day  calcium carbonate 1250 mG + Vitamin D (OsCal 500 + D) 1 Tablet(s) Oral three times a day  folic acid 1 milliGRAM(s) Oral daily  gabapentin 200 milliGRAM(s) Oral three times a day  HYDROmorphone   Tablet 2 milliGRAM(s) Oral every 4 hours PRN  HYDROmorphone  Injectable 0.5 milliGRAM(s) IV Push every 4 hours PRN  melatonin 3 milliGRAM(s) Oral at bedtime  meropenem  IVPB 1000 milliGRAM(s) IV Intermittent every 8 hours  metoprolol tartrate 12.5 milliGRAM(s) Oral two times a day  minocycline 100 milliGRAM(s) Oral two times a day  multivitamin 1 Tablet(s) Oral daily  ondansetron Injectable 4 milliGRAM(s) IV Push once  pantoprazole    Tablet 40 milliGRAM(s) Oral before breakfast  polyethylene glycol 3350 17 Gram(s) Oral daily  senna 2 Tablet(s) Oral at bedtime PRN  simvastatin 20 milliGRAM(s) Oral at bedtime  tiotropium 18 MICROgram(s) Capsule 1 Capsule(s) Inhalation daily  warfarin 0.5 milliGRAM(s) Oral once      PHYSICAL EXAM:  GENERAL: NAD,   EYES: conjunctiva and sclera clear  ENMT: Moist mucous membranes  NECK: Supple, No JVD, Normal thyroid  NERVOUS SYSTEM:  Alert & Oriented X2,   CHEST/LUNG: Clear to auscultation bilaterally; No rales, rhonchi, wheezing, or rubs  HEART: Regular rate and rhythm; No murmurs, rubs, or gallops  ABDOMEN: Soft, Nontender, Nondistended; wound vac, gluteal wound dressing  EXTREMITIES:  rt prox thigh wound vac  SKIN: No rashes or lesions    Consultant(s) Notes Reviewed:  [x ] YES  [ ] NO  Care Discussed with Consultants/Other Providers [ x] YES  [ ] NO    LABS:                        7.8    9.43  )-----------( 246      ( 09 Jun 2018 08:41 )             26.3     06-09    138  |  99  |  29<H>  ----------------------------<  101<H>  3.9   |  28  |  0.80    Ca    9.4      09 Jun 2018 05:59      PT/INR - ( 10 [place] 2018 08:37 )   PT: 25.6 sec;   INR: 2.23 ratio             CAPILLARY BLOOD GLUCOSE                RADIOLOGY & ADDITIONAL TESTS:    Imaging Personally Reviewed:  [ ] YES  [ ] NO

## 2018-06-10 NOTE — DIETITIAN INITIAL EVALUATION ADULT. - ADHERENCE
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 Per HHA, pt does not like leafy green vegetables or other food sources high in vitamin K therefore pt avoids altogether. Pt/HHA made aware patient may consume foods containing vitamin K; however, intake must be consistent from day-to-day. Per HHA, patient also drinks 2-3 Nepro daily & 2 Howard daily. States at one time patient was drinking Ensure daily; however, pt was experiencing some renal issues & nephrologist recommended pt drink Nepro instead. States she has been also drinking Howard daily (since last admission) to assist with wound healing. Pt with NKFA but does not eat fish 2/2 food preference. States pt takes vitamin C, ferrous sulfate, calcium, MVI, and folic acid daily

## 2018-06-10 NOTE — DIETITIAN INITIAL EVALUATION ADULT. - NS AS NUTRI INTERV MEALS SNACK
1) Recommend Low Sodium Diet given reports of poor PO intakes 2) Provide food preferences within dietary restrictions when feasible 3) Encourage good PO intakes

## 2018-06-10 NOTE — PROGRESS NOTE ADULT - SUBJECTIVE AND OBJECTIVE BOX
HD#4    77 y.o. female with extensive medical and surgical comorbidities being treated for possible tip appendicitis with IV AB Rx (meropenem).    She is afebrile with normal WBC count. Tolerating p.o.  in samll amounts. No N/V. No complaint of abdominal pain.    Abdominal exam: obese, soft, nondistended, nontender, without mass or guarding. VAC dressing in place.

## 2018-06-10 NOTE — DIETITIAN INITIAL EVALUATION ADULT. - SIGNS/SYMPTOMS
wt loss of 15% x6 months, mild edema, reported poor PO intakes PTA, pressure injury+wounds BMI 40.3 Kg/m^2

## 2018-06-10 NOTE — CHART NOTE - NSCHARTNOTEFT_GEN_A_CORE
Upon Nutritional Assessment by the Registered Dietitian your patient was determined to meet criteria / has evidence of the following diagnosis/diagnoses:          [x]  Moderate Protein Calorie Malnutrition   wt loss of 15% x6 months, mild edema, reported poor PO intakes PTA, pressure injury+wounds            [x] Morbid Obesity / BMI > 40  BMI 40.3Kg/m^2    Findings as based on:  [x] Comprehensive nutrition assessment   [ ] Nutrition Focused Physical Exam  [x] Other: medical record      Nutrition Plan/Recommendations:      Recommend Nepro 2x daily & Howard 2x daily   See dietitian initial assessment (6/10/18) for further detail regarding recommendations    PROVIDER Section:     By signing this assessment you are acknowledging and agree with the diagnosis/diagnoses assigned by the Registered Dietitian    Comments:

## 2018-06-10 NOTE — PROGRESS NOTE ADULT - ASSESSMENT
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  Plan: Patient has multiple wounds on R thigh as well as mid-abdomen. Seen by plastic surgery and not felt to be infected.   - continue vac to abdomen, continue adaptic and vac to thigh  - vacs are managed by PT Jayro).,vac changes M/W/F.  - continue daily wet-to-dry dressing over open portion of knee wound  -consult wound care (Dr. Joseph) for any other wounds.   Follow up surgery, ortho, and plastics recs    #[facility] line care    #Joint infection.  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    # S/P TAVR (transcatheter aortic valve replacement). Plan: - continue Coumadin, daily INR.

## 2018-06-10 NOTE — DIETITIAN INITIAL EVALUATION ADULT. - ENERGY NEEDS
Ht 62 inches Wt 220.6 pounds BMI 40.3 Kg/m^2  IBW 110pounds +/- 10%; 200% IBW  Edema: 1+ generalized, 3+ right leg/knee edema Skin: stage I left sacrum pressure ulcer, multiple wounds  Other pertinent information: 76 yo female with PMH of CAD S/P HERBERT to LAD, severe AS S/P TAVR, bradycardia S/P PPM, MVR, DVT/PE on coumadin with recent admission including TKR, PNA, MRSA infection. Now presents from rehab with fever, lethargy, nausea possible 2/2 appendicitis (poor surgical candidate per chart, treated with abx).

## 2018-06-10 NOTE — PROGRESS NOTE ADULT - ASSESSMENT
Possible tip appendicitis being treated with IV AB Rx.    Continue meropenem. ID Follow-up noted and appreciated.

## 2018-06-10 NOTE — DIETITIAN INITIAL EVALUATION ADULT. - NUTRITION INTERVENTION
Meals and Snack/Medical Food Supplements/Nutrition Education/Collaboration and Referral of Nutrition Care/Vitamin Nutrition Education/Collaboration and Referral of Nutrition Care

## 2018-06-10 NOTE — DIETITIAN INITIAL EVALUATION ADULT. - FACTORS AFF FOOD INTAKE
She is having a stress test wondering if there are any medications she needs to stop.    Per MBS (4/17/18), SLP recommended mechanical soft diet; however, during last admission diet was upgraded to regular texture due to patient/family's request/none

## 2018-06-10 NOTE — DIETITIAN INITIAL EVALUATION ADULT. - OTHER INFO
Nutrition consult received for assessment. Pt reports fair-good appetite & PO intake during this admission. Seen with multiple snacks/supplements at bedside and drinking Howard (brought from home) with assistance of HHA. Denies chewing/swallowing difficulty. Denies nausea/emesis. Last BM 6/9 per chart

## 2018-06-10 NOTE — DIETITIAN INITIAL EVALUATION ADULT. - NS AS NUTRI INTERV ED CONTENT3
Given wt loss & reported poor PO intakes, weight loss education not appropriate at this time. Provide diet education as appropriate & needed

## 2018-06-10 NOTE — PROGRESS NOTE ADULT - SUBJECTIVE AND OBJECTIVE BOX
CC: f/u for appendicitis     Patient reports feeling OK in bed, no fevers     REVIEW OF SYSTEMS:  All other review of systems negative (Comprehensive ROS)      Vital Signs Last 24 Hrs  T(C): 36.7 (10 Ludwin 2018 09:25), Max: 37.1 (09 Jun 2018 17:25)  T(F): 98 (10 Ludwin 2018 09:25), Max: 98.8 (09 Jun 2018 17:25)  HR: 62 (10 Ludwin 2018 09:25) (62 - 79)  BP: 105/73 (10 Ludwin 2018 09:25) (100/68 - 127/80)  BP(mean): --  RR: 18 (10 Ludwin 2018 09:25) (18 - 18)  SpO2: 98% (10 Ludwin 2018 09:25) (94% - 98%)    PHYSICAL EXAM:  General: alert, no acute distress  Eyes:  anicteric, no conjunctival injection, no discharge  Oropharynx: no lesions or injection 	  Neck: supple, without adenopathy  Lungs: clear to auscultation  Heart: regular rate and rhythm; no murmur, rubs or gallops  Abdomen: soft, nondistended, nontender, without mass or organomegaly Vac in place   Skin: no lesions  Extremities: no clubbing, cyanosis, or edema  Neurologic: alert, oriented, moves all extremities    LAB RESULTS:                                   7.8    9.43  )-----------( 246      ( 09 Jun 2018 08:41 )             26.3     06-09    138  |  99  |  29<H>  ----------------------------<  101<H>  3.9   |  28  |  0.80    Ca    9.4      09 Jun 2018 05:59  Mg     1.8     06-08    TPro  6.5  /  Alb  2.3<L>  /  TBili  0.6  /  DBili  x   /  AST  15  /  ALT  11  /  AlkPhos  93  06-08    LIVER FUNCTIONS - ( 08 Jun 2018 06:59 )  Alb: 2.3 g/dL / Pro: 6.5 g/dL / ALK PHOS: 93 U/L / ALT: 11 U/L / AST: 15 U/L / GGT: x           Urinalysis Basic - ( 07 Jun 2018 17:15 )    Color: Yellow / Appearance: SL Turbid / SG: >1.030 / pH: x  Gluc: x / Ketone: Negative  / Bili: Negative / Urobili: Negative   Blood: x / Protein: 30 mg/dL / Nitrite: Negative   Leuk Esterase: Large / RBC: x / WBC >50 /HPF   Sq Epi: x / Non Sq Epi: OCC /HPF / Bacteria: Few /HPF      MICROBIOLOGY:  RECENT CULTURES:  06-07 @ 19:48 .Blood Blood     No growth to date.      06-07 @ 18:40 .Urine Catheterized     No growth      06-07 @ 16:57 .Blood Blood     No growth to date.          RADIOLOGY REVIEWED:        Antimicrobials Day #    meropenem  IVPB 1000 milliGRAM(s) IV Intermittent every 8 hours  minocycline 100 milliGRAM(s) Oral two times a day    Other Medications Reviewed

## 2018-06-10 NOTE — DIETITIAN INITIAL EVALUATION ADULT. - ORAL INTAKE PTA
Per discussion with HHA, patient has been in the hospital/rehab since Jan. 2018. States during her last admission she was receiving a mechanical soft diet with poor PO intake due to distaste for texture. States that diet was then upgraded & PO intake improved. Pt was then discharged to [facility] rehab where family was supplementing meals with food from home/take-out

## 2018-06-10 NOTE — DIETITIAN INITIAL EVALUATION ADULT. - NS AS NUTRI INTERV MEDICAL AND FOOD SUPPLEMENTS
Recommend Nepro 2x daily (as per patient's request) to provide 850cal, 38.2gm prot & Howard 2x daily to provide 160cal, 28gm prot

## 2018-06-10 NOTE — DIETITIAN INITIAL EVALUATION ADULT. - PERTINENT MEDS FT
coumadin, dilaudid, vitamin C, ducolax, [id number], folic acid, MVI, zofran, protonix, miralax, senna

## 2018-06-11 LAB
ANION GAP SERPL CALC-SCNC: 10 MMOL/L — SIGNIFICANT CHANGE UP (ref 5–17)
BASOPHILS # BLD AUTO: 0.02 K/UL — SIGNIFICANT CHANGE UP (ref 0–0.2)
BASOPHILS NFR BLD AUTO: 0.3 % — SIGNIFICANT CHANGE UP (ref 0–2)
BUN SERPL-MCNC: 26 MG/DL — HIGH (ref 7–23)
CALCIUM SERPL-MCNC: 9.3 MG/DL — SIGNIFICANT CHANGE UP (ref 8.4–10.5)
CHLORIDE SERPL-SCNC: 99 MMOL/L — SIGNIFICANT CHANGE UP (ref 96–108)
CO2 SERPL-SCNC: 29 MMOL/L — SIGNIFICANT CHANGE UP (ref 22–31)
CREAT SERPL-MCNC: 0.66 MG/DL — SIGNIFICANT CHANGE UP (ref 0.5–1.3)
EOSINOPHIL # BLD AUTO: 0.3 K/UL — SIGNIFICANT CHANGE UP (ref 0–0.5)
EOSINOPHIL NFR BLD AUTO: 4.1 % — SIGNIFICANT CHANGE UP (ref 0–6)
GLUCOSE SERPL-MCNC: 104 MG/DL — HIGH (ref 70–99)
HCT VFR BLD CALC: 25.5 % — LOW (ref 34.5–45)
HGB BLD-MCNC: 7.5 G/DL — LOW (ref 11.5–15.5)
IMM GRANULOCYTES NFR BLD AUTO: 0.5 % — SIGNIFICANT CHANGE UP (ref 0–1.5)
INR BLD: 2.19 RATIO — HIGH (ref 0.88–1.16)
LYMPHOCYTES # BLD AUTO: 1.76 K/UL — SIGNIFICANT CHANGE UP (ref 1–3.3)
LYMPHOCYTES # BLD AUTO: 24.1 % — SIGNIFICANT CHANGE UP (ref 13–44)
MCHC RBC-ENTMCNC: 26.5 PG — LOW (ref 27–34)
MCHC RBC-ENTMCNC: 29.4 GM/DL — LOW (ref 32–36)
MCV RBC AUTO: 90.1 FL — SIGNIFICANT CHANGE UP (ref 80–100)
MONOCYTES # BLD AUTO: 0.47 K/UL — SIGNIFICANT CHANGE UP (ref 0–0.9)
MONOCYTES NFR BLD AUTO: 6.4 % — SIGNIFICANT CHANGE UP (ref 2–14)
NEUTROPHILS # BLD AUTO: 4.7 K/UL — SIGNIFICANT CHANGE UP (ref 1.8–7.4)
NEUTROPHILS NFR BLD AUTO: 64.6 % — SIGNIFICANT CHANGE UP (ref 43–77)
PLATELET # BLD AUTO: 247 K/UL — SIGNIFICANT CHANGE UP (ref 150–400)
POTASSIUM SERPL-MCNC: 4.2 MMOL/L — SIGNIFICANT CHANGE UP (ref 3.5–5.3)
POTASSIUM SERPL-SCNC: 4.2 MMOL/L — SIGNIFICANT CHANGE UP (ref 3.5–5.3)
PROTHROM AB SERPL-ACNC: 25.1 SEC — HIGH (ref 10–13.1)
RBC # BLD: 2.83 M/UL — LOW (ref 3.8–5.2)
RBC # FLD: 18.4 % — HIGH (ref 10.3–14.5)
SODIUM SERPL-SCNC: 138 MMOL/L — SIGNIFICANT CHANGE UP (ref 135–145)
WBC # BLD: 7.29 K/UL — SIGNIFICANT CHANGE UP (ref 3.8–10.5)
WBC # FLD AUTO: 7.29 K/UL — SIGNIFICANT CHANGE UP (ref 3.8–10.5)

## 2018-06-11 RX ORDER — HYDROMORPHONE HYDROCHLORIDE 2 MG/ML
2 INJECTION INTRAMUSCULAR; INTRAVENOUS; SUBCUTANEOUS
Qty: 0 | Refills: 0 | Status: DISCONTINUED | OUTPATIENT
Start: 2018-06-11 | End: 2018-06-18

## 2018-06-11 RX ORDER — WARFARIN SODIUM 2.5 MG/1
0.5 TABLET ORAL ONCE
Qty: 0 | Refills: 0 | Status: COMPLETED | OUTPATIENT
Start: 2018-06-11 | End: 2018-06-11

## 2018-06-11 RX ADMIN — MEROPENEM 100 MILLIGRAM(S): 1 INJECTION INTRAVENOUS at 08:12

## 2018-06-11 RX ADMIN — HYDROMORPHONE HYDROCHLORIDE 2 MILLIGRAM(S): 2 INJECTION INTRAMUSCULAR; INTRAVENOUS; SUBCUTANEOUS at 05:46

## 2018-06-11 RX ADMIN — HYDROMORPHONE HYDROCHLORIDE 2 MILLIGRAM(S): 2 INJECTION INTRAMUSCULAR; INTRAVENOUS; SUBCUTANEOUS at 10:13

## 2018-06-11 RX ADMIN — SENNA PLUS 2 TABLET(S): 8.6 TABLET ORAL at 22:48

## 2018-06-11 RX ADMIN — Medication 3 MILLIGRAM(S): at 22:47

## 2018-06-11 RX ADMIN — PANTOPRAZOLE SODIUM 40 MILLIGRAM(S): 20 TABLET, DELAYED RELEASE ORAL at 05:46

## 2018-06-11 RX ADMIN — Medication 1 TABLET(S): at 05:46

## 2018-06-11 RX ADMIN — HYDROMORPHONE HYDROCHLORIDE 0.5 MILLIGRAM(S): 2 INJECTION INTRAMUSCULAR; INTRAVENOUS; SUBCUTANEOUS at 23:30

## 2018-06-11 RX ADMIN — Medication 1 TABLET(S): at 13:25

## 2018-06-11 RX ADMIN — POLYETHYLENE GLYCOL 3350 17 GRAM(S): 17 POWDER, FOR SOLUTION ORAL at 13:22

## 2018-06-11 RX ADMIN — TIOTROPIUM BROMIDE 1 CAPSULE(S): 18 CAPSULE ORAL; RESPIRATORY (INHALATION) at 13:24

## 2018-06-11 RX ADMIN — HYDROMORPHONE HYDROCHLORIDE 2 MILLIGRAM(S): 2 INJECTION INTRAMUSCULAR; INTRAVENOUS; SUBCUTANEOUS at 06:47

## 2018-06-11 RX ADMIN — BUDESONIDE AND FORMOTEROL FUMARATE DIHYDRATE 2 PUFF(S): 160; 4.5 AEROSOL RESPIRATORY (INHALATION) at 18:34

## 2018-06-11 RX ADMIN — GABAPENTIN 200 MILLIGRAM(S): 400 CAPSULE ORAL at 13:24

## 2018-06-11 RX ADMIN — Medication 650 MILLIGRAM(S): at 08:10

## 2018-06-11 RX ADMIN — SIMVASTATIN 20 MILLIGRAM(S): 20 TABLET, FILM COATED ORAL at 22:47

## 2018-06-11 RX ADMIN — GABAPENTIN 200 MILLIGRAM(S): 400 CAPSULE ORAL at 22:47

## 2018-06-11 RX ADMIN — MEROPENEM 100 MILLIGRAM(S): 1 INJECTION INTRAVENOUS at 02:05

## 2018-06-11 RX ADMIN — HYDROMORPHONE HYDROCHLORIDE 2 MILLIGRAM(S): 2 INJECTION INTRAMUSCULAR; INTRAVENOUS; SUBCUTANEOUS at 11:02

## 2018-06-11 RX ADMIN — Medication 1 TABLET(S): at 22:47

## 2018-06-11 RX ADMIN — MINOCYCLINE HYDROCHLORIDE 100 MILLIGRAM(S): 45 TABLET, EXTENDED RELEASE ORAL at 05:45

## 2018-06-11 RX ADMIN — Medication 12.5 MILLIGRAM(S): at 05:45

## 2018-06-11 RX ADMIN — MEROPENEM 100 MILLIGRAM(S): 1 INJECTION INTRAVENOUS at 22:59

## 2018-06-11 RX ADMIN — Medication 650 MILLIGRAM(S): at 08:40

## 2018-06-11 RX ADMIN — WARFARIN SODIUM 0.5 MILLIGRAM(S): 2.5 TABLET ORAL at 22:48

## 2018-06-11 RX ADMIN — Medication 81 MILLIGRAM(S): at 13:24

## 2018-06-11 RX ADMIN — Medication 500 MILLIGRAM(S): at 13:25

## 2018-06-11 RX ADMIN — GABAPENTIN 200 MILLIGRAM(S): 400 CAPSULE ORAL at 05:45

## 2018-06-11 RX ADMIN — HYDROMORPHONE HYDROCHLORIDE 0.5 MILLIGRAM(S): 2 INJECTION INTRAMUSCULAR; INTRAVENOUS; SUBCUTANEOUS at 11:03

## 2018-06-11 RX ADMIN — HYDROMORPHONE HYDROCHLORIDE 0.5 MILLIGRAM(S): 2 INJECTION INTRAMUSCULAR; INTRAVENOUS; SUBCUTANEOUS at 22:59

## 2018-06-11 RX ADMIN — BUDESONIDE AND FORMOTEROL FUMARATE DIHYDRATE 2 PUFF(S): 160; 4.5 AEROSOL RESPIRATORY (INHALATION) at 05:45

## 2018-06-11 RX ADMIN — MINOCYCLINE HYDROCHLORIDE 100 MILLIGRAM(S): 45 TABLET, EXTENDED RELEASE ORAL at 18:34

## 2018-06-11 RX ADMIN — Medication 1 MILLIGRAM(S): at 13:25

## 2018-06-11 RX ADMIN — Medication 12.5 MILLIGRAM(S): at 18:34

## 2018-06-11 NOTE — PROGRESS NOTE ADULT - ASSESSMENT
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  - continue vac to abdomen, continue adaptic and vac to thigh  -vac changes M/W/F.  - continue daily wet-to-dry dressing over open portion of knee wound  -consult wound care (Dr. Joseph) for  other wounds.   Follow up surgery, ortho, and plastics recs  #[facility]c line care    #Pain control- dilaudid2 po q4 prn, 0.5 iv prior to dressing change  bowel regimen    # chr Anemia: stable, monitor h/h closely  #Joint infection.  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    # S/P TAVR (transcatheter aortic valve replacement). Plan: - continue Coumadin, daily INR.

## 2018-06-11 NOTE — PROGRESS NOTE ADULT - SUBJECTIVE AND OBJECTIVE BOX
CC: f/u for appendicitis     Patient reports feeling OK no abdominal pain     REVIEW OF SYSTEMS:  All other review of systems negative (Comprehensive ROS)      Vital Signs Last 24 Hrs  T(C): 36.9 (11 Jun 2018 14:21), Max: 36.9 (11 Jun 2018 14:21)  T(F): 98.4 (11 Jun 2018 14:21), Max: 98.4 (11 Jun 2018 14:21)  HR: 63 (11 Jun 2018 14:21) (61 - 68)  BP: 109/69 (11 Jun 2018 14:21) (107/57 - 137/80)  BP(mean): --  RR: 18 (11 Jun 2018 14:21) (18 - 18)  SpO2: 99% (11 Jun 2018 14:21) (98% - 100%)    PHYSICAL EXAM:  General: alert, no acute distress  Eyes:  anicteric, no conjunctival injection, no discharge  Oropharynx: no lesions or injection 	  Neck: supple, without adenopathy  Lungs: clear to auscultation  Heart: regular rate and rhythm; no murmur, rubs or gallops  Abdomen: soft, nondistended, nontender, without mass or organomegaly Vac in place   Skin: no lesions  Extremities: no clubbing, cyanosis, or edema  Neurologic: alert, oriented, moves all extremities    LAB RESULTS:                        7.5    7.29  )-----------( 247      ( 11 Jun 2018 08:12 )             25.5                                      7.8    9.43  )-----------( 246      ( 09 Jun 2018 08:41 )             26.3     06-09    138  |  99  |  29<H>  ----------------------------<  101<H>  3.9   |  28  |  0.80    Ca    9.4      09 Jun 2018 05:59  Mg     1.8     06-08    TPro  6.5  /  Alb  2.3<L>  /  TBili  0.6  /  DBili  x   /  AST  15  /  ALT  11  /  AlkPhos  93  06-08    LIVER FUNCTIONS - ( 08 Jun 2018 06:59 )  Alb: 2.3 g/dL / Pro: 6.5 g/dL / ALK PHOS: 93 U/L / ALT: 11 U/L / AST: 15 U/L / GGT: x           Urinalysis Basic - ( 07 Jun 2018 17:15 )    Color: Yellow / Appearance: SL Turbid / SG: >1.030 / pH: x  Gluc: x / Ketone: Negative  / Bili: Negative / Urobili: Negative   Blood: x / Protein: 30 mg/dL / Nitrite: Negative   Leuk Esterase: Large / RBC: x / WBC >50 /HPF   Sq Epi: x / Non Sq Epi: OCC /HPF / Bacteria: Few /HPF      MICROBIOLOGY:  RECENT CULTURES:  06-07 @ 19:48 .Blood Blood     No growth to date.      06-07 @ 18:40 .Urine Catheterized     No growth      06-07 @ 16:57 .Blood Blood     No growth to date.          RADIOLOGY REVIEWED:        Antimicrobials Day #    meropenem  IVPB 1000 milliGRAM(s) IV Intermittent every 8 hours  minocycline 100 milliGRAM(s) Oral two times a day    Other Medications Reviewed

## 2018-06-11 NOTE — PROGRESS NOTE ADULT - ATTENDING COMMENTS
General Surgery     Pt remains afebrile w nl WBC.  No c/o abdominal pain or nausea. Last BM 6/9    On exam, abdomen soft and entirely nontender    Tip appendicitis, continue treatment w Meropenem. No plan for surgery at this point.  Continue diet as tolerated.

## 2018-06-11 NOTE — PROGRESS NOTE ADULT - ASSESSMENT
77 year old female s/p rudy from right knee and spacer for strept infection then returned with nonhealing wound and had spacer change, fusion ruthann, plastic closure and grew mrsa.   Wound developed necrotic skin, then pneumonia, and hematoma over the right thigh with necrosis of skin too.   Finished full course of iv abx, on suppressive minocycline for the knee. Pneumonia treated.  Debrided thigh and abdomen wound with VAC.  Dermatology performed 4mm punch bx performed of R thigh- cx grew enterobacter and morganella, suggestive of colonizing roopa in a patient with prolonged hospital stay,   Path as reviewed above- inflammatory infiltrate, no features of pyoderma in sample.Appreciate dermatology f/u, no support for PG at this point.  She has been stable the past few weeks.  She is now readmitted after one day for nausea and dx on CT A/P with tip appendicitis, she was seem by the surgical service who report that the patient is a poor surgical candidate and it to be treated medically with antibiotics, Wound care management as per plastics   reviewed noted from surgery continue with medical management   reviewed blood cx is no growth to date and ucx is no growth, no fever and wbc wnl    Plan:   Continue suppressive minocycline 100  mg po bid   Continue  ABX to Merrem 1 g IV q8 for appendicitis, DAY 4 of 10, continued supportive care as per surgery

## 2018-06-11 NOTE — PROGRESS NOTE ADULT - ASSESSMENT
no plan for orthopedic surgical intervention per family and patient preference  local wound care to knee and vac therapy to abd/thigh per plastics and wound care teams  PO abx per ID team  encourage patient to spend majority of bed oob in bedside chair as able  PT to help mobilize, she must remain 50% wb on the RLE and NO KNEE MOTION allowed, knee immobilizer if oob  coumadin, inr goal 2-3  continue to optimize nutrition  continue to involve psych	  nonop plan for appendicitis per gen surg

## 2018-06-11 NOTE — PROGRESS NOTE ADULT - SUBJECTIVE AND OBJECTIVE BOX
pain controlled, emotional    abdominal wound - dressing in place by prs  RLE  thigh dressing in place b prs  knee wound with stable fibrinous base, dressed by prs  5/5 ta/ehl/gcs  silt l4-s1  2+ dp pulse

## 2018-06-11 NOTE — PROGRESS NOTE ADULT - SUBJECTIVE AND OBJECTIVE BOX
Patient is a 77y old  Female who presents with a chief complaint of fever, lethargy (07 Jun 2018 22:19)      INTERVAL HPI/OVERNIGHT EVENTS:feels well, pain well controlled      Vital Signs Last 24 Hrs  T(C): 36.9 (11 Jun 2018 14:21), Max: 36.9 (11 Jun 2018 14:21)  T(F): 98.4 (11 Jun 2018 14:21), Max: 98.4 (11 Jun 2018 14:21)  HR: 63 (11 Jun 2018 14:21) (61 - 68)  BP: 109/69 (11 Jun 2018 14:21) (107/57 - 137/80)  BP(mean): --  RR: 18 (11 Jun 2018 14:21) (18 - 18)  SpO2: 99% (11 Jun 2018 14:21) (98% - 100%)    acetaminophen   Tablet. 650 milliGRAM(s) Oral every 6 hours PRN  ALPRAZolam 0.25 milliGRAM(s) Oral daily PRN  ALPRAZolam 0.25 milliGRAM(s) Oral once  ascorbic acid 500 milliGRAM(s) Oral daily  aspirin enteric coated 81 milliGRAM(s) Oral daily  bisacodyl Suppository 10 milliGRAM(s) Rectal daily PRN  buDESOnide 160 MICROgram(s)/formoterol 4.5 MICROgram(s) Inhaler 2 Puff(s) Inhalation two times a day  calcium carbonate 1250 mG + Vitamin D (OsCal 500 + D) 1 Tablet(s) Oral three times a day  folic acid 1 milliGRAM(s) Oral daily  gabapentin 200 milliGRAM(s) Oral three times a day  HYDROmorphone   Tablet 2 milliGRAM(s) Oral every 3 hours PRN  HYDROmorphone  Injectable 0.5 milliGRAM(s) IV Push every 4 hours PRN  melatonin 3 milliGRAM(s) Oral at bedtime  meropenem  IVPB 1000 milliGRAM(s) IV Intermittent every 8 hours  metoprolol tartrate 12.5 milliGRAM(s) Oral two times a day  minocycline 100 milliGRAM(s) Oral two times a day  multivitamin 1 Tablet(s) Oral daily  ondansetron Injectable 4 milliGRAM(s) IV Push once  pantoprazole    Tablet 40 milliGRAM(s) Oral before breakfast  polyethylene glycol 3350 17 Gram(s) Oral daily  senna 2 Tablet(s) Oral at bedtime PRN  simvastatin 20 milliGRAM(s) Oral at bedtime  tiotropium 18 MICROgram(s) Capsule 1 Capsule(s) Inhalation daily  warfarin 0.5 milliGRAM(s) Oral once      PHYSICAL EXAM:  GENERAL: NAD,   EYES: conjunctiva and sclera clear  ENMT: Moist mucous membranes  NECK: Supple, No JVD, Normal thyroid  NERVOUS SYSTEM:  Alert & Oriented X3,   CHEST/LUNG: Clear to auscultation bilaterally; No rales, rhonchi, wheezing, or rubs  HEART: Regular rate and rhythm; No murmurs, rubs, or gallops  ABDOMEN: Soft, Nontender, wound vac+  EXTREMITIES:  rt knee dressing, proc thigh wound vac, rt gluteal wound dressing  LYMPH: No lymphadenopathy noted  SKIN: No rashes or lesions    Consultant(s) Notes Reviewed:  [x ] YES  [ ] NO  Care Discussed with Consultants/Other Providers [ x] YES  [ ] NO    LABS:                        7.5    7.29  )-----------( 247      ( 11 Jun 2018 08:12 )             25.5     06-11    138  |  99  |  26<H>  ----------------------------<  104<H>  4.2   |  29  |  0.66    Ca    9.3      11 Jun 2018 06:15      PT/INR - ( 11 Jun 2018 10:06 )   PT: 25.1 sec;   INR: 2.19 ratio             CAPILLARY BLOOD GLUCOSE                RADIOLOGY & ADDITIONAL TESTS:    Imaging Personally Reviewed:  [x ] YES  [ ] NO

## 2018-06-12 LAB
ANION GAP SERPL CALC-SCNC: 8 MMOL/L — SIGNIFICANT CHANGE UP (ref 5–17)
BUN SERPL-MCNC: 25 MG/DL — HIGH (ref 7–23)
CALCIUM SERPL-MCNC: 9.3 MG/DL — SIGNIFICANT CHANGE UP (ref 8.4–10.5)
CHLORIDE SERPL-SCNC: 102 MMOL/L — SIGNIFICANT CHANGE UP (ref 96–108)
CO2 SERPL-SCNC: 29 MMOL/L — SIGNIFICANT CHANGE UP (ref 22–31)
CREAT SERPL-MCNC: 0.57 MG/DL — SIGNIFICANT CHANGE UP (ref 0.5–1.3)
CULTURE RESULTS: SIGNIFICANT CHANGE UP
CULTURE RESULTS: SIGNIFICANT CHANGE UP
GLUCOSE SERPL-MCNC: 99 MG/DL — SIGNIFICANT CHANGE UP (ref 70–99)
HCT VFR BLD CALC: 27.7 % — LOW (ref 34.5–45)
HGB BLD-MCNC: 7.8 G/DL — LOW (ref 11.5–15.5)
INR BLD: 2.08 RATIO — HIGH (ref 0.88–1.16)
MCHC RBC-ENTMCNC: 25.7 PG — LOW (ref 27–34)
MCHC RBC-ENTMCNC: 28.2 GM/DL — LOW (ref 32–36)
MCV RBC AUTO: 91.4 FL — SIGNIFICANT CHANGE UP (ref 80–100)
PLATELET # BLD AUTO: 280 K/UL — SIGNIFICANT CHANGE UP (ref 150–400)
POTASSIUM SERPL-MCNC: 4.3 MMOL/L — SIGNIFICANT CHANGE UP (ref 3.5–5.3)
POTASSIUM SERPL-SCNC: 4.3 MMOL/L — SIGNIFICANT CHANGE UP (ref 3.5–5.3)
PROTHROM AB SERPL-ACNC: 23.8 SEC — HIGH (ref 10–13.1)
RBC # BLD: 3.03 M/UL — LOW (ref 3.8–5.2)
RBC # FLD: 18.5 % — HIGH (ref 10.3–14.5)
SODIUM SERPL-SCNC: 139 MMOL/L — SIGNIFICANT CHANGE UP (ref 135–145)
SPECIMEN SOURCE: SIGNIFICANT CHANGE UP
SPECIMEN SOURCE: SIGNIFICANT CHANGE UP
WBC # BLD: 7.7 K/UL — SIGNIFICANT CHANGE UP (ref 3.8–10.5)
WBC # FLD AUTO: 7.7 K/UL — SIGNIFICANT CHANGE UP (ref 3.8–10.5)

## 2018-06-12 RX ORDER — SODIUM HYPOCHLORITE 0.125 %
1 SOLUTION, NON-ORAL MISCELLANEOUS
Qty: 0 | Refills: 0 | Status: DISCONTINUED | OUTPATIENT
Start: 2018-06-12 | End: 2018-06-18

## 2018-06-12 RX ORDER — WARFARIN SODIUM 2.5 MG/1
0.5 TABLET ORAL ONCE
Qty: 0 | Refills: 0 | Status: COMPLETED | OUTPATIENT
Start: 2018-06-12 | End: 2018-06-12

## 2018-06-12 RX ADMIN — Medication 1 TABLET(S): at 14:11

## 2018-06-12 RX ADMIN — PANTOPRAZOLE SODIUM 40 MILLIGRAM(S): 20 TABLET, DELAYED RELEASE ORAL at 05:17

## 2018-06-12 RX ADMIN — Medication 1 MILLIGRAM(S): at 14:12

## 2018-06-12 RX ADMIN — HYDROMORPHONE HYDROCHLORIDE 0.5 MILLIGRAM(S): 2 INJECTION INTRAMUSCULAR; INTRAVENOUS; SUBCUTANEOUS at 13:29

## 2018-06-12 RX ADMIN — GABAPENTIN 200 MILLIGRAM(S): 400 CAPSULE ORAL at 14:11

## 2018-06-12 RX ADMIN — GABAPENTIN 200 MILLIGRAM(S): 400 CAPSULE ORAL at 21:00

## 2018-06-12 RX ADMIN — HYDROMORPHONE HYDROCHLORIDE 0.5 MILLIGRAM(S): 2 INJECTION INTRAMUSCULAR; INTRAVENOUS; SUBCUTANEOUS at 06:31

## 2018-06-12 RX ADMIN — HYDROMORPHONE HYDROCHLORIDE 0.5 MILLIGRAM(S): 2 INJECTION INTRAMUSCULAR; INTRAVENOUS; SUBCUTANEOUS at 17:27

## 2018-06-12 RX ADMIN — TIOTROPIUM BROMIDE 1 CAPSULE(S): 18 CAPSULE ORAL; RESPIRATORY (INHALATION) at 14:11

## 2018-06-12 RX ADMIN — HYDROMORPHONE HYDROCHLORIDE 0.5 MILLIGRAM(S): 2 INJECTION INTRAMUSCULAR; INTRAVENOUS; SUBCUTANEOUS at 20:56

## 2018-06-12 RX ADMIN — HYDROMORPHONE HYDROCHLORIDE 2 MILLIGRAM(S): 2 INJECTION INTRAMUSCULAR; INTRAVENOUS; SUBCUTANEOUS at 09:20

## 2018-06-12 RX ADMIN — Medication 500 MILLIGRAM(S): at 14:10

## 2018-06-12 RX ADMIN — Medication 81 MILLIGRAM(S): at 14:12

## 2018-06-12 RX ADMIN — WARFARIN SODIUM 0.5 MILLIGRAM(S): 2.5 TABLET ORAL at 21:01

## 2018-06-12 RX ADMIN — HYDROMORPHONE HYDROCHLORIDE 0.5 MILLIGRAM(S): 2 INJECTION INTRAMUSCULAR; INTRAVENOUS; SUBCUTANEOUS at 17:50

## 2018-06-12 RX ADMIN — MEROPENEM 100 MILLIGRAM(S): 1 INJECTION INTRAVENOUS at 21:01

## 2018-06-12 RX ADMIN — HYDROMORPHONE HYDROCHLORIDE 0.5 MILLIGRAM(S): 2 INJECTION INTRAMUSCULAR; INTRAVENOUS; SUBCUTANEOUS at 21:30

## 2018-06-12 RX ADMIN — MINOCYCLINE HYDROCHLORIDE 100 MILLIGRAM(S): 45 TABLET, EXTENDED RELEASE ORAL at 05:12

## 2018-06-12 RX ADMIN — HYDROMORPHONE HYDROCHLORIDE 0.5 MILLIGRAM(S): 2 INJECTION INTRAMUSCULAR; INTRAVENOUS; SUBCUTANEOUS at 05:20

## 2018-06-12 RX ADMIN — MEROPENEM 100 MILLIGRAM(S): 1 INJECTION INTRAVENOUS at 14:12

## 2018-06-12 RX ADMIN — BUDESONIDE AND FORMOTEROL FUMARATE DIHYDRATE 2 PUFF(S): 160; 4.5 AEROSOL RESPIRATORY (INHALATION) at 05:27

## 2018-06-12 RX ADMIN — HYDROMORPHONE HYDROCHLORIDE 2 MILLIGRAM(S): 2 INJECTION INTRAMUSCULAR; INTRAVENOUS; SUBCUTANEOUS at 08:26

## 2018-06-12 RX ADMIN — BUDESONIDE AND FORMOTEROL FUMARATE DIHYDRATE 2 PUFF(S): 160; 4.5 AEROSOL RESPIRATORY (INHALATION) at 17:55

## 2018-06-12 RX ADMIN — Medication 1 APPLICATION(S): at 17:56

## 2018-06-12 RX ADMIN — Medication 1 TABLET(S): at 05:12

## 2018-06-12 RX ADMIN — HYDROMORPHONE HYDROCHLORIDE 0.5 MILLIGRAM(S): 2 INJECTION INTRAMUSCULAR; INTRAVENOUS; SUBCUTANEOUS at 14:21

## 2018-06-12 RX ADMIN — GABAPENTIN 200 MILLIGRAM(S): 400 CAPSULE ORAL at 05:12

## 2018-06-12 RX ADMIN — MINOCYCLINE HYDROCHLORIDE 100 MILLIGRAM(S): 45 TABLET, EXTENDED RELEASE ORAL at 17:55

## 2018-06-12 RX ADMIN — MEROPENEM 100 MILLIGRAM(S): 1 INJECTION INTRAVENOUS at 05:13

## 2018-06-12 RX ADMIN — Medication 3 MILLIGRAM(S): at 21:00

## 2018-06-12 RX ADMIN — SIMVASTATIN 20 MILLIGRAM(S): 20 TABLET, FILM COATED ORAL at 21:00

## 2018-06-12 RX ADMIN — Medication 1 TABLET(S): at 21:00

## 2018-06-12 RX ADMIN — Medication 12.5 MILLIGRAM(S): at 05:12

## 2018-06-12 RX ADMIN — Medication 12.5 MILLIGRAM(S): at 17:55

## 2018-06-12 NOTE — PROGRESS NOTE ADULT - ATTENDING COMMENTS
Reina Murphy MD  [facility]    Dr Marrero will be covering me starting   6/13 /18  Please page

## 2018-06-12 NOTE — PROGRESS NOTE ADULT - SUBJECTIVE AND OBJECTIVE BOX
[facility] PROGRESS NOTE      SUBJECTIVE: Pt seen and examined. Patient denies abdominal pain, nausea or vomiting. Tolerating regular diet and has no complaints this morning.     Vital Signs Last 24 Hrs  T(C): 36.7 (12 Jun 2018 05:05), Max: 36.9 (11 Jun 2018 14:21)  T(F): 98 (12 Jun 2018 05:05), Max: 98.4 (11 Jun 2018 14:21)  HR: 88 (12 Jun 2018 05:05) (61 - 88)  BP: 116/78 (12 Jun 2018 05:05) (109/69 - 124/74)  BP(mean): --  RR: 18 (12 Jun 2018 05:05) (18 - 18)  SpO2: 100% (12 Jun 2018 05:05) (95% - 100%)    Physical Exam  General: awake, alert, oriented, in NAD  Pulm: respirations unlabored, no increased WOB  Abdomen: soft, NT/ND, no guarding  Extremities: Grossly symmetric    I&O's Summary    10 Ludwin 2018 07:01  -  11 Jun 2018 07:00  --------------------------------------------------------  IN: 730 mL / OUT: 600 mL / NET: 130 mL    11 Jun 2018 07:01  -  12 Jun 2018 06:35  --------------------------------------------------------  IN: 140 mL / OUT: 0 mL / NET: 140 mL      I&O's Detail    10 Ludwin 2018 07:01  -  11 Jun 2018 07:00  --------------------------------------------------------  IN:    Oral Fluid: 580 mL    Solution: 150 mL  Total IN: 730 mL    OUT:    Voided: 600 mL  Total OUT: 600 mL    Total NET: 130 mL      11 Jun 2018 07:01 - 12 Jun 2018 06:35  --------------------------------------------------------  IN:    Oral Fluid: 140 mL  Total IN: 140 mL    OUT:  Total OUT: 0 mL    Total NET: 140 mL          MEDICATIONS  (STANDING):  ALPRAZolam 0.25 milliGRAM(s) Oral once  ascorbic acid 500 milliGRAM(s) Oral daily  aspirin enteric coated 81 milliGRAM(s) Oral daily  buDESOnide 160 MICROgram(s)/formoterol 4.5 MICROgram(s) Inhaler 2 Puff(s) Inhalation two times a day  calcium carbonate 1250 mG + Vitamin D (OsCal 500 + D) 1 Tablet(s) Oral three times a day  folic acid 1 milliGRAM(s) Oral daily  gabapentin 200 milliGRAM(s) Oral three times a day  melatonin 3 milliGRAM(s) Oral at bedtime  meropenem  IVPB 1000 milliGRAM(s) IV Intermittent every 8 hours  metoprolol tartrate 12.5 milliGRAM(s) Oral two times a day  minocycline 100 milliGRAM(s) Oral two times a day  multivitamin 1 Tablet(s) Oral daily  ondansetron Injectable 4 milliGRAM(s) IV Push once  pantoprazole    Tablet 40 milliGRAM(s) Oral before breakfast  polyethylene glycol 3350 17 Gram(s) Oral daily  simvastatin 20 milliGRAM(s) Oral at bedtime  tiotropium 18 MICROgram(s) Capsule 1 Capsule(s) Inhalation daily    MEDICATIONS  (PRN):  acetaminophen   Tablet. 650 milliGRAM(s) Oral every 6 hours PRN Mild Pain (1 - 3)  ALPRAZolam 0.25 milliGRAM(s) Oral daily PRN anxiety  bisacodyl Suppository 10 milliGRAM(s) Rectal daily PRN Constipation  HYDROmorphone   Tablet 2 milliGRAM(s) Oral every 3 hours PRN Moderate Pain (4 - 6)  HYDROmorphone  Injectable 0.5 milliGRAM(s) IV Push every 4 hours PRN Severe Pain (7 - 10)  senna 2 Tablet(s) Oral at bedtime PRN Constipation      LABS:                        7.5    7.29  )-----------( 247      ( 11 Jun 2018 08:12 )             25.5     06-11    138  |  99  |  26<H>  ----------------------------<  104<H>  4.2   |  29  |  0.66    Ca    9.3      11 Jun 2018 06:15      PT/INR - ( 11 Jun 2018 10:06 )   PT: 25.1 sec;   INR: 2.19 ratio

## 2018-06-12 NOTE — PROGRESS NOTE ADULT - ASSESSMENT
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  - continue vac to abdomen, continue adaptic and vac to thigh  -vac changes M/W/F.  - continue daily wet-to-dry dressing over open portion of knee wound  Follow up surgery, ortho, and plastics recs and wound care  Derm consulted-pt was seen by derm during last admission-punch bx of wound-path-inflamatory infiltrate-no e/o pyoderma  ID f/u noted- path is not c/w pyoderma  d/w wound care-surgery Dr Hollins- derm reconsulted  ?r/o pyoderma-dx of exclusion  #[facility] line care    #Pain control- dilaudid2 po q4 prn, 0.5 iv prior to dressing change  bowel regimen    # chr Anemia: stable, monitor h/h closely  #Joint infection.  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    # S/P TAVR (transcatheter aortic valve replacement). Plan: - continue Coumadin, daily INR.

## 2018-06-12 NOTE — PROGRESS NOTE ADULT - SUBJECTIVE AND OBJECTIVE BOX
Patient is a 77y old  Female who presents with a chief complaint of fever, lethargy (07 Jun 2018 22:19)      INTERVAL HPI/OVERNIGHT EVENTS:none, feels well, abd pain resolved, tolerating good po intake      Vital Signs Last 24 Hrs  T(C): 36.4 (12 Jun 2018 10:15), Max: 36.9 (11 Jun 2018 14:21)  T(F): 97.6 (12 Jun 2018 10:15), Max: 98.4 (11 Jun 2018 14:21)  HR: 61 (12 Jun 2018 10:15) (61 - 88)  BP: 138/74 (12 Jun 2018 10:15) (109/69 - 138/74)  BP(mean): --  RR: 18 (12 Jun 2018 10:15) (18 - 18)  SpO2: 97% (12 Jun 2018 10:15) (95% - 100%)    acetaminophen   Tablet. 650 milliGRAM(s) Oral every 6 hours PRN  ALPRAZolam 0.25 milliGRAM(s) Oral daily PRN  ALPRAZolam 0.25 milliGRAM(s) Oral once  ascorbic acid 500 milliGRAM(s) Oral daily  aspirin enteric coated 81 milliGRAM(s) Oral daily  bisacodyl Suppository 10 milliGRAM(s) Rectal daily PRN  buDESOnide 160 MICROgram(s)/formoterol 4.5 MICROgram(s) Inhaler 2 Puff(s) Inhalation two times a day  calcium carbonate 1250 mG + Vitamin D (OsCal 500 + D) 1 Tablet(s) Oral three times a day  Dakins Solution - 1/4 Strength 1 Application(s) Topical two times a day  folic acid 1 milliGRAM(s) Oral daily  gabapentin 200 milliGRAM(s) Oral three times a day  HYDROmorphone   Tablet 2 milliGRAM(s) Oral every 3 hours PRN  HYDROmorphone  Injectable 0.5 milliGRAM(s) IV Push every 4 hours PRN  melatonin 3 milliGRAM(s) Oral at bedtime  meropenem  IVPB 1000 milliGRAM(s) IV Intermittent every 8 hours  metoprolol tartrate 12.5 milliGRAM(s) Oral two times a day  minocycline 100 milliGRAM(s) Oral two times a day  multivitamin 1 Tablet(s) Oral daily  ondansetron Injectable 4 milliGRAM(s) IV Push once  pantoprazole    Tablet 40 milliGRAM(s) Oral before breakfast  polyethylene glycol 3350 17 Gram(s) Oral daily  senna 2 Tablet(s) Oral at bedtime PRN  simvastatin 20 milliGRAM(s) Oral at bedtime  tiotropium 18 MICROgram(s) Capsule 1 Capsule(s) Inhalation daily  warfarin 0.5 milliGRAM(s) Oral once      PHYSICAL EXAM:  GENERAL: NAD, obese  EYES: conjunctiva and sclera clear  ENMT: Moist mucous membranes  NECK: Supple, No JVD, Normal thyroid  NERVOUS SYSTEM:  Alert & Oriented X3,   CHEST/LUNG: Clear to auscultation bilaterally; No rales, rhonchi, wheezing, or rubs  HEART: Regular rate and rhythm; No murmurs, rubs, or gallops  ABDOMEN: Soft, abd wall wound vac,  EXTREMITIES:  rt knee dressing, rt prox thigh vac, dressing lt thigh and sacral wound  LYMPH: No lymphadenopathy noted  SKIN: No rashes or lesions    Consultant(s) Notes Reviewed:  [x ] YES  [ ] NO  Care Discussed with Consultants/Other Providers [ x] YES  [ ] NO    LABS:                        7.8    7.70  )-----------( 280      ( 12 Jun 2018 07:45 )             27.7     06-12    139  |  102  |  25<H>  ----------------------------<  99  4.3   |  29  |  0.57    Ca    9.3      12 Jun 2018 06:24      PT/INR - ( 12 Jun 2018 07:48 )   PT: 23.8 sec;   INR: 2.08 ratio             CAPILLARY BLOOD GLUCOSE                RADIOLOGY & ADDITIONAL TESTS:    Imaging Personally Reviewed:  [ x] YES  [ ] NO

## 2018-06-12 NOTE — PROGRESS NOTE ADULT - SUBJECTIVE AND OBJECTIVE BOX
CC: f/u for appendicitis    Patient reports  she has no abdomen pain  REVIEW OF SYSTEMS:  All other review of systems negative (Comprehensive ROS)    Antimicrobials Day #  :5/10  meropenem  IVPB 1000 milliGRAM(s) IV Intermittent every 8 hours  minocycline 100 milliGRAM(s) Oral two times a day    Other Medications Reviewed    T(F): 97.6 (06-12-18 @ 10:15), Max: 98 (06-12-18 @ 05:05)  HR: 61 (06-12-18 @ 10:15)  BP: 138/74 (06-12-18 @ 10:15)  RR: 18 (06-12-18 @ 10:15)  SpO2: 97% (06-12-18 @ 10:15)  Wt(kg): --    PHYSICAL EXAM:  General: alert, no acute distress  Eyes:  anicteric, no conjunctival injection, no discharge  Oropharynx: no lesions or injection 	  Neck: supple, without adenopathy  Lungs: clear to auscultation  Heart: s1s2 2/6 sys m  Abdomen: soft, nondistended, nontender, without mass or organomegaly, vac over lower pannus  Skin: no lesions  Extremities: right thigh with vac. right knee dressed  Neurologic: alert, oriented, moves all extremities    LAB RESULTS:                        7.8    7.70  )-----------( 280      ( 12 Jun 2018 07:45 )             27.7     06-12    139  |  102  |  25<H>  ----------------------------<  99  4.3   |  29  |  0.57    Ca    9.3      12 Jun 2018 06:24          MICROBIOLOGY:  RECENT CULTURES:  06-07 @ 19:48 .Blood Blood     No growth to date.      06-07 @ 18:40 .Urine Catheterized     No growth      06-07 @ 16:57 .Blood Blood     No growth to date.          RADIOLOGY REVIEWED:    < from: CT Abdomen and Pelvis w/ IV Cont (06.07.18 @ 15:06) >  IMPRESSION:   Findings most suggestive of tip appendicitis.  Skin defect of the lower anterior abdominal wall out associated fluid   collection.  No acute abnormality within the chest.    Findings discussed with DR. VAZQUEZ in the ER on 6/7/2018 5:40 PM with   read back.        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   Plan: 5 more days of meropenem for appendicitis  continue lifelong minocycline

## 2018-06-12 NOTE — PROGRESS NOTE ADULT - ASSESSMENT
Tip appendicitis, continue treatment w Meropenem. No plan for surgery at this point.    Continue diet as tolerated.    Please call [facility] with any questions #[phone]

## 2018-06-13 ENCOUNTER — RESULT REVIEW (OUTPATIENT)
Age: 78
End: 2018-06-13

## 2018-06-13 LAB
ANION GAP SERPL CALC-SCNC: 10 MMOL/L — SIGNIFICANT CHANGE UP (ref 5–17)
BUN SERPL-MCNC: 22 MG/DL — SIGNIFICANT CHANGE UP (ref 7–23)
CALCIUM SERPL-MCNC: 9.4 MG/DL — SIGNIFICANT CHANGE UP (ref 8.4–10.5)
CHLORIDE SERPL-SCNC: 102 MMOL/L — SIGNIFICANT CHANGE UP (ref 96–108)
CO2 SERPL-SCNC: 29 MMOL/L — SIGNIFICANT CHANGE UP (ref 22–31)
CREAT SERPL-MCNC: 0.54 MG/DL — SIGNIFICANT CHANGE UP (ref 0.5–1.3)
GLUCOSE SERPL-MCNC: 91 MG/DL — SIGNIFICANT CHANGE UP (ref 70–99)
GRAM STN FLD: SIGNIFICANT CHANGE UP
HCT VFR BLD CALC: 27.7 % — LOW (ref 34.5–45)
HGB BLD-MCNC: 7.9 G/DL — LOW (ref 11.5–15.5)
INR BLD: 1.78 RATIO — HIGH (ref 0.88–1.16)
MCHC RBC-ENTMCNC: 26 PG — LOW (ref 27–34)
MCHC RBC-ENTMCNC: 28.5 GM/DL — LOW (ref 32–36)
MCV RBC AUTO: 91.1 FL — SIGNIFICANT CHANGE UP (ref 80–100)
PLATELET # BLD AUTO: 283 K/UL — SIGNIFICANT CHANGE UP (ref 150–400)
POTASSIUM SERPL-MCNC: 4.6 MMOL/L — SIGNIFICANT CHANGE UP (ref 3.5–5.3)
POTASSIUM SERPL-SCNC: 4.6 MMOL/L — SIGNIFICANT CHANGE UP (ref 3.5–5.3)
PROTHROM AB SERPL-ACNC: 20.3 SEC — HIGH (ref 10–13.1)
RBC # BLD: 3.04 M/UL — LOW (ref 3.8–5.2)
RBC # FLD: 18.4 % — HIGH (ref 10.3–14.5)
SODIUM SERPL-SCNC: 141 MMOL/L — SIGNIFICANT CHANGE UP (ref 135–145)
SPECIMEN SOURCE: SIGNIFICANT CHANGE UP
WBC # BLD: 8.25 K/UL — SIGNIFICANT CHANGE UP (ref 3.8–10.5)
WBC # FLD AUTO: 8.25 K/UL — SIGNIFICANT CHANGE UP (ref 3.8–10.5)

## 2018-06-13 PROCEDURE — 99232 SBSQ HOSP IP/OBS MODERATE 35: CPT | Mod: 25

## 2018-06-13 PROCEDURE — 97605 NEG PRS WND THER DME<=50SQCM: CPT

## 2018-06-13 RX ORDER — WARFARIN SODIUM 2.5 MG/1
1 TABLET ORAL ONCE
Qty: 0 | Refills: 0 | Status: COMPLETED | OUTPATIENT
Start: 2018-06-13 | End: 2018-06-13

## 2018-06-13 RX ADMIN — Medication 500 MILLIGRAM(S): at 12:29

## 2018-06-13 RX ADMIN — WARFARIN SODIUM 1 MILLIGRAM(S): 2.5 TABLET ORAL at 22:00

## 2018-06-13 RX ADMIN — HYDROMORPHONE HYDROCHLORIDE 2 MILLIGRAM(S): 2 INJECTION INTRAMUSCULAR; INTRAVENOUS; SUBCUTANEOUS at 20:17

## 2018-06-13 RX ADMIN — HYDROMORPHONE HYDROCHLORIDE 0.5 MILLIGRAM(S): 2 INJECTION INTRAMUSCULAR; INTRAVENOUS; SUBCUTANEOUS at 05:15

## 2018-06-13 RX ADMIN — GABAPENTIN 200 MILLIGRAM(S): 400 CAPSULE ORAL at 22:00

## 2018-06-13 RX ADMIN — Medication 1 TABLET(S): at 12:30

## 2018-06-13 RX ADMIN — Medication 1 APPLICATION(S): at 06:55

## 2018-06-13 RX ADMIN — Medication 81 MILLIGRAM(S): at 12:29

## 2018-06-13 RX ADMIN — HYDROMORPHONE HYDROCHLORIDE 2 MILLIGRAM(S): 2 INJECTION INTRAMUSCULAR; INTRAVENOUS; SUBCUTANEOUS at 12:25

## 2018-06-13 RX ADMIN — TIOTROPIUM BROMIDE 1 CAPSULE(S): 18 CAPSULE ORAL; RESPIRATORY (INHALATION) at 12:30

## 2018-06-13 RX ADMIN — Medication 3 MILLIGRAM(S): at 22:00

## 2018-06-13 RX ADMIN — HYDROMORPHONE HYDROCHLORIDE 0.5 MILLIGRAM(S): 2 INJECTION INTRAMUSCULAR; INTRAVENOUS; SUBCUTANEOUS at 04:45

## 2018-06-13 RX ADMIN — MEROPENEM 100 MILLIGRAM(S): 1 INJECTION INTRAVENOUS at 22:00

## 2018-06-13 RX ADMIN — GABAPENTIN 200 MILLIGRAM(S): 400 CAPSULE ORAL at 18:35

## 2018-06-13 RX ADMIN — BUDESONIDE AND FORMOTEROL FUMARATE DIHYDRATE 2 PUFF(S): 160; 4.5 AEROSOL RESPIRATORY (INHALATION) at 18:36

## 2018-06-13 RX ADMIN — SIMVASTATIN 20 MILLIGRAM(S): 20 TABLET, FILM COATED ORAL at 22:00

## 2018-06-13 RX ADMIN — Medication 12.5 MILLIGRAM(S): at 18:35

## 2018-06-13 RX ADMIN — GABAPENTIN 200 MILLIGRAM(S): 400 CAPSULE ORAL at 06:57

## 2018-06-13 RX ADMIN — MINOCYCLINE HYDROCHLORIDE 100 MILLIGRAM(S): 45 TABLET, EXTENDED RELEASE ORAL at 18:36

## 2018-06-13 RX ADMIN — POLYETHYLENE GLYCOL 3350 17 GRAM(S): 17 POWDER, FOR SOLUTION ORAL at 12:29

## 2018-06-13 RX ADMIN — Medication 1 TABLET(S): at 06:57

## 2018-06-13 RX ADMIN — MEROPENEM 100 MILLIGRAM(S): 1 INJECTION INTRAVENOUS at 18:35

## 2018-06-13 RX ADMIN — BUDESONIDE AND FORMOTEROL FUMARATE DIHYDRATE 2 PUFF(S): 160; 4.5 AEROSOL RESPIRATORY (INHALATION) at 06:57

## 2018-06-13 RX ADMIN — MINOCYCLINE HYDROCHLORIDE 100 MILLIGRAM(S): 45 TABLET, EXTENDED RELEASE ORAL at 06:57

## 2018-06-13 RX ADMIN — PANTOPRAZOLE SODIUM 40 MILLIGRAM(S): 20 TABLET, DELAYED RELEASE ORAL at 06:57

## 2018-06-13 RX ADMIN — Medication 1 MILLIGRAM(S): at 12:30

## 2018-06-13 RX ADMIN — Medication 1 TABLET(S): at 22:00

## 2018-06-13 RX ADMIN — Medication 1 APPLICATION(S): at 18:22

## 2018-06-13 RX ADMIN — HYDROMORPHONE HYDROCHLORIDE 2 MILLIGRAM(S): 2 INJECTION INTRAMUSCULAR; INTRAVENOUS; SUBCUTANEOUS at 12:55

## 2018-06-13 RX ADMIN — MEROPENEM 100 MILLIGRAM(S): 1 INJECTION INTRAVENOUS at 06:57

## 2018-06-13 RX ADMIN — Medication 1 TABLET(S): at 12:29

## 2018-06-13 RX ADMIN — HYDROMORPHONE HYDROCHLORIDE 2 MILLIGRAM(S): 2 INJECTION INTRAMUSCULAR; INTRAVENOUS; SUBCUTANEOUS at 20:38

## 2018-06-13 RX ADMIN — Medication 12.5 MILLIGRAM(S): at 06:57

## 2018-06-13 NOTE — PROGRESS NOTE ADULT - SUBJECTIVE AND OBJECTIVE BOX
delayed entry    Pt's daughter & HHA at bedside.  Pt was premedicated.    Pt overall improving otherwise- abx for appendicitis  Pt seen w/ ID attng  S/p Derm bx- awaiting path     No odor, redness, warmth, f/c/s noted  drainage managed by dressings	    ROS skin / msk see HPI   all other systems negative      aspirin enteric coated 81 milliGRAM(s) Oral daily  minocycline 100 milliGRAM(s) Oral two times a day  warfarin 1.5 milliGRAM(s) Oral once        General Appearance:    T(F): 97.8 (06-13-18 @ 14:47), Max: 98.5 (06-13-18 @ 01:44)  HR: 68 (06-13-18 @ 14:47)  BP: 109/72 (06-13-18 @ 14:47)  RR: 18 (06-13-18 @ 14:47)  SpO2: 96% (06-13-18 @ 14:47)  Wt(kg): --    NAD /  A&Ox3  MO/ frail/ Disheveled  Envella    Musculoskeletal/Vascular:  BLE edema  BLE equally warm no acute ischemia noted  no deformities/ contractures  Rt knee wound per plastics    Skin:  dry, frail,  ecchymosis w/o hematoma    pt is tender when just gently touching skin- therefore pt was premedicated prior to assessment  Multiple wounds- see measurements in A&I sections- placed by wound PT    RLQ abdomen wound w/ moist & granular tissue no odor  small amount of nonviable tissue on the right lateral wall loosely adherent    Rt anterior thigh wound w/ moist & granular tissue no odor  (+)serosanguinous drainage & tender  No erythema, odor, increased warmth, induration, fluctuance    Lt lateral thigh wound  w/pale moist & granular  tissue   (+)serosanguinous  drainage  (+)tender    no odor  No erythema, increased warmth, induration, fluctuance    Lt lateral posterior thigh (inferior)  Partial thickness moist granular wound  (+)serosanguinous drainage & tender  No erythema,  odor, increased warmth, induration, fluctuance    Rt superior posterolateral w/ new upper thigh small wound opened w/ liquifaction necrosis drainage  wound irrigated clear w/ NS irrigation  (+)tender   w/faint odor  (+) liquefaction necrosis drainage  No erythema, increased warmth, induration, fluctuance    Bilateral buttocks (ot over glenys prominences) now w/ <1cm areas of purple hyperpigmentation  BLE thighs w/ small <1cm firmly attached dry eschars w/o signs of infection  No drainage  No odor, erythema, increased warmth, tenderness, induration, fluctuance    LAB RESULTS:                        7.9    8.25  )-----------( 283      ( 13 Jun 2018 07:41 )             27.7     06-13    141  |  102  |  22  ----------------------------<  91  4.6   |  29  |  0.54    Ca    9.4      13 Jun 2018 06:24

## 2018-06-13 NOTE — PROGRESS NOTE ADULT - ASSESSMENT
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    Multiple wounds Abdomen Bilateral thighs w/ new wounds on buttocks- no gross signs of infection    VAC to RLQ abdominal wound and Rt anterior thigh & Lt lateral thigh wounds    Dakins packing BID to Lateral Rt thigh/ medial Lt thigh wounds    Cavilon to Buttocks and Lt Lt thigh wounds  Rt knee w/ skin dehiscence - per plastics and ortho  DERM & Pain Consult appreciated  bx pending  Abx per Medicine/ ID  Gen SUrg/ Plastics/ Ortho consults appreciated  con't offloading  con't Nuturition  con't Pericare  Con't per Medicine  F/u as outpatient in [facility] [address] [facility] [phone]  s/w team and d/w attending  Janki Cramer PA-C [id number]  I spent  30 minutes w/ this pt of which more than 50% of the time was spent counseling & coordinating care of this pt.

## 2018-06-13 NOTE — BEHAVIORAL HEALTH ASSESSMENT NOTE - DESCRIPTION
appendicitis, TKR c/b joint infection, CAD s/p HERBERT, severe AS (s/p TAVR & PPM 12/17 [employer] Model [id number]), h/o MVR, PMR on chronic steroids, asthma, OA, HLD, GERD, Anemia, thigh hematoma with fat necrosis, chronic RLE wound

## 2018-06-13 NOTE — PROGRESS NOTE ADULT - SUBJECTIVE AND OBJECTIVE BOX
Patient is a 77y old  Female who presents with a chief complaint of fever, lethargy (07 Jun 2018 22:19)      SUBJECTIVE / OVERNIGHT EVENTS:    Patient seen and examined. co pain all over, especially right leg. afebrile. denies cp sob.      Vital Signs Last 24 Hrs  T(C): 36.6 (13 Jun 2018 06:55), Max: 37 (12 Jun 2018 14:57)  T(F): 97.9 (13 Jun 2018 06:55), Max: 98.6 (12 Jun 2018 14:57)  HR: 62 (13 Jun 2018 06:55) (61 - 80)  BP: 122/69 (13 Jun 2018 06:55) (118/74 - 141/76)  BP(mean): --  RR: 18 (13 Jun 2018 06:55) (18 - 18)  SpO2: 99% (13 Jun 2018 06:55) (97% - 99%)  I&O's Summary    12 Jun 2018 07:01  -  13 Jun 2018 07:00  --------------------------------------------------------  IN: 1350 mL / OUT: 250 mL / NET: 1100 mL        PE:  GENERAL: NAD, AAOx3, obese  HEAD:  Atraumatic, Normocephalic  EYES: EOMI, PERRLA, conjunctiva and sclera clear  NECK: Supple, No JVD  CHEST/LUNG: CTABL, No wheeze  HEART: Regular rate and rhythm; no murmur  ABDOMEN: Soft, Nontender, Nondistended; Bowel sounds present  EXTREMITIES:  2+ Peripheral Pulses, right knee eschar, abd vac and right thigh vac  SKIN: No rashes or lesions  NEURO: No focal deficits    LABS:                        7.9    8.25  )-----------( 283      ( 13 Jun 2018 07:41 )             27.7     06-13    141  |  102  |  22  ----------------------------<  91  4.6   |  29  |  0.54    Ca    9.4      13 Jun 2018 06:24      PT/INR - ( 12 Jun 2018 07:48 )   PT: 23.8 sec;   INR: 2.08 ratio           CAPILLARY BLOOD GLUCOSE                RADIOLOGY & ADDITIONAL TESTS:    Imaging Personally Reviewed:  [x] YES  [ ] NO    Consultant(s) Notes Reviewed:  [x] YES  [ ] NO    MEDICATIONS  (STANDING):  ALPRAZolam 0.25 milliGRAM(s) Oral once  ascorbic acid 500 milliGRAM(s) Oral daily  aspirin enteric coated 81 milliGRAM(s) Oral daily  buDESOnide 160 MICROgram(s)/formoterol 4.5 MICROgram(s) Inhaler 2 Puff(s) Inhalation two times a day  calcium carbonate 1250 mG + Vitamin D (OsCal 500 + D) 1 Tablet(s) Oral three times a day  Dakins Solution - 1/4 Strength 1 Application(s) Topical two times a day  folic acid 1 milliGRAM(s) Oral daily  gabapentin 200 milliGRAM(s) Oral three times a day  melatonin 3 milliGRAM(s) Oral at bedtime  meropenem  IVPB 1000 milliGRAM(s) IV Intermittent every 8 hours  metoprolol tartrate 12.5 milliGRAM(s) Oral two times a day  minocycline 100 milliGRAM(s) Oral two times a day  multivitamin 1 Tablet(s) Oral daily  ondansetron Injectable 4 milliGRAM(s) IV Push once  pantoprazole    Tablet 40 milliGRAM(s) Oral before breakfast  polyethylene glycol 3350 17 Gram(s) Oral daily  simvastatin 20 milliGRAM(s) Oral at bedtime  tiotropium 18 MICROgram(s) Capsule 1 Capsule(s) Inhalation daily    MEDICATIONS  (PRN):  acetaminophen   Tablet. 650 milliGRAM(s) Oral every 6 hours PRN Mild Pain (1 - 3)  ALPRAZolam 0.25 milliGRAM(s) Oral daily PRN anxiety  bisacodyl Suppository 10 milliGRAM(s) Rectal daily PRN Constipation  HYDROmorphone   Tablet 2 milliGRAM(s) Oral every 3 hours PRN Moderate Pain (4 - 6)  HYDROmorphone  Injectable 0.5 milliGRAM(s) IV Push every 4 hours PRN Severe Pain (7 - 10)  senna 2 Tablet(s) Oral at bedtime PRN Constipation      Care Discussed with Consultants/Other Providers [x] YES  [ ] NO    HEALTH ISSUES - PROBLEM Dx:  Joint infection: Joint infection  Other problems related to medical facilities and other health care: Other problems related to medical facilities and other health care  S/P TAVR (transcatheter aortic valve replacement): S/P TAVR (transcatheter aortic valve replacement)  Acute cystitis without hematuria: Acute cystitis without hematuria  Wound infection: Wound infection  Acute appendicitis, unspecified acute appendicitis type: Acute appendicitis, unspecified acute appendicitis type  Fever, unspecified fever cause: Fever, unspecified fever cause

## 2018-06-13 NOTE — BEHAVIORAL HEALTH ASSESSMENT NOTE - NSBHCHARTREVIEWLAB_PSY_A_CORE FT
7.9    8.25  )-----------( 283      ( 13 Jun 2018 07:41 )             27.7   06-13    141  |  102  |  22  ----------------------------<  91  4.6   |  29  |  0.54    Ca    9.4      13 Jun 2018 06:24

## 2018-06-13 NOTE — PROGRESS NOTE ADULT - ASSESSMENT
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 [employer] Model [id number], MVR, 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 with recent admission from (3/9/2018 to 6/6/2018) for TKR c/b joint infection s/p explantation with multiple wounds managed by plastic surgery who presents from rehab today for evaluation of fever, lethargy, and nausea at rehab found to be febrile here with evidence of appendicitis on CT abdomen, non-operative candidate,     # Fever 2/2 acute appendicitis  Cont Meropenem via picc  bcx and ucx ngtd  Acute appendicitis: not a candidate-for sx  ID c/s appreciated    # Wound infection Patient has multiple wounds on R thigh as well as mid-abdomen  continue vac to abdomen, continue adaptic and vac to thigh  vac changes M/W/F  continue daily wet-to-dry dressing over open portion of knee wound  pain control  Follow up surgery, ortho, and plastics recs and wound care  Derm consulted-pt was seen by derm during last admission-punch bx of wound-path-inflamatory infiltrate-no e/o pyoderma  ID f/u noted- path is not c/w pyoderma  d/w wound care-surgery Dr Hollins- derm reconsulted  ?r/o pyoderma-dx of exclusion  chronic minocycline    #Pain control  dilaudid2 po q4 prn, 0.5 iv prior to dressing change  bowel regimen    # chr Anemia: stable, monitor h/h closely    # S/P TAVR (transcatheter aortic valve replacement)  continue Coumadin, daily INR 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 [place] [employer] Model [id number], MVR, 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 with recent admission from (3/9/2018 to 6/6/2018) for TKR c/b joint infection s/p explantation with multiple wounds managed by plastic surgery who presents from rehab today for evaluation of fever, lethargy, and nausea at rehab found to be febrile here with evidence of appendicitis on CT abdomen, non-operative candidate,     # Fever 2/2 acute appendicitis  Cont Meropenem via picc  bcx and ucx ngtd  Acute appendicitis: not a candidate-for sx  ID c/s appreciated    # Wound infection Patient has multiple wounds on R thigh as well as mid-abdomen  continue vac to abdomen, continue adaptic and vac to thigh  vac changes M/W/F  continue daily wet-to-dry dressing over open portion of knee wound  pain control  Follow up surgery, ortho, and plastics recs and wound care  Derm consulted-pt was seen by derm during last admission-punch bx of wound-path-inflamatory infiltrate-no e/o pyoderma  ID f/u noted- path is not c/w pyoderma  d/w wound care-surgery Dr Hollins- derm reconsulted  ?r/o pyoderma-dx of exclusion  chronic minocycline    #Pain control  dilaudid2 po q4 prn, 0.5 iv prior to dressing change  bowel regimen    # chr Anemia: stable, monitor h/h closely    # S/P TAVR (transcatheter aortic valve replacement)  continue Coumadin, daily INR    # adjustment disorder with depressed mood  psych re-eval if patient agrees

## 2018-06-13 NOTE — PROGRESS NOTE ADULT - SUBJECTIVE AND OBJECTIVE BOX
pain controlled, emotional again    abdominal wound - vac  RLE  thigh - vac  knee wound with stable fibrinous base, dressed by prs  5/5 ta/ehl/gcs  silt l4-s1  2+ dp pulse

## 2018-06-13 NOTE — BEHAVIORAL HEALTH ASSESSMENT NOTE - NSBHSUICRISKFACTOR_PSY_A_CORE
Perceived burden on family and others/Chronic pain or acute medical issue Chronic pain or acute medical issue

## 2018-06-13 NOTE — PROGRESS NOTE ADULT - SUBJECTIVE AND OBJECTIVE BOX
Pt seen and examined. Doing well. No acute events o/n.     T(C): 36.9 (06-13-18 @ 01:44), Max: 37 (06-12-18 @ 14:57)  T(F): 98.5 (06-13-18 @ 01:44), Max: 98.6 (06-12-18 @ 14:57)  HR: 62 (06-13-18 @ 01:44) (61 - 80)  BP: 123/77 (06-13-18 @ 01:44) (118/74 - 141/76)  RR: 18 (06-13-18 @ 01:44) (18 - 18)  SpO2: 99% (06-13-18 @ 01:44) (97% - 99%)      11 Jun 2018 07:01  -  12 Jun 2018 07:00  --------------------------------------------------------  IN:    Oral Fluid: 380 mL    Solution: 100 mL  Total IN: 480 mL    OUT:  Total OUT: 0 mL    Total NET: 480 mL      12 Jun 2018 07:01  -  13 Jun 2018 06:36  --------------------------------------------------------  IN:    Oral Fluid: 960 mL    Solution: 50 mL  Total IN: 1010 mL    OUT:    Voided: 250 mL  Total OUT: 250 mL    Total NET: 760 mL          Exam:  VACs in place, holding suction.  Knee with granulation tissue at base                            7.8    7.70  )-----------( 280      ( 12 Jun 2018 07:45 )             27.7     06-12    139  |  102  |  25<H>  ----------------------------<  99  4.3   |  29  |  0.57    Ca    9.3      12 Jun 2018 06:24

## 2018-06-13 NOTE — BEHAVIORAL HEALTH ASSESSMENT NOTE - RISK ASSESSMENT
Acute risk factors for self-harm include depressed mood and tearfulness in the context of chronic unstable medical condition resulting in pain and disability. No e/o acute mood episodes, psychosis, remigio, or ongoing substance intoxication. Patient denies active suicidal intent/plan and names multiple protective factors, specifically supportive and loving family. Not currently meeting criteria for psychiatric inpatient hospitalization.

## 2018-06-13 NOTE — BEHAVIORAL HEALTH ASSESSMENT NOTE - CASE SUMMARY
Pt is a 76 y/o WF with multiple medical problems, re-admitted for appendicitis after being discharged from the hospital for SALAZAR. Pt seen, no si/hi, admits to mild depression secondary to ongoing medical issues, but denies feeling hopeless and helpless. She denies manic or psychotic symptoms. Pt not interested in taking any anti-depressants at this time. Supportive therapy provided.

## 2018-06-13 NOTE — BEHAVIORAL HEALTH ASSESSMENT NOTE - AXIS III
TKR c/b joint infection, CAD s/p HERBERT, severe AS (s/p TAVR & PPM 12/17 [place] [employer] Model [id number]), h/o MVR, PMR on chronic steroids, asthma, OA, HLD, GERD, Anemia, thigh hematoma with fat necrosis, chronic RLE wound, appendicitis

## 2018-06-13 NOTE — PROGRESS NOTE ADULT - SUBJECTIVE AND OBJECTIVE BOX
CC: f/u for  appendicitis and infected right prosthetic knee  Patient reports    REVIEW OF SYSTEMS:  All other review of systems negative (Comprehensive ROS)    Antimicrobials Day #  :6/10  meropenem  IVPB 1000 milliGRAM(s) IV Intermittent every 8 hours  minocycline 100 milliGRAM(s) Oral two times a day    Other Medications Reviewed    T(F): 97.8 (06-13-18 @ 14:47), Max: 98.5 (06-13-18 @ 01:44)  HR: 68 (06-13-18 @ 14:47)  BP: 109/72 (06-13-18 @ 14:47)  RR: 18 (06-13-18 @ 14:47)  SpO2: 96% (06-13-18 @ 14:47)  Wt(kg): --    PHYSICAL EXAM:  General: alert, no acute distress  Eyes:  anicteric, no conjunctival injection, no discharge  Oropharynx: no lesions or injection 	  Neck: supple, without adenopathy  Lungs: clear to auscultation  Heart: s1s2 2/6 sys m  Abdomen: soft, nondistended, nontender, without mass or organomegaly. abdomen wound deep but clean.   Skin: no lesions  Extremities: no clubbing,. right thigh wound clean and superficial . right knee wound is clean and appears to be healing.   Neurologic: alert, oriented, moves all extremities    LAB RESULTS:                        7.9    8.25  )-----------( 283      ( 13 Jun 2018 07:41 )             27.7     06-13    141  |  102  |  22  ----------------------------<  91  4.6   |  29  |  0.54    Ca    9.4      13 Jun 2018 06:24          RADIOLOGY REVIEWED:    < from: CT Chest w/ IV Cont (06.07.18 @ 15:06) >  XAM:  CT ABDOMEN AND PELVIS IC                          EXAM:  CT CHEST IC                            PROCEDURE DATE:  06/07/2018            INTERPRETATION:  CLINICAL INFORMATION: Shortness of breath and nausea.    COMPARISON: 4/19/2018 and 4/9/2018.     PROCEDURE:   CT of the Chest, Abdomen and Pelvis was performed with intravenous   contrast.   Intravenous contrast: 90 ml Omnipaque 350. 10 ml discarded.  Oral contrast: None.  Sagittal and coronal reformats were performed.    FINDINGS:    CHEST:     LUNGS AND LARGE AIRWAYS: Patent central airways. Clear lungs.  PLEURA: No pleural effusion.       VESSELS: Post surgical changes in the ascending aorta. Mild   calcifications of thoracic aorta.  MEDIASTINUM: Heart size is normal. No pericardial effusion. No   mediastinal, hilar, axillary or supraclavicular lymphadenopathy. ICD   leads are noted.  CHEST WALL AND LOWER NECK: Within normal limits.        ABDOMEN AND PELVIS:    LIVER: No focal lesion.      BILE DUCTS: Normal caliber.  GALLBLADDER: Surgically absent.  SPLEEN: Multiple indeterminate low-attenuation lesions, unchanged since   12/12/2016.  PANCREAS: Unremarkable.  ADRENALS: Unremarkable.  KIDNEYS/URETERS: No hydronephrosis.  Left renal cyst in the upper pole. A   complex enhancing lesion in the lower pole of left kidney, containing   minimal fat, unchanged since 12/12/2016.    BLADDER: Within normal limits.  REPRODUCTIVE ORGANS: Hysterectomy. No adnexal mass.    BOWEL: Normal in course and caliber without obstruction. The appendiceal   tip is distended with fluid measuring up to 10 mm with adjacent fatty   stranding.  PERITONEUM/RETROPERITONEUM: No pneumoperitoneum, ascites, or   lymphadenopathy.  VESSELS:  No evidence of aortic aneurysm. Moderate vascular   calcifications. Infrarenal IVC filter.  BONES/SOFT TISSUES: Changes of subcutaneous injections in the anterior   abdominal wall. (Skin defect in the lower anterior abdominal wall without   associated collection..      IMPRESSION:   Findings most suggestive of tip appendicitis.  Skin defect of the lower anterior abdominal wall out associated fluid   collection.  No acute abnormality within the chest.    Findings discussed with DR. VAZQUEZ in the ER on 6/7/2018 5:40 PM with   read back.    < end of copied text >      Assessment:  Patient with nonhealed right tkr wound after spacer change for nonhealing wound after rudy and spacer placement for strept prosthetic knee infection remains on suppressive minocycline who returns to hospital with acute appendicitis managed medically. Continues to have wounds at sites of fat necrosis but they are not infected  Plan: 4 more days of meropenem  continue lifetime minocycline  Continue local wound care to the wounds

## 2018-06-13 NOTE — BEHAVIORAL HEALTH ASSESSMENT NOTE - NSBHCONSULTRECOMMENDOTHER_PSY_A_CORE FT
1. Continue previous prescribed psychotropics: gabapentin 200mg po tid, melatonin 3mg po hs, and Xanax 0.25mg po prn daily  2. [facility] psychiatry will continue to follow 1) No standing psychiatric medications recommended at this time.  2) Continue to recommend melatonin 3mg po hs, gabapentin 200mg po tid and Xanax 0.25mg po prn daily  3) supportive therapy provided

## 2018-06-13 NOTE — BEHAVIORAL HEALTH ASSESSMENT NOTE - SUMMARY
76 y/o  F  with 3 adult children and 8 grandchildren, with charted PPHx of anxiety, with PMHx of TKR c/b joint infection, CAD s/p HERBERT, severe AS (s/p TAVR & PPM 12/17 [place] [employer] Model [id number]), h/o MVR, PMR on chronic steroids, asthma, OA, HLD, GERD, Anemia, thigh hematoma with fat necrosis, chronic RLE wound, originally p/w fever on 4/8, found to be septic, [facility] Psych initially following patient on previous admission tearfulness/low mood was discharged last week to rehab and was readmitted medically for appendicitis.  Psychiatry called today to follow-up for continued tearfulness and low mood in the context of prolonged hospitalization. Pt presents as calm and at times making jokes and smiling, reportedly tearful according to daughter at bedside, in the context of prolonged hospitalization, disability, and pain. Denying other symptoms of depression, active suicidal intent/plan, anxiety, AH/VH, and fear of harm/paranoia. A&Ox4/4. Future oriented reports she wants to get better so she can return home and help her grand daughter plan her wedding.  Presence of supportive familial relationships. Supportive psychotherapy provided at bedside. No indication for pharmacotherapy at this time given risks and unlikely benefit for adjustment d/o.  Patient is not receptive to starting any new medications at this time.

## 2018-06-13 NOTE — BEHAVIORAL HEALTH ASSESSMENT NOTE - HPI (INCLUDE ILLNESS QUALITY, SEVERITY, DURATION, TIMING, CONTEXT, MODIFYING FACTORS, ASSOCIATED SIGNS AND SYMPTOMS)
77 y.o. woman with PMHx of Total Knee Replacement with recent joint infection (s/p explant and abx spacer on 12/23/17, + rehab when had RLE DVT now on Coumadin s/p 3/23/2018 Right knee explantation of previously placed articulating antibiotic spacer, irrigation and debridement of the knee, placement of static antibiotic spacer, with a Plastic Surgery soft tissue complex wound closure), CAD s/p HERBERT, severe AS (s/p TAVR & PPM 12/17 [place] [employer] Model [id number]), h/o MVR, PMR on chronic steroids, asthma, OA, HLD, GERD, Anxiety, Anemia, treated for sepsis (fever, leukocytosis and presumed source of infection) was hospitalized and discharged last week to rehab, was readmitted back to hospital for appendicitis.  Psychiatry consulted to assess patient for depression.  Per ordering NP, patient has been weepy and tearful.      [facility] Psych initially following patient on previous admission tearfulness/low mood was discharged last week to rehab and was readmitted medically for appendicitis.  Pt presents as calm and at times making jokes and smiling, reportedly tearful earlier in the day according to daughter at bedside, in the context of prolonged hospitalization, disability, and pain. Denying other symptoms of depression, denies feelings of hopelessness or helplessness, active suicidal intent/plan, anxiety, AH/VH, and fear of harm/paranoia. A&Ox4/4, attention and concentration is good, recent and remote memory intact.  Reports good sleep and good appetite.  Discussed her frustration about prolonged hospitalization and complications that have arisen needing her to return to the hospital.  Future oriented reports she wants to get better so she can return home and help her grand daughter plan her wedding.

## 2018-06-13 NOTE — CHART NOTE - NSCHARTNOTEFT_GEN_A_CORE
Patient seen for malnutrition follow-up assessment on 6 Naveen  76 yo female with PMH of CAD S/P HERBERT to LAD, severe AS S/P TAVR, bradycardia S/P PPM, MVR, DVT/PE on coumadin with recent admission including TKR, PNA, MRSA infection. Admitted from rehab with fever, lethargy, nausea possible 2/2 appendicitis (poor surgical candidate per chart, treated with abx) & multiple wound vacs    Source: Patient [x]    Family [x- HHA at bedside]     other [x- medical record]    Diet : Regular diet with Howard 2x & Nepro 2x daily  Pt lethargic, asleep during visit. HHA at bedside states patient had difficult/emotional night but has been eating well. States she is receiving Nepro (berry) 2x daily and consuming with good PO intake. HHA is mixing Howard into orange juice & giving to patient 2x daily as well. [facility] family brought in a fridge to provide patient with meals from home to supplement meals with. No additional food preferences of note currently. Per HHA, patient has not been experiencing any nausea/emesis. Last BM 6/12 per chart.      Current Weight: No new weight since admit weight 100.1Kg  Weight Change: N/A    Pertinent Medications: MEDICATIONS  (STANDING):  ALPRAZolam 0.25 milliGRAM(s) Oral once  ascorbic acid 500 milliGRAM(s) Oral daily  aspirin enteric coated 81 milliGRAM(s) Oral daily  buDESOnide 160 MICROgram(s)/formoterol 4.5 MICROgram(s) Inhaler 2 Puff(s) Inhalation two times a day  calcium carbonate 1250 mG + Vitamin D (OsCal 500 + D) 1 Tablet(s) Oral three times a day  Dakins Solution - 1/4 Strength 1 Application(s) Topical two times a day  folic acid 1 milliGRAM(s) Oral daily  gabapentin 200 milliGRAM(s) Oral three times a day  melatonin 3 milliGRAM(s) Oral at bedtime  meropenem  IVPB 1000 milliGRAM(s) IV Intermittent every 8 hours  metoprolol tartrate 12.5 milliGRAM(s) Oral two times a day  minocycline 100 milliGRAM(s) Oral two times a day  multivitamin 1 Tablet(s) Oral daily  ondansetron Injectable 4 milliGRAM(s) IV Push once  pantoprazole    Tablet 40 milliGRAM(s) Oral before breakfast  polyethylene glycol 3350 17 Gram(s) Oral daily  simvastatin 20 milliGRAM(s) Oral at bedtime  tiotropium 18 MICROgram(s) Capsule 1 Capsule(s) Inhalation daily  warfarin 1 milliGRAM(s) Oral once    MEDICATIONS  (PRN):  acetaminophen   Tablet. 650 milliGRAM(s) Oral every 6 hours PRN Mild Pain (1 - 3)  ALPRAZolam 0.25 milliGRAM(s) Oral daily PRN anxiety  bisacodyl Suppository 10 milliGRAM(s) Rectal daily PRN Constipation  HYDROmorphone   Tablet 2 milliGRAM(s) Oral every 3 hours PRN Moderate Pain (4 - 6)  HYDROmorphone  Injectable 0.5 milliGRAM(s) IV Push every 4 hours PRN Severe Pain (7 - 10)  senna 2 Tablet(s) Oral at bedtime PRN Constipation    Pertinent Labs:  none pertinent      Skin: stage I left sacrum pressure injury, +multiple wounds  Edema: 1+ generalized, 2+ bilateral knee, 2+ right leg edema    Estimated Needs:   [x] no change since previous assessment  [ ] recalculated:       Previous Nutrition Diagnosis:     [x] Malnutrition (Moderate)         Nutrition Diagnosis is [x] improving with good PO intake of supplements + supplemental food provided by family         New Nutrition Diagnosis: [x] not applicable      Recommend    1) Continue regular diet to optimize PO intake  2) Continue Howard 2x daily (160cal, 28gm prot) & Nepro 2x daily (850cal, 39.8gm prot)  3) Continue micronutrient supplementation as ordered  4) Provide food preferences within dietary restrictions when feasible   5) Encourage good PO intake of meals       Monitoring and Evaluation:     [x] PO intake [x] Tolerance to diet prescription [x] weights [x] follow up per protocol    [x] other: RD remains available, Gaby Dobbins RD Pager #[phone]

## 2018-06-13 NOTE — BEHAVIORAL HEALTH ASSESSMENT NOTE - NSBHCHARTREVIEWVS_PSY_A_CORE FT
T(C): 36.6 (06-13-18 @ 14:47), Max: 36.9 (06-13-18 @ 01:44)  HR: 68 (06-13-18 @ 14:47) (62 - 80)  BP: 109/72 (06-13-18 @ 14:47) (109/72 - 141/76)  RR: 18 (06-13-18 @ 14:47) (18 - 18)  SpO2: 96% (06-13-18 @ 14:47) (96% - 99%)  Wt(kg): --

## 2018-06-13 NOTE — BEHAVIORAL HEALTH ASSESSMENT NOTE - NSBHCONSULTFOLLOWAFTERCARE_PSY_A_CORE FT
per primary team  Patient may follow up with [facility] [phone]

## 2018-06-13 NOTE — BEHAVIORAL HEALTH ASSESSMENT NOTE - NSBHSUICPROTECTFACT_PSY_A_CORE
Positive therapeutic relationships/Identifies reasons for living/High spirituality/Responsibility to family and others/Future oriented/Supportive social network or family/Ability to cope with stress

## 2018-06-13 NOTE — PROGRESS NOTE ADULT - ASSESSMENT
no plan for orthopedic surgical intervention per family and patient preference  local wound care to knee and vac therapy to abd/thigh per plastics and wound care teams  PO abx per ID team  encourage patient to spend majority of bed oob in bedside chair as able  PT to help mobilize, she must remain 50% wb on the RLE and NO KNEE MOTION allowed, knee immobilizer if oob  coumadin, inr goal 2-3  continue to optimize nutrition  continue to involve psych, more emotional  nonop plan for appendicitis per gen surg

## 2018-06-13 NOTE — BEHAVIORAL HEALTH ASSESSMENT NOTE - NSBHCONSULTMEDS_PSY_A_CORE FT
Recommendations:  1) No standing psychiatric medications recommended at this time.  2) Continue to recommend melatonin 3mg po hs, gabapentin 200mg po tid and Xanax 0.25mg po prn daily  3) [facility] Psych will follow.

## 2018-06-14 LAB
HCT VFR BLD CALC: 26.7 % — LOW (ref 34.5–45)
HGB BLD-MCNC: 8.2 G/DL — LOW (ref 11.5–15.5)
INR BLD: 1.89 RATIO — HIGH (ref 0.88–1.16)
MCHC RBC-ENTMCNC: 27.6 PG — SIGNIFICANT CHANGE UP (ref 27–34)
MCHC RBC-ENTMCNC: 30.7 GM/DL — LOW (ref 32–36)
MCV RBC AUTO: 89.9 FL — SIGNIFICANT CHANGE UP (ref 80–100)
NIGHT BLUE STAIN TISS: SIGNIFICANT CHANGE UP
PLATELET # BLD AUTO: 258 K/UL — SIGNIFICANT CHANGE UP (ref 150–400)
PROTHROM AB SERPL-ACNC: 21.6 SEC — HIGH (ref 10–13.1)
RBC # BLD: 2.97 M/UL — LOW (ref 3.8–5.2)
RBC # FLD: 18.7 % — HIGH (ref 10.3–14.5)
SPECIMEN SOURCE: SIGNIFICANT CHANGE UP
WBC # BLD: 7.79 K/UL — SIGNIFICANT CHANGE UP (ref 3.8–10.5)
WBC # FLD AUTO: 7.79 K/UL — SIGNIFICANT CHANGE UP (ref 3.8–10.5)

## 2018-06-14 RX ORDER — WARFARIN SODIUM 2.5 MG/1
1 TABLET ORAL ONCE
Qty: 0 | Refills: 0 | Status: COMPLETED | OUTPATIENT
Start: 2018-06-14 | End: 2018-06-14

## 2018-06-14 RX ORDER — SALIVA SUBSTITUTE COMB NO.11 351 MG
30 POWDER IN PACKET (EA) MUCOUS MEMBRANE ONCE
Qty: 0 | Refills: 0 | Status: COMPLETED | OUTPATIENT
Start: 2018-06-14 | End: 2018-06-14

## 2018-06-14 RX ADMIN — Medication 3 MILLIGRAM(S): at 22:31

## 2018-06-14 RX ADMIN — WARFARIN SODIUM 1 MILLIGRAM(S): 2.5 TABLET ORAL at 22:30

## 2018-06-14 RX ADMIN — MINOCYCLINE HYDROCHLORIDE 100 MILLIGRAM(S): 45 TABLET, EXTENDED RELEASE ORAL at 05:55

## 2018-06-14 RX ADMIN — HYDROMORPHONE HYDROCHLORIDE 0.5 MILLIGRAM(S): 2 INJECTION INTRAMUSCULAR; INTRAVENOUS; SUBCUTANEOUS at 23:24

## 2018-06-14 RX ADMIN — Medication 500 MILLIGRAM(S): at 12:35

## 2018-06-14 RX ADMIN — HYDROMORPHONE HYDROCHLORIDE 2 MILLIGRAM(S): 2 INJECTION INTRAMUSCULAR; INTRAVENOUS; SUBCUTANEOUS at 15:10

## 2018-06-14 RX ADMIN — BUDESONIDE AND FORMOTEROL FUMARATE DIHYDRATE 2 PUFF(S): 160; 4.5 AEROSOL RESPIRATORY (INHALATION) at 05:57

## 2018-06-14 RX ADMIN — Medication 1 APPLICATION(S): at 19:09

## 2018-06-14 RX ADMIN — GABAPENTIN 200 MILLIGRAM(S): 400 CAPSULE ORAL at 05:55

## 2018-06-14 RX ADMIN — POLYETHYLENE GLYCOL 3350 17 GRAM(S): 17 POWDER, FOR SOLUTION ORAL at 12:35

## 2018-06-14 RX ADMIN — PANTOPRAZOLE SODIUM 40 MILLIGRAM(S): 20 TABLET, DELAYED RELEASE ORAL at 05:56

## 2018-06-14 RX ADMIN — GABAPENTIN 200 MILLIGRAM(S): 400 CAPSULE ORAL at 22:31

## 2018-06-14 RX ADMIN — Medication 1 APPLICATION(S): at 06:14

## 2018-06-14 RX ADMIN — TIOTROPIUM BROMIDE 1 CAPSULE(S): 18 CAPSULE ORAL; RESPIRATORY (INHALATION) at 12:35

## 2018-06-14 RX ADMIN — HYDROMORPHONE HYDROCHLORIDE 0.5 MILLIGRAM(S): 2 INJECTION INTRAMUSCULAR; INTRAVENOUS; SUBCUTANEOUS at 05:00

## 2018-06-14 RX ADMIN — Medication 1 TABLET(S): at 22:30

## 2018-06-14 RX ADMIN — Medication 12.5 MILLIGRAM(S): at 05:56

## 2018-06-14 RX ADMIN — Medication 1 TABLET(S): at 05:55

## 2018-06-14 RX ADMIN — Medication 12.5 MILLIGRAM(S): at 19:11

## 2018-06-14 RX ADMIN — Medication 30 MILLILITER(S): at 22:30

## 2018-06-14 RX ADMIN — Medication 81 MILLIGRAM(S): at 12:35

## 2018-06-14 RX ADMIN — Medication 1 TABLET(S): at 12:35

## 2018-06-14 RX ADMIN — MINOCYCLINE HYDROCHLORIDE 100 MILLIGRAM(S): 45 TABLET, EXTENDED RELEASE ORAL at 19:11

## 2018-06-14 RX ADMIN — GABAPENTIN 200 MILLIGRAM(S): 400 CAPSULE ORAL at 15:10

## 2018-06-14 RX ADMIN — BUDESONIDE AND FORMOTEROL FUMARATE DIHYDRATE 2 PUFF(S): 160; 4.5 AEROSOL RESPIRATORY (INHALATION) at 19:15

## 2018-06-14 RX ADMIN — HYDROMORPHONE HYDROCHLORIDE 0.5 MILLIGRAM(S): 2 INJECTION INTRAMUSCULAR; INTRAVENOUS; SUBCUTANEOUS at 23:09

## 2018-06-14 RX ADMIN — SIMVASTATIN 20 MILLIGRAM(S): 20 TABLET, FILM COATED ORAL at 22:31

## 2018-06-14 RX ADMIN — MEROPENEM 100 MILLIGRAM(S): 1 INJECTION INTRAVENOUS at 22:30

## 2018-06-14 RX ADMIN — Medication 1 MILLIGRAM(S): at 12:35

## 2018-06-14 RX ADMIN — HYDROMORPHONE HYDROCHLORIDE 0.5 MILLIGRAM(S): 2 INJECTION INTRAMUSCULAR; INTRAVENOUS; SUBCUTANEOUS at 15:53

## 2018-06-14 RX ADMIN — MEROPENEM 100 MILLIGRAM(S): 1 INJECTION INTRAVENOUS at 16:16

## 2018-06-14 RX ADMIN — MEROPENEM 100 MILLIGRAM(S): 1 INJECTION INTRAVENOUS at 05:56

## 2018-06-14 RX ADMIN — Medication 1 TABLET(S): at 15:12

## 2018-06-14 RX ADMIN — HYDROMORPHONE HYDROCHLORIDE 0.5 MILLIGRAM(S): 2 INJECTION INTRAMUSCULAR; INTRAVENOUS; SUBCUTANEOUS at 04:31

## 2018-06-14 RX ADMIN — HYDROMORPHONE HYDROCHLORIDE 0.5 MILLIGRAM(S): 2 INJECTION INTRAMUSCULAR; INTRAVENOUS; SUBCUTANEOUS at 09:21

## 2018-06-14 NOTE — CONSULT NOTE ADULT - ASSESSMENT
ASSESSMENT AND PLAN:     78yo f PMH including HLD, GERD, Anxiety, Anemia, CAD s/p HERBERT, severe AS (s/p TAVR & PPM 12/17 [place] [employer] Model [id number]), h/o MVR, PMR on chronic steroids, asthma, OA, s/p TKR with recent joint infection s/p explant and abx spacer on 12/23/17. Dermatology was consulted for new ulcers.    The new ulcers on L inner thigh and buttock have morphology suggestive of classic pyoderma gangrenosum.     - Skin biopsy for H&E and tissue culture on L inner thigh was performed today  - Please start clobetasol 0.05% oint BID under occlusion to her ulcers on R knee, L inner thigh and L buttock   - Will follow with biopsy results. Would need to discuss treatments other than topical option if the biopsy rules out other etiologies of the ulcers.    The risks/benefits/alternative of skin biopsy were explained to the patient which include but are not limited to scar, bleeding, infection, and recurrence. the area was prepped with rubbing alcohol, lidocaine was injected for anesthesia and biopsy was performed with a 4 mm punch. The wound was packed with Gel foam. The patient tolerated the procedure well. They were instructed to leave the area covered for 24 hours, and after to apply Vaseline to the area daily.     Gilda Castillo MD  Resident Physician, PGY-3  Department of Dermatology  [facility]  Pager: [phone]  Office: [phone] ASSESSMENT AND PLAN:     78yo f PMH including HLD, GERD, Anxiety, Anemia, CAD s/p HERBERT, severe AS (s/p TAVR & PPM 12/17 [place] [employer] Model [id number]), h/o MVR, PMR on chronic steroids, asthma, OA, s/p TKR with recent joint infection s/p explant and abx spacer on 12/23/17. Dermatology was consulted for new ulcers.    The new ulcers on L inner thigh and buttock have morphology suggestive of pyoderma gangrenosum.     - Skin biopsy for H&E and tissue culture on L inner thigh was performed today to r/o alternative etiologies  - Please start clobetasol 0.05% oint BID under occlusion to her ulcers on R knee, L inner thigh and L buttock   - Will follow with biopsy results. Would need to discuss treatments options if the biopsy rules out other etiologies of the ulcers.    The risks/benefits/alternative of skin biopsy were explained to the patient which include but are not limited to scar, bleeding, infection, and recurrence. the area was prepped with rubbing alcohol, lidocaine was injected for anesthesia and biopsy was performed with a 4 mm punch. The wound was packed with Gel foam. The patient tolerated the procedure well. They were instructed to leave the area covered for 24 hours, and after to apply Vaseline to the area daily.     Gilda Castillo MD  Resident Physician, PGY-3  Department of Dermatology  [facility]  Pager: [phone]  Office: [phone]

## 2018-06-14 NOTE — PROGRESS NOTE ADULT - SUBJECTIVE AND OBJECTIVE BOX
Patient is a 77y old  Female who presents with a chief complaint of fever, lethargy (07 Jun 2018 22:19)      SUBJECTIVE / OVERNIGHT EVENTS:    Patient seen and examined. denies cp sob. feeling better today. agrees to speak with psychiatry.    Vital Signs Last 24 Hrs  T(C): 35.7 (14 Jun 2018 01:07), Max: 36.9 (13 Jun 2018 21:20)  T(F): 96.3 (14 Jun 2018 01:07), Max: 98.4 (13 Jun 2018 21:20)  HR: 64 (14 Jun 2018 01:07) (64 - 79)  BP: 113/64 (14 Jun 2018 01:07) (109/72 - 118/68)  BP(mean): --  RR: 18 (14 Jun 2018 01:07) (18 - 18)  SpO2: 99% (14 Jun 2018 01:07) (96% - 99%)  I&O's Summary    13 Jun 2018 07:01  -  14 Jun 2018 07:00  --------------------------------------------------------  IN: 1420 mL / OUT: 0 mL / NET: 1420 mL        PE:  GENERAL: NAD, AAOx3, obese  HEAD:  Atraumatic, Normocephalic  EYES: EOMI, PERRLA, conjunctiva and sclera clear  NECK: Supple, No JVD  CHEST/LUNG: CTABL, No wheeze  HEART: Regular rate and rhythm; no murmur  ABDOMEN: Soft, Nontender, Nondistended; Bowel sounds present  EXTREMITIES:  2+ Peripheral Pulses, right knee eschar, abd vac and right thigh vac, right medial thigh dressing, left lateral thigh dressing  SKIN: No rashes or lesions  NEURO: No focal deficits    LABS:                        8.2    7.79  )-----------( 258      ( 14 Jun 2018 08:06 )             26.7     06-13    141  |  102  |  22  ----------------------------<  91  4.6   |  29  |  0.54    Ca    9.4      13 Jun 2018 06:24      PT/INR - ( 14 Jun 2018 08:08 )   PT: 21.6 sec;   INR: 1.89 ratio           CAPILLARY BLOOD GLUCOSE                RADIOLOGY & ADDITIONAL TESTS:    Imaging Personally Reviewed:  [x] YES  [ ] NO    Consultant(s) Notes Reviewed:  [x] YES  [ ] NO    MEDICATIONS  (STANDING):  ALPRAZolam 0.25 milliGRAM(s) Oral once  ascorbic acid 500 milliGRAM(s) Oral daily  aspirin enteric coated 81 milliGRAM(s) Oral daily  buDESOnide 160 MICROgram(s)/formoterol 4.5 MICROgram(s) Inhaler 2 Puff(s) Inhalation two times a day  calcium carbonate 1250 mG + Vitamin D (OsCal 500 + D) 1 Tablet(s) Oral three times a day  clobetasol 0.05% Ointment 1 Application(s) Topical two times a day  Dakins Solution - 1/4 Strength 1 Application(s) Topical two times a day  folic acid 1 milliGRAM(s) Oral daily  gabapentin 200 milliGRAM(s) Oral three times a day  melatonin 3 milliGRAM(s) Oral at bedtime  meropenem  IVPB 1000 milliGRAM(s) IV Intermittent every 8 hours  metoprolol tartrate 12.5 milliGRAM(s) Oral two times a day  minocycline 100 milliGRAM(s) Oral two times a day  multivitamin 1 Tablet(s) Oral daily  ondansetron Injectable 4 milliGRAM(s) IV Push once  pantoprazole    Tablet 40 milliGRAM(s) Oral before breakfast  polyethylene glycol 3350 17 Gram(s) Oral daily  simvastatin 20 milliGRAM(s) Oral at bedtime  tiotropium 18 MICROgram(s) Capsule 1 Capsule(s) Inhalation daily    MEDICATIONS  (PRN):  acetaminophen   Tablet. 650 milliGRAM(s) Oral every 6 hours PRN Mild Pain (1 - 3)  ALPRAZolam 0.25 milliGRAM(s) Oral daily PRN anxiety  bisacodyl Suppository 10 milliGRAM(s) Rectal daily PRN Constipation  HYDROmorphone   Tablet 2 milliGRAM(s) Oral every 3 hours PRN Moderate Pain (4 - 6)  HYDROmorphone  Injectable 0.5 milliGRAM(s) IV Push every 4 hours PRN Severe Pain (7 - 10)  senna 2 Tablet(s) Oral at bedtime PRN Constipation      Care Discussed with Consultants/Other Providers [x] YES  [ ] NO    HEALTH ISSUES - PROBLEM Dx:  Joint infection: Joint infection  Other problems related to medical facilities and other health care: Other problems related to medical facilities and other health care  S/P TAVR (transcatheter aortic valve replacement): S/P TAVR (transcatheter aortic valve replacement)  Acute cystitis without hematuria: Acute cystitis without hematuria  Wound infection: Wound infection  Acute appendicitis, unspecified acute appendicitis type: Acute appendicitis, unspecified acute appendicitis type  Fever, unspecified fever cause: Fever, unspecified fever cause

## 2018-06-14 NOTE — CHART NOTE - NSCHARTNOTEFT_GEN_A_CORE
Mr Rdz called me today at my office and I called him back and he was very angry with me and the various teams taking care of his wife stating that we have been ignoring her leg lesions and failing to diagnose them as pyoderma granulosum. He said we are intentionally causing her more pain and I said this is absolutely not true. He states that his wife definitively has this condition based on his conversations with medical teams and his reading of articles on the internet and claims that we have failed him in our work up. I explained to him at length that this is a working diagnosis right now from the dermatology team and that this is NOT yet confirmed as the biopsy results are not yet finalized. I told him that all of the various medical teams are working very hard to take care of his wife who has many different medical problems and we are working together to try to help her. I explained that the prior biopsy did not show this diagnosis but perhaps (at the opinion of the dermatology team) the new biopsy will show this diagnosis. However this will be a challenging treatment as systemic steroids are likely contraindicated in the setting of her complex healing wounds and periprosthetic joint infection being suppressed. Local injections of steroids may be required. He then started asking me why I did not know more about pyoderma granulosum and I told him that this is a disease that is diagnosed by dermatology and I am not a specialist in this area. I recommended he ask many of his questions to primary care or dermatology.    Nate Bass MD

## 2018-06-14 NOTE — PROGRESS NOTE ADULT - ASSESSMENT
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 [employer] Model [id number], MVR, 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 with recent admission from (3/9/2018 to 6/6/2018) for TKR c/b joint infection s/p explantation with multiple wounds managed by plastic surgery who presents from rehab today for evaluation of fever, lethargy, and nausea at rehab found to be febrile here with evidence of appendicitis on CT abdomen, non-operative candidate,     # Fever 2/2 acute appendicitis  Cont Meropenem via picc 4 more days  bcx and ucx ngtd  Acute appendicitis: not a candidate-for sx  ID c/s appreciated    # Wound infection Patient has multiple wounds on R thigh as well as mid-abdomen  continue vac to abdomen, continue adaptic and vac to thigh  vac changes M/W/F  continue daily wet-to-dry dressing over open portion of knee wound  pain control  appreciate ortho, and plastics recs and wound care  appreciate derm fu, fu repeat biopsy, concern for pyoderma gangrenosum  chronic minocycline    #Pain control  dilaudid2 po q4 prn, 0.5 iv prior to dressing change  bowel regimen    # chr Anemia  stable, monitor h/h closely    # S/P TAVR (transcatheter aortic valve replacement)  continue Coumadin, daily INR    # adjustment disorder with depressed mood  psych re-eval 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 [place] [employer] Model [id number], MVR, 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 with recent admission from (3/9/2018 to 6/6/2018) for TKR c/b joint infection s/p explantation with multiple wounds managed by plastic surgery who presents from rehab today for evaluation of fever, lethargy, and nausea at rehab found to be febrile here with evidence of appendicitis on CT abdomen, non-operative candidate,     # Fever 2/2 acute appendicitis  Cont Meropenem via picc 4 more days  bcx and ucx ngtd  Acute appendicitis: not a candidate-for sx  ID c/s appreciated    # Wound infection Patient has multiple wounds on R thigh as well as mid-abdomen  continue vac to abdomen, continue adaptic and vac to thigh  vac changes M/W/F  continue daily wet-to-dry dressing over open portion of knee wound  pain control  appreciate ortho, and plastics recs and wound care  appreciate derm fu, fu repeat biopsy, question if pyoderma gangrenosum, however prior biopsy negative, nevertheless, treatment will be steroids which would compromise wound healing  chronic minocycline    #Pain control  dilaudid2 po q4 prn, 0.5 iv prior to dressing change  bowel regimen    # chr Anemia  stable, monitor h/h closely    # S/P TAVR (transcatheter aortic valve replacement)  continue Coumadin, daily INR    # adjustment disorder with depressed mood  psych re-eval

## 2018-06-14 NOTE — CONSULT NOTE ADULT - SUBJECTIVE AND OBJECTIVE BOX
HPI:  78yo f PMH including HLD, GERD, Anxiety, Anemia, CAD s/p HERBERT, severe AS (s/p TAVR & PPM 12/17 [employer] [employer] Model [id number]), h/o MVR, PMR on chronic steroids, asthma, OA, s/p TKR with recent joint infection s/p explant and abx spacer on 12/23/17, + rehab when had RLE DVT (dvt proph was held 2nd nose bleed)  on Coumadins/p  3/23/2018 Right knee explantation of previously placed articulating antibiotic spacer, irrigation and debridement of the knee, placement of static antibiotic spacer, with a Plastic Surgery soft tissue complex wound closure.     Pt's hospital course c/b skin ulcerations on b/l thighs and abdomen X over 1 week. Debridement has not helped. Pt says lesions are painful, sometimes oozing.  Pt was re-admitted on 6/11 for appendicitis. Dermatology was reconsulted for the slowly healing ulcers. Patient's daughter reports new ulcers on L thigh and L buttock.    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  Artificial cardiac pacemaker: 12/25/17  H/O cardiac catheterization: 11/29/16 HERBERT placed on LAD  H/O endoscopy: with dilatation of esophageal stricture 2013  S/P cataract surgery: x2; 3-4yr ago  S/P gastroplasty: 28 yrs ago  S/P cholecystectomy: more than 15 years ago  S/P total knee replacement: left, 15yr ago  S/P total knee replacement: right, 12yr ago  S/P hysterectomy: 24yr ago      General: no fevers/chills, no lethargy  Skin: See HPI  Ophthalmologic: no eye pain or change in vision  ENT: no dysphagia or change in hearing  Respiratory: no SOB or cough  Cardiovascular: no palpitations or chest pain  Gastrointestinal: no abdominal  pain or blood in stool  Genitourinary: no dysuria or frequency  Musculoskeletal: no joint pain or weakness  Neurological: no weakness, numbness or tingling    MEDICATIONS  (STANDING):  ALPRAZolam 0.25 milliGRAM(s) Oral once  ascorbic acid 500 milliGRAM(s) Oral daily  aspirin enteric coated 81 milliGRAM(s) Oral daily  buDESOnide 160 MICROgram(s)/formoterol 4.5 MICROgram(s) Inhaler 2 Puff(s) Inhalation two times a day  calcium carbonate 1250 mG + Vitamin D (OsCal 500 + D) 1 Tablet(s) Oral three times a day  Dakins Solution - 1/4 Strength 1 Application(s) Topical two times a day  folic acid 1 milliGRAM(s) Oral daily  gabapentin 200 milliGRAM(s) Oral three times a day  melatonin 3 milliGRAM(s) Oral at bedtime  meropenem  IVPB 1000 milliGRAM(s) IV Intermittent every 8 hours  metoprolol tartrate 12.5 milliGRAM(s) Oral two times a day  minocycline 100 milliGRAM(s) Oral two times a day  multivitamin 1 Tablet(s) Oral daily  ondansetron Injectable 4 milliGRAM(s) IV Push once  pantoprazole    Tablet 40 milliGRAM(s) Oral before breakfast  polyethylene glycol 3350 17 Gram(s) Oral daily  simvastatin 20 milliGRAM(s) Oral at bedtime  tiotropium 18 MICROgram(s) Capsule 1 Capsule(s) Inhalation daily    MEDICATIONS  (PRN):  acetaminophen   Tablet. 650 milliGRAM(s) Oral every 6 hours PRN Mild Pain (1 - 3)  ALPRAZolam 0.25 milliGRAM(s) Oral daily PRN anxiety  bisacodyl Suppository 10 milliGRAM(s) Rectal daily PRN Constipation  HYDROmorphone   Tablet 2 milliGRAM(s) Oral every 3 hours PRN Moderate Pain (4 - 6)  HYDROmorphone  Injectable 0.5 milliGRAM(s) IV Push every 4 hours PRN Severe Pain (7 - 10)  senna 2 Tablet(s) Oral at bedtime PRN Constipation      Allergies    amoxicillin (Other)    Intolerances    IV Contrast (Flushing (Skin); Nausea)      SOCIAL HISTORY: Non smoker    FAMILY HISTORY:  No pertinent family history in first degree relatives      Vital Signs Last 24 Hrs  T(C): 35.7 (14 Jun 2018 01:07), Max: 36.9 (13 Jun 2018 21:20)  T(F): 96.3 (14 Jun 2018 01:07), Max: 98.4 (13 Jun 2018 21:20)  HR: 64 (14 Jun 2018 01:07) (62 - 79)  BP: 113/64 (14 Jun 2018 01:07) (109/72 - 122/69)  BP(mean): --  RR: 18 (14 Jun 2018 01:07) (18 - 18)  SpO2: 99% (14 Jun 2018 01:07) (96% - 99%)    PHYSICAL EXAM:     The patient was alert and oriented X 3, well nourished, and in no  apparent distress.  OP showed no ulcerations  There was no visible lymphadenopathy.  Conjunctiva were non injected  There was no clubbing or edema of extremities.  The scalp, hair, face, eyebrows, lips, OP, neck, chest, back,   extremities X 4, nails were examined.  There was no hyperhidrosis or bromhidrosis.    Of note on skin exam:   Healing ulcer with healthy granulation on R medial knee  Round ulcers with undermined erythematous borders on L inner thigh and L buttock  ulcers on lower abdomen and R thigh dressed with wound vac      LABS:                        7.9    8.25  )-----------( 283      ( 13 Jun 2018 07:41 )             27.7     06-13    141  |  102  |  22  ----------------------------<  91  4.6   |  29  |  0.54    Ca    9.4      13 Jun 2018 06:24      PT/INR - ( 13 Jun 2018 09:31 )   PT: 20.3 sec;   INR: 1.78 ratio

## 2018-06-15 LAB
ANION GAP SERPL CALC-SCNC: 10 MMOL/L — SIGNIFICANT CHANGE UP (ref 5–17)
BUN SERPL-MCNC: 29 MG/DL — HIGH (ref 7–23)
CALCIUM SERPL-MCNC: 9.3 MG/DL — SIGNIFICANT CHANGE UP (ref 8.4–10.5)
CHLORIDE SERPL-SCNC: 103 MMOL/L — SIGNIFICANT CHANGE UP (ref 96–108)
CO2 SERPL-SCNC: 29 MMOL/L — SIGNIFICANT CHANGE UP (ref 22–31)
CREAT SERPL-MCNC: 0.5 MG/DL — SIGNIFICANT CHANGE UP (ref 0.5–1.3)
GLUCOSE SERPL-MCNC: 116 MG/DL — HIGH (ref 70–99)
HCT VFR BLD CALC: 26 % — LOW (ref 34.5–45)
HGB BLD-MCNC: 7.8 G/DL — LOW (ref 11.5–15.5)
INR BLD: 1.96 RATIO — HIGH (ref 0.88–1.16)
MCHC RBC-ENTMCNC: 27.2 PG — SIGNIFICANT CHANGE UP (ref 27–34)
MCHC RBC-ENTMCNC: 30 GM/DL — LOW (ref 32–36)
MCV RBC AUTO: 90.6 FL — SIGNIFICANT CHANGE UP (ref 80–100)
PLATELET # BLD AUTO: 241 K/UL — SIGNIFICANT CHANGE UP (ref 150–400)
POTASSIUM SERPL-MCNC: 4.7 MMOL/L — SIGNIFICANT CHANGE UP (ref 3.5–5.3)
POTASSIUM SERPL-SCNC: 4.7 MMOL/L — SIGNIFICANT CHANGE UP (ref 3.5–5.3)
PROTHROM AB SERPL-ACNC: 22.5 SEC — HIGH (ref 10–13.1)
RBC # BLD: 2.87 M/UL — LOW (ref 3.8–5.2)
RBC # FLD: 18.5 % — HIGH (ref 10.3–14.5)
SODIUM SERPL-SCNC: 142 MMOL/L — SIGNIFICANT CHANGE UP (ref 135–145)
SURGICAL PATHOLOGY STUDY: SIGNIFICANT CHANGE UP
WBC # BLD: 6.91 K/UL — SIGNIFICANT CHANGE UP (ref 3.8–10.5)
WBC # FLD AUTO: 6.91 K/UL — SIGNIFICANT CHANGE UP (ref 3.8–10.5)

## 2018-06-15 RX ORDER — WARFARIN SODIUM 2.5 MG/1
1 TABLET ORAL ONCE
Qty: 0 | Refills: 0 | Status: COMPLETED | OUTPATIENT
Start: 2018-06-15 | End: 2018-06-15

## 2018-06-15 RX ADMIN — HYDROMORPHONE HYDROCHLORIDE 0.5 MILLIGRAM(S): 2 INJECTION INTRAMUSCULAR; INTRAVENOUS; SUBCUTANEOUS at 16:53

## 2018-06-15 RX ADMIN — HYDROMORPHONE HYDROCHLORIDE 2 MILLIGRAM(S): 2 INJECTION INTRAMUSCULAR; INTRAVENOUS; SUBCUTANEOUS at 05:22

## 2018-06-15 RX ADMIN — BUDESONIDE AND FORMOTEROL FUMARATE DIHYDRATE 2 PUFF(S): 160; 4.5 AEROSOL RESPIRATORY (INHALATION) at 17:08

## 2018-06-15 RX ADMIN — WARFARIN SODIUM 1 MILLIGRAM(S): 2.5 TABLET ORAL at 21:02

## 2018-06-15 RX ADMIN — Medication 1 APPLICATION(S): at 05:18

## 2018-06-15 RX ADMIN — Medication 1 APPLICATION(S): at 17:06

## 2018-06-15 RX ADMIN — Medication 1 TABLET(S): at 05:17

## 2018-06-15 RX ADMIN — GABAPENTIN 200 MILLIGRAM(S): 400 CAPSULE ORAL at 13:58

## 2018-06-15 RX ADMIN — Medication 650 MILLIGRAM(S): at 11:05

## 2018-06-15 RX ADMIN — Medication 500 MILLIGRAM(S): at 11:12

## 2018-06-15 RX ADMIN — HYDROMORPHONE HYDROCHLORIDE 2 MILLIGRAM(S): 2 INJECTION INTRAMUSCULAR; INTRAVENOUS; SUBCUTANEOUS at 09:01

## 2018-06-15 RX ADMIN — Medication 12.5 MILLIGRAM(S): at 05:17

## 2018-06-15 RX ADMIN — PANTOPRAZOLE SODIUM 40 MILLIGRAM(S): 20 TABLET, DELAYED RELEASE ORAL at 05:17

## 2018-06-15 RX ADMIN — HYDROMORPHONE HYDROCHLORIDE 0.5 MILLIGRAM(S): 2 INJECTION INTRAMUSCULAR; INTRAVENOUS; SUBCUTANEOUS at 21:16

## 2018-06-15 RX ADMIN — Medication 1 TABLET(S): at 11:12

## 2018-06-15 RX ADMIN — Medication 1 TABLET(S): at 21:02

## 2018-06-15 RX ADMIN — Medication 1 TABLET(S): at 13:58

## 2018-06-15 RX ADMIN — TIOTROPIUM BROMIDE 1 CAPSULE(S): 18 CAPSULE ORAL; RESPIRATORY (INHALATION) at 11:12

## 2018-06-15 RX ADMIN — MEROPENEM 100 MILLIGRAM(S): 1 INJECTION INTRAVENOUS at 05:17

## 2018-06-15 RX ADMIN — MINOCYCLINE HYDROCHLORIDE 100 MILLIGRAM(S): 45 TABLET, EXTENDED RELEASE ORAL at 05:17

## 2018-06-15 RX ADMIN — GABAPENTIN 200 MILLIGRAM(S): 400 CAPSULE ORAL at 05:17

## 2018-06-15 RX ADMIN — Medication 12.5 MILLIGRAM(S): at 17:08

## 2018-06-15 RX ADMIN — SIMVASTATIN 20 MILLIGRAM(S): 20 TABLET, FILM COATED ORAL at 21:02

## 2018-06-15 RX ADMIN — HYDROMORPHONE HYDROCHLORIDE 2 MILLIGRAM(S): 2 INJECTION INTRAMUSCULAR; INTRAVENOUS; SUBCUTANEOUS at 04:22

## 2018-06-15 RX ADMIN — Medication 1 MILLIGRAM(S): at 11:12

## 2018-06-15 RX ADMIN — MEROPENEM 100 MILLIGRAM(S): 1 INJECTION INTRAVENOUS at 21:01

## 2018-06-15 RX ADMIN — MEROPENEM 100 MILLIGRAM(S): 1 INJECTION INTRAVENOUS at 13:58

## 2018-06-15 RX ADMIN — HYDROMORPHONE HYDROCHLORIDE 2 MILLIGRAM(S): 2 INJECTION INTRAMUSCULAR; INTRAVENOUS; SUBCUTANEOUS at 08:01

## 2018-06-15 RX ADMIN — HYDROMORPHONE HYDROCHLORIDE 0.5 MILLIGRAM(S): 2 INJECTION INTRAMUSCULAR; INTRAVENOUS; SUBCUTANEOUS at 10:07

## 2018-06-15 RX ADMIN — GABAPENTIN 200 MILLIGRAM(S): 400 CAPSULE ORAL at 21:02

## 2018-06-15 RX ADMIN — BUDESONIDE AND FORMOTEROL FUMARATE DIHYDRATE 2 PUFF(S): 160; 4.5 AEROSOL RESPIRATORY (INHALATION) at 05:18

## 2018-06-15 RX ADMIN — HYDROMORPHONE HYDROCHLORIDE 0.5 MILLIGRAM(S): 2 INJECTION INTRAMUSCULAR; INTRAVENOUS; SUBCUTANEOUS at 09:49

## 2018-06-15 RX ADMIN — HYDROMORPHONE HYDROCHLORIDE 0.5 MILLIGRAM(S): 2 INJECTION INTRAMUSCULAR; INTRAVENOUS; SUBCUTANEOUS at 21:01

## 2018-06-15 RX ADMIN — Medication 650 MILLIGRAM(S): at 12:05

## 2018-06-15 RX ADMIN — Medication 81 MILLIGRAM(S): at 11:12

## 2018-06-15 RX ADMIN — POLYETHYLENE GLYCOL 3350 17 GRAM(S): 17 POWDER, FOR SOLUTION ORAL at 11:16

## 2018-06-15 RX ADMIN — HYDROMORPHONE HYDROCHLORIDE 0.5 MILLIGRAM(S): 2 INJECTION INTRAMUSCULAR; INTRAVENOUS; SUBCUTANEOUS at 17:08

## 2018-06-15 RX ADMIN — MINOCYCLINE HYDROCHLORIDE 100 MILLIGRAM(S): 45 TABLET, EXTENDED RELEASE ORAL at 17:09

## 2018-06-15 RX ADMIN — Medication 3 MILLIGRAM(S): at 21:02

## 2018-06-15 NOTE — PROGRESS NOTE ADULT - ASSESSMENT
no plan for orthopedic surgical intervention per family and patient preference  local wound care to knee and vac therapy to abd/thigh per plastics and wound care teams  PO abx per ID team  encourage patient to spend majority of bed oob in bedside chair as able  PT to help mobilize, she must remain 50% wb on the RLE and NO KNEE MOTION allowed, knee immobilizer if oob  coumadin, inr goal 2-3  continue to optimize nutrition  continue to involve psych, more emotional  nonop plan for appendicitis per gen surg, wbc improved  derm working up leg lesions, possible pyoderma granulosum, pending biopsy results    discussed with patient today. unable to reach Mr. Rdz by telephone today

## 2018-06-15 NOTE — PROGRESS NOTE ADULT - SUBJECTIVE AND OBJECTIVE BOX
pain controlled, less emotional today    abdominal wound - vac  RLE  thigh - vac  knee wound with stable fibrinous base, dressed by prs  5/5 ta/ehl/gcs  silt l4-s1  2+ dp pulse

## 2018-06-15 NOTE — PROGRESS NOTE ADULT - SUBJECTIVE AND OBJECTIVE BOX
Plastic Surgery Progress Note (pg [facility]: [id number], [facility]: [phone])    SUBJECTIVE:  Doing well. No overnight events.     OBJECTIVE:     ** VITAL SIGNS / I&O's **    Vital Signs Last 24 Hrs  T(C): 36.5 (15 Ludwin 2018 05:03), Max: 36.7 (15 Ludwin 2018 01:24)  T(F): 97.7 (15 Ludwin 2018 05:03), Max: 98.1 (15 Ludwin 2018 01:24)  HR: 63 (15 Lduwin 2018 05:03) (63 - 97)  BP: 132/73 (15 Ludwin 2018 05:03) (132/73 - 137/67)  BP(mean): --  RR: 16 (15 Ludwin 2018 05:03) (16 - 18)  SpO2: 100% (15 Ludwin 2018 05:03) (96% - 100%)      14 Jun 2018 07:01  -  15 Ludwin 2018 07:00  --------------------------------------------------------  IN:    Oral Fluid: 860 mL    Solution: 100 mL  Total IN: 960 mL    OUT:    Voided: 330 mL  Total OUT: 330 mL    Total NET: 630 mL          ** PHYSICAL EXAM **    -- CONSTITUTIONAL: AOx3. NAD.   -- RLE: VACs holding; knee wound w/ fibrinous exudate      **MEDS**  acetaminophen   Tablet. 650 milliGRAM(s) Oral every 6 hours PRN  ALPRAZolam 0.25 milliGRAM(s) Oral daily PRN  ALPRAZolam 0.25 milliGRAM(s) Oral once  ascorbic acid 500 milliGRAM(s) Oral daily  aspirin enteric coated 81 milliGRAM(s) Oral daily  bisacodyl Suppository 10 milliGRAM(s) Rectal daily PRN  buDESOnide 160 MICROgram(s)/formoterol 4.5 MICROgram(s) Inhaler 2 Puff(s) Inhalation two times a day  calcium carbonate 1250 mG + Vitamin D (OsCal 500 + D) 1 Tablet(s) Oral three times a day  clobetasol 0.05% Ointment 1 Application(s) Topical two times a day  Dakins Solution - 1/4 Strength 1 Application(s) Topical two times a day  folic acid 1 milliGRAM(s) Oral daily  gabapentin 200 milliGRAM(s) Oral three times a day  HYDROmorphone   Tablet 2 milliGRAM(s) Oral every 3 hours PRN  HYDROmorphone  Injectable 0.5 milliGRAM(s) IV Push every 4 hours PRN  melatonin 3 milliGRAM(s) Oral at bedtime  meropenem  IVPB 1000 milliGRAM(s) IV Intermittent every 8 hours  metoprolol tartrate 12.5 milliGRAM(s) Oral two times a day  minocycline 100 milliGRAM(s) Oral two times a day  multivitamin 1 Tablet(s) Oral daily  ondansetron Injectable 4 milliGRAM(s) IV Push once  pantoprazole    Tablet 40 milliGRAM(s) Oral before breakfast  polyethylene glycol 3350 17 Gram(s) Oral daily  senna 2 Tablet(s) Oral at bedtime PRN  simvastatin 20 milliGRAM(s) Oral at bedtime  tiotropium 18 MICROgram(s) Capsule 1 Capsule(s) Inhalation daily      ** LABS **                          8.2    7.79  )-----------( 258      ( 14 Jun 2018 08:06 )             26.7     15 Ludwin 2018 05:53    142    |  103    |  29     ----------------------------<  116    4.7     |  29     |  0.50     Ca    9.3        15 Ludwin 2018 05:53      PT/INR - ( 14 Jun 2018 08:08 )   PT: 21.6 sec;   INR: 1.89 ratio

## 2018-06-15 NOTE — PROGRESS NOTE ADULT - ASSESSMENT
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 [place] [employer] Model [id number], MVR, 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 with recent admission from (3/9/2018 to 6/6/2018) for TKR c/b joint infection s/p explantation with multiple wounds managed by plastic surgery who presents from rehab today for evaluation of fever, lethargy, and nausea at rehab found to be febrile here with evidence of appendicitis on CT abdomen, non-operative candidate,     # acute appendicitis  Cont Meropenem via picc 3 more days  bcx and ucx ngtd  no surgical candidate, medically managed, fever resolved  ID c/s appreciated    # abd wound, right thigh wound  continue vac to abdomen, continue adaptic and vac to thigh  vac changes M/W/F  continue daily wet-to-dry dressing over open portion of knee wound  pain control  appreciate ortho and plastics recs and wound care  appreciate derm fu, fu repeat biopsy, question if this is pyoderma gangrenosum, however, prior biopsy negative. 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 [place] [employer] Model [id number], MVR, 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 with recent admission from (3/9/2018 to 6/6/2018) for TKR c/b joint infection s/p explantation with multiple wounds managed by plastic surgery who presents from rehab today for evaluation of fever, lethargy, and nausea at rehab found to be febrile here with evidence of appendicitis on CT abdomen, non-operative candidate,     # acute appendicitis  Cont Meropenem via picc 3 more days  bcx and ucx ngtd, afebrile  no surgical candidate, improving with medical management  ID c/s appreciated    # abd wound, right thigh wound, right knee wound  continue vac to abdomen, continue adaptic and vac to thigh  vac changes M/W/F  continue daily wet-to-dry dressing over open portion of knee wound  pain control  appreciate ortho and plastics recs and wound care  dw derm fellow, fu biopsy and tissue culture results, if this is PG, would require steroids which would compromise wound healing  chronic minocycline    # Pain control  dilaudid2 po q4 prn, 0.5 iv prior to dressing change  bowel regimen    # chr Anemia  stable, monitor h/h closely    # S/P TAVR (transcatheter aortic valve replacement)  continue Coumadin, daily INR    # adjustment disorder with depressed mood  appreciate psych re eval  xanax prn    plan of care was discussed in depth with son and daughter at bedside  plan of care was discussed with Mr. 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 [place] [employer] Model [id number], MVR, 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 with recent admission from (3/9/2018 to 6/6/2018) for TKR c/b joint infection s/p explantation with multiple wounds managed by plastic surgery who presents from rehab today for evaluation of fever, lethargy, and nausea at rehab found to be febrile here with evidence of appendicitis on CT abdomen, non-operative candidate,     # acute appendicitis  Cont Meropenem via picc 3 more days  bcx and ucx ngtd, afebrile  no surgical candidate, improving with medical management  ID c/s appreciated    # abd wound, right thigh wound, right knee wound  continue vac to abdomen, continue adaptic and vac to thigh  vac changes M/W/F  continue daily wet-to-dry dressing over open portion of knee wound  pain control  appreciate ortho and plastics recs and wound care  dw derm fellow, they will fu today, fu biopsy and tissue culture results, if this is PG, would require steroids which would compromise wound healing  chronic minocycline    # Pain control  dilaudid2 po q4 prn, 0.5 iv prior to dressing change  bowel regimen    # chr Anemia  stable, monitor h/h closely    # S/P TAVR (transcatheter aortic valve replacement)  continue Coumadin, daily INR    # adjustment disorder with depressed mood  appreciate psych re eval  xanax prn    plan of care was discussed in depth with son and daughter at bedside  plan of care was discussed with Mr. Rdz [phone] over the phone  All questions were answered to the best of my ability  They agree with plan of care

## 2018-06-15 NOTE — CHART NOTE - NSCHARTNOTEFT_GEN_A_CORE
MEDICINE PA     Notified RN that patient with critical value of tissue cx gram + cocci in pair in fluid media- prelim result. c/s pending. Pt with prosthetic knee infection- on suppressive minocycline. Continue minocycline, pending c/s. ID f/u.     KAYLA De-C   Department of Medicine   Spectra [id number] MEDICINE PA     Notified RN that patient with critical value of tissue cx gram + cocci in pair in fluid media- prelim result. c/s pending. Pt with prosthetic knee infection- on suppressive minocycline. Continue minocycline, pending c/s, final culture results. ID f/u.     Natty Tello PA-C   Department of Medicine   Spectra [id number] MEDICINE PA     Notified RN that patient with critical value of tissue cx gram + cocci in pairs in fluid media- prelim result.  Pt with prosthetic knee infection- on suppressive minocycline. Continue minocycline, pending c/s, final culture results. ID f/u.     KAYLA De-C   Department of Medicine   Spectra [id number]

## 2018-06-15 NOTE — PROGRESS NOTE ADULT - ASSESSMENT
A/P: s/p Right total knee insertion of spacer, irrigation and debridement, complex wound closure 3/23; readmitted 6/8 for appendicitis  - cont vac change M/W/F  - c/w W2D over knee wound  - will cont to follow

## 2018-06-15 NOTE — PROVIDER CONTACT NOTE (CRITICAL VALUE NOTIFICATION) - BACKGROUND
Pt came in on 6/7 for complications of wound. + MRSA in wounds. Hx R knee explantation. CAD. Severe AS.

## 2018-06-15 NOTE — CHART NOTE - NSCHARTNOTEFT_GEN_A_CORE
INTERVAL HPI/OVERNIGHT EVENTS:    Biopsy result became available this afternoon.     MEDICATIONS  (STANDING):  ALPRAZolam 0.25 milliGRAM(s) Oral once  ascorbic acid 500 milliGRAM(s) Oral daily  aspirin enteric coated 81 milliGRAM(s) Oral daily  buDESOnide 160 MICROgram(s)/formoterol 4.5 MICROgram(s) Inhaler 2 Puff(s) Inhalation two times a day  calcium carbonate 1250 mG + Vitamin D (OsCal 500 + D) 1 Tablet(s) Oral three times a day  clobetasol 0.05% Ointment 1 Application(s) Topical two times a day  Dakins Solution - 1/4 Strength 1 Application(s) Topical two times a day  folic acid 1 milliGRAM(s) Oral daily  gabapentin 200 milliGRAM(s) Oral three times a day  melatonin 3 milliGRAM(s) Oral at bedtime  meropenem  IVPB 1000 milliGRAM(s) IV Intermittent every 8 hours  metoprolol tartrate 12.5 milliGRAM(s) Oral two times a day  minocycline 100 milliGRAM(s) Oral two times a day  multivitamin 1 Tablet(s) Oral daily  ondansetron Injectable 4 milliGRAM(s) IV Push once  pantoprazole    Tablet 40 milliGRAM(s) Oral before breakfast  polyethylene glycol 3350 17 Gram(s) Oral daily  simvastatin 20 milliGRAM(s) Oral at bedtime  tiotropium 18 MICROgram(s) Capsule 1 Capsule(s) Inhalation daily  warfarin 1 milliGRAM(s) Oral once    MEDICATIONS  (PRN):  acetaminophen   Tablet. 650 milliGRAM(s) Oral every 6 hours PRN Mild Pain (1 - 3)  ALPRAZolam 0.25 milliGRAM(s) Oral daily PRN anxiety  bisacodyl Suppository 10 milliGRAM(s) Rectal daily PRN Constipation  HYDROmorphone   Tablet 2 milliGRAM(s) Oral every 3 hours PRN Moderate Pain (4 - 6)  HYDROmorphone  Injectable 0.5 milliGRAM(s) IV Push every 4 hours PRN Severe Pain (7 - 10)  senna 2 Tablet(s) Oral at bedtime PRN Constipation      Allergies    amoxicillin (Other)    Intolerances    IV Contrast (Flushing (Skin); Nausea)      REVIEW OF SYSTEMS      General: no fevers/chills, no NS	    Skin: see HPI  	  Ophthalmologic: no eye pain or change in vision    Genitourinary: no dysuria or hematuria    Musculoskeletal: no joint pains or weakness	    Neurological:no weakness or tingling          Vital Signs Last 24 Hrs  T(C): 36.9 (15 Ludwin 2018 15:59), Max: 36.9 (15 Ludwin 2018 15:59)  T(F): 98.4 (15 Ludwin 2018 15:59), Max: 98.4 (15 Ludwin 2018 15:59)  HR: 63 (15 Ludwin 2018 17:04) (61 - 97)  BP: 138/81 (15 Ludwin 2018 17:04) (115/67 - 138/81)  BP(mean): --  RR: 18 (15 Ludwin 2018 15:59) (16 - 18)  SpO2: 95% (15 Ludwin 2018 15:59) (95% - 100%)    PHYSICAL EXAM:   The patient was alert and oriented X 3, well nourished, and in no apparent distress.  There was no visible lymphadenopathy.  Conjunctiva were non injected  There was no clubbing or edema of extremities.  There was no hyperhidrosis or bromhidrosis.    Of note on skin exam:   Ulcers on lower abd, R and L thighs    LABS:                        7.8    6.91  )-----------( 241      ( 15 Ludwin 2018 07:48 )             26.0     06-15    142  |  103  |  29<H>  ----------------------------<  116<H>  4.7   |  29  |  0.50    Ca    9.3      15 Ludwin 2018 05:53      PT/INR - ( 15 Ludwin 2018 09:27 )   PT: 22.5 sec;   INR: 1.96 ratio             ASSESSMENT AND PLAN:   78 yo F with PMR on pred and s/p total R knee replacement c/b parasthetic infection s/p explantation.   Derm is evaluating her for multiple ulcers on abd and thighs    - Skin biopsy result became available this afternoon  - Pt will be officially seen by attending tomorrow      Gilda Castillo MD  Resident Physician, PGY-3  Department of Dermatology  [facility]  Pager: [phone]  Office: [phone]

## 2018-06-15 NOTE — PROGRESS NOTE ADULT - SUBJECTIVE AND OBJECTIVE BOX
CC: f/u for appendicitis, prosthetic knee infection    Patient reports she feels ok today and had her vacs changed    REVIEW OF SYSTEMS:  All other review of systems negative (Comprehensive ROS)    Antimicrobials Day #  :  meropenem  IVPB 1000 milliGRAM(s) IV Intermittent every 8 hours  Day 8/10  minocycline 100 milliGRAM(s) Oral two times a day    Other Medications Reviewed    T(F): 98 (06-15-18 @ 10:45), Max: 98.1 (06-15-18 @ 01:24)  HR: 79 (06-15-18 @ 10:45)  BP: 115/67 (06-15-18 @ 10:45)  RR: 18 (06-15-18 @ 10:45)  SpO2: 96% (06-15-18 @ 10:45)  Wt(kg): --    PHYSICAL EXAM:  General: alert, no acute distress  Eyes:  anicteric, no conjunctival injection, no discharge  Oropharynx: no lesions or injection 	  Neck: supple, without adenopathy  Lungs: clear to auscultation  Heart: s1s2 2/6 sys m  Abdomen: soft, nondistended, nontender, without mass or organomegaly. vac  Skin: right thigh 2 black lesions  Extremities:vac over right thigh and right knee dressed  Neurologic: alert, oriented, moves all extremities    LAB RESULTS:                        7.8    6.91  )-----------( 241      ( 15 Ludwin 2018 07:48 )             26.0     06-15    142  |  103  |  29<H>  ----------------------------<  116<H>  4.7   |  29  |  0.50    Ca    9.3      15 Ludwin 2018 05:53          MICROBIOLOGY:  RECENT CULTURES:  06-13 @ 19:06 .Tissue Other, Thigh skin     Testing in progress    No WBC's seen. per low power field  No organisms seen per oil power field        Assessment:  Patient with chronic wound over right knee with spacer on suppressive minocycline, multiple areas of skin and fat necrosis with possible pyoderma gangrenosa or maybe a panniculitis syndrome, acute mild appendicitis being managed with medical therapy doing well from that perspective  Plan:  2 more days of meropenem  continue lifelong minocycline  continue local wound care per plastics, ortho and wound care team  Derm input appreciated, await skin biopsy

## 2018-06-15 NOTE — PROGRESS NOTE ADULT - SUBJECTIVE AND OBJECTIVE BOX
Patient is a 77y old  Female who presents with a chief complaint of fever, lethargy (07 Jun 2018 22:19)      SUBJECTIVE / OVERNIGHT EVENTS:    Patient seen and examined.       Vital Signs Last 24 Hrs  T(C): 36.5 (15 Ludwin 2018 05:03), Max: 36.7 (15 Ludwin 2018 01:24)  T(F): 97.7 (15 Ludwin 2018 05:03), Max: 98.1 (15 Ludwin 2018 01:24)  HR: 63 (15 Ludwin 2018 05:03) (63 - 97)  BP: 132/73 (15 Ludwin 2018 05:03) (132/73 - 137/67)  BP(mean): --  RR: 16 (15 Ludwin 2018 05:03) (16 - 18)  SpO2: 100% (15 Ludwin 2018 05:03) (96% - 100%)  I&O's Summary    14 Jun 2018 07:01  -  15 Ludwin 2018 07:00  --------------------------------------------------------  IN: 960 mL / OUT: 330 mL / NET: 630 mL        PE:  GENERAL: NAD, AAOx3, obese  HEAD:  Atraumatic, Normocephalic  EYES: EOMI, PERRLA, conjunctiva and sclera clear  NECK: Supple, No JVD  CHEST/LUNG: CTABL, No wheeze  HEART: Regular rate and rhythm; no murmur  ABDOMEN: Soft, Nontender, Nondistended; Bowel sounds present  EXTREMITIES:  2+ Peripheral Pulses, right knee eschar, abd vac and right thigh vac, right medial thigh dressing, left lateral thigh dressing  SKIN: No rashes or lesions  NEURO: No focal deficits    LABS:                        7.8    6.91  )-----------( 241      ( 15 Ludwin 2018 07:48 )             26.0     06-15    142  |  103  |  29<H>  ----------------------------<  116<H>  4.7   |  29  |  0.50    Ca    9.3      15 Ludwin 2018 05:53      PT/INR - ( 14 Jun 2018 08:08 )   PT: 21.6 sec;   INR: 1.89 ratio           CAPILLARY BLOOD GLUCOSE                RADIOLOGY & ADDITIONAL TESTS:    Imaging Personally Reviewed:  [x] YES  [ ] NO    Consultant(s) Notes Reviewed:  [x] YES  [ ] NO    MEDICATIONS  (STANDING):  ALPRAZolam 0.25 milliGRAM(s) Oral once  ascorbic acid 500 milliGRAM(s) Oral daily  aspirin enteric coated 81 milliGRAM(s) Oral daily  buDESOnide 160 MICROgram(s)/formoterol 4.5 MICROgram(s) Inhaler 2 Puff(s) Inhalation two times a day  calcium carbonate 1250 mG + Vitamin D (OsCal 500 + D) 1 Tablet(s) Oral three times a day  clobetasol 0.05% Ointment 1 Application(s) Topical two times a day  Dakins Solution - 1/4 Strength 1 Application(s) Topical two times a day  folic acid 1 milliGRAM(s) Oral daily  gabapentin 200 milliGRAM(s) Oral three times a day  melatonin 3 milliGRAM(s) Oral at bedtime  meropenem  IVPB 1000 milliGRAM(s) IV Intermittent every 8 hours  metoprolol tartrate 12.5 milliGRAM(s) Oral two times a day  minocycline 100 milliGRAM(s) Oral two times a day  multivitamin 1 Tablet(s) Oral daily  ondansetron Injectable 4 milliGRAM(s) IV Push once  pantoprazole    Tablet 40 milliGRAM(s) Oral before breakfast  polyethylene glycol 3350 17 Gram(s) Oral daily  simvastatin 20 milliGRAM(s) Oral at bedtime  tiotropium 18 MICROgram(s) Capsule 1 Capsule(s) Inhalation daily    MEDICATIONS  (PRN):  acetaminophen   Tablet. 650 milliGRAM(s) Oral every 6 hours PRN Mild Pain (1 - 3)  ALPRAZolam 0.25 milliGRAM(s) Oral daily PRN anxiety  bisacodyl Suppository 10 milliGRAM(s) Rectal daily PRN Constipation  HYDROmorphone   Tablet 2 milliGRAM(s) Oral every 3 hours PRN Moderate Pain (4 - 6)  HYDROmorphone  Injectable 0.5 milliGRAM(s) IV Push every 4 hours PRN Severe Pain (7 - 10)  senna 2 Tablet(s) Oral at bedtime PRN Constipation      Care Discussed with Consultants/Other Providers [x] YES  [ ] NO    HEALTH ISSUES - PROBLEM Dx:  Joint infection: Joint infection  Other problems related to medical facilities and other health care: Other problems related to medical facilities and other health care  S/P TAVR (transcatheter aortic valve replacement): S/P TAVR (transcatheter aortic valve replacement)  Acute cystitis without hematuria: Acute cystitis without hematuria  Wound infection: Wound infection  Acute appendicitis, unspecified acute appendicitis type: Acute appendicitis, unspecified acute appendicitis type  Fever, unspecified fever cause: Fever, unspecified fever cause Patient is a 77y old Female who presents with a chief complaint of fever, lethargy (07 Jun 2018 22:19)      SUBJECTIVE / OVERNIGHT EVENTS:    Patient seen and examined. pt co pins and needles in right leg.       Vital Signs Last 24 Hrs  T(C): 36.5 (15 Ludwin 2018 05:03), Max: 36.7 (15 Ludwin 2018 01:24)  T(F): 97.7 (15 Ludwin 2018 05:03), Max: 98.1 (15 Ludwin 2018 01:24)  HR: 63 (15 Ludwin 2018 05:03) (63 - 97)  BP: 132/73 (15 Ludwin 2018 05:03) (132/73 - 137/67)  BP(mean): --  RR: 16 (15 Ludwin 2018 05:03) (16 - 18)  SpO2: 100% (15 Ludwin 2018 05:03) (96% - 100%)  I&O's Summary    14 Jun 2018 07:01  -  15 Ludwin 2018 07:00  --------------------------------------------------------  IN: 960 mL / OUT: 330 mL / NET: 630 mL        PE:  GENERAL: NAD, AAOx3, obese  HEAD:  Atraumatic, Normocephalic  EYES: EOMI, PERRLA, conjunctiva and sclera clear  NECK: Supple, No JVD  CHEST/LUNG: CTABL, No wheeze  HEART: Regular rate and rhythm; no murmur  ABDOMEN: Soft, Nontender, Nondistended; Bowel sounds present  EXTREMITIES:  2+ Peripheral Pulses, right knee wound, abd vac and right thigh vac, right medial thigh dressing, left lateral thigh dressing, no ulcer on right heel  SKIN: No rashes or lesions  NEURO: No focal deficits    LABS:                        7.8    6.91  )-----------( 241      ( 15 Ludwin 2018 07:48 )             26.0     06-15    142  |  103  |  29<H>  ----------------------------<  116<H>  4.7   |  29  |  0.50    Ca    9.3      15 Ludwin 2018 05:53      PT/INR - ( 14 Jun 2018 08:08 )   PT: 21.6 sec;   INR: 1.89 ratio           CAPILLARY BLOOD GLUCOSE                RADIOLOGY & ADDITIONAL TESTS:    Imaging Personally Reviewed:  [x] YES  [ ] NO    Consultant(s) Notes Reviewed:  [x] YES  [ ] NO    MEDICATIONS  (STANDING):  ALPRAZolam 0.25 milliGRAM(s) Oral once  ascorbic acid 500 milliGRAM(s) Oral daily  aspirin enteric coated 81 milliGRAM(s) Oral daily  buDESOnide 160 MICROgram(s)/formoterol 4.5 MICROgram(s) Inhaler 2 Puff(s) Inhalation two times a day  calcium carbonate 1250 mG + Vitamin D (OsCal 500 + D) 1 Tablet(s) Oral three times a day  clobetasol 0.05% Ointment 1 Application(s) Topical two times a day  Dakins Solution - 1/4 Strength 1 Application(s) Topical two times a day  folic acid 1 milliGRAM(s) Oral daily  gabapentin 200 milliGRAM(s) Oral three times a day  melatonin 3 milliGRAM(s) Oral at bedtime  meropenem  IVPB 1000 milliGRAM(s) IV Intermittent every 8 hours  metoprolol tartrate 12.5 milliGRAM(s) Oral two times a day  minocycline 100 milliGRAM(s) Oral two times a day  multivitamin 1 Tablet(s) Oral daily  ondansetron Injectable 4 milliGRAM(s) IV Push once  pantoprazole    Tablet 40 milliGRAM(s) Oral before breakfast  polyethylene glycol 3350 17 Gram(s) Oral daily  simvastatin 20 milliGRAM(s) Oral at bedtime  tiotropium 18 MICROgram(s) Capsule 1 Capsule(s) Inhalation daily    MEDICATIONS  (PRN):  acetaminophen   Tablet. 650 milliGRAM(s) Oral every 6 hours PRN Mild Pain (1 - 3)  ALPRAZolam 0.25 milliGRAM(s) Oral daily PRN anxiety  bisacodyl Suppository 10 milliGRAM(s) Rectal daily PRN Constipation  HYDROmorphone   Tablet 2 milliGRAM(s) Oral every 3 hours PRN Moderate Pain (4 - 6)  HYDROmorphone  Injectable 0.5 milliGRAM(s) IV Push every 4 hours PRN Severe Pain (7 - 10)  senna 2 Tablet(s) Oral at bedtime PRN Constipation      Care Discussed with Consultants/Other Providers [x] YES  [ ] NO    HEALTH ISSUES - PROBLEM Dx:  Joint infection: Joint infection  Other problems related to medical facilities and other health care: Other problems related to medical facilities and other health care  S/P TAVR (transcatheter aortic valve replacement): S/P TAVR (transcatheter aortic valve replacement)  Acute cystitis without hematuria: Acute cystitis without hematuria  Wound infection: Wound infection  Acute appendicitis, unspecified acute appendicitis type: Acute appendicitis, unspecified acute appendicitis type  Fever, unspecified fever cause: Fever, unspecified fever cause

## 2018-06-16 LAB
-  AMPICILLIN/SULBACTAM: SIGNIFICANT CHANGE UP
-  CEFAZOLIN: SIGNIFICANT CHANGE UP
-  CLINDAMYCIN: SIGNIFICANT CHANGE UP
-  ERYTHROMYCIN: SIGNIFICANT CHANGE UP
-  GENTAMICIN: SIGNIFICANT CHANGE UP
-  OXACILLIN: SIGNIFICANT CHANGE UP
-  PENICILLIN: SIGNIFICANT CHANGE UP
-  RIFAMPIN: SIGNIFICANT CHANGE UP
-  TETRACYCLINE: SIGNIFICANT CHANGE UP
-  TRIMETHOPRIM/SULFAMETHOXAZOLE: SIGNIFICANT CHANGE UP
-  VANCOMYCIN: SIGNIFICANT CHANGE UP
ANION GAP SERPL CALC-SCNC: 8 MMOL/L — SIGNIFICANT CHANGE UP (ref 5–17)
BUN SERPL-MCNC: 30 MG/DL — HIGH (ref 7–23)
CALCIUM SERPL-MCNC: 9.3 MG/DL — SIGNIFICANT CHANGE UP (ref 8.4–10.5)
CHLORIDE SERPL-SCNC: 103 MMOL/L — SIGNIFICANT CHANGE UP (ref 96–108)
CO2 SERPL-SCNC: 30 MMOL/L — SIGNIFICANT CHANGE UP (ref 22–31)
CREAT SERPL-MCNC: 0.53 MG/DL — SIGNIFICANT CHANGE UP (ref 0.5–1.3)
GLUCOSE SERPL-MCNC: 99 MG/DL — SIGNIFICANT CHANGE UP (ref 70–99)
HCT VFR BLD CALC: 26 % — LOW (ref 34.5–45)
HGB BLD-MCNC: 8 G/DL — LOW (ref 11.5–15.5)
INR BLD: 2.02 RATIO — HIGH (ref 0.88–1.16)
MCHC RBC-ENTMCNC: 27.7 PG — SIGNIFICANT CHANGE UP (ref 27–34)
MCHC RBC-ENTMCNC: 30.8 GM/DL — LOW (ref 32–36)
MCV RBC AUTO: 90 FL — SIGNIFICANT CHANGE UP (ref 80–100)
METHOD TYPE: SIGNIFICANT CHANGE UP
PLATELET # BLD AUTO: 271 K/UL — SIGNIFICANT CHANGE UP (ref 150–400)
POTASSIUM SERPL-MCNC: 4.8 MMOL/L — SIGNIFICANT CHANGE UP (ref 3.5–5.3)
POTASSIUM SERPL-SCNC: 4.8 MMOL/L — SIGNIFICANT CHANGE UP (ref 3.5–5.3)
PROTHROM AB SERPL-ACNC: 23.2 SEC — HIGH (ref 10–13.1)
RBC # BLD: 2.89 M/UL — LOW (ref 3.8–5.2)
RBC # FLD: 18.4 % — HIGH (ref 10.3–14.5)
SODIUM SERPL-SCNC: 141 MMOL/L — SIGNIFICANT CHANGE UP (ref 135–145)
WBC # BLD: 9.72 K/UL — SIGNIFICANT CHANGE UP (ref 3.8–10.5)
WBC # FLD AUTO: 9.72 K/UL — SIGNIFICANT CHANGE UP (ref 3.8–10.5)

## 2018-06-16 PROCEDURE — 99233 SBSQ HOSP IP/OBS HIGH 50: CPT | Mod: GC

## 2018-06-16 RX ORDER — WARFARIN SODIUM 2.5 MG/1
1 TABLET ORAL ONCE
Qty: 0 | Refills: 0 | Status: COMPLETED | OUTPATIENT
Start: 2018-06-16 | End: 2018-06-16

## 2018-06-16 RX ADMIN — BUDESONIDE AND FORMOTEROL FUMARATE DIHYDRATE 2 PUFF(S): 160; 4.5 AEROSOL RESPIRATORY (INHALATION) at 18:54

## 2018-06-16 RX ADMIN — MINOCYCLINE HYDROCHLORIDE 100 MILLIGRAM(S): 45 TABLET, EXTENDED RELEASE ORAL at 05:21

## 2018-06-16 RX ADMIN — Medication 1 TABLET(S): at 21:13

## 2018-06-16 RX ADMIN — GABAPENTIN 200 MILLIGRAM(S): 400 CAPSULE ORAL at 05:20

## 2018-06-16 RX ADMIN — HYDROMORPHONE HYDROCHLORIDE 2 MILLIGRAM(S): 2 INJECTION INTRAMUSCULAR; INTRAVENOUS; SUBCUTANEOUS at 04:17

## 2018-06-16 RX ADMIN — HYDROMORPHONE HYDROCHLORIDE 2 MILLIGRAM(S): 2 INJECTION INTRAMUSCULAR; INTRAVENOUS; SUBCUTANEOUS at 21:43

## 2018-06-16 RX ADMIN — Medication 1 TABLET(S): at 14:15

## 2018-06-16 RX ADMIN — Medication 1 APPLICATION(S): at 15:58

## 2018-06-16 RX ADMIN — HYDROMORPHONE HYDROCHLORIDE 2 MILLIGRAM(S): 2 INJECTION INTRAMUSCULAR; INTRAVENOUS; SUBCUTANEOUS at 21:13

## 2018-06-16 RX ADMIN — HYDROMORPHONE HYDROCHLORIDE 2 MILLIGRAM(S): 2 INJECTION INTRAMUSCULAR; INTRAVENOUS; SUBCUTANEOUS at 15:18

## 2018-06-16 RX ADMIN — HYDROMORPHONE HYDROCHLORIDE 0.5 MILLIGRAM(S): 2 INJECTION INTRAMUSCULAR; INTRAVENOUS; SUBCUTANEOUS at 02:02

## 2018-06-16 RX ADMIN — Medication 12.5 MILLIGRAM(S): at 05:20

## 2018-06-16 RX ADMIN — MEROPENEM 100 MILLIGRAM(S): 1 INJECTION INTRAVENOUS at 21:24

## 2018-06-16 RX ADMIN — BUDESONIDE AND FORMOTEROL FUMARATE DIHYDRATE 2 PUFF(S): 160; 4.5 AEROSOL RESPIRATORY (INHALATION) at 05:21

## 2018-06-16 RX ADMIN — GABAPENTIN 200 MILLIGRAM(S): 400 CAPSULE ORAL at 21:13

## 2018-06-16 RX ADMIN — Medication 3 MILLIGRAM(S): at 21:19

## 2018-06-16 RX ADMIN — HYDROMORPHONE HYDROCHLORIDE 2 MILLIGRAM(S): 2 INJECTION INTRAMUSCULAR; INTRAVENOUS; SUBCUTANEOUS at 07:54

## 2018-06-16 RX ADMIN — MEROPENEM 100 MILLIGRAM(S): 1 INJECTION INTRAVENOUS at 14:15

## 2018-06-16 RX ADMIN — HYDROMORPHONE HYDROCHLORIDE 2 MILLIGRAM(S): 2 INJECTION INTRAMUSCULAR; INTRAVENOUS; SUBCUTANEOUS at 08:23

## 2018-06-16 RX ADMIN — MEROPENEM 100 MILLIGRAM(S): 1 INJECTION INTRAVENOUS at 05:21

## 2018-06-16 RX ADMIN — Medication 1 TABLET(S): at 12:02

## 2018-06-16 RX ADMIN — HYDROMORPHONE HYDROCHLORIDE 2 MILLIGRAM(S): 2 INJECTION INTRAMUSCULAR; INTRAVENOUS; SUBCUTANEOUS at 03:17

## 2018-06-16 RX ADMIN — HYDROMORPHONE HYDROCHLORIDE 2 MILLIGRAM(S): 2 INJECTION INTRAMUSCULAR; INTRAVENOUS; SUBCUTANEOUS at 15:48

## 2018-06-16 RX ADMIN — Medication 1 APPLICATION(S): at 05:21

## 2018-06-16 RX ADMIN — Medication 81 MILLIGRAM(S): at 12:05

## 2018-06-16 RX ADMIN — WARFARIN SODIUM 1 MILLIGRAM(S): 2.5 TABLET ORAL at 21:18

## 2018-06-16 RX ADMIN — PANTOPRAZOLE SODIUM 40 MILLIGRAM(S): 20 TABLET, DELAYED RELEASE ORAL at 05:21

## 2018-06-16 RX ADMIN — MINOCYCLINE HYDROCHLORIDE 100 MILLIGRAM(S): 45 TABLET, EXTENDED RELEASE ORAL at 18:54

## 2018-06-16 RX ADMIN — Medication 1 MILLIGRAM(S): at 12:02

## 2018-06-16 RX ADMIN — GABAPENTIN 200 MILLIGRAM(S): 400 CAPSULE ORAL at 14:15

## 2018-06-16 RX ADMIN — HYDROMORPHONE HYDROCHLORIDE 0.5 MILLIGRAM(S): 2 INJECTION INTRAMUSCULAR; INTRAVENOUS; SUBCUTANEOUS at 02:17

## 2018-06-16 RX ADMIN — Medication 1 APPLICATION(S): at 16:57

## 2018-06-16 RX ADMIN — Medication 1 TABLET(S): at 05:21

## 2018-06-16 RX ADMIN — SIMVASTATIN 20 MILLIGRAM(S): 20 TABLET, FILM COATED ORAL at 21:13

## 2018-06-16 RX ADMIN — Medication 12.5 MILLIGRAM(S): at 18:54

## 2018-06-16 RX ADMIN — TIOTROPIUM BROMIDE 1 CAPSULE(S): 18 CAPSULE ORAL; RESPIRATORY (INHALATION) at 12:14

## 2018-06-16 RX ADMIN — Medication 500 MILLIGRAM(S): at 12:04

## 2018-06-16 NOTE — PROGRESS NOTE ADULT - ASSESSMENT
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 with recent admission from (3/9/2018 to 6/6/2018) for TKR c/b joint infection s/p explantation with multiple wounds managed by plastic surgery who presents from rehab today for evaluation of fever, lethargy, and nausea at rehab found to be febrile here with evidence of appendicitis on CT abdomen, non-operative candidate,     # acute appendicitis  Cont Meropenem via picc 2 more days  bcx and ucx ngtd, afebrile  no surgical candidate, improving with medical management  ID c/s appreciated    # abd wound, right thigh wound, right knee wound  continue vac to abdomen, continue adaptic and vac to thigh  vac changes M/W/F  continue daily wet-to-dry dressing over open portion of knee wound  pain control  appreciate ortho and plastics recs and wound care  derm follow up  biopsy shows ulcer with mixed inflammatory infiltrate and prominent fat necrosis, not specific, PG cannot be entirely excluded  chronic minocycline    # Pain control  dilaudid2 po q4 prn, 0.5 iv prior to dressing change  bowel regimen    # chr Anemia  stable, monitor h/h closely    # S/P TAVR (transcatheter aortic valve replacement)  continue Coumadin, daily INR    # adjustment disorder with depressed mood  appreciate psych re eval  xanax prn

## 2018-06-16 NOTE — PROGRESS NOTE ADULT - SUBJECTIVE AND OBJECTIVE BOX
INTERVAL HPI/OVERNIGHT EVENTS: No changes in symptoms or appearance of ulcer of R knee per patient.  Bacterial Cx and fungal Cx still pending.    MEDICATIONS  (STANDING):  ALPRAZolam 0.25 milliGRAM(s) Oral once  ascorbic acid 500 milliGRAM(s) Oral daily  aspirin enteric coated 81 milliGRAM(s) Oral daily  buDESOnide 160 MICROgram(s)/formoterol 4.5 MICROgram(s) Inhaler 2 Puff(s) Inhalation two times a day  calcium carbonate 1250 mG + Vitamin D (OsCal 500 + D) 1 Tablet(s) Oral three times a day  clobetasol 0.05% Ointment 1 Application(s) Topical two times a day  Dakins Solution - 1/4 Strength 1 Application(s) Topical two times a day  folic acid 1 milliGRAM(s) Oral daily  gabapentin 200 milliGRAM(s) Oral three times a day  melatonin 3 milliGRAM(s) Oral at bedtime  meropenem  IVPB 1000 milliGRAM(s) IV Intermittent every 8 hours  metoprolol tartrate 12.5 milliGRAM(s) Oral two times a day  minocycline 100 milliGRAM(s) Oral two times a day  multivitamin 1 Tablet(s) Oral daily  ondansetron Injectable 4 milliGRAM(s) IV Push once  pantoprazole    Tablet 40 milliGRAM(s) Oral before breakfast  polyethylene glycol 3350 17 Gram(s) Oral daily  simvastatin 20 milliGRAM(s) Oral at bedtime  tiotropium 18 MICROgram(s) Capsule 1 Capsule(s) Inhalation daily    MEDICATIONS  (PRN):  acetaminophen   Tablet. 650 milliGRAM(s) Oral every 6 hours PRN Mild Pain (1 - 3)  ALPRAZolam 0.25 milliGRAM(s) Oral daily PRN anxiety  bisacodyl Suppository 10 milliGRAM(s) Rectal daily PRN Constipation  HYDROmorphone   Tablet 2 milliGRAM(s) Oral every 3 hours PRN Moderate Pain (4 - 6)  HYDROmorphone  Injectable 0.5 milliGRAM(s) IV Push every 4 hours PRN Severe Pain (7 - 10)  senna 2 Tablet(s) Oral at bedtime PRN Constipation      Allergies    amoxicillin (Other)    Intolerances    IV Contrast (Flushing (Skin); Nausea)      REVIEW OF SYSTEMS      General: no fevers/chills, no NS	    Skin: see HPI  	  Ophthalmologic: no eye pain or change in vision    Genitourinary: no dysuria or hematuria    Musculoskeletal: no joint pains or weakness	    Neurological:no weakness or tingling          Vital Signs Last 24 Hrs  T(C): 36.9 (16 Jun 2018 05:08), Max: 37.1 (15 Ludwin 2018 20:57)  T(F): 98.4 (16 Jun 2018 05:08), Max: 98.7 (15 Ludwin 2018 20:57)  HR: 78 (16 Jun 2018 05:08) (61 - 79)  BP: 139/69 (16 Jun 2018 05:08) (115/67 - 139/69)  BP(mean): --  RR: 16 (16 Jun 2018 05:08) (16 - 18)  SpO2: 97% (16 Jun 2018 05:08) (95% - 98%)    PHYSICAL EXAM:   The patient was alert and oriented X 3, well nourished, and in no  apparent distress.  There was no visible lymphadenopathy.  Conjunctiva were non injected  There was no clubbing or edema of extremities.    Of note on skin exam:       LABS:                        8.0    9.72  )-----------( 271      ( 16 Jun 2018 09:17 )             26.0     06-16    141  |  103  |  30<H>  ----------------------------<  99  4.8   |  30  |  0.53    Ca    9.3      16 Jun 2018 06:31      PT/INR - ( 16 Jun 2018 09:17 )   PT: 23.2 sec;   INR: 2.02 ratio               RADIOLOGY & ADDITIONAL TESTS:    ASSESSMENT AND PLAN:     Trena Das  Dermatology  Resident Physician, PGY-2 INTERVAL HPI/OVERNIGHT EVENTS: No changes in symptoms or appearance of ulcer of R knee per patient.  Bacterial Cx and fungal Cx still pending.    MEDICATIONS  (STANDING):  ALPRAZolam 0.25 milliGRAM(s) Oral once  ascorbic acid 500 milliGRAM(s) Oral daily  aspirin enteric coated 81 milliGRAM(s) Oral daily  buDESOnide 160 MICROgram(s)/formoterol 4.5 MICROgram(s) Inhaler 2 Puff(s) Inhalation two times a day  calcium carbonate 1250 mG + Vitamin D (OsCal 500 + D) 1 Tablet(s) Oral three times a day  clobetasol 0.05% Ointment 1 Application(s) Topical two times a day  Dakins Solution - 1/4 Strength 1 Application(s) Topical two times a day  folic acid 1 milliGRAM(s) Oral daily  gabapentin 200 milliGRAM(s) Oral three times a day  melatonin 3 milliGRAM(s) Oral at bedtime  meropenem  IVPB 1000 milliGRAM(s) IV Intermittent every 8 hours  metoprolol tartrate 12.5 milliGRAM(s) Oral two times a day  minocycline 100 milliGRAM(s) Oral two times a day  multivitamin 1 Tablet(s) Oral daily  ondansetron Injectable 4 milliGRAM(s) IV Push once  pantoprazole    Tablet 40 milliGRAM(s) Oral before breakfast  polyethylene glycol 3350 17 Gram(s) Oral daily  simvastatin 20 milliGRAM(s) Oral at bedtime  tiotropium 18 MICROgram(s) Capsule 1 Capsule(s) Inhalation daily    MEDICATIONS  (PRN):  acetaminophen   Tablet. 650 milliGRAM(s) Oral every 6 hours PRN Mild Pain (1 - 3)  ALPRAZolam 0.25 milliGRAM(s) Oral daily PRN anxiety  bisacodyl Suppository 10 milliGRAM(s) Rectal daily PRN Constipation  HYDROmorphone   Tablet 2 milliGRAM(s) Oral every 3 hours PRN Moderate Pain (4 - 6)  HYDROmorphone  Injectable 0.5 milliGRAM(s) IV Push every 4 hours PRN Severe Pain (7 - 10)  senna 2 Tablet(s) Oral at bedtime PRN Constipation      Allergies    amoxicillin (Other)    Intolerances    IV Contrast (Flushing (Skin); Nausea)      REVIEW OF SYSTEMS      General: no fevers/chills, no NS	    Skin: see HPI  	  Ophthalmologic: no eye pain or change in vision    Genitourinary: no dysuria or hematuria    Musculoskeletal: no joint pains or weakness	    Neurological:no weakness or tingling          Vital Signs Last 24 Hrs  T(C): 36.9 (16 Jun 2018 05:08), Max: 37.1 (15 Ludwin 2018 20:57)  T(F): 98.4 (16 Jun 2018 05:08), Max: 98.7 (15 Ludwin 2018 20:57)  HR: 78 (16 Jun 2018 05:08) (61 - 79)  BP: 139/69 (16 Jun 2018 05:08) (115/67 - 139/69)  BP(mean): --  RR: 16 (16 Jun 2018 05:08) (16 - 18)  SpO2: 97% (16 Jun 2018 05:08) (95% - 98%)    PHYSICAL EXAM:   The patient was alert and oriented X 3, well nourished, and in no  apparent distress.  There was no visible lymphadenopathy.  Conjunctiva were non injected  There was no clubbing or edema of extremities.    Of note on skin exam:  ulcers on lower abdomen, R and L thighs        LABS:                        8.0    9.72  )-----------( 271      ( 16 Jun 2018 09:17 )             26.0     06-16    141  |  103  |  30<H>  ----------------------------<  99  4.8   |  30  |  0.53    Ca    9.3      16 Jun 2018 06:31      PT/INR - ( 16 Jun 2018 09:17 )   PT: 23.2 sec;   INR: 2.02 ratio               RADIOLOGY & ADDITIONAL TESTS:    ASSESSMENT AND PLAN:     Trena Das  Dermatology  Resident Physician, PGY-2

## 2018-06-16 NOTE — PROGRESS NOTE ADULT - SUBJECTIVE AND OBJECTIVE BOX
Patient is a 77y old  Female who presents with a chief complaint of fever, lethargy (07 Jun 2018 22:19)      SUBJECTIVE / OVERNIGHT EVENTS:    Patient seen and examined.       Vital Signs Last 24 Hrs  T(C): 36.9 (16 Jun 2018 05:08), Max: 37.1 (15 Ludwin 2018 20:57)  T(F): 98.4 (16 Jun 2018 05:08), Max: 98.7 (15 Ludwin 2018 20:57)  HR: 78 (16 Jun 2018 05:08) (61 - 78)  BP: 139/69 (16 Jun 2018 05:08) (128/71 - 139/69)  BP(mean): --  RR: 16 (16 Jun 2018 05:08) (16 - 18)  SpO2: 97% (16 Jun 2018 05:08) (95% - 98%)  I&O's Summary    15 Ludwin 2018 07:01  -  16 Jun 2018 07:00  --------------------------------------------------------  IN: 240 mL / OUT: 0 mL / NET: 240 mL        PE:  GENERAL: NAD, AAOx3  HEAD:  Atraumatic, Normocephalic  EYES: EOMI, PERRLA, conjunctiva and sclera clear  NECK: Supple, No JVD  CHEST/LUNG: CTABL, No wheeze  HEART: Regular rate and rhythm; + murmur  ABDOMEN: Soft, Nontender, Nondistended; Bowel sounds present  EXTREMITIES:  2+ Peripheral Pulses, No clubbing, cyanosis, or edema  SKIN: No rashes or lesions  NEURO: No focal deficits    LABS:                        8.0    9.72  )-----------( 271      ( 16 Jun 2018 09:17 )             26.0     06-16    141  |  103  |  30<H>  ----------------------------<  99  4.8   |  30  |  0.53    Ca    9.3      16 Jun 2018 06:31      PT/INR - ( 16 Jun 2018 09:17 )   PT: 23.2 sec;   INR: 2.02 ratio           CAPILLARY BLOOD GLUCOSE                RADIOLOGY & ADDITIONAL TESTS:    Imaging Personally Reviewed:  [x] YES  [ ] NO    Consultant(s) Notes Reviewed:  [x] YES  [ ] NO    MEDICATIONS  (STANDING):  ALPRAZolam 0.25 milliGRAM(s) Oral once  ascorbic acid 500 milliGRAM(s) Oral daily  aspirin enteric coated 81 milliGRAM(s) Oral daily  buDESOnide 160 MICROgram(s)/formoterol 4.5 MICROgram(s) Inhaler 2 Puff(s) Inhalation two times a day  calcium carbonate 1250 mG + Vitamin D (OsCal 500 + D) 1 Tablet(s) Oral three times a day  clobetasol 0.05% Ointment 1 Application(s) Topical two times a day  Dakins Solution - 1/4 Strength 1 Application(s) Topical two times a day  folic acid 1 milliGRAM(s) Oral daily  gabapentin 200 milliGRAM(s) Oral three times a day  melatonin 3 milliGRAM(s) Oral at bedtime  meropenem  IVPB 1000 milliGRAM(s) IV Intermittent every 8 hours  metoprolol tartrate 12.5 milliGRAM(s) Oral two times a day  minocycline 100 milliGRAM(s) Oral two times a day  multivitamin 1 Tablet(s) Oral daily  ondansetron Injectable 4 milliGRAM(s) IV Push once  pantoprazole    Tablet 40 milliGRAM(s) Oral before breakfast  polyethylene glycol 3350 17 Gram(s) Oral daily  simvastatin 20 milliGRAM(s) Oral at bedtime  tiotropium 18 MICROgram(s) Capsule 1 Capsule(s) Inhalation daily    MEDICATIONS  (PRN):  acetaminophen   Tablet. 650 milliGRAM(s) Oral every 6 hours PRN Mild Pain (1 - 3)  ALPRAZolam 0.25 milliGRAM(s) Oral daily PRN anxiety  bisacodyl Suppository 10 milliGRAM(s) Rectal daily PRN Constipation  HYDROmorphone   Tablet 2 milliGRAM(s) Oral every 3 hours PRN Moderate Pain (4 - 6)  HYDROmorphone  Injectable 0.5 milliGRAM(s) IV Push every 4 hours PRN Severe Pain (7 - 10)  senna 2 Tablet(s) Oral at bedtime PRN Constipation      Care Discussed with Consultants/Other Providers [x] YES  [ ] NO    HEALTH ISSUES - PROBLEM Dx:  Joint infection: Joint infection  Other problems related to medical facilities and other health care: Other problems related to medical facilities and other health care  S/P TAVR (transcatheter aortic valve replacement): S/P TAVR (transcatheter aortic valve replacement)  Acute cystitis without hematuria: Acute cystitis without hematuria  Wound infection: Wound infection  Acute appendicitis, unspecified acute appendicitis type: Acute appendicitis, unspecified acute appendicitis type  Fever, unspecified fever cause: Fever, unspecified fever cause

## 2018-06-16 NOTE — PROGRESS NOTE ADULT - ASSESSMENT
78yo f PMH including HLD, GERD, Anxiety, Anemia, CAD s/p HERBERT, severe AS (s/p TAVR & PPM 12/17 [place] [employer] Model [id number]), h/o MVR, PMR on chronic steroids, asthma, OA, s/p TKR with recent joint infection s/p explant and abx spacer on 12/23/17. Dermatology was consulted for new ulcers. 76yo f PMH including HLD, GERD, Anxiety, Anemia, CAD s/p HERBERT, severe AS (s/p TAVR & PPM 12/17 [place] [employer] Model [id number]), h/o MVR, PMR on chronic steroids, asthma, OA, s/p TKR with recent joint infection s/p explant and abx spacer on 12/23/17. Dermatology was consulted for new ulcers on abdomen and thighs. 78yo f PMH including HLD, GERD, Anxiety, Anemia, CAD s/p HERBERT, severe AS (s/p TAVR & PPM 12/17 [place] [employer] Model [id number]), h/o MVR, PMR on chronic steroids, asthma, OA, s/p TKR with recent joint infection s/p explant and abx spacer on 12/23/17. Dermatology was consulted for new ulcers on abdomen and thighs.     Ulcers are not consistent with pyoderma gangrenosum.  Histology is not specific and cannot specify PG. No other process was specified by pathology.  Important to rule out infection. Tissue was also sent for culture, will look for atypical mycobacteria. Will also request stain on tissue looking for mycobacteria or other organisms.

## 2018-06-17 LAB
ANION GAP SERPL CALC-SCNC: 7 MMOL/L — SIGNIFICANT CHANGE UP (ref 5–17)
BASOPHILS # BLD AUTO: 0.04 K/UL — SIGNIFICANT CHANGE UP (ref 0–0.2)
BASOPHILS NFR BLD AUTO: 0.5 % — SIGNIFICANT CHANGE UP (ref 0–2)
BUN SERPL-MCNC: 30 MG/DL — HIGH (ref 7–23)
CALCIUM SERPL-MCNC: 9.4 MG/DL — SIGNIFICANT CHANGE UP (ref 8.4–10.5)
CHLORIDE SERPL-SCNC: 102 MMOL/L — SIGNIFICANT CHANGE UP (ref 96–108)
CO2 SERPL-SCNC: 31 MMOL/L — SIGNIFICANT CHANGE UP (ref 22–31)
CREAT SERPL-MCNC: 0.52 MG/DL — SIGNIFICANT CHANGE UP (ref 0.5–1.3)
EOSINOPHIL # BLD AUTO: 0.25 K/UL — SIGNIFICANT CHANGE UP (ref 0–0.5)
EOSINOPHIL NFR BLD AUTO: 3.2 % — SIGNIFICANT CHANGE UP (ref 0–6)
GLUCOSE SERPL-MCNC: 90 MG/DL — SIGNIFICANT CHANGE UP (ref 70–99)
HCT VFR BLD CALC: 27.7 % — LOW (ref 34.5–45)
HGB BLD-MCNC: 8.2 G/DL — LOW (ref 11.5–15.5)
IMM GRANULOCYTES NFR BLD AUTO: 0.5 % — SIGNIFICANT CHANGE UP (ref 0–1.5)
INR BLD: 1.98 RATIO — HIGH (ref 0.88–1.16)
LYMPHOCYTES # BLD AUTO: 2.26 K/UL — SIGNIFICANT CHANGE UP (ref 1–3.3)
LYMPHOCYTES # BLD AUTO: 28.7 % — SIGNIFICANT CHANGE UP (ref 13–44)
MCHC RBC-ENTMCNC: 26.9 PG — LOW (ref 27–34)
MCHC RBC-ENTMCNC: 29.6 GM/DL — LOW (ref 32–36)
MCV RBC AUTO: 90.8 FL — SIGNIFICANT CHANGE UP (ref 80–100)
MONOCYTES # BLD AUTO: 0.55 K/UL — SIGNIFICANT CHANGE UP (ref 0–0.9)
MONOCYTES NFR BLD AUTO: 7 % — SIGNIFICANT CHANGE UP (ref 2–14)
NEUTROPHILS # BLD AUTO: 4.74 K/UL — SIGNIFICANT CHANGE UP (ref 1.8–7.4)
NEUTROPHILS NFR BLD AUTO: 60.1 % — SIGNIFICANT CHANGE UP (ref 43–77)
PLATELET # BLD AUTO: 270 K/UL — SIGNIFICANT CHANGE UP (ref 150–400)
POTASSIUM SERPL-MCNC: 4.9 MMOL/L — SIGNIFICANT CHANGE UP (ref 3.5–5.3)
POTASSIUM SERPL-SCNC: 4.9 MMOL/L — SIGNIFICANT CHANGE UP (ref 3.5–5.3)
PROTHROM AB SERPL-ACNC: 22.7 SEC — HIGH (ref 10–13.1)
RBC # BLD: 3.05 M/UL — LOW (ref 3.8–5.2)
RBC # FLD: 18.6 % — HIGH (ref 10.3–14.5)
SODIUM SERPL-SCNC: 140 MMOL/L — SIGNIFICANT CHANGE UP (ref 135–145)
WBC # BLD: 7.88 K/UL — SIGNIFICANT CHANGE UP (ref 3.8–10.5)
WBC # FLD AUTO: 7.88 K/UL — SIGNIFICANT CHANGE UP (ref 3.8–10.5)

## 2018-06-17 RX ORDER — WARFARIN SODIUM 2.5 MG/1
1 TABLET ORAL ONCE
Qty: 0 | Refills: 0 | Status: COMPLETED | OUTPATIENT
Start: 2018-06-17 | End: 2018-06-17

## 2018-06-17 RX ADMIN — Medication 12.5 MILLIGRAM(S): at 18:08

## 2018-06-17 RX ADMIN — HYDROMORPHONE HYDROCHLORIDE 0.5 MILLIGRAM(S): 2 INJECTION INTRAMUSCULAR; INTRAVENOUS; SUBCUTANEOUS at 05:46

## 2018-06-17 RX ADMIN — MEROPENEM 100 MILLIGRAM(S): 1 INJECTION INTRAVENOUS at 22:26

## 2018-06-17 RX ADMIN — HYDROMORPHONE HYDROCHLORIDE 2 MILLIGRAM(S): 2 INJECTION INTRAMUSCULAR; INTRAVENOUS; SUBCUTANEOUS at 13:35

## 2018-06-17 RX ADMIN — Medication 81 MILLIGRAM(S): at 13:05

## 2018-06-17 RX ADMIN — GABAPENTIN 200 MILLIGRAM(S): 400 CAPSULE ORAL at 13:08

## 2018-06-17 RX ADMIN — MEROPENEM 100 MILLIGRAM(S): 1 INJECTION INTRAVENOUS at 05:49

## 2018-06-17 RX ADMIN — GABAPENTIN 200 MILLIGRAM(S): 400 CAPSULE ORAL at 05:49

## 2018-06-17 RX ADMIN — HYDROMORPHONE HYDROCHLORIDE 2 MILLIGRAM(S): 2 INJECTION INTRAMUSCULAR; INTRAVENOUS; SUBCUTANEOUS at 22:24

## 2018-06-17 RX ADMIN — HYDROMORPHONE HYDROCHLORIDE 0.5 MILLIGRAM(S): 2 INJECTION INTRAMUSCULAR; INTRAVENOUS; SUBCUTANEOUS at 18:39

## 2018-06-17 RX ADMIN — Medication 1 MILLIGRAM(S): at 13:05

## 2018-06-17 RX ADMIN — BUDESONIDE AND FORMOTEROL FUMARATE DIHYDRATE 2 PUFF(S): 160; 4.5 AEROSOL RESPIRATORY (INHALATION) at 18:08

## 2018-06-17 RX ADMIN — MINOCYCLINE HYDROCHLORIDE 100 MILLIGRAM(S): 45 TABLET, EXTENDED RELEASE ORAL at 18:08

## 2018-06-17 RX ADMIN — PANTOPRAZOLE SODIUM 40 MILLIGRAM(S): 20 TABLET, DELAYED RELEASE ORAL at 05:50

## 2018-06-17 RX ADMIN — MINOCYCLINE HYDROCHLORIDE 100 MILLIGRAM(S): 45 TABLET, EXTENDED RELEASE ORAL at 05:49

## 2018-06-17 RX ADMIN — Medication 500 MILLIGRAM(S): at 13:05

## 2018-06-17 RX ADMIN — Medication 1 TABLET(S): at 13:05

## 2018-06-17 RX ADMIN — POLYETHYLENE GLYCOL 3350 17 GRAM(S): 17 POWDER, FOR SOLUTION ORAL at 13:10

## 2018-06-17 RX ADMIN — SIMVASTATIN 20 MILLIGRAM(S): 20 TABLET, FILM COATED ORAL at 22:24

## 2018-06-17 RX ADMIN — TIOTROPIUM BROMIDE 1 CAPSULE(S): 18 CAPSULE ORAL; RESPIRATORY (INHALATION) at 13:08

## 2018-06-17 RX ADMIN — Medication 1 TABLET(S): at 05:49

## 2018-06-17 RX ADMIN — HYDROMORPHONE HYDROCHLORIDE 2 MILLIGRAM(S): 2 INJECTION INTRAMUSCULAR; INTRAVENOUS; SUBCUTANEOUS at 23:01

## 2018-06-17 RX ADMIN — HYDROMORPHONE HYDROCHLORIDE 2 MILLIGRAM(S): 2 INJECTION INTRAMUSCULAR; INTRAVENOUS; SUBCUTANEOUS at 13:05

## 2018-06-17 RX ADMIN — Medication 1 APPLICATION(S): at 05:49

## 2018-06-17 RX ADMIN — HYDROMORPHONE HYDROCHLORIDE 0.5 MILLIGRAM(S): 2 INJECTION INTRAMUSCULAR; INTRAVENOUS; SUBCUTANEOUS at 18:09

## 2018-06-17 RX ADMIN — HYDROMORPHONE HYDROCHLORIDE 0.5 MILLIGRAM(S): 2 INJECTION INTRAMUSCULAR; INTRAVENOUS; SUBCUTANEOUS at 06:16

## 2018-06-17 RX ADMIN — WARFARIN SODIUM 1 MILLIGRAM(S): 2.5 TABLET ORAL at 22:24

## 2018-06-17 RX ADMIN — BUDESONIDE AND FORMOTEROL FUMARATE DIHYDRATE 2 PUFF(S): 160; 4.5 AEROSOL RESPIRATORY (INHALATION) at 06:23

## 2018-06-17 RX ADMIN — Medication 3 MILLIGRAM(S): at 22:24

## 2018-06-17 RX ADMIN — GABAPENTIN 200 MILLIGRAM(S): 400 CAPSULE ORAL at 22:24

## 2018-06-17 RX ADMIN — MEROPENEM 100 MILLIGRAM(S): 1 INJECTION INTRAVENOUS at 15:04

## 2018-06-17 RX ADMIN — Medication 12.5 MILLIGRAM(S): at 05:49

## 2018-06-17 RX ADMIN — Medication 1 APPLICATION(S): at 18:05

## 2018-06-17 RX ADMIN — Medication 1 TABLET(S): at 22:24

## 2018-06-17 RX ADMIN — Medication 1 APPLICATION(S): at 18:59

## 2018-06-17 NOTE — PROGRESS NOTE ADULT - ASSESSMENT
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 with recent admission from (3/9/2018 to 6/6/2018) for TKR c/b joint infection s/p explantation with multiple wounds managed by plastic surgery who presents from rehab today for evaluation of fever, lethargy, and nausea at rehab found to be febrile here with evidence of appendicitis on CT abdomen, non-operative candidate,     # acute appendicitis  Cont Meropenem via picc  bcx and ucx ngtd, afebrile  not a surgical candidate, improving with medical management  ID c/s appreciated    # abd wound, right thigh wound, right knee wound  continue vac to abdomen, continue adaptic and vac to thigh  vac changes M/W/F  continue daily wet-to-dry dressing over open portion of knee wound  pain control  appreciate ortho and plastics recs and wound care  appreciate derm recs, unlikely PG, fu cultures  biopsy shows ulcer with mixed inflammatory infiltrate and prominent fat necrosis, not specific  chronic minocycline    # Pain control  dilaudid2 po q4 prn, 0.5 iv prior to dressing change  bowel regimen    # chr Anemia  stable, monitor h/h closely    # S/P TAVR (transcatheter aortic valve replacement)  continue Coumadin, daily INR    # adjustment disorder with depressed mood  appreciate psych re eval  xanax prn

## 2018-06-17 NOTE — PROGRESS NOTE ADULT - SUBJECTIVE AND OBJECTIVE BOX
Patient is a 77y old  Female who presents with a chief complaint of fever, lethargy (07 Jun 2018 22:19)      SUBJECTIVE / OVERNIGHT EVENTS:    Patient seen and examined. denies cp sob. no acute events.      Vital Signs Last 24 Hrs  T(C): 36.6 (17 Jun 2018 05:07), Max: 36.9 (16 Jun 2018 21:26)  T(F): 97.9 (17 Jun 2018 05:07), Max: 98.4 (16 Jun 2018 21:26)  HR: 64 (17 Jun 2018 05:07) (62 - 76)  BP: 123/70 (17 Jun 2018 05:07) (123/70 - 129/75)  BP(mean): --  RR: 18 (17 Jun 2018 05:07) (18 - 18)  SpO2: 97% (17 Jun 2018 05:07) (94% - 99%)  I&O's Summary    16 Jun 2018 07:01  -  17 Jun 2018 07:00  --------------------------------------------------------  IN: 1080 mL / OUT: 0 mL / NET: 1080 mL        PE:  GENERAL: NAD, AAOx3, obese  HEAD:  Atraumatic, Normocephalic  EYES: EOMI, PERRLA, conjunctiva and sclera clear  NECK: Supple, No JVD  CHEST/LUNG: CTABL, No wheeze  HEART: Regular rate and rhythm; no murmur  ABDOMEN: Soft, Nontender, Nondistended; Bowel sounds present  EXTREMITIES:  2+ Peripheral Pulses, right knee wound, abd vac and right thigh vac, right medial thigh dressing, left lateral thigh dressing, no ulcer on right heel  SKIN: No rashes or lesions  NEURO: No focal deficits      LABS:                        8.2    7.88  )-----------( 270      ( 17 Jun 2018 10:06 )             27.7     06-17    140  |  102  |  30<H>  ----------------------------<  90  4.9   |  31  |  0.52    Ca    9.4      17 Jun 2018 07:30      PT/INR - ( 17 Jun 2018 10:06 )   PT: 22.7 sec;   INR: 1.98 ratio           CAPILLARY BLOOD GLUCOSE                RADIOLOGY & ADDITIONAL TESTS:    Imaging Personally Reviewed:  [x] YES  [ ] NO    Consultant(s) Notes Reviewed:  [x] YES  [ ] NO    MEDICATIONS  (STANDING):  ALPRAZolam 0.25 milliGRAM(s) Oral once  ascorbic acid 500 milliGRAM(s) Oral daily  aspirin enteric coated 81 milliGRAM(s) Oral daily  buDESOnide 160 MICROgram(s)/formoterol 4.5 MICROgram(s) Inhaler 2 Puff(s) Inhalation two times a day  calcium carbonate 1250 mG + Vitamin D (OsCal 500 + D) 1 Tablet(s) Oral three times a day  clobetasol 0.05% Ointment 1 Application(s) Topical two times a day  Dakins Solution - 1/4 Strength 1 Application(s) Topical two times a day  folic acid 1 milliGRAM(s) Oral daily  gabapentin 200 milliGRAM(s) Oral three times a day  melatonin 3 milliGRAM(s) Oral at bedtime  meropenem  IVPB 1000 milliGRAM(s) IV Intermittent every 8 hours  metoprolol tartrate 12.5 milliGRAM(s) Oral two times a day  minocycline 100 milliGRAM(s) Oral two times a day  multivitamin 1 Tablet(s) Oral daily  ondansetron Injectable 4 milliGRAM(s) IV Push once  pantoprazole    Tablet 40 milliGRAM(s) Oral before breakfast  polyethylene glycol 3350 17 Gram(s) Oral daily  simvastatin 20 milliGRAM(s) Oral at bedtime  tiotropium 18 MICROgram(s) Capsule 1 Capsule(s) Inhalation daily    MEDICATIONS  (PRN):  acetaminophen   Tablet. 650 milliGRAM(s) Oral every 6 hours PRN Mild Pain (1 - 3)  bisacodyl Suppository 10 milliGRAM(s) Rectal daily PRN Constipation  HYDROmorphone   Tablet 2 milliGRAM(s) Oral every 3 hours PRN Moderate Pain (4 - 6)  HYDROmorphone  Injectable 0.5 milliGRAM(s) IV Push every 4 hours PRN Severe Pain (7 - 10)  senna 2 Tablet(s) Oral at bedtime PRN Constipation      Care Discussed with Consultants/Other Providers [x] YES  [ ] NO    HEALTH ISSUES - PROBLEM Dx:  Joint infection: Joint infection  Other problems related to medical facilities and other health care: Other problems related to medical facilities and other health care  S/P TAVR (transcatheter aortic valve replacement): S/P TAVR (transcatheter aortic valve replacement)  Acute cystitis without hematuria: Acute cystitis without hematuria  Wound infection: Wound infection  Acute appendicitis, unspecified acute appendicitis type: Acute appendicitis, unspecified acute appendicitis type  Fever, unspecified fever cause: Fever, unspecified fever cause

## 2018-06-18 LAB
ANION GAP SERPL CALC-SCNC: 12 MMOL/L — SIGNIFICANT CHANGE UP (ref 5–17)
BASOPHILS # BLD AUTO: 0.02 K/UL — SIGNIFICANT CHANGE UP (ref 0–0.2)
BASOPHILS NFR BLD AUTO: 0.3 % — SIGNIFICANT CHANGE UP (ref 0–2)
BUN SERPL-MCNC: 27 MG/DL — HIGH (ref 7–23)
CALCIUM SERPL-MCNC: 8.8 MG/DL — SIGNIFICANT CHANGE UP (ref 8.4–10.5)
CHLORIDE SERPL-SCNC: 103 MMOL/L — SIGNIFICANT CHANGE UP (ref 96–108)
CO2 SERPL-SCNC: 26 MMOL/L — SIGNIFICANT CHANGE UP (ref 22–31)
CREAT SERPL-MCNC: 0.5 MG/DL — SIGNIFICANT CHANGE UP (ref 0.5–1.3)
CULTURE RESULTS: SIGNIFICANT CHANGE UP
EOSINOPHIL # BLD AUTO: 0.03 K/UL — SIGNIFICANT CHANGE UP (ref 0–0.5)
EOSINOPHIL NFR BLD AUTO: 0.4 % — SIGNIFICANT CHANGE UP (ref 0–6)
GLUCOSE SERPL-MCNC: 110 MG/DL — HIGH (ref 70–99)
HCT VFR BLD CALC: 27.4 % — LOW (ref 34.5–45)
HGB BLD-MCNC: 8.2 G/DL — LOW (ref 11.5–15.5)
IMM GRANULOCYTES NFR BLD AUTO: 0.6 % — SIGNIFICANT CHANGE UP (ref 0–1.5)
INR BLD: 2.25 RATIO — HIGH (ref 0.88–1.16)
LYMPHOCYTES # BLD AUTO: 1.92 K/UL — SIGNIFICANT CHANGE UP (ref 1–3.3)
LYMPHOCYTES # BLD AUTO: 26.7 % — SIGNIFICANT CHANGE UP (ref 13–44)
MCHC RBC-ENTMCNC: 26.7 PG — LOW (ref 27–34)
MCHC RBC-ENTMCNC: 29.9 GM/DL — LOW (ref 32–36)
MCV RBC AUTO: 89.3 FL — SIGNIFICANT CHANGE UP (ref 80–100)
MONOCYTES # BLD AUTO: 0.38 K/UL — SIGNIFICANT CHANGE UP (ref 0–0.9)
MONOCYTES NFR BLD AUTO: 5.3 % — SIGNIFICANT CHANGE UP (ref 2–14)
NEUTROPHILS # BLD AUTO: 4.8 K/UL — SIGNIFICANT CHANGE UP (ref 1.8–7.4)
NEUTROPHILS NFR BLD AUTO: 66.7 % — SIGNIFICANT CHANGE UP (ref 43–77)
ORGANISM # SPEC MICROSCOPIC CNT: SIGNIFICANT CHANGE UP
ORGANISM # SPEC MICROSCOPIC CNT: SIGNIFICANT CHANGE UP
PLATELET # BLD AUTO: 259 K/UL — SIGNIFICANT CHANGE UP (ref 150–400)
POTASSIUM SERPL-MCNC: 5 MMOL/L — SIGNIFICANT CHANGE UP (ref 3.5–5.3)
POTASSIUM SERPL-SCNC: 5 MMOL/L — SIGNIFICANT CHANGE UP (ref 3.5–5.3)
PROTHROM AB SERPL-ACNC: 24.9 SEC — HIGH (ref 9.8–12.7)
RBC # BLD: 3.07 M/UL — LOW (ref 3.8–5.2)
RBC # FLD: 18.6 % — HIGH (ref 10.3–14.5)
SODIUM SERPL-SCNC: 141 MMOL/L — SIGNIFICANT CHANGE UP (ref 135–145)
SPECIMEN SOURCE: SIGNIFICANT CHANGE UP
WBC # BLD: 7.19 K/UL — SIGNIFICANT CHANGE UP (ref 3.8–10.5)
WBC # FLD AUTO: 7.19 K/UL — SIGNIFICANT CHANGE UP (ref 3.8–10.5)

## 2018-06-18 PROCEDURE — 97597 DBRDMT OPN WND 1ST 20 CM/<: CPT

## 2018-06-18 PROCEDURE — 99232 SBSQ HOSP IP/OBS MODERATE 35: CPT | Mod: 25

## 2018-06-18 PROCEDURE — 97605 NEG PRS WND THER DME<=50SQCM: CPT | Mod: 59

## 2018-06-18 PROCEDURE — 99233 SBSQ HOSP IP/OBS HIGH 50: CPT | Mod: GC

## 2018-06-18 RX ORDER — HYDROMORPHONE HYDROCHLORIDE 2 MG/ML
0.5 INJECTION INTRAMUSCULAR; INTRAVENOUS; SUBCUTANEOUS EVERY 4 HOURS
Qty: 0 | Refills: 0 | Status: DISCONTINUED | OUTPATIENT
Start: 2018-06-18 | End: 2018-06-25

## 2018-06-18 RX ORDER — WARFARIN SODIUM 2.5 MG/1
1.5 TABLET ORAL ONCE
Qty: 0 | Refills: 0 | Status: COMPLETED | OUTPATIENT
Start: 2018-06-18 | End: 2018-06-18

## 2018-06-18 RX ADMIN — HYDROMORPHONE HYDROCHLORIDE 2 MILLIGRAM(S): 2 INJECTION INTRAMUSCULAR; INTRAVENOUS; SUBCUTANEOUS at 11:28

## 2018-06-18 RX ADMIN — Medication 1 APPLICATION(S): at 06:14

## 2018-06-18 RX ADMIN — HYDROMORPHONE HYDROCHLORIDE 2 MILLIGRAM(S): 2 INJECTION INTRAMUSCULAR; INTRAVENOUS; SUBCUTANEOUS at 07:18

## 2018-06-18 RX ADMIN — GABAPENTIN 200 MILLIGRAM(S): 400 CAPSULE ORAL at 21:10

## 2018-06-18 RX ADMIN — GABAPENTIN 200 MILLIGRAM(S): 400 CAPSULE ORAL at 13:47

## 2018-06-18 RX ADMIN — Medication 12.5 MILLIGRAM(S): at 18:03

## 2018-06-18 RX ADMIN — TIOTROPIUM BROMIDE 1 CAPSULE(S): 18 CAPSULE ORAL; RESPIRATORY (INHALATION) at 11:17

## 2018-06-18 RX ADMIN — HYDROMORPHONE HYDROCHLORIDE 0.5 MILLIGRAM(S): 2 INJECTION INTRAMUSCULAR; INTRAVENOUS; SUBCUTANEOUS at 18:01

## 2018-06-18 RX ADMIN — Medication 3 MILLIGRAM(S): at 21:10

## 2018-06-18 RX ADMIN — Medication 1 MILLIGRAM(S): at 11:14

## 2018-06-18 RX ADMIN — HYDROMORPHONE HYDROCHLORIDE 0.5 MILLIGRAM(S): 2 INJECTION INTRAMUSCULAR; INTRAVENOUS; SUBCUTANEOUS at 06:37

## 2018-06-18 RX ADMIN — Medication 81 MILLIGRAM(S): at 11:14

## 2018-06-18 RX ADMIN — Medication 1 APPLICATION(S): at 18:05

## 2018-06-18 RX ADMIN — MEROPENEM 100 MILLIGRAM(S): 1 INJECTION INTRAVENOUS at 06:12

## 2018-06-18 RX ADMIN — Medication 500 MILLIGRAM(S): at 11:14

## 2018-06-18 RX ADMIN — HYDROMORPHONE HYDROCHLORIDE 0.5 MILLIGRAM(S): 2 INJECTION INTRAMUSCULAR; INTRAVENOUS; SUBCUTANEOUS at 06:07

## 2018-06-18 RX ADMIN — HYDROMORPHONE HYDROCHLORIDE 0.5 MILLIGRAM(S): 2 INJECTION INTRAMUSCULAR; INTRAVENOUS; SUBCUTANEOUS at 11:36

## 2018-06-18 RX ADMIN — MINOCYCLINE HYDROCHLORIDE 100 MILLIGRAM(S): 45 TABLET, EXTENDED RELEASE ORAL at 18:04

## 2018-06-18 RX ADMIN — HYDROMORPHONE HYDROCHLORIDE 2 MILLIGRAM(S): 2 INJECTION INTRAMUSCULAR; INTRAVENOUS; SUBCUTANEOUS at 07:48

## 2018-06-18 RX ADMIN — HYDROMORPHONE HYDROCHLORIDE 2 MILLIGRAM(S): 2 INJECTION INTRAMUSCULAR; INTRAVENOUS; SUBCUTANEOUS at 11:14

## 2018-06-18 RX ADMIN — Medication 1 TABLET(S): at 06:12

## 2018-06-18 RX ADMIN — HYDROMORPHONE HYDROCHLORIDE 2 MILLIGRAM(S): 2 INJECTION INTRAMUSCULAR; INTRAVENOUS; SUBCUTANEOUS at 22:06

## 2018-06-18 RX ADMIN — HYDROMORPHONE HYDROCHLORIDE 0.5 MILLIGRAM(S): 2 INJECTION INTRAMUSCULAR; INTRAVENOUS; SUBCUTANEOUS at 12:06

## 2018-06-18 RX ADMIN — BUDESONIDE AND FORMOTEROL FUMARATE DIHYDRATE 2 PUFF(S): 160; 4.5 AEROSOL RESPIRATORY (INHALATION) at 18:03

## 2018-06-18 RX ADMIN — Medication 1 TABLET(S): at 13:47

## 2018-06-18 RX ADMIN — Medication 1 TABLET(S): at 11:14

## 2018-06-18 RX ADMIN — Medication 12.5 MILLIGRAM(S): at 06:11

## 2018-06-18 RX ADMIN — POLYETHYLENE GLYCOL 3350 17 GRAM(S): 17 POWDER, FOR SOLUTION ORAL at 13:47

## 2018-06-18 RX ADMIN — GABAPENTIN 200 MILLIGRAM(S): 400 CAPSULE ORAL at 06:12

## 2018-06-18 RX ADMIN — HYDROMORPHONE HYDROCHLORIDE 0.5 MILLIGRAM(S): 2 INJECTION INTRAMUSCULAR; INTRAVENOUS; SUBCUTANEOUS at 18:31

## 2018-06-18 RX ADMIN — HYDROMORPHONE HYDROCHLORIDE 2 MILLIGRAM(S): 2 INJECTION INTRAMUSCULAR; INTRAVENOUS; SUBCUTANEOUS at 21:08

## 2018-06-18 RX ADMIN — BUDESONIDE AND FORMOTEROL FUMARATE DIHYDRATE 2 PUFF(S): 160; 4.5 AEROSOL RESPIRATORY (INHALATION) at 06:12

## 2018-06-18 RX ADMIN — Medication 1 TABLET(S): at 21:10

## 2018-06-18 RX ADMIN — Medication 1 APPLICATION(S): at 06:12

## 2018-06-18 RX ADMIN — PANTOPRAZOLE SODIUM 40 MILLIGRAM(S): 20 TABLET, DELAYED RELEASE ORAL at 06:12

## 2018-06-18 RX ADMIN — WARFARIN SODIUM 1.5 MILLIGRAM(S): 2.5 TABLET ORAL at 21:10

## 2018-06-18 RX ADMIN — MINOCYCLINE HYDROCHLORIDE 100 MILLIGRAM(S): 45 TABLET, EXTENDED RELEASE ORAL at 06:12

## 2018-06-18 RX ADMIN — SIMVASTATIN 20 MILLIGRAM(S): 20 TABLET, FILM COATED ORAL at 21:10

## 2018-06-18 NOTE — PROGRESS NOTE ADULT - ASSESSMENT
no plan for orthopedic surgical intervention per family and patient preference  local wound care to knee and vac therapy to abd/thigh per plastics and wound care teams  PO abx per ID team  encourage patient to spend majority of bed oob in bedside chair as able  PT to help mobilize, she must remain 50% wb on the RLE and NO KNEE MOTION allowed, knee immobilizer if oob  coumadin, inr goal 2-3  continue to optimize nutrition  continue to involve psych  nonop plan for appendicitis per gen surg, wbc improved  derm working up leg lesions, not felt to be pyoderma granulosum

## 2018-06-18 NOTE — CHART NOTE - NSCHARTNOTEFT_GEN_A_CORE
Source: Patient [X ]    Family [ ]     other [ X] medical record, RN . HHA at bedside    Diet : Regular, Howard 2 packets/daily via PEG to provide an additional 160 Kcal and 28 Gm amino acids, Nepro-2 per day (Provides additional 850kcal, 38grams protein per day)      Pt seen for malnutrition follow up. Medical chart reviewed/events noted. Per chart, pt remains acute on IV antibiotic therapy. Wound vac dressing scheduled for change today. Pt reports good appetite today, consuming >75 of meals. Pt reports having both Nepro and Howard mixed in orange juice daily. Obtained food preferences. Pt denies GI distress at this time, BM yesterday.      PO intake:  < 50% [ ] 50-75% [ ]   % [ X]  other :     Source for PO intake [X ] Patient [ ] family [ ] chart [ ] staff [ X] other: HHA    Current Weight: No new weight to assess    Pertinent Medications: MEDICATIONS  (STANDING):  ALPRAZolam 0.25 milliGRAM(s) Oral once  ascorbic acid 500 milliGRAM(s) Oral daily  aspirin enteric coated 81 milliGRAM(s) Oral daily  buDESOnide 160 MICROgram(s)/formoterol 4.5 MICROgram(s) Inhaler 2 Puff(s) Inhalation two times a day  calcium carbonate 1250 mG + Vitamin D (OsCal 500 + D) 1 Tablet(s) Oral three times a day  clobetasol 0.05% Ointment 1 Application(s) Topical two times a day  folic acid 1 milliGRAM(s) Oral daily  gabapentin 200 milliGRAM(s) Oral three times a day  melatonin 3 milliGRAM(s) Oral at bedtime  metoprolol tartrate 12.5 milliGRAM(s) Oral two times a day  minocycline 100 milliGRAM(s) Oral two times a day  multivitamin 1 Tablet(s) Oral daily  ondansetron Injectable 4 milliGRAM(s) IV Push once  pantoprazole    Tablet 40 milliGRAM(s) Oral before breakfast  polyethylene glycol 3350 17 Gram(s) Oral daily  simvastatin 20 milliGRAM(s) Oral at bedtime  tiotropium 18 MICROgram(s) Capsule 1 Capsule(s) Inhalation daily  warfarin 1.5 milliGRAM(s) Oral once    MEDICATIONS  (PRN):  acetaminophen   Tablet. 650 milliGRAM(s) Oral every 6 hours PRN Mild Pain (1 - 3)  bisacodyl Suppository 10 milliGRAM(s) Rectal daily PRN Constipation  HYDROmorphone   Tablet 2 milliGRAM(s) Oral every 3 hours PRN Moderate Pain (4 - 6)  HYDROmorphone  Injectable 0.5 milliGRAM(s) IV Push every 4 hours PRN Severe Pain (7 - 10)  senna 2 Tablet(s) Oral at bedtime PRN Constipation    Pertinent Labs:  06-18 Na141 mmol/L Glu 110 mg/dL<H> K+ 5.0 mmol/L Cr  0.50 mg/dL BUN 27 mg/dL<H>      Skin: No edema. multiple wounds noted.     Estimated Needs:   [X ] no change since previous assessment  [ ] recalculated:       Previous Nutrition Diagnosis:    [ X] Malnutrition, moderate         Nutrition Diagnosis is [X ] ongoing- improving with adequate PO intake and nutrition supplements          New Nutrition Diagnosis: [X ] not applicable          Recommend  1) Continue to provide Nepro-2 per day (Provides additional 850kcal, 38grams protein per day) and  Howard 2 packets/daily to provide an additional 160 Kcal and 28 Gm amino acids to support collagen formation   2) RD to provide food preferences       Monitoring and Evaluation:     1.Continue to monitor weight, po intake, labs, and GI tolerance  2. Encourage adequate po intake   3. RD remains available.   Valerie Solares RD pager #[phone]

## 2018-06-18 NOTE — PROGRESS NOTE ADULT - SUBJECTIVE AND OBJECTIVE BOX
Patient is a 77y old  Female who presents with a chief complaint of fever, lethargy (07 Jun 2018 22:19)      SUBJECTIVE / OVERNIGHT EVENTS:    Patient seen and examined. co pain this morning bc dakins applied on left biopsy site. denies cp sob.       Vital Signs Last 24 Hrs  T(C): 36.8 (18 Jun 2018 06:11), Max: 36.8 (17 Jun 2018 14:15)  T(F): 98.3 (18 Jun 2018 06:11), Max: 98.3 (17 Jun 2018 14:15)  HR: 61 (18 Jun 2018 06:11) (61 - 66)  BP: 137/80 (18 Jun 2018 06:11) (113/71 - 138/58)  BP(mean): --  RR: 18 (18 Jun 2018 06:11) (18 - 18)  SpO2: 99% (18 Jun 2018 06:11) (96% - 99%)  I&O's Summary    17 Jun 2018 07:01  -  18 Jun 2018 07:00  --------------------------------------------------------  IN: 570 mL / OUT: 0 mL / NET: 570 mL        PE:  GENERAL: NAD, AAOx3, obese  HEAD:  Atraumatic, Normocephalic  EYES: EOMI, PERRLA, conjunctiva and sclera clear  NECK: Supple, No JVD  CHEST/LUNG: CTABL, No wheeze  HEART: Regular rate and rhythm; no murmur  ABDOMEN: Soft, Nontender, Nondistended; Bowel sounds present  EXTREMITIES:  2+ Peripheral Pulses, right knee wound, abd vac and right anterior high vac and right posterior thigh vac, left lateral thigh dressing previous biopsy site, left medial dressing most recent biopsy site  NEURO: No focal deficits      LABS:                        8.2    7.19  )-----------( 259      ( 18 Jun 2018 07:56 )             27.4     06-18    141  |  103  |  27<H>  ----------------------------<  110<H>  5.0   |  26  |  0.50    Ca    8.8      18 Jun 2018 07:10      PT/INR - ( 17 Jun 2018 10:06 )   PT: 22.7 sec;   INR: 1.98 ratio           CAPILLARY BLOOD GLUCOSE                RADIOLOGY & ADDITIONAL TESTS:    Imaging Personally Reviewed:  [x] YES  [ ] NO    Consultant(s) Notes Reviewed:  [x] YES  [ ] NO    MEDICATIONS  (STANDING):  ALPRAZolam 0.25 milliGRAM(s) Oral once  ascorbic acid 500 milliGRAM(s) Oral daily  aspirin enteric coated 81 milliGRAM(s) Oral daily  buDESOnide 160 MICROgram(s)/formoterol 4.5 MICROgram(s) Inhaler 2 Puff(s) Inhalation two times a day  calcium carbonate 1250 mG + Vitamin D (OsCal 500 + D) 1 Tablet(s) Oral three times a day  clobetasol 0.05% Ointment 1 Application(s) Topical two times a day  folic acid 1 milliGRAM(s) Oral daily  gabapentin 200 milliGRAM(s) Oral three times a day  melatonin 3 milliGRAM(s) Oral at bedtime  metoprolol tartrate 12.5 milliGRAM(s) Oral two times a day  minocycline 100 milliGRAM(s) Oral two times a day  multivitamin 1 Tablet(s) Oral daily  ondansetron Injectable 4 milliGRAM(s) IV Push once  pantoprazole    Tablet 40 milliGRAM(s) Oral before breakfast  polyethylene glycol 3350 17 Gram(s) Oral daily  simvastatin 20 milliGRAM(s) Oral at bedtime  tiotropium 18 MICROgram(s) Capsule 1 Capsule(s) Inhalation daily  warfarin 1.5 milliGRAM(s) Oral once    MEDICATIONS  (PRN):  acetaminophen   Tablet. 650 milliGRAM(s) Oral every 6 hours PRN Mild Pain (1 - 3)  bisacodyl Suppository 10 milliGRAM(s) Rectal daily PRN Constipation  HYDROmorphone   Tablet 2 milliGRAM(s) Oral every 3 hours PRN Moderate Pain (4 - 6)  HYDROmorphone  Injectable 0.5 milliGRAM(s) IV Push every 4 hours PRN Severe Pain (7 - 10)  senna 2 Tablet(s) Oral at bedtime PRN Constipation      Care Discussed with Consultants/Other Providers [x] YES  [ ] NO    HEALTH ISSUES - PROBLEM Dx:  Joint infection: Joint infection  Other problems related to medical facilities and other health care: Other problems related to medical facilities and other health care  S/P TAVR (transcatheter aortic valve replacement): S/P TAVR (transcatheter aortic valve replacement)  Acute cystitis without hematuria: Acute cystitis without hematuria  Wound infection: Wound infection  Acute appendicitis, unspecified acute appendicitis type: Acute appendicitis, unspecified acute appendicitis type  Fever, unspecified fever cause: Fever, unspecified fever cause

## 2018-06-18 NOTE — PROGRESS NOTE ADULT - ASSESSMENT
Morbid obesity.RA,OA,asthma,PMR,s/p TAVR ,PPM, and ? MVR  Failed rt TNR, explant, strep was initial pathogen, MRSA more recent one.  S/P joint explant.  PG  has been a concern, s/p 2 biopsies of skin, path not suggestive.  Her AFB knee cultures in December were negative, May 23rd tissue AFB culture negative as well.All 3 AFB smears were negative.  MCN targets MRSA  S/P 1 week of meropenem  No signs of uncontrolled infection  Continue MCN, reviewed with Dr Blevins and daughter at bedside Morbid obesity.RA,OA,asthma,PMR,s/p TAVR ,PPM, and ? MVR  Failed rt TNR, explant, strep was initial pathogen, MRSA more recent one.  S/P joint explant.  PG  has been a concern, s/p 2 biopsies of skin, path not suggestive.  Her AFB knee cultures in December were negative, May 23rd tissue AFB culture negative as well.All 3 AFB smears were negative.  MCN targets MRSA  S/P 1 week of meropenem  No signs of uncontrolled infection  Continue MCN, reviewed with Dr Marrero and daughter at bedside

## 2018-06-18 NOTE — PROGRESS NOTE ADULT - ASSESSMENT
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 with recent admission from (3/9/2018 to 6/6/2018) for TKR c/b joint infection s/p explantation with multiple wounds managed by plastic surgery who presents from rehab today for evaluation of fever, lethargy, and nausea at rehab found to be febrile here with evidence of appendicitis on CT abdomen, non-operative candidate,     # acute appendicitis  Completed Meropenem  bcx and ucx ngtd, afebrile  ID c/s appreciated    # abd wound, right thigh wound, right knee wound  continue vac to abdomen, right thigh  vac changes M/W/F  clobetasol cream knee, left medial thigh per derm  dw wound care and will reevaluate local wound care recs  pain control  appreciate derm recs, unlikely PG  slow improvement of wounds  chronic minocycline    # Pain control  dilaudid2 po q4 prn, 0.5 iv prior to dressing change  bowel regimen    # chr Anemia  stable, monitor h/h closely    # S/P TAVR (transcatheter aortic valve replacement)  continue Coumadin, daily INR    # adjustment disorder with depressed mood  appreciate psych re eval  xanax prn 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 [place] [employer] Model [id number], MVR, 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 with recent admission from (3/9/2018 to 6/6/2018) for TKR c/b joint infection s/p explantation with multiple wounds managed by plastic surgery who presents from rehab today for evaluation of fever, lethargy, and nausea at rehab found to be febrile here with evidence of appendicitis on CT abdomen, non-operative candidate,     # acute appendicitis  Completed Meropenem  bcx and ucx ngtd, afebrile  ID c/s appreciated    # abd wound, right thigh wound, right knee wound  continue vac to abdomen, right thigh  vac changes M/W/F  clobetasol cream knee, left medial thigh per derm  dw wound care and will reevaluate local wound care recs  pain control  appreciate derm recs, unlikely PG  slow improvement of wounds  chronic minocycline    # Pain control  dilaudid2 po q4 prn, 0.5 iv prior to dressing change  bowel regimen    # chr Anemia  stable, monitor h/h closely    # S/P TAVR (transcatheter aortic valve replacement)  continue Coumadin, daily INR    # adjustment disorder with depressed mood  appreciate psych re eval  xanax prn    plan of care dw patient and daughter at bedside  dw derm and wound care and NP

## 2018-06-18 NOTE — PROGRESS NOTE ADULT - ASSESSMENT
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    Multiple wounds Abdomen Bilateral thighs w/ new wounds on buttocks- no gross signs of infection    VAC to RLQ abdominal wound and Rt anterior thigh & Lt lateral thigh wounds  Multiple smaller wounds- Medihoney or Aquacel dressings- dressing order placed    Rt knee w/ skin dehiscence - per plastics and ortho  Derm  & Pain mngt consult appreciated  Abx per Medicine/ ID  Gen SUrg/ Plastics/ Ortho consults appreciated  Order placed for ENvella by darshana- awaiting arrival  con't offloading  con't Nuturition  con't Pericare  Con't per Medicine  F/u as outpatient in Wound Center [address] [facility] [phone]  s/w team and d/w attending  Janki Cramer PA-C [id number] 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    Multiple wounds Abdomen Bilateral thighs w/ new wounds on buttocks- no gross signs of infection    VAC to RLQ abdominal wound and Rt anterior thigh & Lt lateral thigh wounds  Multiple smaller wounds- Medihoney or Aquacel dressings- dressing order placed    Rt knee w/ skin dehiscence - per plastics and ortho  Derm  & Pain mngt consult appreciated  Abx per Medicine/ ID  Gen SUrg/ Plastics/ Ortho consults appreciated  Order placed for ENvella by darshana- awaiting arrival  con't offloading  con't Nuturition  con't Pericare  Con't per Medicine  F/u as outpatient in Wound Center [address] [facility] [phone]  s/w Medicine team and d/w attending  Janki Cramer PA-C [id number]  I spent  35 minutes w/ this pt of which more than 50% of the time was spent counseling & coordinating care of this pt.

## 2018-06-18 NOTE — PROGRESS NOTE ADULT - SUBJECTIVE AND OBJECTIVE BOX
CC: f/u for appendicitis and right knee PJI    Patient reports feeling okay physically but depressed over condition.No abdominal pain or fever.Skin biopsy is not suggestive of PG.    REVIEW OF SYSTEMS:  All other review of systems negative (Comprehensive ROS)    Antimicrobials Day #  :long term  minocycline 100 milliGRAM(s) Oral two times a day    Other Medications Reviewed    T(F): 98.3 (06-18-18 @ 06:11), Max: 98.3 (06-17-18 @ 14:15)  HR: 61 (06-18-18 @ 06:11)  BP: 137/80 (06-18-18 @ 06:11)  RR: 18 (06-18-18 @ 06:11)  SpO2: 99% (06-18-18 @ 06:11)  Wt(kg): --    PHYSICAL EXAM:  General: alert, no acute distress  Eyes:  anicteric, no conjunctival injection, no discharge  Oropharynx: no lesions or injection 	  Neck: supple, without adenopathy  Lungs: clear to auscultation  Heart: regular rate and rhythm; no murmur, rubs or gallops  Abdomen: soft, nondistended, nontender, without mass or organomegaly, wound vac in lower abdomin and right thigh, rt knee and left thigh dressed  Skin: no lesions  Extremities: no clubbing, cyanosis, or edema  Neurologic: alert, oriented, moves all extremities    LAB RESULTS:                        8.2    7.19  )-----------( 259      ( 18 Jun 2018 07:56 )             27.4     06-18    141  |  103  |  27<H>  ----------------------------<  110<H>  5.0   |  26  |  0.50    Ca    8.8      18 Jun 2018 07:10          MICROBIOLOGY:  RECENT CULTURES:  06-13 @ 19:06 .Tissue Other, Thigh skin Coag Negative Staphylococcus    Testing in progress    No WBC's seen. per low power field  No organisms seen per oil power field    tissue AFB culture from Dec 2017 and May 23rd  2018 are negative    RADIOLOGY REVIEWED:    < from: CT Abdomen and Pelvis w/ IV Cont (06.07.18 @ 15:06) >  IMPRESSION:   Findings most suggestive of tip appendicitis.  Skin defect of the lower anterior abdominal wall out associated fluid   collection.  No acute abnormality within the chest.    Findings discussed with DR. VAZQUEZ in the ER on 6/7/2018 5:40 PM with   read back.

## 2018-06-18 NOTE — PROGRESS NOTE ADULT - ASSESSMENT
76yo f PMH including HLD, GERD, Anxiety, Anemia, CAD s/p HERBERT, severe AS (s/p TAVR & PPM 12/17 [place] [employer] Model [id number]), h/o MVR, PMR on chronic steroids, asthma, OA, s/p TKR with recent joint infection s/p explant and abx spacer on 12/23/17. Dermatology was consulted for new ulcers on abdomen and thighs.     Ulcers are not consistent with pyoderma gangrenosum.  Histology is not specific and cannot specify PG. No other process was specified by pathology.  Important to rule out infection. Tissue was also sent for culture, will look for atypical mycobacteria. Will also request stain on tissue looking for mycobacteria or other organisms.    Additional subq specimen from R thigh was sent for culture to rule out atypical mycobacteria.            Gilda Castillo MD  Resident Physician, PGY-3  Department of Dermatology  [facility]  Pager: [phone]  Office: [phone]

## 2018-06-18 NOTE — PROGRESS NOTE ADULT - SUBJECTIVE AND OBJECTIVE BOX
INTERVAL HPI/OVERNIGHT EVENTS: No changes in symptoms or appearance of ulcers on lower extremities.        MEDICATIONS  (STANDING):  ALPRAZolam 0.25 milliGRAM(s) Oral once  ascorbic acid 500 milliGRAM(s) Oral daily  aspirin enteric coated 81 milliGRAM(s) Oral daily  buDESOnide 160 MICROgram(s)/formoterol 4.5 MICROgram(s) Inhaler 2 Puff(s) Inhalation two times a day  calcium carbonate 1250 mG + Vitamin D (OsCal 500 + D) 1 Tablet(s) Oral three times a day  clobetasol 0.05% Ointment 1 Application(s) Topical two times a day  folic acid 1 milliGRAM(s) Oral daily  gabapentin 200 milliGRAM(s) Oral three times a day  melatonin 3 milliGRAM(s) Oral at bedtime  metoprolol tartrate 12.5 milliGRAM(s) Oral two times a day  minocycline 100 milliGRAM(s) Oral two times a day  multivitamin 1 Tablet(s) Oral daily  ondansetron Injectable 4 milliGRAM(s) IV Push once  pantoprazole    Tablet 40 milliGRAM(s) Oral before breakfast  polyethylene glycol 3350 17 Gram(s) Oral daily  simvastatin 20 milliGRAM(s) Oral at bedtime  tiotropium 18 MICROgram(s) Capsule 1 Capsule(s) Inhalation daily  warfarin 1.5 milliGRAM(s) Oral once    MEDICATIONS  (PRN):  acetaminophen   Tablet. 650 milliGRAM(s) Oral every 6 hours PRN Mild Pain (1 - 3)  bisacodyl Suppository 10 milliGRAM(s) Rectal daily PRN Constipation  HYDROmorphone   Tablet 2 milliGRAM(s) Oral every 3 hours PRN Moderate Pain (4 - 6)  HYDROmorphone  Injectable 0.5 milliGRAM(s) IV Push every 4 hours PRN Severe Pain (7 - 10)  senna 2 Tablet(s) Oral at bedtime PRN Constipation      Allergies    amoxicillin (Other)    Intolerances    IV Contrast (Flushing (Skin); Nausea)      REVIEW OF SYSTEMS      General: no fevers/chills, no NS	    Skin: see HPI  	  Ophthalmologic: no eye pain or change in vision    Genitourinary: no dysuria or hematuria    Musculoskeletal: no joint pains or weakness	    Neurological:no weakness or tingling          Vital Signs Last 24 Hrs  T(C): 36.7 (18 Jun 2018 14:15), Max: 36.8 (17 Jun 2018 22:20)  T(F): 98.1 (18 Jun 2018 14:15), Max: 98.3 (18 Jun 2018 06:11)  HR: 64 (18 Jun 2018 14:15) (61 - 64)  BP: 118/62 (18 Jun 2018 14:15) (118/62 - 138/58)  BP(mean): --  RR: 18 (18 Jun 2018 14:15) (18 - 18)  SpO2: 99% (18 Jun 2018 14:15) (96% - 99%)    PHYSICAL EXAM:   The patient was alert and oriented X 3, well nourished, and in no  apparent distress.  There was no visible lymphadenopathy.  Conjunctiva were non injected  There was no clubbing or edema of extremities.  There was no hyperhidrosis or bromhidrosis.    Of note on skin exam: ulcers deep to the subcutaneous level on R buttock and L thigh      LABS:                        8.2    7.19  )-----------( 259      ( 18 Jun 2018 07:56 )             27.4     06-18    141  |  103  |  27<H>  ----------------------------<  110<H>  5.0   |  26  |  0.50    Ca    8.8      18 Jun 2018 07:10      PT/INR - ( 18 Jun 2018 16:14 )   PT: 24.9 sec;   INR: 2.25 ratio

## 2018-06-18 NOTE — PROGRESS NOTE ADULT - SUBJECTIVE AND OBJECTIVE BOX
Plastic Surgery Progress Note (pg [facility]: [id number], [facility]: [phone])    SUBJECTIVE:  The patient was seen and examined. No acute events overnight.    OBJECTIVE:     ** VITAL SIGNS / I&O's **    Vital Signs Last 24 Hrs  T(C): 36.7 (18 Jun 2018 14:15), Max: 36.8 (17 Jun 2018 22:20)  T(F): 98.1 (18 Jun 2018 14:15), Max: 98.3 (18 Jun 2018 06:11)  HR: 64 (18 Jun 2018 14:15) (61 - 64)  BP: 118/62 (18 Jun 2018 14:15) (118/62 - 138/58)  BP(mean): --  RR: 18 (18 Jun 2018 14:15) (18 - 18)  SpO2: 99% (18 Jun 2018 14:15) (96% - 99%)      17 Jun 2018 07:01  -  18 Jun 2018 07:00  --------------------------------------------------------  IN:    Oral Fluid: 520 mL    Solution: 50 mL  Total IN: 570 mL    OUT:  Total OUT: 0 mL    Total NET: 570 mL      18 Jun 2018 07:01  -  18 Jun 2018 16:01  --------------------------------------------------------  IN:    Oral Fluid: 600 mL  Total IN: 600 mL    OUT:  Total OUT: 0 mL    Total NET: 600 mL          ** PHYSICAL EXAM **    -- CONSTITUTIONAL: Alert, NAD   -- ABD: VAC intact set to suction  -- Ext: VAC intact holding suction, knee dressing c/d/i, changed by attending prior to exam      ** LABS **                          8.2    7.19  )-----------( 259      ( 18 Jun 2018 07:56 )             27.4     18 Jun 2018 07:10    141    |  103    |  27     ----------------------------<  110    5.0     |  26     |  0.50     Ca    8.8        18 Jun 2018 07:10      PT/INR - ( 17 Jun 2018 10:06 )   PT: 22.7 sec;   INR: 1.98 ratio           CAPILLARY BLOOD GLUCOSE                    ** MEDICATIONS **  MEDICATIONS  (STANDING):  ALPRAZolam 0.25 milliGRAM(s) Oral once  ascorbic acid 500 milliGRAM(s) Oral daily  aspirin enteric coated 81 milliGRAM(s) Oral daily  buDESOnide 160 MICROgram(s)/formoterol 4.5 MICROgram(s) Inhaler 2 Puff(s) Inhalation two times a day  calcium carbonate 1250 mG + Vitamin D (OsCal 500 + D) 1 Tablet(s) Oral three times a day  clobetasol 0.05% Ointment 1 Application(s) Topical two times a day  folic acid 1 milliGRAM(s) Oral daily  gabapentin 200 milliGRAM(s) Oral three times a day  melatonin 3 milliGRAM(s) Oral at bedtime  metoprolol tartrate 12.5 milliGRAM(s) Oral two times a day  minocycline 100 milliGRAM(s) Oral two times a day  multivitamin 1 Tablet(s) Oral daily  ondansetron Injectable 4 milliGRAM(s) IV Push once  pantoprazole    Tablet 40 milliGRAM(s) Oral before breakfast  polyethylene glycol 3350 17 Gram(s) Oral daily  simvastatin 20 milliGRAM(s) Oral at bedtime  tiotropium 18 MICROgram(s) Capsule 1 Capsule(s) Inhalation daily  warfarin 1.5 milliGRAM(s) Oral once    MEDICATIONS  (PRN):  acetaminophen   Tablet. 650 milliGRAM(s) Oral every 6 hours PRN Mild Pain (1 - 3)  bisacodyl Suppository 10 milliGRAM(s) Rectal daily PRN Constipation  HYDROmorphone   Tablet 2 milliGRAM(s) Oral every 3 hours PRN Moderate Pain (4 - 6)  HYDROmorphone  Injectable 0.5 milliGRAM(s) IV Push every 4 hours PRN Severe Pain (7 - 10)  senna 2 Tablet(s) Oral at bedtime PRN Constipation

## 2018-06-18 NOTE — PROGRESS NOTE ADULT - SUBJECTIVE AND OBJECTIVE BOX
pain controlled  vac changed today    abdominal wound - vac  RLE  thigh - vac  knee wound with stable fibrinous base, dressed by prs  remainder of wounds dressed by wound care  5/5 ta/ehl/gcs  silt l4-s1  2+ dp pulse

## 2018-06-18 NOTE — PROGRESS NOTE ADULT - SUBJECTIVE AND OBJECTIVE BOX
SUBJECTIVE: Pt seen, chart reviewed.  Pt's daughter & HHA at bedside.  Pt was premedicated.  After dressing change pt noted less pain today.  Pt overall improving otherwise- abx for appendicitis  S/p Derm bx- path noted    No odor, redness, warmth, f/c/s noted  drainage managed by dressings	    ROS skin / msk see HPI   all other systems negative      aspirin enteric coated 81 milliGRAM(s) Oral daily  minocycline 100 milliGRAM(s) Oral two times a day  warfarin 1.5 milliGRAM(s) Oral once      Physical Exam:  Vital Signs Last 24 Hrs  T(C): 36.8 (18 Jun 2018 06:11), Max: 36.8 (17 Jun 2018 14:15)  T(F): 98.3 (18 Jun 2018 06:11), Max: 98.3 (17 Jun 2018 14:15)  HR: 61 (18 Jun 2018 06:11) (61 - 66)  BP: 137/80 (18 Jun 2018 06:11) (113/71 - 138/58)  BP(mean): --  RR: 18 (18 Jun 2018 06:11) (18 - 18)  SpO2: 99% (18 Jun 2018 06:11) (96% - 99%)    General Appearance:    NAD /  A&Ox3  MO/ frail/ Disheveled   Versa Care P500 bed    Musculoskeletal/Vascular:  BLE edema  BLE equally warm no acute ischemia noted  no deformities/ contractures  Rt knee wound per plastics    Skin:  dry, frail,  ecchymosis w/o hematoma    pt is tender when just gently touching skin- therefore pt was premedicated prior to assessment    Multiple wounds- see measurements in A&I sections- placed by wound PT    RLQ abdomen wound w/ moist & granular tissue no odor  small amount of nonviable tissue on the right lateral wall loosely adherent    Procedure Note  Using aseptic technique abdominal wall wound was excisionally debrided using a scissor and forceps through nonviable dermis wound is down to subcutaneous & adipose tissue.  Pt tolerated procedure well.  Hemostasis was maintained throughout.       Rt anterior thigh wound w/ moist & granular tissue no odor  (+)serosanguinous drainage & tender  No erythema, odor, increased warmth, induration, fluctuance    Lt lateral thigh wound  w/pale moist & granular  tissue   (+)serosanguinous  drainage  (+)tender    no odor  No erythema, increased warmth, induration, fluctuance    Lt lateral posterior thigh (inferior)  Partial thickness moist granular wound  (+)serosanguinous drainage & tender  No erythema,  odor, increased warmth, induration, fluctuance    Lt  Upper medial inner thigh   moist &granular w/ fibrinous exudate  (+)serosanguinous drainage  (+)tender (+)faint malodor  No erythema, increased warmth, induration, fluctuance    Rt superior posterolateral w/ new upper thigh small wound opened w/ liquifaction necrosis drainage  wound irrigated clear w/ NS irrigation    Rt inferior posterolateral wound moist & granular tissue  (+)tender   w/o odor  (+) liquefaction necrosis drainage  No erythema, increased warmth, induration, fluctuance    Bilateral buttocks (ot over glenys prominences) now w/ <1cm areas of purple hyperpigmentation  BLE thighs w/ small <1cm firmly attached dry eschars w/o signs of infection  No drainage  No odor, erythema, increased warmth, tenderness, induration, fluctuance      LABS:                        8.2    7.19  )-----------( 259      ( 18 Jun 2018 07:56 )             27.4     PT/INR - ( 17 Jun 2018 10:06 )   PT: 22.7 sec;   INR: 1.98 ratio             RADIOLOGY & ADDITIONAL STUDIES:  Surgical Pathology Report (06.13.18 @ 16:30)    Surgical Pathology Report:   ACCESSION No:  [id number]    IRASEMA KOHLER                     1    Surgical Final Report  Final Diagnosis  Left thigh, punch biopsy  - Ulcer with a mixed inflammatory infiltrate and prominent  fat necrosis (see note).    Note: Nodefinitive fungal organisms are detected on PAS/D and  GMS stains. The findings in this sample are not  specific. However, pyoderma gangrenosum cannot be entirely  excluded. Clinical correlation is required.    Verified by: Romain José M.D., Dermatopathologist  (Electronic Signature)  Reported on: 06/15/18 15:41 EDT,[address], [address], [place] [address]  _________________________________________________________________    Clinical History  77 yr old female with slowly healing ulcers on abdomen, thighs  and buttocks; r/o pyoderma gangrenosum; previous biopsy on an  ulcer performed on 5/23/18    Specimen(s) Submitted  1     Left thigh    Gross Description  1. The specimen is received in formalin and the specimen  container is labeled: left thigh. It consists of a 0.5 x 0.3 x  0.1 cm shave biopsy of tan skin, with two smaller pale tan soft  fragments of tissue (each measuring 0.3 x 0.2 x 0.2 cm). The base  of the skin specimen is inked black. The skin shave is bisected.  Entirely submitted in one cassette (bisected skin shave and two  smaller fragments of soft tissue).    In addition to other data that may appear on the specimen  container, the label has been inspected to confirm the presence  of the patient's name and date of birth.  /06/13/18 18:24      CULTURES:  Culture - Tissue with Gram Stain (06.13.18 @ 19:06)    -  Ampicillin/Sulbactam: R <=8/4    -  Cefazolin: R <=4    -  Clindamycin: R >4    -  Erythromycin: R >4    -  Gentamicin: S <=1 Should not be used as monotherapy    -  Oxacillin: R >2    -  RIF- Rifampin: S <=1 Should not be used as monotherapy    -  Tetra/Doxy: R >8    -  Trimethoprim/Sulfamethoxazole: S <=0.5/9.5    -  Vancomycin: S 2    Gram Stain:   No WBC's seen. per low power field  No organisms seen per oil power field    -  Penicillin: R >8    Specimen Source: .Tissue Other, Thigh skin    Culture Results:   Growth in fluid media only Coag Negative Staphylococcus    Organism Identification: Coag Negative Staphylococcus    Organism: Coag Negative Staphylococcus    Method Type: EDWARD SUBJECTIVE: Pt seen, chart reviewed.  Pt's daughter & HHA at bedside.  Pt was premedicated.  After dressing change pt noted less pain today.  Pt overall improving otherwise- abx for appendicitis  S/p Derm bx- path noted    No odor, redness, warmth, f/c/s noted  drainage managed by dressings	    ROS skin / msk see HPI   all other systems negative      aspirin enteric coated 81 milliGRAM(s) Oral daily  minocycline 100 milliGRAM(s) Oral two times a day  warfarin 1.5 milliGRAM(s) Oral once      Physical Exam:  Vital Signs Last 24 Hrs  T(C): 36.8 (18 Jun 2018 06:11), Max: 36.8 (17 Jun 2018 14:15)  T(F): 98.3 (18 Jun 2018 06:11), Max: 98.3 (17 Jun 2018 14:15)  HR: 61 (18 Jun 2018 06:11) (61 - 66)  BP: 137/80 (18 Jun 2018 06:11) (113/71 - 138/58)  BP(mean): --  RR: 18 (18 Jun 2018 06:11) (18 - 18)  SpO2: 99% (18 Jun 2018 06:11) (96% - 99%)    General Appearance:    NAD /  A&Ox3  MO/ frail/ Disheveled   Envella    Musculoskeletal/Vascular:  BLE edema  BLE equally warm no acute ischemia noted  no deformities/ contractures  Rt knee wound per plastics    Skin:  dry, frail,  ecchymosis w/o hematoma    pt is tender when just gently touching skin- therefore pt was premedicated prior to assessment    Multiple wounds- see measurements in A&I sections- placed by wound PT    RLQ abdomen wound w/ moist & granular tissue no odor  small amount of nonviable tissue on the right lateral wall loosely adherent    Procedure Note  Using aseptic technique abdominal wall wound was excisionally debrided using a scissor and forceps through nonviable dermis wound is down to subcutaneous & adipose tissue.  Pt tolerated procedure well.  Hemostasis was maintained throughout.       Rt anterior thigh wound w/ moist & granular tissue no odor  (+)serosanguinous drainage & tender  No erythema, odor, increased warmth, induration, fluctuance    Lt lateral thigh wound  w/pale moist & granular  tissue   (+)serosanguinous  drainage  (+)tender    no odor  No erythema, increased warmth, induration, fluctuance    Lt lateral posterior thigh (inferior)  Partial thickness moist granular wound  (+)serosanguinous drainage & tender  No erythema,  odor, increased warmth, induration, fluctuance    Lt  Upper medial inner thigh   moist &granular w/ fibrinous exudate  (+)serosanguinous drainage  (+)tender (+)faint malodor  No erythema, increased warmth, induration, fluctuance    Rt superior posterolateral w/ new upper thigh small wound opened w/ liquifaction necrosis drainage  wound irrigated clear w/ NS irrigation    Rt inferior posterolateral wound moist & granular tissue  (+)tender   w/o odor  (+) liquefaction necrosis drainage  No erythema, increased warmth, induration, fluctuance    Bilateral buttocks (ot over glenys prominences) now w/ <1cm areas of purple hyperpigmentation  BLE thighs w/ small <1cm firmly attached dry eschars w/o signs of infection  No drainage  No odor, erythema, increased warmth, tenderness, induration, fluctuance      LABS:                        8.2    7.19  )-----------( 259      ( 18 Jun 2018 07:56 )             27.4     PT/INR - ( 17 Jun 2018 10:06 )   PT: 22.7 sec;   INR: 1.98 ratio             RADIOLOGY & ADDITIONAL STUDIES:  Surgical Pathology Report (06.13.18 @ 16:30)    Surgical Pathology Report:   ACCESSION No:  10 [id number]    IRASEMA KOHLER                     1    Surgical Final Report  Final Diagnosis  Left thigh, punch biopsy  - Ulcer with a mixed inflammatory infiltrate and prominent  fat necrosis (see note).    Note: Nodefinitive fungal organisms are detected on PAS/D and  GMS stains. The findings in this sample are not  specific. However, pyoderma gangrenosum cannot be entirely  excluded. Clinical correlation is required.    Verified by: Romain José M.D., Dermatopathologist  (Electronic Signature)  Reported on: 06/15/18 15:41 EDT,[address], [address], [place] [address]  _________________________________________________________________    Clinical History  77 yr old female with slowly healing ulcers on abdomen, thighs  and buttocks; r/o pyoderma gangrenosum; previous biopsy on an  ulcer performed on 5/23/18    Specimen(s) Submitted  1     Left thigh    Gross Description  1. The specimen is received in formalin and the specimen  container is labeled: left thigh. It consists of a 0.5 x 0.3 x  0.1 cm shave biopsy of tan skin, with two smaller pale tan soft  fragments of tissue (each measuring 0.3 x 0.2 x 0.2 cm). The base  of the skin specimen is inked black. The skin shave is bisected.  Entirely submitted in one cassette (bisected skin shave and two  smaller fragments of soft tissue).    In addition to other data that may appear on the specimen  container, the label has been inspected to confirm the presence  of the patient's name and date of birth.  /06/13/18 18:24      CULTURES:  Culture - Tissue with Gram Stain (06.13.18 @ 19:06)    -  Ampicillin/Sulbactam: R <=8/4    -  Cefazolin: R <=4    -  Clindamycin: R >4    -  Erythromycin: R >4    -  Gentamicin: S <=1 Should not be used as monotherapy    -  Oxacillin: R >2    -  RIF- Rifampin: S <=1 Should not be used as monotherapy    -  Tetra/Doxy: R >8    -  Trimethoprim/Sulfamethoxazole: S <=0.5/9.5    -  Vancomycin: S 2    Gram Stain:   No WBC's seen. per low power field  No organisms seen per oil power field    -  Penicillin: R >8    Specimen Source: .Tissue Other, Thigh skin    Culture Results:   Growth in fluid media only Coag Negative Staphylococcus    Organism Identification: Coag Negative Staphylococcus    Organism: Coag Negative Staphylococcus    Method Type: EDWARD

## 2018-06-19 LAB
ANION GAP SERPL CALC-SCNC: 12 MMOL/L — SIGNIFICANT CHANGE UP (ref 5–17)
BUN SERPL-MCNC: 28 MG/DL — HIGH (ref 7–23)
CALCIUM SERPL-MCNC: 8.7 MG/DL — SIGNIFICANT CHANGE UP (ref 8.4–10.5)
CHLORIDE SERPL-SCNC: 103 MMOL/L — SIGNIFICANT CHANGE UP (ref 96–108)
CO2 SERPL-SCNC: 26 MMOL/L — SIGNIFICANT CHANGE UP (ref 22–31)
CREAT SERPL-MCNC: 0.49 MG/DL — LOW (ref 0.5–1.3)
GLUCOSE SERPL-MCNC: 101 MG/DL — HIGH (ref 70–99)
HCT VFR BLD CALC: 26.8 % — LOW (ref 34.5–45)
HGB BLD-MCNC: 7.7 G/DL — LOW (ref 11.5–15.5)
INR BLD: 2.12 RATIO — HIGH (ref 0.88–1.16)
MCHC RBC-ENTMCNC: 25.8 PG — LOW (ref 27–34)
MCHC RBC-ENTMCNC: 28.7 GM/DL — LOW (ref 32–36)
MCV RBC AUTO: 89.6 FL — SIGNIFICANT CHANGE UP (ref 80–100)
NIGHT BLUE STAIN TISS: SIGNIFICANT CHANGE UP
PLATELET # BLD AUTO: 264 K/UL — SIGNIFICANT CHANGE UP (ref 150–400)
POTASSIUM SERPL-MCNC: 5 MMOL/L — SIGNIFICANT CHANGE UP (ref 3.5–5.3)
POTASSIUM SERPL-SCNC: 5 MMOL/L — SIGNIFICANT CHANGE UP (ref 3.5–5.3)
PROTHROM AB SERPL-ACNC: 24.3 SEC — HIGH (ref 10–13.1)
RBC # BLD: 2.99 M/UL — LOW (ref 3.8–5.2)
RBC # FLD: 18.8 % — HIGH (ref 10.3–14.5)
SODIUM SERPL-SCNC: 141 MMOL/L — SIGNIFICANT CHANGE UP (ref 135–145)
SPECIMEN SOURCE: SIGNIFICANT CHANGE UP
WBC # BLD: 7.3 K/UL — SIGNIFICANT CHANGE UP (ref 3.8–10.5)
WBC # FLD AUTO: 7.3 K/UL — SIGNIFICANT CHANGE UP (ref 3.8–10.5)

## 2018-06-19 RX ORDER — HYDROMORPHONE HYDROCHLORIDE 2 MG/ML
2 INJECTION INTRAMUSCULAR; INTRAVENOUS; SUBCUTANEOUS
Qty: 0 | Refills: 0 | Status: DISCONTINUED | OUTPATIENT
Start: 2018-06-19 | End: 2018-06-25

## 2018-06-19 RX ORDER — WARFARIN SODIUM 2.5 MG/1
1.5 TABLET ORAL ONCE
Qty: 0 | Refills: 0 | Status: COMPLETED | OUTPATIENT
Start: 2018-06-19 | End: 2018-06-19

## 2018-06-19 RX ADMIN — Medication 1 MILLIGRAM(S): at 13:00

## 2018-06-19 RX ADMIN — Medication 1 TABLET(S): at 12:57

## 2018-06-19 RX ADMIN — BUDESONIDE AND FORMOTEROL FUMARATE DIHYDRATE 2 PUFF(S): 160; 4.5 AEROSOL RESPIRATORY (INHALATION) at 17:45

## 2018-06-19 RX ADMIN — Medication 3 MILLIGRAM(S): at 21:24

## 2018-06-19 RX ADMIN — HYDROMORPHONE HYDROCHLORIDE 2 MILLIGRAM(S): 2 INJECTION INTRAMUSCULAR; INTRAVENOUS; SUBCUTANEOUS at 22:00

## 2018-06-19 RX ADMIN — HYDROMORPHONE HYDROCHLORIDE 0.5 MILLIGRAM(S): 2 INJECTION INTRAMUSCULAR; INTRAVENOUS; SUBCUTANEOUS at 05:10

## 2018-06-19 RX ADMIN — Medication 12.5 MILLIGRAM(S): at 05:53

## 2018-06-19 RX ADMIN — MINOCYCLINE HYDROCHLORIDE 100 MILLIGRAM(S): 45 TABLET, EXTENDED RELEASE ORAL at 17:45

## 2018-06-19 RX ADMIN — HYDROMORPHONE HYDROCHLORIDE 0.5 MILLIGRAM(S): 2 INJECTION INTRAMUSCULAR; INTRAVENOUS; SUBCUTANEOUS at 23:00

## 2018-06-19 RX ADMIN — HYDROMORPHONE HYDROCHLORIDE 0.5 MILLIGRAM(S): 2 INJECTION INTRAMUSCULAR; INTRAVENOUS; SUBCUTANEOUS at 22:26

## 2018-06-19 RX ADMIN — MINOCYCLINE HYDROCHLORIDE 100 MILLIGRAM(S): 45 TABLET, EXTENDED RELEASE ORAL at 05:53

## 2018-06-19 RX ADMIN — HYDROMORPHONE HYDROCHLORIDE 2 MILLIGRAM(S): 2 INJECTION INTRAMUSCULAR; INTRAVENOUS; SUBCUTANEOUS at 15:56

## 2018-06-19 RX ADMIN — Medication 1 APPLICATION(S): at 05:52

## 2018-06-19 RX ADMIN — BUDESONIDE AND FORMOTEROL FUMARATE DIHYDRATE 2 PUFF(S): 160; 4.5 AEROSOL RESPIRATORY (INHALATION) at 05:52

## 2018-06-19 RX ADMIN — Medication 1 TABLET(S): at 21:23

## 2018-06-19 RX ADMIN — GABAPENTIN 200 MILLIGRAM(S): 400 CAPSULE ORAL at 21:24

## 2018-06-19 RX ADMIN — HYDROMORPHONE HYDROCHLORIDE 2 MILLIGRAM(S): 2 INJECTION INTRAMUSCULAR; INTRAVENOUS; SUBCUTANEOUS at 02:33

## 2018-06-19 RX ADMIN — HYDROMORPHONE HYDROCHLORIDE 2 MILLIGRAM(S): 2 INJECTION INTRAMUSCULAR; INTRAVENOUS; SUBCUTANEOUS at 16:57

## 2018-06-19 RX ADMIN — HYDROMORPHONE HYDROCHLORIDE 0.5 MILLIGRAM(S): 2 INJECTION INTRAMUSCULAR; INTRAVENOUS; SUBCUTANEOUS at 04:54

## 2018-06-19 RX ADMIN — Medication 81 MILLIGRAM(S): at 12:56

## 2018-06-19 RX ADMIN — HYDROMORPHONE HYDROCHLORIDE 2 MILLIGRAM(S): 2 INJECTION INTRAMUSCULAR; INTRAVENOUS; SUBCUTANEOUS at 01:40

## 2018-06-19 RX ADMIN — WARFARIN SODIUM 1.5 MILLIGRAM(S): 2.5 TABLET ORAL at 21:24

## 2018-06-19 RX ADMIN — Medication 12.5 MILLIGRAM(S): at 17:45

## 2018-06-19 RX ADMIN — GABAPENTIN 200 MILLIGRAM(S): 400 CAPSULE ORAL at 12:56

## 2018-06-19 RX ADMIN — HYDROMORPHONE HYDROCHLORIDE 2 MILLIGRAM(S): 2 INJECTION INTRAMUSCULAR; INTRAVENOUS; SUBCUTANEOUS at 10:41

## 2018-06-19 RX ADMIN — PANTOPRAZOLE SODIUM 40 MILLIGRAM(S): 20 TABLET, DELAYED RELEASE ORAL at 05:53

## 2018-06-19 RX ADMIN — HYDROMORPHONE HYDROCHLORIDE 2 MILLIGRAM(S): 2 INJECTION INTRAMUSCULAR; INTRAVENOUS; SUBCUTANEOUS at 11:41

## 2018-06-19 RX ADMIN — Medication 500 MILLIGRAM(S): at 12:57

## 2018-06-19 RX ADMIN — SIMVASTATIN 20 MILLIGRAM(S): 20 TABLET, FILM COATED ORAL at 21:24

## 2018-06-19 RX ADMIN — GABAPENTIN 200 MILLIGRAM(S): 400 CAPSULE ORAL at 05:53

## 2018-06-19 RX ADMIN — HYDROMORPHONE HYDROCHLORIDE 2 MILLIGRAM(S): 2 INJECTION INTRAMUSCULAR; INTRAVENOUS; SUBCUTANEOUS at 21:24

## 2018-06-19 RX ADMIN — Medication 1 TABLET(S): at 05:53

## 2018-06-19 RX ADMIN — TIOTROPIUM BROMIDE 1 CAPSULE(S): 18 CAPSULE ORAL; RESPIRATORY (INHALATION) at 12:57

## 2018-06-19 NOTE — PROGRESS NOTE ADULT - ASSESSMENT
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 with recent admission from (3/9/2018 to 6/6/2018) for TKR c/b joint infection s/p explantation with multiple wounds managed by plastic surgery who presents from rehab today for evaluation of fever, lethargy, and nausea at rehab found to be febrile here with evidence of appendicitis on CT abdomen, non-operative candidate,     # acute appendicitis  Completed Meropenem  bcx and ucx ngtd, afebrile  ID c/s appreciated    # abd wound, multiple bl leg wounds  continue vac to abdomen, right thigh ant and posterior  vac changes M/W/F  clobetasol cream knee, left medial thigh per derm  appreciate wound care recs, orders written per wound care  cont NS irrigation, cavilon, medihoney  pain control  appreciate derm recs, unlikely PG  slow improvement of wounds  chronic minocycline    # Pain control  dilaudid2 po q4 prn, 0.5 iv prior to dressing change  bowel regimen    # chr Anemia  stable, monitor h/h closely    # S/P TAVR (transcatheter aortic valve replacement)  continue Coumadin, daily INR    # adjustment disorder with depressed mood  appreciate psych re eval  xanax prn    plan of care dw patient and daughter at bedside  dw derm and wound care and NP 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 [employer] [employer] Model [id number], MVR, 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 with recent admission from (3/9/2018 to 6/6/2018) for TKR c/b joint infection s/p explantation with multiple wounds managed by plastic surgery who presents from rehab today for evaluation of fever, lethargy, and nausea at rehab found to be febrile here with evidence of appendicitis on CT abdomen, non-operative candidate,     # acute appendicitis  Completed Meropenem  bcx and ucx ngtd, afebrile  ID c/s appreciated    # abd wound, multiple bl leg wounds  continue vac to abdomen, right thigh ant and posterior  vac changes M/W/F  appreciate wound care recs, orders written per wound care  cont NS irrigation, cavilon, medihoney  pain control  appreciate derm recs, unlikely PG, fu new subq right thigh culture to ro atypical mycobacterium, ro infectious source  yasmin derm fellow and will DC clobetasol and topical creams  slow improvement of wounds  chronic minocycline    # Pain control  dilaudid2 po q4 prn, 0.5 iv prior to dressing change  bowel regimen    # chr Anemia  stable, monitor h/h closely    # S/P TAVR (transcatheter aortic valve replacement)  continue Coumadin, daily INR    # adjustment disorder with depressed mood  appreciate psych re eval  xanax prn    plan of care dw patient   yasmin derm and NP 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 [place] [employer] Model [id number], MVR, 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 with recent admission from (3/9/2018 to 6/6/2018) for TKR c/b joint infection s/p explantation with multiple wounds managed by plastic surgery who presents from rehab today for evaluation of fever, lethargy, and nausea at rehab found to be febrile here with evidence of appendicitis on CT abdomen, non-operative candidate,     # acute appendicitis  Completed Meropenem  bcx and ucx ngtd, afebrile  ID c/s appreciated    # abd wound, multiple bl leg wounds  continue vac to abdomen, right thigh ant and posterior  vac changes M/W/F  appreciate wound care recs, orders written per wound care  cont NS irrigation, cavilon, medihoney  pain control  appreciate derm recs, unlikely PG, fu new subq right thigh culture to ro atypical mycobacterium, ro infectious source  yasmin derm fellow and will DC clobetasol and topical creams  slow improvement of wounds  chronic minocycline    # Pain control  dilaudid2 po q4 prn, 0.5 iv prior to dressing change  bowel regimen    # chr Anemia  stable, monitor h/h closely    # S/P TAVR (transcatheter aortic valve replacement)  continue Coumadin, daily INR    # adjustment disorder with depressed mood  appreciate psych re eval  xanax prn    plan of care dw patient   dw derm and NP  plan of care was discussed with Mr. 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 with recent admission from (3/9/2018 to 6/6/2018) for TKR c/b joint infection s/p explantation with multiple wounds managed by plastic surgery who presents from rehab today for evaluation of fever, lethargy, and nausea at rehab found to be febrile here with evidence of appendicitis on CT abdomen, non-operative candidate,     # acute appendicitis  Completed Meropenem  bcx and ucx ngtd, afebrile  ID c/s appreciated    # abd wound, multiple bl leg wounds  continue vac to abdomen, right thigh ant and posterior  vac changes M/W/F  appreciate wound care recs, orders written per wound care  cont NS irrigation, cavilon, medihoney  pain control  appreciate derm recs, unlikely PG, fu new subq right thigh culture to ro atypical mycobacterium, ro infectious source  tissue culture grew coag neg staph which can be normal roopa  dw derm fellow and will DC clobetasol and topical creams  slow improvement of wounds  chronic minocycline    # Pain control  dilaudid2 po q4 prn, 0.5 iv prior to dressing change  bowel regimen    # chr Anemia  stable, monitor h/h closely    # S/P TAVR (transcatheter aortic valve replacement)  continue Coumadin, daily INR    # adjustment disorder with depressed mood  appreciate psych re eval  xanax prn    plan of care dw patient   dw derm and NP  plan of care was discussed with Mr. Rdz [phone] over the phone, all questions answers, agrees with plan of care

## 2018-06-19 NOTE — PROGRESS NOTE ADULT - SUBJECTIVE AND OBJECTIVE BOX
Patient is a 77y old  Female who presents with a chief complaint of fever, lethargy (07 Jun 2018 22:19)      SUBJECTIVE / OVERNIGHT EVENTS:    Patient seen and examined.       Vital Signs Last 24 Hrs  T(C): 36.9 (19 Jun 2018 05:30), Max: 36.9 (19 Jun 2018 05:30)  T(F): 98.5 (19 Jun 2018 05:30), Max: 98.5 (19 Jun 2018 05:30)  HR: 65 (19 Jun 2018 05:30) (64 - 68)  BP: 147/78 (19 Jun 2018 05:30) (110/68 - 147/78)  BP(mean): --  RR: 18 (19 Jun 2018 05:30) (18 - 18)  SpO2: 95% (19 Jun 2018 05:30) (95% - 99%)  I&O's Summary    18 Jun 2018 07:01  -  19 Jun 2018 07:00  --------------------------------------------------------  IN: 980 mL / OUT: 0 mL / NET: 980 mL        PE:  GENERAL: NAD, AAOx3, obese  HEAD:  Atraumatic, Normocephalic  EYES: EOMI, PERRLA, conjunctiva and sclera clear  NECK: Supple, No JVD  CHEST/LUNG: CTABL, No wheeze  HEART: Regular rate and rhythm; no murmur  ABDOMEN: Soft, Nontender, Nondistended; Bowel sounds present  EXTREMITIES:  2+ Peripheral Pulses, right knee wound, abd vac and right anterior thigh and right posterior thigh vac, left lateral thigh dressing previous biopsy site, left medial dressing most recent biopsy site  NEURO: No focal deficits    LABS:                        8.2    7.19  )-----------( 259      ( 18 Jun 2018 07:56 )             27.4     06-19    141  |  103  |  28<H>  ----------------------------<  101<H>  5.0   |  26  |  0.49<L>    Ca    8.7      19 Jun 2018 05:11      PT/INR - ( 19 Jun 2018 08:06 )   PT: 24.3 sec;   INR: 2.12 ratio           CAPILLARY BLOOD GLUCOSE                RADIOLOGY & ADDITIONAL TESTS:    Imaging Personally Reviewed:  [x] YES  [ ] NO    Consultant(s) Notes Reviewed:  [x] YES  [ ] NO    MEDICATIONS  (STANDING):  ALPRAZolam 0.25 milliGRAM(s) Oral once  ascorbic acid 500 milliGRAM(s) Oral daily  aspirin enteric coated 81 milliGRAM(s) Oral daily  betamethasone valerate 0.1% Cream 1 Application(s) Topical once  buDESOnide 160 MICROgram(s)/formoterol 4.5 MICROgram(s) Inhaler 2 Puff(s) Inhalation two times a day  calcium carbonate 1250 mG + Vitamin D (OsCal 500 + D) 1 Tablet(s) Oral three times a day  clobetasol 0.05% Ointment 1 Application(s) Topical two times a day  folic acid 1 milliGRAM(s) Oral daily  gabapentin 200 milliGRAM(s) Oral three times a day  melatonin 3 milliGRAM(s) Oral at bedtime  metoprolol tartrate 12.5 milliGRAM(s) Oral two times a day  minocycline 100 milliGRAM(s) Oral two times a day  multivitamin 1 Tablet(s) Oral daily  ondansetron Injectable 4 milliGRAM(s) IV Push once  pantoprazole    Tablet 40 milliGRAM(s) Oral before breakfast  polyethylene glycol 3350 17 Gram(s) Oral daily  simvastatin 20 milliGRAM(s) Oral at bedtime  tiotropium 18 MICROgram(s) Capsule 1 Capsule(s) Inhalation daily    MEDICATIONS  (PRN):  acetaminophen   Tablet. 650 milliGRAM(s) Oral every 6 hours PRN Mild Pain (1 - 3)  bisacodyl Suppository 10 milliGRAM(s) Rectal daily PRN Constipation  HYDROmorphone   Tablet 2 milliGRAM(s) Oral every 3 hours PRN Moderate Pain (4 - 6)  HYDROmorphone  Injectable 0.5 milliGRAM(s) IV Push every 4 hours PRN Severe Pain (7 - 10)  senna 2 Tablet(s) Oral at bedtime PRN Constipation      Care Discussed with Consultants/Other Providers [x] YES  [ ] NO    HEALTH ISSUES - PROBLEM Dx:  Joint infection: Joint infection  Other problems related to medical facilities and other health care: Other problems related to medical facilities and other health care  S/P TAVR (transcatheter aortic valve replacement): S/P TAVR (transcatheter aortic valve replacement)  Acute cystitis without hematuria: Acute cystitis without hematuria  Wound infection: Wound infection  Acute appendicitis, unspecified acute appendicitis type: Acute appendicitis, unspecified acute appendicitis type  Fever, unspecified fever cause: Fever, unspecified fever cause Patient is a 77y old  Female who presents with a chief complaint of fever, lethargy (07 Jun 2018 22:19)      SUBJECTIVE / OVERNIGHT EVENTS:    Patient seen and examined. denies cp sob. co wound vac vibration annoying. explained that wound vac is important and helping wound healing.       Vital Signs Last 24 Hrs  T(C): 36.9 (19 Jun 2018 05:30), Max: 36.9 (19 Jun 2018 05:30)  T(F): 98.5 (19 Jun 2018 05:30), Max: 98.5 (19 Jun 2018 05:30)  HR: 65 (19 Jun 2018 05:30) (64 - 68)  BP: 147/78 (19 Jun 2018 05:30) (110/68 - 147/78)  BP(mean): --  RR: 18 (19 Jun 2018 05:30) (18 - 18)  SpO2: 95% (19 Jun 2018 05:30) (95% - 99%)  I&O's Summary    18 Jun 2018 07:01  -  19 Jun 2018 07:00  --------------------------------------------------------  IN: 980 mL / OUT: 0 mL / NET: 980 mL        PE:  GENERAL: NAD, AAOx3, obese  HEAD:  Atraumatic, Normocephalic  EYES: EOMI, PERRLA, conjunctiva and sclera clear  NECK: Supple, No JVD  CHEST/LUNG: CTABL, No wheeze  HEART: Regular rate and rhythm; no murmur  ABDOMEN: Soft, Nontender, Nondistended; Bowel sounds present  EXTREMITIES:  2+ Peripheral Pulses, right knee wound, abd vac and right anterior thigh and right posterior thigh vac, left lateral thigh dressing previous biopsy site, left medial dressing most recent biopsy site  NEURO: No focal deficits    LABS:                        8.2    7.19  )-----------( 259      ( 18 Jun 2018 07:56 )             27.4     06-19    141  |  103  |  28<H>  ----------------------------<  101<H>  5.0   |  26  |  0.49<L>    Ca    8.7      19 Jun 2018 05:11      PT/INR - ( 19 Jun 2018 08:06 )   PT: 24.3 sec;   INR: 2.12 ratio           CAPILLARY BLOOD GLUCOSE                RADIOLOGY & ADDITIONAL TESTS:    Imaging Personally Reviewed:  [x] YES  [ ] NO    Consultant(s) Notes Reviewed:  [x] YES  [ ] NO    MEDICATIONS  (STANDING):  ALPRAZolam 0.25 milliGRAM(s) Oral once  ascorbic acid 500 milliGRAM(s) Oral daily  aspirin enteric coated 81 milliGRAM(s) Oral daily  betamethasone valerate 0.1% Cream 1 Application(s) Topical once  buDESOnide 160 MICROgram(s)/formoterol 4.5 MICROgram(s) Inhaler 2 Puff(s) Inhalation two times a day  calcium carbonate 1250 mG + Vitamin D (OsCal 500 + D) 1 Tablet(s) Oral three times a day  clobetasol 0.05% Ointment 1 Application(s) Topical two times a day  folic acid 1 milliGRAM(s) Oral daily  gabapentin 200 milliGRAM(s) Oral three times a day  melatonin 3 milliGRAM(s) Oral at bedtime  metoprolol tartrate 12.5 milliGRAM(s) Oral two times a day  minocycline 100 milliGRAM(s) Oral two times a day  multivitamin 1 Tablet(s) Oral daily  ondansetron Injectable 4 milliGRAM(s) IV Push once  pantoprazole    Tablet 40 milliGRAM(s) Oral before breakfast  polyethylene glycol 3350 17 Gram(s) Oral daily  simvastatin 20 milliGRAM(s) Oral at bedtime  tiotropium 18 MICROgram(s) Capsule 1 Capsule(s) Inhalation daily    MEDICATIONS  (PRN):  acetaminophen   Tablet. 650 milliGRAM(s) Oral every 6 hours PRN Mild Pain (1 - 3)  bisacodyl Suppository 10 milliGRAM(s) Rectal daily PRN Constipation  HYDROmorphone   Tablet 2 milliGRAM(s) Oral every 3 hours PRN Moderate Pain (4 - 6)  HYDROmorphone  Injectable 0.5 milliGRAM(s) IV Push every 4 hours PRN Severe Pain (7 - 10)  senna 2 Tablet(s) Oral at bedtime PRN Constipation      Care Discussed with Consultants/Other Providers [x] YES  [ ] NO    HEALTH ISSUES - PROBLEM Dx:  Joint infection: Joint infection  Other problems related to medical facilities and other health care: Other problems related to medical facilities and other health care  S/P TAVR (transcatheter aortic valve replacement): S/P TAVR (transcatheter aortic valve replacement)  Acute cystitis without hematuria: Acute cystitis without hematuria  Wound infection: Wound infection  Acute appendicitis, unspecified acute appendicitis type: Acute appendicitis, unspecified acute appendicitis type  Fever, unspecified fever cause: Fever, unspecified fever cause

## 2018-06-19 NOTE — PROGRESS NOTE ADULT - SUBJECTIVE AND OBJECTIVE BOX
CC: f/u for MRSA knee infection and appendicitis    Patient reports no specific complaints.She denies any abdominal pain.Wounds as noted in wound care notes    REVIEW OF SYSTEMS:  All other review of systems negative (Comprehensive ROS)    Antimicrobials Day #  :chronic, s/p 1 week of meropenem  minocycline 100 milliGRAM(s) Oral two times a day    Other Medications Reviewed    T(F): 98.5 (06-19-18 @ 05:30), Max: 98.5 (06-19-18 @ 05:30)  HR: 65 (06-19-18 @ 05:30)  BP: 147/78 (06-19-18 @ 05:30)  RR: 18 (06-19-18 @ 05:30)  SpO2: 95% (06-19-18 @ 05:30)  Wt(kg): --    PHYSICAL EXAM:  General: alert, no acute distress  Eyes:  anicteric, no conjunctival injection, no discharge  Oropharynx: no lesions or injection 	  Neck: supple, without adenopathy  Lungs: clear to auscultation  Heart: regular rate and rhythm; no murmur, rubs or gallops  Abdomen: soft, nondistended, nontender, without mass or organomegaly  Skin: wound vac x 2, wounds dressed  Extremities: no clubbing, cyanosis, or edema  Neurologic: alert, oriented, moves all extremities  RT knee wound dressed  LAB RESULTS:                        8.2    7.19  )-----------( 259      ( 18 Jun 2018 07:56 )             27.4     06-19    141  |  103  |  28<H>  ----------------------------<  101<H>  5.0   |  26  |  0.49<L>    Ca    8.7      19 Jun 2018 05:11          MICROBIOLOGY:  RECENT CULTURES:  tissue culture few coag negative staph,wound and knee cultures are AFB negative to date    RADIOLOGY REVIEWED:    < from: CT Abdomen and Pelvis w/ IV Cont (06.07.18 @ 15:06) >  IMPRESSION:   Findings most suggestive of tip appendicitis.  Skin defect of the lower anterior abdominal wall out associated fluid   collection.  No acute abnormality within the chest.

## 2018-06-19 NOTE — PROGRESS NOTE ADULT - SUBJECTIVE AND OBJECTIVE BOX
SUBJECTIVE: Pt seen, chart reviewed.    reviewed recent events with patient and aide, Lissa  remains non ambulatory  Repeat skin Bx not felt to be consistent with PG  Recent notes reviewed    Allergies    amoxicillin (Other)    Intolerances    IV Contrast (Flushing (Skin); Nausea)      aspirin enteric coated 81 milliGRAM(s) Oral daily  minocycline 100 milliGRAM(s) Oral two times a day  warfarin 1.5 milliGRAM(s) Oral once    	    Physical Exam:  Vital Signs Last 24 Hrs  T(C): 36.9 (19 Jun 2018 05:30), Max: 36.9 (19 Jun 2018 05:30)  T(F): 98.5 (19 Jun 2018 05:30), Max: 98.5 (19 Jun 2018 05:30)  HR: 65 (19 Jun 2018 05:30) (64 - 68)  BP: 147/78 (19 Jun 2018 05:30) (110/68 - 147/78)  BP(mean): --  RR: 18 (19 Jun 2018 05:30) (18 - 18)  SpO2: 95% (19 Jun 2018 05:30) (95% - 99%)    Well groomed  Cries easily    3 of the wound are VACed  Right knee wound w2d dressing per Plastics  Lateral and medial left thigh wounds redressed- not in need of debridement, no cellulitis    Abd is non tender    remains reluctant to be OOB via [facility]- encouraged to do so    status d/w floor staff      patient encouraged to continue with current care          LABS:                        8.2    7.19  )-----------( 259      ( 18 Jun 2018 07:56 )             27.4     PT/INR - ( 19 Jun 2018 08:06 )   PT: 24.3 sec;   INR: 2.12 ratio

## 2018-06-19 NOTE — PROGRESS NOTE ADULT - ASSESSMENT
Morbid obesity.RA,OA,asthma,PMR,s/p TAVR ,PPM, and ? MVR  Failed rt TKR, explant, strep was initial pathogen, MRSA more recent pathogen  S/P joint explant.S/P plastics coverage with wound breakdown.  Multiple wounds.Admitted 6/7  with low grade fever and appendicitis seen on CT scan..S/P 1 week of meropenem.No surgery planned.  PG  has been a concern, s/p 2 biopsies of skin, path not suggestive.Derm does not feel she has PG.  Her AFB knee cultures in December were negative, May 23rd tissue AFB culture negative as well.All 3 AFB smears were negative.  MCN targets MRSA, chronic rt knee suppression even with joint explanted.  S/P 1 week of meropenem, completed 6/18  Monitor on minocycline.No clinical evidence of uncontrolled infection.

## 2018-06-20 LAB
ANION GAP SERPL CALC-SCNC: 11 MMOL/L — SIGNIFICANT CHANGE UP (ref 5–17)
BUN SERPL-MCNC: 32 MG/DL — HIGH (ref 7–23)
CALCIUM SERPL-MCNC: 8.7 MG/DL — SIGNIFICANT CHANGE UP (ref 8.4–10.5)
CHLORIDE SERPL-SCNC: 101 MMOL/L — SIGNIFICANT CHANGE UP (ref 96–108)
CO2 SERPL-SCNC: 25 MMOL/L — SIGNIFICANT CHANGE UP (ref 22–31)
CREAT SERPL-MCNC: 0.45 MG/DL — LOW (ref 0.5–1.3)
GLUCOSE SERPL-MCNC: 97 MG/DL — SIGNIFICANT CHANGE UP (ref 70–99)
HCT VFR BLD CALC: 28.4 % — LOW (ref 34.5–45)
HGB BLD-MCNC: 8.2 G/DL — LOW (ref 11.5–15.5)
INR BLD: 2.05 RATIO — HIGH (ref 0.88–1.16)
MCHC RBC-ENTMCNC: 26.1 PG — LOW (ref 27–34)
MCHC RBC-ENTMCNC: 28.9 GM/DL — LOW (ref 32–36)
MCV RBC AUTO: 90.4 FL — SIGNIFICANT CHANGE UP (ref 80–100)
PLATELET # BLD AUTO: 291 K/UL — SIGNIFICANT CHANGE UP (ref 150–400)
POTASSIUM SERPL-MCNC: 5.2 MMOL/L — SIGNIFICANT CHANGE UP (ref 3.5–5.3)
POTASSIUM SERPL-SCNC: 5.2 MMOL/L — SIGNIFICANT CHANGE UP (ref 3.5–5.3)
PROTHROM AB SERPL-ACNC: 23.5 SEC — HIGH (ref 10–13.1)
RBC # BLD: 3.14 M/UL — LOW (ref 3.8–5.2)
RBC # FLD: 19.1 % — HIGH (ref 10.3–14.5)
SODIUM SERPL-SCNC: 137 MMOL/L — SIGNIFICANT CHANGE UP (ref 135–145)
WBC # BLD: 8.65 K/UL — SIGNIFICANT CHANGE UP (ref 3.8–10.5)
WBC # FLD AUTO: 8.65 K/UL — SIGNIFICANT CHANGE UP (ref 3.8–10.5)

## 2018-06-20 RX ORDER — WARFARIN SODIUM 2.5 MG/1
1.5 TABLET ORAL ONCE
Qty: 0 | Refills: 0 | Status: COMPLETED | OUTPATIENT
Start: 2018-06-20 | End: 2018-06-20

## 2018-06-20 RX ADMIN — GABAPENTIN 200 MILLIGRAM(S): 400 CAPSULE ORAL at 05:26

## 2018-06-20 RX ADMIN — Medication 650 MILLIGRAM(S): at 17:59

## 2018-06-20 RX ADMIN — HYDROMORPHONE HYDROCHLORIDE 2 MILLIGRAM(S): 2 INJECTION INTRAMUSCULAR; INTRAVENOUS; SUBCUTANEOUS at 20:27

## 2018-06-20 RX ADMIN — HYDROMORPHONE HYDROCHLORIDE 2 MILLIGRAM(S): 2 INJECTION INTRAMUSCULAR; INTRAVENOUS; SUBCUTANEOUS at 10:30

## 2018-06-20 RX ADMIN — Medication 12.5 MILLIGRAM(S): at 05:26

## 2018-06-20 RX ADMIN — Medication 12.5 MILLIGRAM(S): at 17:58

## 2018-06-20 RX ADMIN — HYDROMORPHONE HYDROCHLORIDE 0.5 MILLIGRAM(S): 2 INJECTION INTRAMUSCULAR; INTRAVENOUS; SUBCUTANEOUS at 09:59

## 2018-06-20 RX ADMIN — GABAPENTIN 200 MILLIGRAM(S): 400 CAPSULE ORAL at 14:43

## 2018-06-20 RX ADMIN — Medication 1 TABLET(S): at 21:33

## 2018-06-20 RX ADMIN — BUDESONIDE AND FORMOTEROL FUMARATE DIHYDRATE 2 PUFF(S): 160; 4.5 AEROSOL RESPIRATORY (INHALATION) at 17:58

## 2018-06-20 RX ADMIN — HYDROMORPHONE HYDROCHLORIDE 2 MILLIGRAM(S): 2 INJECTION INTRAMUSCULAR; INTRAVENOUS; SUBCUTANEOUS at 20:57

## 2018-06-20 RX ADMIN — Medication 1 TABLET(S): at 12:22

## 2018-06-20 RX ADMIN — HYDROMORPHONE HYDROCHLORIDE 0.5 MILLIGRAM(S): 2 INJECTION INTRAMUSCULAR; INTRAVENOUS; SUBCUTANEOUS at 05:04

## 2018-06-20 RX ADMIN — HYDROMORPHONE HYDROCHLORIDE 2 MILLIGRAM(S): 2 INJECTION INTRAMUSCULAR; INTRAVENOUS; SUBCUTANEOUS at 23:37

## 2018-06-20 RX ADMIN — GABAPENTIN 200 MILLIGRAM(S): 400 CAPSULE ORAL at 21:33

## 2018-06-20 RX ADMIN — Medication 0.25 MILLIGRAM(S): at 21:33

## 2018-06-20 RX ADMIN — Medication 1 TABLET(S): at 05:26

## 2018-06-20 RX ADMIN — BUDESONIDE AND FORMOTEROL FUMARATE DIHYDRATE 2 PUFF(S): 160; 4.5 AEROSOL RESPIRATORY (INHALATION) at 05:26

## 2018-06-20 RX ADMIN — MINOCYCLINE HYDROCHLORIDE 100 MILLIGRAM(S): 45 TABLET, EXTENDED RELEASE ORAL at 17:58

## 2018-06-20 RX ADMIN — WARFARIN SODIUM 1.5 MILLIGRAM(S): 2.5 TABLET ORAL at 21:33

## 2018-06-20 RX ADMIN — HYDROMORPHONE HYDROCHLORIDE 2 MILLIGRAM(S): 2 INJECTION INTRAMUSCULAR; INTRAVENOUS; SUBCUTANEOUS at 15:21

## 2018-06-20 RX ADMIN — Medication 81 MILLIGRAM(S): at 12:22

## 2018-06-20 RX ADMIN — Medication 500 MILLIGRAM(S): at 12:22

## 2018-06-20 RX ADMIN — Medication 1 MILLIGRAM(S): at 12:22

## 2018-06-20 RX ADMIN — TIOTROPIUM BROMIDE 1 CAPSULE(S): 18 CAPSULE ORAL; RESPIRATORY (INHALATION) at 12:21

## 2018-06-20 RX ADMIN — HYDROMORPHONE HYDROCHLORIDE 0.5 MILLIGRAM(S): 2 INJECTION INTRAMUSCULAR; INTRAVENOUS; SUBCUTANEOUS at 10:15

## 2018-06-20 RX ADMIN — Medication 1 TABLET(S): at 14:43

## 2018-06-20 RX ADMIN — SIMVASTATIN 20 MILLIGRAM(S): 20 TABLET, FILM COATED ORAL at 21:33

## 2018-06-20 RX ADMIN — MINOCYCLINE HYDROCHLORIDE 100 MILLIGRAM(S): 45 TABLET, EXTENDED RELEASE ORAL at 05:26

## 2018-06-20 RX ADMIN — PANTOPRAZOLE SODIUM 40 MILLIGRAM(S): 20 TABLET, DELAYED RELEASE ORAL at 05:26

## 2018-06-20 RX ADMIN — HYDROMORPHONE HYDROCHLORIDE 0.5 MILLIGRAM(S): 2 INJECTION INTRAMUSCULAR; INTRAVENOUS; SUBCUTANEOUS at 04:34

## 2018-06-20 RX ADMIN — HYDROMORPHONE HYDROCHLORIDE 2 MILLIGRAM(S): 2 INJECTION INTRAMUSCULAR; INTRAVENOUS; SUBCUTANEOUS at 09:48

## 2018-06-20 RX ADMIN — HYDROMORPHONE HYDROCHLORIDE 2 MILLIGRAM(S): 2 INJECTION INTRAMUSCULAR; INTRAVENOUS; SUBCUTANEOUS at 14:46

## 2018-06-20 RX ADMIN — Medication 650 MILLIGRAM(S): at 18:40

## 2018-06-20 NOTE — PROGRESS NOTE ADULT - SUBJECTIVE AND OBJECTIVE BOX
CC: f/u for appendicitis and right knee infection    Patient reports no complaints, desire to go to rehab.Plastics note appreciated, knee wound without any signs of infection    REVIEW OF SYSTEMS:  All other review of systems negative (Comprehensive ROS)    Antimicrobials Day #  :long term  minocycline 100 milliGRAM(s) Oral two times a day    Other Medications Reviewed    T(F): 97.7 (06-20-18 @ 05:42), Max: 98.2 (06-19-18 @ 20:59)  HR: 60 (06-20-18 @ 05:42)  BP: 133/741 (06-20-18 @ 05:42)  RR: 18 (06-20-18 @ 05:42)  SpO2: 97% (06-20-18 @ 05:42)  Wt(kg): --    PHYSICAL EXAM:  General: alert, no acute distress  Eyes:  anicteric, no conjunctival injection, no discharge  Oropharynx: no lesions or injection 	  Neck: supple, without adenopathy  Lungs: clear to auscultation  Heart: regular rate and rhythm; no murmur, rubs or gallops  Abdomen: soft, nondistended, nontender, without mass or organomegaly.Wound vac in place  Skin: wounds dressed  Extremities: no clubbing, cyanosis, trace edema.Thigh wound with vac and knee dressed  Neurologic: alert, oriented, moves all extremities    LAB RESULTS:                        8.2    8.65  )-----------( 291      ( 20 Jun 2018 08:00 )             28.4     06-20    137  |  101  |  32<H>  ----------------------------<  97  5.2   |  25  |  0.45<L>    Ca    8.7      20 Jun 2018 06:02          MICROBIOLOGY:  RECENT CULTURES:  06-18 @ 21:01 .Tissue Other, R buttock skin             RADIOLOGY REVIEWED:

## 2018-06-20 NOTE — PROGRESS NOTE ADULT - SUBJECTIVE AND OBJECTIVE BOX
Patient is a 77y old  Female who presents with a chief complaint of fever, lethargy (07 Jun 2018 22:19)      INTERVAL HPI/OVERNIGHT EVENTS: pt 's wound vacs are being changed at bedside  pt denies any new symtoms      Vital Signs Last 24 Hrs  T(C): 36.6 (20 Jun 2018 12:00), Max: 36.8 (19 Jun 2018 20:59)  T(F): 97.9 (20 Jun 2018 12:00), Max: 98.2 (19 Jun 2018 20:59)  HR: 62 (20 Jun 2018 12:00) (60 - 66)  BP: 117/56 (20 Jun 2018 12:00) (116/68 - 140/83)  BP(mean): --  RR: 18 (20 Jun 2018 12:00) (18 - 18)  SpO2: 97% (20 Jun 2018 12:00) (96% - 97%)    acetaminophen   Tablet. 650 milliGRAM(s) Oral every 6 hours PRN  ALPRAZolam 0.25 milliGRAM(s) Oral once  ascorbic acid 500 milliGRAM(s) Oral daily  aspirin enteric coated 81 milliGRAM(s) Oral daily  bisacodyl Suppository 10 milliGRAM(s) Rectal daily PRN  buDESOnide 160 MICROgram(s)/formoterol 4.5 MICROgram(s) Inhaler 2 Puff(s) Inhalation two times a day  calcium carbonate 1250 mG + Vitamin D (OsCal 500 + D) 1 Tablet(s) Oral three times a day  folic acid 1 milliGRAM(s) Oral daily  gabapentin 200 milliGRAM(s) Oral three times a day  HYDROmorphone   Tablet 2 milliGRAM(s) Oral every 3 hours PRN  HYDROmorphone  Injectable 0.5 milliGRAM(s) IV Push every 4 hours PRN  melatonin 3 milliGRAM(s) Oral at bedtime  metoprolol tartrate 12.5 milliGRAM(s) Oral two times a day  minocycline 100 milliGRAM(s) Oral two times a day  multivitamin 1 Tablet(s) Oral daily  ondansetron Injectable 4 milliGRAM(s) IV Push once  pantoprazole    Tablet 40 milliGRAM(s) Oral before breakfast  polyethylene glycol 3350 17 Gram(s) Oral daily  senna 2 Tablet(s) Oral at bedtime PRN  simvastatin 20 milliGRAM(s) Oral at bedtime  tiotropium 18 MICROgram(s) Capsule 1 Capsule(s) Inhalation daily  warfarin 1.5 milliGRAM(s) Oral once      PHYSICAL EXAM:  GENERAL: NAD,   EYES: conjunctiva and sclera clear  ENMT: Moist mucous membranes  NECK: Supple, No JVD, Normal thyroid  NERVOUS SYSTEM:  Alert & Oriented X3,   CHEST/LUNG: Clear to auscultation bilaterally; No rales, rhonchi, wheezing, or rubs  HEART: Regular rate and rhythm; No murmurs, rubs, or gallops  ABDOMEN: Soft, Nontender, Nondistended; Bowel sounds present  EXTREMITIES:  rt knee dressing, lower abd, ant and post rt thigh wound VACS, lt lateral and medial thigh wound dressing  LYMPH: No lymphadenopathy noted  SKIN: No rashes or lesions    Consultant(s) Notes Reviewed:  [x ] YES  [ ] NO  Care Discussed with Consultants/Other Providers [ x] YES  [ ] NO    LABS:                        8.2    8.65  )-----------( 291      ( 20 Jun 2018 08:00 )             28.4     06-20    137  |  101  |  32<H>  ----------------------------<  97  5.2   |  25  |  0.45<L>    Ca    8.7      20 Jun 2018 06:02      PT/INR - ( 20 Jun 2018 08:17 )   PT: 23.5 sec;   INR: 2.05 ratio             CAPILLARY BLOOD GLUCOSE                RADIOLOGY & ADDITIONAL TESTS:    Imaging Personally Reviewed:  [ x] YES  [ ] NO

## 2018-06-20 NOTE — PROGRESS NOTE ADULT - ASSESSMENT
A/P: s/p Right total knee insertion of spacer, irrigation and debridement, complex wound closure 3/23; readmitted 6/8 for appendicitis  - cont vac change M/W/F  - c/w W2D over knee wound TID  - will cont to follow A/P: s/p Right total knee insertion of spacer, irrigation and debridement, complex wound closure 3/23; readmitted 6/8 for appendicitis  - cont vac change M/W/F  - c/w W2D over knee wound BID  - will cont to follow

## 2018-06-20 NOTE — PROGRESS NOTE ADULT - ASSESSMENT
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 with recent admission from (3/9/2018 to 6/6/2018) for TKR c/b joint infection s/p explantation with multiple wounds managed by plastic surgery who presents from rehab today for evaluation of fever, lethargy, and nausea at rehab found to be febrile here with evidence of appendicitis on CT abdomen, non-operative candidate,     # acute appendicitis  Completed Meropenem  bcx and ucx ngtd, afebrile  ID c/s appreciated    # abd wound, multiple bl leg wounds  continue vac to abdomen, right thigh ant and posterior  vac changes M/W/F  appreciate wound care recs, orders written per wound care  cont NS irrigation, cavilon, medihoney  pain control  appreciate derm recs, unlikely PG, fu new subq right thigh culture to ro atypical mycobacterium, ro infectious source  tissue culture grew coag neg staph which can be normal roopa  dw derm fellow and will DC clobetasol and topical creams  slow improvement of wounds  chronic minocycline    # Pain control  dilaudid2 po q4 prn, 0.5 iv prior to dressing change  bowel regimen    # chr Anemia  stable, monitor h/h closely    # S/P TAVR (transcatheter aortic valve replacement)  continue Coumadin, daily INR    # adjustment disorder with depressed mood  appreciate psych re eval  xanax prn    plan of care dw patient

## 2018-06-20 NOTE — PROGRESS NOTE ADULT - ASSESSMENT
Morbid obesity.RA,OA,asthma,PMR,s/p TAVR ,PPM, and ? MVR  Failed rt TKR, explant, strep was initial pathogen, MRSA more recent pathogen  S/P joint explant.S/P plastics coverage with wound breakdown.  Multiple wounds.Admitted 6/7  with low grade fever and appendicitis seen on CT scan..S/P 1 week of meropenem.No surgery planned.  PG  has been a concern, s/p 2 biopsies of skin, path not suggestive.Derm does not feel she has PG.  Her AFB knee cultures in December were negative, May 23rd tissue AFB culture negative as well.All 3 AFB smears were negative.  MCN targets MRSA, chronic rt knee suppression even with joint explanted.  S/P 1 week of meropenem, completed 6/18  Monitor on minocycline.No clinical evidence of uncontrolled infection.  Appreciate close plastics f/u  Monitor for recurrent fever or leukocytosis  disposition per multiple services

## 2018-06-20 NOTE — PROGRESS NOTE ADULT - SUBJECTIVE AND OBJECTIVE BOX
Plastic Surgery Progress Note (pg [facility]: [id number], [facility]: [phone])    SUBJECTIVE:  The patient was seen and examined. No acute events overnight.    OBJECTIVE:     ** VITAL SIGNS / I&O's **    Vital Signs Last 24 Hrs  T(C): 36.5 (20 Jun 2018 05:42), Max: 36.8 (19 Jun 2018 20:59)  T(F): 97.7 (20 Jun 2018 05:42), Max: 98.2 (19 Jun 2018 20:59)  HR: 60 (20 Jun 2018 05:42) (60 - 66)  BP: 133/741 (20 Jun 2018 05:42) (116/68 - 140/83)  BP(mean): --  RR: 18 (20 Jun 2018 05:42) (18 - 18)  SpO2: 97% (20 Jun 2018 05:42) (96% - 97%)      19 Jun 2018 07:01  -  20 Jun 2018 07:00  --------------------------------------------------------  IN:    Oral Fluid: 180 mL  Total IN: 180 mL    OUT:    Voided: 350 mL  Total OUT: 350 mL    Total NET: -170 mL          ** PHYSICAL EXAM **    -- CONSTITUTIONAL: Alert, NAD   -- ABD: VAC intact set to suction  -- Ext: VAC intact holding suction, knee wound with fibrinous base, no sign of infection, dressed with wet to dry dressing      ** LABS **                          8.2    8.65  )-----------( 291      ( 20 Jun 2018 08:00 )             28.4     20 Jun 2018 06:02    137    |  101    |  32     ----------------------------<  97     5.2     |  25     |  0.45     Ca    8.7        20 Jun 2018 06:02      PT/INR - ( 20 Jun 2018 08:17 )   PT: 23.5 sec;   INR: 2.05 ratio           CAPILLARY BLOOD GLUCOSE              Culture - Acid Fast - Tissue w/Smear (collected 18 Jun 2018 21:01)  Source: .Tissue Other, R buttock skin            ** MEDICATIONS **  MEDICATIONS  (STANDING):  ALPRAZolam 0.25 milliGRAM(s) Oral once  ascorbic acid 500 milliGRAM(s) Oral daily  aspirin enteric coated 81 milliGRAM(s) Oral daily  buDESOnide 160 MICROgram(s)/formoterol 4.5 MICROgram(s) Inhaler 2 Puff(s) Inhalation two times a day  calcium carbonate 1250 mG + Vitamin D (OsCal 500 + D) 1 Tablet(s) Oral three times a day  folic acid 1 milliGRAM(s) Oral daily  gabapentin 200 milliGRAM(s) Oral three times a day  melatonin 3 milliGRAM(s) Oral at bedtime  metoprolol tartrate 12.5 milliGRAM(s) Oral two times a day  minocycline 100 milliGRAM(s) Oral two times a day  multivitamin 1 Tablet(s) Oral daily  ondansetron Injectable 4 milliGRAM(s) IV Push once  pantoprazole    Tablet 40 milliGRAM(s) Oral before breakfast  polyethylene glycol 3350 17 Gram(s) Oral daily  simvastatin 20 milliGRAM(s) Oral at bedtime  tiotropium 18 MICROgram(s) Capsule 1 Capsule(s) Inhalation daily    MEDICATIONS  (PRN):  acetaminophen   Tablet. 650 milliGRAM(s) Oral every 6 hours PRN Mild Pain (1 - 3)  bisacodyl Suppository 10 milliGRAM(s) Rectal daily PRN Constipation  HYDROmorphone   Tablet 2 milliGRAM(s) Oral every 3 hours PRN Moderate Pain (4 - 6)  HYDROmorphone  Injectable 0.5 milliGRAM(s) IV Push every 4 hours PRN Severe Pain (7 - 10)  senna 2 Tablet(s) Oral at bedtime PRN Constipation

## 2018-06-20 NOTE — PROGRESS NOTE ADULT - ATTENDING COMMENTS
d/w dgtr Kylah at length on phone re plan of care and possible dc plan  dgtr adamant about leaving the hospital or even talk abt discharge  she would want to wait till the rpt biopsy results and waiting to know the cause of wounds.  addressed all concerns and reassurance provided

## 2018-06-21 ENCOUNTER — FORM ENCOUNTER (OUTPATIENT)
Age: 78
End: 2018-06-21

## 2018-06-21 LAB
ANION GAP SERPL CALC-SCNC: 10 MMOL/L — SIGNIFICANT CHANGE UP (ref 5–17)
BUN SERPL-MCNC: 32 MG/DL — HIGH (ref 7–23)
CALCIUM SERPL-MCNC: 9 MG/DL — SIGNIFICANT CHANGE UP (ref 8.4–10.5)
CHLORIDE SERPL-SCNC: 103 MMOL/L — SIGNIFICANT CHANGE UP (ref 96–108)
CO2 SERPL-SCNC: 26 MMOL/L — SIGNIFICANT CHANGE UP (ref 22–31)
CREAT SERPL-MCNC: 0.48 MG/DL — LOW (ref 0.5–1.3)
GLUCOSE SERPL-MCNC: 96 MG/DL — SIGNIFICANT CHANGE UP (ref 70–99)
HCT VFR BLD CALC: 27.7 % — LOW (ref 34.5–45)
HGB BLD-MCNC: 8.6 G/DL — LOW (ref 11.5–15.5)
INR BLD: 2.45 RATIO — HIGH (ref 0.88–1.16)
MCHC RBC-ENTMCNC: 27.3 PG — SIGNIFICANT CHANGE UP (ref 27–34)
MCHC RBC-ENTMCNC: 31 GM/DL — LOW (ref 32–36)
MCV RBC AUTO: 87.9 FL — SIGNIFICANT CHANGE UP (ref 80–100)
PLATELET # BLD AUTO: 261 K/UL — SIGNIFICANT CHANGE UP (ref 150–400)
POTASSIUM SERPL-MCNC: 5 MMOL/L — SIGNIFICANT CHANGE UP (ref 3.5–5.3)
POTASSIUM SERPL-SCNC: 5 MMOL/L — SIGNIFICANT CHANGE UP (ref 3.5–5.3)
PROTHROM AB SERPL-ACNC: 28.2 SEC — HIGH (ref 10–13.1)
RBC # BLD: 3.15 M/UL — LOW (ref 3.8–5.2)
RBC # FLD: 19.1 % — HIGH (ref 10.3–14.5)
SODIUM SERPL-SCNC: 139 MMOL/L — SIGNIFICANT CHANGE UP (ref 135–145)
WBC # BLD: 9.64 K/UL — SIGNIFICANT CHANGE UP (ref 3.8–10.5)
WBC # FLD AUTO: 9.64 K/UL — SIGNIFICANT CHANGE UP (ref 3.8–10.5)

## 2018-06-21 RX ORDER — WARFARIN SODIUM 2.5 MG/1
1.5 TABLET ORAL ONCE
Qty: 0 | Refills: 0 | Status: COMPLETED | OUTPATIENT
Start: 2018-06-21 | End: 2018-06-21

## 2018-06-21 RX ADMIN — GABAPENTIN 200 MILLIGRAM(S): 400 CAPSULE ORAL at 22:03

## 2018-06-21 RX ADMIN — Medication 1 TABLET(S): at 22:09

## 2018-06-21 RX ADMIN — HYDROMORPHONE HYDROCHLORIDE 2 MILLIGRAM(S): 2 INJECTION INTRAMUSCULAR; INTRAVENOUS; SUBCUTANEOUS at 00:07

## 2018-06-21 RX ADMIN — Medication 81 MILLIGRAM(S): at 13:34

## 2018-06-21 RX ADMIN — TIOTROPIUM BROMIDE 1 CAPSULE(S): 18 CAPSULE ORAL; RESPIRATORY (INHALATION) at 13:35

## 2018-06-21 RX ADMIN — GABAPENTIN 200 MILLIGRAM(S): 400 CAPSULE ORAL at 06:51

## 2018-06-21 RX ADMIN — WARFARIN SODIUM 1.5 MILLIGRAM(S): 2.5 TABLET ORAL at 22:09

## 2018-06-21 RX ADMIN — Medication 1 TABLET(S): at 13:36

## 2018-06-21 RX ADMIN — GABAPENTIN 200 MILLIGRAM(S): 400 CAPSULE ORAL at 13:34

## 2018-06-21 RX ADMIN — Medication 12.5 MILLIGRAM(S): at 06:52

## 2018-06-21 RX ADMIN — SIMVASTATIN 20 MILLIGRAM(S): 20 TABLET, FILM COATED ORAL at 22:03

## 2018-06-21 RX ADMIN — Medication 1 MILLIGRAM(S): at 13:33

## 2018-06-21 RX ADMIN — Medication 500 MILLIGRAM(S): at 13:33

## 2018-06-21 RX ADMIN — HYDROMORPHONE HYDROCHLORIDE 0.5 MILLIGRAM(S): 2 INJECTION INTRAMUSCULAR; INTRAVENOUS; SUBCUTANEOUS at 17:15

## 2018-06-21 RX ADMIN — Medication 12.5 MILLIGRAM(S): at 17:50

## 2018-06-21 RX ADMIN — PANTOPRAZOLE SODIUM 40 MILLIGRAM(S): 20 TABLET, DELAYED RELEASE ORAL at 06:51

## 2018-06-21 RX ADMIN — HYDROMORPHONE HYDROCHLORIDE 0.5 MILLIGRAM(S): 2 INJECTION INTRAMUSCULAR; INTRAVENOUS; SUBCUTANEOUS at 17:00

## 2018-06-21 RX ADMIN — MINOCYCLINE HYDROCHLORIDE 100 MILLIGRAM(S): 45 TABLET, EXTENDED RELEASE ORAL at 06:51

## 2018-06-21 RX ADMIN — MINOCYCLINE HYDROCHLORIDE 100 MILLIGRAM(S): 45 TABLET, EXTENDED RELEASE ORAL at 17:50

## 2018-06-21 RX ADMIN — HYDROMORPHONE HYDROCHLORIDE 2 MILLIGRAM(S): 2 INJECTION INTRAMUSCULAR; INTRAVENOUS; SUBCUTANEOUS at 13:48

## 2018-06-21 RX ADMIN — Medication 1 TABLET(S): at 13:33

## 2018-06-21 RX ADMIN — Medication 3 MILLIGRAM(S): at 22:04

## 2018-06-21 RX ADMIN — Medication 1 TABLET(S): at 06:51

## 2018-06-21 RX ADMIN — HYDROMORPHONE HYDROCHLORIDE 0.5 MILLIGRAM(S): 2 INJECTION INTRAMUSCULAR; INTRAVENOUS; SUBCUTANEOUS at 22:30

## 2018-06-21 RX ADMIN — HYDROMORPHONE HYDROCHLORIDE 0.5 MILLIGRAM(S): 2 INJECTION INTRAMUSCULAR; INTRAVENOUS; SUBCUTANEOUS at 22:01

## 2018-06-21 RX ADMIN — HYDROMORPHONE HYDROCHLORIDE 0.5 MILLIGRAM(S): 2 INJECTION INTRAMUSCULAR; INTRAVENOUS; SUBCUTANEOUS at 10:28

## 2018-06-21 RX ADMIN — HYDROMORPHONE HYDROCHLORIDE 2 MILLIGRAM(S): 2 INJECTION INTRAMUSCULAR; INTRAVENOUS; SUBCUTANEOUS at 14:45

## 2018-06-21 RX ADMIN — BUDESONIDE AND FORMOTEROL FUMARATE DIHYDRATE 2 PUFF(S): 160; 4.5 AEROSOL RESPIRATORY (INHALATION) at 17:50

## 2018-06-21 RX ADMIN — HYDROMORPHONE HYDROCHLORIDE 2 MILLIGRAM(S): 2 INJECTION INTRAMUSCULAR; INTRAVENOUS; SUBCUTANEOUS at 06:27

## 2018-06-21 RX ADMIN — HYDROMORPHONE HYDROCHLORIDE 0.5 MILLIGRAM(S): 2 INJECTION INTRAMUSCULAR; INTRAVENOUS; SUBCUTANEOUS at 09:58

## 2018-06-21 RX ADMIN — BUDESONIDE AND FORMOTEROL FUMARATE DIHYDRATE 2 PUFF(S): 160; 4.5 AEROSOL RESPIRATORY (INHALATION) at 06:52

## 2018-06-21 RX ADMIN — HYDROMORPHONE HYDROCHLORIDE 2 MILLIGRAM(S): 2 INJECTION INTRAMUSCULAR; INTRAVENOUS; SUBCUTANEOUS at 06:57

## 2018-06-21 NOTE — CHART NOTE - NSCHARTNOTEFT_GEN_A_CORE
Malnutrition Follow Up     Pt admitted c acute appendicitis (completed antibiotics), abdominal wound and multiple luann. leg wounds, noted wound VAC continues to be in place, noted Dermatology following.     Source: Patient [x]- noted Pt forgetful at times  other [x]- private aide at bedside     Diet : regular c Nepro x 2 and Howard x 2 packets daily       Patient and private aide report [x] other: Pt is eating well, usually consuming at least 50% of her meals and at times a little more. States she has been taking Nepro x 1 daily and Howard x 2 packets daily. Aide reports Pt has BMs every other day, had one earlier today without having to take any Miralax. Pt declined any food preferences at this time, provided Alternative Cold Item list to encourage continuing to choose nutrient/protein dense foods.     Current Weight: admit: 220.6 pounds, no new wt available at this time       Pertinent Medications: MEDICATIONS  (STANDING):  ascorbic acid 500 milliGRAM(s) Oral daily  aspirin enteric coated 81 milliGRAM(s) Oral daily  buDESOnide 160 MICROgram(s)/formoterol 4.5 MICROgram(s) Inhaler 2 Puff(s) Inhalation two times a day  calcium carbonate 1250 mG + Vitamin D (OsCal 500 + D) 1 Tablet(s) Oral three times a day  folic acid 1 milliGRAM(s) Oral daily  gabapentin 200 milliGRAM(s) Oral three times a day  melatonin 3 milliGRAM(s) Oral at bedtime  metoprolol tartrate 12.5 milliGRAM(s) Oral two times a day  minocycline 100 milliGRAM(s) Oral two times a day  multivitamin 1 Tablet(s) Oral daily  ondansetron Injectable 4 milliGRAM(s) IV Push once  pantoprazole    Tablet 40 milliGRAM(s) Oral before breakfast  polyethylene glycol 3350 17 Gram(s) Oral daily  simvastatin 20 milliGRAM(s) Oral at bedtime  tiotropium 18 MICROgram(s) Capsule 1 Capsule(s) Inhalation daily    MEDICATIONS  (PRN):  acetaminophen   Tablet. 650 milliGRAM(s) Oral every 6 hours PRN Mild Pain (1 - 3)  bisacodyl Suppository 10 milliGRAM(s) Rectal daily PRN Constipation  HYDROmorphone   Tablet 2 milliGRAM(s) Oral every 3 hours PRN Moderate Pain (4 - 6)  HYDROmorphone  Injectable 0.5 milliGRAM(s) IV Push every 4 hours PRN Severe Pain (7 - 10)  senna 2 Tablet(s) Oral at bedtime PRN Constipation    Pertinent Labs:  06-21 Na139 mmol/L Glu 96 mg/dL K+ 5.0 mmol/L Cr  0.48 mg/dL<L> BUN 32 mg/dL<H>      Skin: multiple wounds noted, Pt on micronutrient coverage and Howard     Estimated Needs:   [x] no change since previous assessment        Previous Nutrition Diagnosis:      [x] Malnutrition     Nutrition Diagnosis is [x] ongoing- addressed c improving PO intake and supplements         New Nutrition Diagnosis: [x] not applicable        Recommend    [x] Continue c current diet.     [x] Nutrition Supplement: Continue c Nepro and Howard supplements.     [x] Reinforced importance of protein c all meals and snacks and encouraged PO intake.     [x] Provided Alternative Cold Item list.        Monitoring and Evaluation:     [x] PO intake [x] Tolerance to diet prescription [x] weights [x] follow up per protocol    RD remains available.   Heidi Cui, MS, RD, , [facility] #[phone]

## 2018-06-21 NOTE — PROGRESS NOTE ADULT - SUBJECTIVE AND OBJECTIVE BOX
CC: f/u for  appendicitis, infected right tkr  Patient reports she is feeling ok    REVIEW OF SYSTEMS:  All other review of systems negative (Comprehensive ROS)    Antimicrobials Day #  :  minocycline 100 milliGRAM(s) Oral two times a day    Other Medications Reviewed    T(F): 98 (06-21-18 @ 06:25), Max: 98.6 (06-20-18 @ 22:13)  HR: 62 (06-21-18 @ 06:25)  BP: 136/65 (06-21-18 @ 06:25)  RR: 18 (06-21-18 @ 06:25)  SpO2: 96% (06-21-18 @ 06:25)  Wt(kg): --    PHYSICAL EXAM:  General: alert, no acute distress  Eyes:  anicteric, no conjunctival injection, no discharge  Oropharynx: no lesions or injection 	  Neck: supple, without adenopathy  Lungs: clear to auscultation  Heart: regular rate and rhythm; 2/6 sys m  Abdomen: soft, nondistended, nontender, without mass or organomegaly  Skin: no lesions  Extremities: no clubbing, cyanosis, . wounds on right thigh with vac. right knee dressed  Neurologic: alert, oriented, moves all extremities    LAB RESULTS:                        8.6    9.64  )-----------( 261      ( 21 Jun 2018 08:15 )             27.7     06-21    139  |  103  |  32<H>  ----------------------------<  96  5.0   |  26  |  0.48<L>    Ca    9.0      21 Jun 2018 07:14          MICROBIOLOGY:  RECENT CULTURES:  06-18 @ 21:01 .Tissue Other, R buttock skin       Culture - Tissue with Gram Stain (06.13.18 @ 19:06)    -  RIF- Rifampin: S <=1 Should not be used as monotherapy    -  Tetra/Doxy: R >8    -  Trimethoprim/Sulfamethoxazole: S <=0.5/9.5    -  Vancomycin: S 2    -  Clindamycin: R >4    -  Erythromycin: R >4    -  Gentamicin: S <=1 Should not be used as monotherapy    -  Oxacillin: R >2    -  Penicillin: R >8    Gram Stain:   No WBC's seen. per low power field  No organisms seen per oil power field    -  Ampicillin/Sulbactam: R <=8/4    -  Cefazolin: R <=4    Specimen Source: .Tissue Other, Thigh skin    Culture Results:   Growth in fluid media only Coag Negative Staphylococcus    Organism Identification: Coag Negative Staphylococcus    Organism: Coag Negative Staphylococcus    Method Type: EDWARD    Culture - Acid Fast - Tissue w/Smear (06.18.18 @ 21:01)    Specimen Source: .Tissue Other, R buttock skin    Acid Fast Bacilli Smear:   No acid fast bacilli seen by fluorochrome stain    Surgical Pathology Report (06.13.18 @ 16:30)    Surgical Pathology Report:   ACCESSION No:  [id number]    IRASEMA KOHLER                     1        Surgical Final Report          Final Diagnosis  Left thigh, punch biopsy  - Ulcer with a mixed inflammatory infiltrate and prominent  fat necrosis (see note).    Note: Nodefinitive fungal organisms are detected on PAS/D and  GMS stains. The findings in this sample are not  specific. However, pyoderma gangrenosum cannot be entirely  excluded. Clinical correlation is required.    Verified by: Romain José M.D., Dermatopathologist  (Electronic Signature)  Reported on: 06/15/18 15:41 EDT,[address], [address], [place] [address]  _________________________________________________________________    Clinical History  77 yr old female with slowly healing ulcers on abdomen, thighs  and buttocks; r/o pyoderma gangrenosum; previous biopsy on an  ulcer performed on 5/23/18    Specimen(s) Submitted  1     Left thigh    Gross Description  1. The specimen is received in formalin and the specimen  container is labeled: left thigh. It consists of a 0.5 x 0.3 x  0.1 cm shave biopsy of tan skin, with two smaller pale tan soft  fragments of tissue (each measuring 0.3 x 0.2 x 0.2 cm). The base  of the skin specimen is inked black. The skin shave is bisected.  Entirely submitted in one cassette (bisected skin shave and two  smaller fragments of soft tissue).    In addition to other data that may appear on the specimen  container, the label has been inspected to confirm the presence  of the patient's name and date of birth.  ls/06/13/18 18:24  Surgical Pathology Report:   ACCESSION No:  [id number]    IRASEMA KOHLER                     1        Surgical Final Report          Final Diagnosis  Left thigh, punch biopsy  - Ulcer with a mixed inflammatory infiltrate and prominent  fat necrosis (see note).    Note: Nodefinitive fungal organisms are detected on PAS/D and  GMS stains. The findings in this sample are not  specific. However, pyoderma gangrenosum cannot be entirely  excluded. Clinical correlation is required.    Verified by: Romain José M.D., Dermatopathologist  (Electronic Signature)  Reported on: 06/15/18 15:41 EDT,[address], [address], [place] [address]  _________________________________________________________________    Clinical History  77 yr old female with slowly healing ulcers on abdomen, thighs  and buttocks; r/o pyoderma gangrenosum; previous biopsy on an  ulcer performed on 5/23/18    Specimen(s) Submitted  1     Left thigh    Gross Description  1. The specimen is received in formalin and the specimen  container is labeled: left thigh. It consists of a 0.5 x 0.3 x  0.1 cm shave biopsy of tan skin, with two smaller pale tan soft  fragments of tissue (each measuring 0.3 x 0.2 x 0.2 cm). The base  of the skin specimen is inked black. The skin shave is bisected.  Entirely submitted in one cassette (bisected skin shave and two  smaller fragments of soft tissue).    In addition to other data that may appear on the specimen  container, the label has been inspected to confirm the presence  of the patient's name and date of birth.  ls/06/13/18 18:24 (06.13.18 @ 16:30)            Assessment:  Patient with infected right tkr s/p rudy and spacer twice with poor wound healing on suppressive minocycline admitted with appendicitis treated medically. No evidence of perforation or abscess clinically and she remains stable off antibiotics for the past 3 days after a 10 day course of antibiotics for it.  Patient with multiple areas of fat necrosis s/p debridements and vac. Infection not inciting cause. Biopsy did not say pyoderma gangrenosa  REC:  continue minocycline and monitor off additional antibiotics  continue local wound care per plastics and wound team

## 2018-06-21 NOTE — PROGRESS NOTE ADULT - SUBJECTIVE AND OBJECTIVE BOX
Patient is a 77y old  Female who presents with a chief complaint of fever, lethargy (07 Jun 2018 22:19)      INTERVAL HPI/OVERNIGHT EVENTS: pt comfortable, no new complaints      Vital Signs Last 24 Hrs  T(C): 36.7 (21 Jun 2018 06:25), Max: 37 (20 Jun 2018 22:13)  T(F): 98 (21 Jun 2018 06:25), Max: 98.6 (20 Jun 2018 22:13)  HR: 62 (21 Jun 2018 06:25) (61 - 65)  BP: 136/65 (21 Jun 2018 06:25) (102/65 - 136/65)  BP(mean): --  RR: 18 (21 Jun 2018 06:25) (18 - 18)  SpO2: 96% (21 Jun 2018 06:25) (95% - 98%)    acetaminophen   Tablet. 650 milliGRAM(s) Oral every 6 hours PRN  ascorbic acid 500 milliGRAM(s) Oral daily  aspirin enteric coated 81 milliGRAM(s) Oral daily  bisacodyl Suppository 10 milliGRAM(s) Rectal daily PRN  buDESOnide 160 MICROgram(s)/formoterol 4.5 MICROgram(s) Inhaler 2 Puff(s) Inhalation two times a day  calcium carbonate 1250 mG + Vitamin D (OsCal 500 + D) 1 Tablet(s) Oral three times a day  folic acid 1 milliGRAM(s) Oral daily  gabapentin 200 milliGRAM(s) Oral three times a day  HYDROmorphone   Tablet 2 milliGRAM(s) Oral every 3 hours PRN  HYDROmorphone  Injectable 0.5 milliGRAM(s) IV Push every 4 hours PRN  melatonin 3 milliGRAM(s) Oral at bedtime  metoprolol tartrate 12.5 milliGRAM(s) Oral two times a day  minocycline 100 milliGRAM(s) Oral two times a day  multivitamin 1 Tablet(s) Oral daily  ondansetron Injectable 4 milliGRAM(s) IV Push once  pantoprazole    Tablet 40 milliGRAM(s) Oral before breakfast  polyethylene glycol 3350 17 Gram(s) Oral daily  senna 2 Tablet(s) Oral at bedtime PRN  simvastatin 20 milliGRAM(s) Oral at bedtime  tiotropium 18 MICROgram(s) Capsule 1 Capsule(s) Inhalation daily      PHYSICAL EXAM:  GENERAL: NAD,   EYES: conjunctiva and sclera clear  ENMT: Moist mucous membranes  NECK: Supple, No JVD, Normal thyroid  NERVOUS SYSTEM:  Alert & Oriented X3,   CHEST/LUNG: Clear to auscultation bilaterally; No rales, rhonchi, wheezing, or rubs  HEART: Regular rate and rhythm; No murmurs, rubs, or gallops  ABDOMEN: Soft, Nontender, Nondistended; Bowel sounds present  EXTREMITIES:rt knee dressing, lower abd, ant and post rt thigh wound VACS, lt lateral and medial thigh wound dressing  LYMPH: No lymphadenopathy noted  SKIN: No rashes or lesions    Consultant(s) Notes Reviewed:  [x ] YES  [ ] NO  Care Discussed with Consultants/Other Providers [ x] YES  [ ] NO    LABS:                        8.6    9.64  )-----------( 261      ( 21 Jun 2018 08:15 )             27.7     06-21    139  |  103  |  32<H>  ----------------------------<  96  5.0   |  26  |  0.48<L>    Ca    9.0      21 Jun 2018 07:14      PT/INR - ( 21 Jun 2018 08:13 )   PT: 28.2 sec;   INR: 2.45 ratio             CAPILLARY BLOOD GLUCOSE                RADIOLOGY & ADDITIONAL TESTS:    Imaging Personally Reviewed:  [ ] YES  [ ] NO

## 2018-06-22 ENCOUNTER — RESULT REVIEW (OUTPATIENT)
Age: 78
End: 2018-06-22

## 2018-06-22 LAB
ANION GAP SERPL CALC-SCNC: 10 MMOL/L — SIGNIFICANT CHANGE UP (ref 5–17)
APTT BLD: 43.7 SEC — HIGH (ref 27.5–37.4)
BUN SERPL-MCNC: 34 MG/DL — HIGH (ref 7–23)
CALCIUM SERPL-MCNC: 9 MG/DL — SIGNIFICANT CHANGE UP (ref 8.4–10.5)
CHLORIDE SERPL-SCNC: 104 MMOL/L — SIGNIFICANT CHANGE UP (ref 96–108)
CO2 SERPL-SCNC: 25 MMOL/L — SIGNIFICANT CHANGE UP (ref 22–31)
CREAT SERPL-MCNC: 0.5 MG/DL — SIGNIFICANT CHANGE UP (ref 0.5–1.3)
GLUCOSE SERPL-MCNC: 99 MG/DL — SIGNIFICANT CHANGE UP (ref 70–99)
HCT VFR BLD CALC: 28.4 % — LOW (ref 34.5–45)
HGB BLD-MCNC: 8.6 G/DL — LOW (ref 11.5–15.5)
INR BLD: 2.37 RATIO — HIGH (ref 0.88–1.16)
MCHC RBC-ENTMCNC: 26.7 PG — LOW (ref 27–34)
MCHC RBC-ENTMCNC: 30.3 GM/DL — LOW (ref 32–36)
MCV RBC AUTO: 88.2 FL — SIGNIFICANT CHANGE UP (ref 80–100)
PLATELET # BLD AUTO: 278 K/UL — SIGNIFICANT CHANGE UP (ref 150–400)
POTASSIUM SERPL-MCNC: 5.1 MMOL/L — SIGNIFICANT CHANGE UP (ref 3.5–5.3)
POTASSIUM SERPL-SCNC: 5.1 MMOL/L — SIGNIFICANT CHANGE UP (ref 3.5–5.3)
PROTHROM AB SERPL-ACNC: 27.3 SEC — HIGH (ref 10–13.1)
RBC # BLD: 3.22 M/UL — LOW (ref 3.8–5.2)
RBC # FLD: 19.3 % — HIGH (ref 10.3–14.5)
SODIUM SERPL-SCNC: 139 MMOL/L — SIGNIFICANT CHANGE UP (ref 135–145)
WBC # BLD: 9.82 K/UL — SIGNIFICANT CHANGE UP (ref 3.8–10.5)
WBC # FLD AUTO: 9.82 K/UL — SIGNIFICANT CHANGE UP (ref 3.8–10.5)

## 2018-06-22 PROCEDURE — 99223 1ST HOSP IP/OBS HIGH 75: CPT | Mod: 25,GC

## 2018-06-22 PROCEDURE — 11100 BX SKIN SUBCUTANEOUS&/MUCOUS MEMBRANE 1 LESION: CPT | Mod: 59,GC

## 2018-06-22 PROCEDURE — 11101 BIOPSY SKIN SUBQ&/MUCOUS MEMBRANE EA ADDL LESN: CPT

## 2018-06-22 PROCEDURE — 97606 NEG PRS WND THER DME>50 SQCM: CPT

## 2018-06-22 RX ORDER — WARFARIN SODIUM 2.5 MG/1
1.5 TABLET ORAL ONCE
Qty: 0 | Refills: 0 | Status: COMPLETED | OUTPATIENT
Start: 2018-06-22 | End: 2018-06-22

## 2018-06-22 RX ORDER — DIPHENHYDRAMINE HCL 50 MG
12.5 CAPSULE ORAL ONCE
Qty: 0 | Refills: 0 | Status: COMPLETED | OUTPATIENT
Start: 2018-06-22 | End: 2018-06-22

## 2018-06-22 RX ADMIN — MINOCYCLINE HYDROCHLORIDE 100 MILLIGRAM(S): 45 TABLET, EXTENDED RELEASE ORAL at 06:46

## 2018-06-22 RX ADMIN — HYDROMORPHONE HYDROCHLORIDE 0.5 MILLIGRAM(S): 2 INJECTION INTRAMUSCULAR; INTRAVENOUS; SUBCUTANEOUS at 07:00

## 2018-06-22 RX ADMIN — BUDESONIDE AND FORMOTEROL FUMARATE DIHYDRATE 2 PUFF(S): 160; 4.5 AEROSOL RESPIRATORY (INHALATION) at 18:05

## 2018-06-22 RX ADMIN — PANTOPRAZOLE SODIUM 40 MILLIGRAM(S): 20 TABLET, DELAYED RELEASE ORAL at 06:47

## 2018-06-22 RX ADMIN — HYDROMORPHONE HYDROCHLORIDE 0.5 MILLIGRAM(S): 2 INJECTION INTRAMUSCULAR; INTRAVENOUS; SUBCUTANEOUS at 06:45

## 2018-06-22 RX ADMIN — Medication 1 TABLET(S): at 13:22

## 2018-06-22 RX ADMIN — Medication 12.5 MILLIGRAM(S): at 18:05

## 2018-06-22 RX ADMIN — MINOCYCLINE HYDROCHLORIDE 100 MILLIGRAM(S): 45 TABLET, EXTENDED RELEASE ORAL at 18:05

## 2018-06-22 RX ADMIN — BUDESONIDE AND FORMOTEROL FUMARATE DIHYDRATE 2 PUFF(S): 160; 4.5 AEROSOL RESPIRATORY (INHALATION) at 06:46

## 2018-06-22 RX ADMIN — Medication 500 MILLIGRAM(S): at 13:20

## 2018-06-22 RX ADMIN — SIMVASTATIN 20 MILLIGRAM(S): 20 TABLET, FILM COATED ORAL at 21:05

## 2018-06-22 RX ADMIN — Medication 3 MILLIGRAM(S): at 22:47

## 2018-06-22 RX ADMIN — GABAPENTIN 200 MILLIGRAM(S): 400 CAPSULE ORAL at 21:05

## 2018-06-22 RX ADMIN — GABAPENTIN 200 MILLIGRAM(S): 400 CAPSULE ORAL at 06:47

## 2018-06-22 RX ADMIN — HYDROMORPHONE HYDROCHLORIDE 0.5 MILLIGRAM(S): 2 INJECTION INTRAMUSCULAR; INTRAVENOUS; SUBCUTANEOUS at 09:58

## 2018-06-22 RX ADMIN — HYDROMORPHONE HYDROCHLORIDE 0.5 MILLIGRAM(S): 2 INJECTION INTRAMUSCULAR; INTRAVENOUS; SUBCUTANEOUS at 21:36

## 2018-06-22 RX ADMIN — HYDROMORPHONE HYDROCHLORIDE 2 MILLIGRAM(S): 2 INJECTION INTRAMUSCULAR; INTRAVENOUS; SUBCUTANEOUS at 10:04

## 2018-06-22 RX ADMIN — Medication 1 MILLIGRAM(S): at 13:21

## 2018-06-22 RX ADMIN — WARFARIN SODIUM 1.5 MILLIGRAM(S): 2.5 TABLET ORAL at 21:05

## 2018-06-22 RX ADMIN — HYDROMORPHONE HYDROCHLORIDE 0.5 MILLIGRAM(S): 2 INJECTION INTRAMUSCULAR; INTRAVENOUS; SUBCUTANEOUS at 21:06

## 2018-06-22 RX ADMIN — HYDROMORPHONE HYDROCHLORIDE 2 MILLIGRAM(S): 2 INJECTION INTRAMUSCULAR; INTRAVENOUS; SUBCUTANEOUS at 00:43

## 2018-06-22 RX ADMIN — TIOTROPIUM BROMIDE 1 CAPSULE(S): 18 CAPSULE ORAL; RESPIRATORY (INHALATION) at 13:22

## 2018-06-22 RX ADMIN — Medication 1 TABLET(S): at 21:05

## 2018-06-22 RX ADMIN — HYDROMORPHONE HYDROCHLORIDE 2 MILLIGRAM(S): 2 INJECTION INTRAMUSCULAR; INTRAVENOUS; SUBCUTANEOUS at 09:13

## 2018-06-22 RX ADMIN — Medication 12.5 MILLIGRAM(S): at 14:20

## 2018-06-22 RX ADMIN — Medication 1 APPLICATION(S): at 23:05

## 2018-06-22 RX ADMIN — Medication 12.5 MILLIGRAM(S): at 06:47

## 2018-06-22 RX ADMIN — Medication 1 TABLET(S): at 13:20

## 2018-06-22 RX ADMIN — Medication 1 TABLET(S): at 06:52

## 2018-06-22 RX ADMIN — GABAPENTIN 200 MILLIGRAM(S): 400 CAPSULE ORAL at 13:22

## 2018-06-22 RX ADMIN — HYDROMORPHONE HYDROCHLORIDE 2 MILLIGRAM(S): 2 INJECTION INTRAMUSCULAR; INTRAVENOUS; SUBCUTANEOUS at 01:43

## 2018-06-22 RX ADMIN — Medication 81 MILLIGRAM(S): at 13:21

## 2018-06-22 NOTE — PROGRESS NOTE ADULT - SUBJECTIVE AND OBJECTIVE BOX
SUBJECTIVE: Pt seen, chart reviewed.    T/c with  yesterday who requested close attention to wife's care  he was appreciative of phone call  Seen today during vac change  daughter present    Allergies    amoxicillin (Other)    Intolerances    IV Contrast (Flushing (Skin); Nausea)      aspirin enteric coated 81 milliGRAM(s) Oral daily  minocycline 100 milliGRAM(s) Oral two times a day          Physical Exam:  Vital Signs Last 24 Hrs  T(C): 36.6 (22 Jun 2018 08:09), Max: 36.8 (21 Jun 2018 16:23)  T(F): 97.8 (22 Jun 2018 08:09), Max: 98.2 (21 Jun 2018 16:23)  HR: 62 (22 Jun 2018 08:09) (62 - 65)  BP: 109/61 (22 Jun 2018 08:09) (109/61 - 138/75)  BP(mean): --  RR: 18 (22 Jun 2018 08:09) (16 - 18)  SpO2: 95% (22 Jun 2018 08:09) (95% - 97%)    Patient remains at bedrest  progress again d/w pt and daughter  Notes reviewed  Abdominal and 2 lateral right thigh wounds are clean with granulation , but all are larger than when initially treated   Continue VAC  Final Atypical MB cultures pending  All understand that no definitive etiology can be given to these progressive wounds, not in area of prior 2 surgeries         LABS:                        8.6    9.82  )-----------( 278      ( 22 Jun 2018 08:10 )             28.4     PT/INR - ( 22 Jun 2018 08:15 )   PT: 27.3 sec;   INR: 2.37 ratio         PTT - ( 22 Jun 2018 08:15 )  PTT:43.7 sec    remain available SUBJECTIVE: Pt seen, chart reviewed.    T/c with  yesterday who requested close attention to wife's care  he was appreciative of phone call  Seen today during vac change  daughter present    Allergies    amoxicillin (Other)    Intolerances    IV Contrast (Flushing (Skin); Nausea)      aspirin enteric coated 81 milliGRAM(s) Oral daily  minocycline 100 milliGRAM(s) Oral two times a day          Physical Exam:  Vital Signs Last 24 Hrs  T(C): 36.6 (22 Jun 2018 08:09), Max: 36.8 (21 Jun 2018 16:23)  T(F): 97.8 (22 Jun 2018 08:09), Max: 98.2 (21 Jun 2018 16:23)  HR: 62 (22 Jun 2018 08:09) (62 - 65)  BP: 109/61 (22 Jun 2018 08:09) (109/61 - 138/75)  BP(mean): --  RR: 18 (22 Jun 2018 08:09) (16 - 18)  SpO2: 95% (22 Jun 2018 08:09) (95% - 97%)    Patient remains at bedrest  progress again d/w pt and daughter  Notes reviewed  Abdominal and 3 lateral right thigh wounds are clean with granulation , but inferior wound is larger than when initially treated   Continue VAC  Final Atypical MB cultures pending  All understand that no definitive etiology can be given to these progressive wounds, not in area of prior 2 surgeries         LABS:                        8.6    9.82  )-----------( 278      ( 22 Jun 2018 08:10 )             28.4     PT/INR - ( 22 Jun 2018 08:15 )   PT: 27.3 sec;   INR: 2.37 ratio         PTT - ( 22 Jun 2018 08:15 )  PTT:43.7 sec    remain available

## 2018-06-22 NOTE — PROGRESS NOTE ADULT - SUBJECTIVE AND OBJECTIVE BOX
INTERVAL HPI/OVERNIGHT EVENTS:  New itchy rash on b/l volar wrists.  Also new lesions on L thigh adjacent to bandage.     MEDICATIONS  (STANDING):  ascorbic acid 500 milliGRAM(s) Oral daily  aspirin enteric coated 81 milliGRAM(s) Oral daily  buDESOnide 160 MICROgram(s)/formoterol 4.5 MICROgram(s) Inhaler 2 Puff(s) Inhalation two times a day  calcium carbonate 1250 mG + Vitamin D (OsCal 500 + D) 1 Tablet(s) Oral three times a day  folic acid 1 milliGRAM(s) Oral daily  gabapentin 200 milliGRAM(s) Oral three times a day  melatonin 3 milliGRAM(s) Oral at bedtime  metoprolol tartrate 12.5 milliGRAM(s) Oral two times a day  minocycline 100 milliGRAM(s) Oral two times a day  multivitamin 1 Tablet(s) Oral daily  ondansetron Injectable 4 milliGRAM(s) IV Push once  pantoprazole    Tablet 40 milliGRAM(s) Oral before breakfast  polyethylene glycol 3350 17 Gram(s) Oral daily  simvastatin 20 milliGRAM(s) Oral at bedtime  tiotropium 18 MICROgram(s) Capsule 1 Capsule(s) Inhalation daily    MEDICATIONS  (PRN):  acetaminophen   Tablet. 650 milliGRAM(s) Oral every 6 hours PRN Mild Pain (1 - 3)  bisacodyl Suppository 10 milliGRAM(s) Rectal daily PRN Constipation  HYDROmorphone   Tablet 2 milliGRAM(s) Oral every 3 hours PRN Moderate Pain (4 - 6)  HYDROmorphone  Injectable 0.5 milliGRAM(s) IV Push every 4 hours PRN Severe Pain (7 - 10)  senna 2 Tablet(s) Oral at bedtime PRN Constipation      Allergies    amoxicillin (Other)    Intolerances    IV Contrast (Flushing (Skin); Nausea)      REVIEW OF SYSTEMS      General: no fevers/chills, no NS	    Skin: see HPI  	  Ophthalmologic: no eye pain or change in vision    Genitourinary: no dysuria or hematuria    Musculoskeletal: no joint pains or weakness	    Neurological:no weakness or tingling          Vital Signs Last 24 Hrs  T(C): 36.7 (22 Jun 2018 15:40), Max: 36.7 (22 Jun 2018 15:40)  T(F): 98.1 (22 Jun 2018 15:40), Max: 98.1 (22 Jun 2018 15:40)  HR: 65 (22 Jun 2018 15:40) (62 - 65)  BP: 138/81 (22 Jun 2018 15:40) (109/61 - 138/81)  BP(mean): --  RR: 18 (22 Jun 2018 15:40) (16 - 18)  SpO2: 97% (22 Jun 2018 15:40) (95% - 97%)    PHYSICAL EXAM:   The patient was alert and oriented X 3, well nourished, and in no  apparent distress.  There was no visible lymphadenopathy.  Conjunctiva were non injected  There was no clubbing or edema of extremities.  There was no hyperhidrosis or bromhidrosis.    Of note on skin exam:   Annular edematous plaques on b/l volar wrists  Angulated small erosion with surrounding erythema on L inner thigh, erythematous edematous plaque on L thigh      LABS:                        8.6    9.82  )-----------( 278      ( 22 Jun 2018 08:10 )             28.4     06-22    139  |  104  |  34<H>  ----------------------------<  99  5.1   |  25  |  0.50    Ca    9.0      22 Jun 2018 07:16      PT/INR - ( 22 Jun 2018 08:15 )   PT: 27.3 sec;   INR: 2.37 ratio         PTT - ( 22 Jun 2018 08:15 )  PTT:43.7 sec

## 2018-06-22 NOTE — PROGRESS NOTE ADULT - ASSESSMENT
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 with recent admission from (3/9/2018 to 6/6/2018) for TKR c/b joint infection s/p explantation with multiple wounds managed by plastic surgery who presents from rehab today for evaluation of fever, lethargy, and nausea at rehab found to be febrile here with evidence of appendicitis on CT abdomen, non-operative candidate,     # acute appendicitis  Completed Meropenem, off abx >3d   bcx and ucx ngtd, afebrile  ID c/s appreciated      # abd wound, multiple bl leg wounds  continue vac to abdomen, right thigh ant and posterior  vac changes M/W/F  appreciate wound care recs, orders written per wound care  cont NS irrigation, cavilon, medihoney  pain control  appreciate derm recs, unlikely PG,   fu new subq right thigh culture to ro atypical mycobacterium, ro infectious source  tissue culture grew coag neg staph which can be normal roopa    dw derm fellow : awaiting wound cx from rt thigh to r/o atypical mycobacteria-so far neg  pt developed new skin ulcers during this admission- derm suspect ?scratching  we will need to monitor and prevent if it is contributing to her wounds  rec: wrapping proximal thighs b/l, prevent ?scratching at nights      #TKR wound-mrsa-chr mionocycline    # Pain control  dilaudid2 po q4 prn, 0.5 iv prior to dressing change  bowel regimen    # chr Anemia  stable, monitor h/h closely    # S/P TAVR (transcatheter aortic valve replacement)  continue Coumadin, daily INR    # adjustment disorder with depressed mood  appreciate psych re eval  xanax prn    plan of care dw patient

## 2018-06-22 NOTE — PROGRESS NOTE ADULT - SUBJECTIVE AND OBJECTIVE BOX
Plastic Surgery Progress Note (pg [facility]: [id number], [facility]: [phone])    SUBJECTIVE:  The patient was seen and examined. No acute events overnight.    OBJECTIVE:     ** VITAL SIGNS / I&O's **    Vital Signs Last 24 Hrs  T(C): 36.6 (22 Jun 2018 08:09), Max: 36.8 (21 Jun 2018 16:23)  T(F): 97.8 (22 Jun 2018 08:09), Max: 98.2 (21 Jun 2018 16:23)  HR: 62 (22 Jun 2018 08:09) (62 - 65)  BP: 109/61 (22 Jun 2018 08:09) (109/61 - 138/75)  BP(mean): --  RR: 18 (22 Jun 2018 08:09) (16 - 18)  SpO2: 95% (22 Jun 2018 08:09) (95% - 97%)      21 Jun 2018 07:01  -  22 Jun 2018 07:00  --------------------------------------------------------  IN:    Oral Fluid: 540 mL  Total IN: 540 mL    OUT:  Total OUT: 0 mL    Total NET: 540 mL      22 Jun 2018 07:01  -  22 Jun 2018 10:33  --------------------------------------------------------  IN:    Oral Fluid: 200 mL  Total IN: 200 mL    OUT:  Total OUT: 0 mL    Total NET: 200 mL          ** PHYSICAL EXAM **    -- CONSTITUTIONAL: Alert, NAD   -- ABD: VAC intact set to suction  -- Ext: VAC intact holding suction, knee wound with fibrinous material at base, no sign of infection, dressed with wet to dry dressing      ** LABS **                          8.6    9.82  )-----------( 278      ( 22 Jun 2018 08:10 )             28.4     22 Jun 2018 07:16    139    |  104    |  34     ----------------------------<  99     5.1     |  25     |  0.50     Ca    9.0        22 Jun 2018 07:16      PT/INR - ( 22 Jun 2018 08:15 )   PT: 27.3 sec;   INR: 2.37 ratio         PTT - ( 22 Jun 2018 08:15 )  PTT:43.7 sec  CAPILLARY BLOOD GLUCOSE                    ** MEDICATIONS **  MEDICATIONS  (STANDING):  ascorbic acid 500 milliGRAM(s) Oral daily  aspirin enteric coated 81 milliGRAM(s) Oral daily  buDESOnide 160 MICROgram(s)/formoterol 4.5 MICROgram(s) Inhaler 2 Puff(s) Inhalation two times a day  calcium carbonate 1250 mG + Vitamin D (OsCal 500 + D) 1 Tablet(s) Oral three times a day  folic acid 1 milliGRAM(s) Oral daily  gabapentin 200 milliGRAM(s) Oral three times a day  melatonin 3 milliGRAM(s) Oral at bedtime  metoprolol tartrate 12.5 milliGRAM(s) Oral two times a day  minocycline 100 milliGRAM(s) Oral two times a day  multivitamin 1 Tablet(s) Oral daily  ondansetron Injectable 4 milliGRAM(s) IV Push once  pantoprazole    Tablet 40 milliGRAM(s) Oral before breakfast  polyethylene glycol 3350 17 Gram(s) Oral daily  simvastatin 20 milliGRAM(s) Oral at bedtime  tiotropium 18 MICROgram(s) Capsule 1 Capsule(s) Inhalation daily    MEDICATIONS  (PRN):  acetaminophen   Tablet. 650 milliGRAM(s) Oral every 6 hours PRN Mild Pain (1 - 3)  bisacodyl Suppository 10 milliGRAM(s) Rectal daily PRN Constipation  HYDROmorphone   Tablet 2 milliGRAM(s) Oral every 3 hours PRN Moderate Pain (4 - 6)  HYDROmorphone  Injectable 0.5 milliGRAM(s) IV Push every 4 hours PRN Severe Pain (7 - 10)  senna 2 Tablet(s) Oral at bedtime PRN Constipation

## 2018-06-22 NOTE — PROGRESS NOTE ADULT - ASSESSMENT
ASSESSMENT AND PLAN:     1. L volar wrists:  New lesions. Suspects urticaria vs granuloma annulare.  Skin biopsy performed today    - start triamcinolone 0.1% oint BID to AA and wrap it with cling wrap for 1 week    2. Thigh lesions:  New lesions noted on L thigh. Pt's daughter and aid report this is how the other ulcers began. The new spots were noticed this afternoon. Pictures obtained with pt's consent.     Ddx includes excoriation +/- urticaria.    Skin biopsy performed today.      The risks/benefits/alternative of skin biopsy were explained to the patient which include but are not limited to scar, bleeding, infection, and recurrence. the area was prepped with rubbing alcohol, lidocaine was injected for anesthesia and biopsy was performed with a 4 mm punch. The wound was packed with Gel foam. The patient tolerated the procedure well. They were instructed to leave the area covered for 24 hours, and after to apply Vaseline to the area daily.         Gilda Castillo MD  Resident Physician, PGY-3  Department of Dermatology  [facility]  Pager: [phone]  Office: [phone]

## 2018-06-22 NOTE — PROGRESS NOTE ADULT - ASSESSMENT
no plan for orthopedic surgical intervention per family and patient preference  local wound care to knee and vac therapy to abd/thigh per plastics and wound care teams  PO abx per ID team  encourage patient to spend majority of bed oob in bedside chair as able  PT to help mobilize, she must remain 50% wb on the RLE and NO KNEE MOTION allowed, knee immobilizer if oob  coumadin, inr goal 2-3  continue to optimize nutrition  continue to involve psych  appendicitis appears resolved  derm working up leg lesions, not felt to be pyoderma granulosum	    I had a prolonged conversation with the patient, her daughter, and aid today. They are very concerned about discharge to SNF before w/u of these lesions on her b/l LE is complete as we do not know why they are  happening yet. I am in agreement that this should be stabilized with a clear plan from PRS/wound care and dermatology for management and treatment and prevention of these lesions. Orthopedically very stable. Family not in agreement for discharge from hospital until above is resolved. Please hold off on dc plans for now.

## 2018-06-22 NOTE — PROGRESS NOTE ADULT - ASSESSMENT
A/P: s/p Right total knee insertion of spacer, irrigation and debridement, complex wound closure 3/23; readmitted 6/8 for appendicitis  - cont vac change M/W/F  - c/w W2D over knee wound BID  - will cont to follow

## 2018-06-22 NOTE — PROGRESS NOTE ADULT - SUBJECTIVE AND OBJECTIVE BOX
pain controlled  vac changed today    abdominal wound - vac  RLE  thigh - vac and dressing per wound care teams  knee wound with stable fibrinous base, dressed by prs  remainder of wounds dressed by wound care  5/5 ta/ehl/gcs  silt l4-s1  2+ dp pulse

## 2018-06-22 NOTE — PROGRESS NOTE ADULT - SUBJECTIVE AND OBJECTIVE BOX
Patient is a 77y old  Female who presents with a chief complaint of fever, lethargy (07 Jun 2018 22:19)      INTERVAL HPI/OVERNIGHT EVENTS: noted, feels well      Vital Signs Last 24 Hrs  T(C): 36.6 (22 Jun 2018 08:09), Max: 36.8 (21 Jun 2018 16:23)  T(F): 97.8 (22 Jun 2018 08:09), Max: 98.2 (21 Jun 2018 16:23)  HR: 62 (22 Jun 2018 08:09) (62 - 65)  BP: 109/61 (22 Jun 2018 08:09) (109/61 - 138/75)  BP(mean): --  RR: 18 (22 Jun 2018 08:09) (16 - 18)  SpO2: 95% (22 Jun 2018 08:09) (95% - 97%)    acetaminophen   Tablet. 650 milliGRAM(s) Oral every 6 hours PRN  ascorbic acid 500 milliGRAM(s) Oral daily  aspirin enteric coated 81 milliGRAM(s) Oral daily  bisacodyl Suppository 10 milliGRAM(s) Rectal daily PRN  buDESOnide 160 MICROgram(s)/formoterol 4.5 MICROgram(s) Inhaler 2 Puff(s) Inhalation two times a day  calcium carbonate 1250 mG + Vitamin D (OsCal 500 + D) 1 Tablet(s) Oral three times a day  folic acid 1 milliGRAM(s) Oral daily  gabapentin 200 milliGRAM(s) Oral three times a day  HYDROmorphone   Tablet 2 milliGRAM(s) Oral every 3 hours PRN  HYDROmorphone  Injectable 0.5 milliGRAM(s) IV Push every 4 hours PRN  melatonin 3 milliGRAM(s) Oral at bedtime  metoprolol tartrate 12.5 milliGRAM(s) Oral two times a day  minocycline 100 milliGRAM(s) Oral two times a day  multivitamin 1 Tablet(s) Oral daily  ondansetron Injectable 4 milliGRAM(s) IV Push once  pantoprazole    Tablet 40 milliGRAM(s) Oral before breakfast  polyethylene glycol 3350 17 Gram(s) Oral daily  senna 2 Tablet(s) Oral at bedtime PRN  simvastatin 20 milliGRAM(s) Oral at bedtime  tiotropium 18 MICROgram(s) Capsule 1 Capsule(s) Inhalation daily      PHYSICAL EXAM:  GENERAL: NAD,   EYES: conjunctiva and sclera clear  ENMT: Moist mucous membranes  NECK: Supple, No JVD, Normal thyroid  NERVOUS SYSTEM:  Alert & Oriented X3,   CHEST/LUNG: Clear to auscultation bilaterally; No rales, rhonchi, wheezing, or rubs  HEART: Regular rate and rhythm; No murmurs, rubs, or gallops  ABDOMEN: Soft, Nontender, Nondistended; Bowel sounds present  EXTREMITIES:  :rt knee dressing, lower abd, ant and post rt thigh wound VACS, lt lateral and medial thigh wound dressingLYMPH: No lymphadenopathy noted  SKIN: No rashes or lesions    Consultant(s) Notes Reviewed:  [x ] YES  [ ] NO  Care Discussed with Consultants/Other Providers [ x] YES  [ ] NO    LABS:                        8.6    9.82  )-----------( 278      ( 22 Jun 2018 08:10 )             28.4     06-22    139  |  104  |  34<H>  ----------------------------<  99  5.1   |  25  |  0.50    Ca    9.0      22 Jun 2018 07:16      PT/INR - ( 22 Jun 2018 08:15 )   PT: 27.3 sec;   INR: 2.37 ratio         PTT - ( 22 Jun 2018 08:15 )  PTT:43.7 sec    CAPILLARY BLOOD GLUCOSE                RADIOLOGY & ADDITIONAL TESTS:    Imaging Personally Reviewed:  [x ] YES  [ ] NO

## 2018-06-23 LAB
ANION GAP SERPL CALC-SCNC: 12 MMOL/L — SIGNIFICANT CHANGE UP (ref 5–17)
APTT BLD: 43.8 SEC — HIGH (ref 27.5–37.4)
BUN SERPL-MCNC: 39 MG/DL — HIGH (ref 7–23)
CALCIUM SERPL-MCNC: 9 MG/DL — SIGNIFICANT CHANGE UP (ref 8.4–10.5)
CHLORIDE SERPL-SCNC: 103 MMOL/L — SIGNIFICANT CHANGE UP (ref 96–108)
CO2 SERPL-SCNC: 24 MMOL/L — SIGNIFICANT CHANGE UP (ref 22–31)
CREAT SERPL-MCNC: 0.57 MG/DL — SIGNIFICANT CHANGE UP (ref 0.5–1.3)
CULTURE RESULTS: SIGNIFICANT CHANGE UP
GLUCOSE SERPL-MCNC: 96 MG/DL — SIGNIFICANT CHANGE UP (ref 70–99)
HCT VFR BLD CALC: 27.6 % — LOW (ref 34.5–45)
HGB BLD-MCNC: 8.5 G/DL — LOW (ref 11.5–15.5)
INR BLD: 2.5 RATIO — HIGH (ref 0.88–1.16)
MCHC RBC-ENTMCNC: 27.4 PG — SIGNIFICANT CHANGE UP (ref 27–34)
MCHC RBC-ENTMCNC: 30.8 GM/DL — LOW (ref 32–36)
MCV RBC AUTO: 89 FL — SIGNIFICANT CHANGE UP (ref 80–100)
PLATELET # BLD AUTO: 255 K/UL — SIGNIFICANT CHANGE UP (ref 150–400)
POTASSIUM SERPL-MCNC: 4.8 MMOL/L — SIGNIFICANT CHANGE UP (ref 3.5–5.3)
POTASSIUM SERPL-SCNC: 4.8 MMOL/L — SIGNIFICANT CHANGE UP (ref 3.5–5.3)
PROTHROM AB SERPL-ACNC: 28.8 SEC — HIGH (ref 10–13.1)
RBC # BLD: 3.1 M/UL — LOW (ref 3.8–5.2)
RBC # FLD: 19.3 % — HIGH (ref 10.3–14.5)
SODIUM SERPL-SCNC: 139 MMOL/L — SIGNIFICANT CHANGE UP (ref 135–145)
SPECIMEN SOURCE: SIGNIFICANT CHANGE UP
WBC # BLD: 8.71 K/UL — SIGNIFICANT CHANGE UP (ref 3.8–10.5)
WBC # FLD AUTO: 8.71 K/UL — SIGNIFICANT CHANGE UP (ref 3.8–10.5)

## 2018-06-23 RX ORDER — WARFARIN SODIUM 2.5 MG/1
1 TABLET ORAL ONCE
Qty: 0 | Refills: 0 | Status: COMPLETED | OUTPATIENT
Start: 2018-06-23 | End: 2018-06-23

## 2018-06-23 RX ADMIN — HYDROMORPHONE HYDROCHLORIDE 0.5 MILLIGRAM(S): 2 INJECTION INTRAMUSCULAR; INTRAVENOUS; SUBCUTANEOUS at 14:38

## 2018-06-23 RX ADMIN — HYDROMORPHONE HYDROCHLORIDE 0.5 MILLIGRAM(S): 2 INJECTION INTRAMUSCULAR; INTRAVENOUS; SUBCUTANEOUS at 10:59

## 2018-06-23 RX ADMIN — MINOCYCLINE HYDROCHLORIDE 100 MILLIGRAM(S): 45 TABLET, EXTENDED RELEASE ORAL at 05:02

## 2018-06-23 RX ADMIN — HYDROMORPHONE HYDROCHLORIDE 2 MILLIGRAM(S): 2 INJECTION INTRAMUSCULAR; INTRAVENOUS; SUBCUTANEOUS at 13:45

## 2018-06-23 RX ADMIN — Medication 1 TABLET(S): at 21:15

## 2018-06-23 RX ADMIN — Medication 1 APPLICATION(S): at 05:02

## 2018-06-23 RX ADMIN — PANTOPRAZOLE SODIUM 40 MILLIGRAM(S): 20 TABLET, DELAYED RELEASE ORAL at 05:03

## 2018-06-23 RX ADMIN — HYDROMORPHONE HYDROCHLORIDE 0.5 MILLIGRAM(S): 2 INJECTION INTRAMUSCULAR; INTRAVENOUS; SUBCUTANEOUS at 21:45

## 2018-06-23 RX ADMIN — HYDROMORPHONE HYDROCHLORIDE 0.5 MILLIGRAM(S): 2 INJECTION INTRAMUSCULAR; INTRAVENOUS; SUBCUTANEOUS at 14:23

## 2018-06-23 RX ADMIN — WARFARIN SODIUM 1 MILLIGRAM(S): 2.5 TABLET ORAL at 21:15

## 2018-06-23 RX ADMIN — Medication 3 MILLIGRAM(S): at 21:16

## 2018-06-23 RX ADMIN — Medication 1 TABLET(S): at 13:34

## 2018-06-23 RX ADMIN — HYDROMORPHONE HYDROCHLORIDE 2 MILLIGRAM(S): 2 INJECTION INTRAMUSCULAR; INTRAVENOUS; SUBCUTANEOUS at 13:32

## 2018-06-23 RX ADMIN — GABAPENTIN 200 MILLIGRAM(S): 400 CAPSULE ORAL at 05:02

## 2018-06-23 RX ADMIN — Medication 1 MILLIGRAM(S): at 12:04

## 2018-06-23 RX ADMIN — BUDESONIDE AND FORMOTEROL FUMARATE DIHYDRATE 2 PUFF(S): 160; 4.5 AEROSOL RESPIRATORY (INHALATION) at 18:00

## 2018-06-23 RX ADMIN — MINOCYCLINE HYDROCHLORIDE 100 MILLIGRAM(S): 45 TABLET, EXTENDED RELEASE ORAL at 17:57

## 2018-06-23 RX ADMIN — HYDROMORPHONE HYDROCHLORIDE 0.5 MILLIGRAM(S): 2 INJECTION INTRAMUSCULAR; INTRAVENOUS; SUBCUTANEOUS at 10:29

## 2018-06-23 RX ADMIN — Medication 81 MILLIGRAM(S): at 12:04

## 2018-06-23 RX ADMIN — HYDROMORPHONE HYDROCHLORIDE 0.5 MILLIGRAM(S): 2 INJECTION INTRAMUSCULAR; INTRAVENOUS; SUBCUTANEOUS at 21:15

## 2018-06-23 RX ADMIN — GABAPENTIN 200 MILLIGRAM(S): 400 CAPSULE ORAL at 13:34

## 2018-06-23 RX ADMIN — Medication 500 MILLIGRAM(S): at 12:04

## 2018-06-23 RX ADMIN — TIOTROPIUM BROMIDE 1 CAPSULE(S): 18 CAPSULE ORAL; RESPIRATORY (INHALATION) at 12:04

## 2018-06-23 RX ADMIN — Medication 12.5 MILLIGRAM(S): at 05:02

## 2018-06-23 RX ADMIN — Medication 12.5 MILLIGRAM(S): at 17:57

## 2018-06-23 RX ADMIN — SIMVASTATIN 20 MILLIGRAM(S): 20 TABLET, FILM COATED ORAL at 21:15

## 2018-06-23 RX ADMIN — GABAPENTIN 200 MILLIGRAM(S): 400 CAPSULE ORAL at 21:15

## 2018-06-23 RX ADMIN — Medication 1 TABLET(S): at 12:04

## 2018-06-23 RX ADMIN — Medication 1 TABLET(S): at 05:02

## 2018-06-23 RX ADMIN — BUDESONIDE AND FORMOTEROL FUMARATE DIHYDRATE 2 PUFF(S): 160; 4.5 AEROSOL RESPIRATORY (INHALATION) at 05:01

## 2018-06-23 RX ADMIN — Medication 1 APPLICATION(S): at 15:11

## 2018-06-23 NOTE — PROGRESS NOTE ADULT - ASSESSMENT
A/P: s/p Right total knee insertion of spacer, irrigation and debridement, complex wound closure 3/23; readmitted 6/8 for appendicitis  - cont vac change to R thigh and abdomen M/W/F  - start instill VAC therapy to R knee  - remainder of wounds per wound care/derm  - will cont to follow

## 2018-06-23 NOTE — PROGRESS NOTE ADULT - SUBJECTIVE AND OBJECTIVE BOX
Patient is a 77y old  Female who presents with a chief complaint of fever, lethargy (07 Jun 2018 22:19)      INTERVAL HPI/OVERNIGHT EVENTS:      Vital Signs Last 24 Hrs  T(C): 36.9 (23 Jun 2018 00:18), Max: 36.9 (22 Jun 2018 19:30)  T(F): 98.4 (23 Jun 2018 00:18), Max: 98.4 (22 Jun 2018 19:30)  HR: 60 (23 Jun 2018 00:18) (60 - 69)  BP: 111/70 (23 Jun 2018 00:18) (109/61 - 147/83)  BP(mean): --  RR: 16 (23 Jun 2018 00:18) (16 - 18)  SpO2: 94% (23 Jun 2018 00:18) (94% - 97%)    acetaminophen   Tablet. 650 milliGRAM(s) Oral every 6 hours PRN  ascorbic acid 500 milliGRAM(s) Oral daily  aspirin enteric coated 81 milliGRAM(s) Oral daily  bisacodyl Suppository 10 milliGRAM(s) Rectal daily PRN  buDESOnide 160 MICROgram(s)/formoterol 4.5 MICROgram(s) Inhaler 2 Puff(s) Inhalation two times a day  calcium carbonate 1250 mG + Vitamin D (OsCal 500 + D) 1 Tablet(s) Oral three times a day  folic acid 1 milliGRAM(s) Oral daily  gabapentin 200 milliGRAM(s) Oral three times a day  HYDROmorphone   Tablet 2 milliGRAM(s) Oral every 3 hours PRN  HYDROmorphone  Injectable 0.5 milliGRAM(s) IV Push every 4 hours PRN  melatonin 3 milliGRAM(s) Oral at bedtime  metoprolol tartrate 12.5 milliGRAM(s) Oral two times a day  minocycline 100 milliGRAM(s) Oral two times a day  multivitamin 1 Tablet(s) Oral daily  ondansetron Injectable 4 milliGRAM(s) IV Push once  pantoprazole    Tablet 40 milliGRAM(s) Oral before breakfast  polyethylene glycol 3350 17 Gram(s) Oral daily  senna 2 Tablet(s) Oral at bedtime PRN  simvastatin 20 milliGRAM(s) Oral at bedtime  tiotropium 18 MICROgram(s) Capsule 1 Capsule(s) Inhalation daily  triamcinolone 0.1% Ointment 1 Application(s) Topical two times a day      PHYSICAL EXAM:  GENERAL: NAD,   EYES: conjunctiva and sclera clear  ENMT: Moist mucous membranes  NECK: Supple, No JVD, Normal thyroid  NERVOUS SYSTEM:  Alert & Oriented X3,   CHEST/LUNG: Clear to auscultation bilaterally; No rales, rhonchi, wheezing, or rubs  HEART: Regular rate and rhythm; No murmurs, rubs, or gallops  ABDOMEN: Soft, Nontender, Nondistended; Bowel sounds present  EXTREMITIES: new erythema on lt lateral prox thigh  rt knee dressing, lower abd, ant and post rt thigh wound VACS, lt lateral and medial thigh wound dressing  LYMPH: No lymphadenopathy noted  SKIN: No rashes or lesions    Consultant(s) Notes Reviewed:  [x ] YES  [ ] NO  Care Discussed with Consultants/Other Providers [ x] YES  [ ] NO    LABS:                        8.6    9.82  )-----------( 278      ( 22 Jun 2018 08:10 )             28.4     06-23    139  |  103  |  39<H>  ----------------------------<  96  4.8   |  24  |  0.57    Ca    9.0      23 Jun 2018 07:14      PT/INR - ( 22 Jun 2018 08:15 )   PT: 27.3 sec;   INR: 2.37 ratio         PTT - ( 22 Jun 2018 08:15 )  PTT:43.7 sec    CAPILLARY BLOOD GLUCOSE                RADIOLOGY & ADDITIONAL TESTS:    Imaging Personally Reviewed:  [ x] YES  [ ] NO

## 2018-06-23 NOTE — PROGRESS NOTE ADULT - SUBJECTIVE AND OBJECTIVE BOX
Plastic Surgery Progress Note (pg [facility]: [id number], [facility]: [phone])    SUBJECTIVE:  The patient was seen and examined. No acute events overnight. Left thigh ulcer and bilateral wrist rash skin biopsies performed yesterday by dermatology. VAC change performed yesterday to R thigh and abdomen.    OBJECTIVE:     ** VITAL SIGNS / I&O's **    Vital Signs Last 24 Hrs  T(C): 36.9 (23 Jun 2018 08:24), Max: 36.9 (22 Jun 2018 19:30)  T(F): 98.4 (23 Jun 2018 08:24), Max: 98.4 (22 Jun 2018 19:30)  HR: 60 (23 Jun 2018 08:24) (60 - 69)  BP: 124/74 (23 Jun 2018 08:24) (111/70 - 147/83)  BP(mean): --  RR: 18 (23 Jun 2018 08:24) (16 - 18)  SpO2: 96% (23 Jun 2018 08:24) (94% - 97%)      22 Jun 2018 07:01  -  23 Jun 2018 07:00  --------------------------------------------------------  IN:    Oral Fluid: 200 mL  Total IN: 200 mL    OUT:  Total OUT: 0 mL    Total NET: 200 mL          ** PHYSICAL EXAM **    -- CONSTITUTIONAL: Alert, NAD   -- ABD: VAC intact set to suction  -- Ext: VAC intact holding suction, knee wound with fibrinous exudate at base, sharply debrided at bedside, depth of wound base appears close to tibia, dressed with wet to dry dressing      ** LABS **                          8.6    9.82  )-----------( 278      ( 22 Jun 2018 08:10 )             28.4     23 Jun 2018 07:14    139    |  103    |  39     ----------------------------<  96     4.8     |  24     |  0.57     Ca    9.0        23 Jun 2018 07:14      PT/INR - ( 22 Jun 2018 08:15 )   PT: 27.3 sec;   INR: 2.37 ratio         PTT - ( 22 Jun 2018 08:15 )  PTT:43.7 sec  CAPILLARY BLOOD GLUCOSE                    ** MEDICATIONS **  MEDICATIONS  (STANDING):  ascorbic acid 500 milliGRAM(s) Oral daily  aspirin enteric coated 81 milliGRAM(s) Oral daily  buDESOnide 160 MICROgram(s)/formoterol 4.5 MICROgram(s) Inhaler 2 Puff(s) Inhalation two times a day  calcium carbonate 1250 mG + Vitamin D (OsCal 500 + D) 1 Tablet(s) Oral three times a day  folic acid 1 milliGRAM(s) Oral daily  gabapentin 200 milliGRAM(s) Oral three times a day  melatonin 3 milliGRAM(s) Oral at bedtime  metoprolol tartrate 12.5 milliGRAM(s) Oral two times a day  minocycline 100 milliGRAM(s) Oral two times a day  multivitamin 1 Tablet(s) Oral daily  ondansetron Injectable 4 milliGRAM(s) IV Push once  pantoprazole    Tablet 40 milliGRAM(s) Oral before breakfast  polyethylene glycol 3350 17 Gram(s) Oral daily  simvastatin 20 milliGRAM(s) Oral at bedtime  tiotropium 18 MICROgram(s) Capsule 1 Capsule(s) Inhalation daily  triamcinolone 0.1% Ointment 1 Application(s) Topical two times a day    MEDICATIONS  (PRN):  acetaminophen   Tablet. 650 milliGRAM(s) Oral every 6 hours PRN Mild Pain (1 - 3)  bisacodyl Suppository 10 milliGRAM(s) Rectal daily PRN Constipation  HYDROmorphone   Tablet 2 milliGRAM(s) Oral every 3 hours PRN Moderate Pain (4 - 6)  HYDROmorphone  Injectable 0.5 milliGRAM(s) IV Push every 4 hours PRN Severe Pain (7 - 10)  senna 2 Tablet(s) Oral at bedtime PRN Constipation

## 2018-06-23 NOTE — PROGRESS NOTE ADULT - ASSESSMENT
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 [id number], MVR, 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 with recent admission from (3/9/2018 to 6/6/2018) for TKR c/b joint infection s/p explantation with multiple wounds managed by plastic surgery who presents from rehab today for evaluation of fever, lethargy, and nausea at rehab found to be febrile here with evidence of appendicitis on CT abdomen, non-operative candidate,     # acute appendicitis  Completed Meropenem, off abx >3d   bcx and ucx ngtd, afebrile  ID c/s appreciated      # abd wound, multiple bl leg wounds  continue vac to abdomen, right thigh ant and posterior  vac changes M/W/F  appreciate wound care recs, orders written per wound care  cont NS irrigation, cavilon, medihoney  pain control  appreciate derm recs, unlikely PG,   fu new subq right thigh culture to ro atypical mycobacterium, ro infectious source  tissue culture grew coag neg staph which can be normal roopa    dw derm fellow : awaiting wound cx from rt thigh to r/o atypical mycobacteria-so far neg  pt developed new skin ulcers during this admission- derm suspect ?excoriation vs allergy  s/p biopsy of new erythematous site done yesterday-f/u cx and path results    #TKR wound-mrsa-chr mionocycline    # Pain control  dilaudid2 po q4 prn, 0.5 iv prior to dressing change  bowel regimen    # chr Anemia  stable, monitor h/h closely    # S/P TAVR (transcatheter aortic valve replacement)  continue Coumadin, daily INR    # adjustment disorder with depressed mood  appreciate psych re eval  xanax prn    plan of care dw patient

## 2018-06-24 LAB
ANION GAP SERPL CALC-SCNC: 12 MMOL/L — SIGNIFICANT CHANGE UP (ref 5–17)
APTT BLD: 45.4 SEC — HIGH (ref 27.5–37.4)
BUN SERPL-MCNC: 32 MG/DL — HIGH (ref 7–23)
CALCIUM SERPL-MCNC: 9.1 MG/DL — SIGNIFICANT CHANGE UP (ref 8.4–10.5)
CHLORIDE SERPL-SCNC: 102 MMOL/L — SIGNIFICANT CHANGE UP (ref 96–108)
CO2 SERPL-SCNC: 24 MMOL/L — SIGNIFICANT CHANGE UP (ref 22–31)
CREAT SERPL-MCNC: 0.59 MG/DL — SIGNIFICANT CHANGE UP (ref 0.5–1.3)
GLUCOSE SERPL-MCNC: 127 MG/DL — HIGH (ref 70–99)
HCT VFR BLD CALC: 30.2 % — LOW (ref 34.5–45)
HGB BLD-MCNC: 9.2 G/DL — LOW (ref 11.5–15.5)
INR BLD: 2.65 RATIO — HIGH (ref 0.88–1.16)
MCHC RBC-ENTMCNC: 27.1 PG — SIGNIFICANT CHANGE UP (ref 27–34)
MCHC RBC-ENTMCNC: 30.5 GM/DL — LOW (ref 32–36)
MCV RBC AUTO: 88.8 FL — SIGNIFICANT CHANGE UP (ref 80–100)
PLATELET # BLD AUTO: 283 K/UL — SIGNIFICANT CHANGE UP (ref 150–400)
POTASSIUM SERPL-MCNC: 4.6 MMOL/L — SIGNIFICANT CHANGE UP (ref 3.5–5.3)
POTASSIUM SERPL-SCNC: 4.6 MMOL/L — SIGNIFICANT CHANGE UP (ref 3.5–5.3)
PROTHROM AB SERPL-ACNC: 29.5 SEC — HIGH (ref 9.8–12.7)
RBC # BLD: 3.4 M/UL — LOW (ref 3.8–5.2)
RBC # FLD: 19.7 % — HIGH (ref 10.3–14.5)
SODIUM SERPL-SCNC: 138 MMOL/L — SIGNIFICANT CHANGE UP (ref 135–145)
WBC # BLD: 8.71 K/UL — SIGNIFICANT CHANGE UP (ref 3.8–10.5)
WBC # FLD AUTO: 8.71 K/UL — SIGNIFICANT CHANGE UP (ref 3.8–10.5)

## 2018-06-24 PROCEDURE — 99232 SBSQ HOSP IP/OBS MODERATE 35: CPT

## 2018-06-24 RX ORDER — WARFARIN SODIUM 2.5 MG/1
1.5 TABLET ORAL ONCE
Qty: 0 | Refills: 0 | Status: COMPLETED | OUTPATIENT
Start: 2018-06-24 | End: 2018-06-24

## 2018-06-24 RX ORDER — ALPRAZOLAM 0.25 MG
0.25 TABLET ORAL DAILY
Qty: 0 | Refills: 0 | Status: DISCONTINUED | OUTPATIENT
Start: 2018-06-24 | End: 2018-07-01

## 2018-06-24 RX ADMIN — PANTOPRAZOLE SODIUM 40 MILLIGRAM(S): 20 TABLET, DELAYED RELEASE ORAL at 05:53

## 2018-06-24 RX ADMIN — Medication 1 TABLET(S): at 13:05

## 2018-06-24 RX ADMIN — BUDESONIDE AND FORMOTEROL FUMARATE DIHYDRATE 2 PUFF(S): 160; 4.5 AEROSOL RESPIRATORY (INHALATION) at 18:02

## 2018-06-24 RX ADMIN — Medication 81 MILLIGRAM(S): at 13:05

## 2018-06-24 RX ADMIN — GABAPENTIN 200 MILLIGRAM(S): 400 CAPSULE ORAL at 13:05

## 2018-06-24 RX ADMIN — HYDROMORPHONE HYDROCHLORIDE 0.5 MILLIGRAM(S): 2 INJECTION INTRAMUSCULAR; INTRAVENOUS; SUBCUTANEOUS at 21:46

## 2018-06-24 RX ADMIN — Medication 30 MILLILITER(S): at 21:45

## 2018-06-24 RX ADMIN — Medication 650 MILLIGRAM(S): at 18:02

## 2018-06-24 RX ADMIN — HYDROMORPHONE HYDROCHLORIDE 0.5 MILLIGRAM(S): 2 INJECTION INTRAMUSCULAR; INTRAVENOUS; SUBCUTANEOUS at 05:52

## 2018-06-24 RX ADMIN — Medication 1 TABLET(S): at 21:47

## 2018-06-24 RX ADMIN — Medication 12.5 MILLIGRAM(S): at 18:02

## 2018-06-24 RX ADMIN — Medication 30 MILLILITER(S): at 16:15

## 2018-06-24 RX ADMIN — TIOTROPIUM BROMIDE 1 CAPSULE(S): 18 CAPSULE ORAL; RESPIRATORY (INHALATION) at 13:06

## 2018-06-24 RX ADMIN — HYDROMORPHONE HYDROCHLORIDE 0.5 MILLIGRAM(S): 2 INJECTION INTRAMUSCULAR; INTRAVENOUS; SUBCUTANEOUS at 22:20

## 2018-06-24 RX ADMIN — Medication 1 MILLIGRAM(S): at 13:05

## 2018-06-24 RX ADMIN — GABAPENTIN 200 MILLIGRAM(S): 400 CAPSULE ORAL at 21:47

## 2018-06-24 RX ADMIN — HYDROMORPHONE HYDROCHLORIDE 0.5 MILLIGRAM(S): 2 INJECTION INTRAMUSCULAR; INTRAVENOUS; SUBCUTANEOUS at 10:18

## 2018-06-24 RX ADMIN — GABAPENTIN 200 MILLIGRAM(S): 400 CAPSULE ORAL at 05:51

## 2018-06-24 RX ADMIN — BUDESONIDE AND FORMOTEROL FUMARATE DIHYDRATE 2 PUFF(S): 160; 4.5 AEROSOL RESPIRATORY (INHALATION) at 05:51

## 2018-06-24 RX ADMIN — Medication 1 APPLICATION(S): at 05:51

## 2018-06-24 RX ADMIN — HYDROMORPHONE HYDROCHLORIDE 0.5 MILLIGRAM(S): 2 INJECTION INTRAMUSCULAR; INTRAVENOUS; SUBCUTANEOUS at 12:59

## 2018-06-24 RX ADMIN — HYDROMORPHONE HYDROCHLORIDE 0.5 MILLIGRAM(S): 2 INJECTION INTRAMUSCULAR; INTRAVENOUS; SUBCUTANEOUS at 06:22

## 2018-06-24 RX ADMIN — Medication 650 MILLIGRAM(S): at 19:17

## 2018-06-24 RX ADMIN — SIMVASTATIN 20 MILLIGRAM(S): 20 TABLET, FILM COATED ORAL at 21:47

## 2018-06-24 RX ADMIN — HYDROMORPHONE HYDROCHLORIDE 0.5 MILLIGRAM(S): 2 INJECTION INTRAMUSCULAR; INTRAVENOUS; SUBCUTANEOUS at 16:30

## 2018-06-24 RX ADMIN — Medication 3 MILLIGRAM(S): at 21:46

## 2018-06-24 RX ADMIN — Medication 1 TABLET(S): at 05:51

## 2018-06-24 RX ADMIN — Medication 0.25 MILLIGRAM(S): at 16:15

## 2018-06-24 RX ADMIN — MINOCYCLINE HYDROCHLORIDE 100 MILLIGRAM(S): 45 TABLET, EXTENDED RELEASE ORAL at 05:51

## 2018-06-24 RX ADMIN — WARFARIN SODIUM 1.5 MILLIGRAM(S): 2.5 TABLET ORAL at 21:47

## 2018-06-24 RX ADMIN — MINOCYCLINE HYDROCHLORIDE 100 MILLIGRAM(S): 45 TABLET, EXTENDED RELEASE ORAL at 18:02

## 2018-06-24 RX ADMIN — Medication 12.5 MILLIGRAM(S): at 05:51

## 2018-06-24 RX ADMIN — Medication 500 MILLIGRAM(S): at 13:06

## 2018-06-24 RX ADMIN — HYDROMORPHONE HYDROCHLORIDE 0.5 MILLIGRAM(S): 2 INJECTION INTRAMUSCULAR; INTRAVENOUS; SUBCUTANEOUS at 16:45

## 2018-06-24 NOTE — PROGRESS NOTE ADULT - ASSESSMENT
no plan for orthopedic surgical intervention per family and patient preference  PRS now recs vac to R knee in addition to vac therapy to abd/thigh per plastics and wound care teams  PO abx per ID team, minocycline  encourage patient to spend majority of bed oob in bedside chair as able, has not been oob recently  PT to help mobilize, she must remain 50% wb on the RLE and NO KNEE MOTION allowed, knee immobilizer if oob  coumadin, inr goal 2-3  continue to optimize nutrition  continue to involve psych  derm working up leg lesions, not felt to be pyoderma granulosum but unclear source  while orthopedic issues are stable, she is developing new dermatologic lesions daily and thus I do not recommend dc to SNF until these are controlled or further worked up to prevent future lesions

## 2018-06-24 NOTE — PROGRESS NOTE ADULT - SUBJECTIVE AND OBJECTIVE BOX
Patient is a 77y old  Female who presents with a chief complaint of fever, lethargy (07 Jun 2018 22:19)      INTERVAL HPI/OVERNIGHT EVENTS: noted, feels well      Vital Signs Last 24 Hrs  T(C): 37.1 (24 Jun 2018 06:42), Max: 37.1 (24 Jun 2018 06:42)  T(F): 98.7 (24 Jun 2018 06:42), Max: 98.7 (24 Jun 2018 06:42)  HR: 73 (24 Jun 2018 06:42) (64 - 73)  BP: 118/73 (24 Jun 2018 06:42) (118/73 - 129/77)  BP(mean): --  RR: 17 (24 Jun 2018 06:42) (17 - 18)  SpO2: 99% (24 Jun 2018 06:42) (96% - 99%)    acetaminophen   Tablet. 650 milliGRAM(s) Oral every 6 hours PRN  ascorbic acid 500 milliGRAM(s) Oral daily  aspirin enteric coated 81 milliGRAM(s) Oral daily  bisacodyl Suppository 10 milliGRAM(s) Rectal daily PRN  buDESOnide 160 MICROgram(s)/formoterol 4.5 MICROgram(s) Inhaler 2 Puff(s) Inhalation two times a day  calcium carbonate 1250 mG + Vitamin D (OsCal 500 + D) 1 Tablet(s) Oral three times a day  folic acid 1 milliGRAM(s) Oral daily  gabapentin 200 milliGRAM(s) Oral three times a day  HYDROmorphone   Tablet 2 milliGRAM(s) Oral every 3 hours PRN  HYDROmorphone  Injectable 0.5 milliGRAM(s) IV Push every 4 hours PRN  melatonin 3 milliGRAM(s) Oral at bedtime  metoprolol tartrate 12.5 milliGRAM(s) Oral two times a day  minocycline 100 milliGRAM(s) Oral two times a day  multivitamin 1 Tablet(s) Oral daily  ondansetron Injectable 4 milliGRAM(s) IV Push once  pantoprazole    Tablet 40 milliGRAM(s) Oral before breakfast  polyethylene glycol 3350 17 Gram(s) Oral daily  senna 2 Tablet(s) Oral at bedtime PRN  simvastatin 20 milliGRAM(s) Oral at bedtime  tiotropium 18 MICROgram(s) Capsule 1 Capsule(s) Inhalation daily  triamcinolone 0.1% Ointment 1 Application(s) Topical two times a day      PHYSICAL EXAM:  GENERAL: NAD,   EYES: conjunctiva and sclera clear  ENMT: Moist mucous membranes  NECK: Supple, No JVD, Normal thyroid  NERVOUS SYSTEM:  Alert & Oriented X3,   CHEST/LUNG: Clear to auscultation bilaterally; No rales, rhonchi, wheezing, or rubs  HEART: Regular rate and rhythm; No murmurs, rubs, or gallops  ABDOMEN: Soft, Nontender, Nondistended; Bowel sounds present  EXTREMITIES:  new erythema on lt lateral prox thigh, ltforearm  rt knee dressing, lower abd, ant and post rt thigh wound VACS, lt lateral and medial thigh wound dressing  LYMPH: No lymphadenopathy noted  SKIN: No rashes or lesions    Consultant(s) Notes Reviewed:  [x ] YES  [ ] NO  Care Discussed with Consultants/Other Providers [ x] YES  [ ] NO    LABS:                        8.5    8.71  )-----------( 255      ( 23 Jun 2018 08:47 )             27.6     06-24    138  |  102  |  32<H>  ----------------------------<  127<H>  4.6   |  24  |  0.59    Ca    9.1      24 Jun 2018 07:41      PT/INR - ( 23 Jun 2018 08:49 )   PT: 28.8 sec;   INR: 2.50 ratio         PTT - ( 23 Jun 2018 08:49 )  PTT:43.8 sec    CAPILLARY BLOOD GLUCOSE                RADIOLOGY & ADDITIONAL TESTS:    Imaging Personally Reviewed:  [ ] YES  [ ] NO

## 2018-06-24 NOTE — PROGRESS NOTE ADULT - SUBJECTIVE AND OBJECTIVE BOX
pain controlled, mild pain rle though  lying in bed    abdominal wound - vac  RLE  thigh - vac and dressing per wound care teams  knee wound with stable fibrinous base, dressed by prs  remainder of wounds dressed by wound care  5/5 ta/ehl/gcs  silt l4-s1  2+ dp pulse    ROS: depressed

## 2018-06-25 ENCOUNTER — TRANSCRIPTION ENCOUNTER (OUTPATIENT)
Age: 78
End: 2018-06-25

## 2018-06-25 LAB
INR BLD: 2.81 RATIO — HIGH (ref 0.88–1.16)
PROTHROM AB SERPL-ACNC: 31.3 SEC — HIGH (ref 9.8–12.7)

## 2018-06-25 RX ORDER — WARFARIN SODIUM 2.5 MG/1
0.5 TABLET ORAL ONCE
Qty: 0 | Refills: 0 | Status: COMPLETED | OUTPATIENT
Start: 2018-06-25 | End: 2018-06-25

## 2018-06-25 RX ORDER — HYDROMORPHONE HYDROCHLORIDE 2 MG/ML
0.5 INJECTION INTRAMUSCULAR; INTRAVENOUS; SUBCUTANEOUS EVERY 4 HOURS
Qty: 0 | Refills: 0 | Status: DISCONTINUED | OUTPATIENT
Start: 2018-06-25 | End: 2018-07-03

## 2018-06-25 RX ORDER — HYDROMORPHONE HYDROCHLORIDE 2 MG/ML
2 INJECTION INTRAMUSCULAR; INTRAVENOUS; SUBCUTANEOUS
Qty: 0 | Refills: 0 | Status: DISCONTINUED | OUTPATIENT
Start: 2018-06-25 | End: 2018-07-02

## 2018-06-25 RX ORDER — WARFARIN SODIUM 2.5 MG/1
1 TABLET ORAL ONCE
Qty: 0 | Refills: 0 | Status: DISCONTINUED | OUTPATIENT
Start: 2018-06-25 | End: 2018-06-25

## 2018-06-25 RX ADMIN — BUDESONIDE AND FORMOTEROL FUMARATE DIHYDRATE 2 PUFF(S): 160; 4.5 AEROSOL RESPIRATORY (INHALATION) at 17:43

## 2018-06-25 RX ADMIN — PANTOPRAZOLE SODIUM 40 MILLIGRAM(S): 20 TABLET, DELAYED RELEASE ORAL at 06:11

## 2018-06-25 RX ADMIN — GABAPENTIN 200 MILLIGRAM(S): 400 CAPSULE ORAL at 14:20

## 2018-06-25 RX ADMIN — MINOCYCLINE HYDROCHLORIDE 100 MILLIGRAM(S): 45 TABLET, EXTENDED RELEASE ORAL at 17:42

## 2018-06-25 RX ADMIN — HYDROMORPHONE HYDROCHLORIDE 0.5 MILLIGRAM(S): 2 INJECTION INTRAMUSCULAR; INTRAVENOUS; SUBCUTANEOUS at 06:08

## 2018-06-25 RX ADMIN — Medication 12.5 MILLIGRAM(S): at 06:10

## 2018-06-25 RX ADMIN — Medication 12.5 MILLIGRAM(S): at 17:42

## 2018-06-25 RX ADMIN — WARFARIN SODIUM 0.5 MILLIGRAM(S): 2.5 TABLET ORAL at 22:16

## 2018-06-25 RX ADMIN — Medication 0.25 MILLIGRAM(S): at 23:43

## 2018-06-25 RX ADMIN — SIMVASTATIN 20 MILLIGRAM(S): 20 TABLET, FILM COATED ORAL at 22:17

## 2018-06-25 RX ADMIN — Medication 1 TABLET(S): at 14:20

## 2018-06-25 RX ADMIN — HYDROMORPHONE HYDROCHLORIDE 0.5 MILLIGRAM(S): 2 INJECTION INTRAMUSCULAR; INTRAVENOUS; SUBCUTANEOUS at 06:44

## 2018-06-25 RX ADMIN — POLYETHYLENE GLYCOL 3350 17 GRAM(S): 17 POWDER, FOR SOLUTION ORAL at 20:13

## 2018-06-25 RX ADMIN — GABAPENTIN 200 MILLIGRAM(S): 400 CAPSULE ORAL at 22:17

## 2018-06-25 RX ADMIN — Medication 1 TABLET(S): at 14:19

## 2018-06-25 RX ADMIN — Medication 1 MILLIGRAM(S): at 14:20

## 2018-06-25 RX ADMIN — SENNA PLUS 2 TABLET(S): 8.6 TABLET ORAL at 20:11

## 2018-06-25 RX ADMIN — HYDROMORPHONE HYDROCHLORIDE 2 MILLIGRAM(S): 2 INJECTION INTRAMUSCULAR; INTRAVENOUS; SUBCUTANEOUS at 10:25

## 2018-06-25 RX ADMIN — Medication 3 MILLIGRAM(S): at 22:17

## 2018-06-25 RX ADMIN — Medication 1 APPLICATION(S): at 06:20

## 2018-06-25 RX ADMIN — GABAPENTIN 200 MILLIGRAM(S): 400 CAPSULE ORAL at 06:10

## 2018-06-25 RX ADMIN — HYDROMORPHONE HYDROCHLORIDE 0.5 MILLIGRAM(S): 2 INJECTION INTRAMUSCULAR; INTRAVENOUS; SUBCUTANEOUS at 12:06

## 2018-06-25 RX ADMIN — MINOCYCLINE HYDROCHLORIDE 100 MILLIGRAM(S): 45 TABLET, EXTENDED RELEASE ORAL at 06:11

## 2018-06-25 RX ADMIN — Medication 1 TABLET(S): at 06:11

## 2018-06-25 RX ADMIN — Medication 500 MILLIGRAM(S): at 14:20

## 2018-06-25 RX ADMIN — HYDROMORPHONE HYDROCHLORIDE 0.5 MILLIGRAM(S): 2 INJECTION INTRAMUSCULAR; INTRAVENOUS; SUBCUTANEOUS at 22:15

## 2018-06-25 RX ADMIN — Medication 1 TABLET(S): at 22:17

## 2018-06-25 RX ADMIN — Medication 650 MILLIGRAM(S): at 11:59

## 2018-06-25 RX ADMIN — HYDROMORPHONE HYDROCHLORIDE 0.5 MILLIGRAM(S): 2 INJECTION INTRAMUSCULAR; INTRAVENOUS; SUBCUTANEOUS at 14:26

## 2018-06-25 RX ADMIN — HYDROMORPHONE HYDROCHLORIDE 2 MILLIGRAM(S): 2 INJECTION INTRAMUSCULAR; INTRAVENOUS; SUBCUTANEOUS at 12:06

## 2018-06-25 RX ADMIN — Medication 81 MILLIGRAM(S): at 14:19

## 2018-06-25 RX ADMIN — BUDESONIDE AND FORMOTEROL FUMARATE DIHYDRATE 2 PUFF(S): 160; 4.5 AEROSOL RESPIRATORY (INHALATION) at 06:11

## 2018-06-25 RX ADMIN — HYDROMORPHONE HYDROCHLORIDE 0.5 MILLIGRAM(S): 2 INJECTION INTRAMUSCULAR; INTRAVENOUS; SUBCUTANEOUS at 15:20

## 2018-06-25 RX ADMIN — Medication 650 MILLIGRAM(S): at 13:58

## 2018-06-25 RX ADMIN — Medication 30 MILLILITER(S): at 19:04

## 2018-06-25 RX ADMIN — TIOTROPIUM BROMIDE 1 CAPSULE(S): 18 CAPSULE ORAL; RESPIRATORY (INHALATION) at 14:30

## 2018-06-25 RX ADMIN — HYDROMORPHONE HYDROCHLORIDE 0.5 MILLIGRAM(S): 2 INJECTION INTRAMUSCULAR; INTRAVENOUS; SUBCUTANEOUS at 23:00

## 2018-06-25 RX ADMIN — HYDROMORPHONE HYDROCHLORIDE 0.5 MILLIGRAM(S): 2 INJECTION INTRAMUSCULAR; INTRAVENOUS; SUBCUTANEOUS at 10:25

## 2018-06-25 NOTE — PROGRESS NOTE ADULT - SUBJECTIVE AND OBJECTIVE BOX
Patient is a 77y old  Female who presents with a chief complaint of fever, lethargy (07 Jun 2018 22:19)      INTERVAL HPI/OVERNIGHT EVENTS: noted, wound vacs being changed at bedside  dgtr at bedside      Vital Signs Last 24 Hrs  T(C): 36.4 (25 Jun 2018 07:38), Max: 36.8 (24 Jun 2018 16:38)  T(F): 97.5 (25 Jun 2018 07:38), Max: 98.3 (24 Jun 2018 16:38)  HR: 60 (25 Jun 2018 07:38) (56 - 69)  BP: 111/72 (25 Jun 2018 07:38) (106/71 - 140/81)  BP(mean): --  RR: 18 (25 Jun 2018 07:38) (18 - 18)  SpO2: 93% (25 Jun 2018 07:38) (93% - 98%)    acetaminophen   Tablet. 650 milliGRAM(s) Oral every 6 hours PRN  ALPRAZolam 0.25 milliGRAM(s) Oral daily PRN  ascorbic acid 500 milliGRAM(s) Oral daily  aspirin enteric coated 81 milliGRAM(s) Oral daily  bisacodyl Suppository 10 milliGRAM(s) Rectal daily PRN  bismuth subsalicylate Liquid 30 milliLiter(s) Oral every 6 hours PRN  buDESOnide 160 MICROgram(s)/formoterol 4.5 MICROgram(s) Inhaler 2 Puff(s) Inhalation two times a day  calcium carbonate 1250 mG + Vitamin D (OsCal 500 + D) 1 Tablet(s) Oral three times a day  folic acid 1 milliGRAM(s) Oral daily  gabapentin 200 milliGRAM(s) Oral three times a day  HYDROmorphone   Tablet 2 milliGRAM(s) Oral every 3 hours PRN  HYDROmorphone  Injectable 0.5 milliGRAM(s) IV Push every 4 hours PRN  melatonin 3 milliGRAM(s) Oral at bedtime  metoprolol tartrate 12.5 milliGRAM(s) Oral two times a day  minocycline 100 milliGRAM(s) Oral two times a day  multivitamin 1 Tablet(s) Oral daily  ondansetron Injectable 4 milliGRAM(s) IV Push once  pantoprazole    Tablet 40 milliGRAM(s) Oral before breakfast  polyethylene glycol 3350 17 Gram(s) Oral daily  senna 2 Tablet(s) Oral at bedtime PRN  simvastatin 20 milliGRAM(s) Oral at bedtime  tiotropium 18 MICROgram(s) Capsule 1 Capsule(s) Inhalation daily  triamcinolone 0.1% Ointment 1 Application(s) Topical two times a day  warfarin 0.5 milliGRAM(s) Oral once      PHYSICAL EXAM:  GENERAL: NAD,   EYES: conjunctiva and sclera clear  ENMT: Moist mucous membranes  NECK: Supple, No JVD, Normal thyroid  NERVOUS SYSTEM:  Alert & Oriented X3,   CHEST/LUNG: Clear to auscultation bilaterally; No rales, rhonchi, wheezing, or rubs  HEART: Regular rate and rhythm; No murmurs, rubs, or gallops  ABDOMEN: Soft, Nontender, Nondistended; Bowel sounds present  EXTREMITIES:   ltforearm rt knee wound-new VAC placed  , lower abd, and post rt thigh wound VACS, ant thigh wound vac dced  lt lateral and medial thigh wound dressing  LYMPH: No lymphadenopathy noted  SKIN: No rashes or lesions    Consultant(s) Notes Reviewed:  [x ] YES  [ ] NO  Care Discussed with Consultants/Other Providers [ x] YES  [ ] NO    LABS:                        9.2    8.71  )-----------( 283      ( 24 Jun 2018 09:07 )             30.2     06-24    138  |  102  |  32<H>  ----------------------------<  127<H>  4.6   |  24  |  0.59    Ca    9.1      24 Jun 2018 07:41      PT/INR - ( 25 Jun 2018 08:56 )   PT: 31.3 sec;   INR: 2.81 ratio         PTT - ( 24 Jun 2018 20:25 )  PTT:45.4 sec    CAPILLARY BLOOD GLUCOSE                RADIOLOGY & ADDITIONAL TESTS:    Imaging Personally Reviewed:  [x ] YES  [ ] NO

## 2018-06-25 NOTE — DISCHARGE NOTE ADULT - ADDITIONAL INSTRUCTIONS
F/U wound care center-  F/U wound care center-   OOB with knee immobilizer  F/U derm after DC from rehab F/U wound care center-   OOB with knee immobilizer  F/U derm after DC from rehab  Please make an appointment to see  DR Mg next Tuesday (plastic)

## 2018-06-25 NOTE — DISCHARGE NOTE ADULT - CARE PROVIDERS DIRECT ADDRESSES
,[email or web address],[email or web address] ,[email or web address],DirectAddress_Unknown,[email or web address]

## 2018-06-25 NOTE — PROGRESS NOTE ADULT - SUBJECTIVE AND OBJECTIVE BOX
Plastic Surgery Progress Note (pg [facility]: [id number], [facility]: [phone])    SUBJECTIVE:  The patient was seen and examined this morning. No acute events overnight. Afebrile, vital signs stable, pain controlled.    OBJECTIVE:     ** VITAL SIGNS / I&O's **    Vital Signs Last 24 Hrs  T(C): 36.4 (25 Jun 2018 07:38), Max: 36.8 (24 Jun 2018 16:38)  T(F): 97.5 (25 Jun 2018 07:38), Max: 98.3 (24 Jun 2018 16:38)  HR: 60 (25 Jun 2018 07:38) (56 - 69)  BP: 111/72 (25 Jun 2018 07:38) (106/71 - 140/81)  BP(mean): --  RR: 18 (25 Jun 2018 07:38) (18 - 18)  SpO2: 93% (25 Jun 2018 07:38) (93% - 98%)        ** PHYSICAL EXAM **    -- CONSTITUTIONAL: Alert, NAD   -- ABD: VAC intact set to suction  -- Ext: VAC intact holding suction, knee wound with fibrinous exudate at base, sharply debrided at bedside, dressed with wet to dry dressing      ** LABS **                          9.2    8.71  )-----------( 283      ( 24 Jun 2018 09:07 )             30.2     24 Jun 2018 07:41    138    |  102    |  32     ----------------------------<  127    4.6     |  24     |  0.59     Ca    9.1        24 Jun 2018 07:41      PT/INR - ( 25 Jun 2018 08:56 )   PT: 31.3 sec;   INR: 2.81 ratio         PTT - ( 24 Jun 2018 20:25 )  PTT:45.4 sec  CAPILLARY BLOOD GLUCOSE                    ** MEDICATIONS **  MEDICATIONS  (STANDING):  ascorbic acid 500 milliGRAM(s) Oral daily  aspirin enteric coated 81 milliGRAM(s) Oral daily  buDESOnide 160 MICROgram(s)/formoterol 4.5 MICROgram(s) Inhaler 2 Puff(s) Inhalation two times a day  calcium carbonate 1250 mG + Vitamin D (OsCal 500 + D) 1 Tablet(s) Oral three times a day  folic acid 1 milliGRAM(s) Oral daily  gabapentin 200 milliGRAM(s) Oral three times a day  melatonin 3 milliGRAM(s) Oral at bedtime  metoprolol tartrate 12.5 milliGRAM(s) Oral two times a day  minocycline 100 milliGRAM(s) Oral two times a day  multivitamin 1 Tablet(s) Oral daily  ondansetron Injectable 4 milliGRAM(s) IV Push once  pantoprazole    Tablet 40 milliGRAM(s) Oral before breakfast  polyethylene glycol 3350 17 Gram(s) Oral daily  simvastatin 20 milliGRAM(s) Oral at bedtime  tiotropium 18 MICROgram(s) Capsule 1 Capsule(s) Inhalation daily  triamcinolone 0.1% Ointment 1 Application(s) Topical two times a day  warfarin 0.5 milliGRAM(s) Oral once    MEDICATIONS  (PRN):  acetaminophen   Tablet. 650 milliGRAM(s) Oral every 6 hours PRN Mild Pain (1 - 3)  ALPRAZolam 0.25 milliGRAM(s) Oral daily PRN anxiety  bisacodyl Suppository 10 milliGRAM(s) Rectal daily PRN Constipation  bismuth subsalicylate Liquid 30 milliLiter(s) Oral every 6 hours PRN Cramping/abd pain  HYDROmorphone   Tablet 2 milliGRAM(s) Oral every 3 hours PRN Moderate Pain (4 - 6)  HYDROmorphone  Injectable 0.5 milliGRAM(s) IV Push every 4 hours PRN Severe Pain (7 - 10)  senna 2 Tablet(s) Oral at bedtime PRN Constipation

## 2018-06-25 NOTE — DISCHARGE NOTE ADULT - CARE PROVIDER_API CALL
Brian Mg), Surgery  [facility]  [address] [place] [address]  Phone: [phone]  Fax: [phone] Brian Mg), Surgery  [facility]  [address] [facility]  [address]  [place] [address]  Phone: [phone]  Fax: [phone]    Nate Bass), Orthopedics  [address] [place] [address]  Phone: [phone]  Fax: [phone]    wound care, [place]  Phone: (   )    -  Fax: (   )    - Brian Mg), Surgery  [facility]  [address] [facility] [address]  [place] [address]  Phone: [phone]  Fax: [phone]    Nate Bass (MD), Orthopedics  [address] [place] [address]  Phone: [phone]  Fax: [phone]    wound care, [place]  Phone: (   )    -  Fax: (   )    -    Barbara Cruz), Dermatology; Pediatric Dermatology  [address] [place] [address]  Phone: [phone]  Fax: [phone]

## 2018-06-25 NOTE — DISCHARGE NOTE ADULT - MEDICATION SUMMARY - MEDICATIONS TO STOP TAKING
I will STOP taking the medications listed below when I get home from the hospital:    docusate sodium 100 mg oral capsule  -- 1 cap(s) by mouth 3 times a day    R knee Andie  -- Dx: R knee joint infection    HYDROmorphone 4 mg oral tablet  -- 1 tab(s) by mouth every 4 hours, As needed, Severe Pain (7 - 10)    HYDROmorphone 2 mg oral tablet  -- 1 tab(s) by mouth every 4 hours, As needed, Moderate Pain (4 - 6)    nystatin 100,000 units/mL oral suspension  -- 5 milliliter(s) by mouth every 6 hours    povidone iodine 10% topical solution  -- 1 application on skin every 12 hours    zinc oxide 20% topical ointment  -- 1 application on skin once a day    mupirocin 2% topical ointment  -- 1 application on skin 2 times a day    lidocaine 2% topical gel with applicator  -- 1 application on skin once a day, As needed, dressing change    epoetin jenni  -- 62142 unit(s) subcutaneous once a week    ferrous sulfate 324 mg (65 mg elemental iron) oral tablet  -- 1 tab(s) by mouth once a day

## 2018-06-25 NOTE — DISCHARGE NOTE ADULT - CARE PLAN
Principal Discharge DX:	Acute appendicitis, unspecified acute appendicitis type  Secondary Diagnosis:	S/P TAVR (transcatheter aortic valve replacement)  Secondary Diagnosis:	Wound infection  Secondary Diagnosis:	Skin lesions Principal Discharge DX:	Acute appendicitis, unspecified acute appendicitis type  Goal:	resolution of infection --treatedx 10 days of IV antibiotics  Assessment and plan of treatment:	Follow-up with your primary care provider--call for appointment  Secondary Diagnosis:	S/P TAVR (transcatheter aortic valve replacement)  Goal:	continue coumadin  Assessment and plan of treatment:	Follow-up with your cardiologist  Secondary Diagnosis:	Wound infection  Secondary Diagnosis:	Skin lesions  Goal:	improved ---continue prednisone Principal Discharge DX:	Acute appendicitis, unspecified acute appendicitis type  Goal:	resolution of infection --treatedx 10 days of IV antibiotics  Assessment and plan of treatment:	Follow-up with your primary care provider--call for appointment  Secondary Diagnosis:	S/P TAVR (transcatheter aortic valve replacement)  Goal:	continue coumadin  Assessment and plan of treatment:	Follow-up with your cardiologist  Secondary Diagnosis:	Wound infection  Assessment and plan of treatment:	Wound care to follow you while in [facility]  Secondary Diagnosis:	Skin lesions  Goal:	improved ---continue prednisone Principal Discharge DX:	Acute appendicitis, unspecified acute appendicitis type  Goal:	resolution of infection --treatedx 10 days of IV antibiotics  Assessment and plan of treatment:	Follow-up with your primary care provider--call for appointment  Secondary Diagnosis:	S/P TAVR (transcatheter aortic valve replacement)  Goal:	continue coumadin  Assessment and plan of treatment:	Follow-up with your cardiologist  Secondary Diagnosis:	Wound infection  Assessment and plan of treatment:	***For all wounds please use Cavilon on outer edges. Please place wound vacs over Right knee, and Lower abdominal wounds. For the Medial Right thigh wound, please clean wound with normal saline gauze sponge, then cover with Aquacel Ag Extra, then cover with Allevyn (or whatever is available). For Lateral Right thigh wounds, please clean with normal saline, then pack all three wounds with packing lightly moistened with Dakin's, then cover with abdominal pad. Please continue wound vac/dressing changes every Monday, Wednesday, and Friday. Left leg wounds as per routine care.  Please make an appointment to see your Plastic Surgeon Dr. Brian Mg the following Tuesday, call [phone] to make an appointment.  Secondary Diagnosis:	Skin lesions  Goal:	improved ---continue prednisone Principal Discharge DX:	Acute appendicitis, unspecified acute appendicitis type  Goal:	resolution of infection --treatedx 10 days of IV antibiotics  Assessment and plan of treatment:	Follow-up with your primary care provider--call for appointment  Secondary Diagnosis:	S/P TAVR (transcatheter aortic valve replacement)  Goal:	continue coumadin  Assessment and plan of treatment:	Follow-up with your cardiologist  Secondary Diagnosis:	Wound infection  Assessment and plan of treatment:	***For all wounds please use Cavilon on outer edges. Please place wound vacs over Right knee, and Lower abdominal wounds. For the Medial Right thigh wound, please clean wound with normal saline gauze sponge, then cover with Aquacel Ag Extra, then cover with Allevyn (or whatever is available). For Lateral Right thigh wounds, please clean with normal saline, then pack all three wounds with packing lightly moistened with Dakin's, then cover with abdominal pad. Please continue wound vac/dressing changes every Monday, Wednesday, and Friday.   Lt Anterior Medial thigh  Irrigate w/ NS +  Pat dry  + CAVILON to periwound skin + Medihoney +  cover w/ gauze and tegederm  Left Posteriorlateral thigh x 2 wounds  superior: Irrigate w/ NS +  Pat dry  + CAVILON to periwound skin + Pack w/AQUACEL rope  + Cover w/ gauze and tegederm  inferior: Irrigate w/ NS +  Pat dry  + CAVILON to periwound skin + medihoney paste to wound bed + Cover w/ gauze and tegaderm  ***  Please make an appointment to see your Plastic Surgeon Dr. Brian Mg the following Tuesday, call [phone] to make an appointment.  Secondary Diagnosis:	Skin lesions  Goal:	improved ---continue prednisone Principal Discharge DX:	Acute appendicitis, unspecified acute appendicitis type  Goal:	resolution of infection --treated with  10 days of IV antibiotics  Assessment and plan of treatment:	Follow-up with your primary care provider--call for appointment  Secondary Diagnosis:	S/P TAVR (transcatheter aortic valve replacement)  Goal:	continue coumadin  Assessment and plan of treatment:	Follow-up with your cardiologist  Secondary Diagnosis:	Wound infection  Assessment and plan of treatment:	***For all wounds please use Cavilon on outer edges. Please place wound vacs over Right knee, and Lower abdominal wounds. For the Medial Right thigh wound, please clean wound with normal saline gauze sponge, then cover with Aquacel Ag Extra, then cover with Allevyn (or whatever is available). For Lateral Right thigh wounds, please clean with normal saline, then pack all three wounds with packing lightly moistened with Dakin's, then cover with abdominal pad. Please continue wound vac/dressing changes every Monday, Wednesday, and Friday.   Lt Anterior Medial thigh  Irrigate w/ NS +  Pat dry  + CAVILON to periwound skin + Medihoney +  cover w/ gauze and tegederm  Left Posteriorlateral thigh x 2 wounds  superior: Irrigate w/ NS +  Pat dry  + CAVILON to periwound skin + Pack w/AQUACEL rope  + Cover w/ gauze and tegederm  inferior: Irrigate w/ NS +  Pat dry  + CAVILON to periwound skin + medihoney paste to wound bed + Cover w/ gauze and tegaderm  ***  Please make an appointment to see your Plastic Surgeon Dr. Brian Mg call [phone] to make an appointment.  Secondary Diagnosis:	Skin lesions  Goal:	improved ---continue prednisone  Secondary Diagnosis:	Joint infection  Assessment and plan of treatment:	R knee spacer infection with MRSA, continue Minocycline as per ID. F/U with Dr Nate Bass- in 4-6 weeks

## 2018-06-25 NOTE — DISCHARGE NOTE ADULT - PROVIDER TOKENS
TOKEN:'[id number]' TOKEN:'[id number]:'[id number]',FREE:[LAST:[wound care],FIRST:[center],PHONE:[(   )    -],FAX:[(   )    -]] TOKEN:'[id number]:'[id number]',FREE:[LAST:[wound care],FIRST:[center],PHONE:[(   )    -],FAX:[(   )    -]],TOKEN:'[id number]'

## 2018-06-25 NOTE — PROGRESS NOTE ADULT - ASSESSMENT
A/P: s/p Right total knee insertion of spacer, irrigation and debridement, complex wound closure 3/23; readmitted 6/8 for appendicitis  - cont vac change to R thigh and abdomen M/W/F  - Wound care PT start instill VAC therapy to R knee  - remainder of wounds per wound care/derm  - will cont to follow

## 2018-06-25 NOTE — DISCHARGE NOTE ADULT - HOSPITAL COURSE
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 with recent admission from (3/9/2018 to 6/6/2018) for TKR c/b joint infection s/p explantation with multiple wounds managed by plastic surgery who presents from rehab today for evaluation of fever, lethargy, and nausea at rehab found to be febrile here with evidence of appendicitis on CT abdomen, non-operative candidate,      # acute appendicitis  Completed Meropenem,   bcx and ucx ngtd, afebrile   # abd wound, multiple bl leg wounds  continue vac to abdomen, rt posterior thigh and rt Knee  vac changes M/W/F  tissue culture grew coag neg staph which can be normal roopa  pt developed new skin erythematous patchy rash during this admission- derm suspect ?excoriation vs allergy  s/p biopsy of new erythematous site of lt thigh and lt forearm done 6/22-f/u cx and path results  #TKR wound-mrsa-chr mionocycline 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 Pt completed 10 days meropenem.      # Abd wound, multiple bl thigh wounds- now seems to be improving on steroids started 8/18  likely pyoderma gangrenosum vs panniculitis, despite negative biopsies  wounds have been improving with steroids, will continue steroids  ID-swab for afb and fungal cx - neg  (sp punch biopsy by derm 7/11, positive for urticaria, not consistent with PG)  prior biopsies were not consistent with infection/vasculitis/malignancy   pt continued to develop new ulcers, pathergic response to biopsy sites  tissue culture grew coag neg staph likely colonized  continue vac to abdomen, rt Knee  appreciate wound care recs, daily dressing changes, wound vac changes every other day  heme did not feel coumadin necrosis likely given chronic use of coumadin    # Pain control  cont dilaudid 2mg po bid w dressing changes  tramadol prn for pain- pt tolerating well  tylenol po prn  avoid IV dilaudid/IV pain meds  gabapentin 300mg tid  Xanax 0.25 prior to dressing change  bowel regimen    # polymyalgia rheumatica  no signs of active rheum dz, evaluated by rheum 6/29    # TKR wound-mrsa  chronic minocycline suppression    # chr Anemia  stable, monitor h/h closely  appreciate heme recs     # S/P TAVR (transcatheter aortic valve replacement)  daily coumadin dosing, daily INR    # adjustment disorder with depressed mood  xanax prn 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 Pt completed 10 days meropenem.      # Abd wound, multiple bl thigh wounds- now seems to be improving on steroids started 8/18  likely pyoderma gangrenosum vs panniculitis, despite negative biopsies  wounds have been improving with steroids, will continue steroids  ID-swab for afb and fungal cx - neg  (sp punch biopsy by derm 7/11, positive for urticaria, not consistent with PG)  prior biopsies were not consistent with infection/vasculitis/malignancy   pt continued to develop new ulcers, pathergic response to biopsy sites  tissue culture grew coag neg staph likely colonized  continue vac to abdomen, rt Knee  appreciate wound care recs, daily dressing changes, wound vac changes every other day  heme did not feel coumadin necrosis likely given chronic use of coumadin    # polymyalgia rheumatica  no signs of active rheum dz, evaluated by rheum 6/29    # TKR wound-mrsa  chronic minocycline suppression    # chr Anemia  stable, monitor h/h closely  appreciate heme recs     # S/P TAVR (transcatheter aortic valve replacement)  daily coumadin dosing, daily INR    # adjustment disorder with depressed mood  xanax prn 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 Pt completed 10 days meropenem.      # Abd wound, multiple bl thigh wounds- now seems to be improving on steroids started 8/18  likely pyoderma gangrenosum vs panniculitis, despite negative biopsies  wounds have been improving with steroids, will continue steroids  ID-swab for afb and fungal cx - neg  (sp punch biopsy by derm 7/11, positive for urticaria, not consistent with PG)  prior biopsies were not consistent with infection/vasculitis/malignancy   pt continued to develop new ulcers, pathergic response to biopsy sites  tissue culture grew coag neg staph likely colonized  continue vac to abdomen, rt Knee  appreciate wound care recs, daily dressing changes, wound vac changes every other day  heme did not feel coumadin necrosis likely given chronic use of coumadin    # polymyalgia rheumatica  no signs of active rheum dz, evaluated by rheum 6/29    # TKR wound-mrsa  chronic minocycline suppression    # chr Anemia  stable, monitor h/h closely  appreciate heme recs     # S/P TAVR (transcatheter aortic valve replacement)  daily coumadin dosing, daily INR   Cleared for discharge as per Dr Marrero. 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 lethargy likely 2/2  metabolic encephalopathy.  Pt completed 10 days meropenem with great clinical improvement.      # Abd wound, multiple bl thigh wounds- now seems to be improving on steroids started 8/18  likely pyoderma gangrenosum vs panniculitis, despite negative biopsies  wounds have been improving with steroids, will continue steroids  ID-swab for afb and fungal cx - neg  (sp punch biopsy by derm 7/11, positive for urticaria, not consistent with PG)  prior biopsies were not consistent with infection/vasculitis/malignancy   pt continued to develop new ulcers, pathergic response to biopsy sites  tissue culture grew coag neg staph likely colonized  continue vac to abdomen, rt Knee  appreciate wound care recs, daily dressing changes, wound vac changes every other day  heme did not feel coumadin necrosis likely given chronic use of coumadin    # polymyalgia rheumatica  no signs of active rheum dz, evaluated by rheum 6/29    # TKR wound-mrsa  chronic minocycline suppression    # chr Anemia  stable, monitor h/h closely  appreciate heme recs     # S/P TAVR (transcatheter aortic valve replacement)  daily coumadin dosing, daily INR   Cleared for discharge as per Dr Marrero. DC to Rehab

## 2018-06-25 NOTE — PROGRESS NOTE ADULT - ASSESSMENT
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 with recent admission from (3/9/2018 to 6/6/2018) for TKR c/b joint infection s/p explantation with multiple wounds managed by plastic surgery who presents from rehab today for evaluation of fever, lethargy, and nausea at rehab found to be febrile here with evidence of appendicitis on CT abdomen, non-operative candidate,     # acute appendicitis  Completed Meropenem, off abx 1wk   bcx and ucx ngtd, afebrile  ID c/s appreciated      # abd wound, multiple bl leg wounds  continue vac to abdomen, rt posterior thigh and rt Knee  vac changes M/W/F  appreciate wound care recs, orders written per wound care  cont NS irrigation, cavilon, medihoney  pain control- dilaudid  appreciate derm recs, unlikely PG   tissue culture grew coag neg staph which can be normal roopa    dw derm fellow : awaiting wound cx from rt thigh to r/o atypical mycobacteria-so far neg, path- no microorganism  pt developed new skin erythematous patchy rash during this admission- derm suspect ?excoriation vs allergy  s/p biopsy of new erythematous site of lt thigh and lt forearm done 6/22-f/u cx and path results    #TKR wound-mrsa-chr mionocycline    # Pain control  dilaudid2 po q4 prn, 0.5 iv prior to dressing change  bowel regimen    # chr Anemia  stable, monitor h/h closely    # S/P TAVR (transcatheter aortic valve replacement)  continue Coumadin, daily INR    # adjustment disorder with depressed mood  appreciate psych re eval  xanax prn    plan of care dw patient

## 2018-06-25 NOTE — DISCHARGE NOTE ADULT - PATIENT PORTAL LINK FT
You can access the [facility] Portal, offered by [facility], by registering with the following website: [email or web address]

## 2018-06-25 NOTE — DISCHARGE NOTE ADULT - MEDICATION SUMMARY - MEDICATIONS TO CHANGE
I will SWITCH the dose or number of times a day I take the medications listed below when I get home from the hospital:    polyethylene glycol 3350 oral powder for reconstitution  -- 17 gram(s) by mouth once a day    gabapentin 100 mg oral capsule  -- 2 cap(s) by mouth 3 times a day    sodium hypochlorite 0.125% topical solution  -- 1 application on skin once a day

## 2018-06-25 NOTE — DISCHARGE NOTE ADULT - MEDICATION SUMMARY - MEDICATIONS TO TAKE
I will START or STAY ON the medications listed below when I get home from the hospital:    predniSONE 50 mg oral tablet  -- 1 tab(s) by mouth once a day  -- Indication: For Steroid for skin lesions    acetaminophen 325 mg oral tablet  -- 2 tab(s) by mouth every 6 hours, As needed, Mild Pain (1 - 3)  -- Indication: For pain    traMADol 50 mg oral tablet  -- 0.5 tab(s) by mouth 3 times a day, As needed, Moderate Pain (4 - 6)  -- Indication: For pain    aspirin 81 mg oral delayed release tablet  -- 1 tab(s) by mouth once a day  -- Indication: For Stroke prevention    magnesium hydroxide 8% oral suspension  -- 30 milliliter(s) by mouth once a day, As needed, Constipation  -- Indication: For constipation    Coumadin 1 mg oral tablet  -- 0.5 tab(s) by mouth once a day (at bedtime)  adjust dose to keep INR between 2-3  -- Indication: For S/P TAVR (transcatheter aortic valve replacement)/DVT    gabapentin 300 mg oral capsule  -- 1 cap(s) by mouth 3 times a day  -- Indication: For neuropathy    bismuth subsalicylate 262 mg/15 mL oral suspension  -- 30 milliliter(s) by mouth every 6 hours, As needed, Cramping/abd pain  -- Indication: For Abdominal pain    loratadine 10 mg oral tablet  -- 1 tab(s) by mouth once a day  -- Indication: For Allergy    simvastatin 20 mg oral tablet  -- 1 tab(s) by mouth once a day (at bedtime)  -- Indication: For cholesterol    sodium hypochlorite 0.125% topical solution  -- 1 application on skin 2 times a day  to R lateral thigh, irrigate with saline, pat dry, pack with dakin moistened gauze roll  cover with gauze, secure with tegaderm twice daily  -- Indication: For Wound care    metoprolol  -- 12.5 milligram(s) by mouth 2 times a day  -- Indication: For blood pressure/heart    Advair Diskus 250 mcg-50 mcg inhalation powder  -- 1 puff(s) inhaled 2 times a day  -- Indication: For Asthma    tiotropium 18 mcg inhalation capsule  -- 1 cap(s) inhaled once a day  -- Indication: For copd    betamethasone valerate 0.1% topical ointment  -- 1 application on skin 2 times a day  -- Indication: For Skin lesions    polyethylene glycol 3350 oral powder for reconstitution  -- 17 gram(s) by mouth 2 times a day  -- Indication: For constipation    senna oral tablet  -- 2 tab(s) by mouth once a day (at bedtime), As needed, Constipation  -- Indication: For constipation    bisacodyl 10 mg rectal suppository  -- 1 suppository(ies) rectally once a day, As needed, Constipation  -- Indication: For constipation    simethicone 80 mg oral tablet, chewable  -- 1 tab(s) by mouth 2 times a day, As needed, Gas  -- Indication: For gas pain    melatonin 3 mg oral tablet  -- 1 tab(s) by mouth once a day (at bedtime)  -- Indication: For Sleep    pantoprazole 40 mg oral delayed release tablet  -- 1 tab(s) by mouth once a day (1/2 hour before breakfast)  -- Indication: For Stomach    minocycline 100 mg oral capsule  -- 1 cap(s) by mouth 2 times a day  -- Indication: For MRSA suppression    calcium-vitamin D 500 mg-200 intl units oral tablet  -- 1 tab(s) by mouth 3 times a day  -- Indication: For Supplement    Multiple Vitamins oral tablet  -- 1 tab(s) by mouth once a day  -- Indication: For Supplement    ascorbic acid 500 mg oral tablet  -- 1 tab(s) by mouth once a day  -- Indication: For Supplement    folic acid 1 mg oral tablet  -- 1 tab(s) by mouth once a day  -- Indication: For Supplement

## 2018-06-25 NOTE — DISCHARGE NOTE ADULT - PLAN OF CARE
resolution of infection --treatedx 10 days of IV antibiotics Follow-up with your primary care provider--call for appointment continue coumadin Follow-up with your cardiologist improved ---continue prednisone Wound care to follow you while in [place] ***For all wounds please use Cavilon on outer edges. Please place wound vacs over Right knee, and Lower abdominal wounds. For the Medial Right thigh wound, please clean wound with normal saline gauze sponge, then cover with Aquacel Ag Extra, then cover with Allevyn (or whatever is available). For Lateral Right thigh wounds, please clean with normal saline, then pack all three wounds with packing lightly moistened with Dakin's, then cover with abdominal pad. Please continue wound vac/dressing changes every Monday, Wednesday, and Friday. Left leg wounds as per routine care.  Please make an appointment to see your Plastic Surgeon Dr. Brian Mg the following Tuesday, call [phone] to make an appointment. ***For all wounds please use Cavilon on outer edges. Please place wound vacs over Right knee, and Lower abdominal wounds. For the Medial Right thigh wound, please clean wound with normal saline gauze sponge, then cover with Aquacel Ag Extra, then cover with Allevyn (or whatever is available). For Lateral Right thigh wounds, please clean with normal saline, then pack all three wounds with packing lightly moistened with Dakin's, then cover with abdominal pad. Please continue wound vac/dressing changes every Monday, Wednesday, and Friday.   Lt Anterior Medial thigh  Irrigate w/ NS +  Pat dry  + CAVILON to periwound skin + Medihoney +  cover w/ gauze and tegederm  Left Posteriorlateral thigh x 2 wounds  superior: Irrigate w/ NS +  Pat dry  + CAVILON to periwound skin + Pack w/AQUACEL rope  + Cover w/ gauze and tegederm  inferior: Irrigate w/ NS +  Pat dry  + CAVILON to periwound skin + medihoney paste to wound bed + Cover w/ gauze and tegaderm  ***  Please make an appointment to see your Plastic Surgeon Dr. Brian Mg the following Tuesday, call [phone] to make an appointment. resolution of infection --treated with  10 days of IV antibiotics ***For all wounds please use Cavilon on outer edges. Please place wound vacs over Right knee, and Lower abdominal wounds. For the Medial Right thigh wound, please clean wound with normal saline gauze sponge, then cover with Aquacel Ag Extra, then cover with Allevyn (or whatever is available). For Lateral Right thigh wounds, please clean with normal saline, then pack all three wounds with packing lightly moistened with Dakin's, then cover with abdominal pad. Please continue wound vac/dressing changes every Monday, Wednesday, and Friday.   Lt Anterior Medial thigh  Irrigate w/ NS +  Pat dry  + CAVILON to periwound skin + Medihoney +  cover w/ gauze and tegederm  Left Posteriorlateral thigh x 2 wounds  superior: Irrigate w/ NS +  Pat dry  + CAVILON to periwound skin + Pack w/AQUACEL rope  + Cover w/ gauze and tegederm  inferior: Irrigate w/ NS +  Pat dry  + CAVILON to periwound skin + medihoney paste to wound bed + Cover w/ gauze and tegaderm  ***  Please make an appointment to see your Plastic Surgeon Dr. Brian Mg call [phone] to make an appointment. R knee spacer infection with MRSA, continue Minocycline as per ID. F/U with Dr Nate Bass- in 4-6 weeks

## 2018-06-26 LAB
INR BLD: 2.9 RATIO — HIGH (ref 0.88–1.16)
PROTHROM AB SERPL-ACNC: 32 SEC — HIGH (ref 9.8–12.7)

## 2018-06-26 PROCEDURE — 99232 SBSQ HOSP IP/OBS MODERATE 35: CPT

## 2018-06-26 RX ORDER — WARFARIN SODIUM 2.5 MG/1
0.5 TABLET ORAL ONCE
Qty: 0 | Refills: 0 | Status: COMPLETED | OUTPATIENT
Start: 2018-06-26 | End: 2018-06-26

## 2018-06-26 RX ADMIN — Medication 1 TABLET(S): at 19:02

## 2018-06-26 RX ADMIN — POLYETHYLENE GLYCOL 3350 17 GRAM(S): 17 POWDER, FOR SOLUTION ORAL at 10:33

## 2018-06-26 RX ADMIN — Medication 500 MILLIGRAM(S): at 16:38

## 2018-06-26 RX ADMIN — HYDROMORPHONE HYDROCHLORIDE 0.5 MILLIGRAM(S): 2 INJECTION INTRAMUSCULAR; INTRAVENOUS; SUBCUTANEOUS at 13:04

## 2018-06-26 RX ADMIN — Medication 30 MILLILITER(S): at 23:39

## 2018-06-26 RX ADMIN — HYDROMORPHONE HYDROCHLORIDE 0.5 MILLIGRAM(S): 2 INJECTION INTRAMUSCULAR; INTRAVENOUS; SUBCUTANEOUS at 19:02

## 2018-06-26 RX ADMIN — GABAPENTIN 200 MILLIGRAM(S): 400 CAPSULE ORAL at 21:01

## 2018-06-26 RX ADMIN — Medication 1 MILLIGRAM(S): at 16:38

## 2018-06-26 RX ADMIN — HYDROMORPHONE HYDROCHLORIDE 0.5 MILLIGRAM(S): 2 INJECTION INTRAMUSCULAR; INTRAVENOUS; SUBCUTANEOUS at 04:57

## 2018-06-26 RX ADMIN — HYDROMORPHONE HYDROCHLORIDE 0.5 MILLIGRAM(S): 2 INJECTION INTRAMUSCULAR; INTRAVENOUS; SUBCUTANEOUS at 21:31

## 2018-06-26 RX ADMIN — Medication 1 TABLET(S): at 07:01

## 2018-06-26 RX ADMIN — Medication 3 MILLIGRAM(S): at 21:03

## 2018-06-26 RX ADMIN — BUDESONIDE AND FORMOTEROL FUMARATE DIHYDRATE 2 PUFF(S): 160; 4.5 AEROSOL RESPIRATORY (INHALATION) at 16:39

## 2018-06-26 RX ADMIN — SENNA PLUS 2 TABLET(S): 8.6 TABLET ORAL at 16:39

## 2018-06-26 RX ADMIN — Medication 1 TABLET(S): at 21:01

## 2018-06-26 RX ADMIN — Medication 81 MILLIGRAM(S): at 16:38

## 2018-06-26 RX ADMIN — GABAPENTIN 200 MILLIGRAM(S): 400 CAPSULE ORAL at 06:59

## 2018-06-26 RX ADMIN — HYDROMORPHONE HYDROCHLORIDE 0.5 MILLIGRAM(S): 2 INJECTION INTRAMUSCULAR; INTRAVENOUS; SUBCUTANEOUS at 04:02

## 2018-06-26 RX ADMIN — MINOCYCLINE HYDROCHLORIDE 100 MILLIGRAM(S): 45 TABLET, EXTENDED RELEASE ORAL at 06:59

## 2018-06-26 RX ADMIN — WARFARIN SODIUM 0.5 MILLIGRAM(S): 2.5 TABLET ORAL at 21:01

## 2018-06-26 RX ADMIN — Medication 30 MILLILITER(S): at 07:10

## 2018-06-26 RX ADMIN — Medication 1 APPLICATION(S): at 07:00

## 2018-06-26 RX ADMIN — HYDROMORPHONE HYDROCHLORIDE 0.5 MILLIGRAM(S): 2 INJECTION INTRAMUSCULAR; INTRAVENOUS; SUBCUTANEOUS at 12:14

## 2018-06-26 RX ADMIN — BUDESONIDE AND FORMOTEROL FUMARATE DIHYDRATE 2 PUFF(S): 160; 4.5 AEROSOL RESPIRATORY (INHALATION) at 06:59

## 2018-06-26 RX ADMIN — HYDROMORPHONE HYDROCHLORIDE 0.5 MILLIGRAM(S): 2 INJECTION INTRAMUSCULAR; INTRAVENOUS; SUBCUTANEOUS at 21:01

## 2018-06-26 RX ADMIN — MINOCYCLINE HYDROCHLORIDE 100 MILLIGRAM(S): 45 TABLET, EXTENDED RELEASE ORAL at 16:38

## 2018-06-26 RX ADMIN — Medication 12.5 MILLIGRAM(S): at 16:38

## 2018-06-26 RX ADMIN — GABAPENTIN 200 MILLIGRAM(S): 400 CAPSULE ORAL at 16:39

## 2018-06-26 RX ADMIN — HYDROMORPHONE HYDROCHLORIDE 0.5 MILLIGRAM(S): 2 INJECTION INTRAMUSCULAR; INTRAVENOUS; SUBCUTANEOUS at 16:35

## 2018-06-26 RX ADMIN — SIMVASTATIN 20 MILLIGRAM(S): 20 TABLET, FILM COATED ORAL at 21:01

## 2018-06-26 RX ADMIN — PANTOPRAZOLE SODIUM 40 MILLIGRAM(S): 20 TABLET, DELAYED RELEASE ORAL at 07:00

## 2018-06-26 RX ADMIN — TIOTROPIUM BROMIDE 1 CAPSULE(S): 18 CAPSULE ORAL; RESPIRATORY (INHALATION) at 16:39

## 2018-06-26 RX ADMIN — Medication 30 MILLILITER(S): at 12:13

## 2018-06-26 RX ADMIN — Medication 1 TABLET(S): at 16:38

## 2018-06-26 RX ADMIN — Medication 12.5 MILLIGRAM(S): at 06:59

## 2018-06-26 RX ADMIN — Medication 0.25 MILLIGRAM(S): at 21:01

## 2018-06-26 NOTE — PROGRESS NOTE ADULT - SUBJECTIVE AND OBJECTIVE BOX
pain controlled, no pain today  lying in bed    abdominal wound - vac  RLE  thigh - vac and dressing per wound care teams  knee wound with stable fibrinous base, dressed by prs  remainder of wounds dressed by wound care  5/5 ta/ehl/gcs  silt l4-s1  2+ dp pulse    ROS: depressed

## 2018-06-26 NOTE — CHART NOTE - NSCHARTNOTEFT_GEN_A_CORE
Spoke to pt and team re: bx results, revealing urticaria and excoriation.   Pt has no insight for excoriation.  Spoke to pt's daughter over the phone.    Will formally communicate biopsy result with attending tomorrow afternoon.    Pt is stable and there is no additional intervention needed for her skin condition at this time.   Appreciate plastic for Vacs and nursing for dressing of the ulcers.

## 2018-06-26 NOTE — CHART NOTE - NSCHARTNOTEFT_GEN_A_CORE
Malnutrition Follow Up     Pt admitted c acute appendicitis (completed antibiotics), abdominal wound and multiple luann. leg wounds, noted wound VAC continues to be in place, noted Dermatology following. Interim events noted.     Source: Patient [x]        Diet : regular c Nepro x 2 and Howard x 2 packets daily       Patient reports  [x] other: she has been eating as tolerated. Reports she did have some abdominal cramping yesterday and kept it bland today, had some chicken noodle soup bought by private aide for lunch. States she continues to take Nepro and Howard daily. Agreeable to food preferences-cheese sticks/rectangles. Reports she is having BMs.     Current Weight: no new wt at this time     Pertinent Medications: MEDICATIONS  (STANDING):  ascorbic acid 500 milliGRAM(s) Oral daily  aspirin enteric coated 81 milliGRAM(s) Oral daily  buDESOnide 160 MICROgram(s)/formoterol 4.5 MICROgram(s) Inhaler 2 Puff(s) Inhalation two times a day  calcium carbonate 1250 mG + Vitamin D (OsCal 500 + D) 1 Tablet(s) Oral three times a day  folic acid 1 milliGRAM(s) Oral daily  gabapentin 200 milliGRAM(s) Oral three times a day  melatonin 3 milliGRAM(s) Oral at bedtime  metoprolol tartrate 12.5 milliGRAM(s) Oral two times a day  minocycline 100 milliGRAM(s) Oral two times a day  multivitamin 1 Tablet(s) Oral daily  ondansetron Injectable 4 milliGRAM(s) IV Push once  pantoprazole    Tablet 40 milliGRAM(s) Oral before breakfast  polyethylene glycol 3350 17 Gram(s) Oral daily  simvastatin 20 milliGRAM(s) Oral at bedtime  tiotropium 18 MICROgram(s) Capsule 1 Capsule(s) Inhalation daily  triamcinolone 0.1% Ointment 1 Application(s) Topical two times a day  warfarin 0.5 milliGRAM(s) Oral once    MEDICATIONS  (PRN):  acetaminophen   Tablet. 650 milliGRAM(s) Oral every 6 hours PRN Mild Pain (1 - 3)  ALPRAZolam 0.25 milliGRAM(s) Oral daily PRN anxiety  bisacodyl Suppository 10 milliGRAM(s) Rectal daily PRN Constipation  bismuth subsalicylate Liquid 30 milliLiter(s) Oral every 6 hours PRN Cramping/abd pain  HYDROmorphone   Tablet 2 milliGRAM(s) Oral every 3 hours PRN Moderate Pain (4 - 6)  HYDROmorphone  Injectable 0.5 milliGRAM(s) IV Push every 4 hours PRN Severe Pain (7 - 10)  senna 2 Tablet(s) Oral at bedtime PRN Constipation    Pertinent Labs:  06-24 Na138 mmol/L Glu 127 mg/dL<H> K+ 4.6 mmol/L Cr  0.59 mg/dL BUN 32 mg/dL<H>      Skin: intact, +2 luann. leg edema     Estimated Needs:   [x] no change since previous assessment        Previous Nutrition Diagnosis:      [x] Malnutrition          Nutrition Diagnosis is [x] ongoing- addressed c supplements at this time          New Nutrition Diagnosis: [x] not applicable      Recommend    [x] Continue c current diet.     [x] Nutrition Supplement: Continue c Nepro and Howard.    [x] Encouraged PO intake-small, frequent meals and nutrient dense snacks. Discussed protein rich foods available on menu. Discussed consuming protein first at meal times and then eating the other foods.      Monitoring and Evaluation:     [x] PO intake [x] Tolerance to diet prescription [x] weights [x] follow up per protocol    RD remains available.   Heidi Cui, MS, RD, , CNSC #[phone]

## 2018-06-26 NOTE — PROGRESS NOTE ADULT - SUBJECTIVE AND OBJECTIVE BOX
Patient is a 77y old  Female who presents with a chief complaint of fever, lethargy (07 Jun 2018 22:19)      INTERVAL HPI/OVERNIGHT EVENTS: none, feels well      Vital Signs Last 24 Hrs  T(C): 36.6 (26 Jun 2018 08:12), Max: 36.6 (25 Jun 2018 16:15)  T(F): 97.9 (26 Jun 2018 08:12), Max: 97.9 (25 Jun 2018 16:15)  HR: 60 (26 Jun 2018 08:12) (60 - 71)  BP: 127/78 (26 Jun 2018 08:12) (111/67 - 131/82)  BP(mean): --  RR: 18 (26 Jun 2018 08:12) (18 - 18)  SpO2: 98% (26 Jun 2018 08:12) (96% - 98%)    acetaminophen   Tablet. 650 milliGRAM(s) Oral every 6 hours PRN  ALPRAZolam 0.25 milliGRAM(s) Oral daily PRN  ascorbic acid 500 milliGRAM(s) Oral daily  aspirin enteric coated 81 milliGRAM(s) Oral daily  bisacodyl Suppository 10 milliGRAM(s) Rectal daily PRN  bismuth subsalicylate Liquid 30 milliLiter(s) Oral every 6 hours PRN  buDESOnide 160 MICROgram(s)/formoterol 4.5 MICROgram(s) Inhaler 2 Puff(s) Inhalation two times a day  calcium carbonate 1250 mG + Vitamin D (OsCal 500 + D) 1 Tablet(s) Oral three times a day  folic acid 1 milliGRAM(s) Oral daily  gabapentin 200 milliGRAM(s) Oral three times a day  HYDROmorphone   Tablet 2 milliGRAM(s) Oral every 3 hours PRN  HYDROmorphone  Injectable 0.5 milliGRAM(s) IV Push every 4 hours PRN  melatonin 3 milliGRAM(s) Oral at bedtime  metoprolol tartrate 12.5 milliGRAM(s) Oral two times a day  minocycline 100 milliGRAM(s) Oral two times a day  multivitamin 1 Tablet(s) Oral daily  ondansetron Injectable 4 milliGRAM(s) IV Push once  pantoprazole    Tablet 40 milliGRAM(s) Oral before breakfast  polyethylene glycol 3350 17 Gram(s) Oral daily  senna 2 Tablet(s) Oral at bedtime PRN  simvastatin 20 milliGRAM(s) Oral at bedtime  tiotropium 18 MICROgram(s) Capsule 1 Capsule(s) Inhalation daily  triamcinolone 0.1% Ointment 1 Application(s) Topical two times a day  warfarin 0.5 milliGRAM(s) Oral once      PHYSICAL EXAM:  GENERAL: NAD,   EYES: conjunctiva and sclera clear  ENMT: Moist mucous membranes  NECK: Supple, No JVD, Normal thyroid  NERVOUS SYSTEM:  Alert & Oriented X3,   CHEST/LUNG: Clear to auscultation bilaterally; No rales, rhonchi, wheezing, or rubs  HEART: Regular rate and rhythm; No murmurs, rubs, or gallops  ABDOMEN: Soft, Nontender, Nondistended; Bowel sounds present  EXTREMITIES:   rt knee wound-new VAC placed   lower abd, and post rt thigh wound VACS in place, ant thigh wound vac dced  lt lateral and medial thigh wound dressing  LYMPH: No lymphadenopathy noted  SKIN: No rashes or lesions    Consultant(s) Notes Reviewed:  [x ] YES  [ ] NO  Care Discussed with Consultants/Other Providers [ x] YES  [ ] NO    LABS:          PT/INR - ( 26 Jun 2018 07:14 )   PT: 32.0 sec;   INR: 2.90 ratio         PTT - ( 24 Jun 2018 20:25 )  PTT:45.4 sec    CAPILLARY BLOOD GLUCOSE                RADIOLOGY & ADDITIONAL TESTS:    Imaging Personally Reviewed:  [ ] YES  [ ] NO Patient is a 77y old  Female who presents with a chief complaint of fever, lethargy (07 Jun 2018 22:19)      INTERVAL HPI/OVERNIGHT EVENTS: none, c/o abd cramps since last night, better this am      Vital Signs Last 24 Hrs  T(C): 36.6 (26 Jun 2018 08:12), Max: 36.6 (25 Jun 2018 16:15)  T(F): 97.9 (26 Jun 2018 08:12), Max: 97.9 (25 Jun 2018 16:15)  HR: 60 (26 Jun 2018 08:12) (60 - 71)  BP: 127/78 (26 Jun 2018 08:12) (111/67 - 131/82)  BP(mean): --  RR: 18 (26 Jun 2018 08:12) (18 - 18)  SpO2: 98% (26 Jun 2018 08:12) (96% - 98%)    acetaminophen   Tablet. 650 milliGRAM(s) Oral every 6 hours PRN  ALPRAZolam 0.25 milliGRAM(s) Oral daily PRN  ascorbic acid 500 milliGRAM(s) Oral daily  aspirin enteric coated 81 milliGRAM(s) Oral daily  bisacodyl Suppository 10 milliGRAM(s) Rectal daily PRN  bismuth subsalicylate Liquid 30 milliLiter(s) Oral every 6 hours PRN  buDESOnide 160 MICROgram(s)/formoterol 4.5 MICROgram(s) Inhaler 2 Puff(s) Inhalation two times a day  calcium carbonate 1250 mG + Vitamin D (OsCal 500 + D) 1 Tablet(s) Oral three times a day  folic acid 1 milliGRAM(s) Oral daily  gabapentin 200 milliGRAM(s) Oral three times a day  HYDROmorphone   Tablet 2 milliGRAM(s) Oral every 3 hours PRN  HYDROmorphone  Injectable 0.5 milliGRAM(s) IV Push every 4 hours PRN  melatonin 3 milliGRAM(s) Oral at bedtime  metoprolol tartrate 12.5 milliGRAM(s) Oral two times a day  minocycline 100 milliGRAM(s) Oral two times a day  multivitamin 1 Tablet(s) Oral daily  ondansetron Injectable 4 milliGRAM(s) IV Push once  pantoprazole    Tablet 40 milliGRAM(s) Oral before breakfast  polyethylene glycol 3350 17 Gram(s) Oral daily  senna 2 Tablet(s) Oral at bedtime PRN  simvastatin 20 milliGRAM(s) Oral at bedtime  tiotropium 18 MICROgram(s) Capsule 1 Capsule(s) Inhalation daily  triamcinolone 0.1% Ointment 1 Application(s) Topical two times a day  warfarin 0.5 milliGRAM(s) Oral once      PHYSICAL EXAM:  GENERAL: NAD,   EYES: conjunctiva and sclera clear  ENMT: Moist mucous membranes  NECK: Supple, No JVD, Normal thyroid  NERVOUS SYSTEM:  Alert & Oriented X3,   CHEST/LUNG: Clear to auscultation bilaterally; No rales, rhonchi, wheezing, or rubs  HEART: Regular rate and rhythm; No murmurs, rubs, or gallops  ABDOMEN: Soft, Nontender, Nondistended; Bowel sounds present  EXTREMITIES:   rt knee wound-new VAC placed   lower abd, and post rt thigh wound VACS in place, ant thigh wound vac dced  lt lateral and medial thigh wound dressing  LYMPH: No lymphadenopathy noted  SKIN: No rashes or lesions    Consultant(s) Notes Reviewed:  [x ] YES  [ ] NO  Care Discussed with Consultants/Other Providers [ x] YES  [ ] NO    LABS:          PT/INR - ( 26 Jun 2018 07:14 )   PT: 32.0 sec;   INR: 2.90 ratio         PTT - ( 24 Jun 2018 20:25 )  PTT:45.4 sec    CAPILLARY BLOOD GLUCOSE                RADIOLOGY & ADDITIONAL TESTS:    Imaging Personally Reviewed:  [ ] YES  [ ] NO

## 2018-06-26 NOTE — PROGRESS NOTE ADULT - ATTENDING COMMENTS
Reina Murphy MD   [facility]   Reina Murphy MD  [facility]    Dr Marrero will be covering me starting   6/ 27 /18  Please page

## 2018-06-26 NOTE — PROGRESS NOTE ADULT - ASSESSMENT
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 [employer] Model [id number], MVR, 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 with recent admission from (3/9/2018 to 6/6/2018) for TKR c/b joint infection s/p explantation with multiple wounds managed by plastic surgery who presents from rehab today for evaluation of fever, lethargy, and nausea at rehab found to be febrile here with evidence of appendicitis on CT abdomen, non-operative candidate,     # acute appendicitis  Completed 10d course Meropenem, off abx for >1wk   bcx and ucx ngtd, afebrile  ID c/s appreciated    # abd wound, multiple bl leg wounds  continue vac to abdomen, rt posterior thigh and rt Knee  vac changes M/W/F  appreciate wound care recs, orders written per wound care  cont NS irrigation, cavilon, medihoney  pain control- dilaudid  appreciate derm recs, unlikely PG   tissue culture grew coag neg staph which can be normal roopa    awaiting wound cx from rt thigh to r/o atypical mycobacteria-so far neg, path- no microorganism  pt developed new erythematous patchy rash during this admission- derm suspect ?excoriation vs allergy  s/p biopsy of new erythematous site of lt thigh and lt forearm done 6/22-f/u cx and path results    #TKR wound-mrsa-chr mionocycline    # Pain control  dilaudid2 po q4 prn, 0.5 iv prior to dressing change  bowel regimen    # chr Anemia  stable, monitor h/h closely    # S/P TAVR (transcatheter aortic valve replacement)  continue Coumadin, daily INR    # adjustment disorder with depressed mood  appreciate psych re eval  xanax prn    plan of care dw patient

## 2018-06-27 DIAGNOSIS — L50.9 URTICARIA, UNSPECIFIED: ICD-10-CM

## 2018-06-27 LAB
INR BLD: 2.46 RATIO — HIGH (ref 0.88–1.16)
PROTHROM AB SERPL-ACNC: 27.3 SEC — HIGH (ref 9.8–12.7)

## 2018-06-27 PROCEDURE — 99232 SBSQ HOSP IP/OBS MODERATE 35: CPT | Mod: 25

## 2018-06-27 PROCEDURE — 97605 NEG PRS WND THER DME<=50SQCM: CPT | Mod: 59

## 2018-06-27 PROCEDURE — 97597 DBRDMT OPN WND 1ST 20 CM/<: CPT

## 2018-06-27 RX ORDER — LORATADINE 10 MG/1
10 TABLET ORAL DAILY
Qty: 0 | Refills: 0 | Status: DISCONTINUED | OUTPATIENT
Start: 2018-06-27 | End: 2018-08-16

## 2018-06-27 RX ORDER — SIMETHICONE 80 MG/1
80 TABLET, CHEWABLE ORAL
Qty: 0 | Refills: 0 | Status: DISCONTINUED | OUTPATIENT
Start: 2018-06-27 | End: 2018-08-16

## 2018-06-27 RX ORDER — WARFARIN SODIUM 2.5 MG/1
1 TABLET ORAL ONCE
Qty: 0 | Refills: 0 | Status: COMPLETED | OUTPATIENT
Start: 2018-06-27 | End: 2018-06-27

## 2018-06-27 RX ADMIN — TIOTROPIUM BROMIDE 1 CAPSULE(S): 18 CAPSULE ORAL; RESPIRATORY (INHALATION) at 13:30

## 2018-06-27 RX ADMIN — Medication 12.5 MILLIGRAM(S): at 18:19

## 2018-06-27 RX ADMIN — SIMVASTATIN 20 MILLIGRAM(S): 20 TABLET, FILM COATED ORAL at 21:47

## 2018-06-27 RX ADMIN — HYDROMORPHONE HYDROCHLORIDE 0.5 MILLIGRAM(S): 2 INJECTION INTRAMUSCULAR; INTRAVENOUS; SUBCUTANEOUS at 22:00

## 2018-06-27 RX ADMIN — HYDROMORPHONE HYDROCHLORIDE 0.5 MILLIGRAM(S): 2 INJECTION INTRAMUSCULAR; INTRAVENOUS; SUBCUTANEOUS at 14:30

## 2018-06-27 RX ADMIN — Medication 12.5 MILLIGRAM(S): at 05:35

## 2018-06-27 RX ADMIN — GABAPENTIN 200 MILLIGRAM(S): 400 CAPSULE ORAL at 21:47

## 2018-06-27 RX ADMIN — Medication 1 TABLET(S): at 13:29

## 2018-06-27 RX ADMIN — Medication 3 MILLIGRAM(S): at 21:47

## 2018-06-27 RX ADMIN — HYDROMORPHONE HYDROCHLORIDE 0.5 MILLIGRAM(S): 2 INJECTION INTRAMUSCULAR; INTRAVENOUS; SUBCUTANEOUS at 09:53

## 2018-06-27 RX ADMIN — HYDROMORPHONE HYDROCHLORIDE 0.5 MILLIGRAM(S): 2 INJECTION INTRAMUSCULAR; INTRAVENOUS; SUBCUTANEOUS at 01:56

## 2018-06-27 RX ADMIN — MINOCYCLINE HYDROCHLORIDE 100 MILLIGRAM(S): 45 TABLET, EXTENDED RELEASE ORAL at 05:35

## 2018-06-27 RX ADMIN — HYDROMORPHONE HYDROCHLORIDE 0.5 MILLIGRAM(S): 2 INJECTION INTRAMUSCULAR; INTRAVENOUS; SUBCUTANEOUS at 01:26

## 2018-06-27 RX ADMIN — GABAPENTIN 200 MILLIGRAM(S): 400 CAPSULE ORAL at 13:28

## 2018-06-27 RX ADMIN — BUDESONIDE AND FORMOTEROL FUMARATE DIHYDRATE 2 PUFF(S): 160; 4.5 AEROSOL RESPIRATORY (INHALATION) at 05:35

## 2018-06-27 RX ADMIN — PANTOPRAZOLE SODIUM 40 MILLIGRAM(S): 20 TABLET, DELAYED RELEASE ORAL at 05:35

## 2018-06-27 RX ADMIN — Medication 1 APPLICATION(S): at 05:36

## 2018-06-27 RX ADMIN — POLYETHYLENE GLYCOL 3350 17 GRAM(S): 17 POWDER, FOR SOLUTION ORAL at 13:30

## 2018-06-27 RX ADMIN — Medication 500 MILLIGRAM(S): at 13:29

## 2018-06-27 RX ADMIN — HYDROMORPHONE HYDROCHLORIDE 0.5 MILLIGRAM(S): 2 INJECTION INTRAMUSCULAR; INTRAVENOUS; SUBCUTANEOUS at 10:05

## 2018-06-27 RX ADMIN — GABAPENTIN 200 MILLIGRAM(S): 400 CAPSULE ORAL at 05:35

## 2018-06-27 RX ADMIN — HYDROMORPHONE HYDROCHLORIDE 0.5 MILLIGRAM(S): 2 INJECTION INTRAMUSCULAR; INTRAVENOUS; SUBCUTANEOUS at 14:45

## 2018-06-27 RX ADMIN — Medication 1 MILLIGRAM(S): at 13:30

## 2018-06-27 RX ADMIN — Medication 1 TABLET(S): at 05:35

## 2018-06-27 RX ADMIN — HYDROMORPHONE HYDROCHLORIDE 2 MILLIGRAM(S): 2 INJECTION INTRAMUSCULAR; INTRAVENOUS; SUBCUTANEOUS at 10:47

## 2018-06-27 RX ADMIN — MINOCYCLINE HYDROCHLORIDE 100 MILLIGRAM(S): 45 TABLET, EXTENDED RELEASE ORAL at 18:19

## 2018-06-27 RX ADMIN — HYDROMORPHONE HYDROCHLORIDE 0.5 MILLIGRAM(S): 2 INJECTION INTRAMUSCULAR; INTRAVENOUS; SUBCUTANEOUS at 21:45

## 2018-06-27 RX ADMIN — HYDROMORPHONE HYDROCHLORIDE 2 MILLIGRAM(S): 2 INJECTION INTRAMUSCULAR; INTRAVENOUS; SUBCUTANEOUS at 10:17

## 2018-06-27 RX ADMIN — BUDESONIDE AND FORMOTEROL FUMARATE DIHYDRATE 2 PUFF(S): 160; 4.5 AEROSOL RESPIRATORY (INHALATION) at 18:19

## 2018-06-27 RX ADMIN — Medication 1 TABLET(S): at 21:47

## 2018-06-27 RX ADMIN — Medication 81 MILLIGRAM(S): at 13:28

## 2018-06-27 RX ADMIN — SIMETHICONE 80 MILLIGRAM(S): 80 TABLET, CHEWABLE ORAL at 09:52

## 2018-06-27 RX ADMIN — WARFARIN SODIUM 1 MILLIGRAM(S): 2.5 TABLET ORAL at 21:46

## 2018-06-27 NOTE — PROGRESS NOTE ADULT - ASSESSMENT
ASSESSMENT AND PLAN:     76 yo F with s/p TKR with recent joint infection s/p explant and abx spacer on 12/23/17, s/p  3/23/2018 Right knee explantation of previously placed articulating antibiotic spacer, irrigation and debridement of the knee.     Dermatology has been following her for ulcers since 5/23/18.     When Pt was initially consulted in late May for R knee ulcer, the ulcer was already debrided and it was difficult to understand how the ulcer initially arose. At the time of the initial, pt already had wound VAC placed in lower abdomen and R anterior thigh. Over the course of the month, pt developed new ulcers and lesions.    Biopsies were obtained from ulcers on R medial knee on May 23 (debrided prior to biopsy) and L inner thigh on Jun 13 (new ulcer) along with tissue cultures. The biopsies showed mixed inflammatory patterns. Bacterial tissue cx's were reviewed by ID, and they were thought to be results of colonization. Mycobacteria and fungal cx are negative so far. Another tissue cx obtained from subcutaneous tissue from R lateral thigh on Jun 18 to rule out atypical mycobacteria has been negative.    Another biopsy from L wrist and L thigh (pt's daughter reported the thigh lesion appeared to be how all the ulcers started) on Jun 22 revealed urticaria and excoriation.     Through serial examinations, histological studies and tissue cultures, we ruled out neoplastic, inflammatory, vascular/vasculitic and infectious etiology of her ulcers. We'll continuously follow the tissue cultures and inform pt and her family if the results change. Of note, the AFB/fungal tissue cultures from May 23 have been negative.    Please page us with any questions.    Gilda Castillo MD  Resident Physician, PGY-3  Department of Dermatology  [facility]  Pager: [phone]  Office: [phone] ASSESSMENT AND PLAN:     78 yo F with s/p TKR with recent joint infection s/p explant and abx spacer on 12/23/17, s/p  3/23/2018 Right knee explantation of previously placed articulating antibiotic spacer, irrigation and debridement of the knee.     Dermatology has been following her for ulcers since 5/23/18.     When Pt was initially consulted in late May for R knee ulcer, the ulcer was already debrided and it was difficult to understand how the ulcer initially arose. At the time of the initial, pt already had wound VAC placed in lower abdomen and R anterior thigh. Over the course of the month, pt developed new ulcers and lesions.    Biopsies were obtained from ulcers on R medial knee on May 23 (debrided prior to biopsy) and L inner thigh on Jun 13 (new ulcer) along with tissue cultures. The biopsies showed mixed inflammatory patterns. Bacterial tissue cx's were reviewed by ID, and they were thought to be results of colonization. Mycobacteria and fungal cx are negative so far. Another tissue cx obtained from subcutaneous tissue from R lateral thigh on Jun 18 to rule out atypical mycobacteria has been negative.    Another biopsy from L wrist and L thigh (pt's daughter reported the thigh lesion appeared to be how all the ulcers started) on Jun 22 revealed urticaria and excoriation.     Through serial examinations, histological studies and tissue cultures, we ruled out neoplastic, inflammatory, vascular/vasculitic and infectious etiology of her ulcers. We'll continuously follow the tissue cultures and inform pt and her family if the results change. Of note, the AFB/fungal tissue cultures from May 23 have been negative.    The papule with a punctum on L back is a dilated pore of [place], which is a benign comedone. No intervention is needed for this lesion.    Please page us with any questions.    Gilda Castillo MD  Resident Physician, PGY-3  Department of Dermatology  [facility]  Pager: [phone]  Office: [phone] ASSESSMENT AND PLAN:     76 yo F with s/p TKR with recent joint infection s/p explant and abx spacer on 12/23/17, s/p  3/23/2018 Right knee explantation of previously placed articulating antibiotic spacer, irrigation and debridement of the knee.     Dermatology has been following her for ulcers since 5/23/18.     When Pt was initially consulted in late May for R knee ulcer, the ulcer was already debrided and it was difficult to understand how the ulcer initially arose. At the time of the initial, pt already had wound VAC placed in lower abdomen and R anterior thigh. Over the course of the month, pt developed new ulcers and lesions.    Biopsies were obtained from ulcers on R medial knee on May 23 (debrided prior to biopsy) and L inner thigh on Jun 13 (new ulcer) along with tissue cultures. The biopsies showed mixed inflammatory patterns. Bacterial tissue cx's were reviewed by ID, and they were thought to be results of colonization. Mycobacteria and fungal cx are negative so far. Another tissue cx obtained from subcutaneous tissue from R lateral thigh on Jun 18 to rule out atypical mycobacteria has been negative.    Another biopsy from L wrist and L thigh (pt's daughter reported the thigh lesion appeared to be how all the ulcers started) on Jun 22 revealed urticaria and excoriation.     Through serial examinations, histological studies and tissue cultures, we ruled out neoplastic, inflammatory, vascular/vasculitic and infectious etiology of her ulcers. We'll continuously follow the tissue cultures performed in Jun and inform pt and her family in case the results change. Of note, the AFB/fungal tissue cultures from May 23 have been negative.    The overall impression was discussed with patient and her aid at bedside.     The papule with a punctum on L back is a dilated pore of [place], which is a benign comedone. No intervention is needed for this lesion.    Please page us with any questions.    Gilda Castillo MD  Resident Physician, PGY-3  Department of Dermatology  [facility]  Pager: [phone]  Office: [phone] ASSESSMENT AND PLAN:     76 yo F with s/p TKR with recent joint infection s/p explant and abx spacer on 12/23/17, s/p  3/23/2018 Right knee explantation of previously placed articulating antibiotic spacer, irrigation and debridement of the knee.     Dermatology has been following her for ulcers since 5/23/18.     When Pt was initially consulted in late May for R knee ulcer, the ulcer was already debrided and it was difficult to understand how the ulcer initially arose. At the time of the initial, pt already had wound VAC placed in lower abdomen and R anterior thigh. Over the course of the month, pt developed new ulcers and lesions.    Biopsies were obtained from ulcers on R medial knee on May 23 (debrided prior to biopsy) and L inner thigh on Jun 13 (new ulcer) along with tissue cultures. The biopsies showed mixed inflammatory patterns. Bacterial tissue cx's were reviewed by ID, and they were thought to be results of colonization. Mycobacteria and fungal cx are negative so far. Another tissue cx obtained from subcutaneous tissue from R lateral thigh on Jun 18 to rule out atypical mycobacteria has been negative.    Another biopsy from L wrist and L thigh (pt's daughter reported the thigh lesion appeared to be how all the ulcers started) on Jun 22 revealed urticaria and excoriation.     Through serial examinations, histological studies and tissue cultures, we ruled out neoplastic, inflammatory, vascular/vasculitic and infectious etiology of her ulcers. It is also reassuring that the ulcers are healing only with wound care and wound VAC and no other intervention. We'll continuously follow the tissue cultures performed in Jun and inform pt and her family in case the results change. Of note, the AFB/fungal tissue cultures from May 23 have been negative.    The overall impression was discussed with patient and her aid at bedside.     The papule with a punctum on L back is a dilated pore of [place], which is a benign comedone. No intervention is needed for this lesion.    Please page us with any questions.    Gilda Castillo MD  Resident Physician, PGY-3  Department of Dermatology  [facility]  Pager: [phone]  Office: [phone]

## 2018-06-27 NOTE — CONSULT NOTE ADULT - SUBJECTIVE AND OBJECTIVE BOX
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    Patient was recently admitted from 4/9/18 - 6/6/18. Patient was initially presented for right total knee spacer exchange and I&D with complex wound closure. Previously had a spacer exchanged performed on 3/9 and then was discharged to rehab. Patient then returned on 4/9 with fever and concern for sepsis, found to have positive cultures from knee for MRSA as well as multifocal pneumonia. During admission, patient completed course of Daptomycin for MRSA prosthesis infection and was also treated for multifocal PNA with aztreonam. Patient had extensive wound care, followed by Surgery/Plastics/Orthopedics/Id and was discharged to rehab on 6/6 with suppressive minocycline. Admission complicated by lesion over thigh and lower pannus requiring wound vacs.  UA On 6/5 positive, but no culture sent and no antibiotics started.     Since admission patient has continued to develop wounds that will ulcer and deepen requiring wound care and wound vacs.  Patient then developed a NEW rash on 6/21-6/22.  Per the patient and her caregiver, last Thursday-Friday she developed small raised bumps that were itchy, predominantly on her bilateral wrists as well as her back.  Per her caregiver these bumps lasted for less than a day and have not recurred since that time.  One of these lesions was biopsied (on her left wrist) and was consistent with urticaria.  Another lesion on her thigh had the same histologic appearance although the family reports that that lesion did not look like hives and was more similar to how her ulcers started and not the itchy lesions on her wrist and back.  Patient reports that she does not ever get random hives at home, does not have any seasonal allergy symptoms.  Patient's daughter reports that this itchy rash developed when they changed which room she was in in the hospital last week.      Allergies    amoxicillin (Other)    Intolerances    IV Contrast (Flushing (Skin); Nausea)    MEDICATIONS  (STANDING):  ascorbic acid 500 milliGRAM(s) Oral daily  aspirin enteric coated 81 milliGRAM(s) Oral daily  buDESOnide 160 MICROgram(s)/formoterol 4.5 MICROgram(s) Inhaler 2 Puff(s) Inhalation two times a day  calcium carbonate 1250 mG + Vitamin D (OsCal 500 + D) 1 Tablet(s) Oral three times a day  folic acid 1 milliGRAM(s) Oral daily  gabapentin 200 milliGRAM(s) Oral three times a day  melatonin 3 milliGRAM(s) Oral at bedtime  metoprolol tartrate 12.5 milliGRAM(s) Oral two times a day  minocycline 100 milliGRAM(s) Oral two times a day  multivitamin 1 Tablet(s) Oral daily  ondansetron Injectable 4 milliGRAM(s) IV Push once  pantoprazole    Tablet 40 milliGRAM(s) Oral before breakfast  polyethylene glycol 3350 17 Gram(s) Oral daily  simvastatin 20 milliGRAM(s) Oral at bedtime  tiotropium 18 MICROgram(s) Capsule 1 Capsule(s) Inhalation daily  triamcinolone 0.1% Ointment 1 Application(s) Topical two times a day    MEDICATIONS  (PRN):  acetaminophen   Tablet. 650 milliGRAM(s) Oral every 6 hours PRN Mild Pain (1 - 3)  ALPRAZolam 0.25 milliGRAM(s) Oral daily PRN anxiety  bisacodyl Suppository 10 milliGRAM(s) Rectal daily PRN Constipation  bismuth subsalicylate Liquid 30 milliLiter(s) Oral every 6 hours PRN Cramping/abd pain  HYDROmorphone   Tablet 2 milliGRAM(s) Oral every 3 hours PRN Moderate Pain (4 - 6)  HYDROmorphone  Injectable 0.5 milliGRAM(s) IV Push every 4 hours PRN Severe Pain (7 - 10)  senna 2 Tablet(s) Oral at bedtime PRN Constipation  simethicone 80 milliGRAM(s) Chew two times a day PRN Gas      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  Artificial cardiac pacemaker: 12/25/17  H/O cardiac catheterization: 11/29/16 HERBERT placed on LAD  H/O endoscopy: with dilatation of esophageal stricture 2013  S/P cataract surgery: x2; 3-4yr ago  S/P gastroplasty: 28 yrs ago  S/P cholecystectomy: more than 15 years ago  S/P total knee replacement: left, 15yr ago  S/P total knee replacement: right, 12yr ago  S/P hysterectomy: 24yr ago      REVIEW OF SYSTEMS  All review of systems negative, except for those marked:  General:		  Eyes:			  ENT:			  Pulmonary:		  Cardiac:		  Gastrointestinal:	  Renal/Urologic:		  Musculoskeletal:	  Skin:		  Neurologic:		  Psychiatric:		  Endocrine:		  Hematologic:		  Allergy/Immune:	    SOCIAL/ENVIRONMENTAL HISTORY:  Family Lives:  Education Level:  Tobacco	[] No	[] Yes:  Alcohol		[] No	[] Yes:    FAMILY HISTORY:  No pertinent family history in first degree relatives    Allergies		[] No	[] Yes:   Miscarriages		[] No	[] Yes:   Autoimmune		[] No	[] Yes:   Asthma			[] No	[] Yes:  Still Births		[] No	[] Yes:  Sinusitis		[] No	[] Yes:   Fetal Demise		[] No	[] Yes:   Immune Deficiency	[] No	[] Yes:   Other:			[] No	[] Yes:    Vital Signs Last 24 Hrs  T(C): 36.6 (27 Jun 2018 11:34), Max: 37 (26 Jun 2018 19:00)  T(F): 97.9 (27 Jun 2018 11:34), Max: 98.6 (26 Jun 2018 19:00)  HR: 64 (27 Jun 2018 11:34) (61 - 64)  BP: 110/74 (27 Jun 2018 11:34) (106/57 - 130/75)  BP(mean): --  RR: 18 (27 Jun 2018 11:34) (18 - 18)  SpO2: 95% (27 Jun 2018 11:34) (95% - 99%)    PHYSICAL EXAM  All physical exam findings normal, except for those marked:  General:	alert, well developed/well nourished, no acute distress  Eyes      no conjunctival injection, no discharge, no photophobia, intact                 extraocular movements, sclera not icteric, no suborbital bogginess  ENT:    normal tympanic membranes; external ear normal, normal nasal mucosa  .		and turbinates without discharge, no pharyngeal erythema or exudates, no  .		cobblestoning, normal tongue and lips, normal tonsils   Neck    supple, full range of motion  Lymph Nodes	normal size and consistency, non-tender  Cardiovascular	regular rate and rhythm; Normal S1, S2; No murmur  Respiratory	good air movement bilaterally, no wheezing or crackles,  no retractions  Abdominal	soft; ND, NT, no hepatosplenomegaly, + bowel sounds  		normal external genitalia, no rash  Skin		skin intact and not indurated; no rash, no desquamation  Neurologic	alert, appropriate for age, cranial nerves II-XII grossly normal  Musculoskeletal	 no joint swelling, erythema, or tenderness; full range of motion  .			with no contractures; no muscle tenderness; no clubbing; no cyanosis;  .			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    Patient was recently admitted from 4/9/18 - 6/6/18. Patient was initially presented for right total knee spacer exchange and I&D with complex wound closure. Previously had a spacer exchanged performed on 3/9 and then was discharged to rehab. Patient then returned on 4/9 with fever and concern for sepsis, found to have positive cultures from knee for MRSA as well as multifocal pneumonia. During admission, patient completed course of Daptomycin for MRSA prosthesis infection and was also treated for multifocal PNA with aztreonam. Patient had extensive wound care, followed by Surgery/Plastics/Orthopedics/Id and was discharged to rehab on 6/6 with suppressive minocycline. Admission complicated by lesion over thigh and lower pannus requiring wound vacs.  UA On 6/5 positive, but no culture sent and no antibiotics started.     Since admission patient has continued to develop wounds that will ulcer and deepen requiring wound care and wound vacs.  Patient then developed a NEW rash on 6/21-6/22.  Per the patient and her caregiver, last Thursday-Friday she developed small raised bumps that were itchy, predominantly on her bilateral wrists as well as her back.  Per her caregiver these bumps lasted for less than a day and have not recurred since that time.  One of these lesions was biopsied (on her left wrist) and was consistent with urticaria.  Another lesion on her thigh had the same histologic appearance although the family reports that that lesion did not look like hives and was more similar to how her ulcers started and not the itchy lesions on her wrist and back.  Patient reports that she does not ever get random hives at home, does not have any seasonal allergy symptoms.  Patient's daughter reports that this itchy rash developed when they changed which room she was in in the hospital last week.      Allergies    amoxicillin (Other)    Intolerances    IV Contrast (Flushing (Skin); Nausea)    MEDICATIONS  (STANDING):  ascorbic acid 500 milliGRAM(s) Oral daily  aspirin enteric coated 81 milliGRAM(s) Oral daily  buDESOnide 160 MICROgram(s)/formoterol 4.5 MICROgram(s) Inhaler 2 Puff(s) Inhalation two times a day  calcium carbonate 1250 mG + Vitamin D (OsCal 500 + D) 1 Tablet(s) Oral three times a day  folic acid 1 milliGRAM(s) Oral daily  gabapentin 200 milliGRAM(s) Oral three times a day  melatonin 3 milliGRAM(s) Oral at bedtime  metoprolol tartrate 12.5 milliGRAM(s) Oral two times a day  minocycline 100 milliGRAM(s) Oral two times a day  multivitamin 1 Tablet(s) Oral daily  ondansetron Injectable 4 milliGRAM(s) IV Push once  pantoprazole    Tablet 40 milliGRAM(s) Oral before breakfast  polyethylene glycol 3350 17 Gram(s) Oral daily  simvastatin 20 milliGRAM(s) Oral at bedtime  tiotropium 18 MICROgram(s) Capsule 1 Capsule(s) Inhalation daily  triamcinolone 0.1% Ointment 1 Application(s) Topical two times a day    MEDICATIONS  (PRN):  acetaminophen   Tablet. 650 milliGRAM(s) Oral every 6 hours PRN Mild Pain (1 - 3)  ALPRAZolam 0.25 milliGRAM(s) Oral daily PRN anxiety  bisacodyl Suppository 10 milliGRAM(s) Rectal daily PRN Constipation  bismuth subsalicylate Liquid 30 milliLiter(s) Oral every 6 hours PRN Cramping/abd pain  HYDROmorphone   Tablet 2 milliGRAM(s) Oral every 3 hours PRN Moderate Pain (4 - 6)  HYDROmorphone  Injectable 0.5 milliGRAM(s) IV Push every 4 hours PRN Severe Pain (7 - 10)  senna 2 Tablet(s) Oral at bedtime PRN Constipation  simethicone 80 milliGRAM(s) Chew two times a day PRN Gas      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  Artificial cardiac pacemaker: 12/25/17  H/O cardiac catheterization: 11/29/16 HERBERT placed on LAD  H/O endoscopy: with dilatation of esophageal stricture 2013  S/P cataract surgery: x2; 3-4yr ago  S/P gastroplasty: 28 yrs ago  S/P cholecystectomy: more than 15 years ago  S/P total knee replacement: left, 15yr ago  S/P total knee replacement: right, 12yr ago  S/P hysterectomy: 24yr ago      REVIEW OF SYSTEMS  as per HPI      FAMILY HISTORY:  No pertinent family history in first degree relatives        Vital Signs Last 24 Hrs  T(C): 36.6 (27 Jun 2018 11:34), Max: 37 (26 Jun 2018 19:00)  T(F): 97.9 (27 Jun 2018 11:34), Max: 98.6 (26 Jun 2018 19:00)  HR: 64 (27 Jun 2018 11:34) (61 - 64)  BP: 110/74 (27 Jun 2018 11:34) (106/57 - 130/75)  BP(mean): --  RR: 18 (27 Jun 2018 11:34) (18 - 18)  SpO2: 95% (27 Jun 2018 11:34) (95% - 99%)    PHYSICAL EXAM  All physical exam findings normal, except for those marked:  General:	alert,  no acute distress, obese  Eyes      no conjunctival injection, no discharge  ENT:    no pharyngeal erythema or exudates, normal tongue and lips, normal tonsils   Cardiovascular	regular rate and rhythm  Respiratory	good air movement bilaterally  Abdominal	soft, NT  Skin		multiple wound vacs in place on lower extremities, scattered pustules (one on back, one on right thigh)  Neurologic	alert, appropriate for age          PT/INR - ( 27 Jun 2018 06:54 )   PT: 27.3 sec;   INR: 2.46 ratio         Skin biopsy 6/22  Final Diagnosis  1. Left thigh, punch biopsy  - Superficial perivascular lymphocytic infiltrate with  neutrophils and eosinophils and overlying acanthosis (see note).    Note: Multiple levels are examined. Sections show a perivascular  and slightly interstitial infiltrate of lymphocytes, neutrophils,  and eosinophils, consistent with urticaria. There is overlying  acanthosis with parakeratosis and focal hypergranulosis, possibly  secondary to irritation. No fungal organisms are seen with a  PAS/D stain.    2. Left arm, punch biopsy  - Sparse superficial perivascular lymphocytic infiltrate  with neutrophils and slight superficial dermal edema (see note).    Note: Multiple levels are examined. The findings are consistent  with urticaria.

## 2018-06-27 NOTE — PROGRESS NOTE ADULT - SUBJECTIVE AND OBJECTIVE BOX
SUBJECTIVE: Pt seen, chart reviewed.  Delay in documentation secondary  to [facility] EMR being down.    Pt's daughter & HHA at bedside.  Pt was premedicated.  After dressing change pt noted less pain today.  Pt overall improving otherwise- abx for appendicitis  S/p Derm bx- path noted- Derm w/o infinitive dx    No odor, redness, warmth, f/c/s noted  drainage managed by dressings	    ROS skin / msk see HPI   all other systems negative    aspirin enteric coated 81 milliGRAM(s) Oral daily  minocycline 100 milliGRAM(s) Oral two times a day      Physical Exam:  Vital Signs Last 24 Hrs  T(C): 36.8 (27 Jun 2018 21:44), Max: 36.9 (27 Jun 2018 17:23)  T(F): 98.2 (27 Jun 2018 21:44), Max: 98.5 (27 Jun 2018 17:23)  HR: 70 (27 Jun 2018 21:44) (64 - 75)  BP: 113/72 (27 Jun 2018 21:44) (110/74 - 130/79)  BP(mean): --  RR: 18 (27 Jun 2018 21:44) (18 - 18)  SpO2: 97% (27 Jun 2018 21:44) (95% - 97%)  	  General Appearance:    NAD /  A&Ox3  MO/ frail/ Disheveled   Envella    Musculoskeletal/Vascular:  BLE edema  BLE equally warm no acute ischemia noted  no deformities/ contractures  Rt knee wound per plastics    Skin:  dry, frail,  ecchymosis w/o hematoma    pt is tender when just gently touching skin- therefore pt was premedicated prior to assessment    Multiple wounds- see measurements in A&I sections- placed by wound PT    RLQ abdomen wound w/ moist & granular tissue no odor     Rt anterior thigh wound w/ moist & granular tissue w/ nonviable tissue on inferior wall of dermis  (+)serosanguinous drainage & tender  No erythema, odor, increased warmth, induration, fluctuance  Procedure Note  Using aseptic technique abdominal wall wound was excisionally debrided using a scissor and forceps through nonviable dermis wound is down to subcutaneous & adipose tissue.  Pt tolerated procedure well.  Hemostasis was maintained throughout.        Lt lateral thigh wound  w/pale moist & granular  tissue   (+)serosanguinous  drainage  (+)tender & malodor  No erythema, increased warmth, induration, fluctuance    Lt lateral posterior thigh (inferior)  Partial thickness moist granular wound  (+)serosanguinous drainage & tender  No erythema,  odor, increased warmth, induration, fluctuance    Lt  Upper medial inner thigh   moist &granular w/ fibrinous exudate  (+)serosanguinous drainage  (+)tender (+)faint malodor  No erythema, increased warmth, induration, fluctuance    Rt superior posterolateral w/ new upper thigh small wound opened w/ liquifaction necrosis drainage  wound irrigated clear w/ NS irrigation    Rt inferior posterolateral wound moist & granular tissue  (+)tender   w/o odor  (+) liquefaction necrosis drainage  No erythema, increased warmth, induration, fluctuance    Bilateral buttocks (ot over glenys prominences) healed  BLE thighs w/ small <1cm firmly attached dry eschars w/o signs of infection  No drainage  No odor, erythema, increased warmth, tenderness, induration, fluctuance    LABS:    PT/INR - ( 27 Jun 2018 06:54 )   PT: 27.3 sec;   INR: 2.46 ratio          RADIOLOGY & ADDITIONAL STUDIES:    CULTURES:  Surgical Pathology Report (06.22.18 @ 17:00)    Surgical Pathology Report:   ACCESSION No:  [id number]    IRASEMA KOHLER                     2    Surgical Final Report    Final Diagnosis  1. Left thigh, punch biopsy  - Superficial perivascular lymphocytic infiltrate with  neutrophils and eosinophils and overlying acanthosis (see note).    Note: Multiple levels are examined. Sections show a perivascular  and slightly interstitial infiltrate of lymphocytes, neutrophils,  and eosinophils, consistent with urticaria. There is overlying  acanthosis with parakeratosis and focal hypergranulosis, possibly  secondary to irritation. No fungal organisms are seen with a  PAS/D stain.    2. Left arm, punch biopsy  - Sparse superficial perivascular lymphocytic infiltrate  with neutrophils and slight superficial dermal edema (see note).    Note:Multiple levels are examined. The findings are consistent  with urticaria.    Verified by: Romain José M.D., Dermatopathologist  (Electronic Signature)  Reported on: 06/26/18 13:14 EDT,[address], [address], [place] [address]  _________________________________________________________________    Clinical History  77-year-old female with itchy skin eruption on wrist. DDX:  urticaria vs. GA  Another eruption on thigh. Reports ulcer on legs appear like this  lesion. DDX: excoriation vs urticaria    Specimen(s) Submitted  1     Left thigh  2     Left arm    Gross Description  1. The specimen is received in formalin and the specimen  container is labeled: Left thigh.  It consists of a 0.3 x 0.3 cm  diameter punch biopsy of skin taken to a depth of 0.2 cm. The  epidermis is soft, tan and smooth. The specimen is entirely  submitted in one cassette.    2. The specimen is received in formalin and the specimen  container is labeled: Left arm. It consists of a0.4 x 0.3 cm  diameter punch biopsy of skin taken to a depth of 0.2 cm. The  epidermis is soft, tan and smooth. The specimen is entirely  submitted in one cassette.      IRASEMA KOHLER                     2    Surgical Final Report    In addition to other data that may appear on the specimen  containers, all labels have been inspected to confirm the  presence of the patient's name and date of birth.    CVR 06/22/18 23:49

## 2018-06-27 NOTE — PROGRESS NOTE ADULT - SUBJECTIVE AND OBJECTIVE BOX
INTERVAL HPI/OVERNIGHT EVENTS:  No new lesions. Areas dressed with wound VAC are healing.    MEDICATIONS  (STANDING):  ascorbic acid 500 milliGRAM(s) Oral daily  aspirin enteric coated 81 milliGRAM(s) Oral daily  betamethasone valerate 0.1% Cream 1 Application(s) Topical two times a day  buDESOnide 160 MICROgram(s)/formoterol 4.5 MICROgram(s) Inhaler 2 Puff(s) Inhalation two times a day  calcium carbonate 1250 mG + Vitamin D (OsCal 500 + D) 1 Tablet(s) Oral three times a day  folic acid 1 milliGRAM(s) Oral daily  gabapentin 200 milliGRAM(s) Oral three times a day  loratadine 10 milliGRAM(s) Oral daily  melatonin 3 milliGRAM(s) Oral at bedtime  metoprolol tartrate 12.5 milliGRAM(s) Oral two times a day  minocycline 100 milliGRAM(s) Oral two times a day  multivitamin 1 Tablet(s) Oral daily  ondansetron Injectable 4 milliGRAM(s) IV Push once  pantoprazole    Tablet 40 milliGRAM(s) Oral before breakfast  polyethylene glycol 3350 17 Gram(s) Oral daily  simvastatin 20 milliGRAM(s) Oral at bedtime  tiotropium 18 MICROgram(s) Capsule 1 Capsule(s) Inhalation daily    MEDICATIONS  (PRN):  acetaminophen   Tablet. 650 milliGRAM(s) Oral every 6 hours PRN Mild Pain (1 - 3)  ALPRAZolam 0.25 milliGRAM(s) Oral daily PRN anxiety  bisacodyl Suppository 10 milliGRAM(s) Rectal daily PRN Constipation  bismuth subsalicylate Liquid 30 milliLiter(s) Oral every 6 hours PRN Cramping/abd pain  HYDROmorphone   Tablet 2 milliGRAM(s) Oral every 3 hours PRN Moderate Pain (4 - 6)  HYDROmorphone  Injectable 0.5 milliGRAM(s) IV Push every 4 hours PRN Severe Pain (7 - 10)  senna 2 Tablet(s) Oral at bedtime PRN Constipation  simethicone 80 milliGRAM(s) Chew two times a day PRN Gas      Allergies    amoxicillin (Other)    Intolerances    IV Contrast (Flushing (Skin); Nausea)      REVIEW OF SYSTEMS      General: no fevers/chills, no NS	    Skin: see HPI  	  Ophthalmologic: no eye pain or change in vision    Genitourinary: no dysuria or hematuria    Musculoskeletal: no joint pains or weakness	    Neurological:no weakness or tingling          Vital Signs Last 24 Hrs  T(C): 36.8 (27 Jun 2018 21:44), Max: 36.9 (27 Jun 2018 17:23)  T(F): 98.2 (27 Jun 2018 21:44), Max: 98.5 (27 Jun 2018 17:23)  HR: 70 (27 Jun 2018 21:44) (64 - 75)  BP: 113/72 (27 Jun 2018 21:44) (110/74 - 130/79)  BP(mean): --  RR: 18 (27 Jun 2018 21:44) (18 - 18)  SpO2: 97% (27 Jun 2018 21:44) (95% - 97%)    PHYSICAL EXAM:   The patient was alert and oriented X 3, well nourished, and in no  apparent distress.  There was no visible lymphadenopathy.  Conjunctiva were non injected  There was no clubbing or edema of extremities.  There was no hyperhidrosis or bromhidrosis.    Of note on skin exam:   Wound VAC in place on lower abd, R anterior and posterior thigh and R medial knee  No lesions on wrists  2 ulcers on L inner thigh      LABS:          PT/INR - ( 27 Jun 2018 06:54 )   PT: 27.3 sec;   INR: 2.46 ratio INTERVAL HPI/OVERNIGHT EVENTS:  No new lesions. Areas dressed with wound VAC are healing.    MEDICATIONS  (STANDING):  ascorbic acid 500 milliGRAM(s) Oral daily  aspirin enteric coated 81 milliGRAM(s) Oral daily  betamethasone valerate 0.1% Cream 1 Application(s) Topical two times a day  buDESOnide 160 MICROgram(s)/formoterol 4.5 MICROgram(s) Inhaler 2 Puff(s) Inhalation two times a day  calcium carbonate 1250 mG + Vitamin D (OsCal 500 + D) 1 Tablet(s) Oral three times a day  folic acid 1 milliGRAM(s) Oral daily  gabapentin 200 milliGRAM(s) Oral three times a day  loratadine 10 milliGRAM(s) Oral daily  melatonin 3 milliGRAM(s) Oral at bedtime  metoprolol tartrate 12.5 milliGRAM(s) Oral two times a day  minocycline 100 milliGRAM(s) Oral two times a day  multivitamin 1 Tablet(s) Oral daily  ondansetron Injectable 4 milliGRAM(s) IV Push once  pantoprazole    Tablet 40 milliGRAM(s) Oral before breakfast  polyethylene glycol 3350 17 Gram(s) Oral daily  simvastatin 20 milliGRAM(s) Oral at bedtime  tiotropium 18 MICROgram(s) Capsule 1 Capsule(s) Inhalation daily    MEDICATIONS  (PRN):  acetaminophen   Tablet. 650 milliGRAM(s) Oral every 6 hours PRN Mild Pain (1 - 3)  ALPRAZolam 0.25 milliGRAM(s) Oral daily PRN anxiety  bisacodyl Suppository 10 milliGRAM(s) Rectal daily PRN Constipation  bismuth subsalicylate Liquid 30 milliLiter(s) Oral every 6 hours PRN Cramping/abd pain  HYDROmorphone   Tablet 2 milliGRAM(s) Oral every 3 hours PRN Moderate Pain (4 - 6)  HYDROmorphone  Injectable 0.5 milliGRAM(s) IV Push every 4 hours PRN Severe Pain (7 - 10)  senna 2 Tablet(s) Oral at bedtime PRN Constipation  simethicone 80 milliGRAM(s) Chew two times a day PRN Gas      Allergies    amoxicillin (Other)    Intolerances    IV Contrast (Flushing (Skin); Nausea)      REVIEW OF SYSTEMS      General: no fevers/chills, no NS	    Skin: see HPI  	  Ophthalmologic: no eye pain or change in vision    Genitourinary: no dysuria or hematuria    Musculoskeletal: no joint pains or weakness	    Neurological:no weakness or tingling          Vital Signs Last 24 Hrs  T(C): 36.8 (27 Jun 2018 21:44), Max: 36.9 (27 Jun 2018 17:23)  T(F): 98.2 (27 Jun 2018 21:44), Max: 98.5 (27 Jun 2018 17:23)  HR: 70 (27 Jun 2018 21:44) (64 - 75)  BP: 113/72 (27 Jun 2018 21:44) (110/74 - 130/79)  BP(mean): --  RR: 18 (27 Jun 2018 21:44) (18 - 18)  SpO2: 97% (27 Jun 2018 21:44) (95% - 97%)    PHYSICAL EXAM:   The patient was alert and oriented X 3, well nourished, and in no  apparent distress.  There was no visible lymphadenopathy.  Conjunctiva were non injected  There was no clubbing or edema of extremities.  There was no hyperhidrosis or bromhidrosis.    Of note on skin exam:   Wound VAC in place on lower abd, R anterior and posterior thigh and R medial knee  No lesions on wrists  2 ulcers on L inner thigh  skin-colored papule with a punctum on L back      LABS:          PT/INR - ( 27 Jun 2018 06:54 )   PT: 27.3 sec;   INR: 2.46 ratio

## 2018-06-27 NOTE — PROGRESS NOTE ADULT - ASSESSMENT
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 with recent admission from (3/9/2018 to 6/6/2018) for TKR c/b joint infection s/p explantation with multiple wounds managed by plastic surgery who presents from rehab today for evaluation of fever, lethargy, and nausea at rehab found to be febrile here with evidence of appendicitis on CT abdomen, non-operative candidate,     # acute appendicitis  Completed 10d course Meropenem, resolved    # abd wound, multiple bl leg wounds  continue vac to abdomen, rt posterior thigh and rt Knee  vac changes M/W/F  appreciate wound care recs, orders written per wound care  cont NS irrigation, cavilon, medihoney  pain control  appreciate derm recs, 6/22 left thigh and left arm punch biopsies resulted and show perivascular and slightly interstitial infiltrate of lymphocytes, neutrophils, and eosinophils, consistent with urticaria.  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 with recent admission from (3/9/2018 to 6/6/2018) for TKR c/b joint infection s/p explantation with multiple wounds managed by plastic surgery who presents from rehab today for evaluation of fever, lethargy, and nausea at rehab found to be febrile here with evidence of appendicitis on CT abdomen, non-operative candidate,     # acute appendicitis  Completed 10d course Meropenem, resolved    # abd wound, multiple bl leg wounds  continue vac to abdomen, rt posterior thigh and rt Knee  vac changes M/W/F  appreciate wound care recs, orders written per wound care  cont NS irrigation, cavilon, medihoney  pain control  appreciate derm recs, 6/22 left thigh and left arm punch biopsies resulted and show perivascular and slightly interstitial infiltrate of lymphocytes, neutrophils, and eosinophils, consistent with urticaria.  tissue culture grew coag neg staph which can be normal roopa    #TKR wound-mrsa-chr mionocycline    # Pain control  dilaudid2 po q4 prn, 0.5 iv prior to dressing change  bowel regimen    # chr Anemia  stable, monitor h/h closely    # S/P TAVR (transcatheter aortic valve replacement)  continue Coumadin, daily INR    # adjustment disorder with depressed mood  appreciate psych re eval  xanax prn    plan of care dw patient   discussed biopsy results with daughter and patient, they both do not believe ulcers are from scratching. will monitor for further development of ulcers. 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 with recent admission from (3/9/2018 to 6/6/2018) for TKR c/b joint infection s/p explantation with multiple wounds managed by plastic surgery who presents from rehab today for evaluation of fever, lethargy, and nausea at rehab found to be febrile here with evidence of appendicitis on CT abdomen, non-operative candidate,     # acute appendicitis  Completed 10d course Meropenem, resolved    # abd wound, multiple bl leg wounds  continue vac to abdomen, rt posterior thigh and rt Knee, vac changes M/W/F  appreciate wound care recs  cont NS irrigation, cavilon, medihoney  pain control  appreciate derm recs, 6/22 left thigh and left arm punch biopsies resulted and show perivascular and slightly interstitial infiltrate of lymphocytes, neutrophils, and eosinophils, consistent with urticaria.  patient with new lesion on posterior left torso, will ask derm to eval  allergy consult as pathology showing urticaria and pt with continues to develop multiple lesions that lead to ulcers  tissue culture grew coag neg staph which can be normal roopa    #TKR wound-mrsa-chr mionocycline    # Pain control  dilaudid2 po q4 prn, 0.5 iv prior to dressing change  bowel regimen    # chr Anemia  stable, monitor h/h closely    # S/P TAVR (transcatheter aortic valve replacement)  continue Coumadin, daily INR    # adjustment disorder with depressed mood  appreciate psych re eval  xanax prn    plan of care dw patient   discussed biopsy results with daughter and patient, they both do not believe ulcers are from  excoriation. will monitor for further development of lesions/ulcers.

## 2018-06-27 NOTE — PROGRESS NOTE ADULT - SUBJECTIVE AND OBJECTIVE BOX
Patient is a 77y old  Female who presents with a chief complaint of fever, lethargy (07 Jun 2018 22:19)      SUBJECTIVE / OVERNIGHT EVENTS:    Patient seen and examined.       Vital Signs Last 24 Hrs  T(C): 37 (26 Jun 2018 19:00), Max: 37 (26 Jun 2018 19:00)  T(F): 98.6 (26 Jun 2018 19:00), Max: 98.6 (26 Jun 2018 19:00)  HR: 61 (26 Jun 2018 19:00) (61 - 64)  BP: 130/75 (26 Jun 2018 19:00) (106/57 - 130/75)  BP(mean): --  RR: 18 (26 Jun 2018 19:00) (18 - 18)  SpO2: 95% (26 Jun 2018 19:00) (95% - 99%)  I&O's Summary    26 Jun 2018 07:01  -  27 Jun 2018 07:00  --------------------------------------------------------  IN: 480 mL / OUT: 100 mL / NET: 380 mL        PE:  GENERAL: NAD, AAOx3  HEAD:  Atraumatic, Normocephalic  EYES: EOMI, PERRLA, conjunctiva and sclera clear  NECK: Supple, No JVD  CHEST/LUNG: CTABL, No wheeze  HEART: Regular rate and rhythm; + murmur  ABDOMEN: Soft, Nontender, Nondistended; Bowel sounds present  EXTREMITIES:  2+ Peripheral Pulses, No clubbing, cyanosis, or edema  SKIN: No rashes or lesions  NEURO: No focal deficits    LABS:          PT/INR - ( 27 Jun 2018 06:54 )   PT: 27.3 sec;   INR: 2.46 ratio           CAPILLARY BLOOD GLUCOSE                RADIOLOGY & ADDITIONAL TESTS:    Imaging Personally Reviewed:  [x] YES  [ ] NO    Consultant(s) Notes Reviewed:  [x] YES  [ ] NO    MEDICATIONS  (STANDING):  ascorbic acid 500 milliGRAM(s) Oral daily  aspirin enteric coated 81 milliGRAM(s) Oral daily  buDESOnide 160 MICROgram(s)/formoterol 4.5 MICROgram(s) Inhaler 2 Puff(s) Inhalation two times a day  calcium carbonate 1250 mG + Vitamin D (OsCal 500 + D) 1 Tablet(s) Oral three times a day  folic acid 1 milliGRAM(s) Oral daily  gabapentin 200 milliGRAM(s) Oral three times a day  melatonin 3 milliGRAM(s) Oral at bedtime  metoprolol tartrate 12.5 milliGRAM(s) Oral two times a day  minocycline 100 milliGRAM(s) Oral two times a day  multivitamin 1 Tablet(s) Oral daily  ondansetron Injectable 4 milliGRAM(s) IV Push once  pantoprazole    Tablet 40 milliGRAM(s) Oral before breakfast  polyethylene glycol 3350 17 Gram(s) Oral daily  simvastatin 20 milliGRAM(s) Oral at bedtime  tiotropium 18 MICROgram(s) Capsule 1 Capsule(s) Inhalation daily  triamcinolone 0.1% Ointment 1 Application(s) Topical two times a day    MEDICATIONS  (PRN):  acetaminophen   Tablet. 650 milliGRAM(s) Oral every 6 hours PRN Mild Pain (1 - 3)  ALPRAZolam 0.25 milliGRAM(s) Oral daily PRN anxiety  bisacodyl Suppository 10 milliGRAM(s) Rectal daily PRN Constipation  bismuth subsalicylate Liquid 30 milliLiter(s) Oral every 6 hours PRN Cramping/abd pain  HYDROmorphone   Tablet 2 milliGRAM(s) Oral every 3 hours PRN Moderate Pain (4 - 6)  HYDROmorphone  Injectable 0.5 milliGRAM(s) IV Push every 4 hours PRN Severe Pain (7 - 10)  senna 2 Tablet(s) Oral at bedtime PRN Constipation      Care Discussed with Consultants/Other Providers [x] YES  [ ] NO    HEALTH ISSUES - PROBLEM Dx:  Joint infection: Joint infection  Other problems related to medical facilities and other health care: Other problems related to medical facilities and other health care  S/P TAVR (transcatheter aortic valve replacement): S/P TAVR (transcatheter aortic valve replacement)  Acute cystitis without hematuria: Acute cystitis without hematuria  Wound infection: Wound infection  Acute appendicitis, unspecified acute appendicitis type: Acute appendicitis, unspecified acute appendicitis type  Fever, unspecified fever cause: Fever, unspecified fever cause Patient is a 77y old  Female who presents with a chief complaint of fever, lethargy (07 Jun 2018 22:19)      SUBJECTIVE / OVERNIGHT EVENTS:    Patient seen and examined. co upset stomach. multiple formed bowel movements. co gas and bloating.       Vital Signs Last 24 Hrs  T(C): 37 (26 Jun 2018 19:00), Max: 37 (26 Jun 2018 19:00)  T(F): 98.6 (26 Jun 2018 19:00), Max: 98.6 (26 Jun 2018 19:00)  HR: 61 (26 Jun 2018 19:00) (61 - 64)  BP: 130/75 (26 Jun 2018 19:00) (106/57 - 130/75)  BP(mean): --  RR: 18 (26 Jun 2018 19:00) (18 - 18)  SpO2: 95% (26 Jun 2018 19:00) (95% - 99%)  I&O's Summary    26 Jun 2018 07:01  -  27 Jun 2018 07:00  --------------------------------------------------------  IN: 480 mL / OUT: 100 mL / NET: 380 mL        PE:  GENERAL: NAD, AAOx3, obese  HEAD:  Atraumatic, Normocephalic  EYES:  conjunctiva and sclera clear  NECK: Supple, No JVD  CHEST/LUNG: CTABL, No wheeze  HEART: Regular rate and rhythm; no murmur  ABDOMEN: Soft, Nontender, Nondistended; Bowel sounds present, abd vac  EXTREMITIES:  2+ Peripheral Pulses, right thigh and right knee vac, right lateral posterior thigh vac  SKIN: No rashes or lesions  NEURO: No focal deficits    LABS:          PT/INR - ( 27 Jun 2018 06:54 )   PT: 27.3 sec;   INR: 2.46 ratio           CAPILLARY BLOOD GLUCOSE                RADIOLOGY & ADDITIONAL TESTS:    Imaging Personally Reviewed:  [x] YES  [ ] NO    Consultant(s) Notes Reviewed:  [x] YES  [ ] NO    MEDICATIONS  (STANDING):  ascorbic acid 500 milliGRAM(s) Oral daily  aspirin enteric coated 81 milliGRAM(s) Oral daily  buDESOnide 160 MICROgram(s)/formoterol 4.5 MICROgram(s) Inhaler 2 Puff(s) Inhalation two times a day  calcium carbonate 1250 mG + Vitamin D (OsCal 500 + D) 1 Tablet(s) Oral three times a day  folic acid 1 milliGRAM(s) Oral daily  gabapentin 200 milliGRAM(s) Oral three times a day  melatonin 3 milliGRAM(s) Oral at bedtime  metoprolol tartrate 12.5 milliGRAM(s) Oral two times a day  minocycline 100 milliGRAM(s) Oral two times a day  multivitamin 1 Tablet(s) Oral daily  ondansetron Injectable 4 milliGRAM(s) IV Push once  pantoprazole    Tablet 40 milliGRAM(s) Oral before breakfast  polyethylene glycol 3350 17 Gram(s) Oral daily  simvastatin 20 milliGRAM(s) Oral at bedtime  tiotropium 18 MICROgram(s) Capsule 1 Capsule(s) Inhalation daily  triamcinolone 0.1% Ointment 1 Application(s) Topical two times a day    MEDICATIONS  (PRN):  acetaminophen   Tablet. 650 milliGRAM(s) Oral every 6 hours PRN Mild Pain (1 - 3)  ALPRAZolam 0.25 milliGRAM(s) Oral daily PRN anxiety  bisacodyl Suppository 10 milliGRAM(s) Rectal daily PRN Constipation  bismuth subsalicylate Liquid 30 milliLiter(s) Oral every 6 hours PRN Cramping/abd pain  HYDROmorphone   Tablet 2 milliGRAM(s) Oral every 3 hours PRN Moderate Pain (4 - 6)  HYDROmorphone  Injectable 0.5 milliGRAM(s) IV Push every 4 hours PRN Severe Pain (7 - 10)  senna 2 Tablet(s) Oral at bedtime PRN Constipation      Care Discussed with Consultants/Other Providers [x] YES  [ ] NO    HEALTH ISSUES - PROBLEM Dx:  Joint infection: Joint infection  Other problems related to medical facilities and other health care: Other problems related to medical facilities and other health care  S/P TAVR (transcatheter aortic valve replacement): S/P TAVR (transcatheter aortic valve replacement)  Acute cystitis without hematuria: Acute cystitis without hematuria  Wound infection: Wound infection  Acute appendicitis, unspecified acute appendicitis type: Acute appendicitis, unspecified acute appendicitis type  Fever, unspecified fever cause: Fever, unspecified fever cause

## 2018-06-27 NOTE — PROGRESS NOTE ADULT - SUBJECTIVE AND OBJECTIVE BOX
Plastic Surgery Progress Note (pg [facility]: [id number], [facility]: [phone])    SUBJECTIVE:  The patient was seen and examined this morning. No acute events overnight. Complains of discomfort with the dressing changes, expressed frustration about having so many wounds. Was reassured. Per derm note, skin biopsies from other sites showed urticaria and excoriation.    OBJECTIVE:     ** VITAL SIGNS / I&O's **    Vital Signs Last 24 Hrs  T(C): 37 (26 Jun 2018 19:00), Max: 37 (26 Jun 2018 19:00)  T(F): 98.6 (26 Jun 2018 19:00), Max: 98.6 (26 Jun 2018 19:00)  HR: 61 (26 Jun 2018 19:00) (61 - 64)  BP: 130/75 (26 Jun 2018 19:00) (106/57 - 130/75)  BP(mean): --  RR: 18 (26 Jun 2018 19:00) (18 - 18)  SpO2: 95% (26 Jun 2018 19:00) (95% - 99%)      26 Jun 2018 07:01  -  27 Jun 2018 07:00  --------------------------------------------------------  IN:    Oral Fluid: 480 mL  Total IN: 480 mL    OUT:    Voided: 100 mL  Total OUT: 100 mL    Total NET: 380 mL          ** PHYSICAL EXAM **    -- CONSTITUTIONAL: Alert, NAD  -- ABD: central abd VAC intact set to suction  -- Ext: R thigh VAC intact holding suction, Right knee instill VAC in place with straw-colored fluid in canister    ** LABS **            PT/INR - ( 27 Jun 2018 06:54 )   PT: 27.3 sec;   INR: 2.46 ratio           CAPILLARY BLOOD GLUCOSE                    ** MEDICATIONS **  MEDICATIONS  (STANDING):  ascorbic acid 500 milliGRAM(s) Oral daily  aspirin enteric coated 81 milliGRAM(s) Oral daily  buDESOnide 160 MICROgram(s)/formoterol 4.5 MICROgram(s) Inhaler 2 Puff(s) Inhalation two times a day  calcium carbonate 1250 mG + Vitamin D (OsCal 500 + D) 1 Tablet(s) Oral three times a day  folic acid 1 milliGRAM(s) Oral daily  gabapentin 200 milliGRAM(s) Oral three times a day  melatonin 3 milliGRAM(s) Oral at bedtime  metoprolol tartrate 12.5 milliGRAM(s) Oral two times a day  minocycline 100 milliGRAM(s) Oral two times a day  multivitamin 1 Tablet(s) Oral daily  ondansetron Injectable 4 milliGRAM(s) IV Push once  pantoprazole    Tablet 40 milliGRAM(s) Oral before breakfast  polyethylene glycol 3350 17 Gram(s) Oral daily  simvastatin 20 milliGRAM(s) Oral at bedtime  tiotropium 18 MICROgram(s) Capsule 1 Capsule(s) Inhalation daily  triamcinolone 0.1% Ointment 1 Application(s) Topical two times a day    MEDICATIONS  (PRN):  acetaminophen   Tablet. 650 milliGRAM(s) Oral every 6 hours PRN Mild Pain (1 - 3)  ALPRAZolam 0.25 milliGRAM(s) Oral daily PRN anxiety  bisacodyl Suppository 10 milliGRAM(s) Rectal daily PRN Constipation  bismuth subsalicylate Liquid 30 milliLiter(s) Oral every 6 hours PRN Cramping/abd pain  HYDROmorphone   Tablet 2 milliGRAM(s) Oral every 3 hours PRN Moderate Pain (4 - 6)  HYDROmorphone  Injectable 0.5 milliGRAM(s) IV Push every 4 hours PRN Severe Pain (7 - 10)  senna 2 Tablet(s) Oral at bedtime PRN Constipation Plastic Surgery Progress Note (pg [facility]: [id number], [facility]: [phone])    SUBJECTIVE:  The patient was seen and examined this morning. No acute events overnight. Complains of discomfort with the dressing changes, expressed frustration about having so many wounds. Was reassured. Per derm note, skin biopsies from other sites showed urticaria and excoriation. Afebrile, all vital signs stable.    OBJECTIVE:     ** VITAL SIGNS / I&O's **    Vital Signs Last 24 Hrs  T(C): 37 (26 Jun 2018 19:00), Max: 37 (26 Jun 2018 19:00)  T(F): 98.6 (26 Jun 2018 19:00), Max: 98.6 (26 Jun 2018 19:00)  HR: 61 (26 Jun 2018 19:00) (61 - 64)  BP: 130/75 (26 Jun 2018 19:00) (106/57 - 130/75)  BP(mean): --  RR: 18 (26 Jun 2018 19:00) (18 - 18)  SpO2: 95% (26 Jun 2018 19:00) (95% - 99%)      26 Jun 2018 07:01  -  27 Jun 2018 07:00  --------------------------------------------------------  IN:    Oral Fluid: 480 mL  Total IN: 480 mL    OUT:    Voided: 100 mL  Total OUT: 100 mL    Total NET: 380 mL          ** PHYSICAL EXAM **    -- CONSTITUTIONAL: Alert, NAD  -- ABD: central abd VAC intact set to suction  -- Ext: R thigh VAC intact holding suction, Right knee instill VAC w/ grey foam in place with straw-colored fluid in canister    ** LABS **            PT/INR - ( 27 Jun 2018 06:54 )   PT: 27.3 sec;   INR: 2.46 ratio           CAPILLARY BLOOD GLUCOSE                    ** MEDICATIONS **  MEDICATIONS  (STANDING):  ascorbic acid 500 milliGRAM(s) Oral daily  aspirin enteric coated 81 milliGRAM(s) Oral daily  buDESOnide 160 MICROgram(s)/formoterol 4.5 MICROgram(s) Inhaler 2 Puff(s) Inhalation two times a day  calcium carbonate 1250 mG + Vitamin D (OsCal 500 + D) 1 Tablet(s) Oral three times a day  folic acid 1 milliGRAM(s) Oral daily  gabapentin 200 milliGRAM(s) Oral three times a day  melatonin 3 milliGRAM(s) Oral at bedtime  metoprolol tartrate 12.5 milliGRAM(s) Oral two times a day  minocycline 100 milliGRAM(s) Oral two times a day  multivitamin 1 Tablet(s) Oral daily  ondansetron Injectable 4 milliGRAM(s) IV Push once  pantoprazole    Tablet 40 milliGRAM(s) Oral before breakfast  polyethylene glycol 3350 17 Gram(s) Oral daily  simvastatin 20 milliGRAM(s) Oral at bedtime  tiotropium 18 MICROgram(s) Capsule 1 Capsule(s) Inhalation daily  triamcinolone 0.1% Ointment 1 Application(s) Topical two times a day    MEDICATIONS  (PRN):  acetaminophen   Tablet. 650 milliGRAM(s) Oral every 6 hours PRN Mild Pain (1 - 3)  ALPRAZolam 0.25 milliGRAM(s) Oral daily PRN anxiety  bisacodyl Suppository 10 milliGRAM(s) Rectal daily PRN Constipation  bismuth subsalicylate Liquid 30 milliLiter(s) Oral every 6 hours PRN Cramping/abd pain  HYDROmorphone   Tablet 2 milliGRAM(s) Oral every 3 hours PRN Moderate Pain (4 - 6)  HYDROmorphone  Injectable 0.5 milliGRAM(s) IV Push every 4 hours PRN Severe Pain (7 - 10)  senna 2 Tablet(s) Oral at bedtime PRN Constipation

## 2018-06-27 NOTE — PROGRESS NOTE ADULT - ASSESSMENT
A/P: s/p Right total knee insertion of spacer, irrigation and debridement, complex wound closure 3/23; readmitted 6/8 for appendicitis  - cont vac change to R thigh and abdomen M/W/F  - continue instill VAC therapy to R knee  - remainder of wounds per wound care/derm  - will cont to follow    Plastic Surgery (pg TOYA: [id number], NS: [phone]) A/P: s/p Right total knee insertion of spacer, irrigation and debridement, complex wound closure 3/23; readmitted 6/8 for appendicitis  - cont vac change to R thigh and abdomen M/W/F  - continue instill VAC therapy to R knee  - will attempt to evaluate wounds with PT at VAC change today  - remainder of wounds per wound care/derm  - will cont to follow    Plastic Surgery (pg TOYA: [id number], NS: [phone])

## 2018-06-27 NOTE — CONSULT NOTE ADULT - ASSESSMENT
77 year old female with complicated medical history who now has multiple wounds requiring wound vacs and concern for urticaria.     Patient's rash last Friday appears to be consistent with urticaria as evidenced by the skin biopsy.  These appears to have been an isolated event and may not be related to the rest of her wounds.  The fact that the patient only had hives (per patient and caregivers) for 1-2 days last week makes it unlikely that they were due to environmental factors as you would expect there to be continued symptoms in the same environment.  In addition, it is unlikely secondary to any medications as she did not received any new meds that day.    In order to prevent any recurrence of hives, we can offer the patient a long acting antihistamine such as Allegra 180mg (must be ordered off formulary from pharmacy).  Must bear in mind that antihistamines can be sedating in elderly patients and must weigh risk benefits in combination with her other medications and discussion with the family.   I discussed with the family that if the patient develops more chronic urticaria (defined as lasting for more than 6 weeks) we can pursue a work up for an etiology leading to these hives.  Recommend that family keep a log of when these hives occur as they appear to be different than the indurated areas that begin as a precursor to her more severe wounds.  Further allergy testing can be pursued as an outpatient as well. 77 year old female with complicated medical history who now has multiple wounds requiring wound vacs and concern for urticaria.     Patient's rash last Friday appears to be consistent with urticaria as evidenced by the skin biopsy.  These appears to have been an isolated event and may not be related to the rest of her wounds.  The fact that the patient only had hives (per patient and caregivers) for 1-2 days last week makes it unlikely that they were due to environmental factors as you would expect there to be continued symptoms in the same environment.  In addition, it is unlikely secondary to any medications as she did not received any new meds that day.    In order to prevent any recurrence of hives, we can offer the patient a long acting antihistamine such as Allegra 180mg (must be ordered off formulary from pharmacy).  Must bear in mind that antihistamines can be sedating in elderly patients and must weigh risk benefits in combination with her other medications and discussion with the family.  It is unclear whether these hives are the beginning lesions that develop into the severe wounds, however minimizing the itch with a long acting antihistamine will hopefully prevent any scratching and the introduction of more bacteria into the patient's skin.   I discussed with the family that if the patient develops more chronic urticaria (defined as lasting for more than 6 weeks) we can pursue a work up for an etiology leading to these hives.  Recommend that family keep a log of when these hives occur as they appear to be different than the indurated areas that begin as a precursor to her more severe wounds.  Further allergy testing can be pursued as an outpatient as well.

## 2018-06-27 NOTE — CONSULT NOTE ADULT - PROBLEM SELECTOR RECOMMENDATION 9
Can manage symptomatically with long acting antihistamine such as Allegra. Can manage symptomatically with long acting antihistamine such as Allegra. These are preferred over more sedating antihistamines such as Benadryl in elderly population.   Please keep track of when patient develops itchy rash so we can determine if she has recurrent hives or any obvious triggers for the hives as there was no clear trigger last week.     Family expressed understanding of this plan, they feel adamant that the hives are not the cause of her overall wound issues so it is likely that she has TWO processes at play here, an episode of acute urticaria that occurred 6/216/22 and her underlying wound infectious problem that seems to be ongoing.     Please call us with any questions.  [phone] Can manage symptomatically with long acting antihistamine such as Allegra. These are preferred over more sedating antihistamines such as Benadryl in elderly population.   Please keep track of when patient develops itchy rash so we can determine if she has recurrent hives or any obvious triggers for the hives as there was no clear trigger last week.     Family expressed understanding of this plan, they feel adamant that the hives are not the cause of her overall wound issues so it is likely that she has TWO processes at play here, an episode of acute urticaria that occurred 6/21-6/22 and her underlying wound infectious problem that seems to be ongoing.     Please call us with any questions.  [phone]

## 2018-06-27 NOTE — PROGRESS NOTE ADULT - ASSESSMENT
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    Multiple wounds Abdomen Bilateral thighs w/ new wounds on buttocks- no gross signs of infection    VAC to RLQ abdominal wound and Rt anterior thigh & Lt lateral thigh wounds  Multiple smaller wounds- Medihoney or Aquacel dressings- dressing order placed    Rt knee w/ skin dehiscence - per plastics and ortho  Derm  & Pain mngt consult appreciated  no dx yet - unexplained wounds-  Abx per Medicine/ ID  Gen SUrg/ Plastics/ Ortho consults appreciated  Order placed for ENvella by darshana- awaiting arrival  con't offloading  con't Nuturition  con't Pericare  Con't per Medicine  F/u as outpatient in [facility] [address] [facility] [phone]  d/w Medicine team & d/w attending  Janki Cramer PA-C [id number]  I spent  35 minutes w/ this pt of which more than 50% of the time was spent counseling & coordinating care of this pt.

## 2018-06-28 ENCOUNTER — CHART COPY (OUTPATIENT)
Age: 78
End: 2018-06-28

## 2018-06-28 ENCOUNTER — APPOINTMENT (OUTPATIENT)
Dept: ELECTROPHYSIOLOGY | Facility: CLINIC | Age: 78
End: 2018-06-28

## 2018-06-28 LAB
INR BLD: 2.24 RATIO — HIGH (ref 0.88–1.16)
PROTHROM AB SERPL-ACNC: 24.6 SEC — HIGH (ref 9.8–12.7)

## 2018-06-28 PROCEDURE — 99223 1ST HOSP IP/OBS HIGH 75: CPT | Mod: GC

## 2018-06-28 PROCEDURE — 99232 SBSQ HOSP IP/OBS MODERATE 35: CPT

## 2018-06-28 RX ORDER — WARFARIN SODIUM 2.5 MG/1
1 TABLET ORAL ONCE
Qty: 0 | Refills: 0 | Status: COMPLETED | OUTPATIENT
Start: 2018-06-28 | End: 2018-06-28

## 2018-06-28 RX ADMIN — HYDROMORPHONE HYDROCHLORIDE 0.5 MILLIGRAM(S): 2 INJECTION INTRAMUSCULAR; INTRAVENOUS; SUBCUTANEOUS at 22:26

## 2018-06-28 RX ADMIN — SIMVASTATIN 20 MILLIGRAM(S): 20 TABLET, FILM COATED ORAL at 21:37

## 2018-06-28 RX ADMIN — HYDROMORPHONE HYDROCHLORIDE 0.5 MILLIGRAM(S): 2 INJECTION INTRAMUSCULAR; INTRAVENOUS; SUBCUTANEOUS at 06:38

## 2018-06-28 RX ADMIN — Medication 1 TABLET(S): at 13:45

## 2018-06-28 RX ADMIN — Medication 81 MILLIGRAM(S): at 13:45

## 2018-06-28 RX ADMIN — Medication 12.5 MILLIGRAM(S): at 06:09

## 2018-06-28 RX ADMIN — Medication 3 MILLIGRAM(S): at 21:37

## 2018-06-28 RX ADMIN — HYDROMORPHONE HYDROCHLORIDE 0.5 MILLIGRAM(S): 2 INJECTION INTRAMUSCULAR; INTRAVENOUS; SUBCUTANEOUS at 10:52

## 2018-06-28 RX ADMIN — HYDROMORPHONE HYDROCHLORIDE 2 MILLIGRAM(S): 2 INJECTION INTRAMUSCULAR; INTRAVENOUS; SUBCUTANEOUS at 11:38

## 2018-06-28 RX ADMIN — TIOTROPIUM BROMIDE 1 CAPSULE(S): 18 CAPSULE ORAL; RESPIRATORY (INHALATION) at 13:44

## 2018-06-28 RX ADMIN — HYDROMORPHONE HYDROCHLORIDE 2 MILLIGRAM(S): 2 INJECTION INTRAMUSCULAR; INTRAVENOUS; SUBCUTANEOUS at 13:00

## 2018-06-28 RX ADMIN — PANTOPRAZOLE SODIUM 40 MILLIGRAM(S): 20 TABLET, DELAYED RELEASE ORAL at 06:08

## 2018-06-28 RX ADMIN — HYDROMORPHONE HYDROCHLORIDE 0.5 MILLIGRAM(S): 2 INJECTION INTRAMUSCULAR; INTRAVENOUS; SUBCUTANEOUS at 14:03

## 2018-06-28 RX ADMIN — Medication 12.5 MILLIGRAM(S): at 18:06

## 2018-06-28 RX ADMIN — HYDROMORPHONE HYDROCHLORIDE 0.5 MILLIGRAM(S): 2 INJECTION INTRAMUSCULAR; INTRAVENOUS; SUBCUTANEOUS at 14:54

## 2018-06-28 RX ADMIN — BUDESONIDE AND FORMOTEROL FUMARATE DIHYDRATE 2 PUFF(S): 160; 4.5 AEROSOL RESPIRATORY (INHALATION) at 18:10

## 2018-06-28 RX ADMIN — GABAPENTIN 200 MILLIGRAM(S): 400 CAPSULE ORAL at 13:46

## 2018-06-28 RX ADMIN — HYDROMORPHONE HYDROCHLORIDE 0.5 MILLIGRAM(S): 2 INJECTION INTRAMUSCULAR; INTRAVENOUS; SUBCUTANEOUS at 22:11

## 2018-06-28 RX ADMIN — LORATADINE 10 MILLIGRAM(S): 10 TABLET ORAL at 13:47

## 2018-06-28 RX ADMIN — Medication 1 TABLET(S): at 21:37

## 2018-06-28 RX ADMIN — GABAPENTIN 200 MILLIGRAM(S): 400 CAPSULE ORAL at 21:37

## 2018-06-28 RX ADMIN — HYDROMORPHONE HYDROCHLORIDE 2 MILLIGRAM(S): 2 INJECTION INTRAMUSCULAR; INTRAVENOUS; SUBCUTANEOUS at 14:54

## 2018-06-28 RX ADMIN — Medication 1 APPLICATION(S): at 18:08

## 2018-06-28 RX ADMIN — HYDROMORPHONE HYDROCHLORIDE 0.5 MILLIGRAM(S): 2 INJECTION INTRAMUSCULAR; INTRAVENOUS; SUBCUTANEOUS at 10:11

## 2018-06-28 RX ADMIN — Medication 1 MILLIGRAM(S): at 13:46

## 2018-06-28 RX ADMIN — Medication 1 TABLET(S): at 13:46

## 2018-06-28 RX ADMIN — MINOCYCLINE HYDROCHLORIDE 100 MILLIGRAM(S): 45 TABLET, EXTENDED RELEASE ORAL at 18:06

## 2018-06-28 RX ADMIN — Medication 1 APPLICATION(S): at 06:35

## 2018-06-28 RX ADMIN — Medication 500 MILLIGRAM(S): at 13:45

## 2018-06-28 RX ADMIN — HYDROMORPHONE HYDROCHLORIDE 0.5 MILLIGRAM(S): 2 INJECTION INTRAMUSCULAR; INTRAVENOUS; SUBCUTANEOUS at 06:08

## 2018-06-28 RX ADMIN — GABAPENTIN 200 MILLIGRAM(S): 400 CAPSULE ORAL at 06:09

## 2018-06-28 RX ADMIN — HYDROMORPHONE HYDROCHLORIDE 0.5 MILLIGRAM(S): 2 INJECTION INTRAMUSCULAR; INTRAVENOUS; SUBCUTANEOUS at 19:06

## 2018-06-28 RX ADMIN — Medication 1 TABLET(S): at 06:09

## 2018-06-28 RX ADMIN — BUDESONIDE AND FORMOTEROL FUMARATE DIHYDRATE 2 PUFF(S): 160; 4.5 AEROSOL RESPIRATORY (INHALATION) at 06:08

## 2018-06-28 RX ADMIN — MINOCYCLINE HYDROCHLORIDE 100 MILLIGRAM(S): 45 TABLET, EXTENDED RELEASE ORAL at 06:08

## 2018-06-28 RX ADMIN — HYDROMORPHONE HYDROCHLORIDE 0.5 MILLIGRAM(S): 2 INJECTION INTRAMUSCULAR; INTRAVENOUS; SUBCUTANEOUS at 18:05

## 2018-06-28 RX ADMIN — WARFARIN SODIUM 1 MILLIGRAM(S): 2.5 TABLET ORAL at 21:37

## 2018-06-28 NOTE — PROGRESS NOTE ADULT - SUBJECTIVE AND OBJECTIVE BOX
Patient is a 77y old  Female who presents with a chief complaint of fever, lethargy (07 Jun 2018 22:19)      SUBJECTIVE / OVERNIGHT EVENTS:    Patient seen and examined. denies cp sob nvd.         Vital Signs Last 24 Hrs  T(C): 36.7 (28 Jun 2018 06:10), Max: 36.9 (27 Jun 2018 17:23)  T(F): 98 (28 Jun 2018 06:10), Max: 98.5 (27 Jun 2018 17:23)  HR: 75 (28 Jun 2018 06:10) (64 - 75)  BP: 100/67 (28 Jun 2018 06:10) (100/67 - 130/79)  BP(mean): --  RR: 18 (28 Jun 2018 06:10) (18 - 18)  SpO2: 97% (28 Jun 2018 06:10) (95% - 97%)  I&O's Summary    27 Jun 2018 07:01  -  28 Jun 2018 07:00  --------------------------------------------------------  IN: 540 mL / OUT: 600 mL / NET: -60 mL        PE:  GENERAL: NAD, AAOx3, obese  HEAD:  Atraumatic, Normocephalic  EYES:  conjunctiva and sclera clear  NECK: Supple, No JVD  CHEST/LUNG: CTABL, No wheeze  HEART: Regular rate and rhythm; no murmur  ABDOMEN: Soft, Nontender, Nondistended; Bowel sounds present, abd vac  EXTREMITIES:  2+ Peripheral Pulses, right thigh and right knee vac, right lateral posterior thigh vac  SKIN: No rashes or lesions  NEURO: No focal deficits    LABS:          PT/INR - ( 28 Jun 2018 07:32 )   PT: 24.6 sec;   INR: 2.24 ratio           CAPILLARY BLOOD GLUCOSE                RADIOLOGY & ADDITIONAL TESTS:    Imaging Personally Reviewed:  [x] YES  [ ] NO    Consultant(s) Notes Reviewed:  [x] YES  [ ] NO    MEDICATIONS  (STANDING):  ascorbic acid 500 milliGRAM(s) Oral daily  aspirin enteric coated 81 milliGRAM(s) Oral daily  betamethasone valerate 0.1% Ointment 1 Application(s) Topical two times a day  buDESOnide 160 MICROgram(s)/formoterol 4.5 MICROgram(s) Inhaler 2 Puff(s) Inhalation two times a day  calcium carbonate 1250 mG + Vitamin D (OsCal 500 + D) 1 Tablet(s) Oral three times a day  folic acid 1 milliGRAM(s) Oral daily  gabapentin 200 milliGRAM(s) Oral three times a day  loratadine 10 milliGRAM(s) Oral daily  melatonin 3 milliGRAM(s) Oral at bedtime  metoprolol tartrate 12.5 milliGRAM(s) Oral two times a day  minocycline 100 milliGRAM(s) Oral two times a day  multivitamin 1 Tablet(s) Oral daily  ondansetron Injectable 4 milliGRAM(s) IV Push once  pantoprazole    Tablet 40 milliGRAM(s) Oral before breakfast  polyethylene glycol 3350 17 Gram(s) Oral daily  simvastatin 20 milliGRAM(s) Oral at bedtime  tiotropium 18 MICROgram(s) Capsule 1 Capsule(s) Inhalation daily    MEDICATIONS  (PRN):  acetaminophen   Tablet. 650 milliGRAM(s) Oral every 6 hours PRN Mild Pain (1 - 3)  ALPRAZolam 0.25 milliGRAM(s) Oral daily PRN anxiety  bisacodyl Suppository 10 milliGRAM(s) Rectal daily PRN Constipation  bismuth subsalicylate Liquid 30 milliLiter(s) Oral every 6 hours PRN Cramping/abd pain  HYDROmorphone   Tablet 2 milliGRAM(s) Oral every 3 hours PRN Moderate Pain (4 - 6)  HYDROmorphone  Injectable 0.5 milliGRAM(s) IV Push every 4 hours PRN Severe Pain (7 - 10)  senna 2 Tablet(s) Oral at bedtime PRN Constipation  simethicone 80 milliGRAM(s) Chew two times a day PRN Gas      Care Discussed with Consultants/Other Providers [x] YES  [ ] NO    HEALTH ISSUES - PROBLEM Dx:  Urticaria: Urticaria  Joint infection: Joint infection  Other problems related to medical facilities and other health care: Other problems related to medical facilities and other health care  S/P TAVR (transcatheter aortic valve replacement): S/P TAVR (transcatheter aortic valve replacement)  Acute cystitis without hematuria: Acute cystitis without hematuria  Wound infection: Wound infection  Acute appendicitis, unspecified acute appendicitis type: Acute appendicitis, unspecified acute appendicitis type  Fever, unspecified fever cause: Fever, unspecified fever cause

## 2018-06-28 NOTE — PROGRESS NOTE ADULT - ASSESSMENT
no plan for orthopedic surgical intervention per family and patient preference  vac and wound care per PRS and wound care teams  PO abx per ID team, minocycline  encourage patient to spend majority of bed oob in bedside chair as able, has not been oob recently  PT to help mobilize, she must remain 50% wb on the RLE and NO KNEE MOTION allowed, knee immobilizer if oob  coumadin, inr goal 2-3  continue to optimize nutrition  continue to involve psych  derm work up for leg lesions negative for etiology, escoriations unclear etiology as well  while orthopedic issues are stable for dispo, please make sure patient and family is okay with discharge before initiating planning process

## 2018-06-28 NOTE — PROGRESS NOTE ADULT - SUBJECTIVE AND OBJECTIVE BOX
pain controlled, no pain today, in good spirits, asking if she can discharge hospital    abdominal wound - vac  RLE  thigh - dressing per wound care teams  knee wound with vac   remainder of wounds dressed by wound care  5/5 ta/ehl/gcs  silt l4-s1  2+ dp pulse    ROS: depressed

## 2018-06-28 NOTE — PROGRESS NOTE ADULT - ASSESSMENT
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    # acute appendicitis  Completed 10d course Meropenem, resolved    # abd wound, multiple bl leg wounds  continue vac to abdomen, rt posterior thigh and rt Knee, vac changes M/W/F  appreciate wound care recs  cont NS irrigation, cavilon, medihoney  pain control  appreciate derm recs, 6/22 left thigh and left arm punch biopsies resulted and show perivascular and slightly interstitial infiltrate of lymphocytes, neutrophils, and eosinophils, consistent with urticaria. appreciate allergy recs. pt currently asymptomatic so will hold off antihistamine.  tissue culture grew coag neg staph which can be normal roopa / colonized    #TKR wound-mrsa-chr mionocycline    # Pain control  dilaudid2 po q4 prn, 0.5 iv prior to dressing change  bowel regimen    # chr Anemia  stable, monitor h/h closely    # S/P TAVR (transcatheter aortic valve replacement)  continue Coumadin, daily INR    # adjustment disorder with depressed mood  appreciate psych re eval  xanax prn    plan of care dw patient, we discussed discharge planning. pt states she is comfortable here but understands she cannot stay here forever.  discussed with Mr. Rdz [phone] regarding derm and allergy recs. he understands there is no further derm workup intpt. He is concerned that rehab will not be able to care for her wounds. He states at [facility] they did not have the medications for proper wound care, they could not take care of the vacs appropriately. he would like to review the wound care instructions before he agrees to discharge planning. 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    # acute appendicitis  Completed 10d course Meropenem, resolved    # abd wound, multiple bl leg wounds  continue vac to abdomen, rt posterior thigh and rt Knee, vac changes M/W/F  appreciate wound care recs  cont NS irrigation, cavilon, medihoney  pain control  appreciate derm's detailed note  6/22 left thigh and left arm punch biopsies resulted and show perivascular and slightly interstitial infiltrate of lymphocytes, neutrophils, and eosinophils, consistent with urticaria. appreciate allergy recs. pt currently asymptomatic so will hold off antihistamine.  tissue culture grew coag neg staph which can be normal roopa / colonized    #TKR wound-mrsa-chr mionocycline    # Pain control  dilaudid2 po q4 prn, 0.5 iv prior to dressing change  bowel regimen    # chr Anemia  stable, monitor h/h closely    # S/P TAVR (transcatheter aortic valve replacement)  continue Coumadin, daily INR    # adjustment disorder with depressed mood  appreciate psych re eval  xanax prn    plan of care dw patient, we discussed discharge planning. pt states she is comfortable here in the hospital but understands she cannot stay here forever.  discussed with Mr. Rdz [phone] regarding derm and allergy recs. he understands there is no further workup planned. He understands the wounds are improving. He is concerned that rehab will not be able to care for her wounds and she will end up back in the hospital. He states that at [facility], they did not have the medications that were needed for the wound care. He felt rehab did not take care of the wound vacs appropriately and believes that is why she ended back in the hospital. I explained that she returned to the hospital bc of appendicitis but he believes the wounds were not cared for appropriately at rehab.  Mr Rdz who is a pharmacist would like to review the wound care instructions/recommendation before he agrees to discussing discharge planning with the CM. I have asked the NP to review the wound care orders with Mr. Rdz when he arrives this afternoon. 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    # acute appendicitis  Completed 10d course Meropenem, resolved    # abd wound, multiple bl leg wounds  continue vac to abdomen, rt posterior thigh and rt Knee, vac changes M/W/F  appreciate wound care recs  cont NS irrigation, cavilon, medihoney  pain control  appreciate derm's detailed note  6/22 left thigh and left arm punch biopsies resulted and show perivascular and slightly interstitial infiltrate of lymphocytes, neutrophils, and eosinophils, consistent with urticaria. appreciate allergy recs. pt currently asymptomatic so will hold off antihistamine.  tissue culture grew coag neg staph which can be normal roopa / colonized    #TKR wound-mrsa-chr mionocycline    # Pain control  dilaudid2 po q4 prn, 0.5 iv prior to dressing change  bowel regimen    # chr Anemia  stable, monitor h/h closely    # S/P TAVR (transcatheter aortic valve replacement)  continue Coumadin, daily INR    # adjustment disorder with depressed mood  appreciate psych re eval  xanax prn    dispo:  plan of care dw patient, we discussed discharge planning. pt states she is comfortable here in the hospital but understands she cannot stay here forever.  discussed with Mr. Rdz [phone] regarding derm and allergy recs. he understands there is no further workup planned. He understands the wounds are improving. He is concerned that rehab will not be able to care for her wounds and she will end up back in the hospital. He states that at [facility], they did not have the medications that were needed for the wound care. He felt rehab did not take care of the wound vacs appropriately and believes that is why she ended back in the hospital. I explained that she returned to the hospital bc of appendicitis but he believes the wounds were not cared for appropriately at rehab.  Mr Rdz who is a pharmacist would like to review the wound care instructions/recommendation before he agrees to discussing discharge planning with the CM. I have asked the NP to review the wound care orders with Mr. Rdz when he arrives this afternoon.   then I was asked to give daughter a call [phone]  daughter does not believe patient is ready for discharge. states she is not authorizing discharge. believes that we still have not found any answers to the ulcers. dtr believes we are missing something. asks for rheumatology to follow up. dtr states her mother  is still in "critical condition." states rehab cannot handle her complex wounds. dtr mentions that if patient leaves the hospital and something happens to the patient, she would feel the hospital is liable. dtr believes we are "giving up." dtr expects wounds to head before considering discharge from the hospital.

## 2018-06-29 LAB
ANION GAP SERPL CALC-SCNC: 13 MMOL/L — SIGNIFICANT CHANGE UP (ref 5–17)
APTT BLD: 45 SEC — HIGH (ref 27.5–37.4)
BUN SERPL-MCNC: 30 MG/DL — HIGH (ref 7–23)
CALCIUM SERPL-MCNC: 9.6 MG/DL — SIGNIFICANT CHANGE UP (ref 8.4–10.5)
CHLORIDE SERPL-SCNC: 102 MMOL/L — SIGNIFICANT CHANGE UP (ref 96–108)
CO2 SERPL-SCNC: 26 MMOL/L — SIGNIFICANT CHANGE UP (ref 22–31)
CREAT SERPL-MCNC: 0.7 MG/DL — SIGNIFICANT CHANGE UP (ref 0.5–1.3)
GLUCOSE SERPL-MCNC: 109 MG/DL — HIGH (ref 70–99)
HCT VFR BLD CALC: 28.6 % — LOW (ref 34.5–45)
HGB BLD-MCNC: 8.7 G/DL — LOW (ref 11.5–15.5)
INR BLD: 2.3 RATIO — HIGH (ref 0.88–1.16)
MCHC RBC-ENTMCNC: 27.5 PG — SIGNIFICANT CHANGE UP (ref 27–34)
MCHC RBC-ENTMCNC: 30.4 GM/DL — LOW (ref 32–36)
MCV RBC AUTO: 90.5 FL — SIGNIFICANT CHANGE UP (ref 80–100)
PLATELET # BLD AUTO: 213 K/UL — SIGNIFICANT CHANGE UP (ref 150–400)
POTASSIUM SERPL-MCNC: 4.6 MMOL/L — SIGNIFICANT CHANGE UP (ref 3.5–5.3)
POTASSIUM SERPL-SCNC: 4.6 MMOL/L — SIGNIFICANT CHANGE UP (ref 3.5–5.3)
PROTHROM AB SERPL-ACNC: 26.4 SEC — HIGH (ref 10–13.1)
RBC # BLD: 3.16 M/UL — LOW (ref 3.8–5.2)
RBC # FLD: 18.8 % — HIGH (ref 10.3–14.5)
SODIUM SERPL-SCNC: 141 MMOL/L — SIGNIFICANT CHANGE UP (ref 135–145)
WBC # BLD: 8.29 K/UL — SIGNIFICANT CHANGE UP (ref 3.8–10.5)
WBC # FLD AUTO: 8.29 K/UL — SIGNIFICANT CHANGE UP (ref 3.8–10.5)

## 2018-06-29 RX ADMIN — SIMVASTATIN 20 MILLIGRAM(S): 20 TABLET, FILM COATED ORAL at 22:06

## 2018-06-29 RX ADMIN — HYDROMORPHONE HYDROCHLORIDE 2 MILLIGRAM(S): 2 INJECTION INTRAMUSCULAR; INTRAVENOUS; SUBCUTANEOUS at 22:13

## 2018-06-29 RX ADMIN — Medication 1 TABLET(S): at 13:25

## 2018-06-29 RX ADMIN — MINOCYCLINE HYDROCHLORIDE 100 MILLIGRAM(S): 45 TABLET, EXTENDED RELEASE ORAL at 06:01

## 2018-06-29 RX ADMIN — GABAPENTIN 200 MILLIGRAM(S): 400 CAPSULE ORAL at 22:06

## 2018-06-29 RX ADMIN — Medication 12.5 MILLIGRAM(S): at 06:01

## 2018-06-29 RX ADMIN — HYDROMORPHONE HYDROCHLORIDE 0.5 MILLIGRAM(S): 2 INJECTION INTRAMUSCULAR; INTRAVENOUS; SUBCUTANEOUS at 05:53

## 2018-06-29 RX ADMIN — Medication 1 TABLET(S): at 06:01

## 2018-06-29 RX ADMIN — Medication 3 MILLIGRAM(S): at 22:06

## 2018-06-29 RX ADMIN — TIOTROPIUM BROMIDE 1 CAPSULE(S): 18 CAPSULE ORAL; RESPIRATORY (INHALATION) at 13:27

## 2018-06-29 RX ADMIN — Medication 1 MILLIGRAM(S): at 13:26

## 2018-06-29 RX ADMIN — HYDROMORPHONE HYDROCHLORIDE 0.5 MILLIGRAM(S): 2 INJECTION INTRAMUSCULAR; INTRAVENOUS; SUBCUTANEOUS at 06:23

## 2018-06-29 RX ADMIN — HYDROMORPHONE HYDROCHLORIDE 2 MILLIGRAM(S): 2 INJECTION INTRAMUSCULAR; INTRAVENOUS; SUBCUTANEOUS at 18:42

## 2018-06-29 RX ADMIN — HYDROMORPHONE HYDROCHLORIDE 0.5 MILLIGRAM(S): 2 INJECTION INTRAMUSCULAR; INTRAVENOUS; SUBCUTANEOUS at 10:15

## 2018-06-29 RX ADMIN — HYDROMORPHONE HYDROCHLORIDE 2 MILLIGRAM(S): 2 INJECTION INTRAMUSCULAR; INTRAVENOUS; SUBCUTANEOUS at 23:00

## 2018-06-29 RX ADMIN — Medication 500 MILLIGRAM(S): at 13:25

## 2018-06-29 RX ADMIN — PANTOPRAZOLE SODIUM 40 MILLIGRAM(S): 20 TABLET, DELAYED RELEASE ORAL at 06:01

## 2018-06-29 RX ADMIN — HYDROMORPHONE HYDROCHLORIDE 2 MILLIGRAM(S): 2 INJECTION INTRAMUSCULAR; INTRAVENOUS; SUBCUTANEOUS at 11:11

## 2018-06-29 RX ADMIN — HYDROMORPHONE HYDROCHLORIDE 0.5 MILLIGRAM(S): 2 INJECTION INTRAMUSCULAR; INTRAVENOUS; SUBCUTANEOUS at 16:43

## 2018-06-29 RX ADMIN — HYDROMORPHONE HYDROCHLORIDE 2 MILLIGRAM(S): 2 INJECTION INTRAMUSCULAR; INTRAVENOUS; SUBCUTANEOUS at 11:45

## 2018-06-29 RX ADMIN — POLYETHYLENE GLYCOL 3350 17 GRAM(S): 17 POWDER, FOR SOLUTION ORAL at 13:27

## 2018-06-29 RX ADMIN — Medication 1 TABLET(S): at 22:06

## 2018-06-29 RX ADMIN — HYDROMORPHONE HYDROCHLORIDE 0.5 MILLIGRAM(S): 2 INJECTION INTRAMUSCULAR; INTRAVENOUS; SUBCUTANEOUS at 20:38

## 2018-06-29 RX ADMIN — BUDESONIDE AND FORMOTEROL FUMARATE DIHYDRATE 2 PUFF(S): 160; 4.5 AEROSOL RESPIRATORY (INHALATION) at 05:59

## 2018-06-29 RX ADMIN — Medication 1 APPLICATION(S): at 06:03

## 2018-06-29 RX ADMIN — BUDESONIDE AND FORMOTEROL FUMARATE DIHYDRATE 2 PUFF(S): 160; 4.5 AEROSOL RESPIRATORY (INHALATION) at 18:10

## 2018-06-29 RX ADMIN — GABAPENTIN 200 MILLIGRAM(S): 400 CAPSULE ORAL at 06:01

## 2018-06-29 RX ADMIN — HYDROMORPHONE HYDROCHLORIDE 0.5 MILLIGRAM(S): 2 INJECTION INTRAMUSCULAR; INTRAVENOUS; SUBCUTANEOUS at 10:12

## 2018-06-29 RX ADMIN — Medication 81 MILLIGRAM(S): at 13:25

## 2018-06-29 RX ADMIN — Medication 1 APPLICATION(S): at 18:10

## 2018-06-29 RX ADMIN — HYDROMORPHONE HYDROCHLORIDE 0.5 MILLIGRAM(S): 2 INJECTION INTRAMUSCULAR; INTRAVENOUS; SUBCUTANEOUS at 16:08

## 2018-06-29 RX ADMIN — LORATADINE 10 MILLIGRAM(S): 10 TABLET ORAL at 13:25

## 2018-06-29 RX ADMIN — GABAPENTIN 200 MILLIGRAM(S): 400 CAPSULE ORAL at 13:24

## 2018-06-29 RX ADMIN — HYDROMORPHONE HYDROCHLORIDE 2 MILLIGRAM(S): 2 INJECTION INTRAMUSCULAR; INTRAVENOUS; SUBCUTANEOUS at 19:12

## 2018-06-29 RX ADMIN — HYDROMORPHONE HYDROCHLORIDE 0.5 MILLIGRAM(S): 2 INJECTION INTRAMUSCULAR; INTRAVENOUS; SUBCUTANEOUS at 20:08

## 2018-06-29 RX ADMIN — MINOCYCLINE HYDROCHLORIDE 100 MILLIGRAM(S): 45 TABLET, EXTENDED RELEASE ORAL at 18:10

## 2018-06-29 RX ADMIN — Medication 12.5 MILLIGRAM(S): at 18:11

## 2018-06-29 NOTE — PROGRESS NOTE ADULT - SUBJECTIVE AND OBJECTIVE BOX
Patient is a 77y old  Female who presents with a chief complaint of fever, lethargy (07 Jun 2018 22:19)      SUBJECTIVE / OVERNIGHT EVENTS:    Patient seen and examined. denies cp sob nvd.       Vital Signs Last 24 Hrs  T(C): 37.2 (29 Jun 2018 06:20), Max: 37.2 (29 Jun 2018 06:20)  T(F): 98.9 (29 Jun 2018 06:20), Max: 98.9 (29 Jun 2018 06:20)  HR: 73 (29 Jun 2018 06:20) (65 - 89)  BP: 124/73 (29 Jun 2018 06:20) (115/72 - 125/77)  BP(mean): --  RR: 18 (29 Jun 2018 06:20) (18 - 18)  SpO2: 94% (29 Jun 2018 06:20) (94% - 94%)  I&O's Summary      PE:  GENERAL: NAD, AAOx3, obese  HEAD:  Atraumatic, Normocephalic  EYES:  conjunctiva and sclera clear  NECK: Supple, No JVD  CHEST/LUNG: CTABL, No wheeze  HEART: Regular rate and rhythm; no murmur  ABDOMEN: Soft, Nontender, Nondistended; Bowel sounds present, abd vac  EXTREMITIES:  2+ Peripheral Pulses, right thigh and right knee vac, right lateral posterior thigh vac, dressing left medial thigh, right medial thigh  SKIN: No rashes or lesions  NEURO: No focal deficits    LABS:                        8.7    8.29  )-----------( 213      ( 29 Jun 2018 09:44 )             28.6     06-29    141  |  102  |  30<H>  ----------------------------<  109<H>  4.6   |  26  |  0.70    Ca    9.6      29 Jun 2018 07:40      PT/INR - ( 29 Jun 2018 09:41 )   PT: 26.4 sec;   INR: 2.30 ratio         PTT - ( 29 Jun 2018 09:41 )  PTT:45.0 sec  CAPILLARY BLOOD GLUCOSE                RADIOLOGY & ADDITIONAL TESTS:    Imaging Personally Reviewed:  [x] YES  [ ] NO    Consultant(s) Notes Reviewed:  [x] YES  [ ] NO    MEDICATIONS  (STANDING):  ascorbic acid 500 milliGRAM(s) Oral daily  aspirin enteric coated 81 milliGRAM(s) Oral daily  betamethasone valerate 0.1% Ointment 1 Application(s) Topical two times a day  buDESOnide 160 MICROgram(s)/formoterol 4.5 MICROgram(s) Inhaler 2 Puff(s) Inhalation two times a day  calcium carbonate 1250 mG + Vitamin D (OsCal 500 + D) 1 Tablet(s) Oral three times a day  folic acid 1 milliGRAM(s) Oral daily  gabapentin 200 milliGRAM(s) Oral three times a day  loratadine 10 milliGRAM(s) Oral daily  melatonin 3 milliGRAM(s) Oral at bedtime  metoprolol tartrate 12.5 milliGRAM(s) Oral two times a day  minocycline 100 milliGRAM(s) Oral two times a day  multivitamin 1 Tablet(s) Oral daily  ondansetron Injectable 4 milliGRAM(s) IV Push once  pantoprazole    Tablet 40 milliGRAM(s) Oral before breakfast  polyethylene glycol 3350 17 Gram(s) Oral daily  simvastatin 20 milliGRAM(s) Oral at bedtime  tiotropium 18 MICROgram(s) Capsule 1 Capsule(s) Inhalation daily    MEDICATIONS  (PRN):  acetaminophen   Tablet. 650 milliGRAM(s) Oral every 6 hours PRN Mild Pain (1 - 3)  ALPRAZolam 0.25 milliGRAM(s) Oral daily PRN anxiety  bisacodyl Suppository 10 milliGRAM(s) Rectal daily PRN Constipation  bismuth subsalicylate Liquid 30 milliLiter(s) Oral every 6 hours PRN Cramping/abd pain  HYDROmorphone   Tablet 2 milliGRAM(s) Oral every 3 hours PRN Moderate Pain (4 - 6)  HYDROmorphone  Injectable 0.5 milliGRAM(s) IV Push every 4 hours PRN Severe Pain (7 - 10)  senna 2 Tablet(s) Oral at bedtime PRN Constipation  simethicone 80 milliGRAM(s) Chew two times a day PRN Gas      Care Discussed with Consultants/Other Providers [x] YES  [ ] NO    HEALTH ISSUES - PROBLEM Dx:  Urticaria: Urticaria  Joint infection: Joint infection  Other problems related to medical facilities and other health care: Other problems related to medical facilities and other health care  S/P TAVR (transcatheter aortic valve replacement): S/P TAVR (transcatheter aortic valve replacement)  Acute cystitis without hematuria: Acute cystitis without hematuria  Wound infection: Wound infection  Acute appendicitis, unspecified acute appendicitis type: Acute appendicitis, unspecified acute appendicitis type  Fever, unspecified fever cause: Fever, unspecified fever cause

## 2018-06-29 NOTE — PROGRESS NOTE ADULT - ASSESSMENT
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    # acute appendicitis  Completed 10d course Meropenem, resolved    # abd wound, multiple bl leg wounds  continue vac to abdomen, rt posterior thigh and rt Knee, vac changes M/W/F  appreciate wound care recs, cont NS irrigation, cavilon, medihoney  pain control  appreciate derm's detailed note  6/22 left thigh and left arm punch biopsies resulted and show perivascular and slightly interstitial infiltrate of lymphocytes, neutrophils, and eosinophils, consistent with urticaria. appreciate allergy recs. pt currently asymptomatic so will hold off antihistamine.  tissue culture grew coag neg staph which can be normal roopa / colonized    # polymyalgia rheumatica  steroids on hold to allow for wound healing  rheum consult as family request    #TKR wound-mrsa  chr minocycline    # Pain control  dilaudid2 po q4 prn, 0.5 iv prior to dressing change  bowel regimen    # chr Anemia  stable, monitor h/h closely    # S/P TAVR (transcatheter aortic valve replacement)  continue Coumadin, daily INR    # adjustment disorder with depressed mood  appreciate psych re eval  xanax prn    dispo:  had lengthy discussion with  and daughter yesterday. awaiting rheum consult as requested by taya. family continues to feel pt wounds will not be properly managed anywhere except the hospital.

## 2018-06-29 NOTE — PROGRESS NOTE ADULT - ASSESSMENT
A/P: s/p Right total knee insertion of spacer, irrigation and debridement, complex wound closure 3/23; readmitted 6/8 for appendicitis  - cont vac change to R thigh and abdomen M/W/F  - continue instill VAC therapy to R knee  - remainder of wounds per wound care/derm  - will cont to follow  - dispo per primary    Plastic Surgery (pg [facility]: [id number], NS: [phone])

## 2018-06-29 NOTE — PROGRESS NOTE ADULT - SUBJECTIVE AND OBJECTIVE BOX
Plastic Surgery Progress Note (pg [facility]: [id number], NS: [phone])    SUBJECTIVE:  The patient was seen and examined. No acute events overnight. Pain controlled.    OBJECTIVE:     ** VITAL SIGNS / I&O's **    Vital Signs Last 24 Hrs  T(C): 37.2 (29 Jun 2018 06:20), Max: 37.2 (29 Jun 2018 06:20)  T(F): 98.9 (29 Jun 2018 06:20), Max: 98.9 (29 Jun 2018 06:20)  HR: 73 (29 Jun 2018 06:20) (65 - 89)  BP: 124/73 (29 Jun 2018 06:20) (115/72 - 125/77)  BP(mean): --  RR: 18 (29 Jun 2018 06:20) (18 - 18)  SpO2: 94% (29 Jun 2018 06:20) (94% - 94%)        ** PHYSICAL EXAM **    -- CONSTITUTIONAL: Alert, NAD  -- ABD: central abd VAC intact set to suction  -- Ext: R thigh VAC intact holding suction, Right knee instill VAC w/ black foam in place with straw-colored fluid in canister    ** LABS **      29 Jun 2018 07:40    141    |  102    |  30     ----------------------------<  109    4.6     |  26     |  0.70     Ca    9.6        29 Jun 2018 07:40      PT/INR - ( 28 Jun 2018 07:32 )   PT: 24.6 sec;   INR: 2.24 ratio           CAPILLARY BLOOD GLUCOSE                    ** MEDICATIONS **  MEDICATIONS  (STANDING):  ascorbic acid 500 milliGRAM(s) Oral daily  aspirin enteric coated 81 milliGRAM(s) Oral daily  betamethasone valerate 0.1% Ointment 1 Application(s) Topical two times a day  buDESOnide 160 MICROgram(s)/formoterol 4.5 MICROgram(s) Inhaler 2 Puff(s) Inhalation two times a day  calcium carbonate 1250 mG + Vitamin D (OsCal 500 + D) 1 Tablet(s) Oral three times a day  folic acid 1 milliGRAM(s) Oral daily  gabapentin 200 milliGRAM(s) Oral three times a day  loratadine 10 milliGRAM(s) Oral daily  melatonin 3 milliGRAM(s) Oral at bedtime  metoprolol tartrate 12.5 milliGRAM(s) Oral two times a day  minocycline 100 milliGRAM(s) Oral two times a day  multivitamin 1 Tablet(s) Oral daily  ondansetron Injectable 4 milliGRAM(s) IV Push once  pantoprazole    Tablet 40 milliGRAM(s) Oral before breakfast  polyethylene glycol 3350 17 Gram(s) Oral daily  simvastatin 20 milliGRAM(s) Oral at bedtime  tiotropium 18 MICROgram(s) Capsule 1 Capsule(s) Inhalation daily    MEDICATIONS  (PRN):  acetaminophen   Tablet. 650 milliGRAM(s) Oral every 6 hours PRN Mild Pain (1 - 3)  ALPRAZolam 0.25 milliGRAM(s) Oral daily PRN anxiety  bisacodyl Suppository 10 milliGRAM(s) Rectal daily PRN Constipation  bismuth subsalicylate Liquid 30 milliLiter(s) Oral every 6 hours PRN Cramping/abd pain  HYDROmorphone   Tablet 2 milliGRAM(s) Oral every 3 hours PRN Moderate Pain (4 - 6)  HYDROmorphone  Injectable 0.5 milliGRAM(s) IV Push every 4 hours PRN Severe Pain (7 - 10)  senna 2 Tablet(s) Oral at bedtime PRN Constipation  simethicone 80 milliGRAM(s) Chew two times a day PRN Gas

## 2018-06-29 NOTE — CONSULT NOTE ADULT - SUBJECTIVE AND OBJECTIVE BOX
HISTORY OF PRESENT ILLNESS:  78 yo female with h/o mult medical issues as below; h/o PMR though she is currently off steroids w/o significant joint pains.  Asked by family to be evaluated for rheumatic/autoimmune dz.  She has had multiple wounds form since previous surgery    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  Artificial cardiac pacemaker: 12/25/17  H/O cardiac catheterization: 11/29/16 HERBERT placed on LAD  H/O endoscopy: with dilatation of esophageal stricture 2013  S/P cataract surgery: x2; 3-4yr ago  S/P gastroplasty: 28 yrs ago  S/P cholecystectomy: more than 15 years ago  S/P total knee replacement: left, 15yr ago  S/P total knee replacement: right, 12yr ago  S/P hysterectomy: 24yr ago        MEDICATIONS  (STANDING):  ascorbic acid 500 milliGRAM(s) Oral daily  aspirin enteric coated 81 milliGRAM(s) Oral daily  betamethasone valerate 0.1% Ointment 1 Application(s) Topical two times a day  buDESOnide 160 MICROgram(s)/formoterol 4.5 MICROgram(s) Inhaler 2 Puff(s) Inhalation two times a day  calcium carbonate 1250 mG + Vitamin D (OsCal 500 + D) 1 Tablet(s) Oral three times a day  folic acid 1 milliGRAM(s) Oral daily  gabapentin 200 milliGRAM(s) Oral three times a day  loratadine 10 milliGRAM(s) Oral daily  melatonin 3 milliGRAM(s) Oral at bedtime  metoprolol tartrate 12.5 milliGRAM(s) Oral two times a day  minocycline 100 milliGRAM(s) Oral two times a day  multivitamin 1 Tablet(s) Oral daily  ondansetron Injectable 4 milliGRAM(s) IV Push once  pantoprazole    Tablet 40 milliGRAM(s) Oral before breakfast  polyethylene glycol 3350 17 Gram(s) Oral daily  simvastatin 20 milliGRAM(s) Oral at bedtime  tiotropium 18 MICROgram(s) Capsule 1 Capsule(s) Inhalation daily    MEDICATIONS  (PRN):  acetaminophen   Tablet. 650 milliGRAM(s) Oral every 6 hours PRN Mild Pain (1 - 3)  ALPRAZolam 0.25 milliGRAM(s) Oral daily PRN anxiety  bisacodyl Suppository 10 milliGRAM(s) Rectal daily PRN Constipation  bismuth subsalicylate Liquid 30 milliLiter(s) Oral every 6 hours PRN Cramping/abd pain  HYDROmorphone   Tablet 2 milliGRAM(s) Oral every 3 hours PRN Moderate Pain (4 - 6)  HYDROmorphone  Injectable 0.5 milliGRAM(s) IV Push every 4 hours PRN Severe Pain (7 - 10)  senna 2 Tablet(s) Oral at bedtime PRN Constipation  simethicone 80 milliGRAM(s) Chew two times a day PRN Gas      Allergies    amoxicillin (Other)    Intolerances    IV Contrast (Flushing (Skin); Nausea)      PERTINENT MEDICATION HISTORY:    SOCIAL HISTORY:    FAMILY HISTORY:  No pertinent family history in first degree relatives      Vital Signs Last 24 Hrs  T(C): 37.1 (29 Jun 2018 14:27), Max: 37.2 (29 Jun 2018 06:20)  T(F): 98.8 (29 Jun 2018 14:27), Max: 98.9 (29 Jun 2018 06:20)  HR: 78 (29 Jun 2018 14:27) (68 - 89)  BP: 106/69 (29 Jun 2018 14:27) (100/61 - 124/73)  BP(mean): --  RR: 18 (29 Jun 2018 14:27) (18 - 18)  SpO2: 94% (29 Jun 2018 14:27) (92% - 94%)    Daily     Daily     PHYSICAL EXAM:      Constitutional:  Fair appearing    Eyes:  EOMI    ENMT:    Neck:    Breasts:    Back:    Respiratory:    Cardiovascular:    Gastrointestinal:  abd  soft nt, no masses, wound noted    Genitourinary:    Rectal:    Extremities:  No sign edema    Vascular:    Neurological:    Skin:  +wounds noted on abd, R knee, side of leg, groin; wounds are covered    Lymph Nodes:    Musculoskeletal:  No active synovitis, though pt with limited mobility  TKR b    Psychiatric:  appropriate, alert      LABS:                        8.7    8.29  )-----------( 213      ( 29 Jun 2018 09:44 )             28.6     06-29    141  |  102  |  30<H>  ----------------------------<  109<H>  4.6   |  26  |  0.70    Ca    9.6      29 Jun 2018 07:40      PT/INR - ( 29 Jun 2018 09:41 )   PT: 26.4 sec;   INR: 2.30 ratio         PTT - ( 29 Jun 2018 09:41 )  PTT:45.0 sec      RADIOLOGY & ADDITIONAL STUDIES:    Surgical Pathology Report (06.22.18 @ 17:00)    Surgical Pathology Report:   ACCESSION No:  [id number]    IRASEMA KOHLER                     2        Surgical Final Report          Final Diagnosis  1. Left thigh, punch biopsy  - Superficial perivascular lymphocytic infiltrate with  neutrophils and eosinophils and overlying acanthosis (see note).    Note: Multiple levels are examined. Sections show a perivascular  and slightly interstitial infiltrate of lymphocytes, neutrophils,  and eosinophils, consistent with urticaria. There is overlying  acanthosis with parakeratosis and focal hypergranulosis, possibly  secondary to irritation. No fungal organisms are seen with a  PAS/D stain.    2. Left arm, punch biopsy  - Sparse superficial perivascular lymphocytic infiltrate  with neutrophils and slight superficial dermal edema (see note).    Note:Multiple levels are examined. The findings are consistent  with urticaria.    Verified by: Romain José M.D., Dermatopathologist  (Electronic Signature)  Reported on: 06/26/18 13:14 EDT,[address], [address], [place] [address]  _________________________________________________________________    Clinical History  77-year-old female with itchy skin eruption on wrist. DDX:  urticaria vs. GA  Another eruption on thigh. Reports ulcer on legs appear like this  lesion. DDX: excoriation vs urticaria    Specimen(s) Submitted  1     Left thigh  2     Left arm    Gross Description  1. The specimen is received in formalin and the specimen  container is labeled: Left thigh.  It consists of a 0.3 x 0.3 cm  diameter punch biopsy of skin taken to a depth of 0.2 cm. The  epidermis is soft, tan and smooth. The specimen is entirely  submitted in one cassette.    2. The specimen is received in formalin and the specimen  container is labeled: Left arm. It consists of a0.4 x 0.3 cm  diameter punch biopsy of skin taken to a depth of 0.2 cm. The  epidermis is soft, tan and smooth. The specimen is entirely  submitted in one cassette.              IRASEMA KOHLER                     2        Surgical Final Report          In addition to other data that may appear on the specimen  containers, all labels have been inspected to confirm the  presence of the patient's name and date of birth.    CVR 06/22/18 23:49

## 2018-06-29 NOTE — CONSULT NOTE ADULT - ASSESSMENT
Imp:  After reviewing pathology and derm and wound care evaluations it seems excoriation and urticaria are the leading diagnoses.  Pyoderma was considered though the biopsy did not support this dx.   No sign of an active rheumatic disease such as PMR, SLE or vasculitis.    Rec:  Agree with treatment as recommended by derm and wound care  Would continue to hold steroids for now though they can be reintroduced if the lesions worsen.

## 2018-06-30 LAB
INR BLD: 2.44 RATIO — HIGH (ref 0.88–1.16)
PROTHROM AB SERPL-ACNC: 26.8 SEC — HIGH (ref 9.8–12.7)

## 2018-06-30 RX ORDER — WARFARIN SODIUM 2.5 MG/1
2 TABLET ORAL ONCE
Qty: 0 | Refills: 0 | Status: COMPLETED | OUTPATIENT
Start: 2018-06-30 | End: 2018-06-30

## 2018-06-30 RX ADMIN — Medication 81 MILLIGRAM(S): at 12:20

## 2018-06-30 RX ADMIN — Medication 1 APPLICATION(S): at 17:50

## 2018-06-30 RX ADMIN — WARFARIN SODIUM 2 MILLIGRAM(S): 2.5 TABLET ORAL at 21:44

## 2018-06-30 RX ADMIN — POLYETHYLENE GLYCOL 3350 17 GRAM(S): 17 POWDER, FOR SOLUTION ORAL at 12:20

## 2018-06-30 RX ADMIN — HYDROMORPHONE HYDROCHLORIDE 2 MILLIGRAM(S): 2 INJECTION INTRAMUSCULAR; INTRAVENOUS; SUBCUTANEOUS at 17:31

## 2018-06-30 RX ADMIN — HYDROMORPHONE HYDROCHLORIDE 2 MILLIGRAM(S): 2 INJECTION INTRAMUSCULAR; INTRAVENOUS; SUBCUTANEOUS at 23:11

## 2018-06-30 RX ADMIN — MINOCYCLINE HYDROCHLORIDE 100 MILLIGRAM(S): 45 TABLET, EXTENDED RELEASE ORAL at 17:49

## 2018-06-30 RX ADMIN — GABAPENTIN 200 MILLIGRAM(S): 400 CAPSULE ORAL at 21:44

## 2018-06-30 RX ADMIN — HYDROMORPHONE HYDROCHLORIDE 2 MILLIGRAM(S): 2 INJECTION INTRAMUSCULAR; INTRAVENOUS; SUBCUTANEOUS at 22:41

## 2018-06-30 RX ADMIN — Medication 650 MILLIGRAM(S): at 19:00

## 2018-06-30 RX ADMIN — BUDESONIDE AND FORMOTEROL FUMARATE DIHYDRATE 2 PUFF(S): 160; 4.5 AEROSOL RESPIRATORY (INHALATION) at 17:54

## 2018-06-30 RX ADMIN — Medication 1 APPLICATION(S): at 06:17

## 2018-06-30 RX ADMIN — Medication 3 MILLIGRAM(S): at 21:44

## 2018-06-30 RX ADMIN — TIOTROPIUM BROMIDE 1 CAPSULE(S): 18 CAPSULE ORAL; RESPIRATORY (INHALATION) at 12:20

## 2018-06-30 RX ADMIN — GABAPENTIN 200 MILLIGRAM(S): 400 CAPSULE ORAL at 12:33

## 2018-06-30 RX ADMIN — HYDROMORPHONE HYDROCHLORIDE 2 MILLIGRAM(S): 2 INJECTION INTRAMUSCULAR; INTRAVENOUS; SUBCUTANEOUS at 16:30

## 2018-06-30 RX ADMIN — Medication 30 MILLILITER(S): at 17:49

## 2018-06-30 RX ADMIN — GABAPENTIN 200 MILLIGRAM(S): 400 CAPSULE ORAL at 06:17

## 2018-06-30 RX ADMIN — Medication 12.5 MILLIGRAM(S): at 06:17

## 2018-06-30 RX ADMIN — Medication 1 TABLET(S): at 12:20

## 2018-06-30 RX ADMIN — LORATADINE 10 MILLIGRAM(S): 10 TABLET ORAL at 12:20

## 2018-06-30 RX ADMIN — SIMVASTATIN 20 MILLIGRAM(S): 20 TABLET, FILM COATED ORAL at 21:44

## 2018-06-30 RX ADMIN — Medication 1 MILLIGRAM(S): at 12:20

## 2018-06-30 RX ADMIN — BUDESONIDE AND FORMOTEROL FUMARATE DIHYDRATE 2 PUFF(S): 160; 4.5 AEROSOL RESPIRATORY (INHALATION) at 06:13

## 2018-06-30 RX ADMIN — Medication 500 MILLIGRAM(S): at 12:20

## 2018-06-30 RX ADMIN — HYDROMORPHONE HYDROCHLORIDE 0.5 MILLIGRAM(S): 2 INJECTION INTRAMUSCULAR; INTRAVENOUS; SUBCUTANEOUS at 06:37

## 2018-06-30 RX ADMIN — Medication 1 TABLET(S): at 21:44

## 2018-06-30 RX ADMIN — Medication 650 MILLIGRAM(S): at 18:01

## 2018-06-30 RX ADMIN — MINOCYCLINE HYDROCHLORIDE 100 MILLIGRAM(S): 45 TABLET, EXTENDED RELEASE ORAL at 06:17

## 2018-06-30 RX ADMIN — Medication 1 TABLET(S): at 12:23

## 2018-06-30 RX ADMIN — Medication 1 TABLET(S): at 06:17

## 2018-06-30 RX ADMIN — PANTOPRAZOLE SODIUM 40 MILLIGRAM(S): 20 TABLET, DELAYED RELEASE ORAL at 06:17

## 2018-06-30 RX ADMIN — HYDROMORPHONE HYDROCHLORIDE 0.5 MILLIGRAM(S): 2 INJECTION INTRAMUSCULAR; INTRAVENOUS; SUBCUTANEOUS at 06:07

## 2018-06-30 RX ADMIN — Medication 12.5 MILLIGRAM(S): at 17:49

## 2018-06-30 NOTE — PROGRESS NOTE ADULT - SUBJECTIVE AND OBJECTIVE BOX
Patient is a 77y old  Female who presents with a chief complaint of fever, lethargy (07 Jun 2018 22:19)      SUBJECTIVE / OVERNIGHT EVENTS:    Patient seen and examined. feels fatigued today. denies cp sob.      Vital Signs Last 24 Hrs  T(C): 36.7 (29 Jun 2018 22:03), Max: 37.1 (29 Jun 2018 14:27)  T(F): 98.1 (29 Jun 2018 22:03), Max: 98.8 (29 Jun 2018 14:27)  HR: 79 (30 Jun 2018 06:18) (68 - 79)  BP: 106/68 (30 Jun 2018 06:18) (100/61 - 135/65)  BP(mean): --  RR: 16 (29 Jun 2018 22:03) (16 - 18)  SpO2: 96% (29 Jun 2018 22:03) (92% - 96%)  I&O's Summary    30 Jun 2018 07:01  -  30 Jun 2018 08:43  --------------------------------------------------------  IN: 200 mL / OUT: 0 mL / NET: 200 mL        PE:  GENERAL: NAD, AAOx3, obese  HEAD:  Atraumatic, Normocephalic  EYES:  conjunctiva and sclera clear  NECK: Supple, No JVD  CHEST/LUNG: CTABL, No wheeze  HEART: Regular rate and rhythm; no murmur  ABDOMEN: Soft, Nontender, Nondistended; Bowel sounds present, abd vac  EXTREMITIES:  2+ Peripheral Pulses, right thigh and right knee vac, right lateral posterior thigh vac, dressing left medial thigh, right medial thigh  SKIN: No rashes or lesions  NEURO: No focal deficits    LABS:                        8.7    8.29  )-----------( 213      ( 29 Jun 2018 09:44 )             28.6     06-29    141  |  102  |  30<H>  ----------------------------<  109<H>  4.6   |  26  |  0.70    Ca    9.6      29 Jun 2018 07:40      PT/INR - ( 30 Jun 2018 06:43 )   PT: 26.8 sec;   INR: 2.44 ratio         PTT - ( 29 Jun 2018 09:41 )  PTT:45.0 sec  CAPILLARY BLOOD GLUCOSE                RADIOLOGY & ADDITIONAL TESTS:    Imaging Personally Reviewed:  [x] YES  [ ] NO    Consultant(s) Notes Reviewed:  [x] YES  [ ] NO    MEDICATIONS  (STANDING):  ascorbic acid 500 milliGRAM(s) Oral daily  aspirin enteric coated 81 milliGRAM(s) Oral daily  betamethasone valerate 0.1% Ointment 1 Application(s) Topical two times a day  buDESOnide 160 MICROgram(s)/formoterol 4.5 MICROgram(s) Inhaler 2 Puff(s) Inhalation two times a day  calcium carbonate 1250 mG + Vitamin D (OsCal 500 + D) 1 Tablet(s) Oral three times a day  folic acid 1 milliGRAM(s) Oral daily  gabapentin 200 milliGRAM(s) Oral three times a day  loratadine 10 milliGRAM(s) Oral daily  melatonin 3 milliGRAM(s) Oral at bedtime  metoprolol tartrate 12.5 milliGRAM(s) Oral two times a day  minocycline 100 milliGRAM(s) Oral two times a day  multivitamin 1 Tablet(s) Oral daily  ondansetron Injectable 4 milliGRAM(s) IV Push once  pantoprazole    Tablet 40 milliGRAM(s) Oral before breakfast  polyethylene glycol 3350 17 Gram(s) Oral daily  simvastatin 20 milliGRAM(s) Oral at bedtime  tiotropium 18 MICROgram(s) Capsule 1 Capsule(s) Inhalation daily  warfarin 2 milliGRAM(s) Oral once    MEDICATIONS  (PRN):  acetaminophen   Tablet. 650 milliGRAM(s) Oral every 6 hours PRN Mild Pain (1 - 3)  ALPRAZolam 0.25 milliGRAM(s) Oral daily PRN anxiety  bisacodyl Suppository 10 milliGRAM(s) Rectal daily PRN Constipation  bismuth subsalicylate Liquid 30 milliLiter(s) Oral every 6 hours PRN Cramping/abd pain  HYDROmorphone   Tablet 2 milliGRAM(s) Oral every 3 hours PRN Moderate Pain (4 - 6)  HYDROmorphone  Injectable 0.5 milliGRAM(s) IV Push every 4 hours PRN Severe Pain (7 - 10)  senna 2 Tablet(s) Oral at bedtime PRN Constipation  simethicone 80 milliGRAM(s) Chew two times a day PRN Gas      Care Discussed with Consultants/Other Providers [x] YES  [ ] NO    HEALTH ISSUES - PROBLEM Dx:  Urticaria: Urticaria  Joint infection: Joint infection  Other problems related to medical facilities and other health care: Other problems related to medical facilities and other health care  S/P TAVR (transcatheter aortic valve replacement): S/P TAVR (transcatheter aortic valve replacement)  Acute cystitis without hematuria: Acute cystitis without hematuria  Wound infection: Wound infection  Acute appendicitis, unspecified acute appendicitis type: Acute appendicitis, unspecified acute appendicitis type  Fever, unspecified fever cause: Fever, unspecified fever cause

## 2018-06-30 NOTE — PROGRESS NOTE ADULT - ASSESSMENT
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    # acute appendicitis  Completed 10d course Meropenem, resolved    # abd wound, multiple bl leg wounds  continue vac to abdomen, rt posterior thigh and rt Knee, vac changes M/W/F  appreciate wound care recs, cont NS irrigation, cavilon, medihoney  pain control  appreciate derm's detailed note  6/22 left thigh and left arm punch biopsies resulted and show perivascular and slightly interstitial infiltrate of lymphocytes, neutrophils, and eosinophils, consistent with urticaria. appreciate allergy recs. pt currently asymptomatic so will hold off antihistamine.  tissue culture grew coag neg staph which can be normal roopa / colonized    # polymyalgia rheumatica  steroids on hold to allow for wound healing  appreciate rheum recs, no active rheum issues    #TKR wound-mrsa  chr minocycline    # Pain control  dilaudid2 po q4 prn, 0.5 iv prior to dressing change  bowel regimen    # chr Anemia  stable, monitor h/h closely    # S/P TAVR (transcatheter aortic valve replacement)  continue Coumadin, daily INR    # adjustment disorder with depressed mood  appreciate psych re eval  xanax prn

## 2018-07-01 LAB
HCT VFR BLD CALC: 28.5 % — LOW (ref 34.5–45)
HGB BLD-MCNC: 8.5 G/DL — LOW (ref 11.5–15.5)
INR BLD: 1.96 RATIO — HIGH (ref 0.88–1.16)
MCHC RBC-ENTMCNC: 26.3 PG — LOW (ref 27–34)
MCHC RBC-ENTMCNC: 29.8 GM/DL — LOW (ref 32–36)
MCV RBC AUTO: 88.2 FL — SIGNIFICANT CHANGE UP (ref 80–100)
PLATELET # BLD AUTO: 210 K/UL — SIGNIFICANT CHANGE UP (ref 150–400)
PROTHROM AB SERPL-ACNC: 22.4 SEC — HIGH (ref 10–13.1)
RBC # BLD: 3.23 M/UL — LOW (ref 3.8–5.2)
RBC # FLD: 18.4 % — HIGH (ref 10.3–14.5)
WBC # BLD: 7.92 K/UL — SIGNIFICANT CHANGE UP (ref 3.8–10.5)
WBC # FLD AUTO: 7.92 K/UL — SIGNIFICANT CHANGE UP (ref 3.8–10.5)

## 2018-07-01 RX ORDER — WARFARIN SODIUM 2.5 MG/1
2 TABLET ORAL ONCE
Qty: 0 | Refills: 0 | Status: COMPLETED | OUTPATIENT
Start: 2018-07-01 | End: 2018-07-01

## 2018-07-01 RX ORDER — DIPHENHYDRAMINE HCL 50 MG
12.5 CAPSULE ORAL ONCE
Qty: 0 | Refills: 0 | Status: DISCONTINUED | OUTPATIENT
Start: 2018-07-01 | End: 2018-07-01

## 2018-07-01 RX ADMIN — BUDESONIDE AND FORMOTEROL FUMARATE DIHYDRATE 2 PUFF(S): 160; 4.5 AEROSOL RESPIRATORY (INHALATION) at 17:51

## 2018-07-01 RX ADMIN — SIMVASTATIN 20 MILLIGRAM(S): 20 TABLET, FILM COATED ORAL at 21:30

## 2018-07-01 RX ADMIN — Medication 1 APPLICATION(S): at 10:30

## 2018-07-01 RX ADMIN — HYDROMORPHONE HYDROCHLORIDE 0.5 MILLIGRAM(S): 2 INJECTION INTRAMUSCULAR; INTRAVENOUS; SUBCUTANEOUS at 06:58

## 2018-07-01 RX ADMIN — POLYETHYLENE GLYCOL 3350 17 GRAM(S): 17 POWDER, FOR SOLUTION ORAL at 12:33

## 2018-07-01 RX ADMIN — Medication 650 MILLIGRAM(S): at 12:45

## 2018-07-01 RX ADMIN — PANTOPRAZOLE SODIUM 40 MILLIGRAM(S): 20 TABLET, DELAYED RELEASE ORAL at 06:28

## 2018-07-01 RX ADMIN — HYDROMORPHONE HYDROCHLORIDE 0.5 MILLIGRAM(S): 2 INJECTION INTRAMUSCULAR; INTRAVENOUS; SUBCUTANEOUS at 10:26

## 2018-07-01 RX ADMIN — HYDROMORPHONE HYDROCHLORIDE 0.5 MILLIGRAM(S): 2 INJECTION INTRAMUSCULAR; INTRAVENOUS; SUBCUTANEOUS at 21:31

## 2018-07-01 RX ADMIN — Medication 1 TABLET(S): at 11:57

## 2018-07-01 RX ADMIN — Medication 81 MILLIGRAM(S): at 12:32

## 2018-07-01 RX ADMIN — SENNA PLUS 2 TABLET(S): 8.6 TABLET ORAL at 21:30

## 2018-07-01 RX ADMIN — MINOCYCLINE HYDROCHLORIDE 100 MILLIGRAM(S): 45 TABLET, EXTENDED RELEASE ORAL at 17:51

## 2018-07-01 RX ADMIN — Medication 500 MILLIGRAM(S): at 12:32

## 2018-07-01 RX ADMIN — GABAPENTIN 200 MILLIGRAM(S): 400 CAPSULE ORAL at 06:28

## 2018-07-01 RX ADMIN — TIOTROPIUM BROMIDE 1 CAPSULE(S): 18 CAPSULE ORAL; RESPIRATORY (INHALATION) at 12:34

## 2018-07-01 RX ADMIN — Medication 12.5 MILLIGRAM(S): at 17:51

## 2018-07-01 RX ADMIN — HYDROMORPHONE HYDROCHLORIDE 0.5 MILLIGRAM(S): 2 INJECTION INTRAMUSCULAR; INTRAVENOUS; SUBCUTANEOUS at 06:28

## 2018-07-01 RX ADMIN — MINOCYCLINE HYDROCHLORIDE 100 MILLIGRAM(S): 45 TABLET, EXTENDED RELEASE ORAL at 06:30

## 2018-07-01 RX ADMIN — HYDROMORPHONE HYDROCHLORIDE 0.5 MILLIGRAM(S): 2 INJECTION INTRAMUSCULAR; INTRAVENOUS; SUBCUTANEOUS at 22:01

## 2018-07-01 RX ADMIN — BUDESONIDE AND FORMOTEROL FUMARATE DIHYDRATE 2 PUFF(S): 160; 4.5 AEROSOL RESPIRATORY (INHALATION) at 06:30

## 2018-07-01 RX ADMIN — LORATADINE 10 MILLIGRAM(S): 10 TABLET ORAL at 12:33

## 2018-07-01 RX ADMIN — Medication 12.5 MILLIGRAM(S): at 06:28

## 2018-07-01 RX ADMIN — Medication 1 APPLICATION(S): at 21:29

## 2018-07-01 RX ADMIN — Medication 1 TABLET(S): at 12:33

## 2018-07-01 RX ADMIN — Medication 1 TABLET(S): at 21:30

## 2018-07-01 RX ADMIN — GABAPENTIN 200 MILLIGRAM(S): 400 CAPSULE ORAL at 21:30

## 2018-07-01 RX ADMIN — WARFARIN SODIUM 2 MILLIGRAM(S): 2.5 TABLET ORAL at 21:30

## 2018-07-01 RX ADMIN — HYDROMORPHONE HYDROCHLORIDE 0.5 MILLIGRAM(S): 2 INJECTION INTRAMUSCULAR; INTRAVENOUS; SUBCUTANEOUS at 10:45

## 2018-07-01 RX ADMIN — Medication 1 MILLIGRAM(S): at 12:32

## 2018-07-01 RX ADMIN — Medication 0.25 MILLIGRAM(S): at 17:59

## 2018-07-01 RX ADMIN — Medication 3 MILLIGRAM(S): at 21:31

## 2018-07-01 RX ADMIN — GABAPENTIN 200 MILLIGRAM(S): 400 CAPSULE ORAL at 12:33

## 2018-07-01 RX ADMIN — Medication 650 MILLIGRAM(S): at 12:32

## 2018-07-01 RX ADMIN — Medication 1 TABLET(S): at 06:28

## 2018-07-01 NOTE — PROGRESS NOTE ADULT - SUBJECTIVE AND OBJECTIVE BOX
Patient is a 77y old  Female who presents with a chief complaint of fever, lethargy (07 Jun 2018 22:19)      SUBJECTIVE / OVERNIGHT EVENTS:    Patient seen and examined. mild abd discomfort. last bm yesterday. feels like she has to have bm.      Vital Signs Last 24 Hrs  T(C): 36.4 (01 Jul 2018 06:20), Max: 36.8 (30 Jun 2018 23:32)  T(F): 97.6 (01 Jul 2018 06:20), Max: 98.3 (30 Jun 2018 23:32)  HR: 63 (01 Jul 2018 06:20) (63 - 63)  BP: 121/60 (01 Jul 2018 06:20) (93/63 - 121/60)  BP(mean): --  RR: 18 (01 Jul 2018 06:20) (18 - 18)  SpO2: 95% (01 Jul 2018 06:20) (95% - 95%)  I&O's Summary    30 Jun 2018 07:01  -  01 Jul 2018 07:00  --------------------------------------------------------  IN: 500 mL / OUT: 0 mL / NET: 500 mL        PE:  GENERAL: NAD, AAOx3, obese  HEAD:  Atraumatic, Normocephalic  EYES:  conjunctiva and sclera clear  NECK: Supple, No JVD  CHEST/LUNG: CTABL, No wheeze  HEART: Regular rate and rhythm; no murmur  ABDOMEN: Soft, Nontender, Nondistended; Bowel sounds present, abd vac  EXTREMITIES:  2+ Peripheral Pulses, right thigh and right knee vac, right lateral posterior thigh vac, dressing left medial thigh, right medial thigh  SKIN: No rashes or lesions  NEURO: No focal deficits      LABS:                        8.7    8.29  )-----------( 213      ( 29 Jun 2018 09:44 )             28.6           PT/INR - ( 30 Jun 2018 06:43 )   PT: 26.8 sec;   INR: 2.44 ratio         PTT - ( 29 Jun 2018 09:41 )  PTT:45.0 sec  CAPILLARY BLOOD GLUCOSE                RADIOLOGY & ADDITIONAL TESTS:    Imaging Personally Reviewed:  [x] YES  [ ] NO    Consultant(s) Notes Reviewed:  [x] YES  [ ] NO    MEDICATIONS  (STANDING):  ascorbic acid 500 milliGRAM(s) Oral daily  aspirin enteric coated 81 milliGRAM(s) Oral daily  betamethasone valerate 0.1% Ointment 1 Application(s) Topical two times a day  buDESOnide 160 MICROgram(s)/formoterol 4.5 MICROgram(s) Inhaler 2 Puff(s) Inhalation two times a day  calcium carbonate 1250 mG + Vitamin D (OsCal 500 + D) 1 Tablet(s) Oral three times a day  folic acid 1 milliGRAM(s) Oral daily  gabapentin 200 milliGRAM(s) Oral three times a day  loratadine 10 milliGRAM(s) Oral daily  melatonin 3 milliGRAM(s) Oral at bedtime  metoprolol tartrate 12.5 milliGRAM(s) Oral two times a day  minocycline 100 milliGRAM(s) Oral two times a day  multivitamin 1 Tablet(s) Oral daily  ondansetron Injectable 4 milliGRAM(s) IV Push once  pantoprazole    Tablet 40 milliGRAM(s) Oral before breakfast  polyethylene glycol 3350 17 Gram(s) Oral daily  simvastatin 20 milliGRAM(s) Oral at bedtime  tiotropium 18 MICROgram(s) Capsule 1 Capsule(s) Inhalation daily    MEDICATIONS  (PRN):  acetaminophen   Tablet. 650 milliGRAM(s) Oral every 6 hours PRN Mild Pain (1 - 3)  ALPRAZolam 0.25 milliGRAM(s) Oral daily PRN anxiety  bisacodyl Suppository 10 milliGRAM(s) Rectal daily PRN Constipation  bismuth subsalicylate Liquid 30 milliLiter(s) Oral every 6 hours PRN Cramping/abd pain  HYDROmorphone   Tablet 2 milliGRAM(s) Oral every 3 hours PRN Moderate Pain (4 - 6)  HYDROmorphone  Injectable 0.5 milliGRAM(s) IV Push every 4 hours PRN Severe Pain (7 - 10)  senna 2 Tablet(s) Oral at bedtime PRN Constipation  simethicone 80 milliGRAM(s) Chew two times a day PRN Gas      Care Discussed with Consultants/Other Providers [x] YES  [ ] NO    HEALTH ISSUES - PROBLEM Dx:  Urticaria: Urticaria  Joint infection: Joint infection  Other problems related to medical facilities and other health care: Other problems related to medical facilities and other health care  S/P TAVR (transcatheter aortic valve replacement): S/P TAVR (transcatheter aortic valve replacement)  Acute cystitis without hematuria: Acute cystitis without hematuria  Wound infection: Wound infection  Acute appendicitis, unspecified acute appendicitis type: Acute appendicitis, unspecified acute appendicitis type  Fever, unspecified fever cause: Fever, unspecified fever cause

## 2018-07-01 NOTE — PROGRESS NOTE ADULT - ASSESSMENT
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    # acute appendicitis  Completed 10d course Meropenem, resolved    # abd wound, multiple bl leg wounds  continue vac to abdomen, rt posterior thigh and rt Knee, vac changes M/W/F  appreciate wound care recs, cont NS irrigation, cavilon, medihoney  pain control  appreciate derm's detailed note  6/22 left thigh and left arm punch biopsies resulted and show perivascular and slightly interstitial infiltrate of lymphocytes, neutrophils, and eosinophils, consistent with urticaria. appreciate allergy recs. pt currently asymptomatic so will hold off antihistamine  tissue culture grew coag neg staph which can be normal roopa / colonized    # polymyalgia rheumatica  steroids on hold to allow for wound healing  appreciate rheum recs, no active rheum issues    #TKR wound-mrsa  chr minocycline    # Pain control  dilaudid2 po q4 prn, 0.5 iv prior to dressing change  bowel regimen    # chr Anemia  stable, monitor h/h closely    # S/P TAVR (transcatheter aortic valve replacement)  continue Coumadin, daily INR    # adjustment disorder with depressed mood  appreciate psych re eval  xanax prn

## 2018-07-02 LAB
APTT BLD: 44.4 SEC — HIGH (ref 27.5–37.4)
HCT VFR BLD CALC: 29.6 % — LOW (ref 34.5–45)
HGB BLD-MCNC: 8.8 G/DL — LOW (ref 11.5–15.5)
INR BLD: 2.48 RATIO — HIGH (ref 0.88–1.16)
MCHC RBC-ENTMCNC: 26.3 PG — LOW (ref 27–34)
MCHC RBC-ENTMCNC: 29.7 GM/DL — LOW (ref 32–36)
MCV RBC AUTO: 88.4 FL — SIGNIFICANT CHANGE UP (ref 80–100)
PLATELET # BLD AUTO: 204 K/UL — SIGNIFICANT CHANGE UP (ref 150–400)
PROTHROM AB SERPL-ACNC: 28.5 SEC — HIGH (ref 10–13.1)
RBC # BLD: 3.35 M/UL — LOW (ref 3.8–5.2)
RBC # FLD: 18.1 % — HIGH (ref 10.3–14.5)
WBC # BLD: 7.7 K/UL — SIGNIFICANT CHANGE UP (ref 3.8–10.5)
WBC # FLD AUTO: 7.7 K/UL — SIGNIFICANT CHANGE UP (ref 3.8–10.5)

## 2018-07-02 PROCEDURE — 99232 SBSQ HOSP IP/OBS MODERATE 35: CPT

## 2018-07-02 RX ORDER — WARFARIN SODIUM 2.5 MG/1
1 TABLET ORAL ONCE
Qty: 0 | Refills: 0 | Status: COMPLETED | OUTPATIENT
Start: 2018-07-02 | End: 2018-07-02

## 2018-07-02 RX ORDER — POLYETHYLENE GLYCOL 3350 17 G/17G
17 POWDER, FOR SOLUTION ORAL
Qty: 0 | Refills: 0 | Status: DISCONTINUED | OUTPATIENT
Start: 2018-07-02 | End: 2018-08-16

## 2018-07-02 RX ADMIN — Medication 12.5 MILLIGRAM(S): at 17:07

## 2018-07-02 RX ADMIN — HYDROMORPHONE HYDROCHLORIDE 2 MILLIGRAM(S): 2 INJECTION INTRAMUSCULAR; INTRAVENOUS; SUBCUTANEOUS at 10:09

## 2018-07-02 RX ADMIN — HYDROMORPHONE HYDROCHLORIDE 0.5 MILLIGRAM(S): 2 INJECTION INTRAMUSCULAR; INTRAVENOUS; SUBCUTANEOUS at 03:55

## 2018-07-02 RX ADMIN — PANTOPRAZOLE SODIUM 40 MILLIGRAM(S): 20 TABLET, DELAYED RELEASE ORAL at 05:59

## 2018-07-02 RX ADMIN — Medication 650 MILLIGRAM(S): at 06:29

## 2018-07-02 RX ADMIN — HYDROMORPHONE HYDROCHLORIDE 0.5 MILLIGRAM(S): 2 INJECTION INTRAMUSCULAR; INTRAVENOUS; SUBCUTANEOUS at 03:11

## 2018-07-02 RX ADMIN — LORATADINE 10 MILLIGRAM(S): 10 TABLET ORAL at 12:13

## 2018-07-02 RX ADMIN — POLYETHYLENE GLYCOL 3350 17 GRAM(S): 17 POWDER, FOR SOLUTION ORAL at 10:09

## 2018-07-02 RX ADMIN — BUDESONIDE AND FORMOTEROL FUMARATE DIHYDRATE 2 PUFF(S): 160; 4.5 AEROSOL RESPIRATORY (INHALATION) at 06:01

## 2018-07-02 RX ADMIN — SIMVASTATIN 20 MILLIGRAM(S): 20 TABLET, FILM COATED ORAL at 20:59

## 2018-07-02 RX ADMIN — HYDROMORPHONE HYDROCHLORIDE 0.5 MILLIGRAM(S): 2 INJECTION INTRAMUSCULAR; INTRAVENOUS; SUBCUTANEOUS at 19:42

## 2018-07-02 RX ADMIN — Medication 1 TABLET(S): at 12:13

## 2018-07-02 RX ADMIN — MINOCYCLINE HYDROCHLORIDE 100 MILLIGRAM(S): 45 TABLET, EXTENDED RELEASE ORAL at 17:08

## 2018-07-02 RX ADMIN — HYDROMORPHONE HYDROCHLORIDE 0.5 MILLIGRAM(S): 2 INJECTION INTRAMUSCULAR; INTRAVENOUS; SUBCUTANEOUS at 07:59

## 2018-07-02 RX ADMIN — MINOCYCLINE HYDROCHLORIDE 100 MILLIGRAM(S): 45 TABLET, EXTENDED RELEASE ORAL at 06:02

## 2018-07-02 RX ADMIN — Medication 1 TABLET(S): at 20:59

## 2018-07-02 RX ADMIN — HYDROMORPHONE HYDROCHLORIDE 2 MILLIGRAM(S): 2 INJECTION INTRAMUSCULAR; INTRAVENOUS; SUBCUTANEOUS at 10:39

## 2018-07-02 RX ADMIN — Medication 1 APPLICATION(S): at 17:07

## 2018-07-02 RX ADMIN — BUDESONIDE AND FORMOTEROL FUMARATE DIHYDRATE 2 PUFF(S): 160; 4.5 AEROSOL RESPIRATORY (INHALATION) at 17:08

## 2018-07-02 RX ADMIN — GABAPENTIN 200 MILLIGRAM(S): 400 CAPSULE ORAL at 05:59

## 2018-07-02 RX ADMIN — GABAPENTIN 200 MILLIGRAM(S): 400 CAPSULE ORAL at 14:16

## 2018-07-02 RX ADMIN — Medication 1 TABLET(S): at 05:59

## 2018-07-02 RX ADMIN — Medication 650 MILLIGRAM(S): at 05:59

## 2018-07-02 RX ADMIN — Medication 1 APPLICATION(S): at 06:02

## 2018-07-02 RX ADMIN — Medication 500 MILLIGRAM(S): at 12:13

## 2018-07-02 RX ADMIN — HYDROMORPHONE HYDROCHLORIDE 0.5 MILLIGRAM(S): 2 INJECTION INTRAMUSCULAR; INTRAVENOUS; SUBCUTANEOUS at 12:32

## 2018-07-02 RX ADMIN — Medication 1 MILLIGRAM(S): at 12:13

## 2018-07-02 RX ADMIN — Medication 3 MILLIGRAM(S): at 21:00

## 2018-07-02 RX ADMIN — HYDROMORPHONE HYDROCHLORIDE 0.5 MILLIGRAM(S): 2 INJECTION INTRAMUSCULAR; INTRAVENOUS; SUBCUTANEOUS at 08:15

## 2018-07-02 RX ADMIN — Medication 1 TABLET(S): at 14:16

## 2018-07-02 RX ADMIN — SENNA PLUS 2 TABLET(S): 8.6 TABLET ORAL at 23:05

## 2018-07-02 RX ADMIN — GABAPENTIN 200 MILLIGRAM(S): 400 CAPSULE ORAL at 21:00

## 2018-07-02 RX ADMIN — TIOTROPIUM BROMIDE 1 CAPSULE(S): 18 CAPSULE ORAL; RESPIRATORY (INHALATION) at 12:14

## 2018-07-02 RX ADMIN — HYDROMORPHONE HYDROCHLORIDE 0.5 MILLIGRAM(S): 2 INJECTION INTRAMUSCULAR; INTRAVENOUS; SUBCUTANEOUS at 20:12

## 2018-07-02 RX ADMIN — POLYETHYLENE GLYCOL 3350 17 GRAM(S): 17 POWDER, FOR SOLUTION ORAL at 17:08

## 2018-07-02 RX ADMIN — WARFARIN SODIUM 1 MILLIGRAM(S): 2.5 TABLET ORAL at 20:59

## 2018-07-02 RX ADMIN — Medication 10 MILLIGRAM(S): at 18:16

## 2018-07-02 RX ADMIN — HYDROMORPHONE HYDROCHLORIDE 0.5 MILLIGRAM(S): 2 INJECTION INTRAMUSCULAR; INTRAVENOUS; SUBCUTANEOUS at 12:16

## 2018-07-02 RX ADMIN — Medication 12.5 MILLIGRAM(S): at 05:59

## 2018-07-02 RX ADMIN — HYDROMORPHONE HYDROCHLORIDE 2 MILLIGRAM(S): 2 INJECTION INTRAMUSCULAR; INTRAVENOUS; SUBCUTANEOUS at 23:05

## 2018-07-02 RX ADMIN — HYDROMORPHONE HYDROCHLORIDE 2 MILLIGRAM(S): 2 INJECTION INTRAMUSCULAR; INTRAVENOUS; SUBCUTANEOUS at 23:35

## 2018-07-02 RX ADMIN — Medication 81 MILLIGRAM(S): at 12:13

## 2018-07-02 NOTE — PROGRESS NOTE ADULT - ASSESSMENT
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 completed 10 days meropenem.    # abd wound, multiple bl leg wounds  continue vac to abdomen, rt posterior thigh and rt Knee, vac changes M/W/F  appreciate wound care recs, cont NS irrigation, cavilon, medihoney  pain control  appreciate derm recs  6/22 left thigh and left arm punch biopsies resulted and consistent with urticaria. appreciate allergy recs. pt currently asymptomatic so will hold off antihistamine  tissue culture grew coag neg staph which can be normal roopa / colonized  there is no further derm workup inpatient    # polymyalgia rheumatica  steroids on hold to allow for wound healing  appreciate rheum recs, no active rheum issues    #TKR wound-mrsa  chr minocycline    # Pain control  dilaudid2 po q4 prn, 0.5 iv prior to dressing change  bowel regimen    # chr Anemia  stable, monitor h/h closely    # S/P TAVR (transcatheter aortic valve replacement)  continue Coumadin, daily INR    # adjustment disorder with depressed mood  appreciate psych re eval  xanax prn    dispo: after multiple discussions with dtr and , family continues to have unrealistic expectation regarding discharge. they wish for wounds to fully heal before leaving the hospital as they feel other facilities can not provide the same caliber wound care. 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 completed 10 days meropenem.    # abd wound, multiple bl leg wounds  continue vac to abdomen, rt posterior thigh and rt Knee, vac changes M/W/F  appreciate wound care recs, cont NS irrigation, cavilon, medihoney  pain control  appreciate derm recs  6/22 left thigh and left arm punch biopsies resulted and consistent with urticaria. appreciate allergy recs. pt currently asymptomatic so will hold off antihistamine  tissue culture grew coag neg staph which can be normal roopa / colonized  there is no further derm workup inpatient    # polymyalgia rheumatica  steroids on hold to allow for wound healing  appreciate rheum recs, no active rheum issues    #TKR wound-mrsa  chr minocycline    # Pain control  dilaudid2 po q4 prn, 0.5 iv prior to dressing change  bowel regimen    # chr Anemia  stable, monitor h/h closely    # S/P TAVR (transcatheter aortic valve replacement)  continue Coumadin, daily INR    # adjustment disorder with depressed mood  xanax prn    # constipation  increase miralax    dispo: after multiple discussions with dtr and , family continues to have unrealistic expectation regarding discharge. they wish for wounds to fully heal before leaving the hospital as they feel other facilities can not provide the same caliber wound care.  I have discussed discharge with Mr. Rdz today. he states "I will michael you if you discharge my wife. I will michael you if you give her 24 hr notice."  he states he will not take his wife out of the hospital until her wounds are healed. Mr Rdz is on the foundation and he states he will stop his donations to the hospital if we discharge his wife. He states he has pictures and believes the newspapers and his  would not be happy to see them. He states that Dr. Day said they have to run more test. He believes plastics did not clear her for discharge. 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 completed 10 days meropenem.    # abd wound, multiple bl leg wounds  continue vac to abdomen, rt posterior thigh and rt Knee, vac changes M/W/F  appreciate wound care recs, cont NS irrigation, cavilon, medihoney  pain control  appreciate derm recs  6/22 left thigh and left arm punch biopsies resulted and consistent with urticaria. appreciate allergy recs. pt currently asymptomatic so will hold off antihistamine  tissue culture grew coag neg staph which can be normal roopa / colonized  there is no further derm workup inpatient    # polymyalgia rheumatica  steroids on hold to allow for wound healing  appreciate rheum recs, no active rheum issues    #TKR wound-mrsa  chr minocycline    # Pain control  dilaudid2 po q4 prn, 0.5 iv prior to dressing change  bowel regimen    # chr Anemia  stable, monitor h/h closely    # S/P TAVR (transcatheter aortic valve replacement)  continue Coumadin, daily INR    # adjustment disorder with depressed mood  xanax prn    # constipation  increase miralax    dispo: after multiple discussions with dtr and , family continues to have unrealistic expectation regarding discharge. they wish for wounds to fully heal before leaving the hospital as they feel other facilities can not provide the same caliber wound care.  I have discussed discharge with Mr. Rdz today. he states "I will michael you if you discharge my wife. I will michael you if you give her 24 hr notice."  he states he will not take his wife out of the hospital until her wounds are healed. Mr Rdz is on the foundation and he states he will stop his donations to the hospital if we discharge his wife. He states he has pictures and believes the newspapers and his  would not be happy to see them. He states that Dr. Day said they have to run more test. He believes plastics did not clear her for discharge.    awaiting clearance for discharge from all subspecialist following  will speak with patient relations

## 2018-07-02 NOTE — PROGRESS NOTE ADULT - SUBJECTIVE AND OBJECTIVE BOX
S: No acute events overnight, pt seen and examined.    T(C): 36.6 (07-01-18 @ 19:37), Max: 36.7 (07-01-18 @ 15:08)  HR: 67 (07-01-18 @ 19:37) (64 - 70)  BP: 110/66 (07-01-18 @ 19:37) (95/53 - 110/66)  RR: 18 (07-01-18 @ 19:37) (18 - 18)  SpO2: 97% (07-01-18 @ 19:37) (93% - 97%)  Wt(kg): --  07-01 @ 07:01  -  07-02 @ 07:00  --------------------------------------------------------  IN:    Oral Fluid: 800 mL  Total IN: 800 mL    OUT:  Total OUT: 0 mL    Total NET: 800 mL        ** PHYSICAL EXAM **    -- CONSTITUTIONAL: Alert, NAD  -- ABD: central abd VAC intact set to suction  -- Ext: R thigh VAC intact holding suction, Right knee VAC w/ black foam in place holding suction      LABS:       pending.

## 2018-07-02 NOTE — PROGRESS NOTE ADULT - SUBJECTIVE AND OBJECTIVE BOX
Patient is a 77y old  Female who presents with a chief complaint of fever, lethargy (07 Jun 2018 22:19)      SUBJECTIVE / OVERNIGHT EVENTS:    Patient seen and examined. co constipation for 2 days. denies cp sob nvd. denies abd pain.      Vital Signs Last 24 Hrs  T(C): 36.6 (01 Jul 2018 19:37), Max: 36.7 (01 Jul 2018 15:08)  T(F): 97.9 (01 Jul 2018 19:37), Max: 98 (01 Jul 2018 15:08)  HR: 67 (01 Jul 2018 19:37) (67 - 70)  BP: 110/66 (01 Jul 2018 19:37) (95/53 - 110/66)  BP(mean): --  RR: 18 (01 Jul 2018 19:37) (18 - 18)  SpO2: 97% (01 Jul 2018 19:37) (93% - 97%)  I&O's Summary    01 Jul 2018 07:01  -  02 Jul 2018 07:00  --------------------------------------------------------  IN: 800 mL / OUT: 0 mL / NET: 800 mL        PE:  GENERAL: NAD, AAOx3, obese  HEAD:  Atraumatic, Normocephalic  EYES:  conjunctiva and sclera clear  NECK: Supple, No JVD  CHEST/LUNG: CTABL, No wheeze  HEART: Regular rate and rhythm; no murmur  ABDOMEN: Soft, Nontender, Nondistended; Bowel sounds present, abd vac  EXTREMITIES:  2+ Peripheral Pulses, right thigh and right knee vac, right lateral posterior thigh vac, dressing left medial thigh, right medial thigh  SKIN: No rashes or lesions  NEURO: No focal deficits    LABS:                        8.5    7.92  )-----------( 210      ( 01 Jul 2018 08:25 )             28.5           PT/INR - ( 02 Jul 2018 08:09 )   PT: 28.5 sec;   INR: 2.48 ratio         PTT - ( 02 Jul 2018 08:09 )  PTT:44.4 sec  CAPILLARY BLOOD GLUCOSE                RADIOLOGY & ADDITIONAL TESTS:    Imaging Personally Reviewed:  [x] YES  [ ] NO    Consultant(s) Notes Reviewed:  [x] YES  [ ] NO    MEDICATIONS  (STANDING):  ascorbic acid 500 milliGRAM(s) Oral daily  aspirin enteric coated 81 milliGRAM(s) Oral daily  betamethasone valerate 0.1% Ointment 1 Application(s) Topical two times a day  buDESOnide 160 MICROgram(s)/formoterol 4.5 MICROgram(s) Inhaler 2 Puff(s) Inhalation two times a day  calcium carbonate 1250 mG + Vitamin D (OsCal 500 + D) 1 Tablet(s) Oral three times a day  folic acid 1 milliGRAM(s) Oral daily  gabapentin 200 milliGRAM(s) Oral three times a day  loratadine 10 milliGRAM(s) Oral daily  melatonin 3 milliGRAM(s) Oral at bedtime  metoprolol tartrate 12.5 milliGRAM(s) Oral two times a day  minocycline 100 milliGRAM(s) Oral two times a day  multivitamin 1 Tablet(s) Oral daily  ondansetron Injectable 4 milliGRAM(s) IV Push once  pantoprazole    Tablet 40 milliGRAM(s) Oral before breakfast  polyethylene glycol 3350 17 Gram(s) Oral daily  simvastatin 20 milliGRAM(s) Oral at bedtime  tiotropium 18 MICROgram(s) Capsule 1 Capsule(s) Inhalation daily    MEDICATIONS  (PRN):  acetaminophen   Tablet. 650 milliGRAM(s) Oral every 6 hours PRN Mild Pain (1 - 3)  bisacodyl Suppository 10 milliGRAM(s) Rectal daily PRN Constipation  bismuth subsalicylate Liquid 30 milliLiter(s) Oral every 6 hours PRN Cramping/abd pain  HYDROmorphone   Tablet 2 milliGRAM(s) Oral every 3 hours PRN Moderate Pain (4 - 6)  HYDROmorphone  Injectable 0.5 milliGRAM(s) IV Push every 4 hours PRN Severe Pain (7 - 10)  senna 2 Tablet(s) Oral at bedtime PRN Constipation  simethicone 80 milliGRAM(s) Chew two times a day PRN Gas      Care Discussed with Consultants/Other Providers [x] YES  [ ] NO    HEALTH ISSUES - PROBLEM Dx:  Urticaria: Urticaria  Joint infection: Joint infection  Other problems related to medical facilities and other health care: Other problems related to medical facilities and other health care  S/P TAVR (transcatheter aortic valve replacement): S/P TAVR (transcatheter aortic valve replacement)  Acute cystitis without hematuria: Acute cystitis without hematuria  Wound infection: Wound infection  Acute appendicitis, unspecified acute appendicitis type: Acute appendicitis, unspecified acute appendicitis type  Fever, unspecified fever cause: Fever, unspecified fever cause

## 2018-07-02 NOTE — CHART NOTE - NSCHARTNOTEFT_GEN_A_CORE
Pt cleared for discharge from the plastic surgery standpoint.  Will need to continue wound vacs.    Jose Cash  Plastic Surgery Resident  [phone]

## 2018-07-02 NOTE — CHART NOTE - NSCHARTNOTEFT_GEN_A_CORE
Malnutrition Follow Up     Pt admitted c acute appendicitis (completed antibiotics), abdominal wound and multiple luann. leg wounds, noted wound VAC continues to be in place, noted Dermatology following. Interim events noted.     Source: Patient [x ]    Family [ ]     other [ x] Aide at bedside    Diet : Regular diet with nepro 2 x daily and james 2 x daily       Patient reports constipation, stated her last BM was 2 days ago, noted with BM 7/1, pt stated RN is aware, pt ordered for bowel regimen      PO intake:  < 50% [x ] : Pt reports ongoing decreased appetite, estimates consuming 25% of meals, per flowsheets pt taking 50% of meal, pt had coffee and muffin from outside this morning, plans to save cream of wheat for later today. Pt takes 1-2 nepro daily and usually 1 james daily, pt will snack on cheese which she enjoys.         Current Weight: 220.6 lbs (6/7), 215.3 lbs (6/27), wt slightly decreased from admission   % Weight Change    Pertinent Medications: MEDICATIONS  (STANDING):  ascorbic acid 500 milliGRAM(s) Oral daily  aspirin enteric coated 81 milliGRAM(s) Oral daily  betamethasone valerate 0.1% Ointment 1 Application(s) Topical two times a day  buDESOnide 160 MICROgram(s)/formoterol 4.5 MICROgram(s) Inhaler 2 Puff(s) Inhalation two times a day  calcium carbonate 1250 mG + Vitamin D (OsCal 500 + D) 1 Tablet(s) Oral three times a day  folic acid 1 milliGRAM(s) Oral daily  gabapentin 200 milliGRAM(s) Oral three times a day  loratadine 10 milliGRAM(s) Oral daily  melatonin 3 milliGRAM(s) Oral at bedtime  metoprolol tartrate 12.5 milliGRAM(s) Oral two times a day  minocycline 100 milliGRAM(s) Oral two times a day  multivitamin 1 Tablet(s) Oral daily  ondansetron Injectable 4 milliGRAM(s) IV Push once  pantoprazole    Tablet 40 milliGRAM(s) Oral before breakfast  polyethylene glycol 3350 17 Gram(s) Oral two times a day  simvastatin 20 milliGRAM(s) Oral at bedtime  tiotropium 18 MICROgram(s) Capsule 1 Capsule(s) Inhalation daily    MEDICATIONS  (PRN):  acetaminophen   Tablet. 650 milliGRAM(s) Oral every 6 hours PRN Mild Pain (1 - 3)  bisacodyl Suppository 10 milliGRAM(s) Rectal daily PRN Constipation  bismuth subsalicylate Liquid 30 milliLiter(s) Oral every 6 hours PRN Cramping/abd pain  HYDROmorphone   Tablet 2 milliGRAM(s) Oral every 3 hours PRN Moderate Pain (4 - 6)  HYDROmorphone  Injectable 0.5 milliGRAM(s) IV Push every 4 hours PRN Severe Pain (7 - 10)  senna 2 Tablet(s) Oral at bedtime PRN Constipation  simethicone 80 milliGRAM(s) Chew two times a day PRN Gas    Pertinent Labs:  Reviwed      Skin: 3+ luann leg edema    Estimated Needs:   [ x] no change since previous assessment  [ ] recalculated:       Previous Nutrition Diagnosis:      [ x] Malnutrition (moderate), ongoing-addressed with supplements and food preferences  [x] Obesity class III, ongoing-weight management education not appropriate at this time           New Nutrition Diagnosis: [ x] not applicable         Interventions:     Recommend    1. Continue diet as ordered  2. Continue to encourage po intake and obtain/honor food preferences as able, will continue to provide cheeses which patient enjoys, RD reviewed importance of adequate po intake with emphasis on protein foods first.        Monitoring and Evaluation:     [ x] PO intake [ x] Tolerance to diet prescription [x ] weights [ x] follow up per protocol    [ ] other:

## 2018-07-02 NOTE — PROGRESS NOTE ADULT - ASSESSMENT
A/P: s/p Right total knee insertion of spacer, irrigation and debridement, complex wound closure 3/23; readmitted 6/8 for appendicitis  - cont vac change to R knee, thigh, and abdomen M/W/F  - remainder of wounds per wound care/derm  - will cont to follow  - dispo per primary    Plastic Surgery (pg [facility]: [id number], NS: [phone])

## 2018-07-02 NOTE — PROGRESS NOTE ADULT - SUBJECTIVE AND OBJECTIVE BOX
pain controlled, no pain today    abdominal wound - vac  RLE  thigh - dressing per wound care teams, lateral wound vac  knee wound with vac   remainder of wounds dressed by wound care  5/5 ta/ehl/gcs  silt l4-s1  2+ dp pulse    ROS: depressed

## 2018-07-02 NOTE — PROGRESS NOTE ADULT - ASSESSMENT
no plan for orthopedic surgical intervention per family and patient preference  vac and wound care per PRS and wound care teams  PO abx per ID team, minocycline  encourage patient to spend majority of bed oob in bedside chair as able, has not been oob recently  PT to help mobilize, she must remain 50% wb on the RLE and NO KNEE MOTION allowed, knee immobilizer if oob  coumadin, inr goal 2-3  continue to optimize nutrition  continue to involve psych  derm, rhem w/u for leg lesions felt to be of unclear etiology  while orthopedic issues are stable for dispo, please make sure patient and family is okay with discharge before initiating planning process    **PLEASE OBTAIN A NEW XR R KNEE TO REEVALUATE ORTHOPEDIC HARDWARE    In addition to seeing Ms. Rdz, I had multiple prolonged conversations today with Mr. Rdz as well as the patient's son, Thony over the telephone. We discussed the multiple body wounds, her appendicitis (now resolved), and chronic suppression of the RLE PJI. We discussed that I would have to defer to both PRS and wound care regarding the wound care management and clearance for discharge from this perspective to these two services but as far as the R knee is concerned, she has shown no evidence of recurrence of the PJI and the wound continues to heal via secondary intention with the wound vac currently in place. However, Mr. Rdz is uncomfortable with the plan to discharge Ms. Rdz to any rehab as he does not trust other wound care services to manage the wounds appropriately. Similar to as documented in the medicine note, Mr. Rdz is apprehensive about any discharge plan until the wounds are made further progress towards healing and was similarly threatening regarding premature discharge should any wounds not heal 100% as expected or if any other adverse events occur. I also spoke with Dr. Mg regarding the wound care progress and he is pleased with her progress for wound coverage with a plan to continue towards secondary intention wound healing. No e/o infection from his perspective either and as documented and spoken with the family about multiple times, these wounds will take several months for full healing and reepithelization as they are healing via secondary intention. 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

## 2018-07-03 LAB
ANION GAP SERPL CALC-SCNC: 13 MMOL/L — SIGNIFICANT CHANGE UP (ref 5–17)
APTT BLD: 48.6 SEC — HIGH (ref 27.5–37.4)
BUN SERPL-MCNC: 29 MG/DL — HIGH (ref 7–23)
CALCIUM SERPL-MCNC: 9.4 MG/DL — SIGNIFICANT CHANGE UP (ref 8.4–10.5)
CHLORIDE SERPL-SCNC: 101 MMOL/L — SIGNIFICANT CHANGE UP (ref 96–108)
CO2 SERPL-SCNC: 26 MMOL/L — SIGNIFICANT CHANGE UP (ref 22–31)
CREAT SERPL-MCNC: 0.73 MG/DL — SIGNIFICANT CHANGE UP (ref 0.5–1.3)
GLUCOSE SERPL-MCNC: 105 MG/DL — HIGH (ref 70–99)
HCT VFR BLD CALC: 30.1 % — LOW (ref 34.5–45)
HGB BLD-MCNC: 9 G/DL — LOW (ref 11.5–15.5)
INR BLD: 3.42 RATIO — HIGH (ref 0.88–1.16)
MCHC RBC-ENTMCNC: 26.4 PG — LOW (ref 27–34)
MCHC RBC-ENTMCNC: 29.9 GM/DL — LOW (ref 32–36)
MCV RBC AUTO: 88.3 FL — SIGNIFICANT CHANGE UP (ref 80–100)
PLATELET # BLD AUTO: 220 K/UL — SIGNIFICANT CHANGE UP (ref 150–400)
POTASSIUM SERPL-MCNC: 4.3 MMOL/L — SIGNIFICANT CHANGE UP (ref 3.5–5.3)
POTASSIUM SERPL-SCNC: 4.3 MMOL/L — SIGNIFICANT CHANGE UP (ref 3.5–5.3)
PROTHROM AB SERPL-ACNC: 37.9 SEC — HIGH (ref 9.8–12.7)
RBC # BLD: 3.41 M/UL — LOW (ref 3.8–5.2)
RBC # FLD: 18 % — HIGH (ref 10.3–14.5)
SODIUM SERPL-SCNC: 140 MMOL/L — SIGNIFICANT CHANGE UP (ref 135–145)
WBC # BLD: 7.6 K/UL — SIGNIFICANT CHANGE UP (ref 3.8–10.5)
WBC # FLD AUTO: 7.6 K/UL — SIGNIFICANT CHANGE UP (ref 3.8–10.5)

## 2018-07-03 PROCEDURE — 73560 X-RAY EXAM OF KNEE 1 OR 2: CPT | Mod: 26,RT

## 2018-07-03 PROCEDURE — 97605 NEG PRS WND THER DME<=50SQCM: CPT | Mod: 59

## 2018-07-03 PROCEDURE — 99232 SBSQ HOSP IP/OBS MODERATE 35: CPT | Mod: 25

## 2018-07-03 PROCEDURE — 97597 DBRDMT OPN WND 1ST 20 CM/<: CPT

## 2018-07-03 RX ORDER — WARFARIN SODIUM 2.5 MG/1
1 TABLET ORAL ONCE
Qty: 0 | Refills: 0 | Status: COMPLETED | OUTPATIENT
Start: 2018-07-03 | End: 2018-07-03

## 2018-07-03 RX ORDER — ACETAMINOPHEN 500 MG
1000 TABLET ORAL ONCE
Qty: 0 | Refills: 0 | Status: COMPLETED | OUTPATIENT
Start: 2018-07-03 | End: 2018-07-03

## 2018-07-03 RX ORDER — HYDROMORPHONE HYDROCHLORIDE 2 MG/ML
0.5 INJECTION INTRAMUSCULAR; INTRAVENOUS; SUBCUTANEOUS EVERY 4 HOURS
Qty: 0 | Refills: 0 | Status: DISCONTINUED | OUTPATIENT
Start: 2018-07-03 | End: 2018-07-10

## 2018-07-03 RX ORDER — HYDROMORPHONE HYDROCHLORIDE 2 MG/ML
2 INJECTION INTRAMUSCULAR; INTRAVENOUS; SUBCUTANEOUS
Qty: 0 | Refills: 0 | Status: DISCONTINUED | OUTPATIENT
Start: 2018-07-03 | End: 2018-07-10

## 2018-07-03 RX ORDER — MAGNESIUM HYDROXIDE 400 MG/1
30 TABLET, CHEWABLE ORAL DAILY
Qty: 0 | Refills: 0 | Status: DISCONTINUED | OUTPATIENT
Start: 2018-07-03 | End: 2018-08-16

## 2018-07-03 RX ADMIN — GABAPENTIN 200 MILLIGRAM(S): 400 CAPSULE ORAL at 06:51

## 2018-07-03 RX ADMIN — Medication 1 APPLICATION(S): at 06:51

## 2018-07-03 RX ADMIN — Medication 1000 MILLIGRAM(S): at 02:05

## 2018-07-03 RX ADMIN — LORATADINE 10 MILLIGRAM(S): 10 TABLET ORAL at 13:06

## 2018-07-03 RX ADMIN — BUDESONIDE AND FORMOTEROL FUMARATE DIHYDRATE 2 PUFF(S): 160; 4.5 AEROSOL RESPIRATORY (INHALATION) at 17:05

## 2018-07-03 RX ADMIN — Medication 12.5 MILLIGRAM(S): at 17:06

## 2018-07-03 RX ADMIN — MINOCYCLINE HYDROCHLORIDE 100 MILLIGRAM(S): 45 TABLET, EXTENDED RELEASE ORAL at 06:51

## 2018-07-03 RX ADMIN — POLYETHYLENE GLYCOL 3350 17 GRAM(S): 17 POWDER, FOR SOLUTION ORAL at 17:05

## 2018-07-03 RX ADMIN — TIOTROPIUM BROMIDE 1 CAPSULE(S): 18 CAPSULE ORAL; RESPIRATORY (INHALATION) at 13:06

## 2018-07-03 RX ADMIN — PANTOPRAZOLE SODIUM 40 MILLIGRAM(S): 20 TABLET, DELAYED RELEASE ORAL at 06:51

## 2018-07-03 RX ADMIN — Medication 81 MILLIGRAM(S): at 13:06

## 2018-07-03 RX ADMIN — Medication 1 TABLET(S): at 13:06

## 2018-07-03 RX ADMIN — Medication 500 MILLIGRAM(S): at 13:06

## 2018-07-03 RX ADMIN — Medication 1 TABLET(S): at 22:10

## 2018-07-03 RX ADMIN — Medication 1 TABLET(S): at 13:05

## 2018-07-03 RX ADMIN — MAGNESIUM HYDROXIDE 30 MILLILITER(S): 400 TABLET, CHEWABLE ORAL at 11:03

## 2018-07-03 RX ADMIN — HYDROMORPHONE HYDROCHLORIDE 0.5 MILLIGRAM(S): 2 INJECTION INTRAMUSCULAR; INTRAVENOUS; SUBCUTANEOUS at 18:32

## 2018-07-03 RX ADMIN — Medication 1 MILLIGRAM(S): at 13:06

## 2018-07-03 RX ADMIN — HYDROMORPHONE HYDROCHLORIDE 0.5 MILLIGRAM(S): 2 INJECTION INTRAMUSCULAR; INTRAVENOUS; SUBCUTANEOUS at 18:17

## 2018-07-03 RX ADMIN — MINOCYCLINE HYDROCHLORIDE 100 MILLIGRAM(S): 45 TABLET, EXTENDED RELEASE ORAL at 17:05

## 2018-07-03 RX ADMIN — POLYETHYLENE GLYCOL 3350 17 GRAM(S): 17 POWDER, FOR SOLUTION ORAL at 06:51

## 2018-07-03 RX ADMIN — Medication 400 MILLIGRAM(S): at 01:34

## 2018-07-03 RX ADMIN — Medication 3 MILLIGRAM(S): at 22:09

## 2018-07-03 RX ADMIN — Medication 1 APPLICATION(S): at 17:05

## 2018-07-03 RX ADMIN — SIMVASTATIN 20 MILLIGRAM(S): 20 TABLET, FILM COATED ORAL at 22:09

## 2018-07-03 RX ADMIN — WARFARIN SODIUM 1 MILLIGRAM(S): 2.5 TABLET ORAL at 22:54

## 2018-07-03 RX ADMIN — GABAPENTIN 200 MILLIGRAM(S): 400 CAPSULE ORAL at 22:10

## 2018-07-03 RX ADMIN — BUDESONIDE AND FORMOTEROL FUMARATE DIHYDRATE 2 PUFF(S): 160; 4.5 AEROSOL RESPIRATORY (INHALATION) at 06:50

## 2018-07-03 RX ADMIN — Medication 12.5 MILLIGRAM(S): at 06:51

## 2018-07-03 RX ADMIN — HYDROMORPHONE HYDROCHLORIDE 0.5 MILLIGRAM(S): 2 INJECTION INTRAMUSCULAR; INTRAVENOUS; SUBCUTANEOUS at 11:03

## 2018-07-03 RX ADMIN — Medication 1 TABLET(S): at 06:51

## 2018-07-03 RX ADMIN — GABAPENTIN 200 MILLIGRAM(S): 400 CAPSULE ORAL at 13:06

## 2018-07-03 NOTE — PROGRESS NOTE ADULT - SUBJECTIVE AND OBJECTIVE BOX
SUBJECTIVE: Pt seen, chart reviewed.    Pt's daughter & HHA at bedside.  Pt was premedicated.  continued pain  Pt overall improving otherwise- abx for appendicitis  no definitive dx for ulcers.  needs f/u w/ rheum/ derm/ maybe hem/onc for spontaneous necrotic hemorraghic wounds.    No odor, redness, warmth, f/c/s noted  drainage managed by dressings	    ROS skin / msk see HPI   all other systems negative      aspirin enteric coated 81 milliGRAM(s) Oral daily  minocycline 100 milliGRAM(s) Oral two times a day      Physical Exam:  Vital Signs Last 24 Hrs  T(C): 37.4 (03 Jul 2018 15:26), Max: 37.4 (03 Jul 2018 15:26)  T(F): 99.4 (03 Jul 2018 15:26), Max: 99.4 (03 Jul 2018 15:26)  HR: 86 (03 Jul 2018 17:06) (85 - 98)  BP: 115/74 (03 Jul 2018 17:06) (102/67 - 131/81)  BP(mean): --  RR: 18 (03 Jul 2018 15:26) (18 - 18)  SpO2: 95% (03 Jul 2018 15:26) (95% - 98%)    General Appearance:    NAD /  A&Ox3  MO/ frail/ Disheveled   Envella    Musculoskeletal/Vascular:  BLE edema  BLE equally warm no acute ischemia noted  no deformities/ contractures  Rt knee wound per plastics    Skin:  dry, frail,  ecchymosis w/o hematoma    pt is tender when just gently touching skin- therefore pt was premedicated prior to assessment    Multiple wounds- see measurements in A&I sections- placed by wound PT    RLQ abdomen wound w/ moist & granular tissue no odor, kimber     Rt anterior thigh wound w/ moist & granular tissue    Rt superior posterolateral w/ new upper thigh small wound opened w/serosanginous drainage    Rt inferior posterolateral wound moist & granular tissue w/ nonviable tissue on inferior wall of dermis    (+) serosanguinous drainage & tender w/o odor  No erythema, increased warmth, induration, fluctuance    Procedure Note Rt thigh w/ nonviable tissue & Lt lateral thigh wounds w/ eschar on wound edge  Using aseptic technique abdominal wall wound was excisionally debrided using a scissor and forceps through nonviable dermis wound is down to subcutaneous & adipose tissue.  Pt tolerated procedure well.  Hemostasis was maintained throughout.      Lt lateral thigh wound  3cm x 1cm x 3.5cm w/ necrotic dermis  full thickness ulcers w/ moist & granular tissue   (+)serosanguinous  drainage  (+)tender w/o malodor  No erythema, increased warmth, induration, fluctuance    Lt lateral posterior thigh (inferior) superficial w/ 2 small partial thickness  fibrinous exudate  in 2cm x 1cm x 0.1cm  (+)serosanguinous drainage & tender  No erythema,  odor, increased warmth, induration, fluctuance    Lt  Upper medial inner thigh   3 wounds/ 2 partial thickness & 1 full thickness   moist &granular w/ fibrinous exudate  (+)serosanguinous drainage  (+)tender w/o odor  No erythema, increased warmth, induration, fluctuance      LABS:                        9.0    7.60  )-----------( 220      ( 03 Jul 2018 09:42 )             30.1     PT/INR - ( 02 Jul 2018 08:09 )   PT: 28.5 sec;   INR: 2.48 ratio    PTT - ( 03 Jul 2018 15:22 )  PTT:48.6 sec    RADIOLOGY & ADDITIONAL STUDIES:    < from: Xray Knee 1 or 2 Views, Right (07.03.18 @ 12:29) >  FINDINGS:    Patient status post arthrodesis. There is an intramedullary ruthann running   from femur to tibia with surrounding radiopaque cement. Hardware is   intact. No periprosthetic lucency or fracture. Noacute fracture. The   patella is less discretely identified than on prior examination. No   dislocation. There is calcification of arteries in the leg.  No soft   tissue swelling. SUBJECTIVE: Pt seen, chart reviewed.    Pt's daughter & HHA at bedside.  Pt was premedicated.  continued pain  Pt overall improving otherwise- abx for appendicitis  no definitive dx for ulcers.  needs f/u w/ rheum/ derm/ maybe hem/onc for spontaneous necrotic hemorraghic wounds.    No odor, redness, warmth, f/c/s noted  drainage managed by dressings	  wounds improved    ROS skin / msk see HPI   all other systems negative      aspirin enteric coated 81 milliGRAM(s) Oral daily  minocycline 100 milliGRAM(s) Oral two times a day      Physical Exam:  Vital Signs Last 24 Hrs  T(C): 37.4 (03 Jul 2018 15:26), Max: 37.4 (03 Jul 2018 15:26)  T(F): 99.4 (03 Jul 2018 15:26), Max: 99.4 (03 Jul 2018 15:26)  HR: 86 (03 Jul 2018 17:06) (85 - 98)  BP: 115/74 (03 Jul 2018 17:06) (102/67 - 131/81)  BP(mean): --  RR: 18 (03 Jul 2018 15:26) (18 - 18)  SpO2: 95% (03 Jul 2018 15:26) (95% - 98%)    General Appearance:    NAD /  A&Ox3  MO/ frail/ Disheveled   Envella    Musculoskeletal/Vascular:  BLE edema  BLE equally warm no acute ischemia noted  no deformities/ contractures  Rt knee wound per plastics    Skin:  dry, frail,  ecchymosis w/o hematoma    pt is tender when just gently touching skin- therefore pt was premedicated prior to assessment    Multiple wounds- see measurements in A&I sections- placed by wound PT    RLQ abdomen wound w/ moist & granular tissue no odor, kimber     Rt anterior thigh wound w/ moist & granular tissue    Rt superior posterolateral w/ new upper thigh small wound opened w/serosanginous drainage    Rt inferior posterolateral wound moist & granular tissue w/ nonviable tissue on inferior wall of dermis    (+) serosanguinous drainage & tender w/o odor  No erythema, increased warmth, induration, fluctuance    Procedure Note Rt thigh w/ nonviable tissue & Lt lateral thigh wounds w/ eschar on wound edge  Using aseptic technique abdominal wall wound was excisionally debrided using a scissor and forceps through nonviable dermis wound is down to subcutaneous & adipose tissue.  Pt tolerated procedure well.  Hemostasis was maintained throughout.      Lt lateral thigh wound  3cm x 1cm x 3.5cm w/ necrotic dermis  full thickness ulcers w/ moist & granular tissue   (+)serosanguinous  drainage  (+)tender w/o malodor  No erythema, increased warmth, induration, fluctuance    Lt lateral posterior thigh (inferior) superficial w/ 2 small partial thickness  fibrinous exudate  in 2cm x 1cm x 0.1cm  (+)serosanguinous drainage & tender  No erythema,  odor, increased warmth, induration, fluctuance    Lt  Upper medial inner thigh   3 wounds/ 2 partial thickness & 1 full thickness   moist &granular w/ fibrinous exudate  (+)serosanguinous drainage  (+)tender w/o odor  No erythema, increased warmth, induration, fluctuance      LABS:                        9.0    7.60  )-----------( 220      ( 03 Jul 2018 09:42 )             30.1     PT/INR - ( 02 Jul 2018 08:09 )   PT: 28.5 sec;   INR: 2.48 ratio    PTT - ( 03 Jul 2018 15:22 )  PTT:48.6 sec    RADIOLOGY & ADDITIONAL STUDIES:    < from: Xray Knee 1 or 2 Views, Right (07.03.18 @ 12:29) >  FINDINGS:    Patient status post arthrodesis. There is an intramedullary ruthann running   from femur to tibia with surrounding radiopaque cement. Hardware is   intact. No periprosthetic lucency or fracture. Noacute fracture. The   patella is less discretely identified than on prior examination. No   dislocation. There is calcification of arteries in the leg.  No soft   tissue swelling.

## 2018-07-03 NOTE — PROGRESS NOTE ADULT - ATTENDING COMMENTS
Dr. Murphy will cover starint 7/4/18. Please call service [phone] with questions. Dr. Murphy will cover staring 7/4/18. Please call service [phone] with questions.

## 2018-07-03 NOTE — PROGRESS NOTE ADULT - ASSESSMENT
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 completed 10 days meropenem.    # abd wound, multiple bl leg wounds  continue vac to abdomen, rt posterior thigh and rt Knee, vac changes M/W/F  appreciate wound care recs, cont NS irrigation, cavilon, medihoney  pain control  6/22 left thigh and left arm punch biopsies resulted and consistent with urticaria. appreciate allergy recs. pt currently asymptomatic so will hold off antihistamine  tissue culture grew coag neg staph which can be normal roopa / colonized  appreciate derm recs    # polymyalgia rheumatica  steroids on hold to allow for wound healing  appreciate rheum recs    #TKR wound-mrsa  chr minocycline  ID following    # Pain control  bowel regimen  constipation, increase miralax and add milk of mag and tap water enema if no BM    # chr Anemia  stable, monitor h/h closely    # S/P TAVR (transcatheter aortic valve replacement)  continue Coumadin, daily INR    # adjustment disorder with depressed mood  xanax prn    dispo:   discharge has been difficult as family has unrealistic expectation to stay in the hospital until wounds heal.  awaiting clearance for discharge from all subspecialists following  fu knee xray

## 2018-07-03 NOTE — PROGRESS NOTE ADULT - SUBJECTIVE AND OBJECTIVE BOX
Patient is a 77y old  Female who presents with a chief complaint of fever, lethargy (07 Jun 2018 22:19)      SUBJECTIVE / OVERNIGHT EVENTS:    Patient seen and examined.       Vital Signs Last 24 Hrs  T(C): 36.6 (03 Jul 2018 07:24), Max: 36.8 (02 Jul 2018 14:18)  T(F): 97.8 (03 Jul 2018 07:24), Max: 98.2 (02 Jul 2018 14:18)  HR: 98 (03 Jul 2018 07:24) (66 - 98)  BP: 131/81 (03 Jul 2018 07:24) (102/67 - 131/81)  BP(mean): --  RR: 18 (03 Jul 2018 07:24) (18 - 18)  SpO2: 98% (03 Jul 2018 07:24) (95% - 98%)  I&O's Summary    02 Jul 2018 07:01  -  03 Jul 2018 07:00  --------------------------------------------------------  IN: 240 mL / OUT: 0 mL / NET: 240 mL    03 Jul 2018 07:01  -  03 Jul 2018 10:38  --------------------------------------------------------  IN: 120 mL / OUT: 0 mL / NET: 120 mL        PE:  GENERAL: NAD, AAOx3  HEAD:  Atraumatic, Normocephalic  EYES: EOMI, PERRLA, conjunctiva and sclera clear  NECK: Supple, No JVD  CHEST/LUNG: CTABL, No wheeze  HEART: Regular rate and rhythm; + murmur  ABDOMEN: Soft, Nontender, Nondistended; Bowel sounds present  EXTREMITIES:  2+ Peripheral Pulses, No clubbing, cyanosis, or edema  SKIN: No rashes or lesions  NEURO: No focal deficits    LABS:                        8.8    7.70  )-----------( 204      ( 02 Jul 2018 09:29 )             29.6     07-03    140  |  101  |  29<H>  ----------------------------<  105<H>  4.3   |  26  |  0.73    Ca    9.4      03 Jul 2018 07:31      PT/INR - ( 02 Jul 2018 08:09 )   PT: 28.5 sec;   INR: 2.48 ratio         PTT - ( 02 Jul 2018 08:09 )  PTT:44.4 sec  CAPILLARY BLOOD GLUCOSE                RADIOLOGY & ADDITIONAL TESTS:    Imaging Personally Reviewed:  [x] YES  [ ] NO    Consultant(s) Notes Reviewed:  [x] YES  [ ] NO    MEDICATIONS  (STANDING):  ascorbic acid 500 milliGRAM(s) Oral daily  aspirin enteric coated 81 milliGRAM(s) Oral daily  betamethasone valerate 0.1% Ointment 1 Application(s) Topical two times a day  buDESOnide 160 MICROgram(s)/formoterol 4.5 MICROgram(s) Inhaler 2 Puff(s) Inhalation two times a day  calcium carbonate 1250 mG + Vitamin D (OsCal 500 + D) 1 Tablet(s) Oral three times a day  folic acid 1 milliGRAM(s) Oral daily  gabapentin 200 milliGRAM(s) Oral three times a day  loratadine 10 milliGRAM(s) Oral daily  melatonin 3 milliGRAM(s) Oral at bedtime  metoprolol tartrate 12.5 milliGRAM(s) Oral two times a day  minocycline 100 milliGRAM(s) Oral two times a day  multivitamin 1 Tablet(s) Oral daily  ondansetron Injectable 4 milliGRAM(s) IV Push once  pantoprazole    Tablet 40 milliGRAM(s) Oral before breakfast  polyethylene glycol 3350 17 Gram(s) Oral two times a day  simvastatin 20 milliGRAM(s) Oral at bedtime  tiotropium 18 MICROgram(s) Capsule 1 Capsule(s) Inhalation daily    MEDICATIONS  (PRN):  acetaminophen   Tablet. 650 milliGRAM(s) Oral every 6 hours PRN Mild Pain (1 - 3)  bisacodyl Suppository 10 milliGRAM(s) Rectal daily PRN Constipation  bismuth subsalicylate Liquid 30 milliLiter(s) Oral every 6 hours PRN Cramping/abd pain  HYDROmorphone   Tablet 2 milliGRAM(s) Oral every 3 hours PRN Moderate Pain (4 - 6)  HYDROmorphone  Injectable 0.5 milliGRAM(s) IV Push every 4 hours PRN Severe Pain (7 - 10)  magnesium hydroxide Suspension 30 milliLiter(s) Oral daily PRN Constipation  senna 2 Tablet(s) Oral at bedtime PRN Constipation  simethicone 80 milliGRAM(s) Chew two times a day PRN Gas      Care Discussed with Consultants/Other Providers [x] YES  [ ] NO    HEALTH ISSUES - PROBLEM Dx:  Urticaria: Urticaria  Joint infection: Joint infection  Other problems related to medical facilities and other health care: Other problems related to medical facilities and other health care  S/P TAVR (transcatheter aortic valve replacement): S/P TAVR (transcatheter aortic valve replacement)  Acute cystitis without hematuria: Acute cystitis without hematuria  Wound infection: Wound infection  Acute appendicitis, unspecified acute appendicitis type: Acute appendicitis, unspecified acute appendicitis type  Fever, unspecified fever cause: Fever, unspecified fever cause

## 2018-07-03 NOTE — PROGRESS NOTE ADULT - ASSESSMENT
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    Multiple wounds Abdomen Bilateral thighs -no gross signs of infection    VAC to RLQ abdominal wound and Rt  lateral thigh wounds & Rt knee  Multiple smaller wounds- Medihoney or Aquacel dressings- dressing order placed    Rt knee w/ skin dehiscence - per plastics and ortho  f/u Derm  &  Rheum consults- dx needed wounds not from scratching.  Wounds of knee & BLE/ abdomin too deep and involved w/ extensive necrosis.  Pt too tender when dressings are changed.  she couldn't scratch herself enough to create these wounds.  Pt also has HHA at bedside who denied self inflicted wounds.  no dx yet - unexplained wounds-  Abx per Medicine/ ID  Gen SUrg/ Plastics/ Ortho consults appreciated  con't offloading  con't Nuturition  con't Pericare  Con't per Medicine  F/u as outpatient in [facility] [phone]  d/w Medicine & Plastics team & d/w attending  Janki Cramer PA-C [id number]  I spent  35 minutes w/ this pt of which more than 50% of the time was spent counseling & coordinating care of this pt.

## 2018-07-04 LAB
ANION GAP SERPL CALC-SCNC: 16 MMOL/L — SIGNIFICANT CHANGE UP (ref 5–17)
APTT BLD: 54.9 SEC — HIGH (ref 27.5–37.4)
BUN SERPL-MCNC: 24 MG/DL — HIGH (ref 7–23)
CALCIUM SERPL-MCNC: 9.8 MG/DL — SIGNIFICANT CHANGE UP (ref 8.4–10.5)
CHLORIDE SERPL-SCNC: 99 MMOL/L — SIGNIFICANT CHANGE UP (ref 96–108)
CO2 SERPL-SCNC: 24 MMOL/L — SIGNIFICANT CHANGE UP (ref 22–31)
CREAT SERPL-MCNC: 0.76 MG/DL — SIGNIFICANT CHANGE UP (ref 0.5–1.3)
GLUCOSE SERPL-MCNC: 121 MG/DL — HIGH (ref 70–99)
HCT VFR BLD CALC: 30.2 % — LOW (ref 34.5–45)
HGB BLD-MCNC: 9.1 G/DL — LOW (ref 11.5–15.5)
INR BLD: 3.37 RATIO — HIGH (ref 0.88–1.16)
MCHC RBC-ENTMCNC: 26.5 PG — LOW (ref 27–34)
MCHC RBC-ENTMCNC: 30.1 GM/DL — LOW (ref 32–36)
MCV RBC AUTO: 87.8 FL — SIGNIFICANT CHANGE UP (ref 80–100)
PLATELET # BLD AUTO: 230 K/UL — SIGNIFICANT CHANGE UP (ref 150–400)
POTASSIUM SERPL-MCNC: 4.3 MMOL/L — SIGNIFICANT CHANGE UP (ref 3.5–5.3)
POTASSIUM SERPL-SCNC: 4.3 MMOL/L — SIGNIFICANT CHANGE UP (ref 3.5–5.3)
PROTHROM AB SERPL-ACNC: 39 SEC — HIGH (ref 10–13.1)
RBC # BLD: 3.44 M/UL — LOW (ref 3.8–5.2)
RBC # FLD: 17.9 % — HIGH (ref 10.3–14.5)
SODIUM SERPL-SCNC: 139 MMOL/L — SIGNIFICANT CHANGE UP (ref 135–145)
WBC # BLD: 8.01 K/UL — SIGNIFICANT CHANGE UP (ref 3.8–10.5)
WBC # FLD AUTO: 8.01 K/UL — SIGNIFICANT CHANGE UP (ref 3.8–10.5)

## 2018-07-04 PROCEDURE — 99232 SBSQ HOSP IP/OBS MODERATE 35: CPT

## 2018-07-04 RX ORDER — WARFARIN SODIUM 2.5 MG/1
1 TABLET ORAL ONCE
Qty: 0 | Refills: 0 | Status: COMPLETED | OUTPATIENT
Start: 2018-07-04 | End: 2018-07-04

## 2018-07-04 RX ADMIN — PANTOPRAZOLE SODIUM 40 MILLIGRAM(S): 20 TABLET, DELAYED RELEASE ORAL at 06:37

## 2018-07-04 RX ADMIN — GABAPENTIN 200 MILLIGRAM(S): 400 CAPSULE ORAL at 06:37

## 2018-07-04 RX ADMIN — HYDROMORPHONE HYDROCHLORIDE 0.5 MILLIGRAM(S): 2 INJECTION INTRAMUSCULAR; INTRAVENOUS; SUBCUTANEOUS at 07:03

## 2018-07-04 RX ADMIN — Medication 1 TABLET(S): at 06:37

## 2018-07-04 RX ADMIN — GABAPENTIN 200 MILLIGRAM(S): 400 CAPSULE ORAL at 21:21

## 2018-07-04 RX ADMIN — Medication 1 TABLET(S): at 21:21

## 2018-07-04 RX ADMIN — MINOCYCLINE HYDROCHLORIDE 100 MILLIGRAM(S): 45 TABLET, EXTENDED RELEASE ORAL at 18:12

## 2018-07-04 RX ADMIN — TIOTROPIUM BROMIDE 1 CAPSULE(S): 18 CAPSULE ORAL; RESPIRATORY (INHALATION) at 12:47

## 2018-07-04 RX ADMIN — MINOCYCLINE HYDROCHLORIDE 100 MILLIGRAM(S): 45 TABLET, EXTENDED RELEASE ORAL at 06:36

## 2018-07-04 RX ADMIN — Medication 3 MILLIGRAM(S): at 22:54

## 2018-07-04 RX ADMIN — LORATADINE 10 MILLIGRAM(S): 10 TABLET ORAL at 12:47

## 2018-07-04 RX ADMIN — HYDROMORPHONE HYDROCHLORIDE 0.5 MILLIGRAM(S): 2 INJECTION INTRAMUSCULAR; INTRAVENOUS; SUBCUTANEOUS at 07:18

## 2018-07-04 RX ADMIN — Medication 1 TABLET(S): at 12:48

## 2018-07-04 RX ADMIN — GABAPENTIN 200 MILLIGRAM(S): 400 CAPSULE ORAL at 12:49

## 2018-07-04 RX ADMIN — BUDESONIDE AND FORMOTEROL FUMARATE DIHYDRATE 2 PUFF(S): 160; 4.5 AEROSOL RESPIRATORY (INHALATION) at 18:12

## 2018-07-04 RX ADMIN — Medication 1 APPLICATION(S): at 18:12

## 2018-07-04 RX ADMIN — SIMVASTATIN 20 MILLIGRAM(S): 20 TABLET, FILM COATED ORAL at 21:21

## 2018-07-04 RX ADMIN — HYDROMORPHONE HYDROCHLORIDE 0.5 MILLIGRAM(S): 2 INJECTION INTRAMUSCULAR; INTRAVENOUS; SUBCUTANEOUS at 21:11

## 2018-07-04 RX ADMIN — HYDROMORPHONE HYDROCHLORIDE 0.5 MILLIGRAM(S): 2 INJECTION INTRAMUSCULAR; INTRAVENOUS; SUBCUTANEOUS at 14:07

## 2018-07-04 RX ADMIN — Medication 30 MILLILITER(S): at 19:53

## 2018-07-04 RX ADMIN — Medication 500 MILLIGRAM(S): at 12:50

## 2018-07-04 RX ADMIN — Medication 1 APPLICATION(S): at 06:43

## 2018-07-04 RX ADMIN — ONDANSETRON 4 MILLIGRAM(S): 8 TABLET, FILM COATED ORAL at 21:15

## 2018-07-04 RX ADMIN — Medication 1 MILLIGRAM(S): at 12:50

## 2018-07-04 RX ADMIN — Medication 1 TABLET(S): at 12:47

## 2018-07-04 RX ADMIN — WARFARIN SODIUM 1 MILLIGRAM(S): 2.5 TABLET ORAL at 21:21

## 2018-07-04 RX ADMIN — BUDESONIDE AND FORMOTEROL FUMARATE DIHYDRATE 2 PUFF(S): 160; 4.5 AEROSOL RESPIRATORY (INHALATION) at 06:43

## 2018-07-04 RX ADMIN — Medication 81 MILLIGRAM(S): at 12:47

## 2018-07-04 RX ADMIN — HYDROMORPHONE HYDROCHLORIDE 0.5 MILLIGRAM(S): 2 INJECTION INTRAMUSCULAR; INTRAVENOUS; SUBCUTANEOUS at 21:26

## 2018-07-04 RX ADMIN — Medication 12.5 MILLIGRAM(S): at 06:36

## 2018-07-04 NOTE — PROGRESS NOTE ADULT - SUBJECTIVE AND OBJECTIVE BOX
Patient is a 77y old  Female who presents with a chief complaint of fever, lethargy (07 Jun 2018 22:19)      INTERVAL HPI/OVERNIGHT EVENTS: noted, feels well      Vital Signs Last 24 Hrs  T(C): 36.9 (04 Jul 2018 04:58), Max: 37.4 (03 Jul 2018 15:26)  T(F): 98.5 (04 Jul 2018 04:58), Max: 99.4 (03 Jul 2018 15:26)  HR: 78 (04 Jul 2018 04:58) (77 - 86)  BP: 137/84 (04 Jul 2018 04:58) (113/74 - 137/84)  BP(mean): --  RR: 18 (04 Jul 2018 04:58) (18 - 18)  SpO2: 92% (04 Jul 2018 04:58) (92% - 95%)    acetaminophen   Tablet. 650 milliGRAM(s) Oral every 6 hours PRN  ascorbic acid 500 milliGRAM(s) Oral daily  aspirin enteric coated 81 milliGRAM(s) Oral daily  betamethasone valerate 0.1% Ointment 1 Application(s) Topical two times a day  bisacodyl Suppository 10 milliGRAM(s) Rectal daily PRN  bismuth subsalicylate Liquid 30 milliLiter(s) Oral every 6 hours PRN  buDESOnide 160 MICROgram(s)/formoterol 4.5 MICROgram(s) Inhaler 2 Puff(s) Inhalation two times a day  calcium carbonate 1250 mG + Vitamin D (OsCal 500 + D) 1 Tablet(s) Oral three times a day  folic acid 1 milliGRAM(s) Oral daily  gabapentin 200 milliGRAM(s) Oral three times a day  HYDROmorphone   Tablet 2 milliGRAM(s) Oral every 3 hours PRN  HYDROmorphone  Injectable 0.5 milliGRAM(s) IV Push every 4 hours PRN  loratadine 10 milliGRAM(s) Oral daily  magnesium hydroxide Suspension 30 milliLiter(s) Oral daily PRN  melatonin 3 milliGRAM(s) Oral at bedtime  metoprolol tartrate 12.5 milliGRAM(s) Oral two times a day  minocycline 100 milliGRAM(s) Oral two times a day  multivitamin 1 Tablet(s) Oral daily  ondansetron Injectable 4 milliGRAM(s) IV Push once  pantoprazole    Tablet 40 milliGRAM(s) Oral before breakfast  polyethylene glycol 3350 17 Gram(s) Oral two times a day  senna 2 Tablet(s) Oral at bedtime PRN  simethicone 80 milliGRAM(s) Chew two times a day PRN  simvastatin 20 milliGRAM(s) Oral at bedtime  tiotropium 18 MICROgram(s) Capsule 1 Capsule(s) Inhalation daily      PHYSICAL EXAM:  GENERAL: NAD,   EYES: conjunctiva and sclera clear  ENMT: Moist mucous membranes  NECK: Supple, No JVD, Normal thyroid  NERVOUS SYSTEM:  Alert & Oriented X3,   CHEST/LUNG: Clear to auscultation bilaterally; No rales, rhonchi, wheezing, or rubs  HEART: Regular rate and rhythm; No murmurs, rubs, or gallops  ABDOMEN: 2+ Peripheral Pulses, right thigh wound c wound vac, right knee debrided, left thigh small wound packed  EXTREMITIES:  2+ Peripheral Pulses, No clubbing, cyanosis, or edema  LYMPH: No lymphadenopathy noted  SKIN: No rashes or lesions    Consultant(s) Notes Reviewed:  [x ] YES  [ ] NO  Care Discussed with Consultants/Other Providers [ x] YES  [ ] NO    LABS:                        9.0    7.60  )-----------( 220      ( 03 Jul 2018 09:42 )             30.1     07-04    139  |  99  |  24<H>  ----------------------------<  121<H>  4.3   |  24  |  0.76    Ca    9.8      04 Jul 2018 07:23      PT/INR - ( 03 Jul 2018 15:22 )   PT: 37.9 sec;   INR: 3.42 ratio         PTT - ( 03 Jul 2018 15:22 )  PTT:48.6 sec    CAPILLARY BLOOD GLUCOSE                RADIOLOGY & ADDITIONAL TESTS:    Imaging Personally Reviewed:  [ ] YES  [ ] NO

## 2018-07-04 NOTE — PROGRESS NOTE ADULT - SUBJECTIVE AND OBJECTIVE BOX
S: patient seen and examined on AM rounds. AVSS    Vital Signs Last 24 Hrs  T(C): 36.9 (04 Jul 2018 04:58), Max: 37.4 (03 Jul 2018 15:26)  T(F): 98.5 (04 Jul 2018 04:58), Max: 99.4 (03 Jul 2018 15:26)  HR: 78 (04 Jul 2018 04:58) (77 - 86)  BP: 137/84 (04 Jul 2018 04:58) (113/74 - 137/84)  BP(mean): --  RR: 18 (04 Jul 2018 04:58) (18 - 18)  SpO2: 92% (04 Jul 2018 04:58) (92% - 95%)        ** PHYSICAL EXAM **    -- CONSTITUTIONAL: Alert, NAD  -- ABD: central abd VAC intact set to suction  -- Ext: R thigh VAC intact holding suction, Right knee VAC holding suction

## 2018-07-04 NOTE — PROGRESS NOTE ADULT - ASSESSMENT
A/P: s/p Right total knee insertion of spacer, irrigation and debridement, complex wound closure 3/23; readmitted 6/8 for appendicitis  - cont vac change to R knee, thigh, and abdomen M/W/F  - remainder of wounds per wound care/derm  - ok for discharge from [facility] perspective    Plastic Surgery (pg TOYA: [id number], NS: [phone])

## 2018-07-04 NOTE — PROGRESS NOTE ADULT - ASSESSMENT
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 completed 10 days meropenem.    # abd wound, multiple bl leg wounds  continue vac to abdomen, rt posterior thigh and rt Knee, vac changes M/W/F  appreciate wound care recs, cont NS irrigation, cavilon, medihoney  pain control  6/22 left thigh and left arm punch biopsies resulted and consistent with urticaria. appreciate allergy recs. pt currently asymptomatic so will hold off antihistamine  tissue culture grew coag neg staph which can be normal roopa / colonized  appreciate derm recs    # polymyalgia rheumatica  steroids on hold to allow for wound healing  appreciate rheum recs    #TKR wound-mrsa  chr minocycline  ID following    # Pain control  bowel regimen  constipation, increase miralax and add milk of mag and tap water enema if no BM    # chr Anemia  stable, monitor h/h closely    # S/P TAVR (transcatheter aortic valve replacement)  continue Coumadin, daily INR    # adjustment disorder with depressed mood  xanax prn    dispo:   discharge has been difficult as family has unrealistic expectation to stay in the hospital until wounds heal.  awaiting clearance for discharge from all subspecialists following  fu knee xray: reviewed-no acute changes  f/u ortho recs

## 2018-07-04 NOTE — PROGRESS NOTE ADULT - SUBJECTIVE AND OBJECTIVE BOX
pain controlled, no pain today  refused to get oob yesterday stating that she was constipated but this is now resolved    abdominal wound - vac  RLE  thigh - dressing per wound care teams, lateral wound vac  knee wound with vac   remainder of wounds dressed by wound care  5/5 ta/ehl/gcs  silt l4-s1  2+ dp pulse    ROS: depressed    XR R knee performed yesterday, no change in position of static spacer, no fracture

## 2018-07-04 NOTE — PROGRESS NOTE ADULT - ASSESSMENT
no plan for orthopedic surgical intervention per family and patient preference  vac and wound care per PRS and wound care teams  PO abx per ID team, minocycline  encourage patient to spend majority of bed oob in bedside chair as able, has not been oob recently, PLEASE HAVE PT COME BY TO MOBILIZE PATIENT, she must remain 50% wb on the RLE and NO KNEE MOTION allowed, knee immobilizer if oob  coumadin, inr goal 2-3, currently supratherapeutic  continue to optimize nutrition  continue to involve psych  derm, rhem w/u for leg lesions felt to be of unclear etiology  orthopedic issues are stable for discharge to rehab, please make sure patient and family is okay with discharge before initiating planning process

## 2018-07-05 LAB
ANION GAP SERPL CALC-SCNC: 14 MMOL/L — SIGNIFICANT CHANGE UP (ref 5–17)
BUN SERPL-MCNC: 24 MG/DL — HIGH (ref 7–23)
CALCIUM SERPL-MCNC: 9.3 MG/DL — SIGNIFICANT CHANGE UP (ref 8.4–10.5)
CHLORIDE SERPL-SCNC: 98 MMOL/L — SIGNIFICANT CHANGE UP (ref 96–108)
CO2 SERPL-SCNC: 27 MMOL/L — SIGNIFICANT CHANGE UP (ref 22–31)
CREAT SERPL-MCNC: 0.78 MG/DL — SIGNIFICANT CHANGE UP (ref 0.5–1.3)
GLUCOSE SERPL-MCNC: 103 MG/DL — HIGH (ref 70–99)
HCT VFR BLD CALC: 29 % — LOW (ref 34.5–45)
HGB BLD-MCNC: 8.9 G/DL — LOW (ref 11.5–15.5)
INR BLD: 3.45 RATIO — HIGH (ref 0.88–1.16)
MCHC RBC-ENTMCNC: 26.6 PG — LOW (ref 27–34)
MCHC RBC-ENTMCNC: 30.7 GM/DL — LOW (ref 32–36)
MCV RBC AUTO: 86.8 FL — SIGNIFICANT CHANGE UP (ref 80–100)
PLATELET # BLD AUTO: 217 K/UL — SIGNIFICANT CHANGE UP (ref 150–400)
POTASSIUM SERPL-MCNC: 4.4 MMOL/L — SIGNIFICANT CHANGE UP (ref 3.5–5.3)
POTASSIUM SERPL-SCNC: 4.4 MMOL/L — SIGNIFICANT CHANGE UP (ref 3.5–5.3)
PROTHROM AB SERPL-ACNC: 40 SEC — HIGH (ref 10–13.1)
RBC # BLD: 3.34 M/UL — LOW (ref 3.8–5.2)
RBC # FLD: 17.6 % — HIGH (ref 10.3–14.5)
SODIUM SERPL-SCNC: 139 MMOL/L — SIGNIFICANT CHANGE UP (ref 135–145)
WBC # BLD: 8.3 K/UL — SIGNIFICANT CHANGE UP (ref 3.8–10.5)
WBC # FLD AUTO: 8.3 K/UL — SIGNIFICANT CHANGE UP (ref 3.8–10.5)

## 2018-07-05 PROCEDURE — 99232 SBSQ HOSP IP/OBS MODERATE 35: CPT

## 2018-07-05 RX ORDER — HYDROMORPHONE HYDROCHLORIDE 2 MG/ML
0.5 INJECTION INTRAMUSCULAR; INTRAVENOUS; SUBCUTANEOUS ONCE
Qty: 0 | Refills: 0 | Status: DISCONTINUED | OUTPATIENT
Start: 2018-07-05 | End: 2018-07-05

## 2018-07-05 RX ADMIN — Medication 81 MILLIGRAM(S): at 11:40

## 2018-07-05 RX ADMIN — PANTOPRAZOLE SODIUM 40 MILLIGRAM(S): 20 TABLET, DELAYED RELEASE ORAL at 06:44

## 2018-07-05 RX ADMIN — TIOTROPIUM BROMIDE 1 CAPSULE(S): 18 CAPSULE ORAL; RESPIRATORY (INHALATION) at 11:40

## 2018-07-05 RX ADMIN — Medication 500 MILLIGRAM(S): at 11:40

## 2018-07-05 RX ADMIN — HYDROMORPHONE HYDROCHLORIDE 0.5 MILLIGRAM(S): 2 INJECTION INTRAMUSCULAR; INTRAVENOUS; SUBCUTANEOUS at 20:54

## 2018-07-05 RX ADMIN — HYDROMORPHONE HYDROCHLORIDE 0.5 MILLIGRAM(S): 2 INJECTION INTRAMUSCULAR; INTRAVENOUS; SUBCUTANEOUS at 15:02

## 2018-07-05 RX ADMIN — POLYETHYLENE GLYCOL 3350 17 GRAM(S): 17 POWDER, FOR SOLUTION ORAL at 18:05

## 2018-07-05 RX ADMIN — Medication 1 APPLICATION(S): at 06:44

## 2018-07-05 RX ADMIN — SIMVASTATIN 20 MILLIGRAM(S): 20 TABLET, FILM COATED ORAL at 21:00

## 2018-07-05 RX ADMIN — Medication 1 TABLET(S): at 11:40

## 2018-07-05 RX ADMIN — BUDESONIDE AND FORMOTEROL FUMARATE DIHYDRATE 2 PUFF(S): 160; 4.5 AEROSOL RESPIRATORY (INHALATION) at 18:05

## 2018-07-05 RX ADMIN — Medication 3 MILLIGRAM(S): at 21:00

## 2018-07-05 RX ADMIN — HYDROMORPHONE HYDROCHLORIDE 0.5 MILLIGRAM(S): 2 INJECTION INTRAMUSCULAR; INTRAVENOUS; SUBCUTANEOUS at 21:10

## 2018-07-05 RX ADMIN — Medication 1 TABLET(S): at 21:00

## 2018-07-05 RX ADMIN — HYDROMORPHONE HYDROCHLORIDE 0.5 MILLIGRAM(S): 2 INJECTION INTRAMUSCULAR; INTRAVENOUS; SUBCUTANEOUS at 10:04

## 2018-07-05 RX ADMIN — BUDESONIDE AND FORMOTEROL FUMARATE DIHYDRATE 2 PUFF(S): 160; 4.5 AEROSOL RESPIRATORY (INHALATION) at 06:44

## 2018-07-05 RX ADMIN — HYDROMORPHONE HYDROCHLORIDE 0.5 MILLIGRAM(S): 2 INJECTION INTRAMUSCULAR; INTRAVENOUS; SUBCUTANEOUS at 14:32

## 2018-07-05 RX ADMIN — Medication 12.5 MILLIGRAM(S): at 18:04

## 2018-07-05 RX ADMIN — Medication 12.5 MILLIGRAM(S): at 06:44

## 2018-07-05 RX ADMIN — HYDROMORPHONE HYDROCHLORIDE 0.5 MILLIGRAM(S): 2 INJECTION INTRAMUSCULAR; INTRAVENOUS; SUBCUTANEOUS at 06:35

## 2018-07-05 RX ADMIN — Medication 1 MILLIGRAM(S): at 11:40

## 2018-07-05 RX ADMIN — Medication 1 TABLET(S): at 06:44

## 2018-07-05 RX ADMIN — MINOCYCLINE HYDROCHLORIDE 100 MILLIGRAM(S): 45 TABLET, EXTENDED RELEASE ORAL at 18:04

## 2018-07-05 RX ADMIN — Medication 1 APPLICATION(S): at 18:04

## 2018-07-05 RX ADMIN — GABAPENTIN 200 MILLIGRAM(S): 400 CAPSULE ORAL at 21:00

## 2018-07-05 RX ADMIN — GABAPENTIN 200 MILLIGRAM(S): 400 CAPSULE ORAL at 11:40

## 2018-07-05 RX ADMIN — GABAPENTIN 200 MILLIGRAM(S): 400 CAPSULE ORAL at 06:43

## 2018-07-05 RX ADMIN — LORATADINE 10 MILLIGRAM(S): 10 TABLET ORAL at 11:40

## 2018-07-05 RX ADMIN — HYDROMORPHONE HYDROCHLORIDE 0.5 MILLIGRAM(S): 2 INJECTION INTRAMUSCULAR; INTRAVENOUS; SUBCUTANEOUS at 11:35

## 2018-07-05 RX ADMIN — MINOCYCLINE HYDROCHLORIDE 100 MILLIGRAM(S): 45 TABLET, EXTENDED RELEASE ORAL at 06:43

## 2018-07-05 NOTE — PROGRESS NOTE ADULT - ASSESSMENT
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 completed 10 days meropenem.    # abd wound, multiple bl leg wounds  continue vac to abdomen, rt posterior thigh and rt Knee, vac changes M/W/F  appreciate wound care recs, cont NS irrigation, cavilon, medihoney  pain control  6/22 left thigh and left arm punch biopsies resulted and consistent with urticaria. appreciate allergy recs. pt currently asymptomatic so will hold off antihistamine  tissue culture grew coag neg staph which can be normal roopa / colonized  appreciate derm recs    # polymyalgia rheumatica  steroids on hold to allow for wound healing  appreciate rheum recs    #TKR wound-mrsa  chr minocycline  ID following    # Pain control  bowel regimen  constipation, increase miralax and add milk of mag and tap water enema if no BM    # chr Anemia  stable, monitor h/h closely    # S/P TAVR (transcatheter aortic valve replacement)  continue Coumadin, daily INR    # adjustment disorder with depressed mood  xanax prn    dispo:   discharge has been difficult as family has unrealistic expectation to stay in the hospital until wounds heal.    fu knee xray: reviewed-no acute changes  ortho recs appreciated  pt cleared for discharge by all subspecialities-ortho, plastics, derm, rheum, allergy, ID, surgery and wound care

## 2018-07-05 NOTE — CHART NOTE - NSCHARTNOTEFT_GEN_A_CORE
Pt admitted c acute appendicitis (completed antibiotics), abdominal wound and multiple luann. leg wounds, noted wound VAC continues to be in place, noted Dermatology following. Interim events noted.     Source: Patient [x ]    Family [ ]     other [ x]    Diet : Regular diet with nepro 2 x daily, james 2 x daily       Patient reports nausea, no episodes of vomiting, last BM yesterday     PO intake:  < 50% [ x] 50-75% [ ]   % [ ]  other : Pt reports ongoing decreased appetite, pt will take ~25% of meals supplemented by cheese and crackers from home and hospital, 1-2 nepro per day (more often 2), as well as james 1 x daily. Obtained further food preferences at time of visit.         Current Weight: 220.6 lbs (6/7), 215.3 lbs (6/27), 214.2 lbs (7/4), wt decreased from admission, relatively stable for the past week.   % Weight Change    Pertinent Medications: MEDICATIONS  (STANDING):  ascorbic acid 500 milliGRAM(s) Oral daily  aspirin enteric coated 81 milliGRAM(s) Oral daily  betamethasone valerate 0.1% Ointment 1 Application(s) Topical two times a day  buDESOnide 160 MICROgram(s)/formoterol 4.5 MICROgram(s) Inhaler 2 Puff(s) Inhalation two times a day  calcium carbonate 1250 mG + Vitamin D (OsCal 500 + D) 1 Tablet(s) Oral three times a day  folic acid 1 milliGRAM(s) Oral daily  gabapentin 200 milliGRAM(s) Oral three times a day  loratadine 10 milliGRAM(s) Oral daily  melatonin 3 milliGRAM(s) Oral at bedtime  metoprolol tartrate 12.5 milliGRAM(s) Oral two times a day  minocycline 100 milliGRAM(s) Oral two times a day  multivitamin 1 Tablet(s) Oral daily  pantoprazole    Tablet 40 milliGRAM(s) Oral before breakfast  polyethylene glycol 3350 17 Gram(s) Oral two times a day  simvastatin 20 milliGRAM(s) Oral at bedtime  tiotropium 18 MICROgram(s) Capsule 1 Capsule(s) Inhalation daily    MEDICATIONS  (PRN):  acetaminophen   Tablet. 650 milliGRAM(s) Oral every 6 hours PRN Mild Pain (1 - 3)  bisacodyl Suppository 10 milliGRAM(s) Rectal daily PRN Constipation  bismuth subsalicylate Liquid 30 milliLiter(s) Oral every 6 hours PRN Cramping/abd pain  HYDROmorphone   Tablet 2 milliGRAM(s) Oral every 3 hours PRN Moderate Pain (4 - 6)  HYDROmorphone  Injectable 0.5 milliGRAM(s) IV Push every 4 hours PRN Severe Pain (7 - 10)  magnesium hydroxide Suspension 30 milliLiter(s) Oral daily PRN Constipation  senna 2 Tablet(s) Oral at bedtime PRN Constipation  simethicone 80 milliGRAM(s) Chew two times a day PRN Gas    Pertinent Labs:  07-05 Na139 mmol/L Glu 103 mg/dL<H> K+ 4.4 mmol/L Cr  0.78 mg/dL BUN 24 mg/dL<H>      Skin: 1+ luann ankle/foot edema, 2+ luann leg edema, skin noted with several wounds.     Estimated Needs:   [ x] no change since previous assessment  [ ] recalculated:       Previous Nutrition Diagnosis:     [ x] Malnutrition (moderate), ongoing-addressed with supplements and food preferences  [x] Obesity class III, ongoing-weight management education not appropriate at this time           New Nutrition Diagnosis: [ x] not applicable         Interventions:     Recommend    1. Continue diet as ordered  2. Continue to encourage po intake and obtain/honor food preferences as able, will continue to provide cheeses which patient enjoys, RD reviewed importance of adequate po intake with emphasis on protein foods first.          Monitoring and Evaluation:     [ x] PO intake [x ] Tolerance to diet prescription [x ] weights [x ] follow up per protocol    [ ] other:

## 2018-07-05 NOTE — PROGRESS NOTE ADULT - SUBJECTIVE AND OBJECTIVE BOX
pain controlled  still not oob    abdominal wound - vac  RLE  thigh - dressing per wound care teams, lateral wound vac  knee wound with vac   remainder of wounds dressed by wound care  5/5 ta/ehl/gcs  silt l4-s1  2+ dp pulse    ROS: depressed

## 2018-07-05 NOTE — PROGRESS NOTE ADULT - SUBJECTIVE AND OBJECTIVE BOX
Patient is a 77y old  Female who presents with a chief complaint of fever, lethargy (07 Jun 2018 22:19)      INTERVAL HPI/OVERNIGHT EVENTS: noted, feels well      Vital Signs Last 24 Hrs  T(C): 36.6 (05 Jul 2018 09:09), Max: 36.8 (04 Jul 2018 18:06)  T(F): 97.9 (05 Jul 2018 09:09), Max: 98.2 (04 Jul 2018 18:06)  HR: 70 (05 Jul 2018 09:09) (70 - 114)  BP: 105/74 (05 Jul 2018 09:09) (99/63 - 128/78)  BP(mean): --  RR: 19 (05 Jul 2018 09:09) (18 - 20)  SpO2: 94% (05 Jul 2018 09:09) (93% - 96%)    acetaminophen   Tablet. 650 milliGRAM(s) Oral every 6 hours PRN  ascorbic acid 500 milliGRAM(s) Oral daily  aspirin enteric coated 81 milliGRAM(s) Oral daily  betamethasone valerate 0.1% Ointment 1 Application(s) Topical two times a day  bisacodyl Suppository 10 milliGRAM(s) Rectal daily PRN  bismuth subsalicylate Liquid 30 milliLiter(s) Oral every 6 hours PRN  buDESOnide 160 MICROgram(s)/formoterol 4.5 MICROgram(s) Inhaler 2 Puff(s) Inhalation two times a day  calcium carbonate 1250 mG + Vitamin D (OsCal 500 + D) 1 Tablet(s) Oral three times a day  folic acid 1 milliGRAM(s) Oral daily  gabapentin 200 milliGRAM(s) Oral three times a day  HYDROmorphone   Tablet 2 milliGRAM(s) Oral every 3 hours PRN  HYDROmorphone  Injectable 0.5 milliGRAM(s) IV Push every 4 hours PRN  loratadine 10 milliGRAM(s) Oral daily  magnesium hydroxide Suspension 30 milliLiter(s) Oral daily PRN  melatonin 3 milliGRAM(s) Oral at bedtime  metoprolol tartrate 12.5 milliGRAM(s) Oral two times a day  minocycline 100 milliGRAM(s) Oral two times a day  multivitamin 1 Tablet(s) Oral daily  pantoprazole    Tablet 40 milliGRAM(s) Oral before breakfast  polyethylene glycol 3350 17 Gram(s) Oral two times a day  senna 2 Tablet(s) Oral at bedtime PRN  simethicone 80 milliGRAM(s) Chew two times a day PRN  simvastatin 20 milliGRAM(s) Oral at bedtime  tiotropium 18 MICROgram(s) Capsule 1 Capsule(s) Inhalation daily      PHYSICAL EXAM:  GENERAL: NAD,   EYES: conjunctiva and sclera clear  ENMT: Moist mucous membranes  NECK: Supple, No JVD, Normal thyroid  NERVOUS SYSTEM:  Alert & Oriented X3,   CHEST/LUNG: Clear to auscultation bilaterally; No rales, rhonchi, wheezing, or rubs  HEART: Regular rate and rhythm; No murmurs, rubs, or gallops  ABDOMEN: Soft, Nontender, Nondistended; Bowel sounds present  right medial thigh wound dressing, wound vac to right knee , left thigh small wound packed  EXTREMITIES:  2+ Peripheral Pulses, No clubbing, cyanosis, or edema  LYMPH: No lymphadenopathy noted  SKIN: No rashes or lesions    Consultant(s) Notes Reviewed:  [x ] YES  [ ] NO  Care Discussed with Consultants/Other Providers [ x] YES  [ ] NO    LABS:                        8.9    8.30  )-----------( 217      ( 05 Jul 2018 09:31 )             29.0     07-05    139  |  98  |  24<H>  ----------------------------<  103<H>  4.4   |  27  |  0.78    Ca    9.3      05 Jul 2018 07:24      PT/INR - ( 05 Jul 2018 09:31 )   PT: 40.0 sec;   INR: 3.45 ratio         PTT - ( 04 Jul 2018 10:22 )  PTT:54.9 sec    CAPILLARY BLOOD GLUCOSE                RADIOLOGY & ADDITIONAL TESTS:    Imaging Personally Reviewed:  [x ] YES  [ ] NO

## 2018-07-06 PROCEDURE — 99232 SBSQ HOSP IP/OBS MODERATE 35: CPT

## 2018-07-06 RX ORDER — SODIUM HYPOCHLORITE 0.125 %
1 SOLUTION, NON-ORAL MISCELLANEOUS
Qty: 0 | Refills: 0 | Status: DISCONTINUED | OUTPATIENT
Start: 2018-07-06 | End: 2018-08-16

## 2018-07-06 RX ADMIN — Medication 500 MILLIGRAM(S): at 12:41

## 2018-07-06 RX ADMIN — HYDROMORPHONE HYDROCHLORIDE 0.5 MILLIGRAM(S): 2 INJECTION INTRAMUSCULAR; INTRAVENOUS; SUBCUTANEOUS at 21:02

## 2018-07-06 RX ADMIN — Medication 1 APPLICATION(S): at 17:50

## 2018-07-06 RX ADMIN — Medication 1 TABLET(S): at 15:21

## 2018-07-06 RX ADMIN — GABAPENTIN 200 MILLIGRAM(S): 400 CAPSULE ORAL at 21:08

## 2018-07-06 RX ADMIN — Medication 3 MILLIGRAM(S): at 21:10

## 2018-07-06 RX ADMIN — MINOCYCLINE HYDROCHLORIDE 100 MILLIGRAM(S): 45 TABLET, EXTENDED RELEASE ORAL at 05:43

## 2018-07-06 RX ADMIN — TIOTROPIUM BROMIDE 1 CAPSULE(S): 18 CAPSULE ORAL; RESPIRATORY (INHALATION) at 12:41

## 2018-07-06 RX ADMIN — HYDROMORPHONE HYDROCHLORIDE 2 MILLIGRAM(S): 2 INJECTION INTRAMUSCULAR; INTRAVENOUS; SUBCUTANEOUS at 08:07

## 2018-07-06 RX ADMIN — HYDROMORPHONE HYDROCHLORIDE 0.5 MILLIGRAM(S): 2 INJECTION INTRAMUSCULAR; INTRAVENOUS; SUBCUTANEOUS at 06:55

## 2018-07-06 RX ADMIN — PANTOPRAZOLE SODIUM 40 MILLIGRAM(S): 20 TABLET, DELAYED RELEASE ORAL at 06:05

## 2018-07-06 RX ADMIN — Medication 1 TABLET(S): at 21:07

## 2018-07-06 RX ADMIN — HYDROMORPHONE HYDROCHLORIDE 2 MILLIGRAM(S): 2 INJECTION INTRAMUSCULAR; INTRAVENOUS; SUBCUTANEOUS at 09:07

## 2018-07-06 RX ADMIN — Medication 1 MILLIGRAM(S): at 12:41

## 2018-07-06 RX ADMIN — Medication 1 TABLET(S): at 05:44

## 2018-07-06 RX ADMIN — GABAPENTIN 200 MILLIGRAM(S): 400 CAPSULE ORAL at 15:21

## 2018-07-06 RX ADMIN — Medication 12.5 MILLIGRAM(S): at 05:43

## 2018-07-06 RX ADMIN — POLYETHYLENE GLYCOL 3350 17 GRAM(S): 17 POWDER, FOR SOLUTION ORAL at 05:44

## 2018-07-06 RX ADMIN — HYDROMORPHONE HYDROCHLORIDE 0.5 MILLIGRAM(S): 2 INJECTION INTRAMUSCULAR; INTRAVENOUS; SUBCUTANEOUS at 15:52

## 2018-07-06 RX ADMIN — GABAPENTIN 200 MILLIGRAM(S): 400 CAPSULE ORAL at 05:43

## 2018-07-06 RX ADMIN — HYDROMORPHONE HYDROCHLORIDE 0.5 MILLIGRAM(S): 2 INJECTION INTRAMUSCULAR; INTRAVENOUS; SUBCUTANEOUS at 15:37

## 2018-07-06 RX ADMIN — BUDESONIDE AND FORMOTEROL FUMARATE DIHYDRATE 2 PUFF(S): 160; 4.5 AEROSOL RESPIRATORY (INHALATION) at 05:44

## 2018-07-06 RX ADMIN — HYDROMORPHONE HYDROCHLORIDE 0.5 MILLIGRAM(S): 2 INJECTION INTRAMUSCULAR; INTRAVENOUS; SUBCUTANEOUS at 21:30

## 2018-07-06 RX ADMIN — Medication 1 APPLICATION(S): at 05:44

## 2018-07-06 RX ADMIN — HYDROMORPHONE HYDROCHLORIDE 0.5 MILLIGRAM(S): 2 INJECTION INTRAMUSCULAR; INTRAVENOUS; SUBCUTANEOUS at 09:59

## 2018-07-06 RX ADMIN — LORATADINE 10 MILLIGRAM(S): 10 TABLET ORAL at 12:42

## 2018-07-06 RX ADMIN — BUDESONIDE AND FORMOTEROL FUMARATE DIHYDRATE 2 PUFF(S): 160; 4.5 AEROSOL RESPIRATORY (INHALATION) at 17:51

## 2018-07-06 RX ADMIN — HYDROMORPHONE HYDROCHLORIDE 0.5 MILLIGRAM(S): 2 INJECTION INTRAMUSCULAR; INTRAVENOUS; SUBCUTANEOUS at 10:01

## 2018-07-06 RX ADMIN — HYDROMORPHONE HYDROCHLORIDE 2 MILLIGRAM(S): 2 INJECTION INTRAMUSCULAR; INTRAVENOUS; SUBCUTANEOUS at 12:36

## 2018-07-06 RX ADMIN — Medication 1 APPLICATION(S): at 20:18

## 2018-07-06 RX ADMIN — SIMVASTATIN 20 MILLIGRAM(S): 20 TABLET, FILM COATED ORAL at 21:10

## 2018-07-06 RX ADMIN — Medication 12.5 MILLIGRAM(S): at 17:51

## 2018-07-06 RX ADMIN — HYDROMORPHONE HYDROCHLORIDE 0.5 MILLIGRAM(S): 2 INJECTION INTRAMUSCULAR; INTRAVENOUS; SUBCUTANEOUS at 06:33

## 2018-07-06 RX ADMIN — HYDROMORPHONE HYDROCHLORIDE 2 MILLIGRAM(S): 2 INJECTION INTRAMUSCULAR; INTRAVENOUS; SUBCUTANEOUS at 13:36

## 2018-07-06 RX ADMIN — Medication 81 MILLIGRAM(S): at 12:42

## 2018-07-06 RX ADMIN — MINOCYCLINE HYDROCHLORIDE 100 MILLIGRAM(S): 45 TABLET, EXTENDED RELEASE ORAL at 17:50

## 2018-07-06 NOTE — PROGRESS NOTE ADULT - SUBJECTIVE AND OBJECTIVE BOX
pain controlled  still not oob  family and friends visited today    abdominal wound - vac  RLE  thigh - dressing per wound care teams, lateral wound vac dc to dressing now  knee wound with vac   remainder of wounds dressed by wound care  5/5 ta/ehl/gcs  silt l4-s1  2+ dp pulse    ROS: depressed

## 2018-07-06 NOTE — PROGRESS NOTE ADULT - ASSESSMENT
A/P: s/p Right total knee insertion of spacer, irrigation and debridement, complex wound closure 3/23; readmitted 6/8 for appendicitis    - D/w primary team regarding pain consult  - cont vac/dressing changes to R knee, thigh, and abdomen M/W/F  - remainder of wounds per wound care/derm  - ok for discharge from PRS perspective    Plastic Surgery (pg [facility]: [id number], NS: [phone])

## 2018-07-06 NOTE — PROGRESS NOTE ADULT - ASSESSMENT
no plan for orthopedic surgical intervention per family and patient preference  vac and wound care per PRS and wound care teams  PO abx per ID team, minocycline  encourage patient to spend majority of bed oob in bedside chair as able, has not been oob recently, PLEASE HAVE PT COME BY TO MOBILIZE PATIENT, she must remain 50% wb on the RLE and NO KNEE MOTION allowed, knee immobilizer if oob  coumadin, inr goal 2-3  continue to optimize nutrition  continue to involve psych  orthopedic issues are stable for discharge to rehab, please make sure patient and family is okay with discharge before initiating planning process    I had a 10 minute conversation with Mr. Rdz today. He was pleased with her progress and thankful for the care of the medical teams. He again reinforced that he would like his wife to stay until the wounds are more healed. Expressed frustration about not understanding the source of the skin breakdown.

## 2018-07-06 NOTE — PROGRESS NOTE ADULT - SUBJECTIVE AND OBJECTIVE BOX
Patient is a 77y old  Female who presents with a chief complaint of fever, lethargy (07 Jun 2018 22:19)      INTERVAL HPI/OVERNIGHT EVENTS: noted, plastics and PT at bedside changing VAC and wound dressings    Vital Signs Last 24 Hrs  T(C): 36.7 (06 Jul 2018 08:00), Max: 36.8 (05 Jul 2018 22:20)  T(F): 98.1 (06 Jul 2018 08:00), Max: 98.2 (05 Jul 2018 22:20)  HR: 80 (06 Jul 2018 08:00) (68 - 93)  BP: 115/71 (06 Jul 2018 08:00) (105/67 - 115/71)  BP(mean): --  RR: 19 (06 Jul 2018 08:00) (18 - 20)  SpO2: 93% (06 Jul 2018 08:00) (93% - 97%)    acetaminophen   Tablet. 650 milliGRAM(s) Oral every 6 hours PRN  ascorbic acid 500 milliGRAM(s) Oral daily  aspirin enteric coated 81 milliGRAM(s) Oral daily  betamethasone valerate 0.1% Ointment 1 Application(s) Topical two times a day  bisacodyl Suppository 10 milliGRAM(s) Rectal daily PRN  bismuth subsalicylate Liquid 30 milliLiter(s) Oral every 6 hours PRN  buDESOnide 160 MICROgram(s)/formoterol 4.5 MICROgram(s) Inhaler 2 Puff(s) Inhalation two times a day  calcium carbonate 1250 mG + Vitamin D (OsCal 500 + D) 1 Tablet(s) Oral three times a day  Dakins Solution - 1/4 Strength 1 Application(s) Topical two times a day  folic acid 1 milliGRAM(s) Oral daily  gabapentin 200 milliGRAM(s) Oral three times a day  HYDROmorphone   Tablet 2 milliGRAM(s) Oral every 3 hours PRN  HYDROmorphone  Injectable 0.5 milliGRAM(s) IV Push every 4 hours PRN  loratadine 10 milliGRAM(s) Oral daily  magnesium hydroxide Suspension 30 milliLiter(s) Oral daily PRN  melatonin 3 milliGRAM(s) Oral at bedtime  metoprolol tartrate 12.5 milliGRAM(s) Oral two times a day  minocycline 100 milliGRAM(s) Oral two times a day  multivitamin 1 Tablet(s) Oral daily  pantoprazole    Tablet 40 milliGRAM(s) Oral before breakfast  polyethylene glycol 3350 17 Gram(s) Oral two times a day  senna 2 Tablet(s) Oral at bedtime PRN  simethicone 80 milliGRAM(s) Chew two times a day PRN  simvastatin 20 milliGRAM(s) Oral at bedtime  tiotropium 18 MICROgram(s) Capsule 1 Capsule(s) Inhalation daily      PHYSICAL EXAM:  GENERAL: NAD,   EYES: conjunctiva and sclera clear  ENMT: Moist mucous membranes  NECK: Supple, No JVD, Normal thyroid  NERVOUS SYSTEM:  Alert & Oriented X3,   CHEST/LUNG: Clear to auscultation bilaterally; No rales, rhonchi, wheezing, or rubs  HEART: Regular rate and rhythm; No murmurs, rubs, or gallops  ABDOMEN: Soft, Nontender, Nondistended; Bowel sounds present  EXTREMITIES: right medial thigh wound dressing, wound vac to right knee , lower abd , rt thigh wound vac dced  left thigh small wound packed  LYMPH: No lymphadenopathy noted      Consultant(s) Notes Reviewed:  [x ] YES  [ ] NO  Care Discussed with Consultants/Other Providers [ x] YES  [ ] NO    LABS:                        8.9    8.30  )-----------( 217      ( 05 Jul 2018 09:31 )             29.0     07-05    139  |  98  |  24<H>  ----------------------------<  103<H>  4.4   |  27  |  0.78    Ca    9.3      05 Jul 2018 07:24      PT/INR - ( 05 Jul 2018 09:31 )   PT: 40.0 sec;   INR: 3.45 ratio             CAPILLARY BLOOD GLUCOSE                RADIOLOGY & ADDITIONAL TESTS:    Imaging Personally Reviewed:  [x ] YES  [ ] NO

## 2018-07-06 NOTE — PROGRESS NOTE ADULT - ASSESSMENT
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 completed 10 days meropenem.    # abd wound, multiple bl leg wounds  continue vac to abdomen, rt posterior thigh and rt Knee, vac changes M/W/F  appreciate wound care recs, cont NS irrigation, cavilon, medihoney  pain control  6/22 left thigh and left arm punch biopsies resulted and consistent with urticaria. appreciate allergy recs. pt currently asymptomatic so will hold off antihistamine  tissue culture grew coag neg staph which can be normal roopa / colonized  appreciate derm recs    # polymyalgia rheumatica  steroids on hold to allow for wound healing  appreciate rheum recs    #TKR wound-mrsa  chr minocycline  ID following    # Pain control  bowel regimen  constipation, increase miralax and add milk of mag and tap water enema if no BM    # chr Anemia  stable, monitor h/h closely    # S/P TAVR (transcatheter aortic valve replacement)  continue Coumadin, daily INR    # adjustment disorder with depressed mood  xanax prn    dispo:   discharge has been difficult as family has unrealistic expectation to stay in the hospital until wounds heal.    fu knee xray: reviewed-no acute changes  ortho recs appreciated  pt cleared for discharge by all subspecialities-ortho, plastics, derm, rheum, allergy, ID, surgery and wound care  d/w pt and dgtr at bedside today- who doesn't even want to hear about discharge-  they are concerned about not getting the kind of wound care at the rehab that she is receiving in the hospital and reluctant to hear anything in favor of rehab

## 2018-07-06 NOTE — PROGRESS NOTE ADULT - SUBJECTIVE AND OBJECTIVE BOX
Plastic Surgery Progress Note (pg [facility]: [id number], [facility]: [phone])    SUBJECTIVE:  The patient was seen and examined. No acute events overnight. Pt c/o overall pain for which current pain regimen is not alleviating. Wound vacs were changed today.    OBJECTIVE:     ** VITAL SIGNS / I&O's **    Vital Signs Last 24 Hrs  T(C): 36.7 (06 Jul 2018 08:00), Max: 36.9 (05 Jul 2018 13:54)  T(F): 98.1 (06 Jul 2018 08:00), Max: 98.4 (05 Jul 2018 13:54)  HR: 80 (06 Jul 2018 08:00) (68 - 93)  BP: 115/71 (06 Jul 2018 08:00) (105/67 - 115/71)  BP(mean): --  RR: 19 (06 Jul 2018 08:00) (18 - 20)  SpO2: 93% (06 Jul 2018 08:00) (92% - 97%)      05 Jul 2018 07:01  -  06 Jul 2018 07:00  --------------------------------------------------------  IN:    Oral Fluid: 420 mL  Total IN: 420 mL    OUT:  Total OUT: 0 mL    Total NET: 420 mL          ** PHYSICAL EXAM **    -- CONSTITUTIONAL: Alert, NAD   -- PULM: non-labored respirations  -- ABDOMEN: central abd VAC intact and set to suction.   -- EXTREMITIES: R lateral thigh wound had a foul odor, vac was not replaced, wet to dry dressing applied. R medial thigh is slightly erythematous possibly from adhesive tape. R knee vac is holding suction.     ** LABS **                          8.9    8.30  )-----------( 217      ( 05 Jul 2018 09:31 )             29.0     05 Jul 2018 07:24    139    |  98     |  24     ----------------------------<  103    4.4     |  27     |  0.78     Ca    9.3        05 Jul 2018 07:24      PT/INR - ( 05 Jul 2018 09:31 )   PT: 40.0 sec;   INR: 3.45 ratio           CAPILLARY BLOOD GLUCOSE                    ** MEDICATIONS **  MEDICATIONS  (STANDING):  ascorbic acid 500 milliGRAM(s) Oral daily  aspirin enteric coated 81 milliGRAM(s) Oral daily  betamethasone valerate 0.1% Ointment 1 Application(s) Topical two times a day  buDESOnide 160 MICROgram(s)/formoterol 4.5 MICROgram(s) Inhaler 2 Puff(s) Inhalation two times a day  calcium carbonate 1250 mG + Vitamin D (OsCal 500 + D) 1 Tablet(s) Oral three times a day  folic acid 1 milliGRAM(s) Oral daily  gabapentin 200 milliGRAM(s) Oral three times a day  loratadine 10 milliGRAM(s) Oral daily  melatonin 3 milliGRAM(s) Oral at bedtime  metoprolol tartrate 12.5 milliGRAM(s) Oral two times a day  minocycline 100 milliGRAM(s) Oral two times a day  multivitamin 1 Tablet(s) Oral daily  pantoprazole    Tablet 40 milliGRAM(s) Oral before breakfast  polyethylene glycol 3350 17 Gram(s) Oral two times a day  simvastatin 20 milliGRAM(s) Oral at bedtime  tiotropium 18 MICROgram(s) Capsule 1 Capsule(s) Inhalation daily    MEDICATIONS  (PRN):  acetaminophen   Tablet. 650 milliGRAM(s) Oral every 6 hours PRN Mild Pain (1 - 3)  bisacodyl Suppository 10 milliGRAM(s) Rectal daily PRN Constipation  bismuth subsalicylate Liquid 30 milliLiter(s) Oral every 6 hours PRN Cramping/abd pain  HYDROmorphone   Tablet 2 milliGRAM(s) Oral every 3 hours PRN Moderate Pain (4 - 6)  HYDROmorphone  Injectable 0.5 milliGRAM(s) IV Push every 4 hours PRN Severe Pain (7 - 10)  magnesium hydroxide Suspension 30 milliLiter(s) Oral daily PRN Constipation  senna 2 Tablet(s) Oral at bedtime PRN Constipation  simethicone 80 milliGRAM(s) Chew two times a day PRN Gas

## 2018-07-07 LAB
INR BLD: 2.72 RATIO — HIGH (ref 0.88–1.16)
PROTHROM AB SERPL-ACNC: 31.4 SEC — HIGH (ref 10–13.1)

## 2018-07-07 RX ORDER — ALPRAZOLAM 0.25 MG
0.25 TABLET ORAL ONCE
Qty: 0 | Refills: 0 | Status: DISCONTINUED | OUTPATIENT
Start: 2018-07-07 | End: 2018-07-18

## 2018-07-07 RX ADMIN — MINOCYCLINE HYDROCHLORIDE 100 MILLIGRAM(S): 45 TABLET, EXTENDED RELEASE ORAL at 17:48

## 2018-07-07 RX ADMIN — GABAPENTIN 200 MILLIGRAM(S): 400 CAPSULE ORAL at 06:19

## 2018-07-07 RX ADMIN — Medication 81 MILLIGRAM(S): at 12:51

## 2018-07-07 RX ADMIN — Medication 3 MILLIGRAM(S): at 21:04

## 2018-07-07 RX ADMIN — Medication 1 TABLET(S): at 06:19

## 2018-07-07 RX ADMIN — GABAPENTIN 200 MILLIGRAM(S): 400 CAPSULE ORAL at 13:44

## 2018-07-07 RX ADMIN — Medication 1 APPLICATION(S): at 07:25

## 2018-07-07 RX ADMIN — SIMVASTATIN 20 MILLIGRAM(S): 20 TABLET, FILM COATED ORAL at 21:04

## 2018-07-07 RX ADMIN — MINOCYCLINE HYDROCHLORIDE 100 MILLIGRAM(S): 45 TABLET, EXTENDED RELEASE ORAL at 06:19

## 2018-07-07 RX ADMIN — Medication 1 APPLICATION(S): at 17:50

## 2018-07-07 RX ADMIN — Medication 1 APPLICATION(S): at 17:51

## 2018-07-07 RX ADMIN — Medication 1 TABLET(S): at 21:04

## 2018-07-07 RX ADMIN — Medication 1 TABLET(S): at 13:44

## 2018-07-07 RX ADMIN — HYDROMORPHONE HYDROCHLORIDE 2 MILLIGRAM(S): 2 INJECTION INTRAMUSCULAR; INTRAVENOUS; SUBCUTANEOUS at 14:09

## 2018-07-07 RX ADMIN — Medication 12.5 MILLIGRAM(S): at 06:21

## 2018-07-07 RX ADMIN — PANTOPRAZOLE SODIUM 40 MILLIGRAM(S): 20 TABLET, DELAYED RELEASE ORAL at 06:19

## 2018-07-07 RX ADMIN — Medication 650 MILLIGRAM(S): at 10:33

## 2018-07-07 RX ADMIN — HYDROMORPHONE HYDROCHLORIDE 0.5 MILLIGRAM(S): 2 INJECTION INTRAMUSCULAR; INTRAVENOUS; SUBCUTANEOUS at 21:12

## 2018-07-07 RX ADMIN — Medication 500 MILLIGRAM(S): at 12:49

## 2018-07-07 RX ADMIN — Medication 1 APPLICATION(S): at 06:19

## 2018-07-07 RX ADMIN — HYDROMORPHONE HYDROCHLORIDE 0.5 MILLIGRAM(S): 2 INJECTION INTRAMUSCULAR; INTRAVENOUS; SUBCUTANEOUS at 06:05

## 2018-07-07 RX ADMIN — HYDROMORPHONE HYDROCHLORIDE 2 MILLIGRAM(S): 2 INJECTION INTRAMUSCULAR; INTRAVENOUS; SUBCUTANEOUS at 13:09

## 2018-07-07 RX ADMIN — Medication 650 MILLIGRAM(S): at 09:33

## 2018-07-07 RX ADMIN — Medication 650 MILLIGRAM(S): at 14:57

## 2018-07-07 RX ADMIN — BUDESONIDE AND FORMOTEROL FUMARATE DIHYDRATE 2 PUFF(S): 160; 4.5 AEROSOL RESPIRATORY (INHALATION) at 06:20

## 2018-07-07 RX ADMIN — HYDROMORPHONE HYDROCHLORIDE 2 MILLIGRAM(S): 2 INJECTION INTRAMUSCULAR; INTRAVENOUS; SUBCUTANEOUS at 18:30

## 2018-07-07 RX ADMIN — HYDROMORPHONE HYDROCHLORIDE 0.5 MILLIGRAM(S): 2 INJECTION INTRAMUSCULAR; INTRAVENOUS; SUBCUTANEOUS at 21:02

## 2018-07-07 RX ADMIN — HYDROMORPHONE HYDROCHLORIDE 2 MILLIGRAM(S): 2 INJECTION INTRAMUSCULAR; INTRAVENOUS; SUBCUTANEOUS at 17:46

## 2018-07-07 RX ADMIN — HYDROMORPHONE HYDROCHLORIDE 0.5 MILLIGRAM(S): 2 INJECTION INTRAMUSCULAR; INTRAVENOUS; SUBCUTANEOUS at 06:46

## 2018-07-07 RX ADMIN — BUDESONIDE AND FORMOTEROL FUMARATE DIHYDRATE 2 PUFF(S): 160; 4.5 AEROSOL RESPIRATORY (INHALATION) at 17:47

## 2018-07-07 RX ADMIN — TIOTROPIUM BROMIDE 1 CAPSULE(S): 18 CAPSULE ORAL; RESPIRATORY (INHALATION) at 12:52

## 2018-07-07 RX ADMIN — Medication 650 MILLIGRAM(S): at 16:00

## 2018-07-07 RX ADMIN — Medication 1 MILLIGRAM(S): at 12:50

## 2018-07-07 RX ADMIN — Medication 1 TABLET(S): at 12:49

## 2018-07-07 RX ADMIN — GABAPENTIN 200 MILLIGRAM(S): 400 CAPSULE ORAL at 21:04

## 2018-07-07 RX ADMIN — LORATADINE 10 MILLIGRAM(S): 10 TABLET ORAL at 12:50

## 2018-07-07 RX ADMIN — Medication 12.5 MILLIGRAM(S): at 17:47

## 2018-07-07 NOTE — PROGRESS NOTE ADULT - SUBJECTIVE AND OBJECTIVE BOX
Patient is a 77y old  Female who presents with a chief complaint of fever, lethargy (07 Jun 2018 22:19)      INTERVAL HPI/OVERNIGHT EVENTS: noted, feels well      Vital Signs Last 24 Hrs  T(C): 36.6 (07 Jul 2018 11:41), Max: 37.2 (06 Jul 2018 20:26)  T(F): 97.9 (07 Jul 2018 11:41), Max: 99 (06 Jul 2018 20:26)  HR: 67 (07 Jul 2018 11:41) (67 - 101)  BP: 135/83 (07 Jul 2018 11:41) (101/67 - 135/83)  BP(mean): --  RR: 18 (07 Jul 2018 11:41) (18 - 20)  SpO2: 94% (07 Jul 2018 11:41) (94% - 97%)    acetaminophen   Tablet. 650 milliGRAM(s) Oral every 6 hours PRN  ascorbic acid 500 milliGRAM(s) Oral daily  aspirin enteric coated 81 milliGRAM(s) Oral daily  betamethasone valerate 0.1% Ointment 1 Application(s) Topical two times a day  bisacodyl Suppository 10 milliGRAM(s) Rectal daily PRN  bismuth subsalicylate Liquid 30 milliLiter(s) Oral every 6 hours PRN  buDESOnide 160 MICROgram(s)/formoterol 4.5 MICROgram(s) Inhaler 2 Puff(s) Inhalation two times a day  calcium carbonate 1250 mG + Vitamin D (OsCal 500 + D) 1 Tablet(s) Oral three times a day  Dakins Solution - 1/4 Strength 1 Application(s) Topical two times a day  folic acid 1 milliGRAM(s) Oral daily  gabapentin 200 milliGRAM(s) Oral three times a day  HYDROmorphone   Tablet 2 milliGRAM(s) Oral every 3 hours PRN  HYDROmorphone  Injectable 0.5 milliGRAM(s) IV Push every 4 hours PRN  loratadine 10 milliGRAM(s) Oral daily  magnesium hydroxide Suspension 30 milliLiter(s) Oral daily PRN  melatonin 3 milliGRAM(s) Oral at bedtime  metoprolol tartrate 12.5 milliGRAM(s) Oral two times a day  minocycline 100 milliGRAM(s) Oral two times a day  multivitamin 1 Tablet(s) Oral daily  pantoprazole    Tablet 40 milliGRAM(s) Oral before breakfast  polyethylene glycol 3350 17 Gram(s) Oral two times a day  senna 2 Tablet(s) Oral at bedtime PRN  simethicone 80 milliGRAM(s) Chew two times a day PRN  simvastatin 20 milliGRAM(s) Oral at bedtime  tiotropium 18 MICROgram(s) Capsule 1 Capsule(s) Inhalation daily      PHYSICAL EXAM:  GENERAL: NAD,   EYES: conjunctiva and sclera clear  ENMT: Moist mucous membranes  NECK: Supple, No JVD, Normal thyroid  NERVOUS SYSTEM:  Alert & Oriented X3,   CHEST/LUNG: Clear to auscultation bilaterally; No rales, rhonchi, wheezing, or rubs  HEART: Regular rate and rhythm; No murmurs, rubs, or gallops  ABDOMEN: Soft, Nontender, Nondistended; Bowel sounds present  EXTREMITIES:  2+ Peripheral Pulses, No clubbing, cyanosis, or edema  LYMPH: No lymphadenopathy noted  SKIN: No rashes or lesions    Consultant(s) Notes Reviewed:  [x ] YES  [ ] NO  Care Discussed with Consultants/Other Providers [ x] YES  [ ] NO    LABS:          PT/INR - ( 07 Jul 2018 08:28 )   PT: 31.4 sec;   INR: 2.72 ratio             CAPILLARY BLOOD GLUCOSE                RADIOLOGY & ADDITIONAL TESTS:    Imaging Personally Reviewed:  [x ] YES  [ ] NO Patient is a 77y old  Female who presents with a chief complaint of fever, lethargy (07 Jun 2018 22:19)      INTERVAL HPI/OVERNIGHT EVENTS: noted, feels well      Vital Signs Last 24 Hrs  T(C): 36.6 (07 Jul 2018 11:41), Max: 37.2 (06 Jul 2018 20:26)  T(F): 97.9 (07 Jul 2018 11:41), Max: 99 (06 Jul 2018 20:26)  HR: 67 (07 Jul 2018 11:41) (67 - 101)  BP: 135/83 (07 Jul 2018 11:41) (101/67 - 135/83)  BP(mean): --  RR: 18 (07 Jul 2018 11:41) (18 - 20)  SpO2: 94% (07 Jul 2018 11:41) (94% - 97%)    acetaminophen   Tablet. 650 milliGRAM(s) Oral every 6 hours PRN  ascorbic acid 500 milliGRAM(s) Oral daily  aspirin enteric coated 81 milliGRAM(s) Oral daily  betamethasone valerate 0.1% Ointment 1 Application(s) Topical two times a day  bisacodyl Suppository 10 milliGRAM(s) Rectal daily PRN  bismuth subsalicylate Liquid 30 milliLiter(s) Oral every 6 hours PRN  buDESOnide 160 MICROgram(s)/formoterol 4.5 MICROgram(s) Inhaler 2 Puff(s) Inhalation two times a day  calcium carbonate 1250 mG + Vitamin D (OsCal 500 + D) 1 Tablet(s) Oral three times a day  Dakins Solution - 1/4 Strength 1 Application(s) Topical two times a day  folic acid 1 milliGRAM(s) Oral daily  gabapentin 200 milliGRAM(s) Oral three times a day  HYDROmorphone   Tablet 2 milliGRAM(s) Oral every 3 hours PRN  HYDROmorphone  Injectable 0.5 milliGRAM(s) IV Push every 4 hours PRN  loratadine 10 milliGRAM(s) Oral daily  magnesium hydroxide Suspension 30 milliLiter(s) Oral daily PRN  melatonin 3 milliGRAM(s) Oral at bedtime  metoprolol tartrate 12.5 milliGRAM(s) Oral two times a day  minocycline 100 milliGRAM(s) Oral two times a day  multivitamin 1 Tablet(s) Oral daily  pantoprazole    Tablet 40 milliGRAM(s) Oral before breakfast  polyethylene glycol 3350 17 Gram(s) Oral two times a day  senna 2 Tablet(s) Oral at bedtime PRN  simethicone 80 milliGRAM(s) Chew two times a day PRN  simvastatin 20 milliGRAM(s) Oral at bedtime  tiotropium 18 MICROgram(s) Capsule 1 Capsule(s) Inhalation daily      PHYSICAL EXAM:  GENERAL: NAD,   EYES: conjunctiva and sclera clear  ENMT: Moist mucous membranes  NECK: Supple, No JVD, Normal thyroid  NERVOUS SYSTEM:  Alert & Oriented X3,   CHEST/LUNG: Clear to auscultation bilaterally; No rales, rhonchi, wheezing, or rubs  HEART: Regular rate and rhythm; No murmurs, rubs, or gallops  ABDOMEN: Soft, Nontender, Nondistended; Bowel sounds present  EXTREMITIES:  2+ Peripheral Pulses, No clubbing, cyanosis, or edema  right medial thigh wound dressing, wound vac to right knee , lower abd , rt thigh wound vac dced  left thigh small wound packed  LYMPH: No lymphadenopathy noted  SKIN: No rashes or lesions    Consultant(s) Notes Reviewed:  [x ] YES  [ ] NO  Care Discussed with Consultants/Other Providers [ x] YES  [ ] NO    LABS:          PT/INR - ( 07 Jul 2018 08:28 )   PT: 31.4 sec;   INR: 2.72 ratio             CAPILLARY BLOOD GLUCOSE                RADIOLOGY & ADDITIONAL TESTS:    Imaging Personally Reviewed:  [x ] YES  [ ] NO

## 2018-07-07 NOTE — PROGRESS NOTE ADULT - ASSESSMENT
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 completed 10 days meropenem.    # abd wound, multiple bl leg wounds  continue vac to abdomen, rt posterior thigh and rt Knee, vac changes M/W/F  appreciate wound care recs, cont NS irrigation, cavilon, medihoney  pain control  6/22 left thigh and left arm punch biopsies resulted and consistent with urticaria. appreciate allergy recs. pt currently asymptomatic so will hold off antihistamine  tissue culture grew coag neg staph which can be normal roopa / colonized  appreciate derm recs    # polymyalgia rheumatica  steroids on hold to allow for wound healing  appreciate rheum recs    #TKR wound-mrsa  chr minocycline  ID following    # Pain control  bowel regimen  constipation, increase miralax and add milk of mag and tap water enema if no BM    # chr Anemia  stable, monitor h/h closely    # S/P TAVR (transcatheter aortic valve replacement)  continue Coumadin, daily INR    # adjustment disorder with depressed mood  xanax prn    dispo:   discharge has been difficult as family has unrealistic expectation to stay in the hospital until wounds heal.    fu knee xray: reviewed-no acute changes  ortho recs appreciated  pt cleared for discharge by all subspecialities-ortho, plastics, derm, rheum, allergy, ID, surgery and wound care  d/w pt and dgtr at bedside today- who doesn't even want to hear about discharge-  they are concerned about not getting the kind of wound care at the rehab that she is receiving in the hospital and reluctant to hear anything in favor of rehab

## 2018-07-08 LAB
ANION GAP SERPL CALC-SCNC: 14 MMOL/L — SIGNIFICANT CHANGE UP (ref 5–17)
BUN SERPL-MCNC: 29 MG/DL — HIGH (ref 7–23)
CALCIUM SERPL-MCNC: 9.2 MG/DL — SIGNIFICANT CHANGE UP (ref 8.4–10.5)
CHLORIDE SERPL-SCNC: 100 MMOL/L — SIGNIFICANT CHANGE UP (ref 96–108)
CO2 SERPL-SCNC: 27 MMOL/L — SIGNIFICANT CHANGE UP (ref 22–31)
CREAT SERPL-MCNC: 0.79 MG/DL — SIGNIFICANT CHANGE UP (ref 0.5–1.3)
GLUCOSE SERPL-MCNC: 103 MG/DL — HIGH (ref 70–99)
HCT VFR BLD CALC: 28.1 % — LOW (ref 34.5–45)
HGB BLD-MCNC: 8.6 G/DL — LOW (ref 11.5–15.5)
INR BLD: 2.25 RATIO — HIGH (ref 0.88–1.16)
MCHC RBC-ENTMCNC: 26.7 PG — LOW (ref 27–34)
MCHC RBC-ENTMCNC: 30.6 GM/DL — LOW (ref 32–36)
MCV RBC AUTO: 87.3 FL — SIGNIFICANT CHANGE UP (ref 80–100)
PLATELET # BLD AUTO: 240 K/UL — SIGNIFICANT CHANGE UP (ref 150–400)
POTASSIUM SERPL-MCNC: 4.4 MMOL/L — SIGNIFICANT CHANGE UP (ref 3.5–5.3)
POTASSIUM SERPL-SCNC: 4.4 MMOL/L — SIGNIFICANT CHANGE UP (ref 3.5–5.3)
PROTHROM AB SERPL-ACNC: 25.8 SEC — HIGH (ref 10–13.1)
RBC # BLD: 3.22 M/UL — LOW (ref 3.8–5.2)
RBC # FLD: 17.4 % — HIGH (ref 10.3–14.5)
SODIUM SERPL-SCNC: 141 MMOL/L — SIGNIFICANT CHANGE UP (ref 135–145)
WBC # BLD: 7.6 K/UL — SIGNIFICANT CHANGE UP (ref 3.8–10.5)
WBC # FLD AUTO: 7.6 K/UL — SIGNIFICANT CHANGE UP (ref 3.8–10.5)

## 2018-07-08 RX ADMIN — Medication 500 MILLIGRAM(S): at 12:45

## 2018-07-08 RX ADMIN — Medication 1 TABLET(S): at 12:46

## 2018-07-08 RX ADMIN — BUDESONIDE AND FORMOTEROL FUMARATE DIHYDRATE 2 PUFF(S): 160; 4.5 AEROSOL RESPIRATORY (INHALATION) at 17:01

## 2018-07-08 RX ADMIN — Medication 12.5 MILLIGRAM(S): at 17:02

## 2018-07-08 RX ADMIN — Medication 1 APPLICATION(S): at 17:00

## 2018-07-08 RX ADMIN — Medication 1 APPLICATION(S): at 05:35

## 2018-07-08 RX ADMIN — Medication 1 TABLET(S): at 12:44

## 2018-07-08 RX ADMIN — SIMETHICONE 80 MILLIGRAM(S): 80 TABLET, CHEWABLE ORAL at 12:57

## 2018-07-08 RX ADMIN — GABAPENTIN 200 MILLIGRAM(S): 400 CAPSULE ORAL at 21:25

## 2018-07-08 RX ADMIN — LORATADINE 10 MILLIGRAM(S): 10 TABLET ORAL at 12:47

## 2018-07-08 RX ADMIN — SIMVASTATIN 20 MILLIGRAM(S): 20 TABLET, FILM COATED ORAL at 21:24

## 2018-07-08 RX ADMIN — MINOCYCLINE HYDROCHLORIDE 100 MILLIGRAM(S): 45 TABLET, EXTENDED RELEASE ORAL at 17:01

## 2018-07-08 RX ADMIN — Medication 1 APPLICATION(S): at 17:02

## 2018-07-08 RX ADMIN — GABAPENTIN 200 MILLIGRAM(S): 400 CAPSULE ORAL at 05:36

## 2018-07-08 RX ADMIN — HYDROMORPHONE HYDROCHLORIDE 0.5 MILLIGRAM(S): 2 INJECTION INTRAMUSCULAR; INTRAVENOUS; SUBCUTANEOUS at 04:57

## 2018-07-08 RX ADMIN — BUDESONIDE AND FORMOTEROL FUMARATE DIHYDRATE 2 PUFF(S): 160; 4.5 AEROSOL RESPIRATORY (INHALATION) at 05:36

## 2018-07-08 RX ADMIN — GABAPENTIN 200 MILLIGRAM(S): 400 CAPSULE ORAL at 12:43

## 2018-07-08 RX ADMIN — POLYETHYLENE GLYCOL 3350 17 GRAM(S): 17 POWDER, FOR SOLUTION ORAL at 05:36

## 2018-07-08 RX ADMIN — HYDROMORPHONE HYDROCHLORIDE 0.5 MILLIGRAM(S): 2 INJECTION INTRAMUSCULAR; INTRAVENOUS; SUBCUTANEOUS at 13:01

## 2018-07-08 RX ADMIN — MINOCYCLINE HYDROCHLORIDE 100 MILLIGRAM(S): 45 TABLET, EXTENDED RELEASE ORAL at 05:36

## 2018-07-08 RX ADMIN — Medication 1 TABLET(S): at 05:36

## 2018-07-08 RX ADMIN — TIOTROPIUM BROMIDE 1 CAPSULE(S): 18 CAPSULE ORAL; RESPIRATORY (INHALATION) at 12:47

## 2018-07-08 RX ADMIN — HYDROMORPHONE HYDROCHLORIDE 0.5 MILLIGRAM(S): 2 INJECTION INTRAMUSCULAR; INTRAVENOUS; SUBCUTANEOUS at 05:07

## 2018-07-08 RX ADMIN — HYDROMORPHONE HYDROCHLORIDE 2 MILLIGRAM(S): 2 INJECTION INTRAMUSCULAR; INTRAVENOUS; SUBCUTANEOUS at 20:50

## 2018-07-08 RX ADMIN — HYDROMORPHONE HYDROCHLORIDE 2 MILLIGRAM(S): 2 INJECTION INTRAMUSCULAR; INTRAVENOUS; SUBCUTANEOUS at 11:58

## 2018-07-08 RX ADMIN — HYDROMORPHONE HYDROCHLORIDE 2 MILLIGRAM(S): 2 INJECTION INTRAMUSCULAR; INTRAVENOUS; SUBCUTANEOUS at 20:20

## 2018-07-08 RX ADMIN — HYDROMORPHONE HYDROCHLORIDE 0.5 MILLIGRAM(S): 2 INJECTION INTRAMUSCULAR; INTRAVENOUS; SUBCUTANEOUS at 13:15

## 2018-07-08 RX ADMIN — Medication 650 MILLIGRAM(S): at 05:57

## 2018-07-08 RX ADMIN — HYDROMORPHONE HYDROCHLORIDE 2 MILLIGRAM(S): 2 INJECTION INTRAMUSCULAR; INTRAVENOUS; SUBCUTANEOUS at 10:42

## 2018-07-08 RX ADMIN — PANTOPRAZOLE SODIUM 40 MILLIGRAM(S): 20 TABLET, DELAYED RELEASE ORAL at 05:36

## 2018-07-08 RX ADMIN — Medication 1 TABLET(S): at 21:24

## 2018-07-08 RX ADMIN — Medication 12.5 MILLIGRAM(S): at 05:36

## 2018-07-08 RX ADMIN — Medication 3 MILLIGRAM(S): at 21:24

## 2018-07-08 RX ADMIN — Medication 81 MILLIGRAM(S): at 12:44

## 2018-07-08 RX ADMIN — Medication 1 MILLIGRAM(S): at 12:47

## 2018-07-08 NOTE — PROGRESS NOTE ADULT - ASSESSMENT
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 completed 10 days meropenem.    # abd wound, multiple bl leg wounds  continue vac to abdomen, rt posterior thigh and rt Knee, vac changes M/W/F  appreciate wound care recs, cont NS irrigation, cavilon, medihoney  pain control  6/22 left thigh and left arm punch biopsies resulted and consistent with urticaria. appreciate allergy recs. pt currently asymptomatic so will hold off antihistamine  tissue culture grew coag neg staph which can be normal roopa / colonized  appreciate derm recs    # polymyalgia rheumatica  steroids on hold to allow for wound healing  appreciate rheum recs    #TKR wound-mrsa  chr minocycline  ID following    # Pain control  bowel regimen  constipation, increase miralax and add milk of mag and tap water enema if no BM    # chr Anemia  stable, monitor h/h closely    # S/P TAVR (transcatheter aortic valve replacement)  continue Coumadin, daily INR    # adjustment disorder with depressed mood  xanax prn    dispo:   discharge has been difficult as family has unrealistic expectation to stay in the hospital until wounds heal.    fu knee xray: reviewed-no acute changes  ortho recs appreciated  pt cleared for discharge by all subspecialities-ortho, plastics, derm, rheum, allergy, ID, surgery and wound care  d/w pt and dgtr at bedside today- who doesn't even want to hear about discharge-  they are concerned about not getting the kind of wound care at the rehab that she is receiving in the hospital and reluctant to hear anything in favor of rehab

## 2018-07-08 NOTE — PROGRESS NOTE ADULT - SUBJECTIVE AND OBJECTIVE BOX
Patient is a 77y old  Female who presents with a chief complaint of fever, lethargy (07 Jun 2018 22:19)      INTERVAL HPI/OVERNIGHT EVENTS:none, feels well      Vital Signs Last 24 Hrs  T(C): 36.7 (08 Jul 2018 05:00), Max: 36.7 (07 Jul 2018 14:43)  T(F): 98.1 (08 Jul 2018 05:00), Max: 98.1 (08 Jul 2018 05:00)  HR: 97 (08 Jul 2018 05:00) (76 - 97)  BP: 110/71 (08 Jul 2018 05:00) (110/71 - 112/77)  BP(mean): --  RR: 18 (08 Jul 2018 05:00) (18 - 18)  SpO2: 97% (08 Jul 2018 05:00) (94% - 97%)    acetaminophen   Tablet. 650 milliGRAM(s) Oral every 6 hours PRN  ALPRAZolam 0.25 milliGRAM(s) Oral once  ascorbic acid 500 milliGRAM(s) Oral daily  aspirin enteric coated 81 milliGRAM(s) Oral daily  betamethasone valerate 0.1% Ointment 1 Application(s) Topical two times a day  bisacodyl Suppository 10 milliGRAM(s) Rectal daily PRN  bismuth subsalicylate Liquid 30 milliLiter(s) Oral every 6 hours PRN  buDESOnide 160 MICROgram(s)/formoterol 4.5 MICROgram(s) Inhaler 2 Puff(s) Inhalation two times a day  calcium carbonate 1250 mG + Vitamin D (OsCal 500 + D) 1 Tablet(s) Oral three times a day  Dakins Solution - 1/4 Strength 1 Application(s) Topical two times a day  folic acid 1 milliGRAM(s) Oral daily  gabapentin 200 milliGRAM(s) Oral three times a day  HYDROmorphone   Tablet 2 milliGRAM(s) Oral every 3 hours PRN  HYDROmorphone  Injectable 0.5 milliGRAM(s) IV Push every 4 hours PRN  loratadine 10 milliGRAM(s) Oral daily  magnesium hydroxide Suspension 30 milliLiter(s) Oral daily PRN  melatonin 3 milliGRAM(s) Oral at bedtime  metoprolol tartrate 12.5 milliGRAM(s) Oral two times a day  minocycline 100 milliGRAM(s) Oral two times a day  multivitamin 1 Tablet(s) Oral daily  pantoprazole    Tablet 40 milliGRAM(s) Oral before breakfast  polyethylene glycol 3350 17 Gram(s) Oral two times a day  senna 2 Tablet(s) Oral at bedtime PRN  simethicone 80 milliGRAM(s) Chew two times a day PRN  simvastatin 20 milliGRAM(s) Oral at bedtime  tiotropium 18 MICROgram(s) Capsule 1 Capsule(s) Inhalation daily      PHYSICAL EXAM:  GENERAL: NAD,   EYES: conjunctiva and sclera clear  ENMT: Moist mucous membranes  NECK: Supple, No JVD, Normal thyroid  NERVOUS SYSTEM:  Alert & Oriented X3,   CHEST/LUNG: Clear to auscultation bilaterally; No rales, rhonchi, wheezing, or rubs  HEART: Regular rate and rhythm; No murmurs, rubs, or gallops  ABDOMEN: Soft, Nontender, Nondistended; Bowel sounds present  EXTREMITIES:   right medial thigh wound dressing, wound vac to right knee , lower abd , rt thigh wound vac dced  left thigh small wound packed  LYMPH: No lymphadenopathy noted  SKIN: No rashes or lesions    Consultant(s) Notes Reviewed:  [x ] YES  [ ] NO  Care Discussed with Consultants/Other Providers [ x] YES  [ ] NO    LABS:                        8.6    7.60  )-----------( 240      ( 08 Jul 2018 08:15 )             28.1     07-08    141  |  100  |  29<H>  ----------------------------<  103<H>  4.4   |  27  |  0.79    Ca    9.2      08 Jul 2018 06:55      PT/INR - ( 08 Jul 2018 08:15 )   PT: 25.8 sec;   INR: 2.25 ratio             CAPILLARY BLOOD GLUCOSE                RADIOLOGY & ADDITIONAL TESTS:    Imaging Personally Reviewed:  [ x] YES  [ ] NO

## 2018-07-09 LAB
ANION GAP SERPL CALC-SCNC: 13 MMOL/L — SIGNIFICANT CHANGE UP (ref 5–17)
BUN SERPL-MCNC: 29 MG/DL — HIGH (ref 7–23)
CALCIUM SERPL-MCNC: 9.3 MG/DL — SIGNIFICANT CHANGE UP (ref 8.4–10.5)
CHLORIDE SERPL-SCNC: 99 MMOL/L — SIGNIFICANT CHANGE UP (ref 96–108)
CO2 SERPL-SCNC: 29 MMOL/L — SIGNIFICANT CHANGE UP (ref 22–31)
CREAT SERPL-MCNC: 0.83 MG/DL — SIGNIFICANT CHANGE UP (ref 0.5–1.3)
GLUCOSE SERPL-MCNC: 103 MG/DL — HIGH (ref 70–99)
HCT VFR BLD CALC: 28.2 % — LOW (ref 34.5–45)
HGB BLD-MCNC: 8.3 G/DL — LOW (ref 11.5–15.5)
INR BLD: 1.7 RATIO — HIGH (ref 0.88–1.16)
MCHC RBC-ENTMCNC: 25.9 PG — LOW (ref 27–34)
MCHC RBC-ENTMCNC: 29.4 GM/DL — LOW (ref 32–36)
MCV RBC AUTO: 87.9 FL — SIGNIFICANT CHANGE UP (ref 80–100)
PLATELET # BLD AUTO: 267 K/UL — SIGNIFICANT CHANGE UP (ref 150–400)
POTASSIUM SERPL-MCNC: 4.4 MMOL/L — SIGNIFICANT CHANGE UP (ref 3.5–5.3)
POTASSIUM SERPL-SCNC: 4.4 MMOL/L — SIGNIFICANT CHANGE UP (ref 3.5–5.3)
PROTHROM AB SERPL-ACNC: 19.4 SEC — HIGH (ref 10–13.1)
RBC # BLD: 3.21 M/UL — LOW (ref 3.8–5.2)
RBC # FLD: 17.6 % — HIGH (ref 10.3–14.5)
SODIUM SERPL-SCNC: 141 MMOL/L — SIGNIFICANT CHANGE UP (ref 135–145)
WBC # BLD: 8.12 K/UL — SIGNIFICANT CHANGE UP (ref 3.8–10.5)
WBC # FLD AUTO: 8.12 K/UL — SIGNIFICANT CHANGE UP (ref 3.8–10.5)

## 2018-07-09 PROCEDURE — 99232 SBSQ HOSP IP/OBS MODERATE 35: CPT

## 2018-07-09 PROCEDURE — 97606 NEG PRS WND THER DME>50 SQCM: CPT

## 2018-07-09 PROCEDURE — 99232 SBSQ HOSP IP/OBS MODERATE 35: CPT | Mod: 25

## 2018-07-09 RX ORDER — WARFARIN SODIUM 2.5 MG/1
1 TABLET ORAL ONCE
Qty: 0 | Refills: 0 | Status: COMPLETED | OUTPATIENT
Start: 2018-07-09 | End: 2018-07-09

## 2018-07-09 RX ADMIN — HYDROMORPHONE HYDROCHLORIDE 0.5 MILLIGRAM(S): 2 INJECTION INTRAMUSCULAR; INTRAVENOUS; SUBCUTANEOUS at 15:59

## 2018-07-09 RX ADMIN — HYDROMORPHONE HYDROCHLORIDE 0.5 MILLIGRAM(S): 2 INJECTION INTRAMUSCULAR; INTRAVENOUS; SUBCUTANEOUS at 05:45

## 2018-07-09 RX ADMIN — GABAPENTIN 200 MILLIGRAM(S): 400 CAPSULE ORAL at 05:31

## 2018-07-09 RX ADMIN — MINOCYCLINE HYDROCHLORIDE 100 MILLIGRAM(S): 45 TABLET, EXTENDED RELEASE ORAL at 17:44

## 2018-07-09 RX ADMIN — Medication 650 MILLIGRAM(S): at 22:00

## 2018-07-09 RX ADMIN — BUDESONIDE AND FORMOTEROL FUMARATE DIHYDRATE 2 PUFF(S): 160; 4.5 AEROSOL RESPIRATORY (INHALATION) at 17:44

## 2018-07-09 RX ADMIN — PANTOPRAZOLE SODIUM 40 MILLIGRAM(S): 20 TABLET, DELAYED RELEASE ORAL at 05:32

## 2018-07-09 RX ADMIN — LORATADINE 10 MILLIGRAM(S): 10 TABLET ORAL at 13:53

## 2018-07-09 RX ADMIN — Medication 650 MILLIGRAM(S): at 21:35

## 2018-07-09 RX ADMIN — Medication 650 MILLIGRAM(S): at 14:53

## 2018-07-09 RX ADMIN — HYDROMORPHONE HYDROCHLORIDE 0.5 MILLIGRAM(S): 2 INJECTION INTRAMUSCULAR; INTRAVENOUS; SUBCUTANEOUS at 10:07

## 2018-07-09 RX ADMIN — HYDROMORPHONE HYDROCHLORIDE 0.5 MILLIGRAM(S): 2 INJECTION INTRAMUSCULAR; INTRAVENOUS; SUBCUTANEOUS at 19:56

## 2018-07-09 RX ADMIN — Medication 12.5 MILLIGRAM(S): at 17:44

## 2018-07-09 RX ADMIN — Medication 1 TABLET(S): at 13:53

## 2018-07-09 RX ADMIN — Medication 650 MILLIGRAM(S): at 13:52

## 2018-07-09 RX ADMIN — HYDROMORPHONE HYDROCHLORIDE 0.5 MILLIGRAM(S): 2 INJECTION INTRAMUSCULAR; INTRAVENOUS; SUBCUTANEOUS at 05:30

## 2018-07-09 RX ADMIN — Medication 3 MILLIGRAM(S): at 21:32

## 2018-07-09 RX ADMIN — Medication 12.5 MILLIGRAM(S): at 05:31

## 2018-07-09 RX ADMIN — Medication 1 APPLICATION(S): at 05:31

## 2018-07-09 RX ADMIN — Medication 1 APPLICATION(S): at 17:36

## 2018-07-09 RX ADMIN — Medication 500 MILLIGRAM(S): at 13:52

## 2018-07-09 RX ADMIN — Medication 1 TABLET(S): at 05:31

## 2018-07-09 RX ADMIN — GABAPENTIN 200 MILLIGRAM(S): 400 CAPSULE ORAL at 21:32

## 2018-07-09 RX ADMIN — MINOCYCLINE HYDROCHLORIDE 100 MILLIGRAM(S): 45 TABLET, EXTENDED RELEASE ORAL at 05:32

## 2018-07-09 RX ADMIN — HYDROMORPHONE HYDROCHLORIDE 2 MILLIGRAM(S): 2 INJECTION INTRAMUSCULAR; INTRAVENOUS; SUBCUTANEOUS at 12:00

## 2018-07-09 RX ADMIN — Medication 1 APPLICATION(S): at 17:35

## 2018-07-09 RX ADMIN — TIOTROPIUM BROMIDE 1 CAPSULE(S): 18 CAPSULE ORAL; RESPIRATORY (INHALATION) at 13:53

## 2018-07-09 RX ADMIN — Medication 650 MILLIGRAM(S): at 07:15

## 2018-07-09 RX ADMIN — HYDROMORPHONE HYDROCHLORIDE 0.5 MILLIGRAM(S): 2 INJECTION INTRAMUSCULAR; INTRAVENOUS; SUBCUTANEOUS at 15:42

## 2018-07-09 RX ADMIN — Medication 650 MILLIGRAM(S): at 06:45

## 2018-07-09 RX ADMIN — Medication 1 APPLICATION(S): at 06:08

## 2018-07-09 RX ADMIN — WARFARIN SODIUM 1 MILLIGRAM(S): 2.5 TABLET ORAL at 21:32

## 2018-07-09 RX ADMIN — GABAPENTIN 200 MILLIGRAM(S): 400 CAPSULE ORAL at 13:54

## 2018-07-09 RX ADMIN — HYDROMORPHONE HYDROCHLORIDE 2 MILLIGRAM(S): 2 INJECTION INTRAMUSCULAR; INTRAVENOUS; SUBCUTANEOUS at 11:09

## 2018-07-09 RX ADMIN — Medication 1 TABLET(S): at 21:32

## 2018-07-09 RX ADMIN — SIMVASTATIN 20 MILLIGRAM(S): 20 TABLET, FILM COATED ORAL at 21:32

## 2018-07-09 RX ADMIN — BUDESONIDE AND FORMOTEROL FUMARATE DIHYDRATE 2 PUFF(S): 160; 4.5 AEROSOL RESPIRATORY (INHALATION) at 05:31

## 2018-07-09 RX ADMIN — Medication 1 MILLIGRAM(S): at 13:53

## 2018-07-09 RX ADMIN — HYDROMORPHONE HYDROCHLORIDE 0.5 MILLIGRAM(S): 2 INJECTION INTRAMUSCULAR; INTRAVENOUS; SUBCUTANEOUS at 20:26

## 2018-07-09 RX ADMIN — Medication 81 MILLIGRAM(S): at 13:51

## 2018-07-09 NOTE — PROGRESS NOTE ADULT - SUBJECTIVE AND OBJECTIVE BOX
Patient is a 77y old  Female who presents with a chief complaint of fever, lethargy (07 Jun 2018 22:19)      INTERVAL HPI/OVERNIGHT EVENTS: noted, feels the same      Vital Signs Last 24 Hrs  T(C): 37.1 (09 Jul 2018 12:00), Max: 37.1 (09 Jul 2018 12:00)  T(F): 98.7 (09 Jul 2018 12:00), Max: 98.7 (09 Jul 2018 12:00)  HR: 98 (09 Jul 2018 12:00) (65 - 98)  BP: 108/72 (09 Jul 2018 12:00) (96/62 - 119/76)  BP(mean): --  RR: 18 (09 Jul 2018 12:00) (18 - 18)  SpO2: 97% (09 Jul 2018 12:00) (93% - 97%)    acetaminophen   Tablet. 650 milliGRAM(s) Oral every 6 hours PRN  ALPRAZolam 0.25 milliGRAM(s) Oral once  ascorbic acid 500 milliGRAM(s) Oral daily  aspirin enteric coated 81 milliGRAM(s) Oral daily  betamethasone valerate 0.1% Ointment 1 Application(s) Topical two times a day  bisacodyl Suppository 10 milliGRAM(s) Rectal daily PRN  bismuth subsalicylate Liquid 30 milliLiter(s) Oral every 6 hours PRN  buDESOnide 160 MICROgram(s)/formoterol 4.5 MICROgram(s) Inhaler 2 Puff(s) Inhalation two times a day  calcium carbonate 1250 mG + Vitamin D (OsCal 500 + D) 1 Tablet(s) Oral three times a day  Dakins Solution - 1/4 Strength 1 Application(s) Topical two times a day  folic acid 1 milliGRAM(s) Oral daily  gabapentin 200 milliGRAM(s) Oral three times a day  HYDROmorphone   Tablet 2 milliGRAM(s) Oral every 3 hours PRN  HYDROmorphone  Injectable 0.5 milliGRAM(s) IV Push every 4 hours PRN  loratadine 10 milliGRAM(s) Oral daily  magnesium hydroxide Suspension 30 milliLiter(s) Oral daily PRN  melatonin 3 milliGRAM(s) Oral at bedtime  metoprolol tartrate 12.5 milliGRAM(s) Oral two times a day  minocycline 100 milliGRAM(s) Oral two times a day  multivitamin 1 Tablet(s) Oral daily  pantoprazole    Tablet 40 milliGRAM(s) Oral before breakfast  polyethylene glycol 3350 17 Gram(s) Oral two times a day  senna 2 Tablet(s) Oral at bedtime PRN  simethicone 80 milliGRAM(s) Chew two times a day PRN  simvastatin 20 milliGRAM(s) Oral at bedtime  tiotropium 18 MICROgram(s) Capsule 1 Capsule(s) Inhalation daily      PHYSICAL EXAM:  GENERAL: NAD,   EYES: conjunctiva and sclera clear  ENMT: Moist mucous membranes  NECK: Supple, No JVD, Normal thyroid  NERVOUS SYSTEM:  Alert & Oriented X3,   CHEST/LUNG: Clear to auscultation bilaterally; No rales, rhonchi, wheezing, or rubs  HEART: Regular rate and rhythm; No murmurs, rubs, or gallops  ABDOMEN: Soft, Nontender, Nondistended; Bowel sounds present  EXTREMITIES:  right medial thigh wound dressing, wound vac to right knee , lower abd , rt thigh wound vac dced  left thigh small wound packed  LYMPH: No lymphadenopathy noted  SKIN: No rashes or lesions    Consultant(s) Notes Reviewed:  [x ] YES  [ ] NO  Care Discussed with Consultants/Other Providers [ x] YES  [ ] NO    LABS:                        8.3    8.12  )-----------( 267      ( 09 Jul 2018 09:27 )             28.2     07-09    141  |  99  |  29<H>  ----------------------------<  103<H>  4.4   |  29  |  0.83    Ca    9.3      09 Jul 2018 07:17      PT/INR - ( 09 Jul 2018 09:21 )   PT: 19.4 sec;   INR: 1.70 ratio             CAPILLARY BLOOD GLUCOSE                RADIOLOGY & ADDITIONAL TESTS:    Imaging Personally Reviewed:  [ ] YES  [ ] NO

## 2018-07-09 NOTE — PROGRESS NOTE ADULT - ASSESSMENT
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 completed 10 days meropenem.    # abd wound, multiple bl leg wounds  continue vac to abdomen, rt posterior thigh and rt Knee, vac changes M/W/F  appreciate wound care recs, cont NS irrigation, cavilon, medihoney  pain control  6/22 left thigh and left arm punch biopsies resulted and consistent with urticaria. appreciate allergy recs. pt currently asymptomatic so will hold off antihistamine  tissue culture grew coag neg staph which can be normal roopa / colonized  appreciate derm recs    # polymyalgia rheumatica  steroids on hold to allow for wound healing  appreciate rheum recs    #TKR wound-mrsa  chr minocycline  ID following    # Pain control  bowel regimen  constipation, increase miralax and add milk of mag and tap water enema if no BM    # chr Anemia  stable, monitor h/h closely    # S/P TAVR (transcatheter aortic valve replacement)  continue Coumadin, daily INR    # adjustment disorder with depressed mood  xanax prn    dispo:   discharge has been difficult as family has unrealistic expectation to stay in the hospital until wounds heal.    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 completed 10 days meropenem.    # abd wound, multiple bl leg wounds  continue vac to abdomen, rt posterior thigh and rt Knee, vac changes M/W/F  appreciate wound care recs, cont NS irrigation, cavilon, medihoney  pain control  6/22 left thigh and left arm punch biopsies resulted and consistent with urticaria. appreciate allergy recs. pt currently asymptomatic so will hold off antihistamine  tissue culture grew coag neg staph which can be normal roopa / colonized  appreciate derm recs    # polymyalgia rheumatica  steroids on hold to allow for wound healing  appreciate rheum recs    #TKR wound-mrsa  chr minocycline  ID following    # Pain control  bowel regimen  constipation, increase miralax and add milk of mag and tap water enema if no BM    # chr Anemia  stable, monitor h/h closely    # S/P TAVR (transcatheter aortic valve replacement)  continue Coumadin, daily INR    # adjustment disorder with depressed mood  xanax prn    dispo:   discharge has been difficult as family has unrealistic expectation to stay in the hospital until wounds heal.    pt cleared for discharge by all subspecialities-ortho, plastics, derm, rheum, allergy, ID, surgery and wound care  d/w pt and dgtr at bedside - who doesn't even want to hear about discharge-  they are concerned about not getting the kind of wound care at the rehab that she is receiving in the hospital and reluctant to hear anything in favor of rehab

## 2018-07-09 NOTE — PROGRESS NOTE ADULT - ASSESSMENT
no plan for orthopedic surgical intervention per family and patient preference  vac and wound care per PRS and wound care teams  PO abx per ID team, minocycline  encourage patient to spend majority of bed oob in bedside chair as able, has not been oob recently, PLEASE HAVE PT COME BY TO MOBILIZE PATIENT, she must remain 50% wb on the RLE and NO KNEE MOTION allowed, knee immobilizer if oob  coumadin, inr goal 2-3  continue to optimize nutrition  continue to involve psych, more emotional today  orthopedic issues are stable for discharge to rehab, please make sure patient and family is okay with discharge before initiating planning process    I had a 30 minute conversation with Mr. Rdz today over the telephone. He was angry again with the various teams involved insisting that she has an infection of these thigh wounds and stating that they are getting worse and not better. I explained to him that her WBC has been normal, she has had no fevers, and no overt evidence of infection at this time. While the thigh wound has required change back to wet-to-dry overall per report the PRS/WC teams are pleased with their progress. I discussed this with Dr. Mg who is following the patient as well on 7/6/18. Mr. Rdz is angry that he feels that we are intentionally hurting his wife and not helping her to get better. He threatened to bring about a lawsuit against everyone involved since she is not improving. He would like to bring in his own infectious disease consultant (did not provide a name) but he stated that the hospital wound not allow it since this other physician is not privileged at the hospital. I informed him that he can request new consultants to be involved for new opinions if he would like. From the orthopedic perspective I am limited in what I can do as she has mainly been impaired for wound healing. I again discussed with him that we always planned for months of wound healing over 3 months ago given the extent of the wounds which was one of the reasons amputation was discussed months ago and they preferred to wait for wound healing and per PRS she has been making progress towards this end. I provided emotional support and again fully discussed the case with him. He stated that he will be present at the hospital tomorrow to visit his wife.

## 2018-07-09 NOTE — PROGRESS NOTE ADULT - ASSESSMENT
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  Wounds of knee & BLE/ abdomin too deep and involved w/ extensive necrosis.  Pt too tender when dressings are changed.  she couldn't scratch herself enough to create these wounds.  Pt also has HHA at bedside who denied self inflicted wounds.  Surgical wound dehisced & necrosed      Multiple wounds Abdomen Bilateral thighs -no gross signs of infection    VAC to RLQ abdominal wound and Rt  lateral thigh wounds & Rt knee  Multiple smaller wounds- Medihoney or Aquacel dressings- dressing order placed    Rt knee w/ skin dehiscence - per plastics and ortho  Abx per Medicine/ ID  Gen SUrg/ Plastics/ Ortho consults appreciated  con't offloading  con't Nuturition  con't Pericare  Con't per Medicine  F/u as outpatient in W[facility] [address] [facility] [phone]  d/w Medicine & Plastics team & d/w attending  Janki Cramer PA-C [id number]  I spent  35 minutes w/ this pt of which more than 50% of the time was spent counseling & coordinating care of this pt.

## 2018-07-09 NOTE — PROGRESS NOTE ADULT - ASSESSMENT
A/P: s/p Right total knee insertion of spacer, irrigation and debridement, complex wound closure 3/23; readmitted 6/8 for appendicitis    - D/w primary team regarding pain consult  - cont vac/dressing changes to R knee, thigh, and abdomen M/W/F  - remainder of wounds per wound care/derm  - ok for discharge from PRS perspective    Plastic Surgery (pg TOYA: [id number], NS: [phone])

## 2018-07-09 NOTE — PROGRESS NOTE ADULT - SUBJECTIVE AND OBJECTIVE BOX
pain controlled  still not oob  emotional today    abdominal wound - vac  RLE  thigh - dressing per wound care teams, lateral wound wet to dry again per prs/wc teams  knee wound with vac   remainder of wounds dressed by wound care  5/5 ta/ehl/gcs  silt l4-s1  2+ dp pulse    ROS: depressed

## 2018-07-09 NOTE — PROGRESS NOTE ADULT - SUBJECTIVE AND OBJECTIVE BOX
SUBJECTIVE: Pt seen, chart reviewed.  Pt's daughter & HHA at bedside.  Pt was premedicated.  Worsening pain-- c/o burning pain RLE.  Pt seen w/ Medical attng & Plastics resident.  Pt overall improving otherwise- abx for appendicitis  no definitive dx for ulcers.  needs f/u w/ rheum/ derm/ maybe hem/onc for spontaneous necrotic hemorraghic wounds w/ fat necrosis    No odor, redness, warmth, f/c/s noted  drainage managed by dressings	    ROS skin / msk see HPI   all other systems negative      aspirin enteric coated 81 milliGRAM(s) Oral daily  minocycline 100 milliGRAM(s) Oral two times a day      Physical Exam:  Vital Signs Last 24 Hrs  T(C): 37.1 (09 Jul 2018 12:00), Max: 37.1 (09 Jul 2018 12:00)  T(F): 98.7 (09 Jul 2018 12:00), Max: 98.7 (09 Jul 2018 12:00)  HR: 98 (09 Jul 2018 12:00) (65 - 98)  BP: 108/72 (09 Jul 2018 12:00) (96/62 - 119/76)  BP(mean): --  RR: 18 (09 Jul 2018 12:00) (18 - 18)  SpO2: 97% (09 Jul 2018 12:00) (93% - 97%)    General Appearance:     NAD /  A&Ox3  MO/ frail/ Disheveled   Envella    Musculoskeletal/Vascular:  BLE edema  BLE equally warm no acute ischemia noted  no deformities/ contractures  Rt knee wound per plastics    Skin:  dry, frail,  ecchymosis w/o hematoma    pt is tender when just gently touching skin- therefore pt was premedicated prior to assessment    Multiple wounds- see measurements in A&I sections- placed by wound PT    RLQ abdomen wound w/ moist & granular tissue no odor, kimber     Rt anterior thigh wound w/ moist & granular tissue    Rt superior posterolateral w/ new upper thigh small wound opened w/serosanginous drainage    Rt inferior posterolateral wound moist & granular tissue w/ nonviable tissue on inferior wall of dermis and medial periwound skin w/ a new firmness    (+) serosanguinous drainage & tender w/o odor  No erythema, increased warmth, induration, fluctuance    Procedure Note Rt thigh w/ nonviable tissue  w/ eschar & fatty necrosis on wound edge  Using aseptic technique abdominal wall wound was excisionally debrided using a scissor and forceps through nonviable dermis wound is down to subcutaneous & adipose tissue.  Pt tolerated procedure well.  Hemostasis was maintained throughout.      Lt lateral thigh wound  3cm x 1cm x 3.5cm w/ necrotic dermis  full thickness ulcers w/ moist & granular tissue   (+)serosanguinous  drainage  (+)tender w/o malodor  No erythema, increased warmth, induration, fluctuance    Lt lateral posterior thigh (inferior) superficial w/ 2 small partial thickness  fibrinous exudate  in 2cm x 1cm x 0.1cm  (+)serosanguinous drainage & tender  No erythema,  odor, increased warmth, induration, fluctuance    Lt  Upper medial inner thigh   3 wounds/ 2 partial thickness & 1 full thickness   moist &granular w/ fibrinous exudate  (+)serosanguinous drainage  (+)tender w/o odor  No erythema, increased warmth, induration, fluctuance        LABS:                        8.3    8.12  )-----------( 267      ( 09 Jul 2018 09:27 )             28.2     PT/INR - ( 09 Jul 2018 09:21 )   PT: 19.4 sec;   INR: 1.70 ratio

## 2018-07-09 NOTE — PROGRESS NOTE ADULT - SUBJECTIVE AND OBJECTIVE BOX
Plastic Surgery Progress Note (pg [facility]: [id number], NS: [phone])    SUBJECTIVE:  The patient was seen and examined. No acute events overnight.    OBJECTIVE:     ** VITAL SIGNS / I&O's **    Vital Signs Last 24 Hrs  T(C): 36.7 (09 Jul 2018 07:41), Max: 36.8 (08 Jul 2018 16:39)  T(F): 98 (09 Jul 2018 07:41), Max: 98.3 (08 Jul 2018 16:39)  HR: 93 (09 Jul 2018 07:41) (65 - 98)  BP: 96/62 (09 Jul 2018 07:41) (96/62 - 119/76)  BP(mean): --  RR: 18 (09 Jul 2018 07:41) (18 - 18)  SpO2: 97% (09 Jul 2018 07:41) (93% - 97%)      08 Jul 2018 07:01  -  09 Jul 2018 07:00  --------------------------------------------------------  IN:  Total IN: 0 mL    OUT:    Voided: 800 mL  Total OUT: 800 mL    Total NET: -800 mL      09 Jul 2018 07:01  -  09 Jul 2018 11:26  --------------------------------------------------------  IN:    Oral Fluid: 300 mL  Total IN: 300 mL    OUT:  Total OUT: 0 mL    Total NET: 300 mL          ** PHYSICAL EXAM **    -- CONSTITUTIONAL: Alert, NAD   -- ABDOMEN: central abd VAC intact and set to suction.   -- EXTREMITIES: R lateral thigh wound appears to have minor necrosed edges that were removed by wound care team, wet to dry dressing applied. R medial thigh- wet to dry dressing applied. R knee vac is off irrigation, holding suction.     ** LABS **                          8.3    8.12  )-----------( 267      ( 09 Jul 2018 09:27 )             28.2     09 Jul 2018 07:17    141    |  99     |  29     ----------------------------<  103    4.4     |  29     |  0.83     Ca    9.3        09 Jul 2018 07:17      PT/INR - ( 09 Jul 2018 09:21 )   PT: 19.4 sec;   INR: 1.70 ratio           CAPILLARY BLOOD GLUCOSE                    ** MEDICATIONS **  MEDICATIONS  (STANDING):  ALPRAZolam 0.25 milliGRAM(s) Oral once  ascorbic acid 500 milliGRAM(s) Oral daily  aspirin enteric coated 81 milliGRAM(s) Oral daily  betamethasone valerate 0.1% Ointment 1 Application(s) Topical two times a day  buDESOnide 160 MICROgram(s)/formoterol 4.5 MICROgram(s) Inhaler 2 Puff(s) Inhalation two times a day  calcium carbonate 1250 mG + Vitamin D (OsCal 500 + D) 1 Tablet(s) Oral three times a day  Dakins Solution - 1/4 Strength 1 Application(s) Topical two times a day  folic acid 1 milliGRAM(s) Oral daily  gabapentin 200 milliGRAM(s) Oral three times a day  loratadine 10 milliGRAM(s) Oral daily  melatonin 3 milliGRAM(s) Oral at bedtime  metoprolol tartrate 12.5 milliGRAM(s) Oral two times a day  minocycline 100 milliGRAM(s) Oral two times a day  multivitamin 1 Tablet(s) Oral daily  pantoprazole    Tablet 40 milliGRAM(s) Oral before breakfast  polyethylene glycol 3350 17 Gram(s) Oral two times a day  simvastatin 20 milliGRAM(s) Oral at bedtime  tiotropium 18 MICROgram(s) Capsule 1 Capsule(s) Inhalation daily    MEDICATIONS  (PRN):  acetaminophen   Tablet. 650 milliGRAM(s) Oral every 6 hours PRN Mild Pain (1 - 3)  bisacodyl Suppository 10 milliGRAM(s) Rectal daily PRN Constipation  bismuth subsalicylate Liquid 30 milliLiter(s) Oral every 6 hours PRN Cramping/abd pain  HYDROmorphone   Tablet 2 milliGRAM(s) Oral every 3 hours PRN Moderate Pain (4 - 6)  HYDROmorphone  Injectable 0.5 milliGRAM(s) IV Push every 4 hours PRN Severe Pain (7 - 10)  magnesium hydroxide Suspension 30 milliLiter(s) Oral daily PRN Constipation  senna 2 Tablet(s) Oral at bedtime PRN Constipation  simethicone 80 milliGRAM(s) Chew two times a day PRN Gas

## 2018-07-10 LAB
ANION GAP SERPL CALC-SCNC: 13 MMOL/L — SIGNIFICANT CHANGE UP (ref 5–17)
APTT BLD: 36.2 SEC — SIGNIFICANT CHANGE UP (ref 27.5–37.4)
BUN SERPL-MCNC: 32 MG/DL — HIGH (ref 7–23)
CALCIUM SERPL-MCNC: 9.7 MG/DL — SIGNIFICANT CHANGE UP (ref 8.4–10.5)
CHLORIDE SERPL-SCNC: 99 MMOL/L — SIGNIFICANT CHANGE UP (ref 96–108)
CO2 SERPL-SCNC: 29 MMOL/L — SIGNIFICANT CHANGE UP (ref 22–31)
CREAT SERPL-MCNC: 0.84 MG/DL — SIGNIFICANT CHANGE UP (ref 0.5–1.3)
GLUCOSE SERPL-MCNC: 109 MG/DL — HIGH (ref 70–99)
HCT VFR BLD CALC: 28.1 % — LOW (ref 34.5–45)
HGB BLD-MCNC: 8.7 G/DL — LOW (ref 11.5–15.5)
INR BLD: 1.45 RATIO — HIGH (ref 0.88–1.16)
MCHC RBC-ENTMCNC: 27.3 PG — SIGNIFICANT CHANGE UP (ref 27–34)
MCHC RBC-ENTMCNC: 31 GM/DL — LOW (ref 32–36)
MCV RBC AUTO: 88.1 FL — SIGNIFICANT CHANGE UP (ref 80–100)
PLATELET # BLD AUTO: 261 K/UL — SIGNIFICANT CHANGE UP (ref 150–400)
POTASSIUM SERPL-MCNC: 4.4 MMOL/L — SIGNIFICANT CHANGE UP (ref 3.5–5.3)
POTASSIUM SERPL-SCNC: 4.4 MMOL/L — SIGNIFICANT CHANGE UP (ref 3.5–5.3)
PROTHROM AB SERPL-ACNC: 16.5 SEC — HIGH (ref 10–13.1)
RBC # BLD: 3.19 M/UL — LOW (ref 3.8–5.2)
RBC # FLD: 17.3 % — HIGH (ref 10.3–14.5)
SODIUM SERPL-SCNC: 141 MMOL/L — SIGNIFICANT CHANGE UP (ref 135–145)
WBC # BLD: 9.26 K/UL — SIGNIFICANT CHANGE UP (ref 3.8–10.5)
WBC # FLD AUTO: 9.26 K/UL — SIGNIFICANT CHANGE UP (ref 3.8–10.5)

## 2018-07-10 RX ORDER — GABAPENTIN 400 MG/1
300 CAPSULE ORAL THREE TIMES A DAY
Qty: 0 | Refills: 0 | Status: DISCONTINUED | OUTPATIENT
Start: 2018-07-10 | End: 2018-08-16

## 2018-07-10 RX ORDER — HYDROMORPHONE HYDROCHLORIDE 2 MG/ML
0.5 INJECTION INTRAMUSCULAR; INTRAVENOUS; SUBCUTANEOUS ONCE
Qty: 0 | Refills: 0 | Status: DISCONTINUED | OUTPATIENT
Start: 2018-07-10 | End: 2018-07-10

## 2018-07-10 RX ORDER — HYDROMORPHONE HYDROCHLORIDE 2 MG/ML
4 INJECTION INTRAMUSCULAR; INTRAVENOUS; SUBCUTANEOUS EVERY 4 HOURS
Qty: 0 | Refills: 0 | Status: DISCONTINUED | OUTPATIENT
Start: 2018-07-10 | End: 2018-07-13

## 2018-07-10 RX ORDER — WARFARIN SODIUM 2.5 MG/1
1.5 TABLET ORAL ONCE
Qty: 0 | Refills: 0 | Status: COMPLETED | OUTPATIENT
Start: 2018-07-10 | End: 2018-07-10

## 2018-07-10 RX ADMIN — Medication 1 TABLET(S): at 21:12

## 2018-07-10 RX ADMIN — HYDROMORPHONE HYDROCHLORIDE 2 MILLIGRAM(S): 2 INJECTION INTRAMUSCULAR; INTRAVENOUS; SUBCUTANEOUS at 06:37

## 2018-07-10 RX ADMIN — GABAPENTIN 200 MILLIGRAM(S): 400 CAPSULE ORAL at 06:13

## 2018-07-10 RX ADMIN — HYDROMORPHONE HYDROCHLORIDE 0.5 MILLIGRAM(S): 2 INJECTION INTRAMUSCULAR; INTRAVENOUS; SUBCUTANEOUS at 08:27

## 2018-07-10 RX ADMIN — HYDROMORPHONE HYDROCHLORIDE 4 MILLIGRAM(S): 2 INJECTION INTRAMUSCULAR; INTRAVENOUS; SUBCUTANEOUS at 22:40

## 2018-07-10 RX ADMIN — Medication 3 MILLIGRAM(S): at 21:13

## 2018-07-10 RX ADMIN — HYDROMORPHONE HYDROCHLORIDE 2 MILLIGRAM(S): 2 INJECTION INTRAMUSCULAR; INTRAVENOUS; SUBCUTANEOUS at 13:08

## 2018-07-10 RX ADMIN — Medication 1 APPLICATION(S): at 18:09

## 2018-07-10 RX ADMIN — HYDROMORPHONE HYDROCHLORIDE 2 MILLIGRAM(S): 2 INJECTION INTRAMUSCULAR; INTRAVENOUS; SUBCUTANEOUS at 20:15

## 2018-07-10 RX ADMIN — PANTOPRAZOLE SODIUM 40 MILLIGRAM(S): 20 TABLET, DELAYED RELEASE ORAL at 06:13

## 2018-07-10 RX ADMIN — LORATADINE 10 MILLIGRAM(S): 10 TABLET ORAL at 13:13

## 2018-07-10 RX ADMIN — WARFARIN SODIUM 1.5 MILLIGRAM(S): 2.5 TABLET ORAL at 21:12

## 2018-07-10 RX ADMIN — HYDROMORPHONE HYDROCHLORIDE 2 MILLIGRAM(S): 2 INJECTION INTRAMUSCULAR; INTRAVENOUS; SUBCUTANEOUS at 07:34

## 2018-07-10 RX ADMIN — HYDROMORPHONE HYDROCHLORIDE 0.5 MILLIGRAM(S): 2 INJECTION INTRAMUSCULAR; INTRAVENOUS; SUBCUTANEOUS at 05:00

## 2018-07-10 RX ADMIN — GABAPENTIN 300 MILLIGRAM(S): 400 CAPSULE ORAL at 21:14

## 2018-07-10 RX ADMIN — MINOCYCLINE HYDROCHLORIDE 100 MILLIGRAM(S): 45 TABLET, EXTENDED RELEASE ORAL at 18:08

## 2018-07-10 RX ADMIN — Medication 12.5 MILLIGRAM(S): at 18:08

## 2018-07-10 RX ADMIN — BUDESONIDE AND FORMOTEROL FUMARATE DIHYDRATE 2 PUFF(S): 160; 4.5 AEROSOL RESPIRATORY (INHALATION) at 06:15

## 2018-07-10 RX ADMIN — Medication 1 APPLICATION(S): at 07:11

## 2018-07-10 RX ADMIN — HYDROMORPHONE HYDROCHLORIDE 0.5 MILLIGRAM(S): 2 INJECTION INTRAMUSCULAR; INTRAVENOUS; SUBCUTANEOUS at 08:12

## 2018-07-10 RX ADMIN — Medication 1 APPLICATION(S): at 06:14

## 2018-07-10 RX ADMIN — Medication 1 APPLICATION(S): at 18:08

## 2018-07-10 RX ADMIN — Medication 650 MILLIGRAM(S): at 09:33

## 2018-07-10 RX ADMIN — HYDROMORPHONE HYDROCHLORIDE 2 MILLIGRAM(S): 2 INJECTION INTRAMUSCULAR; INTRAVENOUS; SUBCUTANEOUS at 19:45

## 2018-07-10 RX ADMIN — SIMVASTATIN 20 MILLIGRAM(S): 20 TABLET, FILM COATED ORAL at 21:14

## 2018-07-10 RX ADMIN — TIOTROPIUM BROMIDE 1 CAPSULE(S): 18 CAPSULE ORAL; RESPIRATORY (INHALATION) at 13:11

## 2018-07-10 RX ADMIN — GABAPENTIN 200 MILLIGRAM(S): 400 CAPSULE ORAL at 13:12

## 2018-07-10 RX ADMIN — Medication 500 MILLIGRAM(S): at 13:12

## 2018-07-10 RX ADMIN — HYDROMORPHONE HYDROCHLORIDE 4 MILLIGRAM(S): 2 INJECTION INTRAMUSCULAR; INTRAVENOUS; SUBCUTANEOUS at 22:09

## 2018-07-10 RX ADMIN — BUDESONIDE AND FORMOTEROL FUMARATE DIHYDRATE 2 PUFF(S): 160; 4.5 AEROSOL RESPIRATORY (INHALATION) at 18:08

## 2018-07-10 RX ADMIN — HYDROMORPHONE HYDROCHLORIDE 4 MILLIGRAM(S): 2 INJECTION INTRAMUSCULAR; INTRAVENOUS; SUBCUTANEOUS at 18:37

## 2018-07-10 RX ADMIN — HYDROMORPHONE HYDROCHLORIDE 2 MILLIGRAM(S): 2 INJECTION INTRAMUSCULAR; INTRAVENOUS; SUBCUTANEOUS at 14:08

## 2018-07-10 RX ADMIN — HYDROMORPHONE HYDROCHLORIDE 4 MILLIGRAM(S): 2 INJECTION INTRAMUSCULAR; INTRAVENOUS; SUBCUTANEOUS at 18:07

## 2018-07-10 RX ADMIN — Medication 1 MILLIGRAM(S): at 13:13

## 2018-07-10 RX ADMIN — Medication 81 MILLIGRAM(S): at 13:26

## 2018-07-10 RX ADMIN — Medication 12.5 MILLIGRAM(S): at 06:13

## 2018-07-10 RX ADMIN — HYDROMORPHONE HYDROCHLORIDE 0.5 MILLIGRAM(S): 2 INJECTION INTRAMUSCULAR; INTRAVENOUS; SUBCUTANEOUS at 14:50

## 2018-07-10 RX ADMIN — Medication 1 TABLET(S): at 13:13

## 2018-07-10 RX ADMIN — Medication 1 TABLET(S): at 06:13

## 2018-07-10 RX ADMIN — Medication 650 MILLIGRAM(S): at 07:33

## 2018-07-10 RX ADMIN — HYDROMORPHONE HYDROCHLORIDE 0.5 MILLIGRAM(S): 2 INJECTION INTRAMUSCULAR; INTRAVENOUS; SUBCUTANEOUS at 04:53

## 2018-07-10 RX ADMIN — Medication 1 TABLET(S): at 13:10

## 2018-07-10 RX ADMIN — MINOCYCLINE HYDROCHLORIDE 100 MILLIGRAM(S): 45 TABLET, EXTENDED RELEASE ORAL at 06:13

## 2018-07-10 NOTE — PROGRESS NOTE ADULT - SUBJECTIVE AND OBJECTIVE BOX
Patient is a 77y old  Female who presents with a chief complaint of fever, lethargy (07 Jun 2018 22:19)      INTERVAL HPI/OVERNIGHT EVENTS: this am c/o rt leg  pain- requiring iv dilaudid      Vital Signs Last 24 Hrs  T(C): 36.7 (10 Jul 2018 07:49), Max: 36.9 (09 Jul 2018 20:59)  T(F): 98 (10 Jul 2018 07:49), Max: 98.4 (09 Jul 2018 20:59)  HR: 96 (10 Jul 2018 07:49) (76 - 96)  BP: 122/88 (10 Jul 2018 07:49) (110/70 - 139/79)  BP(mean): --  RR: 18 (10 Jul 2018 07:49) (18 - 18)  SpO2: 98% (10 Jul 2018 07:49) (93% - 98%)    acetaminophen   Tablet. 650 milliGRAM(s) Oral every 6 hours PRN  ALPRAZolam 0.25 milliGRAM(s) Oral once  ascorbic acid 500 milliGRAM(s) Oral daily  aspirin enteric coated 81 milliGRAM(s) Oral daily  betamethasone valerate 0.1% Ointment 1 Application(s) Topical two times a day  bisacodyl Suppository 10 milliGRAM(s) Rectal daily PRN  bismuth subsalicylate Liquid 30 milliLiter(s) Oral every 6 hours PRN  buDESOnide 160 MICROgram(s)/formoterol 4.5 MICROgram(s) Inhaler 2 Puff(s) Inhalation two times a day  calcium carbonate 1250 mG + Vitamin D (OsCal 500 + D) 1 Tablet(s) Oral three times a day  Dakins Solution - 1/4 Strength 1 Application(s) Topical two times a day  folic acid 1 milliGRAM(s) Oral daily  gabapentin 200 milliGRAM(s) Oral three times a day  HYDROmorphone   Tablet 2 milliGRAM(s) Oral every 3 hours PRN  HYDROmorphone  Injectable 0.5 milliGRAM(s) IV Push every 4 hours PRN  loratadine 10 milliGRAM(s) Oral daily  magnesium hydroxide Suspension 30 milliLiter(s) Oral daily PRN  melatonin 3 milliGRAM(s) Oral at bedtime  metoprolol tartrate 12.5 milliGRAM(s) Oral two times a day  minocycline 100 milliGRAM(s) Oral two times a day  multivitamin 1 Tablet(s) Oral daily  pantoprazole    Tablet 40 milliGRAM(s) Oral before breakfast  polyethylene glycol 3350 17 Gram(s) Oral two times a day  senna 2 Tablet(s) Oral at bedtime PRN  simethicone 80 milliGRAM(s) Chew two times a day PRN  simvastatin 20 milliGRAM(s) Oral at bedtime  tiotropium 18 MICROgram(s) Capsule 1 Capsule(s) Inhalation daily      PHYSICAL EXAM:  GENERAL: NAD,   EYES: conjunctiva and sclera clear  ENMT: Moist mucous membranes  NECK: Supple, No JVD, Normal thyroid  NERVOUS SYSTEM:  Alert & Oriented X3,   CHEST/LUNG: Clear to auscultation bilaterally; No rales, rhonchi, wheezing, or rubs  HEART: Regular rate and rhythm; No murmurs, rubs, or gallops  ABDOMEN: Soft, Nontender, Nondistended; Bowel sounds present  EXTREMITIES:  right medial thigh wound dressing, wound vac to right knee , lower abd wound vac , rt thigh wound vac dced, rt lateral thigh wound dressing  left thigh  wound packed, multiple small wounds dressing  LYMPH: No lymphadenopathy noted  SKIN: No rashes or lesions    Consultant(s) Notes Reviewed:  [x ] YES  [ ] NO  Care Discussed with Consultants/Other Providers [ x] YES  [ ] NO    LABS:                        8.7    9.26  )-----------( 261      ( 10 Jul 2018 08:10 )             28.1     07-10    141  |  99  |  32<H>  ----------------------------<  109<H>  4.4   |  29  |  0.84    Ca    9.7      10 Jul 2018 07:12      PT/INR - ( 10 Jul 2018 08:17 )   PT: 16.5 sec;   INR: 1.45 ratio         PTT - ( 10 Jul 2018 08:17 )  PTT:36.2 sec    CAPILLARY BLOOD GLUCOSE                RADIOLOGY & ADDITIONAL TESTS:    Imaging Personally Reviewed:  [ x] YES  [ ] NO

## 2018-07-10 NOTE — CHART NOTE - NSCHARTNOTEFT_GEN_A_CORE
Discussion with the primary team after the chart review revealed that this patient may benefit from a Pain consult in lieu of a palliative care consult.  She was previously seen by the Pain service.  Discussed with Floor NP

## 2018-07-10 NOTE — PROGRESS NOTE ADULT - ASSESSMENT
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 completed 10 days meropenem.    # abd wound, multiple bl thigh wounds- pt without new wounds  continue vac to abdomen,  rt Knee, vac changes M/W/F  appreciate wound care recs, cont NS irrigation, cavilon, medihoney  pain control  6/22 left thigh and left arm punch biopsies resulted and consistent with urticaria. appreciate allergy recs. pt currently asymptomatic so will hold off antihistamine  tissue culture grew coag neg staph which can be normal roopa / colonized  appreciate derm recs  Per ID/Derm-no e/o infectious process contributing to the wounds    # Pain control: dilaudid 2q3 prn mod pain, 4mgq4-prn severe pain, dc iv dilaudid  gabapentin increased to 300tid  bowel regimen      # polymyalgia rheumatica  steroids on hold to allow for wound healing  f/u Rheum    #TKR wound-mrsa  chr minocycline  ID f/u        # chr Anemia  stable, monitor h/h closely    # S/P TAVR (transcatheter aortic valve replacement)  continue Coumadin, daily INR    # adjustment disorder with depressed mood  xanax prn    dispo:   discharge has been difficult as family has unrealistic expectation to stay in the hospital until wounds heal.    pt cleared for discharge by all subspecialities-ortho, plastics, derm, rheum, allergy, ID, surgery and wound care  d/w pt and dgtr at bedside 7/6/18 - who doesn't even want to hear about discharge-  they are concerned about not getting the kind of wound care at the rehab that she is receiving in the hospital and reluctant to hear anything in favor of rehab    Today had a long phone conversation with Mr Rdz in pt's room- explained to him that wounds are on a healing path per wound care team, plastics and pt is cleared for discharge from all subspecialities  stressed on the importance of physical therapy at the rehab for pt's functional recovery and tried to convince him re: wound care at rehab would be adequate to meet the pt needs otherwise pt would not be accepted into their facilities  Also told him to spk to Cm if rehab staff can come to hospital to demonstrate their wound/vac care ability to the family!  spoke to CM Romain dotson my conversation and asked to d/w  if any of his requests are feasible.

## 2018-07-11 ENCOUNTER — RESULT REVIEW (OUTPATIENT)
Age: 78
End: 2018-07-11

## 2018-07-11 LAB
ANION GAP SERPL CALC-SCNC: 13 MMOL/L — SIGNIFICANT CHANGE UP (ref 5–17)
APTT BLD: 36.8 SEC — SIGNIFICANT CHANGE UP (ref 27.5–37.4)
BUN SERPL-MCNC: 30 MG/DL — HIGH (ref 7–23)
CALCIUM SERPL-MCNC: 9.6 MG/DL — SIGNIFICANT CHANGE UP (ref 8.4–10.5)
CHLORIDE SERPL-SCNC: 99 MMOL/L — SIGNIFICANT CHANGE UP (ref 96–108)
CO2 SERPL-SCNC: 29 MMOL/L — SIGNIFICANT CHANGE UP (ref 22–31)
CREAT SERPL-MCNC: 0.81 MG/DL — SIGNIFICANT CHANGE UP (ref 0.5–1.3)
GLUCOSE SERPL-MCNC: 123 MG/DL — HIGH (ref 70–99)
HCT VFR BLD CALC: 28.7 % — LOW (ref 34.5–45)
HGB BLD-MCNC: 8.6 G/DL — LOW (ref 11.5–15.5)
INR BLD: 1.36 RATIO — HIGH (ref 0.88–1.16)
MCHC RBC-ENTMCNC: 26.4 PG — LOW (ref 27–34)
MCHC RBC-ENTMCNC: 30 GM/DL — LOW (ref 32–36)
MCV RBC AUTO: 88 FL — SIGNIFICANT CHANGE UP (ref 80–100)
PLATELET # BLD AUTO: 271 K/UL — SIGNIFICANT CHANGE UP (ref 150–400)
POTASSIUM SERPL-MCNC: 4.3 MMOL/L — SIGNIFICANT CHANGE UP (ref 3.5–5.3)
POTASSIUM SERPL-SCNC: 4.3 MMOL/L — SIGNIFICANT CHANGE UP (ref 3.5–5.3)
PROTHROM AB SERPL-ACNC: 15.5 SEC — HIGH (ref 10–13.1)
RBC # BLD: 3.26 M/UL — LOW (ref 3.8–5.2)
RBC # FLD: 17.6 % — HIGH (ref 10.3–14.5)
SODIUM SERPL-SCNC: 141 MMOL/L — SIGNIFICANT CHANGE UP (ref 135–145)
WBC # BLD: 10.52 K/UL — HIGH (ref 3.8–10.5)
WBC # FLD AUTO: 10.52 K/UL — HIGH (ref 3.8–10.5)

## 2018-07-11 PROCEDURE — 97606 NEG PRS WND THER DME>50 SQCM: CPT

## 2018-07-11 PROCEDURE — 99223 1ST HOSP IP/OBS HIGH 75: CPT | Mod: GC,25

## 2018-07-11 PROCEDURE — 11100 BX SKIN SUBCUTANEOUS&/MUCOUS MEMBRANE 1 LESION: CPT | Mod: GC,59

## 2018-07-11 PROCEDURE — 99232 SBSQ HOSP IP/OBS MODERATE 35: CPT

## 2018-07-11 PROCEDURE — 88312 SPECIAL STAINS GROUP 1: CPT | Mod: 26

## 2018-07-11 PROCEDURE — 88305 TISSUE EXAM BY PATHOLOGIST: CPT | Mod: 26

## 2018-07-11 RX ORDER — HYDROMORPHONE HYDROCHLORIDE 2 MG/ML
2 INJECTION INTRAMUSCULAR; INTRAVENOUS; SUBCUTANEOUS
Qty: 0 | Refills: 0 | Status: DISCONTINUED | OUTPATIENT
Start: 2018-07-11 | End: 2018-07-13

## 2018-07-11 RX ORDER — WARFARIN SODIUM 2.5 MG/1
5 TABLET ORAL ONCE
Qty: 0 | Refills: 0 | Status: COMPLETED | OUTPATIENT
Start: 2018-07-11 | End: 2018-07-11

## 2018-07-11 RX ADMIN — Medication 12.5 MILLIGRAM(S): at 05:49

## 2018-07-11 RX ADMIN — Medication 1 TABLET(S): at 05:49

## 2018-07-11 RX ADMIN — MINOCYCLINE HYDROCHLORIDE 100 MILLIGRAM(S): 45 TABLET, EXTENDED RELEASE ORAL at 05:50

## 2018-07-11 RX ADMIN — HYDROMORPHONE HYDROCHLORIDE 4 MILLIGRAM(S): 2 INJECTION INTRAMUSCULAR; INTRAVENOUS; SUBCUTANEOUS at 11:56

## 2018-07-11 RX ADMIN — LORATADINE 10 MILLIGRAM(S): 10 TABLET ORAL at 11:54

## 2018-07-11 RX ADMIN — BUDESONIDE AND FORMOTEROL FUMARATE DIHYDRATE 2 PUFF(S): 160; 4.5 AEROSOL RESPIRATORY (INHALATION) at 05:49

## 2018-07-11 RX ADMIN — HYDROMORPHONE HYDROCHLORIDE 2 MILLIGRAM(S): 2 INJECTION INTRAMUSCULAR; INTRAVENOUS; SUBCUTANEOUS at 21:06

## 2018-07-11 RX ADMIN — Medication 81 MILLIGRAM(S): at 11:50

## 2018-07-11 RX ADMIN — TIOTROPIUM BROMIDE 1 CAPSULE(S): 18 CAPSULE ORAL; RESPIRATORY (INHALATION) at 11:55

## 2018-07-11 RX ADMIN — WARFARIN SODIUM 5 MILLIGRAM(S): 2.5 TABLET ORAL at 21:06

## 2018-07-11 RX ADMIN — MINOCYCLINE HYDROCHLORIDE 100 MILLIGRAM(S): 45 TABLET, EXTENDED RELEASE ORAL at 18:54

## 2018-07-11 RX ADMIN — HYDROMORPHONE HYDROCHLORIDE 4 MILLIGRAM(S): 2 INJECTION INTRAMUSCULAR; INTRAVENOUS; SUBCUTANEOUS at 05:47

## 2018-07-11 RX ADMIN — GABAPENTIN 300 MILLIGRAM(S): 400 CAPSULE ORAL at 13:20

## 2018-07-11 RX ADMIN — BUDESONIDE AND FORMOTEROL FUMARATE DIHYDRATE 2 PUFF(S): 160; 4.5 AEROSOL RESPIRATORY (INHALATION) at 18:55

## 2018-07-11 RX ADMIN — HYDROMORPHONE HYDROCHLORIDE 4 MILLIGRAM(S): 2 INJECTION INTRAMUSCULAR; INTRAVENOUS; SUBCUTANEOUS at 06:02

## 2018-07-11 RX ADMIN — Medication 3 MILLIGRAM(S): at 21:06

## 2018-07-11 RX ADMIN — Medication 1 APPLICATION(S): at 05:51

## 2018-07-11 RX ADMIN — Medication 12.5 MILLIGRAM(S): at 18:54

## 2018-07-11 RX ADMIN — HYDROMORPHONE HYDROCHLORIDE 4 MILLIGRAM(S): 2 INJECTION INTRAMUSCULAR; INTRAVENOUS; SUBCUTANEOUS at 17:50

## 2018-07-11 RX ADMIN — Medication 650 MILLIGRAM(S): at 05:52

## 2018-07-11 RX ADMIN — HYDROMORPHONE HYDROCHLORIDE 2 MILLIGRAM(S): 2 INJECTION INTRAMUSCULAR; INTRAVENOUS; SUBCUTANEOUS at 10:10

## 2018-07-11 RX ADMIN — Medication 1 APPLICATION(S): at 18:52

## 2018-07-11 RX ADMIN — GABAPENTIN 300 MILLIGRAM(S): 400 CAPSULE ORAL at 05:52

## 2018-07-11 RX ADMIN — PANTOPRAZOLE SODIUM 40 MILLIGRAM(S): 20 TABLET, DELAYED RELEASE ORAL at 05:49

## 2018-07-11 RX ADMIN — Medication 650 MILLIGRAM(S): at 06:02

## 2018-07-11 RX ADMIN — HYDROMORPHONE HYDROCHLORIDE 4 MILLIGRAM(S): 2 INJECTION INTRAMUSCULAR; INTRAVENOUS; SUBCUTANEOUS at 18:20

## 2018-07-11 RX ADMIN — HYDROMORPHONE HYDROCHLORIDE 2 MILLIGRAM(S): 2 INJECTION INTRAMUSCULAR; INTRAVENOUS; SUBCUTANEOUS at 09:40

## 2018-07-11 RX ADMIN — GABAPENTIN 300 MILLIGRAM(S): 400 CAPSULE ORAL at 21:06

## 2018-07-11 RX ADMIN — Medication 1 TABLET(S): at 11:56

## 2018-07-11 RX ADMIN — HYDROMORPHONE HYDROCHLORIDE 4 MILLIGRAM(S): 2 INJECTION INTRAMUSCULAR; INTRAVENOUS; SUBCUTANEOUS at 12:56

## 2018-07-11 RX ADMIN — Medication 500 MILLIGRAM(S): at 11:54

## 2018-07-11 RX ADMIN — Medication 1 MILLIGRAM(S): at 11:54

## 2018-07-11 RX ADMIN — Medication 1 TABLET(S): at 21:06

## 2018-07-11 RX ADMIN — HYDROMORPHONE HYDROCHLORIDE 2 MILLIGRAM(S): 2 INJECTION INTRAMUSCULAR; INTRAVENOUS; SUBCUTANEOUS at 21:36

## 2018-07-11 RX ADMIN — SIMVASTATIN 20 MILLIGRAM(S): 20 TABLET, FILM COATED ORAL at 21:07

## 2018-07-11 RX ADMIN — Medication 1 TABLET(S): at 13:20

## 2018-07-11 NOTE — CONSULT NOTE ADULT - ASSESSMENT
78 yo F with a PMH of CAD s/p HERBERT, AS s/p TAVR, bradycardia s/p PM, MVR, DVT/PE, and s/p TKR who has recurrent, deep skin ulcers of unknown etiology despite multiple skin biopsies and tissue cultures.    1. Skin ulcers  - s/p numerous skin biopsies, etiology remains unclear  - New lesions on R lateral thigh are non-specific, however the lesions on the L lateral thigh are suspicious for pyoderma gangrenosum. Suspicion for infectious etiology is low at this time.   - Discussed treatment options at length with the patient and her aide. They opt for a repeat biopsy of a the new lesion on the R lateral thigh  - If biopsy is consistent with PG, will treat with systemic steroids  - If the biopsy result is not clear, will consider empiric trial of intra-lesional kenalog injection  - Patient seen and discussed with Dr. Britt 78 yo F with a PMH of CAD s/p HERBERT, AS s/p TAVR, bradycardia s/p PM, MVR, DVT/PE, and s/p TKR who has recurrent, deep skin ulcers of unknown etiology despite multiple skin biopsies and tissue cultures.    1. Skin ulcers on the lower abdomen and lower extremities. Differential diagnosis includes pyoderma gangrenosum  - s/p numerous skin biopsies, etiology remains unclear as the biopsies have been non-diagnostic. No sign of cutaneous infection on prior biopsies.   - New lesions on R lateral thigh are non-specific, however the lesions on the L lateral thigh are suspicious for pyoderma gangrenosum. At this time the working diagnosis is pyoderma gangrenosum. However, we will perform a biopsy of a newer plaque with superficial ulceration on the right lateral thigh as a double punch for further evaluation.   - Discussed treatment options at length with the patient and her aide. They opt for a repeat biopsy of a the new lesion on the R lateral thigh  - If biopsy is consistent with pyoderma gangrenosum, will recommend initiating treatment with systemic steroids and a steroid sparing agent pending response.   - If the biopsy result does not reveal any organisms, will plan to treat the 2 newest lesions on the right lateral thigh with intralesional Kenalog 40mg/cc.  - Patient seen and discussed with Dr. Britt    We will continue to follow along with you.

## 2018-07-11 NOTE — PROGRESS NOTE ADULT - ASSESSMENT
A/P: s/p Right total knee insertion of spacer, irrigation and debridement, complex wound closure 3/23; readmitted 6/8 for appendicitis    - D/w primary team regarding pain consult  - cont vac/dressing changes to R knee, thigh, and abdomen M/W/F  - remainder of wounds per wound care/derm  - ok for discharge from PRS perspective    Plastic Surgery (pg [facility]: [id number], NS: [phone])

## 2018-07-11 NOTE — CONSULT NOTE ADULT - SUBJECTIVE AND OBJECTIVE BOX
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    Patient was recently admitted from 4/9/18 - 6/6/18. Patient was initially presented for right total knee spacer exchange and I&D with complex wound closure. Previously had a spacer exchanged performed on 3/9 and then was discharged to rehab. Patient then returned on 4/9 with fever and concern for sepsis, found to have positive cultures from knee for MRSA as well as multifocal pneumonia. During admission, patient completed course of Daptomycin for MRSA prosthesis infection and was also treated for multifocal PNA with ?aztreonam. Patient had extensive wound care, followed by Surgery/Plastics/Orthopedics/Id and was discharged to rehab on 6/6 with suppressive minocycline. Admission complicated by lesion over thigh and lower pannus requiring wound vacs.  UA On 6/5 positive, but no culture sent and no antibiotics started.      Patient presents from rehab with fevers and lethargy shortly after discharge. In the ED, Tmax 100.6, HR 85,  /62, O2 98% on 2L NC. Labs showed mild leukocytosis with WBC 13.8, stable anemia Hgb 9.6, and normal platelets 319. Metabolic panel overall unremarkable with Cr 0.89. LFTs unremarkable. VBG with lactate 1.3. UA again positive with >50 WBCs, few bacteria, turbid appearance, and large LE.     Patient had a CT abdomen/pelvis that showed evidence of possible distal appendicitis. Evaluated by Surgery and family declining operative intervention due to high risk. Plastics to aid with wound management, to apply wound vac again in AM to thigh and pannus. Patient not felt to have evidence of wound infection. (07 Jun 2018 22:19)    I saw the pt several times on the last admit. and she had a ferritin that was over 400 and was given at times procrit when the hemoglobin got into the 7 range. The pt has a inflammatory anemia and the hemoglobin now is about 8.6 and she is normocytic hypochromic with a high rdw. Will at this time follow the pt and I see no need now for an extensive workup for this anemia. Will support as needed.      MEDICATIONS  (STANDING):  ALPRAZolam 0.25 milliGRAM(s) Oral once  ascorbic acid 500 milliGRAM(s) Oral daily  aspirin enteric coated 81 milliGRAM(s) Oral daily  betamethasone valerate 0.1% Ointment 1 Application(s) Topical two times a day  buDESOnide 160 MICROgram(s)/formoterol 4.5 MICROgram(s) Inhaler 2 Puff(s) Inhalation two times a day  calcium carbonate 1250 mG + Vitamin D (OsCal 500 + D) 1 Tablet(s) Oral three times a day  Dakins Solution - 1/4 Strength 1 Application(s) Topical two times a day  folic acid 1 milliGRAM(s) Oral daily  gabapentin 300 milliGRAM(s) Oral three times a day  loratadine 10 milliGRAM(s) Oral daily  melatonin 3 milliGRAM(s) Oral at bedtime  metoprolol tartrate 12.5 milliGRAM(s) Oral two times a day  minocycline 100 milliGRAM(s) Oral two times a day  multivitamin 1 Tablet(s) Oral daily  pantoprazole    Tablet 40 milliGRAM(s) Oral before breakfast  polyethylene glycol 3350 17 Gram(s) Oral two times a day  simvastatin 20 milliGRAM(s) Oral at bedtime  tiotropium 18 MICROgram(s) Capsule 1 Capsule(s) Inhalation daily  warfarin 5 milliGRAM(s) Oral once    MEDICATIONS  (PRN):  acetaminophen   Tablet. 650 milliGRAM(s) Oral every 6 hours PRN Mild Pain (1 - 3)  bisacodyl Suppository 10 milliGRAM(s) Rectal daily PRN Constipation  bismuth subsalicylate Liquid 30 milliLiter(s) Oral every 6 hours PRN Cramping/abd pain  HYDROmorphone   Tablet 4 milliGRAM(s) Oral every 4 hours PRN Severe Pain (7 - 10)  HYDROmorphone   Tablet 2 milliGRAM(s) Oral every 3 hours PRN Moderate Pain (4 - 6)  magnesium hydroxide Suspension 30 milliLiter(s) Oral daily PRN Constipation  senna 2 Tablet(s) Oral at bedtime PRN Constipation  simethicone 80 milliGRAM(s) Chew two times a day PRN Gas      T(F): 98.1 (07-11-18 @ 07:29), Max: 99.3 (07-10-18 @ 23:33)  HR: 60 (07-11-18 @ 07:29) (60 - 95)  BP: 99/60 (07-11-18 @ 07:29) (99/60 - 130/80)  RR: 18 (07-11-18 @ 07:29) (16 - 18)  SpO2: 96% (07-11-18 @ 07:29) (94% - 99%)    LABS:    CBC Full  -  ( 11 Jul 2018 08:08 )  WBC Count : 10.52 K/uL  Hemoglobin : 8.6 g/dL  Hematocrit : 28.7 %  Platelet Count - Automated : 271 K/uL  Mean Cell Volume : 88.0 fl  Mean Cell Hemoglobin : 26.4 pg  Mean Cell Hemoglobin Concentration : 30.0 gm/dL  Auto Neutrophil # : x  Auto Lymphocyte # : x  Auto Monocyte # : x  Auto Eosinophil # : x  Auto Basophil # : x  Auto Neutrophil % : x  Auto Lymphocyte % : x  Auto Monocyte % : x  Auto Eosinophil % : x  Auto Basophil % : x    07-11    141  |  99  |  30<H>  ----------------------------<  123<H>  4.3   |  29  |  0.81    Ca    9.6      11 Jul 2018 07:11      PT/INR - ( 11 Jul 2018 08:08 )   PT: 15.5 sec;   INR: 1.36 ratio         PTT - ( 11 Jul 2018 08:08 )  PTT:36.8 sec      ROS:  Negative except for:    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  Artificial cardiac pacemaker: 12/25/17  H/O cardiac catheterization: 11/29/16 HERBERT placed on LAD  H/O endoscopy: with dilatation of esophageal stricture 2013  S/P cataract surgery: x2; 3-4yr ago  S/P gastroplasty: 28 yrs ago  S/P cholecystectomy: more than 15 years ago  S/P total knee replacement: left, 15yr ago  S/P total knee replacement: right, 12yr ago  S/P hysterectomy: 24yr ago      SOCIAL HISTORY:    FAMILY HISTORY:  No pertinent family history in first degree relatives      Allergies    amoxicillin (Other)    Intolerances    IV Contrast (Flushing (Skin); Nausea)      PHYSICAL EXAM as per attending  General: adult in NAD  HEENT: clear oropharynx, anicteric sclera, pink conjunctivae  Neck: supple  CV: normal S1S2 with no murmur rubs or gallops  Lungs: clear to auscultation, no wheezes, no rhales  Abdomen: soft non-tender non-distended, no hepato/splenomegaly  Ext: no clubbing cyanosis or edema  Skin: no rashes and no petichiae  Neuro: alert and oriented X3 no focal deficits    BLOOD SMEAR INTERPRETATION:    RADIOLOGY :

## 2018-07-11 NOTE — PROGRESS NOTE ADULT - SUBJECTIVE AND OBJECTIVE BOX
pain controlled  still not oob    abdominal wound - vac  RLE  thigh - dressing per wound care teams, lateral wound wet to dry again per prs/wc teams, I saw the wound with Dr. Mg, some necrotic adipose tissue but no overt e/o infection  knee wound with vac, I saw the wound which is granulating and kimber  remainder of wounds dressed by wound care  5/5 ta/ehl/gcs  silt l4-s1  2+ dp pulse    ROS: depressed

## 2018-07-11 NOTE — PROGRESS NOTE ADULT - ASSESSMENT
no plan for orthopedic surgical intervention per family and patient preference  vac and wound care per PRS and wound care teams  PO abx per ID team, minocycline  encourage patient to spend majority of bed oob in bedside chair as able, has not been oob recently, PLEASE HAVE PT COME BY TO MOBILIZE PATIENT, she must remain 50% wb on the RLE and NO KNEE MOTION allowed, knee immobilizer if oob  coumadin, inr goal 2-3  continue to optimize nutrition  agree with pain team consult, needs to switch to PO regimen  continue to involve psych, more emotional today  orthopedic issues are stable for discharge to rehab, please make sure patient and family is okay with discharge before initiating planning process    I personally met with patient's daughter today as well as with Dr. Mg and together we evaluated wounds and discussed care plan. Will need continued management of wounds by wound care team. Knee wound healing well, abdominal wound kimber, bilateral thigh wounds still need to progress. Continue offloading. Please see WC/PRC recs for soft tissue recs.

## 2018-07-11 NOTE — CONSULT NOTE ADULT - ASSESSMENT
I saw the pt several times on the last admit. and she had a ferritin that was over 400 and was given at times procrit when the hemoglobin got into the 7 range. The pt has a inflammatory anemia and the hemoglobin now is about 8.6 and she is normocytic hypochromic with a high rdw. Will at this time follow the pt and I see no need now for an extensive workup for this anemia. Will support as needed.

## 2018-07-11 NOTE — CONSULT NOTE ADULT - ATTENDING COMMENTS
I agree with the above note and have personally seen and examined this patient. All pertinent films have been reviewed. Please refer to clinical documentation of the history, physical examinations, data summary, and both assessment and plan as documented above and with which I agree.    Nate Bass MD  Attending Orthopedic Surgeon
I saw and examined the pt and discussed the tx plan with the House Staff. I agree with the exam and plan as documented in the above consult which I edited as indicated.  Awaiting final CT reading - pt very poor surgical candidate.   Alma Stephens MD
Rule out pyoderma gangrenosum. Repeat punch biopsy today of a newer lesion. Will compare to prior biopsies. If no sign of infection, will attempt a test of intralesional Kenalog to the newer lesions.   Agree with resident assessment and plan.   Radha Britt MD [facility]
76 y/o F with urticaria a few days ago, without any pruritis or urticaria now.  Plan as above.  No acute treatment.  We prefer long acting, non-sedating antihistamines (fexofenadine, loratadine) over short acting, sedating antihistamines (diphenhydramine, hydroxyzine).  But, without any symptoms no chronic antihistamine is required currently.  NO additional testing now.  No folllowup required.

## 2018-07-11 NOTE — PROGRESS NOTE ADULT - SUBJECTIVE AND OBJECTIVE BOX
SUBJECTIVE: Pt seen, chart reviewed.    Patient examined in conjunction with wound PT, Plastics , and Orthopedics  Daughter also present  Wound care PA note of 2  days ago reviewed and discussed  Etiology of leg and abdominal wounds remains obscure, but all wounds are generally improved  Plastics debrided larger lateral wound of right thigh , but tolerance limits debridement done  Irrigation VAC placed on largest wound of right lateral thigh today  Remains at bedrest, emotionally labile       Allergies    amoxicillin (Other)    Intolerances    IV Contrast (Flushing (Skin); Nausea)      aspirin enteric coated 81 milliGRAM(s) Oral daily  minocycline 100 milliGRAM(s) Oral two times a day  warfarin 5 milliGRAM(s) Oral once      	    Physical Exam:  Vital Signs Last 24 Hrs  T(C): 36.7 (11 Jul 2018 07:29), Max: 37.4 (10 Jul 2018 23:33)  T(F): 98.1 (11 Jul 2018 07:29), Max: 99.3 (10 Jul 2018 23:33)  HR: 60 (11 Jul 2018 07:29) (60 - 95)  BP: 99/60 (11 Jul 2018 07:29) (99/60 - 130/80)  BP(mean): --  RR: 18 (11 Jul 2018 07:29) (16 - 18)  SpO2: 96% (11 Jul 2018 07:29) (94% - 99%)        LABS:                        8.6    10.52 )-----------( 271      ( 11 Jul 2018 08:08 )             28.7     PT/INR - ( 11 Jul 2018 08:08 )   PT: 15.5 sec;   INR: 1.36 ratio         PTT - ( 11 Jul 2018 08:08 )  PTT:36.8 sec     Daughter voiced strong preference for continued in patient care      F/u as outpatient in Wound Center [address] [facility] [phone] SUBJECTIVE: Pt seen, chart reviewed.    Patient examined in conjunction with wound PT, Plastics , and Orthopedics  Daughter also present  Wound care PA note of 2  days ago reviewed and discussed  Etiology of leg and abdominal wounds remains obscure, but all wounds are generally improved  Plastics debrided larger lateral wound of right thigh , but tolerance limits debridement done  Irrigation VAC placed on largest wound of right lateral thigh today  Remains at bedrest, emotionally labile    spoke with  at length upon return to office today  He inquired about the use of BFI  antiseptic  powder- a product that he is familiar with since WW2, and in his practice as a Pharmacist       Allergies    amoxicillin (Other)    Intolerances    IV Contrast (Flushing (Skin); Nausea)      aspirin enteric coated 81 milliGRAM(s) Oral daily  minocycline 100 milliGRAM(s) Oral two times a day  warfarin 5 milliGRAM(s) Oral once      	    Physical Exam:  Vital Signs Last 24 Hrs  T(C): 36.7 (11 Jul 2018 07:29), Max: 37.4 (10 Jul 2018 23:33)  T(F): 98.1 (11 Jul 2018 07:29), Max: 99.3 (10 Jul 2018 23:33)  HR: 60 (11 Jul 2018 07:29) (60 - 95)  BP: 99/60 (11 Jul 2018 07:29) (99/60 - 130/80)  BP(mean): --  RR: 18 (11 Jul 2018 07:29) (16 - 18)  SpO2: 96% (11 Jul 2018 07:29) (94% - 99%)        LABS:                        8.6    10.52 )-----------( 271      ( 11 Jul 2018 08:08 )             28.7     PT/INR - ( 11 Jul 2018 08:08 )   PT: 15.5 sec;   INR: 1.36 ratio         PTT - ( 11 Jul 2018 08:08 )  PTT:36.8 sec     Daughter voiced strong preference for continued in patient care      F/u as outpatient in Wound Center [address] [place] [address] [phone] SUBJECTIVE: Pt seen, chart reviewed.    Patient examined in conjunction with wound PT, Plastics , and Orthopedics  Daughter also present  Wound care PA note of 2  days ago reviewed and discussed  Etiology of leg and abdominal wounds remains obscure, but all wounds are generally improved  Plastics debrided larger lateral wound of right thigh , but tolerance limits debridement done  Irrigation VAC placed on largest wound of right lateral thigh today  Remains at bedrest, emotionally labile    spoke with , Elvin, at length upon return to office today  He inquired about the use of BFI  antiseptic  powder- a product that he is familiar with since 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, and in his practice as a Pharmacist       Allergies    amoxicillin (Other)    Intolerances    IV Contrast (Flushing (Skin); Nausea)      aspirin enteric coated 81 milliGRAM(s) Oral daily  minocycline 100 milliGRAM(s) Oral two times a day  warfarin 5 milliGRAM(s) Oral once      	    Physical Exam:  Vital Signs Last 24 Hrs  T(C): 36.7 (11 Jul 2018 07:29), Max: 37.4 (10 Jul 2018 23:33)  T(F): 98.1 (11 Jul 2018 07:29), Max: 99.3 (10 Jul 2018 23:33)  HR: 60 (11 Jul 2018 07:29) (60 - 95)  BP: 99/60 (11 Jul 2018 07:29) (99/60 - 130/80)  BP(mean): --  RR: 18 (11 Jul 2018 07:29) (16 - 18)  SpO2: 96% (11 Jul 2018 07:29) (94% - 99%)        LABS:                        8.6    10.52 )-----------( 271      ( 11 Jul 2018 08:08 )             28.7     PT/INR - ( 11 Jul 2018 08:08 )   PT: 15.5 sec;   INR: 1.36 ratio         PTT - ( 11 Jul 2018 08:08 )  PTT:36.8 sec     Daughter voiced strong preference for continued in patient care      F/u as outpatient in Wound Center [address] [place] [address] [phone]

## 2018-07-11 NOTE — PROGRESS NOTE ADULT - ASSESSMENT
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 completed 10 days meropenem.    # abd wound, multiple bl thigh wounds  continue vac to abdomen, rt Knee, vac changes M/W/F  appreciate wound care recs  6/22 left thigh and left arm punch biopsies resulted and consistent with urticaria. evaluated by allergy.  derm does not believe the ulcers are of neoplastics, inflammatory, vascular/vasculitic or infectious etiology  tissue culture grew coag neg staph which can be normal roopa / colonized    # Pain control  pain medication increased yesterday  hospital does not currently have pain management specialty  cont dilaudid 2q3 prn mod pain, [id number] severe pain  gabapentin increased to 300tid  bowel regimen    # polymyalgia rheumatica  steroids on hold to allow for wound healing  no signs of active rheum dz, evaluated by rheum 6/29    # TKR wound-mrsa  chr minocycline  ID f/u    # chr Anemia  stable, monitor h/h closely    # S/P TAVR (transcatheter aortic valve replacement)  continue Coumadin, daily INR    # adjustment disorder with depressed mood  xanax prn    dispo:   discharge has been difficult as family has unrealistic expectation to stay in the hospital until wounds heal.    pt cleared for discharge by all subspecialities  I spoke with Mr Rdz last week regarding discharge  Dr Murphy spoke with Mr Rdz yesterday regarding discharge again    Aid informed me today the son requesting ID and derm to follow up

## 2018-07-11 NOTE — PROGRESS NOTE ADULT - SUBJECTIVE AND OBJECTIVE BOX
Patient is a 77y old  Female who presents with a chief complaint of fever, lethargy (07 Jun 2018 22:19)      SUBJECTIVE / OVERNIGHT EVENTS:    Patient seen and examined. denies pain today. denies cp sob.       Vital Signs Last 24 Hrs  T(C): 36.7 (11 Jul 2018 07:29), Max: 37.4 (10 Jul 2018 23:33)  T(F): 98.1 (11 Jul 2018 07:29), Max: 99.3 (10 Jul 2018 23:33)  HR: 60 (11 Jul 2018 07:29) (60 - 95)  BP: 99/60 (11 Jul 2018 07:29) (99/60 - 130/80)  BP(mean): --  RR: 18 (11 Jul 2018 07:29) (16 - 18)  SpO2: 96% (11 Jul 2018 07:29) (94% - 99%)  I&O's Summary    10 Jul 2018 07:01  -  11 Jul 2018 07:00  --------------------------------------------------------  IN: 680 mL / OUT: 500 mL / NET: 180 mL    11 Jul 2018 07:01  -  11 Jul 2018 10:42  --------------------------------------------------------  IN: 120 mL / OUT: 0 mL / NET: 120 mL        PE:  GENERAL: NAD, AAOx3, obese  HEAD:  Atraumatic, Normocephalic  EYES: EOMI, PERRLA, conjunctiva and sclera clear  NECK: Supple, No JVD  CHEST/LUNG: CTABL, No wheeze  HEART: Regular rate and rhythm; no murmur  ABDOMEN: Soft, Nontender, Nondistended; Bowel sounds present  EXTREMITIES:  2+ Peripheral Pulses, right medial thigh wound dressing, wound vac to right knee, lower abd wound vac, rt thigh wound vac dced, rt lateral thigh wound dressing  SKIN: No rashes or lesions  NEURO: No focal deficits    LABS:                        8.6    10.52 )-----------( 271      ( 11 Jul 2018 08:08 )             28.7     07-11    141  |  99  |  30<H>  ----------------------------<  123<H>  4.3   |  29  |  0.81    Ca    9.6      11 Jul 2018 07:11      PT/INR - ( 11 Jul 2018 08:08 )   PT: 15.5 sec;   INR: 1.36 ratio         PTT - ( 11 Jul 2018 08:08 )  PTT:36.8 sec  CAPILLARY BLOOD GLUCOSE                RADIOLOGY & ADDITIONAL TESTS:    Imaging Personally Reviewed:  [x] YES  [ ] NO    Consultant(s) Notes Reviewed:  [x] YES  [ ] NO    MEDICATIONS  (STANDING):  ALPRAZolam 0.25 milliGRAM(s) Oral once  ascorbic acid 500 milliGRAM(s) Oral daily  aspirin enteric coated 81 milliGRAM(s) Oral daily  betamethasone valerate 0.1% Ointment 1 Application(s) Topical two times a day  buDESOnide 160 MICROgram(s)/formoterol 4.5 MICROgram(s) Inhaler 2 Puff(s) Inhalation two times a day  calcium carbonate 1250 mG + Vitamin D (OsCal 500 + D) 1 Tablet(s) Oral three times a day  Dakins Solution - 1/4 Strength 1 Application(s) Topical two times a day  folic acid 1 milliGRAM(s) Oral daily  gabapentin 300 milliGRAM(s) Oral three times a day  loratadine 10 milliGRAM(s) Oral daily  melatonin 3 milliGRAM(s) Oral at bedtime  metoprolol tartrate 12.5 milliGRAM(s) Oral two times a day  minocycline 100 milliGRAM(s) Oral two times a day  multivitamin 1 Tablet(s) Oral daily  pantoprazole    Tablet 40 milliGRAM(s) Oral before breakfast  polyethylene glycol 3350 17 Gram(s) Oral two times a day  simvastatin 20 milliGRAM(s) Oral at bedtime  tiotropium 18 MICROgram(s) Capsule 1 Capsule(s) Inhalation daily  warfarin 5 milliGRAM(s) Oral once    MEDICATIONS  (PRN):  acetaminophen   Tablet. 650 milliGRAM(s) Oral every 6 hours PRN Mild Pain (1 - 3)  bisacodyl Suppository 10 milliGRAM(s) Rectal daily PRN Constipation  bismuth subsalicylate Liquid 30 milliLiter(s) Oral every 6 hours PRN Cramping/abd pain  HYDROmorphone   Tablet 4 milliGRAM(s) Oral every 4 hours PRN Severe Pain (7 - 10)  HYDROmorphone   Tablet 2 milliGRAM(s) Oral every 3 hours PRN Moderate Pain (4 - 6)  magnesium hydroxide Suspension 30 milliLiter(s) Oral daily PRN Constipation  senna 2 Tablet(s) Oral at bedtime PRN Constipation  simethicone 80 milliGRAM(s) Chew two times a day PRN Gas      Care Discussed with Consultants/Other Providers [x] YES  [ ] NO    HEALTH ISSUES - PROBLEM Dx:  Urticaria: Urticaria  Joint infection: Joint infection  Other problems related to medical facilities and other health care: Other problems related to medical facilities and other health care  S/P TAVR (transcatheter aortic valve replacement): S/P TAVR (transcatheter aortic valve replacement)  Acute cystitis without hematuria: Acute cystitis without hematuria  Wound infection: Wound infection  Acute appendicitis, unspecified acute appendicitis type: Acute appendicitis, unspecified acute appendicitis type  Fever, unspecified fever cause: Fever, unspecified fever cause

## 2018-07-11 NOTE — CONSULT NOTE ADULT - SUBJECTIVE AND OBJECTIVE BOX
HPI:  78 yo F with a PMH of CAD s/p HERBERT, AS s/p TAVR, bradycardia s/p PM, MVR, DVT/PE, and s/p TKR who presented on 6/7 with appendicitis.  Dermatology has been following her for ulcers since 5/23/18 during her previous admission. We are re-consulted for a new lesion on the R thigh. Painful. Noticed on Monday. Growing. Worried that this too will ulcerate. Otherwise well.    When Pt was initially consulted in late May for R knee ulcer, the ulcer was already debrided and it was difficult to understand how the ulcer initially arose. At the time of the initial, pt already had wound VAC placed in lower abdomen and R anterior thigh. Over the course of the month, pt developed new ulcers and lesions.    Biopsies were obtained from ulcers on R medial knee on May 23 (debrided prior to biopsy) and L inner thigh on Jun 13 (new ulcer) along with tissue cultures. The biopsies showed mixed inflammatory patterns. Bacterial tissue cx's were reviewed by ID, and they were thought to be results of colonization. Mycobacteria and fungal cx were negative. Another tissue cx obtained from subcutaneous tissue from R lateral thigh on Jun 18 to rule out atypical mycobacteria has been negative.    Another biopsy from L wrist and L thigh (pt's daughter reported the thigh lesion appeared to be how all the ulcers started) on Jun 22 revealed urticaria and excoriation.        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  Artificial cardiac pacemaker: 12/25/17  H/O cardiac catheterization: 11/29/16 HERBERT placed on LAD  H/O endoscopy: with dilatation of esophageal stricture 2013  S/P cataract surgery: x2; 3-4yr ago  S/P gastroplasty: 28 yrs ago  S/P cholecystectomy: more than 15 years ago  S/P total knee replacement: left, 15yr ago  S/P total knee replacement: right, 12yr ago  S/P hysterectomy: 24yr ago      REVIEW OF SYSTEMS    General: no fevers/chills, no lethargy	    Skin/Breast: see HPI  	  Ophthalmologic: no eye pain or change in vision  	  ENMT: no dysphagia or change in hearing    Respiratory and Thorax: no SOB or cough  	  Cardiovascular: no palpitations or chest pain    Gastrointestinal: no abdominal pain or blood in stool     Genitourinary: no dysuria or frequency    Musculoskeletal: no joint pains    Neurological: no weakness, numbness , or tingling    MEDICATIONS  (STANDING):  ALPRAZolam 0.25 milliGRAM(s) Oral once  ascorbic acid 500 milliGRAM(s) Oral daily  aspirin enteric coated 81 milliGRAM(s) Oral daily  betamethasone valerate 0.1% Ointment 1 Application(s) Topical two times a day  buDESOnide 160 MICROgram(s)/formoterol 4.5 MICROgram(s) Inhaler 2 Puff(s) Inhalation two times a day  calcium carbonate 1250 mG + Vitamin D (OsCal 500 + D) 1 Tablet(s) Oral three times a day  Dakins Solution - 1/4 Strength 1 Application(s) Topical two times a day  folic acid 1 milliGRAM(s) Oral daily  gabapentin 300 milliGRAM(s) Oral three times a day  loratadine 10 milliGRAM(s) Oral daily  melatonin 3 milliGRAM(s) Oral at bedtime  metoprolol tartrate 12.5 milliGRAM(s) Oral two times a day  minocycline 100 milliGRAM(s) Oral two times a day  multivitamin 1 Tablet(s) Oral daily  pantoprazole    Tablet 40 milliGRAM(s) Oral before breakfast  polyethylene glycol 3350 17 Gram(s) Oral two times a day  simvastatin 20 milliGRAM(s) Oral at bedtime  tiotropium 18 MICROgram(s) Capsule 1 Capsule(s) Inhalation daily  warfarin 5 milliGRAM(s) Oral once    MEDICATIONS  (PRN):  acetaminophen   Tablet. 650 milliGRAM(s) Oral every 6 hours PRN Mild Pain (1 - 3)  bisacodyl Suppository 10 milliGRAM(s) Rectal daily PRN Constipation  bismuth subsalicylate Liquid 30 milliLiter(s) Oral every 6 hours PRN Cramping/abd pain  HYDROmorphone   Tablet 4 milliGRAM(s) Oral every 4 hours PRN Severe Pain (7 - 10)  HYDROmorphone   Tablet 2 milliGRAM(s) Oral every 3 hours PRN Moderate Pain (4 - 6)  magnesium hydroxide Suspension 30 milliLiter(s) Oral daily PRN Constipation  senna 2 Tablet(s) Oral at bedtime PRN Constipation  simethicone 80 milliGRAM(s) Chew two times a day PRN Gas      Allergies    amoxicillin (Other)    Intolerances    IV Contrast (Flushing (Skin); Nausea)      SOCIAL HISTORY:    FAMILY HISTORY:  No pertinent family history in first degree relatives      Vital Signs Last 24 Hrs  T(C): 36.7 (11 Jul 2018 15:51), Max: 37.4 (10 Jul 2018 23:33)  T(F): 98.1 (11 Jul 2018 15:51), Max: 99.3 (10 Jul 2018 23:33)  HR: 88 (11 Jul 2018 15:51) (60 - 92)  BP: 99/67 (11 Jul 2018 15:51) (99/60 - 116/77)  BP(mean): --  RR: 18 (11 Jul 2018 15:51) (16 - 18)  SpO2: 96% (11 Jul 2018 07:29) (94% - 96%)    PHYSICAL EXAM:     The patient was alert and oriented X 3, well nourished, and in no  apparent distress.  OP showed no ulcerations  There was no visible lymphadenopathy.  Conjunctiva were non injected  There was no clubbing or edema of extremities.  The scalp, hair, face, eyebrows, lips, OP, neck, chest, back,   extremities X 4, nails were examined.  There was no hyperhidrosis or bromhidrosis.    Of note on skin exam:   R lateral thigh with 2 indurated plaque with central hemorrhagic crust/eschar and 3 cm, well circumscribed, full thickness ulcer with fibrinous base and minimal surrounding erythema.    L lateral thigh: DEEP 2 cm oval ulcer, well-demarcated with undermining and surrounding erythema    Wound vac on lower abdomen, R thigh. L thigh    LABS:                        8.6    10.52 )-----------( 271      ( 11 Jul 2018 08:08 )             28.7     07-11    141  |  99  |  30<H>  ----------------------------<  123<H>  4.3   |  29  |  0.81    Ca    9.6      11 Jul 2018 07:11      PT/INR - ( 11 Jul 2018 08:08 )   PT: 15.5 sec;   INR: 1.36 ratio         PTT - ( 11 Jul 2018 08:08 )  PTT:36.8 sec HPI:  76 yo F with a PMH of CAD s/p HERBERT, AS s/p TAVR, bradycardia s/p PM, MVR, DVT/PE, and s/p TKR who presented on 6/7 with appendicitis.  Dermatology has been following her for ulcers since 5/23/18 during her previous admission. We are re-consulted for a new lesion on the R thigh. Painful. Noticed on Monday. Growing. Worried that this too will ulcerate. Otherwise well.    When Pt was initially consulted in late May for R knee ulcer, the ulcer was already debrided and it was difficult to understand how the ulcer initially arose. At the time of the initial, pt already had wound VAC placed in lower abdomen and R anterior thigh. Over the course of the month, pt developed new ulcers and lesions.    Biopsies were obtained from ulcers on R medial knee on May 23 (debrided prior to biopsy) and L inner thigh on Jun 13 (new ulcer) along with tissue cultures. The biopsies showed mixed inflammatory patterns. Bacterial tissue cx's were reviewed by ID, and they were thought to be results of colonization. Mycobacteria and fungal cx were negative. Another tissue cx obtained from subcutaneous tissue from R lateral thigh on Jun 18 to rule out atypical mycobacteria has been negative.    Another biopsy from L wrist and L thigh (pt's daughter reported the thigh lesion appeared to be how all the ulcers started) on Jun 22 revealed urticaria and excoriation.      Seen with her aid, Lissa.     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  Artificial cardiac pacemaker: 12/25/17  H/O cardiac catheterization: 11/29/16 HERBERT placed on LAD  H/O endoscopy: with dilatation of esophageal stricture 2013  S/P cataract surgery: x2; 3-4yr ago  S/P gastroplasty: 28 yrs ago  S/P cholecystectomy: more than 15 years ago  S/P total knee replacement: left, 15yr ago  S/P total knee replacement: right, 12yr ago  S/P hysterectomy: 24yr ago      REVIEW OF SYSTEMS    General: no fevers/chills, no lethargy	    Skin/Breast: see HPI  	  Ophthalmologic: no eye pain or change in vision  	  ENMT: no dysphagia or change in hearing    Respiratory and Thorax: no SOB or cough  	  Cardiovascular: no palpitations or chest pain    Gastrointestinal: no abdominal pain or blood in stool     Genitourinary: no dysuria or frequency    Musculoskeletal: no joint pains    Neurological: no weakness, numbness , or tingling    MEDICATIONS  (STANDING):  ALPRAZolam 0.25 milliGRAM(s) Oral once  ascorbic acid 500 milliGRAM(s) Oral daily  aspirin enteric coated 81 milliGRAM(s) Oral daily  betamethasone valerate 0.1% Ointment 1 Application(s) Topical two times a day  buDESOnide 160 MICROgram(s)/formoterol 4.5 MICROgram(s) Inhaler 2 Puff(s) Inhalation two times a day  calcium carbonate 1250 mG + Vitamin D (OsCal 500 + D) 1 Tablet(s) Oral three times a day  Dakins Solution - 1/4 Strength 1 Application(s) Topical two times a day  folic acid 1 milliGRAM(s) Oral daily  gabapentin 300 milliGRAM(s) Oral three times a day  loratadine 10 milliGRAM(s) Oral daily  melatonin 3 milliGRAM(s) Oral at bedtime  metoprolol tartrate 12.5 milliGRAM(s) Oral two times a day  minocycline 100 milliGRAM(s) Oral two times a day  multivitamin 1 Tablet(s) Oral daily  pantoprazole    Tablet 40 milliGRAM(s) Oral before breakfast  polyethylene glycol 3350 17 Gram(s) Oral two times a day  simvastatin 20 milliGRAM(s) Oral at bedtime  tiotropium 18 MICROgram(s) Capsule 1 Capsule(s) Inhalation daily  warfarin 5 milliGRAM(s) Oral once    MEDICATIONS  (PRN):  acetaminophen   Tablet. 650 milliGRAM(s) Oral every 6 hours PRN Mild Pain (1 - 3)  bisacodyl Suppository 10 milliGRAM(s) Rectal daily PRN Constipation  bismuth subsalicylate Liquid 30 milliLiter(s) Oral every 6 hours PRN Cramping/abd pain  HYDROmorphone   Tablet 4 milliGRAM(s) Oral every 4 hours PRN Severe Pain (7 - 10)  HYDROmorphone   Tablet 2 milliGRAM(s) Oral every 3 hours PRN Moderate Pain (4 - 6)  magnesium hydroxide Suspension 30 milliLiter(s) Oral daily PRN Constipation  senna 2 Tablet(s) Oral at bedtime PRN Constipation  simethicone 80 milliGRAM(s) Chew two times a day PRN Gas      Allergies    amoxicillin (Other)    Intolerances    IV Contrast (Flushing (Skin); Nausea)      SOCIAL HISTORY: Has an aid, Lissa    FAMILY HISTORY:  No pertinent family history in first degree relatives      Vital Signs Last 24 Hrs  T(C): 36.7 (11 Jul 2018 15:51), Max: 37.4 (10 Jul 2018 23:33)  T(F): 98.1 (11 Jul 2018 15:51), Max: 99.3 (10 Jul 2018 23:33)  HR: 88 (11 Jul 2018 15:51) (60 - 92)  BP: 99/67 (11 Jul 2018 15:51) (99/60 - 116/77)  BP(mean): --  RR: 18 (11 Jul 2018 15:51) (16 - 18)  SpO2: 96% (11 Jul 2018 07:29) (94% - 96%)    PHYSICAL EXAM:     The patient was alert and oriented X 3, well nourished, and in no  apparent distress.  OP showed no ulcerations  There was no visible lymphadenopathy.  Conjunctiva were non injected  There was no clubbing or edema of extremities.  The scalp, hair, face, eyebrows, lips, OP, neck, chest, back,   extremities X 4, nails were examined.  There was no hyperhidrosis or bromhidrosis.    Of note on skin exam:   R lateral thigh with 2 indurated plaques with central hemorrhagic crust/eschar and 3 cm, well circumscribed, full thickness ulcer with fibrinous base and minimal surrounding erythema.    L lateral thigh: DEEP 2 cm oval ulcer, well-demarcated with undermining and surrounding erythema    Wound vac on lower abdomen, R thigh. L thigh    LABS:                        8.6    10.52 )-----------( 271      ( 11 Jul 2018 08:08 )             28.7     07-11    141  |  99  |  30<H>  ----------------------------<  123<H>  4.3   |  29  |  0.81    Ca    9.6      11 Jul 2018 07:11      PT/INR - ( 11 Jul 2018 08:08 )   PT: 15.5 sec;   INR: 1.36 ratio         PTT - ( 11 Jul 2018 08:08 )  PTT:36.8 sec

## 2018-07-11 NOTE — PROGRESS NOTE ADULT - SUBJECTIVE AND OBJECTIVE BOX
CC: f/u for various wounds over tissue with high adipost    Patient reports  she is tired of getting her dressing changed  REVIEW OF SYSTEMS:  All other review of systems negative (Comprehensive ROS)    Antimicrobials Day #  :  minocycline 100 milliGRAM(s) Oral two times a day    Other Medications Reviewed    T(F): 98.1 (07-11-18 @ 15:51), Max: 99.3 (07-10-18 @ 23:33)  HR: 88 (07-11-18 @ 15:51)  BP: 99/67 (07-11-18 @ 15:51)  RR: 18 (07-11-18 @ 15:51)  SpO2: 96% (07-11-18 @ 07:29)  Wt(kg): --    PHYSICAL EXAM:  General: alert, no acute distress  Eyes:  anicteric, no conjunctival injection, no discharge  Oropharynx: no lesions or injection 	  Neck: supple, without adenopathy  Lungs: clear to auscultation  Heart: regular rate and rhythm; no murmur, rubs or gallops  Abdomen: soft, nondistended, nontender, without mass or organomegaly vac over lower abdomen  Skin: no lesions  Extremities: clean ulcers on upper right thigh, new bx wound with purplish outside, no cellulitis no odor. She would not allow any other dressings removed or lifted.   Neurologic: alert, oriented, moves all extremities    LAB RESULTS:                        8.6    10.52 )-----------( 271      ( 11 Jul 2018 08:08 )             28.7     07-11    141  |  99  |  30<H>  ----------------------------<  123<H>  4.3   |  29  |  0.81    Ca    9.6      11 Jul 2018 07:11          MICROBIOLOGY:  RECENT CULTURES:    Culture - Acid Fast - Tissue w/Smear (06.18.18 @ 21:01)    Specimen Source: .Tissue Other, R buttock skin    Acid Fast Bacilli Smear:   No acid fast bacilli seen by fluorochrome stain    Culture Results:   No growth at 1 week.    Culture - Fungal, Tissue (06.13.18 @ 19:06)    Specimen Source: .Tissue Thigh skin    Culture Results:   No fungus isolated at 1 week. No additional interim reports will be  issued unless there is a change in culture status.      Assessment:  Patient with poorly healing right tkr wound s/p rudy , spacer for strept infection then change of spacer again for open wound with mrsa grown now on suppressive minocycline. She has developed multiple areas of purplish wounds that show nothing consistent on biopsy and some happen without any provocation. Culture for afb and fungus negative and the cns grown is just colonization. I suspect these wound are either pyoderma gangrenosa or a neutrophilic dermatosis or panniculitis .   Plan  Await new skin biopsy  Continue suppressive minocycline for knee

## 2018-07-11 NOTE — PROGRESS NOTE ADULT - SUBJECTIVE AND OBJECTIVE BOX
Plastic Surgery Progress Note (pg [facility]: [id number], [facility]: [phone])    SUBJECTIVE:  The patient was seen and examined. No acute events overnight.    OBJECTIVE:     ** VITAL SIGNS / I&O's **    Vital Signs Last 24 Hrs  T(C): 36.7 (11 Jul 2018 15:51), Max: 37.4 (10 Jul 2018 23:33)  T(F): 98.1 (11 Jul 2018 15:51), Max: 99.3 (10 Jul 2018 23:33)  HR: 88 (11 Jul 2018 15:51) (60 - 92)  BP: 99/67 (11 Jul 2018 15:51) (99/60 - 116/77)  BP(mean): --  RR: 18 (11 Jul 2018 15:51) (16 - 18)  SpO2: 96% (11 Jul 2018 07:29) (94% - 96%)      10 Jul 2018 07:01  -  11 Jul 2018 07:00  --------------------------------------------------------  IN:    Oral Fluid: 680 mL  Total IN: 680 mL    OUT:    Voided: 500 mL  Total OUT: 500 mL    Total NET: 180 mL      11 Jul 2018 07:01  -  11 Jul 2018 16:06  --------------------------------------------------------  IN:    Oral Fluid: 440 mL  Total IN: 440 mL    OUT:    Voided: 600 mL  Total OUT: 600 mL    Total NET: -160 mL          ** PHYSICAL EXAM **    -- CONSTITUTIONAL: Alert, NAD   -- ABDOMEN: central abd VAC intact and set to suction.   -- EXTREMITIES: debridement of lateral wound of R thigh, irrigation vac placed on wound of R lateral thigh. R medial thigh- wet to dry dressing applied. R knee vac is off irrigation, holding suction.         ** LABS **                          8.6    10.52 )-----------( 271      ( 11 Jul 2018 08:08 )             28.7     11 Jul 2018 07:11    141    |  99     |  30     ----------------------------<  123    4.3     |  29     |  0.81     Ca    9.6        11 Jul 2018 07:11      PT/INR - ( 11 Jul 2018 08:08 )   PT: 15.5 sec;   INR: 1.36 ratio         PTT - ( 11 Jul 2018 08:08 )  PTT:36.8 sec  CAPILLARY BLOOD GLUCOSE                    ** MEDICATIONS **  MEDICATIONS  (STANDING):  ALPRAZolam 0.25 milliGRAM(s) Oral once  ascorbic acid 500 milliGRAM(s) Oral daily  aspirin enteric coated 81 milliGRAM(s) Oral daily  betamethasone valerate 0.1% Ointment 1 Application(s) Topical two times a day  buDESOnide 160 MICROgram(s)/formoterol 4.5 MICROgram(s) Inhaler 2 Puff(s) Inhalation two times a day  calcium carbonate 1250 mG + Vitamin D (OsCal 500 + D) 1 Tablet(s) Oral three times a day  Dakins Solution - 1/4 Strength 1 Application(s) Topical two times a day  folic acid 1 milliGRAM(s) Oral daily  gabapentin 300 milliGRAM(s) Oral three times a day  loratadine 10 milliGRAM(s) Oral daily  melatonin 3 milliGRAM(s) Oral at bedtime  metoprolol tartrate 12.5 milliGRAM(s) Oral two times a day  minocycline 100 milliGRAM(s) Oral two times a day  multivitamin 1 Tablet(s) Oral daily  pantoprazole    Tablet 40 milliGRAM(s) Oral before breakfast  polyethylene glycol 3350 17 Gram(s) Oral two times a day  simvastatin 20 milliGRAM(s) Oral at bedtime  tiotropium 18 MICROgram(s) Capsule 1 Capsule(s) Inhalation daily  warfarin 5 milliGRAM(s) Oral once    MEDICATIONS  (PRN):  acetaminophen   Tablet. 650 milliGRAM(s) Oral every 6 hours PRN Mild Pain (1 - 3)  bisacodyl Suppository 10 milliGRAM(s) Rectal daily PRN Constipation  bismuth subsalicylate Liquid 30 milliLiter(s) Oral every 6 hours PRN Cramping/abd pain  HYDROmorphone   Tablet 4 milliGRAM(s) Oral every 4 hours PRN Severe Pain (7 - 10)  HYDROmorphone   Tablet 2 milliGRAM(s) Oral every 3 hours PRN Moderate Pain (4 - 6)  magnesium hydroxide Suspension 30 milliLiter(s) Oral daily PRN Constipation  senna 2 Tablet(s) Oral at bedtime PRN Constipation  simethicone 80 milliGRAM(s) Chew two times a day PRN Gas

## 2018-07-12 LAB
ANION GAP SERPL CALC-SCNC: 11 MMOL/L — SIGNIFICANT CHANGE UP (ref 5–17)
APTT BLD: 39.1 SEC — HIGH (ref 27.5–37.4)
BUN SERPL-MCNC: 30 MG/DL — HIGH (ref 7–23)
CALCIUM SERPL-MCNC: 9.8 MG/DL — SIGNIFICANT CHANGE UP (ref 8.4–10.5)
CHLORIDE SERPL-SCNC: 99 MMOL/L — SIGNIFICANT CHANGE UP (ref 96–108)
CO2 SERPL-SCNC: 29 MMOL/L — SIGNIFICANT CHANGE UP (ref 22–31)
CREAT SERPL-MCNC: 0.83 MG/DL — SIGNIFICANT CHANGE UP (ref 0.5–1.3)
GLUCOSE SERPL-MCNC: 124 MG/DL — HIGH (ref 70–99)
HCT VFR BLD CALC: 29.3 % — LOW (ref 34.5–45)
HGB BLD-MCNC: 8.8 G/DL — LOW (ref 11.5–15.5)
INR BLD: 1.76 RATIO — HIGH (ref 0.88–1.16)
MCHC RBC-ENTMCNC: 26.4 PG — LOW (ref 27–34)
MCHC RBC-ENTMCNC: 30 GM/DL — LOW (ref 32–36)
MCV RBC AUTO: 88 FL — SIGNIFICANT CHANGE UP (ref 80–100)
PLATELET # BLD AUTO: 310 K/UL — SIGNIFICANT CHANGE UP (ref 150–400)
POTASSIUM SERPL-MCNC: 4.4 MMOL/L — SIGNIFICANT CHANGE UP (ref 3.5–5.3)
POTASSIUM SERPL-SCNC: 4.4 MMOL/L — SIGNIFICANT CHANGE UP (ref 3.5–5.3)
PROTHROM AB SERPL-ACNC: 20.1 SEC — HIGH (ref 10–13.1)
RBC # BLD: 3.33 M/UL — LOW (ref 3.8–5.2)
RBC # FLD: 17.4 % — HIGH (ref 10.3–14.5)
SODIUM SERPL-SCNC: 139 MMOL/L — SIGNIFICANT CHANGE UP (ref 135–145)
WBC # BLD: 11.5 K/UL — HIGH (ref 3.8–10.5)
WBC # FLD AUTO: 11.5 K/UL — HIGH (ref 3.8–10.5)

## 2018-07-12 PROCEDURE — 99232 SBSQ HOSP IP/OBS MODERATE 35: CPT

## 2018-07-12 RX ORDER — WARFARIN SODIUM 2.5 MG/1
3 TABLET ORAL ONCE
Qty: 0 | Refills: 0 | Status: COMPLETED | OUTPATIENT
Start: 2018-07-12 | End: 2018-07-12

## 2018-07-12 RX ORDER — HYDROMORPHONE HYDROCHLORIDE 2 MG/ML
0.5 INJECTION INTRAMUSCULAR; INTRAVENOUS; SUBCUTANEOUS ONCE
Qty: 0 | Refills: 0 | Status: DISCONTINUED | OUTPATIENT
Start: 2018-07-12 | End: 2018-07-12

## 2018-07-12 RX ORDER — WARFARIN SODIUM 2.5 MG/1
5 TABLET ORAL ONCE
Qty: 0 | Refills: 0 | Status: DISCONTINUED | OUTPATIENT
Start: 2018-07-12 | End: 2018-07-12

## 2018-07-12 RX ADMIN — HYDROMORPHONE HYDROCHLORIDE 4 MILLIGRAM(S): 2 INJECTION INTRAMUSCULAR; INTRAVENOUS; SUBCUTANEOUS at 08:32

## 2018-07-12 RX ADMIN — SIMVASTATIN 20 MILLIGRAM(S): 20 TABLET, FILM COATED ORAL at 21:36

## 2018-07-12 RX ADMIN — HYDROMORPHONE HYDROCHLORIDE 4 MILLIGRAM(S): 2 INJECTION INTRAMUSCULAR; INTRAVENOUS; SUBCUTANEOUS at 09:02

## 2018-07-12 RX ADMIN — Medication 3 MILLIGRAM(S): at 21:37

## 2018-07-12 RX ADMIN — MINOCYCLINE HYDROCHLORIDE 100 MILLIGRAM(S): 45 TABLET, EXTENDED RELEASE ORAL at 18:36

## 2018-07-12 RX ADMIN — Medication 1 TABLET(S): at 21:37

## 2018-07-12 RX ADMIN — BUDESONIDE AND FORMOTEROL FUMARATE DIHYDRATE 2 PUFF(S): 160; 4.5 AEROSOL RESPIRATORY (INHALATION) at 18:37

## 2018-07-12 RX ADMIN — HYDROMORPHONE HYDROCHLORIDE 4 MILLIGRAM(S): 2 INJECTION INTRAMUSCULAR; INTRAVENOUS; SUBCUTANEOUS at 03:56

## 2018-07-12 RX ADMIN — MINOCYCLINE HYDROCHLORIDE 100 MILLIGRAM(S): 45 TABLET, EXTENDED RELEASE ORAL at 06:07

## 2018-07-12 RX ADMIN — HYDROMORPHONE HYDROCHLORIDE 2 MILLIGRAM(S): 2 INJECTION INTRAMUSCULAR; INTRAVENOUS; SUBCUTANEOUS at 06:03

## 2018-07-12 RX ADMIN — Medication 1 APPLICATION(S): at 06:06

## 2018-07-12 RX ADMIN — Medication 1 MILLIGRAM(S): at 11:49

## 2018-07-12 RX ADMIN — Medication 1 APPLICATION(S): at 17:30

## 2018-07-12 RX ADMIN — BUDESONIDE AND FORMOTEROL FUMARATE DIHYDRATE 2 PUFF(S): 160; 4.5 AEROSOL RESPIRATORY (INHALATION) at 06:06

## 2018-07-12 RX ADMIN — Medication 1 APPLICATION(S): at 06:53

## 2018-07-12 RX ADMIN — Medication 1 TABLET(S): at 06:06

## 2018-07-12 RX ADMIN — Medication 12.5 MILLIGRAM(S): at 18:36

## 2018-07-12 RX ADMIN — Medication 1 TABLET(S): at 14:14

## 2018-07-12 RX ADMIN — GABAPENTIN 300 MILLIGRAM(S): 400 CAPSULE ORAL at 14:14

## 2018-07-12 RX ADMIN — HYDROMORPHONE HYDROCHLORIDE 2 MILLIGRAM(S): 2 INJECTION INTRAMUSCULAR; INTRAVENOUS; SUBCUTANEOUS at 23:29

## 2018-07-12 RX ADMIN — WARFARIN SODIUM 3 MILLIGRAM(S): 2.5 TABLET ORAL at 21:35

## 2018-07-12 RX ADMIN — HYDROMORPHONE HYDROCHLORIDE 2 MILLIGRAM(S): 2 INJECTION INTRAMUSCULAR; INTRAVENOUS; SUBCUTANEOUS at 21:51

## 2018-07-12 RX ADMIN — HYDROMORPHONE HYDROCHLORIDE 4 MILLIGRAM(S): 2 INJECTION INTRAMUSCULAR; INTRAVENOUS; SUBCUTANEOUS at 04:26

## 2018-07-12 RX ADMIN — Medication 1 TABLET(S): at 11:49

## 2018-07-12 RX ADMIN — TIOTROPIUM BROMIDE 1 CAPSULE(S): 18 CAPSULE ORAL; RESPIRATORY (INHALATION) at 11:55

## 2018-07-12 RX ADMIN — HYDROMORPHONE HYDROCHLORIDE 2 MILLIGRAM(S): 2 INJECTION INTRAMUSCULAR; INTRAVENOUS; SUBCUTANEOUS at 14:44

## 2018-07-12 RX ADMIN — HYDROMORPHONE HYDROCHLORIDE 4 MILLIGRAM(S): 2 INJECTION INTRAMUSCULAR; INTRAVENOUS; SUBCUTANEOUS at 12:04

## 2018-07-12 RX ADMIN — HYDROMORPHONE HYDROCHLORIDE 4 MILLIGRAM(S): 2 INJECTION INTRAMUSCULAR; INTRAVENOUS; SUBCUTANEOUS at 12:34

## 2018-07-12 RX ADMIN — Medication 81 MILLIGRAM(S): at 11:49

## 2018-07-12 RX ADMIN — HYDROMORPHONE HYDROCHLORIDE 2 MILLIGRAM(S): 2 INJECTION INTRAMUSCULAR; INTRAVENOUS; SUBCUTANEOUS at 07:09

## 2018-07-12 RX ADMIN — HYDROMORPHONE HYDROCHLORIDE 2 MILLIGRAM(S): 2 INJECTION INTRAMUSCULAR; INTRAVENOUS; SUBCUTANEOUS at 14:14

## 2018-07-12 RX ADMIN — GABAPENTIN 300 MILLIGRAM(S): 400 CAPSULE ORAL at 06:07

## 2018-07-12 RX ADMIN — PANTOPRAZOLE SODIUM 40 MILLIGRAM(S): 20 TABLET, DELAYED RELEASE ORAL at 06:07

## 2018-07-12 RX ADMIN — GABAPENTIN 300 MILLIGRAM(S): 400 CAPSULE ORAL at 21:36

## 2018-07-12 RX ADMIN — Medication 12.5 MILLIGRAM(S): at 06:06

## 2018-07-12 RX ADMIN — Medication 500 MILLIGRAM(S): at 11:49

## 2018-07-12 RX ADMIN — LORATADINE 10 MILLIGRAM(S): 10 TABLET ORAL at 11:49

## 2018-07-12 RX ADMIN — Medication 1 APPLICATION(S): at 17:29

## 2018-07-12 NOTE — PROGRESS NOTE ADULT - SUBJECTIVE AND OBJECTIVE BOX
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    Patient was recently admitted from 4/9/18 - 6/6/18. Patient was initially presented for right total knee spacer exchange and I&D with complex wound closure. Previously had a spacer exchanged performed on 3/9 and then was discharged to rehab. Patient then returned on 4/9 with fever and concern for sepsis, found to have positive cultures from knee for MRSA as well as multifocal pneumonia. During admission, patient completed course of Daptomycin for MRSA prosthesis infection and was also treated for multifocal PNA with ?aztreonam. Patient had extensive wound care, followed by Surgery/Plastics/Orthopedics/Id and was discharged to rehab on 6/6 with suppressive minocycline. Admission complicated by lesion over thigh and lower pannus requiring wound vacs.  UA On 6/5 positive, but no culture sent and no antibiotics started.      Patient presents from rehab with fevers and lethargy shortly after discharge. In the ED, Tmax 100.6, HR 85,  /62, O2 98% on 2L NC. Labs showed mild leukocytosis with WBC 13.8, stable anemia Hgb 9.6, and normal platelets 319. Metabolic panel overall unremarkable with Cr 0.89. LFTs unremarkable. VBG with lactate 1.3. UA again positive with >50 WBCs, few bacteria, turbid appearance, and large LE.     Patient had a CT abdomen/pelvis that showed evidence of possible distal appendicitis. Evaluated by Surgery and family declining operative intervention due to high risk. Plastics to aid with wound management, to apply wound vac again in AM to thigh and pannus. Patient not felt to have evidence of wound infection. (07 Jun 2018 22:19)    I saw the pt several times on the last admit. and she had a ferritin that was over 400 and was given at times procrit when the hemoglobin got into the 7 range. The pt has a inflammatory anemia and the hemoglobin now is about 8.6 and she is normocytic hypochromic with a high rdw. Will at this time follow the pt and I see no need now for an extensive workup for this anemia. Will support as needed. 7/12 met with pt and aid at bedside and explained approach to the anemia no need for procrit.      MEDICATIONS  (STANDING):  ALPRAZolam 0.25 milliGRAM(s) Oral once  ascorbic acid 500 milliGRAM(s) Oral daily  aspirin enteric coated 81 milliGRAM(s) Oral daily  betamethasone valerate 0.1% Ointment 1 Application(s) Topical two times a day  buDESOnide 160 MICROgram(s)/formoterol 4.5 MICROgram(s) Inhaler 2 Puff(s) Inhalation two times a day  calcium carbonate 1250 mG + Vitamin D (OsCal 500 + D) 1 Tablet(s) Oral three times a day  Dakins Solution - 1/4 Strength 1 Application(s) Topical two times a day  folic acid 1 milliGRAM(s) Oral daily  gabapentin 300 milliGRAM(s) Oral three times a day  HYDROmorphone  Injectable 0.5 milliGRAM(s) IV Push once  loratadine 10 milliGRAM(s) Oral daily  melatonin 3 milliGRAM(s) Oral at bedtime  metoprolol tartrate 12.5 milliGRAM(s) Oral two times a day  minocycline 100 milliGRAM(s) Oral two times a day  multivitamin 1 Tablet(s) Oral daily  pantoprazole    Tablet 40 milliGRAM(s) Oral before breakfast  polyethylene glycol 3350 17 Gram(s) Oral two times a day  simvastatin 20 milliGRAM(s) Oral at bedtime  tiotropium 18 MICROgram(s) Capsule 1 Capsule(s) Inhalation daily  warfarin 5 milliGRAM(s) Oral once    MEDICATIONS  (PRN):  acetaminophen   Tablet. 650 milliGRAM(s) Oral every 6 hours PRN Mild Pain (1 - 3)  bisacodyl Suppository 10 milliGRAM(s) Rectal daily PRN Constipation  bismuth subsalicylate Liquid 30 milliLiter(s) Oral every 6 hours PRN Cramping/abd pain  HYDROmorphone   Tablet 4 milliGRAM(s) Oral every 4 hours PRN Severe Pain (7 - 10)  HYDROmorphone   Tablet 2 milliGRAM(s) Oral every 3 hours PRN Moderate Pain (4 - 6)  magnesium hydroxide Suspension 30 milliLiter(s) Oral daily PRN Constipation  senna 2 Tablet(s) Oral at bedtime PRN Constipation  simethicone 80 milliGRAM(s) Chew two times a day PRN Gas  Vital Signs Last 24 Hrs  T(C): 36.6 (12 Jul 2018 08:57), Max: 37.1 (11 Jul 2018 21:04)  T(F): 97.8 (12 Jul 2018 08:57), Max: 98.7 (11 Jul 2018 21:04)  HR: 92 (12 Jul 2018 08:57) (88 - 96)  BP: 102/61 (12 Jul 2018 08:57) (99/63 - 119/86)  BP(mean): --  RR: 18 (12 Jul 2018 08:57) (18 - 18)  SpO2: 92% (12 Jul 2018 08:57) (92% - 98%)  LABS:                        8.8    11.50 )-----------( 310      ( 12 Jul 2018 08:00 )             29.3       07-11    141  |  99  |  30<H>  ----------------------------<  123<H>  4.3   |  29  |  0.81    Ca    9.6      11 Jul 2018 07:11      PT/INR - ( 11 Jul 2018 08:08 )   PT: 15.5 sec;   INR: 1.36 ratio         PTT - ( 11 Jul 2018 08:08 )  PTT:36.8 sec      ROS:  Negative except for:    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  Artificial cardiac pacemaker: 12/25/17  H/O cardiac catheterization: 11/29/16 HERBERT placed on LAD  H/O endoscopy: with dilatation of esophageal stricture 2013  S/P cataract surgery: x2; 3-4yr ago  S/P gastroplasty: 28 yrs ago  S/P cholecystectomy: more than 15 years ago  S/P total knee replacement: left, 15yr ago  S/P total knee replacement: right, 12yr ago  S/P hysterectomy: 24yr ago      SOCIAL HISTORY:    FAMILY HISTORY:  No pertinent family history in first degree relatives      Allergies    amoxicillin (Other)    Intolerances    IV Contrast (Flushing (Skin); Nausea)      PHYSICAL EXAM as per attending  General: adult in NAD  HEENT: clear oropharynx, anicteric sclera, pink conjunctivae  Neck: supple  CV: normal S1S2 with no murmur rubs or gallops  Lungs: clear to auscultation, no wheezes, no rhales  Abdomen: soft non-tender non-distended, no hepato/splenomegaly  Ext: no clubbing cyanosis or edema  Skin: no rashes and no petichiae  Neuro: alert and oriented X3 no focal deficits    BLOOD SMEAR INTERPRETATION:    RADIOLOGY :

## 2018-07-12 NOTE — PROGRESS NOTE ADULT - ASSESSMENT
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 Pt completed 10 days meropenem.     # abd wound, multiple bl thigh wounds  continue vac to abdomen, rt Knee, right lat thigh vac changes M/W/F  appreciate wound care recs  prior biopsies by derm were not consistent with pyoderma gangrenosum  6/22 left thigh and left arm punch biopsies resulted and consistent with urticaria. evaluated by allergy.  pt with new R thigh lesion, sp punch biopsy by derm 7/11, fu biopsy for concern of pyoderma gangrenosum  if this is PG, systemic steroids would be required. however, this would compromise wound healing.  tissue culture grew coag neg staph likely colonized    # Pain control  hospital does not currently have pain management specialty for consults  cont dilaudid 2q3 prn mod pain, 4mgq4-prn severe pain, will give IV dilaudid 0.5mg x 1 for breakthrough pain  gabapentin 300tid  bowel regimen    # polymyalgia rheumatica  steroids have been on hold to allow for wound healing  no signs of active rheum dz, evaluated by rheum 6/29    # TKR wound-mrsa  chronic minocycline suppression  appreciate ID fu    # chr Anemia  stable, monitor h/h closely  appreciate heme recs    # S/P TAVR (transcatheter aortic valve replacement)  continue Coumadin, daily INR    # adjustment disorder with depressed mood  xanax prn    dispo:   will await new R leg punch biopsy    plan of care dw patient at bedside 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 Pt completed 10 days meropenem.     # abd wound, multiple bl thigh wounds  continue vac to abdomen, rt Knee, right lat thigh vac   appreciate wound care recs  prior biopsies by derm were not consistent with pyoderma gangrenosum  6/22 left thigh and left arm punch biopsies resulted and consistent with urticaria. evaluated by allergy.  pt with new R thigh lesion, sp punch biopsy by derm 7/11, fu biopsy for concern of pyoderma gangrenosum  if this is PG, systemic steroids would be required. however, this would compromise wound healing.  tissue culture grew coag neg staph likely colonized    # Pain control  hospital does not currently have pain management specialty for consults  cont dilaudid 2q3 prn mod pain, 4mgq4-prn severe pain/before wound changes, will try to titrate down as tolerated  gabapentin 300tid  bowel regimen  dw Mr. Rdz who does not want IV dilaudid given to patient    # polymyalgia rheumatica  steroids have been on hold to allow for wound healing  no signs of active rheum dz, evaluated by rheum 6/29    # TKR wound-mrsa  chronic minocycline suppression  appreciate ID fu    # chr Anemia  stable, monitor h/h closely  appreciate heme recs    # S/P TAVR (transcatheter aortic valve replacement)  continue Coumadin, daily INR    # adjustment disorder with depressed mood  xanax prn    dispo:   will await new R leg punch biopsy    plan of care dw patient at bedside    I received a call from Mr Rdz and he is requesting we try to titrate down on the pain medications. I tried to explain that patient was in severe pain this morning on my examination and I had ordered IV dilaudid 0.5mg x 1 for break through pain. This was not given as 4mg PO dilaudid was given prior to wound change. Mr Rdz requested I treat his wife kindly. 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 Pt completed 10 days meropenem.     # abd wound, multiple bl thigh wounds  continue vac to abdomen, rt Knee, right lat thigh vac   appreciate wound care recs  prior biopsies by derm were not consistent with pyoderma gangrenosum  6/22 left thigh and left arm punch biopsies resulted and consistent with urticaria. evaluated by allergy.  pt with new R thigh lesion, sp punch biopsy by derm 7/11, fu biopsy for concern of pyoderma gangrenosum  if this is PG, systemic steroids would be required. however, this would compromise wound healing.  tissue culture grew coag neg staph likely colonized    # Pain control  hospital does not currently have pain management specialty for consults  cont dilaudid 2q3 prn mod pain, 4mgq4-prn severe pain/before wound changes, will try to titrate down as tolerated  gabapentin 300tid  bowel regimen  dw Mr. Rdz who does not want IV dilaudid given to patient    # polymyalgia rheumatica  steroids have been on hold to allow for wound healing  no signs of active rheum dz, evaluated by rheum 6/29    # TKR wound-mrsa  chronic minocycline suppression  appreciate ID fu    # chr Anemia  stable, monitor h/h closely  appreciate heme recs    # S/P TAVR (transcatheter aortic valve replacement)  continue Coumadin, daily INR    # adjustment disorder with depressed mood  xanax prn    dispo:   will await new R leg punch biopsy    plan of care dw patient at bedside    I received a call from Mr Rdz and he is requesting we try to titrate down on the pain medications. I tried to explain that patient was in severe pain this morning on my examination and I had ordered IV dilaudid 0.5mg x 1 for break through pain. This was not given as 4mg PO dilaudid was given prior to wound change. Mr Rdz requested I treat his wife kindly. 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

## 2018-07-12 NOTE — PROGRESS NOTE ADULT - SUBJECTIVE AND OBJECTIVE BOX
Patient is a 77y old  Female who presents with a chief complaint of fever, lethargy (07 Jun 2018 22:19)      SUBJECTIVE / OVERNIGHT EVENTS:    Patient seen and examined. co severe pain in right leg from wound vac. denies cp sob.      Vital Signs Last 24 Hrs  T(C): 36.6 (12 Jul 2018 08:57), Max: 37.1 (11 Jul 2018 21:04)  T(F): 97.8 (12 Jul 2018 08:57), Max: 98.7 (11 Jul 2018 21:04)  HR: 92 (12 Jul 2018 08:57) (88 - 96)  BP: 102/61 (12 Jul 2018 08:57) (99/63 - 119/86)  BP(mean): --  RR: 18 (12 Jul 2018 08:57) (18 - 18)  SpO2: 92% (12 Jul 2018 08:57) (92% - 98%)  I&O's Summary    11 Jul 2018 07:01  -  12 Jul 2018 07:00  --------------------------------------------------------  IN: 680 mL / OUT: 800 mL / NET: -120 mL        PE:  GENERAL: NAD, AAOx3, obese  HEAD:  Atraumatic, Normocephalic  EYES: EOMI, PERRLA, conjunctiva and sclera clear  NECK: Supple, No JVD  CHEST/LUNG: CTABL, No wheeze  HEART: Regular rate and rhythm; no murmur  ABDOMEN: Soft, Nontender, Nondistended; Bowel sounds present  EXTREMITIES:  2+ Peripheral Pulses, right medial thigh wound dressing, left medial thigh dressing, right knee vac, abd wound vac, wound vac right lateral thigh, right later thigh sp biopsy  SKIN: No rashes or lesions  NEURO: No focal deficits      LABS:                        8.8    11.50 )-----------( 310      ( 12 Jul 2018 08:00 )             29.3     07-12    139  |  99  |  30<H>  ----------------------------<  124<H>  4.4   |  29  |  0.83    Ca    9.8      12 Jul 2018 07:02      PT/INR - ( 12 Jul 2018 08:18 )   PT: 20.1 sec;   INR: 1.76 ratio         PTT - ( 12 Jul 2018 08:18 )  PTT:39.1 sec  CAPILLARY BLOOD GLUCOSE                RADIOLOGY & ADDITIONAL TESTS:    Imaging Personally Reviewed:  [x] YES  [ ] NO    Consultant(s) Notes Reviewed:  [x] YES  [ ] NO    MEDICATIONS  (STANDING):  ALPRAZolam 0.25 milliGRAM(s) Oral once  ascorbic acid 500 milliGRAM(s) Oral daily  aspirin enteric coated 81 milliGRAM(s) Oral daily  betamethasone valerate 0.1% Ointment 1 Application(s) Topical two times a day  buDESOnide 160 MICROgram(s)/formoterol 4.5 MICROgram(s) Inhaler 2 Puff(s) Inhalation two times a day  calcium carbonate 1250 mG + Vitamin D (OsCal 500 + D) 1 Tablet(s) Oral three times a day  Dakins Solution - 1/4 Strength 1 Application(s) Topical two times a day  folic acid 1 milliGRAM(s) Oral daily  gabapentin 300 milliGRAM(s) Oral three times a day  HYDROmorphone  Injectable 0.5 milliGRAM(s) IV Push once  loratadine 10 milliGRAM(s) Oral daily  melatonin 3 milliGRAM(s) Oral at bedtime  metoprolol tartrate 12.5 milliGRAM(s) Oral two times a day  minocycline 100 milliGRAM(s) Oral two times a day  multivitamin 1 Tablet(s) Oral daily  pantoprazole    Tablet 40 milliGRAM(s) Oral before breakfast  polyethylene glycol 3350 17 Gram(s) Oral two times a day  simvastatin 20 milliGRAM(s) Oral at bedtime  tiotropium 18 MICROgram(s) Capsule 1 Capsule(s) Inhalation daily    MEDICATIONS  (PRN):  acetaminophen   Tablet. 650 milliGRAM(s) Oral every 6 hours PRN Mild Pain (1 - 3)  bisacodyl Suppository 10 milliGRAM(s) Rectal daily PRN Constipation  bismuth subsalicylate Liquid 30 milliLiter(s) Oral every 6 hours PRN Cramping/abd pain  HYDROmorphone   Tablet 4 milliGRAM(s) Oral every 4 hours PRN Severe Pain (7 - 10)  HYDROmorphone   Tablet 2 milliGRAM(s) Oral every 3 hours PRN Moderate Pain (4 - 6)  magnesium hydroxide Suspension 30 milliLiter(s) Oral daily PRN Constipation  senna 2 Tablet(s) Oral at bedtime PRN Constipation  simethicone 80 milliGRAM(s) Chew two times a day PRN Gas      Care Discussed with Consultants/Other Providers [x] YES  [ ] NO    HEALTH ISSUES - PROBLEM Dx:  Urticaria: Urticaria  Joint infection: Joint infection  Other problems related to medical facilities and other health care: Other problems related to medical facilities and other health care  S/P TAVR (transcatheter aortic valve replacement): S/P TAVR (transcatheter aortic valve replacement)  Acute cystitis without hematuria: Acute cystitis without hematuria  Wound infection: Wound infection  Acute appendicitis, unspecified acute appendicitis type: Acute appendicitis, unspecified acute appendicitis type  Fever, unspecified fever cause: Fever, unspecified fever cause

## 2018-07-12 NOTE — PROGRESS NOTE ADULT - ASSESSMENT
no plan for orthopedic surgical intervention per family and patient preference  vac and wound care per PRS and wound care teams  PO abx per ID team, minocycline  encourage patient to spend majority of bed oob in bedside chair as able, has not been oob recently, PLEASE HAVE PT COME BY TO MOBILIZE PATIENT, she must remain 50% wb on the RLE and NO KNEE MOTION allowed, knee immobilizer if oob  coumadin, inr goal 2-3  continue to optimize nutrition  agree with pain team consult (pending), needs to switch to PO regimen  continue to involve psych given depression symptoms  orthopedic issues are stable for discharge to rehab, please make sure patient and family is okay with discharge before initiating planning process  concerned over slow rise in wbc despite afebrile, recommend infection work up

## 2018-07-12 NOTE — PROGRESS NOTE ADULT - SUBJECTIVE AND OBJECTIVE BOX
pain controlled  still not oob  has pain in lateral thigh today over wound vac    afvss  nad  abdominal wound - vac  RLE  thigh - dressing per wound care teams, lateral wound wet to dry per prs/wc teams  knee wound with vac  remainder of wounds dressed by wound care  5/5 ta/ehl/gcs  silt l4-s1  2+ dp pulse    ROS: depressed

## 2018-07-13 LAB
ANION GAP SERPL CALC-SCNC: 12 MMOL/L — SIGNIFICANT CHANGE UP (ref 5–17)
BUN SERPL-MCNC: 27 MG/DL — HIGH (ref 7–23)
CALCIUM SERPL-MCNC: 10 MG/DL — SIGNIFICANT CHANGE UP (ref 8.4–10.5)
CHLORIDE SERPL-SCNC: 98 MMOL/L — SIGNIFICANT CHANGE UP (ref 96–108)
CO2 SERPL-SCNC: 29 MMOL/L — SIGNIFICANT CHANGE UP (ref 22–31)
CREAT SERPL-MCNC: 0.87 MG/DL — SIGNIFICANT CHANGE UP (ref 0.5–1.3)
GLUCOSE SERPL-MCNC: 124 MG/DL — HIGH (ref 70–99)
HCT VFR BLD CALC: 28.7 % — LOW (ref 34.5–45)
HGB BLD-MCNC: 8.9 G/DL — LOW (ref 11.5–15.5)
INR BLD: 2.63 RATIO — HIGH (ref 0.88–1.16)
MCHC RBC-ENTMCNC: 27.1 PG — SIGNIFICANT CHANGE UP (ref 27–34)
MCHC RBC-ENTMCNC: 31 GM/DL — LOW (ref 32–36)
MCV RBC AUTO: 87.5 FL — SIGNIFICANT CHANGE UP (ref 80–100)
PLATELET # BLD AUTO: 291 K/UL — SIGNIFICANT CHANGE UP (ref 150–400)
POTASSIUM SERPL-MCNC: 4.3 MMOL/L — SIGNIFICANT CHANGE UP (ref 3.5–5.3)
POTASSIUM SERPL-SCNC: 4.3 MMOL/L — SIGNIFICANT CHANGE UP (ref 3.5–5.3)
PROTHROM AB SERPL-ACNC: 30.3 SEC — HIGH (ref 10–13.1)
RBC # BLD: 3.28 M/UL — LOW (ref 3.8–5.2)
RBC # FLD: 17.4 % — HIGH (ref 10.3–14.5)
SODIUM SERPL-SCNC: 139 MMOL/L — SIGNIFICANT CHANGE UP (ref 135–145)
WBC # BLD: 10.36 K/UL — SIGNIFICANT CHANGE UP (ref 3.8–10.5)
WBC # FLD AUTO: 10.36 K/UL — SIGNIFICANT CHANGE UP (ref 3.8–10.5)

## 2018-07-13 PROCEDURE — 71045 X-RAY EXAM CHEST 1 VIEW: CPT | Mod: 26

## 2018-07-13 PROCEDURE — 99232 SBSQ HOSP IP/OBS MODERATE 35: CPT

## 2018-07-13 PROCEDURE — 97606 NEG PRS WND THER DME>50 SQCM: CPT

## 2018-07-13 RX ORDER — WARFARIN SODIUM 2.5 MG/1
1 TABLET ORAL ONCE
Qty: 0 | Refills: 0 | Status: COMPLETED | OUTPATIENT
Start: 2018-07-13 | End: 2018-07-13

## 2018-07-13 RX ORDER — HYDROMORPHONE HYDROCHLORIDE 2 MG/ML
3 INJECTION INTRAMUSCULAR; INTRAVENOUS; SUBCUTANEOUS EVERY 4 HOURS
Qty: 0 | Refills: 0 | Status: DISCONTINUED | OUTPATIENT
Start: 2018-07-13 | End: 2018-07-13

## 2018-07-13 RX ORDER — HYDROMORPHONE HYDROCHLORIDE 2 MG/ML
2 INJECTION INTRAMUSCULAR; INTRAVENOUS; SUBCUTANEOUS EVERY 4 HOURS
Qty: 0 | Refills: 0 | Status: DISCONTINUED | OUTPATIENT
Start: 2018-07-13 | End: 2018-07-13

## 2018-07-13 RX ORDER — HYDROMORPHONE HYDROCHLORIDE 2 MG/ML
2 INJECTION INTRAMUSCULAR; INTRAVENOUS; SUBCUTANEOUS EVERY 4 HOURS
Qty: 0 | Refills: 0 | Status: DISCONTINUED | OUTPATIENT
Start: 2018-07-13 | End: 2018-07-20

## 2018-07-13 RX ORDER — HYDROMORPHONE HYDROCHLORIDE 2 MG/ML
1 INJECTION INTRAMUSCULAR; INTRAVENOUS; SUBCUTANEOUS
Qty: 0 | Refills: 0 | Status: DISCONTINUED | OUTPATIENT
Start: 2018-07-13 | End: 2018-07-13

## 2018-07-13 RX ORDER — HYDROMORPHONE HYDROCHLORIDE 2 MG/ML
3 INJECTION INTRAMUSCULAR; INTRAVENOUS; SUBCUTANEOUS EVERY 4 HOURS
Qty: 0 | Refills: 0 | Status: DISCONTINUED | OUTPATIENT
Start: 2018-07-13 | End: 2018-07-14

## 2018-07-13 RX ADMIN — WARFARIN SODIUM 1 MILLIGRAM(S): 2.5 TABLET ORAL at 21:59

## 2018-07-13 RX ADMIN — BUDESONIDE AND FORMOTEROL FUMARATE DIHYDRATE 2 PUFF(S): 160; 4.5 AEROSOL RESPIRATORY (INHALATION) at 18:39

## 2018-07-13 RX ADMIN — Medication 1 APPLICATION(S): at 06:01

## 2018-07-13 RX ADMIN — Medication 500 MILLIGRAM(S): at 11:53

## 2018-07-13 RX ADMIN — HYDROMORPHONE HYDROCHLORIDE 4 MILLIGRAM(S): 2 INJECTION INTRAMUSCULAR; INTRAVENOUS; SUBCUTANEOUS at 06:24

## 2018-07-13 RX ADMIN — PANTOPRAZOLE SODIUM 40 MILLIGRAM(S): 20 TABLET, DELAYED RELEASE ORAL at 06:00

## 2018-07-13 RX ADMIN — MINOCYCLINE HYDROCHLORIDE 100 MILLIGRAM(S): 45 TABLET, EXTENDED RELEASE ORAL at 05:56

## 2018-07-13 RX ADMIN — MINOCYCLINE HYDROCHLORIDE 100 MILLIGRAM(S): 45 TABLET, EXTENDED RELEASE ORAL at 18:37

## 2018-07-13 RX ADMIN — Medication 1 APPLICATION(S): at 05:59

## 2018-07-13 RX ADMIN — Medication 1 APPLICATION(S): at 18:27

## 2018-07-13 RX ADMIN — LORATADINE 10 MILLIGRAM(S): 10 TABLET ORAL at 11:53

## 2018-07-13 RX ADMIN — HYDROMORPHONE HYDROCHLORIDE 4 MILLIGRAM(S): 2 INJECTION INTRAMUSCULAR; INTRAVENOUS; SUBCUTANEOUS at 09:46

## 2018-07-13 RX ADMIN — Medication 81 MILLIGRAM(S): at 11:53

## 2018-07-13 RX ADMIN — Medication 1 TABLET(S): at 05:57

## 2018-07-13 RX ADMIN — HYDROMORPHONE HYDROCHLORIDE 4 MILLIGRAM(S): 2 INJECTION INTRAMUSCULAR; INTRAVENOUS; SUBCUTANEOUS at 05:54

## 2018-07-13 RX ADMIN — GABAPENTIN 300 MILLIGRAM(S): 400 CAPSULE ORAL at 22:00

## 2018-07-13 RX ADMIN — Medication 1 TABLET(S): at 22:00

## 2018-07-13 RX ADMIN — POLYETHYLENE GLYCOL 3350 17 GRAM(S): 17 POWDER, FOR SOLUTION ORAL at 05:57

## 2018-07-13 RX ADMIN — Medication 650 MILLIGRAM(S): at 22:50

## 2018-07-13 RX ADMIN — Medication 12.5 MILLIGRAM(S): at 18:37

## 2018-07-13 RX ADMIN — Medication 1 TABLET(S): at 11:53

## 2018-07-13 RX ADMIN — Medication 1 TABLET(S): at 14:28

## 2018-07-13 RX ADMIN — Medication 650 MILLIGRAM(S): at 22:11

## 2018-07-13 RX ADMIN — BUDESONIDE AND FORMOTEROL FUMARATE DIHYDRATE 2 PUFF(S): 160; 4.5 AEROSOL RESPIRATORY (INHALATION) at 06:00

## 2018-07-13 RX ADMIN — Medication 1 MILLIGRAM(S): at 11:53

## 2018-07-13 RX ADMIN — GABAPENTIN 300 MILLIGRAM(S): 400 CAPSULE ORAL at 14:28

## 2018-07-13 RX ADMIN — POLYETHYLENE GLYCOL 3350 17 GRAM(S): 17 POWDER, FOR SOLUTION ORAL at 18:39

## 2018-07-13 RX ADMIN — Medication 3 MILLIGRAM(S): at 21:59

## 2018-07-13 RX ADMIN — HYDROMORPHONE HYDROCHLORIDE 4 MILLIGRAM(S): 2 INJECTION INTRAMUSCULAR; INTRAVENOUS; SUBCUTANEOUS at 10:16

## 2018-07-13 RX ADMIN — SIMVASTATIN 20 MILLIGRAM(S): 20 TABLET, FILM COATED ORAL at 21:59

## 2018-07-13 RX ADMIN — Medication 12.5 MILLIGRAM(S): at 05:59

## 2018-07-13 RX ADMIN — GABAPENTIN 300 MILLIGRAM(S): 400 CAPSULE ORAL at 05:58

## 2018-07-13 RX ADMIN — TIOTROPIUM BROMIDE 1 CAPSULE(S): 18 CAPSULE ORAL; RESPIRATORY (INHALATION) at 12:01

## 2018-07-13 NOTE — CHART NOTE - NSCHARTNOTEFT_GEN_A_CORE
Pt admitted c acute appendicitis (completed antibiotics), abdominal wound and multiple luann. leg wounds, noted wound VAC continues to be in place, noted Dermatology following. Interim events noted, S/P punch bx 7/11 concern for pyoderma gangrenosum     Source: Patient [ x]    Family [ ]     other [x ] Aide Lissa at bedside    Diet : Regular diet with nepro 2 x daily, james 2 packets daily       Per aide pt with no N+V, last BM 2 days ago     PO intake:  Per aide pt was able to complete 50% of her grilled chicken caesar salad today and noted to be taking 25-75% of meals documented per flowsheets. Pt will take 2 nepro daily and usually 1 packet of james. Pt enjoys cheese and will snack on babybel cheese brought in from home as well as cheese provided by hospital, pt will take almonds intermittently and aide will provide peanut butter on fruit for a snack at times as well.       Current Weight: 215.3 lbs (6/27), 214.2 lbs (7/4), 222.4 lbs (7/11) wt increased again, continue to monitor, likely related to bed scale error vs fluid shifts, pt noted with non-pitting generalized edema  % Weight Change    Pertinent Medications: MEDICATIONS  (STANDING):  ALPRAZolam 0.25 milliGRAM(s) Oral once  ascorbic acid 500 milliGRAM(s) Oral daily  aspirin enteric coated 81 milliGRAM(s) Oral daily  betamethasone valerate 0.1% Ointment 1 Application(s) Topical two times a day  buDESOnide 160 MICROgram(s)/formoterol 4.5 MICROgram(s) Inhaler 2 Puff(s) Inhalation two times a day  calcium carbonate 1250 mG + Vitamin D (OsCal 500 + D) 1 Tablet(s) Oral three times a day  Dakins Solution - 1/4 Strength 1 Application(s) Topical two times a day  folic acid 1 milliGRAM(s) Oral daily  gabapentin 300 milliGRAM(s) Oral three times a day  loratadine 10 milliGRAM(s) Oral daily  melatonin 3 milliGRAM(s) Oral at bedtime  metoprolol tartrate 12.5 milliGRAM(s) Oral two times a day  minocycline 100 milliGRAM(s) Oral two times a day  multivitamin 1 Tablet(s) Oral daily  pantoprazole    Tablet 40 milliGRAM(s) Oral before breakfast  polyethylene glycol 3350 17 Gram(s) Oral two times a day  simvastatin 20 milliGRAM(s) Oral at bedtime  tiotropium 18 MICROgram(s) Capsule 1 Capsule(s) Inhalation daily  warfarin 1 milliGRAM(s) Oral once    MEDICATIONS  (PRN):  acetaminophen   Tablet. 650 milliGRAM(s) Oral every 6 hours PRN Mild Pain (1 - 3)  bisacodyl Suppository 10 milliGRAM(s) Rectal daily PRN Constipation  bismuth subsalicylate Liquid 30 milliLiter(s) Oral every 6 hours PRN Cramping/abd pain  HYDROmorphone   Tablet 4 milliGRAM(s) Oral every 4 hours PRN Severe Pain (7 - 10)  HYDROmorphone   Tablet 2 milliGRAM(s) Oral every 3 hours PRN Moderate Pain (4 - 6)  magnesium hydroxide Suspension 30 milliLiter(s) Oral daily PRN Constipation  senna 2 Tablet(s) Oral at bedtime PRN Constipation  simethicone 80 milliGRAM(s) Chew two times a day PRN Gas    Pertinent Labs:  07-13 Na139 mmol/L Glu 124 mg/dL<H> K+ 4.3 mmol/L Cr  0.87 mg/dL BUN 27 mg/dL<H>      Skin: free of pressure ulcer, wounds noted    Estimated Needs:   [x ] no change since previous assessment  [ ] recalculated:       Previous Nutrition Diagnosis:      [x ] Malnutrition (moderate)         Nutrition Diagnosis is [ x] ongoing  [ ] resolved [ ] not applicable          New Nutrition Diagnosis: [x ] not applicable         Interventions:     Recommend    1. Continue regular diet with nepro 2 x daily as ordered, consider decreasing james to 1 packet daily per pt request, pt with several at bedside at this time.  2. Continue to encourage po intake and obtain/honor food preferences as able        Monitoring and Evaluation:     [x ] PO intake [x ] Tolerance to diet prescription [ x] weights [x ] follow up per protocol    [ ] other:

## 2018-07-13 NOTE — PROGRESS NOTE ADULT - SUBJECTIVE AND OBJECTIVE BOX
SUBJECTIVE: Pt seen, chart reviewed.  had s/w  2 days ago regarding his questions about use of BFI powder  Among other matters , this was discussed today with son, Thony, and daughter, Betty  present during VAC change  D/W RIVERA CHINCHILLA regarding addition of steroids to therapeutic regimen- no objection raised         Allergies    amoxicillin (Other)    Intolerances    IV Contrast (Flushing (Skin); Nausea)      aspirin enteric coated 81 milliGRAM(s) Oral daily  minocycline 100 milliGRAM(s) Oral two times a day  warfarin 1 milliGRAM(s) Oral once      	    Physical Exam:  Vital Signs Last 24 Hrs  T(C): 36.4 (13 Jul 2018 09:02), Max: 37.1 (12 Jul 2018 17:38)  T(F): 97.6 (13 Jul 2018 09:02), Max: 98.7 (12 Jul 2018 17:38)  HR: 75 (13 Jul 2018 09:02) (75 - 86)  BP: 109/71 (13 Jul 2018 09:02) (109/71 - 115/78)  BP(mean): --  RR: 18 (13 Jul 2018 09:02) (18 - 18)  SpO2: 92% (13 Jul 2018 09:02) (92% - 98%)    Wounds of right knee, right lateral thigh , right anterior thigh, and medial left thigh inspected  Patient tolerance of dressing changes is minimal  Wounds of right knee are significantly improved  No additional debridement required of wounds inspected  status d/w family at bedside  they are opposed to consider discharge        LABS:                        8.9    10.36 )-----------( 291      ( 13 Jul 2018 09:30 )             28.7     PT/INR - ( 13 Jul 2018 08:02 )   PT: 30.3 sec;   INR: 2.63 ratio         PTT - ( 12 Jul 2018 08:18 )  PTT:39.1 sec    remain available

## 2018-07-13 NOTE — PROGRESS NOTE ADULT - ASSESSMENT
7/12 met with pt and aid at bedside and explained approach to the anemia no need for procrit.  7/13 pt hemoglobin 8.9 and stable from heme point of view might be treated for pyoderma gangrenosum with steroids and biopsy is pending.

## 2018-07-13 NOTE — CHART NOTE - NSCHARTNOTEFT_GEN_A_CORE
Final biopsy results are pending. Previous biopsies and clinical presentation are suspicious, but not classic for pyoderma gangrensoum. Given this a therapeutic trial of intra-lesional kenalog was done to a new lesion on the R thigh. If biopsy results are consistent with pyoderma gangrenosum, can consider systemic therapies, such as infliximab infusion. Will obtain permission from family to present patient at grand rounds.

## 2018-07-13 NOTE — PROGRESS NOTE ADULT - SUBJECTIVE AND OBJECTIVE BOX
Plastic Surgery Progress Note (pg [facility]: [id number], [facility]: [phone])    SUBJECTIVE:  The patient was seen and examined. No acute events overnight.    OBJECTIVE:     ** VITAL SIGNS / I&O's **    Vital Signs Last 24 Hrs  T(C): 36.4 (13 Jul 2018 09:02), Max: 37.1 (12 Jul 2018 17:38)  T(F): 97.6 (13 Jul 2018 09:02), Max: 98.7 (12 Jul 2018 17:38)  HR: 75 (13 Jul 2018 09:02) (75 - 86)  BP: 109/71 (13 Jul 2018 09:02) (109/71 - 115/78)  BP(mean): --  RR: 18 (13 Jul 2018 09:02) (18 - 18)  SpO2: 92% (13 Jul 2018 09:02) (92% - 98%)      12 Jul 2018 07:01  -  13 Jul 2018 07:00  --------------------------------------------------------  IN:    Oral Fluid: 680 mL  Total IN: 680 mL    OUT:  Total OUT: 0 mL    Total NET: 680 mL      13 Jul 2018 07:01  -  13 Jul 2018 11:07  --------------------------------------------------------  IN:    Oral Fluid: 200 mL  Total IN: 200 mL    OUT:    Voided: 100 mL  Total OUT: 100 mL    Total NET: 100 mL          ** PHYSICAL EXAM **    -- CONSTITUTIONAL: Alert, NAD   -- ABDOMEN: central abd VAC intact and set to suction.   -- EXTREMITIES: irrigation vac placed on wound of R lateral thigh, R knee vac is off irrigation, both vacs are holding suction. R medial thigh- wet to dry dressing applied.      ** LABS **                          8.9    10.36 )-----------( 291      ( 13 Jul 2018 09:30 )             28.7     13 Jul 2018 07:17    139    |  98     |  27     ----------------------------<  124    4.3     |  29     |  0.87     Ca    10.0       13 Jul 2018 07:17      PT/INR - ( 13 Jul 2018 08:02 )   PT: 30.3 sec;   INR: 2.63 ratio         PTT - ( 12 Jul 2018 08:18 )  PTT:39.1 sec  CAPILLARY BLOOD GLUCOSE                    ** MEDICATIONS **  MEDICATIONS  (STANDING):  ALPRAZolam 0.25 milliGRAM(s) Oral once  ascorbic acid 500 milliGRAM(s) Oral daily  aspirin enteric coated 81 milliGRAM(s) Oral daily  betamethasone valerate 0.1% Ointment 1 Application(s) Topical two times a day  buDESOnide 160 MICROgram(s)/formoterol 4.5 MICROgram(s) Inhaler 2 Puff(s) Inhalation two times a day  calcium carbonate 1250 mG + Vitamin D (OsCal 500 + D) 1 Tablet(s) Oral three times a day  Dakins Solution - 1/4 Strength 1 Application(s) Topical two times a day  folic acid 1 milliGRAM(s) Oral daily  gabapentin 300 milliGRAM(s) Oral three times a day  loratadine 10 milliGRAM(s) Oral daily  melatonin 3 milliGRAM(s) Oral at bedtime  metoprolol tartrate 12.5 milliGRAM(s) Oral two times a day  minocycline 100 milliGRAM(s) Oral two times a day  multivitamin 1 Tablet(s) Oral daily  pantoprazole    Tablet 40 milliGRAM(s) Oral before breakfast  polyethylene glycol 3350 17 Gram(s) Oral two times a day  simvastatin 20 milliGRAM(s) Oral at bedtime  tiotropium 18 MICROgram(s) Capsule 1 Capsule(s) Inhalation daily  warfarin 1 milliGRAM(s) Oral once    MEDICATIONS  (PRN):  acetaminophen   Tablet. 650 milliGRAM(s) Oral every 6 hours PRN Mild Pain (1 - 3)  bisacodyl Suppository 10 milliGRAM(s) Rectal daily PRN Constipation  bismuth subsalicylate Liquid 30 milliLiter(s) Oral every 6 hours PRN Cramping/abd pain  HYDROmorphone   Tablet 4 milliGRAM(s) Oral every 4 hours PRN Severe Pain (7 - 10)  HYDROmorphone   Tablet 2 milliGRAM(s) Oral every 3 hours PRN Moderate Pain (4 - 6)  magnesium hydroxide Suspension 30 milliLiter(s) Oral daily PRN Constipation  senna 2 Tablet(s) Oral at bedtime PRN Constipation  simethicone 80 milliGRAM(s) Chew two times a day PRN Gas

## 2018-07-13 NOTE — PROGRESS NOTE ADULT - ASSESSMENT
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 Pt completed 10 days meropenem.     # abd wound, multiple bl thigh wounds  pt with new R thigh lesion, sp punch biopsy by derm 7/11, fu biopsy for concern of pyoderma gangrenosum  if this is PG, systemic steroids would be tx. however, this would be a difficult situation as steroids will compromise wound healing.  continue vac to abdomen, rt Knee, right lat thigh vac   appreciate wound care recs  prior biopsies by derm were not consistent with pyoderma gangrenosum  6/22 left thigh and left arm punch biopsies resulted and consistent with urticaria. evaluated by allergy.  tissue culture grew coag neg staph likely colonized    # leukocytosis  pt without localizing symptoms      # Pain control  we have not been able to secure pain management consult  dw Mr. Rdz who does not want IV dilaudid given to patient  cont dilaudid 2q3 prn mod pain, 4mgq4-prn severe pain/before wound changes, currently unable to titrate down as patient with severe pain upon turning and changing  gabapentin 300tid  bowel regimen    # polymyalgia rheumatica  steroids have been on hold to allow for wound healing  no signs of active rheum dz, evaluated by rheum 6/29    # TKR wound-mrsa  chronic minocycline suppression  appreciate ID fu    # chr Anemia  stable, monitor h/h closely  appreciate heme recs    # S/P TAVR (transcatheter aortic valve replacement)  continue Coumadin, daily INR    # adjustment disorder with depressed mood  xanax prn    dispo:   will await new R leg punch biopsy    plan of care dw patient and aid at bedside  yasmin NP 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 Pt completed 10 days meropenem.     # abd wound, multiple bl thigh wounds  pt with new R thigh lesion, sp punch biopsy by derm 7/11, fu biopsy for concern of pyoderma gangrenosum  if this is PG, systemic steroids would be tx. however, this would be a difficult situation as steroids will compromise wound healing.  continue vac to abdomen, rt Knee, right lat thigh vac   appreciate wound care recs  prior biopsies by derm were not consistent with pyoderma gangrenosum  6/22 left thigh and left arm punch biopsies resulted and consistent with urticaria. evaluated by allergy.  tissue culture grew coag neg staph likely colonized    # leukocytosis  WBC increased minimally past 2 days but has decreased today  pt without localizing symptoms, afebrile  no indication for abx, will hold off further ryder for infection    # Pain control  we have not been able to secure pain management consult  dw Mr. Rdz who does not want IV dilaudid given to patient  cont dilaudid 2q3 prn mod pain, 4mgq4-prn severe pain/before wound changes, currently unable to titrate down as patient with severe pain upon turning and changing  gabapentin 300tid  bowel regimen    # polymyalgia rheumatica  steroids have been on hold to allow for wound healing  no signs of active rheum dz, evaluated by rheum 6/29    # TKR wound-mrsa  chronic minocycline suppression  appreciate ID fu    # chr Anemia  stable, monitor h/h closely  appreciate heme recs    # S/P TAVR (transcatheter aortic valve replacement)  continue Coumadin, daily INR    # adjustment disorder with depressed mood  xanax prn    dispo:   will await new R leg punch biopsy    plan of care dw patient and aid at bedside  yasmin NP 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 Pt completed 10 days meropenem.     # abd wound, multiple bl thigh wounds  pt with new R thigh lesion, sp punch biopsy by derm 7/11, fu biopsy for concern of pyoderma gangrenosum  if this is PG, systemic steroids would be tx. however, this would be a difficult situation as steroids will compromise wound healing.  continue vac to abdomen, rt Knee, right lat thigh vac   appreciate wound care recs  prior biopsies by derm were not consistent with pyoderma gangrenosum  6/22 left thigh and left arm punch biopsies resulted and consistent with urticaria. evaluated by allergy.  tissue culture grew coag neg staph likely colonized    # leukocytosis  WBC increased minimally past 2 days but has decreased today  pt without localizing symptoms, afebrile  no indication for abx, will hold off further ryder for infection    # Pain control  we have not been able to secure pain management consult  dw Mr. Rdz who does not want IV dilaudid given to patient  after discussion with patient's son Thony, we will decrease Dilaudid to 1mg q4 prn for moderate pain and 3mg q4 pRN for severe pain, 3mg will be titrated to 2mg q4 PRN in 1-2 days  try to avoid IV dilaudid  can try IV tylenol for pain if uncontrolled  gabapentin 300tid  bowel regimen    # polymyalgia rheumatica  steroids have been on hold to allow for wound healing  no signs of active rheum dz, evaluated by rheum 6/29    # TKR wound-mrsa  chronic minocycline suppression  appreciate ID fu    # chr Anemia  stable, monitor h/h closely  appreciate heme recs    # S/P TAVR (transcatheter aortic valve replacement)  continue Coumadin, daily INR    # adjustment disorder with depressed mood  xanax prn    dispo:   will await new R leg punch biopsy    plan of care dw patient and aid at bedside  dw NP  dw patient's son Olga over the phone 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 Pt completed 10 days meropenem.     # abd wound, multiple bl thigh wounds  pt with new R thigh lesion, sp punch biopsy by derm 7/11, fu biopsy for concern of pyoderma gangrenosum  if this is PG, systemic steroids would be tx. however, this would be a difficult situation as steroids will compromise wound healing.  continue vac to abdomen, rt Knee, right lat thigh vac   appreciate wound care recs  prior biopsies by derm were not consistent with pyoderma gangrenosum  6/22 left thigh and left arm punch biopsies resulted and consistent with urticaria. evaluated by allergy.  tissue culture grew coag neg staph likely colonized    # leukocytosis  WBC increased minimally past 2 days but has decreased today  pt without localizing symptoms, afebrile  no indication for abx, will hold off further ryder for infection    # Pain control  we have not been able to secure pain management consult  dw Mr. Rdz who does not want IV dilaudid given to patient  after discussion with patient's son Thony, we will decrease Dilaudid to 1mg q4 prn for moderate pain and 3mg q4 pRN for severe pain, 3mg will be titrated to 2mg q4 PRN in 1-2 days  try to avoid IV dilaudid, but okay to give 0.5mg IV before wound changes if necessary  can try IV tylenol for pain if uncontrolled  gabapentin 300mg tid  bowel regimen    # polymyalgia rheumatica  steroids have been on hold to allow for wound healing  no signs of active rheum dz, evaluated by rheum 6/29    # TKR wound-mrsa  chronic minocycline suppression  appreciate ID fu    # chr Anemia  stable, monitor h/h closely  appreciate heme recs    # S/P TAVR (transcatheter aortic valve replacement)  continue Coumadin, daily INR    # adjustment disorder with depressed mood  xanax prn    dispo:   will await new R leg punch biopsy    plan of care dw patient and aid at bedside  yasmin NP  yasmin patient's son Olga over the phone

## 2018-07-13 NOTE — PROGRESS NOTE ADULT - SUBJECTIVE AND OBJECTIVE BOX
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    Patient was recently admitted from 4/9/18 - 6/6/18. Patient was initially presented for right total knee spacer exchange and I&D with complex wound closure. Previously had a spacer exchanged performed on 3/9 and then was discharged to rehab. Patient then returned on 4/9 with fever and concern for sepsis, found to have positive cultures from knee for MRSA as well as multifocal pneumonia. During admission, patient completed course of Daptomycin for MRSA prosthesis infection and was also treated for multifocal PNA with ?aztreonam. Patient had extensive wound care, followed by Surgery/Plastics/Orthopedics/Id and was discharged to rehab on 6/6 with suppressive minocycline. Admission complicated by lesion over thigh and lower pannus requiring wound vacs.  UA On 6/5 positive, but no culture sent and no antibiotics started.      Patient presents from rehab with fevers and lethargy shortly after discharge. In the ED, Tmax 100.6, HR 85,  /62, O2 98% on 2L NC. Labs showed mild leukocytosis with WBC 13.8, stable anemia Hgb 9.6, and normal platelets 319. Metabolic panel overall unremarkable with Cr 0.89. LFTs unremarkable. VBG with lactate 1.3. UA again positive with >50 WBCs, few bacteria, turbid appearance, and large LE.     Patient had a CT abdomen/pelvis that showed evidence of possible distal appendicitis. Evaluated by Surgery and family declining operative intervention due to high risk. Plastics to aid with wound management, to apply wound vac again in AM to thigh and pannus. Patient not felt to have evidence of wound infection. (07 Jun 2018 22:19)    I saw the pt several times on the last admit. and she had a ferritin that was over 400 and was given at times procrit when the hemoglobin got into the 7 range. The pt has a inflammatory anemia and the hemoglobin now is about 8.6 and she is normocytic hypochromic with a high rdw. Will at this time follow the pt and I see no need now for an extensive workup for this anemia. Will support as needed. 7/12 met with pt and aid at bedside and explained approach to the anemia no need for procrit.  7/13 pt hemoglobin 8.9 and stable from heme point of view might be treated for pyoderma gangrenosum with steroids and biopsy is pending.  MEDICATIONS  (STANDING):  ALPRAZolam 0.25 milliGRAM(s) Oral once  ascorbic acid 500 milliGRAM(s) Oral daily  aspirin enteric coated 81 milliGRAM(s) Oral daily  betamethasone valerate 0.1% Ointment 1 Application(s) Topical two times a day  buDESOnide 160 MICROgram(s)/formoterol 4.5 MICROgram(s) Inhaler 2 Puff(s) Inhalation two times a day  calcium carbonate 1250 mG + Vitamin D (OsCal 500 + D) 1 Tablet(s) Oral three times a day  Dakins Solution - 1/4 Strength 1 Application(s) Topical two times a day  folic acid 1 milliGRAM(s) Oral daily  gabapentin 300 milliGRAM(s) Oral three times a day  loratadine 10 milliGRAM(s) Oral daily  melatonin 3 milliGRAM(s) Oral at bedtime  metoprolol tartrate 12.5 milliGRAM(s) Oral two times a day  minocycline 100 milliGRAM(s) Oral two times a day  multivitamin 1 Tablet(s) Oral daily  pantoprazole    Tablet 40 milliGRAM(s) Oral before breakfast  polyethylene glycol 3350 17 Gram(s) Oral two times a day  simvastatin 20 milliGRAM(s) Oral at bedtime  tiotropium 18 MICROgram(s) Capsule 1 Capsule(s) Inhalation daily  warfarin 1 milliGRAM(s) Oral once    MEDICATIONS  (PRN):  acetaminophen   Tablet. 650 milliGRAM(s) Oral every 6 hours PRN Mild Pain (1 - 3)  bisacodyl Suppository 10 milliGRAM(s) Rectal daily PRN Constipation  bismuth subsalicylate Liquid 30 milliLiter(s) Oral every 6 hours PRN Cramping/abd pain  HYDROmorphone   Tablet 4 milliGRAM(s) Oral every 4 hours PRN Severe Pain (7 - 10)  HYDROmorphone   Tablet 2 milliGRAM(s) Oral every 3 hours PRN Moderate Pain (4 - 6)  magnesium hydroxide Suspension 30 milliLiter(s) Oral daily PRN Constipation  senna 2 Tablet(s) Oral at bedtime PRN Constipation  simethicone 80 milliGRAM(s) Chew two times a day PRN Gas                        8.8    11.50 )-----------( 310      ( 12 Jul 2018 08:00 )             29.3       07-11    141  |  99  |  30<H>  ----------------------------<  123<H>  4.3   |  29  |  0.81    Ca    9.6      11 Jul 2018 07:11      PT/INR - ( 11 Jul 2018 08:08 )   PT: 15.5 sec;   INR: 1.36 ratio         PTT - ( 11 Jul 2018 08:08 )  PTT:36.8 sec      ROS:  Negative except for:    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  Artificial cardiac pacemaker: 12/25/17  H/O cardiac catheterization: 11/29/16 HERBERT placed on LAD  H/O endoscopy: with dilatation of esophageal stricture 2013  S/P cataract surgery: x2; 3-4yr ago  S/P gastroplasty: 28 yrs ago  S/P cholecystectomy: more than 15 years ago  S/P total knee replacement: left, 15yr ago  S/P total knee replacement: right, 12yr ago  S/P hysterectomy: 24yr ago      SOCIAL HISTORY:    FAMILY HISTORY:  No pertinent family history in first degree relatives      Allergies    amoxicillin (Other)    Intolerances    IV Contrast (Flushing (Skin); Nausea)      PHYSICAL EXAM as per attending  General: adult in NAD  HEENT: clear oropharynx, anicteric sclera, pink conjunctivae  Neck: supple  CV: normal S1S2 with no murmur rubs or gallops  Lungs: clear to auscultation, no wheezes, no rhales  Abdomen: soft non-tender non-distended, no hepato/splenomegaly  Ext: no clubbing cyanosis or edema  Skin: no rashes and no petichiae  Neuro: alert and oriented X3 no focal deficits  several wounds as described  BLOOD SMEAR INTERPRETATION:    RADIOLOGY :

## 2018-07-13 NOTE — PROGRESS NOTE ADULT - SUBJECTIVE AND OBJECTIVE BOX
CC: f/u for wounds on adipose areas    Patient reports it hurts to have the dressing     REVIEW OF SYSTEMS:  All other review of systems negative (Comprehensive ROS)    Antimicrobials Day #  :  minocycline 100 milliGRAM(s) Oral two times a day    Other Medications Reviewed    T(F): 97.6 (07-13-18 @ 09:02), Max: 98.7 (07-12-18 @ 17:38)  HR: 75 (07-13-18 @ 09:02)  BP: 109/71 (07-13-18 @ 09:02)  RR: 18 (07-13-18 @ 09:02)  SpO2: 92% (07-13-18 @ 09:02)  Wt(kg): --    PHYSICAL EXAM:  General: alert, no acute distress  Eyes:  anicteric, no conjunctival injection, no discharge  Oropharynx: no lesions or injection 	  Neck: supple, without adenopathy  Lungs: clear to auscultation  Heart: s1s2 2/6 sys m  Abdomen: soft, nondistended, nontender, without mass or organomegaly. vac  Skin: no lesions  Extremities: deep wounds that are clean, some more superficial ulcers. vac over knee  Neurologic: alert, o, moves all extremities    LAB RESULTS:                        8.9    10.36 )-----------( 291      ( 13 Jul 2018 09:30 )             28.7     07-13    139  |  98  |  27<H>  ----------------------------<  124<H>  4.3   |  29  |  0.87    Ca    10.0      13 Jul 2018 07:17        Assessment:  Patient with right knee septic prosthetic knee s/p rudy , spacer, rudy, spacer on suppressive minocycline with ongoing slow wound healing but has vac. Gets wounds spontaneously on adipose rich areas over the past several months which are not infectious, bx fairly nonspecific, I suspect an immune mediated process.   Plan:  continue minocycline for knee  No ID objection to systemic steroids.   Await latest skin biopsy

## 2018-07-13 NOTE — PROGRESS NOTE ADULT - SUBJECTIVE AND OBJECTIVE BOX
Patient is a 77y old  Female who presents with a chief complaint of fever, lethargy (07 Jun 2018 22:19)      SUBJECTIVE / OVERNIGHT EVENTS:    Patient seen and examined. denies cp sob nvd. denies cough.      Vital Signs Last 24 Hrs  T(C): 36.7 (12 Jul 2018 22:13), Max: 37.1 (12 Jul 2018 17:38)  T(F): 98 (12 Jul 2018 22:13), Max: 98.7 (12 Jul 2018 17:38)  HR: 86 (12 Jul 2018 22:13) (76 - 92)  BP: 115/78 (12 Jul 2018 22:13) (102/61 - 115/78)  BP(mean): --  RR: 18 (12 Jul 2018 22:13) (18 - 18)  SpO2: 98% (12 Jul 2018 22:13) (92% - 98%)  I&O's Summary    12 Jul 2018 07:01  -  13 Jul 2018 07:00  --------------------------------------------------------  IN: 680 mL / OUT: 0 mL / NET: 680 mL        PE:  GENERAL: NAD, AAOx3, obese  HEAD:  Atraumatic, Normocephalic  EYES: EOMI, PERRLA, conjunctiva and sclera clear  NECK: Supple, No JVD  CHEST/LUNG: CTABL, No wheeze  HEART: Regular rate and rhythm; no murmur  ABDOMEN: Soft, Nontender, Nondistended; Bowel sounds present, lower abd wound vac  EXTREMITIES:  2+ Peripheral Pulses, No clubbing, cyanosis, or edema  SKIN: left medial leg dressing, right medial leg dressing, right knee wound vac, right lateral thigh vac  NEURO: No focal deficits    LABS:                        8.8    11.50 )-----------( 310      ( 12 Jul 2018 08:00 )             29.3     07-13    139  |  98  |  27<H>  ----------------------------<  124<H>  4.3   |  29  |  0.87    Ca    10.0      13 Jul 2018 07:17      PT/INR - ( 12 Jul 2018 08:18 )   PT: 20.1 sec;   INR: 1.76 ratio         PTT - ( 12 Jul 2018 08:18 )  PTT:39.1 sec  CAPILLARY BLOOD GLUCOSE                RADIOLOGY & ADDITIONAL TESTS:    Imaging Personally Reviewed:  [x] YES  [ ] NO    Consultant(s) Notes Reviewed:  [x] YES  [ ] NO    MEDICATIONS  (STANDING):  ALPRAZolam 0.25 milliGRAM(s) Oral once  ascorbic acid 500 milliGRAM(s) Oral daily  aspirin enteric coated 81 milliGRAM(s) Oral daily  betamethasone valerate 0.1% Ointment 1 Application(s) Topical two times a day  buDESOnide 160 MICROgram(s)/formoterol 4.5 MICROgram(s) Inhaler 2 Puff(s) Inhalation two times a day  calcium carbonate 1250 mG + Vitamin D (OsCal 500 + D) 1 Tablet(s) Oral three times a day  Dakins Solution - 1/4 Strength 1 Application(s) Topical two times a day  folic acid 1 milliGRAM(s) Oral daily  gabapentin 300 milliGRAM(s) Oral three times a day  loratadine 10 milliGRAM(s) Oral daily  melatonin 3 milliGRAM(s) Oral at bedtime  metoprolol tartrate 12.5 milliGRAM(s) Oral two times a day  minocycline 100 milliGRAM(s) Oral two times a day  multivitamin 1 Tablet(s) Oral daily  pantoprazole    Tablet 40 milliGRAM(s) Oral before breakfast  polyethylene glycol 3350 17 Gram(s) Oral two times a day  simvastatin 20 milliGRAM(s) Oral at bedtime  tiotropium 18 MICROgram(s) Capsule 1 Capsule(s) Inhalation daily    MEDICATIONS  (PRN):  acetaminophen   Tablet. 650 milliGRAM(s) Oral every 6 hours PRN Mild Pain (1 - 3)  bisacodyl Suppository 10 milliGRAM(s) Rectal daily PRN Constipation  bismuth subsalicylate Liquid 30 milliLiter(s) Oral every 6 hours PRN Cramping/abd pain  HYDROmorphone   Tablet 4 milliGRAM(s) Oral every 4 hours PRN Severe Pain (7 - 10)  HYDROmorphone   Tablet 2 milliGRAM(s) Oral every 3 hours PRN Moderate Pain (4 - 6)  magnesium hydroxide Suspension 30 milliLiter(s) Oral daily PRN Constipation  senna 2 Tablet(s) Oral at bedtime PRN Constipation  simethicone 80 milliGRAM(s) Chew two times a day PRN Gas      Care Discussed with Consultants/Other Providers [x] YES  [ ] NO    HEALTH ISSUES - PROBLEM Dx:  Urticaria: Urticaria  Joint infection: Joint infection  Other problems related to medical facilities and other health care: Other problems related to medical facilities and other health care  S/P TAVR (transcatheter aortic valve replacement): S/P TAVR (transcatheter aortic valve replacement)  Acute cystitis without hematuria: Acute cystitis without hematuria  Wound infection: Wound infection  Acute appendicitis, unspecified acute appendicitis type: Acute appendicitis, unspecified acute appendicitis type  Fever, unspecified fever cause: Fever, unspecified fever cause

## 2018-07-13 NOTE — PROGRESS NOTE ADULT - ATTENDING COMMENTS
Tentative diagnosis of pyoderma gangrenosum. Will present as a clinical image case at grand rounds. In the interim, will treat the early lesions with intralesional Kenalog as test spots. Pyoderma gangrenosum is treated with immunosuppression. If there is a diagnostic consensus of PG, may consider treatment with infliximab. Awaiting final pathology results. Will continue to follow.     Radha Britt MD [facility]

## 2018-07-13 NOTE — PROGRESS NOTE ADULT - ASSESSMENT
76 yo F with a PMH of CAD s/p HERBERT, AS s/p TAVR, bradycardia s/p PM, MVR, DVT/PE, and s/p TKR who has recurrent, deep skin ulcers of unknown etiology despite multiple skin biopsies and tissue cultures.    1. Skin ulcers on the lower abdomen and lower extremities. Differential diagnosis includes pyoderma gangrenosum  - s/p numerous skin biopsies, etiology remains unclear as the biopsies have been non-diagnostic. No sign of cutaneous infection on prior biopsies.   - New lesions on R lateral thigh are non-specific, however the lesions on the L lateral thigh are suspicious for pyoderma gangrenosum. At this time the working diagnosis is pyoderma gangrenosum. Punch biopsy results are pending.  - If biopsy is consistent with pyoderma gangrenosum, will recommend initiating treatment with systemic steroids and a steroid sparing agent pending response.   - If the biopsy result does not reveal any organisms, will plan to treat the 2 newest lesions on the right lateral thigh with intralesional Kenalog 40mg/cc.  - Patient seen and discussed with Dr. Britt

## 2018-07-13 NOTE — PROGRESS NOTE ADULT - SUBJECTIVE AND OBJECTIVE BOX
INTERVAL HPI/OVERNIGHT EVENTS:  No acute events overnight. No new lesions. Lesions are painful.    HPI:    78 yo F with a PMH of CAD s/p HERBERT, AS s/p TAVR, bradycardia s/p PM, MVR, DVT/PE, and s/p TKR who presented on 6/7 with appendicitis.  Dermatology has been following her for ulcers since 5/23/18 during her previous admission. We are re-consulted for a new lesion on the R thigh. Painful. Noticed on Monday. Growing. Worried that this too will ulcerate. Otherwise well.    When Pt was initially consulted in late May for R knee ulcer, the ulcer was already debrided and it was difficult to understand how the ulcer initially arose. At the time of the initial, pt already had wound VAC placed in lower abdomen and R anterior thigh. Over the course of the month, pt developed new ulcers and lesions.    Biopsies were obtained from ulcers on R medial knee on May 23 (debrided prior to biopsy) and L inner thigh on Jun 13 (new ulcer) along with tissue cultures. The biopsies showed mixed inflammatory patterns. Bacterial tissue cx's were reviewed by ID, and they were thought to be results of colonization. Mycobacteria and fungal cx were negative. Another tissue cx obtained from subcutaneous tissue from R lateral thigh on Jun 18 to rule out atypical mycobacteria has been negative.    Another biopsy from L wrist and L thigh (pt's daughter reported the thigh lesion appeared to be how all the ulcers started) on Jun 22 revealed urticaria and excoriation.      Seen with her aid, Lissa.     MEDICATIONS  (STANDING):  ALPRAZolam 0.25 milliGRAM(s) Oral once  ascorbic acid 500 milliGRAM(s) Oral daily  aspirin enteric coated 81 milliGRAM(s) Oral daily  betamethasone valerate 0.1% Ointment 1 Application(s) Topical two times a day  buDESOnide 160 MICROgram(s)/formoterol 4.5 MICROgram(s) Inhaler 2 Puff(s) Inhalation two times a day  calcium carbonate 1250 mG + Vitamin D (OsCal 500 + D) 1 Tablet(s) Oral three times a day  Dakins Solution - 1/4 Strength 1 Application(s) Topical two times a day  folic acid 1 milliGRAM(s) Oral daily  gabapentin 300 milliGRAM(s) Oral three times a day  loratadine 10 milliGRAM(s) Oral daily  melatonin 3 milliGRAM(s) Oral at bedtime  metoprolol tartrate 12.5 milliGRAM(s) Oral two times a day  minocycline 100 milliGRAM(s) Oral two times a day  multivitamin 1 Tablet(s) Oral daily  pantoprazole    Tablet 40 milliGRAM(s) Oral before breakfast  polyethylene glycol 3350 17 Gram(s) Oral two times a day  simvastatin 20 milliGRAM(s) Oral at bedtime  tiotropium 18 MICROgram(s) Capsule 1 Capsule(s) Inhalation daily  warfarin 1 milliGRAM(s) Oral once    MEDICATIONS  (PRN):  acetaminophen   Tablet. 650 milliGRAM(s) Oral every 6 hours PRN Mild Pain (1 - 3)  bisacodyl Suppository 10 milliGRAM(s) Rectal daily PRN Constipation  bismuth subsalicylate Liquid 30 milliLiter(s) Oral every 6 hours PRN Cramping/abd pain  HYDROmorphone   Tablet 4 milliGRAM(s) Oral every 4 hours PRN Severe Pain (7 - 10)  HYDROmorphone   Tablet 2 milliGRAM(s) Oral every 3 hours PRN Moderate Pain (4 - 6)  magnesium hydroxide Suspension 30 milliLiter(s) Oral daily PRN Constipation  senna 2 Tablet(s) Oral at bedtime PRN Constipation  simethicone 80 milliGRAM(s) Chew two times a day PRN Gas      Allergies    amoxicillin (Other)    Intolerances    IV Contrast (Flushing (Skin); Nausea)      REVIEW OF SYSTEMS      General: no fevers/chills, no NS	    Skin: see HPI  	  Ophthalmologic: no eye pain or change in vision    Genitourinary: no dysuria or hematuria    Musculoskeletal: no joint pains or weakness	    Neurological:no weakness or tingling          Vital Signs Last 24 Hrs  T(C): 36.4 (13 Jul 2018 09:02), Max: 37.1 (12 Jul 2018 17:38)  T(F): 97.6 (13 Jul 2018 09:02), Max: 98.7 (12 Jul 2018 17:38)  HR: 75 (13 Jul 2018 09:02) (75 - 86)  BP: 109/71 (13 Jul 2018 09:02) (109/71 - 115/78)  BP(mean): --  RR: 18 (13 Jul 2018 09:02) (18 - 18)  SpO2: 92% (13 Jul 2018 09:02) (92% - 98%)    PHYSICAL EXAM:   The patient was alert and oriented X 3, well nourished, and in no  apparent distress.  There was no visible lymphadenopathy.  Conjunctiva were non injected  There was no clubbing or edema of extremities.  There was no hyperhidrosis or bromhidrosis.    Of note on skin exam:   R lateral thigh with 2 indurated plaques with central hemorrhagic crust/eschar and 3 cm, well circumscribed, full thickness ulcer with fibrinous base and minimal surrounding erythema.    L lateral thigh: DEEP 2 cm oval ulcer, well-demarcated with undermining and surrounding erythema    Wound vac on lower abdomen, R thigh. L thigh    LABS:                        8.9    10.36 )-----------( 291      ( 13 Jul 2018 09:30 )             28.7     07-13    139  |  98  |  27<H>  ----------------------------<  124<H>  4.3   |  29  |  0.87    Ca    10.0      13 Jul 2018 07:17      PT/INR - ( 13 Jul 2018 08:02 )   PT: 30.3 sec;   INR: 2.63 ratio         PTT - ( 12 Jul 2018 08:18 )  PTT:39.1 sec      RADIOLOGY & ADDITIONAL TESTS:

## 2018-07-13 NOTE — PROGRESS NOTE ADULT - ASSESSMENT
A/P: s/p Right total knee insertion of spacer, irrigation and debridement, complex wound closure 3/23; readmitted 6/8 for appendicitis    - D/w primary team regarding pain consult  - cont vac/dressing changes to R knee, thigh, and abdomen M/W/F  - remainder of wounds per wound care/derm  - ok for discharge from PRS perspective    Plastic Surgery (pg TOYA: [id number], NS: [phone])

## 2018-07-14 LAB
CULTURE RESULTS: SIGNIFICANT CHANGE UP
CULTURE RESULTS: SIGNIFICANT CHANGE UP
INR BLD: 3.53 RATIO — HIGH (ref 0.88–1.16)
PROTHROM AB SERPL-ACNC: 39.1 SEC — HIGH (ref 9.8–12.7)
SPECIMEN SOURCE: SIGNIFICANT CHANGE UP
SPECIMEN SOURCE: SIGNIFICANT CHANGE UP

## 2018-07-14 RX ORDER — HYDROMORPHONE HYDROCHLORIDE 2 MG/ML
1 INJECTION INTRAMUSCULAR; INTRAVENOUS; SUBCUTANEOUS EVERY 4 HOURS
Qty: 0 | Refills: 0 | Status: DISCONTINUED | OUTPATIENT
Start: 2018-07-14 | End: 2018-07-14

## 2018-07-14 RX ORDER — ACETAMINOPHEN 500 MG
1000 TABLET ORAL DAILY
Qty: 0 | Refills: 0 | Status: COMPLETED | OUTPATIENT
Start: 2018-07-14 | End: 2018-07-14

## 2018-07-14 RX ADMIN — Medication 81 MILLIGRAM(S): at 13:42

## 2018-07-14 RX ADMIN — GABAPENTIN 300 MILLIGRAM(S): 400 CAPSULE ORAL at 06:28

## 2018-07-14 RX ADMIN — Medication 1 MILLIGRAM(S): at 13:41

## 2018-07-14 RX ADMIN — Medication 1000 MILLIGRAM(S): at 17:52

## 2018-07-14 RX ADMIN — Medication 3 MILLIGRAM(S): at 21:08

## 2018-07-14 RX ADMIN — Medication 1 TABLET(S): at 13:41

## 2018-07-14 RX ADMIN — Medication 12.5 MILLIGRAM(S): at 17:50

## 2018-07-14 RX ADMIN — MINOCYCLINE HYDROCHLORIDE 100 MILLIGRAM(S): 45 TABLET, EXTENDED RELEASE ORAL at 06:28

## 2018-07-14 RX ADMIN — BUDESONIDE AND FORMOTEROL FUMARATE DIHYDRATE 2 PUFF(S): 160; 4.5 AEROSOL RESPIRATORY (INHALATION) at 06:30

## 2018-07-14 RX ADMIN — Medication 12.5 MILLIGRAM(S): at 06:29

## 2018-07-14 RX ADMIN — MINOCYCLINE HYDROCHLORIDE 100 MILLIGRAM(S): 45 TABLET, EXTENDED RELEASE ORAL at 17:41

## 2018-07-14 RX ADMIN — HYDROMORPHONE HYDROCHLORIDE 2 MILLIGRAM(S): 2 INJECTION INTRAMUSCULAR; INTRAVENOUS; SUBCUTANEOUS at 07:19

## 2018-07-14 RX ADMIN — HYDROMORPHONE HYDROCHLORIDE 2 MILLIGRAM(S): 2 INJECTION INTRAMUSCULAR; INTRAVENOUS; SUBCUTANEOUS at 08:19

## 2018-07-14 RX ADMIN — Medication 650 MILLIGRAM(S): at 07:00

## 2018-07-14 RX ADMIN — POLYETHYLENE GLYCOL 3350 17 GRAM(S): 17 POWDER, FOR SOLUTION ORAL at 06:30

## 2018-07-14 RX ADMIN — LORATADINE 10 MILLIGRAM(S): 10 TABLET ORAL at 13:41

## 2018-07-14 RX ADMIN — HYDROMORPHONE HYDROCHLORIDE 2 MILLIGRAM(S): 2 INJECTION INTRAMUSCULAR; INTRAVENOUS; SUBCUTANEOUS at 16:14

## 2018-07-14 RX ADMIN — Medication 1 APPLICATION(S): at 17:42

## 2018-07-14 RX ADMIN — Medication 1 APPLICATION(S): at 06:37

## 2018-07-14 RX ADMIN — BUDESONIDE AND FORMOTEROL FUMARATE DIHYDRATE 2 PUFF(S): 160; 4.5 AEROSOL RESPIRATORY (INHALATION) at 17:42

## 2018-07-14 RX ADMIN — Medication 1 TABLET(S): at 13:40

## 2018-07-14 RX ADMIN — Medication 1 APPLICATION(S): at 07:20

## 2018-07-14 RX ADMIN — SIMVASTATIN 20 MILLIGRAM(S): 20 TABLET, FILM COATED ORAL at 21:08

## 2018-07-14 RX ADMIN — Medication 1 TABLET(S): at 21:08

## 2018-07-14 RX ADMIN — HYDROMORPHONE HYDROCHLORIDE 2 MILLIGRAM(S): 2 INJECTION INTRAMUSCULAR; INTRAVENOUS; SUBCUTANEOUS at 21:07

## 2018-07-14 RX ADMIN — HYDROMORPHONE HYDROCHLORIDE 2 MILLIGRAM(S): 2 INJECTION INTRAMUSCULAR; INTRAVENOUS; SUBCUTANEOUS at 17:00

## 2018-07-14 RX ADMIN — Medication 400 MILLIGRAM(S): at 17:37

## 2018-07-14 RX ADMIN — Medication 1 TABLET(S): at 06:29

## 2018-07-14 RX ADMIN — GABAPENTIN 300 MILLIGRAM(S): 400 CAPSULE ORAL at 13:41

## 2018-07-14 RX ADMIN — Medication 650 MILLIGRAM(S): at 06:29

## 2018-07-14 RX ADMIN — Medication 500 MILLIGRAM(S): at 13:41

## 2018-07-14 RX ADMIN — PANTOPRAZOLE SODIUM 40 MILLIGRAM(S): 20 TABLET, DELAYED RELEASE ORAL at 06:29

## 2018-07-14 RX ADMIN — HYDROMORPHONE HYDROCHLORIDE 2 MILLIGRAM(S): 2 INJECTION INTRAMUSCULAR; INTRAVENOUS; SUBCUTANEOUS at 22:12

## 2018-07-14 RX ADMIN — GABAPENTIN 300 MILLIGRAM(S): 400 CAPSULE ORAL at 21:08

## 2018-07-14 RX ADMIN — TIOTROPIUM BROMIDE 1 CAPSULE(S): 18 CAPSULE ORAL; RESPIRATORY (INHALATION) at 13:41

## 2018-07-14 NOTE — PROGRESS NOTE ADULT - SUBJECTIVE AND OBJECTIVE BOX
Patient is a 77y old  Female who presents with a chief complaint of fever, lethargy (07 Jun 2018 22:19)      SUBJECTIVE / OVERNIGHT EVENTS:    Patient seen and examined. tearful this morning. does not know why she is crying. does complain of some pain in the right leg.      Vital Signs Last 24 Hrs  T(C): 36.4 (14 Jul 2018 08:30), Max: 37 (13 Jul 2018 14:26)  T(F): 97.5 (14 Jul 2018 08:30), Max: 98.6 (13 Jul 2018 14:26)  HR: 94 (14 Jul 2018 08:30) (69 - 94)  BP: 124/69 (14 Jul 2018 08:30) (104/66 - 124/69)  BP(mean): --  RR: 18 (14 Jul 2018 08:30) (16 - 18)  SpO2: 100% (14 Jul 2018 08:30) (92% - 100%)  I&O's Summary    13 Jul 2018 07:01  -  14 Jul 2018 07:00  --------------------------------------------------------  IN: 680 mL / OUT: 300 mL / NET: 380 mL        PE:  GENERAL: NAD, AAOx3, obese  HEAD:  Atraumatic, Normocephalic  EYES: EOMI, PERRLA, conjunctiva and sclera clear  NECK: Supple, No JVD  CHEST/LUNG: CTABL, No wheeze  HEART: Regular rate and rhythm; no murmur  ABDOMEN: Soft, Nontender, Nondistended; Bowel sounds present, lower abd wound vac  EXTREMITIES:  2+ Peripheral Pulses, No clubbing, cyanosis, or edema  SKIN: left medial leg dressing, right medial leg dressing, right knee wound vac, right lateral thigh vac  NEURO: No focal deficits    LABS:                        8.9    10.36 )-----------( 291      ( 13 Jul 2018 09:30 )             28.7     07-13    139  |  98  |  27<H>  ----------------------------<  124<H>  4.3   |  29  |  0.87    Ca    10.0      13 Jul 2018 07:17      PT/INR - ( 14 Jul 2018 07:38 )   PT: 39.1 sec;   INR: 3.53 ratio           CAPILLARY BLOOD GLUCOSE                RADIOLOGY & ADDITIONAL TESTS:    Imaging Personally Reviewed:  [x] YES  [ ] NO    Consultant(s) Notes Reviewed:  [x] YES  [ ] NO    MEDICATIONS  (STANDING):  ALPRAZolam 0.25 milliGRAM(s) Oral once  ascorbic acid 500 milliGRAM(s) Oral daily  aspirin enteric coated 81 milliGRAM(s) Oral daily  betamethasone valerate 0.1% Ointment 1 Application(s) Topical two times a day  buDESOnide 160 MICROgram(s)/formoterol 4.5 MICROgram(s) Inhaler 2 Puff(s) Inhalation two times a day  calcium carbonate 1250 mG + Vitamin D (OsCal 500 + D) 1 Tablet(s) Oral three times a day  Dakins Solution - 1/4 Strength 1 Application(s) Topical two times a day  folic acid 1 milliGRAM(s) Oral daily  gabapentin 300 milliGRAM(s) Oral three times a day  loratadine 10 milliGRAM(s) Oral daily  melatonin 3 milliGRAM(s) Oral at bedtime  metoprolol tartrate 12.5 milliGRAM(s) Oral two times a day  minocycline 100 milliGRAM(s) Oral two times a day  multivitamin 1 Tablet(s) Oral daily  pantoprazole    Tablet 40 milliGRAM(s) Oral before breakfast  polyethylene glycol 3350 17 Gram(s) Oral two times a day  simvastatin 20 milliGRAM(s) Oral at bedtime  tiotropium 18 MICROgram(s) Capsule 1 Capsule(s) Inhalation daily    MEDICATIONS  (PRN):  acetaminophen   Tablet. 650 milliGRAM(s) Oral every 6 hours PRN Mild Pain (1 - 3)  bisacodyl Suppository 10 milliGRAM(s) Rectal daily PRN Constipation  bismuth subsalicylate Liquid 30 milliLiter(s) Oral every 6 hours PRN Cramping/abd pain  HYDROmorphone   Tablet 2 milliGRAM(s) Oral every 4 hours PRN Severe Pain (7 - 10)  HYDROmorphone   Tablet 1 milliGRAM(s) Oral every 4 hours PRN Moderate Pain (4 - 6)  magnesium hydroxide Suspension 30 milliLiter(s) Oral daily PRN Constipation  senna 2 Tablet(s) Oral at bedtime PRN Constipation  simethicone 80 milliGRAM(s) Chew two times a day PRN Gas      Care Discussed with Consultants/Other Providers [x] YES  [ ] NO    HEALTH ISSUES - PROBLEM Dx:  Urticaria: Urticaria  Joint infection: Joint infection  Other problems related to medical facilities and other health care: Other problems related to medical facilities and other health care  S/P TAVR (transcatheter aortic valve replacement): S/P TAVR (transcatheter aortic valve replacement)  Acute cystitis without hematuria: Acute cystitis without hematuria  Wound infection: Wound infection  Acute appendicitis, unspecified acute appendicitis type: Acute appendicitis, unspecified acute appendicitis type  Fever, unspecified fever cause: Fever, unspecified fever cause

## 2018-07-14 NOTE — PROGRESS NOTE ADULT - ASSESSMENT
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 Pt completed 10 days meropenem.     # abd wound, multiple bl thigh wounds  pt with new R thigh lesion, sp punch biopsy by derm 7/11, fu biopsy for concern of pyoderma gangrenosum  7/13 sp intralesional kenalog lesion on right thigh, awaiting biopsy results, if pos for PG, will need systemic therapy  continue vac to abdomen, rt Knee, right lat thigh vac   appreciate wound care recs  prior biopsies were not consistent with pyoderma gangrenosum  6/22 left thigh and left arm punch biopsies resulted and consistent with urticaria. evaluated by allergy.  tissue culture grew coag neg staph likely colonized    # Pain control  we have not been able to secure pain management consult  titrated dilaudid 1mgq4 prn for mod pain, 2mg q4 prn for severe pain  episode of decreased saturation yesterday with 4mg dilaudid, CXR neg, RA % on RA now  try to avoid IV dilaudid, but okay to give 0.5mg IV before wound changes if necessary  can try IV tylenol for pain if uncontrolled  gabapentin 300mg tid  bowel regimen    # polymyalgia rheumatica  steroids have been on hold to allow for wound healing  no signs of active rheum dz, evaluated by rheum 6/29    # TKR wound-mrsa  chronic minocycline suppression  appreciate ID fu    # chr Anemia  stable, monitor h/h closely  appreciate heme recs    # S/P TAVR (transcatheter aortic valve replacement)  continue Coumadin, daily INR    # adjustment disorder with depressed mood  xanax prn    dispo:   will await new R thigh punch biopsy    plan of care dw patient and aid at bedside  dw NP  dw patient's son Olga over the [phone] 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 Pt completed 10 days meropenem.     # abd wound, multiple bl thigh wounds  pt with new R thigh lesion, sp punch biopsy by derm 7/11, fu biopsy for concern of pyoderma gangrenosum  7/13 sp intralesional kenalog lesion on right thigh, awaiting biopsy results, if pos for PG, will need systemic therapy  continue vac to abdomen, rt Knee, right lat thigh vac   appreciate wound care recs  prior biopsies were not consistent with pyoderma gangrenosum  6/22 left thigh and left arm punch biopsies resulted and consistent with urticaria. evaluated by allergy.  tissue culture grew coag neg staph likely colonized    # Pain control  we have not been able to secure pain management consult  titrated dilaudid 2mg q4 prn for severe pain, dw pharmacy and do not have 1mg tablets, as we are trying to titrate down dilaudid as dw family, will try IV tylenol 1g  qd PRN for moderate pain  episode of decreased saturation yesterday with 4mg dilaudid, CXR neg, RA % on RA now  try to avoid IV dilaudid, but okay to give 0.5mg IV before wound changes if necessary  can try IV tylenol for pain if uncontrolled  gabapentin 300mg tid  bowel regimen    # polymyalgia rheumatica  steroids have been on hold to allow for wound healing  no signs of active rheum dz, evaluated by rheum 6/29    # TKR wound-mrsa  chronic minocycline suppression  appreciate ID fu    # chr Anemia  stable, monitor h/h closely  appreciate heme recs    # S/P TAVR (transcatheter aortic valve replacement)  continue Coumadin, daily INR    # adjustment disorder with depressed mood  xanax prn    dispo:   will await new R thigh punch biopsy    plan of care dw patient and aid at bedside  dw NP  dw patient's son Olga over the [phone] 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 Pt completed 10 days meropenem.     # abd wound, multiple bl thigh wounds  pt with new R thigh lesion, sp punch biopsy by derm 7/11, fu biopsy for concern of pyoderma gangrenosum  7/13 sp intralesional kenalog lesion on right thigh, awaiting biopsy results, if pos for PG, will need systemic therapy  continue vac to abdomen, rt Knee, right lat thigh vac   appreciate wound care recs  prior biopsies were not consistent with pyoderma gangrenosum  6/22 left thigh and left arm punch biopsies resulted and consistent with urticaria. evaluated by allergy.  tissue culture grew coag neg staph likely colonized    # Pain control  we have not been able to secure pain management consult  titrated dilaudid 2mg q4 prn for severe pain, dw pharmacy and do not have 1mg tablets, as we are trying to titrate down dilaudid as dw family, will try IV tylenol 1g  qd PRN for moderate pain  episode of decreased saturation yesterday with 4mg dilaudid, CXR neg, RA % on RA now  try to avoid IV dilaudid, but okay to give 0.5mg IV before wound changes if necessary  can try IV tylenol for pain if uncontrolled  gabapentin 300mg tid  bowel regimen    # polymyalgia rheumatica  steroids have been on hold to allow for wound healing  no signs of active rheum dz, evaluated by rheum 6/29    # TKR wound-mrsa  chronic minocycline suppression  appreciate ID fu    # chr Anemia  stable, monitor h/h closely  appreciate heme recs    # S/P TAVR (transcatheter aortic valve replacement)  INR supratherapeutic today, hold coumadin today, daily INR    # adjustment disorder with depressed mood  xanax prn    dispo:   will await new R thigh punch biopsy    plan of care dw patient and aid at bedside  yasmin NP  dw patient's son Olga over the phone [phone]

## 2018-07-15 LAB
INR BLD: 3.82 RATIO — HIGH (ref 0.88–1.16)
PROTHROM AB SERPL-ACNC: 42.8 SEC — HIGH (ref 9.8–12.7)

## 2018-07-15 RX ORDER — HYDROMORPHONE HYDROCHLORIDE 2 MG/ML
0.5 INJECTION INTRAMUSCULAR; INTRAVENOUS; SUBCUTANEOUS ONCE
Qty: 0 | Refills: 0 | Status: DISCONTINUED | OUTPATIENT
Start: 2018-07-15 | End: 2018-07-15

## 2018-07-15 RX ADMIN — HYDROMORPHONE HYDROCHLORIDE 0.5 MILLIGRAM(S): 2 INJECTION INTRAMUSCULAR; INTRAVENOUS; SUBCUTANEOUS at 06:55

## 2018-07-15 RX ADMIN — HYDROMORPHONE HYDROCHLORIDE 0.5 MILLIGRAM(S): 2 INJECTION INTRAMUSCULAR; INTRAVENOUS; SUBCUTANEOUS at 07:10

## 2018-07-15 RX ADMIN — PANTOPRAZOLE SODIUM 40 MILLIGRAM(S): 20 TABLET, DELAYED RELEASE ORAL at 05:56

## 2018-07-15 RX ADMIN — BUDESONIDE AND FORMOTEROL FUMARATE DIHYDRATE 2 PUFF(S): 160; 4.5 AEROSOL RESPIRATORY (INHALATION) at 16:33

## 2018-07-15 RX ADMIN — HYDROMORPHONE HYDROCHLORIDE 2 MILLIGRAM(S): 2 INJECTION INTRAMUSCULAR; INTRAVENOUS; SUBCUTANEOUS at 06:49

## 2018-07-15 RX ADMIN — HYDROMORPHONE HYDROCHLORIDE 2 MILLIGRAM(S): 2 INJECTION INTRAMUSCULAR; INTRAVENOUS; SUBCUTANEOUS at 05:45

## 2018-07-15 RX ADMIN — Medication 1 TABLET(S): at 21:45

## 2018-07-15 RX ADMIN — GABAPENTIN 300 MILLIGRAM(S): 400 CAPSULE ORAL at 05:56

## 2018-07-15 RX ADMIN — Medication 650 MILLIGRAM(S): at 14:35

## 2018-07-15 RX ADMIN — Medication 1 APPLICATION(S): at 16:33

## 2018-07-15 RX ADMIN — Medication 650 MILLIGRAM(S): at 21:45

## 2018-07-15 RX ADMIN — Medication 81 MILLIGRAM(S): at 11:35

## 2018-07-15 RX ADMIN — Medication 3 MILLIGRAM(S): at 21:45

## 2018-07-15 RX ADMIN — MINOCYCLINE HYDROCHLORIDE 100 MILLIGRAM(S): 45 TABLET, EXTENDED RELEASE ORAL at 16:33

## 2018-07-15 RX ADMIN — HYDROMORPHONE HYDROCHLORIDE 2 MILLIGRAM(S): 2 INJECTION INTRAMUSCULAR; INTRAVENOUS; SUBCUTANEOUS at 11:59

## 2018-07-15 RX ADMIN — Medication 1 MILLIGRAM(S): at 11:35

## 2018-07-15 RX ADMIN — GABAPENTIN 300 MILLIGRAM(S): 400 CAPSULE ORAL at 16:33

## 2018-07-15 RX ADMIN — Medication 500 MILLIGRAM(S): at 11:35

## 2018-07-15 RX ADMIN — Medication 1 TABLET(S): at 11:35

## 2018-07-15 RX ADMIN — Medication 1 APPLICATION(S): at 05:44

## 2018-07-15 RX ADMIN — LORATADINE 10 MILLIGRAM(S): 10 TABLET ORAL at 11:35

## 2018-07-15 RX ADMIN — GABAPENTIN 300 MILLIGRAM(S): 400 CAPSULE ORAL at 21:45

## 2018-07-15 RX ADMIN — MINOCYCLINE HYDROCHLORIDE 100 MILLIGRAM(S): 45 TABLET, EXTENDED RELEASE ORAL at 05:56

## 2018-07-15 RX ADMIN — Medication 1 TABLET(S): at 05:56

## 2018-07-15 RX ADMIN — Medication 12.5 MILLIGRAM(S): at 06:04

## 2018-07-15 RX ADMIN — SIMVASTATIN 20 MILLIGRAM(S): 20 TABLET, FILM COATED ORAL at 21:45

## 2018-07-15 RX ADMIN — TIOTROPIUM BROMIDE 1 CAPSULE(S): 18 CAPSULE ORAL; RESPIRATORY (INHALATION) at 11:35

## 2018-07-15 RX ADMIN — BUDESONIDE AND FORMOTEROL FUMARATE DIHYDRATE 2 PUFF(S): 160; 4.5 AEROSOL RESPIRATORY (INHALATION) at 05:56

## 2018-07-15 RX ADMIN — Medication 650 MILLIGRAM(S): at 13:35

## 2018-07-15 RX ADMIN — HYDROMORPHONE HYDROCHLORIDE 2 MILLIGRAM(S): 2 INJECTION INTRAMUSCULAR; INTRAVENOUS; SUBCUTANEOUS at 12:59

## 2018-07-15 RX ADMIN — Medication 650 MILLIGRAM(S): at 22:15

## 2018-07-15 RX ADMIN — Medication 12.5 MILLIGRAM(S): at 16:33

## 2018-07-15 NOTE — PROGRESS NOTE ADULT - ASSESSMENT
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 Pt completed 10 days meropenem.     # abd wound, multiple bl thigh wounds  pt with new R thigh lesion, sp punch biopsy by derm 7/11, fu biopsy for concern of pyoderma gangrenosum  7/13 sp intralesional kenalog lesion on right thigh, awaiting biopsy results, if pos for PG, will need systemic therapy  continue vac to abdomen, rt Knee, right lat thigh vac   appreciate wound care recs  prior biopsies were not consistent with pyoderma gangrenosum, however, pt continues to develop new ulcers  6/22 left thigh and left arm punch biopsies resulted and consistent with urticaria. evaluated by allergy.  tissue culture grew coag neg staph likely colonized    # Pain control  we have not been able to secure pain management consult  will cont dilaudid 2mg q4 PRN for severe pain and will try IV tylenol 1g  qd PRN for moderate pain  try to avoid IV dilaudid, but okay to give 0.5mg IV before wound changes if necessary  gabapentin 300mg tid  bowel regimen    # polymyalgia rheumatica  steroids have been on hold to allow for wound healing  no signs of active rheum dz, evaluated by rheum 6/29    # TKR wound-mrsa  chronic minocycline suppression  appreciate ID fu, no contraindications to systemic therapy    # chr Anemia  stable, monitor h/h closely  appreciate heme recs    # S/P TAVR (transcatheter aortic valve replacement)  INR supratherapeutic, hold coumadin today, daily INR    # adjustment disorder with depressed mood  xanax prn    dispo:   will await new R thigh punch biopsy    plan of care dw patient and aid at bedside

## 2018-07-15 NOTE — PROGRESS NOTE ADULT - SUBJECTIVE AND OBJECTIVE BOX
Patient is a 77y old  Female who presents with a chief complaint of fever, lethargy (07 Jun 2018 22:19)      SUBJECTIVE / OVERNIGHT EVENTS:    Patient seen and examined. denies cp sob. pain controlled. sats well >97% on RA.      Vital Signs Last 24 Hrs  T(C): 36.6 (14 Jul 2018 21:09), Max: 36.6 (14 Jul 2018 16:54)  T(F): 97.8 (14 Jul 2018 21:09), Max: 97.8 (14 Jul 2018 16:54)  HR: 70 (15 Jul 2018 06:28) (65 - 94)  BP: 143/80 (15 Jul 2018 06:28) (105/56 - 143/80)  BP(mean): --  RR: 16 (14 Jul 2018 21:09) (16 - 18)  SpO2: 100% (14 Jul 2018 21:09) (94% - 100%)  I&O's Summary    14 Jul 2018 07:01  -  15 Jul 2018 07:00  --------------------------------------------------------  IN: 0 mL / OUT: 225 mL / NET: -225 mL        PE:  GENERAL: NAD, AAOx3, obese  HEAD:  Atraumatic, Normocephalic  EYES: EOMI, PERRLA, conjunctiva and sclera clear  NECK: Supple, No JVD  CHEST/LUNG: CTABL, No wheeze  HEART: Regular rate and rhythm; no murmur  ABDOMEN: Soft, Nontender, Nondistended; Bowel sounds present, lower abd wound vac  EXTREMITIES:  2+ Peripheral Pulses, No clubbing, cyanosis, or edema  SKIN: left medial leg dressing, right medial leg dressing, right knee wound vac, right lateral thigh vac  NEURO: No focal deficits    LABS:                        8.9    10.36 )-----------( 291      ( 13 Jul 2018 09:30 )             28.7           PT/INR - ( 15 Jul 2018 07:18 )   PT: 42.8 sec;   INR: 3.82 ratio           CAPILLARY BLOOD GLUCOSE                RADIOLOGY & ADDITIONAL TESTS:    Imaging Personally Reviewed:  [x] YES  [ ] NO    Consultant(s) Notes Reviewed:  [x] YES  [ ] NO    MEDICATIONS  (STANDING):  ALPRAZolam 0.25 milliGRAM(s) Oral once  ascorbic acid 500 milliGRAM(s) Oral daily  aspirin enteric coated 81 milliGRAM(s) Oral daily  betamethasone valerate 0.1% Ointment 1 Application(s) Topical two times a day  buDESOnide 160 MICROgram(s)/formoterol 4.5 MICROgram(s) Inhaler 2 Puff(s) Inhalation two times a day  calcium carbonate 1250 mG + Vitamin D (OsCal 500 + D) 1 Tablet(s) Oral three times a day  Dakins Solution - 1/4 Strength 1 Application(s) Topical two times a day  folic acid 1 milliGRAM(s) Oral daily  gabapentin 300 milliGRAM(s) Oral three times a day  loratadine 10 milliGRAM(s) Oral daily  melatonin 3 milliGRAM(s) Oral at bedtime  metoprolol tartrate 12.5 milliGRAM(s) Oral two times a day  minocycline 100 milliGRAM(s) Oral two times a day  multivitamin 1 Tablet(s) Oral daily  pantoprazole    Tablet 40 milliGRAM(s) Oral before breakfast  polyethylene glycol 3350 17 Gram(s) Oral two times a day  simvastatin 20 milliGRAM(s) Oral at bedtime  tiotropium 18 MICROgram(s) Capsule 1 Capsule(s) Inhalation daily    MEDICATIONS  (PRN):  acetaminophen   Tablet. 650 milliGRAM(s) Oral every 6 hours PRN Mild Pain (1 - 3)  bisacodyl Suppository 10 milliGRAM(s) Rectal daily PRN Constipation  bismuth subsalicylate Liquid 30 milliLiter(s) Oral every 6 hours PRN Cramping/abd pain  HYDROmorphone   Tablet 2 milliGRAM(s) Oral every 4 hours PRN Severe Pain (7 - 10)  magnesium hydroxide Suspension 30 milliLiter(s) Oral daily PRN Constipation  senna 2 Tablet(s) Oral at bedtime PRN Constipation  simethicone 80 milliGRAM(s) Chew two times a day PRN Gas      Care Discussed with Consultants/Other Providers [x] YES  [ ] NO    HEALTH ISSUES - PROBLEM Dx:  Urticaria: Urticaria  Joint infection: Joint infection  Other problems related to medical facilities and other health care: Other problems related to medical facilities and other health care  S/P TAVR (transcatheter aortic valve replacement): S/P TAVR (transcatheter aortic valve replacement)  Acute cystitis without hematuria: Acute cystitis without hematuria  Wound infection: Wound infection  Acute appendicitis, unspecified acute appendicitis type: Acute appendicitis, unspecified acute appendicitis type  Fever, unspecified fever cause: Fever, unspecified fever cause

## 2018-07-16 LAB
ANA TITR SER: NEGATIVE — SIGNIFICANT CHANGE UP
CARDIOLIPIN AB SER-ACNC: NEGATIVE — SIGNIFICANT CHANGE UP
HCT VFR BLD CALC: 27 % — LOW (ref 34.5–45)
HGB BLD-MCNC: 8.4 G/DL — LOW (ref 11.5–15.5)
INR BLD: 3.47 RATIO — HIGH (ref 0.88–1.16)
MCHC RBC-ENTMCNC: 26.8 PG — LOW (ref 27–34)
MCHC RBC-ENTMCNC: 31.1 GM/DL — LOW (ref 32–36)
MCV RBC AUTO: 86 FL — SIGNIFICANT CHANGE UP (ref 80–100)
PLATELET # BLD AUTO: 310 K/UL — SIGNIFICANT CHANGE UP (ref 150–400)
PROTHROM AB SERPL-ACNC: 38.4 SEC — HIGH (ref 9.8–12.7)
RBC # BLD: 3.14 M/UL — LOW (ref 3.8–5.2)
RBC # FLD: 17.2 % — HIGH (ref 10.3–14.5)
WBC # BLD: 9.58 K/UL — SIGNIFICANT CHANGE UP (ref 3.8–10.5)
WBC # FLD AUTO: 9.58 K/UL — SIGNIFICANT CHANGE UP (ref 3.8–10.5)

## 2018-07-16 PROCEDURE — 99232 SBSQ HOSP IP/OBS MODERATE 35: CPT

## 2018-07-16 RX ORDER — TRAMADOL HYDROCHLORIDE 50 MG/1
25 TABLET ORAL ONCE
Qty: 0 | Refills: 0 | Status: DISCONTINUED | OUTPATIENT
Start: 2018-07-16 | End: 2018-07-16

## 2018-07-16 RX ORDER — ACETAMINOPHEN 500 MG
1000 TABLET ORAL ONCE
Qty: 0 | Refills: 0 | Status: DISCONTINUED | OUTPATIENT
Start: 2018-07-16 | End: 2018-07-16

## 2018-07-16 RX ORDER — HYDROMORPHONE HYDROCHLORIDE 2 MG/ML
0.5 INJECTION INTRAMUSCULAR; INTRAVENOUS; SUBCUTANEOUS ONCE
Qty: 0 | Refills: 0 | Status: DISCONTINUED | OUTPATIENT
Start: 2018-07-16 | End: 2018-07-16

## 2018-07-16 RX ORDER — ACETAMINOPHEN 500 MG
1000 TABLET ORAL ONCE
Qty: 0 | Refills: 0 | Status: COMPLETED | OUTPATIENT
Start: 2018-07-16 | End: 2018-07-16

## 2018-07-16 RX ORDER — HYDROMORPHONE HYDROCHLORIDE 2 MG/ML
0.5 INJECTION INTRAMUSCULAR; INTRAVENOUS; SUBCUTANEOUS EVERY 12 HOURS
Qty: 0 | Refills: 0 | Status: DISCONTINUED | OUTPATIENT
Start: 2018-07-16 | End: 2018-07-23

## 2018-07-16 RX ADMIN — Medication 1 TABLET(S): at 14:51

## 2018-07-16 RX ADMIN — HYDROMORPHONE HYDROCHLORIDE 2 MILLIGRAM(S): 2 INJECTION INTRAMUSCULAR; INTRAVENOUS; SUBCUTANEOUS at 09:42

## 2018-07-16 RX ADMIN — Medication 1 APPLICATION(S): at 05:49

## 2018-07-16 RX ADMIN — HYDROMORPHONE HYDROCHLORIDE 2 MILLIGRAM(S): 2 INJECTION INTRAMUSCULAR; INTRAVENOUS; SUBCUTANEOUS at 21:20

## 2018-07-16 RX ADMIN — BUDESONIDE AND FORMOTEROL FUMARATE DIHYDRATE 2 PUFF(S): 160; 4.5 AEROSOL RESPIRATORY (INHALATION) at 17:51

## 2018-07-16 RX ADMIN — HYDROMORPHONE HYDROCHLORIDE 2 MILLIGRAM(S): 2 INJECTION INTRAMUSCULAR; INTRAVENOUS; SUBCUTANEOUS at 10:12

## 2018-07-16 RX ADMIN — Medication 1 MILLIGRAM(S): at 14:51

## 2018-07-16 RX ADMIN — Medication 1000 MILLIGRAM(S): at 07:00

## 2018-07-16 RX ADMIN — HYDROMORPHONE HYDROCHLORIDE 0.5 MILLIGRAM(S): 2 INJECTION INTRAMUSCULAR; INTRAVENOUS; SUBCUTANEOUS at 11:44

## 2018-07-16 RX ADMIN — Medication 1 APPLICATION(S): at 17:50

## 2018-07-16 RX ADMIN — Medication 12.5 MILLIGRAM(S): at 05:59

## 2018-07-16 RX ADMIN — TIOTROPIUM BROMIDE 1 CAPSULE(S): 18 CAPSULE ORAL; RESPIRATORY (INHALATION) at 14:52

## 2018-07-16 RX ADMIN — PANTOPRAZOLE SODIUM 40 MILLIGRAM(S): 20 TABLET, DELAYED RELEASE ORAL at 06:02

## 2018-07-16 RX ADMIN — Medication 1 TABLET(S): at 21:00

## 2018-07-16 RX ADMIN — Medication 81 MILLIGRAM(S): at 14:51

## 2018-07-16 RX ADMIN — HYDROMORPHONE HYDROCHLORIDE 2 MILLIGRAM(S): 2 INJECTION INTRAMUSCULAR; INTRAVENOUS; SUBCUTANEOUS at 15:14

## 2018-07-16 RX ADMIN — HYDROMORPHONE HYDROCHLORIDE 2 MILLIGRAM(S): 2 INJECTION INTRAMUSCULAR; INTRAVENOUS; SUBCUTANEOUS at 04:40

## 2018-07-16 RX ADMIN — MINOCYCLINE HYDROCHLORIDE 100 MILLIGRAM(S): 45 TABLET, EXTENDED RELEASE ORAL at 17:50

## 2018-07-16 RX ADMIN — HYDROMORPHONE HYDROCHLORIDE 2 MILLIGRAM(S): 2 INJECTION INTRAMUSCULAR; INTRAVENOUS; SUBCUTANEOUS at 21:00

## 2018-07-16 RX ADMIN — HYDROMORPHONE HYDROCHLORIDE 2 MILLIGRAM(S): 2 INJECTION INTRAMUSCULAR; INTRAVENOUS; SUBCUTANEOUS at 14:44

## 2018-07-16 RX ADMIN — Medication 1 TABLET(S): at 14:53

## 2018-07-16 RX ADMIN — MINOCYCLINE HYDROCHLORIDE 100 MILLIGRAM(S): 45 TABLET, EXTENDED RELEASE ORAL at 06:02

## 2018-07-16 RX ADMIN — SIMVASTATIN 20 MILLIGRAM(S): 20 TABLET, FILM COATED ORAL at 21:00

## 2018-07-16 RX ADMIN — Medication 12.5 MILLIGRAM(S): at 17:50

## 2018-07-16 RX ADMIN — GABAPENTIN 300 MILLIGRAM(S): 400 CAPSULE ORAL at 21:00

## 2018-07-16 RX ADMIN — BUDESONIDE AND FORMOTEROL FUMARATE DIHYDRATE 2 PUFF(S): 160; 4.5 AEROSOL RESPIRATORY (INHALATION) at 06:02

## 2018-07-16 RX ADMIN — Medication 400 MILLIGRAM(S): at 06:56

## 2018-07-16 RX ADMIN — TRAMADOL HYDROCHLORIDE 25 MILLIGRAM(S): 50 TABLET ORAL at 17:28

## 2018-07-16 RX ADMIN — LORATADINE 10 MILLIGRAM(S): 10 TABLET ORAL at 14:51

## 2018-07-16 RX ADMIN — Medication 3 MILLIGRAM(S): at 21:00

## 2018-07-16 RX ADMIN — GABAPENTIN 300 MILLIGRAM(S): 400 CAPSULE ORAL at 06:01

## 2018-07-16 RX ADMIN — Medication 650 MILLIGRAM(S): at 17:26

## 2018-07-16 RX ADMIN — GABAPENTIN 300 MILLIGRAM(S): 400 CAPSULE ORAL at 14:48

## 2018-07-16 RX ADMIN — HYDROMORPHONE HYDROCHLORIDE 2 MILLIGRAM(S): 2 INJECTION INTRAMUSCULAR; INTRAVENOUS; SUBCUTANEOUS at 05:10

## 2018-07-16 RX ADMIN — Medication 650 MILLIGRAM(S): at 16:56

## 2018-07-16 RX ADMIN — Medication 500 MILLIGRAM(S): at 14:51

## 2018-07-16 RX ADMIN — HYDROMORPHONE HYDROCHLORIDE 0.5 MILLIGRAM(S): 2 INJECTION INTRAMUSCULAR; INTRAVENOUS; SUBCUTANEOUS at 12:14

## 2018-07-16 RX ADMIN — Medication 1 TABLET(S): at 06:02

## 2018-07-16 NOTE — PROGRESS NOTE ADULT - SUBJECTIVE AND OBJECTIVE BOX
Plastic Surgery Progress Note (pg [facility]: [id number], NS: [phone])    SUBJECTIVE:  The patient was seen and examined. No acute events overnight.     OBJECTIVE:     ** VITAL SIGNS / I&O's **    Vital Signs Last 24 Hrs  T(C): 36.6 (16 Jul 2018 15:39), Max: 36.7 (16 Jul 2018 06:17)  T(F): 97.8 (16 Jul 2018 15:39), Max: 98.1 (16 Jul 2018 06:17)  HR: 77 (16 Jul 2018 15:39) (63 - 78)  BP: 128/80 (16 Jul 2018 15:39) (115/64 - 154/84)  BP(mean): --  RR: 18 (16 Jul 2018 15:39) (18 - 20)  SpO2: 93% (16 Jul 2018 15:39) (91% - 100%)      15 Jul 2018 07:01  -  16 Jul 2018 07:00  --------------------------------------------------------  IN:    Oral Fluid: 500 mL    Solution: 100 mL  Total IN: 600 mL    OUT:    Voided: 200 mL  Total OUT: 200 mL    Total NET: 400 mL      16 Jul 2018 07:01  -  16 Jul 2018 16:20  --------------------------------------------------------  IN:  Total IN: 0 mL    OUT:    Voided: 200 mL  Total OUT: 200 mL    Total NET: -200 mL          ** PHYSICAL EXAM **    -- CONSTITUTIONAL: Alert, NAD   -- ABDOMEN: central abd VAC intact and set to suction.   -- EXTREMITIES: R lateral thigh wound now with more necrosis and erythema, malodorous discharge- switched to W2D dressings. R knee vac is off irrigation, holding suction. R medial thigh- wet to dry dressing applied.     ** LABS **                          8.4    9.58  )-----------( 310      ( 16 Jul 2018 09:31 )             27.0           PT/INR - ( 16 Jul 2018 06:57 )   PT: 38.4 sec;   INR: 3.47 ratio           CAPILLARY BLOOD GLUCOSE                    ** MEDICATIONS **  MEDICATIONS  (STANDING):  ALPRAZolam 0.25 milliGRAM(s) Oral once  ascorbic acid 500 milliGRAM(s) Oral daily  aspirin enteric coated 81 milliGRAM(s) Oral daily  betamethasone valerate 0.1% Ointment 1 Application(s) Topical two times a day  buDESOnide 160 MICROgram(s)/formoterol 4.5 MICROgram(s) Inhaler 2 Puff(s) Inhalation two times a day  calcium carbonate 1250 mG + Vitamin D (OsCal 500 + D) 1 Tablet(s) Oral three times a day  Dakins Solution - 1/4 Strength 1 Application(s) Topical two times a day  folic acid 1 milliGRAM(s) Oral daily  gabapentin 300 milliGRAM(s) Oral three times a day  loratadine 10 milliGRAM(s) Oral daily  melatonin 3 milliGRAM(s) Oral at bedtime  metoprolol tartrate 12.5 milliGRAM(s) Oral two times a day  minocycline 100 milliGRAM(s) Oral two times a day  multivitamin 1 Tablet(s) Oral daily  pantoprazole    Tablet 40 milliGRAM(s) Oral before breakfast  polyethylene glycol 3350 17 Gram(s) Oral two times a day  simvastatin 20 milliGRAM(s) Oral at bedtime  tiotropium 18 MICROgram(s) Capsule 1 Capsule(s) Inhalation daily    MEDICATIONS  (PRN):  acetaminophen   Tablet. 650 milliGRAM(s) Oral every 6 hours PRN Mild Pain (1 - 3)  bisacodyl Suppository 10 milliGRAM(s) Rectal daily PRN Constipation  bismuth subsalicylate Liquid 30 milliLiter(s) Oral every 6 hours PRN Cramping/abd pain  HYDROmorphone   Tablet 2 milliGRAM(s) Oral every 4 hours PRN Severe Pain (7 - 10)  magnesium hydroxide Suspension 30 milliLiter(s) Oral daily PRN Constipation  senna 2 Tablet(s) Oral at bedtime PRN Constipation  simethicone 80 milliGRAM(s) Chew two times a day PRN Gas

## 2018-07-16 NOTE — PROGRESS NOTE ADULT - SUBJECTIVE AND OBJECTIVE BOX
pain controlled    afvss  nad  abdominal wound - vac  RLE  thigh - dressing per wound care teams, lateral wound wet to dry per prs/wc teams  knee wound with vac  remainder of wounds dressed by wound care  5/5 ta/ehl/gcs  silt l4-s1  2+ dp pulse    ROS: depressed

## 2018-07-16 NOTE — PROGRESS NOTE ADULT - ASSESSMENT
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 Pt completed 10 days meropenem.     # abd wound, multiple bl thigh wounds  pt with new R thigh lesion, sp punch biopsy by derm 7/11, fu biopsy for concern of pyoderma gangrenosum  7/13 sp intralesional kenalog lesion on right thigh, awaiting biopsy results, if pos for PG, will need systemic therapy  continue vac to abdomen, rt Knee, right lat thigh vac   appreciate wound care recs  prior biopsies were not consistent with pyoderma gangrenosum, however, pt continues to develop new ulcers  6/22 left thigh and left arm punch biopsies resulted and consistent with urticaria. evaluated by allergy.  tissue culture grew coag neg staph likely colonized    # Pain control  we have not been able to secure pain management consult  will cont dilaudid 2mg q4 PRN for severe pain and will try IV tylenol 1g  qd PRN for moderate pain  try to avoid IV dilaudid, but okay to give 0.5mg IV before wound changes if necessary  gabapentin 300mg tid  bowel regimen    # polymyalgia rheumatica  steroids have been on hold to allow for wound healing  no signs of active rheum dz, evaluated by rheum 6/29    # TKR wound-mrsa  chronic minocycline suppression  appreciate ID fu, no contraindications to systemic therapy    # chr Anemia  stable, monitor h/h closely  appreciate heme recs    # S/P TAVR (transcatheter aortic valve replacement)  INR supratherapeutic, hold coumadin today, daily INR    # adjustment disorder with depressed mood  xanax prn    dispo:   will await new R thigh punch biopsy    plan of care dw patient and aid at bedside

## 2018-07-16 NOTE — PROGRESS NOTE ADULT - SUBJECTIVE AND OBJECTIVE BOX
Patient is a 77y old  Female who presents with a chief complaint of fever, lethargy (07 Jun 2018 22:19)      SUBJECTIVE / OVERNIGHT EVENTS:    Patient seen and examined. in better spirits today. does not remember telling staff that she "can't handle this anymore." denies cp sob. smiling.      Vital Signs Last 24 Hrs  T(C): 36.7 (16 Jul 2018 06:17), Max: 36.7 (15 Jul 2018 14:00)  T(F): 98.1 (16 Jul 2018 06:17), Max: 98.1 (16 Jul 2018 06:17)  HR: 63 (16 Jul 2018 06:17) (63 - 78)  BP: 154/84 (16 Jul 2018 06:17) (115/64 - 154/84)  BP(mean): --  RR: 18 (15 Jul 2018 21:24) (18 - 20)  SpO2: 100% (15 Jul 2018 21:24) (94% - 100%)  I&O's Summary    15 Jul 2018 07:01  -  16 Jul 2018 07:00  --------------------------------------------------------  IN: 600 mL / OUT: 200 mL / NET: 400 mL        PE:  GENERAL: NAD, AAOx3, obese  HEAD:  Atraumatic, Normocephalic  EYES: EOMI, PERRLA, conjunctiva and sclera clear  NECK: Supple, No JVD  CHEST/LUNG: CTABL, No wheeze  HEART: Regular rate and rhythm; no murmur  ABDOMEN: Soft, Nontender, Nondistended; Bowel sounds present, lower abd wound vac  EXTREMITIES:  2+ Peripheral Pulses, No clubbing, cyanosis, or edema  SKIN: left medial leg dressing, right medial leg dressing, right knee wound vac, right lateral thigh vac  NEURO: No focal deficits        LABS:          PT/INR - ( 16 Jul 2018 06:57 )   PT: 38.4 sec;   INR: 3.47 ratio           CAPILLARY BLOOD GLUCOSE                RADIOLOGY & ADDITIONAL TESTS:    Imaging Personally Reviewed:  [x] YES  [ ] NO    Consultant(s) Notes Reviewed:  [x] YES  [ ] NO    MEDICATIONS  (STANDING):  ALPRAZolam 0.25 milliGRAM(s) Oral once  ascorbic acid 500 milliGRAM(s) Oral daily  aspirin enteric coated 81 milliGRAM(s) Oral daily  betamethasone valerate 0.1% Ointment 1 Application(s) Topical two times a day  buDESOnide 160 MICROgram(s)/formoterol 4.5 MICROgram(s) Inhaler 2 Puff(s) Inhalation two times a day  calcium carbonate 1250 mG + Vitamin D (OsCal 500 + D) 1 Tablet(s) Oral three times a day  Dakins Solution - 1/4 Strength 1 Application(s) Topical two times a day  folic acid 1 milliGRAM(s) Oral daily  gabapentin 300 milliGRAM(s) Oral three times a day  loratadine 10 milliGRAM(s) Oral daily  melatonin 3 milliGRAM(s) Oral at bedtime  metoprolol tartrate 12.5 milliGRAM(s) Oral two times a day  minocycline 100 milliGRAM(s) Oral two times a day  multivitamin 1 Tablet(s) Oral daily  pantoprazole    Tablet 40 milliGRAM(s) Oral before breakfast  polyethylene glycol 3350 17 Gram(s) Oral two times a day  simvastatin 20 milliGRAM(s) Oral at bedtime  tiotropium 18 MICROgram(s) Capsule 1 Capsule(s) Inhalation daily    MEDICATIONS  (PRN):  acetaminophen   Tablet. 650 milliGRAM(s) Oral every 6 hours PRN Mild Pain (1 - 3)  bisacodyl Suppository 10 milliGRAM(s) Rectal daily PRN Constipation  bismuth subsalicylate Liquid 30 milliLiter(s) Oral every 6 hours PRN Cramping/abd pain  HYDROmorphone   Tablet 2 milliGRAM(s) Oral every 4 hours PRN Severe Pain (7 - 10)  magnesium hydroxide Suspension 30 milliLiter(s) Oral daily PRN Constipation  senna 2 Tablet(s) Oral at bedtime PRN Constipation  simethicone 80 milliGRAM(s) Chew two times a day PRN Gas      Care Discussed with Consultants/Other Providers [x] YES  [ ] NO    HEALTH ISSUES - PROBLEM Dx:  Urticaria: Urticaria  Joint infection: Joint infection  Other problems related to medical facilities and other health care: Other problems related to medical facilities and other health care  S/P TAVR (transcatheter aortic valve replacement): S/P TAVR (transcatheter aortic valve replacement)  Acute cystitis without hematuria: Acute cystitis without hematuria  Wound infection: Wound infection  Acute appendicitis, unspecified acute appendicitis type: Acute appendicitis, unspecified acute appendicitis type  Fever, unspecified fever cause: Fever, unspecified fever cause

## 2018-07-16 NOTE — PROGRESS NOTE ADULT - ASSESSMENT
no plan for orthopedic surgical intervention per family and patient preference  vac and wound care per PRS and wound care teams  PO abx per ID team, minocycline  encourage patient to spend majority of bed oob in bedside chair as able, has not been oob recently, PLEASE HAVE PT COME BY TO MOBILIZE PATIENT, she must remain 50% wb on the RLE and NO KNEE MOTION allowed, knee immobilizer if oob  coumadin, inr goal 2-3  continue to optimize nutrition  agree with pain team consult (pending), needs to switch to PO regimen  continue to involve psych given depression symptoms  orthopedic issues are stable for discharge to rehab, please make sure patient and family is okay with discharge before initiating planning process  wbc slig	ht elevation now resolved

## 2018-07-16 NOTE — PROGRESS NOTE ADULT - ASSESSMENT
A/P: s/p Right total knee insertion of spacer, irrigation and debridement, complex wound closure 3/23; readmitted 6/8 for appendicitis    - D/w primary team regarding pain consult  - cont vac/dressing changes to R knee, thigh, and abdomen M/W/F  - remainder of wounds per wound care/derm  - ok for discharge from PRS perspective    Plastic Surgery (pg [facility]: [id number], NS: [phone]) A/P: s/p Right total knee insertion of spacer, irrigation and debridement, complex wound closure 3/23; readmitted 6/8 for appendicitis    - D/w primary team regarding pain consult  - cont vac/dressing changes to R knee, thigh, and abdomen M/W/F  - remainder of wounds per wound care/derm  - ok for discharge from PRS perspective once cleared by all services    Plastic Surgery (pg [facility]: [id number], NS: [phone])

## 2018-07-16 NOTE — PROGRESS NOTE ADULT - SUBJECTIVE AND OBJECTIVE BOX
INTERVAL HPI/OVERNIGHT EVENTS:  No acute events overnight. No new lesions. Lesions are getting larger.     HPI:    76 yo F with a PMH of CAD s/p HERBERT, AS s/p TAVR, bradycardia s/p PM, MVR, DVT/PE, and s/p TKR who presented on 6/7 with appendicitis.  Dermatology has been following her for ulcers since 5/23/18 during her previous admission. We are re-consulted for a new lesion on the R thigh. Painful. Noticed on Monday. Growing. Worried that this too will ulcerate. Otherwise well.    When Pt was initially consulted in late May for R knee ulcer, the ulcer was already debrided and it was difficult to understand how the ulcer initially arose. At the time of the initial, pt already had wound VAC placed in lower abdomen and R anterior thigh. Over the course of the month, pt developed new ulcers and lesions.    Biopsies were obtained from ulcers on R medial knee on May 23 (debrided prior to biopsy) and L inner thigh on Jun 13 (new ulcer) along with tissue cultures. The biopsies showed mixed inflammatory patterns. Bacterial tissue cx's were reviewed by ID, and they were thought to be results of colonization. Mycobacteria and fungal cx were negative. Another tissue cx obtained from subcutaneous tissue from R lateral thigh on Jun 18 to rule out atypical mycobacteria has been negative.    Another biopsy from L wrist and L thigh (pt's daughter reported the thigh lesion appeared to be how all the ulcers started) on Jun 22 revealed urticaria and excoriation.      Seen with her aid, Lissa.     MEDICATIONS  (STANDING):  ALPRAZolam 0.25 milliGRAM(s) Oral once  ascorbic acid 500 milliGRAM(s) Oral daily  aspirin enteric coated 81 milliGRAM(s) Oral daily  betamethasone valerate 0.1% Ointment 1 Application(s) Topical two times a day  buDESOnide 160 MICROgram(s)/formoterol 4.5 MICROgram(s) Inhaler 2 Puff(s) Inhalation two times a day  calcium carbonate 1250 mG + Vitamin D (OsCal 500 + D) 1 Tablet(s) Oral three times a day  Dakins Solution - 1/4 Strength 1 Application(s) Topical two times a day  folic acid 1 milliGRAM(s) Oral daily  gabapentin 300 milliGRAM(s) Oral three times a day  loratadine 10 milliGRAM(s) Oral daily  melatonin 3 milliGRAM(s) Oral at bedtime  metoprolol tartrate 12.5 milliGRAM(s) Oral two times a day  minocycline 100 milliGRAM(s) Oral two times a day  multivitamin 1 Tablet(s) Oral daily  pantoprazole    Tablet 40 milliGRAM(s) Oral before breakfast  polyethylene glycol 3350 17 Gram(s) Oral two times a day  simvastatin 20 milliGRAM(s) Oral at bedtime  tiotropium 18 MICROgram(s) Capsule 1 Capsule(s) Inhalation daily  warfarin 1 milliGRAM(s) Oral once    MEDICATIONS  (PRN):  acetaminophen   Tablet. 650 milliGRAM(s) Oral every 6 hours PRN Mild Pain (1 - 3)  bisacodyl Suppository 10 milliGRAM(s) Rectal daily PRN Constipation  bismuth subsalicylate Liquid 30 milliLiter(s) Oral every 6 hours PRN Cramping/abd pain  HYDROmorphone   Tablet 4 milliGRAM(s) Oral every 4 hours PRN Severe Pain (7 - 10)  HYDROmorphone   Tablet 2 milliGRAM(s) Oral every 3 hours PRN Moderate Pain (4 - 6)  magnesium hydroxide Suspension 30 milliLiter(s) Oral daily PRN Constipation  senna 2 Tablet(s) Oral at bedtime PRN Constipation  simethicone 80 milliGRAM(s) Chew two times a day PRN Gas      Allergies    amoxicillin (Other)    Intolerances    IV Contrast (Flushing (Skin); Nausea)      REVIEW OF SYSTEMS      General: no fevers/chills, no NS	    Skin: see HPI  	  Ophthalmologic: no eye pain or change in vision    Genitourinary: no dysuria or hematuria    Musculoskeletal: no joint pains or weakness	    Neurological:no weakness or tingling          Vital Signs Last 24 Hrs  T(C): 36.4 (13 Jul 2018 09:02), Max: 37.1 (12 Jul 2018 17:38)  T(F): 97.6 (13 Jul 2018 09:02), Max: 98.7 (12 Jul 2018 17:38)  HR: 75 (13 Jul 2018 09:02) (75 - 86)  BP: 109/71 (13 Jul 2018 09:02) (109/71 - 115/78)  BP(mean): --  RR: 18 (13 Jul 2018 09:02) (18 - 18)  SpO2: 92% (13 Jul 2018 09:02) (92% - 98%)    PHYSICAL EXAM:   The patient was alert and oriented X 3, well nourished, and in no  apparent distress.  There was no visible lymphadenopathy.  Conjunctiva were non injected  There was no clubbing or edema of extremities.  There was no hyperhidrosis or bromhidrosis.    Of note on skin exam:   R lateral thigh with 2 indurated plaques with central hemorrhagic crust/eschar and 3 cm, well circumscribed, full thickness ulcer with fibrinous base and minimal surrounding erythema.    L lateral thigh: DEEP 2 cm oval ulcer, well-demarcated with undermining and surrounding erythema    Wound vac on lower abdomen, R thigh. L thigh    LABS:                        8.9    10.36 )-----------( 291      ( 13 Jul 2018 09:30 )             28.7     07-13    139  |  98  |  27<H>  ----------------------------<  124<H>  4.3   |  29  |  0.87    Ca    10.0      13 Jul 2018 07:17      PT/INR - ( 13 Jul 2018 08:02 )   PT: 30.3 sec;   INR: 2.63 ratio         PTT - ( 12 Jul 2018 08:18 )  PTT:39.1 sec      RADIOLOGY & ADDITIONAL TESTS: INTERVAL HPI/OVERNIGHT EVENTS:  No acute events overnight. No new lesions. Lesions are getting larger.     HPI:    78 yo F with a PMH of CAD s/p HERBERT, AS s/p TAVR, bradycardia s/p PM, MVR, DVT/PE, and s/p TKR who presented on 6/7 with appendicitis.  Dermatology has been following her for ulcers since 5/23/18 during her previous admission. We are re-consulted for a new lesion on the R thigh. Painful. Noticed on Monday. Growing. Worried that this too will ulcerate. Otherwise well.    When Pt was initially consulted in late May for R knee ulcer, the ulcer was already debrided and it was difficult to understand how the ulcer initially arose. At the time of the initial, pt already had wound VAC placed in lower abdomen and R anterior thigh. Over the course of the month, pt developed new ulcers and lesions.    Biopsies were obtained from ulcers on R medial knee on May 23 (debrided prior to biopsy) and L inner thigh on Jun 13 (new ulcer) along with tissue cultures. The biopsies showed mixed inflammatory patterns. Bacterial tissue cx's were reviewed by ID, and they were thought to be results of colonization. Mycobacteria and fungal cx were negative. Another tissue cx obtained from subcutaneous tissue from R lateral thigh on Jun 18 to rule out atypical mycobacteria has been negative.    Another biopsy from L wrist and L thigh (pt's daughter reported the thigh lesion appeared to be how all the ulcers started) on Jun 22 revealed urticaria and excoriation.      Seen with her aid, Lissa.     MEDICATIONS  (STANDING):  ALPRAZolam 0.25 milliGRAM(s) Oral once  ascorbic acid 500 milliGRAM(s) Oral daily  aspirin enteric coated 81 milliGRAM(s) Oral daily  betamethasone valerate 0.1% Ointment 1 Application(s) Topical two times a day  buDESOnide 160 MICROgram(s)/formoterol 4.5 MICROgram(s) Inhaler 2 Puff(s) Inhalation two times a day  calcium carbonate 1250 mG + Vitamin D (OsCal 500 + D) 1 Tablet(s) Oral three times a day  Dakins Solution - 1/4 Strength 1 Application(s) Topical two times a day  folic acid 1 milliGRAM(s) Oral daily  gabapentin 300 milliGRAM(s) Oral three times a day  loratadine 10 milliGRAM(s) Oral daily  melatonin 3 milliGRAM(s) Oral at bedtime  metoprolol tartrate 12.5 milliGRAM(s) Oral two times a day  minocycline 100 milliGRAM(s) Oral two times a day  multivitamin 1 Tablet(s) Oral daily  pantoprazole    Tablet 40 milliGRAM(s) Oral before breakfast  polyethylene glycol 3350 17 Gram(s) Oral two times a day  simvastatin 20 milliGRAM(s) Oral at bedtime  tiotropium 18 MICROgram(s) Capsule 1 Capsule(s) Inhalation daily  warfarin 1 milliGRAM(s) Oral once    MEDICATIONS  (PRN):  acetaminophen   Tablet. 650 milliGRAM(s) Oral every 6 hours PRN Mild Pain (1 - 3)  bisacodyl Suppository 10 milliGRAM(s) Rectal daily PRN Constipation  bismuth subsalicylate Liquid 30 milliLiter(s) Oral every 6 hours PRN Cramping/abd pain  HYDROmorphone   Tablet 4 milliGRAM(s) Oral every 4 hours PRN Severe Pain (7 - 10)  HYDROmorphone   Tablet 2 milliGRAM(s) Oral every 3 hours PRN Moderate Pain (4 - 6)  magnesium hydroxide Suspension 30 milliLiter(s) Oral daily PRN Constipation  senna 2 Tablet(s) Oral at bedtime PRN Constipation  simethicone 80 milliGRAM(s) Chew two times a day PRN Gas      Allergies    amoxicillin (Other)    Intolerances    IV Contrast (Flushing (Skin); Nausea)      REVIEW OF SYSTEMS      General: no fevers/chills, no NS	    Skin: see HPI  	  Ophthalmologic: no eye pain or change in vision    Genitourinary: no dysuria or hematuria    Musculoskeletal: no joint pains or weakness	    Neurological:no weakness or tingling          Vital Signs Last 24 Hrs  T(C): 36.4 (13 Jul 2018 09:02), Max: 37.1 (12 Jul 2018 17:38)  T(F): 97.6 (13 Jul 2018 09:02), Max: 98.7 (12 Jul 2018 17:38)  HR: 75 (13 Jul 2018 09:02) (75 - 86)  BP: 109/71 (13 Jul 2018 09:02) (109/71 - 115/78)  BP(mean): --  RR: 18 (13 Jul 2018 09:02) (18 - 18)  SpO2: 92% (13 Jul 2018 09:02) (92% - 98%)    PHYSICAL EXAM:   The patient was alert and oriented X 3, well nourished, and in no  apparent distress.  There was no visible lymphadenopathy.  Conjunctiva were non injected  There was no clubbing or edema of extremities.  There was no hyperhidrosis or bromhidrosis.    Of note on skin exam:   R lateral thigh with 4 cm linear, deep, well circumscribed ulcer and 3 cm, well circumscribed, full thickness ulcer with fibrinous base and minimal surrounding erythema.    L lateral thigh: DEEP 2 cm oval ulcer, well-demarcated with undermining and surrounding erythema    Wound vac on lower abdomen, R thigh. L thigh    LABS:                        8.9    10.36 )-----------( 291      ( 13 Jul 2018 09:30 )             28.7     07-13    139  |  98  |  27<H>  ----------------------------<  124<H>  4.3   |  29  |  0.87    Ca    10.0      13 Jul 2018 07:17      PT/INR - ( 13 Jul 2018 08:02 )   PT: 30.3 sec;   INR: 2.63 ratio         PTT - ( 12 Jul 2018 08:18 )  PTT:39.1 sec      RADIOLOGY & ADDITIONAL TESTS:

## 2018-07-16 NOTE — PROGRESS NOTE ADULT - ASSESSMENT
76 yo F with a PMH of CAD s/p HERBERT, AS s/p TAVR, bradycardia s/p PM, MVR, DVT/PE, and s/p TKR who has recurrent, deep skin ulcers of unknown etiology despite multiple skin biopsies and tissue cultures.    1. Skin ulcers on the lower abdomen and lower extremities. Differential diagnosis includes pyoderma gangrenosum  - s/p numerous skin biopsies, etiology remains unclear as the biopsies, including the biopsy from last week, have been non-diagnostic. No sign of cutaneous infection on prior biopsies.   - New lesions on R lateral thigh are non-specific, however the lesions on the L lateral thigh are suspicious for pyoderma gangrenosum. At this time the working diagnosis is pyoderma gangrenosum.   - If the biopsy result does not reveal any organisms, will plan to treat the 2 newest lesions on the right lateral thigh with intralesional Kenalog 40mg/cc. 76 yo F with a PMH of CAD s/p HERBERT, AS s/p TAVR, bradycardia s/p PM, MVR, DVT/PE, and s/p TKR who has recurrent, deep skin ulcers of unknown etiology despite multiple skin biopsies and tissue cultures.    1. Skin ulcers on the lower abdomen and lower extremities. Differential diagnosis includes pyoderma gangrenosum vs. urticaria with self-inflicted skin injury  - s/p numerous skin biopsies, etiology remains unclear as the biopsies, including the biopsy from last week, have been non-diagnostic. However, the last three biopsies have shown findings consistent with urticaria.  - No sign of cutaneous infection on prior biopsies.   - One lesion on the R lateral thigh was with treated with ILK 40 without improvement  - Given findings of urticaria and stable/improving wounds on the wound vac, would recommend trimming of patients nails as there is chance she is using them to dig into her skin. Would also place wound vacs/special dressings on the new lesions and insure only medical personnel are manipulating the dressings and wound vac.  - Patient was cleared by infectious disease for prednisone use. Pyoderma gangrenosum remains on the differential diagnosis. If there is no other contraindication, start empiric Prednisone 50 mg QD. Response or lack of response will be very informative in establishing a diagnosis of PG.   - Patient was seen and discussed with Dr. Diaz. Will follow along with you.

## 2018-07-16 NOTE — PROGRESS NOTE ADULT - ATTENDING COMMENTS
Patient seen and examined  The abdominal wound medial right thigh wound and knee wound stable and improving   Lateral thigh wound cont wound care per wound care team wet to Dry  Patient needs clear diagnosis for the skin necrosis  Family would like to have the diagnosis before discharge.  Cont wound care

## 2018-07-16 NOTE — PROGRESS NOTE ADULT - SUBJECTIVE AND OBJECTIVE BOX
Patient is a 77y old  Female who presents with a chief complaint of fever, lethargy (07 Jun 2018 22:19)      HPI:  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 [employer] [id number], MVR, 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 with recent admission from (3/9/2018 to 6/6/2018) for TKR c/b joint infection s/p explantation with multiple wounds managed by plastic surgery who presents from rehab 7/10 for evaluation of fever, lethargy, and nausea at rehab.    Patient was recently admitted from 4/9/18 - 6/6/18. Patient was initially presented for right total knee spacer exchange and I&D with complex wound closure. Previously had a spacer exchanged performed on 3/9 and then was discharged to rehab. Patient then returned on 4/9 with fever and concern for sepsis, found to have positive cultures from knee for MRSA as well as multifocal pneumonia. During admission, patient completed course of Daptomycin for MRSA prosthesis infection and was also treated for multifocal PNA with ?aztreonam. Patient had extensive wound care, followed by Surgery/Plastics/Orthopedics/Id and was discharged to rehab on 6/6 with suppressive minocycline. Admission complicated by lesion over thigh and lower pannus requiring wound vacs.  UA On 6/5 positive, but no culture sent and no antibiotics started.      Patient presents from rehab with fevers and lethargy shortly after discharge. In the ED, Tmax 100.6, HR 85,  /62, O2 98% on 2L NC. Labs showed mild leukocytosis with WBC 13.8, stable anemia Hgb 9.6, and normal platelets 319. Metabolic panel overall unremarkable with Cr 0.89. LFTs unremarkable. VBG with lactate 1.3. UA again positive with >50 WBCs, few bacteria, turbid appearance, and large LE.     Patient had a CT abdomen/pelvis that showed evidence of possible distal appendicitis. Evaluated by Surgery and family declining operative intervention due to high risk. Plastics to aid with wound management, to apply wound vac again in AM to thigh and pannus. Patient not felt to have evidence of wound infection. (07 Jun 2018 22:19)    I saw the pt several times on the last admit. and she had a ferritin that was over 400 and was given at times procrit when the hemoglobin got into the 7 range. The pt has a inflammatory anemia and the hemoglobin now is about 8.6 and she is normocytic hypochromic with a high rdw. Will at this time follow the pt and I see no need now for an extensive workup for this anemia. Will support as needed. 7/12 met with pt and aid at bedside and explained approach to the anemia no need for procrit. 7/16 notes reviewed from the last few days and the hemoglobin was 8.4.    Vital Signs Last 24 Hrs  T(C): 36.4 (16 Jul 2018 09:46), Max: 36.7 (15 Jul 2018 14:00)  T(F): 97.5 (16 Jul 2018 09:46), Max: 98.1 (16 Jul 2018 06:17)  HR: 63 (16 Jul 2018 09:46) (63 - 78)  BP: 126/64 (16 Jul 2018 09:46) (115/64 - 154/84)  BP(mean): --  RR: 18 (16 Jul 2018 09:46) (18 - 20)  SpO2: 100% (16 Jul 2018 09:46) (94% - 100%)  MEDICATIONS  (STANDING):  ALPRAZolam 0.25 milliGRAM(s) Oral once  ascorbic acid 500 milliGRAM(s) Oral daily  aspirin enteric coated 81 milliGRAM(s) Oral daily  betamethasone valerate 0.1% Ointment 1 Application(s) Topical two times a day  buDESOnide 160 MICROgram(s)/formoterol 4.5 MICROgram(s) Inhaler 2 Puff(s) Inhalation two times a day  calcium carbonate 1250 mG + Vitamin D (OsCal 500 + D) 1 Tablet(s) Oral three times a day  Dakins Solution - 1/4 Strength 1 Application(s) Topical two times a day  folic acid 1 milliGRAM(s) Oral daily  gabapentin 300 milliGRAM(s) Oral three times a day  loratadine 10 milliGRAM(s) Oral daily  melatonin 3 milliGRAM(s) Oral at bedtime  metoprolol tartrate 12.5 milliGRAM(s) Oral two times a day  minocycline 100 milliGRAM(s) Oral two times a day  multivitamin 1 Tablet(s) Oral daily  pantoprazole    Tablet 40 milliGRAM(s) Oral before breakfast  polyethylene glycol 3350 17 Gram(s) Oral two times a day  simvastatin 20 milliGRAM(s) Oral at bedtime  tiotropium 18 MICROgram(s) Capsule 1 Capsule(s) Inhalation daily    MEDICATIONS  (PRN):  acetaminophen   Tablet. 650 milliGRAM(s) Oral every 6 hours PRN Mild Pain (1 - 3)  bisacodyl Suppository 10 milliGRAM(s) Rectal daily PRN Constipation  bismuth subsalicylate Liquid 30 milliLiter(s) Oral every 6 hours PRN Cramping/abd pain  HYDROmorphone   Tablet 2 milliGRAM(s) Oral every 4 hours PRN Severe Pain (7 - 10)  magnesium hydroxide Suspension 30 milliLiter(s) Oral daily PRN Constipation  senna 2 Tablet(s) Oral at bedtime PRN Constipation  simethicone 80 milliGRAM(s) Chew two times a day PRN Gas    LABS:                        8.8    11.50 )-----------( 310      ( 12 Jul 2018 08:00 )             29.3       07-11    141  |  99  |  30<H>  ----------------------------<  123<H>  4.3   |  29  |  0.81    Ca    9.6      11 Jul 2018 07:11      PT/INR - ( 11 Jul 2018 08:08 )   PT: 15.5 sec;   INR: 1.36 ratio         PTT - ( 11 Jul 2018 08:08 )  PTT:36.8 sec      ROS:  Negative except for:    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  Artificial cardiac pacemaker: 12/25/17  H/O cardiac catheterization: 11/29/16 HERBERT placed on LAD  H/O endoscopy: with dilatation of esophageal stricture 2013  S/P cataract surgery: x2; 3-4yr ago  S/P gastroplasty: 28 yrs ago  S/P cholecystectomy: more than 15 years ago  S/P total knee replacement: left, 15yr ago  S/P total knee replacement: right, 12yr ago  S/P hysterectomy: 24yr ago      SOCIAL HISTORY:    FAMILY HISTORY:  No pertinent family history in first degree relatives      Allergies    amoxicillin (Other)    Intolerances    IV Contrast (Flushing (Skin); Nausea)      PHYSICAL EXAM as per attending and she was in pain being cared for by the staff at the time of my visit.  General: adult in NAD  HEENT: clear oropharynx, anicteric sclera, pink conjunctivae  Neck: supple  CV: normal S1S2 with no murmur rubs or gallops  Lungs: clear to auscultation, no wheezes, no rhales  Abdomen: soft non-tender non-distended, no hepato/splenomegaly  Ext: no clubbing cyanosis or edema  Skin: no rashes and no petichiae  Neuro: alert and oriented X3 no focal deficits    BLOOD SMEAR INTERPRETATION:    RADIOLOGY :

## 2018-07-17 LAB
INR BLD: 2.87 RATIO — HIGH (ref 0.88–1.16)
PROTHROM AB SERPL-ACNC: 32 SEC — HIGH (ref 9.8–12.7)

## 2018-07-17 RX ORDER — TRAMADOL HYDROCHLORIDE 50 MG/1
25 TABLET ORAL ONCE
Qty: 0 | Refills: 0 | Status: DISCONTINUED | OUTPATIENT
Start: 2018-07-17 | End: 2018-07-17

## 2018-07-17 RX ORDER — WARFARIN SODIUM 2.5 MG/1
1 TABLET ORAL ONCE
Qty: 0 | Refills: 0 | Status: COMPLETED | OUTPATIENT
Start: 2018-07-17 | End: 2018-07-17

## 2018-07-17 RX ADMIN — Medication 650 MILLIGRAM(S): at 12:15

## 2018-07-17 RX ADMIN — Medication 1 TABLET(S): at 11:43

## 2018-07-17 RX ADMIN — HYDROMORPHONE HYDROCHLORIDE 0.5 MILLIGRAM(S): 2 INJECTION INTRAMUSCULAR; INTRAVENOUS; SUBCUTANEOUS at 13:21

## 2018-07-17 RX ADMIN — HYDROMORPHONE HYDROCHLORIDE 2 MILLIGRAM(S): 2 INJECTION INTRAMUSCULAR; INTRAVENOUS; SUBCUTANEOUS at 01:44

## 2018-07-17 RX ADMIN — HYDROMORPHONE HYDROCHLORIDE 2 MILLIGRAM(S): 2 INJECTION INTRAMUSCULAR; INTRAVENOUS; SUBCUTANEOUS at 17:51

## 2018-07-17 RX ADMIN — PANTOPRAZOLE SODIUM 40 MILLIGRAM(S): 20 TABLET, DELAYED RELEASE ORAL at 06:01

## 2018-07-17 RX ADMIN — Medication 1 TABLET(S): at 05:57

## 2018-07-17 RX ADMIN — Medication 650 MILLIGRAM(S): at 20:35

## 2018-07-17 RX ADMIN — Medication 1 APPLICATION(S): at 17:52

## 2018-07-17 RX ADMIN — BUDESONIDE AND FORMOTEROL FUMARATE DIHYDRATE 2 PUFF(S): 160; 4.5 AEROSOL RESPIRATORY (INHALATION) at 05:58

## 2018-07-17 RX ADMIN — HYDROMORPHONE HYDROCHLORIDE 2 MILLIGRAM(S): 2 INJECTION INTRAMUSCULAR; INTRAVENOUS; SUBCUTANEOUS at 18:21

## 2018-07-17 RX ADMIN — LORATADINE 10 MILLIGRAM(S): 10 TABLET ORAL at 11:43

## 2018-07-17 RX ADMIN — GABAPENTIN 300 MILLIGRAM(S): 400 CAPSULE ORAL at 05:57

## 2018-07-17 RX ADMIN — GABAPENTIN 300 MILLIGRAM(S): 400 CAPSULE ORAL at 21:30

## 2018-07-17 RX ADMIN — Medication 650 MILLIGRAM(S): at 20:04

## 2018-07-17 RX ADMIN — Medication 650 MILLIGRAM(S): at 12:45

## 2018-07-17 RX ADMIN — TRAMADOL HYDROCHLORIDE 25 MILLIGRAM(S): 50 TABLET ORAL at 16:03

## 2018-07-17 RX ADMIN — Medication 1 MILLIGRAM(S): at 11:43

## 2018-07-17 RX ADMIN — HYDROMORPHONE HYDROCHLORIDE 2 MILLIGRAM(S): 2 INJECTION INTRAMUSCULAR; INTRAVENOUS; SUBCUTANEOUS at 22:33

## 2018-07-17 RX ADMIN — Medication 1 TABLET(S): at 14:08

## 2018-07-17 RX ADMIN — HYDROMORPHONE HYDROCHLORIDE 2 MILLIGRAM(S): 2 INJECTION INTRAMUSCULAR; INTRAVENOUS; SUBCUTANEOUS at 02:33

## 2018-07-17 RX ADMIN — GABAPENTIN 300 MILLIGRAM(S): 400 CAPSULE ORAL at 14:08

## 2018-07-17 RX ADMIN — HYDROMORPHONE HYDROCHLORIDE 0.5 MILLIGRAM(S): 2 INJECTION INTRAMUSCULAR; INTRAVENOUS; SUBCUTANEOUS at 13:51

## 2018-07-17 RX ADMIN — Medication 81 MILLIGRAM(S): at 11:43

## 2018-07-17 RX ADMIN — Medication 12.5 MILLIGRAM(S): at 05:58

## 2018-07-17 RX ADMIN — Medication 650 MILLIGRAM(S): at 06:58

## 2018-07-17 RX ADMIN — Medication 12.5 MILLIGRAM(S): at 17:54

## 2018-07-17 RX ADMIN — Medication 500 MILLIGRAM(S): at 11:44

## 2018-07-17 RX ADMIN — Medication 3 MILLIGRAM(S): at 21:30

## 2018-07-17 RX ADMIN — SIMVASTATIN 20 MILLIGRAM(S): 20 TABLET, FILM COATED ORAL at 21:30

## 2018-07-17 RX ADMIN — MINOCYCLINE HYDROCHLORIDE 100 MILLIGRAM(S): 45 TABLET, EXTENDED RELEASE ORAL at 17:54

## 2018-07-17 RX ADMIN — HYDROMORPHONE HYDROCHLORIDE 2 MILLIGRAM(S): 2 INJECTION INTRAMUSCULAR; INTRAVENOUS; SUBCUTANEOUS at 21:30

## 2018-07-17 RX ADMIN — POLYETHYLENE GLYCOL 3350 17 GRAM(S): 17 POWDER, FOR SOLUTION ORAL at 05:59

## 2018-07-17 RX ADMIN — MINOCYCLINE HYDROCHLORIDE 100 MILLIGRAM(S): 45 TABLET, EXTENDED RELEASE ORAL at 05:59

## 2018-07-17 RX ADMIN — Medication 1 TABLET(S): at 21:30

## 2018-07-17 RX ADMIN — TIOTROPIUM BROMIDE 1 CAPSULE(S): 18 CAPSULE ORAL; RESPIRATORY (INHALATION) at 11:44

## 2018-07-17 RX ADMIN — Medication 1 APPLICATION(S): at 05:58

## 2018-07-17 RX ADMIN — WARFARIN SODIUM 1 MILLIGRAM(S): 2.5 TABLET ORAL at 21:28

## 2018-07-17 RX ADMIN — BUDESONIDE AND FORMOTEROL FUMARATE DIHYDRATE 2 PUFF(S): 160; 4.5 AEROSOL RESPIRATORY (INHALATION) at 17:55

## 2018-07-17 RX ADMIN — Medication 650 MILLIGRAM(S): at 06:28

## 2018-07-17 NOTE — PROGRESS NOTE ADULT - ASSESSMENT
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 Pt completed 10 days meropenem.     # abd wound, multiple bl thigh wounds  pt with new R thigh lesion, sp punch biopsy by derm 7/11, positive for urticaria, not consistent with PG  prior biopsies were not consistent with pyoderma gangrenosum, however, pt continues to develop new ulcers  6/22 left thigh and left arm punch biopsies consistent with urticaria as well. evaluated by allergy.  tissue culture grew coag neg staph likely colonized  I discussed with Mr Rdz [phone] that biopsies not consistent with PG  I discussed derm recs that we could give trial prednisone 50mg and see how wounds respond  Mr Rdz did not want to make this decision. He believes the doctors should make this decision.  As multiple biopsies have not been pos for PG, will hold off trial of prednisone 50mg as HCP is not willing to participate in this decision making.  7/13 sp intralesional kenalog lesion on right thigh, which did not show improvement after 3 days  continue vac to abdomen, rt Knee  appreciate wound care recs, daily dressing changes, wound vac changes every other day  Mr Rdz asks for second infectious disease opinion Dr. Stinson    # Pain control  we have not been able to secure pain management consult, nursing manager is aware  Mr Rdz and Son Thony ask to try to decrease dilaudid  pt still with pain that prohibits dressing changes, so will cont dilaudid 2mg q4 PRN for severe pain  try IV tylenol 1g qd PRN for moderate pain  try to avoid IV dilaudid, but okay to give 0.5mg IV before wound changes if necessary  yesterday tramadol 25mg given for pain, can cont this in efforts to try to avoid dilaudid  gabapentin 300mg tid  bowel regimen    # polymyalgia rheumatica  steroids have been on hold to allow for wound healing  no signs of active rheum dz, evaluated by rheum 6/29    # TKR wound-mrsa  chronic minocycline suppression  appreciate ID recs  family asks for second ID opinion    # chr Anemia  stable, monitor h/h closely  appreciate heme recs    # S/P TAVR (transcatheter aortic valve replacement)  daily coumadin dosing, daily INR    # adjustment disorder with depressed mood  xanax prn    dispo:   most recent biopsy did not show PG  I have been speaking with Mr Rdz [phone] and son Thony [phone] multiple times a day  As of today's conversation with Mr Rdz this morning, we discussed biopsy results and possibility of trial of steroids. he did not want to participate in the discussion of trial of steroids. he believes the doctors should make this decision.   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 Pt completed 10 days meropenem.     # abd wound, multiple bl thigh wounds  pt with new R thigh lesion, sp punch biopsy by derm 7/11, positive for urticaria, not consistent with PG  prior biopsies were not consistent with pyoderma gangrenosum, however, pt continues to develop new ulcers  6/22 left thigh and left arm punch biopsies consistent with urticaria as well. evaluated by allergy.  tissue culture grew coag neg staph likely colonized  I discussed with Mr Rdz [phone] that biopsies not consistent with PG  I discussed derm recs that we could give trial prednisone 50mg and see how wounds respond  Mr Rdz did not want to make this decision. He believes the doctors should make this decision.  As multiple biopsies have not been pos for PG, will hold off trial of prednisone 50mg as HCP is not willing to participate in this decision making.  7/13 sp intralesional kenalog lesion on right thigh, which did not show improvement after 3 days  continue vac to abdomen, rt Knee  appreciate wound care recs, daily dressing changes, wound vac changes every other day  Mr Rdz asks for second infectious disease opinion Dr. Stinson    # Pain control  we have not been able to secure pain management consult, nursing manager is aware  Mr Rdz and Son Thony ask to try to decrease dilaudid  pt still with pain that prohibits dressing changes, so will cont dilaudid 2mg q4 PRN for severe pain  try IV tylenol 1g qd PRN for moderate pain  try to avoid IV dilaudid, but okay to give 0.5mg IV before wound changes if necessary  yesterday tramadol 25mg given for pain, can cont this in efforts to try to avoid dilaudid  gabapentin 300mg tid  bowel regimen    # polymyalgia rheumatica  steroids have been on hold to allow for wound healing  no signs of active rheum dz, evaluated by rheum 6/29    # TKR wound-mrsa  chronic minocycline suppression  appreciate ID recs  family asks for second ID opinion    # chr Anemia  stable, monitor h/h closely  appreciate heme recs    # S/P TAVR (transcatheter aortic valve replacement)  daily coumadin dosing, daily INR    # adjustment disorder with depressed mood  xanax prn    dispo:   yesterday, Mr Rdz asked me to coordinate transfer to [facility] for 2nd opinion on wound care. After multiple phone calls, discussion with admitting, bedboard and medical staff, I have been unable to find an accepting physician, I discussed with Mr Rdz that at this time there are no plans for transfer.  most recent biopsy did not show PG  I have been speaking with Mr Rdz [phone] and son Thony [phone] multiple times a day  As of today's conversation with Mr Rdz this morning, we discussed biopsy results and possibility of trial of steroids. He did not want to participate in the discussion of trial of steroids. He believes the doctors should make this decision. We will hold off at this time as there is no diagnostic evidence to support this diagnosis.  Mr Rdz blames staff for delayed dressing changes and believes this is prolonging wound healing. I referred him to the nursing manager if he is unsatisfied with care  will obtain 2nd ID opinion as requested by family      plan of care dw patient and aid at bedside

## 2018-07-17 NOTE — PROGRESS NOTE ADULT - SUBJECTIVE AND OBJECTIVE BOX
Patient is a 77y old  Female who presents with a chief complaint of fever, lethargy (07 Jun 2018 22:19)      SUBJECTIVE / OVERNIGHT EVENTS:    Patient seen and examined. denies cp sob nvd. denies pain. feels well.       Vital Signs Last 24 Hrs  T(C): 36.4 (17 Jul 2018 08:25), Max: 36.6 (16 Jul 2018 14:28)  T(F): 97.6 (17 Jul 2018 08:25), Max: 97.9 (16 Jul 2018 22:05)  HR: 62 (17 Jul 2018 08:25) (62 - 77)  BP: 121/78 (17 Jul 2018 08:25) (101/66 - 135/83)  BP(mean): --  RR: 20 (17 Jul 2018 08:25) (18 - 20)  SpO2: 96% (17 Jul 2018 08:25) (91% - 100%)  I&O's Summary    16 Jul 2018 07:01  -  17 Jul 2018 07:00  --------------------------------------------------------  IN: 510 mL / OUT: 725 mL / NET: -215 mL        PE:  GENERAL: NAD, AAOx3, obese  HEAD:  Atraumatic, Normocephalic  EYES: EOMI, PERRLA, conjunctiva and sclera clear  NECK: Supple, No JVD  CHEST/LUNG: CTABL, No wheeze  HEART: Regular rate and rhythm; no murmur  ABDOMEN: Soft, Nontender, Nondistended; Bowel sounds present, lower abd wound vac  EXTREMITIES:  2+ Peripheral Pulses, right lateral wound vac removed, wound packed, right knee wound vac, right medial and left medial thigh dressed  SKIN: No rashes or lesions  NEURO: No focal deficits    LABS:                        8.4    9.58  )-----------( 310      ( 16 Jul 2018 09:31 )             27.0           PT/INR - ( 17 Jul 2018 07:21 )   PT: 32.0 sec;   INR: 2.87 ratio           CAPILLARY BLOOD GLUCOSE                RADIOLOGY & ADDITIONAL TESTS:    Imaging Personally Reviewed:  [x] YES  [ ] NO    Consultant(s) Notes Reviewed:  [x] YES  [ ] NO    MEDICATIONS  (STANDING):  ALPRAZolam 0.25 milliGRAM(s) Oral once  ascorbic acid 500 milliGRAM(s) Oral daily  aspirin enteric coated 81 milliGRAM(s) Oral daily  betamethasone valerate 0.1% Ointment 1 Application(s) Topical two times a day  buDESOnide 160 MICROgram(s)/formoterol 4.5 MICROgram(s) Inhaler 2 Puff(s) Inhalation two times a day  calcium carbonate 1250 mG + Vitamin D (OsCal 500 + D) 1 Tablet(s) Oral three times a day  Dakins Solution - 1/4 Strength 1 Application(s) Topical two times a day  folic acid 1 milliGRAM(s) Oral daily  gabapentin 300 milliGRAM(s) Oral three times a day  loratadine 10 milliGRAM(s) Oral daily  melatonin 3 milliGRAM(s) Oral at bedtime  metoprolol tartrate 12.5 milliGRAM(s) Oral two times a day  minocycline 100 milliGRAM(s) Oral two times a day  multivitamin 1 Tablet(s) Oral daily  pantoprazole    Tablet 40 milliGRAM(s) Oral before breakfast  polyethylene glycol 3350 17 Gram(s) Oral two times a day  simvastatin 20 milliGRAM(s) Oral at bedtime  tiotropium 18 MICROgram(s) Capsule 1 Capsule(s) Inhalation daily    MEDICATIONS  (PRN):  acetaminophen   Tablet. 650 milliGRAM(s) Oral every 6 hours PRN Mild Pain (1 - 3)  bisacodyl Suppository 10 milliGRAM(s) Rectal daily PRN Constipation  bismuth subsalicylate Liquid 30 milliLiter(s) Oral every 6 hours PRN Cramping/abd pain  HYDROmorphone   Tablet 2 milliGRAM(s) Oral every 4 hours PRN Severe Pain (7 - 10)  HYDROmorphone  Injectable 0.5 milliGRAM(s) IV Push every 12 hours PRN please give before wound changes  magnesium hydroxide Suspension 30 milliLiter(s) Oral daily PRN Constipation  senna 2 Tablet(s) Oral at bedtime PRN Constipation  simethicone 80 milliGRAM(s) Chew two times a day PRN Gas      Care Discussed with Consultants/Other Providers [x] YES  [ ] NO    HEALTH ISSUES - PROBLEM Dx:  Urticaria: Urticaria  Joint infection: Joint infection  Other problems related to medical facilities and other health care: Other problems related to medical facilities and other health care  S/P TAVR (transcatheter aortic valve replacement): S/P TAVR (transcatheter aortic valve replacement)  Acute cystitis without hematuria: Acute cystitis without hematuria  Wound infection: Wound infection  Acute appendicitis, unspecified acute appendicitis type: Acute appendicitis, unspecified acute appendicitis type  Fever, unspecified fever cause: Fever, unspecified fever cause

## 2018-07-18 LAB
INR BLD: 2.39 RATIO — HIGH (ref 0.88–1.16)
PROTHROM AB SERPL-ACNC: 26.3 SEC — HIGH (ref 9.8–12.7)

## 2018-07-18 PROCEDURE — 99232 SBSQ HOSP IP/OBS MODERATE 35: CPT

## 2018-07-18 PROCEDURE — 99222 1ST HOSP IP/OBS MODERATE 55: CPT

## 2018-07-18 PROCEDURE — 97606 NEG PRS WND THER DME>50 SQCM: CPT

## 2018-07-18 PROCEDURE — 99232 SBSQ HOSP IP/OBS MODERATE 35: CPT | Mod: 25

## 2018-07-18 RX ORDER — ALPRAZOLAM 0.25 MG
0.25 TABLET ORAL DAILY
Qty: 0 | Refills: 0 | Status: DISCONTINUED | OUTPATIENT
Start: 2018-07-18 | End: 2018-07-24

## 2018-07-18 RX ORDER — HYDROMORPHONE HYDROCHLORIDE 2 MG/ML
4 INJECTION INTRAMUSCULAR; INTRAVENOUS; SUBCUTANEOUS ONCE
Qty: 0 | Refills: 0 | Status: DISCONTINUED | OUTPATIENT
Start: 2018-07-18 | End: 2018-07-18

## 2018-07-18 RX ORDER — WARFARIN SODIUM 2.5 MG/1
1 TABLET ORAL ONCE
Qty: 0 | Refills: 0 | Status: COMPLETED | OUTPATIENT
Start: 2018-07-18 | End: 2018-07-18

## 2018-07-18 RX ADMIN — Medication 3 MILLIGRAM(S): at 21:29

## 2018-07-18 RX ADMIN — Medication 1 APPLICATION(S): at 05:43

## 2018-07-18 RX ADMIN — GABAPENTIN 300 MILLIGRAM(S): 400 CAPSULE ORAL at 14:17

## 2018-07-18 RX ADMIN — Medication 50 MILLIGRAM(S): at 18:43

## 2018-07-18 RX ADMIN — MINOCYCLINE HYDROCHLORIDE 100 MILLIGRAM(S): 45 TABLET, EXTENDED RELEASE ORAL at 05:44

## 2018-07-18 RX ADMIN — Medication 0.25 MILLIGRAM(S): at 14:14

## 2018-07-18 RX ADMIN — GABAPENTIN 300 MILLIGRAM(S): 400 CAPSULE ORAL at 05:44

## 2018-07-18 RX ADMIN — Medication 12.5 MILLIGRAM(S): at 05:44

## 2018-07-18 RX ADMIN — WARFARIN SODIUM 1 MILLIGRAM(S): 2.5 TABLET ORAL at 21:55

## 2018-07-18 RX ADMIN — Medication 81 MILLIGRAM(S): at 11:37

## 2018-07-18 RX ADMIN — Medication 1 MILLIGRAM(S): at 11:37

## 2018-07-18 RX ADMIN — HYDROMORPHONE HYDROCHLORIDE 2 MILLIGRAM(S): 2 INJECTION INTRAMUSCULAR; INTRAVENOUS; SUBCUTANEOUS at 16:06

## 2018-07-18 RX ADMIN — GABAPENTIN 300 MILLIGRAM(S): 400 CAPSULE ORAL at 21:29

## 2018-07-18 RX ADMIN — MINOCYCLINE HYDROCHLORIDE 100 MILLIGRAM(S): 45 TABLET, EXTENDED RELEASE ORAL at 18:44

## 2018-07-18 RX ADMIN — POLYETHYLENE GLYCOL 3350 17 GRAM(S): 17 POWDER, FOR SOLUTION ORAL at 05:45

## 2018-07-18 RX ADMIN — Medication 650 MILLIGRAM(S): at 06:45

## 2018-07-18 RX ADMIN — HYDROMORPHONE HYDROCHLORIDE 0.5 MILLIGRAM(S): 2 INJECTION INTRAMUSCULAR; INTRAVENOUS; SUBCUTANEOUS at 10:20

## 2018-07-18 RX ADMIN — BUDESONIDE AND FORMOTEROL FUMARATE DIHYDRATE 2 PUFF(S): 160; 4.5 AEROSOL RESPIRATORY (INHALATION) at 05:43

## 2018-07-18 RX ADMIN — SIMVASTATIN 20 MILLIGRAM(S): 20 TABLET, FILM COATED ORAL at 21:29

## 2018-07-18 RX ADMIN — HYDROMORPHONE HYDROCHLORIDE 4 MILLIGRAM(S): 2 INJECTION INTRAMUSCULAR; INTRAVENOUS; SUBCUTANEOUS at 12:19

## 2018-07-18 RX ADMIN — Medication 650 MILLIGRAM(S): at 05:45

## 2018-07-18 RX ADMIN — HYDROMORPHONE HYDROCHLORIDE 2 MILLIGRAM(S): 2 INJECTION INTRAMUSCULAR; INTRAVENOUS; SUBCUTANEOUS at 17:06

## 2018-07-18 RX ADMIN — Medication 1 TABLET(S): at 14:16

## 2018-07-18 RX ADMIN — Medication 650 MILLIGRAM(S): at 22:51

## 2018-07-18 RX ADMIN — Medication 1 TABLET(S): at 11:37

## 2018-07-18 RX ADMIN — Medication 650 MILLIGRAM(S): at 12:36

## 2018-07-18 RX ADMIN — HYDROMORPHONE HYDROCHLORIDE 0.5 MILLIGRAM(S): 2 INJECTION INTRAMUSCULAR; INTRAVENOUS; SUBCUTANEOUS at 10:42

## 2018-07-18 RX ADMIN — Medication 12.5 MILLIGRAM(S): at 18:44

## 2018-07-18 RX ADMIN — Medication 1 TABLET(S): at 21:29

## 2018-07-18 RX ADMIN — TIOTROPIUM BROMIDE 1 CAPSULE(S): 18 CAPSULE ORAL; RESPIRATORY (INHALATION) at 11:41

## 2018-07-18 RX ADMIN — BUDESONIDE AND FORMOTEROL FUMARATE DIHYDRATE 2 PUFF(S): 160; 4.5 AEROSOL RESPIRATORY (INHALATION) at 18:47

## 2018-07-18 RX ADMIN — Medication 1 APPLICATION(S): at 22:32

## 2018-07-18 RX ADMIN — Medication 1 APPLICATION(S): at 18:55

## 2018-07-18 RX ADMIN — HYDROMORPHONE HYDROCHLORIDE 0.5 MILLIGRAM(S): 2 INJECTION INTRAMUSCULAR; INTRAVENOUS; SUBCUTANEOUS at 22:16

## 2018-07-18 RX ADMIN — Medication 650 MILLIGRAM(S): at 11:36

## 2018-07-18 RX ADMIN — PANTOPRAZOLE SODIUM 40 MILLIGRAM(S): 20 TABLET, DELAYED RELEASE ORAL at 05:46

## 2018-07-18 RX ADMIN — Medication 1 TABLET(S): at 05:44

## 2018-07-18 RX ADMIN — LORATADINE 10 MILLIGRAM(S): 10 TABLET ORAL at 11:37

## 2018-07-18 RX ADMIN — Medication 500 MILLIGRAM(S): at 11:37

## 2018-07-18 RX ADMIN — POLYETHYLENE GLYCOL 3350 17 GRAM(S): 17 POWDER, FOR SOLUTION ORAL at 18:47

## 2018-07-18 RX ADMIN — HYDROMORPHONE HYDROCHLORIDE 4 MILLIGRAM(S): 2 INJECTION INTRAMUSCULAR; INTRAVENOUS; SUBCUTANEOUS at 13:19

## 2018-07-18 NOTE — CHART NOTE - NSCHARTNOTEFT_GEN_A_CORE
MEDICINE [facility]  Patient is a 77 year old female who presents with a chief complaint of fever and lethargy. (07 Jun 2018 22:19)      Event Summary:  Called by RN to report patient with history of TAVR and on Coumadin but no Coumadin ordered for tonight.      COAGULATION PANEL:  PT/INR - ( 18 Jul 2018 06:58 )   PT: 26.3 sec;   INR: 2.39 ratio         HPI:  Patient is a 77 year old female with a PMHx of CAD (S/P HERBERT to LAD in 2016), severe AS (S/P TAVR in 2016), bradycardia (S/P PPM 12/17 - [place] [employer] Model [id number]), MVR, DVT / PE (on Coumadin) and recent admission (3/9/2018 to 6/6/2018) for total knee replacement complicated by joint infection S/P explantation with multiple wounds managed by plastic surgery who presented from rehab for evaluation of fever, lethargy and nausea. (07 Jun 2018 22:19)      PLAN: Coumadin dosing  - Coumadin 1mg x1 ordered  - Discussed with Dr. Murphy  - Continue to monitor daily INR  - Will continue to monitor closely  - Primary team to follow up in AM      #[id number]  Mercedes Cabrera PA-C  Medicine Department

## 2018-07-18 NOTE — PROGRESS NOTE ADULT - ASSESSMENT
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 Pt completed 10 days meropenem.     # abd wound, multiple bl thigh wounds  pt with new R thigh lesion, sp punch biopsy by derm 7/11, positive for urticaria, not consistent with PG  prior biopsies were not consistent with pyoderma gangrenosum, however, pt continues to develop new ulcers  6/22 left thigh and left arm punch biopsies consistent with urticaria as well. evaluated by allergy.  tissue culture grew coag neg staph likely colonized  IAs multiple biopsies have not been pos for PG,  7/13 sp intralesional kenalog lesion on right thigh, which did not show improvement after 3 days  continue vac to abdomen, rt Knee  appreciate wound care recs, daily dressing changes, wound vac changes every other day  d/w Dr Stinson- who will be seeing pt today and leave recs  Trial of steroids pending        # Pain control  we have not been able to secure pain management consult, nursing manager is aware  Mr Rdz and Son Thony ask to try to decrease dilaudid  pt still with pain that prohibits dressing changes, so will cont dilaudid 2mg q4 PRN for severe pain  try IV tylenol 1g qd PRN for moderate pain  try to avoid IV dilaudid, but okay to give 0.5mg IV before wound changes if necessary  yesterday tramadol 25mg given for pain, can cont this in efforts to try to avoid dilaudid  gabapentin 300mg tid  bowel regimen    # polymyalgia rheumatica  steroids have been on hold to allow for wound healing  no signs of active rheum dz, evaluated by rheum 6/29    # TKR wound-mrsa  chronic minocycline suppression  appreciate ID recs  family asks for second ID opinion    # chr Anemia  stable, monitor h/h closely  appreciate heme recs    # S/P TAVR (transcatheter aortic valve replacement)  daily coumadin dosing, daily INR    # adjustment disorder with depressed mood  xanax prn        plan of care dw patient and aid at bedside

## 2018-07-18 NOTE — PROGRESS NOTE ADULT - SUBJECTIVE AND OBJECTIVE BOX
Plastic Surgery Progress Note (pg [facility]: [id number], [facility]: [phone])    SUBJECTIVE:  The patient was seen and examined. No acute events overnight.    OBJECTIVE:     ** VITAL SIGNS / I&O's **    Vital Signs Last 24 Hrs  T(C): 36.6 (18 Jul 2018 10:34), Max: 37 (17 Jul 2018 16:31)  T(F): 97.8 (18 Jul 2018 10:34), Max: 98.6 (17 Jul 2018 16:31)  HR: 67 (18 Jul 2018 10:34) (66 - 74)  BP: 115/74 (18 Jul 2018 10:34) (110/69 - 132/68)  BP(mean): --  RR: 18 (18 Jul 2018 10:34) (18 - 20)  SpO2: 96% (18 Jul 2018 03:33) (95% - 96%)      17 Jul 2018 07:01  -  18 Jul 2018 07:00  --------------------------------------------------------  IN:    Oral Fluid: 720 mL  Total IN: 720 mL    OUT:    Voided: 500 mL  Total OUT: 500 mL    Total NET: 220 mL          ** PHYSICAL EXAM **    -- CONSTITUTIONAL: Alert, NAD   -- ABDOMEN: central abd VAC intact and set to suction.   -- EXTREMITIES: W2D dressing on R lateral thigh, R knee vac is off irrigation, both vacs are holding suction. R medial thigh- promogran dressing in place      ** LABS **            PT/INR - ( 18 Jul 2018 06:58 )   PT: 26.3 sec;   INR: 2.39 ratio           CAPILLARY BLOOD GLUCOSE                    ** MEDICATIONS **  MEDICATIONS  (STANDING):  ascorbic acid 500 milliGRAM(s) Oral daily  aspirin enteric coated 81 milliGRAM(s) Oral daily  betamethasone valerate 0.1% Ointment 1 Application(s) Topical two times a day  buDESOnide 160 MICROgram(s)/formoterol 4.5 MICROgram(s) Inhaler 2 Puff(s) Inhalation two times a day  calcium carbonate 1250 mG + Vitamin D (OsCal 500 + D) 1 Tablet(s) Oral three times a day  Dakins Solution - 1/4 Strength 1 Application(s) Topical two times a day  folic acid 1 milliGRAM(s) Oral daily  gabapentin 300 milliGRAM(s) Oral three times a day  loratadine 10 milliGRAM(s) Oral daily  melatonin 3 milliGRAM(s) Oral at bedtime  metoprolol tartrate 12.5 milliGRAM(s) Oral two times a day  minocycline 100 milliGRAM(s) Oral two times a day  multivitamin 1 Tablet(s) Oral daily  pantoprazole    Tablet 40 milliGRAM(s) Oral before breakfast  polyethylene glycol 3350 17 Gram(s) Oral two times a day  simvastatin 20 milliGRAM(s) Oral at bedtime  tiotropium 18 MICROgram(s) Capsule 1 Capsule(s) Inhalation daily    MEDICATIONS  (PRN):  acetaminophen   Tablet. 650 milliGRAM(s) Oral every 6 hours PRN Mild Pain (1 - 3)  ALPRAZolam 0.25 milliGRAM(s) Oral daily PRN prior to dressing change ---anxiety  bisacodyl Suppository 10 milliGRAM(s) Rectal daily PRN Constipation  bismuth subsalicylate Liquid 30 milliLiter(s) Oral every 6 hours PRN Cramping/abd pain  HYDROmorphone   Tablet 2 milliGRAM(s) Oral every 4 hours PRN Severe Pain (7 - 10)  HYDROmorphone  Injectable 0.5 milliGRAM(s) IV Push every 12 hours PRN please give before wound changes  magnesium hydroxide Suspension 30 milliLiter(s) Oral daily PRN Constipation  senna 2 Tablet(s) Oral at bedtime PRN Constipation  simethicone 80 milliGRAM(s) Chew two times a day PRN Gas

## 2018-07-18 NOTE — CONSULT NOTE ADULT - PROVIDER SPECIALTY LIST ADULT
Allergy/Immunology
Dermatology
Dermatology
Heme/Onc
Infectious Disease
Pain Medicine
Surgery
Wound Care
Infectious Disease
Orthopedics
Rheumatology

## 2018-07-18 NOTE — PROGRESS NOTE ADULT - ASSESSMENT
76 yo F with a PMH of CAD s/p HERBERT, AS s/p TAVR, bradycardia s/p PM, MVR, DVT/PE, and s/p TKR who has recurrent, deep skin ulcers of unknown etiology despite multiple skin biopsies and tissue cultures.    1. Skin ulcers on the lower abdomen and lower extremities. Differential diagnosis includes pyoderma gangrenosum vs. urticaria with self-inflicted skin injury  - s/p numerous skin biopsies, etiology remains unclear as the biopsies, including the biopsy from last week, have been non-diagnostic. However, the last three biopsies have shown findings consistent with urticaria. One previous biopsy showed findings of an abscess, which could be consistent with pyoderma gangrenosum  - No sign of cutaneous infection on prior biopsies.   - One lesion on the R lateral thigh was with treated with ILK 40 without improvement  - Given findings of urticaria and stable/improving wounds on the wound vac, would recommend trimming of patients nails as there is chance she is using them to dig into her skin. Would also place wound vacs/special dressings on the new lesions and insure only medical personnel are manipulating the dressings and wound vac.  - Patient was cleared by infectious disease for prednisone use. Pyoderma gangrenosum remains on the differential diagnosis. If there is no other contraindication, start empiric Prednisone 50 mg QD. Response or lack of response will be very informative in establishing a diagnosis of PG.   - Patient was seen and discussed with Dr. Diaz. Will follow along with you. 76 yo F with a PMH of CAD s/p HERBERT, AS s/p TAVR, bradycardia s/p PM, MVR, DVT/PE, and s/p TKR who has recurrent, deep skin ulcers of unknown etiology despite multiple skin biopsies and tissue cultures.    1. Skin ulcers on the lower abdomen and lower extremities. Differential diagnosis includes pyoderma gangrenosum vs. urticaria with excoriatilon vs. atypical mycobacteria  - s/p numerous skin biopsies, etiology remains unclear as the biopsies, including the biopsy from last week, have been non-diagnostic. However, the last three biopsies have shown findings consistent with urticaria. One previous biopsy showed findings of an abscess, which could be consistent with pyoderma gangrenosum  - No sign of cutaneous infection on prior biopsies. Will check with microbiology to ensure tissue cultures for mycobacteria were done properly.  - One lesion on the R lateral thigh was with treated with ILK 40 without improvement  - Given findings of urticaria and stable/improving wounds on the wound vac, would recommend trimming of patients nails as there is chance she is using them to dig into her skin. Would also place wound vacs/special dressings on the new lesions and insure only medical personnel are manipulating the dressings and wound vac.  - Patient was cleared by infectious disease for prednisone use. Pyoderma gangrenosum remains on the differential diagnosis. If there is no other contraindication, start empiric Prednisone 50 mg QD. Response or lack of response will be very informative in establishing a diagnosis of PG.   - Patient was seen and discussed with Dr. iDaz. Will follow along with you. 76 yo F with a PMH of CAD s/p HERBERT, AS s/p TAVR, bradycardia s/p PM, MVR, DVT/PE, and s/p TKR who has recurrent, deep skin ulcers of unknown etiology despite multiple skin biopsies and tissue cultures.    1. Skin ulcers on the lower abdomen and lower extremities. Differential diagnosis includes pyoderma gangrenosum vs. urticaria with excoriatilon vs. atypical mycobacteria  - s/p numerous skin biopsies, etiology remains unclear as the biopsies, including the biopsy from last week, have been non-diagnostic. However, the last three biopsies have shown findings consistent with urticaria. One previous biopsy showed findings of an abscess, which could be consistent with pyoderma gangrenosum  - No sign of cutaneous infection on prior biopsies. Will check with microbiology to ensure tissue cultures for mycobacteria were done properly.  - Although unlikely, consider hypercoagulable state work-up.   - One lesion on the R lateral thigh was with treated with ILK 40 without improvement  - Given findings of urticaria and stable/improving wounds on the wound vac, would recommend trimming of patients nails as there is chance she is using them to dig into her skin. Would also place wound vacs/special dressings on the new lesions and insure only medical personnel are manipulating the dressings and wound vac.  - Patient was cleared by infectious disease for prednisone use. Pyoderma gangrenosum remains on the differential diagnosis. If there is no other contraindication, start empiric Prednisone 50 mg QD. Response or lack of response will be very informative in establishing a diagnosis of PG.   - Patient was seen and discussed with Dr. Diaz. Will follow along with you.

## 2018-07-18 NOTE — PROGRESS NOTE ADULT - SUBJECTIVE AND OBJECTIVE BOX
SUBJECTIVE: Pt seen, chart reviewed.  Pt's daughter & HHA at bedside.  Pt was premedicated.   Worsening pain-- c/o burning pain RLE.  Increase in Neurontin helpful  Pt seen w/Plastics resident.  Pt overall improving otherwise- abx for appendicitis  no definitive dx for ulcers.  needs f/u w/ rheum/ derm/ maybe hem/onc for spontaneous necrotic hemorraghic wounds w/ fat necrosis  New path report noted from 7/11 bx    No odor, redness, warmth, f/c/s noted  drainage managed by dressings	    ROS skin / msk see HPI   all other systems negative    aspirin enteric coated 81 milliGRAM(s) Oral daily  minocycline 100 milliGRAM(s) Oral two times a day      Physical Exam:  Vital Signs Last 24 Hrs  T(C): 36.6 (18 Jul 2018 10:34), Max: 36.8 (17 Jul 2018 21:41)  T(F): 97.8 (18 Jul 2018 10:34), Max: 98.2 (17 Jul 2018 21:41)  HR: 67 (18 Jul 2018 10:34) (66 - 69)  BP: 115/74 (18 Jul 2018 10:34) (110/69 - 119/78)  BP(mean): --  RR: 18 (18 Jul 2018 10:34) (18 - 18)  SpO2: 96% (18 Jul 2018 03:33) (96% - 96%)    General Appearance:    NAD /  A&Ox3  MO/ frail/ Disheveled   Envella    Musculoskeletal/Vascular:  BLE edema  BLE equally warm no acute ischemia noted  no deformities/ contractures  Rt knee wound per plastics    Skin:  dry, frail,  ecchymosis w/o hematoma    pt is tender when just gently touching skin- therefore pt was premedicated prior to assessment    Multiple wounds- see measurements in A&I sections- placed by wound PT    RLQ abdomen wound w/ moist & granular tissue no odor, kimber     Rt anterior thigh wound w/ moist & granular tissue    Rt superior posterolateral w/ new upper thigh 2 small wounds opened w/ serosanguinous drainage w/ nonviable tissue    Rt inferior posterolateral wound   moist & granular tissue w/ necrotic & nonviable tissue medially  full thickness in area of recently noted new firmness    (+) serosanguinous drainage & tender w/o odor  No erythema, increased warmth, induration, fluctuance    Lt upper lateral thigh wound   full thickness ulcers w/ moist & granular tissue   (+)serosanguinous  drainage  (+)tender w/o malodor  No erythema, increased warmth, induration, fluctuance    Lt lateral posterior thigh (inferior) superficial w/ 2 small partial thickness  fibrinous exudate  in 2cm x 1cm x 0.1cm  (+)serosanguinous drainage & tender  No erythema,  odor, increased warmth, induration, fluctuance    Lt  Upper medial inner thigh   3 wounds/ 2 partial thickness & 1 full thickness   moist &granular w/ fibrinous exudate  (+)serosanguinous drainage  (+)tender w/o odor  No erythema, increased warmth, induration, fluctuance          LABS:    PT/INR - ( 18 Jul 2018 06:58 )   PT: 26.3 sec;   INR: 2.39 ratio             RADIOLOGY & ADDITIONAL STUDIES:  Surgical Pathology Report (07.11.18 @ 16:30)    Surgical Pathology Report:   ACCESSION No:  [id number]    [id number]MA                     1    Surgical Final Report  Final Diagnosis  Right thigh, punch biopsy  - Sparse perivascular lymphocytic infiltrate with few  scattered neutrophils, eosinophils, and mast cells (see note).    Note: Multiple levels are examined. Slight dermal fibrosis is  noted. No fungal organisms are seen with a PAS/D stain;  correlation with cultures is recommended. The findings are not  specific; however, the histopathologic  differential diagnosis includes urticaria. Clinical correlation  is required.    Verified by: Romain José M.D., Dermatopathologist  (Electronic Signature)  Reported on: 07/16/18 12:50 EDT,[address] [facility], [address], [place] [address]  _________________________________________________________________    Clinical History  77-year-old female history of CAD, AS, MVR p/w ulcers on LE.  Healing with wound vac. Previous biopsy x5.  Rule out PG vs. infection vs. factitious    Specimen(s) Submitted  1     Punch skin right thigh    Gross Description  The specimen is received in formalin and the specimen container  is labeled: Left thigh. It consists of a 0.4 x 0.3 cm diameter  punch biopsy of skin taken to a depth of 0.5 cm. The epidermis is  tan-white and soft. The specimen is entirely submitted in one  cassette.    In addition to other data that may appear on the specimen  container, the label has been inspected to confirm the presence  of the patient's name and date of birth.    ALINE 07/11/18 19:37

## 2018-07-18 NOTE — PROGRESS NOTE ADULT - ATTENDING COMMENTS
Patient seen and examined  If indicated by Derm and Wound care team okay to start steroids  Cont VAC to abd and knee, medial thigh wound cont cornelia and hydrofoam

## 2018-07-18 NOTE — PROGRESS NOTE ADULT - ASSESSMENT
no plan for orthopedic surgical intervention per family and patient preference  vac and wound care per PRS and wound care teams  PO abx per ID team, minocycline  encourage patient to spend majority of bed oob in bedside chair as able, has not been oob recently, PLEASE HAVE PT COME BY TO MOBILIZE PATIENT, she must remain 50% wb on the RLE and NO KNEE MOTION allowed, knee immobilizer if oob  coumadin, inr goal 2-3  continue to optimize nutrition  agree with pain team consult (pending), needs to switch to PO regimen  continue to involve psych given depression symptoms  orthopedic issues are stable for discharge to rehab, please make sure patient and family is okay with discharge before initiating planning process  prefer to avoid systemic steroids if possible unless cleared by prs team given complex wounds, defer to prs on this

## 2018-07-18 NOTE — PROGRESS NOTE ADULT - ASSESSMENT
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 with recent admission from (3/9/2018 to 6/6/2018) for TKR c/b joint infection s/p explantation with multiple wounds from rehab for evaluation of fever, lethargy, and nausea at rehab found to be febrile here with evidence of appendicitis on CT abdomen, non-operative candidate, now resolved      Multiple wounds Abdomen Bilateral thighs -no gross signs of infection    VAC to RLQ abdominal wound and & Rt knee  Rt anterior thigh- Breanne dressing  Rt lateral thigh- Dakins  Multiple smaller wounds- Aquacel Ag dressings- dressing order placed    Rt knee w/ skin dehiscence - per plastics and ortho  F/u Derm/ Rheum/ Hem  Abx per Medicine/ ID  Gen SUrg/ Plastics/ Ortho consults appreciated  con't offloading  con't Nuturition  con't Pericare  Con't per Medicine  F/u as outpatient in Wound [facility] [address] [facility] [phone]  d/w Medicine & Plastics team & d/w attending  Janki Cramer PA-C [id number]  I spent  35 minutes w/ this pt of which more than 50% of the time was spent counseling & coordinating care of this pt.

## 2018-07-18 NOTE — PROGRESS NOTE ADULT - ATTENDING COMMENTS
Status discussed with patient and daughter at bedside, inc discussion regarding possible use of steroids  patient remains very intolerant of dressing changes  current notes reviewed

## 2018-07-18 NOTE — PROGRESS NOTE ADULT - SUBJECTIVE AND OBJECTIVE BOX
INTERVAL HPI/OVERNIGHT EVENTS:  No acute events overnight. No new lesions. Lesions remain painful.    HPI:    76 yo F with a PMH of CAD s/p HERBERT, AS s/p TAVR, bradycardia s/p PM, MVR, DVT/PE, and s/p TKR who presented on 6/7 with appendicitis.  Dermatology has been following her for ulcers since 5/23/18 during her previous admission. We are re-consulted for a new lesion on the R thigh. Painful. Noticed on Monday. Growing. Worried that this too will ulcerate. Otherwise well.    When Pt was initially consulted in late May for R knee ulcer, the ulcer was already debrided and it was difficult to understand how the ulcer initially arose. At the time of the initial, pt already had wound VAC placed in lower abdomen and R anterior thigh. Over the course of the month, pt developed new ulcers and lesions.    Biopsies were obtained from ulcers on R medial knee on May 23 (debrided prior to biopsy) and L inner thigh on Jun 13 (new ulcer) along with tissue cultures. The biopsies showed mixed inflammatory patterns. Bacterial tissue cx's were reviewed by ID, and they were thought to be results of colonization. Mycobacteria and fungal cx were negative. Another tissue cx obtained from subcutaneous tissue from R lateral thigh on Jun 18 to rule out atypical mycobacteria has been negative.    Another biopsy from L wrist and L thigh (pt's daughter reported the thigh lesion appeared to be how all the ulcers started) on Jun 22 revealed urticaria and excoriation.      Seen with her aid, Lissa.     MEDICATIONS  (STANDING):  ALPRAZolam 0.25 milliGRAM(s) Oral once  ascorbic acid 500 milliGRAM(s) Oral daily  aspirin enteric coated 81 milliGRAM(s) Oral daily  betamethasone valerate 0.1% Ointment 1 Application(s) Topical two times a day  buDESOnide 160 MICROgram(s)/formoterol 4.5 MICROgram(s) Inhaler 2 Puff(s) Inhalation two times a day  calcium carbonate 1250 mG + Vitamin D (OsCal 500 + D) 1 Tablet(s) Oral three times a day  Dakins Solution - 1/4 Strength 1 Application(s) Topical two times a day  folic acid 1 milliGRAM(s) Oral daily  gabapentin 300 milliGRAM(s) Oral three times a day  loratadine 10 milliGRAM(s) Oral daily  melatonin 3 milliGRAM(s) Oral at bedtime  metoprolol tartrate 12.5 milliGRAM(s) Oral two times a day  minocycline 100 milliGRAM(s) Oral two times a day  multivitamin 1 Tablet(s) Oral daily  pantoprazole    Tablet 40 milliGRAM(s) Oral before breakfast  polyethylene glycol 3350 17 Gram(s) Oral two times a day  simvastatin 20 milliGRAM(s) Oral at bedtime  tiotropium 18 MICROgram(s) Capsule 1 Capsule(s) Inhalation daily  warfarin 1 milliGRAM(s) Oral once    MEDICATIONS  (PRN):  acetaminophen   Tablet. 650 milliGRAM(s) Oral every 6 hours PRN Mild Pain (1 - 3)  bisacodyl Suppository 10 milliGRAM(s) Rectal daily PRN Constipation  bismuth subsalicylate Liquid 30 milliLiter(s) Oral every 6 hours PRN Cramping/abd pain  HYDROmorphone   Tablet 4 milliGRAM(s) Oral every 4 hours PRN Severe Pain (7 - 10)  HYDROmorphone   Tablet 2 milliGRAM(s) Oral every 3 hours PRN Moderate Pain (4 - 6)  magnesium hydroxide Suspension 30 milliLiter(s) Oral daily PRN Constipation  senna 2 Tablet(s) Oral at bedtime PRN Constipation  simethicone 80 milliGRAM(s) Chew two times a day PRN Gas      Allergies    amoxicillin (Other)    Intolerances    IV Contrast (Flushing (Skin); Nausea)      REVIEW OF SYSTEMS      General: no fevers/chills, no NS	    Skin: see HPI  	  Ophthalmologic: no eye pain or change in vision    Genitourinary: no dysuria or hematuria    Musculoskeletal: no joint pains or weakness	    Neurological:no weakness or tingling          Vital Signs Last 24 Hrs  T(C): 36.4 (13 Jul 2018 09:02), Max: 37.1 (12 Jul 2018 17:38)  T(F): 97.6 (13 Jul 2018 09:02), Max: 98.7 (12 Jul 2018 17:38)  HR: 75 (13 Jul 2018 09:02) (75 - 86)  BP: 109/71 (13 Jul 2018 09:02) (109/71 - 115/78)  BP(mean): --  RR: 18 (13 Jul 2018 09:02) (18 - 18)  SpO2: 92% (13 Jul 2018 09:02) (92% - 98%)    PHYSICAL EXAM:   The patient was alert and oriented X 3, well nourished, and in no  apparent distress.  There was no visible lymphadenopathy.  Conjunctiva were non injected  There was no clubbing or edema of extremities.  There was no hyperhidrosis or bromhidrosis.    Of note on skin exam:   R lateral thigh with 4 cm linear, deep, well circumscribed ulcer and 3 cm, well circumscribed, full thickness ulcer with fibrinous base and minimal surrounding erythema.    L lateral thigh: DEEP 2 cm oval ulcer, well-demarcated with undermining and surrounding erythema    Wound vac on lower abdomen, R thigh. L thigh    LABS:                        8.9    10.36 )-----------( 291      ( 13 Jul 2018 09:30 )             28.7     07-13    139  |  98  |  27<H>  ----------------------------<  124<H>  4.3   |  29  |  0.87    Ca    10.0      13 Jul 2018 07:17      PT/INR - ( 13 Jul 2018 08:02 )   PT: 30.3 sec;   INR: 2.63 ratio         PTT - ( 12 Jul 2018 08:18 )  PTT:39.1 sec      RADIOLOGY & ADDITIONAL TESTS:

## 2018-07-18 NOTE — PROGRESS NOTE ADULT - ASSESSMENT
A/P: s/p Right total knee insertion of spacer, irrigation and debridement, complex wound closure 3/23; readmitted 6/8 for appendicitis    - D/w primary team regarding pain consult  - cont vac/dressing changes to R knee, thigh, and abdomen M/W/F  - remainder of wounds per wound care/derm  - ok for discharge from PRS perspective    Plastic Surgery (pg TOYA: [id number], NS: [phone])

## 2018-07-18 NOTE — CONSULT NOTE ADULT - ASSESSMENT
Morbid obesity.RA,OA,asthma,PMR,s/p TAVR ,PPM, and ? MVR  Failed rt TKR, explant, strep was initial pathogen, MRSA more recent pathogen  S/P joint explant.S/P plastics coverage with wound breakdown.  Multiple wounds.Admitted 6/7  with low grade fever and appendicitis seen on CT scan..S/P 1 week of meropenem.No surgery planned.  PG  has been a concern, s/p 2 biopsies of skin, path not suggestive.Derm does not feel she has PG.  Her AFB knee cultures in December were negative, May 23rd tissue AFB culture negative as well.All 3 AFB smears were negative. Morbid obesity.RA,OA,asthma,PMR,s/p TAVR ,PPM, and ? MVR  Failed rt TKR, explant, strep was initial pathogen, MRSA more recent pathogen  S/P joint explant.S/P plastics coverage with wound breakdown.  Multiple wounds.Admitted 6/7  with low grade fever and appendicitis seen on CT scan..S/P 1 week of meropenem.No surgery planned.  PG  has been a concern, s/p 2 biopsies of skin, path not suggestive.Derm does not feel she has PG.  Her AFB knee cultures in December were negative, May 23rd tissue AFB culture negative as well.All 3 AFB smears were negative.      Skin ulcers on the lower abdomen and lower extremities. Differential diagnosis includes pyoderma gangrenosum vs. urticaria with self-inflicted skin injury. Other thoughts include Sweets ( biopsy does not show this) or calciphylaxis (but not the right patient for this) or pannicluitis ( such as Justin Kennedy) - but not proven on biopsy or perhaps some other casue for fat necroiss. In discussing with Dr Mace we were wondering if this could be related to the minocycline somehow- but we haven't seen prior reports of such but will invstigate further.  - s/p numerous skin biopsies, etiology remains unclear as the biopsies, including the biopsy from last week, have been non-diagnostic. However, the last three biopsies have shown findings consistent with urticaria. One previous biopsy showed findings of an abscess, which could be consistent with pyoderma gangrenosum  - No sign of cutaneous infection on prior biopsies.   - ALl cultures including AFB and fungal are negative so far.    I will inspect the wounds will physical therapy at the next vac change    I have no objection to steroid trial based on what I've seen if orthopedics are ok with it.

## 2018-07-18 NOTE — CONSULT NOTE ADULT - SUBJECTIVE AND OBJECTIVE BOX
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    Patient was recently admitted from 4/9/18 - 6/6/18. Patient was initially presented for right total knee spacer exchange and I&D with complex wound closure. Previously had a spacer exchanged performed on 3/9 and then was discharged to rehab. Patient then returned on 4/9 with fever and concern for sepsis, found to have positive cultures from knee for MRSA as well as multifocal pneumonia. During admission, patient completed course of Daptomycin for MRSA prosthesis infection and was also treated for multifocal PNA with ?aztreonam. Patient had extensive wound care, followed by Surgery/Plastics/Orthopedics/Id and was discharged to rehab on 6/6 with suppressive minocycline. Admission complicated by lesion over thigh and lower pannus requiring wound vacs.  UA On 6/5 positive, but no culture sent and no antibiotics started.     Today, patient presents from rehab with fevers and lethargy shortly after discharge. She reports that she otherwise feels well, denies any chills, chest pain, cough, shortness of breath, nausea, vomiting, abdominal pain or diarrhea. Reports that she had pain when her wounds were being dressed, but does not have any pain at the moment. Reports feeling well, denies any other issues.     In the ED, Tmax 100.6, HR 85,  /62, O2 98% on 2L NC. Labs showed mild leukocytosis with WBC 13.8, stable anemia Hgb 9.6, and normal platelets 319. Metabolic panel overall unremarkable with Cr 0.89. LFTs unremarkable. VBG with lactate 1.3. UA again positive with >50 WBCs, few bacteria, turbid appearance, and large LE.     Patient had a CT abdomen/pelvis that showed evidence of possible distal appendicitis. Evaluated by Surgery and family declining operative intervention due to high risk. Plastics to aid with wound management, to apply wound vac again in AM to thigh and pannus. Patient not felt to have evidence of wound infection. (07 Jun 2018 22:19)      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  Artificial cardiac pacemaker: 12/25/17  H/O cardiac catheterization: 11/29/16 HERBERT placed on LAD  H/O endoscopy: with dilatation of esophageal stricture 2013  S/P cataract surgery: x2; 3-4yr ago  S/P gastroplasty: 28 yrs ago  S/P cholecystectomy: more than 15 years ago  S/P total knee replacement: left, 15yr ago  S/P total knee replacement: right, 12yr ago  S/P hysterectomy: 24yr ago      Social history:   Social History:    Marital Status:   Occupation:   Lives with:     Substance Use :  Tobacco Usage:  (   ) never smoked   (   ) former smoker   (   ) current smoker  (     ) pack year  (        ) last tobacco use date  Alcohol Usage:  Travel:  Pets:          FAMILY HISTORY:  No pertinent family history in first degree relatives      REVIEW OF SYSTEMS  General:	Denies any malaise fatigue or chills. Fevers absent    Skin:No rash  	  Ophthalmologic:Denies any visual complaints,discharge redness or photophobia  	  ENMT:No nasal discharge,headache,sinus congestion or throat pain.No dental complaints    Respiratory and Thorax:No cough,sputum or chest pain.Denies shortness of breath  	  Cardiovascular:	No chest pain,palpitaions or dizziness    Gastrointestinal:	NO nausea,abdominal pain or diarrhea.    Genitourinary:	No dysuria,frequency. No flank pain    Musculoskeletal:	No joint swelling or pain.No weakness    Neurological:No confusion,diziness.No extremity weakness.No bladder or bowel incontinence	    Psychiatric:No delusions or hallucinations	    Hematology/Lymphatics:	No LN swelling.No gum bleeding     Endocrine:	No recent weight gain or loss.No abnormal heat/cold intolerance    Allergic/Immunologic:	No hives or rash     Allergies    amoxicillin (Other)    Intolerances    IV Contrast (Flushing (Skin); Nausea)      Antimicrobials:    minocycline 100 milliGRAM(s) Oral two times a day      MEDICATIONS  (STANDING):  ascorbic acid 500 milliGRAM(s) Oral daily  aspirin enteric coated 81 milliGRAM(s) Oral daily  betamethasone valerate 0.1% Ointment 1 Application(s) Topical two times a day  buDESOnide 160 MICROgram(s)/formoterol 4.5 MICROgram(s) Inhaler 2 Puff(s) Inhalation two times a day  calcium carbonate 1250 mG + Vitamin D (OsCal 500 + D) 1 Tablet(s) Oral three times a day  Dakins Solution - 1/4 Strength 1 Application(s) Topical two times a day  folic acid 1 milliGRAM(s) Oral daily  gabapentin 300 milliGRAM(s) Oral three times a day  loratadine 10 milliGRAM(s) Oral daily  melatonin 3 milliGRAM(s) Oral at bedtime  metoprolol tartrate 12.5 milliGRAM(s) Oral two times a day  minocycline 100 milliGRAM(s) Oral two times a day  multivitamin 1 Tablet(s) Oral daily  pantoprazole    Tablet 40 milliGRAM(s) Oral before breakfast  polyethylene glycol 3350 17 Gram(s) Oral two times a day  simvastatin 20 milliGRAM(s) Oral at bedtime  tiotropium 18 MICROgram(s) Capsule 1 Capsule(s) Inhalation daily    MEDICATIONS  (PRN):  acetaminophen   Tablet. 650 milliGRAM(s) Oral every 6 hours PRN Mild Pain (1 - 3)  ALPRAZolam 0.25 milliGRAM(s) Oral daily PRN prior to dressing change ---anxiety  bisacodyl Suppository 10 milliGRAM(s) Rectal daily PRN Constipation  bismuth subsalicylate Liquid 30 milliLiter(s) Oral every 6 hours PRN Cramping/abd pain  HYDROmorphone   Tablet 2 milliGRAM(s) Oral every 4 hours PRN Severe Pain (7 - 10)  HYDROmorphone  Injectable 0.5 milliGRAM(s) IV Push every 12 hours PRN please give before wound changes  magnesium hydroxide Suspension 30 milliLiter(s) Oral daily PRN Constipation  senna 2 Tablet(s) Oral at bedtime PRN Constipation  simethicone 80 milliGRAM(s) Chew two times a day PRN Gas        Vital Signs Last 24 Hrs  T(C): 36.6 (18 Jul 2018 10:34), Max: 37 (17 Jul 2018 16:31)  T(F): 97.8 (18 Jul 2018 10:34), Max: 98.6 (17 Jul 2018 16:31)  HR: 67 (18 Jul 2018 10:34) (66 - 74)  BP: 115/74 (18 Jul 2018 10:34) (110/69 - 132/68)  BP(mean): --  RR: 18 (18 Jul 2018 10:34) (18 - 20)  SpO2: 96% (18 Jul 2018 03:33) (95% - 96%)    PHYSICAL EXAM:Pleasant patient in no acute distress.      Constitutional:Comfortable.Awake and alert  No cachexia     Eyes:PERRL EOMI.NO discharge or conjunctival injection    ENMT:No sinus tenderness.No thrush.No pharyngeal exudate or erythema.Fair dental hygiene    Neck:Supple,No LN,no JVD      Respiratory:Good air entry bilaterally,CTA    Cardiovascular:S1 S2 wnl, No murmurs,rub or gallops    Gastrointestinal:Soft BS(+) no tenderness no masses ,No rebound or guarding    Genitourinary:No CVA tendereness     Rectal:    Extremities:No cyanosis,clubbing or edema.    Vascular:peripheral pulses felt    Neurological:AAO X 3,No grossly focal deficits    Skin:No rash     Lymph Nodes:No palpable LNs    Musculoskeletal:No joint swelling or LOM    Psychiatric:Affect normal.                                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    Patient was recently admitted from 4/9/18 - 6/6/18. Patient was initially presented for right total knee spacer exchange and I&D with complex wound closure. Previously had a spacer exchanged performed on 3/9 and then was discharged to rehab. Patient then returned on 4/9 with fever and concern for sepsis, found to have positive cultures from knee for MRSA as well as multifocal pneumonia. During admission, patient completed course of Daptomycin for MRSA prosthesis infection and was also treated for multifocal PNA with ?aztreonam. Patient had extensive wound care, followed by Surgery/Plastics/Orthopedics/Id and was discharged to rehab on 6/6 with suppressive minocycline. Admission complicated by lesion over thigh and lower pannus requiring wound vacs.  UA On 6/5 positive, but no culture sent and no antibiotics started.     Today, patient presents from rehab with fevers and lethargy shortly after discharge. She reports that she otherwise feels well, denies any chills, chest pain, cough, shortness of breath, nausea, vomiting, abdominal pain or diarrhea. Reports that she had pain when her wounds were being dressed, but does not have any pain at the moment. Reports feeling well, denies any other issues.     In the ED, Tmax 100.6, HR 85,  /62, O2 98% on 2L NC. Labs showed mild leukocytosis with WBC 13.8, stable anemia Hgb 9.6, and normal platelets 319. Metabolic panel overall unremarkable with Cr 0.89. LFTs unremarkable. VBG with lactate 1.3. UA again positive with >50 WBCs, few bacteria, turbid appearance, and large LE.     Patient had a CT abdomen/pelvis that showed evidence of possible distal appendicitis. Evaluated by Surgery and family declining operative intervention due to high risk. Plastics to aid with wound management, to apply wound vac again in AM to thigh and pannus. Patient not felt to have evidence of wound infection. (07 Jun 2018 22:19)    Hospital course:  pt felt to be a nonoperative candidate and was treated with 10 days of meropenem for the appendicitis  6/13 dermatology was consulted for new ulcers. skin biopsy was done Pathology was nonspecific- could not rule ut PG and cultures were negative  Pt remained on suppressive minocycline for the knee  derm , ortho, Physical therapy and ID continued to follow patient closely  6/22 - new lesions on the volar surface of the wrist  6/29 seen by rheumatology  7/3 - wound care comments:   dx needed wounds not from scratching.  Wounds of knee & BLE/ abdomin too deep and involved w/ extensive necrosis.  Pt too tender when dressings are changed.  she couldn't scratch herself enough to create these wounds.  Pt also has HHA at bedside who denied self inflicted wounds  7/16 derm notes: .No new lesions. Lesions are getting larger.   Today dermatology suggests trial of steroids. All serivce agreed but family asked fo r a second ID opinion      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  Artificial cardiac pacemaker: 12/25/17  H/O cardiac catheterization: 11/29/16 HERBERT placed on LAD  H/O endoscopy: with dilatation of esophageal stricture 2013  S/P cataract surgery: x2; 3-4yr ago  S/P gastroplasty: 28 yrs ago  S/P cholecystectomy: more than 15 years ago  S/P total knee replacement: left, 15yr ago  S/P total knee replacement: right, 12yr ago  S/P hysterectomy: 24yr ago      Social history:   Social History:    Marital Status:   Occupation:   Lives with:     Substance Use :  Tobacco Usage:  (   ) never smoked   (   ) former smoker   (   ) current smoker  (     ) pack year  (        ) last tobacco use date  Alcohol Usage:  Travel:  Pets:          FAMILY HISTORY:  No pertinent family history in first degree relatives      REVIEW OF SYSTEMS  General:	Denies any malaise fatigue or chills. Fevers absent    Skin:No rash  	  Ophthalmologic:Denies any visual complaints,discharge redness or photophobia  	  ENMT:No nasal discharge,headache,sinus congestion or throat pain.No dental complaints    Respiratory and Thorax:No cough,sputum or chest pain.Denies shortness of breath  	  Cardiovascular:	No chest pain,palpitaions or dizziness    Gastrointestinal:	NO nausea,abdominal pain or diarrhea.    Genitourinary:	No dysuria,frequency. No flank pain    Musculoskeletal:	No joint swelling or pain.No weakness    Neurological:No confusion,diziness.No extremity weakness.No bladder or bowel incontinence	    Psychiatric:No delusions or hallucinations	    Hematology/Lymphatics:	No LN swelling.No gum bleeding     Endocrine:	No recent weight gain or loss.No abnormal heat/cold intolerance    Allergic/Immunologic:	No hives or rash     Allergies    amoxicillin (Other)    Intolerances    IV Contrast (Flushing (Skin); Nausea)      Antimicrobials:    minocycline 100 milliGRAM(s) Oral two times a day      MEDICATIONS  (STANDING):  ascorbic acid 500 milliGRAM(s) Oral daily  aspirin enteric coated 81 milliGRAM(s) Oral daily  betamethasone valerate 0.1% Ointment 1 Application(s) Topical two times a day  buDESOnide 160 MICROgram(s)/formoterol 4.5 MICROgram(s) Inhaler 2 Puff(s) Inhalation two times a day  calcium carbonate 1250 mG + Vitamin D (OsCal 500 + D) 1 Tablet(s) Oral three times a day  Dakins Solution - 1/4 Strength 1 Application(s) Topical two times a day  folic acid 1 milliGRAM(s) Oral daily  gabapentin 300 milliGRAM(s) Oral three times a day  loratadine 10 milliGRAM(s) Oral daily  melatonin 3 milliGRAM(s) Oral at bedtime  metoprolol tartrate 12.5 milliGRAM(s) Oral two times a day  minocycline 100 milliGRAM(s) Oral two times a day  multivitamin 1 Tablet(s) Oral daily  pantoprazole    Tablet 40 milliGRAM(s) Oral before breakfast  polyethylene glycol 3350 17 Gram(s) Oral two times a day  simvastatin 20 milliGRAM(s) Oral at bedtime  tiotropium 18 MICROgram(s) Capsule 1 Capsule(s) Inhalation daily    MEDICATIONS  (PRN):  acetaminophen   Tablet. 650 milliGRAM(s) Oral every 6 hours PRN Mild Pain (1 - 3)  ALPRAZolam 0.25 milliGRAM(s) Oral daily PRN prior to dressing change ---anxiety  bisacodyl Suppository 10 milliGRAM(s) Rectal daily PRN Constipation  bismuth subsalicylate Liquid 30 milliLiter(s) Oral every 6 hours PRN Cramping/abd pain  HYDROmorphone   Tablet 2 milliGRAM(s) Oral every 4 hours PRN Severe Pain (7 - 10)  HYDROmorphone  Injectable 0.5 milliGRAM(s) IV Push every 12 hours PRN please give before wound changes  magnesium hydroxide Suspension 30 milliLiter(s) Oral daily PRN Constipation  senna 2 Tablet(s) Oral at bedtime PRN Constipation  simethicone 80 milliGRAM(s) Chew two times a day PRN Gas        Vital Signs Last 24 Hrs  T(C): 36.6 (18 Jul 2018 10:34), Max: 37 (17 Jul 2018 16:31)  T(F): 97.8 (18 Jul 2018 10:34), Max: 98.6 (17 Jul 2018 16:31)  HR: 67 (18 Jul 2018 10:34) (66 - 74)  BP: 115/74 (18 Jul 2018 10:34) (110/69 - 132/68)  BP(mean): --  RR: 18 (18 Jul 2018 10:34) (18 - 20)  SpO2: 96% (18 Jul 2018 03:33) (95% - 96%)    PHYSICAL EXAM:Pleasant patient in no acute distress.      Constitutional:Comfortable.Awake and alert  No cachexia     Eyes:PERRL EOMI.NO discharge or conjunctival injection    ENMT:No sinus tenderness.No thrush.No pharyngeal exudate or erythema.Fair dental hygiene    Neck:Supple,No LN,no JVD      Respiratory:Good air entry bilaterally,CTA    Cardiovascular:S1 S2 wnl, No murmurs,rub or gallops    Gastrointestinal:Soft BS(+) no tenderness no masses ,No rebound or guarding    Genitourinary:No CVA tendereness     Rectal:    Extremities:No cyanosis,clubbing or edema.    Vascular:peripheral pulses felt    Neurological:AAO X 3,No grossly focal deficits    Skin:No rash     Lymph Nodes:No palpable LNs    Musculoskeletal:No joint swelling or LOM    Psychiatric:Affect normal.                                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    Patient was recently admitted from 4/9/18 - 6/6/18. Patient was initially presented for right total knee spacer exchange and I&D with complex wound closure. Previously had a spacer exchanged performed on 3/9 and then was discharged to rehab. Patient then returned on 4/9 with fever and concern for sepsis, found to have positive cultures from knee for MRSA as well as multifocal pneumonia. During admission, patient completed course of Daptomycin for MRSA prosthesis infection and was also treated for multifocal PNA with ?aztreonam. Patient had extensive wound care, followed by Surgery/Plastics/Orthopedics/Id and was discharged to rehab on 6/6 with suppressive minocycline. Admission complicated by lesion over thigh and lower pannus requiring wound vacs.  UA On 6/5 positive, but no culture sent and no antibiotics started.     Today, patient presents from rehab with fevers and lethargy shortly after discharge. She reports that she otherwise feels well, denies any chills, chest pain, cough, shortness of breath, nausea, vomiting, abdominal pain or diarrhea. Reports that she had pain when her wounds were being dressed, but does not have any pain at the moment. Reports feeling well, denies any other issues.     In the ED, Tmax 100.6, HR 85,  /62, O2 98% on 2L NC. Labs showed mild leukocytosis with WBC 13.8, stable anemia Hgb 9.6, and normal platelets 319. Metabolic panel overall unremarkable with Cr 0.89. LFTs unremarkable. VBG with lactate 1.3. UA again positive with >50 WBCs, few bacteria, turbid appearance, and large LE.     Patient had a CT abdomen/pelvis that showed evidence of possible distal appendicitis. Evaluated by Surgery and family declining operative intervention due to high risk. Plastics to aid with wound management, to apply wound vac again in AM to thigh and pannus. Patient not felt to have evidence of wound infection. (07 Jun 2018 22:19)    Hospital course:  pt felt to be a nonoperative candidate and was treated with 10 days of meropenem for the appendicitis  6/13 dermatology was consulted for new ulcers. skin biopsy was done Pathology was nonspecific- could not rule ut PG and cultures were negative  Pt remained on suppressive minocycline for the knee  derm , ortho, Physical therapy and ID continued to follow patient closely  6/22 - new lesions on the volar surface of the wrist  6/29 seen by rheumatology  7/3 - wound care comments:   dx needed wounds not from scratching.  Wounds of knee & BLE/ abdomin too deep and involved w/ extensive necrosis.  Pt too tender when dressings are changed.  she couldn't scratch herself enough to create these wounds.  Pt also has HHA at bedside who denied self inflicted wounds  7/16 derm notes: .No new lesions. Lesions are getting larger.   Today dermatology suggests trial of steroids. All serivce agreed but family asked fo r a second ID opinion      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  Artificial cardiac pacemaker: 12/25/17  H/O cardiac catheterization: 11/29/16 HERBERT placed on LAD  H/O endoscopy: with dilatation of esophageal stricture 2013  S/P cataract surgery: x2; 3-4yr ago  S/P gastroplasty: 28 yrs ago  S/P cholecystectomy: more than 15 years ago  S/P total knee replacement: left, 15yr ago  S/P total knee replacement: right, 12yr ago  S/P hysterectomy: 24yr ago      Social history:   Marital Status:    Occupation: doesn't work    Substance Use : denies  Tobacco Usage:  (   ) never smoked   (  x ) former smoker  - social smoker when young (   ) current smoker  (     ) pack year  (        ) last tobacco use date  Alcohol Usage: denies   Travel: no recent  Pets: denies      FAMILY HISTORY:  No pertinent family history in first degree relatives      REVIEW OF SYSTEMS  General:	malaise    Skin: skin lesions as above  	  Ophthalmologic:Denies any visual complaints,discharge redness or photophobia  	  ENMT: had dental work- root canal prior to infection in December . Had too stop the ampicillin b/o allergy    Respiratory and Thorax:No cough,sputum or chest pain.Denies shortness of breath  	  Cardiovascular:	No chest pain,palpitaions or dizziness    Gastrointestinal:	NO nausea,abdominal pain or diarrhea.    Genitourinary:	No dysuria,frequency. No flank pain    Musculoskeletal:	 spacer    Neurological: weakness    Psychiatric: depressed     Hematology/Lymphatics:	No LN swelling.    Allergic/Immunologic:	No hives or rash     Allergies    amoxicillin (Other)    Intolerances    IV Contrast (Flushing (Skin); Nausea)      Antimicrobials:    minocycline 100 milliGRAM(s) Oral two times a day      MEDICATIONS  (STANDING):  ascorbic acid 500 milliGRAM(s) Oral daily  aspirin enteric coated 81 milliGRAM(s) Oral daily  betamethasone valerate 0.1% Ointment 1 Application(s) Topical two times a day  buDESOnide 160 MICROgram(s)/formoterol 4.5 MICROgram(s) Inhaler 2 Puff(s) Inhalation two times a day  calcium carbonate 1250 mG + Vitamin D (OsCal 500 + D) 1 Tablet(s) Oral three times a day  Dakins Solution - 1/4 Strength 1 Application(s) Topical two times a day  folic acid 1 milliGRAM(s) Oral daily  gabapentin 300 milliGRAM(s) Oral three times a day  loratadine 10 milliGRAM(s) Oral daily  melatonin 3 milliGRAM(s) Oral at bedtime  metoprolol tartrate 12.5 milliGRAM(s) Oral two times a day  minocycline 100 milliGRAM(s) Oral two times a day  multivitamin 1 Tablet(s) Oral daily  pantoprazole    Tablet 40 milliGRAM(s) Oral before breakfast  polyethylene glycol 3350 17 Gram(s) Oral two times a day  simvastatin 20 milliGRAM(s) Oral at bedtime  tiotropium 18 MICROgram(s) Capsule 1 Capsule(s) Inhalation daily    MEDICATIONS  (PRN):  acetaminophen   Tablet. 650 milliGRAM(s) Oral every 6 hours PRN Mild Pain (1 - 3)  ALPRAZolam 0.25 milliGRAM(s) Oral daily PRN prior to dressing change ---anxiety  bisacodyl Suppository 10 milliGRAM(s) Rectal daily PRN Constipation  bismuth subsalicylate Liquid 30 milliLiter(s) Oral every 6 hours PRN Cramping/abd pain  HYDROmorphone   Tablet 2 milliGRAM(s) Oral every 4 hours PRN Severe Pain (7 - 10)  HYDROmorphone  Injectable 0.5 milliGRAM(s) IV Push every 12 hours PRN please give before wound changes  magnesium hydroxide Suspension 30 milliLiter(s) Oral daily PRN Constipation  senna 2 Tablet(s) Oral at bedtime PRN Constipation  simethicone 80 milliGRAM(s) Chew two times a day PRN Gas        Vital Signs Last 24 Hrs  T(C): 36.6 (18 Jul 2018 10:34), Max: 37 (17 Jul 2018 16:31)  T(F): 97.8 (18 Jul 2018 10:34), Max: 98.6 (17 Jul 2018 16:31)  HR: 65 (18 Jul 2018 10:34) (66 - 74)  BP: 115/74 (18 Jul 2018 10:34) (110/69 - 132/68)  BP(mean): --  RR: 18 (18 Jul 2018 10:34) (18 - 20)  SpO2: 96% (18 Jul 2018 03:33) (95% - 96%)    PHYSICAL EXAM  Pt lying in bed  allowed limited exam of lesions   obese  HEENT- wnl ,fair dental hygeine  lungs- clear  Cardiovascular:  S1 S2 wnl,   Gastrointestinal:   Soft BS(+) no tenderness no masses ,No rebound or guarding  vac in place  Extremities  :knee wound with vac  skin  :   R lateral thigh with 4 cm linear, deep, well circumscribed ulcer and 3 cm, well circumscribed, full thickness ulcer with fibrinous base and minimal surrounding erythema.    L lateral thigh: DEEP 2 cm oval ulcer, well-demarcated with undermining and surrounding erythema    Wound vac on lower abdomen, R thigh. L thigh  .                                RECENT CULTURES:      MICROBIOLOGY:          Radiology:

## 2018-07-18 NOTE — CONSULT NOTE ADULT - CONSULT REQUESTED DATE/TIME
08-Jun-2018 16:03
07-Jun-2018
08-Jun-2018 12:14
08-Jun-2018 14:26
11-Jul-2018
11-Jul-2018 17:32
13-Jun-2018 02:08
27-Jun-2018 10:00
08-Jun-2018 03:14
18-Jul-2018 14:17
29-Jun-2018 18:36

## 2018-07-18 NOTE — PROGRESS NOTE ADULT - SUBJECTIVE AND OBJECTIVE BOX
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    Patient was recently admitted from 4/9/18 - 6/6/18. Patient was initially presented for right total knee spacer exchange and I&D with complex wound closure. Previously had a spacer exchanged performed on 3/9 and then was discharged to rehab. Patient then returned on 4/9 with fever and concern for sepsis, found to have positive cultures from knee for MRSA as well as multifocal pneumonia. During admission, patient completed course of Daptomycin for MRSA prosthesis infection and was also treated for multifocal PNA with ?aztreonam. Patient had extensive wound care, followed by Surgery/Plastics/Orthopedics/Id and was discharged to rehab on 6/6 with suppressive minocycline. Admission complicated by lesion over thigh and lower pannus requiring wound vacs.  UA On 6/5 positive, but no culture sent and no antibiotics started.      Patient presents from rehab with fevers and lethargy shortly after discharge. In the ED, Tmax 100.6, HR 85,  /62, O2 98% on 2L NC. Labs showed mild leukocytosis with WBC 13.8, stable anemia Hgb 9.6, and normal platelets 319. Metabolic panel overall unremarkable with Cr 0.89. LFTs unremarkable. VBG with lactate 1.3. UA again positive with >50 WBCs, few bacteria, turbid appearance, and large LE.     Patient had a CT abdomen/pelvis that showed evidence of possible distal appendicitis. Evaluated by Surgery and family declining operative intervention due to high risk. Plastics to aid with wound management, to apply wound vac again in AM to thigh and pannus. Patient not felt to have evidence of wound infection. (07 Jun 2018 22:19)    I saw the pt several times on the last admit. and she had a ferritin that was over 400 and was given at times procrit when the hemoglobin got into the 7 range. The pt has a inflammatory anemia and the hemoglobin now is about 8.6 and she is normocytic hypochromic with a high rdw. Will at this time follow the pt and I see no need now for an extensive workup for this anemia. Will support as needed. 7/12 met with pt and aid at bedside and explained approach to the anemia no need for procrit. 7/16 notes reviewed from the last few days and the hemoglobin was 8.4. 7/18 case discussed with Dr Joseph and steroids may need to be given. the hemoglobin was noted to be 8.9 and epo will be on hold for now.    Vital Signs Last 24 Hrs  T(C): 36.4 (16 Jul 2018 09:46), Max: 36.7 (15 Jul 2018 14:00)  T(F): 97.5 (16 Jul 2018 09:46), Max: 98.1 (16 Jul 2018 06:17)  HR: 63 (16 Jul 2018 09:46) (63 - 78)  BP: 126/64 (16 Jul 2018 09:46) (115/64 - 154/84)  BP(mean): --  RR: 18 (16 Jul 2018 09:46) (18 - 20)  SpO2: 100% (16 Jul 2018 09:46) (94% - 100%)  MEDICATIONS  (STANDING):  ALPRAZolam 0.25 milliGRAM(s) Oral once  ascorbic acid 500 milliGRAM(s) Oral daily  aspirin enteric coated 81 milliGRAM(s) Oral daily  betamethasone valerate 0.1% Ointment 1 Application(s) Topical two times a day  buDESOnide 160 MICROgram(s)/formoterol 4.5 MICROgram(s) Inhaler 2 Puff(s) Inhalation two times a day  calcium carbonate 1250 mG + Vitamin D (OsCal 500 + D) 1 Tablet(s) Oral three times a day  Dakins Solution - 1/4 Strength 1 Application(s) Topical two times a day  folic acid 1 milliGRAM(s) Oral daily  gabapentin 300 milliGRAM(s) Oral three times a day  loratadine 10 milliGRAM(s) Oral daily  melatonin 3 milliGRAM(s) Oral at bedtime  metoprolol tartrate 12.5 milliGRAM(s) Oral two times a day  minocycline 100 milliGRAM(s) Oral two times a day  multivitamin 1 Tablet(s) Oral daily  pantoprazole    Tablet 40 milliGRAM(s) Oral before breakfast  polyethylene glycol 3350 17 Gram(s) Oral two times a day  simvastatin 20 milliGRAM(s) Oral at bedtime  tiotropium 18 MICROgram(s) Capsule 1 Capsule(s) Inhalation daily    MEDICATIONS  (PRN):  acetaminophen   Tablet. 650 milliGRAM(s) Oral every 6 hours PRN Mild Pain (1 - 3)  bisacodyl Suppository 10 milliGRAM(s) Rectal daily PRN Constipation  bismuth subsalicylate Liquid 30 milliLiter(s) Oral every 6 hours PRN Cramping/abd pain  HYDROmorphone   Tablet 2 milliGRAM(s) Oral every 4 hours PRN Severe Pain (7 - 10)  magnesium hydroxide Suspension 30 milliLiter(s) Oral daily PRN Constipation  senna 2 Tablet(s) Oral at bedtime PRN Constipation  simethicone 80 milliGRAM(s) Chew two times a day PRN Gas    LABS:                   07-11    141  |  99  |  30<H>  ----------------------------<  123<H>  4.3   |  29  |  0.81    Ca    9.6      11 Jul 2018 07:11      PT/INR - ( 11 Jul 2018 08:08 )   PT: 15.5 sec;   INR: 1.36 ratio         PTT - ( 11 Jul 2018 08:08 )  PTT:36.8 sec      ROS:  Negative except for:    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  Artificial cardiac pacemaker: 12/25/17  H/O cardiac catheterization: 11/29/16 HERBERT placed on LAD  H/O endoscopy: with dilatation of esophageal stricture 2013  S/P cataract surgery: x2; 3-4yr ago  S/P gastroplasty: 28 yrs ago  S/P cholecystectomy: more than 15 years ago  S/P total knee replacement: left, 15yr ago  S/P total knee replacement: right, 12yr ago  S/P hysterectomy: 24yr ago      SOCIAL HISTORY:    FAMILY HISTORY:  No pertinent family history in first degree relatives      Allergies    amoxicillin (Other)    Intolerances    IV Contrast (Flushing (Skin); Nausea)      PHYSICAL EXAM as per attending and she was in pain being cared for by the staff at the time of my visit.  General: adult in NAD  HEENT: clear oropharynx, anicteric sclera, pink conjunctivae  Neck: supple  CV: normal S1S2 with no murmur rubs or gallops  Lungs: clear to auscultation, no wheezes, no rhales  Abdomen: soft non-tender non-distended, no hepato/splenomegaly  Ext: no clubbing cyanosis or edema  Skin: no rashes and no petichiae  Neuro: alert and oriented X3 no focal deficits    BLOOD SMEAR INTERPRETATION:    RADIOLOGY :

## 2018-07-18 NOTE — PROGRESS NOTE ADULT - SUBJECTIVE AND OBJECTIVE BOX
pain controlled  dressings changed today    afvss  nad  abdominal wound - vac  RLE  thigh - dressing per wound care teams, lateral wound wet to dry per prs/wc teams  knee wound with vac  remainder of wounds dressed by wound care  5/5 ta/ehl/gcs  silt l4-s1  2+ dp pulse    ROS: depressed

## 2018-07-18 NOTE — PROGRESS NOTE ADULT - SUBJECTIVE AND OBJECTIVE BOX
Patient is a 77y old  Female who presents with a chief complaint of fever, lethargy (07 Jun 2018 22:19)      INTERVAL HPI/OVERNIGHT EVENTS: none, comfortable  waiting for dressing change      Vital Signs Last 24 Hrs  T(C): 36.6 (18 Jul 2018 10:34), Max: 37 (17 Jul 2018 16:31)  T(F): 97.8 (18 Jul 2018 10:34), Max: 98.6 (17 Jul 2018 16:31)  HR: 67 (18 Jul 2018 10:34) (66 - 74)  BP: 115/74 (18 Jul 2018 10:34) (110/69 - 132/68)  BP(mean): --  RR: 18 (18 Jul 2018 10:34) (18 - 20)  SpO2: 96% (18 Jul 2018 03:33) (95% - 96%)    acetaminophen   Tablet. 650 milliGRAM(s) Oral every 6 hours PRN  ALPRAZolam 0.25 milliGRAM(s) Oral daily PRN  ascorbic acid 500 milliGRAM(s) Oral daily  aspirin enteric coated 81 milliGRAM(s) Oral daily  betamethasone valerate 0.1% Ointment 1 Application(s) Topical two times a day  bisacodyl Suppository 10 milliGRAM(s) Rectal daily PRN  bismuth subsalicylate Liquid 30 milliLiter(s) Oral every 6 hours PRN  buDESOnide 160 MICROgram(s)/formoterol 4.5 MICROgram(s) Inhaler 2 Puff(s) Inhalation two times a day  calcium carbonate 1250 mG + Vitamin D (OsCal 500 + D) 1 Tablet(s) Oral three times a day  Dakins Solution - 1/4 Strength 1 Application(s) Topical two times a day  folic acid 1 milliGRAM(s) Oral daily  gabapentin 300 milliGRAM(s) Oral three times a day  HYDROmorphone   Tablet 2 milliGRAM(s) Oral every 4 hours PRN  HYDROmorphone  Injectable 0.5 milliGRAM(s) IV Push every 12 hours PRN  loratadine 10 milliGRAM(s) Oral daily  magnesium hydroxide Suspension 30 milliLiter(s) Oral daily PRN  melatonin 3 milliGRAM(s) Oral at bedtime  metoprolol tartrate 12.5 milliGRAM(s) Oral two times a day  minocycline 100 milliGRAM(s) Oral two times a day  multivitamin 1 Tablet(s) Oral daily  pantoprazole    Tablet 40 milliGRAM(s) Oral before breakfast  polyethylene glycol 3350 17 Gram(s) Oral two times a day  senna 2 Tablet(s) Oral at bedtime PRN  simethicone 80 milliGRAM(s) Chew two times a day PRN  simvastatin 20 milliGRAM(s) Oral at bedtime  tiotropium 18 MICROgram(s) Capsule 1 Capsule(s) Inhalation daily      PHYSICAL EXAM:  GENERAL: NAD,   EYES: conjunctiva and sclera clear  ENMT: Moist mucous membranes  NECK: Supple, No JVD, Normal thyroid  NERVOUS SYSTEM:  Alert & Oriented X3,   CHEST/LUNG: Clear to auscultation bilaterally; No rales, rhonchi, wheezing, or rubs  HEART: Regular rate and rhythm; No murmurs, rubs, or gallops  ABDOMEN: Soft, Nontender, Nondistended; Bowel sounds present  right lateral wound vac removed, wound packed, right knee wound vac, right medial and left medial thigh dressed  EXTREMITIES:  2+ Peripheral Pulses, No clubbing, cyanosis, or edema  LYMPH: No lymphadenopathy noted  SKIN: No rashes or lesions    Consultant(s) Notes Reviewed:  [x ] YES  [ ] NO  Care Discussed with Consultants/Other Providers [ x] YES  [ ] NO    LABS:          PT/INR - ( 18 Jul 2018 06:58 )   PT: 26.3 sec;   INR: 2.39 ratio             CAPILLARY BLOOD GLUCOSE                RADIOLOGY & ADDITIONAL TESTS:    Imaging Personally Reviewed:  [ ] YES  [ ] NO

## 2018-07-18 NOTE — PROGRESS NOTE ADULT - ASSESSMENT
7/18 case discussed with Dr Joseph and steroids may need to be given. the hemoglobin was noted to be 8.9 and epo will be on hold for now.

## 2018-07-18 NOTE — CHART NOTE - NSCHARTNOTEFT_GEN_A_CORE
ID c/s appreciated  derm c/s reviewed  discussed with pt's  and son on phone- agreeable for trial of steroids  will start prednisone 50qd

## 2018-07-19 ENCOUNTER — MOBILE ON CALL (OUTPATIENT)
Age: 78
End: 2018-07-19

## 2018-07-19 LAB
INR BLD: 2.62 RATIO — HIGH (ref 0.88–1.16)
PROTHROM AB SERPL-ACNC: 29.1 SEC — HIGH (ref 9.8–12.7)

## 2018-07-19 PROCEDURE — 99232 SBSQ HOSP IP/OBS MODERATE 35: CPT

## 2018-07-19 RX ORDER — WARFARIN SODIUM 2.5 MG/1
1 TABLET ORAL ONCE
Qty: 0 | Refills: 0 | Status: COMPLETED | OUTPATIENT
Start: 2018-07-19 | End: 2018-07-19

## 2018-07-19 RX ADMIN — Medication 1 APPLICATION(S): at 23:08

## 2018-07-19 RX ADMIN — HYDROMORPHONE HYDROCHLORIDE 2 MILLIGRAM(S): 2 INJECTION INTRAMUSCULAR; INTRAVENOUS; SUBCUTANEOUS at 06:47

## 2018-07-19 RX ADMIN — Medication 2 TABLET(S): at 13:16

## 2018-07-19 RX ADMIN — LORATADINE 10 MILLIGRAM(S): 10 TABLET ORAL at 13:16

## 2018-07-19 RX ADMIN — Medication 12.5 MILLIGRAM(S): at 17:29

## 2018-07-19 RX ADMIN — GABAPENTIN 300 MILLIGRAM(S): 400 CAPSULE ORAL at 06:13

## 2018-07-19 RX ADMIN — Medication 1 APPLICATION(S): at 05:55

## 2018-07-19 RX ADMIN — Medication 81 MILLIGRAM(S): at 13:16

## 2018-07-19 RX ADMIN — MINOCYCLINE HYDROCHLORIDE 100 MILLIGRAM(S): 45 TABLET, EXTENDED RELEASE ORAL at 17:29

## 2018-07-19 RX ADMIN — Medication 12.5 MILLIGRAM(S): at 06:13

## 2018-07-19 RX ADMIN — SIMVASTATIN 20 MILLIGRAM(S): 20 TABLET, FILM COATED ORAL at 22:40

## 2018-07-19 RX ADMIN — Medication 1 APPLICATION(S): at 17:30

## 2018-07-19 RX ADMIN — Medication 3 MILLIGRAM(S): at 22:38

## 2018-07-19 RX ADMIN — Medication 1 TABLET(S): at 13:16

## 2018-07-19 RX ADMIN — TIOTROPIUM BROMIDE 1 CAPSULE(S): 18 CAPSULE ORAL; RESPIRATORY (INHALATION) at 13:16

## 2018-07-19 RX ADMIN — Medication 1 MILLIGRAM(S): at 13:16

## 2018-07-19 RX ADMIN — Medication 2 TABLET(S): at 22:39

## 2018-07-19 RX ADMIN — GABAPENTIN 300 MILLIGRAM(S): 400 CAPSULE ORAL at 13:16

## 2018-07-19 RX ADMIN — BUDESONIDE AND FORMOTEROL FUMARATE DIHYDRATE 2 PUFF(S): 160; 4.5 AEROSOL RESPIRATORY (INHALATION) at 17:29

## 2018-07-19 RX ADMIN — Medication 500 MILLIGRAM(S): at 13:16

## 2018-07-19 RX ADMIN — MINOCYCLINE HYDROCHLORIDE 100 MILLIGRAM(S): 45 TABLET, EXTENDED RELEASE ORAL at 06:13

## 2018-07-19 RX ADMIN — BUDESONIDE AND FORMOTEROL FUMARATE DIHYDRATE 2 PUFF(S): 160; 4.5 AEROSOL RESPIRATORY (INHALATION) at 06:13

## 2018-07-19 RX ADMIN — Medication 1 TABLET(S): at 06:13

## 2018-07-19 RX ADMIN — GABAPENTIN 300 MILLIGRAM(S): 400 CAPSULE ORAL at 22:38

## 2018-07-19 RX ADMIN — HYDROMORPHONE HYDROCHLORIDE 0.5 MILLIGRAM(S): 2 INJECTION INTRAMUSCULAR; INTRAVENOUS; SUBCUTANEOUS at 11:17

## 2018-07-19 RX ADMIN — HYDROMORPHONE HYDROCHLORIDE 0.5 MILLIGRAM(S): 2 INJECTION INTRAMUSCULAR; INTRAVENOUS; SUBCUTANEOUS at 23:35

## 2018-07-19 RX ADMIN — PANTOPRAZOLE SODIUM 40 MILLIGRAM(S): 20 TABLET, DELAYED RELEASE ORAL at 06:13

## 2018-07-19 RX ADMIN — HYDROMORPHONE HYDROCHLORIDE 0.5 MILLIGRAM(S): 2 INJECTION INTRAMUSCULAR; INTRAVENOUS; SUBCUTANEOUS at 11:02

## 2018-07-19 RX ADMIN — HYDROMORPHONE HYDROCHLORIDE 0.5 MILLIGRAM(S): 2 INJECTION INTRAMUSCULAR; INTRAVENOUS; SUBCUTANEOUS at 22:36

## 2018-07-19 RX ADMIN — WARFARIN SODIUM 1 MILLIGRAM(S): 2.5 TABLET ORAL at 22:38

## 2018-07-19 RX ADMIN — Medication 1 APPLICATION(S): at 11:02

## 2018-07-19 RX ADMIN — HYDROMORPHONE HYDROCHLORIDE 2 MILLIGRAM(S): 2 INJECTION INTRAMUSCULAR; INTRAVENOUS; SUBCUTANEOUS at 07:17

## 2018-07-19 RX ADMIN — Medication 650 MILLIGRAM(S): at 00:00

## 2018-07-19 RX ADMIN — Medication 50 MILLIGRAM(S): at 13:16

## 2018-07-19 NOTE — PROGRESS NOTE ADULT - SUBJECTIVE AND OBJECTIVE BOX
Patient is a 77y old  Female who presents with a chief complaint of fever, lethargy (07 Jun 2018 22:19)      INTERVAL HPI/OVERNIGHT EVENTS: none, pt sleepy today, but alert when woken up  resting comfortably      Vital Signs Last 24 Hrs  T(C): 36.7 (18 Jul 2018 21:38), Max: 36.7 (18 Jul 2018 17:39)  T(F): 98 (18 Jul 2018 21:38), Max: 98 (18 Jul 2018 17:39)  HR: 71 (18 Jul 2018 21:38) (71 - 82)  BP: 149/68 (18 Jul 2018 21:38) (118/80 - 149/68)  BP(mean): --  RR: 18 (18 Jul 2018 21:38) (18 - 18)  SpO2: 95% (18 Jul 2018 21:38) (95% - 95%)    acetaminophen   Tablet. 650 milliGRAM(s) Oral every 6 hours PRN  ALPRAZolam 0.25 milliGRAM(s) Oral daily PRN  ascorbic acid 500 milliGRAM(s) Oral daily  aspirin enteric coated 81 milliGRAM(s) Oral daily  betamethasone valerate 0.1% Ointment 1 Application(s) Topical two times a day  bisacodyl Suppository 10 milliGRAM(s) Rectal daily PRN  bismuth subsalicylate Liquid 30 milliLiter(s) Oral every 6 hours PRN  buDESOnide 160 MICROgram(s)/formoterol 4.5 MICROgram(s) Inhaler 2 Puff(s) Inhalation two times a day  calcium carbonate  625 mG + Vitamin D (OsCal 250 + D) 2 Tablet(s) Oral three times a day  Dakins Solution - 1/4 Strength 1 Application(s) Topical two times a day  folic acid 1 milliGRAM(s) Oral daily  gabapentin 300 milliGRAM(s) Oral three times a day  HYDROmorphone   Tablet 2 milliGRAM(s) Oral every 4 hours PRN  HYDROmorphone  Injectable 0.5 milliGRAM(s) IV Push every 12 hours PRN  loratadine 10 milliGRAM(s) Oral daily  magnesium hydroxide Suspension 30 milliLiter(s) Oral daily PRN  melatonin 3 milliGRAM(s) Oral at bedtime  metoprolol tartrate 12.5 milliGRAM(s) Oral two times a day  minocycline 100 milliGRAM(s) Oral two times a day  multivitamin 1 Tablet(s) Oral daily  pantoprazole    Tablet 40 milliGRAM(s) Oral before breakfast  polyethylene glycol 3350 17 Gram(s) Oral two times a day  predniSONE   Tablet 50 milliGRAM(s) Oral daily  senna 2 Tablet(s) Oral at bedtime PRN  simethicone 80 milliGRAM(s) Chew two times a day PRN  simvastatin 20 milliGRAM(s) Oral at bedtime  tiotropium 18 MICROgram(s) Capsule 1 Capsule(s) Inhalation daily      PHYSICAL EXAM:  GENERAL: NAD,   EYES: conjunctiva and sclera clear  ENMT: Moist mucous membranes  NECK: Supple, No JVD, Normal thyroid  NERVOUS SYSTEM:  Alert & Oriented X3,   CHEST/LUNG: Clear to auscultation bilaterally; No rales, rhonchi, wheezing, or rubs  HEART: Regular rate and rhythm; No murmurs, rubs, or gallops  ABDOMEN: Soft, Nontender, Nondistended; Bowel sounds present  EXTREMITIES:  2+ Peripheral Pulses, No clubbing, cyanosis, or edema  right lateral wound vac removed, wound packed, right knee wound vac, right medial and left medial thigh dressed  LYMPH: No lymphadenopathy noted  SKIN: No rashes or lesions    Consultant(s) Notes Reviewed:  [x ] YES  [ ] NO  Care Discussed with Consultants/Other Providers [ x] YES  [ ] NO    LABS:          PT/INR - ( 19 Jul 2018 07:24 )   PT: 29.1 sec;   INR: 2.62 ratio             CAPILLARY BLOOD GLUCOSE                RADIOLOGY & ADDITIONAL TESTS:    Imaging Personally Reviewed:  [x ] YES  [ ] NO

## 2018-07-19 NOTE — PROGRESS NOTE ADULT - SUBJECTIVE AND OBJECTIVE BOX
pain controlled, in good spirits as family visiting today  dressings changed again today per report  prednisone started      afvss  nad  abdominal wound - vac  RLE  thigh - dressing per wound care teams, lateral wound wet to dry per prs/wc teams  knee wound with vac  remainder of wounds dressed by wound care  5/5 ta/ehl/gcs  silt l4-s1  2+ dp pulse    ROS: depressed

## 2018-07-19 NOTE — PROGRESS NOTE ADULT - ASSESSMENT
no plan for orthopedic surgical intervention per family and patient preference  vac and wound care per PRS and wound care teams  per derm started prednisone today to treat possible atypical presentation of pyoderma granulosum  PO abx per ID team, minocycline  encourage patient to spend majority of bed oob in bedside chair as able, has been refusing oob for past 1 month, PLEASE HAVE PT COME BY TO MOBILIZE PATIENT, she must remain 50% wb on the RLE and NO KNEE MOTION allowed, knee immobilizer if oob  coumadin, inr goal 2-3  continue to optimize nutrition  agree with pain team consult (pending), needs to switch to PO regimen  continue to involve psych given depression symptoms  orthopedic issues are stable for discharge to rehab, please make sure patient and family is okay with discharge before initiating planning process

## 2018-07-19 NOTE — PROGRESS NOTE ADULT - ASSESSMENT
78 yo F with a PMH of CAD s/p HERBERT, AS s/p TAVR, bradycardia s/p PM, MVR, DVT/PE, and s/p TKR who has recurrent, deep skin ulcers of unknown etiology despite multiple skin biopsies and tissue cultures.    1. Skin ulcers on the lower abdomen and lower extremities. Differential diagnosis includes pyoderma gangrenosum vs. urticaria with excoriatilon vs. atypical mycobacteria  - s/p numerous skin biopsies. The last three biopsies have shown findings consistent with urticaria. One previous biopsy showed findings of an abscess, which could be consistent with pyoderma gangrenosum  - No sign of cutaneous infection on prior biopsies.   - Although unlikely, consider hypercoagulable state work-up.   - One lesion on the R lateral thigh was with treated with ILK 40 without improvement  - Given findings of urticaria and stable/improving wounds on the wound vac, would recommend trimming of patients nails as there is chance she is using them to dig into her skin. Would also place wound vacs/special dressings on the new lesions and insure only medical personnel are manipulating the dressings and wound vac.  - Patient was cleared by infectious disease for prednisone use. Pyoderma gangrenosum remains on the differential diagnosis. Started empiric Prednisone 50 mg QD. Response or lack of response will be very informative in establishing a diagnosis of PG.   - Patient was seen and discussed with Dr. Diaz. Will follow along with you. 78 yo F with a PMH of CAD s/p HERBERT, AS s/p TAVR, bradycardia s/p PM, MVR, DVT/PE, and s/p TKR who has recurrent, deep skin ulcers of unknown etiology despite multiple skin biopsies and tissue cultures.    1. Skin ulcers on the lower abdomen and lower extremities. Differential diagnosis includes pyoderma gangrenosum vs. urticaria with excoriation vs. atypical mycobacteria  - s/p numerous skin biopsies. The last three biopsies have shown findings consistent with urticaria. One previous biopsy showed findings of an abscess, which could be consistent with pyoderma gangrenosum  - No sign of cutaneous infection on prior biopsies.   - Although unlikely, consider hypercoagulable work-up.   - One lesion on the R lateral thigh was with treated with ILK 40 without improvement  - Given findings of urticaria and stable/improving wounds on the wound vac, would recommend trimming of patients nails as there is chance she is using them to dig into her skin. Would also place wound vacs/special dressings on the new lesions and insure only medical personnel are manipulating the dressings and wound vac.  - Patient was cleared by infectious disease for prednisone use. Pyoderma gangrenosum remains on the differential diagnosis. Continue empiric Prednisone 50 mg QD. Response or lack of response will be very informative in establishing a diagnosis of PG.   - Patient was seen and discussed with Dr. Diaz. Will follow along with you.

## 2018-07-19 NOTE — PROGRESS NOTE ADULT - SUBJECTIVE AND OBJECTIVE BOX
INTERVAL HPI/OVERNIGHT EVENTS:  No acute events overnight. No new lesions. Lesions remain painful. Patient's aide, Lissa, reports significant drying of 5/6 lesions.    HPI:    76 yo F with a PMH of CAD s/p HERBRET, AS s/p TAVR, bradycardia s/p PM, MVR, DVT/PE, and s/p TKR who presented on 6/7 with appendicitis.  Dermatology has been following her for ulcers since 5/23/18 during her previous admission. We are re-consulted for a new lesion on the R thigh. Painful. Noticed on Monday. Growing. Worried that this too will ulcerate. Otherwise well.    When Pt was initially consulted in late May for R knee ulcer, the ulcer was already debrided and it was difficult to understand how the ulcer initially arose. At the time of the initial, pt already had wound VAC placed in lower abdomen and R anterior thigh. Over the course of the month, pt developed new ulcers and lesions.    Biopsies were obtained from ulcers on R medial knee on May 23 (debrided prior to biopsy) and L inner thigh on Jun 13 (new ulcer) along with tissue cultures. The biopsies showed mixed inflammatory patterns. Bacterial tissue cx's were reviewed by ID, and they were thought to be results of colonization. Mycobacteria and fungal cx were negative. Another tissue cx obtained from subcutaneous tissue from R lateral thigh on Jun 18 to rule out atypical mycobacteria has been negative.    Another biopsy from L wrist and L thigh (pt's daughter reported the thigh lesion appeared to be how all the ulcers started) on Jun 22 revealed urticaria and excoriation.      Seen with her aid, Lissa.     MEDICATIONS  (STANDING):  ALPRAZolam 0.25 milliGRAM(s) Oral once  ascorbic acid 500 milliGRAM(s) Oral daily  aspirin enteric coated 81 milliGRAM(s) Oral daily  betamethasone valerate 0.1% Ointment 1 Application(s) Topical two times a day  buDESOnide 160 MICROgram(s)/formoterol 4.5 MICROgram(s) Inhaler 2 Puff(s) Inhalation two times a day  calcium carbonate 1250 mG + Vitamin D (OsCal 500 + D) 1 Tablet(s) Oral three times a day  Dakins Solution - 1/4 Strength 1 Application(s) Topical two times a day  folic acid 1 milliGRAM(s) Oral daily  gabapentin 300 milliGRAM(s) Oral three times a day  loratadine 10 milliGRAM(s) Oral daily  melatonin 3 milliGRAM(s) Oral at bedtime  metoprolol tartrate 12.5 milliGRAM(s) Oral two times a day  minocycline 100 milliGRAM(s) Oral two times a day  multivitamin 1 Tablet(s) Oral daily  pantoprazole    Tablet 40 milliGRAM(s) Oral before breakfast  polyethylene glycol 3350 17 Gram(s) Oral two times a day  simvastatin 20 milliGRAM(s) Oral at bedtime  tiotropium 18 MICROgram(s) Capsule 1 Capsule(s) Inhalation daily  warfarin 1 milliGRAM(s) Oral once    MEDICATIONS  (PRN):  acetaminophen   Tablet. 650 milliGRAM(s) Oral every 6 hours PRN Mild Pain (1 - 3)  bisacodyl Suppository 10 milliGRAM(s) Rectal daily PRN Constipation  bismuth subsalicylate Liquid 30 milliLiter(s) Oral every 6 hours PRN Cramping/abd pain  HYDROmorphone   Tablet 4 milliGRAM(s) Oral every 4 hours PRN Severe Pain (7 - 10)  HYDROmorphone   Tablet 2 milliGRAM(s) Oral every 3 hours PRN Moderate Pain (4 - 6)  magnesium hydroxide Suspension 30 milliLiter(s) Oral daily PRN Constipation  senna 2 Tablet(s) Oral at bedtime PRN Constipation  simethicone 80 milliGRAM(s) Chew two times a day PRN Gas      Allergies    amoxicillin (Other)    Intolerances    IV Contrast (Flushing (Skin); Nausea)      REVIEW OF SYSTEMS      General: no fevers/chills, no NS	    Skin: see HPI  	  Ophthalmologic: no eye pain or change in vision    Genitourinary: no dysuria or hematuria    Musculoskeletal: no joint pains or weakness	    Neurological:no weakness or tingling          Vital Signs Last 24 Hrs  T(C): 36.4 (13 Jul 2018 09:02), Max: 37.1 (12 Jul 2018 17:38)  T(F): 97.6 (13 Jul 2018 09:02), Max: 98.7 (12 Jul 2018 17:38)  HR: 75 (13 Jul 2018 09:02) (75 - 86)  BP: 109/71 (13 Jul 2018 09:02) (109/71 - 115/78)  BP(mean): --  RR: 18 (13 Jul 2018 09:02) (18 - 18)  SpO2: 92% (13 Jul 2018 09:02) (92% - 98%)    PHYSICAL EXAM:   The patient was alert and oriented X 3, well nourished, and in no  apparent distress.  There was no visible lymphadenopathy.  Conjunctiva were non injected  There was no clubbing or edema of extremities.  There was no hyperhidrosis or bromhidrosis.    Of note on skin exam:   R lateral thigh with 4 cm linear, deep, well circumscribed ulcer and 3 cm, well circumscribed, full thickness ulcer with fibrinous base and minimal surrounding erythema.    L lateral thigh: 2 cm oval ulcer, well-demarcated (improving)    Wound vac on lower abdomen, R thigh. L thigh    LABS:                        8.9    10.36 )-----------( 291      ( 13 Jul 2018 09:30 )             28.7     07-13    139  |  98  |  27<H>  ----------------------------<  124<H>  4.3   |  29  |  0.87    Ca    10.0      13 Jul 2018 07:17      PT/INR - ( 13 Jul 2018 08:02 )   PT: 30.3 sec;   INR: 2.63 ratio         PTT - ( 12 Jul 2018 08:18 )  PTT:39.1 sec      RADIOLOGY & ADDITIONAL TESTS:

## 2018-07-19 NOTE — PROGRESS NOTE ADULT - ASSESSMENT
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 Pt completed 10 days meropenem.     # abd wound, multiple bl thigh wounds  pt with new R thigh lesion, sp punch biopsy by derm 7/11, positive for urticaria, not consistent with PG  prior biopsies were not consistent with pyoderma gangrenosum, however, pt continues to develop new ulcers  6/22 left thigh and left arm punch biopsies consistent with urticaria as well. evaluated by allergy.  tissue culture grew coag neg staph likely colonized  IAs multiple biopsies have not been pos for PG,  7/13 sp intralesional kenalog lesion on right thigh, which did not show improvement after 3 days  continue vac to abdomen, rt Knee  appreciate wound care recs, daily dressing changes, wound vac changes every other day  Trial of steroids started - to monitor       # Pain control  we have not been able to secure pain management consult, nursing manager is aware  Mr Rdz and Son Thony ask to try to decrease dilaudid  pt still with pain that prohibits dressing changes, so will cont dilaudid 2mg q4 PRN for severe pain  try IV tylenol 1g qd PRN for moderate pain  try to avoid IV dilaudid, but okay to give 0.5mg IV before wound changes if necessary  yesterday tramadol 25mg given for pain, can cont this in efforts to try to avoid dilaudid  gabapentin 300mg tid  bowel regimen    # polymyalgia rheumatica  steroids have been on hold to allow for wound healing  no signs of active rheum dz, evaluated by rheum 6/29    # TKR wound-mrsa  chronic minocycline suppression  appreciate ID recs  family asks for second ID opinion    # chr Anemia  stable, monitor h/h closely  appreciate heme recs    # S/P TAVR (transcatheter aortic valve replacement)  daily coumadin dosing, daily INR    # adjustment disorder with depressed mood  xanax prn        plan of care dw patient and aid at bedside

## 2018-07-20 LAB
ANION GAP SERPL CALC-SCNC: 15 MMOL/L — SIGNIFICANT CHANGE UP (ref 5–17)
BUN SERPL-MCNC: 35 MG/DL — HIGH (ref 7–23)
CALCIUM SERPL-MCNC: 9.4 MG/DL — SIGNIFICANT CHANGE UP (ref 8.4–10.5)
CHLORIDE SERPL-SCNC: 102 MMOL/L — SIGNIFICANT CHANGE UP (ref 96–108)
CO2 SERPL-SCNC: 24 MMOL/L — SIGNIFICANT CHANGE UP (ref 22–31)
CREAT SERPL-MCNC: 0.74 MG/DL — SIGNIFICANT CHANGE UP (ref 0.5–1.3)
GLUCOSE SERPL-MCNC: 146 MG/DL — HIGH (ref 70–99)
HCT VFR BLD CALC: 28 % — LOW (ref 34.5–45)
HGB BLD-MCNC: 8.6 G/DL — LOW (ref 11.5–15.5)
INR BLD: 2.8 RATIO — HIGH (ref 0.88–1.16)
MCHC RBC-ENTMCNC: 26.2 PG — LOW (ref 27–34)
MCHC RBC-ENTMCNC: 30.7 GM/DL — LOW (ref 32–36)
MCV RBC AUTO: 85.4 FL — SIGNIFICANT CHANGE UP (ref 80–100)
PLATELET # BLD AUTO: 357 K/UL — SIGNIFICANT CHANGE UP (ref 150–400)
POTASSIUM SERPL-MCNC: 4.6 MMOL/L — SIGNIFICANT CHANGE UP (ref 3.5–5.3)
POTASSIUM SERPL-SCNC: 4.6 MMOL/L — SIGNIFICANT CHANGE UP (ref 3.5–5.3)
PROTHROM AB SERPL-ACNC: 32.3 SEC — HIGH (ref 10–13.1)
RBC # BLD: 3.28 M/UL — LOW (ref 3.8–5.2)
RBC # FLD: 17 % — HIGH (ref 10.3–14.5)
SODIUM SERPL-SCNC: 141 MMOL/L — SIGNIFICANT CHANGE UP (ref 135–145)
WBC # BLD: 12.13 K/UL — HIGH (ref 3.8–10.5)
WBC # FLD AUTO: 12.13 K/UL — HIGH (ref 3.8–10.5)

## 2018-07-20 PROCEDURE — 99231 SBSQ HOSP IP/OBS SF/LOW 25: CPT

## 2018-07-20 PROCEDURE — 99232 SBSQ HOSP IP/OBS MODERATE 35: CPT

## 2018-07-20 RX ORDER — WARFARIN SODIUM 2.5 MG/1
1 TABLET ORAL ONCE
Qty: 0 | Refills: 0 | Status: COMPLETED | OUTPATIENT
Start: 2018-07-20 | End: 2018-07-20

## 2018-07-20 RX ORDER — ACETAMINOPHEN 500 MG
1000 TABLET ORAL ONCE
Qty: 0 | Refills: 0 | Status: COMPLETED | OUTPATIENT
Start: 2018-07-20 | End: 2018-07-20

## 2018-07-20 RX ADMIN — BUDESONIDE AND FORMOTEROL FUMARATE DIHYDRATE 2 PUFF(S): 160; 4.5 AEROSOL RESPIRATORY (INHALATION) at 17:04

## 2018-07-20 RX ADMIN — Medication 50 MILLIGRAM(S): at 12:54

## 2018-07-20 RX ADMIN — Medication 1 APPLICATION(S): at 06:22

## 2018-07-20 RX ADMIN — WARFARIN SODIUM 1 MILLIGRAM(S): 2.5 TABLET ORAL at 23:24

## 2018-07-20 RX ADMIN — Medication 1000 MILLIGRAM(S): at 12:33

## 2018-07-20 RX ADMIN — Medication 12.5 MILLIGRAM(S): at 17:04

## 2018-07-20 RX ADMIN — Medication 1 TABLET(S): at 11:54

## 2018-07-20 RX ADMIN — Medication 2 TABLET(S): at 22:49

## 2018-07-20 RX ADMIN — Medication 3 MILLIGRAM(S): at 22:49

## 2018-07-20 RX ADMIN — HYDROMORPHONE HYDROCHLORIDE 0.5 MILLIGRAM(S): 2 INJECTION INTRAMUSCULAR; INTRAVENOUS; SUBCUTANEOUS at 09:47

## 2018-07-20 RX ADMIN — HYDROMORPHONE HYDROCHLORIDE 2 MILLIGRAM(S): 2 INJECTION INTRAMUSCULAR; INTRAVENOUS; SUBCUTANEOUS at 07:20

## 2018-07-20 RX ADMIN — SIMVASTATIN 20 MILLIGRAM(S): 20 TABLET, FILM COATED ORAL at 22:49

## 2018-07-20 RX ADMIN — PANTOPRAZOLE SODIUM 40 MILLIGRAM(S): 20 TABLET, DELAYED RELEASE ORAL at 06:30

## 2018-07-20 RX ADMIN — Medication 12.5 MILLIGRAM(S): at 06:29

## 2018-07-20 RX ADMIN — Medication 81 MILLIGRAM(S): at 11:54

## 2018-07-20 RX ADMIN — GABAPENTIN 300 MILLIGRAM(S): 400 CAPSULE ORAL at 22:49

## 2018-07-20 RX ADMIN — Medication 1 APPLICATION(S): at 09:48

## 2018-07-20 RX ADMIN — LORATADINE 10 MILLIGRAM(S): 10 TABLET ORAL at 11:54

## 2018-07-20 RX ADMIN — Medication 500 MILLIGRAM(S): at 11:54

## 2018-07-20 RX ADMIN — HYDROMORPHONE HYDROCHLORIDE 2 MILLIGRAM(S): 2 INJECTION INTRAMUSCULAR; INTRAVENOUS; SUBCUTANEOUS at 06:50

## 2018-07-20 RX ADMIN — Medication 400 MILLIGRAM(S): at 11:54

## 2018-07-20 RX ADMIN — Medication 2 TABLET(S): at 12:55

## 2018-07-20 RX ADMIN — TIOTROPIUM BROMIDE 1 CAPSULE(S): 18 CAPSULE ORAL; RESPIRATORY (INHALATION) at 11:54

## 2018-07-20 RX ADMIN — BUDESONIDE AND FORMOTEROL FUMARATE DIHYDRATE 2 PUFF(S): 160; 4.5 AEROSOL RESPIRATORY (INHALATION) at 06:30

## 2018-07-20 RX ADMIN — Medication 1 MILLIGRAM(S): at 11:54

## 2018-07-20 RX ADMIN — HYDROMORPHONE HYDROCHLORIDE 2 MILLIGRAM(S): 2 INJECTION INTRAMUSCULAR; INTRAVENOUS; SUBCUTANEOUS at 17:51

## 2018-07-20 RX ADMIN — HYDROMORPHONE HYDROCHLORIDE 2 MILLIGRAM(S): 2 INJECTION INTRAMUSCULAR; INTRAVENOUS; SUBCUTANEOUS at 17:05

## 2018-07-20 RX ADMIN — MINOCYCLINE HYDROCHLORIDE 100 MILLIGRAM(S): 45 TABLET, EXTENDED RELEASE ORAL at 06:30

## 2018-07-20 RX ADMIN — Medication 1 APPLICATION(S): at 17:51

## 2018-07-20 RX ADMIN — Medication 2 TABLET(S): at 06:30

## 2018-07-20 RX ADMIN — GABAPENTIN 300 MILLIGRAM(S): 400 CAPSULE ORAL at 12:56

## 2018-07-20 RX ADMIN — GABAPENTIN 300 MILLIGRAM(S): 400 CAPSULE ORAL at 06:30

## 2018-07-20 RX ADMIN — MINOCYCLINE HYDROCHLORIDE 100 MILLIGRAM(S): 45 TABLET, EXTENDED RELEASE ORAL at 17:04

## 2018-07-20 NOTE — PROGRESS NOTE ADULT - ASSESSMENT
no plan for orthopedic surgical intervention per family and patient preference  vac and wound care per PRS and wound care teams  per derm started prednisone today to treat possible atypical presentation of pyoderma granulosum  PO abx per ID team, minocycline  encourage patient to spend majority of bed oob in bedside chair as able, has been refusing oob for past 1 month, PLEASE HAVE PT COME BY TO MOBILIZE PATIENT, she must remain 50% wb on the RLE and NO KNEE MOTION allowed, knee immobilizer if oob  coumadin, inr goal 2-3  continue to optimize nutrition  agree with pain team consult (pending), needs to switch to PO regimen  continue to involve psych given depression symptoms  orthopedic issues are stable for discharge to rehab, please make sure patient and family is okay with discharge before initiating planning process    Patient and daughter in agreement that they would like to start mobilizing oob with PT now. Please have PT return.  Also request nutrition to come and education patient and family regarding best dietary practices to maximize caloric intake in healthy manner as this can help with wound healing.

## 2018-07-20 NOTE — PROGRESS NOTE ADULT - ATTENDING COMMENTS
Very difficult case. 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 Pyoderma gangrenosum is possible, given the seemingly pathergic response to the skin biopsy and the rapidly enlarging ulcerations.  Agree with current management.  No sign of infection or vasculitis or malignancy on biopsy so far.

## 2018-07-20 NOTE — PROGRESS NOTE ADULT - SUBJECTIVE AND OBJECTIVE BOX
Plastic Surgery Progress Note (pg [facility]: [id number], [facility]: [phone])    SUBJECTIVE:  The patient was seen and examined. No acute events overnight.    OBJECTIVE:     ** VITAL SIGNS / I&O's **    Vital Signs Last 24 Hrs  T(C): 36.6 (19 Jul 2018 21:40), Max: 36.6 (19 Jul 2018 21:40)  T(F): 97.9 (19 Jul 2018 21:40), Max: 97.9 (19 Jul 2018 21:40)  HR: 66 (19 Jul 2018 21:40) (64 - 72)  BP: 125/75 (19 Jul 2018 21:40) (117/81 - 125/76)  BP(mean): --  RR: 18 (19 Jul 2018 21:40) (18 - 18)  SpO2: 98% (19 Jul 2018 21:40) (98% - 98%)      19 Jul 2018 07:01  -  20 Jul 2018 07:00  --------------------------------------------------------  IN:    Oral Fluid: 240 mL  Total IN: 240 mL    OUT:    Voided: 400 mL  Total OUT: 400 mL    Total NET: -160 mL          ** PHYSICAL EXAM **    -- CONSTITUTIONAL: Alert, NAD   -- ABDOMEN: central abd VAC intact and set to suction.   -- EXTREMITIES: W2D dressing on R lateral thigh, R knee vac holding suction. R medial thigh- promogran dressing in place.    ** LABS **                          8.6    12.13 )-----------( 357      ( 20 Jul 2018 08:19 )             28.0     20 Jul 2018 07:19    141    |  102    |  35     ----------------------------<  146    4.6     |  24     |  0.74     Ca    9.4        20 Jul 2018 07:19      PT/INR - ( 20 Jul 2018 08:19 )   PT: 32.3 sec;   INR: 2.80 ratio           CAPILLARY BLOOD GLUCOSE                    ** MEDICATIONS **  MEDICATIONS  (STANDING):  acetaminophen  IVPB. 1000 milliGRAM(s) IV Intermittent once  ascorbic acid 500 milliGRAM(s) Oral daily  aspirin enteric coated 81 milliGRAM(s) Oral daily  betamethasone valerate 0.1% Ointment 1 Application(s) Topical two times a day  buDESOnide 160 MICROgram(s)/formoterol 4.5 MICROgram(s) Inhaler 2 Puff(s) Inhalation two times a day  calcium carbonate  625 mG + Vitamin D (OsCal 250 + D) 2 Tablet(s) Oral three times a day  Dakins Solution - 1/4 Strength 1 Application(s) Topical two times a day  folic acid 1 milliGRAM(s) Oral daily  gabapentin 300 milliGRAM(s) Oral three times a day  loratadine 10 milliGRAM(s) Oral daily  melatonin 3 milliGRAM(s) Oral at bedtime  metoprolol tartrate 12.5 milliGRAM(s) Oral two times a day  minocycline 100 milliGRAM(s) Oral two times a day  multivitamin 1 Tablet(s) Oral daily  pantoprazole    Tablet 40 milliGRAM(s) Oral before breakfast  polyethylene glycol 3350 17 Gram(s) Oral two times a day  predniSONE   Tablet 50 milliGRAM(s) Oral daily  simvastatin 20 milliGRAM(s) Oral at bedtime  tiotropium 18 MICROgram(s) Capsule 1 Capsule(s) Inhalation daily    MEDICATIONS  (PRN):  acetaminophen   Tablet. 650 milliGRAM(s) Oral every 6 hours PRN Mild Pain (1 - 3)  ALPRAZolam 0.25 milliGRAM(s) Oral daily PRN prior to dressing change ---anxiety  bisacodyl Suppository 10 milliGRAM(s) Rectal daily PRN Constipation  bismuth subsalicylate Liquid 30 milliLiter(s) Oral every 6 hours PRN Cramping/abd pain  HYDROmorphone   Tablet 2 milliGRAM(s) Oral every 4 hours PRN Severe Pain (7 - 10)  HYDROmorphone  Injectable 0.5 milliGRAM(s) IV Push every 12 hours PRN please give before wound changes  magnesium hydroxide Suspension 30 milliLiter(s) Oral daily PRN Constipation  senna 2 Tablet(s) Oral at bedtime PRN Constipation  simethicone 80 milliGRAM(s) Chew two times a day PRN Gas

## 2018-07-20 NOTE — PROGRESS NOTE ADULT - SUBJECTIVE AND OBJECTIVE BOX
Patient is a 77y old  Female who presents with a chief complaint of fever, lethargy (07 Jun 2018 22:19)      INTERVAL HPI/OVERNIGHT EVENTS: noted, examined the wounds with ID-while PT is doing wound and vac change   pt tolerating the dressing change with reasonable pain control      Vital Signs Last 24 Hrs  T(C): 36.6 (19 Jul 2018 21:40), Max: 36.6 (19 Jul 2018 21:40)  T(F): 97.9 (19 Jul 2018 21:40), Max: 97.9 (19 Jul 2018 21:40)  HR: 66 (19 Jul 2018 21:40) (64 - 72)  BP: 125/75 (19 Jul 2018 21:40) (117/81 - 125/76)  BP(mean): --  RR: 18 (19 Jul 2018 21:40) (18 - 18)  SpO2: 98% (19 Jul 2018 21:40) (98% - 98%)    acetaminophen   Tablet. 650 milliGRAM(s) Oral every 6 hours PRN  ALPRAZolam 0.25 milliGRAM(s) Oral daily PRN  ascorbic acid 500 milliGRAM(s) Oral daily  aspirin enteric coated 81 milliGRAM(s) Oral daily  betamethasone valerate 0.1% Ointment 1 Application(s) Topical two times a day  bisacodyl Suppository 10 milliGRAM(s) Rectal daily PRN  bismuth subsalicylate Liquid 30 milliLiter(s) Oral every 6 hours PRN  buDESOnide 160 MICROgram(s)/formoterol 4.5 MICROgram(s) Inhaler 2 Puff(s) Inhalation two times a day  calcium carbonate  625 mG + Vitamin D (OsCal 250 + D) 2 Tablet(s) Oral three times a day  Dakins Solution - 1/4 Strength 1 Application(s) Topical two times a day  folic acid 1 milliGRAM(s) Oral daily  gabapentin 300 milliGRAM(s) Oral three times a day  HYDROmorphone   Tablet 2 milliGRAM(s) Oral every 4 hours PRN  HYDROmorphone  Injectable 0.5 milliGRAM(s) IV Push every 12 hours PRN  loratadine 10 milliGRAM(s) Oral daily  magnesium hydroxide Suspension 30 milliLiter(s) Oral daily PRN  melatonin 3 milliGRAM(s) Oral at bedtime  metoprolol tartrate 12.5 milliGRAM(s) Oral two times a day  minocycline 100 milliGRAM(s) Oral two times a day  multivitamin 1 Tablet(s) Oral daily  pantoprazole    Tablet 40 milliGRAM(s) Oral before breakfast  polyethylene glycol 3350 17 Gram(s) Oral two times a day  predniSONE   Tablet 50 milliGRAM(s) Oral daily  senna 2 Tablet(s) Oral at bedtime PRN  simethicone 80 milliGRAM(s) Chew two times a day PRN  simvastatin 20 milliGRAM(s) Oral at bedtime  tiotropium 18 MICROgram(s) Capsule 1 Capsule(s) Inhalation daily      PHYSICAL EXAM:  GENERAL: NAD,   EYES: conjunctiva and sclera clear  ENMT: Moist mucous membranes  NECK: Supple, No JVD, Normal thyroid  NERVOUS SYSTEM:  Alert & Oriented X3,   CHEST/LUNG: Clear to auscultation bilaterally; No rales, rhonchi, wheezing, or rubs  HEART: Regular rate and rhythm; No murmurs, rubs, or gallops  ABDOMEN: Soft, Nontender, Nondistended; Bowel sounds present  EXTREMITIES:  2+ Peripheral Pulses, No clubbing, cyanosis, or edema  Wounds  1)Lower abd wound -healing well  2)rt knee-vac wound improved healing  3)rt medial thigh wound- margins indurated, healing slowly  4)Rt lateral thigh/buttock wound-large deep-necrotic tissue-packing done  5)rt lateral thigh 2 small holes-wound packing done  6)Lt lateral /post thigh: 2-3 small punched out ulcers-wound packing done      LYMPH: No lymphadenopathy noted  SKIN: No rashes or lesions    Consultant(s) Notes Reviewed:  [x ] YES  [ ] NO  Care Discussed with Consultants/Other Providers [ x] YES  [ ] NO    LABS:                        8.6    12.13 )-----------( 357      ( 20 Jul 2018 08:19 )             28.0     07-20    141  |  102  |  35<H>  ----------------------------<  146<H>  4.6   |  24  |  0.74    Ca    9.4      20 Jul 2018 07:19      PT/INR - ( 20 Jul 2018 08:19 )   PT: 32.3 sec;   INR: 2.80 ratio             CAPILLARY BLOOD GLUCOSE                RADIOLOGY & ADDITIONAL TESTS:    Imaging Personally Reviewed:  [x ] YES  [ ] NO

## 2018-07-20 NOTE — PROGRESS NOTE ADULT - SUBJECTIVE AND OBJECTIVE BOX
Patient is a 77y old  Female who presents with a chief complaint of fever, lethargy (07 Jun 2018 22:19)    Being followed by ID for skin lesions( 2nd opinion)    was unable to fully examine patient on wednesday because vac was in place  Coordinated with physical therapy, medicine and plastics the dressing/vac change  Pt is in pain at wounds but otherwise in ok spirits  daughter is at bedside and aide  breathing stable  no diarrhea  no urinary complaints  remainder ROS negative    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  Artificial cardiac pacemaker: 12/25/17  H/O cardiac catheterization: 11/29/16 HERBERT placed on LAD  H/O endoscopy: with dilatation of esophageal stricture 2013  S/P cataract surgery: x2; 3-4yr ago  S/P gastroplasty: 28 yrs ago  S/P cholecystectomy: more than 15 years ago  S/P total knee replacement: left, 15yr ago  S/P total knee replacement: right, 12yr ago  S/P hysterectomy: 24yr ago      Antimicrobials:    minocycline 100 milliGRAM(s) Oral two times a day    MEDICATIONS  (STANDING):  acetaminophen  IVPB. 1000 milliGRAM(s) IV Intermittent once  ascorbic acid 500 milliGRAM(s) Oral daily  aspirin enteric coated 81 milliGRAM(s) Oral daily  betamethasone valerate 0.1% Ointment 1 Application(s) Topical two times a day  buDESOnide 160 MICROgram(s)/formoterol 4.5 MICROgram(s) Inhaler 2 Puff(s) Inhalation two times a day  calcium carbonate  625 mG + Vitamin D (OsCal 250 + D) 2 Tablet(s) Oral three times a day  Dakins Solution - 1/4 Strength 1 Application(s) Topical two times a day  folic acid 1 milliGRAM(s) Oral daily  gabapentin 300 milliGRAM(s) Oral three times a day  loratadine 10 milliGRAM(s) Oral daily  melatonin 3 milliGRAM(s) Oral at bedtime  metoprolol tartrate 12.5 milliGRAM(s) Oral two times a day  minocycline 100 milliGRAM(s) Oral two times a day  multivitamin 1 Tablet(s) Oral daily  pantoprazole    Tablet 40 milliGRAM(s) Oral before breakfast  polyethylene glycol 3350 17 Gram(s) Oral two times a day  predniSONE   Tablet 50 milliGRAM(s) Oral daily  simvastatin 20 milliGRAM(s) Oral at bedtime  tiotropium 18 MICROgram(s) Capsule 1 Capsule(s) Inhalation daily      Vital Signs Last 24 Hrs  T(C): 36.6 (07-19-18 @ 21:40), Max: 36.6 (07-19-18 @ 21:40)  T(F): 97.9 (07-19-18 @ 21:40), Max: 97.9 (07-19-18 @ 21:40)  HR: 66 (07-19-18 @ 21:40) (64 - 72)  BP: 125/75 (07-19-18 @ 21:40) (117/81 - 125/76)  BP(mean): --  RR: 18 (07-19-18 @ 21:40) (18 - 18)  SpO2: 98% (07-19-18 @ 21:40) (98% - 98%)    Physical Exam:    Constitutional obese    HEENT PERRLA EOMI,No pallor or icterus    No oral exudate or erythema    Neck supple no JVD or LN    Chest Good AE,CTA    CVS RRR S1 S2 WNl     Abd soft BS normal No tenderness no masses    Ext  right knee ulceration, large clean with second smaller area  right hip ulcer, right inner thigh large ulcer with somewhat indurated borders , right lateral thigh three ulcers and on left two and lower abd wall shallow ulcer    IV site no erythema tenderness or discharge  right knee vac    CNS AAO X 3 , crying at times but mostly appropriate    Lab Data:                          8.6    12.13 )-----------( 357      ( 20 Jul 2018 08:19 )             28.0       07-20    141  |  102  |  35<H>  ----------------------------<  146<H>  4.6   |  24  |  0.74    Ca    9.4      20 Jul 2018 07:19        < from: Xray Knee 1 or 2 Views, Right (07.03.18 @ 12:29) >  EXAM:  KNEE; 1 OR 2 VIEWS - RIGHT                            PROCEDURE DATE:  07/03/2018            INTERPRETATION:      CLINICAL INDICATION: Right knee pain     Exam TECHNIQUE: 2 views of the right knee     COMPARISON: Multiple prior right knee radiographs most recently dated   5/16/2018    FINDINGS:    Patient status post arthrodesis. There is an intramedullary ruthann running   from femur to tibia with surrounding radiopaque cement. Hardware is   intact. No periprosthetic lucency or fracture. Noacute fracture. The   patella is less discretely identified than on prior examination. No   dislocation. There is calcification of arteries in the leg.  No soft   tissue swelling.       IMPRESSION:  Patient status post arthrodesis of the right knee, similar in appearance   to prior examination. No acute complication.    < end of copied text >        Culture - Tissue with Gram Stain (06.13.18 @ 19:06)    -  Gentamicin: S <=1 Should not be used as monotherapy    -  Oxacillin: R >2    -  RIF- Rifampin: S <=1 Should not be used as monotherapy    -  Tetra/Doxy: R >8    -  Trimethoprim/Sulfamethoxazole: S <=0.5/9.5    -  Cefazolin: R <=4    -  Clindamycin: R >4    -  Erythromycin: R >4    Gram Stain:   No WBC's seen. per low power field  No organisms seen per oil power field    -  Penicillin: R >8    -  Ampicillin/Sulbactam: R <=8/4    -  Vancomycin: S 2    Specimen Source: .Tissue Other, Thigh skin    Culture Results:   Growth in fluid media only Coag Negative Staphylococcus    Organism Identification: Coag Negative Staphylococcus    Organism: Coag Negative Staphylococcus    Method Type: [facility]      Culture - Acid Fast - Tissue w/Smear (06.18.18 @ 21:01)    Specimen Source: .Tissue Other, R buttock skin    Acid Fast Bacilli Smear:   No acid fast bacilli seen by fluorochrome stain    Culture Results:   No growth at 1 week.      Culture - Body Fluid with Gram Stain (03.24.18 @ 03:55)    Gram Stain:   Few polymorphonuclear leukocytes seen per low power field  No organisms seen    -  Levofloxacin: R >4    -  Penicillin: R >8    -  Ampicillin/Sulbactam: R 16/8    -  Daptomycin: S 1    -  Vancomycin: S 2    -  Erythromycin: R >4    -  Clindamycin: R >4    -  Ciprofloxacin: R >2    -  Cefazolin: R >16    -  Oxacillin: R >2    -  Gentamicin: S <=1    -  Linezolid: S 2    -  Moxifloxacin(Aerobic): R >4    -  Trimethoprim/Sulfamethoxazole: S <=0.5/9.5    -  RIF- Rifampin: S <=1    -  Tetra/Doxy: S 2    Specimen Source: .Body Fluid Synovial Fluid-- rt knee    Culture Results:   Rare Methicillin resistant Staphylococcus aureus    Organism Identification: Methicillin resistant Staphylococcus aureus    Organism: Methicillin resistant Staphylococcus aureus    Method Type: [facility]    Surgical Pathology Report (07.11.18 @ 16:30)    Surgical Pathology Report:   ACCESSION No:  [id number]    IRASEMA KOHLER                     1        Surgical Final Report          Final Diagnosis  Right thigh, punch biopsy  - Sparse perivascular lymphocytic infiltrate with few  scattered neutrophils, eosinophils, and mast cells (see note).    Note: Multiple levels are examined. Slight dermal fibrosis is  noted. No fungal organisms are seen with a PAS/D stain;  correlation with cultures is recommended. The findings are not  specific; however, the histopathologic  differential diagnosis includes urticaria. Clinical correlation  is required.    Verified by: Romain José M.D., Dermatopathologist  (Electronic Signature)  Reported on: 07/16/18 12:50 EDT,[address], [address], [place] [address]  _________________________________________________________________    Clinical History  77-year-old female history of CAD, AS, MVR p/w ulcers on LE.  Healing with wound vac. Previous biopsy x5.  Rule out PG vs. infection vs. factitious    Specimen(s) Submitted  1     Punch skin right thigh    Gross Description  The specimen is received in formalin and the specimen container  is labeled: Left thigh. It consists of a 0.4 x 0.3 cm diameter  punch biopsy of skin taken to a depth of 0.5 cm. The epidermis is  tan-white and soft. The specimen is entirely submitted in one  cassette.    In addition to other data that may appear on the specimen  container, the label has been inspected to confirm the presence  of the patient's name and date of birth.    ALINE 07/11/18 19:37

## 2018-07-20 NOTE — PROGRESS NOTE ADULT - SUBJECTIVE AND OBJECTIVE BOX
pain controlled, in good spirits as family visiting today (daughter)  she feels like prednisone helping  dressings changed today, reviewed pics with daughter who showed them to me, shows improvement    afvss  nad  abdominal wound - vac  RLE  thigh - dressing per wound care teams, lateral wound wet to dry per prs/wc teams  knee wound with vac  remainder of wounds dressed by wound care  5/5 ta/ehl/gcs  silt l4-s1  2+ dp pulse    ROS: depressed

## 2018-07-20 NOTE — PROGRESS NOTE ADULT - ASSESSMENT
78 yo F with a PMH of CAD s/p HERBERT, AS s/p TAVR, bradycardia s/p PM, MVR, DVT/PE, and s/p TKR who has recurrent, deep skin ulcers of unknown etiology despite multiple skin biopsies and tissue cultures.    1. Skin ulcers on the lower abdomen and lower extremities. Differential diagnosis includes pyoderma gangrenosum vs. panniculitis vs. urticaria with excoriation vs. atypical mycobacteria  - s/p numerous skin biopsies. The last three biopsies have shown findings consistent with urticaria. One previous biopsy showed findings of an abscess, which could be consistent with pyoderma gangrenosum  - No sign of cutaneous infection on prior biopsies.   - One lesion on the R lateral thigh was with treated with ILK 40 without improvement  - Given findings of urticaria and stable/improving wounds on the wound vac, would recommend trimming of patients nails as there is chance she is using them to dig into her skin. Would also place wound vacs/special dressings on the new lesions and insure only medical personnel are manipulating the dressings and wound vac.  - Patient was cleared by infectious disease for prednisone use. Pyoderma gangrenosum remains on the differential diagnosis. Continue empiric Prednisone 50 mg QD. Response or lack of response will be very informative in establishing a diagnosis of PG. Given tentative improvement, may consider eventual switch to a steroid sparing agent, such as Cyclosporine  - Patient was seen and discussed with Dr. Vigil. Will follow along with you. 76 yo F with a PMH of CAD s/p HERBERT, AS s/p TAVR, bradycardia s/p PM, MVR, DVT/PE, and s/p TKR who has recurrent, deep skin ulcers of unknown etiology despite multiple skin biopsies and tissue cultures.    1. Skin ulcers on the lower abdomen and lower extremities. Differential diagnosis includes pyoderma gangrenosum vs. panniculitis vs. urticaria with excoriation vs. atypical mycobacteria  - s/p numerous skin biopsies. The last three biopsies have shown findings consistent with urticaria. One previous biopsy showed findings of an abscess, which could be consistent with pyoderma gangrenosum  - No sign of cutaneous infection on prior biopsies.   - One lesion on the R lateral thigh was with treated with ILK 40 without improvement  - Given findings of urticaria and stable/improving wounds on the wound vac, would recommend trimming of patients nails as there is chance she is using them to dig into her skin. Would also place wound vacs/special dressings on the new lesions and insure only medical personnel are manipulating the dressings and wound vac.  - Patient was cleared by infectious disease for prednisone use. Pyoderma gangrenosum remains on the differential diagnosis. Continue empiric Prednisone 50 mg QD. Response or lack of response will be very informative in establishing a diagnosis of PG. Given tentative improvement, may consider eventual switch to a steroid sparing agent, such as Cyclosporine or infliximab  - Patient was seen and discussed with Dr. Vigil. Will follow along with you.

## 2018-07-20 NOTE — PROGRESS NOTE ADULT - SUBJECTIVE AND OBJECTIVE BOX
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    Patient was recently admitted from 4/9/18 - 6/6/18. Patient was initially presented for right total knee spacer exchange and I&D with complex wound closure. Previously had a spacer exchanged performed on 3/9 and then was discharged to rehab. Patient then returned on 4/9 with fever and concern for sepsis, found to have positive cultures from knee for MRSA as well as multifocal pneumonia. During admission, patient completed course of Daptomycin for MRSA prosthesis infection and was also treated for multifocal PNA with ?aztreonam. Patient had extensive wound care, followed by Surgery/Plastics/Orthopedics/Id and was discharged to rehab on 6/6 with suppressive minocycline. Admission complicated by lesion over thigh and lower pannus requiring wound vacs.  UA On 6/5 positive, but no culture sent and no antibiotics started.      Patient presents from rehab with fevers and lethargy shortly after discharge. In the ED, Tmax 100.6, HR 85,  /62, O2 98% on 2L NC. Labs showed mild leukocytosis with WBC 13.8, stable anemia Hgb 9.6, and normal platelets 319. Metabolic panel overall unremarkable with Cr 0.89. LFTs unremarkable. VBG with lactate 1.3. UA again positive with >50 WBCs, few bacteria, turbid appearance, and large LE.     Patient had a CT abdomen/pelvis that showed evidence of possible distal appendicitis. Evaluated by Surgery and family declining operative intervention due to high risk. Plastics to aid with wound management, to apply wound vac again in AM to thigh and pannus. Patient not felt to have evidence of wound infection. (07 Jun 2018 22:19)    I saw the pt several times on the last admit. and she had a ferritin that was over 400 and was given at times procrit when the hemoglobin got into the 7 range. The pt has a inflammatory anemia and the hemoglobin now is about 8.6 and she is normocytic hypochromic with a high rdw. Will at this time follow the pt and I see no need now for an extensive workup for this anemia. Will support as needed. 7/12 met with pt and aid at bedside and explained approach to the anemia no need for procrit. 7/16 notes reviewed from the last few days and the hemoglobin was 8.4. 7/18 case discussed with Dr Joseph and steroids may need to be given. the hemoglobin was noted to be 8.9 and epo will be on hold for now.     7/20 was asked by Dr Joseph to comment on the possibility of the pt having coumadin necrosis. It is a very rare occurrence and generally occurs acutely. The cause of coumadin necrosis would occur almost immediately in pts with a low protein s and c level. Both of these are vitamin k dependant factors and with the start of coumadin both of these factors would decrease. The protein s and c are modulators of factor 5 and would on initiation cause local ulcers. Pts like this would also have had clots in the past. The pts is not likely to have this with her history here. Obtaining a protein s and c levels here would not be helpful as these levels will be low on the coumadin. It would take 3 weeks or so off the coumadin for these levels to come up to baseline. Since this a rare case we might consider stopping the warfarin and give the pt a heparin derivative. Antiphospholipid antibody syndrome is not likely in the setting of normal platelets normal ptt in the past and no history of arterial and venous clots, no heart valve clots etc.  Vital Signs Last 24 Hrs  T(C): 36.4 (16 Jul 2018 09:46), Max: 36.7 (15 Jul 2018 14:00)  T(F): 97.5 (16 Jul 2018 09:46), Max: 98.1 (16 Jul 2018 06:17)  HR: 63 (16 Jul 2018 09:46) (63 - 78)  BP: 126/64 (16 Jul 2018 09:46) (115/64 - 154/84)  BP(mean): --  RR: 18 (16 Jul 2018 09:46) (18 - 20)  SpO2: 100% (16 Jul 2018 09:46) (94% - 100%)  MEDICATIONS  (STANDING):  ALPRAZolam 0.25 milliGRAM(s) Oral once  ascorbic acid 500 milliGRAM(s) Oral daily  aspirin enteric coated 81 milliGRAM(s) Oral daily  betamethasone valerate 0.1% Ointment 1 Application(s) Topical two times a day  buDESOnide 160 MICROgram(s)/formoterol 4.5 MICROgram(s) Inhaler 2 Puff(s) Inhalation two times a day  calcium carbonate 1250 mG + Vitamin D (OsCal 500 + D) 1 Tablet(s) Oral three times a day  Dakins Solution - 1/4 Strength 1 Application(s) Topical two times a day  folic acid 1 milliGRAM(s) Oral daily  gabapentin 300 milliGRAM(s) Oral three times a day  loratadine 10 milliGRAM(s) Oral daily  melatonin 3 milliGRAM(s) Oral at bedtime  metoprolol tartrate 12.5 milliGRAM(s) Oral two times a day  minocycline 100 milliGRAM(s) Oral two times a day  multivitamin 1 Tablet(s) Oral daily  pantoprazole    Tablet 40 milliGRAM(s) Oral before breakfast  polyethylene glycol 3350 17 Gram(s) Oral two times a day  simvastatin 20 milliGRAM(s) Oral at bedtime  tiotropium 18 MICROgram(s) Capsule 1 Capsule(s) Inhalation daily    MEDICATIONS  (PRN):  acetaminophen   Tablet. 650 milliGRAM(s) Oral every 6 hours PRN Mild Pain (1 - 3)  bisacodyl Suppository 10 milliGRAM(s) Rectal daily PRN Constipation  bismuth subsalicylate Liquid 30 milliLiter(s) Oral every 6 hours PRN Cramping/abd pain  HYDROmorphone   Tablet 2 milliGRAM(s) Oral every 4 hours PRN Severe Pain (7 - 10)  magnesium hydroxide Suspension 30 milliLiter(s) Oral daily PRN Constipation  senna 2 Tablet(s) Oral at bedtime PRN Constipation  simethicone 80 milliGRAM(s) Chew two times a day PRN Gas    LABS:                   07-11    141  |  99  |  30<H>  ----------------------------<  123<H>  4.3   |  29  |  0.81    Ca    9.6      11 Jul 2018 07:11      PT/INR - ( 11 Jul 2018 08:08 )   PT: 15.5 sec;   INR: 1.36 ratio         PTT - ( 11 Jul 2018 08:08 )  PTT:36.8 sec      ROS:  Negative except for:    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  Artificial cardiac pacemaker: 12/25/17  H/O cardiac catheterization: 11/29/16 HERBERT placed on LAD  H/O endoscopy: with dilatation of esophageal stricture 2013  S/P cataract surgery: x2; 3-4yr ago  S/P gastroplasty: 28 yrs ago  S/P cholecystectomy: more than 15 years ago  S/P total knee replacement: left, 15yr ago  S/P total knee replacement: right, 12yr ago  S/P hysterectomy: 24yr ago      SOCIAL HISTORY:    FAMILY HISTORY:  No pertinent family history in first degree relatives      Allergies    amoxicillin (Other)    Intolerances    IV Contrast (Flushing (Skin); Nausea)      PHYSICAL EXAM as per attending and she was in pain being cared for by the staff at the time of my visit.  General: adult in NAD  HEENT: clear oropharynx, anicteric sclera, pink conjunctivae  Neck: supple  CV: normal S1S2 with no murmur rubs or gallops  Lungs: clear to auscultation, no wheezes, no rhales  Abdomen: soft non-tender non-distended, no hepato/splenomegaly  Ext: no clubbing cyanosis or edema  Skin: no rashes and no petichiae  Neuro: alert and oriented X3 no focal deficits    BLOOD SMEAR INTERPRETATION:    RADIOLOGY :

## 2018-07-20 NOTE — PROGRESS NOTE ADULT - ASSESSMENT
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 Pt completed 10 days meropenem.     # abd wound, multiple bl thigh wounds  pt with new R thigh lesion, sp punch biopsy by derm 7/11, positive for urticaria, not consistent with PG  prior biopsies were not consistent with pyoderma gangrenosum, however, pt continues to develop new ulcers  6/22 left thigh and left arm punch biopsies consistent with urticaria as well. evaluated by allergy.  tissue culture grew coag neg staph likely colonized  IAs multiple biopsies have not been pos for PG,  7/13 sp intralesional kenalog lesion on right thigh, which did not show improvement after 3 days  continue vac to abdomen, rt Knee  appreciate wound care recs, daily dressing changes, wound vac changes every other day  Trial of steroids started D#3 - to monitor   d/w ID, plastics, wound care team      # Pain control  we have not been able to secure pain management consult, nursing manager is aware  Mr Rdz and Son Thony ask to try to decrease dilaudid  pt still with pain that prohibits dressing changes, so will cont dilaudid 2mg q4 PRN for severe pain  try IV tylenol 1g qd PRN for moderate pain  try to avoid IV dilaudid, but okay to give 0.5mg IV before wound changes if necessary  yesterday tramadol 25mg given for pain, can cont this in efforts to try to avoid dilaudid  gabapentin 300mg tid  Dgtr requested xanax 0.25 prior to dressing change  bowel regimen    # polymyalgia rheumatica  steroids have been on hold to allow for wound healing  no signs of active rheum dz, evaluated by rheum 6/29    # TKR wound-mrsa  chronic minocycline suppression      # chr Anemia  stable, monitor h/h closely  appreciate heme recs    # S/P TAVR (transcatheter aortic valve replacement)  daily coumadin dosing, daily INR    # adjustment disorder with depressed mood  xanax prn        plan of care dw patient and aid at bedside

## 2018-07-20 NOTE — PROGRESS NOTE ADULT - ASSESSMENT
A/P: s/p Right total knee insertion of spacer, irrigation and debridement, complex wound closure 3/23; readmitted 6/8 for appendicitis    - D/w primary team regarding pain consult  - cont vac/dressing changes to R knee, thigh, and abdomen M/W/F  - remainder of wounds per wound care/derm  - ID following  - ok for discharge from [facility] perspective    Plastic Surgery (pg TOYA: [id number], NS: [phone])

## 2018-07-20 NOTE — PROGRESS NOTE ADULT - SUBJECTIVE AND OBJECTIVE BOX
INTERVAL HPI/OVERNIGHT EVENTS:  No acute events overnight. No new lesions. Lesions remain painful. Patient's aide, Lissa, reports significant drying of lesions but the lesion on the R thigh is unchanged.    HPI:    76 yo F with a PMH of CAD s/p HERBERT, AS s/p TAVR, bradycardia s/p PM, MVR, DVT/PE, and s/p TKR who presented on 6/7 with appendicitis.  Dermatology has been following her for ulcers since 5/23/18 during her previous admission. We are re-consulted for a new lesion on the R thigh. Painful. Noticed on Monday. Growing. Worried that this too will ulcerate. Otherwise well.    When Pt was initially consulted in late May for R knee ulcer, the ulcer was already debrided and it was difficult to understand how the ulcer initially arose. At the time of the initial, pt already had wound VAC placed in lower abdomen and R anterior thigh. Over the course of the month, pt developed new ulcers and lesions.    Biopsies were obtained from ulcers on R medial knee on May 23 (debrided prior to biopsy) and L inner thigh on Jun 13 (new ulcer) along with tissue cultures. The biopsies showed mixed inflammatory patterns. Bacterial tissue cx's were reviewed by ID, and they were thought to be results of colonization. Mycobacteria and fungal cx were negative. Another tissue cx obtained from subcutaneous tissue from R lateral thigh on Jun 18 to rule out atypical mycobacteria has been negative.    Another biopsy from L wrist and L thigh (pt's daughter reported the thigh lesion appeared to be how all the ulcers started) on Jun 22 revealed urticaria and excoriation.      Seen with her aid, Lissa.     MEDICATIONS  (STANDING):  ALPRAZolam 0.25 milliGRAM(s) Oral once  ascorbic acid 500 milliGRAM(s) Oral daily  aspirin enteric coated 81 milliGRAM(s) Oral daily  betamethasone valerate 0.1% Ointment 1 Application(s) Topical two times a day  buDESOnide 160 MICROgram(s)/formoterol 4.5 MICROgram(s) Inhaler 2 Puff(s) Inhalation two times a day  calcium carbonate 1250 mG + Vitamin D (OsCal 500 + D) 1 Tablet(s) Oral three times a day  Dakins Solution - 1/4 Strength 1 Application(s) Topical two times a day  folic acid 1 milliGRAM(s) Oral daily  gabapentin 300 milliGRAM(s) Oral three times a day  loratadine 10 milliGRAM(s) Oral daily  melatonin 3 milliGRAM(s) Oral at bedtime  metoprolol tartrate 12.5 milliGRAM(s) Oral two times a day  minocycline 100 milliGRAM(s) Oral two times a day  multivitamin 1 Tablet(s) Oral daily  pantoprazole    Tablet 40 milliGRAM(s) Oral before breakfast  polyethylene glycol 3350 17 Gram(s) Oral two times a day  simvastatin 20 milliGRAM(s) Oral at bedtime  tiotropium 18 MICROgram(s) Capsule 1 Capsule(s) Inhalation daily  warfarin 1 milliGRAM(s) Oral once    MEDICATIONS  (PRN):  acetaminophen   Tablet. 650 milliGRAM(s) Oral every 6 hours PRN Mild Pain (1 - 3)  bisacodyl Suppository 10 milliGRAM(s) Rectal daily PRN Constipation  bismuth subsalicylate Liquid 30 milliLiter(s) Oral every 6 hours PRN Cramping/abd pain  HYDROmorphone   Tablet 4 milliGRAM(s) Oral every 4 hours PRN Severe Pain (7 - 10)  HYDROmorphone   Tablet 2 milliGRAM(s) Oral every 3 hours PRN Moderate Pain (4 - 6)  magnesium hydroxide Suspension 30 milliLiter(s) Oral daily PRN Constipation  senna 2 Tablet(s) Oral at bedtime PRN Constipation  simethicone 80 milliGRAM(s) Chew two times a day PRN Gas      Allergies    amoxicillin (Other)    Intolerances    IV Contrast (Flushing (Skin); Nausea)      REVIEW OF SYSTEMS      General: no fevers/chills, no NS	    Skin: see HPI  	  Ophthalmologic: no eye pain or change in vision    Genitourinary: no dysuria or hematuria    Musculoskeletal: no joint pains or weakness	    Neurological:no weakness or tingling          Vital Signs Last 24 Hrs  T(C): 36.4 (13 Jul 2018 09:02), Max: 37.1 (12 Jul 2018 17:38)  T(F): 97.6 (13 Jul 2018 09:02), Max: 98.7 (12 Jul 2018 17:38)  HR: 75 (13 Jul 2018 09:02) (75 - 86)  BP: 109/71 (13 Jul 2018 09:02) (109/71 - 115/78)  BP(mean): --  RR: 18 (13 Jul 2018 09:02) (18 - 18)  SpO2: 92% (13 Jul 2018 09:02) (92% - 98%)    PHYSICAL EXAM:   The patient was alert and oriented X 3, well nourished, and in no  apparent distress.  There was no visible lymphadenopathy.  Conjunctiva were non injected  There was no clubbing or edema of extremities.  There was no hyperhidrosis or bromhidrosis.    Of note on skin exam:   R lateral thigh with 4 cm linear, deep, well circumscribed ulcer and 2 x 2 cm, well circumscribed, full thickness ulcer with fibrinous base and surrounding erythema    L lateral thigh: 2 cm oval ulcer, well-demarcated (improving)    Wound vac on lower abdomen, R thigh. L thigh    LABS:                        8.9    10.36 )-----------( 291      ( 13 Jul 2018 09:30 )             28.7     07-13    139  |  98  |  27<H>  ----------------------------<  124<H>  4.3   |  29  |  0.87    Ca    10.0      13 Jul 2018 07:17      PT/INR - ( 13 Jul 2018 08:02 )   PT: 30.3 sec;   INR: 2.63 ratio         PTT - ( 12 Jul 2018 08:18 )  PTT:39.1 sec      RADIOLOGY & ADDITIONAL TESTS:

## 2018-07-20 NOTE — PROGRESS NOTE ADULT - ASSESSMENT
76 yo female with morbid obesity.RA,OA,asthma,PMR,s/p TAVR ,PPM, and ? MVR  Failed rt TKR, explant, strep was initial pathogen, MRSA more recent pathogen  S/P joint explant.S/P plastics coverage with wound breakdown.  Multiple wounds.Admitted 6/7  with low grade fever and appendicitis seen on CT scan..S/P 1 week of meropenem.No surgery planned.  PG  has been a concern, s/p 2 biopsies of skin, path not suggestive.Derm does not feel she has PG.  Her AFB knee cultures in December were negative, May 23rd tissue AFB culture negative as well.All 3 AFB smears were negative.      Skin ulcers on the lower abdomen and lower extremities. Differential diagnosis includes pyoderma gangrenosum vs. urticaria with self-inflicted skin injury. Other thoughts include Sweets ( biopsy does not show this) or calciphylaxis (but not the right patient for this) or pannicluitis ( such as Justin Kennedy) - but not proven on biopsy or perhaps some other casue for fat necroiss. In discussing with Dr Mace we were wondering if this could be related to the minocycline somehow- but we haven't seen prior reports of such but will invstigate further.  - s/p numerous skin biopsies, etiology remains unclear as the biopsies, including the biopsy from last week, have been non-diagnostic. However, the last three biopsies have shown findings consistent with urticaria. One previous biopsy showed findings of an abscess, which could be consistent with pyoderma gangrenosum  - No sign of cutaneous infection on prior biopsies.   - Al cultures including AFB and fungal are negative so far.    Wounds inspected today [facility] PT, plastics and Dr Mai and Dr Murphy  According to those who have sen wounds before , the patient has already shown some improvement with the steroids      Dr Mace and Dr Mai and their associates will continue to follow and monitor the patient    I will be happy to look in periodically and work with them , as a second opinion if I can be of assistance  Repeat fungal and AFB cultures were sent.    thank you    Nette Stinson M.D. ,   Pager [phone]     after 5PM/ weekends [phone]

## 2018-07-20 NOTE — PROGRESS NOTE ADULT - SUBJECTIVE AND OBJECTIVE BOX
CC: f/u for MRSA knee infection and multiple spontaneous wounds    Patient reports pain with dressing changes    REVIEW OF SYSTEMS:  All other review of systems negative (Comprehensive ROS)    Antimicrobials Day #  :long term  minocycline 100 milliGRAM(s) Oral two times a day    Other Medications Reviewed    T(F): 97.6 (07-20-18 @ 14:46), Max: 98 (07-20-18 @ 11:04)  HR: 67 (07-20-18 @ 17:07)  BP: 150/89 (07-20-18 @ 17:07)  RR: 18 (07-20-18 @ 14:46)  SpO2: 97% (07-20-18 @ 14:46)  Wt(kg): --    PHYSICAL EXAM:  General: alert, no acute distress  Eyes:  anicteric, no conjunctival injection, no discharge  Oropharynx: no lesions or injection 	  Neck: supple, without adenopathy  Lungs: clear to auscultation  Heart: regular rate and rhythm; no murmur, rubs or gallops  Abdomen: soft, nondistended, nontender, without mass or organomegaly.Wound vac in place  Skin: multiple lesions as described in derm,plastics note  Extremities: no clubbing, cyanosis, or edema  Neurologic: alert, oriented, moves all extremities  RT knee wound is healing, clean, superficial.Wound vac x 2 with superficial clean wounds.Rt hip/flank wound with 3 areas of breakdown, no pus, wound edges slightly necrotic  LAB RESULTS:                        8.6    12.13 )-----------( 357      ( 20 Jul 2018 08:19 )             28.0     07-20    141  |  102  |  35<H>  ----------------------------<  146<H>  4.6   |  24  |  0.74    Ca    9.4      20 Jul 2018 07:19          MICROBIOLOGY:  RECENT CULTURES:      RADIOLOGY REVIEWED:

## 2018-07-20 NOTE — PROGRESS NOTE ADULT - ASSESSMENT
Morbid Obesity  PMR  Failed RT TKA with initial strep infection followed by MRSA infected spacer  Her post op wound is improving, major wound problem is now spontaneous areas of fat necrosis  Derm input appreciated  Case reviewed with Dr Stinson earlier  Wounds examined earlier today at dressing change.  Suggest:  1continue mcn for rt spacer MRSA infection  2.Steroid trial for possible PG acknowledging risks  3.Repeat culture for AFB/fungus sent although prior biopsies have been negative  4.Daughter present at dressing change  5.eventual steroid sparing regimen if improvement seen with steroids

## 2018-07-20 NOTE — PROGRESS NOTE ADULT - ASSESSMENT
7/20 was asked by Dr Joseph to comment on the possibility of the pt having coumadin necrosis. It is a very rare occurrence and generally occurs acutely. The cause of coumadin necrosis would occur almost immediately in pts with a low protein s and c level. Both of these are vitamin k dependant factors and with the start of coumadin both of these factors would decrease. The protein s and c are modulators of factor 5 and would on initiation cause local ulcers. Pts like this would also have had clots in the past. The pts is not likely to have this with her history here. Obtaining a protein s and c levels here would not be helpful as these levels will be low on the coumadin. It would take 3 weeks or so off the coumadin for these levels to come up to baseline. Since this a rare case we might consider stopping the warfarin and give the pt a heparin derivative. Antiphospholipid antibody syndrome is not likely in the setting of normal platelets normal ptt in the past and no history of arterial and venous clots, no heart valve clots etc.

## 2018-07-21 LAB
HCT VFR BLD CALC: 28.4 % — LOW (ref 34.5–45)
HGB BLD-MCNC: 8.4 G/DL — LOW (ref 11.5–15.5)
INR BLD: 2.75 RATIO — HIGH (ref 0.88–1.16)
MCHC RBC-ENTMCNC: 25.5 PG — LOW (ref 27–34)
MCHC RBC-ENTMCNC: 29.6 GM/DL — LOW (ref 32–36)
MCV RBC AUTO: 86.3 FL — SIGNIFICANT CHANGE UP (ref 80–100)
NIGHT BLUE STAIN TISS: SIGNIFICANT CHANGE UP
PLATELET # BLD AUTO: 372 K/UL — SIGNIFICANT CHANGE UP (ref 150–400)
PROTHROM AB SERPL-ACNC: 31.7 SEC — HIGH (ref 10–13.1)
RBC # BLD: 3.29 M/UL — LOW (ref 3.8–5.2)
RBC # FLD: 16.8 % — HIGH (ref 10.3–14.5)
SPECIMEN SOURCE: SIGNIFICANT CHANGE UP
WBC # BLD: 10.5 K/UL — SIGNIFICANT CHANGE UP (ref 3.8–10.5)
WBC # FLD AUTO: 10.5 K/UL — SIGNIFICANT CHANGE UP (ref 3.8–10.5)

## 2018-07-21 RX ORDER — WARFARIN SODIUM 2.5 MG/1
1 TABLET ORAL ONCE
Qty: 0 | Refills: 0 | Status: COMPLETED | OUTPATIENT
Start: 2018-07-21 | End: 2018-07-21

## 2018-07-21 RX ADMIN — Medication 2 TABLET(S): at 22:34

## 2018-07-21 RX ADMIN — HYDROMORPHONE HYDROCHLORIDE 0.5 MILLIGRAM(S): 2 INJECTION INTRAMUSCULAR; INTRAVENOUS; SUBCUTANEOUS at 12:00

## 2018-07-21 RX ADMIN — BUDESONIDE AND FORMOTEROL FUMARATE DIHYDRATE 2 PUFF(S): 160; 4.5 AEROSOL RESPIRATORY (INHALATION) at 06:24

## 2018-07-21 RX ADMIN — GABAPENTIN 300 MILLIGRAM(S): 400 CAPSULE ORAL at 06:17

## 2018-07-21 RX ADMIN — Medication 3 MILLIGRAM(S): at 22:34

## 2018-07-21 RX ADMIN — GABAPENTIN 300 MILLIGRAM(S): 400 CAPSULE ORAL at 22:34

## 2018-07-21 RX ADMIN — GABAPENTIN 300 MILLIGRAM(S): 400 CAPSULE ORAL at 13:59

## 2018-07-21 RX ADMIN — LORATADINE 10 MILLIGRAM(S): 10 TABLET ORAL at 11:46

## 2018-07-21 RX ADMIN — POLYETHYLENE GLYCOL 3350 17 GRAM(S): 17 POWDER, FOR SOLUTION ORAL at 06:17

## 2018-07-21 RX ADMIN — Medication 500 MILLIGRAM(S): at 11:46

## 2018-07-21 RX ADMIN — HYDROMORPHONE HYDROCHLORIDE 0.5 MILLIGRAM(S): 2 INJECTION INTRAMUSCULAR; INTRAVENOUS; SUBCUTANEOUS at 23:00

## 2018-07-21 RX ADMIN — Medication 12.5 MILLIGRAM(S): at 17:55

## 2018-07-21 RX ADMIN — MINOCYCLINE HYDROCHLORIDE 100 MILLIGRAM(S): 45 TABLET, EXTENDED RELEASE ORAL at 06:17

## 2018-07-21 RX ADMIN — PANTOPRAZOLE SODIUM 40 MILLIGRAM(S): 20 TABLET, DELAYED RELEASE ORAL at 06:17

## 2018-07-21 RX ADMIN — WARFARIN SODIUM 1 MILLIGRAM(S): 2.5 TABLET ORAL at 22:33

## 2018-07-21 RX ADMIN — MINOCYCLINE HYDROCHLORIDE 100 MILLIGRAM(S): 45 TABLET, EXTENDED RELEASE ORAL at 17:55

## 2018-07-21 RX ADMIN — Medication 0.25 MILLIGRAM(S): at 22:33

## 2018-07-21 RX ADMIN — BUDESONIDE AND FORMOTEROL FUMARATE DIHYDRATE 2 PUFF(S): 160; 4.5 AEROSOL RESPIRATORY (INHALATION) at 17:55

## 2018-07-21 RX ADMIN — HYDROMORPHONE HYDROCHLORIDE 0.5 MILLIGRAM(S): 2 INJECTION INTRAMUSCULAR; INTRAVENOUS; SUBCUTANEOUS at 23:15

## 2018-07-21 RX ADMIN — SIMVASTATIN 20 MILLIGRAM(S): 20 TABLET, FILM COATED ORAL at 22:34

## 2018-07-21 RX ADMIN — Medication 650 MILLIGRAM(S): at 14:29

## 2018-07-21 RX ADMIN — Medication 1 APPLICATION(S): at 12:30

## 2018-07-21 RX ADMIN — Medication 1 TABLET(S): at 11:45

## 2018-07-21 RX ADMIN — POLYETHYLENE GLYCOL 3350 17 GRAM(S): 17 POWDER, FOR SOLUTION ORAL at 17:54

## 2018-07-21 RX ADMIN — MAGNESIUM HYDROXIDE 30 MILLILITER(S): 400 TABLET, CHEWABLE ORAL at 11:47

## 2018-07-21 RX ADMIN — Medication 50 MILLIGRAM(S): at 11:45

## 2018-07-21 RX ADMIN — Medication 1 APPLICATION(S): at 22:36

## 2018-07-21 RX ADMIN — Medication 1 APPLICATION(S): at 06:27

## 2018-07-21 RX ADMIN — Medication 81 MILLIGRAM(S): at 11:44

## 2018-07-21 RX ADMIN — Medication 650 MILLIGRAM(S): at 13:59

## 2018-07-21 RX ADMIN — Medication 12.5 MILLIGRAM(S): at 06:17

## 2018-07-21 RX ADMIN — Medication 1 MILLIGRAM(S): at 11:45

## 2018-07-21 RX ADMIN — Medication 1 APPLICATION(S): at 18:31

## 2018-07-21 RX ADMIN — Medication 2 TABLET(S): at 06:17

## 2018-07-21 RX ADMIN — TIOTROPIUM BROMIDE 1 CAPSULE(S): 18 CAPSULE ORAL; RESPIRATORY (INHALATION) at 11:45

## 2018-07-21 RX ADMIN — Medication 2 TABLET(S): at 13:58

## 2018-07-21 NOTE — CHART NOTE - NSCHARTNOTEFT_GEN_A_CORE
Source: Patient [X ]    Family [ ]     other [X ] Medical record, Aide at bedside     Diet : Regular, Howard 2 packets/daily to provide an additional 160 Kcal and 28 Gm amino acids to support collagen formation , Nepro-2 per day (Provides additional 850kcal, 38grams protein per day)     Nutrition consult received for nutrition support. Medical chart reviewed/events noted. Pt reports stable appetite and PO intake inhouse, consuming 50 % of meals. Pt reports drinking 1 Nepro daily and 1 Howard daily. Pt denies GI distress. Obtained additional food preferences to optimize intake. Encouraged PO intake. Discussed small, frequent meals high in protein and energy. Discussed lean sources of protein and protein/energy dense snacks.        PO intake:  < 50% [ ] 50-75% [x ]   % [ ]  other :     Source for PO intake [ x] Patient [ ] family [ ] chart [ ] staff [ ] other        Current Weight: (7/18) 219.8 pounds , dosing (6/27) 215.4 pounds    % Weight Change    Pertinent Medications: MEDICATIONS  (STANDING):  ascorbic acid 500 milliGRAM(s) Oral daily  aspirin enteric coated 81 milliGRAM(s) Oral daily  betamethasone valerate 0.1% Ointment 1 Application(s) Topical two times a day  buDESOnide 160 MICROgram(s)/formoterol 4.5 MICROgram(s) Inhaler 2 Puff(s) Inhalation two times a day  calcium carbonate  625 mG + Vitamin D (OsCal 250 + D) 2 Tablet(s) Oral three times a day  Dakins Solution - 1/4 Strength 1 Application(s) Topical two times a day  folic acid 1 milliGRAM(s) Oral daily  gabapentin 300 milliGRAM(s) Oral three times a day  loratadine 10 milliGRAM(s) Oral daily  melatonin 3 milliGRAM(s) Oral at bedtime  metoprolol tartrate 12.5 milliGRAM(s) Oral two times a day  minocycline 100 milliGRAM(s) Oral two times a day  multivitamin 1 Tablet(s) Oral daily  pantoprazole    Tablet 40 milliGRAM(s) Oral before breakfast  polyethylene glycol 3350 17 Gram(s) Oral two times a day  predniSONE   Tablet 50 milliGRAM(s) Oral daily  simvastatin 20 milliGRAM(s) Oral at bedtime  tiotropium 18 MICROgram(s) Capsule 1 Capsule(s) Inhalation daily    MEDICATIONS  (PRN):  acetaminophen   Tablet. 650 milliGRAM(s) Oral every 6 hours PRN Mild Pain (1 - 3)  ALPRAZolam 0.25 milliGRAM(s) Oral daily PRN prior to dressing change ---anxiety  bisacodyl Suppository 10 milliGRAM(s) Rectal daily PRN Constipation  bismuth subsalicylate Liquid 30 milliLiter(s) Oral every 6 hours PRN Cramping/abd pain  HYDROmorphone  Injectable 0.5 milliGRAM(s) IV Push every 12 hours PRN please give before wound changes  magnesium hydroxide Suspension 30 milliLiter(s) Oral daily PRN Constipation  senna 2 Tablet(s) Oral at bedtime PRN Constipation  simethicone 80 milliGRAM(s) Chew two times a day PRN Gas    Pertinent Labs:  07-20 Na141 mmol/L Glu 146 mg/dL<H> K+ 4.6 mmol/L Cr  0.74 mg/dL BUN 35 mg/dL<H>      Skin: +4 generalized edema, multiple wounds noted    Estimated Needs:   [x ] no change since previous assessment  [ ] recalculated:       Previous Nutrition Diagnosis:      [x ] Malnutrition , moderate          Nutrition Diagnosis is [x ] ongoing          New Nutrition Diagnosis: [x ] not applicable    Recommend  1) Decrease to 1 Nepro daily and 1 Howard daily per pt's request  2) Provide food preferences as requested by Pt/family within diet restrictions  and continue to encourage PO intake during meal times  3) RD to provide food preferences      Monitoring and Evaluation:     1.Continue to monitor weight, po intake, labs, and GI tolerance  2. Encourage adequate po intake   3. RD remains available.  Valerie Solares RD pager #[phone]

## 2018-07-21 NOTE — PROGRESS NOTE ADULT - SUBJECTIVE AND OBJECTIVE BOX
Patient is a 77y old  Female who presents with a chief complaint of fever, lethargy (07 Jun 2018 22:19)      INTERVAL HPI/OVERNIGHT EVENTS: noted, feels cold, afebrile, room temp optimal      Vital Signs Last 24 Hrs  T(C): 36.4 (21 Jul 2018 07:15), Max: 36.7 (20 Jul 2018 11:04)  T(F): 97.6 (21 Jul 2018 07:15), Max: 98.1 (20 Jul 2018 21:27)  HR: 65 (21 Jul 2018 07:15) (60 - 67)  BP: 148/80 (21 Jul 2018 07:15) (119/74 - 150/89)  BP(mean): --  RR: 18 (21 Jul 2018 07:15) (18 - 18)  SpO2: 100% (21 Jul 2018 07:15) (95% - 100%)    acetaminophen   Tablet. 650 milliGRAM(s) Oral every 6 hours PRN  ALPRAZolam 0.25 milliGRAM(s) Oral daily PRN  ascorbic acid 500 milliGRAM(s) Oral daily  aspirin enteric coated 81 milliGRAM(s) Oral daily  betamethasone valerate 0.1% Ointment 1 Application(s) Topical two times a day  bisacodyl Suppository 10 milliGRAM(s) Rectal daily PRN  bismuth subsalicylate Liquid 30 milliLiter(s) Oral every 6 hours PRN  buDESOnide 160 MICROgram(s)/formoterol 4.5 MICROgram(s) Inhaler 2 Puff(s) Inhalation two times a day  calcium carbonate  625 mG + Vitamin D (OsCal 250 + D) 2 Tablet(s) Oral three times a day  Dakins Solution - 1/4 Strength 1 Application(s) Topical two times a day  folic acid 1 milliGRAM(s) Oral daily  gabapentin 300 milliGRAM(s) Oral three times a day  HYDROmorphone  Injectable 0.5 milliGRAM(s) IV Push every 12 hours PRN  loratadine 10 milliGRAM(s) Oral daily  magnesium hydroxide Suspension 30 milliLiter(s) Oral daily PRN  melatonin 3 milliGRAM(s) Oral at bedtime  metoprolol tartrate 12.5 milliGRAM(s) Oral two times a day  minocycline 100 milliGRAM(s) Oral two times a day  multivitamin 1 Tablet(s) Oral daily  pantoprazole    Tablet 40 milliGRAM(s) Oral before breakfast  polyethylene glycol 3350 17 Gram(s) Oral two times a day  predniSONE   Tablet 50 milliGRAM(s) Oral daily  senna 2 Tablet(s) Oral at bedtime PRN  simethicone 80 milliGRAM(s) Chew two times a day PRN  simvastatin 20 milliGRAM(s) Oral at bedtime  tiotropium 18 MICROgram(s) Capsule 1 Capsule(s) Inhalation daily      PHYSICAL EXAM:  GENERAL: NAD,   EYES: conjunctiva and sclera clear  ENMT: Moist mucous membranes  NECK: Supple, No JVD, Normal thyroid  NERVOUS SYSTEM:  Alert & Oriented X3,   CHEST/LUNG: Clear to auscultation bilaterally; No rales, rhonchi, wheezing, or rubs  HEART: Regular rate and rhythm; No murmurs, rubs, or gallops  ABDOMEN: Soft, Nontender, Nondistended; Bowel sounds present  EXTREMITIES:Wounds  1)Lower abd wound -healing well  2)rt knee-vac wound improved healing  3)rt medial thigh wound- margins indurated, healing slowly  4)Rt lateral thigh/buttock wound-large deep-necrotic tissue-packing done  5)rt lateral thigh 2 small holes-wound packing done  6)Lt lateral /post thigh: 2-3 small punched out ulcers-wound packing done          Consultant(s) Notes Reviewed:  [x ] YES  [ ] NO  Care Discussed with Consultants/Other Providers [ x] YES  [ ] NO    LABS:                        8.4    10.50 )-----------( 372      ( 21 Jul 2018 08:54 )             28.4     07-20    141  |  102  |  35<H>  ----------------------------<  146<H>  4.6   |  24  |  0.74    Ca    9.4      20 Jul 2018 07:19      PT/INR - ( 20 Jul 2018 08:19 )   PT: 32.3 sec;   INR: 2.80 ratio             CAPILLARY BLOOD GLUCOSE                RADIOLOGY & ADDITIONAL TESTS:    Imaging Personally Reviewed:  [x ] YES  [ ] NO

## 2018-07-21 NOTE — PROGRESS NOTE ADULT - SUBJECTIVE AND OBJECTIVE BOX
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    Patient was recently admitted from 4/9/18 - 6/6/18. Patient was initially presented for right total knee spacer exchange and I&D with complex wound closure. Previously had a spacer exchanged performed on 3/9 and then was discharged to rehab. Patient then returned on 4/9 with fever and concern for sepsis, found to have positive cultures from knee for MRSA as well as multifocal pneumonia. During admission, patient completed course of Daptomycin for MRSA prosthesis infection and was also treated for multifocal PNA with ?aztreonam. Patient had extensive wound care, followed by Surgery/Plastics/Orthopedics/Id and was discharged to rehab on 6/6 with suppressive minocycline. Admission complicated by lesion over thigh and lower pannus requiring wound vacs.  UA On 6/5 positive, but no culture sent and no antibiotics started.      Patient presents from rehab with fevers and lethargy shortly after discharge. In the ED, Tmax 100.6, HR 85,  /62, O2 98% on 2L NC. Labs showed mild leukocytosis with WBC 13.8, stable anemia Hgb 9.6, and normal platelets 319. Metabolic panel overall unremarkable with Cr 0.89. LFTs unremarkable. VBG with lactate 1.3. UA again positive with >50 WBCs, few bacteria, turbid appearance, and large LE.     Patient had a CT abdomen/pelvis that showed evidence of possible distal appendicitis. Evaluated by Surgery and family declining operative intervention due to high risk. Plastics to aid with wound management, to apply wound vac again in AM to thigh and pannus. Patient not felt to have evidence of wound infection. (07 Jun 2018 22:19)    I saw the pt several times on the last admit. and she had a ferritin that was over 400 and was given at times procrit when the hemoglobin got into the 7 range. The pt has a inflammatory anemia and the hemoglobin now is about 8.6 and she is normocytic hypochromic with a high rdw. Will at this time follow the pt and I see no need now for an extensive workup for this anemia. Will support as needed. 7/12 met with pt and aid at bedside and explained approach to the anemia no need for procrit. 7/16 notes reviewed from the last few days and the hemoglobin was 8.4. 7/18 case discussed with Dr Joseph and steroids may need to be given. the hemoglobin was noted to be 8.9 and epo will be on hold for now.     7/20 was asked by Dr Joseph to comment on the possibility of the pt having coumadin necrosis. It is a very rare occurrence and generally occurs acutely. The cause of coumadin necrosis would occur almost immediately in pts with a low protein s and c level. Both of these are vitamin k dependant factors and with the start of coumadin both of these factors would decrease. The protein s and c are modulators of factor 5 and would on initiation cause local ulcers. Pts like this would also have had clots in the past. The pts is not likely to have this with her history here. Obtaining a protein s and c levels here would not be helpful as these levels will be low on the coumadin. It would take 3 weeks or so off the coumadin for these levels to come up to baseline. Since this a rare case we might consider stopping the warfarin and give the pt a heparin derivative. Antiphospholipid antibody syndrome is not likely in the setting of normal platelets normal ptt in the past and no history of arterial and venous clots, no heart valve clots etc. 7/21 pt is stable and hemoglobin was 8.6 will speak to wound service about above.  Vital Signs Last 24 Hrs  T(C): 36.4 (16 Jul 2018 09:46), Max: 36.7 (15 Jul 2018 14:00)  T(F): 97.5 (16 Jul 2018 09:46), Max: 98.1 (16 Jul 2018 06:17)  HR: 63 (16 Jul 2018 09:46) (63 - 78)  BP: 126/64 (16 Jul 2018 09:46) (115/64 - 154/84)  BP(mean): --  RR: 18 (16 Jul 2018 09:46) (18 - 20)  SpO2: 100% (16 Jul 2018 09:46) (94% - 100%)  MEDICATIONS  (STANDING):  ALPRAZolam 0.25 milliGRAM(s) Oral once  ascorbic acid 500 milliGRAM(s) Oral daily  aspirin enteric coated 81 milliGRAM(s) Oral daily  betamethasone valerate 0.1% Ointment 1 Application(s) Topical two times a day  buDESOnide 160 MICROgram(s)/formoterol 4.5 MICROgram(s) Inhaler 2 Puff(s) Inhalation two times a day  calcium carbonate 1250 mG + Vitamin D (OsCal 500 + D) 1 Tablet(s) Oral three times a day  Dakins Solution - 1/4 Strength 1 Application(s) Topical two times a day  folic acid 1 milliGRAM(s) Oral daily  gabapentin 300 milliGRAM(s) Oral three times a day  loratadine 10 milliGRAM(s) Oral daily  melatonin 3 milliGRAM(s) Oral at bedtime  metoprolol tartrate 12.5 milliGRAM(s) Oral two times a day  minocycline 100 milliGRAM(s) Oral two times a day  multivitamin 1 Tablet(s) Oral daily  pantoprazole    Tablet 40 milliGRAM(s) Oral before breakfast  polyethylene glycol 3350 17 Gram(s) Oral two times a day  simvastatin 20 milliGRAM(s) Oral at bedtime  tiotropium 18 MICROgram(s) Capsule 1 Capsule(s) Inhalation daily    MEDICATIONS  (PRN):  acetaminophen   Tablet. 650 milliGRAM(s) Oral every 6 hours PRN Mild Pain (1 - 3)  bisacodyl Suppository 10 milliGRAM(s) Rectal daily PRN Constipation  bismuth subsalicylate Liquid 30 milliLiter(s) Oral every 6 hours PRN Cramping/abd pain  HYDROmorphone   Tablet 2 milliGRAM(s) Oral every 4 hours PRN Severe Pain (7 - 10)  magnesium hydroxide Suspension 30 milliLiter(s) Oral daily PRN Constipation  senna 2 Tablet(s) Oral at bedtime PRN Constipation  simethicone 80 milliGRAM(s) Chew two times a day PRN Gas    LABS:                            8.4    10.50 )-----------( 372      ( 21 Jul 2018 08:54 )             28.4                Basic Metabolic Panel in AM (07.03.18 @ 07:31)    Sodium, Serum: 140 mmol/L    Potassium, Serum: 4.3 mmol/L    Chloride, Serum: 101 mmol/L    Carbon Dioxide, Serum: 26 mmol/L    Anion Gap, Serum: 13 mmol/L    Blood Urea Nitrogen, Serum: 29 mg/dL    Creatinine, Serum: 0.73 mg/dL    Glucose, Serum: 105 mg/dL    Calcium, Total Serum: 9.4 mg/dL         ROS:  Negative except for:    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  Artificial cardiac pacemaker: 12/25/17  H/O cardiac catheterization: 11/29/16 HERBERT placed on LAD  H/O endoscopy: with dilatation of esophageal stricture 2013  S/P cataract surgery: x2; 3-4yr ago  S/P gastroplasty: 28 yrs ago  S/P cholecystectomy: more than 15 years ago  S/P total knee replacement: left, 15yr ago  S/P total knee replacement: right, 12yr ago  S/P hysterectomy: 24yr ago      SOCIAL HISTORY:    FAMILY HISTORY:  No pertinent family history in first degree relatives      Allergies    amoxicillin (Other)    Intolerances    IV Contrast (Flushing (Skin); Nausea)      PHYSICAL EXAM as per attending and she was in pain being cared for by the staff at the time of my visit.  General: adult in NAD  HEENT: clear oropharynx, anicteric sclera, pink conjunctivae  Neck: supple  CV: normal S1S2 with no murmur rubs or gallops  Lungs: clear to auscultation, no wheezes, no rhales  Abdomen: soft non-tender non-distended, no hepato/splenomegaly  Ext: no clubbing cyanosis or edema  Skin: no rashes and no petichiae  Neuro: alert and oriented X3 no focal deficits    BLOOD SMEAR INTERPRETATION:    RADIOLOGY :

## 2018-07-21 NOTE — PROGRESS NOTE ADULT - ASSESSMENT
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 Pt completed 10 days meropenem.     # abd wound, multiple bl thigh wounds  pt with new R thigh lesion, sp punch biopsy by derm 7/11, positive for urticaria, not consistent with PG  prior biopsies were not consistent with infeection/vasculitis/malignancy    pt continues to develop new ulcers, pathergic response to biopsy sites  6/22 left thigh and left arm punch biopsies consistent with urticaria as well. evaluated by allergy.  tissue culture grew coag neg staph likely colonized  IAs multiple biopsies have not been pos for PG,  7/13 sp intralesional kenalog lesion on right thigh, which did not show improvement after 3 days  continue vac to abdomen, rt Knee, rt lat thigh  appreciate wound care recs, daily dressing changes, wound vac changes every other day  Trial of steroids started D#4 - to monitor   d/w ID, plastics, wound care team      # Pain control  we have not been able to secure pain management consult, nursing manager is aware  Mr Rdz and Son Thony ask to try to decrease dilaudid  pt still with pain that prohibits dressing changes, so will cont dilaudid 2mg q4 PRN for severe pain  try IV tylenol 1g qd PRN for moderate pain  try to avoid IV dilaudid, but okay to give 0.5mg IV before wound changes if necessary  yesterday tramadol 25mg given for pain, can cont this in efforts to try to avoid dilaudid  gabapentin 300mg tid  Dgtr requested xanax 0.25 prior to dressing change  bowel regimen    # polymyalgia rheumatica  no signs of active rheum dz, evaluated by rheum 6/29    # TKR wound-mrsa  chronic minocycline suppression      # chr Anemia  stable, monitor h/h closely  appreciate heme recs    # S/P TAVR (transcatheter aortic valve replacement)  daily coumadin dosing, daily INR    # adjustment disorder with depressed mood  xanax prn        plan of care dw patient and aid at bedside 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 Pt completed 10 days meropenem.     # abd wound, multiple bl thigh wounds  pt with new R thigh lesion, sp punch biopsy by derm 7/11, positive for urticaria, not consistent with PG  prior biopsies were not consistent with infeection/vasculitis/malignancy    pt continues to develop new ulcers, pathergic response to biopsy sites  6/22 left thigh and left arm punch biopsies consistent with urticaria as well. evaluated by allergy.  tissue culture grew coag neg staph likely colonized  IAs multiple biopsies have not been pos for PG,  7/13 sp intralesional kenalog lesion on right thigh, which did not show improvement after 3 days  continue vac to abdomen, rt Knee, rt lat thigh  appreciate wound care recs, daily dressing changes, wound vac changes every other day  Trial of steroids started D#4 - to monitor   d/w ID, plastics, wound care team  f/u ID recs  ID rpt Cultures sent for afb and fungus.      # Pain control  we have not been able to secure pain management consult, nursing manager is aware  Mr Rdz and Son Thony ask to try to decrease dilaudid  pt still with pain that prohibits dressing changes, so will cont dilaudid 2mg q4 PRN for severe pain  try IV tylenol 1g qd PRN for moderate pain  try to avoid IV dilaudid, but okay to give 0.5mg IV before wound changes if necessary  yesterday tramadol 25mg given for pain, can cont this in efforts to try to avoid dilaudid  gabapentin 300mg tid  Dgtr requested xanax 0.25 prior to dressing change  bowel regimen    # polymyalgia rheumatica  no signs of active rheum dz, evaluated by rheum 6/29    # TKR wound-mrsa  chronic minocycline suppression      # chr Anemia  stable, monitor h/h closely  appreciate heme recs    # S/P TAVR (transcatheter aortic valve replacement)  daily coumadin dosing, daily INR    # adjustment disorder with depressed mood  xanax prn        plan of care dw patient and aid at bedside

## 2018-07-22 LAB
INR BLD: 2.49 RATIO — HIGH (ref 0.88–1.16)
PROTHROM AB SERPL-ACNC: 27.4 SEC — HIGH (ref 9.8–12.7)

## 2018-07-22 RX ORDER — WARFARIN SODIUM 2.5 MG/1
1 TABLET ORAL ONCE
Qty: 0 | Refills: 0 | Status: COMPLETED | OUTPATIENT
Start: 2018-07-22 | End: 2018-07-22

## 2018-07-22 RX ADMIN — Medication 1 APPLICATION(S): at 22:25

## 2018-07-22 RX ADMIN — Medication 2 TABLET(S): at 14:41

## 2018-07-22 RX ADMIN — HYDROMORPHONE HYDROCHLORIDE 0.5 MILLIGRAM(S): 2 INJECTION INTRAMUSCULAR; INTRAVENOUS; SUBCUTANEOUS at 14:24

## 2018-07-22 RX ADMIN — BUDESONIDE AND FORMOTEROL FUMARATE DIHYDRATE 2 PUFF(S): 160; 4.5 AEROSOL RESPIRATORY (INHALATION) at 17:55

## 2018-07-22 RX ADMIN — Medication 1 APPLICATION(S): at 18:00

## 2018-07-22 RX ADMIN — Medication 50 MILLIGRAM(S): at 12:41

## 2018-07-22 RX ADMIN — GABAPENTIN 300 MILLIGRAM(S): 400 CAPSULE ORAL at 22:26

## 2018-07-22 RX ADMIN — Medication 3 MILLIGRAM(S): at 22:25

## 2018-07-22 RX ADMIN — Medication 1 TABLET(S): at 12:42

## 2018-07-22 RX ADMIN — Medication 2 TABLET(S): at 06:26

## 2018-07-22 RX ADMIN — Medication 2 TABLET(S): at 22:25

## 2018-07-22 RX ADMIN — Medication 12.5 MILLIGRAM(S): at 06:26

## 2018-07-22 RX ADMIN — Medication 500 MILLIGRAM(S): at 12:42

## 2018-07-22 RX ADMIN — BUDESONIDE AND FORMOTEROL FUMARATE DIHYDRATE 2 PUFF(S): 160; 4.5 AEROSOL RESPIRATORY (INHALATION) at 06:27

## 2018-07-22 RX ADMIN — LORATADINE 10 MILLIGRAM(S): 10 TABLET ORAL at 12:41

## 2018-07-22 RX ADMIN — SIMVASTATIN 20 MILLIGRAM(S): 20 TABLET, FILM COATED ORAL at 22:25

## 2018-07-22 RX ADMIN — GABAPENTIN 300 MILLIGRAM(S): 400 CAPSULE ORAL at 14:41

## 2018-07-22 RX ADMIN — PANTOPRAZOLE SODIUM 40 MILLIGRAM(S): 20 TABLET, DELAYED RELEASE ORAL at 06:26

## 2018-07-22 RX ADMIN — MINOCYCLINE HYDROCHLORIDE 100 MILLIGRAM(S): 45 TABLET, EXTENDED RELEASE ORAL at 06:27

## 2018-07-22 RX ADMIN — Medication 1 APPLICATION(S): at 06:27

## 2018-07-22 RX ADMIN — Medication 81 MILLIGRAM(S): at 12:41

## 2018-07-22 RX ADMIN — TIOTROPIUM BROMIDE 1 CAPSULE(S): 18 CAPSULE ORAL; RESPIRATORY (INHALATION) at 12:41

## 2018-07-22 RX ADMIN — HYDROMORPHONE HYDROCHLORIDE 0.5 MILLIGRAM(S): 2 INJECTION INTRAMUSCULAR; INTRAVENOUS; SUBCUTANEOUS at 23:33

## 2018-07-22 RX ADMIN — HYDROMORPHONE HYDROCHLORIDE 0.5 MILLIGRAM(S): 2 INJECTION INTRAMUSCULAR; INTRAVENOUS; SUBCUTANEOUS at 12:24

## 2018-07-22 RX ADMIN — Medication 0.25 MILLIGRAM(S): at 18:22

## 2018-07-22 RX ADMIN — Medication 1 APPLICATION(S): at 14:27

## 2018-07-22 RX ADMIN — Medication 1 MILLIGRAM(S): at 12:40

## 2018-07-22 RX ADMIN — MINOCYCLINE HYDROCHLORIDE 100 MILLIGRAM(S): 45 TABLET, EXTENDED RELEASE ORAL at 17:58

## 2018-07-22 RX ADMIN — GABAPENTIN 300 MILLIGRAM(S): 400 CAPSULE ORAL at 06:26

## 2018-07-22 RX ADMIN — Medication 12.5 MILLIGRAM(S): at 17:58

## 2018-07-22 NOTE — PROGRESS NOTE ADULT - ASSESSMENT
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 Pt completed 10 days meropenem.     # Abd wound, multiple bl thigh wounds  pt with new R thigh lesion, sp punch biopsy by derm 7/11, positive for urticaria, not consistent with PG  prior biopsies were not consistent with infection/vasculitis/malignancy   pt continues to develop new ulcers, pathergic response to biopsy sites  6/22 left thigh and left arm punch biopsies consistent with urticaria as well. evaluated by allergy/immunology.  tissue culture grew coag neg staph likely colonized  IAs multiple biopsies have not been positive for PG,  7/13 sp intralesional kenalog lesion on right thigh, which did not show improvement after 3 days  continue vac to abdomen, rt Knee, rt lat thigh  appreciate wound care recs, daily dressing changes, wound vac changes every other day  Trial of steroids started Day 5 today - continue to monitor   ID, plastics, wound care team on the case.   ID rpt Cultures sent for afb and fungus.  Heme/onc: the questions of possible coumadin induced necrosis. Discussed with Dr. Cardoza.   Coumadin necrosis unlikely given coumadin necrosis happen acutely with coumadin use.   okay to continue with coumadin. monitor INR.     # Pain control  we have not been able to secure pain management consult, nursing manager is aware  Mr Rdz and Son Thony ask to try to decrease dilaudid  pt still with pain that prohibits dressing changes, so will cont dilaudid 2mg q4 PRN for severe pain  try IV tylenol 1g qd PRN for moderate pain  Try to avoid IV dilaudid, but okay to give 0.5mg IV before wound changes if necessary  yesterday tramadol 25mg given for pain, can cont this in efforts to try to avoid dilaudid  gabapentin 300mg tid  Dgtr requested Xanax 0.25 prior to dressing change  bowel regimen    # polymyalgia rheumatica  no signs of active rheum dz, evaluated by rheum 6/29    # TKR wound-mrsa  chronic minocycline suppression    # chr Anemia  stable, monitor h/h closely  appreciate heme recs    # S/P TAVR (transcatheter aortic valve replacement)  daily coumadin dosing, daily INR    # adjustment disorder with depressed mood  xanax prn    Plan of care dw patient, the  and the aid at bedside.   D/w heme/onc Dr. Cardoza.   d/w MARSHA Casas.

## 2018-07-22 NOTE — PROGRESS NOTE ADULT - ATTENDING COMMENTS
Covering for Dr. Murphy today. She will be back tomorrow 7/23/18.     - Dr. DENISSE Palumbo ([facility])  - [phone]

## 2018-07-22 NOTE — PROGRESS NOTE ADULT - SUBJECTIVE AND OBJECTIVE BOX
Patient is a 77y old  Female who presents with a chief complaint of fever, lethargy (07 Jun 2018 22:19)      SUBJECTIVE / OVERNIGHT EVENTS:  feels well.   at bedside.  so many questions related to details.   "what's her systolic BP, whats her INR? how are her ulcers?, etc"  pt feels okay. awake alert. comfortable.  denied pain.       Vital Signs Last 24 Hrs  T(C): 36.7 (22 Jul 2018 09:11), Max: 36.8 (22 Jul 2018 00:46)  T(F): 98.1 (22 Jul 2018 09:11), Max: 98.3 (22 Jul 2018 00:46)  HR: 116 (22 Jul 2018 09:11) (60 - 116)  BP: 141/67 (22 Jul 2018 09:11) (141/67 - 146/83)  BP(mean): --  RR: 18 (22 Jul 2018 09:11) (18 - 18)  SpO2: 95% (22 Jul 2018 09:11) (95% - 96%)  I&O's Summary    22 Jul 2018 07:01  -  22 Jul 2018 18:51  --------------------------------------------------------  IN: 380 mL / OUT: 400 mL / NET: -20 mL        PHYSICAL EXAM:  GENERAL: NAD, obese lady, lying in bed comfortably   HEAD:  Atraumatic, Normocephalic  EYES: EOMI, PERRLA, conjunctiva and sclera clear  NECK: Supple, No JVD  CHEST/LUNG: Clear to auscultation bilaterally; No wheeze  HEART: Regular rate and rhythm; No murmurs, rubs, or gallops  ABDOMEN: Soft, Nontender, Nondistended; Bowel sounds present  Neuro: AAO x 3, no focal deficit, 5/5 b/l extremities  EXTREMITIES:  2+ Peripheral Pulses, No clubbing, cyanosis, or edema  SKIN: No rashes or lesions  Wounds:   1)Lower abd wound -healing well  2)rt knee-vac wound improved healing  3)rt medial thigh wound- margins indurated, healing slowly  4)Rt lateral thigh/buttock wound-large deep-necrotic tissue-packing done  5)rt lateral thigh 2 small holes-wound packing done  6)Lt lateral /post thigh: 2-3 small punched out ulcers-wound packing done      LABS:                        8.4    10.50 )-----------( 372      ( 21 Jul 2018 08:54 )             28.4           PT/INR - ( 22 Jul 2018 07:22 )   PT: 27.4 sec;   INR: 2.49 ratio           CAPILLARY BLOOD GLUCOSE                RADIOLOGY & ADDITIONAL TESTS:    Imaging Personally Reviewed:  [x] YES  [ ] NO    Consultant(s) Notes Reviewed:  [x] YES  [ ] NO      MEDICATIONS  (STANDING):  ascorbic acid 500 milliGRAM(s) Oral daily  aspirin enteric coated 81 milliGRAM(s) Oral daily  betamethasone valerate 0.1% Ointment 1 Application(s) Topical two times a day  buDESOnide 160 MICROgram(s)/formoterol 4.5 MICROgram(s) Inhaler 2 Puff(s) Inhalation two times a day  calcium carbonate  625 mG + Vitamin D (OsCal 250 + D) 2 Tablet(s) Oral three times a day  Dakins Solution - 1/4 Strength 1 Application(s) Topical two times a day  folic acid 1 milliGRAM(s) Oral daily  gabapentin 300 milliGRAM(s) Oral three times a day  loratadine 10 milliGRAM(s) Oral daily  melatonin 3 milliGRAM(s) Oral at bedtime  metoprolol tartrate 12.5 milliGRAM(s) Oral two times a day  minocycline 100 milliGRAM(s) Oral two times a day  multivitamin 1 Tablet(s) Oral daily  pantoprazole    Tablet 40 milliGRAM(s) Oral before breakfast  polyethylene glycol 3350 17 Gram(s) Oral two times a day  predniSONE   Tablet 50 milliGRAM(s) Oral daily  simvastatin 20 milliGRAM(s) Oral at bedtime  tiotropium 18 MICROgram(s) Capsule 1 Capsule(s) Inhalation daily    MEDICATIONS  (PRN):  acetaminophen   Tablet. 650 milliGRAM(s) Oral every 6 hours PRN Mild Pain (1 - 3)  ALPRAZolam 0.25 milliGRAM(s) Oral daily PRN prior to dressing change ---anxiety  bisacodyl Suppository 10 milliGRAM(s) Rectal daily PRN Constipation  bismuth subsalicylate Liquid 30 milliLiter(s) Oral every 6 hours PRN Cramping/abd pain  HYDROmorphone  Injectable 0.5 milliGRAM(s) IV Push every 12 hours PRN please give before wound changes  magnesium hydroxide Suspension 30 milliLiter(s) Oral daily PRN Constipation  senna 2 Tablet(s) Oral at bedtime PRN Constipation  simethicone 80 milliGRAM(s) Chew two times a day PRN Gas      Care Discussed with Consultants/Other Providers [x] YES  [ ] NO    HEALTH ISSUES - PROBLEM Dx:  Urticaria: Urticaria  Joint infection: Joint infection  Other problems related to medical facilities and other health care: Other problems related to medical facilities and other health care  S/P TAVR (transcatheter aortic valve replacement): S/P TAVR (transcatheter aortic valve replacement)  Acute cystitis without hematuria: Acute cystitis without hematuria  Wound infection: Wound infection  Acute appendicitis, unspecified acute appendicitis type: Acute appendicitis, unspecified acute appendicitis type  Fever, unspecified fever cause: Fever, unspecified fever cause

## 2018-07-22 NOTE — PROGRESS NOTE ADULT - SUBJECTIVE AND OBJECTIVE BOX
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    Patient was recently admitted from 4/9/18 - 6/6/18. Patient was initially presented for right total knee spacer exchange and I&D with complex wound closure. Previously had a spacer exchanged performed on 3/9 and then was discharged to rehab. Patient then returned on 4/9 with fever and concern for sepsis, found to have positive cultures from knee for MRSA as well as multifocal pneumonia. During admission, patient completed course of Daptomycin for MRSA prosthesis infection and was also treated for multifocal PNA with ?aztreonam. Patient had extensive wound care, followed by Surgery/Plastics/Orthopedics/Id and was discharged to rehab on 6/6 with suppressive minocycline. Admission complicated by lesion over thigh and lower pannus requiring wound vacs.  UA On 6/5 positive, but no culture sent and no antibiotics started.      Patient presents from rehab with fevers and lethargy shortly after discharge. In the ED, Tmax 100.6, HR 85,  /62, O2 98% on 2L NC. Labs showed mild leukocytosis with WBC 13.8, stable anemia Hgb 9.6, and normal platelets 319. Metabolic panel overall unremarkable with Cr 0.89. LFTs unremarkable. VBG with lactate 1.3. UA again positive with >50 WBCs, few bacteria, turbid appearance, and large LE.     Patient had a CT abdomen/pelvis that showed evidence of possible distal appendicitis. Evaluated by Surgery and family declining operative intervention due to high risk. Plastics to aid with wound management, to apply wound vac again in AM to thigh and pannus. Patient not felt to have evidence of wound infection. (07 Jun 2018 22:19)    I saw the pt several times on the last admit. and she had a ferritin that was over 400 and was given at times procrit when the hemoglobin got into the 7 range. The pt has a inflammatory anemia and the hemoglobin now is about 8.6 and she is normocytic hypochromic with a high rdw. Will at this time follow the pt and I see no need now for an extensive workup for this anemia. Will support as needed. 7/12 met with pt and aid at bedside and explained approach to the anemia no need for procrit. 7/16 notes reviewed from the last few days and the hemoglobin was 8.4. 7/18 case discussed with Dr Joseph and steroids may need to be given. the hemoglobin was noted to be 8.9 and epo will be on hold for now.     7/20 was asked by Dr Joseph to comment on the possibility of the pt having coumadin necrosis. It is a very rare occurrence and generally occurs acutely. The cause of coumadin necrosis would occur almost immediately in pts with a low protein s and c level. Both of these are vitamin k dependant factors and with the start of coumadin both of these factors would decrease. The protein s and c are modulators of factor 5 and would on initiation cause local ulcers. Pts like this would also have had clots in the past. The pts is not likely to have this with her history here. Obtaining a protein s and c levels here would not be helpful as these levels will be low on the coumadin. It would take 3 weeks or so off the coumadin for these levels to come up to baseline. Since this a rare case we might consider stopping the warfarin and give the pt a heparin derivative. Antiphospholipid antibody syndrome is not likely in the setting of normal platelets normal ptt in the past and no history of arterial and venous clots, no heart valve clots etc.  Vital Signs Last 24 Hrs  Vital Signs Last 24 Hrs  T(C): 36.7 (22 Jul 2018 09:11), Max: 36.8 (22 Jul 2018 00:46)  T(F): 98.1 (22 Jul 2018 09:11), Max: 98.3 (22 Jul 2018 00:46)  HR: 116 (22 Jul 2018 09:11) (60 - 116)  BP: 141/67 (22 Jul 2018 09:11) (141/67 - 146/83)  BP(mean): --  RR: 18 (22 Jul 2018 09:11) (18 - 18)  SpO2: 95% (22 Jul 2018 09:11) (95% - 98%)MEDICATIONS  (STANDING):  ALPRAZolam 0.25 milliGRAM(s) Oral once  ascorbic acid 500 milliGRAM(s) Oral daily  aspirin enteric coated 81 milliGRAM(s) Oral daily  betamethasone valerate 0.1% Ointment 1 Application(s) Topical two times a day  buDESOnide 160 MICROgram(s)/formoterol 4.5 MICROgram(s) Inhaler 2 Puff(s) Inhalation two times a day  calcium carbonate 1250 mG + Vitamin D (OsCal 500 + D) 1 Tablet(s) Oral three times a day  Dakins Solution - 1/4 Strength 1 Application(s) Topical two times a day  folic acid 1 milliGRAM(s) Oral daily  gabapentin 300 milliGRAM(s) Oral three times a day  loratadine 10 milliGRAM(s) Oral daily  melatonin 3 milliGRAM(s) Oral at bedtime  metoprolol tartrate 12.5 milliGRAM(s) Oral two times a day  minocycline 100 milliGRAM(s) Oral two times a day  multivitamin 1 Tablet(s) Oral daily  pantoprazole    Tablet 40 milliGRAM(s) Oral before breakfast  polyethylene glycol 3350 17 Gram(s) Oral two times a day  simvastatin 20 milliGRAM(s) Oral at bedtime  tiotropium 18 MICROgram(s) Capsule 1 Capsule(s) Inhalation daily    MEDICATIONS  (PRN):  acetaminophen   Tablet. 650 milliGRAM(s) Oral every 6 hours PRN Mild Pain (1 - 3)  bisacodyl Suppository 10 milliGRAM(s) Rectal daily PRN Constipation  bismuth subsalicylate Liquid 30 milliLiter(s) Oral every 6 hours PRN Cramping/abd pain  HYDROmorphone   Tablet 2 milliGRAM(s) Oral every 4 hours PRN Severe Pain (7 - 10)  magnesium hydroxide Suspension 30 milliLiter(s) Oral daily PRN Constipation  senna 2 Tablet(s) Oral at bedtime PRN Constipation  simethicone 80 milliGRAM(s) Chew two times a day PRN Gas    LABS:                          8.4    10.50 )-----------( 372      ( 21 Jul 2018 08:54 )             28.4                 07-11    141  |  99  |  30<H>  ----------------------------<  123<H>  4.3   |  29  |  0.81    Ca    9.6      11 Jul 2018 07:11      PT/INR - ( 11 Jul 2018 08:08 )   PT: 15.5 sec;   INR: 1.36 ratio         PTT - ( 11 Jul 2018 08:08 )  PTT:36.8 sec      ROS:  Negative except for:    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  Artificial cardiac pacemaker: 12/25/17  H/O cardiac catheterization: 11/29/16 HERBERT placed on LAD  H/O endoscopy: with dilatation of esophageal stricture 2013  S/P cataract surgery: x2; 3-4yr ago  S/P gastroplasty: 28 yrs ago  S/P cholecystectomy: more than 15 years ago  S/P total knee replacement: left, 15yr ago  S/P total knee replacement: right, 12yr ago  S/P hysterectomy: 24yr ago      SOCIAL HISTORY:    FAMILY HISTORY:  No pertinent family history in first degree relatives      Allergies    amoxicillin (Other)    Intolerances    IV Contrast (Flushing (Skin); Nausea)      PHYSICAL EXAM as per attending and she was in pain being cared for by the staff at the time of my visit.  General: adult in NAD  HEENT: clear oropharynx, anicteric sclera, pink conjunctivae  Neck: supple  CV: normal S1S2 with no murmur rubs or gallops  Lungs: clear to auscultation, no wheezes, no rhales  Abdomen: soft non-tender non-distended, no hepato/splenomegaly  Ext: no clubbing cyanosis or edema  Skin: no rashes and no petichiae  Neuro: alert and oriented X3 no focal deficits    BLOOD SMEAR INTERPRETATION:    RADIOLOGY :

## 2018-07-23 LAB
ANION GAP SERPL CALC-SCNC: 8 MMOL/L — SIGNIFICANT CHANGE UP (ref 5–17)
BUN SERPL-MCNC: 39 MG/DL — HIGH (ref 7–23)
CALCIUM SERPL-MCNC: 9 MG/DL — SIGNIFICANT CHANGE UP (ref 8.4–10.5)
CHLORIDE SERPL-SCNC: 106 MMOL/L — SIGNIFICANT CHANGE UP (ref 96–108)
CO2 SERPL-SCNC: 25 MMOL/L — SIGNIFICANT CHANGE UP (ref 22–31)
CREAT SERPL-MCNC: 0.67 MG/DL — SIGNIFICANT CHANGE UP (ref 0.5–1.3)
FERRITIN SERPL-MCNC: 57 NG/ML — SIGNIFICANT CHANGE UP (ref 15–150)
GLUCOSE SERPL-MCNC: 137 MG/DL — HIGH (ref 70–99)
HAPTOGLOB SERPL-MCNC: 424 MG/DL — HIGH (ref 34–200)
HCT VFR BLD CALC: 30.3 % — LOW (ref 34.5–45)
HGB BLD-MCNC: 8.9 G/DL — LOW (ref 11.5–15.5)
INR BLD: 2.19 RATIO — HIGH (ref 0.88–1.16)
IRON SATN MFR SERPL: 12 % — LOW (ref 14–50)
IRON SATN MFR SERPL: 29 UG/DL — LOW (ref 30–160)
MCHC RBC-ENTMCNC: 25.4 PG — LOW (ref 27–34)
MCHC RBC-ENTMCNC: 29.4 GM/DL — LOW (ref 32–36)
MCV RBC AUTO: 86.3 FL — SIGNIFICANT CHANGE UP (ref 80–100)
PLATELET # BLD AUTO: 385 K/UL — SIGNIFICANT CHANGE UP (ref 150–400)
POTASSIUM SERPL-MCNC: 4.9 MMOL/L — SIGNIFICANT CHANGE UP (ref 3.5–5.3)
POTASSIUM SERPL-SCNC: 4.9 MMOL/L — SIGNIFICANT CHANGE UP (ref 3.5–5.3)
PROTHROM AB SERPL-ACNC: 25.1 SEC — HIGH (ref 10–13.1)
RBC # BLD: 3.51 M/UL — LOW (ref 3.8–5.2)
RBC # FLD: 16.9 % — HIGH (ref 10.3–14.5)
SODIUM SERPL-SCNC: 139 MMOL/L — SIGNIFICANT CHANGE UP (ref 135–145)
TIBC SERPL-MCNC: 245 UG/DL — SIGNIFICANT CHANGE UP (ref 220–430)
UIBC SERPL-MCNC: 216 UG/DL — SIGNIFICANT CHANGE UP (ref 110–370)
VIT B12 SERPL-MCNC: 781 PG/ML — SIGNIFICANT CHANGE UP (ref 232–1245)
WBC # BLD: 12.14 K/UL — HIGH (ref 3.8–10.5)
WBC # FLD AUTO: 12.14 K/UL — HIGH (ref 3.8–10.5)

## 2018-07-23 PROCEDURE — 11043 DBRDMT MUSC&/FSCA 1ST 20/<: CPT

## 2018-07-23 PROCEDURE — 97606 NEG PRS WND THER DME>50 SQCM: CPT

## 2018-07-23 PROCEDURE — 99232 SBSQ HOSP IP/OBS MODERATE 35: CPT | Mod: GC

## 2018-07-23 PROCEDURE — 99232 SBSQ HOSP IP/OBS MODERATE 35: CPT

## 2018-07-23 PROCEDURE — 99232 SBSQ HOSP IP/OBS MODERATE 35: CPT | Mod: 25

## 2018-07-23 RX ORDER — WARFARIN SODIUM 2.5 MG/1
2 TABLET ORAL ONCE
Qty: 0 | Refills: 0 | Status: COMPLETED | OUTPATIENT
Start: 2018-07-23 | End: 2018-07-23

## 2018-07-23 RX ORDER — HYDROMORPHONE HYDROCHLORIDE 2 MG/ML
0.5 INJECTION INTRAMUSCULAR; INTRAVENOUS; SUBCUTANEOUS ONCE
Qty: 0 | Refills: 0 | Status: DISCONTINUED | OUTPATIENT
Start: 2018-07-23 | End: 2018-07-23

## 2018-07-23 RX ORDER — ACETAMINOPHEN 500 MG
1000 TABLET ORAL ONCE
Qty: 0 | Refills: 0 | Status: COMPLETED | OUTPATIENT
Start: 2018-07-23 | End: 2018-07-23

## 2018-07-23 RX ADMIN — GABAPENTIN 300 MILLIGRAM(S): 400 CAPSULE ORAL at 06:09

## 2018-07-23 RX ADMIN — Medication 1000 MILLIGRAM(S): at 18:30

## 2018-07-23 RX ADMIN — Medication 12.5 MILLIGRAM(S): at 17:11

## 2018-07-23 RX ADMIN — Medication 500 MILLIGRAM(S): at 12:06

## 2018-07-23 RX ADMIN — Medication 1 TABLET(S): at 12:07

## 2018-07-23 RX ADMIN — Medication 2 TABLET(S): at 13:44

## 2018-07-23 RX ADMIN — Medication 1 APPLICATION(S): at 17:11

## 2018-07-23 RX ADMIN — TIOTROPIUM BROMIDE 1 CAPSULE(S): 18 CAPSULE ORAL; RESPIRATORY (INHALATION) at 12:07

## 2018-07-23 RX ADMIN — Medication 1 MILLIGRAM(S): at 12:07

## 2018-07-23 RX ADMIN — Medication 1 APPLICATION(S): at 23:42

## 2018-07-23 RX ADMIN — Medication 50 MILLIGRAM(S): at 12:08

## 2018-07-23 RX ADMIN — Medication 2 TABLET(S): at 06:10

## 2018-07-23 RX ADMIN — LORATADINE 10 MILLIGRAM(S): 10 TABLET ORAL at 12:07

## 2018-07-23 RX ADMIN — Medication 1 APPLICATION(S): at 06:00

## 2018-07-23 RX ADMIN — Medication 3 MILLIGRAM(S): at 22:15

## 2018-07-23 RX ADMIN — PANTOPRAZOLE SODIUM 40 MILLIGRAM(S): 20 TABLET, DELAYED RELEASE ORAL at 06:10

## 2018-07-23 RX ADMIN — MINOCYCLINE HYDROCHLORIDE 100 MILLIGRAM(S): 45 TABLET, EXTENDED RELEASE ORAL at 06:10

## 2018-07-23 RX ADMIN — Medication 1 APPLICATION(S): at 10:35

## 2018-07-23 RX ADMIN — Medication 650 MILLIGRAM(S): at 14:27

## 2018-07-23 RX ADMIN — Medication 81 MILLIGRAM(S): at 12:07

## 2018-07-23 RX ADMIN — Medication 12.5 MILLIGRAM(S): at 06:10

## 2018-07-23 RX ADMIN — BUDESONIDE AND FORMOTEROL FUMARATE DIHYDRATE 2 PUFF(S): 160; 4.5 AEROSOL RESPIRATORY (INHALATION) at 17:10

## 2018-07-23 RX ADMIN — GABAPENTIN 300 MILLIGRAM(S): 400 CAPSULE ORAL at 22:15

## 2018-07-23 RX ADMIN — HYDROMORPHONE HYDROCHLORIDE 0.5 MILLIGRAM(S): 2 INJECTION INTRAMUSCULAR; INTRAVENOUS; SUBCUTANEOUS at 00:57

## 2018-07-23 RX ADMIN — Medication 400 MILLIGRAM(S): at 18:00

## 2018-07-23 RX ADMIN — MINOCYCLINE HYDROCHLORIDE 100 MILLIGRAM(S): 45 TABLET, EXTENDED RELEASE ORAL at 17:11

## 2018-07-23 RX ADMIN — WARFARIN SODIUM 2 MILLIGRAM(S): 2.5 TABLET ORAL at 22:14

## 2018-07-23 RX ADMIN — GABAPENTIN 300 MILLIGRAM(S): 400 CAPSULE ORAL at 13:44

## 2018-07-23 RX ADMIN — Medication 0.25 MILLIGRAM(S): at 16:54

## 2018-07-23 RX ADMIN — HYDROMORPHONE HYDROCHLORIDE 0.5 MILLIGRAM(S): 2 INJECTION INTRAMUSCULAR; INTRAVENOUS; SUBCUTANEOUS at 23:00

## 2018-07-23 RX ADMIN — POLYETHYLENE GLYCOL 3350 17 GRAM(S): 17 POWDER, FOR SOLUTION ORAL at 17:10

## 2018-07-23 RX ADMIN — SIMVASTATIN 20 MILLIGRAM(S): 20 TABLET, FILM COATED ORAL at 22:15

## 2018-07-23 RX ADMIN — BUDESONIDE AND FORMOTEROL FUMARATE DIHYDRATE 2 PUFF(S): 160; 4.5 AEROSOL RESPIRATORY (INHALATION) at 06:09

## 2018-07-23 RX ADMIN — Medication 650 MILLIGRAM(S): at 14:57

## 2018-07-23 NOTE — PROGRESS NOTE ADULT - ASSESSMENT
76 yo F with a PMH of CAD s/p HERBERT, AS s/p TAVR, bradycardia s/p PM, MVR, DVT/PE, and s/p TKR who has recurrent, deep skin ulcers of unknown etiology despite multiple skin biopsies and tissue cultures.    1. Skin ulcers on the lower abdomen and lower extremities. Differential diagnosis includes pyoderma gangrenosum vs. panniculitis vs. urticaria with excoriation vs. atypical mycobacteria  - s/p numerous skin biopsies. The last three biopsies have shown findings consistent with urticaria.  - No sign of cutaneous infection on prior biopsies.   - One lesion on the R lateral thigh was with treated with ILK 40 without improvement  - Given findings of urticaria and stable/improving wounds on the wound vac, would recommend trimming of patients nails as there is chance she is using them to dig into her skin. Would also place wound vacs/special dressings on the new lesions and insure only medical personnel are manipulating the dressings and wound vac.  - Patient was cleared by infectious disease for prednisone use. Pyoderma gangrenosum remains on the differential diagnosis. Continue empiric Prednisone 50 mg QD. Response or lack of response will be very informative in establishing a diagnosis of PG. Given tentative improvement, may consider eventual switch to a steroid sparing agent, such as Cyclosporine or infliximab  - Patient was seen and discussed with Dr. Cruz. Will follow along with you. 76 yo F with a PMH of CAD s/p HERBERT, AS s/p TAVR, bradycardia s/p PM, MVR, DVT/PE, and s/p TKR who has recurrent, deep skin ulcers of unknown etiology despite multiple skin biopsies and tissue cultures.    1. Skin ulcers on the lower abdomen and lower extremities. Differential diagnosis includes pyoderma gangrenosum vs. panniculitis. Unlikely to be urticaria with excoriation vs. atypical mycobacteria  - s/p numerous skin biopsies. The last three biopsies have shown findings consistent with urticaria.  - No sign of cutaneous infection on prior biopsies.  - Patient was cleared by infectious disease for prednisone use. Pyoderma gangrenosum remains on the differential diagnosis. Continue empiric Prednisone 50 mg QD. Response or lack of response will be very informative in establishing a diagnosis of PG. Given tentative improvement, may consider eventual switch to a steroid sparing agent, such as Cyclosporine or infliximab  - Patient to be discussed at dermatology grand rounds on 7/25  - Patient was seen and discussed with Dr. Cruz. Will follow along with you. 78 yo F with a PMH of CAD s/p HERBERT, AS s/p TAVR, bradycardia s/p PM, MVR, DVT/PE, and s/p TKR who has recurrent, deep skin ulcers of unknown etiology despite multiple skin biopsies and tissue cultures.    1. Skin ulcers on the lower abdomen and lower extremities. Differential diagnosis includes pyoderma gangrenosum vs. panniculitis. Unlikely to be urticaria with excoriation vs. atypical mycobacteria  - s/p numerous skin biopsies. The last three biopsies have shown findings consistent with urticaria.  - No sign of cutaneous infection on prior biopsies.  - Patient was cleared by infectious disease for prednisone use. Pyoderma gangrenosum remains on the differential diagnosis. Continue empiric Prednisone 50 mg QD. Given tentative improvement, may consider eventual switch to a steroid sparing agent, such as Cyclosporine or infliximab  - Start Clobetasol .05% ointment to wound edges BID, under occlusion of bandage. Use for two weeks.  - Patient to be discussed at dermatology grand rounds on 7/25  - Patient was seen and discussed with Dr. Cruz. Will follow along with you.

## 2018-07-23 NOTE — PROGRESS NOTE ADULT - SUBJECTIVE AND OBJECTIVE BOX
SUBJECTIVE: Pt seen, chart reviewed.  Pt's daughter & HHA at bedside.  Pt was premedicated.   Improving pain-- increased neurontin helpful  Pt seen w/Plastics   Pt overall improving otherwise-   Derm ok'ed restarting steroids 2/2 last path bx results, suggestive but not definitive for PG but still no definitive dx for ulcers.    f/u w/ rheum/ derm/ maybe hem/onc  spontaneous necrotic hemorraghic wounds w/ fat necrosis    No odor, redness, warmth, f/c/s noted  drainage managed by dressings	    ROS skin / msk see HPI   all other systems negative      aspirin enteric coated 81 milliGRAM(s) Oral daily  minocycline 100 milliGRAM(s) Oral two times a day  warfarin 2 milliGRAM(s) Oral once      Physical Exam:  Vital Signs Last 24 Hrs  T(C): 36.7 (23 Jul 2018 12:47), Max: 36.8 (22 Jul 2018 22:58)  T(F): 98 (23 Jul 2018 12:47), Max: 98.3 (22 Jul 2018 22:58)  HR: 61 (23 Jul 2018 12:47) (60 - 61)  BP: 133/73 (23 Jul 2018 12:47) (132/78 - 133/73)  BP(mean): --  RR: 18 (23 Jul 2018 12:47) (18 - 18)  SpO2: 96% (23 Jul 2018 12:47) (95% - 96%)    General Appearance:    NAD /  A&Ox3  MO/ frail/ Disheveled   Envella    Musculoskeletal/Vascular:  BLE edema  BLE equally warm no acute ischemia noted  no deformities/ contractures  Rt knee wound per plastics    Skin:  dry, frail,  ecchymosis w/o hematoma    pt is tender when just gently touching skin- therefore pt was premedicated prior to assessment    Multiple wounds- see measurements in A&I sections- placed by wound PT    RLQ abdomen wound w/ moist & granular tissue no odor, kimber     Rt anterior thigh wound w/ moist & granular tissue    Rt superior posterolateral w/ new upper thigh 2 small wounds opened w/ serosanguinous drainage w/ nonviable tissue    Rt inferior posterolateral wound   moist & granular tissue w/ necrotic & nonviable tissue medially  full thickness in area of recently noted new firmness    (+) serosanguinous drainage & tender w/o odor  No erythema, increased warmth, induration, fluctuance    Lt upper lateral thigh wound   full thickness ulcers w/ moist & granular tissue   (+)serosanguinous  drainage  (+)tender w/o malodor  No erythema, increased warmth, induration, fluctuance    Lt lateral posterior thigh (inferior) superficial w/ 2 small partial thickness  fibrinous exudate  in 2cm x 1cm x 0.1cm  (+)serosanguinous drainage & tender  No erythema,  odor, increased warmth, induration, fluctuance    Lt  Upper medial inner thigh   3 wounds/ 2 partial thickness & 1 full thickness   moist &granular w/ fibrinous exudate  (+)serosanguinous drainage  (+)tender w/o odor  No erythema, increased warmth, induration, fluctuance          LABS:                        8.9    12.14 )-----------( 385      ( 23 Jul 2018 07:34 )             30.3     PT/INR - ( 23 Jul 2018 07:34 )   PT: 25.1 sec;   INR: 2.19 ratio             RADIOLOGY & ADDITIONAL STUDIES:  Surgical Pathology Report:   ACCESSION No:  10 [id number]    Final Diagnosis  Right thigh, punch biopsy  - Sparse perivascular lymphocytic infiltrate with few  scattered neutrophils, eosinophils, and mast cells (see note).    Note: Multiple levels are examined. Slight dermal fibrosis is  noted. No fungal organisms are seen with a PAS/D stain;  correlation with cultures is recommended. The findings are not  specific; however, the histopathologic  differential diagnosis includes urticaria. Clinical correlation  is required.    Verified by: Romain José M.D., Dermatopathologist  (Electronic Signature)  Reported on: 07/16/18 12:50 EDT,[address], [address], [place] [address]  _________________________________________________________________    Clinical History  77-year-old female history of CAD, AS, MVR p/w ulcers on LE.  Healing with wound vac. Previous biopsy x5.  Rule out PG vs. infection vs. factitious    Specimen(s) Submitted  1     Punch skin right thigh    Gross Description  The specimen is received in formalin and the specimen container  is labeled: Left thigh. It consists of a 0.4 x 0.3 cm diameter  punch biopsy of skin taken to a depth of 0.5 cm. The epidermis is  tan-white and soft. The specimen is entirely submitted in one  cassette.    In addition to other data that may appear on the specimen  container, the label has been inspected to confirm the presence  of the patient's name and date of birth.    ALINE 07/11/18 19:37 (07.11.18 @ 16:30) SUBJECTIVE: Pt seen, chart reviewed.  Pt's daughter & HHA at bedside.  Pt was premedicated.   Improving pain-- increased neurontin helpful  Pt seen w/Plastics   Pt overall improving otherwise-   Derm ok'ed restarting steroids 2/2 last path bx results, suggestive but not definitive for PG but still no definitive dx for ulcers.    f/u w/ rheum/ derm/ maybe hem/onc  spontaneous necrotic hemorraghic wounds w/ fat necrosis    No odor, redness, warmth, f/c/s noted  drainage managed by dressings	    ROS skin / msk see HPI   all other systems negative      aspirin enteric coated 81 milliGRAM(s) Oral daily  minocycline 100 milliGRAM(s) Oral two times a day  warfarin 2 milliGRAM(s) Oral once      Physical Exam:  Vital Signs Last 24 Hrs  T(C): 36.7 (23 Jul 2018 12:47), Max: 36.8 (22 Jul 2018 22:58)  T(F): 98 (23 Jul 2018 12:47), Max: 98.3 (22 Jul 2018 22:58)  HR: 61 (23 Jul 2018 12:47) (60 - 61)  BP: 133/73 (23 Jul 2018 12:47) (132/78 - 133/73)  BP(mean): --  RR: 18 (23 Jul 2018 12:47) (18 - 18)  SpO2: 96% (23 Jul 2018 12:47) (95% - 96%)    General Appearance:    NAD /  A&Ox3  MO/ frail/ Disheveled   Envella    Musculoskeletal/Vascular:  BLE edema  BLE equally warm no acute ischemia noted  no deformities/ contractures  Rt knee wound per plastics    Skin:  dry, frail,  ecchymosis w/o hematoma    pt is tender when just gently touching skin- therefore pt was premedicated prior to assessment    Multiple wounds- see measurements in A&I sections- placed by wound PT    RLQ abdomen wound w/ moist & granular tissue no odor, kimber     Rt anterior thigh wound w/ moist & granular tissue - kimber    Rt superior posterolateral w/ new upper thigh 2 small wounds opened w/ serosanguinous drainage w/ increased granular tissue w/ less fibrinous exudate & slough    Rt inferior posterolateral wound   moist & granular tissue w/ necrotic tissue medially  -full thickness in area of recently noted new firmness    (+) serosanguinous drainage & tender w/o odor  No erythema, increased warmth, induration, fluctuance    Lt upper lateral thigh wound   full thickness ulcers w/ moist & granular tissue   (+)serosanguinous  drainage  (+)tender w/o malodor  No erythema, increased warmth, induration, fluctuance    Lt lateral posterior thigh (inferior) superficial w/ 2 small partial thickness  fibrinous exudate  in 2cm x 1cm x 0.1cm  (+)serosanguinous drainage & tender  No erythema,  odor, increased warmth, induration, fluctuance    Lt  Upper medial inner thigh   3 wounds/ 2 partial thickness & 1 full thickness   moist &granular w/ fibrinous exudate  (+)serosanguinous drainage  (+)tender w/o odor  No erythema, increased warmth, induration, fluctuance          LABS:                        8.9    12.14 )-----------( 385      ( 23 Jul 2018 07:34 )             30.3     PT/INR - ( 23 Jul 2018 07:34 )   PT: 25.1 sec;   INR: 2.19 ratio             RADIOLOGY & ADDITIONAL STUDIES:  Surgical Pathology Report:   ACCESSION No:  10 [id number]    Final Diagnosis  Right thigh, punch biopsy  - Sparse perivascular lymphocytic infiltrate with few  scattered neutrophils, eosinophils, and mast cells (see note).    Note: Multiple levels are examined. Slight dermal fibrosis is  noted. No fungal organisms are seen with a PAS/D stain;  correlation with cultures is recommended. The findings are not  specific; however, the histopathologic  differential diagnosis includes urticaria. Clinical correlation  is required.    Verified by: Romain José M.D., Dermatopathologist  (Electronic Signature)  Reported on: 07/16/18 12:50 EDT,[address], [address], [place] [address]  _________________________________________________________________    Clinical History  77-year-old female history of CAD, AS, MVR p/w ulcers on LE.  Healing with wound vac. Previous biopsy x5.  Rule out PG vs. infection vs. factitious    Specimen(s) Submitted  1     Punch skin right thigh    Gross Description  The specimen is received in formalin and the specimen container  is labeled: Left thigh. It consists of a 0.4 x 0.3 cm diameter  punch biopsy of skin taken to a depth of 0.5 cm. The epidermis is  tan-white and soft. The specimen is entirely submitted in one  cassette.    In addition to other data that may appear on the specimen  container, the label has been inspected to confirm the presence  of the patient's name and date of birth.    ALINE 07/11/18 19:37 (07.11.18 @ 16:30)

## 2018-07-23 NOTE — PROGRESS NOTE ADULT - SUBJECTIVE AND OBJECTIVE BOX
Patient is a 77y old  Female who presents with a chief complaint of fever, lethargy (07 Jun 2018 22:19)      INTERVAL HPI/OVERNIGHT EVENTS: noted, feels well      Vital Signs Last 24 Hrs  T(C): 36.8 (22 Jul 2018 22:58), Max: 36.8 (22 Jul 2018 22:58)  T(F): 98.3 (22 Jul 2018 22:58), Max: 98.3 (22 Jul 2018 22:58)  HR: 60 (22 Jul 2018 22:58) (60 - 60)  BP: 132/78 (22 Jul 2018 22:58) (132/78 - 132/78)  BP(mean): --  RR: 18 (22 Jul 2018 22:58) (18 - 18)  SpO2: 95% (22 Jul 2018 22:58) (95% - 95%)    acetaminophen   Tablet. 650 milliGRAM(s) Oral every 6 hours PRN  ALPRAZolam 0.25 milliGRAM(s) Oral daily PRN  ascorbic acid 500 milliGRAM(s) Oral daily  aspirin enteric coated 81 milliGRAM(s) Oral daily  betamethasone valerate 0.1% Ointment 1 Application(s) Topical two times a day  bisacodyl Suppository 10 milliGRAM(s) Rectal daily PRN  bismuth subsalicylate Liquid 30 milliLiter(s) Oral every 6 hours PRN  buDESOnide 160 MICROgram(s)/formoterol 4.5 MICROgram(s) Inhaler 2 Puff(s) Inhalation two times a day  calcium carbonate  625 mG + Vitamin D (OsCal 250 + D) 2 Tablet(s) Oral three times a day  Dakins Solution - 1/4 Strength 1 Application(s) Topical two times a day  folic acid 1 milliGRAM(s) Oral daily  gabapentin 300 milliGRAM(s) Oral three times a day  HYDROmorphone  Injectable 0.5 milliGRAM(s) IV Push every 12 hours PRN  loratadine 10 milliGRAM(s) Oral daily  magnesium hydroxide Suspension 30 milliLiter(s) Oral daily PRN  melatonin 3 milliGRAM(s) Oral at bedtime  metoprolol tartrate 12.5 milliGRAM(s) Oral two times a day  minocycline 100 milliGRAM(s) Oral two times a day  multivitamin 1 Tablet(s) Oral daily  pantoprazole    Tablet 40 milliGRAM(s) Oral before breakfast  polyethylene glycol 3350 17 Gram(s) Oral two times a day  predniSONE   Tablet 50 milliGRAM(s) Oral daily  senna 2 Tablet(s) Oral at bedtime PRN  simethicone 80 milliGRAM(s) Chew two times a day PRN  simvastatin 20 milliGRAM(s) Oral at bedtime  tiotropium 18 MICROgram(s) Capsule 1 Capsule(s) Inhalation daily      PHYSICAL EXAM:  GENERAL: NAD,   EYES: conjunctiva and sclera clear  ENMT: Moist mucous membranes  NECK: Supple, No JVD, Normal thyroid  NERVOUS SYSTEM:  Alert & Oriented X3,   CHEST/LUNG: Clear to auscultation bilaterally; No rales, rhonchi, wheezing, or rubs  HEART: Regular rate and rhythm; No murmurs, rubs, or gallops  ABDOMEN: Soft, Nontender, Nondistended; Bowel sounds present  EXTREMITIES:    wounds  1)Lower abd wound -healing well  2)rt knee-vac wound improved healing  3)rt medial thigh wound- margins indurated, healing slowly  4)Rt lateral thigh/buttock wound-large deep-necrotic tissue-packing done  5)rt lateral thigh 2 small holes-wound packing done  6) Lt medial thigh '2 small deep ulcers-wound packing  7)Lt lateral /post thigh: 2-3 small punched out ulcers-wound packing done  LYMPH: No lymphadenopathy noted  SKIN: No rashes or lesions    Consultant(s) Notes Reviewed:  [x ] YES  [ ] NO  Care Discussed with Consultants/Other Providers [ x] YES  [ ] NO    LABS:                        8.9    12.14 )-----------( 385      ( 23 Jul 2018 07:34 )             30.3     07-23    139  |  106  |  39<H>  ----------------------------<  137<H>  4.9   |  25  |  0.67    Ca    9.0      23 Jul 2018 06:54      PT/INR - ( 23 Jul 2018 07:34 )   PT: 25.1 sec;   INR: 2.19 ratio             CAPILLARY BLOOD GLUCOSE                RADIOLOGY & ADDITIONAL TESTS:    Imaging Personally Reviewed:  [x ] YES  [ ] NO

## 2018-07-23 NOTE — PROGRESS NOTE ADULT - SUBJECTIVE AND OBJECTIVE BOX
INTERVAL HPI/OVERNIGHT EVENTS:  No acute events overnight. No new lesions. Lesions remain painful. Patient's aide, Lissa, reports significant drying of lesions. Lesion on R thigh is scabbing up.    HPI:    76 yo F with a PMH of CAD s/p HERBERT, AS s/p TAVR, bradycardia s/p PM, MVR, DVT/PE, and s/p TKR who presented on 6/7 with appendicitis.  Dermatology has been following her for ulcers since 5/23/18 during her previous admission. We are re-consulted for a new lesion on the R thigh. Painful. Noticed on Monday. Growing. Worried that this too will ulcerate. Otherwise well.    When Pt was initially consulted in late May for R knee ulcer, the ulcer was already debrided and it was difficult to understand how the ulcer initially arose. At the time of the initial, pt already had wound VAC placed in lower abdomen and R anterior thigh. Over the course of the month, pt developed new ulcers and lesions.    Biopsies were obtained from ulcers on R medial knee on May 23 (debrided prior to biopsy) and L inner thigh on Jun 13 (new ulcer) along with tissue cultures. The biopsies showed mixed inflammatory patterns. Bacterial tissue cx's were reviewed by ID, and they were thought to be results of colonization. Mycobacteria and fungal cx were negative. Another tissue cx obtained from subcutaneous tissue from R lateral thigh on Jun 18 to rule out atypical mycobacteria has been negative.    Another biopsy from L wrist and L thigh (pt's daughter reported the thigh lesion appeared to be how all the ulcers started) on Jun 22 revealed urticaria and excoriation.      Seen with her aid, Lissa.     MEDICATIONS  (STANDING):  ALPRAZolam 0.25 milliGRAM(s) Oral once  ascorbic acid 500 milliGRAM(s) Oral daily  aspirin enteric coated 81 milliGRAM(s) Oral daily  betamethasone valerate 0.1% Ointment 1 Application(s) Topical two times a day  buDESOnide 160 MICROgram(s)/formoterol 4.5 MICROgram(s) Inhaler 2 Puff(s) Inhalation two times a day  calcium carbonate 1250 mG + Vitamin D (OsCal 500 + D) 1 Tablet(s) Oral three times a day  Dakins Solution - 1/4 Strength 1 Application(s) Topical two times a day  folic acid 1 milliGRAM(s) Oral daily  gabapentin 300 milliGRAM(s) Oral three times a day  loratadine 10 milliGRAM(s) Oral daily  melatonin 3 milliGRAM(s) Oral at bedtime  metoprolol tartrate 12.5 milliGRAM(s) Oral two times a day  minocycline 100 milliGRAM(s) Oral two times a day  multivitamin 1 Tablet(s) Oral daily  pantoprazole    Tablet 40 milliGRAM(s) Oral before breakfast  polyethylene glycol 3350 17 Gram(s) Oral two times a day  simvastatin 20 milliGRAM(s) Oral at bedtime  tiotropium 18 MICROgram(s) Capsule 1 Capsule(s) Inhalation daily  warfarin 1 milliGRAM(s) Oral once    MEDICATIONS  (PRN):  acetaminophen   Tablet. 650 milliGRAM(s) Oral every 6 hours PRN Mild Pain (1 - 3)  bisacodyl Suppository 10 milliGRAM(s) Rectal daily PRN Constipation  bismuth subsalicylate Liquid 30 milliLiter(s) Oral every 6 hours PRN Cramping/abd pain  HYDROmorphone   Tablet 4 milliGRAM(s) Oral every 4 hours PRN Severe Pain (7 - 10)  HYDROmorphone   Tablet 2 milliGRAM(s) Oral every 3 hours PRN Moderate Pain (4 - 6)  magnesium hydroxide Suspension 30 milliLiter(s) Oral daily PRN Constipation  senna 2 Tablet(s) Oral at bedtime PRN Constipation  simethicone 80 milliGRAM(s) Chew two times a day PRN Gas      Allergies    amoxicillin (Other)    Intolerances    IV Contrast (Flushing (Skin); Nausea)      REVIEW OF SYSTEMS      General: no fevers/chills, no NS	    Skin: see HPI  	  Ophthalmologic: no eye pain or change in vision    Genitourinary: no dysuria or hematuria    Musculoskeletal: no joint pains or weakness	    Neurological:no weakness or tingling          Vital Signs Last 24 Hrs  T(C): 36.4 (13 Jul 2018 09:02), Max: 37.1 (12 Jul 2018 17:38)  T(F): 97.6 (13 Jul 2018 09:02), Max: 98.7 (12 Jul 2018 17:38)  HR: 75 (13 Jul 2018 09:02) (75 - 86)  BP: 109/71 (13 Jul 2018 09:02) (109/71 - 115/78)  BP(mean): --  RR: 18 (13 Jul 2018 09:02) (18 - 18)  SpO2: 92% (13 Jul 2018 09:02) (92% - 98%)    PHYSICAL EXAM:   The patient was alert and oriented X 3, well nourished, and in no  apparent distress.  There was no visible lymphadenopathy.  Conjunctiva were non injected  There was no clubbing or edema of extremities.  There was no hyperhidrosis or bromhidrosis.    Of note on skin exam:   R lateral thigh with 4 cm linear, deep, well circumscribed ulcer with superimposed eschar and 2 x 2 cm, well circumscribed, full thickness ulcer with fibrinous base and surrounding erythema    L lateral thigh: 2 cm oval ulcer, well-demarcated (improving)    Wound vac on lower abdomen, R thigh. L thigh    LABS:                        8.9    10.36 )-----------( 291      ( 13 Jul 2018 09:30 )             28.7     07-13    139  |  98  |  27<H>  ----------------------------<  124<H>  4.3   |  29  |  0.87    Ca    10.0      13 Jul 2018 07:17      PT/INR - ( 13 Jul 2018 08:02 )   PT: 30.3 sec;   INR: 2.63 ratio         PTT - ( 12 Jul 2018 08:18 )  PTT:39.1 sec

## 2018-07-23 NOTE — PROGRESS NOTE ADULT - ASSESSMENT
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 [place] [employer] Model [id number], MVR, 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 with recent admission from (3/9/2018 to 6/6/2018) for TKR c/b joint infection s/p explantation with multiple wounds from rehab for evaluation of fever, lethargy, and nausea at rehab found to be febrile here with evidence of appendicitis on CT abdomen, non-operative candidate, now resolved      Multiple wounds Abdomen Bilateral thighs -no gross signs of infection    VAC to RLQ abdominal wound and & Rt knee  Rt anterior thigh- Breanne dressing  Rt lateral thigh- Dakins  Multiple smaller wounds- Aquacel Ag dressings- dressing order placed    Rt knee w/ skin dehiscence - per plastics and ortho  F/u Derm/ Rheum/ Hem  Abx per Medicine/ ID  Gen SUrg/ Plastics/ Ortho consults appreciated  con't offloading  con't Nuturition  con't Pericare  Con't per Medicine  F/u as outpatient in Wound [facility] [address] [facility] [phone]  d/w Medicine & Plastics team & d/w attending  Janki Cramer PA-C [id number]  I spent  35 minutes w/ this pt of which more than 50% of the time was spent counseling & coordinating care of this pt.

## 2018-07-23 NOTE — PROGRESS NOTE ADULT - SUBJECTIVE AND OBJECTIVE BOX
pain controlled  per notes wounds appear to be healing with steroid treatment    afvss  nad  abdominal wound - vac  RLE  thigh - dressing per wound care teams, lateral wound wet to dry per prs/wc teams  knee wound with vac  remainder of wounds dressed by wound care  5/5 ta/ehl/gcs  silt l4-s1  2+ dp pulse    ROS: depressed

## 2018-07-23 NOTE — PROGRESS NOTE ADULT - ASSESSMENT
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 Pt completed 10 days meropenem.     # Abd wound, multiple bl thigh wounds  pt with new R thigh lesion, sp punch biopsy by derm 7/11, positive for urticaria, not consistent with PG  prior biopsies were not consistent with infection/vasculitis/malignancy   pt continues to develop new ulcers, pathergic response to biopsy sites  6/22 left thigh and left arm punch biopsies consistent with urticaria as well. evaluated by allergy/immunology.  tissue culture grew coag neg staph likely colonized  IAs multiple biopsies have not been positive for PG,  7/13 sp intralesional kenalog lesion on right thigh, which did not show improvement after 3 days  continue vac to abdomen, rt Knee, rt lat thigh  appreciate wound care recs, daily dressing changes, wound vac changes every other day  Trial of steroids started Day 6 today - continue to monitor   ID, plastics, wound care team on the case.   ID rpt Cultures sent for afb and fungus    Heme/onc: the questions of possible coumadin induced necrosis. Discussed with Dr. Cardoza.   Coumadin necrosis unlikely given coumadin necrosis happen acutely with coumadin use.   okay to continue with coumadin. monitor INR.     # Pain control   cont dilaudid 2mg q4 PRN for severe pain  try IV tylenol 1g qd PRN for moderate pain  Try to avoid IV dilaudid, but okay to give 0.5mg IV before wound changes if necessary  yesterday tramadol 25mg given for pain, can cont this in efforts to try to avoid dilaudid  gabapentin 300mg tid   Xanax 0.25 prior to dressing change  bowel regimen    # polymyalgia rheumatica  no signs of active rheum dz, evaluated by rheum 6/29    # TKR wound-mrsa  chronic minocycline suppression    # chr Anemia  stable, monitor h/h closely  appreciate heme recs    # S/P TAVR (transcatheter aortic valve replacement)  daily coumadin dosing, daily INR    # adjustment disorder with depressed mood  xanax prn    Plan of care dw patient, dgtr at bedside

## 2018-07-23 NOTE — PROGRESS NOTE ADULT - SUBJECTIVE AND OBJECTIVE BOX
CC: f/u for prosthetic knee infection, wounds    Patient reports    REVIEW OF SYSTEMS:  All other review of systems negative (Comprehensive ROS)    Antimicrobials Day #  :  minocycline 100 milliGRAM(s) Oral two times a day    Other Medications Reviewed    T(F): 98 (07-23-18 @ 12:47), Max: 98.3 (07-22-18 @ 22:58)  HR: 61 (07-23-18 @ 12:47)  BP: 133/73 (07-23-18 @ 12:47)  RR: 18 (07-23-18 @ 12:47)  SpO2: 96% (07-23-18 @ 12:47)  Wt(kg): --    PHYSICAL EXAM:  General: alert, no acute distress  Eyes:  anicteric, no conjunctival injection, no discharge  Oropharynx: no lesions or injection 	  Neck: supple, without adenopathy  Lungs: clear to auscultation  Heart: regular rate and rhythm; no murmur, rubs or gallops  Abdomen: soft, nondistended, nontender, without mass or organomegaly  Skin: no lesions  Extremities: no clubbing, cyanosis, . vacs and dressings over various wounds, no surrounding cellultiis  Neurologic: alert, oriented, moves all extremities    LAB RESULTS:                        8.9    12.14 )-----------( 385      ( 23 Jul 2018 07:34 )             30.3     07-23    139  |  106  |  39<H>  ----------------------------<  137<H>  4.9   |  25  |  0.67    Ca    9.0      23 Jul 2018 06:54          MICROBIOLOGY:  RECENT CULTURES:  07-20 @ 17:13 .Other Other, right hip ulcer     Testing in progress          Assessment:  Patient with poor wound healing after right tkr rudy s/p spacer change on chronic minocycline. Had appendicitis managed medically. Gets wounds spontaneously in adipose rich areas with biopsy not specific but neg for afb, fungus and active bacterial infection now on steroids for possible panniculitis, pg, eosinophilic fasciitis  Plan: continue minocycline  continue steroids  continue local wound care

## 2018-07-23 NOTE — PROGRESS NOTE ADULT - ASSESSMENT
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

## 2018-07-24 LAB
HCT VFR BLD CALC: 30.5 % — LOW (ref 34.5–45)
HGB BLD-MCNC: 9.5 G/DL — LOW (ref 11.5–15.5)
INR BLD: 1.84 RATIO — HIGH (ref 0.88–1.16)
MCHC RBC-ENTMCNC: 26.8 PG — LOW (ref 27–34)
MCHC RBC-ENTMCNC: 31.1 GM/DL — LOW (ref 32–36)
MCV RBC AUTO: 85.9 FL — SIGNIFICANT CHANGE UP (ref 80–100)
PLATELET # BLD AUTO: 379 K/UL — SIGNIFICANT CHANGE UP (ref 150–400)
PROTHROM AB SERPL-ACNC: 21 SEC — HIGH (ref 10–13.1)
RBC # BLD: 3.55 M/UL — LOW (ref 3.8–5.2)
RBC # FLD: 16.7 % — HIGH (ref 10.3–14.5)
WBC # BLD: 12.26 K/UL — HIGH (ref 3.8–10.5)
WBC # FLD AUTO: 12.26 K/UL — HIGH (ref 3.8–10.5)

## 2018-07-24 RX ORDER — WARFARIN SODIUM 2.5 MG/1
2 TABLET ORAL ONCE
Qty: 0 | Refills: 0 | Status: COMPLETED | OUTPATIENT
Start: 2018-07-24 | End: 2018-07-24

## 2018-07-24 RX ORDER — HYDROMORPHONE HYDROCHLORIDE 2 MG/ML
0.5 INJECTION INTRAMUSCULAR; INTRAVENOUS; SUBCUTANEOUS ONCE
Qty: 0 | Refills: 0 | Status: DISCONTINUED | OUTPATIENT
Start: 2018-07-24 | End: 2018-07-24

## 2018-07-24 RX ADMIN — Medication 1 APPLICATION(S): at 22:30

## 2018-07-24 RX ADMIN — BUDESONIDE AND FORMOTEROL FUMARATE DIHYDRATE 2 PUFF(S): 160; 4.5 AEROSOL RESPIRATORY (INHALATION) at 18:28

## 2018-07-24 RX ADMIN — BUDESONIDE AND FORMOTEROL FUMARATE DIHYDRATE 2 PUFF(S): 160; 4.5 AEROSOL RESPIRATORY (INHALATION) at 06:20

## 2018-07-24 RX ADMIN — Medication 12.5 MILLIGRAM(S): at 06:20

## 2018-07-24 RX ADMIN — Medication 500 MILLIGRAM(S): at 11:08

## 2018-07-24 RX ADMIN — HYDROMORPHONE HYDROCHLORIDE 0.5 MILLIGRAM(S): 2 INJECTION INTRAMUSCULAR; INTRAVENOUS; SUBCUTANEOUS at 21:13

## 2018-07-24 RX ADMIN — Medication 1 APPLICATION(S): at 06:14

## 2018-07-24 RX ADMIN — MINOCYCLINE HYDROCHLORIDE 100 MILLIGRAM(S): 45 TABLET, EXTENDED RELEASE ORAL at 18:28

## 2018-07-24 RX ADMIN — Medication 2 TABLET(S): at 15:47

## 2018-07-24 RX ADMIN — Medication 1 TABLET(S): at 11:09

## 2018-07-24 RX ADMIN — Medication 650 MILLIGRAM(S): at 15:50

## 2018-07-24 RX ADMIN — Medication 650 MILLIGRAM(S): at 06:39

## 2018-07-24 RX ADMIN — Medication 50 MILLIGRAM(S): at 11:09

## 2018-07-24 RX ADMIN — Medication 2 TABLET(S): at 06:39

## 2018-07-24 RX ADMIN — HYDROMORPHONE HYDROCHLORIDE 0.5 MILLIGRAM(S): 2 INJECTION INTRAMUSCULAR; INTRAVENOUS; SUBCUTANEOUS at 10:16

## 2018-07-24 RX ADMIN — LORATADINE 10 MILLIGRAM(S): 10 TABLET ORAL at 11:08

## 2018-07-24 RX ADMIN — POLYETHYLENE GLYCOL 3350 17 GRAM(S): 17 POWDER, FOR SOLUTION ORAL at 18:28

## 2018-07-24 RX ADMIN — HYDROMORPHONE HYDROCHLORIDE 0.5 MILLIGRAM(S): 2 INJECTION INTRAMUSCULAR; INTRAVENOUS; SUBCUTANEOUS at 10:31

## 2018-07-24 RX ADMIN — Medication 81 MILLIGRAM(S): at 11:08

## 2018-07-24 RX ADMIN — Medication 1 APPLICATION(S): at 11:05

## 2018-07-24 RX ADMIN — GABAPENTIN 300 MILLIGRAM(S): 400 CAPSULE ORAL at 06:19

## 2018-07-24 RX ADMIN — MINOCYCLINE HYDROCHLORIDE 100 MILLIGRAM(S): 45 TABLET, EXTENDED RELEASE ORAL at 06:19

## 2018-07-24 RX ADMIN — SIMVASTATIN 20 MILLIGRAM(S): 20 TABLET, FILM COATED ORAL at 21:14

## 2018-07-24 RX ADMIN — PANTOPRAZOLE SODIUM 40 MILLIGRAM(S): 20 TABLET, DELAYED RELEASE ORAL at 06:19

## 2018-07-24 RX ADMIN — POLYETHYLENE GLYCOL 3350 17 GRAM(S): 17 POWDER, FOR SOLUTION ORAL at 06:19

## 2018-07-24 RX ADMIN — TIOTROPIUM BROMIDE 1 CAPSULE(S): 18 CAPSULE ORAL; RESPIRATORY (INHALATION) at 11:09

## 2018-07-24 RX ADMIN — Medication 3 MILLIGRAM(S): at 21:13

## 2018-07-24 RX ADMIN — GABAPENTIN 300 MILLIGRAM(S): 400 CAPSULE ORAL at 15:47

## 2018-07-24 RX ADMIN — GABAPENTIN 300 MILLIGRAM(S): 400 CAPSULE ORAL at 21:14

## 2018-07-24 RX ADMIN — Medication 0.25 MILLIGRAM(S): at 15:45

## 2018-07-24 RX ADMIN — Medication 1 MILLIGRAM(S): at 11:08

## 2018-07-24 RX ADMIN — Medication 1 APPLICATION(S): at 18:29

## 2018-07-24 RX ADMIN — Medication 650 MILLIGRAM(S): at 16:50

## 2018-07-24 RX ADMIN — Medication 650 MILLIGRAM(S): at 06:20

## 2018-07-24 RX ADMIN — Medication 12.5 MILLIGRAM(S): at 18:27

## 2018-07-24 RX ADMIN — WARFARIN SODIUM 2 MILLIGRAM(S): 2.5 TABLET ORAL at 21:12

## 2018-07-24 RX ADMIN — Medication 1 APPLICATION(S): at 10:30

## 2018-07-24 RX ADMIN — Medication 2 TABLET(S): at 21:16

## 2018-07-24 NOTE — PROGRESS NOTE ADULT - SUBJECTIVE AND OBJECTIVE BOX
Patient is a 77y old  Female who presents with a chief complaint of fever, lethargy (07 Jun 2018 22:19)      INTERVAL HPI/OVERNIGHT EVENTS: noted, feels well, no new complaints      Vital Signs Last 24 Hrs  T(C): 36.4 (24 Jul 2018 13:26), Max: 36.8 (23 Jul 2018 17:30)  T(F): 97.6 (24 Jul 2018 13:26), Max: 98.3 (23 Jul 2018 17:30)  HR: 64 (24 Jul 2018 13:26) (60 - 85)  BP: 139/78 (24 Jul 2018 13:26) (130/81 - 153/88)  BP(mean): --  RR: 18 (24 Jul 2018 13:26) (18 - 20)  SpO2: 99% (24 Jul 2018 13:26) (93% - 100%)    acetaminophen   Tablet. 650 milliGRAM(s) Oral every 6 hours PRN  ALPRAZolam 0.25 milliGRAM(s) Oral daily PRN  ascorbic acid 500 milliGRAM(s) Oral daily  aspirin enteric coated 81 milliGRAM(s) Oral daily  betamethasone valerate 0.1% Ointment 1 Application(s) Topical two times a day  bisacodyl Suppository 10 milliGRAM(s) Rectal daily PRN  bismuth subsalicylate Liquid 30 milliLiter(s) Oral every 6 hours PRN  buDESOnide 160 MICROgram(s)/formoterol 4.5 MICROgram(s) Inhaler 2 Puff(s) Inhalation two times a day  calcium carbonate  625 mG + Vitamin D (OsCal 250 + D) 2 Tablet(s) Oral three times a day  clobetasol 0.05% Ointment 1 Application(s) Topical two times a day  Dakins Solution - 1/4 Strength 1 Application(s) Topical two times a day  folic acid 1 milliGRAM(s) Oral daily  gabapentin 300 milliGRAM(s) Oral three times a day  loratadine 10 milliGRAM(s) Oral daily  magnesium hydroxide Suspension 30 milliLiter(s) Oral daily PRN  melatonin 3 milliGRAM(s) Oral at bedtime  metoprolol tartrate 12.5 milliGRAM(s) Oral two times a day  minocycline 100 milliGRAM(s) Oral two times a day  multivitamin 1 Tablet(s) Oral daily  pantoprazole    Tablet 40 milliGRAM(s) Oral before breakfast  polyethylene glycol 3350 17 Gram(s) Oral two times a day  predniSONE   Tablet 50 milliGRAM(s) Oral daily  senna 2 Tablet(s) Oral at bedtime PRN  simethicone 80 milliGRAM(s) Chew two times a day PRN  simvastatin 20 milliGRAM(s) Oral at bedtime  tiotropium 18 MICROgram(s) Capsule 1 Capsule(s) Inhalation daily      PHYSICAL EXAM:  GENERAL: NAD,   EYES: conjunctiva and sclera clear  ENMT: Moist mucous membranes  NECK: Supple, No JVD, Normal thyroid  NERVOUS SYSTEM:  Alert & Oriented X3,   CHEST/LUNG: Clear to auscultation bilaterally; No rales, rhonchi, wheezing, or rubs  HEART: Regular rate and rhythm; No murmurs, rubs, or gallops  ABDOMEN: Soft, Nontender, Nondistended; Bowel sounds present  EXTREMITIES:    1)Lower abd wound -healing well  2)rt knee-vac wound improved healing  3)rt medial thigh wound- margins indurated, healing slowly  4)Rt lateral thigh/buttock wound-large deep-necrotic tissue-packing done  5)rt lateral thigh 2 small holes-wound packing done  6) Lt medial thigh '2 small deep ulcers-wound packing  7)Lt lateral /post thigh: 2-3 small punched out ulcers-wound packing done.5      LYMPH: No lymphadenopathy noted  SKIN: No rashes or lesions    Consultant(s) Notes Reviewed:  [x ] YES  [ ] NO  Care Discussed with Consultants/Other Providers [ x] YES  [ ] NO    LABS:                        12.26 )-----------( 379      ( 24 Jul 2018 08:29 )             30.5     07-23    139  |  106  |  39<H>  ----------------------------<  137<H>  4.9   |  25  |  0.67    Ca    9.0      23 Jul 2018 06:54      PT/INR - ( 24 Jul 2018 09:50 )   PT: 21.0 sec;   INR: 1.84 ratio             CAPILLARY BLOOD GLUCOSE                RADIOLOGY & ADDITIONAL TESTS:    Imaging Personally Reviewed:  [x ] YES  [ ] NO

## 2018-07-24 NOTE — PROGRESS NOTE ADULT - ASSESSMENT
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 Pt completed 10 days meropenem.     # Abd wound, multiple bl thigh wounds- now seems to be stable   sp punch biopsy by derm 7/11, positive for urticaria, not consistent with PG  prior biopsies were not consistent with infection/vasculitis/malignancy   pt continues to develop new ulcers, pathergic response to biopsy sites  tissue culture grew coag neg staph likely colonized  IAs multiple biopsies have not been positive for PG,  continue vac to abdomen, rt Knee, rt lat thigh  appreciate wound care recs, daily dressing changes, wound vac changes every other day  Trial of steroids started Day 6 today - continue to monitor   ID, plastics, wound care team on the case.   ID rpt Cultures sent for afb and fungus    Heme/onc: the questions of possible coumadin induced necrosis. Discussed with Dr. Cardoza.   Coumadin necrosis unlikely given coumadin necrosis happen acutely with coumadin use.   okay to continue with coumadin. monitor INR.     # Pain control   cont dilaudid 2mg q4 PRN for severe pain  try IV tylenol 1g qd PRN for moderate pain  Try to avoid IV dilaudid, but okay to give 0.5mg IV before wound changes if necessary  can cont this in efforts to try to avoid dilaudid  gabapentin 300mg tid   Xanax 0.25 prior to dressing change  bowel regimen    # polymyalgia rheumatica  no signs of active rheum dz, evaluated by rheum 6/29    # TKR wound-mrsa  chronic minocycline suppression    # chr Anemia  stable, monitor h/h closely  appreciate heme recs    # S/P TAVR (transcatheter aortic valve replacement)  daily coumadin dosing, daily INR    # adjustment disorder with depressed mood  xanax prn    Plan of care dw patient,

## 2018-07-24 NOTE — PROGRESS NOTE ADULT - ASSESSMENT
7/20 was asked by Dr Joseph to comment on the possibility of the pt having coumadin necrosis. It is a very rare occurrence and generally occurs acutely. The cause of coumadin necrosis would occur almost immediately in pts with a low protein s and c level. Both of these are vitamin k dependant factors and with the start of coumadin both of these factors would decrease. The protein s and c are modulators of factor 5 and would on initiation cause local ulcers. Pts like this would also have had clots in the past. The pts is not likely to have this with her history here. Obtaining a protein s and c levels here would not be helpful as these levels will be low on the coumadin. It would take 3 weeks or so off the coumadin for these levels to come up to baseline. Since this a rare case we might consider stopping the warfarin and give the pt a heparin derivative. Antiphospholipid antibody syndrome is not likely in the setting of normal platelets normal ptt in the past and no history of arterial and venous clots, no heart valve clots etc.     7/24 stable and pt to be given steroids, there may be some healing of the wounds noted on ortho exam.

## 2018-07-25 LAB
APTT BLD: 34.6 SEC — SIGNIFICANT CHANGE UP (ref 27.5–37.4)
INR BLD: 2.6 RATIO — HIGH (ref 0.88–1.16)
PROTHROM AB SERPL-ACNC: 29.9 SEC — HIGH (ref 10–13.1)

## 2018-07-25 RX ORDER — WARFARIN SODIUM 2.5 MG/1
2 TABLET ORAL ONCE
Qty: 0 | Refills: 0 | Status: COMPLETED | OUTPATIENT
Start: 2018-07-25 | End: 2018-07-25

## 2018-07-25 RX ORDER — HYDROMORPHONE HYDROCHLORIDE 2 MG/ML
0.5 INJECTION INTRAMUSCULAR; INTRAVENOUS; SUBCUTANEOUS EVERY 12 HOURS
Qty: 0 | Refills: 0 | Status: DISCONTINUED | OUTPATIENT
Start: 2018-07-25 | End: 2018-07-31

## 2018-07-25 RX ADMIN — PANTOPRAZOLE SODIUM 40 MILLIGRAM(S): 20 TABLET, DELAYED RELEASE ORAL at 09:53

## 2018-07-25 RX ADMIN — HYDROMORPHONE HYDROCHLORIDE 0.5 MILLIGRAM(S): 2 INJECTION INTRAMUSCULAR; INTRAVENOUS; SUBCUTANEOUS at 10:19

## 2018-07-25 RX ADMIN — Medication 3 MILLIGRAM(S): at 22:46

## 2018-07-25 RX ADMIN — Medication 1 APPLICATION(S): at 18:17

## 2018-07-25 RX ADMIN — Medication 81 MILLIGRAM(S): at 14:54

## 2018-07-25 RX ADMIN — WARFARIN SODIUM 2 MILLIGRAM(S): 2.5 TABLET ORAL at 22:46

## 2018-07-25 RX ADMIN — GABAPENTIN 300 MILLIGRAM(S): 400 CAPSULE ORAL at 22:47

## 2018-07-25 RX ADMIN — Medication 2 TABLET(S): at 09:52

## 2018-07-25 RX ADMIN — GABAPENTIN 300 MILLIGRAM(S): 400 CAPSULE ORAL at 14:55

## 2018-07-25 RX ADMIN — Medication 1 APPLICATION(S): at 22:47

## 2018-07-25 RX ADMIN — POLYETHYLENE GLYCOL 3350 17 GRAM(S): 17 POWDER, FOR SOLUTION ORAL at 18:10

## 2018-07-25 RX ADMIN — Medication 12.5 MILLIGRAM(S): at 09:53

## 2018-07-25 RX ADMIN — Medication 50 MILLIGRAM(S): at 14:57

## 2018-07-25 RX ADMIN — GABAPENTIN 300 MILLIGRAM(S): 400 CAPSULE ORAL at 09:53

## 2018-07-25 RX ADMIN — Medication 1 APPLICATION(S): at 12:30

## 2018-07-25 RX ADMIN — Medication 500 MILLIGRAM(S): at 14:57

## 2018-07-25 RX ADMIN — MINOCYCLINE HYDROCHLORIDE 100 MILLIGRAM(S): 45 TABLET, EXTENDED RELEASE ORAL at 09:53

## 2018-07-25 RX ADMIN — Medication 2 TABLET(S): at 14:55

## 2018-07-25 RX ADMIN — BUDESONIDE AND FORMOTEROL FUMARATE DIHYDRATE 2 PUFF(S): 160; 4.5 AEROSOL RESPIRATORY (INHALATION) at 09:53

## 2018-07-25 RX ADMIN — SIMVASTATIN 20 MILLIGRAM(S): 20 TABLET, FILM COATED ORAL at 22:47

## 2018-07-25 RX ADMIN — Medication 1 MILLIGRAM(S): at 14:57

## 2018-07-25 RX ADMIN — HYDROMORPHONE HYDROCHLORIDE 0.5 MILLIGRAM(S): 2 INJECTION INTRAMUSCULAR; INTRAVENOUS; SUBCUTANEOUS at 22:30

## 2018-07-25 RX ADMIN — HYDROMORPHONE HYDROCHLORIDE 0.5 MILLIGRAM(S): 2 INJECTION INTRAMUSCULAR; INTRAVENOUS; SUBCUTANEOUS at 22:50

## 2018-07-25 RX ADMIN — Medication 1 APPLICATION(S): at 12:29

## 2018-07-25 RX ADMIN — HYDROMORPHONE HYDROCHLORIDE 0.5 MILLIGRAM(S): 2 INJECTION INTRAMUSCULAR; INTRAVENOUS; SUBCUTANEOUS at 10:40

## 2018-07-25 RX ADMIN — MINOCYCLINE HYDROCHLORIDE 100 MILLIGRAM(S): 45 TABLET, EXTENDED RELEASE ORAL at 18:12

## 2018-07-25 RX ADMIN — Medication 12.5 MILLIGRAM(S): at 18:10

## 2018-07-25 RX ADMIN — TIOTROPIUM BROMIDE 1 CAPSULE(S): 18 CAPSULE ORAL; RESPIRATORY (INHALATION) at 14:54

## 2018-07-25 RX ADMIN — Medication 1 APPLICATION(S): at 06:01

## 2018-07-25 RX ADMIN — LORATADINE 10 MILLIGRAM(S): 10 TABLET ORAL at 14:55

## 2018-07-25 RX ADMIN — Medication 1 TABLET(S): at 14:55

## 2018-07-25 RX ADMIN — Medication 2 TABLET(S): at 22:47

## 2018-07-25 RX ADMIN — BUDESONIDE AND FORMOTEROL FUMARATE DIHYDRATE 2 PUFF(S): 160; 4.5 AEROSOL RESPIRATORY (INHALATION) at 18:10

## 2018-07-25 RX ADMIN — Medication 1 APPLICATION(S): at 22:45

## 2018-07-25 NOTE — PROGRESS NOTE ADULT - ASSESSMENT
A/P: s/p Right total knee insertion of spacer, irrigation and debridement, complex wound closure 3/23; readmitted 6/8 for appendicitis    - Management per primary team  - cont vac/dressing changes to R knee, thigh, and abdomen M/W/F  - remainder of wounds per wound care/derm  - ID following  - ok for discharge from [facility] perspective    Plastic Surgery (pg TOYA: [id number], NS: [phone])

## 2018-07-25 NOTE — PROGRESS NOTE BEHAVIORAL HEALTH - SUMMARY
76 y/o  F  with 3 adult children and 8 grandchildren, with charted PPHx of anxiety, with PMHx of TKR c/b joint infection, CAD s/p HERBERT, severe AS (s/p TAVR & PPM 12/17 [place] [employer] Model [id number]), h/o MVR, PMR on chronic steroids, asthma, OA, HLD, GERD, Anemia, thigh hematoma with fat necrosis, chronic RLE wound, originally p/w fever on 4/8, found to be septic, [facility] Psych initially following patient on previous admission tearfulness/low mood was discharged last week to rehab and was readmitted medically for appendicitis.  Psychiatry called today to follow-up for continued tearfulness and low mood in the context of prolonged hospitalization. Pt presents as calm and at times making jokes and smiling, reportedly tearful according to daughter at bedside, in the context of prolonged hospitalization, disability, and pain. Denying other symptoms of depression, active suicidal intent/plan, anxiety, AH/VH, and fear of harm/paranoia. A&Ox4/4. Future oriented reports she wants to get better so she can return home and help her grand daughter plan her wedding.  Presence of supportive familial relationships. Supportive psychotherapy provided at bedside. No indication for pharmacotherapy at this time given risks and unlikely benefit for adjustment d/o.  Patient is not receptive to starting any new medications at this time.  Pt seen today, july 25th, feels frustrated over being in the hospital. Currently denying depression. No si/hi.

## 2018-07-25 NOTE — CHART NOTE - NSCHARTNOTEFT_GEN_A_CORE
Late entry  Night NP - Episodic  Called by RN with patient expressing "not wanting to live" per RN. Upon assessment, pt did not expressed thoughts of suicide, homicide or not wanting to live. Pt placed on 1:1 for safety until psych re evaluates. Pt remains A&OX3, NAD and able to make needs known. AM team to follow.     T(C): 36.4 (25 Jul 2018 05:45), Max: 36.8 (24 Jul 2018 16:00)  T(F): 97.5 (25 Jul 2018 05:45), Max: 98.3 (24 Jul 2018 16:00)  HR: 60 (25 Jul 2018 05:45) (60 - 68)  BP: 150/72 (25 Jul 2018 05:45) (130/81 - 150/72)  BP(mean): --  RR: 18 (25 Jul 2018 05:45) (18 - 18)  SpO2: 97% (25 Jul 2018 05:45) (94% - 100%)      Sade Amezcua ANP-BC  [id number]

## 2018-07-25 NOTE — CHART NOTE - NSCHARTNOTEFT_GEN_A_CORE
S= Pt expressed suicidal ideation  HPI; Pt has a lengthy course of hospitalization from chronic ulcer wounds and pain management. Pain is improving according to patient  NP arrived in room to examine patient with the presence of the day nurse, night NP josefa and night nurse. Pt was interviewed and expressed strongly that she doesn't know why an aide is sitting in her room all night. I informed her that the RN reported that she wanted to kill herself. Pt expressed to me that she would NEVER KILL HERSELF and that please to  not belittle her. Pt started to cry and stated she just want to go home. Pt has a private aide 24 hrs around the clock.     A/P depression secondary to prolonged hospitalization  Pt refused to see a psychiatry or  however the NP expressed to the patient an aide to inform the medical team if she changes her mind. In perspective, patient has been here since june 7th and it is expected for her to have adjustment disorder. Will monitor patient's mood and address as needed. Thus far will discontinue 1:1 and monitor. S= Pt expressed suicidal ideation  HPI; Pt has a lengthy course of hospitalization from chronic ulcer wounds and pain management. Pain is improving according to patient    Pt seen in the room awake and alert x3. Pt was interviewed and expressed strongly that she doesn't know why an aide is sitting in her room all night. I informed her that the RN reported that she wanted to kill herself. Pt expressed to me that she would NEVER KILL HERSELF and that please to  not belittle her. Pt started to cry and stated she just want to go home. Pt has a private aide 24 hrs around the clock. Patient refused psychiatry evaluation at this point     A/P depression secondary to prolonged hospitalization  Pt refused to see a psychiatry or  however the NP expressed to the patient an aide to inform the medical team if she changes her mind. In perspective, patient has been here since june 7th and it is expected for her to have adjustment disorder. Will monitor patient's mood and address as needed. Thus far will discontinue 1:1 and monitor.

## 2018-07-25 NOTE — PROGRESS NOTE BEHAVIORAL HEALTH - NSBHCONSULTFOLLOWAFTERCARE_PSY_A_CORE FT
per primary team  Patient may follow up with [facility] [phone]

## 2018-07-25 NOTE — PROGRESS NOTE ADULT - SUBJECTIVE AND OBJECTIVE BOX
Patient is a 77y old  Female who presents with a chief complaint of fever, lethargy (07 Jun 2018 22:19)      INTERVAL HPI/OVERNIGHT EVENTS: no events      Vital Signs Last 24 Hrs  T(C): 36.4 (25 Jul 2018 10:02), Max: 36.8 (24 Jul 2018 16:00)  T(F): 97.6 (25 Jul 2018 10:02), Max: 98.3 (24 Jul 2018 16:00)  HR: 59 (25 Jul 2018 10:02) (59 - 68)  BP: 148/73 (25 Jul 2018 10:02) (145/69 - 150/72)  BP(mean): --  RR: 18 (25 Jul 2018 10:02) (18 - 18)  SpO2: 94% (25 Jul 2018 10:02) (94% - 97%)    acetaminophen   Tablet. 650 milliGRAM(s) Oral every 6 hours PRN  ALPRAZolam 0.25 milliGRAM(s) Oral daily PRN  ascorbic acid 500 milliGRAM(s) Oral daily  aspirin enteric coated 81 milliGRAM(s) Oral daily  betamethasone valerate 0.1% Ointment 1 Application(s) Topical two times a day  bisacodyl Suppository 10 milliGRAM(s) Rectal daily PRN  bismuth subsalicylate Liquid 30 milliLiter(s) Oral every 6 hours PRN  buDESOnide 160 MICROgram(s)/formoterol 4.5 MICROgram(s) Inhaler 2 Puff(s) Inhalation two times a day  calcium carbonate  625 mG + Vitamin D (OsCal 250 + D) 2 Tablet(s) Oral three times a day  clobetasol 0.05% Ointment 1 Application(s) Topical two times a day  Dakins Solution - 1/4 Strength 1 Application(s) Topical two times a day  folic acid 1 milliGRAM(s) Oral daily  gabapentin 300 milliGRAM(s) Oral three times a day  HYDROmorphone  Injectable 0.5 milliGRAM(s) IV Push every 12 hours PRN  loratadine 10 milliGRAM(s) Oral daily  magnesium hydroxide Suspension 30 milliLiter(s) Oral daily PRN  melatonin 3 milliGRAM(s) Oral at bedtime  metoprolol tartrate 12.5 milliGRAM(s) Oral two times a day  minocycline 100 milliGRAM(s) Oral two times a day  multivitamin 1 Tablet(s) Oral daily  pantoprazole    Tablet 40 milliGRAM(s) Oral before breakfast  polyethylene glycol 3350 17 Gram(s) Oral two times a day  predniSONE   Tablet 50 milliGRAM(s) Oral daily  senna 2 Tablet(s) Oral at bedtime PRN  simethicone 80 milliGRAM(s) Chew two times a day PRN  simvastatin 20 milliGRAM(s) Oral at bedtime  tiotropium 18 MICROgram(s) Capsule 1 Capsule(s) Inhalation daily      PHYSICAL EXAM:  GENERAL: NAD,   EYES: conjunctiva and sclera clear  ENMT: Moist mucous membranes  NECK: Supple, No JVD, Normal thyroid  NERVOUS SYSTEM:  Alert & Oriented X3,   CHEST/LUNG: Clear to auscultation bilaterally; No rales, rhonchi, wheezing, or rubs  HEART: Regular rate and rhythm; No murmurs, rubs, or gallops  ABDOMEN: Soft, Nontender, Nondistended; Bowel sounds present  EXTREMITIES:    1)Lower abd wound -healing well  2)rt knee-vac wound improved healing  3)rt medial thigh wound- margins indurated, healing slowly  4)Rt lateral thigh/buttock wound-large deep-necrotic tissue-packing done  5)rt lateral thigh 2 small holes-wound packing done  6) Lt medial thigh '2 small deep ulcers-wound packing  7)Lt lateral /post thigh: 2-3 small punched out ulcers-wound packing done.5      LYMPH: No lymphadenopathy noted  SKIN: No rashes or lesions    Consultant(s) Notes Reviewed:  [x ] YES  [ ] NO  Care Discussed with Consultants/Other Providers [ x] YES  [ ] NO    LABS:                        9.5    12.26 )-----------( 379      ( 24 Jul 2018 08:29 )             30.5           PT/INR - ( 25 Jul 2018 08:03 )   PT: 29.9 sec;   INR: 2.60 ratio         PTT - ( 25 Jul 2018 08:03 )  PTT:34.6 sec    CAPILLARY BLOOD GLUCOSE                RADIOLOGY & ADDITIONAL TESTS:    Imaging Personally Reviewed:  [ ] YES  [ ] NO

## 2018-07-25 NOTE — PROGRESS NOTE BEHAVIORAL HEALTH - NSBHCONSULTRECOMMENDOTHER_PSY_A_CORE FT
1) No standing psychiatric medications recommended at this time.  2) Continue to recommend melatonin 3mg po hs, and gabapentin and xanax as ordered  3) supportive therapy provided  4) pt not suicidal, states she made a comment regarding self harm out of frustration; no need for 1:1 and no need for inpt psychiatric admission

## 2018-07-25 NOTE — PROGRESS NOTE BEHAVIORAL HEALTH - AXIS III
TKR c/b joint infection, CAD s/p HERBERT, severe AS (s/p TAVR & PPM 12/17 [place] [employer] Model [id number]), h/o MVR, PMR on chronic steroids, asthma, OA, HLD, GERD, Anemia, thigh hematoma with fat necrosis, chronic RLE wound, appendicitis

## 2018-07-25 NOTE — PROGRESS NOTE BEHAVIORAL HEALTH - NSBHFUPINTERVALHXFT_PSY_A_CORE
Pt seen, psychiatry re-consulted to address possible depression, as pt allegedly stated she wanted to die. Pt seen, AOA x 3, states she made the comment out of frustration, and states "I'm not depressed, there's nothing wrong with me." She reports ongoing frustration with being in the hospital. Sleep and appetite are reported to be fair. No si/hi.

## 2018-07-25 NOTE — PROGRESS NOTE ADULT - SUBJECTIVE AND OBJECTIVE BOX
Plastic Surgery Progress Note (pg [facility]: [id number], [facility]: [phone])    SUBJECTIVE:  The patient was seen and examined. No acute events overnight.    OBJECTIVE:     ** VITAL SIGNS / I&O's **    Vital Signs Last 24 Hrs  T(C): 36.4 (25 Jul 2018 10:02), Max: 36.8 (24 Jul 2018 16:00)  T(F): 97.6 (25 Jul 2018 10:02), Max: 98.3 (24 Jul 2018 16:00)  HR: 59 (25 Jul 2018 10:02) (59 - 68)  BP: 148/73 (25 Jul 2018 10:02) (145/69 - 150/72)  BP(mean): --  RR: 18 (25 Jul 2018 10:02) (18 - 18)  SpO2: 94% (25 Jul 2018 10:02) (94% - 97%)      24 Jul 2018 07:01  -  25 Jul 2018 07:00  --------------------------------------------------------  IN:    Oral Fluid: 240 mL  Total IN: 240 mL    OUT:    Voided: 825 mL  Total OUT: 825 mL    Total NET: -585 mL          ** PHYSICAL EXAM **    -- CONSTITUTIONAL: Alert, NAD   -- ABDOMEN: central abd VAC intact and set to suction.   -- EXTREMITIES: W2D dressing on R lateral thigh, R knee vac holding suction. R medial thigh- promogran dressing in place.    ** LABS **                          9.5    12.26 )-----------( 379      ( 24 Jul 2018 08:29 )             30.5           PT/INR - ( 25 Jul 2018 08:03 )   PT: 29.9 sec;   INR: 2.60 ratio         PTT - ( 25 Jul 2018 08:03 )  PTT:34.6 sec  CAPILLARY BLOOD GLUCOSE                    ** MEDICATIONS **  MEDICATIONS  (STANDING):  ascorbic acid 500 milliGRAM(s) Oral daily  aspirin enteric coated 81 milliGRAM(s) Oral daily  betamethasone valerate 0.1% Ointment 1 Application(s) Topical two times a day  buDESOnide 160 MICROgram(s)/formoterol 4.5 MICROgram(s) Inhaler 2 Puff(s) Inhalation two times a day  calcium carbonate  625 mG + Vitamin D (OsCal 250 + D) 2 Tablet(s) Oral three times a day  clobetasol 0.05% Ointment 1 Application(s) Topical two times a day  Dakins Solution - 1/4 Strength 1 Application(s) Topical two times a day  folic acid 1 milliGRAM(s) Oral daily  gabapentin 300 milliGRAM(s) Oral three times a day  loratadine 10 milliGRAM(s) Oral daily  melatonin 3 milliGRAM(s) Oral at bedtime  metoprolol tartrate 12.5 milliGRAM(s) Oral two times a day  minocycline 100 milliGRAM(s) Oral two times a day  multivitamin 1 Tablet(s) Oral daily  pantoprazole    Tablet 40 milliGRAM(s) Oral before breakfast  polyethylene glycol 3350 17 Gram(s) Oral two times a day  predniSONE   Tablet 50 milliGRAM(s) Oral daily  simvastatin 20 milliGRAM(s) Oral at bedtime  tiotropium 18 MICROgram(s) Capsule 1 Capsule(s) Inhalation daily  warfarin 2 milliGRAM(s) Oral once    MEDICATIONS  (PRN):  acetaminophen   Tablet. 650 milliGRAM(s) Oral every 6 hours PRN Mild Pain (1 - 3)  ALPRAZolam 0.25 milliGRAM(s) Oral daily PRN prior to dressing change ---anxiety  bisacodyl Suppository 10 milliGRAM(s) Rectal daily PRN Constipation  bismuth subsalicylate Liquid 30 milliLiter(s) Oral every 6 hours PRN Cramping/abd pain  HYDROmorphone  Injectable 0.5 milliGRAM(s) IV Push every 12 hours PRN please give before wound changes  magnesium hydroxide Suspension 30 milliLiter(s) Oral daily PRN Constipation  senna 2 Tablet(s) Oral at bedtime PRN Constipation  simethicone 80 milliGRAM(s) Chew two times a day PRN Gas

## 2018-07-25 NOTE — PROGRESS NOTE ADULT - ASSESSMENT
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 Pt completed 10 days meropenem.     # Abd wound, multiple bl thigh wounds- now seems to be stable   sp punch biopsy by derm 7/11, positive for urticaria, not consistent with PG  prior biopsies were not consistent with infection/vasculitis/malignancy   pt continues to develop new ulcers, pathergic response to biopsy sites  tissue culture grew coag neg staph likely colonized  IAs multiple biopsies have not been positive for PG,  continue vac to abdomen, rt Knee, rt lat thigh  appreciate wound care recs, daily dressing changes, wound vac changes every other day  Trial of steroids started Day 6 today - continue to monitor   ID, plastics, wound care team on the case.   ID rpt Cultures sent for afb and fungus    Heme/onc: the questions of possible coumadin induced necrosis. Discussed with Dr. Cardoza.   Coumadin necrosis unlikely given coumadin necrosis happen acutely with coumadin use.   okay to continue with coumadin. monitor INR.     # Pain control   cont dilaudid 2mg q4 PRN for severe pain  try IV tylenol 1g qd PRN for moderate pain  Try to avoid IV dilaudid, but okay to give 0.5mg IV before wound changes if necessary  can cont this in efforts to try to avoid dilaudid  gabapentin 300mg tid   Xanax 0.25 prior to dressing change  bowel regimen    # polymyalgia rheumatica  no signs of active rheum dz, evaluated by rheum 6/29    # TKR wound-mrsa  chronic minocycline suppression    # chr Anemia  stable, monitor h/h closely  appreciate heme recs    # S/P TAVR (transcatheter aortic valve replacement)  daily coumadin dosing, daily INR    # adjustment disorder with depressed mood  xanax prn    Plan of care dw patient,

## 2018-07-25 NOTE — PROGRESS NOTE BEHAVIORAL HEALTH - NSBHCHARTREVIEWVS_PSY_A_CORE FT
T(C): 36.4 (07-25-18 @ 10:02), Max: 36.8 (07-24-18 @ 16:00)  HR: 59 (07-25-18 @ 10:02) (59 - 68)  BP: 148/73 (07-25-18 @ 10:02) (145/69 - 150/72)  RR: 18 (07-25-18 @ 10:02) (18 - 18)  SpO2: 94% (07-25-18 @ 10:02) (94% - 97%)  Wt(kg): --

## 2018-07-26 LAB
INR BLD: 3.25 RATIO — HIGH (ref 0.88–1.16)
PROTHROM AB SERPL-ACNC: 37.6 SEC — HIGH (ref 10–13.1)

## 2018-07-26 RX ADMIN — Medication 81 MILLIGRAM(S): at 12:28

## 2018-07-26 RX ADMIN — Medication 1 MILLIGRAM(S): at 12:28

## 2018-07-26 RX ADMIN — Medication 1 APPLICATION(S): at 18:46

## 2018-07-26 RX ADMIN — Medication 1 APPLICATION(S): at 23:12

## 2018-07-26 RX ADMIN — Medication 12.5 MILLIGRAM(S): at 17:49

## 2018-07-26 RX ADMIN — TIOTROPIUM BROMIDE 1 CAPSULE(S): 18 CAPSULE ORAL; RESPIRATORY (INHALATION) at 12:28

## 2018-07-26 RX ADMIN — HYDROMORPHONE HYDROCHLORIDE 0.5 MILLIGRAM(S): 2 INJECTION INTRAMUSCULAR; INTRAVENOUS; SUBCUTANEOUS at 10:39

## 2018-07-26 RX ADMIN — Medication 2 TABLET(S): at 15:11

## 2018-07-26 RX ADMIN — BUDESONIDE AND FORMOTEROL FUMARATE DIHYDRATE 2 PUFF(S): 160; 4.5 AEROSOL RESPIRATORY (INHALATION) at 06:07

## 2018-07-26 RX ADMIN — MINOCYCLINE HYDROCHLORIDE 100 MILLIGRAM(S): 45 TABLET, EXTENDED RELEASE ORAL at 06:07

## 2018-07-26 RX ADMIN — Medication 3 MILLIGRAM(S): at 23:11

## 2018-07-26 RX ADMIN — GABAPENTIN 300 MILLIGRAM(S): 400 CAPSULE ORAL at 23:14

## 2018-07-26 RX ADMIN — LORATADINE 10 MILLIGRAM(S): 10 TABLET ORAL at 12:28

## 2018-07-26 RX ADMIN — MINOCYCLINE HYDROCHLORIDE 100 MILLIGRAM(S): 45 TABLET, EXTENDED RELEASE ORAL at 17:49

## 2018-07-26 RX ADMIN — HYDROMORPHONE HYDROCHLORIDE 0.5 MILLIGRAM(S): 2 INJECTION INTRAMUSCULAR; INTRAVENOUS; SUBCUTANEOUS at 23:06

## 2018-07-26 RX ADMIN — GABAPENTIN 300 MILLIGRAM(S): 400 CAPSULE ORAL at 06:08

## 2018-07-26 RX ADMIN — Medication 50 MILLIGRAM(S): at 12:28

## 2018-07-26 RX ADMIN — Medication 1 TABLET(S): at 12:28

## 2018-07-26 RX ADMIN — Medication 2 TABLET(S): at 23:11

## 2018-07-26 RX ADMIN — Medication 1 APPLICATION(S): at 12:29

## 2018-07-26 RX ADMIN — HYDROMORPHONE HYDROCHLORIDE 0.5 MILLIGRAM(S): 2 INJECTION INTRAMUSCULAR; INTRAVENOUS; SUBCUTANEOUS at 23:21

## 2018-07-26 RX ADMIN — BUDESONIDE AND FORMOTEROL FUMARATE DIHYDRATE 2 PUFF(S): 160; 4.5 AEROSOL RESPIRATORY (INHALATION) at 17:49

## 2018-07-26 RX ADMIN — Medication 500 MILLIGRAM(S): at 12:28

## 2018-07-26 RX ADMIN — SIMVASTATIN 20 MILLIGRAM(S): 20 TABLET, FILM COATED ORAL at 23:11

## 2018-07-26 RX ADMIN — PANTOPRAZOLE SODIUM 40 MILLIGRAM(S): 20 TABLET, DELAYED RELEASE ORAL at 06:07

## 2018-07-26 RX ADMIN — Medication 1 APPLICATION(S): at 23:14

## 2018-07-26 RX ADMIN — POLYETHYLENE GLYCOL 3350 17 GRAM(S): 17 POWDER, FOR SOLUTION ORAL at 06:06

## 2018-07-26 RX ADMIN — HYDROMORPHONE HYDROCHLORIDE 0.5 MILLIGRAM(S): 2 INJECTION INTRAMUSCULAR; INTRAVENOUS; SUBCUTANEOUS at 10:55

## 2018-07-26 RX ADMIN — Medication 2 TABLET(S): at 06:08

## 2018-07-26 RX ADMIN — GABAPENTIN 300 MILLIGRAM(S): 400 CAPSULE ORAL at 15:11

## 2018-07-26 RX ADMIN — Medication 1 APPLICATION(S): at 06:09

## 2018-07-26 RX ADMIN — Medication 12.5 MILLIGRAM(S): at 06:07

## 2018-07-26 NOTE — PROGRESS NOTE ADULT - SUBJECTIVE AND OBJECTIVE BOX
CC: f/u for MRSA knee infection and multiple wounds    Patient comfortable, arouses easily, more responsive c/w my last eval; confused at times, asking to go home    REVIEW OF SYSTEMS:  All other review of systems negative (Comprehensive ROS)    Antimicrobials:  long term minocycline 100 milliGRAM(s) Oral two times a day  Medications Reviewed    Vital Signs Last 24 Hrs  T(F): 97.6 (26 Jul 2018 10:35), Max: 98.3 (25 Jul 2018 18:09)  HR: 60 (26 Jul 2018 10:35) (60 - 64)  BP: 160/85 (26 Jul 2018 10:35) (132/84 - 160/85)  BP(mean): --  RR: 18 (26 Jul 2018 10:35) (18 - 20)  SpO2: 97% (26 Jul 2018 10:35) (94% - 97%)    PHYSICAL EXAM:  General: alert, no acute distress  Eyes:  anicteric, no conjunctival injection, no discharge  Oropharynx: no lesions or injection 	  Neck: supple, without adenopathy  Lungs: clear to auscultation  Heart: regular rate and rhythm; no murmur, rubs or gallops  Abdomen: soft, nondistended, nontender, wound vac in place  Skin: multiple lesions as described   Extremities:  dependent edema; R knee VAC- overall smaller wound  Neurologic: alert, oriented, moves all extremities    LAB RESULTS:  no new data    MICROBIOLOGY:  RECENT CULTURES:  Culture - Fungal, Other (07.20.18 @ 17:13)    Specimen Source: .Other Other, hip ulcer    Culture Results:   Testing in progress    Culture - Acid Fast - Other w/Smear (07.20.18 @ 17:13)    Specimen Source: .Other Other, right hip ulcer    Acid Fast Bacilli Smear:   No acid fast bacilli seen by fluorochrome stain      RADIOLOGY REVIEWED:  Xray Chest 1 View- PORTABLE-Routine (07.13.18 @ 16:39) >  No focal consolidation.

## 2018-07-26 NOTE — PROGRESS NOTE ADULT - ASSESSMENT
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 Pt completed 10 days meropenem.     # Abd wound, multiple bl thigh wounds- now seems to be stable   sp punch biopsy by derm 7/11, positive for urticaria, not consistent with PG  prior biopsies were not consistent with infection/vasculitis/malignancy   pt continues to develop new ulcers, pathergic response to biopsy sites  tissue culture grew coag neg staph likely colonized  IAs multiple biopsies have not been positive for PG,  continue vac to abdomen, rt Knee, rt lat thigh  appreciate wound care recs, daily dressing changes, wound vac changes every other day  Trial of steroids started for >1wk - minimal wound improvemnt  ID, plastics, wound care team on the case.   ID rpt Cultures sent for afb and fungus to f/u    Heme/onc: the questions of possible coumadin induced necrosis.   heme c/s noted-Coumadin necrosis unlikely given coumadin necrosis happen acutely with coumadin use.   okay to continue with coumadin. monitor INR.     # Pain control   cont dilaudid 2mg q4 PRN for severe pain  try IV tylenol 1g qd PRN for moderate pain  Try to avoid IV dilaudid, but okay to give 0.5mg IV before wound changes if necessary  can cont this in efforts to try to avoid dilaudid  gabapentin 300mg tid   Xanax 0.25 prior to dressing change  bowel regimen    # polymyalgia rheumatica  no signs of active rheum dz, evaluated by rheum 6/29    # TKR wound-mrsa  chronic minocycline suppression    # chr Anemia  stable, monitor h/h closely  appreciate heme recs    # S/P TAVR (transcatheter aortic valve replacement)  daily coumadin dosing, daily INR    # adjustment disorder with depressed mood  xanax prn    Plan of care dw patient,

## 2018-07-26 NOTE — PROGRESS NOTE ADULT - ASSESSMENT
Morbid Obesity  PMR  Failed RT TKA with initial strep infection followed by MRSA infected spacer  Spontaneous areas of fat necrosis, pyoderma gangrenosum vs. panniculitis as per Derm, improving with steroids  Specimens fungal/AFB negative thus far  More alert, afebrile    Suggest:  Continue mcn for R knee spacer MRSA infection  Steroid trial as outlined  Local wound care, VAC  D/w aide at bedside

## 2018-07-26 NOTE — PROGRESS NOTE ADULT - SUBJECTIVE AND OBJECTIVE BOX
Patient is a 77y old  Female who presents with a chief complaint of fever, lethargy (07 Jun 2018 22:19)      INTERVAL HPI/OVERNIGHT EVENTS: noted, feels well      Vital Signs Last 24 Hrs  T(C): 36.4 (26 Jul 2018 10:35), Max: 36.8 (25 Jul 2018 18:09)  T(F): 97.6 (26 Jul 2018 10:35), Max: 98.3 (25 Jul 2018 18:09)  HR: 60 (26 Jul 2018 10:35) (60 - 64)  BP: 160/85 (26 Jul 2018 10:35) (132/84 - 160/85)  BP(mean): --  RR: 18 (26 Jul 2018 10:35) (18 - 20)  SpO2: 97% (26 Jul 2018 10:35) (94% - 97%)    acetaminophen   Tablet. 650 milliGRAM(s) Oral every 6 hours PRN  ascorbic acid 500 milliGRAM(s) Oral daily  aspirin enteric coated 81 milliGRAM(s) Oral daily  betamethasone valerate 0.1% Ointment 1 Application(s) Topical two times a day  bisacodyl Suppository 10 milliGRAM(s) Rectal daily PRN  bismuth subsalicylate Liquid 30 milliLiter(s) Oral every 6 hours PRN  buDESOnide 160 MICROgram(s)/formoterol 4.5 MICROgram(s) Inhaler 2 Puff(s) Inhalation two times a day  calcium carbonate  625 mG + Vitamin D (OsCal 250 + D) 2 Tablet(s) Oral three times a day  clobetasol 0.05% Ointment 1 Application(s) Topical two times a day  Dakins Solution - 1/4 Strength 1 Application(s) Topical two times a day  folic acid 1 milliGRAM(s) Oral daily  gabapentin 300 milliGRAM(s) Oral three times a day  HYDROmorphone  Injectable 0.5 milliGRAM(s) IV Push every 12 hours PRN  loratadine 10 milliGRAM(s) Oral daily  magnesium hydroxide Suspension 30 milliLiter(s) Oral daily PRN  melatonin 3 milliGRAM(s) Oral at bedtime  metoprolol tartrate 12.5 milliGRAM(s) Oral two times a day  minocycline 100 milliGRAM(s) Oral two times a day  multivitamin 1 Tablet(s) Oral daily  pantoprazole    Tablet 40 milliGRAM(s) Oral before breakfast  polyethylene glycol 3350 17 Gram(s) Oral two times a day  predniSONE   Tablet 50 milliGRAM(s) Oral daily  senna 2 Tablet(s) Oral at bedtime PRN  simethicone 80 milliGRAM(s) Chew two times a day PRN  simvastatin 20 milliGRAM(s) Oral at bedtime  tiotropium 18 MICROgram(s) Capsule 1 Capsule(s) Inhalation daily      PHYSICAL EXAM:  GENERAL: NAD,   EYES: conjunctiva and sclera clear  ENMT: Moist mucous membranes  NECK: Supple, No JVD, Normal thyroid  NERVOUS SYSTEM:  Alert & Oriented X3,   CHEST/LUNG: Clear to auscultation bilaterally; No rales, rhonchi, wheezing, or rubs  HEART: Regular rate and rhythm; No murmurs, rubs, or gallops  ABDOMEN: Soft, Nontender, Nondistended; Bowel sounds present  EXTREMITIES:   1)Lower abd wound -healing well  2)rt knee-vac wound improved healing  3)rt medial thigh wound- margins indurated, healing slowly  4)Rt lateral thigh/buttock wound-large deep-necrotic tissue-packing   5)rt lateral thigh 2 small holes-wound packing done  6) Lt medial thigh '2 small deep ulcers-wound packing  7)Lt lateral /post thigh: 2-3 small punched out ulcers-wound packing   LYMPH: No lymphadenopathy noted  SKIN: No rashes or lesions    Consultant(s) Notes Reviewed:  [x ] YES  [ ] NO  Care Discussed with Consultants/Other Providers [ x] YES  [ ] NO    LABS:          PT/INR - ( 26 Jul 2018 09:06 )   PT: 37.6 sec;   INR: 3.25 ratio         PTT - ( 25 Jul 2018 08:03 )  PTT:34.6 sec    CAPILLARY BLOOD GLUCOSE                RADIOLOGY & ADDITIONAL TESTS:    Imaging Personally Reviewed:  [x ] YES  [ ] NO

## 2018-07-27 LAB
INR BLD: 3.51 RATIO — HIGH (ref 0.88–1.16)
PROTHROM AB SERPL-ACNC: 40.7 SEC — HIGH (ref 10–13.1)

## 2018-07-27 PROCEDURE — 99232 SBSQ HOSP IP/OBS MODERATE 35: CPT | Mod: 25

## 2018-07-27 PROCEDURE — 97606 NEG PRS WND THER DME>50 SQCM: CPT

## 2018-07-27 RX ORDER — TRAMADOL HYDROCHLORIDE 50 MG/1
25 TABLET ORAL ONCE
Qty: 0 | Refills: 0 | Status: DISCONTINUED | OUTPATIENT
Start: 2018-07-27 | End: 2018-07-27

## 2018-07-27 RX ADMIN — HYDROMORPHONE HYDROCHLORIDE 0.5 MILLIGRAM(S): 2 INJECTION INTRAMUSCULAR; INTRAVENOUS; SUBCUTANEOUS at 12:30

## 2018-07-27 RX ADMIN — TRAMADOL HYDROCHLORIDE 25 MILLIGRAM(S): 50 TABLET ORAL at 17:30

## 2018-07-27 RX ADMIN — HYDROMORPHONE HYDROCHLORIDE 0.5 MILLIGRAM(S): 2 INJECTION INTRAMUSCULAR; INTRAVENOUS; SUBCUTANEOUS at 22:22

## 2018-07-27 RX ADMIN — Medication 1 APPLICATION(S): at 22:29

## 2018-07-27 RX ADMIN — GABAPENTIN 300 MILLIGRAM(S): 400 CAPSULE ORAL at 15:16

## 2018-07-27 RX ADMIN — Medication 1 APPLICATION(S): at 11:18

## 2018-07-27 RX ADMIN — Medication 1 TABLET(S): at 12:08

## 2018-07-27 RX ADMIN — Medication 1 APPLICATION(S): at 17:12

## 2018-07-27 RX ADMIN — HYDROMORPHONE HYDROCHLORIDE 0.5 MILLIGRAM(S): 2 INJECTION INTRAMUSCULAR; INTRAVENOUS; SUBCUTANEOUS at 12:16

## 2018-07-27 RX ADMIN — Medication 12.5 MILLIGRAM(S): at 17:12

## 2018-07-27 RX ADMIN — TRAMADOL HYDROCHLORIDE 25 MILLIGRAM(S): 50 TABLET ORAL at 18:00

## 2018-07-27 RX ADMIN — Medication 81 MILLIGRAM(S): at 12:08

## 2018-07-27 RX ADMIN — Medication 2 TABLET(S): at 12:08

## 2018-07-27 RX ADMIN — TIOTROPIUM BROMIDE 1 CAPSULE(S): 18 CAPSULE ORAL; RESPIRATORY (INHALATION) at 12:08

## 2018-07-27 RX ADMIN — MINOCYCLINE HYDROCHLORIDE 100 MILLIGRAM(S): 45 TABLET, EXTENDED RELEASE ORAL at 17:13

## 2018-07-27 RX ADMIN — BUDESONIDE AND FORMOTEROL FUMARATE DIHYDRATE 2 PUFF(S): 160; 4.5 AEROSOL RESPIRATORY (INHALATION) at 17:14

## 2018-07-27 RX ADMIN — Medication 500 MILLIGRAM(S): at 12:08

## 2018-07-27 RX ADMIN — GABAPENTIN 300 MILLIGRAM(S): 400 CAPSULE ORAL at 22:28

## 2018-07-27 RX ADMIN — GABAPENTIN 300 MILLIGRAM(S): 400 CAPSULE ORAL at 06:41

## 2018-07-27 RX ADMIN — BUDESONIDE AND FORMOTEROL FUMARATE DIHYDRATE 2 PUFF(S): 160; 4.5 AEROSOL RESPIRATORY (INHALATION) at 06:42

## 2018-07-27 RX ADMIN — SIMVASTATIN 20 MILLIGRAM(S): 20 TABLET, FILM COATED ORAL at 22:28

## 2018-07-27 RX ADMIN — Medication 2 TABLET(S): at 22:28

## 2018-07-27 RX ADMIN — Medication 50 MILLIGRAM(S): at 12:08

## 2018-07-27 RX ADMIN — PANTOPRAZOLE SODIUM 40 MILLIGRAM(S): 20 TABLET, DELAYED RELEASE ORAL at 06:41

## 2018-07-27 RX ADMIN — POLYETHYLENE GLYCOL 3350 17 GRAM(S): 17 POWDER, FOR SOLUTION ORAL at 17:14

## 2018-07-27 RX ADMIN — MINOCYCLINE HYDROCHLORIDE 100 MILLIGRAM(S): 45 TABLET, EXTENDED RELEASE ORAL at 06:41

## 2018-07-27 RX ADMIN — Medication 1 MILLIGRAM(S): at 12:09

## 2018-07-27 RX ADMIN — Medication 12.5 MILLIGRAM(S): at 06:41

## 2018-07-27 RX ADMIN — Medication 1 APPLICATION(S): at 06:42

## 2018-07-27 RX ADMIN — HYDROMORPHONE HYDROCHLORIDE 0.5 MILLIGRAM(S): 2 INJECTION INTRAMUSCULAR; INTRAVENOUS; SUBCUTANEOUS at 22:37

## 2018-07-27 RX ADMIN — LORATADINE 10 MILLIGRAM(S): 10 TABLET ORAL at 12:09

## 2018-07-27 RX ADMIN — Medication 3 MILLIGRAM(S): at 22:28

## 2018-07-27 RX ADMIN — Medication 2 TABLET(S): at 06:41

## 2018-07-27 NOTE — PROGRESS NOTE ADULT - SUBJECTIVE AND OBJECTIVE BOX
CC: f/u for MRSA knee infection and multiple wounds    Patient remains comfortable, arouses easily; Derm and Vasc wound assessments noted- improving    REVIEW OF SYSTEMS:  All other review of systems negative (Comprehensive ROS)    Antimicrobials:  long term minocycline 100 milliGRAM(s) Oral two times a day  Medications Reviewed    Vital Signs Last 24 Hrs  T(F): 98.3 (27 Jul 2018 15:49), Max: 98.3 (27 Jul 2018 15:49)  HR: 60 (27 Jul 2018 15:49) (60 - 67)  BP: 132/76 (27 Jul 2018 15:49) (117/76 - 154/77)  BP(mean): --  RR: 16 (27 Jul 2018 15:49) (16 - 18)  SpO2: 96% (27 Jul 2018 15:49) (95% - 99%)    PHYSICAL EXAM:  General: alert, no acute distress  Eyes:  anicteric, no conjunctival injection, no discharge  Oropharynx: no lesions or injection 	  Neck: supple, without adenopathy  Lungs: clear to auscultation  Heart: regular rate and rhythm; no murmur, rubs or gallops  Abdomen: soft, nondistended, nontender, wound vac in place  Skin: multiple lesions as described   Extremities:  dependent edema; R knee VAC- overall smaller wound  Neurologic: alert, oriented, moves all extremities    LAB RESULTS:  No new data	    MICROBIOLOGY:  RECENT CULTURES:  Culture - Fungal, Other (07.20.18 @ 17:13)    Specimen Source: .Other Other, hip ulcer    Culture Results:   Testing in progress    Culture - Acid Fast - Other w/Smear (07.20.18 @ 17:13)    Specimen Source: .Other Other, right hip ulcer    Acid Fast Bacilli Smear:   No acid fast bacilli seen by fluorochrome stain      RADIOLOGY REVIEWED:  Xray Chest 1 View- PORTABLE-Routine (07.13.18 @ 16:39) >  No focal consolidation.

## 2018-07-27 NOTE — PROGRESS NOTE ADULT - ASSESSMENT
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 Pt completed 10 days meropenem.     # Abd wound, multiple bl thigh wounds- now seems to be improving on steroids started >1wk  likely pyoderma gangrenosum vs panniculitis  ID-swab for afb and fungal cx -ve so far  (sp punch biopsy by derm 7/11, positive for urticaria, not consistent with PG  prior biopsies were not consistent with infection/vasculitis/malignancy   pt continued to develop new ulcers, pathergic response to biopsy sites  tissue culture grew coag neg staph likely colonized  IAs multiple biopsies have not been positive for PG,)  continue vac to abdomen, rt Knee, rt lat thigh  appreciate wound care recs, daily dressing changes, wound vac changes every other day    ID, plastics, wound care team on the case. , derm     Heme/onc: the questions of possible coumadin induced necrosis.   heme c/s noted-Coumadin necrosis unlikely given coumadin necrosis happen acutely with coumadin use.   okay to continue with coumadin. monitor INR.     # Pain control   cont dilaudid 2mg q4 PRN for severe pain  try IV tylenol 1g qd PRN for moderate pain  Try to avoid IV dilaudid, but okay to give 0.5mg IV before wound changes if necessary  can cont this in efforts to try to avoid dilaudid  gabapentin 300mg tid   Xanax 0.25 prior to dressing change  bowel regimen    # polymyalgia rheumatica  no signs of active rheum dz, evaluated by rheum 6/29    # TKR wound-mrsa  chronic minocycline suppression    # chr Anemia  stable, monitor h/h closely  appreciate heme recs    # S/P TAVR (transcatheter aortic valve replacement)  daily coumadin dosing, daily INR    # adjustment disorder with depressed mood  xanax prn    d/w Derm: wounds have been improving on steroids so will cont steroids for now.  Plan of care dw patient and  at length at bedside  d/w  at bedside at length plan of care- agreed to work on dc plan to [facility]- agreed to go to [facility] only  d/w  for possible dc Monday

## 2018-07-27 NOTE — PROGRESS NOTE ADULT - ASSESSMENT
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 with recent admission from (3/9/2018 to 6/6/2018) for TKR c/b joint infection s/p explantation with multiple wounds from rehab for evaluation of fever, lethargy, and nausea at rehab found to be febrile here with evidence of appendicitis on CT abdomen, non-operative candidate, now resolved      Multiple wounds Abdomen Bilateral thighs -no gross signs of infection    VAC to RLQ abdominal wound (white foam) and & Rt knee (black foam)  Rt anterior thigh- Breanne dressing  Rt lateral thigh- Dakins  Multiple smaller wounds- Aquacel Ag dressings- dressing order placed    Rt knee w/ skin dehiscence - per plastics and ortho  F/u Derm/ Rheum/ Hem  Abx per Medicine/ ID  Gen SUrg/ Plastics/ Ortho consults appreciated  con't offloading  con't Nuturition  con't Pericare  Con't per Medicine  F/u as outpatient in Wound [facility] [address] [facility] [phone]  d/w Medicine & Plastics team & d/w attending  Janki Cramer PA-C [id number]  I spent  35 minutes w/ this pt of which more than 50% of the time was spent counseling & coordinating care of this pt.

## 2018-07-27 NOTE — PROGRESS NOTE ADULT - SUBJECTIVE AND OBJECTIVE BOX
SUBJECTIVE: Pt seen, chart reviewed.    Pt's  & HHA at bedside.  Pt was premedicated.   Improving pain-- increased neurontin helpful    Pt seen w/Plastics  & Derm  Pt overall improving otherwise- dramatic improvement noted since restart of steroids  Derm ok'ed restarting steroids 2/2 last path bx results, suggestive but not definitive for PG but still no definitive dx for ulcers.    f/u w/ rheum/ derm/ maybe hem/onc  spontaneous necrotic hemorraghic wounds w/ fat necrosis    Pt wants to go home- and walk-  partial wgt bearing RLE-  Discussed again w/ pt &  importance of getting oob to chair & doing daily exercises & not refusing PT    No odor, redness, warmth, f/c/s noted  drainage managed by dressings	    ROS skin / msk see HPI   all other systems negative      aspirin enteric coated 81 milliGRAM(s) Oral daily  minocycline 100 milliGRAM(s) Oral two times a day      Physical Exam:  Vital Signs Last 24 Hrs  T(C): 36.8 (27 Jul 2018 15:49), Max: 36.8 (27 Jul 2018 15:49)  T(F): 98.3 (27 Jul 2018 15:49), Max: 98.3 (27 Jul 2018 15:49)  HR: 60 (27 Jul 2018 15:49) (60 - 67)  BP: 132/76 (27 Jul 2018 15:49) (117/76 - 154/77)  BP(mean): --  RR: 16 (27 Jul 2018 15:49) (16 - 18)  SpO2: 96% (27 Jul 2018 15:49) (95% - 99%)  General Appearance:      NAD /  A&Ox3  MO/ frail/ Disheveled   Envella    Musculoskeletal/Vascular:  BLE edema  BLE equally warm no acute ischemia noted  no deformities/ contractures  Rt knee wound per plastics    Skin:  dry, frail,  ecchymosis w/o hematoma    pt is tender when just gently touching skin- therefore pt was premedicated prior to assessment    Multiple wounds- see measurements in A&I sections- placed by wound PT    RLQ abdomen wound w/ moist & granular tissue no odor, kimber- nearly healed- white foam     Rt anterior thigh wound w/ moist & granular tissue - kimber- nearly healed  Rt superior posterolateral w/ new upper thigh 2 small wounds opened w/ serosanguinous drainage w/ increased granular tissue w/ less fibrinous exudate & slough  Rt inferior posterolateral wound   increased moist & granular tissue w/ necrotic tissue medially stable  -  (+) serosanguinous drainage & tender w/o odor  No erythema, increased warmth, induration, fluctuance    Lt upper lateral thigh wound   full thickness ulcers w/ moist & granular tissue   (+)serosanguinous  drainage  (+)tender w/o malodor  No erythema, increased warmth, induration, fluctuance    Lt lateral posterior thigh (inferior) superficial w/ 2 small partial thickness- healed    Lt  Upper medial inner thigh   2 wounds/ 2 full thickness - 3rd healed  moist &granular w/ fibrinous exudate  (+)serosanguinous drainage  (+)tender w/o odor  No erythema, increased warmth, induration, fluctuance          LABS:    PT/INR - ( 27 Jul 2018 09:02 )   PT: 40.7 sec;   INR: 3.51 ratio

## 2018-07-27 NOTE — PROGRESS NOTE ADULT - SUBJECTIVE AND OBJECTIVE BOX
INTERVAL HPI/OVERNIGHT EVENTS:  Ulcers improving. Continues on pred 50 QD. Primary team making plans for discharge.    MEDICATIONS  (STANDING):  ascorbic acid 500 milliGRAM(s) Oral daily  aspirin enteric coated 81 milliGRAM(s) Oral daily  betamethasone valerate 0.1% Ointment 1 Application(s) Topical two times a day  buDESOnide 160 MICROgram(s)/formoterol 4.5 MICROgram(s) Inhaler 2 Puff(s) Inhalation two times a day  calcium carbonate  625 mG + Vitamin D (OsCal 250 + D) 2 Tablet(s) Oral three times a day  clobetasol 0.05% Ointment 1 Application(s) Topical two times a day  Dakins Solution - 1/4 Strength 1 Application(s) Topical two times a day  folic acid 1 milliGRAM(s) Oral daily  gabapentin 300 milliGRAM(s) Oral three times a day  loratadine 10 milliGRAM(s) Oral daily  melatonin 3 milliGRAM(s) Oral at bedtime  metoprolol tartrate 12.5 milliGRAM(s) Oral two times a day  minocycline 100 milliGRAM(s) Oral two times a day  multivitamin 1 Tablet(s) Oral daily  pantoprazole    Tablet 40 milliGRAM(s) Oral before breakfast  polyethylene glycol 3350 17 Gram(s) Oral two times a day  predniSONE   Tablet 50 milliGRAM(s) Oral daily  simvastatin 20 milliGRAM(s) Oral at bedtime  tiotropium 18 MICROgram(s) Capsule 1 Capsule(s) Inhalation daily    MEDICATIONS  (PRN):  acetaminophen   Tablet. 650 milliGRAM(s) Oral every 6 hours PRN Mild Pain (1 - 3)  bisacodyl Suppository 10 milliGRAM(s) Rectal daily PRN Constipation  bismuth subsalicylate Liquid 30 milliLiter(s) Oral every 6 hours PRN Cramping/abd pain  HYDROmorphone  Injectable 0.5 milliGRAM(s) IV Push every 12 hours PRN please give before wound changes  magnesium hydroxide Suspension 30 milliLiter(s) Oral daily PRN Constipation  senna 2 Tablet(s) Oral at bedtime PRN Constipation  simethicone 80 milliGRAM(s) Chew two times a day PRN Gas      Allergies    amoxicillin (Other)    Intolerances    IV Contrast (Flushing (Skin); Nausea)      REVIEW OF SYSTEMS      General: no fevers/chills, no NS	    Skin: see HPI  	  Ophthalmologic: no eye pain or change in vision    Genitourinary: no dysuria or hematuria    Musculoskeletal: no joint pains or weakness	    Neurological:no weakness or tingling          Vital Signs Last 24 Hrs  T(C): 36.5 (27 Jul 2018 06:39), Max: 36.7 (26 Jul 2018 16:09)  T(F): 97.7 (27 Jul 2018 06:39), Max: 98.1 (26 Jul 2018 22:11)  HR: 62 (27 Jul 2018 06:39) (62 - 67)  BP: 154/77 (27 Jul 2018 06:39) (117/76 - 154/77)  BP(mean): --  RR: 18 (27 Jul 2018 06:39) (18 - 18)  SpO2: 99% (27 Jul 2018 06:39) (95% - 99%)    PHYSICAL EXAM:   The patient was alert and oriented X 3, well nourished, and in no  apparent distress.  There was no visible lymphadenopathy.  Conjunctiva were non injected  There was no clubbing or edema of extremities.  There was no hyperhidrosis or bromhidrosis.    Of note on skin exam:   R lateral thigh with 8 cm linear, deep, well circumscribed ulcer with superimposed eschar and two 2 x 2 cm, well circumscribed, full thickness ulcer with fibrinous base and surrounding erythema    L medial thigh: two 2 x 2 cm, sharply demarcated ulcers with fibrinous base and surrounding erythema. Prior ulcer (site of biopsy) improving.     L lateral thigh: 2 cm oval ulcer, well-demarcated (improving)    Wound vac on lower abdomen, R thigh      LABS:          PT/INR - ( 27 Jul 2018 09:02 )   PT: 40.7 sec;   INR: 3.51 ratio               RADIOLOGY & ADDITIONAL TESTS:

## 2018-07-27 NOTE — PROGRESS NOTE ADULT - ASSESSMENT
Morbid Obesity  PMR  Failed RT TKA with initial strep infection followed by MRSA infected spacer  Spontaneous areas of fat necrosis, pyoderma gangrenosum vs. panniculitis as per Derm, improving with steroids  Specimens fungal/AFB negative thus far  remains more alert, afebrile    Suggest:  Continue Minocycline for R knee spacer MRSA infection  Steroid trial as outlined  Local wound care, VAC  D/w aide at bedside

## 2018-07-27 NOTE — PROGRESS NOTE ADULT - ASSESSMENT
76 yo F with a PMH of CAD s/p HERBERT, AS s/p TAVR, bradycardia s/p PM, MVR, DVT/PE, and s/p TKR who has recurrent, deep skin ulcers of unknown etiology despite multiple skin biopsies and tissue cultures.    # Skin ulcers on the lower abdomen and lower extremities. Differential diagnosis includes pyoderma gangrenosum vs. panniculitis. Unlikely to be urticaria with excoriation vs. atypical mycobacteria  - s/p numerous skin biopsies. The last three biopsies have shown findings consistent with urticaria.  - No sign of cutaneous infection on prior biopsies.  - Patient was cleared by infectious disease for prednisone use. Pyoderma gangrenosum remains on the differential diagnosis. Continue empiric Prednisone 50 mg QD until outpatient follow-up. Given significant improvement and comorbidities, would not change to steroid-sparing agent at this time.   - Continue Clobetasol .05% ointment to wound edges BID, under occlusion of bandage. Use for two weeks.  - continue wound care as per wound service and PRS    Derm will continue to follow. Please call [phone] with questions.

## 2018-07-27 NOTE — PROGRESS NOTE ADULT - SUBJECTIVE AND OBJECTIVE BOX
Plastic Surgery Progress Note (pg [facility]: [id number], [facility]: [phone])    SUBJECTIVE:  The patient was seen and examined. No acute events overnight.    OBJECTIVE:     ** VITAL SIGNS / I&O's **    Vital Signs Last 24 Hrs  T(C): 36.4 (27 Jul 2018 11:15), Max: 36.7 (26 Jul 2018 16:09)  T(F): 97.6 (27 Jul 2018 11:15), Max: 98.1 (26 Jul 2018 22:11)  HR: 63 (27 Jul 2018 11:15) (62 - 67)  BP: 148/81 (27 Jul 2018 11:15) (117/76 - 154/77)  BP(mean): --  RR: 18 (27 Jul 2018 11:15) (18 - 18)  SpO2: 98% (27 Jul 2018 11:15) (95% - 99%)      26 Jul 2018 07:01  -  27 Jul 2018 07:00  --------------------------------------------------------  IN:    Oral Fluid: 240 mL  Total IN: 240 mL    OUT:  Total OUT: 0 mL    Total NET: 240 mL          ** PHYSICAL EXAM **    -- CONSTITUTIONAL: Alert, NAD   -- ABDOMEN: central abd VAC intact and set to suction.   -- EXTREMITIES: W2D dressing on R lateral thigh, R knee vac holding suction. R medial thigh- promogran dressing in place.    ** LABS **            PT/INR - ( 27 Jul 2018 09:02 )   PT: 40.7 sec;   INR: 3.51 ratio           CAPILLARY BLOOD GLUCOSE                    ** MEDICATIONS **  MEDICATIONS  (STANDING):  ascorbic acid 500 milliGRAM(s) Oral daily  aspirin enteric coated 81 milliGRAM(s) Oral daily  betamethasone valerate 0.1% Ointment 1 Application(s) Topical two times a day  buDESOnide 160 MICROgram(s)/formoterol 4.5 MICROgram(s) Inhaler 2 Puff(s) Inhalation two times a day  calcium carbonate  625 mG + Vitamin D (OsCal 250 + D) 2 Tablet(s) Oral three times a day  clobetasol 0.05% Ointment 1 Application(s) Topical two times a day  Dakins Solution - 1/4 Strength 1 Application(s) Topical two times a day  folic acid 1 milliGRAM(s) Oral daily  gabapentin 300 milliGRAM(s) Oral three times a day  loratadine 10 milliGRAM(s) Oral daily  melatonin 3 milliGRAM(s) Oral at bedtime  metoprolol tartrate 12.5 milliGRAM(s) Oral two times a day  minocycline 100 milliGRAM(s) Oral two times a day  multivitamin 1 Tablet(s) Oral daily  pantoprazole    Tablet 40 milliGRAM(s) Oral before breakfast  polyethylene glycol 3350 17 Gram(s) Oral two times a day  predniSONE   Tablet 50 milliGRAM(s) Oral daily  simvastatin 20 milliGRAM(s) Oral at bedtime  tiotropium 18 MICROgram(s) Capsule 1 Capsule(s) Inhalation daily    MEDICATIONS  (PRN):  acetaminophen   Tablet. 650 milliGRAM(s) Oral every 6 hours PRN Mild Pain (1 - 3)  bisacodyl Suppository 10 milliGRAM(s) Rectal daily PRN Constipation  bismuth subsalicylate Liquid 30 milliLiter(s) Oral every 6 hours PRN Cramping/abd pain  HYDROmorphone  Injectable 0.5 milliGRAM(s) IV Push every 12 hours PRN please give before wound changes  magnesium hydroxide Suspension 30 milliLiter(s) Oral daily PRN Constipation  senna 2 Tablet(s) Oral at bedtime PRN Constipation  simethicone 80 milliGRAM(s) Chew two times a day PRN Gas

## 2018-07-27 NOTE — PROGRESS NOTE ADULT - ASSESSMENT
A/P: s/p Right total knee insertion of spacer, irrigation and debridement, complex wound closure 3/23; readmitted 6/8 for appendicitis    - Management per primary team  - cont vac/dressing changes to R knee, thigh, and abdomen M/W/F  - remainder of wounds per wound care/derm  - ID following  - ok for discharge from [facility] perspective    Plastic Surgery (pg [facility]: [id number], [facility]: [phone])

## 2018-07-28 LAB
ANION GAP SERPL CALC-SCNC: 11 MMOL/L — SIGNIFICANT CHANGE UP (ref 5–17)
APTT BLD: 38.9 SEC — HIGH (ref 27.5–37.4)
BUN SERPL-MCNC: 39 MG/DL — HIGH (ref 7–23)
CALCIUM SERPL-MCNC: 9.1 MG/DL — SIGNIFICANT CHANGE UP (ref 8.4–10.5)
CHLORIDE SERPL-SCNC: 99 MMOL/L — SIGNIFICANT CHANGE UP (ref 96–108)
CO2 SERPL-SCNC: 27 MMOL/L — SIGNIFICANT CHANGE UP (ref 22–31)
CREAT SERPL-MCNC: 0.67 MG/DL — SIGNIFICANT CHANGE UP (ref 0.5–1.3)
GLUCOSE SERPL-MCNC: 101 MG/DL — HIGH (ref 70–99)
HCT VFR BLD CALC: 30.9 % — LOW (ref 34.5–45)
HGB BLD-MCNC: 9.3 G/DL — LOW (ref 11.5–15.5)
INR BLD: 2.88 RATIO — HIGH (ref 0.88–1.16)
MCHC RBC-ENTMCNC: 26.1 PG — LOW (ref 27–34)
MCHC RBC-ENTMCNC: 30.1 GM/DL — LOW (ref 32–36)
MCV RBC AUTO: 86.8 FL — SIGNIFICANT CHANGE UP (ref 80–100)
PLATELET # BLD AUTO: 367 K/UL — SIGNIFICANT CHANGE UP (ref 150–400)
POTASSIUM SERPL-MCNC: 5.1 MMOL/L — SIGNIFICANT CHANGE UP (ref 3.5–5.3)
POTASSIUM SERPL-SCNC: 5.1 MMOL/L — SIGNIFICANT CHANGE UP (ref 3.5–5.3)
PROTHROM AB SERPL-ACNC: 32.1 SEC — HIGH (ref 9.8–12.7)
RBC # BLD: 3.56 M/UL — LOW (ref 3.8–5.2)
RBC # FLD: 17.2 % — HIGH (ref 10.3–14.5)
SODIUM SERPL-SCNC: 137 MMOL/L — SIGNIFICANT CHANGE UP (ref 135–145)
WBC # BLD: 12.48 K/UL — HIGH (ref 3.8–10.5)
WBC # FLD AUTO: 12.48 K/UL — HIGH (ref 3.8–10.5)

## 2018-07-28 RX ORDER — HYDROMORPHONE HYDROCHLORIDE 2 MG/ML
0.5 INJECTION INTRAMUSCULAR; INTRAVENOUS; SUBCUTANEOUS ONCE
Qty: 0 | Refills: 0 | Status: DISCONTINUED | OUTPATIENT
Start: 2018-07-28 | End: 2018-07-28

## 2018-07-28 RX ADMIN — Medication 1 APPLICATION(S): at 22:36

## 2018-07-28 RX ADMIN — Medication 1 APPLICATION(S): at 11:00

## 2018-07-28 RX ADMIN — BUDESONIDE AND FORMOTEROL FUMARATE DIHYDRATE 2 PUFF(S): 160; 4.5 AEROSOL RESPIRATORY (INHALATION) at 17:41

## 2018-07-28 RX ADMIN — Medication 81 MILLIGRAM(S): at 12:44

## 2018-07-28 RX ADMIN — Medication 2 TABLET(S): at 22:35

## 2018-07-28 RX ADMIN — HYDROMORPHONE HYDROCHLORIDE 0.5 MILLIGRAM(S): 2 INJECTION INTRAMUSCULAR; INTRAVENOUS; SUBCUTANEOUS at 17:42

## 2018-07-28 RX ADMIN — GABAPENTIN 300 MILLIGRAM(S): 400 CAPSULE ORAL at 13:55

## 2018-07-28 RX ADMIN — PANTOPRAZOLE SODIUM 40 MILLIGRAM(S): 20 TABLET, DELAYED RELEASE ORAL at 06:15

## 2018-07-28 RX ADMIN — Medication 1 APPLICATION(S): at 17:41

## 2018-07-28 RX ADMIN — BUDESONIDE AND FORMOTEROL FUMARATE DIHYDRATE 2 PUFF(S): 160; 4.5 AEROSOL RESPIRATORY (INHALATION) at 06:15

## 2018-07-28 RX ADMIN — Medication 3 MILLIGRAM(S): at 22:35

## 2018-07-28 RX ADMIN — Medication 1 TABLET(S): at 12:45

## 2018-07-28 RX ADMIN — Medication 500 MILLIGRAM(S): at 12:45

## 2018-07-28 RX ADMIN — LORATADINE 10 MILLIGRAM(S): 10 TABLET ORAL at 12:45

## 2018-07-28 RX ADMIN — POLYETHYLENE GLYCOL 3350 17 GRAM(S): 17 POWDER, FOR SOLUTION ORAL at 06:16

## 2018-07-28 RX ADMIN — Medication 1 APPLICATION(S): at 06:16

## 2018-07-28 RX ADMIN — Medication 2 TABLET(S): at 06:15

## 2018-07-28 RX ADMIN — MINOCYCLINE HYDROCHLORIDE 100 MILLIGRAM(S): 45 TABLET, EXTENDED RELEASE ORAL at 06:15

## 2018-07-28 RX ADMIN — Medication 2 TABLET(S): at 13:55

## 2018-07-28 RX ADMIN — GABAPENTIN 300 MILLIGRAM(S): 400 CAPSULE ORAL at 06:15

## 2018-07-28 RX ADMIN — Medication 12.5 MILLIGRAM(S): at 06:15

## 2018-07-28 RX ADMIN — Medication 50 MILLIGRAM(S): at 12:46

## 2018-07-28 RX ADMIN — HYDROMORPHONE HYDROCHLORIDE 0.5 MILLIGRAM(S): 2 INJECTION INTRAMUSCULAR; INTRAVENOUS; SUBCUTANEOUS at 11:10

## 2018-07-28 RX ADMIN — MINOCYCLINE HYDROCHLORIDE 100 MILLIGRAM(S): 45 TABLET, EXTENDED RELEASE ORAL at 17:43

## 2018-07-28 RX ADMIN — SIMVASTATIN 20 MILLIGRAM(S): 20 TABLET, FILM COATED ORAL at 22:35

## 2018-07-28 RX ADMIN — TIOTROPIUM BROMIDE 1 CAPSULE(S): 18 CAPSULE ORAL; RESPIRATORY (INHALATION) at 12:45

## 2018-07-28 RX ADMIN — GABAPENTIN 300 MILLIGRAM(S): 400 CAPSULE ORAL at 22:35

## 2018-07-28 RX ADMIN — Medication 12.5 MILLIGRAM(S): at 17:42

## 2018-07-28 RX ADMIN — Medication 1 MILLIGRAM(S): at 12:45

## 2018-07-28 RX ADMIN — HYDROMORPHONE HYDROCHLORIDE 0.5 MILLIGRAM(S): 2 INJECTION INTRAMUSCULAR; INTRAVENOUS; SUBCUTANEOUS at 22:31

## 2018-07-28 NOTE — PROGRESS NOTE ADULT - ASSESSMENT
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 Pt completed 10 days meropenem.     # Abd wound, multiple bl thigh wounds- now seems to be improving on steroids started >1wk  likely pyoderma gangrenosum vs panniculitis  ID-swab for afb and fungal cx -ve so far  (sp punch biopsy by derm 7/11, positive for urticaria, not consistent with PG  prior biopsies were not consistent with infection/vasculitis/malignancy   pt continued to develop new ulcers, pathergic response to biopsy sites  tissue culture grew coag neg staph likely colonized  IAs multiple biopsies have not been positive for PG,)  continue vac to abdomen, rt Knee, rt lat thigh  appreciate wound care recs, daily dressing changes, wound vac changes every other day    ID, plastics, wound care team on the case. , derm     Heme/onc: the questions of possible coumadin induced necrosis.   heme c/s noted-Coumadin necrosis unlikely given coumadin necrosis happen acutely with coumadin use.   okay to continue with coumadin. monitor INR.     # Pain control   cont dilaudid 2mg q4 PRN for severe pain  try IV tylenol 1g qd PRN for moderate pain  Try to avoid IV dilaudid, but okay to give 0.5mg IV before wound changes if necessary  can cont this in efforts to try to avoid dilaudid  gabapentin 300mg tid   Xanax 0.25 prior to dressing change  bowel regimen    # polymyalgia rheumatica  no signs of active rheum dz, evaluated by rheum 6/29    # TKR wound-mrsa  chronic minocycline suppression    # chr Anemia  stable, monitor h/h closely  appreciate heme recs    # S/P TAVR (transcatheter aortic valve replacement)  daily coumadin dosing, daily INR    # adjustment disorder with depressed mood  xanax prn    d/w Derm: wounds have been improving on steroids so will cont steroids for now.  Plan of care dw patient and  at length at bedside   agreed to work on dc plan to [facility]- willing to go to [facility] only  d/w cm for possible dc Monday

## 2018-07-28 NOTE — PROGRESS NOTE ADULT - SUBJECTIVE AND OBJECTIVE BOX
Patient is a 77y old  Female who presents with a chief complaint of fever, lethargy (07 Jun 2018 22:19)      INTERVAL HPI/OVERNIGHT EVENTS: noted, feels well      Vital Signs Last 24 Hrs  T(C): 36.3 (28 Jul 2018 10:25), Max: 36.8 (27 Jul 2018 15:49)  T(F): 97.4 (28 Jul 2018 10:25), Max: 98.3 (27 Jul 2018 15:49)  HR: 60 (28 Jul 2018 10:25) (60 - 63)  BP: 165/78 (28 Jul 2018 10:25) (132/76 - 165/78)  BP(mean): --  RR: 18 (28 Jul 2018 10:25) (16 - 18)  SpO2: 99% (28 Jul 2018 10:25) (96% - 99%)    acetaminophen   Tablet. 650 milliGRAM(s) Oral every 6 hours PRN  ascorbic acid 500 milliGRAM(s) Oral daily  aspirin enteric coated 81 milliGRAM(s) Oral daily  betamethasone valerate 0.1% Ointment 1 Application(s) Topical two times a day  bisacodyl Suppository 10 milliGRAM(s) Rectal daily PRN  bismuth subsalicylate Liquid 30 milliLiter(s) Oral every 6 hours PRN  buDESOnide 160 MICROgram(s)/formoterol 4.5 MICROgram(s) Inhaler 2 Puff(s) Inhalation two times a day  calcium carbonate  625 mG + Vitamin D (OsCal 250 + D) 2 Tablet(s) Oral three times a day  clobetasol 0.05% Ointment 1 Application(s) Topical two times a day  Dakins Solution - 1/4 Strength 1 Application(s) Topical two times a day  folic acid 1 milliGRAM(s) Oral daily  gabapentin 300 milliGRAM(s) Oral three times a day  HYDROmorphone  Injectable 0.5 milliGRAM(s) IV Push every 12 hours PRN  loratadine 10 milliGRAM(s) Oral daily  magnesium hydroxide Suspension 30 milliLiter(s) Oral daily PRN  melatonin 3 milliGRAM(s) Oral at bedtime  metoprolol tartrate 12.5 milliGRAM(s) Oral two times a day  minocycline 100 milliGRAM(s) Oral two times a day  multivitamin 1 Tablet(s) Oral daily  pantoprazole    Tablet 40 milliGRAM(s) Oral before breakfast  polyethylene glycol 3350 17 Gram(s) Oral two times a day  predniSONE   Tablet 50 milliGRAM(s) Oral daily  senna 2 Tablet(s) Oral at bedtime PRN  simethicone 80 milliGRAM(s) Chew two times a day PRN  simvastatin 20 milliGRAM(s) Oral at bedtime  tiotropium 18 MICROgram(s) Capsule 1 Capsule(s) Inhalation daily      PHYSICAL EXAM:  GENERAL: NAD,   EYES: conjunctiva and sclera clear  ENMT: Moist mucous membranes  NECK: Supple, No JVD, Normal thyroid  NERVOUS SYSTEM:  Alert & Oriented X3,   CHEST/LUNG: Clear to auscultation bilaterally; No rales, rhonchi, wheezing, or rubs  HEART: Regular rate and rhythm; No murmurs, rubs, or gallops  ABDOMEN: Soft, Nontender, Nondistended; Bowel sounds present  EXTREMITIES:    )Lower abd wound -healing well  2)rt knee-vac wound improved healing  3)rt medial thigh wound- margins indurated, healing slowly  4)Rt lateral thigh/buttock wound-large deep-necrotic tissue-packing   5)rt lateral thigh 2 small holes-wound packing done  6) Lt medial thigh '2 small deep ulcers-wound packing  7)Lt lateral /post thigh: 2-3 small punched out ulcers-wound packing   LYMPH: No lymphadenopathy noted  SKIN: No rashes or lesions    Consultant(s) Notes Reviewed:  [x ] YES  [ ] NO  Care Discussed with Consultants/Other Providers [ x] YES  [ ] NO    LABS:                        9.3    12.48 )-----------( 367      ( 28 Jul 2018 10:40 )             30.9     07-28    137  |  99  |  39<H>  ----------------------------<  101<H>  5.1   |  27  |  0.67    Ca    9.1      28 Jul 2018 07:41      PT/INR - ( 28 Jul 2018 07:41 )   PT: 32.1 sec;   INR: 2.88 ratio         PTT - ( 28 Jul 2018 07:41 )  PTT:38.9 sec    CAPILLARY BLOOD GLUCOSE                RADIOLOGY & ADDITIONAL TESTS:    Imaging Personally Reviewed:  [ ] YES  [ ] NO

## 2018-07-28 NOTE — PROGRESS NOTE ADULT - ASSESSMENT
Morbid Obesity  PMR  Failed RT TKA with initial strep infection followed by MRSA infected spacer  Spontaneous areas of fat necrosis, pyoderma gangrenosum vs. panniculitis as per Derm, improving with steroids  Specimens fungal/AFB negative thus far  Remains more alert, afebrile  Mild leukocytosis baseline, steroid effect    Suggest:  Continue Minocycline for R knee spacer MRSA infection  Steroid trial as outlined  Local wound care, VAC  D/w aide at bedside

## 2018-07-28 NOTE — PROGRESS NOTE ADULT - SUBJECTIVE AND OBJECTIVE BOX
CC: f/u for MRSA knee infection and multiple wounds    Patient remains alert; denies pain    REVIEW OF SYSTEMS:  All other review of systems negative (Comprehensive ROS)    Antimicrobials:  long term minocycline 100 milliGRAM(s) Oral two times a day  Medications Reviewed    Vital Signs Last 24 Hrs  T(F): 97.4 (28 Jul 2018 10:25), Max: 98.3 (27 Jul 2018 15:49)  HR: 60 (28 Jul 2018 10:25) (60 - 63)  BP: 165/78 (28 Jul 2018 10:25) (132/76 - 165/78)  BP(mean): --  RR: 18 (28 Jul 2018 10:25) (16 - 18)  SpO2: 99% (28 Jul 2018 10:25) (96% - 99%)    PHYSICAL EXAM:  General: alert, no acute distress  Eyes:  anicteric, no conjunctival injection, no discharge  Oropharynx: no lesions or injection 	  Neck: supple, without adenopathy  Lungs: clear to auscultation  Heart: regular rate and rhythm; no murmur, rubs or gallops  Abdomen: soft, nondistended, nontender, wound vac in place  Skin: multiple lesions as described   Extremities:  dependent edema; R knee VAC- overall smaller wound  Neurologic: alert, moves all extremities    LAB RESULTS:                      9.3    12.48 )-----------( 367      ( 28 Jul 2018 10:40 )             30.9   07-28    137  |  99  |  39<H>  ----------------------------<  101<H>  5.1   |  27  |  0.67    Ca    9.1      28 Jul 2018 07:41      MICROBIOLOGY:  RECENT CULTURES:  Culture - Fungal, Other (07.20.18 @ 17:13)    Specimen Source: .Other Other, hip ulcer    Culture Results:   Testing in progress    Culture - Acid Fast - Other w/Smear (07.20.18 @ 17:13)    Specimen Source: .Other Other, right hip ulcer    Acid Fast Bacilli Smear:   No acid fast bacilli seen by fluorochrome stain      RADIOLOGY REVIEWED:  Xray Chest 1 View- PORTABLE-Routine (07.13.18 @ 16:39) >  No focal consolidation.

## 2018-07-29 LAB
INR BLD: 2.24 RATIO — HIGH (ref 0.88–1.16)
PROTHROM AB SERPL-ACNC: 24.8 SEC — HIGH (ref 9.8–12.7)

## 2018-07-29 RX ORDER — TRAMADOL HYDROCHLORIDE 50 MG/1
25 TABLET ORAL ONCE
Qty: 0 | Refills: 0 | Status: DISCONTINUED | OUTPATIENT
Start: 2018-07-29 | End: 2018-07-29

## 2018-07-29 RX ORDER — WARFARIN SODIUM 2.5 MG/1
2 TABLET ORAL ONCE
Qty: 0 | Refills: 0 | Status: COMPLETED | OUTPATIENT
Start: 2018-07-29 | End: 2018-07-29

## 2018-07-29 RX ADMIN — SIMVASTATIN 20 MILLIGRAM(S): 20 TABLET, FILM COATED ORAL at 22:12

## 2018-07-29 RX ADMIN — GABAPENTIN 300 MILLIGRAM(S): 400 CAPSULE ORAL at 13:23

## 2018-07-29 RX ADMIN — Medication 1 APPLICATION(S): at 06:23

## 2018-07-29 RX ADMIN — Medication 1 APPLICATION(S): at 18:16

## 2018-07-29 RX ADMIN — TIOTROPIUM BROMIDE 1 CAPSULE(S): 18 CAPSULE ORAL; RESPIRATORY (INHALATION) at 13:25

## 2018-07-29 RX ADMIN — MINOCYCLINE HYDROCHLORIDE 100 MILLIGRAM(S): 45 TABLET, EXTENDED RELEASE ORAL at 18:16

## 2018-07-29 RX ADMIN — Medication 50 MILLIGRAM(S): at 13:24

## 2018-07-29 RX ADMIN — Medication 1 TABLET(S): at 13:24

## 2018-07-29 RX ADMIN — Medication 12.5 MILLIGRAM(S): at 18:15

## 2018-07-29 RX ADMIN — Medication 2 TABLET(S): at 13:23

## 2018-07-29 RX ADMIN — TRAMADOL HYDROCHLORIDE 25 MILLIGRAM(S): 50 TABLET ORAL at 18:58

## 2018-07-29 RX ADMIN — BUDESONIDE AND FORMOTEROL FUMARATE DIHYDRATE 2 PUFF(S): 160; 4.5 AEROSOL RESPIRATORY (INHALATION) at 18:16

## 2018-07-29 RX ADMIN — Medication 1 MILLIGRAM(S): at 13:23

## 2018-07-29 RX ADMIN — GABAPENTIN 300 MILLIGRAM(S): 400 CAPSULE ORAL at 06:22

## 2018-07-29 RX ADMIN — Medication 1 APPLICATION(S): at 10:07

## 2018-07-29 RX ADMIN — WARFARIN SODIUM 2 MILLIGRAM(S): 2.5 TABLET ORAL at 22:11

## 2018-07-29 RX ADMIN — Medication 1 APPLICATION(S): at 22:11

## 2018-07-29 RX ADMIN — MINOCYCLINE HYDROCHLORIDE 100 MILLIGRAM(S): 45 TABLET, EXTENDED RELEASE ORAL at 06:22

## 2018-07-29 RX ADMIN — HYDROMORPHONE HYDROCHLORIDE 0.5 MILLIGRAM(S): 2 INJECTION INTRAMUSCULAR; INTRAVENOUS; SUBCUTANEOUS at 22:09

## 2018-07-29 RX ADMIN — TRAMADOL HYDROCHLORIDE 25 MILLIGRAM(S): 50 TABLET ORAL at 18:40

## 2018-07-29 RX ADMIN — Medication 12.5 MILLIGRAM(S): at 06:22

## 2018-07-29 RX ADMIN — GABAPENTIN 300 MILLIGRAM(S): 400 CAPSULE ORAL at 22:11

## 2018-07-29 RX ADMIN — LORATADINE 10 MILLIGRAM(S): 10 TABLET ORAL at 13:25

## 2018-07-29 RX ADMIN — Medication 2 TABLET(S): at 22:11

## 2018-07-29 RX ADMIN — PANTOPRAZOLE SODIUM 40 MILLIGRAM(S): 20 TABLET, DELAYED RELEASE ORAL at 06:22

## 2018-07-29 RX ADMIN — Medication 3 MILLIGRAM(S): at 22:10

## 2018-07-29 RX ADMIN — Medication 650 MILLIGRAM(S): at 14:43

## 2018-07-29 RX ADMIN — Medication 650 MILLIGRAM(S): at 15:00

## 2018-07-29 RX ADMIN — Medication 2 TABLET(S): at 06:22

## 2018-07-29 RX ADMIN — HYDROMORPHONE HYDROCHLORIDE 0.5 MILLIGRAM(S): 2 INJECTION INTRAMUSCULAR; INTRAVENOUS; SUBCUTANEOUS at 09:19

## 2018-07-29 RX ADMIN — BUDESONIDE AND FORMOTEROL FUMARATE DIHYDRATE 2 PUFF(S): 160; 4.5 AEROSOL RESPIRATORY (INHALATION) at 06:22

## 2018-07-29 RX ADMIN — HYDROMORPHONE HYDROCHLORIDE 0.5 MILLIGRAM(S): 2 INJECTION INTRAMUSCULAR; INTRAVENOUS; SUBCUTANEOUS at 22:25

## 2018-07-29 RX ADMIN — Medication 81 MILLIGRAM(S): at 13:23

## 2018-07-29 RX ADMIN — Medication 500 MILLIGRAM(S): at 13:23

## 2018-07-29 RX ADMIN — HYDROMORPHONE HYDROCHLORIDE 0.5 MILLIGRAM(S): 2 INJECTION INTRAMUSCULAR; INTRAVENOUS; SUBCUTANEOUS at 09:35

## 2018-07-29 NOTE — PROGRESS NOTE ADULT - SUBJECTIVE AND OBJECTIVE BOX
Patient is a 77y old  Female who presents with a chief complaint of fever, lethargy (07 Jun 2018 22:19)      INTERVAL HPI/OVERNIGHT EVENTS: noted, feels well      Vital Signs Last 24 Hrs  T(C): 36.4 (29 Jul 2018 06:28), Max: 36.7 (28 Jul 2018 17:36)  T(F): 97.6 (29 Jul 2018 06:28), Max: 98.1 (28 Jul 2018 17:36)  HR: 60 (29 Jul 2018 06:28) (60 - 79)  BP: 163/84 (29 Jul 2018 06:28) (122/76 - 163/84)  BP(mean): --  RR: 18 (29 Jul 2018 06:28) (18 - 19)  SpO2: 98% (29 Jul 2018 06:28) (97% - 98%)    acetaminophen   Tablet. 650 milliGRAM(s) Oral every 6 hours PRN  ascorbic acid 500 milliGRAM(s) Oral daily  aspirin enteric coated 81 milliGRAM(s) Oral daily  betamethasone valerate 0.1% Ointment 1 Application(s) Topical two times a day  bisacodyl Suppository 10 milliGRAM(s) Rectal daily PRN  bismuth subsalicylate Liquid 30 milliLiter(s) Oral every 6 hours PRN  buDESOnide 160 MICROgram(s)/formoterol 4.5 MICROgram(s) Inhaler 2 Puff(s) Inhalation two times a day  calcium carbonate  625 mG + Vitamin D (OsCal 250 + D) 2 Tablet(s) Oral three times a day  clobetasol 0.05% Ointment 1 Application(s) Topical two times a day  Dakins Solution - 1/4 Strength 1 Application(s) Topical two times a day  folic acid 1 milliGRAM(s) Oral daily  gabapentin 300 milliGRAM(s) Oral three times a day  HYDROmorphone  Injectable 0.5 milliGRAM(s) IV Push every 12 hours PRN  loratadine 10 milliGRAM(s) Oral daily  magnesium hydroxide Suspension 30 milliLiter(s) Oral daily PRN  melatonin 3 milliGRAM(s) Oral at bedtime  metoprolol tartrate 12.5 milliGRAM(s) Oral two times a day  minocycline 100 milliGRAM(s) Oral two times a day  multivitamin 1 Tablet(s) Oral daily  pantoprazole    Tablet 40 milliGRAM(s) Oral before breakfast  polyethylene glycol 3350 17 Gram(s) Oral two times a day  predniSONE   Tablet 50 milliGRAM(s) Oral daily  senna 2 Tablet(s) Oral at bedtime PRN  simethicone 80 milliGRAM(s) Chew two times a day PRN  simvastatin 20 milliGRAM(s) Oral at bedtime  tiotropium 18 MICROgram(s) Capsule 1 Capsule(s) Inhalation daily      PHYSICAL EXAM:  GENERAL: NAD,   EYES: conjunctiva and sclera clear  ENMT: Moist mucous membranes  NECK: Supple, No JVD, Normal thyroid  NERVOUS SYSTEM:  Alert & Oriented X3,   CHEST/LUNG: Clear to auscultation bilaterally; No rales, rhonchi, wheezing, or rubs  HEART: Regular rate and rhythm; No murmurs, rubs, or gallops  ABDOMEN: Soft, Nontender, Nondistended; Bowel sounds present  EXTREMITIES:    Lower abd wound -healing well  2)rt knee-vac wound improved healing  3)rt medial thigh wound- margins indurated, healing slowly  4)Rt lateral thigh/buttock wound-large deep-necrotic tissue-packing   5)rt lateral thigh 2 small holes-wound packing done  6) Lt medial thigh '2 small deep ulcers-wound packing  7)Lt lateral /post thigh: 2-3 small punched out ulcers-wound packing LYMPH: No lymphadenopathy noted  SKIN: No rashes or lesions    Consultant(s) Notes Reviewed:  [x ] YES  [ ] NO  Care Discussed with Consultants/Other Providers [ x] YES  [ ] NO    LABS:                        9.3    12.48 )-----------( 367      ( 28 Jul 2018 10:40 )             30.9     07-28    137  |  99  |  39<H>  ----------------------------<  101<H>  5.1   |  27  |  0.67    Ca    9.1      28 Jul 2018 07:41      PT/INR - ( 29 Jul 2018 06:54 )   PT: 24.8 sec;   INR: 2.24 ratio         PTT - ( 28 Jul 2018 07:41 )  PTT:38.9 sec    CAPILLARY BLOOD GLUCOSE                RADIOLOGY & ADDITIONAL TESTS:    Imaging Personally Reviewed:  [ ] YES  [ ] NO

## 2018-07-29 NOTE — PROGRESS NOTE ADULT - ATTENDING COMMENTS
Reina Murphy MD   [facility]

## 2018-07-29 NOTE — PROGRESS NOTE ADULT - ASSESSMENT
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 Pt completed 10 days meropenem.     # Abd wound, multiple bl thigh wounds- now seems to be improving on steroids started >1wk  likely pyoderma gangrenosum vs panniculitis  ID-swab for afb and fungal cx -ve so far  (sp punch biopsy by derm 7/11, positive for urticaria, not consistent with PG  prior biopsies were not consistent with infection/vasculitis/malignancy   pt continued to develop new ulcers, pathergic response to biopsy sites  tissue culture grew coag neg staph likely colonized  IAs multiple biopsies have not been positive for PG,)  continue vac to abdomen, rt Knee, rt lat thigh  appreciate wound care recs, daily dressing changes, wound vac changes every other day    ID, plastics, wound care team on the case. , derm     Heme/onc: the questions of possible coumadin induced necrosis.   heme c/s noted-Coumadin necrosis unlikely given coumadin necrosis happen acutely with coumadin use.   okay to continue with coumadin. monitor INR.     # Pain control   cont dilaudid 2mg q4 PRN for severe pain  try IV tylenol 1g qd PRN for moderate pain  Try to avoid IV dilaudid, but okay to give 0.5mg IV before wound changes if necessary  can cont this in efforts to try to avoid dilaudid  gabapentin 300mg tid   Xanax 0.25 prior to dressing change  bowel regimen    # polymyalgia rheumatica  no signs of active rheum dz, evaluated by rheum 6/29    # TKR wound-mrsa  chronic minocycline suppression    # chr Anemia  stable, monitor h/h closely  appreciate heme recs    # S/P TAVR (transcatheter aortic valve replacement)  daily coumadin dosing, daily INR    # adjustment disorder with depressed mood  xanax prn    d/w Derm: wounds have been improving on steroids so will cont steroids for now.  Plan of care dw patient and  at length at bedside   agreed to work on dc plan to [facility]- willing to go to [facility] only  d/w cm for possible dc Monday

## 2018-07-30 LAB
APTT BLD: 33.7 SEC — SIGNIFICANT CHANGE UP (ref 27.5–37.4)
INR BLD: 1.93 RATIO — HIGH (ref 0.88–1.16)
PROTHROM AB SERPL-ACNC: 22.1 SEC — HIGH (ref 10–13.1)

## 2018-07-30 PROCEDURE — 99232 SBSQ HOSP IP/OBS MODERATE 35: CPT

## 2018-07-30 RX ORDER — WARFARIN SODIUM 2.5 MG/1
1 TABLET ORAL ONCE
Qty: 0 | Refills: 0 | Status: COMPLETED | OUTPATIENT
Start: 2018-07-30 | End: 2018-07-30

## 2018-07-30 RX ADMIN — TIOTROPIUM BROMIDE 1 CAPSULE(S): 18 CAPSULE ORAL; RESPIRATORY (INHALATION) at 11:57

## 2018-07-30 RX ADMIN — Medication 650 MILLIGRAM(S): at 21:15

## 2018-07-30 RX ADMIN — SIMVASTATIN 20 MILLIGRAM(S): 20 TABLET, FILM COATED ORAL at 22:14

## 2018-07-30 RX ADMIN — Medication 1 APPLICATION(S): at 22:15

## 2018-07-30 RX ADMIN — LORATADINE 10 MILLIGRAM(S): 10 TABLET ORAL at 11:57

## 2018-07-30 RX ADMIN — Medication 12.5 MILLIGRAM(S): at 18:20

## 2018-07-30 RX ADMIN — BUDESONIDE AND FORMOTEROL FUMARATE DIHYDRATE 2 PUFF(S): 160; 4.5 AEROSOL RESPIRATORY (INHALATION) at 18:20

## 2018-07-30 RX ADMIN — Medication 2 TABLET(S): at 13:58

## 2018-07-30 RX ADMIN — Medication 12.5 MILLIGRAM(S): at 06:47

## 2018-07-30 RX ADMIN — GABAPENTIN 300 MILLIGRAM(S): 400 CAPSULE ORAL at 13:58

## 2018-07-30 RX ADMIN — Medication 1 MILLIGRAM(S): at 11:57

## 2018-07-30 RX ADMIN — MINOCYCLINE HYDROCHLORIDE 100 MILLIGRAM(S): 45 TABLET, EXTENDED RELEASE ORAL at 06:47

## 2018-07-30 RX ADMIN — Medication 650 MILLIGRAM(S): at 07:03

## 2018-07-30 RX ADMIN — Medication 500 MILLIGRAM(S): at 11:56

## 2018-07-30 RX ADMIN — HYDROMORPHONE HYDROCHLORIDE 0.5 MILLIGRAM(S): 2 INJECTION INTRAMUSCULAR; INTRAVENOUS; SUBCUTANEOUS at 23:00

## 2018-07-30 RX ADMIN — Medication 3 MILLIGRAM(S): at 22:15

## 2018-07-30 RX ADMIN — Medication 81 MILLIGRAM(S): at 11:56

## 2018-07-30 RX ADMIN — MINOCYCLINE HYDROCHLORIDE 100 MILLIGRAM(S): 45 TABLET, EXTENDED RELEASE ORAL at 18:20

## 2018-07-30 RX ADMIN — PANTOPRAZOLE SODIUM 40 MILLIGRAM(S): 20 TABLET, DELAYED RELEASE ORAL at 06:47

## 2018-07-30 RX ADMIN — HYDROMORPHONE HYDROCHLORIDE 0.5 MILLIGRAM(S): 2 INJECTION INTRAMUSCULAR; INTRAVENOUS; SUBCUTANEOUS at 11:04

## 2018-07-30 RX ADMIN — Medication 1 TABLET(S): at 11:56

## 2018-07-30 RX ADMIN — Medication 2 TABLET(S): at 22:15

## 2018-07-30 RX ADMIN — Medication 1 APPLICATION(S): at 06:47

## 2018-07-30 RX ADMIN — Medication 650 MILLIGRAM(S): at 07:33

## 2018-07-30 RX ADMIN — HYDROMORPHONE HYDROCHLORIDE 0.5 MILLIGRAM(S): 2 INJECTION INTRAMUSCULAR; INTRAVENOUS; SUBCUTANEOUS at 11:20

## 2018-07-30 RX ADMIN — Medication 1 APPLICATION(S): at 11:58

## 2018-07-30 RX ADMIN — Medication 650 MILLIGRAM(S): at 22:15

## 2018-07-30 RX ADMIN — GABAPENTIN 300 MILLIGRAM(S): 400 CAPSULE ORAL at 22:15

## 2018-07-30 RX ADMIN — GABAPENTIN 300 MILLIGRAM(S): 400 CAPSULE ORAL at 06:47

## 2018-07-30 RX ADMIN — Medication 1 APPLICATION(S): at 11:59

## 2018-07-30 RX ADMIN — BUDESONIDE AND FORMOTEROL FUMARATE DIHYDRATE 2 PUFF(S): 160; 4.5 AEROSOL RESPIRATORY (INHALATION) at 06:47

## 2018-07-30 RX ADMIN — HYDROMORPHONE HYDROCHLORIDE 0.5 MILLIGRAM(S): 2 INJECTION INTRAMUSCULAR; INTRAVENOUS; SUBCUTANEOUS at 23:15

## 2018-07-30 RX ADMIN — Medication 1 APPLICATION(S): at 18:21

## 2018-07-30 RX ADMIN — Medication 2 TABLET(S): at 06:47

## 2018-07-30 RX ADMIN — WARFARIN SODIUM 1 MILLIGRAM(S): 2.5 TABLET ORAL at 22:15

## 2018-07-30 RX ADMIN — Medication 50 MILLIGRAM(S): at 11:57

## 2018-07-30 NOTE — PROGRESS NOTE ADULT - SUBJECTIVE AND OBJECTIVE BOX
CC: f/u for MRSA knee infection and multiple wounds    Patient remains alert; denies pain    REVIEW OF SYSTEMS:  All other review of systems negative (Comprehensive ROS)    Antimicrobials:  long term minocycline 100 milliGRAM(s) Oral two times a day  Medications Reviewed    Vital Signs Last 24 Hrs  T(C): 36.6 (30 Jul 2018 08:45), Max: 36.8 (29 Jul 2018 19:10)  T(F): 97.8 (30 Jul 2018 08:45), Max: 98.2 (29 Jul 2018 19:10)  HR: 60 (30 Jul 2018 08:45) (60 - 70)  BP: 138/84 (30 Jul 2018 08:45) (126/77 - 172/80)  BP(mean): --  RR: 20 (30 Jul 2018 08:45) (18 - 20)  SpO2: 96% (30 Jul 2018 08:45) (95% - 99%)    PHYSICAL EXAM:  General: alert, no acute distress  Eyes:  anicteric, no conjunctival injection, no discharge  Oropharynx: no lesions or injection 	  Neck: supple, without adenopathy  Lungs: clear to auscultation  Heart: regular rate and rhythm; no murmur, rubs or gallops  Abdomen: soft, nondistended, nontender, wound vac in place  Skin: multiple lesions as described   Extremities:  dependent edema; R knee VAC- in place, no surrounding erythema noted  Neurologic: alert, moves all extremities    LAB RESULTS:                                 9.3    12.48 )-----------( 367      ( 28 Jul 2018 10:40 )             30.9         MICROBIOLOGY:  RECENT CULTURES:  Culture - Fungal, Other (07.20.18 @ 17:13)    Specimen Source: .Other Other, hip ulcer    Culture Results:   Testing in progress    Culture - Acid Fast - Other w/Smear (07.20.18 @ 17:13)    Specimen Source: .Other Other, right hip ulcer    Acid Fast Bacilli Smear:   No acid fast bacilli seen by fluorochrome stain      RADIOLOGY REVIEWED:  Xray Chest 1 View- PORTABLE-Routine (07.13.18 @ 16:39) >  No focal consolidation.

## 2018-07-30 NOTE — PROGRESS NOTE ADULT - ASSESSMENT
7/20 was asked by Dr Joseph to comment on the possibility of the pt having coumadin necrosis. It is a very rare occurrence and generally occurs acutely. The cause of coumadin necrosis would occur almost immediately in pts with a low protein s and c level. Both of these are vitamin k dependant factors and with the start of coumadin both of these factors would decrease. The protein s and c are modulators of factor 5 and would on initiation cause local ulcers. Pts like this would also have had clots in the past. The pts is not likely to have this with her history here. Obtaining a protein s and c levels here would not be helpful as these levels will be low on the coumadin. It would take 3 weeks or so off the coumadin for these levels to come up to baseline. Since this a rare case we might consider stopping the warfarin and give the pt a heparin derivative. Antiphospholipid antibody syndrome is not likely in the setting of normal platelets normal ptt in the past and no history of arterial and venous clots, no heart valve clots etc.     7/30 the notes from the last week were read and reviewed and it would appear that the pt was getting some wound healing. The hemoglobin has been stable and she may be going to rehab soon.

## 2018-07-30 NOTE — PROGRESS NOTE ADULT - SUBJECTIVE AND OBJECTIVE BOX
pain controlled  was able to have her nails manicured today    afvss  nad  abdominal wound - vac  RLE  thigh - dressing per wound care teams, lateral wound wet to dry per prs/wc teams  knee wound with vac  remainder of wounds dressed by wound care  5/5 ta/ehl/gcs  silt l4-s1  2+ dp pulse    ROS: depressed

## 2018-07-30 NOTE — PROGRESS NOTE ADULT - ASSESSMENT
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 Pt completed 10 days meropenem.     # Abd wound, multiple bl thigh wounds- now seems to be improving on steroids started >1wk  likely pyoderma gangrenosum vs panniculitis  ID-swab for afb and fungal cx -ve so far  (sp punch biopsy by derm 7/11, positive for urticaria, not consistent with PG  prior biopsies were not consistent with infection/vasculitis/malignancy   pt continued to develop new ulcers, pathergic response to biopsy sites  tissue culture grew coag neg staph likely colonized  IAs multiple biopsies have not been positive for PG,)  continue vac to abdomen, rt Knee, rt lat thigh  appreciate wound care recs, daily dressing changes, wound vac changes every other day    ID, plastics, wound care team on the case. , derm     Heme/onc: the questions of possible coumadin induced necrosis.   heme c/s noted-Coumadin necrosis unlikely given coumadin necrosis happen acutely with coumadin use.   okay to continue with coumadin. monitor INR.     # Pain control   cont dilaudid 2mg q4 PRN for severe pain  try IV tylenol 1g qd PRN for moderate pain  Try to avoid IV dilaudid, but okay to give 0.5mg IV before wound changes if necessary  can cont this in efforts to try to avoid dilaudid  gabapentin 300mg tid   Xanax 0.25 prior to dressing change  bowel regimen    # polymyalgia rheumatica  no signs of active rheum dz, evaluated by rheum 6/29    # TKR wound-mrsa  chronic minocycline suppression    # chr Anemia  stable, monitor h/h closely  appreciate heme recs    # S/P TAVR (transcatheter aortic valve replacement)  daily coumadin dosing, daily INR    # adjustment disorder with depressed mood  xanax prn    d/w Derm: wounds have been improving on steroids so will cont steroids for now.  Plan of care dw patient and  at length at bedside on Friday   agreed to work on dc plan to [facility]- willing to go to [facility] only  d/w cm for possible dc Monday

## 2018-07-30 NOTE — PROGRESS NOTE ADULT - SUBJECTIVE AND OBJECTIVE BOX
Plastic Surgery  Daily Progress Note     Subjective/24 Hour Events:  Patient seen and examined with team.  Vac changed by PT yesterday for clogged tubing.   Next change on 8/1.   Pain well controlled, no complaints.     Objective:    Allergies:  amoxicillin (Other)  IV Contrast (Flushing (Skin); Nausea)      Meds:   acetaminophen   Tablet. 650 milliGRAM(s) Oral every 6 hours PRN  ascorbic acid 500 milliGRAM(s) Oral daily  aspirin enteric coated 81 milliGRAM(s) Oral daily  betamethasone valerate 0.1% Ointment 1 Application(s) Topical two times a day  bisacodyl Suppository 10 milliGRAM(s) Rectal daily PRN  bismuth subsalicylate Liquid 30 milliLiter(s) Oral every 6 hours PRN  buDESOnide 160 MICROgram(s)/formoterol 4.5 MICROgram(s) Inhaler 2 Puff(s) Inhalation two times a day  calcium carbonate  625 mG + Vitamin D (OsCal 250 + D) 2 Tablet(s) Oral three times a day  clobetasol 0.05% Ointment 1 Application(s) Topical two times a day  Dakins Solution - 1/4 Strength 1 Application(s) Topical two times a day  folic acid 1 milliGRAM(s) Oral daily  gabapentin 300 milliGRAM(s) Oral three times a day  HYDROmorphone  Injectable 0.5 milliGRAM(s) IV Push every 12 hours PRN  loratadine 10 milliGRAM(s) Oral daily  magnesium hydroxide Suspension 30 milliLiter(s) Oral daily PRN  melatonin 3 milliGRAM(s) Oral at bedtime  metoprolol tartrate 12.5 milliGRAM(s) Oral two times a day  minocycline 100 milliGRAM(s) Oral two times a day  multivitamin 1 Tablet(s) Oral daily  pantoprazole    Tablet 40 milliGRAM(s) Oral before breakfast  polyethylene glycol 3350 17 Gram(s) Oral two times a day  predniSONE   Tablet 50 milliGRAM(s) Oral daily  senna 2 Tablet(s) Oral at bedtime PRN  simethicone 80 milliGRAM(s) Chew two times a day PRN  simvastatin 20 milliGRAM(s) Oral at bedtime  tiotropium 18 MICROgram(s) Capsule 1 Capsule(s) Inhalation daily      Vitals:  T(C): 36.6 (07-30-18 @ 08:45), Max: 36.8 (07-29-18 @ 19:10)  HR: 60 (07-30-18 @ 08:45) (60 - 70)  BP: 138/84 (07-30-18 @ 08:45) (126/77 - 172/80)  RR: 20 (07-30-18 @ 08:45) (18 - 20)  SpO2: 96% (07-30-18 @ 08:45) (95% - 99%)    I/O:     07-29-18 @ 07:01  -  07-30-18 @ 07:00  --------------------------------------------------------  IN: 1300 mL / OUT: 200 mL / NET: 1100 mL        Physical Exam:   - General: Alert, NAD  - Abd: central abdominal vac intact, holding suction. R knee vac intact, holding suction. R lateral and medial thigh dressings c/d/i. Skin without erythema.      Labs:         Studies:     Assessment:   A/P: s/p Right total knee insertion of spacer, irrigation and debridement, complex wound closure 3/23; readmitted 6/8 for appendicitis.     Plan:  - cont vac/dressing changes to R knee, thigh, and abdomen M/W/F, next on 8/1.  - remainder of wounds per wound care/derm.  - ok for discharge from [facility] perspective  - Rest of management per primary team    [facility] Plastic Surgery Pager: [phone].

## 2018-07-30 NOTE — PROGRESS NOTE ADULT - SUBJECTIVE AND OBJECTIVE BOX
Patient is a 77y old  Female who presents with a chief complaint of fever, lethargy (07 Jun 2018 22:19)      INTERVAL HPI/OVERNIGHT EVENTS: noted, feels well      Vital Signs Last 24 Hrs  T(C): 36.6 (30 Jul 2018 08:45), Max: 36.8 (29 Jul 2018 19:10)  T(F): 97.8 (30 Jul 2018 08:45), Max: 98.2 (29 Jul 2018 19:10)  HR: 60 (30 Jul 2018 08:45) (60 - 70)  BP: 138/84 (30 Jul 2018 08:45) (126/77 - 172/80)  BP(mean): --  RR: 20 (30 Jul 2018 08:45) (18 - 20)  SpO2: 96% (30 Jul 2018 08:45) (95% - 99%)    acetaminophen   Tablet. 650 milliGRAM(s) Oral every 6 hours PRN  ascorbic acid 500 milliGRAM(s) Oral daily  aspirin enteric coated 81 milliGRAM(s) Oral daily  betamethasone valerate 0.1% Ointment 1 Application(s) Topical two times a day  bisacodyl Suppository 10 milliGRAM(s) Rectal daily PRN  bismuth subsalicylate Liquid 30 milliLiter(s) Oral every 6 hours PRN  buDESOnide 160 MICROgram(s)/formoterol 4.5 MICROgram(s) Inhaler 2 Puff(s) Inhalation two times a day  calcium carbonate  625 mG + Vitamin D (OsCal 250 + D) 2 Tablet(s) Oral three times a day  clobetasol 0.05% Ointment 1 Application(s) Topical two times a day  Dakins Solution - 1/4 Strength 1 Application(s) Topical two times a day  folic acid 1 milliGRAM(s) Oral daily  gabapentin 300 milliGRAM(s) Oral three times a day  HYDROmorphone  Injectable 0.5 milliGRAM(s) IV Push every 12 hours PRN  loratadine 10 milliGRAM(s) Oral daily  magnesium hydroxide Suspension 30 milliLiter(s) Oral daily PRN  melatonin 3 milliGRAM(s) Oral at bedtime  metoprolol tartrate 12.5 milliGRAM(s) Oral two times a day  minocycline 100 milliGRAM(s) Oral two times a day  multivitamin 1 Tablet(s) Oral daily  pantoprazole    Tablet 40 milliGRAM(s) Oral before breakfast  polyethylene glycol 3350 17 Gram(s) Oral two times a day  predniSONE   Tablet 50 milliGRAM(s) Oral daily  senna 2 Tablet(s) Oral at bedtime PRN  simethicone 80 milliGRAM(s) Chew two times a day PRN  simvastatin 20 milliGRAM(s) Oral at bedtime  tiotropium 18 MICROgram(s) Capsule 1 Capsule(s) Inhalation daily      PHYSICAL EXAM:  GENERAL: NAD,   EYES: conjunctiva and sclera clear  ENMT: Moist mucous membranes  NECK: Supple, No JVD, Normal thyroid  NERVOUS SYSTEM:  Alert & Oriented X1-2,   CHEST/LUNG: Clear to auscultation bilaterally; No rales, rhonchi, wheezing, or rubs  HEART: Regular rate and rhythm; No murmurs, rubs, or gallops  ABDOMEN: Soft, Nontender, Nondistended; Bowel sounds present  EXTREMITIES:   Lower abd wound -healing well  2)rt knee-vac wound improved healing  3)rt medial thigh wound- margins indurated, healing slowly  4)Rt lateral thigh/buttock wound-large deep-necrotic tissue-packing   5)rt lateral thigh 2 small holes-wound packing done  6) Lt medial thigh '2 small deep ulcers-wound packing  7)Lt lateral /post thigh: 2-3 small punched out ulcers-wound packing LYMPH: No lymphadenopathy notedLYMPH: No lymphadenopathy noted  SKIN: No rashes or lesions    Consultant(s) Notes Reviewed:  [x ] YES  [ ] NO  Care Discussed with Consultants/Other Providers [ x] YES  [ ] NO    LABS:                        9.3    12.48 )-----------( 367      ( 28 Jul 2018 10:40 )             30.9           PT/INR - ( 30 Jul 2018 08:06 )   PT: 22.1 sec;   INR: 1.93 ratio         PTT - ( 30 Jul 2018 08:06 )  PTT:33.7 sec    CAPILLARY BLOOD GLUCOSE                RADIOLOGY & ADDITIONAL TESTS:    Imaging Personally Reviewed:  [ x] YES  [ ] NO

## 2018-07-30 NOTE — PROGRESS NOTE ADULT - SUBJECTIVE AND OBJECTIVE BOX
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    Patient was recently admitted from 4/9/18 - 6/6/18. Patient was initially presented for right total knee spacer exchange and I&D with complex wound closure. Previously had a spacer exchanged performed on 3/9 and then was discharged to rehab. Patient then returned on 4/9 with fever and concern for sepsis, found to have positive cultures from knee for MRSA as well as multifocal pneumonia. During admission, patient completed course of Daptomycin for MRSA prosthesis infection and was also treated for multifocal PNA with ?aztreonam. Patient had extensive wound care, followed by Surgery/Plastics/Orthopedics/Id and was discharged to rehab on 6/6 with suppressive minocycline. Admission complicated by lesion over thigh and lower pannus requiring wound vacs.  UA On 6/5 positive, but no culture sent and no antibiotics started.      Patient presents from rehab with fevers and lethargy shortly after discharge. In the ED, Tmax 100.6, HR 85,  /62, O2 98% on 2L NC. Labs showed mild leukocytosis with WBC 13.8, stable anemia Hgb 9.6, and normal platelets 319. Metabolic panel overall unremarkable with Cr 0.89. LFTs unremarkable. VBG with lactate 1.3. UA again positive with >50 WBCs, few bacteria, turbid appearance, and large LE.     Patient had a CT abdomen/pelvis that showed evidence of possible distal appendicitis. Evaluated by Surgery and family declining operative intervention due to high risk. Plastics to aid with wound management, to apply wound vac again in AM to thigh and pannus. Patient not felt to have evidence of wound infection. (07 Jun 2018 22:19)    I saw the pt several times on the last admit. and she had a ferritin that was over 400 and was given at times procrit when the hemoglobin got into the 7 range. The pt has a inflammatory anemia and the hemoglobin now is about 8.6 and she is normocytic hypochromic with a high rdw. Will at this time follow the pt and I see no need now for an extensive workup for this anemia. Will support as needed. 7/12 met with pt and aid at bedside and explained approach to the anemia no need for procrit. 7/16 notes reviewed from the last few days and the hemoglobin was 8.4. 7/18 case discussed with Dr Joseph and steroids may need to be given. the hemoglobin was noted to be 8.9 and epo will be on hold for now.     7/20 was asked by Dr Joseph to comment on the possibility of the pt having coumadin necrosis. It is a very rare occurrence and generally occurs acutely. The cause of coumadin necrosis would occur almost immediately in pts with a low protein s and c level. Both of these are vitamin k dependant factors and with the start of coumadin both of these factors would decrease. The protein s and c are modulators of factor 5 and would on initiation cause local ulcers. Pts like this would also have had clots in the past. The pts is not likely to have this with her history here. Obtaining a protein s and c levels here would not be helpful as these levels will be low on the coumadin. It would take 3 weeks or so off the coumadin for these levels to come up to baseline. Since this a rare case we might consider stopping the warfarin and give the pt a heparin derivative. Antiphospholipid antibody syndrome is not likely in the setting of normal platelets normal ptt in the past and no history of arterial and venous clots, no heart valve clots etc. 7/24 stable and pt to be given steroids, there may be some healing of the wounds noted on ortho exam.    7/30 the notes from the last week were read and reviewed and it would appear that the pt was getting some wound healing. The hemoglobin has been stable and she may be going to rehab soon.          MEDICATIONS  (STANDING):  ascorbic acid 500 milliGRAM(s) Oral daily  aspirin enteric coated 81 milliGRAM(s) Oral daily  betamethasone valerate 0.1% Ointment 1 Application(s) Topical two times a day  buDESOnide 160 MICROgram(s)/formoterol 4.5 MICROgram(s) Inhaler 2 Puff(s) Inhalation two times a day  calcium carbonate  625 mG + Vitamin D (OsCal 250 + D) 2 Tablet(s) Oral three times a day  clobetasol 0.05% Ointment 1 Application(s) Topical two times a day  Dakins Solution - 1/4 Strength 1 Application(s) Topical two times a day  folic acid 1 milliGRAM(s) Oral daily  gabapentin 300 milliGRAM(s) Oral three times a day  loratadine 10 milliGRAM(s) Oral daily  melatonin 3 milliGRAM(s) Oral at bedtime  metoprolol tartrate 12.5 milliGRAM(s) Oral two times a day  minocycline 100 milliGRAM(s) Oral two times a day  multivitamin 1 Tablet(s) Oral daily  pantoprazole    Tablet 40 milliGRAM(s) Oral before breakfast  polyethylene glycol 3350 17 Gram(s) Oral two times a day  predniSONE   Tablet 50 milliGRAM(s) Oral daily  simvastatin 20 milliGRAM(s) Oral at bedtime  tiotropium 18 MICROgram(s) Capsule 1 Capsule(s) Inhalation daily                    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  Artificial cardiac pacemaker: 12/25/17  H/O cardiac catheterization: 11/29/16 HERBERT placed on LAD  H/O endoscopy: with dilatation of esophageal stricture 2013  S/P cataract surgery: x2; 3-4yr ago  S/P gastroplasty: 28 yrs ago  S/P cholecystectomy: more than 15 years ago  S/P total knee replacement: left, 15yr ago  S/P total knee replacement: right, 12yr ago  S/P hysterectomy: 24yr ago      SOCIAL HISTORY:    FAMILY HISTORY:  No pertinent family history in first degree relatives      Allergies    amoxicillin (Other)    Intolerances    IV Contrast (Flushing (Skin); Nausea)      PHYSICAL EXAM as per attending and she was asleep with the aid present and the case was discussed with the aide, the pt did not appear to be in distress  General: adult in NAD    BLOOD SMEAR INTERPRETATION:    RADIOLOGY :

## 2018-07-30 NOTE — PROGRESS NOTE ADULT - ASSESSMENT
Morbid Obesity  PMR  Failed RT TKA with initial strep infection followed by MRSA infected spacer  Spontaneous areas of fat necrosis, pyoderma gangrenosum vs. panniculitis as per Derm, improving with steroids  Fungal/AFB cultures remain negative   Afebrile  Mild leukocytosis likely steroid effect  Suggest:  Continue chronic suppressive Minocycline for R knee spacer MRSA infection  Steroid trial as per derm  Local wound care, VAC  plastics surgery assessment noted and appreciated, wound vac change TIW, next on 8/1  rehab plans noted  d/w pt and family at bedside

## 2018-07-30 NOTE — PROGRESS NOTE ADULT - ASSESSMENT
no plan for orthopedic surgical intervention per family and patient preference  vac and wound care per PRS and wound care teams  per derm prednisone to treat possible atypical presentation of pyoderma granulosum, per report improving  PO abx per ID team, minocycline  encourage patient to spend majority of bed oob in bedside chair as able, has been refusing oob for past 1 month, PLEASE HAVE PT COME BY TO MOBILIZE PATIENT, she must remain 50% wb on the RLE and NO KNEE MOTION allowed, knee immobilizer if oob  coumadin, inr goal 2-3  continue to optimize nutrition  continue to involve psych given depression symptoms

## 2018-07-31 LAB
APTT BLD: 35.2 SEC — SIGNIFICANT CHANGE UP (ref 27.5–37.4)
INR BLD: 2.39 RATIO — HIGH (ref 0.88–1.16)
PROTHROM AB SERPL-ACNC: 27.5 SEC — HIGH (ref 10–13.1)

## 2018-07-31 RX ORDER — HYDROMORPHONE HYDROCHLORIDE 2 MG/ML
2 INJECTION INTRAMUSCULAR; INTRAVENOUS; SUBCUTANEOUS EVERY 12 HOURS
Qty: 0 | Refills: 0 | Status: DISCONTINUED | OUTPATIENT
Start: 2018-07-31 | End: 2018-08-07

## 2018-07-31 RX ORDER — TRAMADOL HYDROCHLORIDE 50 MG/1
25 TABLET ORAL ONCE
Qty: 0 | Refills: 0 | Status: DISCONTINUED | OUTPATIENT
Start: 2018-07-31 | End: 2018-08-16

## 2018-07-31 RX ORDER — HYDROMORPHONE HYDROCHLORIDE 2 MG/ML
2 INJECTION INTRAMUSCULAR; INTRAVENOUS; SUBCUTANEOUS ONCE
Qty: 0 | Refills: 0 | Status: DISCONTINUED | OUTPATIENT
Start: 2018-07-31 | End: 2018-07-31

## 2018-07-31 RX ORDER — WARFARIN SODIUM 2.5 MG/1
2 TABLET ORAL ONCE
Qty: 0 | Refills: 0 | Status: COMPLETED | OUTPATIENT
Start: 2018-07-31 | End: 2018-07-31

## 2018-07-31 RX ORDER — TRAMADOL HYDROCHLORIDE 50 MG/1
25 TABLET ORAL EVERY 12 HOURS
Qty: 0 | Refills: 0 | Status: DISCONTINUED | OUTPATIENT
Start: 2018-07-31 | End: 2018-08-06

## 2018-07-31 RX ADMIN — Medication 2 TABLET(S): at 06:38

## 2018-07-31 RX ADMIN — MINOCYCLINE HYDROCHLORIDE 100 MILLIGRAM(S): 45 TABLET, EXTENDED RELEASE ORAL at 18:36

## 2018-07-31 RX ADMIN — Medication 50 MILLIGRAM(S): at 13:01

## 2018-07-31 RX ADMIN — SIMVASTATIN 20 MILLIGRAM(S): 20 TABLET, FILM COATED ORAL at 22:37

## 2018-07-31 RX ADMIN — Medication 500 MILLIGRAM(S): at 14:06

## 2018-07-31 RX ADMIN — Medication 1 MILLIGRAM(S): at 13:00

## 2018-07-31 RX ADMIN — Medication 1 TABLET(S): at 13:01

## 2018-07-31 RX ADMIN — BUDESONIDE AND FORMOTEROL FUMARATE DIHYDRATE 2 PUFF(S): 160; 4.5 AEROSOL RESPIRATORY (INHALATION) at 18:36

## 2018-07-31 RX ADMIN — Medication 12.5 MILLIGRAM(S): at 06:38

## 2018-07-31 RX ADMIN — PANTOPRAZOLE SODIUM 40 MILLIGRAM(S): 20 TABLET, DELAYED RELEASE ORAL at 06:38

## 2018-07-31 RX ADMIN — Medication 1 APPLICATION(S): at 22:37

## 2018-07-31 RX ADMIN — POLYETHYLENE GLYCOL 3350 17 GRAM(S): 17 POWDER, FOR SOLUTION ORAL at 06:38

## 2018-07-31 RX ADMIN — Medication 2 TABLET(S): at 22:37

## 2018-07-31 RX ADMIN — BUDESONIDE AND FORMOTEROL FUMARATE DIHYDRATE 2 PUFF(S): 160; 4.5 AEROSOL RESPIRATORY (INHALATION) at 06:38

## 2018-07-31 RX ADMIN — Medication 1 APPLICATION(S): at 11:29

## 2018-07-31 RX ADMIN — TIOTROPIUM BROMIDE 1 CAPSULE(S): 18 CAPSULE ORAL; RESPIRATORY (INHALATION) at 13:00

## 2018-07-31 RX ADMIN — WARFARIN SODIUM 2 MILLIGRAM(S): 2.5 TABLET ORAL at 22:37

## 2018-07-31 RX ADMIN — GABAPENTIN 300 MILLIGRAM(S): 400 CAPSULE ORAL at 22:37

## 2018-07-31 RX ADMIN — GABAPENTIN 300 MILLIGRAM(S): 400 CAPSULE ORAL at 06:38

## 2018-07-31 RX ADMIN — GABAPENTIN 300 MILLIGRAM(S): 400 CAPSULE ORAL at 14:06

## 2018-07-31 RX ADMIN — Medication 1 APPLICATION(S): at 06:38

## 2018-07-31 RX ADMIN — Medication 650 MILLIGRAM(S): at 06:40

## 2018-07-31 RX ADMIN — Medication 1 APPLICATION(S): at 22:38

## 2018-07-31 RX ADMIN — Medication 1 APPLICATION(S): at 18:35

## 2018-07-31 RX ADMIN — Medication 1 APPLICATION(S): at 11:30

## 2018-07-31 RX ADMIN — HYDROMORPHONE HYDROCHLORIDE 2 MILLIGRAM(S): 2 INJECTION INTRAMUSCULAR; INTRAVENOUS; SUBCUTANEOUS at 11:00

## 2018-07-31 RX ADMIN — Medication 81 MILLIGRAM(S): at 13:01

## 2018-07-31 RX ADMIN — MINOCYCLINE HYDROCHLORIDE 100 MILLIGRAM(S): 45 TABLET, EXTENDED RELEASE ORAL at 06:38

## 2018-07-31 RX ADMIN — Medication 3 MILLIGRAM(S): at 22:37

## 2018-07-31 RX ADMIN — Medication 12.5 MILLIGRAM(S): at 18:37

## 2018-07-31 RX ADMIN — HYDROMORPHONE HYDROCHLORIDE 2 MILLIGRAM(S): 2 INJECTION INTRAMUSCULAR; INTRAVENOUS; SUBCUTANEOUS at 21:40

## 2018-07-31 RX ADMIN — Medication 2 TABLET(S): at 14:06

## 2018-07-31 RX ADMIN — HYDROMORPHONE HYDROCHLORIDE 2 MILLIGRAM(S): 2 INJECTION INTRAMUSCULAR; INTRAVENOUS; SUBCUTANEOUS at 11:30

## 2018-07-31 RX ADMIN — LORATADINE 10 MILLIGRAM(S): 10 TABLET ORAL at 13:00

## 2018-07-31 NOTE — PROGRESS NOTE ADULT - ATTENDING COMMENTS
Reina Murphy MD  [facility]    Dr Marrero will be covering me starting   8/ 1/18  Please page

## 2018-07-31 NOTE — PROGRESS NOTE ADULT - SUBJECTIVE AND OBJECTIVE BOX
Patient is a 77y old  Female who presents with a chief complaint of fever, lethargy (07 Jun 2018 22:19)      INTERVAL HPI/OVERNIGHT EVENTS: noted, feels well      Vital Signs Last 24 Hrs  T(C): 36.7 (31 Jul 2018 10:44), Max: 36.7 (30 Jul 2018 16:42)  T(F): 98 (31 Jul 2018 10:44), Max: 98 (30 Jul 2018 16:42)  HR: 59 (31 Jul 2018 10:44) (59 - 94)  BP: 145/84 (31 Jul 2018 10:44) (142/80 - 167/89)  BP(mean): --  RR: 18 (31 Jul 2018 10:44) (18 - 18)  SpO2: 95% (31 Jul 2018 10:44) (95% - 98%)    acetaminophen   Tablet. 650 milliGRAM(s) Oral every 6 hours PRN  ascorbic acid 500 milliGRAM(s) Oral daily  aspirin enteric coated 81 milliGRAM(s) Oral daily  betamethasone valerate 0.1% Ointment 1 Application(s) Topical two times a day  bisacodyl Suppository 10 milliGRAM(s) Rectal daily PRN  bismuth subsalicylate Liquid 30 milliLiter(s) Oral every 6 hours PRN  buDESOnide 160 MICROgram(s)/formoterol 4.5 MICROgram(s) Inhaler 2 Puff(s) Inhalation two times a day  calcium carbonate  625 mG + Vitamin D (OsCal 250 + D) 2 Tablet(s) Oral three times a day  clobetasol 0.05% Ointment 1 Application(s) Topical two times a day  Dakins Solution - 1/4 Strength 1 Application(s) Topical two times a day  folic acid 1 milliGRAM(s) Oral daily  gabapentin 300 milliGRAM(s) Oral three times a day  HYDROmorphone   Tablet 2 milliGRAM(s) Oral every 12 hours PRN  loratadine 10 milliGRAM(s) Oral daily  magnesium hydroxide Suspension 30 milliLiter(s) Oral daily PRN  melatonin 3 milliGRAM(s) Oral at bedtime  metoprolol tartrate 12.5 milliGRAM(s) Oral two times a day  minocycline 100 milliGRAM(s) Oral two times a day  multivitamin 1 Tablet(s) Oral daily  pantoprazole    Tablet 40 milliGRAM(s) Oral before breakfast  polyethylene glycol 3350 17 Gram(s) Oral two times a day  predniSONE   Tablet 50 milliGRAM(s) Oral daily  senna 2 Tablet(s) Oral at bedtime PRN  simethicone 80 milliGRAM(s) Chew two times a day PRN  simvastatin 20 milliGRAM(s) Oral at bedtime  tiotropium 18 MICROgram(s) Capsule 1 Capsule(s) Inhalation daily  traMADol 25 milliGRAM(s) Oral every 12 hours PRN  traMADol 25 milliGRAM(s) Oral once      PHYSICAL EXAM:  GENERAL: NAD,   EYES: conjunctiva and sclera clear  ENMT: Moist mucous membranes  NECK: Supple, No JVD, Normal thyroid  NERVOUS SYSTEM:  Alert & Oriented X3,   CHEST/LUNG: Clear to auscultation bilaterally; No rales, rhonchi, wheezing, or rubs  HEART: Regular rate and rhythm; No murmurs, rubs, or gallops  ABDOMEN: Soft, Nontender, Nondistended; Bowel sounds present  EXTREMITIES:   Lower abd wound -healing well  2)rt knee-vac wound improved healing  3)rt medial thigh wound- margins indurated, healing slowly  4)Rt lateral thigh/buttock wound-large deep-necrotic tissue-packing   5)rt lateral thigh 2 small holes-wound packing done  6) Lt medial thigh '2 small deep ulcers-wound packing  7)Lt lateral /post thigh: 2-3 small punched out ulcers-wound packing      Consultant(s) Notes Reviewed:  [x ] YES  [ ] NO  Care Discussed with Consultants/Other Providers [ x] YES  [ ] NO    LABS:          PT/INR - ( 31 Jul 2018 08:28 )   PT: 27.5 sec;   INR: 2.39 ratio         PTT - ( 31 Jul 2018 08:28 )  PTT:35.2 sec    CAPILLARY BLOOD GLUCOSE                RADIOLOGY & ADDITIONAL TESTS:    Imaging Personally Reviewed:  [x ] YES  [ ] NO

## 2018-07-31 NOTE — PROGRESS NOTE ADULT - MINUTES
26
26
40
26
35
36
26
35
36
36
30

## 2018-07-31 NOTE — PROGRESS NOTE ADULT - ASSESSMENT
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 Pt completed 10 days meropenem.     # Abd wound, multiple bl thigh wounds- now seems to be improving on steroids started >1wk  likely pyoderma gangrenosum vs panniculitis  ID-swab for afb and fungal cx -ve so far  (sp punch biopsy by derm 7/11, positive for urticaria, not consistent with PG  prior biopsies were not consistent with infection/vasculitis/malignancy   pt continued to develop new ulcers, pathergic response to biopsy sites  tissue culture grew coag neg staph likely colonized  IAs multiple biopsies have not been positive for PG,)  continue vac to abdomen, rt Knee, rt lat thigh  appreciate wound care recs, daily dressing changes, wound vac changes every other day    ID, plastics, wound care team on the case. , derm     Heme/onc: the questions of possible coumadin induced necrosis.   heme c/s noted-Coumadin necrosis unlikely given coumadin necrosis happen acutely with coumadin use.   okay to continue with coumadin. monitor INR.     # Pain control   cont dilaudid 2mg po bid w dressing changes  tramadol prn for pain- pt tolerating well  tylenol po prn   avoid IV dilaudid/IV pain meds  can cont this in efforts to try to avoid dilaudid  gabapentin 300mg tid   Xanax 0.25 prior to dressing change  bowel regimen    # polymyalgia rheumatica  no signs of active rheum dz, evaluated by rheum 6/29    # TKR wound-mrsa  chronic minocycline suppression    # chr Anemia  stable, monitor h/h closely  appreciate heme recs    # S/P TAVR (transcatheter aortic valve replacement)  daily coumadin dosing, daily INR    # adjustment disorder with depressed mood  xanax prn    d/w Derm: wounds have been improving on steroids so will cont steroids for now.  Plan of care dw patient and  at length at bedside on Friday   agreed to work on dc plan to [facility]- willing to go to [facility] only  d/w cm plan of care and pt medically stable for dc to rehab  will avoid iv pain meds as much possible

## 2018-07-31 NOTE — PROGRESS NOTE ADULT - NSHPATTENDINGPLANDISCUSS_GEN_ALL_CORE
pt,  np,  
pt,  zahratr and  np,
pt, np, cm, 
pt and medicine np
pt,  np,
pt,  zahratr and  np,
pt, dgtr at bedside and medicien np
pt and aide at bedside
pt and aide at bedside
pt and medicine np
pt and medicine np, cm
pt,  np,  
pt,  np, derm fellow
pt, and medicine np
pt, dgtr, np
pt, medicine np, dr isidro, derm
pt, np
Plastic Surgery Team

## 2018-07-31 NOTE — CHART NOTE - NSCHARTNOTEFT_GEN_A_CORE
Pt admitted c acute appendicitis (completed antibiotics), abdominal wound and multiple luann. leg wounds, noted wound VAC continues to be in place, noted Dermatology and plastic surgery following. Interim events noted.    Source: Patient [x ]    Family [ ]     other [x ] Aide Lissa at bedside    Diet : Regular diet with nepro 2 x daily and howard 2 x daily       Per aide pt with nausea yesterday with 1 episode of vomiting, last BM yesterday-small     PO intake:  Per aide pt's appetite has increased over the past week and pt has been able to eat more. Pt will take some of her meals and supplement with nepro with meds, howard usually once daily and various snacks including cheese and crackers, foods from home.         Current Weight: 215.3 lbs (6/27), 219.8 lbs (7/18), 222 lbs (7/25), wt remains stable  % Weight Change    Pertinent Medications: MEDICATIONS  (STANDING):  ascorbic acid 500 milliGRAM(s) Oral daily  aspirin enteric coated 81 milliGRAM(s) Oral daily  betamethasone valerate 0.1% Ointment 1 Application(s) Topical two times a day  buDESOnide 160 MICROgram(s)/formoterol 4.5 MICROgram(s) Inhaler 2 Puff(s) Inhalation two times a day  calcium carbonate  625 mG + Vitamin D (OsCal 250 + D) 2 Tablet(s) Oral three times a day  clobetasol 0.05% Ointment 1 Application(s) Topical two times a day  Dakins Solution - 1/4 Strength 1 Application(s) Topical two times a day  folic acid 1 milliGRAM(s) Oral daily  gabapentin 300 milliGRAM(s) Oral three times a day  loratadine 10 milliGRAM(s) Oral daily  melatonin 3 milliGRAM(s) Oral at bedtime  metoprolol tartrate 12.5 milliGRAM(s) Oral two times a day  minocycline 100 milliGRAM(s) Oral two times a day  multivitamin 1 Tablet(s) Oral daily  pantoprazole    Tablet 40 milliGRAM(s) Oral before breakfast  polyethylene glycol 3350 17 Gram(s) Oral two times a day  predniSONE   Tablet 50 milliGRAM(s) Oral daily  simvastatin 20 milliGRAM(s) Oral at bedtime  tiotropium 18 MICROgram(s) Capsule 1 Capsule(s) Inhalation daily  traMADol 25 milliGRAM(s) Oral once    MEDICATIONS  (PRN):  acetaminophen   Tablet. 650 milliGRAM(s) Oral every 6 hours PRN Mild Pain (1 - 3)  bisacodyl Suppository 10 milliGRAM(s) Rectal daily PRN Constipation  bismuth subsalicylate Liquid 30 milliLiter(s) Oral every 6 hours PRN Cramping/abd pain  HYDROmorphone   Tablet 2 milliGRAM(s) Oral every 12 hours PRN prior to dressing change/ 30mins before dressing change.  magnesium hydroxide Suspension 30 milliLiter(s) Oral daily PRN Constipation  senna 2 Tablet(s) Oral at bedtime PRN Constipation  simethicone 80 milliGRAM(s) Chew two times a day PRN Gas  traMADol 25 milliGRAM(s) Oral every 12 hours PRN Moderate Pain (4 - 6)    Pertinent Labs:  Reviewed      Skin: 1+ generalized edema, skin noted with multiple wounds    Estimated Needs:   [ x] no change since previous assessment  [ ] recalculated:       Previous Nutrition Diagnosis:     [x ] Malnutrition (moderate)         Nutrition Diagnosis is [ x] ongoing  [ ] resolved [ ] not applicable          New Nutrition Diagnosis: [ x] not applicable         Interventions:     Recommend    1. Continue regular diet with nepro 2 x dainilay, Howard on hold at this time as pt with several at bedside she wishes to utilize   2. Continue to encourage po intake and obtain/honor food preferences as able, continued to encouraged adequate po intake with emphasis on protein containing foods-will provide chedder cheese daily at lunch       Monitoring and Evaluation:     [ x] PO intake [x ] Tolerance to diet prescription [ x] weights [x ] follow up per protocol    [ ] other:

## 2018-08-01 LAB
INR BLD: 2.96 RATIO — HIGH (ref 0.88–1.16)
PROTHROM AB SERPL-ACNC: 32.7 SEC — HIGH (ref 9.8–12.7)

## 2018-08-01 PROCEDURE — 97606 NEG PRS WND THER DME>50 SQCM: CPT

## 2018-08-01 PROCEDURE — 99232 SBSQ HOSP IP/OBS MODERATE 35: CPT | Mod: 25

## 2018-08-01 RX ORDER — WARFARIN SODIUM 2.5 MG/1
2 TABLET ORAL ONCE
Qty: 0 | Refills: 0 | Status: COMPLETED | OUTPATIENT
Start: 2018-08-01 | End: 2018-08-01

## 2018-08-01 RX ADMIN — TRAMADOL HYDROCHLORIDE 25 MILLIGRAM(S): 50 TABLET ORAL at 13:06

## 2018-08-01 RX ADMIN — Medication 650 MILLIGRAM(S): at 23:31

## 2018-08-01 RX ADMIN — SIMVASTATIN 20 MILLIGRAM(S): 20 TABLET, FILM COATED ORAL at 21:44

## 2018-08-01 RX ADMIN — PANTOPRAZOLE SODIUM 40 MILLIGRAM(S): 20 TABLET, DELAYED RELEASE ORAL at 05:51

## 2018-08-01 RX ADMIN — BUDESONIDE AND FORMOTEROL FUMARATE DIHYDRATE 2 PUFF(S): 160; 4.5 AEROSOL RESPIRATORY (INHALATION) at 18:05

## 2018-08-01 RX ADMIN — TIOTROPIUM BROMIDE 1 CAPSULE(S): 18 CAPSULE ORAL; RESPIRATORY (INHALATION) at 13:10

## 2018-08-01 RX ADMIN — Medication 1 APPLICATION(S): at 10:04

## 2018-08-01 RX ADMIN — Medication 1 APPLICATION(S): at 21:44

## 2018-08-01 RX ADMIN — Medication 1 APPLICATION(S): at 18:06

## 2018-08-01 RX ADMIN — Medication 2 TABLET(S): at 05:51

## 2018-08-01 RX ADMIN — WARFARIN SODIUM 2 MILLIGRAM(S): 2.5 TABLET ORAL at 21:44

## 2018-08-01 RX ADMIN — MINOCYCLINE HYDROCHLORIDE 100 MILLIGRAM(S): 45 TABLET, EXTENDED RELEASE ORAL at 18:06

## 2018-08-01 RX ADMIN — TRAMADOL HYDROCHLORIDE 25 MILLIGRAM(S): 50 TABLET ORAL at 13:45

## 2018-08-01 RX ADMIN — Medication 50 MILLIGRAM(S): at 13:08

## 2018-08-01 RX ADMIN — BUDESONIDE AND FORMOTEROL FUMARATE DIHYDRATE 2 PUFF(S): 160; 4.5 AEROSOL RESPIRATORY (INHALATION) at 05:51

## 2018-08-01 RX ADMIN — HYDROMORPHONE HYDROCHLORIDE 2 MILLIGRAM(S): 2 INJECTION INTRAMUSCULAR; INTRAVENOUS; SUBCUTANEOUS at 21:44

## 2018-08-01 RX ADMIN — Medication 500 MILLIGRAM(S): at 13:07

## 2018-08-01 RX ADMIN — GABAPENTIN 300 MILLIGRAM(S): 400 CAPSULE ORAL at 21:44

## 2018-08-01 RX ADMIN — HYDROMORPHONE HYDROCHLORIDE 2 MILLIGRAM(S): 2 INJECTION INTRAMUSCULAR; INTRAVENOUS; SUBCUTANEOUS at 11:00

## 2018-08-01 RX ADMIN — Medication 3 MILLIGRAM(S): at 21:44

## 2018-08-01 RX ADMIN — GABAPENTIN 300 MILLIGRAM(S): 400 CAPSULE ORAL at 13:09

## 2018-08-01 RX ADMIN — Medication 12.5 MILLIGRAM(S): at 18:05

## 2018-08-01 RX ADMIN — GABAPENTIN 300 MILLIGRAM(S): 400 CAPSULE ORAL at 05:51

## 2018-08-01 RX ADMIN — LORATADINE 10 MILLIGRAM(S): 10 TABLET ORAL at 13:08

## 2018-08-01 RX ADMIN — MINOCYCLINE HYDROCHLORIDE 100 MILLIGRAM(S): 45 TABLET, EXTENDED RELEASE ORAL at 05:51

## 2018-08-01 RX ADMIN — HYDROMORPHONE HYDROCHLORIDE 2 MILLIGRAM(S): 2 INJECTION INTRAMUSCULAR; INTRAVENOUS; SUBCUTANEOUS at 09:42

## 2018-08-01 RX ADMIN — Medication 1 MILLIGRAM(S): at 13:07

## 2018-08-01 RX ADMIN — Medication 2 TABLET(S): at 13:09

## 2018-08-01 RX ADMIN — Medication 2 TABLET(S): at 21:44

## 2018-08-01 RX ADMIN — Medication 81 MILLIGRAM(S): at 13:08

## 2018-08-01 RX ADMIN — Medication 1 APPLICATION(S): at 05:52

## 2018-08-01 RX ADMIN — Medication 1 TABLET(S): at 13:07

## 2018-08-01 RX ADMIN — Medication 12.5 MILLIGRAM(S): at 05:51

## 2018-08-01 RX ADMIN — Medication 1 APPLICATION(S): at 10:05

## 2018-08-01 RX ADMIN — Medication 1 APPLICATION(S): at 21:45

## 2018-08-01 NOTE — PROGRESS NOTE ADULT - ASSESSMENT
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 Pt completed 10 days meropenem.     # Abd wound, multiple bl thigh wounds- now seems to be improving on steroids started 8/18  likely pyoderma gangrenosum vs panniculitis, despite negative biopsies  wounds have been improving with steroids, will continue steroids  ID-swab for afb and fungal cx - neg  (sp punch biopsy by derm 7/11, positive for urticaria, not consistent with PG)  prior biopsies were not consistent with infection/vasculitis/malignancy   pt continued to develop new ulcers, pathergic response to biopsy sites  tissue culture grew coag neg staph likely colonized  continue vac to abdomen, rt Knee  appreciate wound care recs, daily dressing changes, wound vac changes every other day  heme did not feel coumadin necrosis likely given chronic use of coumadin    # Pain control  cont dilaudid 2mg po bid w dressing changes  tramadol prn for pain- pt tolerating well  tylenol po prn  avoid IV dilaudid/IV pain meds  gabapentin 300mg tid  Xanax 0.25 prior to dressing change  bowel regimen    # polymyalgia rheumatica  no signs of active rheum dz, evaluated by rheum 6/29    # TKR wound-mrsa  chronic minocycline suppression    # chr Anemia  stable, monitor h/h closely  appreciate heme recs    # S/P TAVR (transcatheter aortic valve replacement)  daily coumadin dosing, daily INR    # adjustment disorder with depressed mood  xanax prn    dispo:   medically improving and stable   will allow DC to [facility] only, threatens to call [facility] and  if transferred to another facility  CM following  will avoid IV pain medications in order to facilitate DC

## 2018-08-01 NOTE — PROGRESS NOTE ADULT - SUBJECTIVE AND OBJECTIVE BOX
Plastic Surgery  Daily Progress Note     Subjective/24 Hour Events:  Patient seen and examined.  Wound vacs changed with PA and PT at bedside, along with dressing changes of other wounds.   Pain during dressing changes.   Otherwise no other complaints.        Objective:    Allergies:  amoxicillin (Other)  IV Contrast (Flushing (Skin); Nausea)      Meds:   acetaminophen   Tablet. 650 milliGRAM(s) Oral every 6 hours PRN  ascorbic acid 500 milliGRAM(s) Oral daily  aspirin enteric coated 81 milliGRAM(s) Oral daily  betamethasone valerate 0.1% Ointment 1 Application(s) Topical two times a day  bisacodyl Suppository 10 milliGRAM(s) Rectal daily PRN  bismuth subsalicylate Liquid 30 milliLiter(s) Oral every 6 hours PRN  buDESOnide 160 MICROgram(s)/formoterol 4.5 MICROgram(s) Inhaler 2 Puff(s) Inhalation two times a day  calcium carbonate  625 mG + Vitamin D (OsCal 250 + D) 2 Tablet(s) Oral three times a day  clobetasol 0.05% Ointment 1 Application(s) Topical two times a day  Dakins Solution - 1/4 Strength 1 Application(s) Topical two times a day  folic acid 1 milliGRAM(s) Oral daily  gabapentin 300 milliGRAM(s) Oral three times a day  HYDROmorphone   Tablet 2 milliGRAM(s) Oral every 12 hours PRN  loratadine 10 milliGRAM(s) Oral daily  magnesium hydroxide Suspension 30 milliLiter(s) Oral daily PRN  melatonin 3 milliGRAM(s) Oral at bedtime  metoprolol tartrate 12.5 milliGRAM(s) Oral two times a day  minocycline 100 milliGRAM(s) Oral two times a day  multivitamin 1 Tablet(s) Oral daily  pantoprazole    Tablet 40 milliGRAM(s) Oral before breakfast  polyethylene glycol 3350 17 Gram(s) Oral two times a day  predniSONE   Tablet 50 milliGRAM(s) Oral daily  senna 2 Tablet(s) Oral at bedtime PRN  simethicone 80 milliGRAM(s) Chew two times a day PRN  simvastatin 20 milliGRAM(s) Oral at bedtime  tiotropium 18 MICROgram(s) Capsule 1 Capsule(s) Inhalation daily  traMADol 25 milliGRAM(s) Oral every 12 hours PRN  traMADol 25 milliGRAM(s) Oral once      Vitals:  T(C): 36.7 (08-01-18 @ 11:23), Max: 36.7 (07-31-18 @ 14:47)  HR: 74 (08-01-18 @ 11:23) (61 - 74)  BP: 142/82 (08-01-18 @ 11:23) (131/83 - 142/82)  RR: 18 (08-01-18 @ 11:23) (18 - 18)  SpO2: 96% (08-01-18 @ 11:23) (94% - 97%)    I/O:     07-31-18 @ 07:01  -  08-01-18 @ 07:00  --------------------------------------------------------  IN: 0 mL / OUT: 0 mL / NET: 0 mL        Physical Exam:   - General: Alert, responsive.   - Abd: central abdominal vac changed, wound approximately 10.5x2 cm, healing well, no purulence, erythema, swelling, fluid collection. R knee vac changed, wound healing without purulence, swelling, fluid collection. R medial thigh wound dressing changed, appr. 14x8cm with gray filmy discharge, very tender, nonodorous. R lateral thigh wounds dressing changed, large inferior wound measuring appr. 12x6cm with brown-green discharge.     Labs:         Studies:     Assessment:   77F s/p Right total knee insertion of spacer, irrigation and debridement, complex wound closure 3/23; readmitted 6/8 for appendicitis, course has been complicated by multiple wounds most notably R knee, midline abdomen, R medial thigh, R lateral thigh, L medial thigh, L lateral thigh.     Plan:  - cont vac/dressing changes to R knee, thigh, and abdomen M/W/F, next on 8/3.  - remainder of wounds per wound care/derm.  - ok for discharge from PRS perspective  - Rest of management per primary team    [facility] Plastic Surgery Pager: [phone].

## 2018-08-01 NOTE — PROGRESS NOTE ADULT - SUBJECTIVE AND OBJECTIVE BOX
Patient is a 77y old  Female who presents with a chief complaint of fever, lethargy (07 Jun 2018 22:19)      SUBJECTIVE / OVERNIGHT EVENTS:    Patient seen and examined. denies cp sob. denies pain. in good spirits.      Vital Signs Last 24 Hrs  T(C): 36.7 (31 Jul 2018 21:25), Max: 36.7 (31 Jul 2018 10:44)  T(F): 98.1 (31 Jul 2018 21:25), Max: 98.1 (31 Jul 2018 21:25)  HR: 69 (31 Jul 2018 21:25) (59 - 69)  BP: 142/82 (31 Jul 2018 21:25) (131/83 - 145/84)  BP(mean): --  RR: 18 (31 Jul 2018 21:25) (18 - 18)  SpO2: 96% (31 Jul 2018 21:25) (94% - 97%)  I&O's Summary    31 Jul 2018 07:01  -  01 Aug 2018 07:00  --------------------------------------------------------  IN: 0 mL / OUT: 0 mL / NET: 0 mL        PE:  GENERAL: NAD, AAOx3, morbidly obese  HEAD:  Atraumatic, Normocephalic  EYES: EOMI, PERRLA, conjunctiva and sclera clear  NECK: Supple, No JVD  CHEST/LUNG: CTABL, No wheeze  HEART: Regular rate and rhythm; no murmur  ABDOMEN: Soft, Nontender, Nondistended; Bowel sounds present, abd wound vac  EXTREMITIES:  2+ Peripheral Pulses, right knee wound vac, bl dressing medial thighs  NEURO: No focal deficits    LABS:          PT/INR - ( 01 Aug 2018 06:35 )   PT: 32.7 sec;   INR: 2.96 ratio         PTT - ( 31 Jul 2018 08:28 )  PTT:35.2 sec  CAPILLARY BLOOD GLUCOSE                RADIOLOGY & ADDITIONAL TESTS:    Imaging Personally Reviewed:  [x] YES  [ ] NO    Consultant(s) Notes Reviewed:  [x] YES  [ ] NO    MEDICATIONS  (STANDING):  ascorbic acid 500 milliGRAM(s) Oral daily  aspirin enteric coated 81 milliGRAM(s) Oral daily  betamethasone valerate 0.1% Ointment 1 Application(s) Topical two times a day  buDESOnide 160 MICROgram(s)/formoterol 4.5 MICROgram(s) Inhaler 2 Puff(s) Inhalation two times a day  calcium carbonate  625 mG + Vitamin D (OsCal 250 + D) 2 Tablet(s) Oral three times a day  clobetasol 0.05% Ointment 1 Application(s) Topical two times a day  Dakins Solution - 1/4 Strength 1 Application(s) Topical two times a day  folic acid 1 milliGRAM(s) Oral daily  gabapentin 300 milliGRAM(s) Oral three times a day  loratadine 10 milliGRAM(s) Oral daily  melatonin 3 milliGRAM(s) Oral at bedtime  metoprolol tartrate 12.5 milliGRAM(s) Oral two times a day  minocycline 100 milliGRAM(s) Oral two times a day  multivitamin 1 Tablet(s) Oral daily  pantoprazole    Tablet 40 milliGRAM(s) Oral before breakfast  polyethylene glycol 3350 17 Gram(s) Oral two times a day  predniSONE   Tablet 50 milliGRAM(s) Oral daily  simvastatin 20 milliGRAM(s) Oral at bedtime  tiotropium 18 MICROgram(s) Capsule 1 Capsule(s) Inhalation daily  traMADol 25 milliGRAM(s) Oral once    MEDICATIONS  (PRN):  acetaminophen   Tablet. 650 milliGRAM(s) Oral every 6 hours PRN Mild Pain (1 - 3)  bisacodyl Suppository 10 milliGRAM(s) Rectal daily PRN Constipation  bismuth subsalicylate Liquid 30 milliLiter(s) Oral every 6 hours PRN Cramping/abd pain  HYDROmorphone   Tablet 2 milliGRAM(s) Oral every 12 hours PRN prior to dressing change/ 30mins before dressing change.  magnesium hydroxide Suspension 30 milliLiter(s) Oral daily PRN Constipation  senna 2 Tablet(s) Oral at bedtime PRN Constipation  simethicone 80 milliGRAM(s) Chew two times a day PRN Gas  traMADol 25 milliGRAM(s) Oral every 12 hours PRN Moderate Pain (4 - 6)      Care Discussed with Consultants/Other Providers [x] YES  [ ] NO    HEALTH ISSUES - PROBLEM Dx:  Urticaria: Urticaria  Joint infection: Joint infection  Other problems related to medical facilities and other health care: Other problems related to medical facilities and other health care  S/P TAVR (transcatheter aortic valve replacement): S/P TAVR (transcatheter aortic valve replacement)  Acute cystitis without hematuria: Acute cystitis without hematuria  Wound infection: Wound infection  Acute appendicitis, unspecified acute appendicitis type: Acute appendicitis, unspecified acute appendicitis type  Fever, unspecified fever cause: Fever, unspecified fever cause

## 2018-08-01 NOTE — PROGRESS NOTE ADULT - ASSESSMENT
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 with recent admission from (3/9/2018 to 6/6/2018) for TKR c/b joint infection s/p explantation with multiple wounds from rehab for evaluation of fever, lethargy, and nausea at rehab found to be febrile here with evidence of appendicitis on CT abdomen, non-operative candidate, now resolved      Multiple wounds Abdomen Bilateral thighs -no gross signs of infection    VAC to RLQ abdominal wound (white foam) and & Rt knee (black foam)  Rt anterior thigh- Breanne dressing  Rt lateral thigh- Dakins  Multiple smaller wounds- Aquacel Ag dressings- dressing order placed    Rt knee w/ skin dehiscence - per plastics and ortho  F/u Derm/ Rheum/ Hem  Abx per Medicine/ ID  Gen SUrg/ Plastics/ Ortho consults appreciated  con't offloading  con't Nuturition  con't Pericare  Con't per Medicine  F/u as outpatient in [facility] [address] [facility] [phone]  d/w Medicine & Plastics team & d/w attending  Janki Cramer PA-C [id number]  I spent  35 minutes w/ this pt of which more than 50% of the time was spent counseling & coordinating care of this pt.

## 2018-08-01 NOTE — PROGRESS NOTE ADULT - ASSESSMENT
Morbid Obesity  PMR  Failed RT TKA with initial strep infection followed by MRSA infected spacer  Spontaneous areas of fat necrosis, pyoderma gangrenosum vs. panniculitis as per Derm, improving with steroids  Fungal/AFB cultures remain negative   Afebrile  Mild leukocytosis likely steroid effect  Suggest:  Continue chronic suppressive Minocycline for R knee spacer MRSA infection  Steroid as per derm  Local wound care, VAC  plastics surgery assessment noted and appreciated, wound vac change today  d/w Dr Marrero Morbid Obesity  PMR  Failed RT TKA with initial strep infection followed by MRSA infected spacer  Spontaneous areas of fat necrosis, pyoderma gangrenosum vs. panniculitis as per Derm, improving with steroids  Fungal/AFB cultures remain negative   Afebrile  Mild leukocytosis likely steroid effect  Suggest:  Continue chronic suppressive Minocycline for R knee spacer MRSA infection  Steroid as per derm  Local wound care, VAC  plastics surgery f/u, wound vac change today  d/w Dr Marrero

## 2018-08-01 NOTE — PROGRESS NOTE ADULT - SUBJECTIVE AND OBJECTIVE BOX
CC: f/u for MRSA knee infection and multiple wounds    Patient remains alert; denies pain    REVIEW OF SYSTEMS:  All other review of systems negative (Comprehensive ROS)    Antimicrobials:  long term minocycline 100 milliGRAM(s) Oral two times a day  Medications Reviewed    Vital Signs Last 24 Hrs  T(C): 36.7 (31 Jul 2018 21:25), Max: 36.7 (31 Jul 2018 10:44)  T(F): 98.1 (31 Jul 2018 21:25), Max: 98.1 (31 Jul 2018 21:25)  HR: 69 (31 Jul 2018 21:25) (59 - 69)  BP: 142/82 (31 Jul 2018 21:25) (131/83 - 145/84)  BP(mean): --  RR: 18 (31 Jul 2018 21:25) (18 - 18)  SpO2: 96% (31 Jul 2018 21:25) (94% - 97%)    PHYSICAL EXAM:  General: alert, no acute distress  Eyes:  anicteric, no conjunctival injection, no discharge  Oropharynx: no lesions or injection 	  Neck: supple, without adenopathy  Lungs: clear to auscultation  Heart: regular rate and rhythm; no murmur, rubs or gallops  Abdomen: soft, nondistended, nontender, wound vac in place  Skin: multiple lesions as described   Extremities:  dependent edema; R knee VAC- in place, no surrounding erythema noted  Neurologic: alert, moves all extremities    LAB RESULTS:               MICROBIOLOGY:  RECENT CULTURES REVIEWED    RADIOLOGY REVIEWED CC: f/u for MRSA knee infection and multiple wounds    Patient remains alert; denies pain, resting comfortably in bed eating breakfast    REVIEW OF SYSTEMS:  All other review of systems negative (Comprehensive ROS)    Antimicrobials:  long term minocycline 100 milliGRAM(s) Oral two times a day  Medications Reviewed    Vital Signs Last 24 Hrs  T(C): 36.7 (31 Jul 2018 21:25), Max: 36.7 (31 Jul 2018 10:44)  T(F): 98.1 (31 Jul 2018 21:25), Max: 98.1 (31 Jul 2018 21:25)  HR: 69 (31 Jul 2018 21:25) (59 - 69)  BP: 142/82 (31 Jul 2018 21:25) (131/83 - 145/84)  BP(mean): --  RR: 18 (31 Jul 2018 21:25) (18 - 18)  SpO2: 96% (31 Jul 2018 21:25) (94% - 97%)    PHYSICAL EXAM:  General: alert, no acute distress  Eyes:  anicteric, no conjunctival injection, no discharge  Oropharynx: no lesions or injection 	  Neck: supple, without adenopathy  Lungs: clear to auscultation  Heart: regular rate and rhythm; no murmur, rubs or gallops  Abdomen: soft, nondistended, nontender, wound vac in place  Skin: multiple lesions as described   Extremities:  dependent edema; R knee VAC- in place, no surrounding erythema noted  Neurologic: alert, moves all extremities    LAB RESULTS:               MICROBIOLOGY:  RECENT CULTURES REVIEWED    RADIOLOGY REVIEWED

## 2018-08-01 NOTE — PROGRESS NOTE ADULT - SUBJECTIVE AND OBJECTIVE BOX
SUBJECTIVE: Pt seen, chart reviewed.    Pt's HHA at bedside.  Pt was premedicated.   Improving pain-- pain when wounds open to air but dissipated when covered/redressed    Pt seen w/Plastics   Pt overall improving otherwise- dramatic improvement noted since restart of steroids  Derm ok'ed restarting steroids 2/2 last path bx results, suggestive but not definitive for PG but still no definitive dx for ulcers.    f/u w/ rheum/ derm/ maybe hem/onc  spontaneous necrotic hemorraghic wounds w/ fat necrosis    Pt wants to go home- and walk-  partial wgt bearing RLE-  Discussed again w/ pt  importance of getting oob to chair & doing daily exercises & not refusing PT    No odor, redness, warmth, f/c/s noted  drainage managed by dressings	    ROS skin / msk see HPI   all other systems negative    aspirin enteric coated 81 milliGRAM(s) Oral daily  minocycline 100 milliGRAM(s) Oral two times a day      Physical Exam:  Vital Signs Last 24 Hrs  T(C): 36.7 (01 Aug 2018 11:23), Max: 36.7 (31 Jul 2018 14:47)  T(F): 98 (01 Aug 2018 11:23), Max: 98.1 (31 Jul 2018 21:25)  HR: 74 (01 Aug 2018 11:23) (61 - 74)  BP: 142/82 (01 Aug 2018 11:23) (131/83 - 142/82)  BP(mean): --  RR: 18 (01 Aug 2018 11:23) (18 - 18)  SpO2: 96% (01 Aug 2018 11:23) (94% - 97%)  General Appearance:    NAD /  A&Ox3  MO/ frail/ WG  Envella    Musculoskeletal/Vascular:  BLE edema  BLE equally warm no acute ischemia noted  no deformities/ contractures  Rt knee wound per plastics    Skin:  moist, atrophic, frail,  ecchymosis w/o hematoma    pt is tender when just gently touching skin- therefore pt was premedicated prior to assessment    Multiple wounds- see measurements in A&I sections- placed by wound PT    RLQ abdomen wound w/ moist & granular tissue no odor, kimber- nearly healed- white foam     Rt anterior thigh wound w/ moist & granular tissue - kimber- nearly healed  Rt superior posterolateral w/ new upper thigh 2 small wounds opened w/ serosanguinous drainage w/ increased granular tissue w/ less fibrinous exudate & slough  Rt inferior posterolateral wound   increased moist & granular tissue w/ necrotic tissue medially beginning to lift  (+) serosanguinous drainage & tender w/o odor  No erythema, increased warmth, induration, fluctuance    Lt upper lateral thigh wound 3cm x1.5cm x 1.0cm  full thickness ulcers w/ moist & granular tissue   (+)serosanguinous  drainage  (+)tender w/o malodor  No erythema, increased warmth, induration, fluctuance    Lt lateral posterior thigh (inferior) superficial w/ 1 small partial thickness- nearly healed   0.5cm x 0.5cm x 0.1cm    Lt  Upper medial inner thigh   2 wounds/ 2 full thickness - 3rd healed  4cm x 2cm x 1.5cm & 1cm x 1cm x 0.3cm  moist &granular w/ scant fibrinous exudate  (+)serosanguinous drainage  (+)tender w/o odor  No erythema, increased warmth, induration, fluctuance          LABS:    PT/INR - ( 01 Aug 2018 06:35 )   PT: 32.7 sec;   INR: 2.96 ratio     PTT - ( 31 Jul 2018 08:28 )  PTT:35.2 sec

## 2018-08-02 LAB
APTT BLD: 41.4 SEC — HIGH (ref 27.5–37.4)
INR BLD: 3.77 RATIO — HIGH (ref 0.88–1.16)
PROTHROM AB SERPL-ACNC: 41.8 SEC — HIGH (ref 9.8–12.7)

## 2018-08-02 PROCEDURE — 99232 SBSQ HOSP IP/OBS MODERATE 35: CPT

## 2018-08-02 RX ADMIN — BUDESONIDE AND FORMOTEROL FUMARATE DIHYDRATE 2 PUFF(S): 160; 4.5 AEROSOL RESPIRATORY (INHALATION) at 18:53

## 2018-08-02 RX ADMIN — Medication 1 APPLICATION(S): at 22:34

## 2018-08-02 RX ADMIN — GABAPENTIN 300 MILLIGRAM(S): 400 CAPSULE ORAL at 06:02

## 2018-08-02 RX ADMIN — Medication 1 APPLICATION(S): at 06:02

## 2018-08-02 RX ADMIN — Medication 1 APPLICATION(S): at 18:43

## 2018-08-02 RX ADMIN — TRAMADOL HYDROCHLORIDE 25 MILLIGRAM(S): 50 TABLET ORAL at 21:48

## 2018-08-02 RX ADMIN — Medication 2 TABLET(S): at 22:22

## 2018-08-02 RX ADMIN — Medication 12.5 MILLIGRAM(S): at 06:02

## 2018-08-02 RX ADMIN — PANTOPRAZOLE SODIUM 40 MILLIGRAM(S): 20 TABLET, DELAYED RELEASE ORAL at 06:02

## 2018-08-02 RX ADMIN — Medication 500 MILLIGRAM(S): at 13:31

## 2018-08-02 RX ADMIN — HYDROMORPHONE HYDROCHLORIDE 2 MILLIGRAM(S): 2 INJECTION INTRAMUSCULAR; INTRAVENOUS; SUBCUTANEOUS at 11:06

## 2018-08-02 RX ADMIN — Medication 81 MILLIGRAM(S): at 13:31

## 2018-08-02 RX ADMIN — Medication 1 APPLICATION(S): at 22:23

## 2018-08-02 RX ADMIN — Medication 1 APPLICATION(S): at 11:09

## 2018-08-02 RX ADMIN — Medication 1 MILLIGRAM(S): at 13:31

## 2018-08-02 RX ADMIN — MINOCYCLINE HYDROCHLORIDE 100 MILLIGRAM(S): 45 TABLET, EXTENDED RELEASE ORAL at 18:41

## 2018-08-02 RX ADMIN — GABAPENTIN 300 MILLIGRAM(S): 400 CAPSULE ORAL at 13:32

## 2018-08-02 RX ADMIN — TRAMADOL HYDROCHLORIDE 25 MILLIGRAM(S): 50 TABLET ORAL at 06:25

## 2018-08-02 RX ADMIN — LORATADINE 10 MILLIGRAM(S): 10 TABLET ORAL at 13:31

## 2018-08-02 RX ADMIN — TRAMADOL HYDROCHLORIDE 25 MILLIGRAM(S): 50 TABLET ORAL at 07:30

## 2018-08-02 RX ADMIN — Medication 650 MILLIGRAM(S): at 00:08

## 2018-08-02 RX ADMIN — Medication 12.5 MILLIGRAM(S): at 18:40

## 2018-08-02 RX ADMIN — Medication 2 TABLET(S): at 06:02

## 2018-08-02 RX ADMIN — MINOCYCLINE HYDROCHLORIDE 100 MILLIGRAM(S): 45 TABLET, EXTENDED RELEASE ORAL at 06:02

## 2018-08-02 RX ADMIN — GABAPENTIN 300 MILLIGRAM(S): 400 CAPSULE ORAL at 22:22

## 2018-08-02 RX ADMIN — TIOTROPIUM BROMIDE 1 CAPSULE(S): 18 CAPSULE ORAL; RESPIRATORY (INHALATION) at 13:32

## 2018-08-02 RX ADMIN — Medication 50 MILLIGRAM(S): at 13:33

## 2018-08-02 RX ADMIN — Medication 3 MILLIGRAM(S): at 22:34

## 2018-08-02 RX ADMIN — SIMVASTATIN 20 MILLIGRAM(S): 20 TABLET, FILM COATED ORAL at 22:22

## 2018-08-02 RX ADMIN — POLYETHYLENE GLYCOL 3350 17 GRAM(S): 17 POWDER, FOR SOLUTION ORAL at 18:41

## 2018-08-02 RX ADMIN — BUDESONIDE AND FORMOTEROL FUMARATE DIHYDRATE 2 PUFF(S): 160; 4.5 AEROSOL RESPIRATORY (INHALATION) at 06:03

## 2018-08-02 RX ADMIN — Medication 1 TABLET(S): at 13:31

## 2018-08-02 RX ADMIN — Medication 2 TABLET(S): at 13:33

## 2018-08-02 NOTE — PROGRESS NOTE ADULT - SUBJECTIVE AND OBJECTIVE BOX
Patient is a 77y old  Female who presents with a chief complaint of fever, lethargy (07 Jun 2018 22:19)      SUBJECTIVE / OVERNIGHT EVENTS:    Patient seen and examined. denies cp sob  n/v/d. has been off IV pain medications. states she has been out of bed to chair for past 2 days.      Vital Signs Last 24 Hrs  T(C): 36.3 (02 Aug 2018 07:06), Max: 36.8 (01 Aug 2018 22:25)  T(F): 97.3 (02 Aug 2018 07:06), Max: 98.2 (01 Aug 2018 22:25)  HR: 60 (02 Aug 2018 07:06) (60 - 74)  BP: 148/87 (02 Aug 2018 07:06) (139/93 - 150/81)  BP(mean): --  RR: 20 (02 Aug 2018 07:06) (18 - 20)  SpO2: 98% (02 Aug 2018 07:06) (96% - 98%)  I&O's Summary      PE:  GENERAL: NAD, AAOx3, obese  HEAD:  Atraumatic, Normocephalic  EYES: EOMI, PERRLA, conjunctiva and sclera clear  NECK: Supple, No JVD  CHEST/LUNG: CTABL, No wheeze  HEART: Regular rate and rhythm; no murmur  ABDOMEN: Soft, Nontender, Nondistended; Bowel sounds present, wound vac on abd  EXTREMITIES:  2+ Peripheral Pulses, No clubbing, cyanosis, or edema, right knee wound vac  SKIN: right lateral and medial thigh with dressing, left medial thigh dressing  NEURO: No focal deficits    LABS:          PT/INR - ( 02 Aug 2018 07:14 )   PT: 41.8 sec;   INR: 3.77 ratio         PTT - ( 02 Aug 2018 07:14 )  PTT:41.4 sec  CAPILLARY BLOOD GLUCOSE                RADIOLOGY & ADDITIONAL TESTS:    Imaging Personally Reviewed:  [x] YES  [ ] NO    Consultant(s) Notes Reviewed:  [x] YES  [ ] NO    MEDICATIONS  (STANDING):  ascorbic acid 500 milliGRAM(s) Oral daily  aspirin enteric coated 81 milliGRAM(s) Oral daily  betamethasone valerate 0.1% Ointment 1 Application(s) Topical two times a day  buDESOnide 160 MICROgram(s)/formoterol 4.5 MICROgram(s) Inhaler 2 Puff(s) Inhalation two times a day  calcium carbonate  625 mG + Vitamin D (OsCal 250 + D) 2 Tablet(s) Oral three times a day  clobetasol 0.05% Ointment 1 Application(s) Topical two times a day  Dakins Solution - 1/4 Strength 1 Application(s) Topical two times a day  folic acid 1 milliGRAM(s) Oral daily  gabapentin 300 milliGRAM(s) Oral three times a day  loratadine 10 milliGRAM(s) Oral daily  melatonin 3 milliGRAM(s) Oral at bedtime  metoprolol tartrate 12.5 milliGRAM(s) Oral two times a day  minocycline 100 milliGRAM(s) Oral two times a day  multivitamin 1 Tablet(s) Oral daily  pantoprazole    Tablet 40 milliGRAM(s) Oral before breakfast  polyethylene glycol 3350 17 Gram(s) Oral two times a day  predniSONE   Tablet 50 milliGRAM(s) Oral daily  simvastatin 20 milliGRAM(s) Oral at bedtime  tiotropium 18 MICROgram(s) Capsule 1 Capsule(s) Inhalation daily  traMADol 25 milliGRAM(s) Oral once    MEDICATIONS  (PRN):  acetaminophen   Tablet. 650 milliGRAM(s) Oral every 6 hours PRN Mild Pain (1 - 3)  bisacodyl Suppository 10 milliGRAM(s) Rectal daily PRN Constipation  bismuth subsalicylate Liquid 30 milliLiter(s) Oral every 6 hours PRN Cramping/abd pain  HYDROmorphone   Tablet 2 milliGRAM(s) Oral every 12 hours PRN prior to dressing change/ 30mins before dressing change.  magnesium hydroxide Suspension 30 milliLiter(s) Oral daily PRN Constipation  senna 2 Tablet(s) Oral at bedtime PRN Constipation  simethicone 80 milliGRAM(s) Chew two times a day PRN Gas  traMADol 25 milliGRAM(s) Oral every 12 hours PRN Moderate Pain (4 - 6)      Care Discussed with Consultants/Other Providers [x] YES  [ ] NO    HEALTH ISSUES - PROBLEM Dx:  Urticaria: Urticaria  Joint infection: Joint infection  Other problems related to medical facilities and other health care: Other problems related to medical facilities and other health care  S/P TAVR (transcatheter aortic valve replacement): S/P TAVR (transcatheter aortic valve replacement)  Acute cystitis without hematuria: Acute cystitis without hematuria  Wound infection: Wound infection  Acute appendicitis, unspecified acute appendicitis type: Acute appendicitis, unspecified acute appendicitis type  Fever, unspecified fever cause: Fever, unspecified fever cause

## 2018-08-02 NOTE — PROGRESS NOTE ADULT - SUBJECTIVE AND OBJECTIVE BOX
pain controlled, no issues    afvss  nad  abdominal wound - vac  RLE  thigh - dressing per wound care teams, lateral wound wet to dry per prs/wc teams  knee wound with vac  remainder of wounds dressed by wound care  5/5 ta/ehl/gcs  silt l4-s1  2+ dp pulse    ROS: depressed

## 2018-08-02 NOTE — PROGRESS NOTE ADULT - ASSESSMENT
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 Pt completed 10 days meropenem.     # Abd wound, multiple bl thigh wounds  wounds seems to be improving on steroids started 8/18  working diagnosis likely pyoderma gangrenosum vs panniculitis, despite negative biopsies  (sp punch biopsy by derm 7/11, positive for urticaria, not consistent with PG)  prior biopsies were not consistent with infection/vasculitis/malignancy   pt continued to develop new ulcers, pathergic response to biopsy sites  tissue culture grew coag neg staph likely colonized, swab for afb and fungal cx - neg  continue vac to abdomen, rt Knee  appreciate wound care recs, daily dressing changes, wound vac changes every other day  heme did not feel coumadin necrosis likely given chronic use of coumadin    # Pain control  cont dilaudid 2mg po bid w dressing changes  tramadol prn for pain- pt tolerating well  tylenol po prn  avoid IV dilaudid/IV pain meds  gabapentin 300mg tid  Xanax 0.25 prior to dressing change  bowel regimen    # polymyalgia rheumatica  no signs of active rheum dz, evaluated by rheum 6/29    # TKR wound-mrsa  chronic minocycline suppression    # chr Anemia  stable, monitor h/h closely  appreciate heme recs    # S/P TAVR (transcatheter aortic valve replacement)  hold coumadin today, INR supratherapeutic    # adjustment disorder with depressed mood  xanax prn    dispo:   wounds improving with steroids  CM following for rehab placement,  only agrees to discharge to [facility]  has not required IV pain medication >48 hrs

## 2018-08-02 NOTE — PROGRESS NOTE ADULT - ASSESSMENT
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

## 2018-08-03 LAB
APTT BLD: 42.4 SEC — HIGH (ref 27.5–37.4)
INR BLD: 4.08 RATIO — HIGH (ref 0.88–1.16)
PROTHROM AB SERPL-ACNC: 47.4 SEC — HIGH (ref 10–13.1)

## 2018-08-03 RX ORDER — HYDROMORPHONE HYDROCHLORIDE 2 MG/ML
2 INJECTION INTRAMUSCULAR; INTRAVENOUS; SUBCUTANEOUS ONCE
Qty: 0 | Refills: 0 | Status: DISCONTINUED | OUTPATIENT
Start: 2018-08-03 | End: 2018-08-03

## 2018-08-03 RX ORDER — ACETAMINOPHEN 500 MG
1000 TABLET ORAL ONCE
Qty: 0 | Refills: 0 | Status: DISCONTINUED | OUTPATIENT
Start: 2018-08-03 | End: 2018-08-16

## 2018-08-03 RX ADMIN — BUDESONIDE AND FORMOTEROL FUMARATE DIHYDRATE 2 PUFF(S): 160; 4.5 AEROSOL RESPIRATORY (INHALATION) at 06:07

## 2018-08-03 RX ADMIN — Medication 1 APPLICATION(S): at 13:25

## 2018-08-03 RX ADMIN — PANTOPRAZOLE SODIUM 40 MILLIGRAM(S): 20 TABLET, DELAYED RELEASE ORAL at 06:08

## 2018-08-03 RX ADMIN — HYDROMORPHONE HYDROCHLORIDE 2 MILLIGRAM(S): 2 INJECTION INTRAMUSCULAR; INTRAVENOUS; SUBCUTANEOUS at 07:42

## 2018-08-03 RX ADMIN — Medication 81 MILLIGRAM(S): at 13:18

## 2018-08-03 RX ADMIN — Medication 12.5 MILLIGRAM(S): at 06:08

## 2018-08-03 RX ADMIN — BUDESONIDE AND FORMOTEROL FUMARATE DIHYDRATE 2 PUFF(S): 160; 4.5 AEROSOL RESPIRATORY (INHALATION) at 18:25

## 2018-08-03 RX ADMIN — MINOCYCLINE HYDROCHLORIDE 100 MILLIGRAM(S): 45 TABLET, EXTENDED RELEASE ORAL at 06:08

## 2018-08-03 RX ADMIN — POLYETHYLENE GLYCOL 3350 17 GRAM(S): 17 POWDER, FOR SOLUTION ORAL at 06:08

## 2018-08-03 RX ADMIN — Medication 1 APPLICATION(S): at 13:26

## 2018-08-03 RX ADMIN — Medication 500 MILLIGRAM(S): at 13:17

## 2018-08-03 RX ADMIN — Medication 12.5 MILLIGRAM(S): at 18:26

## 2018-08-03 RX ADMIN — Medication 1 TABLET(S): at 13:20

## 2018-08-03 RX ADMIN — Medication 2 TABLET(S): at 06:08

## 2018-08-03 RX ADMIN — HYDROMORPHONE HYDROCHLORIDE 2 MILLIGRAM(S): 2 INJECTION INTRAMUSCULAR; INTRAVENOUS; SUBCUTANEOUS at 10:46

## 2018-08-03 RX ADMIN — Medication 2 TABLET(S): at 21:07

## 2018-08-03 RX ADMIN — LORATADINE 10 MILLIGRAM(S): 10 TABLET ORAL at 13:19

## 2018-08-03 RX ADMIN — HYDROMORPHONE HYDROCHLORIDE 2 MILLIGRAM(S): 2 INJECTION INTRAMUSCULAR; INTRAVENOUS; SUBCUTANEOUS at 11:16

## 2018-08-03 RX ADMIN — Medication 3 MILLIGRAM(S): at 21:07

## 2018-08-03 RX ADMIN — GABAPENTIN 300 MILLIGRAM(S): 400 CAPSULE ORAL at 21:07

## 2018-08-03 RX ADMIN — HYDROMORPHONE HYDROCHLORIDE 2 MILLIGRAM(S): 2 INJECTION INTRAMUSCULAR; INTRAVENOUS; SUBCUTANEOUS at 21:14

## 2018-08-03 RX ADMIN — Medication 1 MILLIGRAM(S): at 13:18

## 2018-08-03 RX ADMIN — Medication 50 MILLIGRAM(S): at 13:20

## 2018-08-03 RX ADMIN — Medication 1 APPLICATION(S): at 18:29

## 2018-08-03 RX ADMIN — GABAPENTIN 300 MILLIGRAM(S): 400 CAPSULE ORAL at 06:08

## 2018-08-03 RX ADMIN — Medication 1 APPLICATION(S): at 21:08

## 2018-08-03 RX ADMIN — MINOCYCLINE HYDROCHLORIDE 100 MILLIGRAM(S): 45 TABLET, EXTENDED RELEASE ORAL at 18:26

## 2018-08-03 RX ADMIN — SIMVASTATIN 20 MILLIGRAM(S): 20 TABLET, FILM COATED ORAL at 21:07

## 2018-08-03 RX ADMIN — TIOTROPIUM BROMIDE 1 CAPSULE(S): 18 CAPSULE ORAL; RESPIRATORY (INHALATION) at 13:20

## 2018-08-03 RX ADMIN — HYDROMORPHONE HYDROCHLORIDE 2 MILLIGRAM(S): 2 INJECTION INTRAMUSCULAR; INTRAVENOUS; SUBCUTANEOUS at 08:12

## 2018-08-03 RX ADMIN — Medication 2 TABLET(S): at 13:18

## 2018-08-03 RX ADMIN — Medication 1 APPLICATION(S): at 21:07

## 2018-08-03 RX ADMIN — GABAPENTIN 300 MILLIGRAM(S): 400 CAPSULE ORAL at 13:19

## 2018-08-03 RX ADMIN — Medication 1 APPLICATION(S): at 06:02

## 2018-08-03 NOTE — PROGRESS NOTE ADULT - SUBJECTIVE AND OBJECTIVE BOX
Plastic Surgery  Daily Progress Note     Subjective/24 Hour Events:  Patient seen and examined.   R knee wound & midline abdomen vac changed today, R medial thigh and R lateral thigh dressings changed today, with PT at bedside.   Vac and dressing changes extremely painful for patient.     Objective:    Allergies:  amoxicillin (Other)  IV Contrast (Flushing (Skin); Nausea)      Meds:   acetaminophen   Tablet. 650 milliGRAM(s) Oral every 6 hours PRN  acetaminophen  IVPB. 1000 milliGRAM(s) IV Intermittent once  ascorbic acid 500 milliGRAM(s) Oral daily  aspirin enteric coated 81 milliGRAM(s) Oral daily  betamethasone valerate 0.1% Ointment 1 Application(s) Topical two times a day  bisacodyl Suppository 10 milliGRAM(s) Rectal daily PRN  bismuth subsalicylate Liquid 30 milliLiter(s) Oral every 6 hours PRN  buDESOnide 160 MICROgram(s)/formoterol 4.5 MICROgram(s) Inhaler 2 Puff(s) Inhalation two times a day  calcium carbonate  625 mG + Vitamin D (OsCal 250 + D) 2 Tablet(s) Oral three times a day  clobetasol 0.05% Ointment 1 Application(s) Topical two times a day  Dakins Solution - 1/4 Strength 1 Application(s) Topical two times a day  folic acid 1 milliGRAM(s) Oral daily  gabapentin 300 milliGRAM(s) Oral three times a day  HYDROmorphone   Tablet 2 milliGRAM(s) Oral every 12 hours PRN  loratadine 10 milliGRAM(s) Oral daily  magnesium hydroxide Suspension 30 milliLiter(s) Oral daily PRN  melatonin 3 milliGRAM(s) Oral at bedtime  metoprolol tartrate 12.5 milliGRAM(s) Oral two times a day  minocycline 100 milliGRAM(s) Oral two times a day  multivitamin 1 Tablet(s) Oral daily  pantoprazole    Tablet 40 milliGRAM(s) Oral before breakfast  polyethylene glycol 3350 17 Gram(s) Oral two times a day  predniSONE   Tablet 50 milliGRAM(s) Oral daily  senna 2 Tablet(s) Oral at bedtime PRN  simethicone 80 milliGRAM(s) Chew two times a day PRN  simvastatin 20 milliGRAM(s) Oral at bedtime  tiotropium 18 MICROgram(s) Capsule 1 Capsule(s) Inhalation daily  traMADol 25 milliGRAM(s) Oral every 12 hours PRN  traMADol 25 milliGRAM(s) Oral once      Vitals:  T(C): 36.7 (08-03-18 @ 08:40), Max: 36.7 (08-03-18 @ 08:40)  HR: 63 (08-03-18 @ 08:40) (60 - 67)  BP: 169/76 (08-03-18 @ 08:40) (127/77 - 169/85)  RR: 18 (08-03-18 @ 08:40) (18 - 19)  SpO2: 93% (08-03-18 @ 08:40) (93% - 98%)    I/O:     08-02-18 @ 07:01  -  08-03-18 @ 07:00  --------------------------------------------------------  IN: 850 mL / OUT: 0 mL / NET: 850 mL    08-03-18 @ 07:01  -  08-03-18 @ 12:04  --------------------------------------------------------  IN: 240 mL / OUT: 0 mL / NET: 240 mL        Physical Exam:   - General: Laying in bed, Alert, responsive.   - Abd: central abdominal vac changed, wound healing well, no purulence, erythema, swelling. R knee vac changed, wound healing without purulence, swelling, fluid collection. R medial thigh wound dressing changed, moderate gray filmy nonodorous discharge, very tender, nonodorous, with central 0.5x0.5cm white spot. R lateral thigh wounds dressing changed, two 2x2x3 cm superior wounds packed with Dakins, Large inferior wound with some light gray discharge and filmy green-black covering.       Labs:       Studies:

## 2018-08-03 NOTE — PROGRESS NOTE ADULT - ASSESSMENT
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 Pt completed 10 days meropenem.     # Abd wound, multiple bl thigh wounds  wounds improving on steroids started 8/18  appreciate derm recs  working diagnosis likely pyoderma gangrenosum vs panniculitis as steroid responsive, despite negative biopsies  (sp punch biopsy 7/11, positive for urticaria, not consistent with PG)  prior biopsies were not consistent with infection/vasculitis/malignancy   pt continued to develop new ulcers, pathergic response to biopsy sites  tissue culture grew coag neg staph likely colonized, swab for afb and fungal cx - neg  continue vac to abdomen, rt Knee  appreciate wound care recs  heme did not feel coumadin necrosis likely given chronic use of coumadin    # Pain control  cont dilaudid 2mg po bid w dressing changes  tramadol prn, tylenol po prn  avoid IV dilaudid/IV pain meds  gabapentin 300mg tid  Xanax 0.25 prior to dressing change  bowel regimen    # polymyalgia rheumatica  no signs of active rheum dz, evaluated by rheum 6/29    # TKR wound-mrsa  chronic minocycline suppression    # chr Anemia  stable, monitor h/h closely    # S/P TAVR (transcatheter aortic valve replacement)  hold coumadin today, INR supratherapeutic    # adjustment disorder with depressed mood  xanax prn    dispo:   wounds improving with steroids, likely to continue for extended duration  Mr Rdz had originally told Dr. Murphy he would accept discharge to [facility]  as dw Mr Rdz today [phone], he will not allow discharge to rehab until wounds are healed.  I explained that fully healed wounds could take months. I explained the increased risk of hospital acquired infections with prolonged hospitalizations that could lead to death. I explained the importance of aggressive PT only available at rehab as she has been bed bound for quite some time. HCP acknowledges this and continues to request hospitalization.

## 2018-08-03 NOTE — PROGRESS NOTE ADULT - ASSESSMENT
Assessment:   77F s/p Right total knee insertion of spacer, irrigation and debridement, complex wound closure 3/23; readmitted 6/8 for appendicitis, course has been complicated by multiple wounds most notably R knee, midline abdomen, R medial thigh, R lateral thigh, L medial thigh, L lateral thigh.     Plan:  -Prior to dressing change patient had received 2mg of Oral dilaudid, 5 hours prior, and then 2 mg Oral dilaudid immediately prior. She was unable to tolerate dressing change without significant pain. Please consider increasing premedication dose or route (IV dilaudid) for next dressing change.  -Will continue vac/dressing changes M/W/F, next on 8/6.  -Remainder of wounds per wound care/derm.  -Okay for discharge from PRS perspective.  -Rest of management per primary team    [facility] Plastic Surgery Pager: [phone].

## 2018-08-03 NOTE — PROGRESS NOTE ADULT - SUBJECTIVE AND OBJECTIVE BOX
Patient is a 77y old  Female who presents with a chief complaint of fever, lethargy (07 Jun 2018 22:19)      SUBJECTIVE / OVERNIGHT EVENTS:    Patient seen and examined. in good spirits. denies complaints. denies cp sob nvd. pt states she wants to go home but that she is not ready to leave the hospital until her "medical issues are resolved."      Vital Signs Last 24 Hrs  T(C): 36.7 (03 Aug 2018 08:40), Max: 36.7 (03 Aug 2018 08:40)  T(F): 98 (03 Aug 2018 08:40), Max: 98 (03 Aug 2018 08:40)  HR: 63 (03 Aug 2018 08:40) (60 - 67)  BP: 169/76 (03 Aug 2018 08:40) (127/77 - 169/85)  BP(mean): --  RR: 18 (03 Aug 2018 08:40) (18 - 20)  SpO2: 93% (03 Aug 2018 08:40) (93% - 98%)  I&O's Summary    02 Aug 2018 07:01  -  03 Aug 2018 07:00  --------------------------------------------------------  IN: 850 mL / OUT: 0 mL / NET: 850 mL        PE:  GENERAL: NAD, AAOx3, obese  HEAD:  Atraumatic, Normocephalic  EYES: EOMI, PERRLA, conjunctiva and sclera clear  NECK: Supple, No JVD  CHEST/LUNG: CTABL, No wheeze  HEART: Regular rate and rhythm; nomurmur  ABDOMEN: Soft, Nontender, Nondistended; Bowel sounds present, + wound vac abd  EXTREMITIES:  2+ Peripheral Pulses, No clubbing, cyanosis, or edema, right knee wound vac  SKIN: right lateral thigh dressing, right medial thigh dressing, left medial thigh dressing  NEURO: No focal deficits    LABS:          PT/INR - ( 03 Aug 2018 09:04 )   PT: 47.4 sec;   INR: 4.08 ratio         PTT - ( 03 Aug 2018 09:04 )  PTT:42.4 sec  CAPILLARY BLOOD GLUCOSE                RADIOLOGY & ADDITIONAL TESTS:    Imaging Personally Reviewed:  [x] YES  [ ] NO    Consultant(s) Notes Reviewed:  [x] YES  [ ] NO    MEDICATIONS  (STANDING):  ascorbic acid 500 milliGRAM(s) Oral daily  aspirin enteric coated 81 milliGRAM(s) Oral daily  betamethasone valerate 0.1% Ointment 1 Application(s) Topical two times a day  buDESOnide 160 MICROgram(s)/formoterol 4.5 MICROgram(s) Inhaler 2 Puff(s) Inhalation two times a day  calcium carbonate  625 mG + Vitamin D (OsCal 250 + D) 2 Tablet(s) Oral three times a day  clobetasol 0.05% Ointment 1 Application(s) Topical two times a day  Dakins Solution - 1/4 Strength 1 Application(s) Topical two times a day  folic acid 1 milliGRAM(s) Oral daily  gabapentin 300 milliGRAM(s) Oral three times a day  loratadine 10 milliGRAM(s) Oral daily  melatonin 3 milliGRAM(s) Oral at bedtime  metoprolol tartrate 12.5 milliGRAM(s) Oral two times a day  minocycline 100 milliGRAM(s) Oral two times a day  multivitamin 1 Tablet(s) Oral daily  pantoprazole    Tablet 40 milliGRAM(s) Oral before breakfast  polyethylene glycol 3350 17 Gram(s) Oral two times a day  predniSONE   Tablet 50 milliGRAM(s) Oral daily  simvastatin 20 milliGRAM(s) Oral at bedtime  tiotropium 18 MICROgram(s) Capsule 1 Capsule(s) Inhalation daily  traMADol 25 milliGRAM(s) Oral once    MEDICATIONS  (PRN):  acetaminophen   Tablet. 650 milliGRAM(s) Oral every 6 hours PRN Mild Pain (1 - 3)  bisacodyl Suppository 10 milliGRAM(s) Rectal daily PRN Constipation  bismuth subsalicylate Liquid 30 milliLiter(s) Oral every 6 hours PRN Cramping/abd pain  HYDROmorphone   Tablet 2 milliGRAM(s) Oral every 12 hours PRN prior to dressing change/ 30mins before dressing change.  magnesium hydroxide Suspension 30 milliLiter(s) Oral daily PRN Constipation  senna 2 Tablet(s) Oral at bedtime PRN Constipation  simethicone 80 milliGRAM(s) Chew two times a day PRN Gas  traMADol 25 milliGRAM(s) Oral every 12 hours PRN Moderate Pain (4 - 6)      Care Discussed with Consultants/Other Providers [x] YES  [ ] NO    HEALTH ISSUES - PROBLEM Dx:  Urticaria: Urticaria  Joint infection: Joint infection  Other problems related to medical facilities and other health care: Other problems related to medical facilities and other health care  S/P TAVR (transcatheter aortic valve replacement): S/P TAVR (transcatheter aortic valve replacement)  Acute cystitis without hematuria: Acute cystitis without hematuria  Wound infection: Wound infection  Acute appendicitis, unspecified acute appendicitis type: Acute appendicitis, unspecified acute appendicitis type  Fever, unspecified fever cause: Fever, unspecified fever cause

## 2018-08-04 LAB
ANION GAP SERPL CALC-SCNC: 15 MMOL/L — SIGNIFICANT CHANGE UP (ref 5–17)
BUN SERPL-MCNC: 33 MG/DL — HIGH (ref 7–23)
CALCIUM SERPL-MCNC: 8.6 MG/DL — SIGNIFICANT CHANGE UP (ref 8.4–10.5)
CHLORIDE SERPL-SCNC: 101 MMOL/L — SIGNIFICANT CHANGE UP (ref 96–108)
CO2 SERPL-SCNC: 24 MMOL/L — SIGNIFICANT CHANGE UP (ref 22–31)
CREAT SERPL-MCNC: 0.6 MG/DL — SIGNIFICANT CHANGE UP (ref 0.5–1.3)
CULTURE RESULTS: SIGNIFICANT CHANGE UP
GLUCOSE SERPL-MCNC: 114 MG/DL — HIGH (ref 70–99)
HCT VFR BLD CALC: 32.8 % — LOW (ref 34.5–45)
HGB BLD-MCNC: 9.7 G/DL — LOW (ref 11.5–15.5)
INR BLD: 3.3 RATIO — HIGH (ref 0.88–1.16)
MCHC RBC-ENTMCNC: 25.5 PG — LOW (ref 27–34)
MCHC RBC-ENTMCNC: 29.6 GM/DL — LOW (ref 32–36)
MCV RBC AUTO: 86.1 FL — SIGNIFICANT CHANGE UP (ref 80–100)
PLATELET # BLD AUTO: 262 K/UL — SIGNIFICANT CHANGE UP (ref 150–400)
POTASSIUM SERPL-MCNC: 5 MMOL/L — SIGNIFICANT CHANGE UP (ref 3.5–5.3)
POTASSIUM SERPL-SCNC: 5 MMOL/L — SIGNIFICANT CHANGE UP (ref 3.5–5.3)
PROTHROM AB SERPL-ACNC: 36.5 SEC — HIGH (ref 9.8–12.7)
RBC # BLD: 3.81 M/UL — SIGNIFICANT CHANGE UP (ref 3.8–5.2)
RBC # FLD: 17.7 % — HIGH (ref 10.3–14.5)
SODIUM SERPL-SCNC: 140 MMOL/L — SIGNIFICANT CHANGE UP (ref 135–145)
SPECIMEN SOURCE: SIGNIFICANT CHANGE UP
WBC # BLD: 12.83 K/UL — HIGH (ref 3.8–10.5)
WBC # FLD AUTO: 12.83 K/UL — HIGH (ref 3.8–10.5)

## 2018-08-04 PROCEDURE — 73560 X-RAY EXAM OF KNEE 1 OR 2: CPT | Mod: 26,RT

## 2018-08-04 PROCEDURE — 99232 SBSQ HOSP IP/OBS MODERATE 35: CPT

## 2018-08-04 RX ORDER — HYDROMORPHONE HYDROCHLORIDE 2 MG/ML
2 INJECTION INTRAMUSCULAR; INTRAVENOUS; SUBCUTANEOUS ONCE
Qty: 0 | Refills: 0 | Status: DISCONTINUED | OUTPATIENT
Start: 2018-08-04 | End: 2018-08-04

## 2018-08-04 RX ADMIN — Medication 2 TABLET(S): at 21:05

## 2018-08-04 RX ADMIN — SIMVASTATIN 20 MILLIGRAM(S): 20 TABLET, FILM COATED ORAL at 21:04

## 2018-08-04 RX ADMIN — Medication 3 MILLIGRAM(S): at 21:04

## 2018-08-04 RX ADMIN — Medication 12.5 MILLIGRAM(S): at 18:21

## 2018-08-04 RX ADMIN — PANTOPRAZOLE SODIUM 40 MILLIGRAM(S): 20 TABLET, DELAYED RELEASE ORAL at 06:00

## 2018-08-04 RX ADMIN — GABAPENTIN 300 MILLIGRAM(S): 400 CAPSULE ORAL at 21:04

## 2018-08-04 RX ADMIN — Medication 1 APPLICATION(S): at 18:21

## 2018-08-04 RX ADMIN — Medication 1 APPLICATION(S): at 21:04

## 2018-08-04 RX ADMIN — Medication 1 APPLICATION(S): at 05:59

## 2018-08-04 RX ADMIN — Medication 1 APPLICATION(S): at 21:05

## 2018-08-04 RX ADMIN — BUDESONIDE AND FORMOTEROL FUMARATE DIHYDRATE 2 PUFF(S): 160; 4.5 AEROSOL RESPIRATORY (INHALATION) at 18:18

## 2018-08-04 RX ADMIN — HYDROMORPHONE HYDROCHLORIDE 2 MILLIGRAM(S): 2 INJECTION INTRAMUSCULAR; INTRAVENOUS; SUBCUTANEOUS at 12:09

## 2018-08-04 RX ADMIN — Medication 81 MILLIGRAM(S): at 12:12

## 2018-08-04 RX ADMIN — GABAPENTIN 300 MILLIGRAM(S): 400 CAPSULE ORAL at 06:00

## 2018-08-04 RX ADMIN — Medication 500 MILLIGRAM(S): at 12:14

## 2018-08-04 RX ADMIN — HYDROMORPHONE HYDROCHLORIDE 2 MILLIGRAM(S): 2 INJECTION INTRAMUSCULAR; INTRAVENOUS; SUBCUTANEOUS at 13:09

## 2018-08-04 RX ADMIN — MINOCYCLINE HYDROCHLORIDE 100 MILLIGRAM(S): 45 TABLET, EXTENDED RELEASE ORAL at 05:59

## 2018-08-04 RX ADMIN — GABAPENTIN 300 MILLIGRAM(S): 400 CAPSULE ORAL at 14:13

## 2018-08-04 RX ADMIN — Medication 1 APPLICATION(S): at 12:16

## 2018-08-04 RX ADMIN — Medication 1 APPLICATION(S): at 12:14

## 2018-08-04 RX ADMIN — HYDROMORPHONE HYDROCHLORIDE 2 MILLIGRAM(S): 2 INJECTION INTRAMUSCULAR; INTRAVENOUS; SUBCUTANEOUS at 21:39

## 2018-08-04 RX ADMIN — TIOTROPIUM BROMIDE 1 CAPSULE(S): 18 CAPSULE ORAL; RESPIRATORY (INHALATION) at 12:15

## 2018-08-04 RX ADMIN — Medication 50 MILLIGRAM(S): at 12:13

## 2018-08-04 RX ADMIN — Medication 2 TABLET(S): at 14:13

## 2018-08-04 RX ADMIN — Medication 2 TABLET(S): at 05:59

## 2018-08-04 RX ADMIN — MINOCYCLINE HYDROCHLORIDE 100 MILLIGRAM(S): 45 TABLET, EXTENDED RELEASE ORAL at 18:21

## 2018-08-04 RX ADMIN — Medication 1 TABLET(S): at 12:14

## 2018-08-04 RX ADMIN — LORATADINE 10 MILLIGRAM(S): 10 TABLET ORAL at 12:14

## 2018-08-04 RX ADMIN — Medication 1 MILLIGRAM(S): at 12:15

## 2018-08-04 RX ADMIN — Medication 12.5 MILLIGRAM(S): at 06:00

## 2018-08-04 RX ADMIN — BUDESONIDE AND FORMOTEROL FUMARATE DIHYDRATE 2 PUFF(S): 160; 4.5 AEROSOL RESPIRATORY (INHALATION) at 05:59

## 2018-08-04 NOTE — PROGRESS NOTE ADULT - SUBJECTIVE AND OBJECTIVE BOX
Patient is a 77y old  Female who presents with a chief complaint of fever, lethargy (07 Jun 2018 22:19)      SUBJECTIVE / OVERNIGHT EVENTS:    Patient seen and examined. states she was depressed yesterday, crying, sad. but in better spirits today. does not want to talk to anyone. pt states she thinks she wants to leave next week. did not get out of bed yesterday as wound changes hurt.      Vital Signs Last 24 Hrs  T(C): 36.5 (04 Aug 2018 08:42), Max: 37 (03 Aug 2018 17:06)  T(F): 97.7 (04 Aug 2018 08:42), Max: 98.6 (03 Aug 2018 17:06)  HR: 61 (04 Aug 2018 08:42) (60 - 62)  BP: 149/84 (04 Aug 2018 08:42) (126/60 - 157/81)  BP(mean): --  RR: 18 (04 Aug 2018 08:42) (18 - 18)  SpO2: 98% (04 Aug 2018 08:42) (95% - 99%)  I&O's Summary    03 Aug 2018 07:01  -  04 Aug 2018 07:00  --------------------------------------------------------  IN: 240 mL / OUT: 0 mL / NET: 240 mL        PE:  GENERAL: NAD, AAOx3, obese  HEAD:  Atraumatic, Normocephalic  EYES: EOMI, PERRLA, conjunctiva and sclera clear  NECK: Supple, No JVD  CHEST/LUNG: CTABL, No wheeze  HEART: Regular rate and rhythm; nomurmur  ABDOMEN: Soft, Nontender, Nondistended; Bowel sounds present, + wound vac abd  EXTREMITIES:  2+ Peripheral Pulses, No clubbing, cyanosis, or edema, right knee wound vac  SKIN: right lateral thigh dressing, right medial thigh dressing, left medial thigh dressing  NEURO: No focal deficits    LABS:                        9.7    12.83 )-----------( 262      ( 04 Aug 2018 09:05 )             32.8     08-04    140  |  101  |  33<H>  ----------------------------<  114<H>  5.0   |  24  |  0.60    Ca    8.6      04 Aug 2018 07:16      PT/INR - ( 04 Aug 2018 07:16 )   PT: 36.5 sec;   INR: 3.30 ratio         PTT - ( 03 Aug 2018 09:04 )  PTT:42.4 sec  CAPILLARY BLOOD GLUCOSE                RADIOLOGY & ADDITIONAL TESTS:    Imaging Personally Reviewed:  [x] YES  [ ] NO    Consultant(s) Notes Reviewed:  [x] YES  [ ] NO    MEDICATIONS  (STANDING):  acetaminophen  IVPB. 1000 milliGRAM(s) IV Intermittent once  ascorbic acid 500 milliGRAM(s) Oral daily  aspirin enteric coated 81 milliGRAM(s) Oral daily  betamethasone valerate 0.1% Ointment 1 Application(s) Topical two times a day  buDESOnide 160 MICROgram(s)/formoterol 4.5 MICROgram(s) Inhaler 2 Puff(s) Inhalation two times a day  calcium carbonate  625 mG + Vitamin D (OsCal 250 + D) 2 Tablet(s) Oral three times a day  clobetasol 0.05% Ointment 1 Application(s) Topical two times a day  Dakins Solution - 1/4 Strength 1 Application(s) Topical two times a day  folic acid 1 milliGRAM(s) Oral daily  gabapentin 300 milliGRAM(s) Oral three times a day  loratadine 10 milliGRAM(s) Oral daily  melatonin 3 milliGRAM(s) Oral at bedtime  metoprolol tartrate 12.5 milliGRAM(s) Oral two times a day  minocycline 100 milliGRAM(s) Oral two times a day  multivitamin 1 Tablet(s) Oral daily  pantoprazole    Tablet 40 milliGRAM(s) Oral before breakfast  polyethylene glycol 3350 17 Gram(s) Oral two times a day  predniSONE   Tablet 50 milliGRAM(s) Oral daily  simvastatin 20 milliGRAM(s) Oral at bedtime  tiotropium 18 MICROgram(s) Capsule 1 Capsule(s) Inhalation daily  traMADol 25 milliGRAM(s) Oral once    MEDICATIONS  (PRN):  acetaminophen   Tablet. 650 milliGRAM(s) Oral every 6 hours PRN Mild Pain (1 - 3)  bisacodyl Suppository 10 milliGRAM(s) Rectal daily PRN Constipation  bismuth subsalicylate Liquid 30 milliLiter(s) Oral every 6 hours PRN Cramping/abd pain  HYDROmorphone   Tablet 2 milliGRAM(s) Oral every 12 hours PRN prior to dressing change/ 30mins before dressing change.  magnesium hydroxide Suspension 30 milliLiter(s) Oral daily PRN Constipation  senna 2 Tablet(s) Oral at bedtime PRN Constipation  simethicone 80 milliGRAM(s) Chew two times a day PRN Gas  traMADol 25 milliGRAM(s) Oral every 12 hours PRN Moderate Pain (4 - 6)      Care Discussed with Consultants/Other Providers [x] YES  [ ] NO    HEALTH ISSUES - PROBLEM Dx:  Urticaria: Urticaria  Joint infection: Joint infection  Other problems related to medical facilities and other health care: Other problems related to medical facilities and other health care  S/P TAVR (transcatheter aortic valve replacement): S/P TAVR (transcatheter aortic valve replacement)  Acute cystitis without hematuria: Acute cystitis without hematuria  Wound infection: Wound infection  Acute appendicitis, unspecified acute appendicitis type: Acute appendicitis, unspecified acute appendicitis type  Fever, unspecified fever cause: Fever, unspecified fever cause

## 2018-08-04 NOTE — PROGRESS NOTE ADULT - SUBJECTIVE AND OBJECTIVE BOX
pain controlled, no issues    afvss  nad  abdominal wound - vac  RLE  thigh - dressing per wound care teams, lateral wound wet to dry per prs/wc teams  knee wound with vac  min stable ecchymosis on anterior tibia, ttb in this area as well, no obvious deformity, no swelling  remainder of wounds dressed by wound care  5/5 ta/ehl/gcs  silt l4-s1  2+ dp pulse    ROS: depressed

## 2018-08-04 NOTE — PROGRESS NOTE ADULT - ASSESSMENT
no plan for orthopedic surgical intervention per family and patient preference  vac and wound care per PRS and wound care teams  per derm prednisone to treat possible atypical presentation of pyoderma granulosum, per report improving  PO abx per ID team, minocycline  encourage patient to spend majority of bed oob in bedside chair as able, has now been mobilizing to bedside chair, she must remain 50% wb on the RLE and NO KNEE MOTION allowed, knee immobilizer if oob  coumadin, inr goal 2-3  continue to optimize nutrition  continue to involve psych given depression symptoms		    *PLEASE OBTAIN NEW XR RLE (AP AND LATERAL) no plan for orthopedic surgical intervention per family and patient preference  vac and wound care per PRS and wound care teams  per derm prednisone to treat possible atypical presentation of pyoderma granulosum, per report improving  PO abx per ID team, minocycline  encourage patient to spend majority of bed oob in bedside chair as able, has now been mobilizing to bedside chair, she must remain 50% wb on the RLE and NO KNEE MOTION allowed, knee immobilizer if oob  coumadin, inr goal 2-3  continue to optimize nutrition  continue to involve psych given depression symptoms		    *PLEASE OBTAIN NEW XR RLE (AP AND LATERAL)    Mr. Rdz was visiting today and I spent 15 minutes meeting with him reviewing Ms. Rdz's progress and plans. She wants to go to rehab next week but he wants her to stay until the wounds are healed. They are hopeful to transition her to a wheelchair during this upcoming week so that she can spend some time outside her hospital room which they feel will be good for her mental health which I agree with. He expressed	 appreciation for her care at the end of the conversation.

## 2018-08-04 NOTE — PROGRESS NOTE ADULT - ASSESSMENT
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 Pt completed 10 days meropenem.     # Abd wound, multiple bl thigh wounds  wounds improving on steroids started 8/18  appreciate derm recs, would not change to steroid sparing agent as responding  working diagnosis likely pyoderma gangrenosum vs panniculitis as steroid responsive, despite negative biopsies  (sp punch biopsy 7/11, positive for urticaria, not consistent with PG)  prior biopsies were not consistent with infection/vasculitis/malignancy   pt continued to develop new ulcers, pathergic response to biopsy sites  tissue culture grew coag neg staph likely colonized, swab for afb and fungal cx - neg  continue vac to abdomen, rt Knee  appreciate wound care recs  heme did not feel coumadin necrosis likely given chronic use of coumadin    # Pain control  cont dilaudid 2mg po bid w dressing changes, previously was on 4mg and became lethargic so would not increase, avoiding IV pain medications  tramadol prn, tylenol po prn  gabapentin 300mg tid  Xanax 0.25 prior to dressing change  bowel regimen    # polymyalgia rheumatica  no signs of active rheum dz, evaluated by rheum 6/29    # TKR wound-mrsa  chronic minocycline suppression    # chr Anemia  stable, monitor h/h closely    # S/P TAVR (transcatheter aortic valve replacement)  hold coumadin today, INR supratherapeutic    # adjustment disorder with depressed mood  xanax prn    dispo:   wounds improving with steroids, likely to continue for extended duration  Mr Rdz had originally told Dr. Murphy he would accept discharge to [facility]  as dw Mr Rdz [phone], he will not allow discharge to rehab until wounds are healed.  patient aware fully healed wounds could take months. patient aware increased risk of hospital acquired infections with prolonged hospitalizations that could lead to death. discussed aggressive PT only available at rehab with patient.

## 2018-08-05 LAB
ANION GAP SERPL CALC-SCNC: 11 MMOL/L — SIGNIFICANT CHANGE UP (ref 5–17)
BUN SERPL-MCNC: 40 MG/DL — HIGH (ref 7–23)
CALCIUM SERPL-MCNC: 8.5 MG/DL — SIGNIFICANT CHANGE UP (ref 8.4–10.5)
CHLORIDE SERPL-SCNC: 102 MMOL/L — SIGNIFICANT CHANGE UP (ref 96–108)
CO2 SERPL-SCNC: 25 MMOL/L — SIGNIFICANT CHANGE UP (ref 22–31)
CREAT SERPL-MCNC: 0.64 MG/DL — SIGNIFICANT CHANGE UP (ref 0.5–1.3)
GLUCOSE SERPL-MCNC: 105 MG/DL — HIGH (ref 70–99)
HCT VFR BLD CALC: 32.4 % — LOW (ref 34.5–45)
HGB BLD-MCNC: 9.7 G/DL — LOW (ref 11.5–15.5)
INR BLD: 2.49 RATIO — HIGH (ref 0.88–1.16)
MCHC RBC-ENTMCNC: 25.7 PG — LOW (ref 27–34)
MCHC RBC-ENTMCNC: 29.9 GM/DL — LOW (ref 32–36)
MCV RBC AUTO: 85.9 FL — SIGNIFICANT CHANGE UP (ref 80–100)
PLATELET # BLD AUTO: 250 K/UL — SIGNIFICANT CHANGE UP (ref 150–400)
POTASSIUM SERPL-MCNC: 4.9 MMOL/L — SIGNIFICANT CHANGE UP (ref 3.5–5.3)
POTASSIUM SERPL-SCNC: 4.9 MMOL/L — SIGNIFICANT CHANGE UP (ref 3.5–5.3)
PROTHROM AB SERPL-ACNC: 27.7 SEC — HIGH (ref 9.8–12.7)
RBC # BLD: 3.77 M/UL — LOW (ref 3.8–5.2)
RBC # FLD: 17.8 % — HIGH (ref 10.3–14.5)
SODIUM SERPL-SCNC: 138 MMOL/L — SIGNIFICANT CHANGE UP (ref 135–145)
WBC # BLD: 13.02 K/UL — HIGH (ref 3.8–10.5)
WBC # FLD AUTO: 13.02 K/UL — HIGH (ref 3.8–10.5)

## 2018-08-05 PROCEDURE — 73560 X-RAY EXAM OF KNEE 1 OR 2: CPT | Mod: 26,RT

## 2018-08-05 RX ORDER — HYDROMORPHONE HYDROCHLORIDE 2 MG/ML
2 INJECTION INTRAMUSCULAR; INTRAVENOUS; SUBCUTANEOUS ONCE
Qty: 0 | Refills: 0 | Status: DISCONTINUED | OUTPATIENT
Start: 2018-08-05 | End: 2018-08-05

## 2018-08-05 RX ORDER — WARFARIN SODIUM 2.5 MG/1
1 TABLET ORAL ONCE
Qty: 0 | Refills: 0 | Status: COMPLETED | OUTPATIENT
Start: 2018-08-05 | End: 2018-08-05

## 2018-08-05 RX ADMIN — Medication 50 MILLIGRAM(S): at 11:39

## 2018-08-05 RX ADMIN — Medication 2 TABLET(S): at 13:34

## 2018-08-05 RX ADMIN — GABAPENTIN 300 MILLIGRAM(S): 400 CAPSULE ORAL at 13:35

## 2018-08-05 RX ADMIN — HYDROMORPHONE HYDROCHLORIDE 2 MILLIGRAM(S): 2 INJECTION INTRAMUSCULAR; INTRAVENOUS; SUBCUTANEOUS at 11:33

## 2018-08-05 RX ADMIN — Medication 12.5 MILLIGRAM(S): at 18:52

## 2018-08-05 RX ADMIN — Medication 1 APPLICATION(S): at 10:55

## 2018-08-05 RX ADMIN — LORATADINE 10 MILLIGRAM(S): 10 TABLET ORAL at 11:39

## 2018-08-05 RX ADMIN — MINOCYCLINE HYDROCHLORIDE 100 MILLIGRAM(S): 45 TABLET, EXTENDED RELEASE ORAL at 18:52

## 2018-08-05 RX ADMIN — Medication 1 APPLICATION(S): at 21:12

## 2018-08-05 RX ADMIN — TIOTROPIUM BROMIDE 1 CAPSULE(S): 18 CAPSULE ORAL; RESPIRATORY (INHALATION) at 11:38

## 2018-08-05 RX ADMIN — Medication 1 APPLICATION(S): at 10:54

## 2018-08-05 RX ADMIN — Medication 12.5 MILLIGRAM(S): at 06:00

## 2018-08-05 RX ADMIN — Medication 3 MILLIGRAM(S): at 21:12

## 2018-08-05 RX ADMIN — HYDROMORPHONE HYDROCHLORIDE 2 MILLIGRAM(S): 2 INJECTION INTRAMUSCULAR; INTRAVENOUS; SUBCUTANEOUS at 23:15

## 2018-08-05 RX ADMIN — GABAPENTIN 300 MILLIGRAM(S): 400 CAPSULE ORAL at 21:12

## 2018-08-05 RX ADMIN — HYDROMORPHONE HYDROCHLORIDE 2 MILLIGRAM(S): 2 INJECTION INTRAMUSCULAR; INTRAVENOUS; SUBCUTANEOUS at 21:12

## 2018-08-05 RX ADMIN — BUDESONIDE AND FORMOTEROL FUMARATE DIHYDRATE 2 PUFF(S): 160; 4.5 AEROSOL RESPIRATORY (INHALATION) at 06:03

## 2018-08-05 RX ADMIN — Medication 2 TABLET(S): at 06:00

## 2018-08-05 RX ADMIN — TRAMADOL HYDROCHLORIDE 25 MILLIGRAM(S): 50 TABLET ORAL at 23:17

## 2018-08-05 RX ADMIN — Medication 81 MILLIGRAM(S): at 11:39

## 2018-08-05 RX ADMIN — Medication 2 TABLET(S): at 21:12

## 2018-08-05 RX ADMIN — Medication 1 APPLICATION(S): at 18:52

## 2018-08-05 RX ADMIN — Medication 500 MILLIGRAM(S): at 11:39

## 2018-08-05 RX ADMIN — HYDROMORPHONE HYDROCHLORIDE 2 MILLIGRAM(S): 2 INJECTION INTRAMUSCULAR; INTRAVENOUS; SUBCUTANEOUS at 10:51

## 2018-08-05 RX ADMIN — MINOCYCLINE HYDROCHLORIDE 100 MILLIGRAM(S): 45 TABLET, EXTENDED RELEASE ORAL at 06:01

## 2018-08-05 RX ADMIN — PANTOPRAZOLE SODIUM 40 MILLIGRAM(S): 20 TABLET, DELAYED RELEASE ORAL at 06:01

## 2018-08-05 RX ADMIN — GABAPENTIN 300 MILLIGRAM(S): 400 CAPSULE ORAL at 06:00

## 2018-08-05 RX ADMIN — SIMVASTATIN 20 MILLIGRAM(S): 20 TABLET, FILM COATED ORAL at 21:12

## 2018-08-05 RX ADMIN — Medication 1 APPLICATION(S): at 06:02

## 2018-08-05 RX ADMIN — WARFARIN SODIUM 1 MILLIGRAM(S): 2.5 TABLET ORAL at 21:12

## 2018-08-05 RX ADMIN — Medication 1 MILLIGRAM(S): at 11:38

## 2018-08-05 RX ADMIN — Medication 1 TABLET(S): at 11:38

## 2018-08-05 RX ADMIN — BUDESONIDE AND FORMOTEROL FUMARATE DIHYDRATE 2 PUFF(S): 160; 4.5 AEROSOL RESPIRATORY (INHALATION) at 18:53

## 2018-08-05 NOTE — PROGRESS NOTE ADULT - ASSESSMENT
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 Pt completed 10 days meropenem.     # Abd wound, multiple bl thigh wounds  wounds improving on steroids started 8/18  appreciate derm recs, would not change to steroid sparing agent as responding  working diagnosis likely pyoderma gangrenosum vs panniculitis as steroid responsive, despite negative biopsies  sp punch biopsy 7/11, positive for urticaria, not consistent with PG  prior biopsies were not consistent with infection/vasculitis/malignancy   tissue culture grew coag neg staph likely colonized, swab for afb and fungal cx neg  continue vac to abdomen, rt Knee  appreciate wound care recs  heme did not feel coumadin necrosis likely given chronic use of coumadin    # Pain control  cont dilaudid 2mg po bid w dressing changes, previously was on 4mg and became lethargic so would not increase, avoiding IV pain medications  tramadol prn, tylenol po prn  gabapentin 300mg tid  Xanax 0.25 prior to dressing change  bowel regimen    # polymyalgia rheumatica  no signs of active rheum dz, evaluated by rheum 6/29    # TKR wound-mrsa  chronic minocycline suppression    # chr Anemia  stable, monitor h/h closely    # S/P TAVR (transcatheter aortic valve replacement)  daily pt/inr, daily coumadin dosing    # adjustment disorder with depressed mood  xanax prn    dispo:   wounds improving with steroids, likely to continue steroids for extended duration  discharge planning has been difficult   [place] told Dr. Murphy he would accept discharge to [facility] but now has refused discharge to rehab until wounds are healed. 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 Pt completed 10 days meropenem.     # Abd wound, multiple bl thigh wounds  wounds improving on steroids started 8/18  appreciate derm recs, would not change to steroid sparing agent as responding  leukocytosis likely 2/2 steroids  working diagnosis likely pyoderma gangrenosum vs panniculitis as steroid responsive, despite negative biopsies  sp punch biopsy 7/11, positive for urticaria, not consistent with PG  prior biopsies were not consistent with infection/vasculitis/malignancy   tissue culture grew coag neg staph likely colonized, swab for afb and fungal cx neg  continue vac to abdomen, rt Knee  appreciate wound care recs  heme did not feel coumadin necrosis likely given chronic use of coumadin    # Pain control  cont dilaudid 2mg po bid w dressing changes, previously was on 4mg and became lethargic so would not increase, avoiding IV pain medications  tramadol prn, tylenol po prn  gabapentin 300mg tid  Xanax 0.25 prior to dressing change  bowel regimen    # polymyalgia rheumatica  no signs of active rheum dz, evaluated by rheum 6/29    # TKR wound-mrsa  chronic minocycline suppression    # chr Anemia  stable, monitor h/h closely    # S/P TAVR (transcatheter aortic valve replacement)  daily pt/inr, daily coumadin dosing    # adjustment disorder with depressed mood  xanax prn    dispo:   wounds improving with steroids, likely to continue steroids for extended duration  discharge planning has been difficult  Mr Rdz told Dr. Murphy he would accept discharge to [facility] but now has refused discharge to rehab until wounds are healed.

## 2018-08-05 NOTE — PROGRESS NOTE ADULT - SUBJECTIVE AND OBJECTIVE BOX
Patient is a 77y old  Female who presents with a chief complaint of fever, lethargy (07 Jun 2018 22:19)      SUBJECTIVE / OVERNIGHT EVENTS:    Patient seen and examined. co fatigue today. denies cp sob.      Vital Signs Last 24 Hrs  T(C): 36.6 (05 Aug 2018 05:06), Max: 36.9 (04 Aug 2018 18:22)  T(F): 97.8 (05 Aug 2018 05:06), Max: 98.4 (04 Aug 2018 18:22)  HR: 60 (05 Aug 2018 05:06) (60 - 93)  BP: 149/80 (05 Aug 2018 05:06) (120/80 - 149/80)  BP(mean): --  RR: 18 (05 Aug 2018 05:06) (18 - 18)  SpO2: 99% (05 Aug 2018 05:06) (95% - 99%)  I&O's Summary    04 Aug 2018 07:01  -  05 Aug 2018 07:00  --------------------------------------------------------  IN: 240 mL / OUT: 0 mL / NET: 240 mL        PE:  GENERAL: NAD, AAOx3, obese  HEAD:  Atraumatic, Normocephalic  EYES: EOMI, PERRLA, conjunctiva and sclera clear  NECK: Supple, No JVD  CHEST/LUNG: CTABL, No wheeze  HEART: Regular rate and rhythm; nomurmur  ABDOMEN: Soft, Nontender, Nondistended; Bowel sounds present, + wound vac abd  EXTREMITIES:  2+ Peripheral Pulses, No clubbing, cyanosis, or edema, right knee wound vac  SKIN: right lateral thigh dressing, right medial thigh dressing, left medial thigh dressing  NEURO: No focal deficits    LABS:                        9.7    12.83 )-----------( 262      ( 04 Aug 2018 09:05 )             32.8     08-05    138  |  102  |  40<H>  ----------------------------<  105<H>  4.9   |  25  |  0.64    Ca    8.5      05 Aug 2018 07:17      PT/INR - ( 05 Aug 2018 07:17 )   PT: 27.7 sec;   INR: 2.49 ratio         PTT - ( 03 Aug 2018 09:04 )  PTT:42.4 sec  CAPILLARY BLOOD GLUCOSE                RADIOLOGY & ADDITIONAL TESTS:    Imaging Personally Reviewed:  [x] YES  [ ] NO    Consultant(s) Notes Reviewed:  [x] YES  [ ] NO    MEDICATIONS  (STANDING):  acetaminophen  IVPB. 1000 milliGRAM(s) IV Intermittent once  ascorbic acid 500 milliGRAM(s) Oral daily  aspirin enteric coated 81 milliGRAM(s) Oral daily  betamethasone valerate 0.1% Ointment 1 Application(s) Topical two times a day  buDESOnide 160 MICROgram(s)/formoterol 4.5 MICROgram(s) Inhaler 2 Puff(s) Inhalation two times a day  calcium carbonate  625 mG + Vitamin D (OsCal 250 + D) 2 Tablet(s) Oral three times a day  clobetasol 0.05% Ointment 1 Application(s) Topical two times a day  Dakins Solution - 1/4 Strength 1 Application(s) Topical two times a day  folic acid 1 milliGRAM(s) Oral daily  gabapentin 300 milliGRAM(s) Oral three times a day  loratadine 10 milliGRAM(s) Oral daily  melatonin 3 milliGRAM(s) Oral at bedtime  metoprolol tartrate 12.5 milliGRAM(s) Oral two times a day  minocycline 100 milliGRAM(s) Oral two times a day  multivitamin 1 Tablet(s) Oral daily  pantoprazole    Tablet 40 milliGRAM(s) Oral before breakfast  polyethylene glycol 3350 17 Gram(s) Oral two times a day  predniSONE   Tablet 50 milliGRAM(s) Oral daily  simvastatin 20 milliGRAM(s) Oral at bedtime  tiotropium 18 MICROgram(s) Capsule 1 Capsule(s) Inhalation daily  traMADol 25 milliGRAM(s) Oral once    MEDICATIONS  (PRN):  acetaminophen   Tablet. 650 milliGRAM(s) Oral every 6 hours PRN Mild Pain (1 - 3)  bisacodyl Suppository 10 milliGRAM(s) Rectal daily PRN Constipation  bismuth subsalicylate Liquid 30 milliLiter(s) Oral every 6 hours PRN Cramping/abd pain  HYDROmorphone   Tablet 2 milliGRAM(s) Oral every 12 hours PRN prior to dressing change/ 30mins before dressing change.  magnesium hydroxide Suspension 30 milliLiter(s) Oral daily PRN Constipation  senna 2 Tablet(s) Oral at bedtime PRN Constipation  simethicone 80 milliGRAM(s) Chew two times a day PRN Gas  traMADol 25 milliGRAM(s) Oral every 12 hours PRN Moderate Pain (4 - 6)      Care Discussed with Consultants/Other Providers [x] YES  [ ] NO    HEALTH ISSUES - PROBLEM Dx:  Urticaria: Urticaria  Joint infection: Joint infection  Other problems related to medical facilities and other health care: Other problems related to medical facilities and other health care  S/P TAVR (transcatheter aortic valve replacement): S/P TAVR (transcatheter aortic valve replacement)  Acute cystitis without hematuria: Acute cystitis without hematuria  Wound infection: Wound infection  Acute appendicitis, unspecified acute appendicitis type: Acute appendicitis, unspecified acute appendicitis type  Fever, unspecified fever cause: Fever, unspecified fever cause

## 2018-08-06 LAB
ANION GAP SERPL CALC-SCNC: 12 MMOL/L — SIGNIFICANT CHANGE UP (ref 5–17)
BUN SERPL-MCNC: 36 MG/DL — HIGH (ref 7–23)
CALCIUM SERPL-MCNC: 8.7 MG/DL — SIGNIFICANT CHANGE UP (ref 8.4–10.5)
CHLORIDE SERPL-SCNC: 101 MMOL/L — SIGNIFICANT CHANGE UP (ref 96–108)
CO2 SERPL-SCNC: 26 MMOL/L — SIGNIFICANT CHANGE UP (ref 22–31)
CREAT SERPL-MCNC: 0.65 MG/DL — SIGNIFICANT CHANGE UP (ref 0.5–1.3)
GLUCOSE SERPL-MCNC: 93 MG/DL — SIGNIFICANT CHANGE UP (ref 70–99)
HCT VFR BLD CALC: 32.4 % — LOW (ref 34.5–45)
HGB BLD-MCNC: 9.9 G/DL — LOW (ref 11.5–15.5)
INR BLD: 2.03 RATIO — HIGH (ref 0.88–1.16)
MCHC RBC-ENTMCNC: 26.6 PG — LOW (ref 27–34)
MCHC RBC-ENTMCNC: 30.6 GM/DL — LOW (ref 32–36)
MCV RBC AUTO: 87.1 FL — SIGNIFICANT CHANGE UP (ref 80–100)
PLATELET # BLD AUTO: 227 K/UL — SIGNIFICANT CHANGE UP (ref 150–400)
POTASSIUM SERPL-MCNC: 4.9 MMOL/L — SIGNIFICANT CHANGE UP (ref 3.5–5.3)
POTASSIUM SERPL-SCNC: 4.9 MMOL/L — SIGNIFICANT CHANGE UP (ref 3.5–5.3)
PROTHROM AB SERPL-ACNC: 22.5 SEC — HIGH (ref 9.8–12.7)
RBC # BLD: 3.72 M/UL — LOW (ref 3.8–5.2)
RBC # FLD: 17.8 % — HIGH (ref 10.3–14.5)
SODIUM SERPL-SCNC: 139 MMOL/L — SIGNIFICANT CHANGE UP (ref 135–145)
WBC # BLD: 12.33 K/UL — HIGH (ref 3.8–10.5)
WBC # FLD AUTO: 12.33 K/UL — HIGH (ref 3.8–10.5)

## 2018-08-06 PROCEDURE — 97606 NEG PRS WND THER DME>50 SQCM: CPT

## 2018-08-06 PROCEDURE — 99232 SBSQ HOSP IP/OBS MODERATE 35: CPT | Mod: 25

## 2018-08-06 RX ORDER — WARFARIN SODIUM 2.5 MG/1
1 TABLET ORAL ONCE
Qty: 0 | Refills: 0 | Status: COMPLETED | OUTPATIENT
Start: 2018-08-06 | End: 2018-08-06

## 2018-08-06 RX ADMIN — Medication 500 MILLIGRAM(S): at 13:29

## 2018-08-06 RX ADMIN — TRAMADOL HYDROCHLORIDE 25 MILLIGRAM(S): 50 TABLET ORAL at 00:15

## 2018-08-06 RX ADMIN — Medication 1 MILLIGRAM(S): at 13:29

## 2018-08-06 RX ADMIN — Medication 1 APPLICATION(S): at 17:29

## 2018-08-06 RX ADMIN — Medication 12.5 MILLIGRAM(S): at 06:49

## 2018-08-06 RX ADMIN — Medication 1 APPLICATION(S): at 21:39

## 2018-08-06 RX ADMIN — MINOCYCLINE HYDROCHLORIDE 100 MILLIGRAM(S): 45 TABLET, EXTENDED RELEASE ORAL at 18:40

## 2018-08-06 RX ADMIN — HYDROMORPHONE HYDROCHLORIDE 2 MILLIGRAM(S): 2 INJECTION INTRAMUSCULAR; INTRAVENOUS; SUBCUTANEOUS at 10:10

## 2018-08-06 RX ADMIN — TIOTROPIUM BROMIDE 1 CAPSULE(S): 18 CAPSULE ORAL; RESPIRATORY (INHALATION) at 13:31

## 2018-08-06 RX ADMIN — Medication 50 MILLIGRAM(S): at 13:30

## 2018-08-06 RX ADMIN — Medication 81 MILLIGRAM(S): at 13:29

## 2018-08-06 RX ADMIN — Medication 2 TABLET(S): at 06:49

## 2018-08-06 RX ADMIN — Medication 1 APPLICATION(S): at 12:13

## 2018-08-06 RX ADMIN — HYDROMORPHONE HYDROCHLORIDE 2 MILLIGRAM(S): 2 INJECTION INTRAMUSCULAR; INTRAVENOUS; SUBCUTANEOUS at 09:40

## 2018-08-06 RX ADMIN — Medication 3 MILLIGRAM(S): at 21:40

## 2018-08-06 RX ADMIN — TRAMADOL HYDROCHLORIDE 25 MILLIGRAM(S): 50 TABLET ORAL at 22:47

## 2018-08-06 RX ADMIN — GABAPENTIN 300 MILLIGRAM(S): 400 CAPSULE ORAL at 06:49

## 2018-08-06 RX ADMIN — LORATADINE 10 MILLIGRAM(S): 10 TABLET ORAL at 13:29

## 2018-08-06 RX ADMIN — Medication 2 TABLET(S): at 13:30

## 2018-08-06 RX ADMIN — MINOCYCLINE HYDROCHLORIDE 100 MILLIGRAM(S): 45 TABLET, EXTENDED RELEASE ORAL at 06:49

## 2018-08-06 RX ADMIN — GABAPENTIN 300 MILLIGRAM(S): 400 CAPSULE ORAL at 21:40

## 2018-08-06 RX ADMIN — BUDESONIDE AND FORMOTEROL FUMARATE DIHYDRATE 2 PUFF(S): 160; 4.5 AEROSOL RESPIRATORY (INHALATION) at 06:48

## 2018-08-06 RX ADMIN — Medication 1 TABLET(S): at 13:29

## 2018-08-06 RX ADMIN — WARFARIN SODIUM 1 MILLIGRAM(S): 2.5 TABLET ORAL at 21:39

## 2018-08-06 RX ADMIN — Medication 1 APPLICATION(S): at 06:48

## 2018-08-06 RX ADMIN — GABAPENTIN 300 MILLIGRAM(S): 400 CAPSULE ORAL at 13:31

## 2018-08-06 RX ADMIN — Medication 2 TABLET(S): at 21:39

## 2018-08-06 RX ADMIN — SIMVASTATIN 20 MILLIGRAM(S): 20 TABLET, FILM COATED ORAL at 21:40

## 2018-08-06 RX ADMIN — HYDROMORPHONE HYDROCHLORIDE 2 MILLIGRAM(S): 2 INJECTION INTRAMUSCULAR; INTRAVENOUS; SUBCUTANEOUS at 21:48

## 2018-08-06 RX ADMIN — HYDROMORPHONE HYDROCHLORIDE 2 MILLIGRAM(S): 2 INJECTION INTRAMUSCULAR; INTRAVENOUS; SUBCUTANEOUS at 22:48

## 2018-08-06 RX ADMIN — PANTOPRAZOLE SODIUM 40 MILLIGRAM(S): 20 TABLET, DELAYED RELEASE ORAL at 06:49

## 2018-08-06 RX ADMIN — TRAMADOL HYDROCHLORIDE 25 MILLIGRAM(S): 50 TABLET ORAL at 21:47

## 2018-08-06 RX ADMIN — Medication 12.5 MILLIGRAM(S): at 18:40

## 2018-08-06 RX ADMIN — BUDESONIDE AND FORMOTEROL FUMARATE DIHYDRATE 2 PUFF(S): 160; 4.5 AEROSOL RESPIRATORY (INHALATION) at 18:42

## 2018-08-06 NOTE — PROGRESS NOTE ADULT - SUBJECTIVE AND OBJECTIVE BOX
SUBJECTIVE: Pt seen, chart reviewed.  Pt's HHA at bedside.  Pt was premedicated.   Pt's son came 1/2 way through dressing change.  Pt tolerated dressing change much better. Improving pain-- pain when wounds open to air but dissipated when covered/redressed    Pt seen w/Plastics   Pt's wounds continue to improve- dramatic improvement noted since restart of steroids  Derm ok'ed restarting steroids 2/2 last path bx results, suggestive but not definitive for PG but still no definitive dx for ulcers.    f/u w/ rheum/ derm/ maybe hem/onc  spontaneous necrotic hemorraghic wounds w/ fat necrosis    Pt wants to go home- and walk-  partial wgt bearing RLE-  Pt noted her nails have been cut/ polish changed- as there was concern re hygiene.   Discussed again w/ pt  importance of getting oob to chair & doing daily exercises & not refusing PT    No odor, redness, warmth, f/c/s noted  drainage managed by dressings	    ROS skin / msk see HPI   all other systems negative      aspirin enteric coated 81 milliGRAM(s) Oral daily  minocycline 100 milliGRAM(s) Oral two times a day      Physical Exam:  Vital Signs Last 24 Hrs  T(C): 36.8 (06 Aug 2018 11:21), Max: 36.9 (05 Aug 2018 16:09)  T(F): 98.2 (06 Aug 2018 11:21), Max: 98.5 (05 Aug 2018 16:09)  HR: 60 (06 Aug 2018 11:21) (60 - 65)  BP: 147/70 (06 Aug 2018 11:21) (115/72 - 170/89)  BP(mean): --  RR: 18 (06 Aug 2018 11:21) (16 - 18)  SpO2: 98% (06 Aug 2018 11:21) (94% - 98%)    General Appearance:    NAD /  A&Ox3  MO/ frail/ WG  Envella    Musculoskeletal/Vascular:  BLE edema  BLE equally warm no acute ischemia noted  no deformities/ contractures  Rt knee wound per plastics    Skin:  moist, atrophic, frail,  ecchymosis w/o hematoma    pt is tender when just gently touching skin- therefore pt was premedicated prior to assessment    Multiple wounds- see measurements in A&I sections- placed by wound PT    RLQ abdomen wound w/ moist & granular tissue no odor, kimber- nearly healed- white foam     Rt anterior thigh wound w/ moist & granular tissue - kimber- nearly healed  Rt superior posterolateral w/ new upper thigh 2 small wounds opened w/ serosanguinous drainage w/ increased granular tissue w/ scant fibrinous exudate & slough  Rt inferior posterolateral wound   increased moist & granular tissue w/ necrotic tissue medially beginning to lift  (+) serosanguinous drainage & tender w/o odor  No erythema, increased warmth, induration, fluctuance    Lt upper lateral thigh wound  full thickness ulcers w/ moist & granular tissue   (+)serosanguinous  drainage  (+)tender w/o malodor  No erythema, increased warmth, induration, fluctuance    Lt lateral posterior thigh (inferior) superficial w/ 1 small partial thickness w/ crusted drainage- nearly healed    Lt  Upper medial inner thigh   2 wounds/ 2 full thickness -   moist &granular w/ scant fibrinous exudate  No drainage  (+)tender w/o odor  No erythema, increased warmth, induration, fluctuance          LABS:                        9.9    12.33 )-----------( 227      ( 06 Aug 2018 09:24 )             32.4     PT/INR - ( 06 Aug 2018 09:36 )   PT: 22.5 sec;   INR: 2.03 ratio             RADIOLOGY & ADDITIONAL STUDIES:    < from: Xray Knee 1 or 2 Views, Right (08.05.18 @ 14:07) >  FINDINGS:  Status post right knee arthrodesis with intramedullary ruthann and cement   fixation. Hardware is intact. No periprosthetic fractures. No knee joint   effusion. Vascular calcifications. Wound VAC again seen projecting over   the anterior aspect of the patellar tendon.    IMPRESSION:   Right knee arthrodesis without complication.

## 2018-08-06 NOTE — PROGRESS NOTE ADULT - ASSESSMENT
77F s/p Right total knee insertion of spacer, irrigation and debridement, complex wound closure 3/23; readmitted 6/8 for appendicitis, course has been complicated by multiple wounds most notably R knee, midline abdomen, R medial thigh, R lateral thigh, L medial thigh, L lateral thigh.     Plan:  -Patient with greatly improved tolerance of dressing changes  -Will continue vac/dressing changes M/W/F, next on 8/8.  -Remainder of wounds per wound care/derm.  -Okay for discharge from PRS perspective.  -Rest of management per primary team

## 2018-08-06 NOTE — PROGRESS NOTE ADULT - ASSESSMENT
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 with recent admission from (3/9/2018 to 6/6/2018) for TKR c/b joint infection s/p explantation with multiple wounds from rehab for evaluation of fever, lethargy, and nausea at rehab found to be febrile here with evidence of appendicitis on CT abdomen, non-operative candidate, now resolved      Multiple wounds Abdomen Bilateral thighs -no gross signs of infection    VAC to RLQ abdominal wound (white foam) and & Rt knee (black foam)  Rt anterior thigh- Aquacel + Allevyn  Rt lateral thigh- Dakins  LLE Multiple smaller wounds-Lateral thigh- Aquacel Ag dressings- & Medial thigh Medihoney dressing  Rt knee w/ skin dehiscence - per plastics and ortho  F/u Derm/ Rheum/ Hem  Abx per Medicine/ ID  Gen SUrg/ Plastics/ Ortho consults appreciated  con't offloading  con't Nuturition  con't Pericare  Con't per Medicine  F/u as outpatient in Wound Center [address] [facility] [phone]  Pt seen w/ Plastic surgery, d/w Medicine & d/w attending  Janki Cramer PA-C [id number]  I spent  35 minutes w/ this pt of which more than 50% of the time was spent counseling & coordinating care of this pt.

## 2018-08-06 NOTE — PROGRESS NOTE ADULT - ASSESSMENT
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 Pt completed 10 days meropenem.     # Abd wound, multiple bl thigh wounds  wounds improving on steroids started 8/18  appreciate derm recs, would not change to steroid sparing agent as responding, to cont steroids until outpt fu with derm  leukocytosis likely 2/2 steroids  working diagnosis likely pyoderma gangrenosum vs panniculitis as steroid responsive, despite negative biopsies  sp punch biopsy 7/11, positive for urticaria, not consistent with PG, prior biopsies were not consistent with infection/vasculitis/malignancy   tissue culture grew coag neg staph likely colonized, swab for afb and fungal cx neg  heme did not feel coumadin necrosis likely given chronic use of coumadin  continue vac to abdomen, rt Knee, appreciate wound care recs, can continue wound care at rehab/outpt  cleared for discharge by plastics    # Pain control  cont dilaudid 2mg po bid w dressing changes, previously was on 4mg and became lethargic so would not increase, avoid IV pain medications  tramadol prn, tylenol po prn  gabapentin 300mg tid  Xanax 0.25 prior to dressing change  bowel regimen    # polymyalgia rheumatica  no signs of active rheum dz, evaluated by rheum 6/29    # TKR wound-mrsa  chronic minocycline suppression  no acute infectious process, ID following    # chr Anemia  stable, monitor h/h closely    # S/P TAVR (transcatheter aortic valve replacement)  daily pt/inr, daily coumadin dosing    # adjustment disorder with depressed mood  xanax prn    dispo:   wounds improving with steroids, likely to continue steroids for extended duration  discharge planning has been difficult,  refusing discharge until wounds heal  will ask administration to get involved

## 2018-08-06 NOTE — PROGRESS NOTE ADULT - SUBJECTIVE AND OBJECTIVE BOX
Plastic Surgery  Daily Progress Note     Subjective/24 Hour Events:  Patient seen and examined.  No acute events overnight.   Pain well controlled.     Objective:    Allergies:  amoxicillin (Other)  IV Contrast (Flushing (Skin); Nausea)      Meds:   acetaminophen   Tablet. 650 milliGRAM(s) Oral every 6 hours PRN  acetaminophen  IVPB. 1000 milliGRAM(s) IV Intermittent once  ascorbic acid 500 milliGRAM(s) Oral daily  aspirin enteric coated 81 milliGRAM(s) Oral daily  betamethasone valerate 0.1% Ointment 1 Application(s) Topical two times a day  bisacodyl Suppository 10 milliGRAM(s) Rectal daily PRN  bismuth subsalicylate Liquid 30 milliLiter(s) Oral every 6 hours PRN  buDESOnide 160 MICROgram(s)/formoterol 4.5 MICROgram(s) Inhaler 2 Puff(s) Inhalation two times a day  calcium carbonate  625 mG + Vitamin D (OsCal 250 + D) 2 Tablet(s) Oral three times a day  Dakins Solution - 1/4 Strength 1 Application(s) Topical two times a day  folic acid 1 milliGRAM(s) Oral daily  gabapentin 300 milliGRAM(s) Oral three times a day  HYDROmorphone   Tablet 2 milliGRAM(s) Oral every 12 hours PRN  loratadine 10 milliGRAM(s) Oral daily  magnesium hydroxide Suspension 30 milliLiter(s) Oral daily PRN  melatonin 3 milliGRAM(s) Oral at bedtime  metoprolol tartrate 12.5 milliGRAM(s) Oral two times a day  minocycline 100 milliGRAM(s) Oral two times a day  multivitamin 1 Tablet(s) Oral daily  pantoprazole    Tablet 40 milliGRAM(s) Oral before breakfast  polyethylene glycol 3350 17 Gram(s) Oral two times a day  predniSONE   Tablet 50 milliGRAM(s) Oral daily  senna 2 Tablet(s) Oral at bedtime PRN  simethicone 80 milliGRAM(s) Chew two times a day PRN  simvastatin 20 milliGRAM(s) Oral at bedtime  tiotropium 18 MICROgram(s) Capsule 1 Capsule(s) Inhalation daily  traMADol 25 milliGRAM(s) Oral every 12 hours PRN  traMADol 25 milliGRAM(s) Oral once  warfarin 1 milliGRAM(s) Oral once      Vitals:  T(C): 36.9 (08-06-18 @ 15:42), Max: 36.9 (08-06-18 @ 15:42)  HR: 69 (08-06-18 @ 15:42) (60 - 69)  BP: 110/70 (08-06-18 @ 15:42) (110/70 - 170/89)  RR: 18 (08-06-18 @ 15:42) (16 - 18)  SpO2: 97% (08-06-18 @ 15:42) (97% - 98%)    I/O:     08-06-18 @ 07:01  -  08-06-18 @ 18:42  --------------------------------------------------------  IN: 900 mL / OUT: 0 mL / NET: 900 mL        Physical Exam:   General: Laying in bed, Alert, responsive.   Abd: central abdominal vac changed, wound healing well. R knee vac changed, no purulence, swelling, fluid collection. R medial thigh wound dressing changed. Lateral thigh wounds dressing changed, superior wounds packed with Dakins, large inferior wound with continued light gray discharge and filmy green-black covering.     Labs:   08-06    139  |  101  |  36<H>  ----------------------------<  93  4.9   |  26  |  0.65    Ca    8.7      06 Aug 2018 07:43                            9.9    12.33 )-----------( 227      ( 06 Aug 2018 09:24 )             32.4       Studies:

## 2018-08-06 NOTE — PROGRESS NOTE ADULT - SUBJECTIVE AND OBJECTIVE BOX
Patient is a 77y old  Female who presents with a chief complaint of fever, lethargy (07 Jun 2018 22:19)      SUBJECTIVE / OVERNIGHT EVENTS:    Patient seen and examined. in good spirits. denies cp sob nvd. patient states she wants to go home.      Vital Signs Last 24 Hrs  T(C): 36.8 (06 Aug 2018 11:21), Max: 36.9 (05 Aug 2018 16:09)  T(F): 98.2 (06 Aug 2018 11:21), Max: 98.5 (05 Aug 2018 16:09)  HR: 60 (06 Aug 2018 11:21) (60 - 65)  BP: 147/70 (06 Aug 2018 11:21) (115/72 - 170/89)  BP(mean): --  RR: 18 (06 Aug 2018 11:21) (16 - 18)  SpO2: 98% (06 Aug 2018 11:21) (94% - 98%)  I&O's Summary    06 Aug 2018 07:01  -  06 Aug 2018 11:29  --------------------------------------------------------  IN: 400 mL / OUT: 0 mL / NET: 400 mL        PE:  GENERAL: NAD, AAOx3, obese  HEAD:  Atraumatic, Normocephalic  EYES: EOMI, PERRLA, conjunctiva and sclera clear  NECK: Supple, No JVD  CHEST/LUNG: CTABL, No wheeze  HEART: Regular rate and rhythm; nomurmur  ABDOMEN: Soft, Nontender, Nondistended; Bowel sounds present, + wound vac abd  EXTREMITIES:  2+ Peripheral Pulses, No clubbing, cyanosis, or edema, right knee wound vac  SKIN: right lateral thigh dressing, right medial thigh dressing, left medial thigh dressing  NEURO: No focal deficits    LABS:                        9.9    12.33 )-----------( 227      ( 06 Aug 2018 09:24 )             32.4     08-06    139  |  101  |  36<H>  ----------------------------<  93  4.9   |  26  |  0.65    Ca    8.7      06 Aug 2018 07:43      PT/INR - ( 06 Aug 2018 09:36 )   PT: 22.5 sec;   INR: 2.03 ratio           CAPILLARY BLOOD GLUCOSE                RADIOLOGY & ADDITIONAL TESTS:    Imaging Personally Reviewed:  [x] YES  [ ] NO    Consultant(s) Notes Reviewed:  [x] YES  [ ] NO    MEDICATIONS  (STANDING):  acetaminophen  IVPB. 1000 milliGRAM(s) IV Intermittent once  ascorbic acid 500 milliGRAM(s) Oral daily  aspirin enteric coated 81 milliGRAM(s) Oral daily  betamethasone valerate 0.1% Ointment 1 Application(s) Topical two times a day  buDESOnide 160 MICROgram(s)/formoterol 4.5 MICROgram(s) Inhaler 2 Puff(s) Inhalation two times a day  calcium carbonate  625 mG + Vitamin D (OsCal 250 + D) 2 Tablet(s) Oral three times a day  clobetasol 0.05% Ointment 1 Application(s) Topical two times a day  Dakins Solution - 1/4 Strength 1 Application(s) Topical two times a day  folic acid 1 milliGRAM(s) Oral daily  gabapentin 300 milliGRAM(s) Oral three times a day  loratadine 10 milliGRAM(s) Oral daily  melatonin 3 milliGRAM(s) Oral at bedtime  metoprolol tartrate 12.5 milliGRAM(s) Oral two times a day  minocycline 100 milliGRAM(s) Oral two times a day  multivitamin 1 Tablet(s) Oral daily  pantoprazole    Tablet 40 milliGRAM(s) Oral before breakfast  polyethylene glycol 3350 17 Gram(s) Oral two times a day  predniSONE   Tablet 50 milliGRAM(s) Oral daily  simvastatin 20 milliGRAM(s) Oral at bedtime  tiotropium 18 MICROgram(s) Capsule 1 Capsule(s) Inhalation daily  traMADol 25 milliGRAM(s) Oral once    MEDICATIONS  (PRN):  acetaminophen   Tablet. 650 milliGRAM(s) Oral every 6 hours PRN Mild Pain (1 - 3)  bisacodyl Suppository 10 milliGRAM(s) Rectal daily PRN Constipation  bismuth subsalicylate Liquid 30 milliLiter(s) Oral every 6 hours PRN Cramping/abd pain  HYDROmorphone   Tablet 2 milliGRAM(s) Oral every 12 hours PRN prior to dressing change/ 30mins before dressing change.  magnesium hydroxide Suspension 30 milliLiter(s) Oral daily PRN Constipation  senna 2 Tablet(s) Oral at bedtime PRN Constipation  simethicone 80 milliGRAM(s) Chew two times a day PRN Gas  traMADol 25 milliGRAM(s) Oral every 12 hours PRN Moderate Pain (4 - 6)      Care Discussed with Consultants/Other Providers [x] YES  [ ] NO    HEALTH ISSUES - PROBLEM Dx:  Urticaria: Urticaria  Joint infection: Joint infection  Other problems related to medical facilities and other health care: Other problems related to medical facilities and other health care  S/P TAVR (transcatheter aortic valve replacement): S/P TAVR (transcatheter aortic valve replacement)  Acute cystitis without hematuria: Acute cystitis without hematuria  Wound infection: Wound infection  Acute appendicitis, unspecified acute appendicitis type: Acute appendicitis, unspecified acute appendicitis type  Fever, unspecified fever cause: Fever, unspecified fever cause

## 2018-08-06 NOTE — PROGRESS NOTE ADULT - SUBJECTIVE AND OBJECTIVE BOX
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    Patient was recently admitted from 4/9/18 - 6/6/18. Patient was initially presented for right total knee spacer exchange and I&D with complex wound closure. Previously had a spacer exchanged performed on 3/9 and then was discharged to rehab. Patient then returned on 4/9 with fever and concern for sepsis, found to have positive cultures from knee for MRSA as well as multifocal pneumonia. During admission, patient completed course of Daptomycin for MRSA prosthesis infection and was also treated for multifocal PNA with ?aztreonam. Patient had extensive wound care, followed by Surgery/Plastics/Orthopedics/Id and was discharged to rehab on 6/6 with suppressive minocycline. Admission complicated by lesion over thigh and lower pannus requiring wound vacs.  UA On 6/5 positive, but no culture sent and no antibiotics started.      Patient presents from rehab with fevers and lethargy shortly after discharge. In the ED, Tmax 100.6, HR 85,  /62, O2 98% on 2L NC. Labs showed mild leukocytosis with WBC 13.8, stable anemia Hgb 9.6, and normal platelets 319. Metabolic panel overall unremarkable with Cr 0.89. LFTs unremarkable. VBG with lactate 1.3. UA again positive with >50 WBCs, few bacteria, turbid appearance, and large LE.     Patient had a CT abdomen/pelvis that showed evidence of possible distal appendicitis. Evaluated by Surgery and family declining operative intervention due to high risk. Plastics to aid with wound management, to apply wound vac again in AM to thigh and pannus. Patient not felt to have evidence of wound infection. (07 Jun 2018 22:19)    I saw the pt several times on the last admit. and she had a ferritin that was over 400 and was given at times procrit when the hemoglobin got into the 7 range. The pt has a inflammatory anemia and the hemoglobin now is about 8.6 and she is normocytic hypochromic with a high rdw. Will at this time follow the pt and I see no need now for an extensive workup for this anemia. Will support as needed. 7/12 met with pt and aid at bedside and explained approach to the anemia no need for procrit. 7/16 notes reviewed from the last few days and the hemoglobin was 8.4. 7/18 case discussed with Dr Joseph and steroids may need to be given. the hemoglobin was noted to be 8.9 and epo will be on hold for now.     7/20 was asked by Dr Joseph to comment on the possibility of the pt having coumadin necrosis. It is a very rare occurrence and generally occurs acutely. The cause of coumadin necrosis would occur almost immediately in pts with a low protein s and c level. Both of these are vitamin k dependant factors and with the start of coumadin both of these factors would decrease. The protein s and c are modulators of factor 5 and would on initiation cause local ulcers. Pts like this would also have had clots in the past. The pts is not likely to have this with her history here. Obtaining a protein s and c levels here would not be helpful as these levels will be low on the coumadin. It would take 3 weeks or so off the coumadin for these levels to come up to baseline. Since this a rare case we might consider stopping the warfarin and give the pt a heparin derivative. Antiphospholipid antibody syndrome is not likely in the setting of normal platelets normal ptt in the past and no history of arterial and venous clots, no heart valve clots etc. 7/24 stable and pt to be given steroids, there may be some healing of the wounds noted on ortho exam.    7/30 the notes from the last week were read and reviewed and it would appear that the pt was getting some wound healing. The hemoglobin has been stable and she may be going to rehab soon.  8/6 notes from the week noted and the wounds are apparently healing well now. Hemoglobin was stable.     MEDICATIONS  (STANDING):  acetaminophen  IVPB. 1000 milliGRAM(s) IV Intermittent once  ascorbic acid 500 milliGRAM(s) Oral daily  aspirin enteric coated 81 milliGRAM(s) Oral daily  betamethasone valerate 0.1% Ointment 1 Application(s) Topical two times a day  buDESOnide 160 MICROgram(s)/formoterol 4.5 MICROgram(s) Inhaler 2 Puff(s) Inhalation two times a day  calcium carbonate  625 mG + Vitamin D (OsCal 250 + D) 2 Tablet(s) Oral three times a day  Dakins Solution - 1/4 Strength 1 Application(s) Topical two times a day  folic acid 1 milliGRAM(s) Oral daily  gabapentin 300 milliGRAM(s) Oral three times a day  loratadine 10 milliGRAM(s) Oral daily  melatonin 3 milliGRAM(s) Oral at bedtime  metoprolol tartrate 12.5 milliGRAM(s) Oral two times a day  minocycline 100 milliGRAM(s) Oral two times a day  multivitamin 1 Tablet(s) Oral daily  pantoprazole    Tablet 40 milliGRAM(s) Oral before breakfast  polyethylene glycol 3350 17 Gram(s) Oral two times a day  predniSONE   Tablet 50 milliGRAM(s) Oral daily  simvastatin 20 milliGRAM(s) Oral at bedtime  tiotropium 18 MICROgram(s) Capsule 1 Capsule(s) Inhalation daily  traMADol 25 milliGRAM(s) Oral once    MEDICATIONS  (PRN):  acetaminophen   Tablet. 650 milliGRAM(s) Oral every 6 hours PRN Mild Pain (1 - 3)  bisacodyl Suppository 10 milliGRAM(s) Rectal daily PRN Constipation  bismuth subsalicylate Liquid 30 milliLiter(s) Oral every 6 hours PRN Cramping/abd pain  HYDROmorphone   Tablet 2 milliGRAM(s) Oral every 12 hours PRN prior to dressing change/ 30mins before dressing change.  magnesium hydroxide Suspension 30 milliLiter(s) Oral daily PRN Constipation  senna 2 Tablet(s) Oral at bedtime PRN Constipation  simethicone 80 milliGRAM(s) Chew two times a day PRN Gas  traMADol 25 milliGRAM(s) Oral every 12 hours PRN Moderate Pain (4 - 6)                    PAST MEDICAL & SURGICAL HISTORY:  CAD (coronary artery disease): HERBERT to LAD 11/2016  Aortic stenosis, severe  Uncomplicated asthma, unspecified asthma severity  Polymyalgia  Lumbar disc disease with radiculopathy  Spider veins  Vital Signs Last 24 Hrs  T(C): 36.8 (06 Aug 2018 11:21), Max: 36.9 (05 Aug 2018 16:09)  T(F): 98.2 (06 Aug 2018 11:21), Max: 98.5 (05 Aug 2018 16:09)  HR: 60 (06 Aug 2018 11:21) (60 - 65)  BP: 147/70 (06 Aug 2018 11:21) (115/72 - 170/89)  BP(mean): --  RR: 18 (06 Aug 2018 11:21) (16 - 18)  SpO2: 98% (06 Aug 2018 11:21) (94% - 98%)Anxiety  Severe aortic stenosis by prior echocardiogram: 2013 and  11/2016  Dysphagia  Macular degeneration  Hiatal hernia  GERD (gastroesophageal reflux disease)  Sciatica  Osteoarthritis  S/P TKR (total knee replacement): joint infection s/p explant and abx spacer on 12/17/17  S/P TAVR (transcatheter aortic valve replacement): 12/22/17  Artificial cardiac pacemaker: 12/25/17  H/O cardiac catheterization: 11/29/16 HERBERT placed on LAD  H/O endoscopy: with dilatation of esophageal stricture 2013  S/P cataract surgery: x2; 3-4yr ago  S/P gastroplasty: 28 yrs ago  S/P cholecystectomy: more than 15 years ago  S/P total knee replacement: left, 15yr ago  S/P total knee replacement: right, 12yr ago  S/P hysterectomy: 24yr ago      SOCIAL HISTORY:    FAMILY HISTORY:  No pertinent family history in first degree relatives      Allergies    amoxicillin (Other)    Intolerances    IV Contrast (Flushing (Skin); Nausea)      PHYSICAL EXAM pt being attended to by the team and will return later  General: adult in NAD                        9.9    12.33 )-----------( 227      ( 06 Aug 2018 09:24 )             32.4

## 2018-08-07 LAB
HCT VFR BLD CALC: 32.3 % — LOW (ref 34.5–45)
HGB BLD-MCNC: 9.6 G/DL — LOW (ref 11.5–15.5)
INR BLD: 1.91 RATIO — HIGH (ref 0.88–1.16)
MCHC RBC-ENTMCNC: 25.9 PG — LOW (ref 27–34)
MCHC RBC-ENTMCNC: 29.7 GM/DL — LOW (ref 32–36)
MCV RBC AUTO: 87.3 FL — SIGNIFICANT CHANGE UP (ref 80–100)
PLATELET # BLD AUTO: 218 K/UL — SIGNIFICANT CHANGE UP (ref 150–400)
PROTHROM AB SERPL-ACNC: 21.9 SEC — HIGH (ref 10–13.1)
RBC # BLD: 3.7 M/UL — LOW (ref 3.8–5.2)
RBC # FLD: 17.9 % — HIGH (ref 10.3–14.5)
WBC # BLD: 11.12 K/UL — HIGH (ref 3.8–10.5)
WBC # FLD AUTO: 11.12 K/UL — HIGH (ref 3.8–10.5)

## 2018-08-07 PROCEDURE — 99232 SBSQ HOSP IP/OBS MODERATE 35: CPT

## 2018-08-07 RX ORDER — WARFARIN SODIUM 2.5 MG/1
3 TABLET ORAL ONCE
Qty: 0 | Refills: 0 | Status: COMPLETED | OUTPATIENT
Start: 2018-08-07 | End: 2018-08-07

## 2018-08-07 RX ADMIN — Medication 50 MILLIGRAM(S): at 13:46

## 2018-08-07 RX ADMIN — Medication 2 TABLET(S): at 21:33

## 2018-08-07 RX ADMIN — BUDESONIDE AND FORMOTEROL FUMARATE DIHYDRATE 2 PUFF(S): 160; 4.5 AEROSOL RESPIRATORY (INHALATION) at 06:30

## 2018-08-07 RX ADMIN — Medication 1 TABLET(S): at 13:48

## 2018-08-07 RX ADMIN — Medication 1 APPLICATION(S): at 18:21

## 2018-08-07 RX ADMIN — Medication 2 TABLET(S): at 13:47

## 2018-08-07 RX ADMIN — Medication 12.5 MILLIGRAM(S): at 06:30

## 2018-08-07 RX ADMIN — MINOCYCLINE HYDROCHLORIDE 100 MILLIGRAM(S): 45 TABLET, EXTENDED RELEASE ORAL at 18:21

## 2018-08-07 RX ADMIN — HYDROMORPHONE HYDROCHLORIDE 2 MILLIGRAM(S): 2 INJECTION INTRAMUSCULAR; INTRAVENOUS; SUBCUTANEOUS at 11:41

## 2018-08-07 RX ADMIN — SIMVASTATIN 20 MILLIGRAM(S): 20 TABLET, FILM COATED ORAL at 21:33

## 2018-08-07 RX ADMIN — GABAPENTIN 300 MILLIGRAM(S): 400 CAPSULE ORAL at 06:32

## 2018-08-07 RX ADMIN — BUDESONIDE AND FORMOTEROL FUMARATE DIHYDRATE 2 PUFF(S): 160; 4.5 AEROSOL RESPIRATORY (INHALATION) at 18:21

## 2018-08-07 RX ADMIN — GABAPENTIN 300 MILLIGRAM(S): 400 CAPSULE ORAL at 17:15

## 2018-08-07 RX ADMIN — TIOTROPIUM BROMIDE 1 CAPSULE(S): 18 CAPSULE ORAL; RESPIRATORY (INHALATION) at 13:47

## 2018-08-07 RX ADMIN — MINOCYCLINE HYDROCHLORIDE 100 MILLIGRAM(S): 45 TABLET, EXTENDED RELEASE ORAL at 06:32

## 2018-08-07 RX ADMIN — HYDROMORPHONE HYDROCHLORIDE 2 MILLIGRAM(S): 2 INJECTION INTRAMUSCULAR; INTRAVENOUS; SUBCUTANEOUS at 10:41

## 2018-08-07 RX ADMIN — Medication 1 APPLICATION(S): at 11:00

## 2018-08-07 RX ADMIN — Medication 1 MILLIGRAM(S): at 17:15

## 2018-08-07 RX ADMIN — PANTOPRAZOLE SODIUM 40 MILLIGRAM(S): 20 TABLET, DELAYED RELEASE ORAL at 06:31

## 2018-08-07 RX ADMIN — WARFARIN SODIUM 3 MILLIGRAM(S): 2.5 TABLET ORAL at 21:32

## 2018-08-07 RX ADMIN — Medication 3 MILLIGRAM(S): at 21:33

## 2018-08-07 RX ADMIN — POLYETHYLENE GLYCOL 3350 17 GRAM(S): 17 POWDER, FOR SOLUTION ORAL at 06:32

## 2018-08-07 RX ADMIN — LORATADINE 10 MILLIGRAM(S): 10 TABLET ORAL at 17:15

## 2018-08-07 RX ADMIN — Medication 81 MILLIGRAM(S): at 13:46

## 2018-08-07 RX ADMIN — Medication 500 MILLIGRAM(S): at 13:46

## 2018-08-07 RX ADMIN — Medication 2 TABLET(S): at 06:31

## 2018-08-07 RX ADMIN — Medication 12.5 MILLIGRAM(S): at 18:21

## 2018-08-07 RX ADMIN — GABAPENTIN 300 MILLIGRAM(S): 400 CAPSULE ORAL at 21:32

## 2018-08-07 RX ADMIN — Medication 1 APPLICATION(S): at 06:33

## 2018-08-07 RX ADMIN — Medication 1 APPLICATION(S): at 21:33

## 2018-08-07 NOTE — PROGRESS NOTE ADULT - SUBJECTIVE AND OBJECTIVE BOX
pain controlled, no issues  has been getting oob to beside chair daily, per nurse not allowed to leave room bc contact precautions  has not stood on leg yet but patient wants to do this    xr r knee: hardware unchanged, no loosening, no fracture    afvss  nad  abdominal wound - vac  RLE  thigh - dressing per wound care teams, lateral wound wet to dry per prs/wc teams  knee wound with vac  min stable ecchymosis on anterior tibia, ttb in this area as well, no obvious deformity, no swelling  remainder of wounds dressed by wound care  5/5 ta/ehl/gcs  silt l4-s1  2+ dp pulse    ROS: depressed

## 2018-08-07 NOTE — PROGRESS NOTE ADULT - SUBJECTIVE AND OBJECTIVE BOX
CC: f/u for multiple wounds and MRSA rt knee infection    Patient reports: no specific complaints, wounds reportedly improving on steroids    REVIEW OF SYSTEMS:  All other review of systems negative (Comprehensive ROS)    Antimicrobials Day #  :long term  minocycline 100 milliGRAM(s) Oral two times a day    Other Medications Reviewed    T(F): 98.5 (08-07-18 @ 16:31), Max: 98.5 (08-07-18 @ 16:31)  HR: 64 (08-07-18 @ 16:31)  BP: 121/76 (08-07-18 @ 16:31)  RR: 18 (08-07-18 @ 16:31)  SpO2: 96% (08-07-18 @ 16:31)  Wt(kg): --    PHYSICAL EXAM:  General: alert, no acute distress  Eyes:  anicteric, no conjunctival injection, no discharge  Oropharynx: no lesions or injection 	  Neck: supple, without adenopathy  Lungs: clear to auscultation, diminished at bases  Heart: regular rate and rhythm; no murmur, rubs or gallops  Abdomen: soft, nondistended, nontender, without mass or organomegaly  Skin: multiple wounds dressed, ? MCN hyperpigmentation  Extremities: no clubbing, cyanosis, or edema  Neurologic: alert, oriented, moves all extremities    LAB RESULTS:                        9.6    11.12 )-----------( 218      ( 07 Aug 2018 09:25 )             32.3     08-06    139  |  101  |  36<H>  ----------------------------<  93  4.9   |  26  |  0.65    Ca    8.7      06 Aug 2018 07:43          MICROBIOLOGY:  RECENT CULTURES:    All AFB cultures have been negative  RADIOLOGY REVIEWED:    < from: Xray Knee 1 or 2 Views, Right (08.04.18 @ 19:08) >  IMPRESSION:  Right knee arthrodesis without evidence of acute complication.    < end of copied text >

## 2018-08-07 NOTE — PROGRESS NOTE ADULT - ASSESSMENT
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 Pt completed 10 days meropenem.     # Abd wound, multiple bl thigh wounds  wounds improving on steroids started 8/18  appreciate derm recs, would not change to steroid sparing agent as responding, to cont steroids until outpt fu with derm  leukocytosis likely 2/2 steroids  working diagnosis likely pyoderma gangrenosum vs panniculitis as steroid responsive, despite negative biopsies  sp punch biopsy 7/11, positive for urticaria, not consistent with PG, prior biopsies were not consistent with infection/vasculitis/malignancy   tissue culture grew coag neg staph likely colonized, swab for afb and fungal cx neg  heme did not feel coumadin necrosis likely given chronic use of coumadin  continue vac to abdomen, rt Knee, appreciate wound care recs, can continue wound care at rehab/outpt  cleared for discharge by plastics    # Pain control  cont dilaudid 2mg po bid w dressing changes, previously was on 4mg and became lethargic so would not increase, avoid IV pain medications  tramadol prn, tylenol po prn  gabapentin 300mg tid  Xanax 0.25 prior to dressing change  bowel regimen    # polymyalgia rheumatica  no signs of active rheum dz, evaluated by rheum 6/29    # TKR wound-mrsa  chronic minocycline suppression  no acute infectious process, ID following    # chr Anemia  stable, monitor h/h closely    # S/P TAVR (transcatheter aortic valve replacement)  daily pt/inr, daily coumadin dosing    # adjustment disorder with depressed mood  xanax prn    dispo:   wounds improving with steroids, likely to continue steroids for extended duration  discharge planning has been difficult,  refusing discharge until wounds heal  asked for derm and wound care from [facility] to evaluate patient in the hospital and to discuss with family if wounds are appropriate to be cared for in rehab setting

## 2018-08-07 NOTE — PROGRESS NOTE ADULT - ASSESSMENT
no plan for orthopedic surgical intervention per family and patient preference  vac and wound care per PRS and wound care teams  per derm prednisone to treat possible atypical presentation of pyoderma granulosum, per report improving  PO abx per ID team, minocycline  encourage patient to spend majority of bed oob in bedside chair as able, has now been mobilizing to bedside chair, she must remain 50% wb on the RLE and NO KNEE MOTION allowed, knee immobilizer if oob  dvt ppx per primary  continue to optimize nutrition  continue to involve psych given depression symptoms

## 2018-08-07 NOTE — PROGRESS NOTE ADULT - ASSESSMENT
Morbid Obesity  PMR  Failed RT TKA with initial strep infection followed by MRSA infected spacer  Spontaneous areas of fat necrosis, pyoderma gangrenosum vs. panniculitis as per Derm, improving with steroids  Fungal/AFB cultures remain negative   Afebrile  Mild leukocytosis likely steroid effect  Suggest:  Continue chronic suppressive Minocycline for R knee spacer MRSA infection  Steroid trial as per derm  Local wound care, VAC  Plastics and wound care notes appreciated  Stable from ID viewpoint  disposition per other services

## 2018-08-07 NOTE — PROGRESS NOTE ADULT - SUBJECTIVE AND OBJECTIVE BOX
Patient is a 77y old  Female who presents with a chief complaint of fever, lethargy (07 Jun 2018 22:19)      SUBJECTIVE / OVERNIGHT EVENTS:    Patient seen and examined. denies cp sob. in good spirits. got out of bed yesterday. sp wound vac change yesterday and wounds improving per wound care.      Vital Signs Last 24 Hrs  T(C): 36.6 (07 Aug 2018 10:49), Max: 36.9 (06 Aug 2018 15:42)  T(F): 97.8 (07 Aug 2018 10:49), Max: 98.4 (06 Aug 2018 15:42)  HR: 56 (07 Aug 2018 10:49) (56 - 72)  BP: 146/70 (07 Aug 2018 10:49) (110/66 - 155/77)  BP(mean): --  RR: 18 (07 Aug 2018 10:49) (18 - 18)  SpO2: 98% (07 Aug 2018 10:49) (96% - 98%)  I&O's Summary    06 Aug 2018 07:01  -  07 Aug 2018 07:00  --------------------------------------------------------  IN: 1140 mL / OUT: 100 mL / NET: 1040 mL        PE:  GENERAL: NAD, AAOx3, obese  HEAD:  Atraumatic, Normocephalic  EYES: EOMI, PERRLA, conjunctiva and sclera clear  NECK: Supple, No JVD  CHEST/LUNG: CTABL, No wheeze  HEART: Regular rate and rhythm; no murmur  ABDOMEN: Soft, Nontender, Nondistended; Bowel sounds present, + wound vac abd  EXTREMITIES:  2+ Peripheral Pulses, No clubbing, cyanosis, or edema, right knee wound vac  SKIN: right lateral thigh dressing, right medial thigh dressing, left medial thigh dressing  NEURO: No focal deficits    LABS:                        9.6    11.12 )-----------( 218      ( 07 Aug 2018 09:25 )             32.3     08-06    139  |  101  |  36<H>  ----------------------------<  93  4.9   |  26  |  0.65    Ca    8.7      06 Aug 2018 07:43      PT/INR - ( 06 Aug 2018 09:36 )   PT: 22.5 sec;   INR: 2.03 ratio           CAPILLARY BLOOD GLUCOSE                RADIOLOGY & ADDITIONAL TESTS:    Imaging Personally Reviewed:  [x] YES  [ ] NO    Consultant(s) Notes Reviewed:  [x] YES  [ ] NO    MEDICATIONS  (STANDING):  acetaminophen  IVPB. 1000 milliGRAM(s) IV Intermittent once  ascorbic acid 500 milliGRAM(s) Oral daily  aspirin enteric coated 81 milliGRAM(s) Oral daily  betamethasone valerate 0.1% Ointment 1 Application(s) Topical two times a day  buDESOnide 160 MICROgram(s)/formoterol 4.5 MICROgram(s) Inhaler 2 Puff(s) Inhalation two times a day  calcium carbonate  625 mG + Vitamin D (OsCal 250 + D) 2 Tablet(s) Oral three times a day  Dakins Solution - 1/4 Strength 1 Application(s) Topical two times a day  folic acid 1 milliGRAM(s) Oral daily  gabapentin 300 milliGRAM(s) Oral three times a day  loratadine 10 milliGRAM(s) Oral daily  melatonin 3 milliGRAM(s) Oral at bedtime  metoprolol tartrate 12.5 milliGRAM(s) Oral two times a day  minocycline 100 milliGRAM(s) Oral two times a day  multivitamin 1 Tablet(s) Oral daily  pantoprazole    Tablet 40 milliGRAM(s) Oral before breakfast  polyethylene glycol 3350 17 Gram(s) Oral two times a day  predniSONE   Tablet 50 milliGRAM(s) Oral daily  simvastatin 20 milliGRAM(s) Oral at bedtime  tiotropium 18 MICROgram(s) Capsule 1 Capsule(s) Inhalation daily  traMADol 25 milliGRAM(s) Oral once    MEDICATIONS  (PRN):  acetaminophen   Tablet. 650 milliGRAM(s) Oral every 6 hours PRN Mild Pain (1 - 3)  bisacodyl Suppository 10 milliGRAM(s) Rectal daily PRN Constipation  bismuth subsalicylate Liquid 30 milliLiter(s) Oral every 6 hours PRN Cramping/abd pain  HYDROmorphone   Tablet 2 milliGRAM(s) Oral every 12 hours PRN prior to dressing change/ 30mins before dressing change.  magnesium hydroxide Suspension 30 milliLiter(s) Oral daily PRN Constipation  senna 2 Tablet(s) Oral at bedtime PRN Constipation  simethicone 80 milliGRAM(s) Chew two times a day PRN Gas  traMADol 25 milliGRAM(s) Oral every 12 hours PRN Moderate Pain (4 - 6)      Care Discussed with Consultants/Other Providers [x] YES  [ ] NO    HEALTH ISSUES - PROBLEM Dx:  Urticaria: Urticaria  Joint infection: Joint infection  Other problems related to medical facilities and other health care: Other problems related to medical facilities and other health care  S/P TAVR (transcatheter aortic valve replacement): S/P TAVR (transcatheter aortic valve replacement)  Acute cystitis without hematuria: Acute cystitis without hematuria  Wound infection: Wound infection  Acute appendicitis, unspecified acute appendicitis type: Acute appendicitis, unspecified acute appendicitis type  Fever, unspecified fever cause: Fever, unspecified fever cause

## 2018-08-08 LAB
CULTURE RESULTS: SIGNIFICANT CHANGE UP
HCT VFR BLD CALC: 31.3 % — LOW (ref 34.5–45)
HGB BLD-MCNC: 9.2 G/DL — LOW (ref 11.5–15.5)
INR BLD: 2.2 RATIO — HIGH (ref 0.88–1.16)
MCHC RBC-ENTMCNC: 25.7 PG — LOW (ref 27–34)
MCHC RBC-ENTMCNC: 29.4 GM/DL — LOW (ref 32–36)
MCV RBC AUTO: 87.4 FL — SIGNIFICANT CHANGE UP (ref 80–100)
PLATELET # BLD AUTO: 196 K/UL — SIGNIFICANT CHANGE UP (ref 150–400)
PROTHROM AB SERPL-ACNC: 25.3 SEC — HIGH (ref 10–13.1)
RBC # BLD: 3.58 M/UL — LOW (ref 3.8–5.2)
RBC # FLD: 18 % — HIGH (ref 10.3–14.5)
SPECIMEN SOURCE: SIGNIFICANT CHANGE UP
WBC # BLD: 12.32 K/UL — HIGH (ref 3.8–10.5)
WBC # FLD AUTO: 12.32 K/UL — HIGH (ref 3.8–10.5)

## 2018-08-08 RX ORDER — TRAMADOL HYDROCHLORIDE 50 MG/1
25 TABLET ORAL THREE TIMES A DAY
Qty: 0 | Refills: 0 | Status: DISCONTINUED | OUTPATIENT
Start: 2018-08-08 | End: 2018-08-15

## 2018-08-08 RX ORDER — HYDROMORPHONE HYDROCHLORIDE 2 MG/ML
2 INJECTION INTRAMUSCULAR; INTRAVENOUS; SUBCUTANEOUS ONCE
Qty: 0 | Refills: 0 | Status: DISCONTINUED | OUTPATIENT
Start: 2018-08-08 | End: 2018-08-08

## 2018-08-08 RX ORDER — TRAMADOL HYDROCHLORIDE 50 MG/1
25 TABLET ORAL ONCE
Qty: 0 | Refills: 0 | Status: DISCONTINUED | OUTPATIENT
Start: 2018-08-08 | End: 2018-08-08

## 2018-08-08 RX ORDER — WARFARIN SODIUM 2.5 MG/1
3 TABLET ORAL ONCE
Qty: 0 | Refills: 0 | Status: COMPLETED | OUTPATIENT
Start: 2018-08-08 | End: 2018-08-08

## 2018-08-08 RX ADMIN — WARFARIN SODIUM 3 MILLIGRAM(S): 2.5 TABLET ORAL at 21:38

## 2018-08-08 RX ADMIN — TRAMADOL HYDROCHLORIDE 25 MILLIGRAM(S): 50 TABLET ORAL at 17:50

## 2018-08-08 RX ADMIN — HYDROMORPHONE HYDROCHLORIDE 2 MILLIGRAM(S): 2 INJECTION INTRAMUSCULAR; INTRAVENOUS; SUBCUTANEOUS at 22:05

## 2018-08-08 RX ADMIN — Medication 2 TABLET(S): at 21:36

## 2018-08-08 RX ADMIN — GABAPENTIN 300 MILLIGRAM(S): 400 CAPSULE ORAL at 21:39

## 2018-08-08 RX ADMIN — Medication 1 APPLICATION(S): at 06:16

## 2018-08-08 RX ADMIN — MINOCYCLINE HYDROCHLORIDE 100 MILLIGRAM(S): 45 TABLET, EXTENDED RELEASE ORAL at 06:11

## 2018-08-08 RX ADMIN — HYDROMORPHONE HYDROCHLORIDE 2 MILLIGRAM(S): 2 INJECTION INTRAMUSCULAR; INTRAVENOUS; SUBCUTANEOUS at 10:56

## 2018-08-08 RX ADMIN — BUDESONIDE AND FORMOTEROL FUMARATE DIHYDRATE 2 PUFF(S): 160; 4.5 AEROSOL RESPIRATORY (INHALATION) at 06:11

## 2018-08-08 RX ADMIN — SIMVASTATIN 20 MILLIGRAM(S): 20 TABLET, FILM COATED ORAL at 21:39

## 2018-08-08 RX ADMIN — Medication 3 MILLIGRAM(S): at 21:39

## 2018-08-08 RX ADMIN — PANTOPRAZOLE SODIUM 40 MILLIGRAM(S): 20 TABLET, DELAYED RELEASE ORAL at 06:11

## 2018-08-08 RX ADMIN — GABAPENTIN 300 MILLIGRAM(S): 400 CAPSULE ORAL at 14:26

## 2018-08-08 RX ADMIN — Medication 81 MILLIGRAM(S): at 11:47

## 2018-08-08 RX ADMIN — TRAMADOL HYDROCHLORIDE 25 MILLIGRAM(S): 50 TABLET ORAL at 18:20

## 2018-08-08 RX ADMIN — Medication 12.5 MILLIGRAM(S): at 06:11

## 2018-08-08 RX ADMIN — Medication 1 APPLICATION(S): at 11:50

## 2018-08-08 RX ADMIN — Medication 1 MILLIGRAM(S): at 11:47

## 2018-08-08 RX ADMIN — Medication 12.5 MILLIGRAM(S): at 17:47

## 2018-08-08 RX ADMIN — Medication 2 TABLET(S): at 11:46

## 2018-08-08 RX ADMIN — Medication 1 APPLICATION(S): at 17:48

## 2018-08-08 RX ADMIN — GABAPENTIN 300 MILLIGRAM(S): 400 CAPSULE ORAL at 06:11

## 2018-08-08 RX ADMIN — Medication 1 TABLET(S): at 11:48

## 2018-08-08 RX ADMIN — Medication 500 MILLIGRAM(S): at 11:47

## 2018-08-08 RX ADMIN — Medication 1 APPLICATION(S): at 21:36

## 2018-08-08 RX ADMIN — LORATADINE 10 MILLIGRAM(S): 10 TABLET ORAL at 11:47

## 2018-08-08 RX ADMIN — MINOCYCLINE HYDROCHLORIDE 100 MILLIGRAM(S): 45 TABLET, EXTENDED RELEASE ORAL at 17:47

## 2018-08-08 RX ADMIN — TIOTROPIUM BROMIDE 1 CAPSULE(S): 18 CAPSULE ORAL; RESPIRATORY (INHALATION) at 11:50

## 2018-08-08 RX ADMIN — BUDESONIDE AND FORMOTEROL FUMARATE DIHYDRATE 2 PUFF(S): 160; 4.5 AEROSOL RESPIRATORY (INHALATION) at 17:47

## 2018-08-08 RX ADMIN — Medication 50 MILLIGRAM(S): at 11:48

## 2018-08-08 RX ADMIN — HYDROMORPHONE HYDROCHLORIDE 2 MILLIGRAM(S): 2 INJECTION INTRAMUSCULAR; INTRAVENOUS; SUBCUTANEOUS at 21:36

## 2018-08-08 RX ADMIN — HYDROMORPHONE HYDROCHLORIDE 2 MILLIGRAM(S): 2 INJECTION INTRAMUSCULAR; INTRAVENOUS; SUBCUTANEOUS at 11:46

## 2018-08-08 NOTE — PROGRESS NOTE ADULT - ASSESSMENT
77F s/p Right total knee insertion of spacer, irrigation and debridement, complex wound closure 3/23; readmitted 6/8 for appendicitis, course has been complicated by multiple wounds most notably R knee, midline abdomen, R medial thigh, R lateral thigh, L medial thigh, L lateral thigh.     Plan:  -Continue vac/dressing changes M/W/F, next on 8/10.  -Remainder of wounds per wound care/derm.    [facility] Surgery Pager: [phone]

## 2018-08-08 NOTE — PROGRESS NOTE ADULT - SUBJECTIVE AND OBJECTIVE BOX
Plastic Surgery  Daily Progress Note     Subjective/24 Hour Events:  Patient seen and examined with team.  No acute events overnight.   Wound vacs and dressings changed by PT today.     Objective:    Allergies:  amoxicillin (Other)  IV Contrast (Flushing (Skin); Nausea)      Meds:   acetaminophen   Tablet. 650 milliGRAM(s) Oral every 6 hours PRN  acetaminophen  IVPB. 1000 milliGRAM(s) IV Intermittent once  ascorbic acid 500 milliGRAM(s) Oral daily  aspirin enteric coated 81 milliGRAM(s) Oral daily  betamethasone valerate 0.1% Ointment 1 Application(s) Topical two times a day  bisacodyl Suppository 10 milliGRAM(s) Rectal daily PRN  bismuth subsalicylate Liquid 30 milliLiter(s) Oral every 6 hours PRN  buDESOnide 160 MICROgram(s)/formoterol 4.5 MICROgram(s) Inhaler 2 Puff(s) Inhalation two times a day  calcium carbonate  625 mG + Vitamin D (OsCal 250 + D) 2 Tablet(s) Oral three times a day  Dakins Solution - 1/4 Strength 1 Application(s) Topical two times a day  folic acid 1 milliGRAM(s) Oral daily  gabapentin 300 milliGRAM(s) Oral three times a day  loratadine 10 milliGRAM(s) Oral daily  magnesium hydroxide Suspension 30 milliLiter(s) Oral daily PRN  melatonin 3 milliGRAM(s) Oral at bedtime  metoprolol tartrate 12.5 milliGRAM(s) Oral two times a day  minocycline 100 milliGRAM(s) Oral two times a day  multivitamin 1 Tablet(s) Oral daily  pantoprazole    Tablet 40 milliGRAM(s) Oral before breakfast  polyethylene glycol 3350 17 Gram(s) Oral two times a day  predniSONE   Tablet 50 milliGRAM(s) Oral daily  senna 2 Tablet(s) Oral at bedtime PRN  simethicone 80 milliGRAM(s) Chew two times a day PRN  simvastatin 20 milliGRAM(s) Oral at bedtime  tiotropium 18 MICROgram(s) Capsule 1 Capsule(s) Inhalation daily  traMADol 25 milliGRAM(s) Oral once      Vitals:  T(C): 36.7 (08-08-18 @ 13:02), Max: 36.9 (08-07-18 @ 14:57)  HR: 63 (08-08-18 @ 13:02) (62 - 64)  BP: 144/85 (08-08-18 @ 13:02) (113/67 - 144/85)  RR: 18 (08-08-18 @ 13:02) (18 - 18)  SpO2: 98% (08-08-18 @ 13:02) (96% - 98%)    I/O:       Physical Exam:   Gen: Very uncomfortable after dressing changes, laying in bed.   Abd: central abdominal vac changed, wound healing well and kimber. R knee vac changed, healing well.      Labs:                               9.2    12.32 )-----------( 196      ( 08 Aug 2018 08:10 )             31.3       Studies:

## 2018-08-08 NOTE — PROGRESS NOTE ADULT - ASSESSMENT
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 Pt completed 10 days meropenem.     # Abd wound, multiple bl thigh wounds  wounds improving on steroids started 8/18  appreciate derm recs, would not change to steroid sparing agent as responding, to cont steroids until outpt fu with derm  leukocytosis likely 2/2 steroids  working diagnosis likely pyoderma gangrenosum vs panniculitis as steroid responsive, despite negative biopsies  sp punch biopsy 7/11, positive for urticaria, not consistent with PG, prior biopsies were not consistent with infection/vasculitis/malignancy   tissue culture grew coag neg staph likely colonized, swab for afb and fungal cx neg  heme did not feel coumadin necrosis likely given chronic use of coumadin  continue vac to abdomen, rt Knee, appreciate wound care recs, can continue wound care at rehab/outpt  cleared for discharge by plastics    # Pain control  cont dilaudid 2mg po bid w dressing changes, avoid IV pain medications  tramadol prn, tylenol po prn  gabapentin 300mg tid  Xanax 0.25 prior to dressing change  bowel regimen    # TKR wound-mrsa  chronic minocycline suppression  no acute infectious process, cleared by ID for DC    # chr Anemia  stable, monitor h/h closely    # S/P TAVR (transcatheter aortic valve replacement)  daily pt/inr, daily coumadin dosing    # adjustment disorder with depressed mood  xanax prn    dispo:   wounds improving with steroids, likely to continue steroids for extended duration  discharge planning has been difficult,  refusing discharge until wounds heal  dw administration, asked for derm and wound care from [facility] to evaluate patient in the hospital and to discuss with family if wounds are appropriate to be cared for in rehab setting

## 2018-08-08 NOTE — PROGRESS NOTE ADULT - SUBJECTIVE AND OBJECTIVE BOX
Patient is a 77y old  Female who presents with a chief complaint of fever, lethargy (07 Jun 2018 22:19)      SUBJECTIVE / OVERNIGHT EVENTS:    Patient seen and examined. denies cp sob nvd. no acute events.      Vital Signs Last 24 Hrs  T(C): 36.5 (07 Aug 2018 22:26), Max: 36.9 (07 Aug 2018 14:57)  T(F): 97.7 (07 Aug 2018 22:26), Max: 98.5 (07 Aug 2018 16:31)  HR: 62 (07 Aug 2018 22:26) (56 - 64)  BP: 113/67 (07 Aug 2018 22:26) (113/67 - 146/70)  BP(mean): --  RR: 18 (07 Aug 2018 22:26) (18 - 18)  SpO2: 97% (07 Aug 2018 22:26) (96% - 98%)  I&O's Summary      PE:  GENERAL: NAD, AAOx3, obese  HEAD:  Atraumatic, Normocephalic  EYES: EOMI, PERRLA, conjunctiva and sclera clear  NECK: Supple, No JVD  CHEST/LUNG: CTABL, No wheeze  HEART: Regular rate and rhythm; no murmur  ABDOMEN: Soft, Nontender, Nondistended; Bowel sounds present, + wound vac abd  EXTREMITIES:  2+ Peripheral Pulses, No clubbing, cyanosis, or edema, right knee wound vac  SKIN: right lateral thigh dressing, right medial thigh dressing, left medial thigh dressing  NEURO: No focal deficits    LABS:                        9.6    11.12 )-----------( 218      ( 07 Aug 2018 09:25 )             32.3           PT/INR - ( 07 Aug 2018 12:58 )   PT: 21.9 sec;   INR: 1.91 ratio           CAPILLARY BLOOD GLUCOSE                RADIOLOGY & ADDITIONAL TESTS:    Imaging Personally Reviewed:  [x] YES  [ ] NO    Consultant(s) Notes Reviewed:  [x] YES  [ ] NO    MEDICATIONS  (STANDING):  acetaminophen  IVPB. 1000 milliGRAM(s) IV Intermittent once  ascorbic acid 500 milliGRAM(s) Oral daily  aspirin enteric coated 81 milliGRAM(s) Oral daily  betamethasone valerate 0.1% Ointment 1 Application(s) Topical two times a day  buDESOnide 160 MICROgram(s)/formoterol 4.5 MICROgram(s) Inhaler 2 Puff(s) Inhalation two times a day  calcium carbonate  625 mG + Vitamin D (OsCal 250 + D) 2 Tablet(s) Oral three times a day  Dakins Solution - 1/4 Strength 1 Application(s) Topical two times a day  folic acid 1 milliGRAM(s) Oral daily  gabapentin 300 milliGRAM(s) Oral three times a day  loratadine 10 milliGRAM(s) Oral daily  melatonin 3 milliGRAM(s) Oral at bedtime  metoprolol tartrate 12.5 milliGRAM(s) Oral two times a day  minocycline 100 milliGRAM(s) Oral two times a day  multivitamin 1 Tablet(s) Oral daily  pantoprazole    Tablet 40 milliGRAM(s) Oral before breakfast  polyethylene glycol 3350 17 Gram(s) Oral two times a day  predniSONE   Tablet 50 milliGRAM(s) Oral daily  simvastatin 20 milliGRAM(s) Oral at bedtime  tiotropium 18 MICROgram(s) Capsule 1 Capsule(s) Inhalation daily  traMADol 25 milliGRAM(s) Oral once    MEDICATIONS  (PRN):  acetaminophen   Tablet. 650 milliGRAM(s) Oral every 6 hours PRN Mild Pain (1 - 3)  bisacodyl Suppository 10 milliGRAM(s) Rectal daily PRN Constipation  bismuth subsalicylate Liquid 30 milliLiter(s) Oral every 6 hours PRN Cramping/abd pain  magnesium hydroxide Suspension 30 milliLiter(s) Oral daily PRN Constipation  senna 2 Tablet(s) Oral at bedtime PRN Constipation  simethicone 80 milliGRAM(s) Chew two times a day PRN Gas      Care Discussed with Consultants/Other Providers [x] YES  [ ] NO    HEALTH ISSUES - PROBLEM Dx:  Urticaria: Urticaria  Joint infection: Joint infection  Other problems related to medical facilities and other health care: Other problems related to medical facilities and other health care  S/P TAVR (transcatheter aortic valve replacement): S/P TAVR (transcatheter aortic valve replacement)  Acute cystitis without hematuria: Acute cystitis without hematuria  Wound infection: Wound infection  Acute appendicitis, unspecified acute appendicitis type: Acute appendicitis, unspecified acute appendicitis type  Fever, unspecified fever cause: Fever, unspecified fever cause

## 2018-08-09 LAB
INR BLD: 3.92 RATIO — HIGH (ref 0.88–1.16)
PROTHROM AB SERPL-ACNC: 45.6 SEC — HIGH (ref 10–13.1)

## 2018-08-09 RX ADMIN — Medication 1 MILLIGRAM(S): at 13:15

## 2018-08-09 RX ADMIN — GABAPENTIN 300 MILLIGRAM(S): 400 CAPSULE ORAL at 21:49

## 2018-08-09 RX ADMIN — LORATADINE 10 MILLIGRAM(S): 10 TABLET ORAL at 13:15

## 2018-08-09 RX ADMIN — Medication 50 MILLIGRAM(S): at 13:15

## 2018-08-09 RX ADMIN — BUDESONIDE AND FORMOTEROL FUMARATE DIHYDRATE 2 PUFF(S): 160; 4.5 AEROSOL RESPIRATORY (INHALATION) at 17:18

## 2018-08-09 RX ADMIN — Medication 1 TABLET(S): at 13:15

## 2018-08-09 RX ADMIN — Medication 12.5 MILLIGRAM(S): at 17:20

## 2018-08-09 RX ADMIN — BUDESONIDE AND FORMOTEROL FUMARATE DIHYDRATE 2 PUFF(S): 160; 4.5 AEROSOL RESPIRATORY (INHALATION) at 06:43

## 2018-08-09 RX ADMIN — Medication 3 MILLIGRAM(S): at 21:49

## 2018-08-09 RX ADMIN — MINOCYCLINE HYDROCHLORIDE 100 MILLIGRAM(S): 45 TABLET, EXTENDED RELEASE ORAL at 06:42

## 2018-08-09 RX ADMIN — Medication 1 APPLICATION(S): at 06:42

## 2018-08-09 RX ADMIN — Medication 500 MILLIGRAM(S): at 13:15

## 2018-08-09 RX ADMIN — TIOTROPIUM BROMIDE 1 CAPSULE(S): 18 CAPSULE ORAL; RESPIRATORY (INHALATION) at 13:23

## 2018-08-09 RX ADMIN — TRAMADOL HYDROCHLORIDE 25 MILLIGRAM(S): 50 TABLET ORAL at 20:53

## 2018-08-09 RX ADMIN — SIMVASTATIN 20 MILLIGRAM(S): 20 TABLET, FILM COATED ORAL at 21:49

## 2018-08-09 RX ADMIN — Medication 1 APPLICATION(S): at 17:18

## 2018-08-09 RX ADMIN — GABAPENTIN 300 MILLIGRAM(S): 400 CAPSULE ORAL at 06:42

## 2018-08-09 RX ADMIN — Medication 1 APPLICATION(S): at 10:43

## 2018-08-09 RX ADMIN — MINOCYCLINE HYDROCHLORIDE 100 MILLIGRAM(S): 45 TABLET, EXTENDED RELEASE ORAL at 17:18

## 2018-08-09 RX ADMIN — Medication 81 MILLIGRAM(S): at 13:16

## 2018-08-09 RX ADMIN — GABAPENTIN 300 MILLIGRAM(S): 400 CAPSULE ORAL at 13:15

## 2018-08-09 RX ADMIN — PANTOPRAZOLE SODIUM 40 MILLIGRAM(S): 20 TABLET, DELAYED RELEASE ORAL at 06:42

## 2018-08-09 RX ADMIN — Medication 2 TABLET(S): at 06:42

## 2018-08-09 RX ADMIN — Medication 12.5 MILLIGRAM(S): at 06:42

## 2018-08-09 RX ADMIN — Medication 2 TABLET(S): at 13:15

## 2018-08-09 RX ADMIN — Medication 2 TABLET(S): at 21:49

## 2018-08-09 RX ADMIN — TRAMADOL HYDROCHLORIDE 25 MILLIGRAM(S): 50 TABLET ORAL at 10:30

## 2018-08-09 RX ADMIN — Medication 1 APPLICATION(S): at 21:48

## 2018-08-09 NOTE — PROGRESS NOTE ADULT - SUBJECTIVE AND OBJECTIVE BOX
Patient is a 77y old  Female who presents with a chief complaint of fever, lethargy (07 Jun 2018 22:19)      SUBJECTIVE / OVERNIGHT EVENTS:    Patient seen and examined. denies complaints. no acute events. did not get out of bed yesterday.      Vital Signs Last 24 Hrs  T(C): 36.8 (08 Aug 2018 21:21), Max: 36.8 (08 Aug 2018 21:21)  T(F): 98.2 (08 Aug 2018 21:21), Max: 98.2 (08 Aug 2018 21:21)  HR: 61 (09 Aug 2018 06:43) (58 - 67)  BP: 148/66 (09 Aug 2018 06:43) (128/72 - 148/66)  BP(mean): --  RR: 16 (08 Aug 2018 21:21) (16 - 18)  SpO2: 96% (08 Aug 2018 21:21) (96% - 98%)  I&O's Summary    08 Aug 2018 07:01  -  09 Aug 2018 07:00  --------------------------------------------------------  IN: 280 mL / OUT: 0 mL / NET: 280 mL        PE:  GENERAL: NAD, AAOx3, obese  HEAD:  Atraumatic, Normocephalic  EYES: EOMI, PERRLA, conjunctiva and sclera clear  NECK: Supple, No JVD  CHEST/LUNG: CTABL, No wheeze  HEART: Regular rate and rhythm; no murmur  ABDOMEN: Soft, Nontender, Nondistended; Bowel sounds present, + wound vac abd  EXTREMITIES:  2+ Peripheral Pulses, No clubbing, cyanosis, or edema, right knee wound vac  SKIN: right lateral thigh dressing, right medial thigh dressing, left medial thigh dressing  NEURO: No focal deficits    LABS:                        9.2    12.32 )-----------( 196      ( 08 Aug 2018 08:10 )             31.3           PT/INR - ( 08 Aug 2018 08:10 )   PT: 25.3 sec;   INR: 2.20 ratio           CAPILLARY BLOOD GLUCOSE                RADIOLOGY & ADDITIONAL TESTS:    Imaging Personally Reviewed:  [x] YES  [ ] NO    Consultant(s) Notes Reviewed:  [x] YES  [ ] NO    MEDICATIONS  (STANDING):  acetaminophen  IVPB. 1000 milliGRAM(s) IV Intermittent once  ascorbic acid 500 milliGRAM(s) Oral daily  aspirin enteric coated 81 milliGRAM(s) Oral daily  betamethasone valerate 0.1% Ointment 1 Application(s) Topical two times a day  buDESOnide 160 MICROgram(s)/formoterol 4.5 MICROgram(s) Inhaler 2 Puff(s) Inhalation two times a day  calcium carbonate  625 mG + Vitamin D (OsCal 250 + D) 2 Tablet(s) Oral three times a day  Dakins Solution - 1/4 Strength 1 Application(s) Topical two times a day  folic acid 1 milliGRAM(s) Oral daily  gabapentin 300 milliGRAM(s) Oral three times a day  loratadine 10 milliGRAM(s) Oral daily  melatonin 3 milliGRAM(s) Oral at bedtime  metoprolol tartrate 12.5 milliGRAM(s) Oral two times a day  minocycline 100 milliGRAM(s) Oral two times a day  multivitamin 1 Tablet(s) Oral daily  pantoprazole    Tablet 40 milliGRAM(s) Oral before breakfast  polyethylene glycol 3350 17 Gram(s) Oral two times a day  predniSONE   Tablet 50 milliGRAM(s) Oral daily  simvastatin 20 milliGRAM(s) Oral at bedtime  tiotropium 18 MICROgram(s) Capsule 1 Capsule(s) Inhalation daily  traMADol 25 milliGRAM(s) Oral once    MEDICATIONS  (PRN):  acetaminophen   Tablet. 650 milliGRAM(s) Oral every 6 hours PRN Mild Pain (1 - 3)  bisacodyl Suppository 10 milliGRAM(s) Rectal daily PRN Constipation  bismuth subsalicylate Liquid 30 milliLiter(s) Oral every 6 hours PRN Cramping/abd pain  magnesium hydroxide Suspension 30 milliLiter(s) Oral daily PRN Constipation  senna 2 Tablet(s) Oral at bedtime PRN Constipation  simethicone 80 milliGRAM(s) Chew two times a day PRN Gas  traMADol 25 milliGRAM(s) Oral three times a day PRN Moderate Pain (4 - 6)      Care Discussed with Consultants/Other Providers [x] YES  [ ] NO    HEALTH ISSUES - PROBLEM Dx:  Urticaria: Urticaria  Joint infection: Joint infection  Other problems related to medical facilities and other health care: Other problems related to medical facilities and other health care  S/P TAVR (transcatheter aortic valve replacement): S/P TAVR (transcatheter aortic valve replacement)  Acute cystitis without hematuria: Acute cystitis without hematuria  Wound infection: Wound infection  Acute appendicitis, unspecified acute appendicitis type: Acute appendicitis, unspecified acute appendicitis type  Fever, unspecified fever cause: Fever, unspecified fever cause

## 2018-08-09 NOTE — PROGRESS NOTE ADULT - ASSESSMENT
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 Pt completed 10 days meropenem. patient developed multiple wounds, including abdomen and bl thighs. biopsies collected and not consistent with PG. infection, vasculitis and malignancy ruled out. tissue culture grew coag neg staph likely colonized, swab for afb and fungal cx neg.    # Abd wound, multiple bl thigh wounds  wounds improving on steroids started 8/18  appreciate derm recs, would not change to steroid sparing agent as responding, to cont steroids until outpt fu with derm  leukocytosis likely 2/2 steroids  working diagnosis likely pyoderma gangrenosum vs panniculitis as steroid responsive, despite negative biopsies  continue vac to abdomen, rt Knee, appreciate wound care recs, can continue wound care at rehab/outpt  cleared for discharge by plastics    # Pain control  cont dilaudid 2mg po bid w dressing changes, avoid IV pain medications  tramadol for mod pain, tylenol po prn  gabapentin 300mg tid  bowel regimen    # TKR wound-mrsa  chronic minocycline suppression  no acute infectious process, cleared by ID for DC    # chr Anemia  stable, monitor h/h closely    # S/P TAVR (transcatheter aortic valve replacement)  daily pt/inr, daily coumadin dosing    # adjustment disorder with depressed mood  xanax prn    dispo:   wounds improving with steroids, likely to continue steroids for extended duration, outpt fu with derm  discharge planning has been difficult,  refusing discharge to rehab until wounds heal  dw administration, plan is for derm/ wound care from [facility] to evaluate patient in the hospital and to discuss with family if wounds are appropriate to be cared for in a rehab setting 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 Pt completed 10 days meropenem. patient developed multiple wounds, including abdomen and bl thighs. biopsies collected and not consistent with PG. infection, vasculitis and malignancy ruled out. tissue culture grew coag neg staph likely colonized, swab for afb and fungal cx neg.    # Abd wound, multiple bl thigh wounds  wounds improving on steroids started 8/18  appreciate derm recs, would not change to steroid sparing agent as responding, to cont steroids until outpt fu with derm  leukocytosis likely 2/2 steroids  working diagnosis likely pyoderma gangrenosum vs panniculitis as steroid responsive, despite negative biopsies  continue vac to abdomen, rt Knee, appreciate wound care recs, can continue wound care at rehab/outpt  cleared for discharge by plastics    # Pain control  cont dilaudid 2mg po bid w dressing changes, avoid IV pain medications  tramadol for mod pain, tylenol po prn  gabapentin 300mg tid  bowel regimen    # TKR wound-mrsa  chronic minocycline suppression  no acute infectious process, cleared by ID for DC    # chr Anemia  stable, monitor h/h closely    # S/P TAVR (transcatheter aortic valve replacement)  daily pt/inr, daily coumadin dosing  cont BB, statin    # adjustment disorder with depressed mood  xanax prn    dispo:   wounds improving with steroids, likely to continue steroids for extended duration, outpt fu with derm  discharge planning has been difficult,  refusing discharge to rehab until wounds heal  dw administration, plan is for derm/ wound care from [facility] to evaluate patient in the hospital and to discuss with family if wounds are appropriate to be cared for in a rehab setting

## 2018-08-10 LAB
HCT VFR BLD CALC: 32.1 % — LOW (ref 34.5–45)
HGB BLD-MCNC: 9.5 G/DL — LOW (ref 11.5–15.5)
INR BLD: 5.29 RATIO — CRITICAL HIGH (ref 0.88–1.16)
MCHC RBC-ENTMCNC: 25.8 PG — LOW (ref 27–34)
MCHC RBC-ENTMCNC: 29.6 GM/DL — LOW (ref 32–36)
MCV RBC AUTO: 87.2 FL — SIGNIFICANT CHANGE UP (ref 80–100)
PLATELET # BLD AUTO: 189 K/UL — SIGNIFICANT CHANGE UP (ref 150–400)
PROTHROM AB SERPL-ACNC: 59.1 SEC — HIGH (ref 9.8–12.7)
RBC # BLD: 3.68 M/UL — LOW (ref 3.8–5.2)
RBC # FLD: 17.9 % — HIGH (ref 10.3–14.5)
WBC # BLD: 10.31 K/UL — SIGNIFICANT CHANGE UP (ref 3.8–10.5)
WBC # FLD AUTO: 10.31 K/UL — SIGNIFICANT CHANGE UP (ref 3.8–10.5)

## 2018-08-10 PROCEDURE — 99232 SBSQ HOSP IP/OBS MODERATE 35: CPT

## 2018-08-10 RX ORDER — HYDROMORPHONE HYDROCHLORIDE 2 MG/ML
2 INJECTION INTRAMUSCULAR; INTRAVENOUS; SUBCUTANEOUS ONCE
Qty: 0 | Refills: 0 | Status: DISCONTINUED | OUTPATIENT
Start: 2018-08-10 | End: 2018-08-10

## 2018-08-10 RX ADMIN — Medication 1 APPLICATION(S): at 06:03

## 2018-08-10 RX ADMIN — Medication 650 MILLIGRAM(S): at 13:30

## 2018-08-10 RX ADMIN — Medication 1 MILLIGRAM(S): at 13:26

## 2018-08-10 RX ADMIN — TIOTROPIUM BROMIDE 1 CAPSULE(S): 18 CAPSULE ORAL; RESPIRATORY (INHALATION) at 13:27

## 2018-08-10 RX ADMIN — MINOCYCLINE HYDROCHLORIDE 100 MILLIGRAM(S): 45 TABLET, EXTENDED RELEASE ORAL at 17:21

## 2018-08-10 RX ADMIN — Medication 12.5 MILLIGRAM(S): at 06:02

## 2018-08-10 RX ADMIN — BUDESONIDE AND FORMOTEROL FUMARATE DIHYDRATE 2 PUFF(S): 160; 4.5 AEROSOL RESPIRATORY (INHALATION) at 06:04

## 2018-08-10 RX ADMIN — Medication 2 TABLET(S): at 06:02

## 2018-08-10 RX ADMIN — GABAPENTIN 300 MILLIGRAM(S): 400 CAPSULE ORAL at 13:27

## 2018-08-10 RX ADMIN — Medication 50 MILLIGRAM(S): at 13:27

## 2018-08-10 RX ADMIN — Medication 81 MILLIGRAM(S): at 13:26

## 2018-08-10 RX ADMIN — HYDROMORPHONE HYDROCHLORIDE 2 MILLIGRAM(S): 2 INJECTION INTRAMUSCULAR; INTRAVENOUS; SUBCUTANEOUS at 09:47

## 2018-08-10 RX ADMIN — Medication 1 TABLET(S): at 13:27

## 2018-08-10 RX ADMIN — Medication 1 APPLICATION(S): at 17:21

## 2018-08-10 RX ADMIN — Medication 500 MILLIGRAM(S): at 13:26

## 2018-08-10 RX ADMIN — Medication 1 APPLICATION(S): at 09:48

## 2018-08-10 RX ADMIN — TRAMADOL HYDROCHLORIDE 25 MILLIGRAM(S): 50 TABLET ORAL at 21:41

## 2018-08-10 RX ADMIN — Medication 2 TABLET(S): at 13:26

## 2018-08-10 RX ADMIN — LORATADINE 10 MILLIGRAM(S): 10 TABLET ORAL at 13:26

## 2018-08-10 RX ADMIN — GABAPENTIN 300 MILLIGRAM(S): 400 CAPSULE ORAL at 21:44

## 2018-08-10 RX ADMIN — PANTOPRAZOLE SODIUM 40 MILLIGRAM(S): 20 TABLET, DELAYED RELEASE ORAL at 06:02

## 2018-08-10 RX ADMIN — BUDESONIDE AND FORMOTEROL FUMARATE DIHYDRATE 2 PUFF(S): 160; 4.5 AEROSOL RESPIRATORY (INHALATION) at 17:23

## 2018-08-10 RX ADMIN — Medication 1 APPLICATION(S): at 21:45

## 2018-08-10 RX ADMIN — MINOCYCLINE HYDROCHLORIDE 100 MILLIGRAM(S): 45 TABLET, EXTENDED RELEASE ORAL at 06:02

## 2018-08-10 RX ADMIN — TRAMADOL HYDROCHLORIDE 25 MILLIGRAM(S): 50 TABLET ORAL at 20:51

## 2018-08-10 RX ADMIN — Medication 2 TABLET(S): at 21:44

## 2018-08-10 RX ADMIN — GABAPENTIN 300 MILLIGRAM(S): 400 CAPSULE ORAL at 06:02

## 2018-08-10 RX ADMIN — Medication 650 MILLIGRAM(S): at 14:10

## 2018-08-10 RX ADMIN — SIMVASTATIN 20 MILLIGRAM(S): 20 TABLET, FILM COATED ORAL at 21:44

## 2018-08-10 RX ADMIN — Medication 12.5 MILLIGRAM(S): at 17:21

## 2018-08-10 RX ADMIN — Medication 3 MILLIGRAM(S): at 21:44

## 2018-08-10 NOTE — PROGRESS NOTE ADULT - SUBJECTIVE AND OBJECTIVE BOX
Patient is a 77y old  Female who presents with a chief complaint of fever, lethargy (07 Jun 2018 22:19)      SUBJECTIVE / OVERNIGHT EVENTS:    Patient seen and examined. denies cp sob. pt tells me she is eager to go to rehab.      Vital Signs Last 24 Hrs  T(C): 36.4 (10 Aug 2018 07:28), Max: 36.8 (10 Aug 2018 05:30)  T(F): 97.5 (10 Aug 2018 07:28), Max: 98.2 (10 Aug 2018 05:30)  HR: 66 (10 Aug 2018 07:28) (60 - 70)  BP: 168/83 (10 Aug 2018 07:28) (129/83 - 168/83)  BP(mean): --  RR: 18 (10 Aug 2018 07:28) (16 - 18)  SpO2: 99% (10 Aug 2018 07:28) (95% - 99%)  I&O's Summary    09 Aug 2018 07:01  -  10 Aug 2018 07:00  --------------------------------------------------------  IN: 920 mL / OUT: 200 mL / NET: 720 mL    10 Aug 2018 07:01  -  10 Aug 2018 10:13  --------------------------------------------------------  IN: 240 mL / OUT: 0 mL / NET: 240 mL        PE:  GENERAL: NAD, AAOx3, obese  HEAD:  Atraumatic, Normocephalic  EYES: EOMI, PERRLA, conjunctiva and sclera clear  NECK: Supple, No JVD  CHEST/LUNG: CTABL, No wheeze  HEART: Regular rate and rhythm; no murmur  ABDOMEN: Soft, Nontender, Nondistended; Bowel sounds present, + wound vac abd  EXTREMITIES:  2+ Peripheral Pulses, No clubbing, cyanosis, or edema, right knee wound vac  SKIN: right lateral thigh dressing, right medial thigh dressing, left medial thigh dressing  NEURO: No focal deficits      LABS:                        9.5    10.31 )-----------( 189      ( 10 Aug 2018 08:00 )             32.1           PT/INR - ( 10 Aug 2018 07:21 )   PT: 59.1 sec;   INR: 5.29 ratio           CAPILLARY BLOOD GLUCOSE                RADIOLOGY & ADDITIONAL TESTS:    Imaging Personally Reviewed:  [x] YES  [ ] NO    Consultant(s) Notes Reviewed:  [x] YES  [ ] NO    MEDICATIONS  (STANDING):  acetaminophen  IVPB. 1000 milliGRAM(s) IV Intermittent once  ascorbic acid 500 milliGRAM(s) Oral daily  aspirin enteric coated 81 milliGRAM(s) Oral daily  betamethasone valerate 0.1% Ointment 1 Application(s) Topical two times a day  buDESOnide 160 MICROgram(s)/formoterol 4.5 MICROgram(s) Inhaler 2 Puff(s) Inhalation two times a day  calcium carbonate  625 mG + Vitamin D (OsCal 250 + D) 2 Tablet(s) Oral three times a day  Dakins Solution - 1/4 Strength 1 Application(s) Topical two times a day  folic acid 1 milliGRAM(s) Oral daily  gabapentin 300 milliGRAM(s) Oral three times a day  loratadine 10 milliGRAM(s) Oral daily  melatonin 3 milliGRAM(s) Oral at bedtime  metoprolol tartrate 12.5 milliGRAM(s) Oral two times a day  minocycline 100 milliGRAM(s) Oral two times a day  multivitamin 1 Tablet(s) Oral daily  pantoprazole    Tablet 40 milliGRAM(s) Oral before breakfast  polyethylene glycol 3350 17 Gram(s) Oral two times a day  predniSONE   Tablet 50 milliGRAM(s) Oral daily  simvastatin 20 milliGRAM(s) Oral at bedtime  tiotropium 18 MICROgram(s) Capsule 1 Capsule(s) Inhalation daily  traMADol 25 milliGRAM(s) Oral once    MEDICATIONS  (PRN):  acetaminophen   Tablet. 650 milliGRAM(s) Oral every 6 hours PRN Mild Pain (1 - 3)  bisacodyl Suppository 10 milliGRAM(s) Rectal daily PRN Constipation  bismuth subsalicylate Liquid 30 milliLiter(s) Oral every 6 hours PRN Cramping/abd pain  magnesium hydroxide Suspension 30 milliLiter(s) Oral daily PRN Constipation  senna 2 Tablet(s) Oral at bedtime PRN Constipation  simethicone 80 milliGRAM(s) Chew two times a day PRN Gas  traMADol 25 milliGRAM(s) Oral three times a day PRN Moderate Pain (4 - 6)      Care Discussed with Consultants/Other Providers [x] YES  [ ] NO    HEALTH ISSUES - PROBLEM Dx:  Urticaria: Urticaria  Joint infection: Joint infection  Other problems related to medical facilities and other health care: Other problems related to medical facilities and other health care  S/P TAVR (transcatheter aortic valve replacement): S/P TAVR (transcatheter aortic valve replacement)  Acute cystitis without hematuria: Acute cystitis without hematuria  Wound infection: Wound infection  Acute appendicitis, unspecified acute appendicitis type: Acute appendicitis, unspecified acute appendicitis type  Fever, unspecified fever cause: Fever, unspecified fever cause

## 2018-08-10 NOTE — PROGRESS NOTE ADULT - SUBJECTIVE AND OBJECTIVE BOX
Plastic Surgery  Daily Progress Note     Subjective/24 Hour Events:  Patient seen and examined.   Dressing changed today. Patient tolerated with no issues.     Objective:    Allergies:  amoxicillin (Other)  IV Contrast (Flushing (Skin); Nausea)      Meds:   acetaminophen   Tablet. 650 milliGRAM(s) Oral every 6 hours PRN  acetaminophen  IVPB. 1000 milliGRAM(s) IV Intermittent once  ascorbic acid 500 milliGRAM(s) Oral daily  aspirin enteric coated 81 milliGRAM(s) Oral daily  betamethasone valerate 0.1% Ointment 1 Application(s) Topical two times a day  bisacodyl Suppository 10 milliGRAM(s) Rectal daily PRN  bismuth subsalicylate Liquid 30 milliLiter(s) Oral every 6 hours PRN  buDESOnide 160 MICROgram(s)/formoterol 4.5 MICROgram(s) Inhaler 2 Puff(s) Inhalation two times a day  calcium carbonate  625 mG + Vitamin D (OsCal 250 + D) 2 Tablet(s) Oral three times a day  Dakins Solution - 1/4 Strength 1 Application(s) Topical two times a day  folic acid 1 milliGRAM(s) Oral daily  gabapentin 300 milliGRAM(s) Oral three times a day  loratadine 10 milliGRAM(s) Oral daily  magnesium hydroxide Suspension 30 milliLiter(s) Oral daily PRN  melatonin 3 milliGRAM(s) Oral at bedtime  metoprolol tartrate 12.5 milliGRAM(s) Oral two times a day  minocycline 100 milliGRAM(s) Oral two times a day  multivitamin 1 Tablet(s) Oral daily  pantoprazole    Tablet 40 milliGRAM(s) Oral before breakfast  polyethylene glycol 3350 17 Gram(s) Oral two times a day  predniSONE   Tablet 50 milliGRAM(s) Oral daily  senna 2 Tablet(s) Oral at bedtime PRN  simethicone 80 milliGRAM(s) Chew two times a day PRN  simvastatin 20 milliGRAM(s) Oral at bedtime  tiotropium 18 MICROgram(s) Capsule 1 Capsule(s) Inhalation daily  traMADol 25 milliGRAM(s) Oral once  traMADol 25 milliGRAM(s) Oral three times a day PRN      Vitals:  T(C): 36.4 (08-10-18 @ 07:28), Max: 36.8 (08-10-18 @ 05:30)  HR: 66 (08-10-18 @ 07:28) (60 - 70)  BP: 168/83 (08-10-18 @ 07:28) (129/83 - 168/83)  RR: 18 (08-10-18 @ 07:28) (16 - 18)  SpO2: 99% (08-10-18 @ 07:28) (95% - 99%)    I/O:     08-09-18 @ 07:01  -  08-10-18 @ 07:00  --------------------------------------------------------  IN: 920 mL / OUT: 200 mL / NET: 720 mL    08-10-18 @ 07:01  -  08-10-18 @ 11:04  --------------------------------------------------------  IN: 240 mL / OUT: 0 mL / NET: 240 mL        Physical Exam:   Gen: NAD, comfortable with dressing change, laying in bed.   Abd: central abdominal vac changed, wound healing well and kimber. R knee vac changed, healing well. Medial R thigh dressing changed, minimal film present. Lateral R thigh dressing changed, similar to prior.        Labs:                               9.5    10.31 )-----------( 189      ( 10 Aug 2018 08:00 )             32.1       Studies:

## 2018-08-10 NOTE — PROGRESS NOTE ADULT - SUBJECTIVE AND OBJECTIVE BOX
pain controlled, no issues    afvss  nad  abdominal wound - vac  RLE  thigh - dressing per wound care teams, lateral wound wet to dry per prs/wc teams  knee wound with vac  min stable ecchymosis on anterior tibia  remainder of wounds dressed by wound care  5/5 ta/ehl/gcs  silt l4-s1  2+ dp pulse    ROS: depressed

## 2018-08-10 NOTE — PROGRESS NOTE ADULT - ASSESSMENT
77F s/p Right total knee insertion of spacer, irrigation and debridement, complex wound closure 3/23; readmitted 6/8 for appendicitis, course has been complicated by multiple wounds most notably R knee, midline abdomen, R medial thigh, R lateral thigh, L medial thigh, L lateral thigh.     Plan:  -Patient with greatly improved tolerance of vac and dressing changes.   -Okay for discharge to rehab from plastics perspective.   -Continue vac/dressing changes M/W/F, next on 8/13.    [facility] Pager: [phone]

## 2018-08-10 NOTE — PROVIDER CONTACT NOTE (CRITICAL VALUE NOTIFICATION) - ACTION/TREATMENT ORDERED:
hold coumadin tonight
Cont on antibiotics for now unless medication needs to be changed. Will put in orders if any changes need to be made.

## 2018-08-10 NOTE — PROGRESS NOTE ADULT - ASSESSMENT
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 Pt completed 10 days meropenem. patient developed multiple wounds, including abdomen and bl thighs. biopsies collected and not consistent with PG. infection, vasculitis and malignancy ruled out. tissue culture grew coag neg staph likely colonized, swab for afb and fungal cx neg.    # Abd wound, multiple bl thigh wounds  wounds improving on steroids started 8/18  appreciate derm recs, would not change to steroid sparing agent as responding, to cont steroids until outpt fu with derm  leukocytosis likely 2/2 steroids  working diagnosis likely pyoderma gangrenosum vs panniculitis as steroid responsive, despite negative biopsies  continue vac to abdomen, rt Knee, appreciate wound care recs, can continue wound care at rehab/outpt  cleared for discharge by plastics    # Pain control  cont dilaudid 2mg po bid w dressing changes, avoid IV pain medications  tramadol for mod pain, tylenol po prn  gabapentin 300mg tid  bowel regimen    # TKR wound-mrsa  chronic minocycline suppression  no acute infectious process, cleared by ID for DC    # chr Anemia  stable, monitor h/h closely    # S/P TAVR (transcatheter aortic valve replacement)  INR supratherapeutic today, hold coumadin dosing  cont BB, statin    Dispo:   wounds improving with steroids, likely to continue steroids for extended duration, outpt fu with derm  discharge planning has been difficult and prolonged,  refusing discharge to rehab until wounds heal which is unrealistic  as per discussion today with  and NP,  requests discharge next Friday to [facility].   Patient is aware she is medically cleared for rehab. Patient understands she needs physical therapy in order to get stronger and we cannot provide that in the hospital setting. 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 Pt completed 10 days meropenem. patient developed multiple wounds, including abdomen and bl thighs. biopsies collected and not consistent with PG. infection, vasculitis and malignancy ruled out. tissue culture grew coag neg staph likely colonized, swab for afb and fungal cx neg.    # Abd wound, multiple bl thigh wounds  wounds improving on steroids started 8/18  appreciate derm recs, would not change to steroid sparing agent as responding, to cont steroids until outpt fu with derm  leukocytosis likely 2/2 steroids  working diagnosis likely pyoderma gangrenosum vs panniculitis as steroid responsive, despite negative biopsies  continue vac to abdomen, rt Knee, appreciate wound care recs, can continue wound care at rehab/outpt  cleared for discharge by plastics    # Pain control  cont dilaudid 2mg po bid w dressing changes, avoid IV pain medications  tramadol for mod pain, tylenol po prn  gabapentin 300mg tid  bowel regimen    # TKR wound-mrsa  chronic minocycline suppression  no acute infectious process, cleared by ID for DC    # chr Anemia  stable, monitor h/h closely    # S/P TAVR (transcatheter aortic valve replacement)  INR supratherapeutic today, hold coumadin dosing  cont BB, statin    Dispo:   wounds improving with steroids, likely to continue steroids for extended duration, outpt fu with derm  discharge planning has been difficult and prolonged,  refusing discharge to rehab until wounds heal which is unrealistic  as per discussion today with  and NP,  requests discharge next Friday to [facility].   I had a discussion with Mr Rdz today [phone], he states he has certain requests in order for him to allow discharge to rehab next friday. This includes 1. patient having a private room at [facility] on the 2nd floor 2. Mr Rdz wants to review all wound care orders himself. He wants to know who will be performing the wound care at rehab 3. He wants confirmation that [facility] has all the wound care supplies that will be needed to perform wound care.   Patient is aware she is medically cleared for rehab. Patient understands she needs physical therapy in order to get stronger and we cannot provide that in the hospital setting.

## 2018-08-11 LAB
HCT VFR BLD CALC: 31.6 % — LOW (ref 34.5–45)
HGB BLD-MCNC: 9.4 G/DL — LOW (ref 11.5–15.5)
INR BLD: 4.08 RATIO — HIGH (ref 0.88–1.16)
MCHC RBC-ENTMCNC: 26 PG — LOW (ref 27–34)
MCHC RBC-ENTMCNC: 29.7 GM/DL — LOW (ref 32–36)
MCV RBC AUTO: 87.3 FL — SIGNIFICANT CHANGE UP (ref 80–100)
PLATELET # BLD AUTO: 194 K/UL — SIGNIFICANT CHANGE UP (ref 150–400)
PROTHROM AB SERPL-ACNC: 47.4 SEC — HIGH (ref 10–13.1)
RBC # BLD: 3.62 M/UL — LOW (ref 3.8–5.2)
RBC # FLD: 18 % — HIGH (ref 10.3–14.5)
WBC # BLD: 10.12 K/UL — SIGNIFICANT CHANGE UP (ref 3.8–10.5)
WBC # FLD AUTO: 10.12 K/UL — SIGNIFICANT CHANGE UP (ref 3.8–10.5)

## 2018-08-11 RX ORDER — HYDROMORPHONE HYDROCHLORIDE 2 MG/ML
2 INJECTION INTRAMUSCULAR; INTRAVENOUS; SUBCUTANEOUS ONCE
Qty: 0 | Refills: 0 | Status: DISCONTINUED | OUTPATIENT
Start: 2018-08-11 | End: 2018-08-11

## 2018-08-11 RX ADMIN — TIOTROPIUM BROMIDE 1 CAPSULE(S): 18 CAPSULE ORAL; RESPIRATORY (INHALATION) at 12:10

## 2018-08-11 RX ADMIN — Medication 2 TABLET(S): at 13:32

## 2018-08-11 RX ADMIN — TRAMADOL HYDROCHLORIDE 25 MILLIGRAM(S): 50 TABLET ORAL at 21:50

## 2018-08-11 RX ADMIN — HYDROMORPHONE HYDROCHLORIDE 2 MILLIGRAM(S): 2 INJECTION INTRAMUSCULAR; INTRAVENOUS; SUBCUTANEOUS at 11:30

## 2018-08-11 RX ADMIN — Medication 1 APPLICATION(S): at 21:21

## 2018-08-11 RX ADMIN — TRAMADOL HYDROCHLORIDE 25 MILLIGRAM(S): 50 TABLET ORAL at 21:11

## 2018-08-11 RX ADMIN — BUDESONIDE AND FORMOTEROL FUMARATE DIHYDRATE 2 PUFF(S): 160; 4.5 AEROSOL RESPIRATORY (INHALATION) at 06:30

## 2018-08-11 RX ADMIN — BUDESONIDE AND FORMOTEROL FUMARATE DIHYDRATE 2 PUFF(S): 160; 4.5 AEROSOL RESPIRATORY (INHALATION) at 18:10

## 2018-08-11 RX ADMIN — MINOCYCLINE HYDROCHLORIDE 100 MILLIGRAM(S): 45 TABLET, EXTENDED RELEASE ORAL at 18:11

## 2018-08-11 RX ADMIN — Medication 1 MILLIGRAM(S): at 12:08

## 2018-08-11 RX ADMIN — SIMVASTATIN 20 MILLIGRAM(S): 20 TABLET, FILM COATED ORAL at 21:11

## 2018-08-11 RX ADMIN — Medication 12.5 MILLIGRAM(S): at 06:28

## 2018-08-11 RX ADMIN — GABAPENTIN 300 MILLIGRAM(S): 400 CAPSULE ORAL at 21:11

## 2018-08-11 RX ADMIN — Medication 1 APPLICATION(S): at 06:29

## 2018-08-11 RX ADMIN — Medication 650 MILLIGRAM(S): at 23:01

## 2018-08-11 RX ADMIN — HYDROMORPHONE HYDROCHLORIDE 2 MILLIGRAM(S): 2 INJECTION INTRAMUSCULAR; INTRAVENOUS; SUBCUTANEOUS at 10:32

## 2018-08-11 RX ADMIN — MINOCYCLINE HYDROCHLORIDE 100 MILLIGRAM(S): 45 TABLET, EXTENDED RELEASE ORAL at 06:28

## 2018-08-11 RX ADMIN — Medication 2 TABLET(S): at 21:11

## 2018-08-11 RX ADMIN — Medication 500 MILLIGRAM(S): at 12:08

## 2018-08-11 RX ADMIN — Medication 81 MILLIGRAM(S): at 12:08

## 2018-08-11 RX ADMIN — GABAPENTIN 300 MILLIGRAM(S): 400 CAPSULE ORAL at 13:32

## 2018-08-11 RX ADMIN — Medication 650 MILLIGRAM(S): at 22:30

## 2018-08-11 RX ADMIN — Medication 50 MILLIGRAM(S): at 12:09

## 2018-08-11 RX ADMIN — Medication 3 MILLIGRAM(S): at 21:11

## 2018-08-11 RX ADMIN — PANTOPRAZOLE SODIUM 40 MILLIGRAM(S): 20 TABLET, DELAYED RELEASE ORAL at 06:29

## 2018-08-11 RX ADMIN — Medication 2 TABLET(S): at 06:28

## 2018-08-11 RX ADMIN — Medication 1 TABLET(S): at 12:09

## 2018-08-11 RX ADMIN — Medication 1 APPLICATION(S): at 11:58

## 2018-08-11 RX ADMIN — GABAPENTIN 300 MILLIGRAM(S): 400 CAPSULE ORAL at 06:29

## 2018-08-11 RX ADMIN — LORATADINE 10 MILLIGRAM(S): 10 TABLET ORAL at 12:08

## 2018-08-11 RX ADMIN — Medication 12.5 MILLIGRAM(S): at 18:11

## 2018-08-11 NOTE — PROGRESS NOTE ADULT - ASSESSMENT
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 Pt completed 10 days meropenem. patient developed multiple wounds, including abdomen and bl thighs. biopsies collected and not consistent with PG. infection, vasculitis and malignancy ruled out. tissue culture grew coag neg staph likely colonized, swab for afb and fungal cx neg.    # Abd wound, multiple bl thigh wounds  wounds improving on steroids started 8/18  appreciate derm recs, would not change to steroid sparing agent as responding, to cont steroids until outpt fu with derm  working diagnosis likely pyoderma gangrenosum vs panniculitis as steroid responsive, despite negative biopsies  continue vac to abdomen, rt Knee, appreciate wound care recs, can continue wound care at rehab/outpt  cleared for discharge by plastics    # Pain control  cont dilaudid 2mg po bid w dressing changes, avoid IV pain medications  tramadol for mod pain, tylenol po prn  gabapentin 300mg tid  bowel regimen    # TKR wound-mrsa  chronic minocycline suppression  no acute infectious process, cleared by ID for DC    # S/P TAVR (transcatheter aortic valve replacement)  INR supratherapeutic today, hold coumadin dosing  cont BB, statin    Dispo:   wounds improving with steroids, likely to continue steroids for extended duration, outpt fu with derm  plan is for discharge Friday  Mr Rdz requests a private room at [facility] on the second floor  I informed him I do not have control over this and he should speak with [facility]

## 2018-08-11 NOTE — PROGRESS NOTE ADULT - SUBJECTIVE AND OBJECTIVE BOX
Patient is a 77y old  Female who presents with a chief complaint of fever, lethargy (07 Jun 2018 22:19)      SUBJECTIVE / OVERNIGHT EVENTS:    Patient seen and examined. denies cp sob. no acute events.      Vital Signs Last 24 Hrs  T(C): 36.5 (11 Aug 2018 06:28), Max: 36.9 (10 Aug 2018 15:36)  T(F): 97.7 (11 Aug 2018 06:28), Max: 98.5 (10 Aug 2018 15:36)  HR: 61 (11 Aug 2018 06:28) (61 - 67)  BP: 163/84 (11 Aug 2018 06:28) (125/70 - 163/84)  BP(mean): --  RR: 17 (11 Aug 2018 06:28) (16 - 17)  SpO2: 99% (11 Aug 2018 06:28) (96% - 99%)  I&O's Summary    10 Aug 2018 07:01  -  11 Aug 2018 07:00  --------------------------------------------------------  IN: 480 mL / OUT: 250 mL / NET: 230 mL        PE:  GENERAL: NAD, AAOx3, obese  HEAD:  Atraumatic, Normocephalic  EYES: EOMI, PERRLA, conjunctiva and sclera clear  NECK: Supple, No JVD  CHEST/LUNG: CTABL, No wheeze  HEART: Regular rate and rhythm; no murmur  ABDOMEN: Soft, Nontender, Nondistended; Bowel sounds present, + wound vac abd  EXTREMITIES:  2+ Peripheral Pulses, No clubbing, cyanosis, or edema, right knee wound vac  SKIN: right lateral thigh dressing, right medial thigh dressing, left medial thigh dressing  NEURO: No focal deficits    LABS:                        9.4    10.12 )-----------( 194      ( 11 Aug 2018 08:42 )             31.6           PT/INR - ( 11 Aug 2018 08:44 )   PT: 47.4 sec;   INR: 4.08 ratio           CAPILLARY BLOOD GLUCOSE                RADIOLOGY & ADDITIONAL TESTS:    Imaging Personally Reviewed:  [x] YES  [ ] NO    Consultant(s) Notes Reviewed:  [x] YES  [ ] NO    MEDICATIONS  (STANDING):  acetaminophen  IVPB. 1000 milliGRAM(s) IV Intermittent once  ascorbic acid 500 milliGRAM(s) Oral daily  aspirin enteric coated 81 milliGRAM(s) Oral daily  betamethasone valerate 0.1% Ointment 1 Application(s) Topical two times a day  buDESOnide 160 MICROgram(s)/formoterol 4.5 MICROgram(s) Inhaler 2 Puff(s) Inhalation two times a day  calcium carbonate  625 mG + Vitamin D (OsCal 250 + D) 2 Tablet(s) Oral three times a day  Dakins Solution - 1/4 Strength 1 Application(s) Topical two times a day  folic acid 1 milliGRAM(s) Oral daily  gabapentin 300 milliGRAM(s) Oral three times a day  loratadine 10 milliGRAM(s) Oral daily  melatonin 3 milliGRAM(s) Oral at bedtime  metoprolol tartrate 12.5 milliGRAM(s) Oral two times a day  minocycline 100 milliGRAM(s) Oral two times a day  multivitamin 1 Tablet(s) Oral daily  pantoprazole    Tablet 40 milliGRAM(s) Oral before breakfast  polyethylene glycol 3350 17 Gram(s) Oral two times a day  predniSONE   Tablet 50 milliGRAM(s) Oral daily  simvastatin 20 milliGRAM(s) Oral at bedtime  tiotropium 18 MICROgram(s) Capsule 1 Capsule(s) Inhalation daily  traMADol 25 milliGRAM(s) Oral once    MEDICATIONS  (PRN):  acetaminophen   Tablet. 650 milliGRAM(s) Oral every 6 hours PRN Mild Pain (1 - 3)  bisacodyl Suppository 10 milliGRAM(s) Rectal daily PRN Constipation  bismuth subsalicylate Liquid 30 milliLiter(s) Oral every 6 hours PRN Cramping/abd pain  magnesium hydroxide Suspension 30 milliLiter(s) Oral daily PRN Constipation  senna 2 Tablet(s) Oral at bedtime PRN Constipation  simethicone 80 milliGRAM(s) Chew two times a day PRN Gas  traMADol 25 milliGRAM(s) Oral three times a day PRN Moderate Pain (4 - 6)      Care Discussed with Consultants/Other Providers [x] YES  [ ] NO    HEALTH ISSUES - PROBLEM Dx:  Urticaria: Urticaria  Joint infection: Joint infection  Other problems related to medical facilities and other health care: Other problems related to medical facilities and other health care  S/P TAVR (transcatheter aortic valve replacement): S/P TAVR (transcatheter aortic valve replacement)  Acute cystitis without hematuria: Acute cystitis without hematuria  Wound infection: Wound infection  Acute appendicitis, unspecified acute appendicitis type: Acute appendicitis, unspecified acute appendicitis type  Fever, unspecified fever cause: Fever, unspecified fever cause

## 2018-08-12 LAB
INR BLD: 3.1 RATIO — HIGH (ref 0.88–1.16)
PROTHROM AB SERPL-ACNC: 34.3 SEC — HIGH (ref 9.8–12.7)

## 2018-08-12 RX ORDER — WARFARIN SODIUM 2.5 MG/1
1 TABLET ORAL ONCE
Qty: 0 | Refills: 0 | Status: COMPLETED | OUTPATIENT
Start: 2018-08-12 | End: 2018-08-12

## 2018-08-12 RX ADMIN — Medication 500 MILLIGRAM(S): at 12:45

## 2018-08-12 RX ADMIN — MINOCYCLINE HYDROCHLORIDE 100 MILLIGRAM(S): 45 TABLET, EXTENDED RELEASE ORAL at 06:29

## 2018-08-12 RX ADMIN — WARFARIN SODIUM 1 MILLIGRAM(S): 2.5 TABLET ORAL at 21:29

## 2018-08-12 RX ADMIN — BUDESONIDE AND FORMOTEROL FUMARATE DIHYDRATE 2 PUFF(S): 160; 4.5 AEROSOL RESPIRATORY (INHALATION) at 17:07

## 2018-08-12 RX ADMIN — Medication 650 MILLIGRAM(S): at 23:00

## 2018-08-12 RX ADMIN — Medication 2 TABLET(S): at 06:29

## 2018-08-12 RX ADMIN — GABAPENTIN 300 MILLIGRAM(S): 400 CAPSULE ORAL at 06:29

## 2018-08-12 RX ADMIN — Medication 2 TABLET(S): at 21:29

## 2018-08-12 RX ADMIN — Medication 50 MILLIGRAM(S): at 12:47

## 2018-08-12 RX ADMIN — LORATADINE 10 MILLIGRAM(S): 10 TABLET ORAL at 12:46

## 2018-08-12 RX ADMIN — Medication 1 APPLICATION(S): at 10:05

## 2018-08-12 RX ADMIN — TRAMADOL HYDROCHLORIDE 25 MILLIGRAM(S): 50 TABLET ORAL at 10:00

## 2018-08-12 RX ADMIN — Medication 81 MILLIGRAM(S): at 12:45

## 2018-08-12 RX ADMIN — SIMVASTATIN 20 MILLIGRAM(S): 20 TABLET, FILM COATED ORAL at 21:30

## 2018-08-12 RX ADMIN — GABAPENTIN 300 MILLIGRAM(S): 400 CAPSULE ORAL at 14:04

## 2018-08-12 RX ADMIN — TIOTROPIUM BROMIDE 1 CAPSULE(S): 18 CAPSULE ORAL; RESPIRATORY (INHALATION) at 12:47

## 2018-08-12 RX ADMIN — TRAMADOL HYDROCHLORIDE 25 MILLIGRAM(S): 50 TABLET ORAL at 21:29

## 2018-08-12 RX ADMIN — Medication 3 MILLIGRAM(S): at 21:29

## 2018-08-12 RX ADMIN — BUDESONIDE AND FORMOTEROL FUMARATE DIHYDRATE 2 PUFF(S): 160; 4.5 AEROSOL RESPIRATORY (INHALATION) at 06:30

## 2018-08-12 RX ADMIN — Medication 12.5 MILLIGRAM(S): at 06:29

## 2018-08-12 RX ADMIN — GABAPENTIN 300 MILLIGRAM(S): 400 CAPSULE ORAL at 21:30

## 2018-08-12 RX ADMIN — MINOCYCLINE HYDROCHLORIDE 100 MILLIGRAM(S): 45 TABLET, EXTENDED RELEASE ORAL at 17:08

## 2018-08-12 RX ADMIN — TRAMADOL HYDROCHLORIDE 25 MILLIGRAM(S): 50 TABLET ORAL at 22:05

## 2018-08-12 RX ADMIN — Medication 1 MILLIGRAM(S): at 12:46

## 2018-08-12 RX ADMIN — Medication 650 MILLIGRAM(S): at 22:37

## 2018-08-12 RX ADMIN — Medication 12.5 MILLIGRAM(S): at 17:08

## 2018-08-12 RX ADMIN — Medication 1 APPLICATION(S): at 06:30

## 2018-08-12 RX ADMIN — Medication 1 APPLICATION(S): at 22:10

## 2018-08-12 RX ADMIN — PANTOPRAZOLE SODIUM 40 MILLIGRAM(S): 20 TABLET, DELAYED RELEASE ORAL at 06:30

## 2018-08-12 RX ADMIN — Medication 1 APPLICATION(S): at 17:07

## 2018-08-12 RX ADMIN — Medication 2 TABLET(S): at 14:03

## 2018-08-12 RX ADMIN — Medication 1 TABLET(S): at 12:46

## 2018-08-12 NOTE — PROGRESS NOTE ADULT - SUBJECTIVE AND OBJECTIVE BOX
Patient is a 77y old  Female who presents with a chief complaint of fever, lethargy (07 Jun 2018 22:19)      SUBJECTIVE / OVERNIGHT EVENTS:    Patient seen and examined. denies cp sob nvd. no acute events.      Vital Signs Last 24 Hrs  T(C): 36.8 (12 Aug 2018 06:21), Max: 36.8 (12 Aug 2018 06:21)  T(F): 98.2 (12 Aug 2018 06:21), Max: 98.2 (12 Aug 2018 06:21)  HR: 64 (12 Aug 2018 06:21) (60 - 64)  BP: 168/80 (12 Aug 2018 06:21) (130/83 - 168/80)  BP(mean): --  RR: 18 (12 Aug 2018 06:21) (18 - 18)  SpO2: 98% (12 Aug 2018 06:21) (95% - 98%)  I&O's Summary    11 Aug 2018 07:01  -  12 Aug 2018 07:00  --------------------------------------------------------  IN: 240 mL / OUT: 0 mL / NET: 240 mL        PE:  GENERAL: NAD, AAOx3, obese  HEAD:  Atraumatic, Normocephalic  EYES: EOMI, PERRLA, conjunctiva and sclera clear  NECK: Supple, No JVD  CHEST/LUNG: CTABL, No wheeze  HEART: Regular rate and rhythm; no murmur  ABDOMEN: Soft, Nontender, Nondistended; Bowel sounds present, + wound vac abd  EXTREMITIES:  2+ Peripheral Pulses, No clubbing, cyanosis, or edema, right knee wound vac  SKIN: right lateral thigh dressing, right medial thigh dressing, left medial thigh dressing  NEURO: No focal deficits    LABS:                        9.4    10.12 )-----------( 194      ( 11 Aug 2018 08:42 )             31.6           PT/INR - ( 12 Aug 2018 07:31 )   PT: 34.3 sec;   INR: 3.10 ratio           CAPILLARY BLOOD GLUCOSE                RADIOLOGY & ADDITIONAL TESTS:    Imaging Personally Reviewed:  [x] YES  [ ] NO    Consultant(s) Notes Reviewed:  [x] YES  [ ] NO    MEDICATIONS  (STANDING):  acetaminophen  IVPB. 1000 milliGRAM(s) IV Intermittent once  ascorbic acid 500 milliGRAM(s) Oral daily  aspirin enteric coated 81 milliGRAM(s) Oral daily  betamethasone valerate 0.1% Ointment 1 Application(s) Topical two times a day  buDESOnide 160 MICROgram(s)/formoterol 4.5 MICROgram(s) Inhaler 2 Puff(s) Inhalation two times a day  calcium carbonate  625 mG + Vitamin D (OsCal 250 + D) 2 Tablet(s) Oral three times a day  Dakins Solution - 1/4 Strength 1 Application(s) Topical two times a day  folic acid 1 milliGRAM(s) Oral daily  gabapentin 300 milliGRAM(s) Oral three times a day  loratadine 10 milliGRAM(s) Oral daily  melatonin 3 milliGRAM(s) Oral at bedtime  metoprolol tartrate 12.5 milliGRAM(s) Oral two times a day  minocycline 100 milliGRAM(s) Oral two times a day  multivitamin 1 Tablet(s) Oral daily  pantoprazole    Tablet 40 milliGRAM(s) Oral before breakfast  polyethylene glycol 3350 17 Gram(s) Oral two times a day  predniSONE   Tablet 50 milliGRAM(s) Oral daily  simvastatin 20 milliGRAM(s) Oral at bedtime  tiotropium 18 MICROgram(s) Capsule 1 Capsule(s) Inhalation daily  traMADol 25 milliGRAM(s) Oral once    MEDICATIONS  (PRN):  acetaminophen   Tablet. 650 milliGRAM(s) Oral every 6 hours PRN Mild Pain (1 - 3)  bisacodyl Suppository 10 milliGRAM(s) Rectal daily PRN Constipation  bismuth subsalicylate Liquid 30 milliLiter(s) Oral every 6 hours PRN Cramping/abd pain  magnesium hydroxide Suspension 30 milliLiter(s) Oral daily PRN Constipation  senna 2 Tablet(s) Oral at bedtime PRN Constipation  simethicone 80 milliGRAM(s) Chew two times a day PRN Gas  traMADol 25 milliGRAM(s) Oral three times a day PRN Moderate Pain (4 - 6)      Care Discussed with Consultants/Other Providers [x] YES  [ ] NO    HEALTH ISSUES - PROBLEM Dx:  Urticaria: Urticaria  Joint infection: Joint infection  Other problems related to medical facilities and other health care: Other problems related to medical facilities and other health care  S/P TAVR (transcatheter aortic valve replacement): S/P TAVR (transcatheter aortic valve replacement)  Acute cystitis without hematuria: Acute cystitis without hematuria  Wound infection: Wound infection  Acute appendicitis, unspecified acute appendicitis type: Acute appendicitis, unspecified acute appendicitis type  Fever, unspecified fever cause: Fever, unspecified fever cause

## 2018-08-12 NOTE — PROGRESS NOTE ADULT - ASSESSMENT
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 Pt completed 10 days meropenem. patient developed multiple wounds, including abdomen and bl thighs. biopsies collected and not consistent with PG. infection, vasculitis and malignancy ruled out. tissue culture grew coag neg staph likely colonized, swab for afb and fungal cx neg.    # Abd wound, multiple bl thigh wounds  wounds improving on steroids started 8/18  appreciate derm recs, would not change to steroid sparing agent as responding, to cont steroids until outpt fu with derm  working diagnosis likely pyoderma gangrenosum vs panniculitis as steroid responsive, despite negative biopsies  continue vac to abdomen, rt Knee, appreciate wound care recs, can continue wound care at rehab/outpt  cleared for discharge by plastics    # Pain control  cont dilaudid 2mg po bid w dressing changes, avoid IV pain medications  tramadol for mod pain, tylenol po prn  gabapentin 300mg tid  bowel regimen    # TKR wound-mrsa  chronic minocycline suppression  no acute infectious process, cleared by ID for DC    # S/P TAVR (transcatheter aortic valve replacement)  INR supratherapeutic today, hold coumadin dosing  cont BB, statin    Dispo:   wounds improving with steroids, likely to continue steroids for extended duration, outpt fu with derm  plan is for discharge Friday  Mr Rdz requests a private room at [facility] [place] on the second floor  I informed him I do not have control over this and he should speak with [facility] [place]

## 2018-08-13 LAB
INR BLD: 2.26 RATIO — HIGH (ref 0.88–1.16)
PROTHROM AB SERPL-ACNC: 25 SEC — HIGH (ref 9.8–12.7)

## 2018-08-13 RX ORDER — ACETAMINOPHEN 500 MG
650 TABLET ORAL ONCE
Qty: 0 | Refills: 0 | Status: COMPLETED | OUTPATIENT
Start: 2018-08-13 | End: 2018-08-13

## 2018-08-13 RX ORDER — WARFARIN SODIUM 2.5 MG/1
1 TABLET ORAL ONCE
Qty: 0 | Refills: 0 | Status: COMPLETED | OUTPATIENT
Start: 2018-08-13 | End: 2018-08-13

## 2018-08-13 RX ADMIN — Medication 1 APPLICATION(S): at 09:49

## 2018-08-13 RX ADMIN — GABAPENTIN 300 MILLIGRAM(S): 400 CAPSULE ORAL at 06:40

## 2018-08-13 RX ADMIN — TRAMADOL HYDROCHLORIDE 25 MILLIGRAM(S): 50 TABLET ORAL at 09:50

## 2018-08-13 RX ADMIN — Medication 12.5 MILLIGRAM(S): at 06:40

## 2018-08-13 RX ADMIN — TRAMADOL HYDROCHLORIDE 25 MILLIGRAM(S): 50 TABLET ORAL at 20:58

## 2018-08-13 RX ADMIN — Medication 2 TABLET(S): at 20:59

## 2018-08-13 RX ADMIN — Medication 1 TABLET(S): at 13:11

## 2018-08-13 RX ADMIN — GABAPENTIN 300 MILLIGRAM(S): 400 CAPSULE ORAL at 21:00

## 2018-08-13 RX ADMIN — TRAMADOL HYDROCHLORIDE 25 MILLIGRAM(S): 50 TABLET ORAL at 13:45

## 2018-08-13 RX ADMIN — Medication 650 MILLIGRAM(S): at 22:10

## 2018-08-13 RX ADMIN — Medication 3 MILLIGRAM(S): at 21:01

## 2018-08-13 RX ADMIN — PANTOPRAZOLE SODIUM 40 MILLIGRAM(S): 20 TABLET, DELAYED RELEASE ORAL at 06:40

## 2018-08-13 RX ADMIN — Medication 650 MILLIGRAM(S): at 22:03

## 2018-08-13 RX ADMIN — WARFARIN SODIUM 1 MILLIGRAM(S): 2.5 TABLET ORAL at 21:01

## 2018-08-13 RX ADMIN — MINOCYCLINE HYDROCHLORIDE 100 MILLIGRAM(S): 45 TABLET, EXTENDED RELEASE ORAL at 18:25

## 2018-08-13 RX ADMIN — GABAPENTIN 300 MILLIGRAM(S): 400 CAPSULE ORAL at 13:10

## 2018-08-13 RX ADMIN — SIMETHICONE 80 MILLIGRAM(S): 80 TABLET, CHEWABLE ORAL at 21:01

## 2018-08-13 RX ADMIN — BUDESONIDE AND FORMOTEROL FUMARATE DIHYDRATE 2 PUFF(S): 160; 4.5 AEROSOL RESPIRATORY (INHALATION) at 06:40

## 2018-08-13 RX ADMIN — MINOCYCLINE HYDROCHLORIDE 100 MILLIGRAM(S): 45 TABLET, EXTENDED RELEASE ORAL at 06:40

## 2018-08-13 RX ADMIN — TRAMADOL HYDROCHLORIDE 25 MILLIGRAM(S): 50 TABLET ORAL at 14:30

## 2018-08-13 RX ADMIN — TRAMADOL HYDROCHLORIDE 25 MILLIGRAM(S): 50 TABLET ORAL at 10:35

## 2018-08-13 RX ADMIN — Medication 1 APPLICATION(S): at 21:02

## 2018-08-13 RX ADMIN — Medication 2 TABLET(S): at 13:11

## 2018-08-13 RX ADMIN — Medication 500 MILLIGRAM(S): at 13:09

## 2018-08-13 RX ADMIN — Medication 1 APPLICATION(S): at 18:23

## 2018-08-13 RX ADMIN — Medication 81 MILLIGRAM(S): at 13:09

## 2018-08-13 RX ADMIN — Medication 12.5 MILLIGRAM(S): at 18:25

## 2018-08-13 RX ADMIN — Medication 2 TABLET(S): at 06:40

## 2018-08-13 RX ADMIN — Medication 1 MILLIGRAM(S): at 13:10

## 2018-08-13 RX ADMIN — Medication 50 MILLIGRAM(S): at 13:10

## 2018-08-13 RX ADMIN — TIOTROPIUM BROMIDE 1 CAPSULE(S): 18 CAPSULE ORAL; RESPIRATORY (INHALATION) at 13:12

## 2018-08-13 RX ADMIN — SIMVASTATIN 20 MILLIGRAM(S): 20 TABLET, FILM COATED ORAL at 21:02

## 2018-08-13 RX ADMIN — BUDESONIDE AND FORMOTEROL FUMARATE DIHYDRATE 2 PUFF(S): 160; 4.5 AEROSOL RESPIRATORY (INHALATION) at 18:22

## 2018-08-13 RX ADMIN — Medication 1 APPLICATION(S): at 13:08

## 2018-08-13 RX ADMIN — LORATADINE 10 MILLIGRAM(S): 10 TABLET ORAL at 13:10

## 2018-08-13 NOTE — PROGRESS NOTE ADULT - ASSESSMENT
Morbid Obesity  PMR  Failed RT TKA with initial strep infection followed by MRSA infected spacer.S/P spacer removal.  Spontaneous areas of fat necrosis, pyoderma gangrenosum vs. panniculitis as per Derm, improving with steroids  Fungal/AFB cultures remain negative   Afebrile  No clinical signs of infection  Suggest:  Continue chronic suppressive Minocycline for R knee spacer MRSA infection  Steroid trial as per derm  Local wound care, VAC  Plastics and wound care notes appreciated  Stable from ID viewpoint  disposition per other services, reviewed with Dr Marrero  Endpoint of [facility] open ended, would like to extend it long term after discharge

## 2018-08-13 NOTE — CHART NOTE - NSCHARTNOTEFT_GEN_A_CORE
Pt seen for malnutrition follow up. Pt admitted c acute appendicitis (completed antibiotics), abdominal wound and multiple luann. leg wounds, noted wound VAC continues to be in place, noted Dermatology and plastic surgery following. Interim events noted. Noted discharge planning for Friday to rehab.     Source: Patient [ x]    Family [ ]     other [x ] Aide Lissa at bedside    Diet : Regular diet with nepro 2 x daily, howard 2 packets daily       Patient denies N+V, last BM yesterday.      PO intake:  Per aide and pt, pt with ongoing improved po intake, pt will take ~50% of meals dependent on selections and pt enjoys snacking on cheddar cheese provided. Pt with take 2 nepro daily and no longer takes howard packets.         Current Weight: 215.3 lbs (6/27), 214.2 lbs (8/8), wt stable  % Weight Change    Pertinent Medications: MEDICATIONS  (STANDING):  acetaminophen  IVPB. 1000 milliGRAM(s) IV Intermittent once  ascorbic acid 500 milliGRAM(s) Oral daily  aspirin enteric coated 81 milliGRAM(s) Oral daily  betamethasone valerate 0.1% Ointment 1 Application(s) Topical two times a day  buDESOnide 160 MICROgram(s)/formoterol 4.5 MICROgram(s) Inhaler 2 Puff(s) Inhalation two times a day  calcium carbonate  625 mG + Vitamin D (OsCal 250 + D) 2 Tablet(s) Oral three times a day  Dakins Solution - 1/4 Strength 1 Application(s) Topical two times a day  folic acid 1 milliGRAM(s) Oral daily  gabapentin 300 milliGRAM(s) Oral three times a day  loratadine 10 milliGRAM(s) Oral daily  melatonin 3 milliGRAM(s) Oral at bedtime  metoprolol tartrate 12.5 milliGRAM(s) Oral two times a day  minocycline 100 milliGRAM(s) Oral two times a day  multivitamin 1 Tablet(s) Oral daily  pantoprazole    Tablet 40 milliGRAM(s) Oral before breakfast  polyethylene glycol 3350 17 Gram(s) Oral two times a day  predniSONE   Tablet 50 milliGRAM(s) Oral daily  simvastatin 20 milliGRAM(s) Oral at bedtime  tiotropium 18 MICROgram(s) Capsule 1 Capsule(s) Inhalation daily  traMADol 25 milliGRAM(s) Oral once    MEDICATIONS  (PRN):  acetaminophen   Tablet. 650 milliGRAM(s) Oral every 6 hours PRN Mild Pain (1 - 3)  bisacodyl Suppository 10 milliGRAM(s) Rectal daily PRN Constipation  bismuth subsalicylate Liquid 30 milliLiter(s) Oral every 6 hours PRN Cramping/abd pain  magnesium hydroxide Suspension 30 milliLiter(s) Oral daily PRN Constipation  senna 2 Tablet(s) Oral at bedtime PRN Constipation  simethicone 80 milliGRAM(s) Chew two times a day PRN Gas  traMADol 25 milliGRAM(s) Oral three times a day PRN Moderate Pain (4 - 6)    Pertinent Labs:        Skin: No edema, skin free of pressure injures-multiple wounds noted     Estimated Needs:   [x ] no change since previous assessment  [ ] recalculated:       Previous Nutrition Diagnosis:     [x ] Malnutrition (moderate)         Nutrition Diagnosis is improved addressed with supplements         New Nutrition Diagnosis: [ ] not applicable         Interventions:     Recommend    1. Continue regular diet with nepro 2 x daily as ordered, consider discontinuing Howard per pt preference  2. Continue to encourage po intake and obtain/honor food preferences as able         Monitoring and Evaluation:     [x ] PO intake [x ] Tolerance to diet prescription [x] weights [ x] follow up per protocol    [ ] other:

## 2018-08-13 NOTE — PROGRESS NOTE ADULT - ASSESSMENT
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 Pt completed 10 days meropenem. patient developed multiple wounds, including abdomen and bl thighs. biopsies collected and not consistent with PG. infection, vasculitis and malignancy ruled out. tissue culture grew coag neg staph likely colonized, swab for afb and fungal cx neg.    # Abd wound, multiple bl thigh wounds  wounds improving on steroids started 8/18  to cont steroids until outpt fu with derm  working diagnosis likely pyoderma gangrenosum as steroid responsive, despite negative biopsies  continue vac to abdomen, rt Knee, appreciate wound care recs, to continue wound care at rehab/outpt  cleared for discharge by plastics    # Pain control  avoid IV pain medications  tramadol 25mg tid for mod pain, Iv tylenol prn  can give dilaudid 2mg prn if unable to tolerate dressing changes or wound vac changes  gabapentin 300mg tid  bowel regimen    # TKR wound-mrsa  chronic minocycline suppression  no acute infectious process, cleared by ID for DC, dw Dr. Mai    # S/P TAVR (transcatheter aortic valve replacement)  daily coumadin dosinh  cont BB, statin    Dispo:   wounds improving with steroids, likely to continue steroids for extended duration, outpt fu with derm  as previously discussed with Mr Rdz, plan is for discharge Friday  Mr Rdz requests to review wound care orders.   Mr Rdz requests a private room at [facility] on the second floor. I informed him I do not have control over this and he should speak with [facility] or administration.

## 2018-08-13 NOTE — PROGRESS NOTE ADULT - SUBJECTIVE AND OBJECTIVE BOX
Patient is a 77y old  Female who presents with a chief complaint of fever, lethargy (07 Jun 2018 22:19)      SUBJECTIVE / OVERNIGHT EVENTS:    Patient seen and examined. eager for rehab. denies complaints      Vital Signs Last 24 Hrs  T(C): 36.3 (13 Aug 2018 07:19), Max: 36.8 (12 Aug 2018 21:00)  T(F): 97.3 (13 Aug 2018 07:19), Max: 98.3 (12 Aug 2018 21:00)  HR: 59 (13 Aug 2018 07:19) (59 - 66)  BP: 146/83 (13 Aug 2018 07:19) (139/87 - 146/83)  BP(mean): --  RR: 18 (13 Aug 2018 07:19) (18 - 18)  SpO2: 99% (13 Aug 2018 07:19) (92% - 99%)  I&O's Summary    12 Aug 2018 07:01  -  13 Aug 2018 07:00  --------------------------------------------------------  IN: 600 mL / OUT: 0 mL / NET: 600 mL    13 Aug 2018 07:01  -  13 Aug 2018 10:49  --------------------------------------------------------  IN: 240 mL / OUT: 0 mL / NET: 240 mL        PE:  GENERAL: NAD, AAOx3, obese  HEAD:  Atraumatic, Normocephalic  EYES: EOMI, PERRLA, conjunctiva and sclera clear  NECK: Supple, No JVD  CHEST/LUNG: CTABL, No wheeze  HEART: Regular rate and rhythm; no murmur  ABDOMEN: Soft, Nontender, Nondistended; Bowel sounds present, + wound vac abd  EXTREMITIES:  2+ Peripheral Pulses, No clubbing, cyanosis, or edema, right knee wound vac  SKIN: right lateral thigh dressing, right medial thigh dressing, left medial thigh dressing  NEURO: No focal deficits    LABS:          PT/INR - ( 13 Aug 2018 07:13 )   PT: 25.0 sec;   INR: 2.26 ratio           CAPILLARY BLOOD GLUCOSE                RADIOLOGY & ADDITIONAL TESTS:    Imaging Personally Reviewed:  [x] YES  [ ] NO    Consultant(s) Notes Reviewed:  [x] YES  [ ] NO    MEDICATIONS  (STANDING):  acetaminophen  IVPB. 1000 milliGRAM(s) IV Intermittent once  ascorbic acid 500 milliGRAM(s) Oral daily  aspirin enteric coated 81 milliGRAM(s) Oral daily  betamethasone valerate 0.1% Ointment 1 Application(s) Topical two times a day  buDESOnide 160 MICROgram(s)/formoterol 4.5 MICROgram(s) Inhaler 2 Puff(s) Inhalation two times a day  calcium carbonate  625 mG + Vitamin D (OsCal 250 + D) 2 Tablet(s) Oral three times a day  Dakins Solution - 1/4 Strength 1 Application(s) Topical two times a day  folic acid 1 milliGRAM(s) Oral daily  gabapentin 300 milliGRAM(s) Oral three times a day  loratadine 10 milliGRAM(s) Oral daily  melatonin 3 milliGRAM(s) Oral at bedtime  metoprolol tartrate 12.5 milliGRAM(s) Oral two times a day  minocycline 100 milliGRAM(s) Oral two times a day  multivitamin 1 Tablet(s) Oral daily  pantoprazole    Tablet 40 milliGRAM(s) Oral before breakfast  polyethylene glycol 3350 17 Gram(s) Oral two times a day  predniSONE   Tablet 50 milliGRAM(s) Oral daily  simvastatin 20 milliGRAM(s) Oral at bedtime  tiotropium 18 MICROgram(s) Capsule 1 Capsule(s) Inhalation daily  traMADol 25 milliGRAM(s) Oral once    MEDICATIONS  (PRN):  acetaminophen   Tablet. 650 milliGRAM(s) Oral every 6 hours PRN Mild Pain (1 - 3)  bisacodyl Suppository 10 milliGRAM(s) Rectal daily PRN Constipation  bismuth subsalicylate Liquid 30 milliLiter(s) Oral every 6 hours PRN Cramping/abd pain  magnesium hydroxide Suspension 30 milliLiter(s) Oral daily PRN Constipation  senna 2 Tablet(s) Oral at bedtime PRN Constipation  simethicone 80 milliGRAM(s) Chew two times a day PRN Gas  traMADol 25 milliGRAM(s) Oral three times a day PRN Moderate Pain (4 - 6)      Care Discussed with Consultants/Other Providers [x] YES  [ ] NO    HEALTH ISSUES - PROBLEM Dx:  Urticaria: Urticaria  Joint infection: Joint infection  Other problems related to medical facilities and other health care: Other problems related to medical facilities and other health care  S/P TAVR (transcatheter aortic valve replacement): S/P TAVR (transcatheter aortic valve replacement)  Acute cystitis without hematuria: Acute cystitis without hematuria  Wound infection: Wound infection  Acute appendicitis, unspecified acute appendicitis type: Acute appendicitis, unspecified acute appendicitis type  Fever, unspecified fever cause: Fever, unspecified fever cause

## 2018-08-13 NOTE — PROGRESS NOTE ADULT - SUBJECTIVE AND OBJECTIVE BOX
CC: f/u for MRSA rt knee infection and multiple wounds.    Patient reports: no specific complaints, desire for rehab soon.    REVIEW OF SYSTEMS:  All other review of systems negative (Comprehensive ROS)    Antimicrobials Day # long term :  minocycline 100 milliGRAM(s) Oral two times a day    Other Medications Reviewed    T(F): 97.3 (08-13-18 @ 07:19), Max: 98.3 (08-12-18 @ 21:00)  HR: 59 (08-13-18 @ 07:19)  BP: 146/83 (08-13-18 @ 07:19)  RR: 18 (08-13-18 @ 07:19)  SpO2: 99% (08-13-18 @ 07:19)  Wt(kg): --    PHYSICAL EXAM:  General: alert, no acute distress  Eyes:  anicteric, no conjunctival injection, no discharge  Oropharynx: no lesions or injection 	  Neck: supple, without adenopathy  Lungs: clear to auscultation  Heart: regular rate and rhythm; no murmur, rubs or gallops  Abdomen: soft, nondistended, nontender, without mass or organomegaly  Skin: no lesions  Extremities: no clubbing, cyanosis, or edema  Neurologic: alert, oriented, moves all extremities  RT knee and lower abdominal wounds with vac's in place.Flamk wounds dressed  LAB RESULTS:                        9.4    10.12 )-----------( 194      ( 11 Aug 2018 08:42 )             31.6               MICROBIOLOGY:  RECENT CULTURES:      RADIOLOGY REVIEWED:

## 2018-08-13 NOTE — PROGRESS NOTE ADULT - SUBJECTIVE AND OBJECTIVE BOX
Plastic Surgery  Daily Progress Note     Subjective/24 Hour Events:  Patient seen and examined.  No acute events overnight.   No complaints of pain.   Vac/dressing change later today.   To rehab later this week.     Objective:    Allergies:  amoxicillin (Other)  IV Contrast (Flushing (Skin); Nausea)      Meds:   acetaminophen   Tablet. 650 milliGRAM(s) Oral every 6 hours PRN  acetaminophen  IVPB. 1000 milliGRAM(s) IV Intermittent once  ascorbic acid 500 milliGRAM(s) Oral daily  aspirin enteric coated 81 milliGRAM(s) Oral daily  betamethasone valerate 0.1% Ointment 1 Application(s) Topical two times a day  bisacodyl Suppository 10 milliGRAM(s) Rectal daily PRN  bismuth subsalicylate Liquid 30 milliLiter(s) Oral every 6 hours PRN  buDESOnide 160 MICROgram(s)/formoterol 4.5 MICROgram(s) Inhaler 2 Puff(s) Inhalation two times a day  calcium carbonate  625 mG + Vitamin D (OsCal 250 + D) 2 Tablet(s) Oral three times a day  Dakins Solution - 1/4 Strength 1 Application(s) Topical two times a day  folic acid 1 milliGRAM(s) Oral daily  gabapentin 300 milliGRAM(s) Oral three times a day  loratadine 10 milliGRAM(s) Oral daily  magnesium hydroxide Suspension 30 milliLiter(s) Oral daily PRN  melatonin 3 milliGRAM(s) Oral at bedtime  metoprolol tartrate 12.5 milliGRAM(s) Oral two times a day  minocycline 100 milliGRAM(s) Oral two times a day  multivitamin 1 Tablet(s) Oral daily  pantoprazole    Tablet 40 milliGRAM(s) Oral before breakfast  polyethylene glycol 3350 17 Gram(s) Oral two times a day  predniSONE   Tablet 50 milliGRAM(s) Oral daily  senna 2 Tablet(s) Oral at bedtime PRN  simethicone 80 milliGRAM(s) Chew two times a day PRN  simvastatin 20 milliGRAM(s) Oral at bedtime  tiotropium 18 MICROgram(s) Capsule 1 Capsule(s) Inhalation daily  traMADol 25 milliGRAM(s) Oral once  traMADol 25 milliGRAM(s) Oral three times a day PRN      Vitals:  T(C): 36.3 (08-13-18 @ 07:19), Max: 36.8 (08-12-18 @ 21:00)  HR: 59 (08-13-18 @ 07:19) (59 - 66)  BP: 146/83 (08-13-18 @ 07:19) (128/76 - 146/83)  RR: 18 (08-13-18 @ 07:19) (18 - 18)  SpO2: 99% (08-13-18 @ 07:19) (92% - 99%)    I/O:     08-12-18 @ 07:01  -  08-13-18 @ 07:00  --------------------------------------------------------  IN: 600 mL / OUT: 0 mL / NET: 600 mL        Physical Exam:   Gen: NAD, laying in bed, alert, responding appropriately.   Ext: Vacs holding suction, intact. R thigh dressings cdi.        Labs:                               9.4    10.12 )-----------( 194      ( 11 Aug 2018 08:42 )             31.6       Studies:

## 2018-08-14 LAB
HCT VFR BLD CALC: 31.6 % — LOW (ref 34.5–45)
HGB BLD-MCNC: 9.3 G/DL — LOW (ref 11.5–15.5)
INR BLD: 2.24 RATIO — HIGH (ref 0.88–1.16)
MCHC RBC-ENTMCNC: 25.8 PG — LOW (ref 27–34)
MCHC RBC-ENTMCNC: 29.4 GM/DL — LOW (ref 32–36)
MCV RBC AUTO: 87.8 FL — SIGNIFICANT CHANGE UP (ref 80–100)
PLATELET # BLD AUTO: 214 K/UL — SIGNIFICANT CHANGE UP (ref 150–400)
PROTHROM AB SERPL-ACNC: 24.6 SEC — HIGH (ref 9.8–12.7)
RBC # BLD: 3.6 M/UL — LOW (ref 3.8–5.2)
RBC # FLD: 18.3 % — HIGH (ref 10.3–14.5)
WBC # BLD: 9.55 K/UL — SIGNIFICANT CHANGE UP (ref 3.8–10.5)
WBC # FLD AUTO: 9.55 K/UL — SIGNIFICANT CHANGE UP (ref 3.8–10.5)

## 2018-08-14 RX ORDER — WARFARIN SODIUM 2.5 MG/1
1 TABLET ORAL ONCE
Qty: 0 | Refills: 0 | Status: COMPLETED | OUTPATIENT
Start: 2018-08-14 | End: 2018-08-14

## 2018-08-14 RX ADMIN — SIMVASTATIN 20 MILLIGRAM(S): 20 TABLET, FILM COATED ORAL at 21:03

## 2018-08-14 RX ADMIN — Medication 1 APPLICATION(S): at 18:38

## 2018-08-14 RX ADMIN — Medication 12.5 MILLIGRAM(S): at 18:23

## 2018-08-14 RX ADMIN — BUDESONIDE AND FORMOTEROL FUMARATE DIHYDRATE 2 PUFF(S): 160; 4.5 AEROSOL RESPIRATORY (INHALATION) at 18:23

## 2018-08-14 RX ADMIN — POLYETHYLENE GLYCOL 3350 17 GRAM(S): 17 POWDER, FOR SOLUTION ORAL at 06:35

## 2018-08-14 RX ADMIN — Medication 1 APPLICATION(S): at 06:36

## 2018-08-14 RX ADMIN — Medication 1 TABLET(S): at 12:45

## 2018-08-14 RX ADMIN — LORATADINE 10 MILLIGRAM(S): 10 TABLET ORAL at 12:50

## 2018-08-14 RX ADMIN — PANTOPRAZOLE SODIUM 40 MILLIGRAM(S): 20 TABLET, DELAYED RELEASE ORAL at 06:33

## 2018-08-14 RX ADMIN — Medication 1 MILLIGRAM(S): at 12:50

## 2018-08-14 RX ADMIN — MINOCYCLINE HYDROCHLORIDE 100 MILLIGRAM(S): 45 TABLET, EXTENDED RELEASE ORAL at 18:24

## 2018-08-14 RX ADMIN — Medication 500 MILLIGRAM(S): at 12:45

## 2018-08-14 RX ADMIN — GABAPENTIN 300 MILLIGRAM(S): 400 CAPSULE ORAL at 21:03

## 2018-08-14 RX ADMIN — GABAPENTIN 300 MILLIGRAM(S): 400 CAPSULE ORAL at 06:33

## 2018-08-14 RX ADMIN — Medication 2 TABLET(S): at 21:03

## 2018-08-14 RX ADMIN — Medication 2 TABLET(S): at 14:13

## 2018-08-14 RX ADMIN — Medication 2 TABLET(S): at 06:32

## 2018-08-14 RX ADMIN — Medication 3 MILLIGRAM(S): at 21:03

## 2018-08-14 RX ADMIN — Medication 50 MILLIGRAM(S): at 12:45

## 2018-08-14 RX ADMIN — Medication 1 APPLICATION(S): at 11:20

## 2018-08-14 RX ADMIN — Medication 12.5 MILLIGRAM(S): at 06:33

## 2018-08-14 RX ADMIN — BUDESONIDE AND FORMOTEROL FUMARATE DIHYDRATE 2 PUFF(S): 160; 4.5 AEROSOL RESPIRATORY (INHALATION) at 06:35

## 2018-08-14 RX ADMIN — MINOCYCLINE HYDROCHLORIDE 100 MILLIGRAM(S): 45 TABLET, EXTENDED RELEASE ORAL at 06:33

## 2018-08-14 RX ADMIN — TIOTROPIUM BROMIDE 1 CAPSULE(S): 18 CAPSULE ORAL; RESPIRATORY (INHALATION) at 14:09

## 2018-08-14 RX ADMIN — TRAMADOL HYDROCHLORIDE 25 MILLIGRAM(S): 50 TABLET ORAL at 20:21

## 2018-08-14 RX ADMIN — TRAMADOL HYDROCHLORIDE 25 MILLIGRAM(S): 50 TABLET ORAL at 10:44

## 2018-08-14 RX ADMIN — GABAPENTIN 300 MILLIGRAM(S): 400 CAPSULE ORAL at 14:14

## 2018-08-14 RX ADMIN — Medication 81 MILLIGRAM(S): at 12:45

## 2018-08-14 RX ADMIN — WARFARIN SODIUM 1 MILLIGRAM(S): 2.5 TABLET ORAL at 21:03

## 2018-08-14 RX ADMIN — Medication 1 APPLICATION(S): at 22:00

## 2018-08-14 NOTE — PROGRESS NOTE ADULT - ASSESSMENT
8/14 pt looking much better and sitting up in the chair. Hemoglobin was stable at 9.3 and wounds healing with steroids and wound vacs.

## 2018-08-14 NOTE — PROGRESS NOTE ADULT - ASSESSMENT
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 Pt completed 10 days meropenem. patient developed multiple wounds, including abdomen and bl thighs. biopsies collected and not consistent with PG. infection, vasculitis and malignancy ruled out. tissue culture grew coag neg staph likely colonized, swab for afb and fungal cx neg.    # Abd wound, multiple bl thigh wounds  wounds improving on steroids started 8/18  to cont steroids until outpt fu with derm  working diagnosis likely pyoderma gangrenosum as steroid responsive, despite negative biopsies  continue vac to abdomen, rt Knee, appreciate wound care recs, to continue wound care at rehab/outpt  cleared for discharge by plastics    # Pain control  avoid IV pain medications  tramadol 25mg tid for mod pain, Iv tylenol prn  can give dilaudid 2mg prn if unable to tolerate dressing changes or wound vac changes  gabapentin 300mg tid  bowel regimen    # TKR wound-mrsa  chronic minocycline suppression  no acute infectious process, cleared by ID for DC, dw Dr. Mai    # S/P TAVR (transcatheter aortic valve replacement)  daily coumadin dosinh  cont BB, statin    Dispo:   wounds improving with steroids, likely to continue steroids for extended duration, outpt fu with derm  as confirmed with Mr Rdz today [phone], plan is for discharge Friday  Mr Rdz requests to review wound care orders.   Mr Rdz requests a private room at [facility] on the second floor. I informed him I do not have control over this and he should speak with [facility] or administration.  I referred him to case management

## 2018-08-14 NOTE — PROGRESS NOTE ADULT - SUBJECTIVE AND OBJECTIVE BOX
Patient is a 77y old  Female who presents with a chief complaint of fever, lethargy (07 Jun 2018 22:19)      SUBJECTIVE / OVERNIGHT EVENTS:    Patient seen and examined. denies complaints. no acute events.      Vital Signs Last 24 Hrs  T(C): 36.7 (14 Aug 2018 10:01), Max: 37.1 (13 Aug 2018 16:49)  T(F): 98.1 (14 Aug 2018 10:01), Max: 98.8 (13 Aug 2018 16:49)  HR: 59 (14 Aug 2018 10:01) (59 - 76)  BP: 131/73 (14 Aug 2018 10:01) (111/73 - 148/80)  BP(mean): --  RR: 18 (14 Aug 2018 10:01) (18 - 18)  SpO2: 95% (14 Aug 2018 10:01) (95% - 98%)  I&O's Summary    13 Aug 2018 07:01  -  14 Aug 2018 07:00  --------------------------------------------------------  IN: 880 mL / OUT: 0 mL / NET: 880 mL        PE:  GENERAL: NAD, AAOx3, obese  HEAD:  Atraumatic, Normocephalic  EYES: EOMI, PERRLA, conjunctiva and sclera clear  NECK: Supple, No JVD  CHEST/LUNG: CTABL, No wheeze  HEART: Regular rate and rhythm; no murmur  ABDOMEN: Soft, Nontender, Nondistended; Bowel sounds present, + wound vac abd  EXTREMITIES:  2+ Peripheral Pulses, No clubbing, cyanosis, or edema, right knee wound vac  SKIN: right lateral thigh dressing, right medial thigh dressing, left medial thigh dressing  NEURO: No focal deficits    LABS:                        9.3    9.55  )-----------( 214      ( 14 Aug 2018 08:04 )             31.6           PT/INR - ( 14 Aug 2018 07:05 )   PT: 24.6 sec;   INR: 2.24 ratio           CAPILLARY BLOOD GLUCOSE                RADIOLOGY & ADDITIONAL TESTS:    Imaging Personally Reviewed:  [x] YES  [ ] NO    Consultant(s) Notes Reviewed:  [x] YES  [ ] NO    MEDICATIONS  (STANDING):  acetaminophen   Tablet. 650 milliGRAM(s) Oral once  acetaminophen  IVPB. 1000 milliGRAM(s) IV Intermittent once  ascorbic acid 500 milliGRAM(s) Oral daily  aspirin enteric coated 81 milliGRAM(s) Oral daily  betamethasone valerate 0.1% Ointment 1 Application(s) Topical two times a day  buDESOnide 160 MICROgram(s)/formoterol 4.5 MICROgram(s) Inhaler 2 Puff(s) Inhalation two times a day  calcium carbonate  625 mG + Vitamin D (OsCal 250 + D) 2 Tablet(s) Oral three times a day  Dakins Solution - 1/4 Strength 1 Application(s) Topical two times a day  folic acid 1 milliGRAM(s) Oral daily  gabapentin 300 milliGRAM(s) Oral three times a day  loratadine 10 milliGRAM(s) Oral daily  melatonin 3 milliGRAM(s) Oral at bedtime  metoprolol tartrate 12.5 milliGRAM(s) Oral two times a day  minocycline 100 milliGRAM(s) Oral two times a day  multivitamin 1 Tablet(s) Oral daily  pantoprazole    Tablet 40 milliGRAM(s) Oral before breakfast  polyethylene glycol 3350 17 Gram(s) Oral two times a day  predniSONE   Tablet 50 milliGRAM(s) Oral daily  simvastatin 20 milliGRAM(s) Oral at bedtime  tiotropium 18 MICROgram(s) Capsule 1 Capsule(s) Inhalation daily  traMADol 25 milliGRAM(s) Oral once    MEDICATIONS  (PRN):  acetaminophen   Tablet. 650 milliGRAM(s) Oral every 6 hours PRN Mild Pain (1 - 3)  bisacodyl Suppository 10 milliGRAM(s) Rectal daily PRN Constipation  bismuth subsalicylate Liquid 30 milliLiter(s) Oral every 6 hours PRN Cramping/abd pain  magnesium hydroxide Suspension 30 milliLiter(s) Oral daily PRN Constipation  senna 2 Tablet(s) Oral at bedtime PRN Constipation  simethicone 80 milliGRAM(s) Chew two times a day PRN Gas  traMADol 25 milliGRAM(s) Oral three times a day PRN Moderate Pain (4 - 6)      Care Discussed with Consultants/Other Providers [x] YES  [ ] NO    HEALTH ISSUES - PROBLEM Dx:  Urticaria: Urticaria  Joint infection: Joint infection  Other problems related to medical facilities and other health care: Other problems related to medical facilities and other health care  S/P TAVR (transcatheter aortic valve replacement): S/P TAVR (transcatheter aortic valve replacement)  Acute cystitis without hematuria: Acute cystitis without hematuria  Wound infection: Wound infection  Acute appendicitis, unspecified acute appendicitis type: Acute appendicitis, unspecified acute appendicitis type  Fever, unspecified fever cause: Fever, unspecified fever cause

## 2018-08-14 NOTE — PROGRESS NOTE ADULT - SUBJECTIVE AND OBJECTIVE BOX
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    Patient was recently admitted from 4/9/18 - 6/6/18. Patient was initially presented for right total knee spacer exchange and I&D with complex wound closure. Previously had a spacer exchanged performed on 3/9 and then was discharged to rehab. Patient then returned on 4/9 with fever and concern for sepsis, found to have positive cultures from knee for MRSA as well as multifocal pneumonia. During admission, patient completed course of Daptomycin for MRSA prosthesis infection and was also treated for multifocal PNA with ?aztreonam. Patient had extensive wound care, followed by Surgery/Plastics/Orthopedics/Id and was discharged to rehab on 6/6 with suppressive minocycline. Admission complicated by lesion over thigh and lower pannus requiring wound vacs.  UA On 6/5 positive, but no culture sent and no antibiotics started.      Patient presents from rehab with fevers and lethargy shortly after discharge. In the ED, Tmax 100.6, HR 85,  /62, O2 98% on 2L NC. Labs showed mild leukocytosis with WBC 13.8, stable anemia Hgb 9.6, and normal platelets 319. Metabolic panel overall unremarkable with Cr 0.89. LFTs unremarkable. VBG with lactate 1.3. UA again positive with >50 WBCs, few bacteria, turbid appearance, and large LE.     Patient had a CT abdomen/pelvis that showed evidence of possible distal appendicitis. Evaluated by Surgery and family declining operative intervention due to high risk. Plastics to aid with wound management, to apply wound vac again in AM to thigh and pannus. Patient not felt to have evidence of wound infection. (07 Jun 2018 22:19)    I saw the pt several times on the last admit. and she had a ferritin that was over 400 and was given at times procrit when the hemoglobin got into the 7 range. The pt has a inflammatory anemia and the hemoglobin now is about 8.6 and she is normocytic hypochromic with a high rdw. Will at this time follow the pt and I see no need now for an extensive workup for this anemia. Will support as needed. 7/12 met with pt and aid at bedside and explained approach to the anemia no need for procrit. 7/16 notes reviewed from the last few days and the hemoglobin was 8.4. 7/18 case discussed with Dr Joseph and steroids may need to be given. the hemoglobin was noted to be 8.9 and epo will be on hold for now.     7/20 was asked by Dr Joseph to comment on the possibility of the pt having coumadin necrosis. It is a very rare occurrence and generally occurs acutely. The cause of coumadin necrosis would occur almost immediately in pts with a low protein s and c level. Both of these are vitamin k dependant factors and with the start of coumadin both of these factors would decrease. The protein s and c are modulators of factor 5 and would on initiation cause local ulcers. Pts like this would also have had clots in the past. The pts is not likely to have this with her history here. Obtaining a protein s and c levels here would not be helpful as these levels will be low on the coumadin. It would take 3 weeks or so off the coumadin for these levels to come up to baseline. Since this a rare case we might consider stopping the warfarin and give the pt a heparin derivative. Antiphospholipid antibody syndrome is not likely in the setting of normal platelets normal ptt in the past and no history of arterial and venous clots, no heart valve clots etc. 7/24 stable and pt to be given steroids, there may be some healing of the wounds noted on ortho exam.    7/30 the notes from the last week were read and reviewed and it would appear that the pt was getting some wound healing. The hemoglobin has been stable and she may be going to rehab soon.  8/6 notes from the week noted and the wounds are apparently healing well now. Hemoglobin was stable.  8/14 pt looking much better and sitting up in the chair. Hemoglobin was stable at 9.3 and wounds healing with steroids and wound vacs.  MEDICATIONS  (STANDING):  acetaminophen   Tablet. 650 milliGRAM(s) Oral once  acetaminophen  IVPB. 1000 milliGRAM(s) IV Intermittent once  ascorbic acid 500 milliGRAM(s) Oral daily  aspirin enteric coated 81 milliGRAM(s) Oral daily  betamethasone valerate 0.1% Ointment 1 Application(s) Topical two times a day  buDESOnide 160 MICROgram(s)/formoterol 4.5 MICROgram(s) Inhaler 2 Puff(s) Inhalation two times a day  calcium carbonate  625 mG + Vitamin D (OsCal 250 + D) 2 Tablet(s) Oral three times a day  Dakins Solution - 1/4 Strength 1 Application(s) Topical two times a day  folic acid 1 milliGRAM(s) Oral daily  gabapentin 300 milliGRAM(s) Oral three times a day  loratadine 10 milliGRAM(s) Oral daily  melatonin 3 milliGRAM(s) Oral at bedtime  metoprolol tartrate 12.5 milliGRAM(s) Oral two times a day  minocycline 100 milliGRAM(s) Oral two times a day  multivitamin 1 Tablet(s) Oral daily  pantoprazole    Tablet 40 milliGRAM(s) Oral before breakfast  polyethylene glycol 3350 17 Gram(s) Oral two times a day  predniSONE   Tablet 50 milliGRAM(s) Oral daily  simvastatin 20 milliGRAM(s) Oral at bedtime  tiotropium 18 MICROgram(s) Capsule 1 Capsule(s) Inhalation daily  traMADol 25 milliGRAM(s) Oral once                    PAST MEDICAL & SURGICAL HISTORY:  CAD (coronary artery disease): HERBERT to LAD 11/2016  Aortic stenosis, severe  Uncomplicated asthma, unspecified asthma severity  Polymyalgia  Lumbar disc disease with radiculopathy  Spider veins  Vital Signs Last 24 Hrs  T(C): 36.8 (06 Aug 2018 11:21), Max: 36.9 (05 Aug 2018 16:09)  T(F): 98.2 (06 Aug 2018 11:21), Max: 98.5 (05 Aug 2018 16:09)  HR: 60 (06 Aug 2018 11:21) (60 - 65)  BP: 147/70 (06 Aug 2018 11:21) (115/72 - 170/89)  BP(mean): --  RR: 18 (06 Aug 2018 11:21) (16 - 18)  SpO2: 98% (06 Aug 2018 11:21) (94% - 98%)Anxiety  Severe aortic stenosis by prior echocardiogram: 2013 and  11/2016  Dysphagia  Macular degeneration  Hiatal hernia  GERD (gastroesophageal reflux disease)  Sciatica  Osteoarthritis  S/P TKR (total knee replacement): joint infection s/p explant and abx spacer on 12/17/17  S/P TAVR (transcatheter aortic valve replacement): 12/22/17  Artificial cardiac pacemaker: 12/25/17  H/O cardiac catheterization: 11/29/16 HERBERT placed on LAD  H/O endoscopy: with dilatation of esophageal stricture 2013  S/P cataract surgery: x2; 3-4yr ago  S/P gastroplasty: 28 yrs ago  S/P cholecystectomy: more than 15 years ago  S/P total knee replacement: left, 15yr ago  S/P total knee replacement: right, 12yr ago  S/P hysterectomy: 24yr ago      SOCIAL HISTORY:    FAMILY HISTORY:  No pertinent family history in first degree relatives      Allergies    amoxicillin (Other)    Intolerances    IV Contrast (Flushing (Skin); Nausea)      PHYSICAL EXAM pt was  sitting up in the chair and was not in distress nulrse at side and looking better PE noted  General: adult in NAD                                   9.3    9.55  )-----------( 214      ( 14 Aug 2018 08:04 )             31.6

## 2018-08-15 LAB
ANION GAP SERPL CALC-SCNC: 11 MMOL/L — SIGNIFICANT CHANGE UP (ref 5–17)
ANION GAP SERPL CALC-SCNC: 12 MMOL/L — SIGNIFICANT CHANGE UP (ref 5–17)
BASOPHILS # BLD AUTO: 0.01 K/UL — SIGNIFICANT CHANGE UP (ref 0–0.2)
BASOPHILS NFR BLD AUTO: 0.1 % — SIGNIFICANT CHANGE UP (ref 0–2)
BUN SERPL-MCNC: 34 MG/DL — HIGH (ref 7–23)
BUN SERPL-MCNC: 34 MG/DL — HIGH (ref 7–23)
CALCIUM SERPL-MCNC: 8.2 MG/DL — LOW (ref 8.4–10.5)
CALCIUM SERPL-MCNC: 8.3 MG/DL — LOW (ref 8.4–10.5)
CHLORIDE SERPL-SCNC: 102 MMOL/L — SIGNIFICANT CHANGE UP (ref 96–108)
CHLORIDE SERPL-SCNC: 103 MMOL/L — SIGNIFICANT CHANGE UP (ref 96–108)
CO2 SERPL-SCNC: 24 MMOL/L — SIGNIFICANT CHANGE UP (ref 22–31)
CO2 SERPL-SCNC: 26 MMOL/L — SIGNIFICANT CHANGE UP (ref 22–31)
CREAT SERPL-MCNC: 0.61 MG/DL — SIGNIFICANT CHANGE UP (ref 0.5–1.3)
CREAT SERPL-MCNC: 0.64 MG/DL — SIGNIFICANT CHANGE UP (ref 0.5–1.3)
EOSINOPHIL # BLD AUTO: 0.06 K/UL — SIGNIFICANT CHANGE UP (ref 0–0.5)
EOSINOPHIL NFR BLD AUTO: 0.6 % — SIGNIFICANT CHANGE UP (ref 0–6)
GLUCOSE SERPL-MCNC: 101 MG/DL — HIGH (ref 70–99)
GLUCOSE SERPL-MCNC: 108 MG/DL — HIGH (ref 70–99)
HCT VFR BLD CALC: 32.2 % — LOW (ref 34.5–45)
HGB BLD-MCNC: 9.6 G/DL — LOW (ref 11.5–15.5)
IMM GRANULOCYTES NFR BLD AUTO: 1 % — SIGNIFICANT CHANGE UP (ref 0–1.5)
INR BLD: 2.37 RATIO — HIGH (ref 0.88–1.16)
LYMPHOCYTES # BLD AUTO: 1.81 K/UL — SIGNIFICANT CHANGE UP (ref 1–3.3)
LYMPHOCYTES # BLD AUTO: 18.7 % — SIGNIFICANT CHANGE UP (ref 13–44)
MCHC RBC-ENTMCNC: 26.2 PG — LOW (ref 27–34)
MCHC RBC-ENTMCNC: 29.8 GM/DL — LOW (ref 32–36)
MCV RBC AUTO: 88 FL — SIGNIFICANT CHANGE UP (ref 80–100)
MONOCYTES # BLD AUTO: 0.54 K/UL — SIGNIFICANT CHANGE UP (ref 0–0.9)
MONOCYTES NFR BLD AUTO: 5.6 % — SIGNIFICANT CHANGE UP (ref 2–14)
NEUTROPHILS # BLD AUTO: 7.16 K/UL — SIGNIFICANT CHANGE UP (ref 1.8–7.4)
NEUTROPHILS NFR BLD AUTO: 74 % — SIGNIFICANT CHANGE UP (ref 43–77)
PLATELET # BLD AUTO: 204 K/UL — SIGNIFICANT CHANGE UP (ref 150–400)
POTASSIUM SERPL-MCNC: 5.1 MMOL/L — SIGNIFICANT CHANGE UP (ref 3.5–5.3)
POTASSIUM SERPL-MCNC: 5.6 MMOL/L — HIGH (ref 3.5–5.3)
POTASSIUM SERPL-SCNC: 5.1 MMOL/L — SIGNIFICANT CHANGE UP (ref 3.5–5.3)
POTASSIUM SERPL-SCNC: 5.6 MMOL/L — HIGH (ref 3.5–5.3)
PROTHROM AB SERPL-ACNC: 26.3 SEC — HIGH (ref 9.8–12.7)
RBC # BLD: 3.66 M/UL — LOW (ref 3.8–5.2)
RBC # FLD: 18.2 % — HIGH (ref 10.3–14.5)
SODIUM SERPL-SCNC: 139 MMOL/L — SIGNIFICANT CHANGE UP (ref 135–145)
SODIUM SERPL-SCNC: 139 MMOL/L — SIGNIFICANT CHANGE UP (ref 135–145)
WBC # BLD: 9.68 K/UL — SIGNIFICANT CHANGE UP (ref 3.8–10.5)
WBC # FLD AUTO: 9.68 K/UL — SIGNIFICANT CHANGE UP (ref 3.8–10.5)

## 2018-08-15 RX ORDER — WARFARIN SODIUM 2.5 MG/1
1 TABLET ORAL ONCE
Qty: 0 | Refills: 0 | Status: COMPLETED | OUTPATIENT
Start: 2018-08-15 | End: 2018-08-15

## 2018-08-15 RX ORDER — SODIUM POLYSTYRENE SULFONATE 4.1 MEQ/G
15 POWDER, FOR SUSPENSION ORAL ONCE
Qty: 0 | Refills: 0 | Status: DISCONTINUED | OUTPATIENT
Start: 2018-08-15 | End: 2018-08-15

## 2018-08-15 RX ADMIN — Medication 1 APPLICATION(S): at 10:15

## 2018-08-15 RX ADMIN — Medication 81 MILLIGRAM(S): at 11:56

## 2018-08-15 RX ADMIN — MINOCYCLINE HYDROCHLORIDE 100 MILLIGRAM(S): 45 TABLET, EXTENDED RELEASE ORAL at 18:27

## 2018-08-15 RX ADMIN — Medication 1 APPLICATION(S): at 06:19

## 2018-08-15 RX ADMIN — MINOCYCLINE HYDROCHLORIDE 100 MILLIGRAM(S): 45 TABLET, EXTENDED RELEASE ORAL at 06:17

## 2018-08-15 RX ADMIN — BUDESONIDE AND FORMOTEROL FUMARATE DIHYDRATE 2 PUFF(S): 160; 4.5 AEROSOL RESPIRATORY (INHALATION) at 18:27

## 2018-08-15 RX ADMIN — Medication 3 MILLIGRAM(S): at 21:47

## 2018-08-15 RX ADMIN — Medication 650 MILLIGRAM(S): at 13:48

## 2018-08-15 RX ADMIN — TRAMADOL HYDROCHLORIDE 25 MILLIGRAM(S): 50 TABLET ORAL at 21:46

## 2018-08-15 RX ADMIN — Medication 12.5 MILLIGRAM(S): at 06:17

## 2018-08-15 RX ADMIN — TRAMADOL HYDROCHLORIDE 25 MILLIGRAM(S): 50 TABLET ORAL at 22:20

## 2018-08-15 RX ADMIN — Medication 650 MILLIGRAM(S): at 14:15

## 2018-08-15 RX ADMIN — TIOTROPIUM BROMIDE 1 CAPSULE(S): 18 CAPSULE ORAL; RESPIRATORY (INHALATION) at 11:57

## 2018-08-15 RX ADMIN — PANTOPRAZOLE SODIUM 40 MILLIGRAM(S): 20 TABLET, DELAYED RELEASE ORAL at 06:17

## 2018-08-15 RX ADMIN — TRAMADOL HYDROCHLORIDE 25 MILLIGRAM(S): 50 TABLET ORAL at 10:45

## 2018-08-15 RX ADMIN — BUDESONIDE AND FORMOTEROL FUMARATE DIHYDRATE 2 PUFF(S): 160; 4.5 AEROSOL RESPIRATORY (INHALATION) at 06:18

## 2018-08-15 RX ADMIN — Medication 2 TABLET(S): at 21:47

## 2018-08-15 RX ADMIN — Medication 2 TABLET(S): at 13:48

## 2018-08-15 RX ADMIN — Medication 1 TABLET(S): at 11:56

## 2018-08-15 RX ADMIN — Medication 500 MILLIGRAM(S): at 11:56

## 2018-08-15 RX ADMIN — GABAPENTIN 300 MILLIGRAM(S): 400 CAPSULE ORAL at 21:47

## 2018-08-15 RX ADMIN — GABAPENTIN 300 MILLIGRAM(S): 400 CAPSULE ORAL at 13:48

## 2018-08-15 RX ADMIN — SIMVASTATIN 20 MILLIGRAM(S): 20 TABLET, FILM COATED ORAL at 21:48

## 2018-08-15 RX ADMIN — WARFARIN SODIUM 1 MILLIGRAM(S): 2.5 TABLET ORAL at 21:46

## 2018-08-15 RX ADMIN — GABAPENTIN 300 MILLIGRAM(S): 400 CAPSULE ORAL at 06:17

## 2018-08-15 RX ADMIN — Medication 12.5 MILLIGRAM(S): at 18:27

## 2018-08-15 RX ADMIN — Medication 2 TABLET(S): at 06:17

## 2018-08-15 RX ADMIN — Medication 1 APPLICATION(S): at 21:48

## 2018-08-15 RX ADMIN — TRAMADOL HYDROCHLORIDE 25 MILLIGRAM(S): 50 TABLET ORAL at 10:14

## 2018-08-15 RX ADMIN — LORATADINE 10 MILLIGRAM(S): 10 TABLET ORAL at 11:56

## 2018-08-15 RX ADMIN — Medication 1 MILLIGRAM(S): at 11:56

## 2018-08-15 RX ADMIN — Medication 1 APPLICATION(S): at 19:07

## 2018-08-15 RX ADMIN — Medication 50 MILLIGRAM(S): at 11:56

## 2018-08-15 NOTE — PROGRESS NOTE ADULT - ASSESSMENT
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 Pt completed 10 days meropenem. patient developed multiple wounds, including abdomen and bl thighs. biopsies collected and not consistent with PG. infection, vasculitis and malignancy ruled out. tissue culture grew coag neg staph likely colonized, swab for afb and fungal cx neg.    # Abd wound, multiple bl thigh wounds  wounds improving on steroids started 8/18  to cont steroids until outpt fu with derm  working diagnosis likely pyoderma gangrenosum as steroid responsive, despite negative biopsies  continue vac to abdomen, rt Knee, appreciate wound care recs, to continue wound care at rehab/outpt  cleared for discharge by plastics    # Pain control  avoid IV pain medications  tramadol 25mg tid for mod pain, Iv tylenol prn  can give dilaudid 2mg prn if unable to tolerate dressing changes or wound vac changes  gabapentin 300mg tid  bowel regimen    # TKR wound-mrsa  chronic minocycline suppression  no acute infectious process, cleared by ID for DC, yasmin Mai    # S/P TAVR (transcatheter aortic valve replacement)  daily coumadin dosinh  cont BB, statin    Dispo:   wounds improving with steroids, likely to continue steroids for extended duration, outpt fu with derm  as yasmin Mr Rdz, plan is for DC Thursday to [facility] pending bed availability  yasmin ROBERSON

## 2018-08-15 NOTE — PROGRESS NOTE ADULT - SUBJECTIVE AND OBJECTIVE BOX
Plastic Surgery  Daily Progress Note     Subjective/24 Hour Events:  Patient seen and examined.   Dressing changed at bedside.   Tolerated well with some pain.   To rehab likely tomorrow.     Objective:    Allergies:  amoxicillin (Other)  IV Contrast (Flushing (Skin); Nausea)      Meds:   acetaminophen   Tablet. 650 milliGRAM(s) Oral every 6 hours PRN  acetaminophen  IVPB. 1000 milliGRAM(s) IV Intermittent once  ascorbic acid 500 milliGRAM(s) Oral daily  aspirin enteric coated 81 milliGRAM(s) Oral daily  betamethasone valerate 0.1% Ointment 1 Application(s) Topical two times a day  bisacodyl Suppository 10 milliGRAM(s) Rectal daily PRN  bismuth subsalicylate Liquid 30 milliLiter(s) Oral every 6 hours PRN  buDESOnide 160 MICROgram(s)/formoterol 4.5 MICROgram(s) Inhaler 2 Puff(s) Inhalation two times a day  calcium carbonate  625 mG + Vitamin D (OsCal 250 + D) 2 Tablet(s) Oral three times a day  Dakins Solution - 1/4 Strength 1 Application(s) Topical two times a day  folic acid 1 milliGRAM(s) Oral daily  gabapentin 300 milliGRAM(s) Oral three times a day  loratadine 10 milliGRAM(s) Oral daily  magnesium hydroxide Suspension 30 milliLiter(s) Oral daily PRN  melatonin 3 milliGRAM(s) Oral at bedtime  metoprolol tartrate 12.5 milliGRAM(s) Oral two times a day  minocycline 100 milliGRAM(s) Oral two times a day  multivitamin 1 Tablet(s) Oral daily  pantoprazole    Tablet 40 milliGRAM(s) Oral before breakfast  polyethylene glycol 3350 17 Gram(s) Oral two times a day  predniSONE   Tablet 50 milliGRAM(s) Oral daily  senna 2 Tablet(s) Oral at bedtime PRN  simethicone 80 milliGRAM(s) Chew two times a day PRN  simvastatin 20 milliGRAM(s) Oral at bedtime  tiotropium 18 MICROgram(s) Capsule 1 Capsule(s) Inhalation daily  traMADol 25 milliGRAM(s) Oral three times a day PRN  traMADol 25 milliGRAM(s) Oral once  warfarin 1 milliGRAM(s) Oral once      Vitals:  T(C): 36.4 (08-15-18 @ 07:51), Max: 36.8 (08-15-18 @ 03:15)  HR: 61 (08-15-18 @ 07:51) (61 - 74)  BP: 136/74 (08-15-18 @ 07:51) (104/66 - 150/82)  RR: 18 (08-15-18 @ 07:51) (18 - 18)  SpO2: 99% (08-15-18 @ 07:51) (94% - 99%)    I/O:     08-14-18 @ 07:01  -  08-15-18 @ 07:00  --------------------------------------------------------  IN: 800 mL / OUT: 0 mL / NET: 800 mL    08-15-18 @ 07:01  -  08-15-18 @ 11:51  --------------------------------------------------------  IN: 200 mL / OUT: 0 mL / NET: 200 mL        Physical Exam:   Gen: NAD, laying in bed, alert, responding appropriately.   Ext: R thigh wound with vac improved, kimber, Abdominal wound with vac kimber, good granulation tissue throughout. No skin erythema or fluid collections. R medial thigh wound with minimal gray discharge, no odor. R lateral thigh wounds unchanged.        Labs:   08-15    139  |  102  |  34<H>  ----------------------------<  108<H>  5.1   |  26  |  0.61    Ca    8.2<L>      15 Aug 2018 09:01                            9.6    9.68  )-----------( 204      ( 15 Aug 2018 07:13 )             32.2       Studies:

## 2018-08-15 NOTE — PROGRESS NOTE ADULT - SUBJECTIVE AND OBJECTIVE BOX
Patient is a 77y old  Female who presents with a chief complaint of fever, lethargy (07 Jun 2018 22:19)      SUBJECTIVE / OVERNIGHT EVENTS:    Patient seen and examined. denies cp sob nvd. no acute events.      Vital Signs Last 24 Hrs  T(C): 36.4 (15 Aug 2018 07:51), Max: 36.8 (15 Aug 2018 03:15)  T(F): 97.6 (15 Aug 2018 07:51), Max: 98.2 (15 Aug 2018 03:15)  HR: 61 (15 Aug 2018 07:51) (61 - 74)  BP: 136/74 (15 Aug 2018 07:51) (104/66 - 150/82)  BP(mean): --  RR: 18 (15 Aug 2018 07:51) (18 - 18)  SpO2: 99% (15 Aug 2018 07:51) (94% - 99%)  I&O's Summary    14 Aug 2018 07:01  -  15 Aug 2018 07:00  --------------------------------------------------------  IN: 800 mL / OUT: 0 mL / NET: 800 mL    15 Aug 2018 07:01  -  15 Aug 2018 13:09  --------------------------------------------------------  IN: 200 mL / OUT: 0 mL / NET: 200 mL        PE:  ENERAL: NAD, AAOx3, obese  HEAD:  Atraumatic, Normocephalic  EYES: EOMI, PERRLA, conjunctiva and sclera clear  NECK: Supple, No JVD  CHEST/LUNG: CTABL, No wheeze  HEART: Regular rate and rhythm; no murmur  ABDOMEN: Soft, Nontender, Nondistended; Bowel sounds present, + wound vac abd  EXTREMITIES:  2+ Peripheral Pulses, No clubbing, cyanosis, or edema, right knee wound vac  SKIN: right lateral thigh dressing, right medial thigh dressing, left medial thigh dressing  NEURO: No focal deficits    LABS:                        9.6    9.68  )-----------( 204      ( 15 Aug 2018 07:13 )             32.2     08-15    139  |  102  |  34<H>  ----------------------------<  108<H>  5.1   |  26  |  0.61    Ca    8.2<L>      15 Aug 2018 09:01      PT/INR - ( 15 Aug 2018 06:53 )   PT: 26.3 sec;   INR: 2.37 ratio           CAPILLARY BLOOD GLUCOSE                RADIOLOGY & ADDITIONAL TESTS:    Imaging Personally Reviewed:  [x] YES  [ ] NO    Consultant(s) Notes Reviewed:  [x] YES  [ ] NO    MEDICATIONS  (STANDING):  acetaminophen  IVPB. 1000 milliGRAM(s) IV Intermittent once  ascorbic acid 500 milliGRAM(s) Oral daily  aspirin enteric coated 81 milliGRAM(s) Oral daily  betamethasone valerate 0.1% Ointment 1 Application(s) Topical two times a day  buDESOnide 160 MICROgram(s)/formoterol 4.5 MICROgram(s) Inhaler 2 Puff(s) Inhalation two times a day  calcium carbonate  625 mG + Vitamin D (OsCal 250 + D) 2 Tablet(s) Oral three times a day  Dakins Solution - 1/4 Strength 1 Application(s) Topical two times a day  folic acid 1 milliGRAM(s) Oral daily  gabapentin 300 milliGRAM(s) Oral three times a day  loratadine 10 milliGRAM(s) Oral daily  melatonin 3 milliGRAM(s) Oral at bedtime  metoprolol tartrate 12.5 milliGRAM(s) Oral two times a day  minocycline 100 milliGRAM(s) Oral two times a day  multivitamin 1 Tablet(s) Oral daily  pantoprazole    Tablet 40 milliGRAM(s) Oral before breakfast  polyethylene glycol 3350 17 Gram(s) Oral two times a day  predniSONE   Tablet 50 milliGRAM(s) Oral daily  simvastatin 20 milliGRAM(s) Oral at bedtime  tiotropium 18 MICROgram(s) Capsule 1 Capsule(s) Inhalation daily  traMADol 25 milliGRAM(s) Oral once  warfarin 1 milliGRAM(s) Oral once    MEDICATIONS  (PRN):  acetaminophen   Tablet. 650 milliGRAM(s) Oral every 6 hours PRN Mild Pain (1 - 3)  bisacodyl Suppository 10 milliGRAM(s) Rectal daily PRN Constipation  bismuth subsalicylate Liquid 30 milliLiter(s) Oral every 6 hours PRN Cramping/abd pain  magnesium hydroxide Suspension 30 milliLiter(s) Oral daily PRN Constipation  senna 2 Tablet(s) Oral at bedtime PRN Constipation  simethicone 80 milliGRAM(s) Chew two times a day PRN Gas  traMADol 25 milliGRAM(s) Oral three times a day PRN Moderate Pain (4 - 6)      Care Discussed with Consultants/Other Providers [x] YES  [ ] NO    HEALTH ISSUES - PROBLEM Dx:  Urticaria: Urticaria  Joint infection: Joint infection  Other problems related to medical facilities and other health care: Other problems related to medical facilities and other health care  S/P TAVR (transcatheter aortic valve replacement): S/P TAVR (transcatheter aortic valve replacement)  Acute cystitis without hematuria: Acute cystitis without hematuria  Wound infection: Wound infection  Acute appendicitis, unspecified acute appendicitis type: Acute appendicitis, unspecified acute appendicitis type  Fever, unspecified fever cause: Fever, unspecified fever cause

## 2018-08-16 VITALS
SYSTOLIC BLOOD PRESSURE: 116 MMHG | DIASTOLIC BLOOD PRESSURE: 70 MMHG | OXYGEN SATURATION: 97 % | RESPIRATION RATE: 18 BRPM | HEART RATE: 64 BPM | TEMPERATURE: 98 F

## 2018-08-16 LAB
INR BLD: 2.43 RATIO — HIGH (ref 0.88–1.16)
PROTHROM AB SERPL-ACNC: 26.7 SEC — HIGH (ref 9.8–12.7)

## 2018-08-16 PROCEDURE — 99232 SBSQ HOSP IP/OBS MODERATE 35: CPT

## 2018-08-16 RX ORDER — SIMETHICONE 80 MG/1
80 TABLET, CHEWABLE ORAL ONCE
Qty: 0 | Refills: 0 | Status: COMPLETED | OUTPATIENT
Start: 2018-08-16 | End: 2018-08-16

## 2018-08-16 RX ORDER — ACETAMINOPHEN 500 MG
2 TABLET ORAL
Qty: 0 | Refills: 0 | COMMUNITY
Start: 2018-08-16

## 2018-08-16 RX ORDER — SIMETHICONE 80 MG/1
1 TABLET, CHEWABLE ORAL
Qty: 0 | Refills: 0 | COMMUNITY
Start: 2018-08-16

## 2018-08-16 RX ORDER — MAGNESIUM HYDROXIDE 400 MG/1
30 TABLET, CHEWABLE ORAL
Qty: 0 | Refills: 0 | COMMUNITY
Start: 2018-08-16

## 2018-08-16 RX ORDER — TRAMADOL HYDROCHLORIDE 50 MG/1
0.5 TABLET ORAL
Qty: 0 | Refills: 0 | COMMUNITY
Start: 2018-08-16

## 2018-08-16 RX ORDER — FERROUS SULFATE 325(65) MG
1 TABLET ORAL
Qty: 0 | Refills: 0 | COMMUNITY

## 2018-08-16 RX ORDER — GABAPENTIN 400 MG/1
1 CAPSULE ORAL
Qty: 0 | Refills: 0 | COMMUNITY
Start: 2018-08-16

## 2018-08-16 RX ORDER — SODIUM HYPOCHLORITE 0.125 %
1 SOLUTION, NON-ORAL MISCELLANEOUS
Qty: 0 | Refills: 0 | COMMUNITY
Start: 2018-08-16

## 2018-08-16 RX ORDER — TRAMADOL HYDROCHLORIDE 50 MG/1
25 TABLET ORAL THREE TIMES A DAY
Qty: 0 | Refills: 0 | Status: DISCONTINUED | OUTPATIENT
Start: 2018-08-16 | End: 2018-08-16

## 2018-08-16 RX ORDER — LORATADINE 10 MG/1
1 TABLET ORAL
Qty: 0 | Refills: 0 | COMMUNITY
Start: 2018-08-16

## 2018-08-16 RX ADMIN — TRAMADOL HYDROCHLORIDE 25 MILLIGRAM(S): 50 TABLET ORAL at 10:04

## 2018-08-16 RX ADMIN — Medication 1 APPLICATION(S): at 11:34

## 2018-08-16 RX ADMIN — LORATADINE 10 MILLIGRAM(S): 10 TABLET ORAL at 11:36

## 2018-08-16 RX ADMIN — SIMETHICONE 80 MILLIGRAM(S): 80 TABLET, CHEWABLE ORAL at 10:04

## 2018-08-16 RX ADMIN — PANTOPRAZOLE SODIUM 40 MILLIGRAM(S): 20 TABLET, DELAYED RELEASE ORAL at 06:32

## 2018-08-16 RX ADMIN — Medication 12.5 MILLIGRAM(S): at 06:32

## 2018-08-16 RX ADMIN — Medication 81 MILLIGRAM(S): at 11:37

## 2018-08-16 RX ADMIN — MINOCYCLINE HYDROCHLORIDE 100 MILLIGRAM(S): 45 TABLET, EXTENDED RELEASE ORAL at 06:32

## 2018-08-16 RX ADMIN — Medication 50 MILLIGRAM(S): at 11:37

## 2018-08-16 RX ADMIN — TIOTROPIUM BROMIDE 1 CAPSULE(S): 18 CAPSULE ORAL; RESPIRATORY (INHALATION) at 11:37

## 2018-08-16 RX ADMIN — GABAPENTIN 300 MILLIGRAM(S): 400 CAPSULE ORAL at 06:32

## 2018-08-16 RX ADMIN — Medication 1 MILLIGRAM(S): at 11:37

## 2018-08-16 RX ADMIN — GABAPENTIN 300 MILLIGRAM(S): 400 CAPSULE ORAL at 13:28

## 2018-08-16 RX ADMIN — Medication 1 TABLET(S): at 11:36

## 2018-08-16 RX ADMIN — Medication 500 MILLIGRAM(S): at 11:37

## 2018-08-16 RX ADMIN — POLYETHYLENE GLYCOL 3350 17 GRAM(S): 17 POWDER, FOR SOLUTION ORAL at 06:32

## 2018-08-16 RX ADMIN — Medication 1 APPLICATION(S): at 06:32

## 2018-08-16 RX ADMIN — Medication 2 TABLET(S): at 13:28

## 2018-08-16 RX ADMIN — Medication 2 TABLET(S): at 06:32

## 2018-08-16 RX ADMIN — BUDESONIDE AND FORMOTEROL FUMARATE DIHYDRATE 2 PUFF(S): 160; 4.5 AEROSOL RESPIRATORY (INHALATION) at 06:32

## 2018-08-16 NOTE — PROGRESS NOTE ADULT - PROVIDER SPECIALTY LIST ADULT
Dermatology
Heme/Onc
Infectious Disease
Internal Medicine
Orthopedics
Plastic Surgery
Surgery
Wound Care
Infectious Disease
Infectious Disease
Internal Medicine
Internal Medicine
Plastic Surgery
Dermatology
Heme/Onc
Infectious Disease
Infectious Disease
Internal Medicine
Plastic Surgery
Wound Care
Wound Care
Dermatology
Internal Medicine

## 2018-08-16 NOTE — PROGRESS NOTE ADULT - SUBJECTIVE AND OBJECTIVE BOX
Patient is a 77y old  Female who presents with a chief complaint of fever, lethargy (07 Jun 2018 22:19)      SUBJECTIVE / OVERNIGHT EVENTS:    Patient seen and examined. co abd discomfort since last night. states she slept overnight without pain. co gas. last BM yesterday. planning for rehab today.      Vital Signs Last 24 Hrs  T(C): 36.4 (16 Aug 2018 06:42), Max: 36.8 (15 Aug 2018 16:53)  T(F): 97.5 (16 Aug 2018 06:42), Max: 98.2 (15 Aug 2018 16:53)  HR: 64 (16 Aug 2018 06:42) (61 - 100)  BP: 156/86 (16 Aug 2018 06:42) (114/70 - 156/86)  BP(mean): --  RR: 18 (16 Aug 2018 06:42) (18 - 18)  SpO2: 98% (16 Aug 2018 06:42) (93% - 98%)  I&O's Summary    15 Aug 2018 07:01  -  16 Aug 2018 07:00  --------------------------------------------------------  IN: 800 mL / OUT: 700 mL / NET: 100 mL        PE:  GENERAL: NAD, AAOx3, obese  HEAD:  Atraumatic, Normocephalic  EYES: EOMI, PERRLA, conjunctiva and sclera clear  NECK: Supple, No JVD  CHEST/LUNG: CTABL, No wheeze  HEART: Regular rate and rhythm; no murmur  ABDOMEN: Soft, Nontender, Nondistended; Bowel sounds present, + wound vac abd  EXTREMITIES:  2+ Peripheral Pulses, No clubbing, cyanosis, or edema, right knee wound vac  SKIN: right lateral thigh dressing, right medial thigh dressing, left medial thigh dressing  NEURO: No focal deficits    LABS:                        9.6    9.68  )-----------( 204      ( 15 Aug 2018 07:13 )             32.2     08-15    139  |  102  |  34<H>  ----------------------------<  108<H>  5.1   |  26  |  0.61    Ca    8.2<L>      15 Aug 2018 09:01      PT/INR - ( 15 Aug 2018 06:53 )   PT: 26.3 sec;   INR: 2.37 ratio           CAPILLARY BLOOD GLUCOSE                RADIOLOGY & ADDITIONAL TESTS:    Imaging Personally Reviewed:  [x] YES  [ ] NO    Consultant(s) Notes Reviewed:  [x] YES  [ ] NO    MEDICATIONS  (STANDING):  acetaminophen  IVPB. 1000 milliGRAM(s) IV Intermittent once  ascorbic acid 500 milliGRAM(s) Oral daily  aspirin enteric coated 81 milliGRAM(s) Oral daily  betamethasone valerate 0.1% Ointment 1 Application(s) Topical two times a day  buDESOnide 160 MICROgram(s)/formoterol 4.5 MICROgram(s) Inhaler 2 Puff(s) Inhalation two times a day  calcium carbonate  625 mG + Vitamin D (OsCal 250 + D) 2 Tablet(s) Oral three times a day  Dakins Solution - 1/4 Strength 1 Application(s) Topical two times a day  folic acid 1 milliGRAM(s) Oral daily  gabapentin 300 milliGRAM(s) Oral three times a day  loratadine 10 milliGRAM(s) Oral daily  melatonin 3 milliGRAM(s) Oral at bedtime  metoprolol tartrate 12.5 milliGRAM(s) Oral two times a day  minocycline 100 milliGRAM(s) Oral two times a day  multivitamin 1 Tablet(s) Oral daily  pantoprazole    Tablet 40 milliGRAM(s) Oral before breakfast  polyethylene glycol 3350 17 Gram(s) Oral two times a day  predniSONE   Tablet 50 milliGRAM(s) Oral daily  simethicone 80 milliGRAM(s) Chew once  simvastatin 20 milliGRAM(s) Oral at bedtime  tiotropium 18 MICROgram(s) Capsule 1 Capsule(s) Inhalation daily  traMADol 25 milliGRAM(s) Oral once    MEDICATIONS  (PRN):  acetaminophen   Tablet. 650 milliGRAM(s) Oral every 6 hours PRN Mild Pain (1 - 3)  bisacodyl Suppository 10 milliGRAM(s) Rectal daily PRN Constipation  bismuth subsalicylate Liquid 30 milliLiter(s) Oral every 6 hours PRN Cramping/abd pain  magnesium hydroxide Suspension 30 milliLiter(s) Oral daily PRN Constipation  senna 2 Tablet(s) Oral at bedtime PRN Constipation  simethicone 80 milliGRAM(s) Chew two times a day PRN Gas      Care Discussed with Consultants/Other Providers [x] YES  [ ] NO    HEALTH ISSUES - PROBLEM Dx:  Urticaria: Urticaria  Joint infection: Joint infection  Other problems related to medical facilities and other health care: Other problems related to medical facilities and other health care  S/P TAVR (transcatheter aortic valve replacement): S/P TAVR (transcatheter aortic valve replacement)  Acute cystitis without hematuria: Acute cystitis without hematuria  Wound infection: Wound infection  Acute appendicitis, unspecified acute appendicitis type: Acute appendicitis, unspecified acute appendicitis type  Fever, unspecified fever cause: Fever, unspecified fever cause

## 2018-08-16 NOTE — PROGRESS NOTE ADULT - SUBJECTIVE AND OBJECTIVE BOX
pain controlled, no issues  she is excited to go to snf today    afvss  nad  abdominal wound - vac  RLE  thigh - dressing per wound care teams, lateral wound wet to dry per prs/wc teams  knee wound with vac  remainder of wounds dressed by wound care  5/5 ta/ehl/gcs  silt l4-s1  2+ dp pulse    ROS: depressed

## 2018-08-16 NOTE — PROGRESS NOTE ADULT - ASSESSMENT
no plan for orthopedic surgical intervention per family and patient preference  vac and wound care per PRS and wound care teams  per derm prednisone to treat possible atypical presentation of pyoderma granulosum, per report improving  PO abx per ID team, minocycline  encourage patient to spend majority of bed oob in bedside chair as able, has now been mobilizing to bedside chair, she must remain 50% wb on the RLE and NO KNEE MOTION allowed, knee immobilizer if oob  dvt ppx per primary  continue to optimize nutrition  continue to involve psych given depression symptoms	  outpatient f/u in office in 4-6 weeks, [phone]

## 2018-08-16 NOTE — CHART NOTE - NSCHARTNOTESELECT_GEN_ALL_CORE
Critical Value/Event Note
Dermatology/Event Note
Dermatology/Event Note
Event Note
Event Note/As per night NP pt expressed to RN  suicidal ideation
Event Note/Coumadin dosing
Nutrition Services
Plastic Surgery/Event Note

## 2018-08-16 NOTE — PROGRESS NOTE ADULT - ASSESSMENT
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 Pt completed 10 days meropenem. patient developed multiple wounds, including abdomen and bl thighs. biopsies collected and not consistent with PG. infection, vasculitis and malignancy ruled out. tissue culture grew coag neg staph likely colonized, swab for afb and fungal cx neg.    # Abd discomfort  abd exam is benign, can try simethicone for gas  patient declined tylenol for pain  last BM yesterday  no indication for further work up    # Abd wound, multiple bl thigh wounds  wounds improving on steroids started 8/18  to cont steroids until outpt fu with derm  working diagnosis likely pyoderma gangrenosum as steroid responsive, despite negative biopsies  continue vac to abdomen, rt Knee, appreciate wound care recs, to continue wound care at rehab/outpt  cleared for discharge by plastics    # Pain control  avoid IV pain medications  tramadol 25mg tid for mod pain, Iv tylenol prn  can give dilaudid 2mg prn if unable to tolerate dressing changes or wound vac changes  gabapentin 300mg tid  bowel regimen    # TKR wound-mrsa  chronic minocycline suppression  no acute infectious process, cleared by ID for DC, dw Dr. Mai    # S/P TAVR (transcatheter aortic valve replacement)  cont asa 81mg, BB, statin    # RLE DVT  dx 1/2018, cont coumadin daily dosing, daily pt/inr    Dispo:   wounds improving with steroids, likely to continue steroids for extended duration, outpt fu with derm  plan is for DC to [facility] pending bed availability

## 2018-08-16 NOTE — CHART NOTE - NSCHARTNOTEFT_GEN_A_CORE
Went to see patient around noon time with primary attending Dr Cerna.  Patient was behaving inappropriately to the physician. Patient did not let the   physician to talk to him. He was violent and verbally abusive .  Asked  him to get   out of his room. While the physician was leaving the room, he immediately followed him  as if he was going to physically attack the physician. One of the staff member came in   between to avoid any physical contact. Physician was calm and handled the situation  very appropriately. Lead physician, nurse manager made aware.   Psych attending also made aware of the situation. Primary nurse was in the room  when this happened.    Nancy Cash NP

## 2018-08-17 ENCOUNTER — INBOUND DOCUMENT (OUTPATIENT)
Age: 78
End: 2018-08-17

## 2018-08-18 LAB
CULTURE RESULTS: SIGNIFICANT CHANGE UP
SPECIMEN SOURCE: SIGNIFICANT CHANGE UP

## 2018-08-20 ENCOUNTER — CHART COPY (OUTPATIENT)
Age: 78
End: 2018-08-20

## 2018-08-21 ENCOUNTER — INBOUND DOCUMENT (OUTPATIENT)
Age: 78
End: 2018-08-21

## 2018-08-22 ENCOUNTER — INBOUND DOCUMENT (OUTPATIENT)
Age: 78
End: 2018-08-22

## 2018-08-23 NOTE — PROVIDER CONTACT NOTE (CRITICAL VALUE NOTIFICATION) - ACTION/TREATMENT ORDERED:
NP made aware. Continue Daptomycin IV
NP made aware. Continue Daptomycin IV
NP made aware. Pt on Vancomycin. Daptomycin order noted.
Monitor patient
Continue to monitor
Give 1 unit PRBC and then repeat CBC
MD states he is aware, continue to monitor
Monitoring continued; safety maintained.
Repeat CBC
DC instructions

## 2018-09-07 ENCOUNTER — CHART COPY (OUTPATIENT)
Age: 78
End: 2018-09-07

## 2018-09-08 LAB
CULTURE RESULTS: SIGNIFICANT CHANGE UP
SPECIMEN SOURCE: SIGNIFICANT CHANGE UP

## 2018-09-20 ENCOUNTER — APPOINTMENT (OUTPATIENT)
Dept: ELECTROPHYSIOLOGY | Facility: CLINIC | Age: 78
End: 2018-09-20

## 2018-09-20 ENCOUNTER — CHART COPY (OUTPATIENT)
Age: 78
End: 2018-09-20

## 2018-09-20 DIAGNOSIS — G60.9 HEREDITARY AND IDIOPATHIC NEUROPATHY, UNSPECIFIED: ICD-10-CM

## 2018-09-20 DIAGNOSIS — Z87.09 PERSONAL HISTORY OF OTHER DISEASES OF THE RESPIRATORY SYSTEM: ICD-10-CM

## 2018-09-20 DIAGNOSIS — Z87.39 PERSONAL HISTORY OF OTHER DISEASES OF THE MUSCULOSKELETAL SYSTEM AND CONNECTIVE TISSUE: ICD-10-CM

## 2018-09-25 ENCOUNTER — INPATIENT (INPATIENT)
Facility: HOSPITAL | Age: 78
LOS: 9 days | Discharge: HOME | DRG: 862 | End: 2018-10-05
Attending: INTERNAL MEDICINE | Admitting: INTERNAL MEDICINE
Payer: MEDICARE

## 2018-09-25 VITALS
DIASTOLIC BLOOD PRESSURE: 44 MMHG | HEART RATE: 96 BPM | OXYGEN SATURATION: 100 % | RESPIRATION RATE: 17 BRPM | SYSTOLIC BLOOD PRESSURE: 67 MMHG | WEIGHT: 218.26 LBS

## 2018-09-25 DIAGNOSIS — Z98.890 OTHER SPECIFIED POSTPROCEDURAL STATES: Chronic | ICD-10-CM

## 2018-09-25 DIAGNOSIS — N39.0 URINARY TRACT INFECTION, SITE NOT SPECIFIED: ICD-10-CM

## 2018-09-25 DIAGNOSIS — Z95.0 PRESENCE OF CARDIAC PACEMAKER: Chronic | ICD-10-CM

## 2018-09-25 DIAGNOSIS — Z96.659 PRESENCE OF UNSPECIFIED ARTIFICIAL KNEE JOINT: Chronic | ICD-10-CM

## 2018-09-25 DIAGNOSIS — Z95.2 PRESENCE OF PROSTHETIC HEART VALVE: Chronic | ICD-10-CM

## 2018-09-25 LAB
ALBUMIN SERPL ELPH-MCNC: 2.6 G/DL — LOW (ref 3.3–5)
ALP SERPL-CCNC: 54 U/L — SIGNIFICANT CHANGE UP (ref 40–120)
ALT FLD-CCNC: 21 U/L — SIGNIFICANT CHANGE UP (ref 10–45)
ANION GAP SERPL CALC-SCNC: 11 MMOL/L — SIGNIFICANT CHANGE UP (ref 5–17)
APPEARANCE UR: ABNORMAL
APTT BLD: 30.5 SEC — SIGNIFICANT CHANGE UP (ref 27.5–37.4)
AST SERPL-CCNC: 15 U/L — SIGNIFICANT CHANGE UP (ref 10–40)
BASOPHILS # BLD AUTO: 0.1 K/UL — SIGNIFICANT CHANGE UP (ref 0–0.2)
BASOPHILS NFR BLD AUTO: 0 % — SIGNIFICANT CHANGE UP (ref 0–2)
BILIRUB SERPL-MCNC: 0.1 MG/DL — LOW (ref 0.2–1.2)
BILIRUB UR-MCNC: NEGATIVE — SIGNIFICANT CHANGE UP
BLD GP AB SCN SERPL QL: POSITIVE — SIGNIFICANT CHANGE UP
BUN SERPL-MCNC: 50 MG/DL — HIGH (ref 7–23)
CALCIUM SERPL-MCNC: 8.3 MG/DL — LOW (ref 8.4–10.5)
CHLORIDE SERPL-SCNC: 104 MMOL/L — SIGNIFICANT CHANGE UP (ref 96–108)
CO2 SERPL-SCNC: 24 MMOL/L — SIGNIFICANT CHANGE UP (ref 22–31)
COLOR SPEC: ABNORMAL
CREAT SERPL-MCNC: 0.94 MG/DL — SIGNIFICANT CHANGE UP (ref 0.5–1.3)
DAT POLY-SP REAG RBC QL: NEGATIVE — SIGNIFICANT CHANGE UP
DIFF PNL FLD: ABNORMAL
EOSINOPHIL # BLD AUTO: 0.1 K/UL — SIGNIFICANT CHANGE UP (ref 0–0.5)
EOSINOPHIL NFR BLD AUTO: 0 % — SIGNIFICANT CHANGE UP (ref 0–6)
EPI CELLS # UR: 19 /HPF — HIGH
GAS PNL BLDV: SIGNIFICANT CHANGE UP
GLUCOSE SERPL-MCNC: 132 MG/DL — HIGH (ref 70–99)
GLUCOSE UR QL: NEGATIVE — SIGNIFICANT CHANGE UP
HCT VFR BLD CALC: 22.7 % — LOW (ref 34.5–45)
HGB BLD-MCNC: 7.3 G/DL — LOW (ref 11.5–15.5)
INR BLD: 1.56 RATIO — HIGH (ref 0.88–1.16)
KETONES UR-MCNC: NEGATIVE — SIGNIFICANT CHANGE UP
LEUKOCYTE ESTERASE UR-ACNC: ABNORMAL
LYMPHOCYTES # BLD AUTO: 2.4 K/UL — SIGNIFICANT CHANGE UP (ref 1–3.3)
LYMPHOCYTES # BLD AUTO: 21 % — SIGNIFICANT CHANGE UP (ref 13–44)
MCHC RBC-ENTMCNC: 27.5 PG — SIGNIFICANT CHANGE UP (ref 27–34)
MCHC RBC-ENTMCNC: 32 GM/DL — SIGNIFICANT CHANGE UP (ref 32–36)
MCV RBC AUTO: 85.9 FL — SIGNIFICANT CHANGE UP (ref 80–100)
MONOCYTES # BLD AUTO: 0.6 K/UL — SIGNIFICANT CHANGE UP (ref 0–0.9)
MONOCYTES NFR BLD AUTO: 2 % — SIGNIFICANT CHANGE UP (ref 2–14)
NEUTROPHILS # BLD AUTO: 12 K/UL — HIGH (ref 1.8–7.4)
NEUTROPHILS NFR BLD AUTO: 71 % — SIGNIFICANT CHANGE UP (ref 43–77)
NITRITE UR-MCNC: NEGATIVE — SIGNIFICANT CHANGE UP
OB PNL STL: NEGATIVE — SIGNIFICANT CHANGE UP
PH UR: 6 — SIGNIFICANT CHANGE UP (ref 5–8)
PLATELET # BLD AUTO: 188 K/UL — SIGNIFICANT CHANGE UP (ref 150–400)
POTASSIUM SERPL-MCNC: 5 MMOL/L — SIGNIFICANT CHANGE UP (ref 3.5–5.3)
POTASSIUM SERPL-SCNC: 5 MMOL/L — SIGNIFICANT CHANGE UP (ref 3.5–5.3)
PROT SERPL-MCNC: 4.7 G/DL — LOW (ref 6–8.3)
PROT UR-MCNC: ABNORMAL
PROTHROM AB SERPL-ACNC: 17 SEC — HIGH (ref 9.8–12.7)
RBC # BLD: 2.64 M/UL — LOW (ref 3.8–5.2)
RBC # FLD: 18.2 % — HIGH (ref 10.3–14.5)
RBC CASTS # UR COMP ASSIST: 785 /HPF — HIGH (ref 0–4)
RH IG SCN BLD-IMP: POSITIVE — SIGNIFICANT CHANGE UP
SODIUM SERPL-SCNC: 139 MMOL/L — SIGNIFICANT CHANGE UP (ref 135–145)
SP GR SPEC: 1.02 — SIGNIFICANT CHANGE UP
UROBILINOGEN FLD QL: ABNORMAL
WBC # BLD: 15.1 K/UL — HIGH (ref 3.8–10.5)
WBC # FLD AUTO: 15.1 K/UL — HIGH (ref 3.8–10.5)
WBC UR QL: 2991 /HPF — HIGH (ref 0–5)

## 2018-09-25 PROCEDURE — 71045 X-RAY EXAM CHEST 1 VIEW: CPT | Mod: 26

## 2018-09-25 PROCEDURE — 93010 ELECTROCARDIOGRAM REPORT: CPT

## 2018-09-25 PROCEDURE — 71275 CT ANGIOGRAPHY CHEST: CPT | Mod: 26

## 2018-09-25 PROCEDURE — 99285 EMERGENCY DEPT VISIT HI MDM: CPT | Mod: GC,25

## 2018-09-25 RX ORDER — TIOTROPIUM BROMIDE 18 UG/1
1 CAPSULE ORAL; RESPIRATORY (INHALATION) DAILY
Qty: 0 | Refills: 0 | Status: DISCONTINUED | OUTPATIENT
Start: 2018-09-25 | End: 2018-10-05

## 2018-09-25 RX ORDER — PANTOPRAZOLE SODIUM 20 MG/1
40 TABLET, DELAYED RELEASE ORAL
Qty: 0 | Refills: 0 | Status: DISCONTINUED | OUTPATIENT
Start: 2018-09-25 | End: 2018-10-05

## 2018-09-25 RX ORDER — SIMVASTATIN 20 MG/1
20 TABLET, FILM COATED ORAL AT BEDTIME
Qty: 0 | Refills: 0 | Status: DISCONTINUED | OUTPATIENT
Start: 2018-09-25 | End: 2018-10-05

## 2018-09-25 RX ORDER — BUDESONIDE AND FORMOTEROL FUMARATE DIHYDRATE 160; 4.5 UG/1; UG/1
2 AEROSOL RESPIRATORY (INHALATION)
Qty: 0 | Refills: 0 | Status: DISCONTINUED | OUTPATIENT
Start: 2018-09-25 | End: 2018-10-05

## 2018-09-25 RX ORDER — LANOLIN ALCOHOL/MO/W.PET/CERES
3 CREAM (GRAM) TOPICAL AT BEDTIME
Qty: 0 | Refills: 0 | Status: DISCONTINUED | OUTPATIENT
Start: 2018-09-25 | End: 2018-10-05

## 2018-09-25 RX ORDER — GABAPENTIN 400 MG/1
300 CAPSULE ORAL THREE TIMES A DAY
Qty: 0 | Refills: 0 | Status: DISCONTINUED | OUTPATIENT
Start: 2018-09-25 | End: 2018-10-05

## 2018-09-25 RX ORDER — SODIUM CHLORIDE 9 MG/ML
3000 INJECTION INTRAMUSCULAR; INTRAVENOUS; SUBCUTANEOUS ONCE
Qty: 0 | Refills: 0 | Status: COMPLETED | OUTPATIENT
Start: 2018-09-25 | End: 2018-09-25

## 2018-09-25 RX ORDER — ASPIRIN/CALCIUM CARB/MAGNESIUM 324 MG
81 TABLET ORAL DAILY
Qty: 0 | Refills: 0 | Status: DISCONTINUED | OUTPATIENT
Start: 2018-09-25 | End: 2018-10-05

## 2018-09-25 RX ORDER — METOPROLOL TARTRATE 50 MG
12.5 TABLET ORAL
Qty: 0 | Refills: 0 | Status: DISCONTINUED | OUTPATIENT
Start: 2018-09-25 | End: 2018-09-25

## 2018-09-25 RX ORDER — SODIUM HYPOCHLORITE 0.125 %
1 SOLUTION, NON-ORAL MISCELLANEOUS
Qty: 0 | Refills: 0 | Status: DISCONTINUED | OUTPATIENT
Start: 2018-09-25 | End: 2018-10-05

## 2018-09-25 RX ORDER — AZTREONAM 2 G
1000 VIAL (EA) INJECTION EVERY 6 HOURS
Qty: 0 | Refills: 0 | Status: DISCONTINUED | OUTPATIENT
Start: 2018-09-25 | End: 2018-09-26

## 2018-09-25 RX ORDER — POLYETHYLENE GLYCOL 3350 17 G/17G
17 POWDER, FOR SOLUTION ORAL
Qty: 0 | Refills: 0 | Status: DISCONTINUED | OUTPATIENT
Start: 2018-09-25 | End: 2018-10-05

## 2018-09-25 RX ORDER — LORATADINE 10 MG/1
10 TABLET ORAL DAILY
Qty: 0 | Refills: 0 | Status: DISCONTINUED | OUTPATIENT
Start: 2018-09-25 | End: 2018-10-05

## 2018-09-25 RX ORDER — SENNA PLUS 8.6 MG/1
2 TABLET ORAL AT BEDTIME
Qty: 0 | Refills: 0 | Status: DISCONTINUED | OUTPATIENT
Start: 2018-09-25 | End: 2018-10-05

## 2018-09-25 RX ORDER — AZTREONAM 2 G
1000 VIAL (EA) INJECTION ONCE
Qty: 0 | Refills: 0 | Status: COMPLETED | OUTPATIENT
Start: 2018-09-25 | End: 2018-09-25

## 2018-09-25 RX ORDER — TRAMADOL HYDROCHLORIDE 50 MG/1
25 TABLET ORAL THREE TIMES A DAY
Qty: 0 | Refills: 0 | Status: DISCONTINUED | OUTPATIENT
Start: 2018-09-25 | End: 2018-10-02

## 2018-09-25 RX ORDER — METOPROLOL TARTRATE 50 MG
12.5 TABLET ORAL
Qty: 0 | Refills: 0 | Status: DISCONTINUED | OUTPATIENT
Start: 2018-09-25 | End: 2018-10-05

## 2018-09-25 RX ORDER — ACETAMINOPHEN 500 MG
650 TABLET ORAL EVERY 6 HOURS
Qty: 0 | Refills: 0 | Status: DISCONTINUED | OUTPATIENT
Start: 2018-09-25 | End: 2018-10-05

## 2018-09-25 RX ORDER — WARFARIN SODIUM 2.5 MG/1
2 TABLET ORAL ONCE
Qty: 0 | Refills: 0 | Status: COMPLETED | OUTPATIENT
Start: 2018-09-25 | End: 2018-09-25

## 2018-09-25 RX ORDER — MINOCYCLINE HYDROCHLORIDE 45 MG/1
100 TABLET, EXTENDED RELEASE ORAL
Qty: 0 | Refills: 0 | Status: DISCONTINUED | OUTPATIENT
Start: 2018-09-25 | End: 2018-09-26

## 2018-09-25 RX ORDER — SIMETHICONE 80 MG/1
80 TABLET, CHEWABLE ORAL
Qty: 0 | Refills: 0 | Status: DISCONTINUED | OUTPATIENT
Start: 2018-09-25 | End: 2018-10-05

## 2018-09-25 RX ADMIN — GABAPENTIN 300 MILLIGRAM(S): 400 CAPSULE ORAL at 23:09

## 2018-09-25 RX ADMIN — Medication 50 MILLIGRAM(S): at 23:10

## 2018-09-25 RX ADMIN — POLYETHYLENE GLYCOL 3350 17 GRAM(S): 17 POWDER, FOR SOLUTION ORAL at 23:11

## 2018-09-25 RX ADMIN — SENNA PLUS 2 TABLET(S): 8.6 TABLET ORAL at 23:13

## 2018-09-25 RX ADMIN — Medication 1 TABLET(S): at 23:06

## 2018-09-25 RX ADMIN — LORATADINE 10 MILLIGRAM(S): 10 TABLET ORAL at 23:06

## 2018-09-25 RX ADMIN — SIMVASTATIN 20 MILLIGRAM(S): 20 TABLET, FILM COATED ORAL at 23:06

## 2018-09-25 RX ADMIN — Medication 3 MILLIGRAM(S): at 23:06

## 2018-09-25 RX ADMIN — Medication 50 MILLIGRAM(S): at 13:35

## 2018-09-25 RX ADMIN — WARFARIN SODIUM 2 MILLIGRAM(S): 2.5 TABLET ORAL at 23:06

## 2018-09-25 RX ADMIN — Medication 12.5 MILLIGRAM(S): at 23:06

## 2018-09-25 RX ADMIN — MINOCYCLINE HYDROCHLORIDE 100 MILLIGRAM(S): 45 TABLET, EXTENDED RELEASE ORAL at 23:11

## 2018-09-25 RX ADMIN — BUDESONIDE AND FORMOTEROL FUMARATE DIHYDRATE 2 PUFF(S): 160; 4.5 AEROSOL RESPIRATORY (INHALATION) at 23:05

## 2018-09-25 RX ADMIN — Medication 650 MILLIGRAM(S): at 23:06

## 2018-09-25 RX ADMIN — SODIUM CHLORIDE 3000 MILLILITER(S): 9 INJECTION INTRAMUSCULAR; INTRAVENOUS; SUBCUTANEOUS at 11:20

## 2018-09-25 NOTE — ED ADULT NURSE REASSESSMENT NOTE - NS ED NURSE REASSESS COMMENT FT1
Pt AAOx4, NAD, resting comfortably in bed. 1 unit PRBCs started as ordered by MD. Consent in chart. Risks and benefits explained to patient. Patient verbalized understanding of risks and benefits. Patient aware of possible side effects. Vital signs stable. Second RN at bedside for confirmation. Safety maintained.

## 2018-09-25 NOTE — H&P ADULT - ASSESSMENT
76yo female pmh HTN, CAD with PPM, recent admission for infected right knee hardware on minocycline by PICC at rehab center p/w multiple episodes of syncope, found to be hypotensive 60/40 by EMS, started on IVF by EMS, received 100cc prior to arrival, also hypoxic to low 90s on RA. Pt c/o dysuria and foul-smelling urine x 5 days, c/o dizziness. Denies fever, chills, chest pain, dyspnea, palpitations, dizziness, weakness, recent cough, nausea, vomiting, diarrhea, abdominal pain, BRBPR, melena, back pain, leg swelling. 76yo female pmh HTN, CAD with PPM, recent admission for infected right knee hardware on minocycline by PICC at rehab center p/w multiple episodes of syncope, found to be hypotensive 60/40 by EMS, started on IVF by EMS, received 100cc prior to arrival, also hypoxic to low 90s on RA. Pt c/o dysuria and foul-smelling urine x 5 days, c/o dizziness. Denies fever, chills, chest pain, dyspnea, palpitations, dizziness, weakness, recent cough, nausea, vomiting, diarrhea, abdominal pain, BRBPR, melena, back pain, leg swelling.      septic shock  - iv Aztreonam  - check urine and blood cultures  - ID consult    polymyalgia  - cw prednisone  - no need for stress dose steroids at this time    CAD  - c/w asa    lumbar disc disease  - cw Neurontin    copd  - cw Advair and Spiriva    GERD  - cw protonix    dvt px  - on coumadin

## 2018-09-25 NOTE — ED PROVIDER NOTE - SKIN COLOR
multiple pressure ulcers of bilateral medial thigh, right buttock, all appears clean and dry without surrounding erythema or discharge. Right buttock ecchymosis. Right lower extremity surgical site appears clean, no surrounding erythema. Mild oozing of dark blood from center of surgical site./PALE

## 2018-09-25 NOTE — ED PROVIDER NOTE - MEDICAL DECISION MAKING DETAILS
76yo female pmh HTN CAD with PPM, recent hardware infection with dysuria, hypotension improved to 90/60 with IVF. No history of heart failure, will continue IVF. Afebrile. On minocycline for MRSA coverage, adding aztreonam for possible urosepsis.

## 2018-09-25 NOTE — ED PROVIDER NOTE - ATTENDING CONTRIBUTION TO CARE
attending Octavio: 77yF h/o HTN, CAD with PPM, recent admission for infected R knee hardware on minocycline via PICC at rehab BIBEMS for hypotension and generalized weakness. Also reportedly hypoxic and complaining of dysuria x several days. On arrival, A+Ox3, hypotensive 60/40s, clear lungs. Will obtain labs including vbg with lactate and blood cultures, UA/Ucx, lund, cxr, empiric broad spectrum abx, IVF and admit

## 2018-09-25 NOTE — ED PROVIDER NOTE - PMH
Anxiety    Aortic stenosis, severe    CAD (coronary artery disease)  HERBETR to LAD 11/2016  Dysphagia    GERD (gastroesophageal reflux disease)    Hiatal hernia    Lumbar disc disease with radiculopathy    Macular degeneration    Osteoarthritis    Polymyalgia    Sciatica    Severe aortic stenosis by prior echocardiogram  2013 and  11/2016  Spider veins    Uncomplicated asthma, unspecified asthma severity

## 2018-09-25 NOTE — ED PROVIDER NOTE - OBJECTIVE STATEMENT
78yo female pmh HTN, CAD with PPM, recent admission for infected right knee hardware on minocycline by PICC at rehab center p/w multiple episodes of syncope, found to be hypotensive 60/40 by EMS, started on IVF by EMS, received 100cc prior to arrival, also hypoxic to low 90s on RA. Pt c/o dysuria and foul-smelling urine x 5 days, c/o dizziness. Denies fever, chills, chest pain, dyspnea, palpitations, dizziness, weakness, recent cough, nausea, vomiting, diarrhea, abdominal pain, BRBPR, melena, back pain, leg swelling.    Significant past medical hx/surgical hx/social hx and review of systems can be found above in the history of present illness.

## 2018-09-25 NOTE — ED ADULT NURSE REASSESSMENT NOTE - NS ED NURSE REASSESS COMMENT FT1
Navarrete catheter inserted using sterile technique. Second RN present to confirm sterility. Bedside drainage to gravity. Stat lock in place. Pt made little urine output initially.

## 2018-09-25 NOTE — ED ADULT NURSE NOTE - OBJECTIVE STATEMENT
Pt is a 78 yo female coming in from [facility] sp multiple episodes of syncope and was hypotension 60/40 as per EMS. Pt is at [facility] for a recent right knee infection, and has a PICC line in the right a/c. Pt denies any N/V/D no CP no cough fever or chills. + SOB this am and dysuria with foul smelling urine for the past 4 days. Pt has a pmh of HTN, CAD with multiple open wounds, wounds are dry and intact.

## 2018-09-25 NOTE — ED ADULT NURSE NOTE - NSIMPLEMENTINTERV_GEN_ALL_ED
Implemented All Fall with Harm Risk Interventions:  [place] to call system. Call bell, personal items and telephone within reach. Instruct patient to call for assistance. Room bathroom lighting operational. Non-slip footwear when patient is off stretcher. Physically safe environment: no spills, clutter or unnecessary equipment. Stretcher in lowest position, wheels locked, appropriate side rails in place. Provide visual cue, wrist band, yellow gown, etc. Monitor gait and stability. Monitor for mental status changes and reorient to person, place, and time. Review medications for side effects contributing to fall risk. Reinforce activity limits and safety measures with patient and family. Provide visual clues: red socks.

## 2018-09-25 NOTE — H&P ADULT - HISTORY OF PRESENT ILLNESS
76yo female pmh HTN, CAD with PPM, recent admission for infected right knee hardware on minocycline by PICC at rehab center p/w multiple episodes of syncope, found to be hypotensive 60/40 by EMS, started on IVF by EMS, received 100cc prior to arrival, also hypoxic to low 90s on RA. Pt c/o dysuria and foul-smelling urine x 5 days, c/o dizziness. Denies fever, chills, chest pain, dyspnea, palpitations, dizziness, weakness, recent cough, nausea, vomiting, diarrhea, abdominal pain, BRBPR, melena, back pain, leg swelling.

## 2018-09-25 NOTE — H&P ADULT - NSHPLABSRESULTS_GEN_ALL_CORE
LABS:                        7.3    15.1  )-----------( 188      ( 25 Sep 2018 11:50 )             22.7         139  |  104  |  50<H>  ----------------------------<  132<H>  5.0   |  24  |  0.94    Ca    8.3<L>      25 Sep 2018 11:50    TPro  4.7<L>  /  Alb  2.6<L>  /  TBili  0.1<L>  /  DBili  x   /  AST  15  /  ALT  21  /  AlkPhos  54      PT/INR - ( 25 Sep 2018 11:50 )   PT: 17.0 sec;   INR: 1.56 ratio         PTT - ( 25 Sep 2018 11:50 )  PTT:30.5 sec  Urinalysis Basic - ( 25 Sep 2018 10:30 )    Color: Orange / Appearance: Turbid / S.022 / pH: x  Gluc: x / Ketone: Negative  / Bili: Negative / Urobili: 2 mg/dL   Blood: x / Protein: 300 mg/dL / Nitrite: Negative   Leuk Esterase: Large / RBC: 785 /hpf / WBC 2991 /hpf   Sq Epi: x / Non Sq Epi: 19 /hpf / Bacteria: x            RADIOLOGY & ADDITIONAL TESTS:

## 2018-09-25 NOTE — ED ADULT NURSE REASSESSMENT NOTE - NS ED NURSE REASSESS COMMENT FT1
Spoke with Rosa Elena in blood bank and pt has antibodies and needs to cross matched, "call back at 4." MD Cunningham aware

## 2018-09-26 LAB
-  COAGULASE NEGATIVE STAPHYLOCOCCUS: SIGNIFICANT CHANGE UP
ANION GAP SERPL CALC-SCNC: 8 MMOL/L — SIGNIFICANT CHANGE UP (ref 5–17)
APTT BLD: 28.2 SEC — SIGNIFICANT CHANGE UP (ref 27.5–37.4)
BLD GP AB SCN SERPL QL: POSITIVE — SIGNIFICANT CHANGE UP
BUN SERPL-MCNC: 52 MG/DL — HIGH (ref 7–23)
CALCIUM SERPL-MCNC: 8.3 MG/DL — LOW (ref 8.4–10.5)
CHLORIDE SERPL-SCNC: 105 MMOL/L — SIGNIFICANT CHANGE UP (ref 96–108)
CO2 SERPL-SCNC: 24 MMOL/L — SIGNIFICANT CHANGE UP (ref 22–31)
CREAT SERPL-MCNC: 1.02 MG/DL — SIGNIFICANT CHANGE UP (ref 0.5–1.3)
CULTURE RESULTS: NO GROWTH — SIGNIFICANT CHANGE UP
FERRITIN SERPL-MCNC: 41 NG/ML — SIGNIFICANT CHANGE UP (ref 15–150)
FOLATE SERPL-MCNC: >20 NG/ML — SIGNIFICANT CHANGE UP
GLUCOSE SERPL-MCNC: 162 MG/DL — HIGH (ref 70–99)
GRAM STN FLD: SIGNIFICANT CHANGE UP
HCT VFR BLD CALC: 20.5 % — CRITICAL LOW (ref 34.5–45)
HGB BLD-MCNC: 6.3 G/DL — CRITICAL LOW (ref 11.5–15.5)
INR BLD: 1.55 RATIO — HIGH (ref 0.88–1.16)
IRON SATN MFR SERPL: 12 % — LOW (ref 14–50)
IRON SATN MFR SERPL: 24 UG/DL — LOW (ref 30–160)
MAGNESIUM SERPL-MCNC: 1.7 MG/DL — SIGNIFICANT CHANGE UP (ref 1.6–2.6)
MCHC RBC-ENTMCNC: 26.6 PG — LOW (ref 27–34)
MCHC RBC-ENTMCNC: 30.7 GM/DL — LOW (ref 32–36)
MCV RBC AUTO: 86.5 FL — SIGNIFICANT CHANGE UP (ref 80–100)
METHOD TYPE: SIGNIFICANT CHANGE UP
NRBC # BLD: 2 /100 WBCS — HIGH (ref 0–0)
PHOSPHATE SERPL-MCNC: 4.7 MG/DL — HIGH (ref 2.5–4.5)
PLATELET # BLD AUTO: 132 K/UL — LOW (ref 150–400)
POTASSIUM SERPL-MCNC: 5.2 MMOL/L — SIGNIFICANT CHANGE UP (ref 3.5–5.3)
POTASSIUM SERPL-SCNC: 5.2 MMOL/L — SIGNIFICANT CHANGE UP (ref 3.5–5.3)
PROTHROM AB SERPL-ACNC: 17.7 SEC — HIGH (ref 10–13.1)
RBC # BLD: 2.37 M/UL — LOW (ref 3.8–5.2)
RBC # FLD: 20.4 % — HIGH (ref 10.3–14.5)
RH IG SCN BLD-IMP: POSITIVE — SIGNIFICANT CHANGE UP
SODIUM SERPL-SCNC: 137 MMOL/L — SIGNIFICANT CHANGE UP (ref 135–145)
SPECIMEN SOURCE: SIGNIFICANT CHANGE UP
TIBC SERPL-MCNC: 198 UG/DL — LOW (ref 220–430)
TSH SERPL-MCNC: 1.04 UIU/ML — SIGNIFICANT CHANGE UP (ref 0.27–4.2)
UIBC SERPL-MCNC: 174 UG/DL — SIGNIFICANT CHANGE UP (ref 110–370)
VIT B12 SERPL-MCNC: 321 PG/ML — SIGNIFICANT CHANGE UP (ref 232–1245)
WBC # BLD: 12.7 K/UL — HIGH (ref 3.8–10.5)
WBC # FLD AUTO: 12.7 K/UL — HIGH (ref 3.8–10.5)

## 2018-09-26 PROCEDURE — 73560 X-RAY EXAM OF KNEE 1 OR 2: CPT | Mod: 26,RT

## 2018-09-26 PROCEDURE — 99232 SBSQ HOSP IP/OBS MODERATE 35: CPT

## 2018-09-26 RX ORDER — VANCOMYCIN HCL 1 G
VIAL (EA) INTRAVENOUS
Qty: 0 | Refills: 0 | Status: DISCONTINUED | OUTPATIENT
Start: 2018-09-26 | End: 2018-10-04

## 2018-09-26 RX ORDER — VANCOMYCIN HCL 1 G
1000 VIAL (EA) INTRAVENOUS ONCE
Qty: 0 | Refills: 0 | Status: COMPLETED | OUTPATIENT
Start: 2018-09-26 | End: 2018-09-26

## 2018-09-26 RX ORDER — HYDROMORPHONE HYDROCHLORIDE 2 MG/ML
1 INJECTION INTRAMUSCULAR; INTRAVENOUS; SUBCUTANEOUS ONCE
Qty: 0 | Refills: 0 | Status: DISCONTINUED | OUTPATIENT
Start: 2018-09-26 | End: 2018-09-26

## 2018-09-26 RX ORDER — VANCOMYCIN HCL 1 G
1000 VIAL (EA) INTRAVENOUS EVERY 24 HOURS
Qty: 0 | Refills: 0 | Status: DISCONTINUED | OUTPATIENT
Start: 2018-09-27 | End: 2018-10-04

## 2018-09-26 RX ORDER — WARFARIN SODIUM 2.5 MG/1
4 TABLET ORAL ONCE
Qty: 0 | Refills: 0 | Status: COMPLETED | OUTPATIENT
Start: 2018-09-26 | End: 2018-09-26

## 2018-09-26 RX ADMIN — HYDROMORPHONE HYDROCHLORIDE 1 MILLIGRAM(S): 2 INJECTION INTRAMUSCULAR; INTRAVENOUS; SUBCUTANEOUS at 04:34

## 2018-09-26 RX ADMIN — BUDESONIDE AND FORMOTEROL FUMARATE DIHYDRATE 2 PUFF(S): 160; 4.5 AEROSOL RESPIRATORY (INHALATION) at 05:44

## 2018-09-26 RX ADMIN — Medication 1 TABLET(S): at 05:46

## 2018-09-26 RX ADMIN — PANTOPRAZOLE SODIUM 40 MILLIGRAM(S): 20 TABLET, DELAYED RELEASE ORAL at 05:45

## 2018-09-26 RX ADMIN — Medication 50 MILLIGRAM(S): at 15:44

## 2018-09-26 RX ADMIN — Medication 50 MILLIGRAM(S): at 05:48

## 2018-09-26 RX ADMIN — GABAPENTIN 300 MILLIGRAM(S): 400 CAPSULE ORAL at 21:53

## 2018-09-26 RX ADMIN — LORATADINE 10 MILLIGRAM(S): 10 TABLET ORAL at 15:44

## 2018-09-26 RX ADMIN — Medication 50 MILLIGRAM(S): at 05:44

## 2018-09-26 RX ADMIN — Medication 650 MILLIGRAM(S): at 22:36

## 2018-09-26 RX ADMIN — BUDESONIDE AND FORMOTEROL FUMARATE DIHYDRATE 2 PUFF(S): 160; 4.5 AEROSOL RESPIRATORY (INHALATION) at 17:57

## 2018-09-26 RX ADMIN — TIOTROPIUM BROMIDE 1 CAPSULE(S): 18 CAPSULE ORAL; RESPIRATORY (INHALATION) at 05:45

## 2018-09-26 RX ADMIN — Medication 1 TABLET(S): at 21:52

## 2018-09-26 RX ADMIN — SENNA PLUS 2 TABLET(S): 8.6 TABLET ORAL at 21:53

## 2018-09-26 RX ADMIN — TRAMADOL HYDROCHLORIDE 25 MILLIGRAM(S): 50 TABLET ORAL at 11:42

## 2018-09-26 RX ADMIN — TRAMADOL HYDROCHLORIDE 25 MILLIGRAM(S): 50 TABLET ORAL at 12:30

## 2018-09-26 RX ADMIN — Medication 650 MILLIGRAM(S): at 05:46

## 2018-09-26 RX ADMIN — Medication 250 MILLIGRAM(S): at 17:56

## 2018-09-26 RX ADMIN — Medication 1 APPLICATION(S): at 05:47

## 2018-09-26 RX ADMIN — GABAPENTIN 300 MILLIGRAM(S): 400 CAPSULE ORAL at 05:46

## 2018-09-26 RX ADMIN — WARFARIN SODIUM 4 MILLIGRAM(S): 2.5 TABLET ORAL at 21:50

## 2018-09-26 RX ADMIN — GABAPENTIN 300 MILLIGRAM(S): 400 CAPSULE ORAL at 15:45

## 2018-09-26 RX ADMIN — Medication 1 APPLICATION(S): at 17:57

## 2018-09-26 RX ADMIN — TRAMADOL HYDROCHLORIDE 25 MILLIGRAM(S): 50 TABLET ORAL at 22:37

## 2018-09-26 RX ADMIN — TRAMADOL HYDROCHLORIDE 25 MILLIGRAM(S): 50 TABLET ORAL at 21:50

## 2018-09-26 RX ADMIN — Medication 12.5 MILLIGRAM(S): at 05:46

## 2018-09-26 RX ADMIN — Medication 12.5 MILLIGRAM(S): at 17:56

## 2018-09-26 RX ADMIN — MINOCYCLINE HYDROCHLORIDE 100 MILLIGRAM(S): 45 TABLET, EXTENDED RELEASE ORAL at 05:45

## 2018-09-26 RX ADMIN — Medication 3 MILLIGRAM(S): at 21:50

## 2018-09-26 RX ADMIN — Medication 1 TABLET(S): at 15:44

## 2018-09-26 RX ADMIN — Medication 81 MILLIGRAM(S): at 15:44

## 2018-09-26 RX ADMIN — Medication 650 MILLIGRAM(S): at 21:51

## 2018-09-26 RX ADMIN — SIMVASTATIN 20 MILLIGRAM(S): 20 TABLET, FILM COATED ORAL at 21:51

## 2018-09-26 RX ADMIN — Medication 650 MILLIGRAM(S): at 15:43

## 2018-09-26 NOTE — CONSULT NOTE ADULT - SUBJECTIVE AND OBJECTIVE BOX
HPI:   Patient is a 77y female with history of right tkr infection initially with strept sp s/p rudy and spacer then poor wound healing and had mrsa infection s/p change of spacer with prolonged wound healing. She is on suppressive minocycline since this summer. She has a neutrophilic dermatosis improved with steroids. She had a large hematoma of the right thigh with skin necrosis that is healing well. She has been at rehab or in the hospital for nearly a year. She now returns with report of sob,hypotension with  presyncope . She denies dysuria at present.  We are called. Several months ago she had appendicitis treated with antibiotics alone and has no further pain. She now feels fine except as usual her right leg hurts. She has a right arm picc in place for several months. She has a tavr.     REVIEW OF SYSTEMS:  All other review of systems negative (Comprehensive ROS)    PAST MEDICAL & SURGICAL HISTORY:  CAD (coronary artery disease): HERBERT to LAD 2016  Aortic stenosis, severe  Uncomplicated asthma, unspecified asthma severity  Polymyalgia  Lumbar disc disease with radiculopathy  Spider veins  Anxiety  Severe aortic stenosis by prior echocardiogram:  and  2016  Dysphagia  Macular degeneration  Hiatal hernia  GERD (gastroesophageal reflux disease)  Sciatica  Osteoarthritis  S/P TKR (total knee replacement): joint infection s/p explant and abx spacer on 17  S/P TAVR (transcatheter aortic valve replacement): 17  Artificial cardiac pacemaker: 17  H/O cardiac catheterization: 16 HERBERT placed on LAD  H/O endoscopy: with dilatation of esophageal stricture   S/P cataract surgery: x2; 3-4yr ago  S/P gastroplasty: 28 yrs ago  S/P cholecystectomy: more than 15 years ago  S/P total knee replacement: left, 15yr ago  S/P total knee replacement: right, 12yr ago  S/P hysterectomy: 24yr ago      Allergies    amoxicillin (Other)    Intolerances    IV Contrast (Flushing (Skin); Nausea)      Antimicrobials Day #  :  aztreonam  IVPB 1000 milliGRAM(s) IV Intermittent every 6 hours  minocycline 100 milliGRAM(s) Oral two times a day  vancomycin  IVPB        Other Medications:  acetaminophen   Tablet .. 650 milliGRAM(s) Oral every 6 hours  aspirin enteric coated 81 milliGRAM(s) Oral daily  buDESOnide 160 MICROgram(s)/formoterol 4.5 MICROgram(s) Inhaler 2 Puff(s) Inhalation two times a day  calcium carbonate 1250 mG  + Vitamin D (OsCal 500 + D) 1 Tablet(s) Oral three times a day  Dakins Solution - 1/4 Strength 1 Application(s) Topical two times a day  gabapentin 300 milliGRAM(s) Oral three times a day  loratadine 10 milliGRAM(s) Oral daily  melatonin 3 milliGRAM(s) Oral at bedtime  metoprolol tartrate 12.5 milliGRAM(s) Oral two times a day  pantoprazole    Tablet 40 milliGRAM(s) Oral before breakfast  polyethylene glycol 3350 17 Gram(s) Oral two times a day  predniSONE   Tablet 50 milliGRAM(s) Oral daily  senna 2 Tablet(s) Oral at bedtime PRN  simethicone 80 milliGRAM(s) Chew two times a day PRN  simvastatin 20 milliGRAM(s) Oral at bedtime  tiotropium 18 MICROgram(s) Capsule 1 Capsule(s) Inhalation daily  traMADol 25 milliGRAM(s) Oral three times a day PRN      FAMILY HISTORY:  No pertinent family history in first degree relatives      SOCIAL HISTORY:  Smoking: [ ]Yes [ x]No  ETOH: [ ]Yes [ x]No  Drug Use: [ ]Yes [ x]No   [ x] Single[ ]    T(F): 97.8 (18 @ 16:32), Max: 98.5 (18 @ 10:42)  HR: 75 (18 @ 16:32)  BP: 111/69 (18 @ 16:32)  RR: 18 (18 @ 16:32)  SpO2: 93% (18 @ 16:32)  Wt(kg): --    PHYSICAL EXAM:  General: alert, no acute distress  Eyes:  anicteric, no conjunctival injection, no discharge  Oropharynx: no lesions or injection 	  Neck: supple, without adenopathy  Lungs: clear to auscultation  Heart: regular rate and rhythm. s1s2 2/6 sys m  Abdomen: soft, nondistended, nontender, without mass or organomegaly. clean wounds  Skin: healling ulcers on thighs and abdomen  Extremities: right knee wound mostly healed.   Neurologic: alert, oriented, moves all extremities    LAB RESULTS:                        6.3    12.70 )-----------( 132      ( 26 Sep 2018 12:18 )             20.5         137  |  105  |  52<H>  ----------------------------<  162<H>  5.2   |  24  |  1.02    Ca    8.3<L>      26 Sep 2018 09:25  Phos  4.7       Mg     1.7         TPro  4.7<L>  /  Alb  2.6<L>  /  TBili  0.1<L>  /  DBili  x   /  AST  15  /  ALT  21  /  AlkPhos  54      LIVER FUNCTIONS - ( 25 Sep 2018 11:50 )  Alb: 2.6 g/dL / Pro: 4.7 g/dL / ALK PHOS: 54 U/L / ALT: 21 U/L / AST: 15 U/L / GGT: x           Urinalysis Basic - ( 25 Sep 2018 10:30 )    Color: Orange / Appearance: Turbid / S.022 / pH: x  Gluc: x / Ketone: Negative  / Bili: Negative / Urobili: 2 mg/dL   Blood: x / Protein: 300 mg/dL / Nitrite: Negative   Leuk Esterase: Large / RBC: 785 /hpf / WBC 2991 /hpf   Sq Epi: x / Non Sq Epi: 19 /hpf / Bacteria: x        MICROBIOLOGY:  RECENT CULTURES:   @ 14:20 .Blood Blood-Peripheral Blood Culture PCR    Growth in anaerobic bottle: Gram Positive Cocci in Clusters  Growth in aerobic bottle: Gram Positive Cocci in Clusters    Growth in anaerobic bottle: Gram Positive Cocci in Clusters  Growth in aerobic bottle: Gram Positive Cocci in Clusters     @ 14:07 .Urine Clean Catch (Midstream)     No growth            RADIOLOGY REVIEWED:    < from: US TTE 2D F/U, Limited w/o Contrast (ED) (18 @ 16:41) >  IMPRESSION:   No significant Pericardial Effusion.        < end of copied text >  < from: CT Angio Chest w/ IV Cont (18 @ 13:21) >    IMPRESSION:     No pulmonary embolus.    New small-moderate pleural effusions with adjacent compressive   atelectasis. Underlying infection cannot be ruled out. Recommend clinical   correlation for pneumonia.    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 She grew 4/4 coag neg staph from blood cultures which may be via picc but I actually do believe she has a picc associated blood stream infection. She is a very hard stick so needs removal of this picc but may need some other central access before bacteremia is documented as cleared.     Recommendations: Repeat blood cultures to be sure not contaminant then start iv vancomycin 1 g daily  stop minocycline and aztreonam since urine culture is negative  remove picc once new iv access has been secured  if follow up blood cultures grow will need to consider zoe since has tavr  continue local wound care per dr james frazier follows for knee

## 2018-09-26 NOTE — CONSULT NOTE ADULT - SUBJECTIVE AND OBJECTIVE BOX
Wound SURGERY CONSULT NOTE    HPI:  78yo female pmh HTN, CAD with PPM, recent admission for infected right knee hardware on minocycline by PICC at rehab center p/w multiple episodes of syncope, found to be hypotensive 60/40 by EMS, started on IVF by EMS, received 100cc prior to arrival, also hypoxic to low 90s on RA. Pt c/o dysuria and foul-smelling urine x 5 days, c/o dizziness. Denies fever, chills, chest pain, dyspnea, palpitations, dizziness, weakness, recent cough, nausea, vomiting, diarrhea, abdominal pain, BRBPR, melena, back pain, leg swelling.   Wound consult requested to assist w/ management of mulitple wound/ pressure ulcer.  wounds much improved.  continues on steroids. VAC was d/c'ed.  Aquacel & Dakins packing being used on wounds depending on its progress. No drainage, odor, redness, warmth, pain, f/c/s noted. Drainage managed by dressing.  Pt still sedentary most of day.  Moving more- working on increasing strength, can stand up a bit, but not really walking much.  HHA at bedside.  Pt's  was called and spoken to.  All questions asked and answered to pt & family's satisfaction.   Pt well known to team from previous admissions.      PAST MEDICAL & SURGICAL HISTORY:  CAD (coronary artery disease): HERBERT to LAD 2016  Aortic stenosis, severe  Uncomplicated asthma, unspecified asthma severity  Polymyalgia  Lumbar disc disease with radiculopathy  Spider veins  Anxiety  Severe aortic stenosis by prior echocardiogram:  and  2016  Dysphagia  Macular degeneration  Hiatal hernia  GERD (gastroesophageal reflux disease)  Sciatica  Osteoarthritis  S/P TKR (total knee replacement): joint infection s/p explant and abx spacer on 17  S/P TAVR (transcatheter aortic valve replacement): 17  Artificial cardiac pacemaker: 17  s/p cardiac catheterization: 16 HERBERT placed on LAD  s/p endoscopy: with dilatation of esophageal stricture   S/P cataract surgery: x2; 3-4yr ago  S/P gastroplasty: 28 yrs ago  S/P cholecystectomy: more than 15 years ago  S/P total knee replacement: left, 15yr ago  S/P total knee replacement: right, 12yr ago  S/P hysterectomy: 24yr ago      REVIEW OF SYSTEMS    skin/ MSK: see HPI  All other systems negative    MEDICATIONS  (STANDING):  acetaminophen   Tablet .. 650 milliGRAM(s) Oral every 6 hours  aspirin enteric coated 81 milliGRAM(s) Oral daily  aztreonam  IVPB 1000 milliGRAM(s) IV Intermittent every 6 hours  buDESOnide 160 MICROgram(s)/formoterol 4.5 MICROgram(s) Inhaler 2 Puff(s) Inhalation two times a day  calcium carbonate 1250 mG  + Vitamin D (OsCal 500 + D) 1 Tablet(s) Oral three times a day  Dakins Solution - 1/4 Strength 1 Application(s) Topical two times a day  gabapentin 300 milliGRAM(s) Oral three times a day  loratadine 10 milliGRAM(s) Oral daily  melatonin 3 milliGRAM(s) Oral at bedtime  metoprolol tartrate 12.5 milliGRAM(s) Oral two times a day  minocycline 100 milliGRAM(s) Oral two times a day  pantoprazole    Tablet 40 milliGRAM(s) Oral before breakfast  polyethylene glycol 3350 17 Gram(s) Oral two times a day  predniSONE   Tablet 50 milliGRAM(s) Oral daily  simvastatin 20 milliGRAM(s) Oral at bedtime  tiotropium 18 MICROgram(s) Capsule 1 Capsule(s) Inhalation daily  vancomycin  IVPB        MEDICATIONS  (PRN):  senna 2 Tablet(s) Oral at bedtime PRN Constipation  simethicone 80 milliGRAM(s) Chew two times a day PRN Gas  traMADol 25 milliGRAM(s) Oral three times a day PRN Moderate Pain (4 - 6)      Allergies    amoxicillin (Other)    Intolerances    IV Contrast (Flushing (Skin); Nausea)      SOCIAL HISTORY:  ; (+)HHA/ has been in SNF; Denies smoking, ETOH, drugs    FAMILY HISTORY:  No pertinent family history in first degree relatives      Vital Signs Last 24 Hrs  T(C): 36.6 (26 Sep 2018 16:32), Max: 36.9 (26 Sep 2018 10:42)  T(F): 97.8 (26 Sep 2018 16:32), Max: 98.5 (26 Sep 2018 10:42)  HR: 75 (26 Sep 2018 16:32) (67 - 89)  BP: 111/69 (26 Sep 2018 16:32) (101/52 - 122/72)  BP(mean): --  RR: 18 (26 Sep 2018 16:32) (18 - 18)  SpO2: 93% (26 Sep 2018 16:32) (92% - 100%)    NAD / A&Ox3/   MO/ frail- appears more mobile, bt still mostly sedentary  WD/ WN/ WG  Versa Care P500 bed    Cardiovascular: RRR     Respiratory: CTA    Gastrointestinal soft NT/ND (+)BS      Neurology  weakened strength & sensation grossly intact    Musculoskeletal/Vascular: FROM x4  BLE edema equal  BLE equally warm  no acute ischemia noted  mild hemosiderin staining  no deformities/ contractures    Skin:  frail,  ecchymosis w/o hematoma      pt is tender when just gently touching skin- therefore exam was limited & measurements inputted by RN seem appropriate    Multiple wounds- see measurements in A&I sections- placed by wound RN    RLQ abdomen wound w/ moist & granular tissue no odor, kimber- nearly healed- linear 4cm x 0.3cm x 0.1cm      Rt anterior thigh wound w/ moist & granular tissue - kimber- nearly healed- irregular shaped linear partial thickness-disconnected     Rt Thigh superior posterolateral w/ new upper thigh 2 small wounds opened w/ serosanguinous drainage w/ increased granular tissue w/ scant fibrinous exudate & slough    Rt Thigh inferior posterolateral wound   increased moist & granular tissue   (+) serosanguinous drainage & tender w/o odor  No erythema, increased warmth, induration, fluctuance    Lt upper lateral thigh wound  full thickness ulcers w/ moist & granular tissue   (+)serosanguinous  drainage  (+)tender w/o malodor  No erythema, increased warmth, induration, fluctuance      Lt  Upper medial inner thigh   2 wounds/ now several pinpoint areas w/  2 partial thickness -   moist &granular   No drainage  (+)tender w/o odor  No erythema, increased warmth, induration, fluctuance      LABS:      137  |  105  |  52<H>  ----------------------------<  162<H>  5.2   |  24  |  1.02    Ca    8.3<L>      26 Sep 2018 09:25  Phos  4.7       Mg     1.7         TPro  4.7<L>  /  Alb  2.6<L>  /  TBili  0.1<L>  /  DBili  x   /  AST  15  /  ALT  21  /  AlkPhos  54  09-25                          6.3    12.70 )-----------( 132      ( 26 Sep 2018 12:18 )             20.5     PT/INR - ( 26 Sep 2018 12:18 )   PT: 17.7 sec;   INR: 1.55 ratio    PTT - ( 26 Sep 2018 12:18 )  PTT:28.2 sec    Urinalysis Basic - ( 25 Sep 2018 10:30 )    Color: Orange / Appearance: Turbid / S.022 / pH: x  Gluc: x / Ketone: Negative  / Bili: Negative / Urobili: 2 mg/dL   Blood: x / Protein: 300 mg/dL / Nitrite: Negative   Leuk Esterase: Large / RBC: 785 /hpf / WBC 2991 /hpf   Sq Epi: x / Non Sq Epi: 19 /hpf / Bacteria: x        RADIOLOGY & ADDITIONAL STUDIES:  < from: CT Angio Chest w/ IV Cont (18 @ 13:21) >  IMPRESSION:     No pulmonary embolus.    New small-moderate pleural effusions with adjacent compressive   atelectasis. Underlying infection cannot be ruled out. Recommend clinical   correlation for pneumonia.      Cultures:    Culture - Blood (18 @ 14:20)    Gram Stain:   Growth in anaerobic bottle: Gram Positive Cocci in Clusters  Growth in aerobic bottle: Gram Positive Cocci in Clusters    -  Coagulase negative Staphylococcus: Detec    Specimen Source: .Blood Blood-Peripheral    Organism: Blood Culture PCR    Culture Results:   Growth in anaerobic bottle: Gram Positive Cocci in Clusters  Growth in aerobic bottle: Gram Positive Cocci in Clusters  "Due to technical problems, Proteus sp. will Not be reported as part of  the BCID panel until further notice"  ***Blood Panel PCR results on this specimen are available  approximately 3 hours after the Gram stain result.***  Gram stain, PCR, and/or culture results may not always  correspond due to difference in methodologies.  ************************************************************  This PCR assay was performed using [employer].  The following targets are tested for: Enterococcus,  vancomycin resistant enterococci, Listeria monocytogenes,  coagulase negative staphylococci, S. aureus,  methicillin resistant S. aureus, Streptococcus agalactiae  (Group B), S. pneumoniae, S. pyogenes (Group A),  Acinetobacter baumannii, Enterobacter cloacae, E. coli,  Klebsiella oxytoca, K. pneumoniae, Proteus sp.,  Serratia marcescens, Haemophilus influenzae,  Neisseria meningitidis, Pseudomonas aeruginosa, Candida  albicans, C. glabrata, C krusei, C parapsilosis,  C. tropicalis and the KPC resistance gene.    Organism Identification: Blood Culture PCR    Method Type: PCR    Culture - Blood (18 @ 14:20)    Gram Stain:   Growth in anaerobic bottle: Gram Positive Cocci in Clusters  Growth in aerobic bottle: Gram Positive Cocci in Clusters    Specimen Source: .Blood Blood-Peripheral    Culture Results:   Growth in anaerobic bottle: Gram Positive Cocci in Clusters  Growth in aerobic bottle: Gram Positive Cocci in Clusters

## 2018-09-26 NOTE — PROGRESS NOTE ADULT - SUBJECTIVE AND OBJECTIVE BOX
Patient is a 77y old  Female who presents with a chief complaint of dysuria (25 Sep 2018 19:40)      SUBJECTIVE / OVERNIGHT EVENTS:  No chest pain. No shortness of breath. No complaints. No events overnight.     MEDICATIONS  (STANDING):  acetaminophen   Tablet .. 650 milliGRAM(s) Oral every 6 hours  aspirin enteric coated 81 milliGRAM(s) Oral daily  aztreonam  IVPB 1000 milliGRAM(s) IV Intermittent every 6 hours  buDESOnide 160 MICROgram(s)/formoterol 4.5 MICROgram(s) Inhaler 2 Puff(s) Inhalation two times a day  calcium carbonate 1250 mG  + Vitamin D (OsCal 500 + D) 1 Tablet(s) Oral three times a day  Dakins Solution - 1/4 Strength 1 Application(s) Topical two times a day  gabapentin 300 milliGRAM(s) Oral three times a day  loratadine 10 milliGRAM(s) Oral daily  melatonin 3 milliGRAM(s) Oral at bedtime  metoprolol tartrate 12.5 milliGRAM(s) Oral two times a day  minocycline 100 milliGRAM(s) Oral two times a day  pantoprazole    Tablet 40 milliGRAM(s) Oral before breakfast  polyethylene glycol 3350 17 Gram(s) Oral two times a day  predniSONE   Tablet 50 milliGRAM(s) Oral daily  simvastatin 20 milliGRAM(s) Oral at bedtime  tiotropium 18 MICROgram(s) Capsule 1 Capsule(s) Inhalation daily    MEDICATIONS  (PRN):  senna 2 Tablet(s) Oral at bedtime PRN Constipation  simethicone 80 milliGRAM(s) Chew two times a day PRN Gas  traMADol 25 milliGRAM(s) Oral three times a day PRN Moderate Pain (4 - 6)      Vital Signs Last 24 Hrs  T(C): 36.9 (26 Sep 2018 10:42), Max: 36.9 (26 Sep 2018 10:42)  T(F): 98.5 (26 Sep 2018 10:42), Max: 98.5 (26 Sep 2018 10:42)  HR: 74 (26 Sep 2018 10:42) (60 - 89)  BP: 122/72 (26 Sep 2018 10:42) (81/59 - 122/72)  BP(mean): --  RR: 18 (26 Sep 2018 10:42) (15 - 20)  SpO2: 92% (26 Sep 2018 10:42) (92% - 100%)  CAPILLARY BLOOD GLUCOSE      POCT Blood Glucose.: 136 mg/dL (25 Sep 2018 11:49)    I&O's Summary    25 Sep 2018 07:  -  26 Sep 2018 07:00  --------------------------------------------------------  IN: 0 mL / OUT: 550 mL / NET: -550 mL    26 Sep 2018 07:  -  26 Sep 2018 11:24  --------------------------------------------------------  IN: 240 mL / OUT: 0 mL / NET: 240 mL        PHYSICAL EXAM:  GENERAL: NAD, well-developed  HEAD:  Atraumatic, Normocephalic  EYES: EOMI, PERRLA, conjunctiva and sclera clear  NECK: Supple, No JVD  CHEST/LUNG: Clear to auscultation bilaterally; No wheeze  HEART: Regular rate and rhythm; No murmurs, rubs, or gallops  ABDOMEN: Soft, Nontender, Nondistended; Bowel sounds present  EXTREMITIES:  2+ Peripheral Pulses, No clubbing, cyanosis, or edema  PSYCH: AAOx3  NEUROLOGY: non-focal  SKIN: multiple wounds on lower ext.    LABS:                        7.3    15.1  )-----------( 188      ( 25 Sep 2018 11:50 )             22.7         137  |  105  |  52<H>  ----------------------------<  162<H>  5.2   |  24  |  1.02    Ca    8.3<L>      26 Sep 2018 09:25  Phos  4.7       Mg     1.7         TPro  4.7<L>  /  Alb  2.6<L>  /  TBili  0.1<L>  /  DBili  x   /  AST  15  /  ALT  21  /  AlkPhos  54      PT/INR - ( 25 Sep 2018 11:50 )   PT: 17.0 sec;   INR: 1.56 ratio         PTT - ( 25 Sep 2018 11:50 )  PTT:30.5 sec      Urinalysis Basic - ( 25 Sep 2018 10:30 )    Color: Orange / Appearance: Turbid / S.022 / pH: x  Gluc: x / Ketone: Negative  / Bili: Negative / Urobili: 2 mg/dL   Blood: x / Protein: 300 mg/dL / Nitrite: Negative   Leuk Esterase: Large / RBC: 785 /hpf / WBC 2991 /hpf   Sq Epi: x / Non Sq Epi: 19 /hpf / Bacteria: x  Culture - Blood (18 @ 14:20)    Gram Stain:   Growth in anaerobic bottle: Gram Positive Cocci in Clusters    -  Coagulase negative Staphylococcus: Detec    Specimen Source: .Blood Blood-Peripheral    Organism: Blood Culture PCR    Culture Results:   Growth in anaerobic bottle: Gram Positive Cocci in Clusters  "Due to technical problems, Proteus sp. will Not be reported as part of  the BCID panel until further notice"  ***Blood Panel PCR results on this specimen are available  approximately 3 hours after the Gram stain result.***  Gram stain, PCR, and/or culture results may not always  correspond due to difference in methodologies.  ************************************************************  This PCR assay was performed using [employer].  The following targets are tested for: Enterococcus,  vancomycin resistant enterococci, Listeria monocytogenes,  coagulase negative staphylococci, S. aureus,  methicillin resistant S. aureus, Streptococcus agalactiae  (Group B), S. pneumoniae, S. pyogenes (Group A),  Acinetobacter baumannii, Enterobacter cloacae, E. coli,  Klebsiella oxytoca, K. pneumoniae, Proteus sp.,  Serratia marcescens, Haemophilus influenzae,  Neisseria meningitidis, Pseudomonas aeruginosa, Candida  albicans, C. glabrata, C krusei, C parapsilosis,  C. tropicalis and the KPC resistance gene.    Organism Identification: Blood Culture PCR    Method Type: PCR          RADIOLOGY & ADDITIONAL TESTS:    Imaging Personally Reviewed:    Consultant(s) Notes Reviewed:      Care Discussed with Consultants/Other Providers:

## 2018-09-26 NOTE — CONSULT NOTE ADULT - ATTENDING COMMENTS
patient known from previous admission  Treated for right knee wound , s/p explant of total joint, s/p wound closure by plastics  Suspected PG , and started on Prednisone  Currently a/w suspected UTI and + BC  Exam confirms a moon facies  remains very obese, with reported ability to be oob to chair, but not ambulatory  Has at least 3 deep wounds of lateral right thigh , with healing wounds, improved , of right knee and lower abdomen- VAC has been removed form these last two  wounds  Pain precluded additional exam of left thigh wounds, pending administration of analgesics  ecchymosis of left thigh and lower leg  T/C with , Yonatan   PT to evaluate for VAC for right thigh wounds  remain available Above noted  patient known from previous admission  Treated for right knee wound , s/p explant of total joint, s/p wound closure by plastics  Suspected PG , and started on Prednisone  Currently a/w suspected UTI and + BC  Exam confirms a moon facies  remains very obese, with reported ability to be oob to chair, but not ambulatory  Has at least 3 deep wounds of lateral right thigh , with healing wounds, improved , of right knee and lower abdomen- VAC has been removed form these last two  wounds  Pain precluded additional exam of left thigh wounds, pending administration of analgesics  ecchymosis of left thigh and lower leg  T/C with , Yonatan   PT to evaluate for VAC for right thigh wounds  remain available

## 2018-09-26 NOTE — CONSULT NOTE ADULT - ASSESSMENT
A/P:76yo female pmh HTN, CAD with PPM, recent admission for infected right knee hardware on minocycline by PICC at rehab center p/w multiple episodes of syncope, found to be hypotensive 60/40 by EMS, started on IVF by EMS, received 100cc prior to arrival, also hypoxic to low 90s on RA. Pt c/o dysuria and foul-smelling urine x 5 days, c/o dizziness. Denies fever, chills, chest pain, dyspnea, palpitations, dizziness, weakness, recent cough, nausea, vomiting, diarrhea, abdominal pain, BRBPR, melena, back pain, leg swelling    Resolving Pyoderma Gangrenosum- Con't steriods-  Aquacel to wounds  Rt lateral thigh wounds- VAC  Consider Plastics as they were involved in care/ and reconstruction of Rt knee dehisced wound  BLE elevation  Abx per Medicine/ ID  Moisturize intact skin w/ SWEEN cream BID  con't Nutrition (as tolerated), Nutrition Consult  con't Offloading   con't Pericare  Care as per medicine will follow w/ you  Upon discharge f/u as outpatient at [facility] [address] [place] [address] [phone]  Seen w/ attng and D/w team  Thank you for this consult  Janki Cramer PA-C [id number]

## 2018-09-26 NOTE — CHART NOTE - NSCHARTNOTEFT_GEN_A_CORE
Pt seen and examined at bedside  D/w Dr Bass  no plan for orthopedic surgical intervention per family and patient preference  vac and wound care per PRS and wound care teams  per derm prednisone to treat possible atypical presentation of pyoderma granulosum, per report improving  PO abx per ID team, minocycline  encourage patient to spend majority of bed oob in bedside chair as able, has now been mobilizing to bedside chair, she must remain 50% wb on the RLE and NO KNEE MOTION allowed, knee immobilizer if oob  dvt ppx per primary  continue to optimize nutrition  continue to involve psych given depression symptoms	  outpatient f/u in office in 4-6 weeks, [phone]

## 2018-09-26 NOTE — PROGRESS NOTE ADULT - ASSESSMENT
76yo female pmh HTN, CAD with PPM, recent admission for infected right knee hardware on minocycline by PICC at rehab center p/w multiple episodes of syncope, found to be hypotensive 60/40 by EMS, started on IVF by EMS, received 100cc prior to arrival, also hypoxic to low 90s on RA. Pt c/o dysuria and foul-smelling urine x 5 days, c/o dizziness. Denies fever, chills, chest pain, dyspnea, palpitations, dizziness, weakness, recent cough, nausea, vomiting, diarrhea, abdominal pain, BRBPR, melena, back pain, leg swelling.      septic shock  - iv Aztreonam  - check urine and positive blood cultures with staph  - ID consult called    multiple wounds  - wound care consult    polymyalgia  - cw prednisone  - no need for stress dose steroids at this time    CAD  - c/w asa    lumbar disc disease  - cw Neurontin    copd  - cw Advair and Spiriva    GERD  - cw protonix    dvt px  - on coumadin    pt eval 78yo female pmh HTN, CAD with PPM, recent admission for infected right knee hardware on minocycline by PICC at rehab center p/w multiple episodes of syncope, found to be hypotensive 60/40 by EMS, started on IVF by EMS, received 100cc prior to arrival, also hypoxic to low 90s on RA. Pt c/o dysuria and foul-smelling urine x 5 days, c/o dizziness. Denies fever, chills, chest pain, dyspnea, palpitations, dizziness, weakness, recent cough, nausea, vomiting, diarrhea, abdominal pain, BRBPR, melena, back pain, leg swelling.      septic shock  - iv Aztreonam  - check urine and positive blood cultures with staph  - ID consult called    multiple wounds  - wound care consult    anemia  - s/p 1 unit of prbc  - monitor cbc  check stool for Occult blood    polymyalgia  - cw prednisone  - no need for stress dose steroids at this time    CAD  - c/w asa    lumbar disc disease  - cw Neurontin    copd  - cw Advair and Spiriva    GERD  - cw protonix    dvt px  - on coumadin    pt eval

## 2018-09-27 LAB
ANION GAP SERPL CALC-SCNC: 9 MMOL/L — SIGNIFICANT CHANGE UP (ref 5–17)
BUN SERPL-MCNC: 43 MG/DL — HIGH (ref 7–23)
CALCIUM SERPL-MCNC: 8 MG/DL — LOW (ref 8.4–10.5)
CHLORIDE SERPL-SCNC: 105 MMOL/L — SIGNIFICANT CHANGE UP (ref 96–108)
CO2 SERPL-SCNC: 26 MMOL/L — SIGNIFICANT CHANGE UP (ref 22–31)
CREAT SERPL-MCNC: 0.84 MG/DL — SIGNIFICANT CHANGE UP (ref 0.5–1.3)
CULTURE RESULTS: SIGNIFICANT CHANGE UP
GLUCOSE SERPL-MCNC: 85 MG/DL — SIGNIFICANT CHANGE UP (ref 70–99)
GRAM STN FLD: SIGNIFICANT CHANGE UP
GRAM STN FLD: SIGNIFICANT CHANGE UP
HCT VFR BLD CALC: 23.6 % — LOW (ref 34.5–45)
HGB BLD-MCNC: 7.4 G/DL — LOW (ref 11.5–15.5)
INR BLD: 2.8 RATIO — HIGH (ref 0.88–1.16)
MAGNESIUM SERPL-MCNC: 1.8 MG/DL — SIGNIFICANT CHANGE UP (ref 1.6–2.6)
MCHC RBC-ENTMCNC: 27.5 PG — SIGNIFICANT CHANGE UP (ref 27–34)
MCHC RBC-ENTMCNC: 31.4 GM/DL — LOW (ref 32–36)
MCV RBC AUTO: 87.7 FL — SIGNIFICANT CHANGE UP (ref 80–100)
NRBC # BLD: 2 /100 WBCS — HIGH (ref 0–0)
OB PNL STL: NEGATIVE — SIGNIFICANT CHANGE UP
ORGANISM # SPEC MICROSCOPIC CNT: SIGNIFICANT CHANGE UP
ORGANISM # SPEC MICROSCOPIC CNT: SIGNIFICANT CHANGE UP
PHOSPHATE SERPL-MCNC: 3.8 MG/DL — SIGNIFICANT CHANGE UP (ref 2.5–4.5)
PLATELET # BLD AUTO: 113 K/UL — LOW (ref 150–400)
POTASSIUM SERPL-MCNC: 4.5 MMOL/L — SIGNIFICANT CHANGE UP (ref 3.5–5.3)
POTASSIUM SERPL-SCNC: 4.5 MMOL/L — SIGNIFICANT CHANGE UP (ref 3.5–5.3)
PROTHROM AB SERPL-ACNC: 30.9 SEC — HIGH (ref 9.8–12.7)
RBC # BLD: 2.69 M/UL — LOW (ref 3.8–5.2)
RBC # FLD: 18.3 % — HIGH (ref 10.3–14.5)
SODIUM SERPL-SCNC: 140 MMOL/L — SIGNIFICANT CHANGE UP (ref 135–145)
SPECIMEN SOURCE: SIGNIFICANT CHANGE UP
SPECIMEN SOURCE: SIGNIFICANT CHANGE UP
WBC # BLD: 8.86 K/UL — SIGNIFICANT CHANGE UP (ref 3.8–10.5)
WBC # FLD AUTO: 8.86 K/UL — SIGNIFICANT CHANGE UP (ref 3.8–10.5)

## 2018-09-27 PROCEDURE — 99232 SBSQ HOSP IP/OBS MODERATE 35: CPT

## 2018-09-27 RX ORDER — CHLORHEXIDINE GLUCONATE 213 G/1000ML
1 SOLUTION TOPICAL
Qty: 0 | Refills: 0 | Status: DISCONTINUED | OUTPATIENT
Start: 2018-09-27 | End: 2018-10-05

## 2018-09-27 RX ORDER — WARFARIN SODIUM 2.5 MG/1
2 TABLET ORAL ONCE
Qty: 0 | Refills: 0 | Status: COMPLETED | OUTPATIENT
Start: 2018-09-27 | End: 2018-09-27

## 2018-09-27 RX ADMIN — Medication 250 MILLIGRAM(S): at 16:40

## 2018-09-27 RX ADMIN — GABAPENTIN 300 MILLIGRAM(S): 400 CAPSULE ORAL at 21:27

## 2018-09-27 RX ADMIN — GABAPENTIN 300 MILLIGRAM(S): 400 CAPSULE ORAL at 13:42

## 2018-09-27 RX ADMIN — Medication 81 MILLIGRAM(S): at 12:21

## 2018-09-27 RX ADMIN — POLYETHYLENE GLYCOL 3350 17 GRAM(S): 17 POWDER, FOR SOLUTION ORAL at 05:03

## 2018-09-27 RX ADMIN — Medication 50 MILLIGRAM(S): at 05:00

## 2018-09-27 RX ADMIN — Medication 12.5 MILLIGRAM(S): at 18:43

## 2018-09-27 RX ADMIN — Medication 650 MILLIGRAM(S): at 12:21

## 2018-09-27 RX ADMIN — Medication 12.5 MILLIGRAM(S): at 05:00

## 2018-09-27 RX ADMIN — BUDESONIDE AND FORMOTEROL FUMARATE DIHYDRATE 2 PUFF(S): 160; 4.5 AEROSOL RESPIRATORY (INHALATION) at 05:00

## 2018-09-27 RX ADMIN — Medication 1 TABLET(S): at 21:27

## 2018-09-27 RX ADMIN — Medication 1 TABLET(S): at 05:01

## 2018-09-27 RX ADMIN — Medication 1 TABLET(S): at 13:43

## 2018-09-27 RX ADMIN — PANTOPRAZOLE SODIUM 40 MILLIGRAM(S): 20 TABLET, DELAYED RELEASE ORAL at 05:00

## 2018-09-27 RX ADMIN — Medication 650 MILLIGRAM(S): at 05:41

## 2018-09-27 RX ADMIN — SIMVASTATIN 20 MILLIGRAM(S): 20 TABLET, FILM COATED ORAL at 21:28

## 2018-09-27 RX ADMIN — GABAPENTIN 300 MILLIGRAM(S): 400 CAPSULE ORAL at 05:02

## 2018-09-27 RX ADMIN — Medication 3 MILLIGRAM(S): at 21:28

## 2018-09-27 RX ADMIN — LORATADINE 10 MILLIGRAM(S): 10 TABLET ORAL at 12:22

## 2018-09-27 RX ADMIN — WARFARIN SODIUM 2 MILLIGRAM(S): 2.5 TABLET ORAL at 21:35

## 2018-09-27 RX ADMIN — Medication 650 MILLIGRAM(S): at 18:40

## 2018-09-27 RX ADMIN — Medication 1 APPLICATION(S): at 18:42

## 2018-09-27 RX ADMIN — BUDESONIDE AND FORMOTEROL FUMARATE DIHYDRATE 2 PUFF(S): 160; 4.5 AEROSOL RESPIRATORY (INHALATION) at 18:42

## 2018-09-27 RX ADMIN — TIOTROPIUM BROMIDE 1 CAPSULE(S): 18 CAPSULE ORAL; RESPIRATORY (INHALATION) at 05:00

## 2018-09-27 RX ADMIN — Medication 1 APPLICATION(S): at 05:02

## 2018-09-27 RX ADMIN — Medication 650 MILLIGRAM(S): at 21:29

## 2018-09-27 RX ADMIN — Medication 650 MILLIGRAM(S): at 05:01

## 2018-09-27 NOTE — PHYSICAL THERAPY INITIAL EVALUATION ADULT - DISCHARGE DISPOSITION, PT EVAL
patient declines subacute rehab & prefers to go home with home Physical Therapy where she states she has all equipment

## 2018-09-27 NOTE — CHART NOTE - NSCHARTNOTEFT_GEN_A_CORE
Called by RN earlier today for RN noting large area of bruising to right hip area. Assessed pt while in be and also noted multiple areas of ecchymosis. Pt denies pain in that area.  D/w Dr. Fernandez, will continue to monitor. Primary team to f/u with cbc in am and monitor plt as it is starting to trend down. Stool occult negative. Will continue with coumadin for now.   Enid NP   [id number]

## 2018-09-27 NOTE — PROGRESS NOTE ADULT - SUBJECTIVE AND OBJECTIVE BOX
was doing well at [facility] preparing for dc then yesterday had hypotensive episode with LOC  brought to ED found to have bacteremia, anemia  admitted to medicine for work up for new anemia but she does have chronic anemia of disease, has been followed by hematology during her long hospitalizations and put on epo  pain well controlled  psychologically in good spirits  wounds healing  per patient report has been receiving lovenox at [facility] despite being on coumadin at hospital discharge  has new ecchymosis throughout body    Active Problems  Abnormal chest CT (793.2) (R93.8)  Aftercare following explantation of joint prosthesis, staged procedure (V54.82) (Z47.89)  Aftercare following joint replacement (V54.81) (Z47.1)  Allergic rhinitis (477.9) (J30.9)  Aortic stenosis (424.1) (I35.0)  Asthma (493.90) (J45.909)  Coronary artery disease (414.00) (I25.10)  Diastolic congestive heart failure (428.30,428.0) (I50.30)  DVT (deep venous thrombosis) (453.40) (I82.409)  Encounter for consultation (V65.9) (Z71.9)  First degree atrioventricular block (426.11) (I44.0)  GERD without esophagitis (530.81) (K21.9)  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 (V13.59) (Z87.39)  History of knee replacement, total, right (V43.65) (Z96.651)  History of left knee replacement (V43.65) (Z96.652)  History of total knee replacement, bilateral (V43.65) (Z96.653)  Hyperlipidemia (272.4) (E78.5)  Hypertension (401.9) (I10)  Infection of total knee replacement, initial encounter (996.66,V43.65) (T84.59XA,Z96.659)  LBBB (left bundle branch block) (426.3) (I44.7)  Low back pain (724.2) (M54.5)  Lumbar radicular pain (724.4) (M54.16)  Morbid obesity (278.01) (E66.01)  Non-healing wound of lower extremity, initial encounter (894.1) (S81.809A)  MYAH (obstructive sleep apnea) (327.23) (G47.33)  Right shoulder pain (719.41) (M25.511)  Right shoulder tendonitis (726.10) (M75.81)  Sciatica, right  SOB (shortness of breath) (786.05) (R06.02)  Wound of right lower extremity, initial encounter (894.0) (S81.801A)    Past Medical History  History of asthma (V12.69) (Z87.09)  History of osteoporosis (V13.59) (Z87.39)  History of Idiopathic peripheral neuropathy (356.9) (G60.9)  History of Arthritis (V13.4)  History of anemia (V12.3) (Z86.2)  History of hypercholesterolemia (V12.29) (Z86.39)  History of Urinary Tract Infection (V13.02)    Current Meds  See chart    Allergies  Amoxicillin TABS    Review of Systems    Musculoskeletal: joint stiffness, but no joint pain.   Integumentary: skin lesions  Psychiatric: depression.   Constitutional, Eyes, ENT, Cardiovascular, Respiratory, Gastrointestinal, Genitourinary, Integumentary, Neurological, Psychiatric, Endocrine and Heme/Lymph review of systems are otherwise negative except as noted in HPI.      Physical Exam  afvss  nad  abdominal wound - wtd  RLE  thigh - dressing per wound care teams, lateral wound wet to dry per prs/wc teams  knee wound now with dry dressing, epithelializing, almost completely covered no drainage  remainder of wounds dressed by wound care  5/5 ta/ehl/gcs  silt l4-s1  2+ dp pulse     Radiographs       R knee ap and lateral xray performed yesterday and unchanged from prior imaging, no new fractures

## 2018-09-27 NOTE — PROGRESS NOTE ADULT - ASSESSMENT
76yo female pmh HTN, CAD with PPM, recent admission for infected right knee hardware on minocycline by PICC at rehab center p/w multiple episodes of syncope, found to be hypotensive 60/40 by EMS, started on IVF by EMS, received 100cc prior to arrival, also hypoxic to low 90s on RA. Pt c/o dysuria and foul-smelling urine x 5 days, c/o dizziness. Denies fever, chills, chest pain, dyspnea, palpitations, dizziness, weakness, recent cough, nausea, vomiting, diarrhea, abdominal pain, BRBPR, melena, back pain, leg swelling.      septic shock  -Repeat blood cultures to be sure not contaminant then start iv vancomycin 1 g daily  - stop minocycline and aztreonam since urine culture is negative  - remove picc once new iv access has been secured  - if follow up blood cultures grow will need to consider zoe since has tavr  - continue local wound care per dr ramirez  - ortho follows for knee    multiple wounds  - wound care consult following    anemia  - s/p 1 unit of prbc  - monitor cbc  - check stool for Occult blood    polymyalgia  - cw prednisone  - no need for stress dose steroids at this time    CAD  - c/w asa    lumbar disc disease  - cw Neurontin    copd  - cw Advair and Spiriva    GERD  - cw Protonix    dvt px  - on coumadin    pt eval

## 2018-09-27 NOTE — PHYSICAL THERAPY INITIAL EVALUATION ADULT - GENERAL OBSERVATIONS, REHAB EVAL
Rec'd in bed, NAD, +lund, +IC, multiple large ecchymoses LE's, Rt pelvis/flank (pt and aide deny any trauma)

## 2018-09-27 NOTE — PHYSICAL THERAPY INITIAL EVALUATION ADULT - PLANNED THERAPY INTERVENTIONS, PT EVAL
Negative Pressure Wound Therapy bed mobility training/Negative Pressure Wound Therapy/transfer training

## 2018-09-27 NOTE — PHYSICAL THERAPY INITIAL EVALUATION ADULT - RANGE OF MOTION EXAMINATION, REHAB EVAL
bilateral upper extremity ROM was WFL (within functional limits)/Left LE ROM was WFL (within functional limits)/right Hip & knee not tested

## 2018-09-27 NOTE — PROGRESS NOTE ADULT - ASSESSMENT
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 requiring I&D, TKA explant, static spacer placement with complex wound closure by plastic surgery complicated by wound healing problems and development of pyoderma granulosum    wounds are managed by PRS/wound care  please reconsult rheumatology/dermatology before stopping prednisone as this is the only medication that has made it possible to manage her pyoderma granulosum which lead to multiple lower extremity and abdominal subcutaneous wounds with full thickness skin necrosis  po abx per ID, has been on lifelong minocycline but now has GPC and GNR in blood likely 2/2 chronic picc use  may need PICC exchanged, has poor peripheral vascular access for daily blood draws  continue to spend majority of day oob to wheelchair, okay to ambulate but must remain 50% weightbearing on RLE with NO KNEE MOTION allowed, knee immobilizer at all times if OOB  DVT ppx per primary, has hx DVT  Nutrition consult recommended, optimize nutrition for continued wound healing  F/u with ortho after dc 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 requiring I&D, TKA explant, static spacer placement with complex wound closure by plastic surgery complicated by wound healing problems and development of pyoderma granulosum    wounds are managed by PRS/wound care  please reconsult rheumatology/dermatology before stopping prednisone as this is the only medication that has made it possible to manage her pyoderma granulosum which lead to multiple lower extremity and abdominal subcutaneous wounds with full thickness skin necrosis  po abx per ID, has been on lifelong minocycline but now has GPC and GNR in blood likely 2/2 chronic picc use  may need PICC exchanged, has poor peripheral vascular access for daily blood draws  continue to spend majority of day oob to wheelchair, okay to ambulate but must remain 50% weightbearing on RLE with NO KNEE MOTION allowed, knee immobilizer at all times if OOB  DVT ppx per primary, has hx DVT  Transfuse prn to hct>30 recommended for optimizing wound healing, consider hematology reconsult	  Nutrition consult recommended, optimize nutrition for continued wound healing  F/u with ortho after dc

## 2018-09-27 NOTE — PROGRESS NOTE ADULT - SUBJECTIVE AND OBJECTIVE BOX
Patient is a 77y old  Female who presents with a chief complaint of dysuria, bacteremia, anemia (27 Sep 2018 09:12)      SUBJECTIVE / OVERNIGHT EVENTS:  No chest pain. No shortness of breath. No complaints. No events overnight.     MEDICATIONS  (STANDING):  acetaminophen   Tablet .. 650 milliGRAM(s) Oral every 6 hours  aspirin enteric coated 81 milliGRAM(s) Oral daily  buDESOnide 160 MICROgram(s)/formoterol 4.5 MICROgram(s) Inhaler 2 Puff(s) Inhalation two times a day  calcium carbonate 1250 mG  + Vitamin D (OsCal 500 + D) 1 Tablet(s) Oral three times a day  Dakins Solution - 1/4 Strength 1 Application(s) Topical two times a day  gabapentin 300 milliGRAM(s) Oral three times a day  loratadine 10 milliGRAM(s) Oral daily  melatonin 3 milliGRAM(s) Oral at bedtime  metoprolol tartrate 12.5 milliGRAM(s) Oral two times a day  pantoprazole    Tablet 40 milliGRAM(s) Oral before breakfast  polyethylene glycol 3350 17 Gram(s) Oral two times a day  predniSONE   Tablet 50 milliGRAM(s) Oral daily  simvastatin 20 milliGRAM(s) Oral at bedtime  tiotropium 18 MICROgram(s) Capsule 1 Capsule(s) Inhalation daily  vancomycin  IVPB 1000 milliGRAM(s) IV Intermittent every 24 hours  vancomycin  IVPB        MEDICATIONS  (PRN):  senna 2 Tablet(s) Oral at bedtime PRN Constipation  simethicone 80 milliGRAM(s) Chew two times a day PRN Gas  traMADol 25 milliGRAM(s) Oral three times a day PRN Moderate Pain (4 - 6)      Vital Signs Last 24 Hrs  T(C): 36.4 (27 Sep 2018 08:13), Max: 36.9 (26 Sep 2018 19:00)  T(F): 97.5 (27 Sep 2018 08:13), Max: 98.4 (26 Sep 2018 19:00)  HR: 68 (27 Sep 2018 08:13) (60 - 104)  BP: 132/80 (27 Sep 2018 08:13) (105/69 - 133/76)  BP(mean): --  RR: 18 (27 Sep 2018 08:13) (16 - 18)  SpO2: 98% (27 Sep 2018 08:13) (93% - 98%)  CAPILLARY BLOOD GLUCOSE        I&O's Summary    26 Sep 2018 07:01  -  27 Sep 2018 07:00  --------------------------------------------------------  IN: 420 mL / OUT: 1200 mL / NET: -780 mL        PHYSICAL EXAM:  GENERAL: NAD, well-developed, obeses  HEAD:  Atraumatic, Normocephalic  EYES: EOMI, PERRLA, conjunctiva and sclera clear  NECK: Supple, No JVD  CHEST/LUNG: Clear to auscultation bilaterally; No wheeze  HEART: Regular rate and rhythm; No murmurs, rubs, or gallops  ABDOMEN: Soft, Nontender, Nondistended; Bowel sounds present  EXTREMITIES:  2+ Peripheral Pulses, No clubbing, cyanosis, or edema  PSYCH: AAOx3  NEUROLOGY: generalized weakness    SKIN: No rashes or lesions    LABS:                        6.3    12.70 )-----------( 132      ( 26 Sep 2018 12:18 )             20.5     09-27    140  |  105  |  43<H>  ----------------------------<  85  4.5   |  26  |  0.84    Ca    8.0<L>      27 Sep 2018 07:34  Phos  3.8     09-27  Mg     1.8     09-27    TPro  4.7<L>  /  Alb  2.6<L>  /  TBili  0.1<L>  /  DBili  x   /  AST  15  /  ALT  21  /  AlkPhos  54  09-25    PT/INR - ( 26 Sep 2018 12:18 )   PT: 17.7 sec;   INR: 1.55 ratio         PTT - ( 26 Sep 2018 12:18 )  PTT:28.2 sec          RADIOLOGY & ADDITIONAL TESTS:    Imaging Personally Reviewed:    Consultant(s) Notes Reviewed:      Care Discussed with Consultants/Other Providers:

## 2018-09-27 NOTE — PROGRESS NOTE ADULT - SUBJECTIVE AND OBJECTIVE BOX
CC: f/u for coag neg staph bacteremia    Patient reports  she wants to go home, everything hurts  REVIEW OF SYSTEMS:  All other review of systems negative (Comprehensive ROS)    Antimicrobials Day #  :2  vancomycin  IVPB 1000 milliGRAM(s) IV Intermittent every 24 hours  vancomycin  IVPB        Other Medications Reviewed    T(F): 97.5 (09-27-18 @ 08:13), Max: 98.4 (09-26-18 @ 19:00)  HR: 68 (09-27-18 @ 08:13)  BP: 132/80 (09-27-18 @ 08:13)  RR: 18 (09-27-18 @ 08:13)  SpO2: 98% (09-27-18 @ 08:13)  Wt(kg): --    PHYSICAL EXAM:  General: alert, no acute distress  Eyes:  anicteric, no conjunctival injection, no discharge  Oropharynx: no lesions or injection 	  Neck: supple, without adenopathy  Lungs: poor effort to auscultation  Heart: regular rate and rhythm; 2/6 sys m  Abdomen: soft, nondistended, nontender, without mass or organomegaly  Skin: no lesions  Extremities: no clubbing, cyanosis,. clean wounds on thighs and right knee  Neurologic: alert, oriented, moves all extremities    LAB RESULTS:                        7.4    8.86  )-----------( 113      ( 27 Sep 2018 09:40 )             23.6     09-27    140  |  105  |  43<H>  ----------------------------<  85  4.5   |  26  |  0.84    Ca    8.0<L>      27 Sep 2018 07:34  Phos  3.8     09-27  Mg     1.8     09-27          MICROBIOLOGY:  RECENT CULTURES:  09-25 @ 14:20 .Blood Blood-Peripheral Blood Culture PCR    Growth in anaerobic bottle: Gram Positive Cocci in Clusters  Growth in aerobic bottle: Gram Positive Cocci in Clusters    Growth in anaerobic bottle: Gram Positive Cocci in Clusters  Growth in aerobic bottle: Gram Positive Cocci in Clusters    09-25 @ 14:07 .Urine Clean Catch (Midstream)     No growth          RADIOLOGY REVIEWED:    < from: US TTE 2D F/U, Limited w/o Contrast (ED) (09.25.18 @ 16:41) >    Procedure was performed in the Emergency Department by a credentialed   Emergency Medicine Attending Physician    EXAM:  ER TTE LIMITED      ORDER COMMENTS: CAD with PPM found to be hypotensive     PROCEDURE DATE:  09/25/2018    FOCUSED ED ULTRASOUND REPORT          INTERPRETATION:  A focused transthoracic cardiac ultrasound examination   was performed.   No significant pericardial effusion was present.  No global wall motion abnormality was identified/  A left pleural effusion was seen  No anterior B lines visualized.    IMPRESSION:   No significant Pericardial Effusion.        < end of copied text >      Assessment:  Patient with neutrophilic dermatosis, chronic right tkr infection now spacer with slow healing wound, tavr, recent appendicitis returns with presyncope, transient sob, blood grew cns and patient has a picc line for months raising concern for line infection.   Plan:  continue vancomycin  await result of repeat blood cultures taken before vanco commenced. If they grow will need to consider true bacteremia and then consider pulling the line  May need ERIKA if true bacteremia since has a tavr

## 2018-09-27 NOTE — PHYSICAL THERAPY INITIAL EVALUATION ADULT - MODALITIES TREATMENT COMMENTS
Pt with three large full thickness ulcerations Rt lateral thigh of unknown etiology. Pt with hx mulltiple ulcerations of unknown etiology

## 2018-09-27 NOTE — PHYSICAL THERAPY INITIAL EVALUATION ADULT - PERTINENT HX OF CURRENT PROBLEM, REHAB EVAL
Pt is a 76yo female pmh HTN, CAD with PPM, recent admission for infected right knee hardware on minocycline by PICC at rehab center p/w multiple episodes of syncope, found to be hypotensive 60/40 by EMS, started on IVF by EMS, received 100cc prior to arrival, also hypoxic to low 90s on RA. Pt c/o dysuria and foul-smelling urine x 5 days, c/o dizziness. CTA Chest 9/25/18: No pulmonary embolus. New small-moderate pleural effusions with adjacent compressive atelectasis. (-) US TTE 9/25/18. Cont'd.

## 2018-09-28 LAB
-  AMPICILLIN/SULBACTAM: SIGNIFICANT CHANGE UP
-  CEFAZOLIN: SIGNIFICANT CHANGE UP
-  CLINDAMYCIN: SIGNIFICANT CHANGE UP
-  ERYTHROMYCIN: SIGNIFICANT CHANGE UP
-  GENTAMICIN: SIGNIFICANT CHANGE UP
-  OXACILLIN: SIGNIFICANT CHANGE UP
-  PENICILLIN: SIGNIFICANT CHANGE UP
-  RIFAMPIN: SIGNIFICANT CHANGE UP
-  TETRACYCLINE: SIGNIFICANT CHANGE UP
-  TRIMETHOPRIM/SULFAMETHOXAZOLE: SIGNIFICANT CHANGE UP
-  VANCOMYCIN: SIGNIFICANT CHANGE UP
ANION GAP SERPL CALC-SCNC: 15 MMOL/L — SIGNIFICANT CHANGE UP (ref 5–17)
BUN SERPL-MCNC: 34 MG/DL — HIGH (ref 7–23)
CALCIUM SERPL-MCNC: 8.5 MG/DL — SIGNIFICANT CHANGE UP (ref 8.4–10.5)
CHLORIDE SERPL-SCNC: 107 MMOL/L — SIGNIFICANT CHANGE UP (ref 96–108)
CO2 SERPL-SCNC: 19 MMOL/L — LOW (ref 22–31)
CREAT SERPL-MCNC: 0.86 MG/DL — SIGNIFICANT CHANGE UP (ref 0.5–1.3)
CULTURE RESULTS: SIGNIFICANT CHANGE UP
CULTURE RESULTS: SIGNIFICANT CHANGE UP
GLUCOSE SERPL-MCNC: 111 MG/DL — HIGH (ref 70–99)
HCT VFR BLD CALC: 24.1 % — LOW (ref 34.5–45)
HGB BLD-MCNC: 7.8 G/DL — LOW (ref 11.5–15.5)
INR BLD: 3.25 RATIO — HIGH (ref 0.88–1.16)
MCHC RBC-ENTMCNC: 28.3 PG — SIGNIFICANT CHANGE UP (ref 27–34)
MCHC RBC-ENTMCNC: 32.5 GM/DL — SIGNIFICANT CHANGE UP (ref 32–36)
MCV RBC AUTO: 87.1 FL — SIGNIFICANT CHANGE UP (ref 80–100)
METHOD TYPE: SIGNIFICANT CHANGE UP
ORGANISM # SPEC MICROSCOPIC CNT: SIGNIFICANT CHANGE UP
ORGANISM # SPEC MICROSCOPIC CNT: SIGNIFICANT CHANGE UP
PLATELET # BLD AUTO: 123 K/UL — LOW (ref 150–400)
POTASSIUM SERPL-MCNC: 5.2 MMOL/L — SIGNIFICANT CHANGE UP (ref 3.5–5.3)
POTASSIUM SERPL-SCNC: 5.2 MMOL/L — SIGNIFICANT CHANGE UP (ref 3.5–5.3)
PROTHROM AB SERPL-ACNC: 36.3 SEC — HIGH (ref 9.8–12.7)
RBC # BLD: 2.77 M/UL — LOW (ref 3.8–5.2)
RBC # FLD: 17.2 % — HIGH (ref 10.3–14.5)
SODIUM SERPL-SCNC: 141 MMOL/L — SIGNIFICANT CHANGE UP (ref 135–145)
SPECIMEN SOURCE: SIGNIFICANT CHANGE UP
SPECIMEN SOURCE: SIGNIFICANT CHANGE UP
VANCOMYCIN TROUGH SERPL-MCNC: 8.4 UG/ML — LOW (ref 10–20)
WBC # BLD: 9.3 K/UL — SIGNIFICANT CHANGE UP (ref 3.8–10.5)
WBC # FLD AUTO: 9.3 K/UL — SIGNIFICANT CHANGE UP (ref 3.8–10.5)

## 2018-09-28 PROCEDURE — 99232 SBSQ HOSP IP/OBS MODERATE 35: CPT

## 2018-09-28 RX ORDER — ACETAMINOPHEN 500 MG
650 TABLET ORAL ONCE
Qty: 0 | Refills: 0 | Status: COMPLETED | OUTPATIENT
Start: 2018-09-28 | End: 2018-09-28

## 2018-09-28 RX ADMIN — Medication 12.5 MILLIGRAM(S): at 18:34

## 2018-09-28 RX ADMIN — Medication 12.5 MILLIGRAM(S): at 05:23

## 2018-09-28 RX ADMIN — BUDESONIDE AND FORMOTEROL FUMARATE DIHYDRATE 2 PUFF(S): 160; 4.5 AEROSOL RESPIRATORY (INHALATION) at 18:34

## 2018-09-28 RX ADMIN — SIMVASTATIN 20 MILLIGRAM(S): 20 TABLET, FILM COATED ORAL at 21:42

## 2018-09-28 RX ADMIN — POLYETHYLENE GLYCOL 3350 17 GRAM(S): 17 POWDER, FOR SOLUTION ORAL at 05:25

## 2018-09-28 RX ADMIN — Medication 650 MILLIGRAM(S): at 05:24

## 2018-09-28 RX ADMIN — Medication 650 MILLIGRAM(S): at 11:27

## 2018-09-28 RX ADMIN — GABAPENTIN 300 MILLIGRAM(S): 400 CAPSULE ORAL at 05:24

## 2018-09-28 RX ADMIN — BUDESONIDE AND FORMOTEROL FUMARATE DIHYDRATE 2 PUFF(S): 160; 4.5 AEROSOL RESPIRATORY (INHALATION) at 05:23

## 2018-09-28 RX ADMIN — Medication 650 MILLIGRAM(S): at 10:34

## 2018-09-28 RX ADMIN — Medication 50 MILLIGRAM(S): at 05:25

## 2018-09-28 RX ADMIN — Medication 650 MILLIGRAM(S): at 15:56

## 2018-09-28 RX ADMIN — PANTOPRAZOLE SODIUM 40 MILLIGRAM(S): 20 TABLET, DELAYED RELEASE ORAL at 05:24

## 2018-09-28 RX ADMIN — Medication 1 TABLET(S): at 13:29

## 2018-09-28 RX ADMIN — Medication 1 APPLICATION(S): at 05:25

## 2018-09-28 RX ADMIN — TRAMADOL HYDROCHLORIDE 25 MILLIGRAM(S): 50 TABLET ORAL at 13:29

## 2018-09-28 RX ADMIN — Medication 1 APPLICATION(S): at 17:12

## 2018-09-28 RX ADMIN — Medication 250 MILLIGRAM(S): at 18:33

## 2018-09-28 RX ADMIN — CHLORHEXIDINE GLUCONATE 1 APPLICATION(S): 213 SOLUTION TOPICAL at 07:54

## 2018-09-28 RX ADMIN — Medication 81 MILLIGRAM(S): at 13:30

## 2018-09-28 RX ADMIN — Medication 1 TABLET(S): at 05:23

## 2018-09-28 RX ADMIN — LORATADINE 10 MILLIGRAM(S): 10 TABLET ORAL at 13:30

## 2018-09-28 RX ADMIN — GABAPENTIN 300 MILLIGRAM(S): 400 CAPSULE ORAL at 13:30

## 2018-09-28 RX ADMIN — TIOTROPIUM BROMIDE 1 CAPSULE(S): 18 CAPSULE ORAL; RESPIRATORY (INHALATION) at 05:24

## 2018-09-28 RX ADMIN — GABAPENTIN 300 MILLIGRAM(S): 400 CAPSULE ORAL at 21:42

## 2018-09-28 RX ADMIN — Medication 1 TABLET(S): at 21:42

## 2018-09-28 RX ADMIN — Medication 650 MILLIGRAM(S): at 21:43

## 2018-09-28 RX ADMIN — Medication 3 MILLIGRAM(S): at 21:42

## 2018-09-28 NOTE — PROGRESS NOTE ADULT - SUBJECTIVE AND OBJECTIVE BOX
Patient is a 77y old  Female who presents with a chief complaint of dysuria (27 Sep 2018 14:10)      SUBJECTIVE / OVERNIGHT EVENTS:  pt has no complaints    MEDICATIONS  (STANDING):  acetaminophen   Tablet .. 650 milliGRAM(s) Oral every 6 hours  aspirin enteric coated 81 milliGRAM(s) Oral daily  buDESOnide 160 MICROgram(s)/formoterol 4.5 MICROgram(s) Inhaler 2 Puff(s) Inhalation two times a day  calcium carbonate 1250 mG  + Vitamin D (OsCal 500 + D) 1 Tablet(s) Oral three times a day  chlorhexidine 4% Liquid 1 Application(s) Topical <User Schedule>  Dakins Solution - 1/4 Strength 1 Application(s) Topical two times a day  gabapentin 300 milliGRAM(s) Oral three times a day  loratadine 10 milliGRAM(s) Oral daily  melatonin 3 milliGRAM(s) Oral at bedtime  metoprolol tartrate 12.5 milliGRAM(s) Oral two times a day  pantoprazole    Tablet 40 milliGRAM(s) Oral before breakfast  polyethylene glycol 3350 17 Gram(s) Oral two times a day  predniSONE   Tablet 50 milliGRAM(s) Oral daily  simvastatin 20 milliGRAM(s) Oral at bedtime  tiotropium 18 MICROgram(s) Capsule 1 Capsule(s) Inhalation daily  vancomycin  IVPB 1000 milliGRAM(s) IV Intermittent every 24 hours  vancomycin  IVPB        MEDICATIONS  (PRN):  senna 2 Tablet(s) Oral at bedtime PRN Constipation  simethicone 80 milliGRAM(s) Chew two times a day PRN Gas  traMADol 25 milliGRAM(s) Oral three times a day PRN Moderate Pain (4 - 6)      Vital Signs Last 24 Hrs  T(C): 36.6 (28 Sep 2018 08:26), Max: 36.6 (28 Sep 2018 05:22)  T(F): 97.9 (28 Sep 2018 08:26), Max: 97.9 (28 Sep 2018 08:26)  HR: 64 (28 Sep 2018 08:26) (59 - 67)  BP: 138/77 (28 Sep 2018 08:26) (133/73 - 138/77)  BP(mean): --  RR: 18 (28 Sep 2018 08:26) (18 - 18)  SpO2: 97% (28 Sep 2018 08:26) (96% - 98%)  CAPILLARY BLOOD GLUCOSE        I&O's Summary    27 Sep 2018 07:01  -  28 Sep 2018 07:00  --------------------------------------------------------  IN: 1190 mL / OUT: 2050 mL / NET: -860 mL        PHYSICAL EXAM:  GENERAL: NAD, well-developed  HEAD:  Atraumatic, Normocephalic  EYES: EOMI, PERRLA, conjunctiva and sclera clear  NECK: Supple, No JVD  CHEST/LUNG: Clear to auscultation bilaterally; No wheeze  HEART: Regular rate and rhythm; No murmurs, rubs, or gallops  ABDOMEN: Soft, Nontender, Nondistended; Bowel sounds present  EXTREMITIES:  2+ Peripheral Pulses, No clubbing, cyanosis, or edema  PSYCH: AAOx3  NEUROLOGY: non-focal  SKIN: No rashes or lesions    LABS:                        7.8    9.3   )-----------( 123      ( 28 Sep 2018 09:16 )             24.1     09-28    141  |  107  |  34<H>  ----------------------------<  111<H>  5.2   |  19<L>  |  0.86    Ca    8.5      28 Sep 2018 09:03  Phos  3.8     09-27  Mg     1.8     09-27      PT/INR - ( 28 Sep 2018 09:17 )   PT: 36.3 sec;   INR: 3.25 ratio         PTT - ( 26 Sep 2018 12:18 )  PTT:28.2 sec          RADIOLOGY & ADDITIONAL TESTS:    Imaging Personally Reviewed:    Consultant(s) Notes Reviewed:      Care Discussed with Consultants/Other Providers:

## 2018-09-28 NOTE — PROGRESS NOTE ADULT - ASSESSMENT
Morbid obesity.  history of PMR and neutrophilic dermatosis, managed with steroids  Failed MRSA rt prosthetic knee infection, s/p explant but still has retained hardware  AS, s/p TAVR  Now admitted with sustained coag negative staph bacteremia with indwelling PICC line  Suggest:  1.continue vancomycin, day 3  2.Advise removal of PICC line with repeat blood cultures after PICC removal  3.Appreciate Dr Fernandez's note, I think she may require ERIKA to help determine length of therapy required.PICC line coag negative staph bacteremias can be treated with 5-7 days following bloodstream clearance however if ERLIN is involved it changes approach

## 2018-09-28 NOTE — CHART NOTE - NSCHARTNOTEFT_GEN_A_CORE
R arm picc line was removed as recommended by ID.  Line was intact, no active bleeding noted.  Blood Cx x 1 ordered.  Nancy Cash NP  [phone]

## 2018-09-28 NOTE — PROGRESS NOTE ADULT - ASSESSMENT
78yo female pmh HTN, CAD with PPM, recent admission for infected right knee hardware on minocycline by PICC at rehab center p/w multiple episodes of syncope, found to be hypotensive 60/40 by EMS, started on IVF by EMS, received 100cc prior to arrival, also hypoxic to low 90s on RA. Pt c/o dysuria and foul-smelling urine x 5 days, c/o dizziness. Denies fever, chills, chest pain, dyspnea, palpitations, dizziness, weakness, recent cough, nausea, vomiting, diarrhea, abdominal pain, BRBPR, melena, back pain, leg swelling.      septic shock  -Repeat blood cultures to be sure not contaminant then start iv vancomycin 1 g daily  - stop minocycline and aztreonam since urine culture is negative  - remove picc once new iv access has been secured  - if follow up blood cultures grow will need to consider zoe since has tavr  - continue local wound care per dr ramirez  - ortho follows for knee    multiple wounds  - wound care consult following    anemia  - s/p 1 unit of prbc  - monitor cbc  - check stool for Occult blood    polymyalgia  - cw prednisone  - no need for stress dose steroids at this time    CAD  - c/w asa    lumbar disc disease  - cw Neurontin    copd  - cw Advair and Spiriva    GERD  - cw Protonix    dvt px  - on coumadin    pt eval 78yo female pmh HTN, CAD with PPM, recent admission for infected right knee hardware on minocycline by PICC at rehab center p/w multiple episodes of syncope, found to be hypotensive 60/40 by EMS, started on IVF by EMS, received 100cc prior to arrival, also hypoxic to low 90s on RA. Pt c/o dysuria and foul-smelling urine x 5 days, c/o dizziness. Denies fever, chills, chest pain, dyspnea, palpitations, dizziness, weakness, recent cough, nausea, vomiting, diarrhea, abdominal pain, BRBPR, melena, back pain, leg swelling.      septic shock  -Repeat blood cultures to be sure not contaminant then start iv vancomycin 1 g daily  - stop minocycline and aztreonam since urine culture is negative  - remove picc once new iv access has been secured  - if follow up blood cultures grow will need to consider zoe since has tavr  - continue local wound care per dr ramirez  - ortho follows for knee    multiple wounds  - wound care consult following    anemia  - s/p 1 unit of prbc  - monitor cbc  - check stool for Occult blood    polymyalgia  - cw prednisone    CAD  - c/w asa    lumbar disc disease  - cw Neurontin    copd  - cw Advair and Spiriva    GERD  - cw Protonix    dvt px  - on coumadin  - hold for inr over 3  pt eval

## 2018-09-28 NOTE — PROGRESS NOTE ADULT - ATTENDING COMMENTS
Asked to re evaluate for a right flank wound  Patient again at bedrest, MARTINE Mandel facies again noted , compared to prior admission  Extreme fragility of skin in general with multiple scattered ecchymosis  There is a large area of intense erythema , associated with at least skin tears of ~ several cm , with sero sanguinous discharge  Aquacel applied , tape to be avoided  Abd is obese, but soft , and nontender  Use of prednisone may play a role in skin atrophy , and fragility ,but this is necessary  Right thigh wounds being treated with VAC- tolerating this Reevaluation requested for right flank- hip wound  Remains on Coumadin with elevated PT  Remains bacteremic - ID note read  Awake, alert, NAD  Moon facies  Large ecchymotic region right flank, hip with several ~ 3 cm skin tears associated with drainage of sero sanguinous fluid  VAC in place wounds right lateral thigh  Right flank skin tears and ecchymosis related to use of coumadin and Prednisone  Still needs maximal assistance to rotate in bed  Aquacel dressing, no tape, applied  remain available  Lissa Josue, present

## 2018-09-28 NOTE — PROGRESS NOTE ADULT - SUBJECTIVE AND OBJECTIVE BOX
CC: f/u for coag negative staph bacteremia    Patient reports: she is feeling okay today, urine in lund is clear, no dyspnea or chest pain.No abdominal complaints    REVIEW OF SYSTEMS:  All other review of systems negative (Comprehensive ROS)    Antimicrobials Day #  :day 3  vancomycin  IVPB 1000 milliGRAM(s) IV Intermittent every 24 hours  vancomycin  IVPB        Other Medications Reviewed    T(F): 97.9 (09-28-18 @ 08:26), Max: 97.9 (09-28-18 @ 08:26)  HR: 64 (09-28-18 @ 08:26)  BP: 138/77 (09-28-18 @ 08:26)  RR: 18 (09-28-18 @ 08:26)  SpO2: 97% (09-28-18 @ 08:26)  Wt(kg): --    PHYSICAL EXAM:  General: alert, no acute distress, cushingoid  Eyes:  anicteric, no conjunctival injection, no discharge  Oropharynx: no lesions or injection 	  Neck: supple, without adenopathy  Lungs: clear to auscultation  Heart: regular rate and rhythm; no murmur, rubs or gallops  Abdomen: soft, nondistended, nontender, without mass or organomegaly  Skin: multiple wounds dressed  Extremities: no clubbing, cyanosis, or edema  Neurologic: alert, oriented, moves all extremities  RT arm PICC line  LAB RESULTS:                        7.8    9.3   )-----------( 123      ( 28 Sep 2018 09:16 )             24.1     09-28    141  |  107  |  34<H>  ----------------------------<  111<H>  5.2   |  19<L>  |  0.86    Ca    8.5      28 Sep 2018 09:03  Phos  3.8     09-27  Mg     1.8     09-27          MICROBIOLOGY:  RECENT CULTURES:  09-26 @ 21:00 .Blood Blood     Growth in anaerobic bottle:  Gram Positive Cocci in Clusters  Growth in aerobic bottle:  Gram Positive Cocci in Clusters    Growth in anaerobic bottle:  Gram Positive Cocci in Clusters  Growth in aerobic bottle:  Gram Positive Cocci in Clusters    09-25 @ 14:20 .Blood Blood-Peripheral Coag Negative Staphylococcus    Growth in aerobic and anaerobic bottles: Staphylococcus epidermidis  Susceptibility to follow.    Growth in anaerobic bottle: Gram Positive Cocci in Clusters  Growth in aerobic bottle: Gram Positive Cocci in Clusters    09-25 @ 14:07 .Urine Clean Catch (Midstream)     No growth          RADIOLOGY REVIEWED:    < from: CT Angio Chest w/ IV Cont (09.25.18 @ 13:21) >  IMPRESSION:     No pulmonary embolus.    New small-moderate pleural effusions with adjacent compressive   atelectasis. Underlying infection cannot be ruled out. Recommend clinical     < end of copied text >  < from: Xray Knee 1 or 2 Views, Right (09.26.18 @ 18:42) >  COMPARISON: X-ray right knee 8/5/2018.    FINDINGS:  Interval removal of wound vac.    Redemonstration of right knee arthrodesis with intramedullary ruthann and   cement fixation. No periprosthetic lucencies or fracture. No joint   effusion.    Vascular calcifications.    IMPRESSION:  Unchanged orthopedic hardware.    < from: US TTE 2D F/U, Limited w/o Contrast (ED) (09.25.18 @ 16:41) >  PROCEDURE DATE:  09/25/2018    FOCUSED ED ULTRASOUND REPORT          INTERPRETATION:  A focused transthoracic cardiac ultrasound examination   was performed.   No significant pericardial effusion was present.  No global wall motion abnormality was identified/  A left pleural effusion was seen  No anterior B lines visualized.    IMPRESSION:   No significant Pericardial Effusion.

## 2018-09-29 LAB
ANION GAP SERPL CALC-SCNC: 12 MMOL/L — SIGNIFICANT CHANGE UP (ref 5–17)
APTT BLD: 39 SEC — HIGH (ref 27.5–37.4)
BUN SERPL-MCNC: 28 MG/DL — HIGH (ref 7–23)
CALCIUM SERPL-MCNC: 8.1 MG/DL — LOW (ref 8.4–10.5)
CHLORIDE SERPL-SCNC: 104 MMOL/L — SIGNIFICANT CHANGE UP (ref 96–108)
CO2 SERPL-SCNC: 24 MMOL/L — SIGNIFICANT CHANGE UP (ref 22–31)
CREAT SERPL-MCNC: 0.76 MG/DL — SIGNIFICANT CHANGE UP (ref 0.5–1.3)
GLUCOSE SERPL-MCNC: 160 MG/DL — HIGH (ref 70–99)
HCT VFR BLD CALC: 22.1 % — LOW (ref 34.5–45)
HGB BLD-MCNC: 7.3 G/DL — LOW (ref 11.5–15.5)
INR BLD: 2.78 RATIO — HIGH (ref 0.88–1.16)
MCHC RBC-ENTMCNC: 29 PG — SIGNIFICANT CHANGE UP (ref 27–34)
MCHC RBC-ENTMCNC: 33 GM/DL — SIGNIFICANT CHANGE UP (ref 32–36)
MCV RBC AUTO: 87.9 FL — SIGNIFICANT CHANGE UP (ref 80–100)
PLATELET # BLD AUTO: 152 K/UL — SIGNIFICANT CHANGE UP (ref 150–400)
POTASSIUM SERPL-MCNC: 5.2 MMOL/L — SIGNIFICANT CHANGE UP (ref 3.5–5.3)
POTASSIUM SERPL-SCNC: 5.2 MMOL/L — SIGNIFICANT CHANGE UP (ref 3.5–5.3)
PROTHROM AB SERPL-ACNC: 30.7 SEC — HIGH (ref 9.8–12.7)
RBC # BLD: 2.52 M/UL — LOW (ref 3.8–5.2)
RBC # FLD: 17.8 % — HIGH (ref 10.3–14.5)
SODIUM SERPL-SCNC: 140 MMOL/L — SIGNIFICANT CHANGE UP (ref 135–145)
WBC # BLD: 7.8 K/UL — SIGNIFICANT CHANGE UP (ref 3.8–10.5)
WBC # FLD AUTO: 7.8 K/UL — SIGNIFICANT CHANGE UP (ref 3.8–10.5)

## 2018-09-29 RX ORDER — WARFARIN SODIUM 2.5 MG/1
1 TABLET ORAL ONCE
Qty: 0 | Refills: 0 | Status: COMPLETED | OUTPATIENT
Start: 2018-09-29 | End: 2018-09-29

## 2018-09-29 RX ADMIN — LORATADINE 10 MILLIGRAM(S): 10 TABLET ORAL at 13:57

## 2018-09-29 RX ADMIN — Medication 650 MILLIGRAM(S): at 13:57

## 2018-09-29 RX ADMIN — Medication 650 MILLIGRAM(S): at 05:37

## 2018-09-29 RX ADMIN — TIOTROPIUM BROMIDE 1 CAPSULE(S): 18 CAPSULE ORAL; RESPIRATORY (INHALATION) at 05:37

## 2018-09-29 RX ADMIN — Medication 1 TABLET(S): at 13:57

## 2018-09-29 RX ADMIN — Medication 1 TABLET(S): at 21:39

## 2018-09-29 RX ADMIN — Medication 12.5 MILLIGRAM(S): at 05:38

## 2018-09-29 RX ADMIN — TRAMADOL HYDROCHLORIDE 25 MILLIGRAM(S): 50 TABLET ORAL at 21:40

## 2018-09-29 RX ADMIN — Medication 250 MILLIGRAM(S): at 18:15

## 2018-09-29 RX ADMIN — SIMVASTATIN 20 MILLIGRAM(S): 20 TABLET, FILM COATED ORAL at 21:39

## 2018-09-29 RX ADMIN — TRAMADOL HYDROCHLORIDE 25 MILLIGRAM(S): 50 TABLET ORAL at 05:36

## 2018-09-29 RX ADMIN — Medication 50 MILLIGRAM(S): at 05:37

## 2018-09-29 RX ADMIN — GABAPENTIN 300 MILLIGRAM(S): 400 CAPSULE ORAL at 13:57

## 2018-09-29 RX ADMIN — TRAMADOL HYDROCHLORIDE 25 MILLIGRAM(S): 50 TABLET ORAL at 20:40

## 2018-09-29 RX ADMIN — Medication 12.5 MILLIGRAM(S): at 18:15

## 2018-09-29 RX ADMIN — CHLORHEXIDINE GLUCONATE 1 APPLICATION(S): 213 SOLUTION TOPICAL at 14:06

## 2018-09-29 RX ADMIN — PANTOPRAZOLE SODIUM 40 MILLIGRAM(S): 20 TABLET, DELAYED RELEASE ORAL at 05:37

## 2018-09-29 RX ADMIN — Medication 1 APPLICATION(S): at 18:18

## 2018-09-29 RX ADMIN — Medication 81 MILLIGRAM(S): at 13:57

## 2018-09-29 RX ADMIN — TRAMADOL HYDROCHLORIDE 25 MILLIGRAM(S): 50 TABLET ORAL at 07:08

## 2018-09-29 RX ADMIN — Medication 1 TABLET(S): at 05:37

## 2018-09-29 RX ADMIN — GABAPENTIN 300 MILLIGRAM(S): 400 CAPSULE ORAL at 21:39

## 2018-09-29 RX ADMIN — Medication 1 APPLICATION(S): at 07:08

## 2018-09-29 RX ADMIN — GABAPENTIN 300 MILLIGRAM(S): 400 CAPSULE ORAL at 05:37

## 2018-09-29 RX ADMIN — WARFARIN SODIUM 1 MILLIGRAM(S): 2.5 TABLET ORAL at 21:39

## 2018-09-29 RX ADMIN — POLYETHYLENE GLYCOL 3350 17 GRAM(S): 17 POWDER, FOR SOLUTION ORAL at 05:38

## 2018-09-29 RX ADMIN — BUDESONIDE AND FORMOTEROL FUMARATE DIHYDRATE 2 PUFF(S): 160; 4.5 AEROSOL RESPIRATORY (INHALATION) at 18:15

## 2018-09-29 RX ADMIN — Medication 3 MILLIGRAM(S): at 21:39

## 2018-09-29 RX ADMIN — BUDESONIDE AND FORMOTEROL FUMARATE DIHYDRATE 2 PUFF(S): 160; 4.5 AEROSOL RESPIRATORY (INHALATION) at 05:37

## 2018-09-29 RX ADMIN — Medication 650 MILLIGRAM(S): at 18:15

## 2018-09-29 NOTE — PROGRESS NOTE ADULT - ASSESSMENT
78yo female pmh HTN, CAD with PPM, recent admission for infected right knee hardware on minocycline by PICC at rehab center p/w multiple episodes of syncope, found to be hypotensive 60/40 by EMS, started on IVF by EMS, received 100cc prior to arrival, also hypoxic to low 90s on RA. Pt c/o dysuria and foul-smelling urine x 5 days, c/o dizziness. Denies fever, chills, chest pain, dyspnea, palpitations, dizziness, weakness, recent cough, nausea, vomiting, diarrhea, abdominal pain, BRBPR, melena, back pain, leg swelling.      septic shock coag negative staph bacteremia  - Repeat blood cultures   - iv vancomycin 1 g daily  -  picc line removed  - ERIKA ORDERED  - continue local wound care per dr ramirez  - ortho follows for knee    multiple wounds  - wound care consult following    anemia  - s/p 1 unit of prbc  - monitor cbc  - check stool for Occult blood    polymyalgia  - cw prednisone    CAD  - c/w asa    lumbar disc disease  - cw Neurontin    copd  - cw Advair and Spiriva    GERD  - cw Protonix    dvt px  - on coumadin  - hold for inr over 3  pt eval

## 2018-09-29 NOTE — PROGRESS NOTE ADULT - SUBJECTIVE AND OBJECTIVE BOX
CC: f/u for staph epi line infection    Patient reports she had a great day and feels well today    REVIEW OF SYSTEMS:  All other review of systems negative (Comprehensive ROS)    Antimicrobials Day #  :4  vancomycin  IVPB 1000 milliGRAM(s) IV Intermittent every 24 hours  vancomycin  IVPB        Other Medications Reviewed    T(F): 98 (09-29-18 @ 16:42), Max: 98.3 (09-29-18 @ 12:12)  HR: 71 (09-29-18 @ 16:42)  BP: 126/71 (09-29-18 @ 16:42)  RR: 18 (09-29-18 @ 16:42)  SpO2: 95% (09-29-18 @ 16:42)  Wt(kg): --    PHYSICAL EXAM:  General: alert, no acute distress  Eyes:  anicteric, no conjunctival injection, no discharge  Oropharynx: no lesions or injection 	  Neck: supple, without adenopathy  Lungs: clear to auscultation  Heart: regular rate and rhythm; 2/6 sys m  Abdomen: soft, nondistended, nontender, without mass or organomegaly  Skin: dressed lesions  Extremities: picc out , wounds dressed  Neurologic: alert, oriented, moves all extremities    LAB RESULTS:                        7.3    7.8   )-----------( 152      ( 29 Sep 2018 19:27 )             22.1     09-28    141  |  107  |  34<H>  ----------------------------<  111<H>  5.2   |  19<L>  |  0.86    Ca    8.5      28 Sep 2018 09:03          MICROBIOLOGY:  RECENT CULTURES:  09-26 @ 21:00 .Blood Blood     Growth in aerobic and anaerobic bottles:  Staphylococcus epidermidis    Growth in anaerobic bottle:  Gram Positive Cocci in Clusters  Growth in aerobic bottle:  Gram Positive Cocci in Clusters    09-25 @ 14:20 .Blood Blood-Peripheral Coag Negative Staphylococcus    Growth in aerobic and anaerobic bottles: Staphylococcus epidermidis  Susceptibility to follow.    Growth in anaerobic bottle: Gram Positive Cocci in Clusters  Growth in aerobic bottle: Gram Positive Cocci in Clusters    09-25 @ 14:07 .Urine Clean Catch (Midstream)     No growth          RADIOLOGY REVIEWED:      < from: CT Angio Chest w/ IV Cont (09.25.18 @ 13:21) >  IMPRESSION:     No pulmonary embolus.    New small-moderate pleural effusions with adjacent compressive   atelectasis. Underlying infection cannot be ruled out. Recommend clinical   correlation for pneumonia.    < end of copied text >          Assessment:  Patient returns to hospital with staph epi picc line associated bacteremia now s/p line pull. follow up blood cultures pend. Has tavr raising concern for endocarditis  Plan: continue iv vanco  follow up latest blood cultures  favor zoe to help guide duration of antibiotics  continue local wound care

## 2018-09-29 NOTE — PROGRESS NOTE ADULT - SUBJECTIVE AND OBJECTIVE BOX
Patient is a 77y old  Female who presents with a chief complaint of dysuria, hypotension (28 Sep 2018 12:22)      SUBJECTIVE / OVERNIGHT EVENTS:  No chest pain. No shortness of breath. No complaints. No events overnight.     MEDICATIONS  (STANDING):  acetaminophen   Tablet .. 650 milliGRAM(s) Oral every 6 hours  aspirin enteric coated 81 milliGRAM(s) Oral daily  buDESOnide 160 MICROgram(s)/formoterol 4.5 MICROgram(s) Inhaler 2 Puff(s) Inhalation two times a day  calcium carbonate 1250 mG  + Vitamin D (OsCal 500 + D) 1 Tablet(s) Oral three times a day  chlorhexidine 4% Liquid 1 Application(s) Topical <User Schedule>  Dakins Solution - 1/4 Strength 1 Application(s) Topical two times a day  gabapentin 300 milliGRAM(s) Oral three times a day  loratadine 10 milliGRAM(s) Oral daily  melatonin 3 milliGRAM(s) Oral at bedtime  metoprolol tartrate 12.5 milliGRAM(s) Oral two times a day  pantoprazole    Tablet 40 milliGRAM(s) Oral before breakfast  polyethylene glycol 3350 17 Gram(s) Oral two times a day  predniSONE   Tablet 50 milliGRAM(s) Oral daily  simvastatin 20 milliGRAM(s) Oral at bedtime  tiotropium 18 MICROgram(s) Capsule 1 Capsule(s) Inhalation daily  vancomycin  IVPB 1000 milliGRAM(s) IV Intermittent every 24 hours  vancomycin  IVPB        MEDICATIONS  (PRN):  senna 2 Tablet(s) Oral at bedtime PRN Constipation  simethicone 80 milliGRAM(s) Chew two times a day PRN Gas  traMADol 25 milliGRAM(s) Oral three times a day PRN Moderate Pain (4 - 6)      Vital Signs Last 24 Hrs  T(C): 36.6 (29 Sep 2018 08:05), Max: 36.8 (28 Sep 2018 12:51)  T(F): 97.8 (29 Sep 2018 08:05), Max: 98.3 (28 Sep 2018 12:51)  HR: 61 (29 Sep 2018 08:05) (61 - 68)  BP: 124/71 (29 Sep 2018 08:05) (124/71 - 148/78)  BP(mean): --  RR: 18 (29 Sep 2018 08:05) (18 - 18)  SpO2: 95% (29 Sep 2018 08:05) (94% - 96%)  CAPILLARY BLOOD GLUCOSE        I&O's Summary    28 Sep 2018 07:01  -  29 Sep 2018 07:00  --------------------------------------------------------  IN: 200 mL / OUT: 1450 mL / NET: -1250 mL        PHYSICAL EXAM:  GENERAL: NAD, well-developed  HEAD:  Atraumatic, Normocephalic  EYES: EOMI, PERRLA, conjunctiva and sclera clear  NECK: Supple, No JVD  CHEST/LUNG: Clear to auscultation bilaterally; No wheeze  HEART: Regular rate and rhythm; No murmurs, rubs, or gallops  ABDOMEN: Soft, Nontender, Nondistended; Bowel sounds present  EXTREMITIES:  2+ Peripheral Pulses, No clubbing, cyanosis, or edema  PSYCH: AAOx3  NEUROLOGY: non-focal  SKIN: multiple skin leisions dressing c/d/i    LABS:                        7.8    9.3   )-----------( 123      ( 28 Sep 2018 09:16 )             24.1     09-28    141  |  107  |  34<H>  ----------------------------<  111<H>  5.2   |  19<L>  |  0.86    Ca    8.5      28 Sep 2018 09:03      PT/INR - ( 28 Sep 2018 09:17 )   PT: 36.3 sec;   INR: 3.25 ratio                   RADIOLOGY & ADDITIONAL TESTS:    Imaging Personally Reviewed:    Consultant(s) Notes Reviewed:      Care Discussed with Consultants/Other Providers:

## 2018-09-30 LAB
ANION GAP SERPL CALC-SCNC: 7 MMOL/L — SIGNIFICANT CHANGE UP (ref 5–17)
BUN SERPL-MCNC: 27 MG/DL — HIGH (ref 7–23)
CALCIUM SERPL-MCNC: 8.9 MG/DL — SIGNIFICANT CHANGE UP (ref 8.4–10.5)
CHLORIDE SERPL-SCNC: 103 MMOL/L — SIGNIFICANT CHANGE UP (ref 96–108)
CO2 SERPL-SCNC: 30 MMOL/L — SIGNIFICANT CHANGE UP (ref 22–31)
CREAT SERPL-MCNC: 0.63 MG/DL — SIGNIFICANT CHANGE UP (ref 0.5–1.3)
GLUCOSE SERPL-MCNC: 115 MG/DL — HIGH (ref 70–99)
HCT VFR BLD CALC: 23.3 % — LOW (ref 34.5–45)
HGB BLD-MCNC: 7.4 G/DL — LOW (ref 11.5–15.5)
INR BLD: 2.41 RATIO — HIGH (ref 0.88–1.16)
MAGNESIUM SERPL-MCNC: 1.9 MG/DL — SIGNIFICANT CHANGE UP (ref 1.6–2.6)
MCHC RBC-ENTMCNC: 28.8 PG — SIGNIFICANT CHANGE UP (ref 27–34)
MCHC RBC-ENTMCNC: 31.8 GM/DL — LOW (ref 32–36)
MCV RBC AUTO: 90.7 FL — SIGNIFICANT CHANGE UP (ref 80–100)
NRBC # BLD: 2 /100 WBCS — HIGH (ref 0–0)
PHOSPHATE SERPL-MCNC: 2.7 MG/DL — SIGNIFICANT CHANGE UP (ref 2.5–4.5)
PLATELET # BLD AUTO: 150 K/UL — SIGNIFICANT CHANGE UP (ref 150–400)
POTASSIUM SERPL-MCNC: 4.9 MMOL/L — SIGNIFICANT CHANGE UP (ref 3.5–5.3)
POTASSIUM SERPL-SCNC: 4.9 MMOL/L — SIGNIFICANT CHANGE UP (ref 3.5–5.3)
PROTHROM AB SERPL-ACNC: 27.7 SEC — HIGH (ref 10–13.1)
RBC # BLD: 2.57 M/UL — LOW (ref 3.8–5.2)
RBC # FLD: 19.6 % — HIGH (ref 10.3–14.5)
SODIUM SERPL-SCNC: 140 MMOL/L — SIGNIFICANT CHANGE UP (ref 135–145)
WBC # BLD: 7.28 K/UL — SIGNIFICANT CHANGE UP (ref 3.8–10.5)
WBC # FLD AUTO: 7.28 K/UL — SIGNIFICANT CHANGE UP (ref 3.8–10.5)

## 2018-09-30 RX ORDER — WARFARIN SODIUM 2.5 MG/1
1 TABLET ORAL ONCE
Qty: 0 | Refills: 0 | Status: COMPLETED | OUTPATIENT
Start: 2018-09-30 | End: 2018-09-30

## 2018-09-30 RX ORDER — LANOLIN ALCOHOL/MO/W.PET/CERES
2 CREAM (GRAM) TOPICAL ONCE
Qty: 0 | Refills: 0 | Status: DISCONTINUED | OUTPATIENT
Start: 2018-09-30 | End: 2018-09-30

## 2018-09-30 RX ADMIN — TRAMADOL HYDROCHLORIDE 25 MILLIGRAM(S): 50 TABLET ORAL at 09:15

## 2018-09-30 RX ADMIN — Medication 250 MILLIGRAM(S): at 18:53

## 2018-09-30 RX ADMIN — CHLORHEXIDINE GLUCONATE 1 APPLICATION(S): 213 SOLUTION TOPICAL at 12:45

## 2018-09-30 RX ADMIN — Medication 1 TABLET(S): at 14:43

## 2018-09-30 RX ADMIN — LORATADINE 10 MILLIGRAM(S): 10 TABLET ORAL at 12:42

## 2018-09-30 RX ADMIN — Medication 1 TABLET(S): at 05:49

## 2018-09-30 RX ADMIN — BUDESONIDE AND FORMOTEROL FUMARATE DIHYDRATE 2 PUFF(S): 160; 4.5 AEROSOL RESPIRATORY (INHALATION) at 05:49

## 2018-09-30 RX ADMIN — Medication 12.5 MILLIGRAM(S): at 05:49

## 2018-09-30 RX ADMIN — Medication 650 MILLIGRAM(S): at 12:39

## 2018-09-30 RX ADMIN — Medication 650 MILLIGRAM(S): at 18:05

## 2018-09-30 RX ADMIN — Medication 81 MILLIGRAM(S): at 12:42

## 2018-09-30 RX ADMIN — PANTOPRAZOLE SODIUM 40 MILLIGRAM(S): 20 TABLET, DELAYED RELEASE ORAL at 05:50

## 2018-09-30 RX ADMIN — TIOTROPIUM BROMIDE 1 CAPSULE(S): 18 CAPSULE ORAL; RESPIRATORY (INHALATION) at 05:51

## 2018-09-30 RX ADMIN — Medication 50 MILLIGRAM(S): at 05:50

## 2018-09-30 RX ADMIN — Medication 650 MILLIGRAM(S): at 05:50

## 2018-09-30 RX ADMIN — TRAMADOL HYDROCHLORIDE 25 MILLIGRAM(S): 50 TABLET ORAL at 10:15

## 2018-09-30 RX ADMIN — GABAPENTIN 300 MILLIGRAM(S): 400 CAPSULE ORAL at 20:54

## 2018-09-30 RX ADMIN — TRAMADOL HYDROCHLORIDE 25 MILLIGRAM(S): 50 TABLET ORAL at 22:54

## 2018-09-30 RX ADMIN — BUDESONIDE AND FORMOTEROL FUMARATE DIHYDRATE 2 PUFF(S): 160; 4.5 AEROSOL RESPIRATORY (INHALATION) at 18:06

## 2018-09-30 RX ADMIN — Medication 1 APPLICATION(S): at 18:09

## 2018-09-30 RX ADMIN — Medication 1 APPLICATION(S): at 07:16

## 2018-09-30 RX ADMIN — POLYETHYLENE GLYCOL 3350 17 GRAM(S): 17 POWDER, FOR SOLUTION ORAL at 20:54

## 2018-09-30 RX ADMIN — Medication 3 MILLIGRAM(S): at 20:53

## 2018-09-30 RX ADMIN — WARFARIN SODIUM 1 MILLIGRAM(S): 2.5 TABLET ORAL at 20:53

## 2018-09-30 RX ADMIN — GABAPENTIN 300 MILLIGRAM(S): 400 CAPSULE ORAL at 14:43

## 2018-09-30 RX ADMIN — Medication 1 TABLET(S): at 20:53

## 2018-09-30 RX ADMIN — GABAPENTIN 300 MILLIGRAM(S): 400 CAPSULE ORAL at 05:50

## 2018-09-30 RX ADMIN — POLYETHYLENE GLYCOL 3350 17 GRAM(S): 17 POWDER, FOR SOLUTION ORAL at 05:51

## 2018-09-30 RX ADMIN — SIMVASTATIN 20 MILLIGRAM(S): 20 TABLET, FILM COATED ORAL at 20:54

## 2018-09-30 RX ADMIN — Medication 12.5 MILLIGRAM(S): at 18:05

## 2018-09-30 NOTE — PROGRESS NOTE ADULT - SUBJECTIVE AND OBJECTIVE BOX
CC: f/u for  staph epi bacteremia  Patient reports she feels ok, sores a little tender    REVIEW OF SYSTEMS:  All other review of systems negative (Comprehensive ROS)    Antimicrobials Day #  :5  vancomycin  IVPB 1000 milliGRAM(s) IV Intermittent every 24 hours  vancomycin  IVPB        Other Medications Reviewed    T(F): 98.2 (09-30-18 @ 13:50), Max: 98.3 (09-29-18 @ 22:21)  HR: 76 (09-30-18 @ 13:50)  BP: 137/80 (09-30-18 @ 13:50)  RR: 18 (09-30-18 @ 13:50)  SpO2: 99% (09-30-18 @ 13:50)  Wt(kg): --    PHYSICAL EXAM:  General: alert, no acute distress  Eyes:  anicteric, no conjunctival injection, no discharge  Oropharynx: no lesions or injection 	  Neck: supple, without adenopathy  Lungs: clear to auscultation  Heart: regular rate and rhythm; s1s2 2/6 sys m  Abdomen: soft, nondistended, nontender, without mass or organomegaly. right side of abdomen with ecchymosis and weeping clean wound  Skin: no lesions  Extremities: no clubbing, cyanosis,. edema thighs, wounds dressed  Neurologic: alert, oriented, moves all extremities    LAB RESULTS:                        7.4    7.28  )-----------( 150      ( 30 Sep 2018 08:11 )             23.3     09-30    140  |  103  |  27<H>  ----------------------------<  115<H>  4.9   |  30  |  0.63    Ca    8.9      30 Sep 2018 07:17  Phos  2.7     09-30  Mg     1.9     09-30          MICROBIOLOGY:  RECENT CULTURES:  09-26 @ 21:00 .Blood Blood     Growth in aerobic and anaerobic bottles:  Staphylococcus epidermidis    Growth in anaerobic bottle:  Gram Positive Cocci in Clusters  Growth in aerobic bottle:  Gram Positive Cocci in Clusters        RADIOLOGY REVIEWED:  < from: CT Angio Chest w/ IV Cont (09.25.18 @ 13:21) >    IMPRESSION:     No pulmonary embolus.    New small-moderate pleural effusions with adjacent compressive   atelectasis. Underlying infection cannot be ruled out. Recommend clinical   correlation for pneumonia.      < end of copied text >    Assessment:  patient with prosthetic knee infection s/p rudy, spacer and spacer change for poor wound healing and mrsa infection , prolonged post op course with appendicitis, poor wound healing again and neutrophilic dermatosis. She went to rehab and returns with staph epi picc line associated bacteremia s/p line removal. She has tavr so for zoe  Plan:  continue iv vanco  await zoe  await repeat blood culture results

## 2018-09-30 NOTE — PROGRESS NOTE ADULT - SUBJECTIVE AND OBJECTIVE BOX
Patient is a 77y old  Female who presents with a chief complaint of dysuria (29 Sep 2018 19:56)      SUBJECTIVE / OVERNIGHT EVENTS:  No chest pain. No shortness of breath. No complaints. No events overnight.     MEDICATIONS  (STANDING):  acetaminophen   Tablet .. 650 milliGRAM(s) Oral every 6 hours  aspirin enteric coated 81 milliGRAM(s) Oral daily  buDESOnide 160 MICROgram(s)/formoterol 4.5 MICROgram(s) Inhaler 2 Puff(s) Inhalation two times a day  calcium carbonate 1250 mG  + Vitamin D (OsCal 500 + D) 1 Tablet(s) Oral three times a day  chlorhexidine 4% Liquid 1 Application(s) Topical <User Schedule>  Dakins Solution - 1/4 Strength 1 Application(s) Topical two times a day  gabapentin 300 milliGRAM(s) Oral three times a day  loratadine 10 milliGRAM(s) Oral daily  melatonin 3 milliGRAM(s) Oral at bedtime  metoprolol tartrate 12.5 milliGRAM(s) Oral two times a day  pantoprazole    Tablet 40 milliGRAM(s) Oral before breakfast  polyethylene glycol 3350 17 Gram(s) Oral two times a day  predniSONE   Tablet 50 milliGRAM(s) Oral daily  simvastatin 20 milliGRAM(s) Oral at bedtime  tiotropium 18 MICROgram(s) Capsule 1 Capsule(s) Inhalation daily  vancomycin  IVPB 1000 milliGRAM(s) IV Intermittent every 24 hours  vancomycin  IVPB        MEDICATIONS  (PRN):  senna 2 Tablet(s) Oral at bedtime PRN Constipation  simethicone 80 milliGRAM(s) Chew two times a day PRN Gas  traMADol 25 milliGRAM(s) Oral three times a day PRN Moderate Pain (4 - 6)      Vital Signs Last 24 Hrs  T(C): 36.6 (30 Sep 2018 05:47), Max: 36.8 (29 Sep 2018 12:12)  T(F): 97.9 (30 Sep 2018 05:47), Max: 98.3 (29 Sep 2018 12:12)  HR: 67 (30 Sep 2018 05:47) (61 - 74)  BP: 142/80 (30 Sep 2018 05:47) (121/70 - 142/80)  BP(mean): --  RR: 18 (30 Sep 2018 05:47) (18 - 18)  SpO2: 96% (30 Sep 2018 05:47) (94% - 96%)  CAPILLARY BLOOD GLUCOSE        I&O's Summary    29 Sep 2018 07:01  -  30 Sep 2018 07:00  --------------------------------------------------------  IN: 740 mL / OUT: 1050 mL / NET: -310 mL        PHYSICAL EXAM:  GENERAL: NAD, well-developed  HEAD:  Atraumatic, Normocephalic  EYES: EOMI, PERRLA, conjunctiva and sclera clear  NECK: Supple, No JVD  CHEST/LUNG: Clear to auscultation bilaterally; No wheeze  HEART: Regular rate and rhythm; No murmurs, rubs, or gallops  ABDOMEN: Soft, Nontender, Nondistended; Bowel sounds present  EXTREMITIES:  2+ Peripheral Pulses, No clubbing, cyanosis, or edema  PSYCH: AAOx3  NEUROLOGY: non-focal  SKIN: No rashes or lesions    LABS:                        7.3    7.8   )-----------( 152      ( 29 Sep 2018 19:27 )             22.1     09-29    140  |  104  |  28<H>  ----------------------------<  160<H>  5.2   |  24  |  0.76    Ca    8.1<L>      29 Sep 2018 19:27      PT/INR - ( 29 Sep 2018 19:27 )   PT: 30.7 sec;   INR: 2.78 ratio         PTT - ( 29 Sep 2018 19:27 )  PTT:39.0 sec          RADIOLOGY & ADDITIONAL TESTS:    Imaging Personally Reviewed:    Consultant(s) Notes Reviewed:      Care Discussed with Consultants/Other Providers:

## 2018-10-01 LAB
ANION GAP SERPL CALC-SCNC: 6 MMOL/L — SIGNIFICANT CHANGE UP (ref 5–17)
APTT BLD: 34.5 SEC — SIGNIFICANT CHANGE UP (ref 27.5–37.4)
BUN SERPL-MCNC: 25 MG/DL — HIGH (ref 7–23)
CALCIUM SERPL-MCNC: 8.5 MG/DL — SIGNIFICANT CHANGE UP (ref 8.4–10.5)
CHLORIDE SERPL-SCNC: 103 MMOL/L — SIGNIFICANT CHANGE UP (ref 96–108)
CO2 SERPL-SCNC: 31 MMOL/L — SIGNIFICANT CHANGE UP (ref 22–31)
CREAT SERPL-MCNC: 0.62 MG/DL — SIGNIFICANT CHANGE UP (ref 0.5–1.3)
GLUCOSE SERPL-MCNC: 106 MG/DL — HIGH (ref 70–99)
HCT VFR BLD CALC: 24 % — LOW (ref 34.5–45)
HGB BLD-MCNC: 7.4 G/DL — LOW (ref 11.5–15.5)
INR BLD: 2.39 RATIO — HIGH (ref 0.88–1.16)
MCHC RBC-ENTMCNC: 28.5 PG — SIGNIFICANT CHANGE UP (ref 27–34)
MCHC RBC-ENTMCNC: 30.8 GM/DL — LOW (ref 32–36)
MCV RBC AUTO: 92.3 FL — SIGNIFICANT CHANGE UP (ref 80–100)
NRBC # BLD: 2 /100 WBCS — HIGH (ref 0–0)
PLATELET # BLD AUTO: 174 K/UL — SIGNIFICANT CHANGE UP (ref 150–400)
POTASSIUM SERPL-MCNC: 4.7 MMOL/L — SIGNIFICANT CHANGE UP (ref 3.5–5.3)
POTASSIUM SERPL-SCNC: 4.7 MMOL/L — SIGNIFICANT CHANGE UP (ref 3.5–5.3)
PROTHROM AB SERPL-ACNC: 26.3 SEC — HIGH (ref 9.8–12.7)
RBC # BLD: 2.6 M/UL — LOW (ref 3.8–5.2)
RBC # FLD: 19.8 % — HIGH (ref 10.3–14.5)
SODIUM SERPL-SCNC: 140 MMOL/L — SIGNIFICANT CHANGE UP (ref 135–145)
WBC # BLD: 6.99 K/UL — SIGNIFICANT CHANGE UP (ref 3.8–10.5)
WBC # FLD AUTO: 6.99 K/UL — SIGNIFICANT CHANGE UP (ref 3.8–10.5)

## 2018-10-01 PROCEDURE — 93306 TTE W/DOPPLER COMPLETE: CPT | Mod: 26

## 2018-10-01 PROCEDURE — 93312 ECHO TRANSESOPHAGEAL: CPT | Mod: 26

## 2018-10-01 RX ORDER — WARFARIN SODIUM 2.5 MG/1
1 TABLET ORAL ONCE
Qty: 0 | Refills: 0 | Status: COMPLETED | OUTPATIENT
Start: 2018-10-01 | End: 2018-10-01

## 2018-10-01 RX ADMIN — Medication 650 MILLIGRAM(S): at 22:02

## 2018-10-01 RX ADMIN — LORATADINE 10 MILLIGRAM(S): 10 TABLET ORAL at 22:04

## 2018-10-01 RX ADMIN — SIMVASTATIN 20 MILLIGRAM(S): 20 TABLET, FILM COATED ORAL at 22:03

## 2018-10-01 RX ADMIN — Medication 1 TABLET(S): at 05:56

## 2018-10-01 RX ADMIN — BUDESONIDE AND FORMOTEROL FUMARATE DIHYDRATE 2 PUFF(S): 160; 4.5 AEROSOL RESPIRATORY (INHALATION) at 22:05

## 2018-10-01 RX ADMIN — Medication 1 TABLET(S): at 22:03

## 2018-10-01 RX ADMIN — Medication 12.5 MILLIGRAM(S): at 22:05

## 2018-10-01 RX ADMIN — POLYETHYLENE GLYCOL 3350 17 GRAM(S): 17 POWDER, FOR SOLUTION ORAL at 22:02

## 2018-10-01 RX ADMIN — CHLORHEXIDINE GLUCONATE 1 APPLICATION(S): 213 SOLUTION TOPICAL at 08:00

## 2018-10-01 RX ADMIN — Medication 3 MILLIGRAM(S): at 22:03

## 2018-10-01 RX ADMIN — GABAPENTIN 300 MILLIGRAM(S): 400 CAPSULE ORAL at 05:56

## 2018-10-01 RX ADMIN — Medication 650 MILLIGRAM(S): at 05:59

## 2018-10-01 RX ADMIN — GABAPENTIN 300 MILLIGRAM(S): 400 CAPSULE ORAL at 22:05

## 2018-10-01 RX ADMIN — Medication 250 MILLIGRAM(S): at 19:26

## 2018-10-01 RX ADMIN — Medication 12.5 MILLIGRAM(S): at 05:56

## 2018-10-01 RX ADMIN — Medication 50 MILLIGRAM(S): at 05:56

## 2018-10-01 RX ADMIN — WARFARIN SODIUM 1 MILLIGRAM(S): 2.5 TABLET ORAL at 22:02

## 2018-10-01 RX ADMIN — Medication 1 APPLICATION(S): at 06:00

## 2018-10-01 RX ADMIN — BUDESONIDE AND FORMOTEROL FUMARATE DIHYDRATE 2 PUFF(S): 160; 4.5 AEROSOL RESPIRATORY (INHALATION) at 05:57

## 2018-10-01 RX ADMIN — TIOTROPIUM BROMIDE 1 CAPSULE(S): 18 CAPSULE ORAL; RESPIRATORY (INHALATION) at 05:58

## 2018-10-01 RX ADMIN — TRAMADOL HYDROCHLORIDE 25 MILLIGRAM(S): 50 TABLET ORAL at 22:17

## 2018-10-01 RX ADMIN — PANTOPRAZOLE SODIUM 40 MILLIGRAM(S): 20 TABLET, DELAYED RELEASE ORAL at 05:57

## 2018-10-01 RX ADMIN — Medication 81 MILLIGRAM(S): at 22:04

## 2018-10-01 NOTE — PROGRESS NOTE ADULT - SUBJECTIVE AND OBJECTIVE BOX
CC: f/u for coag negative staph bacteremia    Patient reports: no complaints today, awaiting ERIKA    REVIEW OF SYSTEMS:  All other review of systems negative (Comprehensive ROS)    Antimicrobials Day #  :vanco day 6  vancomycin  IVPB 1000 milliGRAM(s) IV Intermittent every 24 hours  vancomycin  IVPB        Other Medications Reviewed    T(F): 98.2 (10-01-18 @ 05:51), Max: 98.3 (09-30-18 @ 10:13)  HR: 65 (10-01-18 @ 05:51)  BP: 137/77 (10-01-18 @ 05:51)  RR: 18 (10-01-18 @ 05:51)  SpO2: 95% (10-01-18 @ 05:51)  Wt(kg): --    PHYSICAL EXAM:  General: alert, no acute distress  Eyes:  anicteric, no conjunctival injection, no discharge  Oropharynx: no lesions or injection 	  Neck: supple, without adenopathy  Lungs: clear to auscultation  Heart: irregular rate and rhythm; no murmur, rubs or gallops  Abdomen: soft, nondistended, nontender, without mass or organomegaly  Skin: multiple wounds dressed, RT knee wound now superficial, no exposed hardware  Extremities: no clubbing, cyanosis, or edema  Neurologic: alert, oriented, moves all extremities    LAB RESULTS:                        7.4    7.28  )-----------( 150      ( 30 Sep 2018 08:11 )             23.3     10-01    140  |  103  |  25<H>  ----------------------------<  106<H>  4.7   |  31  |  0.62    Ca    8.5      01 Oct 2018 08:00  Phos  2.7     09-30  Mg     1.9     09-30          MICROBIOLOGY:  RECENT CULTURES:  09-29 @ 21:07 .Blood Blood-Peripheral     No growth to date.      09-26 @ 21:00 .Blood Blood     Growth in aerobic and anaerobic bottles:  Staphylococcus epidermidis    Growth in anaerobic bottle:  Gram Positive Cocci in Clusters  Growth in aerobic bottle:  Gram Positive Cocci in Clusters        RADIOLOGY REVIEWED:

## 2018-10-01 NOTE — DIETITIAN INITIAL EVALUATION ADULT. - NS AS NUTRI INTERV ED CONTENT
Priority modifications/Purpose of the nutrition education/Reviewed need to optimize po intake and protein for healing of wound, reinforced importance of vitamins and minerals and overall calorie intake.

## 2018-10-01 NOTE — PROGRESS NOTE ADULT - SUBJECTIVE AND OBJECTIVE BOX
Patient is a 77y old  Female who presents with a chief complaint of dysuria (01 Oct 2018 09:32)      SUBJECTIVE / OVERNIGHT EVENTS:  no complaints.  npo for ERIKA.    MEDICATIONS  (STANDING):  acetaminophen   Tablet .. 650 milliGRAM(s) Oral every 6 hours  aspirin enteric coated 81 milliGRAM(s) Oral daily  buDESOnide 160 MICROgram(s)/formoterol 4.5 MICROgram(s) Inhaler 2 Puff(s) Inhalation two times a day  calcium carbonate 1250 mG  + Vitamin D (OsCal 500 + D) 1 Tablet(s) Oral three times a day  chlorhexidine 4% Liquid 1 Application(s) Topical <User Schedule>  Dakins Solution - 1/4 Strength 1 Application(s) Topical two times a day  gabapentin 300 milliGRAM(s) Oral three times a day  loratadine 10 milliGRAM(s) Oral daily  melatonin 3 milliGRAM(s) Oral at bedtime  metoprolol tartrate 12.5 milliGRAM(s) Oral two times a day  pantoprazole    Tablet 40 milliGRAM(s) Oral before breakfast  polyethylene glycol 3350 17 Gram(s) Oral two times a day  predniSONE   Tablet 50 milliGRAM(s) Oral daily  simvastatin 20 milliGRAM(s) Oral at bedtime  tiotropium 18 MICROgram(s) Capsule 1 Capsule(s) Inhalation daily  vancomycin  IVPB 1000 milliGRAM(s) IV Intermittent every 24 hours  vancomycin  IVPB        MEDICATIONS  (PRN):  senna 2 Tablet(s) Oral at bedtime PRN Constipation  simethicone 80 milliGRAM(s) Chew two times a day PRN Gas  traMADol 25 milliGRAM(s) Oral three times a day PRN Moderate Pain (4 - 6)      Vital Signs Last 24 Hrs  T(C): 36.8 (01 Oct 2018 05:51), Max: 36.8 (30 Sep 2018 10:13)  T(F): 98.2 (01 Oct 2018 05:51), Max: 98.3 (30 Sep 2018 10:13)  HR: 65 (01 Oct 2018 05:51) (65 - 86)  BP: 137/77 (01 Oct 2018 05:51) (135/78 - 145/76)  BP(mean): --  RR: 18 (01 Oct 2018 05:51) (18 - 18)  SpO2: 95% (01 Oct 2018 05:51) (92% - 99%)  CAPILLARY BLOOD GLUCOSE        I&O's Summary    30 Sep 2018 07:01  -  01 Oct 2018 07:00  --------------------------------------------------------  IN: 540 mL / OUT: 2450 mL / NET: -1910 mL        PHYSICAL EXAM:  GENERAL: NAD, well-developed  HEAD:  Atraumatic, Normocephalic  EYES: EOMI, PERRLA, conjunctiva and sclera clear  NECK: Supple, No JVD  CHEST/LUNG: Clear to auscultation bilaterally; No wheeze  HEART: Regular rate and rhythm; No murmurs, rubs, or gallops  ABDOMEN: Soft, Nontender, Nondistended; Bowel sounds present  EXTREMITIES:  2+ Peripheral Pulses, No clubbing, cyanosis, or edema, wound vac right thigh  PSYCH: AAOx3  NEUROLOGY: non-focal  SKIN: ecchymosis right thigh, multiple skin leisions    LABS:                        7.4    7.28  )-----------( 150      ( 30 Sep 2018 08:11 )             23.3     10-01    140  |  103  |  25<H>  ----------------------------<  106<H>  4.7   |  31  |  0.62    Ca    8.5      01 Oct 2018 08:00  Phos  2.7     09-30  Mg     1.9     09-30      PT/INR - ( 30 Sep 2018 08:09 )   PT: 27.7 sec;   INR: 2.41 ratio         PTT - ( 29 Sep 2018 19:27 )  PTT:39.0 sec          RADIOLOGY & ADDITIONAL TESTS:    Imaging Personally Reviewed:    Consultant(s) Notes Reviewed:      Care Discussed with Consultants/Other Providers:

## 2018-10-01 NOTE — PROGRESS NOTE ADULT - ASSESSMENT
Morbid obesity.  history of PMR and neutrophilic dermatosis, managed with steroids, presently on 50 mg of prednisone daily  Failed MRSA rt prosthetic knee infection, s/p explant but still has retained hardware  AS, s/p TAVR  Now admitted with sustained coag negative staph bacteremia with indwelling PICC line, which has been removed.  Her most recent blood culture is negative  She was admitted hypotensive with "purulent " urine, only positive culture so far has been blood.  Suggest:  1.continue vancomycin, day 6  2.Await ERIKA results  3.If 9/29 blood cultures remain negative will hope to limit patient to 5-7 days of vanco post ERIKA removal if ERIKA is negative.  4.steroid taper per ?

## 2018-10-01 NOTE — DIETITIAN INITIAL EVALUATION ADULT. - ENERGY NEEDS
HT 66 inches,  pounds,  pounds, BMI 37.3.   pounds.   Dxd with septic shock, staph bacteremia, neutrophilic dermatosis, infected right knee hardware.   Skin wound to right lateral thigh with vac.  NPFE not completed at this time due to patients comfort.  Appears overweight and puffy. Fluids per team.  Noted with elevated glucose on steroids, K- 4.7

## 2018-10-01 NOTE — DIETITIAN INITIAL EVALUATION ADULT. - OTHER INFO
Patient seen for routine LOS assessment.  Patient found laying in bed, NPO for ERIKA, admits to not eating as well as usual.  Patient reports that she is feeling more puffy, swollen in her face from steroids and arms appear puffy.  Possible weight gain 10 pounds noted from fluids, steroids.  patient not firm on weight history.  Notes that she has some issues eating solid foods at times and she may throw up but not all the time.  Likes regular diet and staff assists her with cutting up certain foods to facilitate chewing and swallowing.  Patient did not discuss what she usually eats but reports that she is aware of need to eat more protein and loves cheese.  Noted in records that she was supposed to take Vit C, MVI and Folic acid but admits that she did not always take.  Reinforced need for nutrients for wound healing of right lateral thigh.  Denies GI complaints.  was accepting Nepro and understood need for healing.

## 2018-10-01 NOTE — PROGRESS NOTE ADULT - ASSESSMENT
78yo female pmh HTN, CAD with PPM, recent admission for infected right knee hardware on minocycline by PICC at rehab center p/w multiple episodes of syncope, found to be hypotensive 60/40 by EMS, started on IVF by EMS, received 100cc prior to arrival, also hypoxic to low 90s on RA. Pt c/o dysuria and foul-smelling urine x 5 days, c/o dizziness. Denies fever, chills, chest pain, dyspnea, palpitations, dizziness, weakness, recent cough, nausea, vomiting, diarrhea, abdominal pain, BRBPR, melena, back pain, leg swelling.      septic shock coag negative staph bacteremia  - Repeat blood cultures NGTD  - iv vancomycin 1 g daily  -  picc line removed  - ERIKA toda  - continue local wound care with wound vac per dr ramirez  - ortho follows for knee    multiple wounds  - wound care consult following    anemia  - s/p 1 unit of prbc  - monitor cbc    poss pyoderma  - cw prednisone  - will get derm involved for poss taper of steroids    CAD  - c/w asa    lumbar disc disease  - cw Neurontin    copd  - cw Advair and Spiriva    GERD  - cw Protonix    dvt px  - on coumadin  - hold for inr over 3  pt eval

## 2018-10-02 LAB
ANION GAP SERPL CALC-SCNC: 11 MMOL/L — SIGNIFICANT CHANGE UP (ref 5–17)
APTT BLD: 32.5 SEC — SIGNIFICANT CHANGE UP (ref 27.5–37.4)
BLD GP AB SCN SERPL QL: POSITIVE — SIGNIFICANT CHANGE UP
BUN SERPL-MCNC: 22 MG/DL — SIGNIFICANT CHANGE UP (ref 7–23)
CALCIUM SERPL-MCNC: 8.5 MG/DL — SIGNIFICANT CHANGE UP (ref 8.4–10.5)
CHLORIDE SERPL-SCNC: 100 MMOL/L — SIGNIFICANT CHANGE UP (ref 96–108)
CO2 SERPL-SCNC: 26 MMOL/L — SIGNIFICANT CHANGE UP (ref 22–31)
CREAT SERPL-MCNC: 0.63 MG/DL — SIGNIFICANT CHANGE UP (ref 0.5–1.3)
GLUCOSE SERPL-MCNC: 106 MG/DL — HIGH (ref 70–99)
HCT VFR BLD CALC: 24.7 % — LOW (ref 34.5–45)
HGB BLD-MCNC: 7.5 G/DL — LOW (ref 11.5–15.5)
INR BLD: 1.99 RATIO — HIGH (ref 0.88–1.16)
MCHC RBC-ENTMCNC: 27.6 PG — SIGNIFICANT CHANGE UP (ref 27–34)
MCHC RBC-ENTMCNC: 30.4 GM/DL — LOW (ref 32–36)
MCV RBC AUTO: 90.8 FL — SIGNIFICANT CHANGE UP (ref 80–100)
NRBC # BLD: 2 /100 WBCS — HIGH (ref 0–0)
PLATELET # BLD AUTO: 214 K/UL — SIGNIFICANT CHANGE UP (ref 150–400)
POTASSIUM SERPL-MCNC: 4.9 MMOL/L — SIGNIFICANT CHANGE UP (ref 3.5–5.3)
POTASSIUM SERPL-SCNC: 4.9 MMOL/L — SIGNIFICANT CHANGE UP (ref 3.5–5.3)
PROTHROM AB SERPL-ACNC: 22.8 SEC — HIGH (ref 10–13.1)
RBC # BLD: 2.72 M/UL — LOW (ref 3.8–5.2)
RBC # FLD: 19.3 % — HIGH (ref 10.3–14.5)
RH IG SCN BLD-IMP: POSITIVE — SIGNIFICANT CHANGE UP
SODIUM SERPL-SCNC: 137 MMOL/L — SIGNIFICANT CHANGE UP (ref 135–145)
VANCOMYCIN TROUGH SERPL-MCNC: 10.7 UG/ML — SIGNIFICANT CHANGE UP (ref 10–20)
WBC # BLD: 7.91 K/UL — SIGNIFICANT CHANGE UP (ref 3.8–10.5)
WBC # FLD AUTO: 7.91 K/UL — SIGNIFICANT CHANGE UP (ref 3.8–10.5)

## 2018-10-02 PROCEDURE — 99232 SBSQ HOSP IP/OBS MODERATE 35: CPT

## 2018-10-02 RX ORDER — WARFARIN SODIUM 2.5 MG/1
2 TABLET ORAL ONCE
Qty: 0 | Refills: 0 | Status: COMPLETED | OUTPATIENT
Start: 2018-10-02 | End: 2018-10-02

## 2018-10-02 RX ORDER — WARFARIN SODIUM 2.5 MG/1
2 TABLET ORAL ONCE
Qty: 0 | Refills: 0 | Status: DISCONTINUED | OUTPATIENT
Start: 2018-10-02 | End: 2018-10-02

## 2018-10-02 RX ADMIN — TRAMADOL HYDROCHLORIDE 25 MILLIGRAM(S): 50 TABLET ORAL at 14:44

## 2018-10-02 RX ADMIN — TRAMADOL HYDROCHLORIDE 25 MILLIGRAM(S): 50 TABLET ORAL at 05:32

## 2018-10-02 RX ADMIN — Medication 12.5 MILLIGRAM(S): at 18:26

## 2018-10-02 RX ADMIN — Medication 1 APPLICATION(S): at 18:27

## 2018-10-02 RX ADMIN — Medication 650 MILLIGRAM(S): at 05:33

## 2018-10-02 RX ADMIN — TIOTROPIUM BROMIDE 1 CAPSULE(S): 18 CAPSULE ORAL; RESPIRATORY (INHALATION) at 05:34

## 2018-10-02 RX ADMIN — TRAMADOL HYDROCHLORIDE 25 MILLIGRAM(S): 50 TABLET ORAL at 13:44

## 2018-10-02 RX ADMIN — PANTOPRAZOLE SODIUM 40 MILLIGRAM(S): 20 TABLET, DELAYED RELEASE ORAL at 05:33

## 2018-10-02 RX ADMIN — Medication 1 TABLET(S): at 21:19

## 2018-10-02 RX ADMIN — BUDESONIDE AND FORMOTEROL FUMARATE DIHYDRATE 2 PUFF(S): 160; 4.5 AEROSOL RESPIRATORY (INHALATION) at 18:26

## 2018-10-02 RX ADMIN — BUDESONIDE AND FORMOTEROL FUMARATE DIHYDRATE 2 PUFF(S): 160; 4.5 AEROSOL RESPIRATORY (INHALATION) at 05:34

## 2018-10-02 RX ADMIN — Medication 1 TABLET(S): at 05:33

## 2018-10-02 RX ADMIN — Medication 12.5 MILLIGRAM(S): at 05:32

## 2018-10-02 RX ADMIN — Medication 50 MILLIGRAM(S): at 05:33

## 2018-10-02 RX ADMIN — GABAPENTIN 300 MILLIGRAM(S): 400 CAPSULE ORAL at 13:44

## 2018-10-02 RX ADMIN — Medication 3 MILLIGRAM(S): at 21:19

## 2018-10-02 RX ADMIN — GABAPENTIN 300 MILLIGRAM(S): 400 CAPSULE ORAL at 21:19

## 2018-10-02 RX ADMIN — SIMVASTATIN 20 MILLIGRAM(S): 20 TABLET, FILM COATED ORAL at 21:19

## 2018-10-02 RX ADMIN — LORATADINE 10 MILLIGRAM(S): 10 TABLET ORAL at 12:43

## 2018-10-02 RX ADMIN — Medication 250 MILLIGRAM(S): at 18:25

## 2018-10-02 RX ADMIN — GABAPENTIN 300 MILLIGRAM(S): 400 CAPSULE ORAL at 05:33

## 2018-10-02 RX ADMIN — Medication 650 MILLIGRAM(S): at 12:43

## 2018-10-02 RX ADMIN — Medication 81 MILLIGRAM(S): at 12:43

## 2018-10-02 RX ADMIN — WARFARIN SODIUM 2 MILLIGRAM(S): 2.5 TABLET ORAL at 21:19

## 2018-10-02 RX ADMIN — Medication 650 MILLIGRAM(S): at 18:26

## 2018-10-02 RX ADMIN — Medication 1 TABLET(S): at 13:44

## 2018-10-02 RX ADMIN — POLYETHYLENE GLYCOL 3350 17 GRAM(S): 17 POWDER, FOR SOLUTION ORAL at 18:26

## 2018-10-02 NOTE — CONSULT NOTE ADULT - SUBJECTIVE AND OBJECTIVE BOX
HPI: 78 yo F with PMH HTN, CAD with PPM, recent admission for infected right knee hardware on minocycline by PICC at rehab center p/w multiple episodes of syncope, found to be hypotensive 60/40 by EMS, started on IVF by EMS, admitted for concern for sepsis.  Dermatology re-consulted on patient to determine if prednisone 50mg can be tapered which patient has been receiving for ulcerations of unclear etiology, possibly pyoderma gangrenosum.  Patient denies any pain of ulcerations and states they "are healing up well."       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  Artificial cardiac pacemaker: 12/25/17  H/O cardiac catheterization: 11/29/16 HERBERT placed on LAD  H/O endoscopy: with dilatation of esophageal stricture 2013  S/P cataract surgery: x2; 3-4yr ago  S/P gastroplasty: 28 yrs ago  S/P cholecystectomy: more than 15 years ago  S/P total knee replacement: left, 15yr ago  S/P total knee replacement: right, 12yr ago  S/P hysterectomy: 24yr ago      REVIEW OF SYSTEMS      General: no fevers/chills, no lethargy	    Skin/Breast: see HPI  	  Ophthalmologic: no eye pain or change in vision  	  ENMT: no dysphagia or change in hearing    Respiratory and Thorax: no SOB or cough  	  Cardiovascular: no palpitations or chest pain    Gastrointestinal: no abdominal pain or blood in stool     Genitourinary: no dysuria or frequency    Musculoskeletal: no joint pains or weakness	    Neurological: no weakness, numbness, or tingling    MEDICATIONS  (STANDING):  acetaminophen   Tablet .. 650 milliGRAM(s) Oral every 6 hours  aspirin enteric coated 81 milliGRAM(s) Oral daily  buDESOnide 160 MICROgram(s)/formoterol 4.5 MICROgram(s) Inhaler 2 Puff(s) Inhalation two times a day  calcium carbonate 1250 mG  + Vitamin D (OsCal 500 + D) 1 Tablet(s) Oral three times a day  chlorhexidine 4% Liquid 1 Application(s) Topical <User Schedule>  Dakins Solution - 1/4 Strength 1 Application(s) Topical two times a day  gabapentin 300 milliGRAM(s) Oral three times a day  loratadine 10 milliGRAM(s) Oral daily  melatonin 3 milliGRAM(s) Oral at bedtime  metoprolol tartrate 12.5 milliGRAM(s) Oral two times a day  pantoprazole    Tablet 40 milliGRAM(s) Oral before breakfast  polyethylene glycol 3350 17 Gram(s) Oral two times a day  predniSONE   Tablet 50 milliGRAM(s) Oral daily  simvastatin 20 milliGRAM(s) Oral at bedtime  tiotropium 18 MICROgram(s) Capsule 1 Capsule(s) Inhalation daily  vancomycin  IVPB 1000 milliGRAM(s) IV Intermittent every 24 hours  vancomycin  IVPB      warfarin 2 milliGRAM(s) Oral once    MEDICATIONS  (PRN):  senna 2 Tablet(s) Oral at bedtime PRN Constipation  simethicone 80 milliGRAM(s) Chew two times a day PRN Gas  traMADol 25 milliGRAM(s) Oral three times a day PRN Moderate Pain (4 - 6)      Allergies    amoxicillin (Other)    Intolerances    IV Contrast (Flushing (Skin); Nausea)      SOCIAL HISTORY:    FAMILY HISTORY:  No pertinent family history in first degree relatives      Vital Signs Last 24 Hrs  T(C): 36.6 (02 Oct 2018 07:55), Max: 36.8 (01 Oct 2018 22:08)  T(F): 97.9 (02 Oct 2018 07:55), Max: 98.2 (01 Oct 2018 22:08)  HR: 63 (02 Oct 2018 07:55) (63 - 67)  BP: 133/78 (02 Oct 2018 07:55) (128/84 - 133/78)  BP(mean): --  RR: 18 (02 Oct 2018 07:55) (16 - 18)  SpO2: 99% (02 Oct 2018 07:55) (99% - 99%)    PHYSICAL EXAM:     The patient was alert and oriented X 3, well nourished, and in no apparent distress.  OP showed no ulcerations  There was no visible lymphadenopathy.  Conjunctiva were non injected  There was no clubbing or edema of extremities.  The scalp, hair, face, eyebrows, lips, OP, neck, chest, back, extremities X 4, nails were examined.  There was no hyperhidrosis or bromhidrosis.    Of note on skin exam: ulcerations on the abdomen, upper thighs, healing well with granulation tissue       LABS:                        7.5    7.91  )-----------( 214      ( 02 Oct 2018 07:55 )             24.7     10-02    137  |  100  |  22  ----------------------------<  106<H>  4.9   |  26  |  0.63    Ca    8.5      02 Oct 2018 07:13      PT/INR - ( 02 Oct 2018 07:53 )   PT: 22.8 sec;   INR: 1.99 ratio         PTT - ( 02 Oct 2018 07:53 )  PTT:32.5 sec      RADIOLOGY & ADDITIONAL STUDIES:    Trena Das MD (PGY-3)   Dermatology Resident  [facility]  Pager: [phone]  Office [phone] HPI: 78 yo F with PMH HTN, CAD with PPM, recent admission for infected right knee hardware on minocycline by PICC at rehab center p/w multiple episodes of syncope, found to be hypotensive 60/40 by EMS, started on IVF by EMS, admitted for septic shock due to coagulase negative staph bacteremia.  Dermatology re-consulted on patient to determine if prednisone 50mg can be tapered which patient has been receiving for ulcerations of unclear etiology, possibly pyoderma gangrenosum.  Patient denies any pain of ulcerations and states they "are healing up well."       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  Artificial cardiac pacemaker: 12/25/17  H/O cardiac catheterization: 11/29/16 HERBERT placed on LAD  H/O endoscopy: with dilatation of esophageal stricture 2013  S/P cataract surgery: x2; 3-4yr ago  S/P gastroplasty: 28 yrs ago  S/P cholecystectomy: more than 15 years ago  S/P total knee replacement: left, 15yr ago  S/P total knee replacement: right, 12yr ago  S/P hysterectomy: 24yr ago      REVIEW OF SYSTEMS      General: no fevers/chills, no lethargy	    Skin/Breast: see HPI  	  Ophthalmologic: no eye pain or change in vision  	  ENMT: no dysphagia or change in hearing    Respiratory and Thorax: no SOB or cough  	  Cardiovascular: no palpitations or chest pain    Gastrointestinal: no abdominal pain or blood in stool     Genitourinary: no dysuria or frequency    Musculoskeletal: no joint pains or weakness	    Neurological: no weakness, numbness, or tingling    MEDICATIONS  (STANDING):  acetaminophen   Tablet .. 650 milliGRAM(s) Oral every 6 hours  aspirin enteric coated 81 milliGRAM(s) Oral daily  buDESOnide 160 MICROgram(s)/formoterol 4.5 MICROgram(s) Inhaler 2 Puff(s) Inhalation two times a day  calcium carbonate 1250 mG  + Vitamin D (OsCal 500 + D) 1 Tablet(s) Oral three times a day  chlorhexidine 4% Liquid 1 Application(s) Topical <User Schedule>  Dakins Solution - 1/4 Strength 1 Application(s) Topical two times a day  gabapentin 300 milliGRAM(s) Oral three times a day  loratadine 10 milliGRAM(s) Oral daily  melatonin 3 milliGRAM(s) Oral at bedtime  metoprolol tartrate 12.5 milliGRAM(s) Oral two times a day  pantoprazole    Tablet 40 milliGRAM(s) Oral before breakfast  polyethylene glycol 3350 17 Gram(s) Oral two times a day  predniSONE   Tablet 50 milliGRAM(s) Oral daily  simvastatin 20 milliGRAM(s) Oral at bedtime  tiotropium 18 MICROgram(s) Capsule 1 Capsule(s) Inhalation daily  vancomycin  IVPB 1000 milliGRAM(s) IV Intermittent every 24 hours  vancomycin  IVPB      warfarin 2 milliGRAM(s) Oral once    MEDICATIONS  (PRN):  senna 2 Tablet(s) Oral at bedtime PRN Constipation  simethicone 80 milliGRAM(s) Chew two times a day PRN Gas  traMADol 25 milliGRAM(s) Oral three times a day PRN Moderate Pain (4 - 6)      Allergies    amoxicillin (Other)    Intolerances    IV Contrast (Flushing (Skin); Nausea)      SOCIAL HISTORY:    FAMILY HISTORY:  No pertinent family history in first degree relatives      Vital Signs Last 24 Hrs  T(C): 36.6 (02 Oct 2018 07:55), Max: 36.8 (01 Oct 2018 22:08)  T(F): 97.9 (02 Oct 2018 07:55), Max: 98.2 (01 Oct 2018 22:08)  HR: 63 (02 Oct 2018 07:55) (63 - 67)  BP: 133/78 (02 Oct 2018 07:55) (128/84 - 133/78)  BP(mean): --  RR: 18 (02 Oct 2018 07:55) (16 - 18)  SpO2: 99% (02 Oct 2018 07:55) (99% - 99%)    PHYSICAL EXAM:     The patient was alert and oriented X 3, well nourished, and in no apparent distress.  OP showed no ulcerations  There was no visible lymphadenopathy.  Conjunctiva were non injected  There was no clubbing or edema of extremities.  The scalp, hair, face, eyebrows, lips, OP, neck, chest, back, extremities X 4, nails were examined.  There was no hyperhidrosis or bromhidrosis.    Of note on skin exam: ulcerations on the abdomen, upper thighs, healing well with granulation tissue       LABS:                        7.5    7.91  )-----------( 214      ( 02 Oct 2018 07:55 )             24.7     10-02    137  |  100  |  22  ----------------------------<  106<H>  4.9   |  26  |  0.63    Ca    8.5      02 Oct 2018 07:13      PT/INR - ( 02 Oct 2018 07:53 )   PT: 22.8 sec;   INR: 1.99 ratio         PTT - ( 02 Oct 2018 07:53 )  PTT:32.5 sec      RADIOLOGY & ADDITIONAL STUDIES:    Trena Das MD (PGY-3)   Dermatology Resident  [facility]  Pager: [phone]  Office [phone] HPI: 76 yo F with PMH HTN, CAD with PPM, recent admission for infected right knee hardware on minocycline by PICC at rehab center p/w multiple episodes of syncope, found to be hypotensive 60/40 by EMS, started on IVF by EMS, admitted for septic shock due to coagulase negative staph bacteremia.  Dermatology re-consulted on patient to determine if prednisone 50mg can be tapered which patient has been receiving for ulcerations of uknown etiology despite multiple biopsies, ?possibly pyoderma gangrenosum.  Patient denies any pain of ulcerations and states they "are healing up well."       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  Artificial cardiac pacemaker: 12/25/17  H/O cardiac catheterization: 11/29/16 HERBERT placed on LAD  H/O endoscopy: with dilatation of esophageal stricture 2013  S/P cataract surgery: x2; 3-4yr ago  S/P gastroplasty: 28 yrs ago  S/P cholecystectomy: more than 15 years ago  S/P total knee replacement: left, 15yr ago  S/P total knee replacement: right, 12yr ago  S/P hysterectomy: 24yr ago      REVIEW OF SYSTEMS      General: no fevers/chills, no lethargy	    Skin/Breast: see HPI  	  Ophthalmologic: no eye pain or change in vision  	  ENMT: no dysphagia or change in hearing    Respiratory and Thorax: no SOB or cough  	  Cardiovascular: no palpitations or chest pain    Gastrointestinal: no abdominal pain or blood in stool     Genitourinary: no dysuria or frequency    Musculoskeletal: no joint pains or weakness	    Neurological: no weakness, numbness, or tingling    MEDICATIONS  (STANDING):  acetaminophen   Tablet .. 650 milliGRAM(s) Oral every 6 hours  aspirin enteric coated 81 milliGRAM(s) Oral daily  buDESOnide 160 MICROgram(s)/formoterol 4.5 MICROgram(s) Inhaler 2 Puff(s) Inhalation two times a day  calcium carbonate 1250 mG  + Vitamin D (OsCal 500 + D) 1 Tablet(s) Oral three times a day  chlorhexidine 4% Liquid 1 Application(s) Topical <User Schedule>  Dakins Solution - 1/4 Strength 1 Application(s) Topical two times a day  gabapentin 300 milliGRAM(s) Oral three times a day  loratadine 10 milliGRAM(s) Oral daily  melatonin 3 milliGRAM(s) Oral at bedtime  metoprolol tartrate 12.5 milliGRAM(s) Oral two times a day  pantoprazole    Tablet 40 milliGRAM(s) Oral before breakfast  polyethylene glycol 3350 17 Gram(s) Oral two times a day  predniSONE   Tablet 50 milliGRAM(s) Oral daily  simvastatin 20 milliGRAM(s) Oral at bedtime  tiotropium 18 MICROgram(s) Capsule 1 Capsule(s) Inhalation daily  vancomycin  IVPB 1000 milliGRAM(s) IV Intermittent every 24 hours  vancomycin  IVPB      warfarin 2 milliGRAM(s) Oral once    MEDICATIONS  (PRN):  senna 2 Tablet(s) Oral at bedtime PRN Constipation  simethicone 80 milliGRAM(s) Chew two times a day PRN Gas  traMADol 25 milliGRAM(s) Oral three times a day PRN Moderate Pain (4 - 6)      Allergies    amoxicillin (Other)    Intolerances    IV Contrast (Flushing (Skin); Nausea)      SOCIAL HISTORY:    FAMILY HISTORY:  No pertinent family history in first degree relatives      Vital Signs Last 24 Hrs  T(C): 36.6 (02 Oct 2018 07:55), Max: 36.8 (01 Oct 2018 22:08)  T(F): 97.9 (02 Oct 2018 07:55), Max: 98.2 (01 Oct 2018 22:08)  HR: 63 (02 Oct 2018 07:55) (63 - 67)  BP: 133/78 (02 Oct 2018 07:55) (128/84 - 133/78)  BP(mean): --  RR: 18 (02 Oct 2018 07:55) (16 - 18)  SpO2: 99% (02 Oct 2018 07:55) (99% - 99%)    PHYSICAL EXAM:     The patient was alert and oriented X 3, well nourished, and in no apparent distress.  OP showed no ulcerations  There was no visible lymphadenopathy.  Conjunctiva were non injected  There was no clubbing or edema of extremities.  The scalp, hair, face, eyebrows, lips, OP, neck, chest, back, extremities X 4, nails were examined.  There was no hyperhidrosis or bromhidrosis.    Of note on skin exam: ulcerations on the abdomen, upper thighs bilaterally, healing well with re-epithelialization       LABS:                        7.5    7.91  )-----------( 214      ( 02 Oct 2018 07:55 )             24.7     10-02    137  |  100  |  22  ----------------------------<  106<H>  4.9   |  26  |  0.63    Ca    8.5      02 Oct 2018 07:13      PT/INR - ( 02 Oct 2018 07:53 )   PT: 22.8 sec;   INR: 1.99 ratio         PTT - ( 02 Oct 2018 07:53 )  PTT:32.5 sec      RADIOLOGY & ADDITIONAL STUDIES:    Trena Das MD (PGY-3)   Dermatology Resident  [facility]  Pager: [phone]  Office [phone]

## 2018-10-02 NOTE — PROGRESS NOTE ADULT - SUBJECTIVE AND OBJECTIVE BOX
CC: f/u for coag negative staph bacteremia    Patient reports no specific complaints, ERIKA yesterday did not show any evidence of endocarditis    REVIEW OF SYSTEMS:  All other review of systems negative (Comprehensive ROS)    Antimicrobials Day #  :day7  vancomycin  IVPB 1000 milliGRAM(s) IV Intermittent every 24 hours  vancomycin  IVPB        Other Medications Reviewed    T(F): 97.9 (10-02-18 @ 07:55), Max: 98.2 (10-01-18 @ 22:08)  HR: 63 (10-02-18 @ 07:55)  BP: 133/78 (10-02-18 @ 07:55)  RR: 18 (10-02-18 @ 07:55)  SpO2: 99% (10-02-18 @ 07:55)  Wt(kg): --    PHYSICAL EXAM:  General: alert, no acute distress  Eyes:  anicteric, no conjunctival injection, no discharge  Oropharynx: no lesions or injection 	  Neck: supple, without adenopathy  Lungs: clear to auscultation  Heart: regular rate and rhythm; no murmur, rubs or gallops  Abdomen: soft, nondistended, nontender, without mass or organomegaly  Skin: multiple wounds dressed  Extremities: no clubbing, cyanosis, or edema  Neurologic: alert, oriented, moves all extremities    LAB RESULTS:                        7.5    7.91  )-----------( 214      ( 02 Oct 2018 07:55 )             24.7     10-02    137  |  100  |  22  ----------------------------<  106<H>  4.9   |  26  |  0.63    Ca    8.5      02 Oct 2018 07:13          MICROBIOLOGY:  RECENT CULTURES:  09-29 @ 21:07 .Blood Blood-Peripheral     No growth to date.          RADIOLOGY REVIEWED:

## 2018-10-02 NOTE — CONSULT NOTE ADULT - ASSESSMENT
78 yo F with PMH HTN, CAD with PPM undergoing treatment with prednisone 50mg daily for ulcers possibly 2/2 pyoderma gangrenosum. 76 yo F with PMH HTN, CAD with PPM undergoing treatment with prednisone 50mg daily for ulcers possibly 2/2 pyoderma gangrenosum which have healed well on prednisone 50mg for the past 2     1) Ulcerations, improving  -OK to taper prednisone given improvement  -As patient has been on prednisone 50mg since end of July and multiple medical comorbidities, recommend endocrinology consult to discuss speed of taper to prevent adrenal insufficiency

## 2018-10-02 NOTE — PROGRESS NOTE ADULT - SUBJECTIVE AND OBJECTIVE BOX
readmitted for septic shock, picc removed  echo performed yesterday pending medical team assessment but per report no vegetations    afvss  nad  abdominal wound - wtd  RLE  thigh - dressing per wound care teams, lateral wound wet to dry per prs/wc teams  knee wound now with dry dressing, epithelializing, almost completely covered no drainage  remainder of wounds dressed by wound care  5/5 ta/ehl/gcs  silt l4-s1  2+ dp pulse readmitted for septic shock, picc removed  echo performed yesterday pending medical team assessment but per report no vegetations    afvss  nad  abdominal wound - wtd  RLE  thigh - dressing per wound care teams, lateral wound vac per prs/wc teams  knee wound now with dry dressing, epithelializing, almost completely covered no drainage  remainder of wounds dressed by wound care  5/5 ta/ehl/gcs  silt l4-s1  2+ dp pulse

## 2018-10-02 NOTE — PROGRESS NOTE ADULT - ASSESSMENT
Morbid obsesity  Failed Rt TKR, s/p explant, MRSA infection.  PMR,neutrophilic dermatosis managed with steroids.  This admission associated with hypotensive event, concerns of urosepsis but urine culture negative.  Coag negative staph in blood, PICC removed on 9/28.  ERIKA negative and blood culture after PICC removal negative.  Suggest:  1.Will limit vanco to dose on 10/3 and than will convert back to lifelong po  bid  2.No need from ID viewpoint for another PICC line  3.steroids as per dermatology  4.Discharge planning per primary team.

## 2018-10-02 NOTE — PROGRESS NOTE ADULT - ASSESSMENT
76yo female pmh HTN, CAD with PPM, recent admission for infected right knee hardware on minocycline by PICC at rehab center p/w multiple episodes of syncope, found to be hypotensive 60/40 by EMS, started on IVF by EMS, received 100cc prior to arrival, also hypoxic to low 90s on RA. Pt c/o dysuria and foul-smelling urine x 5 days, c/o dizziness. Denies fever, chills, chest pain, dyspnea, palpitations, dizziness, weakness, recent cough, nausea, vomiting, diarrhea, abdominal pain, BRBPR, melena, back pain, leg swelling.      septic shock coag negative staph bacteremia  - Repeat blood cultures NGTD  - iv vancomycin 1 g daily  -  picc line removed  - ERIKA done  - continue local wound care with wound vac per dr ramirez  - ortho follows for knee    multiple wounds  - wound care consult following    anemia  - s/p 1 unit of prbc  - monitor cbc    poss pyoderma  - cw prednisone  - will get derm involved for poss taper of steroids    CAD  - c/w asa    lumbar disc disease  - cw Neurontin    copd  - cw Advair and Spiriva    GERD  - cw Protonix    dvt px  - on coumadin  - hold for inr over 3  pt eval 76yo female pmh HTN, CAD with PPM, recent admission for infected right knee hardware on minocycline by PICC at rehab center p/w multiple episodes of syncope, found to be hypotensive 60/40 by EMS, started on IVF by EMS, received 100cc prior to arrival, also hypoxic to low 90s on RA. Pt c/o dysuria and foul-smelling urine x 5 days, c/o dizziness. Denies fever, chills, chest pain, dyspnea, palpitations, dizziness, weakness, recent cough, nausea, vomiting, diarrhea, abdominal pain, BRBPR, melena, back pain, leg swelling.      septic shock coag negative staph bacteremia  - Repeat blood cultures NGTD  - iv vancomycin 1 g daily  -  picc line removed  - ERIKA done  - continue local wound care with wound vac per dr ramirez  - ortho follows for knee    multiple wounds  - wound care consult following    anemia  - transfuse 1 unit of prbc  - monitor cbc    poss pyoderma  - cw prednisone  - will get derm involved for poss taper of steroids    CAD  - c/w asa    lumbar disc disease  - cw Neurontin    copd  - cw Advair and Spiriva    GERD  - cw Protonix    dvt px  - on coumadin  - hold for inr over 3  pt eval

## 2018-10-02 NOTE — PROGRESS NOTE ADULT - ASSESSMENT
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 requiring I&D, TKA explant, static spacer placement with complex wound closure by plastic surgery complicated by wound healing problems and development of pyoderma granulosum    wounds are managed by PRS/wound care  please reconsult rheumatology/dermatology before stopping/tapering prednisone as this is the only medication that has made it possible to manage her pyoderma granulosum which lead to multiple lower extremity and abdominal subcutaneous wounds with full thickness skin necrosis  abx per ID  continue to spend majority of day oob to wheelchair, okay to ambulate but must remain 50% weightbearing on RLE with NO KNEE MOTION allowed, knee immobilizer at all times if OOB  DVT ppx per primary, has hx DVT  Transfuse prn to hct>30 recommended for optimizing wound healing, consider hematology reconsult	  Nutrition consult recommended, optimize nutrition for continued wound healing  F/u with ortho after dc 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 requiring I&D, TKA explant, static spacer placement with complex wound closure by plastic surgery complicated by wound healing problems and development of pyoderma granulosum    wounds are managed by PRS/wound care  please reconsult rheumatology/dermatology before stopping/tapering prednisone as this is the only medication that has made it possible to 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 Rdz via telephone. He is angry that the echo took several hours yesterday and also that she is back int  hospital due to PICC-associated staph epi infection. I informed him that the last set of blood cultures has been negative and per the echo report no vegetations. We requests that the prednisone is tapered but I told him that the dermatology/rheumatology consultant services would need to be involved in making this decision as they are the teams that originally recommended this medication and it is the only medication that has allowed Mrs. Rdz to heal her wound by suppressing the pyoderma granulosum.

## 2018-10-02 NOTE — PROGRESS NOTE ADULT - SUBJECTIVE AND OBJECTIVE BOX
Patient is a 77y old  Female who presents with a chief complaint of dysuria (02 Oct 2018 08:04)      SUBJECTIVE / OVERNIGHT EVENTS:  No chest pain. No shortness of breath. No complaints. No events overnight.     MEDICATIONS  (STANDING):  acetaminophen   Tablet .. 650 milliGRAM(s) Oral every 6 hours  aspirin enteric coated 81 milliGRAM(s) Oral daily  buDESOnide 160 MICROgram(s)/formoterol 4.5 MICROgram(s) Inhaler 2 Puff(s) Inhalation two times a day  calcium carbonate 1250 mG  + Vitamin D (OsCal 500 + D) 1 Tablet(s) Oral three times a day  chlorhexidine 4% Liquid 1 Application(s) Topical <User Schedule>  Dakins Solution - 1/4 Strength 1 Application(s) Topical two times a day  gabapentin 300 milliGRAM(s) Oral three times a day  loratadine 10 milliGRAM(s) Oral daily  melatonin 3 milliGRAM(s) Oral at bedtime  metoprolol tartrate 12.5 milliGRAM(s) Oral two times a day  pantoprazole    Tablet 40 milliGRAM(s) Oral before breakfast  polyethylene glycol 3350 17 Gram(s) Oral two times a day  predniSONE   Tablet 50 milliGRAM(s) Oral daily  simvastatin 20 milliGRAM(s) Oral at bedtime  tiotropium 18 MICROgram(s) Capsule 1 Capsule(s) Inhalation daily  vancomycin  IVPB 1000 milliGRAM(s) IV Intermittent every 24 hours  vancomycin  IVPB        MEDICATIONS  (PRN):  senna 2 Tablet(s) Oral at bedtime PRN Constipation  simethicone 80 milliGRAM(s) Chew two times a day PRN Gas  traMADol 25 milliGRAM(s) Oral three times a day PRN Moderate Pain (4 - 6)      Vital Signs Last 24 Hrs  T(C): 36.6 (02 Oct 2018 07:55), Max: 36.8 (01 Oct 2018 22:08)  T(F): 97.9 (02 Oct 2018 07:55), Max: 98.2 (01 Oct 2018 22:08)  HR: 63 (02 Oct 2018 07:55) (63 - 67)  BP: 133/78 (02 Oct 2018 07:55) (128/84 - 133/78)  BP(mean): --  RR: 18 (02 Oct 2018 07:55) (16 - 18)  SpO2: 99% (02 Oct 2018 07:55) (99% - 99%)  CAPILLARY BLOOD GLUCOSE        I&O's Summary    01 Oct 2018 07:01  -  02 Oct 2018 07:00  --------------------------------------------------------  IN: 0 mL / OUT: 2250 mL / NET: -2250 mL        PHYSICAL EXAM:  GENERAL: NAD, well-developed  HEAD:  Atraumatic, Normocephalic  EYES: EOMI, PERRLA, conjunctiva and sclera clear  NECK: Supple, No JVD  CHEST/LUNG: Clear to auscultation bilaterally; No wheeze  HEART: Regular rate and rhythm; No murmurs, rubs, or gallops  ABDOMEN: Soft, Nontender, Nondistended; Bowel sounds present  EXTREMITIES:  2+ Peripheral Pulses, No clubbing, cyanosis, or edema  PSYCH: AAOx3  NEUROLOGY: non-focal  SKIN: No rashes or lesions    LABS:                        7.4    6.99  )-----------( 174      ( 01 Oct 2018 10:21 )             24.0     10-02    137  |  100  |  22  ----------------------------<  106<H>  4.9   |  26  |  0.63    Ca    8.5      02 Oct 2018 07:13      PT/INR - ( 02 Oct 2018 07:53 )   PT: 22.8 sec;   INR: 1.99 ratio         PTT - ( 02 Oct 2018 07:53 )  PTT:32.5 sec          RADIOLOGY & ADDITIONAL TESTS:    Imaging Personally Reviewed:  < from: Transesophageal Echocardiogram (10.01.18 @ 16:17) >  Observations:  Mitral Valve: Mitral annular calcification and calcified  mitral leaflets with normal diastolic opening. Mild mitral  regurgitation.  Aortic Valve/Aorta: Transcatheter aortic valve replacement.  The cusp  at the right cusp appears thickened. Peak  transaortic valve gradient equals 13 mm Hg, mean  transaortic valve gradient equals 7 mm Hg, which is  probably normal in the presence of a transcatheter aortic  valve replacement. Peak left ventricular outflow tract  gradient equals 4 mm Hg, mean gradient is equal to 2 mm Hg,  LVOT velocity time integral equals 22 cm.  Moderate atheroma noted in aortic arch/descendingaorta.  Left Atrium: No left atrial or left atrial appendage  thrombus.  Left Ventricle: Overall preserved left ventricular ejection  fraction. Normal left ventricular internal dimensions and  wall thicknesses.  Right Heart: Poor windows. A device wire is noted in the  right heart. Limited visualization of the RA wire. No  obvious vegetation  seen on the RV wire.  Normal right  ventricular size and function. Normal tricuspid valve.  Mild-moderate tricuspid regurgitation. Normal pulmonic  valve. Minimal pulmonic regurgitation.  Pericardium/Pleura: Normal pericardium with no pericardial  effusion.  Hemodynamic: Estimated right atrial pressure is 8 mm Hg.  Estimated right ventricular systolic pressure equals 44 mm  Hg, assuming right atrial pressure equals 8 mm Hg,  consistent with mild pulmonary hypertension. Color Doppler  demonstrates evidence of a patent foramen ovale.  ------------------------------------------------------------------------  Conclusions:  1. Mitral annular calcificationand calcified mitral  leaflets with normal diastolic opening. Mild mitral  regurgitation.  2. Transcatheter aortic valve replacement. The cusp  at the  right cusp appears thickened.  3. Moderate atheroma noted in aortic arch/descending aorta.  4. No left atrial or left atrial appendage thrombus.  5. Overall preserved left ventricular ejection fraction.  6. Poor windows. A device wire is noted in the right heart.  Limited visualization of the RA wire. No obvious vegetation   seen on the RV wire.  7. Normal right ventricular size and function.  8. Normal tricuspid valve. Mild-moderate tricuspid  regurgitation.    < end of copied text >      Consultant(s) Notes Reviewed:      Care Discussed with Consultants/Other Providers:

## 2018-10-03 LAB
ALBUMIN SERPL ELPH-MCNC: 2.8 G/DL — LOW (ref 3.3–5)
ALP SERPL-CCNC: 55 U/L — SIGNIFICANT CHANGE UP (ref 40–120)
ALT FLD-CCNC: 15 U/L — SIGNIFICANT CHANGE UP (ref 10–45)
ANION GAP SERPL CALC-SCNC: 8 MMOL/L — SIGNIFICANT CHANGE UP (ref 5–17)
APTT BLD: 33.1 SEC — SIGNIFICANT CHANGE UP (ref 27.5–37.4)
AST SERPL-CCNC: 11 U/L — SIGNIFICANT CHANGE UP (ref 10–40)
BILIRUB SERPL-MCNC: 0.2 MG/DL — SIGNIFICANT CHANGE UP (ref 0.2–1.2)
BUN SERPL-MCNC: 22 MG/DL — SIGNIFICANT CHANGE UP (ref 7–23)
CALCIUM SERPL-MCNC: 8.8 MG/DL — SIGNIFICANT CHANGE UP (ref 8.4–10.5)
CHLORIDE SERPL-SCNC: 101 MMOL/L — SIGNIFICANT CHANGE UP (ref 96–108)
CO2 SERPL-SCNC: 28 MMOL/L — SIGNIFICANT CHANGE UP (ref 22–31)
CREAT SERPL-MCNC: 0.73 MG/DL — SIGNIFICANT CHANGE UP (ref 0.5–1.3)
GLUCOSE SERPL-MCNC: 114 MG/DL — HIGH (ref 70–99)
HCT VFR BLD CALC: 27.3 % — LOW (ref 34.5–45)
HGB BLD-MCNC: 8.8 G/DL — LOW (ref 11.5–15.5)
INR BLD: 2.17 RATIO — HIGH (ref 0.88–1.16)
MCHC RBC-ENTMCNC: 27.1 PG — SIGNIFICANT CHANGE UP (ref 27–34)
MCHC RBC-ENTMCNC: 32.4 GM/DL — SIGNIFICANT CHANGE UP (ref 32–36)
MCV RBC AUTO: 83.8 FL — SIGNIFICANT CHANGE UP (ref 80–100)
PLATELET # BLD AUTO: 219 K/UL — SIGNIFICANT CHANGE UP (ref 150–400)
POTASSIUM SERPL-MCNC: 4.6 MMOL/L — SIGNIFICANT CHANGE UP (ref 3.5–5.3)
POTASSIUM SERPL-SCNC: 4.6 MMOL/L — SIGNIFICANT CHANGE UP (ref 3.5–5.3)
PROT SERPL-MCNC: 5.1 G/DL — LOW (ref 6–8.3)
PROTHROM AB SERPL-ACNC: 23.8 SEC — HIGH (ref 9.8–12.7)
RBC # BLD: 3.26 M/UL — LOW (ref 3.8–5.2)
RBC # FLD: 21.3 % — HIGH (ref 10.3–14.5)
SODIUM SERPL-SCNC: 137 MMOL/L — SIGNIFICANT CHANGE UP (ref 135–145)
WBC # BLD: 8.3 K/UL — SIGNIFICANT CHANGE UP (ref 3.8–10.5)
WBC # FLD AUTO: 8.3 K/UL — SIGNIFICANT CHANGE UP (ref 3.8–10.5)

## 2018-10-03 RX ORDER — WARFARIN SODIUM 2.5 MG/1
2 TABLET ORAL ONCE
Qty: 0 | Refills: 0 | Status: COMPLETED | OUTPATIENT
Start: 2018-10-03 | End: 2018-10-03

## 2018-10-03 RX ORDER — TRAMADOL HYDROCHLORIDE 50 MG/1
25 TABLET ORAL THREE TIMES A DAY
Qty: 0 | Refills: 0 | Status: DISCONTINUED | OUTPATIENT
Start: 2018-10-03 | End: 2018-10-05

## 2018-10-03 RX ORDER — MINOCYCLINE HYDROCHLORIDE 45 MG/1
100 TABLET, EXTENDED RELEASE ORAL
Qty: 0 | Refills: 0 | Status: DISCONTINUED | OUTPATIENT
Start: 2018-10-04 | End: 2018-10-05

## 2018-10-03 RX ORDER — NYSTATIN CREAM 100000 [USP'U]/G
1 CREAM TOPICAL
Qty: 0 | Refills: 0 | Status: DISCONTINUED | OUTPATIENT
Start: 2018-10-03 | End: 2018-10-05

## 2018-10-03 RX ADMIN — BUDESONIDE AND FORMOTEROL FUMARATE DIHYDRATE 2 PUFF(S): 160; 4.5 AEROSOL RESPIRATORY (INHALATION) at 05:54

## 2018-10-03 RX ADMIN — Medication 1 TABLET(S): at 14:14

## 2018-10-03 RX ADMIN — Medication 81 MILLIGRAM(S): at 12:33

## 2018-10-03 RX ADMIN — POLYETHYLENE GLYCOL 3350 17 GRAM(S): 17 POWDER, FOR SOLUTION ORAL at 05:54

## 2018-10-03 RX ADMIN — TRAMADOL HYDROCHLORIDE 25 MILLIGRAM(S): 50 TABLET ORAL at 13:30

## 2018-10-03 RX ADMIN — WARFARIN SODIUM 2 MILLIGRAM(S): 2.5 TABLET ORAL at 22:53

## 2018-10-03 RX ADMIN — SIMVASTATIN 20 MILLIGRAM(S): 20 TABLET, FILM COATED ORAL at 21:54

## 2018-10-03 RX ADMIN — Medication 3 MILLIGRAM(S): at 21:54

## 2018-10-03 RX ADMIN — PANTOPRAZOLE SODIUM 40 MILLIGRAM(S): 20 TABLET, DELAYED RELEASE ORAL at 05:53

## 2018-10-03 RX ADMIN — Medication 50 MILLIGRAM(S): at 05:53

## 2018-10-03 RX ADMIN — TRAMADOL HYDROCHLORIDE 25 MILLIGRAM(S): 50 TABLET ORAL at 21:53

## 2018-10-03 RX ADMIN — TRAMADOL HYDROCHLORIDE 25 MILLIGRAM(S): 50 TABLET ORAL at 12:33

## 2018-10-03 RX ADMIN — Medication 12.5 MILLIGRAM(S): at 17:59

## 2018-10-03 RX ADMIN — CHLORHEXIDINE GLUCONATE 1 APPLICATION(S): 213 SOLUTION TOPICAL at 17:57

## 2018-10-03 RX ADMIN — Medication 650 MILLIGRAM(S): at 17:58

## 2018-10-03 RX ADMIN — LORATADINE 10 MILLIGRAM(S): 10 TABLET ORAL at 12:33

## 2018-10-03 RX ADMIN — Medication 650 MILLIGRAM(S): at 05:53

## 2018-10-03 RX ADMIN — Medication 250 MILLIGRAM(S): at 17:59

## 2018-10-03 RX ADMIN — POLYETHYLENE GLYCOL 3350 17 GRAM(S): 17 POWDER, FOR SOLUTION ORAL at 21:54

## 2018-10-03 RX ADMIN — Medication 12.5 MILLIGRAM(S): at 05:52

## 2018-10-03 RX ADMIN — Medication 650 MILLIGRAM(S): at 00:26

## 2018-10-03 RX ADMIN — GABAPENTIN 300 MILLIGRAM(S): 400 CAPSULE ORAL at 21:54

## 2018-10-03 RX ADMIN — Medication 1 TABLET(S): at 21:54

## 2018-10-03 RX ADMIN — GABAPENTIN 300 MILLIGRAM(S): 400 CAPSULE ORAL at 14:13

## 2018-10-03 RX ADMIN — Medication 1 TABLET(S): at 05:53

## 2018-10-03 RX ADMIN — NYSTATIN CREAM 1 APPLICATION(S): 100000 CREAM TOPICAL at 18:00

## 2018-10-03 RX ADMIN — BUDESONIDE AND FORMOTEROL FUMARATE DIHYDRATE 2 PUFF(S): 160; 4.5 AEROSOL RESPIRATORY (INHALATION) at 17:58

## 2018-10-03 RX ADMIN — Medication 1 APPLICATION(S): at 18:00

## 2018-10-03 RX ADMIN — TIOTROPIUM BROMIDE 1 CAPSULE(S): 18 CAPSULE ORAL; RESPIRATORY (INHALATION) at 05:53

## 2018-10-03 RX ADMIN — GABAPENTIN 300 MILLIGRAM(S): 400 CAPSULE ORAL at 05:54

## 2018-10-03 NOTE — PROGRESS NOTE ADULT - SUBJECTIVE AND OBJECTIVE BOX
CC: f/u for coag negative staph bacteremia    Patient reports: no complaints, dermatology advising a steroid taper    REVIEW OF SYSTEMS:  All other review of systems negative (Comprehensive ROS)    Antimicrobials Day #  :day 8  vancomycin  IVPB 1000 milliGRAM(s) IV Intermittent every 24 hours  vancomycin  IVPB        Other Medications Reviewed    T(F): 98 (10-03-18 @ 09:30), Max: 98.8 (10-02-18 @ 17:14)  HR: 66 (10-03-18 @ 09:30)  BP: 133/73 (10-03-18 @ 09:30)  RR: 18 (10-03-18 @ 09:30)  SpO2: 98% (10-03-18 @ 09:30)  Wt(kg): --    PHYSICAL EXAM:  General: alert, no acute distress, cushingoid  Eyes:  anicteric, no conjunctival injection, no discharge  Oropharynx: no lesions or injection 	  Neck: supple, without adenopathy  Lungs: clear to auscultation, diminished at bases  Heart: regular rate and rhythm; no murmur, rubs or gallops  Abdomen: soft, nondistended, nontender, without mass or organomegaly  Skin: multiple wounds dressed  Extremities: no clubbing, cyanosis, or edema  Neurologic: alert, oriented, moves all extremities    LAB RESULTS:                        8.8    8.3   )-----------( 219      ( 03 Oct 2018 08:49 )             27.3     10-03    137  |  101  |  22  ----------------------------<  114<H>  4.6   |  28  |  0.73    Ca    8.8      03 Oct 2018 08:49    TPro  5.1<L>  /  Alb  2.8<L>  /  TBili  0.2  /  DBili  x   /  AST  11  /  ALT  15  /  AlkPhos  55  10-03    LIVER FUNCTIONS - ( 03 Oct 2018 08:49 )  Alb: 2.8 g/dL / Pro: 5.1 g/dL / ALK PHOS: 55 U/L / ALT: 15 U/L / AST: 11 U/L / GGT: x             MICROBIOLOGY:  RECENT CULTURES:  09-29 @ 21:07 .Blood Blood-Peripheral     No growth to date.          RADIOLOGY REVIEWED:

## 2018-10-03 NOTE — PROGRESS NOTE ADULT - SUBJECTIVE AND OBJECTIVE BOX
Patient is a 77y old  Female who presents with a chief complaint of dysuria (02 Oct 2018 15:57)      SUBJECTIVE / OVERNIGHT EVENTS:  No chest pain. No shortness of breath. No complaints. No events overnight.     MEDICATIONS  (STANDING):  acetaminophen   Tablet .. 650 milliGRAM(s) Oral every 6 hours  aspirin enteric coated 81 milliGRAM(s) Oral daily  buDESOnide 160 MICROgram(s)/formoterol 4.5 MICROgram(s) Inhaler 2 Puff(s) Inhalation two times a day  calcium carbonate 1250 mG  + Vitamin D (OsCal 500 + D) 1 Tablet(s) Oral three times a day  chlorhexidine 4% Liquid 1 Application(s) Topical <User Schedule>  Dakins Solution - 1/4 Strength 1 Application(s) Topical two times a day  gabapentin 300 milliGRAM(s) Oral three times a day  loratadine 10 milliGRAM(s) Oral daily  melatonin 3 milliGRAM(s) Oral at bedtime  metoprolol tartrate 12.5 milliGRAM(s) Oral two times a day  nystatin Powder 1 Application(s) Topical two times a day  pantoprazole    Tablet 40 milliGRAM(s) Oral before breakfast  polyethylene glycol 3350 17 Gram(s) Oral two times a day  predniSONE   Tablet 40 milliGRAM(s) Oral daily  simvastatin 20 milliGRAM(s) Oral at bedtime  tiotropium 18 MICROgram(s) Capsule 1 Capsule(s) Inhalation daily  vancomycin  IVPB 1000 milliGRAM(s) IV Intermittent every 24 hours  vancomycin  IVPB        MEDICATIONS  (PRN):  senna 2 Tablet(s) Oral at bedtime PRN Constipation  simethicone 80 milliGRAM(s) Chew two times a day PRN Gas      Vital Signs Last 24 Hrs  T(C): 36.7 (03 Oct 2018 09:30), Max: 37.1 (02 Oct 2018 17:14)  T(F): 98 (03 Oct 2018 09:30), Max: 98.8 (02 Oct 2018 17:14)  HR: 66 (03 Oct 2018 09:30) (61 - 79)  BP: 133/73 (03 Oct 2018 09:30) (132/83 - 164/84)  BP(mean): --  RR: 18 (03 Oct 2018 09:30) (16 - 18)  SpO2: 98% (03 Oct 2018 09:30) (98% - 100%)  CAPILLARY BLOOD GLUCOSE        I&O's Summary    02 Oct 2018 07:01  -  03 Oct 2018 07:00  --------------------------------------------------------  IN: 850 mL / OUT: 750 mL / NET: 100 mL        PHYSICAL EXAM:  GENERAL: NAD, well-developed  HEAD:  Atraumatic, Normocephalic  EYES: EOMI, PERRLA, conjunctiva and sclera clear  NECK: Supple, No JVD  CHEST/LUNG: Clear to auscultation bilaterally; No wheeze  HEART: Regular rate and rhythm; No murmurs, rubs, or gallops  ABDOMEN: Soft, Nontender, Nondistended; Bowel sounds present  EXTREMITIES:  2+ Peripheral Pulses, No clubbing, cyanosis, or edema  PSYCH: AAOx3  NEUROLOGY: non-focal  SKIN: No rashes or lesions    LABS:                        8.8    8.3   )-----------( 219      ( 03 Oct 2018 08:49 )             27.3     10-03    137  |  101  |  22  ----------------------------<  114<H>  4.6   |  28  |  0.73    Ca    8.8      03 Oct 2018 08:49    TPro  5.1<L>  /  Alb  2.8<L>  /  TBili  0.2  /  DBili  x   /  AST  11  /  ALT  15  /  AlkPhos  55  10-03    PT/INR - ( 02 Oct 2018 07:53 )   PT: 22.8 sec;   INR: 1.99 ratio         PTT - ( 02 Oct 2018 07:53 )  PTT:32.5 sec          RADIOLOGY & ADDITIONAL TESTS:    Imaging Personally Reviewed:    Consultant(s) Notes Reviewed:      Care Discussed with Consultants/Other Providers:

## 2018-10-03 NOTE — PROGRESS NOTE ADULT - ASSESSMENT
78yo female pmh HTN, CAD with PPM, recent admission for infected right knee hardware on minocycline by PICC at rehab center p/w multiple episodes of syncope, found to be hypotensive 60/40 by EMS, started on IVF by EMS, received 100cc prior to arrival, also hypoxic to low 90s on RA. Pt c/o dysuria and foul-smelling urine x 5 days, c/o dizziness. Denies fever, chills, chest pain, dyspnea, palpitations, dizziness, weakness, recent cough, nausea, vomiting, diarrhea, abdominal pain, BRBPR, melena, back pain, leg swelling.      septic shock coag negative staph bacteremia  - Repeat blood cultures NGTD  - iv vancomycin 1 g daily  -  picc line removed  - ERIKA done  - continue local wound care with wound vac per dr ramirez  - ortho follows for knee    multiple wounds  - wound care consult following    anemia  - transfuse 1 unit of prbc  - monitor cbc    poss pyoderma  - cw prednisone  - will get derm involved for poss taper of steroids    CAD  - c/w asa    lumbar disc disease  - cw Neurontin    copd  - cw Advair and Spiriva    GERD  - cw Protonix    dvt px  - on coumadin  - hold for inr over 3  pt eval 78yo female pmh HTN, CAD with PPM, recent admission for infected right knee hardware on minocycline by PICC at rehab center p/w multiple episodes of syncope, found to be hypotensive 60/40 by EMS, started on IVF by EMS, received 100cc prior to arrival, also hypoxic to low 90s on RA. Pt c/o dysuria and foul-smelling urine x 5 days, c/o dizziness. Denies fever, chills, chest pain, dyspnea, palpitations, dizziness, weakness, recent cough, nausea, vomiting, diarrhea, abdominal pain, BRBPR, melena, back pain, leg swelling.      septic shock coag negative staph bacteremia  - Repeat blood cultures NGTD  - iv vancomycin 1 g daily, cont until tomorrow  -  picc line removed  - ERIKA done, no vegitations  - ortho follows for knee    multiple wounds  - wound care consult following  - continue local wound care with wound vac per dr ramirez    anemia  - s/p transfusion 1 unit of prbc  - monitor cbc    poss pyoderma  - cw prednisone  - seen by  derm . will  taper of steroids    CAD  - c/w asa    lumbar disc disease  - cw Neurontin    copd  - cw Advair and Spiriva    GERD  - cw Protonix    dvt px  - on coumadin  - hold for inr over 3  pt eval    d/c planning for tomorrow  - d/w case management

## 2018-10-03 NOTE — PROGRESS NOTE ADULT - ASSESSMENT
Morbid obsesity  Failed Rt TKR, s/p explant, MRSA infection.  PMR,neutrophilic dermatosis managed with steroids.  This admission associated with hypotensive event, concerns of urosepsis but urine culture negative.  Coag negative staph in blood, PICC removed on 9/28.  ERIKA negative and blood culture after PICC removal negative.  Suggest:  1.Will limit vanco to dose on 10/3 and than will convert back to lifelong po  bid  2.No need from ID viewpoint for another PICC line  3.steroids as per dermatology, endorse taper  4.Discharge planning per primary team.Case reviewed with Dr Fernandez,discharge planning for 10/4

## 2018-10-03 NOTE — CHART NOTE - NSCHARTNOTEFT_GEN_A_CORE
Patient seen with occupational therapy for evaluation for right elbow orthosis to limit flexion extension.  Fit and apply innovator hinged elbow orthosis locked at 60 degrees. Contact information given to family. To notify office with any issues questions or concerns.  Bernardo DALY  [facility]  [phone]

## 2018-10-04 DIAGNOSIS — E27.3 DRUG-INDUCED ADRENOCORTICAL INSUFFICIENCY: ICD-10-CM

## 2018-10-04 DIAGNOSIS — M35.3 POLYMYALGIA RHEUMATICA: ICD-10-CM

## 2018-10-04 DIAGNOSIS — I25.10 ATHEROSCLEROTIC HEART DISEASE OF NATIVE CORONARY ARTERY WITHOUT ANGINA PECTORIS: ICD-10-CM

## 2018-10-04 DIAGNOSIS — N39.0 URINARY TRACT INFECTION, SITE NOT SPECIFIED: ICD-10-CM

## 2018-10-04 LAB
ANION GAP SERPL CALC-SCNC: 7 MMOL/L — SIGNIFICANT CHANGE UP (ref 5–17)
APTT BLD: 31.2 SEC — SIGNIFICANT CHANGE UP (ref 27.5–37.4)
BUN SERPL-MCNC: 23 MG/DL — SIGNIFICANT CHANGE UP (ref 7–23)
CALCIUM SERPL-MCNC: 8.5 MG/DL — SIGNIFICANT CHANGE UP (ref 8.4–10.5)
CHLORIDE SERPL-SCNC: 103 MMOL/L — SIGNIFICANT CHANGE UP (ref 96–108)
CO2 SERPL-SCNC: 29 MMOL/L — SIGNIFICANT CHANGE UP (ref 22–31)
CREAT SERPL-MCNC: 0.66 MG/DL — SIGNIFICANT CHANGE UP (ref 0.5–1.3)
CULTURE RESULTS: SIGNIFICANT CHANGE UP
GLUCOSE SERPL-MCNC: 125 MG/DL — HIGH (ref 70–99)
HCT VFR BLD CALC: 28.4 % — LOW (ref 34.5–45)
HGB BLD-MCNC: 9.1 G/DL — LOW (ref 11.5–15.5)
INR BLD: 2.21 RATIO — HIGH (ref 0.88–1.16)
MCHC RBC-ENTMCNC: 27.1 PG — SIGNIFICANT CHANGE UP (ref 27–34)
MCHC RBC-ENTMCNC: 32.1 GM/DL — SIGNIFICANT CHANGE UP (ref 32–36)
MCV RBC AUTO: 84.7 FL — SIGNIFICANT CHANGE UP (ref 80–100)
PLATELET # BLD AUTO: 249 K/UL — SIGNIFICANT CHANGE UP (ref 150–400)
POTASSIUM SERPL-MCNC: 4.8 MMOL/L — SIGNIFICANT CHANGE UP (ref 3.5–5.3)
POTASSIUM SERPL-SCNC: 4.8 MMOL/L — SIGNIFICANT CHANGE UP (ref 3.5–5.3)
PROTHROM AB SERPL-ACNC: 24.3 SEC — HIGH (ref 9.8–12.7)
RBC # BLD: 3.35 M/UL — LOW (ref 3.8–5.2)
RBC # FLD: 21.1 % — HIGH (ref 10.3–14.5)
SODIUM SERPL-SCNC: 139 MMOL/L — SIGNIFICANT CHANGE UP (ref 135–145)
SPECIMEN SOURCE: SIGNIFICANT CHANGE UP
WBC # BLD: 7.6 K/UL — SIGNIFICANT CHANGE UP (ref 3.8–10.5)
WBC # FLD AUTO: 7.6 K/UL — SIGNIFICANT CHANGE UP (ref 3.8–10.5)

## 2018-10-04 PROCEDURE — 99232 SBSQ HOSP IP/OBS MODERATE 35: CPT

## 2018-10-04 RX ORDER — VANCOMYCIN HCL 1 G
1000 VIAL (EA) INTRAVENOUS EVERY 24 HOURS
Qty: 0 | Refills: 0 | Status: DISCONTINUED | OUTPATIENT
Start: 2018-10-04 | End: 2018-10-05

## 2018-10-04 RX ADMIN — MINOCYCLINE HYDROCHLORIDE 100 MILLIGRAM(S): 45 TABLET, EXTENDED RELEASE ORAL at 07:06

## 2018-10-04 RX ADMIN — GABAPENTIN 300 MILLIGRAM(S): 400 CAPSULE ORAL at 06:26

## 2018-10-04 RX ADMIN — TRAMADOL HYDROCHLORIDE 25 MILLIGRAM(S): 50 TABLET ORAL at 14:21

## 2018-10-04 RX ADMIN — MINOCYCLINE HYDROCHLORIDE 100 MILLIGRAM(S): 45 TABLET, EXTENDED RELEASE ORAL at 18:09

## 2018-10-04 RX ADMIN — NYSTATIN CREAM 1 APPLICATION(S): 100000 CREAM TOPICAL at 18:09

## 2018-10-04 RX ADMIN — GABAPENTIN 300 MILLIGRAM(S): 400 CAPSULE ORAL at 13:25

## 2018-10-04 RX ADMIN — TRAMADOL HYDROCHLORIDE 25 MILLIGRAM(S): 50 TABLET ORAL at 06:26

## 2018-10-04 RX ADMIN — Medication 3 MILLIGRAM(S): at 23:17

## 2018-10-04 RX ADMIN — Medication 250 MILLIGRAM(S): at 23:28

## 2018-10-04 RX ADMIN — Medication 650 MILLIGRAM(S): at 18:38

## 2018-10-04 RX ADMIN — Medication 650 MILLIGRAM(S): at 18:08

## 2018-10-04 RX ADMIN — Medication 650 MILLIGRAM(S): at 06:27

## 2018-10-04 RX ADMIN — Medication 650 MILLIGRAM(S): at 13:56

## 2018-10-04 RX ADMIN — Medication 1 TABLET(S): at 13:25

## 2018-10-04 RX ADMIN — Medication 650 MILLIGRAM(S): at 23:46

## 2018-10-04 RX ADMIN — BUDESONIDE AND FORMOTEROL FUMARATE DIHYDRATE 2 PUFF(S): 160; 4.5 AEROSOL RESPIRATORY (INHALATION) at 18:08

## 2018-10-04 RX ADMIN — GABAPENTIN 300 MILLIGRAM(S): 400 CAPSULE ORAL at 23:15

## 2018-10-04 RX ADMIN — TRAMADOL HYDROCHLORIDE 25 MILLIGRAM(S): 50 TABLET ORAL at 09:51

## 2018-10-04 RX ADMIN — Medication 650 MILLIGRAM(S): at 13:26

## 2018-10-04 RX ADMIN — SIMVASTATIN 20 MILLIGRAM(S): 20 TABLET, FILM COATED ORAL at 23:15

## 2018-10-04 RX ADMIN — CHLORHEXIDINE GLUCONATE 1 APPLICATION(S): 213 SOLUTION TOPICAL at 09:18

## 2018-10-04 RX ADMIN — TRAMADOL HYDROCHLORIDE 25 MILLIGRAM(S): 50 TABLET ORAL at 09:21

## 2018-10-04 RX ADMIN — Medication 1 TABLET(S): at 07:06

## 2018-10-04 RX ADMIN — Medication 1 TABLET(S): at 23:16

## 2018-10-04 RX ADMIN — Medication 1 APPLICATION(S): at 18:10

## 2018-10-04 RX ADMIN — LORATADINE 10 MILLIGRAM(S): 10 TABLET ORAL at 13:25

## 2018-10-04 RX ADMIN — Medication 12.5 MILLIGRAM(S): at 07:06

## 2018-10-04 RX ADMIN — NYSTATIN CREAM 1 APPLICATION(S): 100000 CREAM TOPICAL at 07:06

## 2018-10-04 RX ADMIN — TIOTROPIUM BROMIDE 1 CAPSULE(S): 18 CAPSULE ORAL; RESPIRATORY (INHALATION) at 06:31

## 2018-10-04 RX ADMIN — Medication 81 MILLIGRAM(S): at 13:26

## 2018-10-04 RX ADMIN — Medication 12.5 MILLIGRAM(S): at 18:09

## 2018-10-04 RX ADMIN — BUDESONIDE AND FORMOTEROL FUMARATE DIHYDRATE 2 PUFF(S): 160; 4.5 AEROSOL RESPIRATORY (INHALATION) at 06:31

## 2018-10-04 RX ADMIN — TRAMADOL HYDROCHLORIDE 25 MILLIGRAM(S): 50 TABLET ORAL at 13:51

## 2018-10-04 RX ADMIN — PANTOPRAZOLE SODIUM 40 MILLIGRAM(S): 20 TABLET, DELAYED RELEASE ORAL at 07:06

## 2018-10-04 RX ADMIN — Medication 650 MILLIGRAM(S): at 23:16

## 2018-10-04 NOTE — PROGRESS NOTE ADULT - SUBJECTIVE AND OBJECTIVE BOX
CC: f/u for cns bacteremia, picc infection, mrsa right tkr infection    Patient reports she is very happy, plans to go home    REVIEW OF SYSTEMS:  All other review of systems negative (Comprehensive ROS)    Antimicrobials Day #  :  minocycline 100 milliGRAM(s) Oral two times a day  vancomycin  IVPB 1000 milliGRAM(s) IV Intermittent every 24 hours  day 9/10    Other Medications Reviewed    T(F): 98 (10-04-18 @ 08:16), Max: 98.1 (10-03-18 @ 22:05)  HR: 65 (10-04-18 @ 08:16)  BP: 143/80 (10-04-18 @ 08:16)  RR: 18 (10-04-18 @ 08:16)  SpO2: 96% (10-04-18 @ 08:16)  Wt(kg): --    PHYSICAL EXAM:  General: alert, no acute distress  Eyes:  anicteric, no conjunctival injection, no discharge  Oropharynx: no lesions or injection 	  Neck: supple, without adenopathy  Lungs: clear to auscultation  Heart: regular rate and rhythm; no murmur, rubs or gallops  Abdomen: soft, nondistended, nontender, without mass or organomegaly. wounds are clean  Skin: no rash  Extremities: wounds are dressed  Neurologic: alert, oriented, moves all extremities    LAB RESULTS:                        9.1    7.6   )-----------( 249      ( 04 Oct 2018 08:32 )             28.4     10-04    139  |  103  |  23  ----------------------------<  125<H>  4.8   |  29  |  0.66    Ca    8.5      04 Oct 2018 08:24    TPro  5.1<L>  /  Alb  2.8<L>  /  TBili  0.2  /  DBili  x   /  AST  11  /  ALT  15  /  AlkPhos  55  10-03    LIVER FUNCTIONS - ( 03 Oct 2018 08:49 )  Alb: 2.8 g/dL / Pro: 5.1 g/dL / ALK PHOS: 55 U/L / ALT: 15 U/L / AST: 11 U/L / GGT: x             MICROBIOLOGY:  RECENT CULTURES:  09-29 @ 21:07 .Blood Blood-Peripheral     No growth to date.          RADIOLOGY REVIEWED:      < from: CT Angio Chest w/ IV Cont (09.25.18 @ 13:21) >  IMPRESSION:     No pulmonary embolus.    New small-moderate pleural effusions with adjacent compressive   atelectasis. Underlying infection cannot be ruled out. Recommend clinical   correlation for pneumonia.          < end of copied text >    < from: Transesophageal Echocardiogram (10.01.18 @ 16:17) >  Conclusions:  1. Mitral annular calcificationand calcified mitral  leaflets with normal diastolic opening. Mild mitral  regurgitation.  2. Transcatheter aortic valve replacement. The cusp  at the  right cusp appears thickened.  3. Moderate atheroma noted in aortic arch/descending aorta.  4. No left atrial or left atrial appendage thrombus.  5. Overall preserved left ventricular ejection fraction.  6. Poor windows. A device wire is noted in the right heart.  Limited visualization of the RA wire. No obvious vegetation   seen on the RV wire.  7. Normal right ventricular size and function.  8. Normal tricuspid valve. Mild-moderate tricuspid  regurgitation.  ------------------------------------------------------------------------  Confirmed on  10/1/2018 - 19:02:23 by     < end of copied text >        Assessment:  Patient with picc associated cns bacteremia now cleared and had picc pulled and zoe negative for vegetation  Plan:Per Dr. Fernandez pt to stay on vanco until discharge but would favor limiting to one more day at most  for lifelong minocycline for mrsa knee infection  local wound care and steroids per derm for neutrophilic dermatosis CC: f/u for cns bacteremia, picc infection, mrsa right tkr infection    Patient reports she is very happy, plans to go home    REVIEW OF SYSTEMS:  All other review of systems negative (Comprehensive ROS)    Antimicrobials Day #  :  minocycline 100 milliGRAM(s) Oral two times a day  vancomycin  IVPB 1000 milliGRAM(s) IV Intermittent every 24 hours  day 9/10    Other Medications Reviewed    T(F): 98 (10-04-18 @ 08:16), Max: 98.1 (10-03-18 @ 22:05)  HR: 65 (10-04-18 @ 08:16)  BP: 143/80 (10-04-18 @ 08:16)  RR: 18 (10-04-18 @ 08:16)  SpO2: 96% (10-04-18 @ 08:16)  Wt(kg): --    PHYSICAL EXAM:  General: alert, no acute distress  Eyes:  anicteric, no conjunctival injection, no discharge  Oropharynx: no lesions or injection 	  Neck: supple, without adenopathy  Lungs: clear to auscultation  Heart: regular rate and rhythm; no murmur, rubs or gallops  Abdomen: soft, nondistended, nontender, without mass or organomegaly. wounds are clean  Skin: no rash  Extremities: wounds are dressed  Neurologic: alert, oriented, moves all extremities    LAB RESULTS:                        9.1    7.6   )-----------( 249      ( 04 Oct 2018 08:32 )             28.4     10-04    139  |  103  |  23  ----------------------------<  125<H>  4.8   |  29  |  0.66    Ca    8.5      04 Oct 2018 08:24    TPro  5.1<L>  /  Alb  2.8<L>  /  TBili  0.2  /  DBili  x   /  AST  11  /  ALT  15  /  AlkPhos  55  10-03    LIVER FUNCTIONS - ( 03 Oct 2018 08:49 )  Alb: 2.8 g/dL / Pro: 5.1 g/dL / ALK PHOS: 55 U/L / ALT: 15 U/L / AST: 11 U/L / GGT: x             MICROBIOLOGY:  RECENT CULTURES:  09-29 @ 21:07 .Blood Blood-Peripheral     No growth to date.          RADIOLOGY REVIEWED:      < from: CT Angio Chest w/ IV Cont (09.25.18 @ 13:21) >  IMPRESSION:     No pulmonary embolus.    New small-moderate pleural effusions with adjacent compressive   atelectasis. Underlying infection cannot be ruled out. Recommend clinical   correlation for pneumonia.          < end of copied text >    < from: Transesophageal Echocardiogram (10.01.18 @ 16:17) >  Conclusions:  1. Mitral annular calcificationand calcified mitral  leaflets with normal diastolic opening. Mild mitral  regurgitation.  2. Transcatheter aortic valve replacement. The cusp  at the  right cusp appears thickened.  3. Moderate atheroma noted in aortic arch/descending aorta.  4. No left atrial or left atrial appendage thrombus.  5. Overall preserved left ventricular ejection fraction.  6. Poor windows. A device wire is noted in the right heart.  Limited visualization of the RA wire. No obvious vegetation   seen on the RV wire.  7. Normal right ventricular size and function.  8. Normal tricuspid valve. Mild-moderate tricuspid  regurgitation.  ------------------------------------------------------------------------  Confirmed on  10/1/2018 - 19:02:23 by     < end of copied text >        Assessment:  Patient with picc associated cns bacteremia now cleared and had picc pulled and zoe negative for vegetation  Plan:Per Dr. Fernandez pt to stay on vanco until discharge but would favor limiting to one more day at most  for lifelong minocycline for mrsa knee infection  local wound care and off steroids per derm for neutrophilic dermatosis

## 2018-10-04 NOTE — PROGRESS NOTE ADULT - SUBJECTIVE AND OBJECTIVE BOX
doing much better, blood cultures now negative    afvss  nad  abdominal wound - wtd  RLE  thigh - dressing per wound care teams, lateral wound vac per prs/wc teams  knee wound now with dry dressing, epithelializing, almost completely covered no drainage  remainder of wounds dressed by wound care  5/5 ta/ehl/gcs  silt l4-s1  2+ dp pulse

## 2018-10-04 NOTE — PROGRESS NOTE ADULT - ASSESSMENT
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 requiring I&D, TKA explant, static spacer placement with complex wound closure by plastic surgery complicated by wound healing problems and development of pyoderma granulosum    wounds are managed by PRS/wound care  agree with steriod taper, per derm needs endocrine consult for assisttance either prior to dc or as outpatient  abx per ID, lifelong minocycline for chronic suppression  continue to spend majority of day oob to wheelchair, okay to ambulate but must remain 50% weightbearing on RLE with NO KNEE MOTION allowed, knee immobilizer at all times if OOB  DVT ppx per primary, has hx DVT  Nutrition consult recommended, optimize nutrition for continued wound healing  F/u with ortho after dc  	  All of the above has been discussed with Mr. Rdz via telephone.

## 2018-10-04 NOTE — CONSULT NOTE ADULT - SUBJECTIVE AND OBJECTIVE BOX
HPI:  78yo female pmh HTN, CAD with PPM, recent admission for infected right knee hardware on minocycline by PICC at rehab center p/w multiple episodes of syncope, found to be hypotensive 60/40 by EMS, started on IVF by EMS, received 100cc prior to arrival, also hypoxic to low 90s on RA. Pt c/o dysuria and foul-smelling urine x 5 days, c/o dizziness. Denies fever, chills, chest pain, dyspnea, palpitations, dizziness, weakness, recent cough, nausea, vomiting, diarrhea, abdominal pain, BRBPR, melena, back pain, leg swelling. (25 Sep 2018 19:40)  Patient has no history of chronic steroid use, denies any hypoglycemic episodes, has been on high dose steroids now, patient is stable vitally and clinically.  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  Artificial cardiac pacemaker: 12/25/17  H/O cardiac catheterization: 11/29/16 HERBERT placed on LAD  H/O endoscopy: with dilatation of esophageal stricture 2013  S/P cataract surgery: x2; 3-4yr ago  S/P gastroplasty: 28 yrs ago  S/P cholecystectomy: more than 15 years ago  S/P total knee replacement: left, 15yr ago  S/P total knee replacement: right, 12yr ago  S/P hysterectomy: 24yr ago      FAMILY HISTORY:  No pertinent family history in first degree relatives      Social History:    Outpatient Medications:    MEDICATIONS  (STANDING):  acetaminophen   Tablet .. 650 milliGRAM(s) Oral every 6 hours  aspirin enteric coated 81 milliGRAM(s) Oral daily  buDESOnide 160 MICROgram(s)/formoterol 4.5 MICROgram(s) Inhaler 2 Puff(s) Inhalation two times a day  calcium carbonate 1250 mG  + Vitamin D (OsCal 500 + D) 1 Tablet(s) Oral three times a day  chlorhexidine 4% Liquid 1 Application(s) Topical <User Schedule>  Dakins Solution - 1/4 Strength 1 Application(s) Topical two times a day  gabapentin 300 milliGRAM(s) Oral three times a day  loratadine 10 milliGRAM(s) Oral daily  melatonin 3 milliGRAM(s) Oral at bedtime  metoprolol tartrate 12.5 milliGRAM(s) Oral two times a day  minocycline 100 milliGRAM(s) Oral two times a day  nystatin Powder 1 Application(s) Topical two times a day  pantoprazole    Tablet 40 milliGRAM(s) Oral before breakfast  polyethylene glycol 3350 17 Gram(s) Oral two times a day  predniSONE   Tablet 40 milliGRAM(s) Oral daily  simvastatin 20 milliGRAM(s) Oral at bedtime  tiotropium 18 MICROgram(s) Capsule 1 Capsule(s) Inhalation daily  vancomycin  IVPB 1000 milliGRAM(s) IV Intermittent every 24 hours    MEDICATIONS  (PRN):  senna 2 Tablet(s) Oral at bedtime PRN Constipation  simethicone 80 milliGRAM(s) Chew two times a day PRN Gas  traMADol 25 milliGRAM(s) Oral three times a day PRN Moderate Pain (4 - 6)      Allergies    amoxicillin (Other)    Intolerances    IV Contrast (Flushing (Skin); Nausea)    Review of Systems:  Constitutional: No fever, no chills  Eyes: No blurry vision  Neuro: No tremors  HEENT: No pain, no neck swelling  Cardiovascular: No chest pain, no palpitations  Respiratory: Has SOB, no cough  GI: No nausea, vomiting, abdominal pain  : No dysuria  Skin: no rash  MSK: Has leg swelling.  Psych: no depression  Endocrine: no polyuria, polydipsia    ALL OTHER SYSTEMS REVIEWED AND NEGATIVE    UNABLE TO OBTAIN    PHYSICAL EXAM:  VITALS: T(C): 36.7 (10-04-18 @ 08:16)  T(F): 98 (10-04-18 @ 08:16), Max: 98.1 (10-03-18 @ 22:05)  HR: 65 (10-04-18 @ 08:16) (62 - 69)  BP: 143/80 (10-04-18 @ 08:16) (143/80 - 145/82)  RR:  (18 - 18)  SpO2:  (95% - 100%)  Wt(kg): --  GENERAL: NAD, well-groomed, well-developed  EYES: No proptosis, no lid lag  HEENT:  Atraumatic, Normocephalic  THYROID: Normal size, no palpable nodules  RESPIRATORY: Clear to auscultation bilaterally; No rales, rhonchi, wheezing  CARDIOVASCULAR: Si S2, No murmurs;  GI: Soft, non distended, normal bowel sounds  SKIN: Dry, intact, No rashes or lesions  MUSCULOSKELETAL: Has BL lower extremity edema.  NEURO:  no tremor, sensation decreased in feet BL,                              9.1    7.6   )-----------( 249      ( 04 Oct 2018 08:32 )             28.4       10-04    139  |  103  |  23  ----------------------------<  125<H>  4.8   |  29  |  0.66    EGFR if : 97  EGFR if non : 84    Ca    8.5      10-04    TPro  5.1<L>  /  Alb  2.8<L>  /  TBili  0.2  /  DBili  x   /  AST  11  /  ALT  15  /  AlkPhos  55  10-03      Thyroid Function Tests:  09-26 @ 12:24 TSH 1.04 FreeT4 -- T3 -- Anti TPO -- Anti Thyroglobulin Ab -- TSI --              Radiology:

## 2018-10-04 NOTE — PROGRESS NOTE ADULT - SUBJECTIVE AND OBJECTIVE BOX
Patient is a 77y old  Female who presents with a chief complaint of septic shock (04 Oct 2018 10:19)      SUBJECTIVE / OVERNIGHT EVENTS:  No chest pain. No shortness of breath. No complaints. No events overnight.     MEDICATIONS  (STANDING):  acetaminophen   Tablet .. 650 milliGRAM(s) Oral every 6 hours  aspirin enteric coated 81 milliGRAM(s) Oral daily  buDESOnide 160 MICROgram(s)/formoterol 4.5 MICROgram(s) Inhaler 2 Puff(s) Inhalation two times a day  calcium carbonate 1250 mG  + Vitamin D (OsCal 500 + D) 1 Tablet(s) Oral three times a day  chlorhexidine 4% Liquid 1 Application(s) Topical <User Schedule>  Dakins Solution - 1/4 Strength 1 Application(s) Topical two times a day  gabapentin 300 milliGRAM(s) Oral three times a day  loratadine 10 milliGRAM(s) Oral daily  melatonin 3 milliGRAM(s) Oral at bedtime  metoprolol tartrate 12.5 milliGRAM(s) Oral two times a day  minocycline 100 milliGRAM(s) Oral two times a day  nystatin Powder 1 Application(s) Topical two times a day  pantoprazole    Tablet 40 milliGRAM(s) Oral before breakfast  polyethylene glycol 3350 17 Gram(s) Oral two times a day  predniSONE   Tablet 40 milliGRAM(s) Oral daily  simvastatin 20 milliGRAM(s) Oral at bedtime  tiotropium 18 MICROgram(s) Capsule 1 Capsule(s) Inhalation daily  vancomycin  IVPB 1000 milliGRAM(s) IV Intermittent every 24 hours    MEDICATIONS  (PRN):  senna 2 Tablet(s) Oral at bedtime PRN Constipation  simethicone 80 milliGRAM(s) Chew two times a day PRN Gas  traMADol 25 milliGRAM(s) Oral three times a day PRN Moderate Pain (4 - 6)      Vital Signs Last 24 Hrs  T(C): 36.7 (04 Oct 2018 08:16), Max: 36.9 (03 Oct 2018 16:19)  T(F): 98 (04 Oct 2018 08:16), Max: 98.4 (03 Oct 2018 16:19)  HR: 65 (04 Oct 2018 08:16) (62 - 72)  BP: 143/80 (04 Oct 2018 08:16) (143/80 - 158/80)  BP(mean): --  RR: 18 (04 Oct 2018 08:16) (18 - 18)  SpO2: 96% (04 Oct 2018 08:16) (95% - 100%)  CAPILLARY BLOOD GLUCOSE        I&O's Summary    03 Oct 2018 07:01  -  04 Oct 2018 07:00  --------------------------------------------------------  IN: 200 mL / OUT: 200 mL / NET: 0 mL        PHYSICAL EXAM:  GENERAL: NAD, well-developed  HEAD:  Atraumatic, Normocephalic  EYES: EOMI, PERRLA, conjunctiva and sclera clear  NECK: Supple, No JVD  CHEST/LUNG: Clear to auscultation bilaterally; No wheeze  HEART: Regular rate and rhythm; No murmurs, rubs, or gallops  ABDOMEN: Soft, Nontender, Nondistended; Bowel sounds present  EXTREMITIES:  2+ Peripheral Pulses, No clubbing, cyanosis, or edema  PSYCH: AAOx3  NEUROLOGY: non-focal  SKIN: No rashes or lesions    LABS:                        9.1    7.6   )-----------( 249      ( 04 Oct 2018 08:32 )             28.4     10-04    139  |  103  |  23  ----------------------------<  125<H>  4.8   |  29  |  0.66    Ca    8.5      04 Oct 2018 08:24    TPro  5.1<L>  /  Alb  2.8<L>  /  TBili  0.2  /  DBili  x   /  AST  11  /  ALT  15  /  AlkPhos  55  10-03    PT/INR - ( 04 Oct 2018 08:32 )   PT: 24.3 sec;   INR: 2.21 ratio         PTT - ( 04 Oct 2018 08:32 )  PTT:31.2 sec          RADIOLOGY & ADDITIONAL TESTS:    Imaging Personally Reviewed:    Consultant(s) Notes Reviewed:      Care Discussed with Consultants/Other Providers:

## 2018-10-04 NOTE — PROGRESS NOTE ADULT - ASSESSMENT
78yo female pmh HTN, CAD with PPM, recent admission for infected right knee hardware on minocycline by PICC at rehab center p/w multiple episodes of syncope, found to be hypotensive 60/40 by EMS, started on IVF by EMS, received 100cc prior to arrival, also hypoxic to low 90s on RA. Pt c/o dysuria and foul-smelling urine x 5 days, c/o dizziness. Denies fever, chills, chest pain, dyspnea, palpitations, dizziness, weakness, recent cough, nausea, vomiting, diarrhea, abdominal pain, BRBPR, melena, back pain, leg swelling.      septic shock coag negative staph bacteremia  - Repeat blood cultures NGTD  - iv vancomycin 1 g daily, cont until discharge  -  picc line removed  - ERIKA done, no vegitations  - ortho follows for knee    multiple wounds  - wound care consult following  - continue local wound care with wound vac per dr ramirez    anemia  - s/p transfusion 1 unit of prbc  - monitor cbc    poss pyoderma  - cw prednisone  - seen by  derm . will  taper of steroids, endo called    CAD  - c/w asa    lumbar disc disease  - cw Neurontin    copd  - cw Advair and Spiriva    GERD  - cw Protonix    dvt px  - on coumadin  - hold for inr over 3  pt eval    d/c planning today  - d/w case management  - waiting for bed delivery to pt's home.

## 2018-10-04 NOTE — CONSULT NOTE ADULT - ASSESSMENT
Assessment  High dose steroid use: 77y Female with no history of chronic steroid use, no history of hypoglycemic episode, had septic shock, she is on high dose oral steroids, feeling better, would need steroid taper and DC  Sepsis: On medications, stable, monitored.  CAD:  On meds, no active issues, stable.

## 2018-10-05 ENCOUNTER — TRANSCRIPTION ENCOUNTER (OUTPATIENT)
Age: 78
End: 2018-10-05

## 2018-10-05 VITALS — WEIGHT: 237.22 LBS

## 2018-10-05 LAB
INR BLD: 2.31 RATIO — HIGH (ref 0.88–1.16)
PROTHROM AB SERPL-ACNC: 25.4 SEC — HIGH (ref 9.8–12.7)

## 2018-10-05 PROCEDURE — 99232 SBSQ HOSP IP/OBS MODERATE 35: CPT

## 2018-10-05 RX ORDER — POLYETHYLENE GLYCOL 3350 17 G/17G
17 POWDER, FOR SOLUTION ORAL
Qty: 60 | Refills: 0 | OUTPATIENT
Start: 2018-10-05 | End: 2018-11-03

## 2018-10-05 RX ORDER — ACETAMINOPHEN 500 MG
2 TABLET ORAL
Qty: 0 | Refills: 0 | COMMUNITY
Start: 2018-10-05

## 2018-10-05 RX ORDER — ASPIRIN/CALCIUM CARB/MAGNESIUM 324 MG
1 TABLET ORAL
Qty: 30 | Refills: 0 | OUTPATIENT
Start: 2018-10-05 | End: 2018-11-03

## 2018-10-05 RX ORDER — TRAMADOL HYDROCHLORIDE 50 MG/1
0.5 TABLET ORAL
Qty: 15 | Refills: 0 | OUTPATIENT
Start: 2018-10-05 | End: 2018-10-14

## 2018-10-05 RX ORDER — SIMVASTATIN 20 MG/1
1 TABLET, FILM COATED ORAL
Qty: 30 | Refills: 0 | OUTPATIENT
Start: 2018-10-05 | End: 2018-11-03

## 2018-10-05 RX ORDER — FLUTICASONE PROPIONATE AND SALMETEROL 50; 250 UG/1; UG/1
1 POWDER ORAL; RESPIRATORY (INHALATION)
Qty: 0 | Refills: 0 | COMMUNITY

## 2018-10-05 RX ORDER — WARFARIN SODIUM 2.5 MG/1
2 TABLET ORAL ONCE
Qty: 0 | Refills: 0 | Status: COMPLETED | OUTPATIENT
Start: 2018-10-05 | End: 2018-10-05

## 2018-10-05 RX ORDER — PANTOPRAZOLE SODIUM 20 MG/1
1 TABLET, DELAYED RELEASE ORAL
Qty: 30 | Refills: 0 | OUTPATIENT
Start: 2018-10-05 | End: 2018-11-03

## 2018-10-05 RX ORDER — SIMETHICONE 80 MG/1
1 TABLET, CHEWABLE ORAL
Qty: 60 | Refills: 0 | OUTPATIENT
Start: 2018-10-05 | End: 2018-11-03

## 2018-10-05 RX ORDER — FOLIC ACID 0.8 MG
1 TABLET ORAL
Qty: 30 | Refills: 0 | OUTPATIENT
Start: 2018-10-05 | End: 2018-11-03

## 2018-10-05 RX ORDER — WARFARIN SODIUM 2.5 MG/1
0.5 TABLET ORAL
Qty: 0 | Refills: 0 | COMMUNITY

## 2018-10-05 RX ORDER — LANOLIN ALCOHOL/MO/W.PET/CERES
1 CREAM (GRAM) TOPICAL
Qty: 30 | Refills: 0 | OUTPATIENT
Start: 2018-10-05 | End: 2018-11-03

## 2018-10-05 RX ORDER — MAGNESIUM HYDROXIDE 400 MG/1
30 TABLET, CHEWABLE ORAL
Qty: 250 | Refills: 0 | OUTPATIENT
Start: 2018-10-05 | End: 2018-11-03

## 2018-10-05 RX ORDER — BUDESONIDE AND FORMOTEROL FUMARATE DIHYDRATE 160; 4.5 UG/1; UG/1
1 AEROSOL RESPIRATORY (INHALATION)
Qty: 1 | Refills: 0 | OUTPATIENT
Start: 2018-10-05 | End: 2018-11-03

## 2018-10-05 RX ORDER — GABAPENTIN 400 MG/1
1 CAPSULE ORAL
Qty: 90 | Refills: 0 | OUTPATIENT
Start: 2018-10-05 | End: 2018-11-03

## 2018-10-05 RX ORDER — ASCORBIC ACID 60 MG
1 TABLET,CHEWABLE ORAL
Qty: 30 | Refills: 0 | OUTPATIENT
Start: 2018-10-05 | End: 2018-11-03

## 2018-10-05 RX ORDER — MINOCYCLINE HYDROCHLORIDE 45 MG/1
1 TABLET, EXTENDED RELEASE ORAL
Qty: 60 | Refills: 0 | OUTPATIENT
Start: 2018-10-05 | End: 2018-11-03

## 2018-10-05 RX ORDER — LORATADINE 10 MG/1
1 TABLET ORAL
Qty: 30 | Refills: 0 | OUTPATIENT
Start: 2018-10-05 | End: 2018-11-03

## 2018-10-05 RX ORDER — WARFARIN SODIUM 2.5 MG/1
2 TABLET ORAL
Qty: 60 | Refills: 0 | OUTPATIENT
Start: 2018-10-05 | End: 2018-11-03

## 2018-10-05 RX ORDER — INFLUENZA VIRUS VACCINE 15; 15; 15; 15 UG/.5ML; UG/.5ML; UG/.5ML; UG/.5ML
0.5 SUSPENSION INTRAMUSCULAR ONCE
Qty: 0 | Refills: 0 | Status: DISCONTINUED | OUTPATIENT
Start: 2018-10-05 | End: 2018-10-05

## 2018-10-05 RX ORDER — SENNA PLUS 8.6 MG/1
2 TABLET ORAL
Qty: 60 | Refills: 0 | OUTPATIENT
Start: 2018-10-05 | End: 2018-11-03

## 2018-10-05 RX ADMIN — CHLORHEXIDINE GLUCONATE 1 APPLICATION(S): 213 SOLUTION TOPICAL at 07:53

## 2018-10-05 RX ADMIN — NYSTATIN CREAM 1 APPLICATION(S): 100000 CREAM TOPICAL at 06:34

## 2018-10-05 RX ADMIN — Medication 650 MILLIGRAM(S): at 06:33

## 2018-10-05 RX ADMIN — Medication 81 MILLIGRAM(S): at 11:26

## 2018-10-05 RX ADMIN — BUDESONIDE AND FORMOTEROL FUMARATE DIHYDRATE 2 PUFF(S): 160; 4.5 AEROSOL RESPIRATORY (INHALATION) at 06:34

## 2018-10-05 RX ADMIN — TRAMADOL HYDROCHLORIDE 25 MILLIGRAM(S): 50 TABLET ORAL at 06:33

## 2018-10-05 RX ADMIN — Medication 40 MILLIGRAM(S): at 06:33

## 2018-10-05 RX ADMIN — GABAPENTIN 300 MILLIGRAM(S): 400 CAPSULE ORAL at 15:17

## 2018-10-05 RX ADMIN — LORATADINE 10 MILLIGRAM(S): 10 TABLET ORAL at 11:26

## 2018-10-05 RX ADMIN — GABAPENTIN 300 MILLIGRAM(S): 400 CAPSULE ORAL at 06:33

## 2018-10-05 RX ADMIN — TRAMADOL HYDROCHLORIDE 25 MILLIGRAM(S): 50 TABLET ORAL at 11:24

## 2018-10-05 RX ADMIN — Medication 12.5 MILLIGRAM(S): at 06:33

## 2018-10-05 RX ADMIN — TRAMADOL HYDROCHLORIDE 25 MILLIGRAM(S): 50 TABLET ORAL at 11:54

## 2018-10-05 RX ADMIN — MINOCYCLINE HYDROCHLORIDE 100 MILLIGRAM(S): 45 TABLET, EXTENDED RELEASE ORAL at 06:33

## 2018-10-05 RX ADMIN — Medication 650 MILLIGRAM(S): at 11:26

## 2018-10-05 RX ADMIN — Medication 650 MILLIGRAM(S): at 11:56

## 2018-10-05 RX ADMIN — PANTOPRAZOLE SODIUM 40 MILLIGRAM(S): 20 TABLET, DELAYED RELEASE ORAL at 06:33

## 2018-10-05 RX ADMIN — WARFARIN SODIUM 2 MILLIGRAM(S): 2.5 TABLET ORAL at 08:50

## 2018-10-05 RX ADMIN — Medication 1 APPLICATION(S): at 06:34

## 2018-10-05 RX ADMIN — Medication 1 TABLET(S): at 06:33

## 2018-10-05 RX ADMIN — TIOTROPIUM BROMIDE 1 CAPSULE(S): 18 CAPSULE ORAL; RESPIRATORY (INHALATION) at 06:35

## 2018-10-05 NOTE — DISCHARGE NOTE ADULT - OTHER SIGNIFICANT FINDINGS
RLQ abdomen wound w/ moist & granular tissue no odor, kimber- nearly healed- linear 4cm x 0.3cm x 0.1cm      Rt anterior thigh wound w/ moist & granular tissue - kimber- nearly healed- irregular shaped linear partial thickness-disconnected     Rt Thigh superior posterolateral w/ new upper thigh 2 small wounds opened w/ serosanguinous drainage w/ increased granular tissue w/ scant fibrinous exudate & slough    Rt Thigh inferior posterolateral wound   increased moist & granular tissue   (+) serosanguinous drainage & tender w/o odor  No erythema, increased warmth, induration, fluctuance    Lt upper lateral thigh wound  full thickness ulcers w/ moist & granular tissue   (+)serosanguinous  drainage  (+)tender w/o malodor  No erythema, increased warmth, induration, fluctuance      Lt  Upper medial inner thigh   2 wounds/ now several pinpoint areas w/  2 partial thickness -   moist &granular   No drainage  (+)tender w/o odor  No erythema, increased warmth, induration, fluctuance  Wound care instructions : location abdominal pannus / bilateral thigh.... right and left anterior thigh and left lateral thigh  cleanse wounds with normal saline, pad dry , cavillon to periwound skin and aquacel to wounds cover with guaze and tegaderm daily   Rt lateral thigh wounds- VAC

## 2018-10-05 NOTE — PROGRESS NOTE ADULT - ASSESSMENT
76yo female pmh HTN, CAD with PPM, recent admission for infected right knee hardware on minocycline by PICC at rehab center p/w multiple episodes of syncope, found to be hypotensive 60/40 by EMS, started on IVF by EMS, received 100cc prior to arrival, also hypoxic to low 90s on RA. Pt c/o dysuria and foul-smelling urine x 5 days, c/o dizziness. Denies fever, chills, chest pain, dyspnea, palpitations, dizziness, weakness, recent cough, nausea, vomiting, diarrhea, abdominal pain, BRBPR, melena, back pain, leg swelling.      septic shock coag negative staph bacteremia  - Repeat blood cultures NGTD  - iv vancomycin 1 g daily, cont until discharge  -  picc line removed  - ERIKA done, no vegitations  - ortho follows for knee    multiple wounds  - wound care consult following  - continue local wound care with wound vac per dr ramirez    anemia  - s/p transfusion 1 unit of prbc  - monitor cbc    poss pyoderma  - cw prednisone  - seen by  derm . will slowly taper of steroids per endo    CAD  - c/w asa    lumbar disc disease  - cw Neurontin    copd  - cw Advair and Spiriva    GERD  - cw Protonix    dvt px  - on coumadin  - hold for inr over 3  pt eval    d/c planning today  - d/w case management

## 2018-10-05 NOTE — PROGRESS NOTE ADULT - SUBJECTIVE AND OBJECTIVE BOX
SUBJECTIVE: Pt seen, chart reviewed.  pt happy going home today  less pain- premedicated w/ tramadol  reminded to follow up as outpt  no c/o odor, redness, warmth  drainage managed by dressings  tolerating VAC    ROS skin see HPI  ALl other systems negative      aspirin enteric coated 81 milliGRAM(s) Oral daily  minocycline 100 milliGRAM(s) Oral two times a day  vancomycin  IVPB 1000 milliGRAM(s) IV Intermittent every 24 hours      Physical Exam:  Vital Signs Last 24 Hrs  T(C): 36.8 (05 Oct 2018 09:25), Max: 37.1 (05 Oct 2018 05:34)  T(F): 98.3 (05 Oct 2018 09:25), Max: 98.8 (05 Oct 2018 05:34)  HR: 92 (05 Oct 2018 09:25) (79 - 92)  BP: 142/81 (05 Oct 2018 09:25) (114/74 - 152/72)  BP(mean): --  RR: 18 (05 Oct 2018 09:25) (18 - 18)  SpO2: 93% (05 Oct 2018 09:25) (93% - 98%)  General Appearance:    Skin:  dry, frail,  ecchymosis w/o hematoma    pt is tender when just gently touching skin- therefore pt was premedicated prior to assessment'    Multiple wounds- see measurements in A&I sections- placed by wound PT & confirmed    RLQ abdomen wound w/ moist & granular tissue no odor  Rt anterior thigh wound w/ moist & granular tissue no odor  sliver  of a wound- healing  (+)serosanguinous drainage (+)tender  No erythema, odor,  increased warmth, induration, fluctuance    Lt lateral thigh wound  3cm x 4cmx x2cm   w/ moist and granular tissue   (+)serosanguinous drainage  (+)tender    no odor  No erythema, increased warmth, induration, fluctuance    Lt lateral posterior thigh (inferior)  Partial thickness moist granular wound  (+)serosanguinous drainage  No erythema, tenderness, odor, increased warmth, induration, fluctuance    Lt  Upper medial inner thigh   moist &granular almost healed  (+)scant serosanguinous drainage  (+)tender (+)no odor  No erythema, increased warmth, induration, fluctuance    Rt posterolateral  upper thigh   moist & granular tissue  no active bleeding  (+)tender     (+) serosanguinous drainage  No erythema, odor, increased warmth, induration, fluctuance    Rt lateral buttocks w/ecchymosis w/o hematoma  Rt lateral underpannus- partial thickness granular moisture wounds w/o signs of infection  scant serosanguinous drainage    No odor, erythema, increased warmth, tenderness, induration, fluctuance    LABS:                        9.1    7.6   )-----------( 249      ( 04 Oct 2018 08:32 )             28.4     PT/INR - ( 05 Oct 2018 09:24 )   PT: 25.4 sec;   INR: 2.31 ratio    PTT - ( 04 Oct 2018 08:32 )  PTT:31.2 sec    RADIOLOGY & ADDITIONAL STUDIES:    Culture - Blood (09.26.18 @ 21:00)    Gram Stain:   Growth in anaerobic bottle:  Gram Positive Cocci in Clusters  Growth in aerobic bottle:  Gram Positive Cocci in Clusters    Specimen Source: .Blood Blood    Culture Results:   Growth in aerobic and anaerobic bottles:  Staphylococcus epidermidis

## 2018-10-05 NOTE — DISCHARGE NOTE ADULT - MEDICATION SUMMARY - MEDICATIONS TO STOP TAKING
I will STOP taking the medications listed below when I get home from the hospital:    Coumadin 1 mg oral tablet  -- 0.5 tab(s) by mouth once a day (at bedtime)  adjust dose to keep INR between 2-3    predniSONE 50 mg oral tablet  -- 1 tab(s) by mouth once a day    sodium hypochlorite 0.125% topical solution  -- 1 application on skin 2 times a day  to R lateral thigh, irrigate with saline, pat dry, pack with dakin moistened gauze roll  cover with gauze, secure with tegaderm twice daily    betamethasone valerate 0.1% topical ointment  -- 1 application on skin 2 times a day

## 2018-10-05 NOTE — DISCHARGE NOTE ADULT - CARE PROVIDER_API CALL
Nate Bass), Orthopedics  [address] [facility]  [address]  [place] [address]  Phone: [phone]  Fax: [phone]    Andreea Hollins), Surgery; Vascular Surgery  [address]  [address]  [place] [address]  Phone: [phone]  Fax: [phone]    Sandie Zamudio), EndocrinologyMetabDiabetes; Internal Medicine  [address] [place] [address]  Phone: [phone]  Fax: [phone]

## 2018-10-05 NOTE — DISCHARGE NOTE ADULT - PLAN OF CARE
resolved with antibiotics pt received vancomycin  picc associated cns bacteremia now cleared and had picc pulled and zoe negative for vegetation. Pt to continue minocylcine for long term therapy as indicated by infectious disease 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 requiring I&D, TKA explant, static spacer placement with complex wound closure by plastic surgery complicated by wound healing problems and development of pyoderma granulosum      abx per ID, lifelong minocycline for chronic suppression  continue to spend majority of day oob to wheelchair, okay to ambulate but must remain 50% weightbearing on RLE with NO KNEE MOTION allowed, knee immobilizer at all times if OOB  F/u with ortho after dc continue aspirin and simvastatin continue ultram and neurontin. please follow up with your PMD for weaning and managment Continue coumadin and monitor PT and INR , last INR prior discharge 2.2. Continue steroids with tapering doses, please follow up with an endocrinologist Dr Zamudio after completion of steroids .

## 2018-10-05 NOTE — DISCHARGE NOTE ADULT - ADDITIONAL INSTRUCTIONS
follow up at the wound center as indicated below and make an appointment  Wound care instructions : location abdominal pannus / bilateral thigh.... right and left anterior thigh and left lateral thigh  cleanse wounds with normal saline, pad dry , cavillon to periwound skin and aquacel to wounds cover with guaze and tegaderm daily ....please also follow up with plastic surgery Dr. Jamey Olson   Rt lateral thigh wounds- VAC

## 2018-10-05 NOTE — PROGRESS NOTE ADULT - SUBJECTIVE AND OBJECTIVE BOX
Patient is a 77y old  Female who presents with a chief complaint of dysuria (04 Oct 2018 20:54)      SUBJECTIVE / OVERNIGHT EVENTS:  No chest pain. No shortness of breath. No complaints. No events overnight. hospital bed and wound vac to be delivered today.    MEDICATIONS  (STANDING):  acetaminophen   Tablet .. 650 milliGRAM(s) Oral every 6 hours  aspirin enteric coated 81 milliGRAM(s) Oral daily  buDESOnide 160 MICROgram(s)/formoterol 4.5 MICROgram(s) Inhaler 2 Puff(s) Inhalation two times a day  calcium carbonate 1250 mG  + Vitamin D (OsCal 500 + D) 1 Tablet(s) Oral three times a day  chlorhexidine 4% Liquid 1 Application(s) Topical <User Schedule>  Dakins Solution - 1/4 Strength 1 Application(s) Topical two times a day  gabapentin 300 milliGRAM(s) Oral three times a day  loratadine 10 milliGRAM(s) Oral daily  melatonin 3 milliGRAM(s) Oral at bedtime  metoprolol tartrate 12.5 milliGRAM(s) Oral two times a day  minocycline 100 milliGRAM(s) Oral two times a day  nystatin Powder 1 Application(s) Topical two times a day  pantoprazole    Tablet 40 milliGRAM(s) Oral before breakfast  polyethylene glycol 3350 17 Gram(s) Oral two times a day  predniSONE   Tablet 40 milliGRAM(s) Oral daily  simvastatin 20 milliGRAM(s) Oral at bedtime  tiotropium 18 MICROgram(s) Capsule 1 Capsule(s) Inhalation daily  vancomycin  IVPB 1000 milliGRAM(s) IV Intermittent every 24 hours    MEDICATIONS  (PRN):  senna 2 Tablet(s) Oral at bedtime PRN Constipation  simethicone 80 milliGRAM(s) Chew two times a day PRN Gas  traMADol 25 milliGRAM(s) Oral three times a day PRN Moderate Pain (4 - 6)      Vital Signs Last 24 Hrs  T(C): 36.8 (05 Oct 2018 09:25), Max: 37.1 (05 Oct 2018 05:34)  T(F): 98.3 (05 Oct 2018 09:25), Max: 98.8 (05 Oct 2018 05:34)  HR: 92 (05 Oct 2018 09:25) (79 - 92)  BP: 142/81 (05 Oct 2018 09:25) (114/74 - 152/72)  BP(mean): --  RR: 18 (05 Oct 2018 09:25) (18 - 18)  SpO2: 93% (05 Oct 2018 09:25) (93% - 98%)  CAPILLARY BLOOD GLUCOSE        I&O's Summary    04 Oct 2018 07:01  -  05 Oct 2018 07:00  --------------------------------------------------------  IN: 380 mL / OUT: 0 mL / NET: 380 mL        PHYSICAL EXAM:  GENERAL: NAD, well-developed obese  HEAD:  Atraumatic, Normocephalic  EYES: EOMI, PERRLA, conjunctiva and sclera clear  NECK: Supple, No JVD  CHEST/LUNG: Clear to auscultation bilaterally; No wheeze  HEART: Regular rate and rhythm; No murmurs, rubs, or gallops  ABDOMEN: Soft, Nontender, Nondistended; Bowel sounds present  EXTREMITIES:  2+ Peripheral Pulses, No clubbing, cyanosis, or edema  PSYCH: AAOx3  NEUROLOGY: non-focal, generalized weakness  SKIN: No rashes or lesions    LABS:                        9.1    7.6   )-----------( 249      ( 04 Oct 2018 08:32 )             28.4     10-04    139  |  103  |  23  ----------------------------<  125<H>  4.8   |  29  |  0.66    Ca    8.5      04 Oct 2018 08:24      PT/INR - ( 05 Oct 2018 09:24 )   PT: 25.4 sec;   INR: 2.31 ratio         PTT - ( 04 Oct 2018 08:32 )  PTT:31.2 sec          RADIOLOGY & ADDITIONAL TESTS:    Imaging Personally Reviewed:    Consultant(s) Notes Reviewed:      Care Discussed with Consultants/Other Providers:

## 2018-10-05 NOTE — PROGRESS NOTE ADULT - REASON FOR ADMISSION
dysuria
dysuria, bacteremia, anemia
dysuria, hypotension
septic shock
dysuria
sepsis
dysuria

## 2018-10-05 NOTE — DISCHARGE NOTE ADULT - SECONDARY DIAGNOSIS.
Knee joint replacement status, right CAD (coronary artery disease) Chronic pain disorder Pulmonary emboli Pyoderma (skin infection)

## 2018-10-05 NOTE — PROGRESS NOTE ADULT - SUBJECTIVE AND OBJECTIVE BOX
Chief complaint  Patient is a 77y old  Female who presents with a chief complaint of dysuria (05 Oct 2018 11:08)   Review of systems  Patient in bed, looks comfortable, no fever, no hypoglycemia.    Labs and Fingersticks  CAPILLARY BLOOD GLUCOSE          Anion Gap, Serum: 7 (10-04 @ 08:24)      Calcium, Total Serum: 8.5 (10-04 @ 08:24)          10-04    139  |  103  |  23  ----------------------------<  125<H>  4.8   |  29  |  0.66    Ca    8.5      04 Oct 2018 08:24                          9.1    7.6   )-----------( 249      ( 04 Oct 2018 08:32 )             28.4     Medications  MEDICATIONS  (STANDING):  acetaminophen   Tablet .. 650 milliGRAM(s) Oral every 6 hours  aspirin enteric coated 81 milliGRAM(s) Oral daily  buDESOnide 160 MICROgram(s)/formoterol 4.5 MICROgram(s) Inhaler 2 Puff(s) Inhalation two times a day  calcium carbonate 1250 mG  + Vitamin D (OsCal 500 + D) 1 Tablet(s) Oral three times a day  chlorhexidine 4% Liquid 1 Application(s) Topical <User Schedule>  Dakins Solution - 1/4 Strength 1 Application(s) Topical two times a day  gabapentin 300 milliGRAM(s) Oral three times a day  loratadine 10 milliGRAM(s) Oral daily  melatonin 3 milliGRAM(s) Oral at bedtime  metoprolol tartrate 12.5 milliGRAM(s) Oral two times a day  minocycline 100 milliGRAM(s) Oral two times a day  nystatin Powder 1 Application(s) Topical two times a day  pantoprazole    Tablet 40 milliGRAM(s) Oral before breakfast  polyethylene glycol 3350 17 Gram(s) Oral two times a day  predniSONE   Tablet 40 milliGRAM(s) Oral daily  simvastatin 20 milliGRAM(s) Oral at bedtime  tiotropium 18 MICROgram(s) Capsule 1 Capsule(s) Inhalation daily  vancomycin  IVPB 1000 milliGRAM(s) IV Intermittent every 24 hours      Physical Exam  General: Patient comfortable in bed  Vital Signs Last 12 Hrs  T(F): 98.3 (10-05-18 @ 09:25), Max: 98.8 (10-05-18 @ 05:34)  HR: 92 (10-05-18 @ 09:25) (86 - 92)  BP: 142/81 (10-05-18 @ 09:25) (142/81 - 152/72)  BP(mean): --  RR: 18 (10-05-18 @ 09:25) (18 - 18)  SpO2: 93% (10-05-18 @ 09:25) (93% - 98%)  Neck: No palpable thyroid nodules.  CVS: S1S2, No murmurs  Respiratory: No wheezing, no crepitations  GI: Abdomen soft, bowel sounds positive  Musculoskeletal:  edema lower extremities.   Skin: No skin rashes, no ecchymosis    Diagnostics

## 2018-10-05 NOTE — PROGRESS NOTE ADULT - ASSESSMENT
Assessment  High dose steroid use: 77y Female with no history of chronic steroid use, no history of hypoglycemic episode, had septic shock, she is on high dose oral steroids, feeling better, primary team wants to start steroid taper and DC  Sepsis: On medications, stable, monitored.  CAD:  On meds, no active issues, stable.

## 2018-10-05 NOTE — DISCHARGE NOTE ADULT - PATIENT PORTAL LINK FT
You can access the [facility] Patient Portal, offered by [facility], by registering with the following website: [email or web address]

## 2018-10-05 NOTE — DISCHARGE NOTE ADULT - CARE PLAN
Principal Discharge DX:	Bacteremia associated with intravascular line, initial encounter  Goal:	resolved with antibiotics  Assessment and plan of treatment:	pt received vancomycin  picc associated cns bacteremia now cleared and had picc pulled and zoe negative for vegetation. 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 requiring I&D, TKA explant, static spacer placement with complex wound closure by plastic surgery complicated by wound healing problems and development of pyoderma granulosum      abx per ID, lifelong minocycline for chronic suppression  continue to spend majority of day oob to wheelchair, okay to ambulate but must remain 50% weightbearing on RLE with NO KNEE MOTION allowed, knee immobilizer at all times if OOB  F/u with ortho after dc  Secondary Diagnosis:	CAD (coronary artery disease)  Assessment and plan of treatment:	continue aspirin and simvastatin  Secondary Diagnosis:	Chronic pain disorder  Assessment and plan of treatment:	continue ultram and neurontin. please follow up with your PMD for weaning and managment  Secondary Diagnosis:	Pulmonary emboli  Assessment and plan of treatment:	Continue coumadin and monitor PT and INR , last INR prior discharge 2.2.  Secondary Diagnosis:	Pyoderma (skin infection)  Assessment and plan of treatment:	Continue steroids with tapering doses, please follow up with an endocrinologist Dr Zamudio after completion of steroids .

## 2018-10-05 NOTE — PROGRESS NOTE ADULT - ASSESSMENT
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    Multiple wounds Abdomen Bilateral thighs w/ new wounds on buttocks- no gross signs of infection    VAC to RLE wounds     Aquacel to all other wounds  Rt knee surgical wound w/ skin dehiscence - per plastics and ortho  Con't steroids for PG  COnsider Pain Consult to assist w/ management- pt is growing a tolerance to pain Rx  Abx per Medicine/ ID  con't offloading  con't Nuturition  con't Pericare  Con't per Medicine  F/u as outpatient in Wound Center [facility] [phone]  d/w team & attending  Janki Cramer PA-C [id number]  I spent  25 minutes w/ this pt of which more than 50% of the time was spent counseling & coordinating care of this pt.

## 2018-10-05 NOTE — DISCHARGE NOTE ADULT - DURABLE MEDICAL EQUIPMENT AGENCY
Hospital Bed was ordered through [facility] ph#[phone]. Wound Vac was ordered through [facility], ph# [phone]

## 2018-10-05 NOTE — PROGRESS NOTE ADULT - PROBLEM SELECTOR PLAN 1
May start tapering down steroids and DC, patient would need early FU. if symptomatic return to ER would need stim test to r/o adrenal insufficiency.

## 2018-10-05 NOTE — DISCHARGE NOTE ADULT - DISCHARGE DATE
HPI


Chief Complaint:  GI Complaint


Time Seen by Provider:  09:15


Travel History


International Travel<30 days:  No


Contact w/Intl Traveler<30days:  No


Traveled to known affect area:  No





History of Present Illness


HPI


Patient is a 4 month 20-day-old male here with his father, grandmother and 

family friend for evaluation of blood in the diapers.  Patient was referred 

here by PCP Dr. Vijay Segura.  Patient was apparently fine yesterday.  Today he 

developed diarrhea 2 followed by 2 diapers filled with bright red blood.  

Since arrival from PCPs office he had another diaper containing bright red 

blood.  Today he has been pale and was grunting.  Grunting has resolved but he 

is still pale and less active.  He has one episode of emesis today that 

appeared to be yellow fluid without blood.  There has been no fever, cough, 

runny nose.  He has no rashes.  He has no eye redness or eye drainage.  His 

appetite was normal yesterday.  He is on Enfamil Premium.  He started sweet 

potatoes in the last week.  His urine output had been normal.  He was admitted 

to NICU here at birth due to tachypnea.  He was treated with NCPAP and IV 

antibiotics x 36 hours.  Symptoms resolved.  Cultures were negative.





History


Past Medical History


Medical History:  Denies Significant Hx


Immunizations Current:  Yes


Tetanus Vaccination:  < 5 Years





Past Surgical History


Surgical History:  No Previous Surgery





Social History


Attends:  


Tobacco Use in Home:  No





Allergies-Medications


(Allergen,Severity, Reaction):  


Coded Allergies:  


     No Known Allergies (Unverified  Adverse Reaction, Unknown, 1/8/18)


Reported Meds & Prescriptions





Reported Meds & Active Scripts


Active


No Active Prescriptions or Reported Medications    








ROS


Except as stated in HPI:  all other systems reviewed are Neg





Physical Exam


Narrative


GENERAL APPEARANCE: The patient is a well-developed, well-nourished child in no 

acute distress. He is pale but awake and alert.   


SKIN: Skin is warm and dry without rashes. There is good turgor. No tenting.


HEENT: Anterior fontanelle is open and flat. Throat is clear without erythema, 

swelling or exudate. Uvula is midline. Mucous membranes are moist. Airway is 

patent. The pupils are equal, round and reactive to light. Extraocular motions 

are intact. No drainage or injection. Both tympanic membranes are without 

erythema, dullness or loss of landmarks. No perforation. No nasal congestion.


NECK: Supple and nontender with full range of motion without discomfort. No 

meningeal signs. 


LUNGS: Good air entry bilaterally with equal breath sounds without wheezes, 

rales or rhonchi.


CHEST: The chest wall is without retractions or use of accessory muscles.


HEART: Regular rate and rhythm without murmur.


ABDOMEN: Soft, nondistended, nontender with positive active bowel sounds. No 

guarding. No masses, no hepatosplenomegaly.


EXTREMITIES: Full range of motion of all extremities is present. No cyanosis. 

Capillary refill is less than 2 seconds.


NEUROLOGIC: Awake, alert, quiet, symmetric movements.


RECTUM: ? fissure at 6 o'clock position. No bleeding.


: Normal male genitalia. Testes are down bilaterally. Penis is slightly 

buried within fat pad but appears normal.





Data


Data


Last Documented VS





Vital Signs








  Date Time  Temp Pulse Resp B/P (MAP) Pulse Ox O2 Delivery O2 Flow Rate FiO2


 


1/8/18 11:05        


 


1/8/18 09:04 97.5 130 34  100   








Orders





 Orders


Complete Blood Count With Diff (1/8/18 09:36)


Comprehensive Metabolic Panel (1/8/18 09:36)


C-Reactive Protein (Crp) (1/8/18 09:36)


Iv Access Insert/Monitor (1/8/18 09:36)


Sodium Chlor 0.9% 250 Ml Inj (Ns 250 Ml (1/8/18 09:45)


Ondansetron Inj (Zofran Inj) (1/8/18 09:45)





Labs





Laboratory Tests








Test


  1/8/18


09:50


 


White Blood Count 9.7 TH/MM3 


 


Red Blood Count 4.48 MIL/MM3 


 


Hemoglobin 12.0 GM/DL 


 


Hematocrit 35.2 % 


 


Mean Corpuscular Volume 78.5 FL 


 


Mean Corpuscular Hemoglobin 26.7 PG 


 


Mean Corpuscular Hemoglobin


Concent 34.0 % 


 


 


Red Cell Distribution Width 11.9 % 


 


Platelet Count 403 TH/MM3 


 


Mean Platelet Volume 7.0 FL 


 


Neutrophils (%) (Auto) 71.5 % 


 


Lymphocytes (%) (Auto) 20.5 % 


 


Monocytes (%) (Auto) 6.7 % 


 


Eosinophils (%) (Auto) 0.7 % 


 


Basophils (%) (Auto) 0.6 % 


 


Neutrophils # (Auto) 6.9 TH/MM3 


 


Lymphocytes # (Auto) 2.0 TH/MM3 


 


Monocytes # (Auto) 0.7 TH/MM3 


 


Eosinophils # (Auto) 0.1 TH/MM3 


 


Basophils # (Auto) 0.1 TH/MM3 


 


CBC Comment DIFF FINAL 


 


Differential Comment  


 


Hematology Comments  


 


Blood Urea Nitrogen 9 MG/DL 


 


Creatinine 0.19 MG/DL 


 


Random Glucose 141 MG/DL 


 


Total Protein 6.2 GM/DL 


 


Albumin 3.9 GM/DL 


 


Calcium Level 9.4 MG/DL 


 


Alkaline Phosphatase 291 U/L 


 


Aspartate Amino Transf


(AST/SGOT) 33 U/L 


 


 


Alanine Aminotransferase


(ALT/SGPT) 38 U/L 


 


 


Total Bilirubin 0.3 MG/DL 


 


Sodium Level 139 MEQ/L 


 


Potassium Level 4.4 MEQ/L 


 


Chloride Level 107 MEQ/L 


 


Carbon Dioxide Level 25.8 MEQ/L 


 


Anion Gap 6 MEQ/L 


 


C-Reactive Protein 0.42 MG/DL 











MDM


Medical Decision Making


Medical Screen Exam Complete:  Yes


Emergency Medical Condition:  Yes


Medical Record Reviewed:  Yes


Interpretation(s)


WBC count is normal.


Hgb is normal.


PLT count is normal.


CMP is normal except for mildly elevated glucose likely due to stress response.


CRP is minimally elevated.


Differential Diagnosis


Intussusception, milk protein allergy, sweet potato allergy, polyp, Meckel's 

diverticulum, intestinal hemangioma, gastroenteritis


Narrative Course


4 month 20-day-old male with bright red blood per rectum and clinical 

presentation concerning for intussusception.  He is pale but awake alert with 

stable vital signs.  Due to need for further evaluation for intussusception 

which is not available at our institution, he is being transferred to [facility].





9:25 AM I spoke with [facility].  I spoke with pediatric 

surgeon Dr. Hernandez who has accepted transfer ER to ER.





9:30 AM I spoke with Dr. Vijay Segura to inform him of above.





Mother arrived in the ER.  Family is comfortable with plan.





HemaPrompt Point of Care


Internal Pos. & Neg. Controls:  Passed


Fecal Specimen Occult Blood:  Positive


Physician Communication


See above





Diagnosis





 Primary Impression:  


 Intussusception


 Additional Impression:  


 GI bleeding


 Qualified Codes:  K92.2 - Gastrointestinal hemorrhage, unspecified


Scripts


No Active Prescriptions or Reported Meds


Disposition:  70 TRANSFER TO OTHER FACILITY


Condition:  Stable





__________________________________________________


Primary Care Physician


Vijay Segura MD


Parent/guardian confirms PCP:  gives consent to fax note to PCP











Bernie Talbot MD Jan 8, 2018 09:36
05-Oct-2018

## 2018-10-05 NOTE — DISCHARGE NOTE ADULT - HOSPITAL COURSE
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 Pt was discharged to rehab after a long prolonged stay in hospital for treatment of multiple wounds complicated with pyroderma . Pattient presented in the ER with  hypotensive event, concerns of urosepsis but urine culture negative.  Coag negative staph in blood, PICC removed on 9/28. ERIKA negative and blood culture after PICC removal negative. Pt received last dose of vancomycin on 10/5/18 and converted back to minocycline 100 mgs 2x a day.  Pt will follow up with wound care center for managment of multiple wounds, Orthopedics for post knee replacement and endocrinology and or internist for after completion of steroids and monitor for adrenal insufficiency.

## 2018-10-05 NOTE — DISCHARGE NOTE ADULT - MEDICATION SUMMARY - MEDICATIONS TO TAKE
I will START or STAY ON the medications listed below when I get home from the hospital:    Semielectric hospital bed and alternating pressure pad  -- Indication: For decubitus ulcer    predniSONE 10 mg oral tablet  -- 2 tab(s) by mouth once a day for 2 days  1 tab by mouth once a day for 2 days  0.5 tab by mouth once a day for 2 days then d/c  -- It is very important that you take or use this exactly as directed.  Do not skip doses or discontinue unless directed by your doctor.  Obtain medical advice before taking any non-prescription drugs as some may affect the action of this medication.  Take with food or milk.    -- Indication: For Pyoderma (skin infection)    aspirin 81 mg oral delayed release tablet  -- 1 tab(s) by mouth once a day  -- Indication: For CAD (coronary artery disease)    acetaminophen 325 mg oral tablet  -- 2 tab(s) by mouth every 6 hours  -- Indication: For Pain    traMADol 50 mg oral tablet  -- 0.5 tab(s) by mouth 3 times a day, As needed, Moderate Pain (4 - 6) MDD:.75 mgs  -- Indication: For moderate pain    magnesium hydroxide 8% oral suspension  -- 30 milliliter(s) by mouth once a day, As needed, Constipation  -- Indication: For Constiaption    Coumadin 1 mg oral tablet  -- 2 tab(s) by mouth once a day (at bedtime)  adjust dose to keep INR between 2-3   -- Indication: For Pulmonary emboli    gabapentin 300 mg oral capsule  -- 1 cap(s) by mouth 3 times a day  -- Indication: For Pain    loratadine 10 mg oral tablet  -- 1 tab(s) by mouth once a day  -- Indication: For Allergies    simvastatin 20 mg oral tablet  -- 1 tab(s) by mouth once a day (at bedtime)  -- Indication: For high cholesterol    metoprolol  -- 12.5 milligram(s) by mouth 2 times a day  -- Indication: For htn    budesonide-formoterol 160 mcg-4.5 mcg/inh inhalation aerosol  -- 1 inhaler(s) inhaled 2 times a day   -- Indication: For Asthma    polyethylene glycol 3350 oral powder for reconstitution  -- 17 gram(s) by mouth 2 times a day..hold if loose BM  -- Indication: For Constiaption    senna oral tablet  -- 2 tab(s) by mouth once a day (at bedtime), As needed, Constipation  -- Indication: For Constipation    simethicone 80 mg oral tablet, chewable  -- 1 tab(s) by mouth 2 times a day, As needed, Gas  -- Indication: For gas     melatonin 3 mg oral tablet  -- 1 tab(s) by mouth once a day (at bedtime)  -- Indication: For insomina    pantoprazole 40 mg oral delayed release tablet  -- 1 tab(s) by mouth once a day (before a meal)  -- Indication: For gerd    minocycline 100 mg oral capsule  -- 1 cap(s) by mouth 2 times a day  -- Indication: For Pyoderma (skin infection)    calcium-vitamin D 500 mg-200 intl units oral tablet  -- 1 tab(s) by mouth 3 times a day  -- Indication: For supplement    Multiple Vitamins oral tablet  -- 1 tab(s) by mouth once a day  -- Indication: For supplement    ascorbic acid 500 mg oral tablet  -- 1 tab(s) by mouth once a day  -- Indication: For supplement    folic acid 1 mg oral tablet  -- 1 tab(s) by mouth once a day  -- Indication: For supplement

## 2018-10-05 NOTE — DISCHARGE NOTE ADULT - CARE PROVIDERS DIRECT ADDRESSES
[email or web address]

## 2018-10-11 NOTE — PROGRESS NOTE ADULT - PROBLEM/PLAN-6
DISPLAY PLAN FREE TEXT
alone

## 2018-10-17 ENCOUNTER — RX RENEWAL (OUTPATIENT)
Age: 78
End: 2018-10-17

## 2018-10-17 PROBLEM — Z87.39 HISTORY OF INFECTION OF TOTAL JOINT PROSTHESIS OF KNEE: Status: ACTIVE | Noted: 2018-03-06

## 2018-10-18 ENCOUNTER — CHART COPY (OUTPATIENT)
Age: 78
End: 2018-10-18

## 2018-10-22 ENCOUNTER — INPATIENT (INPATIENT)
Facility: HOSPITAL | Age: 78
LOS: 6 days | Discharge: NURSING, REHAB OR LONG-TERM CARE | DRG: 603 | End: 2018-10-29
Attending: INTERNAL MEDICINE | Admitting: INTERNAL MEDICINE
Payer: MEDICARE

## 2018-10-22 VITALS
HEIGHT: 64 IN | HEART RATE: 108 BPM | OXYGEN SATURATION: 97 % | DIASTOLIC BLOOD PRESSURE: 80 MMHG | WEIGHT: 227.08 LBS | RESPIRATION RATE: 28 BRPM | TEMPERATURE: 101 F | SYSTOLIC BLOOD PRESSURE: 131 MMHG

## 2018-10-22 DIAGNOSIS — I25.10 ATHEROSCLEROTIC HEART DISEASE OF NATIVE CORONARY ARTERY WITHOUT ANGINA PECTORIS: ICD-10-CM

## 2018-10-22 DIAGNOSIS — E66.01 MORBID (SEVERE) OBESITY DUE TO EXCESS CALORIES: ICD-10-CM

## 2018-10-22 DIAGNOSIS — T14.8XXA OTHER INJURY OF UNSPECIFIED BODY REGION, INITIAL ENCOUNTER: ICD-10-CM

## 2018-10-22 DIAGNOSIS — Z29.9 ENCOUNTER FOR PROPHYLACTIC MEASURES, UNSPECIFIED: ICD-10-CM

## 2018-10-22 DIAGNOSIS — Z95.2 PRESENCE OF PROSTHETIC HEART VALVE: Chronic | ICD-10-CM

## 2018-10-22 DIAGNOSIS — K21.9 GASTRO-ESOPHAGEAL REFLUX DISEASE WITHOUT ESOPHAGITIS: ICD-10-CM

## 2018-10-22 DIAGNOSIS — Z95.0 PRESENCE OF CARDIAC PACEMAKER: Chronic | ICD-10-CM

## 2018-10-22 DIAGNOSIS — Z87.39 PERSONAL HISTORY OF OTHER DISEASES OF THE MUSCULOSKELETAL SYSTEM AND CONNECTIVE TISSUE: ICD-10-CM

## 2018-10-22 DIAGNOSIS — Z86.718 PERSONAL HISTORY OF OTHER VENOUS THROMBOSIS AND EMBOLISM: ICD-10-CM

## 2018-10-22 DIAGNOSIS — Z98.890 OTHER SPECIFIED POSTPROCEDURAL STATES: Chronic | ICD-10-CM

## 2018-10-22 DIAGNOSIS — R65.10 SYSTEMIC INFLAMMATORY RESPONSE SYNDROME (SIRS) OF NON-INFECTIOUS ORIGIN WITHOUT ACUTE ORGAN DYSFUNCTION: ICD-10-CM

## 2018-10-22 DIAGNOSIS — I50.32 CHRONIC DIASTOLIC (CONGESTIVE) HEART FAILURE: ICD-10-CM

## 2018-10-22 DIAGNOSIS — I35.0 NONRHEUMATIC AORTIC (VALVE) STENOSIS: ICD-10-CM

## 2018-10-22 DIAGNOSIS — Z96.659 PRESENCE OF UNSPECIFIED ARTIFICIAL KNEE JOINT: Chronic | ICD-10-CM

## 2018-10-22 LAB
ALBUMIN SERPL ELPH-MCNC: 2 G/DL — LOW (ref 3.3–5)
ALP SERPL-CCNC: 107 U/L — SIGNIFICANT CHANGE UP (ref 40–120)
ALT FLD-CCNC: 18 U/L — SIGNIFICANT CHANGE UP (ref 10–45)
ANION GAP SERPL CALC-SCNC: 12 MMOL/L — SIGNIFICANT CHANGE UP (ref 5–17)
ANISOCYTOSIS BLD QL: SIGNIFICANT CHANGE UP
APPEARANCE UR: CLEAR — SIGNIFICANT CHANGE UP
APTT BLD: 41.5 SEC — HIGH (ref 27.5–37.4)
AST SERPL-CCNC: 20 U/L — SIGNIFICANT CHANGE UP (ref 10–40)
BACTERIA # UR AUTO: NEGATIVE — SIGNIFICANT CHANGE UP
BASOPHILS # BLD AUTO: 0.1 K/UL — SIGNIFICANT CHANGE UP (ref 0–0.2)
BILIRUB SERPL-MCNC: 0.3 MG/DL — SIGNIFICANT CHANGE UP (ref 0.2–1.2)
BILIRUB UR-MCNC: NEGATIVE — SIGNIFICANT CHANGE UP
BUN SERPL-MCNC: 11 MG/DL — SIGNIFICANT CHANGE UP (ref 7–23)
CALCIUM SERPL-MCNC: 8.9 MG/DL — SIGNIFICANT CHANGE UP (ref 8.4–10.5)
CHLORIDE SERPL-SCNC: 106 MMOL/L — SIGNIFICANT CHANGE UP (ref 96–108)
CO2 SERPL-SCNC: 22 MMOL/L — SIGNIFICANT CHANGE UP (ref 22–31)
COLOR SPEC: YELLOW — SIGNIFICANT CHANGE UP
CREAT SERPL-MCNC: 0.6 MG/DL — SIGNIFICANT CHANGE UP (ref 0.5–1.3)
DIFF PNL FLD: NEGATIVE — SIGNIFICANT CHANGE UP
ELLIPTOCYTES BLD QL SMEAR: SLIGHT — SIGNIFICANT CHANGE UP
EOSINOPHIL # BLD AUTO: 0.1 K/UL — SIGNIFICANT CHANGE UP (ref 0–0.5)
EOSINOPHIL NFR BLD AUTO: 1 % — SIGNIFICANT CHANGE UP (ref 0–6)
EPI CELLS # UR: 2 /HPF — SIGNIFICANT CHANGE UP
GLUCOSE SERPL-MCNC: 97 MG/DL — SIGNIFICANT CHANGE UP (ref 70–99)
GLUCOSE UR QL: NEGATIVE — SIGNIFICANT CHANGE UP
HCT VFR BLD CALC: 30.5 % — LOW (ref 34.5–45)
HGB BLD-MCNC: 9.8 G/DL — LOW (ref 11.5–15.5)
HYALINE CASTS # UR AUTO: 2 /LPF — SIGNIFICANT CHANGE UP (ref 0–2)
INR BLD: 1.83 RATIO — HIGH (ref 0.88–1.16)
KETONES UR-MCNC: NEGATIVE — SIGNIFICANT CHANGE UP
LACTATE BLDV-MCNC: 1.9 MMOL/L — SIGNIFICANT CHANGE UP (ref 0.7–2)
LEUKOCYTE ESTERASE UR-ACNC: NEGATIVE — SIGNIFICANT CHANGE UP
LYMPHOCYTES # BLD AUTO: 3.4 K/UL — HIGH (ref 1–3.3)
LYMPHOCYTES # BLD AUTO: 34 % — SIGNIFICANT CHANGE UP (ref 13–44)
MCHC RBC-ENTMCNC: 27.4 PG — SIGNIFICANT CHANGE UP (ref 27–34)
MCHC RBC-ENTMCNC: 32 GM/DL — SIGNIFICANT CHANGE UP (ref 32–36)
MCV RBC AUTO: 85.5 FL — SIGNIFICANT CHANGE UP (ref 80–100)
MICROCYTES BLD QL: SLIGHT — SIGNIFICANT CHANGE UP
MONOCYTES # BLD AUTO: 0.7 K/UL — SIGNIFICANT CHANGE UP (ref 0–0.9)
MONOCYTES NFR BLD AUTO: 2 % — SIGNIFICANT CHANGE UP (ref 2–14)
NEUTROPHILS # BLD AUTO: 6.9 K/UL — SIGNIFICANT CHANGE UP (ref 1.8–7.4)
NEUTROPHILS NFR BLD AUTO: 62 % — SIGNIFICANT CHANGE UP (ref 43–77)
NITRITE UR-MCNC: NEGATIVE — SIGNIFICANT CHANGE UP
NRBC # BLD: 2 /100 — HIGH (ref 0–0)
OVALOCYTES BLD QL SMEAR: SLIGHT — SIGNIFICANT CHANGE UP
PH UR: 5.5 — SIGNIFICANT CHANGE UP (ref 5–8)
PLAT MORPH BLD: NORMAL — SIGNIFICANT CHANGE UP
PLATELET # BLD AUTO: 385 K/UL — SIGNIFICANT CHANGE UP (ref 150–400)
POIKILOCYTOSIS BLD QL AUTO: SLIGHT — SIGNIFICANT CHANGE UP
POLYCHROMASIA BLD QL SMEAR: SLIGHT — SIGNIFICANT CHANGE UP
POTASSIUM SERPL-MCNC: 4.6 MMOL/L — SIGNIFICANT CHANGE UP (ref 3.5–5.3)
POTASSIUM SERPL-SCNC: 4.6 MMOL/L — SIGNIFICANT CHANGE UP (ref 3.5–5.3)
PROT SERPL-MCNC: 6.1 G/DL — SIGNIFICANT CHANGE UP (ref 6–8.3)
PROT UR-MCNC: ABNORMAL
PROTHROM AB SERPL-ACNC: 20.2 SEC — HIGH (ref 9.8–12.7)
RAPID RVP RESULT: SIGNIFICANT CHANGE UP
RBC # BLD: 3.56 M/UL — LOW (ref 3.8–5.2)
RBC # FLD: 19.9 % — HIGH (ref 10.3–14.5)
RBC BLD AUTO: ABNORMAL
RBC CASTS # UR COMP ASSIST: 4 /HPF — SIGNIFICANT CHANGE UP (ref 0–4)
SODIUM SERPL-SCNC: 140 MMOL/L — SIGNIFICANT CHANGE UP (ref 135–145)
SP GR SPEC: 1.02 — SIGNIFICANT CHANGE UP (ref 1.01–1.02)
STOMATOCYTES BLD QL SMEAR: PRESENT — SIGNIFICANT CHANGE UP
UROBILINOGEN FLD QL: NEGATIVE — SIGNIFICANT CHANGE UP
VARIANT LYMPHS # BLD: 1 % — SIGNIFICANT CHANGE UP (ref 0–6)
WBC # BLD: 10.9 K/UL — HIGH (ref 3.8–10.5)
WBC # FLD AUTO: 10.9 K/UL — HIGH (ref 3.8–10.5)
WBC UR QL: 2 /HPF — SIGNIFICANT CHANGE UP (ref 0–5)

## 2018-10-22 PROCEDURE — 99222 1ST HOSP IP/OBS MODERATE 55: CPT

## 2018-10-22 PROCEDURE — 71045 X-RAY EXAM CHEST 1 VIEW: CPT | Mod: 26

## 2018-10-22 PROCEDURE — 99284 EMERGENCY DEPT VISIT MOD MDM: CPT | Mod: 25

## 2018-10-22 PROCEDURE — 93010 ELECTROCARDIOGRAM REPORT: CPT

## 2018-10-22 PROCEDURE — 99223 1ST HOSP IP/OBS HIGH 75: CPT

## 2018-10-22 RX ORDER — FOLIC ACID 0.8 MG
1 TABLET ORAL DAILY
Qty: 0 | Refills: 0 | Status: DISCONTINUED | OUTPATIENT
Start: 2018-10-22 | End: 2018-10-29

## 2018-10-22 RX ORDER — ACETAMINOPHEN 500 MG
975 TABLET ORAL ONCE
Qty: 0 | Refills: 0 | Status: COMPLETED | OUTPATIENT
Start: 2018-10-22 | End: 2018-10-22

## 2018-10-22 RX ORDER — CEFEPIME 1 G/1
2000 INJECTION, POWDER, FOR SOLUTION INTRAMUSCULAR; INTRAVENOUS EVERY 12 HOURS
Qty: 0 | Refills: 0 | Status: DISCONTINUED | OUTPATIENT
Start: 2018-10-22 | End: 2018-10-25

## 2018-10-22 RX ORDER — SIMVASTATIN 20 MG/1
20 TABLET, FILM COATED ORAL AT BEDTIME
Qty: 0 | Refills: 0 | Status: DISCONTINUED | OUTPATIENT
Start: 2018-10-22 | End: 2018-10-29

## 2018-10-22 RX ORDER — PANTOPRAZOLE SODIUM 20 MG/1
40 TABLET, DELAYED RELEASE ORAL
Qty: 0 | Refills: 0 | Status: DISCONTINUED | OUTPATIENT
Start: 2018-10-22 | End: 2018-10-29

## 2018-10-22 RX ORDER — METRONIDAZOLE 500 MG
500 TABLET ORAL ONCE
Qty: 0 | Refills: 0 | Status: COMPLETED | OUTPATIENT
Start: 2018-10-22 | End: 2018-10-22

## 2018-10-22 RX ORDER — ASPIRIN/CALCIUM CARB/MAGNESIUM 324 MG
81 TABLET ORAL DAILY
Qty: 0 | Refills: 0 | Status: DISCONTINUED | OUTPATIENT
Start: 2018-10-22 | End: 2018-10-29

## 2018-10-22 RX ORDER — TRAMADOL HYDROCHLORIDE 50 MG/1
25 TABLET ORAL THREE TIMES A DAY
Qty: 0 | Refills: 0 | Status: DISCONTINUED | OUTPATIENT
Start: 2018-10-22 | End: 2018-10-28

## 2018-10-22 RX ORDER — GABAPENTIN 400 MG/1
300 CAPSULE ORAL THREE TIMES A DAY
Qty: 0 | Refills: 0 | Status: DISCONTINUED | OUTPATIENT
Start: 2018-10-22 | End: 2018-10-29

## 2018-10-22 RX ORDER — LORATADINE 10 MG/1
10 TABLET ORAL DAILY
Qty: 0 | Refills: 0 | Status: DISCONTINUED | OUTPATIENT
Start: 2018-10-22 | End: 2018-10-29

## 2018-10-22 RX ORDER — WARFARIN SODIUM 2.5 MG/1
2 TABLET ORAL ONCE
Qty: 0 | Refills: 0 | Status: COMPLETED | OUTPATIENT
Start: 2018-10-22 | End: 2018-10-22

## 2018-10-22 RX ORDER — ACETAMINOPHEN 500 MG
650 TABLET ORAL EVERY 4 HOURS
Qty: 0 | Refills: 0 | Status: DISCONTINUED | OUTPATIENT
Start: 2018-10-22 | End: 2018-10-29

## 2018-10-22 RX ORDER — SODIUM CHLORIDE 9 MG/ML
1000 INJECTION INTRAMUSCULAR; INTRAVENOUS; SUBCUTANEOUS ONCE
Qty: 0 | Refills: 0 | Status: COMPLETED | OUTPATIENT
Start: 2018-10-22 | End: 2018-10-22

## 2018-10-22 RX ORDER — METOPROLOL TARTRATE 50 MG
12.5 TABLET ORAL
Qty: 0 | Refills: 0 | Status: DISCONTINUED | OUTPATIENT
Start: 2018-10-22 | End: 2018-10-23

## 2018-10-22 RX ORDER — LANOLIN ALCOHOL/MO/W.PET/CERES
3 CREAM (GRAM) TOPICAL AT BEDTIME
Qty: 0 | Refills: 0 | Status: DISCONTINUED | OUTPATIENT
Start: 2018-10-22 | End: 2018-10-29

## 2018-10-22 RX ORDER — MINOCYCLINE HYDROCHLORIDE 45 MG/1
100 TABLET, EXTENDED RELEASE ORAL
Qty: 0 | Refills: 0 | Status: DISCONTINUED | OUTPATIENT
Start: 2018-10-22 | End: 2018-10-22

## 2018-10-22 RX ORDER — ASCORBIC ACID 60 MG
500 TABLET,CHEWABLE ORAL DAILY
Qty: 0 | Refills: 0 | Status: DISCONTINUED | OUTPATIENT
Start: 2018-10-22 | End: 2018-10-29

## 2018-10-22 RX ORDER — VANCOMYCIN HCL 1 G
1000 VIAL (EA) INTRAVENOUS EVERY 12 HOURS
Qty: 0 | Refills: 0 | Status: DISCONTINUED | OUTPATIENT
Start: 2018-10-22 | End: 2018-10-24

## 2018-10-22 RX ORDER — POLYETHYLENE GLYCOL 3350 17 G/17G
17 POWDER, FOR SOLUTION ORAL DAILY
Qty: 0 | Refills: 0 | Status: DISCONTINUED | OUTPATIENT
Start: 2018-10-22 | End: 2018-10-29

## 2018-10-22 RX ORDER — METRONIDAZOLE 500 MG
500 TABLET ORAL EVERY 8 HOURS
Qty: 0 | Refills: 0 | Status: DISCONTINUED | OUTPATIENT
Start: 2018-10-23 | End: 2018-10-25

## 2018-10-22 RX ORDER — BUDESONIDE AND FORMOTEROL FUMARATE DIHYDRATE 160; 4.5 UG/1; UG/1
2 AEROSOL RESPIRATORY (INHALATION)
Qty: 0 | Refills: 0 | Status: DISCONTINUED | OUTPATIENT
Start: 2018-10-22 | End: 2018-10-29

## 2018-10-22 RX ORDER — METRONIDAZOLE 500 MG
TABLET ORAL
Qty: 0 | Refills: 0 | Status: DISCONTINUED | OUTPATIENT
Start: 2018-10-23 | End: 2018-10-25

## 2018-10-22 RX ORDER — VANCOMYCIN HCL 1 G
1000 VIAL (EA) INTRAVENOUS ONCE
Qty: 0 | Refills: 0 | Status: COMPLETED | OUTPATIENT
Start: 2018-10-22 | End: 2018-10-22

## 2018-10-22 RX ADMIN — Medication 975 MILLIGRAM(S): at 17:33

## 2018-10-22 RX ADMIN — SIMVASTATIN 20 MILLIGRAM(S): 20 TABLET, FILM COATED ORAL at 22:38

## 2018-10-22 RX ADMIN — BUDESONIDE AND FORMOTEROL FUMARATE DIHYDRATE 2 PUFF(S): 160; 4.5 AEROSOL RESPIRATORY (INHALATION) at 22:38

## 2018-10-22 RX ADMIN — Medication 100 MILLIGRAM(S): at 22:00

## 2018-10-22 RX ADMIN — GABAPENTIN 300 MILLIGRAM(S): 400 CAPSULE ORAL at 22:37

## 2018-10-22 RX ADMIN — Medication 975 MILLIGRAM(S): at 15:53

## 2018-10-22 RX ADMIN — Medication 3 MILLIGRAM(S): at 22:37

## 2018-10-22 RX ADMIN — WARFARIN SODIUM 2 MILLIGRAM(S): 2.5 TABLET ORAL at 22:37

## 2018-10-22 RX ADMIN — SODIUM CHLORIDE 2000 MILLILITER(S): 9 INJECTION INTRAMUSCULAR; INTRAVENOUS; SUBCUTANEOUS at 15:53

## 2018-10-22 RX ADMIN — Medication 1 TABLET(S): at 22:38

## 2018-10-22 RX ADMIN — Medication 250 MILLIGRAM(S): at 15:53

## 2018-10-22 RX ADMIN — CEFEPIME 100 MILLIGRAM(S): 1 INJECTION, POWDER, FOR SOLUTION INTRAMUSCULAR; INTRAVENOUS at 23:59

## 2018-10-22 NOTE — H&P ADULT - NSHPLABSRESULTS_GEN_ALL_CORE
Labs, EKG and imaging personally reviewed by me.     LABS:                        9.8    10.9  )-----------( 385      ( 22 Oct 2018 15:51 )             30.5     Hgb Trend: 9.8<--  10-22    140  |  106  |  11  ----------------------------<  97  4.6   |  22  |  0.60    Ca    8.9      22 Oct 2018 15:51    TPro  6.1  /  Alb  2.0<L>  /  TBili  0.3  /  DBili  x   /  AST  20  /  ALT  18  /  AlkPhos  107  10-22    Creatinine Trend: 0.60<--, 0.66<--, 0.73<--, 0.63<--, 0.62<--, 0.63<--  PT/INR - ( 22 Oct 2018 15:51 )   PT: 20.2 sec;   INR: 1.83 ratio         PTT - ( 22 Oct 2018 15:51 )  PTT:41.5 sec  Urinalysis Basic - ( 22 Oct 2018 17:16 )    Color: Yellow / Appearance: Clear / S.024 / pH: x  Gluc: x / Ketone: Negative  / Bili: Negative / Urobili: Negative   Blood: x / Protein: 30 mg/dL / Nitrite: Negative   Leuk Esterase: Negative / RBC: 4 /hpf / WBC 2 /hpf   Sq Epi: x / Non Sq Epi: 2 /hpf / Bacteria: Negative    Venous Blood Gas:  10-22 @ 15:51  --/--/--/--/--  VBG Lactate: 1.9    Rapid RVP Result: NotDete: The Film[employer] RVP Rapid uses polymerase chain reaction (PCR) and melt  curve analysis to screen for adenovirus; coronavirus HKU1, NL63, 229E,  OC43; human metapneumovirus (hMPV); human enterovirus/rhinovirus  (Entero/RV); influenza A; influenza A/H1;influenza A/H3; influenza  A/H1-2009; influenza B; parainfluenza viruses 1, 2, 3, 4; respiratory  syncytial virus; Bordetella pertussis; Mycoplasma pneumoniae; and  Chlamydophila pneumoniae. (10.22.18 @ 15:51)    < from: Xray Chest 1 View AP/PA (10.22.18 @ 16:34) >    INTERPRETATION:  A single chest x-ray was obtained on 2018.    Indication: Sepsis.    Impression:    The heart is slightly enlarged. Elevated right hemidiaphragm. The lungs   appear to be clear. A pacer is in good position.    < end of copied text >

## 2018-10-22 NOTE — H&P ADULT - ATTENDING COMMENTS
This patient is a 77yoF with PMH of CAD s/p HERBERT to LAD, severe AS s/p TAVR, bradycardia s/p PPM, MVR, DVT /PE on couadin, total knee replacement c/b joint infections s/p I&D explantation, static spacer lacement and complex wound closure by plastic surgery with wound healing problems and pyoderma.     Patient had been hospitalized from 9/25-10/5/2018 for septic shock, attributed to coag negative staph bacteremia. PICC pulled. ZOE did not find vegetations. Pt was recommended lifelong minocycline for MRSA knee infection. PT required 1u PRBC for anemia. 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       Patient had slow steroid taper for concern of adrenal insufficiency.       In the ED, T 101, , /80, RR 28, SpO2 97% on supplemental O2  Pt given tylenol 975mg, vancomycin 1g, IVNS 1 L bolus and cefepime in ED    Labs found WBC 10.9 with   Pt anemic with Hgb 9.8, but stable Hgb from time of discharge on 10/4/2018. INR 1.83.   CMP found low albumin of 2.0. UA negative for bacteria, positive for 30 protein. RVP negative.     CXR- lungs clear, elevated R hemid                    abx per ID, lifelong minocycline for chronic suppression  continue to spend majority of day oob to wheelchair, okay to ambulate but must remain 50% weightbearing on RLE with NO KNEE MOTION allowed, knee immobilizer at all times if OOB  DVT ppx per primary, has hx DVT  Nutrition consult recommended, optimize nutrition for continued wound healing  F/u with ortho after dc      Multiple wounds Abdomen Bilateral thighs w/ new wounds on buttocks- no gross signs of infection    VAC to RLE wounds     Aquacel to all other wounds  Rt knee surgical wound w/ skin dehiscence - per plastics and ortho  Con't steroids for PG  COnsider Pain Consult to assist w/ management- pt is growing a tolerance to pain Rx  Abx per Medicine/ ID  con't offloading  con't Nuturition  con't Pericare  Con't per Medicine  F/u as outpatient in Wound Center [facility] [phone]  d/w team & attending  Janki Cramer PA-C [id number]  I spent  25 minutes w/ this pt of which more than 50% of the time was spent counseling & coordinating care of this pt.               Assessment:  Patient with picc associated cns bacteremia now cleared and had picc pulled and zoe negative for vegetation  Plan:Per Dr. 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 Care to be resumed by day hospitalist in the morning and thereafter.

## 2018-10-22 NOTE — ED ADULT NURSE NOTE - OBJECTIVE STATEMENT
77y Female PMH 77y Female PMH CAD on baby aspirin and coumadin, bradycardia with Pacemaker presents to the ED via EMS from home c/o wounds.     Pt states she was recently admited for UTI and staph epidermidis and was on antibiotics, currently on Minocycline orally at home.     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  Pt was on a prolonged course of vancomycin and minocycline, currently still on minocycline has not had vancomycin since being discharged.  Pt was doing well up until this weekend where she was not feeling well, found to be febrile today.  No cough, no abd pain, no runny nose or sore throat.  No urinary complaints.  Has not had anything for fever prior to arrival.  Nothing makes her fever better or worse at this time. 77y Female PMH CAD on baby aspirin and coumadin, bradycardia with Pacemaker, polymyalgia, presents to the ED via EMS from home c/o wounds/fever. Pt has several pressure ulcers to bilateral thighs and hip/sacral area. Pt has wound nurse come every other day for wound dressing changes and states the wound nurse was not happy with how some of the wounds looked today, stating there was a discharge. Per EMS pt was warm to yakelin    Pt states she was recently admited for UTI and staph epidermidis and was on antibiotics, currently on Minocycline orally at home.     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  Pt was on a prolonged course of vancomycin and minocycline, currently still on minocycline has not had vancomycin since being discharged.  Pt was doing well up until this weekend where she was not feeling well, found to be febrile today.  No cough, no abd pain, no runny nose or sore throat.  No urinary complaints.  Has not had anything for fever prior to arrival.  Nothing makes her fever better or worse at this time. 77y Female PMH CAD on baby aspirin and coumadin, bradycardia with Pacemaker, polymyalgia, presents to the ED via EMS from home c/o wounds/fever. Pt has several pressure ulcers to bilateral thighs and hip/sacral area. Pt has wound nurse come every other day for wound dressing changes and states the wound nurse was not happy with how some of the wounds looked today, stating there was a discharge. Per EMS pt was warm to touch and febrile at 101. Pt denies feeling like she has a fever and did not take anything prior to arrival. Pt presents a&oX4, airway intact, breathing spontaneously and unlabored, SpO2 of 87% RA, pt placed on 2L with SpO2 of 100%, pt warm to touch, oral temp of 103.2 noted, pt tachycardic at 108, wounds to inner thighs appear to be infect with white /yellow drainage noted, other wounds are packed and no signs of infection     Pt states she was recently admited for UTI and staph epidermidis and was on antibiotics, currently on Minocycline orally at home.     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  Pt was on a prolonged course of vancomycin and minocycline, currently still on minocycline has not had vancomycin since being discharged.  Pt was doing well up until this weekend where she was not feeling well, found to be febrile today.  No cough, no abd pain, no runny nose or sore throat.  No urinary complaints.  Has not had anything for fever prior to arrival.  Nothing makes her fever better or worse at this time. 77y Female PMH CAD on baby aspirin and coumadin, bradycardia with Pacemaker, polymyalgia, presents to the ED via EMS with 24g to R. wrist from home c/o wounds/fever. Pt has several pressure ulcers to bilateral thighs and hip/sacral area. Pt has wound nurse come every other day for wound dressing changes and states the wound nurse was not happy with how some of the wounds looked today, stating there was a discharge. Per EMS pt was warm to touch and febrile at 101. Pt denies feeling like she has a fever and did not take anything prior to arrival. Pt states she was recently admitted for UTI and staph epidermidis and was on antibiotics, currently on Minocycline orally at home.  EMS states they gave 4mg Morphine for pain prior to arrival. Pt presents a&oX4, airway intact, breathing spontaneously and unlabored, SpO2 of 87% RA, pt placed on 2L with SpO2 of 100%, pt warm to touch, oral temp of 103.2 noted, pt tachycardic at 108, wounds to inner thighs appear to be infected with white /yellow drainage noted, other wounds are packed and no signs of infection, decreased movement/strength to bilateral lower extremities due to wounds, + peripheral pulses, cap refill <2 seconds, denies CP, SOB, fever/chills, n/v, abd pain, dysuria, hematuria. MD at bedside for eval. Code Sepsis initiated, two more IVs placed, fluids hung, safety maintained. 77y Female PMH CAD on baby aspirin and coumadin, bradycardia with Pacemaker, polymyalgia, presents to the ED via EMS with 24g to R. wrist from home c/o wounds/fever. Pt has several pressure ulcers to bilateral thighs and hip/sacral area. Pt has wound nurse come every other day for wound dressing changes and states the wound nurse was not happy with how some of the wounds looked today, stating there was a discharge. Per EMS pt was warm to touch and febrile at 101. Pt denies feeling like she has a fever and did not take anything prior to arrival. Pt states she was recently admitted for UTI and staph epidermidis and was on antibiotics, currently on Minocycline orally at home.  EMS states they gave 4mg Morphine for pain prior to arrival. Pt presents a&oX4, airway intact, breathing spontaneously and unlabored, SpO2 of 87% RA, pt placed on 2L with SpO2 of 100%, pt warm to touch, oral temp of 103.2 noted, pt tachycardic at 108, wounds to inner thighs appear to be infected with white /yellow drainage noted, other wounds are packed and no signs of infection, decreased movement/strength to bilateral lower extremities due to wounds, + peripheral pulses, cap refill <2 seconds, denies CP, SOB, fever/chills, n/v, abd pain, dysuria, hematuria. MD at bedside for eval. Code Sepsis initiated, two more IVs placed, fluids hung, safety maintained.    1633- Straight cath performed under sterile technique with two RNs present, 220cc of héctor colored urine drained. Hygiene needs provided and safety maintained

## 2018-10-22 NOTE — ED ADULT TRIAGE NOTE - CHIEF COMPLAINT QUOTE
wounds worsening per home nurse .  hypotensive for EMS after receiving morphine injection, bp improved with fluids

## 2018-10-22 NOTE — ED PROVIDER NOTE - PHYSICAL EXAMINATION
Gen: Chronically ill appearing, obese, NAD  Head: NCAT  HEENT: MM dry, Normal conjunctiva  Lung: CTAB, no rales, rhonchi or wheezing  CV: RRR, no murmurs, rubs or gallops  Abd: soft, NTND, no rebound or guarding  MSK: No edema, no visible deformities  Neuro: No focal neurologic deficits.  Skin: Warm and dry. Multiple wounds to LE and lower back. Wounds on medial thighs with purulent drainage and small area of surrounding erythema. No crepitus.   Psych: normal mood and affect, A&Ox3

## 2018-10-22 NOTE — H&P ADULT - PROBLEM SELECTOR PLAN 4
Patient found to have moderate diastolic dysfunction on last TTE in 10/2018  Will start home aspirin and metoprolol  Last discharge summary did not indicate that patient used diuretics, ACEI/ARB  Maintain euvolemic status

## 2018-10-22 NOTE — H&P ADULT - NSHPPHYSICALEXAM_GEN_ALL_CORE
T(C): 36.9 (10-22-18 @ 19:09), Max: 39.6 (10-22-18 @ 15:45)  HR: 98 (10-22-18 @ 19:09) (90 - 108)  BP: 117/63 (10-22-18 @ 19:09) (117/63 - 132/72)  RR: 19 (10-22-18 @ 19:09) (19 - 28)  SpO2: 98% (10-22-18 @ 19:09) (87% - 100%)  Wt(kg): --Vital Signs Last 24 Hrs  T(C): 36.9 (22 Oct 2018 19:09), Max: 39.6 (22 Oct 2018 15:45)  T(F): 98.5 (22 Oct 2018 19:09), Max: 103.2 (22 Oct 2018 15:45)  HR: 98 (22 Oct 2018 19:09) (90 - 108)  BP: 117/63 (22 Oct 2018 19:09) (117/63 - 132/72)  BP(mean): --  RR: 19 (22 Oct 2018 19:09) (19 - 28)  SpO2: 98% (22 Oct 2018 19:09) (87% - 100%)    PHYSICAL EXAM:  GENERAL: NAD, well-groomed, well-developed, obese  HEAD:  Atraumatic, Normocephalic  EYES: EOMI, PERRLA, conjunctiva and sclera clear  ENMT: No oropharyngeal exudates, erythema or lesions,  dry mucous membranes   NECK: Supple, no cervical lymphadenopathy, no JVD  NERVOUS SYSTEM:  Alert & Oriented X3, CN II-XII intact, 5/5 BUE and BLE motor strength, full sensation to light touch   CHEST/LUNG: Clear to percussion bilaterally anteriorly; No rales, no  rhonchi,   HEART: Regular rate and rhythm; No murmurs, rubs, or gallops  ABDOMEN: Soft, Nontender, Nondistended, lower abdominal healing skin lesion  EXTREMITIES:  2+ Peripheral Pulses, No clubbing, cyanosis, no edema  LYMPH: No lymphadenopathy noted  SKIN: Ulcers at medial aspect of thighs bilaterally, with mild erythema, slough, no pus drainage, Larger deep ulcers at R thigh with slough but no pus. Wounds are very painful to palpation  Large bruises on BLE, which patient states happened spontaneously and not related to trauma

## 2018-10-22 NOTE — H&P ADULT - NSHPREVIEWOFSYSTEMS_GEN_ALL_CORE
REVIEW OF SYSTEMS  CONSTITUTIONAL: + fever, no chills, + fatigue  EYES: No eye pain, no vision changes  ENMT:  No difficulty hearing, no throat pain  RESPIRATORY: No cough, no wheezing, no sputum production; No shortness of breath  CARDIOVASCULAR: No chest pain, no palpitations, no WINN, no leg swelling  GASTROINTESTINAL: No abdominal pain, no nausea, no vomiting, no hematemesis, no diarrhea, no constipation, no melena, no hematochezia.  GENITOURINARY: No dysuria, no hematuria  NEUROLOGICAL: Mild headaches, no loss of strength, no numbness  SKIN: No itching, no rashes, + multiple large ulcers on hips, lower abdomen and thighs   MUSCULOSKELETAL: No knee  pain, no joint swelling; No muscle pain  HEME/LYMPH: + easy bruising, bleeding

## 2018-10-22 NOTE — ED PROVIDER NOTE - MEDICAL DECISION MAKING DETAILS
Attending Fredrick Thompson DO: 76 yo female hx of pyoderma gangrenosum with multiple wounds to LE, sacrum and back, recent admission for sepsis related to wounds presents with fever to 103 at home taken by wound care nurse today sent to ED for evaluation. Pt denies any new complaints, only pain to wounds. No cough, no vomiting, no neck or back pain. PE: Chronically ill appearing, obese female, MMM, lungs clear, tachycardiac, ab soft nt/nd, wounds to medial thighs with purulent drainage and surrounding erythema, no crepitus. A/P: Concerned for sepsis related to chronic wounds. Will check labs, cultures, fluids, cxr, empiric abx and likely admit.

## 2018-10-22 NOTE — H&P ADULT - HISTORY OF PRESENT ILLNESS
This patient is a 77yoF with PMH of CAD s/p HERBERT to LAD, severe AS s/p TAVR, bradycardia s/p PPM, MVR, DVT /PE on couadin, total knee replacement c/b joint infections s/p I&D explantation, static spacer lacement and complex wound closure by plastic surgery with wound healing problems and pyoderma.     Patient had been hospitalized from 9/25-10/5/2018 for septic shock, attributed to coag negative staph bacteremia. PICC pulled. ZOE did not find vegetations. Pt was recommended lifelong minocycline for MRSA knee infection. PT required 1u PRBC for anemia. 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       Patient had slow steroid taper for concern of adrenal insufficiency.       In the ED, T 101, , /80, RR 28, SpO2 97% on supplemental O2  Pt given tylenol 975mg, vancomycin 1g, IVNS 1 L bolus and cefepime in ED    Labs found WBC 10.9 with   Pt anemic with Hgb 9.8, but stable Hgb from time of discharge on 10/4/2018. INR 1.83.   CMP found low albumin of 2.0. UA negative for bacteria, positive for 30 protein. RVP negative.     CXR- lungs clear, elevated R hemid                    abx per ID, lifelong minocycline for chronic suppression  continue to spend majority of day oob to wheelchair, okay to ambulate but must remain 50% weightbearing on RLE with NO KNEE MOTION allowed, knee immobilizer at all times if OOB  DVT ppx per primary, has hx DVT  Nutrition consult recommended, optimize nutrition for continued wound healing  F/u with ortho after dc      Multiple wounds Abdomen Bilateral thighs w/ new wounds on buttocks- no gross signs of infection    VAC to RLE wounds     Aquacel to all other wounds  Rt knee surgical wound w/ skin dehiscence - per plastics and ortho  Con't steroids for PG  COnsider Pain Consult to assist w/ management- pt is growing a tolerance to pain Rx  Abx per Medicine/ ID  con't offloading  con't Nuturition  con't Pericare  Con't per Medicine  F/u as outpatient in [facility] [phone]  d/w team & attending  Janki Cramer PA-C [id number]  I spent  25 minutes w/ this pt of which more than 50% of the time was spent counseling & coordinating care of this pt.               Assessment:  Patient with picc associated cns bacteremia now cleared and had picc pulled and zoe negative for vegetation  Plan:Per Dr. 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 Patient had been hospitalized from 9/25-10/5/2018 for septic shock, attributed to coag negative staph bacteremia. PICC pulled. ERIKA did not find vegetations. 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 PT required 1u PRBC for anemia. Patient had slow steroid taper for concern of adrenal insufficiency. Patient was discharged with visiting nurse services. Pt states that she developed poor appetite, new onset fever and fatigue at home and thus EMS was called. Home aide notes that patient had been complaining of severe pain from wounds at bilateral hips for last 2 days prior to admission. She denies cough, CP, N/V, diarrhea or constipation, abd pain, dysuria.     Patient and home health aide endorse that patient had no medication changes since discharge from the hospital.     In the ED, T 101, , /80, RR 28, SpO2 97% on supplemental O2  Pt given tylenol 975mg, vancomycin 1g, IVNS 1 L bolus and cefepime in ED

## 2018-10-22 NOTE — H&P ADULT - PROBLEM SELECTOR PLAN 2
Pt meets SIRS criteria based on HR and temperature  Follow up blood cultures and urine culture  Low suspicion for pulmonary, GI or  source of infection given that patient has no cough, SOB, abd pain , N/V, dysuria.  Suspect likely wound infection.  Will consult wound care in AM

## 2018-10-22 NOTE — ED PROVIDER NOTE - CARE PLAN
Principal Discharge DX:	Cellulitis of lower extremity, unspecified laterality Principal Discharge DX:	Wound infection  Secondary Diagnosis:	Pyoderma gangrenosum  Secondary Diagnosis:	Dehydration

## 2018-10-22 NOTE — H&P ADULT - PROBLEM SELECTOR PLAN 8
Start DASH diet  Patient's mobility limited due to explant of total knee replacement- activity as noted in problem 5

## 2018-10-22 NOTE — H&P ADULT - PROBLEM SELECTOR PLAN 6
Start home dose of minocycline for suppressive therapy due to infection of TKR  Per orthopedic team at last hospitalization- continue to spend majority of day oob to wheelchair, okay to ambulate but must remain 50% weightbearing on RLE with NO KNEE MOTION allowed, knee immobilizer at all times if OOB  Will keep patient on bedrest overnight while monitoring hemodynamic status  Consult PT In AM Pt had home dose of minocycline for suppressive therapy due to infection of TKR  Will hold minocycline and start IV   Per orthopedic team at last hospitalization- continue to spend majority of day oob to wheelchair, okay to ambulate but must remain 50% weightbearing on RLE with NO KNEE MOTION allowed, knee immobilizer at all times if OOB  Will keep patient on bedrest overnight while monitoring hemodynamic status  Consult PT In AM

## 2018-10-22 NOTE — H&P ADULT - PROBLEM SELECTOR PLAN 1
Wound care consult in AM  Start Wound care consult in AM  Start vancomycin 1g q12 based on GFR and weight, and check trough pre- 4th dose  Continue cefepime and start flagyl.  Follow up blood culture results

## 2018-10-22 NOTE — ED ADULT NURSE NOTE - NSIMPLEMENTINTERV_GEN_ALL_ED
Implemented All Fall with Harm Risk Interventions:  [place] to call system. Call bell, personal items and telephone within reach. Instruct patient to call for assistance. Room bathroom lighting operational. Non-slip footwear when patient is off stretcher. Physically safe environment: no spills, clutter or unnecessary equipment. Stretcher in lowest position, wheels locked, appropriate side rails in place. Provide visual cue, wrist band, yellow gown, etc. Monitor gait and stability. Monitor for mental status changes and reorient to person, place, and time. Review medications for side effects contributing to fall risk. Reinforce activity limits and safety measures with patient and family. Provide visual clues: red socks.

## 2018-10-22 NOTE — H&P ADULT - PROBLEM SELECTOR PLAN 3
New wounds appear to be appearing on L hip skin folds  Consult dermatology in AM to determine if biopsy necessary to clarify working diagnosis of pyoderma gangrenosa. Pt had been tapered off steroids at last visit- unclear if this should be restarted given that new lesions are appearing.

## 2018-10-22 NOTE — PATIENT PROFILE ADULT - VISION (WITH CORRECTIVE LENSES IF THE PATIENT USUALLY WEARS THEM):
Please contact patient and determine if she is actually taking this- was prescribed for post partum depression. If so, ok to renew (pended) for 1 year   Normal vision: sees adequately in most situations; can see medication labels, newsprint

## 2018-10-22 NOTE — H&P ADULT - ASSESSMENT
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

## 2018-10-22 NOTE — ED PROVIDER NOTE - OBJECTIVE STATEMENT
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  Pt was on a prolonged course of vancomycin and minocycline, currently still on minocycline has not had vancomycin since being discharged.  Pt was doing well up until this weekend where she was not feeling well, found to be febrile today.  No cough, no abd pain, no runny nose or sore throat.  No urinary complaints.  Has not had anything for fever prior to arrival.  Nothing makes her fever better or worse at this time.

## 2018-10-23 LAB
APTT BLD: 40.4 SEC — HIGH (ref 27.5–37.4)
CULTURE RESULTS: SIGNIFICANT CHANGE UP
INR BLD: 1.89 RATIO — HIGH (ref 0.88–1.16)
PROTHROM AB SERPL-ACNC: 21.6 SEC — HIGH (ref 10–13.1)
SPECIMEN SOURCE: SIGNIFICANT CHANGE UP

## 2018-10-23 PROCEDURE — 99232 SBSQ HOSP IP/OBS MODERATE 35: CPT

## 2018-10-23 RX ORDER — HYDROMORPHONE HYDROCHLORIDE 2 MG/ML
0.5 INJECTION INTRAMUSCULAR; INTRAVENOUS; SUBCUTANEOUS ONCE
Qty: 0 | Refills: 0 | Status: DISCONTINUED | OUTPATIENT
Start: 2018-10-23 | End: 2018-10-23

## 2018-10-23 RX ORDER — WARFARIN SODIUM 2.5 MG/1
2 TABLET ORAL ONCE
Qty: 0 | Refills: 0 | Status: COMPLETED | OUTPATIENT
Start: 2018-10-23 | End: 2018-10-23

## 2018-10-23 RX ORDER — METOPROLOL TARTRATE 50 MG
12.5 TABLET ORAL
Qty: 0 | Refills: 0 | Status: DISCONTINUED | OUTPATIENT
Start: 2018-10-23 | End: 2018-10-29

## 2018-10-23 RX ADMIN — SIMVASTATIN 20 MILLIGRAM(S): 20 TABLET, FILM COATED ORAL at 21:38

## 2018-10-23 RX ADMIN — GABAPENTIN 300 MILLIGRAM(S): 400 CAPSULE ORAL at 21:39

## 2018-10-23 RX ADMIN — Medication 3 MILLIGRAM(S): at 21:38

## 2018-10-23 RX ADMIN — GABAPENTIN 300 MILLIGRAM(S): 400 CAPSULE ORAL at 05:38

## 2018-10-23 RX ADMIN — TRAMADOL HYDROCHLORIDE 25 MILLIGRAM(S): 50 TABLET ORAL at 13:39

## 2018-10-23 RX ADMIN — Medication 100 MILLIGRAM(S): at 05:39

## 2018-10-23 RX ADMIN — Medication 100 MILLIGRAM(S): at 22:14

## 2018-10-23 RX ADMIN — PANTOPRAZOLE SODIUM 40 MILLIGRAM(S): 20 TABLET, DELAYED RELEASE ORAL at 05:38

## 2018-10-23 RX ADMIN — TRAMADOL HYDROCHLORIDE 25 MILLIGRAM(S): 50 TABLET ORAL at 14:00

## 2018-10-23 RX ADMIN — BUDESONIDE AND FORMOTEROL FUMARATE DIHYDRATE 2 PUFF(S): 160; 4.5 AEROSOL RESPIRATORY (INHALATION) at 05:44

## 2018-10-23 RX ADMIN — HYDROMORPHONE HYDROCHLORIDE 0.5 MILLIGRAM(S): 2 INJECTION INTRAMUSCULAR; INTRAVENOUS; SUBCUTANEOUS at 10:17

## 2018-10-23 RX ADMIN — Medication 1 TABLET(S): at 13:39

## 2018-10-23 RX ADMIN — Medication 100 MILLIGRAM(S): at 14:40

## 2018-10-23 RX ADMIN — Medication 1 TABLET(S): at 21:39

## 2018-10-23 RX ADMIN — Medication 650 MILLIGRAM(S): at 18:46

## 2018-10-23 RX ADMIN — BUDESONIDE AND FORMOTEROL FUMARATE DIHYDRATE 2 PUFF(S): 160; 4.5 AEROSOL RESPIRATORY (INHALATION) at 18:17

## 2018-10-23 RX ADMIN — Medication 500 MILLIGRAM(S): at 13:39

## 2018-10-23 RX ADMIN — CEFEPIME 100 MILLIGRAM(S): 1 INJECTION, POWDER, FOR SOLUTION INTRAMUSCULAR; INTRAVENOUS at 10:32

## 2018-10-23 RX ADMIN — Medication 650 MILLIGRAM(S): at 18:16

## 2018-10-23 RX ADMIN — Medication 12.5 MILLIGRAM(S): at 18:16

## 2018-10-23 RX ADMIN — Medication 12.5 MILLIGRAM(S): at 05:38

## 2018-10-23 RX ADMIN — Medication 1 TABLET(S): at 13:40

## 2018-10-23 RX ADMIN — Medication 250 MILLIGRAM(S): at 18:16

## 2018-10-23 RX ADMIN — LORATADINE 10 MILLIGRAM(S): 10 TABLET ORAL at 13:39

## 2018-10-23 RX ADMIN — Medication 250 MILLIGRAM(S): at 05:39

## 2018-10-23 RX ADMIN — Medication 1 MILLIGRAM(S): at 13:41

## 2018-10-23 RX ADMIN — GABAPENTIN 300 MILLIGRAM(S): 400 CAPSULE ORAL at 13:39

## 2018-10-23 RX ADMIN — Medication 1 TABLET(S): at 05:38

## 2018-10-23 RX ADMIN — WARFARIN SODIUM 2 MILLIGRAM(S): 2.5 TABLET ORAL at 21:38

## 2018-10-23 RX ADMIN — Medication 81 MILLIGRAM(S): at 13:40

## 2018-10-23 NOTE — PROGRESS NOTE ADULT - SUBJECTIVE AND OBJECTIVE BOX
Orthopedic Surgery Progress Note  S: Patient readmitted for fevers. Says her R knee does not bother her, also says there has not been drainage from the inferior wound. Says that there has been concern for infection of bilateral thigh wounds, which have been draining.    O:  Vital Signs Last 24 Hrs  T(C): 36.7 (23 Oct 2018 05:23), Max: 39.6 (22 Oct 2018 15:45)  T(F): 98.1 (23 Oct 2018 05:23), Max: 103.2 (22 Oct 2018 15:45)  HR: 97 (23 Oct 2018 05:23) (90 - 108)  BP: 136/62 (23 Oct 2018 05:23) (117/63 - 136/62)  RR: 18 (23 Oct 2018 05:23) (18 - 28)  SpO2: 95% (23 Oct 2018 05:23) (87% - 100%)    Gen: NAD  RLE     wound inferior to prior healed incision with granulation tissue  no active drainage  SILT throughout  fires TA/EHL/GS  WWP distally                        9.8    10.9  )-----------( 385      ( 22 Oct 2018 15:51 )             30.5     10-22    140  |  106  |  11  ----------------------------<  97  4.6   |  22  |  0.60    PT/INR - ( 23 Oct 2018 08:14 )   PT: 21.6 sec;   INR: 1.89 ratio       PTT - ( 23 Oct 2018 08:14 )  PTT:40.4 sec    A/P 77y year old Female 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 requiring I&D, TKA explant, static spacer placement with complex wound closure by plastic surgery complicated by wound healing problems and development of pyoderma granulosum; patient readmitted for fevers and concern for infection of wounds to bilateral medial thighs. R knee wound appears much improved and is not concerning for infection of the R knee presently.    Needs wound care for management of her wounds  On lifelong minocycline for chronic suppression, current on broad spectrum coverage, should continue suppressive antibiotics on discharge  continue to spend majority of day oob to wheelchair, okay to ambulate but must remain 50% weightbearing on RLE with NO KNEE MOTION allowed, knee immobilizer at all times if OOB  DVT ppx per primary, has hx DVT  Nutrition consult recommended, optimize nutrition for continued wound healing  F/u with ortho after dc    Patient discussed with Dr. Bass, who will see her today    Maik Blanco MD

## 2018-10-23 NOTE — DIETITIAN INITIAL EVALUATION ADULT. - PROBLEM SELECTOR PLAN 6
Pt had home dose of minocycline for suppressive therapy due to infection of TKR  Will hold minocycline and start IV   Per orthopedic team at last hospitalization- continue to spend majority of day oob to wheelchair, okay to ambulate but must remain 50% weightbearing on RLE with NO KNEE MOTION allowed, knee immobilizer at all times if OOB  Will keep patient on bedrest overnight while monitoring hemodynamic status  Consult PT In AM

## 2018-10-23 NOTE — PROVIDER CONTACT NOTE (OTHER) - ACTION/TREATMENT ORDERED:
as per Trisha Angulo, NP do a bladder scan.  bladder scan was 344, as per Trisha Angulo, NP reassess with bladder scan in 2hrs.  will continue to monitor.

## 2018-10-23 NOTE — CONSULT NOTE ADULT - ASSESSMENT
A/P:78yo female pmh HTN, CAD with PPM, recent admission for infected right knee hardware on minocycline by PICC at rehab center p/w multiple episodes of syncope, found to be hypotensive 60/40 by EMS, started on IVF by EMS, received 100cc prior to arrival, also hypoxic to low 90s on RA. Pt c/o dysuria and foul-smelling urine x 5 days, c/o dizziness. Denies fever, chills, chest pain, dyspnea, palpitations, dizziness, weakness, recent cough, nausea, vomiting, diarrhea, abdominal pain, BRBPR, melena, back pain, leg swelling    Multiple wounds w/ Resolving Pyoderma Gangrenosum- Con't steriods-  Aquacel/ Medihoney  Abdominal & Anterior Rt Thigh Wounds - Nearly healed- Allevyn   Lateral Lt Pannus superficial wound w/ Moisture Associated Dermatitis - Aquacel  Rt lateral thigh wounds- VAC  Consider Plastics (Dr Irwin) as they were involved in care/ and reconstruction of Rt knee dehisced wound- Aqucel while awaiting consult  BLE elevation  Abx per Medicine/ ID  Moisturize intact skin w/ SWEEN cream BID  con't Nutrition (as tolerated), Nutrition Consult  con't Offloading   con't Pericare  Care as per medicine will follow w/ you  Upon discharge f/u as outpatient at [facility] [address] [place] [address] [phone]  Seen w/ attng and D/w team  Thank you for this consult  Janki Cramer PA-C CWS [id number] A/P:76yo female pmh HTN, CAD with PPM, recent admission for infected right knee hardware on minocycline by PICC at rehab center p/w multiple episodes of syncope, found to be hypotensive 60/40 by EMS, started on IVF by EMS, received 100cc prior to arrival, also hypoxic to low 90s on RA. Pt c/o dysuria and foul-smelling urine x 5 days, c/o dizziness. Denies fever, chills, chest pain, dyspnea, palpitations, dizziness, weakness, recent cough, nausea, vomiting, diarrhea, abdominal pain, BRBPR, melena, back pain, leg swelling    Multiple wounds w/ Resolving Pyoderma Gangrenosum- Con't steriods-  Aquacel/ Medihoney  Abdominal & Anterior Rt Thigh Wounds - Nearly healed- Allevyn   Lateral Lt Pannus superficial wound w/ Moisture Associated Dermatitis - Aquacel  Rt lateral thigh wounds- VAC  Consider Plastics (Dr Irwin) as they were involved in care/ and reconstruction of Rt knee dehisced wound- Aqucel while awaiting consult  Consider Psych or Social Work   BLE elevation  Abx per Medicine/ ID  Moisturize intact skin w/ SWEEN cream BID  con't Nutrition (as tolerated), Nutrition Consult  con't Offloading   con't Pericare  Care as per medicine will follow w/ you  Upon discharge f/u as outpatient at [facility] [address] [facility] [phone]  Seen w/ attng and D/w team  Thank you for this consult  Janki Cramer PA-C CWS [id number]

## 2018-10-23 NOTE — CONSULT NOTE ADULT - SUBJECTIVE AND OBJECTIVE BOX
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 Patient had been hospitalized from -10/5/2018 for septic shock, attributed to coag negative staph bacteremia. PICC pulled. ERIKA did not find vegetations. 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 PT required 1u PRBC for anemia. Patient had slow steroid taper for concern of adrenal insufficiency. Patient was discharged with visiting nurse services. Pt states that she developed poor appetite, new onset fever and fatigue at home and thus EMS was called. Home aide notes that patient had been complaining of severe pain from wounds at bilateral hips for last 2 days prior to admission. She denies cough, CP, N/V, diarrhea or constipation, abd pain, dysuria. Patient and home health aide endorse that patient had no medication changes since discharge from the hospital.    Wound consult requested to assist w/ management of mulitple wound/ pressure ulcer.  Some of the wounds are much improved.  Pt continues to be on steroids.  VAC & Aquacel packing being used on wounds depending on its progress. ?of odor noted.  No excess drainage, redness, warmth, pain noted.   Drainage managed by dressing.  Pt still sedentary most of day.  Moving more- working on increasing strength, can stand up a bit, but not really walking much.  HHA at bedside.  All questions asked and answered to pt's satisfaction.   Pt's pain with dressing changes is unchanged.  Pt was premedicated.  With calming talk and encouragement from HHA & staff dressings were changed.  Pt's depressed state of mind regarding her overall condition is unchanged.  Pt is well known to team from previous admissions.      PAST MEDICAL & SURGICAL HISTORY:  CAD (coronary artery disease): HERBERT to LAD 2016  Aortic stenosis, severe  Uncomplicated asthma, unspecified asthma severity  Polymyalgia  Lumbar disc disease with radiculopathy  Spider veins  Anxiety  Severe aortic stenosis by prior echocardiogram:  and  2016  Dysphagia  Macular degeneration  Hiatal hernia  GERD (gastroesophageal reflux disease)  Sciatica  Osteoarthritis  S/P TKR (total knee replacement): joint infection s/p explant and abx spacer on 17  S/P TAVR (transcatheter aortic valve replacement): 17  Artificial cardiac pacemaker: 17  s/p cardiac catheterization: 16 HERBERT placed on LAD  s/p endoscopy: with dilatation of esophageal stricture   S/P cataract surgery: x2; 3-4yr ago  S/P gastroplasty: 28 yrs ago  S/P cholecystectomy: more than 15 years ago  S/P total knee replacement: left, 15yr ago  S/P total knee replacement: right, 12yr ago  S/P hysterectomy: 24yr ago      REVIEW OF SYSTEMS  Skin/ MSK: see HPI  All other systems negative    MEDICATIONS  (STANDING):  ascorbic acid 500 milliGRAM(s) Oral daily  aspirin enteric coated 81 milliGRAM(s) Oral daily  buDESOnide 160 MICROgram(s)/formoterol 4.5 MICROgram(s) Inhaler 2 Puff(s) Inhalation two times a day  calcium carbonate 1250 mG  + Vitamin D (OsCal 500 + D) 1 Tablet(s) Oral three times a day  cefepime   IVPB 2000 milliGRAM(s) IV Intermittent every 12 hours  folic acid 1 milliGRAM(s) Oral daily  gabapentin 300 milliGRAM(s) Oral three times a day  loratadine 10 milliGRAM(s) Oral daily  melatonin 3 milliGRAM(s) Oral at bedtime  metoprolol tartrate 12.5 milliGRAM(s) Oral two times a day  metroNIDAZOLE  IVPB 500 milliGRAM(s) IV Intermittent every 8 hours  metroNIDAZOLE  IVPB      multivitamin 1 Tablet(s) Oral daily  pantoprazole    Tablet 40 milliGRAM(s) Oral before breakfast  simvastatin 20 milliGRAM(s) Oral at bedtime  vancomycin  IVPB 1000 milliGRAM(s) IV Intermittent every 12 hours  warfarin 2 milliGRAM(s) Oral once    MEDICATIONS  (PRN):  acetaminophen   Tablet .. 650 milliGRAM(s) Oral every 4 hours PRN Temp greater or equal to 38C (100.4F), Mild Pain (1 - 3)  polyethylene glycol 3350 17 Gram(s) Oral daily PRN Constipation  traMADol 25 milliGRAM(s) Oral three times a day PRN Moderate Pain (4 - 6)      Allergies    amoxicillin (Other)    Intolerances    IV Contrast (Flushing (Skin); Nausea)      SOCIAL HISTORY:  ; (+)HHA/ has been in SNF; Denies smoking, ETOH, drugs    FAMILY HISTORY:  No pertinent family history in first degree relatives      Vital Signs Last 24 Hrs  T(C): 38.4 (23 Oct 2018 18:14), Max: 38.4 (23 Oct 2018 18:14)  T(F): 101.1 (23 Oct 2018 18:14), Max: 101.1 (23 Oct 2018 18:14)  HR: 91 (23 Oct 2018 18:14) (86 - 98)  BP: 141/82 (23 Oct 2018 18:14) (103/66 - 141/82)  BP(mean): --  RR: 18 (23 Oct 2018 18:14) (17 - 19)  SpO2: 98% (23 Oct 2018 12:52) (95% - 98%)  NAD / A&Ox3/   MO/ frail- appears more mobile, bt still mostly sedentary  WD/ WN/ WG  Versa Care P500 bed    Cardiovascular: RRR     Respiratory: CTA    Gastrointestinal soft NT/ND (+)BS      Neurology  weakened strength & sensation grossly intact    Musculoskeletal/Vascular: FROM x4  BLE edema equal  BLE equally warm  no acute ischemia noted  mild hemosiderin staining  no deformities/ contractures    Skin:  frail,  ecchymosis w/o hematoma      pt is tender when just gently touching skin- therefore exam was limited & measurements inputted by RN seem appropriate      RLQ abdomen wound mostly healed w/ newly epithelized skin- w/pin point areas of moist & granular tissue no odor, kimber- in an area 2cm x 0.3cm x 0.1cm      Rt anterior thigh wound w/ moist & granular tissue - kimber- nearly healed- irregular shaped linear partial thickness-disconnected 3cm x 10cm x 0.2- serous crusting easily cleaned of- scant serosanguinous drainage w/ o odor    Rt Thigh superior posterolateral w/ new upper thigh 2 small wounds opened w/ serosanguinous drainage w/ increased granular tissue w/ scant fibrinous exudate & slough    Rt Thigh inferior posterolateral wound   increased moist & granular tissue   (+) serosanguinous drainage & tender w/o odor  No erythema, increased warmth, induration, fluctuance      Rt Lateral upper outer thigh/hip w/ soft necrotic tissue w/ halo granular tissue  4cm x 3cm x 0.1cm, no drainage or odor    Lt upper lateral thigh wound  full thickness ulcers w/ moist & granular tissue   (+)serosanguinous  drainage  (+)tender w/o malodor  No erythema, increased warmth, induration, fluctuance      Lt  Upper medial inner thigh   2 wounds/ now several pinpoint areas w/  2 partial thickness -   moist &granular   No drainage  (+)tender w/o odor  No erythema, increased warmth, induration, fluctuance            LABS:  10-22    140  |  106  |  11  ----------------------------<  97  4.6   |  22  |  0.60    Ca    8.9      22 Oct 2018 15:51    TPro  6.1  /  Alb  2.0<L>  /  TBili  0.3  /  DBili  x   /  AST  20  /  ALT  18  /  AlkPhos  107  10-22                          9.8    10.9  )-----------( 385      ( 22 Oct 2018 15:51 )             30.5     PT/INR - ( 23 Oct 2018 08:14 )   PT: 21.6 sec;   INR: 1.89 ratio    PTT - ( 23 Oct 2018 08:14 )  PTT:40.4 sec    Urinalysis Basic - ( 22 Oct 2018 17:16 )    Color: Yellow / Appearance: Clear / S.024 / pH: x  Gluc: x / Ketone: Negative  / Bili: Negative / Urobili: Negative   Blood: x / Protein: 30 mg/dL / Nitrite: Negative   Leuk Esterase: Negative / RBC: 4 /hpf / WBC 2 /hpf   Sq Epi: x / Non Sq Epi: 2 /hpf / Bacteria: Negative        RADIOLOGY & ADDITIONAL STUDIES:    < from: Xray Chest 1 View AP/PA (10.22.18 @ 16:34) >  Indication: Sepsis.    Impression:    The heart is slightly enlarged. Elevated right hemidiaphragm. The lungs   appear to be clear. A pacer is in good position. 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 Patient had been hospitalized from -10/5/2018 for septic shock, attributed to coag negative staph bacteremia. PICC pulled. ERIKA did not find vegetations. 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 PT required 1u PRBC for anemia. Patient had slow steroid taper for concern of adrenal insufficiency. Patient was discharged with visiting nurse services. Pt states that she developed poor appetite, new onset fever and fatigue at home and thus EMS was called. Home aide notes that patient had been complaining of severe pain from wounds at bilateral hips for last 2 days prior to admission. She denies cough, CP, N/V, diarrhea or constipation, abd pain, dysuria. Patient and home health aide endorse that patient had no medication changes since discharge from the hospital.    Wound consult requested to assist w/ management of mulitple wound/ pressure ulcer.  Some of the wounds are much improved.  Pt continues to be on steroids.  VAC & Aquacel packing being used on wounds depending on its progress. ?of odor noted.  No excess drainage, redness, warmth, pain noted.   Drainage managed by dressing.  Pt still sedentary most of day.  Moving more- working on increasing strength, can stand up a bit, but not really walking much.  HHA at bedside.  All questions asked and answered to pt's satisfaction.   Pt's pain with dressing changes is unchanged.  Pt was premedicated.  With calming talk and encouragement from HHA & staff dressings were changed.  Pt's depressed state of mind regarding her overall condition is unchanged.  Pt is well known to team from previous admissions.      PAST MEDICAL & SURGICAL HISTORY:  CAD (coronary artery disease): HERBERT to LAD 2016  Aortic stenosis, severe  Uncomplicated asthma, unspecified asthma severity  Polymyalgia  Lumbar disc disease with radiculopathy  Spider veins  Anxiety  Severe aortic stenosis by prior echocardiogram:  and  2016  Dysphagia  Macular degeneration  Hiatal hernia  GERD (gastroesophageal reflux disease)  Sciatica  Osteoarthritis  S/P TKR (total knee replacement): joint infection s/p explant and abx spacer on 17  S/P TAVR (transcatheter aortic valve replacement): 17  Artificial cardiac pacemaker: 17  s/p cardiac catheterization: 16 HERBERT placed on LAD  s/p endoscopy: with dilatation of esophageal stricture   S/P cataract surgery: x2; 3-4yr ago  S/P gastroplasty: 28 yrs ago  S/P cholecystectomy: more than 15 years ago  S/P total knee replacement: left, 15yr ago  S/P total knee replacement: right, 12yr ago  S/P hysterectomy: 24yr ago      REVIEW OF SYSTEMS  Skin/ MSK: see HPI  All other systems negative    MEDICATIONS  (STANDING):  ascorbic acid 500 milliGRAM(s) Oral daily  aspirin enteric coated 81 milliGRAM(s) Oral daily  buDESOnide 160 MICROgram(s)/formoterol 4.5 MICROgram(s) Inhaler 2 Puff(s) Inhalation two times a day  calcium carbonate 1250 mG  + Vitamin D (OsCal 500 + D) 1 Tablet(s) Oral three times a day  cefepime   IVPB 2000 milliGRAM(s) IV Intermittent every 12 hours  folic acid 1 milliGRAM(s) Oral daily  gabapentin 300 milliGRAM(s) Oral three times a day  loratadine 10 milliGRAM(s) Oral daily  melatonin 3 milliGRAM(s) Oral at bedtime  metoprolol tartrate 12.5 milliGRAM(s) Oral two times a day  metroNIDAZOLE  IVPB 500 milliGRAM(s) IV Intermittent every 8 hours  metroNIDAZOLE  IVPB      multivitamin 1 Tablet(s) Oral daily  pantoprazole    Tablet 40 milliGRAM(s) Oral before breakfast  simvastatin 20 milliGRAM(s) Oral at bedtime  vancomycin  IVPB 1000 milliGRAM(s) IV Intermittent every 12 hours  warfarin 2 milliGRAM(s) Oral once    MEDICATIONS  (PRN):  acetaminophen   Tablet .. 650 milliGRAM(s) Oral every 4 hours PRN Temp greater or equal to 38C (100.4F), Mild Pain (1 - 3)  polyethylene glycol 3350 17 Gram(s) Oral daily PRN Constipation  traMADol 25 milliGRAM(s) Oral three times a day PRN Moderate Pain (4 - 6)      Allergies    amoxicillin (Other)    Intolerances    IV Contrast (Flushing (Skin); Nausea)      SOCIAL HISTORY:  ; (+)HHA/ has been in SNF; Denies smoking, ETOH, drugs    FAMILY HISTORY:  No pertinent family history in first degree relatives      Vital Signs Last 24 Hrs  T(C): 38.4 (23 Oct 2018 18:14), Max: 38.4 (23 Oct 2018 18:14)  T(F): 101.1 (23 Oct 2018 18:14), Max: 101.1 (23 Oct 2018 18:14)  HR: 91 (23 Oct 2018 18:14) (86 - 98)  BP: 141/82 (23 Oct 2018 18:14) (103/66 - 141/82)  BP(mean): --  RR: 18 (23 Oct 2018 18:14) (17 - 19)  SpO2: 98% (23 Oct 2018 12:52) (95% - 98%)  NAD / A&Ox3/   MO/ frail- appears more mobile, bt still mostly sedentary  WD/ WN/ WG  Versa Care P500 bed    Cardiovascular: RRR     Respiratory: CTA    Gastrointestinal soft NT/ND (+)BS      Neurology  weakened strength & sensation grossly intact    Musculoskeletal/Vascular: FROM x4  BLE edema equal  BLE equally warm  no acute ischemia noted  mild hemosiderin staining  no deformities/ contractures    Skin:  frail,  ecchymosis w/o hematoma      pt is tender when just gently touching skin- therefore exam was limited & measurements inputted by RN seem appropriate      RLQ abdomen wound mostly healed w/ newly epithelized skin- w/pin point areas of moist & granular tissue no odor, kimber- in an area 2cm x 0.3cm x 0.1cm      Rt anterior thigh wound w/ moist & granular tissue - kimber- nearly healed- irregular shaped linear partial thickness-disconnected 3cm x 10cm x 0.2- serous crusting easily cleaned of- scant serosanguinous drainage w/ o odor    Rt Thigh superior posterolateral w/ new upper thigh 2 small wounds opened w/ serosanguinous drainage w/ nonviable &  moist granular tissue w/ scant fibrinous exudate     Rt Thigh inferior posterolateral wound   increased moist & granular tissue   (+) serosanguinous drainage & tender w/o odor  No erythema, increased warmth, induration, fluctuance      Rt Lateral upper outer thigh/hip w/ soft necrotic tissue w/ halo granular tissue  4cm x 3cm x 0.1cm, no drainage or odor    Lt upper lateral thigh wound  full thickness ulcers w/ moist & granular tissue   (+)serosanguinous  drainage  (+)tender w/o malodor  No erythema, increased warmth, induration, fluctuance      Lt  Upper medial inner thigh   2 wounds/moist granular  & nonviable tissue  No drainage  (+)tender w/o odor  No erythema, increased warmth, induration, fluctuance            LABS:  10-22    140  |  106  |  11  ----------------------------<  97  4.6   |  22  |  0.60    Ca    8.9      22 Oct 2018 15:51    TPro  6.1  /  Alb  2.0<L>  /  TBili  0.3  /  DBili  x   /  AST  20  /  ALT  18  /  AlkPhos  107  10-22                          9.8    10.9  )-----------( 385      ( 22 Oct 2018 15:51 )             30.5     PT/INR - ( 23 Oct 2018 08:14 )   PT: 21.6 sec;   INR: 1.89 ratio    PTT - ( 23 Oct 2018 08:14 )  PTT:40.4 sec    Urinalysis Basic - ( 22 Oct 2018 17:16 )    Color: Yellow / Appearance: Clear / S.024 / pH: x  Gluc: x / Ketone: Negative  / Bili: Negative / Urobili: Negative   Blood: x / Protein: 30 mg/dL / Nitrite: Negative   Leuk Esterase: Negative / RBC: 4 /hpf / WBC 2 /hpf   Sq Epi: x / Non Sq Epi: 2 /hpf / Bacteria: Negative        RADIOLOGY & ADDITIONAL STUDIES:    < from: Xray Chest 1 View AP/PA (10.22.18 @ 16:34) >  Indication: Sepsis.    Impression:    The heart is slightly enlarged. Elevated right hemidiaphragm. The lungs   appear to be clear. A pacer is in good position.

## 2018-10-23 NOTE — DIETITIAN INITIAL EVALUATION ADULT. - PROBLEM SELECTOR PLAN 7
Start coumadin, INR slightly subtherapeutic at 1.8.   Will dose 2mg coumadin today and recheck INR in AM.

## 2018-10-23 NOTE — DIETITIAN INITIAL EVALUATION ADULT. - OTHER INFO
patient seiggered for Pressure ulcer > st II. Patient known from previous and recurrent admissions. Patient is bedbound, personal HHA at bedside, patient has chronic skin break down, non healing wounds, severe luann hip pain, right lateral thigh wound. patient acepted james protein powder than vist on 3 dugan 4/2018. Patient remembered supplement and agreed to drink 2x daily. Patient's  brings her food from outside, regularly.. Patient takes MVI, Vitamin C, folic acid and oscal +D at home.

## 2018-10-23 NOTE — DIETITIAN INITIAL EVALUATION ADULT. - PROBLEM SELECTOR PLAN 1
Wound care consult in AM  Start vancomycin 1g q12 based on GFR and weight, and check trough pre- 4th dose  Continue cefepime and start flagyl.  Follow up blood culture results

## 2018-10-23 NOTE — PROVIDER CONTACT NOTE (OTHER) - ASSESSMENT
pt a&ox4.  pt hasn't voided since arrival on unit.  put pt on female urinal several times, did not void.  bladder scan done, amount 344.

## 2018-10-23 NOTE — CONSULT NOTE ADULT - SUBJECTIVE AND OBJECTIVE BOX
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 Patient had been hospitalized from -10/5/2018 for septic shock, attributed to coag negative staph bacteremia. PICC pulled. ERIKA did not find vegetations. 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 PT required 1u PRBC for anemia. Patient had slow steroid taper for concern of adrenal insufficiency. Patient was discharged with visiting nurse services. Pt states that she developed poor appetite, new onset fever and fatigue at home and thus EMS was called. Home aide notes that patient had been complaining of severe pain from wounds at bilateral hips for last 2 days prior to admission. She denies cough, CP, N/V, diarrhea or constipation, abd pain, dysuria.     Patient and home health aide endorse that patient had no medication changes since discharge from the hospital.     In the ED, T 101, , /80, RR 28, SpO2 97% on supplemental O2  Pt given tylenol 975mg, vancomycin 1g, IVNS 1 L bolus and cefepime in ED       PAST MEDICAL & SURGICAL HISTORY:  CAD (coronary artery disease): HERBERT to LAD 2016  Aortic stenosis, severe  Uncomplicated asthma, unspecified asthma severity  Polymyalgia  Lumbar disc disease with radiculopathy  Spider veins  Anxiety  Severe aortic stenosis by prior echocardiogram:  and  2016  Dysphagia  Macular degeneration  Hiatal hernia  GERD (gastroesophageal reflux disease)  Sciatica  Osteoarthritis  S/P TKR (total knee replacement): joint infection s/p explant and abx spacer on 17  S/P TAVR (transcatheter aortic valve replacement): 17  Artificial cardiac pacemaker: 17  H/O cardiac catheterization: 16 HERBERT placed on LAD  H/O endoscopy: with dilatation of esophageal stricture   S/P cataract surgery: x2; 3-4yr ago  S/P gastroplasty: 28 yrs ago  S/P cholecystectomy: more than 15 years ago  S/P total knee replacement: left, 15yr ago  S/P total knee replacement: right, 12yr ago  S/P hysterectomy: 24yr ago      Review of Systems:   CONSTITUTIONAL: No fever, weight loss, or fatigue  EYES: No eye pain, visual disturbances, or discharge  ENMT:  No difficulty hearing, tinnitus, vertigo; No sinus or throat pain  NECK: No pain or stiffness  BREASTS: No pain, masses, or nipple discharge  RESPIRATORY: No cough, wheezing, chills or hemoptysis; No shortness of breath  CARDIOVASCULAR: No chest pain, palpitations, dizziness, or leg swelling  GASTROINTESTINAL: No abdominal or epigastric pain. No nausea, vomiting, or hematemesis; No diarrhea or constipation. No melena or hematochezia.  GENITOURINARY: No dysuria, frequency, hematuria, or incontinence  NEUROLOGICAL: No headaches, memory loss, loss of strength, numbness, or tremors  SKIN: No itching, burning, rashes, or lesions   LYMPH NODES: No enlarged glands  ENDOCRINE: No heat or cold intolerance; No hair loss  MUSCULOSKELETAL: No joint pain or swelling; No muscle, back, or extremity pain  PSYCHIATRIC: No depression, anxiety, mood swings, or difficulty sleeping  HEME/LYMPH: No easy bruising, or bleeding gums  ALLERY AND IMMUNOLOGIC: No hives or eczema    Allergies    amoxicillin (Other)    Intolerances    IV Contrast (Flushing (Skin); Nausea)      Social History:     FAMILY HISTORY:  No pertinent family history in first degree relatives      MEDICATIONS  (STANDING):  ascorbic acid 500 milliGRAM(s) Oral daily  aspirin enteric coated 81 milliGRAM(s) Oral daily  buDESOnide 160 MICROgram(s)/formoterol 4.5 MICROgram(s) Inhaler 2 Puff(s) Inhalation two times a day  calcium carbonate 1250 mG  + Vitamin D (OsCal 500 + D) 1 Tablet(s) Oral three times a day  cefepime   IVPB 2000 milliGRAM(s) IV Intermittent every 12 hours  folic acid 1 milliGRAM(s) Oral daily  gabapentin 300 milliGRAM(s) Oral three times a day  loratadine 10 milliGRAM(s) Oral daily  melatonin 3 milliGRAM(s) Oral at bedtime  metoprolol tartrate 12.5 milliGRAM(s) Oral two times a day  metroNIDAZOLE  IVPB 500 milliGRAM(s) IV Intermittent every 8 hours  metroNIDAZOLE  IVPB      multivitamin 1 Tablet(s) Oral daily  pantoprazole    Tablet 40 milliGRAM(s) Oral before breakfast  simvastatin 20 milliGRAM(s) Oral at bedtime  vancomycin  IVPB 1000 milliGRAM(s) IV Intermittent every 12 hours    MEDICATIONS  (PRN):  acetaminophen   Tablet .. 650 milliGRAM(s) Oral every 4 hours PRN Temp greater or equal to 38C (100.4F), Mild Pain (1 - 3)  polyethylene glycol 3350 17 Gram(s) Oral daily PRN Constipation  traMADol 25 milliGRAM(s) Oral three times a day PRN Moderate Pain (4 - 6)      Vital Signs Last 24 Hrs  T(C): 36.7 (23 Oct 2018 05:23), Max: 39.6 (22 Oct 2018 15:45)  T(F): 98.1 (23 Oct 2018 05:23), Max: 103.2 (22 Oct 2018 15:45)  HR: 97 (23 Oct 2018 05:23) (90 - 108)  BP: 136/62 (23 Oct 2018 05:23) (117/63 - 136/62)  BP(mean): --  RR: 18 (23 Oct 2018 05:23) (18 - 28)  SpO2: 95% (23 Oct 2018 05:23) (87% - 100%)  CAPILLARY BLOOD GLUCOSE        I&O's Summary    22 Oct 2018 07:  -  23 Oct 2018 07:00  --------------------------------------------------------  IN: 310 mL / OUT: 0 mL / NET: 310 mL    23 Oct 2018 07:  -  23 Oct 2018 11:36  --------------------------------------------------------  IN: 240 mL / OUT: 0 mL / NET: 240 mL        PHYSICAL EXAM:  GENERAL: NAD, well-developed  HEAD:  Atraumatic, Normocephalic  EYES: EOMI, PERRLA, conjunctiva and sclera clear  NECK: Supple, No JVD  CHEST/LUNG: Clear to auscultation bilaterally; No wheeze  HEART: Regular rate and rhythm; No murmurs, rubs, or gallops  ABDOMEN: Soft, Nontender, Nondistended; Bowel sounds present  EXTREMITIES:  2+ Peripheral Pulses, No clubbing, cyanosis, or edema  PSYCH: AAOx3  NEUROLOGY: non-focal  SKIN: No rashes or lesions    LABS:                        9.8    10.9  )-----------( 385      ( 22 Oct 2018 15:51 )             30.5     10-    140  |  106  |  11  ----------------------------<  97  4.6   |  22  |  0.60    Ca    8.9      22 Oct 2018 15:51    TPro  6.1  /  Alb  2.0<L>  /  TBili  0.3  /  DBili  x   /  AST  20  /  ALT  18  /  AlkPhos  107  10-    PT/INR - ( 23 Oct 2018 08:14 )   PT: 21.6 sec;   INR: 1.89 ratio         PTT - ( 23 Oct 2018 08:14 )  PTT:40.4 sec      Urinalysis Basic - ( 22 Oct 2018 17:16 )    Color: Yellow / Appearance: Clear / S.024 / pH: x  Gluc: x / Ketone: Negative  / Bili: Negative / Urobili: Negative   Blood: x / Protein: 30 mg/dL / Nitrite: Negative   Leuk Esterase: Negative / RBC: 4 /hpf / WBC 2 /hpf   Sq Epi: x / Non Sq Epi: 2 /hpf / Bacteria: Negative        RADIOLOGY & ADDITIONAL TESTS:    Imaging Personally Reviewed:    Consultant(s) Notes Reviewed:      Care Discussed with Consultants/Other Providers:

## 2018-10-23 NOTE — CONSULT NOTE ADULT - ASSESSMENT
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       R/O Wound infection.    -  Wound care consult   - Start vancomycin 1g q12 based on GFR and weight, and check trough pre- 4th dose  - Continue cefepime and d/c flagyl.  - Follow up blood culture results.   - d/w Id consult    SIRS (systemic inflammatory response syndrome).    - Pt meets SIRS criteria based on HR and temperature  - Follow up blood cultures and urine culture  - Low suspicion for pulmonary, GI or  source of infection given that patient has no cough, SOB, abd pain , N/V, dysuria.  - Suspect likely wound infection.    Pyoderma gangrenosa.    - New wounds appear to be appearing on L hip skin folds  will consider Consult in dermatology to determine if biopsy necessary to clarify working diagnosis of pyoderma gangrenosa. Pt had been tapered off steroids at last visit- unclear if this should be restarted given that new lesions are appearing.     Chronic diastolic heart failure.    -  Patient found to have moderate diastolic dysfunction on last TTE in 10/2018  - Will start home aspirin and metoprolol  - Maintain euvolemic status.     CAD (coronary artery disease).   - cont  home dose of aspirin and metoprolol.     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   -  Pt had home dose of minocycline for suppressive therapy due to infection of TKR  - Will hold minocycline and start IV   - Per orthopedic team at last hospitalization- continue to spend majority of day oob to wheelchair, okay to ambulate but must remain 50% weightbearing on RLE with NO KNEE MOTION allowed, knee immobilizer at all times if OOB    History of DVT (deep vein thrombosis).    - Start coumadin, INR slightly subtherapeutic at 1.8.   Will dose 2mg coumadin today and recheck INR in AM.     Morbid obesity.    - Start DASH diet  - Patient's mobility limited due to explant of total knee replacement    GERD (gastroesophageal reflux disease).    - Start home dose of pantoprazole.     VTE PPx  - on coumadin for hx of DVT

## 2018-10-23 NOTE — CONSULT NOTE ADULT - SUBJECTIVE AND OBJECTIVE BOX
HPI:   Patient is a 77y female who is essentially bedridden for nearly a year. She had a right tkr the got infected with strept constellatus about a year ago , had rudy and spacer. The wound failed to heal so she had exchange of spacer and grew mrsa. The wound then has taken many months to heal as well. She has been maintained on suppressive minocycline. She was diagnosed with pyoderma gangrenosa and develops various wounds over the hips and abdomen now controlled with steroid taper. She had appendicitis this past summer treated medically. She had aspiration pneumonia. She had picc associated cns bacteremia treated for 2 weeks with iv antibiotics and picc pulled. she has a tavr. She now returns from home this time with one day of fever to 103. She denies any localizing symptoms and just felt warm.     REVIEW OF SYSTEMS:  All other review of systems negative (Comprehensive ROS)    PAST MEDICAL & SURGICAL HISTORY:  CAD (coronary artery disease): HERBERT to LAD 2016  Aortic stenosis, severe  Uncomplicated asthma, unspecified asthma severity  Polymyalgia  Lumbar disc disease with radiculopathy  Spider veins  Anxiety  Severe aortic stenosis by prior echocardiogram:  and  2016  Dysphagia  Macular degeneration  Hiatal hernia  GERD (gastroesophageal reflux disease)  Sciatica  Osteoarthritis  S/P TKR (total knee replacement): joint infection s/p explant and abx spacer on 17  S/P TAVR (transcatheter aortic valve replacement): 17  Artificial cardiac pacemaker: 17  H/O cardiac catheterization: 16 HERBERT placed on LAD  H/O endoscopy: with dilatation of esophageal stricture   S/P cataract surgery: x2; 3-4yr ago  S/P gastroplasty: 28 yrs ago  S/P cholecystectomy: more than 15 years ago  S/P total knee replacement: left, 15yr ago  S/P total knee replacement: right, 12yr ago  S/P hysterectomy: 24yr ago      Allergies    amoxicillin (Other)    Intolerances    IV Contrast (Flushing (Skin); Nausea)      Antimicrobials Day #  :2  cefepime   IVPB 2000 milliGRAM(s) IV Intermittent every 12 hours  metroNIDAZOLE  IVPB 500 milliGRAM(s) IV Intermittent every 8 hours  metroNIDAZOLE  IVPB      vancomycin  IVPB 1000 milliGRAM(s) IV Intermittent every 12 hours    Other Medications:  acetaminophen   Tablet .. 650 milliGRAM(s) Oral every 4 hours PRN  ascorbic acid 500 milliGRAM(s) Oral daily  aspirin enteric coated 81 milliGRAM(s) Oral daily  buDESOnide 160 MICROgram(s)/formoterol 4.5 MICROgram(s) Inhaler 2 Puff(s) Inhalation two times a day  calcium carbonate 1250 mG  + Vitamin D (OsCal 500 + D) 1 Tablet(s) Oral three times a day  folic acid 1 milliGRAM(s) Oral daily  gabapentin 300 milliGRAM(s) Oral three times a day  loratadine 10 milliGRAM(s) Oral daily  melatonin 3 milliGRAM(s) Oral at bedtime  metoprolol tartrate 12.5 milliGRAM(s) Oral two times a day  multivitamin 1 Tablet(s) Oral daily  pantoprazole    Tablet 40 milliGRAM(s) Oral before breakfast  polyethylene glycol 3350 17 Gram(s) Oral daily PRN  simvastatin 20 milliGRAM(s) Oral at bedtime  traMADol 25 milliGRAM(s) Oral three times a day PRN      FAMILY HISTORY:  No pertinent family history in first degree relatives      SOCIAL HISTORY:  Smoking: [ ]Yes [x ]No  ETOH: [ ]Yes [ x]No  Drug Use: [ ]Yes [x ]No   [x ] Single[ ]    T(F): 98 (10-23-18 @ 12:52), Max: 103.2 (10-22-18 @ 15:45)  HR: 86 (10-23-18 @ 12:52)  BP: 103/66 (10-23-18 @ 12:52)  RR: 17 (10-23-18 @ 12:52)  SpO2: 98% (10-23-18 @ 12:52)  Wt(kg): --    PHYSICAL EXAM:  General: alert, no acute distress  Eyes:  anicteric, no conjunctival injection, no discharge  Oropharynx: no lesions or injection 	  Neck: supple, without adenopathy  Lungs: clear to auscultation  Heart: regular rate and rhythm; s1s2 2/6 sys m  Abdomen: soft, nondistended, nontender, without mass or organomegaly. clean superficial wound over pannus  Skin: no lesions  Extremities: bilateral clean hip wounds packed. right knee wound is superficial and clean  Neurologic: alert, oriented, moves all extremities    LAB RESULTS:                        9.8    10.9  )-----------( 385      ( 22 Oct 2018 15:51 )             30.5     10-22    140  |  106  |  11  ----------------------------<  97  4.6   |  22  |  0.60    Ca    8.9      22 Oct 2018 15:51    TPro  6.1  /  Alb  2.0<L>  /  TBili  0.3  /  DBili  x   /  AST  20  /  ALT  18  /  AlkPhos  107  10-22    LIVER FUNCTIONS - ( 22 Oct 2018 15:51 )  Alb: 2.0 g/dL / Pro: 6.1 g/dL / ALK PHOS: 107 U/L / ALT: 18 U/L / AST: 20 U/L / GGT: x           Urinalysis Basic - ( 22 Oct 2018 17:16 )    Color: Yellow / Appearance: Clear / S.024 / pH: x  Gluc: x / Ketone: Negative  / Bili: Negative / Urobili: Negative   Blood: x / Protein: 30 mg/dL / Nitrite: Negative   Leuk Esterase: Negative / RBC: 4 /hpf / WBC 2 /hpf   Sq Epi: x / Non Sq Epi: 2 /hpf / Bacteria: Negative        MICROBIOLOGY:  RECENT CULTURES:  Rapid Respiratory Viral Panel (10.22.18 @ 15:51)    Rapid RVP Result: NotDetec: The [employer] RVP Rapid uses polymerase chain reaction (PCR) and melt  curve analysis to screen for adenovirus; coronavirus HKU1, NL63, 229E,  OC43; human metapneumovirus (hMPV); human enterovirus/rhinovirus  (Entero/RV); influenza A; influenza A/H1;influenza A/H3; influenza  A/H1-2009; influenza B; parainfluenza viruses 1, 2, 3, 4; respiratory  syncytial virus; Bordetella pertussis; Mycoplasma pneumoniae; and  Chlamydophila pneumoniae.          RADIOLOGY REVIEWED:    < from: Xray Chest 1 View AP/PA (10.22.18 @ 16:34) >  Impression:    The heart is slightly enlarged. Elevated right hemidiaphragm. The lungs   appear to be clear. A pacer is in good position.        < end of copied text >        Impression:  Patient with obesity, tavr, pmr, pyoderma gangrenosa on steroid taper, suppressive minocycline for chronic right knee spacer infection, recent cns bacteremia from picc, recent appendicitis managed with abx alone returns with so far transient fever of unclear source. Viral is possible but no uri symptoms, aspiration event, translocation through various wounds, relapse of cns bacteremia/pve all possible. She was not very symptomatic with appendicitis so flare is possible too.     Recommendations:  For now will continue vanco cefepime and flagyl  follow up cultures  ct chest abd and pelvis  if no infection specifically found will limit antibiotic to short course and return to just minocycline

## 2018-10-24 ENCOUNTER — APPOINTMENT (OUTPATIENT)
Dept: ORTHOPEDIC SURGERY | Facility: CLINIC | Age: 78
End: 2018-10-24

## 2018-10-24 DIAGNOSIS — Z87.39 PERSONAL HISTORY OF OTHER DISEASES OF THE MUSCULOSKELETAL SYSTEM AND CONNECTIVE TISSUE: ICD-10-CM

## 2018-10-24 LAB
ANION GAP SERPL CALC-SCNC: 11 MMOL/L — SIGNIFICANT CHANGE UP (ref 5–17)
APTT BLD: 45.2 SEC — HIGH (ref 27.5–37.4)
BUN SERPL-MCNC: 11 MG/DL — SIGNIFICANT CHANGE UP (ref 7–23)
CALCIUM SERPL-MCNC: 8.8 MG/DL — SIGNIFICANT CHANGE UP (ref 8.4–10.5)
CHLORIDE SERPL-SCNC: 105 MMOL/L — SIGNIFICANT CHANGE UP (ref 96–108)
CO2 SERPL-SCNC: 23 MMOL/L — SIGNIFICANT CHANGE UP (ref 22–31)
CREAT SERPL-MCNC: 0.64 MG/DL — SIGNIFICANT CHANGE UP (ref 0.5–1.3)
GLUCOSE SERPL-MCNC: 111 MG/DL — HIGH (ref 70–99)
HCT VFR BLD CALC: 27 % — LOW (ref 34.5–45)
HGB BLD-MCNC: 8.1 G/DL — LOW (ref 11.5–15.5)
INR BLD: 2.42 RATIO — HIGH (ref 0.88–1.16)
MCHC RBC-ENTMCNC: 26.8 PG — LOW (ref 27–34)
MCHC RBC-ENTMCNC: 30 GM/DL — LOW (ref 32–36)
MCV RBC AUTO: 89.4 FL — SIGNIFICANT CHANGE UP (ref 80–100)
PLATELET # BLD AUTO: 377 K/UL — SIGNIFICANT CHANGE UP (ref 150–400)
POTASSIUM SERPL-MCNC: 3.8 MMOL/L — SIGNIFICANT CHANGE UP (ref 3.5–5.3)
POTASSIUM SERPL-SCNC: 3.8 MMOL/L — SIGNIFICANT CHANGE UP (ref 3.5–5.3)
PROTHROM AB SERPL-ACNC: 27.9 SEC — HIGH (ref 10–13.1)
RBC # BLD: 3.02 M/UL — LOW (ref 3.8–5.2)
RBC # FLD: 22.4 % — HIGH (ref 10.3–14.5)
SODIUM SERPL-SCNC: 139 MMOL/L — SIGNIFICANT CHANGE UP (ref 135–145)
VANCOMYCIN TROUGH SERPL-MCNC: 13.4 UG/ML — SIGNIFICANT CHANGE UP (ref 10–20)
WBC # BLD: 8.62 K/UL — SIGNIFICANT CHANGE UP (ref 3.8–10.5)
WBC # FLD AUTO: 8.62 K/UL — SIGNIFICANT CHANGE UP (ref 3.8–10.5)

## 2018-10-24 PROCEDURE — 85014 HEMATOCRIT: CPT

## 2018-10-24 PROCEDURE — 82330 ASSAY OF CALCIUM: CPT

## 2018-10-24 PROCEDURE — 93306 TTE W/DOPPLER COMPLETE: CPT | Mod: 26

## 2018-10-24 PROCEDURE — 84295 ASSAY OF SERUM SODIUM: CPT

## 2018-10-24 PROCEDURE — 82947 ASSAY GLUCOSE BLOOD QUANT: CPT

## 2018-10-24 PROCEDURE — 87102 FUNGUS ISOLATION CULTURE: CPT

## 2018-10-24 PROCEDURE — 87086 URINE CULTURE/COLONY COUNT: CPT

## 2018-10-24 PROCEDURE — 71045 X-RAY EXAM CHEST 1 VIEW: CPT

## 2018-10-24 PROCEDURE — 87116 MYCOBACTERIA CULTURE: CPT

## 2018-10-24 PROCEDURE — 97162 PT EVAL MOD COMPLEX 30 MIN: CPT

## 2018-10-24 PROCEDURE — 88305 TISSUE EXAM BY PATHOLOGIST: CPT

## 2018-10-24 PROCEDURE — 82728 ASSAY OF FERRITIN: CPT

## 2018-10-24 PROCEDURE — 74177 CT ABD & PELVIS W/CONTRAST: CPT

## 2018-10-24 PROCEDURE — 88312 SPECIAL STAINS GROUP 1: CPT

## 2018-10-24 PROCEDURE — 96374 THER/PROPH/DIAG INJ IV PUSH: CPT | Mod: XU

## 2018-10-24 PROCEDURE — 82435 ASSAY OF BLOOD CHLORIDE: CPT

## 2018-10-24 PROCEDURE — 87040 BLOOD CULTURE FOR BACTERIA: CPT

## 2018-10-24 PROCEDURE — 83605 ASSAY OF LACTIC ACID: CPT

## 2018-10-24 PROCEDURE — 99232 SBSQ HOSP IP/OBS MODERATE 35: CPT

## 2018-10-24 PROCEDURE — 83550 IRON BINDING TEST: CPT

## 2018-10-24 PROCEDURE — 97602 WOUND(S) CARE NON-SELECTIVE: CPT

## 2018-10-24 PROCEDURE — 97605 NEG PRS WND THER DME<=50SQCM: CPT

## 2018-10-24 PROCEDURE — 87015 SPECIMEN INFECT AGNT CONCNTJ: CPT

## 2018-10-24 PROCEDURE — 97110 THERAPEUTIC EXERCISES: CPT

## 2018-10-24 PROCEDURE — 87070 CULTURE OTHR SPECIMN AEROBIC: CPT

## 2018-10-24 PROCEDURE — 97530 THERAPEUTIC ACTIVITIES: CPT

## 2018-10-24 PROCEDURE — 87186 SC STD MICRODIL/AGAR DIL: CPT

## 2018-10-24 PROCEDURE — 85027 COMPLETE CBC AUTOMATED: CPT

## 2018-10-24 PROCEDURE — 81001 URINALYSIS AUTO W/SCOPE: CPT

## 2018-10-24 PROCEDURE — 96375 TX/PRO/DX INJ NEW DRUG ADDON: CPT | Mod: XU

## 2018-10-24 PROCEDURE — 86038 ANTINUCLEAR ANTIBODIES: CPT

## 2018-10-24 PROCEDURE — 97606 NEG PRS WND THER DME>50 SQCM: CPT

## 2018-10-24 PROCEDURE — 99285 EMERGENCY DEPT VISIT HI MDM: CPT | Mod: 25

## 2018-10-24 PROCEDURE — 80053 COMPREHEN METABOLIC PANEL: CPT

## 2018-10-24 PROCEDURE — 74177 CT ABD & PELVIS W/CONTRAST: CPT | Mod: 26

## 2018-10-24 PROCEDURE — 87206 SMEAR FLUORESCENT/ACID STAI: CPT

## 2018-10-24 PROCEDURE — 97161 PT EVAL LOW COMPLEX 20 MIN: CPT

## 2018-10-24 PROCEDURE — 84132 ASSAY OF SERUM POTASSIUM: CPT

## 2018-10-24 PROCEDURE — 71260 CT THORAX DX C+: CPT

## 2018-10-24 PROCEDURE — 85610 PROTHROMBIN TIME: CPT

## 2018-10-24 PROCEDURE — 82803 BLOOD GASES ANY COMBINATION: CPT

## 2018-10-24 PROCEDURE — 80048 BASIC METABOLIC PNL TOTAL CA: CPT

## 2018-10-24 PROCEDURE — 73560 X-RAY EXAM OF KNEE 1 OR 2: CPT

## 2018-10-24 PROCEDURE — 93005 ELECTROCARDIOGRAM TRACING: CPT

## 2018-10-24 PROCEDURE — 94640 AIRWAY INHALATION TREATMENT: CPT

## 2018-10-24 PROCEDURE — 83010 ASSAY OF HAPTOGLOBIN QUANT: CPT

## 2018-10-24 PROCEDURE — 96376 TX/PRO/DX INJ SAME DRUG ADON: CPT | Mod: XU

## 2018-10-24 PROCEDURE — 71260 CT THORAX DX C+: CPT | Mod: 26

## 2018-10-24 PROCEDURE — 83735 ASSAY OF MAGNESIUM: CPT

## 2018-10-24 PROCEDURE — 86147 CARDIOLIPIN ANTIBODY EA IG: CPT

## 2018-10-24 PROCEDURE — 85730 THROMBOPLASTIN TIME PARTIAL: CPT

## 2018-10-24 PROCEDURE — 87075 CULTR BACTERIA EXCEPT BLOOD: CPT

## 2018-10-24 PROCEDURE — 82607 VITAMIN B-12: CPT

## 2018-10-24 RX ORDER — VANCOMYCIN HCL 1 G
1000 VIAL (EA) INTRAVENOUS DAILY
Qty: 0 | Refills: 0 | Status: DISCONTINUED | OUTPATIENT
Start: 2018-10-25 | End: 2018-10-25

## 2018-10-24 RX ORDER — WARFARIN SODIUM 2.5 MG/1
1 TABLET ORAL ONCE
Qty: 0 | Refills: 0 | Status: DISCONTINUED | OUTPATIENT
Start: 2018-10-24 | End: 2018-10-24

## 2018-10-24 RX ADMIN — Medication 81 MILLIGRAM(S): at 11:48

## 2018-10-24 RX ADMIN — TRAMADOL HYDROCHLORIDE 25 MILLIGRAM(S): 50 TABLET ORAL at 06:02

## 2018-10-24 RX ADMIN — GABAPENTIN 300 MILLIGRAM(S): 400 CAPSULE ORAL at 13:48

## 2018-10-24 RX ADMIN — Medication 1 TABLET(S): at 21:03

## 2018-10-24 RX ADMIN — TRAMADOL HYDROCHLORIDE 25 MILLIGRAM(S): 50 TABLET ORAL at 18:46

## 2018-10-24 RX ADMIN — TRAMADOL HYDROCHLORIDE 25 MILLIGRAM(S): 50 TABLET ORAL at 06:30

## 2018-10-24 RX ADMIN — Medication 3 MILLIGRAM(S): at 21:03

## 2018-10-24 RX ADMIN — Medication 12.5 MILLIGRAM(S): at 17:48

## 2018-10-24 RX ADMIN — Medication 100 MILLIGRAM(S): at 21:04

## 2018-10-24 RX ADMIN — CEFEPIME 100 MILLIGRAM(S): 1 INJECTION, POWDER, FOR SOLUTION INTRAMUSCULAR; INTRAVENOUS at 22:32

## 2018-10-24 RX ADMIN — Medication 1 TABLET(S): at 13:48

## 2018-10-24 RX ADMIN — Medication 100 MILLIGRAM(S): at 13:48

## 2018-10-24 RX ADMIN — Medication 250 MILLIGRAM(S): at 17:48

## 2018-10-24 RX ADMIN — Medication 1 TABLET(S): at 06:22

## 2018-10-24 RX ADMIN — TRAMADOL HYDROCHLORIDE 25 MILLIGRAM(S): 50 TABLET ORAL at 19:14

## 2018-10-24 RX ADMIN — Medication 100 MILLIGRAM(S): at 06:24

## 2018-10-24 RX ADMIN — PANTOPRAZOLE SODIUM 40 MILLIGRAM(S): 20 TABLET, DELAYED RELEASE ORAL at 06:22

## 2018-10-24 RX ADMIN — BUDESONIDE AND FORMOTEROL FUMARATE DIHYDRATE 2 PUFF(S): 160; 4.5 AEROSOL RESPIRATORY (INHALATION) at 06:22

## 2018-10-24 RX ADMIN — Medication 650 MILLIGRAM(S): at 21:33

## 2018-10-24 RX ADMIN — SIMVASTATIN 20 MILLIGRAM(S): 20 TABLET, FILM COATED ORAL at 21:03

## 2018-10-24 RX ADMIN — BUDESONIDE AND FORMOTEROL FUMARATE DIHYDRATE 2 PUFF(S): 160; 4.5 AEROSOL RESPIRATORY (INHALATION) at 17:48

## 2018-10-24 RX ADMIN — GABAPENTIN 300 MILLIGRAM(S): 400 CAPSULE ORAL at 06:22

## 2018-10-24 RX ADMIN — CEFEPIME 100 MILLIGRAM(S): 1 INJECTION, POWDER, FOR SOLUTION INTRAMUSCULAR; INTRAVENOUS at 11:42

## 2018-10-24 RX ADMIN — Medication 12.5 MILLIGRAM(S): at 06:22

## 2018-10-24 RX ADMIN — Medication 250 MILLIGRAM(S): at 06:23

## 2018-10-24 RX ADMIN — Medication 1 TABLET(S): at 11:42

## 2018-10-24 RX ADMIN — GABAPENTIN 300 MILLIGRAM(S): 400 CAPSULE ORAL at 21:03

## 2018-10-24 RX ADMIN — Medication 650 MILLIGRAM(S): at 21:03

## 2018-10-24 RX ADMIN — Medication 500 MILLIGRAM(S): at 11:48

## 2018-10-24 RX ADMIN — Medication 1 MILLIGRAM(S): at 11:48

## 2018-10-24 RX ADMIN — CEFEPIME 100 MILLIGRAM(S): 1 INJECTION, POWDER, FOR SOLUTION INTRAMUSCULAR; INTRAVENOUS at 01:07

## 2018-10-24 RX ADMIN — LORATADINE 10 MILLIGRAM(S): 10 TABLET ORAL at 11:42

## 2018-10-24 NOTE — PROGRESS NOTE ADULT - ASSESSMENT
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      R/O Wound infection.    -  Wound care consult   - Start vancomycin 1g q12 based on GFR and weight, and check trough pre- 4th dose  - Continue cefepime and  flagyl.  - Follow up blood culture results.   - d/w Id consult    SIRS (systemic inflammatory response syndrome).    - Pt meets SIRS criteria based on HR and temperature  - Follow up blood cultures and urine culture  - Low suspicion for pulmonary, GI or  source of infection given that patient has no cough, SOB, abd pain , N/V, dysuria.  - ct of C/A/P done  - Suspect likely wound infection.    Pyoderma gangrenosa.    will consider Consult in dermatology to determine if biopsy necessary to clarify working diagnosis of pyoderma gangrenosa. Pt had been tapered off steroids at last visit- unclear if this should be restarted .    Chronic diastolic heart failure.    -  Patient found to have moderate diastolic dysfunction on last TTE in 10/2018  - Will cont home aspirin and metoprolol  - Maintain euvolemic status.     CAD (coronary artery disease).   - cont  home dose of aspirin and metoprolol.     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   -  Pt had home dose of minocycline for suppressive therapy due to infection of TKR  - Will hold minocycline and start IV   - Per orthopedic team at last hospitalization- continue to spend majority of day oob to wheelchair, okay to ambulate but must remain 50% weightbearing on RLE with NO KNEE MOTION allowed, knee immobilizer at all times if OOB    History of DVT (deep vein thrombosis).    - Start coumadin, INR slightly subtherapeutic at 1.8.   Will dose 2mg coumadin today and recheck INR in AM.     Morbid obesity.    - Start DASH diet  - Patient's mobility limited due to explant of total knee replacement    GERD (gastroesophageal reflux disease).    - cont home dose of pantoprazole.     VTE PPx  - on coumadin for hx of DVT

## 2018-10-24 NOTE — CONSULT NOTE ADULT - ASSESSMENT
77F Hx CAD s/p stent to LAD, AS s/p TAVR, PPM, DVT (Dx 1/18) on coumadin, and recent admission for infected right knee hardware and bacteremia presented to the hospital with fevers and chronic lower extremity wounds.     - Patient has multiple medical comorbidities and would likely not tolerate a procedure well. Furthermore, the CT finding of thickened appendiceal tail appears unchanged from prior study 6/7/18 and the patient does not have signs/symptoms of appendicitis.   - Continue with IV antibiotics. 77F Hx CAD s/p stent to LAD, AS s/p TAVR, PPM, DVT (Dx 1/18) on coumadin, and recent admission for infected right knee hardware and bacteremia presented to the hospital with fevers and chronic lower extremity wounds.     - Patient has multiple medical comorbidities and would likely not tolerate a procedure well. Furthermore, the CT finding of thickened appendiceal tail appears unchanged from prior study 6/7/18 and the patient does not have signs/symptoms of appendicitis. Patient can follow up as an outpatient for monitoring of possible appendiceal lesion.   - Continue with IV antibiotics.   - Consider IR drainage of right flank collection as this may be a source of fever.   - Discussed with attending. 77F Hx CAD s/p stent to LAD, AS s/p TAVR, PPM, DVT (Dx 1/18) on coumadin, and recent admission for infected right knee hardware and bacteremia presented to the hospital with fevers and chronic lower extremity wounds.     - Patient has multiple medical comorbidities and would likely not tolerate a procedure well. Furthermore, the CT finding of thickened appendiceal tail appears unchanged from prior study 6/7/18 and the patient does not have signs/symptoms of appendicitis. Patient can follow up as an outpatient for monitoring of possible appendiceal lesion.   - Continue with IV antibiotics.   - Patient pending ultrasound of right flank collection. Pending results, consider IR drainage of right flank collection as this may be a source of fever.   - Discussed with attending.

## 2018-10-24 NOTE — PROGRESS NOTE ADULT - ASSESSMENT
76 yo F w/ pmh HTN, CAD with PPM, and multiple chronic wounds and resolving pyoderma gangrenosum, presents after being found hyptensive to 60s. Pt w/ multiple wounds on lower extremities.     - continue dressing changes - aquacel on R knee  - f/u wound care  - f/u ID  - f/u derm consult  - care per primary team

## 2018-10-24 NOTE — PROGRESS NOTE ADULT - SUBJECTIVE AND OBJECTIVE BOX
CC: f/u for fever    Patient reports feels ok today    REVIEW OF SYSTEMS:  All other review of systems negative (Comprehensive ROS)    Antimicrobials Day #  :2  cefepime   IVPB 2000 milliGRAM(s) IV Intermittent every 12 hours  metroNIDAZOLE  IVPB 500 milliGRAM(s) IV Intermittent every 8 hours  metroNIDAZOLE  IVPB      vancomycin  IVPB 1000 milliGRAM(s) IV Intermittent every 12 hours    Other Medications Reviewed    T(F): 99.3 (10-24-18 @ 13:10), Max: 101.1 (10-23-18 @ 18:14)  HR: 81 (10-24-18 @ 13:10)  BP: 127/76 (10-24-18 @ 13:10)  RR: 18 (10-24-18 @ 13:10)  SpO2: 97% (10-24-18 @ 13:10)  Wt(kg): --    PHYSICAL EXAM:  General: alert, no acute distress  Eyes:  anicteric, no conjunctival injection, no discharge  Oropharynx: no lesions or injection 	  Neck: supple, without adenopathy  Lungs: clear to auscultation  Heart: regular rate and rhythm; no murmur, rubs or gallops  Abdomen: soft, nondistended, nontender, without mass or organomegaly. wound clean  Skin: no lesions  Extremities: no clubbing, cyanosis, wounds clean  Neurologic: alert, moves all extremities    LAB RESULTS:                        8.1    8.62  )-----------( 377      ( 24 Oct 2018 09:14 )             27.0     10-24    139  |  105  |  11  ----------------------------<  111<H>  3.8   |  23  |  0.64    Ca    8.8      24 Oct 2018 05:01          MICROBIOLOGY:  RECENT CULTURES:  10-22 @ 19:22 .Urine Clean Catch (Midstream)     <10,000 CFU/ml Normal Urogenital roopa present      10-22 @ 19:01 .Blood Blood-Peripheral     No growth to date.      10-22 @ 18:10 .Blood Blood-Peripheral     No growth to date.          RADIOLOGY REVIEWED:    < from: CT Abdomen and Pelvis w/ Oral Cont and w/ IV Cont (10.24.18 @ 10:56) >  EXAM:  CT ABDOMEN AND PELVIS OC IC                          EXAM:  CT CHEST IC                            PROCEDURE DATE:  10/24/2018            INTERPRETATION:  CLINICAL INFORMATION: Fever of unknown origin.    COMPARISON: CTA chest, abdomen and pelvis dated 6/7/2018. CT abdomen   pelvis 11/7/2009.    PROCEDURE:   CT of the Chest, Abdomen and Pelvis was performed with intravenous   contrast.   Intravenous contrast: 90 ml Omnipaque 350. 10 ml discarded.  Oral contrast: positive contrast was administered.  Sagittal and coronal reformats were performed.    FINDINGS:    CHEST:     LUNGS AND LARGE AIRWAYS: Patent central airways. No focal consolidation.   Scattered bilateral calcified granulomas.  PLEURA: Small left and trace right pleural effusions.  VESSELS: Status post TAVR. Coronary calcifications with coronary stent.  HEART: Heart size is normal. No pericardial effusion.  MEDIASTINUM AND PEGGY: No lymphadenopathy.  CHEST WALL AND LOWER NECK: Left chest wall pacemaker.     ABDOMEN AND PELVIS:    LIVER: Hepatic steatosis. A 2.8 cm indeterminate left lateral hepatic   lobe lesion, unchanged from 11/7/2009. Additional subcentimeter   hypodensities, too small to characterize.   BILE DUCTS: Normal caliber.  GALLBLADDER: Cholecystectomy.  SPLEEN: Multiple low attenuating lesions, not significantly changed from   11/7/2009.  PANCREAS: Within normal limits.  ADRENALS: Within normal limits.  KIDNEYS/URETERS: A 1.8 cm enhancing lesion in the left lower pole not   significantly changed dating to 11/7/2009.    BLADDER: Within normal limits.  REPRODUCTIVE ORGANS: Hysterectomy. No adnexal mass.    BOWEL: No bowel obstruction. Status post gastroplasty. The appendix fills   with contrast and its proximal portion. There is persistent wall   thickening at the appendiceal tip, unchanged from prior study 6/7/2018.   Differential includes chronic inflammation versus a focal lesion.   PERITONEUM: No ascites.  VESSELS:  Infrarenal IVC filter. Atherosclerotic changes..  RETROPERITONEUM: No lymphadenopathy.    ABDOMINAL WALL: Ill-defined fluid collections within the subcutaneous   tissues of the right flank, partially visualized. Small lower ventral   abdominal hernia containing nonobstructed small bowel and fat.  BONES: Degenerative changes.    IMPRESSION:     Ill-defined fluid collections within the subcutaneous tissues of the   right flank, partially visualized. Consider correlation with ultrasound.   Hematoma is a differential consideration.    Persistent wall thickening of the appendiceal tail is unchanged from   prior study 6/7/2018. Differential includes chronic inflammation versus a   focal lesion.    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 Maybe the chronic appendicitis, maybe the collection   Plan: for now continue vanco, cefepime, flagyl  follow up culture  US abdomen to look at collection  surgery evaluation of appendix  repeat echo to be sure no evident of endocarditis given recent bacteremia

## 2018-10-24 NOTE — CONSULT NOTE ADULT - SUBJECTIVE AND OBJECTIVE BOX
General Surgery Consult  Consulting surgical team: [facility]  Consulting attending: Dr. 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 Patient had been hospitalized from 9/25-10/5/2018 for septic shock, attributed to coag negative staph bacteremia.    Patient was discharged with visiting nurse services. Pt states that she developed poor appetite, new onset fever and fatigue at home and thus EMS was called. She denies cough, CP, N/V, diarrhea or constipation, abd pain, dysuria.       PAST MEDICAL HISTORY:  CAD (coronary artery disease)  Aortic stenosis, severe  Uncomplicated asthma, unspecified asthma severity  Polymyalgia  Lumbar disc disease with radiculopathy  Spider veins  Anxiety  Severe aortic stenosis by prior echocardiogram  Dysphagia  Morbid obesity with BMI of 50.0-59.9, adult  Macular degeneration  Hiatal hernia  GERD (gastroesophageal reflux disease)  Sciatica  Osteoarthritis  HTN (hypertension)      PAST SURGICAL HISTORY:  S/P TKR (total knee replacement)  S/P TAVR (transcatheter aortic valve replacement)  Artificial cardiac pacemaker  H/O cardiac catheterization  H/O endoscopy  S/P cataract surgery  S/P gastroplasty  S/P cholecystectomy  S/P total knee replacement  S/P total knee replacement  S/P hysterectomy      MEDICATIONS:  acetaminophen   Tablet .. 650 milliGRAM(s) Oral every 4 hours PRN  ascorbic acid 500 milliGRAM(s) Oral daily  aspirin enteric coated 81 milliGRAM(s) Oral daily  buDESOnide 160 MICROgram(s)/formoterol 4.5 MICROgram(s) Inhaler 2 Puff(s) Inhalation two times a day  calcium carbonate 1250 mG  + Vitamin D (OsCal 500 + D) 1 Tablet(s) Oral three times a day  cefepime   IVPB 2000 milliGRAM(s) IV Intermittent every 12 hours  folic acid 1 milliGRAM(s) Oral daily  gabapentin 300 milliGRAM(s) Oral three times a day  loratadine 10 milliGRAM(s) Oral daily  melatonin 3 milliGRAM(s) Oral at bedtime  metoprolol tartrate 12.5 milliGRAM(s) Oral two times a day  metroNIDAZOLE  IVPB 500 milliGRAM(s) IV Intermittent every 8 hours  metroNIDAZOLE  IVPB      multivitamin 1 Tablet(s) Oral daily  pantoprazole    Tablet 40 milliGRAM(s) Oral before breakfast  polyethylene glycol 3350 17 Gram(s) Oral daily PRN  simvastatin 20 milliGRAM(s) Oral at bedtime  traMADol 25 milliGRAM(s) Oral three times a day PRN      ALLERGIES:  amoxicillin (Other)  IV Contrast (Flushing (Skin); Nausea)      VITALS & I/Os:  Vital Signs Last 24 Hrs  T(C): 37.4 (24 Oct 2018 13:10), Max: 37.4 (24 Oct 2018 13:10)  T(F): 99.3 (24 Oct 2018 13:10), Max: 99.3 (24 Oct 2018 13:10)  HR: 81 (24 Oct 2018 13:10) (81 - 84)  BP: 127/76 (24 Oct 2018 13:10) (123/81 - 127/76)  BP(mean): --  RR: 18 (24 Oct 2018 13:10) (18 - 18)  SpO2: 97% (24 Oct 2018 13:10) (92% - 100%)    I&O's Summary    23 Oct 2018 07:01  -  24 Oct 2018 07:00  --------------------------------------------------------  IN: 390 mL / OUT: 200 mL / NET: 190 mL    24 Oct 2018 07:01  -  24 Oct 2018 20:46  --------------------------------------------------------  IN: 1170 mL / OUT: 400 mL / NET: 770 mL        PHYSICAL EXAM:  General: No acute distress  Respiratory: Nonlabored on NC  Cardiovascular: RRR  Abdominal: obese abdomen, Soft, NTND. Soft tissue wound superior to right inguinal region with CDI dressing.   Extremities: Warm    LABS:                        8.1    8.62  )-----------( 377      ( 24 Oct 2018 09:14 )             27.0     10-24    139  |  105  |  11  ----------------------------<  111<H>  3.8   |  23  |  0.64    Ca    8.8      24 Oct 2018 05:01      Lactate:    PT/INR - ( 24 Oct 2018 07:54 )   PT: 27.9 sec;   INR: 2.42 ratio         PTT - ( 24 Oct 2018 07:54 )  PTT:45.2 sec      IMAGING:    < from: CT Abdomen and Pelvis w/ Oral Cont and w/ IV Cont (10.24.18 @ 10:56) >  IMPRESSION:     Ill-defined fluid collections within the subcutaneous tissues of the   right flank, partially visualized. Consider correlation with ultrasound.   Hematoma is a differential consideration.    Persistent wall thickening of the appendiceal tail is unchanged from   prior study 6/7/2018. Differential includes chronic inflammation versus a   focal lesion.    < end of copied text >

## 2018-10-24 NOTE — PROGRESS NOTE ADULT - SUBJECTIVE AND OBJECTIVE BOX
has some r leg pain but overall controlled    afvss  nad  abdominal wound - significantly healed, care per wound care  RLE  thigh - significant healing, dressing per wound care teams  knee wound now with dry dressing, epithelializing, almost completely covered no drainage  remainder of wounds dressed by wound care  5/5 ta/ehl/gcs  silt l4-s1  2+ dp pulse

## 2018-10-24 NOTE — PROGRESS NOTE ADULT - SUBJECTIVE AND OBJECTIVE BOX
Patient is a 77y old  Female who presents with a chief complaint of fever (24 Oct 2018 07:21)      SUBJECTIVE / OVERNIGHT EVENTS:    MEDICATIONS  (STANDING):  ascorbic acid 500 milliGRAM(s) Oral daily  aspirin enteric coated 81 milliGRAM(s) Oral daily  buDESOnide 160 MICROgram(s)/formoterol 4.5 MICROgram(s) Inhaler 2 Puff(s) Inhalation two times a day  calcium carbonate 1250 mG  + Vitamin D (OsCal 500 + D) 1 Tablet(s) Oral three times a day  cefepime   IVPB 2000 milliGRAM(s) IV Intermittent every 12 hours  folic acid 1 milliGRAM(s) Oral daily  gabapentin 300 milliGRAM(s) Oral three times a day  loratadine 10 milliGRAM(s) Oral daily  melatonin 3 milliGRAM(s) Oral at bedtime  metoprolol tartrate 12.5 milliGRAM(s) Oral two times a day  metroNIDAZOLE  IVPB 500 milliGRAM(s) IV Intermittent every 8 hours  metroNIDAZOLE  IVPB      multivitamin 1 Tablet(s) Oral daily  pantoprazole    Tablet 40 milliGRAM(s) Oral before breakfast  simvastatin 20 milliGRAM(s) Oral at bedtime  vancomycin  IVPB 1000 milliGRAM(s) IV Intermittent every 12 hours    MEDICATIONS  (PRN):  acetaminophen   Tablet .. 650 milliGRAM(s) Oral every 4 hours PRN Temp greater or equal to 38C (100.4F), Mild Pain (1 - 3)  polyethylene glycol 3350 17 Gram(s) Oral daily PRN Constipation  traMADol 25 milliGRAM(s) Oral three times a day PRN Moderate Pain (4 - 6)      Vital Signs Last 24 Hrs  T(C): 37.4 (24 Oct 2018 13:10), Max: 38.4 (23 Oct 2018 18:14)  T(F): 99.3 (24 Oct 2018 13:10), Max: 101.1 (23 Oct 2018 18:14)  HR: 81 (24 Oct 2018 13:10) (81 - 91)  BP: 127/76 (24 Oct 2018 13:10) (123/81 - 141/82)  BP(mean): --  RR: 18 (24 Oct 2018 13:10) (18 - 18)  SpO2: 97% (24 Oct 2018 13:10) (92% - 100%)  CAPILLARY BLOOD GLUCOSE        I&O's Summary    23 Oct 2018 07:01  -  24 Oct 2018 07:00  --------------------------------------------------------  IN: 390 mL / OUT: 200 mL / NET: 190 mL    24 Oct 2018 07:01  -  24 Oct 2018 16:24  --------------------------------------------------------  IN: 680 mL / OUT: 200 mL / NET: 480 mL        PHYSICAL EXAM:  GENERAL: NAD, well-developed  HEAD:  Atraumatic, Normocephalic  EYES: EOMI, PERRLA, conjunctiva and sclera clear  NECK: Supple, No JVD  CHEST/LUNG: Clear to auscultation bilaterally; No wheeze  HEART: Regular rate and rhythm; No murmurs, rubs, or gallops  ABDOMEN: Soft, Nontender, Nondistended; Bowel sounds present  EXTREMITIES:  2+ Peripheral Pulses, No clubbing, cyanosis, or edema  PSYCH: AAOx3  NEUROLOGY: non-focal  SKIN: No rashes or lesions    LABS:                        8.1    8.62  )-----------( 377      ( 24 Oct 2018 09:14 )             27.0     10-24    139  |  105  |  11  ----------------------------<  111<H>  3.8   |  23  |  0.64    Ca    8.8      24 Oct 2018 05:01      PT/INR - ( 24 Oct 2018 07:54 )   PT: 27.9 sec;   INR: 2.42 ratio         PTT - ( 24 Oct 2018 07:54 )  PTT:45.2 sec      Urinalysis Basic - ( 22 Oct 2018 17:16 )    Color: Yellow / Appearance: Clear / S.024 / pH: x  Gluc: x / Ketone: Negative  / Bili: Negative / Urobili: Negative   Blood: x / Protein: 30 mg/dL / Nitrite: Negative   Leuk Esterase: Negative / RBC: 4 /hpf / WBC 2 /hpf   Sq Epi: x / Non Sq Epi: 2 /hpf / Bacteria: Negative        RADIOLOGY & ADDITIONAL TESTS:    Imaging Personally Reviewed:    Consultant(s) Notes Reviewed:      Care Discussed with Consultants/Other Providers: Patient is a 77y old  Female who presents with a chief complaint of fever (24 Oct 2018 07:21)      SUBJECTIVE / OVERNIGHT EVENTS:  no complaints    MEDICATIONS  (STANDING):  ascorbic acid 500 milliGRAM(s) Oral daily  aspirin enteric coated 81 milliGRAM(s) Oral daily  buDESOnide 160 MICROgram(s)/formoterol 4.5 MICROgram(s) Inhaler 2 Puff(s) Inhalation two times a day  calcium carbonate 1250 mG  + Vitamin D (OsCal 500 + D) 1 Tablet(s) Oral three times a day  cefepime   IVPB 2000 milliGRAM(s) IV Intermittent every 12 hours  folic acid 1 milliGRAM(s) Oral daily  gabapentin 300 milliGRAM(s) Oral three times a day  loratadine 10 milliGRAM(s) Oral daily  melatonin 3 milliGRAM(s) Oral at bedtime  metoprolol tartrate 12.5 milliGRAM(s) Oral two times a day  metroNIDAZOLE  IVPB 500 milliGRAM(s) IV Intermittent every 8 hours  metroNIDAZOLE  IVPB      multivitamin 1 Tablet(s) Oral daily  pantoprazole    Tablet 40 milliGRAM(s) Oral before breakfast  simvastatin 20 milliGRAM(s) Oral at bedtime  vancomycin  IVPB 1000 milliGRAM(s) IV Intermittent every 12 hours    MEDICATIONS  (PRN):  acetaminophen   Tablet .. 650 milliGRAM(s) Oral every 4 hours PRN Temp greater or equal to 38C (100.4F), Mild Pain (1 - 3)  polyethylene glycol 3350 17 Gram(s) Oral daily PRN Constipation  traMADol 25 milliGRAM(s) Oral three times a day PRN Moderate Pain (4 - 6)      Vital Signs Last 24 Hrs  T(C): 37.4 (24 Oct 2018 13:10), Max: 38.4 (23 Oct 2018 18:14)  T(F): 99.3 (24 Oct 2018 13:10), Max: 101.1 (23 Oct 2018 18:14)  HR: 81 (24 Oct 2018 13:10) (81 - 91)  BP: 127/76 (24 Oct 2018 13:10) (123/81 - 141/82)  BP(mean): --  RR: 18 (24 Oct 2018 13:10) (18 - 18)  SpO2: 97% (24 Oct 2018 13:10) (92% - 100%)  CAPILLARY BLOOD GLUCOSE        I&O's Summary    23 Oct 2018 07:01  -  24 Oct 2018 07:00  --------------------------------------------------------  IN: 390 mL / OUT: 200 mL / NET: 190 mL    24 Oct 2018 07:  -  24 Oct 2018 16:24  --------------------------------------------------------  IN: 680 mL / OUT: 200 mL / NET: 480 mL        PHYSICAL EXAM:  GENERAL: NAD, well-developed obese  HEAD:  Atraumatic, Normocephalic  EYES: EOMI, PERRLA, conjunctiva and sclera clear  NECK: Supple, No JVD  CHEST/LUNG: Clear to auscultation bilaterally; No wheeze  HEART: Regular rate and rhythm; No murmurs, rubs, or gallops  ABDOMEN: Soft, Nontender, Nondistended; Bowel sounds present  EXTREMITIES:  2+ Peripheral Pulses, No clubbing, cyanosis, or edema  PSYCH: AAOx3  NEUROLOGY: non-focal  SKIN: multiple wounds on lower ext and back    LABS:                        8.1    8.62  )-----------( 377      ( 24 Oct 2018 09:14 )             27.0     10-24    139  |  105  |  11  ----------------------------<  111<H>  3.8   |  23  |  0.64    Ca    8.8      24 Oct 2018 05:01      PT/INR - ( 24 Oct 2018 07:54 )   PT: 27.9 sec;   INR: 2.42 ratio         PTT - ( 24 Oct 2018 07:54 )  PTT:45.2 sec      Urinalysis Basic - ( 22 Oct 2018 17:16 )    Color: Yellow / Appearance: Clear / S.024 / pH: x  Gluc: x / Ketone: Negative  / Bili: Negative / Urobili: Negative   Blood: x / Protein: 30 mg/dL / Nitrite: Negative   Leuk Esterase: Negative / RBC: 4 /hpf / WBC 2 /hpf   Sq Epi: x / Non Sq Epi: 2 /hpf / Bacteria: Negative        RADIOLOGY & ADDITIONAL TESTS:    Imaging Personally Reviewed:    < from: CT Abdomen and Pelvis w/ Oral Cont and w/ IV Cont (10.24.18 @ 10:56) >    CHEST:     LUNGS AND LARGE AIRWAYS: Patent central airways. No focal consolidation.   Scattered bilateral calcified granulomas.  PLEURA: Small left and trace right pleural effusions.  VESSELS: Status post TAVR. Coronary calcifications with coronary stent.  HEART: Heart size is normal. No pericardial effusion.  MEDIASTINUM AND PEGGY: No lymphadenopathy.  CHEST WALL AND LOWER NECK: Left chest wall pacemaker.     ABDOMEN AND PELVIS:    LIVER: Hepatic steatosis. A 2.8 cm indeterminate left lateral hepatic   lobe lesion, unchanged from 2009. Additional subcentimeter   hypodensities, too small to characterize.   BILE DUCTS: Normal caliber.  GALLBLADDER: Cholecystectomy.  SPLEEN: Multiple low attenuating lesions, not significantly changed from   2009.  PANCREAS: Within normal limits.  ADRENALS: Within normal limits.  KIDNEYS/URETERS: A 1.8 cm enhancing lesion in the left lower pole not   significantly changed dating to 2009.    BLADDER: Within normal limits.  REPRODUCTIVE ORGANS: Hysterectomy. No adnexal mass.    BOWEL: No bowel obstruction. Status post gastroplasty. The appendix fills   with contrast and its proximal portion. There is persistent wall   thickening at the appendiceal tip, unchanged from prior study 2018.   Differential includes chronic inflammation versus a focal lesion.   PERITONEUM: No ascites.  VESSELS:  Infrarenal IVC filter. Atherosclerotic changes..  RETROPERITONEUM: No lymphadenopathy.    ABDOMINAL WALL: Ill-defined fluid collections within the subcutaneous   tissues of the right flank, partially visualized. Small lower ventral   abdominal hernia containing nonobstructed small bowel and fat.  BONES: Degenerative changes.    IMPRESSION:     Ill-defined fluid collections within the subcutaneous tissues of the   right flank, partially visualized. Consider correlation with ultrasound.   Hematoma is a differential consideration.    Persistent wall thickening of the appendiceal tail is unchanged from   prior study 2018. Differential includes chronic inflammation versus a   focal lesion.          < end of copied text >    Consultant(s) Notes Reviewed:      Care Discussed with Consultants/Other Providers:

## 2018-10-24 NOTE — PROGRESS NOTE ADULT - SUBJECTIVE AND OBJECTIVE BOX
GENERAL SURGERY DAILY PROGRESS NOTE:     Subjective:  Pt seen and examined at bedside. No acute events overnight.     Objective:  NAD  bilateral groin w/ serosanguinous stained dressing  RLE w/ draining wound w/ granular tissue    MEDICATIONS  (STANDING):  ascorbic acid 500 milliGRAM(s) Oral daily  aspirin enteric coated 81 milliGRAM(s) Oral daily  buDESOnide 160 MICROgram(s)/formoterol 4.5 MICROgram(s) Inhaler 2 Puff(s) Inhalation two times a day  calcium carbonate 1250 mG  + Vitamin D (OsCal 500 + D) 1 Tablet(s) Oral three times a day  cefepime   IVPB 2000 milliGRAM(s) IV Intermittent every 12 hours  folic acid 1 milliGRAM(s) Oral daily  gabapentin 300 milliGRAM(s) Oral three times a day  loratadine 10 milliGRAM(s) Oral daily  melatonin 3 milliGRAM(s) Oral at bedtime  metoprolol tartrate 12.5 milliGRAM(s) Oral two times a day  metroNIDAZOLE  IVPB 500 milliGRAM(s) IV Intermittent every 8 hours  metroNIDAZOLE  IVPB      multivitamin 1 Tablet(s) Oral daily  pantoprazole    Tablet 40 milliGRAM(s) Oral before breakfast  simvastatin 20 milliGRAM(s) Oral at bedtime  vancomycin  IVPB 1000 milliGRAM(s) IV Intermittent every 12 hours    MEDICATIONS  (PRN):  acetaminophen   Tablet .. 650 milliGRAM(s) Oral every 4 hours PRN Temp greater or equal to 38C (100.4F), Mild Pain (1 - 3)  polyethylene glycol 3350 17 Gram(s) Oral daily PRN Constipation  traMADol 25 milliGRAM(s) Oral three times a day PRN Moderate Pain (4 - 6)      Vital Signs Last 24 Hrs  T(C): 36.4 (24 Oct 2018 04:53), Max: 38.4 (23 Oct 2018 18:14)  T(F): 97.6 (24 Oct 2018 04:53), Max: 101.1 (23 Oct 2018 18:14)  HR: 82 (24 Oct 2018 04:53) (82 - 91)  BP: 123/81 (24 Oct 2018 04:53) (103/66 - 141/82)  BP(mean): --  RR: 18 (24 Oct 2018 04:53) (17 - 18)  SpO2: 100% (24 Oct 2018 04:53) (92% - 100%)    I&O's Detail    23 Oct 2018 07:01  -  24 Oct 2018 07:00  --------------------------------------------------------  IN:    IV PiggyBack: 150 mL    Oral Fluid: 240 mL  Total IN: 390 mL    OUT:    Voided: 200 mL  Total OUT: 200 mL    Total NET: 190 mL          Daily     Daily Weight in k.9 (23 Oct 2018 11:33)    LABS:                        9.8    10.9  )-----------( 385      ( 22 Oct 2018 15:51 )             30.5     10-24    139  |  105  |  11  ----------------------------<  111<H>  3.8   |  23  |  0.64    Ca    8.8      24 Oct 2018 05:01    TPro  6.1  /  Alb  2.0<L>  /  TBili  0.3  /  DBili  x   /  AST  20  /  ALT  18  /  AlkPhos  107  10-22    PT/INR - ( 23 Oct 2018 08:14 )   PT: 21.6 sec;   INR: 1.89 ratio         PTT - ( 23 Oct 2018 08:14 )  PTT:40.4 sec  Urinalysis Basic - ( 22 Oct 2018 17:16 )    Color: Yellow / Appearance: Clear / S.024 / pH: x  Gluc: x / Ketone: Negative  / Bili: Negative / Urobili: Negative   Blood: x / Protein: 30 mg/dL / Nitrite: Negative   Leuk Esterase: Negative / RBC: 4 /hpf / WBC 2 /hpf   Sq Epi: x / Non Sq Epi: 2 /hpf / Bacteria: Negative        RADIOLOGY & ADDITIONAL STUDIES:

## 2018-10-24 NOTE — PROGRESS NOTE ADULT - ASSESSMENT
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 requiring I&D, TKA explant, static spacer placement with complex wound closure by plastic surgery complicated by wound healing problems and development of pyoderma granulosum    wounds are managed by PRS/wound care  recommend endocrine, dermatology, rheumatology consults for management of pmr, pg and proper steroid dosing  abx per ID, lifelong minocycline for chronic suppression, apprecaite ID input  appreciate gen surg input, unlikely recurrence appendix, pending u/s abd fluid collection  continue to spend majority of day oob to wheelchair, okay to ambulate but must remain 50% weightbearing on RLE with NO KNEE MOTION allowed, knee immobilizer at all times if OOB  dvt ppx per primary, agree w xarelto plan  Nutrition consult recommended, optimize nutrition for continued wound healing  Minimize narcotics for this patient, she tends to become oversedated  Recommend dc to subacute rehab as opposed to home after this hospitalization, her  and patient lack the necessary support system to take care of her at home and she did not follow up with any of her doctors after her prolonged hospitalization    PLEASE OBTAIN XR R KNEE  	  I spoke with Mr. Edgarnilay to review plan over the phone. Further met with patient daughter Kylah for >15 minutes today at patient's bedside to review plan and next steps.

## 2018-10-24 NOTE — CHART NOTE - NSCHARTNOTEFT_GEN_A_CORE
I spoke with orthopedic surgery attg.    pt has completed coarse of coumadin for provoked dvt.  pt with difficult to control inr  will d/c coumadin and start xarelto for dvt px.    will get derm consult to see if pt needs to go back on steroids for pyoderma gangrenosum.    will need knee immobilizer    will order right knee xray.

## 2018-10-25 LAB
ANION GAP SERPL CALC-SCNC: 11 MMOL/L — SIGNIFICANT CHANGE UP (ref 5–17)
BUN SERPL-MCNC: 10 MG/DL — SIGNIFICANT CHANGE UP (ref 7–23)
CALCIUM SERPL-MCNC: 8.8 MG/DL — SIGNIFICANT CHANGE UP (ref 8.4–10.5)
CHLORIDE SERPL-SCNC: 105 MMOL/L — SIGNIFICANT CHANGE UP (ref 96–108)
CO2 SERPL-SCNC: 25 MMOL/L — SIGNIFICANT CHANGE UP (ref 22–31)
CREAT SERPL-MCNC: 0.6 MG/DL — SIGNIFICANT CHANGE UP (ref 0.5–1.3)
GLUCOSE SERPL-MCNC: 101 MG/DL — HIGH (ref 70–99)
HCT VFR BLD CALC: 26.7 % — LOW (ref 34.5–45)
HGB BLD-MCNC: 8.1 G/DL — LOW (ref 11.5–15.5)
INR BLD: 3.04 RATIO — HIGH (ref 0.88–1.16)
MCHC RBC-ENTMCNC: 26.8 PG — LOW (ref 27–34)
MCHC RBC-ENTMCNC: 30.3 GM/DL — LOW (ref 32–36)
MCV RBC AUTO: 88.4 FL — SIGNIFICANT CHANGE UP (ref 80–100)
PLATELET # BLD AUTO: 312 K/UL — SIGNIFICANT CHANGE UP (ref 150–400)
POTASSIUM SERPL-MCNC: 3.8 MMOL/L — SIGNIFICANT CHANGE UP (ref 3.5–5.3)
POTASSIUM SERPL-SCNC: 3.8 MMOL/L — SIGNIFICANT CHANGE UP (ref 3.5–5.3)
PROTHROM AB SERPL-ACNC: 35.1 SEC — HIGH (ref 10–13.1)
RBC # BLD: 3.02 M/UL — LOW (ref 3.8–5.2)
RBC # FLD: 21.7 % — HIGH (ref 10.3–14.5)
SODIUM SERPL-SCNC: 141 MMOL/L — SIGNIFICANT CHANGE UP (ref 135–145)
WBC # BLD: 8.63 K/UL — SIGNIFICANT CHANGE UP (ref 3.8–10.5)
WBC # FLD AUTO: 8.63 K/UL — SIGNIFICANT CHANGE UP (ref 3.8–10.5)

## 2018-10-25 PROCEDURE — 76705 ECHO EXAM OF ABDOMEN: CPT | Mod: 26

## 2018-10-25 PROCEDURE — 99232 SBSQ HOSP IP/OBS MODERATE 35: CPT

## 2018-10-25 RX ADMIN — TRAMADOL HYDROCHLORIDE 25 MILLIGRAM(S): 50 TABLET ORAL at 21:58

## 2018-10-25 RX ADMIN — TRAMADOL HYDROCHLORIDE 25 MILLIGRAM(S): 50 TABLET ORAL at 06:08

## 2018-10-25 RX ADMIN — Medication 81 MILLIGRAM(S): at 15:08

## 2018-10-25 RX ADMIN — Medication 1 MILLIGRAM(S): at 15:07

## 2018-10-25 RX ADMIN — Medication 100 MILLIGRAM(S): at 06:02

## 2018-10-25 RX ADMIN — PANTOPRAZOLE SODIUM 40 MILLIGRAM(S): 20 TABLET, DELAYED RELEASE ORAL at 06:02

## 2018-10-25 RX ADMIN — GABAPENTIN 300 MILLIGRAM(S): 400 CAPSULE ORAL at 21:47

## 2018-10-25 RX ADMIN — Medication 500 MILLIGRAM(S): at 15:08

## 2018-10-25 RX ADMIN — Medication 250 MILLIGRAM(S): at 15:09

## 2018-10-25 RX ADMIN — Medication 1 TABLET(S): at 15:08

## 2018-10-25 RX ADMIN — BUDESONIDE AND FORMOTEROL FUMARATE DIHYDRATE 2 PUFF(S): 160; 4.5 AEROSOL RESPIRATORY (INHALATION) at 17:59

## 2018-10-25 RX ADMIN — Medication 3 MILLIGRAM(S): at 21:47

## 2018-10-25 RX ADMIN — GABAPENTIN 300 MILLIGRAM(S): 400 CAPSULE ORAL at 06:02

## 2018-10-25 RX ADMIN — TRAMADOL HYDROCHLORIDE 25 MILLIGRAM(S): 50 TABLET ORAL at 06:40

## 2018-10-25 RX ADMIN — Medication 12.5 MILLIGRAM(S): at 06:02

## 2018-10-25 RX ADMIN — LORATADINE 10 MILLIGRAM(S): 10 TABLET ORAL at 15:07

## 2018-10-25 RX ADMIN — BUDESONIDE AND FORMOTEROL FUMARATE DIHYDRATE 2 PUFF(S): 160; 4.5 AEROSOL RESPIRATORY (INHALATION) at 06:03

## 2018-10-25 RX ADMIN — GABAPENTIN 300 MILLIGRAM(S): 400 CAPSULE ORAL at 15:07

## 2018-10-25 RX ADMIN — Medication 1 TABLET(S): at 06:02

## 2018-10-25 RX ADMIN — CEFEPIME 100 MILLIGRAM(S): 1 INJECTION, POWDER, FOR SOLUTION INTRAMUSCULAR; INTRAVENOUS at 12:22

## 2018-10-25 RX ADMIN — Medication 1 TABLET(S): at 21:47

## 2018-10-25 RX ADMIN — Medication 12.5 MILLIGRAM(S): at 18:00

## 2018-10-25 RX ADMIN — SIMVASTATIN 20 MILLIGRAM(S): 20 TABLET, FILM COATED ORAL at 21:47

## 2018-10-25 NOTE — PROGRESS NOTE ADULT - SUBJECTIVE AND OBJECTIVE BOX
CC: f/u for   fever  Patient reports  she feels ok  REVIEW OF SYSTEMS:  All other review of systems negative (Comprehensive ROS)    Antimicrobials Day #  :4  cefepime   IVPB 2000 milliGRAM(s) IV Intermittent every 12 hours  vancomycin  IVPB 1000 milliGRAM(s) IV Intermittent daily    Other Medications Reviewed    T(F): 99 (10-25-18 @ 12:48), Max: 99 (10-25-18 @ 12:48)  HR: 83 (10-25-18 @ 12:48)  BP: 145/82 (10-25-18 @ 12:48)  RR: 18 (10-25-18 @ 12:48)  SpO2: 95% (10-25-18 @ 12:48)  Wt(kg): --    PHYSICAL EXAM:  General: alert, no acute distress  Eyes:  anicteric, no conjunctival injection, no discharge  Oropharynx: no lesions or injection 	  Neck: supple, without adenopathy  Lungs: clear to auscultation  Heart: regular rate and rhythm; no murmur, rubs or gallops  Abdomen: soft, nondistended, nontender, without mass or organomegaly  Skin: no lesions  Extremities: no clubbing, cyanosis, . wounds on thighs and knee clean, no surrounding induration or redness or drainage  Neurologic: alert, oriented, moves all extremities  flank area superficial fluid under skin with no induration or redness  LAB RESULTS:                        8.1    8.63  )-----------( 312      ( 25 Oct 2018 09:15 )             26.7     10-25    141  |  105  |  10  ----------------------------<  101<H>  3.8   |  25  |  0.60    Ca    8.8      25 Oct 2018 07:09          MICROBIOLOGY:  RECENT CULTURES:  10-22 @ 19:22 .Urine Clean Catch (Midstream)     <10,000 CFU/ml Normal Urogenital roopa present      10-22 @ 19:01 .Blood Blood-Peripheral     No growth to date.      10-22 @ 18:10 .Blood Blood-Peripheral     No growth to date.          RADIOLOGY REVIEWED:    < from: US Abdomen Limited (10.25.18 @ 14:09) >  EXAM:  US ABDOMEN LIMITED                            PROCEDURE DATE:  10/25/2018            INTERPRETATION:  Clinical information: 77-year-old with right flank   collection noted on prior CT scan    Focused ultrasound examination of the abdomen reveals a complex fluid   collection in the subcutaneous tissues of the right flank, as noted on   prior CT scan. The collection measures 15 x 4.8 x 9.7 cm.     Small satellite collections are also noted including an anterior   collection measuring 7.5 x2.2 x 8.0 cm.     The collections contain internal echoes which may be related to hematoma   or infection.    Impression: Fluid collections are again identified in the right flank as   noted on prior CT scan.    < from: CT Abdomen and Pelvis w/ Oral Cont and w/ IV Cont (10.24.18 @ 10:56) >  XAM:  CT ABDOMEN AND PELVIS OC IC                          EXAM:  CT CHEST IC                            PROCEDURE DATE:  10/24/2018            INTERPRETATION:  CLINICAL INFORMATION: Fever of unknown origin.    COMPARISON: CTA chest, abdomen and pelvis dated 6/7/2018. CT abdomen   pelvis 11/7/2009.    PROCEDURE:   CT of the Chest, Abdomen and Pelvis was performed with intravenous   contrast.   Intravenous contrast: 90 ml Omnipaque 350. 10 ml discarded.  Oral contrast: positive contrast was administered.  Sagittal and coronal reformats were performed.    FINDINGS:    CHEST:     LUNGS AND LARGE AIRWAYS: Patent central airways. No focal consolidation.   Scattered bilateral calcified granulomas.  PLEURA: Small left and trace right pleural effusions.  VESSELS: Status post TAVR. Coronary calcifications with coronary stent.  HEART: Heart size is normal. No pericardial effusion.  MEDIASTINUM AND PEGGY: No lymphadenopathy.  CHEST WALL AND LOWER NECK: Left chest wall pacemaker.     ABDOMEN AND PELVIS:    LIVER: Hepatic steatosis. A 2.8 cm indeterminate left lateral hepatic   lobe lesion, unchanged from 11/7/2009. Additional subcentimeter   hypodensities, too small to characterize.   BILE DUCTS: Normal caliber.  GALLBLADDER: Cholecystectomy.  SPLEEN: Multiple low attenuating lesions, not significantly changed from   11/7/2009.  PANCREAS: Within normal limits.  ADRENALS: Within normal limits.  KIDNEYS/URETERS: A 1.8 cm enhancing lesion in the left lower pole not   significantly changed dating to 11/7/2009.    BLADDER: Within normal limits.  REPRODUCTIVE ORGANS: Hysterectomy. No adnexal mass.    BOWEL: No bowel obstruction. Status post gastroplasty. The appendix fills   with contrast and its proximal portion. There is persistent wall   thickening at the appendiceal tip, unchanged from prior study 6/7/2018.   Differential includes chronic inflammation versus a focal lesion.   PERITONEUM: No ascites.  VESSELS:  Infrarenal IVC filter. Atherosclerotic changes..  RETROPERITONEUM: No lymphadenopathy.    ABDOMINAL WALL: Ill-defined fluid collections within the subcutaneous   tissues of the right flank, partially visualized. Small lower ventral   abdominal hernia containing nonobstructed small bowel and fat.  BONES: Degenerative changes.    IMPRESSION:     Ill-defined fluid collections within the subcutaneous tissues of the   right flank, partially visualized. Consider correlation with ultrasound.   Hematoma is a differential consideration.    Persistent wall thickening of the appendiceal tail is unchanged from   prior study 6/7/2018. Differential includes chronic inflammation versus a   focal lesion.        < from: Transesophageal Echocardiogram (10.01.18 @ 16:17) >  ------------------------------------------------------------------------  Conclusions:  1. Mitral annular calcificationand calcified mitral  leaflets with normal diastolic opening. Mild mitral  regurgitation.  2. Transcatheter aortic valve replacement. The cusp  at the  right cusp appears thickened.  3. Moderate atheroma noted in aortic arch/descending aorta.  4. No left atrial or left atrial appendage thrombus.  5. Overall preserved left ventricular ejection fraction.  6. Poor windows. A device wire is noted in the right heart.  Limited visualization of the RA wire. No obvious vegetation   seen on the RV wire.  7. Normal right ventricular size and function.  8. Normal tricuspid valve. Mild-moderate tricuspid  regurgitation.  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 She had recent picc associated cns bacteremia but no further picc and no bacteremia now and tte no valve dysfunction of any significance so doubt endocarditis. Had appendicitis managed with antibiotics a few months ago. No pain and had evaluation by surgery, no active or likely cause of fever. Has some superficial subq fluid collections which are totally soft, no redness, no induration so infection is very unlikely. Could have had some relative adrenal insufficiency.   Plan:  will stop antibiotics and monitor off  will check am cortisol

## 2018-10-25 NOTE — PROGRESS NOTE ADULT - ASSESSMENT
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     fever unclear source  - for now continue vanco, cefepime, flagyl  - follow up culture  - US abdomen to look at collection  - surgery evaluation of appendix  - repeat echo to be sure no evident of endocarditis given recent bacteremia    Pyoderma gangrenosa.    will call Consult in dermatology to determine if pt should go back on prednisone.    Chronic diastolic heart failure.    -  Patient found to have moderate diastolic dysfunction on last TTE in 10/2018  - Will cont home aspirin and metoprolol  - Maintain euvolemic status.     CAD (coronary artery disease).   - cont  home dose of aspirin and metoprolol.     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   -  Pt had home dose of minocycline for suppressive therapy due to infection of TKR  - Will hold minocycline for now  - Per orthopedic team at last hospitalization- continue to spend majority of day oob to wheelchair, okay to ambulate but must remain 50% weightbearing on RLE with NO KNEE MOTION allowed, knee immobilizer at all times if OOB    History of DVT (deep vein thrombosis).    - pt's DVT was provoked and has been on coumadin for over 10 months.  will d/c coumadin and start xarelto for dvt px once inr is less than 2.    Morbid obesity.    - Start DASH diet  - Patient's mobility limited due to explant of total knee replacement    GERD (gastroesophageal reflux disease).    - cont home dose of pantoprazole.     VTE PPx  - therapeutic INR

## 2018-10-25 NOTE — CONSULT NOTE ADULT - SUBJECTIVE AND OBJECTIVE BOX
HPI:  This patient is a 77yoF with PMH of CAD s/p HERBERT to LAD, severe AS s/p TAVR, bradycardia s/p PPM, MVR, DVT /PE on couadin, total knee replacement c/b joint infections s/p I&D explantation, static spacer lacement and complex wound closure by plastic surgery with wound healing problems due to suspected pyoderma gangrenosum. Patient had been hospitalized from 9/25-10/5/2018 for septic shock, attributed to coag negative staph bacteremia. PICC pulled. ERIKA did not find vegetations. 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 PT required 1u PRBC for anemia. Patient had slow steroid taper for concern of adrenal insufficiency. Patient was discharged with visiting nurse services. Pt states that she developed poor appetite, new onset fever and fatigue at home and thus EMS was called. Home aide notes that patient had been complaining of severe pain from wounds at bilateral hips for last 2 days prior to admission. She denies cough, CP, N/V, diarrhea or constipation, abd pain, dysuria.     Patient and home health aide endorse that patient had no medication changes since discharge from the hospital.     In the ED, T 101, , /80, RR 28, SpO2 97% on supplemental O2  Pt given tylenol 975mg, vancomycin 1g, IVNS 1 L bolus and cefepime in ED (22 Oct 2018 20:23)    Paient s/p numerous skin biopsies. The last three biopsies have shown findings consistent with urticaria.  No sign of cutaneous infection on prior biopsies.  Patient was on prednisone 50 mg since July 2018 once infection was ruled out.   Last consult 10/2 given significant improvement of ulcers, dermatology recommended tapering off of prednisone 50mg. Patient completed taper last week. Ulcers have worsened since and there is a new lesion on left hip.  Dermatology was consulted to consider restarting prednisone.     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  Artificial cardiac pacemaker: 12/25/17  H/O cardiac catheterization: 11/29/16 HERBERT placed on LAD  H/O endoscopy: with dilatation of esophageal stricture 2013  S/P cataract surgery: x2; 3-4yr ago  S/P gastroplasty: 28 yrs ago  S/P cholecystectomy: more than 15 years ago  S/P total knee replacement: left, 15yr ago  S/P total knee replacement: right, 12yr ago  S/P hysterectomy: 24yr ago      REVIEW OF SYSTEMS      General: no fevers/chills, no lethargy	    Skin/Breast: see HPI  	  Ophthalmologic: no eye pain or change in vision  	  ENMT: no dysphagia or change in hearing    Respiratory and Thorax: no SOB or cough  	  Cardiovascular: no palpitations or chest pain    Gastrointestinal: no abdominal pain or blood in stool     Genitourinary: no dysuria or frequency    Musculoskeletal: no joint pains or weakness	    Neurological:no weakness, numbness , or tingling    MEDICATIONS  (STANDING):  ascorbic acid 500 milliGRAM(s) Oral daily  aspirin enteric coated 81 milliGRAM(s) Oral daily  buDESOnide 160 MICROgram(s)/formoterol 4.5 MICROgram(s) Inhaler 2 Puff(s) Inhalation two times a day  calcium carbonate 1250 mG  + Vitamin D (OsCal 500 + D) 1 Tablet(s) Oral three times a day  cefepime   IVPB 2000 milliGRAM(s) IV Intermittent every 12 hours  folic acid 1 milliGRAM(s) Oral daily  gabapentin 300 milliGRAM(s) Oral three times a day  loratadine 10 milliGRAM(s) Oral daily  melatonin 3 milliGRAM(s) Oral at bedtime  metoprolol tartrate 12.5 milliGRAM(s) Oral two times a day  metroNIDAZOLE  IVPB 500 milliGRAM(s) IV Intermittent every 8 hours  metroNIDAZOLE  IVPB      multivitamin 1 Tablet(s) Oral daily  pantoprazole    Tablet 40 milliGRAM(s) Oral before breakfast  simvastatin 20 milliGRAM(s) Oral at bedtime  vancomycin  IVPB 1000 milliGRAM(s) IV Intermittent daily    MEDICATIONS  (PRN):  acetaminophen   Tablet .. 650 milliGRAM(s) Oral every 4 hours PRN Temp greater or equal to 38C (100.4F), Mild Pain (1 - 3)  polyethylene glycol 3350 17 Gram(s) Oral daily PRN Constipation  traMADol 25 milliGRAM(s) Oral three times a day PRN Moderate Pain (4 - 6)      Allergies    amoxicillin (Other)    Intolerances    IV Contrast (Flushing (Skin); Nausea)      SOCIAL HISTORY:    FAMILY HISTORY:  No pertinent family history in first degree relatives      Vital Signs Last 24 Hrs  T(C): 37.2 (25 Oct 2018 12:48), Max: 37.2 (25 Oct 2018 12:48)  T(F): 99 (25 Oct 2018 12:48), Max: 99 (25 Oct 2018 12:48)  HR: 83 (25 Oct 2018 12:48) (83 - 91)  BP: 145/82 (25 Oct 2018 12:48) (128/75 - 145/82)  BP(mean): --  RR: 18 (25 Oct 2018 12:48) (18 - 18)  SpO2: 95% (25 Oct 2018 12:48) (95% - 97%)    PHYSICAL EXAM:     The patient was alert and oriented X 3, well nourished, and in no  apparent distress.  OP showed no ulcerations  There was no visible lymphadenopathy.  Conjunctiva were non injected  There was no clubbing or edema of extremities.  The scalp, hair, face, eyebrows, lips, OP, neck, chest, back,   extremities X 4, nails were examined.  There was no hyperhidrosis or bromhidrosis.    Of note on skin exam:     patient unable to tolerate exam 2/2 pain. Will refer to wound care note/nursing notes for description of ulcers and will attempt to visualize ulcers tomorrow.    LABS:                        8.1    8.63  )-----------( 312      ( 25 Oct 2018 09:15 )             26.7     10-25    141  |  105  |  10  ----------------------------<  101<H>  3.8   |  25  |  0.60    Ca    8.8      25 Oct 2018 07:09      PT/INR - ( 25 Oct 2018 09:17 )   PT: 35.1 sec;   INR: 3.04 ratio         PTT - ( 24 Oct 2018 07:54 )  PTT:45.2 sec      RADIOLOGY & ADDITIONAL STUDIES: HPI:  This patient is a 77yoF with PMH of CAD s/p HERBERT to LAD, severe AS s/p TAVR, bradycardia s/p PPM, MVR, DVT /PE on couadin, total knee replacement c/b joint infections s/p I&D explantation, static spacer lacement and complex wound closure by plastic surgery with wound healing problems due to suspected pyoderma gangrenosum. Patient had been hospitalized from 9/25-10/5/2018 for septic shock, attributed to coag negative staph bacteremia. PICC pulled. ERIKA did not find vegetations. 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 PT required 1u PRBC for anemia. Patient had slow steroid taper for concern of adrenal insufficiency. Patient was discharged with visiting nurse services. Pt states that she developed poor appetite, new onset fever and fatigue at home and thus EMS was called. Home aide notes that patient had been complaining of severe pain from wounds at bilateral hips for last 2 days prior to admission. She denies cough, CP, N/V, diarrhea or constipation, abd pain, dysuria.     Patient and home health aide endorse that patient had no medication changes since discharge from the hospital.     In the ED, T 101, , /80, RR 28, SpO2 97% on supplemental O2  Pt given tylenol 975mg, vancomycin 1g, IVNS 1 L bolus and cefepime in ED (22 Oct 2018 20:23)    Paient s/p numerous skin biopsies. The last three biopsies have shown findings consistent with urticaria.  No sign of cutaneous infection on prior biopsies.  Patient was on prednisone 50 mg since July 2018 once infection was ruled out.   Last consult 10/2 given significant improvement of ulcers, dermatology recommended tapering off of prednisone 50mg. Patient completed taper last week. Ulcers have worsened since and there is a new lesion on left hip.  Dermatology was consulted to consider restarting prednisone.     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  Artificial cardiac pacemaker: 12/25/17  H/O cardiac catheterization: 11/29/16 HERBERT placed on LAD  H/O endoscopy: with dilatation of esophageal stricture 2013  S/P cataract surgery: x2; 3-4yr ago  S/P gastroplasty: 28 yrs ago  S/P cholecystectomy: more than 15 years ago  S/P total knee replacement: left, 15yr ago  S/P total knee replacement: right, 12yr ago  S/P hysterectomy: 24yr ago      REVIEW OF SYSTEMS      General: no fevers/chills, no lethargy	    Skin/Breast: see HPI  	  Ophthalmologic: no eye pain or change in vision  	  ENMT: no dysphagia or change in hearing    Respiratory and Thorax: no SOB or cough  	  Cardiovascular: no palpitations or chest pain    Gastrointestinal: no abdominal pain or blood in stool     Genitourinary: no dysuria or frequency    Musculoskeletal: no joint pains or weakness	    Neurological:no weakness, numbness , or tingling    MEDICATIONS  (STANDING):  ascorbic acid 500 milliGRAM(s) Oral daily  aspirin enteric coated 81 milliGRAM(s) Oral daily  buDESOnide 160 MICROgram(s)/formoterol 4.5 MICROgram(s) Inhaler 2 Puff(s) Inhalation two times a day  calcium carbonate 1250 mG  + Vitamin D (OsCal 500 + D) 1 Tablet(s) Oral three times a day  cefepime   IVPB 2000 milliGRAM(s) IV Intermittent every 12 hours  folic acid 1 milliGRAM(s) Oral daily  gabapentin 300 milliGRAM(s) Oral three times a day  loratadine 10 milliGRAM(s) Oral daily  melatonin 3 milliGRAM(s) Oral at bedtime  metoprolol tartrate 12.5 milliGRAM(s) Oral two times a day  metroNIDAZOLE  IVPB 500 milliGRAM(s) IV Intermittent every 8 hours  metroNIDAZOLE  IVPB      multivitamin 1 Tablet(s) Oral daily  pantoprazole    Tablet 40 milliGRAM(s) Oral before breakfast  simvastatin 20 milliGRAM(s) Oral at bedtime  vancomycin  IVPB 1000 milliGRAM(s) IV Intermittent daily    MEDICATIONS  (PRN):  acetaminophen   Tablet .. 650 milliGRAM(s) Oral every 4 hours PRN Temp greater or equal to 38C (100.4F), Mild Pain (1 - 3)  polyethylene glycol 3350 17 Gram(s) Oral daily PRN Constipation  traMADol 25 milliGRAM(s) Oral three times a day PRN Moderate Pain (4 - 6)      Allergies    amoxicillin (Other)    Intolerances    IV Contrast (Flushing (Skin); Nausea)      SOCIAL HISTORY:    FAMILY HISTORY:  No pertinent family history in first degree relatives      Vital Signs Last 24 Hrs  T(C): 37.2 (25 Oct 2018 12:48), Max: 37.2 (25 Oct 2018 12:48)  T(F): 99 (25 Oct 2018 12:48), Max: 99 (25 Oct 2018 12:48)  HR: 83 (25 Oct 2018 12:48) (83 - 91)  BP: 145/82 (25 Oct 2018 12:48) (128/75 - 145/82)  BP(mean): --  RR: 18 (25 Oct 2018 12:48) (18 - 18)  SpO2: 95% (25 Oct 2018 12:48) (95% - 97%)    PHYSICAL EXAM:     The patient was alert and oriented X 3, well nourished, and in no  apparent distress.  OP showed no ulcerations  There was no visible lymphadenopathy.  Conjunctiva were non injected  There was no clubbing or edema of extremities.  The scalp, hair, face, eyebrows, lips, OP, neck, chest, back,   extremities X 4, nails were examined.  There was no hyperhidrosis or bromhidrosis.    Of note on skin exam:     worsening ulceration on mid lower abdomen, right lateral thigh, left lateral thigh, b/l inner thighs    LABS:                        8.1    8.63  )-----------( 312      ( 25 Oct 2018 09:15 )             26.7     10-25    141  |  105  |  10  ----------------------------<  101<H>  3.8   |  25  |  0.60    Ca    8.8      25 Oct 2018 07:09      PT/INR - ( 25 Oct 2018 09:17 )   PT: 35.1 sec;   INR: 3.04 ratio         PTT - ( 24 Oct 2018 07:54 )  PTT:45.2 sec      RADIOLOGY & ADDITIONAL STUDIES:

## 2018-10-25 NOTE — CONSULT NOTE ADULT - ASSESSMENT
78 yo F with PMH HTN, CAD with PPM readmitted for fevers and worsening ulcer.Currently the presumed diagnosis is pyoderma gangrenosum given significant improvement with prednisone, however multiple biopsies only revealed urticaria. Patient responded well from 07-10/2018 with prednisone 50mg and recently completed taper with new lesion on left hip.      Patient seen at bedside by resident.      Dermatology consult team will officially round and staff the patient tomorrow. Please call [phone] with any questions. 76 yo F with PMH HTN, CAD with PPM readmitted for fevers and worsening ulcer.Currently the presumed diagnosis is pyoderma gangrenosum given significant improvement with prednisone, however multiple biopsies only revealed urticaria. Patient responded well from 06-10/2018 with prednisone 50mg and recently completed taper with new lesion on left hip. Will restart patient on prednisone given significant improvement and will consider bridging patient to steroid sparing agent.     -restart prednisone 50 mg  -send acute hep panel and quantiferon gold  -plan for skin biopsy and tissue culture Monday to have histopathologic evidence of pyoderma gangrenosum    Patient staffed with . Derm will continue to follow patient.     Please page [phone] with questions.    Malou Dykes MD PGY-2  [facility] Dermatology  Pager: [phone]  Office: [phone] 76 yo F with PMH HTN, CAD with PPM readmitted for fevers and worsening ulcer. Currently the presumed diagnosis is pyoderma gangrenosum given significant improvement with prednisone and today's clinical presentation. Patient responded well from 06-10/2018 with prednisone 50mg and recently completed taper with new lesion on left hip. Will restart patient on prednisone given significant improvement with plan to bridge patient to steroid-sparing agent.    -restart prednisone at 50 mg daily  -send hepatitis panel and quantiferon gold in preparation for initiation of steroid-sparing immunosuppressants  - will approach pt for additional skin biopsy and tissue culture for clinicopathologic correlation. Please note these lesions are not c/w urticaria despite prior biopsy suggestive of this diagnosis     Patient staffed with . Derm will continue to follow patient.     Please page [phone] with questions.    Malou Dykes MD PGY-2  [facility] Dermatology  Pager: [phone]  Office: [phone]

## 2018-10-25 NOTE — PROGRESS NOTE ADULT - SUBJECTIVE AND OBJECTIVE BOX
doing well today  pending abd u/s    afvss  nad  abdominal wound - significantly healed, care per wound care  RLE  thigh - significant healing, dressing per wound care teams  knee wound now with dry dressing, epithelializing, almost completely covered no drainage  remainder of wounds dressed by wound care  5/5 ta/ehl/gcs  silt l4-s1  2+ dp pulse

## 2018-10-25 NOTE — PROGRESS NOTE ADULT - SUBJECTIVE AND OBJECTIVE BOX
Surgery Progress Note    S: Patient resting in bed. Denies any abdominal pain.  No events overnight.     T(C): 36.4 (10-25-18 @ 05:58), Max: 37.4 (10-24-18 @ 13:10)  HR: 83 (10-25-18 @ 05:58) (81 - 91)  BP: 134/73 (10-25-18 @ 05:58) (127/76 - 134/73)  RR: 18 (10-25-18 @ 05:58) (18 - 18)  SpO2: 95% (10-25-18 @ 05:58) (95% - 97%)  Wt(kg): --    10-24 @ 07:01  -  10-25 @ 07:00  --------------------------------------------------------  IN:    IV PiggyBack: 700 mL    Oral Fluid: 1320 mL  Total IN: 2020 mL    OUT:    Voided: 400 mL  Total OUT: 400 mL    Total NET: 1620 mL      PHYSICAL EXAM:  General: No acute distress  Respiratory: Nonlabored on NC  Cardiovascular: RRR  Abdominal: obese abdomen, Soft, NTND. Soft tissue wound superior to right inguinal region with CDI dressing.   Extremities: Warm                          8.1    8.62  )-----------( 377      ( 24 Oct 2018 09:14 )             27.0     10-24    139  |  105  |  11  ----------------------------<  111<H>  3.8   |  23  |  0.64    Ca    8.8      24 Oct 2018 05:01        PT/INR - ( 24 Oct 2018 07:54 )   PT: 27.9 sec;   INR: 2.42 ratio         PTT - ( 24 Oct 2018 07:54 )  PTT:45.2 sec

## 2018-10-25 NOTE — PROGRESS NOTE ADULT - ASSESSMENT
77F Hx CAD s/p stent to LAD, AS s/p TAVR, PPM, DVT (Dx 1/18) on coumadin, and recent admission for infected right knee hardware and bacteremia presented to the hospital with fevers and chronic lower extremity wounds. Surg consulted for CT finding of thickened appendiceal tail.    - patient not a good surgical candidate, not recommending surgery at this time.   - Patient can follow up as an outpatient for monitoring of possible appendiceal lesion.   - Continue with IV antibiotics.   - Patient pending ultrasound of right flank collection. Pending results, consider IR drainage of right flank collection as this may be a source of fever.   - please re-consult as needed    KIRTI Juárez PGY2  [id number]

## 2018-10-26 LAB
CORTIS AM PEAK SERPL-MCNC: 6 UG/DL — SIGNIFICANT CHANGE UP (ref 6–18.4)
HCT VFR BLD CALC: 25 % — LOW (ref 34.5–45)
HGB BLD-MCNC: 7.9 G/DL — LOW (ref 11.5–15.5)
INR BLD: 3.29 RATIO — HIGH (ref 0.88–1.16)
MCHC RBC-ENTMCNC: 26.6 PG — LOW (ref 27–34)
MCHC RBC-ENTMCNC: 31.7 GM/DL — LOW (ref 32–36)
MCV RBC AUTO: 83.8 FL — SIGNIFICANT CHANGE UP (ref 80–100)
PLATELET # BLD AUTO: 331 K/UL — SIGNIFICANT CHANGE UP (ref 150–400)
PROTHROM AB SERPL-ACNC: 36.7 SEC — HIGH (ref 9.8–12.7)
RBC # BLD: 2.98 M/UL — LOW (ref 3.8–5.2)
RBC # FLD: 19.5 % — HIGH (ref 10.3–14.5)
WBC # BLD: 10.4 K/UL — SIGNIFICANT CHANGE UP (ref 3.8–10.5)
WBC # FLD AUTO: 10.4 K/UL — SIGNIFICANT CHANGE UP (ref 3.8–10.5)

## 2018-10-26 PROCEDURE — 73560 X-RAY EXAM OF KNEE 1 OR 2: CPT | Mod: 26,RT

## 2018-10-26 PROCEDURE — 73562 X-RAY EXAM OF KNEE 3: CPT | Mod: 26,LT

## 2018-10-26 RX ORDER — MINOCYCLINE HYDROCHLORIDE 45 MG/1
100 TABLET, EXTENDED RELEASE ORAL EVERY 12 HOURS
Qty: 0 | Refills: 0 | Status: DISCONTINUED | OUTPATIENT
Start: 2018-10-26 | End: 2018-10-29

## 2018-10-26 RX ORDER — HYDROMORPHONE HYDROCHLORIDE 2 MG/ML
0.5 INJECTION INTRAMUSCULAR; INTRAVENOUS; SUBCUTANEOUS ONCE
Qty: 0 | Refills: 0 | Status: DISCONTINUED | OUTPATIENT
Start: 2018-10-26 | End: 2018-10-26

## 2018-10-26 RX ADMIN — Medication 81 MILLIGRAM(S): at 11:59

## 2018-10-26 RX ADMIN — GABAPENTIN 300 MILLIGRAM(S): 400 CAPSULE ORAL at 06:49

## 2018-10-26 RX ADMIN — Medication 650 MILLIGRAM(S): at 22:43

## 2018-10-26 RX ADMIN — BUDESONIDE AND FORMOTEROL FUMARATE DIHYDRATE 2 PUFF(S): 160; 4.5 AEROSOL RESPIRATORY (INHALATION) at 06:49

## 2018-10-26 RX ADMIN — Medication 1 MILLIGRAM(S): at 11:59

## 2018-10-26 RX ADMIN — GABAPENTIN 300 MILLIGRAM(S): 400 CAPSULE ORAL at 22:42

## 2018-10-26 RX ADMIN — HYDROMORPHONE HYDROCHLORIDE 0.5 MILLIGRAM(S): 2 INJECTION INTRAMUSCULAR; INTRAVENOUS; SUBCUTANEOUS at 15:30

## 2018-10-26 RX ADMIN — TRAMADOL HYDROCHLORIDE 25 MILLIGRAM(S): 50 TABLET ORAL at 18:31

## 2018-10-26 RX ADMIN — Medication 12.5 MILLIGRAM(S): at 06:49

## 2018-10-26 RX ADMIN — SIMVASTATIN 20 MILLIGRAM(S): 20 TABLET, FILM COATED ORAL at 22:42

## 2018-10-26 RX ADMIN — Medication 1 TABLET(S): at 11:59

## 2018-10-26 RX ADMIN — PANTOPRAZOLE SODIUM 40 MILLIGRAM(S): 20 TABLET, DELAYED RELEASE ORAL at 06:49

## 2018-10-26 RX ADMIN — TRAMADOL HYDROCHLORIDE 25 MILLIGRAM(S): 50 TABLET ORAL at 18:01

## 2018-10-26 RX ADMIN — Medication 12.5 MILLIGRAM(S): at 17:52

## 2018-10-26 RX ADMIN — BUDESONIDE AND FORMOTEROL FUMARATE DIHYDRATE 2 PUFF(S): 160; 4.5 AEROSOL RESPIRATORY (INHALATION) at 17:52

## 2018-10-26 RX ADMIN — LORATADINE 10 MILLIGRAM(S): 10 TABLET ORAL at 17:52

## 2018-10-26 RX ADMIN — Medication 1 TABLET(S): at 06:49

## 2018-10-26 RX ADMIN — Medication 1 TABLET(S): at 12:00

## 2018-10-26 RX ADMIN — Medication 3 MILLIGRAM(S): at 22:42

## 2018-10-26 RX ADMIN — TRAMADOL HYDROCHLORIDE 25 MILLIGRAM(S): 50 TABLET ORAL at 08:10

## 2018-10-26 RX ADMIN — Medication 1 TABLET(S): at 22:42

## 2018-10-26 RX ADMIN — HYDROMORPHONE HYDROCHLORIDE 0.5 MILLIGRAM(S): 2 INJECTION INTRAMUSCULAR; INTRAVENOUS; SUBCUTANEOUS at 15:00

## 2018-10-26 RX ADMIN — GABAPENTIN 300 MILLIGRAM(S): 400 CAPSULE ORAL at 12:00

## 2018-10-26 RX ADMIN — TRAMADOL HYDROCHLORIDE 25 MILLIGRAM(S): 50 TABLET ORAL at 07:50

## 2018-10-26 RX ADMIN — Medication 500 MILLIGRAM(S): at 11:59

## 2018-10-26 NOTE — PROGRESS NOTE ADULT - SUBJECTIVE AND OBJECTIVE BOX
Patient is a 77y old  Female who presents with a chief complaint of fever (26 Oct 2018 09:07)      SUBJECTIVE / OVERNIGHT EVENTS:  no new complaints.  seen by PT.  case d/w ID    MEDICATIONS  (STANDING):  ascorbic acid 500 milliGRAM(s) Oral daily  aspirin enteric coated 81 milliGRAM(s) Oral daily  buDESOnide 160 MICROgram(s)/formoterol 4.5 MICROgram(s) Inhaler 2 Puff(s) Inhalation two times a day  calcium carbonate 1250 mG  + Vitamin D (OsCal 500 + D) 1 Tablet(s) Oral three times a day  folic acid 1 milliGRAM(s) Oral daily  gabapentin 300 milliGRAM(s) Oral three times a day  loratadine 10 milliGRAM(s) Oral daily  melatonin 3 milliGRAM(s) Oral at bedtime  metoprolol tartrate 12.5 milliGRAM(s) Oral two times a day  multivitamin 1 Tablet(s) Oral daily  pantoprazole    Tablet 40 milliGRAM(s) Oral before breakfast  simvastatin 20 milliGRAM(s) Oral at bedtime    MEDICATIONS  (PRN):  acetaminophen   Tablet .. 650 milliGRAM(s) Oral every 4 hours PRN Temp greater or equal to 38C (100.4F), Mild Pain (1 - 3)  polyethylene glycol 3350 17 Gram(s) Oral daily PRN Constipation  traMADol 25 milliGRAM(s) Oral three times a day PRN Moderate Pain (4 - 6)      Vital Signs Last 24 Hrs  T(C): 37.2 (26 Oct 2018 08:30), Max: 37.6 (25 Oct 2018 21:18)  T(F): 99 (26 Oct 2018 08:30), Max: 99.7 (25 Oct 2018 21:18)  HR: 77 (26 Oct 2018 08:30) (77 - 87)  BP: 121/69 (26 Oct 2018 08:30) (121/69 - 145/82)  BP(mean): --  RR: 18 (26 Oct 2018 08:30) (18 - 18)  SpO2: 94% (26 Oct 2018 08:30) (94% - 99%)  CAPILLARY BLOOD GLUCOSE        I&O's Summary    25 Oct 2018 07:01  -  26 Oct 2018 07:00  --------------------------------------------------------  IN: 1100 mL / OUT: 300 mL / NET: 800 mL    26 Oct 2018 07:01  -  26 Oct 2018 11:33  --------------------------------------------------------  IN: 240 mL / OUT: 0 mL / NET: 240 mL        PHYSICAL EXAM:  GENERAL: NAD, well-developed, obese  HEAD:  Atraumatic, Normocephalic  EYES: EOMI, PERRLA, conjunctiva and sclera clear  NECK: Supple, No JVD  CHEST/LUNG: Clear to auscultation bilaterally; No wheeze  HEART: Regular rate and rhythm; No murmurs, rubs, or gallops  ABDOMEN: Soft, Nontender, Nondistended; Bowel sounds present  EXTREMITIES:  2+ Peripheral Pulses, No clubbing, cyanosis, or edema  PSYCH: AAOx3  NEUROLOGY: non-focal, weak  SKIN: No rashes or lesions    LABS:                        8.1    8.63  )-----------( 312      ( 25 Oct 2018 09:15 )             26.7     10-25    141  |  105  |  10  ----------------------------<  101<H>  3.8   |  25  |  0.60    Ca    8.8      25 Oct 2018 07:09      PT/INR - ( 25 Oct 2018 09:17 )   PT: 35.1 sec;   INR: 3.04 ratio                   RADIOLOGY & ADDITIONAL TESTS:    Imaging Personally Reviewed:  < from: US Abdomen Limited (10.25.18 @ 14:09) >  INTERPRETATION:  Clinical information: 77-year-old with right flank   collection noted on prior CT scan    Focused ultrasound examination of the abdomen reveals a complex fluid   collection in the subcutaneous tissues of the right flank, as noted on   prior CT scan. The collection measures 15 x 4.8 x 9.7 cm.     Small satellite collections are also noted including an anterior   collection measuring 7.5 x2.2 x 8.0 cm.     The collections contain internal echoes which may be related to hematoma   or infection.    Impression: Fluid collections are again identified in the right flank as   noted on prior CT scan.    < end of copied text >  < from: Transthoracic Echocardiogram (10.24.18 @ 18:24) >  Conclusions:  Patient refused to continue mid TTE. No gradients obtained.  1. Mitral annular calcification and calcified mitral  leaflets. Mitral valve not well visualized. No signifcant  mitral regurgitation.  NO gradients obtained  2. Transcatheter aortic valve replacement. Leaflets were  not well seen. No gradients obtained  No  intravalvular or  paravalvular regurgitation.  3. Mild left atrial enlargement.  4. Hyperdynamic left ventricular systolic function.  5. Normal right ventricular size and function.  6. Tricuspid valve not well visualized.  7. Pulmonic valve not well visualized.  8. Left pleural effusion.  9. Unable to exclude valvular vegetations in the setting of  a technically difficult study.  10. ERIKA could be obtained for further evaluation of  valvular vegetations, if clinical suspicion of endocarditis  is high.    < end of copied text >      Consultant(s) Notes Reviewed:      Care Discussed with Consultants/Other Providers:

## 2018-10-26 NOTE — PHYSICAL THERAPY INITIAL EVALUATION ADULT - GENERAL OBSERVATIONS, REHAB EVAL
Received in bed, +IVL, +dressing in LLE, +L knee brace locked in extension donned by PT, no complaints, agreeable to PT

## 2018-10-26 NOTE — PHYSICAL THERAPY INITIAL EVALUATION ADULT - BALANCE TRAINING, PT EVAL
GOAL: Pt will demonstrate improved static/dynamic balance to fair in order to improve stability, decrease fall risk, and increase independence in ADLs within 4 weeks.

## 2018-10-26 NOTE — PROGRESS NOTE ADULT - ASSESSMENT
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     fever unclear source  - STOP vanco, cefepime, flagyl  - follow up culture  - US abdomen to look at collection done  - surgery evaluation of appendix done  - repeat echo to be sure no evident of endocarditis given recent bacteremia done  - no further workup per ID    Pyoderma gangrenosa.    Consult in dermatology to determine if pt should go back on prednisone.    Chronic diastolic heart failure.    -  Patient found to have moderate diastolic dysfunction on last TTE in 10/2018  - Will cont home aspirin and metoprolol  - Maintain euvolemic status.     CAD (coronary artery disease).   - cont  home dose of aspirin and metoprolol.     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   -  Pt had home dose of minocycline for suppressive therapy due to infection of TKR  - cont minocycline   - Per orthopedic team at last hospitalization- continue to spend majority of day oob to wheelchair, okay to ambulate but must remain 50% weightbearing on RLE with NO KNEE MOTION allowed, knee immobilizer at all times if OOB    History of DVT (deep vein thrombosis).    - pt's DVT was provoked and has been on coumadin for over 10 months.  will d/c coumadin and start xarelto for dvt px once inr is less than 2.    Morbid obesity.    - Start DASH diet  - nutrition consult    GERD (gastroesophageal reflux disease).    - cont home dose of pantoprazole.     VTE PPx  - therapeutic INR

## 2018-10-26 NOTE — PHYSICAL THERAPY INITIAL EVALUATION ADULT - IMPAIRMENTS FOUND, PT EVAL
gait, locomotion, and balance/joint integrity and mobility/aerobic capacity/endurance/muscle strength

## 2018-10-26 NOTE — PROGRESS NOTE ADULT - ASSESSMENT
76 yo F w/ pmh HTN, CAD with PPM, and multiple chronic wounds and resolving pyoderma gangrenosum, presents after being found hyptensive to 60s. Pt w/ multiple wounds on lower extremities.     - continue dressing changes - aquacel on R knee and R thigh  - f/u wound care  - f/u ID  - f/u derm consult  - care per primary team

## 2018-10-26 NOTE — PROGRESS NOTE ADULT - SUBJECTIVE AND OBJECTIVE BOX
GENERAL SURGERY DAILY PROGRESS NOTE:     Subjective:  Pt seen and examined at bedside. no acute events overnight. R knee and thigh examined, both w/ aquacel dressing. Wounds clean based, healing well.     Objective:  NAD  R knee dressing and thigh c/d/i, w/ aquacel, granular tissue.     MEDICATIONS  (STANDING):  ascorbic acid 500 milliGRAM(s) Oral daily  aspirin enteric coated 81 milliGRAM(s) Oral daily  buDESOnide 160 MICROgram(s)/formoterol 4.5 MICROgram(s) Inhaler 2 Puff(s) Inhalation two times a day  calcium carbonate 1250 mG  + Vitamin D (OsCal 500 + D) 1 Tablet(s) Oral three times a day  folic acid 1 milliGRAM(s) Oral daily  gabapentin 300 milliGRAM(s) Oral three times a day  loratadine 10 milliGRAM(s) Oral daily  melatonin 3 milliGRAM(s) Oral at bedtime  metoprolol tartrate 12.5 milliGRAM(s) Oral two times a day  multivitamin 1 Tablet(s) Oral daily  pantoprazole    Tablet 40 milliGRAM(s) Oral before breakfast  simvastatin 20 milliGRAM(s) Oral at bedtime    MEDICATIONS  (PRN):  acetaminophen   Tablet .. 650 milliGRAM(s) Oral every 4 hours PRN Temp greater or equal to 38C (100.4F), Mild Pain (1 - 3)  polyethylene glycol 3350 17 Gram(s) Oral daily PRN Constipation  traMADol 25 milliGRAM(s) Oral three times a day PRN Moderate Pain (4 - 6)      Vital Signs Last 24 Hrs  T(C): 36.8 (26 Oct 2018 06:31), Max: 37.6 (25 Oct 2018 21:18)  T(F): 98.3 (26 Oct 2018 06:31), Max: 99.7 (25 Oct 2018 21:18)  HR: 80 (26 Oct 2018 06:31) (80 - 87)  BP: 130/77 (26 Oct 2018 06:31) (123/63 - 145/82)  BP(mean): --  RR: 18 (26 Oct 2018 06:31) (18 - 18)  SpO2: 99% (26 Oct 2018 06:31) (95% - 99%)    I&O's Detail    25 Oct 2018 07:01  -  26 Oct 2018 07:00  --------------------------------------------------------  IN:    IV PiggyBack: 300 mL    Oral Fluid: 800 mL  Total IN: 1100 mL    OUT:    Voided: 300 mL  Total OUT: 300 mL    Total NET: 800 mL          Daily     Daily     LABS:                        8.1    8.63  )-----------( 312      ( 25 Oct 2018 09:15 )             26.7     10-25    141  |  105  |  10  ----------------------------<  101<H>  3.8   |  25  |  0.60    Ca    8.8      25 Oct 2018 07:09      PT/INR - ( 25 Oct 2018 09:17 )   PT: 35.1 sec;   INR: 3.04 ratio               RADIOLOGY & ADDITIONAL STUDIES:

## 2018-10-26 NOTE — PHYSICAL THERAPY INITIAL EVALUATION ADULT - PERTINENT HX OF CURRENT PROBLEM, REHAB EVAL
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 Pt had been hospitalized from 9/25-10/5/2018 for septic shock, attributed to coag negative staph bacteremia. D/c'd home with home care. (CONT)

## 2018-10-26 NOTE — PHYSICAL THERAPY INITIAL EVALUATION ADULT - ADDITIONAL COMMENTS
Per pt, aide and friend at bedside, pt lives in an elevator access apartment building; no stairs to negotiate. Pt has 24/7 aide and owns a patient lift device, wheelchair, hospital bed, RW and shower chair    Per PT eval 3/2018, "pt has not been ambulatory since Dec due to knee surgeries, pt reports being bedbound since Feb due to pain and knee condition."

## 2018-10-26 NOTE — PROGRESS NOTE ADULT - SUBJECTIVE AND OBJECTIVE BOX
CC: f/u for fever    Patient reports feels ok    REVIEW OF SYSTEMS:  All other review of systems negative (Comprehensive ROS)    Antimicrobials Day #  :    Other Medications Reviewed    T(F): 98.4 (10-26-18 @ 17:30), Max: 99.7 (10-25-18 @ 21:18)  HR: 95 (10-26-18 @ 17:30)  BP: 136/81 (10-26-18 @ 17:30)  RR: 18 (10-26-18 @ 17:30)  SpO2: 97% (10-26-18 @ 17:30)  Wt(kg): --    PHYSICAL EXAM:  General: alert, no acute distress  Eyes:  anicteric, no conjunctival injection, no discharge  Oropharynx: no lesions or injection 	  Neck: supple, without adenopathy  Lungs: clear anterior to auscultation  Heart: regular rate and rhythm; no murmur, rubs or gallops  Abdomen: soft, nondistended, nontender, without mass or organomegaly. flanks are fluid filled on right but no tenderness or redness  Skin: wounds are clean  Extremities: thighs with  edema  Neurologic: alert, oriented, moves all extremities    LAB RESULTS:                        7.9    10.4  )-----------( 331      ( 26 Oct 2018 13:57 )             25.0     10-25    141  |  105  |  10  ----------------------------<  101<H>  3.8   |  25  |  0.60    Ca    8.8      25 Oct 2018 07:09          MICROBIOLOGY:  RECENT CULTURES:  10-22 @ 19:22 .Urine Clean Catch (Midstream)     <10,000 CFU/ml Normal Urogenital roopa present      10-22 @ 19:01 .Blood Blood-Peripheral     No growth to date.      10-22 @ 18:10 .Blood Blood-Peripheral     No growth to date.          RADIOLOGY REVIEWED:    < from: CT Abdomen and Pelvis w/ Oral Cont and w/ IV Cont (10.24.18 @ 10:56) >    IMPRESSION:     Ill-defined fluid collections within the subcutaneous tissues of the   right flank, partially visualized. Consider correlation with ultrasound.   Hematoma is a differential consideration.    Persistent wall thickening of the appendiceal tail is unchanged from   prior study 6/7/2018. Differential includes chronic inflammation versus a   focal lesion.        < end of copied text >            Assessment:  Patient on chronic minocycline for chronic right prosthetic knee spacer infection, pyoderma gangrenosa with clean wounds, no new ones s/p recent steroid taper, tavr, recent cns bacteremia from [facility] returns with fever but no infection has been found. Fluid collections of flank are likely just dependent. They do not feel infected at all and I am concerned that any skin puncture would give a new pyoderma lesion so would not favor drain or tap for now. chronic ap, no active infection, surgery input appreciated, no surgery for now  Plan: stopped iv antibiotics  restart suppressive minocycline  hope for rehab soon  local wound care

## 2018-10-26 NOTE — PHYSICAL THERAPY INITIAL EVALUATION ADULT - ACTIVE RANGE OF MOTION EXAMINATION, REHAB EVAL
Right LE Active ROM was WFL (within functional limits)/Left LE Active ROM was WFL (within functional limits)/bilateral upper extremity Active ROM was WFL (within functional limits)/deficits as listed below/except R knee not tested due to restrictions

## 2018-10-27 LAB
ANION GAP SERPL CALC-SCNC: 11 MMOL/L — SIGNIFICANT CHANGE UP (ref 5–17)
BUN SERPL-MCNC: 8 MG/DL — SIGNIFICANT CHANGE UP (ref 7–23)
CALCIUM SERPL-MCNC: 9.3 MG/DL — SIGNIFICANT CHANGE UP (ref 8.4–10.5)
CHLORIDE SERPL-SCNC: 103 MMOL/L — SIGNIFICANT CHANGE UP (ref 96–108)
CO2 SERPL-SCNC: 25 MMOL/L — SIGNIFICANT CHANGE UP (ref 22–31)
CREAT SERPL-MCNC: 0.54 MG/DL — SIGNIFICANT CHANGE UP (ref 0.5–1.3)
CULTURE RESULTS: SIGNIFICANT CHANGE UP
CULTURE RESULTS: SIGNIFICANT CHANGE UP
GLUCOSE SERPL-MCNC: 169 MG/DL — HIGH (ref 70–99)
HAV IGM SER-ACNC: SIGNIFICANT CHANGE UP
HBV CORE IGM SER-ACNC: SIGNIFICANT CHANGE UP
HBV SURFACE AG SER-ACNC: SIGNIFICANT CHANGE UP
HCV AB S/CO SERPL IA: 0.19 S/CO — SIGNIFICANT CHANGE UP
HCV AB SERPL-IMP: SIGNIFICANT CHANGE UP
INR BLD: 2.63 RATIO — HIGH (ref 0.88–1.16)
MAGNESIUM SERPL-MCNC: 1.5 MG/DL — LOW (ref 1.6–2.6)
PHOSPHATE SERPL-MCNC: 3.8 MG/DL — SIGNIFICANT CHANGE UP (ref 2.5–4.5)
POTASSIUM SERPL-MCNC: 3.9 MMOL/L — SIGNIFICANT CHANGE UP (ref 3.5–5.3)
POTASSIUM SERPL-SCNC: 3.9 MMOL/L — SIGNIFICANT CHANGE UP (ref 3.5–5.3)
PROTHROM AB SERPL-ACNC: 30.3 SEC — HIGH (ref 10–13.1)
SODIUM SERPL-SCNC: 139 MMOL/L — SIGNIFICANT CHANGE UP (ref 135–145)
SPECIMEN SOURCE: SIGNIFICANT CHANGE UP
SPECIMEN SOURCE: SIGNIFICANT CHANGE UP

## 2018-10-27 PROCEDURE — 99223 1ST HOSP IP/OBS HIGH 75: CPT

## 2018-10-27 RX ORDER — MAGNESIUM SULFATE 500 MG/ML
2 VIAL (ML) INJECTION ONCE
Qty: 0 | Refills: 0 | Status: DISCONTINUED | OUTPATIENT
Start: 2018-10-27 | End: 2018-10-27

## 2018-10-27 RX ORDER — MAGNESIUM SULFATE 500 MG/ML
2 VIAL (ML) INJECTION ONCE
Qty: 0 | Refills: 0 | Status: COMPLETED | OUTPATIENT
Start: 2018-10-27 | End: 2018-10-27

## 2018-10-27 RX ORDER — HYDROMORPHONE HYDROCHLORIDE 2 MG/ML
0.5 INJECTION INTRAMUSCULAR; INTRAVENOUS; SUBCUTANEOUS ONCE
Qty: 0 | Refills: 0 | Status: DISCONTINUED | OUTPATIENT
Start: 2018-10-27 | End: 2018-10-27

## 2018-10-27 RX ADMIN — TRAMADOL HYDROCHLORIDE 25 MILLIGRAM(S): 50 TABLET ORAL at 09:52

## 2018-10-27 RX ADMIN — Medication 12.5 MILLIGRAM(S): at 06:29

## 2018-10-27 RX ADMIN — Medication 3 MILLIGRAM(S): at 22:41

## 2018-10-27 RX ADMIN — TRAMADOL HYDROCHLORIDE 25 MILLIGRAM(S): 50 TABLET ORAL at 06:28

## 2018-10-27 RX ADMIN — Medication 1 MILLIGRAM(S): at 12:38

## 2018-10-27 RX ADMIN — Medication 1 TABLET(S): at 12:39

## 2018-10-27 RX ADMIN — BUDESONIDE AND FORMOTEROL FUMARATE DIHYDRATE 2 PUFF(S): 160; 4.5 AEROSOL RESPIRATORY (INHALATION) at 17:52

## 2018-10-27 RX ADMIN — TRAMADOL HYDROCHLORIDE 25 MILLIGRAM(S): 50 TABLET ORAL at 10:22

## 2018-10-27 RX ADMIN — Medication 500 MILLIGRAM(S): at 12:38

## 2018-10-27 RX ADMIN — LORATADINE 10 MILLIGRAM(S): 10 TABLET ORAL at 12:38

## 2018-10-27 RX ADMIN — MINOCYCLINE HYDROCHLORIDE 100 MILLIGRAM(S): 45 TABLET, EXTENDED RELEASE ORAL at 06:36

## 2018-10-27 RX ADMIN — Medication 1 TABLET(S): at 22:41

## 2018-10-27 RX ADMIN — HYDROMORPHONE HYDROCHLORIDE 0.5 MILLIGRAM(S): 2 INJECTION INTRAMUSCULAR; INTRAVENOUS; SUBCUTANEOUS at 15:26

## 2018-10-27 RX ADMIN — GABAPENTIN 300 MILLIGRAM(S): 400 CAPSULE ORAL at 06:29

## 2018-10-27 RX ADMIN — PANTOPRAZOLE SODIUM 40 MILLIGRAM(S): 20 TABLET, DELAYED RELEASE ORAL at 06:29

## 2018-10-27 RX ADMIN — Medication 650 MILLIGRAM(S): at 00:00

## 2018-10-27 RX ADMIN — Medication 50 MILLIGRAM(S): at 06:58

## 2018-10-27 RX ADMIN — GABAPENTIN 300 MILLIGRAM(S): 400 CAPSULE ORAL at 12:38

## 2018-10-27 RX ADMIN — Medication 1 TABLET(S): at 12:38

## 2018-10-27 RX ADMIN — TRAMADOL HYDROCHLORIDE 25 MILLIGRAM(S): 50 TABLET ORAL at 17:52

## 2018-10-27 RX ADMIN — MINOCYCLINE HYDROCHLORIDE 100 MILLIGRAM(S): 45 TABLET, EXTENDED RELEASE ORAL at 17:55

## 2018-10-27 RX ADMIN — TRAMADOL HYDROCHLORIDE 25 MILLIGRAM(S): 50 TABLET ORAL at 07:00

## 2018-10-27 RX ADMIN — POLYETHYLENE GLYCOL 3350 17 GRAM(S): 17 POWDER, FOR SOLUTION ORAL at 22:40

## 2018-10-27 RX ADMIN — Medication 81 MILLIGRAM(S): at 12:39

## 2018-10-27 RX ADMIN — SIMVASTATIN 20 MILLIGRAM(S): 20 TABLET, FILM COATED ORAL at 22:41

## 2018-10-27 RX ADMIN — Medication 12.5 MILLIGRAM(S): at 17:52

## 2018-10-27 RX ADMIN — GABAPENTIN 300 MILLIGRAM(S): 400 CAPSULE ORAL at 22:40

## 2018-10-27 RX ADMIN — Medication 1 TABLET(S): at 06:29

## 2018-10-27 RX ADMIN — HYDROMORPHONE HYDROCHLORIDE 0.5 MILLIGRAM(S): 2 INJECTION INTRAMUSCULAR; INTRAVENOUS; SUBCUTANEOUS at 15:56

## 2018-10-27 RX ADMIN — BUDESONIDE AND FORMOTEROL FUMARATE DIHYDRATE 2 PUFF(S): 160; 4.5 AEROSOL RESPIRATORY (INHALATION) at 06:36

## 2018-10-27 NOTE — BEHAVIORAL HEALTH ASSESSMENT NOTE - NSBHCHARTREVIEWVS_PSY_A_CORE FT
Vital Signs Last 24 Hrs  T(C): 36.7 (27 Oct 2018 15:48), Max: 37.7 (26 Oct 2018 20:02)  T(F): 98.1 (27 Oct 2018 15:48), Max: 99.8 (26 Oct 2018 20:02)  HR: 83 (27 Oct 2018 15:48) (76 - 95)  BP: 127/78 (27 Oct 2018 15:48) (112/82 - 149/73)  BP(mean): --  RR: 18 (27 Oct 2018 15:48) (18 - 18)  SpO2: 92% (27 Oct 2018 15:48) (92% - 99%)

## 2018-10-27 NOTE — BEHAVIORAL HEALTH ASSESSMENT NOTE - CASE SUMMARY
Pt. was resting in bed, she was about to get her dinner. She defered talikg to this DM in details, however she denied any current SIIIP/HIIP. She reported she wants to talk to someone only.  As per daughter, she reported pt is frustrated for ongoing medical condition, however she is sill future oriented and she does not seem to be in danger to hurt herself or to others. She asked to have therapy and deferred medications.

## 2018-10-27 NOTE — BEHAVIORAL HEALTH ASSESSMENT NOTE - NSBHCONSULTFOLLOWAFTERCARE_PSY_A_CORE FT
Pt may follow up at [facility] if patient feels she would like to speak with someone on an ongoing basis or initiate antidepressant medications.

## 2018-10-27 NOTE — BEHAVIORAL HEALTH ASSESSMENT NOTE - DESCRIPTION
PMH of significant cardiac hx, DVT/PE on couadin, total knee replacement c/b joint infections and suspected pyoderma gangrenosum, p/w SIRS, suspected 2/2 wound infection

## 2018-10-27 NOTE — BEHAVIORAL HEALTH ASSESSMENT NOTE - HPI (INCLUDE ILLNESS QUALITY, SEVERITY, DURATION, TIMING, CONTEXT, MODIFYING FACTORS, ASSOCIATED SIGNS AND SYMPTOMS)
78yo  female, , domiciled with  and HHA, with no significant PPHx, no substance abuse hx, no prior psychiatric admissions, no prior SA's, PMH of significant cardiac hx, DVT/PE on couadin, total knee replacement c/b joint infections and suspected pyoderma gangrenosum, p/w SIRS, suspected 2/2 wound infection. Last night patient was crying and saying "I want to die" although today patient says that she does not remember this. Patient's HHA at bedside says that at that time, patient had recent dressing change and pain medications and was disoriented at the time. Patient says she sometimes makes statements like "I want to die" when she is in pain but does not mean it, denies intent or plan.     Patient says that prior to the many painful medical problems that began late 2017, she had not been depressed and her mood was euthymic. Patient denies any psychiatric history, no prior psychiatric medications or diagnoses. At this time patient endorses good sleep, denies guilt, denies psychomotor agitation/retardation, denies suicidality. Patient endorses some loss of energy and poor concentration, but also states that this may be secondary to her medical condition and medications. Patient says that she is interested in talking to somebody on an ongoing basis for therapy and would be willing to initiate psychiatric medications if they were necessary, but is hesitant about medications due to her currently complicated medication regimen. 78yo  female, , domiciled with  and HHA, with no significant PPHx, no substance abuse hx, no prior psychiatric admissions, no prior SA's, PMH of significant cardiac hx, DVT/PE on coumadin, total knee replacement c/b joint infections and suspected pyoderma gangrenosum, p/w SIRS, suspected 2/2 wound infection. Last night patient was crying and saying "I want to die" although today patient says that she does not remember this. Patient's HHA at bedside says that at that time, patient had recent dressing change and pain medications and was disoriented at the time. Patient says she sometimes makes statements like "I want to die" when she is in pain but does not mean it, denies intent or plan.     Patient says that prior to the many painful medical problems that began late 2017, she had not been depressed and her mood was euthymic. Patient denies any psychiatric history, no prior psychiatric medications or diagnoses. At this time patient endorses good sleep, denies guilt, denies psychomotor agitation/retardation, denies suicidality. Patient endorses some loss of energy and poor concentration, but also states that this may be secondary to her medical condition and medications. Patient says that she is interested in talking to somebody on an ongoing basis for therapy and would be willing to initiate psychiatric medications if they were necessary, but is hesitant about medications due to her currently complicated medication regimen.

## 2018-10-27 NOTE — BEHAVIORAL HEALTH ASSESSMENT NOTE - RISK ASSESSMENT
Although patient is at baseline elevated risk of self harm due to ongoing pain and medical illnesses, protective factors include strong familial relationships, engagement with treatment, home health aides at all times, and future orientation. At this time patient denies suicidal ideation/intent/plan and does not require 1:1 observation.

## 2018-10-27 NOTE — PROGRESS NOTE ADULT - ASSESSMENT
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     depression  - pt is agreeable to psych eval  - may benefit from antidepressants ie Remeron    fever unclear source  - STOP vanco, cefepime, flagyl  - restart suppressive minocycline  - follow up culture  - US abdomen to look at collection done  - surgery evaluation of appendix done  - repeat echo to be sure no evident of endocarditis given recent bacteremia done  - no further workup per ID    Pyoderma gangrenosa.    - Consult in dermatology has decided  pt should go back on prednisone 50 mg daily.    Chronic diastolic heart failure.    -  Patient found to have moderate diastolic dysfunction on last TTE in 10/2018  - Will cont home aspirin and metoprolol  - Maintain euvolemic status.     CAD (coronary artery disease).   - cont  home dose of aspirin and metoprolol.     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   -  Pt had home dose of minocycline for suppressive therapy due to infection of TKR  - cont minocycline   - Per orthopedic team continue to spend majority of day oob to wheelchair, okay to ambulate but must remain 50% weightbearing on RLE with NO KNEE MOTION allowed, knee immobilizer at all times if OOB    History of DVT (deep vein thrombosis).    - pt's DVT was provoked and has been on coumadin for over 10 months.  will d/c coumadin and start xarelto for dvt px once inr is less than 2.    Morbid obesity.    -  DASH diet  - nutrition consult apreciated    GERD (gastroesophageal reflux disease).    - cont home dose of pantoprazole.     VTE PPx  - therapeutic INR

## 2018-10-27 NOTE — CHART NOTE - NSCHARTNOTEFT_GEN_A_CORE
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 requiring I&D, TKA explant, static spacer placement with complex wound closure by plastic surgery complicated by wound healing problems and development of pyoderma granulosum, now here with fevers/medical management    xr L tka: implants in proper position, no obvious e/o loosening or hw failure  xr R knee: static spacer intact with IMN in place, no e/o fracture, unchanged from prior radiographs    wounds are managed by PRS/wound care  recommend endocrine, dermatology, rheumatology consults for management of pmr, pg and proper steroid dosing  lifelong minocycline for chronic suppression, appreciate ID input  continue to spend majority of day oob to wheelchair, okay to ambulate but must remain 50% weightbearing on RLE with NO KNEE MOTION allowed, knee immobilizer at all times if OOB  xarelto for dvt ppx given lack of mobilization  Nutrition consult recommended, optimize nutrition for continued wound healing  Minimize narcotics for this patient, she tends to become oversedated  Recommend dc to subacute rehab as opposed to home after this hospitalization, her  and patient lack the necessary support system to take care of her at home and she did not follow up with any of her doctors after her prolonged hospitalization    Nate Bass M.D.  Attending Orthopedic Surgeon

## 2018-10-27 NOTE — BEHAVIORAL HEALTH ASSESSMENT NOTE - SUMMARY
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 Psych consulted bc last night patient was crying and saying "I want to die" although today patient says that she does not remember this. Patient says she sometimes makes statements like "I want to die" when she is in pain but does not mean it, denies intent or plan.     Patient at this time does not have symptoms meeting criteria for MDD. The depression that this patient has experienced began after her physical condition worsened in late 2017, and therefore her occasional sadness and agitation are likely secondary to these difficult medical problems with many medical hospitalizations, consistent with an adjustment disorder. At this time patient does not require initiation of antidepressants or other psychoactive medications. Would recommend an increase in Melatonin QHS dose, as well as consult with  (chaplaincy).

## 2018-10-27 NOTE — BEHAVIORAL HEALTH ASSESSMENT NOTE - NSBHCONSULTOBSREASON_PSY_A_CORE FT
Patient denies current suicidal ideation/intent/plan and does not endorse feelings of hopelessness or worthlessness.

## 2018-10-27 NOTE — PROGRESS NOTE ADULT - SUBJECTIVE AND OBJECTIVE BOX
Patient is a 77y old  Female who presents with a chief complaint of fever (26 Oct 2018 18:20)      SUBJECTIVE / OVERNIGHT EVENTS:  pt is tearful and crying,  " i want to die".      MEDICATIONS  (STANDING):  ascorbic acid 500 milliGRAM(s) Oral daily  aspirin enteric coated 81 milliGRAM(s) Oral daily  buDESOnide 160 MICROgram(s)/formoterol 4.5 MICROgram(s) Inhaler 2 Puff(s) Inhalation two times a day  calcium carbonate 1250 mG  + Vitamin D (OsCal 500 + D) 1 Tablet(s) Oral three times a day  folic acid 1 milliGRAM(s) Oral daily  gabapentin 300 milliGRAM(s) Oral three times a day  loratadine 10 milliGRAM(s) Oral daily  melatonin 3 milliGRAM(s) Oral at bedtime  metoprolol tartrate 12.5 milliGRAM(s) Oral two times a day  minocycline IVPB 100 milliGRAM(s) IV Intermittent every 12 hours  multivitamin 1 Tablet(s) Oral daily  pantoprazole    Tablet 40 milliGRAM(s) Oral before breakfast  predniSONE   Tablet 50 milliGRAM(s) Oral daily  simvastatin 20 milliGRAM(s) Oral at bedtime    MEDICATIONS  (PRN):  acetaminophen   Tablet .. 650 milliGRAM(s) Oral every 4 hours PRN Temp greater or equal to 38C (100.4F), Mild Pain (1 - 3)  polyethylene glycol 3350 17 Gram(s) Oral daily PRN Constipation  traMADol 25 milliGRAM(s) Oral three times a day PRN Moderate Pain (4 - 6)      Vital Signs Last 24 Hrs  T(C): 36.6 (27 Oct 2018 06:18), Max: 37.7 (26 Oct 2018 20:02)  T(F): 97.8 (27 Oct 2018 06:18), Max: 99.8 (26 Oct 2018 20:02)  HR: 76 (27 Oct 2018 06:18) (76 - 95)  BP: 131/76 (27 Oct 2018 06:18) (112/82 - 136/81)  BP(mean): --  RR: 18 (27 Oct 2018 06:18) (18 - 18)  SpO2: 95% (27 Oct 2018 06:18) (95% - 99%)  CAPILLARY BLOOD GLUCOSE        I&O's Summary    26 Oct 2018 07:01  -  27 Oct 2018 07:00  --------------------------------------------------------  IN: 960 mL / OUT: 0 mL / NET: 960 mL        PHYSICAL EXAM:  GENERAL: NAD, well-developed  HEAD:  Atraumatic, Normocephalic  EYES: EOMI, PERRLA, conjunctiva and sclera clear  NECK: Supple, No JVD  CHEST/LUNG: Clear to auscultation bilaterally; No wheeze  HEART: Regular rate and rhythm; No murmurs, rubs, or gallops  ABDOMEN: Soft, Nontender, Nondistended; Bowel sounds present  EXTREMITIES:  2+ Peripheral Pulses, No clubbing, cyanosis, or edema  PSYCH: AAOx3  NEUROLOGY: weaknessl  SKIN: No rashes or lesions    LABS:                        7.9    10.4  )-----------( 331      ( 26 Oct 2018 13:57 )             25.0           PT/INR - ( 26 Oct 2018 13:57 )   PT: 36.7 sec;   INR: 3.29 ratio                   RADIOLOGY & ADDITIONAL TESTS:    Imaging Personally Reviewed:    Consultant(s) Notes Reviewed:      Care Discussed with Consultants/Other Providers:

## 2018-10-27 NOTE — BEHAVIORAL HEALTH ASSESSMENT NOTE - NSBHSUICPROTECTFACT_PSY_A_CORE
Responsibility to family and others/Identifies reasons for living/Future oriented/Supportive social network or family/Ability to cope with stress

## 2018-10-27 NOTE — CHART NOTE - NSCHARTNOTEFT_GEN_A_CORE
Nutrition consult  Nutrition Follow-up  Chart reviewed, events noted.     77yoF with PMH of CAD s/p HERBERT to LAD, severe AS s/p TAVR, bradycardia s/p PPM, MVR, DVT /PE on coumadin, total knee replacement c/b joint infections s/p I&D explantation, static spacer placement and complex wound closure by plastic surgery with wound healing problems due to suspected pyoderma gangrenosum, p/w SIRS, suspected 2/2 wound infection. coumadin was discontinued.     Source: Patient [x]    Family [ ]     other [x] HHA, Comprehensive review of medical records     Diet : DASH/TLC       Pt's HHA reports pt isn't eating that well, pt had poor po intake yesterday and fair the day before. Pt drinks Howard x2/day and is willing to try health shakes. RD Encouraged balanced meals with adequate protein for healing. Discussed focusing on protein rich foods first and working around the plate. No N+V at this time, last BM was yesterday.        Enteral /Parenteral Nutrition: n/a     Current Weight: Weight (kg): 103 (10-22 @ 15:13)- noted dosing/admit wt of 227 pounds and wt on 10/24 of 253 pounds- noted large difference in weights- question accuracy of weights.   % Weight Change    Pertinent Medications: MEDICATIONS  (STANDING):  ascorbic acid 500 milliGRAM(s) Oral daily  aspirin enteric coated 81 milliGRAM(s) Oral daily  buDESOnide 160 MICROgram(s)/formoterol 4.5 MICROgram(s) Inhaler 2 Puff(s) Inhalation two times a day  calcium carbonate 1250 mG  + Vitamin D (OsCal 500 + D) 1 Tablet(s) Oral three times a day  folic acid 1 milliGRAM(s) Oral daily  gabapentin 300 milliGRAM(s) Oral three times a day  loratadine 10 milliGRAM(s) Oral daily  melatonin 3 milliGRAM(s) Oral at bedtime  metoprolol tartrate 12.5 milliGRAM(s) Oral two times a day  minocycline IVPB 100 milliGRAM(s) IV Intermittent every 12 hours  multivitamin 1 Tablet(s) Oral daily  pantoprazole    Tablet 40 milliGRAM(s) Oral before breakfast  predniSONE   Tablet 50 milliGRAM(s) Oral daily  simvastatin 20 milliGRAM(s) Oral at bedtime    MEDICATIONS  (PRN):  acetaminophen   Tablet .. 650 milliGRAM(s) Oral every 4 hours PRN Temp greater or equal to 38C (100.4F), Mild Pain (1 - 3)  polyethylene glycol 3350 17 Gram(s) Oral daily PRN Constipation  traMADol 25 milliGRAM(s) Oral three times a day PRN Moderate Pain (4 - 6)    Pertinent Labs:  10-25 Na141 mmol/L Glu 101 mg/dL<H> K+ 3.8 mmol/L Cr  0.60 mg/dL BUN 10 mg/dL 10-22 Alb 2.0 g/dL<L>      Skin: pt with multiple wounds, luann. buttocks and right knee unstageable pressure injuries.     Estimated Needs:   [ ] no change since previous assessment  [ ] recalculated:       Previous Nutrition Diagnosis:     [ ] Inadequate Energy Intake [ ]Inadequate Oral Intake [ ] Excessive Energy Intake     [ ] Underweight [ ] Increased Nutrient Needs [ ] Overweight/Obesity     [ ] Altered GI Function [ ] Unintended Weight Loss [ ] Food & Nutrition Related Knowledge Deficit [ ] Malnutrition          Nutrition Diagnosis is [ ] ongoing  [ ] resolved [ ] not applicable          New Nutrition Diagnosis: [ ] not applicable    [ ] Inadequate Protein Energy Intake [ ]Inadequate Oral Intake [ ] Excessive Energy Intake     [ ] Underweight [ ] Increased Nutrient Needs [ ] Overweight/Obesity     [ ] Altered GI Function [ ] Unintended Weight Loss [ ] Food & Nutrition Related Knowledge Deficit[ ] Limited Adherence to nutrition related recommendations [ ] Malnutrition  [ ] other: Free text       Related to:      As evidenced by:      Interventions:     Recommend    [ ] Change Diet To:    [ ] Nutrition Supplement    [ ] Nutrition Support    [ ] Other:        Monitoring and Evaluation:     [ ] PO intake [ ] Tolerance to diet prescription [ ] weights [ ] follow up per protocol    [ ] other: Nutrition consult  Nutrition Follow-up  Chart reviewed, events noted.     77yoF with PMH of CAD s/p HERBERT to LAD, severe AS s/p TAVR, bradycardia s/p PPM, MVR, DVT /PE on coumadin, total knee replacement c/b joint infections s/p I&D explantation, static spacer placement and complex wound closure by plastic surgery with wound healing problems due to suspected pyoderma gangrenosum, p/w SIRS, suspected 2/2 wound infection. coumadin was discontinued.     Source: Patient [x]    Family [ ]     other [x] HHA, Comprehensive review of medical records     Diet : DASH/TLC, Howard x2/day       Pt's HHA reports pt isn't eating that well, pt had poor po intake yesterday and fair the day before. Pt drinks Howard x2/day and is willing to try health shakes. RD Encouraged balanced meals with adequate protein for healing. Discussed focusing on protein rich foods first and working around the plate. No N+V at this time, last BM was yesterday.        Enteral /Parenteral Nutrition: n/a     Current Weight: Weight (kg): 103 (10-22 @ 15:13)- noted dosing/admit wt of 227 pounds and wt on 10/24 of 253 pounds- noted large difference in weights- question accuracy of weights.   % Weight Change    Pertinent Medications: MEDICATIONS  (STANDING):  ascorbic acid 500 milliGRAM(s) Oral daily  aspirin enteric coated 81 milliGRAM(s) Oral daily  buDESOnide 160 MICROgram(s)/formoterol 4.5 MICROgram(s) Inhaler 2 Puff(s) Inhalation two times a day  calcium carbonate 1250 mG  + Vitamin D (OsCal 500 + D) 1 Tablet(s) Oral three times a day  folic acid 1 milliGRAM(s) Oral daily  gabapentin 300 milliGRAM(s) Oral three times a day  loratadine 10 milliGRAM(s) Oral daily  melatonin 3 milliGRAM(s) Oral at bedtime  metoprolol tartrate 12.5 milliGRAM(s) Oral two times a day  minocycline IVPB 100 milliGRAM(s) IV Intermittent every 12 hours  multivitamin 1 Tablet(s) Oral daily  pantoprazole    Tablet 40 milliGRAM(s) Oral before breakfast  predniSONE   Tablet 50 milliGRAM(s) Oral daily  simvastatin 20 milliGRAM(s) Oral at bedtime    MEDICATIONS  (PRN):  acetaminophen   Tablet .. 650 milliGRAM(s) Oral every 4 hours PRN Temp greater or equal to 38C (100.4F), Mild Pain (1 - 3)  polyethylene glycol 3350 17 Gram(s) Oral daily PRN Constipation  traMADol 25 milliGRAM(s) Oral three times a day PRN Moderate Pain (4 - 6)    Pertinent Labs:  10-25 Na141 mmol/L Glu 101 mg/dL<H> K+ 3.8 mmol/L Cr  0.60 mg/dL BUN 10 mg/dL 10-22 Alb 2.0 g/dL<L>      Skin: pt with multiple wounds, luann. buttocks and right knee unstageable pressure injuries, 1+ generalized edema     Estimated Needs:   [x] no change since previous assessment  [ ] recalculated:       Previous Nutrition Diagnosis:     [ ] Inadequate Energy Intake [ ]Inadequate Oral Intake [ ] Excessive Energy Intake     [ ] Underweight [x] Increased Nutrient Needs [ ] Overweight/Obesity     [ ] Altered GI Function [ ] Unintended Weight Loss [ ] Food & Nutrition Related Knowledge Deficit [ ] Malnutrition          Nutrition Diagnosis is [x] ongoing- being addressed with Howard x2/day, MVI and vitamin C          New Nutrition Diagnosis: [x] not applicable       Interventions:     Recommend  1) Continue current diet with Howard x2/day, MVI, vitamin C, Ca+Vit D.   2) Encourage po intake with nutrient dense foods.   3) Provide food preferences as able.   4) Monitor weight, lab values, skin, po intake and GI tolerance.   5) Encourage balanced meals with adequate protein for healing.   6) Pt with decreased po intake, will provide health shakes.      Monitoring and Evaluation:   follow up per protocol  RD to remain available for further nutritional interventions as indicated.   Trena Santamaria, MS, RD, CDN #[id number]

## 2018-10-28 LAB
HCT VFR BLD CALC: 26.5 % — LOW (ref 34.5–45)
HGB BLD-MCNC: 8 G/DL — LOW (ref 11.5–15.5)
INR BLD: 2.66 RATIO — HIGH (ref 0.88–1.16)
MAGNESIUM SERPL-MCNC: 1.7 MG/DL — SIGNIFICANT CHANGE UP (ref 1.6–2.6)
MCHC RBC-ENTMCNC: 26.4 PG — LOW (ref 27–34)
MCHC RBC-ENTMCNC: 30.2 GM/DL — LOW (ref 32–36)
MCV RBC AUTO: 87.5 FL — SIGNIFICANT CHANGE UP (ref 80–100)
PLATELET # BLD AUTO: 378 K/UL — SIGNIFICANT CHANGE UP (ref 150–400)
PROTHROM AB SERPL-ACNC: 30.7 SEC — HIGH (ref 10–13.1)
RBC # BLD: 3.03 M/UL — LOW (ref 3.8–5.2)
RBC # FLD: 20.8 % — HIGH (ref 10.3–14.5)
WBC # BLD: 12.23 K/UL — HIGH (ref 3.8–10.5)
WBC # FLD AUTO: 12.23 K/UL — HIGH (ref 3.8–10.5)

## 2018-10-28 PROCEDURE — 99232 SBSQ HOSP IP/OBS MODERATE 35: CPT

## 2018-10-28 RX ORDER — TRAMADOL HYDROCHLORIDE 50 MG/1
25 TABLET ORAL THREE TIMES A DAY
Qty: 0 | Refills: 0 | Status: DISCONTINUED | OUTPATIENT
Start: 2018-10-28 | End: 2018-10-29

## 2018-10-28 RX ADMIN — LORATADINE 10 MILLIGRAM(S): 10 TABLET ORAL at 13:12

## 2018-10-28 RX ADMIN — TRAMADOL HYDROCHLORIDE 25 MILLIGRAM(S): 50 TABLET ORAL at 09:56

## 2018-10-28 RX ADMIN — Medication 81 MILLIGRAM(S): at 13:12

## 2018-10-28 RX ADMIN — TRAMADOL HYDROCHLORIDE 25 MILLIGRAM(S): 50 TABLET ORAL at 22:03

## 2018-10-28 RX ADMIN — Medication 1 TABLET(S): at 13:11

## 2018-10-28 RX ADMIN — GABAPENTIN 300 MILLIGRAM(S): 400 CAPSULE ORAL at 22:03

## 2018-10-28 RX ADMIN — GABAPENTIN 300 MILLIGRAM(S): 400 CAPSULE ORAL at 06:35

## 2018-10-28 RX ADMIN — Medication 50 MILLIGRAM(S): at 06:49

## 2018-10-28 RX ADMIN — SIMVASTATIN 20 MILLIGRAM(S): 20 TABLET, FILM COATED ORAL at 22:04

## 2018-10-28 RX ADMIN — Medication 1 TABLET(S): at 13:12

## 2018-10-28 RX ADMIN — Medication 3 MILLIGRAM(S): at 22:03

## 2018-10-28 RX ADMIN — MINOCYCLINE HYDROCHLORIDE 100 MILLIGRAM(S): 45 TABLET, EXTENDED RELEASE ORAL at 17:46

## 2018-10-28 RX ADMIN — TRAMADOL HYDROCHLORIDE 25 MILLIGRAM(S): 50 TABLET ORAL at 16:21

## 2018-10-28 RX ADMIN — Medication 12.5 MILLIGRAM(S): at 17:46

## 2018-10-28 RX ADMIN — Medication 1 TABLET(S): at 22:03

## 2018-10-28 RX ADMIN — TRAMADOL HYDROCHLORIDE 25 MILLIGRAM(S): 50 TABLET ORAL at 10:51

## 2018-10-28 RX ADMIN — BUDESONIDE AND FORMOTEROL FUMARATE DIHYDRATE 2 PUFF(S): 160; 4.5 AEROSOL RESPIRATORY (INHALATION) at 06:36

## 2018-10-28 RX ADMIN — GABAPENTIN 300 MILLIGRAM(S): 400 CAPSULE ORAL at 13:11

## 2018-10-28 RX ADMIN — TRAMADOL HYDROCHLORIDE 25 MILLIGRAM(S): 50 TABLET ORAL at 17:43

## 2018-10-28 RX ADMIN — MINOCYCLINE HYDROCHLORIDE 100 MILLIGRAM(S): 45 TABLET, EXTENDED RELEASE ORAL at 06:36

## 2018-10-28 RX ADMIN — Medication 500 MILLIGRAM(S): at 13:12

## 2018-10-28 RX ADMIN — Medication 1 MILLIGRAM(S): at 13:12

## 2018-10-28 RX ADMIN — Medication 12.5 MILLIGRAM(S): at 06:35

## 2018-10-28 RX ADMIN — Medication 1 TABLET(S): at 06:35

## 2018-10-28 RX ADMIN — BUDESONIDE AND FORMOTEROL FUMARATE DIHYDRATE 2 PUFF(S): 160; 4.5 AEROSOL RESPIRATORY (INHALATION) at 17:46

## 2018-10-28 RX ADMIN — PANTOPRAZOLE SODIUM 40 MILLIGRAM(S): 20 TABLET, DELAYED RELEASE ORAL at 06:35

## 2018-10-28 NOTE — PROGRESS NOTE ADULT - ASSESSMENT
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     depression  - pt was seen by  psych eval  - no antidepressant recommended at this time    fever unclear source  - STOP vanco, cefepime, flagyl  - restart suppressive minocycline  -  cultures NGTD  - US abdomen to look at collection done  - surgery evaluation of appendix done  - repeat echo to be sure no evident of endocarditis given recent bacteremia done  - no further workup per ID    Pyoderma gangrenosa.    - Consult in dermatology has decided  pt should go back on prednisone 50 mg daily.    Chronic diastolic heart failure.    -  Patient found to have moderate diastolic dysfunction on last TTE in 10/2018  - Will cont home aspirin and metoprolol  - Maintain euvolemic status.     CAD (coronary artery disease).   - cont  home dose of aspirin and metoprolol.     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   -  Pt had home dose of minocycline for suppressive therapy due to infection of TKR  - cont minocycline   - Per orthopedic team continue to spend majority of day oob to wheelchair, okay to ambulate but must remain 50% weightbearing on RLE with NO KNEE MOTION allowed, knee immobilizer at all times if OOB    History of DVT (deep vein thrombosis).    - pt's DVT was provoked and has been on coumadin for over 10 months.  will d/c coumadin and start xarelto for dvt px once inr is less than 2.    Morbid obesity.    -  DASH diet  - nutrition consult apreciated    GERD (gastroesophageal reflux disease).    - cont home dose of pantoprazole.     VTE PPx  - therapeutic INR    d/c planing to [facility]

## 2018-10-28 NOTE — PROGRESS NOTE ADULT - ASSESSMENT
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 requiring I&D, TKA explant, static spacer placement with complex wound closure by plastic surgery complicated by wound healing problems and development of pyoderma granulosum    wounds are managed by PRS/wound care  recommend endocrine, dermatology, rheumatology consults for management of pmr, pg and proper steroid dosing  abx per ID, lifelong minocycline for chronic suppression, apprecaite ID input  appreciate gen surg input, unlikely recurrence appendix, pending u/s abd fluid collection  continue to spend majority of day oob to wheelchair, okay to ambulate but must remain 50% weightbearing on RLE with NO KNEE MOTION allowed, knee immobilizer at all times if OOB  dvt ppx per primary, agree w xarelto plan  Nutrition consult recommended, optimize nutrition for continued wound healing  Minimize narcotics for this patient, she tends to become oversedated  Recommend dc to subacute rehab as opposed to home after this hospitalization, her  and patient lack the necessary support system to take care of her at home and she did not follow up with any of her doctors after her prolonged hospitalization

## 2018-10-28 NOTE — PROGRESS NOTE ADULT - SUBJECTIVE AND OBJECTIVE BOX
doing well today    afvss  nad  abdominal wound - significantly healed, care per wound care  RLE  thigh - significant healing, dressing per wound care teams  knee wound now with dry dressing, epithelializing, almost completely covered no drainage  remainder of wounds dressed by wound care  5/5 ta/ehl/gcs  silt l4-s1  2+ dp pulse     ros: depressed, addressed by behavioral health

## 2018-10-28 NOTE — PROGRESS NOTE ADULT - SUBJECTIVE AND OBJECTIVE BOX
Patient is a 77y old  Female who presents with a chief complaint of fever (27 Oct 2018 10:25)      SUBJECTIVE / OVERNIGHT EVENTS:  No chest pain. No shortness of breath. No complaints. No events overnight.     MEDICATIONS  (STANDING):  ascorbic acid 500 milliGRAM(s) Oral daily  aspirin enteric coated 81 milliGRAM(s) Oral daily  buDESOnide 160 MICROgram(s)/formoterol 4.5 MICROgram(s) Inhaler 2 Puff(s) Inhalation two times a day  calcium carbonate 1250 mG  + Vitamin D (OsCal 500 + D) 1 Tablet(s) Oral three times a day  folic acid 1 milliGRAM(s) Oral daily  gabapentin 300 milliGRAM(s) Oral three times a day  loratadine 10 milliGRAM(s) Oral daily  melatonin 3 milliGRAM(s) Oral at bedtime  metoprolol tartrate 12.5 milliGRAM(s) Oral two times a day  minocycline IVPB 100 milliGRAM(s) IV Intermittent every 12 hours  multivitamin 1 Tablet(s) Oral daily  pantoprazole    Tablet 40 milliGRAM(s) Oral before breakfast  predniSONE   Tablet 50 milliGRAM(s) Oral daily  simvastatin 20 milliGRAM(s) Oral at bedtime    MEDICATIONS  (PRN):  acetaminophen   Tablet .. 650 milliGRAM(s) Oral every 4 hours PRN Temp greater or equal to 38C (100.4F), Mild Pain (1 - 3)  polyethylene glycol 3350 17 Gram(s) Oral daily PRN Constipation  traMADol 25 milliGRAM(s) Oral three times a day PRN Moderate Pain (4 - 6)      Vital Signs Last 24 Hrs  T(C): 36.5 (28 Oct 2018 06:32), Max: 37.1 (27 Oct 2018 19:53)  T(F): 97.7 (28 Oct 2018 06:32), Max: 98.8 (27 Oct 2018 19:53)  HR: 70 (28 Oct 2018 06:32) (70 - 89)  BP: 134/75 (28 Oct 2018 06:32) (108/71 - 149/73)  BP(mean): --  RR: 18 (28 Oct 2018 06:32) (18 - 18)  SpO2: 95% (28 Oct 2018 06:32) (89% - 96%)  CAPILLARY BLOOD GLUCOSE        I&O's Summary    27 Oct 2018 07:01  -  28 Oct 2018 07:00  --------------------------------------------------------  IN: 520 mL / OUT: 0 mL / NET: 520 mL        PHYSICAL EXAM:  GENERAL: NAD, well-developed  HEAD:  Atraumatic, Normocephalic  EYES: EOMI, PERRLA, conjunctiva and sclera clear  NECK: Supple, No JVD  CHEST/LUNG: Clear to auscultation bilaterally; No wheeze  HEART: Regular rate and rhythm; No murmurs, rubs, or gallops  ABDOMEN: Soft, Nontender, Nondistended; Bowel sounds present  EXTREMITIES:  2+ Peripheral Pulses, No clubbing, cyanosis, or edema  PSYCH: AAOx3  NEUROLOGY: non-focal  SKIN: No rashes or lesions    LABS:                        7.9    10.4  )-----------( 331      ( 26 Oct 2018 13:57 )             25.0     10-27    139  |  103  |  8   ----------------------------<  169<H>  3.9   |  25  |  0.54    Ca    9.3      27 Oct 2018 10:48  Phos  3.8     10-27  Mg     1.7     10-28      PT/INR - ( 27 Oct 2018 11:41 )   PT: 30.3 sec;   INR: 2.63 ratio                   RADIOLOGY & ADDITIONAL TESTS:    Imaging Personally Reviewed:    Consultant(s) Notes Reviewed:      Care Discussed with Consultants/Other Providers:

## 2018-10-29 ENCOUNTER — TRANSCRIPTION ENCOUNTER (OUTPATIENT)
Age: 78
End: 2018-10-29

## 2018-10-29 VITALS — WEIGHT: 253.09 LBS

## 2018-10-29 DIAGNOSIS — F05 DELIRIUM DUE TO KNOWN PHYSIOLOGICAL CONDITION: ICD-10-CM

## 2018-10-29 LAB
ANION GAP SERPL CALC-SCNC: 10 MMOL/L — SIGNIFICANT CHANGE UP (ref 5–17)
BUN SERPL-MCNC: 21 MG/DL — SIGNIFICANT CHANGE UP (ref 7–23)
CALCIUM SERPL-MCNC: 8.9 MG/DL — SIGNIFICANT CHANGE UP (ref 8.4–10.5)
CHLORIDE SERPL-SCNC: 104 MMOL/L — SIGNIFICANT CHANGE UP (ref 96–108)
CO2 SERPL-SCNC: 25 MMOL/L — SIGNIFICANT CHANGE UP (ref 22–31)
CREAT SERPL-MCNC: 0.58 MG/DL — SIGNIFICANT CHANGE UP (ref 0.5–1.3)
GAMMA INTERFERON BACKGROUND BLD IA-ACNC: 0.04 IU/ML — SIGNIFICANT CHANGE UP
GLUCOSE SERPL-MCNC: 172 MG/DL — HIGH (ref 70–99)
HCT VFR BLD CALC: 25 % — LOW (ref 34.5–45)
HGB BLD-MCNC: 7.9 G/DL — LOW (ref 11.5–15.5)
INR BLD: 2.46 RATIO — HIGH (ref 0.88–1.16)
M TB IFN-G BLD-IMP: ABNORMAL
M TB IFN-G CD4+ BCKGRND COR BLD-ACNC: -0.01 IU/ML — SIGNIFICANT CHANGE UP
M TB IFN-G CD4+CD8+ BCKGRND COR BLD-ACNC: -0.01 IU/ML — SIGNIFICANT CHANGE UP
MCHC RBC-ENTMCNC: 26.8 PG — LOW (ref 27–34)
MCHC RBC-ENTMCNC: 31.7 GM/DL — LOW (ref 32–36)
MCV RBC AUTO: 84.8 FL — SIGNIFICANT CHANGE UP (ref 80–100)
PLATELET # BLD AUTO: 394 K/UL — SIGNIFICANT CHANGE UP (ref 150–400)
POTASSIUM SERPL-MCNC: 4.2 MMOL/L — SIGNIFICANT CHANGE UP (ref 3.5–5.3)
POTASSIUM SERPL-SCNC: 4.2 MMOL/L — SIGNIFICANT CHANGE UP (ref 3.5–5.3)
PROTHROM AB SERPL-ACNC: 27.1 SEC — HIGH (ref 9.8–12.7)
QUANT TB PLUS MITOGEN MINUS NIL: 0.23 IU/ML — SIGNIFICANT CHANGE UP
RBC # BLD: 2.95 M/UL — LOW (ref 3.8–5.2)
RBC # FLD: 19.5 % — HIGH (ref 10.3–14.5)
SODIUM SERPL-SCNC: 139 MMOL/L — SIGNIFICANT CHANGE UP (ref 135–145)
WBC # BLD: 12.2 K/UL — HIGH (ref 3.8–10.5)
WBC # FLD AUTO: 12.2 K/UL — HIGH (ref 3.8–10.5)

## 2018-10-29 PROCEDURE — 99223 1ST HOSP IP/OBS HIGH 75: CPT | Mod: GC

## 2018-10-29 PROCEDURE — 99232 SBSQ HOSP IP/OBS MODERATE 35: CPT

## 2018-10-29 RX ORDER — ACETAMINOPHEN 500 MG
2 TABLET ORAL
Qty: 0 | Refills: 0 | COMMUNITY
Start: 2018-10-29

## 2018-10-29 RX ORDER — LANOLIN ALCOHOL/MO/W.PET/CERES
1 CREAM (GRAM) TOPICAL
Qty: 0 | Refills: 0 | COMMUNITY
Start: 2018-10-29

## 2018-10-29 RX ORDER — GABAPENTIN 400 MG/1
1 CAPSULE ORAL
Qty: 0 | Refills: 0 | COMMUNITY
Start: 2018-10-29

## 2018-10-29 RX ORDER — ASPIRIN/CALCIUM CARB/MAGNESIUM 324 MG
1 TABLET ORAL
Qty: 0 | Refills: 0 | COMMUNITY
Start: 2018-10-29

## 2018-10-29 RX ORDER — SIMVASTATIN 20 MG/1
1 TABLET, FILM COATED ORAL
Qty: 0 | Refills: 0 | COMMUNITY
Start: 2018-10-29

## 2018-10-29 RX ORDER — TRAMADOL HYDROCHLORIDE 50 MG/1
0.5 TABLET ORAL
Qty: 0 | Refills: 0 | COMMUNITY
Start: 2018-10-29

## 2018-10-29 RX ORDER — ASCORBIC ACID 60 MG
1 TABLET,CHEWABLE ORAL
Qty: 0 | Refills: 0 | COMMUNITY
Start: 2018-10-29

## 2018-10-29 RX ORDER — FOLIC ACID 0.8 MG
1 TABLET ORAL
Qty: 0 | Refills: 0 | COMMUNITY
Start: 2018-10-29

## 2018-10-29 RX ORDER — POLYETHYLENE GLYCOL 3350 17 G/17G
17 POWDER, FOR SOLUTION ORAL
Qty: 0 | Refills: 0 | COMMUNITY
Start: 2018-10-29

## 2018-10-29 RX ORDER — LORATADINE 10 MG/1
1 TABLET ORAL
Qty: 0 | Refills: 0 | COMMUNITY
Start: 2018-10-29

## 2018-10-29 RX ORDER — PANTOPRAZOLE SODIUM 20 MG/1
1 TABLET, DELAYED RELEASE ORAL
Qty: 0 | Refills: 0 | COMMUNITY
Start: 2018-10-29

## 2018-10-29 RX ADMIN — BUDESONIDE AND FORMOTEROL FUMARATE DIHYDRATE 2 PUFF(S): 160; 4.5 AEROSOL RESPIRATORY (INHALATION) at 05:05

## 2018-10-29 RX ADMIN — Medication 500 MILLIGRAM(S): at 13:37

## 2018-10-29 RX ADMIN — BUDESONIDE AND FORMOTEROL FUMARATE DIHYDRATE 2 PUFF(S): 160; 4.5 AEROSOL RESPIRATORY (INHALATION) at 18:07

## 2018-10-29 RX ADMIN — Medication 650 MILLIGRAM(S): at 04:53

## 2018-10-29 RX ADMIN — Medication 12.5 MILLIGRAM(S): at 05:04

## 2018-10-29 RX ADMIN — GABAPENTIN 300 MILLIGRAM(S): 400 CAPSULE ORAL at 05:04

## 2018-10-29 RX ADMIN — PANTOPRAZOLE SODIUM 40 MILLIGRAM(S): 20 TABLET, DELAYED RELEASE ORAL at 05:05

## 2018-10-29 RX ADMIN — Medication 100 MILLIGRAM(S): at 17:48

## 2018-10-29 RX ADMIN — Medication 50 MILLIGRAM(S): at 05:05

## 2018-10-29 RX ADMIN — TRAMADOL HYDROCHLORIDE 25 MILLIGRAM(S): 50 TABLET ORAL at 10:21

## 2018-10-29 RX ADMIN — TRAMADOL HYDROCHLORIDE 25 MILLIGRAM(S): 50 TABLET ORAL at 16:55

## 2018-10-29 RX ADMIN — MINOCYCLINE HYDROCHLORIDE 100 MILLIGRAM(S): 45 TABLET, EXTENDED RELEASE ORAL at 05:05

## 2018-10-29 RX ADMIN — Medication 1 MILLIGRAM(S): at 13:38

## 2018-10-29 RX ADMIN — Medication 81 MILLIGRAM(S): at 13:38

## 2018-10-29 RX ADMIN — TRAMADOL HYDROCHLORIDE 25 MILLIGRAM(S): 50 TABLET ORAL at 18:08

## 2018-10-29 RX ADMIN — TRAMADOL HYDROCHLORIDE 25 MILLIGRAM(S): 50 TABLET ORAL at 13:43

## 2018-10-29 RX ADMIN — Medication 1 TABLET(S): at 13:38

## 2018-10-29 RX ADMIN — LORATADINE 10 MILLIGRAM(S): 10 TABLET ORAL at 13:38

## 2018-10-29 RX ADMIN — Medication 1 TABLET(S): at 05:05

## 2018-10-29 RX ADMIN — GABAPENTIN 300 MILLIGRAM(S): 400 CAPSULE ORAL at 13:38

## 2018-10-29 NOTE — DISCHARGE NOTE ADULT - ADDITIONAL INSTRUCTIONS
Follow up with Wound Care, Dermatology, and your primary healthcare provider(s) as instructed above.  Dermatology at [address] [place] [address], [address], [place], [address]. Patient should call [phone] to schedule appointment with any provider in 1-2 months.

## 2018-10-29 NOTE — PROGRESS NOTE ADULT - SUBJECTIVE AND OBJECTIVE BOX
INTERVAL HPI/OVERNIGHT EVENTS:    MEDICATIONS  (STANDING):  ascorbic acid 500 milliGRAM(s) Oral daily  aspirin enteric coated 81 milliGRAM(s) Oral daily  buDESOnide 160 MICROgram(s)/formoterol 4.5 MICROgram(s) Inhaler 2 Puff(s) Inhalation two times a day  calcium carbonate 1250 mG  + Vitamin D (OsCal 500 + D) 1 Tablet(s) Oral three times a day  folic acid 1 milliGRAM(s) Oral daily  gabapentin 300 milliGRAM(s) Oral three times a day  loratadine 10 milliGRAM(s) Oral daily  melatonin 3 milliGRAM(s) Oral at bedtime  metoprolol tartrate 12.5 milliGRAM(s) Oral two times a day  minocycline IVPB 100 milliGRAM(s) IV Intermittent every 12 hours  multivitamin 1 Tablet(s) Oral daily  pantoprazole    Tablet 40 milliGRAM(s) Oral before breakfast  predniSONE   Tablet 50 milliGRAM(s) Oral daily  simvastatin 20 milliGRAM(s) Oral at bedtime    MEDICATIONS  (PRN):  acetaminophen   Tablet .. 650 milliGRAM(s) Oral every 4 hours PRN Temp greater or equal to 38C (100.4F), Mild Pain (1 - 3)  polyethylene glycol 3350 17 Gram(s) Oral daily PRN Constipation  traMADol 25 milliGRAM(s) Oral three times a day PRN Moderate Pain (4 - 6)      Allergies    amoxicillin (Other)    Intolerances    IV Contrast (Flushing (Skin); Nausea)      REVIEW OF SYSTEMS      General: no fevers/chills, no NS	    Skin: see HPI  	  Ophthalmologic: no eye pain or change in vision    Genitourinary: no dysuria or hematuria    Musculoskeletal: no joint pains or weakness	    Neurological:no weakness or tingling          Vital Signs Last 24 Hrs  T(C): 36.4 (29 Oct 2018 04:55), Max: 36.6 (28 Oct 2018 13:32)  T(F): 97.5 (29 Oct 2018 04:55), Max: 97.9 (28 Oct 2018 13:32)  HR: 69 (29 Oct 2018 04:55) (69 - 94)  BP: 146/81 (29 Oct 2018 04:55) (113/68 - 148/66)  BP(mean): --  RR: 18 (29 Oct 2018 04:55) (18 - 18)  SpO2: 95% (29 Oct 2018 04:55) (92% - 95%)    PHYSICAL EXAM:   The patient was alert and oriented X 3, well nourished, and in no  apparent distress.  There was no visible lymphadenopathy.  Conjunctiva were non injected  There was no clubbing or edema of extremities.    Of note on skin exam:       LABS:                        8.0    12.23 )-----------( 378      ( 28 Oct 2018 12:19 )             26.5     10-27    139  |  103  |  8   ----------------------------<  169<H>  3.9   |  25  |  0.54    Ca    9.3      27 Oct 2018 10:48  Phos  3.8     10-27  Mg     1.7     10-28      PT/INR - ( 28 Oct 2018 12:19 )   PT: 30.7 sec;   INR: 2.66 ratio               RADIOLOGY & ADDITIONAL TESTS: INTERVAL HPI/OVERNIGHT EVENTS:    MEDICATIONS  (STANDING):  ascorbic acid 500 milliGRAM(s) Oral daily  aspirin enteric coated 81 milliGRAM(s) Oral daily  buDESOnide 160 MICROgram(s)/formoterol 4.5 MICROgram(s) Inhaler 2 Puff(s) Inhalation two times a day  calcium carbonate 1250 mG  + Vitamin D (OsCal 500 + D) 1 Tablet(s) Oral three times a day  folic acid 1 milliGRAM(s) Oral daily  gabapentin 300 milliGRAM(s) Oral three times a day  loratadine 10 milliGRAM(s) Oral daily  melatonin 3 milliGRAM(s) Oral at bedtime  metoprolol tartrate 12.5 milliGRAM(s) Oral two times a day  minocycline IVPB 100 milliGRAM(s) IV Intermittent every 12 hours  multivitamin 1 Tablet(s) Oral daily  pantoprazole    Tablet 40 milliGRAM(s) Oral before breakfast  predniSONE   Tablet 50 milliGRAM(s) Oral daily  simvastatin 20 milliGRAM(s) Oral at bedtime    MEDICATIONS  (PRN):  acetaminophen   Tablet .. 650 milliGRAM(s) Oral every 4 hours PRN Temp greater or equal to 38C (100.4F), Mild Pain (1 - 3)  polyethylene glycol 3350 17 Gram(s) Oral daily PRN Constipation  traMADol 25 milliGRAM(s) Oral three times a day PRN Moderate Pain (4 - 6)      Allergies    amoxicillin (Other)    Intolerances    IV Contrast (Flushing (Skin); Nausea)      REVIEW OF SYSTEMS      General: no fevers/chills, no NS	    Skin: see HPI  	  Ophthalmologic: no eye pain or change in vision    Genitourinary: no dysuria or hematuria    Musculoskeletal: no joint pains or weakness	    Neurological:no weakness or tingling          Vital Signs Last 24 Hrs  T(C): 36.4 (29 Oct 2018 04:55), Max: 36.6 (28 Oct 2018 13:32)  T(F): 97.5 (29 Oct 2018 04:55), Max: 97.9 (28 Oct 2018 13:32)  HR: 69 (29 Oct 2018 04:55) (69 - 94)  BP: 146/81 (29 Oct 2018 04:55) (113/68 - 148/66)  BP(mean): --  RR: 18 (29 Oct 2018 04:55) (18 - 18)  SpO2: 95% (29 Oct 2018 04:55) (92% - 95%)    PHYSICAL EXAM:   The patient was alert and oriented X 3, well nourished, and in no  apparent distress.  There was no visible lymphadenopathy.  Conjunctiva were non injected  There was no clubbing or edema of extremities.    Of note on skin exam:       LABS:                        8.0    12.23 )-----------( 378      ( 28 Oct 2018 12:19 )             26.5     10-27    139  |  103  |  8   ----------------------------<  169<H>  3.9   |  25  |  0.54    Ca    9.3      27 Oct 2018 10:48  Phos  3.8     10-27  Mg     1.7     10-28      PT/INR - ( 28 Oct 2018 12:19 )   PT: 30.7 sec;   INR: 2.66 ratio         Acute Hepatitis Panel (10.27.18 @ 11:41)    Hepatitis C Virus Interpretation: Nonreact: Hepatitis C AB  S/CO Ratio                        Interpretation  < 1.0                                     Non-Reactive  1.0 - 4.9                           Weakly-Reactive  > 5.0                                 Reactive  Non-Reactive: A person witha non-reactive HCV antibody result is  considered uninfected.  No further action is needed unless recent  infection is suspected.  In these cases, consider repeat testing later to  detect seroconversion..  Weakly-Reactive: HCV antibody test is abnormal, HCV RNA Qualitative test  will follow.  Reactive: HCV antibody test is abnormal, HCV RNA Qualitative test will  follow.  Note: HCV antibody testing is performed on the Abbott  system.    Hepatitis C Virus S/CO Ratio: 0.19 S/CO    Hepatitis B Core IgM Antibody: Nonreact    Hepatitis B Surface Antigen: Nonreact    Hepatitis A IgM Antibody: Nonreact          RADIOLOGY & ADDITIONAL TESTS: INTERVAL HPI/OVERNIGHT EVENTS:  Patient afebrile  Primary team plans for discharge to [facility] rehab.    MEDICATIONS  (STANDING):  ascorbic acid 500 milliGRAM(s) Oral daily  aspirin enteric coated 81 milliGRAM(s) Oral daily  buDESOnide 160 MICROgram(s)/formoterol 4.5 MICROgram(s) Inhaler 2 Puff(s) Inhalation two times a day  calcium carbonate 1250 mG  + Vitamin D (OsCal 500 + D) 1 Tablet(s) Oral three times a day  folic acid 1 milliGRAM(s) Oral daily  gabapentin 300 milliGRAM(s) Oral three times a day  loratadine 10 milliGRAM(s) Oral daily  melatonin 3 milliGRAM(s) Oral at bedtime  metoprolol tartrate 12.5 milliGRAM(s) Oral two times a day  minocycline IVPB 100 milliGRAM(s) IV Intermittent every 12 hours  multivitamin 1 Tablet(s) Oral daily  pantoprazole    Tablet 40 milliGRAM(s) Oral before breakfast  predniSONE   Tablet 50 milliGRAM(s) Oral daily  simvastatin 20 milliGRAM(s) Oral at bedtime    MEDICATIONS  (PRN):  acetaminophen   Tablet .. 650 milliGRAM(s) Oral every 4 hours PRN Temp greater or equal to 38C (100.4F), Mild Pain (1 - 3)  polyethylene glycol 3350 17 Gram(s) Oral daily PRN Constipation  traMADol 25 milliGRAM(s) Oral three times a day PRN Moderate Pain (4 - 6)      Allergies    amoxicillin (Other)    Intolerances    IV Contrast (Flushing (Skin); Nausea)      REVIEW OF SYSTEMS      General: no fevers/chills, no NS	    Skin: see HPI  	  Ophthalmologic: no eye pain or change in vision    Genitourinary: no dysuria or hematuria    Musculoskeletal: no joint pains or weakness	    Neurological:no weakness or tingling          Vital Signs Last 24 Hrs  T(C): 36.4 (29 Oct 2018 04:55), Max: 36.6 (28 Oct 2018 13:32)  T(F): 97.5 (29 Oct 2018 04:55), Max: 97.9 (28 Oct 2018 13:32)  HR: 69 (29 Oct 2018 04:55) (69 - 94)  BP: 146/81 (29 Oct 2018 04:55) (113/68 - 148/66)  BP(mean): --  RR: 18 (29 Oct 2018 04:55) (18 - 18)  SpO2: 95% (29 Oct 2018 04:55) (92% - 95%)    PHYSICAL EXAM:   The patient was alert and oriented X 3, well nourished, and in no  apparent distress.  There was no visible lymphadenopathy.  Conjunctiva were non injected  There was no clubbing or edema of extremities.    Of note on skin exam:   worsening tender ulceration on mid lower abdomen, right lateral thigh, left lateral thigh, b/l inner thighs  no signs of spreading redness, pus or fluctuance    LABS:                        8.0    12.23 )-----------( 378      ( 28 Oct 2018 12:19 )             26.5     10-27    139  |  103  |  8   ----------------------------<  169<H>  3.9   |  25  |  0.54    Ca    9.3      27 Oct 2018 10:48  Phos  3.8     10-27  Mg     1.7     10-28      PT/INR - ( 28 Oct 2018 12:19 )   PT: 30.7 sec;   INR: 2.66 ratio         Acute Hepatitis Panel (10.27.18 @ 11:41)    Hepatitis C Virus Interpretation: Nonreact: Hepatitis C AB  S/CO Ratio                        Interpretation  < 1.0                                     Non-Reactive  1.0 - 4.9                           Weakly-Reactive  > 5.0                                 Reactive  Non-Reactive: A person witha non-reactive HCV antibody result is  considered uninfected.  No further action is needed unless recent  infection is suspected.  In these cases, consider repeat testing later to  detect seroconversion..  Weakly-Reactive: HCV antibody test is abnormal, HCV RNA Qualitative test  will follow.  Reactive: HCV antibody test is abnormal, HCV RNA Qualitative test will  follow.  Note: HCV antibody testing is performed on the Abbott  system.    Hepatitis C Virus S/CO Ratio: 0.19 S/CO    Hepatitis B Core IgM Antibody: Nonreact    Hepatitis B Surface Antigen: Nonreact    Hepatitis A IgM Antibody: Nonreact      1Quantiferon Plus TB (10.27.18 @ 11:41)    Quantiferon TB Plus: INDETERMINATE Results are indeterminate;  possible impaired cellular immune response.  The magnitude of the measured IFN gamma level cannot be correlated to  stage or degree of Infection, level of immune responsiveness, or  likelihood for progressionto active disease.  Negative means no interferon-gamma response to M. tuberculosis antigens  was detected. Infection with M. tuberculosis is NOT likely.  Positive means interferon-gamma response to M. tuberculosis antigens was  detected suggesting infection with M. tuberculosis. False positive  results may occur in patients with prior infection with M. marinum, M.  szulgai or M. kansasii.  Indeterminate means likelihood for M. tuberculosis infection is uncertain.  The results of this test cannot be used alone to make a diagnosis of  active or latent tuberculosis infection. To make either diagnosis, the  patient’s entire medical presentation must be considered. For further  information on the interpretation of this test refer to  (http://www.cdc.gov/nchstp/tb/).    Quantiferon TB Plus Nil: 0.04 IU/mL    Quantiferon TB Plus TB1 minus Nil: -0.01 IU/mL    Quantiferon TB Plus TB2 minus Nil: -0.01 IU/mL    Quant TB Plus Mitogen minus Nil: 0.23 IU/mL        RADIOLOGY & ADDITIONAL TESTS:

## 2018-10-29 NOTE — DISCHARGE NOTE ADULT - MEDICATION SUMMARY - MEDICATIONS TO TAKE
I will START or STAY ON the medications listed below when I get home from the hospital:    Semielectric hospital bed and alternating pressure pad  -- Indication: For Promote wound healing    predniSONE 50 mg oral tablet  -- 1 tab(s) by mouth once a day  -- Indication: For Pyoderma gangrenosum    acetaminophen 325 mg oral tablet  -- 2 tab(s) by mouth every 4 hours, As needed, Temp greater or equal to 38C (100.4F), Mild Pain (1 - 3)  -- Indication: For Fever or pain    traMADol 50 mg oral tablet  -- 0.5 tab(s) by mouth 3 times a day, As needed, Moderate Pain (4 - 6)  -- Indication: For Pain    aspirin 81 mg oral delayed release tablet  -- 1 tab(s) by mouth once a day  -- Indication: For CAD (coronary artery disease)    Coumadin 1 mg oral tablet  -- 2 tab(s) by mouth once a day (at bedtime)  adjust dose to keep INR between 2-3   -- Indication: For History of DVT (deep vein thrombosis)    gabapentin 300 mg oral capsule  -- 1 cap(s) by mouth 3 times a day  -- Indication: For Pain    loratadine 10 mg oral tablet  -- 1 tab(s) by mouth once a day  -- Indication: For Seasonal allergies    simvastatin 20 mg oral tablet  -- 1 tab(s) by mouth once a day (at bedtime)  -- Indication: For High cholesterol    doxycycline monohydrate 100 mg oral capsule  -- 1 cap(s) by mouth every 12 hours  -- Indication: For 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    metoprolol  -- 12.5 milligram(s) by mouth 2 times a day  -- Indication: For Chronic diastolic heart failure    budesonide-formoterol 160 mcg-4.5 mcg/inh inhalation aerosol  -- 1 inhaler(s) inhaled 2 times a day   -- Indication: For Wheezing/SOB    polyethylene glycol 3350 oral powder for reconstitution  -- 17 gram(s) by mouth once a day, As needed, Constipation  -- Indication: For Constipation    melatonin 3 mg oral tablet  -- 1 tab(s) by mouth once a day (at bedtime)  -- Indication: For Insomnia    pantoprazole 40 mg oral delayed release tablet  -- 1 tab(s) by mouth once a day (before a meal)  -- Indication: For GERD (gastroesophageal reflux disease)    calcium-vitamin D 500 mg-200 intl units oral tablet  -- 1 tab(s) by mouth 3 times a day  -- Indication: For Supplement    Multiple Vitamins oral tablet  -- 1 tab(s) by mouth once a day  -- Indication: For Supplement    ascorbic acid 500 mg oral tablet  -- 1 tab(s) by mouth once a day  -- Indication: For Vitamin C supplement    folic acid 1 mg oral tablet  -- 1 tab(s) by mouth once a day  -- Indication: For Supplement

## 2018-10-29 NOTE — DISCHARGE NOTE ADULT - CARE PROVIDER_API CALL
Aki Anderson), Internal Medicine  [address] [facility]  [address] [place] [address]  Phone: [phone]  Fax: [phone]    Elmer Joseph), Surgery  [address] [facility] [address]  [address] [address]  Phone: [phone]  Fax: [phone]

## 2018-10-29 NOTE — DISCHARGE NOTE ADULT - MEDICATION SUMMARY - MEDICATIONS TO STOP TAKING
I will STOP taking the medications listed below when I get home from the hospital:    magnesium hydroxide 8% oral suspension  -- 30 milliliter(s) by mouth once a day, As needed, Constipation    minocycline 100 mg oral capsule  -- 1 cap(s) by mouth 2 times a day

## 2018-10-29 NOTE — PROGRESS NOTE ADULT - ATTENDING COMMENTS
I agree with the above note and have personally seen and examined this patient. All pertinent films have been reviewed. Please refer to clinical documentation of the history, physical examinations, data summary, and both assessment and plan as documented above and with which I agree.    was supposed to see me in office today, but notified by  she was admitted last night for fever work up  no knee pain  has not been oob since last hospitalization  struggling at home to mobilize on her own, her hha that was with her for >1 year quit so lacks support at home  I have been speaking with her  several times over the telephone while she was home and they have NOT followed up with a single physician per instructions since follow up   admits that he SELF-discontinued her prednisone without a taper because he did not want her on steroids anymore.  This was supposed to be directed by endocrinology.    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 requiring I&D, TKA explant, static spacer placement with complex wound closure by plastic surgery complicated by wound healing problems and development of pyoderma granulosum    wounds are managed by PRS/wound care, please reconsult as the patient has had no wound care except visiting nursing since dc to home from last hospitalization  recommend endocrine consult for appropriate steriod taper, per derm needs endocrine consult for assisttance either prior to dc or as outpatient  recommend reconsult derm for guidance on PG  abx per ID, lifelong minocycline for chronic suppression, apprecaite ID input  continue to spend majority of day oob to wheelchair, okay to ambulate but must remain 50% weightbearing on RLE with NO KNEE MOTION allowed, knee immobilizer at all times if OOB  per cardiology, DVT provoked >9 months ago, NO LONGER NEEDS COUMADIN, but while in hospital recommend dvt ppx at discretion of primary team, coumadin recommended against as she cannot follow up easily as outpatient to manage the levels  Nutrition consult recommended, optimize nutrition for continued wound healing  Minimize narcotics for this patient, she is oversedated  F/u with ortho after dc  Recommend dc to subacute rehab as opposed to home after this hospitalization, her  and patient lack the [facility] support system to take care of her at home and she did not follow up with any of her doctors after her prolonged hospitalization    PLEASE OBTAIN XR R KNEE  	  I spoke with Mr. Rdz for >10 minutes today on the telephone and discussed the above with him. He admits that he decided his own steroid taper over the past week because he did not want her on steroids anymore and did this without medical guidance for appropriate length of taper despite recommendation to follow up with endocrinology for this guidance. He is in agreement that the patient needs to be discharged to SNF at the end of this hospitalization as he admits that he cannot take care of her at home.    Nate Bass MD  Attending Orthopedic Surgeon
pt seen and examined.  chart reviewed.  per discussion with pt appendiceal abnormality been present ~ 10yrs.  denies any abd pain.  appendix is unlikely source of fever.  would not recommend sx intervention at this time.  d/w pt and ID team in detail.  will sign off - please reconsult prn.
I have seen and examined pt and agree with A/P

## 2018-10-29 NOTE — PROGRESS NOTE BEHAVIORAL HEALTH - NSBHFUPINTERVALHXFT_PSY_A_CORE
pt denies si/hi. Pt doesn't recall reporting SI over the weekend, "it was probably a gloomy day". Pt denies hx suicide attempts. Pt denies depressed mood, anxiety. no prns given. pt slightly confused, states she has been in the hospital since march.

## 2018-10-29 NOTE — DISCHARGE NOTE ADULT - HOSPITAL COURSE
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     All work-up for infection negative; followed by ID; maintained on suppressive abx for known right knee prosthetic infection with mrsa; remains on coumadin for hx dvt/pe. Followed by wound care 2/2 multiple wounds; surgery & dermatology consulted 2/2 CT finding of thickened appendiceal tail and suspected pyoderma gangrenosum, respectively; No Sgy intvn planned and c/w oral steroid for suspected pyoderma. Psych also consulted for behavioral issues which have resolved. Medically cleared for discharge by Dr Fernandez.

## 2018-10-29 NOTE — PROGRESS NOTE BEHAVIORAL HEALTH - NSBHCHARTREVIEWVS_PSY_A_CORE FT
Vital Signs Last 24 Hrs  T(C): 36.6 (29 Oct 2018 09:34), Max: 36.6 (28 Oct 2018 13:32)  T(F): 97.9 (29 Oct 2018 09:34), Max: 97.9 (28 Oct 2018 13:32)  HR: 74 (29 Oct 2018 11:40) (69 - 94)  BP: 148/75 (29 Oct 2018 11:40) (113/68 - 148/75)  BP(mean): --  RR: 18 (29 Oct 2018 11:40) (18 - 18)  SpO2: 94% (29 Oct 2018 11:40) (92% - 95%)

## 2018-10-29 NOTE — PROGRESS NOTE BEHAVIORAL HEALTH - NSBHCONSULTFOLLOWAFTERCARE_PSY_A_CORE FT
Pt may follow up at [facility] if patient feels she would like to speak with someone on an ongoing basis or initiate antidepressant medications.

## 2018-10-29 NOTE — PROGRESS NOTE ADULT - ASSESSMENT
A/P 77F with wounds to RLE  - Continue with aquacel to moist wounds, cover with gauze and tegaderm or paper tape.  Will place wound care order to clarify recommendations per discussion with nurse    Vandana Mesa PGY3 A/P 77F with wounds to RLE  - Continue with aquacel to moist wounds, cover with gauze and tegaderm or paper tape.  Patient has wound care order in place.    Vandana Mesa PGY3

## 2018-10-29 NOTE — PROGRESS NOTE ADULT - ASSESSMENT
78 yo F with PMH HTN, CAD with PPM readmitted for fevers and worsening ulcer. Currently the presumed diagnosis is pyoderma gangrenosum given significant improvement with prednisone and today's clinical presentation. Patient responded well from 06-10/2018 with prednisone 50mg and recently completed taper with new lesion on left hip. Will restart patient on prednisone given significant improvement with plan to bridge patient to steroid-sparing agent.    -c/w prednisone 50 mg daily  -repeat quantiferon gold given indeterminant result    If patient is discharged to [facility], dermatology will follow up with the provider at [facility] to determine prednisone taper.     Please update in discharge: Patient can follow up with dermatology at [address] [place] [address], Suite [facility] [place], [facility]. Patient should call [phone] to schedule appointment with any provider in 1-2 months.    Patient staffed with Dr. Goodwin.    Please page [phone] with questions.    Malou Dykes MD PGY-2  [facility] Dermatology  Pager: [phone]  Office: [phone] 76 yo F with PMH HTN, CAD with PPM readmitted for fevers and worsening ulcer.   Presumed diagnosis is pyoderma gangrenosum given significant course, today's presentation, as well as hx of improvement with prednisone.   Patient responded well from 06-10/2018 with prednisone 50mg and recently completed taper with new lesion on left hip.     We prefer to use a steroid sparing agent to treat this chronic condition. However, pt also has many other comorbidities which limit options.     Will restart patient on prednisone given significant improvement with plan to bridge patient to steroid-sparing agent.  -c/w prednisone 50 mg daily  -repeat quantiferon gold given recent indeterminant result    If patient is discharged to [facility], dermatology will follow up with the provider at [facility] to review prednisone taper and initiation of steroid sparing agent.     Please update in discharge: Patient can follow up with dermatology at [address] [place] [address], Suite [facility], [place], [facility]. Patient should call [phone] to schedule appointment with any provider in 1mo.    Patient staffed with Dr. Goodwin.    Please page [phone] with questions.    Malou Dykes MD PGY-2  [facility] Dermatology  Pager: [phone]  Office: [phone] Implemented All Universal Safety Interventions:  [place] to call system. Call bell, personal items and telephone within reach. Instruct patient to call for assistance. Room bathroom lighting operational. Non-slip footwear when patient is off stretcher. Physically safe environment: no spills, clutter or unnecessary equipment. Stretcher in lowest position, wheels locked, appropriate side rails in place.

## 2018-10-29 NOTE — DISCHARGE NOTE ADULT - MEDICATION SUMMARY - MEDICATIONS TO CHANGE
I will SWITCH the dose or number of times a day I take the medications listed below when I get home from the hospital:    predniSONE 10 mg oral tablet  -- 2 tab(s) by mouth once a day for 2 days  1 tab by mouth once a day for 2 days  0.5 tab by mouth once a day for 2 days then d/c  -- It is very important that you take or use this exactly as directed.  Do not skip doses or discontinue unless directed by your doctor.  Obtain medical advice before taking any non-prescription drugs as some may affect the action of this medication.  Take with food or milk.

## 2018-10-29 NOTE — PROGRESS NOTE ADULT - ASSESSMENT
Morbid obesity, TAVR  Failed R TKR, s/p explant, MRSA infection, static spacer in place  PMR, presumed pyoderma, managed with steroids.  Coag negative staph in blood, prior PICC removed 9/28.  ERIKA negative   S/p recent steroid taper  Returns with fever but no infection found   Dependent flank edema only   Pred restarted as per Derm  Pt also with hyperpigmented areas from long term Julius use    Plan:   Will switch to Doxy po as extended/indefinite anti-MRSA Tx (no pigment deposition)  Steroids as per Derm  Wt bearing as per Ortho

## 2018-10-29 NOTE — DISCHARGE NOTE ADULT - SECONDARY DIAGNOSIS.
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

## 2018-10-29 NOTE — DISCHARGE NOTE ADULT - PATIENT PORTAL LINK FT
You can access the [facility] Patient Portal, offered by [facility], by registering with the following website: [email or web address]

## 2018-10-29 NOTE — PROGRESS NOTE ADULT - SUBJECTIVE AND OBJECTIVE BOX
CC: f/u for MRSA R knee PJI     Patient reports "I feel good"    REVIEW OF SYSTEMS:  All other review of systems negative (Comprehensive ROS)    Antimicrobials   minocycline IVPB 100 milliGRAM(s) IV Intermittent every 12 hours  Medications Reviewed    T(F): 97.9 (10-29-18 @ 09:34), Max: 97.9 (10-28-18 @ 20:59)  HR: 74 (10-29-18 @ 11:40)  BP: 148/75 (10-29-18 @ 11:40)  RR: 18 (10-29-18 @ 11:40)  SpO2: 94% (10-29-18 @ 11:40)  Wt(kg): --    PHYSICAL EXAM:  General: no acute distress  Eyes:  anicteric, no conjunctival injection, no discharge  Oropharynx: no lesions or injection 	  Neck: without adenopathy  Lungs: clear to auscultation anteriorly  Heart: regular rate and rhythm; no murmur, rubs or gallops  Abdomen: soft, nondistended, nontender  Skin: scattered hyperpigmentation  Extremities: dependent edema  Neurologic: alert, LE weakness    LAB RESULTS:                        7.9    12.2  )-----------( 394      ( 29 Oct 2018 10:30 )             25.0     10-29    139  |  104  |  21  ----------------------------<  172<H>  4.2   |  25  |  0.58    Ca    8.9      29 Oct 2018 10:23  Mg     1.7     10-28    MICROBIOLOGY:  RECENT CULTURES:  Culture - Blood (10.22.18 @ 19:01)    Specimen Source: .Blood Blood-Peripheral    Culture Results:   No growth at 5 days.    RADIOLOGY REVIEWED:  Xray Knee 1 or 2 Views, Right (10.26.18 @ 21:49) >  Orthopedic hardware similar in appearance to prior study. No evidence of   acute complication.

## 2018-10-29 NOTE — PROGRESS NOTE ADULT - SUBJECTIVE AND OBJECTIVE BOX
Plastic Surgery    SUBJECTIVE:   Patient without significant complaints this morning    VITALS  T(C): 36.4 (10-29-18 @ 04:55), Max: 36.6 (10-28-18 @ 13:32)  HR: 69 (10-29-18 @ 04:55) (69 - 94)  BP: 146/81 (10-29-18 @ 04:55) (113/68 - 148/66)  RR: 18 (10-29-18 @ 04:55) (18 - 18)  SpO2: 95% (10-29-18 @ 04:55) (92% - 95%)  CAPILLARY BLOOD GLUCOSE          Is/Os    10-28 @ 07:01  -  10-29 @ 07:00  --------------------------------------------------------  IN:    Oral Fluid: 120 mL  Total IN: 120 mL    OUT:  Total OUT: 0 mL    Total NET: 120 mL          PHYSICAL EXAM:   General: NAD, Lying in bed comfortably  Extrem; Right knee wound with clean base; wound demonstrating decreasing exudate.  Right groin wound with moist exudate with clean appearing wound base; decreasing wound dimension compared to prior exam, appears to be healing without evidence of cellulitis    MEDICATIONS (STANDING): ascorbic acid 500 milliGRAM(s) Oral daily  aspirin enteric coated 81 milliGRAM(s) Oral daily  buDESOnide 160 MICROgram(s)/formoterol 4.5 MICROgram(s) Inhaler 2 Puff(s) Inhalation two times a day  calcium carbonate 1250 mG  + Vitamin D (OsCal 500 + D) 1 Tablet(s) Oral three times a day  folic acid 1 milliGRAM(s) Oral daily  gabapentin 300 milliGRAM(s) Oral three times a day  loratadine 10 milliGRAM(s) Oral daily  melatonin 3 milliGRAM(s) Oral at bedtime  metoprolol tartrate 12.5 milliGRAM(s) Oral two times a day  minocycline IVPB 100 milliGRAM(s) IV Intermittent every 12 hours  multivitamin 1 Tablet(s) Oral daily  pantoprazole    Tablet 40 milliGRAM(s) Oral before breakfast  predniSONE   Tablet 50 milliGRAM(s) Oral daily  simvastatin 20 milliGRAM(s) Oral at bedtime    MEDICATIONS (PRN):acetaminophen   Tablet .. 650 milliGRAM(s) Oral every 4 hours PRN Temp greater or equal to 38C (100.4F), Mild Pain (1 - 3)  polyethylene glycol 3350 17 Gram(s) Oral daily PRN Constipation  traMADol 25 milliGRAM(s) Oral three times a day PRN Moderate Pain (4 - 6)      LABS  CBC (10-28 @ 12:19)                              8.0<L>                         12.23<H>  )----------------(  378        --    % Neutrophils, --    % Lymphocytes, ANC: --                                  26.5<L>    BMP (10-28 @ 10:55)             --      |  --      |  --    		Ca++ --      Ca --                 ---------------------------------( --    		Mg 1.7                --      |  --      |  --    			Ph --          Coags (10-28 @ 12:19)  aPTT -- / INR 2.66<H> / PT 30.7<H>            IMAGING STUDIES

## 2018-10-29 NOTE — PROGRESS NOTE ADULT - REASON FOR ADMISSION
fever

## 2018-10-29 NOTE — PROGRESS NOTE ADULT - SUBJECTIVE AND OBJECTIVE BOX
Patient is a 77y old  Female who presents with a chief complaint of fever (29 Oct 2018 09:30)      SUBJECTIVE / OVERNIGHT EVENTS:  No chest pain. No shortness of breath. No complaints. No events overnight.     MEDICATIONS  (STANDING):  ascorbic acid 500 milliGRAM(s) Oral daily  aspirin enteric coated 81 milliGRAM(s) Oral daily  buDESOnide 160 MICROgram(s)/formoterol 4.5 MICROgram(s) Inhaler 2 Puff(s) Inhalation two times a day  calcium carbonate 1250 mG  + Vitamin D (OsCal 500 + D) 1 Tablet(s) Oral three times a day  folic acid 1 milliGRAM(s) Oral daily  gabapentin 300 milliGRAM(s) Oral three times a day  loratadine 10 milliGRAM(s) Oral daily  melatonin 3 milliGRAM(s) Oral at bedtime  metoprolol tartrate 12.5 milliGRAM(s) Oral two times a day  minocycline IVPB 100 milliGRAM(s) IV Intermittent every 12 hours  multivitamin 1 Tablet(s) Oral daily  pantoprazole    Tablet 40 milliGRAM(s) Oral before breakfast  predniSONE   Tablet 50 milliGRAM(s) Oral daily  simvastatin 20 milliGRAM(s) Oral at bedtime    MEDICATIONS  (PRN):  acetaminophen   Tablet .. 650 milliGRAM(s) Oral every 4 hours PRN Temp greater or equal to 38C (100.4F), Mild Pain (1 - 3)  polyethylene glycol 3350 17 Gram(s) Oral daily PRN Constipation  traMADol 25 milliGRAM(s) Oral three times a day PRN Moderate Pain (4 - 6)      Vital Signs Last 24 Hrs  T(C): 36.6 (29 Oct 2018 09:34), Max: 36.6 (28 Oct 2018 13:32)  T(F): 97.9 (29 Oct 2018 09:34), Max: 97.9 (28 Oct 2018 13:32)  HR: 70 (29 Oct 2018 09:34) (69 - 94)  BP: 129/79 (29 Oct 2018 09:34) (113/68 - 148/66)  BP(mean): --  RR: 18 (29 Oct 2018 09:34) (18 - 18)  SpO2: 92% (29 Oct 2018 09:34) (92% - 95%)  CAPILLARY BLOOD GLUCOSE        I&O's Summary    28 Oct 2018 07:01  -  29 Oct 2018 07:00  --------------------------------------------------------  IN: 460 mL / OUT: 0 mL / NET: 460 mL        PHYSICAL EXAM:  GENERAL: NAD, well-developed, obese  HEAD:  Atraumatic, Normocephalic  EYES: EOMI, PERRLA, conjunctiva and sclera clear  NECK: Supple, No JVD  CHEST/LUNG: Clear to auscultation bilaterally; No wheeze  HEART: Regular rate and rhythm; No murmurs, rubs, or gallops  ABDOMEN: Soft, Nontender, Nondistended; Bowel sounds present  EXTREMITIES:  2+ Peripheral Pulses, No clubbing, cyanosis, or edema  PSYCH: AAOx3  NEUROLOGY: non-focal  SKIN: No rashes or lesions, multiple wounds in luann le, dressing c/d/i    LABS:                        7.9    12.2  )-----------( 394      ( 29 Oct 2018 10:30 )             25.0     10-29    139  |  104  |  21  ----------------------------<  172<H>  4.2   |  25  |  0.58    Ca    8.9      29 Oct 2018 10:23  Mg     1.7     10-28      PT/INR - ( 29 Oct 2018 10:29 )   PT: 27.1 sec;   INR: 2.46 ratio                   RADIOLOGY & ADDITIONAL TESTS:    Imaging Personally Reviewed:    Consultant(s) Notes Reviewed:      Care Discussed with Consultants/Other Providers:

## 2018-10-29 NOTE — PROGRESS NOTE BEHAVIORAL HEALTH - NSBHCHARTREVIEWLAB_PSY_A_CORE FT
7.9    12.2  )-----------( 394      ( 29 Oct 2018 10:30 )             25.0     10-29    139  |  104  |  21  ----------------------------<  172<H>  4.2   |  25  |  0.58    Ca    8.9      29 Oct 2018 10:23  Mg     1.7     10-28

## 2018-10-29 NOTE — DISCHARGE NOTE ADULT - PLAN OF CARE
Resolves Continue with Prednisone as prescribed.  Dermatology will follow up with you at [facility] for Prednisone tapering.  Follow up with Dermatology as noted below after discharge from Rehab.  Follow up with your primary healthcare provider after discharge from Rehab. Daily wound care as follow:  Cleanse all wounds with saline, pat dry, apply Cavilon to periwound skin:  1. left abdominal pannus-tuck Aquacel into skin folds  2. Right anterior thigh-Allevyn foam  3. Right lateral thigh-Medihoney colloid to wound base, pack with Aquacel rope + gauze + Tegaderm  4. Right knee-Aquacel to wound bed + gauze + Tegaderm  5. Left anterior thigh-Medihoney colloid + gauze + Tegaderm  6. Left lateral thigh-Pack with Aquacel rope + gauze + Tegaderm  Follow up at wound care clinic after discharge from Rehab Continue with suppressive antibiotic treatment. Continue with anticoagulation with Coumadin.  Follow up with your primary healthcare provider after discharge from Rehab. Condition resolved.  Drink at least 1 liter water daily. Weigh yourself daily.  If you gain 3lbs in 3 days, or 5lbs in a week call your Health Care Provider.  Do not eat or drink foods containing more than 2000mg of salt (sodium) in your diet every day.  Call your Health Care Provider if you have any swelling or increased swelling in your feet, ankles, and/or stomach.  Take all of your medication as directed.  If you become dizzy call your Health Care Provider. Condition back to baseline mentation.

## 2018-10-29 NOTE — DISCHARGE NOTE ADULT - CARE PLAN
Principal Discharge DX:	Pyoderma gangrenosum  Goal:	Resolves  Assessment and plan of treatment:	Continue with Prednisone as prescribed.  Dermatology will follow up with you at [facility] for Prednisone tapering.  Follow up with Dermatology as noted below after discharge from Rehab.  Follow up with your primary healthcare provider after discharge from Rehab.  Secondary Diagnosis:	Open wounds involving other combinations of body regions  Assessment and plan of treatment:	Daily wound care as follow:  Cleanse all wounds with saline, pat dry, apply Cavilon to periwound skin:  1. left abdominal pannus-tuck Aquacel into skin folds  2. Right anterior thigh-Allevyn foam  3. Right lateral thigh-Medihoney colloid to wound base, pack with Aquacel rope + gauze + Tegaderm  4. Right knee-Aquacel to wound bed + gauze + Tegaderm  5. Left anterior thigh-Medihoney colloid + gauze + Tegaderm  6. 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  Secondary Diagnosis:	History of DVT (deep vein thrombosis)  Assessment and plan of treatment:	Continue with anticoagulation with Coumadin.  Follow up with your primary healthcare provider after discharge from Rehab.  Secondary Diagnosis:	Dehydration  Assessment and plan of treatment:	Condition resolved.  Drink at least 1 liter water daily.  Secondary Diagnosis:	Chronic diastolic heart failure  Assessment and plan of treatment:	Weigh yourself daily.  If you gain 3lbs in 3 days, or 5lbs in a week call your Health Care Provider.  Do not eat or drink foods containing more than 2000mg of salt (sodium) in your diet every day.  Call your Health Care Provider if you have any swelling or increased swelling in your feet, ankles, and/or stomach.  Take all of your medication as directed.  If you become dizzy call your Health Care Provider.  Secondary Diagnosis:	Delirium due to another medical condition  Assessment and plan of treatment:	Condition back to baseline mentation.

## 2018-11-05 ENCOUNTER — APPOINTMENT (OUTPATIENT)
Dept: WOUND CARE | Facility: CLINIC | Age: 78
End: 2018-11-05

## 2018-11-06 ENCOUNTER — APPOINTMENT (OUTPATIENT)
Dept: PLASTIC SURGERY | Facility: CLINIC | Age: 78
End: 2018-11-06

## 2018-11-11 PROCEDURE — 82272 OCCULT BLD FECES 1-3 TESTS: CPT

## 2018-11-11 PROCEDURE — 82962 GLUCOSE BLOOD TEST: CPT

## 2018-11-11 PROCEDURE — 80053 COMPREHEN METABOLIC PANEL: CPT

## 2018-11-11 PROCEDURE — 87086 URINE CULTURE/COLONY COUNT: CPT

## 2018-11-11 PROCEDURE — 85027 COMPLETE CBC AUTOMATED: CPT

## 2018-11-11 PROCEDURE — 82607 VITAMIN B-12: CPT

## 2018-11-11 PROCEDURE — 94640 AIRWAY INHALATION TREATMENT: CPT

## 2018-11-11 PROCEDURE — 71260 CT THORAX DX C+: CPT

## 2018-11-11 PROCEDURE — 87040 BLOOD CULTURE FOR BACTERIA: CPT

## 2018-11-11 PROCEDURE — 83550 IRON BINDING TEST: CPT

## 2018-11-11 PROCEDURE — 97606 NEG PRS WND THER DME>50 SQCM: CPT

## 2018-11-11 PROCEDURE — P9016: CPT

## 2018-11-11 PROCEDURE — 85730 THROMBOPLASTIN TIME PARTIAL: CPT

## 2018-11-11 PROCEDURE — 82533 TOTAL CORTISOL: CPT

## 2018-11-11 PROCEDURE — 51701 INSERT BLADDER CATHETER: CPT

## 2018-11-11 PROCEDURE — 87633 RESP VIRUS 12-25 TARGETS: CPT

## 2018-11-11 PROCEDURE — 81001 URINALYSIS AUTO W/SCOPE: CPT

## 2018-11-11 PROCEDURE — 86901 BLOOD TYPING SEROLOGIC RH(D): CPT

## 2018-11-11 PROCEDURE — 93306 TTE W/DOPPLER COMPLETE: CPT

## 2018-11-11 PROCEDURE — 83735 ASSAY OF MAGNESIUM: CPT

## 2018-11-11 PROCEDURE — 36430 TRANSFUSION BLD/BLD COMPNT: CPT | Mod: XU

## 2018-11-11 PROCEDURE — 87581 M.PNEUMON DNA AMP PROBE: CPT

## 2018-11-11 PROCEDURE — 97110 THERAPEUTIC EXERCISES: CPT

## 2018-11-11 PROCEDURE — 76705 ECHO EXAM OF ABDOMEN: CPT

## 2018-11-11 PROCEDURE — 99285 EMERGENCY DEPT VISIT HI MDM: CPT | Mod: 25

## 2018-11-11 PROCEDURE — 87186 SC STD MICRODIL/AGAR DIL: CPT

## 2018-11-11 PROCEDURE — 97530 THERAPEUTIC ACTIVITIES: CPT

## 2018-11-11 PROCEDURE — 80202 ASSAY OF VANCOMYCIN: CPT

## 2018-11-11 PROCEDURE — 74177 CT ABD & PELVIS W/CONTRAST: CPT

## 2018-11-11 PROCEDURE — 82435 ASSAY OF BLOOD CHLORIDE: CPT

## 2018-11-11 PROCEDURE — 82728 ASSAY OF FERRITIN: CPT

## 2018-11-11 PROCEDURE — 87150 DNA/RNA AMPLIFIED PROBE: CPT

## 2018-11-11 PROCEDURE — 82746 ASSAY OF FOLIC ACID SERUM: CPT

## 2018-11-11 PROCEDURE — 86480 TB TEST CELL IMMUN MEASURE: CPT

## 2018-11-11 PROCEDURE — 86922 COMPATIBILITY TEST ANTIGLOB: CPT

## 2018-11-11 PROCEDURE — 85610 PROTHROMBIN TIME: CPT

## 2018-11-11 PROCEDURE — 71275 CT ANGIOGRAPHY CHEST: CPT

## 2018-11-11 PROCEDURE — 93005 ELECTROCARDIOGRAM TRACING: CPT | Mod: XU

## 2018-11-11 PROCEDURE — 97602 WOUND(S) CARE NON-SELECTIVE: CPT

## 2018-11-11 PROCEDURE — 96374 THER/PROPH/DIAG INJ IV PUSH: CPT | Mod: XU

## 2018-11-11 PROCEDURE — 87798 DETECT AGENT NOS DNA AMP: CPT

## 2018-11-11 PROCEDURE — 84295 ASSAY OF SERUM SODIUM: CPT

## 2018-11-11 PROCEDURE — 86870 RBC ANTIBODY IDENTIFICATION: CPT

## 2018-11-11 PROCEDURE — 87486 CHLMYD PNEUM DNA AMP PROBE: CPT

## 2018-11-11 PROCEDURE — 86900 BLOOD TYPING SEROLOGIC ABO: CPT

## 2018-11-11 PROCEDURE — 71045 X-RAY EXAM CHEST 1 VIEW: CPT

## 2018-11-11 PROCEDURE — 93308 TTE F-UP OR LMTD: CPT

## 2018-11-11 PROCEDURE — 82330 ASSAY OF CALCIUM: CPT

## 2018-11-11 PROCEDURE — 82947 ASSAY GLUCOSE BLOOD QUANT: CPT

## 2018-11-11 PROCEDURE — 73560 X-RAY EXAM OF KNEE 1 OR 2: CPT

## 2018-11-11 PROCEDURE — 85014 HEMATOCRIT: CPT

## 2018-11-11 PROCEDURE — 84132 ASSAY OF SERUM POTASSIUM: CPT

## 2018-11-11 PROCEDURE — 80048 BASIC METABOLIC PNL TOTAL CA: CPT

## 2018-11-11 PROCEDURE — 51702 INSERT TEMP BLADDER CATH: CPT

## 2018-11-11 PROCEDURE — 82803 BLOOD GASES ANY COMBINATION: CPT

## 2018-11-11 PROCEDURE — 97112 NEUROMUSCULAR REEDUCATION: CPT

## 2018-11-11 PROCEDURE — 84100 ASSAY OF PHOSPHORUS: CPT

## 2018-11-11 PROCEDURE — 93312 ECHO TRANSESOPHAGEAL: CPT

## 2018-11-11 PROCEDURE — 80074 ACUTE HEPATITIS PANEL: CPT

## 2018-11-11 PROCEDURE — 97605 NEG PRS WND THER DME<=50SQCM: CPT

## 2018-11-11 PROCEDURE — 86880 COOMBS TEST DIRECT: CPT

## 2018-11-11 PROCEDURE — 86902 BLOOD TYPE ANTIGEN DONOR EA: CPT

## 2018-11-11 PROCEDURE — 83605 ASSAY OF LACTIC ACID: CPT

## 2018-11-11 PROCEDURE — 97162 PT EVAL MOD COMPLEX 30 MIN: CPT

## 2018-11-11 PROCEDURE — 73562 X-RAY EXAM OF KNEE 3: CPT

## 2018-11-11 PROCEDURE — 86850 RBC ANTIBODY SCREEN: CPT

## 2018-11-11 PROCEDURE — 84443 ASSAY THYROID STIM HORMONE: CPT

## 2018-11-12 ENCOUNTER — INPATIENT (INPATIENT)
Facility: HOSPITAL | Age: 78
LOS: 9 days | Discharge: HOME | DRG: 872 | End: 2018-11-22
Attending: INTERNAL MEDICINE | Admitting: SURGERY
Payer: MEDICARE

## 2018-11-12 VITALS
HEART RATE: 94 BPM | RESPIRATION RATE: 18 BRPM | SYSTOLIC BLOOD PRESSURE: 147 MMHG | OXYGEN SATURATION: 94 % | DIASTOLIC BLOOD PRESSURE: 82 MMHG

## 2018-11-12 DIAGNOSIS — Z96.659 PRESENCE OF UNSPECIFIED ARTIFICIAL KNEE JOINT: Chronic | ICD-10-CM

## 2018-11-12 DIAGNOSIS — Z95.2 PRESENCE OF PROSTHETIC HEART VALVE: Chronic | ICD-10-CM

## 2018-11-12 DIAGNOSIS — Z95.0 PRESENCE OF CARDIAC PACEMAKER: Chronic | ICD-10-CM

## 2018-11-12 DIAGNOSIS — S31.819A UNSPECIFIED OPEN WOUND OF RIGHT BUTTOCK, INITIAL ENCOUNTER: ICD-10-CM

## 2018-11-12 DIAGNOSIS — Z98.890 OTHER SPECIFIED POSTPROCEDURAL STATES: Chronic | ICD-10-CM

## 2018-11-12 LAB
ALBUMIN SERPL ELPH-MCNC: 2.7 G/DL — LOW (ref 3.3–5)
ALP SERPL-CCNC: 72 U/L — SIGNIFICANT CHANGE UP (ref 40–120)
ALT FLD-CCNC: 14 U/L — SIGNIFICANT CHANGE UP (ref 10–45)
ANION GAP SERPL CALC-SCNC: 10 MMOL/L — SIGNIFICANT CHANGE UP (ref 5–17)
ANTIBODY ID 1_1: SIGNIFICANT CHANGE UP
ANTIBODY ID 1_2: SIGNIFICANT CHANGE UP
APPEARANCE UR: CLEAR — SIGNIFICANT CHANGE UP
APTT BLD: 35.1 SEC — SIGNIFICANT CHANGE UP (ref 27.5–36.3)
AST SERPL-CCNC: 12 U/L — SIGNIFICANT CHANGE UP (ref 10–40)
BACTERIA # UR AUTO: NEGATIVE — SIGNIFICANT CHANGE UP
BASOPHILS # BLD AUTO: 0 K/UL — SIGNIFICANT CHANGE UP (ref 0–0.2)
BASOPHILS NFR BLD AUTO: 0 % — SIGNIFICANT CHANGE UP (ref 0–2)
BILIRUB SERPL-MCNC: 0.3 MG/DL — SIGNIFICANT CHANGE UP (ref 0.2–1.2)
BILIRUB UR-MCNC: NEGATIVE — SIGNIFICANT CHANGE UP
BLD GP AB SCN SERPL QL: POSITIVE — SIGNIFICANT CHANGE UP
BUN SERPL-MCNC: 25 MG/DL — HIGH (ref 7–23)
CALCIUM SERPL-MCNC: 8.7 MG/DL — SIGNIFICANT CHANGE UP (ref 8.4–10.5)
CHLORIDE SERPL-SCNC: 105 MMOL/L — SIGNIFICANT CHANGE UP (ref 96–108)
CO2 SERPL-SCNC: 31 MMOL/L — SIGNIFICANT CHANGE UP (ref 22–31)
COLOR SPEC: SIGNIFICANT CHANGE UP
CREAT SERPL-MCNC: 0.77 MG/DL — SIGNIFICANT CHANGE UP (ref 0.5–1.3)
DAT POLY-SP REAG RBC QL: NEGATIVE — SIGNIFICANT CHANGE UP
DIFF PNL FLD: NEGATIVE — SIGNIFICANT CHANGE UP
EOSINOPHIL # BLD AUTO: 0.2 K/UL — SIGNIFICANT CHANGE UP (ref 0–0.5)
EOSINOPHIL NFR BLD AUTO: 1.3 % — SIGNIFICANT CHANGE UP (ref 0–6)
EPI CELLS # UR: 2 /HPF — SIGNIFICANT CHANGE UP
GLUCOSE SERPL-MCNC: 89 MG/DL — SIGNIFICANT CHANGE UP (ref 70–99)
GLUCOSE UR QL: NEGATIVE — SIGNIFICANT CHANGE UP
HCT VFR BLD CALC: 31 % — LOW (ref 34.5–45)
HGB BLD-MCNC: 9.6 G/DL — LOW (ref 11.5–15.5)
HYALINE CASTS # UR AUTO: 1 /LPF — SIGNIFICANT CHANGE UP (ref 0–2)
INR BLD: 2.61 RATIO — HIGH (ref 0.88–1.16)
KETONES UR-MCNC: NEGATIVE — SIGNIFICANT CHANGE UP
LACTATE BLDV-MCNC: 2 MMOL/L — SIGNIFICANT CHANGE UP (ref 0.7–2)
LEUKOCYTE ESTERASE UR-ACNC: ABNORMAL
LYMPHOCYTES # BLD AUTO: 16.2 % — SIGNIFICANT CHANGE UP (ref 13–44)
LYMPHOCYTES # BLD AUTO: 2.8 K/UL — SIGNIFICANT CHANGE UP (ref 1–3.3)
MCHC RBC-ENTMCNC: 26.8 PG — LOW (ref 27–34)
MCHC RBC-ENTMCNC: 31.1 GM/DL — LOW (ref 32–36)
MCV RBC AUTO: 86.2 FL — SIGNIFICANT CHANGE UP (ref 80–100)
MONOCYTES # BLD AUTO: 0.6 K/UL — SIGNIFICANT CHANGE UP (ref 0–0.9)
MONOCYTES NFR BLD AUTO: 3.3 % — SIGNIFICANT CHANGE UP (ref 2–14)
NEUTROPHILS # BLD AUTO: 13.9 K/UL — HIGH (ref 1.8–7.4)
NEUTROPHILS NFR BLD AUTO: 79.1 % — HIGH (ref 43–77)
NITRITE UR-MCNC: NEGATIVE — SIGNIFICANT CHANGE UP
PH UR: 6 — SIGNIFICANT CHANGE UP (ref 5–8)
PLATELET # BLD AUTO: 264 K/UL — SIGNIFICANT CHANGE UP (ref 150–400)
POTASSIUM SERPL-MCNC: 5 MMOL/L — SIGNIFICANT CHANGE UP (ref 3.5–5.3)
POTASSIUM SERPL-SCNC: 5 MMOL/L — SIGNIFICANT CHANGE UP (ref 3.5–5.3)
PROT SERPL-MCNC: 5.9 G/DL — LOW (ref 6–8.3)
PROT UR-MCNC: ABNORMAL
PROTHROM AB SERPL-ACNC: 30.6 SEC — HIGH (ref 10–12.9)
RBC # BLD: 3.59 M/UL — LOW (ref 3.8–5.2)
RBC # FLD: 20.2 % — HIGH (ref 10.3–14.5)
RBC CASTS # UR COMP ASSIST: 5 /HPF — HIGH (ref 0–4)
RH IG SCN BLD-IMP: POSITIVE — SIGNIFICANT CHANGE UP
SODIUM SERPL-SCNC: 146 MMOL/L — HIGH (ref 135–145)
SP GR SPEC: 1.04 — HIGH (ref 1.01–1.02)
UROBILINOGEN FLD QL: NEGATIVE — SIGNIFICANT CHANGE UP
WBC # BLD: 17.5 K/UL — HIGH (ref 3.8–10.5)
WBC # FLD AUTO: 17.5 K/UL — HIGH (ref 3.8–10.5)
WBC UR QL: 28 /HPF — HIGH (ref 0–5)

## 2018-11-12 PROCEDURE — 74177 CT ABD & PELVIS W/CONTRAST: CPT | Mod: 26

## 2018-11-12 PROCEDURE — 99222 1ST HOSP IP/OBS MODERATE 55: CPT

## 2018-11-12 PROCEDURE — 99291 CRITICAL CARE FIRST HOUR: CPT | Mod: GC

## 2018-11-12 PROCEDURE — 86077 PHYS BLOOD BANK SERV XMATCH: CPT

## 2018-11-12 PROCEDURE — 71045 X-RAY EXAM CHEST 1 VIEW: CPT | Mod: 26

## 2018-11-12 PROCEDURE — 99232 SBSQ HOSP IP/OBS MODERATE 35: CPT

## 2018-11-12 RX ORDER — SODIUM CHLORIDE 9 MG/ML
1000 INJECTION INTRAMUSCULAR; INTRAVENOUS; SUBCUTANEOUS ONCE
Qty: 0 | Refills: 0 | Status: DISCONTINUED | OUTPATIENT
Start: 2018-11-12 | End: 2018-11-12

## 2018-11-12 RX ORDER — FENTANYL CITRATE 50 UG/ML
50 INJECTION INTRAVENOUS ONCE
Qty: 0 | Refills: 0 | Status: DISCONTINUED | OUTPATIENT
Start: 2018-11-12 | End: 2018-11-12

## 2018-11-12 RX ORDER — MEROPENEM 1 G/30ML
2000 INJECTION INTRAVENOUS EVERY 8 HOURS
Qty: 0 | Refills: 0 | Status: DISCONTINUED | OUTPATIENT
Start: 2018-11-12 | End: 2018-11-20

## 2018-11-12 RX ORDER — ACETAMINOPHEN 500 MG
1000 TABLET ORAL ONCE
Qty: 0 | Refills: 0 | Status: COMPLETED | OUTPATIENT
Start: 2018-11-12 | End: 2018-11-12

## 2018-11-12 RX ORDER — HYDROMORPHONE HYDROCHLORIDE 2 MG/ML
0.5 INJECTION INTRAMUSCULAR; INTRAVENOUS; SUBCUTANEOUS EVERY 4 HOURS
Qty: 0 | Refills: 0 | Status: DISCONTINUED | OUTPATIENT
Start: 2018-11-12 | End: 2018-11-19

## 2018-11-12 RX ORDER — PANTOPRAZOLE SODIUM 20 MG/1
40 TABLET, DELAYED RELEASE ORAL
Qty: 0 | Refills: 0 | Status: DISCONTINUED | OUTPATIENT
Start: 2018-11-12 | End: 2018-11-22

## 2018-11-12 RX ORDER — SIMVASTATIN 20 MG/1
20 TABLET, FILM COATED ORAL AT BEDTIME
Qty: 0 | Refills: 0 | Status: DISCONTINUED | OUTPATIENT
Start: 2018-11-12 | End: 2018-11-22

## 2018-11-12 RX ORDER — GABAPENTIN 400 MG/1
300 CAPSULE ORAL THREE TIMES A DAY
Qty: 0 | Refills: 0 | Status: DISCONTINUED | OUTPATIENT
Start: 2018-11-12 | End: 2018-11-22

## 2018-11-12 RX ORDER — VANCOMYCIN HCL 1 G
1500 VIAL (EA) INTRAVENOUS EVERY 12 HOURS
Qty: 0 | Refills: 0 | Status: DISCONTINUED | OUTPATIENT
Start: 2018-11-12 | End: 2018-11-15

## 2018-11-12 RX ORDER — SODIUM CHLORIDE 9 MG/ML
1000 INJECTION, SOLUTION INTRAVENOUS
Qty: 0 | Refills: 0 | Status: DISCONTINUED | OUTPATIENT
Start: 2018-11-12 | End: 2018-11-19

## 2018-11-12 RX ORDER — MEROPENEM 1 G/30ML
INJECTION INTRAVENOUS
Qty: 0 | Refills: 0 | Status: DISCONTINUED | OUTPATIENT
Start: 2018-11-12 | End: 2018-11-20

## 2018-11-12 RX ORDER — VANCOMYCIN HCL 1 G
VIAL (EA) INTRAVENOUS
Qty: 0 | Refills: 0 | Status: DISCONTINUED | OUTPATIENT
Start: 2018-11-12 | End: 2018-11-15

## 2018-11-12 RX ORDER — SODIUM CHLORIDE 9 MG/ML
2000 INJECTION INTRAMUSCULAR; INTRAVENOUS; SUBCUTANEOUS ONCE
Qty: 0 | Refills: 0 | Status: COMPLETED | OUTPATIENT
Start: 2018-11-12 | End: 2018-11-12

## 2018-11-12 RX ORDER — BUDESONIDE AND FORMOTEROL FUMARATE DIHYDRATE 160; 4.5 UG/1; UG/1
2 AEROSOL RESPIRATORY (INHALATION)
Qty: 0 | Refills: 0 | Status: DISCONTINUED | OUTPATIENT
Start: 2018-11-12 | End: 2018-11-22

## 2018-11-12 RX ORDER — MEROPENEM 1 G/30ML
2000 INJECTION INTRAVENOUS ONCE
Qty: 0 | Refills: 0 | Status: COMPLETED | OUTPATIENT
Start: 2018-11-12 | End: 2018-11-12

## 2018-11-12 RX ORDER — VANCOMYCIN HCL 1 G
1500 VIAL (EA) INTRAVENOUS ONCE
Qty: 0 | Refills: 0 | Status: COMPLETED | OUTPATIENT
Start: 2018-11-12 | End: 2018-11-12

## 2018-11-12 RX ADMIN — SODIUM CHLORIDE 1333.33 MILLILITER(S): 9 INJECTION INTRAMUSCULAR; INTRAVENOUS; SUBCUTANEOUS at 06:00

## 2018-11-12 RX ADMIN — MEROPENEM 200 MILLIGRAM(S): 1 INJECTION INTRAVENOUS at 18:32

## 2018-11-12 RX ADMIN — FENTANYL CITRATE 50 MICROGRAM(S): 50 INJECTION INTRAVENOUS at 09:00

## 2018-11-12 RX ADMIN — Medication 300 MILLIGRAM(S): at 22:51

## 2018-11-12 RX ADMIN — HYDROMORPHONE HYDROCHLORIDE 0.5 MILLIGRAM(S): 2 INJECTION INTRAMUSCULAR; INTRAVENOUS; SUBCUTANEOUS at 18:32

## 2018-11-12 RX ADMIN — Medication 1000 MILLIGRAM(S): at 07:30

## 2018-11-12 RX ADMIN — Medication 300 MILLIGRAM(S): at 06:55

## 2018-11-12 RX ADMIN — Medication 1500 MILLIGRAM(S): at 08:50

## 2018-11-12 RX ADMIN — FENTANYL CITRATE 50 MICROGRAM(S): 50 INJECTION INTRAVENOUS at 12:55

## 2018-11-12 RX ADMIN — Medication 400 MILLIGRAM(S): at 23:41

## 2018-11-12 RX ADMIN — HYDROMORPHONE HYDROCHLORIDE 0.5 MILLIGRAM(S): 2 INJECTION INTRAMUSCULAR; INTRAVENOUS; SUBCUTANEOUS at 18:47

## 2018-11-12 RX ADMIN — SODIUM CHLORIDE 2000 MILLILITER(S): 9 INJECTION INTRAMUSCULAR; INTRAVENOUS; SUBCUTANEOUS at 07:10

## 2018-11-12 RX ADMIN — Medication 400 MILLIGRAM(S): at 06:33

## 2018-11-12 RX ADMIN — MEROPENEM 200 MILLIGRAM(S): 1 INJECTION INTRAVENOUS at 08:53

## 2018-11-12 RX ADMIN — FENTANYL CITRATE 50 MICROGRAM(S): 50 INJECTION INTRAVENOUS at 06:33

## 2018-11-12 RX ADMIN — SODIUM CHLORIDE 75 MILLILITER(S): 9 INJECTION, SOLUTION INTRAVENOUS at 22:51

## 2018-11-12 RX ADMIN — FENTANYL CITRATE 50 MICROGRAM(S): 50 INJECTION INTRAVENOUS at 11:20

## 2018-11-12 RX ADMIN — MEROPENEM 2000 MILLIGRAM(S): 1 INJECTION INTRAVENOUS at 09:25

## 2018-11-12 RX ADMIN — Medication 1000 MILLIGRAM(S): at 06:50

## 2018-11-12 NOTE — ED PROVIDER NOTE - CARE PLAN
Principal Discharge DX:	Necrotizing fasciitis Principal Discharge DX:	Necrotizing fasciitis  Secondary Diagnosis:	Sepsis  Secondary Diagnosis:	Wound infection Principal Discharge DX:	Buttock wound, right, initial encounter  Secondary Diagnosis:	Sepsis  Secondary Diagnosis:	Wound infection

## 2018-11-12 NOTE — PROGRESS NOTE ADULT - SUBJECTIVE AND OBJECTIVE BOX
SUBJECTIVE: Pt seen, chart reviewed.  79 yo female , with complicated history, known from multiple previous admissions  During the past year,  had explant of right TKR, and subsequent placement of abx spacer  This was associated with a complex Plastics wound closure, and prolonged need for wound VAC  Developed multiple other wounds , which progressed and failed to heal, prompting Dx of PG and institution of Prednisone over the past several months  Has been non ambulatory and maintained on Coumadin for h/o DVT, with resultant therapeutic INR and PT  Current admission prompted by fever, and right thigh  wound drainage  Family not present at time of evaluation  Long time aide, Lissa  has been replaced by Chau, currently present  Labile BP responded to fluid administration    Allergies    amoxicillin (Other)    Intolerances    IV Contrast (Flushing (Skin); Nausea)      meropenem  IVPB      meropenem  IVPB 2000 milliGRAM(s) IV Intermittent every 8 hours  vancomycin  IVPB      vancomycin  IVPB 1500 milliGRAM(s) IV Intermittent every 12 hours      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  Artificial cardiac pacemaker: 12/25/17  H/O cardiac catheterization: 11/29/16 HERBERT placed on LAD  H/O endoscopy: with dilatation of esophageal stricture 2013  S/P cataract surgery: x2; 3-4yr ago  S/P gastroplasty: 28 yrs ago  S/P cholecystectomy: more than 15 years ago  S/P total knee replacement: left, 15yr ago  S/P total knee replacement: right, 12yr ago  S/P hysterectomy: 24yr ago      HEALTH ISSUES - PROBLEM Dx:          FAMILY HISTORY:  No pertinent family history in first degree relatives      Physical Exam:  Vital Signs Last 24 Hrs  T(C): 37.2 (12 Nov 2018 07:24), Max: 39.1 (12 Nov 2018 05:35)  T(F): 98.9 (12 Nov 2018 07:24), Max: 102.3 (12 Nov 2018 05:35)  HR: 82 (12 Nov 2018 07:24) (80 - 94)    Mandel facies , supine in bed , using nasal O2  Unable to turn, and turning patient requires multiple personal   Patient loudly protests being turned  Patient remains very obese  The lower abdominal wound has healed   The right knee wound has been followed by Plastics , and is currently dressed  There are multiple FT substantial wounds of right lateral proximal thigh which have not healed , but which appear clean  There is an approx 6x6 cm necrotic eschar of the right proximal thigh , with an open portion, draining foul smelling fluid, possibly a liquified hematoma  I can not appreciate SQ emphysema  Given poor patient tolerance, expeditious packing of wounds of right lateral proximal thigh ,with saline soaked gauze,  done  Bedside debridement is not feasible   called 's cell and message left  Findings d/w ED attending  patient has been evaluated by Surgery    Consider Steroid bolus needed,  given chronic therapy with 50mg Prednisone      BP: 110/46 (12 Nov 2018 08:46) (84/37 - 147/82)  BP(mean): --  RR: 18 (12 Nov 2018 07:24) (16 - 18)  SpO2: 98% (12 Nov 2018 07:24) (92% - 99%)    LABS:                        9.6    17.5  )-----------( 264      ( 12 Nov 2018 06:07 )             31.0     PT/INR - ( 12 Nov 2018 06:09 )   PT: 30.6 sec;   INR: 2.61 ratio         PTT - ( 12 Nov 2018 06:09 )  PTT:35.1 sec      Outpatient follow up information provided- [phone] SUBJECTIVE: Pt seen, chart reviewed.  77 yo female , with complicated history, known from multiple previous admissions  During the past year,  had explant of right TKR, and subsequent placement of abx spacer  This was associated with a complex Plastics wound closure, and prolonged need for wound VAC  Developed multiple other wounds , which progressed and failed to heal, prompting Dx of PG and institution of Prednisone over the past several months  Has been non ambulatory and maintained on Coumadin for h/o DVT, with resultant therapeutic INR and PT  Current admission prompted by fever, and right thigh  wound drainage  Family not present at time of evaluation  Long time aide, Lissa  has been replaced by Chau, currently present  Labile BP responded to fluid administration    Allergies    amoxicillin (Other)    Intolerances    IV Contrast (Flushing (Skin); Nausea)      meropenem  IVPB      meropenem  IVPB 2000 milliGRAM(s) IV Intermittent every 8 hours  vancomycin  IVPB      vancomycin  IVPB 1500 milliGRAM(s) IV Intermittent every 12 hours      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  Artificial cardiac pacemaker: 12/25/17  H/O cardiac catheterization: 11/29/16 HERBERT placed on LAD  H/O endoscopy: with dilatation of esophageal stricture 2013  S/P cataract surgery: x2; 3-4yr ago  S/P gastroplasty: 28 yrs ago  S/P cholecystectomy: more than 15 years ago  S/P total knee replacement: left, 15yr ago  S/P total knee replacement: right, 12yr ago  S/P hysterectomy: 24yr ago      HEALTH ISSUES - PROBLEM Dx:          FAMILY HISTORY:  No pertinent family history in first degree relatives      Physical Exam:  Vital Signs Last 24 Hrs  T(C): 37.2 (12 Nov 2018 07:24), Max: 39.1 (12 Nov 2018 05:35)  T(F): 98.9 (12 Nov 2018 07:24), Max: 102.3 (12 Nov 2018 05:35)  HR: 82 (12 Nov 2018 07:24) (80 - 94)    Has completed CT A&P- results reviewed    Moon facies , supine in bed , using nasal O2  Unable to turn, and turning patient requires multiple personal   Patient loudly protests being turned  Patient remains very obese  The lower abdominal wound has healed   The right knee wound has been followed by Plastics , and is currently dressed  There are multiple FT substantial wounds of right lateral proximal thigh which have not healed , but which appear clean  There is an approx 6x6 cm necrotic eschar of the right proximal thigh , with an open portion, draining foul smelling fluid, possibly a liquified hematoma  I can not appreciate SQ emphysema  Given poor patient tolerance, expeditious packing of wounds of right lateral proximal thigh ,with saline soaked gauze,  done  Bedside debridement is not feasible   called 's cell and message left  Findings d/w ED attending  patient has been evaluated by Surgery    Consider Steroid bolus needed,  given chronic therapy with 50mg Prednisone      BP: 110/46 (12 Nov 2018 08:46) (84/37 - 147/82)  BP(mean): --  RR: 18 (12 Nov 2018 07:24) (16 - 18)  SpO2: 98% (12 Nov 2018 07:24) (92% - 99%)    LABS:                        9.6    17.5  )-----------( 264      ( 12 Nov 2018 06:07 )             31.0     PT/INR - ( 12 Nov 2018 06:09 )   PT: 30.6 sec;   INR: 2.61 ratio         PTT - ( 12 Nov 2018 06:09 )  PTT:35.1 sec      Outpatient follow up information provided- [phone]

## 2018-11-12 NOTE — ED PROVIDER NOTE - OBJECTIVE STATEMENT
Omid HACKETT MD PGY1: 78F with PMH of CAD s/p HERBERT to LAD, severe AS s/p TAVR, bradycardia s/p PPM, MVR, DVT /PE on coumadin, BLE ulcers with wound healing problems due to suspected pyoderma gangrenosum BIBEMS for eval of RLE pain and WBC count to 15. Pt has been recently admitted for SIRS dispo to [facility] 10/29 on supressive doxycyline for MRSA + BLE ulcers. Patient in great distress 2/2 pain. No fever.

## 2018-11-12 NOTE — H&P ADULT - NSHPLABSRESULTS_GEN_ALL_CORE
T(C): 37.6 (11-12-18 @ 16:18), Max: 39.1 (11-12-18 @ 05:35)  HR: 106 (11-12-18 @ 18:37) (80 - 106)  BP: 106/64 (11-12-18 @ 18:37) (84/37 - 147/82)  RR: 20 (11-12-18 @ 16:18) (16 - 20)  SpO2: 95% (11-12-18 @ 16:18) (92% - 100%)    LABS:                        9.6    17.5  )-----------( 264      ( 12 Nov 2018 06:07 )             31.0     12 Nov 2018 06:07    146    |  105    |  25     ----------------------------<  89     5.0     |  31     |  0.77     Ca    8.7        12 Nov 2018 06:07    TPro  5.9    /  Alb  2.7    /  TBili  0.3    /  DBili  x      /  AST  12     /  ALT  14     /  AlkPhos  72     12 Nov 2018 06:07    PT/INR - ( 12 Nov 2018 06:09 )   PT: 30.6 sec;   INR: 2.61 ratio         PTT - ( 12 Nov 2018 06:09 )  PTT:35.1 sec      CT Abdomen and Pelvis w/ IV Cont (11.12.18 @ 09:31)    ABDOMINAL WALL: Limited evaluation along the right lateral subcutaneous   soft tissues secondary to artifact from the gantry. There is partially   imaged subcutaneous edema in this region (series 601 image 76). There is   diastases of the abdominal wall musculature containing nonobstructed   bowel. There is mild wall edema in the subcutaneous soft tissues in the   anterior abdominal wall in inferiorly alongthe anterior size without   discrete fluid collection or subcutaneous emphysema.  BONES: Degenerative changes of the spine.    IMPRESSION:     Limited evaluation of the right-sided subcutaneous soft tissues secondary   to artifact from the gantry. Partially imaged subcutaneous edema in this   region. No subcutaneous emphysema is visualized.    Persistent wall thickening and dilatation of the appendiceal tip for   which differential includes chronic inflammation versus focal lesion.    Small bilateral pleural effusions and bibasilar atelectasis.

## 2018-11-12 NOTE — CONSULT NOTE ADULT - SUBJECTIVE AND OBJECTIVE BOX
HPI:  77 year old woman with PMH of CAD s/p stent to LAD, AS s/p TAVR, PPM, DVT (Dx ) on coumadin, pyoderma gangrenosum, and total knee replacement c/b joint infections s/p I&D explantation, static spacer placement and complex wound closure by plastic surgery presents with fevers and malodorous right lateral hip wound. She was brought from [facility]. She complained of pain in her legs. On presentation she was febrile to 39.1, hypotensive (systolic 80s), WBC count 17, INR 2.6, and lactate 2.0. (2018 18:26). Current admission prompted by fever, and right thigh  wound drainage.  at bedside with patient. Long time aide, Lissa  has been replaced by Chau, currently present. Labile BP responded to fluid administration  Plastics consulted for wound care of right medial thigh and right knee.       PAST MEDICAL HISTORY:  CAD (coronary artery disease)  Aortic stenosis, severe  Uncomplicated asthma, unspecified asthma severity  Polymyalgia  Lumbar disc disease with radiculopathy  Spider veins  Anxiety  Severe aortic stenosis by prior echocardiogram  Dysphagia  Morbid obesity with BMI of 50.0-59.9, adult  Macular degeneration  Hiatal hernia  GERD (gastroesophageal reflux disease)  Sciatica  Osteoarthritis  HTN (hypertension)      PAST SURGICAL HISTORY:  S/P TKR (total knee replacement)  S/P TAVR (transcatheter aortic valve replacement)  Artificial cardiac pacemaker  H/O cardiac catheterization  H/O endoscopy  S/P cataract surgery  S/P gastroplasty  S/P cholecystectomy  S/P total knee replacement  S/P total knee replacement  S/P hysterectomy      MEDICATIONS:  acetaminophen  IVPB .. 1000 milliGRAM(s) IV Intermittent once  HYDROmorphone  Injectable 0.5 milliGRAM(s) IV Push every 4 hours PRN  lactated ringers. 1000 milliLiter(s) IV Continuous <Continuous>  meropenem  IVPB      meropenem  IVPB 2000 milliGRAM(s) IV Intermittent every 8 hours  vancomycin  IVPB      vancomycin  IVPB 1500 milliGRAM(s) IV Intermittent every 12 hours      ALLERGIES:  amoxicillin (Other)  IV Contrast (Flushing (Skin); Nausea)      VITALS & I/Os:  Vital Signs Last 24 Hrs  T(C): 37.6 (2018 16:18), Max: 39.1 (2018 05:35)  T(F): 99.6 (2018 16:18), Max: 102.3 (2018 05:35)  HR: 106 (2018 18:37) (80 - 106)  BP: 106/64 (2018 18:37) (84/37 - 147/82)  BP(mean): --  RR: 20 (2018 16:18) (16 - 20)  SpO2: 95% (2018 16:18) (92% - 100%)    I&O's Summary    2018 07:01  -  2018 19:10  --------------------------------------------------------  IN: 100 mL / OUT: 0 mL / NET: 100 mL        PHYSICAL EXAM:  General: No acute distress, lying in bed  Respiratory: Nonlabored  R knee wound clean, granulation tissue, some serous drainage - similar to prior admission  R media thigh - dressing w/ serous drainage, clean, granulation tissue - similar to prior admission    LABS:                        9.6    17.5  )-----------( 264      ( 2018 06:07 )             31.0     11-    146<H>  |  105  |  25<H>  ----------------------------<  89  5.0   |  31  |  0.77    Ca    8.7      2018 06:07    TPro  5.9<L>  /  Alb  2.7<L>  /  TBili  0.3  /  DBili  x   /  AST  12  /  ALT  14  /  AlkPhos  72  -12    Lactate:  11-12 @ 06:07  2.0    PT/INR - ( 2018 06:09 )   PT: 30.6 sec;   INR: 2.61 ratio         PTT - ( 2018 06:09 )  PTT:35.1 sec          Urinalysis Basic - ( 2018 14:51 )    Color: Light Yellow / Appearance: Clear / S.039 / pH: x  Gluc: x / Ketone: Negative  / Bili: Negative / Urobili: Negative   Blood: x / Protein: Trace / Nitrite: Negative   Leuk Esterase: Large / RBC: 5 /hpf / WBC 28 /hpf   Sq Epi: x / Non Sq Epi: 2 /hpf / Bacteria: Negative        IMAGING:

## 2018-11-12 NOTE — ED PROVIDER NOTE - MEDICAL DECISION MAKING DETAILS
Omid HACKETT MD PGY1: 78F recently admitted with chronic BLE ulcers with one prominent large ulcer on R buttock concerning for necrotizing fasciitis. Will c/s surgery, fluid resuscitate with 3L crystalloid and consider pressors if persistently hypotensive.

## 2018-11-12 NOTE — CONSULT NOTE ADULT - ASSESSMENT
76 yo F w/ pmh HTN, CAD with PPM, and multiple chronic wounds and resolving pyoderma gangrenosum, presents with fever, hypotension, and draining right lateral thigh and buttock wound. Plastics consulted for right knee and right medial thigh wounds.     - continue dressing changes - aquacel on R knee and medial thigh  - f/u wound care  - consider derm consult (done on previous admission)  - per general surgery - added on for OR for lateral thigh wound  - care per primary team    Plastic Surgery [phone]

## 2018-11-12 NOTE — H&P ADULT - NSHPPHYSICALEXAM_GEN_ALL_CORE
General: Alert, NAD  Neuro: A+Ox3  HEENT: NC/AT, no asymmetry, no scleral icterus  Neck: Soft, supple  Cardio: RRR  Resp: Airway patent, unlabored breathing  GI/Abd: Soft, nontender, nondistended  Ext: Warm, well perfused, multiple wounds with fibrinous exudate on bilateral thighs, large necrotic foul smelling wound on right posterior thigh with dark brown drainage and surrounding erythema, no crepitus

## 2018-11-12 NOTE — ED ADULT NURSE REASSESSMENT NOTE - NS ED NURSE REASSESS COMMENT FT1
wound care just finished at bedside. pt. tearful r/t to pain and reports pain 10/10. MD made aware. pt. to be given pain medication

## 2018-11-12 NOTE — ED ADULT NURSE REASSESSMENT NOTE - NS ED NURSE REASSESS COMMENT FT1
upon going to straight cath pt. as per MD's orders, RN and tech at bedside, sterile technique used and pt. cleaned using betadine. pt. then reports she had to urinate and urinated into cup. sterile sample collected and sent off to lab

## 2018-11-12 NOTE — PROGRESS NOTE ADULT - ASSESSMENT
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 requiring I&D, TKA explant, static spacer placement with complex wound closure by plastic surgery complicated by wound healing problems and development of pyoderma granulosum    wounds are managed by PRS/wound care  recommend endocrine, dermatology, rheumatology consults for management of pmr, pg and proper steroid dosing  abx per ID, lifelong minocycline for chronic suppression, apprecaite ID input  appreciate gen surg input, unlikely recurrence appendix, pending u/s abd fluid collection  continue to spend majority of day oob to wheelchair, okay to ambulate but must remain 50% weightbearing on RLE with NO KNEE MOTION allowed, knee immobilizer at all times if OOB  dvt ppx per primary, agree w xarelto plan  Nutrition consult recommended, optimize nutrition for continued wound healing  Minimize narcotics for this patient, she tends to become oversedated  Recommend dc to subacute rehab as opposed to home after this hospitalization, her  and patient lack the necessary support system to take care of her at home and she did not follow up with any of her doctors after her prolonged hospitalization 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 requiring I&D, TKA explant, static spacer placement with complex wound closure by plastic surgery complicated by wound healing problems and development of pyoderma granulosum  now returned with leukocytosis and fevers with new dependent R thigh ulceration    appreciate gen surg care for possibly septic ulceration R post thigh  wounds are managed by PRS/wound care  recommend endocrine, dermatology, rheumatology consults for management of pmr, pg and proper steroid dosing, critical in this patient as this is the only medication that has helped her to heal remainder of wounds  abx per ID, lifelong doxycycline for chronic suppression, please involve ID input, needs to stay on medication  continue to spend majority of day oob to wheelchair, okay to ambulate but must remain 50% weightbearing on RLE with NO KNEE MOTION allowed, knee immobilizer at all times if OOB  dvt ppx per primary, as discussed at last admission there was never a PE, but had a provoked DVT approximately 10-11 months ago, recommend avoidance of coumadin as patient diet inconsistent, but defer to primary  Nutrition consult recommended, optimize nutrition for continued wound healing  Minimize narcotics for this patient, she tends to become oversedated  Recommend dc to subacute rehab as opposed to home after this hospitalization, her  and patient lack the necessary support system to take care of her at home and she did not follow up with any of her doctors after her prolonged hospitalization 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 requiring I&D, TKA explant, static spacer placement with complex wound closure by plastic surgery complicated by wound healing problems and development of pyoderma granulosum  now returned with leukocytosis and fevers with new dependent R thigh posterolateral ulceration    appreciate gen surg care for possibly septic ulceration R post thigh, agree with their plan for OR for debridement given patient's poor ability to tolerate bedside debridement, family still resisting consent however may consent after derm evaluation tomorrow if derm agrees with plan per their statements  remainder thigh/abd/knee wounds are managed by PRS/wound care  recommend endocrine, dermatology, rheumatology consults for management of pmr, pg and proper steroid dosing, critical in this patient as this is the only medication that has helped her to heal remainder of wounds  abx per ID, lifelong doxycycline for chronic suppression, please involve ID input, needs to stay on medication  continue to spend majority of day oob to wheelchair, okay to ambulate but must remain 50% weightbearing on RLE with NO KNEE MOTION allowed, knee immobilizer at all times if OOB  dvt ppx per primary, as discussed at last admission there was never a PE, but had a provoked DVT approximately 10-11 months ago, recommend avoidance of coumadin as patient diet inconsistent, but defer to primary  Nutrition consult recommended, optimize nutrition for continued wound healing  Minimize narcotics for this patient, she tends to become oversedated  Recommend dc to subacute rehab as opposed to home after this hospitalization, her  and patient lack the necessary support system to take care of her at home and she did not follow up with any of her doctors after her prolonged hospitalization  r knee was recently imaged at last visit and unchanged from prior imaging

## 2018-11-12 NOTE — H&P ADULT - HISTORY OF PRESENT ILLNESS
77 year old woman with PMH of CAD s/p stent to LAD, AS s/p TAVR, PPM, DVT (Dx 1/18) on coumadin, pyoderma gangrenosum, and total knee replacement c/b joint infections s/p I&D explantation, static spacer placement and complex wound closure by plastic surgery presents with fevers and malodorous right lateral hip wound. She was brought from [facility]. She complained of pain in her legs. On presentation she was febrile to 39.1, hypotensive (systolic 80s), WBC count 17, INR 2.6, and lactate 2.0. 77 year old woman with PMH of CAD s/p stent to LAD, AS s/p TAVR, PPM, DVT (Jan '18) on coumadin, IVC filter, pyoderma gangrenosum, and total knee replacement c/b joint infections s/p I&D explantation, static spacer placement and complex wound closure by plastic surgery presents with fevers and malodorous right lateral hip wound. She was brought from [facility]. She complained of pain in her legs. On presentation she was febrile to 39.1, hypotensive (systolic 80s), WBC count 17, INR 2.6, and lactate 2.0. Of note, it was documented in Oct 2018 that patient has completed course of anticoagulation for provoked DVT.

## 2018-11-12 NOTE — ED PROVIDER NOTE - ATTENDING CONTRIBUTION TO CARE
78 yof pmhx cad w xu to lad, severe as s/p tavr, zoe s/p ppm, dvt pe on coumadin, prior total knee replacement complicated by mult joint infections s/p mult i&D w static spacer placment and complex wound closure by plastics, known MRSA, suspected pyoderma gangrenosum on last admission seen by surg and deemed non operative, recent infection for mult wounds known to be mrsa positive to bilat inguinal/ prox thighs/ bilat buttocks. was recently discharged to [facility] on chronic immunosuppresive abx incl doxy po. returns today from [facility]hab w rising wbc count over last few days and fever to 101 at [facility]. pt has 24 hr aide at bedside who states had fever overnight. pt does not have any current complaints and states she did not want to come to the ED.     ROS:   constitutional - + fever, no chills  eyes - no visual changes, no redness  eent - no sore throat, no nasal congestion  cvs - no chest pain, no leg swelling  resp - no shortness of breath, no cough  gi - no abdominal pain, no vomiting, no diarrhea  gu - no dysuria, no hematuria  msk - no acute back pain, no joint swelling  skin - no rashes, no jaundice  neuro - no headache, no focal weakness  psych - no acute mental health issue     Physical Exam:   constitutional - well appearing, awake and alert, oriented x3  head - no external evidence of trauma  cvs - rrr, no murmurs, no peripheral edema  resp - breath sounds clear and equal bilat  gi - abdomen soft and nontender, no rigidity, guarding or rebound, bowel sounds present  msk - moving all extremities spontaneously  neuro - alert and oriented x3, no focal deficits, CNs 2-12 grossly intact  skin- right knee w chronic appearing granulation tissue after mult surgeries/ complex wound closures. bilat medial proximal thighs w poorly healing wounds w packing in place. right buttock large approx 10x10 cm area of necrotic appearing tissue actively bleeding dark brownish blood from wound . surrounding erythema and crepitus to wound of right buttock   psych - + anxiety, crying , tearful stating "don't touch me" and "I don't want to move" and "leave me alone"    chronic poorly healing wounds to buttock/ thighs w crepitus to right buttock wound in setting of fever to 102 and sepsis / hypotension w bp 80/30 on arrival. ? nec fasc vs poss fistula though less likely as there is signif distance between rectum and pt's right buttock in morbidly obese pt. called gen surg on arrival as well as plastics as she is knkown to plastics and wound care.   bp improved w 3L NS though suspect severe sepsis and may likely need SICU  discussed choice of imaging w gen surg - will do ct w iv contrast at this time.   endorsed to Dr Morrell at 0715 pending final surg recs and will likely need surgical debridement and poss SICU. JULIO CESAR Farooq MD 78 yof pmhx cad w xu to lad, severe as s/p tavr, zoe s/p ppm, dvt pe on coumadin, prior total knee replacement complicated by mult joint infections s/p mult i&D w static spacer placment and complex wound closure by plastics, known MRSA, suspected pyoderma gangrenosum on last admission seen by surg and deemed non operative, recent infection for mult wounds known to be mrsa positive to bilat inguinal/ prox thighs/ bilat buttocks. was recently discharged to [facility] on chronic immunosuppresive abx incl doxy po. returns today from [facility] [place] [facility] w rising wbc count over last few days and fever to 101 at [facility]. pt has 24 hr aide at bedside who states had fever overnight. pt does not have any current complaints and states she did not want to come to the ED. is bedbound.     ROS:   constitutional - + fever, no chills  eyes - no visual changes, no redness  eent - no sore throat, no nasal congestion  cvs - no chest pain, no leg swelling  resp - no shortness of breath, no cough  gi - no abdominal pain, no vomiting, no diarrhea  gu - no dysuria, no hematuria  msk - no acute back pain, no joint swelling  skin - no rashes, no jaundice  neuro - no headache, no focal weakness  psych - no acute mental health issue     Physical Exam:   constitutional - well appearing, awake and alert, oriented x3  head - no external evidence of trauma  cvs - rrr, no murmurs, no peripheral edema  resp - breath sounds clear and equal bilat  gi - abdomen soft and nontender, no rigidity, guarding or rebound, bowel sounds present  msk - moving all extremities spontaneously  neuro - alert and oriented x3, no focal deficits, CNs 2-12 grossly intact  skin- right knee w chronic appearing granulation tissue after mult surgeries/ complex wound closures. bilat medial proximal thighs w poorly healing wounds w packing in place. right buttock large approx 10x10 cm area of necrotic appearing tissue actively bleeding dark brownish blood from wound . surrounding erythema and crepitus to wound of right buttock   psych - + anxiety, crying , tearful stating "don't touch me" and "I don't want to move" and "leave me alone"    -chronic poorly healing wounds to buttock/ thighs w crepitus to right buttock wound in setting of fever to 102 and sepsis / hypotension w bp 80/30 on arrival. ? nec fasc vs poss fistula though less likely as there is signif distance between rectum and pt's right buttock in morbidly obese pt. called gen surg on arrival as well as plastics as she is knkown to plastics and wound care.   -bp improved w 3L NS though suspect severe sepsis and may likely need SICU  -discussed choice of imaging w gen surg - will do ct w iv contrast at this time, forgoing po contrast.   -discussed code status w pt - states she would like to 'think about it further' before making any decisions. remains full code at this time. mult prolonged hospitalizations now bed bound at [facility].   - pt responded well to 3L NS   -endorsed to Dr Morrell at 0715 pending final surg recs and will likely need surgical debridement and poss SICU vs MICU .   JULIO CESAR Farooq MD

## 2018-11-12 NOTE — ED ADULT NURSE NOTE - OBJECTIVE STATEMENT
79 y/o female a+ox3, CAD, aortic stenosis, asthma, dysphagia, 79 y/o female a+ox3, CAD, aortic stenosis, asthma, dysphagia, coming via EMS for elevated temp. Pt transferred from [facility] a progressively increasing WBC count over past few days, came to ED tonight for elevated temp. Assessment found 4 major unstageable wounds to lower extremities bilaterally; all were packed with green tinged gauze; wound to right hip open and active bleeding at this time; MD at bedside. Pt recently admitted and treated at [facility] for similar complaints. Pt also noted to have chronic venous stasis to legs bilaterally. Rectal temp 102.3F, BP in 80's systolic, room air spo2 92%. IVs established bilaterally, labs obtained. Pt on CM with spo2, oxygen provided via nc at 3L. Pt left in position of comfort, guard rails up, bed low and locked. Will reassess

## 2018-11-12 NOTE — ED PROVIDER NOTE - CONDUCTED A DETAILED DISCUSSION WITH PATIENT AND/OR GUARDIAN REGARDING, MDM
DNR/lab results/pt states wants to think about code status further before making decision/need to admit/need for surgery

## 2018-11-12 NOTE — H&P ADULT - ASSESSMENT
77 year old woman with PMH of CAD s/p stent to LAD, AS s/p TAVR, PPM, DVT (Dx 1/18) on coumadin, pyoderma gangrenosum, and total knee replacement c/b joint infections s/p I&D explantation, static spacer placement and complex wound closure by plastic surgery, now admitted with necrotic right posterior thigh wound.    - Admit to surgery under Dr. Ledbetter  - NPO, IV fluids, antibiotics  - Patient added on and consented ( gives consent) for debridement in OR  - Dermatology consult for pyoderma gangrenosum

## 2018-11-12 NOTE — ED PROVIDER NOTE - CRITICAL CARE PROVIDED
direct patient care (not related to procedure)/additional history taking/conducted a detailed discussion of DNR status/interpretation of diagnostic studies/documentation/consult w/ pt's family directly relating to pts condition/consultation with other physicians

## 2018-11-12 NOTE — CHART NOTE - NSCHARTNOTEFT_GEN_A_CORE
Patient discussed with Dermatology attending --- remotely. Recommendations were verbally communicated to primary team.     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 It is possible that retiform purpura around ulcer are new onset in the setting of DIC 2/2 sepsis or that they are the primary coagulopathy causing ulcers. Recommend DIC w/o, however in the setting of normal APTT and platelets, less likely. Recommend sending D-dimer and fibrinogen.  Can consider vasculopathy w/o however given patient is currently on warfarin for hx of PE/DVT, patient is likely receiving adequate treatment for vasculopathy and likely has prior w/o results-preliminary w/o would include B2 microglobulin, C3, C4, cryoglobulins, anti-phospholipid antibody, anti-cardiolipin, protein C, S, antithrombin III, factor V Leiden and homocysteine.      Continue with empiric treatment for presumed sepsis. If most likely source is right hip, will defer to surgery to determine appropriateness of debridement to treat infection. If patient does receive debridement, recommend one biopsy specimen with full thickness (epidermis to subcutaneous fat) along wound edge be sent to dermatopathology for review.    Dermatology consult resident will see patient tomorrow and staff the patient Wednesday. Please call [phone] with any questions.

## 2018-11-12 NOTE — ED PROVIDER NOTE - PHYSICAL EXAMINATION
Omid HACKETT MD PGY1:   PHYSICAL EXAM:    GENERAL: Obese, uncomfortable.   HEENT:  Atraumatic, Normocephalic  CHEST/LUNG: Chest rise equal bilaterally. CTAB.   HEART: Regular rate and rhythm. No murmurs or rubs.   ABDOMEN: Soft, Nontender, Nondistended.  EXTREMITIES:  Unable to palpate lower extremity or upper extremity pulses.   PSYCH: A&Ox3  SKIN: Multiple BLE ulcers. Large ulcer on R buttock continuously leaking brown sanguinous fluid. Surrounding erythema and crepitus.

## 2018-11-12 NOTE — ED ADULT NURSE REASSESSMENT NOTE - NS ED NURSE REASSESS COMMENT FT1
report taken from JAS Hinton. Pt. A&Ox4. Pt. here for multiple infected pressure wounds. Pt. has 4 unstageable wounds to the R. upper leg - 3 are packed, 1 actively bleeding. As per MD Farooq, bleeding ulcer to be left alone at this time as pt. will probably be going to OR. Pt. also has 1 unstageable wound (packed) to upper L. leg. Pt. also has Stage II ulcers in the groin area and contact dermatitis. Pt. also has chronic venous statis noted to the BLEs. Pt. currently afebrile. Pt. safely transferred to hospital bed for comfort w/ multiple staff (due to obesity). Safety and comfort provided. Side rails up for safety. Bed in lowest position. Call bell within reach.  and aid at bedside. Surgery also at bedside examining pt. Pt. awaiting dispo. report taken from JAS Hinton. Pt. A&Ox4. Pt. here for multiple infected pressure wounds. Pt. has 4 unstageable wounds to the R. upper leg - 3 are packed, 1 actively bleeding. As per MD Farooq, bleeding ulcer to be left alone at this time as pt. will probably be going to OR. Pt. also has 1 unstageable wound (packed) to upper L. leg. Wounds are malodorous. Pt. also has Stage II ulcers in the groin area and contact dermatitis. Pt. also has chronic venous statis noted to the BLEs. Pt. currently afebrile. Pt. safely transferred to hospital bed for comfort w/ multiple staff (due to obesity). Safety and comfort provided. Side rails up for safety. Bed in lowest position. Call bell within reach.  and aid at bedside. Surgery also at bedside examining pt. Pt. awaiting dispo. report taken from JAS Hinton. Pt. A&Ox4. Pt. here for multiple infected pressure wounds. Pt. has 4 unstageable wounds to the R. upper leg - 3 are packed, 1 actively bleeding. As per MD Farooq, bleeding ulcer to be left alone at this time as pt. will probably be going to OR. Pt. also has 1 unstageable wound (packed) to upper L. leg. Wounds are malodorous. Pt. also has Stage II ulcers in the groin area and contact dermatitis. Pt. also has chronic venous statis noted to the BLEs. Pt. currently afebrile. No dizziness/lightheadedness. Pt. safely transferred to hospital bed for comfort w/ multiple staff (due to obesity). Safety and comfort provided. Side rails up for safety. Bed in lowest position. Call bell within reach.  and aid at bedside. Surgery also at bedside examining pt. Pt. awaiting dispo. report taken from JAS Hinton. Pt. A&Ox4. Pt. here for multiple infected pressure wounds. Pt. has 4 unstageable wounds to the R. upper leg - 3 are packed, 1 actively bleeding. As per MD Farooq, bleeding ulcer to be left alone at this time as pt. will probably be going to OR. Pt. also has 1 unstageable wound (packed) to upper L. leg. Wounds are malodorous. Pt. also has Stage II ulcers in the groin area and contact dermatitis. Pt. has Stage II to the RLE. Pt. also has chronic venous statis noted to the BLEs. Pt. also has ecchymosis bilaterally to upper extremities. Pt. currently afebrile. No dizziness/lightheadedness. Pt. safely transferred to hospital bed for comfort w/ multiple staff (due to obesity). Safety and comfort provided. Side rails up for safety. Bed in lowest position. Call bell within reach.  and aid at bedside. Surgery also at bedside examining pt. Pt. awaiting dispo. report taken from JAS Hinton. Pt. A&Ox4. Pt. here for multiple infected pressure wounds. Pt. has 4 unstageable wounds to the R. upper leg - 3 are packed, 1 actively bleeding. As per MD Farooq, bleeding ulcer to be left alone at this time as pt. will probably be going to OR. Pt. also has 1 unstageable wound (packed) to upper L. leg. Wound noted to L. inner thigh. Wounds are malodorous. Pt. also has Stage II ulcers in the groin area and contact dermatitis. Pt. has Stage II to the RLE. Pt. also has chronic venous statis noted to the BLEs. Pt. also has ecchymosis bilaterally to upper extremities. Pt. currently afebrile. No dizziness/lightheadedness. Pt. safely transferred to hospital bed for comfort w/ multiple staff (due to obesity). Safety and comfort provided. Side rails up for safety. Bed in lowest position. Call bell within reach.  and aid at bedside. Surgery also at bedside examining pt. Pt. awaiting dispo.

## 2018-11-12 NOTE — H&P ADULT - ATTENDING COMMENTS
Pt seen and examined. Agree with A/P. Discussed plan with , daughter and son, pt unable to provide consent. Family does not wish to proceed with surgery at this time, will discuss further and give a decision tomorrow morning. Admit to [facility], floor. Wound care, abx, home meds, hold coumadin.

## 2018-11-12 NOTE — PROGRESS NOTE ADULT - SUBJECTIVE AND OBJECTIVE BOX
doing well today    afvss  nad  abdominal wound - significantly healed, care per wound care  RLE  thigh - significant healing, dressing per wound care teams  knee wound now with dry dressing, epithelializing, almost completely covered no drainage  remainder of wounds dressed by wound care  5/5 ta/ehl/gcs  silt l4-s1  2+ dp pulse     ros: depressed, addressed by behavioral health returned to ED/hospital with fevers/elevated WBC and new finding of R posterior thigh/buttock ulceration from SNF  Admitted to gen surg with concern for septic process with plan for possible OR I&D but family prefers to delay for now for futher work up  I spent >60 minutes speaking with patients daughter, patient and HHA and they discussed that they think the posterior thigh ulcer started either at home during her short stay at home versus recent SNF stay but significantly worsened in past several days. Recently patient was doing well at snf per family report, out of bed daily and even going outside. This is acute change in status per their report.    febrile 101  nad  abdominal wound - healed  RLE  posterior thigh - new wound, large, unable to determine size  lateral/anterior thigh - mostly healed  knee wound mostly healed, epithelializing, no drainage  remainder of wounds dressed by wound care  5/5 ta/ehl/gcs  silt l4-s1  2+ dp pulse     ros: depressed, addressed by behavioral health returned to ED/hospital with fevers/elevated WBC and new finding of R posterior thigh/buttock ulceration from SNF  Admitted to gen surg with concern for septic process with plan for possible OR I&D but family prefers to delay for now for futher work up. I spent >60 minutes speaking with patients daughter, patient and HHA and they described that they think the posterior thigh ulcer started either at home during her short stay at home since she was immobile at home versus recent SNF stay but significantly worsened in past several days. Recently patient was doing well at snf per family report, out of bed daily and even going outside. This is acute change in status per their report.     febrile 101  nad  abdominal wound - healed  RLE  posterior thigh - new wound, patient refused exam but shown image of wound by primary team, large ulceration, unable to determine size  lateral/anterior thigh - mostly healed  knee wound mostly healed, epithelializing, no drainage  remainder of wounds dressed by wound care  5/5 ta/ehl/gcs  silt l4-s1  2+ dp pulse     ros: depressed, addressed by behavioral health

## 2018-11-13 ENCOUNTER — RESULT REVIEW (OUTPATIENT)
Age: 78
End: 2018-11-13

## 2018-11-13 LAB
-  COAGULASE NEGATIVE STAPHYLOCOCCUS: SIGNIFICANT CHANGE UP
ANION GAP SERPL CALC-SCNC: 13 MMOL/L — SIGNIFICANT CHANGE UP (ref 5–17)
APTT BLD: 28.2 SEC — SIGNIFICANT CHANGE UP (ref 27.5–36.3)
BUN SERPL-MCNC: 15 MG/DL — SIGNIFICANT CHANGE UP (ref 7–23)
CALCIUM SERPL-MCNC: 8 MG/DL — LOW (ref 8.4–10.5)
CHLORIDE SERPL-SCNC: 101 MMOL/L — SIGNIFICANT CHANGE UP (ref 96–108)
CO2 SERPL-SCNC: 24 MMOL/L — SIGNIFICANT CHANGE UP (ref 22–31)
CREAT SERPL-MCNC: 0.61 MG/DL — SIGNIFICANT CHANGE UP (ref 0.5–1.3)
CRP SERPL-MCNC: 14.74 MG/DL — HIGH (ref 0–0.4)
CULTURE RESULTS: SIGNIFICANT CHANGE UP
GLUCOSE SERPL-MCNC: 63 MG/DL — LOW (ref 70–99)
GRAM STN FLD: SIGNIFICANT CHANGE UP
INR BLD: 2.17 RATIO — HIGH (ref 0.88–1.16)
MAGNESIUM SERPL-MCNC: 1.6 MG/DL — SIGNIFICANT CHANGE UP (ref 1.6–2.6)
METHOD TYPE: SIGNIFICANT CHANGE UP
PHOSPHATE SERPL-MCNC: 3.9 MG/DL — SIGNIFICANT CHANGE UP (ref 2.5–4.5)
POTASSIUM SERPL-MCNC: 4.3 MMOL/L — SIGNIFICANT CHANGE UP (ref 3.5–5.3)
POTASSIUM SERPL-SCNC: 4.3 MMOL/L — SIGNIFICANT CHANGE UP (ref 3.5–5.3)
PROTHROM AB SERPL-ACNC: 25.3 SEC — HIGH (ref 10–12.9)
SODIUM SERPL-SCNC: 138 MMOL/L — SIGNIFICANT CHANGE UP (ref 135–145)
SPECIMEN SOURCE: SIGNIFICANT CHANGE UP
VANCOMYCIN TROUGH SERPL-MCNC: 18.1 UG/ML — SIGNIFICANT CHANGE UP (ref 10–20)

## 2018-11-13 PROCEDURE — 99233 SBSQ HOSP IP/OBS HIGH 50: CPT

## 2018-11-13 PROCEDURE — 99232 SBSQ HOSP IP/OBS MODERATE 35: CPT

## 2018-11-13 PROCEDURE — 88305 TISSUE EXAM BY PATHOLOGIST: CPT | Mod: 26

## 2018-11-13 PROCEDURE — 88312 SPECIAL STAINS GROUP 1: CPT | Mod: 26

## 2018-11-13 RX ORDER — METOPROLOL TARTRATE 50 MG
12.5 TABLET ORAL
Qty: 0 | Refills: 0 | Status: DISCONTINUED | OUTPATIENT
Start: 2018-11-13 | End: 2018-11-13

## 2018-11-13 RX ORDER — POLYETHYLENE GLYCOL 3350 17 G/17G
17 POWDER, FOR SOLUTION ORAL DAILY
Qty: 0 | Refills: 0 | Status: DISCONTINUED | OUTPATIENT
Start: 2018-11-13 | End: 2018-11-22

## 2018-11-13 RX ORDER — ACETAMINOPHEN 500 MG
1000 TABLET ORAL ONCE
Qty: 0 | Refills: 0 | Status: COMPLETED | OUTPATIENT
Start: 2018-11-13 | End: 2018-11-15

## 2018-11-13 RX ORDER — SODIUM HYPOCHLORITE 0.125 %
1 SOLUTION, NON-ORAL MISCELLANEOUS
Qty: 0 | Refills: 0 | Status: DISCONTINUED | OUTPATIENT
Start: 2018-11-13 | End: 2018-11-22

## 2018-11-13 RX ORDER — ACETAMINOPHEN 500 MG
1000 TABLET ORAL ONCE
Qty: 0 | Refills: 0 | Status: COMPLETED | OUTPATIENT
Start: 2018-11-13 | End: 2018-11-13

## 2018-11-13 RX ORDER — ASPIRIN/CALCIUM CARB/MAGNESIUM 324 MG
81 TABLET ORAL DAILY
Qty: 0 | Refills: 0 | Status: DISCONTINUED | OUTPATIENT
Start: 2018-11-13 | End: 2018-11-22

## 2018-11-13 RX ORDER — LANOLIN ALCOHOL/MO/W.PET/CERES
3 CREAM (GRAM) TOPICAL AT BEDTIME
Qty: 0 | Refills: 0 | Status: DISCONTINUED | OUTPATIENT
Start: 2018-11-13 | End: 2018-11-18

## 2018-11-13 RX ORDER — METOPROLOL TARTRATE 50 MG
12.5 TABLET ORAL
Qty: 0 | Refills: 0 | Status: DISCONTINUED | OUTPATIENT
Start: 2018-11-13 | End: 2018-11-22

## 2018-11-13 RX ORDER — HEPARIN SODIUM 5000 [USP'U]/ML
5000 INJECTION INTRAVENOUS; SUBCUTANEOUS EVERY 8 HOURS
Qty: 0 | Refills: 0 | Status: DISCONTINUED | OUTPATIENT
Start: 2018-11-13 | End: 2018-11-21

## 2018-11-13 RX ADMIN — Medication 300 MILLIGRAM(S): at 23:58

## 2018-11-13 RX ADMIN — HEPARIN SODIUM 5000 UNIT(S): 5000 INJECTION INTRAVENOUS; SUBCUTANEOUS at 22:57

## 2018-11-13 RX ADMIN — HYDROMORPHONE HYDROCHLORIDE 0.5 MILLIGRAM(S): 2 INJECTION INTRAMUSCULAR; INTRAVENOUS; SUBCUTANEOUS at 22:55

## 2018-11-13 RX ADMIN — HYDROMORPHONE HYDROCHLORIDE 0.5 MILLIGRAM(S): 2 INJECTION INTRAMUSCULAR; INTRAVENOUS; SUBCUTANEOUS at 00:42

## 2018-11-13 RX ADMIN — HYDROMORPHONE HYDROCHLORIDE 0.5 MILLIGRAM(S): 2 INJECTION INTRAMUSCULAR; INTRAVENOUS; SUBCUTANEOUS at 00:12

## 2018-11-13 RX ADMIN — SIMVASTATIN 20 MILLIGRAM(S): 20 TABLET, FILM COATED ORAL at 22:57

## 2018-11-13 RX ADMIN — MEROPENEM 200 MILLIGRAM(S): 1 INJECTION INTRAVENOUS at 09:52

## 2018-11-13 RX ADMIN — GABAPENTIN 300 MILLIGRAM(S): 400 CAPSULE ORAL at 22:57

## 2018-11-13 RX ADMIN — Medication 400 MILLIGRAM(S): at 16:27

## 2018-11-13 RX ADMIN — HYDROMORPHONE HYDROCHLORIDE 0.5 MILLIGRAM(S): 2 INJECTION INTRAMUSCULAR; INTRAVENOUS; SUBCUTANEOUS at 22:25

## 2018-11-13 RX ADMIN — MEROPENEM 200 MILLIGRAM(S): 1 INJECTION INTRAVENOUS at 18:40

## 2018-11-13 RX ADMIN — HYDROMORPHONE HYDROCHLORIDE 0.5 MILLIGRAM(S): 2 INJECTION INTRAMUSCULAR; INTRAVENOUS; SUBCUTANEOUS at 06:14

## 2018-11-13 RX ADMIN — Medication 1000 MILLIGRAM(S): at 00:11

## 2018-11-13 RX ADMIN — BUDESONIDE AND FORMOTEROL FUMARATE DIHYDRATE 2 PUFF(S): 160; 4.5 AEROSOL RESPIRATORY (INHALATION) at 18:57

## 2018-11-13 RX ADMIN — Medication 3 MILLIGRAM(S): at 22:57

## 2018-11-13 RX ADMIN — MEROPENEM 200 MILLIGRAM(S): 1 INJECTION INTRAVENOUS at 02:30

## 2018-11-13 RX ADMIN — Medication 300 MILLIGRAM(S): at 09:24

## 2018-11-13 RX ADMIN — Medication 1 APPLICATION(S): at 22:58

## 2018-11-13 RX ADMIN — Medication 1000 MILLIGRAM(S): at 16:57

## 2018-11-13 NOTE — CONSULT NOTE ADULT - ASSESSMENT
78y female with hx CAD, AS s/p TAVR, PPM, DVT (Jan '18) on coumadin, IVC filter, pyoderma gangrenosum, and total knee replacement c/b joint infections s/p I&D explantation, spacer placement and complex wound closure by plastic surgery presents with fevers and malodorous right lateral hip wound. She was brought from [facility]. She complained of pain in her legs. On presentation she was febrile to 39.1, hypotensive (systolic 80s), WBC count 17.    She was recently hospitalzied with fever and had prior Coag neg staph bacteremias, PICC removed. She has been no on chronic doxy suppression and steroids as per derm.   Pt now with staph bacteremia again and was started on broad spectrum coverage.     PLAN: 78y female with hx CAD, AS s/p TAVR, PPM, DVT (Jan '18) on coumadin, IVC filter, pyoderma gangrenosum, and total knee replacement c/b joint infections s/p I&D explantation, spacer placement and complex wound closure by plastic surgery presents with fevers and malodorous right lateral hip wound. She was brought from [facility]. She complained of pain in her legs. On presentation she was febrile to 39.1, hypotensive (systolic 80s), WBC count 17.    She was recently hospitalzied with fever and had prior Coag neg staph bacteremias, PICC removed. She has been no on chronic doxy suppression and steroids as per derm.   Pt now with staph bacteremia again and was started on broad spectrum coverage.     PLAN:    f/u cultures, WBC trend  local wound care as per derm/surgery  cont empiric Vanco and meropenem for now in view of fever and leukocytosis  the significance  of CoNs in 1/4 bottles not significant at present, but in view of prior bacteremia and now with fever and leukocytosis, would cont present regimen until cultures finalized

## 2018-11-13 NOTE — CHART NOTE - NSCHARTNOTEFT_GEN_A_CORE
Dermatology Resident Brief Note    Patient seen at bedside and discussed with Dermatology attending Dr. Pham remotely.     Briefly, 79 y/o F well-known to the Derm service with h/o CAD s/p PCI, AS s/p TAVR, TKR c/b multiple joint infections now on chronic suppressive ABX, provoked DVT (1/2018) s/p 9-months of coumadin now off, pyoderma gangrenosum (improved on pred 50mg from July to September, on pred 50mg QD since 10/26) re-admitted for fevers and malodorous R lateral hip wound.     Exam with deep (>5cm), punched out ulcer with necrotic, purpuric borders. A large, foul-smelling blood clot was dislodged by Wound Care Team. Appearance c/w retiform purpura of unclear etiology - ddx coagulopathy vs. infection     At this time, recommend:  - 4mm punch biopsy x 3 performed of R thigh ulcer today (1 - H&E of purpura, 2 - H&E of erythematous surrounding border, 3 - Tissue culture for bacterial/fungi)  - Tissue culture also sent of infected clot dislodged from large inferior ulceration  - please send coagulopathy w/u as per last Chart Note   - would consider restarting prednisone 50mg QD given risk of adrenal insufficiency w/o tapering dose  - spoke with pt's  and daughter Betty who gave verbal consent for both bloodwork and biopsy    Dermatology consult team will officially round and staff the patient tomorrow. Consult note to follow. Please call [phone] with any questions. Dermatology Resident Brief Note    Patient evaluated at bedside with Wound Team, RN staff, and aide.     Briefly, 79 y/o F well-known to the Derm service with h/o CAD s/p PCI, AS s/p TAVR, TKR c/b multiple joint infections now on chronic suppressive ABX, provoked DVT (1/2018) s/p 9-months of coumadin now off, pyoderma gangrenosum (improved on pred 50mg from July to September, on pred 50mg QD since 10/26) re-admitted for fevers and malodorous R lateral hip wound.     Exam with deep (>5cm), punched out ulcer with necrotic, purpuric borders. A large, foul-smelling hematoma was dislodged by Wound Care Team. Appearance c/w retiform purpura of unclear etiology - ddx coagulopathy vs. infection     At this time, recommend:  - 4mm punch biopsy x 3 performed of R thigh ulcer today (1 - H&E of purpura, 2 - H&E of erythematous surrounding border, 3 - Tissue culture for bacterial/fungi)  - Tissue culture also sent of hematoma dislodged from wound  - please send coagulopathy w/u as per last Chart Note   - would consider restarting prednisone 50mg QD given risk of adrenal insufficiency w/o tapering dose  - spoke with pt's  and daughter Betty who gave verbal consent for both bloodwork and biopsy    Plan discussed with Dermatology attending Dr. Pham remotely. Dermatology consult team will officially round and staff the patient tomorrow, with full consult note to follow. Please call [phone] with any questions. Dermatology Resident Brief Note    Patient evaluated at bedside with Wound Team, RN staff, and aide.     Briefly, 79 y/o F well-known to the Derm service with h/o CAD s/p PCI, AS s/p TAVR, TKR c/b multiple joint infections now on chronic suppressive ABX, provoked DVT (1/2018) s/p 9-months of coumadin now off, pyoderma gangrenosum (improved on pred 50mg from July to September, on pred 50mg QD since 10/26) re-admitted for fevers and malodorous R lateral hip wound. Exam with two deep (>5cm), punched out ulcers on R thigh with necrotic, purpuric borders. A large, foul-smelling hematoma was dislodged by Wound Care Team. Previous ulcers on pannus, anterior thighs are healing well.     Concern for retiform purpura of unclear etiology - ddx intravascular thrombosis/ hypercoagulability vs. vasculitis vs. angioinvasive infection     At this time, recommend:  - restart prednisone 40mg QD x 1 week, then taper by 10mg each week   - 4mm punch biopsy x 3 performed of R thigh ulcer today (1 - H&E of purpura, 2 - H&E of erythematous surrounding border, 3 - Tissue culture for bacterial/fungi)  - Tissue culture also sent of hematoma dislodged from wound  - please send coagulopathy w/u as per last Chart Note   - spoke with pt's  and daughter Betty who gave verbal consent for both bloodwork and biopsy    Plan discussed with Dermatology attending Dr. Pham remotely. Dermatology consult team will officially round and staff the patient tomorrow, with full consult note to follow. Please call [phone] with any questions.

## 2018-11-13 NOTE — CONSULT NOTE ADULT - SUBJECTIVE AND OBJECTIVE BOX
i was asked by surgical team to take over the care of this pt.  77 year old woman with PMH of CAD s/p stent to LAD, AS s/p TAVR, PPM, DVT () on coumadin, IVC filter, pyoderma gangrenosum, and total knee replacement c/b joint infections s/p I&D explantation, static spacer placement and complex wound closure by plastic surgery presents with fevers and malodorous right lateral hip wound. She was brought from [facility]. She complained of pain in her legs. On presentation she was febrile to 39.1, hypotensive (systolic 80s), WBC count 17, INR 2.6, and lactate 2.0. Of note, it was documented in Oct 2018 that patient has completed course of anticoagulation for provoked DVT. pt's family has refused surgery for this wound.  pt was seen by derm and wound was biopsied.      PAST MEDICAL & SURGICAL HISTORY:  CAD (coronary artery disease): HERBERT to LAD 2016  Aortic stenosis, severe  Uncomplicated asthma, unspecified asthma severity  Polymyalgia  Lumbar disc disease with radiculopathy  Spider veins  Anxiety  Severe aortic stenosis by prior echocardiogram:  and  2016  Dysphagia  Macular degeneration  Hiatal hernia  GERD (gastroesophageal reflux disease)  Sciatica  Osteoarthritis  S/P TKR (total knee replacement): joint infection s/p explant and abx spacer on 17  S/P TAVR (transcatheter aortic valve replacement): 17  Artificial cardiac pacemaker: 17  H/O cardiac catheterization: 16 HERBERT placed on LAD  H/O endoscopy: with dilatation of esophageal stricture   S/P cataract surgery: x2; 3-4yr ago  S/P gastroplasty: 28 yrs ago  S/P cholecystectomy: more than 15 years ago  S/P total knee replacement: left, 15yr ago  S/P total knee replacement: right, 12yr ago  S/P hysterectomy: 24yr ago      Review of Systems:   CONSTITUTIONAL: No fever, weight loss, or fatigue, generalized pain  EYES: No eye pain, visual disturbances, or discharge  ENMT:  No difficulty hearing, tinnitus, vertigo; No sinus or throat pain  NECK: No pain or stiffness  BREASTS: No pain, masses, or nipple discharge  RESPIRATORY: No cough, wheezing, chills or hemoptysis; No shortness of breath  CARDIOVASCULAR: No chest pain, palpitations, dizziness, or leg swelling  GASTROINTESTINAL: No abdominal or epigastric pain. No nausea, vomiting, or hematemesis; No diarrhea or constipation. No melena or hematochezia.  GENITOURINARY: No dysuria, frequency, hematuria, or incontinence  NEUROLOGICAL: No headaches, memory loss, loss of strength, numbness, or tremors  SKIN: No itching, burning, rashes, or lesions   LYMPH NODES: No enlarged glands  ENDOCRINE: No heat or cold intolerance; No hair loss  MUSCULOSKELETAL: No joint pain or swelling; No muscle, back, or extremity pain  PSYCHIATRIC: No depression, anxiety, mood swings, or difficulty sleeping  HEME/LYMPH: No easy bruising, or bleeding gums  ALLERY AND IMMUNOLOGIC: No hives or eczema    Allergies    amoxicillin (Other)    Intolerances    IV Contrast (Flushing (Skin); Nausea)      Social History:     FAMILY HISTORY:  No pertinent family history in first degree relatives      MEDICATIONS  (STANDING):  acetaminophen  IVPB .. 1000 milliGRAM(s) IV Intermittent once  buDESOnide 160 MICROgram(s)/formoterol 4.5 MICROgram(s) Inhaler 2 Puff(s) Inhalation two times a day  Dakins Solution - 1/4 Strength 1 Application(s) Topical two times a day  gabapentin 300 milliGRAM(s) Oral three times a day  lactated ringers. 1000 milliLiter(s) (75 mL/Hr) IV Continuous <Continuous>  meropenem  IVPB 2000 milliGRAM(s) IV Intermittent every 8 hours  meropenem  IVPB      pantoprazole    Tablet 40 milliGRAM(s) Oral before breakfast  simvastatin 20 milliGRAM(s) Oral at bedtime  vancomycin  IVPB      vancomycin  IVPB 1500 milliGRAM(s) IV Intermittent every 12 hours    MEDICATIONS  (PRN):  HYDROmorphone  Injectable 0.5 milliGRAM(s) IV Push every 4 hours PRN Severe Pain (7 - 10)      Vital Signs Last 24 Hrs  T(C): 37.1 (2018 17:19), Max: 38.2 (2018 22:50)  T(F): 98.8 (2018 17:19), Max: 100.7 (2018 22:50)  HR: 100 (2018 17:19) (83 - 106)  BP: 136/86 (2018 17:19) (102/63 - 136/86)  BP(mean): --  RR: 20 (2018 17:19) (20 - 20)  SpO2: 97% (2018 17:19) (96% - 98%)  CAPILLARY BLOOD GLUCOSE        I&O's Summary    2018 07:01  -  2018 07:00  --------------------------------------------------------  IN: 1765 mL / OUT: 0 mL / NET: 1765 mL    2018 07:01  -  2018 18:21  --------------------------------------------------------  IN: 0 mL / OUT: 0 mL / NET: 0 mL        PHYSICAL EXAM:  GENERAL: NAD, well-developed, obese  HEAD:  Atraumatic, Normocephalic  EYES: EOMI, PERRLA, conjunctiva and sclera clear  NECK: Supple, No JVD  CHEST/LUNG: Clear to auscultation bilaterally; No wheeze  HEART: Regular rate and rhythm; No murmurs, rubs, or gallops  ABDOMEN: Soft, Nontender, Nondistended; Bowel sounds present  EXTREMITIES:  2+ Peripheral Pulses, No clubbing, cyanosis, or edema  PSYCH: AAOx3  NEUROLOGY: non-focal  SKIN: stage 4 wounds on right lateral thigh    LABS:                        9.6    17.5  )-----------( 264      ( 2018 06:07 )             31.0     -13    138  |  101  |  15  ----------------------------<  63<L>  4.3   |  24  |  0.61    Ca    8.0<L>      2018 09:50  Phos  3.9     -13  Mg     1.6         TPro  5.9<L>  /  Alb  2.7<L>  /  TBili  0.3  /  DBili  x   /  AST  12  /  ALT  14  /  AlkPhos  72  11-12    PT/INR - ( 2018 10:00 )   PT: 25.3 sec;   INR: 2.17 ratio         PTT - ( 2018 10:00 )  PTT:28.2 sec      Urinalysis Basic - ( 2018 14:51 )    Color: Light Yellow / Appearance: Clear / S.039 / pH: x  Gluc: x / Ketone: Negative  / Bili: Negative / Urobili: Negative   Blood: x / Protein: Trace / Nitrite: Negative   Leuk Esterase: Large / RBC: 5 /hpf / WBC 28 /hpf   Sq Epi: x / Non Sq Epi: 2 /hpf / Bacteria: Negative        RADIOLOGY & ADDITIONAL TESTS:    Imaging Personally Reviewed:    Consultant(s) Notes Reviewed:      Care Discussed with Consultants/Other Providers:

## 2018-11-13 NOTE — PROGRESS NOTE ADULT - ASSESSMENT
77 year old woman with PMH of CAD s/p stent to LAD, AS s/p TAVR, PPM, DVT (Dx 1/18) on coumadin, pyoderma gangrenosum, and total knee replacement c/b joint infections s/p I&D explantation, static spacer placement and complex wound closure by plastic surgery, now admitted with necrotic right posterior thigh wound.    - NPO, IV fluids, antibiotics  - Patient to discuss with family about OR plans; currently does not want operative treatment  - f/u derm consult  -  plan  - f/u medicine    [facility]  [phone]

## 2018-11-13 NOTE — CHART NOTE - NSCHARTNOTEFT_GEN_A_CORE
I spoke with Mr. Rdz via telephone for 15 minutes tonight at his request. He expressed concern about taking Ms. Rdz to OR as he feels like she is at risk of failing to extubate after the surgery. We discussed the need for I&D of thigh wound as recommended by general surgery to decompress abscess and he wants to observe wound for 2-3 more days with local wound care before making a decision. He understands she is bactermic with MRSA and is on intravenous antibiotics. I also informed him of the various services that were involved in her care including gen surg, ID, dermatology. He stated multiple times that he wants to take her out of the hospital but I explained that she is acutely ill and needs to remain hospitalized.  He expressed understanding.     I also attempted to reach Kylah, his daughter to review case but no answer on her cell phone. Will follow up.    Nate Bass MD

## 2018-11-13 NOTE — CONSULT NOTE ADULT - ASSESSMENT
77 year old woman with PMH of CAD s/p stent to LAD, AS s/p TAVR, PPM, DVT (Jan '18) on coumadin, IVC filter, pyoderma gangrenosum, and total knee replacement c/b joint infections s/p I&D explantation, static spacer placement and complex wound closure by plastic surgery presents with fevers and malodorous right lateral hip wound. She was brought from [facility]. She complained of pain in her legs. On presentation she was febrile to 39.1, hypotensive (systolic 80s), WBC count 17, INR 2.6, and lactate 2.0. Of note, it was documented in Oct 2018 that patient has completed course of anticoagulation for provoked DVT. pt's family has refused surgery for this wound.  pt was seen by derm and wound was biopsied.    sepsis  - right hip wound  - cw abx as per ID  - meropenam and vanco  - seen by derm  - follow up biopsy results  - wound care follow up  - follow up cultures and wbc    h/o pyoderma gangrenosum  - cw prednisone  - rheum consult    pain  - cw neurontin  - iv dilaudid prn    HLD  - simvastatin    dvt px  - xarelto    gi px  - protonix

## 2018-11-13 NOTE — PROGRESS NOTE ADULT - SUBJECTIVE AND OBJECTIVE BOX
SUBJECTIVE: Pt seen, chart reviewed.  had had low grade fever during the night  Is awake , supine, NAD  patient evaluated today with Dermatology , PA, and RN staff  patient loudly protests any attempt at exam, and is unable to turn in bed    Allergies    amoxicillin (Other)    Intolerances    IV Contrast (Flushing (Skin); Nausea)      meropenem  IVPB      meropenem  IVPB 2000 milliGRAM(s) IV Intermittent every 8 hours  vancomycin  IVPB      vancomycin  IVPB 1500 milliGRAM(s) IV Intermittent every 12 hours      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  Artificial cardiac pacemaker: 12/25/17  H/O cardiac catheterization: 11/29/16 HERBERT placed on LAD  H/O endoscopy: with dilatation of esophageal stricture 2013  S/P cataract surgery: x2; 3-4yr ago  S/P gastroplasty: 28 yrs ago  S/P cholecystectomy: more than 15 years ago  S/P total knee replacement: left, 15yr ago  S/P total knee replacement: right, 12yr ago  S/P hysterectomy: 24yr ago      HEALTH ISSUES - PROBLEM Dx:          FAMILY HISTORY:  No pertinent family history in first degree relatives      Physical Exam:  Vital Signs Last 24 Hrs  T(C): 36.7 (13 Nov 2018 06:12), Max: 38.2 (12 Nov 2018 22:50)  T(F): 98 (13 Nov 2018 06:12), Max: 100.7 (12 Nov 2018 22:50)  HR: 86 (13 Nov 2018 06:12) (83 - 106)  BP: 127/71 (13 Nov 2018 06:12) (103/62 - 132/73)  BP(mean): --  RR: 20 (13 Nov 2018 06:12) (16 - 20)  SpO2: 96% (13 Nov 2018 06:12) (95% - 100%)    NAD  Using nasal O2, remains supine  Moon facies  Right knee wound is markedly improved as is lower abdominal wound and wounds of right anterior thigh  Problem wounds remain the wounds of right posterio, lateral thigh , of which there are several .,all FT  the most problematic wound is the more proximal wound, posterior, lateral right thigh, which shows liquifactive FT necrosis and covers a cavity , which is digitally explored. Following this a portion of what appears to be necrotic tissue, hematoma was self expressed, and submitted for studies. Wounds were packed with saline moistened 2' Jacqueline.  I subsequently spoke with Mr Rdz , who remains very appreciative of our efforts.    LABS:                        9.6    17.5  )-----------( 264      ( 12 Nov 2018 06:07 )             31.0     PT/INR - ( 13 Nov 2018 10:00 )   PT: 25.3 sec;   INR: 2.17 ratio         PTT - ( 13 Nov 2018 10:00 )  PTT:28.2 sec    Will continue to follow

## 2018-11-13 NOTE — PROGRESS NOTE ADULT - ATTENDING COMMENTS
seen and examined 11-13-18 @ 1200    c/o pain from right hip wounds    afeb  AVSS  bilateral hip wounds examined. the most superior wound on the right has brown necrotic tissue in it, but there is no fluctuance to suggest abscess and there is no crepitus to suggest NSTI. The other wounds are clean.  there is also a wound over the right patella.    BCx (11/12) - GPC in clusters (1/2 bottles), but not S aureus on PCR, so I suspect methicillin resistant Staph epidermidis which she has had in the past.    Unclear etiology of these wounds, so no indication for debridement, with the exception of the most superior right hip wound which seems to be infected and would benefit from debridement of necrotic tissue. The patient's family is refusing debridement at this time.  -on Vanco for suspected MRSE bacteremia of unclear etiology  -also on meropenem, but this is probably not necessary, will consult ID  -she can be transferred to the medical service for ABx and best managed by the wound care team who is already seeing her

## 2018-11-13 NOTE — CONSULT NOTE ADULT - SUBJECTIVE AND OBJECTIVE BOX
HPI:   Patient is a 78y female with    REVIEW OF SYSTEMS:  All other review of systems negative (Comprehensive ROS)    PAST MEDICAL & SURGICAL HISTORY:  CAD (coronary artery disease): HERBERT to LAD 2016  Aortic stenosis, severe  Uncomplicated asthma, unspecified asthma severity  Polymyalgia  Lumbar disc disease with radiculopathy  Spider veins  Anxiety  Severe aortic stenosis by prior echocardiogram:  and  2016  Dysphagia  Macular degeneration  Hiatal hernia  GERD (gastroesophageal reflux disease)  Sciatica  Osteoarthritis  S/P TKR (total knee replacement): joint infection s/p explant and abx spacer on 17  S/P TAVR (transcatheter aortic valve replacement): 17  Artificial cardiac pacemaker: 17  H/O cardiac catheterization: 16 HERBERT placed on LAD  H/O endoscopy: with dilatation of esophageal stricture   S/P cataract surgery: x2; 3-4yr ago  S/P gastroplasty: 28 yrs ago  S/P cholecystectomy: more than 15 years ago  S/P total knee replacement: left, 15yr ago  S/P total knee replacement: right, 12yr ago  S/P hysterectomy: 24yr ago      Allergies    amoxicillin (Other)    Intolerances    IV Contrast (Flushing (Skin); Nausea)      Antimicrobials Day #    meropenem  IVPB 2000 milliGRAM(s) IV Intermittent every 8 hours  meropenem  IVPB      vancomycin  IVPB      vancomycin  IVPB 1500 milliGRAM(s) IV Intermittent every 12 hours    Other Medications:  acetaminophen  IVPB .. 1000 milliGRAM(s) IV Intermittent once  acetaminophen  IVPB .. 1000 milliGRAM(s) IV Intermittent once  buDESOnide 160 MICROgram(s)/formoterol 4.5 MICROgram(s) Inhaler 2 Puff(s) Inhalation two times a day  gabapentin 300 milliGRAM(s) Oral three times a day  HYDROmorphone  Injectable 0.5 milliGRAM(s) IV Push every 4 hours PRN  lactated ringers. 1000 milliLiter(s) IV Continuous <Continuous>  pantoprazole    Tablet 40 milliGRAM(s) Oral before breakfast  simvastatin 20 milliGRAM(s) Oral at bedtime      FAMILY HISTORY:  No pertinent family history in first degree relatives      SOCIAL HISTORY:  Smoking:     ETOH:     Drug Use:     Single     T(F): 100.3 (18 @ 13:50), Max: 100.7 (18 @ 22:50)  HR: 102 (18 @ 13:50)  BP: 102/63 (18 @ 13:50)  RR: 20 (18 @ 13:50)  SpO2: 98% (18 @ 13:50)  Wt(kg): --    PHYSICAL EXAM:  General: alert, no acute distress  Eyes:  anicteric, no conjunctival injection, no discharge  Oropharynx: no lesions or injection 	  Neck: supple, without adenopathy  Lungs: clear to auscultation  Heart: regular rate and rhythm; no murmur, rubs or gallops  Abdomen: soft, nondistended, nontender, without mass or organomegaly  Skin: no lesions  Extremities: no clubbing, cyanosis, or edema  Neurologic: alert, oriented, moves all extremities    LAB RESULTS:                        9.6    17.5  )-----------( 264      ( 2018 06:07 )             31.0     -13    138  |  101  |  15  ----------------------------<  63<L>  4.3   |  24  |  0.61    Ca    8.0<L>      2018 09:50  Phos  3.9     11-13  Mg     1.6     -    TPro  5.9<L>  /  Alb  2.7<L>  /  TBili  0.3  /  DBili  x   /  AST  12  /  ALT  14  /  AlkPhos  72  11-12    LIVER FUNCTIONS - ( 2018 06:07 )  Alb: 2.7 g/dL / Pro: 5.9 g/dL / ALK PHOS: 72 U/L / ALT: 14 U/L / AST: 12 U/L / GGT: x           Urinalysis Basic - ( 2018 14:51 )    Color: Light Yellow / Appearance: Clear / S.039 / pH: x  Gluc: x / Ketone: Negative  / Bili: Negative / Urobili: Negative   Blood: x / Protein: Trace / Nitrite: Negative   Leuk Esterase: Large / RBC: 5 /hpf / WBC 28 /hpf   Sq Epi: x / Non Sq Epi: 2 /hpf / Bacteria: Negative        MICROBIOLOGY REVIEWED:    RADIOLOGY REVIEWED: HPI:   Patient is a 78y female with hx CAD, AS s/p TAVR, PPM, DVT () on coumadin, IVC filter, pyoderma gangrenosum, and total knee replacement c/b joint infections s/p I&D explantation, spacer placement and complex wound closure by plastic surgery presents with fevers and malodorous right lateral hip wound. She was brought from [facility]. She complained of pain in her legs. On presentation she was febrile to 39.1, hypotensive (systolic 80s), WBC count 17.    She was recently hospitalzied with fever and had prior Coag neg staph bacteremias, PICC removed. She has been no on chronic doxy suppression and steroids as per derm. Pt now with staph bacteremia again and was started on broad spectrum coverage.     REVIEW OF SYSTEMS:  All other review of systems negative (Comprehensive ROS)    PAST MEDICAL & SURGICAL HISTORY:  CAD (coronary artery disease): HERBERT to LAD 2016  Aortic stenosis, severe  Uncomplicated asthma, unspecified asthma severity  Polymyalgia  Lumbar disc disease with radiculopathy  Spider veins  Anxiety  Severe aortic stenosis by prior echocardiogram:  and  2016  Dysphagia  Macular degeneration  Hiatal hernia  GERD (gastroesophageal reflux disease)  Sciatica  Osteoarthritis  S/P TKR (total knee replacement): joint infection s/p explant and abx spacer on 17  S/P TAVR (transcatheter aortic valve replacement): 17  Artificial cardiac pacemaker: 17  H/O cardiac catheterization: 16 HERBERT placed on LAD  H/O endoscopy: with dilatation of esophageal stricture   S/P cataract surgery: x2; 3-4yr ago  S/P gastroplasty: 28 yrs ago  S/P cholecystectomy: more than 15 years ago  S/P total knee replacement: left, 15yr ago  S/P total knee replacement: right, 12yr ago  S/P hysterectomy: 24yr ago      Allergies    amoxicillin (Other)    Intolerances    IV Contrast (Flushing (Skin); Nausea)      Antimicrobials Day #    meropenem  IVPB 2000 milliGRAM(s) IV Intermittent every 8 hours  meropenem  IVPB      vancomycin  IVPB      vancomycin  IVPB 1500 milliGRAM(s) IV Intermittent every 12 hours    Other Medications:  acetaminophen  IVPB .. 1000 milliGRAM(s) IV Intermittent once  acetaminophen  IVPB .. 1000 milliGRAM(s) IV Intermittent once  buDESOnide 160 MICROgram(s)/formoterol 4.5 MICROgram(s) Inhaler 2 Puff(s) Inhalation two times a day  gabapentin 300 milliGRAM(s) Oral three times a day  HYDROmorphone  Injectable 0.5 milliGRAM(s) IV Push every 4 hours PRN  lactated ringers. 1000 milliLiter(s) IV Continuous <Continuous>  pantoprazole    Tablet 40 milliGRAM(s) Oral before breakfast  simvastatin 20 milliGRAM(s) Oral at bedtime      FAMILY HISTORY:  No pertinent family history in first degree relatives      SOCIAL HISTORY:  Smoking:     ETOH:     Drug Use:     Single     T(F): 100.3 (18 @ 13:50), Max: 100.7 (18 @ 22:50)  HR: 102 (18 @ 13:50)  BP: 102/63 (18 @ 13:50)  RR: 20 (18 @ 13:50)  SpO2: 98% (18 @ 13:50)  Wt(kg): --    PHYSICAL EXAM:  General: chronically ill appearing  Eyes:  anicteric, no conjunctival injection, no discharge  Oropharynx: no lesions or injection 	  Neck: supple, without adenopathy  Lungs: diminished at bases  Heart: regular rate and rhythm; no murmur, rubs or gallops  Abdomen: soft, nondistended, nontender  Skin: scattered areas of pigmentation, wound debrided with packing in place  Extremities: + edema  Neurologic: alert, weak    LAB RESULTS:                        9.6    17.5  )-----------( 264      ( 2018 06:07 )             31.0     -    138  |  101  |  15  ----------------------------<  63<L>  4.3   |  24  |  0.61    Ca    8.0<L>      2018 09:50  Phos  3.9     -  Mg     1.6         TPro  5.9<L>  /  Alb  2.7<L>  /  TBili  0.3  /  DBili  x   /  AST  12  /  ALT  14  /  AlkPhos  72  11-12    LIVER FUNCTIONS - ( 2018 06:07 )  Alb: 2.7 g/dL / Pro: 5.9 g/dL / ALK PHOS: 72 U/L / ALT: 14 U/L / AST: 12 U/L / GGT: x           Urinalysis Basic - ( 2018 14:51 )    Color: Light Yellow / Appearance: Clear / S.039 / pH: x  Gluc: x / Ketone: Negative  / Bili: Negative / Urobili: Negative   Blood: x / Protein: Trace / Nitrite: Negative   Leuk Esterase: Large / RBC: 5 /hpf / WBC 28 /hpf   Sq Epi: x / Non Sq Epi: 2 /hpf / Bacteria: Negative        MICROBIOLOGY REVIEWED:    RADIOLOGY REVIEWED:

## 2018-11-13 NOTE — PROGRESS NOTE ADULT - SUBJECTIVE AND OBJECTIVE BOX
SURGERY DAILY PROGRESS NOTE:     Subjective:    Patient was seen and examined this morning during morning rounds. Patient no longer wants operation for debridement and would like to discuss with her family more.     Objective:    NAD, awake and alert  Respirations nonlabored  Abdomen soft, nontender, nondistended  No guarding or rebound tenderness   Ext: Warm, well perfused, multiple wounds with fibrinous exudate on bilateral thighs, large necrotic foul smelling wound on right posterior thigh with dark brown drainage and surrounding erythema, no crepitus    MEDICATIONS  (STANDING):  buDESOnide 160 MICROgram(s)/formoterol 4.5 MICROgram(s) Inhaler 2 Puff(s) Inhalation two times a day  gabapentin 300 milliGRAM(s) Oral three times a day  lactated ringers. 1000 milliLiter(s) (75 mL/Hr) IV Continuous <Continuous>  meropenem  IVPB      meropenem  IVPB 2000 milliGRAM(s) IV Intermittent every 8 hours  pantoprazole    Tablet 40 milliGRAM(s) Oral before breakfast  simvastatin 20 milliGRAM(s) Oral at bedtime  vancomycin  IVPB      vancomycin  IVPB 1500 milliGRAM(s) IV Intermittent every 12 hours    MEDICATIONS  (PRN):  HYDROmorphone  Injectable 0.5 milliGRAM(s) IV Push every 4 hours PRN Severe Pain (7 - 10)      Vital Signs Last 24 Hrs  T(C): 36.7 (2018 06:12), Max: 38.2 (2018 22:50)  T(F): 98 (2018 06:12), Max: 100.7 (2018 22:50)  HR: 86 (2018 06:12) (83 - 106)  BP: 127/71 (2018 06:12) (96/63 - 132/73)  BP(mean): --  RR: 20 (2018 06:12) (16 - 20)  SpO2: 96% (2018 06:12) (95% - 100%)    I&O's Detail    2018 07:01  -  2018 07:00  --------------------------------------------------------  IN:    IV PiggyBack: 600 mL    lactated ringers.: 825 mL    Oral Fluid: 240 mL    Solution: 100 mL  Total IN: 1765 mL    OUT:  Total OUT: 0 mL    Total NET: 1765 mL          Daily     Daily     LABS:                        9.6    17.5  )-----------( 264      ( 2018 06:07 )             31.0         146<H>  |  105  |  25<H>  ----------------------------<  89  5.0   |  31  |  0.77    Ca    8.7      2018 06:07    TPro  5.9<L>  /  Alb  2.7<L>  /  TBili  0.3  /  DBili  x   /  AST  12  /  ALT  14  /  AlkPhos  72      PT/INR - ( 2018 06:09 )   PT: 30.6 sec;   INR: 2.61 ratio         PTT - ( 2018 06:09 )  PTT:35.1 sec  Urinalysis Basic - ( 2018 14:51 )    Color: Light Yellow / Appearance: Clear / S.039 / pH: x  Gluc: x / Ketone: Negative  / Bili: Negative / Urobili: Negative   Blood: x / Protein: Trace / Nitrite: Negative   Leuk Esterase: Large / RBC: 5 /hpf / WBC 28 /hpf   Sq Epi: x / Non Sq Epi: 2 /hpf / Bacteria: Negative

## 2018-11-14 LAB
C3 SERPL-MCNC: 137 MG/DL — SIGNIFICANT CHANGE UP (ref 81–157)
C4 SERPL-MCNC: 61 MG/DL — HIGH (ref 13–39)
CULTURE RESULTS: SIGNIFICANT CHANGE UP
GRAM STN FLD: SIGNIFICANT CHANGE UP
HCYS SERPL-MCNC: 6 UMOL/L — SIGNIFICANT CHANGE UP (ref 5–20)
INR BLD: 2.79 RATIO — HIGH (ref 0.88–1.16)
ORGANISM # SPEC MICROSCOPIC CNT: SIGNIFICANT CHANGE UP
ORGANISM # SPEC MICROSCOPIC CNT: SIGNIFICANT CHANGE UP
PROTHROM AB SERPL-ACNC: 33.1 SEC — HIGH (ref 10–12.9)
SPECIMEN SOURCE: SIGNIFICANT CHANGE UP

## 2018-11-14 PROCEDURE — 99232 SBSQ HOSP IP/OBS MODERATE 35: CPT

## 2018-11-14 PROCEDURE — G0452: CPT | Mod: 26

## 2018-11-14 RX ORDER — HYDROMORPHONE HYDROCHLORIDE 2 MG/ML
0.5 INJECTION INTRAMUSCULAR; INTRAVENOUS; SUBCUTANEOUS ONCE
Qty: 0 | Refills: 0 | Status: DISCONTINUED | OUTPATIENT
Start: 2018-11-14 | End: 2018-11-14

## 2018-11-14 RX ADMIN — Medication 50 MILLIGRAM(S): at 06:19

## 2018-11-14 RX ADMIN — Medication 12.5 MILLIGRAM(S): at 18:10

## 2018-11-14 RX ADMIN — MEROPENEM 200 MILLIGRAM(S): 1 INJECTION INTRAVENOUS at 22:01

## 2018-11-14 RX ADMIN — Medication 300 MILLIGRAM(S): at 13:13

## 2018-11-14 RX ADMIN — HEPARIN SODIUM 5000 UNIT(S): 5000 INJECTION INTRAVENOUS; SUBCUTANEOUS at 06:18

## 2018-11-14 RX ADMIN — HYDROMORPHONE HYDROCHLORIDE 0.5 MILLIGRAM(S): 2 INJECTION INTRAMUSCULAR; INTRAVENOUS; SUBCUTANEOUS at 06:35

## 2018-11-14 RX ADMIN — Medication 3 MILLIGRAM(S): at 21:06

## 2018-11-14 RX ADMIN — MEROPENEM 200 MILLIGRAM(S): 1 INJECTION INTRAVENOUS at 01:43

## 2018-11-14 RX ADMIN — BUDESONIDE AND FORMOTEROL FUMARATE DIHYDRATE 2 PUFF(S): 160; 4.5 AEROSOL RESPIRATORY (INHALATION) at 18:10

## 2018-11-14 RX ADMIN — Medication 300 MILLIGRAM(S): at 21:02

## 2018-11-14 RX ADMIN — HYDROMORPHONE HYDROCHLORIDE 0.5 MILLIGRAM(S): 2 INJECTION INTRAMUSCULAR; INTRAVENOUS; SUBCUTANEOUS at 12:50

## 2018-11-14 RX ADMIN — HYDROMORPHONE HYDROCHLORIDE 0.5 MILLIGRAM(S): 2 INJECTION INTRAMUSCULAR; INTRAVENOUS; SUBCUTANEOUS at 06:04

## 2018-11-14 RX ADMIN — GABAPENTIN 300 MILLIGRAM(S): 400 CAPSULE ORAL at 21:06

## 2018-11-14 RX ADMIN — HYDROMORPHONE HYDROCHLORIDE 0.5 MILLIGRAM(S): 2 INJECTION INTRAMUSCULAR; INTRAVENOUS; SUBCUTANEOUS at 11:51

## 2018-11-14 RX ADMIN — MEROPENEM 200 MILLIGRAM(S): 1 INJECTION INTRAVENOUS at 13:13

## 2018-11-14 RX ADMIN — GABAPENTIN 300 MILLIGRAM(S): 400 CAPSULE ORAL at 06:18

## 2018-11-14 RX ADMIN — SIMVASTATIN 20 MILLIGRAM(S): 20 TABLET, FILM COATED ORAL at 21:06

## 2018-11-14 RX ADMIN — HEPARIN SODIUM 5000 UNIT(S): 5000 INJECTION INTRAVENOUS; SUBCUTANEOUS at 21:06

## 2018-11-14 RX ADMIN — Medication 81 MILLIGRAM(S): at 13:27

## 2018-11-14 RX ADMIN — BUDESONIDE AND FORMOTEROL FUMARATE DIHYDRATE 2 PUFF(S): 160; 4.5 AEROSOL RESPIRATORY (INHALATION) at 06:19

## 2018-11-14 RX ADMIN — Medication 1 APPLICATION(S): at 13:24

## 2018-11-14 RX ADMIN — GABAPENTIN 300 MILLIGRAM(S): 400 CAPSULE ORAL at 13:27

## 2018-11-14 RX ADMIN — SODIUM CHLORIDE 75 MILLILITER(S): 9 INJECTION, SOLUTION INTRAVENOUS at 22:08

## 2018-11-14 RX ADMIN — HEPARIN SODIUM 5000 UNIT(S): 5000 INJECTION INTRAVENOUS; SUBCUTANEOUS at 13:26

## 2018-11-14 RX ADMIN — PANTOPRAZOLE SODIUM 40 MILLIGRAM(S): 20 TABLET, DELAYED RELEASE ORAL at 06:19

## 2018-11-14 RX ADMIN — Medication 12.5 MILLIGRAM(S): at 06:18

## 2018-11-14 RX ADMIN — HYDROMORPHONE HYDROCHLORIDE 0.5 MILLIGRAM(S): 2 INJECTION INTRAMUSCULAR; INTRAVENOUS; SUBCUTANEOUS at 18:30

## 2018-11-14 RX ADMIN — HYDROMORPHONE HYDROCHLORIDE 0.5 MILLIGRAM(S): 2 INJECTION INTRAMUSCULAR; INTRAVENOUS; SUBCUTANEOUS at 18:09

## 2018-11-14 NOTE — PROGRESS NOTE ADULT - ASSESSMENT
78y female with hx CAD, AS s/p TAVR, PPM, DVT, IVC filter, pyoderma gangrenosum, and total knee replacement c/b joint infections s/p I&D explantation, spacer placement and complex wound closure by plastic surgery presents with fevers and malodorous right lateral hip wound. She was brought from [facility]. She complained of pain in her legs. On presentation she was febrile to 39.1, hypotensive (systolic 80s), WBC count 17.    She was recently hospitalzied with fever and had prior Coag neg staph bacteremias, PICC removed. She has been no on chronic doxy suppression and steroids as per derm.   Pt now with staph bacteremia again and was started on broad spectrum coverage.     PLAN:    f/u cultures, WBC trend  local wound care as per derm/surgery  cont empiric Vanco and meropenem for now in view of fever and leukocytosis  the significance  of CoNs in 1/4 bottles not significant at present, but in view of prior bacteremia and now with fever and leukocytosis, would cont present regimen until cultures finalized 78y female with hx CAD, AS s/p TAVR, PPM, DVT, IVC filter, pyoderma gangrenosum, and total knee replacement c/b joint infections s/p I&D explantation, spacer placement and complex wound closure by plastic surgery  A/w fevers and malodorous right lateral hip wound, febrile to 39.1, hypotensive, WBC count 17.    She was recently hospitalized with fever and had prior Coag neg staph bacteremias, PICC removed.   She has been on chronic doxy suppression and steroids as per derm.   Pt now with staph bacteremia again and was started on broad spectrum coverage.  blood cult with 1/4 CoNS  wound culture-pending     PLAN:    f/u cultures, WBC trend  local wound care as per derm/surgery  cont empiric Vanco and meropenem for now in view of fever and leukocytosis  d/w pt and family at bedside

## 2018-11-14 NOTE — PROGRESS NOTE ADULT - SUBJECTIVE AND OBJECTIVE BOX
SUBJECTIVE: Pt seen, chart reviewed.  patient supine , using nsal O2, with non labored respirations  Lissa , former aide,  and Chau new aide ,both present    Allergies    amoxicillin (Other)    Intolerances    IV Contrast (Flushing (Skin); Nausea)      aspirin enteric coated 81 milliGRAM(s) Oral daily  heparin  Injectable 5000 Unit(s) SubCutaneous every 8 hours  meropenem  IVPB      meropenem  IVPB 2000 milliGRAM(s) IV Intermittent every 8 hours  vancomycin  IVPB      vancomycin  IVPB 1500 milliGRAM(s) IV Intermittent every 12 hours      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  Artificial cardiac pacemaker: 12/25/17  H/O cardiac catheterization: 11/29/16 HERBERT placed on LAD  H/O endoscopy: with dilatation of esophageal stricture 2013  S/P cataract surgery: x2; 3-4yr ago  S/P gastroplasty: 28 yrs ago  S/P cholecystectomy: more than 15 years ago  S/P total knee replacement: left, 15yr ago  S/P total knee replacement: right, 12yr ago  S/P hysterectomy: 24yr ago      HEALTH ISSUES - PROBLEM Dx:          FAMILY HISTORY:  No pertinent family history in first degree relatives      Physical Exam:  Vital Signs Last 24 Hrs  T(C): 36.9 (14 Nov 2018 09:52), Max: 38.1 (13 Nov 2018 16:15)  T(F): 98.5 (14 Nov 2018 09:52), Max: 100.6 (13 Nov 2018 16:15)  HR: 71 (14 Nov 2018 09:52) (71 - 102)  BP: 95/59 (14 Nov 2018 09:52) (95/59 - 142/73)  BP(mean): --  RR: 20 (14 Nov 2018 09:52) (20 - 20)  SpO2: 95% (14 Nov 2018 09:52) (95% - 98%)     Mandel facies persists  remains non ambulatory and incontinent of urine    Discussed need for dressing change with patient who consented to this being done  3 people were needed to turn patient to a left lateral decubitus position, at which time she began to protest  Right lateral thigh dressing was saturated  with foul smelling, brownish drainage which was previously present  No hematoma expressed  Packing removed from superior, lateral  draining wound , covered with necrotic eschar, cavity digitally explored , and Dakin moistened kathrin placed in cavity which approaches depth of bone  All wounds dressed  patient protested, and remained agitated, during entire dressing change, despite our explanations    Analgesics administered     T/C with patient's daughter  Status directly discussed with Medical and Surgical attendings    Again explained to patient rationale for dressing change     LABS:    PT/INR - ( 14 Nov 2018 11:00 )   PT: 33.1 sec;   INR: 2.79 ratio         PTT - ( 13 Nov 2018 10:00 )  PTT:28.2 sec      Outpatient follow up information provided- [phone]

## 2018-11-14 NOTE — CONSULT NOTE ADULT - ASSESSMENT
# Necrotic leg ulcers  With retiform purpura and necrotic tissue - Ddx includes angioinvasive infection vs. intravascular thrombosis/hypercoagulability  - tissue cx/GS of clotted blood +GPCs in chains   - prelim bx results: abscess and tissue necrosis in subcutis, and fibrin thrombi in some vascular lumens. Infectious etiology and coagulopathic work up are essential.   - cont vanc/harish per ID  - please send D-dimer and fibrinogen with next lab draw  - will f/u coagulopathy w/u     # Pyoderma gangrenosum  Old abdominal ulcers healing well  - continue prednisone 50mg QD, would taper by 10mg each week given improvement of prior ulcers # Retiform purpura   Surrounding deep, necrotic ulcer. Ddx includes angioinvasive infection vs. intravascular thrombosis/hypercoagulability  - tissue cx/GS of clotted blood +GPCs in chains   - prelim bx results: abscess and tissue necrosis in subcutis, and fibrin thrombi in some vascular lumens. Infectious etiology and coagulopathic work up are essential.   - cont vanc/harish per ID  - please send D-dimer and fibrinogen with next lab draw  - will f/u coagulopathy w/u     # Pyoderma gangrenosum  Old abdominal ulcers healing well  - continue prednisone 50mg QD, would taper by 10mg each week given improvement of prior ulcers # Retiform purpura   Surrounding deep, necrotic ulcer. Ddx includes angioinvasive infection vs. intravascular thrombosis/hypercoagulability  - tissue cx/GS clotted blood +GPCs in chains   - tissue cx/GS skin NGTD  - prelim bx results: abscess and tissue necrosis in subcutis, and fibrin thrombi in some vascular lumens. Infectious etiology and coagulopathic work up are essential.   - please send D-dimer and fibrinogen with next lab draw  - cont vanc/harish per ID  - will f/u coagulopathy w/u     # Pyoderma gangrenosum  Old abdominal ulcers healing well  - continue prednisone 50mg QD, would taper by 10mg each week given improvement of prior ulcers

## 2018-11-14 NOTE — CONSULT NOTE ADULT - CONSULT REASON
staph bacteremia
Pyoderma gangrenosum
Wound management for R knee and R medial thigh
multiple medical issues

## 2018-11-14 NOTE — PROGRESS NOTE ADULT - SUBJECTIVE AND OBJECTIVE BOX
CC: f/u for wound infections    Patient is very weak, bedbound, uncomfortable due to pain    REVIEW OF SYSTEMS: low grade fever, limited  All other review of systems negative (Comprehensive ROS)    Antimicrobials Day #    meropenem  IVPB      meropenem  IVPB 2000 milliGRAM(s) IV Intermittent every 8 hours  vancomycin  IVPB      vancomycin  IVPB 1500 milliGRAM(s) IV Intermittent every 12 hours    Other Medications Reviewed    T(F): 98.2 (18 @ 06:16), Max: 100.6 (18 @ 16:15)  HR: 94 (18 @ 06:16)  BP: 139/82 (18 @ 06:16)  RR: 20 (18 @ 06:16)  SpO2: 95% (18 @ 06:16)    PHYSICAL EXAM:    Eyes:  anicteric, no conjunctival injection, no discharge  Oropharynx: no lesions or injection 	  Neck: supple, without adenopathy  Lungs: diminished at bases  Heart: regular rate and rhythm; no murmur, rubs or gallops  Abdomen: soft, nondistended, nontender  Skin: scattered areas of pigmentation, wound debrided with packing in place  Extremities: + edema  Neurologic: alert, weak  LAB RESULTS:        138  |  101  |  15  ----------------------------<  63<L>  4.3   |  24  |  0.61    Ca    8.0<L>      2018 09:50  Phos  3.9     -  Mg     1.6             Urinalysis Basic - ( 2018 14:51 )    Color: Light Yellow / Appearance: Clear / S.039 / pH: x  Gluc: x / Ketone: Negative  / Bili: Negative / Urobili: Negative   Blood: x / Protein: Trace / Nitrite: Negative   Leuk Esterase: Large / RBC: 5 /hpf / WBC 28 /hpf   Sq Epi: x / Non Sq Epi: 2 /hpf / Bacteria: Negative        MICROBIOLOGY REVIEWED:    RADIOLOGY REVIEWED: CC: f/u for wound infections    Patient is very weak, bedbound, uncomfortable due to pain    REVIEW OF SYSTEMS: low grade fever, limited  All other review of systems negative (Comprehensive ROS)    Antimicrobials Day # 2   meropenem  IVPB      meropenem  IVPB 2000 milliGRAM(s) IV Intermittent every 8 hours  vancomycin  IVPB      vancomycin  IVPB 1500 milliGRAM(s) IV Intermittent every 12 hours    Other Medications Reviewed    T(F): 98.2 (18 @ 06:16), Max: 100.6 (18 @ 16:15)  HR: 94 (18 @ 06:16)  BP: 139/82 (18 @ 06:16)  RR: 20 (18 @ 06:16)  SpO2: 95% (18 @ 06:16)    PHYSICAL EXAM:    Eyes:  anicteric, no conjunctival injection, no discharge  Oropharynx: no lesions or injection 	  Neck: supple, without adenopathy  Lungs: diminished at bases  Heart: regular rate and rhythm; no murmur, rubs or gallops  Abdomen: soft, nondistended, nontender  Skin: scattered areas of pigmentation, wound debrided with packing in place  Extremities: + edema  Neurologic: alert, weak  LAB RESULTS:        138  |  101  |  15  ----------------------------<  63<L>  4.3   |  24  |  0.61    Ca    8.0<L>      2018 09:50  Phos  3.9     -  Mg     1.6     -        Urinalysis Basic - ( 2018 14:51 )    Color: Light Yellow / Appearance: Clear / S.039 / pH: x  Gluc: x / Ketone: Negative  / Bili: Negative / Urobili: Negative   Blood: x / Protein: Trace / Nitrite: Negative   Leuk Esterase: Large / RBC: 5 /hpf / WBC 28 /hpf   Sq Epi: x / Non Sq Epi: 2 /hpf / Bacteria: Negative        MICROBIOLOGY REVIEWED:    RADIOLOGY REVIEWED:

## 2018-11-14 NOTE — PROGRESS NOTE ADULT - ASSESSMENT
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 requiring I&D, TKA explant, static spacer placement with complex wound closure by plastic surgery complicated by wound healing problems and development of pyoderma granulosum  now returned with leukocytosis and fevers with new dependent R thigh posterolateral ulceration    appreciate gen surg care for possibly septic ulceration R post thigh, agree with their plan for OR for debridement given patient's poor ability to tolerate bedside debridement, family/patient refusing surgery  remainder thigh/abd/knee wounds are managed by PRS/wound care  appreciate derm c/s, pending biopsy results  pending rheum consult  abx per ID, on vanc/meropenem, after will need to go back on lifelong doxycycline for chronic suppression but defer choice to ID  continue to spend majority of day oob to wheelchair, okay to ambulate but must remain 50% weightbearing on RLE with NO KNEE MOTION allowed, knee immobilizer at all times if OOB  dvt ppx per primary  rec nutrition consult recommended, optimize nutrition for continued wound healing  Minimize narcotics for this patient, she tends to become oversedated  Recommend dc to subacute rehab as opposed to home after this hospitalization, her  and patient lack the necessary support system to take care of her at home and she did not follow up with any of her doctors after her prolonged hospitalization

## 2018-11-14 NOTE — PROGRESS NOTE ADULT - SUBJECTIVE AND OBJECTIVE BOX
pain controlled  bcx: coag neg staph in 1/4  wond cultures +GPC  refusing I&D by general surgery    afvss  nad  abdominal wound - healed  RLE  posterior thigh - unable to eval today but per report w/foul smell and exudate and erythema, patient defers exam  lateral/anterior thigh - mostly healed  knee wound mostly healed, epithelializing, no drainage  remainder of wounds dressed by wound care  5/5 ta/ehl/gcs  silt l4-s1  2+ dp pulse     ros: depressed, addressed by behavioral health

## 2018-11-14 NOTE — PROGRESS NOTE ADULT - ASSESSMENT
77 year old woman with PMH of CAD s/p stent to LAD, AS s/p TAVR, PPM, DVT (Dx 1/18) on coumadin, pyoderma gangrenosum, and total knee replacement c/b joint infections s/p I&D explantation, static spacer placement and complex wound closure by plastic surgery, now admitted with necrotic right posterior thigh wound. Patient declining surgery at this time.     - Patient to discuss with family about OR plans; currently does not want operative treatment  - f/u derm consult  - f/u medicine    [facility]  [phone]

## 2018-11-14 NOTE — PROGRESS NOTE ADULT - ASSESSMENT
77 year old woman with PMH of CAD s/p stent to LAD, AS s/p TAVR, PPM, DVT (Jan '18) on coumadin, IVC filter, pyoderma gangrenosum, and total knee replacement c/b joint infections s/p I&D explantation, static spacer placement and complex wound closure by plastic surgery presents with fevers and malodorous right lateral hip wound. She was brought from [facility]. She complained of pain in her legs. On presentation she was febrile to 39.1, hypotensive (systolic 80s), WBC count 17, INR 2.6, and lactate 2.0. Of note, it was documented in Oct 2018 that patient has completed course of anticoagulation for provoked DVT. pt's family has refused surgery for this wound.  pt was seen by derm and wound was biopsied.    sepsis  - right hip wound  - cw abx as per ID  - meropenam and vanco  - seen by derm  - follow up biopsy results  - wound care follow up  - follow up cultures and wbc    h/o pyoderma gangrenosum  - cw prednisone  - rheum consult    pain  - cw neurontin  - iv dilaudid prn    HLD  - simvastatin    dvt px  - heparin    gi px  - protonix    pt eval

## 2018-11-14 NOTE — CONSULT NOTE ADULT - SUBJECTIVE AND OBJECTIVE BOX
HPI:  79 y/o F well-known to the Derm service with h/o CAD s/p PCI, AS s/p TAVR, TKR c/b multiple joint infections now on chronic suppressive ABX, provoked DVT (1/2018) s/p 9-months of coumadin now off, pyoderma gangrenosum (improved on pred 50mg from July to September, on pred 50mg QD since 10/26) re-admitted for fevers and malodorous R lateral hip wound. Brought in from [facility] on 11/12. On presentation she was febrile to 39.1, hypotensive (SBPs in 80s), WBC count 17, INR 2.6, and lactate 2.0. Since arrival, started on vanc/harish for sepsis of unknown etiology. UCx with polymicrobial growth, suspected contaminant. BCx 1/4 with CoNS.       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  Artificial cardiac pacemaker: 12/25/17  H/O cardiac catheterization: 11/29/16 HERBERT placed on LAD  H/O endoscopy: with dilatation of esophageal stricture 2013  S/P cataract surgery: x2; 3-4yr ago  S/P gastroplasty: 28 yrs ago  S/P cholecystectomy: more than 15 years ago  S/P total knee replacement: left, 15yr ago  S/P total knee replacement: right, 12yr ago  S/P hysterectomy: 24yr ago      REVIEW OF SYSTEMS    General: as per HPI	    Skin/Breast: see HPI  	  Ophthalmologic: no eye pain or change in vision  	  ENMT: no dysphagia or change in hearing    Respiratory and Thorax: no SOB or cough  	  Cardiovascular: no palpitations or chest pain    Gastrointestinal: no abdominal pain or blood in stool     Genitourinary: no dysuria or frequency    Musculoskeletal: no joint pains    Neurological: no weakness, numbness , or tingling    MEDICATIONS  (STANDING):  acetaminophen  IVPB .. 1000 milliGRAM(s) IV Intermittent once  aspirin enteric coated 81 milliGRAM(s) Oral daily  buDESOnide 160 MICROgram(s)/formoterol 4.5 MICROgram(s) Inhaler 2 Puff(s) Inhalation two times a day  Dakins Solution - 1/4 Strength 1 Application(s) Topical two times a day  gabapentin 300 milliGRAM(s) Oral three times a day  heparin  Injectable 5000 Unit(s) SubCutaneous every 8 hours  lactated ringers. 1000 milliLiter(s) (75 mL/Hr) IV Continuous <Continuous>  melatonin 3 milliGRAM(s) Oral at bedtime  meropenem  IVPB      meropenem  IVPB 2000 milliGRAM(s) IV Intermittent every 8 hours  metoprolol tartrate 12.5 milliGRAM(s) Oral two times a day  pantoprazole    Tablet 40 milliGRAM(s) Oral before breakfast  predniSONE   Tablet 50 milliGRAM(s) Oral daily  simvastatin 20 milliGRAM(s) Oral at bedtime  vancomycin  IVPB      vancomycin  IVPB 1500 milliGRAM(s) IV Intermittent every 12 hours    MEDICATIONS  (PRN):  HYDROmorphone  Injectable 0.5 milliGRAM(s) IV Push every 4 hours PRN Severe Pain (7 - 10)  polyethylene glycol 3350 17 Gram(s) Oral daily PRN Constipation      Allergies    amoxicillin (Other)    Intolerances    IV Contrast (Flushing (Skin); Nausea)      SOCIAL HISTORY: No significant EtOH, drug, or tobacco use    FAMILY HISTORY:  No pertinent family history in first degree relatives      Vital Signs Last 24 Hrs  T(C): 37.2 (14 Nov 2018 13:34), Max: 37.2 (14 Nov 2018 13:34)  T(F): 98.9 (14 Nov 2018 13:34), Max: 98.9 (14 Nov 2018 13:34)  HR: 78 (14 Nov 2018 13:34) (71 - 100)  BP: 120/69 (14 Nov 2018 13:34) (95/59 - 142/73)  BP(mean): --  RR: 20 (14 Nov 2018 13:34) (20 - 20)  SpO2: 98% (14 Nov 2018 13:34) (95% - 98%)    PHYSICAL EXAM:     The patient was alert and oriented X 3, well nourished, and in mild distress with dressing changes  OP showed no ulcerations  There was no visible lymphadenopathy.  Conjunctiva were non injected  There was no clubbing or edema of extremities.  The scalp, hair, face, eyebrows, lips, OP, neck, chest, back,   extremities X 4, nails were examined.  There was no hyperhidrosis or bromhidrosis.    Of note on skin exam:   - R thigh: retiform purpura surrounding two deep (>5cm), punched out ulcers with foul-smelling, dark brown drainage     LABS:    11-13    138  |  101  |  15  ----------------------------<  63<L>  4.3   |  24  |  0.61    Ca    8.0<L>      13 Nov 2018 09:50  Phos  3.9     11-13  Mg     1.6     11-13      PT/INR - ( 14 Nov 2018 11:00 )   PT: 33.1 sec;   INR: 2.79 ratio         PTT - ( 13 Nov 2018 10:00 )  PTT:28.2 sec      RADIOLOGY & ADDITIONAL STUDIES:

## 2018-11-14 NOTE — PROGRESS NOTE ADULT - SUBJECTIVE AND OBJECTIVE BOX
Patient is a 78y old  Female who presents with a chief complaint of Wound infection (2018 09:19)      SUBJECTIVE / OVERNIGHT EVENTS:  pain is controlled. more alert today.  " I want to go home"    MEDICATIONS  (STANDING):  acetaminophen  IVPB .. 1000 milliGRAM(s) IV Intermittent once  aspirin enteric coated 81 milliGRAM(s) Oral daily  buDESOnide 160 MICROgram(s)/formoterol 4.5 MICROgram(s) Inhaler 2 Puff(s) Inhalation two times a day  Dakins Solution - 1/4 Strength 1 Application(s) Topical two times a day  gabapentin 300 milliGRAM(s) Oral three times a day  heparin  Injectable 5000 Unit(s) SubCutaneous every 8 hours  lactated ringers. 1000 milliLiter(s) (75 mL/Hr) IV Continuous <Continuous>  melatonin 3 milliGRAM(s) Oral at bedtime  meropenem  IVPB      meropenem  IVPB 2000 milliGRAM(s) IV Intermittent every 8 hours  metoprolol tartrate 12.5 milliGRAM(s) Oral two times a day  pantoprazole    Tablet 40 milliGRAM(s) Oral before breakfast  predniSONE   Tablet 50 milliGRAM(s) Oral daily  simvastatin 20 milliGRAM(s) Oral at bedtime  vancomycin  IVPB      vancomycin  IVPB 1500 milliGRAM(s) IV Intermittent every 12 hours    MEDICATIONS  (PRN):  HYDROmorphone  Injectable 0.5 milliGRAM(s) IV Push every 4 hours PRN Severe Pain (7 - 10)  polyethylene glycol 3350 17 Gram(s) Oral daily PRN Constipation      Vital Signs Last 24 Hrs  T(C): 36.9 (2018 09:52), Max: 38.1 (2018 16:15)  T(F): 98.5 (2018 09:52), Max: 100.6 (2018 16:15)  HR: 71 (2018 09:52) (71 - 102)  BP: 95/59 (2018 09:52) (95/59 - 142/73)  BP(mean): --  RR: 20 (2018 09:52) (20 - 20)  SpO2: 95% (2018 09:52) (95% - 98%)  CAPILLARY BLOOD GLUCOSE        I&O's Summary    2018 07:01  -  2018 07:00  --------------------------------------------------------  IN: 1275 mL / OUT: 150 mL / NET: 1125 mL        PHYSICAL EXAM:  GENERAL: NAD, well-developed, obese  HEAD:  Atraumatic, Normocephalic  EYES: EOMI, PERRLA, conjunctiva and sclera clear  NECK: Supple, No JVD  CHEST/LUNG: Clear to auscultation bilaterally; No wheeze  HEART: Regular rate and rhythm; No murmurs, rubs, or gallops  ABDOMEN: Soft, Nontender, Nondistended; Bowel sounds present  EXTREMITIES:  2+ Peripheral Pulses, No clubbing, cyanosis, or edema  PSYCH: AAOx3  NEUROLOGY: non-focal  SKIN: right hip stage 4 wound    LABS:        138  |  101  |  15  ----------------------------<  63<L>  4.3   |  24  |  0.61    Ca    8.0<L>      2018 09:50  Phos  3.9       Mg     1.6     -13      PT/INR - ( 2018 11:00 )   PT: 33.1 sec;   INR: 2.79 ratio         PTT - ( 2018 10:00 )  PTT:28.2 sec      Urinalysis Basic - ( 2018 14:51 )    Color: Light Yellow / Appearance: Clear / S.039 / pH: x  Gluc: x / Ketone: Negative  / Bili: Negative / Urobili: Negative   Blood: x / Protein: Trace / Nitrite: Negative   Leuk Esterase: Large / RBC: 5 /hpf / WBC 28 /hpf   Sq Epi: x / Non Sq Epi: 2 /hpf / Bacteria: Negative        RADIOLOGY & ADDITIONAL TESTS:    Imaging Personally Reviewed:    Consultant(s) Notes Reviewed:   wound care team    Care Discussed with Consultants/Other Providers:  wound care team

## 2018-11-15 LAB
-  AMIKACIN: SIGNIFICANT CHANGE UP
-  AMIKACIN: SIGNIFICANT CHANGE UP
-  AMOXICILLIN/CLAVULANIC ACID: SIGNIFICANT CHANGE UP
-  AMOXICILLIN/CLAVULANIC ACID: SIGNIFICANT CHANGE UP
-  AMPICILLIN/SULBACTAM: SIGNIFICANT CHANGE UP
-  AMPICILLIN/SULBACTAM: SIGNIFICANT CHANGE UP
-  AMPICILLIN: SIGNIFICANT CHANGE UP
-  AZTREONAM: SIGNIFICANT CHANGE UP
-  AZTREONAM: SIGNIFICANT CHANGE UP
-  CEFAZOLIN: SIGNIFICANT CHANGE UP
-  CEFAZOLIN: SIGNIFICANT CHANGE UP
-  CEFEPIME: SIGNIFICANT CHANGE UP
-  CEFEPIME: SIGNIFICANT CHANGE UP
-  CEFOXITIN: SIGNIFICANT CHANGE UP
-  CEFOXITIN: SIGNIFICANT CHANGE UP
-  CEFTRIAXONE: SIGNIFICANT CHANGE UP
-  CEFTRIAXONE: SIGNIFICANT CHANGE UP
-  CIPROFLOXACIN: SIGNIFICANT CHANGE UP
-  CIPROFLOXACIN: SIGNIFICANT CHANGE UP
-  DAPTOMYCIN: SIGNIFICANT CHANGE UP
-  ERTAPENEM: SIGNIFICANT CHANGE UP
-  ERTAPENEM: SIGNIFICANT CHANGE UP
-  GENTAMICIN: SIGNIFICANT CHANGE UP
-  GENTAMICIN: SIGNIFICANT CHANGE UP
-  IMIPENEM: SIGNIFICANT CHANGE UP
-  LEVOFLOXACIN: SIGNIFICANT CHANGE UP
-  LINEZOLID: SIGNIFICANT CHANGE UP
-  MEROPENEM: SIGNIFICANT CHANGE UP
-  MEROPENEM: SIGNIFICANT CHANGE UP
-  PIPERACILLIN/TAZOBACTAM: SIGNIFICANT CHANGE UP
-  PIPERACILLIN/TAZOBACTAM: SIGNIFICANT CHANGE UP
-  TETRACYCLINE: SIGNIFICANT CHANGE UP
-  TOBRAMYCIN: SIGNIFICANT CHANGE UP
-  TOBRAMYCIN: SIGNIFICANT CHANGE UP
-  TRIMETHOPRIM/SULFAMETHOXAZOLE: SIGNIFICANT CHANGE UP
-  TRIMETHOPRIM/SULFAMETHOXAZOLE: SIGNIFICANT CHANGE UP
-  VANCOMYCIN: SIGNIFICANT CHANGE UP
ANISOCYTOSIS BLD QL: SLIGHT — SIGNIFICANT CHANGE UP
BASOPHILS # BLD AUTO: 0 K/UL — SIGNIFICANT CHANGE UP (ref 0–0.2)
BASOPHILS NFR BLD AUTO: 0 % — SIGNIFICANT CHANGE UP (ref 0–2)
CARDIOLIPIN AB SER-ACNC: NEGATIVE — SIGNIFICANT CHANGE UP
D DIMER BLD IA.RAPID-MCNC: 414 NG/ML DDU — HIGH
DACRYOCYTES BLD QL SMEAR: SLIGHT — SIGNIFICANT CHANGE UP
EOSINOPHIL # BLD AUTO: 0.1 K/UL — SIGNIFICANT CHANGE UP (ref 0–0.5)
EOSINOPHIL NFR BLD AUTO: 0 % — SIGNIFICANT CHANGE UP (ref 0–6)
HCT VFR BLD CALC: 25.1 % — LOW (ref 34.5–45)
HGB BLD-MCNC: 7.9 G/DL — LOW (ref 11.5–15.5)
HYPOCHROMIA BLD QL: SLIGHT — SIGNIFICANT CHANGE UP
LYMPHOCYTES # BLD AUTO: 0.8 K/UL — LOW (ref 1–3.3)
LYMPHOCYTES # BLD AUTO: 6 % — LOW (ref 13–44)
MCHC RBC-ENTMCNC: 26.9 PG — LOW (ref 27–34)
MCHC RBC-ENTMCNC: 31.4 GM/DL — LOW (ref 32–36)
MCV RBC AUTO: 85.6 FL — SIGNIFICANT CHANGE UP (ref 80–100)
METHOD TYPE: SIGNIFICANT CHANGE UP
MICROCYTES BLD QL: SLIGHT — SIGNIFICANT CHANGE UP
MONOCYTES # BLD AUTO: 0.2 K/UL — SIGNIFICANT CHANGE UP (ref 0–0.9)
MONOCYTES NFR BLD AUTO: 2 % — SIGNIFICANT CHANGE UP (ref 2–14)
NEUTROPHILS # BLD AUTO: 8 K/UL — HIGH (ref 1.8–7.4)
NEUTROPHILS NFR BLD AUTO: 91 % — HIGH (ref 43–77)
NEUTS BAND # BLD: 1 % — SIGNIFICANT CHANGE UP (ref 0–8)
OVALOCYTES BLD QL SMEAR: SLIGHT — SIGNIFICANT CHANGE UP
PLAT MORPH BLD: NORMAL — SIGNIFICANT CHANGE UP
PLATELET # BLD AUTO: 141 K/UL — LOW (ref 150–400)
POIKILOCYTOSIS BLD QL AUTO: SLIGHT — SIGNIFICANT CHANGE UP
POLYCHROMASIA BLD QL SMEAR: SLIGHT — SIGNIFICANT CHANGE UP
RBC # BLD: 2.93 M/UL — LOW (ref 3.8–5.2)
RBC # FLD: 18.9 % — HIGH (ref 10.3–14.5)
RBC BLD AUTO: ABNORMAL
VANCOMYCIN TROUGH SERPL-MCNC: 27.5 UG/ML — CRITICAL HIGH (ref 10–20)
WBC # BLD: 9.1 K/UL — SIGNIFICANT CHANGE UP (ref 3.8–10.5)
WBC # FLD AUTO: 9.1 K/UL — SIGNIFICANT CHANGE UP (ref 3.8–10.5)

## 2018-11-15 PROCEDURE — 99223 1ST HOSP IP/OBS HIGH 75: CPT | Mod: GC,25

## 2018-11-15 PROCEDURE — 99232 SBSQ HOSP IP/OBS MODERATE 35: CPT

## 2018-11-15 PROCEDURE — 11100 BX SKIN SUBCUTANEOUS&/MUCOUS MEMBRANE 1 LESION: CPT

## 2018-11-15 RX ADMIN — PANTOPRAZOLE SODIUM 40 MILLIGRAM(S): 20 TABLET, DELAYED RELEASE ORAL at 05:03

## 2018-11-15 RX ADMIN — GABAPENTIN 300 MILLIGRAM(S): 400 CAPSULE ORAL at 15:49

## 2018-11-15 RX ADMIN — HEPARIN SODIUM 5000 UNIT(S): 5000 INJECTION INTRAVENOUS; SUBCUTANEOUS at 05:02

## 2018-11-15 RX ADMIN — BUDESONIDE AND FORMOTEROL FUMARATE DIHYDRATE 2 PUFF(S): 160; 4.5 AEROSOL RESPIRATORY (INHALATION) at 17:54

## 2018-11-15 RX ADMIN — Medication 3 MILLIGRAM(S): at 22:24

## 2018-11-15 RX ADMIN — HYDROMORPHONE HYDROCHLORIDE 0.5 MILLIGRAM(S): 2 INJECTION INTRAMUSCULAR; INTRAVENOUS; SUBCUTANEOUS at 09:58

## 2018-11-15 RX ADMIN — Medication 1 APPLICATION(S): at 22:23

## 2018-11-15 RX ADMIN — Medication 1 APPLICATION(S): at 12:22

## 2018-11-15 RX ADMIN — Medication 12.5 MILLIGRAM(S): at 05:03

## 2018-11-15 RX ADMIN — POLYETHYLENE GLYCOL 3350 17 GRAM(S): 17 POWDER, FOR SOLUTION ORAL at 15:50

## 2018-11-15 RX ADMIN — MEROPENEM 200 MILLIGRAM(S): 1 INJECTION INTRAVENOUS at 22:22

## 2018-11-15 RX ADMIN — HYDROMORPHONE HYDROCHLORIDE 0.5 MILLIGRAM(S): 2 INJECTION INTRAMUSCULAR; INTRAVENOUS; SUBCUTANEOUS at 10:15

## 2018-11-15 RX ADMIN — HYDROMORPHONE HYDROCHLORIDE 0.5 MILLIGRAM(S): 2 INJECTION INTRAMUSCULAR; INTRAVENOUS; SUBCUTANEOUS at 04:22

## 2018-11-15 RX ADMIN — MEROPENEM 200 MILLIGRAM(S): 1 INJECTION INTRAVENOUS at 15:44

## 2018-11-15 RX ADMIN — HEPARIN SODIUM 5000 UNIT(S): 5000 INJECTION INTRAVENOUS; SUBCUTANEOUS at 15:50

## 2018-11-15 RX ADMIN — BUDESONIDE AND FORMOTEROL FUMARATE DIHYDRATE 2 PUFF(S): 160; 4.5 AEROSOL RESPIRATORY (INHALATION) at 05:02

## 2018-11-15 RX ADMIN — GABAPENTIN 300 MILLIGRAM(S): 400 CAPSULE ORAL at 22:24

## 2018-11-15 RX ADMIN — GABAPENTIN 300 MILLIGRAM(S): 400 CAPSULE ORAL at 05:03

## 2018-11-15 RX ADMIN — Medication 1000 MILLIGRAM(S): at 10:15

## 2018-11-15 RX ADMIN — Medication 50 MILLIGRAM(S): at 05:03

## 2018-11-15 RX ADMIN — HEPARIN SODIUM 5000 UNIT(S): 5000 INJECTION INTRAVENOUS; SUBCUTANEOUS at 22:23

## 2018-11-15 RX ADMIN — Medication 12.5 MILLIGRAM(S): at 17:54

## 2018-11-15 RX ADMIN — MEROPENEM 200 MILLIGRAM(S): 1 INJECTION INTRAVENOUS at 04:53

## 2018-11-15 RX ADMIN — SIMVASTATIN 20 MILLIGRAM(S): 20 TABLET, FILM COATED ORAL at 22:24

## 2018-11-15 RX ADMIN — HYDROMORPHONE HYDROCHLORIDE 0.5 MILLIGRAM(S): 2 INJECTION INTRAMUSCULAR; INTRAVENOUS; SUBCUTANEOUS at 04:52

## 2018-11-15 RX ADMIN — Medication 81 MILLIGRAM(S): at 12:23

## 2018-11-15 RX ADMIN — Medication 400 MILLIGRAM(S): at 09:58

## 2018-11-15 NOTE — PROGRESS NOTE ADULT - ASSESSMENT
78y female with hx CAD, AS s/p TAVR, PPM, DVT, IVC filter, pyoderma gangrenosum, and total knee replacement c/b joint infections s/p I&D explantation, spacer placement and complex wound closure by plastic surgery  A/w fevers and malodorous right lateral hip wound, febrile to 39.1, hypotensive, WBC count 17.    She was recently hospitalized with fever and had prior Coag neg staph bacteremias, PICC removed.   She has been on chronic doxy suppression and steroids as per derm.   Pt now with staph bacteremia again and was started on broad spectrum coverage.  blood cult with 1/4 CoNS--?contaminant  wound culture-with mixed GNR roopa    PLAN:    f/u cultures, WBC trend  local wound care as per derm/surgery  cont empiric meropenem for now in view of fever and leukocytosis--now resolved  hold Vanco since levels are supratherapeutic   d/w pt and family at bedside  d/w Dr Fernandez 78y female with hx CAD, AS s/p TAVR, PPM, DVT, IVC filter, pyoderma gangrenosum, and total knee replacement c/b joint infections s/p I&D explantation, spacer placement and complex wound closure by plastic surgery  A/w fevers and malodorous right lateral hip wound, febrile to 39.1, hypotensive, WBC count 17.    She was recently hospitalized with fever and had prior Coag neg staph bacteremias, PICC removed.   She has been on chronic doxy suppression and steroids as per derm.   Pt now with staph bacteremia again and was started on broad spectrum coverage.  blood cult with 1/4 CoNS--?contaminant  wound culture-with mixed GNR roopa    PLAN:    f/u cultures, WBC trend  local wound care as per derm/surgery  cont empiric meropenem for now in view of fever and leukocytosis--now resolved  hold Vanco since levels are supratherapeutic   d/w Dr Fernandez

## 2018-11-15 NOTE — PROGRESS NOTE ADULT - SUBJECTIVE AND OBJECTIVE BOX
CC: f/u for wound infections    Patient is very weak, bedbound, uncomfortable due to pain    REVIEW OF SYSTEMS: low grade fever, limited  All other review of systems negative (Comprehensive ROS)    Antimicrobials Day # 3  meropenem  IVPB      meropenem  IVPB 2000 milliGRAM(s) IV Intermittent every 8 hours  vancomycin  IVPB      vancomycin  IVPB 1500 milliGRAM(s) IV Intermittent every 12 hours      Other Medications Reviewed    Vital Signs Last 24 Hrs  T(C): 36.4 (15 Nov 2018 13:25), Max: 36.7 (2018 18:36)  T(F): 97.6 (15 Nov 2018 13:25), Max: 98 (2018 18:36)  HR: 93 (15 Nov 2018 13:25) (60 - 93)  BP: 90/60 (15 Nov 2018 13:25) (90/60 - 146/72)  BP(mean): --  RR: 19 (15 Nov 2018 13:25) (19 - 20)  SpO2: 100% (15 Nov 2018 13:25) (95% - 100%)  PHYSICAL EXAM:    Eyes:  anicteric, no conjunctival injection, no discharge  Oropharynx: no lesions or injection 	  Neck: supple, without adenopathy  Lungs: diminished at bases  Heart: regular rate and rhythm; no murmur, rubs or gallops  Abdomen: soft, nondistended, nontender  Skin: scattered areas of pigmentation, wound debrided with packing in place  Extremities: + edema  Neurologic: alert, weak  LAB RESULTS:                          7.9    9.1   )-----------( 141      ( 15 Nov 2018 10:39 )             25.1           Urinalysis Basic - ( 2018 14:51 )    Color: Light Yellow / Appearance: Clear / S.039 / pH: x  Gluc: x / Ketone: Negative  / Bili: Negative / Urobili: Negative   Blood: x / Protein: Trace / Nitrite: Negative   Leuk Esterase: Large / RBC: 5 /hpf / WBC 28 /hpf   Sq Epi: x / Non Sq Epi: 2 /hpf / Bacteria: Negative        MICROBIOLOGY REVIEWED:    RADIOLOGY REVIEWED:

## 2018-11-15 NOTE — PROGRESS NOTE ADULT - SUBJECTIVE AND OBJECTIVE BOX
pain controlled    afvss  nad  abdominal wound - healed  RLE  posterior thigh - dressing by wound care  lateral/anterior thigh - mostly healed  knee wound mostly healed, epithelializing, no drainage  remainder of wounds dressed by wound care  5/5 ta/ehl/gcs  silt l4-s1  2+ dp pulse     ros: depressed, addressed by behavioral health

## 2018-11-15 NOTE — CHART NOTE - NSCHARTNOTEFT_GEN_A_CORE
Dermatology Brief Chart Note    Tissue cx from hematoma specimen +P. mirabilis, M. morganii, E. faecalis. Tissue cx from punch bx specimen NGTD. Given polymicrobial growth, suspect colonization of infected hematoma as opposed to primary infection.     W/u for hypercoagulability negative thus far (C3, C4, anticardiolipin AB, homocysteine, PT/INR, PTT). Final H&E biopsy results to follow.    Katie Serra  Dermatology Resident, PGY2 Dermatology Brief Chart Note    Tissue cx from hematoma specimen +P. mirabilis, M. morganii, E. faecalis. Tissue cx from punch bx specimen NGTD. Given polymicrobial growth, suspect colonization of infected hematoma as opposed to primary infection.     W/u for hypercoagulability negative thus far (C3, C4, anticardiolipin AB, homocysteine, PT/INR, PTT). Final H&E biopsy results to follow.    Katie Serra MD  Dermatology Resident PGY2

## 2018-11-15 NOTE — PROGRESS NOTE ADULT - ASSESSMENT
77 year old woman with PMH of CAD s/p stent to LAD, AS s/p TAVR, PPM, DVT (Jan '18) on coumadin, IVC filter, pyoderma gangrenosum, and total knee replacement c/b joint infections s/p I&D explantation, static spacer placement and complex wound closure by plastic surgery presents with fevers and malodorous right lateral hip wound. She was brought from [facility]. She complained of pain in her legs. On presentation she was febrile to 39.1, hypotensive (systolic 80s), WBC count 17, INR 2.6, and lactate 2.0. Of note, it was documented in Oct 2018 that patient has completed course of anticoagulation for provoked DVT. pt's family has refused surgery for this wound.  pt was seen by derm and wound was biopsied.    sepsis  - right hip wound  - cw abx as per ID  - meropenam and vanco  - seen by derm  - follow up biopsy results  - wound care follow up  - follow up cultures and wbc ( now normal)    h/o pyoderma gangrenosum  - cw prednisone    pain  - cw neurontin  - iv dilaudid prn    HLD  - simvastatin    dvt px  - heparin    gi px  - protonix    pt eval

## 2018-11-15 NOTE — PROGRESS NOTE ADULT - ASSESSMENT
Physical Exam   Constitutional:           Neck, bilateral arms, lower back, buttocks, bilateral legs. Pain has been increasing in the past two weeks on the left side of body.  8/10 current pain    Location of Pain: neck, arms back, buttocks, legs    Baltazar 77 year old female who was admitted several weeks ago with aspiration pneumonia and fever with right knee pain. She also had proximal thigh swelling and there was a hematoma of the leg as well. She has a history of PMR on steroids. She also has a history of tavr, AICD andf remote bilateral TKR and was found to have strep bacteremia and a ERIKA was negative and she also had a eschar of the right knee as well. She was placed on iv antibiotics and I understand that she will be completing the antibiotics, daptomycin on this date. She told me that she had anemia in the distant past and was placed on antibiotics. She is being seen by the plastics service as well. A ct of th femur was not remarkable for infection and a ct of the chest done several weeks ago showed bibasilar PNA. She was also noted to have a left renal lesion that is unchanged now since 12/2016.     The consult was requested to see if there is anything we can do for her anemia. The counts on the day of this admission was white cell count of 9.71 hemoglobin 8.4 hematocrit 26.4 MCV 86 and MCHC 31.8 and platelet count of 179171 the diff count was 74 polys 15 lymphs and 7 monos the bun was 9 and the creatinine was 0.6     THe anemia as described is likely from anemia of inflammation. I would send off serum iron tibc and retic count and ferritin with a guaiac. She may be a candidate for epo to reduce transfusion needs, will follow.     Would send off iron studies, serum iron tibc and ferritin  The hemoglobin on 4/7 was 8.1 and iron studies were ordered for the am by me. She may be a candidate for epo if transfusions become a problem.   5/8 the ferritin came back at over 300 and therefore she is not iron deficient and epo can be considered if the hemoglobin drops and is in need of blood support.   5/9 pt looking better and the pt was stable and the hemoglobin was at 8.1.

## 2018-11-15 NOTE — PROGRESS NOTE ADULT - SUBJECTIVE AND OBJECTIVE BOX
Patient is a 78y old  Female who presents with a chief complaint of Wound infection (14 Nov 2018 16:24)      SUBJECTIVE / OVERNIGHT EVENTS:  no new complaints    MEDICATIONS  (STANDING):  aspirin enteric coated 81 milliGRAM(s) Oral daily  buDESOnide 160 MICROgram(s)/formoterol 4.5 MICROgram(s) Inhaler 2 Puff(s) Inhalation two times a day  Dakins Solution - 1/4 Strength 1 Application(s) Topical two times a day  gabapentin 300 milliGRAM(s) Oral three times a day  heparin  Injectable 5000 Unit(s) SubCutaneous every 8 hours  lactated ringers. 1000 milliLiter(s) (75 mL/Hr) IV Continuous <Continuous>  melatonin 3 milliGRAM(s) Oral at bedtime  meropenem  IVPB      meropenem  IVPB 2000 milliGRAM(s) IV Intermittent every 8 hours  metoprolol tartrate 12.5 milliGRAM(s) Oral two times a day  pantoprazole    Tablet 40 milliGRAM(s) Oral before breakfast  predniSONE   Tablet 50 milliGRAM(s) Oral daily  simvastatin 20 milliGRAM(s) Oral at bedtime  vancomycin  IVPB      vancomycin  IVPB 1500 milliGRAM(s) IV Intermittent every 12 hours    MEDICATIONS  (PRN):  HYDROmorphone  Injectable 0.5 milliGRAM(s) IV Push every 4 hours PRN Severe Pain (7 - 10)  polyethylene glycol 3350 17 Gram(s) Oral daily PRN Constipation      Vital Signs Last 24 Hrs  T(C): 36.5 (15 Nov 2018 10:02), Max: 37.2 (14 Nov 2018 13:34)  T(F): 97.7 (15 Nov 2018 10:02), Max: 98.9 (14 Nov 2018 13:34)  HR: 62 (15 Nov 2018 10:02) (60 - 78)  BP: 122/80 (15 Nov 2018 10:02) (93/63 - 146/72)  BP(mean): --  RR: 19 (15 Nov 2018 10:02) (19 - 20)  SpO2: 100% (15 Nov 2018 10:02) (95% - 100%)  CAPILLARY BLOOD GLUCOSE        I&O's Summary    14 Nov 2018 07:01  -  15 Nov 2018 07:00  --------------------------------------------------------  IN: 1960 mL / OUT: 900 mL / NET: 1060 mL        PHYSICAL EXAM:  GENERAL: NAD, well-developed, obese  HEAD:  Atraumatic, Normocephalic  EYES: EOMI, PERRLA, conjunctiva and sclera clear  NECK: Supple, No JVD  CHEST/LUNG: Clear to auscultation bilaterally; No wheeze  HEART: Regular rate and rhythm; No murmurs, rubs, or gallops  ABDOMEN: Soft, Nontender, Nondistended; Bowel sounds present  EXTREMITIES:  2+ Peripheral Pulses, No clubbing, cyanosis, or edema  PSYCH: AAOx3  NEUROLOGY: non-focal  SKIN: stage 4 ulcer right thigh, dressing c/d/i    LABS:                        7.9    9.1   )-----------( 141      ( 15 Nov 2018 10:39 )             25.1           PT/INR - ( 14 Nov 2018 11:00 )   PT: 33.1 sec;   INR: 2.79 ratio                   RADIOLOGY & ADDITIONAL TESTS:    Imaging Personally Reviewed:    Consultant(s) Notes Reviewed:      Care Discussed with Consultants/Other Providers:

## 2018-11-16 LAB
-  AMIKACIN: SIGNIFICANT CHANGE UP
-  AMOXICILLIN/CLAVULANIC ACID: SIGNIFICANT CHANGE UP
-  AMPICILLIN/SULBACTAM: SIGNIFICANT CHANGE UP
-  AMPICILLIN: SIGNIFICANT CHANGE UP
-  AZTREONAM: SIGNIFICANT CHANGE UP
-  CEFAZOLIN: SIGNIFICANT CHANGE UP
-  CEFEPIME: SIGNIFICANT CHANGE UP
-  CEFOXITIN: SIGNIFICANT CHANGE UP
-  CEFTRIAXONE: SIGNIFICANT CHANGE UP
-  CIPROFLOXACIN: SIGNIFICANT CHANGE UP
-  ERTAPENEM: SIGNIFICANT CHANGE UP
-  GENTAMICIN: SIGNIFICANT CHANGE UP
-  IMIPENEM: SIGNIFICANT CHANGE UP
-  LEVOFLOXACIN: SIGNIFICANT CHANGE UP
-  MEROPENEM: SIGNIFICANT CHANGE UP
-  PIPERACILLIN/TAZOBACTAM: SIGNIFICANT CHANGE UP
-  TOBRAMYCIN: SIGNIFICANT CHANGE UP
-  TRIMETHOPRIM/SULFAMETHOXAZOLE: SIGNIFICANT CHANGE UP
ANION GAP SERPL CALC-SCNC: 12 MMOL/L — SIGNIFICANT CHANGE UP (ref 5–17)
BUN SERPL-MCNC: 15 MG/DL — SIGNIFICANT CHANGE UP (ref 7–23)
CALCIUM SERPL-MCNC: 8.5 MG/DL — SIGNIFICANT CHANGE UP (ref 8.4–10.5)
CHLORIDE SERPL-SCNC: 102 MMOL/L — SIGNIFICANT CHANGE UP (ref 96–108)
CO2 SERPL-SCNC: 26 MMOL/L — SIGNIFICANT CHANGE UP (ref 22–31)
CREAT SERPL-MCNC: 0.51 MG/DL — SIGNIFICANT CHANGE UP (ref 0.5–1.3)
GLUCOSE SERPL-MCNC: 106 MG/DL — HIGH (ref 70–99)
HCT VFR BLD CALC: 24 % — LOW (ref 34.5–45)
HGB BLD-MCNC: 8 G/DL — LOW (ref 11.5–15.5)
MCHC RBC-ENTMCNC: 28.1 PG — SIGNIFICANT CHANGE UP (ref 27–34)
MCHC RBC-ENTMCNC: 33.3 GM/DL — SIGNIFICANT CHANGE UP (ref 32–36)
MCV RBC AUTO: 84.3 FL — SIGNIFICANT CHANGE UP (ref 80–100)
METHOD TYPE: SIGNIFICANT CHANGE UP
PLATELET # BLD AUTO: 196 K/UL — SIGNIFICANT CHANGE UP (ref 150–400)
POTASSIUM SERPL-MCNC: 4.1 MMOL/L — SIGNIFICANT CHANGE UP (ref 3.5–5.3)
POTASSIUM SERPL-SCNC: 4.1 MMOL/L — SIGNIFICANT CHANGE UP (ref 3.5–5.3)
PROT C ACT/NOR PPP: 56 % — LOW (ref 74–150)
RBC # BLD: 2.84 M/UL — LOW (ref 3.8–5.2)
RBC # FLD: 18.1 % — HIGH (ref 10.3–14.5)
SODIUM SERPL-SCNC: 140 MMOL/L — SIGNIFICANT CHANGE UP (ref 135–145)
WBC # BLD: 8.4 K/UL — SIGNIFICANT CHANGE UP (ref 3.8–10.5)
WBC # FLD AUTO: 8.4 K/UL — SIGNIFICANT CHANGE UP (ref 3.8–10.5)

## 2018-11-16 RX ORDER — ACETAMINOPHEN 500 MG
1000 TABLET ORAL ONCE
Qty: 0 | Refills: 0 | Status: COMPLETED | OUTPATIENT
Start: 2018-11-16 | End: 2018-11-16

## 2018-11-16 RX ADMIN — HYDROMORPHONE HYDROCHLORIDE 0.5 MILLIGRAM(S): 2 INJECTION INTRAMUSCULAR; INTRAVENOUS; SUBCUTANEOUS at 17:41

## 2018-11-16 RX ADMIN — Medication 12.5 MILLIGRAM(S): at 06:44

## 2018-11-16 RX ADMIN — HYDROMORPHONE HYDROCHLORIDE 0.5 MILLIGRAM(S): 2 INJECTION INTRAMUSCULAR; INTRAVENOUS; SUBCUTANEOUS at 22:36

## 2018-11-16 RX ADMIN — BUDESONIDE AND FORMOTEROL FUMARATE DIHYDRATE 2 PUFF(S): 160; 4.5 AEROSOL RESPIRATORY (INHALATION) at 06:50

## 2018-11-16 RX ADMIN — HEPARIN SODIUM 5000 UNIT(S): 5000 INJECTION INTRAVENOUS; SUBCUTANEOUS at 06:45

## 2018-11-16 RX ADMIN — Medication 400 MILLIGRAM(S): at 01:07

## 2018-11-16 RX ADMIN — GABAPENTIN 300 MILLIGRAM(S): 400 CAPSULE ORAL at 21:28

## 2018-11-16 RX ADMIN — BUDESONIDE AND FORMOTEROL FUMARATE DIHYDRATE 2 PUFF(S): 160; 4.5 AEROSOL RESPIRATORY (INHALATION) at 17:41

## 2018-11-16 RX ADMIN — HYDROMORPHONE HYDROCHLORIDE 0.5 MILLIGRAM(S): 2 INJECTION INTRAMUSCULAR; INTRAVENOUS; SUBCUTANEOUS at 00:03

## 2018-11-16 RX ADMIN — Medication 50 MILLIGRAM(S): at 06:45

## 2018-11-16 RX ADMIN — Medication 81 MILLIGRAM(S): at 14:09

## 2018-11-16 RX ADMIN — SIMVASTATIN 20 MILLIGRAM(S): 20 TABLET, FILM COATED ORAL at 21:28

## 2018-11-16 RX ADMIN — HYDROMORPHONE HYDROCHLORIDE 0.5 MILLIGRAM(S): 2 INJECTION INTRAMUSCULAR; INTRAVENOUS; SUBCUTANEOUS at 20:24

## 2018-11-16 RX ADMIN — Medication 1 APPLICATION(S): at 21:30

## 2018-11-16 RX ADMIN — PANTOPRAZOLE SODIUM 40 MILLIGRAM(S): 20 TABLET, DELAYED RELEASE ORAL at 06:44

## 2018-11-16 RX ADMIN — HYDROMORPHONE HYDROCHLORIDE 0.5 MILLIGRAM(S): 2 INJECTION INTRAMUSCULAR; INTRAVENOUS; SUBCUTANEOUS at 18:04

## 2018-11-16 RX ADMIN — Medication 3 MILLIGRAM(S): at 21:28

## 2018-11-16 RX ADMIN — Medication 100 MILLIGRAM(S): at 17:41

## 2018-11-16 RX ADMIN — MEROPENEM 200 MILLIGRAM(S): 1 INJECTION INTRAVENOUS at 22:05

## 2018-11-16 RX ADMIN — HYDROMORPHONE HYDROCHLORIDE 0.5 MILLIGRAM(S): 2 INJECTION INTRAMUSCULAR; INTRAVENOUS; SUBCUTANEOUS at 00:33

## 2018-11-16 RX ADMIN — GABAPENTIN 300 MILLIGRAM(S): 400 CAPSULE ORAL at 06:45

## 2018-11-16 RX ADMIN — Medication 1000 MILLIGRAM(S): at 01:37

## 2018-11-16 RX ADMIN — MEROPENEM 200 MILLIGRAM(S): 1 INJECTION INTRAVENOUS at 06:45

## 2018-11-16 RX ADMIN — SODIUM CHLORIDE 75 MILLILITER(S): 9 INJECTION, SOLUTION INTRAVENOUS at 21:30

## 2018-11-16 RX ADMIN — GABAPENTIN 300 MILLIGRAM(S): 400 CAPSULE ORAL at 14:10

## 2018-11-16 RX ADMIN — SODIUM CHLORIDE 75 MILLILITER(S): 9 INJECTION, SOLUTION INTRAVENOUS at 14:10

## 2018-11-16 RX ADMIN — BUDESONIDE AND FORMOTEROL FUMARATE DIHYDRATE 2 PUFF(S): 160; 4.5 AEROSOL RESPIRATORY (INHALATION) at 14:10

## 2018-11-16 RX ADMIN — HYDROMORPHONE HYDROCHLORIDE 0.5 MILLIGRAM(S): 2 INJECTION INTRAMUSCULAR; INTRAVENOUS; SUBCUTANEOUS at 14:04

## 2018-11-16 RX ADMIN — Medication 12.5 MILLIGRAM(S): at 17:44

## 2018-11-16 RX ADMIN — HEPARIN SODIUM 5000 UNIT(S): 5000 INJECTION INTRAVENOUS; SUBCUTANEOUS at 14:10

## 2018-11-16 RX ADMIN — HYDROMORPHONE HYDROCHLORIDE 0.5 MILLIGRAM(S): 2 INJECTION INTRAMUSCULAR; INTRAVENOUS; SUBCUTANEOUS at 14:25

## 2018-11-16 RX ADMIN — MEROPENEM 200 MILLIGRAM(S): 1 INJECTION INTRAVENOUS at 14:09

## 2018-11-16 RX ADMIN — HEPARIN SODIUM 5000 UNIT(S): 5000 INJECTION INTRAVENOUS; SUBCUTANEOUS at 21:29

## 2018-11-16 RX ADMIN — Medication 1 APPLICATION(S): at 14:10

## 2018-11-16 NOTE — PHYSICAL THERAPY INITIAL EVALUATION ADULT - PERTINENT HX OF CURRENT PROBLEM, REHAB EVAL
Pt is 78F admitted 11/12/18 PMHx CAD s/p stent to LAD, AS s/p TAVR, PPM, DVT on coumadin, IVC filter, pyoderma gangrenosum & TKR c/b joint infections s/p I&D explantation, static spacer placement & complex wound closure by plastic surgery; p/w fevers & malodorous right lateral hip wound.

## 2018-11-16 NOTE — PROGRESS NOTE ADULT - SUBJECTIVE AND OBJECTIVE BOX
CC: f/u for wound infections    Patient is very weak, bedbound, no new c/o    REVIEW OF SYSTEMS: no fever, limited  All other review of systems negative (Comprehensive ROS)    Antimicrobials Day # 4  meropenem  IVPB      meropenem  IVPB 2000 milliGRAM(s) IV Intermittent every 8 hours        Other Medications Reviewed  Vital Signs Last 24 Hrs  T(C): 36.2 (2018 15:10), Max: 36.8 (15 Nov 2018 21:33)  T(F): 97.1 (2018 15:10), Max: 98.2 (15 Nov 2018 21:33)  HR: 63 (2018 15:10) (62 - 71)  BP: 133/87 (2018 15:10) (101/66 - 133/87)  BP(mean): --  RR: 18 (2018 15:10) (18 - 20)  SpO2: 92% (2018 15:10) (92% - 100%)    PHYSICAL EXAM:    Eyes:  anicteric, no conjunctival injection, no discharge  Oropharynx: no lesions or injection 	  Neck: supple, without adenopathy  Lungs: diminished at bases  Heart: regular rate and rhythm; no murmur, rubs or gallops  Abdomen: soft, nondistended, nontender  Skin: scattered areas of pigmentation, wound debrided with packing in place  Extremities: + edema  Neurologic: alert, weak  LAB RESULTS:                          7.9    9.1   )-----------( 141      ( 15 Nov 2018 10:39 )             25.1           Urinalysis Basic - ( 2018 14:51 )    Color: Light Yellow / Appearance: Clear / S.039 / pH: x  Gluc: x / Ketone: Negative  / Bili: Negative / Urobili: Negative   Blood: x / Protein: Trace / Nitrite: Negative   Leuk Esterase: Large / RBC: 5 /hpf / WBC 28 /hpf   Sq Epi: x / Non Sq Epi: 2 /hpf / Bacteria: Negative        MICROBIOLOGY REVIEWED:    RADIOLOGY REVIEWED:

## 2018-11-16 NOTE — PROGRESS NOTE ADULT - SUBJECTIVE AND OBJECTIVE BOX
Patient is a 78y old  Female who presents with a chief complaint of Wound infection (15 Nov 2018 16:11)      SUBJECTIVE / OVERNIGHT EVENTS:  no new complaints    MEDICATIONS  (STANDING):  aspirin enteric coated 81 milliGRAM(s) Oral daily  buDESOnide 160 MICROgram(s)/formoterol 4.5 MICROgram(s) Inhaler 2 Puff(s) Inhalation two times a day  Dakins Solution - 1/4 Strength 1 Application(s) Topical two times a day  gabapentin 300 milliGRAM(s) Oral three times a day  heparin  Injectable 5000 Unit(s) SubCutaneous every 8 hours  lactated ringers. 1000 milliLiter(s) (75 mL/Hr) IV Continuous <Continuous>  melatonin 3 milliGRAM(s) Oral at bedtime  meropenem  IVPB      meropenem  IVPB 2000 milliGRAM(s) IV Intermittent every 8 hours  metoprolol tartrate 12.5 milliGRAM(s) Oral two times a day  pantoprazole    Tablet 40 milliGRAM(s) Oral before breakfast  predniSONE   Tablet 50 milliGRAM(s) Oral daily  simvastatin 20 milliGRAM(s) Oral at bedtime    MEDICATIONS  (PRN):  HYDROmorphone  Injectable 0.5 milliGRAM(s) IV Push every 4 hours PRN Severe Pain (7 - 10)  polyethylene glycol 3350 17 Gram(s) Oral daily PRN Constipation      Vital Signs Last 24 Hrs  T(C): 36.7 (16 Nov 2018 09:51), Max: 36.8 (15 Nov 2018 21:33)  T(F): 98.1 (16 Nov 2018 09:51), Max: 98.2 (15 Nov 2018 21:33)  HR: 64 (16 Nov 2018 09:51) (62 - 93)  BP: 126/69 (16 Nov 2018 09:51) (90/60 - 127/75)  BP(mean): --  RR: 18 (16 Nov 2018 09:51) (18 - 20)  SpO2: 92% (16 Nov 2018 09:51) (92% - 100%)  CAPILLARY BLOOD GLUCOSE        I&O's Summary    15 Nov 2018 07:01  -  16 Nov 2018 07:00  --------------------------------------------------------  IN: 1520 mL / OUT: 0 mL / NET: 1520 mL    16 Nov 2018 07:01  -  16 Nov 2018 11:31  --------------------------------------------------------  IN: 180 mL / OUT: 0 mL / NET: 180 mL        PHYSICAL EXAM:  GENERAL: NAD, well-developed  HEAD:  Atraumatic, Normocephalic  EYES: EOMI, PERRLA, conjunctiva and sclera clear  NECK: Supple, No JVD  CHEST/LUNG: Clear to auscultation bilaterally; No wheeze  HEART: Regular rate and rhythm; No murmurs, rubs, or gallops  ABDOMEN: Soft, Nontender, Nondistended; Bowel sounds present  EXTREMITIES:  2+ Peripheral Pulses, No clubbing, cyanosis, or edema  PSYCH: AAOx3  NEUROLOGY: non-focal  SKIN: No rashes or lesions    LABS:                        8.0    8.4   )-----------( 196      ( 16 Nov 2018 10:00 )             24.0     11-16    140  |  102  |  15  ----------------------------<  106<H>  4.1   |  26  |  0.51    Ca    8.5      16 Nov 2018 10:00                RADIOLOGY & ADDITIONAL TESTS:    Imaging Personally Reviewed:    Consultant(s) Notes Reviewed:      Care Discussed with Consultants/Other Providers:

## 2018-11-16 NOTE — PHYSICAL THERAPY INITIAL EVALUATION ADULT - PRECAUTIONS/LIMITATIONS, REHAB EVAL
CT ABDOMEN/PELVIS: Limited evaluation of the right-sided subcutaneous soft tissues secondary to artifact from the gantry. Partially imaged subcutaneous edema in this region. No subcutaneous emphysema is visualized.Persistent wall thickening and dilatation of the appendiceal tip for which differential includes chronic inflammation versus focal lesion.Small bilateral pleural effusions and bibasilar atelectasis. XRAY CHEST: The heart is enlarged. Elevated right hemidiaphragm. Left lower lobe pneumonia and/or atelectasis cannot be ruled out entirely.. Status post transaortic valve replacement. A pacer is in good position.

## 2018-11-16 NOTE — PROGRESS NOTE ADULT - ASSESSMENT
78y female with hx CAD, AS s/p TAVR, PPM, DVT, IVC filter, pyoderma gangrenosum, and total knee replacement c/b joint infections s/p I&D explantation, spacer placement and complex wound closure by plastic surgery  A/w fevers and malodorous right lateral hip wound, febrile to 39.1, hypotensive, WBC count 17.    She was recently hospitalized with fever and had prior Coag neg staph bacteremias, PICC removed.   She has been on chronic doxy suppression and steroids as per derm.   Pt now with staph bacteremia again and was started on broad spectrum coverage.  blood cult with 1/4 CoNS--?contaminant  wound culture-with mixed GNR roopa--antibiogram reviewed    PLAN:    f/u cultures, WBC trend  local wound care as per derm/surgery  cont empiric meropenem for now targeting morganella, proteus, and amp sensitive VRE  fever and leukocytosis--now resolved  if/when ready for dc oral Levaquin may be an option for another 5-7 days  local wound care remains most important  resume doxy as chronic suppression

## 2018-11-16 NOTE — PROGRESS NOTE ADULT - ASSESSMENT
77 year old woman with PMH of CAD s/p stent to LAD, AS s/p TAVR, PPM, DVT (Jan '18) on coumadin, IVC filter, pyoderma gangrenosum, and total knee replacement c/b joint infections s/p I&D explantation, static spacer placement and complex wound closure by plastic surgery presents with fevers and malodorous right lateral hip wound. She was brought from [facility]. She complained of pain in her legs. On presentation she was febrile to 39.1, hypotensive (systolic 80s), WBC count 17, INR 2.6, and lactate 2.0. Of note, it was documented in Oct 2018 that patient has completed course of anticoagulation for provoked DVT. pt's family has refused surgery for this wound.  pt was seen by derm and wound was biopsied.    sepsis  - right hip/flak wound  - cw abx as per ID  - meropenam and vanco  - seen by derm  - follow up biopsy results  - wound care follow up  - follow up cultures and wbc ( now normal)    h/o pyoderma gangrenosum  - cw prednisone    pain  - cw neurontin  - iv dilaudid prn    HLD  - simvastatin    dvt px  - heparin    gi px  - protonix    pt eval    d/c planning  - pt wants to go home.  will discuss with  77 year old woman with PMH of CAD s/p stent to LAD, AS s/p TAVR, PPM, DVT (Jan '18) on coumadin, IVC filter, pyoderma gangrenosum, and total knee replacement c/b joint infections s/p I&D explantation, static spacer placement and complex wound closure by plastic surgery presents with fevers and malodorous right lateral hip wound. She was brought from [facility]. She complained of pain in her legs. On presentation she was febrile to 39.1, hypotensive (systolic 80s), WBC count 17, INR 2.6, and lactate 2.0. Of note, it was documented in Oct 2018 that patient has completed course of anticoagulation for provoked DVT. pt's family has refused surgery for this wound.  pt was seen by derm and wound was biopsied.    sepsis  - right hip wound  - cw abx as per ID  - Meropenem and vanco  - seen by derm  - follow up biopsy results  - wound care follow up  - follow up cultures and wbc ( now normal)    h/o pyoderma gangrenosum  - cw prednisone    pain  - cw neurontin  - iv dilaudid prn    HLD  - simvastatin    dvt px  - heparin    gi px  - protonix    pt eval    d/c planning  - pt wants to go home.  will discuss with

## 2018-11-17 LAB
-  AMIKACIN: SIGNIFICANT CHANGE UP
-  AMOXICILLIN/CLAVULANIC ACID: SIGNIFICANT CHANGE UP
-  AMPICILLIN/SULBACTAM: SIGNIFICANT CHANGE UP
-  AMPICILLIN: SIGNIFICANT CHANGE UP
-  AMPICILLIN: SIGNIFICANT CHANGE UP
-  AZTREONAM: SIGNIFICANT CHANGE UP
-  CEFAZOLIN: SIGNIFICANT CHANGE UP
-  CEFEPIME: SIGNIFICANT CHANGE UP
-  CEFOXITIN: SIGNIFICANT CHANGE UP
-  CEFTRIAXONE: SIGNIFICANT CHANGE UP
-  CIPROFLOXACIN: SIGNIFICANT CHANGE UP
-  DAPTOMYCIN: SIGNIFICANT CHANGE UP
-  ERTAPENEM: SIGNIFICANT CHANGE UP
-  GENTAMICIN: SIGNIFICANT CHANGE UP
-  LEVOFLOXACIN: SIGNIFICANT CHANGE UP
-  LEVOFLOXACIN: SIGNIFICANT CHANGE UP
-  LINEZOLID: SIGNIFICANT CHANGE UP
-  MEROPENEM: SIGNIFICANT CHANGE UP
-  PIPERACILLIN/TAZOBACTAM: SIGNIFICANT CHANGE UP
-  TETRACYCLINE: SIGNIFICANT CHANGE UP
-  TOBRAMYCIN: SIGNIFICANT CHANGE UP
-  TRIMETHOPRIM/SULFAMETHOXAZOLE: SIGNIFICANT CHANGE UP
-  VANCOMYCIN: SIGNIFICANT CHANGE UP
ANION GAP SERPL CALC-SCNC: 12 MMOL/L — SIGNIFICANT CHANGE UP (ref 5–17)
ANISOCYTOSIS BLD QL: SLIGHT — SIGNIFICANT CHANGE UP
BASOPHILS # BLD AUTO: 0 K/UL — SIGNIFICANT CHANGE UP (ref 0–0.2)
BASOPHILS NFR BLD AUTO: 0 % — SIGNIFICANT CHANGE UP (ref 0–2)
BUN SERPL-MCNC: 16 MG/DL — SIGNIFICANT CHANGE UP (ref 7–23)
CALCIUM SERPL-MCNC: 8.3 MG/DL — LOW (ref 8.4–10.5)
CHLORIDE SERPL-SCNC: 102 MMOL/L — SIGNIFICANT CHANGE UP (ref 96–108)
CO2 SERPL-SCNC: 27 MMOL/L — SIGNIFICANT CHANGE UP (ref 22–31)
CREAT SERPL-MCNC: 0.48 MG/DL — LOW (ref 0.5–1.3)
CULTURE RESULTS: SIGNIFICANT CHANGE UP
DACRYOCYTES BLD QL SMEAR: SLIGHT — SIGNIFICANT CHANGE UP
ELLIPTOCYTES BLD QL SMEAR: SLIGHT — SIGNIFICANT CHANGE UP
EOSINOPHIL # BLD AUTO: 0 K/UL — SIGNIFICANT CHANGE UP (ref 0–0.5)
EOSINOPHIL NFR BLD AUTO: 0 % — SIGNIFICANT CHANGE UP (ref 0–6)
GLUCOSE SERPL-MCNC: 109 MG/DL — HIGH (ref 70–99)
HCT VFR BLD CALC: 27.9 % — LOW (ref 34.5–45)
HGB BLD-MCNC: 8.3 G/DL — LOW (ref 11.5–15.5)
HYPOCHROMIA BLD QL: SLIGHT — SIGNIFICANT CHANGE UP
LYMPHOCYTES # BLD AUTO: 0.89 K/UL — LOW (ref 1–3.3)
LYMPHOCYTES # BLD AUTO: 12 % — LOW (ref 13–44)
MANUAL SMEAR VERIFICATION: SIGNIFICANT CHANGE UP
MCHC RBC-ENTMCNC: 26.7 PG — LOW (ref 27–34)
MCHC RBC-ENTMCNC: 29.7 GM/DL — LOW (ref 32–36)
MCV RBC AUTO: 89.7 FL — SIGNIFICANT CHANGE UP (ref 80–100)
METHOD TYPE: SIGNIFICANT CHANGE UP
METHOD TYPE: SIGNIFICANT CHANGE UP
MONOCYTES # BLD AUTO: 0.52 K/UL — SIGNIFICANT CHANGE UP (ref 0–0.9)
MONOCYTES NFR BLD AUTO: 7 % — SIGNIFICANT CHANGE UP (ref 2–14)
NEUTROPHILS # BLD AUTO: 5.94 K/UL — SIGNIFICANT CHANGE UP (ref 1.8–7.4)
NEUTROPHILS NFR BLD AUTO: 80 % — HIGH (ref 43–77)
NEUTS HYPERSEG # BLD: PRESENT — SIGNIFICANT CHANGE UP
NRBC # BLD: 0 /100 — SIGNIFICANT CHANGE UP (ref 0–0)
PLAT MORPH BLD: NORMAL — SIGNIFICANT CHANGE UP
PLATELET # BLD AUTO: 255 K/UL — SIGNIFICANT CHANGE UP (ref 150–400)
POLYCHROMASIA BLD QL SMEAR: SLIGHT — SIGNIFICANT CHANGE UP
POTASSIUM SERPL-MCNC: 4.7 MMOL/L — SIGNIFICANT CHANGE UP (ref 3.5–5.3)
POTASSIUM SERPL-SCNC: 4.7 MMOL/L — SIGNIFICANT CHANGE UP (ref 3.5–5.3)
PROMYELOCYTES # FLD: 1 % — HIGH (ref 0–0)
RBC # BLD: 3.11 M/UL — LOW (ref 3.8–5.2)
RBC # FLD: 19.8 % — HIGH (ref 10.3–14.5)
RBC BLD AUTO: ABNORMAL
SODIUM SERPL-SCNC: 141 MMOL/L — SIGNIFICANT CHANGE UP (ref 135–145)
SPECIMEN SOURCE: SIGNIFICANT CHANGE UP
WBC # BLD: 7.42 K/UL — SIGNIFICANT CHANGE UP (ref 3.8–10.5)
WBC # FLD AUTO: 7.42 K/UL — SIGNIFICANT CHANGE UP (ref 3.8–10.5)

## 2018-11-17 RX ORDER — SODIUM CHLORIDE 9 MG/ML
500 INJECTION INTRAMUSCULAR; INTRAVENOUS; SUBCUTANEOUS ONCE
Qty: 0 | Refills: 0 | Status: DISCONTINUED | OUTPATIENT
Start: 2018-11-17 | End: 2018-11-17

## 2018-11-17 RX ORDER — FUROSEMIDE 40 MG
20 TABLET ORAL ONCE
Qty: 0 | Refills: 0 | Status: COMPLETED | OUTPATIENT
Start: 2018-11-17 | End: 2018-11-17

## 2018-11-17 RX ORDER — ACETAMINOPHEN 500 MG
1000 TABLET ORAL ONCE
Qty: 0 | Refills: 0 | Status: COMPLETED | OUTPATIENT
Start: 2018-11-17 | End: 2018-11-18

## 2018-11-17 RX ADMIN — Medication 12.5 MILLIGRAM(S): at 05:29

## 2018-11-17 RX ADMIN — MEROPENEM 200 MILLIGRAM(S): 1 INJECTION INTRAVENOUS at 21:54

## 2018-11-17 RX ADMIN — HYDROMORPHONE HYDROCHLORIDE 0.5 MILLIGRAM(S): 2 INJECTION INTRAMUSCULAR; INTRAVENOUS; SUBCUTANEOUS at 17:11

## 2018-11-17 RX ADMIN — Medication 50 MILLIGRAM(S): at 05:30

## 2018-11-17 RX ADMIN — Medication 100 MILLIGRAM(S): at 17:12

## 2018-11-17 RX ADMIN — BUDESONIDE AND FORMOTEROL FUMARATE DIHYDRATE 2 PUFF(S): 160; 4.5 AEROSOL RESPIRATORY (INHALATION) at 17:12

## 2018-11-17 RX ADMIN — MEROPENEM 200 MILLIGRAM(S): 1 INJECTION INTRAVENOUS at 05:29

## 2018-11-17 RX ADMIN — HEPARIN SODIUM 5000 UNIT(S): 5000 INJECTION INTRAVENOUS; SUBCUTANEOUS at 13:03

## 2018-11-17 RX ADMIN — Medication 20 MILLIGRAM(S): at 12:31

## 2018-11-17 RX ADMIN — HYDROMORPHONE HYDROCHLORIDE 0.5 MILLIGRAM(S): 2 INJECTION INTRAMUSCULAR; INTRAVENOUS; SUBCUTANEOUS at 07:45

## 2018-11-17 RX ADMIN — Medication 81 MILLIGRAM(S): at 12:32

## 2018-11-17 RX ADMIN — PANTOPRAZOLE SODIUM 40 MILLIGRAM(S): 20 TABLET, DELAYED RELEASE ORAL at 05:29

## 2018-11-17 RX ADMIN — GABAPENTIN 300 MILLIGRAM(S): 400 CAPSULE ORAL at 21:36

## 2018-11-17 RX ADMIN — Medication 1 APPLICATION(S): at 12:32

## 2018-11-17 RX ADMIN — HYDROMORPHONE HYDROCHLORIDE 0.5 MILLIGRAM(S): 2 INJECTION INTRAMUSCULAR; INTRAVENOUS; SUBCUTANEOUS at 13:03

## 2018-11-17 RX ADMIN — Medication 12.5 MILLIGRAM(S): at 19:11

## 2018-11-17 RX ADMIN — BUDESONIDE AND FORMOTEROL FUMARATE DIHYDRATE 2 PUFF(S): 160; 4.5 AEROSOL RESPIRATORY (INHALATION) at 05:35

## 2018-11-17 RX ADMIN — HYDROMORPHONE HYDROCHLORIDE 0.5 MILLIGRAM(S): 2 INJECTION INTRAMUSCULAR; INTRAVENOUS; SUBCUTANEOUS at 21:35

## 2018-11-17 RX ADMIN — Medication 3 MILLIGRAM(S): at 21:36

## 2018-11-17 RX ADMIN — HEPARIN SODIUM 5000 UNIT(S): 5000 INJECTION INTRAVENOUS; SUBCUTANEOUS at 21:36

## 2018-11-17 RX ADMIN — HYDROMORPHONE HYDROCHLORIDE 0.5 MILLIGRAM(S): 2 INJECTION INTRAMUSCULAR; INTRAVENOUS; SUBCUTANEOUS at 17:21

## 2018-11-17 RX ADMIN — MEROPENEM 200 MILLIGRAM(S): 1 INJECTION INTRAVENOUS at 14:11

## 2018-11-17 RX ADMIN — HYDROMORPHONE HYDROCHLORIDE 0.5 MILLIGRAM(S): 2 INJECTION INTRAMUSCULAR; INTRAVENOUS; SUBCUTANEOUS at 22:05

## 2018-11-17 RX ADMIN — HEPARIN SODIUM 5000 UNIT(S): 5000 INJECTION INTRAVENOUS; SUBCUTANEOUS at 05:35

## 2018-11-17 RX ADMIN — SIMVASTATIN 20 MILLIGRAM(S): 20 TABLET, FILM COATED ORAL at 21:36

## 2018-11-17 RX ADMIN — Medication 100 MILLIGRAM(S): at 05:29

## 2018-11-17 RX ADMIN — HYDROMORPHONE HYDROCHLORIDE 0.5 MILLIGRAM(S): 2 INJECTION INTRAMUSCULAR; INTRAVENOUS; SUBCUTANEOUS at 12:31

## 2018-11-17 RX ADMIN — Medication 1 APPLICATION(S): at 21:44

## 2018-11-17 RX ADMIN — GABAPENTIN 300 MILLIGRAM(S): 400 CAPSULE ORAL at 13:02

## 2018-11-17 RX ADMIN — GABAPENTIN 300 MILLIGRAM(S): 400 CAPSULE ORAL at 05:29

## 2018-11-17 RX ADMIN — SODIUM CHLORIDE 75 MILLILITER(S): 9 INJECTION, SOLUTION INTRAVENOUS at 21:45

## 2018-11-17 NOTE — PROGRESS NOTE ADULT - SUBJECTIVE AND OBJECTIVE BOX
CC: f/u for wound infections    Patient is very weak, bedbound, no events overnight    REVIEW OF SYSTEMS: no fevers  All other review of systems negative (Comprehensive ROS)    Antimicrobials Day # 5  doxycycline hyclate Capsule 100 milliGRAM(s) Oral every 12 hours  meropenem  IVPB      meropenem  IVPB 2000 milliGRAM(s) IV Intermittent every 8 hours        Other Medications Reviewed    Vital Signs Last 24 Hrs  T(C): 36.4 (15 Nov 2018 13:25), Max: 36.7 (14 Nov 2018 18:36)  T(F): 97.6 (15 Nov 2018 13:25), Max: 98 (14 Nov 2018 18:36)  HR: 93 (15 Nov 2018 13:25) (60 - 93)  BP: 90/60 (15 Nov 2018 13:25) (90/60 - 146/72)  BP(mean): --  RR: 19 (15 Nov 2018 13:25) (19 - 20)  SpO2: 100% (15 Nov 2018 13:25) (95% - 100%)  PHYSICAL EXAM:    Eyes:  anicteric, no conjunctival injection, no discharge  Oropharynx: no lesions or injection 	  Neck: supple, without adenopathy  Lungs: diminished at bases  Heart: regular rate and rhythm; no murmur, rubs or gallops  Abdomen: soft, nondistended, nontender  Skin: scattered areas of pigmentation, wound debrided with packing in place  Extremities: + edema  Neurologic: alert, weak  LAB RESULTS:                                     8.0    8.4   )-----------( 196      ( 16 Nov 2018 10:00 )             24.0   11-16    140  |  102  |  15  ----------------------------<  106<H>  4.1   |  26  |  0.51    Ca    8.5      16 Nov 2018 10:00            MICROBIOLOGY REVIEWED:    RADIOLOGY REVIEWED:

## 2018-11-17 NOTE — CHART NOTE - NSCHARTNOTEFT_GEN_A_CORE
Called by rn for PT with tissue result   tissue rare morgaella morganii growth in fluid media only, proteus mirabilis, entercoccus faecolis  PT currently followed by ID Dr Chan made aware PT currently being treated with meropenem staph bacteremia again and was started on broad spectrum coverage.  Pt with no urine output 6-8 hrs post bladder scan   LAsix 20mg IVP ordered   Bolus ns 500x1   awaiting follow up for further clinical course   Dr Malone aware and agreeable with above   Department of Medicine   LUIS Holloway [facility]-c #[id number]

## 2018-11-17 NOTE — PROGRESS NOTE ADULT - ASSESSMENT
78y female with hx CAD, AS s/p TAVR, PPM, DVT, IVC filter, pyoderma gangrenosum, and total knee replacement c/b joint infections s/p I&D explantation, spacer placement and complex wound closure by plastic surgery  A/w fevers and malodorous right lateral hip wound, febrile to 39.1, hypotensive, WBC count 17.    She was recently hospitalized with fever and had prior Coag neg staph bacteremias, PICC removed.   She has been on chronic doxy suppression and steroids as per derm.   Pt now with staph bacteremia again and was started on broad spectrum coverage.  blood cult with 1/4 CoNS--?contaminant  wound culture-with mixed GNR roopa    PLAN:    f/u cultures, WBC trend  local wound care as per derm/surgery  cont meropenem for now targeting morganella, proteus, and amp sensitive VRE  fever and leukocytosis--now resolved  if/when ready for dc oral Levaquin may be an option for another 5-7 days  local wound care remains most important  doxy restarted as chronic suppression 78y female with hx CAD, AS s/p TAVR, PPM, DVT, IVC filter, pyoderma gangrenosum, and total knee replacement c/b joint infections s/p I&D explantation, spacer placement and complex wound closure by plastic surgery  A/w fevers and malodorous right lateral hip wound, febrile to 39.1, hypotensive, WBC count 17.    She was recently hospitalized with fever and had prior Coag neg staph bacteremias, PICC removed.   She has been on chronic doxy suppression and steroids as per derm.   Pt now with staph bacteremia again and was started on broad spectrum coverage.  blood cult with 1/4 CoNS--?contaminant  wound culture-with mixed GNR roopa    PLAN:    f/u cultures, WBC trend  local wound care as per derm/surgery  cont meropenem for now targeting morganella, proteus, and amp sensitive VRE  fever and leukocytosis--now resolved  if/when ready for dc oral Levaquin may be an option for another 5-7 days  local wound care remains most important  doxy restarted as chronic suppression   d/w Dr Fernandez

## 2018-11-17 NOTE — PROVIDER CONTACT NOTE (CRITICAL VALUE NOTIFICATION) - ACTION/TREATMENT ORDERED:
hold this dose, will call ID regarding evening dose. continue to monitor.
NP aware will address
Narciso Ruth
continue IV antibiotics as ordered
MARSHA Oliveira notified and made aware. No action at this time, keep pt on current regimen. Will continue to monitor.

## 2018-11-17 NOTE — PROGRESS NOTE ADULT - SUBJECTIVE AND OBJECTIVE BOX
Patient is a 78y old  Female who presents with a chief complaint of Wound infection (17 Nov 2018 08:42)      SUBJECTIVE / OVERNIGHT EVENTS:  " I want to go home"    MEDICATIONS  (STANDING):  aspirin enteric coated 81 milliGRAM(s) Oral daily  buDESOnide 160 MICROgram(s)/formoterol 4.5 MICROgram(s) Inhaler 2 Puff(s) Inhalation two times a day  Dakins Solution - 1/4 Strength 1 Application(s) Topical two times a day  doxycycline hyclate Capsule 100 milliGRAM(s) Oral every 12 hours  gabapentin 300 milliGRAM(s) Oral three times a day  heparin  Injectable 5000 Unit(s) SubCutaneous every 8 hours  lactated ringers. 1000 milliLiter(s) (75 mL/Hr) IV Continuous <Continuous>  melatonin 3 milliGRAM(s) Oral at bedtime  meropenem  IVPB      meropenem  IVPB 2000 milliGRAM(s) IV Intermittent every 8 hours  metoprolol tartrate 12.5 milliGRAM(s) Oral two times a day  pantoprazole    Tablet 40 milliGRAM(s) Oral before breakfast  predniSONE   Tablet 50 milliGRAM(s) Oral daily  simvastatin 20 milliGRAM(s) Oral at bedtime    MEDICATIONS  (PRN):  HYDROmorphone  Injectable 0.5 milliGRAM(s) IV Push every 4 hours PRN Severe Pain (7 - 10)  polyethylene glycol 3350 17 Gram(s) Oral daily PRN Constipation      Vital Signs Last 24 Hrs  T(C): 36.8 (17 Nov 2018 09:44), Max: 37 (16 Nov 2018 17:23)  T(F): 98.2 (17 Nov 2018 09:44), Max: 98.6 (16 Nov 2018 17:23)  HR: 61 (17 Nov 2018 09:44) (61 - 70)  BP: 133/62 (17 Nov 2018 09:44) (133/62 - 155/81)  BP(mean): --  RR: 18 (17 Nov 2018 09:44) (18 - 18)  SpO2: 94% (17 Nov 2018 09:44) (92% - 94%)  CAPILLARY BLOOD GLUCOSE        I&O's Summary    16 Nov 2018 07:01  -  17 Nov 2018 07:00  --------------------------------------------------------  IN: 1405 mL / OUT: 900 mL / NET: 505 mL        PHYSICAL EXAM:  GENERAL: NAD, well-developed, obese  HEAD:  Atraumatic, Normocephalic  EYES: EOMI, PERRLA, conjunctiva and sclera clear  NECK: Supple, No JVD  CHEST/LUNG: Clear to auscultation bilaterally; No wheeze  HEART: Regular rate and rhythm; No murmurs, rubs, or gallops  ABDOMEN: Soft, Nontender, Nondistended; Bowel sounds present  EXTREMITIES:  2+ Peripheral Pulses, No clubbing, cyanosis, or edema  PSYCH: AAOx3  NEUROLOGY: generalized weakness  SKIN: No rashes or lesions    LABS:                        8.0    8.4   )-----------( 196      ( 16 Nov 2018 10:00 )             24.0     11-17    141  |  102  |  16  ----------------------------<  109<H>  4.7   |  27  |  0.48<L>    Ca    8.3<L>      17 Nov 2018 09:01                RADIOLOGY & ADDITIONAL TESTS:    Imaging Personally Reviewed:    Consultant(s) Notes Reviewed:      Care Discussed with Consultants/Other Providers:

## 2018-11-17 NOTE — PROGRESS NOTE ADULT - ASSESSMENT
77 year old woman with PMH of CAD s/p stent to LAD, AS s/p TAVR, PPM, DVT (Jan '18) on coumadin, IVC filter, pyoderma gangrenosum, and total knee replacement c/b joint infections s/p I&D explantation, static spacer placement and complex wound closure by plastic surgery presents with fevers and malodorous right lateral hip wound. She was brought from [facility]. She complained of pain in her legs. On presentation she was febrile to 39.1, hypotensive (systolic 80s), WBC count 17, INR 2.6, and lactate 2.0. Of note, it was documented in Oct 2018 that patient has completed course of anticoagulation for provoked DVT. pt's family has refused surgery for this wound.  pt was seen by derm and wound was biopsied.    sepsis  - right hip wound  - cw abx as per ID  - Meropenem   - may be switched to oral levaquin  - seen by derm  - follow up biopsy results  - wound care follow up  - follow up cultures and wbc ( now normal)    h/o pyoderma gangrenosum  - cw prednisone    pain  - cw neurontin  - iv dilaudid prn    HLD  - simvastatin    dvt px  - heparin    gi px  - protonix    pt eval    d/c planning  - pt wants to go home.  will discuss with   - d/c planning

## 2018-11-18 LAB
ANION GAP SERPL CALC-SCNC: 11 MMOL/L — SIGNIFICANT CHANGE UP (ref 5–17)
BUN SERPL-MCNC: 16 MG/DL — SIGNIFICANT CHANGE UP (ref 7–23)
CALCIUM SERPL-MCNC: 8.3 MG/DL — LOW (ref 8.4–10.5)
CHLORIDE SERPL-SCNC: 103 MMOL/L — SIGNIFICANT CHANGE UP (ref 96–108)
CO2 SERPL-SCNC: 28 MMOL/L — SIGNIFICANT CHANGE UP (ref 22–31)
CREAT SERPL-MCNC: 0.45 MG/DL — LOW (ref 0.5–1.3)
CULTURE RESULTS: SIGNIFICANT CHANGE UP
CULTURE RESULTS: SIGNIFICANT CHANGE UP
FIBRINOGEN AG PPP IA-MCNC: 436 MG/DL — HIGH (ref 180–350)
GLUCOSE SERPL-MCNC: 85 MG/DL — SIGNIFICANT CHANGE UP (ref 70–99)
HCT VFR BLD CALC: 28.1 % — LOW (ref 34.5–45)
HGB BLD-MCNC: 8.2 G/DL — LOW (ref 11.5–15.5)
MCHC RBC-ENTMCNC: 26.4 PG — LOW (ref 27–34)
MCHC RBC-ENTMCNC: 29.2 GM/DL — LOW (ref 32–36)
MCV RBC AUTO: 90.4 FL — SIGNIFICANT CHANGE UP (ref 80–100)
ORGANISM # SPEC MICROSCOPIC CNT: SIGNIFICANT CHANGE UP
PLATELET # BLD AUTO: 256 K/UL — SIGNIFICANT CHANGE UP (ref 150–400)
POTASSIUM SERPL-MCNC: 4.2 MMOL/L — SIGNIFICANT CHANGE UP (ref 3.5–5.3)
POTASSIUM SERPL-SCNC: 4.2 MMOL/L — SIGNIFICANT CHANGE UP (ref 3.5–5.3)
PROT S FREE PPP-ACNC: 16 % NORMAL — LOW (ref 60–140)
RBC # BLD: 3.11 M/UL — LOW (ref 3.8–5.2)
RBC # FLD: 20.5 % — HIGH (ref 10.3–14.5)
SODIUM SERPL-SCNC: 142 MMOL/L — SIGNIFICANT CHANGE UP (ref 135–145)
SPECIMEN SOURCE: SIGNIFICANT CHANGE UP
SPECIMEN SOURCE: SIGNIFICANT CHANGE UP
WBC # BLD: 9.88 K/UL — SIGNIFICANT CHANGE UP (ref 3.8–10.5)
WBC # FLD AUTO: 9.88 K/UL — SIGNIFICANT CHANGE UP (ref 3.8–10.5)

## 2018-11-18 PROCEDURE — 99232 SBSQ HOSP IP/OBS MODERATE 35: CPT

## 2018-11-18 RX ORDER — ACETAMINOPHEN 500 MG
650 TABLET ORAL ONCE
Qty: 0 | Refills: 0 | Status: COMPLETED | OUTPATIENT
Start: 2018-11-18 | End: 2018-11-18

## 2018-11-18 RX ORDER — LANOLIN ALCOHOL/MO/W.PET/CERES
5 CREAM (GRAM) TOPICAL AT BEDTIME
Qty: 0 | Refills: 0 | Status: DISCONTINUED | OUTPATIENT
Start: 2018-11-18 | End: 2018-11-22

## 2018-11-18 RX ADMIN — HYDROMORPHONE HYDROCHLORIDE 0.5 MILLIGRAM(S): 2 INJECTION INTRAMUSCULAR; INTRAVENOUS; SUBCUTANEOUS at 15:49

## 2018-11-18 RX ADMIN — HYDROMORPHONE HYDROCHLORIDE 0.5 MILLIGRAM(S): 2 INJECTION INTRAMUSCULAR; INTRAVENOUS; SUBCUTANEOUS at 05:44

## 2018-11-18 RX ADMIN — MEROPENEM 200 MILLIGRAM(S): 1 INJECTION INTRAVENOUS at 14:34

## 2018-11-18 RX ADMIN — Medication 81 MILLIGRAM(S): at 14:35

## 2018-11-18 RX ADMIN — Medication 1 APPLICATION(S): at 14:35

## 2018-11-18 RX ADMIN — HYDROMORPHONE HYDROCHLORIDE 0.5 MILLIGRAM(S): 2 INJECTION INTRAMUSCULAR; INTRAVENOUS; SUBCUTANEOUS at 15:19

## 2018-11-18 RX ADMIN — Medication 100 MILLIGRAM(S): at 18:02

## 2018-11-18 RX ADMIN — HEPARIN SODIUM 5000 UNIT(S): 5000 INJECTION INTRAVENOUS; SUBCUTANEOUS at 05:45

## 2018-11-18 RX ADMIN — Medication 12.5 MILLIGRAM(S): at 05:49

## 2018-11-18 RX ADMIN — HEPARIN SODIUM 5000 UNIT(S): 5000 INJECTION INTRAVENOUS; SUBCUTANEOUS at 14:35

## 2018-11-18 RX ADMIN — Medication 5 MILLIGRAM(S): at 22:59

## 2018-11-18 RX ADMIN — PANTOPRAZOLE SODIUM 40 MILLIGRAM(S): 20 TABLET, DELAYED RELEASE ORAL at 05:46

## 2018-11-18 RX ADMIN — GABAPENTIN 300 MILLIGRAM(S): 400 CAPSULE ORAL at 22:50

## 2018-11-18 RX ADMIN — SODIUM CHLORIDE 75 MILLILITER(S): 9 INJECTION, SOLUTION INTRAVENOUS at 23:04

## 2018-11-18 RX ADMIN — Medication 650 MILLIGRAM(S): at 17:58

## 2018-11-18 RX ADMIN — Medication 50 MILLIGRAM(S): at 05:45

## 2018-11-18 RX ADMIN — Medication 400 MILLIGRAM(S): at 02:55

## 2018-11-18 RX ADMIN — BUDESONIDE AND FORMOTEROL FUMARATE DIHYDRATE 2 PUFF(S): 160; 4.5 AEROSOL RESPIRATORY (INHALATION) at 18:02

## 2018-11-18 RX ADMIN — Medication 100 MILLIGRAM(S): at 05:45

## 2018-11-18 RX ADMIN — Medication 12.5 MILLIGRAM(S): at 18:02

## 2018-11-18 RX ADMIN — MEROPENEM 200 MILLIGRAM(S): 1 INJECTION INTRAVENOUS at 23:03

## 2018-11-18 RX ADMIN — GABAPENTIN 300 MILLIGRAM(S): 400 CAPSULE ORAL at 14:35

## 2018-11-18 RX ADMIN — BUDESONIDE AND FORMOTEROL FUMARATE DIHYDRATE 2 PUFF(S): 160; 4.5 AEROSOL RESPIRATORY (INHALATION) at 05:50

## 2018-11-18 RX ADMIN — Medication 1000 MILLIGRAM(S): at 03:53

## 2018-11-18 RX ADMIN — Medication 1 APPLICATION(S): at 22:50

## 2018-11-18 RX ADMIN — MEROPENEM 200 MILLIGRAM(S): 1 INJECTION INTRAVENOUS at 05:54

## 2018-11-18 RX ADMIN — GABAPENTIN 300 MILLIGRAM(S): 400 CAPSULE ORAL at 05:46

## 2018-11-18 RX ADMIN — SIMVASTATIN 20 MILLIGRAM(S): 20 TABLET, FILM COATED ORAL at 22:50

## 2018-11-18 RX ADMIN — Medication 650 MILLIGRAM(S): at 18:28

## 2018-11-18 RX ADMIN — HEPARIN SODIUM 5000 UNIT(S): 5000 INJECTION INTRAVENOUS; SUBCUTANEOUS at 22:51

## 2018-11-18 NOTE — PROGRESS NOTE ADULT - ASSESSMENT
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 requiring I&D, TKA explant, static spacer placement with complex wound closure by plastic surgery complicated by wound healing problems and development of pyoderma granulosum  now returned with leukocytosis and fevers with new dependent R thigh posterolateral ulceration    refused I&D of thigh infection, continuing with local wound care by wound care team  remainder thigh/abd/knee wounds are managed by PRS/wound care  appreciate derm c/s, pending biopsy results  abx per ID, on vanc/meropenem, after will need to go back on lifelong doxycycline for chronic suppression but defer choice to ID  continue to spend majority of day oob to wheelchair, okay to ambulate but must remain 50% weightbearing on RLE with NO KNEE MOTION allowed, knee immobilizer at all times if OOB  dvt ppx per primary  rec nutrition consult recommended, optimize nutrition for continued wound healing  Minimize narcotics for this patient, she tends to become oversedated  Recommend dc to subacute rehab as opposed to home after this hospitalization, her  and patient lack the necessary support system to take care of her at home and she did not follow up with any of her doctors after her prolonged hospitalization

## 2018-11-18 NOTE — PROVIDER CONTACT NOTE (CRITICAL VALUE NOTIFICATION) - PERSON GIVING RESULT:
Aimee Cerrato
[facility]
Core Lab, Sapna Owen
alex conde
Mitzi RAMIREZ
Pia Leone, Chemistry lab

## 2018-11-18 NOTE — PROVIDER CONTACT NOTE (CRITICAL VALUE NOTIFICATION) - SITUATION
vanco trough 27.5
Tissue Culture 11/13:rare  morganella morganil. growth in fluid media only proteus mirabilis only entercoccus faecalis. vanco resistant
Positive blood culture from 11/12/18, growth in aerobic bottle, gram positive cocci in clusters
Tissue Culture from 11/13/18 showed:  Proteus Mirabillis  Morganella Morganii  Enterococcus faecalis  (Vanco resistance)
moderate gm+ cocci in pairs per low power field, Moderate gm- rods per low power field and few polymorphonuclear leuocytes per low power field

## 2018-11-18 NOTE — PROGRESS NOTE ADULT - ASSESSMENT
77 year old woman with PMH of CAD s/p stent to LAD, AS s/p TAVR, PPM, DVT (Jan '18) on coumadin, IVC filter, pyoderma gangrenosum, and total knee replacement c/b joint infections s/p I&D explantation, static spacer placement and complex wound closure by plastic surgery presents with fevers and malodorous right lateral hip wound. She was brought from [facility]. She complained of pain in her legs. On presentation she was febrile to 39.1, hypotensive (systolic 80s), WBC count 17, INR 2.6, and lactate 2.0. Of note, it was documented in Oct 2018 that patient has completed course of anticoagulation for provoked DVT. pt's family has refused surgery for this wound.  pt was seen by derm and wound was biopsied.    sepsis  - right hip wound  - cw abx as per ID  - Meropenem   - may be switched to oral levaquin  - seen by derm  - follow up biopsy results  - f/u cultures, WBC trend  - local wound care as per derm/surgery  - cont meropenem for now targeting morganella, proteus, and amp sensitive VRE  - fever and leukocytosis--now resolved  - if/when ready for dc oral Levaquin may be an option for another 5-7 days    h/o pyoderma gangrenosum  - cw prednisone    pain  - cw neurontin  - iv dilaudid prn    HLD  - simvastatin    dvt px  - heparin    gi px  - protonix    pt eval    d/c planning  - pt wants to go home.  will discuss with   - d/c planning

## 2018-11-18 NOTE — PROGRESS NOTE ADULT - SUBJECTIVE AND OBJECTIVE BOX
Patient is a 78y old  Female who presents with a chief complaint of Wound infection (17 Nov 2018 09:59)      SUBJECTIVE / OVERNIGHT EVENTS: has decreased urine output yesterday. was started on if fluids.  c/o insomnia    MEDICATIONS  (STANDING):  aspirin enteric coated 81 milliGRAM(s) Oral daily  buDESOnide 160 MICROgram(s)/formoterol 4.5 MICROgram(s) Inhaler 2 Puff(s) Inhalation two times a day  Dakins Solution - 1/4 Strength 1 Application(s) Topical two times a day  doxycycline hyclate Capsule 100 milliGRAM(s) Oral every 12 hours  gabapentin 300 milliGRAM(s) Oral three times a day  heparin  Injectable 5000 Unit(s) SubCutaneous every 8 hours  lactated ringers. 1000 milliLiter(s) (75 mL/Hr) IV Continuous <Continuous>  melatonin 3 milliGRAM(s) Oral at bedtime  meropenem  IVPB      meropenem  IVPB 2000 milliGRAM(s) IV Intermittent every 8 hours  metoprolol tartrate 12.5 milliGRAM(s) Oral two times a day  pantoprazole    Tablet 40 milliGRAM(s) Oral before breakfast  predniSONE   Tablet 50 milliGRAM(s) Oral daily  simvastatin 20 milliGRAM(s) Oral at bedtime    MEDICATIONS  (PRN):  HYDROmorphone  Injectable 0.5 milliGRAM(s) IV Push every 4 hours PRN Severe Pain (7 - 10)  polyethylene glycol 3350 17 Gram(s) Oral daily PRN Constipation      Vital Signs Last 24 Hrs  T(C): 36.7 (18 Nov 2018 05:36), Max: 36.8 (17 Nov 2018 18:15)  T(F): 98 (18 Nov 2018 05:36), Max: 98.3 (17 Nov 2018 18:15)  HR: 61 (18 Nov 2018 05:36) (61 - 80)  BP: 125/56 (18 Nov 2018 05:36) (117/62 - 137/82)  BP(mean): --  RR: 18 (18 Nov 2018 05:36) (18 - 18)  SpO2: 95% (18 Nov 2018 05:36) (93% - 96%)  CAPILLARY BLOOD GLUCOSE        I&O's Summary    17 Nov 2018 07:01  -  18 Nov 2018 07:00  --------------------------------------------------------  IN: 840 mL / OUT: 2050 mL / NET: -1210 mL        PHYSICAL EXAM:  GENERAL: NAD, well-developed  HEAD:  Atraumatic, Normocephalic  EYES: EOMI, PERRLA, conjunctiva and sclera clear  NECK: Supple, No JVD  CHEST/LUNG: Clear to auscultation bilaterally; No wheeze  HEART: Regular rate and rhythm; No murmurs, rubs, or gallops  ABDOMEN: Soft, Nontender, Nondistended; Bowel sounds present  EXTREMITIES:  2+ Peripheral Pulses, No clubbing, cyanosis, or edema  PSYCH: AAOx3  NEUROLOGY: non-focal  SKIN: No rashes or lesions    LABS:                        8.3    7.42  )-----------( 255      ( 17 Nov 2018 10:47 )             27.9     11-18    142  |  103  |  16  ----------------------------<  85  4.2   |  28  |  0.45<L>    Ca    8.3<L>      18 Nov 2018 09:21                RADIOLOGY & ADDITIONAL TESTS:    Imaging Personally Reviewed:    Consultant(s) Notes Reviewed:      Care Discussed with Consultants/Other Providers:

## 2018-11-18 NOTE — PROVIDER CONTACT NOTE (CRITICAL VALUE NOTIFICATION) - TEST AND RESULT REPORTED:
Positive blood culture from 11/12/18, growth in aerobic bottle, gram positive cocci in clusters
Tissue Culture from 11/13/18 showed:  Proteus Mirabillis  Morganella Morganii  Enterococcus faecalis  (Vanco resistance)
Tissue culture
tissue rare morgaella morganii growth in fluid media only, proteus mirabilis, entercoccus faecolis
Tissue Culture 11/13:rare  morganella morganil. growth in fluid media only proteus mirabilis only entercoccus faecalis. vanco resistant
vanco trough 27.5

## 2018-11-19 LAB
ANION GAP SERPL CALC-SCNC: 10 MMOL/L — SIGNIFICANT CHANGE UP (ref 5–17)
BUN SERPL-MCNC: 16 MG/DL — SIGNIFICANT CHANGE UP (ref 7–23)
CALCIUM SERPL-MCNC: 8.5 MG/DL — SIGNIFICANT CHANGE UP (ref 8.4–10.5)
CHLORIDE SERPL-SCNC: 101 MMOL/L — SIGNIFICANT CHANGE UP (ref 96–108)
CO2 SERPL-SCNC: 31 MMOL/L — SIGNIFICANT CHANGE UP (ref 22–31)
CREAT SERPL-MCNC: 0.47 MG/DL — LOW (ref 0.5–1.3)
CRYOGLOB SERPL-MCNC: NEGATIVE — SIGNIFICANT CHANGE UP
GLUCOSE SERPL-MCNC: 142 MG/DL — HIGH (ref 70–99)
HCT VFR BLD CALC: 25.8 % — LOW (ref 34.5–45)
HGB BLD-MCNC: 8.2 G/DL — LOW (ref 11.5–15.5)
MCHC RBC-ENTMCNC: 27.3 PG — SIGNIFICANT CHANGE UP (ref 27–34)
MCHC RBC-ENTMCNC: 32 GM/DL — SIGNIFICANT CHANGE UP (ref 32–36)
MCV RBC AUTO: 85.5 FL — SIGNIFICANT CHANGE UP (ref 80–100)
PLATELET # BLD AUTO: 295 K/UL — SIGNIFICANT CHANGE UP (ref 150–400)
POTASSIUM SERPL-MCNC: 4.1 MMOL/L — SIGNIFICANT CHANGE UP (ref 3.5–5.3)
POTASSIUM SERPL-SCNC: 4.1 MMOL/L — SIGNIFICANT CHANGE UP (ref 3.5–5.3)
RBC # BLD: 3.01 M/UL — LOW (ref 3.8–5.2)
RBC # FLD: 19.9 % — HIGH (ref 10.3–14.5)
SODIUM SERPL-SCNC: 142 MMOL/L — SIGNIFICANT CHANGE UP (ref 135–145)
WBC # BLD: 10.7 K/UL — HIGH (ref 3.8–10.5)
WBC # FLD AUTO: 10.7 K/UL — HIGH (ref 3.8–10.5)

## 2018-11-19 PROCEDURE — 93010 ELECTROCARDIOGRAM REPORT: CPT

## 2018-11-19 PROCEDURE — 99232 SBSQ HOSP IP/OBS MODERATE 35: CPT

## 2018-11-19 RX ORDER — BACITRACIN ZINC 500 UNIT/G
1 OINTMENT IN PACKET (EA) TOPICAL
Qty: 0 | Refills: 0 | Status: DISCONTINUED | OUTPATIENT
Start: 2018-11-19 | End: 2018-11-20

## 2018-11-19 RX ORDER — HYDROMORPHONE HYDROCHLORIDE 2 MG/ML
2 INJECTION INTRAMUSCULAR; INTRAVENOUS; SUBCUTANEOUS EVERY 4 HOURS
Qty: 0 | Refills: 0 | Status: DISCONTINUED | OUTPATIENT
Start: 2018-11-19 | End: 2018-11-22

## 2018-11-19 RX ORDER — ACETAMINOPHEN 500 MG
650 TABLET ORAL EVERY 6 HOURS
Qty: 0 | Refills: 0 | Status: DISCONTINUED | OUTPATIENT
Start: 2018-11-19 | End: 2018-11-22

## 2018-11-19 RX ADMIN — GABAPENTIN 300 MILLIGRAM(S): 400 CAPSULE ORAL at 13:31

## 2018-11-19 RX ADMIN — HEPARIN SODIUM 5000 UNIT(S): 5000 INJECTION INTRAVENOUS; SUBCUTANEOUS at 22:06

## 2018-11-19 RX ADMIN — MEROPENEM 200 MILLIGRAM(S): 1 INJECTION INTRAVENOUS at 22:07

## 2018-11-19 RX ADMIN — HYDROMORPHONE HYDROCHLORIDE 2 MILLIGRAM(S): 2 INJECTION INTRAMUSCULAR; INTRAVENOUS; SUBCUTANEOUS at 22:05

## 2018-11-19 RX ADMIN — SIMVASTATIN 20 MILLIGRAM(S): 20 TABLET, FILM COATED ORAL at 22:05

## 2018-11-19 RX ADMIN — Medication 5 MILLIGRAM(S): at 22:05

## 2018-11-19 RX ADMIN — MEROPENEM 200 MILLIGRAM(S): 1 INJECTION INTRAVENOUS at 06:46

## 2018-11-19 RX ADMIN — Medication 650 MILLIGRAM(S): at 16:23

## 2018-11-19 RX ADMIN — Medication 650 MILLIGRAM(S): at 15:53

## 2018-11-19 RX ADMIN — HEPARIN SODIUM 5000 UNIT(S): 5000 INJECTION INTRAVENOUS; SUBCUTANEOUS at 06:47

## 2018-11-19 RX ADMIN — GABAPENTIN 300 MILLIGRAM(S): 400 CAPSULE ORAL at 22:05

## 2018-11-19 RX ADMIN — Medication 1 APPLICATION(S): at 09:40

## 2018-11-19 RX ADMIN — HYDROMORPHONE HYDROCHLORIDE 0.5 MILLIGRAM(S): 2 INJECTION INTRAMUSCULAR; INTRAVENOUS; SUBCUTANEOUS at 04:06

## 2018-11-19 RX ADMIN — Medication 100 MILLIGRAM(S): at 06:46

## 2018-11-19 RX ADMIN — HYDROMORPHONE HYDROCHLORIDE 0.5 MILLIGRAM(S): 2 INJECTION INTRAMUSCULAR; INTRAVENOUS; SUBCUTANEOUS at 10:20

## 2018-11-19 RX ADMIN — HEPARIN SODIUM 5000 UNIT(S): 5000 INJECTION INTRAVENOUS; SUBCUTANEOUS at 13:31

## 2018-11-19 RX ADMIN — Medication 100 MILLIGRAM(S): at 18:10

## 2018-11-19 RX ADMIN — BUDESONIDE AND FORMOTEROL FUMARATE DIHYDRATE 2 PUFF(S): 160; 4.5 AEROSOL RESPIRATORY (INHALATION) at 18:10

## 2018-11-19 RX ADMIN — HYDROMORPHONE HYDROCHLORIDE 0.5 MILLIGRAM(S): 2 INJECTION INTRAMUSCULAR; INTRAVENOUS; SUBCUTANEOUS at 03:36

## 2018-11-19 RX ADMIN — Medication 12.5 MILLIGRAM(S): at 06:45

## 2018-11-19 RX ADMIN — HYDROMORPHONE HYDROCHLORIDE 0.5 MILLIGRAM(S): 2 INJECTION INTRAMUSCULAR; INTRAVENOUS; SUBCUTANEOUS at 09:40

## 2018-11-19 RX ADMIN — MEROPENEM 200 MILLIGRAM(S): 1 INJECTION INTRAVENOUS at 13:52

## 2018-11-19 RX ADMIN — Medication 1 APPLICATION(S): at 22:06

## 2018-11-19 RX ADMIN — GABAPENTIN 300 MILLIGRAM(S): 400 CAPSULE ORAL at 06:46

## 2018-11-19 RX ADMIN — Medication 12.5 MILLIGRAM(S): at 18:10

## 2018-11-19 RX ADMIN — Medication 650 MILLIGRAM(S): at 23:50

## 2018-11-19 RX ADMIN — HYDROMORPHONE HYDROCHLORIDE 2 MILLIGRAM(S): 2 INJECTION INTRAMUSCULAR; INTRAVENOUS; SUBCUTANEOUS at 22:35

## 2018-11-19 RX ADMIN — PANTOPRAZOLE SODIUM 40 MILLIGRAM(S): 20 TABLET, DELAYED RELEASE ORAL at 06:46

## 2018-11-19 RX ADMIN — Medication 81 MILLIGRAM(S): at 13:31

## 2018-11-19 RX ADMIN — Medication 50 MILLIGRAM(S): at 06:46

## 2018-11-19 RX ADMIN — Medication 650 MILLIGRAM(S): at 23:16

## 2018-11-19 RX ADMIN — BUDESONIDE AND FORMOTEROL FUMARATE DIHYDRATE 2 PUFF(S): 160; 4.5 AEROSOL RESPIRATORY (INHALATION) at 06:46

## 2018-11-19 NOTE — DIETITIAN INITIAL EVALUATION ADULT. - ADHERENCE
poor/Pt reports not following any type of diet or restriction at home. Reports taking Folic Acid, Vitamin C, Multivitamin, Calcium + Vitamin D, Prednisone, and Coumadin PTA.

## 2018-11-19 NOTE — CHART NOTE - NSCHARTNOTEFT_GEN_A_CORE
Patient is a 78y old  Female who presents with a chief complaint of Wound infection (19 Nov 2018 15:38)    Patient c/o chest heaviness L side of chest, associated with anxiety, resolved within 15 minutes      Vital Signs Last 24 Hrs  T(C): 36.4 (19 Nov 2018 18:48), Max: 37.1 (19 Nov 2018 06:44)  T(F): 97.6 (19 Nov 2018 18:48), Max: 98.7 (19 Nov 2018 06:44)  HR: 62 (19 Nov 2018 18:51) (62 - 72)  BP: 150/77 (19 Nov 2018 18:51) (118/74 - 157/87)  BP(mean): --  RR: 18 (19 Nov 2018 18:48) (18 - 19)  SpO2: 95% (19 Nov 2018 18:48) (93% - 97%)      Labs:                          8.2    10.7  )-----------( 295      ( 19 Nov 2018 09:29 )             25.8     11-19    142  |  101  |  16  ----------------------------<  142<H>  4.1   |  31  |  0.47<L>    Ca    8.5      19 Nov 2018 09:08          Radiology:    Physical Exam:  General: WN/WD NAD  Neurology: A&Ox3, nonfocal, BARRERA x 4  Head:  Normocephalic, atraumatic  Respiratory: CTA B/L  CV: RRR, S1S2, no murmur  Abdominal: Soft, NT, ND no palpable mass  MSK: No edema, + peripheral pulses, FROM all 4 extremity    Assessment & Plan:  HPI:  77 year old woman with PMH of CAD s/p stent to LAD, AS s/p TAVR, PPM, DVT (Jan '18) on coumadin, IVC filter, pyoderma gangrenosum, and total knee replacement c/b joint infections s/p I&D explantation, static spacer placement and complex wound closure by plastic surgery presents with fevers and malodorous right lateral hip wound. She was brought from [facility]. She complained of pain in her legs. On presentation she was febrile to 39.1, hypotensive (systolic 80s), WBC count 17, INR 2.6, and lactate 2.0. Of note, it was documented in Oct 2018 that patient has completed course of anticoagulation for provoked DVT. (12 Nov 2018 18:26)    Chest pain probably due to anxiety  EKG w/ no acute changes  troponins Q4 hours x 2 for trending    Spoke w/ Dr. Fernandez and agrees w/ plan of care    >  >  >  >        Follow up with Attending in AM.

## 2018-11-19 NOTE — PROGRESS NOTE ADULT - SUBJECTIVE AND OBJECTIVE BOX
CC: f/u for wound infections    Patient is very weak, bedbound, no new c/o    REVIEW OF SYSTEMS: no fevers  All other review of systems negative (Comprehensive ROS)    Antimicrobials Day # 7  doxycycline hyclate Capsule 100 milliGRAM(s) Oral every 12 hours  meropenem  IVPB      meropenem  IVPB 2000 milliGRAM(s) IV Intermittent every 8 hours          Other Medications Reviewed    Vital Signs Last 24 Hrs  T(C): 36.9 (19 Nov 2018 11:35), Max: 37.1 (19 Nov 2018 06:44)  T(F): 98.4 (19 Nov 2018 11:35), Max: 98.7 (19 Nov 2018 06:44)  HR: 68 (19 Nov 2018 11:35) (62 - 76)  BP: 134/84 (19 Nov 2018 11:35) (118/74 - 157/87)  BP(mean): --  RR: 19 (19 Nov 2018 11:35) (18 - 19)  SpO2: 95% (19 Nov 2018 11:35) (93% - 97%)    PHYSICAL EXAM:    Eyes:  anicteric, no conjunctival injection, no discharge  Oropharynx: no lesions or injection 	  Neck: supple, without adenopathy  Lungs: diminished at bases  Heart: regular rate and rhythm; no murmur, rubs or gallops  Abdomen: soft, nondistended, nontender  Skin: scattered areas of pigmentation, wound debrided with packing in place  Extremities: + edema  Neurologic: alert, weak  LAB RESULTS:                                                8.2    10.7  )-----------( 295      ( 19 Nov 2018 09:29 )             25.8   11-19    142  |  101  |  16  ----------------------------<  142<H>  4.1   |  31  |  0.47<L>    Ca    8.5      19 Nov 2018 09:08            MICROBIOLOGY REVIEWED:    RADIOLOGY REVIEWED:

## 2018-11-19 NOTE — DIETITIAN INITIAL EVALUATION ADULT. - ENERGY NEEDS
Ht: 64 inches Wt: 250 pounds BMI: 42.9 kg/m2 IBW: 120 (+/-10%) 205.3 %IBW  Pertinent information: Pt 79 y/o F with PMH: CAD S/P stent to LVAD, AS S/P TAVR, PPM, DVT on Coumadin, TKR S/P I&D explantation, anxiety, dysphagia, GERD, hiatal hernia, OA, asthma, admitted with wound infection, necrotic right posterior thigh wound, sepsis.    Noted +1 generalized, +2 b/l arm, and +3 b/l leg edema as per flow sheets. Skin: multiple wounds in left lateral and medial thigh, right knee, right medial groin, and right buttocks x3 as per documentation.

## 2018-11-19 NOTE — PROGRESS NOTE ADULT - SUBJECTIVE AND OBJECTIVE BOX
Patient is a 78y old  Female who presents with a chief complaint of Wound infection (19 Nov 2018 10:15)      SUBJECTIVE / OVERNIGHT EVENTS:  No chest pain. No shortness of breath. No complaints. No events overnight.     MEDICATIONS  (STANDING):  aspirin enteric coated 81 milliGRAM(s) Oral daily  buDESOnide 160 MICROgram(s)/formoterol 4.5 MICROgram(s) Inhaler 2 Puff(s) Inhalation two times a day  Dakins Solution - 1/4 Strength 1 Application(s) Topical two times a day  doxycycline hyclate Capsule 100 milliGRAM(s) Oral every 12 hours  gabapentin 300 milliGRAM(s) Oral three times a day  heparin  Injectable 5000 Unit(s) SubCutaneous every 8 hours  lactated ringers. 1000 milliLiter(s) (75 mL/Hr) IV Continuous <Continuous>  melatonin 5 milliGRAM(s) Oral at bedtime  meropenem  IVPB      meropenem  IVPB 2000 milliGRAM(s) IV Intermittent every 8 hours  metoprolol tartrate 12.5 milliGRAM(s) Oral two times a day  pantoprazole    Tablet 40 milliGRAM(s) Oral before breakfast  predniSONE   Tablet 50 milliGRAM(s) Oral daily  simvastatin 20 milliGRAM(s) Oral at bedtime    MEDICATIONS  (PRN):  HYDROmorphone  Injectable 0.5 milliGRAM(s) IV Push every 4 hours PRN Severe Pain (7 - 10)  polyethylene glycol 3350 17 Gram(s) Oral daily PRN Constipation      Vital Signs Last 24 Hrs  T(C): 37.1 (19 Nov 2018 06:44), Max: 37.1 (19 Nov 2018 06:44)  T(F): 98.7 (19 Nov 2018 06:44), Max: 98.7 (19 Nov 2018 06:44)  HR: 63 (19 Nov 2018 06:44) (62 - 76)  BP: 157/87 (19 Nov 2018 06:44) (118/74 - 157/87)  BP(mean): --  RR: 18 (19 Nov 2018 06:44) (18 - 18)  SpO2: 93% (19 Nov 2018 06:44) (92% - 97%)  CAPILLARY BLOOD GLUCOSE        I&O's Summary    18 Nov 2018 07:01  -  19 Nov 2018 07:00  --------------------------------------------------------  IN: 2580 mL / OUT: 1450 mL / NET: 1130 mL        PHYSICAL EXAM:  GENERAL: NAD, well-developed  HEAD:  Atraumatic, Normocephalic  EYES: EOMI, PERRLA, conjunctiva and sclera clear  NECK: Supple, No JVD  CHEST/LUNG: Clear to auscultation bilaterally; No wheeze  HEART: Regular rate and rhythm; No murmurs, rubs, or gallops  ABDOMEN: Soft, Nontender, Nondistended; Bowel sounds present  EXTREMITIES:  2+ Peripheral Pulses, No clubbing, cyanosis, or edema  PSYCH: AAOx3  NEUROLOGY: non-focal  SKIN: right hip wound  LABS:                        8.2    10.7  )-----------( 295      ( 19 Nov 2018 09:29 )             25.8     11-19    142  |  101  |  16  ----------------------------<  142<H>  4.1   |  31  |  0.47<L>    Ca    8.5      19 Nov 2018 09:08                RADIOLOGY & ADDITIONAL TESTS:    Imaging Personally Reviewed:    Consultant(s) Notes Reviewed:      Care Discussed with Consultants/Other Providers:

## 2018-11-19 NOTE — DIETITIAN INITIAL EVALUATION ADULT. - NS AS NUTRI INTERV MEALS SNACK
Recommend change to DASH/TLC diet. Defer diet/fluid consistencies to medical team/SLP recommendations.

## 2018-11-19 NOTE — PROVIDER CONTACT NOTE (OTHER) - BACKGROUND
pt with LR at 75ml/hr
11/12 fevers, multiple wounds
Pt tx from [facility] with fevers and oozing wounds.
transfer from [facility] for multiple wounds, fever
tx from [facility] with multiple wounds

## 2018-11-19 NOTE — PROGRESS NOTE ADULT - SUBJECTIVE AND OBJECTIVE BOX
SUBJECTIVE: Pt seen, chart reviewed.  Dressing changes being done on a daily basis by RN staff    Allergies    amoxicillin (Other)    Intolerances    IV Contrast (Flushing (Skin); Nausea)      aspirin enteric coated 81 milliGRAM(s) Oral daily  doxycycline hyclate Capsule 100 milliGRAM(s) Oral every 12 hours  heparin  Injectable 5000 Unit(s) SubCutaneous every 8 hours  meropenem  IVPB      meropenem  IVPB 2000 milliGRAM(s) IV Intermittent every 8 hours      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  Artificial cardiac pacemaker: 12/25/17  H/O cardiac catheterization: 11/29/16 HERBERT placed on LAD  H/O endoscopy: with dilatation of esophageal stricture 2013  S/P cataract surgery: x2; 3-4yr ago  S/P gastroplasty: 28 yrs ago  S/P cholecystectomy: more than 15 years ago  S/P total knee replacement: left, 15yr ago  S/P total knee replacement: right, 12yr ago  S/P hysterectomy: 24yr ago      HEALTH ISSUES - PROBLEM Dx:          FAMILY HISTORY:  No pertinent family history in first degree relatives      Physical Exam:  Vital Signs Last 24 Hrs  T(C): 37.1 (19 Nov 2018 06:44), Max: 37.1 (19 Nov 2018 06:44)  T(F): 98.7 (19 Nov 2018 06:44), Max: 98.7 (19 Nov 2018 06:44)  HR: 63 (19 Nov 2018 06:44) (62 - 76)  BP: 157/87 (19 Nov 2018 06:44) (118/74 - 157/87)  BP(mean): --  RR: 18 (19 Nov 2018 06:44) (18 - 18)  SpO2: 93% (19 Nov 2018 06:44) (92% - 97%)    was given .5 mg of Dilaudid IV prior to dressing change, but continued to protest throughout dressing change  Right thigh wound has significantly less drainage but remains associated with a deep cavity in close proximity to bone  Wound itself remains necrotic, without odor, or cellulitis  3 other wounds of right lateral, posterior thigh, are FT but are clean  All wounds dressed with Dakin moistened kathrin    # nursing personel required to position patient for dressing change  Wounds of bilateral medial thighs are Ft , but remain relatively superificial    Mandel facies appears less pronounced    ID notes appreciated  Remains non ambulatory, and on 50 mg of Prednisone    remain available    LABS:                        8.2    10.7  )-----------( 295      ( 19 Nov 2018 09:29 )             25.8           Outpatient follow up information provided- [phone]

## 2018-11-19 NOTE — PROGRESS NOTE ADULT - ASSESSMENT
78y female with hx CAD, AS s/p TAVR, PPM, DVT, IVC filter, pyoderma gangrenosum, and total knee replacement c/b joint infections s/p I&D explantation, spacer placement and complex wound closure by plastic surgery  A/w fevers and malodorous right lateral hip wound, febrile to 39.1, hypotensive, WBC count 17.    She was recently hospitalized with fever and had prior Coag neg staph bacteremias, PICC removed.   She has been on chronic doxy suppression and steroids as per derm.   Pt now with staph bacteremia again and was started on broad spectrum coverage.  blood cult with 1/4 CoNS--?contaminant  wound culture-with mixed GNR roopa and VRE--reviewed    PLAN:    local wound care as per derm/surgery  cont meropenem for now targeting morganella, proteus, and amp sensitive VRE  fever and leukocytosis--now resolved  if/when ready for dc oral Levaquin may be an option for another 5-7 days  local wound care remains most important  doxy as chronic suppression for life   plans for dc, possible rehab 78y female with hx CAD, AS s/p TAVR, PPM, DVT, IVC filter, pyoderma gangrenosum, and total knee replacement c/b joint infections s/p I&D explantation, spacer placement and complex wound closure by plastic surgery  A/w fevers and malodorous right lateral hip wound, febrile to 39.1, hypotensive, WBC count 17.    She was recently hospitalized with fever and had prior Coag neg staph bacteremias, PICC removed.   She has been on chronic doxy suppression and steroids as per derm.   Pt now with staph bacteremia again and was started on broad spectrum coverage.  blood cult with 1/4 CoNS--?contaminant  wound culture-with mixed GNR roopa and VRE--reviewed    PLAN:    local wound care as per derm/surgery  cont meropenem for now targeting morganella, proteus, and amp sensitive VRE  fever and leukocytosis--now resolved  if/when ready for dc oral Levaquin may be an option for another 5-7 days  local wound care remains most important  doxy as chronic suppression for life   plans for dc, possible rehab  d/w medicine NP  pain control

## 2018-11-19 NOTE — DIETITIAN INITIAL EVALUATION ADULT. - NS AS NUTRI INTERV ED CONTENT
Stressed the importance of protein intake to help with healing, provided recommendations to optimize PO and protein intake, described no carb Prosource and its nutritional benefits. Provided education on heart healthy and Coumadin nutrition therapy. Recommended limited salt intake. Reviewed foods high in salt and amount of salt recommended per day. Discussed nutrition label reading. Stressed the importance of limited fried foods and saturated fat consumption. Reviewed menu order procedure in hospital. Provided handout at bedside with education provided. Provided education on Coumadin and its interaction with vitamin K - pt reports being aware of interaction. Pt amenable for education. Dietitian remains available.

## 2018-11-19 NOTE — DIETITIAN INITIAL EVALUATION ADULT. - NS FNS WEIGHT CHANGE REASON
Pt denies weight changes PTA, unable to recall UBW. Noted weight as per previous RD notes: (01/24/2018) 265 pounds -> (03/25/2018) 260 pounds -> (04/16/2018) 257.9 pounds -> (06/10/2018) 220.6 pounds -> (10/01/2018) 231 pounds -> (10/23/2018) 227 pounds. Current weight as per flow sheets (11/13) 250 pounds -?accuracy of weight fluctuations likely due to fluid shifts.

## 2018-11-19 NOTE — DIETITIAN INITIAL EVALUATION ADULT. - OTHER INFO
Pt seen for length of stay initial assessment. Pt reports good appetite and PO intake. Reports tolerating current diet  consistency. Pt denies nausea, vomiting, diarrhea, or constipation, reports last BM yesterday (11/18).

## 2018-11-20 LAB
ANION GAP SERPL CALC-SCNC: 12 MMOL/L — SIGNIFICANT CHANGE UP (ref 5–17)
BUN SERPL-MCNC: 16 MG/DL — SIGNIFICANT CHANGE UP (ref 7–23)
CALCIUM SERPL-MCNC: 8.5 MG/DL — SIGNIFICANT CHANGE UP (ref 8.4–10.5)
CHLORIDE SERPL-SCNC: 102 MMOL/L — SIGNIFICANT CHANGE UP (ref 96–108)
CO2 SERPL-SCNC: 29 MMOL/L — SIGNIFICANT CHANGE UP (ref 22–31)
CREAT SERPL-MCNC: 0.46 MG/DL — LOW (ref 0.5–1.3)
GLUCOSE SERPL-MCNC: 110 MG/DL — HIGH (ref 70–99)
HCT VFR BLD CALC: 27.2 % — LOW (ref 34.5–45)
HGB BLD-MCNC: 8.6 G/DL — LOW (ref 11.5–15.5)
MAGNESIUM SERPL-MCNC: 1.6 MG/DL — SIGNIFICANT CHANGE UP (ref 1.6–2.6)
MCHC RBC-ENTMCNC: 26.9 PG — LOW (ref 27–34)
MCHC RBC-ENTMCNC: 31.5 GM/DL — LOW (ref 32–36)
MCV RBC AUTO: 85.4 FL — SIGNIFICANT CHANGE UP (ref 80–100)
PHOSPHATE SERPL-MCNC: 2.4 MG/DL — LOW (ref 2.5–4.5)
PLATELET # BLD AUTO: 382 K/UL — SIGNIFICANT CHANGE UP (ref 150–400)
POTASSIUM SERPL-MCNC: 4.3 MMOL/L — SIGNIFICANT CHANGE UP (ref 3.5–5.3)
POTASSIUM SERPL-SCNC: 4.3 MMOL/L — SIGNIFICANT CHANGE UP (ref 3.5–5.3)
RBC # BLD: 3.18 M/UL — LOW (ref 3.8–5.2)
RBC # FLD: 19.9 % — HIGH (ref 10.3–14.5)
SODIUM SERPL-SCNC: 143 MMOL/L — SIGNIFICANT CHANGE UP (ref 135–145)
TROPONIN T, HIGH SENSITIVITY RESULT: 27 NG/L — SIGNIFICANT CHANGE UP (ref 0–51)
WBC # BLD: 11.2 K/UL — HIGH (ref 3.8–10.5)
WBC # FLD AUTO: 11.2 K/UL — HIGH (ref 3.8–10.5)

## 2018-11-20 RX ORDER — HYDROMORPHONE HYDROCHLORIDE 2 MG/ML
0.5 INJECTION INTRAMUSCULAR; INTRAVENOUS; SUBCUTANEOUS ONCE
Qty: 0 | Refills: 0 | Status: DISCONTINUED | OUTPATIENT
Start: 2018-11-20 | End: 2018-11-20

## 2018-11-20 RX ORDER — CHLORHEXIDINE GLUCONATE 213 G/1000ML
1 SOLUTION TOPICAL
Qty: 0 | Refills: 0 | Status: DISCONTINUED | OUTPATIENT
Start: 2018-11-20 | End: 2018-11-22

## 2018-11-20 RX ADMIN — HEPARIN SODIUM 5000 UNIT(S): 5000 INJECTION INTRAVENOUS; SUBCUTANEOUS at 06:37

## 2018-11-20 RX ADMIN — Medication 12.5 MILLIGRAM(S): at 06:37

## 2018-11-20 RX ADMIN — SIMVASTATIN 20 MILLIGRAM(S): 20 TABLET, FILM COATED ORAL at 22:07

## 2018-11-20 RX ADMIN — HYDROMORPHONE HYDROCHLORIDE 0.5 MILLIGRAM(S): 2 INJECTION INTRAMUSCULAR; INTRAVENOUS; SUBCUTANEOUS at 10:48

## 2018-11-20 RX ADMIN — CHLORHEXIDINE GLUCONATE 1 APPLICATION(S): 213 SOLUTION TOPICAL at 13:52

## 2018-11-20 RX ADMIN — Medication 1 APPLICATION(S): at 10:49

## 2018-11-20 RX ADMIN — HEPARIN SODIUM 5000 UNIT(S): 5000 INJECTION INTRAVENOUS; SUBCUTANEOUS at 22:07

## 2018-11-20 RX ADMIN — Medication 1 APPLICATION(S): at 17:37

## 2018-11-20 RX ADMIN — PANTOPRAZOLE SODIUM 40 MILLIGRAM(S): 20 TABLET, DELAYED RELEASE ORAL at 06:37

## 2018-11-20 RX ADMIN — Medication 100 MILLIGRAM(S): at 17:37

## 2018-11-20 RX ADMIN — HYDROMORPHONE HYDROCHLORIDE 2 MILLIGRAM(S): 2 INJECTION INTRAMUSCULAR; INTRAVENOUS; SUBCUTANEOUS at 07:12

## 2018-11-20 RX ADMIN — Medication 81 MILLIGRAM(S): at 13:53

## 2018-11-20 RX ADMIN — BUDESONIDE AND FORMOTEROL FUMARATE DIHYDRATE 2 PUFF(S): 160; 4.5 AEROSOL RESPIRATORY (INHALATION) at 06:37

## 2018-11-20 RX ADMIN — Medication 1 APPLICATION(S): at 23:05

## 2018-11-20 RX ADMIN — HEPARIN SODIUM 5000 UNIT(S): 5000 INJECTION INTRAVENOUS; SUBCUTANEOUS at 13:53

## 2018-11-20 RX ADMIN — Medication 100 MILLIGRAM(S): at 06:37

## 2018-11-20 RX ADMIN — HYDROMORPHONE HYDROCHLORIDE 2 MILLIGRAM(S): 2 INJECTION INTRAMUSCULAR; INTRAVENOUS; SUBCUTANEOUS at 22:37

## 2018-11-20 RX ADMIN — GABAPENTIN 300 MILLIGRAM(S): 400 CAPSULE ORAL at 13:52

## 2018-11-20 RX ADMIN — HYDROMORPHONE HYDROCHLORIDE 2 MILLIGRAM(S): 2 INJECTION INTRAMUSCULAR; INTRAVENOUS; SUBCUTANEOUS at 12:39

## 2018-11-20 RX ADMIN — BUDESONIDE AND FORMOTEROL FUMARATE DIHYDRATE 2 PUFF(S): 160; 4.5 AEROSOL RESPIRATORY (INHALATION) at 17:37

## 2018-11-20 RX ADMIN — HYDROMORPHONE HYDROCHLORIDE 2 MILLIGRAM(S): 2 INJECTION INTRAMUSCULAR; INTRAVENOUS; SUBCUTANEOUS at 22:07

## 2018-11-20 RX ADMIN — Medication 1 APPLICATION(S): at 06:36

## 2018-11-20 RX ADMIN — MEROPENEM 200 MILLIGRAM(S): 1 INJECTION INTRAVENOUS at 06:37

## 2018-11-20 RX ADMIN — Medication 50 MILLIGRAM(S): at 06:37

## 2018-11-20 RX ADMIN — GABAPENTIN 300 MILLIGRAM(S): 400 CAPSULE ORAL at 06:37

## 2018-11-20 RX ADMIN — GABAPENTIN 300 MILLIGRAM(S): 400 CAPSULE ORAL at 22:07

## 2018-11-20 RX ADMIN — Medication 5 MILLIGRAM(S): at 22:07

## 2018-11-20 RX ADMIN — HYDROMORPHONE HYDROCHLORIDE 0.5 MILLIGRAM(S): 2 INJECTION INTRAMUSCULAR; INTRAVENOUS; SUBCUTANEOUS at 11:18

## 2018-11-20 RX ADMIN — HYDROMORPHONE HYDROCHLORIDE 2 MILLIGRAM(S): 2 INJECTION INTRAMUSCULAR; INTRAVENOUS; SUBCUTANEOUS at 11:45

## 2018-11-20 RX ADMIN — MEROPENEM 200 MILLIGRAM(S): 1 INJECTION INTRAVENOUS at 14:14

## 2018-11-20 RX ADMIN — Medication 12.5 MILLIGRAM(S): at 17:37

## 2018-11-20 NOTE — PROVIDER CONTACT NOTE (OTHER) - RECOMMENDATIONS
bolus IVF? increase IVF rate?
PA notified
KAYLA Christie made aware
Notify Team
To make NP aware. Blood?
pt staight cath and 900 cc was emptied.

## 2018-11-20 NOTE — PROGRESS NOTE ADULT - ASSESSMENT
77 year old woman with PMH of CAD s/p stent to LAD, AS s/p TAVR, PPM, DVT (Jan '18) on coumadin, IVC filter, pyoderma gangrenosum, and total knee replacement c/b joint infections s/p I&D explantation, static spacer placement and complex wound closure by plastic surgery presents with fevers and malodorous right lateral hip wound. She was brought from [facility]. She complained of pain in her legs. On presentation she was febrile to 39.1, hypotensive (systolic 80s), WBC count 17, INR 2.6, and lactate 2.0. Of note, it was documented in Oct 2018 that patient has completed course of anticoagulation for provoked DVT. pt's family has refused surgery for this wound.  pt was seen by derm and wound was biopsied.    sepsis  - right hip wound  - Meropenem and Doxy   - may be switched to oral levaquin On Dc     h/o pyoderma gangrenosum  - cw prednisone    pain  - cw neurontin  - iv dilaudid prn    HLD  - simvastatin    dvt px  - heparin    gi px  - protonix    pt eval    d/c planning  - pt wants to go home.  Hospital bed etc to be delivered.   - d/c planning

## 2018-11-20 NOTE — PROVIDER CONTACT NOTE (OTHER) - ACTION/TREATMENT ORDERED:
NP Yordy made aware, IV removed. Arm elevated. Central line RN Love called to come assess patient for IV placement. IV antibiotics paused at this time. Will continue to monitor.
NP aware, will address
NP aware and states that she will address concerns
Pending PA at bedside, pt safety maintained, call bell in reach, will continue to monitor.
KAYLA liu aware. administer IV tylenol. No further interventions required at this time. Will continue to monitor.
NP Gladis Mckeon at bedside. EKG being performed. Continue to monitor.
Re-check pt in 1 hr. Will continue to monitor.
bladder scan patient in 3 hours.

## 2018-11-20 NOTE — PROGRESS NOTE ADULT - SUBJECTIVE AND OBJECTIVE BOX
CC: f/u for  infected wound  Patient reports  she is feeling great and wants to go home  REVIEW OF SYSTEMS:  All other review of systems negative (Comprehensive ROS)    Antimicrobials Day #  :9/14  doxycycline hyclate Capsule 100 milliGRAM(s) Oral every 12 hours  levoFLOXacin  Tablet 500 milliGRAM(s) Oral every 24 hours    Other Medications Reviewed    T(F): 98.2 (11-20-18 @ 17:00), Max: 98.4 (11-19-18 @ 18:05)  HR: 70 (11-20-18 @ 17:00)  BP: 143/78 (11-20-18 @ 17:00)  RR: 18 (11-20-18 @ 17:00)  SpO2: 94% (11-20-18 @ 17:00)  Wt(kg): --    PHYSICAL EXAM:  General: alert, no acute distress  Eyes:  anicteric, no conjunctival injection, no discharge  Oropharynx: no lesions or injection 	  Neck: supple, without adenopathy  Lungs: clear to auscultation  Heart: regular rate and rhythm; no murmur, rubs or gallops  Abdomen: soft, nondistended, nontender, without mass or organomegaly  Skin: no lesions  Extremities: no clubbing, cyanosis,. right hip wound dressed, no induration, odor or tenderness. knee wound is almost healed  Neurologic: alert, oriented, moves all extremities    LAB RESULTS:                        8.6    11.2  )-----------( 382      ( 20 Nov 2018 09:28 )             27.2     11-20    143  |  102  |  16  ----------------------------<  110<H>  4.3   |  29  |  0.46<L>    Ca    8.5      20 Nov 2018 09:24  Phos  2.4     11-20  Mg     1.6     11-20          MICROBIOLOGY:  RECENT CULTURES:  Culture - Tissue with Gram Stain (11.13.18 @ 17:45)    Gram Stain:   No polymorphonuclear cells seen per low power field  No organisms seen per oil power field    -  Linezolid: S 2    -  Meropenem: S <=1    -  Meropenem: S <=1    -  Levofloxacin: S <=1    -  Levofloxacin: S <=1    -  Imipenem: S <=1    -  Levofloxacin: R >4    -  Gentamicin: S <=1    -  Gentamicin: R >8    -  Tetra/Doxy: R >8    -  Trimethoprim/Sulfamethoxazole: R >2/38    -  Vancomycin: R >16    -  Trimethoprim/Sulfamethoxazole: S <=0.5/9.5    -  Cefazolin: R >16    -  Cefazolin: S <=2    -  Cefoxitin: R >16    -  Cefoxitin: S <=4    -  Aztreonam: S <=4    -  Aztreonam: S <=4    -  Ciprofloxacin: S <=0.5    -  Ciprofloxacin: S <=0.5    -  Ceftriaxone: S <=1 Enterobacter, Citrobacter, and Serratia may develop resistance during prolonged therapy    -  Ceftriaxone: S <=1 Enterobacter, Citrobacter, and Serratia may develop resistance during prolonged therapy    -  Daptomycin: S 2    -  Ertapenem: S <=0.5    -  Ertapenem: S <=0.5    -  Ampicillin: R >16 These ampicillin results predict results for amoxicillin    -  Ampicillin: S <=2 These ampicillin results predict results for amoxicillin    -  Ampicillin: S <=2 Predicts results to ampicillin/sulbactam, amoxacillin-clavulanate and  piperacillin-tazobactam.    -  Ampicillin/Sulbactam: I 16/8    -  Ampicillin/Sulbactam: S <=4/2    -  Amoxicillin/Clavulanic Acid: R >16/8    -  Amoxicillin/Clavulanic Acid: S <=8/4    -  Amikacin: S <=8    -  Amikacin: S <=8    -  Cefepime: S <=2    -  Piperacillin/Tazobactam: S <=8    -  Tobramycin: S <=2    -  Cefepime: S <=2    -  Piperacillin/Tazobactam: S <=8    -  Tobramycin: R >8    Specimen Source: .Tissue Other, Skin    Culture Results:   Rare Morganella morganii  Growth in fluid media only Proteus mirabilis  Growth in fluid media only Enterococcus faecalis (vancomycin resistant)    Organism Identification: Morganella morganii  Proteus mirabilis  Enterococcus faecalis (vancomycin resistant)    Organism: Morganella morganii    Organism: Proteus mirabilis    Organism: Enterococcus faecalis (vancomycin resistant)    Method Type: EDWARD    Method Type: EDWARD    Method Type: EDWARD        RADIOLOGY REVIEWED:  < from: CT Abdomen and Pelvis w/ IV Cont (11.12.18 @ 09:31) >  IMPRESSION:     Limited evaluation of the right-sided subcutaneous soft tissues secondary   to artifact from the gantry. Partially imaged subcutaneous edema in this   region. No subcutaneous emphysema is visualized.    Persistent wall thickening and dilatation of the appendiceal tip for   which differential includes chronic inflammation versus focal lesion.    Small bilateral pleural effusions and bibasilar atelectasis.      < end of copied text >    Assessment:  Patient with history of right tkr infection s/p rudy, rudy space and new spacer on suppressive doxycycline, multiple stays for various infection including pneumonia and picc associated cns bacteremia, pyoderma gangrenosa on prednisone, returned with right hip pyoderma superinfection with severe sepsis now much improved.   Plan: change meropenem to levaquin to finish another 5 days of antibiotics  steroids per derm  local wound care

## 2018-11-20 NOTE — CHART NOTE - NSCHARTNOTEFT_GEN_A_CORE
Based on patients on going issues with deconditioning and generalized weakness secondary to patients diagnosis of sepsis secondary to an open wound. Patient will require a semi electric hospital bed. this is necessary to achieve positioning and elevation not able to obtain in an ordinary bed. Bed pillows and wedges have been tried and ruled out. Patient requires a gel overlay to prevent worsening and new pressure ulcers.

## 2018-11-20 NOTE — PROVIDER CONTACT NOTE (OTHER) - ASSESSMENT
Pt resting in bed, just took night medications including pain medication before doing dressing change, all VSS
Pt has O2 sat on room air of 95%. O2 placed for comfort.  After contact NP pt has relief from chest pain as stated by pt.
Pt has not voided yet this shift.  Pt states she has no urge to go. Pt denies bladder fullness. Bladder scan done showing 301 ml in bladder. VSS.
Pt resting in bed, Pt denies having chills. TEmp 100.7, , /71, RR 20, Spo2 98 on 2lnc.
upon assessment, pt does not have any pain or discomfort. bladder scan showed >900 cc urine in bladder.

## 2018-11-20 NOTE — PROVIDER CONTACT NOTE (OTHER) - SITUATION
Pt's 20g IV in left AC infiltrated.
order to bladder scan at 0800, completed, approx 85ml
pt with 900ml straight cath overnight, no urine at this time. primafit in place, no output in container. incontinence pad dry. bladder scan results in approx 85ml
Pt c/o shortness of breath. Pt c/o chest pain from left sternal border radiating to LUQ.
Pt has not voided yet this shift
Pt temp 100.7
Pt vomited and states that shes "not feeling well"
bladder scan >900

## 2018-11-20 NOTE — PROGRESS NOTE ADULT - SUBJECTIVE AND OBJECTIVE BOX
INTERVAL HPI/OVERNIGHT EVENTS: Seen and examined with HHA by bedised . Spoke to daughter over the phone who was upset that mother was given Dilaudid but no dressing change was done . I feel fine as going home tomorrow.   Vital Signs Last 24 Hrs  T(C): 36.8 (20 Nov 2018 14:30), Max: 36.9 (19 Nov 2018 18:05)  T(F): 98.3 (20 Nov 2018 14:30), Max: 98.4 (19 Nov 2018 18:05)  HR: 66 (20 Nov 2018 14:30) (62 - 66)  BP: 136/77 (20 Nov 2018 14:30) (122/73 - 158/81)  BP(mean): --  RR: 18 (20 Nov 2018 14:30) (18 - 20)  SpO2: 94% (20 Nov 2018 14:30) (94% - 95%)  I&O's Summary    19 Nov 2018 07:01  -  20 Nov 2018 07:00  --------------------------------------------------------  IN: 1420 mL / OUT: 850 mL / NET: 570 mL    20 Nov 2018 07:01  -  20 Nov 2018 17:15  --------------------------------------------------------  IN: 760 mL / OUT: 650 mL / NET: 110 mL      MEDICATIONS  (STANDING):  aspirin enteric coated 81 milliGRAM(s) Oral daily  BACItracin   Ointment 1 Application(s) Topical two times a day  buDESOnide 160 MICROgram(s)/formoterol 4.5 MICROgram(s) Inhaler 2 Puff(s) Inhalation two times a day  chlorhexidine 4% Liquid 1 Application(s) Topical <User Schedule>  Dakins Solution - 1/4 Strength 1 Application(s) Topical two times a day  doxycycline hyclate Capsule 100 milliGRAM(s) Oral every 12 hours  gabapentin 300 milliGRAM(s) Oral three times a day  heparin  Injectable 5000 Unit(s) SubCutaneous every 8 hours  melatonin 5 milliGRAM(s) Oral at bedtime  meropenem  IVPB      meropenem  IVPB 2000 milliGRAM(s) IV Intermittent every 8 hours  metoprolol tartrate 12.5 milliGRAM(s) Oral two times a day  pantoprazole    Tablet 40 milliGRAM(s) Oral before breakfast  predniSONE   Tablet 50 milliGRAM(s) Oral daily  simvastatin 20 milliGRAM(s) Oral at bedtime    MEDICATIONS  (PRN):  acetaminophen   Tablet .. 650 milliGRAM(s) Oral every 6 hours PRN Mild Pain (1 - 3)  HYDROmorphone   Tablet 2 milliGRAM(s) Oral every 4 hours PRN Severe Pain (7 - 10)  polyethylene glycol 3350 17 Gram(s) Oral daily PRN Constipation    LABS:                        8.6    11.2  )-----------( 382      ( 20 Nov 2018 09:28 )             27.2     11-20    143  |  102  |  16  ----------------------------<  110<H>  4.3   |  29  |  0.46<L>    Ca    8.5      20 Nov 2018 09:24  Phos  2.4     11-20  Mg     1.6     11-20          CAPILLARY BLOOD GLUCOSE              REVIEW OF SYSTEMS:  CONSTITUTIONAL: No fever, weight loss, or fatigue  EYES: No eye pain, visual disturbances, or discharge  ENMT:  No difficulty hearing, tinnitus, vertigo; No sinus or throat pain  NECK: No pain or stiffness  RESPIRATORY: No cough, wheezing, chills or hemoptysis; No shortness of breath  CARDIOVASCULAR: No chest pain, palpitations, dizziness, or leg swelling  GASTROINTESTINAL: No abdominal or epigastric pain. No nausea, vomiting, or hematemesis; No diarrhea or constipation. No melena or hematochezia.  GENITOURINARY: No dysuria, frequency, hematuria, or incontinence    Consultant(s) Notes Reviewed:  [x ] YES  [ ] NO    PHYSICAL EXAM:  GENERAL: NAD, Obese ,not in any distress ,  HEAD:  Atraumatic, Normocephalic  NERVOUS SYSTEM:  Alert & Oriented X3,  CHEST/LUNG: Good air entry bilateral with no  rales, rhonchi, wheezing, or rubs  HEART: Regular rate and rhythm; No murmurs, rubs, or gallops  ABDOMEN: Soft, Nontender, Nondistended; Bowel sounds present  EXTREMITIES:  edema  SKIN: Rt hip wound     Care Discussed with Consultants/Other Providers [ x] YES  [ ] NO

## 2018-11-20 NOTE — PROVIDER CONTACT NOTE (OTHER) - DATE AND TIME:
17-Nov-2018 08:25
17-Nov-2018 10:55
12-Nov-2018 23:00
14-Nov-2018 02:15
17-Nov-2018 04:48
19-Nov-2018 18:45
20-Nov-2018 22:00

## 2018-11-21 ENCOUNTER — TRANSCRIPTION ENCOUNTER (OUTPATIENT)
Age: 78
End: 2018-11-21

## 2018-11-21 LAB
ANION GAP SERPL CALC-SCNC: 14 MMOL/L — SIGNIFICANT CHANGE UP (ref 5–17)
BUN SERPL-MCNC: 17 MG/DL — SIGNIFICANT CHANGE UP (ref 7–23)
CALCIUM SERPL-MCNC: 8.6 MG/DL — SIGNIFICANT CHANGE UP (ref 8.4–10.5)
CHLORIDE SERPL-SCNC: 101 MMOL/L — SIGNIFICANT CHANGE UP (ref 96–108)
CO2 SERPL-SCNC: 27 MMOL/L — SIGNIFICANT CHANGE UP (ref 22–31)
CREAT SERPL-MCNC: 0.57 MG/DL — SIGNIFICANT CHANGE UP (ref 0.5–1.3)
DNA PLOIDY SPEC FC-IMP: SIGNIFICANT CHANGE UP
GLUCOSE SERPL-MCNC: 143 MG/DL — HIGH (ref 70–99)
HCT VFR BLD CALC: 27 % — LOW (ref 34.5–45)
HGB BLD-MCNC: 8.7 G/DL — LOW (ref 11.5–15.5)
MCHC RBC-ENTMCNC: 27.5 PG — SIGNIFICANT CHANGE UP (ref 27–34)
MCHC RBC-ENTMCNC: 32.3 GM/DL — SIGNIFICANT CHANGE UP (ref 32–36)
MCV RBC AUTO: 85.3 FL — SIGNIFICANT CHANGE UP (ref 80–100)
PLATELET # BLD AUTO: 381 K/UL — SIGNIFICANT CHANGE UP (ref 150–400)
POTASSIUM SERPL-MCNC: 4.7 MMOL/L — SIGNIFICANT CHANGE UP (ref 3.5–5.3)
POTASSIUM SERPL-SCNC: 4.7 MMOL/L — SIGNIFICANT CHANGE UP (ref 3.5–5.3)
RBC # BLD: 3.16 M/UL — LOW (ref 3.8–5.2)
RBC # FLD: 19.7 % — HIGH (ref 10.3–14.5)
SODIUM SERPL-SCNC: 141 MMOL/L — SIGNIFICANT CHANGE UP (ref 135–145)
WBC # BLD: 10.5 K/UL — SIGNIFICANT CHANGE UP (ref 3.8–10.5)
WBC # FLD AUTO: 10.5 K/UL — SIGNIFICANT CHANGE UP (ref 3.8–10.5)

## 2018-11-21 PROCEDURE — 99232 SBSQ HOSP IP/OBS MODERATE 35: CPT

## 2018-11-21 PROCEDURE — 99233 SBSQ HOSP IP/OBS HIGH 50: CPT

## 2018-11-21 RX ORDER — RIVAROXABAN 15 MG-20MG
10 KIT ORAL DAILY
Qty: 0 | Refills: 0 | Status: DISCONTINUED | OUTPATIENT
Start: 2018-11-21 | End: 2018-11-22

## 2018-11-21 RX ORDER — NYSTATIN CREAM 100000 [USP'U]/G
1 CREAM TOPICAL
Qty: 0 | Refills: 0 | Status: DISCONTINUED | OUTPATIENT
Start: 2018-11-21 | End: 2018-11-22

## 2018-11-21 RX ORDER — RIVAROXABAN 15 MG-20MG
1 KIT ORAL
Qty: 30 | Refills: 0 | OUTPATIENT
Start: 2018-11-21 | End: 2018-12-20

## 2018-11-21 RX ADMIN — HEPARIN SODIUM 5000 UNIT(S): 5000 INJECTION INTRAVENOUS; SUBCUTANEOUS at 06:30

## 2018-11-21 RX ADMIN — Medication 100 MILLIGRAM(S): at 06:21

## 2018-11-21 RX ADMIN — HYDROMORPHONE HYDROCHLORIDE 2 MILLIGRAM(S): 2 INJECTION INTRAMUSCULAR; INTRAVENOUS; SUBCUTANEOUS at 16:25

## 2018-11-21 RX ADMIN — SIMVASTATIN 20 MILLIGRAM(S): 20 TABLET, FILM COATED ORAL at 21:40

## 2018-11-21 RX ADMIN — HYDROMORPHONE HYDROCHLORIDE 2 MILLIGRAM(S): 2 INJECTION INTRAMUSCULAR; INTRAVENOUS; SUBCUTANEOUS at 15:57

## 2018-11-21 RX ADMIN — Medication 1 APPLICATION(S): at 21:41

## 2018-11-21 RX ADMIN — Medication 5 MILLIGRAM(S): at 21:41

## 2018-11-21 RX ADMIN — Medication 100 MILLIGRAM(S): at 18:16

## 2018-11-21 RX ADMIN — BUDESONIDE AND FORMOTEROL FUMARATE DIHYDRATE 2 PUFF(S): 160; 4.5 AEROSOL RESPIRATORY (INHALATION) at 06:21

## 2018-11-21 RX ADMIN — Medication 1 APPLICATION(S): at 11:02

## 2018-11-21 RX ADMIN — Medication 81 MILLIGRAM(S): at 15:57

## 2018-11-21 RX ADMIN — Medication 12.5 MILLIGRAM(S): at 18:16

## 2018-11-21 RX ADMIN — GABAPENTIN 300 MILLIGRAM(S): 400 CAPSULE ORAL at 15:57

## 2018-11-21 RX ADMIN — Medication 50 MILLIGRAM(S): at 06:21

## 2018-11-21 RX ADMIN — Medication 650 MILLIGRAM(S): at 10:10

## 2018-11-21 RX ADMIN — HYDROMORPHONE HYDROCHLORIDE 2 MILLIGRAM(S): 2 INJECTION INTRAMUSCULAR; INTRAVENOUS; SUBCUTANEOUS at 06:21

## 2018-11-21 RX ADMIN — GABAPENTIN 300 MILLIGRAM(S): 400 CAPSULE ORAL at 06:21

## 2018-11-21 RX ADMIN — CHLORHEXIDINE GLUCONATE 1 APPLICATION(S): 213 SOLUTION TOPICAL at 11:02

## 2018-11-21 RX ADMIN — GABAPENTIN 300 MILLIGRAM(S): 400 CAPSULE ORAL at 21:40

## 2018-11-21 RX ADMIN — HYDROMORPHONE HYDROCHLORIDE 2 MILLIGRAM(S): 2 INJECTION INTRAMUSCULAR; INTRAVENOUS; SUBCUTANEOUS at 11:20

## 2018-11-21 RX ADMIN — Medication 650 MILLIGRAM(S): at 09:47

## 2018-11-21 RX ADMIN — Medication 12.5 MILLIGRAM(S): at 06:21

## 2018-11-21 RX ADMIN — HYDROMORPHONE HYDROCHLORIDE 2 MILLIGRAM(S): 2 INJECTION INTRAMUSCULAR; INTRAVENOUS; SUBCUTANEOUS at 20:57

## 2018-11-21 RX ADMIN — HEPARIN SODIUM 5000 UNIT(S): 5000 INJECTION INTRAVENOUS; SUBCUTANEOUS at 15:59

## 2018-11-21 RX ADMIN — HYDROMORPHONE HYDROCHLORIDE 2 MILLIGRAM(S): 2 INJECTION INTRAMUSCULAR; INTRAVENOUS; SUBCUTANEOUS at 21:27

## 2018-11-21 RX ADMIN — HYDROMORPHONE HYDROCHLORIDE 2 MILLIGRAM(S): 2 INJECTION INTRAMUSCULAR; INTRAVENOUS; SUBCUTANEOUS at 11:02

## 2018-11-21 RX ADMIN — NYSTATIN CREAM 1 APPLICATION(S): 100000 CREAM TOPICAL at 06:22

## 2018-11-21 RX ADMIN — BUDESONIDE AND FORMOTEROL FUMARATE DIHYDRATE 2 PUFF(S): 160; 4.5 AEROSOL RESPIRATORY (INHALATION) at 18:16

## 2018-11-21 RX ADMIN — HYDROMORPHONE HYDROCHLORIDE 2 MILLIGRAM(S): 2 INJECTION INTRAMUSCULAR; INTRAVENOUS; SUBCUTANEOUS at 06:51

## 2018-11-21 RX ADMIN — PANTOPRAZOLE SODIUM 40 MILLIGRAM(S): 20 TABLET, DELAYED RELEASE ORAL at 06:22

## 2018-11-21 RX ADMIN — NYSTATIN CREAM 1 APPLICATION(S): 100000 CREAM TOPICAL at 18:19

## 2018-11-21 NOTE — DISCHARGE NOTE ADULT - HOSPITAL COURSE
77 year old woman with PMH of CAD s/p stent to LAD, AS s/p TAVR, PPM, DVT (Jan '18) on coumadin, IVC filter, pyoderma gangrenosum, and total knee replacement c/b joint infections s/p I&D explantation, static spacer placement and complex wound closure by plastic surgery presents from [facility] with fevers and malodorous right lateral hip wound, admitted with necrotic right posterior thigh wound.On presentation she was febrile to 39.1, hypotensive (systolic 80s), WBC count 17, INR 2.6, and lactate 2.0. Started on Vanco & Meropenem empirically as per ID for suspected MRSE bacteremia, changed to Meropenem for now morganella, proteus, and amp sensitive VRE, exam with no fluctuance to suggest abscess and there is no crepitus to suggest NSTI. Pt initially planned for sx, but family decided not to proceed with operative treatment after plan discussed by Sx team. Plastics consulted for right knee and right medial thigh wounds, dressing changes as per recommendations. Pt seen by Derm for pyoderma gangrenosum restarted pt on steroid taper, underwent 4mm punch biopsy x 3 performed of R thigh ulcer & hematoma on 11/13/18. Pt with clinical improvement, sepsis resolved, plan to d/c to complete course of antibiotic-Levaquin, will remain on lifelong Doxycycline due to chronic immunosupression, f/u with Derm while on steroid taper, Wound care for management of wounds, PCP & Cards for routine f/u.

## 2018-11-21 NOTE — DISCHARGE NOTE ADULT - PATIENT PORTAL LINK FT
You can access the [facility], offered by [facility], by registering with the following website: [email or web address]

## 2018-11-21 NOTE — DISCHARGE NOTE ADULT - PLAN OF CARE
Meeting sepsis criteria, now resolved -Right thigh wound has significantly less drainage but remains associated with a deep cavity in close proximity to bone, continue antibiotics until course is complete.  -Wound itself remains necrotic, without odor, or cellulitis: continue antibiotics until course is complete.  -3 other wounds of right lateral, posterior thigh, are clean  -Continue to dress wounds with Dakin moistened kathrin  -[facility] in one [facility] (address and information below) Seen by Christina, plan to taper Prednisone as follows;  Start 45mg tomorrow 11/23 and taper down 5mg every 7 days until completed  Follow-up with Derm outpatient Continue pain management as recommended Continue current medication therapy as directed. Routine PCP & Cardiology f/u as advised. Brief episode of CP during hospitalization, ischemic w/u negative, CP resolved, likely anxiety  Coronary artery disease is a condition where the arteries the supply the heart muscle get clogged with fatty deposits & puts you at risk for a heart attack  Call your doctor if you have any new pain, pressure, or discomfort in the center of your chest, pain, tingling or discomfort in arms, back, neck, jaw, or stomach, shortness of breath, nausea, vomiting, burping or heartburn, sweating, cold and clammy skin, racing or abnormal heartbeat for more than 10 minutes or if they keep coming & going.  Call 911 and do not tr to get to hospital by care  You can help yourself with lifestyle changes (quitting smoking if you smoke), eat lots of fruits & vegetables & low fat dairy products, not a lot of meat & fatty foods, walk or some form of physical activity most days of the week, lose weight if you are overweight  Take your cardiac medication as prescribed to lower cholesterol, to lower blood pressure, aspirin to prevent blood clots, and diabetes control  Make sure to keep appointments with Cardiologist-Dr. Roblero, for cardiac follow up care

## 2018-11-21 NOTE — DISCHARGE NOTE ADULT - MEDICATION SUMMARY - MEDICATIONS TO STOP TAKING
I will STOP taking the medications listed below when I get home from the hospital:    Coumadin 1 mg oral tablet  -- 2 tab(s) by mouth once a day (at bedtime)  adjust dose to keep INR between 2-3    traMADol 50 mg oral tablet  -- 0.5 tab(s) by mouth 3 times a day, As needed, Moderate Pain (4 - 6)

## 2018-11-21 NOTE — PROGRESS NOTE ADULT - ASSESSMENT
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 requiring I&D, TKA explant, static spacer placement with complex wound closure by plastic surgery complicated by wound healing problems and development of pyoderma granulosum  now returned with leukocytosis and fevers with new dependent R thigh posterolateral ulceration    refused I&D of thigh infection, continuing with local wound care by wound care team  remainder thigh/abd/knee wounds are managed by PRS/wound care  appreciate derm c/s, pending biopsy results  abx per ID, on lifelong doxycycline for chronic suppression but defer choice to ID, remainder of abx per ID  please consult physical therapy, should spend majority of day oob to wheelchair, okay to ambulate but must remain 50% weightbearing on RLE with NO KNEE MOTION allowed, knee immobilizer at all times if OOB  dvt ppx per primary  rec nutrition consult recommended, optimize nutrition for continued wound healing  Minimize narcotics for this patient, she tends to become oversedated    **Recommend behavioral health involvement as more depressed given prolonged hospitalization, patient in agreement    **I highly recommend transfer to SNF instead of home. Patient requests discharge to home but she has failed her prior dc to home as she lacks the resources needed to mobilize at home and her  is unable to adequately take care of her. Needs more time to work with PT at SNF as she is decompensated. I spoke with her  Yonatan at about this on the telephone today and he is in agreement with the above. He does not feel that he can take care of her. She is high risk for readmission if she is discharged direct to home at this time in my opinion.

## 2018-11-21 NOTE — PROGRESS NOTE ADULT - SUBJECTIVE AND OBJECTIVE BOX
Patient is a 78y old  Female who presents with a chief complaint of Wound infection (21 Nov 2018 14:45)      SUBJECTIVE / OVERNIGHT EVENTS:  No chest pain. No shortness of breath. No complaints. No events overnight.     MEDICATIONS  (STANDING):  aspirin enteric coated 81 milliGRAM(s) Oral daily  buDESOnide 160 MICROgram(s)/formoterol 4.5 MICROgram(s) Inhaler 2 Puff(s) Inhalation two times a day  chlorhexidine 4% Liquid 1 Application(s) Topical <User Schedule>  Dakins Solution - 1/4 Strength 1 Application(s) Topical two times a day  doxycycline hyclate Capsule 100 milliGRAM(s) Oral every 12 hours  gabapentin 300 milliGRAM(s) Oral three times a day  heparin  Injectable 5000 Unit(s) SubCutaneous every 8 hours  levoFLOXacin  Tablet 500 milliGRAM(s) Oral every 24 hours  melatonin 5 milliGRAM(s) Oral at bedtime  metoprolol tartrate 12.5 milliGRAM(s) Oral two times a day  nystatin Powder 1 Application(s) Topical two times a day  pantoprazole    Tablet 40 milliGRAM(s) Oral before breakfast  predniSONE   Tablet 50 milliGRAM(s) Oral daily  simvastatin 20 milliGRAM(s) Oral at bedtime    MEDICATIONS  (PRN):  acetaminophen   Tablet .. 650 milliGRAM(s) Oral every 6 hours PRN Mild Pain (1 - 3)  HYDROmorphone   Tablet 2 milliGRAM(s) Oral every 4 hours PRN Severe Pain (7 - 10)  polyethylene glycol 3350 17 Gram(s) Oral daily PRN Constipation      Vital Signs Last 24 Hrs  T(C): 36.5 (21 Nov 2018 09:57), Max: 36.8 (20 Nov 2018 17:00)  T(F): 97.7 (21 Nov 2018 09:57), Max: 98.2 (20 Nov 2018 17:00)  HR: 64 (21 Nov 2018 09:57) (61 - 70)  BP: 144/76 (21 Nov 2018 09:57) (143/78 - 166/78)  BP(mean): --  RR: 18 (21 Nov 2018 09:57) (18 - 18)  SpO2: 98% (21 Nov 2018 09:57) (94% - 98%)  CAPILLARY BLOOD GLUCOSE        I&O's Summary    20 Nov 2018 07:01  -  21 Nov 2018 07:00  --------------------------------------------------------  IN: 1580 mL / OUT: 1150 mL / NET: 430 mL    21 Nov 2018 07:01  -  21 Nov 2018 15:04  --------------------------------------------------------  IN: 240 mL / OUT: 550 mL / NET: -310 mL        PHYSICAL EXAM:  GENERAL: NAD, well-developed  HEAD:  Atraumatic, Normocephalic  EYES: EOMI, PERRLA, conjunctiva and sclera clear  NECK: Supple, No JVD  CHEST/LUNG: Clear to auscultation bilaterally; No wheeze  HEART: Regular rate and rhythm; No murmurs, rubs, or gallops  ABDOMEN: Soft, Nontender, Nondistended; Bowel sounds present  EXTREMITIES:  2+ Peripheral Pulses, No clubbing, cyanosis, or edema  PSYCH: AAOx3  NEUROLOGY: non-focal  SKIN: No rashes or lesions    LABS:                        8.7    10.5  )-----------( 381      ( 21 Nov 2018 09:59 )             27.0     11-21    141  |  101  |  17  ----------------------------<  143<H>  4.7   |  27  |  0.57    Ca    8.6      21 Nov 2018 10:02  Phos  2.4     11-20  Mg     1.6     11-20                RADIOLOGY & ADDITIONAL TESTS:    Imaging Personally Reviewed:    Consultant(s) Notes Reviewed:      Care Discussed with Consultants/Other Providers:

## 2018-11-21 NOTE — DISCHARGE NOTE ADULT - MEDICATION SUMMARY - MEDICATIONS TO TAKE
I will START or STAY ON the medications listed below when I get home from the hospital:    Semielectric hospital bed and alternating pressure pad  -- Indication: For Buttock wound, right, initial encounter    hospital bed  -- Indication: For Buttock wound, right, initial encounter    Gel Overlay for hospital bed  -- Indication: For Buttock wound, right, initial encounter    predniSONE 5 mg oral tablet  -- 9 tab(s) by mouth once a day STARTING 11/23 x 7 days AND THEN TAPER DOWN EVERY 5MG EVERY 7 DAYS UNTIL COMPLETE--45 TABS  -- Indication: For Pyodermic gangrenosum    HYDROmorphone 2 mg oral tablet  -- 1 tab(s) by mouth every 4 hours, As needed, Severe Pain (7 - 10)  -- Indication: For Buttock wound, right, initial encounter    aspirin 81 mg oral delayed release tablet  -- 1 tab(s) by mouth once a day  -- Indication: For CAD (coronary artery disease)    acetaminophen 325 mg oral tablet  -- 2 tab(s) by mouth every 4 hours, As needed, Temp greater or equal to 38C (100.4F), Mild Pain (1 - 3)  -- Indication: For Buttock wound, right, initial encounter    Xarelto 10 mg oral tablet  -- 1 tab(s) by mouth once a day   -- Check with your doctor before becoming pregnant.  It is very important that you take or use this exactly as directed.  Do not skip doses or discontinue unless directed by your doctor.  Obtain medical advice before taking any non-prescription drugs as some may affect the action of this medication.  Take with food.    -- Indication: For afib    gabapentin 300 mg oral capsule  -- 1 cap(s) by mouth 3 times a day  -- Indication: For neuropathy    loratadine 10 mg oral tablet  -- 1 tab(s) by mouth once a day  -- Indication: For seasonal allergy    simvastatin 20 mg oral tablet  -- 1 tab(s) by mouth once a day (at bedtime)  -- Indication: For HLD    doxycycline monohydrate 100 mg oral capsule  -- 1 cap(s) by mouth every 12 hours  -- Indication: For Buttock wound, right, initial encounter    metoprolol  -- 12.5 milligram(s) by mouth 2 times a day  -- Indication: For HTN    budesonide-formoterol 160 mcg-4.5 mcg/inh inhalation aerosol  -- 1 inhaler(s) inhaled 2 times a day   -- Indication: For SOB    nystatin 100,000 units/g topical powder  -- 1 application on skin 2 times a day  -- Indication: For Prophylaxis    polyethylene glycol 3350 oral powder for reconstitution  -- 17 gram(s) by mouth once a day, As needed, Constipation  -- Indication: For Constipation    melatonin 3 mg oral tablet  -- 1 tab(s) by mouth once a day (at bedtime)  -- Indication: For insomnia    pantoprazole 40 mg oral delayed release tablet  -- 1 tab(s) by mouth once a day (before a meal)  -- Indication: For GERD    levoFLOXacin 500 mg oral tablet  -- 1 tab(s) by mouth every 24 hours   -- Indication: For Buttock wound, right, initial encounter    calcium-vitamin D 500 mg-200 intl units oral tablet  -- 1 tab(s) by mouth 3 times a day  -- Indication: For SUPPLEMENT    Multiple Vitamins oral tablet  -- 1 tab(s) by mouth once a day  -- Indication: For SUPPLEMENT    ascorbic acid 500 mg oral tablet  -- 1 tab(s) by mouth once a day  -- Indication: For SUPPLEMENT    folic acid 1 mg oral tablet  -- 1 tab(s) by mouth once a day  -- Indication: For SUPPLEMENT I will START or STAY ON the medications listed below when I get home from the hospital:    Semielectric hospital bed and alternating pressure pad  -- Indication: For Buttock wound, right, initial encounter    hospital bed  -- Indication: For Buttock wound, right, initial encounter    Gel Overlay for hospital bed  -- Indication: For Buttock wound, right, initial encounter    predniSONE 5 mg oral tablet  -- 9 tab(s) by mouth once a day STARTING 11/23 x 7 days AND THEN TAPER DOWN EVERY 5MG EVERY 7 DAYS UNTIL COMPLETE--45 TABS  -- Indication: For Pyodermic gangrenosum    HYDROmorphone 2 mg oral tablet  -- 1 tab(s) by mouth every 4 hours, As needed, Severe Pain (7 - 10)  -- Indication: For Buttock wound, right, initial encounter    aspirin 81 mg oral delayed release tablet  -- 1 tab(s) by mouth once a day  -- Indication: For CAD (coronary artery disease)    acetaminophen 325 mg oral tablet  -- 2 tab(s) by mouth every 4 hours, As needed, Temp greater or equal to 38C (100.4F), Mild Pain (1 - 3)  -- Indication: For Buttock wound, right, initial encounter    Xarelto 10 mg oral tablet  -- 1 tab(s) by mouth once a day   -- Check with your doctor before becoming pregnant.  It is very important that you take or use this exactly as directed.  Do not skip doses or discontinue unless directed by your doctor.  Obtain medical advice before taking any non-prescription drugs as some may affect the action of this medication.  Take with food.    -- Indication: For dvt/pe prophylaxis    gabapentin 300 mg oral capsule  -- 1 cap(s) by mouth 3 times a day  -- Indication: For neuropathy    loratadine 10 mg oral tablet  -- 1 tab(s) by mouth once a day  -- Indication: For seasonal allergy    simvastatin 20 mg oral tablet  -- 1 tab(s) by mouth once a day (at bedtime)  -- Indication: For HLD    doxycycline monohydrate 100 mg oral capsule  -- 1 cap(s) by mouth every 12 hours  -- Indication: For Buttock wound, right, initial encounter    metoprolol  -- 12.5 milligram(s) by mouth 2 times a day  -- Indication: For HTN    budesonide-formoterol 160 mcg-4.5 mcg/inh inhalation aerosol  -- 1 inhaler(s) inhaled 2 times a day   -- Indication: For SOB    nystatin 100,000 units/g topical powder  -- 1 application on skin 2 times a day  -- Indication: For Prophylaxis    polyethylene glycol 3350 oral powder for reconstitution  -- 17 gram(s) by mouth once a day, As needed, Constipation  -- Indication: For Constipation    melatonin 3 mg oral tablet  -- 1 tab(s) by mouth once a day (at bedtime)  -- Indication: For insomnia    pantoprazole 40 mg oral delayed release tablet  -- 1 tab(s) by mouth once a day (before a meal)  -- Indication: For GERD    levoFLOXacin 500 mg oral tablet  -- 1 tab(s) by mouth every 24 hours   -- Indication: For Buttock wound, right, initial encounter    calcium-vitamin D 500 mg-200 intl units oral tablet  -- 1 tab(s) by mouth 3 times a day  -- Indication: For SUPPLEMENT    Multiple Vitamins oral tablet  -- 1 tab(s) by mouth once a day  -- Indication: For SUPPLEMENT    ascorbic acid 500 mg oral tablet  -- 1 tab(s) by mouth once a day  -- Indication: For SUPPLEMENT    folic acid 1 mg oral tablet  -- 1 tab(s) by mouth once a day  -- Indication: For SUPPLEMENT I will START or STAY ON the medications listed below when I get home from the hospital:    Semielectric hospital bed and alternating pressure pad  -- Indication: For Buttock wound, right, initial encounter    hospital bed  -- Indication: For Buttock wound, right, initial encounter    Gel Overlay for hospital bed  -- Indication: For Buttock wound, right, initial encounter    predniSONE 5 mg oral tablet  -- 9 tab(s) by mouth once a day STARTING 11/23 x 7 days AND THEN TAPER DOWN EVERY 5MG EVERY 7 DAYS UNTIL COMPLETE--45 TABS  -- Indication: For Pyodermic gangrenosum    HYDROmorphone 2 mg oral tablet  -- 1 tab(s) by mouth every 4 hours, As needed, Severe Pain (7 - 10)  -- Indication: For Buttock wound, right, initial encounter    aspirin 81 mg oral delayed release tablet  -- 1 tab(s) by mouth once a day  -- Indication: For CAD (coronary artery disease)    acetaminophen 325 mg oral tablet  -- 2 tab(s) by mouth every 4 hours, As needed, Temp greater or equal to 38C (100.4F), Mild Pain (1 - 3)  -- Indication: For Buttock wound, right, initial encounter    Xarelto 10 mg oral tablet  -- 1 tab(s) by mouth once a day   -- Check with your doctor before becoming pregnant.  It is very important that you take or use this exactly as directed.  Do not skip doses or discontinue unless directed by your doctor.  Obtain medical advice before taking any non-prescription drugs as some may affect the action of this medication.  Take with food.    -- Indication: For dvt/pe prophylaxis    gabapentin 300 mg oral capsule  -- 1 cap(s) by mouth 3 times a day  -- Indication: For neuropathy    loratadine 10 mg oral tablet  -- 1 tab(s) by mouth once a day  -- Indication: For seasonal allergy    simvastatin 20 mg oral tablet  -- 1 tab(s) by mouth once a day (at bedtime)  -- Indication: For HLD    doxycycline monohydrate 100 mg oral capsule  -- 1 cap(s) by mouth every 12 hours  -- Indication: For Buttock wound, right, initial encounter    sodium hypochlorite 0.125% topical solution  -- 1 application on skin 2 times a day  -- Indication: For Buttock wound, right, initial encounter    metoprolol  -- 12.5 milligram(s) by mouth 2 times a day  -- Indication: For HTN    budesonide-formoterol 160 mcg-4.5 mcg/inh inhalation aerosol  -- 1 inhaler(s) inhaled 2 times a day   -- Indication: For SOB    nystatin 100,000 units/g topical powder  -- 1 application on skin 2 times a day  -- Indication: For Prophylaxis    polyethylene glycol 3350 oral powder for reconstitution  -- 17 gram(s) by mouth once a day, As needed, Constipation  -- Indication: For Constipation    melatonin 3 mg oral tablet  -- 1 tab(s) by mouth once a day (at bedtime)  -- Indication: For insomnia    pantoprazole 40 mg oral delayed release tablet  -- 1 tab(s) by mouth once a day (before a meal)  -- Indication: For GERD    levoFLOXacin 500 mg oral tablet  -- 1 tab(s) by mouth every 24 hours   -- Indication: For Buttock wound, right, initial encounter    calcium-vitamin D 500 mg-200 intl units oral tablet  -- 1 tab(s) by mouth 3 times a day  -- Indication: For SUPPLEMENT    Multiple Vitamins oral tablet  -- 1 tab(s) by mouth once a day  -- Indication: For SUPPLEMENT    ascorbic acid 500 mg oral tablet  -- 1 tab(s) by mouth once a day  -- Indication: For SUPPLEMENT    folic acid 1 mg oral tablet  -- 1 tab(s) by mouth once a day  -- Indication: For SUPPLEMENT

## 2018-11-21 NOTE — DISCHARGE NOTE ADULT - CARE PROVIDERS DIRECT ADDRESSES
,DirectAddress_Unknown,[email or web address] [email or web address] [email or web address] [email or web address]

## 2018-11-21 NOTE — PROGRESS NOTE ADULT - SUBJECTIVE AND OBJECTIVE BOX
CC: f/u for superinfected thigh wound and sepsis    Patient reports it hurts when her wounds need to be examined    REVIEW OF SYSTEMS:  All other review of systems negative (Comprehensive ROS)    Antimicrobials Day #  :9/14  doxycycline hyclate Capsule 100 milliGRAM(s) Oral every 12 hours  levoFLOXacin  Tablet 500 milliGRAM(s) Oral every 24 hours    Other Medications Reviewed    T(F): 97.7 (11-21-18 @ 09:57), Max: 98.3 (11-20-18 @ 14:30)  HR: 64 (11-21-18 @ 09:57)  BP: 144/76 (11-21-18 @ 09:57)  RR: 18 (11-21-18 @ 09:57)  SpO2: 98% (11-21-18 @ 09:57)  Wt(kg): --    PHYSICAL EXAM:  General: alert, no acute distress  Eyes:  anicteric, no conjunctival injection, no discharge  Oropharynx: no lesions or injection 	  Neck: supple, without adenopathy  Lungs: clear to auscultation  Heart: regular rate and rhythm; no murmur, rubs or gallops  Abdomen: soft, nondistended, nontender, without mass or organomegaly  Skin: no rash  Extremities: right lateral hip wound with eschar, tunnels, no active drainage  Neurologic: alert, oriented, moves all extremities    LAB RESULTS:                        8.7    10.5  )-----------( 381      ( 21 Nov 2018 09:59 )             27.0     11-21    141  |  101  |  x   ----------------------------<  143<H>  4.7   |  x   |  0.57    Ca    8.6      21 Nov 2018 10:02  Phos  2.4     11-20  Mg     1.6     11-20          MICROBIOLOGY:  RECENT CULTURES:      RADIOLOGY REVIEWED:    < from: CT Abdomen and Pelvis w/ IV Cont (11.12.18 @ 09:31) >  IMPRESSION:     Limited evaluation of the right-sided subcutaneous soft tissues secondary   to artifact from the gantry. Partially imaged subcutaneous edema in this   region. No subcutaneous emphysema is visualized.    Persistent wall thickening and dilatation of the appendiceal tip for   which differential includes chronic inflammation versus focal lesion.    Small bilateral pleural effusions and bibasilar atelectasis.      < end of copied text >    Assessment:  Patient with pyoderma gangrenosa with multiple wounds admitted last week with severe sepsis from superinfected right lateral thigh wound now clinically stable and being readied for discharge.   Plan: 5 more days of levaquin  local wound care  no ID objection to discharge

## 2018-11-21 NOTE — DISCHARGE NOTE ADULT - HOME CARE AGENCY
Your case has been referred to [facility]. A nurse will call you to schedule a visit after discharge. If you have any questions you may call [facility] at

## 2018-11-21 NOTE — DISCHARGE NOTE ADULT - CARE PROVIDER_API CALL
Aki Anderson), Internal Medicine  [address] [facility]  [address]  [place] [address]  Phone: [phone]  Fax: [phone]    Roverto Roblero (MD; PhD), Cardiology; Internal Medicine; Vascular Medicine  [address] [place] [address]  [place] [address]  Phone: [phone]  Fax: [phone]    Elmer Joseph (MD), Surgery  [address]  [address] [facility] [address]  [place] [address]  Phone: [phone]  Fax: [phone]    Zackery Boyd), Dermatology  [address] [facility] [address]  [place] [address]  Phone: [phone]  Fax: [phone]

## 2018-11-21 NOTE — DISCHARGE NOTE ADULT - CARE PLAN
Principal Discharge DX:	Buttock wound, right, initial encounter  Secondary Diagnosis:	Pyodermic gangrenosum  Secondary Diagnosis:	Osteoarthritis  Secondary Diagnosis:	Hyperlipidemia  Secondary Diagnosis:	CAD (coronary artery disease) Principal Discharge DX:	Buttock wound, right, initial encounter  Goal:	Meeting sepsis criteria, now resolved  Assessment and plan of treatment:	-Right thigh wound has significantly less drainage but remains associated with a deep cavity in close proximity to bone, continue antibiotics until course is complete.  -Wound itself remains necrotic, without odor, or cellulitis: continue antibiotics until course is complete.  -3 other wounds of right lateral, posterior thigh, are clean  -Continue to dress wounds with Dakin moistened kathrin  -[facility] in one [facility] (address and information below)  Secondary Diagnosis:	Pyodermic gangrenosum  Assessment and plan of treatment:	Seen by Derm, plan to taper Prednisone as follows;  Start 45mg tomorrow 11/23 and taper down 5mg every 7 days until completed  Follow-up with Derm outpatient  Secondary Diagnosis:	Osteoarthritis  Assessment and plan of treatment:	Continue pain management as recommended  Secondary Diagnosis:	Hyperlipidemia  Assessment and plan of treatment:	Continue current medication therapy as directed. Routine PCP & Cardiology f/u as advised.  Secondary Diagnosis:	CAD (coronary artery disease)  Assessment and plan of treatment:	Brief episode of CP during hospitalization, ischemic w/u negative, CP resolved, likely anxiety  Coronary artery disease is a condition where the arteries the supply the heart muscle get clogged with fatty deposits & puts you at risk for a heart attack  Call your doctor if you have any new pain, pressure, or discomfort in the center of your chest, pain, tingling or discomfort in arms, back, neck, jaw, or stomach, shortness of breath, nausea, vomiting, burping or heartburn, sweating, cold and clammy skin, racing or abnormal heartbeat for more than 10 minutes or if they keep coming & going.  Call 911 and do not tr to get to hospital by care  You can help yourself with lifestyle changes (quitting smoking if you smoke), eat lots of fruits & vegetables & low fat dairy products, not a lot of meat & fatty foods, walk or some form of physical activity most days of the week, lose weight if you are overweight  Take your cardiac medication as prescribed to lower cholesterol, to lower blood pressure, aspirin to prevent blood clots, and diabetes control  Make sure to keep appointments with Cardiologist-Dr. Roblero, for cardiac follow up care

## 2018-11-21 NOTE — PROGRESS NOTE ADULT - SUBJECTIVE AND OBJECTIVE BOX
OVERNIGHT EVENTS: No acute events overnight. Reports wounds are less painful. Remains afebrile. Eager to go home.    MEDICATIONS  (STANDING):  aspirin enteric coated 81 milliGRAM(s) Oral daily  buDESOnide 160 MICROgram(s)/formoterol 4.5 MICROgram(s) Inhaler 2 Puff(s) Inhalation two times a day  chlorhexidine 4% Liquid 1 Application(s) Topical <User Schedule>  Dakins Solution - 1/4 Strength 1 Application(s) Topical two times a day  doxycycline hyclate Capsule 100 milliGRAM(s) Oral every 12 hours  gabapentin 300 milliGRAM(s) Oral three times a day  heparin  Injectable 5000 Unit(s) SubCutaneous every 8 hours  levoFLOXacin  Tablet 500 milliGRAM(s) Oral every 24 hours  melatonin 5 milliGRAM(s) Oral at bedtime  metoprolol tartrate 12.5 milliGRAM(s) Oral two times a day  nystatin Powder 1 Application(s) Topical two times a day  pantoprazole    Tablet 40 milliGRAM(s) Oral before breakfast  predniSONE   Tablet 50 milliGRAM(s) Oral daily  simvastatin 20 milliGRAM(s) Oral at bedtime    MEDICATIONS  (PRN):  acetaminophen   Tablet .. 650 milliGRAM(s) Oral every 6 hours PRN Mild Pain (1 - 3)  HYDROmorphone   Tablet 2 milliGRAM(s) Oral every 4 hours PRN Severe Pain (7 - 10)  polyethylene glycol 3350 17 Gram(s) Oral daily PRN Constipation      Allergies    amoxicillin (Other)    Intolerances    IV Contrast (Flushing (Skin); Nausea)      REVIEW OF SYSTEMS      General: no fevers/chills, no NS	    Skin: see HPI  	  Ophthalmologic: no eye pain or change in vision    Genitourinary: no dysuria or hematuria    Musculoskeletal: no joint pains or weakness	    Neurological:no weakness or tingling          Vital Signs Last 24 Hrs  T(C): 36.5 (21 Nov 2018 09:57), Max: 36.8 (20 Nov 2018 17:00)  T(F): 97.7 (21 Nov 2018 09:57), Max: 98.2 (20 Nov 2018 17:00)  HR: 64 (21 Nov 2018 09:57) (61 - 70)  BP: 144/76 (21 Nov 2018 09:57) (143/78 - 166/78)  BP(mean): --  RR: 18 (21 Nov 2018 09:57) (18 - 18)  SpO2: 98% (21 Nov 2018 09:57) (94% - 98%)    PHYSICAL EXAM:   The patient was alert and oriented X 3, well nourished, and in no  apparent distress.  There was no visible lymphadenopathy.  Conjunctiva were non injected  There was no clubbing or edema of extremities.  There was no hyperhidrosis or bromhidrosis.    Of note on skin exam:   R thigh with ~4 cm and ~10 cm, punch out ulcer with clean base, no purulence, minimally inflammatory border. Lower abdomen clear. R medial leg with superificial clean-based ulcer.    LABS:                        8.7    10.5  )-----------( 381      ( 21 Nov 2018 09:59 )             27.0     11-21    141  |  101  |  17  ----------------------------<  143<H>  4.7   |  27  |  0.57    Ca    8.6      21 Nov 2018 10:02  Phos  2.4     11-20  Mg     1.6     11-20            RADIOLOGY & ADDITIONAL TESTS:

## 2018-11-21 NOTE — PROGRESS NOTE ADULT - ASSESSMENT
# Skin ulcer  - R posterior thigh. Previously necrotic. Ddx includes angioinvasive infection vs. intravascular thrombosis/hypercoagulability  - tissue cx: Morganella, Proteus, Enterococcus  - Improved with abx, now on Levaquin monotherapy  - bx results were non-specific  - Coagulopathy work-up was negative    - Complete course of Levaquin per ID    # Pyoderma gangrenosum  Old abdominal ulcers healed well  - Decrease to prednisone 45 mg. Taper by 5 mg every week    Patient seen and discussed with Dr. Boyd

## 2018-11-21 NOTE — PROGRESS NOTE ADULT - SUBJECTIVE AND OBJECTIVE BOX
pain controlled  says she is sad and wants to speak with someone    afvss  nad  abdominal wound - healed  RLE  posterior thigh - dressing by wound care  lateral/anterior thigh - mostly healed  knee wound mostly healed, epithelializing, no drainage  remainder of wounds dressed by wound care  5/5 ta/ehl/gcs  silt l4-s1  2+ dp pulse     ros: depressed, crying

## 2018-11-21 NOTE — PROGRESS NOTE ADULT - ASSESSMENT
77 year old woman with PMH of CAD s/p stent to LAD, AS s/p TAVR, PPM, DVT (Jan '18) on coumadin, IVC filter, pyoderma gangrenosum, and total knee replacement c/b joint infections s/p I&D explantation, static spacer placement and complex wound closure by plastic surgery presents with fevers and malodorous right lateral hip wound. She was brought from [facility]. She complained of pain in her legs. On presentation she was febrile to 39.1, hypotensive (systolic 80s), WBC count 17, INR 2.6, and lactate 2.0. Of note, it was documented in Oct 2018 that patient has completed course of anticoagulation for provoked DVT. pt's family has refused surgery for this wound.  pt was seen by derm and wound was biopsied.    sepsis  - right hip wound  - Meropenem and Doxy   - may be switched to oral levaquin On Dc     h/o pyoderma gangrenosum  - cw prednisone    pain  - cw neurontin  - iv dilaudid prn    HLD  - simvastatin    dvt px  - heparin    gi px  - protonix    pt eval    d/c planning  - pt wants to go home.  Hospital bed etc to be delivered.   - d/c planning for tomorrow

## 2018-11-22 ENCOUNTER — INBOUND DOCUMENT (OUTPATIENT)
Age: 78
End: 2018-11-22

## 2018-11-22 VITALS
DIASTOLIC BLOOD PRESSURE: 90 MMHG | OXYGEN SATURATION: 95 % | TEMPERATURE: 98 F | SYSTOLIC BLOOD PRESSURE: 145 MMHG | HEART RATE: 60 BPM | RESPIRATION RATE: 18 BRPM

## 2018-11-22 LAB
ANION GAP SERPL CALC-SCNC: 12 MMOL/L — SIGNIFICANT CHANGE UP (ref 5–17)
BUN SERPL-MCNC: 18 MG/DL — SIGNIFICANT CHANGE UP (ref 7–23)
CALCIUM SERPL-MCNC: 8.6 MG/DL — SIGNIFICANT CHANGE UP (ref 8.4–10.5)
CHLORIDE SERPL-SCNC: 100 MMOL/L — SIGNIFICANT CHANGE UP (ref 96–108)
CO2 SERPL-SCNC: 27 MMOL/L — SIGNIFICANT CHANGE UP (ref 22–31)
CREAT SERPL-MCNC: 0.52 MG/DL — SIGNIFICANT CHANGE UP (ref 0.5–1.3)
GLUCOSE SERPL-MCNC: 133 MG/DL — HIGH (ref 70–99)
POTASSIUM SERPL-MCNC: 5.5 MMOL/L — HIGH (ref 3.5–5.3)
POTASSIUM SERPL-SCNC: 5.5 MMOL/L — HIGH (ref 3.5–5.3)
SODIUM SERPL-SCNC: 139 MMOL/L — SIGNIFICANT CHANGE UP (ref 135–145)

## 2018-11-22 PROCEDURE — 81241 F5 GENE: CPT

## 2018-11-22 PROCEDURE — 83090 ASSAY OF HOMOCYSTEINE: CPT

## 2018-11-22 PROCEDURE — 85385 FIBRINOGEN ANTIGEN: CPT

## 2018-11-22 PROCEDURE — 71045 X-RAY EXAM CHEST 1 VIEW: CPT

## 2018-11-22 PROCEDURE — 86850 RBC ANTIBODY SCREEN: CPT

## 2018-11-22 PROCEDURE — 86140 C-REACTIVE PROTEIN: CPT

## 2018-11-22 PROCEDURE — 96376 TX/PRO/DX INJ SAME DRUG ADON: CPT | Mod: XU

## 2018-11-22 PROCEDURE — 99285 EMERGENCY DEPT VISIT HI MDM: CPT | Mod: 25

## 2018-11-22 PROCEDURE — 87186 SC STD MICRODIL/AGAR DIL: CPT

## 2018-11-22 PROCEDURE — 96361 HYDRATE IV INFUSION ADD-ON: CPT | Mod: XU

## 2018-11-22 PROCEDURE — 51701 INSERT BLADDER CATHETER: CPT

## 2018-11-22 PROCEDURE — 80202 ASSAY OF VANCOMYCIN: CPT

## 2018-11-22 PROCEDURE — 96368 THER/DIAG CONCURRENT INF: CPT

## 2018-11-22 PROCEDURE — 87150 DNA/RNA AMPLIFIED PROBE: CPT

## 2018-11-22 PROCEDURE — 85730 THROMBOPLASTIN TIME PARTIAL: CPT

## 2018-11-22 PROCEDURE — 85379 FIBRIN DEGRADATION QUANT: CPT

## 2018-11-22 PROCEDURE — 96367 TX/PROPH/DG ADDL SEQ IV INF: CPT

## 2018-11-22 PROCEDURE — 84484 ASSAY OF TROPONIN QUANT: CPT

## 2018-11-22 PROCEDURE — 86870 RBC ANTIBODY IDENTIFICATION: CPT

## 2018-11-22 PROCEDURE — 96365 THER/PROPH/DIAG IV INF INIT: CPT | Mod: XU

## 2018-11-22 PROCEDURE — 88305 TISSUE EXAM BY PATHOLOGIST: CPT

## 2018-11-22 PROCEDURE — 86901 BLOOD TYPING SEROLOGIC RH(D): CPT

## 2018-11-22 PROCEDURE — 83605 ASSAY OF LACTIC ACID: CPT

## 2018-11-22 PROCEDURE — 93005 ELECTROCARDIOGRAM TRACING: CPT

## 2018-11-22 PROCEDURE — 86900 BLOOD TYPING SEROLOGIC ABO: CPT

## 2018-11-22 PROCEDURE — 86160 COMPLEMENT ANTIGEN: CPT

## 2018-11-22 PROCEDURE — 85306 CLOT INHIBIT PROT S FREE: CPT

## 2018-11-22 PROCEDURE — 81001 URINALYSIS AUTO W/SCOPE: CPT

## 2018-11-22 PROCEDURE — 87086 URINE CULTURE/COLONY COUNT: CPT

## 2018-11-22 PROCEDURE — 96375 TX/PRO/DX INJ NEW DRUG ADDON: CPT | Mod: XU

## 2018-11-22 PROCEDURE — 85027 COMPLETE CBC AUTOMATED: CPT

## 2018-11-22 PROCEDURE — 74177 CT ABD & PELVIS W/CONTRAST: CPT

## 2018-11-22 PROCEDURE — 83735 ASSAY OF MAGNESIUM: CPT

## 2018-11-22 PROCEDURE — 84100 ASSAY OF PHOSPHORUS: CPT

## 2018-11-22 PROCEDURE — 87040 BLOOD CULTURE FOR BACTERIA: CPT

## 2018-11-22 PROCEDURE — 86880 COOMBS TEST DIRECT: CPT

## 2018-11-22 PROCEDURE — 85303 CLOT INHIBIT PROT C ACTIVITY: CPT

## 2018-11-22 PROCEDURE — 83690 ASSAY OF LIPASE: CPT

## 2018-11-22 PROCEDURE — 80053 COMPREHEN METABOLIC PANEL: CPT

## 2018-11-22 PROCEDURE — 94640 AIRWAY INHALATION TREATMENT: CPT

## 2018-11-22 PROCEDURE — 80048 BASIC METABOLIC PNL TOTAL CA: CPT

## 2018-11-22 PROCEDURE — 88312 SPECIAL STAINS GROUP 1: CPT

## 2018-11-22 PROCEDURE — 86147 CARDIOLIPIN ANTIBODY EA IG: CPT

## 2018-11-22 PROCEDURE — 83880 ASSAY OF NATRIURETIC PEPTIDE: CPT

## 2018-11-22 PROCEDURE — 87102 FUNGUS ISOLATION CULTURE: CPT

## 2018-11-22 PROCEDURE — 87070 CULTURE OTHR SPECIMN AEROBIC: CPT

## 2018-11-22 PROCEDURE — 85610 PROTHROMBIN TIME: CPT

## 2018-11-22 PROCEDURE — 86922 COMPATIBILITY TEST ANTIGLOB: CPT

## 2018-11-22 PROCEDURE — 82595 ASSAY OF CRYOGLOBULIN: CPT

## 2018-11-22 RX ORDER — HYDROMORPHONE HYDROCHLORIDE 2 MG/ML
1 INJECTION INTRAMUSCULAR; INTRAVENOUS; SUBCUTANEOUS
Qty: 0 | Refills: 0 | COMMUNITY
Start: 2018-11-22

## 2018-11-22 RX ORDER — SODIUM HYPOCHLORITE 0.125 %
1 SOLUTION, NON-ORAL MISCELLANEOUS
Qty: 473 | Refills: 0 | OUTPATIENT
Start: 2018-11-22

## 2018-11-22 RX ORDER — NYSTATIN CREAM 100000 [USP'U]/G
1 CREAM TOPICAL
Qty: 1 | Refills: 0 | OUTPATIENT
Start: 2018-11-22 | End: 2018-12-01

## 2018-11-22 RX ADMIN — Medication 50 MILLIGRAM(S): at 05:10

## 2018-11-22 RX ADMIN — HYDROMORPHONE HYDROCHLORIDE 2 MILLIGRAM(S): 2 INJECTION INTRAMUSCULAR; INTRAVENOUS; SUBCUTANEOUS at 08:57

## 2018-11-22 RX ADMIN — GABAPENTIN 300 MILLIGRAM(S): 400 CAPSULE ORAL at 05:09

## 2018-11-22 RX ADMIN — HYDROMORPHONE HYDROCHLORIDE 2 MILLIGRAM(S): 2 INJECTION INTRAMUSCULAR; INTRAVENOUS; SUBCUTANEOUS at 09:18

## 2018-11-22 RX ADMIN — HYDROMORPHONE HYDROCHLORIDE 2 MILLIGRAM(S): 2 INJECTION INTRAMUSCULAR; INTRAVENOUS; SUBCUTANEOUS at 05:20

## 2018-11-22 RX ADMIN — NYSTATIN CREAM 1 APPLICATION(S): 100000 CREAM TOPICAL at 05:08

## 2018-11-22 RX ADMIN — Medication 100 MILLIGRAM(S): at 05:09

## 2018-11-22 RX ADMIN — Medication 1 APPLICATION(S): at 11:19

## 2018-11-22 RX ADMIN — RIVAROXABAN 10 MILLIGRAM(S): KIT at 05:28

## 2018-11-22 RX ADMIN — HYDROMORPHONE HYDROCHLORIDE 2 MILLIGRAM(S): 2 INJECTION INTRAMUSCULAR; INTRAVENOUS; SUBCUTANEOUS at 04:50

## 2018-11-22 RX ADMIN — Medication 12.5 MILLIGRAM(S): at 05:09

## 2018-11-22 RX ADMIN — BUDESONIDE AND FORMOTEROL FUMARATE DIHYDRATE 2 PUFF(S): 160; 4.5 AEROSOL RESPIRATORY (INHALATION) at 05:10

## 2018-11-22 RX ADMIN — PANTOPRAZOLE SODIUM 40 MILLIGRAM(S): 20 TABLET, DELAYED RELEASE ORAL at 05:10

## 2018-11-22 NOTE — PROGRESS NOTE ADULT - ASSESSMENT
77 year old woman with PMH of CAD s/p stent to LAD, AS s/p TAVR, PPM, DVT (Jan '18) on coumadin, IVC filter, pyoderma gangrenosum, and total knee replacement c/b joint infections s/p I&D explantation, static spacer placement and complex wound closure by plastic surgery presents with fevers and malodorous right lateral hip wound. She was brought from [facility]. She complained of pain in her legs. On presentation she was febrile to 39.1, hypotensive (systolic 80s), WBC count 17, INR 2.6, and lactate 2.0. Of note, it was documented in Oct 2018 that patient has completed course of anticoagulation for provoked DVT. pt's family has refused surgery for this wound.  pt was seen by derm and wound was biopsied.    sepsis  - right hip wound  - levoquin and Doxy     h/o pyoderma gangrenosum  - cw prednisone    pain  - cw neurontin  - iv dilaudid prn    HLD  - simvastatin    dvt px  - xarelto    gi px  - protonix    pt eval    d/c planning  - d/c planning for today

## 2018-11-22 NOTE — PROGRESS NOTE ADULT - PROVIDER SPECIALTY LIST ADULT
Dermatology
Infectious Disease
Internal Medicine
Orthopedics
Surgery
Trauma Surgery
Wound Care
Internal Medicine
Orthopedics
Wound Care
Internal Medicine

## 2018-11-22 NOTE — PROGRESS NOTE ADULT - SUBJECTIVE AND OBJECTIVE BOX
Patient is a 78y old  Female who presents with a chief complaint of Wound infection (21 Nov 2018 15:04)      SUBJECTIVE / OVERNIGHT EVENTS:  No chest pain. No shortness of breath. No complaints. No events overnight. happy about going home today.    MEDICATIONS  (STANDING):  aspirin enteric coated 81 milliGRAM(s) Oral daily  buDESOnide 160 MICROgram(s)/formoterol 4.5 MICROgram(s) Inhaler 2 Puff(s) Inhalation two times a day  chlorhexidine 4% Liquid 1 Application(s) Topical <User Schedule>  Dakins Solution - 1/4 Strength 1 Application(s) Topical two times a day  doxycycline hyclate Capsule 100 milliGRAM(s) Oral every 12 hours  gabapentin 300 milliGRAM(s) Oral three times a day  levoFLOXacin  Tablet 500 milliGRAM(s) Oral every 24 hours  melatonin 5 milliGRAM(s) Oral at bedtime  metoprolol tartrate 12.5 milliGRAM(s) Oral two times a day  nystatin Powder 1 Application(s) Topical two times a day  pantoprazole    Tablet 40 milliGRAM(s) Oral before breakfast  predniSONE   Tablet 45 milliGRAM(s) Oral daily  rivaroxaban 10 milliGRAM(s) Oral daily  simvastatin 20 milliGRAM(s) Oral at bedtime    MEDICATIONS  (PRN):  acetaminophen   Tablet .. 650 milliGRAM(s) Oral every 6 hours PRN Mild Pain (1 - 3)  HYDROmorphone   Tablet 2 milliGRAM(s) Oral every 4 hours PRN Severe Pain (7 - 10)  polyethylene glycol 3350 17 Gram(s) Oral daily PRN Constipation      Vital Signs Last 24 Hrs  T(C): 36.8 (22 Nov 2018 09:08), Max: 37 (21 Nov 2018 23:04)  T(F): 98.2 (22 Nov 2018 09:08), Max: 98.6 (21 Nov 2018 23:04)  HR: 60 (22 Nov 2018 09:08) (60 - 70)  BP: 145/90 (22 Nov 2018 09:08) (145/90 - 172/69)  BP(mean): --  RR: 18 (22 Nov 2018 09:08) (18 - 20)  SpO2: 95% (22 Nov 2018 09:08) (94% - 98%)  CAPILLARY BLOOD GLUCOSE        I&O's Summary    21 Nov 2018 07:01  -  22 Nov 2018 07:00  --------------------------------------------------------  IN: 480 mL / OUT: 1900 mL / NET: -1420 mL    22 Nov 2018 07:01  -  22 Nov 2018 12:51  --------------------------------------------------------  IN: 180 mL / OUT: 250 mL / NET: -70 mL        PHYSICAL EXAM:  GENERAL: NAD, well-developed  HEAD:  Atraumatic, Normocephalic  EYES: EOMI, PERRLA, conjunctiva and sclera clear  NECK: Supple, No JVD  CHEST/LUNG: Clear to auscultation bilaterally; No wheeze  HEART: Regular rate and rhythm; No murmurs, rubs, or gallops  ABDOMEN: Soft, Nontender, Nondistended; Bowel sounds present  EXTREMITIES:  2+ Peripheral Pulses, No clubbing, cyanosis, or edema  PSYCH: AAOx3  NEUROLOGY: non-focal  SKIN: No rashes or lesions    LABS:                        8.7    10.5  )-----------( 381      ( 21 Nov 2018 09:59 )             27.0     11-22    139  |  100  |  18  ----------------------------<  133<H>  5.5<H>   |  27  |  0.52    Ca    8.6      22 Nov 2018 09:58                RADIOLOGY & ADDITIONAL TESTS:    Imaging Personally Reviewed:    Consultant(s) Notes Reviewed:      Care Discussed with Consultants/Other Providers:

## 2018-11-22 NOTE — BEHAVIORAL HEALTH ASSESSMENT NOTE - PRIOR MEDICATION SIDE EFFECTS OR ADVERSE REACTIONS
Verified Results  CHLAMYDIA / GC BY NUCLEIC ACID AMPLIFICATION 19Sep2017 12:01AM ROSALES MARQUES   [Sep 25, 2017 8:19AM ROSALES MARQUES]  negative gonorrhea and chlamydia     Test Name Result Flag Reference   SOURCE ENDOCERVIX     CHLAMYDIA TRACHOMATIS BY NUCLEIC ACID AMPLIFICATION NEGATIVE  NEGATIVE   NEISSERIA GONORRHOEAE BY NUCLEIC ACID AMPLIFICATION NEGATIVE  NEGATIVE     URINE CULTURE 19Sep2017 12:01AM ROSALES MARQUES   [Sep 25, 2017 8:19AM ROSALES MARQUES]  negative urine culture     Test Name Result Flag Reference   URINE CULTURE  O    SPECIMEN DESCRIPTION (SDES): URINE, CLEAN CATCH/MIDSTREAM  CULTURE (CULT):        <10,000 CFU/mL MIXED BACTERIAL PEE WITH NO PREDOMINATING TYPE  REPORT STATUS (RPT):     FINAL 09/21/2017     Prenatal Panel H (SYPIGG, CBCNO, DIFA, RUBEL, HBAG, ABRHSN, HIVSCR) 19Sep2017 12:01AM ROSALES MARQUES   [Sep 25, 2017 8:20AM ROSALES MARQUES]  Preantal labs including hemoglobin and platelet count     Test Name Result Flag Reference   WHITE BLOOD COUNT 13.7 K/mcL H 4.2-11.0   RED CELL COUNT 4.52 mil/mcL  4.00-5.20   HEMOGLOBIN 13.7 g/dl  12.0-15.5   HEMATOCRIT 40.0 %  36.0-46.5   MEAN CORPUSCULAR VOLUME 88.5 fL  78.0-100.0   MEAN CORPUSCULAR HEMOGLOBIN 30.3 pg  26.0-34.0   MEAN CORPUSCULAR HGB CONC 34.3 g/dl  32.0-36.5   RDW-CV 13.5 %  11.0-15.0   PLATELET COUNT 319 K/mcL  140-450   DIFF TYPE      AUTOMATED DIFFERENTIAL   RC% 72 %     LYM% 17 %     MON% 10 %     EOS% 1 %     BASO% 0 %     RC ABS 9.9 K/mcL H 1.8-7.7   LYM ABS 2.3 K/mcL  1.0-4.8   MON ABS 1.3 K/mcL H 0.3-0.9   EOS ABS 0.1 K/mcL  0.1-0.5   BASO ABS 0.0 K/mcL  0.0-0.3   Hepatitis B Surface Ag NEGATIVE  NEGATIVE   RUBELLA ANTIBODY, IGG 39.3 UNITS/ML  >9.9   <5.0 Units/mL = Negative for IgG antibodies (Non Immune)  5.0 to 9.9 Units/mL = Equivocal (Non Immune)  >9.9 Units/mL = Immune     Result does not represent an antibody titer.   TREPONEMA PALLIDUM IGG AB NONREACTIVE  NONREACTIVE   See Directory of  Services for interpretation of syphilis testing algorithm.   HIV ANTIGEN/ANTIBODY SCREEN NONREACTIVE  NONREACTIVE   ABO/RH(D) O POSITIVE     ANTIBODY SCREEN NEGATIVE       COMP METABOLIC PNL [id number] 12:01AM ALENAMENA ROSALES   [Sep 25, 2017 8:21AM ALENAMENA ROSALES]  Normal electrolytes. NO further intervention needed unless patient continues to vomit.     Test Name Result Flag Reference   FASTING STATUS UNKNOWN hrs     SODIUM 140 mmol/L  135-145   POTASSIUM 4.1 mmol/L  3.4-5.1   CHLORIDE 105 mmol/L     CARBON DIOXIDE 21 mmol/L  21-32   ANION GAP 18 mmol/L  10-20   GLUCOSE 81 mg/dl  65-99   BUN 10 mg/dl  6-20   CREATININE 0.63 mg/dl  0.51-0.95   GFR EST.AFRICAN AMER >90     eGFR results = or >90 mL/min/1.73m2 = Normal kidney function.   GFR EST.NONAFRI AMER >90     eGFR results = or >90 mL/min/1.73m2 = Normal kidney function.   BUN/CREATININE RATIO 16  7-25   CALCIUM 9.2 mg/dl  8.4-10.2   BILIRUBIN TOTAL 0.2 mg/dl  0.2-1.0   GOT/AST 19 Units/L  <38   GPT/ALT 45 Units/L  <79   ALKALINE PHOSPHATASE 84 Units/L     TOTAL PROTEIN 7.0 g/dl  6.4-8.2   ALBUMIN 3.6 g/dl  3.6-5.1   GLOBULIN (CALCULATED) 3.4 g/dl  2.0-4.0   A/G RATIO 1.1  1.0-2.4     TSH WITH REFLEX [id number] 12:01AM OKTAMANNANelidaLUZMENA ROSALES   [Sep 25, 2017 8:22AM OKCROWMENA ROSALES]  normal TSH     Test Name Result Flag Reference   TSH 0.680 mcUnits/mL  0.350-5.000   Findings most consistent with euthyroid state, no additional testing suggested. TSH may be normal in patients with thyroid dysfunction and pituitary disease. Clinical correlation recommended.  (Reflex TSH algorithm is not recommended in hospitalized patients. A variety of drugs, as well as serious acute and chronic illnesses may alter thyroid function tests. Commonly implicated drugs include glucocorticoids, dopamine, carbamazepine, iodine, amiodarone, lithium and heparin.)        None known

## 2018-11-22 NOTE — PROGRESS NOTE ADULT - REASON FOR ADMISSION
Wound infection
fever
Wound infection

## 2018-11-26 ENCOUNTER — CHART COPY (OUTPATIENT)
Age: 78
End: 2018-11-26

## 2018-11-26 NOTE — ED PROVIDER NOTE - CARDIAC, MLM
O-T Plasty Text: The defect edges were debeveled with a #15 scalpel blade.  Given the location of the defect, shape of the defect and the proximity to free margins an O-T plasty was deemed most appropriate.  Using a sterile surgical marker, an appropriate O-T plasty was drawn incorporating the defect and placing the expected incisions within the relaxed skin tension lines where possible.    The area thus outlined was incised deep to adipose tissue with a #15 scalpel blade.  The skin margins were undermined to an appropriate distance in all directions utilizing iris scissors. Show Surgeon Variable: Yes Complex Repair And Transposition Flap Text: The defect edges were debeveled with a #15 scalpel blade.  The primary defect was closed partially with a complex linear closure.  Given the location of the remaining defect, shape of the defect and the proximity to free margins a transposition flap was deemed most appropriate for complete closure of the defect.  Using a sterile surgical marker, an appropriate advancement flap was drawn incorporating the defect and placing the expected incisions within the relaxed skin tension lines where possible.    The area thus outlined was incised deep to adipose tissue with a #15 scalpel blade.  The skin margins were undermined to an appropriate distance in all directions utilizing iris scissors. Path Notes (To The Dermatopathologist): Please check margins. Skin Substitute Units (Will Override Primary Defect Units If Greater Than 0): 0 Transposition Flap Text: The defect edges were debeveled with a #15 scalpel blade.  Given the location of the defect and the proximity to free margins a transposition flap was deemed most appropriate.  Using a sterile surgical marker, an appropriate transposition flap was drawn incorporating the defect.    The area thus outlined was incised deep to adipose tissue with a #15 scalpel blade.  The skin margins were undermined to an appropriate distance in all directions utilizing iris scissors. Anesthesia Volume In Cc: 12 Curettage Prior To Excision?: No Positioning (Leave Blank If You Do Not Want): The patient was placed in a comfortable position exposing the surgical site. Epidermal Autograft Text: The defect edges were debeveled with a #15 scalpel blade.  Given the location of the defect, shape of the defect and the proximity to free margins an epidermal autograft was deemed most appropriate.  Using a sterile surgical marker, the primary defect shape was transferred to the donor site. The epidermal graft was then harvested.  The skin graft was then placed in the primary defect and oriented appropriately. Pre-Excision Curettage Text (Leave Blank If You Do Not Want): Prior to drawing the surgical margin the visible lesion was removed with electrodesiccation and curettage to clearly define the lesion size. Dermal Autograft Text: The defect edges were debeveled with a #15 scalpel blade.  Given the location of the defect, shape of the defect and the proximity to free margins a dermal autograft was deemed most appropriate.  Using a sterile surgical marker, the primary defect shape was transferred to the donor site. The area thus outlined was incised deep to adipose tissue with a #15 scalpel blade.  The harvested graft was then trimmed of adipose and epidermal tissue until only dermis was left.  The skin graft was then placed in the primary defect and oriented appropriately. Repair Performed By Another Provider Text (Leave Blank If You Do Not Want): After the tissue was excised the defect was repaired by another provider. O-Z Plasty Text: The defect edges were debeveled with a #15 scalpel blade.  Given the location of the defect, shape of the defect and the proximity to free margins an O-Z plasty (double transposition flap) was deemed most appropriate.  Using a sterile surgical marker, the appropriate transposition flaps were drawn incorporating the defect and placing the expected incisions within the relaxed skin tension lines where possible.    The area thus outlined was incised deep to adipose tissue with a #15 scalpel blade.  The skin margins were undermined to an appropriate distance in all directions utilizing iris scissors.  Hemostasis was achieved with electrocautery.  The flaps were then transposed into place, one clockwise and the other counterclockwise, and anchored with interrupted buried subcutaneous sutures. Complex Repair And V-Y Plasty Text: The defect edges were debeveled with a #15 scalpel blade.  The primary defect was closed partially with a complex linear closure.  Given the location of the remaining defect, shape of the defect and the proximity to free margins a V-Y plasty was deemed most appropriate for complete closure of the defect.  Using a sterile surgical marker, an appropriate advancement flap was drawn incorporating the defect and placing the expected incisions within the relaxed skin tension lines where possible.    The area thus outlined was incised deep to adipose tissue with a #15 scalpel blade.  The skin margins were undermined to an appropriate distance in all directions utilizing iris scissors. Hemostasis: Electrocautery Repair Type: Repair performed by another provider Muscle Hinge Flap Text: The defect edges were debeveled with a #15 scalpel blade.  Given the size, depth and location of the defect and the proximity to free margins a muscle hinge flap was deemed most appropriate.  Using a sterile surgical marker, an appropriate hinge flap was drawn incorporating the defect. The area thus outlined was incised with a #15 scalpel blade.  The skin margins were undermined to an appropriate distance in all directions utilizing iris scissors. Melolabial Transposition Flap Text: The defect edges were debeveled with a #15 scalpel blade.  Given the location of the defect and the proximity to free margins a melolabial flap was deemed most appropriate.  Using a sterile surgical marker, an appropriate melolabial transposition flap was drawn incorporating the defect.    The area thus outlined was incised deep to adipose tissue with a #15 scalpel blade.  The skin margins were undermined to an appropriate distance in all directions utilizing iris scissors. No Repair - Repaired With Adjacent Surgical Defect Text (Leave Blank If You Do Not Want): After the excision the defect was repaired concurrently with another surgical defect which was in close approximation. Skin Substitute Text: The defect edges were debeveled with a #15 scalpel blade.  Given the location of the defect, shape of the defect and the proximity to free margins a skin substitute graft was deemed most appropriate.  The graft material was trimmed to fit the size of the defect. The graft was then placed in the primary defect and oriented appropriately. S Plasty Text: Given the location and shape of the defect, and the orientation of relaxed skin tension lines, an S-plasty was deemed most appropriate for repair.  Using a sterile surgical marker, the appropriate outline of the S-plasty was drawn, incorporating the defect and placing the expected incisions within the relaxed skin tension lines where possible.  The area thus outlined was incised deep to adipose tissue with a #15 scalpel blade.  The skin margins were undermined to an appropriate distance in all directions utilizing iris scissors. The skin flaps were advanced over the defect.  The opposing margins were then approximated with interrupted buried subcutaneous sutures. Complex Repair And M Plasty Text: The defect edges were debeveled with a #15 scalpel blade.  The primary defect was closed partially with a complex linear closure.  Given the location of the remaining defect, shape of the defect and the proximity to free margins an M plasty was deemed most appropriate for complete closure of the defect.  Using a sterile surgical marker, an appropriate advancement flap was drawn incorporating the defect and placing the expected incisions within the relaxed skin tension lines where possible.    The area thus outlined was incised deep to adipose tissue with a #15 scalpel blade.  The skin margins were undermined to an appropriate distance in all directions utilizing iris scissors. Complex Repair Preamble Text (Leave Blank If You Do Not Want): Extensive wide undermining was performed. Estimated Blood Loss (Cc): minimal V-Y Plasty Text: The defect edges were debeveled with a #15 scalpel blade.  Given the location of the defect, shape of the defect and the proximity to free margins an V-Y advancement flap was deemed most appropriate.  Using a sterile surgical marker, an appropriate advancement flap was drawn incorporating the defect and placing the expected incisions within the relaxed skin tension lines where possible.    The area thus outlined was incised deep to adipose tissue with a #15 scalpel blade.  The skin margins were undermined to an appropriate distance in all directions utilizing iris scissors. Intermediate Repair Preamble Text (Leave Blank If You Do Not Want): Undermining was performed with blunt dissection. Complex Repair And Double M Plasty Text: The defect edges were debeveled with a #15 scalpel blade.  The primary defect was closed partially with a complex linear closure.  Given the location of the remaining defect, shape of the defect and the proximity to free margins a double M plasty was deemed most appropriate for complete closure of the defect.  Using a sterile surgical marker, an appropriate advancement flap was drawn incorporating the defect and placing the expected incisions within the relaxed skin tension lines where possible.    The area thus outlined was incised deep to adipose tissue with a #15 scalpel blade.  The skin margins were undermined to an appropriate distance in all directions utilizing iris scissors. Consent was obtained from the patient. The risks and benefits to therapy were discussed in detail. Specifically, the risks of infection, scarring, bleeding, prolonged wound healing, incomplete removal, allergy to anesthesia, nerve injury and recurrence were addressed. Prior to the procedure, the treatment site was clearly identified and confirmed by the patient. All components of Universal Protocol/PAUSE Rule completed. Advancement Flap (Single) Text: The defect edges were debeveled with a #15 scalpel blade.  Given the location of the defect and the proximity to free margins a single advancement flap was deemed most appropriate.  Using a sterile surgical marker, an appropriate advancement flap was drawn incorporating the defect and placing the expected incisions within the relaxed skin tension lines where possible.    The area thus outlined was incised deep to adipose tissue with a #15 scalpel blade.  The skin margins were undermined to an appropriate distance in all directions utilizing iris scissors. Rhombic Flap Text: The defect edges were debeveled with a #15 scalpel blade.  Given the location of the defect and the proximity to free margins a rhombic flap was deemed most appropriate.  Using a sterile surgical marker, an appropriate rhombic flap was drawn incorporating the defect.    The area thus outlined was incised deep to adipose tissue with a #15 scalpel blade.  The skin margins were undermined to an appropriate distance in all directions utilizing iris scissors. Tissue Cultured Epidermal Autograft Text: The defect edges were debeveled with a #15 scalpel blade.  Given the location of the defect, shape of the defect and the proximity to free margins a tissue cultured epidermal autograft was deemed most appropriate.  The graft was then trimmed to fit the size of the defect.  The graft was then placed in the primary defect and oriented appropriately. Xenograft Text: The defect edges were debeveled with a #15 scalpel blade.  Given the location of the defect, shape of the defect and the proximity to free margins a xenograft was deemed most appropriate.  The graft was then trimmed to fit the size of the defect.  The graft was then placed in the primary defect and oriented appropriately. Advancement Flap (Double) Text: The defect edges were debeveled with a #15 scalpel blade.  Given the location of the defect and the proximity to free margins a double advancement flap was deemed most appropriate.  Using a sterile surgical marker, the appropriate advancement flaps were drawn incorporating the defect and placing the expected incisions within the relaxed skin tension lines where possible.    The area thus outlined was incised deep to adipose tissue with a #15 scalpel blade.  The skin margins were undermined to an appropriate distance in all directions utilizing iris scissors. Complex Repair And W Plasty Text: The defect edges were debeveled with a #15 scalpel blade.  The primary defect was closed partially with a complex linear closure.  Given the location of the remaining defect, shape of the defect and the proximity to free margins a W plasty was deemed most appropriate for complete closure of the defect.  Using a sterile surgical marker, an appropriate advancement flap was drawn incorporating the defect and placing the expected incisions within the relaxed skin tension lines where possible.    The area thus outlined was incised deep to adipose tissue with a #15 scalpel blade.  The skin margins were undermined to an appropriate distance in all directions utilizing iris scissors. Lab: [id number] H Plasty Text: Given the location of the defect, shape of the defect and the proximity to free margins a H-plasty was deemed most appropriate for repair.  Using a sterile surgical marker, the appropriate advancement arms of the H-plasty were drawn incorporating the defect and placing the expected incisions within the relaxed skin tension lines where possible. The area thus outlined was incised deep to adipose tissue with a #15 scalpel blade. The skin margins were undermined to an appropriate distance in all directions utilizing iris scissors.  The opposing advancement arms were then advanced into place in opposite direction and anchored with interrupted buried subcutaneous sutures. Excision Depth: adipose tissue Bi-Rhombic Flap Text: The defect edges were debeveled with a #15 scalpel blade.  Given the location of the defect and the proximity to free margins a bi-rhombic flap was deemed most appropriate.  Using a sterile surgical marker, an appropriate rhombic flap was drawn incorporating the defect. The area thus outlined was incised deep to adipose tissue with a #15 scalpel blade.  The skin margins were undermined to an appropriate distance in all directions utilizing iris scissors. Normal rate, regular rhythm.  Heart sounds S1, S2.  No murmurs, rubs or gallops. Helical Rim Advancement Flap Text: The defect edges were debeveled with a #15 blade scalpel.  Given the location of the defect and the proximity to free margins (helical rim) a double helical rim advancement flap was deemed most appropriate.  Using a sterile surgical marker, the appropriate advancement flaps were drawn incorporating the defect and placing the expected incisions between the helical rim and antihelix where possible.  The area thus outlined was incised through and through with a #15 scalpel blade.  With a skin hook and iris scissors, the flaps were gently and sharply undermined and freed up. Complex Repair And Z Plasty Text: The defect edges were debeveled with a #15 scalpel blade.  The primary defect was closed partially with a complex linear closure.  Given the location of the remaining defect, shape of the defect and the proximity to free margins a Z plasty was deemed most appropriate for complete closure of the defect.  Using a sterile surgical marker, an appropriate advancement flap was drawn incorporating the defect and placing the expected incisions within the relaxed skin tension lines where possible.    The area thus outlined was incised deep to adipose tissue with a #15 scalpel blade.  The skin margins were undermined to an appropriate distance in all directions utilizing iris scissors. Burow's Advancement Flap Text: The defect edges were debeveled with a #15 scalpel blade.  Given the location of the defect and the proximity to free margins a Burow's advancement flap was deemed most appropriate.  Using a sterile surgical marker, the appropriate advancement flap was drawn incorporating the defect and placing the expected incisions within the relaxed skin tension lines where possible.    The area thus outlined was incised deep to adipose tissue with a #15 scalpel blade.  The skin margins were undermined to an appropriate distance in all directions utilizing iris scissors. Purse String (Intermediate) Text: Given the location of the defect and the characteristics of the surrounding skin a pursestring intermediate closure was deemed most appropriate.  Undermining was performed circumfirentially around the surgical defect.  A purstring suture was then placed and tightened. Graft Donor Site Bandage (Optional-Leave Blank If You Don't Want In Note): Steri-strips and a pressure bandage were applied to the donor site. Post-Care Instructions: I reviewed with the patient in detail post-care instructions. Patient is not to engage in any heavy lifting, exercise, or swimming for the next 14 days. Should the patient develop any fevers, chills, bleeding, severe pain patient will contact the office immediately. W Plasty Text: The lesion was extirpated to the level of the fat with a #15 scalpel blade.  Given the location of the defect, shape of the defect and the proximity to free margins a W-plasty was deemed most appropriate for repair.  Using a sterile surgical marker, the appropriate transposition arms of the W-plasty were drawn incorporating the defect and placing the expected incisions within the relaxed skin tension lines where possible.    The area thus outlined was incised deep to adipose tissue with a #15 scalpel blade.  The skin margins were undermined to an appropriate distance in all directions utilizing iris scissors.  The opposing transposition arms were then transposed into place in opposite direction and anchored with interrupted buried subcutaneous sutures. Z Plasty Text: The lesion was extirpated to the level of the fat with a #15 scalpel blade.  Given the location of the defect, shape of the defect and the proximity to free margins a Z-plasty was deemed most appropriate for repair.  Using a sterile surgical marker, the appropriate transposition arms of the Z-plasty were drawn incorporating the defect and placing the expected incisions within the relaxed skin tension lines where possible.    The area thus outlined was incised deep to adipose tissue with a #15 scalpel blade.  The skin margins were undermined to an appropriate distance in all directions utilizing iris scissors.  The opposing transposition arms were then transposed into place in opposite direction and anchored with interrupted buried subcutaneous sutures. Bilateral Helical Rim Advancement Flap Text: The defect edges were debeveled with a #15 blade scalpel.  Given the location of the defect and the proximity to free margins (helical rim) a bilateral helical rim advancement flap was deemed most appropriate.  Using a sterile surgical marker, the appropriate advancement flaps were drawn incorporating the defect and placing the expected incisions between the helical rim and antihelix where possible.  The area thus outlined was incised through and through with a #15 scalpel blade.  With a skin hook and iris scissors, the flaps were gently and sharply undermined and freed up. Complex Repair And Dorsal Nasal Flap Text: The defect edges were debeveled with a #15 scalpel blade.  The primary defect was closed partially with a complex linear closure.  Given the location of the remaining defect, shape of the defect and the proximity to free margins a dorsal nasal flap was deemed most appropriate for complete closure of the defect.  Using a sterile surgical marker, an appropriate flap was drawn incorporating the defect and placing the expected incisions within the relaxed skin tension lines where possible.    The area thus outlined was incised deep to adipose tissue with a #15 scalpel blade.  The skin margins were undermined to an appropriate distance in all directions utilizing iris scissors. Crescentic Advancement Flap Text: The defect edges were debeveled with a #15 scalpel blade.  Given the location of the defect and the proximity to free margins a crescentic advancement flap was deemed most appropriate.  Using a sterile surgical marker, the appropriate advancement flap was drawn incorporating the defect and placing the expected incisions within the relaxed skin tension lines where possible.    The area thus outlined was incised deep to adipose tissue with a #15 scalpel blade.  The skin margins were undermined to an appropriate distance in all directions utilizing iris scissors. Purse String (Simple) Text: Given the location of the defect and the characteristics of the surrounding skin a purse string simple closure was deemed most appropriate.  Undermining was performed circumferentially around the surgical defect.  A purse string suture was then placed and tightened. Partial Purse String (Intermediate) Text: Given the location of the defect and the characteristics of the surrounding skin an intermediate purse string closure was deemed most appropriate.  Undermining was performed circumferentially around the surgical defect.  A purse string suture was then placed and tightened. Wound tension of the circular defect prevented complete closure of the wound. A-T Advancement Flap Text: The defect edges were debeveled with a #15 scalpel blade.  Given the location of the defect, shape of the defect and the proximity to free margins an A-T advancement flap was deemed most appropriate.  Using a sterile surgical marker, an appropriate advancement flap was drawn incorporating the defect and placing the expected incisions within the relaxed skin tension lines where possible.    The area thus outlined was incised deep to adipose tissue with a #15 scalpel blade.  The skin margins were undermined to an appropriate distance in all directions utilizing iris scissors. Scalpel Size: 15 blade Detail Level: Detailed Home Suture Removal Text: Patient was provided a home suture removal kit and will remove their sutures at home.  If they have any questions or difficulties they will call the office. Cheek Interpolation Flap Text: A decision was made to reconstruct the defect utilizing an interpolation axial flap and a staged reconstruction.  A telfa template was made of the defect.  This telfa template was then used to outline the Cheek Interpolation flap.  The donor area for the pedicle flap was then injected with anesthesia.  The flap was excised through the skin and subcutaneous tissue down to the layer of the underlying musculature.  The interpolation flap was carefully excised within this deep plane to maintain its blood supply.  The edges of the donor site were undermined.   The donor site was closed in a primary fashion.  The pedicle was then rotated into position and sutured.  Once the tube was sutured into place, adequate blood supply was confirmed with blanching and refill.  The pedicle was then wrapped with xeroform gauze and dressed appropriately with a telfa and gauze bandage to ensure continued blood supply and protect the attached pedicle. Epidermal Sutures: 4-0 Nylon Complex Repair And Ftsg Text: The defect edges were debeveled with a #15 scalpel blade.  The primary defect was closed partially with a complex linear closure.  Given the location of the defect, shape of the defect and the proximity to free margins a full thickness skin graft was deemed most appropriate to repair the remaining defect.  The graft was trimmed to fit the size of the remaining defect.  The graft was then placed in the primary defect, oriented appropriately, and sutured into place. Ear Star Wedge Flap Text: The defect edges were debeveled with a #15 blade scalpel.  Given the location of the defect and the proximity to free margins (helical rim) an ear star wedge flap was deemed most appropriate.  Using a sterile surgical marker, the appropriate flap was drawn incorporating the defect and placing the expected incisions between the helical rim and antihelix where possible.  The area thus outlined was incised through and through with a #15 scalpel blade. Banner Transposition Flap Text: The defect edges were debeveled with a #15 scalpel blade.  Given the location of the defect and the proximity to free margins a Banner transposition flap was deemed most appropriate.  Using a sterile surgical marker, an appropriate flap drawn around the defect. The area thus outlined was incised deep to adipose tissue with a #15 scalpel blade.  The skin margins were undermined to an appropriate distance in all directions utilizing iris scissors. O-T Advancement Flap Text: The defect edges were debeveled with a #15 scalpel blade.  Given the location of the defect, shape of the defect and the proximity to free margins an O-T advancement flap was deemed most appropriate.  Using a sterile surgical marker, an appropriate advancement flap was drawn incorporating the defect and placing the expected incisions within the relaxed skin tension lines where possible.    The area thus outlined was incised deep to adipose tissue with a #15 scalpel blade.  The skin margins were undermined to an appropriate distance in all directions utilizing iris scissors. Complex Repair And Split-Thickness Skin Graft Text: The defect edges were debeveled with a #15 scalpel blade.  The primary defect was closed partially with a complex linear closure.  Given the location of the defect, shape of the defect and the proximity to free margins a split thickness skin graft was deemed most appropriate to repair the remaining defect.  The graft was trimmed to fit the size of the remaining defect.  The graft was then placed in the primary defect, oriented appropriately, and sutured into place. Partial Purse String (Simple) Text: Given the location of the defect and the characteristics of the surrounding skin a simple purse string closure was deemed most appropriate.  Undermining was performed circumferentially around the surgical defect.  A purse string suture was then placed and tightened. Wound tension of the circular defect prevented complete closure of the wound. Cheek-To-Nose Interpolation Flap Text: A decision was made to reconstruct the defect utilizing an interpolation axial flap and a staged reconstruction.  A telfa template was made of the defect.  This telfa template was then used to outline the Cheek-To-Nose Interpolation flap.  The donor area for the pedicle flap was then injected with anesthesia.  The flap was excised through the skin and subcutaneous tissue down to the layer of the underlying musculature.  The interpolation flap was carefully excised within this deep plane to maintain its blood supply.  The edges of the donor site were undermined.   The donor site was closed in a primary fashion.  The pedicle was then rotated into position and sutured.  Once the tube was sutured into place, adequate blood supply was confirmed with blanching and refill.  The pedicle was then wrapped with xeroform gauze and dressed appropriately with a telfa and gauze bandage to ensure continued blood supply and protect the attached pedicle. Interpolation Flap Text: A decision was made to reconstruct the defect utilizing an interpolation axial flap and a staged reconstruction.  A telfa template was made of the defect.  This telfa template was then used to outline the interpolation flap.  The donor area for the pedicle flap was then injected with anesthesia.  The flap was excised through the skin and subcutaneous tissue down to the layer of the underlying musculature.  The interpolation flap was carefully excised within this deep plane to maintain its blood supply.  The edges of the donor site were undermined.   The donor site was closed in a primary fashion.  The pedicle was then rotated into position and sutured.  Once the tube was sutured into place, adequate blood supply was confirmed with blanching and refill.  The pedicle was then wrapped with xeroform gauze and dressed appropriately with a telfa and gauze bandage to ensure continued blood supply and protect the attached pedicle. Bilobed Flap Text: The defect edges were debeveled with a #15 scalpel blade.  Given the location of the defect and the proximity to free margins a bilobe flap was deemed most appropriate.  Using a sterile surgical marker, an appropriate bilobe flap drawn around the defect.    The area thus outlined was incised deep to adipose tissue with a #15 scalpel blade.  The skin margins were undermined to an appropriate distance in all directions utilizing iris scissors. Complex Repair And Epidermal Autograft Text: The defect edges were debeveled with a #15 scalpel blade.  The primary defect was closed partially with a complex linear closure.  Given the location of the defect, shape of the defect and the proximity to free margins an epidermal autograft was deemed most appropriate to repair the remaining defect.  The graft was trimmed to fit the size of the remaining defect.  The graft was then placed in the primary defect, oriented appropriately, and sutured into place. O-L Flap Text: The defect edges were debeveled with a #15 scalpel blade.  Given the location of the defect, shape of the defect and the proximity to free margins an O-L flap was deemed most appropriate.  Using a sterile surgical marker, an appropriate advancement flap was drawn incorporating the defect and placing the expected incisions within the relaxed skin tension lines where possible.    The area thus outlined was incised deep to adipose tissue with a #15 scalpel blade.  The skin margins were undermined to an appropriate distance in all directions utilizing iris scissors. Complex Repair And Single Advancement Flap Text: The defect edges were debeveled with a #15 scalpel blade.  The primary defect was closed partially with a complex linear closure.  Given the location of the remaining defect, shape of the defect and the proximity to free margins a single advancement flap was deemed most appropriate for complete closure of the defect.  Using a sterile surgical marker, an appropriate advancement flap was drawn incorporating the defect and placing the expected incisions within the relaxed skin tension lines where possible.    The area thus outlined was incised deep to adipose tissue with a #15 scalpel blade.  The skin margins were undermined to an appropriate distance in all directions utilizing iris scissors. Complex Repair And Double Advancement Flap Text: The defect edges were debeveled with a #15 scalpel blade.  The primary defect was closed partially with a complex linear closure.  Given the location of the remaining defect, shape of the defect and the proximity to free margins a double advancement flap was deemed most appropriate for complete closure of the defect.  Using a sterile surgical marker, an appropriate advancement flap was drawn incorporating the defect and placing the expected incisions within the relaxed skin tension lines where possible.    The area thus outlined was incised deep to adipose tissue with a #15 scalpel blade.  The skin margins were undermined to an appropriate distance in all directions utilizing iris scissors. V-Y Flap Text: The defect edges were debeveled with a #15 scalpel blade.  Given the location of the defect, shape of the defect and the proximity to free margins a V-Y flap was deemed most appropriate.  Using a sterile surgical marker, an appropriate advancement flap was drawn incorporating the defect and placing the expected incisions within the relaxed skin tension lines where possible.    The area thus outlined was incised deep to adipose tissue with a #15 scalpel blade.  The skin margins were undermined to an appropriate distance in all directions utilizing iris scissors. Melolabial Interpolation Flap Text: A decision was made to reconstruct the defect utilizing an interpolation axial flap and a staged reconstruction.  A telfa template was made of the defect.  This telfa template was then used to outline the melolabial interpolation flap.  The donor area for the pedicle flap was then injected with anesthesia.  The flap was excised through the skin and subcutaneous tissue down to the layer of the underlying musculature.  The pedicle flap was carefully excised within this deep plane to maintain its blood supply.  The edges of the donor site were undermined.   The donor site was closed in a primary fashion.  The pedicle was then rotated into position and sutured.  Once the tube was sutured into place, adequate blood supply was confirmed with blanching and refill.  The pedicle was then wrapped with xeroform gauze and dressed appropriately with a telfa and gauze bandage to ensure continued blood supply and protect the attached pedicle. Information: Selecting Yes will display possible errors in your note based on the variables you have selected. This validation is only offered as a suggestion for you. PLEASE NOTE THAT THE VALIDATION TEXT WILL BE REMOVED WHEN YOU FINALIZE YOUR NOTE. IF YOU WANT TO FAX A PRELIMINARY NOTE YOU WILL NEED TO TOGGLE THIS TO 'NO' IF YOU DO NOT WANT IT IN YOUR FAXED NOTE. Complex Repair And Dermal Autograft Text: The defect edges were debeveled with a #15 scalpel blade.  The primary defect was closed partially with a complex linear closure.  Given the location of the defect, shape of the defect and the proximity to free margins an dermal autograft was deemed most appropriate to repair the remaining defect.  The graft was trimmed to fit the size of the remaining defect.  The graft was then placed in the primary defect, oriented appropriately, and sutured into place. Bilobed Transposition Flap Text: The defect edges were debeveled with a #15 scalpel blade.  Given the location of the defect and the proximity to free margins a bilobed transposition flap was deemed most appropriate.  Using a sterile surgical marker, an appropriate bilobe flap drawn around the defect.    The area thus outlined was incised deep to adipose tissue with a #15 scalpel blade.  The skin margins were undermined to an appropriate distance in all directions utilizing iris scissors. Mastoid Interpolation Flap Text: A decision was made to reconstruct the defect utilizing an interpolation axial flap and a staged reconstruction.  A telfa template was made of the defect.  This telfa template was then used to outline the mastoid interpolation flap.  The donor area for the pedicle flap was then injected with anesthesia.  The flap was excised through the skin and subcutaneous tissue down to the layer of the underlying musculature.  The pedicle flap was carefully excised within this deep plane to maintain its blood supply.  The edges of the donor site were undermined.   The donor site was closed in a primary fashion.  The pedicle was then rotated into position and sutured.  Once the tube was sutured into place, adequate blood supply was confirmed with blanching and refill.  The pedicle was then wrapped with xeroform gauze and dressed appropriately with a telfa and gauze bandage to ensure continued blood supply and protect the attached pedicle. Complex Repair And Tissue Cultured Epidermal Autograft Text: The defect edges were debeveled with a #15 scalpel blade.  The primary defect was closed partially with a complex linear closure.  Given the location of the defect, shape of the defect and the proximity to free margins an tissue cultured epidermal autograft was deemed most appropriate to repair the remaining defect.  The graft was trimmed to fit the size of the remaining defect.  The graft was then placed in the primary defect, oriented appropriately, and sutured into place. Trilobed Flap Text: The defect edges were debeveled with a #15 scalpel blade.  Given the location of the defect and the proximity to free margins a trilobed flap was deemed most appropriate.  Using a sterile surgical marker, an appropriate trilobed flap drawn around the defect.    The area thus outlined was incised deep to adipose tissue with a #15 scalpel blade.  The skin margins were undermined to an appropriate distance in all directions utilizing iris scissors. Complex Repair And Modified Advancement Flap Text: The defect edges were debeveled with a #15 scalpel blade.  The primary defect was closed partially with a complex linear closure.  Given the location of the remaining defect, shape of the defect and the proximity to free margins a modified advancement flap was deemed most appropriate for complete closure of the defect.  Using a sterile surgical marker, an appropriate advancement flap was drawn incorporating the defect and placing the expected incisions within the relaxed skin tension lines where possible.    The area thus outlined was incised deep to adipose tissue with a #15 scalpel blade.  The skin margins were undermined to an appropriate distance in all directions utilizing iris scissors. Advancement-Rotation Flap Text: The defect edges were debeveled with a #15 scalpel blade.  Given the location of the defect, shape of the defect and the proximity to free margins an advancement-rotation flap was deemed most appropriate.  Using a sterile surgical marker, an appropriate flap was drawn incorporating the defect and placing the expected incisions within the relaxed skin tension lines where possible. The area thus outlined was incised deep to adipose tissue with a #15 scalpel blade.  The skin margins were undermined to an appropriate distance in all directions utilizing iris scissors. Curvilinear Excision Additional Text (Leave Blank If You Do Not Want): The margin was drawn around the clinically apparent lesion.  A curvilinear shape was then drawn on the skin incorporating the lesion and margins.  Incisions were then made along these lines to the appropriate tissue plane and the lesion was extirpated. Mercedes Flap Text: The defect edges were debeveled with a #15 scalpel blade.  Given the location of the defect, shape of the defect and the proximity to free margins a Mercedes flap was deemed most appropriate.  Using a sterile surgical marker, an appropriate advancement flap was drawn incorporating the defect and placing the expected incisions within the relaxed skin tension lines where possible. The area thus outlined was incised deep to adipose tissue with a #15 scalpel blade.  The skin margins were undermined to an appropriate distance in all directions utilizing iris scissors. Posterior Auricular Interpolation Flap Text: A decision was made to reconstruct the defect utilizing an interpolation axial flap and a staged reconstruction.  A telfa template was made of the defect.  This telfa template was then used to outline the posterior auricular interpolation flap.  The donor area for the pedicle flap was then injected with anesthesia.  The flap was excised through the skin and subcutaneous tissue down to the layer of the underlying musculature.  The pedicle flap was carefully excised within this deep plane to maintain its blood supply.  The edges of the donor site were undermined.   The donor site was closed in a primary fashion.  The pedicle was then rotated into position and sutured.  Once the tube was sutured into place, adequate blood supply was confirmed with blanching and refill.  The pedicle was then wrapped with xeroform gauze and dressed appropriately with a telfa and gauze bandage to ensure continued blood supply and protect the attached pedicle. Complex Repair And Xenograft Text: The defect edges were debeveled with a #15 scalpel blade.  The primary defect was closed partially with a complex linear closure.  Given the location of the defect, shape of the defect and the proximity to free margins an tissue cultured epidermal autograft was deemed most appropriate to repair the remaining defect.  The graft was trimmed to fit the size of the remaining defect.  The graft was then placed in the primary defect, oriented appropriately, and sutured into place. Dorsal Nasal Flap Text: The defect edges were debeveled with a #15 scalpel blade.  Given the location of the defect and the proximity to free margins a dorsal nasal flap was deemed most appropriate.  Using a sterile surgical marker, an appropriate dorsal nasal flap was drawn around the defect.    The area thus outlined was incised deep to adipose tissue with a #15 scalpel blade.  The skin margins were undermined to an appropriate distance in all directions utilizing iris scissors. Complex Repair And A-T Advancement Flap Text: The defect edges were debeveled with a #15 scalpel blade.  The primary defect was closed partially with a complex linear closure.  Given the location of the remaining defect, shape of the defect and the proximity to free margins an A-T advancement flap was deemed most appropriate for complete closure of the defect.  Using a sterile surgical marker, an appropriate advancement flap was drawn incorporating the defect and placing the expected incisions within the relaxed skin tension lines where possible.    The area thus outlined was incised deep to adipose tissue with a #15 scalpel blade.  The skin margins were undermined to an appropriate distance in all directions utilizing iris scissors. Anesthesia Type: 0.5% lidocaine with 1:200,000 epinephrine Complex Repair And O-T Advancement Flap Text: The defect edges were debeveled with a #15 scalpel blade.  The primary defect was closed partially with a complex linear closure.  Given the location of the remaining defect, shape of the defect and the proximity to free margins an O-T advancement flap was deemed most appropriate for complete closure of the defect.  Using a sterile surgical marker, an appropriate advancement flap was drawn incorporating the defect and placing the expected incisions within the relaxed skin tension lines where possible.    The area thus outlined was incised deep to adipose tissue with a #15 scalpel blade.  The skin margins were undermined to an appropriate distance in all directions utilizing iris scissors. Anesthesia Type: 1% lidocaine with epinephrine Modified Advancement Flap Text: The defect edges were debeveled with a #15 scalpel blade.  Given the location of the defect, shape of the defect and the proximity to free margins a modified advancement flap was deemed most appropriate.  Using a sterile surgical marker, an appropriate advancement flap was drawn incorporating the defect and placing the expected incisions within the relaxed skin tension lines where possible.    The area thus outlined was incised deep to adipose tissue with a #15 scalpel blade.  The skin margins were undermined to an appropriate distance in all directions utilizing iris scissors. Paramedian Forehead Flap Text: A decision was made to reconstruct the defect utilizing an interpolation axial flap and a staged reconstruction.  A telfa template was made of the defect.  This telfa template was then used to outline the paramedian forehead pedicle flap.  The donor area for the pedicle flap was then injected with anesthesia.  The flap was excised through the skin and subcutaneous tissue down to the layer of the underlying musculature.  The pedicle flap was carefully excised within this deep plane to maintain its blood supply.  The edges of the donor site were undermined.   The donor site was closed in a primary fashion.  The pedicle was then rotated into position and sutured.  Once the tube was sutured into place, adequate blood supply was confirmed with blanching and refill.  The pedicle was then wrapped with xeroform gauze and dressed appropriately with a telfa and gauze bandage to ensure continued blood supply and protect the attached pedicle. Island Pedicle Flap Text: The defect edges were debeveled with a #15 scalpel blade.  Given the location of the defect, shape of the defect and the proximity to free margins an island pedicle advancement flap was deemed most appropriate.  Using a sterile surgical marker, an appropriate advancement flap was drawn incorporating the defect, outlining the appropriate donor tissue and placing the expected incisions within the relaxed skin tension lines where possible.    The area thus outlined was incised deep to adipose tissue with a #15 scalpel blade.  The skin margins were undermined to an appropriate distance in all directions around the primary defect and laterally outward around the island pedicle utilizing iris scissors.  There was minimal undermining beneath the pedicle flap. Epidermal Closure: running horizontal mattress Complex Repair And Skin Substitute Graft Text: The defect edges were debeveled with a #15 scalpel blade.  The primary defect was closed partially with a complex linear closure.  Given the location of the remaining defect, shape of the defect and the proximity to free margins a skin substitute graft was deemed most appropriate to repair the remaining defect.  The graft was trimmed to fit the size of the remaining defect.  The graft was then placed in the primary defect, oriented appropriately, and sutured into place. Fusiform Excision Additional Text (Leave Blank If You Do Not Want): The margin was drawn around the clinically apparent lesion.  A fusiform shape was then drawn on the skin incorporating the lesion and margins.  Incisions were then made along these lines to the appropriate tissue plane and the lesion was extirpated. Dressing: pressure dressing with telfa Lip Wedge Excision Repair Text: Given the location of the defect and the proximity to free margins a full thickness wedge repair was deemed most appropriate.  Using a sterile surgical marker, the appropriate repair was drawn incorporating the defect and placing the expected incisions perpendicular to the vermillion border.  The vermillion border was also meticulously outlined to ensure appropriate reapproximation during the repair.  The area thus outlined was incised through and through with a #15 scalpel blade.  The muscularis and dermis were reaproximated with deep sutures following hemostasis. Care was taken to realign the vermillion border before proceeding with the superficial closure.  Once the vermillion was realigned the superfical and mucosal closure was finished. Elliptical Excision Additional Text (Leave Blank If You Do Not Want): The margin was drawn around the clinically apparent lesion.  An elliptical shape was then drawn on the skin incorporating the lesion and margins.  Incisions were then made along these lines to the appropriate tissue plane and the lesion was extirpated. Island Pedicle Flap With Canthal Suspension Text: The defect edges were debeveled with a #15 scalpel blade.  Given the location of the defect, shape of the defect and the proximity to free margins an island pedicle advancement flap was deemed most appropriate.  Using a sterile surgical marker, an appropriate advancement flap was drawn incorporating the defect, outlining the appropriate donor tissue and placing the expected incisions within the relaxed skin tension lines where possible. The area thus outlined was incised deep to adipose tissue with a #15 scalpel blade.  The skin margins were undermined to an appropriate distance in all directions around the primary defect and laterally outward around the island pedicle utilizing iris scissors.  There was minimal undermining beneath the pedicle flap. A suspension suture was placed in the canthal tendon to prevent tension and prevent ectropion. Complex Repair And O-L Flap Text: The defect edges were debeveled with a #15 scalpel blade.  The primary defect was closed partially with a complex linear closure.  Given the location of the remaining defect, shape of the defect and the proximity to free margins an O-L flap was deemed most appropriate for complete closure of the defect.  Using a sterile surgical marker, an appropriate flap was drawn incorporating the defect and placing the expected incisions within the relaxed skin tension lines where possible.    The area thus outlined was incised deep to adipose tissue with a #15 scalpel blade.  The skin margins were undermined to an appropriate distance in all directions utilizing iris scissors. Size Of Lesion In Cm: 1.8 Epidermal Closure Graft Donor Site (Optional): simple interrupted Mucosal Advancement Flap Text: Given the location of the defect, shape of the defect and the proximity to free margins a mucosal advancement flap was deemed most appropriate. Incisions were made with a 15 blade scalpel in the appropriate fashion along the cutaneous vermillion border and the mucosal lip. The remaining actinically damaged mucosal tissue was excised.  The mucosal advancement flap was then elevated to the gingival sulcus with care taken to preserve the neurovascular structures and advanced into the primary defect. Care was taken to ensure that precise realignment of the vermillion border was achieved. X Size Of Lesion In Cm (Optional): 1.2 Saucerization Excision Additional Text (Leave Blank If You Do Not Want): The margin was drawn around the clinically apparent lesion.  Incisions were then made along these lines, in a tangential fashion, to the appropriate tissue plane and the lesion was extirpated. Hatchet Flap Text: The defect edges were debeveled with a #15 scalpel blade.  Given the location of the defect, shape of the defect and the proximity to free margins a hatchet flap was deemed most appropriate.  Using a sterile surgical marker, an appropriate hatchet flap was drawn incorporating the defect and placing the expected incisions within the relaxed skin tension lines where possible.    The area thus outlined was incised deep to adipose tissue with a #15 scalpel blade.  The skin margins were undermined to an appropriate distance in all directions utilizing iris scissors. Ftsg Text: The defect edges were debeveled with a #15 scalpel blade.  Given the location of the defect, shape of the defect and the proximity to free margins a full thickness skin graft was deemed most appropriate.  Using a sterile surgical marker, the primary defect shape was transferred to the donor site. The area thus outlined was incised deep to adipose tissue with a #15 scalpel blade.  The harvested graft was then trimmed of adipose tissue until only dermis and epidermis was left.  The skin margins of the secondary defect were undermined to an appropriate distance in all directions utilizing iris scissors.  The secondary defect was closed with interrupted buried subcutaneous sutures.  The skin edges were then re-apposed with running  sutures.  The skin graft was then placed in the primary defect and oriented appropriately. Suture Removal: 14 days Alar Island Pedicle Flap Text: The defect edges were debeveled with a #15 scalpel blade.  Given the location of the defect, shape of the defect and the proximity to the alar rim an island pedicle advancement flap was deemed most appropriate.  Using a sterile surgical marker, an appropriate advancement flap was drawn incorporating the defect, outlining the appropriate donor tissue and placing the expected incisions within the nasal ala running parallel to the alar rim. The area thus outlined was incised with a #15 scalpel blade.  The skin margins were undermined minimally to an appropriate distance in all directions around the primary defect and laterally outward around the island pedicle utilizing iris scissors.  There was minimal undermining beneath the pedicle flap. Complex Repair And Bilobe Flap Text: The defect edges were debeveled with a #15 scalpel blade.  The primary defect was closed partially with a complex linear closure.  Given the location of the remaining defect, shape of the defect and the proximity to free margins a bilobe flap was deemed most appropriate for complete closure of the defect.  Using a sterile surgical marker, an appropriate advancement flap was drawn incorporating the defect and placing the expected incisions within the relaxed skin tension lines where possible.    The area thus outlined was incised deep to adipose tissue with a #15 scalpel blade.  The skin margins were undermined to an appropriate distance in all directions utilizing iris scissors. Complex Repair And Melolabial Flap Text: The defect edges were debeveled with a #15 scalpel blade.  The primary defect was closed partially with a complex linear closure.  Given the location of the remaining defect, shape of the defect and the proximity to free margins a melolabial flap was deemed most appropriate for complete closure of the defect.  Using a sterile surgical marker, an appropriate advancement flap was drawn incorporating the defect and placing the expected incisions within the relaxed skin tension lines where possible.    The area thus outlined was incised deep to adipose tissue with a #15 scalpel blade.  The skin margins were undermined to an appropriate distance in all directions utilizing iris scissors. Anesthesia Volume In Cc: 9 Rotation Flap Text: The defect edges were debeveled with a #15 scalpel blade.  Given the location of the defect, shape of the defect and the proximity to free margins a rotation flap was deemed most appropriate.  Using a sterile surgical marker, an appropriate rotation flap was drawn incorporating the defect and placing the expected incisions within the relaxed skin tension lines where possible.    The area thus outlined was incised deep to adipose tissue with a #15 scalpel blade.  The skin margins were undermined to an appropriate distance in all directions utilizing iris scissors. Size Of Margin In Cm: 0.5 Wound Care: Aquaphor Split-Thickness Skin Graft Text: The defect edges were debeveled with a #15 scalpel blade.  Given the location of the defect, shape of the defect and the proximity to free margins a split thickness skin graft was deemed most appropriate.  Using a sterile surgical marker, the primary defect shape was transferred to the donor site. The split thickness graft was then harvested.  The skin graft was then placed in the primary defect and oriented appropriately. Double Island Pedicle Flap Text: The defect edges were debeveled with a #15 scalpel blade.  Given the location of the defect, shape of the defect and the proximity to free margins a double island pedicle advancement flap was deemed most appropriate.  Using a sterile surgical marker, an appropriate advancement flap was drawn incorporating the defect, outlining the appropriate donor tissue and placing the expected incisions within the relaxed skin tension lines where possible.    The area thus outlined was incised deep to adipose tissue with a #15 scalpel blade.  The skin margins were undermined to an appropriate distance in all directions around the primary defect and laterally outward around the island pedicle utilizing iris scissors.  There was minimal undermining beneath the pedicle flap. Slit Excision Additional Text (Leave Blank If You Do Not Want): A linear line was drawn on the skin overlying the lesion. An incision was made slowly until the lesion was visualized.  Once visualized, the lesion was removed with blunt dissection. Cartilage Graft Text: The defect edges were debeveled with a #15 scalpel blade.  Given the location of the defect, shape of the defect, the fact the defect involved a full thickness cartilage defect a cartilage graft was deemed most appropriate.  An appropriate donor site was identified, cleansed, and anesthetized. The cartilage graft was then harvested and transferred to the recipient site, oriented appropriately and then sutured into place.  The secondary defect was then repaired using a primary closure. Excisional Biopsy Additional Text (Leave Blank If You Do Not Want): The margin was drawn around the clinically apparent lesion. An elliptical shape was then drawn on the skin incorporating the lesion and margins.  Incisions were then made along these lines to the appropriate tissue plane and the lesion was extirpated. Island Pedicle Flap-Requiring Vessel Identification Text: The defect edges were debeveled with a #15 scalpel blade.  Given the location of the defect, shape of the defect and the proximity to free margins an island pedicle advancement flap was deemed most appropriate.  Using a sterile surgical marker, an appropriate advancement flap was drawn, based on the axial vessel mentioned above, incorporating the defect, outlining the appropriate donor tissue and placing the expected incisions within the relaxed skin tension lines where possible.    The area thus outlined was incised deep to adipose tissue with a #15 scalpel blade.  The skin margins were undermined to an appropriate distance in all directions around the primary defect and laterally outward around the island pedicle utilizing iris scissors.  There was minimal undermining beneath the pedicle flap. Complex Repair And Rotation Flap Text: The defect edges were debeveled with a #15 scalpel blade.  The primary defect was closed partially with a complex linear closure.  Given the location of the remaining defect, shape of the defect and the proximity to free margins a rotation flap was deemed most appropriate for complete closure of the defect.  Using a sterile surgical marker, an appropriate advancement flap was drawn incorporating the defect and placing the expected incisions within the relaxed skin tension lines where possible.    The area thus outlined was incised deep to adipose tissue with a #15 scalpel blade.  The skin margins were undermined to an appropriate distance in all directions utilizing iris scissors. Body Location Override (Optional - Billing Will Still Be Based On Selected Body Map Location If Applicable): right temple Deep Sutures: 3-0 Vicryl Excision Method: Round Billing Type: Third-Party Bill Spiral Flap Text: The defect edges were debeveled with a #15 scalpel blade.  Given the location of the defect, shape of the defect and the proximity to free margins a spiral flap was deemed most appropriate.  Using a sterile surgical marker, an appropriate rotation flap was drawn incorporating the defect and placing the expected incisions within the relaxed skin tension lines where possible. The area thus outlined was incised deep to adipose tissue with a #15 scalpel blade.  The skin margins were undermined to an appropriate distance in all directions utilizing iris scissors. Star Wedge Flap Text: The defect edges were debeveled with a #15 scalpel blade.  Given the location of the defect, shape of the defect and the proximity to free margins a star wedge flap was deemed most appropriate.  Using a sterile surgical marker, an appropriate rotation flap was drawn incorporating the defect and placing the expected incisions within the relaxed skin tension lines where possible. The area thus outlined was incised deep to adipose tissue with a #15 scalpel blade.  The skin margins were undermined to an appropriate distance in all directions utilizing iris scissors. Perilesional Excision Additional Text (Leave Blank If You Do Not Want): The margin was drawn around the clinically apparent lesion. Incisions were then made along these lines to the appropriate tissue plane and the lesion was extirpated. Composite Graft Text: The defect edges were debeveled with a #15 scalpel blade.  Given the location of the defect, shape of the defect, the proximity to free margins and the fact the defect was full thickness a composite graft was deemed most appropriate.  The defect was outline and then transferred to the donor site.  A full thickness graft was then excised from the donor site. The graft was then placed in the primary defect, oriented appropriately and then sutured into place.  The secondary defect was then repaired using a primary closure. Keystone Flap Text: The defect edges were debeveled with a #15 scalpel blade.  Given the location of the defect, shape of the defect a keystone flap was deemed most appropriate.  Using a sterile surgical marker, an appropriate keystone flap was drawn incorporating the defect, outlining the appropriate donor tissue and placing the expected incisions within the relaxed skin tension lines where possible. The area thus outlined was incised deep to adipose tissue with a #15 scalpel blade.  The skin margins were undermined to an appropriate distance in all directions around the primary defect and laterally outward around the flap utilizing iris scissors. Complex Repair And Rhombic Flap Text: The defect edges were debeveled with a #15 scalpel blade.  The primary defect was closed partially with a complex linear closure.  Given the location of the remaining defect, shape of the defect and the proximity to free margins a rhombic flap was deemed most appropriate for complete closure of the defect.  Using a sterile surgical marker, an appropriate advancement flap was drawn incorporating the defect and placing the expected incisions within the relaxed skin tension lines where possible.    The area thus outlined was incised deep to adipose tissue with a #15 scalpel blade.  The skin margins were undermined to an appropriate distance in all directions utilizing iris scissors. Lab Facility: [id number]

## 2018-12-09 NOTE — PROGRESS NOTE ADULT - SUBJECTIVE AND OBJECTIVE BOX
Telephone Encounter by Leonor Aguilar RMA at 02/16/17 04:50 PM     Author:  Leonor Aguilar RMA Service:  (none) Author Type:  Certified Medical Assistant     Filed:  02/16/17 04:50 PM Encounter Date:  2/13/2017 Status:  Signed     :  Leonor Aguilar RMA (Certified Medical Assistant)            See [employer] message.[PR1.1M]      Revision History        User Key Date/Time User Provider Type Action    > PR1.1 02/16/17 04:50 PM Leonor Aguilar RMA Certified Medical Assistant Sign    M - Manual             [facility] Surgery Consult - Daily Progress Note    SUBJECTIVE:  Patient complaining of generalized pain this morning.  Denies fever or chills.   States that she does not want surgery to address her leg at this point.     OBJECTIVE:     ** VITAL SIGNS / I&O's **    T(C): 36.8 (11-14-18 @ 06:16), Max: 38.1 (11-13-18 @ 16:15)  T(F): 98.2 (11-14-18 @ 06:16), Max: 100.6 (11-13-18 @ 16:15)  HR: 94 (11-14-18 @ 06:16) (77 - 102)  BP: 139/82 (11-14-18 @ 06:16) (102/63 - 142/73)  RR: 20 (11-14-18 @ 06:16) (20 - 20)  SpO2: 95% (11-14-18 @ 06:16) (95% - 98%)      13 Nov 2018 07:01  -  14 Nov 2018 07:00  --------------------------------------------------------  IN:    IV PiggyBack: 500 mL    lactated ringers.: 675 mL    Solution: 100 mL  Total IN: 1275 mL    OUT:    Voided: 150 mL  Total OUT: 150 mL    Total NET: 1125 mL      ** PHYSICAL EXAM **    NAD, awake and alert  Respirations nonlabored  Abdomen soft, nontender, nondistended  No guarding or rebound tenderness   Ext: Warm, well perfused, multiple wounds with fibrinous exudate on bilateral thighs, large necrotic foul smelling wound on right posterior thigh with dark brown drainage and surrounding erythema, no crepitus    ** LABS **    BMP (11-13 @ 09:50)             138     |  101     |  15    		Ca++ --      Ca 8.0<L>             ---------------------------------( 63<L> 		Mg 1.6                4.3     |  24      |  0.61  			Ph 3.9         Coags (11-13 @ 10:00)  aPTT 28.2 / INR 2.17<H> / PT 25.3<H>        -> .Tissue Other, Skin Culture (11-13 @ 17:45)       No polymorphonuclear cells seen per low power field  No organisms seen per oil power field    NG    Testing in progress    -> .Tissue Other, Clotted blood from deep ulcer Culture (11-13 @ 17:37)       Few polymorphonuclear leukocytes seen per low power field  Moderate Gram positive cocci in pairs seen per oil power field    NG    Testing in progress    -> .Urine Catheterized Culture (11-12 @ 18:20)     NG    NG    Culture grew 3 or more types of organisms which indicate  collection contamination; consider recollection only if clinically  indicated.    -> .Blood Blood Culture (11-12 @ 09:31)       Growth in aerobic bottle: Gram Positive Cocci in Clusters    Blood Culture PCR    Growth in aerobic bottle: Gram Positive Cocci in Clusters  "Due to technical problems, Proteus sp. will Not be reported as part of  the BCID panel until further notice"  ***Blood Panel PCR results on this specimen are available  approximately 3 hours after the Gram stain result.***  Gram stain, PCR, and/or culture results may not always  correspond due to difference in methodologies.  ************************************************************  This PCR assay was performed using [employer].  The following targets are tested for: Enterococcus,  vancomycin resistant enterococci, Listeria monocytogenes,  coagulase negative staphylococci, S. aureus,  methicillin resistant S. aureus, Streptococcus agalactiae  (Group B), S. pneumoniae, S.pyogenes (Group A),  Acinetobacter baumannii, Enterobacter cloacae, E. coli,  Klebsiella oxytoca, K. pneumoniae, Proteus sp.,  Serratia marcescens, Haemophilus influenzae,  Neisseria meningitidis, Pseudomonas aeruginosa, Candida  albicans, C. glabrata, C krusei, C parapsilosis,  C. tropicalis and the KPC resistance gene.

## 2018-12-11 ENCOUNTER — APPOINTMENT (OUTPATIENT)
Dept: WOUND CARE | Facility: CLINIC | Age: 78
End: 2018-12-11
Payer: MEDICARE

## 2018-12-11 DIAGNOSIS — Z74.01 BED CONFINEMENT STATUS: ICD-10-CM

## 2018-12-11 DIAGNOSIS — E66.01 MORBID (SEVERE) OBESITY DUE TO EXCESS CALORIES: ICD-10-CM

## 2018-12-11 DIAGNOSIS — Z96.651 PRESENCE OF RIGHT ARTIFICIAL KNEE JOINT: ICD-10-CM

## 2018-12-11 PROCEDURE — 99213 OFFICE O/P EST LOW 20 MIN: CPT

## 2018-12-12 LAB
CULTURE RESULTS: SIGNIFICANT CHANGE UP
CULTURE RESULTS: SIGNIFICANT CHANGE UP
SPECIMEN SOURCE: SIGNIFICANT CHANGE UP
SPECIMEN SOURCE: SIGNIFICANT CHANGE UP

## 2019-01-15 ENCOUNTER — APPOINTMENT (OUTPATIENT)
Dept: WOUND CARE | Facility: CLINIC | Age: 79
End: 2019-01-15

## 2019-01-17 ENCOUNTER — APPOINTMENT (OUTPATIENT)
Dept: WOUND CARE | Facility: CLINIC | Age: 79
End: 2019-01-17
Payer: MEDICARE

## 2019-01-17 VITALS — TEMPERATURE: 98 F

## 2019-01-17 DIAGNOSIS — L88 PYODERMA GANGRENOSUM: ICD-10-CM

## 2019-01-17 DIAGNOSIS — S81.801D UNSPECIFIED OPEN WOUND, RIGHT LOWER LEG, SUBSEQUENT ENCOUNTER: ICD-10-CM

## 2019-01-17 PROCEDURE — 99213 OFFICE O/P EST LOW 20 MIN: CPT

## 2019-01-17 NOTE — DISCUSSION/SUMMARY
[FreeTextEntry1] : Ms Rdz was recently evaluated at [facility], and had requested discharge on Thanksgiving day.\par She was subsequently admitted to [facility], and is under the care of Dr Roderick Salas,  Dermatologist, with whom I spoke this evening , at the request of Mr Rdz, who had phoned me earlier in the day.\par I reviewed Mrs Rdz's last punch biopsy report with Dr Salas.\par He agrees with Diagnosis of Pyoderma Gangrenosa, and  plans to start Mrs Rdz on Cyclosporine\par We exchanged cell phone numbers\par I then informed Mr Rdz who remains very appreciative\par On discharge, I advised that patient be followed by this office\par Mr Rdz and Dr Salas  agree.

## 2019-01-17 NOTE — PHYSICAL EXAM
[JVD] : no jugular venous distention  [Ankle Swelling (On Exam)] : present [Ankle Swelling Bilaterally] : bilaterally  [Ankle Swelling On The Right] : mild [Alert] : alert [Anxious] : anxious [de-identified] : Well groomed, Mandel facies, Prudencio need for Tx [de-identified] : non labored [de-identified] : obese [de-identified] : non ambulatory, Prudencio needed to transfer chair to bed [FreeTextEntry1] : buttock/lateral thigh scattered [FreeTextEntry2] : 30 cm [FreeTextEntry3] : 6 cm [FreeTextEntry4] : 4cm  [de-identified] : PG [de-identified] : VAC [de-identified] : Wounds are scattered along lateral right thigh, and have been debrided by Plastic Surgery at [facility]\par \par Patient tolerates dressing change poorly\par 1/17/19- Patient only permits a limited exam of right lateral thigh\par No odor, no cellulitis, no purulence visiible\par VAC ordered [FreeTextEntry7] : lateral thigh [FreeTextEntry8] : 4 cm  [FreeTextEntry9] : 3.5 cm  [de-identified] : 1.5 cm  [de-identified] : PG [de-identified] : dakins  [de-identified] : scattered medial thigh  [de-identified] : PG [de-identified] : wound are small and currently dressed with Honey

## 2019-01-17 NOTE — REASON FOR VISIT
[Follow-Up: _____] : a [unfilled] follow-up visit [Spouse] : spouse [Formal Caregiver] : formal caregiver [FreeTextEntry1] : multiple wounds 2/2 PG

## 2019-01-17 NOTE — ASSESSMENT
[FreeTextEntry1] : I have spoken with both Mr and Mrs Rdz and have informed them of current wound status\par Patient was discharged from [facility] on Doxycycline, which she continues to take\par \par RTO 2 wks\par At end of visit placed back in WC using Hoya\par \par 1/17/19- Status d/w patient and \par Paid for medical transport to office\par At 's , a pharmacist, request \par Rx for #14 100mg of Doxycycline\par Patient denies any diarrhea or abdominal pain\par RTO 2 wks

## 2019-01-17 NOTE — HISTORY OF PRESENT ILLNESS
[FreeTextEntry1] : 79 yo female with 1 yr ago underwent explant of  Right TKR, and a subsequent protracted course\par Was recently at both [facility]  with new right thigh wounds thought 2/2 PG, and was treated with high dose steroids\par  subsequently sought opinion of [facility], and a Dr Roderick Salas, started Rx with Cyclosporin , with a steroid taper\par Patient has been home now for approx 2 wks with VN\par Arrives via medical transport \par 1/17/19- Last discharged from [place]- no records\par VN using Dakin on right thigh wounds

## 2019-02-12 ENCOUNTER — INPATIENT (INPATIENT)
Facility: HOSPITAL | Age: 79
LOS: 40 days | DRG: 853 | End: 2019-03-25
Attending: INTERNAL MEDICINE | Admitting: INTERNAL MEDICINE
Payer: MEDICARE

## 2019-02-12 VITALS
OXYGEN SATURATION: 95 % | SYSTOLIC BLOOD PRESSURE: 136 MMHG | HEART RATE: 93 BPM | DIASTOLIC BLOOD PRESSURE: 79 MMHG | RESPIRATION RATE: 17 BRPM | TEMPERATURE: 100 F

## 2019-02-12 DIAGNOSIS — Z95.2 PRESENCE OF PROSTHETIC HEART VALVE: Chronic | ICD-10-CM

## 2019-02-12 DIAGNOSIS — Z96.659 PRESENCE OF UNSPECIFIED ARTIFICIAL KNEE JOINT: Chronic | ICD-10-CM

## 2019-02-12 DIAGNOSIS — L03.115 CELLULITIS OF RIGHT LOWER LIMB: ICD-10-CM

## 2019-02-12 DIAGNOSIS — Z95.0 PRESENCE OF CARDIAC PACEMAKER: Chronic | ICD-10-CM

## 2019-02-12 DIAGNOSIS — Z98.890 OTHER SPECIFIED POSTPROCEDURAL STATES: Chronic | ICD-10-CM

## 2019-02-12 LAB
ALBUMIN SERPL ELPH-MCNC: 3.1 G/DL — LOW (ref 3.3–5)
ALP SERPL-CCNC: 107 U/L — SIGNIFICANT CHANGE UP (ref 40–120)
ALT FLD-CCNC: 9 U/L — LOW (ref 10–45)
ANION GAP SERPL CALC-SCNC: 16 MMOL/L — SIGNIFICANT CHANGE UP (ref 5–17)
APPEARANCE UR: CLEAR — SIGNIFICANT CHANGE UP
APTT BLD: 39.3 SEC — HIGH (ref 27.5–36.3)
AST SERPL-CCNC: 14 U/L — SIGNIFICANT CHANGE UP (ref 10–40)
BACTERIA # UR AUTO: NEGATIVE — SIGNIFICANT CHANGE UP
BASOPHILS # BLD AUTO: 0.1 K/UL — SIGNIFICANT CHANGE UP (ref 0–0.2)
BASOPHILS NFR BLD AUTO: 0.4 % — SIGNIFICANT CHANGE UP (ref 0–2)
BILIRUB SERPL-MCNC: 0.5 MG/DL — SIGNIFICANT CHANGE UP (ref 0.2–1.2)
BILIRUB UR-MCNC: NEGATIVE — SIGNIFICANT CHANGE UP
BUN SERPL-MCNC: 22 MG/DL — SIGNIFICANT CHANGE UP (ref 7–23)
CALCIUM SERPL-MCNC: 9.8 MG/DL — SIGNIFICANT CHANGE UP (ref 8.4–10.5)
CHLORIDE SERPL-SCNC: 99 MMOL/L — SIGNIFICANT CHANGE UP (ref 96–108)
CO2 SERPL-SCNC: 25 MMOL/L — SIGNIFICANT CHANGE UP (ref 22–31)
COLOR SPEC: YELLOW — SIGNIFICANT CHANGE UP
CREAT SERPL-MCNC: 1.05 MG/DL — SIGNIFICANT CHANGE UP (ref 0.5–1.3)
CRP SERPL-MCNC: 36.75 MG/DL — HIGH (ref 0–0.4)
DIFF PNL FLD: NEGATIVE — SIGNIFICANT CHANGE UP
EOSINOPHIL # BLD AUTO: 0.2 K/UL — SIGNIFICANT CHANGE UP (ref 0–0.5)
EOSINOPHIL NFR BLD AUTO: 1.1 % — SIGNIFICANT CHANGE UP (ref 0–6)
EPI CELLS # UR: 2 /HPF — SIGNIFICANT CHANGE UP
ERYTHROCYTE [SEDIMENTATION RATE] IN BLOOD: 106 MM/HR — HIGH (ref 0–20)
GAS PNL BLDV: SIGNIFICANT CHANGE UP
GLUCOSE SERPL-MCNC: 113 MG/DL — HIGH (ref 70–99)
GLUCOSE UR QL: NEGATIVE — SIGNIFICANT CHANGE UP
HCT VFR BLD CALC: 32.1 % — LOW (ref 34.5–45)
HGB BLD-MCNC: 10.3 G/DL — LOW (ref 11.5–15.5)
HYALINE CASTS # UR AUTO: 2 /LPF — SIGNIFICANT CHANGE UP (ref 0–2)
INR BLD: 1.13 RATIO — SIGNIFICANT CHANGE UP (ref 0.88–1.16)
KETONES UR-MCNC: SIGNIFICANT CHANGE UP
LEUKOCYTE ESTERASE UR-ACNC: NEGATIVE — SIGNIFICANT CHANGE UP
LYMPHOCYTES # BLD AUTO: 17.9 % — SIGNIFICANT CHANGE UP (ref 13–44)
LYMPHOCYTES # BLD AUTO: 3.1 K/UL — SIGNIFICANT CHANGE UP (ref 1–3.3)
MCHC RBC-ENTMCNC: 27.9 PG — SIGNIFICANT CHANGE UP (ref 27–34)
MCHC RBC-ENTMCNC: 32.2 GM/DL — SIGNIFICANT CHANGE UP (ref 32–36)
MCV RBC AUTO: 86.7 FL — SIGNIFICANT CHANGE UP (ref 80–100)
MONOCYTES # BLD AUTO: 0.8 K/UL — SIGNIFICANT CHANGE UP (ref 0–0.9)
MONOCYTES NFR BLD AUTO: 4.4 % — SIGNIFICANT CHANGE UP (ref 2–14)
NEUTROPHILS # BLD AUTO: 13.2 K/UL — HIGH (ref 1.8–7.4)
NEUTROPHILS NFR BLD AUTO: 76.3 % — SIGNIFICANT CHANGE UP (ref 43–77)
NITRITE UR-MCNC: NEGATIVE — SIGNIFICANT CHANGE UP
PH UR: 6 — SIGNIFICANT CHANGE UP (ref 5–8)
PLATELET # BLD AUTO: 288 K/UL — SIGNIFICANT CHANGE UP (ref 150–400)
POTASSIUM SERPL-MCNC: 4.7 MMOL/L — SIGNIFICANT CHANGE UP (ref 3.5–5.3)
POTASSIUM SERPL-SCNC: 4.7 MMOL/L — SIGNIFICANT CHANGE UP (ref 3.5–5.3)
PROT SERPL-MCNC: 6.6 G/DL — SIGNIFICANT CHANGE UP (ref 6–8.3)
PROT UR-MCNC: ABNORMAL
PROTHROM AB SERPL-ACNC: 13 SEC — HIGH (ref 10–12.9)
RBC # BLD: 3.7 M/UL — LOW (ref 3.8–5.2)
RBC # FLD: 16.3 % — HIGH (ref 10.3–14.5)
RBC CASTS # UR COMP ASSIST: 4 /HPF — SIGNIFICANT CHANGE UP (ref 0–4)
SODIUM SERPL-SCNC: 140 MMOL/L — SIGNIFICANT CHANGE UP (ref 135–145)
SP GR SPEC: 1.02 — SIGNIFICANT CHANGE UP (ref 1.01–1.02)
UROBILINOGEN FLD QL: NEGATIVE — SIGNIFICANT CHANGE UP
WBC # BLD: 17.3 K/UL — HIGH (ref 3.8–10.5)
WBC # FLD AUTO: 17.3 K/UL — HIGH (ref 3.8–10.5)
WBC UR QL: 3 /HPF — SIGNIFICANT CHANGE UP (ref 0–5)

## 2019-02-12 PROCEDURE — 99223 1ST HOSP IP/OBS HIGH 75: CPT

## 2019-02-12 PROCEDURE — 99285 EMERGENCY DEPT VISIT HI MDM: CPT | Mod: GC

## 2019-02-12 PROCEDURE — 71045 X-RAY EXAM CHEST 1 VIEW: CPT | Mod: 26

## 2019-02-12 PROCEDURE — 99221 1ST HOSP IP/OBS SF/LOW 40: CPT | Mod: GC

## 2019-02-12 PROCEDURE — 73564 X-RAY EXAM KNEE 4 OR MORE: CPT | Mod: 26,RT

## 2019-02-12 RX ORDER — OXYCODONE HYDROCHLORIDE 5 MG/1
0 TABLET ORAL
Qty: 120 | Refills: 0 | COMMUNITY

## 2019-02-12 RX ORDER — VANCOMYCIN HCL 1 G
1000 VIAL (EA) INTRAVENOUS ONCE
Qty: 0 | Refills: 0 | Status: COMPLETED | OUTPATIENT
Start: 2019-02-12 | End: 2019-02-12

## 2019-02-12 RX ORDER — SODIUM CHLORIDE 9 MG/ML
500 INJECTION INTRAMUSCULAR; INTRAVENOUS; SUBCUTANEOUS ONCE
Qty: 0 | Refills: 0 | Status: COMPLETED | OUTPATIENT
Start: 2019-02-12 | End: 2019-02-12

## 2019-02-12 RX ORDER — MORPHINE SULFATE 50 MG/1
2 CAPSULE, EXTENDED RELEASE ORAL ONCE
Qty: 0 | Refills: 0 | Status: DISCONTINUED | OUTPATIENT
Start: 2019-02-12 | End: 2019-02-12

## 2019-02-12 RX ORDER — ACETAMINOPHEN 500 MG
1000 TABLET ORAL ONCE
Qty: 0 | Refills: 0 | Status: COMPLETED | OUTPATIENT
Start: 2019-02-12 | End: 2019-02-12

## 2019-02-12 RX ORDER — CYCLOSPORINE 100 MG/1
0 CAPSULE ORAL
Qty: 120 | Refills: 0 | COMMUNITY

## 2019-02-12 RX ORDER — AMLODIPINE BESYLATE 2.5 MG/1
0 TABLET ORAL
Qty: 30 | Refills: 0 | COMMUNITY

## 2019-02-12 RX ORDER — METOPROLOL TARTRATE 50 MG
0 TABLET ORAL
Qty: 30 | Refills: 0 | COMMUNITY

## 2019-02-12 RX ORDER — ACETAMINOPHEN 500 MG
650 TABLET ORAL ONCE
Qty: 0 | Refills: 0 | Status: COMPLETED | OUTPATIENT
Start: 2019-02-12 | End: 2019-02-12

## 2019-02-12 RX ADMIN — Medication 1000 MILLIGRAM(S): at 15:14

## 2019-02-12 RX ADMIN — MORPHINE SULFATE 2 MILLIGRAM(S): 50 CAPSULE, EXTENDED RELEASE ORAL at 13:33

## 2019-02-12 RX ADMIN — Medication 250 MILLIGRAM(S): at 17:03

## 2019-02-12 RX ADMIN — MORPHINE SULFATE 2 MILLIGRAM(S): 50 CAPSULE, EXTENDED RELEASE ORAL at 15:14

## 2019-02-12 RX ADMIN — MORPHINE SULFATE 2 MILLIGRAM(S): 50 CAPSULE, EXTENDED RELEASE ORAL at 17:02

## 2019-02-12 RX ADMIN — SODIUM CHLORIDE 500 MILLILITER(S): 9 INJECTION INTRAMUSCULAR; INTRAVENOUS; SUBCUTANEOUS at 13:35

## 2019-02-12 RX ADMIN — Medication 400 MILLIGRAM(S): at 14:54

## 2019-02-12 RX ADMIN — Medication 400 MILLIGRAM(S): at 20:31

## 2019-02-12 RX ADMIN — SODIUM CHLORIDE 500 MILLILITER(S): 9 INJECTION INTRAMUSCULAR; INTRAVENOUS; SUBCUTANEOUS at 14:54

## 2019-02-12 NOTE — ED PROVIDER NOTE - PHYSICAL EXAMINATION
Gen: non-toxic  Head: NCAT  HEENT: EOMI, PERRLA, oral mucosa moist, normal conjunctiva  Lung: CTAB, no respiratory distress, no wheezes/rhonchi/rales B/L, speaking in full sentences  CV: RRR, no murmurs, rubs or gallops  Abd: soft, NTND, no guarding, no CVA tenderness, no rebound tenderness  MSK: right lateral knee TTP with mild swelling and erythema of the knee. limited rand of motion to right knee  Neuro: No focal sensory or motor deficits  Skin: Warm, well perfused, no rash  Psych: normal affect.   ~Jose Juan Simmons MD (PGY1)

## 2019-02-12 NOTE — H&P ADULT - ASSESSMENT
78F PMH of CAD s/p stent to LAD, AS s/p TAVR, PPM, DVT (Jan '18) on coumadin, IVC filter, pyoderma gangrenosum, hx MRSA infections, and total knee replacement c/b joint infections s/p I&D explantation and spacer placement followed by spacer removal and static spacer placement with joint fusion due to infection of initial spacer with complex wound closure by plastic surgery, admission here in November 2018 for hip and thigh wounds, now presenting with confusion and chills at home associated with poor appetite and confusion x 1 week and not eating x 2 days. Noted with fever here, YONATAN on labwork. R knee evaluated by orthopedic surgery.

## 2019-02-12 NOTE — H&P ADULT - HISTORY OF PRESENT ILLNESS
78F PMH of CAD s/p stent to LAD, AS s/p TAVR, PPM, DVT (Jan '18) on coumadin, IVC filter, pyoderma gangrenosum, hx MRSA infections, and total knee replacement c/b joint infections s/p I&D explantation and spacer placement followed by spacer removal and static spacer placement with joint fusion due to infection of initial spacer with complex wound closure by plastic surgery, admission here in November 2018 for hip and thigh wounds, now presenting with confusion and chills at home associated with poor appetite and confusion x 1 week and not eating x 2 days. Patient confused per aide, not remembering things she normally would. Has refused to eat food for two days citing poor appetite and has had poor mood though no suicidality. Patient with chronic pains of wounds of her hip/thigh as well as chronic R knee pain due to surgeries as above. There is some erythema surrounding the right knee that aide believes is somewhat worse than baseline over last 2-3 days but no exudates. Other wounds have been healing well per aide and she has wound vac on R hip and dressings in place on left thigh/glute. Patient has however also complained of chills at home. Denies any chest pains at this time, has not complained of this at home per aide. 	 Denies SOB at this time.

## 2019-02-12 NOTE — ED ADULT NURSE REASSESSMENT NOTE - NS ED NURSE REASSESS COMMENT FT1
MD Fontanez made aware of 102 fever. Patient spit out oral acetaminophen. MD aware, does not want to medicate at this time.

## 2019-02-12 NOTE — ED PROVIDER NOTE - ATTENDING CONTRIBUTION TO CARE
Pt with increasing pain and reddness R knee now unable to move it without pain, assoc with overlying reddness.  +fever here in ED.  Assoc with malaise and fatigue.

## 2019-02-12 NOTE — ED PROVIDER NOTE - CLINICAL SUMMARY MEDICAL DECISION MAKING FREE TEXT BOX
Jose Juan Simmons MD PGY1: 77 yo F with PMH of HTN and pyoderma w/ gluteal wounds b/l p/w confusion, depressed mood for 1week and not eating for 2 days. Delirium 2nd to septic knee, vs depression vs new onset dementia. will get cbc, cmp, ua, urine culture, blood culture, vbg, esr crp, xray chest and right knee ortho consult

## 2019-02-12 NOTE — H&P ADULT - PROBLEM SELECTOR PLAN 2
likely 2/2 metabolic encephalopathy given fever and concern for cellulitis  Treat cellulitis, reorient patient.  Patient also on narcotics and per orthopedic surgery who is familiar with patient she is sensitive to excessive narcotics, will reduce oxycodone to q8h PRN only for now.

## 2019-02-12 NOTE — ED PROVIDER NOTE - OBJECTIVE STATEMENT
Jose Juan Simmons MD PGY1: 79 yo F with PMH of HTN and pyoderma w/ gluteal wounds b/l p/w confusion, depressed mood for 1week and not eating for 2 days. Pt's  and aid is at bedside. Pt aid states that pt has not been herself for 1 week. Aid states that pt has been in a depressed mood and has been confused and has not ate or drank anything for 2 days. Pt state that she does not want to live but denies SI or HI. Pt also states that she has SOB

## 2019-02-12 NOTE — H&P ADULT - PROBLEM SELECTOR PLAN 7
Pt with depression, unclear if she is still on antidepressants. Currently also confused likely due to metabolic encephalopathy so patient not cooperative/reliable with psych exam for depression. Appears to have been on multiple SSRIs in the past. Given poor appetite and depression can consider medications such as remeron?

## 2019-02-12 NOTE — H&P ADULT - PROBLEM SELECTOR PLAN 3
Need to clarify if patient is still on simvastatin as this can interact with cyclosporine.  May need change to other statin.

## 2019-02-12 NOTE — H&P ADULT - PROBLEM SELECTOR PLAN 4
Pt with severe AS, need to be cautious with fluids.  At this time appears very dehydrated due to lack of po intake and labwork supports this.  Will hydrate with further 1L @ 90ml/hr for now

## 2019-02-12 NOTE — CONSULT NOTE ADULT - SUBJECTIVE AND OBJECTIVE BOX
Orthopaedic Surgery Consult Note    Patient is a 78y old  Female with h/o R knee PJI s/p explant with placement of static spacer in 2018 who returns with two days of confusion, decreased oral intake and chills. Patient most recently admitted in 2018 for treatment of bilateral gluteal ulcers/pyoderma gangrenosum with PRS/gen surgery. Per [facility], she is managed with wound care by outside physician and last wound check yesterday showed well healing ulcerations. Her knee incision is healing well. No significant knee pain. +fever/chills/SOB. Denies CP, dysuria, abdominal pain, n/v/d. No other complaints.      PAST MEDICAL & SURGICAL HISTORY:  CAD (coronary artery disease): HERBERT to LAD 2016  Aortic stenosis, severe  Uncomplicated asthma, unspecified asthma severity  Polymyalgia  Lumbar disc disease with radiculopathy  Spider veins  Anxiety  Severe aortic stenosis by prior echocardiogram:  and  2016  Dysphagia  Macular degeneration  Hiatal hernia  GERD (gastroesophageal reflux disease)  Sciatica  Osteoarthritis  S/P TKR (total knee replacement): joint infection s/p explant and abx spacer on 17  S/P TAVR (transcatheter aortic valve replacement): 17  Artificial cardiac pacemaker: 17  H/O cardiac catheterization: 16 HERBERT placed on LAD  H/O endoscopy: with dilatation of esophageal stricture   S/P cataract surgery: x2; 3-4yr ago  S/P gastroplasty: 28 yrs ago  S/P cholecystectomy: more than 15 years ago  S/P total knee replacement: left, 15yr ago  S/P total knee replacement: right, 12yr ago  S/P hysterectomy: 24yr ago    [] No significant past history as reviewed with the patient and family    MEDICATIONS  (STANDING):  vancomycin  IVPB 1000 milliGRAM(s) IV Intermittent once    MEDICATIONS  (PRN):    Allergies    amoxicillin (Other)    Intolerances    IV Contrast (Flushing (Skin); Nausea)      Vital Signs Last 24 Hrs  T(C): 36.5 (2019 14:58), Max: 38.9 (2019 12:55)  T(F): 97.7 (2019 14:58), Max: 102 (2019 12:55)  HR: 82 (2019 14:58) (82 - 95)  BP: 116/55 (2019 14:58) (105/83 - 136/79)  BP(mean): --  RR: 16 (2019 14:58) (16 - 18)  SpO2: 100% (2019 14:58) (95% - 100%)      PHYSICAL EXAM:  NAD  RLE  bilateral thigh incisions dressed, +wound vac on right side holding suction, pictures of wound check yesterday showed healing ulcerations with granulation tissue formation  knee wound healed, medial sided erythema, no drainage or fluctuance  5/5 ta/ehl/gcs  silt l4-s1  2+ dp pulse                           10.3   17.3  )-----------( 288      ( 2019 11:49 )             32.1         140  |  99  |  22  ----------------------------<  113<H>  4.7   |  25  |  1.05    Ca    9.8      2019 11:49    TPro  6.6  /  Alb  3.1<L>  /  TBili  0.5  /  DBili  x   /  AST  14  /  ALT  9<L>  /  AlkPhos  107  02-12    PT/INR - ( 2019 11:49 )   PT: 13.0 sec;   INR: 1.13 ratio         PTT - ( 2019 11:49 )  PTT:39.3 sec  Urinalysis Basic - ( 2019 12:31 )    Color: Yellow / Appearance: Clear / S.020 / pH: x  Gluc: x / Ketone: Trace  / Bili: Negative / Urobili: Negative   Blood: x / Protein: 30 mg/dL / Nitrite: Negative   Leuk Esterase: Negative / RBC: 4 /hpf / WBC 3 /HPF   Sq Epi: x / Non Sq Epi: 2 /hpf / Bacteria: Negative    IMAGING STUDIES:  < from: Xray Knee 4 Views, Right (19 @ 12:41) >    Patient status post arthrodesis with short intramedullary ruthann and cement.   The hardware is intact. Cement is intact without evidence of fracture. As   on prior examination, there is lucency along the medial aspect of the   femoral component cement, which appears to have increased in prominence   when compared to prior study. No fracture or dislocation. No knee joint   effusion. Extensive vascular calcifications.        IMPRESSION:  Increasing lucency adjacent to the medial aspect of the femoral component   cement, suggestive of possible loosening.    < end of copied text >    78y Female 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 requiring I&D, TKA explant, static spacer placement with complex wound closure by plastic surgery complicated by wound healing problems and development of pyoderma granulosum now returned with leukocytosis and fevers    - thigh/abd/knee wounds are managed by PRS/wound care  - abx per ID, previously recommended for lifelong minocycline  - please consult physical therapy, should spend majority of day oob to wheelchair, okay to ambulate but must remain 50% weightbearing on RLE with NO KNEE MOTION allowed, knee immobilizer at all times if OOB  - dvt ppx per primary  - rec nutrition consult, optimize nutrition for continued wound healing  - minimize narcotics for this patient, she tends to become oversedated  - no acute orthopedic intervention  - d/w attending

## 2019-02-12 NOTE — ED PROVIDER NOTE - PROGRESS NOTE DETAILS
Dr. Fontanez Note: pt concern for cellulitis vs septic joint, prior h/o MRSA, will await ortho recommendations first before starting antibiotics to avoid possible interference with arthrocentesis.  Will give fluids, tylenol, morphine, and will need admission. Jose Juan Simmons MD PGY1: Ortho was consulted and saw the pt. Pt believes that the pt has cellulitis and does not have a septic knee due to no joint space in the right bc pt has a fusion of the right knee. Will start on Abx and admit pt.

## 2019-02-12 NOTE — H&P ADULT - PROBLEM SELECTOR PLAN 1
s/p vancomycin, given YONATAN will recheck level in AM and will need to be dosed by level unless creatinine improved.  Will add ceftriaxone at this time to cover for other spp, has tolerated cephalosporins in past.  Will temporarily hold cyclosporine overnight given concern for recurrent infection, need to clarify with her prescribing doctors regarding this.  Patient also on chronic steroids for pyoderma though per aide is on very low dose, need to clarify dose (not on pharmacy records electronically obtained), doubt need for stress dose steroids at this time but likely should stay on low dose steroids once dose verified.   coming in AM and will have med list.  Per orthopedic surgery can be OOB primarily to wheelchair and can 50% weight bear on R leg if straight in immobilizer. s/p vancomycin, given YONATAN will recheck level in AM and will need to be dosed by level unless creatinine improved.  Will add ceftriaxone at this time to cover for other spp, has tolerated cephalosporins in past.  ID consult in AM w/[facility]  Will temporarily hold cyclosporine overnight given concern for recurrent infection, need to clarify with her prescribing doctors regarding this.  Patient also on chronic steroids for pyoderma though per aide is on very low dose, need to clarify dose (not on pharmacy records electronically obtained), doubt need for stress dose steroids at this time but likely should stay on low dose steroids once dose verified.   coming in AM and will have med list.  Per orthopedic surgery can be OOB primarily to wheelchair and can 50% weight bear on R leg if straight in immobilizer.

## 2019-02-12 NOTE — ED ADULT NURSE NOTE - NSIMPLEMENTINTERV_GEN_ALL_ED
Implemented All Fall Risk Interventions:  [place] to call system. Call bell, personal items and telephone within reach. Instruct patient to call for assistance. Room bathroom lighting operational. Non-slip footwear when patient is off stretcher. Physically safe environment: no spills, clutter or unnecessary equipment. Stretcher in lowest position, wheels locked, appropriate side rails in place. Provide visual cue, wrist band, yellow gown, etc. Monitor gait and stability. Monitor for mental status changes and reorient to person, place, and time. Review medications for side effects contributing to fall risk. Reinforce activity limits and safety measures with patient and family.

## 2019-02-12 NOTE — H&P ADULT - PROBLEM SELECTOR PLAN 6
coming in AM and will have medication list.   Need to confirm medications against this list when  gets here.  Unclear if patient is still on xarelto at this time and unclear what dose of prednisone.

## 2019-02-12 NOTE — ED ADULT NURSE NOTE - OBJECTIVE STATEMENT
Patient is an 78 y female presenting to the ED via EMS with c/o SOB. Pt is A&Ox2. Patient's family reports that she has not eaten in two days. Pt states "she is not able to breathe." Pt refused placement of nasal cannula for supplemental O2. Pt has pmh of HTN, depression, dementia. Patient is an 78 y female presenting to the ED via EMS with c/o SOB. Pt is A&Ox2. Patient's family reports that she has not eaten in two days. Pt states "she is not able to breathe." Pt refused placement of nasal cannula for supplemental O2. Pt has pmh of HTN and dementia. Patient's  reports she "has not been herself" for about a week. Patient states she "does not want to live anymore," but denies SI or HI. Pt has 20 gauge peripheral IV in left wrist. Patient is an 78 y female presenting to the ED via EMS with c/o SOB. Pt is A&Ox2. Patient's family reports that she has not eaten in two days. Pt states "she is not able to breathe." Pt refused placement of nasal cannula for supplemental O2. Pt is bedbound. Pt has two pressure wounds on right gluteus. One wound on right gluteus is approximately 6 inches long and is unstageable with wound vac in place. Pt has two pressure injuries on left gluteus. Pt displays skin redness under both breasts and on abdomen. Patient's right knee is swollen, painful red and warm to touch. Pt has pmh of HTN and dementia. Patient's  reports she "has not been herself" for about a week. Patient states she "does not want to live anymore," but denies SI or HI. Pt has 20 gauge peripheral IV in left wrist. Patient is an 78 y female presenting to the ED via EMS with c/o SOB. Pt is A&Ox2. Patient's family reports that she has not eaten in two days. Pt states "she is not able to breathe." Pt refused placement of nasal cannula for supplemental O2. Pt is bedbound. Pt has two pressure wounds on left gluteus tunneling noted per family, pt is in healing process. Wound on right gluteus is approximately 6 inches long and is unstageable with wound vac in place. Skin redness under both breasts and on abdomen noted. Patient's right knee is swollen, painful red and warm to touch. Pt has pmh of HTN and dementia. Patient's  reports she "has not been herself" for about a week. Patient states she "does not want to live anymore," but denies SI or HI. Pt has 20 gauge peripheral IV in left wrist.

## 2019-02-12 NOTE — H&P ADULT - NSHPLABSRESULTS_GEN_ALL_CORE
Labs personally reviewed:                        10.3   17.3  )-----------( 288      ( 2019 11:49 )             32.1       02-12    140  |  99  |  22  ----------------------------<  113<H>  4.7   |  25  |  1.05    Ca    9.8      2019 11:49    TPro  6.6  /  Alb  3.1<L>  /  TBili  0.5  /  DBili  x   /  AST  14  /  ALT  9<L>  /  AlkPhos  107              LIVER FUNCTIONS - ( 2019 11:49 )  Alb: 3.1 g/dL / Pro: 6.6 g/dL / ALK PHOS: 107 U/L / ALT: 9 U/L / AST: 14 U/L / GGT: x             PT/INR - ( 2019 11:49 )   PT: 13.0 sec;   INR: 1.13 ratio         PTT - ( 2019 11:49 )  PTT:39.3 sec    Urinalysis Basic - ( 2019 12:31 )    Color: Yellow / Appearance: Clear / S.020 / pH: x  Gluc: x / Ketone: Trace  / Bili: Negative / Urobili: Negative   Blood: x / Protein: 30 mg/dL / Nitrite: Negative   Leuk Esterase: Negative / RBC: 4 /hpf / WBC 3 /HPF   Sq Epi: x / Non Sq Epi: 2 /hpf / Bacteria: Negative      CXR personally reviewed-no acute findings on cxr  knee x-ray with suggestion of screw loosening somewhat worse than on prior x-ray     EKG ordered

## 2019-02-12 NOTE — H&P ADULT - NSHPPHYSICALEXAM_GEN_ALL_CORE
Vital Signs Last 24 Hrs  T(C): 36.9 (12 Feb 2019 23:54), Max: 38.9 (12 Feb 2019 12:55)  T(F): 98.5 (12 Feb 2019 23:54), Max: 102 (12 Feb 2019 12:55)  HR: 93 (12 Feb 2019 23:54) (82 - 98)  BP: 108/65 (12 Feb 2019 23:54) (105/83 - 148/82)  BP(mean): --  RR: 20 (12 Feb 2019 23:54) (16 - 20)  SpO2: 95% (12 Feb 2019 23:54) (95% - 100%)    GENERAL: No acute distress, well-developed  HEAD:  Atraumatic, Normocephalic  EYES: EOMI, PERRLA, conjunctiva and sclera clear  ENT: Oral mucosa moist  NECK: Neck supple  CHEST/LUNG: Clear to auscultation bilaterally; No wheeze, no rales, no rhonchi.    HEART: Regular rate and rhythm; No murmurs, rubs, or gallops  ABDOMEN: Soft, Nontender, Nondistended; Bowel sounds present  EXTREMITIES:  No clubbing, cyanosis. 3+ non pitting edema b/l. Slight erythema overlying right knee area in area of prior wound, somewhat increased calor at site.  Wound closed.   VASCULAR: Posterior tibialis pulses intact bilaterally  PSYCH: Normal behavior, normal affect  NEUROLOGY: AAOx1  SKIN: Wound dressings in place on ulcers, see also extremities section

## 2019-02-13 DIAGNOSIS — I25.10 ATHEROSCLEROTIC HEART DISEASE OF NATIVE CORONARY ARTERY WITHOUT ANGINA PECTORIS: ICD-10-CM

## 2019-02-13 DIAGNOSIS — Z79.899 OTHER LONG TERM (CURRENT) DRUG THERAPY: ICD-10-CM

## 2019-02-13 DIAGNOSIS — F32.9 MAJOR DEPRESSIVE DISORDER, SINGLE EPISODE, UNSPECIFIED: ICD-10-CM

## 2019-02-13 DIAGNOSIS — I35.0 NONRHEUMATIC AORTIC (VALVE) STENOSIS: ICD-10-CM

## 2019-02-13 DIAGNOSIS — R41.82 ALTERED MENTAL STATUS, UNSPECIFIED: ICD-10-CM

## 2019-02-13 DIAGNOSIS — K21.9 GASTRO-ESOPHAGEAL REFLUX DISEASE WITHOUT ESOPHAGITIS: ICD-10-CM

## 2019-02-13 DIAGNOSIS — L03.115 CELLULITIS OF RIGHT LOWER LIMB: ICD-10-CM

## 2019-02-13 LAB
-  COAGULASE NEGATIVE STAPHYLOCOCCUS: SIGNIFICANT CHANGE UP
ANION GAP SERPL CALC-SCNC: 15 MMOL/L — SIGNIFICANT CHANGE UP (ref 5–17)
BASOPHILS # BLD AUTO: 0.03 K/UL — SIGNIFICANT CHANGE UP (ref 0–0.2)
BASOPHILS NFR BLD AUTO: 0.2 % — SIGNIFICANT CHANGE UP (ref 0–2)
BUN SERPL-MCNC: 25 MG/DL — HIGH (ref 7–23)
CALCIUM SERPL-MCNC: 9.4 MG/DL — SIGNIFICANT CHANGE UP (ref 8.4–10.5)
CHLORIDE SERPL-SCNC: 105 MMOL/L — SIGNIFICANT CHANGE UP (ref 96–108)
CO2 SERPL-SCNC: 21 MMOL/L — LOW (ref 22–31)
CREAT SERPL-MCNC: 1.49 MG/DL — HIGH (ref 0.5–1.3)
CULTURE RESULTS: NO GROWTH — SIGNIFICANT CHANGE UP
EOSINOPHIL # BLD AUTO: 0.15 K/UL — SIGNIFICANT CHANGE UP (ref 0–0.5)
EOSINOPHIL NFR BLD AUTO: 1.1 % — SIGNIFICANT CHANGE UP (ref 0–6)
GLUCOSE SERPL-MCNC: 125 MG/DL — HIGH (ref 70–99)
GRAM STN FLD: SIGNIFICANT CHANGE UP
HCT VFR BLD CALC: 31.4 % — LOW (ref 34.5–45)
HGB BLD-MCNC: 9 G/DL — LOW (ref 11.5–15.5)
IMM GRANULOCYTES NFR BLD AUTO: 0.5 % — SIGNIFICANT CHANGE UP (ref 0–1.5)
LYMPHOCYTES # BLD AUTO: 18.4 % — SIGNIFICANT CHANGE UP (ref 13–44)
LYMPHOCYTES # BLD AUTO: 2.52 K/UL — SIGNIFICANT CHANGE UP (ref 1–3.3)
MAGNESIUM SERPL-MCNC: 1.7 MG/DL — SIGNIFICANT CHANGE UP (ref 1.6–2.6)
MCHC RBC-ENTMCNC: 26.2 PG — LOW (ref 27–34)
MCHC RBC-ENTMCNC: 28.7 GM/DL — LOW (ref 32–36)
MCV RBC AUTO: 91.5 FL — SIGNIFICANT CHANGE UP (ref 80–100)
METHOD TYPE: SIGNIFICANT CHANGE UP
MONOCYTES # BLD AUTO: 0.56 K/UL — SIGNIFICANT CHANGE UP (ref 0–0.9)
MONOCYTES NFR BLD AUTO: 4.1 % — SIGNIFICANT CHANGE UP (ref 2–14)
NEUTROPHILS # BLD AUTO: 10.36 K/UL — HIGH (ref 1.8–7.4)
NEUTROPHILS NFR BLD AUTO: 75.7 % — SIGNIFICANT CHANGE UP (ref 43–77)
PHOSPHATE SERPL-MCNC: 5.4 MG/DL — HIGH (ref 2.5–4.5)
PLATELET # BLD AUTO: 275 K/UL — SIGNIFICANT CHANGE UP (ref 150–400)
POTASSIUM SERPL-MCNC: 4.9 MMOL/L — SIGNIFICANT CHANGE UP (ref 3.5–5.3)
POTASSIUM SERPL-SCNC: 4.9 MMOL/L — SIGNIFICANT CHANGE UP (ref 3.5–5.3)
RBC # BLD: 3.43 M/UL — LOW (ref 3.8–5.2)
RBC # FLD: 17.7 % — HIGH (ref 10.3–14.5)
SODIUM SERPL-SCNC: 141 MMOL/L — SIGNIFICANT CHANGE UP (ref 135–145)
SPECIMEN SOURCE: SIGNIFICANT CHANGE UP
VANCOMYCIN TROUGH SERPL-MCNC: 9.2 UG/ML — LOW (ref 10–20)
WBC # BLD: 13.69 K/UL — HIGH (ref 3.8–10.5)
WBC # FLD AUTO: 13.69 K/UL — HIGH (ref 3.8–10.5)

## 2019-02-13 PROCEDURE — 99232 SBSQ HOSP IP/OBS MODERATE 35: CPT

## 2019-02-13 RX ORDER — HEPARIN SODIUM 5000 [USP'U]/ML
5000 INJECTION INTRAVENOUS; SUBCUTANEOUS EVERY 8 HOURS
Qty: 0 | Refills: 0 | Status: DISCONTINUED | OUTPATIENT
Start: 2019-02-13 | End: 2019-03-25

## 2019-02-13 RX ORDER — GABAPENTIN 400 MG/1
300 CAPSULE ORAL
Qty: 0 | Refills: 0 | Status: DISCONTINUED | OUTPATIENT
Start: 2019-02-13 | End: 2019-03-03

## 2019-02-13 RX ORDER — ACETAMINOPHEN 500 MG
650 TABLET ORAL EVERY 6 HOURS
Qty: 0 | Refills: 0 | Status: DISCONTINUED | OUTPATIENT
Start: 2019-02-13 | End: 2019-03-25

## 2019-02-13 RX ORDER — OXYCODONE AND ACETAMINOPHEN 5; 325 MG/1; MG/1
1 TABLET ORAL EVERY 8 HOURS
Qty: 0 | Refills: 0 | Status: DISCONTINUED | OUTPATIENT
Start: 2019-02-13 | End: 2019-02-13

## 2019-02-13 RX ORDER — OXYCODONE AND ACETAMINOPHEN 5; 325 MG/1; MG/1
1 TABLET ORAL EVERY 6 HOURS
Qty: 0 | Refills: 0 | Status: DISCONTINUED | OUTPATIENT
Start: 2019-02-13 | End: 2019-02-20

## 2019-02-13 RX ORDER — OXYCODONE HYDROCHLORIDE 5 MG/1
1 TABLET ORAL
Qty: 0 | Refills: 0 | COMMUNITY

## 2019-02-13 RX ORDER — AMLODIPINE BESYLATE 2.5 MG/1
10 TABLET ORAL DAILY
Qty: 0 | Refills: 0 | Status: DISCONTINUED | OUTPATIENT
Start: 2019-02-13 | End: 2019-03-03

## 2019-02-13 RX ORDER — VANCOMYCIN HCL 1 G
1000 VIAL (EA) INTRAVENOUS EVERY 24 HOURS
Qty: 0 | Refills: 0 | Status: DISCONTINUED | OUTPATIENT
Start: 2019-02-14 | End: 2019-02-20

## 2019-02-13 RX ORDER — MEROPENEM 1 G/30ML
INJECTION INTRAVENOUS
Qty: 0 | Refills: 0 | Status: DISCONTINUED | OUTPATIENT
Start: 2019-02-13 | End: 2019-02-14

## 2019-02-13 RX ORDER — VANCOMYCIN HCL 1 G
VIAL (EA) INTRAVENOUS
Qty: 0 | Refills: 0 | Status: DISCONTINUED | OUTPATIENT
Start: 2019-02-13 | End: 2019-02-20

## 2019-02-13 RX ORDER — ASCORBIC ACID 60 MG
500 TABLET,CHEWABLE ORAL DAILY
Qty: 0 | Refills: 0 | Status: DISCONTINUED | OUTPATIENT
Start: 2019-02-13 | End: 2019-03-25

## 2019-02-13 RX ORDER — ASPIRIN/CALCIUM CARB/MAGNESIUM 324 MG
81 TABLET ORAL DAILY
Qty: 0 | Refills: 0 | Status: DISCONTINUED | OUTPATIENT
Start: 2019-02-13 | End: 2019-03-25

## 2019-02-13 RX ORDER — POLYETHYLENE GLYCOL 3350 17 G/17G
17 POWDER, FOR SOLUTION ORAL DAILY
Qty: 0 | Refills: 0 | Status: DISCONTINUED | OUTPATIENT
Start: 2019-02-13 | End: 2019-03-25

## 2019-02-13 RX ORDER — VANCOMYCIN HCL 1 G
1000 VIAL (EA) INTRAVENOUS ONCE
Qty: 0 | Refills: 0 | Status: COMPLETED | OUTPATIENT
Start: 2019-02-13 | End: 2019-02-13

## 2019-02-13 RX ORDER — CEFTRIAXONE 500 MG/1
INJECTION, POWDER, FOR SOLUTION INTRAMUSCULAR; INTRAVENOUS
Qty: 0 | Refills: 0 | Status: DISCONTINUED | OUTPATIENT
Start: 2019-02-13 | End: 2019-02-13

## 2019-02-13 RX ORDER — MEROPENEM 1 G/30ML
500 INJECTION INTRAVENOUS ONCE
Qty: 0 | Refills: 0 | Status: COMPLETED | OUTPATIENT
Start: 2019-02-13 | End: 2019-02-13

## 2019-02-13 RX ORDER — PANTOPRAZOLE SODIUM 20 MG/1
40 TABLET, DELAYED RELEASE ORAL
Qty: 0 | Refills: 0 | Status: DISCONTINUED | OUTPATIENT
Start: 2019-02-13 | End: 2019-02-26

## 2019-02-13 RX ORDER — METOPROLOL TARTRATE 50 MG
25 TABLET ORAL DAILY
Qty: 0 | Refills: 0 | Status: DISCONTINUED | OUTPATIENT
Start: 2019-02-13 | End: 2019-02-26

## 2019-02-13 RX ORDER — CHOLECALCIFEROL (VITAMIN D3) 125 MCG
1000 CAPSULE ORAL DAILY
Qty: 0 | Refills: 0 | Status: DISCONTINUED | OUTPATIENT
Start: 2019-02-13 | End: 2019-03-25

## 2019-02-13 RX ORDER — MEROPENEM 1 G/30ML
500 INJECTION INTRAVENOUS EVERY 8 HOURS
Qty: 0 | Refills: 0 | Status: DISCONTINUED | OUTPATIENT
Start: 2019-02-13 | End: 2019-02-14

## 2019-02-13 RX ORDER — LANOLIN ALCOHOL/MO/W.PET/CERES
1 CREAM (GRAM) TOPICAL AT BEDTIME
Qty: 0 | Refills: 0 | Status: DISCONTINUED | OUTPATIENT
Start: 2019-02-13 | End: 2019-02-14

## 2019-02-13 RX ORDER — AMLODIPINE BESYLATE 2.5 MG/1
1 TABLET ORAL
Qty: 0 | Refills: 0 | COMMUNITY

## 2019-02-13 RX ORDER — CEFTRIAXONE 500 MG/1
1 INJECTION, POWDER, FOR SOLUTION INTRAMUSCULAR; INTRAVENOUS ONCE
Qty: 0 | Refills: 0 | Status: COMPLETED | OUTPATIENT
Start: 2019-02-13 | End: 2019-02-13

## 2019-02-13 RX ORDER — SODIUM CHLORIDE 9 MG/ML
1000 INJECTION INTRAMUSCULAR; INTRAVENOUS; SUBCUTANEOUS
Qty: 0 | Refills: 0 | Status: DISCONTINUED | OUTPATIENT
Start: 2019-02-13 | End: 2019-02-16

## 2019-02-13 RX ADMIN — HEPARIN SODIUM 5000 UNIT(S): 5000 INJECTION INTRAVENOUS; SUBCUTANEOUS at 16:50

## 2019-02-13 RX ADMIN — OXYCODONE AND ACETAMINOPHEN 1 TABLET(S): 5; 325 TABLET ORAL at 01:25

## 2019-02-13 RX ADMIN — Medication 81 MILLIGRAM(S): at 11:56

## 2019-02-13 RX ADMIN — SODIUM CHLORIDE 90 MILLILITER(S): 9 INJECTION INTRAMUSCULAR; INTRAVENOUS; SUBCUTANEOUS at 01:25

## 2019-02-13 RX ADMIN — Medication 1 TABLET(S): at 15:39

## 2019-02-13 RX ADMIN — GABAPENTIN 300 MILLIGRAM(S): 400 CAPSULE ORAL at 06:32

## 2019-02-13 RX ADMIN — Medication 650 MILLIGRAM(S): at 15:39

## 2019-02-13 RX ADMIN — HEPARIN SODIUM 5000 UNIT(S): 5000 INJECTION INTRAVENOUS; SUBCUTANEOUS at 06:32

## 2019-02-13 RX ADMIN — Medication 25 MILLIGRAM(S): at 06:32

## 2019-02-13 RX ADMIN — SODIUM CHLORIDE 90 MILLILITER(S): 9 INJECTION INTRAMUSCULAR; INTRAVENOUS; SUBCUTANEOUS at 16:49

## 2019-02-13 RX ADMIN — CEFTRIAXONE 100 GRAM(S): 500 INJECTION, POWDER, FOR SOLUTION INTRAMUSCULAR; INTRAVENOUS at 01:25

## 2019-02-13 RX ADMIN — OXYCODONE AND ACETAMINOPHEN 1 TABLET(S): 5; 325 TABLET ORAL at 13:00

## 2019-02-13 RX ADMIN — Medication 500 MILLIGRAM(S): at 11:56

## 2019-02-13 RX ADMIN — MEROPENEM 100 MILLIGRAM(S): 1 INJECTION INTRAVENOUS at 22:07

## 2019-02-13 RX ADMIN — Medication 250 MILLIGRAM(S): at 11:53

## 2019-02-13 RX ADMIN — OXYCODONE AND ACETAMINOPHEN 1 TABLET(S): 5; 325 TABLET ORAL at 11:56

## 2019-02-13 RX ADMIN — GABAPENTIN 300 MILLIGRAM(S): 400 CAPSULE ORAL at 18:23

## 2019-02-13 RX ADMIN — AMLODIPINE BESYLATE 10 MILLIGRAM(S): 2.5 TABLET ORAL at 06:32

## 2019-02-13 RX ADMIN — MEROPENEM 100 MILLIGRAM(S): 1 INJECTION INTRAVENOUS at 11:50

## 2019-02-13 RX ADMIN — Medication 1 TABLET(S): at 06:32

## 2019-02-13 RX ADMIN — PANTOPRAZOLE SODIUM 40 MILLIGRAM(S): 20 TABLET, DELAYED RELEASE ORAL at 06:32

## 2019-02-13 RX ADMIN — OXYCODONE AND ACETAMINOPHEN 1 TABLET(S): 5; 325 TABLET ORAL at 02:25

## 2019-02-13 NOTE — CONSULT NOTE ADULT - ASSESSMENT
79 yo female with history of PMR, Pyoderma Gangrenosum, s/p TAVR and explant of infected RT Knee for MRSA ,now admitted with fever to 102 and leukocytosis.  She is on a steroid taper and cyclosporine for PG and is suppoed to be taking doxy for MRSA suppression.  Her picture is suggestive of infection with fever and leukocytosis although exam is somewhat non focal.  By report her wounds have been improving.She also has been treated for appendicitis in the past year and PICC line related coag negative staph bacteremia.  She may be colonized with resistant roopa.  Suggest:  1.await blood cultures results  2.consider CT of A/P as she may not localize pain and her appendix has been considered infecetd in past.  3Cyclosporine levels, need to clarify proper prednisone dose.  4.Wound care f/u  5.Will cover with vanco and meropenem pending blood cultures.

## 2019-02-13 NOTE — CHART NOTE - NSCHARTNOTEFT_GEN_A_CORE
Addendum: Medications reconciled.   D/w the son Chino: home: [phone], cell: [phone]  She no longer takes Xarelto. (completed course for treatment of DVT a few months ago)  - cyclosporine 200 mg BID (for wounds/pyoderma), with MgOxide supplement  - asa 81 mg  - off of Doxycyline 100 mg BID for past 10 days (was told by Bertha Barone RN to stop)  - on taper dose of prednisone, currently on 5 mg until this Saturday 2/16/19 last dose  - metoprolol 25 mg BID  - atovastatin 10 mg  - melatonin PRN, Miralax, colace 2 tabs at night, vit D 1000 units, vit B complex, Iron supplements  - pantoprozole Addendum: Medications reconciled.   D/w the son [place]: home: [phone], cell: [phone]  She no longer takes Xarelto. (completed course for treatment of DVT a few months ago)  - cyclosporine 200 mg BID (for wounds/pyoderma), with MgOxide supplement  - asa 81 mg  - off of Doxycyline 100 mg BID for past 10 days (was told by Bertha Barone RN to stop)  - on taper dose of prednisone, currently on 5 mg until this Saturday 2/16/19 last dose  - metoprolol 25 mg BID  - atovastatin 10 mg  - melatonin PRN, Miralax, colace 2 tabs at night, vit D 1000 units, vit B complex, Iron supplements  - pantoprozole  - oxycondone 5 mg q4 to q6hr PRN, gabapentin 300 mg BID    - Dr[facility] DEN[facility] ([facility])  - [phone] Addendum: Medications reconciled.   D/w the son [place]: home: [phone], cell: [phone]  She no longer takes Xarelto. (completed course for treatment of DVT a few months ago)  - cyclosporine 200 mg BID (for wounds/pyoderma), with MgOxide supplement  - asa 81 mg  - off of Doxycyline 100 mg BID for past 10 days (was told by Bertha Barone RN to stop)  - on taper dose of prednisone, currently on 5 mg until this Saturday 2/16/19 last dose  - metoprolol 25 mg BID  - atorvastatin 10 mg  - melatonin PRN, Miralax, dulcolax 2 tabs at night, vit D 1000 units, vit B complex, Iron supplements  - pantoprazole  - oxycodone 5 mg q4 to q6hr PRN, gabapentin 300 mg BID    - Dr. DENISSE [facility]umbo ([facility])  - [phone]

## 2019-02-13 NOTE — PROGRESS NOTE ADULT - SUBJECTIVE AND OBJECTIVE BOX
Less confused this AM  Not in apparent pain    NAD  RLE  bilateral thigh incisions dressed, +wound vac on right side holding suction  knee wound healed, no surrounding knee erythema and no drainage or fluctuance  5/5 ta/ehl/gcs  silt l4-s1  2+ dp pulse

## 2019-02-13 NOTE — CONSULT NOTE ADULT - SUBJECTIVE AND OBJECTIVE BOX
HPI:   Patient is a 78y female with a history of CAD.obesity,PMR,s.p TAVR for aortic stenosis, s/p B/L TKA with rt knee complicated by infections, s/p joint explant with spacer for MRSA infection, maintained on oral doxy for staph suppression,who was admitted last night with 48 hours of lethargy,confusion,and poor po intake.She is on treatment for pyoderma gangrenosum which developed post rt knee explant with cyclosporine ans steroid taper.She has B/L buttock/hip wounds, the right managed with wound vac and the left with dressings and local wound care.Her prednisone dose is being tapered,not clear what she is on.She has also been treated over past year with meropenem for CT evidence of appendicitis and a coag negative staph bacteremia felt to be PICC line related.She was hospitalized in November at [facility] for feverto 39 and leukocytosis, improved with meropenem x 1 week and levaquin x 1 week,no obvious primary focus,CT of A/P just revealed chronic appendiceal thickening.She denies any abdominal pain, dysuria or shortness of breath.She has received CTX and vanco in ER, concerns raised over RLE cellulitis.    REVIEW OF SYSTEMS:  All other review of systems negative (Comprehensive ROS): limited, non ambulatory, lethargic, doses off to sleep    PAST MEDICAL & SURGICAL HISTORY:  CAD (coronary artery disease): HERBERT to LAD 2016  Aortic stenosis, severe  Uncomplicated asthma, unspecified asthma severity  Polymyalgia  Lumbar disc disease with radiculopathy  Spider veins  Anxiety  Severe aortic stenosis by prior echocardiogram:  and  2016  Dysphagia  Macular degeneration  Hiatal hernia  GERD (gastroesophageal reflux disease)  Sciatica  Osteoarthritis  S/P TKR (total knee replacement): joint infection s/p explant and abx spacer on 17  S/P TAVR (transcatheter aortic valve replacement): 17  Artificial cardiac pacemaker: 17  H/O cardiac catheterization: 16 HERBERT placed on LAD  H/O endoscopy: with dilatation of esophageal stricture   S/P cataract surgery: x2; 3-4yr ago  S/P gastroplasty: 28 yrs ago  S/P cholecystectomy: more than 15 years ago  S/P total knee replacement: left, 15yr ago  S/P total knee replacement: right, 12yr ago  S/P hysterectomy: 24yr ago      Allergies    amoxicillin (Other)    Intolerances    IV Contrast (Flushing (Skin); Nausea)      Antimicrobials Day #  :s/p vanco  cefTRIAXone   IVPB        Other Medications:  acetaminophen   Tablet .. 650 milliGRAM(s) Oral every 6 hours PRN  amLODIPine   Tablet 10 milliGRAM(s) Oral daily  ascorbic acid 500 milliGRAM(s) Oral daily  aspirin enteric coated 81 milliGRAM(s) Oral daily  calcium carbonate 1250 mG  + Vitamin D (OsCal 500 + D) 1 Tablet(s) Oral three times a day  gabapentin 300 milliGRAM(s) Oral two times a day  heparin  Injectable 5000 Unit(s) SubCutaneous every 8 hours  melatonin 1 milliGRAM(s) Oral at bedtime  metoprolol succinate ER 25 milliGRAM(s) Oral daily  oxyCODONE    5 mG/acetaminophen 325 mG 1 Tablet(s) Oral every 8 hours PRN  pantoprazole    Tablet 40 milliGRAM(s) Oral before breakfast  sodium chloride 0.9%. 1000 milliLiter(s) IV Continuous <Continuous>      FAMILY HISTORY:  No pertinent family history in first degree relatives      SOCIAL HISTORY:  Smoking:  no   ETOH: no    Drug Use: no        T(F): 98.5 (19 @ 07:52), Max: 102 (19 @ 12:55)  HR: 78 (19 @ 07:52)  BP: 150/57 (19 @ 07:52)  RR: 128 (19 @ 07:52)  SpO2: 98% (19 @ 07:52)  Wt(kg): --    PHYSICAL EXAM:  General: alert, no acute distress, obese  Eyes:  anicteric, no conjunctival injection, no discharge  Oropharynx: no lesions or injection 	  Neck: supple, without adenopathy  Lungs: clear to auscultation  distant heart tones  Abdomen: soft, nondistended, nontender, without mass or organomegaly  Skin: RT buttocks with wound vac.Left buttocks dressed.  Extremities: no clubbing, cyanosis, or edema.RT knee wounds have healed, no active soft tissue infection.  Neurologic: alert, oriented, moves all extremities    LAB RESULTS:                        10.3   17.3  )-----------( 288      ( 2019 11:49 )             32.1     -    141  |  105  |  25<H>  ----------------------------<  125<H>  4.9   |  21<L>  |  1.49<H>    Ca    9.4      2019 07:50  Phos  5.4     -  Mg     1.7     -    TPro  6.6  /  Alb  3.1<L>  /  TBili  0.5  /  DBili  x   /  AST  14  /  ALT  9<L>  /  AlkPhos  107      LIVER FUNCTIONS - ( 2019 11:49 )  Alb: 3.1 g/dL / Pro: 6.6 g/dL / ALK PHOS: 107 U/L / ALT: 9 U/L / AST: 14 U/L / GGT: x           Urinalysis Basic - ( 2019 12:31 )    Color: Yellow / Appearance: Clear / S.020 / pH: x  Gluc: x / Ketone: Trace  / Bili: Negative / Urobili: Negative   Blood: x / Protein: 30 mg/dL / Nitrite: Negative   Leuk Esterase: Negative / RBC: 4 /hpf / WBC 3 /HPF   Sq Epi: x / Non Sq Epi: 2 /hpf / Bacteria: Negative        MICROBIOLOGY:  RECENT CULTURES:    pending    RADIOLOGY REVIEWED:  < from: Xray Chest 1 View- PORTABLE-Urgent (19 @ 12:33) >  INTERPRETATION:  A single chest x-ray was obtained on 2019.    Indication: Shortness of breath.    Impression:    The heart is enlarged. Elevation right hemidiaphragm. The lungs appear to   be clear. No radiographic evidence for congestive heart failure. A pacer   is in good position. No change in the appearance of the chest when   compared to previous study done 2018.    < end of copied text >

## 2019-02-13 NOTE — PROGRESS NOTE ADULT - ASSESSMENT
no evidence of failed R knee static spacer/fusion    Thigh/buttock wounds are managed by PRS/wound care  Recommend ID consult given complex hx. previously was on minocycline  Allowed RLE 50% weightbearing on RLE with NO KNEE MOTION allowed, knee immobilizer at all times if OOB  No plan for orthopedic intervention for the R knee, not a good candidate for conversion to TKA or removal of spacer given difficult of wound healing  DVT per primary team, hx provoked DVT 13 months ago  	  Nate Bass MD  Attending Orthopedic Surgeon

## 2019-02-13 NOTE — PROGRESS NOTE ADULT - SUBJECTIVE AND OBJECTIVE BOX
Patient is a 78y old  Female who presents with a chief complaint of Cellulitis (2019 09:49)      SUBJECTIVE / OVERNIGHT EVENTS:  her long time aide Nannette at bedside.  mental status is improving.  complains of back pain from wounds. She is on oxy q6hrs, here been holding to q8hr due to AMS.  pt awake alert, meds readjusted to home dose. her encephalopathy is due to sepsis.   no cp, no sob.  no n/v/d.       Vital Signs Last 24 Hrs  T(C): 37.7 (2019 15:37), Max: 37.7 (2019 15:37)  T(F): 99.8 (2019 15:37), Max: 99.8 (2019 15:37)  HR: 99 (2019 15:37) (78 - 99)  BP: 115/75 (2019 15:37) (108/65 - 150/57)  BP(mean): --  RR: 21 (2019 15:37) (18 - 128)  SpO2: 96% (2019 15:37) (94% - 100%)  I&O's Summary      PHYSICAL EXAM:  GENERAL: NAD, Comfortable, obese lady, lying in bed  HEAD:  Atraumatic, Normocephalic  EYES: EOMI, PERRLA, conjunctiva and sclera clear  NECK: Supple, No JVD  CHEST/LUNG: mild decrease breath sounds bilaterally; No wheeze   HEART: Regular rate and rhythm; No murmurs, rubs, or gallops  ABDOMEN: Soft, Nontender, Nondistended; Bowel sounds present  Neuro: AAO x 2, no focal deficit  EXTREMITIES:  2+ Peripheral Pulses, No clubbing, cyanosis. stable nonpitting edema  Back: scaral and thigh wound. wound vac  SKIN: No rashes or lesions    LABS:                        9.0    13.69 )-----------( 275      ( 2019 10:15 )             31.4     02-13    141  |  105  |  25<H>  ----------------------------<  125<H>  4.9   |  21<L>  |  1.49<H>    Ca    9.4      2019 07:50  Phos  5.4     02-13  Mg     1.7     02-13    TPro  6.6  /  Alb  3.1<L>  /  TBili  0.5  /  DBili  x   /  AST  14  /  ALT  9<L>  /  AlkPhos  107  02-12    PT/INR - ( 2019 11:49 )   PT: 13.0 sec;   INR: 1.13 ratio         PTT - ( 2019 11:49 )  PTT:39.3 sec  CAPILLARY BLOOD GLUCOSE            Urinalysis Basic - ( 2019 12:31 )    Color: Yellow / Appearance: Clear / S.020 / pH: x  Gluc: x / Ketone: Trace  / Bili: Negative / Urobili: Negative   Blood: x / Protein: 30 mg/dL / Nitrite: Negative   Leuk Esterase: Negative / RBC: 4 /hpf / WBC 3 /HPF   Sq Epi: x / Non Sq Epi: 2 /hpf / Bacteria: Negative        RADIOLOGY & ADDITIONAL TESTS:    Imaging Personally Reviewed:  [x] YES  [ ] NO    Consultant(s) Notes Reviewed:  [x] YES  [ ] NO      MEDICATIONS  (STANDING):  amLODIPine   Tablet 10 milliGRAM(s) Oral daily  ascorbic acid 500 milliGRAM(s) Oral daily  aspirin enteric coated 81 milliGRAM(s) Oral daily  calcium carbonate 1250 mG  + Vitamin D (OsCal 500 + D) 1 Tablet(s) Oral three times a day  gabapentin 300 milliGRAM(s) Oral two times a day  heparin  Injectable 5000 Unit(s) SubCutaneous every 8 hours  melatonin 1 milliGRAM(s) Oral at bedtime  meropenem  IVPB      meropenem  IVPB 500 milliGRAM(s) IV Intermittent every 8 hours  metoprolol succinate ER 25 milliGRAM(s) Oral daily  pantoprazole    Tablet 40 milliGRAM(s) Oral before breakfast  sodium chloride 0.9%. 1000 milliLiter(s) (90 mL/Hr) IV Continuous <Continuous>  vancomycin  IVPB        MEDICATIONS  (PRN):  acetaminophen   Tablet .. 650 milliGRAM(s) Oral every 6 hours PRN Mild Pain (1 - 3)  oxyCODONE    5 mG/acetaminophen 325 mG 1 Tablet(s) Oral every 6 hours PRN Severe Pain (7 - 10)      Care Discussed with Consultants/Other Providers [x] YES  [ ] NO    HEALTH ISSUES - PROBLEM Dx:  Depression, unspecified depression type: Depression, unspecified depression type  Medication management: Medication management  Gastroesophageal reflux disease, esophagitis presence not specified: Gastroesophageal reflux disease, esophagitis presence not specified  Aortic stenosis, severe: Aortic stenosis, severe  Coronary artery disease involving native coronary artery of native heart without angina pectoris: Coronary artery disease involving native coronary artery of native heart without angina pectoris  Altered mental status, unspecified altered mental status type: Altered mental status, unspecified altered mental status type  Cellulitis of leg, right: Cellulitis of leg, right

## 2019-02-13 NOTE — PROVIDER CONTACT NOTE (CRITICAL VALUE NOTIFICATION) - SITUATION
Patient admitted from ED Alert awake Patient admitted from ED Alert awake .Bld cx from 2/12/19 Growth in Aerobic bottle Gram positive Cocci in clusters.

## 2019-02-13 NOTE — PROGRESS NOTE ADULT - ATTENDING COMMENTS
- Dr. DENISSE Palumbo ([facility])  - [phone] Addendum: Medications reconciled.   D/w the son Chino: home: [phone], cell: [phone]  She no longer takes Xarelto. (completed course for treatment of DVT a few months ago)  - cyclosporine 200 mg BID (for wounds/pyoderma), with MgOxide supplement  - asa 81 mg  - off of Doxycyline 100 mg BID for past 10 days (was told by Bertha Barone RN to stop)  - on taper dose of prednisone, currently on 5 mg until this Saturday 2/16/19 last dose  - metoprolol 25 mg BID  - atovastatin 10 mg  - melatonin PRN, Miralax, colace 2 tabs at night, vit D 1000 units, vit B complex, Iron supplements  - pantoprozole     - Dr. DENISSE Palumbo ([facility])  - [phone] Addendum: Medications reconciled.   D/w the son [place]ino: home: [phone], cell: [phone]  d/w the daughter and the grand daughter who is physician.   see chart note.     - Dr. DENISSE Palumbo ([facility])  - [phone]

## 2019-02-13 NOTE — PROGRESS NOTE ADULT - ASSESSMENT
78 F PMH of CAD s/p stent to LAD, AS s/p TAVR, PPM, DVT (Jan '18) on coumadin, IVC filter, pyoderma gangrenosum, hx MRSA infections, and total knee replacement c/b joint infections s/p I&D explantation and spacer placement followed by spacer removal and static spacer placement with joint fusion due to infection of initial spacer with complex wound closure by plastic surgery, admission here in November 2018 for hip and thigh wounds, now presenting with confusion and chills at home associated with poor appetite and confusion x 1 week and not eating x 2 days. Noted with fever here, YONATAN on labwork. R knee evaluated by orthopedic surgery.

## 2019-02-13 NOTE — PROGRESS NOTE ADULT - PROBLEM SELECTOR PLAN 1
s/p vancomycin, monitor vanc trough.  Given septic encephalopathy and high fever, broaden spectrum with Meropenem.  f/u blood cultures.  ID consult appreciated  Will temporarily hold cyclosporine overnight given concern for recurrent infection, need to clarify with her prescribing doctors regarding this.  Patient also on chronic steroids for pyoderma though per aide is on very low dose, need to clarify dose (not on pharmacy records electronically obtained), doubt need for stress dose steroids at this time but likely should stay on low dose steroids once dose verified.  Per orthopedic surgery can be OOB primarily to wheelchair and can 50% weight bear on R leg if straight in immobilizer.

## 2019-02-14 LAB
ANION GAP SERPL CALC-SCNC: 17 MMOL/L — SIGNIFICANT CHANGE UP (ref 5–17)
BUN SERPL-MCNC: 22 MG/DL — SIGNIFICANT CHANGE UP (ref 7–23)
CALCIUM SERPL-MCNC: 9.3 MG/DL — SIGNIFICANT CHANGE UP (ref 8.4–10.5)
CHLORIDE SERPL-SCNC: 106 MMOL/L — SIGNIFICANT CHANGE UP (ref 96–108)
CO2 SERPL-SCNC: 20 MMOL/L — LOW (ref 22–31)
CREAT SERPL-MCNC: 1.16 MG/DL — SIGNIFICANT CHANGE UP (ref 0.5–1.3)
GLUCOSE SERPL-MCNC: 94 MG/DL — SIGNIFICANT CHANGE UP (ref 70–99)
GRAM STN FLD: SIGNIFICANT CHANGE UP
GRAM STN FLD: SIGNIFICANT CHANGE UP
POTASSIUM SERPL-MCNC: 4.5 MMOL/L — SIGNIFICANT CHANGE UP (ref 3.5–5.3)
POTASSIUM SERPL-SCNC: 4.5 MMOL/L — SIGNIFICANT CHANGE UP (ref 3.5–5.3)
SODIUM SERPL-SCNC: 143 MMOL/L — SIGNIFICANT CHANGE UP (ref 135–145)

## 2019-02-14 PROCEDURE — 99232 SBSQ HOSP IP/OBS MODERATE 35: CPT

## 2019-02-14 PROCEDURE — 99223 1ST HOSP IP/OBS HIGH 75: CPT

## 2019-02-14 RX ORDER — ATORVASTATIN CALCIUM 80 MG/1
10 TABLET, FILM COATED ORAL AT BEDTIME
Qty: 0 | Refills: 0 | Status: DISCONTINUED | OUTPATIENT
Start: 2019-02-14 | End: 2019-03-25

## 2019-02-14 RX ORDER — LANOLIN ALCOHOL/MO/W.PET/CERES
3 CREAM (GRAM) TOPICAL AT BEDTIME
Qty: 0 | Refills: 0 | Status: DISCONTINUED | OUTPATIENT
Start: 2019-02-14 | End: 2019-03-06

## 2019-02-14 RX ORDER — ESCITALOPRAM OXALATE 10 MG/1
5 TABLET, FILM COATED ORAL DAILY
Qty: 0 | Refills: 0 | Status: DISCONTINUED | OUTPATIENT
Start: 2019-02-14 | End: 2019-03-25

## 2019-02-14 RX ORDER — FOLIC ACID 0.8 MG
1 TABLET ORAL DAILY
Qty: 0 | Refills: 0 | Status: DISCONTINUED | OUTPATIENT
Start: 2019-02-14 | End: 2019-03-25

## 2019-02-14 RX ORDER — OXYCODONE AND ACETAMINOPHEN 5; 325 MG/1; MG/1
1 TABLET ORAL ONCE
Qty: 0 | Refills: 0 | Status: DISCONTINUED | OUTPATIENT
Start: 2019-02-14 | End: 2019-02-14

## 2019-02-14 RX ADMIN — MEROPENEM 100 MILLIGRAM(S): 1 INJECTION INTRAVENOUS at 05:59

## 2019-02-14 RX ADMIN — OXYCODONE AND ACETAMINOPHEN 1 TABLET(S): 5; 325 TABLET ORAL at 13:30

## 2019-02-14 RX ADMIN — HEPARIN SODIUM 5000 UNIT(S): 5000 INJECTION INTRAVENOUS; SUBCUTANEOUS at 13:13

## 2019-02-14 RX ADMIN — ESCITALOPRAM OXALATE 5 MILLIGRAM(S): 10 TABLET, FILM COATED ORAL at 21:43

## 2019-02-14 RX ADMIN — Medication 1 MILLIGRAM(S): at 12:26

## 2019-02-14 RX ADMIN — OXYCODONE AND ACETAMINOPHEN 1 TABLET(S): 5; 325 TABLET ORAL at 13:11

## 2019-02-14 RX ADMIN — PANTOPRAZOLE SODIUM 40 MILLIGRAM(S): 20 TABLET, DELAYED RELEASE ORAL at 05:58

## 2019-02-14 RX ADMIN — Medication 1 TABLET(S): at 05:59

## 2019-02-14 RX ADMIN — Medication 1 TABLET(S): at 12:27

## 2019-02-14 RX ADMIN — Medication 500 MILLIGRAM(S): at 12:26

## 2019-02-14 RX ADMIN — Medication 81 MILLIGRAM(S): at 12:26

## 2019-02-14 RX ADMIN — OXYCODONE AND ACETAMINOPHEN 1 TABLET(S): 5; 325 TABLET ORAL at 11:12

## 2019-02-14 RX ADMIN — Medication 25 MILLIGRAM(S): at 05:58

## 2019-02-14 RX ADMIN — AMLODIPINE BESYLATE 10 MILLIGRAM(S): 2.5 TABLET ORAL at 05:58

## 2019-02-14 RX ADMIN — HEPARIN SODIUM 5000 UNIT(S): 5000 INJECTION INTRAVENOUS; SUBCUTANEOUS at 06:04

## 2019-02-14 RX ADMIN — GABAPENTIN 300 MILLIGRAM(S): 400 CAPSULE ORAL at 05:59

## 2019-02-14 RX ADMIN — GABAPENTIN 300 MILLIGRAM(S): 400 CAPSULE ORAL at 17:26

## 2019-02-14 RX ADMIN — Medication 5 MILLIGRAM(S): at 05:58

## 2019-02-14 RX ADMIN — OXYCODONE AND ACETAMINOPHEN 1 TABLET(S): 5; 325 TABLET ORAL at 06:51

## 2019-02-14 RX ADMIN — Medication 5 MILLIGRAM(S): at 21:43

## 2019-02-14 RX ADMIN — OXYCODONE AND ACETAMINOPHEN 1 TABLET(S): 5; 325 TABLET ORAL at 07:21

## 2019-02-14 RX ADMIN — HEPARIN SODIUM 5000 UNIT(S): 5000 INJECTION INTRAVENOUS; SUBCUTANEOUS at 21:43

## 2019-02-14 RX ADMIN — Medication 3 MILLIGRAM(S): at 21:43

## 2019-02-14 RX ADMIN — ATORVASTATIN CALCIUM 10 MILLIGRAM(S): 80 TABLET, FILM COATED ORAL at 21:43

## 2019-02-14 RX ADMIN — Medication 250 MILLIGRAM(S): at 12:27

## 2019-02-14 RX ADMIN — Medication 1 TABLET(S): at 13:13

## 2019-02-14 RX ADMIN — Medication 1 TABLET(S): at 21:43

## 2019-02-14 RX ADMIN — Medication 1000 UNIT(S): at 11:14

## 2019-02-14 RX ADMIN — POLYETHYLENE GLYCOL 3350 17 GRAM(S): 17 POWDER, FOR SOLUTION ORAL at 12:27

## 2019-02-14 NOTE — PHYSICAL THERAPY INITIAL EVALUATION ADULT - NS ASR WT BEARING DETAIL RLE
partial weight-bearing/50% in extension, knee immobilizer 50% in extension, knee immobilizer at all times/partial weight-bearing

## 2019-02-14 NOTE — BEHAVIORAL HEALTH ASSESSMENT NOTE - AXIS III
PMH of significant cardiac hx, h/o DVT/PE, total knee replacement c/b joint infections and pyoderma gangrenosum, p/w SIRS, suspected 2/2 wound infection, MRSA

## 2019-02-14 NOTE — PHYSICAL THERAPY INITIAL EVALUATION ADULT - GENERAL OBSERVATIONS, REHAB EVAL
Received in bed in [facility], HHA at bedside. Received in bed in [facility], LOLY at bedside. (2/20: Pt reporting 10/10 L thigh pain; RN Ferny at bedside to give pain meds)

## 2019-02-14 NOTE — DIETITIAN INITIAL EVALUATION ADULT. - NS AS NUTRI INTERV ED CONTENT
Discussed c aides primarily at this time as pt being examined, encouraged protein consumption c all meals and continuing balanced meals.

## 2019-02-14 NOTE — PROGRESS NOTE ADULT - ATTENDING COMMENTS
Addendum: Medications reconciled.   D/w the spouse [place]: home: [phone], cell: [phone]  the spouse requesting Derm eval, pscy eval.   see chart note.     - Dr. DENISSE Palumbo ([facility])  - [phone]

## 2019-02-14 NOTE — BEHAVIORAL HEALTH ASSESSMENT NOTE - CASE SUMMARY
I interviewed pt with MsTray Jaimee NP, pt expressed no desire to talk. She denied any SI or plans. She has home health aide with her 24/7,  denied any current Suicidal risk.   He agreed to start Lexapro 5 mg , pt to be on enhanced supervision.

## 2019-02-14 NOTE — PHYSICAL THERAPY INITIAL EVALUATION ADULT - PRECAUTIONS/LIMITATIONS, REHAB EVAL
now p/w confusion and chills at home associated with poor appetite and confusion x 1 week and not eating x 2 days. There is some erythema surrounding the right knee that aide believes is somewhat worse than baseline over last 2-3 days but no exudates. Other wounds have been healing well per aide and she has wound vac on R hip and dressings in place on left thigh/glute. +fever, YONATAN. Xray R knee: Increasing lucency adjacent to the medial aspect of the femoral component cement, suggestive of possible loosening. CXR: The heart is enlarged. Elevation right hemidiaphragm. The lungs appear to be clear. No radiographic evidence for congestive heart failure. A pacer is in good position. No change in the appearance of the chest when compared to previous study done November 12, 2018. fall precautions/now p/w confusion and chills at home associated with poor appetite and confusion x 1 week and not eating x 2 days. There is some erythema surrounding the right knee that aide believes is somewhat worse than baseline over last 2-3 days but no exudates. Other wounds have been healing well per aide and she has wound vac on R hip and dressings in place on left thigh/glute. +fever, YONATAN. Xray R knee: Increasing lucency adjacent to the medial aspect of the femoral component cement, suggestive of possible loosening. CXR: The heart is enlarged. Elevation right hemidiaphragm. The lungs appear to be clear. No radiographic evidence for congestive heart failure. A pacer is in good position. No change in the appearance of the chest when compared to previous study done November 12, 2018. fall precautions/now p/w confusion and chills at home associated with poor appetite and confusion x 1 week and not eating x 2 days. There is some erythema surrounding the right knee that aide believes is somewhat worse than baseline over last 2-3 days but no exudates. Other wounds have been healing well per aide and she has wound vac on R hip and dressings in place on left thigh/glute. +fever, YONATAN. Xray R knee: Increasing lucency adjacent to the medial aspect of the femoral component cement, suggestive of possible loosening. CXR: The heart is enlarged. Elevation right hemidiaphragm. The lungs appear to be clear. No radiographic evidence for congestive heart failure. A pacer is in good position. No change in the appearance of the chest when compared to previous study done November 12, 2018. NO RIGHT KNEE MOTION now p/w confusion and chills at home associated with poor appetite and confusion x 1 week and not eating x 2 days. There is some erythema surrounding the right knee that aide believes is somewhat worse than baseline over last 2-3 days but no exudates. Other wounds have been healing well per aide and she has wound vac on R hip and dressings in place on left thigh/glute. +fever, YONATAN. Xray R knee: Increasing lucency adjacent to the medial aspect of the femoral component cement, suggestive of possible loosening. CXR: The heart is enlarged. Elevation right hemidiaphragm. The lungs appear to be clear. No radiographic evidence for congestive heart failure. A pacer is in good position. No change in the appearance of the chest when compared to previous study done November 12, 2018. NO RIGHT KNEE MOTION/fall precautions/isolation precautions

## 2019-02-14 NOTE — DIETITIAN INITIAL EVALUATION ADULT. - ORAL INTAKE PTA
except for a few days PTA when she was not feeling well and did not want to eat; prior to that pt was consuming 3 meals daily, consumes mostly eggs, cheese, bread, and salads, does not eat too much chicken/fish/meat but will have occasionally; takes Howard supplement at home/good

## 2019-02-14 NOTE — DIETITIAN INITIAL EVALUATION ADULT. - NS AS NUTRI INTERV MEDICAL AND FOOD SUPPLEMENTS
Recommend Howard x 2 packets daily to provide conditionally essential amino acids to promote wound healing.

## 2019-02-14 NOTE — DIETITIAN INITIAL EVALUATION ADULT. - PROBLEM SELECTOR PLAN 1
s/p vancomycin, given YONATAN will recheck level in AM and will need to be dosed by level unless creatinine improved.  Will add ceftriaxone at this time to cover for other spp, has tolerated cephalosporins in past.  ID consult in AM w/[facility] ID  Will temporarily hold cyclosporine overnight given concern for recurrent infection, need to clarify with her prescribing doctors regarding this.  Patient also on chronic steroids for pyoderma though per aide is on very low dose, need to clarify dose (not on pharmacy records electronically obtained), doubt need for stress dose steroids at this time but likely should stay on low dose steroids once dose verified.   coming in AM and will have med list.  Per orthopedic surgery can be OOB primarily to wheelchair and can 50% weight bear on R leg if straight in immobilizer.

## 2019-02-14 NOTE — PHYSICAL THERAPY INITIAL EVALUATION ADULT - ACTIVE RANGE OF MOTION EXAMINATION, REHAB EVAL
BUE Active assistive AROM WFL; RLE not assessed due to ortho restrictions; LLE not assessed due to pt's refusal due to pain

## 2019-02-14 NOTE — PROGRESS NOTE ADULT - PROBLEM SELECTOR PLAN 1
s/p vancomycin, monitor vanc trough.  Given septic encephalopathy and high fever, broaden spectrum with Meropenem.  f/u blood cultures.  ID consult appreciated  Will temporarily hold cyclosporine given concern for recurrent infection.  previously seen by Dermatology   Patient also on chronic steroids for pyoderma.   Currently on Prednisone 5 mg qdaily until this saturaday last dose)  Per orthopedic surgery can be OOB primarily to wheelchair and can 50% weight bear on R leg if straight in immobilizer.  ID f/u for Coags Neg staph bacteremia. wound care consulted.

## 2019-02-14 NOTE — PROGRESS NOTE ADULT - SUBJECTIVE AND OBJECTIVE BOX
CC: f/u for  fever, bacteremia  Patient reports  she is feeling better  REVIEW OF SYSTEMS:  All other review of systems negative (Comprehensive ROS)    Antimicrobials Day #  :2  meropenem  IVPB      meropenem  IVPB 500 milliGRAM(s) IV Intermittent every 8 hours  vancomycin  IVPB 1000 milliGRAM(s) IV Intermittent every 24 hours  vancomycin  IVPB        Other Medications Reviewed    T(F): 98 (02-14-19 @ 07:35), Max: 98.4 (02-14-19 @ 04:45)  HR: 90 (02-14-19 @ 07:35)  BP: 116/70 (02-14-19 @ 07:35)  RR: 20 (02-14-19 @ 07:35)  SpO2: 95% (02-14-19 @ 07:35)  Wt(kg): --    PHYSICAL EXAM:  General: alert, no acute distress  Eyes:  anicteric, no conjunctival injection, no discharge  Oropharynx: no lesions or injection 	  Neck: supple, without adenopathy  Lungs: decreased at bases to auscultation  Heart: regular rate and rhythm; no murmur, rubs or gallops  Abdomen: soft, nondistended, nontender, without mass or organomegaly  Skin: no lesions  Extremities: no clubbing, cyanosis, . knee wound is healed, vac over right thigh  Neurologic: alert, oriented, moves all extremities    LAB RESULTS:                        8.9    13.0  )-----------( 248      ( 14 Feb 2019 09:53 )             27.2     02-14    143  |  106  |  22  ----------------------------<  94  4.5   |  20<L>  |  1.16    Ca    9.3      14 Feb 2019 09:33  Phos  5.4     02-13  Mg     1.7     02-13          MICROBIOLOGY:  RECENT CULTURES:  02-12 @ 17:38 .Urine Clean Catch (Midstream)     No growth      02-12 @ 15:55 .Blood Blood-Peripheral Blood Culture PCR    Growth in aerobic bottle: Gram Positive Cocci in Clusters  "Due to technical problems, Proteus sp. will Not be reported as part of  the BCID panel until further notice"  ***Blood Panel PCR results on this specimen are available  approximately 3 hours after the Gram stain result.***  Gram stain, PCR, and/or culture results may not always  correspond due to difference in methodologies.  ************************************************************  This PCR assay was performed using [employer].  The following targets are tested for: Enterococcus,  vancomycin resistant enterococci, Listeria monocytogenes,  coagulase negative staphylococci, S. aureus,  methicillin resistant S. aureus, Streptococcus agalactiae  (Group B), S. pneumoniae, S.pyogenes (Group A),  Acinetobacter baumannii, Enterobacter cloacae, E. coli,  Klebsiella oxytoca, K. pneumoniae, Proteus sp.,  Serratia marcescens, Haemophilus influenzae,  Neisseria meningitidis, Pseudomonas aeruginosa, Candida  albicans, C. glabrata, C krusei, C parapsilosis,  C. tropicalis and the KPC resistance gene.  Growth in anaerobic bottle: Gram Positive Cocci in Clusters    Growth in aerobic bottle: Gram Positive Cocci in Clusters  Growth in anaerobic bottle: Gram Positive Cocci in Clusters        RADIOLOGY REVIEWED:  < from: Xray Knee 4 Views, Right (02.12.19 @ 12:41) >  IMPRESSION:  Increasing lucency adjacent to the medial aspect of the femoral component   cement, suggestive of possible loosening.      < end of copied text >      Assessment:  Patient with ppm, tavr, had right tkr infection s/p rudy and spacer with strept sp bacteremia then wound failed to heal, exchange of spacer done and mrsa grew. She is supposed to be on suppressive doxycycline. She has pyoderma gangrenosa on cyclosporin and has a persistent wound that is healing on the right thigh. She had staph epi bacteremia from a picc in sept and zoe was neg for veg. She has been treated with abx for appendicitis. She now comes back with fever and coag neg staph bacteremia. I am concerned she has relapse of staph epi bacteremia and the tavr and/or ppm are infected. Await ID of organism No other infection source is apparent.   Plan:  continue iv vanco, will stop meropenem  follow up latest cultures  needs repeat zoe, possible ppm pull, doubt a redo tavr would be tolerated  local wound care per dr ramirez CC: f/u for  fever, bacteremia  Patient reports  she is feeling better  REVIEW OF SYSTEMS:  All other review of systems negative (Comprehensive ROS)    Antimicrobials Day #  :2  meropenem  IVPB      meropenem  IVPB 500 milliGRAM(s) IV Intermittent every 8 hours  vancomycin  IVPB 1000 milliGRAM(s) IV Intermittent every 24 hours  vancomycin  IVPB        Other Medications Reviewed    T(F): 98 (02-14-19 @ 07:35), Max: 98.4 (02-14-19 @ 04:45)  HR: 90 (02-14-19 @ 07:35)  BP: 116/70 (02-14-19 @ 07:35)  RR: 20 (02-14-19 @ 07:35)  SpO2: 95% (02-14-19 @ 07:35)  Wt(kg): --    PHYSICAL EXAM:  General: alert, no acute distress  Eyes:  anicteric, no conjunctival injection, no discharge  Oropharynx: no lesions or injection 	  Neck: supple, without adenopathy  Lungs: decreased at bases to auscultation  Heart: regular rate and rhythm; no murmur, rubs or gallops  Abdomen: soft, nondistended, nontender, without mass or organomegaly  Skin: no lesions  Extremities: no clubbing, cyanosis, . knee wound is healed, vac over right thigh  Neurologic: alert, oriented, moves all extremities    LAB RESULTS:                        8.9    13.0  )-----------( 248      ( 14 Feb 2019 09:53 )             27.2     02-14    143  |  106  |  22  ----------------------------<  94  4.5   |  20<L>  |  1.16    Ca    9.3      14 Feb 2019 09:33  Phos  5.4     02-13  Mg     1.7     02-13          MICROBIOLOGY:  RECENT CULTURES:  02-12 @ 17:38 .Urine Clean Catch (Midstream)     No growth      02-12 @ 15:55 .Blood Blood-Peripheral Blood Culture PCR    Growth in aerobic bottle: Gram Positive Cocci in Clusters  "Due to technical problems, Proteus sp. will Not be reported as part of  the BCID panel until further notice"  ***Blood Panel PCR results on this specimen are available  approximately 3 hours after the Gram stain result.***  Gram stain, PCR, and/or culture results may not always  correspond due to difference in methodologies.  ************************************************************  This PCR assay was performed using [employer].  The following targets are tested for: Enterococcus,  vancomycin resistant enterococci, Listeria monocytogenes,  coagulase negative staphylococci, S. aureus,  methicillin resistant S. aureus, Streptococcus agalactiae  (Group B), S. pneumoniae, S.pyogenes (Group A),  Acinetobacter baumannii, Enterobacter cloacae, E. coli,  Klebsiella oxytoca, K. pneumoniae, Proteus sp.,  Serratia marcescens, Haemophilus influenzae,  Neisseria meningitidis, Pseudomonas aeruginosa, Candida  albicans, C. glabrata, C krusei, C parapsilosis,  C. tropicalis and the KPC resistance gene.  Growth in anaerobic bottle: Gram Positive Cocci in Clusters    Growth in aerobic bottle: Gram Positive Cocci in Clusters  Growth in anaerobic bottle: Gram Positive Cocci in Clusters        RADIOLOGY REVIEWED:  < from: Xray Knee 4 Views, Right (02.12.19 @ 12:41) >  IMPRESSION:  Increasing lucency adjacent to the medial aspect of the femoral component   cement, suggestive of possible loosening.      < end of copied text >      Assessment:  Patient with ppm, tavr, had right tkr infection s/p rudy and spacer with strept sp bacteremia then wound failed to heal, exchange of spacer done and mrsa grew. She is supposed to be on suppressive doxycycline. She has pyoderma gangrenosa on cyclosporin and has a persistent wound that is healing on the right thigh. She had staph epi bacteremia from a picc in sept and zoe was neg for veg. She has been treated with abx for appendicitis. She now comes back with fever and coag neg staph bacteremia. I am concerned she has relapse of staph epi bacteremia and the tavr and/or ppm are infected. Await ID of organism No other infection source is apparent.   Plan:  continue iv vanco, will stop meropenem  follow up latest cultures, await ID of positive culture to see if relapse likely  needs repeat zoe, possible ppm pull, doubt a redo tavr would be tolerated  local wound care per dr ramirez

## 2019-02-14 NOTE — DIETITIAN INITIAL EVALUATION ADULT. - SOURCE
other (specify)/patient/private aides (Gladis and Arleen) at bedside, Dr. Joseph at bedside as well assessing wounds

## 2019-02-14 NOTE — PROGRESS NOTE ADULT - SUBJECTIVE AND OBJECTIVE BOX
not in pain  blood cultures positive    NAD  RLE  bilateral thigh incisions dressed, +wound vac on right side holding suction  knee wound healed, no surrounding knee erythema and no drainage or fluctuance  5/5 ta/ehl/gcs  silt l4-s1  2+ dp pulse     ros: depressed, sad

## 2019-02-14 NOTE — PHYSICAL THERAPY INITIAL EVALUATION ADULT - MODALITIES TREATMENT COMMENTS
Full thickness wound to R upper buttock measuring 4cm x 22 cm x 2 cm. Wound clean, 100% granulation tissue. No purulence, no erythema, no malodor.

## 2019-02-14 NOTE — PROGRESS NOTE ADULT - SUBJECTIVE AND OBJECTIVE BOX
Patient is a 78y old  Female who presents with a chief complaint of Cellulitis (2019 18:23)      SUBJECTIVE / OVERNIGHT EVENTS:  mental status back to baseline.  the spouse Mr. Magana at bedside.  denied pain.  "thirsty"  eating a little better.  no cp, no sob. no n/v/d.       Vital Signs Last 24 Hrs  T(C): 36.9 (2019 04:45), Max: 37.7 (2019 15:37)  T(F): 98.4 (2019 04:45), Max: 99.8 (2019 15:37)  HR: 89 (2019 04:45) (89 - 99)  BP: 122/73 (2019 04:45) (114/70 - 122/73)  BP(mean): --  RR: 20 (2019 04:45) (20 - 21)  SpO2: 94% (2019 04:45) (94% - 96%)  I&O's Summary      PHYSICAL EXAM:  GENERAL: NAD, Comfortable, obese lady, lying in bed  HEAD:  Atraumatic, Normocephalic  EYES: EOMI, PERRLA, conjunctiva and sclera clear  NECK: Supple, No JVD  CHEST/LUNG: mild decrease breath sounds bilaterally; No wheeze   HEART: Regular rate and rhythm; No murmurs, rubs, or gallops  ABDOMEN: Soft, Nontender, Nondistended; Bowel sounds present  Neuro: AAO x 2, no focal deficit  EXTREMITIES:  2+ Peripheral Pulses, No clubbing, cyanosis. stable nonpitting edema  Back: scaral and thigh wound. wound vac  SKIN: No rashes or lesions        LABS:                        9.0    13.69 )-----------( 275      ( 2019 10:15 )             31.4     02-13    141  |  105  |  25<H>  ----------------------------<  125<H>  4.9   |  21<L>  |  1.49<H>    Ca    9.4      2019 07:50  Phos  5.4     02-13  Mg     1.7     02-13    TPro  6.6  /  Alb  3.1<L>  /  TBili  0.5  /  DBili  x   /  AST  14  /  ALT  9<L>  /  AlkPhos  107  02-12    PT/INR - ( 2019 11:49 )   PT: 13.0 sec;   INR: 1.13 ratio         PTT - ( 2019 11:49 )  PTT:39.3 sec  CAPILLARY BLOOD GLUCOSE            Urinalysis Basic - ( 2019 12:31 )    Color: Yellow / Appearance: Clear / S.020 / pH: x  Gluc: x / Ketone: Trace  / Bili: Negative / Urobili: Negative   Blood: x / Protein: 30 mg/dL / Nitrite: Negative   Leuk Esterase: Negative / RBC: 4 /hpf / WBC 3 /HPF   Sq Epi: x / Non Sq Epi: 2 /hpf / Bacteria: Negative        RADIOLOGY & ADDITIONAL TESTS:    Imaging Personally Reviewed:  [x] YES  [ ] NO    Consultant(s) Notes Reviewed:  [x] YES  [ ] NO      MEDICATIONS  (STANDING):  amLODIPine   Tablet 10 milliGRAM(s) Oral daily  ascorbic acid 500 milliGRAM(s) Oral daily  aspirin enteric coated 81 milliGRAM(s) Oral daily  bisacodyl 5 milliGRAM(s) Oral at bedtime  calcium carbonate 1250 mG  + Vitamin D (OsCal 500 + D) 1 Tablet(s) Oral three times a day  cholecalciferol 1000 Unit(s) Oral daily  gabapentin 300 milliGRAM(s) Oral two times a day  heparin  Injectable 5000 Unit(s) SubCutaneous every 8 hours  melatonin 1 milliGRAM(s) Oral at bedtime  meropenem  IVPB      meropenem  IVPB 500 milliGRAM(s) IV Intermittent every 8 hours  metoprolol succinate ER 25 milliGRAM(s) Oral daily  pantoprazole    Tablet 40 milliGRAM(s) Oral before breakfast  polyethylene glycol 3350 17 Gram(s) Oral daily  predniSONE   Tablet 5 milliGRAM(s) Oral daily  sodium chloride 0.9%. 1000 milliLiter(s) (90 mL/Hr) IV Continuous <Continuous>  vancomycin  IVPB 1000 milliGRAM(s) IV Intermittent every 24 hours  vancomycin  IVPB      vitamin B complex with vitamin C 1 Tablet(s) Oral daily    MEDICATIONS  (PRN):  acetaminophen   Tablet .. 650 milliGRAM(s) Oral every 6 hours PRN Mild Pain (1 - 3)  oxyCODONE    5 mG/acetaminophen 325 mG 1 Tablet(s) Oral every 6 hours PRN Severe Pain (7 - 10)      Care Discussed with Consultants/Other Providers [x] YES  [ ] NO    HEALTH ISSUES - PROBLEM Dx:  Depression, unspecified depression type: Depression, unspecified depression type  Medication management: Medication management  Gastroesophageal reflux disease, esophagitis presence not specified: Gastroesophageal reflux disease, esophagitis presence not specified  Aortic stenosis, severe: Aortic stenosis, severe  Coronary artery disease involving native coronary artery of native heart without angina pectoris: Coronary artery disease involving native coronary artery of native heart without angina pectoris  Altered mental status, unspecified altered mental status type: Altered mental status, unspecified altered mental status type  Cellulitis of leg, right: Cellulitis of leg, right

## 2019-02-14 NOTE — DIETITIAN INITIAL EVALUATION ADULT. - OTHER INFO
Pt seen for consult for "pressure injury stage 2 or greater" and "wound healing, decreased appetite." As per aides, pt's weight has been stable the last few months at 250 pounds, from previous RD notes and follow ups, it seems wt has been around 250 pounds since end of October 2018. Noted pt weighed about 265 pounds in January 2018 and then did experience some wt loss and most recently wt has been stable. Aides confirm pt was taking folic acid, vitamin C and multivitamin supplements at home.

## 2019-02-14 NOTE — BEHAVIORAL HEALTH ASSESSMENT NOTE - DESCRIPTION
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

## 2019-02-14 NOTE — PHYSICAL THERAPY INITIAL EVALUATION ADULT - ADDITIONAL COMMENTS
Lives with spouse in a private house. Pt has HHA to assist with ADL. Per HHA at bedside, pt transfers to chair using pt lift device, has been receiving physical therapy at home 2-3x/week and has been working on standing with home PT. Per aide at bedside, pt lives in an apartment with elevator, no stairs to negotiate. Pt was being transferred OOB via pt lift device at all times at home, requiring assist in all ADLs. Private PT was seeing pt at home and per aide, the last time pt attempted to stand up with PT was 2 months ago. Pt owns a hospital bed, wheelchair, pt lift device, commode.

## 2019-02-14 NOTE — BEHAVIORAL HEALTH ASSESSMENT NOTE - NSBHSUICPROTECTFACT_PSY_A_CORE
Supportive social network or family/Ability to cope with stress/Identifies reasons for living/Responsibility to family and others/Future oriented

## 2019-02-14 NOTE — DIETITIAN INITIAL EVALUATION ADULT. - ADHERENCE
fair/low sodium diet per aide, no salt is added to meals but pt does like to eat cheese often; noted pt was on Coumadin at home per chart low sodium diet per aide, no salt is added to meals but pt does like to eat cheese often; noted pt had been on Coumadin in the past, Gladis reports pt has not been on it recently and was on aspirin instead for blood thinner, Gladis is aware of interaction between vitamin K and Coumadin/fair

## 2019-02-14 NOTE — BEHAVIORAL HEALTH ASSESSMENT NOTE - RISK ASSESSMENT
Although patient is at baseline elevated risk of self harm due to ongoing pain and medical illnesses and pain, protective factors include strong familial relationships, home health aides at all times.  At this time patient denies suicidal ideation, intent or plan,  denies concerns for patient's safety; does not require 1:1 observation, recommend to monitor patient on enhanced observation.

## 2019-02-14 NOTE — DIETITIAN INITIAL EVALUATION ADULT. - PHYSICAL APPEARANCE
unable to perform nutrition focused physical exam at this time as pt is being examined, visually no bony prominences/muscle or fat wasting noted at this time/well nourished/overweight

## 2019-02-14 NOTE — DIETITIAN INITIAL EVALUATION ADULT. - NS AS NUTRI INTERV MEALS SNACK
Consider liberalize diet to low sodium at this time, liberalizing diet will allow for more choices and promote intake. Obtained food preferences. Consider liberalize diet to low sodium at this time, liberalizing diet will allow for more choices and promote intake. Obtained food preferences. Monitor renal indices, would not restrict diet at this time given poor intake.

## 2019-02-14 NOTE — BEHAVIORAL HEALTH ASSESSMENT NOTE - SUMMARY
77 y/o   female, domiciled with  and HHA, with no significant PPHx, no substance abuse hx, no prior psychiatric admissions, no prior SA's, with PMHx of HTN and pyoderma w/ gluteal wounds b/l, h/o DVT/PE, total knee replacement c/b joint infections p/w confusion, presenting with confusion and chills at home associated with poor appetite and confusion x 1 week and not eating x 2 days, found to have cellulitis of right leg, admitted to hospital for IV Abx.  Psychiatry consulted to assess for anxiety and suicidality.  Patient seen and evaluated, awake and alert, uncooperative with answering select questions, refuses answering orientation questions, telling writer to leave.  Does endorse feeling more depressed and anxious over her current pain symptoms and recent passing of a close family friend.  Denies SI, plan or intent during evaluation, denies h/o feeling suicidal.  Per , patient stopped eating and taking medications on Sunday, feels as if patient has "given up", does not believe patient is a safety risk, does not believe patient would intentionally harm herself.  Feels patient would benefit from antidepressant therapy.

## 2019-02-14 NOTE — DIETITIAN INITIAL EVALUATION ADULT. - FACTORS AFF FOOD INTAKE
aides reports decreased appetite and intake remains in house at this time, pt reports being in pain, RN and MD aware, has received pain medication; aides report no chewing/swallowing issues; last BM 2/12

## 2019-02-14 NOTE — BEHAVIORAL HEALTH ASSESSMENT NOTE - NSBHCHARTREVIEWLAB_PSY_A_CORE FT
8.9    13.0  )-----------( 248      ( 14 Feb 2019 09:53 )             27.2     02-14    143  |  106  |  22  ----------------------------<  94  4.5   |  20<L>  |  1.16    Ca    9.3      14 Feb 2019 09:33  Phos  5.4     02-13  Mg     1.7     02-13

## 2019-02-14 NOTE — PROGRESS NOTE ADULT - SUBJECTIVE AND OBJECTIVE BOX
SUBJECTIVE: Pt seen, chart reviewed.  77 yo female , approx 1+ yr s/p explant of     Allergies    amoxicillin (Other)    Intolerances    IV Contrast (Flushing (Skin); Nausea)      aspirin enteric coated 81 milliGRAM(s) Oral daily  heparin  Injectable 5000 Unit(s) SubCutaneous every 8 hours  meropenem  IVPB      meropenem  IVPB 500 milliGRAM(s) IV Intermittent every 8 hours  vancomycin  IVPB 1000 milliGRAM(s) IV Intermittent every 24 hours  vancomycin  IVPB          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  Artificial cardiac pacemaker: 12/25/17  H/O cardiac catheterization: 11/29/16 HERBERT placed on LAD  H/O endoscopy: with dilatation of esophageal stricture 2013  S/P cataract surgery: x2; 3-4yr ago  S/P gastroplasty: 28 yrs ago  S/P cholecystectomy: more than 15 years ago  S/P total knee replacement: left, 15yr ago  S/P total knee replacement: right, 12yr ago  S/P hysterectomy: 24yr ago      HEALTH ISSUES - PROBLEM Dx:  Depression, unspecified depression type: Depression, unspecified depression type  Medication management: Medication management  Gastroesophageal reflux disease, esophagitis presence not specified: Gastroesophageal reflux disease, esophagitis presence not specified  Aortic stenosis, severe: Aortic stenosis, severe  Coronary artery disease involving native coronary artery of native heart without angina pectoris: Coronary artery disease involving native coronary artery of native heart without angina pectoris  Altered mental status, unspecified altered mental status type: Altered mental status, unspecified altered mental status type  Cellulitis of leg, right: Cellulitis of leg, right          FAMILY HISTORY:  No pertinent family history in first degree relatives      Physical Exam:  Vital Signs Last 24 Hrs  T(C): 36.7 (14 Feb 2019 07:35), Max: 37.7 (13 Feb 2019 15:37)  T(F): 98 (14 Feb 2019 07:35), Max: 99.8 (13 Feb 2019 15:37)  HR: 90 (14 Feb 2019 07:35) (89 - 99)  BP: 116/70 (14 Feb 2019 07:35) (114/70 - 122/73)  BP(mean): --  RR: 20 (14 Feb 2019 07:35) (20 - 21)  SpO2: 95% (14 Feb 2019 07:35) (94% - 96%)    LABS:                        8.9    13.0  )-----------( 248      ( 14 Feb 2019 09:53 )             27.2     PT/INR - ( 12 Feb 2019 11:49 )   PT: 13.0 sec;   INR: 1.13 ratio         PTT - ( 12 Feb 2019 11:49 )  PTT:39.3 sec      Outpatient follow up information provided- [phone] SUBJECTIVE: Pt seen, chart reviewed.  77 yo female , approx 1+ yr s/p explant of Right TKR, whose recovery complicated by Pyoderma Gangrenosa   Following [facility] d/c in November 2018 , she was an inpatient at [facility] , where she was treated with Cyclosporin, with a Prednisone taper  I have spoken with the treating dermatologist, Dr Roderick Salas, regarding this  She has improved on this regimine  Now admitted with + blood cultures    Allergies    amoxicillin (Other)    Intolerances    IV Contrast (Flushing (Skin); Nausea)      aspirin enteric coated 81 milliGRAM(s) Oral daily  heparin  Injectable 5000 Unit(s) SubCutaneous every 8 hours  meropenem  IVPB      meropenem  IVPB 500 milliGRAM(s) IV Intermittent every 8 hours  vancomycin  IVPB 1000 milliGRAM(s) IV Intermittent every 24 hours  vancomycin  IVPB          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  Artificial cardiac pacemaker: 12/25/17  H/O cardiac catheterization: 11/29/16 HERBERT placed on LAD  H/O endoscopy: with dilatation of esophageal stricture 2013  S/P cataract surgery: x2; 3-4yr ago  S/P gastroplasty: 28 yrs ago  S/P cholecystectomy: more than 15 years ago  S/P total knee replacement: left, 15yr ago  S/P total knee replacement: right, 12yr ago  S/P hysterectomy: 24yr ago      HEALTH ISSUES - PROBLEM Dx:  Depression, unspecified depression type: Depression, unspecified depression type  Medication management: Medication management  Gastroesophageal reflux disease, esophagitis presence not specified: Gastroesophageal reflux disease, esophagitis presence not specified  Aortic stenosis, severe: Aortic stenosis, severe  Coronary artery disease involving native coronary artery of native heart without angina pectoris: Coronary artery disease involving native coronary artery of native heart without angina pectoris  Altered mental status, unspecified altered mental status type: Altered mental status, unspecified altered mental status type  Cellulitis of leg, right: Cellulitis of leg, right          FAMILY HISTORY:  No pertinent family history in first degree relatives      Physical Exam:  Vital Signs Last 24 Hrs  T(C): 36.7 (14 Feb 2019 07:35), Max: 37.7 (13 Feb 2019 15:37)  T(F): 98 (14 Feb 2019 07:35), Max: 99.8 (13 Feb 2019 15:37)  HR: 90 (14 Feb 2019 07:35) (89 - 99)  BP: 116/70 (14 Feb 2019 07:35) (114/70 - 122/73)  BP(mean): --  RR: 20 (14 Feb 2019 07:35) (20 - 21)  SpO2: 95% (14 Feb 2019 07:35) (94% - 96%)    Mandel facies- improved  Obese- non ambulatory  Non cooperative with dressing changes    Scattered wounds of bilateral anterior thighs, lower midline of abdomen, all related to PG , have healed  Residual 2 wounds of left lateral thigh are superficial , and also healing  2 superficial wounds found in left lateral skin fold are also superficial , and not purulent  Right lateral thigh- foul smelling proximal abscess cavity has resolved with a FT, clean, linear wound of approx 16 cm in length, associated with several additional , superficial , non infected  wounds   No cellulitis  All wounds of right thigh have been treated with a VAC for past 1+ month- now only needed for the most proximal wound - other wounds now amenable to Hydrofiber    Is non co-operative with dressing changes, despite pre medication, and somnolent status     Cyclosporin currently on hold   remain available    LABS:                        8.9    13.0  )-----------( 248      ( 14 Feb 2019 09:53 )             27.2     PT/INR - ( 12 Feb 2019 11:49 )   PT: 13.0 sec;   INR: 1.13 ratio         PTT - ( 12 Feb 2019 11:49 )  PTT:39.3 sec      Outpatient follow up information provided- [phone] SUBJECTIVE: Pt seen, chart reviewed.  77 yo female , approx 1+ yr s/p explant of Right TKR, whose recovery has been  complicated by Pyoderma Gangrenosa   Following [facility] d/c in November 2018 , she was an inpatient at [facility] , where she was treated with Cyclosporin, with a Prednisone taper  I have spoken with the treating dermatologist, Dr Roderick Salas, regarding this  She has improved on this regimen  She has been seen 2x in office over the past several months , both times with , Yonatan, present  Now admitted with + blood cultures    Allergies    amoxicillin (Other)    Intolerances    IV Contrast (Flushing (Skin); Nausea)      aspirin enteric coated 81 milliGRAM(s) Oral daily  heparin  Injectable 5000 Unit(s) SubCutaneous every 8 hours  meropenem  IVPB      meropenem  IVPB 500 milliGRAM(s) IV Intermittent every 8 hours  vancomycin  IVPB 1000 milliGRAM(s) IV Intermittent every 24 hours  vancomycin  IVPB          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  Artificial cardiac pacemaker: 12/25/17  H/O cardiac catheterization: 11/29/16 HERBERT placed on LAD  H/O endoscopy: with dilatation of esophageal stricture 2013  S/P cataract surgery: x2; 3-4yr ago  S/P gastroplasty: 28 yrs ago  S/P cholecystectomy: more than 15 years ago  S/P total knee replacement: left, 15yr ago  S/P total knee replacement: right, 12yr ago  S/P hysterectomy: 24yr ago      HEALTH ISSUES - PROBLEM Dx:  Depression, unspecified depression type: Depression, unspecified depression type  Medication management: Medication management  Gastroesophageal reflux disease, esophagitis presence not specified: Gastroesophageal reflux disease, esophagitis presence not specified  Aortic stenosis, severe: Aortic stenosis, severe  Coronary artery disease involving native coronary artery of native heart without angina pectoris: Coronary artery disease involving native coronary artery of native heart without angina pectoris  Altered mental status, unspecified altered mental status type: Altered mental status, unspecified altered mental status type  Cellulitis of leg, right: Cellulitis of leg, right          FAMILY HISTORY:  No pertinent family history in first degree relatives      Physical Exam:  Vital Signs Last 24 Hrs  T(C): 36.7 (14 Feb 2019 07:35), Max: 37.7 (13 Feb 2019 15:37)  T(F): 98 (14 Feb 2019 07:35), Max: 99.8 (13 Feb 2019 15:37)  HR: 90 (14 Feb 2019 07:35) (89 - 99)  BP: 116/70 (14 Feb 2019 07:35) (114/70 - 122/73)  BP(mean): --  RR: 20 (14 Feb 2019 07:35) (20 - 21)  SpO2: 95% (14 Feb 2019 07:35) (94% - 96%)    Mandel facies- improved  Obese- non ambulatory  Non cooperative with dressing changes    Scattered wounds of bilateral anterior thighs, lower midline of abdomen, all related to PG , have healed  Residual 2 wounds of left lateral thigh are superficial , and also healing  2 superficial wounds found in left lateral skin fold are also superficial , and not purulent  Right lateral thigh- foul smelling proximal abscess cavity has resolved with a FT, clean, linear wound of approx 16 cm in length, associated with several additional , more distal,  superficial , non infected  wounds   No cellulitis  All wounds of right thigh have been treated with a VAC for past 1+ month- now only needed for the most proximal wound - other wounds now amenable to Hydrofiber    Has become more co-operative with dressing changes, despite pre medication, and somnolent status     Cyclosporin currently on hold   remain available    LABS:                        8.9    13.0  )-----------( 248      ( 14 Feb 2019 09:53 )             27.2     PT/INR - ( 12 Feb 2019 11:49 )   PT: 13.0 sec;   INR: 1.13 ratio         PTT - ( 12 Feb 2019 11:49 )  PTT:39.3 sec      Outpatient follow up information provided- [phone]

## 2019-02-14 NOTE — PROGRESS NOTE ADULT - ASSESSMENT
no evidence of failed R knee static spacer/fusion    Thigh/buttock wounds are managed by PRS/wound care  appreciate ID recs, positive blood cultures, previously was on lifelong abx for prior infection  Allowed RLE 50% weightbearing on RLE with NO KNEE MOTION allowed, knee immobilizer at all times if OOB  No plan for orthopedic intervention for the R knee, not a good candidate for conversion to TKA or removal of spacer given difficult of wound healing  DVT per primary team, hx provoked DVT 13 months ago  		  Nate Bass MD  Attending Orthopedic Surgeon

## 2019-02-14 NOTE — DIETITIAN INITIAL EVALUATION ADULT. - ENERGY NEEDS
ht:5'4"(per previous RD note) , wt:250 pounds, BMI:42.9 kg/m2, IBW:120 pounds +/- 10% Given scale wt unavailable at this time, will await to place diagnosis of obese, class III at this time    Pt admitted c 2 days of confusion, decreased PO intake, chills, YONATAN, awaiting blood cultures, pt c pyoderma, on chronic steroids, followed by Wound Care; noted per notes pt c some healing wounds, pt c wound VAC to hip wound.

## 2019-02-14 NOTE — BEHAVIORAL HEALTH ASSESSMENT NOTE - HPI (INCLUDE ILLNESS QUALITY, SEVERITY, DURATION, TIMING, CONTEXT, MODIFYING FACTORS, ASSOCIATED SIGNS AND SYMPTOMS)
79 y/o   female, domiciled with  and HHA, with no significant PPHx, no substance abuse hx, no prior psychiatric admissions, no prior SA's, with PMHx of HTN and pyoderma w/ gluteal wounds b/l, h/o DVT/PE, total knee replacement c/b joint infections p/w confusion, presenting with confusion and chills at home associated with poor appetite and confusion x 1 week and not eating x 2 days, found to have cellulitis of right leg, admitted to hospital for IV Abx.  Psychiatry consulted to assess for anxiety and suicidality.    Patient seen and evaluated today, awake and alert, however not cooperative with interview, states "I'm in pain, just go away, go home", states not wanting to speak to writer.  When asked about orientation, unable to state where she is or her name, closes her eyes following being asked, needing multiple attempts and prompts to answer question, however refuses to answer.  When asked about suicidality and if she is currently feeling suicidal, denies.  When asked if she has ever been suicidal in the past, denies.  States that she feels depressed and anxious, speaks of a close family friend who recently passed away, states that she has been having a hard time coping with it.  Interview limited due to patient being uncooperative with interview.    With patient's permission, spoke with her , Yonatan Rdz ([phone]), who reports that patient has been more depressed and anxious, states since Sunday stopped eating and taking her medications, "and just gave up."  Reports patient has no h/o SA or SIB.  Confirms that patient's friend had recently passed away, however states that he believes that current symptoms started prior to friend passing away.  Reports patient often states "I want to die."  Reports that patient is not even looking forward to family event (several of her grandchildren are getting  later this year).  States that he notes patient to be more confused compared to baseline, states at baseline is able to state where she is and what the current date is.  Denies having concerns about patient's safety, does not believe patient would intentionally harm herself.  Feels patient would benefit from an antidepressant medication as patient has never been on medications for depression. 79 y/o   female, domiciled with  and HHA, with no significant PPHx, no substance abuse hx, no prior psychiatric admissions, no prior SA's, with PMHx of HTN and pyoderma w/ gluteal wounds b/l, h/o DVT/PE, total knee replacement c/b joint infections p/w confusion, presenting with confusion and chills at home associated with poor appetite and confusion x 1 week and not eating x 2 days, found to have cellulitis of right leg, admitted to hospital for IV Abx.  Psychiatry consulted to assess for anxiety and suicidality.    Patient seen and evaluated today, awake and alert, however not cooperative with interview, states "I'm in pain, just go away, go home", states not wanting to speak to writer.  When asked about orientation, unable to state where she is or her name, closes her eyes following being asked, needing multiple attempts and prompts to answer question, however refuses to answer.  When asked about suicidality and if she is currently feeling suicidal, denies.  When asked if she has ever been suicidal in the past, denies.  States that she feels depressed and anxious, speaks of a close family friend who recently passed away, states that she has been having a hard time coping with it.  Interview limited due to patient being uncooperative with interview.    With patient's permission, spoke with her , Yonatan Rdz ([phone]), who reports that patient has been more depressed and anxious, states since Sunday stopped eating and taking her medications, "and just gave up."  Reports patient has no h/o SA or SIB.  Confirms that patient's friend had recently passed away, however states that he believes that current symptoms started prior to friend passing away.  Reports patient often states "I want to die."  Reports that patient is not even looking forward to family event (several of her grandchildren are getting  later this year).  States that he notes patient to be more confused compared to baseline, states at baseline is able to state where she is and what the current date is.  Denies having concerns about patient's safety, does not believe patient would intentionally harm herself.  Reports patient's sleep wake cycle is impaired, reports often sleeping during the day, reports poor appetite while in the hospital;.  Feels patient would benefit from an antidepressant medication as patient has never been on medications for depression.

## 2019-02-14 NOTE — BEHAVIORAL HEALTH ASSESSMENT NOTE - NSBHCHARTREVIEWVS_PSY_A_CORE FT
Vital Signs Last 24 Hrs  T(C): 36.7 (14 Feb 2019 07:35), Max: 37.7 (13 Feb 2019 15:37)  T(F): 98 (14 Feb 2019 07:35), Max: 99.8 (13 Feb 2019 15:37)  HR: 90 (14 Feb 2019 07:35) (89 - 99)  BP: 116/70 (14 Feb 2019 07:35) (114/70 - 122/73)  BP(mean): --  RR: 20 (14 Feb 2019 07:35) (20 - 21)  SpO2: 95% (14 Feb 2019 07:35) (94% - 96%)

## 2019-02-15 DIAGNOSIS — L88 PYODERMA GANGRENOSUM: ICD-10-CM

## 2019-02-15 LAB
-  AMPICILLIN/SULBACTAM: SIGNIFICANT CHANGE UP
-  CEFAZOLIN: SIGNIFICANT CHANGE UP
-  CLINDAMYCIN: SIGNIFICANT CHANGE UP
-  ERYTHROMYCIN: SIGNIFICANT CHANGE UP
-  GENTAMICIN: SIGNIFICANT CHANGE UP
-  OXACILLIN: SIGNIFICANT CHANGE UP
-  PENICILLIN: SIGNIFICANT CHANGE UP
-  RIFAMPIN: SIGNIFICANT CHANGE UP
-  TETRACYCLINE: SIGNIFICANT CHANGE UP
-  TRIMETHOPRIM/SULFAMETHOXAZOLE: SIGNIFICANT CHANGE UP
-  VANCOMYCIN: SIGNIFICANT CHANGE UP
ANION GAP SERPL CALC-SCNC: 12 MMOL/L — SIGNIFICANT CHANGE UP (ref 5–17)
BUN SERPL-MCNC: 22 MG/DL — SIGNIFICANT CHANGE UP (ref 7–23)
CALCIUM SERPL-MCNC: 9 MG/DL — SIGNIFICANT CHANGE UP (ref 8.4–10.5)
CHLORIDE SERPL-SCNC: 108 MMOL/L — SIGNIFICANT CHANGE UP (ref 96–108)
CO2 SERPL-SCNC: 22 MMOL/L — SIGNIFICANT CHANGE UP (ref 22–31)
CREAT SERPL-MCNC: 1.16 MG/DL — SIGNIFICANT CHANGE UP (ref 0.5–1.3)
CULTURE RESULTS: SIGNIFICANT CHANGE UP
CULTURE RESULTS: SIGNIFICANT CHANGE UP
FOLATE SERPL-MCNC: >20 NG/ML — SIGNIFICANT CHANGE UP
GLUCOSE SERPL-MCNC: 117 MG/DL — HIGH (ref 70–99)
METHOD TYPE: SIGNIFICANT CHANGE UP
ORGANISM # SPEC MICROSCOPIC CNT: SIGNIFICANT CHANGE UP
POTASSIUM SERPL-MCNC: 4.3 MMOL/L — SIGNIFICANT CHANGE UP (ref 3.5–5.3)
POTASSIUM SERPL-SCNC: 4.3 MMOL/L — SIGNIFICANT CHANGE UP (ref 3.5–5.3)
SODIUM SERPL-SCNC: 142 MMOL/L — SIGNIFICANT CHANGE UP (ref 135–145)
SPECIMEN SOURCE: SIGNIFICANT CHANGE UP
SPECIMEN SOURCE: SIGNIFICANT CHANGE UP
TSH SERPL-MCNC: 1.94 UIU/ML — SIGNIFICANT CHANGE UP (ref 0.27–4.2)
VIT B12 SERPL-MCNC: >2000 PG/ML — HIGH (ref 232–1245)

## 2019-02-15 PROCEDURE — 99223 1ST HOSP IP/OBS HIGH 75: CPT | Mod: GC

## 2019-02-15 PROCEDURE — 99232 SBSQ HOSP IP/OBS MODERATE 35: CPT

## 2019-02-15 RX ORDER — MORPHINE SULFATE 50 MG/1
2 CAPSULE, EXTENDED RELEASE ORAL ONCE
Qty: 0 | Refills: 0 | Status: DISCONTINUED | OUTPATIENT
Start: 2019-02-15 | End: 2019-02-15

## 2019-02-15 RX ADMIN — Medication 1 MILLIGRAM(S): at 12:24

## 2019-02-15 RX ADMIN — MORPHINE SULFATE 2 MILLIGRAM(S): 50 CAPSULE, EXTENDED RELEASE ORAL at 22:49

## 2019-02-15 RX ADMIN — Medication 250 MILLIGRAM(S): at 12:22

## 2019-02-15 RX ADMIN — OXYCODONE AND ACETAMINOPHEN 1 TABLET(S): 5; 325 TABLET ORAL at 19:59

## 2019-02-15 RX ADMIN — Medication 1 TABLET(S): at 05:34

## 2019-02-15 RX ADMIN — Medication 5 MILLIGRAM(S): at 22:20

## 2019-02-15 RX ADMIN — HEPARIN SODIUM 5000 UNIT(S): 5000 INJECTION INTRAVENOUS; SUBCUTANEOUS at 14:45

## 2019-02-15 RX ADMIN — Medication 1 TABLET(S): at 12:24

## 2019-02-15 RX ADMIN — POLYETHYLENE GLYCOL 3350 17 GRAM(S): 17 POWDER, FOR SOLUTION ORAL at 12:24

## 2019-02-15 RX ADMIN — HEPARIN SODIUM 5000 UNIT(S): 5000 INJECTION INTRAVENOUS; SUBCUTANEOUS at 22:20

## 2019-02-15 RX ADMIN — Medication 5 MILLIGRAM(S): at 05:34

## 2019-02-15 RX ADMIN — OXYCODONE AND ACETAMINOPHEN 1 TABLET(S): 5; 325 TABLET ORAL at 14:45

## 2019-02-15 RX ADMIN — OXYCODONE AND ACETAMINOPHEN 1 TABLET(S): 5; 325 TABLET ORAL at 17:45

## 2019-02-15 RX ADMIN — HEPARIN SODIUM 5000 UNIT(S): 5000 INJECTION INTRAVENOUS; SUBCUTANEOUS at 05:33

## 2019-02-15 RX ADMIN — Medication 1 TABLET(S): at 22:20

## 2019-02-15 RX ADMIN — MORPHINE SULFATE 2 MILLIGRAM(S): 50 CAPSULE, EXTENDED RELEASE ORAL at 22:19

## 2019-02-15 RX ADMIN — Medication 500 MILLIGRAM(S): at 12:24

## 2019-02-15 RX ADMIN — ESCITALOPRAM OXALATE 5 MILLIGRAM(S): 10 TABLET, FILM COATED ORAL at 12:24

## 2019-02-15 RX ADMIN — AMLODIPINE BESYLATE 10 MILLIGRAM(S): 2.5 TABLET ORAL at 05:34

## 2019-02-15 RX ADMIN — OXYCODONE AND ACETAMINOPHEN 1 TABLET(S): 5; 325 TABLET ORAL at 21:00

## 2019-02-15 RX ADMIN — Medication 3 MILLIGRAM(S): at 22:20

## 2019-02-15 RX ADMIN — Medication 81 MILLIGRAM(S): at 12:24

## 2019-02-15 RX ADMIN — Medication 25 MILLIGRAM(S): at 05:34

## 2019-02-15 RX ADMIN — Medication 1000 UNIT(S): at 12:24

## 2019-02-15 RX ADMIN — GABAPENTIN 300 MILLIGRAM(S): 400 CAPSULE ORAL at 17:50

## 2019-02-15 RX ADMIN — ATORVASTATIN CALCIUM 10 MILLIGRAM(S): 80 TABLET, FILM COATED ORAL at 22:19

## 2019-02-15 RX ADMIN — SODIUM CHLORIDE 90 MILLILITER(S): 9 INJECTION INTRAMUSCULAR; INTRAVENOUS; SUBCUTANEOUS at 19:59

## 2019-02-15 RX ADMIN — Medication 1 TABLET(S): at 14:45

## 2019-02-15 RX ADMIN — GABAPENTIN 300 MILLIGRAM(S): 400 CAPSULE ORAL at 05:34

## 2019-02-15 RX ADMIN — PANTOPRAZOLE SODIUM 40 MILLIGRAM(S): 20 TABLET, DELAYED RELEASE ORAL at 08:46

## 2019-02-15 NOTE — PROGRESS NOTE BEHAVIORAL HEALTH - CASE SUMMARY
This is an 78-y.o. CF pt. domiciled with  and HHA, with no significant PPHx, no substance abuse hx, no prior psychiatric admissions, no prior SA's, with PMHx of HTN and pyoderma w/ gluteal wounds b/l, h/o DVT/PE, total knee replacement c/b joint infections p/w confusion, presenting with confusion and chills at home associated with poor appetite and confusion x 1 week and not eating x 2 days, found to have cellulitis of right leg, admitted to hospital for IV Abx.  Psychiatry consulted to assess for anxiety and suicidality.    I have seen and evaluated this patient myself. Chart, labs, meds reviewed. I agree with NP's assessment and plan.

## 2019-02-15 NOTE — CONSULT NOTE ADULT - ASSESSMENT
78 F PMH of CAD s/p stent to LAD, AS s/p TAVR, PPM, DVT (Jan '18) on coumadin, IVC filter, pyoderma gangrenosum, hx MRSA infections, and total knee replacement c/b joint infections s/p I&D explantation and spacer placement and removal recent admission in November 2018 for hip and thigh wounds presenting with AMS and chills. Her picture is suggestive of infection with fever and leukocytosis although exam is somewhat non focal. She also has been treated for appendicitis in the past year and PICC line related coag negative staph bacteremia. Patient's PG's have  significantly improved in size and depth while on cyclosporine and since last visit, case discussed with Dr. Salas.       PG, significantly improved since last hospitalization in November  -agree with holding cyclosporine during management of acute illness  -recommend f/u with Dr. Salas within 1-2 weeks after discharge to restart cyclosporine  -can consider restarting cyclosporine upon discharge in conjunction with Dr. Salsa  -will defer to wound care team for wound management given they are managing patient as outpatient with significant improvement  -c/w management of bacteremia per primary team and ID, etiology unclear      Patient staffed with Dr. Britt    Please page [phone] with questions.    Malou Dykes MD PGY-2  [facility] Dermatology  Pager: [phone]  Office: [phone]

## 2019-02-15 NOTE — PROGRESS NOTE ADULT - SUBJECTIVE AND OBJECTIVE BOX
Patient is a 78y old  Female who presents with a chief complaint of Cellulitis (15 Feb 2019 08:45)      SUBJECTIVE / OVERNIGHT EVENTS:  feels a lot better.  awake alert  the aide at bedside.  no cp, no sob, no n/v/d. no abdominal pain.  no headache, no dizziness.   not eating much yet      Vital Signs Last 24 Hrs  T(C): 37.7 (15 Feb 2019 05:44), Max: 37.7 (15 Feb 2019 05:44)  T(F): 99.8 (15 Feb 2019 05:44), Max: 99.8 (15 Feb 2019 05:44)  HR: 94 (15 Feb 2019 05:44) (90 - 94)  BP: 158/72 (15 Feb 2019 05:44) (123/77 - 158/72)  BP(mean): --  RR: 20 (15 Feb 2019 05:44) (20 - 20)  SpO2: 92% (15 Feb 2019 05:44) (92% - 96%)  I&O's Summary    14 Feb 2019 07:01  -  15 Feb 2019 07:00  --------------------------------------------------------  IN: 1090 mL / OUT: 400 mL / NET: 690 mL        PHYSICAL EXAM:  GENERAL: NAD, Comfortable, obese lady, lying in bed  HEAD:  Atraumatic, Normocephalic  EYES: EOMI, PERRLA, conjunctiva and sclera clear  NECK: Supple, No JVD  CHEST/LUNG: mild decrease breath sounds bilaterally; No wheeze   HEART: Regular rate and rhythm; No murmurs, rubs, or gallops  ABDOMEN: Soft, Nontender, Nondistended; Bowel sounds present  Neuro: AAO x 2, no focal deficit  EXTREMITIES:  2+ Peripheral Pulses, No clubbing, cyanosis. stable nonpitting edema UE and LE  Back: sacral and thigh wound. +wound vac  SKIN: No rashes or lesions    LABS:                        8.2    10.1  )-----------( 200      ( 15 Feb 2019 09:37 )             25.6     02-15    142  |  108  |  22  ----------------------------<  117<H>  4.3   |  22  |  1.16    Ca    9.0      15 Feb 2019 09:33        CAPILLARY BLOOD GLUCOSE                RADIOLOGY & ADDITIONAL TESTS:    Imaging Personally Reviewed:  [x] YES  [ ] NO    Consultant(s) Notes Reviewed:  [x] YES  [ ] NO      MEDICATIONS  (STANDING):  amLODIPine   Tablet 10 milliGRAM(s) Oral daily  ascorbic acid 500 milliGRAM(s) Oral daily  aspirin enteric coated 81 milliGRAM(s) Oral daily  atorvastatin 10 milliGRAM(s) Oral at bedtime  bisacodyl 5 milliGRAM(s) Oral at bedtime  calcium carbonate 1250 mG  + Vitamin D (OsCal 500 + D) 1 Tablet(s) Oral three times a day  cholecalciferol 1000 Unit(s) Oral daily  escitalopram 5 milliGRAM(s) Oral daily  folic acid 1 milliGRAM(s) Oral daily  gabapentin 300 milliGRAM(s) Oral two times a day  heparin  Injectable 5000 Unit(s) SubCutaneous every 8 hours  melatonin 3 milliGRAM(s) Oral at bedtime  metoprolol succinate ER 25 milliGRAM(s) Oral daily  pantoprazole    Tablet 40 milliGRAM(s) Oral before breakfast  polyethylene glycol 3350 17 Gram(s) Oral daily  predniSONE   Tablet 5 milliGRAM(s) Oral daily  sodium chloride 0.9%. 1000 milliLiter(s) (90 mL/Hr) IV Continuous <Continuous>  vancomycin  IVPB 1000 milliGRAM(s) IV Intermittent every 24 hours  vancomycin  IVPB      vitamin B complex with vitamin C 1 Tablet(s) Oral daily    MEDICATIONS  (PRN):  acetaminophen   Tablet .. 650 milliGRAM(s) Oral every 6 hours PRN Mild Pain (1 - 3)  oxyCODONE    5 mG/acetaminophen 325 mG 1 Tablet(s) Oral every 6 hours PRN Severe Pain (7 - 10)      Care Discussed with Consultants/Other Providers [x] YES  [ ] NO    HEALTH ISSUES - PROBLEM Dx:  Depression, unspecified depression type: Depression, unspecified depression type  Medication management: Medication management  Gastroesophageal reflux disease, esophagitis presence not specified: Gastroesophageal reflux disease, esophagitis presence not specified  Aortic stenosis, severe: Aortic stenosis, severe  Coronary artery disease involving native coronary artery of native heart without angina pectoris: Coronary artery disease involving native coronary artery of native heart without angina pectoris  Altered mental status, unspecified altered mental status type: Altered mental status, unspecified altered mental status type  Cellulitis of leg, right: Cellulitis of leg, right

## 2019-02-15 NOTE — PROGRESS NOTE ADULT - SUBJECTIVE AND OBJECTIVE BOX
CC: f/u for coag negative staph bacteremia    Patient reports: she is sleepy this AM and does not want to be engaged.    REVIEW OF SYSTEMS:  All other review of systems negative (Comprehensive ROS): fever has moderated, still low grade    Antimicrobials Day #  :day 3  vancomycin  IVPB 1000 milliGRAM(s) IV Intermittent every 24 hours  vancomycin  IVPB        Other Medications Reviewed    T(F): 99.8 (02-15-19 @ 05:44), Max: 99.8 (02-15-19 @ 05:44)  HR: 94 (02-15-19 @ 05:44)  BP: 158/72 (02-15-19 @ 05:44)  RR: 20 (02-15-19 @ 05:44)  SpO2: 92% (02-15-19 @ 05:44)  Wt(kg): --    PHYSICAL EXAM:  General: lethargic, no acute distress, appropriate  Eyes:  anicteric, no conjunctival injection, no discharge  Oropharynx: no lesions or injection 	  Neck: supple, without adenopathy  Lungs: clear to auscultation  Heart: regular rate and rhythm; distant tones  Abdomen: soft, nondistended, nontender, without mass or organomegaly  Skin: healed rt knee and abdominal wall lesions, wound vac on rt flank.drug induced hyperpigmentation of legs  Extremities: no clubbing, cyanosis, trace edema  Neurologic: alert, oriented, moves all extremities  wounds clean per wound care note.  LAB RESULTS:                        8.9    13.0  )-----------( 248      ( 14 Feb 2019 09:53 )             27.2     02-14    143  |  106  |  22  ----------------------------<  94  4.5   |  20<L>  |  1.16    Ca    9.3      14 Feb 2019 09:33      vanco trough 9.2    MICROBIOLOGY:  RECENT CULTURES:  02-12 @ 17:38 .Urine Clean Catch (Midstream)     No growth      02-12 @ 15:55 .Blood Blood-Peripheral Blood Culture PCR    Growth in aerobic and anaerobic bottles: Staphylococcus epidermidis      Growth in aerobic bottle: Gram Positive Cocci in Clusters  Growth in anaerobic bottle: Gram Positive Cocci in Clusters        RADIOLOGY REVIEWED:    < from: Xray Chest 1 View- PORTABLE-Urgent (02.12.19 @ 12:33) >    INTERPRETATION:  A single chest x-ray was obtained on February 12, 2019.    Indication: Shortness of breath.    Impression:    The heart is enlarged. Elevation right hemidiaphragm. The lungs appear to   be clear. No radiographic evidence for congestive heart failure. A pacer   is in good position. No change in the appearance of the chest when   compared to previous study done November 12, 2018.    < end of copied text >

## 2019-02-15 NOTE — PROGRESS NOTE ADULT - ASSESSMENT
77 yo female with history of PMR, Pyoderma Gangrenosum, s/p TAVR and explant of infected RT Knee for MRSA ,now admitted with fever to 102 and leukocytosis.  She is on a steroid taper and cyclosporine for PG and is supposed to be taking doxy for MRSA suppression.  Her picture is suggestive of infection with fever and leukocytosis although exam is somewhat non focal.  By report her wounds have been improving.She also has been treated for appendicitis in the past year and PICC line related coag negative staph bacteremia.  She may be colonized with resistant roopa.  Her urine isn sterile and wound care note confirms lack of wound infection.  Hence, I agree with DR Mace that coag negative staph bacteremia raises concerns of either heart valve infection or PPM infection.  Suggest:  1.await repeat blood cultures , sent 2/14 aftter vanco started  2.Continue vanco 1 gram daily, will check trough next week  3.Await sensitivities of isolate  4.Suggest ERIKA and will consider addition of rifampin  5.Follow wbc, temps and exam on this regimen.. 77 yo female with history of PMR, Pyoderma Gangrenosum, s/p TAVR and explant of infected RT Knee for MRSA ,now admitted with fever to 102 and leukocytosis.  She is on a steroid taper and cyclosporine for PG and is supposed to be taking doxy for MRSA suppression.  Her picture is suggestive of infection with fever and leukocytosis although exam is somewhat non focal.  By report her wounds have been improving.She also has been treated for appendicitis in the past year and PICC line related coag negative staph bacteremia.  She may be colonized with resistant roopa.  Her urine isn sterile and wound care note confirms lack of wound infection.Her knee spacer appears benign in appearance, not the source of bacteremia.  Hence, I agree with DR Mace that coag negative staph bacteremia raises concerns of either heart valve infection or PPM infection.  Suggest:  1.await repeat blood cultures , sent 2/14 aftter vanco started  2.Continue vanco 1 gram daily, will check trough next week  3.Await sensitivities of isolate  4.Suggest ERIKA and will consider addition of rifampin  5.Follow wbc, temps and exam on this regimen..

## 2019-02-15 NOTE — PROGRESS NOTE ADULT - ATTENDING COMMENTS
- Dr. DENISSE Palumbo ([facility] [place])  - [phone]

## 2019-02-15 NOTE — CONSULT NOTE ADULT - SUBJECTIVE AND OBJECTIVE BOX
HPI:  78F PMH of CAD s/p stent to LAD, AS s/p TAVR, PPM, DVT (Jan '18) on coumadin, IVC filter, pyoderma gangrenosum, hx MRSA infections, and total knee replacement c/b joint infections s/p I&D explantation and spacer placement followed by spacer removal and static spacer placement with joint fusion due to infection of initial spacer with complex wound closure by plastic surgery, admission here in November 2018 for hip and thigh wounds, now presenting with confusion and chills at home associated with poor appetite and confusion x 1 week and not eating x 2 days. Patient confused per aide, not remembering things she normally would. Has refused to eat food for two days citing poor appetite and has had poor mood though no suicidality. Patient with chronic pains of wounds of her hip/thigh as well as chronic R knee pain due to surgeries as above. There is some erythema surrounding the right knee that aide believes is somewhat worse than baseline over last 2-3 days but no exudates. Other wounds have been healing well per aide and she has wound vac on R hip and dressings in place on left thigh/glute. Patient has however also complained of chills at home. Denies any chest pains at this time, has not complained of this at home per aide. 	 Denies SOB at this time. (12 Feb 2019 23:38)        Dermatology consulted to evaluate patient's pyoderma gangrenosum. Patient last seen by dermatology consult service in November but has been evaluated multiple times over the course of multiple hospitalizations. Patient's ulcers are most consistent with pyoderma gangrenosum. At last visit, patient was noted to have retiform purpura concerning for a vasculopathy however w/o was negative. Since being discharged pat has been on a prednisone taper and cyclosporine managed by Dr. Salas. Upon speaking with Dr. Salas, she was last seen in January and started on cyclosporine 200 mg twice daily. Patient also following with wound care and has been having significant improvement in ulcer. Since November, patient has been admitted to [place] and had debridement. Most recently patient was admitted to [place] one week ago where cyclosporine was stopped. Overall patient's aide notes she has had significant improvement in the ulcerations.     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  Artificial cardiac pacemaker: 12/25/17  H/O cardiac catheterization: 11/29/16 HERBERT placed on LAD  H/O endoscopy: with dilatation of esophageal stricture 2013  S/P cataract surgery: x2; 3-4yr ago  S/P gastroplasty: 28 yrs ago  S/P cholecystectomy: more than 15 years ago  S/P total knee replacement: left, 15yr ago  S/P total knee replacement: right, 12yr ago  S/P hysterectomy: 24yr ago      REVIEW OF SYSTEMS      General: no fevers/chills, no lethary	    Skin/Breast: see HPI  	  Ophthalmologic: no eye pain or change in vision  	  ENMT: no dysphagia or change in hearing    Respiratory and Thorax: no SOB or cough  	  Cardiovascular: no palpitations or chest pain    Gastrointestinal: no abdomenal pain or blood in stool     Genitourinary: no dysuria or frequency    Musculoskeletal: no joint pains or weakness	    Neurological:no weakness, numbness , or tingling    MEDICATIONS  (STANDING):  amLODIPine   Tablet 10 milliGRAM(s) Oral daily  ascorbic acid 500 milliGRAM(s) Oral daily  aspirin enteric coated 81 milliGRAM(s) Oral daily  atorvastatin 10 milliGRAM(s) Oral at bedtime  bisacodyl 5 milliGRAM(s) Oral at bedtime  calcium carbonate 1250 mG  + Vitamin D (OsCal 500 + D) 1 Tablet(s) Oral three times a day  cholecalciferol 1000 Unit(s) Oral daily  escitalopram 5 milliGRAM(s) Oral daily  folic acid 1 milliGRAM(s) Oral daily  gabapentin 300 milliGRAM(s) Oral two times a day  heparin  Injectable 5000 Unit(s) SubCutaneous every 8 hours  melatonin 3 milliGRAM(s) Oral at bedtime  metoprolol succinate ER 25 milliGRAM(s) Oral daily  pantoprazole    Tablet 40 milliGRAM(s) Oral before breakfast  polyethylene glycol 3350 17 Gram(s) Oral daily  predniSONE   Tablet 5 milliGRAM(s) Oral daily  sodium chloride 0.9%. 1000 milliLiter(s) (90 mL/Hr) IV Continuous <Continuous>  vancomycin  IVPB 1000 milliGRAM(s) IV Intermittent every 24 hours  vancomycin  IVPB      vitamin B complex with vitamin C 1 Tablet(s) Oral daily    MEDICATIONS  (PRN):  acetaminophen   Tablet .. 650 milliGRAM(s) Oral every 6 hours PRN Mild Pain (1 - 3)  oxyCODONE    5 mG/acetaminophen 325 mG 1 Tablet(s) Oral every 6 hours PRN Severe Pain (7 - 10)      Allergies    amoxicillin (Other)    Intolerances    IV Contrast (Flushing (Skin); Nausea)      SOCIAL HISTORY:    FAMILY HISTORY:  No pertinent family history in first degree relatives      Vital Signs Last 24 Hrs  T(C): 37.7 (15 Feb 2019 05:44), Max: 37.7 (15 Feb 2019 05:44)  T(F): 99.8 (15 Feb 2019 05:44), Max: 99.8 (15 Feb 2019 05:44)  HR: 94 (15 Feb 2019 05:44) (90 - 94)  BP: 158/72 (15 Feb 2019 05:44) (123/77 - 158/72)  BP(mean): --  RR: 20 (15 Feb 2019 05:44) (20 - 20)  SpO2: 92% (15 Feb 2019 05:44) (92% - 96%)    PHYSICAL EXAM:     The patient was alert and oriented X 3, well nourished, and in no  apparent distress.  OP showed no ulcerations  There was no visible lymphadenopathy.  Conjunctiva were non injected  There was no clubbing or edema of extremities.  The scalp, hair, face, eyebrows, lips, OP, neck, chest, back,   extremities X 4, nails were examined.  There was no hyperhidrosis or bromhidrosis.    Of note on skin exam:       LABS:                        8.2    10.1  )-----------( 200      ( 15 Feb 2019 09:37 )             25.6     02-15    142  |  108  |  22  ----------------------------<  117<H>  4.3   |  22  |  1.16    Ca    9.0      15 Feb 2019 09:33            RADIOLOGY & ADDITIONAL STUDIES: HPI:  78F PMH of CAD s/p stent to LAD, AS s/p TAVR, PPM, DVT (Jan '18) on coumadin, IVC filter, pyoderma gangrenosum, hx MRSA infections, and total knee replacement c/b joint infections s/p I&D explantation and spacer placement followed by spacer removal and static spacer placement with joint fusion due to infection of initial spacer with complex wound closure by plastic surgery, admission here in November 2018 for hip and thigh wounds, now presenting with confusion and chills at home associated with poor appetite and confusion x 1 week and not eating x 2 days. Patient confused per aide, not remembering things she normally would. Has refused to eat food for two days citing poor appetite and has had poor mood though no suicidality. Patient with chronic pains of wounds of her hip/thigh as well as chronic R knee pain due to surgeries as above. There is some erythema surrounding the right knee that aide believes is somewhat worse than baseline over last 2-3 days but no exudates. Other wounds have been healing well per aide and she has wound vac on R hip and dressings in place on left thigh/glute. Patient has however also complained of chills at home. Denies any chest pains at this time, has not complained of this at home per aide. 	 Denies SOB at this time. (12 Feb 2019 23:38)        Dermatology consulted to evaluate patient's pyoderma gangrenosum. Patient last seen by dermatology consult service in November but has been evaluated multiple times over the course of multiple hospitalizations. Patient's ulcers are most consistent with pyoderma gangrenosum. At last visit, patient was noted to have retiform purpura concerning for a vasculopathy however w/o was negative. Since being discharged pat has been on a prednisone taper and cyclosporine managed by Dr. Salas. Upon speaking with Dr. Salas, she was last seen in January and started on cyclosporine 200 mg twice daily. Patient also following with wound care and has been having significant improvement in ulcer. Since November, patient has been admitted to [place] and had debridement. Most recently patient was admitted to [place] one week ago where cyclosporine was stopped. Overall patient's aide notes she has had significant improvement in the ulcerations.     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  Artificial cardiac pacemaker: 12/25/17  H/O cardiac catheterization: 11/29/16 HERBERT placed on LAD  H/O endoscopy: with dilatation of esophageal stricture 2013  S/P cataract surgery: x2; 3-4yr ago  S/P gastroplasty: 28 yrs ago  S/P cholecystectomy: more than 15 years ago  S/P total knee replacement: left, 15yr ago  S/P total knee replacement: right, 12yr ago  S/P hysterectomy: 24yr ago      REVIEW OF SYSTEMS      General: no fevers/chills, no lethargy	    Skin/Breast: see HPI  	  Ophthalmologic: no eye pain or change in vision  	  ENMT: no dysphagia or change in hearing    Respiratory and Thorax: no SOB or cough  	  Cardiovascular: no palpitations or chest pain    Gastrointestinal: no abdominal pain or blood in stool     Genitourinary: no dysuria or frequency    Musculoskeletal: no joint pains or weakness	    Neurological:no weakness, numbness , or tingling    MEDICATIONS  (STANDING):  amLODIPine   Tablet 10 milliGRAM(s) Oral daily  ascorbic acid 500 milliGRAM(s) Oral daily  aspirin enteric coated 81 milliGRAM(s) Oral daily  atorvastatin 10 milliGRAM(s) Oral at bedtime  bisacodyl 5 milliGRAM(s) Oral at bedtime  calcium carbonate 1250 mG  + Vitamin D (OsCal 500 + D) 1 Tablet(s) Oral three times a day  cholecalciferol 1000 Unit(s) Oral daily  escitalopram 5 milliGRAM(s) Oral daily  folic acid 1 milliGRAM(s) Oral daily  gabapentin 300 milliGRAM(s) Oral two times a day  heparin  Injectable 5000 Unit(s) SubCutaneous every 8 hours  melatonin 3 milliGRAM(s) Oral at bedtime  metoprolol succinate ER 25 milliGRAM(s) Oral daily  pantoprazole    Tablet 40 milliGRAM(s) Oral before breakfast  polyethylene glycol 3350 17 Gram(s) Oral daily  predniSONE   Tablet 5 milliGRAM(s) Oral daily  sodium chloride 0.9%. 1000 milliLiter(s) (90 mL/Hr) IV Continuous <Continuous>  vancomycin  IVPB 1000 milliGRAM(s) IV Intermittent every 24 hours  vancomycin  IVPB      vitamin B complex with vitamin C 1 Tablet(s) Oral daily    MEDICATIONS  (PRN):  acetaminophen   Tablet .. 650 milliGRAM(s) Oral every 6 hours PRN Mild Pain (1 - 3)  oxyCODONE    5 mG/acetaminophen 325 mG 1 Tablet(s) Oral every 6 hours PRN Severe Pain (7 - 10)      Allergies    amoxicillin (Other)    Intolerances    IV Contrast (Flushing (Skin); Nausea)      SOCIAL HISTORY:    FAMILY HISTORY:  No pertinent family history in first degree relatives      Vital Signs Last 24 Hrs  T(C): 37.7 (15 Feb 2019 05:44), Max: 37.7 (15 Feb 2019 05:44)  T(F): 99.8 (15 Feb 2019 05:44), Max: 99.8 (15 Feb 2019 05:44)  HR: 94 (15 Feb 2019 05:44) (90 - 94)  BP: 158/72 (15 Feb 2019 05:44) (123/77 - 158/72)  BP(mean): --  RR: 20 (15 Feb 2019 05:44) (20 - 20)  SpO2: 92% (15 Feb 2019 05:44) (92% - 96%)    PHYSICAL EXAM:     The patient was alert and oriented X 3, well nourished, and in no  apparent distress.  OP showed no ulcerations  There was no visible lymphadenopathy.  Conjunctiva were non injected  There was no clubbing or edema of extremities.  The scalp, hair, face, eyebrows, lips, OP, neck, chest, back,   extremities X 4, nails were examined.  There was no hyperhidrosis or bromhidrosis.    Of note on skin exam:     right leg ulcer with VAC intact  ulcer on left hip - 2 lesions. anterior lesion more superficial with minimal purulent discharge ~ 9 x 3 cm , deeper posterior ulcer 6 x 4 cm with surrounding ecchymosis  left lateral proximal leg with three ulcers all ~1.5 x 1.5 cm, one deeper, two others more superficial     LABS:                        8.2    10.1  )-----------( 200      ( 15 Feb 2019 09:37 )             25.6     02-15    142  |  108  |  22  ----------------------------<  117<H>  4.3   |  22  |  1.16    Ca    9.0      15 Feb 2019 09:33            RADIOLOGY & ADDITIONAL STUDIES:

## 2019-02-16 LAB
ANION GAP SERPL CALC-SCNC: 14 MMOL/L — SIGNIFICANT CHANGE UP (ref 5–17)
BUN SERPL-MCNC: 24 MG/DL — HIGH (ref 7–23)
CALCIUM SERPL-MCNC: 9 MG/DL — SIGNIFICANT CHANGE UP (ref 8.4–10.5)
CHLORIDE SERPL-SCNC: 108 MMOL/L — SIGNIFICANT CHANGE UP (ref 96–108)
CO2 SERPL-SCNC: 19 MMOL/L — LOW (ref 22–31)
CREAT SERPL-MCNC: 1.29 MG/DL — SIGNIFICANT CHANGE UP (ref 0.5–1.3)
GLUCOSE SERPL-MCNC: 108 MG/DL — HIGH (ref 70–99)
HCT VFR BLD CALC: 27.9 % — LOW (ref 34.5–45)
HGB BLD-MCNC: 8.1 G/DL — LOW (ref 11.5–15.5)
MCHC RBC-ENTMCNC: 27.7 PG — SIGNIFICANT CHANGE UP (ref 27–34)
MCHC RBC-ENTMCNC: 29 GM/DL — LOW (ref 32–36)
MCV RBC AUTO: 95.5 FL — SIGNIFICANT CHANGE UP (ref 80–100)
PLATELET # BLD AUTO: 237 K/UL — SIGNIFICANT CHANGE UP (ref 150–400)
POTASSIUM SERPL-MCNC: 4.6 MMOL/L — SIGNIFICANT CHANGE UP (ref 3.5–5.3)
POTASSIUM SERPL-SCNC: 4.6 MMOL/L — SIGNIFICANT CHANGE UP (ref 3.5–5.3)
RBC # BLD: 2.92 M/UL — LOW (ref 3.8–5.2)
RBC # FLD: 17.5 % — HIGH (ref 10.3–14.5)
SODIUM SERPL-SCNC: 141 MMOL/L — SIGNIFICANT CHANGE UP (ref 135–145)
VANCOMYCIN TROUGH SERPL-MCNC: 18.9 UG/ML — SIGNIFICANT CHANGE UP (ref 10–20)
WBC # BLD: 10.04 K/UL — SIGNIFICANT CHANGE UP (ref 3.8–10.5)
WBC # FLD AUTO: 10.04 K/UL — SIGNIFICANT CHANGE UP (ref 3.8–10.5)

## 2019-02-16 RX ADMIN — Medication 1 TABLET(S): at 12:06

## 2019-02-16 RX ADMIN — Medication 1 TABLET(S): at 13:17

## 2019-02-16 RX ADMIN — ESCITALOPRAM OXALATE 5 MILLIGRAM(S): 10 TABLET, FILM COATED ORAL at 12:06

## 2019-02-16 RX ADMIN — ATORVASTATIN CALCIUM 10 MILLIGRAM(S): 80 TABLET, FILM COATED ORAL at 21:59

## 2019-02-16 RX ADMIN — Medication 1 TABLET(S): at 21:58

## 2019-02-16 RX ADMIN — Medication 81 MILLIGRAM(S): at 12:05

## 2019-02-16 RX ADMIN — GABAPENTIN 300 MILLIGRAM(S): 400 CAPSULE ORAL at 18:07

## 2019-02-16 RX ADMIN — OXYCODONE AND ACETAMINOPHEN 1 TABLET(S): 5; 325 TABLET ORAL at 14:35

## 2019-02-16 RX ADMIN — OXYCODONE AND ACETAMINOPHEN 1 TABLET(S): 5; 325 TABLET ORAL at 13:24

## 2019-02-16 RX ADMIN — Medication 5 MILLIGRAM(S): at 06:38

## 2019-02-16 RX ADMIN — Medication 25 MILLIGRAM(S): at 06:38

## 2019-02-16 RX ADMIN — HEPARIN SODIUM 5000 UNIT(S): 5000 INJECTION INTRAVENOUS; SUBCUTANEOUS at 13:17

## 2019-02-16 RX ADMIN — HEPARIN SODIUM 5000 UNIT(S): 5000 INJECTION INTRAVENOUS; SUBCUTANEOUS at 06:39

## 2019-02-16 RX ADMIN — Medication 3 MILLIGRAM(S): at 21:59

## 2019-02-16 RX ADMIN — Medication 250 MILLIGRAM(S): at 11:52

## 2019-02-16 RX ADMIN — HEPARIN SODIUM 5000 UNIT(S): 5000 INJECTION INTRAVENOUS; SUBCUTANEOUS at 21:59

## 2019-02-16 RX ADMIN — Medication 5 MILLIGRAM(S): at 21:58

## 2019-02-16 RX ADMIN — Medication 500 MILLIGRAM(S): at 12:04

## 2019-02-16 RX ADMIN — PANTOPRAZOLE SODIUM 40 MILLIGRAM(S): 20 TABLET, DELAYED RELEASE ORAL at 06:38

## 2019-02-16 RX ADMIN — Medication 1 TABLET(S): at 06:39

## 2019-02-16 RX ADMIN — GABAPENTIN 300 MILLIGRAM(S): 400 CAPSULE ORAL at 06:38

## 2019-02-16 RX ADMIN — OXYCODONE AND ACETAMINOPHEN 1 TABLET(S): 5; 325 TABLET ORAL at 03:04

## 2019-02-16 RX ADMIN — POLYETHYLENE GLYCOL 3350 17 GRAM(S): 17 POWDER, FOR SOLUTION ORAL at 12:06

## 2019-02-16 RX ADMIN — AMLODIPINE BESYLATE 10 MILLIGRAM(S): 2.5 TABLET ORAL at 06:38

## 2019-02-16 RX ADMIN — Medication 1000 UNIT(S): at 12:05

## 2019-02-16 RX ADMIN — Medication 1 MILLIGRAM(S): at 12:06

## 2019-02-16 NOTE — PROGRESS NOTE ADULT - PROBLEM SELECTOR PLAN 1
s/p vancomycin, monitor vanc trough.  Given septic encephalopathy and high fever, broaden spectrum with Meropenem.  f/u blood cultures. resent 2/14  ID f/u appreciated  Will temporarily hold cyclosporine given concern for recurrent infection and still bacteremic. previously seen by Dermatology. reconsulted but states no urgent issues.   Patient also on chronic steroids for pyoderma.   Currently on Prednisone 5 mg qdaily until this saturaday (which is her last dose)  Per orthopedic surgery can be OOB primarily to wheelchair and can 50% weight bear on R leg if straight in immobilizer.  ID f/u for Coags Neg staph bacteremia. wound care consult and wound vac management appreciated.  LE edema likely due to IVF. will d/c IVF. Cr normal now.   LE dopplers to r/o DVT s/p vancomycin, monitor vanc trough.  Given septic encephalopathy and high fever, broaden spectrum with Meropenem.  f/u blood cultures. resent 2/14  ID f/u appreciated  Will temporarily hold cyclosporine given concern for recurrent infection and still bacteremic. previously seen by Dermatology. reconsulted but states no urgent issues.   Patient also on chronic steroids for pyoderma.   Currently on Prednisone 5 mg qdaily until this saturaday (which is her last dose)  Per orthopedic surgery can be OOB primarily to wheelchair and can 50% weight bear on R leg if straight in immobilizer.  ID f/u for Coags Neg staph bacteremia. wound care consult and wound vac management appreciated.  LE edema likely due to IVF. will d/c IVF. Cr normal now.   LE dopplers to r/o DVT  TTE pending and likely will need ERIKA to r/o endocarditis given bacteremia

## 2019-02-16 NOTE — PROGRESS NOTE ADULT - SUBJECTIVE AND OBJECTIVE BOX
CC: f/u for coag negative staph bacteremia    Patient reports: no complaints this Am    REVIEW OF SYSTEMS:  All other review of systems negative (Comprehensive ROS)    Antimicrobials Day #  :day 4  vancomycin  IVPB 1000 milliGRAM(s) IV Intermittent every 24 hours  vancomycin  IVPB        Other Medications Reviewed    T(F): 98.9 (02-16-19 @ 06:49), Max: 98.9 (02-15-19 @ 22:16)  HR: 80 (02-16-19 @ 06:49)  BP: 136/76 (02-16-19 @ 06:49)  RR: 18 (02-16-19 @ 06:49)  SpO2: 93% (02-16-19 @ 06:49)  Wt(kg): --    PHYSICAL EXAM:  General: alert, no acute distress  Eyes:  anicteric, no conjunctival injection, no discharge  Oropharynx: no lesions or injection 	  Neck: supple, without adenopathy  Lungs: clear to auscultation  Heart: regular rate and rhythm; no murmur, rubs or gallops  Abdomen: soft, nondistended, nontender, without mass or organomegaly  Skin: Rt and left flank/buttock lesions as described.  Extremities: no clubbing, cyanosis, or edema. Rt knee spacer is without signs of infection.  Neurologic: alert, oriented, moves all extremities    LAB RESULTS:                        8.2    10.1  )-----------( 200      ( 15 Feb 2019 09:37 )             25.6     02-15    142  |  108  |  22  ----------------------------<  117<H>  4.3   |  22  |  1.16    Ca    9.0      15 Feb 2019 09:33          MICROBIOLOGY:  RECENT CULTURES:  02-14 @ 12:00 .Blood Blood     No growth to date.      02-12 @ 17:38 .Urine Clean Catch (Midstream)     No growth      02-12 @ 15:55 .Blood Blood-Peripheral Blood Culture PCR  Staphylococcus epidermidis          Growth in aerobic bottle: Gram Positive Cocci in Clusters  Growth in anaerobic bottle: Gram Positive Cocci in Clusters        RADIOLOGY REVIEWED:    < from: Xray Chest 1 View- PORTABLE-Urgent (02.12.19 @ 12:33) >  INTERPRETATION:  A single chest x-ray was obtained on February 12, 2019.    Indication: Shortness of breath.    Impression:    The heart is enlarged. Elevation right hemidiaphragm. The lungs appear to   be clear. No radiographic evidence for congestive heart failure. A pacer   is in good position. No change in the appearance of the chest when   compared to previous study done November 12, 2018.    < end of copied text >

## 2019-02-16 NOTE — PROGRESS NOTE ADULT - SUBJECTIVE AND OBJECTIVE BOX
Patient is a 78y old  Female who presents with a chief complaint of Cellulitis (16 Feb 2019 07:34)      SUBJECTIVE / OVERNIGHT EVENTS:  feels well.   LE edema new.  dc IVF.  breathing better.  no cp, no sob, no n/v/d. no abdominal pain.  no headache, no dizziness.   TTE pending      Vital Signs Last 24 Hrs  T(C): 36.7 (16 Feb 2019 07:42), Max: 37.2 (15 Feb 2019 22:16)  T(F): 98.1 (16 Feb 2019 07:42), Max: 98.9 (15 Feb 2019 22:16)  HR: 89 (16 Feb 2019 07:42) (80 - 91)  BP: 105/54 (16 Feb 2019 07:42) (93/64 - 136/76)  BP(mean): --  RR: 18 (16 Feb 2019 07:42) (18 - 20)  SpO2: 92% (16 Feb 2019 07:42) (90% - 93%)  I&O's Summary    15 Feb 2019 07:01  -  16 Feb 2019 07:00  --------------------------------------------------------  IN: 180 mL / OUT: 200 mL / NET: -20 mL        PHYSICAL EXAM:  GENERAL: NAD, Comfortable, obese lady, lying in bed  HEAD:  Atraumatic, Normocephalic  EYES: EOMI, PERRLA, conjunctiva and sclera clear  NECK: Supple, No JVD  CHEST/LUNG: mild decrease breath sounds bilaterally; No wheeze   HEART: Regular rate and rhythm; No murmurs, rubs, or gallops  ABDOMEN: Soft, Nontender, Nondistended; Bowel sounds present  Neuro: AAO x 2, no focal deficit  EXTREMITIES:  2+ Peripheral Pulses, No clubbing, cyanosis.  1-2+ pitting edema LE  Back: sacral and thigh wound. +wound vac  SKIN: No rashes or lesions      LABS:                        8.1    10.04 )-----------( 237      ( 16 Feb 2019 10:17 )             27.9     02-16    141  |  108  |  24<H>  ----------------------------<  108<H>  4.6   |  19<L>  |  1.29    Ca    9.0      16 Feb 2019 08:59        CAPILLARY BLOOD GLUCOSE                RADIOLOGY & ADDITIONAL TESTS:    Imaging Personally Reviewed:  [x] YES  [ ] NO    Consultant(s) Notes Reviewed:  [x] YES  [ ] NO      MEDICATIONS  (STANDING):  amLODIPine   Tablet 10 milliGRAM(s) Oral daily  ascorbic acid 500 milliGRAM(s) Oral daily  aspirin enteric coated 81 milliGRAM(s) Oral daily  atorvastatin 10 milliGRAM(s) Oral at bedtime  bisacodyl 5 milliGRAM(s) Oral at bedtime  calcium carbonate 1250 mG  + Vitamin D (OsCal 500 + D) 1 Tablet(s) Oral three times a day  cholecalciferol 1000 Unit(s) Oral daily  escitalopram 5 milliGRAM(s) Oral daily  folic acid 1 milliGRAM(s) Oral daily  gabapentin 300 milliGRAM(s) Oral two times a day  heparin  Injectable 5000 Unit(s) SubCutaneous every 8 hours  melatonin 3 milliGRAM(s) Oral at bedtime  metoprolol succinate ER 25 milliGRAM(s) Oral daily  pantoprazole    Tablet 40 milliGRAM(s) Oral before breakfast  polyethylene glycol 3350 17 Gram(s) Oral daily  predniSONE   Tablet 5 milliGRAM(s) Oral daily  vancomycin  IVPB 1000 milliGRAM(s) IV Intermittent every 24 hours  vancomycin  IVPB      vitamin B complex with vitamin C 1 Tablet(s) Oral daily    MEDICATIONS  (PRN):  acetaminophen   Tablet .. 650 milliGRAM(s) Oral every 6 hours PRN Mild Pain (1 - 3)  oxyCODONE    5 mG/acetaminophen 325 mG 1 Tablet(s) Oral every 6 hours PRN Severe Pain (7 - 10)      Care Discussed with Consultants/Other Providers [x] YES  [ ] NO    HEALTH ISSUES - PROBLEM Dx:  Pyoderma gangrenosum: Pyoderma gangrenosum  Depression, unspecified depression type: Depression, unspecified depression type  Medication management: Medication management  Gastroesophageal reflux disease, esophagitis presence not specified: Gastroesophageal reflux disease, esophagitis presence not specified  Aortic stenosis, severe: Aortic stenosis, severe  Coronary artery disease involving native coronary artery of native heart without angina pectoris: Coronary artery disease involving native coronary artery of native heart without angina pectoris  Altered mental status, unspecified altered mental status type: Altered mental status, unspecified altered mental status type  Cellulitis of leg, right: Cellulitis of leg, right

## 2019-02-16 NOTE — PROGRESS NOTE ADULT - ASSESSMENT
79 yo female with history of PMR, Pyoderma Gangrenosum, s/p TAVR and explant of infected RT Knee for MRSA ,now admitted with fever to 102 and leukocytosis.  She is on a steroid taper and cyclosporine for PG and is supposed to be taking doxy for MRSA suppression.  Her picture is suggestive of infection with fever and leukocytosis although exam is somewhat non focal.  By report her wounds have been improving.She also has been treated for appendicitis in the past year and PICC line related coag negative staph bacteremia.  She may be colonized with resistant roopa.  Her urine isn sterile and wound care note confirms lack of wound infection.Her knee spacer appears benign in appearance, not the source of bacteremia.  Hence, I agree with DR Mace that coag negative staph bacteremia raises concerns of either heart valve infection or PPM infection.  Her isolate is resistant to beta lactams and most second line agents, Vanco EDWARD on high side, 4.  2/14 blood cultures are negative and she has responded, decreased fever and decreased wbc count  Suggest:  1.favor starting with ECHO, would advise ERIKA as well  2.Continue vanco 1 gram daily, will check in afew days  4.Will consider addition of rifampin  5.Follow wbc, temps and exam on this regimen..We will consider alternative sto vanco given elevated EDWARD such as daptomycin, however with clinical response will not make change today.

## 2019-02-17 LAB
ANION GAP SERPL CALC-SCNC: 12 MMOL/L — SIGNIFICANT CHANGE UP (ref 5–17)
BUN SERPL-MCNC: 26 MG/DL — HIGH (ref 7–23)
CALCIUM SERPL-MCNC: 8.8 MG/DL — SIGNIFICANT CHANGE UP (ref 8.4–10.5)
CHLORIDE SERPL-SCNC: 110 MMOL/L — HIGH (ref 96–108)
CO2 SERPL-SCNC: 19 MMOL/L — LOW (ref 22–31)
CREAT SERPL-MCNC: 1.44 MG/DL — HIGH (ref 0.5–1.3)
FERRITIN SERPL-MCNC: 97 NG/ML — SIGNIFICANT CHANGE UP (ref 15–150)
GLUCOSE SERPL-MCNC: 153 MG/DL — HIGH (ref 70–99)
HCT VFR BLD CALC: 26 % — LOW (ref 34.5–45)
HGB BLD-MCNC: 7.5 G/DL — LOW (ref 11.5–15.5)
IRON SATN MFR SERPL: 13 UG/DL — LOW (ref 30–160)
IRON SATN MFR SERPL: 7 % — LOW (ref 14–50)
MAGNESIUM SERPL-MCNC: 1.6 MG/DL — SIGNIFICANT CHANGE UP (ref 1.6–2.6)
MCHC RBC-ENTMCNC: 26.8 PG — LOW (ref 27–34)
MCHC RBC-ENTMCNC: 28.8 GM/DL — LOW (ref 32–36)
MCV RBC AUTO: 92.9 FL — SIGNIFICANT CHANGE UP (ref 80–100)
PLATELET # BLD AUTO: 248 K/UL — SIGNIFICANT CHANGE UP (ref 150–400)
POTASSIUM SERPL-MCNC: 4.9 MMOL/L — SIGNIFICANT CHANGE UP (ref 3.5–5.3)
POTASSIUM SERPL-SCNC: 4.9 MMOL/L — SIGNIFICANT CHANGE UP (ref 3.5–5.3)
RBC # BLD: 2.8 M/UL — LOW (ref 3.8–5.2)
RBC # FLD: 17.2 % — HIGH (ref 10.3–14.5)
SODIUM SERPL-SCNC: 141 MMOL/L — SIGNIFICANT CHANGE UP (ref 135–145)
TIBC SERPL-MCNC: 174 UG/DL — LOW (ref 220–430)
UIBC SERPL-MCNC: 161 UG/DL — SIGNIFICANT CHANGE UP (ref 110–370)
WBC # BLD: 11.38 K/UL — HIGH (ref 3.8–10.5)
WBC # FLD AUTO: 11.38 K/UL — HIGH (ref 3.8–10.5)

## 2019-02-17 RX ORDER — IRON SUCROSE 20 MG/ML
200 INJECTION, SOLUTION INTRAVENOUS ONCE
Qty: 0 | Refills: 0 | Status: COMPLETED | OUTPATIENT
Start: 2019-02-17 | End: 2019-02-17

## 2019-02-17 RX ORDER — CYCLOSPORINE 100 MG/1
100 CAPSULE ORAL
Qty: 0 | Refills: 0 | Status: DISCONTINUED | OUTPATIENT
Start: 2019-02-17 | End: 2019-02-18

## 2019-02-17 RX ORDER — ACETAMINOPHEN 500 MG
1000 TABLET ORAL ONCE
Qty: 0 | Refills: 0 | Status: COMPLETED | OUTPATIENT
Start: 2019-02-17 | End: 2019-02-17

## 2019-02-17 RX ADMIN — POLYETHYLENE GLYCOL 3350 17 GRAM(S): 17 POWDER, FOR SOLUTION ORAL at 11:33

## 2019-02-17 RX ADMIN — Medication 400 MILLIGRAM(S): at 23:11

## 2019-02-17 RX ADMIN — Medication 5 MILLIGRAM(S): at 22:19

## 2019-02-17 RX ADMIN — Medication 81 MILLIGRAM(S): at 11:29

## 2019-02-17 RX ADMIN — GABAPENTIN 300 MILLIGRAM(S): 400 CAPSULE ORAL at 17:44

## 2019-02-17 RX ADMIN — OXYCODONE AND ACETAMINOPHEN 1 TABLET(S): 5; 325 TABLET ORAL at 09:00

## 2019-02-17 RX ADMIN — PANTOPRAZOLE SODIUM 40 MILLIGRAM(S): 20 TABLET, DELAYED RELEASE ORAL at 05:36

## 2019-02-17 RX ADMIN — ESCITALOPRAM OXALATE 5 MILLIGRAM(S): 10 TABLET, FILM COATED ORAL at 11:30

## 2019-02-17 RX ADMIN — Medication 5 MILLIGRAM(S): at 05:35

## 2019-02-17 RX ADMIN — Medication 3 MILLIGRAM(S): at 22:19

## 2019-02-17 RX ADMIN — HEPARIN SODIUM 5000 UNIT(S): 5000 INJECTION INTRAVENOUS; SUBCUTANEOUS at 22:19

## 2019-02-17 RX ADMIN — ATORVASTATIN CALCIUM 10 MILLIGRAM(S): 80 TABLET, FILM COATED ORAL at 22:19

## 2019-02-17 RX ADMIN — Medication 1000 UNIT(S): at 11:29

## 2019-02-17 RX ADMIN — Medication 1 TABLET(S): at 14:06

## 2019-02-17 RX ADMIN — Medication 1 TABLET(S): at 17:44

## 2019-02-17 RX ADMIN — HEPARIN SODIUM 5000 UNIT(S): 5000 INJECTION INTRAVENOUS; SUBCUTANEOUS at 14:06

## 2019-02-17 RX ADMIN — Medication 25 MILLIGRAM(S): at 05:36

## 2019-02-17 RX ADMIN — IRON SUCROSE 110 MILLIGRAM(S): 20 INJECTION, SOLUTION INTRAVENOUS at 17:45

## 2019-02-17 RX ADMIN — GABAPENTIN 300 MILLIGRAM(S): 400 CAPSULE ORAL at 05:35

## 2019-02-17 RX ADMIN — OXYCODONE AND ACETAMINOPHEN 1 TABLET(S): 5; 325 TABLET ORAL at 08:06

## 2019-02-17 RX ADMIN — Medication 1000 MILLIGRAM(S): at 23:41

## 2019-02-17 RX ADMIN — Medication 500 MILLIGRAM(S): at 11:28

## 2019-02-17 RX ADMIN — HEPARIN SODIUM 5000 UNIT(S): 5000 INJECTION INTRAVENOUS; SUBCUTANEOUS at 05:35

## 2019-02-17 RX ADMIN — Medication 1 TABLET(S): at 05:35

## 2019-02-17 RX ADMIN — OXYCODONE AND ACETAMINOPHEN 1 TABLET(S): 5; 325 TABLET ORAL at 20:47

## 2019-02-17 RX ADMIN — Medication 250 MILLIGRAM(S): at 10:54

## 2019-02-17 RX ADMIN — CYCLOSPORINE 100 MILLIGRAM(S): 100 CAPSULE ORAL at 17:45

## 2019-02-17 RX ADMIN — Medication 1 TABLET(S): at 22:19

## 2019-02-17 RX ADMIN — Medication 1 MILLIGRAM(S): at 11:29

## 2019-02-17 RX ADMIN — OXYCODONE AND ACETAMINOPHEN 1 TABLET(S): 5; 325 TABLET ORAL at 19:47

## 2019-02-17 RX ADMIN — AMLODIPINE BESYLATE 10 MILLIGRAM(S): 2.5 TABLET ORAL at 05:35

## 2019-02-17 NOTE — PROGRESS NOTE ADULT - PROBLEM SELECTOR PLAN 1
s/p vancomycin, monitor vanc trough.  Given septic encephalopathy and high fever, broaden spectrum with Meropenem.  f/u blood cultures. resent 2/14  ID f/u appreciated  cyclosporine temporarily held given concern for recurrent infection.   d/w ID, okay to restart today. Previously seen by Dermatology. reconsulted but states no urgent issues. to c/w steroid sparing agent.   Patient also on chronic steroids taper for pyoderma.   Currently on Prednisone 5 mg qdaily until this Saturday (which is her last dose). will d/c.   Per orthopedic surgery can be OOB primarily to wheelchair and can 50% weight bear on R leg if straight in immobilizer.  ID f/u for Coags Neg staph bacteremia. wound care consult and wound vac management appreciated.  LE edema likely due to IVF. will d/c IVF. Cr stable. PRN IVF if needed  LE dopplers to r/o DVT  *** TTE pending and likely will need ERIKA to r/o endocarditis given bacteremia

## 2019-02-17 NOTE — PROGRESS NOTE ADULT - ASSESSMENT
79 yo female with history of PMR, Pyoderma Gangrenosum, s/p TAVR and explant of infected RT Knee for MRSA ,now admitted with fever to 102 and leukocytosis.  She is on a steroid taper and cyclosporine for PG and is supposed to be taking doxy for MRSA suppression.  Her picture is suggestive of infection with fever and leukocytosis although exam is somewhat non focal.  By report her wounds have been improving.She also has been treated for appendicitis in the past year and PICC line related coag negative staph bacteremia.  She may be colonized with resistant roopa.  Her urine isn sterile and wound care note confirms lack of wound infection.Her knee spacer appears benign in appearance, not the source of bacteremia.  Hence, I agree with DR Mace that coag negative staph bacteremia raises concerns of either heart valve infection or PPM infection.  Her isolate is resistant to beta lactams and most second line agents, Vanco EDWARD on high side, 4.  2/14 blood cultures are negative and she has responded, decreased fever and decreased wbc count  Suggest:  1.favor starting with ECHO, would advise ERIKA as well  2.Continue vanco 1 gram daily, trough is 18, acceptable, will need to be followed  4.Will consider addition of rifampin  5.Follow wbc, temps and exam on this regimen..We will consider alternative to vanco given elevated EDWARD such as daptomycin, however with clinical response will not make change today.  6.Appreciate medical f/u  7.Cyclosporine on hold but if blood cultures stay negative I have no objection to its use as treatment of PG is needed.

## 2019-02-17 NOTE — PROGRESS NOTE ADULT - SUBJECTIVE AND OBJECTIVE BOX
CC: f/u for coag negative staph bacteremia    Patient reports: she is with no focal complaints    REVIEW OF SYSTEMS:  All other review of systems negative (Comprehensive ROS)    Antimicrobials Day #  :day 5  vancomycin  IVPB 1000 milliGRAM(s) IV Intermittent every 24 hours  vancomycin  IVPB        Other Medications Reviewed    T(F): 99.4 (02-17-19 @ 05:37), Max: 99.4 (02-17-19 @ 05:37)  HR: 80 (02-17-19 @ 05:37)  BP: 132/75 (02-17-19 @ 05:37)  RR: 18 (02-17-19 @ 05:37)  SpO2: 93% (02-17-19 @ 05:37)  Wt(kg): --    PHYSICAL EXAM:  General: alert, no acute distress  Eyes:  anicteric, no conjunctival injection, no discharge  Oropharynx: no lesions or injection 	  Neck: supple, without adenopathy  Lungs: clear to auscultation  Heart: regular rate and rhythm; no murmur, rubs or gallops  Abdomen: soft, nondistended, nontender, without mass or organomegaly  Skin: flank wounds dressed  Extremities: no clubbing, cyanosis, or edema  Neurologic: alert, oriented, moves all extremities  Rt knee is without signs of a soft tissue infection  LAB RESULTS:                        8.1    10.04 )-----------( 237      ( 16 Feb 2019 10:17 )             27.9     02-16    141  |  108  |  24<H>  ----------------------------<  108<H>  4.6   |  19<L>  |  1.29    Ca    9.0      16 Feb 2019 08:59    vanco trough 18      MICROBIOLOGY:  RECENT CULTURES:  02-14 @ 12:00 .Blood Blood     No growth to date.      02-12 @ 17:38 .Urine Clean Catch (Midstream)     No growth      02-12 @ 15:55 .Blood Blood-Peripheral Blood Culture PCR  Staphylococcus epidermidis    Growth in aerobic and anaerobic bottles: Staphylococcus epidermidis  \  ,        Growth in aerobic bottle: Gram Positive Cocci in Clusters  Growth in anaerobic bottle: Gram Positive Cocci in Clusters        RADIOLOGY REVIEWED:

## 2019-02-17 NOTE — PROGRESS NOTE ADULT - SUBJECTIVE AND OBJECTIVE BOX
Patient is a 78y old  Female who presents with a chief complaint of Cellulitis (17 Feb 2019 07:15)      SUBJECTIVE / OVERNIGHT EVENTS:  feeling better.  PT taking care of wound vac at bedside.  no cp, no sob, no n/v/d. no abdominal pain.  no headache, no dizziness.       Vital Signs Last 24 Hrs  T(C): 37 (17 Feb 2019 10:26), Max: 37.4 (17 Feb 2019 05:37)  T(F): 98.6 (17 Feb 2019 10:26), Max: 99.4 (17 Feb 2019 05:37)  HR: 76 (17 Feb 2019 10:26) (76 - 80)  BP: 108/71 (17 Feb 2019 10:26) (104/66 - 132/75)  BP(mean): --  RR: 18 (17 Feb 2019 10:26) (18 - 18)  SpO2: 92% (17 Feb 2019 10:26) (92% - 97%)  I&O's Summary    16 Feb 2019 07:01  -  17 Feb 2019 07:00  --------------------------------------------------------  IN: 1100 mL / OUT: 250 mL / NET: 850 mL      PHYSICAL EXAM:  GENERAL: NAD, Comfortable, obese lady, lying in bed  HEAD:  Atraumatic, Normocephalic  EYES: EOMI, PERRLA, conjunctiva and sclera clear  NECK: Supple, No JVD  CHEST/LUNG: mild decrease breath sounds bilaterally; No wheeze   HEART: Regular rate and rhythm; No murmurs, rubs, or gallops  ABDOMEN: Soft, Nontender, Nondistended; Bowel sounds present  Neuro: AAO x 2, no focal deficit  EXTREMITIES:  2+ Peripheral Pulses, No clubbing, cyanosis.  1-2+ pitting edema LE  Back: sacral and thigh wound. +wound vac  SKIN: No rashes or lesions      LABS:                        7.5    11.38 )-----------( 248      ( 17 Feb 2019 13:45 )             26.0     02-17    141  |  110<H>  |  26<H>  ----------------------------<  153<H>  4.9   |  19<L>  |  1.44<H>    Ca    8.8      17 Feb 2019 11:58  Mg     1.6     02-17        CAPILLARY BLOOD GLUCOSE                RADIOLOGY & ADDITIONAL TESTS:    Imaging Personally Reviewed:  [x] YES  [ ] NO    Consultant(s) Notes Reviewed:  [x] YES  [ ] NO      MEDICATIONS  (STANDING):  amLODIPine   Tablet 10 milliGRAM(s) Oral daily  ascorbic acid 500 milliGRAM(s) Oral daily  aspirin enteric coated 81 milliGRAM(s) Oral daily  atorvastatin 10 milliGRAM(s) Oral at bedtime  bisacodyl 5 milliGRAM(s) Oral at bedtime  calcium carbonate 1250 mG  + Vitamin D (OsCal 500 + D) 1 Tablet(s) Oral three times a day  cholecalciferol 1000 Unit(s) Oral daily  cycloSPORINE  (SandIMMUNE) 100 milliGRAM(s) Oral two times a day  escitalopram 5 milliGRAM(s) Oral daily  folic acid 1 milliGRAM(s) Oral daily  gabapentin 300 milliGRAM(s) Oral two times a day  heparin  Injectable 5000 Unit(s) SubCutaneous every 8 hours  iron sucrose IVPB 200 milliGRAM(s) IV Intermittent once  melatonin 3 milliGRAM(s) Oral at bedtime  metoprolol succinate ER 25 milliGRAM(s) Oral daily  pantoprazole    Tablet 40 milliGRAM(s) Oral before breakfast  polyethylene glycol 3350 17 Gram(s) Oral daily  vancomycin  IVPB 1000 milliGRAM(s) IV Intermittent every 24 hours  vancomycin  IVPB      vitamin B complex with vitamin C 1 Tablet(s) Oral daily    MEDICATIONS  (PRN):  acetaminophen   Tablet .. 650 milliGRAM(s) Oral every 6 hours PRN Mild Pain (1 - 3)  oxyCODONE    5 mG/acetaminophen 325 mG 1 Tablet(s) Oral every 6 hours PRN Severe Pain (7 - 10)      Care Discussed with Consultants/Other Providers [x] YES  [ ] NO    HEALTH ISSUES - PROBLEM Dx:  Pyoderma gangrenosum: Pyoderma gangrenosum  Depression, unspecified depression type: Depression, unspecified depression type  Medication management: Medication management  Gastroesophageal reflux disease, esophagitis presence not specified: Gastroesophageal reflux disease, esophagitis presence not specified  Aortic stenosis, severe: Aortic stenosis, severe  Coronary artery disease involving native coronary artery of native heart without angina pectoris: Coronary artery disease involving native coronary artery of native heart without angina pectoris  Altered mental status, unspecified altered mental status type: Altered mental status, unspecified altered mental status type  Cellulitis of leg, right: Cellulitis of leg, right

## 2019-02-18 LAB
ANION GAP SERPL CALC-SCNC: 15 MMOL/L — SIGNIFICANT CHANGE UP (ref 5–17)
BUN SERPL-MCNC: 30 MG/DL — HIGH (ref 7–23)
CALCIUM SERPL-MCNC: 9.1 MG/DL — SIGNIFICANT CHANGE UP (ref 8.4–10.5)
CHLORIDE SERPL-SCNC: 104 MMOL/L — SIGNIFICANT CHANGE UP (ref 96–108)
CO2 SERPL-SCNC: 19 MMOL/L — LOW (ref 22–31)
CREAT SERPL-MCNC: 1.48 MG/DL — HIGH (ref 0.5–1.3)
GLUCOSE SERPL-MCNC: 107 MG/DL — HIGH (ref 70–99)
HCT VFR BLD CALC: 27.4 % — LOW (ref 34.5–45)
HGB BLD-MCNC: 8 G/DL — LOW (ref 11.5–15.5)
MCHC RBC-ENTMCNC: 27.4 PG — SIGNIFICANT CHANGE UP (ref 27–34)
MCHC RBC-ENTMCNC: 29.2 GM/DL — LOW (ref 32–36)
MCV RBC AUTO: 93.8 FL — SIGNIFICANT CHANGE UP (ref 80–100)
PLATELET # BLD AUTO: 277 K/UL — SIGNIFICANT CHANGE UP (ref 150–400)
POTASSIUM SERPL-MCNC: 4.7 MMOL/L — SIGNIFICANT CHANGE UP (ref 3.5–5.3)
POTASSIUM SERPL-SCNC: 4.7 MMOL/L — SIGNIFICANT CHANGE UP (ref 3.5–5.3)
RBC # BLD: 2.92 M/UL — LOW (ref 3.8–5.2)
RBC # FLD: 16.9 % — HIGH (ref 10.3–14.5)
SODIUM SERPL-SCNC: 138 MMOL/L — SIGNIFICANT CHANGE UP (ref 135–145)
WBC # BLD: 11.09 K/UL — HIGH (ref 3.8–10.5)
WBC # FLD AUTO: 11.09 K/UL — HIGH (ref 3.8–10.5)

## 2019-02-18 RX ORDER — CYCLOSPORINE 100 MG/1
200 CAPSULE ORAL
Qty: 0 | Refills: 0 | Status: DISCONTINUED | OUTPATIENT
Start: 2019-02-18 | End: 2019-02-19

## 2019-02-18 RX ADMIN — OXYCODONE AND ACETAMINOPHEN 1 TABLET(S): 5; 325 TABLET ORAL at 13:08

## 2019-02-18 RX ADMIN — GABAPENTIN 300 MILLIGRAM(S): 400 CAPSULE ORAL at 05:32

## 2019-02-18 RX ADMIN — Medication 1000 UNIT(S): at 12:53

## 2019-02-18 RX ADMIN — CYCLOSPORINE 200 MILLIGRAM(S): 100 CAPSULE ORAL at 17:33

## 2019-02-18 RX ADMIN — Medication 1 MILLIGRAM(S): at 12:53

## 2019-02-18 RX ADMIN — POLYETHYLENE GLYCOL 3350 17 GRAM(S): 17 POWDER, FOR SOLUTION ORAL at 12:54

## 2019-02-18 RX ADMIN — Medication 1 TABLET(S): at 12:53

## 2019-02-18 RX ADMIN — OXYCODONE AND ACETAMINOPHEN 1 TABLET(S): 5; 325 TABLET ORAL at 06:32

## 2019-02-18 RX ADMIN — CYCLOSPORINE 100 MILLIGRAM(S): 100 CAPSULE ORAL at 05:33

## 2019-02-18 RX ADMIN — Medication 650 MILLIGRAM(S): at 14:18

## 2019-02-18 RX ADMIN — Medication 5 MILLIGRAM(S): at 22:49

## 2019-02-18 RX ADMIN — HEPARIN SODIUM 5000 UNIT(S): 5000 INJECTION INTRAVENOUS; SUBCUTANEOUS at 12:53

## 2019-02-18 RX ADMIN — Medication 81 MILLIGRAM(S): at 12:53

## 2019-02-18 RX ADMIN — Medication 1 TABLET(S): at 05:32

## 2019-02-18 RX ADMIN — Medication 1 TABLET(S): at 22:49

## 2019-02-18 RX ADMIN — HEPARIN SODIUM 5000 UNIT(S): 5000 INJECTION INTRAVENOUS; SUBCUTANEOUS at 05:31

## 2019-02-18 RX ADMIN — Medication 650 MILLIGRAM(S): at 15:18

## 2019-02-18 RX ADMIN — ATORVASTATIN CALCIUM 10 MILLIGRAM(S): 80 TABLET, FILM COATED ORAL at 22:49

## 2019-02-18 RX ADMIN — Medication 500 MILLIGRAM(S): at 12:53

## 2019-02-18 RX ADMIN — AMLODIPINE BESYLATE 10 MILLIGRAM(S): 2.5 TABLET ORAL at 05:32

## 2019-02-18 RX ADMIN — HEPARIN SODIUM 5000 UNIT(S): 5000 INJECTION INTRAVENOUS; SUBCUTANEOUS at 22:49

## 2019-02-18 RX ADMIN — PANTOPRAZOLE SODIUM 40 MILLIGRAM(S): 20 TABLET, DELAYED RELEASE ORAL at 05:32

## 2019-02-18 RX ADMIN — Medication 250 MILLIGRAM(S): at 10:02

## 2019-02-18 RX ADMIN — Medication 25 MILLIGRAM(S): at 05:32

## 2019-02-18 RX ADMIN — GABAPENTIN 300 MILLIGRAM(S): 400 CAPSULE ORAL at 17:33

## 2019-02-18 RX ADMIN — Medication 3 MILLIGRAM(S): at 22:49

## 2019-02-18 RX ADMIN — Medication 1 TABLET(S): at 13:08

## 2019-02-18 RX ADMIN — OXYCODONE AND ACETAMINOPHEN 1 TABLET(S): 5; 325 TABLET ORAL at 14:08

## 2019-02-18 RX ADMIN — OXYCODONE AND ACETAMINOPHEN 1 TABLET(S): 5; 325 TABLET ORAL at 05:32

## 2019-02-18 RX ADMIN — ESCITALOPRAM OXALATE 5 MILLIGRAM(S): 10 TABLET, FILM COATED ORAL at 12:53

## 2019-02-18 NOTE — PROVIDER CONTACT NOTE (OTHER) - BACKGROUND
Dz cellulitis 2/12, PMH CAD, anxiety, disc disease Dx cellulitis 2/12, PMH CAD, anxiety, disc disease

## 2019-02-18 NOTE — PROGRESS NOTE ADULT - SUBJECTIVE AND OBJECTIVE BOX
Patient is a 78y old  Female who presents with a chief complaint of Cellulitis (17 Feb 2019 16:36)      SUBJECTIVE / OVERNIGHT EVENTS:  stable overall.  awake alert. comfortable.  denied pain.  no n/v/d.   tolerating diet.   no BM yet per the aide.      Vital Signs Last 24 Hrs  T(C): 36.9 (18 Feb 2019 07:15), Max: 37.2 (17 Feb 2019 22:25)  T(F): 98.4 (18 Feb 2019 07:15), Max: 98.9 (17 Feb 2019 22:25)  HR: 78 (18 Feb 2019 07:15) (74 - 78)  BP: 116/73 (18 Feb 2019 07:15) (116/73 - 128/76)  BP(mean): --  RR: 18 (18 Feb 2019 07:15) (18 - 18)  SpO2: 93% (18 Feb 2019 07:15) (93% - 94%)  I&O's Summary    17 Feb 2019 07:01  -  18 Feb 2019 07:00  --------------------------------------------------------  IN: 1010 mL / OUT: 200 mL / NET: 810 mL        PHYSICAL EXAM:  GENERAL: NAD, Comfortable, obese lady, lying in bed  HEAD:  Atraumatic, Normocephalic  EYES: EOMI, PERRLA, conjunctiva and sclera clear  NECK: Supple, No JVD  CHEST/LUNG: mild decrease breath sounds bilaterally; No wheeze   HEART: Regular rate and rhythm; No murmurs, rubs, or gallops  ABDOMEN: Soft, Nontender, Nondistended; Bowel sounds present  Neuro: AAO x 2, no focal deficit  EXTREMITIES:  2+ Peripheral Pulses, No clubbing, cyanosis.  1-2+ nonpitting edema LE  Back: sacral and thigh wound. +wound vac in place  SKIN: No rashes or lesions      LABS:                        7.5    11.38 )-----------( 248      ( 17 Feb 2019 13:45 )             26.0     02-18    138  |  104  |  30<H>  ----------------------------<  107<H>  4.7   |  19<L>  |  1.48<H>    Ca    9.1      18 Feb 2019 10:26  Mg     1.6     02-17        CAPILLARY BLOOD GLUCOSE                RADIOLOGY & ADDITIONAL TESTS:    Imaging Personally Reviewed:  [x] YES  [ ] NO    Consultant(s) Notes Reviewed:  [x] YES  [ ] NO      MEDICATIONS  (STANDING):  amLODIPine   Tablet 10 milliGRAM(s) Oral daily  ascorbic acid 500 milliGRAM(s) Oral daily  aspirin enteric coated 81 milliGRAM(s) Oral daily  atorvastatin 10 milliGRAM(s) Oral at bedtime  bisacodyl 5 milliGRAM(s) Oral at bedtime  calcium carbonate 1250 mG  + Vitamin D (OsCal 500 + D) 1 Tablet(s) Oral three times a day  cholecalciferol 1000 Unit(s) Oral daily  cycloSPORINE  (SandIMMUNE) 100 milliGRAM(s) Oral two times a day  escitalopram 5 milliGRAM(s) Oral daily  folic acid 1 milliGRAM(s) Oral daily  gabapentin 300 milliGRAM(s) Oral two times a day  heparin  Injectable 5000 Unit(s) SubCutaneous every 8 hours  melatonin 3 milliGRAM(s) Oral at bedtime  metoprolol succinate ER 25 milliGRAM(s) Oral daily  pantoprazole    Tablet 40 milliGRAM(s) Oral before breakfast  polyethylene glycol 3350 17 Gram(s) Oral daily  vancomycin  IVPB 1000 milliGRAM(s) IV Intermittent every 24 hours  vancomycin  IVPB      vitamin B complex with vitamin C 1 Tablet(s) Oral daily    MEDICATIONS  (PRN):  acetaminophen   Tablet .. 650 milliGRAM(s) Oral every 6 hours PRN Mild Pain (1 - 3)  oxyCODONE    5 mG/acetaminophen 325 mG 1 Tablet(s) Oral every 6 hours PRN Severe Pain (7 - 10)      Care Discussed with Consultants/Other Providers [x] YES  [ ] NO    HEALTH ISSUES - PROBLEM Dx:  Pyoderma gangrenosum: Pyoderma gangrenosum  Depression, unspecified depression type: Depression, unspecified depression type  Medication management: Medication management  Gastroesophageal reflux disease, esophagitis presence not specified: Gastroesophageal reflux disease, esophagitis presence not specified  Aortic stenosis, severe: Aortic stenosis, severe  Coronary artery disease involving native coronary artery of native heart without angina pectoris: Coronary artery disease involving native coronary artery of native heart without angina pectoris  Altered mental status, unspecified altered mental status type: Altered mental status, unspecified altered mental status type  Cellulitis of leg, right: Cellulitis of leg, right

## 2019-02-18 NOTE — PROGRESS NOTE ADULT - PROBLEM SELECTOR PLAN 1
s/p vancomycin, monitor vanc trough.  Given septic encephalopathy and high fever, broaden spectrum with Meropenem.  f/u blood cultures. resent 2/14  ID f/u appreciated  cyclosporine temporarily held given concern for recurrent infection.   d/w ID, okay to restart today. Previously seen by Dermatology. reconsulted but states no urgent issues. to c/w steroid sparing agent.   Patient also on chronic steroids taper for pyoderma.   Currently on Prednisone 5 mg qdaily until this Saturday (which is her last dose). will d/c.   Per orthopedic surgery can be OOB primarily to wheelchair and can 50% weight bear on R leg if straight in immobilizer.  ID f/u for Coags Neg staph bacteremia. wound care consult and wound vac management appreciated.  LE edema likely due to IVF. will d/c IVF. Cr stable. PRN IVF if needed  LE dopplers to r/o DVT  *** TTE pending and likely will need ERIKA to r/o endocarditis given bacteremia s/p vancomycin, monitor vanc trough.  Given septic encephalopathy and high fever, broaden spectrum with Meropenem.  f/u blood cultures. resent 2/14, prelimp neg.   ID f/u appreciated  cyclosporine temporarily held given concern for recurrent infection, but now restarted. d/w ID, okay to restart. Previously seen by Dermatology. reconsulted but states no urgent issues. To c/w steroid sparing agent cyclosporine.   completed chronic steroids taper for pyoderma.   (on Prednisone 5 mg qdaily until this Saturday (which was her last dose))  Per orthopedic surgery can be OOB primarily to wheelchair and can 50% weight bear on R leg if straight in immobilizer.  ID f/u for Coags Neg staph bacteremia. wound care consult and wound vac management appreciated.  LE edema likely due to IVF. will d/c IVF. Cr stable. PRN IVF if needed  LE dopplers to r/o DVT  *** TTE pending and likely will need ERIKA to r/o endocarditis given bacteremia

## 2019-02-18 NOTE — PROGRESS NOTE ADULT - ASSESSMENT
77 yo female with history of PMR, Pyoderma Gangrenosum (steroid taper and cyclosporine), s/p TAVR and explant of infected R Knee for MRSA (doxy suppression, spacer) admitted with fever to 102 and leukocytosis.  Her picture suggestive of infection with fever and leukocytosis although exam somewhat non focal.  By report her wounds have been improving  She also has been treated for appendicitis in the past year and PICC  related coag negative staph bacteremia.  No obvious primary source of S epi at present- raises concern of either heart valve infection or PPM infection.  Her isolate is resistant to beta lactams and most second line agents, Vanco EDWARD on high side, 4.  F/u blood cultures 2/14 are negative; decreased fever and decreased wbc count    Suggest:  Continue vanco 1 gram daily, latest trough acceptable  Await ECHO, ?ERIKA  Follow temps and CBC/diff   Follow Vanco trough  Local wound care

## 2019-02-18 NOTE — PROGRESS NOTE ADULT - SUBJECTIVE AND OBJECTIVE BOX
CC: f/u for coag negative staph bacteremia    Patient reports comfortable except mild L flank pain    REVIEW OF SYSTEMS:  All other review of systems negative (Comprehensive ROS)    Antimicrobials Day # 6  vancomycin  IVPB 1000 milliGRAM(s) IV Intermittent every 24 hours  vancomycin  IVPB        Other Medications Reviewed    Vital Signs Last 24 Hrs  T(F): 98.2 (18 Feb 2019 15:30), Max: 98.9 (17 Feb 2019 22:25)  HR: 83 (18 Feb 2019 15:30) (74 - 83)  BP: 114/64 (18 Feb 2019 15:30) (114/64 - 128/76)  BP(mean): --  RR: 18 (18 Feb 2019 15:30) (18 - 18)  SpO2: 94% (18 Feb 2019 15:30) (93% - 94%)    PHYSICAL EXAM:  General: alert, no acute distress  Eyes:  anicteric, no conjunctival injection, no discharge  Oropharynx: no lesions or injection 	  Neck: supple, without adenopathy  Lungs: clear to auscultation  Heart: regular rate and rhythm; no murmur, rubs or gallops  Abdomen: soft, nondistended, nontender, without mass or organomegaly  Skin: L flank wounds dressed; R hip VAC, R thigh and knee/leg wounds healed  Extremities: mild edema  Neurologic: alert, moves all extremities    LAB RESULTS:                        8.0    11.09 )-----------( 277      ( 18 Feb 2019 13:09 )             27.4   02-18    138  |  104  |  30<H>  ----------------------------<  107<H>  4.7   |  19<L>  |  1.48<H>    Ca    9.1      18 Feb 2019 10:26  Mg     1.6     02-17    Vancomycin Level, Trough (02.16.19 @ 10:11)    Vancomycin Level, Trough: 18.9    MICROBIOLOGY:  RECENT CULTURES:  02-14 @ 12:00 .Blood Blood     No growth to date.    02-12 @ 15:55 .Blood Blood-Peripheral Blood Culture PCR  Staphylococcus epidermidis    Growth in aerobic and anaerobic bottles: Staphylococcus epidermidis    Culture - Blood (02.12.19 @ 15:55)    Gram Stain:   Growth in aerobic bottle: Gram Positive Cocci in Clusters    Specimen Source: .Blood Blood-Venous    Culture Results:   Growth in aerobic bottle: Staphylococcus epidermidis      RADIOLOGY REVIEWED

## 2019-02-19 DIAGNOSIS — N17.9 ACUTE KIDNEY FAILURE, UNSPECIFIED: ICD-10-CM

## 2019-02-19 LAB
ANION GAP SERPL CALC-SCNC: 13 MMOL/L — SIGNIFICANT CHANGE UP (ref 5–17)
BUN SERPL-MCNC: 34 MG/DL — HIGH (ref 7–23)
CALCIUM SERPL-MCNC: 9.2 MG/DL — SIGNIFICANT CHANGE UP (ref 8.4–10.5)
CHLORIDE SERPL-SCNC: 105 MMOL/L — SIGNIFICANT CHANGE UP (ref 96–108)
CO2 SERPL-SCNC: 19 MMOL/L — LOW (ref 22–31)
CREAT SERPL-MCNC: 1.55 MG/DL — HIGH (ref 0.5–1.3)
CULTURE RESULTS: SIGNIFICANT CHANGE UP
CULTURE RESULTS: SIGNIFICANT CHANGE UP
GLUCOSE SERPL-MCNC: 111 MG/DL — HIGH (ref 70–99)
HCT VFR BLD CALC: 26.6 % — LOW (ref 34.5–45)
HGB BLD-MCNC: 8.5 G/DL — LOW (ref 11.5–15.5)
MCHC RBC-ENTMCNC: 27.7 PG — SIGNIFICANT CHANGE UP (ref 27–34)
MCHC RBC-ENTMCNC: 31.9 GM/DL — LOW (ref 32–36)
MCV RBC AUTO: 86.8 FL — SIGNIFICANT CHANGE UP (ref 80–100)
PLATELET # BLD AUTO: 289 K/UL — SIGNIFICANT CHANGE UP (ref 150–400)
POTASSIUM SERPL-MCNC: 4.8 MMOL/L — SIGNIFICANT CHANGE UP (ref 3.5–5.3)
POTASSIUM SERPL-SCNC: 4.8 MMOL/L — SIGNIFICANT CHANGE UP (ref 3.5–5.3)
RBC # BLD: 3.06 M/UL — LOW (ref 3.8–5.2)
RBC # FLD: 16.6 % — HIGH (ref 10.3–14.5)
SODIUM SERPL-SCNC: 137 MMOL/L — SIGNIFICANT CHANGE UP (ref 135–145)
SPECIMEN SOURCE: SIGNIFICANT CHANGE UP
SPECIMEN SOURCE: SIGNIFICANT CHANGE UP
WBC # BLD: 12 K/UL — HIGH (ref 3.8–10.5)
WBC # FLD AUTO: 12 K/UL — HIGH (ref 3.8–10.5)

## 2019-02-19 RX ORDER — SODIUM CHLORIDE 9 MG/ML
1000 INJECTION INTRAMUSCULAR; INTRAVENOUS; SUBCUTANEOUS
Qty: 0 | Refills: 0 | Status: DISCONTINUED | OUTPATIENT
Start: 2019-02-19 | End: 2019-02-27

## 2019-02-19 RX ORDER — CYCLOSPORINE 100 MG/1
200 CAPSULE ORAL
Qty: 0 | Refills: 0 | Status: DISCONTINUED | OUTPATIENT
Start: 2019-02-19 | End: 2019-03-02

## 2019-02-19 RX ADMIN — ATORVASTATIN CALCIUM 10 MILLIGRAM(S): 80 TABLET, FILM COATED ORAL at 21:09

## 2019-02-19 RX ADMIN — PANTOPRAZOLE SODIUM 40 MILLIGRAM(S): 20 TABLET, DELAYED RELEASE ORAL at 06:15

## 2019-02-19 RX ADMIN — Medication 5 MILLIGRAM(S): at 21:10

## 2019-02-19 RX ADMIN — Medication 250 MILLIGRAM(S): at 12:26

## 2019-02-19 RX ADMIN — GABAPENTIN 300 MILLIGRAM(S): 400 CAPSULE ORAL at 18:49

## 2019-02-19 RX ADMIN — Medication 1 TABLET(S): at 15:18

## 2019-02-19 RX ADMIN — ESCITALOPRAM OXALATE 5 MILLIGRAM(S): 10 TABLET, FILM COATED ORAL at 12:27

## 2019-02-19 RX ADMIN — Medication 3 MILLIGRAM(S): at 21:09

## 2019-02-19 RX ADMIN — HEPARIN SODIUM 5000 UNIT(S): 5000 INJECTION INTRAVENOUS; SUBCUTANEOUS at 21:10

## 2019-02-19 RX ADMIN — HEPARIN SODIUM 5000 UNIT(S): 5000 INJECTION INTRAVENOUS; SUBCUTANEOUS at 06:16

## 2019-02-19 RX ADMIN — OXYCODONE AND ACETAMINOPHEN 1 TABLET(S): 5; 325 TABLET ORAL at 21:26

## 2019-02-19 RX ADMIN — Medication 1 TABLET(S): at 06:16

## 2019-02-19 RX ADMIN — Medication 500 MILLIGRAM(S): at 12:27

## 2019-02-19 RX ADMIN — Medication 1 TABLET(S): at 21:10

## 2019-02-19 RX ADMIN — SODIUM CHLORIDE 75 MILLILITER(S): 9 INJECTION INTRAMUSCULAR; INTRAVENOUS; SUBCUTANEOUS at 15:20

## 2019-02-19 RX ADMIN — POLYETHYLENE GLYCOL 3350 17 GRAM(S): 17 POWDER, FOR SOLUTION ORAL at 12:27

## 2019-02-19 RX ADMIN — CYCLOSPORINE 200 MILLIGRAM(S): 100 CAPSULE ORAL at 21:09

## 2019-02-19 RX ADMIN — Medication 1 MILLIGRAM(S): at 12:27

## 2019-02-19 RX ADMIN — HEPARIN SODIUM 5000 UNIT(S): 5000 INJECTION INTRAVENOUS; SUBCUTANEOUS at 15:18

## 2019-02-19 RX ADMIN — Medication 81 MILLIGRAM(S): at 12:28

## 2019-02-19 RX ADMIN — Medication 1 TABLET(S): at 12:28

## 2019-02-19 RX ADMIN — Medication 1000 UNIT(S): at 12:27

## 2019-02-19 RX ADMIN — GABAPENTIN 300 MILLIGRAM(S): 400 CAPSULE ORAL at 06:15

## 2019-02-19 RX ADMIN — OXYCODONE AND ACETAMINOPHEN 1 TABLET(S): 5; 325 TABLET ORAL at 20:42

## 2019-02-19 RX ADMIN — SODIUM CHLORIDE 75 MILLILITER(S): 9 INJECTION INTRAMUSCULAR; INTRAVENOUS; SUBCUTANEOUS at 20:28

## 2019-02-19 NOTE — PROGRESS NOTE ADULT - SUBJECTIVE AND OBJECTIVE BOX
Patient is a 78y old  Female who presents with a chief complaint of Cellulitis (18 Feb 2019 16:59)      SUBJECTIVE / OVERNIGHT EVENTS:  awake alert, feels depressed.  no SI/HI.  refused procedure and some meds.  encouraged the pt. the aide is encouraging her best as well.  no cp, no sob.  no eating much.  Aide states "not peeing much"  bladder scan ordered.       Vital Signs Last 24 Hrs  T(C): 37.3 (19 Feb 2019 09:08), Max: 37.3 (19 Feb 2019 09:08)  T(F): 99.1 (19 Feb 2019 09:08), Max: 99.1 (19 Feb 2019 09:08)  HR: 85 (19 Feb 2019 09:08) (79 - 87)  BP: 100/67 (19 Feb 2019 09:08) (100/67 - 114/66)  BP(mean): --  RR: 18 (19 Feb 2019 09:08) (16 - 18)  SpO2: 93% (19 Feb 2019 09:08) (93% - 96%)  I&O's Summary    18 Feb 2019 07:01  -  19 Feb 2019 07:00  --------------------------------------------------------  IN: 400 mL / OUT: 300 mL / NET: 100 mL    19 Feb 2019 07:01  -  19 Feb 2019 13:31  --------------------------------------------------------  IN: 200 mL / OUT: 0 mL / NET: 200 mL      PHYSICAL EXAM:  GENERAL: NAD, Comfortable, obese lady, lying in bed, on room air  HEAD:  Atraumatic, Normocephalic  EYES: EOMI, PERRLA, conjunctiva and sclera clear  NECK: Supple, No JVD  CHEST/LUNG: mild decrease breath sounds bilaterally; No wheeze   HEART: Regular rate and rhythm; No murmurs, rubs, or gallops  ABDOMEN: Soft, Nontender, Nondistended; Bowel sounds present  Neuro: AAO x 2, no focal deficit  EXTREMITIES:  2+ Peripheral Pulses, No clubbing, cyanosis.  1+ nonpitting edema LE  Back: sacral and thigh wound. +wound vac in place  SKIN: No rashes or lesions    LABS:                        8.5    12.0  )-----------( 289      ( 19 Feb 2019 10:10 )             26.6     02-19    137  |  105  |  34<H>  ----------------------------<  111<H>  4.8   |  19<L>  |  1.55<H>    Ca    9.2      19 Feb 2019 09:59        CAPILLARY BLOOD GLUCOSE                RADIOLOGY & ADDITIONAL TESTS:    Imaging Personally Reviewed:  [x] YES  [ ] NO    Consultant(s) Notes Reviewed:  [x] YES  [ ] NO      MEDICATIONS  (STANDING):  amLODIPine   Tablet 10 milliGRAM(s) Oral daily  ascorbic acid 500 milliGRAM(s) Oral daily  aspirin enteric coated 81 milliGRAM(s) Oral daily  atorvastatin 10 milliGRAM(s) Oral at bedtime  bisacodyl 5 milliGRAM(s) Oral at bedtime  calcium carbonate 1250 mG  + Vitamin D (OsCal 500 + D) 1 Tablet(s) Oral three times a day  cholecalciferol 1000 Unit(s) Oral daily  cycloSPORINE  (SandIMMUNE) 200 milliGRAM(s) Oral two times a day  escitalopram 5 milliGRAM(s) Oral daily  folic acid 1 milliGRAM(s) Oral daily  gabapentin 300 milliGRAM(s) Oral two times a day  heparin  Injectable 5000 Unit(s) SubCutaneous every 8 hours  melatonin 3 milliGRAM(s) Oral at bedtime  metoprolol succinate ER 25 milliGRAM(s) Oral daily  pantoprazole    Tablet 40 milliGRAM(s) Oral before breakfast  polyethylene glycol 3350 17 Gram(s) Oral daily  vancomycin  IVPB 1000 milliGRAM(s) IV Intermittent every 24 hours  vancomycin  IVPB      vitamin B complex with vitamin C 1 Tablet(s) Oral daily    MEDICATIONS  (PRN):  acetaminophen   Tablet .. 650 milliGRAM(s) Oral every 6 hours PRN Mild Pain (1 - 3)  oxyCODONE    5 mG/acetaminophen 325 mG 1 Tablet(s) Oral every 6 hours PRN Severe Pain (7 - 10)      Care Discussed with Consultants/Other Providers [x] YES  [ ] NO    HEALTH ISSUES - PROBLEM Dx:  Pyoderma gangrenosum: Pyoderma gangrenosum  Depression, unspecified depression type: Depression, unspecified depression type  Medication management: Medication management  Gastroesophageal reflux disease, esophagitis presence not specified: Gastroesophageal reflux disease, esophagitis presence not specified  Aortic stenosis, severe: Aortic stenosis, severe  Coronary artery disease involving native coronary artery of native heart without angina pectoris: Coronary artery disease involving native coronary artery of native heart without angina pectoris  Altered mental status, unspecified altered mental status type: Altered mental status, unspecified altered mental status type  Cellulitis of leg, right: Cellulitis of leg, right

## 2019-02-19 NOTE — PROGRESS NOTE ADULT - PROBLEM SELECTOR PLAN 1
c/w vancomycin, monitor vanc trough.  initially on Meropenem but later discontinued after culture data.  blood cultures from 2/14 are negative.  ID f/u appreciated  cyclosporine temporarily held on admission, but now restarted. d/w ID, okay to restart. Previously seen by Dermatology. reconsulted but states no urgent issues. To c/w steroid sparing agent cyclosporine.   completed chronic steroids taper for pyoderma.   (she was on Prednisone 5 mg qdaily. doing well off steroids)  Per orthopedic surgery can be OOB primarily to wheelchair and can 50% weight bear on R leg if straight in immobilizer.  ID f/u for Coags Neg staph bacteremia. wound care consult and wound vac management appreciated.  LE edema likely due to IVF. will d/c IVF. Cr stable. PRN IVF if needed  LE dopplers to r/o DVT  *** TTE pending and likely will need ERIKA to r/o endocarditis given bacteremia  pt has been refusing procedures, encouraged her to proceed. will need psyc follow up if she refuses again.

## 2019-02-19 NOTE — PROGRESS NOTE ADULT - SUBJECTIVE AND OBJECTIVE BOX
CC: f/u for coag negative staph bacteremia    Patient reports no change in mild L flank pain    REVIEW OF SYSTEMS:  All other review of systems negative (Comprehensive ROS)    Antimicrobials Day # 7  vancomycin  IVPB 1000 milliGRAM(s) IV Intermittent every 24 hours  vancomycin  IVPB        Other Medications Reviewed    Vital Signs Last 24 Hrs  T(F): 99.1 (19 Feb 2019 09:08), Max: 99.1 (19 Feb 2019 09:08)  HR: 85 (19 Feb 2019 09:08) (79 - 87)  BP: 100/67 (19 Feb 2019 09:08) (100/67 - 114/66)  BP(mean): --  RR: 18 (19 Feb 2019 09:08) (16 - 18)  SpO2: 93% (19 Feb 2019 09:08) (93% - 96%)    PHYSICAL EXAM:  General: alert, no acute distress  Eyes:  anicteric, no conjunctival injection, no discharge  Oropharynx: no lesions or injection 	  Neck: supple, without adenopathy  Lungs: clear to auscultation  Heart: regular rate and rhythm; no murmur, rubs or gallops  Abdomen: soft, nondistended, nontender, without mass or organomegaly  Skin: L flank wounds dressed; R hip VAC, R thigh and knee/leg wounds healed  Extremities: mild edema  Neurologic: alert, moves all extremities    LAB RESULTS:                        8.5    12.0  )-----------( 289      ( 19 Feb 2019 10:10 )             26.6   02-19    137  |  105  |  34<H>  ----------------------------<  111<H>  4.8   |  19<L>  |  1.55<H>    Ca    9.2      19 Feb 2019 09:59      Vancomycin Level, Trough (02.16.19 @ 10:11)    Vancomycin Level, Trough: 18.9    MICROBIOLOGY:  RECENT CULTURES:  02-14 @ 12:00 .Blood Blood     No growth to date.    02-12 @ 15:55 .Blood Blood-Peripheral Blood Culture PCR  Staphylococcus epidermidis    Growth in aerobic and anaerobic bottles: Staphylococcus epidermidis    Culture - Blood (02.12.19 @ 15:55)    Gram Stain:   Growth in aerobic bottle: Gram Positive Cocci in Clusters    Specimen Source: .Blood Blood-Venous    Culture Results:   Growth in aerobic bottle: Staphylococcus epidermidis      RADIOLOGY REVIEWED

## 2019-02-19 NOTE — PROGRESS NOTE ADULT - ASSESSMENT
79 yo female with history of PMR, Pyoderma Gangrenosum (steroid taper and cyclosporine), s/p TAVR and explant of infected R Knee for MRSA (doxy suppression, spacer) admitted with fever to 102 and leukocytosis.  She also has been treated for appendicitis in the past year and PICC related coag negative staph bacteremia.  Latest clinical picture suggestive of infection with fever and leukocytosis, although exam somewhat non focal.  By report her wounds have been improving  No obvious primary source of S epi at present- raises concern of either heart valve infection or PPM infection- may have to consider more extended course, eg, 4 wks IV Vanco  Her isolate is resistant to beta lactams and most second line agents, Vanco EDWARD on high side, 4.  F/u blood cultures 2/14 are negative; decreased fever and decreased wbc count    Suggest:  Continue vanco 1 gram daily, latest trough acceptable  Await ECHO, ?ERIKA  Follow temps and CBC/diff   Follow Vanco trough  Local wound care  d/w aide at bedside

## 2019-02-19 NOTE — PROGRESS NOTE ADULT - ATTENDING COMMENTS
Sammy Mendosa will be covering for me starting 2/20/19. He can be reached at  if needed.     - Dr. DENISSE Palumbo ([facility])  - [phone]

## 2019-02-20 LAB
ANION GAP SERPL CALC-SCNC: 11 MMOL/L — SIGNIFICANT CHANGE UP (ref 5–17)
BUN SERPL-MCNC: 34 MG/DL — HIGH (ref 7–23)
CALCIUM SERPL-MCNC: 9.4 MG/DL — SIGNIFICANT CHANGE UP (ref 8.4–10.5)
CHLORIDE SERPL-SCNC: 107 MMOL/L — SIGNIFICANT CHANGE UP (ref 96–108)
CO2 SERPL-SCNC: 20 MMOL/L — LOW (ref 22–31)
CREAT SERPL-MCNC: 1.42 MG/DL — HIGH (ref 0.5–1.3)
GLUCOSE SERPL-MCNC: 109 MG/DL — HIGH (ref 70–99)
HCT VFR BLD CALC: 26.8 % — LOW (ref 34.5–45)
HGB BLD-MCNC: 7.4 G/DL — LOW (ref 11.5–15.5)
MCHC RBC-ENTMCNC: 26.4 PG — LOW (ref 27–34)
MCHC RBC-ENTMCNC: 27.6 GM/DL — LOW (ref 32–36)
MCV RBC AUTO: 95.7 FL — SIGNIFICANT CHANGE UP (ref 80–100)
PLATELET # BLD AUTO: 316 K/UL — SIGNIFICANT CHANGE UP (ref 150–400)
POTASSIUM SERPL-MCNC: 4.6 MMOL/L — SIGNIFICANT CHANGE UP (ref 3.5–5.3)
POTASSIUM SERPL-SCNC: 4.6 MMOL/L — SIGNIFICANT CHANGE UP (ref 3.5–5.3)
RBC # BLD: 2.8 M/UL — LOW (ref 3.8–5.2)
RBC # FLD: 17.2 % — HIGH (ref 10.3–14.5)
SODIUM SERPL-SCNC: 138 MMOL/L — SIGNIFICANT CHANGE UP (ref 135–145)
VANCOMYCIN TROUGH SERPL-MCNC: 25.6 UG/ML — CRITICAL HIGH (ref 10–20)
WBC # BLD: 12.19 K/UL — HIGH (ref 3.8–10.5)
WBC # FLD AUTO: 12.19 K/UL — HIGH (ref 3.8–10.5)

## 2019-02-20 PROCEDURE — 93970 EXTREMITY STUDY: CPT | Mod: 26

## 2019-02-20 RX ADMIN — Medication 650 MILLIGRAM(S): at 13:49

## 2019-02-20 RX ADMIN — Medication 1 TABLET(S): at 13:49

## 2019-02-20 RX ADMIN — Medication 650 MILLIGRAM(S): at 06:31

## 2019-02-20 RX ADMIN — OXYCODONE AND ACETAMINOPHEN 1 TABLET(S): 5; 325 TABLET ORAL at 09:54

## 2019-02-20 RX ADMIN — HEPARIN SODIUM 5000 UNIT(S): 5000 INJECTION INTRAVENOUS; SUBCUTANEOUS at 13:49

## 2019-02-20 RX ADMIN — Medication 650 MILLIGRAM(S): at 14:19

## 2019-02-20 RX ADMIN — CYCLOSPORINE 200 MILLIGRAM(S): 100 CAPSULE ORAL at 09:54

## 2019-02-20 RX ADMIN — GABAPENTIN 300 MILLIGRAM(S): 400 CAPSULE ORAL at 17:56

## 2019-02-20 RX ADMIN — PANTOPRAZOLE SODIUM 40 MILLIGRAM(S): 20 TABLET, DELAYED RELEASE ORAL at 06:25

## 2019-02-20 RX ADMIN — ATORVASTATIN CALCIUM 10 MILLIGRAM(S): 80 TABLET, FILM COATED ORAL at 22:14

## 2019-02-20 RX ADMIN — POLYETHYLENE GLYCOL 3350 17 GRAM(S): 17 POWDER, FOR SOLUTION ORAL at 13:48

## 2019-02-20 RX ADMIN — GABAPENTIN 300 MILLIGRAM(S): 400 CAPSULE ORAL at 06:25

## 2019-02-20 RX ADMIN — Medication 1 TABLET(S): at 06:25

## 2019-02-20 RX ADMIN — Medication 25 MILLIGRAM(S): at 06:25

## 2019-02-20 RX ADMIN — Medication 1 MILLIGRAM(S): at 13:49

## 2019-02-20 RX ADMIN — Medication 1 TABLET(S): at 13:48

## 2019-02-20 RX ADMIN — OXYCODONE AND ACETAMINOPHEN 1 TABLET(S): 5; 325 TABLET ORAL at 10:24

## 2019-02-20 RX ADMIN — AMLODIPINE BESYLATE 10 MILLIGRAM(S): 2.5 TABLET ORAL at 06:25

## 2019-02-20 RX ADMIN — Medication 81 MILLIGRAM(S): at 13:49

## 2019-02-20 RX ADMIN — Medication 5 MILLIGRAM(S): at 22:14

## 2019-02-20 RX ADMIN — Medication 1 TABLET(S): at 22:14

## 2019-02-20 RX ADMIN — Medication 500 MILLIGRAM(S): at 13:49

## 2019-02-20 RX ADMIN — CYCLOSPORINE 200 MILLIGRAM(S): 100 CAPSULE ORAL at 22:14

## 2019-02-20 RX ADMIN — Medication 650 MILLIGRAM(S): at 07:01

## 2019-02-20 RX ADMIN — ESCITALOPRAM OXALATE 5 MILLIGRAM(S): 10 TABLET, FILM COATED ORAL at 13:48

## 2019-02-20 RX ADMIN — Medication 1000 UNIT(S): at 13:49

## 2019-02-20 RX ADMIN — HEPARIN SODIUM 5000 UNIT(S): 5000 INJECTION INTRAVENOUS; SUBCUTANEOUS at 22:14

## 2019-02-20 RX ADMIN — HEPARIN SODIUM 5000 UNIT(S): 5000 INJECTION INTRAVENOUS; SUBCUTANEOUS at 06:25

## 2019-02-20 RX ADMIN — SODIUM CHLORIDE 75 MILLILITER(S): 9 INJECTION INTRAMUSCULAR; INTRAVENOUS; SUBCUTANEOUS at 06:24

## 2019-02-20 NOTE — PROGRESS NOTE ADULT - ASSESSMENT
79 yo female with history of PMR, Pyoderma Gangrenosum (steroid taper and cyclosporine), s/p TAVR and explant of infected R Knee for MRSA (doxy suppression, spacer) admitted with fever to 102 and leukocytosis.  She also has been treated for appendicitis in the past year and PICC related coag negative staph bacteremia.  Latest clinical picture suggestive of infection with fever and leukocytosis, although exam somewhat non focal.  By report her wounds have been improving  No obvious primary source of S epi at present- raises concern of either heart valve infection or PPM infection- may have to consider more extended course, eg, 4 wks IV Vanco  Her isolate is resistant to beta lactams and most second line agents, Vanco EDWARD on high side, 4.  F/u blood cultures 2/14 are negative; decreased fever and decreased wbc count  she remains reluctant to go for tests as per hospitalist  Suggest:  Hold vanco, will check trough in AM restart at lower dose when level less than 15  Await ECHO, ?ERIKA  Follow temps and CBC/diff   Local wound care  If she refuses tests wecould offer a 1 month course of vanco and hope that this is adequate  She would be a poor candidate for any surgical intervention

## 2019-02-20 NOTE — PROGRESS NOTE ADULT - SUBJECTIVE AND OBJECTIVE BOX
some knee discomfort; getting VAC changed this morning    Vital Signs Last 24 Hrs  T(C): 36.6 (20 Feb 2019 09:02), Max: 37.3 (19 Feb 2019 22:27)  T(F): 97.9 (20 Feb 2019 09:02), Max: 99.1 (19 Feb 2019 22:27)  HR: 93 (20 Feb 2019 09:02) (86 - 93)  BP: 106/67 (20 Feb 2019 09:02) (106/67 - 123/73)  BP(mean): --  RR: 18 (20 Feb 2019 09:02) (18 - 18)  SpO2: 93% (20 Feb 2019 09:02) (90% - 95%)      GENERAL: NAD, lying in bed  HEAD:  Atraumatic, Normocephalic  EYES: EOMI, PERRLA, conjunctiva and sclera clear  NECK: Supple, No JVD  CHEST/LUNG: mild decrease breath sounds bilaterally; No wheeze   HEART: Regular rate and rhythm; No murmurs, rubs, or gallops  ABDOMEN: Soft, Nontender, Nondistended; Bowel sounds present  Neuro: AAO x 2, no focal deficit  EXTREMITIES:  2+ Peripheral Pulses, No clubbing, cyanosis.  1+ nonpitting edema LE  Back: sacral and thigh wound. +wound vac in place  SKIN: No rashes or lesions    LABS:                        8.5    12.0  )-----------( 289      ( 19 Feb 2019 10:10 )             26.6     02-20    138  |  107  |  34<H>  ----------------------------<  109<H>  4.6   |  20<L>  |  1.42<H>    Ca    9.4      20 Feb 2019 08:32        CAPILLARY BLOOD GLUCOSE

## 2019-02-20 NOTE — PROGRESS NOTE ADULT - SUBJECTIVE AND OBJECTIVE BOX
CC: f/u for coag negative staph bacteremia    Patient reports: nonspecific aches and pains, appreciate medical f/u, reluctance for tests noted.Vanco on hold for elevated level    REVIEW OF SYSTEMS:  All other review of systems negative (Comprehensive ROS)    Antimicrobials Day #  :day 8  vancomycin  IVPB 1000 milliGRAM(s) IV Intermittent every 24 hours  vancomycin  IVPB        Other Medications Reviewed    T(F): 97.9 (02-20-19 @ 09:02), Max: 99.1 (02-19-19 @ 22:27)  HR: 93 (02-20-19 @ 09:02)  BP: 106/67 (02-20-19 @ 09:02)  RR: 18 (02-20-19 @ 09:02)  SpO2: 93% (02-20-19 @ 09:02)  Wt(kg): --    PHYSICAL EXAM:  General: alert, no acute distress  Eyes:  anicteric, no conjunctival injection, no discharge  Oropharynx: no lesions or injection 	  Neck: supple, without adenopathy  Lungs: clear to auscultation  Heart: regular rate and rhythm; no murmur, rubs or gallops  Abdomen: soft, nondistended, nontender, without mass or organomegaly  Skin: no lesions  Extremities: no clubbing, cyanosis, or edema  Neurologic: alert, oriented, moves all extremities    LAB RESULTS:                        8.5    12.0  )-----------( 289      ( 19 Feb 2019 10:10 )             26.6     02-20    138  |  107  |  34<H>  ----------------------------<  109<H>  4.6   |  20<L>  |  1.42<H>    Ca    9.4      20 Feb 2019 08:32    vanco trough 25.6      MICROBIOLOGY:  RECENT CULTURES:      RADIOLOGY REVIEWED:  < from: [facility] Duplex Lower Ext Vein Scan, Thaddeus (02.20.19 @ 12:32) >  IMPRESSION: Present on this examination is partially occlusive DVT   affecting the left common femoral vein.    There has been interval resolution of the previously seen right lower   extremity DVT.    MARSHA Champion notified    < end of copied text >

## 2019-02-20 NOTE — CHART NOTE - NSCHARTNOTEFT_GEN_A_CORE
Called by Ultrasound tech in regards to results of dopplers: Present on this examination is partially occlusive DVT affecting the left common femoral vein. There has been interval resolution of the previously seen right lower extremity DVT. D/w Dr. Bray, pt with hx of IVC filter. Will continue to monitor.   OTTO Mendez   [id number]

## 2019-02-21 LAB
ANION GAP SERPL CALC-SCNC: 13 MMOL/L — SIGNIFICANT CHANGE UP (ref 5–17)
BUN SERPL-MCNC: 34 MG/DL — HIGH (ref 7–23)
CALCIUM SERPL-MCNC: 9.3 MG/DL — SIGNIFICANT CHANGE UP (ref 8.4–10.5)
CHLORIDE SERPL-SCNC: 108 MMOL/L — SIGNIFICANT CHANGE UP (ref 96–108)
CO2 SERPL-SCNC: 19 MMOL/L — LOW (ref 22–31)
CREAT SERPL-MCNC: 1.28 MG/DL — SIGNIFICANT CHANGE UP (ref 0.5–1.3)
GLUCOSE SERPL-MCNC: 97 MG/DL — SIGNIFICANT CHANGE UP (ref 70–99)
HCT VFR BLD CALC: 26.8 % — LOW (ref 34.5–45)
HGB BLD-MCNC: 7.6 G/DL — LOW (ref 11.5–15.5)
MCHC RBC-ENTMCNC: 26.7 PG — LOW (ref 27–34)
MCHC RBC-ENTMCNC: 28.4 GM/DL — LOW (ref 32–36)
MCV RBC AUTO: 94 FL — SIGNIFICANT CHANGE UP (ref 80–100)
PLATELET # BLD AUTO: 324 K/UL — SIGNIFICANT CHANGE UP (ref 150–400)
POTASSIUM SERPL-MCNC: 4.7 MMOL/L — SIGNIFICANT CHANGE UP (ref 3.5–5.3)
POTASSIUM SERPL-SCNC: 4.7 MMOL/L — SIGNIFICANT CHANGE UP (ref 3.5–5.3)
RBC # BLD: 2.85 M/UL — LOW (ref 3.8–5.2)
RBC # FLD: 17.1 % — HIGH (ref 10.3–14.5)
SODIUM SERPL-SCNC: 140 MMOL/L — SIGNIFICANT CHANGE UP (ref 135–145)
VANCOMYCIN FLD-MCNC: 20.6 UG/ML — SIGNIFICANT CHANGE UP
WBC # BLD: 14.59 K/UL — HIGH (ref 3.8–10.5)
WBC # FLD AUTO: 14.59 K/UL — HIGH (ref 3.8–10.5)

## 2019-02-21 RX ORDER — HYDROMORPHONE HYDROCHLORIDE 2 MG/ML
0.5 INJECTION INTRAMUSCULAR; INTRAVENOUS; SUBCUTANEOUS ONCE
Qty: 0 | Refills: 0 | Status: DISCONTINUED | OUTPATIENT
Start: 2019-02-21 | End: 2019-02-21

## 2019-02-21 RX ORDER — OXYCODONE AND ACETAMINOPHEN 5; 325 MG/1; MG/1
1 TABLET ORAL EVERY 4 HOURS
Qty: 0 | Refills: 0 | Status: DISCONTINUED | OUTPATIENT
Start: 2019-02-21 | End: 2019-02-28

## 2019-02-21 RX ORDER — OXYCODONE AND ACETAMINOPHEN 5; 325 MG/1; MG/1
1 TABLET ORAL EVERY 6 HOURS
Qty: 0 | Refills: 0 | Status: DISCONTINUED | OUTPATIENT
Start: 2019-02-21 | End: 2019-02-21

## 2019-02-21 RX ADMIN — CYCLOSPORINE 200 MILLIGRAM(S): 100 CAPSULE ORAL at 10:05

## 2019-02-21 RX ADMIN — Medication 650 MILLIGRAM(S): at 07:42

## 2019-02-21 RX ADMIN — OXYCODONE AND ACETAMINOPHEN 1 TABLET(S): 5; 325 TABLET ORAL at 10:29

## 2019-02-21 RX ADMIN — HEPARIN SODIUM 5000 UNIT(S): 5000 INJECTION INTRAVENOUS; SUBCUTANEOUS at 06:25

## 2019-02-21 RX ADMIN — Medication 1 TABLET(S): at 12:20

## 2019-02-21 RX ADMIN — ESCITALOPRAM OXALATE 5 MILLIGRAM(S): 10 TABLET, FILM COATED ORAL at 12:19

## 2019-02-21 RX ADMIN — Medication 81 MILLIGRAM(S): at 12:19

## 2019-02-21 RX ADMIN — HEPARIN SODIUM 5000 UNIT(S): 5000 INJECTION INTRAVENOUS; SUBCUTANEOUS at 21:12

## 2019-02-21 RX ADMIN — Medication 1 TABLET(S): at 15:04

## 2019-02-21 RX ADMIN — OXYCODONE AND ACETAMINOPHEN 1 TABLET(S): 5; 325 TABLET ORAL at 09:59

## 2019-02-21 RX ADMIN — Medication 1 TABLET(S): at 06:25

## 2019-02-21 RX ADMIN — Medication 1 MILLIGRAM(S): at 12:19

## 2019-02-21 RX ADMIN — Medication 500 MILLIGRAM(S): at 12:19

## 2019-02-21 RX ADMIN — Medication 5 MILLIGRAM(S): at 21:12

## 2019-02-21 RX ADMIN — Medication 650 MILLIGRAM(S): at 15:04

## 2019-02-21 RX ADMIN — Medication 650 MILLIGRAM(S): at 15:34

## 2019-02-21 RX ADMIN — Medication 650 MILLIGRAM(S): at 08:12

## 2019-02-21 RX ADMIN — GABAPENTIN 300 MILLIGRAM(S): 400 CAPSULE ORAL at 21:11

## 2019-02-21 RX ADMIN — ATORVASTATIN CALCIUM 10 MILLIGRAM(S): 80 TABLET, FILM COATED ORAL at 21:12

## 2019-02-21 RX ADMIN — HYDROMORPHONE HYDROCHLORIDE 0.5 MILLIGRAM(S): 2 INJECTION INTRAMUSCULAR; INTRAVENOUS; SUBCUTANEOUS at 16:30

## 2019-02-21 RX ADMIN — GABAPENTIN 300 MILLIGRAM(S): 400 CAPSULE ORAL at 06:30

## 2019-02-21 RX ADMIN — CYCLOSPORINE 200 MILLIGRAM(S): 100 CAPSULE ORAL at 21:12

## 2019-02-21 RX ADMIN — HYDROMORPHONE HYDROCHLORIDE 0.5 MILLIGRAM(S): 2 INJECTION INTRAMUSCULAR; INTRAVENOUS; SUBCUTANEOUS at 17:00

## 2019-02-21 RX ADMIN — POLYETHYLENE GLYCOL 3350 17 GRAM(S): 17 POWDER, FOR SOLUTION ORAL at 12:19

## 2019-02-21 RX ADMIN — PANTOPRAZOLE SODIUM 40 MILLIGRAM(S): 20 TABLET, DELAYED RELEASE ORAL at 06:24

## 2019-02-21 RX ADMIN — AMLODIPINE BESYLATE 10 MILLIGRAM(S): 2.5 TABLET ORAL at 06:24

## 2019-02-21 RX ADMIN — Medication 1 TABLET(S): at 21:11

## 2019-02-21 RX ADMIN — Medication 25 MILLIGRAM(S): at 06:24

## 2019-02-21 RX ADMIN — Medication 1000 UNIT(S): at 12:19

## 2019-02-21 RX ADMIN — HEPARIN SODIUM 5000 UNIT(S): 5000 INJECTION INTRAVENOUS; SUBCUTANEOUS at 15:05

## 2019-02-21 NOTE — PROGRESS NOTE ADULT - SUBJECTIVE AND OBJECTIVE BOX
CC: f/u for coag negative staph bacteremia    Patient reports similar L flank pain, body aches    REVIEW OF SYSTEMS:  All other review of systems negative (Comprehensive ROS)    Antimicrobials Day # 9- vancomycin on hold  Medications Reviewed    Vital Signs Last 24 Hrs  T(F): 98.8 (21 Feb 2019 06:48), Max: 98.8 (21 Feb 2019 06:48)  HR: 90 (21 Feb 2019 06:48) (74 - 90)  BP: 111/73 (21 Feb 2019 06:48) (111/73 - 120/70)  BP(mean): --  RR: 18 (21 Feb 2019 06:48) (18 - 18)  SpO2: 95% (21 Feb 2019 06:48) (92% - 95%)    PHYSICAL EXAM:  General: alert, no acute distress  Eyes:  anicteric, no conjunctival injection, no discharge  Oropharynx: no lesions or injection 	  Neck: supple, without adenopathy  Lungs: clear to auscultation  Heart: regular rate and rhythm; no murmur, rubs or gallops  Abdomen: soft, nondistended, nontender, without mass or organomegaly  Skin: L flank wounds dressed; R hip VAC, R thigh and knee/leg wounds healed  Extremities: mild edema  Neurologic: alert, moves all extremities    LAB RESULTS:                        7.4    12.19 )-----------( 316      ( 20 Feb 2019 17:14 )             26.8   02-21    140  |  108  |  34<H>  ----------------------------<  97  4.7   |  19<L>  |  1.28    Ca    9.3      21 Feb 2019 07:23    Vancomycin Level, Random (02.21.19 @ 07:23)    Vancomycin Level, Random: 20.6    MICROBIOLOGY:  RECENT CULTURES:  Culture - Blood in AM (02.14.19 @ 12:00)    Specimen Source: .Blood Blood    Culture Results:   No growth at 5 days.    RADIOLOGY REVIEWED:  VA Duplex Lower Ext Vein Scan, Bilat (02.20.19 @ 12:32) >  Present on this examination is partially occlusive DVT   affecting the left common femoral vein.    There has been interval resolution of the previously seen right lower   extremity DVT.

## 2019-02-21 NOTE — PROGRESS NOTE ADULT - SUBJECTIVE AND OBJECTIVE BOX
She got upset when I asked her how her  was doing    Vital Signs Last 24 Hrs  T(C): 37.1 (21 Feb 2019 06:48), Max: 37.1 (21 Feb 2019 06:48)  T(F): 98.8 (21 Feb 2019 06:48), Max: 98.8 (21 Feb 2019 06:48)  HR: 90 (21 Feb 2019 06:48) (74 - 90)  BP: 111/73 (21 Feb 2019 06:48) (111/73 - 120/70)  BP(mean): --  RR: 18 (21 Feb 2019 06:48) (18 - 18)  SpO2: 95% (21 Feb 2019 06:48) (92% - 95%)    GENERAL: NAD, lying in bed  HEAD:  Atraumatic, Normocephalic  EYES: EOMI, PERRLA, conjunctiva and sclera clear  NECK: Supple, No JVD  CHEST/LUNG: mild decrease breath sounds bilaterally; No wheeze   HEART: Regular rate and rhythm; No murmurs, rubs, or gallops  ABDOMEN: Soft, Nontender, Nondistended; Bowel sounds present  Neuro: AAO x 2, no focal deficit  EXTREMITIES:  2+ Peripheral Pulses, No clubbing, cyanosis.  1+ nonpitting edema LE  Back: sacral and thigh wound. +wound vac in place  SKIN: No rashes or lesions    LABS:                        7.4    12.19 )-----------( 316      ( 20 Feb 2019 17:14 )             26.8     02-21    140  |  108  |  34<H>  ----------------------------<  97  4.7   |  19<L>  |  1.28    Ca    9.3      21 Feb 2019 07:23        CAPILLARY BLOOD GLUCOSE

## 2019-02-21 NOTE — PROGRESS NOTE ADULT - ASSESSMENT
77 yo female with history of PMR, Pyoderma Gangrenosum (steroid taper and cyclosporine), s/p TAVR and explant of infected R Knee for MRSA (doxy suppression, spacer) admitted with fever to 102 and leukocytosis- S epi in 3 of 4 adm Bld Cxs  She also has been treated for appendicitis in the past year and PICC related S epi bacteremia (Sept '18)  Latest clinical picture suggestive of infection with fever and leukocytosis  By report her wounds have been improving  No obvious primary source for S epi at present- raises concern of either heart valve infection or PPM infection- ?seeding from prior episode  Her latest isolate is resistant to beta lactams and most second line agents, Vanco EDWARD 4.  F/u blood cultures 2/14 are negative; afebrile and decreased wbc count  New L LE DVT noted (IVC filter already in place)  Awaiting ECHO    Suggest:  Vanco trough still elevated, repeat level for AM- likely restart Vanco 1 g q 48  With prospect of endovascular source, will need 4-6 wk course- hope this is adequate, recognizing high risk for recurrence- will need PICC  Could consider addition of Rifampin, but I am not sure pt will tolerate it, drug-drug interactions; will at least await ECHO  Await ECHO; she would be a poor candidate for any surgical intervention, thus, ERIKA probably would not   Follow temps and CBC/diff   Local wound care  D/w NP

## 2019-02-21 NOTE — PROGRESS NOTE ADULT - SUBJECTIVE AND OBJECTIVE BOX
SUBJECTIVE: Pt seen, chart reviewed.  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  This was discussed with the medical team    Allergies    amoxicillin (Other)    Intolerances    IV Contrast (Flushing (Skin); Nausea)      aspirin enteric coated 81 milliGRAM(s) Oral daily  heparin  Injectable 5000 Unit(s) SubCutaneous every 8 hours      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  Artificial cardiac pacemaker: 12/25/17  H/O cardiac catheterization: 11/29/16 HERBERT placed on LAD  H/O endoscopy: with dilatation of esophageal stricture 2013  S/P cataract surgery: x2; 3-4yr ago  S/P gastroplasty: 28 yrs ago  S/P cholecystectomy: more than 15 years ago  S/P total knee replacement: left, 15yr ago  S/P total knee replacement: right, 12yr ago  S/P hysterectomy: 24yr ago      HEALTH ISSUES - PROBLEM Dx:  Acute kidney failure: Acute kidney failure  Pyoderma gangrenosum: Pyoderma gangrenosum  Depression, unspecified depression type: Depression, unspecified depression type  Medication management: Medication management  Gastroesophageal reflux disease, esophagitis presence not specified: Gastroesophageal reflux disease, esophagitis presence not specified  Aortic stenosis, severe: Aortic stenosis, severe  Coronary artery disease involving native coronary artery of native heart without angina pectoris: Coronary artery disease involving native coronary artery of native heart without angina pectoris  Altered mental status, unspecified altered mental status type: Altered mental status, unspecified altered mental status type  Cellulitis of leg, right: Cellulitis of leg, right          FAMILY HISTORY:  No pertinent family history in first degree relatives      Physical Exam:  Vital Signs Last 24 Hrs  T(C): 37.1 (21 Feb 2019 14:11), Max: 37.1 (21 Feb 2019 06:48)  T(F): 98.7 (21 Feb 2019 14:11), Max: 98.8 (21 Feb 2019 06:48)  HR: 95 (21 Feb 2019 14:11) (84 - 95)  BP: 104/70 (21 Feb 2019 14:11) (104/70 - 116/69)  BP(mean): --  RR: 18 (21 Feb 2019 14:11) (18 - 18)  SpO2: 93% (21 Feb 2019 14:11) (92% - 95%)    Patient cooperates poorly with examination , which requires that she be turned onto both lateral decubitus positions  Comments regarding DVT are noted- has IVC filter in place and in on both ASA and SQ heparin 3x /day  Wounds of right and left lateral thighs have improved with use of cyclosporin  These sites and left flank area - in a large skin fold, also have ecchymotic areas present  All wounds redressed with Aquacell, and VAC remains in place in right proximal thigh wound- also improved  No bone exposed in any wound    Patient appears more responsive today , and is reported to have had  a portion of her breakfast  Cyclosporin use noted    Findings will be relayed to         LABS:                        7.6    14.59 )-----------( 324      ( 21 Feb 2019 12:39 )             26.8           Outpatient follow up information provided- [phone] SUBJECTIVE: Pt seen, chart reviewed.  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  This was discussed with the medical team    Allergies    amoxicillin (Other)    Intolerances    IV Contrast (Flushing (Skin); Nausea)      aspirin enteric coated 81 milliGRAM(s) Oral daily  heparin  Injectable 5000 Unit(s) SubCutaneous every 8 hours      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  Artificial cardiac pacemaker: 12/25/17  H/O cardiac catheterization: 11/29/16 HERBERT placed on LAD  H/O endoscopy: with dilatation of esophageal stricture 2013  S/P cataract surgery: x2; 3-4yr ago  S/P gastroplasty: 28 yrs ago  S/P cholecystectomy: more than 15 years ago  S/P total knee replacement: left, 15yr ago  S/P total knee replacement: right, 12yr ago  S/P hysterectomy: 24yr ago      HEALTH ISSUES - PROBLEM Dx:  Acute kidney failure: Acute kidney failure  Pyoderma gangrenosum: Pyoderma gangrenosum  Depression, unspecified depression type: Depression, unspecified depression type  Medication management: Medication management  Gastroesophageal reflux disease, esophagitis presence not specified: Gastroesophageal reflux disease, esophagitis presence not specified  Aortic stenosis, severe: Aortic stenosis, severe  Coronary artery disease involving native coronary artery of native heart without angina pectoris: Coronary artery disease involving native coronary artery of native heart without angina pectoris  Altered mental status, unspecified altered mental status type: Altered mental status, unspecified altered mental status type  Cellulitis of leg, right: Cellulitis of leg, right          FAMILY HISTORY:  No pertinent family history in first degree relatives      Physical Exam:  Vital Signs Last 24 Hrs  T(C): 37.1 (21 Feb 2019 14:11), Max: 37.1 (21 Feb 2019 06:48)  T(F): 98.7 (21 Feb 2019 14:11), Max: 98.8 (21 Feb 2019 06:48)  HR: 95 (21 Feb 2019 14:11) (84 - 95)  BP: 104/70 (21 Feb 2019 14:11) (104/70 - 116/69)  BP(mean): --  RR: 18 (21 Feb 2019 14:11) (18 - 18)  SpO2: 93% (21 Feb 2019 14:11) (92% - 95%)    Patient cooperates poorly with examination , which requires that she be turned onto both lateral decubitus positions  Comments regarding DVT are noted- has IVC filter in place and in on both ASA and SQ heparin 3x /day  Wounds of right and left lateral thighs have improved with use of cyclosporin  These sites and left flank area - in a large skin fold, also have ecchymotic areas present  All wounds redressed with Aquacell, and VAC remains in place in right proximal thigh wound- also improved  No bone exposed in any wound    Wounds of bilateral anterior thighs and lower abdomen, including the original surgical site of the right knee, have all healed    ID notes appreciated    Patient appears more responsive today , and is reported to have take  a portion of her breakfast  She continues to protest vigorously during time of dressing changes and wound inspections    Cyclosporin use noted    Findings will be relayed to         LABS:                        7.6    14.59 )-----------( 324      ( 21 Feb 2019 12:39 )             26.8           Outpatient follow up information provided- [phone]

## 2019-02-22 LAB
ANION GAP SERPL CALC-SCNC: 13 MMOL/L — SIGNIFICANT CHANGE UP (ref 5–17)
BUN SERPL-MCNC: 36 MG/DL — HIGH (ref 7–23)
CALCIUM SERPL-MCNC: 9.2 MG/DL — SIGNIFICANT CHANGE UP (ref 8.4–10.5)
CHLORIDE SERPL-SCNC: 108 MMOL/L — SIGNIFICANT CHANGE UP (ref 96–108)
CO2 SERPL-SCNC: 17 MMOL/L — LOW (ref 22–31)
CREAT SERPL-MCNC: 1.43 MG/DL — HIGH (ref 0.5–1.3)
GLUCOSE SERPL-MCNC: 94 MG/DL — SIGNIFICANT CHANGE UP (ref 70–99)
HCT VFR BLD CALC: 26 % — LOW (ref 34.5–45)
HGB BLD-MCNC: 7.5 G/DL — LOW (ref 11.5–15.5)
MCHC RBC-ENTMCNC: 26.9 PG — LOW (ref 27–34)
MCHC RBC-ENTMCNC: 28.8 GM/DL — LOW (ref 32–36)
MCV RBC AUTO: 93.2 FL — SIGNIFICANT CHANGE UP (ref 80–100)
PLATELET # BLD AUTO: 330 K/UL — SIGNIFICANT CHANGE UP (ref 150–400)
POTASSIUM SERPL-MCNC: 5.1 MMOL/L — SIGNIFICANT CHANGE UP (ref 3.5–5.3)
POTASSIUM SERPL-SCNC: 5.1 MMOL/L — SIGNIFICANT CHANGE UP (ref 3.5–5.3)
RBC # BLD: 2.79 M/UL — LOW (ref 3.8–5.2)
RBC # FLD: 17.2 % — HIGH (ref 10.3–14.5)
SODIUM SERPL-SCNC: 138 MMOL/L — SIGNIFICANT CHANGE UP (ref 135–145)
VANCOMYCIN FLD-MCNC: 18.8 UG/ML — SIGNIFICANT CHANGE UP
WBC # BLD: 13.52 K/UL — HIGH (ref 3.8–10.5)
WBC # FLD AUTO: 13.52 K/UL — HIGH (ref 3.8–10.5)

## 2019-02-22 PROCEDURE — 99232 SBSQ HOSP IP/OBS MODERATE 35: CPT

## 2019-02-22 RX ORDER — VANCOMYCIN HCL 1 G
1000 VIAL (EA) INTRAVENOUS
Qty: 0 | Refills: 0 | Status: DISCONTINUED | OUTPATIENT
Start: 2019-02-22 | End: 2019-03-07

## 2019-02-22 RX ADMIN — Medication 1 TABLET(S): at 21:34

## 2019-02-22 RX ADMIN — CYCLOSPORINE 200 MILLIGRAM(S): 100 CAPSULE ORAL at 09:32

## 2019-02-22 RX ADMIN — GABAPENTIN 300 MILLIGRAM(S): 400 CAPSULE ORAL at 05:53

## 2019-02-22 RX ADMIN — Medication 1000 UNIT(S): at 12:57

## 2019-02-22 RX ADMIN — PANTOPRAZOLE SODIUM 40 MILLIGRAM(S): 20 TABLET, DELAYED RELEASE ORAL at 05:54

## 2019-02-22 RX ADMIN — Medication 1 TABLET(S): at 12:57

## 2019-02-22 RX ADMIN — HEPARIN SODIUM 5000 UNIT(S): 5000 INJECTION INTRAVENOUS; SUBCUTANEOUS at 13:00

## 2019-02-22 RX ADMIN — Medication 500 MILLIGRAM(S): at 12:58

## 2019-02-22 RX ADMIN — Medication 3 MILLIGRAM(S): at 21:34

## 2019-02-22 RX ADMIN — Medication 250 MILLIGRAM(S): at 14:56

## 2019-02-22 RX ADMIN — Medication 1 MILLIGRAM(S): at 12:57

## 2019-02-22 RX ADMIN — Medication 1 TABLET(S): at 13:00

## 2019-02-22 RX ADMIN — Medication 81 MILLIGRAM(S): at 12:57

## 2019-02-22 RX ADMIN — HEPARIN SODIUM 5000 UNIT(S): 5000 INJECTION INTRAVENOUS; SUBCUTANEOUS at 21:34

## 2019-02-22 RX ADMIN — AMLODIPINE BESYLATE 10 MILLIGRAM(S): 2.5 TABLET ORAL at 05:53

## 2019-02-22 RX ADMIN — POLYETHYLENE GLYCOL 3350 17 GRAM(S): 17 POWDER, FOR SOLUTION ORAL at 12:57

## 2019-02-22 RX ADMIN — Medication 5 MILLIGRAM(S): at 21:34

## 2019-02-22 RX ADMIN — Medication 1 TABLET(S): at 05:53

## 2019-02-22 RX ADMIN — Medication 25 MILLIGRAM(S): at 05:53

## 2019-02-22 RX ADMIN — ESCITALOPRAM OXALATE 5 MILLIGRAM(S): 10 TABLET, FILM COATED ORAL at 12:57

## 2019-02-22 RX ADMIN — HEPARIN SODIUM 5000 UNIT(S): 5000 INJECTION INTRAVENOUS; SUBCUTANEOUS at 05:54

## 2019-02-22 RX ADMIN — OXYCODONE AND ACETAMINOPHEN 1 TABLET(S): 5; 325 TABLET ORAL at 09:29

## 2019-02-22 RX ADMIN — CYCLOSPORINE 200 MILLIGRAM(S): 100 CAPSULE ORAL at 21:35

## 2019-02-22 RX ADMIN — GABAPENTIN 300 MILLIGRAM(S): 400 CAPSULE ORAL at 17:50

## 2019-02-22 NOTE — PROGRESS NOTE ADULT - PROBLEM SELECTOR PLAN 1
c/w vancomycin, monitor vanc trough.  initially on Meropenem but later discontinued after culture data.  blood cultures from 2/14 are negative.  ID f/u appreciated  cyclosporine temporarily held on admission, but now restarted. d/w ID, okay to restart. Previously seen by Dermatology. reconsulted but states no urgent issues. To c/w steroid sparing agent cyclosporine.   completed chronic steroids taper for pyoderma.   (she was on Prednisone 5 mg qdaily. doing well off steroids)  Per orthopedic surgery can be OOB primarily to wheelchair and can 50% weight bear on R leg if straight in immobilizer.  ID input appreciated  wound care consult and wound vac management appreciated.  LE edema likely due to IVF. will d/c IVF. Cr stable. PRN IVF if needed  LE dopplers to r/o DVT  *** TTE pending but most likely will not change fact that she will need PICC for 4-6 week of antibx

## 2019-02-22 NOTE — PROGRESS NOTE ADULT - SUBJECTIVE AND OBJECTIVE BOX
not in pain  poor po intake    NAD  RLE  bilateral thigh incisions dressed, +wound vac on right side holding suction  knee wound healed, no surrounding knee erythema and no drainage or fluctuance  5/5 ta/ehl/gcs  silt l4-s1  2+ dp pulse     ros: depressed, sad

## 2019-02-22 NOTE — PROGRESS NOTE ADULT - SUBJECTIVE AND OBJECTIVE BOX
CC: f/u for coag negative staph bacteremia    Patient reports no change L flank pain, body aches    REVIEW OF SYSTEMS:  All other review of systems negative (Comprehensive ROS)    Antimicrobials Day # 10 vancomycin   Medications Reviewed    Vital Signs Last 24 Hrs  T(F): 98.2 (22 Feb 2019 08:03), Max: 99.6 (22 Feb 2019 05:56)  HR: 84 (22 Feb 2019 08:03) (75 - 84)  BP: 112/58 (22 Feb 2019 08:03) (112/58 - 129/66)  BP(mean): --  RR: 17 (22 Feb 2019 08:03) (16 - 18)  SpO2: 92% (22 Feb 2019 08:03) (92% - 96%)    PHYSICAL EXAM:  General: alert, no acute distress  Eyes:  anicteric, no conjunctival injection, no discharge  Oropharynx: no lesions or injection 	  Neck: supple, without adenopathy  Lungs: clear to auscultation  Heart: regular rate and rhythm; no murmur, rubs or gallops  Abdomen: soft, nondistended, nontender, without mass or organomegaly  Skin: L flank wounds dressed; R hip VAC, R thigh and knee/leg wounds healed  Extremities: mild edema  Neurologic: alert, moves all extremities    LAB RESULTS:                        7.5    13.52 )-----------( 330      ( 22 Feb 2019 10:00 )             26.0   02-22    138  |  108  |  36<H>  ----------------------------<  94  5.1   |  17<L>  |  1.43<H>    Ca    9.2      22 Feb 2019 07:30    Vancomycin Level, Random (02.22.19 @ 07:30)    Vancomycin Level, Random: 18.8    MICROBIOLOGY:  RECENT CULTURES:  Culture - Blood in AM (02.14.19 @ 12:00)    Specimen Source: .Blood Blood    Culture Results:   No growth at 5 days.    RADIOLOGY REVIEWED:  VA Duplex Lower Ext Vein Scan, Bilat (02.20.19 @ 12:32) >  Present on this examination is partially occlusive DVT   affecting the left common femoral vein.    There has been interval resolution of the previously seen right lower   extremity DVT.

## 2019-02-22 NOTE — PROGRESS NOTE ADULT - ASSESSMENT
79 yo female with history of PMR, Pyoderma Gangrenosum (steroid taper and cyclosporine), s/p TAVR and explant of infected R Knee for MRSA (doxy suppression, spacer) admitted with fever to 102 and leukocytosis- S epi in 3 of 4 adm Bld Cxs  She also has been treated for PICC related S epi bacteremia (Sept '18)  Wounds all improving- wound care f/u appreciated- no exposed bone in any wounds  No obvious primary source for S epi at present- raises concern of either heart valve infection or PPM infection- ?seeding from prior episode  Her latest isolate is resistant to beta lactams and most second line agents, Vanco EDWARD 4.  F/u blood cultures 2/14 are negative; afebrile and decreased wbc count  New L LE DVT noted (IVC filter already in place)  Still awaiting ECHO    Suggest:  Will restart Vanco 1 g q 48  With prospect of endovascular source, will need 4-6 wk course- hope this is adequate, recognizing high risk for recurrence- will need PICC  Will consider addition of Rifampin, but I am not sure pt will tolerate it, drug-drug interactions; await ECHO  ERIKA would be ideal, but probably would not  as likely not a candidate for valve or device intervention  Follow temps and CBC/diff   Local wound care

## 2019-02-22 NOTE — PROGRESS NOTE ADULT - SUBJECTIVE AND OBJECTIVE BOX
Pain in her chronic wounds ramps during wound vac changes    Vital Signs Last 24 Hrs  T(C): 36.8 (22 Feb 2019 08:03), Max: 37.6 (22 Feb 2019 05:56)  T(F): 98.2 (22 Feb 2019 08:03), Max: 99.6 (22 Feb 2019 05:56)  HR: 84 (22 Feb 2019 08:03) (75 - 84)  BP: 112/58 (22 Feb 2019 08:03) (112/58 - 129/66)  BP(mean): --  RR: 17 (22 Feb 2019 08:03) (16 - 18)  SpO2: 92% (22 Feb 2019 08:03) (92% - 96%)    GENERAL: NAD, lying in bed  HEAD:  Atraumatic, Normocephalic  EYES: EOMI, PERRLA, conjunctiva and sclera clear  NECK: Supple, No JVD  CHEST/LUNG: mild decrease breath sounds bilaterally; No wheeze   HEART: Regular rate and rhythm; No murmurs, rubs, or gallops  ABDOMEN: Soft, Nontender, Nondistended; Bowel sounds present  Neuro: AAO x 2, no focal deficit  EXTREMITIES:  2+ Peripheral Pulses, No clubbing, cyanosis.  1+ nonpitting edema LE  Back: sacral and thigh wound. +wound vac in place  SKIN: No rashes or lesions    LABS:                        7.5    13.52 )-----------( 330      ( 22 Feb 2019 10:00 )             26.0     02-22    138  |  108  |  36<H>  ----------------------------<  94  5.1   |  17<L>  |  1.43<H>    Ca    9.2      22 Feb 2019 07:30        CAPILLARY BLOOD GLUCOSE

## 2019-02-23 LAB
ANION GAP SERPL CALC-SCNC: 13 MMOL/L — SIGNIFICANT CHANGE UP (ref 5–17)
BUN SERPL-MCNC: 41 MG/DL — HIGH (ref 7–23)
CALCIUM SERPL-MCNC: 9.3 MG/DL — SIGNIFICANT CHANGE UP (ref 8.4–10.5)
CHLORIDE SERPL-SCNC: 108 MMOL/L — SIGNIFICANT CHANGE UP (ref 96–108)
CO2 SERPL-SCNC: 18 MMOL/L — LOW (ref 22–31)
CREAT SERPL-MCNC: 1.65 MG/DL — HIGH (ref 0.5–1.3)
GLUCOSE SERPL-MCNC: 96 MG/DL — SIGNIFICANT CHANGE UP (ref 70–99)
POTASSIUM SERPL-MCNC: 5.2 MMOL/L — SIGNIFICANT CHANGE UP (ref 3.5–5.3)
POTASSIUM SERPL-SCNC: 5.2 MMOL/L — SIGNIFICANT CHANGE UP (ref 3.5–5.3)
SODIUM SERPL-SCNC: 139 MMOL/L — SIGNIFICANT CHANGE UP (ref 135–145)
VANCOMYCIN TROUGH SERPL-MCNC: 21.5 UG/ML — HIGH (ref 10–20)

## 2019-02-23 RX ORDER — SODIUM CHLORIDE 9 MG/ML
1000 INJECTION INTRAMUSCULAR; INTRAVENOUS; SUBCUTANEOUS
Qty: 0 | Refills: 0 | Status: DISCONTINUED | OUTPATIENT
Start: 2019-02-23 | End: 2019-02-27

## 2019-02-23 RX ADMIN — ATORVASTATIN CALCIUM 10 MILLIGRAM(S): 80 TABLET, FILM COATED ORAL at 21:36

## 2019-02-23 RX ADMIN — Medication 1 TABLET(S): at 14:34

## 2019-02-23 RX ADMIN — Medication 1 MILLIGRAM(S): at 14:38

## 2019-02-23 RX ADMIN — Medication 81 MILLIGRAM(S): at 14:33

## 2019-02-23 RX ADMIN — ESCITALOPRAM OXALATE 5 MILLIGRAM(S): 10 TABLET, FILM COATED ORAL at 14:32

## 2019-02-23 RX ADMIN — Medication 650 MILLIGRAM(S): at 23:36

## 2019-02-23 RX ADMIN — HEPARIN SODIUM 5000 UNIT(S): 5000 INJECTION INTRAVENOUS; SUBCUTANEOUS at 21:51

## 2019-02-23 RX ADMIN — Medication 5 MILLIGRAM(S): at 21:35

## 2019-02-23 RX ADMIN — POLYETHYLENE GLYCOL 3350 17 GRAM(S): 17 POWDER, FOR SOLUTION ORAL at 14:34

## 2019-02-23 RX ADMIN — GABAPENTIN 300 MILLIGRAM(S): 400 CAPSULE ORAL at 21:50

## 2019-02-23 RX ADMIN — GABAPENTIN 300 MILLIGRAM(S): 400 CAPSULE ORAL at 14:36

## 2019-02-23 RX ADMIN — PANTOPRAZOLE SODIUM 40 MILLIGRAM(S): 20 TABLET, DELAYED RELEASE ORAL at 14:34

## 2019-02-23 RX ADMIN — HEPARIN SODIUM 5000 UNIT(S): 5000 INJECTION INTRAVENOUS; SUBCUTANEOUS at 14:35

## 2019-02-23 RX ADMIN — Medication 1000 UNIT(S): at 14:35

## 2019-02-23 RX ADMIN — Medication 1 TABLET(S): at 14:37

## 2019-02-23 RX ADMIN — Medication 1 TABLET(S): at 14:39

## 2019-02-23 RX ADMIN — CYCLOSPORINE 200 MILLIGRAM(S): 100 CAPSULE ORAL at 12:24

## 2019-02-23 RX ADMIN — Medication 650 MILLIGRAM(S): at 21:36

## 2019-02-23 RX ADMIN — Medication 500 MILLIGRAM(S): at 14:33

## 2019-02-23 RX ADMIN — Medication 1 TABLET(S): at 21:36

## 2019-02-23 RX ADMIN — CYCLOSPORINE 200 MILLIGRAM(S): 100 CAPSULE ORAL at 21:37

## 2019-02-23 NOTE — PROGRESS NOTE ADULT - SUBJECTIVE AND OBJECTIVE BOX
As per aide, small amount of blood seen on her sheets last night, but source undiscernible    Vital Signs Last 24 Hrs  T(C): 37.2 (23 Feb 2019 07:45), Max: 37.9 (23 Feb 2019 04:27)  T(F): 99 (23 Feb 2019 07:45), Max: 100.3 (23 Feb 2019 04:27)  HR: 86 (23 Feb 2019 07:45) (82 - 86)  BP: 116/73 (23 Feb 2019 07:45) (108/69 - 118/64)  BP(mean): --  RR: 18 (23 Feb 2019 07:45) (18 - 18)  SpO2: 96% (23 Feb 2019 07:45) (93% - 100%)    GENERAL: NAD, lying in bed  HEAD:  Atraumatic, Normocephalic  EYES: EOMI, PERRLA, conjunctiva and sclera clear  NECK: Supple, No JVD  CHEST/LUNG: mild decrease breath sounds bilaterally; No wheeze   HEART: Regular rate and rhythm; No murmurs, rubs, or gallops  ABDOMEN: Soft, Nontender, Nondistended; Bowel sounds present  Neuro: AAO x 2, no focal deficit  EXTREMITIES:  2+ Peripheral Pulses, No clubbing, cyanosis.  1+ nonpitting edema LE  Back: sacral and thigh wound. +wound vac in place  SKIN: No rashes or lesions    LABS:                        7.5    13.52 )-----------( 330      ( 22 Feb 2019 10:00 )             26.0     02-23    139  |  108  |  41<H>  ----------------------------<  96  5.2   |  18<L>  |  1.65<H>    Ca    9.3      23 Feb 2019 06:58        CAPILLARY BLOOD GLUCOSE

## 2019-02-23 NOTE — PROGRESS NOTE ADULT - ASSESSMENT
79 yo female with history of PMR, Pyoderma Gangrenosum (steroid taper and cyclosporine), s/p TAVR and explant of infected R Knee for MRSA (doxy suppression, spacer) admitted with fever to 102 and leukocytosis- S epi in 3 of 4 adm Bld Cxs  She also was treated for PICC related S epi bacteremia Sept '18  Wounds all improving- wound care f/u appreciated- no exposed bone in any wounds  No obvious primary source for S epi at present- raises concern of either heart valve infection or PPM infection- ?seeding from prior episode  Her latest isolate is resistant to beta lactams and most second line agents, Vanco EDWARD 4.  F/u blood cultures 2/14 are negative; afebrile and decreased wbc count  New L LE DVT noted (IVC filter already in place)- could account for Tmx 100.3  Still awaiting ECHO  Vanco level this AM is random; not due for next dose until tomorrow    Suggest:  Continue Vanco 1 g q 48, level will be < 20 tomorrow   With prospect of endovascular source, will need 4-6 wk course- hope this is adequate, recognizing high risk for recurrence- will need PICC  Await ECHO; possibly addition of Rif  ERIKA would be ideal, but probably would not  as likely not a candidate for valve or device intervention  Follow temps and CBC/diff   Local wound care  D/w NP

## 2019-02-23 NOTE — PROGRESS NOTE ADULT - SUBJECTIVE AND OBJECTIVE BOX
CC: f/u for coag negative staph bacteremia    Patient somnolent, no distress    REVIEW OF SYSTEMS:  All other review of systems negative (Comprehensive ROS)    Antimicrobials Day # 11 vancomycin   Medications Reviewed    Vital Signs Last 24 Hrs  T(F): 99 (23 Feb 2019 07:45), Max: 100.3 (23 Feb 2019 04:27)  HR: 86 (23 Feb 2019 07:45) (82 - 86)  BP: 116/73 (23 Feb 2019 07:45) (108/69 - 118/64)  BP(mean): --  RR: 18 (23 Feb 2019 07:45) (18 - 18)  SpO2: 96% (23 Feb 2019 07:45) (93% - 100%)    PHYSICAL EXAM:  General: no acute distress  Eyes:  anicteric, no conjunctival injection, no discharge  Oropharynx: no lesions or injection 	  Neck: supple, without adenopathy  Lungs: clear to auscultation  Heart: regular rate and rhythm; no murmur, rubs or gallops  Abdomen: soft, nondistended, nontender, without mass or organomegaly  Skin: L flank wounds dressed; R hip VAC, R thigh and knee/leg wounds healed  Extremities: mild edema  Neurologic: alert, moves all extremities    LAB RESULTS:                        7.5    13.52 )-----------( 330      ( 22 Feb 2019 10:00 )             26.0   02-23    139  |  108  |  41<H>  ----------------------------<  96  5.2   |  18<L>  |  1.65<H>    Ca    9.3      23 Feb 2019 06:58    Vancomycin Level, Trough -Pre 3rd Dose, order if dosed q24/36/48h (02.23.19 @ 06:58)    Vancomycin Level, Trough: 21      MICROBIOLOGY:  RECENT CULTURES:  Culture - Blood in AM (02.14.19 @ 12:00)    Specimen Source: .Blood Blood    Culture Results:   No growth at 5 days.    RADIOLOGY REVIEWED:  VA Duplex Lower Ext Vein Scan, Bilat (02.20.19 @ 12:32) >  Present on this examination is partially occlusive DVT   affecting the left common femoral vein.    There has been interval resolution of the previously seen right lower   extremity DVT.

## 2019-02-24 LAB
ANION GAP SERPL CALC-SCNC: 12 MMOL/L — SIGNIFICANT CHANGE UP (ref 5–17)
BASOPHILS # BLD AUTO: 0.06 K/UL — SIGNIFICANT CHANGE UP (ref 0–0.2)
BASOPHILS NFR BLD AUTO: 0.4 % — SIGNIFICANT CHANGE UP (ref 0–2)
BUN SERPL-MCNC: 43 MG/DL — HIGH (ref 7–23)
CALCIUM SERPL-MCNC: 8.9 MG/DL — SIGNIFICANT CHANGE UP (ref 8.4–10.5)
CHLORIDE SERPL-SCNC: 107 MMOL/L — SIGNIFICANT CHANGE UP (ref 96–108)
CO2 SERPL-SCNC: 18 MMOL/L — LOW (ref 22–31)
CREAT SERPL-MCNC: 1.61 MG/DL — HIGH (ref 0.5–1.3)
EOSINOPHIL # BLD AUTO: 0.31 K/UL — SIGNIFICANT CHANGE UP (ref 0–0.5)
EOSINOPHIL NFR BLD AUTO: 2 % — SIGNIFICANT CHANGE UP (ref 0–6)
GLUCOSE SERPL-MCNC: 94 MG/DL — SIGNIFICANT CHANGE UP (ref 70–99)
HCT VFR BLD CALC: 26.9 % — LOW (ref 34.5–45)
HGB BLD-MCNC: 8 G/DL — LOW (ref 11.5–15.5)
IMM GRANULOCYTES NFR BLD AUTO: 1.4 % — SIGNIFICANT CHANGE UP (ref 0–1.5)
LYMPHOCYTES # BLD AUTO: 19.2 % — SIGNIFICANT CHANGE UP (ref 13–44)
LYMPHOCYTES # BLD AUTO: 2.96 K/UL — SIGNIFICANT CHANGE UP (ref 1–3.3)
MCHC RBC-ENTMCNC: 26.8 PG — LOW (ref 27–34)
MCHC RBC-ENTMCNC: 29.7 GM/DL — LOW (ref 32–36)
MCV RBC AUTO: 90 FL — SIGNIFICANT CHANGE UP (ref 80–100)
MONOCYTES # BLD AUTO: 0.83 K/UL — SIGNIFICANT CHANGE UP (ref 0–0.9)
MONOCYTES NFR BLD AUTO: 5.4 % — SIGNIFICANT CHANGE UP (ref 2–14)
NEUTROPHILS # BLD AUTO: 11.06 K/UL — HIGH (ref 1.8–7.4)
NEUTROPHILS NFR BLD AUTO: 71.6 % — SIGNIFICANT CHANGE UP (ref 43–77)
PLATELET # BLD AUTO: 394 K/UL — SIGNIFICANT CHANGE UP (ref 150–400)
POTASSIUM SERPL-MCNC: 5.4 MMOL/L — HIGH (ref 3.5–5.3)
POTASSIUM SERPL-SCNC: 5.4 MMOL/L — HIGH (ref 3.5–5.3)
RBC # BLD: 2.99 M/UL — LOW (ref 3.8–5.2)
RBC # FLD: 17.1 % — HIGH (ref 10.3–14.5)
SODIUM SERPL-SCNC: 137 MMOL/L — SIGNIFICANT CHANGE UP (ref 135–145)
WBC # BLD: 15.44 K/UL — HIGH (ref 3.8–10.5)
WBC # FLD AUTO: 15.44 K/UL — HIGH (ref 3.8–10.5)

## 2019-02-24 RX ADMIN — Medication 1 TABLET(S): at 13:05

## 2019-02-24 RX ADMIN — GABAPENTIN 300 MILLIGRAM(S): 400 CAPSULE ORAL at 18:11

## 2019-02-24 RX ADMIN — CYCLOSPORINE 200 MILLIGRAM(S): 100 CAPSULE ORAL at 22:44

## 2019-02-24 RX ADMIN — ESCITALOPRAM OXALATE 5 MILLIGRAM(S): 10 TABLET, FILM COATED ORAL at 13:04

## 2019-02-24 RX ADMIN — Medication 250 MILLIGRAM(S): at 13:22

## 2019-02-24 RX ADMIN — HEPARIN SODIUM 5000 UNIT(S): 5000 INJECTION INTRAVENOUS; SUBCUTANEOUS at 22:43

## 2019-02-24 RX ADMIN — GABAPENTIN 300 MILLIGRAM(S): 400 CAPSULE ORAL at 06:32

## 2019-02-24 RX ADMIN — ATORVASTATIN CALCIUM 10 MILLIGRAM(S): 80 TABLET, FILM COATED ORAL at 22:41

## 2019-02-24 RX ADMIN — Medication 1000 UNIT(S): at 13:05

## 2019-02-24 RX ADMIN — OXYCODONE AND ACETAMINOPHEN 1 TABLET(S): 5; 325 TABLET ORAL at 07:15

## 2019-02-24 RX ADMIN — Medication 5 MILLIGRAM(S): at 22:43

## 2019-02-24 RX ADMIN — HEPARIN SODIUM 5000 UNIT(S): 5000 INJECTION INTRAVENOUS; SUBCUTANEOUS at 06:32

## 2019-02-24 RX ADMIN — Medication 81 MILLIGRAM(S): at 13:04

## 2019-02-24 RX ADMIN — Medication 1 TABLET(S): at 06:32

## 2019-02-24 RX ADMIN — HEPARIN SODIUM 5000 UNIT(S): 5000 INJECTION INTRAVENOUS; SUBCUTANEOUS at 13:05

## 2019-02-24 RX ADMIN — POLYETHYLENE GLYCOL 3350 17 GRAM(S): 17 POWDER, FOR SOLUTION ORAL at 13:05

## 2019-02-24 RX ADMIN — PANTOPRAZOLE SODIUM 40 MILLIGRAM(S): 20 TABLET, DELAYED RELEASE ORAL at 06:32

## 2019-02-24 RX ADMIN — OXYCODONE AND ACETAMINOPHEN 1 TABLET(S): 5; 325 TABLET ORAL at 23:11

## 2019-02-24 RX ADMIN — CYCLOSPORINE 200 MILLIGRAM(S): 100 CAPSULE ORAL at 13:06

## 2019-02-24 RX ADMIN — Medication 500 MILLIGRAM(S): at 13:04

## 2019-02-24 RX ADMIN — Medication 1 TABLET(S): at 22:43

## 2019-02-24 RX ADMIN — Medication 1 MILLIGRAM(S): at 13:04

## 2019-02-24 RX ADMIN — OXYCODONE AND ACETAMINOPHEN 1 TABLET(S): 5; 325 TABLET ORAL at 07:21

## 2019-02-24 RX ADMIN — OXYCODONE AND ACETAMINOPHEN 1 TABLET(S): 5; 325 TABLET ORAL at 22:41

## 2019-02-24 NOTE — PROGRESS NOTE ADULT - SUBJECTIVE AND OBJECTIVE BOX
chronic persistent pain at her wound sites    Vital Signs Last 24 Hrs  T(C): 36.7 (24 Feb 2019 07:48), Max: 38.1 (23 Feb 2019 21:16)  T(F): 98.1 (24 Feb 2019 07:48), Max: 100.5 (23 Feb 2019 21:16)  HR: 92 (24 Feb 2019 07:48) (92 - 96)  BP: 106/70 (24 Feb 2019 07:48) (106/70 - 110/70)  BP(mean): --  RR: 18 (24 Feb 2019 07:48) (18 - 18)  SpO2: 99% (24 Feb 2019 07:48) (95% - 99%)    GENERAL: NAD, lying in bed  HEAD:  Atraumatic, Normocephalic  EYES: EOMI, PERRLA, conjunctiva and sclera clear  NECK: Supple, No JVD  CHEST/LUNG: mild decrease breath sounds bilaterally; No wheeze   HEART: Regular rate and rhythm; No murmurs, rubs, or gallops  ABDOMEN: Soft, Nontender, Nondistended; Bowel sounds present  Neuro: AAO x 2, no focal deficit  EXTREMITIES:  2+ Peripheral Pulses, No clubbing, cyanosis.  1+ nonpitting edema LE  Back: sacral and thigh wound. +wound vac in place  SKIN: No rashes or lesions    LABS:                        8.0    15.44 )-----------( 394      ( 24 Feb 2019 09:05 )             26.9     02-24    137  |  107  |  43<H>  ----------------------------<  94  5.4<H>   |  18<L>  |  1.61<H>    Ca    8.9      24 Feb 2019 07:03        CAPILLARY BLOOD GLUCOSE

## 2019-02-24 NOTE — PROGRESS NOTE ADULT - PROBLEM SELECTOR PLAN 1
c/w vancomycin, monitor vanc trough.  initially on Meropenem but later discontinued after culture data.  blood cultures from 2/14 are negative.  ID f/u appreciated  cyclosporine temporarily held on admission, but now restarted. d/w ID, okay to restart. Previously seen by Dermatology. reconsulted but states no urgent issues. To c/w steroid sparing agent cyclosporine.   completed chronic steroids taper for pyoderma.   (she was on Prednisone 5 mg qdaily. doing well off steroids)  Per orthopedic surgery can be OOB primarily to wheelchair and can 50% weight bear on R leg if straight in immobilizer.  wound care consult and wound vac management appreciated.  LE edema likely due to IVF. will d/c IVF. Cr stable. PRN IVF if needed  LE dopplers to r/o DVT  *** TTE pending but most likely will not change fact that she will need PICC for 4-6 week of antibx

## 2019-02-25 LAB
ANION GAP SERPL CALC-SCNC: 12 MMOL/L — SIGNIFICANT CHANGE UP (ref 5–17)
BUN SERPL-MCNC: 46 MG/DL — HIGH (ref 7–23)
CALCIUM SERPL-MCNC: 8.9 MG/DL — SIGNIFICANT CHANGE UP (ref 8.4–10.5)
CHLORIDE SERPL-SCNC: 109 MMOL/L — HIGH (ref 96–108)
CO2 SERPL-SCNC: 17 MMOL/L — LOW (ref 22–31)
CREAT SERPL-MCNC: 1.7 MG/DL — HIGH (ref 0.5–1.3)
GLUCOSE SERPL-MCNC: 120 MG/DL — HIGH (ref 70–99)
POTASSIUM SERPL-MCNC: 6 MMOL/L — HIGH (ref 3.5–5.3)
POTASSIUM SERPL-SCNC: 6 MMOL/L — HIGH (ref 3.5–5.3)
SODIUM SERPL-SCNC: 138 MMOL/L — SIGNIFICANT CHANGE UP (ref 135–145)

## 2019-02-25 PROCEDURE — 93010 ELECTROCARDIOGRAM REPORT: CPT

## 2019-02-25 PROCEDURE — 93306 TTE W/DOPPLER COMPLETE: CPT | Mod: 26

## 2019-02-25 RX ORDER — ACETAMINOPHEN 500 MG
650 TABLET ORAL ONCE
Qty: 0 | Refills: 0 | Status: COMPLETED | OUTPATIENT
Start: 2019-02-25 | End: 2019-02-25

## 2019-02-25 RX ORDER — SODIUM POLYSTYRENE SULFONATE 4.1 MEQ/G
30 POWDER, FOR SUSPENSION ORAL ONCE
Qty: 0 | Refills: 0 | Status: COMPLETED | OUTPATIENT
Start: 2019-02-25 | End: 2019-02-25

## 2019-02-25 RX ADMIN — Medication 650 MILLIGRAM(S): at 22:35

## 2019-02-25 RX ADMIN — GABAPENTIN 300 MILLIGRAM(S): 400 CAPSULE ORAL at 17:21

## 2019-02-25 RX ADMIN — Medication 1 TABLET(S): at 12:30

## 2019-02-25 RX ADMIN — PANTOPRAZOLE SODIUM 40 MILLIGRAM(S): 20 TABLET, DELAYED RELEASE ORAL at 06:37

## 2019-02-25 RX ADMIN — OXYCODONE AND ACETAMINOPHEN 1 TABLET(S): 5; 325 TABLET ORAL at 11:00

## 2019-02-25 RX ADMIN — Medication 500 MILLIGRAM(S): at 12:30

## 2019-02-25 RX ADMIN — ATORVASTATIN CALCIUM 10 MILLIGRAM(S): 80 TABLET, FILM COATED ORAL at 22:23

## 2019-02-25 RX ADMIN — Medication 5 MILLIGRAM(S): at 22:23

## 2019-02-25 RX ADMIN — AMLODIPINE BESYLATE 10 MILLIGRAM(S): 2.5 TABLET ORAL at 06:37

## 2019-02-25 RX ADMIN — Medication 25 MILLIGRAM(S): at 06:37

## 2019-02-25 RX ADMIN — Medication 1000 UNIT(S): at 12:30

## 2019-02-25 RX ADMIN — HEPARIN SODIUM 5000 UNIT(S): 5000 INJECTION INTRAVENOUS; SUBCUTANEOUS at 12:30

## 2019-02-25 RX ADMIN — Medication 81 MILLIGRAM(S): at 12:30

## 2019-02-25 RX ADMIN — CYCLOSPORINE 200 MILLIGRAM(S): 100 CAPSULE ORAL at 08:48

## 2019-02-25 RX ADMIN — Medication 1 MILLIGRAM(S): at 12:30

## 2019-02-25 RX ADMIN — GABAPENTIN 300 MILLIGRAM(S): 400 CAPSULE ORAL at 06:37

## 2019-02-25 RX ADMIN — Medication 1 TABLET(S): at 22:22

## 2019-02-25 RX ADMIN — POLYETHYLENE GLYCOL 3350 17 GRAM(S): 17 POWDER, FOR SOLUTION ORAL at 12:31

## 2019-02-25 RX ADMIN — HEPARIN SODIUM 5000 UNIT(S): 5000 INJECTION INTRAVENOUS; SUBCUTANEOUS at 22:22

## 2019-02-25 RX ADMIN — CYCLOSPORINE 200 MILLIGRAM(S): 100 CAPSULE ORAL at 22:09

## 2019-02-25 RX ADMIN — ESCITALOPRAM OXALATE 5 MILLIGRAM(S): 10 TABLET, FILM COATED ORAL at 12:31

## 2019-02-25 RX ADMIN — Medication 1 TABLET(S): at 06:37

## 2019-02-25 NOTE — PROGRESS NOTE ADULT - ASSESSMENT
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  F/u blood cultures 2/14 are negative; afebrile  New L LE DVT noted (IVC filter already in place)  leukocytosis, no fevers  Suggest:  Continue Vanco 1 g q 48, repeat level prior to next dose tomorrow  anticipate 4-6 wk course via PICC  echo, ERIKA would be ideal, but will not , since she is a poor candidate for valve intervention or device explant   Follow temps and CBC/diff   Local wound care  D/w RN

## 2019-02-25 NOTE — PROGRESS NOTE ADULT - SUBJECTIVE AND OBJECTIVE BOX
CC: f/u for coag negative staph bacteremia    Pt is resting in bed, mostly bedbound, poor PO intake as per aide    REVIEW OF SYSTEMS: as above  All other review of systems negative (Comprehensive ROS)    Antimicrobials Day # 13  vancomycin  IVPB 1000 milliGRAM(s) IV Intermittent every 48 hours  Vital Signs Last 24 Hrs  T(C): 37.1 (25 Feb 2019 06:46), Max: 37.1 (24 Feb 2019 21:25)  T(F): 98.7 (25 Feb 2019 06:46), Max: 98.8 (24 Feb 2019 21:25)  HR: 102 (25 Feb 2019 06:46) (85 - 102)  BP: 123/75 (25 Feb 2019 06:46) (113/75 - 124/71)  BP(mean): --  RR: 18 (25 Feb 2019 06:46) (16 - 18)  SpO2: 93% (25 Feb 2019 06:46) (93% - 96%)  Medications Reviewed        PHYSICAL EXAM:  General: no acute distress  Eyes:  anicteric, no conjunctival injection, no discharge  Oropharynx: no lesions or injection 	  Neck: supple, without adenopathy  Lungs: clear to auscultation  Heart: regular rate and rhythm; no murmur, rubs or gallops  Abdomen: soft, nondistended, nontender, without mass or organomegaly  Skin: L flank wounds dressed; R hip VAC, R thigh and knee/leg wounds healed  Extremities: mild edema  Neurologic: alert, generalized weakness    LAB RESULTS:                                   8.0    15.44 )-----------( 394      ( 24 Feb 2019 09:05 )             26.9   02-24    137  |  107  |  43<H>  ----------------------------<  94  5.4<H>   |  18<L>  |  1.61<H>    Ca    8.9      24 Feb 2019 07:03        MICROBIOLOGY:  RECENT CULTURES reviewed    RADIOLOGY REVIEWED

## 2019-02-25 NOTE — PROGRESS NOTE ADULT - SUBJECTIVE AND OBJECTIVE BOX
no fevers last night  pain all over her body    Vital Signs Last 24 Hrs  T(C): 36.7 (25 Feb 2019 16:00), Max: 37.1 (24 Feb 2019 21:25)  T(F): 98 (25 Feb 2019 16:00), Max: 98.8 (24 Feb 2019 21:25)  HR: 86 (25 Feb 2019 16:00) (86 - 102)  BP: 105/64 (25 Feb 2019 16:00) (105/64 - 123/75)  BP(mean): --  RR: 18 (25 Feb 2019 16:00) (16 - 18)  SpO2: 95% (25 Feb 2019 16:00) (93% - 96%)    GENERAL: NAD, lying in bed  HEAD:  Atraumatic, Normocephalic  EYES: EOMI, PERRLA, conjunctiva and sclera clear  NECK: Supple, No JVD  CHEST/LUNG: mild decrease breath sounds bilaterally; No wheeze   HEART: Regular rate and rhythm; No murmurs, rubs, or gallops  ABDOMEN: Soft, Nontender, Nondistended; Bowel sounds present  Neuro: AAO x 2, no focal deficit  EXTREMITIES:  2+ Peripheral Pulses, No clubbing, cyanosis.  1+ nonpitting edema LE  Back: sacral and thigh wound. +wound vac in place  SKIN: No rashes or lesions    LABS:                        8.0    15.44 )-----------( 394      ( 24 Feb 2019 09:05 )             26.9     02-24    137  |  107  |  43<H>  ----------------------------<  94  5.4<H>   |  18<L>  |  1.61<H>    Ca    8.9      24 Feb 2019 07:03        CAPILLARY BLOOD GLUCOSE

## 2019-02-25 NOTE — PROGRESS NOTE ADULT - PROBLEM SELECTOR PLAN 1
c/w vancomycin, monitor vanc trough.  initially on Meropenem but later discontinued after culture data.  blood cultures from 2/14 are negative.  ID f/u appreciated  cyclosporine temporarily held on admission, but now restarted. d/w ID, okay to restart. Previously seen by Dermatology. reconsulted but states no urgent issues. To c/w steroid sparing agent cyclosporine.   completed chronic steroids taper for pyoderma.   (she was on Prednisone 5 mg qdaily. doing well off steroids)  Per orthopedic surgery can be OOB primarily to wheelchair and can 50% weight bear on R leg if straight in immobilizer.  wound care consult and wound vac management appreciated.  LE edema likely due to IVF. will d/c IVF. Cr stable. PRN IVF if needed  LE dopplers to r/o DVT  *** TTE pending but most likely will not change fact that she will need PICC for 4-6 week of antibx  TTE results noted, can proceed with PICC

## 2019-02-26 DIAGNOSIS — N17.9 ACUTE KIDNEY FAILURE, UNSPECIFIED: ICD-10-CM

## 2019-02-26 DIAGNOSIS — E87.5 HYPERKALEMIA: ICD-10-CM

## 2019-02-26 LAB
ANION GAP SERPL CALC-SCNC: 13 MMOL/L — SIGNIFICANT CHANGE UP (ref 5–17)
APPEARANCE UR: ABNORMAL
BILIRUB UR-MCNC: NEGATIVE — SIGNIFICANT CHANGE UP
BUN SERPL-MCNC: 46 MG/DL — HIGH (ref 7–23)
CALCIUM SERPL-MCNC: 9 MG/DL — SIGNIFICANT CHANGE UP (ref 8.4–10.5)
CHLORIDE SERPL-SCNC: 105 MMOL/L — SIGNIFICANT CHANGE UP (ref 96–108)
CHLORIDE UR-SCNC: <35 MMOL/L — SIGNIFICANT CHANGE UP
CO2 SERPL-SCNC: 20 MMOL/L — LOW (ref 22–31)
COLOR SPEC: ABNORMAL
CREAT SERPL-MCNC: 1.81 MG/DL — HIGH (ref 0.5–1.3)
DIFF PNL FLD: NEGATIVE — SIGNIFICANT CHANGE UP
GLUCOSE SERPL-MCNC: 135 MG/DL — HIGH (ref 70–99)
GLUCOSE UR QL: NEGATIVE — SIGNIFICANT CHANGE UP
KETONES UR-MCNC: NEGATIVE — SIGNIFICANT CHANGE UP
LEUKOCYTE ESTERASE UR-ACNC: ABNORMAL
NITRITE UR-MCNC: NEGATIVE — SIGNIFICANT CHANGE UP
OSMOLALITY UR: 509 MOS/KG — SIGNIFICANT CHANGE UP (ref 50–1200)
PH UR: 5 — SIGNIFICANT CHANGE UP (ref 5–8)
POTASSIUM SERPL-MCNC: 5.3 MMOL/L — SIGNIFICANT CHANGE UP (ref 3.5–5.3)
POTASSIUM SERPL-SCNC: 5.3 MMOL/L — SIGNIFICANT CHANGE UP (ref 3.5–5.3)
POTASSIUM UR-SCNC: 70 MMOL/L — SIGNIFICANT CHANGE UP
PROT UR-MCNC: ABNORMAL
SODIUM SERPL-SCNC: 138 MMOL/L — SIGNIFICANT CHANGE UP (ref 135–145)
SODIUM UR-SCNC: <20 MMOL/L — SIGNIFICANT CHANGE UP
SP GR SPEC: 1.03 — HIGH (ref 1.01–1.02)
UROBILINOGEN FLD QL: ABNORMAL
VANCOMYCIN TROUGH SERPL-MCNC: 16.6 UG/ML — SIGNIFICANT CHANGE UP (ref 10–20)

## 2019-02-26 PROCEDURE — 93010 ELECTROCARDIOGRAM REPORT: CPT

## 2019-02-26 PROCEDURE — 71045 X-RAY EXAM CHEST 1 VIEW: CPT | Mod: 26

## 2019-02-26 RX ORDER — METOPROLOL TARTRATE 50 MG
12.5 TABLET ORAL
Qty: 0 | Refills: 0 | Status: DISCONTINUED | OUTPATIENT
Start: 2019-02-26 | End: 2019-03-25

## 2019-02-26 RX ORDER — PANTOPRAZOLE SODIUM 20 MG/1
40 TABLET, DELAYED RELEASE ORAL ONCE
Qty: 0 | Refills: 0 | Status: COMPLETED | OUTPATIENT
Start: 2019-02-26 | End: 2019-02-26

## 2019-02-26 RX ADMIN — Medication 1 TABLET(S): at 22:11

## 2019-02-26 RX ADMIN — AMLODIPINE BESYLATE 10 MILLIGRAM(S): 2.5 TABLET ORAL at 06:19

## 2019-02-26 RX ADMIN — SODIUM POLYSTYRENE SULFONATE 30 GRAM(S): 4.1 POWDER, FOR SUSPENSION ORAL at 00:34

## 2019-02-26 RX ADMIN — Medication 500 MILLIGRAM(S): at 11:23

## 2019-02-26 RX ADMIN — CYCLOSPORINE 200 MILLIGRAM(S): 100 CAPSULE ORAL at 11:27

## 2019-02-26 RX ADMIN — Medication 81 MILLIGRAM(S): at 11:23

## 2019-02-26 RX ADMIN — ATORVASTATIN CALCIUM 10 MILLIGRAM(S): 80 TABLET, FILM COATED ORAL at 22:11

## 2019-02-26 RX ADMIN — GABAPENTIN 300 MILLIGRAM(S): 400 CAPSULE ORAL at 06:19

## 2019-02-26 RX ADMIN — CYCLOSPORINE 200 MILLIGRAM(S): 100 CAPSULE ORAL at 22:17

## 2019-02-26 RX ADMIN — Medication 250 MILLIGRAM(S): at 15:50

## 2019-02-26 RX ADMIN — ESCITALOPRAM OXALATE 5 MILLIGRAM(S): 10 TABLET, FILM COATED ORAL at 11:24

## 2019-02-26 RX ADMIN — HEPARIN SODIUM 5000 UNIT(S): 5000 INJECTION INTRAVENOUS; SUBCUTANEOUS at 15:51

## 2019-02-26 RX ADMIN — Medication 1 MILLIGRAM(S): at 11:23

## 2019-02-26 RX ADMIN — POLYETHYLENE GLYCOL 3350 17 GRAM(S): 17 POWDER, FOR SOLUTION ORAL at 11:23

## 2019-02-26 RX ADMIN — Medication 1 TABLET(S): at 06:19

## 2019-02-26 RX ADMIN — Medication 1000 UNIT(S): at 11:23

## 2019-02-26 RX ADMIN — PANTOPRAZOLE SODIUM 40 MILLIGRAM(S): 20 TABLET, DELAYED RELEASE ORAL at 06:49

## 2019-02-26 RX ADMIN — HEPARIN SODIUM 5000 UNIT(S): 5000 INJECTION INTRAVENOUS; SUBCUTANEOUS at 06:19

## 2019-02-26 RX ADMIN — Medication 5 MILLIGRAM(S): at 22:11

## 2019-02-26 RX ADMIN — Medication 1 TABLET(S): at 14:21

## 2019-02-26 RX ADMIN — GABAPENTIN 300 MILLIGRAM(S): 400 CAPSULE ORAL at 17:38

## 2019-02-26 RX ADMIN — Medication 1 TABLET(S): at 11:26

## 2019-02-26 RX ADMIN — HEPARIN SODIUM 5000 UNIT(S): 5000 INJECTION INTRAVENOUS; SUBCUTANEOUS at 22:08

## 2019-02-26 NOTE — PROGRESS NOTE ADULT - PROBLEM SELECTOR PLAN 1
c/w vancomycin, monitor vanc trough.  initially on Meropenem but later discontinued after culture data.  blood cultures from 2/14 are negative.  ID f/u appreciated  cyclosporine temporarily held on admission, but now restarted. d/w ID, okay to restart. Previously seen by Dermatology. reconsulted but states no urgent issues. To c/w steroid sparing agent cyclosporine.   completed chronic steroids taper for pyoderma.   (she was on Prednisone 5 mg qdaily. doing well off steroids)  Per orthopedic surgery can be OOB primarily to wheelchair and can 50% weight bear on R leg if straight in immobilizer.  wound care consult and wound vac management appreciated.  LE edema likely due to IVF. will d/c IVF. Cr stable. PRN IVF if needed  LE dopplers to r/o DVT  *** TTE pending but most likely will not change fact that she will need PICC for 4-6 week of antibx  TTE without discrete vegetation  Given temp last night, will hold off PICC; if persistent fevers, may need to change to daptomycin as per ID

## 2019-02-26 NOTE — CONSULT NOTE ADULT - ASSESSMENT
77 yo F hx PMR, Pyoderma Gangrenosum (prior steroids, now on cyclosporine), s/p TAVR and explant of infected R Knee for MRSA (doxy suppression, spacer) admitted with fever to 102 and leukocytosis with bacteremia found to have YONATAN on CKD4

## 2019-02-26 NOTE — PROGRESS NOTE ADULT - ASSESSMENT
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  F/u blood cultures 2/14 are negative  New L LE DVT noted (IVC filter already in place)  leukocytosis, low grade fevers  Suggest:  Continue Vanco 1 g q 48, f/u level  anticipate 4-6 wk course via PICC  echo, ERIKA would be ideal, but will not , since she is a poor candidate for valve intervention or device explant   Follow temps and CBC/diff   Local wound care 77 yo F hx PMR, Pyoderma Gangrenosum (prior steroids, now on cyclosporine), s/p TAVR and explant of infected R Knee for MRSA (doxy suppression, spacer) admitted with fever to 102 and leukocytosis- S epi in 3 of 4 Bld Cxs  Prior S epi bacteremia Sept '18  Wounds all improving- wound care f/u appreciated- no exposed bone in any wounds  No obvious primary source for S epi at present- raises concern of either heart valve infection or PPM lead infection- ?seeding from prior episode  Her latest isolate is resistant to beta lactams and most second line agents, Vanco EDWARD 4.  F/u blood cultures 2/14 are negative  New L LE DVT noted (IVC filter already in place)  leukocytosis, low grade fevers  Suggest:  Continue Vanco 1 g q 48, f/u level  anticipate 4-6 wk course via PICC  echo results reviewed, ERIKA would be ideal, but will not , since she is a poor candidate for valve intervention or device explant   Follow temps and CBC/diff   Local wound care  if fever persists or breakthrough bacteremia, may need to switch to Daptomycin  overall prognosis seems poor

## 2019-02-26 NOTE — CONSULT NOTE ADULT - SUBJECTIVE AND OBJECTIVE BOX
[facility] Consult Note Nephrology - CONSULTATION NOTE  78F PMH of CAD s/p stent to LAD, AS s/p TAVR, PPM, DVT (Jan '18) on coumadin, IVC filter, pyoderma gangrenosum, hx MRSA infections, and total knee replacement c/b joint infections s/p I&D explantation and spacer placement followed by spacer removal and static spacer placement with joint fusion due to infection of initial spacer with complex wound closure by plastic surgery, admission here in November 2018 for hip and thigh wounds, now presenting with confusion and chills at home associated with poor appetite and confusion x 1 week and not eating x 2 days. Patient confused per aide, not remembering things she normally would. Has refused to eat food for two days citing poor appetite and has had poor mood though no suicidality. Patient with chronic pains of wounds of her hip/thigh as well as chronic R knee pain due to surgeries as above. There is some erythema surrounding the right knee that aide believes is somewhat worse than baseline over last 2-3 days but no exudates. Other wounds have been healing well per aide and she has wound vac on R hip and dressings in place on left thigh/glute. Patient has however also complained of chills at home. Denies any chest pains at this time, has not complained of this at home per aide. 	 Denies SOB at this time.    Renal consult called for kasia on ckd3- cr was around 1.1mg/dl to 1.2mg/dl but now steadily rising  pt does have urineoutput via external urinary catheter   has okay po intake; On vancomycin iv and cyclosporine po   denies any nausea or vomiting  does however have anasarca    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  Artificial cardiac pacemaker: 12/25/17  H/O cardiac catheterization: 11/29/16 HERBERT placed on LAD  H/O endoscopy: with dilatation of esophageal stricture 2013  S/P cataract surgery: x2; 3-4yr ago  S/P gastroplasty: 28 yrs ago  S/P cholecystectomy: more than 15 years ago  S/P total knee replacement: left, 15yr ago  S/P total knee replacement: right, 12yr ago  S/P hysterectomy: 24yr ago    amoxicillin (Other)  IV Contrast (Flushing (Skin); Nausea)    Home Medications Reviewed  Hospital Medications:   MEDICATIONS  (STANDING):  amLODIPine   Tablet 10 milliGRAM(s) Oral daily  ascorbic acid 500 milliGRAM(s) Oral daily  aspirin enteric coated 81 milliGRAM(s) Oral daily  atorvastatin 10 milliGRAM(s) Oral at bedtime  bisacodyl 5 milliGRAM(s) Oral at bedtime  calcium carbonate 1250 mG  + Vitamin D (OsCal 500 + D) 1 Tablet(s) Oral three times a day  cholecalciferol 1000 Unit(s) Oral daily  cycloSPORINE  (SandIMMUNE)  Solution 200 milliGRAM(s) Oral two times a day  escitalopram 5 milliGRAM(s) Oral daily  folic acid 1 milliGRAM(s) Oral daily  gabapentin 300 milliGRAM(s) Oral two times a day  heparin  Injectable 5000 Unit(s) SubCutaneous every 8 hours  melatonin 3 milliGRAM(s) Oral at bedtime  metoprolol tartrate 12.5 milliGRAM(s) Oral two times a day  polyethylene glycol 3350 17 Gram(s) Oral daily  sodium chloride 0.9%. 1000 milliLiter(s) (75 mL/Hr) IV Continuous <Continuous>  sodium chloride 0.9%. 1000 milliLiter(s) (75 mL/Hr) IV Continuous <Continuous>  vancomycin  IVPB 1000 milliGRAM(s) IV Intermittent every 48 hours  vitamin B complex with vitamin C 1 Tablet(s) Oral daily    SOCIAL HISTORY:  Denies ETOh,Smoking,   FAMILY HISTORY:  No pertinent family history in first degree relatives    REVIEW OF SYSTEMS:  CONSTITUTIONAL: +weakness  EYES/ENT: No visual changes;  No vertigo or throat pain   NECK: No pain or stiffness  RESPIRATORY: No cough, wheezing, hemoptysis; No shortness of breath  CARDIOVASCULAR: No chest pain or palpitations.  GASTROINTESTINAL: No abdominal or epigastric pain. No nausea, vomiting, or hematemesis; No diarrhea or constipation. No melena or hematochezia.  GENITOURINARY: No dysuria, frequency, foamy urine, urinary urgency, incontinence or hematuria  NEUROLOGICAL: No numbness or weakness  SKIN: No itching, burning, rashes, or lesions   VASCULAR: No bilateral lower extremity edema.   All other review of systems is negative unless indicated above.    VITALS:  T(F): 98.7 (02-26-19 @ 10:08), Max: 100.5 (02-25-19 @ 22:15)  HR: 80 (02-26-19 @ 10:08)  BP: 136/78 (02-26-19 @ 10:08)  RR: 18 (02-26-19 @ 10:08)  SpO2: 93% (02-26-19 @ 10:08)  Wt(kg): --    02-25 @ 07:01  -  02-26 @ 07:00  --------------------------------------------------------  IN: 240 mL / OUT: 200 mL / NET: 40 mL    02-26 @ 07:01  -  02-26 @ 13:30  --------------------------------------------------------  IN: 240 mL / OUT: 0 mL / NET: 240 mL        PHYSICAL EXAM:  Constitutional: NAD  HEENT: anicteric sclera, oropharynx clear, MMM  Neck: No JVD  Respiratory: b/l rhonchi  Cardiovascular: S1, S2, RRR  Gastrointestinal: BS+, soft, NT/ND  Extremities: ++ peripheral edema  Neurological: A/O x 3, no focal deficits  Psychiatric: Normal mood, normal affect  : No CVA tenderness. No lund.   Skin: No rashes      LABS:  02-26    138  |  105  |  46<H>  ----------------------------<  135<H>  5.3   |  20<L>  |  1.81<H>    Ca    9.0      26 Feb 2019 09:21      Creatinine Trend: 1.81 <--, 1.70 <--, 1.61 <--, 1.65 <--, 1.43 <--, 1.28 <--, 1.42 <--                        8.5    16.8  )-----------( 356      ( 26 Feb 2019 09:44 )             27.2     Urine Studies:      RADIOLOGY & ADDITIONAL STUDIES:

## 2019-02-26 NOTE — CHART NOTE - NSCHARTNOTEFT_GEN_A_CORE
Nutrition Follow Up Note  Patient seen for nutrition follow-up. 77 y/o female admitted for AMS, and decreased PO intake x2 days. Pt with cellulitis, metabolic encephalopathy secondary to sepsis, fevers, YONATAN on CKD3 and coag negative staph bacteremia. Pt with wounds on R buttocks, R hip, and stage III pressure injury on right hip.  PMHx: CAD, s/p stent, s/p TAVR, PPM, DVT, IVC filter, CKD3    Source: medical record and aide at bedside, previous RD notes pt was asleep at time of interview    Diet :  DASH/TLC    Pt's aide at bedside, Gladis, reports pt has decrease PO intake x3 weeks and has recently been consuming ~5% of meals in-house. Pt seems to prefer liquids, and spits out solid food. Pt is taking a few spoonfuls of water with Howard daily, and Gladis gives her Nepro. Food preferences were obtained, pt is more likely to consume soup, cream of wheat and ice-cream/milkshake beverages, preferences honored. Gladis is aware of pt's increased protein needs.     PO intake : poor, <25%     Source for PO intake: Aide at bedside      Weight Hx:   259.7 pounds (2/20)  265 pounds in 01/2019 per RD note on 2/14  275 pounds (11/2018)- per chart  253 pounds (10/2018)- per chart  Noted ~5 pound wt loss in one month. Weight may also be masked by fluid accumulation; +3 L/R leg, +2 L/R arm and +1 generalized edema    Pertinent Medications: MEDICATIONS  (STANDING):  amLODIPine   Tablet 10 milliGRAM(s) Oral daily  ascorbic acid 500 milliGRAM(s) Oral daily  aspirin enteric coated 81 milliGRAM(s) Oral daily  atorvastatin 10 milliGRAM(s) Oral at bedtime  bisacodyl 5 milliGRAM(s) Oral at bedtime  calcium carbonate 1250 mG  + Vitamin D (OsCal 500 + D) 1 Tablet(s) Oral three times a day  cholecalciferol 1000 Unit(s) Oral daily  cycloSPORINE  (SandIMMUNE)  Solution 200 milliGRAM(s) Oral two times a day  escitalopram 5 milliGRAM(s) Oral daily  folic acid 1 milliGRAM(s) Oral daily  gabapentin 300 milliGRAM(s) Oral two times a day  heparin  Injectable 5000 Unit(s) SubCutaneous every 8 hours  melatonin 3 milliGRAM(s) Oral at bedtime  metoprolol tartrate 12.5 milliGRAM(s) Oral two times a day  polyethylene glycol 3350 17 Gram(s) Oral daily  sodium chloride 0.9%. 1000 milliLiter(s) (75 mL/Hr) IV Continuous <Continuous>  sodium chloride 0.9%. 1000 milliLiter(s) (75 mL/Hr) IV Continuous <Continuous>  vancomycin  IVPB 1000 milliGRAM(s) IV Intermittent every 48 hours  vitamin B complex with vitamin C 1 Tablet(s) Oral daily    MEDICATIONS  (PRN):  acetaminophen   Tablet .. 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  Edema: +3 L/R leg, +2 L/R arm and +1 generalized     Estimated Needs:   [x] no change since previous assessment  [ ] recalculated:     Previous Nutrition Diagnosis: Increased nutrient needs  Nutrition Diagnosis is: ongoing- being addressed with Howard x2 daily    New Nutrition Diagnosis: n/a  Related to:    As evidenced by:     Interventions:     Recommend  1) Add chocolate Ensure x2 daily to increase PO intake- indicated for pt's poor PO intake and Potassium and Phosphorus values WNL  2) Food preferences obtained- continue to honor preferences    Monitoring and Evaluation:     Continue to monitor Nutritional intake, Tolerance to diet prescription, weights, labs, skin integrity    RD remains available upon request and will follow up per protocol

## 2019-02-26 NOTE — PROGRESS NOTE ADULT - SUBJECTIVE AND OBJECTIVE BOX
100.5 temp last night  Retained more than 500 cc on bladder scan  Navarrete inserted    Vital Signs Last 24 Hrs  T(C): 37.3 (26 Feb 2019 16:53), Max: 38.1 (25 Feb 2019 22:15)  T(F): 99.1 (26 Feb 2019 16:53), Max: 100.5 (25 Feb 2019 22:15)  HR: 85 (26 Feb 2019 16:53) (80 - 90)  BP: 91/68 (26 Feb 2019 16:53) (91/68 - 140/75)  BP(mean): --  RR: 20 (26 Feb 2019 16:53) (18 - 24)  SpO2: 93% (26 Feb 2019 16:53) (93% - 95%)    GENERAL: NAD, lying in bed  HEAD:  Atraumatic, Normocephalic  EYES: EOMI, PERRLA, conjunctiva and sclera clear  NECK: Supple, No JVD  CHEST/LUNG: mild decrease breath sounds bilaterally; No wheeze   HEART: Regular rate and rhythm; No murmurs, rubs, or gallops  ABDOMEN: Soft, Nontender, Nondistended; Bowel sounds present  Neuro: AAO x 2, no focal deficit  EXTREMITIES:  2+ Peripheral Pulses, No clubbing, cyanosis.  1+ nonpitting edema LE  Back: sacral and thigh wound. +wound vac in place  SKIN: No rashes or lesions    LABS:                        8.5    16.8  )-----------( 356      ( 26 Feb 2019 09:44 )             27.2     02-26    138  |  105  |  46<H>  ----------------------------<  135<H>  5.3   |  20<L>  |  1.81<H>    Ca    9.0      26 Feb 2019 09:21        CAPILLARY BLOOD GLUCOSE

## 2019-02-26 NOTE — PROGRESS NOTE ADULT - SUBJECTIVE AND OBJECTIVE BOX
CC: f/u for coag negative staph bacteremia    Pt is resting in bed, mostly bedbound, poor PO intake as per aide  fever last night 100.5 noted    REVIEW OF SYSTEMS: as above, limited  All other review of systems negative (Comprehensive ROS)    Antimicrobials Day # 14  vancomycin  IVPB 1000 milliGRAM(s) IV Intermittent every 48 hours    Medications Reviewed    Vital Signs Last 24 Hrs  T(C): 36.7 (26 Feb 2019 06:16), Max: 38.1 (25 Feb 2019 22:15)  T(F): 98 (26 Feb 2019 06:16), Max: 100.5 (25 Feb 2019 22:15)  HR: 84 (26 Feb 2019 06:16) (84 - 97)  BP: 140/75 (26 Feb 2019 06:16) (105/64 - 140/75)  BP(mean): --  RR: 20 (26 Feb 2019 06:16) (18 - 24)  SpO2: 93% (26 Feb 2019 06:16) (93% - 96%)    PHYSICAL EXAM:  General: no acute distress  Eyes:  anicteric, no conjunctival injection, no discharge  Oropharynx: no lesions or injection 	  Neck: supple, without adenopathy  Lungs: clear to auscultation  Heart: regular rate and rhythm; no murmur, rubs or gallops  Abdomen: soft, nondistended, nontender, without mass or organomegaly  Skin: L flank wounds dressed; R hip VAC, R thigh and knee/leg wounds healed  Extremities: mild edema  Neurologic: alert, generalized weakness    LAB RESULTS:                                02-25    138  |  109<H>  |  46<H>  ----------------------------<  120<H>  6.0<H>   |  17<L>  |  1.70<H>    Ca    8.9      25 Feb 2019 21:54          MICROBIOLOGY:  RECENT CULTURES reviewed    RADIOLOGY REVIEWED

## 2019-02-26 NOTE — CONSULT NOTE ADULT - PROBLEM SELECTOR RECOMMENDATION 9
ddx includes pre renal vs atn vs obstruction vs acute gn from cellulitis vscongestive kidney disease  check ua  check urine na/cr/cl/osm/k  check urine pro/cr  check renal us  check vanco level  check c3 c3  check cyclosporine level, 30 min prior to am dose  avoid nephrotoxins  trend bmp

## 2019-02-26 NOTE — CHART NOTE - NSCHARTNOTEFT_GEN_A_CORE
C/C: Temp 100.5. Pt minimally verbal. Aide at bedside states she is not oriented and does not answer appropriately most of the time. Pt reports pain, but unable to say wear. Denies abdominal pain or leg pain. Unable to review rest of systems.      VITAL SIGNS:   T(C): 38.1 (02-25-19 @ 22:15), Max: 38.1 (02-25-19 @ 22:15)  HR: 90 (02-25-19 @ 22:15)  BP: 139/73 (02-25-19 @ 22:15)  RR: 24 (02-25-19 @ 22:15)  SpO2: 94% (02-25-19 @ 22:15)            LABS:                        8.0    15.44 )-----------( 394      ( 24 Feb 2019 09:05 )             26.9     02-25    138  |  109<H>  |  46<H>  ----------------------------<  120<H>  6.0<H>   |  17<L>  |  1.70<H>    Ca    8.9      25 Feb 2019 21:54             CULTURES:  Culture Results:   Growth in aerobic bottle: Staphylococcus epidermidis (02.12.19 @ 15:55)  Gram Stain:   Growth in aerobic bottle: Gram Positive Cocci in Clusters  Growth in anaerobic bottle: Gram Positive Cocci in Clusters (02.12.19 @ 15:55)        RADIOLOGY RESULTS:  < from: Xray Chest 1 View- PORTABLE-Urgent (02.12.19 @ 12:33) >    Impression:    The heart is enlarged. Elevation right hemidiaphragm. The lungs appear to   be clear. No radiographic evidence for congestive heart failure. A pacer   is in good position. No change in the appearance of the chest when   compared to previous study done November 12, 2018.          PHYSICAL EXAM:  General: Sleepy, easily arousable. Answers to name when called. Answers appropriately at times.  HEENT: mucous membranes dry.   CV: S1, S2 present, RRR, no JVD  Pulm: Respirations non-labored and equal. diminished at bases.   GI: Abdominal soft, non-tender, +BS x 4  : No CVA tenderness, voiding without difficulty  Ext: Right hip wound vac in place. No leakage. b/l lower ext in boots. +pedal pulses.   Neuro: AAO x 1    A/P:  78 F PMH of Pyoderma Gangrenosum. s/p TKR c/b multiple infections from knee hardware, CAD s/p stent to LAD, severe AS s/p TAVR, PPM, DVT, IVC filter, pyoderma gangrenosum, hx MRSA infections admit for poor appetite and confusion. Admit for Cellulitis RLE and YONATAN.  1) Fever  - RLE cellulitis on Vanco IV. last febrile 2/14. Echo inconclusive for vegetation. Will need ERIKA  - Repeat Blood cultures  - Tylenol x 1  - c/w Vanco, follow trough  - Repeat CXR r/o consolidation  - f/u with Infectious disease  2) Hyperkalemia  - Repeat K=6.0. EKG without peaked T-waves  - Kayexalate 30mg PO x 1  - Repeat BMP in AM  - f/u with primary NP in AM        TOM HENSON-C  Spectra Link # [id number]

## 2019-02-26 NOTE — PROGRESS NOTE ADULT - ATTENDING COMMENTS
Episode of loose stool this am  Remains non cooperative with exams  With maximal assistance turned for exam  Right lateral thigh wounds continue to improve - VAC currently can be held- Aquacel placed  Left lateral thigh wounds are not as extensive- stable , and amenable to local dressing changes  Continue Cylcosporin  last blood C&S- NG

## 2019-02-27 LAB
ANION GAP SERPL CALC-SCNC: 13 MMOL/L — SIGNIFICANT CHANGE UP (ref 5–17)
BUN SERPL-MCNC: 47 MG/DL — HIGH (ref 7–23)
CALCIUM SERPL-MCNC: 8.5 MG/DL — SIGNIFICANT CHANGE UP (ref 8.4–10.5)
CHLORIDE SERPL-SCNC: 108 MMOL/L — SIGNIFICANT CHANGE UP (ref 96–108)
CO2 SERPL-SCNC: 20 MMOL/L — LOW (ref 22–31)
CREAT ?TM UR-MCNC: 229 MG/DL — SIGNIFICANT CHANGE UP
CREAT SERPL-MCNC: 1.71 MG/DL — HIGH (ref 0.5–1.3)
CYCLOSPORINE SER-MCNC: 370 NG/ML — SIGNIFICANT CHANGE UP (ref 150–400)
GLUCOSE SERPL-MCNC: 132 MG/DL — HIGH (ref 70–99)
NT-PROBNP SERPL-SCNC: 1481 PG/ML — HIGH (ref 0–300)
POTASSIUM SERPL-MCNC: 5.1 MMOL/L — SIGNIFICANT CHANGE UP (ref 3.5–5.3)
POTASSIUM SERPL-SCNC: 5.1 MMOL/L — SIGNIFICANT CHANGE UP (ref 3.5–5.3)
PROT ?TM UR-MCNC: 40 MG/DL — HIGH (ref 0–12)
PROT/CREAT UR-RTO: 0.2 RATIO — SIGNIFICANT CHANGE UP (ref 0–0.2)
SODIUM SERPL-SCNC: 141 MMOL/L — SIGNIFICANT CHANGE UP (ref 135–145)

## 2019-02-27 PROCEDURE — 76770 US EXAM ABDO BACK WALL COMP: CPT | Mod: 26

## 2019-02-27 RX ADMIN — OXYCODONE AND ACETAMINOPHEN 1 TABLET(S): 5; 325 TABLET ORAL at 08:28

## 2019-02-27 RX ADMIN — OXYCODONE AND ACETAMINOPHEN 1 TABLET(S): 5; 325 TABLET ORAL at 18:14

## 2019-02-27 RX ADMIN — AMLODIPINE BESYLATE 10 MILLIGRAM(S): 2.5 TABLET ORAL at 06:26

## 2019-02-27 RX ADMIN — Medication 5 MILLIGRAM(S): at 22:20

## 2019-02-27 RX ADMIN — Medication 12.5 MILLIGRAM(S): at 09:46

## 2019-02-27 RX ADMIN — Medication 1 TABLET(S): at 11:40

## 2019-02-27 RX ADMIN — POLYETHYLENE GLYCOL 3350 17 GRAM(S): 17 POWDER, FOR SOLUTION ORAL at 11:39

## 2019-02-27 RX ADMIN — GABAPENTIN 300 MILLIGRAM(S): 400 CAPSULE ORAL at 06:26

## 2019-02-27 RX ADMIN — CYCLOSPORINE 200 MILLIGRAM(S): 100 CAPSULE ORAL at 09:47

## 2019-02-27 RX ADMIN — OXYCODONE AND ACETAMINOPHEN 1 TABLET(S): 5; 325 TABLET ORAL at 07:02

## 2019-02-27 RX ADMIN — HEPARIN SODIUM 5000 UNIT(S): 5000 INJECTION INTRAVENOUS; SUBCUTANEOUS at 22:18

## 2019-02-27 RX ADMIN — Medication 1 MILLIGRAM(S): at 11:40

## 2019-02-27 RX ADMIN — ATORVASTATIN CALCIUM 10 MILLIGRAM(S): 80 TABLET, FILM COATED ORAL at 22:18

## 2019-02-27 RX ADMIN — Medication 650 MILLIGRAM(S): at 09:46

## 2019-02-27 RX ADMIN — Medication 500 MILLIGRAM(S): at 11:40

## 2019-02-27 RX ADMIN — OXYCODONE AND ACETAMINOPHEN 1 TABLET(S): 5; 325 TABLET ORAL at 19:03

## 2019-02-27 RX ADMIN — Medication 1 TABLET(S): at 22:18

## 2019-02-27 RX ADMIN — CYCLOSPORINE 200 MILLIGRAM(S): 100 CAPSULE ORAL at 22:19

## 2019-02-27 RX ADMIN — Medication 650 MILLIGRAM(S): at 11:04

## 2019-02-27 RX ADMIN — Medication 1 TABLET(S): at 06:26

## 2019-02-27 RX ADMIN — Medication 1000 UNIT(S): at 11:40

## 2019-02-27 RX ADMIN — Medication 12.5 MILLIGRAM(S): at 17:37

## 2019-02-27 RX ADMIN — ESCITALOPRAM OXALATE 5 MILLIGRAM(S): 10 TABLET, FILM COATED ORAL at 11:40

## 2019-02-27 RX ADMIN — Medication 81 MILLIGRAM(S): at 11:40

## 2019-02-27 RX ADMIN — GABAPENTIN 300 MILLIGRAM(S): 400 CAPSULE ORAL at 17:37

## 2019-02-27 RX ADMIN — HEPARIN SODIUM 5000 UNIT(S): 5000 INJECTION INTRAVENOUS; SUBCUTANEOUS at 14:22

## 2019-02-27 RX ADMIN — HEPARIN SODIUM 5000 UNIT(S): 5000 INJECTION INTRAVENOUS; SUBCUTANEOUS at 06:24

## 2019-02-27 NOTE — PROGRESS NOTE ADULT - PROBLEM SELECTOR PLAN 1
Reviewed images and labs  Noted low FeNa, no RBCs in urine, B/L Pleural effusions, Low Alb,  Unlikely to have ATN or GN with above results  Pending Cyclosporine level (to be drawn 1hrs prior to AM dose) and repeat C3/C4  Presentation most consistent with Pre-Renal or Cardiorenal, patient with pleural effusions and anasarca but no prior H/O CHF.  Please repeat Pro-BNP  Continue IVF as above for now  Avoid nephrotoxins  Renal diet  Daily BMP

## 2019-02-27 NOTE — PROGRESS NOTE ADULT - PROBLEM SELECTOR PLAN 1
c/w vancomycin, monitor vanc trough.  initially on Meropenem but later discontinued after culture data.  blood cultures from 2/14 are negative.  ID f/u appreciated  cyclosporine temporarily held on admission, but now restarted. d/w ID, okay to restart. Previously seen by Dermatology. reconsulted but states no urgent issues. To c/w steroid sparing agent cyclosporine. check levels (sent, pending results)  completed chronic steroids taper for pyoderma.   (she was on Prednisone 5 mg qdaily)  Per orthopedic surgery can be OOB primarily to wheelchair and can 50% weight bear on R leg if straight in immobilizer.  wound care consult and wound vac management appreciated.  LE edema likely due to IVF. will d/c IVF. Cr stable. PRN IVF if needed  LE dopplers to r/o DVT  TTE without discrete vegetation  ERIKA pending but likely will not change fact that she will need PICC for 4-6 week of antibx. will not do ERIKA give risk.   Given temp last night, will hold off PICC; if persistent fevers, may need to change to daptomycin as per ID  monitor blood cultures.

## 2019-02-27 NOTE — PROGRESS NOTE ADULT - ASSESSMENT
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  F/u blood cultures 2/14 are negative  New L LE DVT noted (IVC filter already in place)  leukocytosis, low grade fevers  Suggest:  Continue Vanco 1 g q 48, f/u level  anticipate 4-6 wk course via PICC  echo results reviewed, ERIKA would be ideal, but unlikely , since she is a poor candidate for valve intervention or device explant   Follow temps and CBC/diff   Local wound care  if fever persists or breakthrough bacteremia, may need to switch to Daptomycin  f/u repeat blood cult  overall prognosis seems poor 77 yo F hx PMR, Pyoderma Gangrenosum (prior steroids, now on cyclosporine), s/p TAVR and explant of infected R Knee for MRSA (doxy suppression, spacer) admitted with fever to 102 and leukocytosis- S epi in 3 of 4 Bld Cxs  Prior S epi bacteremia Sept '18  Wounds all improving- wound care f/u appreciated- no exposed bone in any wounds  No obvious primary source for S epi at present- raises concern of either heart valve infection or PPM lead infection- ?seeding from prior episode  Her latest isolate is resistant to beta lactams and most second line agents, Vanco EDWARD 4.  F/u blood cultures 2/14 are negative  New L LE DVT noted (IVC filter already in place)  leukocytosis, low grade fevers  Suggest:  Continue Vanco 1 g q 48, level in therapeutic range  anticipate 4-6 wk course via PICC  echo results reviewed, ERIKA would be ideal, but unlikely , since she is a poor candidate for valve intervention or device explant   Follow temps and CBC/diff   Local wound care  if fever persists or breakthrough bacteremia, may need to switch to Daptomycin  f/u repeat blood cult  overall prognosis seems poor  d/w RN

## 2019-02-27 NOTE — PROGRESS NOTE ADULT - SUBJECTIVE AND OBJECTIVE BOX
[facility] - KIRTI Castillo / KIRTI Gracia / RITA Daniels/ KIRTI Zamudio/ KIRTI Irizarry/ LOLLY Hays / CLINT Forbes / LEBRON Boss  ---------------------------------------------------------------------------------------------------------------  seen and examined today for YONATAN  Interval : Serum creatinine stable  VITALS:  T(F): 98.8 (02-27-19 @ 09:44), Max: 99.6 (02-26-19 @ 16:03)  HR: 93 (02-27-19 @ 09:44)  BP: 122/68 (02-27-19 @ 09:44)  RR: 20 (02-27-19 @ 09:44)  SpO2: 95% (02-27-19 @ 09:44)  Wt(kg): --    02-26 @ 07:01  -  02-27 @ 07:00  --------------------------------------------------------  IN: 600 mL / OUT: 1050 mL / NET: -450 mL      Physical Exam :-  Constitutional: NAD, morbid obesity  Neck: Supple.  Respiratory: Bilateral equal breath sounds, crackles at bases  Cardiovascular: S1, S2 normal,  Gastrointestinal: Bowel Sounds present, soft, non tender.  Extremities: Anasarca 2+  Neurological: Alert and Oriented x 3, no focal deficits  Psychiatric: Withdrawn  Data:-  Allergies :   amoxicillin (Other)  IV Contrast (Flushing (Skin); Nausea)    Hospital Medications:   MEDICATIONS  (STANDING):  amLODIPine   Tablet 10 milliGRAM(s) Oral daily  ascorbic acid 500 milliGRAM(s) Oral daily  aspirin enteric coated 81 milliGRAM(s) Oral daily  atorvastatin 10 milliGRAM(s) Oral at bedtime  bisacodyl 5 milliGRAM(s) Oral at bedtime  calcium carbonate 1250 mG  + Vitamin D (OsCal 500 + D) 1 Tablet(s) Oral three times a day  cholecalciferol 1000 Unit(s) Oral daily  cycloSPORINE  (SandIMMUNE)  Solution 200 milliGRAM(s) Oral two times a day  escitalopram 5 milliGRAM(s) Oral daily  folic acid 1 milliGRAM(s) Oral daily  gabapentin 300 milliGRAM(s) Oral two times a day  heparin  Injectable 5000 Unit(s) SubCutaneous every 8 hours  melatonin 3 milliGRAM(s) Oral at bedtime  metoprolol tartrate 12.5 milliGRAM(s) Oral two times a day  polyethylene glycol 3350 17 Gram(s) Oral daily  sodium chloride 0.9%. 1000 milliLiter(s) (75 mL/Hr) IV Continuous <Continuous>  sodium chloride 0.9%. 1000 milliLiter(s) (75 mL/Hr) IV Continuous <Continuous>  vancomycin  IVPB 1000 milliGRAM(s) IV Intermittent every 48 hours  vitamin B complex with vitamin C 1 Tablet(s) Oral daily    02-27    141  |  108  |  47<H>  ----------------------------<  132<H>  5.1   |  20<L>  |  1.71<H>    Ca    8.5      27 Feb 2019 08:52      Creatinine Trend: 1.71 <--, 1.81 <--, 1.70 <--, 1.61 <--, 1.65 <--, 1.43 <--, 1.28 <--                        7.8    17.7  )-----------( 310      ( 27 Feb 2019 08:57 )             24.4

## 2019-02-27 NOTE — PROGRESS NOTE ADULT - SUBJECTIVE AND OBJECTIVE BOX
CC: f/u for coag negative staph bacteremia    Pt is resting in bed, mostly bedbound, poor PO intake as per aide  no fevers    REVIEW OF SYSTEMS: as above, limited  All other review of systems negative (Comprehensive ROS)    Antimicrobials Day # 15  vancomycin  IVPB 1000 milliGRAM(s) IV Intermittent every 48 hours    Medications Reviewed    Vital Signs Last 24 Hrs  T(C): 37.3 (26 Feb 2019 21:49), Max: 37.6 (26 Feb 2019 16:03)  T(F): 99.2 (26 Feb 2019 21:49), Max: 99.6 (26 Feb 2019 16:03)  HR: 98 (27 Feb 2019 06:08) (80 - 98)  BP: 118/66 (27 Feb 2019 06:08) (91/68 - 170/71)  BP(mean): --  RR: 20 (26 Feb 2019 21:49) (18 - 20)  SpO2: 95% (26 Feb 2019 21:49) (93% - 95%)    PHYSICAL EXAM:  General: no acute distress  Eyes:  anicteric, no conjunctival injection, no discharge  Oropharynx: no lesions or injection 	  Neck: supple, without adenopathy  Lungs: clear to auscultation  Heart: regular rate and rhythm; no murmur, rubs or gallops  Abdomen: soft, nondistended, nontender, without mass or organomegaly  Skin: L flank wounds dressed; R hip VAC, R thigh and knee/leg wounds healed  Extremities: mild edema  Neurologic: alert, generalized weakness    LAB RESULTS:                                            7.8    17.7  )-----------( 310      ( 27 Feb 2019 08:57 )             24.4   02-27    141  |  108  |  47<H>  ----------------------------<  132<H>  5.1   |  20<L>  |  1.71<H>    Ca    8.5      27 Feb 2019 08:52        MICROBIOLOGY:  RECENT CULTURES reviewed    RADIOLOGY REVIEWED CC: f/u for coag negative staph bacteremia    Pt is resting in bed, mostly bedbound, poor PO intake as per aide  no fevers  Wound vac now removed    REVIEW OF SYSTEMS: as above, limited  All other review of systems negative (Comprehensive ROS)    Antimicrobials Day # 15  vancomycin  IVPB 1000 milliGRAM(s) IV Intermittent every 48 hours    Medications Reviewed    Vital Signs Last 24 Hrs  T(C): 37.3 (26 Feb 2019 21:49), Max: 37.6 (26 Feb 2019 16:03)  T(F): 99.2 (26 Feb 2019 21:49), Max: 99.6 (26 Feb 2019 16:03)  HR: 98 (27 Feb 2019 06:08) (80 - 98)  BP: 118/66 (27 Feb 2019 06:08) (91/68 - 170/71)  BP(mean): --  RR: 20 (26 Feb 2019 21:49) (18 - 20)  SpO2: 95% (26 Feb 2019 21:49) (93% - 95%)    PHYSICAL EXAM:  General: no acute distress  Eyes:  anicteric, no conjunctival injection, no discharge  Oropharynx: no lesions or injection 	  Neck: supple, without adenopathy  Lungs: clear to auscultation  Heart: regular rate and rhythm; no murmur, rubs or gallops  Abdomen: soft, nondistended, nontender, without mass or organomegaly  Skin: L flank wounds dressed; R hip VAC--now removed, R thigh and knee/leg wounds healed  Extremities: mild edema  Neurologic: alert, generalized weakness    LAB RESULTS:                                            7.8    17.7  )-----------( 310      ( 27 Feb 2019 08:57 )             24.4   02-27    141  |  108  |  47<H>  ----------------------------<  132<H>  5.1   |  20<L>  |  1.71<H>    Ca    8.5      27 Feb 2019 08:52        MICROBIOLOGY:  RECENT CULTURES reviewed    RADIOLOGY REVIEWED

## 2019-02-27 NOTE — PROGRESS NOTE ADULT - SUBJECTIVE AND OBJECTIVE BOX
Patient is a 78y old  Female who presents with a chief complaint of Cellulitis (27 Feb 2019 10:52)      SUBJECTIVE / OVERNIGHT EVENTS:  no events.  the aide at bedside.  mental status a little better.  gross fluid anasarca   not eating much.   denied cp, sob.       Vital Signs Last 24 Hrs  T(C): 37.1 (27 Feb 2019 09:44), Max: 37.3 (26 Feb 2019 16:53)  T(F): 98.8 (27 Feb 2019 09:44), Max: 99.2 (26 Feb 2019 21:49)  HR: 93 (27 Feb 2019 09:44) (85 - 98)  BP: 122/68 (27 Feb 2019 09:44) (91/68 - 170/71)  BP(mean): --  RR: 20 (27 Feb 2019 09:44) (20 - 20)  SpO2: 95% (27 Feb 2019 09:44) (93% - 95%)  I&O's Summary    26 Feb 2019 07:01  -  27 Feb 2019 07:00  --------------------------------------------------------  IN: 600 mL / OUT: 1050 mL / NET: -450 mL        PHYSICAL EXAM:  GENERAL: NAD, lying in bed, sleepy  HEAD:  Atraumatic, Normocephalic  EYES: EOMI, PERRLA, conjunctiva and sclera clear  NECK: Supple, No JVD  CHEST/LUNG: mild decrease breath sounds bilaterally; No wheeze   HEART: Regular rate and rhythm; No murmurs, rubs, or gallops  ABDOMEN: Soft, Nontender, Nondistended; Bowel sounds present  Neuro: AAO x 2, no focal deficit  EXTREMITIES:  2+ Peripheral Pulses, No clubbing, cyanosis.  2+ nonpitting edema LE  Back: sacral and thigh wound. +wound vac in place  SKIN: No rashes or lesions    LABS:                        7.8    17.7  )-----------( 310      ( 27 Feb 2019 08:57 )             24.4     02-27    141  |  108  |  47<H>  ----------------------------<  132<H>  5.1   |  20<L>  |  1.71<H>    Ca    8.5      27 Feb 2019 08:52        CAPILLARY BLOOD GLUCOSE            Urinalysis Basic - ( 26 Feb 2019 18:27 )    Color: x / Appearance: x / SG: x / pH: x  Gluc: x / Ketone: x  / Bili: x / Urobili: x   Blood: x / Protein: x / Nitrite: x   Leuk Esterase: x / RBC: 1 /hpf / WBC 7 /HPF   Sq Epi: x / Non Sq Epi: 1 / Bacteria: Occasional        RADIOLOGY & ADDITIONAL TESTS:    Imaging Personally Reviewed:  [x] YES  [ ] NO    Consultant(s) Notes Reviewed:  [x] YES  [ ] NO      MEDICATIONS  (STANDING):  amLODIPine   Tablet 10 milliGRAM(s) Oral daily  ascorbic acid 500 milliGRAM(s) Oral daily  aspirin enteric coated 81 milliGRAM(s) Oral daily  atorvastatin 10 milliGRAM(s) Oral at bedtime  bisacodyl 5 milliGRAM(s) Oral at bedtime  calcium carbonate 1250 mG  + Vitamin D (OsCal 500 + D) 1 Tablet(s) Oral three times a day  cholecalciferol 1000 Unit(s) Oral daily  cycloSPORINE  (SandIMMUNE)  Solution 200 milliGRAM(s) Oral two times a day  escitalopram 5 milliGRAM(s) Oral daily  folic acid 1 milliGRAM(s) Oral daily  gabapentin 300 milliGRAM(s) Oral two times a day  heparin  Injectable 5000 Unit(s) SubCutaneous every 8 hours  melatonin 3 milliGRAM(s) Oral at bedtime  metoprolol tartrate 12.5 milliGRAM(s) Oral two times a day  polyethylene glycol 3350 17 Gram(s) Oral daily  vancomycin  IVPB 1000 milliGRAM(s) IV Intermittent every 48 hours  vitamin B complex with vitamin C 1 Tablet(s) Oral daily    MEDICATIONS  (PRN):  acetaminophen   Tablet .. 650 milliGRAM(s) Oral every 6 hours PRN Mild Pain (1 - 3)  oxyCODONE    5 mG/acetaminophen 325 mG 1 Tablet(s) Oral every 4 hours PRN Severe Pain (7 - 10)      Care Discussed with Consultants/Other Providers [x] YES  [ ] NO    HEALTH ISSUES - PROBLEM Dx:  Hyperkalemia: Hyperkalemia  Acute kidney injury superimposed on CKD: Acute kidney injury superimposed on CKD  Acute kidney failure: Acute kidney failure  Pyoderma gangrenosum: Pyoderma gangrenosum  Depression, unspecified depression type: Depression, unspecified depression type  Medication management: Medication management  Gastroesophageal reflux disease, esophagitis presence not specified: Gastroesophageal reflux disease, esophagitis presence not specified  Aortic stenosis, severe: Aortic stenosis, severe  Coronary artery disease involving native coronary artery of native heart without angina pectoris: Coronary artery disease involving native coronary artery of native heart without angina pectoris  Altered mental status, unspecified altered mental status type: Altered mental status, unspecified altered mental status type  Cellulitis of leg, right: Cellulitis of leg, right

## 2019-02-27 NOTE — PROGRESS NOTE ADULT - ASSESSMENT
79 yo F hx PMR, Pyoderma Gangrenosum (prior steroids, now on cyclosporine), s/p TAVR and explant of infected R Knee for MRSA (doxy suppression, spacer) admitted with fever to 102 and leukocytosis with bacteremia found to have YONATAN on CKD4

## 2019-02-28 LAB
ANION GAP SERPL CALC-SCNC: 14 MMOL/L — SIGNIFICANT CHANGE UP (ref 5–17)
BASOPHILS # BLD AUTO: 0 K/UL — SIGNIFICANT CHANGE UP (ref 0–0.2)
BASOPHILS NFR BLD AUTO: 0.1 % — SIGNIFICANT CHANGE UP (ref 0–2)
BUN SERPL-MCNC: 52 MG/DL — HIGH (ref 7–23)
C3 SERPL-MCNC: 145 MG/DL — SIGNIFICANT CHANGE UP (ref 81–157)
CALCIUM SERPL-MCNC: 8.6 MG/DL — SIGNIFICANT CHANGE UP (ref 8.4–10.5)
CHLORIDE SERPL-SCNC: 105 MMOL/L — SIGNIFICANT CHANGE UP (ref 96–108)
CO2 SERPL-SCNC: 19 MMOL/L — LOW (ref 22–31)
CREAT SERPL-MCNC: 1.82 MG/DL — HIGH (ref 0.5–1.3)
EOSINOPHIL # BLD AUTO: 0.2 K/UL — SIGNIFICANT CHANGE UP (ref 0–0.5)
EOSINOPHIL NFR BLD AUTO: 1.2 % — SIGNIFICANT CHANGE UP (ref 0–6)
GLUCOSE SERPL-MCNC: 104 MG/DL — HIGH (ref 70–99)
HCT VFR BLD CALC: 26.3 % — LOW (ref 34.5–45)
HGB BLD-MCNC: 8.5 G/DL — LOW (ref 11.5–15.5)
LG PLATELETS BLD QL AUTO: SLIGHT — SIGNIFICANT CHANGE UP
LYMPHOCYTES # BLD AUTO: 15.8 % — SIGNIFICANT CHANGE UP (ref 13–44)
LYMPHOCYTES # BLD AUTO: 3.1 K/UL — SIGNIFICANT CHANGE UP (ref 1–3.3)
MCHC RBC-ENTMCNC: 27.6 PG — SIGNIFICANT CHANGE UP (ref 27–34)
MCHC RBC-ENTMCNC: 32.4 GM/DL — SIGNIFICANT CHANGE UP (ref 32–36)
MCV RBC AUTO: 85.2 FL — SIGNIFICANT CHANGE UP (ref 80–100)
MONOCYTES # BLD AUTO: 0.9 K/UL — SIGNIFICANT CHANGE UP (ref 0–0.9)
MONOCYTES NFR BLD AUTO: 4.5 % — SIGNIFICANT CHANGE UP (ref 2–14)
NEUTROPHILS # BLD AUTO: 15.4 K/UL — HIGH (ref 1.8–7.4)
NEUTROPHILS NFR BLD AUTO: 78.4 % — HIGH (ref 43–77)
PLAT MORPH BLD: NORMAL — SIGNIFICANT CHANGE UP
PLATELET # BLD AUTO: 306 K/UL — SIGNIFICANT CHANGE UP (ref 150–400)
POTASSIUM SERPL-MCNC: 5.4 MMOL/L — HIGH (ref 3.5–5.3)
POTASSIUM SERPL-SCNC: 5.4 MMOL/L — HIGH (ref 3.5–5.3)
RBC # BLD: 3.08 M/UL — LOW (ref 3.8–5.2)
RBC # FLD: 16.8 % — HIGH (ref 10.3–14.5)
RBC BLD AUTO: SIGNIFICANT CHANGE UP
SODIUM SERPL-SCNC: 138 MMOL/L — SIGNIFICANT CHANGE UP (ref 135–145)
WBC # BLD: 19.7 K/UL — HIGH (ref 3.8–10.5)
WBC # FLD AUTO: 19.7 K/UL — HIGH (ref 3.8–10.5)

## 2019-02-28 RX ORDER — FUROSEMIDE 40 MG
20 TABLET ORAL
Qty: 0 | Refills: 0 | Status: DISCONTINUED | OUTPATIENT
Start: 2019-02-28 | End: 2019-03-03

## 2019-02-28 RX ORDER — SODIUM BICARBONATE 1 MEQ/ML
650 SYRINGE (ML) INTRAVENOUS THREE TIMES A DAY
Qty: 0 | Refills: 0 | Status: DISCONTINUED | OUTPATIENT
Start: 2019-02-28 | End: 2019-03-02

## 2019-02-28 RX ORDER — NYSTATIN 500MM UNIT
500000 POWDER (EA) MISCELLANEOUS THREE TIMES A DAY
Qty: 0 | Refills: 0 | Status: DISCONTINUED | OUTPATIENT
Start: 2019-02-28 | End: 2019-03-25

## 2019-02-28 RX ORDER — OXYCODONE AND ACETAMINOPHEN 5; 325 MG/1; MG/1
1 TABLET ORAL EVERY 4 HOURS
Qty: 0 | Refills: 0 | Status: DISCONTINUED | OUTPATIENT
Start: 2019-02-28 | End: 2019-03-07

## 2019-02-28 RX ADMIN — Medication 20 MILLIGRAM(S): at 18:46

## 2019-02-28 RX ADMIN — HEPARIN SODIUM 5000 UNIT(S): 5000 INJECTION INTRAVENOUS; SUBCUTANEOUS at 14:11

## 2019-02-28 RX ADMIN — Medication 1 TABLET(S): at 14:10

## 2019-02-28 RX ADMIN — HEPARIN SODIUM 5000 UNIT(S): 5000 INJECTION INTRAVENOUS; SUBCUTANEOUS at 21:49

## 2019-02-28 RX ADMIN — Medication 500 MILLIGRAM(S): at 14:09

## 2019-02-28 RX ADMIN — HEPARIN SODIUM 5000 UNIT(S): 5000 INJECTION INTRAVENOUS; SUBCUTANEOUS at 05:50

## 2019-02-28 RX ADMIN — OXYCODONE AND ACETAMINOPHEN 1 TABLET(S): 5; 325 TABLET ORAL at 10:09

## 2019-02-28 RX ADMIN — Medication 1 TABLET(S): at 21:48

## 2019-02-28 RX ADMIN — Medication 650 MILLIGRAM(S): at 21:48

## 2019-02-28 RX ADMIN — Medication 1000 UNIT(S): at 14:08

## 2019-02-28 RX ADMIN — CYCLOSPORINE 200 MILLIGRAM(S): 100 CAPSULE ORAL at 21:50

## 2019-02-28 RX ADMIN — ESCITALOPRAM OXALATE 5 MILLIGRAM(S): 10 TABLET, FILM COATED ORAL at 14:09

## 2019-02-28 RX ADMIN — Medication 1 TABLET(S): at 14:08

## 2019-02-28 RX ADMIN — Medication 1 MILLIGRAM(S): at 14:08

## 2019-02-28 RX ADMIN — Medication 12.5 MILLIGRAM(S): at 18:34

## 2019-02-28 RX ADMIN — Medication 81 MILLIGRAM(S): at 14:20

## 2019-02-28 RX ADMIN — CYCLOSPORINE 200 MILLIGRAM(S): 100 CAPSULE ORAL at 09:44

## 2019-02-28 RX ADMIN — GABAPENTIN 300 MILLIGRAM(S): 400 CAPSULE ORAL at 05:50

## 2019-02-28 RX ADMIN — Medication 650 MILLIGRAM(S): at 14:28

## 2019-02-28 RX ADMIN — Medication 5 MILLIGRAM(S): at 21:48

## 2019-02-28 RX ADMIN — GABAPENTIN 300 MILLIGRAM(S): 400 CAPSULE ORAL at 18:34

## 2019-02-28 RX ADMIN — Medication 1 TABLET(S): at 05:49

## 2019-02-28 RX ADMIN — Medication 500000 UNIT(S): at 21:52

## 2019-02-28 RX ADMIN — Medication 250 MILLIGRAM(S): at 14:10

## 2019-02-28 RX ADMIN — OXYCODONE AND ACETAMINOPHEN 1 TABLET(S): 5; 325 TABLET ORAL at 09:39

## 2019-02-28 RX ADMIN — ATORVASTATIN CALCIUM 10 MILLIGRAM(S): 80 TABLET, FILM COATED ORAL at 21:48

## 2019-02-28 RX ADMIN — Medication 12.5 MILLIGRAM(S): at 05:50

## 2019-02-28 RX ADMIN — AMLODIPINE BESYLATE 10 MILLIGRAM(S): 2.5 TABLET ORAL at 05:49

## 2019-02-28 NOTE — PROGRESS NOTE ADULT - SUBJECTIVE AND OBJECTIVE BOX
Patient is a 78y old  Female who presents with a chief complaint of Cellulitis (28 Feb 2019 11:17)      SUBJECTIVE / OVERNIGHT EVENTS:  awake alert.  not eating much per the aide.  encouraged at bedside.  no cp, no sob.  pain is well controlled.       Vital Signs Last 24 Hrs  T(C): 37.7 (28 Feb 2019 08:39), Max: 37.7 (28 Feb 2019 08:39)  T(F): 99.8 (28 Feb 2019 08:39), Max: 99.8 (28 Feb 2019 08:39)  HR: 86 (28 Feb 2019 08:39) (78 - 93)  BP: 134/65 (28 Feb 2019 08:39) (105/68 - 134/65)  BP(mean): --  RR: 18 (28 Feb 2019 08:39) (18 - 20)  SpO2: 96% (28 Feb 2019 08:39) (95% - 96%)  I&O's Summary    27 Feb 2019 07:01  -  28 Feb 2019 07:00  --------------------------------------------------------  IN: 240 mL / OUT: 300 mL / NET: -60 mL    28 Feb 2019 07:01  -  28 Feb 2019 13:14  --------------------------------------------------------  IN: 100 mL / OUT: 0 mL / NET: 100 mL      PHYSICAL EXAM:  GENERAL: NAD, lying in bed, awake alert  HEAD:  Atraumatic, Normocephalic  EYES: EOMI, PERRLA, conjunctiva and sclera clear  NECK: Supple, No JVD  CHEST/LUNG: mild decrease breath sounds bilaterally; No wheeze   HEART: Regular rate and rhythm; No murmurs, rubs, or gallops  ABDOMEN: Soft, Nontender, Nondistended; Bowel sounds present  Neuro: AAO x 2, no focal deficit  EXTREMITIES:  2+ Peripheral Pulses, No clubbing, cyanosis.  2+ nonpitting edema LE  Back: sacral and thigh wound. +wound vac in place  SKIN: No rashes or lesions    LABS:                        8.5    19.7  )-----------( 306      ( 28 Feb 2019 10:17 )             26.3     02-28    138  |  105  |  52<H>  ----------------------------<  104<H>  5.4<H>   |  19<L>  |  1.82<H>    Ca    8.6      28 Feb 2019 10:14        CAPILLARY BLOOD GLUCOSE            Urinalysis Basic - ( 26 Feb 2019 18:27 )    Color: x / Appearance: x / SG: x / pH: x  Gluc: x / Ketone: x  / Bili: x / Urobili: x   Blood: x / Protein: x / Nitrite: x   Leuk Esterase: x / RBC: 1 /hpf / WBC 7 /HPF   Sq Epi: x / Non Sq Epi: 1 / Bacteria: Occasional        RADIOLOGY & ADDITIONAL TESTS:    Imaging Personally Reviewed:  [x] YES  [ ] NO    Consultant(s) Notes Reviewed:  [x] YES  [ ] NO      MEDICATIONS  (STANDING):  amLODIPine   Tablet 10 milliGRAM(s) Oral daily  ascorbic acid 500 milliGRAM(s) Oral daily  aspirin enteric coated 81 milliGRAM(s) Oral daily  atorvastatin 10 milliGRAM(s) Oral at bedtime  bisacodyl 5 milliGRAM(s) Oral at bedtime  calcium carbonate 1250 mG  + Vitamin D (OsCal 500 + D) 1 Tablet(s) Oral three times a day  cholecalciferol 1000 Unit(s) Oral daily  cycloSPORINE  (SandIMMUNE)  Solution 200 milliGRAM(s) Oral two times a day  escitalopram 5 milliGRAM(s) Oral daily  folic acid 1 milliGRAM(s) Oral daily  gabapentin 300 milliGRAM(s) Oral two times a day  heparin  Injectable 5000 Unit(s) SubCutaneous every 8 hours  melatonin 3 milliGRAM(s) Oral at bedtime  metoprolol tartrate 12.5 milliGRAM(s) Oral two times a day  polyethylene glycol 3350 17 Gram(s) Oral daily  vancomycin  IVPB 1000 milliGRAM(s) IV Intermittent every 48 hours  vitamin B complex with vitamin C 1 Tablet(s) Oral daily    MEDICATIONS  (PRN):  acetaminophen   Tablet .. 650 milliGRAM(s) Oral every 6 hours PRN Mild Pain (1 - 3)  oxyCODONE    5 mG/acetaminophen 325 mG 1 Tablet(s) Oral every 4 hours PRN Severe Pain (7 - 10)      Care Discussed with Consultants/Other Providers [x] YES  [ ] NO    HEALTH ISSUES - PROBLEM Dx:  Hyperkalemia: Hyperkalemia  Acute kidney injury superimposed on CKD: Acute kidney injury superimposed on CKD  Acute kidney failure: Acute kidney failure  Pyoderma gangrenosum: Pyoderma gangrenosum  Depression, unspecified depression type: Depression, unspecified depression type  Medication management: Medication management  Gastroesophageal reflux disease, esophagitis presence not specified: Gastroesophageal reflux disease, esophagitis presence not specified  Aortic stenosis, severe: Aortic stenosis, severe  Coronary artery disease involving native coronary artery of native heart without angina pectoris: Coronary artery disease involving native coronary artery of native heart without angina pectoris  Altered mental status, unspecified altered mental status type: Altered mental status, unspecified altered mental status type  Cellulitis of leg, right: Cellulitis of leg, right

## 2019-02-28 NOTE — PROGRESS NOTE ADULT - PROBLEM SELECTOR PLAN 1
Reviewed images and labs  Noted low FeNa, no RBCs in urine, B/L Pleural effusions, Low Alb,  Unlikely to have ATN or GN with above results  Pending repeat C3/C4  Mildly elevated pro-BNP  Please repeat Cyclosporine level to be drawn 1hrs prior to AM dose, level is high but drawn in evening, hard to evaluate  Stop IVF  Start lasix 20mg IV BID and will reeval daily  Avoid nephrotoxins  Renal diet  Daily BMP

## 2019-02-28 NOTE — CHART NOTE - NSCHARTNOTEFT_GEN_A_CORE
· Note Type	Event Note      Called by Dr Muchado (renal) and recommended to   1. change diet to pureed-c/o dysphagia  2. Add Lasix 20 mg IV twice daily  Seen and examined the patient. Aide was at bedside  As per the aid patient is not eating much  On exam (+) thrush back of the throat  DW Dr Palumbo, --started nystatin swish and swallow                    --Speech and swallow ordered                    --diet changed to pureed  Nancy Cash NP  [phone].

## 2019-02-28 NOTE — PROGRESS NOTE ADULT - SUBJECTIVE AND OBJECTIVE BOX
[facility] [place] [facility] - KIRTI Castillo / KIRTI Gracia / RITA Daniels/ KIRTI Zamudio/ KIRTI Irizarry/ LOLLY Hays / CLINT Forbes / LEBRON Boss  ---------------------------------------------------------------------------------------------------------------  seen and examined today for YONATAN   Interval : Poor PO intake, worsening serum creatinine   VITALS:  T(F): 99.8 (02-28-19 @ 08:39), Max: 99.8 (02-28-19 @ 08:39)  HR: 86 (02-28-19 @ 08:39)  BP: 134/65 (02-28-19 @ 08:39)  RR: 18 (02-28-19 @ 08:39)  SpO2: 96% (02-28-19 @ 08:39)  Wt(kg): --    02-27 @ 07:01  -  02-28 @ 07:00  --------------------------------------------------------  IN: 240 mL / OUT: 300 mL / NET: -60 mL    02-28 @ 07:01  -  02-28 @ 13:50  --------------------------------------------------------  IN: 100 mL / OUT: 0 mL / NET: 100 mL      Physical Exam :-  Constitutional: Morbidly obese  Neck: Supple.  Respiratory: Bilateral equal breath sounds,  Cardiovascular: S1, S2 normal,  Gastrointestinal: Bowel Sounds present, soft, non tender.  Extremities: Anasarca 2+  Neurological: Alert and Oriented x 3, no focal deficits  Psychiatric: Normal mood, normal affect  Data:-  Allergies :   amoxicillin (Other)  IV Contrast (Flushing (Skin); Nausea)    Hospital Medications:   MEDICATIONS  (STANDING):  amLODIPine   Tablet 10 milliGRAM(s) Oral daily  ascorbic acid 500 milliGRAM(s) Oral daily  aspirin enteric coated 81 milliGRAM(s) Oral daily  atorvastatin 10 milliGRAM(s) Oral at bedtime  bisacodyl 5 milliGRAM(s) Oral at bedtime  calcium carbonate 1250 mG  + Vitamin D (OsCal 500 + D) 1 Tablet(s) Oral three times a day  cholecalciferol 1000 Unit(s) Oral daily  cycloSPORINE  (SandIMMUNE)  Solution 200 milliGRAM(s) Oral two times a day  escitalopram 5 milliGRAM(s) Oral daily  folic acid 1 milliGRAM(s) Oral daily  gabapentin 300 milliGRAM(s) Oral two times a day  heparin  Injectable 5000 Unit(s) SubCutaneous every 8 hours  melatonin 3 milliGRAM(s) Oral at bedtime  metoprolol tartrate 12.5 milliGRAM(s) Oral two times a day  polyethylene glycol 3350 17 Gram(s) Oral daily  vancomycin  IVPB 1000 milliGRAM(s) IV Intermittent every 48 hours  vitamin B complex with vitamin C 1 Tablet(s) Oral daily    02-28    138  |  105  |  52<H>  ----------------------------<  104<H>  5.4<H>   |  19<L>  |  1.82<H>    Ca    8.6      28 Feb 2019 10:14      Creatinine Trend: 1.82 <--, 1.71 <--, 1.81 <--, 1.70 <--, 1.61 <--, 1.65 <--, 1.43 <--                        8.5    19.7  )-----------( 306      ( 28 Feb 2019 10:17 )             26.3

## 2019-02-28 NOTE — PROGRESS NOTE ADULT - PROBLEM SELECTOR PLAN 1
c/w vancomycin, q48hrs, renal dose. monitor vanc trough.  initially on Meropenem but later discontinued after culture data.  blood cultures from 2/14 are negative.  ID f/u appreciated  cyclosporine temporarily held on admission, but later restarted. d/w ID, okay to restart. Previously seen by Dermatology. reconsulted but states no urgent issues so not on board. To c/w steroid sparing agent cyclosporine. levels sent, in therapeutic range.   completed chronic steroids taper for pyoderma.   (she was on Prednisone 5 mg qdaily)  Per orthopedic surgery can be OOB primarily to wheelchair and can 50% weight bear on R leg if straight in immobilizer.  wound care consult and wound vac management appreciated.  LE edema likely due to IVF. will d/c IVF. Cr stable. PRN IVF if needed  LE dopplers to r/o DVT  TTE without discrete vegetation  ERIKA pending but likely will not change fact that she will need PICC for 4-6 week of antibx. will not do ERIKA give risk.   Given temp last night, will hold off PICC; if persistent fevers, may need to change to daptomycin as per ID  monitor blood cultures. repeat sent. clinicall better.

## 2019-02-28 NOTE — PROGRESS NOTE ADULT - ASSESSMENT
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  F/u blood cultures 2/14 are negative  New L LE DVT noted (IVC filter already in place)  leukocytosis, low grade fevers  Suggest:  Continue Vanco 1 g q 48, f/u level  anticipate 4-6 wk course via PICC  echo results reviewed, ERIKA would be ideal, but will not , since she is a poor candidate for valve intervention or device explant   Follow temps and CBC/diff   Local wound care  if fever persists or breakthrough bacteremia, may need to switch to Daptomycin  overall prognosis seems poor

## 2019-02-28 NOTE — PROGRESS NOTE ADULT - SUBJECTIVE AND OBJECTIVE BOX
CC: f/u for coag negative staph bacteremia    Pt is resting in bed, mostly bedbound, poor PO intake as per aide  no fevers  REVIEW OF SYSTEMS: as above, limited  All other review of systems negative (Comprehensive ROS)    Antimicrobials Day # 15  vancomycin  IVPB 1000 milliGRAM(s) IV Intermittent every 48 hours    Medications Reviewed    Vital Signs Last 24 Hrs  T(C): 37.7 (28 Feb 2019 08:39), Max: 37.7 (28 Feb 2019 08:39)  T(F): 99.8 (28 Feb 2019 08:39), Max: 99.8 (28 Feb 2019 08:39)  HR: 86 (28 Feb 2019 08:39) (78 - 93)  BP: 134/65 (28 Feb 2019 08:39) (105/68 - 134/65)  BP(mean): --  RR: 18 (28 Feb 2019 08:39) (18 - 20)  SpO2: 96% (28 Feb 2019 08:39) (95% - 96%)    PHYSICAL EXAM:  General: no acute distress  Eyes:  anicteric, no conjunctival injection, no discharge  Oropharynx: no lesions or injection 	  Neck: supple, without adenopathy  Lungs: clear to auscultation  Heart: regular rate and rhythm; no murmur, rubs or gallops  Abdomen: soft, nondistended, nontender, without mass or organomegaly  Skin: L flank wounds dressed; R hip VAC, R thigh and knee/leg wounds healed  Extremities: mild edema  Neurologic: alert, generalized weakness    LAB RESULTS:                                              8.5    19.7  )-----------( 306      ( 28 Feb 2019 10:17 )             26.3   02-28    138  |  105  |  52<H>  ----------------------------<  104<H>  5.4<H>   |  19<L>  |  1.82<H>    Ca    8.6      28 Feb 2019 10:14              MICROBIOLOGY:  RECENT CULTURES reviewed    RADIOLOGY REVIEWED

## 2019-03-01 LAB
ANION GAP SERPL CALC-SCNC: 13 MMOL/L — SIGNIFICANT CHANGE UP (ref 5–17)
BASOPHILS # BLD AUTO: 0 K/UL — SIGNIFICANT CHANGE UP (ref 0–0.2)
BASOPHILS NFR BLD AUTO: 0.1 % — SIGNIFICANT CHANGE UP (ref 0–2)
BUN SERPL-MCNC: 51 MG/DL — HIGH (ref 7–23)
CALCIUM SERPL-MCNC: 8.5 MG/DL — SIGNIFICANT CHANGE UP (ref 8.4–10.5)
CHLORIDE SERPL-SCNC: 108 MMOL/L — SIGNIFICANT CHANGE UP (ref 96–108)
CO2 SERPL-SCNC: 21 MMOL/L — LOW (ref 22–31)
CREAT SERPL-MCNC: 1.77 MG/DL — HIGH (ref 0.5–1.3)
EOSINOPHIL # BLD AUTO: 0.2 K/UL — SIGNIFICANT CHANGE UP (ref 0–0.5)
EOSINOPHIL NFR BLD AUTO: 1.1 % — SIGNIFICANT CHANGE UP (ref 0–6)
GLUCOSE SERPL-MCNC: 134 MG/DL — HIGH (ref 70–99)
HCT VFR BLD CALC: 22.7 % — LOW (ref 34.5–45)
HGB BLD-MCNC: 7.5 G/DL — LOW (ref 11.5–15.5)
LYMPHOCYTES # BLD AUTO: 11.1 % — LOW (ref 13–44)
LYMPHOCYTES # BLD AUTO: 2 K/UL — SIGNIFICANT CHANGE UP (ref 1–3.3)
MCHC RBC-ENTMCNC: 27.7 PG — SIGNIFICANT CHANGE UP (ref 27–34)
MCHC RBC-ENTMCNC: 33 GM/DL — SIGNIFICANT CHANGE UP (ref 32–36)
MCV RBC AUTO: 84.2 FL — SIGNIFICANT CHANGE UP (ref 80–100)
MONOCYTES # BLD AUTO: 0.9 K/UL — SIGNIFICANT CHANGE UP (ref 0–0.9)
MONOCYTES NFR BLD AUTO: 4.9 % — SIGNIFICANT CHANGE UP (ref 2–14)
NEUTROPHILS # BLD AUTO: 14.6 K/UL — HIGH (ref 1.8–7.4)
NEUTROPHILS NFR BLD AUTO: 82.8 % — HIGH (ref 43–77)
PLATELET # BLD AUTO: 259 K/UL — SIGNIFICANT CHANGE UP (ref 150–400)
POTASSIUM SERPL-MCNC: 4.9 MMOL/L — SIGNIFICANT CHANGE UP (ref 3.5–5.3)
POTASSIUM SERPL-SCNC: 4.9 MMOL/L — SIGNIFICANT CHANGE UP (ref 3.5–5.3)
RBC # BLD: 2.69 M/UL — LOW (ref 3.8–5.2)
RBC # FLD: 16.6 % — HIGH (ref 10.3–14.5)
SODIUM SERPL-SCNC: 142 MMOL/L — SIGNIFICANT CHANGE UP (ref 135–145)
WBC # BLD: 17.6 K/UL — HIGH (ref 3.8–10.5)
WBC # FLD AUTO: 17.6 K/UL — HIGH (ref 3.8–10.5)

## 2019-03-01 RX ADMIN — OXYCODONE AND ACETAMINOPHEN 1 TABLET(S): 5; 325 TABLET ORAL at 08:02

## 2019-03-01 RX ADMIN — Medication 500000 UNIT(S): at 21:48

## 2019-03-01 RX ADMIN — HEPARIN SODIUM 5000 UNIT(S): 5000 INJECTION INTRAVENOUS; SUBCUTANEOUS at 05:42

## 2019-03-01 RX ADMIN — OXYCODONE AND ACETAMINOPHEN 1 TABLET(S): 5; 325 TABLET ORAL at 08:00

## 2019-03-01 RX ADMIN — Medication 1 TABLET(S): at 12:09

## 2019-03-01 RX ADMIN — Medication 1 MILLIGRAM(S): at 12:09

## 2019-03-01 RX ADMIN — Medication 1 TABLET(S): at 05:41

## 2019-03-01 RX ADMIN — Medication 20 MILLIGRAM(S): at 05:42

## 2019-03-01 RX ADMIN — Medication 12.5 MILLIGRAM(S): at 05:41

## 2019-03-01 RX ADMIN — Medication 81 MILLIGRAM(S): at 12:09

## 2019-03-01 RX ADMIN — Medication 20 MILLIGRAM(S): at 17:49

## 2019-03-01 RX ADMIN — Medication 650 MILLIGRAM(S): at 18:20

## 2019-03-01 RX ADMIN — Medication 1000 UNIT(S): at 12:12

## 2019-03-01 RX ADMIN — CYCLOSPORINE 200 MILLIGRAM(S): 100 CAPSULE ORAL at 21:43

## 2019-03-01 RX ADMIN — GABAPENTIN 300 MILLIGRAM(S): 400 CAPSULE ORAL at 05:41

## 2019-03-01 RX ADMIN — HEPARIN SODIUM 5000 UNIT(S): 5000 INJECTION INTRAVENOUS; SUBCUTANEOUS at 21:49

## 2019-03-01 RX ADMIN — Medication 500000 UNIT(S): at 17:50

## 2019-03-01 RX ADMIN — POLYETHYLENE GLYCOL 3350 17 GRAM(S): 17 POWDER, FOR SOLUTION ORAL at 12:09

## 2019-03-01 RX ADMIN — Medication 650 MILLIGRAM(S): at 17:50

## 2019-03-01 RX ADMIN — Medication 500000 UNIT(S): at 05:42

## 2019-03-01 RX ADMIN — Medication 650 MILLIGRAM(S): at 21:48

## 2019-03-01 RX ADMIN — CYCLOSPORINE 200 MILLIGRAM(S): 100 CAPSULE ORAL at 11:29

## 2019-03-01 RX ADMIN — Medication 1 TABLET(S): at 17:45

## 2019-03-01 RX ADMIN — Medication 650 MILLIGRAM(S): at 05:41

## 2019-03-01 RX ADMIN — ESCITALOPRAM OXALATE 5 MILLIGRAM(S): 10 TABLET, FILM COATED ORAL at 12:08

## 2019-03-01 RX ADMIN — HEPARIN SODIUM 5000 UNIT(S): 5000 INJECTION INTRAVENOUS; SUBCUTANEOUS at 17:45

## 2019-03-01 RX ADMIN — Medication 1 TABLET(S): at 21:48

## 2019-03-01 RX ADMIN — AMLODIPINE BESYLATE 10 MILLIGRAM(S): 2.5 TABLET ORAL at 05:41

## 2019-03-01 RX ADMIN — GABAPENTIN 300 MILLIGRAM(S): 400 CAPSULE ORAL at 17:49

## 2019-03-01 RX ADMIN — Medication 500 MILLIGRAM(S): at 12:09

## 2019-03-01 RX ADMIN — ATORVASTATIN CALCIUM 10 MILLIGRAM(S): 80 TABLET, FILM COATED ORAL at 21:49

## 2019-03-01 NOTE — DISCHARGE NOTE ADULT - CARE PLAN
Principal Discharge DX:	Cellulitis of leg, right  Secondary Diagnosis:	Bacteremia  Secondary Diagnosis:	Acute kidney injury superimposed on CKD

## 2019-03-01 NOTE — PROGRESS NOTE ADULT - SUBJECTIVE AND OBJECTIVE BOX
Patient is a 78y old  Female who presents with a chief complaint of Cellulitis (01 Mar 2019 18:04)      SUBJECTIVE / OVERNIGHT EVENTS:  edema improving  the aide at bedside  pt doesn't want to talk.  ate this am, per the aide. only a little bit.       Vital Signs Last 24 Hrs  T(C): 37.2 (01 Mar 2019 17:04), Max: 37.4 (01 Mar 2019 08:52)  T(F): 98.9 (01 Mar 2019 17:04), Max: 99.4 (01 Mar 2019 08:52)  HR: 92 (01 Mar 2019 17:04) (89 - 93)  BP: 108/65 (01 Mar 2019 17:04) (108/65 - 149/77)  BP(mean): --  RR: 18 (01 Mar 2019 17:04) (18 - 20)  SpO2: 93% (01 Mar 2019 17:04) (92% - 95%)  I&O's Summary    28 Feb 2019 07:01  -  01 Mar 2019 07:00  --------------------------------------------------------  IN: 460 mL / OUT: 600 mL / NET: -140 mL    01 Mar 2019 07:01  -  01 Mar 2019 21:26  --------------------------------------------------------  IN: 700 mL / OUT: 0 mL / NET: 700 mL      PHYSICAL EXAM:  GENERAL: NAD, lying in bed, awake alert, morbidly obese  HEAD:  Atraumatic, Normocephalic  EYES: EOMI, PERRLA, conjunctiva and sclera clear  NECK: Supple, No JVD  CHEST/LUNG: mild decrease breath sounds bilaterally; No wheeze   HEART: Regular rate and rhythm; No murmurs, rubs, or gallops  ABDOMEN: Soft, Nontender, Nondistended; Bowel sounds present  Neuro: AAO x 2, no focal deficit  EXTREMITIES:  2+ Peripheral Pulses, No clubbing, cyanosis.  2+ nonpitting edema LE  Back: sacral and thigh wound. +wound vac in place  SKIN: No rashes or lesions    LABS:                        7.5    17.6  )-----------( 259      ( 01 Mar 2019 10:02 )             22.7     03-01    142  |  108  |  51<H>  ----------------------------<  134<H>  4.9   |  21<L>  |  1.77<H>    Ca    8.5      01 Mar 2019 10:01        CAPILLARY BLOOD GLUCOSE                RADIOLOGY & ADDITIONAL TESTS:    Imaging Personally Reviewed:  [x] YES  [ ] NO    Consultant(s) Notes Reviewed:  [x] YES  [ ] NO      MEDICATIONS  (STANDING):  amLODIPine   Tablet 10 milliGRAM(s) Oral daily  ascorbic acid 500 milliGRAM(s) Oral daily  aspirin enteric coated 81 milliGRAM(s) Oral daily  atorvastatin 10 milliGRAM(s) Oral at bedtime  bisacodyl 5 milliGRAM(s) Oral at bedtime  calcium carbonate 1250 mG  + Vitamin D (OsCal 500 + D) 1 Tablet(s) Oral three times a day  cholecalciferol 1000 Unit(s) Oral daily  cycloSPORINE  (SandIMMUNE)  Solution 200 milliGRAM(s) Oral two times a day  escitalopram 5 milliGRAM(s) Oral daily  folic acid 1 milliGRAM(s) Oral daily  furosemide   Injectable 20 milliGRAM(s) IV Push two times a day  gabapentin 300 milliGRAM(s) Oral two times a day  heparin  Injectable 5000 Unit(s) SubCutaneous every 8 hours  melatonin 3 milliGRAM(s) Oral at bedtime  metoprolol tartrate 12.5 milliGRAM(s) Oral two times a day  nystatin    Suspension 245586 Unit(s) Swish and Swallow three times a day  polyethylene glycol 3350 17 Gram(s) Oral daily  sodium bicarbonate 650 milliGRAM(s) Oral three times a day  vancomycin  IVPB 1000 milliGRAM(s) IV Intermittent every 48 hours  vitamin B complex with vitamin C 1 Tablet(s) Oral daily    MEDICATIONS  (PRN):  acetaminophen   Tablet .. 650 milliGRAM(s) Oral every 6 hours PRN Mild Pain (1 - 3)  oxyCODONE    5 mG/acetaminophen 325 mG 1 Tablet(s) Oral every 4 hours PRN Severe Pain (7 - 10)      Care Discussed with Consultants/Other Providers [x] YES  [ ] NO    HEALTH ISSUES - PROBLEM Dx:  Hyperkalemia: Hyperkalemia  Acute kidney injury superimposed on CKD: Acute kidney injury superimposed on CKD  Acute kidney failure: Acute kidney failure  Pyoderma gangrenosum: Pyoderma gangrenosum  Depression, unspecified depression type: Depression, unspecified depression type  Medication management: Medication management  Gastroesophageal reflux disease, esophagitis presence not specified: Gastroesophageal reflux disease, esophagitis presence not specified  Aortic stenosis, severe: Aortic stenosis, severe  Coronary artery disease involving native coronary artery of native heart without angina pectoris: Coronary artery disease involving native coronary artery of native heart without angina pectoris  Altered mental status, unspecified altered mental status type: Altered mental status, unspecified altered mental status type  Cellulitis of leg, right: Cellulitis of leg, right

## 2019-03-01 NOTE — PRE-OP CHECKLIST - SITE MARKED BY SURGEON
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 Per EMS drank half litre of vodka today, drinks daily, was endorsing SI so EMS was called. Pt. currently asleep/snoring in stretcher, but arousable. yes 27yo Male unknown pmhx BIBEMS from [facility] for SI and etoh intox. Per EMS drank half litre of vodka today, drinks daily, was endorsing SI so EMS was called. Pt. currently asleep/snoring in stretcher, but arousable.  [place]fish: 28M per triage note "pt brought in for intoxication and SI at local [facility]- pt currently denies any SI/HI, VH/AH, admits to drinking 0.5 pint of vodka, admits to daily drinking- pt calm ,cooperative in triage- denies all medical complaints at this time- called  to be seen in main- undressed in ." Pt currently sleeping, unable to provide any additional info.

## 2019-03-01 NOTE — PROGRESS NOTE ADULT - ASSESSMENT
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  F/u blood cultures 2/14 are negative  New L LE DVT noted (IVC filter already in place)  leukocytosis, low grade fevers  Suggest:  Continue Vanco 1 g q 48, f/u level  anticipate 4-6 wk course via PICC  Follow temps and CBC/diff   Local wound care  if fever or leukocytosis or breakthrough bacteremia, may need to switch to Daptomycin  overall prognosis seems poor

## 2019-03-01 NOTE — PROGRESS NOTE ADULT - PROBLEM SELECTOR PLAN 1
Pedning cyclo repeat  BUN/Creat stable  Continue lasix 20mg IV BID for now  Avoid nephrotoxins  Renal diet  Daily BMP

## 2019-03-01 NOTE — PROGRESS NOTE ADULT - PROBLEM SELECTOR PLAN 1
c/w vancomycin, q48hrs, renal dose. monitor vanc trough.  (initially on Meropenem but later discontinued after culture data: recurrrent strep epidermidis)  blood cultures from 2/14 are negative.  ID f/u appreciated  cyclosporine temporarily held on admission, but later restarted. d/w ID, okay to restart. Previously seen by Dermatology. reconsulted but states no urgent issues so not on board. To c/w steroid sparing agent cyclosporine. levels sent, in therapeutic range.   completed chronic steroids taper for pyoderma.   (she was on Prednisone 5 mg qdaily)  Per orthopedic surgery can be OOB primarily to wheelchair and can 50% weight bear on R leg if straight in immobilizer.  wound care consult and wound vac management appreciated.  LE edema likely due to IVF. will d/c IVF. Cr stable. Lasix started  LE dopplers to r/o DVT  TTE without discrete vegetation  ERIKA pending but likely will not change fact that she will need PICC for 4-6 week of antibx. will not do ERIKA give risk.   Plan for PICC line

## 2019-03-01 NOTE — PROGRESS NOTE ADULT - SUBJECTIVE AND OBJECTIVE BOX
[facility] - KIRTI Castillo / KIRTI Gracia / RITA Daniels/ KIRTI Zamudio/ KIRTI Irizarry/ LOLLY Hays / CLINT Forbes / LEBRON Boss  ---------------------------------------------------------------------------------------------------------------  seen and examined today for Mable on CKD  Interval : swelling improving  VITALS:  T(F): 99.4 (03-01-19 @ 08:52), Max: 99.4 (03-01-19 @ 08:52)  HR: 89 (03-01-19 @ 08:52)  BP: 149/77 (03-01-19 @ 08:52)  RR: 20 (03-01-19 @ 08:52)  SpO2: 94% (03-01-19 @ 08:52)  Wt(kg): --    02-28 @ 07:01  -  03-01 @ 07:00  --------------------------------------------------------  IN: 460 mL / OUT: 600 mL / NET: -140 mL    03-01 @ 07:01  -  03-01 @ 11:40  --------------------------------------------------------  IN: 400 mL / OUT: 0 mL / NET: 400 mL      Physical Exam :-  Constitutional: NAD  Neck: Supple.  Respiratory: Bilateral equal breath sounds,  Cardiovascular: S1, S2 normal,  Gastrointestinal: Bowel Sounds present, soft, non tender.  Extremities: Anasarca 2+  Neurological: Alert and Oriented x 3, no focal deficits  Psychiatric: Normal mood, normal affect  Data:-  Allergies :   amoxicillin (Other)  IV Contrast (Flushing (Skin); Nausea)    Hospital Medications:   MEDICATIONS  (STANDING):  amLODIPine   Tablet 10 milliGRAM(s) Oral daily  ascorbic acid 500 milliGRAM(s) Oral daily  aspirin enteric coated 81 milliGRAM(s) Oral daily  atorvastatin 10 milliGRAM(s) Oral at bedtime  bisacodyl 5 milliGRAM(s) Oral at bedtime  calcium carbonate 1250 mG  + Vitamin D (OsCal 500 + D) 1 Tablet(s) Oral three times a day  cholecalciferol 1000 Unit(s) Oral daily  cycloSPORINE  (SandIMMUNE)  Solution 200 milliGRAM(s) Oral two times a day  escitalopram 5 milliGRAM(s) Oral daily  folic acid 1 milliGRAM(s) Oral daily  furosemide   Injectable 20 milliGRAM(s) IV Push two times a day  gabapentin 300 milliGRAM(s) Oral two times a day  heparin  Injectable 5000 Unit(s) SubCutaneous every 8 hours  melatonin 3 milliGRAM(s) Oral at bedtime  metoprolol tartrate 12.5 milliGRAM(s) Oral two times a day  nystatin    Suspension 795997 Unit(s) Swish and Swallow three times a day  polyethylene glycol 3350 17 Gram(s) Oral daily  sodium bicarbonate 650 milliGRAM(s) Oral three times a day  vancomycin  IVPB 1000 milliGRAM(s) IV Intermittent every 48 hours  vitamin B complex with vitamin C 1 Tablet(s) Oral daily    03-01    142  |  108  |  51<H>  ----------------------------<  134<H>  4.9   |  21<L>  |  1.77<H>    Ca    8.5      01 Mar 2019 10:01      Creatinine Trend: 1.77 <--, 1.82 <--, 1.71 <--, 1.81 <--, 1.70 <--, 1.61 <--, 1.65 <--                        7.5    17.6  )-----------( 259      ( 01 Mar 2019 10:02 )             22.7

## 2019-03-01 NOTE — PROGRESS NOTE ADULT - SUBJECTIVE AND OBJECTIVE BOX
CC: f/u for coag negative staph bacteremia    Pt is resting in bed, mostly bedbound, poor PO intake as per aide  no fevers  REVIEW OF SYSTEMS: as above, limited  All other review of systems negative (Comprehensive ROS)    Antimicrobials Day # 16  vancomycin  IVPB 1000 milliGRAM(s) IV Intermittent every 48 hours    Medications Reviewed    Vital Signs Last 24 Hrs  T(C): 37.4 (01 Mar 2019 08:52), Max: 37.4 (01 Mar 2019 08:52)  T(F): 99.4 (01 Mar 2019 08:52), Max: 99.4 (01 Mar 2019 08:52)  HR: 89 (01 Mar 2019 08:52) (87 - 93)  BP: 149/77 (01 Mar 2019 08:52) (100/64 - 149/77)  BP(mean): --  RR: 20 (01 Mar 2019 08:52) (18 - 24)  SpO2: 94% (01 Mar 2019 08:52) (92% - 95%)    PHYSICAL EXAM:  General: no acute distress  Eyes:  anicteric, no conjunctival injection, no discharge  Oropharynx: no lesions or injection 	  Neck: supple, without adenopathy  Lungs: clear to auscultation  Heart: regular rate and rhythm; no murmur, rubs or gallops  Abdomen: soft, nondistended, nontender, without mass or organomegaly  Skin: L flank wounds dressed; R hip VAC, R thigh and knee/leg wounds healed  Extremities: mild edema  Neurologic: alert, generalized weakness    LAB RESULTS:                                              8.5    19.7  )-----------( 306      ( 28 Feb 2019 10:17 )             26.3   02-28    138  |  105  |  52<H>  ----------------------------<  104<H>  5.4<H>   |  19<L>  |  1.82<H>    Ca    8.6      28 Feb 2019 10:14                  MICROBIOLOGY:  RECENT CULTURES reviewed    RADIOLOGY REVIEWED

## 2019-03-01 NOTE — DISCHARGE NOTE ADULT - PATIENT PORTAL LINK FT
You can access the [facility] Patient Portal, offered by [facility], by registering with the following website: [email or web address]

## 2019-03-01 NOTE — DISCHARGE NOTE ADULT - HOSPITAL COURSE
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  F/u blood cultures 2/14 are negative  New L LE DVT noted (IVC filter already in place)  leukocytosis, low grade fevers

## 2019-03-02 LAB
ANION GAP SERPL CALC-SCNC: 13 MMOL/L — SIGNIFICANT CHANGE UP (ref 5–17)
BASOPHILS # BLD AUTO: 0.03 K/UL — SIGNIFICANT CHANGE UP (ref 0–0.2)
BASOPHILS NFR BLD AUTO: 0.2 % — SIGNIFICANT CHANGE UP (ref 0–2)
BUN SERPL-MCNC: 48 MG/DL — HIGH (ref 7–23)
CALCIUM SERPL-MCNC: 8.2 MG/DL — LOW (ref 8.4–10.5)
CHLORIDE SERPL-SCNC: 109 MMOL/L — HIGH (ref 96–108)
CO2 SERPL-SCNC: 22 MMOL/L — SIGNIFICANT CHANGE UP (ref 22–31)
CREAT SERPL-MCNC: 1.65 MG/DL — HIGH (ref 0.5–1.3)
CYCLOSPORINE SER-MCNC: 451 NG/ML — HIGH (ref 150–400)
CYCLOSPORINE SER-MCNC: 494 NG/ML — HIGH (ref 150–400)
EOSINOPHIL # BLD AUTO: 0.15 K/UL — SIGNIFICANT CHANGE UP (ref 0–0.5)
EOSINOPHIL NFR BLD AUTO: 0.9 % — SIGNIFICANT CHANGE UP (ref 0–6)
GLUCOSE SERPL-MCNC: 149 MG/DL — HIGH (ref 70–99)
HCT VFR BLD CALC: 25.4 % — LOW (ref 34.5–45)
HGB BLD-MCNC: 7.2 G/DL — LOW (ref 11.5–15.5)
IMM GRANULOCYTES NFR BLD AUTO: 1.2 % — SIGNIFICANT CHANGE UP (ref 0–1.5)
LYMPHOCYTES # BLD AUTO: 12.3 % — LOW (ref 13–44)
LYMPHOCYTES # BLD AUTO: 2.09 K/UL — SIGNIFICANT CHANGE UP (ref 1–3.3)
MCHC RBC-ENTMCNC: 26 PG — LOW (ref 27–34)
MCHC RBC-ENTMCNC: 28.3 GM/DL — LOW (ref 32–36)
MCV RBC AUTO: 91.7 FL — SIGNIFICANT CHANGE UP (ref 80–100)
MONOCYTES # BLD AUTO: 0.81 K/UL — SIGNIFICANT CHANGE UP (ref 0–0.9)
MONOCYTES NFR BLD AUTO: 4.8 % — SIGNIFICANT CHANGE UP (ref 2–14)
NEUTROPHILS # BLD AUTO: 13.74 K/UL — HIGH (ref 1.8–7.4)
NEUTROPHILS NFR BLD AUTO: 80.6 % — HIGH (ref 43–77)
PLATELET # BLD AUTO: 288 K/UL — SIGNIFICANT CHANGE UP (ref 150–400)
POTASSIUM SERPL-MCNC: 4.9 MMOL/L — SIGNIFICANT CHANGE UP (ref 3.5–5.3)
POTASSIUM SERPL-SCNC: 4.9 MMOL/L — SIGNIFICANT CHANGE UP (ref 3.5–5.3)
RBC # BLD: 2.77 M/UL — LOW (ref 3.8–5.2)
RBC # FLD: 17.2 % — HIGH (ref 10.3–14.5)
SODIUM SERPL-SCNC: 144 MMOL/L — SIGNIFICANT CHANGE UP (ref 135–145)
VANCOMYCIN TROUGH SERPL-MCNC: 14.2 UG/ML — SIGNIFICANT CHANGE UP (ref 10–20)
WBC # BLD: 17.02 K/UL — HIGH (ref 3.8–10.5)
WBC # FLD AUTO: 17.02 K/UL — HIGH (ref 3.8–10.5)

## 2019-03-02 RX ORDER — CYCLOSPORINE 100 MG/1
100 CAPSULE ORAL
Qty: 0 | Refills: 0 | Status: DISCONTINUED | OUTPATIENT
Start: 2019-03-02 | End: 2019-03-08

## 2019-03-02 RX ADMIN — Medication 650 MILLIGRAM(S): at 15:07

## 2019-03-02 RX ADMIN — HEPARIN SODIUM 5000 UNIT(S): 5000 INJECTION INTRAVENOUS; SUBCUTANEOUS at 05:54

## 2019-03-02 RX ADMIN — OXYCODONE AND ACETAMINOPHEN 1 TABLET(S): 5; 325 TABLET ORAL at 18:36

## 2019-03-02 RX ADMIN — Medication 250 MILLIGRAM(S): at 13:34

## 2019-03-02 RX ADMIN — Medication 1 TABLET(S): at 21:41

## 2019-03-02 RX ADMIN — Medication 5 MILLIGRAM(S): at 21:42

## 2019-03-02 RX ADMIN — Medication 500000 UNIT(S): at 21:42

## 2019-03-02 RX ADMIN — Medication 500000 UNIT(S): at 05:54

## 2019-03-02 RX ADMIN — Medication 650 MILLIGRAM(S): at 14:37

## 2019-03-02 RX ADMIN — CYCLOSPORINE 100 MILLIGRAM(S): 100 CAPSULE ORAL at 21:56

## 2019-03-02 RX ADMIN — HEPARIN SODIUM 5000 UNIT(S): 5000 INJECTION INTRAVENOUS; SUBCUTANEOUS at 21:42

## 2019-03-02 RX ADMIN — Medication 20 MILLIGRAM(S): at 06:08

## 2019-03-02 RX ADMIN — CYCLOSPORINE 200 MILLIGRAM(S): 100 CAPSULE ORAL at 08:23

## 2019-03-02 RX ADMIN — Medication 500000 UNIT(S): at 13:38

## 2019-03-02 RX ADMIN — Medication 12.5 MILLIGRAM(S): at 17:48

## 2019-03-02 RX ADMIN — Medication 500 MILLIGRAM(S): at 12:22

## 2019-03-02 RX ADMIN — POLYETHYLENE GLYCOL 3350 17 GRAM(S): 17 POWDER, FOR SOLUTION ORAL at 12:22

## 2019-03-02 RX ADMIN — Medication 650 MILLIGRAM(S): at 05:53

## 2019-03-02 RX ADMIN — Medication 20 MILLIGRAM(S): at 18:03

## 2019-03-02 RX ADMIN — Medication 1 MILLIGRAM(S): at 12:22

## 2019-03-02 RX ADMIN — HEPARIN SODIUM 5000 UNIT(S): 5000 INJECTION INTRAVENOUS; SUBCUTANEOUS at 13:38

## 2019-03-02 RX ADMIN — GABAPENTIN 300 MILLIGRAM(S): 400 CAPSULE ORAL at 05:54

## 2019-03-02 RX ADMIN — ATORVASTATIN CALCIUM 10 MILLIGRAM(S): 80 TABLET, FILM COATED ORAL at 21:41

## 2019-03-02 RX ADMIN — GABAPENTIN 300 MILLIGRAM(S): 400 CAPSULE ORAL at 17:48

## 2019-03-02 RX ADMIN — Medication 1 TABLET(S): at 05:54

## 2019-03-02 RX ADMIN — ESCITALOPRAM OXALATE 5 MILLIGRAM(S): 10 TABLET, FILM COATED ORAL at 12:23

## 2019-03-02 RX ADMIN — OXYCODONE AND ACETAMINOPHEN 1 TABLET(S): 5; 325 TABLET ORAL at 19:37

## 2019-03-02 RX ADMIN — Medication 1000 UNIT(S): at 12:23

## 2019-03-02 RX ADMIN — Medication 1 TABLET(S): at 13:38

## 2019-03-02 RX ADMIN — Medication 81 MILLIGRAM(S): at 12:23

## 2019-03-02 RX ADMIN — Medication 1 TABLET(S): at 12:22

## 2019-03-02 NOTE — PROGRESS NOTE ADULT - SUBJECTIVE AND OBJECTIVE BOX
[facility] - Ans Serv [phone], Office [phone]  Dr Daniels/Dr Castillo / Dr Basil RAMIREZ /Dr Dez westbrook /Dr JAMES Zamudio/Dr Reuben Irizarry/Dr Natalio Hays /Dr BOB Forbes  _______________________________________________________________________________________________    seen and examined today for renal failure  Interval : Serum Creatinine stable  VITALS:  T(F): 99 (03-02-19 @ 09:04), Max: 99.2 (03-01-19 @ 21:50)  HR: 97 (03-02-19 @ 09:04)  BP: 116/71 (03-02-19 @ 09:04)  RR: 18 (03-02-19 @ 09:04)  SpO2: 94% (03-02-19 @ 09:04)    03-01 @ 07:01  -  03-02 @ 07:00  --------------------------------------------------------  IN: 820 mL / OUT: 650 mL / NET: 170 mL    Physical Exam :-  Constitutional: NAD  Neck: Supple.  Respiratory: Bilateral equal breath sounds, + Crackles present.  Cardiovascular: S1, S2 normal, positive Murmur  Gastrointestinal: Bowel Sounds present, soft, non tender.  Extremities: + Edema Feet  Neurological: Alert  Psychiatric: Normal mood, normal affect  Data:-  Allergies :   amoxicillin (Other)  IV Contrast (Flushing (Skin); Nausea)    Hospital Medications:   MEDICATIONS  (STANDING):  amLODIPine   Tablet 10 milliGRAM(s) Oral daily  ascorbic acid 500 milliGRAM(s) Oral daily  aspirin enteric coated 81 milliGRAM(s) Oral daily  atorvastatin 10 milliGRAM(s) Oral at bedtime  bisacodyl 5 milliGRAM(s) Oral at bedtime  calcium carbonate 1250 mG  + Vitamin D (OsCal 500 + D) 1 Tablet(s) Oral three times a day  cholecalciferol 1000 Unit(s) Oral daily  cycloSPORINE  (SandIMMUNE)  Solution 200 milliGRAM(s) Oral two times a day  escitalopram 5 milliGRAM(s) Oral daily  folic acid 1 milliGRAM(s) Oral daily  furosemide   Injectable 20 milliGRAM(s) IV Push two times a day  gabapentin 300 milliGRAM(s) Oral two times a day  heparin  Injectable 5000 Unit(s) SubCutaneous every 8 hours  melatonin 3 milliGRAM(s) Oral at bedtime  metoprolol tartrate 12.5 milliGRAM(s) Oral two times a day  nystatin    Suspension 714875 Unit(s) Swish and Swallow three times a day  polyethylene glycol 3350 17 Gram(s) Oral daily  sodium bicarbonate 650 milliGRAM(s) Oral three times a day  vancomycin  IVPB 1000 milliGRAM(s) IV Intermittent every 48 hours  vitamin B complex with vitamin C 1 Tablet(s) Oral daily    03-02    144  |  109<H>  |  48<H>  ----------------------------<  149<H>  4.9   |  22  |  1.65<H>    Ca    8.2<L>      02 Mar 2019 09:25      Creatinine Trend: 1.65 <--, 1.77 <--, 1.82 <--, 1.71 <--, 1.81 <--, 1.70 <--, 1.61 <--                        7.5    17.6  )-----------( 259      ( 01 Mar 2019 10:02 )             22.7

## 2019-03-02 NOTE — PROGRESS NOTE ADULT - PROBLEM SELECTOR PLAN 1
c/w vancomycin, q48hrs, renal dose. monitor vanc trough.  (initially on Meropenem but later discontinued after culture data: recurrrent strep epidermidis)  blood cultures from 2/14 are negative.  ID f/u appreciated  cyclosporine temporarily held on admission, but later restarted. d/w ID, okay to restart. Previously seen by Dermatology. reconsulted but states no urgent issues so not on board. To c/w steroid sparing agent cyclosporine. levels sent, in therapeutic range.   Today's level however is elevated. will dec dose to 100 mg BID  completed chronic steroids taper for pyoderma.   (she was on Prednisone 5 mg qdaily)  Per orthopedic surgery can be OOB primarily to wheelchair and can 50% weight bear on R leg if straight in immobilizer.  wound care consult and wound vac management appreciated.  LE edema likely due to IVF. will d/c IVF. Cr stable. Lasix started  LE dopplers to r/o DVT  TTE without discrete vegetation  ERIKA pending but likely will not change fact that she will need PICC for 4-6 week of antibx. will not do ERIKA give risk.   Plan for PICC line.

## 2019-03-02 NOTE — SWALLOW BEDSIDE ASSESSMENT ADULT - SLP GENERAL OBSERVATIONS
Patient encountered in bed, upright, eyes closed, appeared alert as evidenced by intermittent verbalizations and hand gestures, on RA, did not respond to orientation questions.  HHA present and attempted to feed pt Ensure via teaspoon.  Pt orally defensive, refusing to accept bolus into oral cavity.  Approx. three 1/2 tsp size sips accepted with minimal oral aperture with persistent, max verbal/tactile cues from HHA.  Pt then raised her hands, shook her head, and said "no more."  Did not follow commands for evaluation purposes.  Difficult to assess vocal quality given pt with limited verbal output.

## 2019-03-02 NOTE — SWALLOW BEDSIDE ASSESSMENT ADULT - ASR SWALLOW ASPIRATION MONITOR
fever/pneumonia/throat clearing/upper respiratory infection/change of breathing pattern/cough/gurgly voice

## 2019-03-02 NOTE — PROGRESS NOTE ADULT - SUBJECTIVE AND OBJECTIVE BOX
Patient is a 78y old  Female who presents with a chief complaint of Cellulitis (02 Mar 2019 10:42)      SUBJECTIVE / OVERNIGHT EVENTS:  edema improving.  mental status much better. awake alert.  refusing to eat.  ate only a little bit.  refused speech eval.   the aide at bedside.       Vital Signs Last 24 Hrs  T(C): 37.1 (02 Mar 2019 16:50), Max: 37.3 (01 Mar 2019 21:50)  T(F): 98.7 (02 Mar 2019 16:50), Max: 99.2 (01 Mar 2019 21:50)  HR: 97 (02 Mar 2019 16:50) (89 - 97)  BP: 140/71 (02 Mar 2019 16:50) (100/63 - 140/71)  BP(mean): --  RR: 18 (02 Mar 2019 16:50) (18 - 18)  SpO2: 94% (02 Mar 2019 16:50) (93% - 94%)  I&O's Summary    01 Mar 2019 07:01  -  02 Mar 2019 07:00  --------------------------------------------------------  IN: 820 mL / OUT: 650 mL / NET: 170 mL    02 Mar 2019 07:01  -  02 Mar 2019 20:49  --------------------------------------------------------  IN: 250 mL / OUT: 0 mL / NET: 250 mL      PHYSICAL EXAM:  GENERAL: NAD, lying in bed, awake alert, morbidly obese  HEAD:  Atraumatic, Normocephalic  EYES: EOMI, PERRLA, conjunctiva and sclera clear  NECK: Supple, No JVD  CHEST/LUNG: mild decrease breath sounds bilaterally; No wheeze   HEART: Regular rate and rhythm; No murmurs, rubs, or gallops  ABDOMEN: Soft, Nontender, Nondistended; Bowel sounds present  Neuro: AAO x 2, no focal deficit  EXTREMITIES:  2+ Peripheral Pulses, No clubbing, cyanosis.  2+ nonpitting edema LE  Back: sacral and thigh wound. +wound vac in place  SKIN: No rashes or lesions    LABS:                        7.2    17.02 )-----------( 288      ( 02 Mar 2019 18:13 )             25.4     03-02    144  |  109<H>  |  48<H>  ----------------------------<  149<H>  4.9   |  22  |  1.65<H>    Ca    8.2<L>      02 Mar 2019 09:25        CAPILLARY BLOOD GLUCOSE                RADIOLOGY & ADDITIONAL TESTS:    Imaging Personally Reviewed:  [x] YES  [ ] NO    Consultant(s) Notes Reviewed:  [x] YES  [ ] NO      MEDICATIONS  (STANDING):  amLODIPine   Tablet 10 milliGRAM(s) Oral daily  ascorbic acid 500 milliGRAM(s) Oral daily  aspirin enteric coated 81 milliGRAM(s) Oral daily  atorvastatin 10 milliGRAM(s) Oral at bedtime  bisacodyl 5 milliGRAM(s) Oral at bedtime  calcium carbonate 1250 mG  + Vitamin D (OsCal 500 + D) 1 Tablet(s) Oral three times a day  cholecalciferol 1000 Unit(s) Oral daily  cycloSPORINE  (SandIMMUNE)  Solution 100 milliGRAM(s) Oral two times a day  escitalopram 5 milliGRAM(s) Oral daily  folic acid 1 milliGRAM(s) Oral daily  furosemide   Injectable 20 milliGRAM(s) IV Push two times a day  gabapentin 300 milliGRAM(s) Oral two times a day  heparin  Injectable 5000 Unit(s) SubCutaneous every 8 hours  melatonin 3 milliGRAM(s) Oral at bedtime  metoprolol tartrate 12.5 milliGRAM(s) Oral two times a day  nystatin    Suspension [id number] Unit(s) Swish and Swallow three times a day  polyethylene glycol 3350 17 Gram(s) Oral daily  vancomycin  IVPB 1000 milliGRAM(s) IV Intermittent every 48 hours  vitamin B complex with vitamin C 1 Tablet(s) Oral daily    MEDICATIONS  (PRN):  acetaminophen   Tablet .. 650 milliGRAM(s) Oral every 6 hours PRN Mild Pain (1 - 3)  oxyCODONE    5 mG/acetaminophen 325 mG 1 Tablet(s) Oral every 4 hours PRN Severe Pain (7 - 10)      Care Discussed with Consultants/Other Providers [x] YES  [ ] NO    HEALTH ISSUES - PROBLEM Dx:  Hyperkalemia: Hyperkalemia  Acute kidney injury superimposed on CKD: Acute kidney injury superimposed on CKD  Acute kidney failure: Acute kidney failure  Pyoderma gangrenosum: Pyoderma gangrenosum  Depression, unspecified depression type: Depression, unspecified depression type  Medication management: Medication management  Gastroesophageal reflux disease, esophagitis presence not specified: Gastroesophageal reflux disease, esophagitis presence not specified  Aortic stenosis, severe: Aortic stenosis, severe  Coronary artery disease involving native coronary artery of native heart without angina pectoris: Coronary artery disease involving native coronary artery of native heart without angina pectoris  Altered mental status, unspecified altered mental status type: Altered mental status, unspecified altered mental status type  Cellulitis of leg, right: Cellulitis of leg, right

## 2019-03-02 NOTE — SWALLOW BEDSIDE ASSESSMENT ADULT - SWALLOW EVAL: RECOMMENDED DIET
Unable to make formal dietary recommendation given pt refusal to participate in evaluation.  Would defer to pt/family wishes and primary team/MD at this time.

## 2019-03-02 NOTE — PROGRESS NOTE ADULT - PROBLEM SELECTOR PLAN 1
labs reviewed. Serum Creatinine stable, Volume Status : high  cont lasix 20mg two times per day  may need to titrate lasix

## 2019-03-02 NOTE — SWALLOW BEDSIDE ASSESSMENT ADULT - SWALLOW EVAL: DIAGNOSIS
Exam significantly limited given pt refusal to participate.  Pt reluctantly accepted approx. three 1/2 tsp size sips of thin liquids from [facility].  Noted to be orally defensive and refusing all other PO trials.

## 2019-03-02 NOTE — SWALLOW BEDSIDE ASSESSMENT ADULT - ADDITIONAL RECOMMENDATIONS
Maintain good oral hygiene.  As per discussion with NP Cher Pal, given pt refusal to participate this service to sign off and will not actively follow.  Please re-consult if pt noted with increased participation.  Would also recommend considering a GOC discussion in regards to provision of nutrition in this patient.

## 2019-03-03 LAB
ANION GAP SERPL CALC-SCNC: 13 MMOL/L — SIGNIFICANT CHANGE UP (ref 5–17)
BUN SERPL-MCNC: 45 MG/DL — HIGH (ref 7–23)
CALCIUM SERPL-MCNC: 8.4 MG/DL — SIGNIFICANT CHANGE UP (ref 8.4–10.5)
CHLORIDE SERPL-SCNC: 107 MMOL/L — SIGNIFICANT CHANGE UP (ref 96–108)
CO2 SERPL-SCNC: 23 MMOL/L — SIGNIFICANT CHANGE UP (ref 22–31)
CREAT SERPL-MCNC: 1.65 MG/DL — HIGH (ref 0.5–1.3)
CYCLOSPORINE SER-MCNC: 339 NG/ML — SIGNIFICANT CHANGE UP (ref 150–400)
GLUCOSE SERPL-MCNC: 107 MG/DL — HIGH (ref 70–99)
HCT VFR BLD CALC: 25.3 % — LOW (ref 34.5–45)
HGB BLD-MCNC: 8 G/DL — LOW (ref 11.5–15.5)
MCHC RBC-ENTMCNC: 26.9 PG — LOW (ref 27–34)
MCHC RBC-ENTMCNC: 31.6 GM/DL — LOW (ref 32–36)
MCV RBC AUTO: 85 FL — SIGNIFICANT CHANGE UP (ref 80–100)
PLATELET # BLD AUTO: 310 K/UL — SIGNIFICANT CHANGE UP (ref 150–400)
POTASSIUM SERPL-MCNC: 5 MMOL/L — SIGNIFICANT CHANGE UP (ref 3.5–5.3)
POTASSIUM SERPL-SCNC: 5 MMOL/L — SIGNIFICANT CHANGE UP (ref 3.5–5.3)
RBC # BLD: 2.98 M/UL — LOW (ref 3.8–5.2)
RBC # FLD: 16.6 % — HIGH (ref 10.3–14.5)
SODIUM SERPL-SCNC: 143 MMOL/L — SIGNIFICANT CHANGE UP (ref 135–145)
WBC # BLD: 19.4 K/UL — HIGH (ref 3.8–10.5)
WBC # FLD AUTO: 19.4 K/UL — HIGH (ref 3.8–10.5)

## 2019-03-03 RX ORDER — FUROSEMIDE 40 MG
20 TABLET ORAL DAILY
Qty: 0 | Refills: 0 | Status: DISCONTINUED | OUTPATIENT
Start: 2019-03-03 | End: 2019-03-04

## 2019-03-03 RX ORDER — GABAPENTIN 400 MG/1
100 CAPSULE ORAL
Qty: 0 | Refills: 0 | Status: DISCONTINUED | OUTPATIENT
Start: 2019-03-03 | End: 2019-03-12

## 2019-03-03 RX ADMIN — GABAPENTIN 300 MILLIGRAM(S): 400 CAPSULE ORAL at 06:01

## 2019-03-03 RX ADMIN — Medication 500000 UNIT(S): at 21:47

## 2019-03-03 RX ADMIN — Medication 1 TABLET(S): at 12:06

## 2019-03-03 RX ADMIN — Medication 1000 UNIT(S): at 12:07

## 2019-03-03 RX ADMIN — Medication 500000 UNIT(S): at 06:02

## 2019-03-03 RX ADMIN — Medication 5 MILLIGRAM(S): at 21:47

## 2019-03-03 RX ADMIN — Medication 500 MILLIGRAM(S): at 12:06

## 2019-03-03 RX ADMIN — Medication 20 MILLIGRAM(S): at 06:01

## 2019-03-03 RX ADMIN — CYCLOSPORINE 100 MILLIGRAM(S): 100 CAPSULE ORAL at 22:06

## 2019-03-03 RX ADMIN — ATORVASTATIN CALCIUM 10 MILLIGRAM(S): 80 TABLET, FILM COATED ORAL at 21:47

## 2019-03-03 RX ADMIN — Medication 12.5 MILLIGRAM(S): at 06:01

## 2019-03-03 RX ADMIN — Medication 1 TABLET(S): at 14:25

## 2019-03-03 RX ADMIN — POLYETHYLENE GLYCOL 3350 17 GRAM(S): 17 POWDER, FOR SOLUTION ORAL at 12:07

## 2019-03-03 RX ADMIN — GABAPENTIN 100 MILLIGRAM(S): 400 CAPSULE ORAL at 17:57

## 2019-03-03 RX ADMIN — ESCITALOPRAM OXALATE 5 MILLIGRAM(S): 10 TABLET, FILM COATED ORAL at 14:25

## 2019-03-03 RX ADMIN — HEPARIN SODIUM 5000 UNIT(S): 5000 INJECTION INTRAVENOUS; SUBCUTANEOUS at 06:00

## 2019-03-03 RX ADMIN — Medication 81 MILLIGRAM(S): at 12:06

## 2019-03-03 RX ADMIN — Medication 1 TABLET(S): at 06:02

## 2019-03-03 RX ADMIN — CYCLOSPORINE 100 MILLIGRAM(S): 100 CAPSULE ORAL at 09:29

## 2019-03-03 RX ADMIN — Medication 500000 UNIT(S): at 14:25

## 2019-03-03 RX ADMIN — AMLODIPINE BESYLATE 10 MILLIGRAM(S): 2.5 TABLET ORAL at 06:02

## 2019-03-03 RX ADMIN — Medication 12.5 MILLIGRAM(S): at 17:59

## 2019-03-03 RX ADMIN — Medication 1 TABLET(S): at 21:47

## 2019-03-03 RX ADMIN — Medication 1 MILLIGRAM(S): at 12:06

## 2019-03-03 RX ADMIN — HEPARIN SODIUM 5000 UNIT(S): 5000 INJECTION INTRAVENOUS; SUBCUTANEOUS at 21:47

## 2019-03-03 RX ADMIN — HEPARIN SODIUM 5000 UNIT(S): 5000 INJECTION INTRAVENOUS; SUBCUTANEOUS at 14:25

## 2019-03-03 NOTE — PROGRESS NOTE ADULT - SUBJECTIVE AND OBJECTIVE BOX
[facility] - [facility] [phone], Office [phone]  Dr Daniels/Dr Castillo / Dr Basil RAMIREZ /Dr Dez westbrook /Dr JAMES Zamudio/Dr Reuben Irizarry/Dr Natalio Hays /Dr BOB Forbes  _______________________________________________________________________________________________    seen and examined today for renal failure  Interval : Serum Creatinine stable, Volume Status : high  VITALS:  T(F): 99.1 (03-03-19 @ 09:17), Max: 99.1 (03-03-19 @ 09:17)  HR: 95 (03-03-19 @ 09:17)  BP: 113/74 (03-03-19 @ 09:17)  RR: 18 (03-03-19 @ 09:17)  SpO2: 95% (03-03-19 @ 09:17)    03-02 @ 07:01  -  03-03 @ 07:00  --------------------------------------------------------  IN: 370 mL / OUT: 450 mL / NET: -80 mL    Physical Exam :-  Constitutional: NAD  Neck: Supple.  Respiratory: Bilateral equal breath sounds, few Crackles present.  Cardiovascular: S1, S2 normal, positive Murmur  Gastrointestinal: Bowel Sounds present, soft, non tender.  Extremities: + Edema Feet  Neurological: Alert   Psychiatric: Normal mood, normal affect  Data:-  Allergies :   amoxicillin (Other)  IV Contrast (Flushing (Skin); Nausea)    Hospital Medications:   MEDICATIONS  (STANDING):  amLODIPine   Tablet 10 milliGRAM(s) Oral daily  ascorbic acid 500 milliGRAM(s) Oral daily  aspirin enteric coated 81 milliGRAM(s) Oral daily  atorvastatin 10 milliGRAM(s) Oral at bedtime  bisacodyl 5 milliGRAM(s) Oral at bedtime  calcium carbonate 1250 mG  + Vitamin D (OsCal 500 + D) 1 Tablet(s) Oral three times a day  cholecalciferol 1000 Unit(s) Oral daily  cycloSPORINE  (SandIMMUNE)  Solution 100 milliGRAM(s) Oral two times a day  escitalopram 5 milliGRAM(s) Oral daily  folic acid 1 milliGRAM(s) Oral daily  furosemide   Injectable 20 milliGRAM(s) IV Push two times a day  gabapentin 300 milliGRAM(s) Oral two times a day  heparin  Injectable 5000 Unit(s) SubCutaneous every 8 hours  melatonin 3 milliGRAM(s) Oral at bedtime  metoprolol tartrate 12.5 milliGRAM(s) Oral two times a day  nystatin    Suspension [id number] Unit(s) Swish and Swallow three times a day  polyethylene glycol 3350 17 Gram(s) Oral daily  vancomycin  IVPB 1000 milliGRAM(s) IV Intermittent every 48 hours  vitamin B complex with vitamin C 1 Tablet(s) Oral daily    03-03    143  |  107  |  45<H>  ----------------------------<  107<H>  5.0   |  23  |  1.65<H>    Ca    8.4      03 Mar 2019 09:35      Creatinine Trend: 1.65 <--, 1.65 <--, 1.77 <--, 1.82 <--, 1.71 <--, 1.81 <--, 1.70 <--                        8.0    19.4  )-----------( 310      ( 03 Mar 2019 09:35 )             25.3

## 2019-03-03 NOTE — PROGRESS NOTE ADULT - PROBLEM SELECTOR PLAN 1
c/w vancomycin, q48hrs, renal dose. monitor vanc trough.  (initially on Meropenem but later discontinued after culture data: recurrrent strep epidermidis)  blood cultures from 2/14 are negative.  ID f/u appreciated  cyclosporine temporarily held on admission, but later restarted. d/w ID, okay to restart. Previously seen by Dermatology. reconsulted but states no urgent issues so not on board. To c/w steroid sparing agent cyclosporine. levels sent, in therapeutic range.   Cyclosporine dose adjusted to 100 mg BID based on levels  completed chronic steroids taper for pyoderma.   (she was on Prednisone 5 mg qdaily)  Per orthopedic surgery can be OOB primarily to wheelchair and can 50% weight bear on R leg if straight in immobilizer.  wound care consult and wound vac management appreciated.  LE edema likely due to IVF. will d/c IVF. Cr stable. Lasix started  LE dopplers to r/o DVT  TTE without discrete vegetation  ERIKA pending but likely will not change fact that she will need PICC for 4-6 week of antibx. will not do ERIKA give risk.   Plan for PICC line.

## 2019-03-03 NOTE — PROGRESS NOTE ADULT - SUBJECTIVE AND OBJECTIVE BOX
CC: f/u for coag negative staph bacteremia    Pt is resting in bed, mostly bedbound, poor PO intake as per aide  no fevers, no new c/o  REVIEW OF SYSTEMS: as above, limited  All other review of systems negative (Comprehensive ROS)    Antimicrobials Day # 18  vancomycin  IVPB 1000 milliGRAM(s) IV Intermittent every 48 hours    Medications Reviewed    Vital Signs Last 24 Hrs  T(C): 37.3 (03 Mar 2019 09:17), Max: 37.3 (03 Mar 2019 09:17)  T(F): 99.1 (03 Mar 2019 09:17), Max: 99.1 (03 Mar 2019 09:17)  HR: 95 (03 Mar 2019 09:17) (78 - 97)  BP: 113/74 (03 Mar 2019 09:17) (101/64 - 160/68)  BP(mean): --  RR: 18 (03 Mar 2019 09:17) (18 - 18)  SpO2: 95% (03 Mar 2019 09:17) (94% - 95%)    PHYSICAL EXAM:  General: no acute distress  Eyes:  anicteric, no conjunctival injection, no discharge  Oropharynx: no lesions or injection 	  Neck: supple, without adenopathy  Lungs: clear to auscultation  Heart: regular rate and rhythm; no murmur, rubs or gallops  Abdomen: soft, nondistended, nontender, without mass or organomegaly  Skin: L flank wounds dressed; R hip VAC, R thigh and knee/leg wounds healed  Extremities: mild edema  Neurologic: alert, generalized weakness    LAB RESULTS:                                              7.2    17.02 )-----------( 288      ( 02 Mar 2019 18:13 )             25.4   03-02    144  |  109<H>  |  48<H>  ----------------------------<  149<H>  4.9   |  22  |  1.65<H>    Ca    8.2<L>      02 Mar 2019 09:25                    MICROBIOLOGY:  RECENT CULTURES reviewed    RADIOLOGY REVIEWED

## 2019-03-03 NOTE — PROGRESS NOTE ADULT - SUBJECTIVE AND OBJECTIVE BOX
Patient is a 78y old  Female who presents with a chief complaint of Cellulitis (03 Mar 2019 11:48)      SUBJECTIVE / OVERNIGHT EVENTS:  lethargic today.   at bedside.  requesting to cut lasix in half.  dec gabapentin dose.  pt open eyes and follow commands but goes back to sleep if not stimulated.  denied pain.        Vital Signs Last 24 Hrs  T(C): 37.5 (03 Mar 2019 13:47), Max: 37.8 (03 Mar 2019 13:00)  T(F): 99.5 (03 Mar 2019 13:47), Max: 100.1 (03 Mar 2019 13:00)  HR: 95 (03 Mar 2019 13:47) (78 - 97)  BP: 105/69 (03 Mar 2019 13:47) (101/64 - 160/68)  BP(mean): --  RR: 20 (03 Mar 2019 13:53) (18 - 20)  SpO2: 97% (03 Mar 2019 13:53) (92% - 97%)  I&O's Summary    02 Mar 2019 07:01  -  03 Mar 2019 07:00  --------------------------------------------------------  IN: 370 mL / OUT: 450 mL / NET: -80 mL      PHYSICAL EXAM:  GENERAL: NAD, lying in bed, awake alert, morbidly obese  HEAD:  Atraumatic, Normocephalic  EYES: EOMI, PERRLA, conjunctiva and sclera clear  NECK: Supple, No JVD  CHEST/LUNG: mild decrease breath sounds bilaterally; No wheeze   HEART: Regular rate and rhythm; No murmurs, rubs, or gallops  ABDOMEN: Soft, Nontender, Nondistended; Bowel sounds present  Neuro: AAO x 2, no focal deficit  EXTREMITIES:  2+ Peripheral Pulses, No clubbing, cyanosis.  2+ nonpitting edema LE  Back: sacral and thigh wound. +wound vac in place  SKIN: No rashes or lesions        LABS:                        8.0    19.4  )-----------( 310      ( 03 Mar 2019 09:35 )             25.3     03-03    143  |  107  |  45<H>  ----------------------------<  107<H>  5.0   |  23  |  1.65<H>    Ca    8.4      03 Mar 2019 09:35        CAPILLARY BLOOD GLUCOSE                RADIOLOGY & ADDITIONAL TESTS:    Imaging Personally Reviewed:  [x] YES  [ ] NO    Consultant(s) Notes Reviewed:  [x] YES  [ ] NO      MEDICATIONS  (STANDING):  ascorbic acid 500 milliGRAM(s) Oral daily  aspirin enteric coated 81 milliGRAM(s) Oral daily  atorvastatin 10 milliGRAM(s) Oral at bedtime  bisacodyl 5 milliGRAM(s) Oral at bedtime  calcium carbonate 1250 mG  + Vitamin D (OsCal 500 + D) 1 Tablet(s) Oral three times a day  cholecalciferol 1000 Unit(s) Oral daily  cycloSPORINE  (SandIMMUNE)  Solution 100 milliGRAM(s) Oral two times a day  escitalopram 5 milliGRAM(s) Oral daily  folic acid 1 milliGRAM(s) Oral daily  furosemide   Injectable 20 milliGRAM(s) IV Push daily  gabapentin 100 milliGRAM(s) Oral two times a day  heparin  Injectable 5000 Unit(s) SubCutaneous every 8 hours  melatonin 3 milliGRAM(s) Oral at bedtime  metoprolol tartrate 12.5 milliGRAM(s) Oral two times a day  nystatin    Suspension [id number] Unit(s) Swish and Swallow three times a day  polyethylene glycol 3350 17 Gram(s) Oral daily  vancomycin  IVPB 1000 milliGRAM(s) IV Intermittent every 48 hours  vitamin B complex with vitamin C 1 Tablet(s) Oral daily    MEDICATIONS  (PRN):  acetaminophen   Tablet .. 650 milliGRAM(s) Oral every 6 hours PRN Mild Pain (1 - 3)  oxyCODONE    5 mG/acetaminophen 325 mG 1 Tablet(s) Oral every 4 hours PRN Severe Pain (7 - 10)      Care Discussed with Consultants/Other Providers [x] YES  [ ] NO    HEALTH ISSUES - PROBLEM Dx:  Hyperkalemia: Hyperkalemia  Acute kidney injury superimposed on CKD: Acute kidney injury superimposed on CKD  Acute kidney failure: Acute kidney failure  Pyoderma gangrenosum: Pyoderma gangrenosum  Depression, unspecified depression type: Depression, unspecified depression type  Medication management: Medication management  Gastroesophageal reflux disease, esophagitis presence not specified: Gastroesophageal reflux disease, esophagitis presence not specified  Aortic stenosis, severe: Aortic stenosis, severe  Coronary artery disease involving native coronary artery of native heart without angina pectoris: Coronary artery disease involving native coronary artery of native heart without angina pectoris  Altered mental status, unspecified altered mental status type: Altered mental status, unspecified altered mental status type  Cellulitis of leg, right: Cellulitis of leg, right

## 2019-03-04 LAB
ANION GAP SERPL CALC-SCNC: 15 MMOL/L — SIGNIFICANT CHANGE UP (ref 5–17)
BUN SERPL-MCNC: 42 MG/DL — HIGH (ref 7–23)
CALCIUM SERPL-MCNC: 8.3 MG/DL — LOW (ref 8.4–10.5)
CHLORIDE SERPL-SCNC: 108 MMOL/L — SIGNIFICANT CHANGE UP (ref 96–108)
CO2 SERPL-SCNC: 23 MMOL/L — SIGNIFICANT CHANGE UP (ref 22–31)
CREAT SERPL-MCNC: 1.58 MG/DL — HIGH (ref 0.5–1.3)
CYCLOSPORINE SER-MCNC: 300 NG/ML — SIGNIFICANT CHANGE UP (ref 150–400)
CYCLOSPORINE SER-MCNC: 666 NG/ML — CRITICAL HIGH (ref 150–400)
GLUCOSE SERPL-MCNC: 127 MG/DL — HIGH (ref 70–99)
POTASSIUM SERPL-MCNC: 5 MMOL/L — SIGNIFICANT CHANGE UP (ref 3.5–5.3)
POTASSIUM SERPL-SCNC: 5 MMOL/L — SIGNIFICANT CHANGE UP (ref 3.5–5.3)
SODIUM SERPL-SCNC: 146 MMOL/L — HIGH (ref 135–145)

## 2019-03-04 RX ORDER — FUROSEMIDE 40 MG
20 TABLET ORAL
Qty: 0 | Refills: 0 | Status: DISCONTINUED | OUTPATIENT
Start: 2019-03-04 | End: 2019-03-08

## 2019-03-04 RX ADMIN — Medication 650 MILLIGRAM(S): at 12:00

## 2019-03-04 RX ADMIN — GABAPENTIN 100 MILLIGRAM(S): 400 CAPSULE ORAL at 19:01

## 2019-03-04 RX ADMIN — HEPARIN SODIUM 5000 UNIT(S): 5000 INJECTION INTRAVENOUS; SUBCUTANEOUS at 21:45

## 2019-03-04 RX ADMIN — Medication 20 MILLIGRAM(S): at 19:01

## 2019-03-04 RX ADMIN — CYCLOSPORINE 100 MILLIGRAM(S): 100 CAPSULE ORAL at 21:55

## 2019-03-04 RX ADMIN — Medication 500000 UNIT(S): at 05:54

## 2019-03-04 RX ADMIN — HEPARIN SODIUM 5000 UNIT(S): 5000 INJECTION INTRAVENOUS; SUBCUTANEOUS at 05:54

## 2019-03-04 RX ADMIN — Medication 1 TABLET(S): at 21:46

## 2019-03-04 RX ADMIN — GABAPENTIN 100 MILLIGRAM(S): 400 CAPSULE ORAL at 06:13

## 2019-03-04 RX ADMIN — Medication 500000 UNIT(S): at 21:45

## 2019-03-04 RX ADMIN — Medication 1 MILLIGRAM(S): at 11:49

## 2019-03-04 RX ADMIN — Medication 12.5 MILLIGRAM(S): at 19:01

## 2019-03-04 RX ADMIN — Medication 650 MILLIGRAM(S): at 11:49

## 2019-03-04 RX ADMIN — Medication 250 MILLIGRAM(S): at 15:11

## 2019-03-04 RX ADMIN — ATORVASTATIN CALCIUM 10 MILLIGRAM(S): 80 TABLET, FILM COATED ORAL at 21:46

## 2019-03-04 RX ADMIN — HEPARIN SODIUM 5000 UNIT(S): 5000 INJECTION INTRAVENOUS; SUBCUTANEOUS at 15:12

## 2019-03-04 RX ADMIN — Medication 20 MILLIGRAM(S): at 05:54

## 2019-03-04 RX ADMIN — Medication 500000 UNIT(S): at 15:12

## 2019-03-04 RX ADMIN — Medication 1 TABLET(S): at 16:08

## 2019-03-04 RX ADMIN — CYCLOSPORINE 100 MILLIGRAM(S): 100 CAPSULE ORAL at 09:25

## 2019-03-04 RX ADMIN — Medication 1000 UNIT(S): at 11:49

## 2019-03-04 RX ADMIN — POLYETHYLENE GLYCOL 3350 17 GRAM(S): 17 POWDER, FOR SOLUTION ORAL at 11:50

## 2019-03-04 RX ADMIN — Medication 81 MILLIGRAM(S): at 11:49

## 2019-03-04 RX ADMIN — Medication 500 MILLIGRAM(S): at 11:49

## 2019-03-04 RX ADMIN — Medication 1 TABLET(S): at 05:54

## 2019-03-04 RX ADMIN — Medication 1 TABLET(S): at 15:12

## 2019-03-04 NOTE — PROGRESS NOTE ADULT - ASSESSMENT
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  F/u blood cultures 2/14 are negative  New L LE DVT noted (IVC filter already in place)  leukocytosis-slowly moderating, no fevers today  Suggest:  Continue Vanco 1 g q 48, f/u level  anticipate 4-6 wk course via PICC  Follow temps and CBC/diff   Local wound care  if fever or leukocytosis or breakthrough bacteremia, may need to switch to Daptomycin  overall prognosis seems poor

## 2019-03-04 NOTE — CHART NOTE - NSCHARTNOTEFT_GEN_A_CORE
Nutrition Follow Up Note  Patient seen for: LOS follow up    Interim events noted. Pt c cellulitis, AMS, YONATAN, fluid overload, had hyperkalemia, now improved. Pt c bacteremia. Noted bedside swallow evaluation was ordered but pt refused.     Source: pt and private aide Gladis- pt greeted RD and then fell asleep     Diet :  pureed, no concentrated potassium, Ensure Enlive x 1 two times daily     As per aide, last week pt was not eating much of anything and so far today she has been taking a few bites of her meals, took 1-2 Ensure Enlive daily now and seems to be more awake and responsive. Reports pt has been taking some Nepro shakes from home and some foods in house. Aide reports pt is currently tolerating pureed foods well. Reports she has some Howard packets from before and has been giving them to pt. Aide aware of importance of protein intake and has been trying to feed pt protein dense foods.       Daily Weight in k (-), Weight in k (), pt c +3 generalized edema, possibly c some wt loss, would monitor       Pertinent Medications: MEDICATIONS  (STANDING):  ascorbic acid 500 milliGRAM(s) Oral daily  aspirin enteric coated 81 milliGRAM(s) Oral daily  atorvastatin 10 milliGRAM(s) Oral at bedtime  bisacodyl 5 milliGRAM(s) Oral at bedtime  calcium carbonate 1250 mG  + Vitamin D (OsCal 500 + D) 1 Tablet(s) Oral three times a day  cholecalciferol 1000 Unit(s) Oral daily  cycloSPORINE  (SandIMMUNE)  Solution 100 milliGRAM(s) Oral two times a day  escitalopram 5 milliGRAM(s) Oral daily  folic acid 1 milliGRAM(s) Oral daily  furosemide   Injectable 20 milliGRAM(s) IV Push two times a day  gabapentin 100 milliGRAM(s) Oral two times a day  heparin  Injectable 5000 Unit(s) SubCutaneous every 8 hours  melatonin 3 milliGRAM(s) Oral at bedtime  metoprolol tartrate 12.5 milliGRAM(s) Oral two times a day  nystatin    Suspension [id number] Unit(s) Swish and Swallow three times a day  polyethylene glycol 3350 17 Gram(s) Oral daily  vancomycin  IVPB 1000 milliGRAM(s) IV Intermittent every 48 hours  vitamin B complex with vitamin C 1 Tablet(s) Oral daily    MEDICATIONS  (PRN):  acetaminophen   Tablet .. 650 milliGRAM(s) Oral every 6 hours PRN Mild Pain (1 - 3)  oxyCODONE    5 mG/acetaminophen 325 mG 1 Tablet(s) Oral every 4 hours PRN Severe Pain (7 - 10)    Pertinent Labs:  @ 10:57: Na 146<H>, BUN 42<H>, Cr 1.58<H>, <H>, K+ 5.0, Phos --, Mg --, Alk Phos --, ALT/SGPT --, AST/SGOT --, HbA1c --    Finger Sticks: None pertinent to address at this time.       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 per chart   Edema: +3 generalized edema     Estimated Needs:   [x] no change since previous assessment      Previous Nutrition Diagnosis: increased nutrient needs   Nutrition Diagnosis continues, addressed c promotion of protein rich foods and supplements     New Nutrition Diagnosis: inadequate oral intake   Related to: suboptimal appetite, AMS   As evidenced by: reports of poor PO intake      Interventions: continue supplements, encourage PO intake     Recommend  1) Continue c purred diet, would consider liberalizing potassium restriction given potassium levels are now WNL, monitor renal indices.   2) Consider Nepro supplement instead of Ensure Enlive if desire for low potassium supplement.  3) Discussed c aide to continue to encourage consumption of nutrient/protein dense foods at all meals.     Monitoring and Evaluation:     Continue to monitor Nutritional intake, Tolerance to diet prescription, weights, labs, skin integrity    RD remains available upon request and will follow up per protocol  Heidi Cui, MS, RD, , [facility] #[id number] Nutrition Follow Up Note  Patient seen for: LOS follow up    Interim events noted. Pt c cellulitis, AMS, YONATAN, fluid overload, had hyperkalemia, now improved. Pt c bacteremia. Noted bedside swallow evaluation was ordered but pt refused.     Source: pt and private aide Gladis- pt greeted RD and then fell asleep     Diet :  pureed, no concentrated potassium, Ensure Enlive x 1 two times daily     As per aide, last week pt was not eating much of anything and so far today she has been taking a few bites of her meals, took 1-2 Ensure Enlive daily now and seems to be more awake and responsive. Reports pt has been taking some Nepro shakes from home and some foods in house. Aide reports pt is currently tolerating pureed foods well. Reports she has some Howard packets from before and has been giving them to pt. Aide aware of importance of protein intake and has been trying to feed pt protein dense foods.       Daily Weight in k (-), Weight in k (), pt c +3 generalized edema, possibly c some wt loss, would monitor       Pertinent Medications: MEDICATIONS  (STANDING):  ascorbic acid 500 milliGRAM(s) Oral daily  aspirin enteric coated 81 milliGRAM(s) Oral daily  atorvastatin 10 milliGRAM(s) Oral at bedtime  bisacodyl 5 milliGRAM(s) Oral at bedtime  calcium carbonate 1250 mG  + Vitamin D (OsCal 500 + D) 1 Tablet(s) Oral three times a day  cholecalciferol 1000 Unit(s) Oral daily  cycloSPORINE  (SandIMMUNE)  Solution 100 milliGRAM(s) Oral two times a day  escitalopram 5 milliGRAM(s) Oral daily  folic acid 1 milliGRAM(s) Oral daily  furosemide   Injectable 20 milliGRAM(s) IV Push two times a day  gabapentin 100 milliGRAM(s) Oral two times a day  heparin  Injectable 5000 Unit(s) SubCutaneous every 8 hours  melatonin 3 milliGRAM(s) Oral at bedtime  metoprolol tartrate 12.5 milliGRAM(s) Oral two times a day  nystatin    Suspension [id number] Unit(s) Swish and Swallow three times a day  polyethylene glycol 3350 17 Gram(s) Oral daily  vancomycin  IVPB 1000 milliGRAM(s) IV Intermittent every 48 hours  vitamin B complex with vitamin C 1 Tablet(s) Oral daily    MEDICATIONS  (PRN):  acetaminophen   Tablet .. 650 milliGRAM(s) Oral every 6 hours PRN Mild Pain (1 - 3)  oxyCODONE    5 mG/acetaminophen 325 mG 1 Tablet(s) Oral every 4 hours PRN Severe Pain (7 - 10)    Pertinent Labs:  @ 10:57: Na 146<H>, BUN 42<H>, Cr 1.58<H>, <H>, K+ 5.0, Phos --, Mg --, Alk Phos --, ALT/SGPT --, AST/SGOT --, HbA1c --    Finger Sticks: None pertinent to address at this time.       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 per chart   Edema: +3 generalized edema     Estimated Needs:   [x] no change since previous assessment      Previous Nutrition Diagnosis: increased nutrient needs   Nutrition Diagnosis continues, addressed c promotion of protein rich foods and supplements     New Nutrition Diagnosis: inadequate oral intake   Related to: suboptimal appetite, AMS   As evidenced by: reports of poor PO intake      Interventions: continue supplements, encourage PO intake     Recommend  1) Continue c purred diet, would consider liberalizing potassium restriction given potassium levels are now WNL, monitor renal indices.   2) Consider Nepro supplement instead of Ensure Enlive if desire for low potassium supplement.  3) Discussed c aide to continue to encourage consumption of nutrient/protein dense foods at all meals.   4) Recommend Howard x 2 packets daily.   Monitoring and Evaluation:     Continue to monitor Nutritional intake, Tolerance to diet prescription, weights, labs, skin integrity    RD remains available upon request and will follow up per protocol  Heidi Cui, MS, RD, , [facility] #[phone]

## 2019-03-04 NOTE — PROGRESS NOTE ADULT - SUBJECTIVE AND OBJECTIVE BOX
CC: f/u for coag negative staph bacteremia    Pt is resting in bed, mostly bedbound, poor PO intake  no fevers, no new c/o  REVIEW OF SYSTEMS: as above, limited  All other review of systems negative (Comprehensive ROS)    Antimicrobials Day # 19  vancomycin  IVPB 1000 milliGRAM(s) IV Intermittent every 48 hours    Medications Reviewed    Vital Signs Last 24 Hrs  T(C): 37.8 (04 Mar 2019 10:52), Max: 37.8 (03 Mar 2019 13:00)  T(F): 100 (04 Mar 2019 10:52), Max: 100.1 (03 Mar 2019 13:00)  HR: 106 (04 Mar 2019 10:52) (94 - 106)  BP: 125/77 (04 Mar 2019 10:52) (104/68 - 134/71)  BP(mean): --  RR: 18 (04 Mar 2019 10:52) (18 - 20)  SpO2: 94% (04 Mar 2019 10:52) (92% - 97%)    PHYSICAL EXAM:  General: no acute distress  Eyes:  anicteric, no conjunctival injection, no discharge  Oropharynx: no lesions or injection 	  Neck: supple, without adenopathy  Lungs: clear to auscultation  Heart: regular rate and rhythm; no murmur, rubs or gallops  Abdomen: soft, nondistended, nontender, without mass or organomegaly  Skin: L flank wounds dressed; R hip VAC, R thigh and knee/leg wounds healed  Extremities: mild edema  Neurologic: alert, generalized weakness    LAB RESULTS:                                                         8.0    19.4  )-----------( 310      ( 03 Mar 2019 09:35 )             25.3   03-04    146<H>  |  108  |  42<H>  ----------------------------<  127<H>  5.0   |  23  |  1.58<H>    Ca    8.3<L>      04 Mar 2019 10:57              MICROBIOLOGY:  RECENT CULTURES reviewed    RADIOLOGY REVIEWED

## 2019-03-04 NOTE — PROGRESS NOTE ADULT - ATTENDING COMMENTS
d/w ID Dr. Grubbs. PICC to be placed given midline may not be adequate for long term vanco.  d/c planning after PICC line and once her edema a little better.  D/w the  and he is agreeable with the plan.    All questions answered.     - Dr. DENISSE Palumbo ([facility])  - [phone]

## 2019-03-04 NOTE — PROGRESS NOTE ADULT - SUBJECTIVE AND OBJECTIVE BOX
[facility] - KIRTI Castillo / KIRTI Gracia / RITA Daniels/ KIRTI Zamudio/ KIRTI Irizarry/ LOLLY Hays / CLINT Forbes / LEBRON Boss  ---------------------------------------------------------------------------------------------------------------  seen and examined today for YONATAN  Interval : serum creatinine much improved  VITALS:  T(F): 99.3 (03-04-19 @ 05:55), Max: 100.1 (03-03-19 @ 13:00)  HR: 96 (03-04-19 @ 05:55)  BP: 104/68 (03-04-19 @ 05:55)  RR: 18 (03-04-19 @ 05:55)  SpO2: 94% (03-04-19 @ 05:55)  Wt(kg): --    03-03 @ 07:01  -  03-04 @ 07:00  --------------------------------------------------------  IN: 100 mL / OUT: 1000 mL / NET: -900 mL      Physical Exam :-  Constitutional: NAD  Neck: Supple.  Respiratory: Bilateral equal breath sounds,  Cardiovascular: S1, S2 normal,  Gastrointestinal: Bowel Sounds present, soft, non tender.  Extremities: Anasarca 2+  Neurological: Alert and Oriented x 1, no focal deficits  Psychiatric: Normal mood, normal affect  Data:-  Allergies :   amoxicillin (Other)  IV Contrast (Flushing (Skin); Nausea)    Hospital Medications:   MEDICATIONS  (STANDING):  ascorbic acid 500 milliGRAM(s) Oral daily  aspirin enteric coated 81 milliGRAM(s) Oral daily  atorvastatin 10 milliGRAM(s) Oral at bedtime  bisacodyl 5 milliGRAM(s) Oral at bedtime  calcium carbonate 1250 mG  + Vitamin D (OsCal 500 + D) 1 Tablet(s) Oral three times a day  cholecalciferol 1000 Unit(s) Oral daily  cycloSPORINE  (SandIMMUNE)  Solution 100 milliGRAM(s) Oral two times a day  escitalopram 5 milliGRAM(s) Oral daily  folic acid 1 milliGRAM(s) Oral daily  furosemide   Injectable 20 milliGRAM(s) IV Push daily  gabapentin 100 milliGRAM(s) Oral two times a day  heparin  Injectable 5000 Unit(s) SubCutaneous every 8 hours  melatonin 3 milliGRAM(s) Oral at bedtime  metoprolol tartrate 12.5 milliGRAM(s) Oral two times a day  nystatin    Suspension [id number] Unit(s) Swish and Swallow three times a day  polyethylene glycol 3350 17 Gram(s) Oral daily  vancomycin  IVPB 1000 milliGRAM(s) IV Intermittent every 48 hours  vitamin B complex with vitamin C 1 Tablet(s) Oral daily    03-03    143  |  107  |  45<H>  ----------------------------<  107<H>  5.0   |  23  |  1.65<H>    Ca    8.4      03 Mar 2019 09:35      Creatinine Trend: 1.65 <--, 1.65 <--, 1.77 <--, 1.82 <--, 1.71 <--, 1.81 <--, 1.70 <--                        8.0    19.4  )-----------( 310      ( 03 Mar 2019 09:35 )             25.3

## 2019-03-04 NOTE — PROGRESS NOTE ADULT - SUBJECTIVE AND OBJECTIVE BOX
Patient is a 78y old  Female who presents with a chief complaint of Cellulitis (04 Mar 2019 12:41)      SUBJECTIVE / OVERNIGHT EVENTS:  more awake alert.  the  at bedside.  eating better today.  no cp no sob.  Cr better.  arm still edematous   finally agree with BID dosing of Lasix.         Vital Signs Last 24 Hrs  T(C): 37.2 (04 Mar 2019 16:03), Max: 37.8 (04 Mar 2019 10:52)  T(F): 99 (04 Mar 2019 16:03), Max: 100 (04 Mar 2019 10:52)  HR: 101 (04 Mar 2019 16:03) (94 - 106)  BP: 120/76 (04 Mar 2019 16:03) (104/68 - 125/77)  BP(mean): --  RR: 18 (04 Mar 2019 16:03) (18 - 18)  SpO2: 94% (04 Mar 2019 16:03) (93% - 94%)  I&O's Summary    03 Mar 2019 07:01  -  04 Mar 2019 07:00  --------------------------------------------------------  IN: 100 mL / OUT: 1000 mL / NET: -900 mL    04 Mar 2019 07:01  -  04 Mar 2019 18:46  --------------------------------------------------------  IN: 750 mL / OUT: 300 mL / NET: 450 mL        PHYSICAL EXAM:  GENERAL: NAD, lying in bed, awake alert, morbidly obese  HEAD:  Atraumatic, Normocephalic  EYES: EOMI, PERRLA, conjunctiva and sclera clear  NECK: Supple, No JVD  CHEST/LUNG: mild decrease breath sounds bilaterally; No wheeze   HEART: Regular rate and rhythm; No murmurs, rubs, or gallops  ABDOMEN: Soft, Nontender, Nondistended; Bowel sounds present  Neuro: AAO x 2, no focal deficit  EXTREMITIES:  2+ Peripheral Pulses, No clubbing, cyanosis.  2+ nonpitting edema LE, UE 2-3+ edema  Back: sacral and thigh wound. +wound vac in place  SKIN: No rashes or lesions      LABS:                        8.0    19.4  )-----------( 310      ( 03 Mar 2019 09:35 )             25.3     03-04    146<H>  |  108  |  42<H>  ----------------------------<  127<H>  5.0   |  23  |  1.58<H>    Ca    8.3<L>      04 Mar 2019 10:57        CAPILLARY BLOOD GLUCOSE                RADIOLOGY & ADDITIONAL TESTS:    Imaging Personally Reviewed:  [x] YES  [ ] NO    Consultant(s) Notes Reviewed:  [x] YES  [ ] NO      MEDICATIONS  (STANDING):  ascorbic acid 500 milliGRAM(s) Oral daily  aspirin enteric coated 81 milliGRAM(s) Oral daily  atorvastatin 10 milliGRAM(s) Oral at bedtime  bisacodyl 5 milliGRAM(s) Oral at bedtime  calcium carbonate 1250 mG  + Vitamin D (OsCal 500 + D) 1 Tablet(s) Oral three times a day  cholecalciferol 1000 Unit(s) Oral daily  cycloSPORINE  (SandIMMUNE)  Solution 100 milliGRAM(s) Oral two times a day  escitalopram 5 milliGRAM(s) Oral daily  folic acid 1 milliGRAM(s) Oral daily  furosemide   Injectable 20 milliGRAM(s) IV Push two times a day  gabapentin 100 milliGRAM(s) Oral two times a day  heparin  Injectable 5000 Unit(s) SubCutaneous every 8 hours  melatonin 3 milliGRAM(s) Oral at bedtime  metoprolol tartrate 12.5 milliGRAM(s) Oral two times a day  nystatin    Suspension [id number] Unit(s) Swish and Swallow three times a day  polyethylene glycol 3350 17 Gram(s) Oral daily  vancomycin  IVPB 1000 milliGRAM(s) IV Intermittent every 48 hours  vitamin B complex with vitamin C 1 Tablet(s) Oral daily    MEDICATIONS  (PRN):  acetaminophen   Tablet .. 650 milliGRAM(s) Oral every 6 hours PRN Mild Pain (1 - 3)  oxyCODONE    5 mG/acetaminophen 325 mG 1 Tablet(s) Oral every 4 hours PRN Severe Pain (7 - 10)      Care Discussed with Consultants/Other Providers [x] YES  [ ] NO    HEALTH ISSUES - PROBLEM Dx:  Hyperkalemia: Hyperkalemia  Acute kidney injury superimposed on CKD: Acute kidney injury superimposed on CKD  Acute kidney failure: Acute kidney failure  Pyoderma gangrenosum: Pyoderma gangrenosum  Depression, unspecified depression type: Depression, unspecified depression type  Medication management: Medication management  Gastroesophageal reflux disease, esophagitis presence not specified: Gastroesophageal reflux disease, esophagitis presence not specified  Aortic stenosis, severe: Aortic stenosis, severe  Coronary artery disease involving native coronary artery of native heart without angina pectoris: Coronary artery disease involving native coronary artery of native heart without angina pectoris  Altered mental status, unspecified altered mental status type: Altered mental status, unspecified altered mental status type  Cellulitis of leg, right: Cellulitis of leg, right

## 2019-03-05 LAB
ANION GAP SERPL CALC-SCNC: 14 MMOL/L — SIGNIFICANT CHANGE UP (ref 5–17)
BLD GP AB SCN SERPL QL: POSITIVE — SIGNIFICANT CHANGE UP
BUN SERPL-MCNC: 40 MG/DL — HIGH (ref 7–23)
CALCIUM SERPL-MCNC: 7.9 MG/DL — LOW (ref 8.4–10.5)
CHLORIDE SERPL-SCNC: 108 MMOL/L — SIGNIFICANT CHANGE UP (ref 96–108)
CO2 SERPL-SCNC: 24 MMOL/L — SIGNIFICANT CHANGE UP (ref 22–31)
CREAT SERPL-MCNC: 1.58 MG/DL — HIGH (ref 0.5–1.3)
CULTURE RESULTS: SIGNIFICANT CHANGE UP
CULTURE RESULTS: SIGNIFICANT CHANGE UP
GLUCOSE SERPL-MCNC: 137 MG/DL — HIGH (ref 70–99)
HCT VFR BLD CALC: 23.2 % — LOW (ref 34.5–45)
HCT VFR BLD CALC: 24.2 % — LOW (ref 34.5–45)
HGB BLD-MCNC: 6.7 G/DL — CRITICAL LOW (ref 11.5–15.5)
HGB BLD-MCNC: 7.6 G/DL — LOW (ref 11.5–15.5)
MCHC RBC-ENTMCNC: 25.9 PG — LOW (ref 27–34)
MCHC RBC-ENTMCNC: 27.2 PG — SIGNIFICANT CHANGE UP (ref 27–34)
MCHC RBC-ENTMCNC: 28.9 GM/DL — LOW (ref 32–36)
MCHC RBC-ENTMCNC: 31.4 GM/DL — LOW (ref 32–36)
MCV RBC AUTO: 86.7 FL — SIGNIFICANT CHANGE UP (ref 80–100)
MCV RBC AUTO: 89.6 FL — SIGNIFICANT CHANGE UP (ref 80–100)
PLATELET # BLD AUTO: 280 K/UL — SIGNIFICANT CHANGE UP (ref 150–400)
PLATELET # BLD AUTO: 287 K/UL — SIGNIFICANT CHANGE UP (ref 150–400)
POTASSIUM SERPL-MCNC: 4.5 MMOL/L — SIGNIFICANT CHANGE UP (ref 3.5–5.3)
POTASSIUM SERPL-SCNC: 4.5 MMOL/L — SIGNIFICANT CHANGE UP (ref 3.5–5.3)
RBC # BLD: 2.59 M/UL — LOW (ref 3.8–5.2)
RBC # BLD: 2.79 M/UL — LOW (ref 3.8–5.2)
RBC # FLD: 16.6 % — HIGH (ref 10.3–14.5)
RBC # FLD: 17.4 % — HIGH (ref 10.3–14.5)
RH IG SCN BLD-IMP: POSITIVE — SIGNIFICANT CHANGE UP
SODIUM SERPL-SCNC: 146 MMOL/L — HIGH (ref 135–145)
SPECIMEN SOURCE: SIGNIFICANT CHANGE UP
SPECIMEN SOURCE: SIGNIFICANT CHANGE UP
WBC # BLD: 20.81 K/UL — HIGH (ref 3.8–10.5)
WBC # BLD: 23.1 K/UL — HIGH (ref 3.8–10.5)
WBC # FLD AUTO: 20.81 K/UL — HIGH (ref 3.8–10.5)
WBC # FLD AUTO: 23.1 K/UL — HIGH (ref 3.8–10.5)

## 2019-03-05 PROCEDURE — 99232 SBSQ HOSP IP/OBS MODERATE 35: CPT

## 2019-03-05 RX ORDER — SODIUM HYPOCHLORITE 0.125 %
1 SOLUTION, NON-ORAL MISCELLANEOUS
Qty: 0 | Refills: 0 | Status: DISCONTINUED | OUTPATIENT
Start: 2019-03-05 | End: 2019-03-21

## 2019-03-05 RX ADMIN — Medication 81 MILLIGRAM(S): at 13:16

## 2019-03-05 RX ADMIN — CYCLOSPORINE 100 MILLIGRAM(S): 100 CAPSULE ORAL at 08:15

## 2019-03-05 RX ADMIN — OXYCODONE AND ACETAMINOPHEN 1 TABLET(S): 5; 325 TABLET ORAL at 18:01

## 2019-03-05 RX ADMIN — Medication 650 MILLIGRAM(S): at 14:16

## 2019-03-05 RX ADMIN — OXYCODONE AND ACETAMINOPHEN 1 TABLET(S): 5; 325 TABLET ORAL at 08:55

## 2019-03-05 RX ADMIN — Medication 500000 UNIT(S): at 22:41

## 2019-03-05 RX ADMIN — Medication 500000 UNIT(S): at 06:21

## 2019-03-05 RX ADMIN — Medication 1 MILLIGRAM(S): at 13:15

## 2019-03-05 RX ADMIN — GABAPENTIN 100 MILLIGRAM(S): 400 CAPSULE ORAL at 18:01

## 2019-03-05 RX ADMIN — Medication 500 MILLIGRAM(S): at 13:15

## 2019-03-05 RX ADMIN — Medication 20 MILLIGRAM(S): at 18:01

## 2019-03-05 RX ADMIN — OXYCODONE AND ACETAMINOPHEN 1 TABLET(S): 5; 325 TABLET ORAL at 18:55

## 2019-03-05 RX ADMIN — Medication 500000 UNIT(S): at 13:17

## 2019-03-05 RX ADMIN — Medication 1 TABLET(S): at 06:21

## 2019-03-05 RX ADMIN — Medication 1 APPLICATION(S): at 20:40

## 2019-03-05 RX ADMIN — Medication 1000 UNIT(S): at 13:15

## 2019-03-05 RX ADMIN — Medication 1 TABLET(S): at 13:15

## 2019-03-05 RX ADMIN — Medication 650 MILLIGRAM(S): at 13:16

## 2019-03-05 RX ADMIN — GABAPENTIN 100 MILLIGRAM(S): 400 CAPSULE ORAL at 06:22

## 2019-03-05 RX ADMIN — Medication 20 MILLIGRAM(S): at 06:21

## 2019-03-05 RX ADMIN — ESCITALOPRAM OXALATE 5 MILLIGRAM(S): 10 TABLET, FILM COATED ORAL at 13:16

## 2019-03-05 RX ADMIN — HEPARIN SODIUM 5000 UNIT(S): 5000 INJECTION INTRAVENOUS; SUBCUTANEOUS at 22:41

## 2019-03-05 RX ADMIN — OXYCODONE AND ACETAMINOPHEN 1 TABLET(S): 5; 325 TABLET ORAL at 08:05

## 2019-03-05 RX ADMIN — Medication 1 TABLET(S): at 22:40

## 2019-03-05 RX ADMIN — ATORVASTATIN CALCIUM 10 MILLIGRAM(S): 80 TABLET, FILM COATED ORAL at 22:40

## 2019-03-05 RX ADMIN — HEPARIN SODIUM 5000 UNIT(S): 5000 INJECTION INTRAVENOUS; SUBCUTANEOUS at 13:16

## 2019-03-05 RX ADMIN — HEPARIN SODIUM 5000 UNIT(S): 5000 INJECTION INTRAVENOUS; SUBCUTANEOUS at 06:21

## 2019-03-05 RX ADMIN — CYCLOSPORINE 100 MILLIGRAM(S): 100 CAPSULE ORAL at 22:41

## 2019-03-05 NOTE — ADVANCED PRACTICE NURSE CONSULT - RECOMMEDATIONS
Will recommend the followin. Right heel: apply Cavilon daily, continue to monitor  2. Continue with z-eleni boots for off-loading  3. continue with turning and positioning  4. Nutrition support as pt condition allows  Tx plan discussed with RN

## 2019-03-05 NOTE — PROVIDER CONTACT NOTE (CRITICAL VALUE NOTIFICATION) - ASSESSMENT
Wound vac in place. No acute distress.
Pt. is alert, aox2, improved mental status today. VSS
pt denies pain, dizziness SOB.

## 2019-03-05 NOTE — PROGRESS NOTE ADULT - SUBJECTIVE AND OBJECTIVE BOX
CC: f/u for coag negative staph bacteremia    Pt is resting in bed, mostly bedbound, poor PO intake  no fevers, no new c/o  REVIEW OF SYSTEMS: as above, limited  All other review of systems negative (Comprehensive ROS)    Antimicrobials Day # 20  vancomycin  IVPB 1000 milliGRAM(s) IV Intermittent every 48 hours    Medications Reviewed    Vital Signs Last 24 Hrs  T(C): 37.4 (05 Mar 2019 11:23), Max: 37.4 (05 Mar 2019 11:23)  T(F): 99.3 (05 Mar 2019 11:23), Max: 99.3 (05 Mar 2019 11:23)  HR: 104 (05 Mar 2019 11:23) (98 - 104)  BP: 100/66 (05 Mar 2019 11:23) (100/66 - 126/72)  BP(mean): --  RR: 18 (05 Mar 2019 11:23) (18 - 18)  SpO2: 94% (05 Mar 2019 11:23) (93% - 94%)    PHYSICAL EXAM:  General: no acute distress  Eyes:  anicteric, no conjunctival injection, no discharge  Oropharynx: no lesions or injection 	  Neck: supple, without adenopathy  Lungs: clear to auscultation  Heart: regular rate and rhythm; no murmur, rubs or gallops  Abdomen: soft, nondistended, nontender, without mass or organomegaly  Skin: L flank wounds dressed; wound care assessment reviewed  Extremities: mild edema  Neurologic: alert, generalized weakness    LAB RESULTS:                                               03-05    146<H>  |  108  |  40<H>  ----------------------------<  137<H>  4.5   |  24  |  1.58<H>    Ca    7.9<L>      05 Mar 2019 11:04                  MICROBIOLOGY:  RECENT CULTURES reviewed    RADIOLOGY REVIEWED

## 2019-03-05 NOTE — PROGRESS NOTE ADULT - ASSESSMENT
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  F/u blood cultures 2/14 are negative  New L LE DVT noted (IVC filter already in place)  leukocytosis-slowly moderating, no fevers   Suggest:  Continue Vanco 1 g q 48, f/u level  anticipate 4-6 wk course via PICC  Follow temps and CBC/diff   Local wound care  if fever or leukocytosis or breakthrough bacteremia, may need to switch to Daptomycin  overall prognosis seems poor  d/w wound care NP, vac was removed and wound appears worse with fluorescent green drainage, there's no cellulitis or fistulous drainage  wound issues remain mostly local issue and broader antimicrobial coverage will not help, plan to reconsult derm for management pyoderma d/w wound care NP

## 2019-03-05 NOTE — PROGRESS NOTE ADULT - PROBLEM SELECTOR PLAN 1
c/w vancomycin, q48hrs, renal dose. monitor vanc trough.  (initially on Meropenem but later discontinued after culture data: recurrrent strep epidermidis)  blood cultures from 2/14 are negative.  ID f/u appreciated  cyclosporine temporarily held on admission, but later restarted. d/w ID, okay to restart. Previously seen by Dermatology. reconsulted but states no urgent issues so not on board. To c/w steroid sparing agent cyclosporine. levels sent, in therapeutic range.   Cyclosporine dose adjusted to 100 mg BID based on levels  completed chronic steroids taper for pyoderma.   (she was on Prednisone 5 mg qdaily)  Per orthopedic surgery can be OOB primarily to wheelchair and can 50% weight bear on R leg if straight in immobilizer.  wound care consult and wound vac management appreciated.  LE edema likely due to IVF. will d/c IVF. Cr stable. Lasix started  LE dopplers to r/o DVT  TTE without discrete vegetation  ERIKA pending but likely will not change fact that she will need PICC for 4-6 week of antibx. will not do ERIKA give risk.   Plan for PICC line. renal clearance appreciated.

## 2019-03-05 NOTE — PROGRESS NOTE ADULT - SUBJECTIVE AND OBJECTIVE BOX
Patient seen and examined  no complaints    amoxicillin (Other)  IV Contrast (Flushing (Skin); Nausea)    Hospital Medications:   MEDICATIONS  (STANDING):  ascorbic acid 500 milliGRAM(s) Oral daily  aspirin enteric coated 81 milliGRAM(s) Oral daily  atorvastatin 10 milliGRAM(s) Oral at bedtime  bisacodyl 5 milliGRAM(s) Oral at bedtime  calcium carbonate 1250 mG  + Vitamin D (OsCal 500 + D) 1 Tablet(s) Oral three times a day  cholecalciferol 1000 Unit(s) Oral daily  cycloSPORINE  (SandIMMUNE)  Solution 100 milliGRAM(s) Oral two times a day  escitalopram 5 milliGRAM(s) Oral daily  folic acid 1 milliGRAM(s) Oral daily  furosemide   Injectable 20 milliGRAM(s) IV Push two times a day  gabapentin 100 milliGRAM(s) Oral two times a day  heparin  Injectable 5000 Unit(s) SubCutaneous every 8 hours  melatonin 3 milliGRAM(s) Oral at bedtime  metoprolol tartrate 12.5 milliGRAM(s) Oral two times a day  nystatin    Suspension [id number] Unit(s) Swish and Swallow three times a day  polyethylene glycol 3350 17 Gram(s) Oral daily  vancomycin  IVPB 1000 milliGRAM(s) IV Intermittent every 48 hours  vitamin B complex with vitamin C 1 Tablet(s) Oral daily        VITALS:  T(F): 99.3 (03-05-19 @ 11:23), Max: 99.3 (03-05-19 @ 11:23)  HR: 104 (03-05-19 @ 11:23)  BP: 100/66 (03-05-19 @ 11:23)  RR: 18 (03-05-19 @ 11:23)  SpO2: 94% (03-05-19 @ 11:23)  Wt(kg): --    03-04 @ 07:01  -  03-05 @ 07:00  --------------------------------------------------------  IN: 850 mL / OUT: 550 mL / NET: 300 mL    03-05 @ 07:01  -  03-05 @ 14:21  --------------------------------------------------------  IN: 280 mL / OUT: 600 mL / NET: -320 mL        Physical Exam :-  Constitutional: NAD  Neck: Supple.  Respiratory: Bilateral equal breath sounds,  Cardiovascular: S1, S2 normal,  Gastrointestinal: Bowel Sounds present, soft, non tender.  Extremities: Anasarca 2+  Neurological: Alert and Oriented x 1, no focal deficits  Psychiatric: Normal mood, normal affect    LABS:  03-05    146<H>  |  108  |  40<H>  ----------------------------<  137<H>  4.5   |  24  |  1.58<H>    Ca    7.9<L>      05 Mar 2019 11:04      Creatinine Trend: 1.58 <--, 1.58 <--, 1.65 <--, 1.65 <--, 1.77 <--, 1.82 <--, 1.71 <--    Urine Studies:  Urinalysis Basic - ( 26 Feb 2019 18:27 )    Color:  / Appearance:  / SG:  / pH:   Gluc:  / Ketone:   / Bili:  / Urobili:    Blood:  / Protein:  / Nitrite:    Leuk Esterase:  / RBC: 1 /hpf / WBC 7 /HPF   Sq Epi:  / Non Sq Epi: 1 / Bacteria: Occasional      Protein/Creatinine Ratio Calculation: 0.2 Ratio (02-26 @ 21:37)  Creatinine, Random Urine: 229 mg/dL (02-26 @ 21:37)  Osmolality, Random Urine: 509 mos/kg (02-26 @ 18:04)  Chloride, Random Urine: <35 mmol/L (02-26 @ 16:26)  Sodium, Random Urine: <20 mmol/L (02-26 @ 16:26)  Potassium, Random Urine: 70 mmol/L (02-26 @ 16:26)    RADIOLOGY & ADDITIONAL STUDIES:

## 2019-03-05 NOTE — ADVANCED PRACTICE NURSE CONSULT - REASON FOR CONSULT
Requested by staff to assess skin status: right heel. The pt was seen by the Wound Care Team: PA, PT Wound, and CWCN. See progress note in the medical record.  PMH is noted:    78F PMH of CAD s/p stent to LAD, AS s/p TAVR, PPM, DVT (Jan '18) on coumadin, IVC filter, pyoderma gangrenosum, hx MRSA infections, and total knee replacement c/b joint infections s/p I&D explantation and spacer placement followed by spacer removal and static spacer placement with joint fusion due to infection of initial spacer with complex wound closure by plastic surgery, admission here in November 2018 for hip and thigh wounds, now presenting with confusion and chills at home associated with poor appetite and confusion x 1 week and not eating x 2 days. Patient confused per aide, not remembering things she normally would. Has refused to eat food for two days citing poor appetite and has had poor mood though no suicidality. Patient with chronic pains of wounds of her hip/thigh as well as chronic R knee pain due to surgeries as above. There is some erythema surrounding the right knee that aide believes is somewhat worse than baseline over last 2-3 days but no exudates. Other wounds have been healing well per aide and she has wound vac on R hip and dressings in place on left thigh. Patient has however also complained of chills at home. Denies any chest pains at this time, has not complained of this at home per aide.  Denies SOB at this time.

## 2019-03-05 NOTE — PROGRESS NOTE ADULT - SUBJECTIVE AND OBJECTIVE BOX
Patient is a 78y old  Female who presents with a chief complaint of Cellulitis (05 Mar 2019 12:35)      SUBJECTIVE / OVERNIGHT EVENTS:  awake.  pain recurrs with dec dose of gabapentin.  eating better.  no cp, no sob.  extremities still edematous.       Vital Signs Last 24 Hrs  T(C): 37.4 (05 Mar 2019 11:23), Max: 37.4 (05 Mar 2019 11:23)  T(F): 99.3 (05 Mar 2019 11:23), Max: 99.3 (05 Mar 2019 11:23)  HR: 104 (05 Mar 2019 11:23) (98 - 104)  BP: 100/66 (05 Mar 2019 11:23) (100/66 - 126/72)  BP(mean): --  RR: 18 (05 Mar 2019 11:23) (18 - 18)  SpO2: 94% (05 Mar 2019 11:23) (93% - 94%)  I&O's Summary    04 Mar 2019 07:01  -  05 Mar 2019 07:00  --------------------------------------------------------  IN: 850 mL / OUT: 550 mL / NET: 300 mL    05 Mar 2019 07:01  -  05 Mar 2019 14:15  --------------------------------------------------------  IN: 280 mL / OUT: 600 mL / NET: -320 mL      PHYSICAL EXAM:  GENERAL: NAD, lying in bed, awake alert, morbidly obese  HEAD:  Atraumatic, Normocephalic  EYES: EOMI, PERRLA, conjunctiva and sclera clear  NECK: Supple, No JVD  CHEST/LUNG: mild decrease breath sounds bilaterally; No wheeze   HEART: Regular rate and rhythm; No murmurs, rubs, or gallops  ABDOMEN: Soft, Nontender, Nondistended; Bowel sounds present  Neuro: AAO x 2, no focal deficit  EXTREMITIES:  2+ Peripheral Pulses, No clubbing, cyanosis.  2+ nonpitting edema LE, UE 2-3+ edema  Back: sacral and thigh wound. +wound vac in place  SKIN: No rashes or lesions      LABS:    03-05    146<H>  |  108  |  40<H>  ----------------------------<  137<H>  4.5   |  24  |  1.58<H>    Ca    7.9<L>      05 Mar 2019 11:04        CAPILLARY BLOOD GLUCOSE                RADIOLOGY & ADDITIONAL TESTS:    Imaging Personally Reviewed:  [x] YES  [ ] NO    Consultant(s) Notes Reviewed:  [x] YES  [ ] NO      MEDICATIONS  (STANDING):  ascorbic acid 500 milliGRAM(s) Oral daily  aspirin enteric coated 81 milliGRAM(s) Oral daily  atorvastatin 10 milliGRAM(s) Oral at bedtime  bisacodyl 5 milliGRAM(s) Oral at bedtime  calcium carbonate 1250 mG  + Vitamin D (OsCal 500 + D) 1 Tablet(s) Oral three times a day  cholecalciferol 1000 Unit(s) Oral daily  cycloSPORINE  (SandIMMUNE)  Solution 100 milliGRAM(s) Oral two times a day  escitalopram 5 milliGRAM(s) Oral daily  folic acid 1 milliGRAM(s) Oral daily  furosemide   Injectable 20 milliGRAM(s) IV Push two times a day  gabapentin 100 milliGRAM(s) Oral two times a day  heparin  Injectable 5000 Unit(s) SubCutaneous every 8 hours  melatonin 3 milliGRAM(s) Oral at bedtime  metoprolol tartrate 12.5 milliGRAM(s) Oral two times a day  nystatin    Suspension 427621 Unit(s) Swish and Swallow three times a day  polyethylene glycol 3350 17 Gram(s) Oral daily  vancomycin  IVPB 1000 milliGRAM(s) IV Intermittent every 48 hours  vitamin B complex with vitamin C 1 Tablet(s) Oral daily    MEDICATIONS  (PRN):  acetaminophen   Tablet .. 650 milliGRAM(s) Oral every 6 hours PRN Mild Pain (1 - 3)  oxyCODONE    5 mG/acetaminophen 325 mG 1 Tablet(s) Oral every 4 hours PRN Severe Pain (7 - 10)      Care Discussed with Consultants/Other Providers [x] YES  [ ] NO    HEALTH ISSUES - PROBLEM Dx:  Hyperkalemia: Hyperkalemia  Acute kidney injury superimposed on CKD: Acute kidney injury superimposed on CKD  Acute kidney failure: Acute kidney failure  Pyoderma gangrenosum: Pyoderma gangrenosum  Depression, unspecified depression type: Depression, unspecified depression type  Medication management: Medication management  Gastroesophageal reflux disease, esophagitis presence not specified: Gastroesophageal reflux disease, esophagitis presence not specified  Aortic stenosis, severe: Aortic stenosis, severe  Coronary artery disease involving native coronary artery of native heart without angina pectoris: Coronary artery disease involving native coronary artery of native heart without angina pectoris  Altered mental status, unspecified altered mental status type: Altered mental status, unspecified altered mental status type  Cellulitis of leg, right: Cellulitis of leg, right

## 2019-03-05 NOTE — ADVANCED PRACTICE NURSE CONSULT - ASSESSMENT
Pt with multiple wounds on the sacrum, b/l thighs, and back. Pt with a hx of Pyoderma Gangrenosum. See PT Wound and Wound Team progress note for recommendations.  The pt is on a low air-loss surface being turned and positioned, staff have applied z-eleni boots to off-load the heels.  the pt is being followed by nutrition.  On the right heel is a wound with a mixed  tissue morphology: 75% of the wound presents as a dark, drying, flattening bulla and 25% presents as deep purple intact tissue. will classify as an evolving deep tissue injury. There is no drainage at this time as the skin is intact; the purple area is non-blanchable.  the wound measures 5cmx 3cm x0cm. the periwound skin is intact. The DP pulses are +  Will recommend to continue to monitor and to apply Cavilon daily. continue with z-eleni boots for off-loading.

## 2019-03-05 NOTE — PROGRESS NOTE ADULT - ASSESSMENT
A/P  78 F PMH of CAD s/p stent to LAD, AS s/p TAVR, PPM, DVT (Jan '18) on coumadin, IVC filter, pyoderma gangrenosum, hx MRSA infections, and total knee replacement c/b joint infections s/p I&D explantation and spacer placement followed by spacer removal and static spacer placement with joint fusion due to infection of initial spacer with complex wound closure by plastic surgery, admission here in November 2018 for hip and thigh wounds, now presenting with confusion and chills at home associated with poor appetite and confusion x 1 week and not eating x 2 days. Noted with fever here, YONATAN on labwork. R knee evaluated by orthopedic surgery.	    PG- Bilateral hips/ thighs/ lateral buttocks- DAKINs dressings/ CAVILON ADVANCE  F/u as per Derm & ID   con't offloading  con't Nuturition  con't Pericare  Con't per Medicine  F/u as outpatient in [facility] [address] [facility] [phone]  D/w team and attending  Janki PATELC [id number]  I spent 25  minutes w/ this pt of which more than 50% of the time was spent counseling & coordinating care of this pt.

## 2019-03-05 NOTE — PROGRESS NOTE ADULT - SUBJECTIVE AND OBJECTIVE BOX
SUBJECTIVE: Pt seen, chart reviewed.  Pt premedicated  c/o pain, calmed w/ reassurance  (+) faint odor, excess drainage  No redness , warmth, f/c/s  new areas noted  Cyclosporin levels elevated- dose cut in half  D/w ID & Derm  Pt doesn't like to turn/ move  HHA at bedside notes they try their best to encourge her to allow RN/PCA to turn & position, but when she yells and screams DON'T touch me...    ROS skin see HPI  all other systems negative    aspirin enteric coated 81 milliGRAM(s) Oral daily  heparin  Injectable 5000 Unit(s) SubCutaneous every 8 hours  nystatin    Suspension 209531 Unit(s) Swish and Swallow three times a day  vancomycin  IVPB 1000 milliGRAM(s) IV Intermittent every 48 hours      Physical Exam:  Vital Signs Last 24 Hrs  T(C): 37.4 (05 Mar 2019 11:23), Max: 37.4 (05 Mar 2019 11:23)  T(F): 99.3 (05 Mar 2019 11:23), Max: 99.3 (05 Mar 2019 11:23)  HR: 104 (05 Mar 2019 11:23) (98 - 104)  BP: 100/66 (05 Mar 2019 11:23) (100/66 - 126/72)  BP(mean): --  RR: 18 (05 Mar 2019 11:23) (18 - 18)  SpO2: 94% (05 Mar 2019 11:23) (93% - 94%)  General Appearance:  NAD, A&Ox3, MO  Versa Care P500      Bilateral hips/ thighs / lateral buttocks  extensive superficial necrotic wounds/ soft eschars w/ denuded periwound skin  several moist denuded areas ? soft pale eschar vs soon to heal   multiple areas w/ irregular maroon pigmented flat lesions w/ nodules underneath   Fluorescent green/ grey drainage  faint malodor  (+)tenderness - premedication helps  No erythema, induration, fluctuance, increased warmth      LABS:    Complete Blood Count + Automated Diff in AM (03.03.19 @ 09:35)    WBC Count: 19.4 K/uL    RBC Count: 2.98 M/uL    Hemoglobin: 8.0 g/dL    Hematocrit: 25.3 %    Mean Cell Volume: 85.0 fl    Mean Cell Hemoglobin: 26.9 pg    Mean Cell Hemoglobin Conc: 31.6 gm/dL    Red Cell Distrib Width: 16.6 %    Platelet Count - Automated: 310 K/uL      Basic Metabolic Panel (03.05.19 @ 11:04)    Sodium, Serum: 146 mmol/L    Potassium, Serum: 4.5 mmol/L    Chloride, Serum: 108 mmol/L    Carbon Dioxide, Serum: 24 mmol/L    Anion Gap, Serum: 14 mmol/L    Blood Urea Nitrogen, Serum: 40 mg/dL    Creatinine, Serum: 1.58 mg/dL    Glucose, Serum: 137 mg/dL    Calcium, Total Serum: 7.9 mg/dL      Cyclosporine Level, Serum (03.04.19 @ 13:33)    Cyclosporine Level, Serum: 300: CYCLOSPORINE: Assay performed by Chemiluminescent Microparticle  Immunoassay (CMIA) on the Abbott  system.  All immunoassay tests  are subject to rare interferences from heterophile antibodies so that if  atypical or unexpected results are obtained the lab should be notified to  confirm this result by an alternative method before adjusting medication  dosage. The therapeutic range of 150-400 ng/mL is based on trough levels  of Cyclosporine but levels for clinical management will vary depending on  the organ transplanted, post-dose time of draw, length of time  post-transplant and the individual patient's metabolism. ng/mL        CULTURES:    Culture - Blood (02.28.19 @ 17:53)    Specimen Source: .Blood Blood    Culture Results:   No growth to date.

## 2019-03-05 NOTE — PROGRESS NOTE ADULT - PROBLEM SELECTOR PLAN 1
serum creatinine stable  cont lasix 20mg iv bid ,   pt with ckd 3- cr stable- needs IV abx for next 3 weeks- Given current co- morbidity and requiring of long standing abx- Okay from renal stand point to plce picc- aware of risk of stenosis- will monitor- if HD required will use opposite extremity  daily basic metabolic panel

## 2019-03-05 NOTE — PROVIDER CONTACT NOTE (CRITICAL VALUE NOTIFICATION) - RECOMMENDATIONS
spoke to Provider she will call pt.'s provider and possibly get medication discontinued
Continue to monitor

## 2019-03-05 NOTE — CHART NOTE - NSCHARTNOTEFT_GEN_A_CORE
OK to place PICC in this patient with low GFR, given the circumstance of highly unlikely to go on dialysis, and presence of multiple access veins should that ever be a necessity.

## 2019-03-05 NOTE — PROGRESS NOTE ADULT - ATTENDING COMMENTS
d/w ID Dr. Grubbs. PICC to be placed given midline may not be adequate for long term vanco.  d/c planning after PICC line and once her edema a little better.  D/w the  and he is agreeable with the plan. Consent in the chart.     All questions answered.     Sammy Mendosa will be covering for me starting 3/6/19. He can be reached at  if needed.     - Dr. DENISSE Palumbo ([facility])  - [phone]

## 2019-03-06 LAB
ANION GAP SERPL CALC-SCNC: 13 MMOL/L — SIGNIFICANT CHANGE UP (ref 5–17)
ANTIBODY ID 1_1: SIGNIFICANT CHANGE UP
ANTIBODY ID 1_2: SIGNIFICANT CHANGE UP
BUN SERPL-MCNC: 38 MG/DL — HIGH (ref 7–23)
CALCIUM SERPL-MCNC: 8.5 MG/DL — SIGNIFICANT CHANGE UP (ref 8.4–10.5)
CHLORIDE SERPL-SCNC: 108 MMOL/L — SIGNIFICANT CHANGE UP (ref 96–108)
CO2 SERPL-SCNC: 25 MMOL/L — SIGNIFICANT CHANGE UP (ref 22–31)
CREAT SERPL-MCNC: 1.65 MG/DL — HIGH (ref 0.5–1.3)
CYCLOSPORINE SER-MCNC: 348 NG/ML — SIGNIFICANT CHANGE UP (ref 150–400)
DAT POLY-SP REAG RBC QL: NEGATIVE — SIGNIFICANT CHANGE UP
GLUCOSE SERPL-MCNC: 113 MG/DL — HIGH (ref 70–99)
HCT VFR BLD CALC: 22.8 % — LOW (ref 34.5–45)
HGB BLD-MCNC: 7.3 G/DL — LOW (ref 11.5–15.5)
INR BLD: 1.54 RATIO — HIGH (ref 0.88–1.16)
MCHC RBC-ENTMCNC: 27.4 PG — SIGNIFICANT CHANGE UP (ref 27–34)
MCHC RBC-ENTMCNC: 32 GM/DL — SIGNIFICANT CHANGE UP (ref 32–36)
MCV RBC AUTO: 85.5 FL — SIGNIFICANT CHANGE UP (ref 80–100)
PLATELET # BLD AUTO: 313 K/UL — SIGNIFICANT CHANGE UP (ref 150–400)
POTASSIUM SERPL-MCNC: 4.6 MMOL/L — SIGNIFICANT CHANGE UP (ref 3.5–5.3)
POTASSIUM SERPL-SCNC: 4.6 MMOL/L — SIGNIFICANT CHANGE UP (ref 3.5–5.3)
PROTHROM AB SERPL-ACNC: 18 SEC — HIGH (ref 10–12.9)
RBC # BLD: 2.67 M/UL — LOW (ref 3.8–5.2)
RBC # FLD: 16.6 % — HIGH (ref 10.3–14.5)
SODIUM SERPL-SCNC: 146 MMOL/L — HIGH (ref 135–145)
WBC # BLD: 22 K/UL — HIGH (ref 3.8–10.5)
WBC # FLD AUTO: 22 K/UL — HIGH (ref 3.8–10.5)

## 2019-03-06 PROCEDURE — 71045 X-RAY EXAM CHEST 1 VIEW: CPT | Mod: 26

## 2019-03-06 PROCEDURE — 86077 PHYS BLOOD BANK SERV XMATCH: CPT

## 2019-03-06 RX ORDER — LANOLIN ALCOHOL/MO/W.PET/CERES
1 CREAM (GRAM) TOPICAL
Qty: 0 | Refills: 0 | COMMUNITY
Start: 2019-03-06

## 2019-03-06 RX ORDER — ESCITALOPRAM OXALATE 10 MG/1
1 TABLET, FILM COATED ORAL
Qty: 0 | Refills: 0 | COMMUNITY
Start: 2019-03-06

## 2019-03-06 RX ORDER — VANCOMYCIN HCL 1 G
1 VIAL (EA) INTRAVENOUS
Qty: 0 | Refills: 0 | COMMUNITY
Start: 2019-03-06

## 2019-03-06 RX ORDER — ACETAMINOPHEN 500 MG
2 TABLET ORAL
Qty: 0 | Refills: 0 | COMMUNITY
Start: 2019-03-06

## 2019-03-06 RX ORDER — POLYETHYLENE GLYCOL 3350 17 G/17G
17 POWDER, FOR SOLUTION ORAL
Qty: 0 | Refills: 0 | COMMUNITY
Start: 2019-03-06

## 2019-03-06 RX ORDER — LANOLIN ALCOHOL/MO/W.PET/CERES
3 CREAM (GRAM) TOPICAL AT BEDTIME
Qty: 0 | Refills: 0 | Status: DISCONTINUED | OUTPATIENT
Start: 2019-03-06 | End: 2019-03-25

## 2019-03-06 RX ORDER — CHOLECALCIFEROL (VITAMIN D3) 125 MCG
1000 CAPSULE ORAL
Qty: 0 | Refills: 0 | COMMUNITY
Start: 2019-03-06

## 2019-03-06 RX ORDER — METOPROLOL TARTRATE 50 MG
12.5 TABLET ORAL
Qty: 0 | Refills: 0 | COMMUNITY
Start: 2019-03-06

## 2019-03-06 RX ORDER — METOPROLOL TARTRATE 50 MG
1 TABLET ORAL
Qty: 0 | Refills: 0 | COMMUNITY

## 2019-03-06 RX ADMIN — HEPARIN SODIUM 5000 UNIT(S): 5000 INJECTION INTRAVENOUS; SUBCUTANEOUS at 14:21

## 2019-03-06 RX ADMIN — ESCITALOPRAM OXALATE 5 MILLIGRAM(S): 10 TABLET, FILM COATED ORAL at 14:18

## 2019-03-06 RX ADMIN — Medication 650 MILLIGRAM(S): at 07:55

## 2019-03-06 RX ADMIN — Medication 1 TABLET(S): at 06:11

## 2019-03-06 RX ADMIN — Medication 650 MILLIGRAM(S): at 08:00

## 2019-03-06 RX ADMIN — OXYCODONE AND ACETAMINOPHEN 1 TABLET(S): 5; 325 TABLET ORAL at 15:52

## 2019-03-06 RX ADMIN — Medication 12.5 MILLIGRAM(S): at 18:58

## 2019-03-06 RX ADMIN — Medication 1 TABLET(S): at 14:20

## 2019-03-06 RX ADMIN — Medication 1 APPLICATION(S): at 22:18

## 2019-03-06 RX ADMIN — Medication 20 MILLIGRAM(S): at 18:58

## 2019-03-06 RX ADMIN — Medication 20 MILLIGRAM(S): at 06:11

## 2019-03-06 RX ADMIN — Medication 500000 UNIT(S): at 06:20

## 2019-03-06 RX ADMIN — ATORVASTATIN CALCIUM 10 MILLIGRAM(S): 80 TABLET, FILM COATED ORAL at 22:16

## 2019-03-06 RX ADMIN — Medication 81 MILLIGRAM(S): at 14:20

## 2019-03-06 RX ADMIN — Medication 1 TABLET(S): at 22:16

## 2019-03-06 RX ADMIN — Medication 250 MILLIGRAM(S): at 14:51

## 2019-03-06 RX ADMIN — GABAPENTIN 100 MILLIGRAM(S): 400 CAPSULE ORAL at 18:58

## 2019-03-06 RX ADMIN — Medication 1 MILLIGRAM(S): at 14:19

## 2019-03-06 RX ADMIN — HEPARIN SODIUM 5000 UNIT(S): 5000 INJECTION INTRAVENOUS; SUBCUTANEOUS at 22:16

## 2019-03-06 RX ADMIN — OXYCODONE AND ACETAMINOPHEN 1 TABLET(S): 5; 325 TABLET ORAL at 10:47

## 2019-03-06 RX ADMIN — Medication 500000 UNIT(S): at 22:16

## 2019-03-06 RX ADMIN — Medication 1000 UNIT(S): at 14:19

## 2019-03-06 RX ADMIN — CYCLOSPORINE 100 MILLIGRAM(S): 100 CAPSULE ORAL at 09:24

## 2019-03-06 RX ADMIN — CYCLOSPORINE 100 MILLIGRAM(S): 100 CAPSULE ORAL at 22:18

## 2019-03-06 RX ADMIN — OXYCODONE AND ACETAMINOPHEN 1 TABLET(S): 5; 325 TABLET ORAL at 16:22

## 2019-03-06 RX ADMIN — Medication 500 MILLIGRAM(S): at 14:20

## 2019-03-06 RX ADMIN — GABAPENTIN 100 MILLIGRAM(S): 400 CAPSULE ORAL at 06:11

## 2019-03-06 RX ADMIN — Medication 500000 UNIT(S): at 14:21

## 2019-03-06 RX ADMIN — Medication 5 MILLIGRAM(S): at 22:16

## 2019-03-06 RX ADMIN — Medication 1 APPLICATION(S): at 09:25

## 2019-03-06 RX ADMIN — OXYCODONE AND ACETAMINOPHEN 1 TABLET(S): 5; 325 TABLET ORAL at 10:17

## 2019-03-06 NOTE — PROGRESS NOTE ADULT - PROBLEM SELECTOR PLAN 1
serum creatinine stable  cont lasix 20mg iv bid ,   pt with ckd 3- cr stable-   s/p picc line for iv abx  avoid nephrotoxins  trend bmp

## 2019-03-06 NOTE — PROGRESS NOTE ADULT - PROBLEM SELECTOR PLAN 1
c/w vancomycin, q48hrs, renal dose. monitor vanc trough.   Total 4-6 weeks via PICC.  blood cultures from 2/28 are negative.  ID f/u appreciated  cyclosporine temporarily held on admission, but later restarted.   Cyclosporine dose adjusted to 100 mg BID based on levels  completed chronic steroids taper for pyoderma.   (she was on Prednisone 5 mg qdaily)  Per orthopedic surgery can be OOB primarily to wheelchair and can 50% weight bear on R leg if straight in immobilizer.  wound care consult and wound vac management appreciated.  LE edema likely due to IVF. On lasix 20 mg IV bid  TTE without discrete vegetation  ERIKA pending but likely will not change fact that she will need PICC for 4-6 week of antibx. will not do ERIKA given risk.   Plan for PICC line. renal clearance appreciated.

## 2019-03-06 NOTE — PROGRESS NOTE ADULT - SUBJECTIVE AND OBJECTIVE BOX
CC: f/u for coag negative staph bacteremia    Patient reports: my skin hurts all over    REVIEW OF SYSTEMS:  All other review of systems negative (Comprehensive ROS)    Antimicrobials Day #  :day 21  nystatin    Suspension 742086 Unit(s) Swish and Swallow three times a day  vancomycin  IVPB 1000 milliGRAM(s) IV Intermittent every 48 hours    Other Medications Reviewed  MEDICATIONS  (STANDING):  ascorbic acid 500 milliGRAM(s) Oral daily  aspirin enteric coated 81 milliGRAM(s) Oral daily  atorvastatin 10 milliGRAM(s) Oral at bedtime  bisacodyl 5 milliGRAM(s) Oral at bedtime  calcium carbonate 1250 mG  + Vitamin D (OsCal 500 + D) 1 Tablet(s) Oral three times a day  cholecalciferol 1000 Unit(s) Oral daily  cycloSPORINE  (SandIMMUNE)  Solution 100 milliGRAM(s) Oral two times a day  Dakins Solution - 1/4 Strength 1 Application(s) Topical two times a day  escitalopram 5 milliGRAM(s) Oral daily  folic acid 1 milliGRAM(s) Oral daily  furosemide   Injectable 20 milliGRAM(s) IV Push two times a day  gabapentin 100 milliGRAM(s) Oral two times a day  heparin  Injectable 5000 Unit(s) SubCutaneous every 8 hours  metoprolol tartrate 12.5 milliGRAM(s) Oral two times a day  nystatin    Suspension 667158 Unit(s) Swish and Swallow three times a day  polyethylene glycol 3350 17 Gram(s) Oral daily  vancomycin  IVPB 1000 milliGRAM(s) IV Intermittent every 48 hours  vitamin B complex with vitamin C 1 Tablet(s) Oral daily    T(F): 98.5 (03-06-19 @ 00:36), Max: 99.3 (03-05-19 @ 11:23)  HR: 104 (03-06-19 @ 06:06)  BP: 112/70 (03-06-19 @ 06:06)  RR: 18 (03-06-19 @ 06:06)  SpO2: 99% (03-06-19 @ 06:06)  Wt(kg): --    PHYSICAL EXAM:  General: alert, no acute distress,   Eyes:  anicteric, no conjunctival injection, no discharge  Oropharynx: no lesions or injection 	  Neck: supple, without adenopathy  Lungs: clear to auscultation  Heart: regular rate and rhythm; no murmur, rubs or gallops  Abdomen: soft, nondistended, nontender, without mass or organomegaly  Skin: multiple thigh wounds dressed  Extremities: no clubbing, cyanosis, + edema  Neurologic: alert, oriented, moves all extremities  RT knee without drainage or swelling  LAB RESULTS:                        7.3    22.0  )-----------( 313      ( 06 Mar 2019 09:52 )             22.8     03-06    146<H>  |  108  |  38<H>  ----------------------------<  113<H>  4.6   |  25  |  1.65<H>    Ca    8.5      06 Mar 2019 09:52          MICROBIOLOGY:  RECENT CULTURES:      RADIOLOGY REVIEWED:    < from:  Kidney and Bladder (02.27.19 @ 09:14) >  Urinary bladder: Bladder decompressed by indwelling Navarrete catheter.    IMPRESSION: Study somewhat limited as patient refused additional scanning.    Bilateral pleural effusions    No hydronephrosis. Upper pole left renal cyst. Area of heterogeneity   lower pole left kidney seen on CT examination 11/12/2018 is not   appreciated on this study.    < end of copied text >

## 2019-03-06 NOTE — PROGRESS NOTE ADULT - SUBJECTIVE AND OBJECTIVE BOX
Patient seen and examined  no complaints    amoxicillin (Other)  IV Contrast (Flushing (Skin); Nausea)    Hospital Medications:   MEDICATIONS  (STANDING):  ascorbic acid 500 milliGRAM(s) Oral daily  aspirin enteric coated 81 milliGRAM(s) Oral daily  atorvastatin 10 milliGRAM(s) Oral at bedtime  bisacodyl 5 milliGRAM(s) Oral at bedtime  calcium carbonate 1250 mG  + Vitamin D (OsCal 500 + D) 1 Tablet(s) Oral three times a day  cholecalciferol 1000 Unit(s) Oral daily  cycloSPORINE  (SandIMMUNE)  Solution 100 milliGRAM(s) Oral two times a day  Dakins Solution - 1/4 Strength 1 Application(s) Topical two times a day  escitalopram 5 milliGRAM(s) Oral daily  folic acid 1 milliGRAM(s) Oral daily  furosemide   Injectable 20 milliGRAM(s) IV Push two times a day  gabapentin 100 milliGRAM(s) Oral two times a day  heparin  Injectable 5000 Unit(s) SubCutaneous every 8 hours  metoprolol tartrate 12.5 milliGRAM(s) Oral two times a day  nystatin    Suspension 996094 Unit(s) Swish and Swallow three times a day  polyethylene glycol 3350 17 Gram(s) Oral daily  vancomycin  IVPB 1000 milliGRAM(s) IV Intermittent every 48 hours  vitamin B complex with vitamin C 1 Tablet(s) Oral daily        VITALS:  T(F): 98.2 (03-06-19 @ 11:29), Max: 99.1 (03-05-19 @ 17:48)  HR: 96 (03-06-19 @ 11:29)  BP: 103/68 (03-06-19 @ 11:29)  RR: 18 (03-06-19 @ 11:29)  SpO2: 98% (03-06-19 @ 11:29)  Wt(kg): --    03-05 @ 07:01  -  03-06 @ 07:00  --------------------------------------------------------  IN: 520 mL / OUT: 850 mL / NET: -330 mL    03-06 @ 07:01  -  03-06 @ 13:24  --------------------------------------------------------  IN: 220 mL / OUT: 0 mL / NET: 220 mL      Height (cm): 172.72 (03-06 @ 09:45)  Weight (kg): 120.6 (03-06 @ 09:45)  BMI (kg/m2): 40.4 (03-06 @ 09:45)  BSA (m2): 2.31 (03-06 @ 09:45)  Physical Exam :-  Constitutional: NAD  Neck: Supple.  Respiratory: Bilateral equal breath sounds,  Cardiovascular: S1, S2 normal,  Gastrointestinal: Bowel Sounds present, soft, non tender.  Extremities: Anasarca ++  Neurological: Alert and Oriented x 1, no focal deficits  Psychiatric: Normal mood, normal affect    LABS:  03-06    146<H>  |  108  |  38<H>  ----------------------------<  113<H>  4.6   |  25  |  1.65<H>    Ca    8.5      06 Mar 2019 09:52      Creatinine Trend: 1.65 <--, 1.58 <--, 1.58 <--, 1.65 <--, 1.65 <--, 1.77 <--, 1.82 <--                        7.3    22.0  )-----------( 313      ( 06 Mar 2019 09:52 )             22.8     Urine Studies:      RADIOLOGY & ADDITIONAL STUDIES:

## 2019-03-06 NOTE — PROGRESS NOTE ADULT - SUBJECTIVE AND OBJECTIVE BOX
Awake, alert, lying in bed waiting for pain medicine    Vital Signs Last 24 Hrs  T(C): 36.9 (06 Mar 2019 00:36), Max: 37.4 (05 Mar 2019 11:23)  T(F): 98.5 (06 Mar 2019 00:36), Max: 99.3 (05 Mar 2019 11:23)  HR: 104 (06 Mar 2019 06:06) (102 - 105)  BP: 112/70 (06 Mar 2019 06:06) (100/66 - 133/79)  BP(mean): --  RR: 18 (06 Mar 2019 06:06) (17 - 18)  SpO2: 99% (06 Mar 2019 06:06) (94% - 100%)    GENERAL: NAD, lying in bed, awake alert, morbidly obese  HEAD:  Atraumatic, Normocephalic  EYES: EOMI, PERRLA, conjunctiva and sclera clear  NECK: Supple, No JVD  CHEST/LUNG: mild decrease breath sounds bilaterally; No wheeze   HEART: Regular rate and rhythm; No murmurs, rubs, or gallops  ABDOMEN: Soft, Nontender, Nondistended; Bowel sounds present  Neuro: AAO x 2, no focal deficit  EXTREMITIES:  2+ Peripheral Pulses, No clubbing, cyanosis.  2+ nonpitting edema LE, UE 2-3+ edema  Back: sacral and thigh wound. +wound vac in place  SKIN: No rashes or lesions    LABS:                        7.3    22.0  )-----------( 313      ( 06 Mar 2019 09:52 )             22.8     03-06    146<H>  |  108  |  38<H>  ----------------------------<  113<H>  4.6   |  25  |  1.65<H>    Ca    8.5      06 Mar 2019 09:52      PT/INR - ( 06 Mar 2019 09:52 )   PT: 18.0 sec;   INR: 1.54 ratio           CAPILLARY BLOOD GLUCOSE

## 2019-03-06 NOTE — PROGRESS NOTE ADULT - ASSESSMENT
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  F/u blood cultures 2/14 are negative  New L LE DVT noted (IVC filter already in place)  leukocytosis not moderating, indeed it has been slowly increasing  Suggest:  Continue Vanco 1 g q 48, f/u level  anticipate 4-6 wk course via PICC  Follow temps and CBC/diff   Local wound care  if fever persistent leukocytosis will repeat blood cultures  Perhaps increased wound breakdown accounting for leukocytosis  May need steroids if wounds are worsening on cyclosporine  [place] guarded

## 2019-03-07 DIAGNOSIS — D50.9 IRON DEFICIENCY ANEMIA, UNSPECIFIED: ICD-10-CM

## 2019-03-07 LAB
ANION GAP SERPL CALC-SCNC: 11 MMOL/L — SIGNIFICANT CHANGE UP (ref 5–17)
BUN SERPL-MCNC: 43 MG/DL — HIGH (ref 7–23)
CALCIUM SERPL-MCNC: 8.4 MG/DL — SIGNIFICANT CHANGE UP (ref 8.4–10.5)
CHLORIDE SERPL-SCNC: 105 MMOL/L — SIGNIFICANT CHANGE UP (ref 96–108)
CO2 SERPL-SCNC: 25 MMOL/L — SIGNIFICANT CHANGE UP (ref 22–31)
CREAT SERPL-MCNC: 1.96 MG/DL — HIGH (ref 0.5–1.3)
CYCLOSPORINE SER-MCNC: 324 NG/ML — SIGNIFICANT CHANGE UP (ref 150–400)
GLUCOSE SERPL-MCNC: 86 MG/DL — SIGNIFICANT CHANGE UP (ref 70–99)
HCT VFR BLD CALC: 22.6 % — LOW (ref 34.5–45)
HCT VFR BLD CALC: 22.8 % — LOW (ref 34.5–45)
HGB BLD-MCNC: 6.4 G/DL — CRITICAL LOW (ref 11.5–15.5)
HGB BLD-MCNC: 6.9 G/DL — CRITICAL LOW (ref 11.5–15.5)
MCHC RBC-ENTMCNC: 25.7 PG — LOW (ref 27–34)
MCHC RBC-ENTMCNC: 26.2 PG — LOW (ref 27–34)
MCHC RBC-ENTMCNC: 28.3 GM/DL — LOW (ref 32–36)
MCHC RBC-ENTMCNC: 30.3 GM/DL — LOW (ref 32–36)
MCV RBC AUTO: 86.5 FL — SIGNIFICANT CHANGE UP (ref 80–100)
MCV RBC AUTO: 90.8 FL — SIGNIFICANT CHANGE UP (ref 80–100)
PLATELET # BLD AUTO: 305 K/UL — SIGNIFICANT CHANGE UP (ref 150–400)
PLATELET # BLD AUTO: 314 K/UL — SIGNIFICANT CHANGE UP (ref 150–400)
POTASSIUM SERPL-MCNC: 4.5 MMOL/L — SIGNIFICANT CHANGE UP (ref 3.5–5.3)
POTASSIUM SERPL-SCNC: 4.5 MMOL/L — SIGNIFICANT CHANGE UP (ref 3.5–5.3)
RBC # BLD: 2.49 M/UL — LOW (ref 3.8–5.2)
RBC # BLD: 2.64 M/UL — LOW (ref 3.8–5.2)
RBC # FLD: 16.9 % — HIGH (ref 10.3–14.5)
RBC # FLD: 17.5 % — HIGH (ref 10.3–14.5)
SODIUM SERPL-SCNC: 141 MMOL/L — SIGNIFICANT CHANGE UP (ref 135–145)
WBC # BLD: 19.53 K/UL — HIGH (ref 3.8–10.5)
WBC # BLD: 22.7 K/UL — HIGH (ref 3.8–10.5)
WBC # FLD AUTO: 19.53 K/UL — HIGH (ref 3.8–10.5)
WBC # FLD AUTO: 22.7 K/UL — HIGH (ref 3.8–10.5)

## 2019-03-07 PROCEDURE — 99232 SBSQ HOSP IP/OBS MODERATE 35: CPT

## 2019-03-07 RX ORDER — DAPTOMYCIN 500 MG/10ML
700 INJECTION, POWDER, LYOPHILIZED, FOR SOLUTION INTRAVENOUS EVERY 24 HOURS
Qty: 0 | Refills: 0 | Status: DISCONTINUED | OUTPATIENT
Start: 2019-03-07 | End: 2019-03-16

## 2019-03-07 RX ORDER — IRON SUCROSE 20 MG/ML
200 INJECTION, SOLUTION INTRAVENOUS ONCE
Qty: 0 | Refills: 0 | Status: COMPLETED | OUTPATIENT
Start: 2019-03-07 | End: 2019-03-07

## 2019-03-07 RX ADMIN — Medication 1 TABLET(S): at 21:42

## 2019-03-07 RX ADMIN — HEPARIN SODIUM 5000 UNIT(S): 5000 INJECTION INTRAVENOUS; SUBCUTANEOUS at 16:58

## 2019-03-07 RX ADMIN — Medication 1 MILLIGRAM(S): at 16:58

## 2019-03-07 RX ADMIN — OXYCODONE AND ACETAMINOPHEN 1 TABLET(S): 5; 325 TABLET ORAL at 12:06

## 2019-03-07 RX ADMIN — Medication 500000 UNIT(S): at 06:00

## 2019-03-07 RX ADMIN — Medication 1000 UNIT(S): at 16:57

## 2019-03-07 RX ADMIN — HEPARIN SODIUM 5000 UNIT(S): 5000 INJECTION INTRAVENOUS; SUBCUTANEOUS at 06:00

## 2019-03-07 RX ADMIN — HEPARIN SODIUM 5000 UNIT(S): 5000 INJECTION INTRAVENOUS; SUBCUTANEOUS at 21:42

## 2019-03-07 RX ADMIN — Medication 1 APPLICATION(S): at 20:14

## 2019-03-07 RX ADMIN — OXYCODONE AND ACETAMINOPHEN 1 TABLET(S): 5; 325 TABLET ORAL at 12:36

## 2019-03-07 RX ADMIN — Medication 650 MILLIGRAM(S): at 21:12

## 2019-03-07 RX ADMIN — POLYETHYLENE GLYCOL 3350 17 GRAM(S): 17 POWDER, FOR SOLUTION ORAL at 12:18

## 2019-03-07 RX ADMIN — ATORVASTATIN CALCIUM 10 MILLIGRAM(S): 80 TABLET, FILM COATED ORAL at 21:42

## 2019-03-07 RX ADMIN — Medication 650 MILLIGRAM(S): at 19:02

## 2019-03-07 RX ADMIN — Medication 500 MILLIGRAM(S): at 16:58

## 2019-03-07 RX ADMIN — Medication 1 TABLET(S): at 16:57

## 2019-03-07 RX ADMIN — ESCITALOPRAM OXALATE 5 MILLIGRAM(S): 10 TABLET, FILM COATED ORAL at 16:57

## 2019-03-07 RX ADMIN — Medication 20 MILLIGRAM(S): at 05:59

## 2019-03-07 RX ADMIN — Medication 81 MILLIGRAM(S): at 16:57

## 2019-03-07 RX ADMIN — Medication 1 APPLICATION(S): at 07:49

## 2019-03-07 RX ADMIN — GABAPENTIN 100 MILLIGRAM(S): 400 CAPSULE ORAL at 16:58

## 2019-03-07 RX ADMIN — GABAPENTIN 100 MILLIGRAM(S): 400 CAPSULE ORAL at 05:59

## 2019-03-07 RX ADMIN — Medication 1 TABLET(S): at 05:59

## 2019-03-07 RX ADMIN — Medication 500000 UNIT(S): at 21:42

## 2019-03-07 RX ADMIN — CYCLOSPORINE 100 MILLIGRAM(S): 100 CAPSULE ORAL at 08:30

## 2019-03-07 RX ADMIN — IRON SUCROSE 110 MILLIGRAM(S): 20 INJECTION, SOLUTION INTRAVENOUS at 20:27

## 2019-03-07 RX ADMIN — Medication 1 TABLET(S): at 16:58

## 2019-03-07 RX ADMIN — Medication 5 MILLIGRAM(S): at 21:42

## 2019-03-07 RX ADMIN — Medication 500000 UNIT(S): at 16:58

## 2019-03-07 RX ADMIN — DAPTOMYCIN 128 MILLIGRAM(S): 500 INJECTION, POWDER, LYOPHILIZED, FOR SOLUTION INTRAVENOUS at 19:01

## 2019-03-07 RX ADMIN — CYCLOSPORINE 100 MILLIGRAM(S): 100 CAPSULE ORAL at 21:43

## 2019-03-07 NOTE — PROGRESS NOTE ADULT - SUBJECTIVE AND OBJECTIVE BOX
[facility] - KIRTI Castillo / KIRTI Gracia / RITA Daniels/ KIRTI Zamudio/ KIRTI Irizarry/ LOLLY Hays / CLINT Forbes / LEBRON Boss  ---------------------------------------------------------------------------------------------------------------  seen and examined today for YONATAN  Interval : rising Serum creatinine   VITALS:  T(F): 99.6 (03-07-19 @ 07:51), Max: 99.6 (03-07-19 @ 07:51)  HR: 102 (03-07-19 @ 07:51)  BP: 102/67 (03-07-19 @ 07:51)  RR: 20 (03-07-19 @ 07:51)  SpO2: 99% (03-07-19 @ 07:51)  Wt(kg): --    03-06 @ 07:01  -  03-07 @ 07:00  --------------------------------------------------------  IN: 680 mL / OUT: 170 mL / NET: 510 mL    03-07 @ 07:01  -  03-07 @ 13:22  --------------------------------------------------------  IN: 220 mL / OUT: 0 mL / NET: 220 mL      Physical Exam :-  Constitutional: NAD  Neck: Supple.  Respiratory: Bilateral equal breath sounds,  Cardiovascular: S1, S2 normal,  Gastrointestinal: Bowel Sounds present, soft, non tender.  Extremities: Anasarca 2+  Neurological: AAOx1  Psychiatric: Normal mood, normal affect  Data:-  Allergies :   amoxicillin (Other)  IV Contrast (Flushing (Skin); Nausea)    Hospital Medications:   MEDICATIONS  (STANDING):  ascorbic acid 500 milliGRAM(s) Oral daily  aspirin enteric coated 81 milliGRAM(s) Oral daily  atorvastatin 10 milliGRAM(s) Oral at bedtime  bisacodyl 5 milliGRAM(s) Oral at bedtime  calcium carbonate 1250 mG  + Vitamin D (OsCal 500 + D) 1 Tablet(s) Oral three times a day  cholecalciferol 1000 Unit(s) Oral daily  cycloSPORINE  (SandIMMUNE)  Solution 100 milliGRAM(s) Oral two times a day  Dakins Solution - 1/4 Strength 1 Application(s) Topical two times a day  escitalopram 5 milliGRAM(s) Oral daily  folic acid 1 milliGRAM(s) Oral daily  furosemide   Injectable 20 milliGRAM(s) IV Push two times a day  gabapentin 100 milliGRAM(s) Oral two times a day  heparin  Injectable 5000 Unit(s) SubCutaneous every 8 hours  iron sucrose IVPB 200 milliGRAM(s) IV Intermittent once  metoprolol tartrate 12.5 milliGRAM(s) Oral two times a day  nystatin    Suspension 425211 Unit(s) Swish and Swallow three times a day  polyethylene glycol 3350 17 Gram(s) Oral daily  vancomycin  IVPB 1000 milliGRAM(s) IV Intermittent every 48 hours  vitamin B complex with vitamin C 1 Tablet(s) Oral daily    03-07    141  |  105  |  43<H>  ----------------------------<  86  4.5   |  25  |  1.96<H>    Ca    8.4      07 Mar 2019 07:01      Creatinine Trend: 1.96 <--, 1.65 <--, 1.58 <--, 1.58 <--, 1.65 <--, 1.65 <--, 1.77 <--                        6.4    19.53 )-----------( 305      ( 07 Mar 2019 09:36 )             22.6

## 2019-03-07 NOTE — PROVIDER CONTACT NOTE (OTHER) - RECOMMENDATIONS
I bladder scanned pt., only 131 in bladder. Pt. is not drinking well, appetite poor, low grade temperature
RN attempted 3x to get Pt to take med. RN spaced timing out to allow Pt to calm down in between. Pt refusing all PO and not giving reason. RN educated Pt on importance of taking medications

## 2019-03-07 NOTE — PROGRESS NOTE ADULT - ASSESSMENT
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  F/u blood cultures 2/14 are negative  New L LE DVT noted (IVC filter already in place)  leukocytosis not moderating, indeed it has been slowly increasing, it may be in part reactive, as she is anemic.  CXR reviewed, nodular opacities seen, favor CT as she could have embolized to lung from RT sided endocarditis  Her creatinine has been slowly creeping up, will take this as a good opportunity  to switch to daptomycin, less of a nephrotoxic agent  Suggest:  will switch to dapto 6mg/kg q48  anticipate a 6  wk course via PICC, day 22/42   Local wound care  Low threshold to repeat blood cultures  Perhaps increased wound breakdown accounting for leukocytosis  May need steroids if wounds are worsening on cyclosporine  Favor chest CT, if embolic lesions seen this would point to RT sided endocarditis of device lead infection 77 yo F hx PMR, Pyoderma Gangrenosum (prior steroids, now on cyclosporine), s/p TAVR and explant of infected R Knee for MRSA (doxy suppression, spacer) admitted with fever to 102 and leukocytosis- S epi in 3 of 4 Bld Cxs  Prior S epi bacteremia Sept '18  Wounds as per wound care service, still an active issues  No obvious primary source for S epi at present- raises concern of either heart valve infection or PPM lead infection- ?seeding from prior episode  Her latest isolate is resistant to beta lactams and most second line agents, Vanco EDWARD 4.  F/u blood cultures 2/14 are negative  New L LE DVT noted (IVC filter already in place)  leukocytosis not moderating, indeed it has been slowly increasing, it may be in part reactive, as she is anemic.  CXR reviewed, nodular opacities seen, favor CT as she could have embolized to lung from RT sided endocarditis  Her creatinine has been slowly creeping up, will take this as a good opportunity  to switch to daptomycin, less of a nephrotoxic agent  Suggest:  will switch to dapto 6mg/kg 24  anticipate a 6  wk course via PICC, day 22/42   Local wound care  Low threshold to repeat blood cultures  Perhaps increased wound breakdown accounting for leukocytosis  May need steroids if wounds are worsening on cyclosporine  Favor chest CT, if embolic lesions seen this would point to RT sided endocarditis of device lead infection

## 2019-03-07 NOTE — PROGRESS NOTE ADULT - PROBLEM SELECTOR PLAN 1
noted rise in serum creatinine  suspected that it's in relation to drop in H/H  transfuse as needed  hold diuretics today  please draw cyclosporie level 1hrs before AM PO dose or around that time, all levels are observed to be mid-day and not useful for assessment of cyclo level  s/p picc line for iv abx  avoid nephrotoxins  trend bmp

## 2019-03-07 NOTE — PROGRESS NOTE ADULT - SUBJECTIVE AND OBJECTIVE BOX
No acute SOB or CP  No fevers last night    Vital Signs Last 24 Hrs  T(C): 37.6 (07 Mar 2019 07:51), Max: 37.6 (07 Mar 2019 07:51)  T(F): 99.6 (07 Mar 2019 07:51), Max: 99.6 (07 Mar 2019 07:51)  HR: 102 (07 Mar 2019 07:51) (94 - 107)  BP: 102/67 (07 Mar 2019 07:51) (102/67 - 121/70)  BP(mean): --  RR: 20 (07 Mar 2019 07:51) (18 - 20)  SpO2: 99% (07 Mar 2019 07:51) (98% - 99%)    GENERAL: NAD, lying in bed, awake alert, morbidly obese  HEAD:  Atraumatic, Normocephalic  EYES: EOMI, PERRLA, conjunctiva and sclera clear  NECK: Supple, No JVD  CHEST/LUNG: mild decrease breath sounds bilaterally; No wheeze   HEART: Regular rate and rhythm; No murmurs, rubs, or gallops  ABDOMEN: Soft, Nontender, Nondistended; Bowel sounds present  Neuro: AAO x 2, no focal deficit  EXTREMITIES:  2+ Peripheral Pulses, No clubbing, cyanosis.  2+ nonpitting edema LE, UE 2-3+ edema  Back: sacral and thigh wound. +wound vac in place  SKIN: No rashes or lesions    LABS:                        6.4    19.53 )-----------( 305      ( 07 Mar 2019 09:36 )             22.6     03-07    141  |  105  |  43<H>  ----------------------------<  86  4.5   |  25  |  1.96<H>    Ca    8.4      07 Mar 2019 07:01      PT/INR - ( 06 Mar 2019 09:52 )   PT: 18.0 sec;   INR: 1.54 ratio           CAPILLARY BLOOD GLUCOSE

## 2019-03-07 NOTE — PROGRESS NOTE ADULT - SUBJECTIVE AND OBJECTIVE BOX
CC: f/u for coag negative staph bacteremia    Patient reports: she feels weak, aide reports that she is not eating, spends most of day in bed.Voiding on her own, PICC line in right arm    REVIEW OF SYSTEMS:  All other review of systems negative (Comprehensive ROS)    Antimicrobials Day #  :day 22/42  nystatin    Suspension 870802 Unit(s) Swish and Swallow three times a day  vancomycin  IVPB 1000 milliGRAM(s) IV Intermittent every 48 hours    Other Medications Reviewed    T(F): 99.6 (03-07-19 @ 07:51), Max: 99.6 (03-07-19 @ 07:51)  HR: 102 (03-07-19 @ 07:51)  BP: 102/67 (03-07-19 @ 07:51)  RR: 20 (03-07-19 @ 07:51)  SpO2: 99% (03-07-19 @ 07:51)  Wt(kg): --    PHYSICAL EXAM:  General: alert, no acute distress, debilitated  Eyes:  anicteric, no conjunctival injection, no discharge  Oropharynx: no lesions or injection 	  Neck: supple, without adenopathy  Lungs: diminished at bases  Heart: regular rate and rhythm; no murmur, rubs or gallops  Abdomen: soft, nondistended, nontender, without mass or organomegaly  Skin: no lesions  Extremities: no clubbing, cyanosis, + edema  Neurologic: alert, oriented, moves all extremities  Rt knee well healed, flank wounds dressed  LAB RESULTS:                        6.9    22.7  )-----------( 314      ( 07 Mar 2019 12:28 )             22.8     03-07    141  |  105  |  43<H>  ----------------------------<  86  4.5   |  25  |  1.96<H>    Ca    8.4      07 Mar 2019 07:01          MICROBIOLOGY:  RECENT CULTURES:      RADIOLOGY REVIEWED:  < from: Xray Chest 1 View- PORTABLE-Urgent (03.06.19 @ 13:29) >  IMPRESSION:    Nodular densities projecting over the right apex are new from 2/12/2019.   Recommend further evaluation with chest CT.

## 2019-03-07 NOTE — PROGRESS NOTE ADULT - SUBJECTIVE AND OBJECTIVE BOX
SUBJECTIVE: Pt seen, chart reviewed.  Blood cultures currently reported as negative  dressing wounds with Dakins  Cyclosporin dose based on current blood levels   YONATAN noted    Allergies    amoxicillin (Other)    Intolerances    IV Contrast (Flushing (Skin); Nausea)      aspirin enteric coated 81 milliGRAM(s) Oral daily  heparin  Injectable 5000 Unit(s) SubCutaneous every 8 hours  nystatin    Suspension [id number] Unit(s) Swish and Swallow three times a day  vancomycin  IVPB 1000 milliGRAM(s) IV Intermittent every 48 hours      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  Artificial cardiac pacemaker: 12/25/17  H/O cardiac catheterization: 11/29/16 HERBERT placed on LAD  H/O endoscopy: with dilatation of esophageal stricture 2013  S/P cataract surgery: x2; 3-4yr ago  S/P gastroplasty: 28 yrs ago  S/P cholecystectomy: more than 15 years ago  S/P total knee replacement: left, 15yr ago  S/P total knee replacement: right, 12yr ago  S/P hysterectomy: 24yr ago      HEALTH ISSUES - PROBLEM Dx:  Hyperkalemia: Hyperkalemia  Acute kidney injury superimposed on CKD: Acute kidney injury superimposed on CKD  Acute kidney failure: Acute kidney failure  Pyoderma gangrenosum: Pyoderma gangrenosum  Depression, unspecified depression type: Depression, unspecified depression type  Medication management: Medication management  Gastroesophageal reflux disease, esophagitis presence not specified: Gastroesophageal reflux disease, esophagitis presence not specified  Aortic stenosis, severe: Aortic stenosis, severe  Coronary artery disease involving native coronary artery of native heart without angina pectoris: Coronary artery disease involving native coronary artery of native heart without angina pectoris  Altered mental status, unspecified altered mental status type: Altered mental status, unspecified altered mental status type  Cellulitis of leg, right: Cellulitis of leg, right          FAMILY HISTORY:  No pertinent family history in first degree relatives      Physical Exam:  Vital Signs Last 24 Hrs  T(C): 37.6 (07 Mar 2019 07:51), Max: 37.6 (07 Mar 2019 07:51)  T(F): 99.6 (07 Mar 2019 07:51), Max: 99.6 (07 Mar 2019 07:51)  HR: 102 (07 Mar 2019 07:51) (94 - 107)  BP: 102/67 (07 Mar 2019 07:51) (102/67 - 121/70)  BP(mean): --  RR: 20 (07 Mar 2019 07:51) (18 - 20)  SpO2: 99% (07 Mar 2019 07:51) (98% - 99%)    Remains non ambulatory and is not OOB as per patient wishes  Mandel facies continues to resolve, off steroids  Using nasal O2 at bedrest  With maximal assistance , and with patient loudly protesting , all dressings changed, after turning onto right and left lateral decubitus positions  Wounds of bilateral lateral thighs are without odor, without green drainage currently  Scattered areas of superficial skin necrosis, without cellulitis  In left sided abdominal - flank skin fold, wound has evolved , and is now covered with eschar, no cellulitis  Wounds dressed with a combination of Honey and Aquacel    remains incontinent of stool   A bariatric bed has been requested    Remain available    LABS:                        6.4    19.53 )-----------( 305      ( 07 Mar 2019 09:36 )             22.6     PT/INR - ( 06 Mar 2019 09:52 )   PT: 18.0 sec;   INR: 1.54 ratio               Outpatient follow up information provided- [phone]

## 2019-03-08 LAB
ANION GAP SERPL CALC-SCNC: 12 MMOL/L — SIGNIFICANT CHANGE UP (ref 5–17)
ANISOCYTOSIS BLD QL: SLIGHT — SIGNIFICANT CHANGE UP
BASOPHILS # BLD AUTO: 0.02 K/UL — SIGNIFICANT CHANGE UP (ref 0–0.2)
BASOPHILS NFR BLD AUTO: 0.1 % — SIGNIFICANT CHANGE UP (ref 0–2)
BLD GP AB SCN SERPL QL: POSITIVE — SIGNIFICANT CHANGE UP
BUN SERPL-MCNC: 42 MG/DL — HIGH (ref 7–23)
CALCIUM SERPL-MCNC: 8.5 MG/DL — SIGNIFICANT CHANGE UP (ref 8.4–10.5)
CHLORIDE SERPL-SCNC: 109 MMOL/L — HIGH (ref 96–108)
CK SERPL-CCNC: 39 U/L — SIGNIFICANT CHANGE UP (ref 25–170)
CO2 SERPL-SCNC: 25 MMOL/L — SIGNIFICANT CHANGE UP (ref 22–31)
CREAT SERPL-MCNC: 2 MG/DL — HIGH (ref 0.5–1.3)
EOSINOPHIL # BLD AUTO: 0.16 K/UL — SIGNIFICANT CHANGE UP (ref 0–0.5)
EOSINOPHIL NFR BLD AUTO: 0.7 % — SIGNIFICANT CHANGE UP (ref 0–6)
GLUCOSE SERPL-MCNC: 103 MG/DL — HIGH (ref 70–99)
HCT VFR BLD CALC: 21.1 % — LOW (ref 34.5–45)
HCT VFR BLD CALC: 24.2 % — LOW (ref 34.5–45)
HGB BLD-MCNC: 6.1 G/DL — CRITICAL LOW (ref 11.5–15.5)
HGB BLD-MCNC: 7.8 G/DL — LOW (ref 11.5–15.5)
HYPOCHROMIA BLD QL: SLIGHT — SIGNIFICANT CHANGE UP
IMM GRANULOCYTES NFR BLD AUTO: 2.5 % — HIGH (ref 0–1.5)
LYMPHOCYTES # BLD AUTO: 2.14 K/UL — SIGNIFICANT CHANGE UP (ref 1–3.3)
LYMPHOCYTES # BLD AUTO: 9.9 % — LOW (ref 13–44)
MACROCYTES BLD QL: SLIGHT — SIGNIFICANT CHANGE UP
MANUAL SMEAR VERIFICATION: SIGNIFICANT CHANGE UP
MCHC RBC-ENTMCNC: 26.3 PG — LOW (ref 27–34)
MCHC RBC-ENTMCNC: 27.5 PG — SIGNIFICANT CHANGE UP (ref 27–34)
MCHC RBC-ENTMCNC: 28.9 GM/DL — LOW (ref 32–36)
MCHC RBC-ENTMCNC: 32.2 GM/DL — SIGNIFICANT CHANGE UP (ref 32–36)
MCV RBC AUTO: 85.4 FL — SIGNIFICANT CHANGE UP (ref 80–100)
MCV RBC AUTO: 90.9 FL — SIGNIFICANT CHANGE UP (ref 80–100)
MICROCYTES BLD QL: SLIGHT — SIGNIFICANT CHANGE UP
MONOCYTES # BLD AUTO: 0.8 K/UL — SIGNIFICANT CHANGE UP (ref 0–0.9)
MONOCYTES NFR BLD AUTO: 3.7 % — SIGNIFICANT CHANGE UP (ref 2–14)
NEUTROPHILS # BLD AUTO: 17.96 K/UL — HIGH (ref 1.8–7.4)
NEUTROPHILS NFR BLD AUTO: 83.1 % — HIGH (ref 43–77)
OVALOCYTES BLD QL SMEAR: SLIGHT — SIGNIFICANT CHANGE UP
PLAT MORPH BLD: NORMAL — SIGNIFICANT CHANGE UP
PLATELET # BLD AUTO: 287 K/UL — SIGNIFICANT CHANGE UP (ref 150–400)
PLATELET # BLD AUTO: 321 K/UL — SIGNIFICANT CHANGE UP (ref 150–400)
POIKILOCYTOSIS BLD QL AUTO: SLIGHT — SIGNIFICANT CHANGE UP
POLYCHROMASIA BLD QL SMEAR: SLIGHT — SIGNIFICANT CHANGE UP
POTASSIUM SERPL-MCNC: 4.6 MMOL/L — SIGNIFICANT CHANGE UP (ref 3.5–5.3)
POTASSIUM SERPL-SCNC: 4.6 MMOL/L — SIGNIFICANT CHANGE UP (ref 3.5–5.3)
RBC # BLD: 2.32 M/UL — LOW (ref 3.8–5.2)
RBC # BLD: 2.83 M/UL — LOW (ref 3.8–5.2)
RBC # FLD: 16.2 % — HIGH (ref 10.3–14.5)
RBC # FLD: 17.4 % — HIGH (ref 10.3–14.5)
RBC BLD AUTO: ABNORMAL
RH IG SCN BLD-IMP: POSITIVE — SIGNIFICANT CHANGE UP
SODIUM SERPL-SCNC: 146 MMOL/L — HIGH (ref 135–145)
STOMATOCYTES BLD QL SMEAR: SLIGHT — SIGNIFICANT CHANGE UP
WBC # BLD: 21.61 K/UL — HIGH (ref 3.8–10.5)
WBC # BLD: 23.7 K/UL — HIGH (ref 3.8–10.5)
WBC # FLD AUTO: 21.61 K/UL — HIGH (ref 3.8–10.5)
WBC # FLD AUTO: 23.7 K/UL — HIGH (ref 3.8–10.5)

## 2019-03-08 RX ORDER — CYCLOSPORINE 100 MG/1
75 CAPSULE ORAL
Qty: 0 | Refills: 0 | Status: DISCONTINUED | OUTPATIENT
Start: 2019-03-08 | End: 2019-03-25

## 2019-03-08 RX ORDER — IRON SUCROSE 20 MG/ML
200 INJECTION, SOLUTION INTRAVENOUS ONCE
Qty: 0 | Refills: 0 | Status: COMPLETED | OUTPATIENT
Start: 2019-03-08 | End: 2019-03-08

## 2019-03-08 RX ORDER — OXYCODONE AND ACETAMINOPHEN 5; 325 MG/1; MG/1
1 TABLET ORAL EVERY 6 HOURS
Qty: 0 | Refills: 0 | Status: DISCONTINUED | OUTPATIENT
Start: 2019-03-08 | End: 2019-03-16

## 2019-03-08 RX ADMIN — HEPARIN SODIUM 5000 UNIT(S): 5000 INJECTION INTRAVENOUS; SUBCUTANEOUS at 14:48

## 2019-03-08 RX ADMIN — Medication 1 MILLIGRAM(S): at 12:20

## 2019-03-08 RX ADMIN — CYCLOSPORINE 100 MILLIGRAM(S): 100 CAPSULE ORAL at 09:51

## 2019-03-08 RX ADMIN — Medication 1 APPLICATION(S): at 09:51

## 2019-03-08 RX ADMIN — Medication 1 TABLET(S): at 12:47

## 2019-03-08 RX ADMIN — Medication 1 TABLET(S): at 21:49

## 2019-03-08 RX ADMIN — GABAPENTIN 100 MILLIGRAM(S): 400 CAPSULE ORAL at 19:05

## 2019-03-08 RX ADMIN — Medication 500000 UNIT(S): at 21:49

## 2019-03-08 RX ADMIN — Medication 1 TABLET(S): at 12:22

## 2019-03-08 RX ADMIN — IRON SUCROSE 110 MILLIGRAM(S): 20 INJECTION, SOLUTION INTRAVENOUS at 21:51

## 2019-03-08 RX ADMIN — Medication 500000 UNIT(S): at 18:48

## 2019-03-08 RX ADMIN — Medication 650 MILLIGRAM(S): at 06:00

## 2019-03-08 RX ADMIN — GABAPENTIN 100 MILLIGRAM(S): 400 CAPSULE ORAL at 05:44

## 2019-03-08 RX ADMIN — Medication 12.5 MILLIGRAM(S): at 05:44

## 2019-03-08 RX ADMIN — Medication 1 APPLICATION(S): at 21:25

## 2019-03-08 RX ADMIN — ATORVASTATIN CALCIUM 10 MILLIGRAM(S): 80 TABLET, FILM COATED ORAL at 21:49

## 2019-03-08 RX ADMIN — Medication 1 TABLET(S): at 05:44

## 2019-03-08 RX ADMIN — Medication 1000 UNIT(S): at 12:21

## 2019-03-08 RX ADMIN — Medication 500000 UNIT(S): at 05:44

## 2019-03-08 RX ADMIN — POLYETHYLENE GLYCOL 3350 17 GRAM(S): 17 POWDER, FOR SOLUTION ORAL at 12:22

## 2019-03-08 RX ADMIN — Medication 500 MILLIGRAM(S): at 12:20

## 2019-03-08 RX ADMIN — HEPARIN SODIUM 5000 UNIT(S): 5000 INJECTION INTRAVENOUS; SUBCUTANEOUS at 21:49

## 2019-03-08 RX ADMIN — HEPARIN SODIUM 5000 UNIT(S): 5000 INJECTION INTRAVENOUS; SUBCUTANEOUS at 05:44

## 2019-03-08 RX ADMIN — DAPTOMYCIN 128 MILLIGRAM(S): 500 INJECTION, POWDER, LYOPHILIZED, FOR SOLUTION INTRAVENOUS at 20:51

## 2019-03-08 RX ADMIN — Medication 81 MILLIGRAM(S): at 12:20

## 2019-03-08 RX ADMIN — Medication 20 MILLIGRAM(S): at 05:44

## 2019-03-08 RX ADMIN — Medication 5 MILLIGRAM(S): at 21:49

## 2019-03-08 RX ADMIN — CYCLOSPORINE 75 MILLIGRAM(S): 100 CAPSULE ORAL at 22:41

## 2019-03-08 RX ADMIN — ESCITALOPRAM OXALATE 5 MILLIGRAM(S): 10 TABLET, FILM COATED ORAL at 12:20

## 2019-03-08 RX ADMIN — Medication 650 MILLIGRAM(S): at 05:44

## 2019-03-08 NOTE — PROGRESS NOTE ADULT - SUBJECTIVE AND OBJECTIVE BOX
No acute changes in her chronic overall state    Vital Signs Last 24 Hrs  T(C): 36.8 (08 Mar 2019 09:40), Max: 37.6 (07 Mar 2019 16:45)  T(F): 98.2 (08 Mar 2019 09:40), Max: 99.6 (07 Mar 2019 16:45)  HR: 90 (08 Mar 2019 09:40) (88 - 99)  BP: 98/61 (08 Mar 2019 09:40) (98/61 - 109/70)  BP(mean): --  RR: 18 (08 Mar 2019 09:40) (18 - 18)  SpO2: 95% (08 Mar 2019 09:40) (93% - 97%)      GENERAL: NAD, lying in bed, awake alert, morbidly obese  HEAD:  Atraumatic, Normocephalic  EYES: EOMI, PERRLA, conjunctiva and sclera clear  NECK: Supple, No JVD  CHEST/LUNG: mild decrease breath sounds bilaterally; No wheeze   HEART: Regular rate and rhythm; No murmurs, rubs, or gallops  ABDOMEN: Soft, Nontender, Nondistended; Bowel sounds present  Neuro: AAO x 2, no focal deficit  EXTREMITIES:  2+ Peripheral Pulses, No clubbing, cyanosis.  2+ nonpitting edema LE, UE 2-3+ edema  Back: sacral and thigh wound. +wound vac in place  SKIN: No rashes or lesions    LABS:                        6.1    21.61 )-----------( 287      ( 08 Mar 2019 09:25 )             21.1     03-08    146<H>  |  109<H>  |  42<H>  ----------------------------<  103<H>  4.6   |  25  |  2.00<H>    Ca    8.5      08 Mar 2019 05:22        CAPILLARY BLOOD GLUCOSE        CARDIAC MARKERS ( 08 Mar 2019 05:22 )  x     / x     / 39 U/L / x     / x

## 2019-03-08 NOTE — PROGRESS NOTE ADULT - SUBJECTIVE AND OBJECTIVE BOX
CC: f/u for coag negative staph bacteremia    Patient remains weak, poor appetite, generalized pain    REVIEW OF SYSTEMS:  All other review of systems negative (Comprehensive ROS)- limited    Antimicrobials Day # 23/42  Medications Reviewed    Vital Signs Last 24 Hrs  T(F): 98.2 (08 Mar 2019 09:40), Max: 99.6 (07 Mar 2019 16:45)  HR: 90 (08 Mar 2019 09:40) (88 - 99)  BP: 98/61 (08 Mar 2019 09:40) (98/61 - 109/70)  BP(mean): --  RR: 18 (08 Mar 2019 09:40) (18 - 18)  SpO2: 95% (08 Mar 2019 09:40) (93% - 97%)    PHYSICAL EXAM:  General: no acute distress, debilitated  Eyes:  anicteric, no conjunctival injection, no discharge  Oropharynx: no lesions or injection 	  Neck: supple, without adenopathy  Lungs: diminished at bases  Heart: regular rate and rhythm; no murmur, rubs or gallops  Abdomen: soft, nondistended, nontender, without mass or organomegaly  Skin: no generalized rash; R flank wound dressings  Extremities: + edema; R PICC site clean  Neurologic: somnolent, moves all extremities    LAB RESULTS:                        6.1    21.61 )-----------( 287      ( 08 Mar 2019 09:25 )             21.1   03-08    146<H>  |  109<H>  |  42<H>  ----------------------------<  103<H>  4.6   |  25  |  2.00<H>    Ca    8.5      08 Mar 2019 05:22    MICROBIOLOGY:  RECENT CULTURES:  Culture - Blood (02.28.19 @ 17:53)    Specimen Source: .Blood Blood    Culture Results:   No growth at 5 days.    RADIOLOGY REVIEWED:  Xray Chest 1 View- PORTABLE-Urgent (03.06.19 @ 13:29) >  Nodular densities projecting over the right apex are new from 2/12/2019.   Recommend further evaluation with chest CT.

## 2019-03-08 NOTE — PROGRESS NOTE ADULT - PROBLEM SELECTOR PLAN 1
noted rise in serum creatinine  suspected that it's in relation to drop in H/H  transfuse as needed  I took the liberty of stoping Lasix and follow Serum creatinine trend  Consider decreasing Cyclosporine dose to 75mg BID since level is high for YONATAN  s/p picc line for iv abx  avoid nephrotoxins  trend bmp

## 2019-03-08 NOTE — PROGRESS NOTE ADULT - SUBJECTIVE AND OBJECTIVE BOX
[facility] - KIRTI Castillo / KIRTI Gracia / RITA Daniels/ KIRTI Zamudio/ KIRTI Irizarry/ LOLLY Hays / CLINT Forbes / LEBRON Boss  ---------------------------------------------------------------------------------------------------------------  seen and examined today for   Interval :  VITALS:  T(F): 98.2 (03-08-19 @ 09:40), Max: 99.6 (03-07-19 @ 16:45)  HR: 90 (03-08-19 @ 09:40)  BP: 98/61 (03-08-19 @ 09:40)  RR: 18 (03-08-19 @ 09:40)  SpO2: 95% (03-08-19 @ 09:40)  Wt(kg): --    03-07 @ 07:01  -  03-08 @ 07:00  --------------------------------------------------------  IN: 550 mL / OUT: 0 mL / NET: 550 mL    03-08 @ 07:01  -  03-08 @ 10:48  --------------------------------------------------------  IN: 100 mL / OUT: 0 mL / NET: 100 mL      Physical Exam :-  Constitutional: NAD  Neck: Supple.  Respiratory: Bilateral equal breath sounds,  Cardiovascular: S1, S2 normal,  Gastrointestinal: Bowel Sounds present, soft, non tender.  Extremities: No edema  Neurological: Alert and Oriented x 3, no focal deficits  Psychiatric: Normal mood, normal affect  Data:-  Allergies :   amoxicillin (Other)  IV Contrast (Flushing (Skin); Nausea)    Hospital Medications:   MEDICATIONS  (STANDING):  ascorbic acid 500 milliGRAM(s) Oral daily  aspirin enteric coated 81 milliGRAM(s) Oral daily  atorvastatin 10 milliGRAM(s) Oral at bedtime  bisacodyl 5 milliGRAM(s) Oral at bedtime  calcium carbonate 1250 mG  + Vitamin D (OsCal 500 + D) 1 Tablet(s) Oral three times a day  cholecalciferol 1000 Unit(s) Oral daily  cycloSPORINE  (SandIMMUNE)  Solution 100 milliGRAM(s) Oral two times a day  Dakins Solution - 1/4 Strength 1 Application(s) Topical two times a day  DAPTOmycin IVPB 700 milliGRAM(s) IV Intermittent every 24 hours  escitalopram 5 milliGRAM(s) Oral daily  folic acid 1 milliGRAM(s) Oral daily  furosemide   Injectable 20 milliGRAM(s) IV Push two times a day  gabapentin 100 milliGRAM(s) Oral two times a day  heparin  Injectable 5000 Unit(s) SubCutaneous every 8 hours  iron sucrose IVPB 200 milliGRAM(s) IV Intermittent once  metoprolol tartrate 12.5 milliGRAM(s) Oral two times a day  nystatin    Suspension 434897 Unit(s) Swish and Swallow three times a day  polyethylene glycol 3350 17 Gram(s) Oral daily  vitamin B complex with vitamin C 1 Tablet(s) Oral daily    03-08    146<H>  |  109<H>  |  42<H>  ----------------------------<  103<H>  4.6   |  25  |  2.00<H>    Ca    8.5      08 Mar 2019 05:22      Creatinine Trend: 2.00 <--, 1.96 <--, 1.65 <--, 1.58 <--, 1.58 <--, 1.65 <--, 1.65 <--                        6.9    22.7  )-----------( 314      ( 07 Mar 2019 12:28 )             22.8

## 2019-03-08 NOTE — PROGRESS NOTE ADULT - ASSESSMENT
79 yo F hx PMR, Pyoderma Gangrenosum (prior steroids, now on cyclosporine), s/p TAVR and explant of infected R Knee for MRSA (doxy suppression, spacer) admitted with fever to 102 and leukocytosis- S epi in 3 of 4 Bld Cxs  Prior S epi bacteremia Sept '18  No obvious primary source for S epi at present- raises concern of either heart valve infection or PPM lead infection- ?seeding from prior episode  Her latest isolate is resistant to beta lactams and most second line agents, Vanco EDWARD 4.  F/u blood cultures 2/14 and 2/28 negative  New L LE DVT noted (IVC filter already in place)  Afebrile, but Leukocytosis persists- ongoing wound breakdown  CXR reviewed, nodular opacities seen  Switched to Dapto with rising Creat    Suggest:  Continue Dapto, day 23/42   Follow temps, Creat, and CBC/diff   Local wound care  May need steroids if wounds are worsening on cyclosporine  Favor chest CT, if embolic lesions seen this would point to R sided endocarditis- lead infection

## 2019-03-09 DIAGNOSIS — R60.9 EDEMA, UNSPECIFIED: ICD-10-CM

## 2019-03-09 DIAGNOSIS — R78.81 BACTEREMIA: ICD-10-CM

## 2019-03-09 DIAGNOSIS — E87.0 HYPEROSMOLALITY AND HYPERNATREMIA: ICD-10-CM

## 2019-03-09 LAB
ANION GAP SERPL CALC-SCNC: 13 MMOL/L — SIGNIFICANT CHANGE UP (ref 5–17)
BASOPHILS # BLD AUTO: 0.04 K/UL — SIGNIFICANT CHANGE UP (ref 0–0.2)
BASOPHILS NFR BLD AUTO: 0.2 % — SIGNIFICANT CHANGE UP (ref 0–2)
BUN SERPL-MCNC: 42 MG/DL — HIGH (ref 7–23)
CALCIUM SERPL-MCNC: 8.7 MG/DL — SIGNIFICANT CHANGE UP (ref 8.4–10.5)
CHLORIDE SERPL-SCNC: 111 MMOL/L — HIGH (ref 96–108)
CO2 SERPL-SCNC: 25 MMOL/L — SIGNIFICANT CHANGE UP (ref 22–31)
CREAT SERPL-MCNC: 1.79 MG/DL — HIGH (ref 0.5–1.3)
CYCLOSPORINE SER-MCNC: 176 NG/ML — SIGNIFICANT CHANGE UP (ref 150–400)
EOSINOPHIL # BLD AUTO: 0.09 K/UL — SIGNIFICANT CHANGE UP (ref 0–0.5)
EOSINOPHIL NFR BLD AUTO: 0.4 % — SIGNIFICANT CHANGE UP (ref 0–6)
GLUCOSE SERPL-MCNC: 95 MG/DL — SIGNIFICANT CHANGE UP (ref 70–99)
HCT VFR BLD CALC: 24.7 % — LOW (ref 34.5–45)
HGB BLD-MCNC: 7.2 G/DL — LOW (ref 11.5–15.5)
IMM GRANULOCYTES NFR BLD AUTO: 4 % — HIGH (ref 0–1.5)
LYMPHOCYTES # BLD AUTO: 2.2 K/UL — SIGNIFICANT CHANGE UP (ref 1–3.3)
LYMPHOCYTES # BLD AUTO: 9.2 % — LOW (ref 13–44)
MCHC RBC-ENTMCNC: 26.7 PG — LOW (ref 27–34)
MCHC RBC-ENTMCNC: 29.1 GM/DL — LOW (ref 32–36)
MCV RBC AUTO: 91.5 FL — SIGNIFICANT CHANGE UP (ref 80–100)
MONOCYTES # BLD AUTO: 0.95 K/UL — HIGH (ref 0–0.9)
MONOCYTES NFR BLD AUTO: 4 % — SIGNIFICANT CHANGE UP (ref 2–14)
NEUTROPHILS # BLD AUTO: 19.56 K/UL — HIGH (ref 1.8–7.4)
NEUTROPHILS NFR BLD AUTO: 82.2 % — HIGH (ref 43–77)
PLATELET # BLD AUTO: 314 K/UL — SIGNIFICANT CHANGE UP (ref 150–400)
POTASSIUM SERPL-MCNC: 4.6 MMOL/L — SIGNIFICANT CHANGE UP (ref 3.5–5.3)
POTASSIUM SERPL-SCNC: 4.6 MMOL/L — SIGNIFICANT CHANGE UP (ref 3.5–5.3)
RBC # BLD: 2.7 M/UL — LOW (ref 3.8–5.2)
RBC # FLD: 17.8 % — HIGH (ref 10.3–14.5)
SODIUM SERPL-SCNC: 149 MMOL/L — HIGH (ref 135–145)
WBC # BLD: 23.8 K/UL — HIGH (ref 3.8–10.5)
WBC # FLD AUTO: 23.8 K/UL — HIGH (ref 3.8–10.5)

## 2019-03-09 RX ADMIN — Medication 1 TABLET(S): at 05:48

## 2019-03-09 RX ADMIN — Medication 500000 UNIT(S): at 05:48

## 2019-03-09 RX ADMIN — HEPARIN SODIUM 5000 UNIT(S): 5000 INJECTION INTRAVENOUS; SUBCUTANEOUS at 21:15

## 2019-03-09 RX ADMIN — OXYCODONE AND ACETAMINOPHEN 1 TABLET(S): 5; 325 TABLET ORAL at 19:38

## 2019-03-09 RX ADMIN — GABAPENTIN 100 MILLIGRAM(S): 400 CAPSULE ORAL at 18:37

## 2019-03-09 RX ADMIN — Medication 5 MILLIGRAM(S): at 21:17

## 2019-03-09 RX ADMIN — ATORVASTATIN CALCIUM 10 MILLIGRAM(S): 80 TABLET, FILM COATED ORAL at 21:15

## 2019-03-09 RX ADMIN — CYCLOSPORINE 75 MILLIGRAM(S): 100 CAPSULE ORAL at 12:57

## 2019-03-09 RX ADMIN — Medication 1 MILLIGRAM(S): at 11:35

## 2019-03-09 RX ADMIN — Medication 650 MILLIGRAM(S): at 13:55

## 2019-03-09 RX ADMIN — Medication 12.5 MILLIGRAM(S): at 18:41

## 2019-03-09 RX ADMIN — Medication 1 APPLICATION(S): at 21:13

## 2019-03-09 RX ADMIN — Medication 500000 UNIT(S): at 21:15

## 2019-03-09 RX ADMIN — DAPTOMYCIN 128 MILLIGRAM(S): 500 INJECTION, POWDER, LYOPHILIZED, FOR SOLUTION INTRAVENOUS at 21:14

## 2019-03-09 RX ADMIN — POLYETHYLENE GLYCOL 3350 17 GRAM(S): 17 POWDER, FOR SOLUTION ORAL at 11:36

## 2019-03-09 RX ADMIN — Medication 650 MILLIGRAM(S): at 12:55

## 2019-03-09 RX ADMIN — HEPARIN SODIUM 5000 UNIT(S): 5000 INJECTION INTRAVENOUS; SUBCUTANEOUS at 12:59

## 2019-03-09 RX ADMIN — GABAPENTIN 100 MILLIGRAM(S): 400 CAPSULE ORAL at 05:48

## 2019-03-09 RX ADMIN — Medication 1 TABLET(S): at 11:34

## 2019-03-09 RX ADMIN — Medication 500 MILLIGRAM(S): at 11:35

## 2019-03-09 RX ADMIN — Medication 81 MILLIGRAM(S): at 11:35

## 2019-03-09 RX ADMIN — Medication 500000 UNIT(S): at 12:59

## 2019-03-09 RX ADMIN — Medication 1 APPLICATION(S): at 13:00

## 2019-03-09 RX ADMIN — OXYCODONE AND ACETAMINOPHEN 1 TABLET(S): 5; 325 TABLET ORAL at 18:37

## 2019-03-09 RX ADMIN — CYCLOSPORINE 75 MILLIGRAM(S): 100 CAPSULE ORAL at 21:16

## 2019-03-09 RX ADMIN — HEPARIN SODIUM 5000 UNIT(S): 5000 INJECTION INTRAVENOUS; SUBCUTANEOUS at 05:48

## 2019-03-09 RX ADMIN — Medication 1000 UNIT(S): at 11:35

## 2019-03-09 RX ADMIN — Medication 1 TABLET(S): at 21:15

## 2019-03-09 RX ADMIN — Medication 1 TABLET(S): at 12:58

## 2019-03-09 RX ADMIN — ESCITALOPRAM OXALATE 5 MILLIGRAM(S): 10 TABLET, FILM COATED ORAL at 11:35

## 2019-03-09 NOTE — PROGRESS NOTE ADULT - SUBJECTIVE AND OBJECTIVE BOX
Sp 1 unit of pRBC yesterday  She denies any new symptoms this morning    Vital Signs Last 24 Hrs  T(C): 37.1 (09 Mar 2019 08:15), Max: 37.6 (08 Mar 2019 19:35)  T(F): 98.7 (09 Mar 2019 08:15), Max: 99.6 (08 Mar 2019 19:35)  HR: 100 (09 Mar 2019 08:15) (70 - 102)  BP: 114/71 (09 Mar 2019 08:15) (98/61 - 116/70)  BP(mean): --  RR: 18 (09 Mar 2019 08:15) (18 - 20)  SpO2: 97% (09 Mar 2019 08:15) (93% - 98%)    GENERAL: NAD, lying in bed, awake alert, morbidly obese  HEAD:  Atraumatic, Normocephalic  EYES: EOMI, PERRLA, conjunctiva and sclera clear  NECK: Supple, No JVD  CHEST/LUNG: mild decrease breath sounds bilaterally; No wheeze   HEART: Regular rate and rhythm; No murmurs, rubs, or gallops  ABDOMEN: Soft, Nontender, Nondistended; Bowel sounds present  Neuro: AAO x 2, no focal deficit  EXTREMITIES:  2+ Peripheral Pulses, No clubbing, cyanosis.  2+ nonpitting edema LE, UE 2-3+ edema  Back: sacral and thigh wound. +wound vac in place  SKIN: No rashes or lesions    LABS:                        7.8    23.7  )-----------( 321      ( 08 Mar 2019 22:16 )             24.2     03-09    149<H>  |  111<H>  |  42<H>  ----------------------------<  95  4.6   |  25  |  1.79<H>    Ca    8.7      09 Mar 2019 06:11        CAPILLARY BLOOD GLUCOSE        CARDIAC MARKERS ( 08 Mar 2019 05:22 )  x     / x     / 39 U/L / x     / x

## 2019-03-09 NOTE — PROGRESS NOTE ADULT - ASSESSMENT
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  F/u blood cultures 2/14 are negative  New L LE DVT noted (IVC filter already in place)  leukocytosis not moderating, indeed it has been slowly increasing, it may be in part reactive, as she is anemic.  CXR reviewed, nodular opacities seen, favor CT as she could have embolized to lung from RT sided endocarditis  Leukocytosis noted, has been   rising over past week  Suggest:  will continue dapto 6mg/kg 24  anticipate a 6  wk course via PICC, day 23/42   Local wound care  will repeat blood cultures  Perhaps increased wound breakdown accounting for leukocytosis  May need steroids if wounds are worsening on cyclosporine  Favor chest CT, if embolic lesions seen this would point to RT sided endocarditis of device lead infection

## 2019-03-09 NOTE — PROGRESS NOTE ADULT - SUBJECTIVE AND OBJECTIVE BOX
Patient seen and examined  no complaints    amoxicillin (Other)  IV Contrast (Flushing (Skin); Nausea)    Hospital Medications:   MEDICATIONS  (STANDING):  ascorbic acid 500 milliGRAM(s) Oral daily  aspirin enteric coated 81 milliGRAM(s) Oral daily  atorvastatin 10 milliGRAM(s) Oral at bedtime  bisacodyl 5 milliGRAM(s) Oral at bedtime  calcium carbonate 1250 mG  + Vitamin D (OsCal 500 + D) 1 Tablet(s) Oral three times a day  cholecalciferol 1000 Unit(s) Oral daily  cycloSPORINE  (SandIMMUNE)  Solution 75 milliGRAM(s) Oral two times a day  Dakins Solution - 1/4 Strength 1 Application(s) Topical two times a day  DAPTOmycin IVPB 700 milliGRAM(s) IV Intermittent every 24 hours  escitalopram 5 milliGRAM(s) Oral daily  folic acid 1 milliGRAM(s) Oral daily  gabapentin 100 milliGRAM(s) Oral two times a day  heparin  Injectable 5000 Unit(s) SubCutaneous every 8 hours  metoprolol tartrate 12.5 milliGRAM(s) Oral two times a day  nystatin    Suspension 755722 Unit(s) Swish and Swallow three times a day  polyethylene glycol 3350 17 Gram(s) Oral daily  vitamin B complex with vitamin C 1 Tablet(s) Oral daily      VITALS:  T(F): 98.7 (03-09-19 @ 08:15), Max: 99.6 (03-08-19 @ 19:35)  HR: 100 (03-09-19 @ 08:15)  BP: 114/71 (03-09-19 @ 08:15)  RR: 18 (03-09-19 @ 08:15)  SpO2: 97% (03-09-19 @ 08:15)  Wt(kg): --    03-08 @ 07:01  -  03-09 @ 07:00  --------------------------------------------------------  IN: 620 mL / OUT: 0 mL / NET: 620 mL    03-09 @ 06:01  -  03-09 @ 13:30  --------------------------------------------------------  IN: 360 mL / OUT: 0 mL / NET: 360 mL      Physical Exam :-  Constitutional: NAD  Neck: Supple.  Respiratory: Bilateral equal breath sounds,  Cardiovascular: S1, S2 normal,  Gastrointestinal: Bowel Sounds present, soft, non tender.  Extremities: +++ edema  Neurological: Alert and Oriented x 3, no focal deficits  Psychiatric: Normal mood, normal affect  LABS:  03-09    149<H>  |  111<H>  |  42<H>  ----------------------------<  95  4.6   |  25  |  1.79<H>    Ca    8.7      09 Mar 2019 06:11      Creatinine Trend: 1.79 <--, 2.00 <--, 1.96 <--, 1.65 <--, 1.58 <--, 1.58 <--, 1.65 <--                        7.8    23.7  )-----------( 321      ( 08 Mar 2019 22:16 )             24.2     Urine Studies:      RADIOLOGY & ADDITIONAL STUDIES:

## 2019-03-09 NOTE — PROGRESS NOTE ADULT - SUBJECTIVE AND OBJECTIVE BOX
CC: f/u for coag negative staph bacteremia    Patient reports: no complaints, sleepy    REVIEW OF SYSTEMS:  All other review of systems negative (Comprehensive ROS)    Antimicrobials Day #  :23/42  DAPTOmycin IVPB 700 milliGRAM(s) IV Intermittent every 24 hours  nystatin    Suspension 127223 Unit(s) Swish and Swallow three times a day    Other Medications Reviewed    T(F): 99.5 (03-08-19 @ 22:01), Max: 99.6 (03-08-19 @ 19:35)  HR: 101 (03-08-19 @ 22:01)  BP: 101/59 (03-08-19 @ 22:01)  RR: 18 (03-08-19 @ 22:01)  SpO2: 95% (03-08-19 @ 22:01)  Wt(kg): --    PHYSICAL EXAM:  General: alert, no acute distress, morbidly obese  Eyes:  anicteric, no conjunctival injection, no discharge  Oropharynx: no lesions or injection 	  Neck: supple, without adenopathy  Lungs: clear to auscultation  Heart: regular rate and rhythm; no murmur, rubs or gallops  Abdomen: soft, nondistended, nontender, without mass or organomegaly  Skin: wounds dressed  Extremities: no clubbing, cyanosis, + edema  Neurologic: alert, oriented, moves all extremities  Rt knee spacer well healed  PICC line rt arm  LAB RESULTS:                        7.8    23.7  )-----------( 321      ( 08 Mar 2019 22:16 )             24.2     03-09    149<H>  |  111<H>  |  42<H>  ----------------------------<  95  4.6   |  25  |  1.79<H>    Ca    8.7      09 Mar 2019 06:11          MICROBIOLOGY:  RECENT CULTURES:      RADIOLOGY REVIEWED:    < from: Xray Chest 1 View- PORTABLE-Urgent (03.06.19 @ 13:29) >  IMPRESSION:    Nodular densities projecting over the right apex are new from 2/12/2019.   Recommend further evaluation with chest CT.    < end of copied text >

## 2019-03-09 NOTE — PROGRESS NOTE ADULT - PROBLEM SELECTOR PLAN 1
2' S epi  Daptomycin last day is 3/28/19; PICC placed 3/6/19  blood cultures from 2/28 are negative.  ID f/u appreciated  TTE without discrete vegetation  Too high-risk for ERIKA  Would have to discuss with renal regarding CTA chest if we were to look for septic emboli

## 2019-03-09 NOTE — PROGRESS NOTE ADULT - PROBLEM SELECTOR PLAN 1
cr relatively stable  would avoid iv contrast as pt is high risk for argelia and possibility of needing HD  off diuretics now  will monitor  avoid nephrotoxins

## 2019-03-10 LAB
ANION GAP SERPL CALC-SCNC: 12 MMOL/L — SIGNIFICANT CHANGE UP (ref 5–17)
BUN SERPL-MCNC: 41 MG/DL — HIGH (ref 7–23)
CALCIUM SERPL-MCNC: 8.7 MG/DL — SIGNIFICANT CHANGE UP (ref 8.4–10.5)
CHLORIDE SERPL-SCNC: 110 MMOL/L — HIGH (ref 96–108)
CO2 SERPL-SCNC: 26 MMOL/L — SIGNIFICANT CHANGE UP (ref 22–31)
CREAT SERPL-MCNC: 1.73 MG/DL — HIGH (ref 0.5–1.3)
CYCLOSPORINE SER-MCNC: 205 NG/ML — SIGNIFICANT CHANGE UP (ref 150–400)
GLUCOSE SERPL-MCNC: 90 MG/DL — SIGNIFICANT CHANGE UP (ref 70–99)
HCT VFR BLD CALC: 24.1 % — LOW (ref 34.5–45)
HGB BLD-MCNC: 7.8 G/DL — LOW (ref 11.5–15.5)
MCHC RBC-ENTMCNC: 27.9 PG — SIGNIFICANT CHANGE UP (ref 27–34)
MCHC RBC-ENTMCNC: 32.4 GM/DL — SIGNIFICANT CHANGE UP (ref 32–36)
MCV RBC AUTO: 86.2 FL — SIGNIFICANT CHANGE UP (ref 80–100)
PLATELET # BLD AUTO: 311 K/UL — SIGNIFICANT CHANGE UP (ref 150–400)
POTASSIUM SERPL-MCNC: 4.6 MMOL/L — SIGNIFICANT CHANGE UP (ref 3.5–5.3)
POTASSIUM SERPL-SCNC: 4.6 MMOL/L — SIGNIFICANT CHANGE UP (ref 3.5–5.3)
RBC # BLD: 2.79 M/UL — LOW (ref 3.8–5.2)
RBC # FLD: 16.8 % — HIGH (ref 10.3–14.5)
SODIUM SERPL-SCNC: 148 MMOL/L — HIGH (ref 135–145)
WBC # BLD: 24 K/UL — HIGH (ref 3.8–10.5)
WBC # FLD AUTO: 24 K/UL — HIGH (ref 3.8–10.5)

## 2019-03-10 PROCEDURE — 71250 CT THORAX DX C-: CPT | Mod: 26

## 2019-03-10 RX ORDER — ACETAMINOPHEN 500 MG
1000 TABLET ORAL ONCE
Qty: 0 | Refills: 0 | Status: COMPLETED | OUTPATIENT
Start: 2019-03-10 | End: 2019-03-10

## 2019-03-10 RX ORDER — ACETAMINOPHEN 500 MG
650 TABLET ORAL EVERY 6 HOURS
Qty: 0 | Refills: 0 | Status: DISCONTINUED | OUTPATIENT
Start: 2019-03-10 | End: 2019-03-25

## 2019-03-10 RX ADMIN — Medication 500000 UNIT(S): at 22:47

## 2019-03-10 RX ADMIN — Medication 1 MILLIGRAM(S): at 15:48

## 2019-03-10 RX ADMIN — CYCLOSPORINE 75 MILLIGRAM(S): 100 CAPSULE ORAL at 14:39

## 2019-03-10 RX ADMIN — Medication 1 TABLET(S): at 06:06

## 2019-03-10 RX ADMIN — HEPARIN SODIUM 5000 UNIT(S): 5000 INJECTION INTRAVENOUS; SUBCUTANEOUS at 22:47

## 2019-03-10 RX ADMIN — GABAPENTIN 100 MILLIGRAM(S): 400 CAPSULE ORAL at 06:07

## 2019-03-10 RX ADMIN — Medication 500000 UNIT(S): at 15:50

## 2019-03-10 RX ADMIN — Medication 1 APPLICATION(S): at 15:46

## 2019-03-10 RX ADMIN — HEPARIN SODIUM 5000 UNIT(S): 5000 INJECTION INTRAVENOUS; SUBCUTANEOUS at 06:07

## 2019-03-10 RX ADMIN — Medication 81 MILLIGRAM(S): at 15:51

## 2019-03-10 RX ADMIN — Medication 12.5 MILLIGRAM(S): at 06:09

## 2019-03-10 RX ADMIN — Medication 1 APPLICATION(S): at 20:00

## 2019-03-10 RX ADMIN — Medication 400 MILLIGRAM(S): at 11:53

## 2019-03-10 RX ADMIN — Medication 1 TABLET(S): at 15:50

## 2019-03-10 RX ADMIN — Medication 500000 UNIT(S): at 06:07

## 2019-03-10 RX ADMIN — POLYETHYLENE GLYCOL 3350 17 GRAM(S): 17 POWDER, FOR SOLUTION ORAL at 15:48

## 2019-03-10 RX ADMIN — DAPTOMYCIN 128 MILLIGRAM(S): 500 INJECTION, POWDER, LYOPHILIZED, FOR SOLUTION INTRAVENOUS at 18:23

## 2019-03-10 RX ADMIN — ESCITALOPRAM OXALATE 5 MILLIGRAM(S): 10 TABLET, FILM COATED ORAL at 15:49

## 2019-03-10 RX ADMIN — Medication 5 MILLIGRAM(S): at 22:47

## 2019-03-10 RX ADMIN — Medication 1 TABLET(S): at 15:47

## 2019-03-10 RX ADMIN — HEPARIN SODIUM 5000 UNIT(S): 5000 INJECTION INTRAVENOUS; SUBCUTANEOUS at 15:49

## 2019-03-10 RX ADMIN — GABAPENTIN 100 MILLIGRAM(S): 400 CAPSULE ORAL at 18:23

## 2019-03-10 RX ADMIN — Medication 1000 UNIT(S): at 15:48

## 2019-03-10 RX ADMIN — CYCLOSPORINE 75 MILLIGRAM(S): 100 CAPSULE ORAL at 22:47

## 2019-03-10 RX ADMIN — Medication 500 MILLIGRAM(S): at 15:47

## 2019-03-10 RX ADMIN — Medication 1 TABLET(S): at 22:47

## 2019-03-10 RX ADMIN — ATORVASTATIN CALCIUM 10 MILLIGRAM(S): 80 TABLET, FILM COATED ORAL at 22:46

## 2019-03-10 NOTE — PROGRESS NOTE ADULT - PROBLEM SELECTOR PLAN 1
Discussed plan of care with patient and/or family at this time. Questions encouraged and addressed. Call light within reach. cr relatively stable  would avoid iv contrast as pt is high risk for argelia and possibility of needing HD  off diuretics now- will likely need to re initiate soon  will monitor  avoid nephrotoxins

## 2019-03-10 NOTE — PROGRESS NOTE ADULT - PROBLEM SELECTOR PLAN 1
2' S epi  Daptomycin last day is 3/28/19; PICC placed 3/6/19  blood cultures from 2/28 are negative.  ID f/u appreciated  TTE without discrete vegetation  Too high-risk for ERIKA  Ordered CT chest noncontrast to r/o consolidation/infiltrate

## 2019-03-10 NOTE — PROGRESS NOTE ADULT - ASSESSMENT
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  F/u blood cultures 2/14 are negative  New L LE DVT noted (IVC filter already in place)  leukocytosis not moderating, indeed it has been slowly increasing, it may be in part reactive, as she is anemic.  CXR reviewed, nodular opacities seen, favor CT as she could have embolized to lung from RT sided endocarditis  Leukocytosis noted, has been   rising over past week  Suggest:  will continue dapto 6mg/kg 24  anticipate a 6  wk course via PICC, day 24/42   If creatinine rises further will change dapto to q48  Local wound care  will repeat blood cultures  Perhaps increased wound breakdown accounting for leukocytosis  May need steroids if wounds are worsening on cyclosporine  Favor chest CT, if embolic lesions seen this would point to RT sided endocarditis of device lead infection  I would obtain a non contrast chest CT, we should be able to better characterize lung lesions seen on CXR.Perhaps a pulmonary process is playing a role with leukocytosis.

## 2019-03-10 NOTE — PROGRESS NOTE ADULT - SUBJECTIVE AND OBJECTIVE BOX
Patient seen and examined  no complaints    amoxicillin (Other)  IV Contrast (Flushing (Skin); Nausea)    Hospital Medications:   MEDICATIONS  (STANDING):  ascorbic acid 500 milliGRAM(s) Oral daily  aspirin enteric coated 81 milliGRAM(s) Oral daily  atorvastatin 10 milliGRAM(s) Oral at bedtime  bisacodyl 5 milliGRAM(s) Oral at bedtime  calcium carbonate 1250 mG  + Vitamin D (OsCal 500 + D) 1 Tablet(s) Oral three times a day  cholecalciferol 1000 Unit(s) Oral daily  cycloSPORINE  (SandIMMUNE)  Solution 75 milliGRAM(s) Oral two times a day  Dakins Solution - 1/4 Strength 1 Application(s) Topical two times a day  DAPTOmycin IVPB 700 milliGRAM(s) IV Intermittent every 24 hours  escitalopram 5 milliGRAM(s) Oral daily  folic acid 1 milliGRAM(s) Oral daily  gabapentin 100 milliGRAM(s) Oral two times a day  heparin  Injectable 5000 Unit(s) SubCutaneous every 8 hours  metoprolol tartrate 12.5 milliGRAM(s) Oral two times a day  nystatin    Suspension 125787 Unit(s) Swish and Swallow three times a day  polyethylene glycol 3350 17 Gram(s) Oral daily  vitamin B complex with vitamin C 1 Tablet(s) Oral daily        VITALS:  T(F): 100.5 (03-10-19 @ 11:00), Max: 100.5 (03-10-19 @ 11:00)  HR: 98 (03-10-19 @ 11:00)  BP: 101/60 (03-10-19 @ 11:00)  RR: 18 (03-10-19 @ 11:00)  SpO2: 97% (03-10-19 @ 11:00)  Wt(kg): --    03-09 @ 06:01  -  03-10 @ 07:00  --------------------------------------------------------  IN: 870 mL / OUT: 200 mL / NET: 670 mL    03-10 @ 07:01  -  03-10 @ 14:53  --------------------------------------------------------  IN: 400 mL / OUT: 0 mL / NET: 400 mL        Physical Exam :-  Constitutional: NAD  Neck: Supple.  Respiratory: Bilateral equal breath sounds,  Cardiovascular: S1, S2 normal,  Gastrointestinal: Bowel Sounds present, soft, non tender.  Extremities: +++ edema  Neurological: , no focal deficits  Psychiatric: Normal mood, normal affect    LABS:  03-10    148<H>  |  110<H>  |  41<H>  ----------------------------<  90  4.6   |  26  |  1.73<H>    Ca    8.7      10 Mar 2019 07:24      Creatinine Trend: 1.73 <--, 1.79 <--, 2.00 <--, 1.96 <--, 1.65 <--, 1.58 <--, 1.58 <--                        7.8    24.0  )-----------( 311      ( 10 Mar 2019 07:24 )             24.1     Urine Studies:      RADIOLOGY & ADDITIONAL STUDIES:

## 2019-03-10 NOTE — PROGRESS NOTE ADULT - SUBJECTIVE AND OBJECTIVE BOX
CC: f/u for coag negative staph bacteremia    Patient reports: she complains of pain all over, screams when skin touched    REVIEW OF SYSTEMS:  All other review of systems negative (Comprehensive ROS)    Antimicrobials Day #  :24/42  DAPTOmycin IVPB 700 milliGRAM(s) IV Intermittent every 24 hours  nystatin    Suspension 124849 Unit(s) Swish and Swallow three times a day    Other Medications Reviewed    T(F): 98.4 (03-09-19 @ 22:06), Max: 98.7 (03-09-19 @ 08:15)  HR: 78 (03-10-19 @ 06:09)  BP: 148/68 (03-10-19 @ 06:09)  RR: 18 (03-10-19 @ 06:09)  SpO2: 98% (03-10-19 @ 06:09)  Wt(kg): --    PHYSICAL EXAM:  General: alert, no acute distress, morbidly obese  Eyes:  anicteric, no conjunctival injection, no discharge  Oropharynx: no lesions or injection 	  Neck: supple, without adenopathy  Lungs: clear to auscultation  Heart: regular rate and rhythm; no murmur, rubs or gallops  Abdomen: soft, nondistended, nontender, without mass or organomegaly  Skin: flank and back wounds dressed  Extremities: no clubbing, cyanosis, or edema  Neurologic: alert, oriented, moves all extremities  tender to touch throughout  LAB RESULTS:                        7.2    23.80 )-----------( 314      ( 09 Mar 2019 12:03 )             24.7     03-09    149<H>  |  111<H>  |  42<H>  ----------------------------<  95  4.6   |  25  |  1.79<H>    Ca    8.7      09 Mar 2019 06:11          MICROBIOLOGY:  RECENT CULTURES:  blood cultures pending    RADIOLOGY REVIEWED:  < from: Xray Chest 1 View- PORTABLE-Urgent (03.06.19 @ 13:29) >  IMPRESSION:    Nodular densities projecting over the right apex are new from 2/12/2019.   Recommend further evaluation with chest CT.    < end of copied text >

## 2019-03-10 NOTE — PROGRESS NOTE ADULT - SUBJECTIVE AND OBJECTIVE BOX
Feels uncomfortable every time wound vac dressing is changed    Vital Signs Last 24 Hrs  T(C): 36.9 (09 Mar 2019 22:06), Max: 36.9 (09 Mar 2019 22:06)  T(F): 98.4 (09 Mar 2019 22:06), Max: 98.4 (09 Mar 2019 22:06)  HR: 78 (10 Mar 2019 06:09) (76 - 78)  BP: 148/68 (10 Mar 2019 06:09) (102/64 - 148/68)  BP(mean): --  RR: 18 (10 Mar 2019 06:09) (18 - 18)  SpO2: 98% (10 Mar 2019 06:09) (98% - 98%)    GENERAL: NAD, lying in bed, awake alert, morbidly obese  HEAD:  Atraumatic, Normocephalic  EYES: EOMI, PERRLA, conjunctiva and sclera clear  NECK: Supple, No JVD  CHEST/LUNG: mild decrease breath sounds bilaterally; No wheeze   HEART: Regular rate and rhythm; No murmurs, rubs, or gallops  ABDOMEN: Soft, Nontender, Nondistended; Bowel sounds present  Neuro: AAO x 2, no focal deficit  EXTREMITIES:  2+ Peripheral Pulses, No clubbing, cyanosis.  2+ nonpitting edema LE, UE 2-3+ edema  Back: sacral and thigh wound. +wound vac in place  SKIN: No rashes or lesions    LABS:                        7.8    24.0  )-----------( 311      ( 10 Mar 2019 07:24 )             24.1     03-10    148<H>  |  110<H>  |  41<H>  ----------------------------<  90  4.6   |  26  |  1.73<H>    Ca    8.7      10 Mar 2019 07:24        CAPILLARY BLOOD GLUCOSE

## 2019-03-11 DIAGNOSIS — Z29.9 ENCOUNTER FOR PROPHYLACTIC MEASURES, UNSPECIFIED: ICD-10-CM

## 2019-03-11 LAB
ANION GAP SERPL CALC-SCNC: 13 MMOL/L — SIGNIFICANT CHANGE UP (ref 5–17)
BASOPHILS # BLD AUTO: 0 K/UL — SIGNIFICANT CHANGE UP (ref 0–0.2)
BUN SERPL-MCNC: 42 MG/DL — HIGH (ref 7–23)
CALCIUM SERPL-MCNC: 8.7 MG/DL — SIGNIFICANT CHANGE UP (ref 8.4–10.5)
CHLORIDE SERPL-SCNC: 111 MMOL/L — HIGH (ref 96–108)
CO2 SERPL-SCNC: 25 MMOL/L — SIGNIFICANT CHANGE UP (ref 22–31)
CREAT SERPL-MCNC: 1.87 MG/DL — HIGH (ref 0.5–1.3)
CYCLOSPORINE SER-MCNC: 190 NG/ML — SIGNIFICANT CHANGE UP (ref 150–400)
EOSINOPHIL # BLD AUTO: 0.1 K/UL — SIGNIFICANT CHANGE UP (ref 0–0.5)
GLUCOSE SERPL-MCNC: 67 MG/DL — LOW (ref 70–99)
HCT VFR BLD CALC: 26.5 % — LOW (ref 34.5–45)
HGB BLD-MCNC: 8.2 G/DL — LOW (ref 11.5–15.5)
LYMPHOCYTES # BLD AUTO: 19 % — SIGNIFICANT CHANGE UP (ref 13–44)
LYMPHOCYTES # BLD AUTO: 5.3 K/UL — HIGH (ref 1–3.3)
MCHC RBC-ENTMCNC: 27 PG — SIGNIFICANT CHANGE UP (ref 27–34)
MCHC RBC-ENTMCNC: 31.1 GM/DL — LOW (ref 32–36)
MCV RBC AUTO: 87 FL — SIGNIFICANT CHANGE UP (ref 80–100)
MONOCYTES # BLD AUTO: 1 K/UL — HIGH (ref 0–0.9)
MONOCYTES NFR BLD AUTO: 4 % — SIGNIFICANT CHANGE UP (ref 2–14)
NEUTROPHILS # BLD AUTO: 25.8 K/UL — HIGH (ref 1.8–7.4)
NEUTROPHILS NFR BLD AUTO: 67 % — SIGNIFICANT CHANGE UP (ref 43–77)
NEUTS BAND # BLD: 10 % — HIGH (ref 0–8)
NRBC # BLD: 1 /100 — HIGH (ref 0–0)
PLAT MORPH BLD: NORMAL — SIGNIFICANT CHANGE UP
PLATELET # BLD AUTO: 337 K/UL — SIGNIFICANT CHANGE UP (ref 150–400)
POTASSIUM SERPL-MCNC: 4.8 MMOL/L — SIGNIFICANT CHANGE UP (ref 3.5–5.3)
POTASSIUM SERPL-SCNC: 4.8 MMOL/L — SIGNIFICANT CHANGE UP (ref 3.5–5.3)
RBC # BLD: 3.05 M/UL — LOW (ref 3.8–5.2)
RBC # FLD: 17.2 % — HIGH (ref 10.3–14.5)
RBC BLD AUTO: SIGNIFICANT CHANGE UP
SODIUM SERPL-SCNC: 149 MMOL/L — HIGH (ref 135–145)
WBC # BLD: 32.1 K/UL — HIGH (ref 3.8–10.5)
WBC # FLD AUTO: 32.1 K/UL — HIGH (ref 3.8–10.5)

## 2019-03-11 RX ORDER — SODIUM CHLORIDE 9 MG/ML
1000 INJECTION, SOLUTION INTRAVENOUS
Qty: 0 | Refills: 0 | Status: DISCONTINUED | OUTPATIENT
Start: 2019-03-11 | End: 2019-03-12

## 2019-03-11 RX ORDER — MEROPENEM 1 G/30ML
1000 INJECTION INTRAVENOUS EVERY 12 HOURS
Qty: 0 | Refills: 0 | Status: DISCONTINUED | OUTPATIENT
Start: 2019-03-11 | End: 2019-03-16

## 2019-03-11 RX ADMIN — HEPARIN SODIUM 5000 UNIT(S): 5000 INJECTION INTRAVENOUS; SUBCUTANEOUS at 14:22

## 2019-03-11 RX ADMIN — Medication 500000 UNIT(S): at 21:45

## 2019-03-11 RX ADMIN — Medication 1 APPLICATION(S): at 09:07

## 2019-03-11 RX ADMIN — Medication 1 TABLET(S): at 06:07

## 2019-03-11 RX ADMIN — DAPTOMYCIN 128 MILLIGRAM(S): 500 INJECTION, POWDER, LYOPHILIZED, FOR SOLUTION INTRAVENOUS at 18:40

## 2019-03-11 RX ADMIN — ATORVASTATIN CALCIUM 10 MILLIGRAM(S): 80 TABLET, FILM COATED ORAL at 21:44

## 2019-03-11 RX ADMIN — CYCLOSPORINE 75 MILLIGRAM(S): 100 CAPSULE ORAL at 21:45

## 2019-03-11 RX ADMIN — Medication 1 TABLET(S): at 21:45

## 2019-03-11 RX ADMIN — Medication 650 MILLIGRAM(S): at 09:06

## 2019-03-11 RX ADMIN — SODIUM CHLORIDE 100 MILLILITER(S): 9 INJECTION, SOLUTION INTRAVENOUS at 21:55

## 2019-03-11 RX ADMIN — Medication 1 APPLICATION(S): at 20:00

## 2019-03-11 RX ADMIN — Medication 500000 UNIT(S): at 06:07

## 2019-03-11 RX ADMIN — Medication 650 MILLIGRAM(S): at 09:36

## 2019-03-11 RX ADMIN — MEROPENEM 100 MILLIGRAM(S): 1 INJECTION INTRAVENOUS at 15:15

## 2019-03-11 RX ADMIN — SODIUM CHLORIDE 100 MILLILITER(S): 9 INJECTION, SOLUTION INTRAVENOUS at 11:43

## 2019-03-11 RX ADMIN — HEPARIN SODIUM 5000 UNIT(S): 5000 INJECTION INTRAVENOUS; SUBCUTANEOUS at 06:07

## 2019-03-11 RX ADMIN — GABAPENTIN 100 MILLIGRAM(S): 400 CAPSULE ORAL at 06:07

## 2019-03-11 RX ADMIN — HEPARIN SODIUM 5000 UNIT(S): 5000 INJECTION INTRAVENOUS; SUBCUTANEOUS at 21:45

## 2019-03-11 RX ADMIN — Medication 5 MILLIGRAM(S): at 21:45

## 2019-03-11 NOTE — PROGRESS NOTE ADULT - PROBLEM SELECTOR PROBLEM 7
Medication management
Gastroesophageal reflux disease, esophagitis presence not specified
Gastroesophageal reflux disease, esophagitis presence not specified
Medication management
Aortic stenosis, severe
Depression, unspecified depression type
Medication management
Depression, unspecified depression type

## 2019-03-11 NOTE — PROGRESS NOTE ADULT - PROBLEM SELECTOR PROBLEM 8
Depression, unspecified depression type
Medication management
Medication management
Depression, unspecified depression type
Gastroesophageal reflux disease, esophagitis presence not specified
Depression, unspecified depression type
Depression, unspecified depression type

## 2019-03-11 NOTE — PROGRESS NOTE ADULT - PROBLEM SELECTOR PROBLEM 3
Edema
Acute kidney failure
Coronary artery disease involving native coronary artery of native heart without angina pectoris
Edema
Pyoderma gangrenosum
Acute kidney failure
Acute kidney failure
Edema
Coronary artery disease involving native coronary artery of native heart without angina pectoris
Acute kidney failure
Altered mental status, unspecified altered mental status type
Altered mental status, unspecified altered mental status type

## 2019-03-11 NOTE — PROGRESS NOTE ADULT - PROBLEM SELECTOR PLAN 3
diuretics have been stopped   will monitor for now
likely cardiorenal. intravascular depleted but grossly volume overloaded.   poor PO intake.   daily BMP, monitor Cr  renal on the case.   Try to hold off IVF unless truly necessary, as she is third-spacing everything  s/p lund to alleviate retention component  Encourage PO intake.   s/p IV Lasix. Cr slightly coming down. proBNP stable around 1400s.   renal f/u appreciated  (Pharmacist)  initially adamant about decreasing Lasix to 20 mg qdaily (given she is not eating/drinking), now agreeable with BID dosing
Likely pre-renal from poor PO intake.  Ordered NS x 12 hours 2/23/19  daily BMP
Likely pre-renal from poor PO intake.  daily BMP  Try to hold off IVF unless truly necessary, as she is third-spacing everything
Likely pre-renal from poor PO intake.  daily BMP  Try to hold off IVF unless truly necessary, as she is third-spacing everything  hopefully lund will alleviate retention component
Likely pre-renal from poor PO intake.  daily BMP  Try to hold off IVF unless truly necessary, as she is third-spacing everything  hopefully lund will alleviate retention component
Need to clarify if patient is still on simvastatin as this can interact with cyclosporine.  May need change to other statin.
cr gradually trending up.  Likely pre-renal from poor PO intake.  S/p gentle IVF 2/19, would hold off any further for 2/21/19  daily BMP
cr gradually trending up.  Likely pre-renal from poor PO intake.  S/p gentle IVF yesterday, would hold off any further for 2/20/19  daily BMP
cr gradually trending up.  Likely pre-renal from poor PO intake.  will give her gentle IVF. monitor for edema and Cr  check bladder sono, straight cath PRN  renal consult if no improvement
cyclosporine temporarily held on admission, but later restarted.   Cyclosporine dose adjusted to 100 mg BID based on levels  completed chronic steroids taper for pyoderma.  (she was on Prednisone 5 mg qdaily)  Per orthopedic surgery can be OOB primarily to wheelchair and can 50% weight  wound care consult and wound vac management appreciated.
diuretics have been stopped   will monitor for now
likely cardiorenal. intravascular depleted but grossly volume overloaded.   poor PO intake.   daily BMP, monitor Cr  renal on the case.   Try to hold off IVF unless truly necessary, as she is third-spacing everything  s/p lund to alleviate retention component  Encourage PO intake.   s/p IV Lasix. Cr slightly coming down. proBNP stable around 1400s.   renal f/u appreciated
likely cardiorenal. intravascular depleted but grossly volume overloaded.   poor PO intake.   daily BMP, monitor Cr  renal on the case.   Try to hold off IVF unless truly necessary, as she is third-spacing everything  s/p lund to alleviate retention component  Encourage PO intake.   s/p IV Lasix. Cr slightly coming down. proBNP stable around 1400s.   renal f/u appreciated  (Pharmacist)  adamant about decreasing Lasix to 20 mg qdaily (given she is not eating/drinking)
likely cardiorenal. intravascular depleted but grossly volume overloaded.   poor PO intake.   daily BMP, monitor Cr  renal on the case.   Try to hold off IVF unless truly necessary, as she is third-spacing everything  s/p lund to alleviate retention component  Encourage PO intake.   s/p IV Lasix. Cr slightly coming down. proBNP stable around 1400s.   renal f/u appreciated  (Pharmacist)  initially adamant about decreasing Lasix to 20 mg qdaily (given she is not eating/drinking), now agreeable with BID dosing
likely cardiorenal. intravascular depleted but grossly volume overloaded.   poor PO intake.   daily BMP, monitor Cr  renal on the case.   Try to hold off IVF unless truly necessary, as she is third-spacing everything  s/p lund to alleviate retention component  Encourage PO intake.   s/p IV Lasix. Cr slightly coming down. proBNP stable around 1400s.   renal f/u appreciated  (Pharmacist)  initially adamant about decreasing Lasix to 20 mg qdaily (given she is not eating/drinking), now agreeable with bID dosing
pre-renal vs. cardiorenal. intravascular depleted but grossly volume overloaded.   poor PO intake.  daily BMP, monitor Cr  renal on the case.   Try to hold off IVF unless truly necessary, as she is third-spacing everything  s/p lund to alleviate retention component  Encourage PO intake.   will consider Lasix if needed. proBNP stable around 1400s.   renal f/u
pt on Atovastatin 10 mg QHS
pt on Atovastatin 10 mg QHS per the son
Likely pre-renal from poor PO intake.  Gentle hydration prn  daily BMP
Likely pre-renal from poor PO intake.  daily BMP  Try to hold off IVF unless truly necessary, as she is third-spacing everything
diuretics have been stopped   will monitor for now
pt on Atovastatin 10 mg QHS
pre-renal vs. cardiorenal. intravascular depleted but grossly volume overloaded.   poor PO intake.  daily BMP, monitor Cr  renal on the case.   Try to hold off IVF unless truly necessary, as she is third-spacing everything  s/p lund to alleviate retention component  Encourage PO intake.   will consider Lasix if needed. proBNP stable around 1400s.   renal f/u
likely 2/2 metabolic encephalopathy given fever and concern for cellulitis  mental status stable  Patient also on narcotics but stable on current regimen.   c/w oxycodone to q6hr PRN.  Per the son, she gets q4hr sometime at home if pain is severe.  Her encephalopathy is due to sepsis this time so okay to continue.   she is on home gabapentin 300 mg BID, will need to renal dose if Cr gets worse.  given lethargy will dec dose to 100 mg BID, now more awake and alert. May need to increase to 200 mg if pain increases.
likely 2/2 metabolic encephalopathy given fever and concern for cellulitis  mental status stable  Patient also on narcotics but stable on current regimen.   c/w oxycodone to q6hr PRN.  Per the son, she gets q4hr sometime at home if pain is severe.  Her encephalopathy is due to sepsis this time so okay to continue.   she is on home gabapentin 300 mg BID, will need to renal dose if Cr gets worse.  given lethargy will dec dose to 100 mg BID, now more awake and alert. May need to increase to 200 mg if pain increases.

## 2019-03-11 NOTE — PROGRESS NOTE ADULT - SUBJECTIVE AND OBJECTIVE BOX
low-grade temp 100 earlier this morning  moderate sized left pleural effusion but I do not see any discrete infiltrate/consolidation in the rt upper/middle or left upper lobes; small rt effusion as well    Vital Signs Last 24 Hrs  T(C): 37.8 (11 Mar 2019 08:13), Max: 37.8 (11 Mar 2019 08:13)  T(F): 100 (11 Mar 2019 08:13), Max: 100 (11 Mar 2019 08:13)  HR: 108 (11 Mar 2019 08:13) (93 - 108)  BP: 107/82 (11 Mar 2019 08:13) (105/67 - 117/76)  BP(mean): --  RR: 18 (11 Mar 2019 08:13) (18 - 18)  SpO2: 97% (11 Mar 2019 08:13) (97% - 97%)    GENERAL: NAD, lying in bed, awake alert, morbidly obese  HEAD:  Atraumatic, Normocephalic  EYES: EOMI, PERRLA, conjunctiva and sclera clear  NECK: Supple, No JVD  CHEST/LUNG: mild decrease breath sounds bilaterally; No wheeze   HEART: Regular rate and rhythm; No murmurs, rubs, or gallops  ABDOMEN: Soft, Nontender, Nondistended; Bowel sounds present  Neuro: AAO x 2, no focal deficit  EXTREMITIES:  2+ Peripheral Pulses, No clubbing, cyanosis.  2+ nonpitting edema LE, UE 2-3+ edema  Back: sacral and thigh wound. +wound vac in place  SKIN: No rashes or lesions    LABS:                        8.2    32.1  )-----------( 337      ( 11 Mar 2019 07:10 )             26.5     03-11    149<H>  |  111<H>  |  42<H>  ----------------------------<  67<L>  4.8   |  25  |  1.87<H>    Ca    8.7      11 Mar 2019 07:08        CAPILLARY BLOOD GLUCOSE

## 2019-03-11 NOTE — PROGRESS NOTE ADULT - PROBLEM SELECTOR PLAN 4
likely 2/2 dec free h20 intake and diuretics  encourage po intake  monitor off diuretics for now
likely cardiorenal. intravascular depleted but grossly volume overloaded.   poor PO intake.   daily BMP, monitor Cr  renal on the case.   Try to hold off IVF unless truly necessary, as she is third-spacing everything  s/p lund to alleviate retention component  Encourage PO intake.   s/p IV Lasix. Cr slightly coming down. proBNP stable around 1400s.   renal f/u appreciated  (Pharmacist)  initially adamant about decreasing Lasix to 20 mg qdaily (given she is not eating/drinking), now agreeable with BID dosing
likely 2/2 dec free h20 intake and diuretics  encourage po intake  monitor off diuretics for now
pt on Atovastatin 10 mg QHS
Pt with severe AS, need to be cautious with fluids.  At this time appears very dehydrated due to lack of po intake and lab work supports this.  c/w gentle IVF  this am Cr pending
Pt with severe AS, need to be cautious with fluids.  At this time appears very dehydrated due to lack of po intake and lab work supports this.  s/p gentle IVF, Cr normalized.
Pt with severe AS, need to be cautious with fluids.  At this time appears very dehydrated due to lack of po intake and labwork supports this.  c/w gentle IVF with 1L @ 90ml/hr for now
Pt with severe AS, need to be cautious with fluids.  very dehydrated on admission due to lack of po intake and lab work supports this.  s/p gentle IVF, Cr normalized.  d/c IVF. continue to monitor closely.
Pt with severe AS, need to be cautious with fluids.  very dehydrated on admission due to lack of po intake and lab work supports this.  s/p gentle IVF, Cr normalized.  d/c IVF. continue to monitor closely.  encourage PO intake
likely 2/2 dec free h20 intake and diuretics  encourage po intake  Please start D5W @ 100cc/hr  Consider reeval of GOC
likely 2/2 metabolic encephalopathy given fever and concern for cellulitis  mental status stable  Patient also on narcotics but stable on current regimen.   c/w oxycodone to q6hr PRN.  Per the son, she gets q4hr sometime at home if pain is severe.  Her encephalopathy is due to sepsis this time so okay to continue.   she is on home gabapentin 300 mg BID, will need to renal dose if Cr gets worse.  given lethargy will dec dose to 100 mg BID, now more awake and alert. May need to increase to 200 mg if pain increases.
pt on Atovastatin 10 mg QHS
Pt with severe AS, need to be cautious with fluids.  very dehydrated on admission due to lack of po intake and lab work supports this.  s/p gentle IVF, Cr normalized.  d/c IVF. continue to monitor closely.  encourage PO intake
pt on Atovastatin 10 mg QHS
likely cardiorenal. intravascular depleted but grossly volume overloaded.   poor PO intake.   daily BMP, monitor Cr  renal on the case.   Try to hold off IVF unless truly necessary, as she is third-spacing everything  s/p lund to alleviate retention component  Encourage PO intake.   s/p IV Lasix. Cr slightly coming down. proBNP stable around 1400s.   renal f/u appreciated  (Pharmacist)  initially adamant about decreasing Lasix to 20 mg qdaily (given she is not eating/drinking), now agreeable with BID dosing

## 2019-03-11 NOTE — PROGRESS NOTE ADULT - PROBLEM SELECTOR PLAN 5
pt on Atovastatin 10 mg QHS
pt on Atovastatin 10 mg QHS
Pt with severe AS, need to be cautious with fluids.  very dehydrated on admission due to lack of po intake, now overloaded.   continue to monitor closely.  diuretics as above
Protonix
Pt with severe AS, need to be cautious with fluids.  very dehydrated on admission due to lack of po intake   continue to monitor closely.
Pt with severe AS, need to be cautious with fluids.  very dehydrated on admission due to lack of po intake   s/p gentle IVF, Cr stable.   continue to monitor closely.  encourage PO intake, will need gentle IVF if she is not eating much.
Pt with severe AS, need to be cautious with fluids.  very dehydrated on admission due to lack of po intake, now overloaded.   continue to monitor closely.
Pt with severe AS, need to be cautious with fluids.  very dehydrated on admission due to lack of po intake, now overloaded.   continue to monitor closely.
Pt with severe AS, need to be cautious with fluids.  very dehydrated on admission due to lack of po intake, now overloaded.   continue to monitor closely.  diuretics as above
likely cardiorenal. intravascular depleted but grossly volume overloaded.   poor PO intake.   daily BMP, monitor Cr  renal on the case.   Try to hold off IVF unless truly necessary, as she is third-spacing everything  s/p lund to alleviate retention component  Encourage PO intake.   s/p IV Lasix. Cr slightly coming down. proBNP stable around 1400s.   renal f/u appreciated  (Pharmacist)  initially adamant about decreasing Lasix to 20 mg qdaily (given she is not eating/drinking), now agreeable with BID dosing
likely cardiorenal. intravascular depleted but grossly volume overloaded.   poor PO intake.   renal appreciated  Holding off diuretics for now  s/p lund to alleviate retention component
likely cardiorenal. intravascular depleted but grossly volume overloaded.   poor PO intake.   renal appreciated  Holding off diuretics for now  s/p lund to alleviate retention component
Pt with severe AS, need to be cautious with fluids.  very dehydrated on admission due to lack of po intake   continue to monitor closely.
Pt with severe AS, need to be cautious with fluids.  very dehydrated on admission due to lack of po intake   continue to monitor closely.
Protonix
Pt with severe AS, need to be cautious with fluids.  very dehydrated on admission due to lack of po intake, now overloaded.   continue to monitor closely.

## 2019-03-11 NOTE — PROGRESS NOTE ADULT - PROBLEM SELECTOR PLAN 6
Protonix
Pt with severe AS, need to be cautious with fluids.  very dehydrated on admission due to lack of po intake, now overloaded.   continue to monitor closely.  diuretics as above
Pt with severe AS, need to be cautious with fluids.  very dehydrated on admission due to lack of po intake, now overloaded.   continue to monitor closely.  diuretics as above
Protonix
Cont Atovastatin 10 mg QHS
Cont Atovastatin 10 mg QHS
Need to confirm medications with the   Unclear if patient is still on xarelto at this time and unclear what dose of prednisone.
Protonix
med reconciled. see chart note from 2/13.
pt on Atovastatin 10 mg QHS
Protonix
Protonix
med reconciled. see chart note from 2/13.

## 2019-03-11 NOTE — PROGRESS NOTE ADULT - PROBLEM SELECTOR PLAN 8
psyc consult requested by the spouse Chino  c/w low dose Lexapro 5 mg.
med reconciled
med reconciled
psyc consult requested by the spouse Chino  c/w low dose Lexapro 5 mg.
Protonix
psyc consult requested by the spouse Chino  c/w low dose Lexapro 5 mg.

## 2019-03-11 NOTE — PROGRESS NOTE ADULT - PROBLEM SELECTOR PROBLEM 2
Altered mental status, unspecified altered mental status type
Hyperkalemia
Hyperkalemia
Altered mental status, unspecified altered mental status type
Hyperkalemia
Iron deficiency anemia
Altered mental status, unspecified altered mental status type
Altered mental status, unspecified altered mental status type
Hyperkalemia
Altered mental status, unspecified altered mental status type
Hyperkalemia
Altered mental status, unspecified altered mental status type
Cellulitis of leg, right
Cellulitis of leg, right

## 2019-03-11 NOTE — PROGRESS NOTE ADULT - PROBLEM SELECTOR PROBLEM 5
Coronary artery disease involving native coronary artery of native heart without angina pectoris
Coronary artery disease involving native coronary artery of native heart without angina pectoris
Aortic stenosis, severe
Acute kidney failure
Aortic stenosis, severe
Gastroesophageal reflux disease, esophagitis presence not specified
Aortic stenosis, severe
Aortic stenosis, severe
Gastroesophageal reflux disease, esophagitis presence not specified
Aortic stenosis, severe

## 2019-03-11 NOTE — PROGRESS NOTE ADULT - PROBLEM SELECTOR PLAN 1
2' S epi  Daptomycin last day is 3/28/19; PICC placed 3/6/19  blood cultures from 2/28, 3/9 are negative.  ID f/u appreciated  TTE without discrete vegetation  Too high-risk for ERIKA  F/u offical chest CT read  WBC increasing and now with 10% bands, ?trial of IV steroids? for issue #3?

## 2019-03-11 NOTE — PROGRESS NOTE ADULT - PROBLEM SELECTOR PLAN 2
improved k level  trend k
likely 2/2 metabolic encephalopathy given fever and concern for cellulitis  mental status stable  Patient also on narcotics but stable on current regimen.   c/w oxycodone to q6hr PRN.  Per the son, she gets q4hr sometime at home if pain is severe.  Her encephalopathy is due to sepsis this time so okay to continue.   she is on home gabapentin 300 mg BID, will need to renal dose if Cr gets worse.  given lethargy will dec dose to 100 mg BID, now more awake and alert. May need to increase to 200 mg if pain increases.
Continue NaBicarb 650mg PO TID / metabolic acidosis improved  lasix helping  Low K+, puree diet
I took the liberty of starting PO NaBicarb 650mg PO TID  lasix also will aid in hyperkaelmia  Treat medically for now  Low K+, puree diet
Improved  Manage medically  Daily BMP
improved k level  Can restart Bicarb at 650mg po BID only
improved k level  acidosis improved, stop sodium bicarb
improved k level  acidosis improved, stop sodium bicarb
improved k level  trend k
in setting of CK3  s/p venofer 200 mg on 3/7 and 3/8  s/p 1 unit of pRBC on 3/9
in setting of CK3  s/p venofer 200 mg on 3/7 and 3/8  s/p 1 unit of pRBC on 3/9  daily CBC
in setting of CK3  s/p venofer 200 mg on 3/7 and 3/8  s/p 1 unit of pRBC on 3/9  daily CBC
likely 2/2 metabolic encephalopathy given fever and concern for cellulitis  Treat cellulitis, reorient patient.  Patient also on narcotics and per orthopedic surgery who is familiar with patient she is sensitive to excessive narcotics. Pt in a lot of pain with oxycodone to q8h PRN only for now. She has always been on q6hr. Will switch back.  Her encephalopathy is due to sepsis this time.
likely 2/2 metabolic encephalopathy given fever and concern for cellulitis  Treat cellulitis, reorient patient.  Patient also on narcotics and per orthopedic surgery who is familiar with patient she is sensitive to excessive narcotics. Pt in a lot of pain with oxycodone to q8h PRN only for now. She has always been on q6hr. Will switch back.  Per the son, she gets q4hr sometime at home if pain is severe.  Her encephalopathy is due to sepsis this time.  resume home gabapentin 300 mg BID
likely 2/2 metabolic encephalopathy given fever and concern for cellulitis  mental status back to baseline.   Patient also on narcotics but stable on current regimen.   c/w oxycodone to q6hr PRN.  Per the son, she gets q4hr sometime at home if pain is severe.  Her encephalopathy is due to sepsis this time.   c/w home gabapentin 300 mg BID. pain is much better control.
likely 2/2 metabolic encephalopathy given fever and concern for cellulitis  mental status now back to baseline.   Patient also on narcotics but stable on current regimen.   c/w oxycodone to q6hr PRN.  Per the son, she gets q4hr sometime at home if pain is severe.  Her encephalopathy is due to sepsis this time so okay to continue.   c/w home gabapentin 300 mg BID
likely 2/2 metabolic encephalopathy given fever and concern for cellulitis  mental status now back to baseline.   Patient also on narcotics but stable on current regimen.   c/w oxycodone to q6hr PRN.  Per the son, she gets q4hr sometime at home if pain is severe.  Her encephalopathy is due to sepsis this time so okay to continue.   c/w home gabapentin 300 mg BID
likely 2/2 metabolic encephalopathy given fever and concern for cellulitis  mental status now back to baseline.   Patient also on narcotics but stable on current regimen.   c/w oxycodone to q6hr PRN.  Per the son, she gets q4hr sometime at home if pain is severe.  Her encephalopathy is due to sepsis this time so okay to continue.   c/w home gabapentin 300 mg BID. pain is much better control.
likely 2/2 metabolic encephalopathy given fever and concern for cellulitis  mental status stable  Patient also on narcotics but stable on current regimen.   c/w oxycodone to q6hr PRN.  Per the son, she gets q4hr sometime at home if pain is severe.  Her encephalopathy is due to sepsis this time so okay to continue.   c/w home gabapentin 300 mg BID, will need to renal dose if Cr gets worse.
likely 2/2 metabolic encephalopathy given fever and concern for cellulitis  mental status stable  Patient also on narcotics but stable on current regimen.   c/w oxycodone to q6hr PRN.  Per the son, she gets q4hr sometime at home if pain is severe.  Her encephalopathy is due to sepsis this time so okay to continue.   she is on home gabapentin 300 mg BID, will need to renal dose if Cr gets worse.  given lethargy will dec dose to 100 mg BID
likely 2/2 metabolic encephalopathy given fever and concern for cellulitis  mental status stable  Patient also on narcotics but stable on current regimen.   c/w oxycodone to q6hr PRN.  Per the son, she gets q4hr sometime at home if pain is severe.  Her encephalopathy is due to sepsis this time so okay to continue.   she is on home gabapentin 300 mg BID, will need to renal dose if Cr gets worse.  given lethargy will dec dose to 100 mg BID, now more awake and alert.
likely 2/2 metabolic encephalopathy given fever and concern for cellulitis  mental status stable  Patient also on narcotics but stable on current regimen.   c/w oxycodone to q6hr PRN.  Per the son, she gets q4hr sometime at home if pain is severe.  Her encephalopathy is due to sepsis this time so okay to continue.   she is on home gabapentin 300 mg BID, will need to renal dose if Cr gets worse.  given lethargy will dec dose to 100 mg BID, now more awake and alert. May need to increase to 200 mg if pain increases.
likely 2/2 metabolic encephalopathy given fever and concern for cellulitis  mental status now back to baseline.   Patient also on narcotics but stable on current regimen.   c/w oxycodone to q6hr PRN.  Per the son, she gets q4hr sometime at home if pain is severe.  Her encephalopathy is due to sepsis this time so okay to continue.   c/w home gabapentin 300 mg BID
likely 2/2 metabolic encephalopathy given fever and concern for cellulitis  mental status now back to baseline.   Patient also on narcotics but stable on current regimen.   c/w oxycodone to q6hr PRN.  Per the son, she gets q4hr sometime at home if pain is severe.  Her encephalopathy is due to sepsis this time so okay to continue.   c/w home gabapentin 300 mg BID. pain is much better control.
improved k level  trend k
likely 2/2 metabolic encephalopathy given fever and concern for cellulitis  mental status back to baseline.   Patient also on narcotics but stable on current regimen.   c/w oxycodone to q6hr PRN.  Per the son, she gets q4hr sometime at home if pain is severe.  Her encephalopathy is due to sepsis this time.   c/w home gabapentin 300 mg BID. pain is much better control.
likely 2/2 metabolic encephalopathy given fever and concern for cellulitis  mental status stable  Patient also on narcotics but stable on current regimen.   c/w oxycodone to q6hr PRN.  Per the son, she gets q4hr sometime at home if pain is severe.  Her encephalopathy is due to sepsis this time so okay to continue.   c/w home gabapentin 300 mg BID, will need to renal dose if Cr gets worse.
c/w vancomycin, q48hrs, renal dose. monitor vanc trough.   Total 4-6 weeks via PICC.  blood cultures from 2/28 are negative.  ID f/u appreciated  cyclosporine temporarily held on admission, but later restarted.   Cyclosporine dose adjusted to 100 mg BID based on levels  completed chronic steroids taper for pyoderma.   (she was on Prednisone 5 mg qdaily)  Per orthopedic surgery can be OOB primarily to wheelchair and can 50% weight bear on R leg if straight in immobilizer.  wound care consult and wound vac management appreciated.  LE edema likely due to IVF. On lasix 20 mg IV bid  TTE without discrete vegetation  ERIKA pending but likely will not change fact that she will need PICC for 4-6 week of antibx. will not do ERIKA given risk.   Plan for PICC line. renal clearance appreciated.
c/w vancomycin, q48hrs, renal dose. monitor vanc trough.   Total 4-6 weeks via PICC.  blood cultures from 2/28 are negative.  ID f/u appreciated  cyclosporine temporarily held on admission, but later restarted.   Cyclosporine dose adjusted to 100 mg BID based on levels  completed chronic steroids taper for pyoderma.   (she was on Prednisone 5 mg qdaily)  Per orthopedic surgery can be OOB primarily to wheelchair and can 50% weight bear on R leg if straight in immobilizer.  wound care consult and wound vac management appreciated.  LE edema likely due to IVF. On lasix 20 mg IV bid  TTE without discrete vegetation  ERIKA pending but likely will not change fact that she will need PICC for 4-6 week of antibx. will not do ERIKA given risk.   Plan for PICC line. renal clearance appreciated.

## 2019-03-11 NOTE — PROGRESS NOTE ADULT - PROBLEM SELECTOR PLAN 9
psyc consult requested by the spouse Chino  c/w low dose Lexapro 5 mg.
psyc consult requested by the spouse Chino  c/w low dose Lexapro 5 mg.
med reconciled
med reconciled
psyc consult requested by the spouse Chino  c/w low dose Lexapro 5 mg.

## 2019-03-11 NOTE — PROVIDER CONTACT NOTE (OTHER) - BACKGROUND
Pt. with multiple wounds- admitted with sepsis- scored a mews 7- ID involved he will be putting pt. on meropenem

## 2019-03-11 NOTE — PROGRESS NOTE ADULT - PROBLEM SELECTOR PROBLEM 4
Acute kidney failure
Hypernatremia
Coronary artery disease involving native coronary artery of native heart without angina pectoris
Altered mental status, unspecified altered mental status type
Aortic stenosis, severe
Coronary artery disease involving native coronary artery of native heart without angina pectoris
Hypernatremia
Coronary artery disease involving native coronary artery of native heart without angina pectoris
Coronary artery disease involving native coronary artery of native heart without angina pectoris
Hypernatremia
Aortic stenosis, severe
Coronary artery disease involving native coronary artery of native heart without angina pectoris
Acute kidney failure

## 2019-03-11 NOTE — PROGRESS NOTE ADULT - PROBLEM SELECTOR PROBLEM 9
Depression, unspecified depression type
Medication management
Medication management

## 2019-03-11 NOTE — PROGRESS NOTE ADULT - SUBJECTIVE AND OBJECTIVE BOX
[facility] - KIRTI Castillo / KIRTI Gracia / RITA Daniels/ KIRTI Zamudio/ KIRTI Irizarry/ LOLLY Hays / CLINT Forbes / LEBRON Boss  ---------------------------------------------------------------------------------------------------------------  seen and examined today for YONATAN and hypernatremia  Interval : Minimal PO intake, cannot tolerate solids or Liquids  VITALS:  T(F): 100 (03-11-19 @ 08:13), Max: 100 (03-11-19 @ 08:13)  HR: 108 (03-11-19 @ 08:13)  BP: 107/82 (03-11-19 @ 08:13)  RR: 18 (03-11-19 @ 08:13)  SpO2: 97% (03-11-19 @ 08:13)  Wt(kg): --    03-10 @ 07:01  -  03-11 @ 07:00  --------------------------------------------------------  IN: 640 mL / OUT: 650 mL / NET: -10 mL    03-11 @ 07:01  -  03-11 @ 11:04  --------------------------------------------------------  IN: 120 mL / OUT: 0 mL / NET: 120 mL      Physical Exam :-  Constitutional: NAD  Neck: Supple.  Respiratory: Bilateral equal breath sounds,  Cardiovascular: S1, S2 normal,  Gastrointestinal: Bowel Sounds present, soft, non tender.  Extremities: Trace edema  Neurological: Confused    Data:-  Allergies :   amoxicillin (Other)  IV Contrast (Flushing (Skin); Nausea)    Hospital Medications:   MEDICATIONS  (STANDING):  ascorbic acid 500 milliGRAM(s) Oral daily  aspirin enteric coated 81 milliGRAM(s) Oral daily  atorvastatin 10 milliGRAM(s) Oral at bedtime  bisacodyl 5 milliGRAM(s) Oral at bedtime  calcium carbonate 1250 mG  + Vitamin D (OsCal 500 + D) 1 Tablet(s) Oral three times a day  cholecalciferol 1000 Unit(s) Oral daily  cycloSPORINE  (SandIMMUNE)  Solution 75 milliGRAM(s) Oral two times a day  Dakins Solution - 1/4 Strength 1 Application(s) Topical two times a day  DAPTOmycin IVPB 700 milliGRAM(s) IV Intermittent every 24 hours  dextrose 5%. 1000 milliLiter(s) (100 mL/Hr) IV Continuous <Continuous>  escitalopram 5 milliGRAM(s) Oral daily  folic acid 1 milliGRAM(s) Oral daily  gabapentin 100 milliGRAM(s) Oral two times a day  heparin  Injectable 5000 Unit(s) SubCutaneous every 8 hours  metoprolol tartrate 12.5 milliGRAM(s) Oral two times a day  nystatin    Suspension 989204 Unit(s) Swish and Swallow three times a day  polyethylene glycol 3350 17 Gram(s) Oral daily  vitamin B complex with vitamin C 1 Tablet(s) Oral daily    03-11    149<H>  |  111<H>  |  42<H>  ----------------------------<  67<L>  4.8   |  25  |  1.87<H>    Ca    8.7      11 Mar 2019 07:08      Creatinine Trend: 1.87 <--, 1.73 <--, 1.79 <--, 2.00 <--, 1.96 <--, 1.65 <--, 1.58 <--                        8.2    32.1  )-----------( 337      ( 11 Mar 2019 07:10 )             26.5

## 2019-03-11 NOTE — PROGRESS NOTE ADULT - ASSESSMENT
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  F/u blood cultures 2/14 are negative  New L LE DVT noted (IVC filter already in place)  Leukocytosis noted, has been rising over past week, now also with 10% bands  Suggest:  will continue dapto 6mg/kg 24  anticipate a 6  wk course via PICC, day 25/42   If creatinine cont to rise will change dapto to q48  Local wound care  f/u repeat blood cult  CT chest reviewed  with rising WBC and bandemia, will broaden coverage for now with Meropenem in addition to Dapto  prognosis seems poor  Goals of care need to be addressed  >35 min spent assessing pt conditions, labs, micro and radiology reports, d/w pt and family

## 2019-03-11 NOTE — PROGRESS NOTE ADULT - PROBLEM SELECTOR PLAN 7
psyc consult requested by the spouse Chino  c/w low dose Lexapro 5 mg.
med reconciled
Protonix
Protonix
med reconciled
Pt with depression, unclear if she is still on antidepressants. Currently also confused likely due to metabolic encephalopathy so patient not cooperative/reliable with psych exam for depression. Appears to have been on multiple SSRIs in the past.
Pt with severe AS, need to be cautious with fluids.
Pt with severe AS, need to be cautious with fluids.
Pt with severe AS, need to be cautious with fluids.  very dehydrated on admission due to lack of po intake, now overloaded.   continue to monitor closely.  diuretics as above
med reconciled
med reconciled. see chart note from 2/13.
psyc consult requested by the spouse Chino
psyc consult requested by the spouse Chino  c/w low dose Lexapro 5 mg.
psyc consult requested by the spouse Chino  started on low dose Lexapro 5 mg.
psyc consult requested by the spouse Chino  started on low dose Lexapro 5 mg.
med reconciled
med reconciled. see chart note from 2/13.

## 2019-03-11 NOTE — PROGRESS NOTE ADULT - PROBLEM SELECTOR PROBLEM 1
Acute kidney injury superimposed on CKD
Bacteremia
Cellulitis of leg, right
Acute kidney injury superimposed on CKD
Cellulitis of leg, right
Acute kidney injury superimposed on CKD
Cellulitis of leg, right
Iron deficiency anemia
Iron deficiency anemia
Cellulitis of leg, right

## 2019-03-11 NOTE — PROGRESS NOTE ADULT - PROBLEM SELECTOR PLAN 1
cr relatively stable  would avoid iv contrast as pt is high risk for argelia and possibility of needing HD  off diuretics now- edema much improved  will monitor  avoid nephrotoxins

## 2019-03-11 NOTE — PROGRESS NOTE ADULT - PROBLEM SELECTOR PROBLEM 6
Gastroesophageal reflux disease, esophagitis presence not specified
Aortic stenosis, severe
Aortic stenosis, severe
Gastroesophageal reflux disease, esophagitis presence not specified
Coronary artery disease involving native coronary artery of native heart without angina pectoris
Gastroesophageal reflux disease, esophagitis presence not specified
Medication management
Gastroesophageal reflux disease, esophagitis presence not specified
Gastroesophageal reflux disease, esophagitis presence not specified
Medication management

## 2019-03-11 NOTE — PROGRESS NOTE ADULT - SUBJECTIVE AND OBJECTIVE BOX
CC: f/u for coag negative staph bacteremia    Patient had low grade fever, remains very weak and uncomfortable due to pain, poor oral intake    REVIEW OF SYSTEMS:  All other review of systems negative (Comprehensive ROS)    Antimicrobials Day #  :25/42  DAPTOmycin IVPB 700 milliGRAM(s) IV Intermittent every 24 hours  nystatin    Suspension [id number] Unit(s) Swish and Swallow three times a day    Other Medications Reviewed  MEDICATIONS  (STANDING):  ascorbic acid 500 milliGRAM(s) Oral daily  aspirin enteric coated 81 milliGRAM(s) Oral daily  atorvastatin 10 milliGRAM(s) Oral at bedtime  bisacodyl 5 milliGRAM(s) Oral at bedtime  calcium carbonate 1250 mG  + Vitamin D (OsCal 500 + D) 1 Tablet(s) Oral three times a day  cholecalciferol 1000 Unit(s) Oral daily  cycloSPORINE  (SandIMMUNE)  Solution 75 milliGRAM(s) Oral two times a day  Dakins Solution - 1/4 Strength 1 Application(s) Topical two times a day  DAPTOmycin IVPB 700 milliGRAM(s) IV Intermittent every 24 hours  dextrose 5%. 1000 milliLiter(s) (100 mL/Hr) IV Continuous <Continuous>  escitalopram 5 milliGRAM(s) Oral daily  folic acid 1 milliGRAM(s) Oral daily  gabapentin 100 milliGRAM(s) Oral two times a day  heparin  Injectable 5000 Unit(s) SubCutaneous every 8 hours  metoprolol tartrate 12.5 milliGRAM(s) Oral two times a day  nystatin    Suspension [id number] Unit(s) Swish and Swallow three times a day  polyethylene glycol 3350 17 Gram(s) Oral daily  vitamin B complex with vitamin C 1 Tablet(s) Oral daily    Vital Signs Last 24 Hrs  T(C): 37.8 (11 Mar 2019 08:13), Max: 37.8 (11 Mar 2019 08:13)  T(F): 100 (11 Mar 2019 08:13), Max: 100 (11 Mar 2019 08:13)  HR: 108 (11 Mar 2019 08:13) (93 - 108)  BP: 107/82 (11 Mar 2019 08:13) (105/67 - 117/76)  BP(mean): --  RR: 18 (11 Mar 2019 08:13) (18 - 18)  SpO2: 97% (11 Mar 2019 08:13) (97% - 97%)    PHYSICAL EXAM:  General: alert, no acute distress, morbidly obese  Eyes:  anicteric, no conjunctival injection, no discharge  Oropharynx: no lesions or injection 	  Neck: supple, without adenopathy  Lungs: clear to auscultation  Heart: regular rate and rhythm; no murmur, rubs or gallops  Abdomen: soft, nondistended, nontender, without mass or organomegaly  Skin: flank and back wounds dressed  Extremities: no clubbing, cyanosis, or edema  Neurologic: alert, oriented, moves all extremities      LAB RESULTS:                                   8.2    32.1  )-----------( 337      ( 11 Mar 2019 07:10 )             26.5   03-11    149<H>  |  111<H>  |  42<H>  ----------------------------<  67<L>  4.8   |  25  |  1.87<H>    Ca    8.7      11 Mar 2019 07:08              MICROBIOLOGY:  RECENT CULTURES:  Culture - Blood (03.09.19 @ 16:44)    Specimen Source: .Blood Blood-Venous    Culture Results:   No growth to date.        RADIOLOGY REVIEWED:    < from: CT Chest No Cont (03.10.19 @ 20:01) >    1.  There are bilateral pleural effusions.  2.  Passive atelectasis from bilateral pleural effusions.    < end of copied text >

## 2019-03-12 LAB
ANION GAP SERPL CALC-SCNC: 12 MMOL/L — SIGNIFICANT CHANGE UP (ref 5–17)
BUN SERPL-MCNC: 42 MG/DL — HIGH (ref 7–23)
CALCIUM SERPL-MCNC: 8.1 MG/DL — LOW (ref 8.4–10.5)
CHLORIDE SERPL-SCNC: 108 MMOL/L — SIGNIFICANT CHANGE UP (ref 96–108)
CO2 SERPL-SCNC: 25 MMOL/L — SIGNIFICANT CHANGE UP (ref 22–31)
CREAT SERPL-MCNC: 2.07 MG/DL — HIGH (ref 0.5–1.3)
CYCLOSPORINE SER-MCNC: 211 NG/ML — SIGNIFICANT CHANGE UP (ref 150–400)
GLUCOSE SERPL-MCNC: 119 MG/DL — HIGH (ref 70–99)
HCT VFR BLD CALC: 22.7 % — LOW (ref 34.5–45)
HGB BLD-MCNC: 7.2 G/DL — LOW (ref 11.5–15.5)
MCHC RBC-ENTMCNC: 27.8 PG — SIGNIFICANT CHANGE UP (ref 27–34)
MCHC RBC-ENTMCNC: 31.6 GM/DL — LOW (ref 32–36)
MCV RBC AUTO: 87.9 FL — SIGNIFICANT CHANGE UP (ref 80–100)
PLATELET # BLD AUTO: 252 K/UL — SIGNIFICANT CHANGE UP (ref 150–400)
POTASSIUM SERPL-MCNC: 4.4 MMOL/L — SIGNIFICANT CHANGE UP (ref 3.5–5.3)
POTASSIUM SERPL-SCNC: 4.4 MMOL/L — SIGNIFICANT CHANGE UP (ref 3.5–5.3)
RBC # BLD: 2.58 M/UL — LOW (ref 3.8–5.2)
RBC # FLD: 18.1 % — HIGH (ref 10.3–14.5)
SODIUM SERPL-SCNC: 145 MMOL/L — SIGNIFICANT CHANGE UP (ref 135–145)
WBC # BLD: 25 K/UL — HIGH (ref 3.8–10.5)
WBC # FLD AUTO: 25 K/UL — HIGH (ref 3.8–10.5)

## 2019-03-12 PROCEDURE — 99223 1ST HOSP IP/OBS HIGH 75: CPT

## 2019-03-12 RX ADMIN — Medication 1 TABLET(S): at 14:09

## 2019-03-12 RX ADMIN — Medication 5 MILLIGRAM(S): at 20:46

## 2019-03-12 RX ADMIN — SODIUM CHLORIDE 100 MILLILITER(S): 9 INJECTION, SOLUTION INTRAVENOUS at 09:18

## 2019-03-12 RX ADMIN — CYCLOSPORINE 75 MILLIGRAM(S): 100 CAPSULE ORAL at 20:57

## 2019-03-12 RX ADMIN — HEPARIN SODIUM 5000 UNIT(S): 5000 INJECTION INTRAVENOUS; SUBCUTANEOUS at 05:56

## 2019-03-12 RX ADMIN — HEPARIN SODIUM 5000 UNIT(S): 5000 INJECTION INTRAVENOUS; SUBCUTANEOUS at 14:10

## 2019-03-12 RX ADMIN — MEROPENEM 100 MILLIGRAM(S): 1 INJECTION INTRAVENOUS at 19:31

## 2019-03-12 RX ADMIN — Medication 1 MILLIGRAM(S): at 14:10

## 2019-03-12 RX ADMIN — MEROPENEM 100 MILLIGRAM(S): 1 INJECTION INTRAVENOUS at 05:57

## 2019-03-12 RX ADMIN — Medication 500000 UNIT(S): at 20:47

## 2019-03-12 RX ADMIN — Medication 1000 UNIT(S): at 14:11

## 2019-03-12 RX ADMIN — DAPTOMYCIN 128 MILLIGRAM(S): 500 INJECTION, POWDER, LYOPHILIZED, FOR SOLUTION INTRAVENOUS at 20:48

## 2019-03-12 RX ADMIN — ATORVASTATIN CALCIUM 10 MILLIGRAM(S): 80 TABLET, FILM COATED ORAL at 20:46

## 2019-03-12 RX ADMIN — GABAPENTIN 100 MILLIGRAM(S): 400 CAPSULE ORAL at 05:56

## 2019-03-12 RX ADMIN — Medication 500000 UNIT(S): at 05:57

## 2019-03-12 RX ADMIN — Medication 81 MILLIGRAM(S): at 14:11

## 2019-03-12 RX ADMIN — Medication 1 APPLICATION(S): at 08:12

## 2019-03-12 RX ADMIN — Medication 500 MILLIGRAM(S): at 14:09

## 2019-03-12 RX ADMIN — Medication 500000 UNIT(S): at 14:10

## 2019-03-12 RX ADMIN — Medication 650 MILLIGRAM(S): at 19:25

## 2019-03-12 RX ADMIN — CYCLOSPORINE 75 MILLIGRAM(S): 100 CAPSULE ORAL at 09:58

## 2019-03-12 RX ADMIN — Medication 1 TABLET(S): at 20:45

## 2019-03-12 RX ADMIN — Medication 12.5 MILLIGRAM(S): at 05:56

## 2019-03-12 RX ADMIN — Medication 1 APPLICATION(S): at 22:45

## 2019-03-12 RX ADMIN — ESCITALOPRAM OXALATE 5 MILLIGRAM(S): 10 TABLET, FILM COATED ORAL at 14:09

## 2019-03-12 RX ADMIN — Medication 1 TABLET(S): at 05:56

## 2019-03-12 RX ADMIN — HEPARIN SODIUM 5000 UNIT(S): 5000 INJECTION INTRAVENOUS; SUBCUTANEOUS at 20:47

## 2019-03-12 NOTE — PROGRESS NOTE ADULT - SUBJECTIVE AND OBJECTIVE BOX
CC: f/u for cns bacteremia, leukocytosis    Patient reports  nothing very lethargic  REVIEW OF SYSTEMS:  All other review of systems cannot get negative (Comprehensive ROS)    Antimicrobials Day #  :  DAPTOmycin IVPB 700 milliGRAM(s) IV Intermittent every 24 hours   26/42  meropenem  IVPB 1000 milliGRAM(s) IV Intermittent every 12 hours  2  nystatin    Suspension [id number] Unit(s) Swish and Swallow three times a day    Other Medications Reviewed    T(F): 99.2 (03-12-19 @ 16:18), Max: 99.3 (03-11-19 @ 19:31)  HR: 99 (03-12-19 @ 16:18)  BP: 117/72 (03-12-19 @ 16:18)  RR: 20 (03-12-19 @ 16:18)  SpO2: 98% (03-12-19 @ 16:18)  Wt(kg): --    PHYSICAL EXAM:  General: somnolent  no acute distress  Eyes:  anicteric, no conjunctival injection, no discharge  Oropharynx: no lesions or injection 	  Neck: supple, without adenopathy  Lungs: course  to auscultation  Heart: regular rate and rhythm; no murmur, rubs or gallops  Abdomen: soft, nondistended, nontender, without mass or organomegaly, obese  Skin: no lesions  Extremities: thighs with wounds and eschars  Neurologic: very lethargic, not moving    LAB RESULTS:                        7.2    25.0  )-----------( 252      ( 12 Mar 2019 07:35 )             22.7     03-12    145  |  108  |  42<H>  ----------------------------<  119<H>  4.4   |  25  |  2.07<H>    Ca    8.1<L>      12 Mar 2019 07:34          MICROBIOLOGY:  RECENT CULTURES:  03-09 @ 16:44 .Blood Blood-Venous     No growth to date.          RADIOLOGY REVIEWED:  < from: US Kidney and Bladder (02.27.19 @ 09:14) >    Bilateral pleural effusions    No hydronephrosis. Upper pole left renal cyst. Area of heterogeneity   lower pole left kidney seen on CT examination 11/12/2018 is not   appreciated on this study.      < end of copied text >    < from: CT Chest No Cont (03.10.19 @ 20:01) >  IMPRESSION:     1.  There are bilateral pleural effusions.  2.  Passive atelectasis from bilateral pleural effusions.    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 Surely can translocate from wounds, aspiration, uti all possible  Plan:  continue dapto and meropenem for now  follow up blood cx from a few days ago  agree with palliative evaluation  defer to renal if cyclosporin contributing to renal dysfunction  local wound care

## 2019-03-12 NOTE — PROGRESS NOTE ADULT - SUBJECTIVE AND OBJECTIVE BOX
HPI:  78F PMH of CAD s/p stent to LAD, AS s/p TAVR, PPM, DVT (Jan '18) on coumadin, IVC filter, pyoderma gangrenosum, hx MRSA infections, and total knee replacement c/b joint infections s/p I&D explantation and spacer placement followed by spacer removal and static spacer placement with joint fusion due to infection of initial spacer with complex wound closure by plastic surgery, admission here in November 2018 for hip and thigh wounds, now presenting with confusion and chills at home associated with poor appetite and confusion x 1 week and not eating x 2 days. Patient confused per aide, not remembering things she normally would. Has refused to eat food for two days citing poor appetite and has had poor mood though no suicidality. Patient with chronic pains of wounds of her hip/thigh as well as chronic R knee pain due to surgeries as above. There is some erythema surrounding the right knee that aide believes is somewhat worse than baseline over last 2-3 days but no exudates. Other wounds have been healing well per aide and she has wound vac on R hip and dressings in place on left thigh/glute. Patient has however also complained of chills at home. Denies any chest pains at this time, has not complained of this at home per aide. 	 Denies SOB at this time. (12 Feb 2019 23:38)    PERTINENT PM/SXH:   CAD (coronary artery disease)  Aortic stenosis, severe  Uncomplicated asthma, unspecified asthma severity  Polymyalgia  Lumbar disc disease with radiculopathy  Spider veins  Anxiety  Severe aortic stenosis by prior echocardiogram  Dysphagia  Morbid obesity with BMI of 50.0-59.9, adult  Macular degeneration  Hiatal hernia  GERD (gastroesophageal reflux disease)  Sciatica  Osteoarthritis  HTN (hypertension)    S/P TKR (total knee replacement)  S/P TAVR (transcatheter aortic valve replacement)  Artificial cardiac pacemaker  H/O cardiac catheterization  H/O endoscopy  S/P cataract surgery  S/P gastroplasty  S/P cholecystectomy  S/P total knee replacement  S/P total knee replacement  S/P hysterectomy    FAMILY HISTORY:  No pertinent family history in first degree relatives    ITEMS NOT CHECKED ARE NOT PRESENT    SOCIAL HISTORY:   Significant other/partner:  [ ]  Children:  [ ]  Jain/Spirituality:  Substance hx:  [ ]   Tobacco hx:  [ ]   Alcohol hx: [ ]   Home Opioid hx:  [ ] I-Stop Reference No:  Living Situation: [ ]Home  [ ]Long term care  [ ]Rehab [ ]Other    ADVANCE DIRECTIVES:    DNR  MOLST  [ ]  Living Will  [ ]   DECISION MAKER(s):  [ ] Health Care Proxy(s)  [ ] Surrogate(s)  [ ] Guardian           Name(s): Phone Number(s):    BASELINE (I)ADL(s) (prior to admission):  [place]: [ ]Total  [ ] Moderate [ ]Dependent    Allergies    amoxicillin (Other)    Intolerances    IV Contrast (Flushing (Skin); Nausea)  MEDICATIONS  (STANDING):  ascorbic acid 500 milliGRAM(s) Oral daily  aspirin enteric coated 81 milliGRAM(s) Oral daily  atorvastatin 10 milliGRAM(s) Oral at bedtime  bisacodyl 5 milliGRAM(s) Oral at bedtime  calcium carbonate 1250 mG  + Vitamin D (OsCal 500 + D) 1 Tablet(s) Oral three times a day  cholecalciferol 1000 Unit(s) Oral daily  cycloSPORINE  (SandIMMUNE)  Solution 75 milliGRAM(s) Oral two times a day  Dakins Solution - 1/4 Strength 1 Application(s) Topical two times a day  DAPTOmycin IVPB 700 milliGRAM(s) IV Intermittent every 24 hours  dextrose 5%. 1000 milliLiter(s) (100 mL/Hr) IV Continuous <Continuous>  escitalopram 5 milliGRAM(s) Oral daily  folic acid 1 milliGRAM(s) Oral daily  gabapentin 100 milliGRAM(s) Oral two times a day  heparin  Injectable 5000 Unit(s) SubCutaneous every 8 hours  meropenem  IVPB 1000 milliGRAM(s) IV Intermittent every 12 hours  metoprolol tartrate 12.5 milliGRAM(s) Oral two times a day  nystatin    Suspension 460315 Unit(s) Swish and Swallow three times a day  polyethylene glycol 3350 17 Gram(s) Oral daily  vitamin B complex with vitamin C 1 Tablet(s) Oral daily    MEDICATIONS  (PRN):  acetaminophen   Tablet .. 650 milliGRAM(s) Oral every 6 hours PRN Mild Pain (1 - 3)  acetaminophen   Tablet .. 650 milliGRAM(s) Oral every 6 hours PRN Temp greater or equal to 38C (100.4F)  melatonin 3 milliGRAM(s) Oral at bedtime PRN Insomnia  oxyCODONE    5 mG/acetaminophen 325 mG 1 Tablet(s) Oral every 6 hours PRN Severe Pain (7 - 10)    PRESENT SYMPTOMS: [ ]Unable to obtain due to poor mentation   Source if other than patient:  [ ]Family   [ ]Team     Pain (Impact on QOL):    Location -         Minimal acceptable level (0-10 scale):                    Aggravating factors -  Quality -  Radiation -  Severity (0-10 scale) -    Timing -    PAIN AD Score:     [email or web address] (press ctrl +  left click to view)    Dyspnea:                           [ ]Mild [ ]Moderate [ ]Severe  Anxiety:                             [ ]Mild [ ]Moderate [ ]Severe  Fatigue:                             [ ]Mild [ ]Moderate [ ]Severe  Nausea:                             [ ]Mild [ ]Moderate [ ]Severe  Loss of appetite:              [ ]Mild [ ]Moderate [ ]Severe  Constipation:                    [ ]Mild [ ]Moderate [ ]Severe    Other Symptoms:  [ ]All other review of systems negative     Karnofsky Performance Score/Palliative Performance Status Version 2:         %    http://palliative.info/resource_material/[email or web address].pdf  PHYSICAL EXAM:  Vital Signs Last 24 Hrs  T(C): 36.8 (12 Mar 2019 07:44), Max: 37.6 (11 Mar 2019 14:30)  T(F): 98.2 (12 Mar 2019 07:44), Max: 99.6 (11 Mar 2019 14:30)  HR: 92 (12 Mar 2019 07:44) (91 - 104)  BP: 106/73 (12 Mar 2019 07:44) (92/64 - 133/66)  BP(mean): --  RR: 18 (12 Mar 2019 07:44) (18 - 20)  SpO2: 98% (12 Mar 2019 07:44) (95% - 98%) I&O's Summary    11 Mar 2019 07:01  -  12 Mar 2019 07:00  --------------------------------------------------------  IN: 2160 mL / OUT: 251 mL / NET: 1909 mL    12 Mar 2019 07:01  -  12 Mar 2019 12:45  --------------------------------------------------------  IN: 400 mL / OUT: 0 mL / NET: 400 mL    GENERAL:  [ ]Alert  [ ]Oriented x   [ ]Lethargic  [ ]Cachexia  [ ]Unarousable  [ ]Verbal  [ ]Non-Verbal  Behavioral:   [ ] Anxiety  [ ] Delirium [ ] Agitation [ ] Other  HEENT:  [ ]Normal   [ ]Dry mouth   [ ]ET Tube/Trach  [ ]Oral lesions  PULMONARY:   [ ]Clear [ ]Tachypnea  [ ]Audible excessive secretions   [ ]Rhonchi        [ ]Right [ ]Left [ ]Bilateral  [ ]Crackles        [ ]Right [ ]Left [ ]Bilateral  [ ]Wheezing     [ ]Right [ ]Left [ ]Bilateral  CARDIOVASCULAR:    [ ]Regular [ ]Irregular [ ]Tachy  [ ]Srikanth [ ]Murmur [ ]Other  GASTROINTESTINAL:  [ ]Soft  [ ]Distended   [ ]+BS  [ ]Non tender [ ]Tender  [ ]PEG [ ]OGT/ NGT  Last BM:   GENITOURINARY:  [ ]Normal [ ] Incontinent   [ ]Oliguria/Anuria   [ ][place]  MUSCULOSKELETAL:   [ ]Normal   [ ]Weakness  [ ]Bed/Wheelchair bound [ ]Edema  NEUROLOGIC:   [ ]No focal deficits  [ ] Cognitive impairment  [ ] Dysphagia [ ]Dysarthria [ ] Paresis [ ]Other   SKIN:   [ ]Normal   [ ]Pressure ulcer(s)  [ ]Rash    CRITICAL CARE:  [ ] Shock Present  [ ]Septic [ ]Cardiogenic [ ]Neurologic [ ]Hypovolemic  [ ]  Vasopressors [ ]  Inotropes   [ ] Respiratory failure present  [ ] Acute  [ ] Chronic [ ] Hypoxic  [ ] Hypercarbic [ ] Other  [ ] Other organ failure     LABS:                        7.2    25.0  )-----------( 252      ( 12 Mar 2019 07:35 )             22.7   03-12    145  |  108  |  42<H>  ----------------------------<  119<H>  4.4   |  25  |  2.07<H>    Ca    8.1<L>      12 Mar 2019 07:34          RADIOLOGY & ADDITIONAL STUDIES:    PROTEIN CALORIE MALNUTRITION PRESENT: [ ] Yes [ ] No  [ ] PPSV2 < or = to 30% [ ] significant weight loss  [ ] poor nutritional intake [ ] catabolic state [ ] anasarca     Albumin, Serum: 3.1 g/dL (02-12-19 @ 11:49)  Artificial Nutrition [ ]     REFERRALS:   [ ]Chaplaincy  [ ] Hospice  [ ]Child Life  [ ]Social Work  [ ]Case management [ ]Holistic Therapy   Goals of Care Discussion Document:

## 2019-03-12 NOTE — PROGRESS NOTE ADULT - ASSESSMENT
· Assessment		  78 F PMH of CAD s/p stent to LAD, AS s/p TAVR, PPM, DVT (Jan '18) on coumadin, IVC filter, pyoderma gangrenosum, hx MRSA infections, and total knee replacement c/b joint infections s/p I&D explantation and spacer placement followed by spacer removal and static spacer placement with joint fusion due to infection of initial spacer with complex wound closure by plastic surgery, admission here in November 2018 for hip and thigh wounds, now presenting with confusion and chills at home associated with poor appetite and confusion x 1 week and not eating x 2 days. Noted with fever here, YONATAN on labwork. R knee evaluated by orthopedic surgery.	    Problem/Plan - 1:  ·  Problem: Bacteremia.  Plan: 2' S epi  Daptomycin last day is 3/28/19; PICC placed 3/6/19  blood cultures from 2/28, 3/9 are negative.  ID f/u appreciated  TTE without discrete vegetation  Too high-risk for ERIKA  CT chest 2/11 revealing b/l pleural effusions  Poor prognosis    Problem/Plan - 2:  ·  Problem: Iron deficiency anemia.  Plan: in setting of CK3  s/p venofer 200 mg on 3/7 and 3/8  s/p 1 unit of pRBC on 3/9  daily CBC.     Problem/Plan - 3:  ·  Problem: Pyoderma gangrenosum.  Plan: cyclosporine temporarily held on admission, but later restarted.   Cyclosporine dose adjusted to 100 mg BID based on levels  completed chronic steroids taper for pyoderma.  (she was on Prednisone 5 mg qdaily)  Per orthopedic surgery can be OOB primarily to wheelchair and can 50% weight  wound care consult and wound vac management appreciated.     Problem/Plan - 4:  ·  Problem: Altered mental status, unspecified altered mental status type.  Plan: likely 2/2 metabolic encephalopathy given fever and concern for cellulitis  mental status stable  Patient also on narcotics but stable on current regimen.   c/w oxycodone to q6hr PRN.  Per the son, she gets q4hr sometime at home if pain is severe.   she is on home gabapentin 300 mg BID, neurontin is completely off for now    Problem/Plan - 5:  ·  Problem: Acute kidney failure.  Plan: likely cardiorenal. intravascular depleted but grossly volume overloaded.   poor PO intake.   renal appreciated  Holding off diuretics for now  Repeat urine labs and bladder scan ordered 3/12/19    Problem/Plan - 6:  Problem: Coronary artery disease involving native coronary artery of native heart without angina pectoris. Plan: Cont Atovastatin 10 mg QHS.    Problem/Plan - 7:  ·  Problem: Aortic stenosis, severe.  Plan: Pt with severe AS, need to be cautious with fluids.     Problem/Plan - 8:  ·  Problem: Gastroesophageal reflux disease, esophagitis presence not specified.  Plan: Protonix.     Problem/Plan - 9:  ·  Problem: Depression, unspecified depression type.    c/w low dose Lexapro 5 mg.     Problem/Plan - 10:  Problem: Need for prophylactic measure. Plan; SC heparin 5000 units q8h.    Problem/Plan - 11:  ·  Problem: poor prognosis.  Appreciate palliative assistance as family members do not seem to grasp complexity of pt's condition.

## 2019-03-12 NOTE — PROGRESS NOTE ADULT - NSICDXPROBLEM_GEN_ALL_CORE_FT
PROBLEM DIAGNOSES  Problem: Hypernatremia  Assessment and Plan: on d5 - would d/c for now  na better   monitor    Problem: Edema  Assessment and Plan: off diuretics  d/c ivf  check serum albumin  monitor    Problem: Acute kidney injury superimposed on CKD  Assessment and Plan: cr rising- unclear etiology- ddx includes pre renal vs atn from sepsis/hypotension/anemia vs obstruction  check ua  check urine na/cr/cl/osm/k  check bladder scan  check ck level as pt is on daptomycin  encourage po intake  trend bmp  avoid nephrotoxins

## 2019-03-12 NOTE — PROGRESS NOTE ADULT - SUBJECTIVE AND OBJECTIVE BOX
Patient  seen and examined  lethargic    amoxicillin (Other)  IV Contrast (Flushing (Skin); Nausea)    Hospital Medications:   MEDICATIONS  (STANDING):  ascorbic acid 500 milliGRAM(s) Oral daily  aspirin enteric coated 81 milliGRAM(s) Oral daily  atorvastatin 10 milliGRAM(s) Oral at bedtime  bisacodyl 5 milliGRAM(s) Oral at bedtime  calcium carbonate 1250 mG  + Vitamin D (OsCal 500 + D) 1 Tablet(s) Oral three times a day  cholecalciferol 1000 Unit(s) Oral daily  cycloSPORINE  (SandIMMUNE)  Solution 75 milliGRAM(s) Oral two times a day  Dakins Solution - 1/4 Strength 1 Application(s) Topical two times a day  DAPTOmycin IVPB 700 milliGRAM(s) IV Intermittent every 24 hours  dextrose 5%. 1000 milliLiter(s) (100 mL/Hr) IV Continuous <Continuous>  escitalopram 5 milliGRAM(s) Oral daily  folic acid 1 milliGRAM(s) Oral daily  gabapentin 100 milliGRAM(s) Oral two times a day  heparin  Injectable 5000 Unit(s) SubCutaneous every 8 hours  meropenem  IVPB 1000 milliGRAM(s) IV Intermittent every 12 hours  metoprolol tartrate 12.5 milliGRAM(s) Oral two times a day  nystatin    Suspension [id number] Unit(s) Swish and Swallow three times a day  polyethylene glycol 3350 17 Gram(s) Oral daily  vitamin B complex with vitamin C 1 Tablet(s) Oral daily        VITALS:  T(F): 98.2 (03-12-19 @ 07:44), Max: 99.6 (03-11-19 @ 14:30)  HR: 92 (03-12-19 @ 07:44)  BP: 106/73 (03-12-19 @ 07:44)  RR: 18 (03-12-19 @ 07:44)  SpO2: 98% (03-12-19 @ 07:44)  Wt(kg): --    03-11 @ 07:01  -  03-12 @ 07:00  --------------------------------------------------------  IN: 2160 mL / OUT: 251 mL / NET: 1909 mL    03-12 @ 07:01  -  03-12 @ 14:02  --------------------------------------------------------  IN: 400 mL / OUT: 0 mL / NET: 400 mL          Physical Exam :-  Constitutional: NAD  Neck: Supple.  Respiratory: Bilateral equal breath sounds,  Cardiovascular: S1, S2 normal,  Gastrointestinal: Bowel Sounds present, soft, non tender.  Extremities: Trace edema  Neurological: Confused      LABS:  03-12    145  |  108  |  42<H>  ----------------------------<  119<H>  4.4   |  25  |  2.07<H>    Ca    8.1<L>      12 Mar 2019 07:34      Creatinine Trend: 2.07 <--, 1.87 <--, 1.73 <--, 1.79 <--, 2.00 <--, 1.96 <--, 1.65 <--                        7.2    25.0  )-----------( 252      ( 12 Mar 2019 07:35 )             22.7     Urine Studies:      RADIOLOGY & ADDITIONAL STUDIES:

## 2019-03-12 NOTE — CONSULT NOTE ADULT - SUBJECTIVE AND OBJECTIVE BOX
HPI:  78F PMH of CAD s/p stent to LAD, AS s/p TAVR, PPM, DVT (Jan '18) on coumadin, IVC filter, pyoderma gangrenosum, hx MRSA infections, and total knee replacement c/b joint infections s/p I&D explantation and spacer placement followed by spacer removal and static spacer placement with joint fusion due to infection of initial spacer with complex wound closure by plastic surgery, admission here in November 2018 for hip and thigh wounds, now presenting with confusion and chills at home associated with poor appetite and confusion x 1 week and not eating x 2 days. Patient confused per aide, not remembering things she normally would. Has refused to eat food for two days citing poor appetite and has had poor mood though no suicidality. Patient with chronic pains of wounds of her hip/thigh as well as chronic R knee pain due to surgeries as above. There is some erythema surrounding the right knee that aide believes is somewhat worse than baseline over last 2-3 days but no exudates. Other wounds have been healing well per aide and she has wound vac on R hip and dressings in place on left thigh/glute. Patient has however also complained of chills at home. Denies any chest pains at this time, has not complained of this at home per aide. 	 Denies SOB at this time. (12 Feb 2019 23:38)                PERTINENT PM/SXH:   CAD (coronary artery disease)  Aortic stenosis, severe  Uncomplicated asthma, unspecified asthma severity  Polymyalgia  Lumbar disc disease with radiculopathy  Spider veins  Anxiety  Severe aortic stenosis by prior echocardiogram  Dysphagia  Morbid obesity with BMI of 50.0-59.9, adult  Macular degeneration  Hiatal hernia  GERD (gastroesophageal reflux disease)  Sciatica  Osteoarthritis  HTN (hypertension)    S/P TKR (total knee replacement)  S/P TAVR (transcatheter aortic valve replacement)  Artificial cardiac pacemaker  H/O cardiac catheterization  H/O endoscopy  S/P cataract surgery  S/P gastroplasty  S/P cholecystectomy  S/P total knee replacement  S/P total knee replacement  S/P hysterectomy    FAMILY HISTORY:  No pertinent family history in first degree relatives    ITEMS NOT CHECKED ARE NOT PRESENT    SOCIAL HISTORY:   Significant other/partner:  [ ]  Children:  [ ]  Protestant/Spirituality:  Substance hx:  [ ]   Tobacco hx:  [ ]   Alcohol hx: [ ]   Home Opioid hx:  [ ] I-Stop Reference No:  Living Situation: [ ]Home  [ ]Long term care  [ ]Rehab [ ]Other    ADVANCE DIRECTIVES:    DNR  MOLST  [ ]  Living Will  [ ]   DECISION MAKER(s):  [ ] Health Care Proxy(s)  [ ] Surrogate(s)  [ ] Guardian           Name(s): Phone Number(s):    BASELINE (I)ADL(s) (prior to admission):  [place]: [ ]Total  [ ] Moderate [ ]Dependent    Allergies    amoxicillin (Other)    Intolerances    IV Contrast (Flushing (Skin); Nausea)  MEDICATIONS  (STANDING):  ascorbic acid 500 milliGRAM(s) Oral daily  aspirin enteric coated 81 milliGRAM(s) Oral daily  atorvastatin 10 milliGRAM(s) Oral at bedtime  bisacodyl 5 milliGRAM(s) Oral at bedtime  calcium carbonate 1250 mG  + Vitamin D (OsCal 500 + D) 1 Tablet(s) Oral three times a day  cholecalciferol 1000 Unit(s) Oral daily  cycloSPORINE  (SandIMMUNE)  Solution 75 milliGRAM(s) Oral two times a day  Dakins Solution - 1/4 Strength 1 Application(s) Topical two times a day  DAPTOmycin IVPB 700 milliGRAM(s) IV Intermittent every 24 hours  dextrose 5%. 1000 milliLiter(s) (100 mL/Hr) IV Continuous <Continuous>  escitalopram 5 milliGRAM(s) Oral daily  folic acid 1 milliGRAM(s) Oral daily  gabapentin 100 milliGRAM(s) Oral two times a day  heparin  Injectable 5000 Unit(s) SubCutaneous every 8 hours  meropenem  IVPB 1000 milliGRAM(s) IV Intermittent every 12 hours  metoprolol tartrate 12.5 milliGRAM(s) Oral two times a day  nystatin    Suspension 876458 Unit(s) Swish and Swallow three times a day  polyethylene glycol 3350 17 Gram(s) Oral daily  vitamin B complex with vitamin C 1 Tablet(s) Oral daily    MEDICATIONS  (PRN):  acetaminophen   Tablet .. 650 milliGRAM(s) Oral every 6 hours PRN Mild Pain (1 - 3)  acetaminophen   Tablet .. 650 milliGRAM(s) Oral every 6 hours PRN Temp greater or equal to 38C (100.4F)  melatonin 3 milliGRAM(s) Oral at bedtime PRN Insomnia  oxyCODONE    5 mG/acetaminophen 325 mG 1 Tablet(s) Oral every 6 hours PRN Severe Pain (7 - 10)    PRESENT SYMPTOMS: [ ]Unable to obtain due to poor mentation   Source if other than patient:  [ ]Family   [ ]Team     Pain (Impact on QOL):    Location -         Minimal acceptable level (0-10 scale):                    Aggravating factors -  Quality -  Radiation -  Severity (0-10 scale) -    Timing -    PAIN AD Score:     [email or web address] (press ctrl +  left click to view)    Dyspnea:                           [ ]Mild [ ]Moderate [ ]Severe  Anxiety:                             [ ]Mild [ ]Moderate [ ]Severe  Fatigue:                             [ ]Mild [ ]Moderate [ ]Severe  Nausea:                             [ ]Mild [ ]Moderate [ ]Severe  Loss of appetite:              [ ]Mild [ ]Moderate [ ]Severe  Constipation:                    [ ]Mild [ ]Moderate [ ]Severe    Other Symptoms:  [ ]All other review of systems negative     Karnofsky Performance Score/Palliative Performance Status Version 2:         %    http://palliative.info/resource_material/PPSv2.pdf  PHYSICAL EXAM:  Vital Signs Last 24 Hrs  T(C): 36.8 (12 Mar 2019 07:44), Max: 37.6 (11 Mar 2019 14:30)  T(F): 98.2 (12 Mar 2019 07:44), Max: 99.6 (11 Mar 2019 14:30)  HR: 92 (12 Mar 2019 07:44) (91 - 104)  BP: 106/73 (12 Mar 2019 07:44) (92/64 - 133/66)  BP(mean): --  RR: 18 (12 Mar 2019 07:44) (18 - 20)  SpO2: 98% (12 Mar 2019 07:44) (95% - 98%) I&O's Summary    11 Mar 2019 07:01  -  12 Mar 2019 07:00  --------------------------------------------------------  IN: 2160 mL / OUT: 251 mL / NET: 1909 mL    12 Mar 2019 07:01  -  12 Mar 2019 13:18  --------------------------------------------------------  IN: 400 mL / OUT: 0 mL / NET: 400 mL    GENERAL:  [ ]Alert  [ ]Oriented x   [ ]Lethargic  [ ]Cachexia  [ ]Unarousable  [ ]Verbal  [ ]Non-Verbal  Behavioral:   [ ] Anxiety  [ ] Delirium [ ] Agitation [ ] Other  HEENT:  [ ]Normal   [ ]Dry mouth   [ ]ET Tube/Trach  [ ]Oral lesions  PULMONARY:   [ ]Clear [ ]Tachypnea  [ ]Audible excessive secretions   [ ]Rhonchi        [ ]Right [ ]Left [ ]Bilateral  [ ]Crackles        [ ]Right [ ]Left [ ]Bilateral  [ ]Wheezing     [ ]Right [ ]Left [ ]Bilateral  CARDIOVASCULAR:    [ ]Regular [ ]Irregular [ ]Tachy  [ ]Srikanth [ ]Murmur [ ]Other  GASTROINTESTINAL:  [ ]Soft  [ ]Distended   [ ]+BS  [ ]Non tender [ ]Tender  [ ]PEG [ ]OGT/ NGT  Last BM:   GENITOURINARY:  [ ]Normal [ ] Incontinent   [ ]Oliguria/Anuria   [ ]Navarrete  MUSCULOSKELETAL:   [ ]Normal   [ ]Weakness  [ ]Bed/Wheelchair bound [ ]Edema  NEUROLOGIC:   [ ]No focal deficits  [ ] Cognitive impairment  [ ] Dysphagia [ ]Dysarthria [ ] Paresis [ ]Other   SKIN:   [ ]Normal   [ ]Pressure ulcer(s)  [ ]Rash    CRITICAL CARE:  [ ] Shock Present  [ ]Septic [ ]Cardiogenic [ ]Neurologic [ ]Hypovolemic  [ ]  Vasopressors [ ]  Inotropes   [ ] Respiratory failure present  [ ] Acute  [ ] Chronic [ ] Hypoxic  [ ] Hypercarbic [ ] Other  [ ] Other organ failure     LABS:                        7.2    25.0  )-----------( 252      ( 12 Mar 2019 07:35 )             22.7   03-12    145  |  108  |  42<H>  ----------------------------<  119<H>  4.4   |  25  |  2.07<H>    Ca    8.1<L>      12 Mar 2019 07:34          RADIOLOGY & ADDITIONAL STUDIES:    PROTEIN CALORIE MALNUTRITION PRESENT: [ ] Yes [ ] No  [ ] PPSV2 < or = to 30% [ ] significant weight loss  [ ] poor nutritional intake [ ] catabolic state [ ] anasarca     Albumin, Serum: 3.1 g/dL (02-12-19 @ 11:49)  Artificial Nutrition [ ]     REFERRALS:   [ ]Chaplaincy  [ ] Hospice  [ ]Child Life  [ ]Social Work  [ ]Case management [ ]Holistic Therapy   Goals of Care Discussion Document: HPI:  78F PMH of CAD s/p stent to LAD, AS s/p TAVR, PPM, DVT (Jan '18) on coumadin, IVC filter, pyoderma gangrenosum, hx MRSA infections, and total knee replacement c/b joint infections s/p I&D explantation and spacer placement followed by spacer removal and static spacer placement with joint fusion due to infection of initial spacer with complex wound closure by plastic surgery, admission here in November 2018 for hip and thigh wounds, now presenting with confusion and chills at home associated with poor appetite and confusion x 1 week and not eating x 2 days. Patient confused per aide, not remembering things she normally would. Has refused to eat food for two days citing poor appetite and has had poor mood though no suicidality. Patient with chronic pains of wounds of her hip/thigh as well as chronic R knee pain due to surgeries as above. There is some erythema surrounding the right knee that aide believes is somewhat worse than baseline over last 2-3 days but no exudates. Other wounds have been healing well per aide and she has wound vac on R hip and dressings in place on left thigh/glute. Patient has however also complained of chills at home. Denies any chest pains at this time, has not complained of this at home per aide. 	 Denies SOB at this time. (12 Feb 2019 23:38)      The patient is encephalopathic  She is not answering questions appropriately  Her HHA is sitting by the patient and provided invaluable collateral information  The patient uses a wheelchair to move around in her [place] apartment  It seems as if the  is hypervigilant surrounding opiate use and ascribes adverse outcomes to it irrespective of the dose  It may be that the  may not recognize the complexity of his wife's illness. She intimated that this may be true of the children as well. According to the HHA, this is the worst the patient has been over the last month.          PERTINENT PM/SXH:   CAD (coronary artery disease)  Aortic stenosis, severe  Uncomplicated asthma, unspecified asthma severity  Polymyalgia  Lumbar disc disease with radiculopathy  Spider veins  Anxiety  Severe aortic stenosis by prior echocardiogram  Dysphagia  Morbid obesity with BMI of 50.0-59.9, adult  Macular degeneration  Hiatal hernia  GERD (gastroesophageal reflux disease)  Sciatica  Osteoarthritis  HTN (hypertension)    S/P TKR (total knee replacement)  S/P TAVR (transcatheter aortic valve replacement)  Artificial cardiac pacemaker  H/O cardiac catheterization  H/O endoscopy  S/P cataract surgery  S/P gastroplasty  S/P cholecystectomy  S/P total knee replacement  S/P total knee replacement  S/P hysterectomy    FAMILY HISTORY:  No pertinent family history in first degree relatives    ITEMS NOT CHECKED ARE NOT PRESENT    SOCIAL HISTORY:   Significant other/partner:  [x ]  Children:  [x ]  Voodoo/Spirituality:  Substance hx:  [ ]   Tobacco hx:  [x ]   Alcohol hx: [ ]   Home Opioid hx:  [x ] I-Stop Reference No:  Living Situation: [ x]Home  [ ]Long term care  [ ]Rehab [ ]Other    ADVANCE DIRECTIVES:     MOLST  [ ]  Living Will  [ ]   DECISION MAKER(s):  [ ] Health Care Proxy(s)  [ x] Surrogate(s)    [ ] Guardian           Name(s): Phone Number(s):    BASELINE (I)ADL(s) (prior to admission):  [place]: [ ]Total  [ ] Moderate [ ]Dependent    Allergies    amoxicillin (Other)    Intolerances    IV Contrast (Flushing (Skin); Nausea)  MEDICATIONS  (STANDING):  ascorbic acid 500 milliGRAM(s) Oral daily  aspirin enteric coated 81 milliGRAM(s) Oral daily  atorvastatin 10 milliGRAM(s) Oral at bedtime  bisacodyl 5 milliGRAM(s) Oral at bedtime  calcium carbonate 1250 mG  + Vitamin D (OsCal 500 + D) 1 Tablet(s) Oral three times a day  cholecalciferol 1000 Unit(s) Oral daily  cycloSPORINE  (SandIMMUNE)  Solution 75 milliGRAM(s) Oral two times a day  Dakins Solution - 1/4 Strength 1 Application(s) Topical two times a day  DAPTOmycin IVPB 700 milliGRAM(s) IV Intermittent every 24 hours  dextrose 5%. 1000 milliLiter(s) (100 mL/Hr) IV Continuous <Continuous>  escitalopram 5 milliGRAM(s) Oral daily  folic acid 1 milliGRAM(s) Oral daily  gabapentin 100 milliGRAM(s) Oral two times a day  heparin  Injectable 5000 Unit(s) SubCutaneous every 8 hours  meropenem  IVPB 1000 milliGRAM(s) IV Intermittent every 12 hours  metoprolol tartrate 12.5 milliGRAM(s) Oral two times a day  nystatin    Suspension 616958 Unit(s) Swish and Swallow three times a day  polyethylene glycol 3350 17 Gram(s) Oral daily  vitamin B complex with vitamin C 1 Tablet(s) Oral daily    MEDICATIONS  (PRN):  acetaminophen   Tablet .. 650 milliGRAM(s) Oral every 6 hours PRN Mild Pain (1 - 3)  acetaminophen   Tablet .. 650 milliGRAM(s) Oral every 6 hours PRN Temp greater or equal to 38C (100.4F)  melatonin 3 milliGRAM(s) Oral at bedtime PRN Insomnia  oxyCODONE    5 mG/acetaminophen 325 mG 1 Tablet(s) Oral every 6 hours PRN Severe Pain (7 - 10)    PRESENT SYMPTOMS: [ ]Unable to obtain due to poor mentation   Source if other than patient:  [ x]Family/HHA   [ ]Team     Pain (Impact on QOL):    Location -       LE thigh  Minimal acceptable level (0-10 scale):                    Aggravating factors -  Quality -  Radiation -  Severity (0-10 scale) -    Timing -    PAIN AD Score:     [email or web address]/[email or web address] (press ctrl +  left click to view)    Dyspnea:                           [ ]Mild [ ]Moderate [ ]Severe  Anxiety:                             [ ]Mild [ ]Moderate [ ]Severe  Fatigue:                             [ ]Mild [x ]Moderate [ ]Severe  Nausea:                             [ ]Mild [ ]Moderate [ ]Severe  Loss of appetite:              [ ]Mild [ ]Moderate [ ]Severe  Constipation:                    [ ]Mild [ ]Moderate [ ]Severe    Other Symptoms:  [x ]All other review of systems negative     Karnofsky Performance Score/Palliative Performance Status Version 2:     30    %    http://palliative.info/resource_material/[email or web address].pdf  PHYSICAL EXAM:  Vital Signs Last 24 Hrs  T(C): 36.8 (12 Mar 2019 07:44), Max: 37.6 (11 Mar 2019 14:30)  T(F): 98.2 (12 Mar 2019 07:44), Max: 99.6 (11 Mar 2019 14:30)  HR: 92 (12 Mar 2019 07:44) (91 - 104)  BP: 106/73 (12 Mar 2019 07:44) (92/64 - 133/66)  BP(mean): --  RR: 18 (12 Mar 2019 07:44) (18 - 20)  SpO2: 98% (12 Mar 2019 07:44) (95% - 98%) I&O's Summary    11 Mar 2019 07:01  -  12 Mar 2019 07:00  --------------------------------------------------------  IN: 2160 mL / OUT: 251 mL / NET: 1909 mL    12 Mar 2019 07:01  -  12 Mar 2019 13:18  --------------------------------------------------------  IN: 400 mL / OUT: 0 mL / NET: 400 mL    GENERAL:  [ ]Alert  [ ]Oriented x 1   [ x]Lethargic  [ ]Cachexia  [ ]Unarousable  [ ]Verbal  [ ]Non-Verbal  Behavioral:   [ ] Anxiety  [ ] Delirium [ ] Agitation [ ] Other  HEENT:  [ ]Normal   x[ ]Dry mouth   [ ]ET Tube/Trach  [ ]Oral lesions  PULMONARY:   [x ]Clear [ ]Tachypnea  [ ]Audible excessive secretions   [ ]Rhonchi        [ ]Right [ ]Left [ ]Bilateral  [ ]Crackles        [ ]Right [ ]Left [ ]Bilateral  [ ]Wheezing     [ ]Right [ ]Left [ ]Bilateral  CARDIOVASCULAR:    [ x]Regular [ ]Irregular [ ]Tachy  [ ]Srikanth [ ]Murmur [ ]Other  GASTROINTESTINAL:  [x ]Soft  [ ]Distended   [ ]+BS  [ ]Non tender [ ]Tender  [ ]PEG [ ]OGT/ NGT  Last BM:   GENITOURINARY:  [ ]Normal [ ] Incontinent   [ ]Oliguria/Anuria   [ ]Navarrete  MUSCULOSKELETAL:   [ ]Normal   [ x]Weakness  [ ]Bed/Wheelchair bound [ ]Edema  NEUROLOGIC:   [ ]No focal deficits  [x ] Cognitive impairment  [ ] Dysphagia [ ]Dysarthria [ ] Paresis [ ]Other   SKIN:   [ ]Normal   [ ]Pressure ulcer(s)  [ ]Rash lesions/phyoderma    CRITICAL CARE:  [ ] Shock Present  [ ]Septic [ ]Cardiogenic [ ]Neurologic [ ]Hypovolemic  [ ]  Vasopressors [ ]  Inotropes   [ ] Respiratory failure present  [ ] Acute  [ ] Chronic [ ] Hypoxic  [ ] Hypercarbic [ ] Other  [ ] Other organ failure     LABS:                        7.2    25.0  )-----------( 252      ( 12 Mar 2019 07:35 )             22.7   03-12    145  |  108  |  42<H>  ----------------------------<  119<H>  4.4   |  25  |  2.07<H>    Ca    8.1<L>      12 Mar 2019 07:34          RADIOLOGY & ADDITIONAL STUDIES:    PROTEIN CALORIE MALNUTRITION PRESENT: [ ] Yes [ ] No  [ ] PPSV2 < or = to 30% [ ] significant weight loss  [ ] poor nutritional intake [ ] catabolic state [ ] anasarca     Albumin, Serum: 3.1 g/dL (02-12-19 @ 11:49)  Artificial Nutrition [ ]     REFERRALS:   [ ]Chaplaincy  [ ] Hospice  [ ]Child Life  [ ]Social Work  [ ]Case management [ ]Holistic Therapy   Goals of Care Discussion Document:

## 2019-03-12 NOTE — PROGRESS NOTE ADULT - SUBJECTIVE AND OBJECTIVE BOX
Still lethargic  Cr rising again    Vital Signs Last 24 Hrs  T(C): 37.3 (12 Mar 2019 16:18), Max: 37.4 (11 Mar 2019 19:31)  T(F): 99.2 (12 Mar 2019 16:18), Max: 99.3 (11 Mar 2019 19:31)  HR: 99 (12 Mar 2019 16:18) (91 - 104)  BP: 117/72 (12 Mar 2019 16:18) (92/64 - 133/66)  BP(mean): --  RR: 20 (12 Mar 2019 16:18) (18 - 20)  SpO2: 98% (12 Mar 2019 16:18) (95% - 98%)      GENERAL: lethargic, morbidly obese  HEAD:  Atraumatic, Normocephalic  EYES: EOMI, PERRLA, conjunctiva and sclera clear  NECK: Supple, No JVD  CHEST/LUNG: mild decrease breath sounds bilaterally; No wheeze   HEART: Regular rate and rhythm; No murmurs, rubs, or gallops  ABDOMEN: Soft, Nontender, Nondistended; Bowel sounds present  Neuro: lethargy prevents utility of this exam  EXTREMITIES:  2+ Peripheral Pulses, No clubbing, cyanosis.  2+ nonpitting edema LE, UE 2-3+ edema  Back: sacral and thigh wound. +wound vac in place  SKIN: No rashes or lesions    LABS:                        7.2    25.0  )-----------( 252      ( 12 Mar 2019 07:35 )             22.7     03-12    145  |  108  |  42<H>  ----------------------------<  119<H>  4.4   |  25  |  2.07<H>    Ca    8.1<L>      12 Mar 2019 07:34        CAPILLARY BLOOD GLUCOSE

## 2019-03-13 ENCOUNTER — RESULT REVIEW (OUTPATIENT)
Age: 79
End: 2019-03-13

## 2019-03-13 LAB
ANION GAP SERPL CALC-SCNC: 12 MMOL/L — SIGNIFICANT CHANGE UP (ref 5–17)
BUN SERPL-MCNC: 42 MG/DL — HIGH (ref 7–23)
CALCIUM SERPL-MCNC: 8.2 MG/DL — LOW (ref 8.4–10.5)
CHLORIDE SERPL-SCNC: 107 MMOL/L — SIGNIFICANT CHANGE UP (ref 96–108)
CHLORIDE UR-SCNC: <35 MMOL/L — SIGNIFICANT CHANGE UP
CK SERPL-CCNC: 28 U/L — SIGNIFICANT CHANGE UP (ref 25–170)
CO2 SERPL-SCNC: 24 MMOL/L — SIGNIFICANT CHANGE UP (ref 22–31)
CREAT SERPL-MCNC: 1.96 MG/DL — HIGH (ref 0.5–1.3)
GLUCOSE SERPL-MCNC: 86 MG/DL — SIGNIFICANT CHANGE UP (ref 70–99)
HCT VFR BLD CALC: 25.9 % — LOW (ref 34.5–45)
HGB BLD-MCNC: 7.4 G/DL — LOW (ref 11.5–15.5)
MCHC RBC-ENTMCNC: 26.4 PG — LOW (ref 27–34)
MCHC RBC-ENTMCNC: 28.6 GM/DL — LOW (ref 32–36)
MCV RBC AUTO: 92.5 FL — SIGNIFICANT CHANGE UP (ref 80–100)
PLATELET # BLD AUTO: 284 K/UL — SIGNIFICANT CHANGE UP (ref 150–400)
POTASSIUM SERPL-MCNC: 4.5 MMOL/L — SIGNIFICANT CHANGE UP (ref 3.5–5.3)
POTASSIUM SERPL-SCNC: 4.5 MMOL/L — SIGNIFICANT CHANGE UP (ref 3.5–5.3)
POTASSIUM UR-SCNC: 50 MMOL/L — SIGNIFICANT CHANGE UP
RBC # BLD: 2.8 M/UL — LOW (ref 3.8–5.2)
RBC # FLD: 19.1 % — HIGH (ref 10.3–14.5)
SODIUM SERPL-SCNC: 143 MMOL/L — SIGNIFICANT CHANGE UP (ref 135–145)
SODIUM UR-SCNC: <20 MMOL/L — SIGNIFICANT CHANGE UP
WBC # BLD: 29.81 K/UL — HIGH (ref 3.8–10.5)
WBC # FLD AUTO: 29.81 K/UL — HIGH (ref 3.8–10.5)

## 2019-03-13 PROCEDURE — 99232 SBSQ HOSP IP/OBS MODERATE 35: CPT

## 2019-03-13 PROCEDURE — 88305 TISSUE EXAM BY PATHOLOGIST: CPT | Mod: 26

## 2019-03-13 RX ADMIN — Medication 1 TABLET(S): at 21:40

## 2019-03-13 RX ADMIN — Medication 81 MILLIGRAM(S): at 11:58

## 2019-03-13 RX ADMIN — OXYCODONE AND ACETAMINOPHEN 1 TABLET(S): 5; 325 TABLET ORAL at 09:20

## 2019-03-13 RX ADMIN — HEPARIN SODIUM 5000 UNIT(S): 5000 INJECTION INTRAVENOUS; SUBCUTANEOUS at 06:59

## 2019-03-13 RX ADMIN — Medication 1 TABLET(S): at 16:00

## 2019-03-13 RX ADMIN — OXYCODONE AND ACETAMINOPHEN 1 TABLET(S): 5; 325 TABLET ORAL at 09:50

## 2019-03-13 RX ADMIN — Medication 1 APPLICATION(S): at 21:43

## 2019-03-13 RX ADMIN — Medication 1000 UNIT(S): at 11:58

## 2019-03-13 RX ADMIN — Medication 500000 UNIT(S): at 21:40

## 2019-03-13 RX ADMIN — ESCITALOPRAM OXALATE 5 MILLIGRAM(S): 10 TABLET, FILM COATED ORAL at 11:57

## 2019-03-13 RX ADMIN — Medication 650 MILLIGRAM(S): at 09:10

## 2019-03-13 RX ADMIN — Medication 500 MILLIGRAM(S): at 11:58

## 2019-03-13 RX ADMIN — Medication 12.5 MILLIGRAM(S): at 18:09

## 2019-03-13 RX ADMIN — Medication 650 MILLIGRAM(S): at 08:21

## 2019-03-13 RX ADMIN — MEROPENEM 100 MILLIGRAM(S): 1 INJECTION INTRAVENOUS at 18:09

## 2019-03-13 RX ADMIN — Medication 1 TABLET(S): at 06:59

## 2019-03-13 RX ADMIN — Medication 500000 UNIT(S): at 06:59

## 2019-03-13 RX ADMIN — Medication 5 MILLIGRAM(S): at 21:40

## 2019-03-13 RX ADMIN — HEPARIN SODIUM 5000 UNIT(S): 5000 INJECTION INTRAVENOUS; SUBCUTANEOUS at 21:41

## 2019-03-13 RX ADMIN — Medication 12.5 MILLIGRAM(S): at 06:59

## 2019-03-13 RX ADMIN — Medication 1 APPLICATION(S): at 07:01

## 2019-03-13 RX ADMIN — HEPARIN SODIUM 5000 UNIT(S): 5000 INJECTION INTRAVENOUS; SUBCUTANEOUS at 15:59

## 2019-03-13 RX ADMIN — Medication 1 MILLIGRAM(S): at 11:58

## 2019-03-13 RX ADMIN — DAPTOMYCIN 128 MILLIGRAM(S): 500 INJECTION, POWDER, LYOPHILIZED, FOR SOLUTION INTRAVENOUS at 19:02

## 2019-03-13 RX ADMIN — MEROPENEM 100 MILLIGRAM(S): 1 INJECTION INTRAVENOUS at 06:59

## 2019-03-13 RX ADMIN — CYCLOSPORINE 75 MILLIGRAM(S): 100 CAPSULE ORAL at 11:58

## 2019-03-13 RX ADMIN — CYCLOSPORINE 75 MILLIGRAM(S): 100 CAPSULE ORAL at 21:43

## 2019-03-13 RX ADMIN — ATORVASTATIN CALCIUM 10 MILLIGRAM(S): 80 TABLET, FILM COATED ORAL at 21:40

## 2019-03-13 RX ADMIN — Medication 500000 UNIT(S): at 16:00

## 2019-03-13 RX ADMIN — Medication 1 TABLET(S): at 11:57

## 2019-03-13 NOTE — PROGRESS NOTE ADULT - ATTENDING COMMENTS
77 yo female followed for multiple wounds 2/2 PG, which followed explant of right TKR about 1 yr ago  I spoke with patient's  by phone yesterday  He is a pharmacist, still in practice, who called for an update on wife's status  At least one other son is also a Pharmacist   Patient is using nasal O2 at bedrest, with non labored respirations  Patient's condition is characterized by worsening encephalopathy , renal failure ,  wound status, and generalized edema  Patient remains non ambulatory , morbidly obese, but with less obvious moon facies, who is also  incontinent of stool, precluding use of Prima Fit catheter  Both lateral thighs, left flank, and buttocks affected primarily by scattered large wounds , most of which are covered with a thick, dry eschar, and several of which have at least a partially liquified eschar. One such partially liquified  wound of right buttock was debrided with a scissors to level of SQ tissue , an excisional debridement. This  was done at bedside and tissue submitted for histology.  Turning and positioning, and cleaning stool required  multiple staff members, with protests from the patient, unchanged from previous encounters    I will speak with  regarding wife's status   Seen by palliative yesterday

## 2019-03-13 NOTE — PROGRESS NOTE ADULT - SUBJECTIVE AND OBJECTIVE BOX
CC: f/u for coag negative staph bacteremia    Patient had no fevers, more awake, still very weak    REVIEW OF SYSTEMS:  All other review of systems negative (Comprehensive ROS)    Antimicrobials Day #  :27/42  DAPTOmycin IVPB 700 milliGRAM(s) IV Intermittent every 24 hours  meropenem  IVPB 1000 milliGRAM(s) IV Intermittent every 12 hours  nystatin    Suspension 331284 Unit(s) Swish and Swallow three times a day      Other Medications Reviewed    Vital Signs Last 24 Hrs  T(C): 36.8 (13 Mar 2019 07:39), Max: 37.3 (12 Mar 2019 16:18)  T(F): 98.2 (13 Mar 2019 07:39), Max: 99.2 (12 Mar 2019 16:18)  HR: 92 (13 Mar 2019 07:39) (92 - 99)  BP: 126/76 (13 Mar 2019 07:39) (107/67 - 126/76)  BP(mean): --  RR: 18 (13 Mar 2019 07:39) (18 - 20)  SpO2: 97% (13 Mar 2019 07:39) (96% - 98%)    PHYSICAL EXAM:  General: alert, no acute distress, morbidly obese  Eyes:  anicteric, no conjunctival injection, no discharge  Oropharynx: no lesions or injection 	  Neck: supple, without adenopathy  Lungs: clear to auscultation  Heart: regular rate and rhythm; no murmur, rubs or gallops  Abdomen: soft, nondistended, nontender, without mass or organomegaly  Skin: flank and back wounds dressed  Extremities: no clubbing, cyanosis, or edema  Neurologic: alert, oriented, moves all extremities      LAB RESULTS:                                                   7.2    25.0  )-----------( 252      ( 12 Mar 2019 07:35 )             22.7   03-13    143  |  107  |  42<H>  ----------------------------<  86  4.5   |  24  |  1.96<H>    Ca    8.2<L>      13 Mar 2019 07:48          MICROBIOLOGY:  RECENT CULTURES:  Culture - Blood (03.09.19 @ 16:44)    Specimen Source: .Blood Blood-Venous    Culture Results:   No growth to date.        RADIOLOGY REVIEWED:    < from: CT Chest No Cont (03.10.19 @ 20:01) >    1.  There are bilateral pleural effusions.  2.  Passive atelectasis from bilateral pleural effusions.    < end of copied text >

## 2019-03-13 NOTE — PROGRESS NOTE ADULT - ASSESSMENT
· Assessment		  78 F PMH of CAD s/p stent to LAD, AS s/p TAVR, PPM, DVT (Jan '18) on coumadin, IVC filter, pyoderma gangrenosum, hx MRSA infections, and total knee replacement c/b joint infections s/p I&D explantation and spacer placement followed by spacer removal and static spacer placement with joint fusion due to infection of initial spacer with complex wound closure by plastic surgery, admission here in November 2018 for hip and thigh wounds, now presenting with confusion and chills at home associated with poor appetite and confusion x 1 week and not eating x 2 days. Noted with fever here, YONATAN on labwork. R knee evaluated by orthopedic surgery.	    Problem/Plan - 1:  ·  Problem: Bacteremia.  Plan: 2' S epidermidis  Daptomycin last day is 3/28/19; PICC placed 3/6/19  blood cultures from 2/28, 3/9 are negative.  ID f/u appreciated  TTE without discrete vegetation  Too high-risk for ERIKA  CT chest 2/11 revealing b/l pleural effusions  Poor prognosis overall.    Problem/Plan - 2:  ·  Problem: Iron deficiency anemia.  Plan: in setting of CK3  s/p venofer 200 mg on 3/7 and 3/8  s/p 1 unit of pRBC on 3/9  daily CBC.     Problem/Plan - 3:  ·  Problem: Pyoderma gangrenosum.  Plan: cyclosporine temporarily held on admission, but later restarted.   Cyclosporine dose adjusted to 100 mg BID based on levels  completed chronic steroids taper for pyoderma.  (she was on Prednisone 5 mg qdaily, tapered off)  Per orthopedic surgery can be OOB primarily to wheelchair and can 50% weight  wound care consult and wound vac management appreciated.     Problem/Plan - 4:  ·  Problem: Altered mental status, unspecified altered mental status type.  Plan: likely 2/2 metabolic encephalopathy given fever and concern for cellulitis  mental status stable  Patient also on narcotics but stable on current regimen.   c/w oxycodone to q6hr PRN.  Per the son, she gets q4hr sometime at home if pain is severe.   she is on home gabapentin 300 mg BID, neurontin is completely off for now    Problem/Plan - 5:  ·  Problem: Acute kidney failure.  Plan: likely cardiorenal. intravascular depleted but grossly volume overloaded.   poor PO intake.   renal appreciated  Holding off diuretics for now  Repeat urine labs and bladder scan ordered 3/12/19    Problem/Plan - 6:  Problem: Coronary artery disease involving native coronary artery of native heart without angina pectoris. Plan: Cont Atovastatin 10 mg QHS.    Problem/Plan - 7:  ·  Problem: Aortic stenosis, severe.  Plan: Pt with severe AS, need to be cautious with fluids.     Problem/Plan - 8:  ·  Problem: Gastroesophageal reflux disease, esophagitis presence not specified.  Plan: Protonix.     Problem/Plan - 9:  ·  Problem: Depression, unspecified depression type.    c/w low dose Lexapro 5 mg.     Problem/Plan - 10:  Problem: Need for prophylactic measure. Plan; SC heparin 5000 units q8h.    Problem/Plan - 11:  ·  Problem: poor prognosis.  Appreciate palliative assistance as family members do not seem to grasp complexity of pt's condition.  Given history,  will likely take her home. Numerous conversations held in the past.

## 2019-03-13 NOTE — PROGRESS NOTE ADULT - SUBJECTIVE AND OBJECTIVE BOX
Patient seen and examined  no complaints    amoxicillin (Other)  IV Contrast (Flushing (Skin); Nausea)    Hospital Medications:   MEDICATIONS  (STANDING):  ascorbic acid 500 milliGRAM(s) Oral daily  aspirin enteric coated 81 milliGRAM(s) Oral daily  atorvastatin 10 milliGRAM(s) Oral at bedtime  bisacodyl 5 milliGRAM(s) Oral at bedtime  calcium carbonate 1250 mG  + Vitamin D (OsCal 500 + D) 1 Tablet(s) Oral three times a day  cholecalciferol 1000 Unit(s) Oral daily  cycloSPORINE  (SandIMMUNE)  Solution 75 milliGRAM(s) Oral two times a day  Dakins Solution - 1/4 Strength 1 Application(s) Topical two times a day  DAPTOmycin IVPB 700 milliGRAM(s) IV Intermittent every 24 hours  escitalopram 5 milliGRAM(s) Oral daily  folic acid 1 milliGRAM(s) Oral daily  heparin  Injectable 5000 Unit(s) SubCutaneous every 8 hours  meropenem  IVPB 1000 milliGRAM(s) IV Intermittent every 12 hours  metoprolol tartrate 12.5 milliGRAM(s) Oral two times a day  nystatin    Suspension 780581 Unit(s) Swish and Swallow three times a day  polyethylene glycol 3350 17 Gram(s) Oral daily  vitamin B complex with vitamin C 1 Tablet(s) Oral daily        VITALS:  T(F): 98.2 (03-13-19 @ 07:39), Max: 99.2 (03-12-19 @ 16:18)  HR: 92 (03-13-19 @ 07:39)  BP: 126/76 (03-13-19 @ 07:39)  RR: 18 (03-13-19 @ 07:39)  SpO2: 97% (03-13-19 @ 07:39)  Wt(kg): --    03-12 @ 07:01  -  03-13 @ 07:00  --------------------------------------------------------  IN: 400 mL / OUT: 680 mL / NET: -280 mL    03-13 @ 07:01  -  03-13 @ 14:11  --------------------------------------------------------  IN: 400 mL / OUT: 0 mL / NET: 400 mL        Physical Exam :-  Constitutional: NAD  Neck: Supple.  Respiratory: Bilateral equal breath sounds,  Cardiovascular: S1, S2 normal,  Gastrointestinal: Bowel Sounds present, soft, non tender.  Extremities: Trace edema  Neurological: Confused      LABS:  03-13    143  |  107  |  42<H>  ----------------------------<  86  4.5   |  24  |  1.96<H>    Ca    8.2<L>      13 Mar 2019 07:48      Creatinine Trend: 1.96 <--, 2.07 <--, 1.87 <--, 1.73 <--, 1.79 <--, 2.00 <--, 1.96 <--                        7.4    29.81 )-----------( 284      ( 13 Mar 2019 13:27 )             25.9     Urine Studies:      RADIOLOGY & ADDITIONAL STUDIES:

## 2019-03-13 NOTE — PROGRESS NOTE ADULT - ASSESSMENT
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  F/u blood cultures 2/14 are negative  New L LE DVT noted (IVC filter already in place)  Leukocytosis noted, has been rising over past week, now also with 10% bands  Suggest:  anticipate a 6  wk course of Dapto via PICC, day 27/42   If creatinine cont to rise will need change dapto to q48  Local wound care  f/u repeat blood cult  CT chest reviewed  cont Meropenem in addition to Dapto, leukocytosis seems moderating slowly  prognosis seems poor  Goals of care need to be addressed

## 2019-03-13 NOTE — PROGRESS NOTE ADULT - NSICDXPROBLEM_GEN_ALL_CORE_FT
PROBLEM DIAGNOSES  Problem: Hypernatremia  Assessment and Plan: off IVF  encourag po intake  trend na    Problem: Edema  Assessment and Plan: off diuretics  off ivf  monitor    Problem: Acute kidney injury superimposed on CKD  Assessment and Plan: cr relatively stable  but higher- ddx includes pre renal vs atn from sepsis/hypotension/anemia vs obstruction  check ua  check urine na/cr/cl/osm/k  straight cath for urine sample if unable to obtain  check ck level as pt is on daptomycin  encourage po intake  trend bmp  avoid nephrotoxins

## 2019-03-13 NOTE — PROGRESS NOTE ADULT - SUBJECTIVE AND OBJECTIVE BOX
SUBJECTIVE: Pt seen, chart reviewed.    Allergies    amoxicillin (Other)    Intolerances    IV Contrast (Flushing (Skin); Nausea)      aspirin enteric coated 81 milliGRAM(s) Oral daily  DAPTOmycin IVPB 700 milliGRAM(s) IV Intermittent every 24 hours  heparin  Injectable 5000 Unit(s) SubCutaneous every 8 hours  meropenem  IVPB 1000 milliGRAM(s) IV Intermittent every 12 hours  nystatin    Suspension 640686 Unit(s) Swish and Swallow three times a day      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  Artificial cardiac pacemaker: 12/25/17  H/O cardiac catheterization: 11/29/16 HERBERT placed on LAD  H/O endoscopy: with dilatation of esophageal stricture 2013  S/P cataract surgery: x2; 3-4yr ago  S/P gastroplasty: 28 yrs ago  S/P cholecystectomy: more than 15 years ago  S/P total knee replacement: left, 15yr ago  S/P total knee replacement: right, 12yr ago  S/P hysterectomy: 24yr ago      HEALTH ISSUES - PROBLEM Dx:  Need for prophylactic measure: Need for prophylactic measure  Hypernatremia: Hypernatremia  Edema: Edema  Bacteremia: Bacteremia  Iron deficiency anemia: Iron deficiency anemia  Hyperkalemia: Hyperkalemia  Acute kidney injury superimposed on CKD: Acute kidney injury superimposed on CKD  Acute kidney failure: Acute kidney failure  Pyoderma gangrenosum: Pyoderma gangrenosum  Depression, unspecified depression type: Depression, unspecified depression type  Medication management: Medication management  Gastroesophageal reflux disease, esophagitis presence not specified: Gastroesophageal reflux disease, esophagitis presence not specified  Aortic stenosis, severe: Aortic stenosis, severe  Coronary artery disease involving native coronary artery of native heart without angina pectoris: Coronary artery disease involving native coronary artery of native heart without angina pectoris  Altered mental status, unspecified altered mental status type: Altered mental status, unspecified altered mental status type  Cellulitis of leg, right: Cellulitis of leg, right          FAMILY HISTORY:  No pertinent family history in first degree relatives      Physical Exam:  Vital Signs Last 24 Hrs  T(C): 36.8 (13 Mar 2019 07:39), Max: 37.3 (12 Mar 2019 16:18)  T(F): 98.2 (13 Mar 2019 07:39), Max: 99.2 (12 Mar 2019 16:18)  HR: 92 (13 Mar 2019 07:39) (92 - 99)  BP: 126/76 (13 Mar 2019 07:39) (107/67 - 126/76)  BP(mean): --  RR: 18 (13 Mar 2019 07:39) (18 - 20)  SpO2: 97% (13 Mar 2019 07:39) (96% - 98%)    LABS:                        7.2    25.0  )-----------( 252      ( 12 Mar 2019 07:35 )             22.7           Outpatient follow up information provided- [phone]

## 2019-03-13 NOTE — PROGRESS NOTE ADULT - SUBJECTIVE AND OBJECTIVE BOX
Patient is a 78y old  Female who presents with a chief complaint of Cellulitis (13 Mar 2019 14:11)      SUBJECTIVE / OVERNIGHT EVENTS:  awake today.  the aide at bedside.  no cp,no sob.  ate a little bit.  arm edema much improved.       Vital Signs Last 24 Hrs  T(C): 36.8 (13 Mar 2019 07:39), Max: 37.3 (12 Mar 2019 16:18)  T(F): 98.2 (13 Mar 2019 07:39), Max: 99.2 (12 Mar 2019 16:18)  HR: 92 (13 Mar 2019 07:39) (92 - 99)  BP: 126/76 (13 Mar 2019 07:39) (107/67 - 126/76)  BP(mean): --  RR: 18 (13 Mar 2019 07:39) (18 - 20)  SpO2: 97% (13 Mar 2019 07:39) (96% - 98%)  I&O's Summary    12 Mar 2019 07:01  -  13 Mar 2019 07:00  --------------------------------------------------------  IN: 400 mL / OUT: 680 mL / NET: -280 mL    13 Mar 2019 07:01  -  13 Mar 2019 14:15  --------------------------------------------------------  IN: 400 mL / OUT: 0 mL / NET: 400 mL        PHYSICAL EXAM:  GENERAL: comfortable, lying in bed, awake alert, morbidly obese  HEAD:  Atraumatic, Normocephalic  EYES: EOMI, PERRLA, conjunctiva and sclera clear  NECK: Supple, No JVD  CHEST/LUNG: mild decrease breath sounds bilaterally; No wheeze   HEART: Regular rate and rhythm; No murmurs, rubs, or gallops  ABDOMEN: Soft, Nontender, Nondistended; Bowel sounds present  Neuro: lethargy prevents utility of this exam  EXTREMITIES:  2+ Peripheral Pulses, No clubbing, cyanosis.  2+ nonpitting edema LE, UE trace edema (much improved)  Back: sacral and thigh wound. +wound vac in place  SKIN: No rashes or lesions      LABS:                        7.4    29.81 )-----------( 284      ( 13 Mar 2019 13:27 )             25.9     03-13    143  |  107  |  42<H>  ----------------------------<  86  4.5   |  24  |  1.96<H>    Ca    8.2<L>      13 Mar 2019 07:48        CAPILLARY BLOOD GLUCOSE        CARDIAC MARKERS ( 13 Mar 2019 07:48 )  x     / x     / 28 U/L / x     / x              RADIOLOGY & ADDITIONAL TESTS:    Imaging Personally Reviewed:  [x] YES  [ ] NO    Consultant(s) Notes Reviewed:  [x] YES  [ ] NO      MEDICATIONS  (STANDING):  ascorbic acid 500 milliGRAM(s) Oral daily  aspirin enteric coated 81 milliGRAM(s) Oral daily  atorvastatin 10 milliGRAM(s) Oral at bedtime  bisacodyl 5 milliGRAM(s) Oral at bedtime  calcium carbonate 1250 mG  + Vitamin D (OsCal 500 + D) 1 Tablet(s) Oral three times a day  cholecalciferol 1000 Unit(s) Oral daily  cycloSPORINE  (SandIMMUNE)  Solution 75 milliGRAM(s) Oral two times a day  Dakins Solution - 1/4 Strength 1 Application(s) Topical two times a day  DAPTOmycin IVPB 700 milliGRAM(s) IV Intermittent every 24 hours  escitalopram 5 milliGRAM(s) Oral daily  folic acid 1 milliGRAM(s) Oral daily  heparin  Injectable 5000 Unit(s) SubCutaneous every 8 hours  meropenem  IVPB 1000 milliGRAM(s) IV Intermittent every 12 hours  metoprolol tartrate 12.5 milliGRAM(s) Oral two times a day  nystatin    Suspension 735907 Unit(s) Swish and Swallow three times a day  polyethylene glycol 3350 17 Gram(s) Oral daily  vitamin B complex with vitamin C 1 Tablet(s) Oral daily    MEDICATIONS  (PRN):  acetaminophen   Tablet .. 650 milliGRAM(s) Oral every 6 hours PRN Mild Pain (1 - 3)  acetaminophen   Tablet .. 650 milliGRAM(s) Oral every 6 hours PRN Temp greater or equal to 38C (100.4F)  melatonin 3 milliGRAM(s) Oral at bedtime PRN Insomnia  oxyCODONE    5 mG/acetaminophen 325 mG 1 Tablet(s) Oral every 6 hours PRN Severe Pain (7 - 10)      Care Discussed with Consultants/Other Providers [x] YES  [ ] NO    HEALTH ISSUES - PROBLEM Dx:  Need for prophylactic measure: Need for prophylactic measure  Hypernatremia: Hypernatremia  Edema: Edema  Bacteremia: Bacteremia  Iron deficiency anemia: Iron deficiency anemia  Hyperkalemia: Hyperkalemia  Acute kidney injury superimposed on CKD: Acute kidney injury superimposed on CKD  Acute kidney failure: Acute kidney failure  Pyoderma gangrenosum: Pyoderma gangrenosum  Depression, unspecified depression type: Depression, unspecified depression type  Medication management: Medication management  Gastroesophageal reflux disease, esophagitis presence not specified: Gastroesophageal reflux disease, esophagitis presence not specified  Aortic stenosis, severe: Aortic stenosis, severe  Coronary artery disease involving native coronary artery of native heart without angina pectoris: Coronary artery disease involving native coronary artery of native heart without angina pectoris  Altered mental status, unspecified altered mental status type: Altered mental status, unspecified altered mental status type  Cellulitis of leg, right: Cellulitis of leg, right

## 2019-03-14 LAB
ANION GAP SERPL CALC-SCNC: 12 MMOL/L — SIGNIFICANT CHANGE UP (ref 5–17)
APPEARANCE UR: ABNORMAL
BACTERIA # UR AUTO: ABNORMAL
BILIRUB UR-MCNC: NEGATIVE — SIGNIFICANT CHANGE UP
BUN SERPL-MCNC: 49 MG/DL — HIGH (ref 7–23)
CALCIUM SERPL-MCNC: 8 MG/DL — LOW (ref 8.4–10.5)
CHLORIDE SERPL-SCNC: 109 MMOL/L — HIGH (ref 96–108)
CO2 SERPL-SCNC: 24 MMOL/L — SIGNIFICANT CHANGE UP (ref 22–31)
COLOR SPEC: ABNORMAL
CREAT ?TM UR-MCNC: 142 MG/DL — SIGNIFICANT CHANGE UP
CREAT SERPL-MCNC: 2.11 MG/DL — HIGH (ref 0.5–1.3)
CULTURE RESULTS: SIGNIFICANT CHANGE UP
DIFF PNL FLD: SIGNIFICANT CHANGE UP
EPI CELLS # UR: >27 /HPF — HIGH (ref 0–5)
GLUCOSE BLDC GLUCOMTR-MCNC: 121 MG/DL — HIGH (ref 70–99)
GLUCOSE BLDC GLUCOMTR-MCNC: 68 MG/DL — LOW (ref 70–99)
GLUCOSE SERPL-MCNC: 69 MG/DL — LOW (ref 70–99)
GLUCOSE UR QL: NEGATIVE — SIGNIFICANT CHANGE UP
HCT VFR BLD CALC: 24.8 % — LOW (ref 34.5–45)
HGB BLD-MCNC: 7.7 G/DL — LOW (ref 11.5–15.5)
HYALINE CASTS # UR AUTO: 0 /LPF — SIGNIFICANT CHANGE UP (ref 0–7)
KETONES UR-MCNC: NEGATIVE — SIGNIFICANT CHANGE UP
LEUKOCYTE ESTERASE UR-ACNC: ABNORMAL
MCHC RBC-ENTMCNC: 27.9 PG — SIGNIFICANT CHANGE UP (ref 27–34)
MCHC RBC-ENTMCNC: 31.2 GM/DL — LOW (ref 32–36)
MCV RBC AUTO: 89.4 FL — SIGNIFICANT CHANGE UP (ref 80–100)
NITRITE UR-MCNC: NEGATIVE — SIGNIFICANT CHANGE UP
PH UR: 5.5 — SIGNIFICANT CHANGE UP (ref 5–8)
PLATELET # BLD AUTO: 225 K/UL — SIGNIFICANT CHANGE UP (ref 150–400)
POTASSIUM SERPL-MCNC: 4.6 MMOL/L — SIGNIFICANT CHANGE UP (ref 3.5–5.3)
POTASSIUM SERPL-SCNC: 4.6 MMOL/L — SIGNIFICANT CHANGE UP (ref 3.5–5.3)
PROT ?TM UR-MCNC: 57 MG/DL — HIGH (ref 0–12)
PROT UR-MCNC: ABNORMAL
PROT/CREAT UR-RTO: 0.4 RATIO — HIGH (ref 0–0.2)
RBC # BLD: 2.77 M/UL — LOW (ref 3.8–5.2)
RBC # FLD: 18.4 % — HIGH (ref 10.3–14.5)
RBC CASTS # UR COMP ASSIST: 8 /HPF — HIGH (ref 0–4)
SODIUM SERPL-SCNC: 145 MMOL/L — SIGNIFICANT CHANGE UP (ref 135–145)
SP GR SPEC: 1.02 — SIGNIFICANT CHANGE UP (ref 1.01–1.02)
SPECIMEN SOURCE: SIGNIFICANT CHANGE UP
UROBILINOGEN FLD QL: ABNORMAL
UUN UR-MCNC: 403 MG/DL — SIGNIFICANT CHANGE UP
WBC # BLD: 26.7 K/UL — HIGH (ref 3.8–10.5)
WBC # FLD AUTO: 26.7 K/UL — HIGH (ref 3.8–10.5)
WBC UR QL: 22 /HPF — HIGH (ref 0–5)

## 2019-03-14 PROCEDURE — 99233 SBSQ HOSP IP/OBS HIGH 50: CPT

## 2019-03-14 RX ORDER — ACETAMINOPHEN 500 MG
1000 TABLET ORAL ONCE
Qty: 0 | Refills: 0 | Status: COMPLETED | OUTPATIENT
Start: 2019-03-14 | End: 2019-03-19

## 2019-03-14 RX ORDER — SODIUM CHLORIDE 9 MG/ML
1000 INJECTION, SOLUTION INTRAVENOUS
Qty: 0 | Refills: 0 | Status: DISCONTINUED | OUTPATIENT
Start: 2019-03-14 | End: 2019-03-15

## 2019-03-14 RX ORDER — DEXTROSE 50 % IN WATER 50 %
50 SYRINGE (ML) INTRAVENOUS ONCE
Qty: 0 | Refills: 0 | Status: COMPLETED | OUTPATIENT
Start: 2019-03-14 | End: 2019-03-14

## 2019-03-14 RX ORDER — SODIUM CHLORIDE 9 MG/ML
1000 INJECTION, SOLUTION INTRAVENOUS
Qty: 0 | Refills: 0 | Status: DISCONTINUED | OUTPATIENT
Start: 2019-03-14 | End: 2019-03-14

## 2019-03-14 RX ADMIN — SODIUM CHLORIDE 60 MILLILITER(S): 9 INJECTION, SOLUTION INTRAVENOUS at 15:10

## 2019-03-14 RX ADMIN — SODIUM CHLORIDE 60 MILLILITER(S): 9 INJECTION, SOLUTION INTRAVENOUS at 15:49

## 2019-03-14 RX ADMIN — DAPTOMYCIN 128 MILLIGRAM(S): 500 INJECTION, POWDER, LYOPHILIZED, FOR SOLUTION INTRAVENOUS at 20:21

## 2019-03-14 RX ADMIN — Medication 500000 UNIT(S): at 20:27

## 2019-03-14 RX ADMIN — ATORVASTATIN CALCIUM 10 MILLIGRAM(S): 80 TABLET, FILM COATED ORAL at 20:27

## 2019-03-14 RX ADMIN — Medication 1 TABLET(S): at 05:59

## 2019-03-14 RX ADMIN — Medication 5 MILLIGRAM(S): at 20:27

## 2019-03-14 RX ADMIN — HEPARIN SODIUM 5000 UNIT(S): 5000 INJECTION INTRAVENOUS; SUBCUTANEOUS at 05:59

## 2019-03-14 RX ADMIN — MEROPENEM 100 MILLIGRAM(S): 1 INJECTION INTRAVENOUS at 05:58

## 2019-03-14 RX ADMIN — OXYCODONE AND ACETAMINOPHEN 1 TABLET(S): 5; 325 TABLET ORAL at 08:50

## 2019-03-14 RX ADMIN — Medication 1 APPLICATION(S): at 20:21

## 2019-03-14 RX ADMIN — HEPARIN SODIUM 5000 UNIT(S): 5000 INJECTION INTRAVENOUS; SUBCUTANEOUS at 20:27

## 2019-03-14 RX ADMIN — Medication 500000 UNIT(S): at 05:59

## 2019-03-14 RX ADMIN — CYCLOSPORINE 75 MILLIGRAM(S): 100 CAPSULE ORAL at 20:28

## 2019-03-14 RX ADMIN — MEROPENEM 100 MILLIGRAM(S): 1 INJECTION INTRAVENOUS at 18:43

## 2019-03-14 RX ADMIN — Medication 1 APPLICATION(S): at 07:51

## 2019-03-14 RX ADMIN — Medication 12.5 MILLIGRAM(S): at 05:59

## 2019-03-14 RX ADMIN — OXYCODONE AND ACETAMINOPHEN 1 TABLET(S): 5; 325 TABLET ORAL at 07:50

## 2019-03-14 RX ADMIN — Medication 1 TABLET(S): at 20:27

## 2019-03-14 RX ADMIN — Medication 50 MILLILITER(S): at 15:49

## 2019-03-14 NOTE — CHART NOTE - NSCHARTNOTEFT_GEN_A_CORE
I sat down with pt's  Yonatan, the grandson Braden and the aide.  Explained the family her overall condition and poor prognosis given multiple comorbidities: chronically bed bound, CKD, bacetremia, pyroderma, poor nutrition, depression, etc.    Aggressive care and comfort option explained.   Her situation is difficult given poor nutrition and difficulty balancing and treating her intravascular depletion vs. gross volume overload.  Poor nutrition with hypoglycemia, started on d51/2NS. BP and sugars stabilized.   The  expresses his understanding of her poor prognosis but remain hopeful.  He states that he would not want CPR or intubation but will not sign any form at the moment. He needs to speak with his children who are [place].   He understands that if anything were to happen she will be intubated.   Remained full code for now.  states to call him if anything happen.   Many questions answered until satisfaction.   If aggressive option is chosen, the  and the grandson understands that pt will likely be in and out of the hospital many times.   Quality of life issues discussed.   Palliative eval. Both  and the grandson agree to discuss with palliative team.  d/w MARSHA Suarez.     More than 40 mins were discussing advance directives.    - Dr. DENISSE Palumbo ([facility])  - [phone]

## 2019-03-14 NOTE — PROGRESS NOTE ADULT - ASSESSMENT
· Assessment		  78 F PMH of CAD s/p stent to LAD, AS s/p TAVR, PPM, DVT (Jan '18) on coumadin, IVC filter, pyoderma gangrenosum, hx MRSA infections, and total knee replacement c/b joint infections s/p I&D explantation and spacer placement followed by spacer removal and static spacer placement with joint fusion due to infection of initial spacer with complex wound closure by plastic surgery, admission here in November 2018 for hip and thigh wounds, now presenting with confusion and chills at home associated with poor appetite and confusion x 1 week and not eating x 2 days. Noted with fever here, YONATAN on labwork. R knee evaluated by orthopedic surgery.	    Problem/Plan - 1:  ·  Problem: Bacteremia.  Plan: 2' S epidermidis  Daptomycin last day is 3/28/19; PICC placed 3/6/19  blood cultures from 2/28, 3/9 are negative.  ID f/u appreciated  TTE without discrete vegetation  Too high-risk for ERIKA  CT chest 2/11 revealing b/l pleural effusions  Poor prognosis overall. Palliative on the case.  Day 28/42 abx today.  If  refused hospice, then likely dc planning home with home care.       Problem/Plan - 2:  ·  Problem: Iron deficiency anemia.  Plan: in setting of CK3  s/p venofer 200 mg on 3/7 and 3/8  s/p 1 unit of pRBC on 3/9  daily CBC.     Problem/Plan - 3:  ·  Problem: Pyoderma gangrenosum.  Plan: cyclosporine temporarily held on admission, but later restarted.   Cyclosporine dose adjusted to 100 mg BID based on levels  completed chronic steroids taper for pyoderma.  (she was on Prednisone 5 mg qdaily, tapered off)  Per orthopedic surgery can be OOB primarily to wheelchair and can 50% weight  wound care consult and wound vac management appreciated.     Problem/Plan - 4:  ·  Problem: Altered mental status, unspecified altered mental status type.  Plan: likely 2/2 metabolic encephalopathy given fever and concern for cellulitis  mental status stable  Patient also on narcotics but stable on current regimen.   c/w oxycodone to q6hr PRN.  Per the son, she gets q4hr sometime at home if pain is severe.   she is on home gabapentin 300 mg BID, neurontin tapered down to 100 mg bid and she is completely off for now due to lethargy.     Problem/Plan - 5:  ·  Problem: Acute kidney failure.  Plan: likely cardiorenal. intravascular depleted but grossly volume overloaded.   poor PO intake.   renal appreciated  Holding off diuretics for now  Repeat urine labs and bladder scan ordered 3/12/19    Problem/Plan - 6:  Problem: Coronary artery disease involving native coronary artery of native heart without angina pectoris. Plan: Cont Atovastatin 10 mg QHS.    Problem/Plan - 7:  ·  Problem: Aortic stenosis, severe.  Plan: Pt with severe AS, need to be cautious with fluids.     Problem/Plan - 8:  ·  Problem: Gastroesophageal reflux disease, esophagitis presence not specified.  Plan: Protonix.     Problem/Plan - 9:  ·  Problem: Depression, unspecified depression type.    c/w low dose Lexapro 5 mg.     Problem/Plan - 10:  Problem: Need for prophylactic measure. Plan; SC heparin 5000 units q8h.    Problem/Plan - 11:  ·  Problem: poor prognosis.  Appreciate palliative assistance as family members do not seem to grasp complexity of pt's condition.  Given history,  will likely take her home. Numerous conversations held in the past.

## 2019-03-14 NOTE — PROVIDER CONTACT NOTE (OTHER) - SITUATION
Pt febrile
As above
Pt only took half of cyclosporin dose due at 1800, Pt due for 2 100mg capsules, only took one capsule
Pt. has elevated creatinine low grade temp, multiple wounds
same as above, hgb 7.8 hct 24.2

## 2019-03-14 NOTE — PROGRESS NOTE ADULT - SUBJECTIVE AND OBJECTIVE BOX
CC: f/u for coag negative staph bacteremia    Patient reports: she has been more lethargic past 24-48 hours.No fever, receiving fluids for hypotension.Poor po intake    REVIEW OF SYSTEMS:  All other review of systems negative (Comprehensive ROS)    Antimicrobials Day #  :  DAPTOmycin IVPB 700 milliGRAM(s) IV Intermittent every 24 hours   meropenem  IVPB 1000 milliGRAM(s) IV Intermittent every 12 hours day 3  nystatin    Suspension 521222 Unit(s) Swish and Swallow three times a day     Other Medications Reviewed    T(F): 98.7 (19 @ 15:13), Max: 99.6 (19 @ 08:50)  HR: 96 (19 @ 15:13)  BP: 100/54 (19 @ 15:28)  RR: 22 (19 @ 15:13)  SpO2: 94% (19 @ 15:13)  Wt(kg): --    PHYSICAL EXAM:  General: lethargic,, no acute distress  Eyes:  anicteric, no conjunctival injection, no discharge  Oropharynx: no lesions or injection 	  Neck: supple, without adenopathy  Lungs: clear to auscultation, diminished at bases  Heart: regular rate and rhythm; no murmur, rubs or gallops  Abdomen: soft, nondistended, nontender, without mass or organomegaly  Skin: wounds dressed  Extremities: no clubbing, cyanosis, + edema  Neurologic: lethargic, poorly interactive    LAB RESULTS:                        7.4    29.81 )-----------( 284      ( 13 Mar 2019 13:27 )             25.9     -    143  |  107  |  42<H>  ----------------------------<  86  4.5   |  24  |  1.96<H>    Ca    8.2<L>      13 Mar 2019 07:48        Urinalysis Basic - ( 13 Mar 2019 21:41 )    Color: Kristel / Appearance: Turbid / S.023 / pH: x  Gluc: x / Ketone: Negative  / Bili: Negative / Urobili: 3 mg/dL   Blood: x / Protein: 30 mg/dL / Nitrite: Negative   Leuk Esterase: Large / RBC: 8 /HPF / WBC 22 /HPF   Sq Epi: x / Non Sq Epi: >27 /HPF / Bacteria: Moderate      MICROBIOLOGY:  RECENT CULTURES:   @ 16:44 .Blood Blood-Venous     No growth to date.          RADIOLOGY REVIEWED:  < from: CT Chest No Cont (03.10.19 @ 20:01) >    IMPRESSION:     1.  There are bilateral pleural effusions.  2.  Passive atelectasis from bilateral pleural effusions.    < end of copied text >

## 2019-03-14 NOTE — PROVIDER CONTACT NOTE (OTHER) - ASSESSMENT
pt in stable condition, no s/s active bleed
Vitals in sunrise
Lab results
Pt resting at bed, aide at bedside, Pt refusing med and to take anything at all PO, RN called  and daughter on speakerphone and both also tried to talk Pt into eating and taking med. Pt refusing and will not give reason why. RN educated Pt on benefits of med and risks of not taking however still refusing
See VS flow sheet

## 2019-03-14 NOTE — PROGRESS NOTE ADULT - SUBJECTIVE AND OBJECTIVE BOX
Patient is a 78y old  Female who presents with a chief complaint of Cellulitis (14 Mar 2019 10:14)      SUBJECTIVE / OVERNIGHT EVENTS:  awake, alert.  no fever.  poor appetite.   no cp, no sob.   no n/v/d.  seen by palliative.       Vital Signs Last 24 Hrs  T(C): 37.6 (14 Mar 2019 08:50), Max: 37.6 (14 Mar 2019 08:50)  T(F): 99.6 (14 Mar 2019 08:50), Max: 99.6 (14 Mar 2019 08:50)  HR: 108 (14 Mar 2019 07:54) (84 - 108)  BP: 114/52 (14 Mar 2019 07:54) (92/51 - 137/68)  BP(mean): --  RR: 22 (14 Mar 2019 07:54) (18 - 22)  SpO2: 99% (14 Mar 2019 07:54) (95% - 99%)  I&O's Summary    13 Mar 2019 07:01  -  14 Mar 2019 07:00  --------------------------------------------------------  IN: 450 mL / OUT: 150 mL / NET: 300 mL      PHYSICAL EXAM:  GENERAL: comfortable, lying in bed, awake alert, morbidly obese  HEAD:  Atraumatic, Normocephalic  EYES: EOMI, PERRLA, conjunctiva and sclera clear  NECK: Supple, No JVD  CHEST/LUNG: mild decrease breath sounds bilaterally; No wheeze   HEART: Regular rate and rhythm; No murmurs, rubs, or gallops  ABDOMEN: Soft, Nontender, Nondistended; Bowel sounds present  Neuro: lethargy prevents utility of this exam  EXTREMITIES:  2+ Peripheral Pulses, No clubbing, cyanosis.  2+ nonpitting edema LE, UE trace edema (much improved)  Back: sacral and thigh wound. +wound vac in place  SKIN: No rashes or lesions      LABS:                        7.4    29.81 )-----------( 284      ( 13 Mar 2019 13:27 )             25.9     03-13    143  |  107  |  42<H>  ----------------------------<  86  4.5   |  24  |  1.96<H>    Ca    8.2<L>      13 Mar 2019 07:48        CAPILLARY BLOOD GLUCOSE        CARDIAC MARKERS ( 13 Mar 2019 07:48 )  x     / x     / 28 U/L / x     / x          Urinalysis Basic - ( 13 Mar 2019 21:41 )    Color: Kristel / Appearance: Turbid / S.023 / pH: x  Gluc: x / Ketone: Negative  / Bili: Negative / Urobili: 3 mg/dL   Blood: x / Protein: 30 mg/dL / Nitrite: Negative   Leuk Esterase: Large / RBC: 8 /HPF / WBC 22 /HPF   Sq Epi: x / Non Sq Epi: >27 /HPF / Bacteria: Moderate        RADIOLOGY & ADDITIONAL TESTS:    Imaging Personally Reviewed:  [x] YES  [ ] NO    Consultant(s) Notes Reviewed:  [x] YES  [ ] NO      MEDICATIONS  (STANDING):  ascorbic acid 500 milliGRAM(s) Oral daily  aspirin enteric coated 81 milliGRAM(s) Oral daily  atorvastatin 10 milliGRAM(s) Oral at bedtime  bisacodyl 5 milliGRAM(s) Oral at bedtime  calcium carbonate 1250 mG  + Vitamin D (OsCal 500 + D) 1 Tablet(s) Oral three times a day  cholecalciferol 1000 Unit(s) Oral daily  cycloSPORINE  (SandIMMUNE)  Solution 75 milliGRAM(s) Oral two times a day  Dakins Solution - 1/4 Strength 1 Application(s) Topical two times a day  DAPTOmycin IVPB 700 milliGRAM(s) IV Intermittent every 24 hours  escitalopram 5 milliGRAM(s) Oral daily  folic acid 1 milliGRAM(s) Oral daily  heparin  Injectable 5000 Unit(s) SubCutaneous every 8 hours  meropenem  IVPB 1000 milliGRAM(s) IV Intermittent every 12 hours  metoprolol tartrate 12.5 milliGRAM(s) Oral two times a day  nystatin    Suspension 763247 Unit(s) Swish and Swallow three times a day  polyethylene glycol 3350 17 Gram(s) Oral daily  vitamin B complex with vitamin C 1 Tablet(s) Oral daily    MEDICATIONS  (PRN):  acetaminophen   Tablet .. 650 milliGRAM(s) Oral every 6 hours PRN Mild Pain (1 - 3)  acetaminophen   Tablet .. 650 milliGRAM(s) Oral every 6 hours PRN Temp greater or equal to 38C (100.4F)  melatonin 3 milliGRAM(s) Oral at bedtime PRN Insomnia  oxyCODONE    5 mG/acetaminophen 325 mG 1 Tablet(s) Oral every 6 hours PRN Severe Pain (7 - 10)      Care Discussed with Consultants/Other Providers [x] YES  [ ] NO    HEALTH ISSUES - PROBLEM Dx:  Need for prophylactic measure: Need for prophylactic measure  Hypernatremia: Hypernatremia  Edema: Edema  Bacteremia: Bacteremia  Iron deficiency anemia: Iron deficiency anemia  Hyperkalemia: Hyperkalemia  Acute kidney injury superimposed on CKD: Acute kidney injury superimposed on CKD  Acute kidney failure: Acute kidney failure  Pyoderma gangrenosum: Pyoderma gangrenosum  Depression, unspecified depression type: Depression, unspecified depression type  Medication management: Medication management  Gastroesophageal reflux disease, esophagitis presence not specified: Gastroesophageal reflux disease, esophagitis presence not specified  Aortic stenosis, severe: Aortic stenosis, severe  Coronary artery disease involving native coronary artery of native heart without angina pectoris: Coronary artery disease involving native coronary artery of native heart without angina pectoris  Altered mental status, unspecified altered mental status type: Altered mental status, unspecified altered mental status type  Cellulitis of leg, right: Cellulitis of leg, right

## 2019-03-14 NOTE — PROGRESS NOTE ADULT - ASSESSMENT
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  F/u blood cultures 2/14 are negative  New L LE DVT noted (IVC filter already in place)  Leukocytosis noted, has been rising over past week, now also with 10% bands  Chest CT with pleural effusions and basilar atelectasis  Meropenem added 3/11 in response to leukocytosis,no impact  Suggest:  anticipate a 6  wk course of Dapto via PICC, day 28/42   If creatinine cont to rise will need change dapto to q48  Local wound care for pyoderma lesions  f/u repeat blood cult  CT chest reviewed  She is failing, ? if she is a candidate for additional w/u  One could obtain a CT of A/P, she was treated last year for appendicitis  One could obtain a ERIKA, better assess heart valves and look for vegetations  prognosis seems poor, palliative care is involved  Goals of care need to be addressed on an ongoing basis although I suspect family will wish for life support systems if necessary  Low threshold for empiric fungal coverage  Repeat blood cultures would be helpful

## 2019-03-14 NOTE — PROGRESS NOTE ADULT - ATTENDING COMMENTS
Problem: Encephalopathy: Multifactorial. Lethargic. Minimal po intake.  Minimal engagement     Problem Bacteremia: LT abx. Plan is for several weeks of treatment. ? PPM/Valve source, unclear  Abx by primary team      Problem Knee replacement. Hx of substantial complications. Ambulation is limited  Wheelchairs is used in the home according to the HHA    Problem Pain  The  is concerned with narcosis with even minimal doses of medication  It appears from chart review and reports by the staff/HHA is that the  assigns a disproportionate amount of blame on the opiates, even if used at low doses or sparingly.    Problem Functional Quadriplegia   Requires full assistance with all ADLs    Problem Pyoderma granulosa. Wounds are open, which were not previously according to the HHA    Problem Palliative Care Encounter  Given collateral information obtained by HHA and staff, it appears that goals of care discussion will be protracted given the reported stance of the  and other family members.  There is no HCP in the chart and according [facility] the [facility], the  will be surrogate until a HCP form can be provided stating otherwise  She has three children, two sons and one daughter  The HHA reports that the daughter is currently hospitalized as well, further contributing to the stress of the .  Consider alpha tab search to determine if previous advance directives exist Problem: Encephalopathy: Multifactorial. Lethargic.   Persistent  Minimal po intake.  Minimal engagement     Problem Bacteremia: LT abx. Plan is for several weeks of treatment. ? PPM/Valve source, unclear  Abx by primary team      Problem Knee replacement. Hx of substantial complications. Ambulation is limited  Wheelchairs is used in the home according to the HHA    Problem Pain  Reports of pain by A  Management by primary team    Problem Functional Quadriplegia   Requires full assistance with all ADLs    Problem Pyoderma granulosa. Wounds are open, which were not previously according to the HHA    Problem Palliative Care Encounter  Will confer with , he is the surrogate until HCP furnished

## 2019-03-14 NOTE — PROGRESS NOTE ADULT - NSICDXPROBLEM_GEN_ALL_CORE_FT
PROBLEM DIAGNOSES  Problem: Hypernatremia  Assessment and Plan: u lytes showing intravascular depletion as well  will benefit for ivf as below  encourag po intake  trend na    Problem: Edema  Assessment and Plan: off diuretics  will give gentle hydration given inc cr /na and dec po intake  monitor    Problem: Acute kidney injury superimposed on CKD  Assessment and Plan: cr relatively stable  but higher- likely Pre Renal given urine lytes  will start on 1/2nacl @ 60cc/hr x 24 hours  check ck level as pt is on daptomycin  encourage po intake  trend bmp  avoid nephrotoxins

## 2019-03-14 NOTE — CHART NOTE - NSCHARTNOTEFT_GEN_A_CORE
MEDICINE NP    Notified by RN patient with lethargic . Seen and examined patient at bedside. Patient is lethargic but opens eyes to tactile stimulus but unable to follow commands.     VITAL SIGNS:  T(C): 37.1 (19 @ 15:13), Max: 37.6 (19 @ 08:50)  HR: 96 (19 @ 15:13) (84 - 108)  BP: 100/54 (19 @ 15:28) (89/55 - 137/68)  RR: 22 (19 @ 15:13) (18 - 22)  SpO2: 94% (19 @ 15:13) (94% - 99%)      MEDICATIONS  (STANDING):  ascorbic acid 500 milliGRAM(s) Oral daily  aspirin enteric coated 81 milliGRAM(s) Oral daily  atorvastatin 10 milliGRAM(s) Oral at bedtime  bisacodyl 5 milliGRAM(s) Oral at bedtime  calcium carbonate 1250 mG  + Vitamin D (OsCal 500 + D) 1 Tablet(s) Oral three times a day  cholecalciferol 1000 Unit(s) Oral daily  cycloSPORINE  (SandIMMUNE)  Solution 75 milliGRAM(s) Oral two times a day  Dakins Solution - 1/4 Strength 1 Application(s) Topical two times a day  DAPTOmycin IVPB 700 milliGRAM(s) IV Intermittent every 24 hours  dextrose 5% + sodium chloride 0.45%. 1000 milliLiter(s) (60 mL/Hr) IV Continuous <Continuous>  escitalopram 5 milliGRAM(s) Oral daily  folic acid 1 milliGRAM(s) Oral daily  heparin  Injectable 5000 Unit(s) SubCutaneous every 8 hours  meropenem  IVPB 1000 milliGRAM(s) IV Intermittent every 12 hours  metoprolol tartrate 12.5 milliGRAM(s) Oral two times a day  nystatin    Suspension 268619 Unit(s) Swish and Swallow three times a day  polyethylene glycol 3350 17 Gram(s) Oral daily  vitamin B complex with vitamin C 1 Tablet(s) Oral daily    MEDICATIONS  (PRN):  acetaminophen   Tablet .. 650 milliGRAM(s) Oral every 6 hours PRN Mild Pain (1 - 3)  acetaminophen   Tablet .. 650 milliGRAM(s) Oral every 6 hours PRN Temp greater or equal to 38C (100.4F)  acetaminophen  IVPB .. 1000 milliGRAM(s) IV Intermittent once PRN Moderate Pain (4 - 6)  melatonin 3 milliGRAM(s) Oral at bedtime PRN Insomnia  oxyCODONE    5 mG/acetaminophen 325 mG 1 Tablet(s) Oral every 6 hours PRN Severe Pain (7 - 10)        LABORATORY:                          7.7    26.7  )-----------( 225      ( 14 Mar 2019 15:39 )             24.8       03-14    145  |  109<H>  |  49<H>  ----------------------------<  69<L>  4.6   |  24  |  2.11<H>    Ca    8.0<L>      14 Mar 2019 15:40        Urinalysis Basic - ( 13 Mar 2019 21:41 )    Color: Kristel / Appearance: Turbid / S.023 / pH: x  Gluc: x / Ketone: Negative  / Bili: Negative / Urobili: 3 mg/dL   Blood: x / Protein: 30 mg/dL / Nitrite: Negative   Leuk Esterase: Large / RBC: 8 /HPF / WBC 22 /HPF   Sq Epi: x / Non Sq Epi: >27 /HPF / Bacteria: Moderate      PHYSICAL EXAM:    Constitutional: Lethargic, NAD    Respiratory: clear lungs bilaterally    Cardiovascular: S1 S2. No murmurs.    Gastrointestinal: BS X4 active    Extremities/Vascular:  BLE edema.      ASSESSMENT/PLAN:   HPI:  78F PMH of CAD s/p stent to LAD, AS s/p TAVR, PPM, DVT () on coumadin, IVC filter, pyoderma gangrenosum, hx MRSA infections, and total knee replacement c/b joint infections s/p I&D explantation and spacer placement followed by spacer removal and static spacer placement with joint fusion due to infection of initial spacer with complex wound closure by plastic surgery, admission here in 2018 for hip and thigh wounds,  Patient with chronic pains of wounds of her hip/thigh as well as chronic R knee pain due to surgeries as above.  than baseline over last 2-3 days but no exudates. Other wounds have been healing well per aide and she has wound vac on R hip and dressings in place on left thigh/glute. now presenting with confusion and chills at home associated with poor appetite and confusion x 1 week and not eating x 2 days. pt with poor prognosis given multiple comorbidities: chronically bed bound, CKD, bacteremia, pyroderma, poor nutrition, depression, etc. Pt wax and wanes, now lethargic     -BG checked and found to be 68  -1 amp D50 x1  -D51/2@60  -CBC, BMP STAT  -Attempted to discuss GOC with  and he became very upset. he states, "I don't want CPR or intubation" but refuse to sign any forms at this time.     Patient remains full code and he want to be called if p should deteriorates.   -Above d/w Dr. Palumbo  -Continue to closely monitor.     Kimberlyn Fonseca NP  [phone]

## 2019-03-14 NOTE — PROGRESS NOTE ADULT - SUBJECTIVE AND OBJECTIVE BOX
Pt seen and examined  Full note to follow Pt still altered  HHA by the bedside    T(C): 37.1 (19 @ 15:13), Max: 37.6 (19 @ 08:50)  HR: 96 (19 @ 15:13) (84 - 108)  BP: 100/54 (19 @ 15:28) (89/55 - 137/68)  RR: 22 (19 @ 15:13) (18 - 22)  SpO2: 94% (19 @ 15:13) (94% - 99%)  Wt(kg): --      13 Mar 2019 07:01  -  14 Mar 2019 07:00  --------------------------------------------------------  IN:    IV PiggyBack: 50 mL    Oral Fluid: 400 mL  Total IN: 450 mL    OUT:    Intermittent Catheterization - Urethral: 150 mL  Total OUT: 150 mL    Total NET: 300 mL           @ 07:01  -   @ 07:00  --------------------------------------------------------  IN: 450 mL / OUT: 150 mL / NET: 300 mL      CAPILLARY BLOOD GLUCOSE      POCT Blood Glucose.: 68 mg/dL (14 Mar 2019 15:33)        acetaminophen   Tablet .. 650 milliGRAM(s) Oral every 6 hours PRN  acetaminophen   Tablet .. 650 milliGRAM(s) Oral every 6 hours PRN  ascorbic acid 500 milliGRAM(s) Oral daily  aspirin enteric coated 81 milliGRAM(s) Oral daily  atorvastatin 10 milliGRAM(s) Oral at bedtime  bisacodyl 5 milliGRAM(s) Oral at bedtime  calcium carbonate 1250 mG  + Vitamin D (OsCal 500 + D) 1 Tablet(s) Oral three times a day  cholecalciferol 1000 Unit(s) Oral daily  cycloSPORINE  (SandIMMUNE)  Solution 75 milliGRAM(s) Oral two times a day  Dakins Solution - 1/4 Strength 1 Application(s) Topical two times a day  DAPTOmycin IVPB 700 milliGRAM(s) IV Intermittent every 24 hours  dextrose 5% + sodium chloride 0.45%. 1000 milliLiter(s) IV Continuous <Continuous>  escitalopram 5 milliGRAM(s) Oral daily  folic acid 1 milliGRAM(s) Oral daily  heparin  Injectable 5000 Unit(s) SubCutaneous every 8 hours  melatonin 3 milliGRAM(s) Oral at bedtime PRN  meropenem  IVPB 1000 milliGRAM(s) IV Intermittent every 12 hours  metoprolol tartrate 12.5 milliGRAM(s) Oral two times a day  nystatin    Suspension 629990 Unit(s) Swish and Swallow three times a day  oxyCODONE    5 mG/acetaminophen 325 mG 1 Tablet(s) Oral every 6 hours PRN  polyethylene glycol 3350 17 Gram(s) Oral daily  vitamin B complex with vitamin C 1 Tablet(s) Oral daily              143  |  107  |  42<H>  ----------------------------<  86  4.5   |  24  |  1.96<H>    Ca    8.2<L>      13 Mar 2019 07:48        Procalc  BNP  ABG                          7.4    29.81 )-----------( 284      ( 13 Mar 2019 13:27 )             25.9         Urinalysis Basic - ( 13 Mar 2019 21:41 )    Color: Kristel / Appearance: Turbid / S.023 / pH: x  Gluc: x / Ketone: Negative  / Bili: Negative / Urobili: 3 mg/dL   Blood: x / Protein: 30 mg/dL / Nitrite: Negative   Leuk Esterase: Large / RBC: 8 /HPF / WBC 22 /HPF   Sq Epi: x / Non Sq Epi: >27 /HPF / Bacteria: Moderate      blood and urine cultures                        PERTINENT PM/SXH:   CAD (coronary artery disease)  Aortic stenosis, severe  Uncomplicated asthma, unspecified asthma severity  Polymyalgia  Lumbar disc disease with radiculopathy  Spider veins  Anxiety  Severe aortic stenosis by prior echocardiogram  Dysphagia  Morbid obesity with BMI of 50.0-59.9, adult  Macular degeneration  Hiatal hernia  GERD (gastroesophageal reflux disease)  Sciatica  Osteoarthritis  HTN (hypertension)    S/P TKR (total knee replacement)  S/P TAVR (transcatheter aortic valve replacement)  Artificial cardiac pacemaker  H/O cardiac catheterization  H/O endoscopy  S/P cataract surgery  S/P gastroplasty  S/P cholecystectomy  S/P total knee replacement  S/P total knee replacement  S/P hysterectomy    FAMILY HISTORY:  No pertinent family history in first degree relatives    ITEMS NOT CHECKED ARE NOT PRESENT    SOCIAL HISTORY:   Significant other/partner:  [x ]  Children:  [x ]  Mandaeism/Spirituality:  Substance hx:  [ ]   Tobacco hx:  [x ]   Alcohol hx: [ ]   Home Opioid hx:  [x ] I-Stop Reference No:  Living Situation: [ x]Home  [ ]Long term care  [ ]Rehab [ ]Other    ADVANCE DIRECTIVES:     MOLST  [ ]  Living Will  [ ]   DECISION MAKER(s):  [ ] Health Care Proxy(s)  [ x] Surrogate(s)    [ ] Guardian           Name(s): Phone Number(s):    BASELINE (I)ADL(s) (prior to admission):  [place]: [ ]Total  [ ] Moderate [ ]Dependent    Allergies    amoxicillin (Other)    Intolerances    IV Contrast (Flushing (Skin); Nausea)  MEDICATIONS  (STANDING):  ascorbic acid 500 milliGRAM(s) Oral daily  aspirin enteric coated 81 milliGRAM(s) Oral daily  atorvastatin 10 milliGRAM(s) Oral at bedtime  bisacodyl 5 milliGRAM(s) Oral at bedtime  calcium carbonate 1250 mG  + Vitamin D (OsCal 500 + D) 1 Tablet(s) Oral three times a day  cholecalciferol 1000 Unit(s) Oral daily  cycloSPORINE  (SandIMMUNE)  Solution 75 milliGRAM(s) Oral two times a day  Dakins Solution - 1/4 Strength 1 Application(s) Topical two times a day  DAPTOmycin IVPB 700 milliGRAM(s) IV Intermittent every 24 hours  dextrose 5%. 1000 milliLiter(s) (100 mL/Hr) IV Continuous <Continuous>  escitalopram 5 milliGRAM(s) Oral daily  folic acid 1 milliGRAM(s) Oral daily  gabapentin 100 milliGRAM(s) Oral two times a day  heparin  Injectable 5000 Unit(s) SubCutaneous every 8 hours  meropenem  IVPB 1000 milliGRAM(s) IV Intermittent every 12 hours  metoprolol tartrate 12.5 milliGRAM(s) Oral two times a day  nystatin    Suspension 147913 Unit(s) Swish and Swallow three times a day  polyethylene glycol 3350 17 Gram(s) Oral daily  vitamin B complex with vitamin C 1 Tablet(s) Oral daily    MEDICATIONS  (PRN):  acetaminophen   Tablet .. 650 milliGRAM(s) Oral every 6 hours PRN Mild Pain (1 - 3)  acetaminophen   Tablet .. 650 milliGRAM(s) Oral every 6 hours PRN Temp greater or equal to 38C (100.4F)  melatonin 3 milliGRAM(s) Oral at bedtime PRN Insomnia  oxyCODONE    5 mG/acetaminophen 325 mG 1 Tablet(s) Oral every 6 hours PRN Severe Pain (7 - 10)    PRESENT SYMPTOMS: [ ]Unable to obtain due to poor mentation   Source if other than patient:  [ x]Family/HHA   [ ]Team     Pain (Impact on QOL):    Location -       LE thigh  Minimal acceptable level (0-10 scale):                    Aggravating factors -  Quality -  Radiation -  Severity (0-10 scale) -    Timing -    PAIN AD Score:     [email or web address] (press ctrl +  left click to view)    Dyspnea:                           [ ]Mild [ ]Moderate [ ]Severe  Anxiety:                             [ ]Mild [ ]Moderate [ ]Severe  Fatigue:                             [ ]Mild [x ]Moderate [ ]Severe  Nausea:                             [ ]Mild [ ]Moderate [ ]Severe  Loss of appetite:              [ ]Mild [ ]Moderate [ ]Severe  Constipation:                    [ ]Mild [ ]Moderate [ ]Severe    Other Symptoms:  [x ]All other review of systems negative     Karnofsky Performance Score/Palliative Performance Status Version 2:     30    %    http://palliative.info/resource_material/[email or web address]  PHYSICAL EXAM:  Vital Signs Last 24 Hrs  T(C): 36.8 (12 Mar 2019 07:44), Max: 37.6 (11 Mar 2019 14:30)  T(F): 98.2 (12 Mar 2019 07:44), Max: 99.6 (11 Mar 2019 14:30)  HR: 92 (12 Mar 2019 07:44) (91 - 104)  BP: 106/73 (12 Mar 2019 07:44) (92/64 - 133/66)  BP(mean): --  RR: 18 (12 Mar 2019 07:44) (18 - 20)  SpO2: 98% (12 Mar 2019 07:44) (95% - 98%) I&O's Summary    11 Mar 2019 07:01  -  12 Mar 2019 07:00  --------------------------------------------------------  IN: 2160 mL / OUT: 251 mL / NET: 1909 mL    12 Mar 2019 07:01  -  12 Mar 2019 13:18  --------------------------------------------------------  IN: 400 mL / OUT: 0 mL / NET: 400 mL    GENERAL:  [ ]Alert  [ ]Oriented x 1   [ x]Lethargic  [ ]Cachexia  [ ]Unarousable  [ ]Verbal  [ ]Non-Verbal  Behavioral:   [ ] Anxiety  [ ] Delirium [ ] Agitation [ ] Other  HEENT:  [ ]Normal   x[ ]Dry mouth   [ ]ET Tube/Trach  [ ]Oral lesions  PULMONARY:   [x ]Clear [ ]Tachypnea  [ ]Audible excessive secretions   [ ]Rhonchi        [ ]Right [ ]Left [ ]Bilateral  [ ]Crackles        [ ]Right [ ]Left [ ]Bilateral  [ ]Wheezing     [ ]Right [ ]Left [ ]Bilateral  CARDIOVASCULAR:    [ x]Regular [ ]Irregular [ ]Tachy  [ ]Srikanth [ ]Murmur [ ]Other  GASTROINTESTINAL:  [x ]Soft  [ ]Distended   [ ]+BS  [ ]Non tender [ ]Tender  [ ]PEG [ ]OGT/ NGT  Last BM:   GENITOURINARY:  [ ]Normal [ ] Incontinent   [ ]Oliguria/Anuria   [ ]Navarrete  MUSCULOSKELETAL:   [ ]Normal   [ x]Weakness  [ ]Bed/Wheelchair bound [ ]Edema  NEUROLOGIC:   [ ]No focal deficits  [x ] Cognitive impairment  [ ] Dysphagia [ ]Dysarthria [ ] Paresis [ ]Other   SKIN:   [ ]Normal   [ ]Pressure ulcer(s)  [ ]Rash lesions/phyoderma    CRITICAL CARE:  [ ] Shock Present  [ ]Septic [ ]Cardiogenic [ ]Neurologic [ ]Hypovolemic  [ ]  Vasopressors [ ]  Inotropes   [ ] Respiratory failure present  [ ] Acute  [ ] Chronic [ ] Hypoxic  [ ] Hypercarbic [ ] Other  [ ] Other organ failure     LABS:                        7.2    25.0  )-----------( 252      ( 12 Mar 2019 07:35 )             22.7   03-12    145  |  108  |  42<H>  ----------------------------<  119<H>  4.4   |  25  |  2.07<H>    Ca    8.1<L>      12 Mar 2019 07:34          RADIOLOGY & ADDITIONAL STUDIES:    PROTEIN CALORIE MALNUTRITION PRESENT: [ ] Yes [ ] No  [ ] PPSV2 < or = to 30% [ ] significant weight loss  [ ] poor nutritional intake [ ] catabolic state [ ] anasarca     Albumin, Serum: 3.1 g/dL (19 @ 11:49)  Artificial Nutrition [ ]     REFERRALS:   [ ]Chaplaincy  [ ] Hospice  [ ]Child Life  [ ]Social Work  [ ]Case management [ ]Holistic Therapy   Goals of Care Discussion Document:

## 2019-03-14 NOTE — PROGRESS NOTE ADULT - SUBJECTIVE AND OBJECTIVE BOX
Patient seen and examined  no complaints    amoxicillin (Other)  IV Contrast (Flushing (Skin); Nausea)    Hospital Medications:   MEDICATIONS  (STANDING):  ascorbic acid 500 milliGRAM(s) Oral daily  aspirin enteric coated 81 milliGRAM(s) Oral daily  atorvastatin 10 milliGRAM(s) Oral at bedtime  bisacodyl 5 milliGRAM(s) Oral at bedtime  calcium carbonate 1250 mG  + Vitamin D (OsCal 500 + D) 1 Tablet(s) Oral three times a day  cholecalciferol 1000 Unit(s) Oral daily  cycloSPORINE  (SandIMMUNE)  Solution 75 milliGRAM(s) Oral two times a day  Dakins Solution - 1/4 Strength 1 Application(s) Topical two times a day  DAPTOmycin IVPB 700 milliGRAM(s) IV Intermittent every 24 hours  escitalopram 5 milliGRAM(s) Oral daily  folic acid 1 milliGRAM(s) Oral daily  heparin  Injectable 5000 Unit(s) SubCutaneous every 8 hours  meropenem  IVPB 1000 milliGRAM(s) IV Intermittent every 12 hours  metoprolol tartrate 12.5 milliGRAM(s) Oral two times a day  nystatin    Suspension 543170 Unit(s) Swish and Swallow three times a day  polyethylene glycol 3350 17 Gram(s) Oral daily  vitamin B complex with vitamin C 1 Tablet(s) Oral daily      VITALS:  T(F): 99.6 (19 @ 08:50), Max: 99.6 (19 @ 08:50)  HR: 108 (19 @ 07:54)  BP: 114/52 (19 @ 07:54)  RR: 22 (19 @ 07:54)  SpO2: 99% (19 @ 07:54)  Wt(kg): --     @ 07:01  -   @ 07:00  --------------------------------------------------------  IN: 450 mL / OUT: 150 mL / NET: 300 mL        Physical Exam :-  Constitutional: NAD  Neck: Supple.  Respiratory: Bilateral equal breath sounds,  Cardiovascular: S1, S2 normal,Gastrointestinal: Bowel Sounds present, soft, non tender.  Extremities: _++ edema  Neurological: Confused      LABS:      143  |  107  |  42<H>  ----------------------------<  86  4.5   |  24  |  1.96<H>    Ca    8.2<L>      13 Mar 2019 07:48      Creatinine Trend: 1.96 <--, 2.07 <--, 1.87 <--, 1.73 <--, 1.79 <--, 2.00 <--                        7.4    29.81 )-----------( 284      ( 13 Mar 2019 13:27 )             25.9     Urine Studies:  Urinalysis Basic - ( 13 Mar 2019 21:41 )    Color: Kristel / Appearance: Turbid / S.023 / pH:   Gluc:  / Ketone: Negative  / Bili: Negative / Urobili: 3 mg/dL   Blood:  / Protein: 30 mg/dL / Nitrite: Negative   Leuk Esterase: Large / RBC: 8 /HPF / WBC 22 /HPF   Sq Epi:  / Non Sq Epi: >27 /HPF / Bacteria: Moderate      Protein/Creatinine Ratio Calculation: 0.4 Ratio ( @ 21:27)  Creatinine, Random Urine: 142 mg/dL ( @ 21:27)  Potassium, Random Urine: 50 mmol/L ( @ 16:28)  Sodium, Random Urine: <20 mmol/L ( @ 16:28)  Chloride, Random Urine: <35 mmol/L ( @ 16:28)    RADIOLOGY & ADDITIONAL STUDIES:

## 2019-03-15 LAB
ANION GAP SERPL CALC-SCNC: 13 MMOL/L — SIGNIFICANT CHANGE UP (ref 5–17)
ANTIBODY ID 1_1: SIGNIFICANT CHANGE UP
ANTIBODY ID 1_2: SIGNIFICANT CHANGE UP
BLD GP AB SCN SERPL QL: POSITIVE — SIGNIFICANT CHANGE UP
BUN SERPL-MCNC: 49 MG/DL — HIGH (ref 7–23)
CALCIUM SERPL-MCNC: 7.8 MG/DL — LOW (ref 8.4–10.5)
CHLORIDE SERPL-SCNC: 108 MMOL/L — SIGNIFICANT CHANGE UP (ref 96–108)
CO2 SERPL-SCNC: 24 MMOL/L — SIGNIFICANT CHANGE UP (ref 22–31)
CREAT SERPL-MCNC: 2 MG/DL — HIGH (ref 0.5–1.3)
DAT POLY-SP REAG RBC QL: NEGATIVE — SIGNIFICANT CHANGE UP
GLUCOSE SERPL-MCNC: 84 MG/DL — SIGNIFICANT CHANGE UP (ref 70–99)
HCT VFR BLD CALC: 24.8 % — LOW (ref 34.5–45)
HGB BLD-MCNC: 7 G/DL — CRITICAL LOW (ref 11.5–15.5)
MCHC RBC-ENTMCNC: 26.5 PG — LOW (ref 27–34)
MCHC RBC-ENTMCNC: 28.2 GM/DL — LOW (ref 32–36)
MCV RBC AUTO: 93.9 FL — SIGNIFICANT CHANGE UP (ref 80–100)
PLATELET # BLD AUTO: 246 K/UL — SIGNIFICANT CHANGE UP (ref 150–400)
POTASSIUM SERPL-MCNC: 4.4 MMOL/L — SIGNIFICANT CHANGE UP (ref 3.5–5.3)
POTASSIUM SERPL-SCNC: 4.4 MMOL/L — SIGNIFICANT CHANGE UP (ref 3.5–5.3)
RBC # BLD: 2.64 M/UL — LOW (ref 3.8–5.2)
RBC # FLD: 20.6 % — HIGH (ref 10.3–14.5)
RH IG SCN BLD-IMP: POSITIVE — SIGNIFICANT CHANGE UP
SODIUM SERPL-SCNC: 145 MMOL/L — SIGNIFICANT CHANGE UP (ref 135–145)
WBC # BLD: 27.87 K/UL — HIGH (ref 3.8–10.5)
WBC # FLD AUTO: 27.87 K/UL — HIGH (ref 3.8–10.5)

## 2019-03-15 RX ORDER — SODIUM CHLORIDE 9 MG/ML
1000 INJECTION, SOLUTION INTRAVENOUS
Qty: 0 | Refills: 0 | Status: DISCONTINUED | OUTPATIENT
Start: 2019-03-15 | End: 2019-03-16

## 2019-03-15 RX ADMIN — HEPARIN SODIUM 5000 UNIT(S): 5000 INJECTION INTRAVENOUS; SUBCUTANEOUS at 13:48

## 2019-03-15 RX ADMIN — CYCLOSPORINE 75 MILLIGRAM(S): 100 CAPSULE ORAL at 13:54

## 2019-03-15 RX ADMIN — SODIUM CHLORIDE 60 MILLILITER(S): 9 INJECTION, SOLUTION INTRAVENOUS at 22:08

## 2019-03-15 RX ADMIN — MEROPENEM 100 MILLIGRAM(S): 1 INJECTION INTRAVENOUS at 07:04

## 2019-03-15 RX ADMIN — Medication 81 MILLIGRAM(S): at 13:48

## 2019-03-15 RX ADMIN — MEROPENEM 100 MILLIGRAM(S): 1 INJECTION INTRAVENOUS at 22:11

## 2019-03-15 RX ADMIN — HEPARIN SODIUM 5000 UNIT(S): 5000 INJECTION INTRAVENOUS; SUBCUTANEOUS at 22:19

## 2019-03-15 RX ADMIN — Medication 1 TABLET(S): at 13:48

## 2019-03-15 RX ADMIN — Medication 1 MILLIGRAM(S): at 13:48

## 2019-03-15 RX ADMIN — ESCITALOPRAM OXALATE 5 MILLIGRAM(S): 10 TABLET, FILM COATED ORAL at 13:48

## 2019-03-15 RX ADMIN — DAPTOMYCIN 128 MILLIGRAM(S): 500 INJECTION, POWDER, LYOPHILIZED, FOR SOLUTION INTRAVENOUS at 22:08

## 2019-03-15 RX ADMIN — CYCLOSPORINE 75 MILLIGRAM(S): 100 CAPSULE ORAL at 22:13

## 2019-03-15 RX ADMIN — Medication 1 APPLICATION(S): at 07:03

## 2019-03-15 RX ADMIN — OXYCODONE AND ACETAMINOPHEN 1 TABLET(S): 5; 325 TABLET ORAL at 13:48

## 2019-03-15 RX ADMIN — Medication 1 APPLICATION(S): at 15:23

## 2019-03-15 RX ADMIN — Medication 500000 UNIT(S): at 13:48

## 2019-03-15 RX ADMIN — Medication 1000 UNIT(S): at 13:48

## 2019-03-15 RX ADMIN — OXYCODONE AND ACETAMINOPHEN 1 TABLET(S): 5; 325 TABLET ORAL at 07:58

## 2019-03-15 RX ADMIN — HEPARIN SODIUM 5000 UNIT(S): 5000 INJECTION INTRAVENOUS; SUBCUTANEOUS at 07:05

## 2019-03-15 RX ADMIN — Medication 500 MILLIGRAM(S): at 13:48

## 2019-03-15 NOTE — PROVIDER CONTACT NOTE (MEDICATION) - ASSESSMENT
pt is lethargic, has been baseline for quite a few days now and has been refusing to eat or take anything by mouth. pt likely actively dying, very depressed, expresses feelings of wanting to die.

## 2019-03-15 NOTE — PROGRESS NOTE ADULT - SUBJECTIVE AND OBJECTIVE BOX
Patient is a 78y old  Female who presents with a chief complaint of Cellulitis (15 Mar 2019 11:59)      SUBJECTIVE / OVERNIGHT EVENTS:  more awake.  ate a little bit this am.  denied pain.  the aide at bedside.       Vital Signs Last 24 Hrs  T(C): 37 (15 Mar 2019 16:58), Max: 37.3 (14 Mar 2019 21:45)  T(F): 98.6 (15 Mar 2019 16:58), Max: 99.1 (14 Mar 2019 21:45)  HR: 94 (15 Mar 2019 16:58) (94 - 104)  BP: 104/70 (15 Mar 2019 16:58) (95/62 - 105/67)  BP(mean): --  RR: 18 (15 Mar 2019 16:58) (18 - 22)  SpO2: 93% (15 Mar 2019 16:58) (93% - 99%)  I&O's Summary    14 Mar 2019 07:01  -  15 Mar 2019 07:00  --------------------------------------------------------  IN: 0 mL / OUT: 300 mL / NET: -300 mL    15 Mar 2019 07:01  -  15 Mar 2019 17:29  --------------------------------------------------------  IN: 200 mL / OUT: 0 mL / NET: 200 mL      PHYSICAL EXAM:  GENERAL: comfortable, lying in bed, awake alert, morbidly obese  HEAD:  Atraumatic, Normocephalic  EYES: EOMI, PERRLA, conjunctiva and sclera clear  NECK: Supple, No JVD  CHEST/LUNG: mild decrease breath sounds bilaterally; No wheeze   HEART: Regular rate and rhythm; No murmurs, rubs, or gallops  ABDOMEN: Soft, Nontender, Nondistended; Bowel sounds present  Neuro: lethargy prevents utility of this exam  EXTREMITIES:  2+ Peripheral Pulses, No clubbing, cyanosis.  2+ nonpitting edema LE, UE trace edema (much improved)  Back: sacral and thigh wound. +wound vac in place  SKIN: No rashes or lesions      LABS:                        7.0    27.87 )-----------( 246      ( 15 Mar 2019 10:09 )             24.8     03-15    145  |  108  |  49<H>  ----------------------------<  84  4.4   |  24  |  2.00<H>    Ca    7.8<L>      15 Mar 2019 07:37        CAPILLARY BLOOD GLUCOSE            Urinalysis Basic - ( 13 Mar 2019 21:41 )    Color: Kristel / Appearance: Turbid / S.023 / pH: x  Gluc: x / Ketone: Negative  / Bili: Negative / Urobili: 3 mg/dL   Blood: x / Protein: 30 mg/dL / Nitrite: Negative   Leuk Esterase: Large / RBC: 8 /HPF / WBC 22 /HPF   Sq Epi: x / Non Sq Epi: >27 /HPF / Bacteria: Moderate        RADIOLOGY & ADDITIONAL TESTS:    Imaging Personally Reviewed:  [x] YES  [ ] NO    Consultant(s) Notes Reviewed:  [x] YES  [ ] NO      MEDICATIONS  (STANDING):  ascorbic acid 500 milliGRAM(s) Oral daily  aspirin enteric coated 81 milliGRAM(s) Oral daily  atorvastatin 10 milliGRAM(s) Oral at bedtime  bisacodyl 5 milliGRAM(s) Oral at bedtime  calcium carbonate 1250 mG  + Vitamin D (OsCal 500 + D) 1 Tablet(s) Oral three times a day  cholecalciferol 1000 Unit(s) Oral daily  cycloSPORINE  (SandIMMUNE)  Solution 75 milliGRAM(s) Oral two times a day  Dakins Solution - 1/4 Strength 1 Application(s) Topical two times a day  DAPTOmycin IVPB 700 milliGRAM(s) IV Intermittent every 24 hours  dextrose 5%. 1000 milliLiter(s) (60 mL/Hr) IV Continuous <Continuous>  escitalopram 5 milliGRAM(s) Oral daily  folic acid 1 milliGRAM(s) Oral daily  heparin  Injectable 5000 Unit(s) SubCutaneous every 8 hours  meropenem  IVPB 1000 milliGRAM(s) IV Intermittent every 12 hours  metoprolol tartrate 12.5 milliGRAM(s) Oral two times a day  nystatin    Suspension 900513 Unit(s) Swish and Swallow three times a day  polyethylene glycol 3350 17 Gram(s) Oral daily  vitamin B complex with vitamin C 1 Tablet(s) Oral daily    MEDICATIONS  (PRN):  acetaminophen   Tablet .. 650 milliGRAM(s) Oral every 6 hours PRN Mild Pain (1 - 3)  acetaminophen   Tablet .. 650 milliGRAM(s) Oral every 6 hours PRN Temp greater or equal to 38C (100.4F)  acetaminophen  IVPB .. 1000 milliGRAM(s) IV Intermittent once PRN Moderate Pain (4 - 6)  melatonin 3 milliGRAM(s) Oral at bedtime PRN Insomnia  oxyCODONE    5 mG/acetaminophen 325 mG 1 Tablet(s) Oral every 6 hours PRN Severe Pain (7 - 10)      Care Discussed with Consultants/Other Providers [x] YES  [ ] NO    HEALTH ISSUES - PROBLEM Dx:  Need for prophylactic measure: Need for prophylactic measure  Hypernatremia: Hypernatremia  Edema: Edema  Bacteremia: Bacteremia  Iron deficiency anemia: Iron deficiency anemia  Hyperkalemia: Hyperkalemia  Acute kidney injury superimposed on CKD: Acute kidney injury superimposed on CKD  Acute kidney failure: Acute kidney failure  Pyoderma gangrenosum: Pyoderma gangrenosum  Depression, unspecified depression type: Depression, unspecified depression type  Medication management: Medication management  Gastroesophageal reflux disease, esophagitis presence not specified: Gastroesophageal reflux disease, esophagitis presence not specified  Aortic stenosis, severe: Aortic stenosis, severe  Coronary artery disease involving native coronary artery of native heart without angina pectoris: Coronary artery disease involving native coronary artery of native heart without angina pectoris  Altered mental status, unspecified altered mental status type: Altered mental status, unspecified altered mental status type  Cellulitis of leg, right: Cellulitis of leg, right

## 2019-03-15 NOTE — PROGRESS NOTE ADULT - ASSESSMENT
· Assessment		  78 F PMH of CAD s/p stent to LAD, AS s/p TAVR, PPM, DVT (Jan '18) on coumadin, IVC filter, pyoderma gangrenosum, hx MRSA infections, and total knee replacement c/b joint infections s/p I&D explantation and spacer placement followed by spacer removal and static spacer placement with joint fusion due to infection of initial spacer with complex wound closure by plastic surgery, admission here in November 2018 for hip and thigh wounds, now presenting with confusion and chills at home associated with poor appetite and confusion x 1 week and not eating x 2 days. Noted with fever here, YONATAN on labwork. R knee evaluated by orthopedic surgery.	    Problem/Plan - 1:  ·  Problem: Bacteremia.  Plan: 2' S epidermidis  Daptomycin last day is 3/28/19; PICC placed 3/6/19  blood cultures from 2/28, 3/9 are negative.  ID f/u appreciated  TTE without discrete vegetation  Too high-risk for ERIKA  CT chest 2/11 revealing b/l pleural effusions  Poor prognosis overall. Palliative on the case.  Day 29/42 abx today.  If  refused hospice, then likely dc planning home with home care.       Problem/Plan - 2:  ·  Problem: Iron deficiency anemia.  Plan: in setting of CK3  s/p venofer 200 mg on 3/7 and 3/8  s/p 1 unit of pRBC on 3/9 and likely another one today 3/15  daily CBC.     Problem/Plan - 3:  ·  Problem: Pyoderma gangrenosum.  Plan: cyclosporine temporarily held on admission, but later restarted.   Cyclosporine dose adjusted to 100 mg BID based on levels  completed chronic steroids taper for pyoderma.  (she was on Prednisone 5 mg qdaily, tapered off)  Per orthopedic surgery can be OOB primarily to wheelchair and can 50% weight  wound care consult and wound vac management appreciated.     Problem/Plan - 4:  ·  Problem: Altered mental status, unspecified altered mental status type.  Plan: likely 2/2 metabolic encephalopathy given fever and concern for cellulitis  mental status stable  Patient also on narcotics but stable on current regimen.   c/w oxycodone to q6hr PRN.  Per the son, she gets q4hr sometime at home if pain is severe.   she is on home gabapentin 300 mg BID, neurontin tapered down to 100 mg bid and she is completely off for now due to lethargy.     Problem/Plan - 5:  ·  Problem: Acute kidney failure.  Plan: likely cardiorenal. intravascular depleted but grossly volume overloaded.   poor PO intake.   renal appreciated  Holding off diuretics for now  Repeat urine labs and bladder scan ordered 3/12/19    Problem/Plan - 6:  Problem: Coronary artery disease involving native coronary artery of native heart without angina pectoris. Plan: Cont Atovastatin 10 mg QHS.    Problem/Plan - 7:  ·  Problem: Aortic stenosis, severe.  Plan: Pt with severe AS, need to be cautious with fluids.     Problem/Plan - 8:  ·  Problem: Gastroesophageal reflux disease, esophagitis presence not specified.  Plan: Protonix.     Problem/Plan - 9:  ·  Problem: Depression, unspecified depression type.    c/w low dose Lexapro 5 mg.     Problem/Plan - 10:  Problem: Need for prophylactic measure. Plan; SC heparin 5000 units q8h.    Problem/Plan - 11:  ·  Problem: poor prognosis.  Appreciate palliative assistance as family members do not seem to grasp complexity of pt's condition.  Given history,  will likely take her home. Numerous conversations held in the past.

## 2019-03-15 NOTE — PROGRESS NOTE ADULT - SUBJECTIVE AND OBJECTIVE BOX
[facility] - KIRTI Castillo / KIRTI Gracia / RITA Daniels/ KIRTI Zamudio/ KIRTI Irizarry/ LOLLY Hays / CLINT Forbes / LEBRON Boss  ---------------------------------------------------------------------------------------------------------------  seen and examined today for YONATAN  Interval : Stable renal function, poor prognosis due to nil Po intake  VITALS:  T(F): 98.2 (03-15-19 @ 08:11), Max: 99.1 (03-14-19 @ 21:45)  HR: 104 (03-15-19 @ 08:11)  BP: 105/67 (03-15-19 @ 08:11)  RR: 18 (03-15-19 @ 08:11)  SpO2: 99% (03-15-19 @ 08:11)  Wt(kg): --    03-14 @ 07:01  -  03-15 @ 07:00  --------------------------------------------------------  IN: 0 mL / OUT: 300 mL / NET: -300 mL    03-15 @ 07:01  -  03-15 @ 10:48  --------------------------------------------------------  IN: 100 mL / OUT: 0 mL / NET: 100 mL      Physical Exam :-  Constitutional: NAD  Neck: Supple.  Respiratory: Bilateral equal breath sounds,  Cardiovascular: S1, S2 normal,  Gastrointestinal: Bowel Sounds present, soft, non tender.  Extremities: Anasarca 1+  Neurological: AAOx1, confused  Psychiatric: Normal mood, normal affect  Data:-  Allergies :   amoxicillin (Other)  IV Contrast (Flushing (Skin); Nausea)    Hospital Medications:   MEDICATIONS  (STANDING):  ascorbic acid 500 milliGRAM(s) Oral daily  aspirin enteric coated 81 milliGRAM(s) Oral daily  atorvastatin 10 milliGRAM(s) Oral at bedtime  bisacodyl 5 milliGRAM(s) Oral at bedtime  calcium carbonate 1250 mG  + Vitamin D (OsCal 500 + D) 1 Tablet(s) Oral three times a day  cholecalciferol 1000 Unit(s) Oral daily  cycloSPORINE  (SandIMMUNE)  Solution 75 milliGRAM(s) Oral two times a day  Dakins Solution - 1/4 Strength 1 Application(s) Topical two times a day  DAPTOmycin IVPB 700 milliGRAM(s) IV Intermittent every 24 hours  dextrose 5% + sodium chloride 0.45%. 1000 milliLiter(s) (60 mL/Hr) IV Continuous <Continuous>  escitalopram 5 milliGRAM(s) Oral daily  folic acid 1 milliGRAM(s) Oral daily  heparin  Injectable 5000 Unit(s) SubCutaneous every 8 hours  meropenem  IVPB 1000 milliGRAM(s) IV Intermittent every 12 hours  metoprolol tartrate 12.5 milliGRAM(s) Oral two times a day  nystatin    Suspension 888849 Unit(s) Swish and Swallow three times a day  polyethylene glycol 3350 17 Gram(s) Oral daily  vitamin B complex with vitamin C 1 Tablet(s) Oral daily    03-15    145  |  108  |  49<H>  ----------------------------<  84  4.4   |  24  |  2.00<H>    Ca    7.8<L>      15 Mar 2019 07:37      Creatinine Trend: 2.00 <--, 2.11 <--, 1.96 <--, 2.07 <--, 1.87 <--, 1.73 <--, 1.79 <--                        7.7    26.7  )-----------( 225      ( 14 Mar 2019 15:39 )             24.8

## 2019-03-15 NOTE — CHART NOTE - NSCHARTNOTEFT_GEN_A_CORE
I received two calls today from the  and patient's daughter.  I discussed the patient's status with both I received two calls today from the  and patient's daughter.  I discussed the patient's status with both  I also discussed status with ID MD today by phone

## 2019-03-15 NOTE — CHART NOTE - NSCHARTNOTEFT_GEN_A_CORE
The surrogate, her , agreed to meet on Tuesday at 3pm  "I am tired doctor, I haven't slpet.  My children will be there too"  The agenda will be as follows:  general medical updates  discussion about advance directive  Will co-facilitate with the primary team

## 2019-03-15 NOTE — PROGRESS NOTE ADULT - SUBJECTIVE AND OBJECTIVE BOX
CC: f/u for  cns bacteremia relapse and sepsis unclear etiology  Patient reports nothing intelligible    REVIEW OF SYSTEMS:  All other review of systems negative (Comprehensive ROS)    Antimicrobials Day #  :  DAPTOmycin IVPB 700 milliGRAM(s) IV Intermittent every 24 hours  day   meropenem  IVPB 1000 milliGRAM(s) IV Intermittent every 12 hours  day 4  nystatin    Suspension 989150 Unit(s) Swish and Swallow three times a day    Other Medications Reviewed    T(F): 98.2 (03-15-19 @ 08:11), Max: 99.1 (19 @ 21:45)  HR: 104 (03-15-19 @ 08:11)  BP: 105/67 (03-15-19 @ 08:11)  RR: 18 (03-15-19 @ 08:11)  SpO2: 99% (03-15-19 @ 08:11)  Wt(kg): --    PHYSICAL EXAM:  General: somnolent no acute distress  Eyes:  anicteric, no conjunctival injection, no discharge  Oropharynx: no lesions or injection 	  Neck: supple, without adenopathy  Lungs: course to auscultation  Heart: regular rate and rhythm; no murmur, rubs or gallops  Abdomen: soft, nondistended, nontender, without mass or organomegaly  Skin: no lesions  Extremities: wounds over both hips  Neurologic: sleepy, not speaking just yells no    LAB RESULTS:                        7.0    27.87 )-----------( 246      ( 15 Mar 2019 10:09 )             24.8     03-15    145  |  108  |  49<H>  ----------------------------<  84  4.4   |  24  |  2.00<H>    Ca    7.8<L>      15 Mar 2019 07:37        Urinalysis Basic - ( 13 Mar 2019 21:41 )    Color: Kristel / Appearance: Turbid / S.023 / pH: x  Gluc: x / Ketone: Negative  / Bili: Negative / Urobili: 3 mg/dL   Blood: x / Protein: 30 mg/dL / Nitrite: Negative   Leuk Esterase: Large / RBC: 8 /HPF / WBC 22 /HPF   Sq Epi: x / Non Sq Epi: >27 /HPF / Bacteria: Moderate      MICROBIOLOGY:  RECENT CULTURES:  Culture - Blood (19 @ 16:44)    Specimen Source: .Blood Blood-Venous    Culture Results:   No growth at 5 days.    Culture - Blood (19 @ 16:44)    Specimen Source: .Blood Blood-Venous    Culture Results:   No growth at 5 days.        RADIOLOGY REVIEWED:  < from: CT Chest No Cont (03.10.19 @ 20:01) >    IMPRESSION:     1.  There are bilateral pleural effusions.  2.  Passive atelectasis from bilateral pleural effusions.       end of copied text >    Assessment:  Patient with pyoderma gangrenosa, mrsa right tkr infection with spacer in place, relapsed cns bacteremia with suspected pve of tavr/ppm, failing in general with presumed ongoing uncontrolled infection despite negative blood cultures and now broad spectrum antibiotics. No new source is apparent, patient is not a candidate for invasive procedures. Antibiotics are adjusted for cr cl of 42  Plan:  will limit meropenem to one more day  continue dapto  local care to wounds, cyclosporin per derm but she seems to be breaking through  agree with working on setting realistic humane goals of care with family given current condition and course to date with very poor prognosis overall

## 2019-03-15 NOTE — PROGRESS NOTE ADULT - NSICDXPROBLEM_GEN_ALL_CORE_FT
PROBLEM DIAGNOSES  Problem: Acute kidney failure  Assessment and Plan: Worsening Edema noted  please change IVF to D5W @ 60cc/hr  Encourage PO intake  Poor prognosis

## 2019-03-16 DIAGNOSIS — F05 DELIRIUM DUE TO KNOWN PHYSIOLOGICAL CONDITION: ICD-10-CM

## 2019-03-16 LAB
HCT VFR BLD CALC: 29.9 % — LOW (ref 34.5–45)
HGB BLD-MCNC: 8.5 G/DL — LOW (ref 11.5–15.5)
MCHC RBC-ENTMCNC: 26.2 PG — LOW (ref 27–34)
MCHC RBC-ENTMCNC: 28.4 GM/DL — LOW (ref 32–36)
MCV RBC AUTO: 92.3 FL — SIGNIFICANT CHANGE UP (ref 80–100)
NRBC # BLD: 1 /100 WBCS — HIGH (ref 0–0)
PLATELET # BLD AUTO: 268 K/UL — SIGNIFICANT CHANGE UP (ref 150–400)
RBC # BLD: 3.24 M/UL — LOW (ref 3.8–5.2)
RBC # FLD: 20.3 % — HIGH (ref 10.3–14.5)
WBC # BLD: 29.76 K/UL — HIGH (ref 3.8–10.5)
WBC # FLD AUTO: 29.76 K/UL — HIGH (ref 3.8–10.5)

## 2019-03-16 PROCEDURE — 99232 SBSQ HOSP IP/OBS MODERATE 35: CPT

## 2019-03-16 RX ORDER — MEROPENEM 1 G/30ML
500 INJECTION INTRAVENOUS EVERY 12 HOURS
Qty: 0 | Refills: 0 | Status: DISCONTINUED | OUTPATIENT
Start: 2019-03-16 | End: 2019-03-16

## 2019-03-16 RX ORDER — OXYCODONE AND ACETAMINOPHEN 5; 325 MG/1; MG/1
1 TABLET ORAL EVERY 6 HOURS
Qty: 0 | Refills: 0 | Status: DISCONTINUED | OUTPATIENT
Start: 2019-03-16 | End: 2019-03-23

## 2019-03-16 RX ORDER — DAPTOMYCIN 500 MG/10ML
700 INJECTION, POWDER, LYOPHILIZED, FOR SOLUTION INTRAVENOUS
Qty: 0 | Refills: 0 | Status: DISCONTINUED | OUTPATIENT
Start: 2019-03-18 | End: 2019-03-25

## 2019-03-16 RX ADMIN — Medication 1 APPLICATION(S): at 05:37

## 2019-03-16 RX ADMIN — OXYCODONE AND ACETAMINOPHEN 1 TABLET(S): 5; 325 TABLET ORAL at 16:48

## 2019-03-16 RX ADMIN — OXYCODONE AND ACETAMINOPHEN 1 TABLET(S): 5; 325 TABLET ORAL at 05:38

## 2019-03-16 RX ADMIN — HEPARIN SODIUM 5000 UNIT(S): 5000 INJECTION INTRAVENOUS; SUBCUTANEOUS at 15:06

## 2019-03-16 RX ADMIN — HEPARIN SODIUM 5000 UNIT(S): 5000 INJECTION INTRAVENOUS; SUBCUTANEOUS at 05:39

## 2019-03-16 RX ADMIN — MEROPENEM 100 MILLIGRAM(S): 1 INJECTION INTRAVENOUS at 05:39

## 2019-03-16 RX ADMIN — OXYCODONE AND ACETAMINOPHEN 1 TABLET(S): 5; 325 TABLET ORAL at 06:00

## 2019-03-16 RX ADMIN — HEPARIN SODIUM 5000 UNIT(S): 5000 INJECTION INTRAVENOUS; SUBCUTANEOUS at 22:30

## 2019-03-16 RX ADMIN — Medication 1 APPLICATION(S): at 22:17

## 2019-03-16 NOTE — PROGRESS NOTE BEHAVIORAL HEALTH - NSBHCONSULTFOLLOWAFTERCARE_PSY_A_CORE FT
per primary team  [phone] team  [phone] primary team  [phone]

## 2019-03-16 NOTE — PROGRESS NOTE ADULT - ASSESSMENT
· Assessment		  78 F PMH of CAD s/p stent to LAD, AS s/p TAVR, PPM, DVT (Jan '18) on coumadin, IVC filter, pyoderma gangrenosum, hx MRSA infections, and total knee replacement c/b joint infections s/p I&D explantation and spacer placement followed by spacer removal and static spacer placement with joint fusion due to infection of initial spacer with complex wound closure by plastic surgery, admission here in November 2018 for hip and thigh wounds, now presenting with confusion and chills at home associated with poor appetite and confusion x 1 week and not eating x 2 days. Noted with fever here, YONATAN on labwork. R knee evaluated by orthopedic surgery.	    Problem/Plan - 1:  ·  Problem: Bacteremia.  Plan: 2' S epidermidis  Daptomycin last day is 3/28/19; PICC placed 3/6/19  blood cultures from 2/28, 3/9 are negative.  ID f/u appreciated  TTE without discrete vegetation  Too high-risk for ERIKA  CT chest 2/11 revealing b/l pleural effusions  Poor prognosis overall. Palliative on the case.  Day 30/42 abx today.  If  refused hospice, then likely dc planning home with home care.   met with the son and grandson Rodrigo today, poor prognosis explained.       Problem/Plan - 2:  ·  Problem: Iron deficiency anemia.  Plan: in setting of CK3  s/p venofer 200 mg on 3/7 and 3/8  s/p 1 unit of pRBC on 3/9 and likely another one on 3/15  daily CBC.     Problem/Plan - 3:  ·  Problem: Pyoderma gangrenosum.  Plan: cyclosporine temporarily held on admission, but later restarted.   Cyclosporine dose adjusted to 100 mg BID based on levels  completed chronic steroids taper for pyoderma.  (she was on Prednisone 5 mg qdaily, tapered off)  Per orthopedic surgery can be OOB primarily to wheelchair and can 50% weight  wound care consult and wound vac management appreciated.     Problem/Plan - 4:  ·  Problem: Altered mental status, unspecified altered mental status type.  Plan: likely 2/2 metabolic encephalopathy given fever and concern for cellulitis  mental status stable  Patient also on narcotics but stable on current regimen.   c/w oxycodone to q6hr PRN.  Per the son, she gets q4hr sometime at home if pain is severe.   she is on home gabapentin 300 mg BID, neurontin tapered down to 100 mg bid and she is completely off for now due to lethargy.     Problem/Plan - 5:  ·  Problem: Acute kidney failure.  Plan: likely cardiorenal. intravascular depleted but grossly volume overloaded.   poor PO intake.   renal appreciated  Holding off diuretics for now  Repeat urine labs and bladder scan ordered 3/12/19    Problem/Plan - 6:  Problem: Coronary artery disease involving native coronary artery of native heart without angina pectoris. Plan: Cont Atovastatin 10 mg QHS.    Problem/Plan - 7:  ·  Problem: Aortic stenosis, severe.  Plan: Pt with severe AS, need to be cautious with fluids.     Problem/Plan - 8:  ·  Problem: Gastroesophageal reflux disease, esophagitis presence not specified.  Plan: Protonix.     Problem/Plan - 9:  ·  Problem: Depression, unspecified depression type.    c/w low dose Lexapro 5 mg.     Problem/Plan - 10:  Problem: Need for prophylactic measure. Plan; SC heparin 5000 units q8h.    Problem/Plan - 11:  ·  Problem: poor prognosis.  Appreciate palliative assistance as family members do not seem to grasp complexity of pt's condition.  Given history,  will likely take her home. Numerous conversations held in the past.

## 2019-03-16 NOTE — PROGRESS NOTE BEHAVIORAL HEALTH - SUMMARY
77 y/o   female, domiciled with  and HHA, with no significant PPHx, no substance abuse hx, no prior psychiatric admissions, no prior SA's, with PMHx of HTN and pyoderma w/ gluteal wounds b/l, h/o DVT/PE, total knee replacement c/b joint infections p/w confusion, presenting with confusion and chills at home associated with poor appetite and confusion x 1 week and not eating x 2 days, found to have cellulitis of right leg, admitted to hospital for IV Abx.  Psychiatry consulted to assess for anxiety and suicidality.  Patient seen and evaluated, awake and alert, uncooperative with answering select questions, refuses answering orientation questions, telling writer to leave.  Does endorse feeling more depressed and anxious over her current pain symptoms and recent passing of a close family friend.  Denies SI, plan or intent during evaluation, denies h/o feeling suicidal.  Per , patient stopped eating and taking medications on Sunday, feels as if patient has "given up", does not believe patient is a safety risk, does not believe patient would intentionally harm herself.  Feels patient would benefit from antidepressant therapy.
79 y/o   female, domiciled with  and HHA, with no significant PPHx, no substance abuse hx, no prior psychiatric admissions, no prior SA's, with PMHx of HTN and pyoderma w/ gluteal wounds b/l, h/o DVT/PE, total knee replacement c/b joint infections p/w confusion, presenting with confusion and chills at home associated with poor appetite and confusion x 1 week and not eating x 2 days, found to have cellulitis of right leg, admitted to hospital for IV Abx.  Psychiatry consulted to assess for anxiety and suicidality.  Patient seen and evaluated, awake and alert, uncooperative with answering select questions, refuses answering orientation questions, telling writer to leave.  Does endorse feeling more depressed and anxious over her current pain symptoms and recent passing of a close family friend.  Denies SI, plan or intent during evaluation, denies h/o feeling suicidal.  Per , patient stopped eating and taking medications on Sunday, feels as if patient has "given up", does not believe patient is a safety risk, does not believe patient would intentionally harm herself.  Feels patient would benefit from antidepressant therapy.    Pt. was lethargic and responded briefly to verbal stimuli.
79 y/o   female, domiciled with  and HHA, with no significant PPHx, no substance abuse hx, no prior psychiatric admissions, no prior SA's, with PMHx of HTN and pyoderma w/ gluteal wounds b/l, h/o DVT/PE, total knee replacement c/b joint infections p/w confusion, presenting with confusion and chills at home associated with poor appetite and confusion x 1 week and not eating x 2 days, found to have cellulitis of right leg, admitted to hospital for IV Abx.  Psychiatry consulted to assess for anxiety and suicidality.  Patient seen and evaluated, lethargic, with clouding of sensorium.

## 2019-03-16 NOTE — PROGRESS NOTE ADULT - SUBJECTIVE AND OBJECTIVE BOX
CC: f/u for cns bacteremia    Patient reports she is in horrible pain    REVIEW OF SYSTEMS:  All other review of systems negative (Comprehensive ROS)    Antimicrobials Day #  :  nystatin    Suspension 418494 Unit(s) Swish and Swallow three times a day  dapto now q 48  Other Medications Reviewed    T(F): 98.3 (03-16-19 @ 07:59), Max: 99 (03-15-19 @ 22:01)  HR: 92 (03-16-19 @ 07:59)  BP: 82/60 (03-16-19 @ 07:59)  RR: 24 (03-16-19 @ 10:46)  SpO2: 98% (03-16-19 @ 10:46)  Wt(kg): --    PHYSICAL EXAM:  General: lethargic in  acute distress with dressing change  Eyes:  anicteric, no conjunctival injection, no discharge  Oropharynx: no lesions or injection 	  Neck: supple, without adenopathy  Lungs: course  to auscultation  Heart: regular rate and rhythm; no murmur, rubs or gallops  Abdomen: soft, nondistended, nontender, without mass or organomegaly  Skin: no lesions  Extremities: full thickness skin necrosis over both hips, thighs, buttocks  Neurologic: very lethargic    LAB RESULTS:                        8.5    29.76 )-----------( 268      ( 16 Mar 2019 12:17 )             29.9     03-15    145  |  108  |  49<H>  ----------------------------<  84  4.4   |  24  |  2.00<H>    Ca    7.8<L>      15 Mar 2019 07:37          MICROBIOLOGY:  RECENT CULTURES:    Culture - Blood (03.09.19 @ 16:44)    Specimen Source: .Blood Blood-Venous    Culture Results:   No growth at 5 days.      RADIOLOGY REVIEWED:    < from: CT Chest No Cont (03.10.19 @ 20:01) >    IMPRESSION:     1.  There are bilateral pleural effusions.  2.  Passive atelectasis from bilateral pleural effusions.        < end of copied text >  Assessment:  Patient with chronic right knee spacer infection, relapse of cns bacteremia with probably infected tavr and/ or ppm, pyoderma gangrenosa with extensive necrotic tissue over her hips, buttocks and thighs likely combination of pyoderma and pressure injury. At this point she cannot have any surgical intervention, nothing will change to limit the pressure necrosis, and no infection that is not being addressed with antibiotics to date is apparent. At this point we are approaching or even at medical futility for antimicrobics and she needs comfort as the only realistic and humane goal    Plan:  stop meropenem  decrease dapto to q 48h  would transfer to palliative service and give a narcotic drip for pain control

## 2019-03-16 NOTE — PROGRESS NOTE BEHAVIORAL HEALTH - NSBHCHARTREVIEWVS_PSY_A_CORE FT
Vital Signs Last 24 Hrs  T(C): 36.8 (16 Mar 2019 07:59), Max: 37.2 (15 Mar 2019 22:01)  T(F): 98.3 (16 Mar 2019 07:59), Max: 99 (15 Mar 2019 22:01)  HR: 92 (16 Mar 2019 07:59) (92 - 109)  BP: 82/60 (16 Mar 2019 07:59) (82/60 - 122/76)  BP(mean): --  RR: 24 (16 Mar 2019 10:46) (16 - 24)  SpO2: 98% (16 Mar 2019 10:46) (93% - 99%)  T(C): 36.8 (03-16-19 @ 07:59), Max: 37.2 (03-15-19 @ 22:01)  HR: 92 (03-16-19 @ 07:59) (92 - 109)  BP: 82/60 (03-16-19 @ 07:59) (82/60 - 122/76)  RR: 24 (03-16-19 @ 10:46) (16 - 24)  SpO2: 98% (03-16-19 @ 10:46) (93% - 99%)  Wt(kg): --
Vital Signs Last 24 Hrs  T(C): 37.1 (14 Mar 2019 15:13), Max: 37.6 (14 Mar 2019 08:50)  T(F): 98.7 (14 Mar 2019 15:13), Max: 99.6 (14 Mar 2019 08:50)  HR: 96 (14 Mar 2019 15:13) (84 - 108)  BP: 100/54 (14 Mar 2019 15:28) (89/55 - 137/68)  BP(mean): --  RR: 22 (14 Mar 2019 15:13) (18 - 22)  SpO2: 94% (14 Mar 2019 15:13) (94% - 99%)
Vital Signs Last 24 Hrs  T(C): 37.7 (15 Feb 2019 05:44), Max: 37.7 (15 Feb 2019 05:44)  T(F): 99.8 (15 Feb 2019 05:44), Max: 99.8 (15 Feb 2019 05:44)  HR: 94 (15 Feb 2019 05:44) (90 - 94)  BP: 158/72 (15 Feb 2019 05:44) (123/77 - 158/72)  BP(mean): --  RR: 20 (15 Feb 2019 05:44) (20 - 20)  SpO2: 92% (15 Feb 2019 05:44) (92% - 96%)

## 2019-03-16 NOTE — PROGRESS NOTE BEHAVIORAL HEALTH - PRIMARY DX
Depression, unspecified depression type
Depression, unspecified depression type
Delirium due to another medical condition

## 2019-03-16 NOTE — PROGRESS NOTE ADULT - SUBJECTIVE AND OBJECTIVE BOX
[facility] : Progress Note :: [phone], (office [phone]),   Dr Forbes / Dr Hays / Dr Irizarry / Dr Gracia / Dr Robb RAMIREZ / Dr Daniels / Dr Castillo / Dr Arik westbrook  _____________________________________________________________________________________________   remains with very poor oral intake.    amoxicillin (Other)  IV Contrast (Flushing (Skin); Nausea)    Hospital Medications:   MEDICATIONS  (STANDING):  ascorbic acid 500 milliGRAM(s) Oral daily  aspirin enteric coated 81 milliGRAM(s) Oral daily  atorvastatin 10 milliGRAM(s) Oral at bedtime  bisacodyl 5 milliGRAM(s) Oral at bedtime  calcium carbonate 1250 mG  + Vitamin D (OsCal 500 + D) 1 Tablet(s) Oral three times a day  cholecalciferol 1000 Unit(s) Oral daily  cycloSPORINE  (SandIMMUNE)  Solution 75 milliGRAM(s) Oral two times a day  Dakins Solution - 1/4 Strength 1 Application(s) Topical two times a day  escitalopram 5 milliGRAM(s) Oral daily  folic acid 1 milliGRAM(s) Oral daily  heparin  Injectable 5000 Unit(s) SubCutaneous every 8 hours  metoprolol tartrate 12.5 milliGRAM(s) Oral two times a day  nystatin    Suspension 462268 Unit(s) Swish and Swallow three times a day  polyethylene glycol 3350 17 Gram(s) Oral daily  vitamin B complex with vitamin C 1 Tablet(s) Oral daily        VITALS:  T(F): 98.7 (19 @ 16:40), Max: 99 (03-15-19 @ 22:01)  HR: 101 (19 @ 16:40)  BP: 120/74 (19 @ 16:40)  RR: 24 (19 @ 16:40)  SpO2: 99% (19 @ 16:40)  Wt(kg): --    03-15 @ 07:  -   @ 07:00  --------------------------------------------------------  IN: 200 mL / OUT: 0 mL / NET: 200 mL     @ 07: @ 20:37  --------------------------------------------------------  IN: 400 mL / OUT: 150 mL / NET: 250 mL        PHYSICAL EXAM:  Constitutional: NAD  HEENT: anicteric sclera, oropharynx clear.  Neck: No JVD  Respiratory: CTAB, no wheezes, rales or rhonchi  Cardiovascular: S1, S2, RRR  Gastrointestinal: BS+, soft, NT/ND  Extremities:  No peripheral edema  Neurological: A/O x 3, no focal deficits  : No CVA tenderness. No lund.   Skin: No rashes      LABS:  03-15    145  |  108  |  49<H>  ----------------------------<  84  4.4   |  24  |  2.00<H>    Ca    7.8<L>      15 Mar 2019 07:37      Creatinine Trend: 2.00 <--, 2.11 <--, 1.96 <--, 2.07 <--, 1.87 <--, 1.73 <--                        8.5    29.76 )-----------( 268      ( 16 Mar 2019 12:17 )             29.9     Urine Studies:  Urinalysis Basic - ( 13 Mar 2019 21:41 )    Color: Kristel / Appearance: Turbid / S.023 / pH:   Gluc:  / Ketone: Negative  / Bili: Negative / Urobili: 3 mg/dL   Blood:  / Protein: 30 mg/dL / Nitrite: Negative   Leuk Esterase: Large / RBC: 8 /HPF / WBC 22 /HPF   Sq Epi:  / Non Sq Epi: >27 /HPF / Bacteria: Moderate      Protein/Creatinine Ratio Calculation: 0.4 Ratio ( @ 21:27)  Creatinine, Random Urine: 142 mg/dL ( @ 21:27)  Potassium, Random Urine: 50 mmol/L ( @ 16:28)  Sodium, Random Urine: <20 mmol/L ( @ 16:28)  Chloride, Random Urine: <35 mmol/L ( @ 16:28)    RADIOLOGY & ADDITIONAL STUDIES:

## 2019-03-16 NOTE — PROGRESS NOTE ADULT - ASSESSMENT
79 yo F hx PMR, Pyoderma Gangrenosum (prior steroids, now on cyclosporine), s/p TAVR and explant of infected R Knee for MRSA (doxy suppression, spacer) admitted with fever to 102 and leukocytosis with bacteremia found to have YONATAN on CKD4    # stavle renal function. Lytes, acid base at goal.  #poor oral intake cont d5w  # HTN controlled

## 2019-03-16 NOTE — PROGRESS NOTE BEHAVIORAL HEALTH - NSBHCHARTREVIEWLAB_PSY_A_CORE FT
7.0    27.87 )-----------( 246      ( 15 Mar 2019 10:09 )             24.8     03-15    145  |  108  |  49<H>  ----------------------------<  84  4.4   |  24  |  2.00<H>    Ca    7.8<L>      15 Mar 2019 07:37
7.7    26.7  )-----------( 225      ( 14 Mar 2019 15:39 )             24.8     03-14    145  |  109<H>  |  49<H>  ----------------------------<  69<L>  4.6   |  24  |  2.11<H>    Ca    8.0<L>      14 Mar 2019 15:40      Urinalysis Basic - ( 13 Mar 2019 21:41 )    Color: Kristel / Appearance: Turbid / S.023 / pH: x  Gluc: x / Ketone: Negative  / Bili: Negative / Urobili: 3 mg/dL   Blood: x / Protein: 30 mg/dL / Nitrite: Negative   Leuk Esterase: Large / RBC: 8 /HPF / WBC 22 /HPF   Sq Epi: x / Non Sq Epi: >27 /HPF / Bacteria: Moderate
8.2    10.1  )-----------( 200      ( 15 Feb 2019 09:37 )             25.6     02-15    142  |  108  |  22  ----------------------------<  117<H>  4.3   |  22  |  1.16    Ca    9.0      15 Feb 2019 09:33

## 2019-03-16 NOTE — PROGRESS NOTE BEHAVIORAL HEALTH - NSBHCONSULTMEDS_PSY_A_CORE FT
Continue Lexapro 5mg po daily and melatonin to 3mg po qhs
Initiate Haldol 0.5mg p.o. at bedtime  Continue Lexapro 5mg po daily and melatonin to 3mg po qhs
Continue Lexapro 5mg po daily and melatonin to 3mg po qhs

## 2019-03-16 NOTE — PROGRESS NOTE ADULT - SUBJECTIVE AND OBJECTIVE BOX
Patient is a 78y old  Female who presents with a chief complaint of Cellulitis (16 Mar 2019 17:05)      SUBJECTIVE / OVERNIGHT EVENTS:  met with the son and grandson Rodrigo.  pt awake today.  pain with dressing changes.      Vital Signs Last 24 Hrs  T(C): 37.1 (16 Mar 2019 16:40), Max: 37.2 (15 Mar 2019 22:01)  T(F): 98.7 (16 Mar 2019 16:40), Max: 99 (15 Mar 2019 22:01)  HR: 101 (16 Mar 2019 16:40) (92 - 109)  BP: 120/74 (16 Mar 2019 16:40) (82/60 - 122/76)  BP(mean): --  RR: 24 (16 Mar 2019 16:40) (16 - 24)  SpO2: 99% (16 Mar 2019 16:40) (97% - 99%)  I&O's Summary    15 Mar 2019 07:01  -  16 Mar 2019 07:00  --------------------------------------------------------  IN: 200 mL / OUT: 0 mL / NET: 200 mL    16 Mar 2019 07:01  -  16 Mar 2019 20:16  --------------------------------------------------------  IN: 400 mL / OUT: 150 mL / NET: 250 mL      PHYSICAL EXAM:  GENERAL: comfortable, lying in bed, awake alert, morbidly obese  HEAD:  Atraumatic, Normocephalic  EYES: EOMI, PERRLA, conjunctiva and sclera clear  NECK: Supple, No JVD  CHEST/LUNG: mild decrease breath sounds bilaterally; No wheeze   HEART: Regular rate and rhythm; No murmurs, rubs, or gallops  ABDOMEN: Soft, Nontender, Nondistended; Bowel sounds present  BACK: multiple wounds/pressure ulcers all over her entire back   Neuro: awake alert   EXTREMITIES:  2+ Peripheral Pulses, No clubbing, cyanosis.  2+ nonpitting edema LE, UE trace edema ( improved)  Back: sacral and thigh wound. +wound vac in place  SKIN: No rashes or lesions    LABS:                        8.5    29.76 )-----------( 268      ( 16 Mar 2019 12:17 )             29.9     03-15    145  |  108  |  49<H>  ----------------------------<  84  4.4   |  24  |  2.00<H>    Ca    7.8<L>      15 Mar 2019 07:37        CAPILLARY BLOOD GLUCOSE                RADIOLOGY & ADDITIONAL TESTS:    Imaging Personally Reviewed:  [x] YES  [ ] NO    Consultant(s) Notes Reviewed:  [x] YES  [ ] NO      MEDICATIONS  (STANDING):  ascorbic acid 500 milliGRAM(s) Oral daily  aspirin enteric coated 81 milliGRAM(s) Oral daily  atorvastatin 10 milliGRAM(s) Oral at bedtime  bisacodyl 5 milliGRAM(s) Oral at bedtime  calcium carbonate 1250 mG  + Vitamin D (OsCal 500 + D) 1 Tablet(s) Oral three times a day  cholecalciferol 1000 Unit(s) Oral daily  cycloSPORINE  (SandIMMUNE)  Solution 75 milliGRAM(s) Oral two times a day  Dakins Solution - 1/4 Strength 1 Application(s) Topical two times a day  escitalopram 5 milliGRAM(s) Oral daily  folic acid 1 milliGRAM(s) Oral daily  heparin  Injectable 5000 Unit(s) SubCutaneous every 8 hours  metoprolol tartrate 12.5 milliGRAM(s) Oral two times a day  nystatin    Suspension [id number] Unit(s) Swish and Swallow three times a day  polyethylene glycol 3350 17 Gram(s) Oral daily  vitamin B complex with vitamin C 1 Tablet(s) Oral daily    MEDICATIONS  (PRN):  acetaminophen   Tablet .. 650 milliGRAM(s) Oral every 6 hours PRN Mild Pain (1 - 3)  acetaminophen   Tablet .. 650 milliGRAM(s) Oral every 6 hours PRN Temp greater or equal to 38C (100.4F)  acetaminophen  IVPB .. 1000 milliGRAM(s) IV Intermittent once PRN Moderate Pain (4 - 6)  melatonin 3 milliGRAM(s) Oral at bedtime PRN Insomnia  oxyCODONE    5 mG/acetaminophen 325 mG 1 Tablet(s) Oral every 6 hours PRN Severe Pain (7 - 10)      Care Discussed with Consultants/Other Providers [x] YES  [ ] NO    HEALTH ISSUES - PROBLEM Dx:  Delirium due to another medical condition  Need for prophylactic measure: Need for prophylactic measure  Hypernatremia: Hypernatremia  Edema: Edema  Bacteremia: Bacteremia  Iron deficiency anemia: Iron deficiency anemia  Hyperkalemia: Hyperkalemia  Acute kidney injury superimposed on CKD: Acute kidney injury superimposed on CKD  Acute kidney failure: Acute kidney failure  Pyoderma gangrenosum: Pyoderma gangrenosum  Depression, unspecified depression type: Depression, unspecified depression type  Medication management: Medication management  Gastroesophageal reflux disease, esophagitis presence not specified: Gastroesophageal reflux disease, esophagitis presence not specified  Aortic stenosis, severe: Aortic stenosis, severe  Coronary artery disease involving native coronary artery of native heart without angina pectoris: Coronary artery disease involving native coronary artery of native heart without angina pectoris  Altered mental status, unspecified altered mental status type: Altered mental status, unspecified altered mental status type  Cellulitis of leg, right: Cellulitis of leg, right

## 2019-03-16 NOTE — PROGRESS NOTE BEHAVIORAL HEALTH - NSBHFUPINTERVALHXFT_PSY_A_CORE
Pt. was in her bed. She was lethargic, she responded to verbal stimuli briefly and then fell back to sleep. As per her aide, patient was able to wake up a little bit and eat a little food. Pt. is not eating well as before and she is more withdrawn.    As per nursing staff, patient in pain and moaning when her wound dressings are being changed.
Pt. was in her bed. She was lethargic, she responded to verbal stimuli briefly and then fell back to sleep. As per her aid" Rosanna Horne - [phone], she reported pt was up at 9 am , she reported she wants to make it up to God, and pray and asked her to take her back home. Her aid reported then later, pt. was on [ain and received oxycodone. Pt. is not eating well as before and she is more depressed.
Patient seen and evaluated, awake, oriented to person, place (hospital- 8th floor), and date (Feb 15), states sleeping well last night.  Reports beng in the hospital for the last few weeks, states feels as if she is losing track of the time.  Denies depressive sx, denies SI, plan or intent.  No SE reported from Lexapro.

## 2019-03-17 LAB
ANION GAP SERPL CALC-SCNC: 14 MMOL/L — SIGNIFICANT CHANGE UP (ref 5–17)
BUN SERPL-MCNC: 60 MG/DL — HIGH (ref 7–23)
CALCIUM SERPL-MCNC: 7.4 MG/DL — LOW (ref 8.4–10.5)
CHLORIDE SERPL-SCNC: 106 MMOL/L — SIGNIFICANT CHANGE UP (ref 96–108)
CO2 SERPL-SCNC: 22 MMOL/L — SIGNIFICANT CHANGE UP (ref 22–31)
CREAT SERPL-MCNC: 2.23 MG/DL — HIGH (ref 0.5–1.3)
GLUCOSE BLDC GLUCOMTR-MCNC: 50 MG/DL — LOW (ref 70–99)
GLUCOSE SERPL-MCNC: 67 MG/DL — LOW (ref 70–99)
HCT VFR BLD CALC: 28.4 % — LOW (ref 34.5–45)
HGB BLD-MCNC: 8.4 G/DL — LOW (ref 11.5–15.5)
MCHC RBC-ENTMCNC: 27.1 PG — SIGNIFICANT CHANGE UP (ref 27–34)
MCHC RBC-ENTMCNC: 29.6 GM/DL — LOW (ref 32–36)
MCV RBC AUTO: 91.6 FL — SIGNIFICANT CHANGE UP (ref 80–100)
NRBC # BLD: 1 /100 WBCS — HIGH (ref 0–0)
PLATELET # BLD AUTO: 253 K/UL — SIGNIFICANT CHANGE UP (ref 150–400)
POTASSIUM SERPL-MCNC: 4.7 MMOL/L — SIGNIFICANT CHANGE UP (ref 3.5–5.3)
POTASSIUM SERPL-SCNC: 4.7 MMOL/L — SIGNIFICANT CHANGE UP (ref 3.5–5.3)
RBC # BLD: 3.1 M/UL — LOW (ref 3.8–5.2)
RBC # FLD: 21.2 % — HIGH (ref 10.3–14.5)
SODIUM SERPL-SCNC: 142 MMOL/L — SIGNIFICANT CHANGE UP (ref 135–145)
WBC # BLD: 26 K/UL — HIGH (ref 3.8–10.5)
WBC # FLD AUTO: 26 K/UL — HIGH (ref 3.8–10.5)

## 2019-03-17 RX ORDER — HYDROCORTISONE 20 MG
100 TABLET ORAL ONCE
Qty: 0 | Refills: 0 | Status: COMPLETED | OUTPATIENT
Start: 2019-03-17 | End: 2019-03-17

## 2019-03-17 RX ORDER — DEXTROSE 50 % IN WATER 50 %
50 SYRINGE (ML) INTRAVENOUS ONCE
Qty: 0 | Refills: 0 | Status: COMPLETED | OUTPATIENT
Start: 2019-03-17 | End: 2019-03-17

## 2019-03-17 RX ORDER — SODIUM CHLORIDE 9 MG/ML
500 INJECTION INTRAMUSCULAR; INTRAVENOUS; SUBCUTANEOUS ONCE
Qty: 0 | Refills: 0 | Status: COMPLETED | OUTPATIENT
Start: 2019-03-17 | End: 2019-03-17

## 2019-03-17 RX ORDER — HYDROMORPHONE HYDROCHLORIDE 2 MG/ML
0.5 INJECTION INTRAMUSCULAR; INTRAVENOUS; SUBCUTANEOUS ONCE
Qty: 0 | Refills: 0 | Status: DISCONTINUED | OUTPATIENT
Start: 2019-03-17 | End: 2019-03-17

## 2019-03-17 RX ADMIN — Medication 81 MILLIGRAM(S): at 11:32

## 2019-03-17 RX ADMIN — OXYCODONE AND ACETAMINOPHEN 1 TABLET(S): 5; 325 TABLET ORAL at 12:51

## 2019-03-17 RX ADMIN — ESCITALOPRAM OXALATE 5 MILLIGRAM(S): 10 TABLET, FILM COATED ORAL at 11:32

## 2019-03-17 RX ADMIN — Medication 500 MILLIGRAM(S): at 11:32

## 2019-03-17 RX ADMIN — OXYCODONE AND ACETAMINOPHEN 1 TABLET(S): 5; 325 TABLET ORAL at 17:26

## 2019-03-17 RX ADMIN — Medication 50 MILLILITER(S): at 23:50

## 2019-03-17 RX ADMIN — OXYCODONE AND ACETAMINOPHEN 1 TABLET(S): 5; 325 TABLET ORAL at 06:41

## 2019-03-17 RX ADMIN — Medication 1 TABLET(S): at 11:33

## 2019-03-17 RX ADMIN — HYDROMORPHONE HYDROCHLORIDE 0.5 MILLIGRAM(S): 2 INJECTION INTRAMUSCULAR; INTRAVENOUS; SUBCUTANEOUS at 18:26

## 2019-03-17 RX ADMIN — Medication 1000 UNIT(S): at 11:32

## 2019-03-17 RX ADMIN — HEPARIN SODIUM 5000 UNIT(S): 5000 INJECTION INTRAVENOUS; SUBCUTANEOUS at 17:44

## 2019-03-17 RX ADMIN — OXYCODONE AND ACETAMINOPHEN 1 TABLET(S): 5; 325 TABLET ORAL at 06:11

## 2019-03-17 RX ADMIN — HEPARIN SODIUM 5000 UNIT(S): 5000 INJECTION INTRAVENOUS; SUBCUTANEOUS at 05:55

## 2019-03-17 RX ADMIN — Medication 1 MILLIGRAM(S): at 11:32

## 2019-03-17 RX ADMIN — Medication 1 TABLET(S): at 11:32

## 2019-03-17 RX ADMIN — Medication 1 APPLICATION(S): at 19:20

## 2019-03-17 RX ADMIN — SODIUM CHLORIDE 1000 MILLILITER(S): 9 INJECTION INTRAMUSCULAR; INTRAVENOUS; SUBCUTANEOUS at 23:49

## 2019-03-17 RX ADMIN — HEPARIN SODIUM 5000 UNIT(S): 5000 INJECTION INTRAVENOUS; SUBCUTANEOUS at 22:06

## 2019-03-17 NOTE — PROGRESS NOTE ADULT - SUBJECTIVE AND OBJECTIVE BOX
Patient is a 78y old  Female who presents with a chief complaint of Cellulitis (17 Mar 2019 14:46)      SUBJECTIVE / OVERNIGHT EVENTS:  awake.  stable.  BP soft.  ate only a little bit per the aide.  +pain, on oxy        Vital Signs Last 24 Hrs  T(C): 36.4 (17 Mar 2019 15:36), Max: 36.8 (17 Mar 2019 12:09)  T(F): 97.6 (17 Mar 2019 15:36), Max: 98.3 (17 Mar 2019 12:09)  HR: 86 (17 Mar 2019 15:36) (86 - 101)  BP: 90/69 (17 Mar 2019 15:36) (90/69 - 113/72)  BP(mean): --  RR: 18 (17 Mar 2019 15:36) (16 - 18)  SpO2: 100% (17 Mar 2019 15:36) (95% - 100%)  I&O's Summary    16 Mar 2019 07:01  -  17 Mar 2019 07:00  --------------------------------------------------------  IN: 400 mL / OUT: 150 mL / NET: 250 mL    17 Mar 2019 07:01  -  17 Mar 2019 19:02  --------------------------------------------------------  IN: 80 mL / OUT: 0 mL / NET: 80 mL      PHYSICAL EXAM:  GENERAL: comfortable, lying in bed, awake alert, morbidly obese  HEAD:  Atraumatic, Normocephalic  EYES: EOMI, PERRLA, conjunctiva and sclera clear  NECK: Supple, No JVD  CHEST/LUNG: mild decrease breath sounds bilaterally; No wheeze   HEART: Regular rate and rhythm; No murmurs, rubs, or gallops  ABDOMEN: Soft, Nontender, Nondistended; Bowel sounds present  BACK: multiple wounds/pressure ulcers all over her entire back   Neuro: awake alert   EXTREMITIES:  2+ Peripheral Pulses, No clubbing, cyanosis.  2+ nonpitting edema LE, UE trace edema ( improved)  Back: sacral and thigh wound. +wound vac in place  SKIN: No rashes or lesions      LABS:                        8.4    26.00 )-----------( 253      ( 17 Mar 2019 08:47 )             28.4     03-17    142  |  106  |  60<H>  ----------------------------<  67<L>  4.7   |  22  |  2.23<H>    Ca    7.4<L>      17 Mar 2019 06:23        CAPILLARY BLOOD GLUCOSE                RADIOLOGY & ADDITIONAL TESTS:    Imaging Personally Reviewed:  [x] YES  [ ] NO    Consultant(s) Notes Reviewed:  [x] YES  [ ] NO      MEDICATIONS  (STANDING):  ascorbic acid 500 milliGRAM(s) Oral daily  aspirin enteric coated 81 milliGRAM(s) Oral daily  atorvastatin 10 milliGRAM(s) Oral at bedtime  bisacodyl 5 milliGRAM(s) Oral at bedtime  calcium carbonate 1250 mG  + Vitamin D (OsCal 500 + D) 1 Tablet(s) Oral three times a day  cholecalciferol 1000 Unit(s) Oral daily  cycloSPORINE  (SandIMMUNE)  Solution 75 milliGRAM(s) Oral two times a day  Dakins Solution - 1/4 Strength 1 Application(s) Topical two times a day  escitalopram 5 milliGRAM(s) Oral daily  folic acid 1 milliGRAM(s) Oral daily  heparin  Injectable 5000 Unit(s) SubCutaneous every 8 hours  metoprolol tartrate 12.5 milliGRAM(s) Oral two times a day  nystatin    Suspension 191955 Unit(s) Swish and Swallow three times a day  polyethylene glycol 3350 17 Gram(s) Oral daily  vitamin B complex with vitamin C 1 Tablet(s) Oral daily    MEDICATIONS  (PRN):  acetaminophen   Tablet .. 650 milliGRAM(s) Oral every 6 hours PRN Mild Pain (1 - 3)  acetaminophen   Tablet .. 650 milliGRAM(s) Oral every 6 hours PRN Temp greater or equal to 38C (100.4F)  acetaminophen  IVPB .. 1000 milliGRAM(s) IV Intermittent once PRN Moderate Pain (4 - 6)  melatonin 3 milliGRAM(s) Oral at bedtime PRN Insomnia  oxyCODONE    5 mG/acetaminophen 325 mG 1 Tablet(s) Oral every 6 hours PRN Severe Pain (7 - 10)      Care Discussed with Consultants/Other Providers [x] YES  [ ] NO    HEALTH ISSUES - PROBLEM Dx:  Delirium due to another medical condition  Need for prophylactic measure: Need for prophylactic measure  Hypernatremia: Hypernatremia  Edema: Edema  Bacteremia: Bacteremia  Iron deficiency anemia: Iron deficiency anemia  Hyperkalemia: Hyperkalemia  Acute kidney injury superimposed on CKD: Acute kidney injury superimposed on CKD  Acute kidney failure: Acute kidney failure  Pyoderma gangrenosum: Pyoderma gangrenosum  Depression, unspecified depression type: Depression, unspecified depression type  Medication management: Medication management  Gastroesophageal reflux disease, esophagitis presence not specified: Gastroesophageal reflux disease, esophagitis presence not specified  Aortic stenosis, severe: Aortic stenosis, severe  Coronary artery disease involving native coronary artery of native heart without angina pectoris: Coronary artery disease involving native coronary artery of native heart without angina pectoris  Altered mental status, unspecified altered mental status type: Altered mental status, unspecified altered mental status type  Cellulitis of leg, right: Cellulitis of leg, right

## 2019-03-17 NOTE — PROVIDER CONTACT NOTE (OTHER) - REASON
MEWS score of 7
Mews=7
No urine output for greater than 12 hours after lund removal
Potassium level 6.0
Pt only took half cyclosporin dose due at 1800
Pt unable to tolerate PO medication and turning and repositioning
temp 100.5
s/p RBC transfusion CBC lab results

## 2019-03-17 NOTE — PROVIDER CONTACT NOTE (OTHER) - DATE AND TIME:
10-Mar-2019 11:10
07-Mar-2019 08:00
11-Mar-2019 14:38
14-Mar-2019 08:05
17-Mar-2019 14:01
18-Feb-2019 18:51
25-Feb-2019 22:45
25-Feb-2019 23:30
08-Mar-2019 22:50

## 2019-03-17 NOTE — PROGRESS NOTE ADULT - SUBJECTIVE AND OBJECTIVE BOX
[facility] - Anna / Basil S /Frances/ JAMES Zamudio/ JAMES Irizarry/ Natalio Hays / BOB Njeru  ---------------------------------------------------------------------------------------------------------------    Patient seen and examined bedside    Subjective and Objective: No overnight events.  bedside, c/o pt has pain RUE, upset that RN didn't give Tylenol yet. pt stated "I don't have pain" later "I beg you, shut me down" couldn't elaberate. appears in some pain. NAD    Allergies: amoxicillin (Other)  IV Contrast (Flushing (Skin); Nausea)      Hospital Medications:   MEDICATIONS  (STANDING):  ascorbic acid 500 milliGRAM(s) Oral daily  aspirin enteric coated 81 milliGRAM(s) Oral daily  atorvastatin 10 milliGRAM(s) Oral at bedtime  bisacodyl 5 milliGRAM(s) Oral at bedtime  calcium carbonate 1250 mG  + Vitamin D (OsCal 500 + D) 1 Tablet(s) Oral three times a day  cholecalciferol 1000 Unit(s) Oral daily  cycloSPORINE  (SandIMMUNE)  Solution 75 milliGRAM(s) Oral two times a day  Dakins Solution - 1/4 Strength 1 Application(s) Topical two times a day  escitalopram 5 milliGRAM(s) Oral daily  folic acid 1 milliGRAM(s) Oral daily  heparin  Injectable 5000 Unit(s) SubCutaneous every 8 hours  metoprolol tartrate 12.5 milliGRAM(s) Oral two times a day  nystatin    Suspension 078518 Unit(s) Swish and Swallow three times a day  polyethylene glycol 3350 17 Gram(s) Oral daily  vitamin B complex with vitamin C 1 Tablet(s) Oral daily      VITALS:  T(F): 98.3 (19 @ 12:09), Max: 98.7 (19 @ 16:40)  HR: 93 (19 @ 12:09)  BP: 99/44 (19 @ 12:09)  RR: 16 (19 @ 12:09)  SpO2: 99% (19 @ 12:09)  Wt(kg): --     @ 07:01  -   @ 07:00  --------------------------------------------------------  IN: 400 mL / OUT: 150 mL / NET: 250 mL     @ 07:01  -   @ 14:46  --------------------------------------------------------  IN: 80 mL / OUT: 0 mL / NET: 80 mL      PHYSICAL EXAM:  Constitutional: NAD, obese  HEENT: anicteric sclera, oropharynx clear  Neck: No JVD  Respiratory: CTAB anteriorly, no wheezes, rales or rhonchi  Cardiovascular: S1, S2, RRR  Gastrointestinal: BS+, soft, NT  Extremities: No cyanosis or clubbing. 2+ nonpitting edema LE  Neurological: A/O x 1  : No lund.     LABS:      142  |  106  |  60<H>  ----------------------------<  67<L>  4.7   |  22  |  2.23<H>    Ca    7.4<L>      17 Mar 2019 06:23      Creatinine Trend: 2.23 <--, 2.00 <--, 2.11 <--, 1.96 <--, 2.07 <--, 1.87 <--                        8.4    26.00 )-----------( 253      ( 17 Mar 2019 08:47 )             28.4     Urine Studies:  Urinalysis Basic - ( 13 Mar 2019 21:41 )    Color: Kristel / Appearance: Turbid / S.023 / pH:   Gluc:  / Ketone: Negative  / Bili: Negative / Urobili: 3 mg/dL   Blood:  / Protein: 30 mg/dL / Nitrite: Negative   Leuk Esterase: Large / RBC: 8 /HPF / WBC 22 /HPF   Sq Epi:  / Non Sq Epi: >27 /HPF / Bacteria: Moderate      Protein/Creatinine Ratio Calculation: 0.4 Ratio ( @ 21:27)  Creatinine, Random Urine: 142 mg/dL ( @ 21:27)  Potassium, Random Urine: 50 mmol/L ( @ 16:28)  Sodium, Random Urine: <20 mmol/L ( @ 16:28)  Chloride, Random Urine: <35 mmol/L ( @ 16:28)      RADIOLOGY & ADDITIONAL STUDIES:

## 2019-03-17 NOTE — PROVIDER CONTACT NOTE (OTHER) - NAME OF MD/NP/PA/DO NOTIFIED:
KAYLA Melissa
MARSHA Melissa
MARSHA Rubio made aware
NP Clare
NP Clare
NP Nati
Nancy Cash NP
Nancy Cash NP
MARSHA Ross

## 2019-03-17 NOTE — PROGRESS NOTE ADULT - ASSESSMENT
79 yo F hx PMR, Pyoderma Gangrenosum (prior steroids, now on cyclosporine), s/p TAVR and explant of infected R Knee for MRSA (doxy suppression, spacer) admitted with fever to 102 and leukocytosis with bacteremia. Renal following for YONATAN on CKD4    #  YONATAN on CKD4- stable renal function. Lytes, acid base acceptable     poor oral intake  # HTN BP low-consider dec BB dose or d/c  # cellulitis- abxs, pain control per medicine    consider palliative eval    labs, chart reviewed

## 2019-03-17 NOTE — PROVIDER CONTACT NOTE (OTHER) - ACTION/TREATMENT ORDERED:
no further action at this time.
Sergio Rubio made aware. Will continue to monitor.
will order tylenol iv
I will continue to monitor orders and urine output
MARSHA Damian made aware. Will order rectal temp since platelets are WDL.
NP aware. NP will order tylenol, Blood cultures and CXR
Stat EKG, NP will order key-exalate
MARSHA Melissa aware, document and continue to try, no order at this time, NP to notify team, cont to monitor

## 2019-03-17 NOTE — PROGRESS NOTE ADULT - ASSESSMENT
· Assessment		  78 F PMH of CAD s/p stent to LAD, AS s/p TAVR, PPM, DVT (Jan '18) on coumadin, IVC filter, pyoderma gangrenosum, hx MRSA infections, and total knee replacement c/b joint infections s/p I&D explantation and spacer placement followed by spacer removal and static spacer placement with joint fusion due to infection of initial spacer with complex wound closure by plastic surgery, admission here in November 2018 for hip and thigh wounds, now presenting with confusion and chills at home associated with poor appetite and confusion x 1 week and not eating x 2 days. Noted with fever here, YONATAN on labwork. R knee evaluated by orthopedic surgery.	    Problem/Plan - 1:  ·  Problem: Bacteremia.  Plan: 2' S epidermidis  Daptomycin last day is 3/28/19; PICC placed 3/6/19  blood cultures from 2/28, 3/9 are negative.  ID f/u appreciated  TTE without discrete vegetation  Too high-risk for ERIKA  CT chest 2/11 revealing b/l pleural effusions  Poor prognosis overall. Palliative on the case.  Day 31/42 abx today.  If  refused hospice, then likely dc planning home with home care.   met with the son and grandson Rodrigo today, poor prognosis explained.       Problem/Plan - 2:  ·  Problem: Iron deficiency anemia.  Plan: in setting of CK3  s/p venofer 200 mg on 3/7 and 3/8  s/p 1 unit of pRBC on 3/9 and likely another one on 3/15  daily CBC.     Problem/Plan - 3:  ·  Problem: Pyoderma gangrenosum.  Plan: cyclosporine temporarily held on admission, but later restarted.   Cyclosporine dose adjusted to 100 mg BID based on levels  completed chronic steroids taper for pyoderma.  (she was on Prednisone 5 mg qdaily, tapered off)  Per orthopedic surgery can be OOB primarily to wheelchair and can 50% weight  wound care consult and wound vac management appreciated.     Problem/Plan - 4:  ·  Problem: Altered mental status, unspecified altered mental status type.  Plan: likely 2/2 metabolic encephalopathy given fever and concern for cellulitis  mental status stable  Patient also on narcotics but stable on current regimen.   c/w oxycodone to q6hr PRN.  Per the son, she gets q4hr sometime at home if pain is severe.   she is on home gabapentin 300 mg BID, neurontin tapered down to 100 mg bid and she is completely off for now due to lethargy.     Problem/Plan - 5:  ·  Problem: Acute kidney failure.  Plan: likely cardiorenal. intravascular depleted but grossly volume overloaded.   poor PO intake.   renal appreciated  Holding off diuretics for now  Repeat urine labs and bladder scan ordered 3/12/19    Problem/Plan - 6:  Problem: Coronary artery disease involving native coronary artery of native heart without angina pectoris. Plan: Cont Atovastatin 10 mg QHS.    Problem/Plan - 7:  ·  Problem: Aortic stenosis, severe.  Plan: Pt with severe AS, need to be cautious with fluids.     Problem/Plan - 8:  ·  Problem: Gastroesophageal reflux disease, esophagitis presence not specified.  Plan: Protonix.     Problem/Plan - 9:  ·  Problem: Depression, unspecified depression type.    c/w low dose Lexapro 5 mg.     Problem/Plan - 10:  Problem: Need for prophylactic measure. Plan; SC heparin 5000 units q8h.    Problem/Plan - 11:  ·  Problem: poor prognosis.  Appreciate palliative assistance as family members do not seem to grasp complexity of pt's condition.  Given history,  will likely take her home. Numerous conversations held in the past.

## 2019-03-18 LAB
ALBUMIN SERPL ELPH-MCNC: 0.7 G/DL — LOW (ref 3.3–5)
ALP SERPL-CCNC: 283 U/L — HIGH (ref 40–120)
ALT FLD-CCNC: 19 U/L — SIGNIFICANT CHANGE UP (ref 10–45)
ANION GAP SERPL CALC-SCNC: 12 MMOL/L — SIGNIFICANT CHANGE UP (ref 5–17)
ANION GAP SERPL CALC-SCNC: 12 MMOL/L — SIGNIFICANT CHANGE UP (ref 5–17)
APTT BLD: 47.7 SEC — HIGH (ref 27.5–36.3)
AST SERPL-CCNC: 25 U/L — SIGNIFICANT CHANGE UP (ref 10–40)
BILIRUB SERPL-MCNC: 0.6 MG/DL — SIGNIFICANT CHANGE UP (ref 0.2–1.2)
BUN SERPL-MCNC: 63 MG/DL — HIGH (ref 7–23)
BUN SERPL-MCNC: 63 MG/DL — HIGH (ref 7–23)
CALCIUM SERPL-MCNC: 7.2 MG/DL — LOW (ref 8.4–10.5)
CALCIUM SERPL-MCNC: 7.6 MG/DL — LOW (ref 8.4–10.5)
CHLORIDE SERPL-SCNC: 108 MMOL/L — SIGNIFICANT CHANGE UP (ref 96–108)
CHLORIDE SERPL-SCNC: 108 MMOL/L — SIGNIFICANT CHANGE UP (ref 96–108)
CO2 SERPL-SCNC: 20 MMOL/L — LOW (ref 22–31)
CO2 SERPL-SCNC: 23 MMOL/L — SIGNIFICANT CHANGE UP (ref 22–31)
CREAT SERPL-MCNC: 2.49 MG/DL — HIGH (ref 0.5–1.3)
CREAT SERPL-MCNC: 2.49 MG/DL — HIGH (ref 0.5–1.3)
GAS PNL BLDA: SIGNIFICANT CHANGE UP
GLUCOSE BLDC GLUCOMTR-MCNC: 102 MG/DL — HIGH (ref 70–99)
GLUCOSE BLDC GLUCOMTR-MCNC: 116 MG/DL — HIGH (ref 70–99)
GLUCOSE BLDC GLUCOMTR-MCNC: 77 MG/DL — SIGNIFICANT CHANGE UP (ref 70–99)
GLUCOSE BLDC GLUCOMTR-MCNC: 81 MG/DL — SIGNIFICANT CHANGE UP (ref 70–99)
GLUCOSE SERPL-MCNC: 107 MG/DL — HIGH (ref 70–99)
GLUCOSE SERPL-MCNC: 252 MG/DL — HIGH (ref 70–99)
HCT VFR BLD CALC: 26.1 % — LOW (ref 34.5–45)
HGB BLD-MCNC: 8.3 G/DL — LOW (ref 11.5–15.5)
INR BLD: 2.27 RATIO — HIGH (ref 0.88–1.16)
MAGNESIUM SERPL-MCNC: 1.9 MG/DL — SIGNIFICANT CHANGE UP (ref 1.6–2.6)
MCHC RBC-ENTMCNC: 28.3 PG — SIGNIFICANT CHANGE UP (ref 27–34)
MCHC RBC-ENTMCNC: 31.8 GM/DL — LOW (ref 32–36)
MCV RBC AUTO: 89 FL — SIGNIFICANT CHANGE UP (ref 80–100)
PHOSPHATE SERPL-MCNC: 4.8 MG/DL — HIGH (ref 2.5–4.5)
PLATELET # BLD AUTO: 208 K/UL — SIGNIFICANT CHANGE UP (ref 150–400)
POTASSIUM SERPL-MCNC: 5.1 MMOL/L — SIGNIFICANT CHANGE UP (ref 3.5–5.3)
POTASSIUM SERPL-MCNC: 5.4 MMOL/L — HIGH (ref 3.5–5.3)
POTASSIUM SERPL-SCNC: 5.1 MMOL/L — SIGNIFICANT CHANGE UP (ref 3.5–5.3)
POTASSIUM SERPL-SCNC: 5.4 MMOL/L — HIGH (ref 3.5–5.3)
PROT SERPL-MCNC: 4.1 G/DL — LOW (ref 6–8.3)
PROTHROM AB SERPL-ACNC: 26.6 SEC — HIGH (ref 10–12.9)
RBC # BLD: 2.93 M/UL — LOW (ref 3.8–5.2)
RBC # FLD: 19.7 % — HIGH (ref 10.3–14.5)
SODIUM SERPL-SCNC: 140 MMOL/L — SIGNIFICANT CHANGE UP (ref 135–145)
SODIUM SERPL-SCNC: 143 MMOL/L — SIGNIFICANT CHANGE UP (ref 135–145)
WBC # BLD: 27.8 K/UL — HIGH (ref 3.8–10.5)
WBC # FLD AUTO: 27.8 K/UL — HIGH (ref 3.8–10.5)

## 2019-03-18 RX ORDER — ACETAMINOPHEN 500 MG
1000 TABLET ORAL ONCE
Qty: 0 | Refills: 0 | Status: COMPLETED | OUTPATIENT
Start: 2019-03-18 | End: 2019-03-18

## 2019-03-18 RX ORDER — HYDROMORPHONE HYDROCHLORIDE 2 MG/ML
1 INJECTION INTRAMUSCULAR; INTRAVENOUS; SUBCUTANEOUS ONCE
Qty: 0 | Refills: 0 | Status: DISCONTINUED | OUTPATIENT
Start: 2019-03-18 | End: 2019-03-18

## 2019-03-18 RX ORDER — SODIUM CHLORIDE 9 MG/ML
1000 INJECTION, SOLUTION INTRAVENOUS
Qty: 0 | Refills: 0 | Status: DISCONTINUED | OUTPATIENT
Start: 2019-03-18 | End: 2019-03-19

## 2019-03-18 RX ADMIN — Medication 400 MILLIGRAM(S): at 16:27

## 2019-03-18 RX ADMIN — DAPTOMYCIN 128 MILLIGRAM(S): 500 INJECTION, POWDER, LYOPHILIZED, FOR SOLUTION INTRAVENOUS at 06:30

## 2019-03-18 RX ADMIN — HEPARIN SODIUM 5000 UNIT(S): 5000 INJECTION INTRAVENOUS; SUBCUTANEOUS at 22:31

## 2019-03-18 RX ADMIN — Medication 1 TABLET(S): at 22:31

## 2019-03-18 RX ADMIN — Medication 100 MILLIGRAM(S): at 00:00

## 2019-03-18 RX ADMIN — HEPARIN SODIUM 5000 UNIT(S): 5000 INJECTION INTRAVENOUS; SUBCUTANEOUS at 06:30

## 2019-03-18 RX ADMIN — Medication 1 TABLET(S): at 12:26

## 2019-03-18 RX ADMIN — Medication 500 MILLIGRAM(S): at 12:26

## 2019-03-18 RX ADMIN — Medication 1 TABLET(S): at 12:27

## 2019-03-18 RX ADMIN — OXYCODONE AND ACETAMINOPHEN 1 TABLET(S): 5; 325 TABLET ORAL at 12:20

## 2019-03-18 RX ADMIN — SODIUM CHLORIDE 50 MILLILITER(S): 9 INJECTION, SOLUTION INTRAVENOUS at 00:05

## 2019-03-18 RX ADMIN — ESCITALOPRAM OXALATE 5 MILLIGRAM(S): 10 TABLET, FILM COATED ORAL at 12:26

## 2019-03-18 RX ADMIN — Medication 1000 UNIT(S): at 12:26

## 2019-03-18 RX ADMIN — Medication 1 MILLIGRAM(S): at 12:26

## 2019-03-18 RX ADMIN — HEPARIN SODIUM 5000 UNIT(S): 5000 INJECTION INTRAVENOUS; SUBCUTANEOUS at 12:27

## 2019-03-18 RX ADMIN — Medication 81 MILLIGRAM(S): at 12:26

## 2019-03-18 RX ADMIN — OXYCODONE AND ACETAMINOPHEN 1 TABLET(S): 5; 325 TABLET ORAL at 09:30

## 2019-03-18 RX ADMIN — Medication 1 APPLICATION(S): at 22:30

## 2019-03-18 RX ADMIN — HYDROMORPHONE HYDROCHLORIDE 1 MILLIGRAM(S): 2 INJECTION INTRAMUSCULAR; INTRAVENOUS; SUBCUTANEOUS at 16:27

## 2019-03-18 RX ADMIN — ATORVASTATIN CALCIUM 10 MILLIGRAM(S): 80 TABLET, FILM COATED ORAL at 22:30

## 2019-03-18 NOTE — PROGRESS NOTE ADULT - ASSESSMENT
· Assessment		  78 F PMH of CAD s/p stent to LAD, AS s/p TAVR, PPM, DVT (Jan '18) on coumadin, IVC filter, pyoderma gangrenosum, hx MRSA infections, and total knee replacement c/b joint infections s/p I&D explantation and spacer placement followed by spacer removal and static spacer placement with joint fusion due to infection of initial spacer with complex wound closure by plastic surgery, admission here in November 2018 for hip and thigh wounds, now presenting with confusion and chills at home associated with poor appetite and confusion x 1 week and not eating x 2 days. Noted with fever here, YONATAN on labwork. R knee evaluated by orthopedic surgery.	    Problem/Plan - 1:  ·  Problem: Bacteremia.  Plan: 2' S epidermidis  Daptomycin last day is 3/28/19; PICC placed 3/6/19  blood cultures from 2/28, 3/9 are negative.  ID f/u appreciated  TTE without discrete vegetation  Too high-risk for ERIKA  CT chest 2/11 revealing b/l pleural effusions  Poor prognosis overall. Palliative on the case.  Day 32/42 abx today.  If  refused hospice, then likely dc planning home with home care.   met with the son and grandson Rodrigo today, poor prognosis explained.       Problem/Plan - 2:  ·  Problem: Iron deficiency anemia.  Plan: in setting of CK3  s/p venofer 200 mg on 3/7 and 3/8  s/p 1 unit of pRBC on 3/9 and likely another one on 3/15  daily CBC.     Problem/Plan - 3:  ·  Problem: Pyoderma gangrenosum.  Plan: cyclosporine temporarily held on admission, but later restarted.   Cyclosporine dose adjusted to 100 mg BID based on levels  completed chronic steroids taper for pyoderma.  (she was on Prednisone 5 mg qdaily, tapered off)  Per orthopedic surgery can be OOB primarily to wheelchair and can 50% weight  wound care consult and wound vac management appreciated.     Problem/Plan - 4:  ·  Problem: Altered mental status, unspecified altered mental status type.  Plan: likely 2/2 metabolic encephalopathy given fever and concern for cellulitis  mental status stable  Patient also on narcotics but stable on current regimen.   c/w oxycodone to q6hr PRN.  Per the son, she gets q4hr sometime at home if pain is severe.   she is on home gabapentin 300 mg BID, neurontin tapered down to 100 mg bid and she is completely off for now due to lethargy.     Problem/Plan - 5:  ·  Problem: Acute kidney failure.  Plan: likely cardiorenal. intravascular depleted but grossly volume overloaded.   poor PO intake.   renal appreciated  Holding off diuretics for now  Repeat urine labs and bladder scan ordered 3/12/19    Problem/Plan - 6:  Problem: Coronary artery disease involving native coronary artery of native heart without angina pectoris. Plan: Cont Atovastatin 10 mg QHS.    Problem/Plan - 7:  ·  Problem: Aortic stenosis, severe.  Plan: Pt with severe AS, need to be cautious with fluids.     Problem/Plan - 8:  ·  Problem: Gastroesophageal reflux disease, esophagitis presence not specified.  Plan: Protonix.     Problem/Plan - 9:  ·  Problem: Depression, unspecified depression type.    c/w low dose Lexapro 5 mg.     Problem/Plan - 10:  Problem: Need for prophylactic measure. Plan; SC heparin 5000 units q8h.    Problem/Plan - 11:  ·  Problem: poor prognosis. I have numerous discussion with different family members.   D/w the older son Thony over the phone over 30 mins.   d/w the grandson Didi past week.   MOLST forms sign. the  [place] now wants DNR/DNI, verbal consent.   The son Thony agress with conservative approach. Palliative family meeting tomorrow.   home hospice vs. inpatient hospice explained.

## 2019-03-18 NOTE — CHART NOTE - NSCHARTNOTEFT_GEN_A_CORE
RRT called for hypotension/inability for BP cuff to read pressure. Briefly, pt is 78 F PMH of CAD s/p stent to LAD, AS s/p TAVR, PPM, DVT (Jan '18) on coumadin, IVC filter, pyoderma gangrenosum, hx MRSA infections, and total knee replacement c/b joint infections a/w bacteremia likely from multiple skin wounds on daptomycin. On my exam, pt lethargic but arousable to sternal rub. Withdraws extremities upon noxious stimuli. Lungs CTA, HR RRR. Vitals as per flow sheet, pt afebrile, RR 16 satting 94-99% on 2L. Nurse at bedside reports pt at baseline is lethargic, AOxO with minimal PO intake over the past couple of days. FS obtained was 50. Pt was treated with 1 amp of dextrose with improvement to 116. Manual BP obtained was 90/60. In the setting of likely poor PO intake and hypovolemia, pt was bolused 500cc NS and started on maintain fluids with D5 @50. As per chart review, pt on cyclosporine and chronic prednisone which was recently tapered off. Decision was made to treat with a 1x dose of 100 mg hydrocortisone for possible component of adrenal insufficiency. Repeat BP was 106/60. Pt noted to be more awake and alert.   NP was able to contact  and obtain verbal consent to make the patient DNR/DNI.     Recs:   -Check another FS in 1hr, if stable can check q6h while on maintenance fluids   -Continued GOC with family regarding pressors  -Careful monitoring of volume status following IVF  -consider checking am cortisol, continuing stress dose steroids     [place]  [id number] RRT called for hypotension/inability for BP cuff to read pressure. Briefly, pt is 78 F PMH of CAD s/p stent to LAD, AS s/p TAVR, PPM, DVT (Jan '18) on coumadin, IVC filter, pyoderma gangrenosum, hx MRSA infections, and total knee replacement c/b joint infections a/w bacteremia likely from multiple skin wounds on daptomycin. On my exam, pt lethargic but arousable to sternal rub. Withdraws extremities upon noxious stimuli. Lungs CTA, HR RRR. Vitals as per flow sheet, pt afebrile, Manual BP obtained was 90/60, HR 80s RR 16 satting 94-99% on 2L. Nurse at bedside reports pt at baseline is lethargic, AOxO with minimal PO intake over the past couple of days. FS obtained was 50. Pt was treated with 1 amp of dextrose with improvement to 116.  In the setting of likely poor PO intake and hypovolemia, pt was bolused 500cc NS and started on maintain fluids with D5 @50. As per chart review, pt on cyclosporine and chronic prednisone which was recently tapered off. Decision was made to treat with a 1x dose of 100 mg hydrocortisone for possible component of adrenal insufficiency. Repeat BP was 106/60. Pt noted to be more awake and alert.   NP was able to contact  and obtain verbal consent to make the patient DNR/DNI.     Recs:   -Check another FS in 1hr, if stable can check q6h while on maintenance fluids   -Continued GOC with family regarding pressors  -Careful monitoring of volume status following IVF  -consider continuing stress dose steroids if high suspicion for adrenal insufficiency     [place]  [id number]

## 2019-03-18 NOTE — PROGRESS NOTE ADULT - ASSESSMENT
77 yo F hx PMR, Pyoderma Gangrenosum (prior steroids, now on cyclosporine), s/p TAVR and explant of infected R Knee for MRSA (doxy suppression, spacer) admitted with fever to 102 and leukocytosis with bacteremia. Renal following for YONATAN on CKD4    #  YONATAN on CKD4- worsening renal function, poor oral intake, labs reviewed and still consistent with pre-renal. Please increase D5 NS to 75cc/hr  # Hyperkalemia: Treat medically, likely secondary to steroids  # HTN BP low-consider dec BB dose or d/c  # cellulitis- abxs, pain control per medicine    consider palliative eval    labs, chart reviewed

## 2019-03-18 NOTE — PROGRESS NOTE ADULT - SUBJECTIVE AND OBJECTIVE BOX
Patient is a 78y old  Female who presents with a chief complaint of Cellulitis (18 Mar 2019 11:22)      SUBJECTIVE / OVERNIGHT EVENTS:  RRT overnight for hypotension and hypoglycemia.  IVF started. D5NS started.  this am awake.  I had a long discussion with the son Thony, all questions answered.  pain is okay but worse at times.   lethargy limits pain meds use.         Vital Signs Last 24 Hrs  T(C): 36.3 (18 Mar 2019 16:04), Max: 36.8 (18 Mar 2019 01:49)  T(F): 97.4 (18 Mar 2019 16:04), Max: 98.3 (18 Mar 2019 01:49)  HR: 87 (18 Mar 2019 16:04) (69 - 87)  BP: 139/78 (18 Mar 2019 16:04) (103/67 - 139/78)  BP(mean): --  RR: 18 (18 Mar 2019 16:04) (18 - 18)  SpO2: 98% (18 Mar 2019 16:04) (95% - 98%)  I&O's Summary    17 Mar 2019 07:01  -  18 Mar 2019 07:00  --------------------------------------------------------  IN: 544 mL / OUT: 0 mL / NET: 544 mL      PHYSICAL EXAM:  GENERAL: comfortable, lying in bed, awake alert, morbidly obese  HEAD:  Atraumatic, Normocephalic  EYES: EOMI, PERRLA, conjunctiva and sclera clear  NECK: Supple, No JVD  CHEST/LUNG: mild decrease breath sounds bilaterally; No wheeze   HEART: Regular rate and rhythm; No murmurs, rubs, or gallops  ABDOMEN: Soft, Nontender, Nondistended; Bowel sounds present  BACK: multiple wounds/pressure ulcers all over her entire back   Neuro: awake alert   EXTREMITIES:  2+ Peripheral Pulses, No clubbing, cyanosis.  2+ nonpitting edema LE, UE trace edema stable  Back: sacral and thigh wound. +wound vac in place  SKIN: No rashes or lesions      LABS:                        8.3    27.8  )-----------( 208      ( 18 Mar 2019 00:16 )             26.1     03-18    143  |  108  |  63<H>  ----------------------------<  107<H>  5.4<H>   |  23  |  2.49<H>    Ca    7.6<L>      18 Mar 2019 07:22  Phos  4.8     03-18  Mg     1.9     03-18    TPro  4.1<L>  /  Alb  0.7<L>  /  TBili  0.6  /  DBili  x   /  AST  25  /  ALT  19  /  AlkPhos  283<H>  03-18    PT/INR - ( 18 Mar 2019 00:16 )   PT: 26.6 sec;   INR: 2.27 ratio         PTT - ( 18 Mar 2019 00:16 )  PTT:47.7 sec  CAPILLARY BLOOD GLUCOSE      POCT Blood Glucose.: 102 mg/dL (18 Mar 2019 08:28)  POCT Blood Glucose.: 81 mg/dL (18 Mar 2019 03:24)  POCT Blood Glucose.: 77 mg/dL (18 Mar 2019 01:42)  POCT Blood Glucose.: 116 mg/dL (18 Mar 2019 00:01)  POCT Blood Glucose.: 50 mg/dL (17 Mar 2019 23:40)            RADIOLOGY & ADDITIONAL TESTS:    Imaging Personally Reviewed:  [x] YES  [ ] NO    Consultant(s) Notes Reviewed:  [x] YES  [ ] NO      MEDICATIONS  (STANDING):  ascorbic acid 500 milliGRAM(s) Oral daily  aspirin enteric coated 81 milliGRAM(s) Oral daily  atorvastatin 10 milliGRAM(s) Oral at bedtime  bisacodyl 5 milliGRAM(s) Oral at bedtime  calcium carbonate 1250 mG  + Vitamin D (OsCal 500 + D) 1 Tablet(s) Oral three times a day  cholecalciferol 1000 Unit(s) Oral daily  cycloSPORINE  (SandIMMUNE)  Solution 75 milliGRAM(s) Oral two times a day  Dakins Solution - 1/4 Strength 1 Application(s) Topical two times a day  DAPTOmycin IVPB 700 milliGRAM(s) IV Intermittent every 48 hours  dextrose 5% + sodium chloride 0.9%. 1000 milliLiter(s) (50 mL/Hr) IV Continuous <Continuous>  escitalopram 5 milliGRAM(s) Oral daily  folic acid 1 milliGRAM(s) Oral daily  heparin  Injectable 5000 Unit(s) SubCutaneous every 8 hours  metoprolol tartrate 12.5 milliGRAM(s) Oral two times a day  nystatin    Suspension 975237 Unit(s) Swish and Swallow three times a day  polyethylene glycol 3350 17 Gram(s) Oral daily  vitamin B complex with vitamin C 1 Tablet(s) Oral daily    MEDICATIONS  (PRN):  acetaminophen   Tablet .. 650 milliGRAM(s) Oral every 6 hours PRN Mild Pain (1 - 3)  acetaminophen   Tablet .. 650 milliGRAM(s) Oral every 6 hours PRN Temp greater or equal to 38C (100.4F)  acetaminophen  IVPB .. 1000 milliGRAM(s) IV Intermittent once PRN Moderate Pain (4 - 6)  melatonin 3 milliGRAM(s) Oral at bedtime PRN Insomnia  oxyCODONE    5 mG/acetaminophen 325 mG 1 Tablet(s) Oral every 6 hours PRN Severe Pain (7 - 10)      Care Discussed with Consultants/Other Providers [x] YES  [ ] NO    HEALTH ISSUES - PROBLEM Dx:  Delirium due to another medical condition  Need for prophylactic measure: Need for prophylactic measure  Hypernatremia: Hypernatremia  Edema: Edema  Bacteremia: Bacteremia  Iron deficiency anemia: Iron deficiency anemia  Hyperkalemia: Hyperkalemia  Acute kidney injury superimposed on CKD: Acute kidney injury superimposed on CKD  Acute kidney failure: Acute kidney failure  Pyoderma gangrenosum: Pyoderma gangrenosum  Depression, unspecified depression type: Depression, unspecified depression type  Medication management: Medication management  Gastroesophageal reflux disease, esophagitis presence not specified: Gastroesophageal reflux disease, esophagitis presence not specified  Aortic stenosis, severe: Aortic stenosis, severe  Coronary artery disease involving native coronary artery of native heart without angina pectoris: Coronary artery disease involving native coronary artery of native heart without angina pectoris  Altered mental status, unspecified altered mental status type: Altered mental status, unspecified altered mental status type  Cellulitis of leg, right: Cellulitis of leg, right

## 2019-03-18 NOTE — PROGRESS NOTE ADULT - NSHPATTENDINGPLANDISCUSS_GEN_ALL_CORE
pt and her aide at bedside. I had a long discussion with the son Thony, all questions answered. d/w renal.

## 2019-03-18 NOTE — PROGRESS NOTE ADULT - SUBJECTIVE AND OBJECTIVE BOX
[facility] - KIRTI Castillo / KIRTI Gracia / RITA Daniels/ KIRTI Zamudio/ KIRTI Irizarry/ LOLLY Hays / CLINT Forbes / LEBRON Boss  ---------------------------------------------------------------------------------------------------------------  seen and examined today for YONATAN  Interval : worsening renal function  VITALS:  T(F): 97.7 (03-18-19 @ 09:18), Max: 98.3 (03-17-19 @ 12:09)  HR: 69 (03-18-19 @ 09:18)  BP: 110/73 (03-18-19 @ 09:18)  RR: 18 (03-18-19 @ 09:18)  SpO2: 96% (03-18-19 @ 09:18)  Wt(kg): --    03-17 @ 07:01  -  03-18 @ 07:00  --------------------------------------------------------  IN: 544 mL / OUT: 0 mL / NET: 544 mL      Physical Exam :-  Constitutional: NAD  Neck: Supple.  Respiratory: Bilateral equal breath sounds,  Cardiovascular: S1, S2 normal,  Gastrointestinal: Bowel Sounds present, soft, non tender.  Extremities: Trace edema  Neurological: Alert and Oriented x 0  Psychiatric: Normal mood, normal affect  Data:-  Allergies :   amoxicillin (Other)  IV Contrast (Flushing (Skin); Nausea)    Hospital Medications:   MEDICATIONS  (STANDING):  ascorbic acid 500 milliGRAM(s) Oral daily  aspirin enteric coated 81 milliGRAM(s) Oral daily  atorvastatin 10 milliGRAM(s) Oral at bedtime  bisacodyl 5 milliGRAM(s) Oral at bedtime  calcium carbonate 1250 mG  + Vitamin D (OsCal 500 + D) 1 Tablet(s) Oral three times a day  cholecalciferol 1000 Unit(s) Oral daily  cycloSPORINE  (SandIMMUNE)  Solution 75 milliGRAM(s) Oral two times a day  Dakins Solution - 1/4 Strength 1 Application(s) Topical two times a day  DAPTOmycin IVPB 700 milliGRAM(s) IV Intermittent every 48 hours  dextrose 5% + sodium chloride 0.9%. 1000 milliLiter(s) (50 mL/Hr) IV Continuous <Continuous>  escitalopram 5 milliGRAM(s) Oral daily  folic acid 1 milliGRAM(s) Oral daily  heparin  Injectable 5000 Unit(s) SubCutaneous every 8 hours  metoprolol tartrate 12.5 milliGRAM(s) Oral two times a day  nystatin    Suspension 910133 Unit(s) Swish and Swallow three times a day  polyethylene glycol 3350 17 Gram(s) Oral daily  vitamin B complex with vitamin C 1 Tablet(s) Oral daily    03-18    143  |  108  |  63<H>  ----------------------------<  107<H>  5.4<H>   |  23  |  2.49<H>    Ca    7.6<L>      18 Mar 2019 07:22  Phos  4.8     03-18  Mg     1.9     03-18    TPro  4.1<L>  /  Alb  0.7<L>  /  TBili  0.6  /  DBili      /  AST  25  /  ALT  19  /  AlkPhos  283<H>  03-18    Creatinine Trend: 2.49 <--, 2.49 <--, 2.23 <--, 2.00 <--, 2.11 <--, 1.96 <--, 2.07 <--                        8.3    27.8  )-----------( 208      ( 18 Mar 2019 00:16 )             26.1

## 2019-03-18 NOTE — PROGRESS NOTE ADULT - NSICDXPROBLEM_GEN_ALL_CORE_FT
PROBLEM DIAGNOSES  Problem: Edema  Assessment and Plan:     Problem: Hyperkalemia  Assessment and Plan:     Problem: Acute kidney failure  Assessment and Plan: Worsening Edema noted  please change IVF to D5W @ 60cc/hr  Encourage PO intake  Poor prognosis

## 2019-03-18 NOTE — CHART NOTE - NSCHARTNOTEFT_GEN_A_CORE
Notified by RN that could not obtain BP on patient and seems more lethargic than usual. RRT activated. FS noted to be 50. NS bolus and D5 NS x10 hours ordered. Please see full RRT note.     Spoke with , Yonatan, regarding code status and stated he wanted her to be DNR/DNI. MOLST form put in chart to fill out in AM.   Discussed above with Dr. Downey.     Will continue to monitor   will endorse to day team     Cordelia Nguyen PA-C   [id number] Notified by RN that could not obtain BP on patient and seems more lethargic than usual. RRT activated. FS noted to be 50. NS bolus and D5 NS x10 hours ordered. Please see full RRT note.     Spoke with , Yonatan, regarding code status and stated he wanted her to be DNR/DNI. MOLST form put in chart to fill out in AM.   Discussed above with Dr. Downey.     Will continue to monitor   will endorse to day team     Cordelia Nguyen PA-C   [id number]    Addendum:   Patient seen and examined post RRT. AxOx0. Lethargic, opens eyes to voice and sternal rub. FS = 77     Vital Signs Last 24 Hrs  T(C): 36.8 (18 Mar 2019 01:49), Max: 36.8 (17 Mar 2019 12:09)  T(F): 98.3 (18 Mar 2019 01:49), Max: 98.3 (17 Mar 2019 12:09)  HR: 87 (18 Mar 2019 01:49) (86 - 101)  BP: 103/67 (18 Mar 2019 01:49) (90/69 - 113/72)  BP(mean): --  RR: 18 (18 Mar 2019 01:49) (16 - 18)  SpO2: 95% (18 Mar 2019 01:49) (95% - 100%)      Physical Exam:   Constitutional: AOx0. lethargic    Cardiovascular: +S1 S2. RRR.  No murmurs.  No LE edema.  Respiratory:  Even, unlabored.  Clear lungs bilaterally. No wheezing, rhonchi, or crackles.  Gastrointestinal: +BS X4 active. Soft. Nontender. Nondistended   Extremities/Vascular: +2 pulses bilaterally.   Skin:  warm, dry    Continue D5 +NS   monitor FS   monitor volume status  continue regimen    Cordelia Nguyen PA-C   [id number]

## 2019-03-19 DIAGNOSIS — R53.81 OTHER MALAISE: ICD-10-CM

## 2019-03-19 DIAGNOSIS — R53.2 FUNCTIONAL QUADRIPLEGIA: ICD-10-CM

## 2019-03-19 DIAGNOSIS — Z87.2 PERSONAL HISTORY OF DISEASES OF THE SKIN AND SUBCUTANEOUS TISSUE: ICD-10-CM

## 2019-03-19 DIAGNOSIS — G93.40 ENCEPHALOPATHY, UNSPECIFIED: ICD-10-CM

## 2019-03-19 DIAGNOSIS — R78.81 BACTEREMIA: ICD-10-CM

## 2019-03-19 DIAGNOSIS — Z51.5 ENCOUNTER FOR PALLIATIVE CARE: ICD-10-CM

## 2019-03-19 LAB
ANION GAP SERPL CALC-SCNC: 13 MMOL/L — SIGNIFICANT CHANGE UP (ref 5–17)
ANISOCYTOSIS BLD QL: SLIGHT — SIGNIFICANT CHANGE UP
BASOPHILS # BLD AUTO: 0.08 K/UL — SIGNIFICANT CHANGE UP (ref 0–0.2)
BASOPHILS NFR BLD AUTO: 0.2 % — SIGNIFICANT CHANGE UP (ref 0–2)
BUN SERPL-MCNC: 68 MG/DL — HIGH (ref 7–23)
CALCIUM SERPL-MCNC: 7.9 MG/DL — LOW (ref 8.4–10.5)
CHLORIDE SERPL-SCNC: 108 MMOL/L — SIGNIFICANT CHANGE UP (ref 96–108)
CO2 SERPL-SCNC: 22 MMOL/L — SIGNIFICANT CHANGE UP (ref 22–31)
CREAT SERPL-MCNC: 2.94 MG/DL — HIGH (ref 0.5–1.3)
EOSINOPHIL # BLD AUTO: 0 K/UL — SIGNIFICANT CHANGE UP (ref 0–0.5)
EOSINOPHIL NFR BLD AUTO: 0 % — SIGNIFICANT CHANGE UP (ref 0–6)
GLUCOSE BLDC GLUCOMTR-MCNC: 107 MG/DL — HIGH (ref 70–99)
GLUCOSE BLDC GLUCOMTR-MCNC: 118 MG/DL — HIGH (ref 70–99)
GLUCOSE BLDC GLUCOMTR-MCNC: 119 MG/DL — HIGH (ref 70–99)
GLUCOSE SERPL-MCNC: 90 MG/DL — SIGNIFICANT CHANGE UP (ref 70–99)
HCT VFR BLD CALC: 31.4 % — LOW (ref 34.5–45)
HGB BLD-MCNC: 9 G/DL — LOW (ref 11.5–15.5)
IMM GRANULOCYTES NFR BLD AUTO: 4.2 % — HIGH (ref 0–1.5)
LYMPHOCYTES # BLD AUTO: 3.37 K/UL — HIGH (ref 1–3.3)
LYMPHOCYTES # BLD AUTO: 9.5 % — LOW (ref 13–44)
MANUAL SMEAR VERIFICATION: SIGNIFICANT CHANGE UP
MCHC RBC-ENTMCNC: 26.8 PG — LOW (ref 27–34)
MCHC RBC-ENTMCNC: 28.7 GM/DL — LOW (ref 32–36)
MCV RBC AUTO: 93.5 FL — SIGNIFICANT CHANGE UP (ref 80–100)
MONOCYTES # BLD AUTO: 1.1 K/UL — HIGH (ref 0–0.9)
MONOCYTES NFR BLD AUTO: 3.1 % — SIGNIFICANT CHANGE UP (ref 2–14)
NEUTROPHILS # BLD AUTO: 29.44 K/UL — HIGH (ref 1.8–7.4)
NEUTROPHILS NFR BLD AUTO: 83 % — HIGH (ref 43–77)
NRBC # BLD: 2 /100 WBCS — HIGH (ref 0–0)
PLAT MORPH BLD: NORMAL — SIGNIFICANT CHANGE UP
PLATELET # BLD AUTO: 243 K/UL — SIGNIFICANT CHANGE UP (ref 150–400)
POTASSIUM SERPL-MCNC: 5.5 MMOL/L — HIGH (ref 3.5–5.3)
POTASSIUM SERPL-SCNC: 5.5 MMOL/L — HIGH (ref 3.5–5.3)
RBC # BLD: 3.36 M/UL — LOW (ref 3.8–5.2)
RBC # FLD: 22.8 % — HIGH (ref 10.3–14.5)
RBC BLD AUTO: ABNORMAL
SODIUM SERPL-SCNC: 143 MMOL/L — SIGNIFICANT CHANGE UP (ref 135–145)
WBC # BLD: 35.48 K/UL — HIGH (ref 3.8–10.5)
WBC # FLD AUTO: 35.48 K/UL — HIGH (ref 3.8–10.5)

## 2019-03-19 PROCEDURE — 99233 SBSQ HOSP IP/OBS HIGH 50: CPT

## 2019-03-19 PROCEDURE — 99498 ADVNCD CARE PLAN ADDL 30 MIN: CPT

## 2019-03-19 PROCEDURE — 99497 ADVNCD CARE PLAN 30 MIN: CPT

## 2019-03-19 RX ORDER — SODIUM CHLORIDE 9 MG/ML
1000 INJECTION, SOLUTION INTRAVENOUS
Qty: 0 | Refills: 0 | Status: DISCONTINUED | OUTPATIENT
Start: 2019-03-19 | End: 2019-03-20

## 2019-03-19 RX ORDER — DEXTROSE 50 % IN WATER 50 %
50 SYRINGE (ML) INTRAVENOUS ONCE
Qty: 0 | Refills: 0 | Status: COMPLETED | OUTPATIENT
Start: 2019-03-19 | End: 2019-03-19

## 2019-03-19 RX ORDER — ALBUTEROL 90 UG/1
2.5 AEROSOL, METERED ORAL ONCE
Qty: 0 | Refills: 0 | Status: COMPLETED | OUTPATIENT
Start: 2019-03-19 | End: 2019-03-19

## 2019-03-19 RX ORDER — FUROSEMIDE 40 MG
20 TABLET ORAL ONCE
Qty: 0 | Refills: 0 | Status: COMPLETED | OUTPATIENT
Start: 2019-03-19 | End: 2019-03-19

## 2019-03-19 RX ORDER — INSULIN HUMAN 100 [IU]/ML
10 INJECTION, SOLUTION SUBCUTANEOUS ONCE
Qty: 0 | Refills: 0 | Status: COMPLETED | OUTPATIENT
Start: 2019-03-19 | End: 2019-03-19

## 2019-03-19 RX ORDER — SODIUM CHLORIDE 9 MG/ML
1000 INJECTION, SOLUTION INTRAVENOUS
Qty: 0 | Refills: 0 | Status: DISCONTINUED | OUTPATIENT
Start: 2019-03-19 | End: 2019-03-19

## 2019-03-19 RX ADMIN — OXYCODONE AND ACETAMINOPHEN 1 TABLET(S): 5; 325 TABLET ORAL at 07:39

## 2019-03-19 RX ADMIN — Medication 1 TABLET(S): at 12:01

## 2019-03-19 RX ADMIN — INSULIN HUMAN 10 UNIT(S): 100 INJECTION, SOLUTION SUBCUTANEOUS at 18:25

## 2019-03-19 RX ADMIN — Medication 1 MILLIGRAM(S): at 11:52

## 2019-03-19 RX ADMIN — Medication 50 MILLILITER(S): at 18:26

## 2019-03-19 RX ADMIN — POLYETHYLENE GLYCOL 3350 17 GRAM(S): 17 POWDER, FOR SOLUTION ORAL at 11:53

## 2019-03-19 RX ADMIN — ATORVASTATIN CALCIUM 10 MILLIGRAM(S): 80 TABLET, FILM COATED ORAL at 21:43

## 2019-03-19 RX ADMIN — Medication 81 MILLIGRAM(S): at 11:52

## 2019-03-19 RX ADMIN — HEPARIN SODIUM 5000 UNIT(S): 5000 INJECTION INTRAVENOUS; SUBCUTANEOUS at 06:46

## 2019-03-19 RX ADMIN — ALBUTEROL 2.5 MILLIGRAM(S): 90 AEROSOL, METERED ORAL at 18:42

## 2019-03-19 RX ADMIN — Medication 1 TABLET(S): at 11:52

## 2019-03-19 RX ADMIN — OXYCODONE AND ACETAMINOPHEN 1 TABLET(S): 5; 325 TABLET ORAL at 06:45

## 2019-03-19 RX ADMIN — Medication 500000 UNIT(S): at 11:52

## 2019-03-19 RX ADMIN — Medication 400 MILLIGRAM(S): at 13:11

## 2019-03-19 RX ADMIN — CYCLOSPORINE 75 MILLIGRAM(S): 100 CAPSULE ORAL at 09:23

## 2019-03-19 RX ADMIN — HEPARIN SODIUM 5000 UNIT(S): 5000 INJECTION INTRAVENOUS; SUBCUTANEOUS at 11:53

## 2019-03-19 RX ADMIN — Medication 500 MILLIGRAM(S): at 11:52

## 2019-03-19 RX ADMIN — HEPARIN SODIUM 5000 UNIT(S): 5000 INJECTION INTRAVENOUS; SUBCUTANEOUS at 21:40

## 2019-03-19 RX ADMIN — Medication 20 MILLIGRAM(S): at 18:25

## 2019-03-19 RX ADMIN — OXYCODONE AND ACETAMINOPHEN 1 TABLET(S): 5; 325 TABLET ORAL at 18:26

## 2019-03-19 RX ADMIN — Medication 1000 MILLIGRAM(S): at 14:49

## 2019-03-19 RX ADMIN — ESCITALOPRAM OXALATE 5 MILLIGRAM(S): 10 TABLET, FILM COATED ORAL at 12:01

## 2019-03-19 RX ADMIN — Medication 1000 UNIT(S): at 11:51

## 2019-03-19 RX ADMIN — Medication 1 APPLICATION(S): at 14:00

## 2019-03-19 RX ADMIN — OXYCODONE AND ACETAMINOPHEN 1 TABLET(S): 5; 325 TABLET ORAL at 17:23

## 2019-03-19 RX ADMIN — SODIUM CHLORIDE 60 MILLILITER(S): 9 INJECTION, SOLUTION INTRAVENOUS at 21:33

## 2019-03-19 RX ADMIN — Medication 1 APPLICATION(S): at 21:32

## 2019-03-19 NOTE — PROGRESS NOTE ADULT - ASSESSMENT
· Assessment		  78 F PMH of CAD s/p stent to LAD, AS s/p TAVR, PPM, DVT (Jan '18) on coumadin, IVC filter, pyoderma gangrenosum, hx MRSA infections, and total knee replacement c/b joint infections s/p I&D explantation and spacer placement followed by spacer removal and static spacer placement with joint fusion due to infection of initial spacer with complex wound closure by plastic surgery, admission here in November 2018 for hip and thigh wounds, now presenting with confusion and chills at home associated with poor appetite and confusion x 1 week and not eating x 2 days. Noted with fever here, YONATAN on labwork. R knee evaluated by orthopedic surgery.	    Problem/Plan - 1:  ·  Problem: Bacteremia.  Plan: 2' S epidermidis  Daptomycin last day is 3/28/19; PICC placed 3/6/19  blood cultures from 2/28, 3/9 are negative.  ID f/u appreciated  TTE without discrete vegetation, too high-risk for ERIKA and would not   CT chest 2/11 revealing b/l pleural effusions  Poor prognosis overall. Palliative on the case.  Meeting today at 3 pm to[place]. Pt is hospice candidate.  If  refused hospice, then likely dc planning home with home care.       Problem/Plan - 2:  ·  Problem: Iron deficiency anemia.  Plan: in setting of CK3  s/p venofer 200 mg on 3/7 and 3/8  s/p 1 unit of pRBC on 3/9 and likely another one on 3/15  hgb stable    Problem/Plan - 3:  ·  Problem: Pyoderma gangrenosum.  Plan: c/w yclosporine dose adjusted to 100 mg BID based on levels  completed chronic steroids taper for pyoderma, (she was on Prednisone 5 mg qdaily, tapered off on admission)  Per orthopedic surgery can be OOB primarily to wheelchair and can 50% weight  wound care consult and wound vac management appreciated.     Problem/Plan - 4:  ·  Problem: Altered mental status, unspecified altered mental status type.  Plan: likely 2/2 metabolic encephalopathy given fever and concern for cellulitis  mental status stable  Patient also on narcotics but stable on current regimen.   c/w oxycodone to q6hr PRN.  Per the son, she gets q4hr sometime at home if pain is severe.   she is on home gabapentin 300 mg BID, neurontin tapered down to 100 mg bid and she is completely off for now due to lethargy.     Problem/Plan - 5:  ·  Problem: Acute kidney failure.  Plan: likely cardiorenal. intravascular depleted but grossly volume overloaded.   poor PO intake.   renal appreciated  Holding off diuretics for now  going back and forth with intravascular depletion s/p IVF vs. gross extravascular volume overload  guarded prognosis.     Problem/Plan - 6:  Problem: Coronary artery disease involving native coronary artery of native heart without angina pectoris. Plan: Cont Atovastatin 10 mg QHS.    Problem/Plan - 7:  ·  Problem: Aortic stenosis, severe.  Plan: Pt with severe AS, need to be cautious with fluids.     Problem/Plan - 8:  ·  Problem: Gastroesophageal reflux disease, esophagitis presence not specified.  Plan: Protonix.     Problem/Plan - 9:  ·  Problem: Depression, unspecified depression type.    c/w low dose Lexapro 5 mg.     Problem/Plan - 10:  Problem: Need for prophylactic measure. Plan; SC heparin 5000 units q8h.    Problem/Plan - 11:  ·  Problem: poor prognosis. I have numerous discussion with different family members.   D/w the older son Thony and pt's  [place]. I d/w the grandson Didi past week.  MOLST forms sign. the  [place] now wants DNR/DNI, verbal consent.   The son Thony agrees with conservative approach. Palliative family meeting today at 3 pm.   home hospice vs. inpatient hospice explained.

## 2019-03-19 NOTE — CHART NOTE - NSCHARTNOTEFT_GEN_A_CORE
Upon Nutritional Assessment by the Registered Dietitian your patient was determined to meet criteria / has evidence of the following diagnosis/diagnoses:          [ ]  Mild Protein Calorie Malnutrition        [ ]  Moderate Protein Calorie Malnutrition        [x] Severe Protein Calorie Malnutrition        [ ] Unspecified Protein Calorie Malnutrition        [ ] Underweight / BMI <19        [ ] Morbid Obesity / BMI > 40      Findings as based on:  [x] Comprehensive nutrition assessment   [ ] Nutrition Focused Physical Exam  [ ] Other:       Nutrition Plan/Recommendations:      Consider diet liberalization and continue c supplements as consistent c pt/family wishes.     PROVIDER Section:     By signing this assessment you are acknowledging and agree with the diagnosis/diagnoses assigned by the Registered Dietitian    Comments:

## 2019-03-19 NOTE — PROGRESS NOTE ADULT - SUBJECTIVE AND OBJECTIVE BOX
[facility] - KIRTI Castillo / KIRTI Gracia / RITA Daniels/ KIRTI Zamudio/ KIRTI Irizarry/ LOLLY Hays / CLINT Forbes / LEBRON Boss  ---------------------------------------------------------------------------------------------------------------  seen and examined today for YONATAN  Interval : worsening serum creatinine   VITALS:  T(F): 97.8 (03-19-19 @ 06:40), Max: 97.8 (03-19-19 @ 06:40)  HR: 90 (03-19-19 @ 06:40)  BP: 121/76 (03-19-19 @ 06:40)  RR: 18 (03-19-19 @ 06:40)  SpO2: 96% (03-19-19 @ 06:40)  Wt(kg): --    03-19 @ 07:01  -  03-19 @ 13:56  --------------------------------------------------------  IN: 100 mL / OUT: 0 mL / NET: 100 mL      Physical Exam :-  Constitutional: NAD  Neck: Supple.  Respiratory: Bilateral equal breath sounds,  Cardiovascular: S1, S2 normal,  Gastrointestinal: Bowel Sounds present, soft, non tender.  Extremities: Anasarca 2+  Neurological: confused, AAOx1  Psychiatric: Normal mood, normal affect  Data:-  Allergies :   amoxicillin (Other)  IV Contrast (Flushing (Skin); Nausea)    Hospital Medications:   MEDICATIONS  (STANDING):  ascorbic acid 500 milliGRAM(s) Oral daily  aspirin enteric coated 81 milliGRAM(s) Oral daily  atorvastatin 10 milliGRAM(s) Oral at bedtime  bisacodyl 5 milliGRAM(s) Oral at bedtime  calcium carbonate 1250 mG  + Vitamin D (OsCal 500 + D) 1 Tablet(s) Oral three times a day  cholecalciferol 1000 Unit(s) Oral daily  cycloSPORINE  (SandIMMUNE)  Solution 75 milliGRAM(s) Oral two times a day  Dakins Solution - 1/4 Strength 1 Application(s) Topical two times a day  DAPTOmycin IVPB 700 milliGRAM(s) IV Intermittent every 48 hours  dextrose 5% + sodium chloride 0.9%. 1000 milliLiter(s) (50 mL/Hr) IV Continuous <Continuous>  escitalopram 5 milliGRAM(s) Oral daily  folic acid 1 milliGRAM(s) Oral daily  heparin  Injectable 5000 Unit(s) SubCutaneous every 8 hours  metoprolol tartrate 12.5 milliGRAM(s) Oral two times a day  nystatin    Suspension 622736 Unit(s) Swish and Swallow three times a day  polyethylene glycol 3350 17 Gram(s) Oral daily  vitamin B complex with vitamin C 1 Tablet(s) Oral daily    03-19    143  |  108  |  68<H>  ----------------------------<  90  5.5<H>   |  22  |  2.94<H>    Ca    7.9<L>      19 Mar 2019 07:26  Phos  4.8     03-18  Mg     1.9     03-18    TPro  4.1<L>  /  Alb  0.7<L>  /  TBili  0.6  /  DBili      /  AST  25  /  ALT  19  /  AlkPhos  283<H>  03-18    Creatinine Trend: 2.94 <--, 2.49 <--, 2.49 <--, 2.23 <--, 2.00 <--, 2.11 <--, 1.96 <--                        8.3    27.8  )-----------( 208      ( 18 Mar 2019 00:16 )             26.1

## 2019-03-19 NOTE — PROGRESS NOTE ADULT - SUBJECTIVE AND OBJECTIVE BOX
HHA present  Lethargic  Verbal at times   Tolerating po at times (pills and sometimes a modified diet)  Confused                          7.4    29.81 )-----------( 284      ( 13 Mar 2019 13:27 )             25.9         Urinalysis Basic - ( 13 Mar 2019 21:41 )    Color: Kristel / Appearance: Turbid / S.023 / pH: x  Gluc: x / Ketone: Negative  / Bili: Negative / Urobili: 3 mg/dL   Blood: x / Protein: 30 mg/dL / Nitrite: Negative   Leuk Esterase: Large / RBC: 8 /HPF / WBC 22 /HPF   Sq Epi: x / Non Sq Epi: >27 /HPF / Bacteria: Moderate      blood and urine cultures                        PERTINENT PM/SXH:   CAD (coronary artery disease)  Aortic stenosis, severe  Uncomplicated asthma, unspecified asthma severity  Polymyalgia  Lumbar disc disease with radiculopathy  Spider veins  Anxiety  Severe aortic stenosis by prior echocardiogram  Dysphagia  Morbid obesity with BMI of 50.0-59.9, adult  Macular degeneration  Hiatal hernia  GERD (gastroesophageal reflux disease)  Sciatica  Osteoarthritis  HTN (hypertension)    S/P TKR (total knee replacement)  S/P TAVR (transcatheter aortic valve replacement)  Artificial cardiac pacemaker  H/O cardiac catheterization  H/O endoscopy  S/P cataract surgery  S/P gastroplasty  S/P cholecystectomy  S/P total knee replacement  S/P total knee replacement  S/P hysterectomy    FAMILY HISTORY:  No pertinent family history in first degree relatives    ITEMS NOT CHECKED ARE NOT PRESENT    SOCIAL HISTORY:   Significant other/partner:  [x ]  Children:  [x ]  Latter day/Spirituality:  Substance hx:  [ ]   Tobacco hx:  [x ]   Alcohol hx: [ ]   Home Opioid hx:  [x ] I-Stop Reference No:  Living Situation: [ x]Home  [ ]Long term care  [ ]Rehab [ ]Other    ADVANCE DIRECTIVES:     MOLST  [ ]  Living Will  [ ]   DECISION MAKER(s):  [ ] Health Care Proxy(s)  [ x] Surrogate(s)    [ ] Guardian           Name(s): Phone Number(s):    BASELINE (I)ADL(s) (prior to admission):  [place]: [ ]Total  [ ] Moderate [ ]Dependent    Allergies    amoxicillin (Other)    Intolerances    IV Contrast (Flushing (Skin); Nausea)  MEDICATIONS  (STANDING):  ascorbic acid 500 milliGRAM(s) Oral daily  aspirin enteric coated 81 milliGRAM(s) Oral daily  atorvastatin 10 milliGRAM(s) Oral at bedtime  bisacodyl 5 milliGRAM(s) Oral at bedtime  calcium carbonate 1250 mG  + Vitamin D (OsCal 500 + D) 1 Tablet(s) Oral three times a day  cholecalciferol 1000 Unit(s) Oral daily  cycloSPORINE  (SandIMMUNE)  Solution 75 milliGRAM(s) Oral two times a day  Dakins Solution - 1/4 Strength 1 Application(s) Topical two times a day  DAPTOmycin IVPB 700 milliGRAM(s) IV Intermittent every 24 hours  dextrose 5%. 1000 milliLiter(s) (100 mL/Hr) IV Continuous <Continuous>  escitalopram 5 milliGRAM(s) Oral daily  folic acid 1 milliGRAM(s) Oral daily  gabapentin 100 milliGRAM(s) Oral two times a day  heparin  Injectable 5000 Unit(s) SubCutaneous every 8 hours  meropenem  IVPB 1000 milliGRAM(s) IV Intermittent every 12 hours  metoprolol tartrate 12.5 milliGRAM(s) Oral two times a day  nystatin    Suspension [id number] Unit(s) Swish and Swallow three times a day  polyethylene glycol 3350 17 Gram(s) Oral daily  vitamin B complex with vitamin C 1 Tablet(s) Oral daily    MEDICATIONS  (PRN):  acetaminophen   Tablet .. 650 milliGRAM(s) Oral every 6 hours PRN Mild Pain (1 - 3)  acetaminophen   Tablet .. 650 milliGRAM(s) Oral every 6 hours PRN Temp greater or equal to 38C (100.4F)  melatonin 3 milliGRAM(s) Oral at bedtime PRN Insomnia  oxyCODONE    5 mG/acetaminophen 325 mG 1 Tablet(s) Oral every 6 hours PRN Severe Pain (7 - 10)    PRESENT SYMPTOMS: [ ]Unable to obtain due to poor mentation   Source if other than patient:  [ x]Family/HHA   [ ]Team     Pain (Impact on QOL):    Location -       LE thigh  Minimal acceptable level (0-10 scale):                    Aggravating factors -  Quality -  Radiation -  Severity (0-10 scale) -    Timing -    PAIN AD Score:     [email or web address] (press ctrl +  left click to view)    Dyspnea:                           [ ]Mild [ ]Moderate [ ]Severe  Anxiety:                             [ ]Mild [ ]Moderate [ ]Severe  Fatigue:                             [ ]Mild [x ]Moderate [ ]Severe  Nausea:                             [ ]Mild [ ]Moderate [ ]Severe  Loss of appetite:              [ ]Mild [ ]Moderate [ ]Severe  Constipation:                    [ ]Mild [ ]Moderate [ ]Severe    Other Symptoms:  [x ]All other review of systems negative     Karnofsky Performance Score/Palliative Performance Status Version 2:     30    %    http://palliative.info/resource_material/[email or web address].pdf  PHYSICAL EXAM:  Vital Signs Last 24 Hrs  T(C): 36.8 (12 Mar 2019 07:44), Max: 37.6 (11 Mar 2019 14:30)  T(F): 98.2 (12 Mar 2019 07:44), Max: 99.6 (11 Mar 2019 14:30)  HR: 92 (12 Mar 2019 07:44) (91 - 104)  BP: 106/73 (12 Mar 2019 07:44) (92/64 - 133/66)  BP(mean): --  RR: 18 (12 Mar 2019 07:44) (18 - 20)  SpO2: 98% (12 Mar 2019 07:44) (95% - 98%) I&O's Summary    11 Mar 2019 07:01  -  12 Mar 2019 07:00  --------------------------------------------------------  IN: 2160 mL / OUT: 251 mL / NET: 1909 mL    12 Mar 2019 07:01  -  12 Mar 2019 13:18  --------------------------------------------------------  IN: 400 mL / OUT: 0 mL / NET: 400 mL    GENERAL:  [ ]Alert  [ ]Oriented x 1   [ x]Lethargic  [ ]Cachexia  [ ]Unarousable  [ ]Verbal  [ ]Non-Verbal  Behavioral:   [ ] Anxiety  [ ] Delirium [ ] Agitation [ ] Other  HEENT:  [ ]Normal   x[ ]Dry mouth   [ ]ET Tube/Trach  [ ]Oral lesions  PULMONARY:   [x ]Clear [ ]Tachypnea  [ ]Audible excessive secretions   [ ]Rhonchi        [ ]Right [ ]Left [ ]Bilateral  [ ]Crackles        [ ]Right [ ]Left [ ]Bilateral  [ ]Wheezing     [ ]Right [ ]Left [ ]Bilateral  CARDIOVASCULAR:    [ x]Regular [ ]Irregular [ ]Tachy  [ ]Srikanth [ ]Murmur [ ]Other  GASTROINTESTINAL:  [x ]Soft  [ ]Distended   [ ]+BS  [ ]Non tender [ ]Tender  [ ]PEG [ ]OGT/ NGT  Last BM:   GENITOURINARY:  [ ]Normal [ ] Incontinent   [ ]Oliguria/Anuria   [ ]Navarrete  MUSCULOSKELETAL:   [ ]Normal   [ x]Weakness  [ ]Bed/Wheelchair bound [ ]Edema  NEUROLOGIC:   [ ]No focal deficits  [x ] Cognitive impairment  [ ] Dysphagia [ ]Dysarthria [ ] Paresis [ ]Other   SKIN:   [ ]Normal   [ ]Pressure ulcer(s)  [ ]Rash lesions/phyoderma    CRITICAL CARE:  [ ] Shock Present  [ ]Septic [ ]Cardiogenic [ ]Neurologic [ ]Hypovolemic  [ ]  Vasopressors [ ]  Inotropes   [ ] Respiratory failure present  [ ] Acute  [ ] Chronic [ ] Hypoxic  [ ] Hypercarbic [ ] Other  [ ] Other organ failure     LABS:                        7.2    25.0  )-----------( 252      ( 12 Mar 2019 07:35 )             22.7   03-12    145  |  108  |  42<H>  ----------------------------<  119<H>  4.4   |  25  |  2.07<H>    Ca    8.1<L>      12 Mar 2019 07:34          RADIOLOGY & ADDITIONAL STUDIES:    PROTEIN CALORIE MALNUTRITION PRESENT: [ ] Yes [ ] No  [ ] PPSV2 < or = to 30% [ ] significant weight loss  [ ] poor nutritional intake [ ] catabolic state [ ] anasarca     Albumin, Serum: 3.1 g/dL (19 @ 11:49)  Artificial Nutrition [ ]     REFERRALS:   [ ]Chaplaincy  [ ] Hospice  [ ]Child Life  [ ]Social Work  [ ]Case management [ ]Holistic Therapy   Goals of Care Discussion Document:

## 2019-03-19 NOTE — PROGRESS NOTE ADULT - ASSESSMENT
77 yo F hx PMR, Pyoderma Gangrenosum (prior steroids, now on cyclosporine), s/p TAVR and explant of infected R Knee for MRSA (doxy suppression, spacer) admitted with fever to 102 and leukocytosis with bacteremia. Renal following for YONATAN on CKD4    #  YONATAN on CKD4- worsening renal function, poor oral intake, labs reviewed and still consistent with pre-renal. Please increase D5 NS to 75cc/hr  # Hyperkalemia: D50 2 amps, Insulin, albuterol and if BP will tolerate would give lasix 20mg IV once  # HTN BP low-consider dec BB dose or d/c  # cellulitis- abxs, pain control per medicine    consider palliative eval    labs, chart reviewed

## 2019-03-19 NOTE — PROGRESS NOTE ADULT - ATTENDING COMMENTS
Sammy Mendosa will be covering for me starting 3/20/19. He can be reached at  if needed.     - Dr. DENISSE Palumbo ([facility])  - [phone]

## 2019-03-19 NOTE — PROGRESS NOTE ADULT - SUBJECTIVE AND OBJECTIVE BOX
Patient is a 78y old  Female who presents with a chief complaint of Cellulitis (19 Mar 2019 13:56)      SUBJECTIVE / OVERNIGHT EVENTS:  edema getting worse.  sleepy today.  the aide at bedside.  she ate a little bit this am.         Vital Signs Last 24 Hrs  T(C): 36.2 (19 Mar 2019 12:00), Max: 36.6 (19 Mar 2019 06:40)  T(F): 97.1 (19 Mar 2019 12:00), Max: 97.8 (19 Mar 2019 06:40)  HR: 91 (19 Mar 2019 12:00) (86 - 91)  BP: 97/67 (19 Mar 2019 12:00) (97/67 - 121/76)  BP(mean): --  RR: 18 (19 Mar 2019 12:00) (18 - 18)  SpO2: 96% (19 Mar 2019 12:00) (95% - 98%)  I&O's Summary    19 Mar 2019 07:01  -  19 Mar 2019 16:07  --------------------------------------------------------  IN: 200 mL / OUT: 150 mL / NET: 50 mL      PHYSICAL EXAM:  GENERAL: comfortable, lying in bed, awake alert, morbidly obese  HEAD:  Atraumatic, Normocephalic  EYES: EOMI, PERRLA, conjunctiva and sclera clear  NECK: Supple, No JVD  CHEST/LUNG: mild decrease breath sounds bilaterally; No wheeze   HEART: Regular rate and rhythm; No murmurs, rubs, or gallops  ABDOMEN: Soft, Nontender, Nondistended; Bowel sounds present  BACK: multiple wounds/pressure ulcers all over her entire back   Neuro: awake alert   EXTREMITIES:  2+ Peripheral Pulses, No clubbing, cyanosis.  2+ nonpitting edema LE, UE trace edema stable  Back: sacral and thigh wound. +wound vac in place  SKIN: No rashes or lesions      LABS:                        8.3    27.8  )-----------( 208      ( 18 Mar 2019 00:16 )             26.1     03-19    143  |  108  |  68<H>  ----------------------------<  90  5.5<H>   |  22  |  2.94<H>    Ca    7.9<L>      19 Mar 2019 07:26  Phos  4.8     03-18  Mg     1.9     03-18    TPro  4.1<L>  /  Alb  0.7<L>  /  TBili  0.6  /  DBili  x   /  AST  25  /  ALT  19  /  AlkPhos  283<H>  03-18    PT/INR - ( 18 Mar 2019 00:16 )   PT: 26.6 sec;   INR: 2.27 ratio         PTT - ( 18 Mar 2019 00:16 )  PTT:47.7 sec  CAPILLARY BLOOD GLUCOSE      POCT Blood Glucose.: 118 mg/dL (19 Mar 2019 13:09)  POCT Blood Glucose.: 107 mg/dL (19 Mar 2019 02:57)            RADIOLOGY & ADDITIONAL TESTS:    Imaging Personally Reviewed:  [x] YES  [ ] NO    Consultant(s) Notes Reviewed:  [x] YES  [ ] NO      MEDICATIONS  (STANDING):  ascorbic acid 500 milliGRAM(s) Oral daily  aspirin enteric coated 81 milliGRAM(s) Oral daily  atorvastatin 10 milliGRAM(s) Oral at bedtime  bisacodyl 5 milliGRAM(s) Oral at bedtime  calcium carbonate 1250 mG  + Vitamin D (OsCal 500 + D) 1 Tablet(s) Oral three times a day  cholecalciferol 1000 Unit(s) Oral daily  cycloSPORINE  (SandIMMUNE)  Solution 75 milliGRAM(s) Oral two times a day  Dakins Solution - 1/4 Strength 1 Application(s) Topical two times a day  DAPTOmycin IVPB 700 milliGRAM(s) IV Intermittent every 48 hours  dextrose 5% + sodium chloride 0.9%. 1000 milliLiter(s) (50 mL/Hr) IV Continuous <Continuous>  escitalopram 5 milliGRAM(s) Oral daily  folic acid 1 milliGRAM(s) Oral daily  heparin  Injectable 5000 Unit(s) SubCutaneous every 8 hours  metoprolol tartrate 12.5 milliGRAM(s) Oral two times a day  nystatin    Suspension [id number] Unit(s) Swish and Swallow three times a day  polyethylene glycol 3350 17 Gram(s) Oral daily  vitamin B complex with vitamin C 1 Tablet(s) Oral daily    MEDICATIONS  (PRN):  acetaminophen   Tablet .. 650 milliGRAM(s) Oral every 6 hours PRN Mild Pain (1 - 3)  acetaminophen   Tablet .. 650 milliGRAM(s) Oral every 6 hours PRN Temp greater or equal to 38C (100.4F)  melatonin 3 milliGRAM(s) Oral at bedtime PRN Insomnia  oxyCODONE    5 mG/acetaminophen 325 mG 1 Tablet(s) Oral every 6 hours PRN Severe Pain (7 - 10)      Care Discussed with Consultants/Other Providers [x] YES  [ ] NO    HEALTH ISSUES - PROBLEM Dx:  Functional quadriplegia  Bacteremia  Debility  Palliative care encounter  History of pyoderma  Encephalopathy  Delirium due to another medical condition  Need for prophylactic measure: Need for prophylactic measure  Hypernatremia: Hypernatremia  Edema: Edema  Bacteremia: Bacteremia  Iron deficiency anemia: Iron deficiency anemia  Hyperkalemia: Hyperkalemia  Acute kidney injury superimposed on CKD: Acute kidney injury superimposed on CKD  Acute kidney failure: Acute kidney failure  Pyoderma gangrenosum: Pyoderma gangrenosum  Depression, unspecified depression type: Depression, unspecified depression type  Medication management: Medication management  Gastroesophageal reflux disease, esophagitis presence not specified: Gastroesophageal reflux disease, esophagitis presence not specified  Aortic stenosis, severe: Aortic stenosis, severe  Coronary artery disease involving native coronary artery of native heart without angina pectoris: Coronary artery disease involving native coronary artery of native heart without angina pectoris  Altered mental status, unspecified altered mental status type: Altered mental status, unspecified altered mental status type  Cellulitis of leg, right: Cellulitis of leg, right

## 2019-03-19 NOTE — CHART NOTE - NSCHARTNOTEFT_GEN_A_CORE
Nutrition Follow Up Note  Patient seen for: LOS follow up     Interim events. Pt admitted c cellulitis, pt c multiple pressure injuries, bacteremia, iron deficiency anemia, YONATAN (noted Nephrology following- noted Cr rising and pt now c hyperkalemia, as per Nephrology, likely steroid related at this time and to be treated medically). Noted pt S/P RRT 3/18 for hypotension and hypoglycemia.  Noted plan for family meeting later today. Noted pt was made DNR/DNI recently.     Source: private aidherman Pal at bedside, pt confused, lethargic, PCA    Diet : pureed, no concentrated potassium, Ensure Enlive x 2 daily     As per private aide, pt c very poor PO intake, has been taking only a few spoon fulls at each meal of pureed foods and beverages and only accepts it when aide persistently encourages pt. Aide has been trying to get pt to take some Ensure Enlive and has mixed in Howard which was brought from home. Aide reports pt has taken some cream soups from outside. Reports pt does like mashed potatoes and cheese and wonders if pt would be willing to take some if it was made available to her. Noted pt c fecal impaction as per flow sheets, last BM 3/14.     Daily Weight in k/27: 117->3/1: 117->3/13: 116.5 kg, noted pt +4 generalized edema, likely c wt loss as well     Pertinent Medications: MEDICATIONS  (STANDING):  ascorbic acid 500 milliGRAM(s) Oral daily  aspirin enteric coated 81 milliGRAM(s) Oral daily  atorvastatin 10 milliGRAM(s) Oral at bedtime  bisacodyl 5 milliGRAM(s) Oral at bedtime  calcium carbonate 1250 mG  + Vitamin D (OsCal 500 + D) 1 Tablet(s) Oral three times a day  cholecalciferol 1000 Unit(s) Oral daily  cycloSPORINE  (SandIMMUNE)  Solution 75 milliGRAM(s) Oral two times a day  Dakins Solution - 1/4 Strength 1 Application(s) Topical two times a day  DAPTOmycin IVPB 700 milliGRAM(s) IV Intermittent every 48 hours  dextrose 5% + sodium chloride 0.9%. 1000 milliLiter(s) (50 mL/Hr) IV Continuous <Continuous>  escitalopram 5 milliGRAM(s) Oral daily  folic acid 1 milliGRAM(s) Oral daily  heparin  Injectable 5000 Unit(s) SubCutaneous every 8 hours  metoprolol tartrate 12.5 milliGRAM(s) Oral two times a day  nystatin    Suspension 404185 Unit(s) Swish and Swallow three times a day  polyethylene glycol 3350 17 Gram(s) Oral daily  vitamin B complex with vitamin C 1 Tablet(s) Oral daily    MEDICATIONS  (PRN):  acetaminophen   Tablet .. 650 milliGRAM(s) Oral every 6 hours PRN Mild Pain (1 - 3)  acetaminophen   Tablet .. 650 milliGRAM(s) Oral every 6 hours PRN Temp greater or equal to 38C (100.4F)  acetaminophen  IVPB .. 1000 milliGRAM(s) IV Intermittent once PRN Moderate Pain (4 - 6)  melatonin 3 milliGRAM(s) Oral at bedtime PRN Insomnia  oxyCODONE    5 mG/acetaminophen 325 mG 1 Tablet(s) Oral every 6 hours PRN Severe Pain (7 - 10)    Pertinent Labs:  @ 07:26: Na 143, BUN 68<H>, Cr 2.94<H>, BG 90, K+ 5.5<H>, Phos --, Mg --, Alk Phos --, ALT/SGPT --, AST/SGOT --, HbA1c --    Finger Sticks: None pertinent to address at this time.   POCT Blood Glucose.: 107 mg/dL ( @ 02:57)      Skin per nursing documentation: unstageable pressure injury x2 to hip and hip fold and suspected DTI to right  heel  Edema: +4 generalized edema     Estimated Needs:   [x] no change since previous assessment    Previous Nutrition Diagnosis: inadequate PO intake   Nutrition Diagnosis transitioned to diagnosis below    New Nutrition Diagnosis: malnutrition (severe, acute)  Related to: prolonged poor appetite, increased demand for nutrients   As evidenced by: +4 generalized edema, poor PO intake over last few weeks     Interventions: consider diet liberalization, supplements    Recommend  1) Consider diet liberalization (remove potassium restriction) since pt c poor PO intake, discussed c NP.  2) Consider Howard x 2 packets daily if consistent c pt/family wishes.  3) Continue w/ Ensure Enlive.     Monitoring and Evaluation:     Continue to monitor Nutritional intake, Tolerance to diet prescription, weights, labs, skin integrity    RD remains available upon request and will follow up per protocol  Heidi Cui, MS, RD, , [facility] #[id number]

## 2019-03-19 NOTE — CHART NOTE - NSCHARTNOTEFT_GEN_A_CORE
Both  and daughter called me last week on Friday, to discuss patient's status .  This was also discussed with ID two days ago, and Palliative today at bedside  Await family meeting conclusion

## 2019-03-19 NOTE — PROGRESS NOTE ADULT - NSICDXPROBLEM_GEN_ALL_CORE_FT
PROBLEM DIAGNOSES  Problem: Debility  Assessment and Plan:     Problem: Aortic stenosis, severe  Assessment and Plan:     Problem: Depression, unspecified depression type  Assessment and Plan:     Problem: History of pyoderma  Assessment and Plan:     Problem: Encephalopathy  Assessment and Plan:     Problem: Palliative care encounter  Assessment and Plan: PROBLEM DIAGNOSES  Problem: Encephalopathy  Assessment and Plan:     Problem: Bacteremia  Assessment and Plan:     Problem: Aortic stenosis, severe  Assessment and Plan:     Problem: Depression, unspecified depression type  Assessment and Plan:     Problem: Functional quadriplegia  Assessment and Plan: Requires total care    Problem: History of pyoderma  Assessment and Plan: Using Cyclosporin for     Problem: Palliative care encounter  Assessment and Plan: Family meeting at 3pm   is the surrogate

## 2019-03-19 NOTE — CHART NOTE - NSCHARTNOTEFT_GEN_A_CORE
[facility]  Late entry due to technical issues    Meeting   [place]  Daughter Betty Bhandari Son  Max Son Hearing impaired  Palliative Medicine [facility]    Introductions made   Inquired about the family's experience with palliative medicine  Offered contemporary and accurate description of palliative medicine.      There are varying targets within the family regarding goals      Thony: Is worried about her cognitive status and we reviewed her medical conditions.  He would like a clear assessment of what can be limiting her life since pyoderma is not a life limiting illness. We discussed the YONATAN, hypotension, and hypoglycemia.  Thony asked about TPN and an NG tube, and whether or not these would be used. He feels like his mother has limited QOL    Max: We had VRI Sign Language interpretation  He stormed out at one point due to sadness.  He asked if transfusions could help her and provide more energy.  He wants her to live longer and felt like things have been different since a ? emotionally traumatic event, perhaps involving a part (?engagement/shower?)    Betty:  She feels her mother is improving and may need to be given a chance. Betty wants her mother to eat and drink, and be given the opportunity to improve and go home.  Thony felt that this could not work and she would be not able to live like she used to and help on high holidays, lending itself to a sign of lost independence.  Betty feels like negative comments like "kidneys are failing" within earshot may contribute to an environment not conducive to healing and improvement.  I discussed aspects of the patient's encephalopathy and the impact of reduced cognitive load on her ability to discuss/understand high level discussions       Bradne: Although he feels like he would like his grandmother should go home but may be too sick to go home.  He had a host of questions from other family member and was hoping to assign a liaison within his family to receive all of the information.    Yonatan: Her  feels like she cannot go home ever again.  He feels the "aides are aides" and "not professionals" like in the hospital.  He admitted that the patient and his wife wanted them both to die.  He was frustrated because he feels like people are rough with her, echoed by other relatives. " I want her to live".  The father wanted to know why she has pyoderma and another family member suggested that there was no definitive diagnosis which is frustrating them.  One of the sons said that she "feels useless".  More than one family member stated that  she is not being ambulated enough to improve. He also complained about timeliness of medications (and we reviewed pharmacokinetic/pharmacodynamics IV versus po administration)  At the conclusion of the meeting, he was adamant about speaking to the nursing manager. Discussed with UR who would relay request when staff was available        Her care plan will continue as is for now.      Time spent ACP 60 min    Subsequently, I was called the grandson Tavares -he was not present at the meeting-who is requesting written documentation from providers during meetings and asserted that providers ought to synthesize emails/typewritten synopses of the meetings for the family to help foster understanding.  He also intimated due to generational and educational differences, family meetings may not be successful with the Kendell's. He made a statement about his family "we're not sophisticated" and requested that clear documentation written out explaining the treatment plan and thought processes behind the plan. I assured him that we deliver palatable, lucid, and jargon free language to help enhance the patient/caregiver experience and communicate effectively. To allay his concerns, I offered to have Tavares participate in the meetings given his extensive knowledge of his family, the nuance, and concerns.  Moreover, given his understanding of his self described family dynamics, he would be an asset by serving as a scribe to help catalog the things for his family.   He wanted to know how people can obtain access to  EMR content if in the hospital, more specifically written content about the patients care plan, and I spoke about the discharge process via medical records. He wanted my email contact for correspondence about the case, but I explained HIPPA and the video/audio recording policy associated with the institution and offered to put him in touch with security or administrative offices to review said policy.  I informed him that people have the opportunity to send confidential faxes if sensitive information needs to be disseminated to the medical team, his response was that "it's 2019!"  I spoke about the nature of faxes and pagers as contemporary vehicles for medical communication and the importance of secure patient information.  I tried to educate him on the role of palliative care in the hospital.  He was under the impression that there is an outpatient palliative team that will manage any outpatient needs of his grandmother.      Time Spent > 10 min          MOLST form in the chart, please fill out appropriately with accompanying capacity form.  Discussed with primary team NP    My impression is that the patient's surrogate and family have no interest in hospice at this time.    The patient is not a PCU candidate.      I suspect this will be protracted given a review of the EMR and the 's desire to no longer take the patient home  Unclear clinical course  Consider early engagement with CM/SW in the event that she   stabilizes and needs a discharge plan since the  stated that he will not bring her home [facility] Note  Late entry due to technical issues    Meeting   [place]  Daughter Betty Bhandari Son  Max Son Hearing impaired  Austin Feliciano  Palliative Medicine [facility]    Introductions made   Inquired about the family's experience with palliative medicine  Offered contemporary and accurate description of palliative medicine.      There are varying targets within the family regarding goals      Thony: Is worried about her cognitive status and we reviewed her medical conditions.  He would like a clear assessment of what can be limiting her life since pyoderma is not a life limiting illness. We discussed the YONATAN, hypotension, and hypoglycemia.  Thony asked about TPN and an NG tube, and whether or not these would be used. He feels like his mother has limited QOL    Max: We had VRI Sign Language interpretation  He stormed out at one point due to sadness.  He asked if transfusions could help her and provide more energy.  He wants her to live longer and felt like things have been different since a ? emotionally traumatic event, perhaps involving a part (?engagement/shower?)    Betty:  She feels her mother is improving and may need to be given a chance. Betty wants her mother to eat and drink, and be given the opportunity to improve and go home.  Thony felt that this could not work and she would be not able to live like she used to and help on high holidays, lending itself to a sign of lost independence.  Betty feels like negative comments like "kidneys are failing" within earshot may contribute to an environment not conducive to healing and improvement.  I discussed aspects of the patient's encephalopathy and the impact of reduced cognitive load on her ability to discuss/understand high level discussions       Braden: Although he feels like he would like his grandmother should go home but may be too sick to go home.  He had a host of questions from other family member and was hoping to assign a liaison within his family to receive all of the information.    Yonatan: Her  feels like she cannot go home ever again.  He feels the "aides are aides" and "not professionals" like in the hospital.  He admitted that the patient and his wife wanted them both to die.  He was frustrated because he feels like people are rough with her, echoed by other relatives. " I want her to live".  The father wanted to know why she has pyoderma and another family member suggested that there was no definitive diagnosis which is frustrating them.  One of the sons said that she "feels useless".  More than one family member stated that  she is not being ambulated enough to improve. He also complained about timeliness of medications (and we reviewed pharmacokinetic/pharmacodynamics IV versus po administration)  At the conclusion of the meeting, he was adamant about speaking to the nursing manager. Discussed with UR who would relay request when staff was available        Her care plan will continue as is for now.      Time spent ACP 60 min    Subsequently, I was called the grandson Tavares -he was not present at the meeting-who is requesting written documentation from providers during meetings and asserted that providers ought to synthesize emails/typewritten synopses of the meetings for the family to help foster understanding.  He also intimated due to generational and educational differences, family meetings may not be successful with the Kendell's. He made a statement about his family "we're not sophisticated" and requested that clear documentation written out explaining the treatment plan and thought processes behind the plan. I assured him that we deliver palatable, lucid, and jargon free language to help enhance the patient/caregiver experience and communicate effectively. To allay his concerns, I offered to have Tavares participate in the meetings given his extensive knowledge of his family, the nuance, and concerns.  Moreover, given his understanding of his self described family dynamics, he would be an asset by serving as a scribe to help catalog the things for his family.   He wanted to know how people can obtain access to  EMR content if in the hospital, more specifically written content about the patients care plan, and I spoke about the discharge process via medical records. He wanted my email contact for correspondence about the case, but I explained HIPPA and the video/audio recording policy associated with the institution and offered to put him in touch with security or administrative offices to review said policy.  I informed him that people have the opportunity to send confidential faxes if sensitive information needs to be disseminated to the medical team, his response was that "it's 2019!"  I spoke about the nature of faxes and pagers as contemporary vehicles for medical communication and the importance of secure patient information.  I tried to educate him on the role of palliative care in the hospital.  He was under the impression that there is an outpatient palliative team that will manage any outpatient needs of his grandmother.      Time Spent > 10 min          MOLST form in the chart, please fill out appropriately with accompanying capacity form.  Discussed with primary team NP    My impression is that the patient's surrogate and family have no interest in hospice at this time.    The patient is not a PCU candidate.      I suspect this will be protracted given a review of the EMR and the 's desire to no longer take the patient home  Unclear clinical course  Consider early engagement with CM/SW in the event that she   stabilizes and needs a discharge plan since the  stated that he will not bring her home

## 2019-03-19 NOTE — PROGRESS NOTE ADULT - ATTENDING COMMENTS
Problem: Encephalopathy: Multifactorial. Lethargic.   Persistent  Minimal po intake.  Minimal engagement     Problem Bacteremia: LT abx. Plan is for several weeks of treatment. ? PPM/Valve source, unclear  Abx by primary team      Problem Knee replacement. Hx of substantial complications. 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

## 2019-03-20 LAB
ANION GAP SERPL CALC-SCNC: 12 MMOL/L — SIGNIFICANT CHANGE UP (ref 5–17)
ANION GAP SERPL CALC-SCNC: 14 MMOL/L — SIGNIFICANT CHANGE UP (ref 5–17)
BUN SERPL-MCNC: 71 MG/DL — HIGH (ref 7–23)
BUN SERPL-MCNC: 72 MG/DL — HIGH (ref 7–23)
CALCIUM SERPL-MCNC: 6.9 MG/DL — LOW (ref 8.4–10.5)
CALCIUM SERPL-MCNC: 7.6 MG/DL — LOW (ref 8.4–10.5)
CHLORIDE SERPL-SCNC: 108 MMOL/L — SIGNIFICANT CHANGE UP (ref 96–108)
CHLORIDE SERPL-SCNC: 109 MMOL/L — HIGH (ref 96–108)
CO2 SERPL-SCNC: 20 MMOL/L — LOW (ref 22–31)
CO2 SERPL-SCNC: 21 MMOL/L — LOW (ref 22–31)
CREAT SERPL-MCNC: 2.79 MG/DL — HIGH (ref 0.5–1.3)
CREAT SERPL-MCNC: 2.81 MG/DL — HIGH (ref 0.5–1.3)
GLUCOSE BLDC GLUCOMTR-MCNC: 111 MG/DL — HIGH (ref 70–99)
GLUCOSE BLDC GLUCOMTR-MCNC: 187 MG/DL — HIGH (ref 70–99)
GLUCOSE SERPL-MCNC: 105 MG/DL — HIGH (ref 70–99)
GLUCOSE SERPL-MCNC: 108 MG/DL — HIGH (ref 70–99)
HCT VFR BLD CALC: 28.9 % — LOW (ref 34.5–45)
HGB BLD-MCNC: 8.3 G/DL — LOW (ref 11.5–15.5)
MCHC RBC-ENTMCNC: 26.9 PG — LOW (ref 27–34)
MCHC RBC-ENTMCNC: 28.7 GM/DL — LOW (ref 32–36)
MCV RBC AUTO: 93.5 FL — SIGNIFICANT CHANGE UP (ref 80–100)
NRBC # BLD: 2 /100 WBCS — HIGH (ref 0–0)
PLATELET # BLD AUTO: 214 K/UL — SIGNIFICANT CHANGE UP (ref 150–400)
POTASSIUM SERPL-MCNC: 5.2 MMOL/L — SIGNIFICANT CHANGE UP (ref 3.5–5.3)
POTASSIUM SERPL-MCNC: 5.8 MMOL/L — HIGH (ref 3.5–5.3)
POTASSIUM SERPL-SCNC: 5.2 MMOL/L — SIGNIFICANT CHANGE UP (ref 3.5–5.3)
POTASSIUM SERPL-SCNC: 5.8 MMOL/L — HIGH (ref 3.5–5.3)
RBC # BLD: 3.09 M/UL — LOW (ref 3.8–5.2)
RBC # FLD: 23.2 % — HIGH (ref 10.3–14.5)
SODIUM SERPL-SCNC: 141 MMOL/L — SIGNIFICANT CHANGE UP (ref 135–145)
SODIUM SERPL-SCNC: 143 MMOL/L — SIGNIFICANT CHANGE UP (ref 135–145)
WBC # BLD: 29.15 K/UL — HIGH (ref 3.8–10.5)
WBC # FLD AUTO: 29.15 K/UL — HIGH (ref 3.8–10.5)

## 2019-03-20 PROCEDURE — 99233 SBSQ HOSP IP/OBS HIGH 50: CPT | Mod: GC

## 2019-03-20 RX ORDER — ALBUTEROL 90 UG/1
2.5 AEROSOL, METERED ORAL ONCE
Qty: 0 | Refills: 0 | Status: COMPLETED | OUTPATIENT
Start: 2019-03-20 | End: 2019-03-20

## 2019-03-20 RX ORDER — DEXTROSE 50 % IN WATER 50 %
50 SYRINGE (ML) INTRAVENOUS ONCE
Qty: 0 | Refills: 0 | Status: COMPLETED | OUTPATIENT
Start: 2019-03-20 | End: 2019-03-20

## 2019-03-20 RX ORDER — FUROSEMIDE 40 MG
20 TABLET ORAL ONCE
Qty: 0 | Refills: 0 | Status: COMPLETED | OUTPATIENT
Start: 2019-03-20 | End: 2019-03-20

## 2019-03-20 RX ORDER — INSULIN HUMAN 100 [IU]/ML
10 INJECTION, SOLUTION SUBCUTANEOUS ONCE
Qty: 0 | Refills: 0 | Status: COMPLETED | OUTPATIENT
Start: 2019-03-20 | End: 2019-03-20

## 2019-03-20 RX ORDER — SODIUM CHLORIDE 9 MG/ML
1000 INJECTION, SOLUTION INTRAVENOUS
Qty: 0 | Refills: 0 | Status: DISCONTINUED | OUTPATIENT
Start: 2019-03-20 | End: 2019-03-25

## 2019-03-20 RX ADMIN — ATORVASTATIN CALCIUM 10 MILLIGRAM(S): 80 TABLET, FILM COATED ORAL at 21:30

## 2019-03-20 RX ADMIN — SODIUM CHLORIDE 60 MILLILITER(S): 9 INJECTION, SOLUTION INTRAVENOUS at 12:58

## 2019-03-20 RX ADMIN — INSULIN HUMAN 10 UNIT(S): 100 INJECTION, SOLUTION SUBCUTANEOUS at 18:58

## 2019-03-20 RX ADMIN — Medication 81 MILLIGRAM(S): at 11:00

## 2019-03-20 RX ADMIN — Medication 500000 UNIT(S): at 21:30

## 2019-03-20 RX ADMIN — Medication 1 TABLET(S): at 12:57

## 2019-03-20 RX ADMIN — OXYCODONE AND ACETAMINOPHEN 1 TABLET(S): 5; 325 TABLET ORAL at 16:35

## 2019-03-20 RX ADMIN — Medication 5 MILLIGRAM(S): at 21:31

## 2019-03-20 RX ADMIN — OXYCODONE AND ACETAMINOPHEN 1 TABLET(S): 5; 325 TABLET ORAL at 06:54

## 2019-03-20 RX ADMIN — Medication 1 TABLET(S): at 21:31

## 2019-03-20 RX ADMIN — OXYCODONE AND ACETAMINOPHEN 1 TABLET(S): 5; 325 TABLET ORAL at 15:38

## 2019-03-20 RX ADMIN — OXYCODONE AND ACETAMINOPHEN 1 TABLET(S): 5; 325 TABLET ORAL at 07:25

## 2019-03-20 RX ADMIN — DAPTOMYCIN 128 MILLIGRAM(S): 500 INJECTION, POWDER, LYOPHILIZED, FOR SOLUTION INTRAVENOUS at 06:56

## 2019-03-20 RX ADMIN — Medication 50 MILLILITER(S): at 18:57

## 2019-03-20 RX ADMIN — CYCLOSPORINE 75 MILLIGRAM(S): 100 CAPSULE ORAL at 11:00

## 2019-03-20 RX ADMIN — Medication 1000 UNIT(S): at 11:00

## 2019-03-20 RX ADMIN — ALBUTEROL 2.5 MILLIGRAM(S): 90 AEROSOL, METERED ORAL at 21:30

## 2019-03-20 RX ADMIN — CYCLOSPORINE 75 MILLIGRAM(S): 100 CAPSULE ORAL at 21:33

## 2019-03-20 RX ADMIN — POLYETHYLENE GLYCOL 3350 17 GRAM(S): 17 POWDER, FOR SOLUTION ORAL at 11:01

## 2019-03-20 RX ADMIN — ESCITALOPRAM OXALATE 5 MILLIGRAM(S): 10 TABLET, FILM COATED ORAL at 12:58

## 2019-03-20 RX ADMIN — SODIUM CHLORIDE 75 MILLILITER(S): 9 INJECTION, SOLUTION INTRAVENOUS at 18:57

## 2019-03-20 RX ADMIN — Medication 1 MILLIGRAM(S): at 11:01

## 2019-03-20 RX ADMIN — Medication 500 MILLIGRAM(S): at 11:00

## 2019-03-20 RX ADMIN — Medication 1 TABLET(S): at 12:58

## 2019-03-20 RX ADMIN — HEPARIN SODIUM 5000 UNIT(S): 5000 INJECTION INTRAVENOUS; SUBCUTANEOUS at 12:59

## 2019-03-20 RX ADMIN — Medication 20 MILLIGRAM(S): at 18:56

## 2019-03-20 RX ADMIN — Medication 50 MILLILITER(S): at 18:56

## 2019-03-20 RX ADMIN — HEPARIN SODIUM 5000 UNIT(S): 5000 INJECTION INTRAVENOUS; SUBCUTANEOUS at 06:59

## 2019-03-20 RX ADMIN — HEPARIN SODIUM 5000 UNIT(S): 5000 INJECTION INTRAVENOUS; SUBCUTANEOUS at 21:31

## 2019-03-20 RX ADMIN — Medication 10 MILLIGRAM(S): at 21:31

## 2019-03-20 RX ADMIN — Medication 1 APPLICATION(S): at 18:58

## 2019-03-20 NOTE — CONSULT NOTE ADULT - ASSESSMENT
# Ulcerations  Previously improved on PO pred + cyclosporine, treating as presumed pyoderma gangrenosum. Retiform nature of borders suggestive of vasculopathy - including purpura fulminans/ DIC - however platelets and coags remain stable.   - appreciate wound service for dressing changes  - would continue cyclosporine 75mg BID adjusted for Cr and levels  - daptomycin per ID # Ulcerations  Treating as presumed pyoderma gangrenosum  Previously improved on PO pred + cyclosporine    Exam was limited, but pt appears to have presence of retiform purpura on post legs and perhaps around ulcer edges.   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 Would remain watchful for DIC.    - appreciate wound service for dressing changes  - may continue cyclosporine 75mg BID adjusted for Cr and levels  - daptomycin per ID

## 2019-03-20 NOTE — PROGRESS NOTE ADULT - ASSESSMENT
77 yo F hx PMR, Pyoderma Gangrenosum (prior steroids, now on cyclosporine), s/p TAVR and explant of infected R Knee for MRSA (doxy suppression, spacer) admitted with fever to 102 and leukocytosis with bacteremia. Renal following for YONATAN on CKD4    #  YONATAN on CKD4- worsening renal function, poor oral intake, encouraged to increase PO intake, labs reviewed and still consistent with pre-renal. Please increase D5 NS to 75cc/hr  # Hyperkalemia: D50 2 amps, Insulin, albuterol, and another dose of lasix 20mg IV once  # HTN BP low-consider dec BB dose or d/c  # cellulitis- abxs, pain control per medicine    Full code per family/proxy, poor prognosis in general    labs, chart reviewed

## 2019-03-20 NOTE — PROGRESS NOTE ADULT - ASSESSMENT
Patient with chronic right knee spacer infection, relapse of CoNS bacteremia, likely infected TAVR and/or PPM- on extended abx  Pyoderma gangrenosa with extensive necrotic tissue over her hips, buttocks and thighs, no surgical intervention  Steroids and cycloserine  Still approaching medical futility in general  Pt has been bedbound for months  Poor po intake, anasarca, diminished urine output, hypotension, renal dysfx    Plan:  Continue Dapto to q 48h- another week of Tx  No objection repeating Bld Cxs, but palliative approach remains most rational  D/w NP at bedside  D/w aide

## 2019-03-20 NOTE — PROGRESS NOTE ADULT - ASSESSMENT
· Assessment		  78 F PMH of CAD s/p stent to LAD, AS s/p TAVR, PPM, DVT (Jan '18) on coumadin, IVC filter, pyoderma gangrenosum, hx MRSA infections, and total knee replacement c/b joint infections s/p I&D explantation and spacer placement followed by spacer removal and static spacer placement with joint fusion due to infection of initial spacer with complex wound closure by plastic surgery, admission here in November 2018 for hip and thigh wounds, now presenting with confusion and chills at home associated with poor appetite and confusion x 1 week and not eating x 2 days. Noted with fever here, YONATAN on labwork. R knee evaluated by orthopedic surgery.	    Problem/Plan - 1:  ·  Problem: Bacteremia.  Plan: 2' S epidermidis  Daptomycin last day is 3/28/19; PICC placed 3/6/19  blood cultures from 2/28, 3/9 are negative.  ID f/u appreciated  TTE without discrete vegetation, too high-risk for ERIKA and would not   CT chest 2/11 revealing b/l pleural effusions  Poor prognosis overall. Palliative on the case.  There is no consensus among the family members towards embracing hospice care at this point      Problem/Plan - 2:  ·  Problem: Iron deficiency anemia.  Plan: in setting of CK3  s/p venofer 200 mg on 3/7 and 3/8  s/p 1 unit of pRBC on 3/9 and likely another one on 3/15  hgb stable    Problem/Plan - 3:  ·  Problem: Pyoderma gangrenosum.  Plan: c/w yclosporine dose adjusted to 100 mg BID based on levels  completed chronic steroids taper for pyoderma, (she was on Prednisone 5 mg qdaily, tapered off on admission)  Per orthopedic surgery can be OOB primarily to wheelchair and can 50% weight  wound care consult and wound vac management appreciated.     Problem/Plan - 4:  ·  Problem: Altered mental status, unspecified altered mental status type.  Plan: likely 2/2 metabolic encephalopathy given fever and concern for cellulitis  mental status stable  Patient also on narcotics but stable on current regimen.   c/w oxycodone to q6hr PRN.  Per the son, she gets q4hr sometime at home if pain is severe.   she is on home gabapentin 300 mg BID, neurontin tapered down to 100 mg bid and she is completely off for now due to lethargy.     Problem/Plan - 5:  ·  Problem: Acute kidney failure.  Plan: likely cardiorenal. intravascular depleted but grossly volume overloaded.   poor PO intake.   renal appreciated  Holding off diuretics for now  going back and forth with intravascular depletion s/p IVF vs. gross extravascular volume overload  guarded prognosis.     Problem/Plan - 6:  Problem: Coronary artery disease involving native coronary artery of native heart without angina pectoris. Plan: Cont Atovastatin 10 mg QHS.    Problem/Plan - 7:  ·  Problem: Aortic stenosis, severe.  Plan: Pt with severe AS, not candidate for intervention indefinitely    Problem/Plan - 8:  ·  Problem: Gastroesophageal reflux disease, esophagitis presence not specified.  Plan: Protonix.     Problem/Plan - 9:  ·  Problem: Depression, unspecified depression type.    c/w low dose Lexapro 5 mg.     Problem/Plan - 10:  Problem: Need for prophylactic measure. Plan; SC heparin 5000 units q8h.    Problem/Plan - 11:  ·  Problem: poor prognosis. Dr. Palumbo had numerous discussions with different family members.  MOLST forms sign. the  [place] agreed to DNR/DNI, verbal consent.   The son Thony agrees with conservative approach. Palliative family meeting held 3/19/19.  Appreciate palliative intervention.  See their extensive note detailing the meeting.  Family not agreeing to hospice at this point.

## 2019-03-20 NOTE — PROGRESS NOTE ADULT - SUBJECTIVE AND OBJECTIVE BOX
Doesn't always answer my simple ?s  A bit lethargic and/or confused at times    Vital Signs Last 24 Hrs  T(C): 36.7 (20 Mar 2019 11:09), Max: 36.7 (19 Mar 2019 21:44)  T(F): 98 (20 Mar 2019 11:09), Max: 98.1 (19 Mar 2019 21:44)  HR: 77 (20 Mar 2019 11:09) (74 - 89)  BP: 108/56 (20 Mar 2019 11:10) (91/60 - 113/57)  BP(mean): --  RR: 18 (20 Mar 2019 11:09) (18 - 18)  SpO2: 97% (20 Mar 2019 11:09) (96% - 97%)    GENERAL: lying in bed, confused at times, morbidly obese  HEAD:  Atraumatic, Normocephalic  EYES: EOMI, PERRLA, conjunctiva and sclera clear  NECK: Supple, No JVD  CHEST/LUNG: mild decrease breath sounds bilaterally; No wheeze   HEART: Regular rate and rhythm; No murmurs, rubs, or gallops  ABDOMEN: Soft, Nontender, Nondistended; Bowel sounds present  BACK: multiple wounds/pressure ulcers all over her entire back   Neuro: awake alert   EXTREMITIES:  2+ Peripheral Pulses, No clubbing, cyanosis.  2+ nonpitting edema LE, UE trace edema stable  Back: sacral and thigh wound. +wound vac in place  SKIN: No rashes or lesions    LABS:                        8.3    29.15 )-----------( 214      ( 20 Mar 2019 09:12 )             28.9     03-20    141  |  109<H>  |  71<H>  ----------------------------<  105<H>  5.8<H>   |  20<L>  |  2.81<H>    Ca    6.9<L>      20 Mar 2019 09:17        CAPILLARY BLOOD GLUCOSE      POCT Blood Glucose.: 111 mg/dL (20 Mar 2019 03:06)  POCT Blood Glucose.: 119 mg/dL (19 Mar 2019 18:24)

## 2019-03-20 NOTE — PROGRESS NOTE ADULT - SUBJECTIVE AND OBJECTIVE BOX
CC: f/u for CoNS bacteremia    Patient more lethargic, poor po intake, more edema, poor urine output; hypotensive    REVIEW OF SYSTEMS:  All other review of systems negative (Comprehensive ROS)- limited, d/w aide at bedside    Antimicrobials Day # 35  Medications Reviewed    Vital Signs Last 24 Hrs  T(F): 98 (20 Mar 2019 11:09), Max: 98.1 (19 Mar 2019 21:44)  HR: 77 (20 Mar 2019 11:09) (74 - 89)  BP: 108/56 (20 Mar 2019 11:10) (91/60 - 113/57)  BP(mean): --  RR: 18 (20 Mar 2019 11:09) (18 - 18)  SpO2: 97% (20 Mar 2019 11:09) (96% - 97%)    PHYSICAL EXAM:  General: lethargic  Eyes:  anicteric, no conjunctival injection, no discharge  Oropharynx: no lesions or injection 	  Neck: supple, without adenopathy  Lungs: course  to auscultation  Heart: regular rate and rhythm; no murmur, rubs or gallops  Abdomen: soft, nondistended, nontender  Skin: no generalized rash  Extremities: R PICC site clean, dependent edema  Neurologic: lethargic, slow to arouse, mumbling    LAB RESULTS:                        8.3    29.15 )-----------( 214      ( 20 Mar 2019 09:12 )             28.9   03-20    141  |  109<H>  |  71<H>  ----------------------------<  105<H>  5.8<H>   |  20<L>  |  2.81<H>    Ca    6.9<L>      20 Mar 2019 09:17    Urine Microscopic-Add On (NC) (03.13.19 @ 21:41)    Epithelial Cells: >27 /HPF    Bacteria: Moderate    Hyaline Casts: 0 /LPF    White Blood Cell - Urine: 22 /HPF    Red Blood Cell - Urine: 8 /HPF    MICROBIOLOGY:  RECENT CULTURES:  Culture - Blood (03.09.19 @ 16:44)    Specimen Source: .Blood Blood-Venous    Culture Results:   No growth at 5 days.      RADIOLOGY REVIEWED:  CT Chest No Cont (03.10.19 @ 20:01) >  1.  There are bilateral pleural effusions.  2.  Passive atelectasis from bilateral pleural effusions.

## 2019-03-20 NOTE — PROGRESS NOTE ADULT - SUBJECTIVE AND OBJECTIVE BOX
[facility] - KIRTI Castillo / KIRTI Gracia / RITA Daniels/ KIRTI Zamudio/ KIRTI Irizarry/ LOLLY Hays / CLINT Forbes / LEBRON Boss  ---------------------------------------------------------------------------------------------------------------  seen and examined today for YONATAN  Interval : worsening serum creatinine   VITALS:  T(F): 98.1 (03-19-19 @ 21:44), Max: 98.1 (03-19-19 @ 21:44)  HR: 74 (03-20-19 @ 06:03)  BP: 102/68 (03-20-19 @ 06:03)  RR: 18 (03-19-19 @ 21:44)  SpO2: 96% (03-19-19 @ 21:44)  Wt(kg): --    03-19 @ 07:01  -  03-20 @ 07:00  --------------------------------------------------------  IN: 260 mL / OUT: 150 mL / NET: 110 mL      Physical Exam :-  Constitutional: NAD  Neck: Supple.  Respiratory: Bilateral equal breath sounds,  Cardiovascular: S1, S2 normal,  Gastrointestinal: Bowel Sounds present, soft, non tender.  Extremities: Anasarca  Neurological: confused, AAOx1  Psychiatric: Normal mood, normal affect  Data:-  Allergies :   amoxicillin (Other)  IV Contrast (Flushing (Skin); Nausea)    Hospital Medications:   MEDICATIONS  (STANDING):  ascorbic acid 500 milliGRAM(s) Oral daily  aspirin enteric coated 81 milliGRAM(s) Oral daily  atorvastatin 10 milliGRAM(s) Oral at bedtime  bisacodyl 5 milliGRAM(s) Oral at bedtime  calcium carbonate 1250 mG  + Vitamin D (OsCal 500 + D) 1 Tablet(s) Oral three times a day  cholecalciferol 1000 Unit(s) Oral daily  cycloSPORINE  (SandIMMUNE)  Solution 75 milliGRAM(s) Oral two times a day  Dakins Solution - 1/4 Strength 1 Application(s) Topical two times a day  DAPTOmycin IVPB 700 milliGRAM(s) IV Intermittent every 48 hours  dextrose 5%. 1000 milliLiter(s) (60 mL/Hr) IV Continuous <Continuous>  escitalopram 5 milliGRAM(s) Oral daily  folic acid 1 milliGRAM(s) Oral daily  heparin  Injectable 5000 Unit(s) SubCutaneous every 8 hours  metoprolol tartrate 12.5 milliGRAM(s) Oral two times a day  nystatin    Suspension 861335 Unit(s) Swish and Swallow three times a day  polyethylene glycol 3350 17 Gram(s) Oral daily  vitamin B complex with vitamin C 1 Tablet(s) Oral daily    03-20    141  |  109<H>  |  71<H>  ----------------------------<  105<H>  5.8<H>   |  20<L>  |  2.81<H>    Ca    6.9<L>      20 Mar 2019 09:17      Creatinine Trend: 2.81 <--, 2.79 <--, 2.94 <--, 2.49 <--, 2.49 <--, 2.23 <--, 2.00 <--, 2.11 <--                        9.0    35.48 )-----------( 243      ( 19 Mar 2019 11:27 )             31.4

## 2019-03-20 NOTE — CONSULT NOTE ADULT - SUBJECTIVE AND OBJECTIVE BOX
HPI:  77 y/o F h/o CAD s/p PCI, AS s/p TAVR, PPM, DVT previously on warfarin s/p IVC filter, PG, and TKR c/b joint infections admitted in 2/2019 for AMS and fevers. Derm consulted for ulcerations. Chronic, well-known to Derm service. Previously biopsied with results non-specific (ischemia and hemorrhage). Was on a course of PO pred and cyclosporine with near complete healing of ulcerations, but subsequently re-opened per home aide. Very painful. Coagulopathy w/u in past unrevealing. Labs now with WBC 29, plt 214, INR 2.27, PTT 47.7, Cr 2.81. BCx 2/2 with Staph epidermidis, now on daptomycin. Currently undergoing GOC discussions with family and Palliative care.      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  Artificial cardiac pacemaker: 12/25/17  H/O cardiac catheterization: 11/29/16 HERBERT placed on LAD  H/O endoscopy: with dilatation of esophageal stricture 2013  S/P cataract surgery: x2; 3-4yr ago  S/P gastroplasty: 28 yrs ago  S/P cholecystectomy: more than 15 years ago  S/P total knee replacement: left, 15yr ago  S/P total knee replacement: right, 12yr ago  S/P hysterectomy: 24yr ago      REVIEW OF SYSTEMS    General: no fevers/chills, no lethargy	    Skin/Breast: see HPI  	  Ophthalmologic: no eye pain or change in vision  	  ENMT: no dysphagia or change in hearing    Respiratory and Thorax: no SOB or cough  	  Cardiovascular: no palpitations or chest pain    Gastrointestinal: no abdominal pain or blood in stool     Genitourinary: no dysuria or frequency    Musculoskeletal: no joint pains    Neurological: no weakness, numbness , or tingling    MEDICATIONS  (STANDING):  ALBUTerol    0.083%. 2.5 milliGRAM(s) Nebulizer once  ascorbic acid 500 milliGRAM(s) Oral daily  aspirin enteric coated 81 milliGRAM(s) Oral daily  atorvastatin 10 milliGRAM(s) Oral at bedtime  bisacodyl 5 milliGRAM(s) Oral at bedtime  bisacodyl Suppository 10 milliGRAM(s) Rectal once  calcium carbonate 1250 mG  + Vitamin D (OsCal 500 + D) 1 Tablet(s) Oral three times a day  cholecalciferol 1000 Unit(s) Oral daily  cycloSPORINE  (SandIMMUNE)  Solution 75 milliGRAM(s) Oral two times a day  Dakins Solution - 1/4 Strength 1 Application(s) Topical two times a day  DAPTOmycin IVPB 700 milliGRAM(s) IV Intermittent every 48 hours  dextrose 5% + sodium chloride 0.9%. 1000 milliLiter(s) (75 mL/Hr) IV Continuous <Continuous>  escitalopram 5 milliGRAM(s) Oral daily  folic acid 1 milliGRAM(s) Oral daily  heparin  Injectable 5000 Unit(s) SubCutaneous every 8 hours  metoprolol tartrate 12.5 milliGRAM(s) Oral two times a day  nystatin    Suspension [id number] Unit(s) Swish and Swallow three times a day  polyethylene glycol 3350 17 Gram(s) Oral daily  vitamin B complex with vitamin C 1 Tablet(s) Oral daily    MEDICATIONS  (PRN):  acetaminophen   Tablet .. 650 milliGRAM(s) Oral every 6 hours PRN Mild Pain (1 - 3)  acetaminophen   Tablet .. 650 milliGRAM(s) Oral every 6 hours PRN Temp greater or equal to 38C (100.4F)  melatonin 3 milliGRAM(s) Oral at bedtime PRN Insomnia  oxyCODONE    5 mG/acetaminophen 325 mG 1 Tablet(s) Oral every 6 hours PRN Severe Pain (7 - 10)      Allergies    amoxicillin (Other)    Intolerances    IV Contrast (Flushing (Skin); Nausea)      SOCIAL HISTORY:    FAMILY HISTORY:  No pertinent family history in first degree relatives      Vital Signs Last 24 Hrs  T(C): 37.2 (20 Mar 2019 18:00), Max: 37.2 (20 Mar 2019 18:00)  T(F): 99 (20 Mar 2019 18:00), Max: 99 (20 Mar 2019 18:00)  HR: 90 (20 Mar 2019 18:00) (74 - 90)  BP: 98/60 (20 Mar 2019 18:00) (91/60 - 108/56)  BP(mean): --  RR: 18 (20 Mar 2019 18:00) (18 - 18)  SpO2: 97% (20 Mar 2019 18:00) (96% - 97%)    PHYSICAL EXAM:     The patient was alert and oriented X 3, well nourished, and moaning  OP showed no ulcerations  There was no visible lymphadenopathy.  Conjunctiva were non injected  There was no clubbing or edema of extremities.  The scalp, hair, face, eyebrows, lips, OP, neck, chest, back,   extremities X 4, nails were examined.  There was no hyperhidrosis or bromhidrosis.    Of note on skin exam:   - multiple large, deep, sharply demarcated ulcers of the abdomen, thighs, buttocks, with overlying necrotic material. Retiform appearance noted at ulcer edges and on posterior legs.   - appearance similar w/ ulcers pt has had previously    LABS:                        8.3    29.15 )-----------( 214      ( 20 Mar 2019 09:12 )             28.9     03-20    141  |  109<H>  |  71<H>  ----------------------------<  105<H>  5.8<H>   |  20<L>  |  2.81<H>    Ca    6.9<L>      20 Mar 2019 09:17            RADIOLOGY & ADDITIONAL STUDIES:

## 2019-03-21 LAB
ANION GAP SERPL CALC-SCNC: 15 MMOL/L — SIGNIFICANT CHANGE UP (ref 5–17)
BUN SERPL-MCNC: 73 MG/DL — HIGH (ref 7–23)
CALCIUM SERPL-MCNC: 7.9 MG/DL — LOW (ref 8.4–10.5)
CHLORIDE SERPL-SCNC: 107 MMOL/L — SIGNIFICANT CHANGE UP (ref 96–108)
CO2 SERPL-SCNC: 20 MMOL/L — LOW (ref 22–31)
CREAT SERPL-MCNC: 2.33 MG/DL — HIGH (ref 0.5–1.3)
CYCLOSPORINE SER-MCNC: 157 NG/ML — SIGNIFICANT CHANGE UP (ref 150–400)
GLUCOSE SERPL-MCNC: 68 MG/DL — LOW (ref 70–99)
HCT VFR BLD CALC: 28.5 % — LOW (ref 34.5–45)
HGB BLD-MCNC: 8.3 G/DL — LOW (ref 11.5–15.5)
MCHC RBC-ENTMCNC: 26.8 PG — LOW (ref 27–34)
MCHC RBC-ENTMCNC: 29.1 GM/DL — LOW (ref 32–36)
MCV RBC AUTO: 91.9 FL — SIGNIFICANT CHANGE UP (ref 80–100)
NRBC # BLD: 2 /100 WBCS — HIGH (ref 0–0)
PLATELET # BLD AUTO: 212 K/UL — SIGNIFICANT CHANGE UP (ref 150–400)
POTASSIUM SERPL-MCNC: 4.8 MMOL/L — SIGNIFICANT CHANGE UP (ref 3.5–5.3)
POTASSIUM SERPL-SCNC: 4.8 MMOL/L — SIGNIFICANT CHANGE UP (ref 3.5–5.3)
RBC # BLD: 3.1 M/UL — LOW (ref 3.8–5.2)
RBC # FLD: 23.8 % — HIGH (ref 10.3–14.5)
SODIUM SERPL-SCNC: 142 MMOL/L — SIGNIFICANT CHANGE UP (ref 135–145)
SURGICAL PATHOLOGY STUDY: SIGNIFICANT CHANGE UP
WBC # BLD: 29.73 K/UL — HIGH (ref 3.8–10.5)
WBC # FLD AUTO: 29.73 K/UL — HIGH (ref 3.8–10.5)

## 2019-03-21 PROCEDURE — 99232 SBSQ HOSP IP/OBS MODERATE 35: CPT

## 2019-03-21 PROCEDURE — 99233 SBSQ HOSP IP/OBS HIGH 50: CPT

## 2019-03-21 RX ORDER — FUROSEMIDE 40 MG
20 TABLET ORAL ONCE
Qty: 0 | Refills: 0 | Status: COMPLETED | OUTPATIENT
Start: 2019-03-21 | End: 2019-03-21

## 2019-03-21 RX ORDER — DRONABINOL 2.5 MG
2.5 CAPSULE ORAL
Qty: 0 | Refills: 0 | Status: DISCONTINUED | OUTPATIENT
Start: 2019-03-21 | End: 2019-03-25

## 2019-03-21 RX ADMIN — Medication 5 MILLIGRAM(S): at 21:41

## 2019-03-21 RX ADMIN — Medication 1 TABLET(S): at 21:41

## 2019-03-21 RX ADMIN — HEPARIN SODIUM 5000 UNIT(S): 5000 INJECTION INTRAVENOUS; SUBCUTANEOUS at 05:49

## 2019-03-21 RX ADMIN — Medication 1 APPLICATION(S): at 05:49

## 2019-03-21 RX ADMIN — Medication 81 MILLIGRAM(S): at 14:57

## 2019-03-21 RX ADMIN — Medication 1 TABLET(S): at 05:49

## 2019-03-21 RX ADMIN — CYCLOSPORINE 75 MILLIGRAM(S): 100 CAPSULE ORAL at 21:41

## 2019-03-21 RX ADMIN — Medication 1 TABLET(S): at 14:58

## 2019-03-21 RX ADMIN — Medication 500000 UNIT(S): at 05:49

## 2019-03-21 RX ADMIN — CYCLOSPORINE 75 MILLIGRAM(S): 100 CAPSULE ORAL at 10:37

## 2019-03-21 RX ADMIN — ATORVASTATIN CALCIUM 10 MILLIGRAM(S): 80 TABLET, FILM COATED ORAL at 21:41

## 2019-03-21 RX ADMIN — Medication 500000 UNIT(S): at 14:58

## 2019-03-21 RX ADMIN — HEPARIN SODIUM 5000 UNIT(S): 5000 INJECTION INTRAVENOUS; SUBCUTANEOUS at 14:58

## 2019-03-21 RX ADMIN — Medication 1000 UNIT(S): at 14:57

## 2019-03-21 RX ADMIN — HEPARIN SODIUM 5000 UNIT(S): 5000 INJECTION INTRAVENOUS; SUBCUTANEOUS at 21:41

## 2019-03-21 RX ADMIN — OXYCODONE AND ACETAMINOPHEN 1 TABLET(S): 5; 325 TABLET ORAL at 05:49

## 2019-03-21 RX ADMIN — Medication 500000 UNIT(S): at 21:41

## 2019-03-21 RX ADMIN — Medication 1 TABLET(S): at 14:57

## 2019-03-21 RX ADMIN — ESCITALOPRAM OXALATE 5 MILLIGRAM(S): 10 TABLET, FILM COATED ORAL at 14:58

## 2019-03-21 RX ADMIN — Medication 500 MILLIGRAM(S): at 14:58

## 2019-03-21 RX ADMIN — Medication 1 MILLIGRAM(S): at 14:57

## 2019-03-21 RX ADMIN — SODIUM CHLORIDE 75 MILLILITER(S): 9 INJECTION, SOLUTION INTRAVENOUS at 18:34

## 2019-03-21 RX ADMIN — Medication 20 MILLIGRAM(S): at 14:58

## 2019-03-21 RX ADMIN — OXYCODONE AND ACETAMINOPHEN 1 TABLET(S): 5; 325 TABLET ORAL at 11:35

## 2019-03-21 RX ADMIN — OXYCODONE AND ACETAMINOPHEN 1 TABLET(S): 5; 325 TABLET ORAL at 10:38

## 2019-03-21 RX ADMIN — SODIUM CHLORIDE 75 MILLILITER(S): 9 INJECTION, SOLUTION INTRAVENOUS at 04:19

## 2019-03-21 NOTE — PROGRESS NOTE ADULT - ASSESSMENT
78 F PMH of CAD s/p stent to LAD, AS s/p TAVR, PPM, DVT (Jan '18) on coumadin, IVC filter, pyoderma gangrenosum, hx MRSA infections, and total knee replacement c/b joint infections s/p I&D explantation and spacer placement followed by spacer removal and static spacer placement with joint fusion due to infection of initial spacer with complex wound closure by plastic surgery, admission here in November 2018 for hip and thigh wounds, now presenting with confusion and chills at home associated with poor appetite and confusion x 1 week and not eating x 2 days. Noted with fever here, YONATAN on labwork. R knee evaluated by orthopedic surgery.	    Problem/Plan - 1:  ·  Problem: Bacteremia.  Plan: 2' S epidermidis  Daptomycin last day is 3/28/19; PICC placed 3/6/19  blood cultures from 2/28, 3/9 are negative.  ID f/u appreciated  TTE without discrete vegetation, too high-risk for ERIKA and would not   CT chest 2/11 revealing b/l pleural effusions  Poor prognosis overall. Palliative on the case.  There is no consensus among the family members towards embracing hospice care at this point      Problem/Plan - 2:  ·  Problem: Iron deficiency anemia.  Plan: in setting of CK3  s/p venofer 200 mg on 3/7 and 3/8  s/p 1 unit of pRBC on 3/9 and likely another one on 3/15  hgb stable    Problem/Plan - 3:  ·  Problem: Pyoderma gangrenosum.  Plan: c/w yclosporine dose adjusted to 100 mg BID based on levels  completed chronic steroids taper for pyoderma, (she was on Prednisone 5 mg qdaily, tapered off on admission)  Per orthopedic surgery can be OOB primarily to wheelchair and can 50% weight  wound care consult and wound vac management appreciated.   Appreciate derm, will maintain vigilance for any new/expanding purpura    Problem/Plan - 4:  ·  Problem: Altered mental status, unspecified altered mental status type.  Plan: likely 2/2 metabolic encephalopathy given fever and concern for cellulitis  mental status stable  Patient also on narcotics but stable on current regimen.   c/w oxycodone to q6hr PRN.  Per the son, she gets q4hr sometime at home if pain is severe.   she is on home gabapentin 300 mg BID, neurontin tapered down to 100 mg bid and she is completely off for now due to lethargy.     Problem/Plan - 5:  ·  Problem: Acute kidney failure.  Plan: likely cardiorenal. intravascular depleted but grossly volume overloaded.   poor PO intake.   renal appreciated  Holding off diuretics for now  going back and forth with intravascular depletion s/p IVF vs. gross extravascular volume overload  guarded prognosis.     Problem/Plan - 6:  Problem: Coronary artery disease involving native coronary artery of native heart without angina pectoris. Plan: Cont Atovastatin 10 mg QHS.    Problem/Plan - 7:  ·  Problem: Aortic stenosis, severe.  Plan: Pt with severe AS, not candidate for intervention indefinitely    Problem/Plan - 8:  ·  Problem: Gastroesophageal reflux disease, esophagitis presence not specified.  Plan: Protonix.     Problem/Plan - 9:  ·  Problem: Depression, unspecified depression type.    c/w low dose Lexapro 5 mg.     Problem/Plan - 10:  Problem: Need for prophylactic measure. Plan; SC heparin 5000 units q8h.    Problem/Plan - 11:  ·  Problem: poor prognosis. Dr. Palumbo had numerous discussions with different family members.  MOLST forms sign. the  [place] agreed to DNR/DNI, verbal consent.   The son Thony agrees with conservative approach. Palliative family meeting held 3/19/19.  Appreciate palliative intervention.  See their extensive note detailing the meeting.  Family not agreeing to hospice at this point.

## 2019-03-21 NOTE — PROGRESS NOTE ADULT - SUBJECTIVE AND OBJECTIVE BOX
CC: f/u for CoNS bacteremia    Patient more awake today, better poor po intake, better urine output    REVIEW OF SYSTEMS:  All other review of systems negative (Comprehensive ROS)- limited, d/w aide at bedside    Antimicrobials Day # 36  Medications Reviewed    Vital Signs Last 24 Hrs  T(F): 98.7 (21 Mar 2019 11:00), Max: 99 (20 Mar 2019 18:00)  HR: 134 (21 Mar 2019 11:00) (90 - 134)  BP: 106/74 (21 Mar 2019 11:00) (98/60 - 131/82)  BP(mean): --  RR: 18 (21 Mar 2019 11:00) (18 - 18)  SpO2: 98% (21 Mar 2019 11:00) (97% - 98%)    PHYSICAL EXAM:  General: more alert  Eyes:  anicteric, no conjunctival injection, no discharge  Oropharynx: no lesions or injection 	  Neck: supple, without adenopathy  Lungs: course  to auscultation  Heart: regular rate and rhythm; no murmur, rubs or gallops  Abdomen: soft, nondistended, nontender  Skin: no generalized rash  Extremities: R PICC site clean, dependent edema  Neurologic: generalized weakness    LAB RESULTS:                        8.3    29.15 )-----------( 214      ( 20 Mar 2019 09:12 )             28.9   03-21    142  |  107  |  73<H>  ----------------------------<  68<L>  4.8   |  20<L>  |  2.33<H>    Ca    7.9<L>      21 Mar 2019 06:40    Urine Microscopic-Add On (NC) (03.13.19 @ 21:41)    Epithelial Cells: >27 /HPF    Bacteria: Moderate    Hyaline Casts: 0 /LPF    White Blood Cell - Urine: 22 /HPF    Red Blood Cell - Urine: 8 /HPF    MICROBIOLOGY:  RECENT CULTURES:  Culture - Blood (03.09.19 @ 16:44)    Specimen Source: .Blood Blood-Venous    Culture Results:   No growth at 5 days.      RADIOLOGY REVIEWED:  CT Chest No Cont (03.10.19 @ 20:01) >  1.  There are bilateral pleural effusions.  2.  Passive atelectasis from bilateral pleural effusions.

## 2019-03-21 NOTE — PROGRESS NOTE ADULT - ASSESSMENT
Patient with chronic right knee spacer infection, relapse of CoNS bacteremia, likely infected TAVR and/or PPM- on extended abx  Pyoderma gangrenosa with extensive necrotic tissue over her hips, buttocks and thighs, no surgical intervention  Steroids and cycloserine  Still approaching medical futility in general  Pt has been bedbound for months  Poor po intake, anasarca, renal dysfx  Afebrile, more awake/alert today    Plan:  Continue Dapto to q 48h- last week of Tx  Palliative approach remains most rational  D/w aide at bedside

## 2019-03-21 NOTE — PROGRESS NOTE ADULT - SUBJECTIVE AND OBJECTIVE BOX
Waxing/waning MS  Will sometimes open eyes to verbal stimuli, but doesn't always answer simple ?s    Vital Signs Last 24 Hrs  T(C): 37.1 (21 Mar 2019 11:00), Max: 37.2 (20 Mar 2019 18:00)  T(F): 98.7 (21 Mar 2019 11:00), Max: 99 (20 Mar 2019 18:00)  HR: 134 (21 Mar 2019 11:00) (90 - 134)  BP: 106/74 (21 Mar 2019 11:00) (98/60 - 131/82)  BP(mean): --  RR: 18 (21 Mar 2019 11:00) (18 - 18)  SpO2: 98% (21 Mar 2019 11:00) (97% - 98%)    GENERAL: lying in bed, confused at times, morbidly obese  HEAD:  Atraumatic, Normocephalic  EYES: EOMI, PERRLA, conjunctiva and sclera clear  NECK: Supple, No JVD  CHEST/LUNG: mild decrease breath sounds bilaterally; No wheeze   HEART: Regular rate and rhythm; No murmurs, rubs, or gallops  ABDOMEN: Soft, Nontender, Nondistended; Bowel sounds present  BACK: multiple wounds/pressure ulcers all over her entire back   Neuro: awake alert   EXTREMITIES:  2+ Peripheral Pulses, No clubbing, cyanosis.  2+ nonpitting edema LE, UE trace edema stable  Back: sacral and thigh wound. +wound vac in place  SKIN: No rashes or lesions    LABS:                        8.3    29.15 )-----------( 214      ( 20 Mar 2019 09:12 )             28.9     03-21    142  |  107  |  73<H>  ----------------------------<  68<L>  4.8   |  20<L>  |  2.33<H>    Ca    7.9<L>      21 Mar 2019 06:40        CAPILLARY BLOOD GLUCOSE      POCT Blood Glucose.: 187 mg/dL (20 Mar 2019 19:48)

## 2019-03-21 NOTE — PROGRESS NOTE ADULT - SUBJECTIVE AND OBJECTIVE BOX
[facility] - KIRTI Castillo / KIRTI Gracia / RITA Daniels/ KIRTI Zamudio/ KIRTI Irizarry/ LOLLY Hays / CLINT Forbes / LEBRON Boss  ---------------------------------------------------------------------------------------------------------------  seen and examined today for YONATAN  Interval : serum creatinine improved, patient eating more  VITALS:  T(F): 98.7 (03-21-19 @ 11:00), Max: 99 (03-20-19 @ 18:00)  HR: 134 (03-21-19 @ 11:00)  BP: 106/74 (03-21-19 @ 11:00)  RR: 18 (03-21-19 @ 11:00)  SpO2: 98% (03-21-19 @ 11:00)  Wt(kg): --    03-20 @ 07:01  -  03-21 @ 07:00  --------------------------------------------------------  IN: 300 mL / OUT: 0 mL / NET: 300 mL    03-21 @ 07:01  -  03-21 @ 13:22  --------------------------------------------------------  IN: 180 mL / OUT: 0 mL / NET: 180 mL      Physical Exam :-  Constitutional: NAD  Neck: Supple.  Respiratory: Bilateral equal breath sounds,  Cardiovascular: S1, S2 normal,  Gastrointestinal: Bowel Sounds present, soft, non tender.  Extremities: Anasarca  Neurological: Alert and Oriented x 1  Psychiatric: Normal mood, normal affect  Data:-  Allergies :   amoxicillin (Other)  IV Contrast (Flushing (Skin); Nausea)    Hospital Medications:   MEDICATIONS  (STANDING):  ascorbic acid 500 milliGRAM(s) Oral daily  aspirin enteric coated 81 milliGRAM(s) Oral daily  atorvastatin 10 milliGRAM(s) Oral at bedtime  bisacodyl 5 milliGRAM(s) Oral at bedtime  calcium carbonate 1250 mG  + Vitamin D (OsCal 500 + D) 1 Tablet(s) Oral three times a day  cholecalciferol 1000 Unit(s) Oral daily  cycloSPORINE  (SandIMMUNE)  Solution 75 milliGRAM(s) Oral two times a day  Dakins Solution - 1/4 Strength 1 Application(s) Topical two times a day  DAPTOmycin IVPB 700 milliGRAM(s) IV Intermittent every 48 hours  dextrose 5% + sodium chloride 0.9%. 1000 milliLiter(s) (75 mL/Hr) IV Continuous <Continuous>  dronabinol 2.5 milliGRAM(s) Oral two times a day  escitalopram 5 milliGRAM(s) Oral daily  folic acid 1 milliGRAM(s) Oral daily  furosemide   Injectable 20 milliGRAM(s) IV Push once  heparin  Injectable 5000 Unit(s) SubCutaneous every 8 hours  metoprolol tartrate 12.5 milliGRAM(s) Oral two times a day  nystatin    Suspension [id number] Unit(s) Swish and Swallow three times a day  polyethylene glycol 3350 17 Gram(s) Oral daily  vitamin B complex with vitamin C 1 Tablet(s) Oral daily    03-21    142  |  107  |  73<H>  ----------------------------<  68<L>  4.8   |  20<L>  |  2.33<H>    Ca    7.9<L>      21 Mar 2019 06:40      Creatinine Trend: 2.33 <--, 2.81 <--, 2.79 <--, 2.94 <--, 2.49 <--, 2.49 <--, 2.23 <--, 2.00 <--, 2.11 <--                        8.3    29.15 )-----------( 214      ( 20 Mar 2019 09:12 )             28.9

## 2019-03-21 NOTE — PROGRESS NOTE ADULT - ASSESSMENT
77 yo F hx PMR, Pyoderma Gangrenosum (prior steroids, now on cyclosporine), s/p TAVR and explant of infected R Knee for MRSA (doxy suppression, spacer) admitted with fever to 102 and leukocytosis with bacteremia. Renal following for YONATAN on CKD4    #  YONATAN on CKD4- worsening renal function, poor oral intake, encouraged to increase PO intake, labs reviewed and still consistent with pre-renal. Please continue D5 NS to 75cc/hr  # Hyperkalemia: Improved, would provide additional dose of lasix 20mg IV once today  # HTN BP low-consider dec BB dose or d/c  # cellulitis- abxs, pain control per medicine    DNR, poor prognosis in general    labs, chart reviewed

## 2019-03-21 NOTE — CONSULT NOTE ADULT - CONSULT REQUESTED DATE/TIME
12-Feb-2019 15:47
13-Feb-2019 09:51
15-Feb-2019 16:30
21-Mar-2019 10:26
26-Feb-2019 13:30
20-Mar-2019 20:11
12-Mar-2019

## 2019-03-21 NOTE — PROGRESS NOTE ADULT - SUBJECTIVE AND OBJECTIVE BOX
SUBJECTIVE: Pt seen, chart reviewed.  patient re evaluated  Palliative and derm notes reviewed  Spoke with  this morning, and advised f/u after exam    Allergies    amoxicillin (Other)    Intolerances    IV Contrast (Flushing (Skin); Nausea)      aspirin enteric coated 81 milliGRAM(s) Oral daily  DAPTOmycin IVPB 700 milliGRAM(s) IV Intermittent every 48 hours  heparin  Injectable 5000 Unit(s) SubCutaneous every 8 hours  nystatin    Suspension 508432 Unit(s) Swish and Swallow three times a day      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  Artificial cardiac pacemaker: 12/25/17  H/O cardiac catheterization: 11/29/16 HERBERT placed on LAD  H/O endoscopy: with dilatation of esophageal stricture 2013  S/P cataract surgery: x2; 3-4yr ago  S/P gastroplasty: 28 yrs ago  S/P cholecystectomy: more than 15 years ago  S/P total knee replacement: left, 15yr ago  S/P total knee replacement: right, 12yr ago  S/P hysterectomy: 24yr ago      HEALTH ISSUES - PROBLEM Dx:  Functional quadriplegia  Bacteremia  Debility  Palliative care encounter  History of pyoderma  Encephalopathy  Delirium due to another medical condition  Need for prophylactic measure: Need for prophylactic measure  Hypernatremia: Hypernatremia  Edema: Edema  Bacteremia: Bacteremia  Iron deficiency anemia: Iron deficiency anemia  Hyperkalemia: Hyperkalemia  Acute kidney injury superimposed on CKD: Acute kidney injury superimposed on CKD  Acute kidney failure: Acute kidney failure  Pyoderma gangrenosum: Pyoderma gangrenosum  Depression, unspecified depression type: Depression, unspecified depression type  Medication management: Medication management  Gastroesophageal reflux disease, esophagitis presence not specified: Gastroesophageal reflux disease, esophagitis presence not specified  Aortic stenosis, severe: Aortic stenosis, severe  Coronary artery disease involving native coronary artery of native heart without angina pectoris: Coronary artery disease involving native coronary artery of native heart without angina pectoris  Altered mental status, unspecified altered mental status type: Altered mental status, unspecified altered mental status type  Cellulitis of leg, right: Cellulitis of leg, right          FAMILY HISTORY:  No pertinent family history in first degree relatives      Physical Exam:  Vital Signs Last 24 Hrs  T(C): 36.8 (21 Mar 2019 16:35), Max: 37.1 (21 Mar 2019 00:36)  T(F): 98.3 (21 Mar 2019 16:35), Max: 98.8 (21 Mar 2019 00:36)  HR: 87 (21 Mar 2019 16:35) (87 - 134)  BP: 126/66 (21 Mar 2019 16:35) (106/74 - 131/82)  BP(mean): --  RR: 17 (21 Mar 2019 16:35) (17 - 18)  SpO2: 98% (21 Mar 2019 11:00) (98% - 98%)    Poorly responsive  At bedrest using nasal O2  With the  assistance of Turn Team , and inflatable pad, patient was able to be examined  Patient was pre medicated , but protested dressing changes throughout  There are multiple, non odorous, wounds confined primarily to bilateral posterior thighs and flanks, the majority of which are covered by a thick eschar, and none in need of debridement  Aquacel placed over wounds and patient positioned on a pad designed to wick fluid    Aide reports that patient was able to take a portion of breakfast today  Ongoing care continues    LABS:                        8.3    29.73 )-----------( 212      ( 21 Mar 2019 08:22 )             28.5           Outpatient follow up information provided- [phone]

## 2019-03-21 NOTE — CONSULT NOTE ADULT - CONSULT REASON
Assistance with medical decision making
PG
kasia on ckd 3
leukocytosis and
poor PO intake, dysphagia (established office pt)
sepsis
Ulcers

## 2019-03-21 NOTE — CONSULT NOTE ADULT - SUBJECTIVE AND OBJECTIVE BOX
Patient is a 78y old  Female who presented with a chief complaint of Cellulitis (20 Mar 2019 20:11)      HPI:  78F PMH of CAD s/p stent to LAD, AS s/p TAVR, PPM, DVT (Jan '18) on coumadin, IVC filter, pyoderma gangrenosum, hx MRSA infections, and total knee replacement c/b joint infections s/p I&D explantation and spacer placement followed by spacer removal and static spacer placement with joint fusion due to infection of initial spacer with complex wound closure by plastic surgery, admission here in November 2018 for hip and thigh wounds, now presenting with confusion and chills at home associated with poor appetite and confusion x 1 week and not eating x 2 days. Patient confused per aide, not remembering things she normally would. Has refused to eat food for two days citing poor appetite and has had poor mood though no suicidality. Patient with chronic pains of wounds of her hip/thigh as well as chronic R knee pain due to surgeries as above. There is some erythema surrounding the right knee that aide believes is somewhat worse than baseline over last 2-3 days but no exudates. Other wounds have been healing well per aide and she has wound vac on R hip and dressings in place on left thigh/glute. Patient has however also complained of chills at home. Denies any chest pains at this time, has not complained of this at home per aide. 	 Denies SOB at this time. (12 Feb 2019 23:38)    BCx 2/2 with Staph epidermidis, now on daptomycin. Currently undergoing GOC discussions with family and Palliative care.  We are asked by family to see pt for poor PO intake, admits coughing after feeding assistance as per aide (primary caregiver), on puree diet with thin liquids. no odynophagia.  Pt has had multiple prolonged hospitalizations and family/aide admit to significant decline in functional status  patient and family do not want any feeding tube placement but are open to appetite stimulants  remote history of vertical sleeve gastroplasty  Pt is bedbound  Per ID notes review -  chronic right knee spacer infection, relapse of CoNS bacteremia, likely infected TAVR and/or PPM- on extended abx    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  Artificial cardiac pacemaker: 12/25/17  H/O cardiac catheterization: 11/29/16 HERBERT placed on LAD  H/O endoscopy: with dilatation of esophageal stricture 2013  S/P cataract surgery: x2; 3-4yr ago  S/P gastroplasty: 28 yrs ago  S/P cholecystectomy: more than 15 years ago  S/P total knee replacement: left, 15yr ago  S/P total knee replacement: right, 12yr ago  S/P hysterectomy: 24yr ago      Allergies  amoxicillin (Other)    Intolerances  IV Contrast (Flushing (Skin); Nausea)      MEDICATIONS  (STANDING):  ascorbic acid 500 milliGRAM(s) Oral daily  aspirin enteric coated 81 milliGRAM(s) Oral daily  atorvastatin 10 milliGRAM(s) Oral at bedtime  bisacodyl 5 milliGRAM(s) Oral at bedtime  calcium carbonate 1250 mG  + Vitamin D (OsCal 500 + D) 1 Tablet(s) Oral three times a day  cholecalciferol 1000 Unit(s) Oral daily  cycloSPORINE  (SandIMMUNE)  Solution 75 milliGRAM(s) Oral two times a day  Dakins Solution - 1/4 Strength 1 Application(s) Topical two times a day  DAPTOmycin IVPB 700 milliGRAM(s) IV Intermittent every 48 hours  dextrose 5% + sodium chloride 0.9%. 1000 milliLiter(s) (75 mL/Hr) IV Continuous <Continuous>  escitalopram 5 milliGRAM(s) Oral daily  folic acid 1 milliGRAM(s) Oral daily  heparin  Injectable 5000 Unit(s) SubCutaneous every 8 hours  metoprolol tartrate 12.5 milliGRAM(s) Oral two times a day  nystatin    Suspension 490019 Unit(s) Swish and Swallow three times a day  polyethylene glycol 3350 17 Gram(s) Oral daily  vitamin B complex with vitamin C 1 Tablet(s) Oral daily    MEDICATIONS  (PRN):  acetaminophen   Tablet .. 650 milliGRAM(s) Oral every 6 hours PRN Mild Pain (1 - 3)  acetaminophen   Tablet .. 650 milliGRAM(s) Oral every 6 hours PRN Temp greater or equal to 38C (100.4F)  melatonin 3 milliGRAM(s) Oral at bedtime PRN Insomnia  oxyCODONE    5 mG/acetaminophen 325 mG 1 Tablet(s) Oral every 6 hours PRN Severe Pain (7 - 10)      Social History:    Marital Status:  ( X  )    (   ) Single    (   )    (  )     No tobacco, ETOH    Advanced Directives: (     ) None    (  X    ) DNR    (    X ) DNI    (     ) Health Care Proxy:     Review of Systems:    General:  +fatigue, lethargy. no fever  CV:  No pain, palpitations, +severe AS s/p TAVR  Resp:  +dysphagia, asthma  GI:  see HPI  :  dark urine +renal insufficiency  Muscle:  see HPI  Neuro:  +gen weakness  Psych:  +anxiety/depression +lethargy  Endocrine:  No polyuria, polydypsia, cold/heat intolerance  Heme:  see HPI  Skin:  see HPI +MRSA wounds      Vital Signs Last 24 Hrs  T(C): 37.1 (21 Mar 2019 00:36), Max: 37.2 (20 Mar 2019 18:00)  T(F): 98.8 (21 Mar 2019 00:36), Max: 99 (20 Mar 2019 18:00)  HR: 98 (21 Mar 2019 00:36) (77 - 98)  BP: 131/82 (21 Mar 2019 00:47) (98/60 - 131/82)  BP(mean): --  RR: 18 (21 Mar 2019 00:36) (18 - 18)  SpO2: 98% (21 Mar 2019 00:36) (97% - 98%)    PHYSICAL EXAM:    Constitutional: frail appearing morbidly obese female lethargic but responsive, aide and spouse at bedside +hypophonia  Neck: No JVD  Respiratory: decreased BS b/l +rhonchi  Cardiovascular: S1 and S2 +murmur, tachy  Gastrointestinal: obese soft well healed midline scar NT  Extremities +anasarca +multiple wounds and scars  Vascular: 2+ peripheral pulses  Neurological: responsive, drowsy but arousable  Psychiatric: depressed affect  Skin: multiple wounds with dressing, scars +anasarca        LABS:                        8.3    29.15 )-----------( 214      ( 20 Mar 2019 09:12 )             28.9     03-21    142  |  107  |  73<H>  ----------------------------<  68<L>  4.8   |  20<L>  |  2.33<H>    Ca    7.9<L>      21 Mar 2019 06:40      LIVER FUNCTIONS - ( 18 Mar 2019 00:15 )  Alb: 0.7 g/dL / Pro: 4.1 g/dL / ALK PHOS: 283 U/L / ALT: 19 U/L / AST: 25 U/L / GGT: x             RADIOLOGY & ADDITIONAL TESTS:  < from: CT Chest No Cont (03.10.19 @ 20:01) >  FINDINGS:     Tubes/Lines: Right right-sided PICC line, with distal tip in the SVC.    Lungs And Airways: Passive atelectasis from adjacent pleural effusions.   There are scattered calcified granulomas. The lungs are otherwise clear.   The airways are unremarkable.      Pleura:No pneumothorax.  There are bilateral pleural effusions.    Mediastinum: There are no enlarged chest lymph nodes. The visualized   portion of the thyroid gland is unremarkable.       Heart and Vasculature: The heart is normal in size.  There is no   pericardial effusion. Status post TAVR.  Coronary artery disease with calcified plaque, status post stent of the   left anterior descending artery and left circumflex.      The main pulmonary artery is enlarged. Left-sided aortic arch and   left-sided descending thoracic aorta. The aorta is ectatic. Atheromatous   disease of the aorta.    Upper Abdomen:  Partially visualized left renal cyst measuring 5.4 cm.   Atherosclerotic calcifications of the aorta and its branches. Status post   gastric bypass.    Bones And Soft Tissues: There are degenerative changes of the visualized   spine. Partially visualizedmild gynecomastia. Left chest wall Mediport,   with distal tip of the lead in the right ventricle.  Otherwise, the soft   tissues are unremarkable.    IMPRESSION:     1.  There are bilateral pleural effusions.  2.  Passive atelectasis from bilateral pleural effusions.

## 2019-03-21 NOTE — CONSULT NOTE ADULT - ASSESSMENT
78 year old F with chronic right knee spacer infection, relapse of CoNS bacteremia, likely infected TAVR and/or PPM- on extended abx  Pyoderma gangrenosa with extensive necrotic tissue over her hips, buttocks and thighs, no surgical intervention  Steroids and cyclosporine  Pt has been bedbound for months  Poor po intake, anasarca, diminished urine output, hypotension, renal dysfx    FTT, dysphagia - family and pt have expressed wishes for no feeding tube placement    RECS:  -wound care  -Abx as per ID  -Derm following  -Palliative following  -can change diet to puree with nectar thickened fluid for dysphagia with aspiration precautions, assist with feeding (as is currently in place)  -Can try marinol for appetite stimulant, pt and spouse agreeable    Poor prognosis  supportive care    Thank you for the courtesy of this consult.  Discussed with [facility] attending, primary GI (Annia) updated    Irwin Bradshaw PA-C    [facility]  [phone]

## 2019-03-21 NOTE — CONSULT NOTE ADULT - ATTENDING COMMENTS
Problem: Encephalopathy: Multifactorial. Lethargic. Minimal po intake.  Minimal engagement     Problem Bacteremia: LT abx. Plan is for several weeks of treatment. ? PPM/Valve source, unclear  Abx by primary team      Problem Knee replacement. Hx of substantial complications. Ambulation is limited  Wheelchairs is used in the home according to the HHA    Problem Pain  The  is concerned with narcosis with even minimal doses of medication  It appears from chart review and reports by the staff/HHA is that the  assigns a disproportionate amount of blame on the opiates, even if used at low doses or sparingly.    Problem Functional Quadriplegia   Requires full assistance with all ADLs    Problem Pyoderma granulosa. Wounds are open, which were not previously according to the HHA    Problem Palliative Care Encounter  Given collateral information obtained by HHA and staff, it appears that goals of care discussion will be protracted given the reported stance of the  and other family members.  There is no HCP in the chart and [facility] the  will be surrogate until a HCP form can be provided stating otherwise  She has three children, two sons and one daughter  The HHA reports that the daughter is currently hospitalized as well, further contributing to the stress of the .  Consider alpha tab search to determine if previous advance directives exist
I agree with the above note and have personally seen and examined this patient. All pertinent films have been reviewed. Please refer to clinical documentation of the history, physical examinations, data summary, and both assessment and plan as documented above and with which I agree.    In brief, this is an 78F who I have followed for the past 1.5 years who has a history of a R TKA PJI s/p explant and spacer placement with failed clearance of infection s/p revision TKA spacer (static) with knee fusion. Had difficulty with wound healing postop with plastic surgery management and development of PG with multiple LE and abdomenal wounds. These have bene healing and about 4 months ago she transferred care to [facility] for wound management. Now is home and has been doing homecare but now is back in the hospital acutely ill. Confused. Disoriented. No significant knee pain. +fever/chills/SOB. Wounds continue to heal well per pictures. R knee now completely healed.    XR: static spacer in place, unchanged from prior exams  Supposed to be on chronic lifelong suppressive antibiotics but not on them at this time, unclear who stopped these medications    Thigh/abd/knee wounds are managed by PRS/wound care  Recommend ID consult given complex hx. previously was on minocycline  Allowed RLE 50% weightbearing on RLE with NO KNEE MOTION allowed, knee immobilizer at all times if OOB  No plan for orthopedic intervention for the R knee, not a good candidate for conversion to TKA or removal of spacer given difficult of wound healing    Nate Bass MD  Attending Orthopedic Surgeon
AGree with above managment
I have seen and evaluated the patient. I agree with findings and plan as discussed.
I have seen and examined Gunjan at the bedside. She has pyoderma gangrenosum with active ulcerations on the left hip and left lower back with wound vac on the right hip. She will eventually need to be restarted on immunosuppression. This is currently being managed by her outpatient dermatologist, Dr. Salas. She should follow-up as an outpatient with him for management. He is aware of her current hospital admission.     Radha Britt MD, DTMH

## 2019-03-22 LAB
ANION GAP SERPL CALC-SCNC: 13 MMOL/L — SIGNIFICANT CHANGE UP (ref 5–17)
BASOPHILS # BLD AUTO: 0 K/UL — SIGNIFICANT CHANGE UP (ref 0–0.2)
BUN SERPL-MCNC: 68 MG/DL — HIGH (ref 7–23)
CALCIUM SERPL-MCNC: 7.8 MG/DL — LOW (ref 8.4–10.5)
CHLORIDE SERPL-SCNC: 111 MMOL/L — HIGH (ref 96–108)
CO2 SERPL-SCNC: 20 MMOL/L — LOW (ref 22–31)
CREAT SERPL-MCNC: 1.81 MG/DL — HIGH (ref 0.5–1.3)
CYCLOSPORINE SER-MCNC: 180 NG/ML — SIGNIFICANT CHANGE UP (ref 150–400)
EOSINOPHIL # BLD AUTO: 0.1 K/UL — SIGNIFICANT CHANGE UP (ref 0–0.5)
EOSINOPHIL NFR BLD AUTO: 1 % — SIGNIFICANT CHANGE UP (ref 0–6)
GLUCOSE SERPL-MCNC: 96 MG/DL — SIGNIFICANT CHANGE UP (ref 70–99)
HCT VFR BLD CALC: 27.9 % — LOW (ref 34.5–45)
HGB BLD-MCNC: 8.4 G/DL — LOW (ref 11.5–15.5)
LYMPHOCYTES # BLD AUTO: 2.2 K/UL — SIGNIFICANT CHANGE UP (ref 1–3.3)
LYMPHOCYTES # BLD AUTO: 4 % — LOW (ref 13–44)
MCHC RBC-ENTMCNC: 27.5 PG — SIGNIFICANT CHANGE UP (ref 27–34)
MCHC RBC-ENTMCNC: 30.1 GM/DL — LOW (ref 32–36)
MCV RBC AUTO: 91.3 FL — SIGNIFICANT CHANGE UP (ref 80–100)
MONOCYTES # BLD AUTO: 0.6 K/UL — SIGNIFICANT CHANGE UP (ref 0–0.9)
MONOCYTES NFR BLD AUTO: 3 % — SIGNIFICANT CHANGE UP (ref 2–14)
NEUTROPHILS # BLD AUTO: 25.2 K/UL — HIGH (ref 1.8–7.4)
NEUTROPHILS NFR BLD AUTO: 92 % — HIGH (ref 43–77)
NRBC # BLD: 2 /100 — HIGH (ref 0–0)
PLAT MORPH BLD: NORMAL — SIGNIFICANT CHANGE UP
PLATELET # BLD AUTO: 217 K/UL — SIGNIFICANT CHANGE UP (ref 150–400)
POTASSIUM SERPL-MCNC: 4.8 MMOL/L — SIGNIFICANT CHANGE UP (ref 3.5–5.3)
POTASSIUM SERPL-SCNC: 4.8 MMOL/L — SIGNIFICANT CHANGE UP (ref 3.5–5.3)
RBC # BLD: 3.06 M/UL — LOW (ref 3.8–5.2)
RBC # FLD: 21 % — HIGH (ref 10.3–14.5)
RBC BLD AUTO: SIGNIFICANT CHANGE UP
SODIUM SERPL-SCNC: 144 MMOL/L — SIGNIFICANT CHANGE UP (ref 135–145)
WBC # BLD: 28.1 K/UL — HIGH (ref 3.8–10.5)
WBC # FLD AUTO: 28.1 K/UL — HIGH (ref 3.8–10.5)

## 2019-03-22 RX ORDER — FUROSEMIDE 40 MG
20 TABLET ORAL ONCE
Qty: 0 | Refills: 0 | Status: COMPLETED | OUTPATIENT
Start: 2019-03-22 | End: 2019-03-22

## 2019-03-22 RX ORDER — MULTIVIT-MIN/FERROUS GLUCONATE 9 MG/15 ML
1 LIQUID (ML) ORAL DAILY
Qty: 0 | Refills: 0 | Status: DISCONTINUED | OUTPATIENT
Start: 2019-03-22 | End: 2019-03-25

## 2019-03-22 RX ADMIN — DAPTOMYCIN 128 MILLIGRAM(S): 500 INJECTION, POWDER, LYOPHILIZED, FOR SOLUTION INTRAVENOUS at 05:00

## 2019-03-22 RX ADMIN — Medication 20 MILLIGRAM(S): at 12:05

## 2019-03-22 RX ADMIN — Medication 1 TABLET(S): at 05:00

## 2019-03-22 RX ADMIN — SODIUM CHLORIDE 75 MILLILITER(S): 9 INJECTION, SOLUTION INTRAVENOUS at 05:01

## 2019-03-22 RX ADMIN — OXYCODONE AND ACETAMINOPHEN 1 TABLET(S): 5; 325 TABLET ORAL at 13:13

## 2019-03-22 RX ADMIN — OXYCODONE AND ACETAMINOPHEN 1 TABLET(S): 5; 325 TABLET ORAL at 05:10

## 2019-03-22 RX ADMIN — OXYCODONE AND ACETAMINOPHEN 1 TABLET(S): 5; 325 TABLET ORAL at 14:00

## 2019-03-22 RX ADMIN — HEPARIN SODIUM 5000 UNIT(S): 5000 INJECTION INTRAVENOUS; SUBCUTANEOUS at 13:06

## 2019-03-22 RX ADMIN — CYCLOSPORINE 75 MILLIGRAM(S): 100 CAPSULE ORAL at 12:00

## 2019-03-22 RX ADMIN — Medication 500000 UNIT(S): at 05:00

## 2019-03-22 RX ADMIN — HEPARIN SODIUM 5000 UNIT(S): 5000 INJECTION INTRAVENOUS; SUBCUTANEOUS at 05:00

## 2019-03-22 RX ADMIN — HEPARIN SODIUM 5000 UNIT(S): 5000 INJECTION INTRAVENOUS; SUBCUTANEOUS at 21:36

## 2019-03-22 RX ADMIN — CYCLOSPORINE 75 MILLIGRAM(S): 100 CAPSULE ORAL at 21:42

## 2019-03-22 NOTE — PROGRESS NOTE ADULT - ASSESSMENT
77 yo F hx PMR, Pyoderma Gangrenosum (prior steroids, now on cyclosporine), s/p TAVR and explant of infected R Knee for MRSA (doxy suppression, spacer) admitted with fever to 102 and leukocytosis with bacteremia. Renal following for YONATAN on CKD4    #  YONATAN on CKD4- worsening renal function, poor oral intake, encouraged to increase PO intake, labs reviewed and still consistent with pre-renal. Please continue D5 NS to 75cc/hr  # Hyperkalemia: Improved, please provide lasix 20mg IV once today  # HTN BP low-consider dec BB dose or d/c  # cellulitis- abxs, pain control per medicine    DNR, poor prognosis in general    labs, chart reviewed

## 2019-03-22 NOTE — PROGRESS NOTE ADULT - ASSESSMENT
78 year old F with chronic right knee spacer infection, relapse of CoNS bacteremia, likely infected TAVR and/or PPM- on extended abx  Pyoderma gangrenosa with extensive necrotic tissue over her hips, buttocks and thighs, no surgical intervention  Steroids and cyclosporine  Pt has been bedbound for months  Poor po intake, anasarca, diminished urine output, hypotension, renal dysfx    FTT, dysphagia - family and pt have expressed wishes for no feeding tube placement    RECS:  -wound care  -Abx as per ID  -Derm following  -Palliative following  -puree with nectar thickened fluid for dysphagia with aspiration precautions, assist with feeding (as is currently in place)  -Continue Marinol for appetite stimulant    Poor prognosis  supportive care    Please call over weekend prn with GI concerns   GI service : [phone]    Irwin Bradshaw PA-C    [facility]

## 2019-03-22 NOTE — PROGRESS NOTE ADULT - SUBJECTIVE AND OBJECTIVE BOX
CC: f/u for  cns bacteremia  Patient reports  no,no,no,no  REVIEW OF SYSTEMS:  All other review of systems cannot get (Comprehensive ROS)    Antimicrobials Day #  :37/42  DAPTOmycin IVPB 700 milliGRAM(s) IV Intermittent every 48 hours  nystatin    Suspension 758249 Unit(s) Swish and Swallow three times a day    Other Medications Reviewed    T(F): 98.4 (03-22-19 @ 16:52), Max: 98.5 (03-22-19 @ 00:03)  HR: 94 (03-22-19 @ 16:52)  BP: 112/70 (03-22-19 @ 16:52)  RR: 16 (03-22-19 @ 16:52)  SpO2: 99% (03-22-19 @ 16:52)  Wt(kg): --    PHYSICAL EXAM:  General: lethargic,  acute distress  Eyes:  anicteric, no conjunctival injection, no discharge  Oropharynx: no lesions or injection 	  Neck: supple, without adenopathy  Lungs: clear anterior  to auscultation  Heart: s1s2   Abdomen: soft, nondistended, nontender, without mass or organomegaly  Skin: no lesions  Extremities: thighs with eschars  Neurologic: poorly interactive    LAB RESULTS:                        8.4    28.1  )-----------( 217      ( 22 Mar 2019 06:19 )             27.9     03-22    144  |  111<H>  |  68<H>  ----------------------------<  96  4.8   |  20<L>  |  1.81<H>    Ca    7.8<L>      22 Mar 2019 06:19          MICROBIOLOGY:  RECENT CULTURES:  03-21 @ 14:32 .Blood Blood-Peripheral     No growth to date.      03-21 @ 10:02 .Blood Blood-Peripheral     No growth to date.        Assessment:  Elderly female, tavr, ppm, pyoderma gangrenosa and extensive pressure necrosis of the buttocks and thighs, bedbound, on cyclosporin without much impact anymore, relapsed cns bacteremia likely due to protected focus in tavr or ppm but not surgical candidate. extensive wounds will not heal. I suspect we have essentially reached medical futility  Plan: 5 more days of dapto  she will likely relapse no matter what we do  hospice

## 2019-03-22 NOTE — PROGRESS NOTE ADULT - SUBJECTIVE AND OBJECTIVE BOX
[facility] - KIRTI Castillo / KIRTI Gracia / RITA Daniels/ KIRTI Zamudio/ KIRTI Irizarry/ LOLLY Hays / CLINT Forbes / LEBRON Boss  ---------------------------------------------------------------------------------------------------------------  seen and examined today for YONATAN  Interval : serum creatinine improving, appetite improved yesterday  VITALS:  T(F): 98.5 (03-22-19 @ 07:00), Max: 98.5 (03-22-19 @ 00:03)  HR: 95 (03-22-19 @ 07:00)  BP: 99/65 (03-22-19 @ 07:00)  RR: 17 (03-22-19 @ 07:00)  SpO2: 98% (03-22-19 @ 07:00)  Wt(kg): --    03-21 @ 07:01  -  03-22 @ 07:00  --------------------------------------------------------  IN: 380 mL / OUT: 0 mL / NET: 380 mL      Physical Exam :-  Constitutional: NAD  Neck: Supple.  Respiratory: Bilateral equal breath sounds,  Cardiovascular: S1, S2 normal,  Gastrointestinal: Bowel Sounds present, soft, non tender.  Extremities: Edema 2+  Neurological: Alert and Orientedx0  Psychiatric: Normal mood, normal affect  Data:-  Allergies :   amoxicillin (Other)  IV Contrast (Flushing (Skin); Nausea)    Hospital Medications:   MEDICATIONS  (STANDING):  ascorbic acid 500 milliGRAM(s) Oral daily  aspirin enteric coated 81 milliGRAM(s) Oral daily  atorvastatin 10 milliGRAM(s) Oral at bedtime  bisacodyl 5 milliGRAM(s) Oral at bedtime  calcium carbonate 1250 mG  + Vitamin D (OsCal 500 + D) 1 Tablet(s) Oral three times a day  cholecalciferol 1000 Unit(s) Oral daily  cycloSPORINE  (SandIMMUNE)  Solution 75 milliGRAM(s) Oral two times a day  DAPTOmycin IVPB 700 milliGRAM(s) IV Intermittent every 48 hours  dextrose 5% + sodium chloride 0.9%. 1000 milliLiter(s) (75 mL/Hr) IV Continuous <Continuous>  dronabinol 2.5 milliGRAM(s) Oral two times a day  escitalopram 5 milliGRAM(s) Oral daily  folic acid 1 milliGRAM(s) Oral daily  furosemide   Injectable 20 milliGRAM(s) IV Push once  heparin  Injectable 5000 Unit(s) SubCutaneous every 8 hours  metoprolol tartrate 12.5 milliGRAM(s) Oral two times a day  nystatin    Suspension 501957 Unit(s) Swish and Swallow three times a day  polyethylene glycol 3350 17 Gram(s) Oral daily  vitamin B complex with vitamin C 1 Tablet(s) Oral daily    03-22    144  |  111<H>  |  68<H>  ----------------------------<  96  4.8   |  20<L>  |  1.81<H>    Ca    7.8<L>      22 Mar 2019 06:19      Creatinine Trend: 1.81 <--, 2.33 <--, 2.81 <--, 2.79 <--, 2.94 <--, 2.49 <--, 2.49 <--, 2.23 <--                        8.4    28.1  )-----------( 217      ( 22 Mar 2019 06:19 )             27.9

## 2019-03-22 NOTE — PROGRESS NOTE ADULT - ASSESSMENT
78 F PMH of CAD s/p stent to LAD, AS s/p TAVR, PPM, DVT (Jan '18) on coumadin, IVC filter, pyoderma gangrenosum, hx MRSA infections, and total knee replacement c/b joint infections s/p I&D explantation and spacer placement followed by spacer removal and static spacer placement with joint fusion due to infection of initial spacer with complex wound closure by plastic surgery, admission here in November 2018 for hip and thigh wounds, now presenting with confusion and chills at home associated with poor appetite and confusion x 1 week and not eating x 2 days. Noted with fever here, YONTAAN on labwork. R knee evaluated by orthopedic surgery.	    Problem/Plan - 1:  ·  Problem: Bacteremia.  Plan: 2' S epidermidis  Daptomycin last day is 3/28/19; PICC placed 3/6/19  blood cultures from 2/28, 3/9 are negative.  ID f/u appreciated  TTE without discrete vegetation, too high-risk for ERIKA and would not   CT chest 2/11 revealing b/l pleural effusions  Poor prognosis overall. Extensive palliative involvement is appreciated      Problem/Plan - 2:  ·  Problem: Iron deficiency anemia.  Plan: in setting of CK3  s/p venofer 200 mg on 3/7 and 3/8  s/p 1 unit of pRBC on 3/9 and likely another one on 3/15  hgb stable    Problem/Plan - 3:  ·  Problem: Pyoderma gangrenosum.  Plan: c/w yclosporine dose adjusted to 100 mg BID based on levels  completed chronic steroids taper for pyoderma, (she was on Prednisone 5 mg qdaily, tapered off on admission)  Per orthopedic surgery can be OOB primarily to wheelchair and can 50% weight  wound care consult and wound vac management appreciated.   Appreciate derm, will maintain vigilance for any new/expanding purpura    Problem/Plan - 4:  ·  Problem: Altered mental status, unspecified altered mental status type.  Plan: likely 2/2 metabolic encephalopathy given fever and concern for cellulitis  mental status remains waxing and waning at best  c/w percocet q6hr PRN.  Per the son, she gets q4hr sometime at home if pain is severe.   she is on home gabapentin 300 mg BID, neurontin completely off for now due to lethargy.     Problem/Plan - 5:  ·  Problem: Acute kidney failure.  Plan: likely cardiorenal. intravascular depleted but grossly volume overloaded.   poor PO intake.   renal appreciated  Holding off diuretics for now  going back and forth with intravascular depletion s/p IVF vs. gross extravascular volume overload  guarded prognosis.     Problem/Plan - 6:  Problem: Coronary artery disease involving native coronary artery of native heart without angina pectoris. Plan: Cont Atovastatin 10 mg QHS.    Problem/Plan - 7:  ·  Problem: Aortic stenosis, severe.  Plan: Pt with severe AS, not candidate for intervention indefinitely    Problem/Plan - 8:  ·  Problem: Gastroesophageal reflux disease, esophagitis presence not specified.  Plan: Protonix.     Problem/Plan - 9:  ·  Problem: Depression, unspecified depression type.    c/w low dose Lexapro 5 mg.     Problem/Plan - 10:  Problem: Need for prophylactic measure. Plan; SC heparin 5000 units q8h.    Problem/Plan - 11:  ·  Problem: poor prognosis. Dr. Palumbo had numerous discussions with different family members.  MOLST forms sign. the  [place] agreed to DNR/DNI, verbal consent.   The son Thony agrees with conservative approach. Palliative family meeting held 3/19/19.  Appreciate palliative intervention.  See their extensive notes detailing the meeting and followup conversations.  Family not agreeing to PEG or hospice at this point.

## 2019-03-22 NOTE — PROGRESS NOTE ADULT - SUBJECTIVE AND OBJECTIVE BOX
She is awake at times, repeatedly saying "help me" but does not delineate further to her aide or myself; PO intake remains abysmal    Vital Signs Last 24 Hrs  T(C): 36.9 (22 Mar 2019 07:00), Max: 36.9 (22 Mar 2019 00:03)  T(F): 98.5 (22 Mar 2019 07:00), Max: 98.5 (22 Mar 2019 00:03)  HR: 95 (22 Mar 2019 07:00) (87 - 95)  BP: 99/65 (22 Mar 2019 07:00) (99/65 - 126/66)  BP(mean): --  RR: 17 (22 Mar 2019 07:00) (16 - 17)  SpO2: 98% (22 Mar 2019 07:00) (98% - 98%)    GENERAL: lying in bed, confused, morbidly obese  HEAD:  Atraumatic, Normocephalic  EYES: EOMI, PERRLA, conjunctiva and sclera clear  NECK: Supple, No JVD  CHEST/LUNG: mild decrease breath sounds bilaterally; No wheeze   HEART: Regular rate and rhythm; No murmurs, rubs, or gallops  ABDOMEN: Soft, Nontender, Nondistended; Bowel sounds present  BACK: multiple wounds/pressure ulcers all over her entire back   Neuro: awake alert   EXTREMITIES:  2+ Peripheral Pulses, No clubbing, cyanosis.  2+ nonpitting edema LE, UE trace edema stable  Back: sacral and thigh wound. +wound vac in place  SKIN: No rashes or lesions    LABS:                        8.4    28.1  )-----------( 217      ( 22 Mar 2019 06:19 )             27.9     03-22    144  |  111<H>  |  68<H>  ----------------------------<  96  4.8   |  20<L>  |  1.81<H>    Ca    7.8<L>      22 Mar 2019 06:19        CAPILLARY BLOOD GLUCOSE

## 2019-03-22 NOTE — CHART NOTE - NSCHARTNOTEFT_GEN_A_CORE
I called the patient's  and asked how he was, he responded, "I am alone, she is in the hospital...We are  61 years."    He followed with a call he had gotten about discharge planning.  "If she goes to a LTC facility, she's gonna die. Now she is showing progress.  If they move her, they sent her home, she's going to die. Meanwhile, she is improving, wounds are improving, and I don't want to affect that."    When exploring his understanding of the current condition he responded as follows:  "I don't want to do anything that hurt her chances, I lost my family in the Holocaust, I am a survivor... I have nobody doctor. I am all alone. I have nobody. She needs more care. She needs to be removed from the chair...use the violet lift..they want to destroy my life and destroy me.  I tried everything.  If you destroy her, you will destroy me... She is showing some positive results..She is showing progress...now she is urinating...she is reacting better...she is awake now, I try to bring her food. She is eating the chicken soup which is a positive reaction...She  is showing some positive progress. Don't take that away."      If she need antibiotics and IVF her,  wants that completed.        I explained the acute medical needs for continued admission.    Despite this explanation he would like her to remain in the hospital for continued treatment.  Moreover, I explained the hierarchy of decision making related to discharge (principal attending and consultative support)    The patient's care plan has been further delineated.    He declined rabbinical or chaplaincy support.    Discharge planning according to the primary team and floor staff      Thank you for the consult.      Time spent >15 min

## 2019-03-22 NOTE — CHART NOTE - NSCHARTNOTEFT_GEN_A_CORE
Pt seen for malnutrition follow up. Pt admitted c cellulitis, pt c multiple pressure injuries, bacteremia, iron deficiency anemia, YONATAN (noted Nephrology following- noted Cr rising and pt now c hyperkalemia, as per Nephrology, likely steroid related at this time and to be treated medically). Noted pt S/P RRT 3/18 for hypotension and hypoglycemia.  Family meeting held 3/18, [facility] discussions ongoing Noted pt was made DNR/DNI recently.     Source: Patient [ ]    Family [ ]     other [ x] Aide at bedside, pt sleeping at time of visit     Diet : Dysphagia 1 diet with nectar consistency liquids, no concentrated potassium, ensure enlive 2 daily       Per aide pt with no complaints of N+V, pt with very small bowel movement yesterday. Pt seen by GI reported with coughing associated with feeding by aide, Family does not wish for feeding tube placement, no plan for formal swallowing evaluation at this time per NP, diet changed to dysphagia 1 diet with nectar thickened liquids, per aide pt dislikes thickened liquids, no reported coughing during breakfast today.      PO intake: Per aide pt with ongoing poor po intake, pt will takes spoons of meals even those which pt requests herself, pt had a few spoons of ensure shake today and farina, pt will request items such as cream soups like broccoli and cheddar which family will provide and pt will take little of. Aide receptive to trial of nectar thickened mixed berry nepro. Noted pt recently initiated on marinol appetite stimulant 3/21       Current Weight: 265.8 lbs (3/6), 263.4 lbs (3/20), weight stable however pt noted with 4+ luann foot/hand as well as non-pitting generalized edema   % Weight Change    Pertinent Medications: MEDICATIONS  (STANDING):  ascorbic acid 500 milliGRAM(s) Oral daily  aspirin enteric coated 81 milliGRAM(s) Oral daily  atorvastatin 10 milliGRAM(s) Oral at bedtime  bisacodyl 5 milliGRAM(s) Oral at bedtime  calcium carbonate 1250 mG  + Vitamin D (OsCal 500 + D) 1 Tablet(s) Oral three times a day  cholecalciferol 1000 Unit(s) Oral daily  cycloSPORINE  (SandIMMUNE)  Solution 75 milliGRAM(s) Oral two times a day  DAPTOmycin IVPB 700 milliGRAM(s) IV Intermittent every 48 hours  dextrose 5% + sodium chloride 0.9%. 1000 milliLiter(s) (75 mL/Hr) IV Continuous <Continuous>  dronabinol 2.5 milliGRAM(s) Oral two times a day  escitalopram 5 milliGRAM(s) Oral daily  folic acid 1 milliGRAM(s) Oral daily  heparin  Injectable 5000 Unit(s) SubCutaneous every 8 hours  metoprolol tartrate 12.5 milliGRAM(s) Oral two times a day  nystatin    Suspension 396811 Unit(s) Swish and Swallow three times a day  polyethylene glycol 3350 17 Gram(s) Oral daily  vitamin B complex with vitamin C 1 Tablet(s) Oral daily    MEDICATIONS  (PRN):  acetaminophen   Tablet .. 650 milliGRAM(s) Oral every 6 hours PRN Mild Pain (1 - 3)  acetaminophen   Tablet .. 650 milliGRAM(s) Oral every 6 hours PRN Temp greater or equal to 38C (100.4F)  melatonin 3 milliGRAM(s) Oral at bedtime PRN Insomnia  oxyCODONE    5 mG/acetaminophen 325 mG 1 Tablet(s) Oral every 6 hours PRN Severe Pain (7 - 10)    Pertinent Labs:  03-22 Na144 mmol/L Glu 96 mg/dL K+ 4.8 mmol/L Cr  1.81 mg/dL<H> BUN 68 mg/dL<H> 03-18 Phos 4.8 mg/dL<H> 03-18 Alb 0.7 g/dL<L>      Skin: Skin noted with unstageable pressure injury to L hip fold, L hip and suspected DTI to R heel per nursing flowsheets, pt also noted with additional wounds to thighs and flank-wound care following    Estimated Needs:   [x ] no change since previous assessment  [ ] recalculated:       Previous Nutrition Diagnosis:     [x ] Malnutrition (severe)         Nutrition Diagnosis is [x ] ongoing  [ ] resolved [ ] not applicable          New Nutrition Diagnosis: [ x] not applicable         Interventions:     Recommend    1. Diet texture deferred to medical team/pt/family wishes, currently dysphagia 1 diet with nectar thickened liquids, no plan for formal swallow evaluation at this time, family do not wish to pursue enteral nutrition at this time. Noted potassium improved today, given poor po intake may consider liberalizing no concentrated potassium restriction-per discussion with NP, will re-assess dependent on potassium levels  2. Continue Ensure enlive 2 daily, monitor pt amenability to change to nepro, aide will try today.  3. Consider adding daily multivitamin to further aid in wound healing      Monitoring and Evaluation:     [x ] PO intake [x ] Tolerance to diet prescription [ x] weights [ x] follow up per protocol    [ ] other: Pt seen for malnutrition follow up. Pt admitted c cellulitis, pt c multiple pressure injuries, bacteremia, iron deficiency anemia, YONATAN (noted Nephrology following- noted Cr rising and pt now c hyperkalemia, as per Nephrology, likely steroid related at this time and to be treated medically). Noted pt S/P RRT 3/18 for hypotension and hypoglycemia.  Family meeting held 3/18, [facility] discussions ongoing Noted pt was made DNR/DNI recently.     Source: Patient [ ]    Family [ ]     other [ x] Aide at bedside, pt sleeping at time of visit     Diet : Dysphagia 1 diet with nectar consistency liquids, no concentrated potassium, ensure enlive 2 daily       Per aide pt with no complaints of N+V, pt with very small bowel movement yesterday. Pt seen by GI reported with coughing associated with feeding by aide, Family does not wish for feeding tube placement, no plan for formal swallowing evaluation at this time per NP, diet changed to dysphagia 1 diet with nectar thickened liquids, per aide pt dislikes thickened liquids, no reported coughing during breakfast today.      PO intake: Per aide pt with ongoing poor po intake, pt will takes spoons of meals even those which pt requests herself, pt had a few spoons of ensure shake today and farina, pt will request items such as cream soups like broccoli and cheddar which family will provide and pt will take little of. Aide receptive to trial of nectar thickened mixed berry nepro. Noted pt recently initiated on marinol appetite stimulant 3/21       Current Weight: 265.8 lbs (3/6), 263.4 lbs (3/20), weight stable however pt noted with 4+ luann foot/hand as well as non-pitting generalized edema   % Weight Change    Pertinent Medications: MEDICATIONS  (STANDING):  ascorbic acid 500 milliGRAM(s) Oral daily  aspirin enteric coated 81 milliGRAM(s) Oral daily  atorvastatin 10 milliGRAM(s) Oral at bedtime  bisacodyl 5 milliGRAM(s) Oral at bedtime  calcium carbonate 1250 mG  + Vitamin D (OsCal 500 + D) 1 Tablet(s) Oral three times a day  cholecalciferol 1000 Unit(s) Oral daily  cycloSPORINE  (SandIMMUNE)  Solution 75 milliGRAM(s) Oral two times a day  DAPTOmycin IVPB 700 milliGRAM(s) IV Intermittent every 48 hours  dextrose 5% + sodium chloride 0.9%. 1000 milliLiter(s) (75 mL/Hr) IV Continuous <Continuous>  dronabinol 2.5 milliGRAM(s) Oral two times a day  escitalopram 5 milliGRAM(s) Oral daily  folic acid 1 milliGRAM(s) Oral daily  heparin  Injectable 5000 Unit(s) SubCutaneous every 8 hours  metoprolol tartrate 12.5 milliGRAM(s) Oral two times a day  nystatin    Suspension [id number] Unit(s) Swish and Swallow three times a day  polyethylene glycol 3350 17 Gram(s) Oral daily  vitamin B complex with vitamin C 1 Tablet(s) Oral daily    MEDICATIONS  (PRN):  acetaminophen   Tablet .. 650 milliGRAM(s) Oral every 6 hours PRN Mild Pain (1 - 3)  acetaminophen   Tablet .. 650 milliGRAM(s) Oral every 6 hours PRN Temp greater or equal to 38C (100.4F)  melatonin 3 milliGRAM(s) Oral at bedtime PRN Insomnia  oxyCODONE    5 mG/acetaminophen 325 mG 1 Tablet(s) Oral every 6 hours PRN Severe Pain (7 - 10)    Pertinent Labs:  03-22 Na144 mmol/L Glu 96 mg/dL K+ 4.8 mmol/L Cr  1.81 mg/dL<H> BUN 68 mg/dL<H> 03-18 Phos 4.8 mg/dL<H> 03-18 Alb 0.7 g/dL<L>      Skin: Skin noted with unstageable pressure injury to L hip fold, L hip and suspected DTI to R heel per nursing flowsheets, pt also noted with additional wounds to thighs and flank-wound care following    Estimated Needs:   [x ] no change since previous assessment  [ ] recalculated:       Previous Nutrition Diagnosis:     [x ] Malnutrition (severe)         Nutrition Diagnosis is [x ] ongoing  [ ] resolved [ ] not applicable          New Nutrition Diagnosis: [ x] not applicable         Interventions:     Recommend    1. Diet texture deferred to medical team/pt/family wishes, currently dysphagia 1 diet with nectar thickened liquids, no plan for formal swallow evaluation at this time, family do not wish to pursue enteral nutrition at this time. Noted potassium improved today, given poor po intake may consider liberalizing no concentrated potassium restriction-per discussion with NP, will re-assess dependent on potassium levels  2. Continue Ensure enlive 2 daily, monitor pt amenability to change to nepro, aide will try today.  3. Consider adding daily multivitamin to further aid in wound healing   4. Continue to encourage po intake and obtain/honor food preferences as able-reviewed importance of adequate nutrition in setting of wound healing, reviewed offering foods with protein first and methods for adding additional calories to foods specifically adding fat/sugar to meals to increase calories      Monitoring and Evaluation:     [x ] PO intake [x ] Tolerance to diet prescription [ x] weights [ x] follow up per protocol    [ ] other:

## 2019-03-23 LAB
ANION GAP SERPL CALC-SCNC: 10 MMOL/L — SIGNIFICANT CHANGE UP (ref 5–17)
BUN SERPL-MCNC: 52 MG/DL — HIGH (ref 7–23)
CALCIUM SERPL-MCNC: 5.9 MG/DL — CRITICAL LOW (ref 8.4–10.5)
CHLORIDE SERPL-SCNC: 122 MMOL/L — HIGH (ref 96–108)
CK SERPL-CCNC: 42 U/L — SIGNIFICANT CHANGE UP (ref 25–170)
CO2 SERPL-SCNC: 16 MMOL/L — LOW (ref 22–31)
CREAT SERPL-MCNC: 1.22 MG/DL — SIGNIFICANT CHANGE UP (ref 0.5–1.3)
CYCLOSPORINE SER-MCNC: 137 NG/ML — LOW (ref 150–400)
GLUCOSE SERPL-MCNC: 76 MG/DL — SIGNIFICANT CHANGE UP (ref 70–99)
HCT VFR BLD CALC: 28.9 % — LOW (ref 34.5–45)
HGB BLD-MCNC: 8.3 G/DL — LOW (ref 11.5–15.5)
MCHC RBC-ENTMCNC: 27.2 PG — SIGNIFICANT CHANGE UP (ref 27–34)
MCHC RBC-ENTMCNC: 28.7 GM/DL — LOW (ref 32–36)
MCV RBC AUTO: 94.8 FL — SIGNIFICANT CHANGE UP (ref 80–100)
PLATELET # BLD AUTO: 228 K/UL — SIGNIFICANT CHANGE UP (ref 150–400)
POTASSIUM SERPL-MCNC: 4 MMOL/L — SIGNIFICANT CHANGE UP (ref 3.5–5.3)
POTASSIUM SERPL-SCNC: 4 MMOL/L — SIGNIFICANT CHANGE UP (ref 3.5–5.3)
RBC # BLD: 3.05 M/UL — LOW (ref 3.8–5.2)
RBC # FLD: 24.8 % — HIGH (ref 10.3–14.5)
SODIUM SERPL-SCNC: 148 MMOL/L — HIGH (ref 135–145)
WBC # BLD: 30.47 K/UL — HIGH (ref 3.8–10.5)
WBC # FLD AUTO: 30.47 K/UL — HIGH (ref 3.8–10.5)

## 2019-03-23 RX ORDER — OXYCODONE AND ACETAMINOPHEN 5; 325 MG/1; MG/1
1 TABLET ORAL EVERY 6 HOURS
Qty: 0 | Refills: 0 | Status: DISCONTINUED | OUTPATIENT
Start: 2019-03-23 | End: 2019-03-25

## 2019-03-23 RX ORDER — CALCIUM GLUCONATE 100 MG/ML
1 VIAL (ML) INTRAVENOUS ONCE
Qty: 0 | Refills: 0 | Status: COMPLETED | OUTPATIENT
Start: 2019-03-23 | End: 2019-03-23

## 2019-03-23 RX ADMIN — Medication 400 GRAM(S): at 10:44

## 2019-03-23 RX ADMIN — OXYCODONE AND ACETAMINOPHEN 1 TABLET(S): 5; 325 TABLET ORAL at 08:01

## 2019-03-23 RX ADMIN — OXYCODONE AND ACETAMINOPHEN 1 TABLET(S): 5; 325 TABLET ORAL at 09:00

## 2019-03-23 RX ADMIN — HEPARIN SODIUM 5000 UNIT(S): 5000 INJECTION INTRAVENOUS; SUBCUTANEOUS at 22:13

## 2019-03-23 RX ADMIN — SODIUM CHLORIDE 75 MILLILITER(S): 9 INJECTION, SOLUTION INTRAVENOUS at 07:11

## 2019-03-23 RX ADMIN — HEPARIN SODIUM 5000 UNIT(S): 5000 INJECTION INTRAVENOUS; SUBCUTANEOUS at 16:23

## 2019-03-23 RX ADMIN — HEPARIN SODIUM 5000 UNIT(S): 5000 INJECTION INTRAVENOUS; SUBCUTANEOUS at 06:38

## 2019-03-23 NOTE — PROGRESS NOTE ADULT - ASSESSMENT
79 yo F hx PMR, Pyoderma Gangrenosum (prior steroids, now on cyclosporine), s/p TAVR and explant of infected R Knee for MRSA (doxy suppression, spacer) admitted with fever to 102 and leukocytosis with bacteremia. Renal following for YONATAN on CKD4    #  YONATAN on CKD4- - improving Serum Creatinine level today, 1.2  #hypocalcemia- s chong today 5.9, last serum alb avilable 03/18 was 0.7= correct serum chong 8.4, iv chong gluconate 1 gm   # Hyperkalemia: improved  #Hypotension, - off Hypertension meds  # cellulitis- abxs, pain control per medicine    DNR, poor prognosis

## 2019-03-23 NOTE — PROGRESS NOTE ADULT - SUBJECTIVE AND OBJECTIVE BOX
Moaning in bed  Does not answer my ?s    Vital Signs Last 24 Hrs  T(C): 36.9 (23 Mar 2019 00:09), Max: 36.9 (22 Mar 2019 16:52)  T(F): 98.5 (23 Mar 2019 00:09), Max: 98.5 (23 Mar 2019 00:09)  HR: 92 (23 Mar 2019 00:09) (92 - 94)  BP: 108/70 (23 Mar 2019 00:09) (108/70 - 112/70)  BP(mean): --  RR: 16 (23 Mar 2019 00:09) (16 - 16)  SpO2: 99% (23 Mar 2019 00:09) (99% - 99%)    GENERAL: lying in bed, confused, morbidly obese  HEAD:  Atraumatic, Normocephalic  EYES: EOMI, PERRLA, conjunctiva and sclera clear  NECK: Supple, No JVD  CHEST/LUNG: mild decrease breath sounds bilaterally; No wheeze   HEART: Regular rate and rhythm; No murmurs, rubs, or gallops  ABDOMEN: Soft, Nontender, Nondistended; Bowel sounds present  BACK: multiple wounds/pressure ulcers all over her entire back   Neuro: awake alert   EXTREMITIES:  2+ Peripheral Pulses, No clubbing, cyanosis.  2+ nonpitting edema LE, UE trace edema stable  Back: sacral and thigh wound. +wound vac in place  SKIN: No rashes or lesions    LABS:                        8.4    28.1  )-----------( 217      ( 22 Mar 2019 06:19 )             27.9     03-23    148<H>  |  122<H>  |  52<H>  ----------------------------<  76  4.0   |  16<L>  |  1.22    Ca    5.9<LL>      23 Mar 2019 07:44        CAPILLARY BLOOD GLUCOSE

## 2019-03-23 NOTE — PROVIDER CONTACT NOTE (CRITICAL VALUE NOTIFICATION) - NAME OF MD/NP/PA/DO NOTIFIED:
MARSHA Melissa
Augusto Paredes NP
Cher Franklin
Kimberlyn, NP
Leda LARSON
MARSHA Cash
MARSHA samuels made aware
NP Jonathan
Nancy Cash NP
Nancy Cash NP

## 2019-03-23 NOTE — PROVIDER CONTACT NOTE (CRITICAL VALUE NOTIFICATION) - ACTION/TREATMENT ORDERED:
MARSHA Whalen made aware. Will continue to monitor.
np to review
pending orders
will monitor orders
will monitor orders
No new orders
pt on vancomycin and ceftriaxone IV no new orders at this time
will monitor orders
next dose on hold.
NP Nancy Cash to order repeat lab work

## 2019-03-23 NOTE — PROGRESS NOTE ADULT - ASSESSMENT
78 F PMH of CAD s/p stent to LAD, AS s/p TAVR, PPM, DVT (Jan '18) on coumadin, IVC filter, pyoderma gangrenosum, hx MRSA infections, and total knee replacement c/b joint infections s/p I&D explantation and spacer placement followed by spacer removal and static spacer placement with joint fusion due to infection of initial spacer with complex wound closure by plastic surgery, admission here in November 2018 for hip and thigh wounds, now presenting with confusion and chills at home associated with poor appetite and confusion x 1 week and not eating x 2 days. Noted with fever here, YONATAN on labwork. R knee evaluated by orthopedic surgery.	    Problem/Plan - 1:  ·  Problem: Bacteremia.  Plan: 2' S epidermidis  Daptomycin last day is 3/28/19; PICC placed 3/6/19  blood cultures from 2/28, 3/9 are negative.  ID f/u appreciated  TTE without discrete vegetation, too high-risk for ERIKA and would not   CT chest 2/11 revealing b/l pleural effusions  Poor prognosis overall. Extensive palliative involvement is appreciated      Problem/Plan - 2:  ·  Problem: Iron deficiency anemia.  Plan: in setting of CK3  s/p venofer 200 mg on 3/7 and 3/8  s/p 1 unit of pRBC on 3/9 and likely another one on 3/15  hgb stable    Problem/Plan - 3:  ·  Problem: Pyoderma gangrenosum.  Plan: c/w yclosporine dose adjusted to 100 mg BID based on levels  completed chronic steroids taper for pyoderma, (she was on Prednisone 5 mg qdaily, tapered off on admission)  Per orthopedic surgery can be OOB primarily to wheelchair and can 50% weight  wound care consult and wound vac management appreciated.   Appreciate derm, will maintain vigilance for any new/expanding purpura    Problem/Plan - 4:  ·  Problem: Altered mental status, unspecified altered mental status type.  Plan: likely 2/2 metabolic encephalopathy given fever and concern for cellulitis  mental status remains waxing and waning at best  c/w percocet q6hr PRN.  Per the son, she gets q4hr sometime at home if pain is severe.   she is on home gabapentin 300 mg BID, neurontin completely off for now due to lethargy.     Problem/Plan - 5:  ·  Problem: Acute kidney failure.  Plan: likely cardiorenal. intravascular depleted but grossly volume overloaded.   poor PO intake.   renal appreciated  Holding off diuretics for now  going back and forth with intravascular depletion s/p IVF vs. gross extravascular volume overload  guarded prognosis.     Problem/Plan - 6:  Problem: Coronary artery disease involving native coronary artery of native heart without angina pectoris. Plan: Cont Atovastatin 10 mg QHS.    Problem/Plan - 7:  ·  Problem: Aortic stenosis, severe.  Plan: Pt with severe AS, not candidate for intervention indefinitely    Problem/Plan - 8:  ·  Problem: Gastroesophageal reflux disease, esophagitis presence not specified.  Plan: Protonix.     Problem/Plan - 9:  ·  Problem: Depression, unspecified depression type.    c/w low dose Lexapro 5 mg.     Problem/Plan - 10:  Problem: Need for prophylactic measure. Plan; SC heparin 5000 units q8h.    Problem/Plan - 11:  ·  Problem: poor prognosis. Dr. Palumbo had numerous discussions with different family members.  MOLST forms sign. the  [place] agreed to DNR/DNI, verbal consent.   The son Thony agrees with conservative approach. Palliative family meeting held 3/19/19.  Appreciate palliative intervention.  See their extensive notes detailing the meeting and followup conversations.  Family not agreeing to PEG or hospice at this point.

## 2019-03-23 NOTE — PROGRESS NOTE ADULT - SUBJECTIVE AND OBJECTIVE BOX
[facility] - Ans Serv [phone], Office [phone]  Dr Daniels/Dr Castillo / Dr Basil RAMIREZ /Dr Dez westbrook /Dr JAMES Zamudio/Dr Reuben Irizarry/Dr Natalio Hays /Dr BOB Forbes  _______________________________________________________________________________________________    seen and examined today for renal failure   Interval :  Serum Creatinine improving  VITALS:  T(F): 98.4 (03-23-19 @ 09:00), Max: 98.5 (03-23-19 @ 00:09)  HR: 101 (03-23-19 @ 09:00)  BP: 99/65 (03-23-19 @ 09:00)  RR: 17 (03-23-19 @ 09:00)  SpO2: 98% (03-23-19 @ 09:00)      03-22 @ 07:01  -  03-23 @ 07:00  --------------------------------------------------------  IN: 1150 mL / OUT: 0 mL / NET: 1150 mL    03-23 @ 07:01  -  03-23 @ 11:38  --------------------------------------------------------  IN: 200 mL / OUT: 0 mL / NET: 200 mL    Physical Exam :-  Constitutional: NAD  Neck: Supple.  Respiratory: Bilateral equal breath sounds, no Crackles present.  Cardiovascular: S1, S2 normal, positive Murmur  Gastrointestinal: Bowel Sounds present, soft, non tender.  Extremities: + Edema Feet  Neurological: Alert  Psychiatric: Normal mood, normal affect  Data:-  Allergies :   amoxicillin (Other)  IV Contrast (Flushing (Skin); Nausea)    Hospital Medications:   MEDICATIONS  (STANDING):  ascorbic acid 500 milliGRAM(s) Oral daily  aspirin enteric coated 81 milliGRAM(s) Oral daily  atorvastatin 10 milliGRAM(s) Oral at bedtime  bisacodyl 5 milliGRAM(s) Oral at bedtime  calcium carbonate 1250 mG  + Vitamin D (OsCal 500 + D) 1 Tablet(s) Oral three times a day  cholecalciferol 1000 Unit(s) Oral daily  cycloSPORINE  (SandIMMUNE)  Solution 75 milliGRAM(s) Oral two times a day  DAPTOmycin IVPB 700 milliGRAM(s) IV Intermittent every 48 hours  dextrose 5% + sodium chloride 0.9%. 1000 milliLiter(s) (75 mL/Hr) IV Continuous <Continuous>  dronabinol 2.5 milliGRAM(s) Oral two times a day  escitalopram 5 milliGRAM(s) Oral daily  folic acid 1 milliGRAM(s) Oral daily  heparin  Injectable 5000 Unit(s) SubCutaneous every 8 hours  metoprolol tartrate 12.5 milliGRAM(s) Oral two times a day  multivitamin/minerals 1 Tablet(s) Oral daily  nystatin    Suspension 890100 Unit(s) Swish and Swallow three times a day  polyethylene glycol 3350 17 Gram(s) Oral daily  vitamin B complex with vitamin C 1 Tablet(s) Oral daily    03-23    148<H>  |  122<H>  |  52<H>  ----------------------------<  76  4.0   |  16<L>  |  1.22    Ca    5.9<LL>      23 Mar 2019 07:44      Creatinine Trend: 1.22 <--, 1.81 <--, 2.33 <--, 2.81 <--, 2.79 <--, 2.94 <--, 2.49 <--, 2.49 <--, 2.23 <--                        8.3    30.47 )-----------( 228      ( 23 Mar 2019 09:54 )             28.9

## 2019-03-24 LAB
ANION GAP SERPL CALC-SCNC: 14 MMOL/L — SIGNIFICANT CHANGE UP (ref 5–17)
ANISOCYTOSIS BLD QL: SIGNIFICANT CHANGE UP
BASOPHILS # BLD AUTO: 0.06 K/UL — SIGNIFICANT CHANGE UP (ref 0–0.2)
BASOPHILS NFR BLD AUTO: 0.2 % — SIGNIFICANT CHANGE UP (ref 0–2)
BUN SERPL-MCNC: 60 MG/DL — HIGH (ref 7–23)
CALCIUM SERPL-MCNC: 7.5 MG/DL — LOW (ref 8.4–10.5)
CHLORIDE SERPL-SCNC: 117 MMOL/L — HIGH (ref 96–108)
CO2 SERPL-SCNC: 17 MMOL/L — LOW (ref 22–31)
CREAT SERPL-MCNC: 1.43 MG/DL — HIGH (ref 0.5–1.3)
CYCLOSPORINE SER-MCNC: 124 NG/ML — LOW (ref 150–400)
EOSINOPHIL # BLD AUTO: 0.04 K/UL — SIGNIFICANT CHANGE UP (ref 0–0.5)
EOSINOPHIL NFR BLD AUTO: 0.1 % — SIGNIFICANT CHANGE UP (ref 0–6)
GLUCOSE SERPL-MCNC: 71 MG/DL — SIGNIFICANT CHANGE UP (ref 70–99)
HCT VFR BLD CALC: 27.6 % — LOW (ref 34.5–45)
HGB BLD-MCNC: 8 G/DL — LOW (ref 11.5–15.5)
IMM GRANULOCYTES NFR BLD AUTO: 1.4 % — SIGNIFICANT CHANGE UP (ref 0–1.5)
LYMPHOCYTES # BLD AUTO: 2.35 K/UL — SIGNIFICANT CHANGE UP (ref 1–3.3)
LYMPHOCYTES # BLD AUTO: 7.6 % — LOW (ref 13–44)
MACROCYTES BLD QL: SLIGHT — SIGNIFICANT CHANGE UP
MANUAL SMEAR VERIFICATION: SIGNIFICANT CHANGE UP
MCHC RBC-ENTMCNC: 27.6 PG — SIGNIFICANT CHANGE UP (ref 27–34)
MCHC RBC-ENTMCNC: 29 GM/DL — LOW (ref 32–36)
MCV RBC AUTO: 95.2 FL — SIGNIFICANT CHANGE UP (ref 80–100)
MONOCYTES # BLD AUTO: 0.59 K/UL — SIGNIFICANT CHANGE UP (ref 0–0.9)
MONOCYTES NFR BLD AUTO: 1.9 % — LOW (ref 2–14)
NEUTROPHILS # BLD AUTO: 27.56 K/UL — HIGH (ref 1.8–7.4)
NEUTROPHILS NFR BLD AUTO: 88.8 % — HIGH (ref 43–77)
PLAT MORPH BLD: NORMAL — SIGNIFICANT CHANGE UP
PLATELET # BLD AUTO: 195 K/UL — SIGNIFICANT CHANGE UP (ref 150–400)
POLYCHROMASIA BLD QL SMEAR: SLIGHT — SIGNIFICANT CHANGE UP
POTASSIUM SERPL-MCNC: 4.8 MMOL/L — SIGNIFICANT CHANGE UP (ref 3.5–5.3)
POTASSIUM SERPL-SCNC: 4.8 MMOL/L — SIGNIFICANT CHANGE UP (ref 3.5–5.3)
RBC # BLD: 2.9 M/UL — LOW (ref 3.8–5.2)
RBC # FLD: 25.2 % — HIGH (ref 10.3–14.5)
RBC BLD AUTO: ABNORMAL
SODIUM SERPL-SCNC: 148 MMOL/L — HIGH (ref 135–145)
WBC # BLD: 31.04 K/UL — HIGH (ref 3.8–10.5)
WBC # FLD AUTO: 31.04 K/UL — HIGH (ref 3.8–10.5)

## 2019-03-24 RX ORDER — ACETAMINOPHEN 500 MG
1000 TABLET ORAL ONCE
Qty: 0 | Refills: 0 | Status: COMPLETED | OUTPATIENT
Start: 2019-03-24 | End: 2019-03-24

## 2019-03-24 RX ADMIN — DAPTOMYCIN 128 MILLIGRAM(S): 500 INJECTION, POWDER, LYOPHILIZED, FOR SOLUTION INTRAVENOUS at 06:52

## 2019-03-24 RX ADMIN — HEPARIN SODIUM 5000 UNIT(S): 5000 INJECTION INTRAVENOUS; SUBCUTANEOUS at 15:27

## 2019-03-24 RX ADMIN — SODIUM CHLORIDE 75 MILLILITER(S): 9 INJECTION, SOLUTION INTRAVENOUS at 06:22

## 2019-03-24 RX ADMIN — HEPARIN SODIUM 5000 UNIT(S): 5000 INJECTION INTRAVENOUS; SUBCUTANEOUS at 06:22

## 2019-03-24 RX ADMIN — HEPARIN SODIUM 5000 UNIT(S): 5000 INJECTION INTRAVENOUS; SUBCUTANEOUS at 23:00

## 2019-03-24 RX ADMIN — Medication 400 MILLIGRAM(S): at 15:21

## 2019-03-24 NOTE — PROGRESS NOTE ADULT - ASSESSMENT
78 F PMH of CAD s/p stent to LAD, AS s/p TAVR, PPM, DVT (Jan '18) on coumadin, IVC filter, pyoderma gangrenosum, hx MRSA infections, and total knee replacement c/b joint infections s/p I&D explantation and spacer placement followed by spacer removal and static spacer placement with joint fusion due to infection of initial spacer with complex wound closure by plastic surgery, admission here in November 2018 for hip and thigh wounds, now presenting with confusion and chills at home associated with poor appetite and confusion x 1 week and not eating x 2 days. Noted with fever here, YONATAN on labwork. R knee evaluated by orthopedic surgery.	      Problem/Plan - 1:  ·  Problem: Bacteremia.  Plan: 2' S epidermidis  Daptomycin last day is 3/28/19; PICC placed 3/6/19  blood cultures from 2/28, 3/9 are negative.  ID f/u appreciated  TTE without discrete vegetations, too high-risk for ERIKA and would not   CT chest 2/11 revealing b/l pleural effusions  Poor prognosis overall. Extensive palliative involvement is appreciated      Problem/Plan - 2:  ·  Problem: Iron deficiency anemia.  Plan: in setting of CK3  s/p venofer 200 mg on 3/7 and 3/8  s/p 1 unit of pRBC on 3/9 and likely another one on 3/15  hgb stable      Problem/Plan - 3:  ·  Problem: Pyoderma gangrenosum.  Plan: c/w yclosporine dose adjusted to 100 mg BID based on levels  completed chronic steroids taper for pyoderma, (she was on Prednisone 5 mg qdaily, tapered off on admission)  Per orthopedic surgery can be OOB primarily to wheelchair and can 50% weight  wound care consult and wound vac management appreciated.   Appreciate derm, will maintain vigilance for any new/expanding purpura      Problem/Plan - 4:  ·  Problem: Altered mental status, unspecified altered mental status type.  Plan: likely 2/2 metabolic encephalopathy given fever and concern for cellulitis  mental status remains waxing and waning at best  c/w percocet q6hr PRN.  Per the son, she gets q4hr sometime at home if pain is severe.   she is on home gabapentin 300 mg BID, neurontin completely off for now due to lethargy.       Problem/Plan - 5:  ·  Problem: Acute kidney failure.  Plan: likely cardiorenal. intravascular depleted but grossly volume overloaded.   poor PO intake.   renal appreciated  Holding off diuretics for now  going back and forth with intravascular depletion s/p IVF vs. gross extravascular volume overload  guarded prognosis.   not candidate for HD      Problem/Plan - 6:  Problem: Coronary artery disease involving native coronary artery of native heart without angina pectoris. Plan: Cont Atovastatin 10 mg QHS.      Problem/Plan - 7:  ·  Problem: Aortic stenosis, severe.  Plan: Pt with severe AS, not candidate for intervention indefinitely      Problem/Plan - 8:  ·  Problem: Gastroesophageal reflux disease, esophagitis presence not specified.  Plan: Protonix.       Problem/Plan - 9:  ·  Problem: Depression, unspecified depression type.    c/w low dose Lexapro 5 mg.       Problem/Plan - 10:  Problem: Need for prophylactic measure. Plan; SC heparin 5000 units q8h.    Problem/Plan - 11:  ·  Problem: poor prognosis. Dr. Palumbo had numerous discussions with different family members.  MOLST forms sign. the  [place] agreed to DNR/DNI, verbal consent.   The son Thony agrees with conservative approach. Palliative family meeting held 3/19/19.  Appreciate palliative intervention.  See their extensive notes detailing the meeting and followup conversations.  Family not agreeing to PEG or hospice at this point.

## 2019-03-24 NOTE — PROGRESS NOTE ADULT - SUBJECTIVE AND OBJECTIVE BOX
Her mental status continues to wax and wane    Vital Signs Last 24 Hrs  T(C): 36.9 (24 Mar 2019 08:00), Max: 37.1 (23 Mar 2019 16:31)  T(F): 98.4 (24 Mar 2019 08:00), Max: 98.8 (23 Mar 2019 16:31)  HR: 104 (24 Mar 2019 08:00) (99 - 104)  BP: 90/58 (24 Mar 2019 08:00) (90/58 - 103/66)  BP(mean): --  RR: 18 (24 Mar 2019 08:00) (18 - 18)  SpO2: 98% (24 Mar 2019 08:00) (97% - 98%)    GENERAL: lying in bed, confused, morbidly obese  HEAD:  Atraumatic, Normocephalic  EYES: EOMI, PERRLA, conjunctiva and sclera clear  NECK: Supple, No JVD  CHEST/LUNG: mild decrease breath sounds bilaterally; No wheeze   HEART: Regular rate and rhythm; No murmurs, rubs, or gallops  ABDOMEN: Soft, Nontender, Nondistended; Bowel sounds present  BACK: multiple wounds/pressure ulcers all over her entire back   Neuro: awake alert   EXTREMITIES:  2+ Peripheral Pulses, No clubbing, cyanosis.  2+ nonpitting edema LE, UE trace edema stable  Back: sacral and thigh wound. +wound vac in place  SKIN: No rashes or lesions    LABS:                        8.0    31.04 )-----------( 195      ( 24 Mar 2019 09:56 )             27.6     03-24    148<H>  |  117<H>  |  60<H>  ----------------------------<  71  4.8   |  17<L>  |  1.43<H>    Ca    7.5<L>      24 Mar 2019 07:35        CAPILLARY BLOOD GLUCOSE        CARDIAC MARKERS ( 23 Mar 2019 09:49 )  x     / x     / 42 U/L / x     / x

## 2019-03-25 VITALS — SYSTOLIC BLOOD PRESSURE: 100 MMHG | HEART RATE: 90 BPM | DIASTOLIC BLOOD PRESSURE: 70 MMHG

## 2019-03-25 LAB
ANION GAP SERPL CALC-SCNC: 18 MMOL/L — HIGH (ref 5–17)
BASOPHILS # BLD AUTO: 0 K/UL — SIGNIFICANT CHANGE UP (ref 0–0.2)
BASOPHILS NFR BLD AUTO: 0 % — SIGNIFICANT CHANGE UP (ref 0–2)
BUN SERPL-MCNC: 62 MG/DL — HIGH (ref 7–23)
CALCIUM SERPL-MCNC: 7.7 MG/DL — LOW (ref 8.4–10.5)
CHLORIDE SERPL-SCNC: 121 MMOL/L — HIGH (ref 96–108)
CO2 SERPL-SCNC: 13 MMOL/L — LOW (ref 22–31)
CREAT SERPL-MCNC: 1.8 MG/DL — HIGH (ref 0.5–1.3)
EOSINOPHIL # BLD AUTO: 0 K/UL — SIGNIFICANT CHANGE UP (ref 0–0.5)
EOSINOPHIL NFR BLD AUTO: 0 % — SIGNIFICANT CHANGE UP (ref 0–6)
GLUCOSE SERPL-MCNC: 62 MG/DL — LOW (ref 70–99)
HCT VFR BLD CALC: 30.8 % — LOW (ref 34.5–45)
HGB BLD-MCNC: 8.7 G/DL — LOW (ref 11.5–15.5)
LYMPHOCYTES # BLD AUTO: 0.91 K/UL — LOW (ref 1–3.3)
LYMPHOCYTES # BLD AUTO: 2.6 % — LOW (ref 13–44)
MCHC RBC-ENTMCNC: 26.9 PG — LOW (ref 27–34)
MCHC RBC-ENTMCNC: 28.2 GM/DL — LOW (ref 32–36)
MCV RBC AUTO: 95.4 FL — SIGNIFICANT CHANGE UP (ref 80–100)
MONOCYTES # BLD AUTO: 0 K/UL — SIGNIFICANT CHANGE UP (ref 0–0.9)
MONOCYTES NFR BLD AUTO: 0 % — LOW (ref 2–14)
NEUTROPHILS # BLD AUTO: 34.09 K/UL — HIGH (ref 1.8–7.4)
NEUTROPHILS NFR BLD AUTO: 90.4 % — HIGH (ref 43–77)
PLATELET # BLD AUTO: 193 K/UL — SIGNIFICANT CHANGE UP (ref 150–400)
POTASSIUM SERPL-MCNC: 5 MMOL/L — SIGNIFICANT CHANGE UP (ref 3.5–5.3)
POTASSIUM SERPL-SCNC: 5 MMOL/L — SIGNIFICANT CHANGE UP (ref 3.5–5.3)
RBC # BLD: 3.23 M/UL — LOW (ref 3.8–5.2)
RBC # FLD: 25.5 % — HIGH (ref 10.3–14.5)
SODIUM SERPL-SCNC: 152 MMOL/L — HIGH (ref 135–145)
WBC # BLD: 35 K/UL — HIGH (ref 3.8–10.5)
WBC # FLD AUTO: 35 K/UL — HIGH (ref 3.8–10.5)

## 2019-03-25 RX ORDER — SODIUM CHLORIDE 9 MG/ML
1000 INJECTION, SOLUTION INTRAVENOUS
Qty: 0 | Refills: 0 | Status: DISCONTINUED | OUTPATIENT
Start: 2019-03-25 | End: 2019-03-25

## 2019-03-25 RX ORDER — MORPHINE SULFATE 50 MG/1
0.5 CAPSULE, EXTENDED RELEASE ORAL EVERY 4 HOURS
Qty: 0 | Refills: 0 | Status: DISCONTINUED | OUTPATIENT
Start: 2019-03-25 | End: 2019-03-25

## 2019-03-25 RX ADMIN — SODIUM CHLORIDE 75 MILLILITER(S): 9 INJECTION, SOLUTION INTRAVENOUS at 17:36

## 2019-03-25 RX ADMIN — HEPARIN SODIUM 5000 UNIT(S): 5000 INJECTION INTRAVENOUS; SUBCUTANEOUS at 22:30

## 2019-03-25 RX ADMIN — HEPARIN SODIUM 5000 UNIT(S): 5000 INJECTION INTRAVENOUS; SUBCUTANEOUS at 14:00

## 2019-03-25 RX ADMIN — HEPARIN SODIUM 5000 UNIT(S): 5000 INJECTION INTRAVENOUS; SUBCUTANEOUS at 06:38

## 2019-03-25 RX ADMIN — SODIUM CHLORIDE 75 MILLILITER(S): 9 INJECTION, SOLUTION INTRAVENOUS at 23:28

## 2019-03-25 RX ADMIN — SODIUM CHLORIDE 75 MILLILITER(S): 9 INJECTION, SOLUTION INTRAVENOUS at 07:37

## 2019-03-25 RX ADMIN — SODIUM CHLORIDE 75 MILLILITER(S): 9 INJECTION, SOLUTION INTRAVENOUS at 14:01

## 2019-03-25 NOTE — PROGRESS NOTE ADULT - SUBJECTIVE AND OBJECTIVE BOX
Patient seen and examined  lethargic       amoxicillin (Other)  IV Contrast (Flushing (Skin); Nausea)    Hospital Medications:   MEDICATIONS  (STANDING):  ascorbic acid 500 milliGRAM(s) Oral daily  aspirin enteric coated 81 milliGRAM(s) Oral daily  atorvastatin 10 milliGRAM(s) Oral at bedtime  bisacodyl 5 milliGRAM(s) Oral at bedtime  calcium carbonate 1250 mG  + Vitamin D (OsCal 500 + D) 1 Tablet(s) Oral three times a day  cholecalciferol 1000 Unit(s) Oral daily  cycloSPORINE  (SandIMMUNE)  Solution 75 milliGRAM(s) Oral two times a day  DAPTOmycin IVPB 700 milliGRAM(s) IV Intermittent every 48 hours  dextrose 5% + sodium chloride 0.45%. 1000 milliLiter(s) (75 mL/Hr) IV Continuous <Continuous>  dronabinol 2.5 milliGRAM(s) Oral two times a day  escitalopram 5 milliGRAM(s) Oral daily  folic acid 1 milliGRAM(s) Oral daily  heparin  Injectable 5000 Unit(s) SubCutaneous every 8 hours  metoprolol tartrate 12.5 milliGRAM(s) Oral two times a day  multivitamin/minerals 1 Tablet(s) Oral daily  nystatin    Suspension 823350 Unit(s) Swish and Swallow three times a day  polyethylene glycol 3350 17 Gram(s) Oral daily  vitamin B complex with vitamin C 1 Tablet(s) Oral daily      VITALS:  T(F): 97.3 (03-25-19 @ 07:31), Max: 98.2 (03-24-19 @ 16:29)  HR: 90 (03-25-19 @ 08:53)  BP: 100/70 (03-25-19 @ 08:53)  RR: 16 (03-25-19 @ 00:06)  SpO2: 100% (03-25-19 @ 00:06)  Wt(kg): --    03-24 @ 07:01  -  03-25 @ 07:00  --------------------------------------------------------  IN: 2150 mL / OUT: 250 mL / NET: 1900 mL    03-25 @ 07:01  -  03-25 @ 15:45  --------------------------------------------------------  IN: 260 mL / OUT: 0 mL / NET: 260 mL        PHYSICAL EXAM:  Constitutional: ill appearing  HEENT: anicteric sclera, oropharynx clear, MMM  Neck: No JVD  Respiratory: CTAB, no wheezes, rales or rhonchi  Cardiovascular: S1, S2, RRR  Gastrointestinal: BS+, soft, NT/ND  Extremities: 4+ edemaperipheral edema  Neurological: confused  Skin: Skin tears, necrotic skin lesions,     LABS:  03-25    152<H>  |  121<H>  |  62<H>  ----------------------------<  62<L>  5.0   |  13<L>  |  1.80<H>    Ca    7.7<L>      25 Mar 2019 06:55      Creatinine Trend: 1.80 <--, 1.43 <--, 1.22 <--, 1.81 <--, 2.33 <--, 2.81 <--, 2.79 <--, 2.94 <--                        8.7    35.00 )-----------( 193      ( 25 Mar 2019 08:58 )             30.8     Urine Studies:      RADIOLOGY & ADDITIONAL STUDIES:

## 2019-03-25 NOTE — PROGRESS NOTE ADULT - SUBJECTIVE AND OBJECTIVE BOX
CC: f/u for CoNS bacteremia    Patient has no new c/o, very weak    REVIEW OF SYSTEMS:  All other review of systems negative (Comprehensive ROS)- limited, d/w aide at bedside    Antimicrobials Day # 40  DAPTOmycin IVPB 700 milliGRAM(s) IV Intermittent every 48 hours  nystatin    Suspension 973794 Unit(s) Swish and Swallow three times a day    Medications Reviewed    Vital Signs Last 24 Hrs  T(C): 36.3 (25 Mar 2019 07:31), Max: 36.8 (24 Mar 2019 16:29)  T(F): 97.3 (25 Mar 2019 07:31), Max: 98.2 (24 Mar 2019 16:29)  HR: 90 (25 Mar 2019 08:53) (88 - 99)  BP: 100/70 (25 Mar 2019 08:53) (77/57 - 103/62)  BP(mean): --  RR: 16 (25 Mar 2019 00:06) (16 - 18)  SpO2: 100% (25 Mar 2019 00:06) (98% - 100%)    PHYSICAL EXAM:  General: more alert  Eyes:  anicteric, no conjunctival injection, no discharge  Oropharynx: no lesions or injection 	  Neck: supple, without adenopathy  Lungs: course  to auscultation  Heart: regular rate and rhythm; no murmur, rubs or gallops  Abdomen: soft, nondistended, nontender  Skin: no generalized rash  Extremities: R PICC site clean, dependent edema  Neurologic: generalized weakness    LAB RESULTS:                        8.7    35.00 )-----------( 193      ( 25 Mar 2019 08:58 )             30.8   03-25    152<H>  |  121<H>  |  62<H>  ----------------------------<  62<L>  5.0   |  13<L>  |  1.80<H>    Ca    7.7<L>      25 Mar 2019 06:55        Urine Microscopic-Add On (NC) (03.13.19 @ 21:41)    Epithelial Cells: >27 /HPF    Bacteria: Moderate    Hyaline Casts: 0 /LPF    White Blood Cell - Urine: 22 /HPF    Red Blood Cell - Urine: 8 /HPF    MICROBIOLOGY:  RECENT CULTURES:    Culture - Blood in AM (03.21.19 @ 14:32)    Specimen Source: .Blood Blood-Peripheral    Culture Results:   No growth to date.          RADIOLOGY REVIEWED:    < from: CT Chest No Cont (03.10.19 @ 20:01) >  1.  There are bilateral pleural effusions.  2.  Passive atelectasis from bilateral pleural effusions.      < end of copied text >

## 2019-03-25 NOTE — PROGRESS NOTE ADULT - PROVIDER SPECIALTY LIST ADULT
Gastroenterology
Infectious Disease
Internal Medicine
Nephrology
Orthopedics
Palliative Care
Wound Care
Internal Medicine
Nephrology
Infectious Disease
Infectious Disease
Nephrology
Internal Medicine
Nephrology
Internal Medicine

## 2019-03-25 NOTE — PROGRESS NOTE ADULT - ASSESSMENT
78 F PMH of CAD s/p stent to LAD, AS s/p TAVR, PPM, DVT (Jan '18) on coumadin, IVC filter, pyoderma gangrenosum, hx MRSA infections, and total knee replacement c/b joint infections s/p I&D explantation and spacer placement followed by spacer removal and static spacer placement with joint fusion due to infection of initial spacer with complex wound closure by plastic surgery, admission here in November 2018 for hip and thigh wounds, now presenting with confusion and chills at home associated with poor appetite and confusion x 1 week and not eating x 2 days. Noted with fever here, YONATAN on labwork. R knee evaluated by orthopedic surgery.	      Problem/Plan - 1:  ·  Problem: Bacteremia.  Plan: 2' S epidermidis  Daptomycin last day is 3/28/19; PICC placed 3/6/19  blood cultures from 2/28, 3/9 are negative.  ID f/u appreciated  TTE without discrete vegetations, too high-risk for ERIKA and would not   CT chest 2/11 revealing b/l pleural effusions  Poor prognosis overall. Extensive palliative involvement is appreciated      Problem/Plan - 2:  ·  Problem: Iron deficiency anemia.  Plan: in setting of CK3  s/p venofer 200 mg on 3/7 and 3/8  s/p 1 unit of pRBC on 3/9 and likely another one on 3/15  hgb stable      Problem/Plan - 3:  ·  Problem: Pyoderma gangrenosum.  Plan: c/w yclosporine dose adjusted to 100 mg BID based on levels  completed chronic steroids taper for pyoderma, (she was on Prednisone 5 mg qdaily, tapered off on admission)  Per orthopedic surgery can be OOB primarily to wheelchair and can 50% weight  wound care consult and wound vac management appreciated.   Appreciate derm, will maintain vigilance for any new/expanding purpura      Problem/Plan - 4:  ·  Problem: Altered mental status, unspecified altered mental status type.  Plan: likely 2/2 metabolic encephalopathy given fever and concern for cellulitis  mental status remains waxing and waning at best  c/w percocet q6hr PRN.  Per the son, she gets q4hr sometime at home if pain is severe.   she is on home gabapentin 300 mg BID, neurontin completely off for now due to lethargy.       Problem/Plan - 5:  ·  Problem: Acute kidney failure.  Plan: likely cardiorenal. intravascular depleted but grossly volume overloaded.   poor PO intake.   renal appreciated  Holding off diuretics for now; restarted D51/2 NS 75 cc/hr given rising Na and Cr  going back and forth with intravascular depletion s/p IVF vs. gross extravascular volume overload  guarded prognosis.   not candidate for HD      Problem/Plan - 6:  Problem: Coronary artery disease involving native coronary artery of native heart without angina pectoris. Plan: Cont Atovastatin 10 mg QHS.      Problem/Plan - 7:  ·  Problem: Aortic stenosis, severe.  Plan: Pt with severe AS, not candidate for intervention indefinitely      Problem/Plan - 8:  ·  Problem: Gastroesophageal reflux disease, esophagitis presence not specified.  Plan: Protonix.       Problem/Plan - 9:  ·  Problem: Depression, unspecified depression type.    c/w low dose Lexapro 5 mg.       Problem/Plan - 10:  Problem: Need for prophylactic measure. Plan; SC heparin 5000 units q8h.    Problem/Plan - 11:  ·  Problem: poor prognosis. Dr. Palumbo had numerous discussions with different family members.  MOLST forms sign. the  [place] agreed to DNR/DNI, verbal consent.   The son Thony agrees with conservative approach. Palliative family meeting held 3/19/19.  Appreciate palliative intervention.  See their extensive notes detailing the meeting and followup conversations.  Family not agreeing to PEG or hospice at this point.

## 2019-03-25 NOTE — PROGRESS NOTE ADULT - ASSESSMENT
79 yo F hx PMR, Pyoderma Gangrenosum (prior steroids, now on cyclosporine), s/p TAVR and explant of infected R Knee for MRSA (doxy suppression, spacer) admitted with fever to 102 and leukocytosis with bacteremia. Renal following for YONATAN on CKD4    #  YONATAN on CKD4- -cr rising , in setting of limited po intake, rising wbc, on abx- would check ua urine lytes  #metabolic acidosis- in setting of yonatan on ckd4; will d/c above fluids and start on hypotonic bicarb gtt- start d5 + 75meq hco3 @ 75cc; trend bicarb  # hypernatremia- start hypotonic solution as above ; monitor na  #hypocalcemia- corrected chong acceptable  # Hyperkalemia: improved  #Hypotension, - off Hypertension meds  # cellulitis- abxs, pain control per medicine    DNR, poor prognosis ;

## 2019-03-25 NOTE — PROGRESS NOTE ADULT - REASON FOR ADMISSION
Cellulitis

## 2019-03-25 NOTE — CHART NOTE - NSCHARTNOTEFT_GEN_A_CORE
RRT called for sob, blood pressure not obtainable. As per RRT team family requested no aggressive measures ( see RRT chart noted). Presently awaiting family arrival, Dr. Bray informed.

## 2019-03-25 NOTE — PROGRESS NOTE ADULT - SUBJECTIVE AND OBJECTIVE BOX
One of Health-care staff members has mentioned ethics consult which may be in order at this juncture given refusal of pt's HCP to embrace hospice care and to provide some relief and semblance of quality of life for this pt whose prognosis is extremely poor.      Vital Signs Last 24 Hrs  T(C): 36.3 (25 Mar 2019 07:31), Max: 36.8 (24 Mar 2019 16:29)  T(F): 97.3 (25 Mar 2019 07:31), Max: 98.2 (24 Mar 2019 16:29)  HR: 90 (25 Mar 2019 08:53) (88 - 99)  BP: 100/70 (25 Mar 2019 08:53) (77/57 - 103/62)  BP(mean): --  RR: 16 (25 Mar 2019 00:06) (16 - 18)  SpO2: 100% (25 Mar 2019 00:06) (98% - 100%)    GENERAL: lying in bed, confused, morbidly obese  HEAD:  Atraumatic, Normocephalic  EYES: EOMI, PERRLA, conjunctiva and sclera clear  NECK: Supple, No JVD  CHEST/LUNG: mild decrease breath sounds bilaterally; No wheeze   HEART: Regular rate and rhythm; No murmurs, rubs, or gallops  ABDOMEN: Soft, Nontender, Nondistended; Bowel sounds present  BACK: multiple wounds/pressure ulcers all over her entire back   Neuro: awake alert   EXTREMITIES:  2+ Peripheral Pulses, No clubbing, cyanosis.  2+ nonpitting edema LE, UE trace edema stable  Back: sacral and thigh wound. +wound vac in place  SKIN: No rashes or lesions    LABS:                        8.7    35.00 )-----------( 193      ( 25 Mar 2019 08:58 )             30.8     03-25    152<H>  |  121<H>  |  62<H>  ----------------------------<  62<L>  5.0   |  13<L>  |  1.80<H>    Ca    7.7<L>      25 Mar 2019 06:55        CAPILLARY BLOOD GLUCOSE

## 2019-03-25 NOTE — PROGRESS NOTE ADULT - ASSESSMENT
Patient with chronic right knee spacer infection, relapse of CoNS bacteremia, likely infected TAVR and/or PPM- on extended abx  Pyoderma gangrenosa with extensive necrotic tissue over her hips, buttocks and thighs, no surgical intervention  Steroids and cycloserine  Pt has been bedbound for months  Poor po intake, anasarca, renal dysfx  Afebrile, but leukocytosis cont to rise despite prolonged courses of abx    Plan:  Continue Dapto to q 48h- day 40/42  Palliative care  D/w aide at bedside Patient with chronic right knee spacer infection, relapse of CoNS bacteremia, likely infected TAVR and/or PPM- on extended abx  Pyoderma gangrenosa with extensive necrotic tissue over her hips, buttocks and thighs, no surgical intervention  Steroids and cycloserine  Pt has been bedbound for months  Poor po intake, anasarca, renal dysfx  Afebrile, but leukocytosis cont to rise despite prolonged courses of abx    Plan:  Continue Dapto to q 48h- day 40/42  Palliative care would be most logical and humane approach in a care of this pt  D/w aide at bedside

## 2019-03-25 NOTE — CHART NOTE - NSCHARTNOTEFT_GEN_A_CORE
RRT called for hypotension and agonal breath. Called HCP spouse Yonatan Rdz regarding current condition, and he stated that he did not want his wife to suffer any more. He does not want CPR, intubation, or any aggressive measures. MAR at bedside and aware.    Eduardo Gomez PGY3

## 2019-03-26 PROCEDURE — 84443 ASSAY THYROID STIM HORMONE: CPT

## 2019-03-26 PROCEDURE — 87040 BLOOD CULTURE FOR BACTERIA: CPT

## 2019-03-26 PROCEDURE — 88305 TISSUE EXAM BY PATHOLOGIST: CPT

## 2019-03-26 PROCEDURE — C1751: CPT

## 2019-03-26 PROCEDURE — 80053 COMPREHEN METABOLIC PANEL: CPT

## 2019-03-26 PROCEDURE — 93306 TTE W/DOPPLER COMPLETE: CPT

## 2019-03-26 PROCEDURE — 84300 ASSAY OF URINE SODIUM: CPT

## 2019-03-26 PROCEDURE — 86922 COMPATIBILITY TEST ANTIGLOB: CPT

## 2019-03-26 PROCEDURE — 84100 ASSAY OF PHOSPHORUS: CPT

## 2019-03-26 PROCEDURE — 86900 BLOOD TYPING SEROLOGIC ABO: CPT

## 2019-03-26 PROCEDURE — 73564 X-RAY EXAM KNEE 4 OR MORE: CPT

## 2019-03-26 PROCEDURE — 82962 GLUCOSE BLOOD TEST: CPT

## 2019-03-26 PROCEDURE — 36569 INSJ PICC 5 YR+ W/O IMAGING: CPT

## 2019-03-26 PROCEDURE — 92610 EVALUATE SWALLOWING FUNCTION: CPT

## 2019-03-26 PROCEDURE — 86850 RBC ANTIBODY SCREEN: CPT

## 2019-03-26 PROCEDURE — P9016: CPT

## 2019-03-26 PROCEDURE — 83735 ASSAY OF MAGNESIUM: CPT

## 2019-03-26 PROCEDURE — 76770 US EXAM ABDO BACK WALL COMP: CPT

## 2019-03-26 PROCEDURE — 71045 X-RAY EXAM CHEST 1 VIEW: CPT

## 2019-03-26 PROCEDURE — 84133 ASSAY OF URINE POTASSIUM: CPT

## 2019-03-26 PROCEDURE — 36430 TRANSFUSION BLD/BLD COMPNT: CPT

## 2019-03-26 PROCEDURE — 80048 BASIC METABOLIC PNL TOTAL CA: CPT

## 2019-03-26 PROCEDURE — 93005 ELECTROCARDIOGRAM TRACING: CPT

## 2019-03-26 PROCEDURE — P9040: CPT

## 2019-03-26 PROCEDURE — 82803 BLOOD GASES ANY COMBINATION: CPT

## 2019-03-26 PROCEDURE — 93970 EXTREMITY STUDY: CPT

## 2019-03-26 PROCEDURE — 84132 ASSAY OF SERUM POTASSIUM: CPT

## 2019-03-26 PROCEDURE — 82436 ASSAY OF URINE CHLORIDE: CPT

## 2019-03-26 PROCEDURE — 87150 DNA/RNA AMPLIFIED PROBE: CPT

## 2019-03-26 PROCEDURE — 82550 ASSAY OF CK (CPK): CPT

## 2019-03-26 PROCEDURE — 82947 ASSAY GLUCOSE BLOOD QUANT: CPT

## 2019-03-26 PROCEDURE — 84540 ASSAY OF URINE/UREA-N: CPT

## 2019-03-26 PROCEDURE — 86870 RBC ANTIBODY IDENTIFICATION: CPT

## 2019-03-26 PROCEDURE — 82746 ASSAY OF FOLIC ACID SERUM: CPT

## 2019-03-26 PROCEDURE — 83880 ASSAY OF NATRIURETIC PEPTIDE: CPT

## 2019-03-26 PROCEDURE — 82728 ASSAY OF FERRITIN: CPT

## 2019-03-26 PROCEDURE — 83605 ASSAY OF LACTIC ACID: CPT

## 2019-03-26 PROCEDURE — 85610 PROTHROMBIN TIME: CPT

## 2019-03-26 PROCEDURE — 87186 SC STD MICRODIL/AGAR DIL: CPT

## 2019-03-26 PROCEDURE — 83540 ASSAY OF IRON: CPT

## 2019-03-26 PROCEDURE — 85730 THROMBOPLASTIN TIME PARTIAL: CPT

## 2019-03-26 PROCEDURE — 99285 EMERGENCY DEPT VISIT HI MDM: CPT | Mod: 25

## 2019-03-26 PROCEDURE — 87086 URINE CULTURE/COLONY COUNT: CPT

## 2019-03-26 PROCEDURE — 86901 BLOOD TYPING SEROLOGIC RH(D): CPT

## 2019-03-26 PROCEDURE — 71250 CT THORAX DX C-: CPT

## 2019-03-26 PROCEDURE — 94640 AIRWAY INHALATION TREATMENT: CPT

## 2019-03-26 PROCEDURE — 80158 DRUG ASSAY CYCLOSPORINE: CPT

## 2019-03-26 PROCEDURE — 84295 ASSAY OF SERUM SODIUM: CPT

## 2019-03-26 PROCEDURE — 82607 VITAMIN B-12: CPT

## 2019-03-26 PROCEDURE — 83935 ASSAY OF URINE OSMOLALITY: CPT

## 2019-03-26 PROCEDURE — 82570 ASSAY OF URINE CREATININE: CPT

## 2019-03-26 PROCEDURE — 97605 NEG PRS WND THER DME<=50SQCM: CPT

## 2019-03-26 PROCEDURE — 84156 ASSAY OF PROTEIN URINE: CPT

## 2019-03-26 PROCEDURE — 82330 ASSAY OF CALCIUM: CPT

## 2019-03-26 PROCEDURE — 82435 ASSAY OF BLOOD CHLORIDE: CPT

## 2019-03-26 PROCEDURE — 85652 RBC SED RATE AUTOMATED: CPT

## 2019-03-26 PROCEDURE — 86902 BLOOD TYPE ANTIGEN DONOR EA: CPT

## 2019-03-26 PROCEDURE — 96375 TX/PRO/DX INJ NEW DRUG ADDON: CPT

## 2019-03-26 PROCEDURE — 86140 C-REACTIVE PROTEIN: CPT

## 2019-03-26 PROCEDURE — 96374 THER/PROPH/DIAG INJ IV PUSH: CPT

## 2019-03-26 PROCEDURE — 85027 COMPLETE CBC AUTOMATED: CPT

## 2019-03-26 PROCEDURE — 97602 WOUND(S) CARE NON-SELECTIVE: CPT

## 2019-03-26 PROCEDURE — 86160 COMPLEMENT ANTIGEN: CPT

## 2019-03-26 PROCEDURE — 81001 URINALYSIS AUTO W/SCOPE: CPT

## 2019-03-26 PROCEDURE — 85014 HEMATOCRIT: CPT

## 2019-03-26 PROCEDURE — 97606 NEG PRS WND THER DME>50 SQCM: CPT

## 2019-03-26 PROCEDURE — 80202 ASSAY OF VANCOMYCIN: CPT

## 2019-03-26 PROCEDURE — 86880 COOMBS TEST DIRECT: CPT

## 2019-03-26 PROCEDURE — 97162 PT EVAL MOD COMPLEX 30 MIN: CPT

## 2019-03-26 PROCEDURE — 83550 IRON BINDING TEST: CPT

## 2019-03-26 NOTE — CHART NOTE - NSCHARTNOTEFT_GEN_A_CORE
Called to pronounce pt. with cardiopulmonary arrest secondary to sepsis/bacteremia.  Pt. seen and evaluated at bedside.  Physical exam shows pt. in bed, unresponsive to verbal and tactile stimuli.  Skin pale, heart sounds absent, no spontaneous respirations, no palpable blood pressure or pulse, pupils fixed and dilated.  Pronounced at 2356.  Family at bedside and notified. Family decline autopsy. Not an ME case.  Attending Dr. Bray notified.    Shawn Vaughn NP  [id number] Called to pronounce pt. with cardiopulmonary arrest secondary to sepsis/bacteremia.  Pt. seen and evaluated at bedside.  Physical exam shows pt. in bed, unresponsive to verbal and tactile stimuli.  Skin pale, heart sounds absent, no spontaneous respirations, no palpable blood pressure or pulse, pupils fixed and dilated.  Pronounced at 2356.  Family at bedside and notified. Family decline autopsy. Not an ME case.  Attending Dr. Singh notified.    Shawn Vaughn NP  [id number]

## 2019-03-26 NOTE — DISCHARGE NOTE FOR THE EXPIRED PATIENT - HOSPITAL COURSE
78 F PMH of CAD s/p stent to LAD, AS s/p TAVR, PPM, DVT () on coumadin, IVC filter, pyoderma gangrenosum, hx MRSA infections, and total knee replacement c/b joint infections s/p I&D explantation and spacer placement followed by spacer removal and static spacer placement with joint fusion due to infection of initial spacer with complex wound closure by plastic surgery, admission here in 2018 for hip and thigh wounds, now presenting with confusion and chills at home associated with poor appetite and confusion x 1 week and not eating x 2 days. Noted with fever here, YONATAN on labwork. R knee evaluated by orthopedic surgery.	Pt admitted with sepsis/bacteremia with S epidermidis being on Daptomycin. Pt's prognosis was poor as pt was functionally declining. Pt made DNR/DNI, Family agreed upon comfort measures earlier today. Pt  at 2356. Family at bedside.

## 2019-03-29 PROBLEM — Z00.00 ENCOUNTER FOR PREVENTIVE HEALTH EXAMINATION: Noted: 2019-03-29

## 2019-04-16 NOTE — DIETITIAN INITIAL EVALUATION ADULT. - WEIGHT IN LBS
Dave Bueno is a 1 year old female who presents for her well child evaluation. Accompanied by father.  HPI (per nursing notes) reviewed, confirmed with patient/parent.    Concerns: See RN note.  1. Father is concerned for her foot pronation and would like to be proactive about this. He is interested in Orthopedics evaluation for this.     In the household no one is ill. In the household no one is on immunosuppressive drugs (e.g., prednisone); no one has cancer; and no one has an immunodeficiency condition including HIV/AIDS.    REVIEW OF SYSTEMS see HPI; otherwise denies HEENT, NECK, RESP, CARDIAC, GI, , NEURO or PSYCH Sx. Twin bed. Can draw [place], triangle, rectangle!    Past Medical History:   Diagnosis Date   • OM (otitis media), recurrent     PE tubes placed   • Pectus excavatum        Family History   Problem Relation Age of Onset   • NEGATIVE FAMILY HX OF Mother    • NEGATIVE FAMILY HX OF Father    • Diabetes Other         Paternal Great Grandfather   • Cancer Other    • Cancer Other         Paternal Greant Grandmother       SOCIAL HISTORY: lives with mom and dad. Mom is due with baby #2 in July! Dave attends .   Social History     Tobacco Use   • Smoking status: Never Smoker   • Smokeless tobacco: Never Used   Substance Use Topics   • Alcohol use: Not on file       PHYSICAL EXAM  GENERAL: Dave Bueno is an alert, vigorous female with appropriate behavior. She is in no acute distress.  SKIN: Skin color, texture, turgor are normal. There are no bruises, rashes or lesions. There are no signs of injury.  HEAD: The head is atraumatic and normocephalic.  EYES: The eyelids are normal. Both eyes open. The globes are symmetrical and normal. The conjunctivae appear normal.  The corneae are clear and of normal diameter. The corneal light reflex is symmetrical. There is no fixed deviation of gaze. The monocular cover test reveals no eye movement. Red reflexes are seen bilaterally; no dark spots are  visualized.  EARS: Dave Bueno responds to the spoken word. Exam of the ears reveals the pinnae to be normal. The external auditory canals are clear.   Right PE Tube in EAC, TM wnl   Left PE tube appears to be extruding, TM wnl.   NOSE: There is no nasal flaring.  THROAT: The oropharynx is normal.  TEETH: The teeth are normal.  NECK: The neck is normal. The thyroid is not palpably enlarged.  LYMPH NODES: There are no palpably enlarged lymph nodes in the neck or groin.  TRUNK AND THORAX: There are no lesions on the trunk. The thorax is symmetrical and longer in the transverse than in the AP diameter. There are no retractions.  LUNGS: The lung fields are clear to auscultation.  HEART: The precordium is quiet. The heart rhythm is grossly regular. S1 and S2 are normal. The heart tones are strong. There are no murmurs. The femoral pulses are normal.  ABDOMEN: There is not an umbilical hernia. The abdomen is flat, soft, and not tender. There are no masses. Neither the liver nor the spleen is enlarged. The bowel sounds are normal.  BACK: The back is normal.  GENITALIA: Dave has normal, Braulio stage I, female genitalia with no adhesions of the labia minora. There is no vaginal discharge. No inguinal herniae are present.  EXTREMITIES: The hip exam is normal. The legs are of equal length.  The foot exam reveals normal feet. There is no clubbing. There is no edema. Pronation R > L   NEUROLOGIC: Dave displays normal tone and strength throughout. She is walking and she has normal reflexes. Her speech includes three word sentences.      ASSESSMENT:   1. Well 1 year old female child  2. Pronation L > R    GUIDANCE:      Limit TV - DISCUSSED      Consistent discipline - DISCUSSED      Stuttering - DISCUSSED    PLAN:  --Orthopedics evaluation  --Dental visit suggested  --Return for next well child exam in 1 year.   [phone]

## 2019-05-02 NOTE — PROGRESS NOTE ADULT - SUBJECTIVE AND OBJECTIVE BOX
Plastic Surgery Progress Note    S: Patient seen and examined.  no new complaints.    Vital Signs Last 24 Hrs  T(C): 36.8 (25 Apr 2018 04:16), Max: 37.1 (24 Apr 2018 21:29)  T(F): 98.3 (25 Apr 2018 04:16), Max: 98.7 (24 Apr 2018 21:29)  HR: 87 (25 Apr 2018 04:16) (85 - 94)  BP: 135/72 (25 Apr 2018 04:16) (135/72 - 174/89)  BP(mean): --  RR: 18 (25 Apr 2018 04:16) (18 - 18)  SpO2: 96% (25 Apr 2018 04:16) (95% - 97%)  I&O's Detail    23 Apr 2018 07:01  -  24 Apr 2018 07:00  --------------------------------------------------------  IN:    IV PiggyBack: 100 mL    Oral Fluid: 960 mL  Total IN: 1060 mL    OUT:    Voided: 575 mL  Total OUT: 575 mL    Total NET: 485 mL      24 Apr 2018 07:01  -  25 Apr 2018 06:47  --------------------------------------------------------  IN:    IV PiggyBack: 100 mL    Oral Fluid: 240 mL  Total IN: 340 mL    OUT:    Voided: 825 mL  Total OUT: 825 mL    Total NET: -485 mL          Physical Exam:  General: Awake and alert, NAD  RLE: Knee eschar stable,  proximal incision intact.  most proximal wound evolving, recently covered with silvadene. Yes

## 2019-07-11 NOTE — DISCHARGE NOTE ADULT - PATIENT PORTAL LINK FT
does house chores and some walking “You can access the [employer] Patient Portal, offered by [facility], by registering with the following website: [email or web address]”

## 2019-07-23 NOTE — ADVANCED PRACTICE NURSE CONSULT - RECOMMEDATIONS
Deferred to plastic surgery service for the care and management of this wound on the knee.
Due to the patient's refusal and reluctance to move and allow for turning and positioning will provide an Envella bed to increase the offloading affect on pressure points and offer microclimate with her moisture issues due to her weight and body habitus.  Patient agreeable to the bed option but unable to agree to allow for turning and positioning.  Patient crying at the end of the visit that she "wanted to die".  Caregiver at bedside attempting to offer support but patient continued crying.
1.91

## 2019-07-24 NOTE — ED ADULT NURSE REASSESSMENT NOTE - NS ED NURSE REASSESS COMMENT FT1
Report received from JAS Lu. Pt AAOx4, NAD, resp nonlabored, skin warm/dry, resting in bed with family at bedside. Safety & comfort measures maintained. Will continue to reassess. awake/alert

## 2019-07-29 NOTE — ED ADULT TRIAGE NOTE - MODE OF ARRIVAL
1342: Patient arrives for diarrhea and lightheadedness. Patient had recent CT scan done of the sinuses by Dr. Iliana Norwood.
EMS

## 2019-07-31 NOTE — DIETITIAN INITIAL EVALUATION ADULT. - 25 CAL
8707
48F, pmh of PE, presenting with cough. patient was in [place] for two weeks, and developed a non-productive cough that continued to worsen. has cramping in both calves. reports some episodes of fevers. with previous PEs had cough that would not resolved. denies any chest pain, headache, changes in vision, nausea, vomiting, abdominal pain, pain or burning with urination.

## 2019-08-10 NOTE — DISCHARGE NOTE ADULT - %
Discharge instructions read and given to patient. Verbalized understanding. Discharged to home via wheelchair with personal belongings.  
Discussed feeding choice with mother.  Reviewed benefits of breastfeeding and risks of formula feeding. Mother states her intention is to both breastfeed and formula feed. Pt educated that she will be encouraged to bf exclusively in hospital with RN help and lactation if she wishes in order to help with latching infant and breastfeeding.   
ELEANOR Sanchez CNM notified of SVE. Orders to PO hydrate, ambulate after reactive tracing.   
65

## 2019-08-16 NOTE — DIETITIAN INITIAL EVALUATION ADULT. - ETIOLOGY
"MOVEMENT DISORDERS CLINIC    PATIENT: Dion Villavicencio    : 1964    MARY: 2019    REASON FOR VISIT: Right hemisphere monopolar review & initial deep brain stimulation (DBS) programming for Parkinson's disease.     HPI:  Mr. Dion Villavicencio is a 54 year old right - handed  male who came to the [facility] neurology clinic accompanied by his wife for DBS initial programming.  He is s/p Bilateral Subthalamic Nucleus (STN) DBS lead placement on 2019 and left chest infraclavicular Infinity 7 generator placement on 2019 by Dr. Cardozo.  I saw him last on 2019 for initial programming of the left STN DBS.    Since his last visit he has stopped taking short-acting Sinemet 25/100 mg (was taking 3 tablets/day.)  He has also reduced Rytary 145 mg // to 3/3/3/3/3.  He has stayed on the same dose of ropinirole and amantadine.    The day after his programming, he experienced significant dyskinesias affecting his right upper extremity and right leg.  This was managed by reducing the Rytary as above and reducing DBS current from 2.5 mA to 1.8 mA.  He reports that there is still residual slight intermittent dyskinesias in RUE that only comes when trying to use index finger to text, and when standing up from sitting position.     Overall he reports good benefit from the DBS and improving right body motor symptoms.  \"My writing is great,\" which he wasn't able to do for years.  He is able to wake up in the moring and start doing things without any difficulty.  He was able to shoot basketball easily.  He doesn't wear off.  He has to remind himself to take Rytary as the left body's DBS wasn't turned on. He reports sleeping well at night and leg muscles relaxed.  He is able to get up and walk to the bathroom without much difficulty.     He reports tremors in both legs when he eats protein and when increasing activity like exercising.    He has some anxiety, impatience, & irritability.  His wife has noticed more " distractibility     PD Medications 7:30am  11:30am  3:30pm  7:30pm  11:30pm   Ropinirole 2 mg   1    1    1   Rytary 145 mg  3 3 3 3 3   Amantadine 100 mg    1              Last antiparkinsonian dose taken:  Amantadine 2019 at 12 pm  Ropinirole & Rytary -- last night 8/15/2019 at 7 pm      PHYSICAL EXAM:  Vital Signs:  Blood pressure 128/88, pulse 98, weight 102.4 kg (225 lb 11.2 oz), SpO2 94 %. Body mass index is 34.13 kg/m .    General:  Mr. Villavicencio is a pleasant  male who is overweight sitting comfortably in the exam room without any distress.  Affect is appropriate.    MDS Part II  -- Over the last week -- 0: Normal -- 1: Slight -- 2: Mild -- 3: Moderate -- 4: Severe     2.1 Speech: 2   2.2 Saliva and droolin   2.3 Chewing and swallowin   2.4 Eating tasks: 1   2.5 Dressin   2.6 Hygiene:  1   2.7 Handwritin   2.8 Doing hobbies and other activities:  1   2.9 Turning in bed:  0   2.10 Tremor:  2   2.11 Getting out of bed/car/deep chair:   1   2.12 Walking and balance: 1   2.13 Freezin     Total:    12       MOVEMENT DISORDERS ASSESSMENT:  UPDRS 3   UPDRS Values 2019   Time: 7:25 AM   Medication Off   R Brain DBS: Off   L Brain DBS: On   Speech 0   Facial Expression 2   Rigidity Neck 2   Rigidity RUE 1   Rigidity LUE 2   Rigidity RLE 1   Rigidity LLE 2   Finger Taps R 0   Finger Taps L 0   Hand Mvt R 0   Hand Mvt L 1   Pron-/Supinate R 1   Pron-/Supinate L 1   Toe Tap R 1   Toe Tap L 1   Leg Agility R 0   Leg Agility L 1   Arise From Chair 0   Gait 0   Gait Freezing 0   Postural Stability 0   Posture 2   Global Spont Mvt 2   Postural Tremor RUE 1   Postural Tremor LUE 1   Kinetic Tremor RUE 0   Kinetic Tremor LUE 1   Rest Tremor RUE 1   Rest Tremor LUE 0   Rest Tremor RLE 2   Rest Tremor LLE 2   Rest Tremor Lip/Jaw 0   Rest Tremor Constancy 3   Total Right 8   Total Left 12   Axial Total 8   Total 31       DBS Interrogation & Analysis:  Implanted generator:  Left chest  "Infinity 7   Lead placement:  Bilateral Subthalamic Nucleus (STN)  Lead placement date:  7/9/2019  Generator placement date:  7/11/2019    Battery Service Life:  > 3.00 volts.    Left Brain  C +, 3abc -  Amplitude:  1.80 mA  PW:  60 msec  Rate:  130 Hz     Therapy impedance:  1212 Ohms    Patient :  Upper Limit: 2.50 mA  Lower Limit: 0.00 mA  Electrode Impedance Check:   Left Lead: No Problems Found.       Right Brain  Electrode Impedance Check:  Right Lead: No Problems Found.    See scanned report for impedance details.    MONOPOLAR REVIEW  Right Hemisphere    Contacts Amplitude PW   msec Rate  hertz Assessment Adverse Effect   Case+, 9- 0.0 60 130 LLE rest tremor = 2.  Rigidity in RUE = 2; RLE = 2.      0.5   No change.      1.0   No change.     1.5   LLE rest tremor = 2.  Rigidity in RUE = 1; RLE = 1.      2.0   No change.  Lt foot transient paresthesia.    2.5   LLE rest tremor = 1.  Rigidity in RUE = 1; RLE = 1.  Transient slight paresthesia in Lt bottom of foot.    3.0   No change.  Persistent paresthesia in Lt foot spreading from the bottom to the top and side of foot.    Case+, 10abc- 0.0 60 130 LLE rest tremor = 2.  Rigidity in RUE = 1; RLE = 2.      0.5   No change.      1.0   LLE rest tremor = 1.  Rigidity in RUE = 0; RLE = 1.      1.5   No change.      2.0   LLE rest tremor = 1.  Rigidity in RUE = 0; RLE = 0.      2.5   No change.      3.0   LLE is present intermittently.      3.5   No change.      4.0   No change.     4.5    Left hand posturing & tightness. Left calf tightness. Dysarthria.     Case+, 11abc- 0.0 60 130 LLE rest tremor = 2.  Rigidity in RUE = 1; RLE = 2.      0.5   I feel like my legs are heavy.  Like Lead metal weight.\"  He was asked to walk in the hallway.  \"Feels like walking through sand.  A lot of effort.\" Returned to room and waited 5 minutes.  He reports that the left leg below his knee felt like it's sleeping. He reported that the sensation is in both legs.      0.0   " "Same symptoms persisted.  He was told that his DBS is OFF.  Encouraged to make what he was feeling as his baseline.     1.0   LLE rest tremor = 1.  Rigidity in RUE = 1; RLE = 2.      1.5   LLE tremor barely noticeable.  No rigidity in Lt body.  He reported that the sensation he was feeling above felt better.  \"Legs don't feel heavy anymore.\"      2.0   No change.  Slight transient tingling.     2.5   No change.  No side effect.     3.0   No change.      3.5   No change.      4.0   No change.      4.5    Dysarthria.  Lt hand tingling.    Case+, 12- 0.0 60 130 He reported tightness in his knee & calf.  LLE rest tremor = 2.  Rigidity in RUE = 1; RLE = 2.      0.5   No change.      1.0    Tingling in bottom of feet.  \"Feels like it's going to sleep.\" Right side tight calf.\"  Then it extended to both legs.    0.0    He reported the same thing.    1.0    No changes.  \"It's as if my circulation is being cut off.  I don't feel my foot.\"    0.0    No changed.  Therefore, monopolar review stopped as pt seemed anxious and it was very difficult to differentiate between PD, anxiety, or stimulation induced side effect.        __  Monopolar survey showed good rigidity improvement in contact 10 & 11.  Rest tremor in LLE didn't completley resolve in any of the contacts above, but was improved in contact 9, 10, & 11.   Based on the monopolar review, patient was programmed as below: -       Right STN    C +, 10abc -  Amplitude:  2.0 mA  PW:  60 msec  Rate:  130 Hz    Therapy impedance:  1100 Ohms     Patient :  Upper Limit: 2.50 mA  Lower Limit: 1.50 mA       __  Pt took Rytary 145 mg 3 cap and Ropinirole 1 mg at 8:55 am.  He waited 45 minutes.  He had slight to mild body dyskinesias.     __ Went over patient controller with pt & his wife.  He is very comfortable using his controller independently.    __  No DBS programing changes were made to the Left STN.    The following instruction was given: -     __  Continue taking " Rytary 145 mg every 4 hours.  For your next dose, take 2 capsules instead of 3 capsules.  If you do well, continue taking 2 capsules every 4 hours.  If you have tremors and you feel off, you can increase bilateral DBS voltage to 2.2 volts, or 2.4 volts and see how you do.  You can increase the voltage to 2.5 volts on both sides if you continue to wear off.      __  Unless dyskinesias are bothersome, don't make too many changes per day.  If you have dyskinesias, you can lower the current until you feel comfortable or turn it off temporarily.     __  After a month, if you are still doing well, you can stop Amantadine 100 mg.     __  Please call if your symptoms worsen or with questions.    __  For your subsequent DBS programming visits, come ON medication.  Return on Sep 25th at 10:10 am.     The total amount of time spent with patient in DBS programming was 165 minutes.     DIETER Bacon, CNP  [facility]     6:56 AM  9:45 AM     increased nutrient demands for wound healing

## 2019-10-30 NOTE — PROGRESS NOTE ADULT - ATTENDING COMMENTS
For any question, call:  Cell # [phone]  Pager # [phone]  Callback # [phone] oriented to person, place, time and situation WFL

## 2019-11-12 NOTE — BEHAVIORAL HEALTH ASSESSMENT NOTE - ACCESS TO FIREARM
Test Reason : 

Blood Pressure : ***/*** mmHG

Vent. Rate : 073 BPM     Atrial Rate : 073 BPM

   P-R Int : 178 ms          QRS Dur : 080 ms

    QT Int : 386 ms       P-R-T Axes : 057 015 032 degrees

   QTc Int : 425 ms

 

NORMAL SINUS RHYTHM

NONSPECIFIC T WAVE ABNORMALITY

ABNORMAL ECG

NO PREVIOUS ECGS AVAILABLE

Confirmed by MD NALINI, RITO ([id number]) on 11/12/2019 9:55:40 AM

 

Referred By:             Confirmed By:RITO GAYLE MD No

## 2019-12-11 NOTE — CONSULT NOTE ADULT - ASSESSMENT
Results conveyed. A1c order placed.   Patient with infected right tkr s/p rudy, spacer for strept infection, had poor wound healing so returned 3 months after and had spacer exchange and complex wound closure with mrsa infection found. She is about 2 weeks into dapto and returns with fever, leukocytosis, ongoing pain and eschar of right knee wound and what appears to be a hematoma of the right thigh. INR has been up so that is good reason for the thigh findings. Must consider infected hematoma possible. UTI is possible. Pneumonia is possible given basilar rales. retroperitoneal hematoma possible .  PMR decompensation or adrenal insufficiency a consideration at well. Patient with infected right tkr s/p rudy, spacer for strept infection, had poor wound healing so returned 3 months after and had spacer exchange and complex wound closure with mrsa infection found. She is about 2 weeks into dapto and returns with fever, leukocytosis, ongoing pain and eschar of right knee wound and what appears to be a hematoma of the right thigh. INR has been up so that is good reason for the thigh findings. Must consider infected hematoma possible. UTI is possible. Pneumonia is possible given basilar rales. retroperitoneal hematoma possible .  PMR decompensation or adrenal insufficiency a consideration at well.    Pulmonary stable relative to prior admits

## 2020-02-08 NOTE — OCCUPATIONAL THERAPY INITIAL EVALUATION ADULT - IMPAIRMENTS CONTRIBUTING IMPAIRED BED MOBILITY, REHAB EVAL
pain/impaired balance General decreased strength/impaired balance/decreased flexibility/increased anxiety/impaired postural control/pain

## 2020-03-08 NOTE — PROGRESS NOTE ADULT - NSHPATTENDINGPLANDISCUSS_GEN_ALL_CORE
Patient c/o having a fever x 7 days, he was started on antibiotics by his pmd and has taken them for the past 3 days but the fever persists. patient

## 2020-08-23 NOTE — DISCHARGE NOTE ADULT - NS MD DC FALL RISK RISK
For information on Fall & Injury Prevention, visit [email or web address]
Andrei Stevenson(Attending)

## 2020-10-01 NOTE — CHART NOTE - NSCHARTNOTESELECT_GEN_ALL_CORE
DEATH NOTE    PATIENT NAME: Jules Hernandez  YOB: 1969  MEDICAL RECORD NO. [id number]  DATE: 10/1/notified (Name): none                                                         []  Per Elizabeth Kilgore MD  10/1/2020, 6:40 AM Nutrition Services

## 2020-10-07 NOTE — CONSULT NOTE ADULT - MUSCULOSKELETAL
negative Island Pedicle Flap-Requiring Vessel Identification Text: The defect edges were debeveled with a #15 scalpel blade.  Given the location of the defect, shape of the defect and the proximity to free margins an island pedicle advancement flap was deemed most appropriate.  Using a sterile surgical marker, an appropriate advancement flap was drawn, based on the axial vessel mentioned above, incorporating the defect, outlining the appropriate donor tissue and placing the expected incisions within the relaxed skin tension lines where possible.    The area thus outlined was incised deep to adipose tissue with a #15 scalpel blade.  The skin margins were undermined to an appropriate distance in all directions around the primary defect and laterally outward around the island pedicle utilizing iris scissors.  There was minimal undermining beneath the pedicle flap.

## 2020-11-13 NOTE — PHYSICAL THERAPY INITIAL EVALUATION ADULT - BED MOBILITY LIMITATIONS, REHAB EVAL
Detail Level: Detailed Depth Of Biopsy: dermis Was A Bandage Applied: Yes Size Of Lesion In Cm: 0.5 X Size Of Lesion In Cm: 0 Biopsy Type: H and E decreased ability to use legs for bridging/pushing/decreased ability to use arms for pushing/pulling Biopsy Method: 15 blade Anesthesia Type: 1% lidocaine with epinephrine Hemostasis: Aluminum Chloride Wound Care: Bacitracin Dressing: bandage Destruction After The Procedure: No Type Of Destruction Used: Curettage Curettage Text: The wound bed was treated with curettage after the biopsy was performed. Cryotherapy Text: The wound bed was treated with cryotherapy after the biopsy was performed. Electrodesiccation Text: The wound bed was treated with electrodesiccation after the biopsy was performed. Electrodesiccation And Curettage Text: The wound bed was treated with electrodesiccation and curettage after the biopsy was performed. Silver Nitrate Text: The wound bed was treated with silver nitrate after the biopsy was performed. Lab: [facility] Lab Facility: [address] [facility] Consent: Written consent was obtained and risks were reviewed including but not limited to scarring, infection, bleeding, scabbing, incomplete removal, nerve damage and allergy to anesthesia. Post-Care Instructions: I reviewed with the patient in detail post-care instructions. Patient is to keep the biopsy site dry overnight, and then apply bacitracin twice daily until healed. Patient may apply hydrogen peroxide soaks to remove any crusting. Notification Instructions: Patient will be notified of biopsy results. However, patient instructed to call the office if not contacted within 2 weeks. Billing Type: [employer] Information: Selecting Yes will display possible errors in your note based on the variables you have selected. This validation is only offered as a suggestion for you. PLEASE NOTE THAT THE VALIDATION TEXT WILL BE REMOVED WHEN YOU FINALIZE YOUR NOTE. IF YOU WANT TO FAX A PRELIMINARY NOTE YOU WILL NEED TO TOGGLE THIS TO 'NO' IF YOU DO NOT WANT IT IN YOUR FAXED NOTE. Size Of Lesion In Cm: 0.9 Lab: [id number] Lab Facility: [id number] Billing Type: Third-Party Bill

## 2020-11-17 NOTE — ED PROCEDURE NOTE - NS ED PROC PERFORMED BY1 FT
[facility]witness[facility] getting out of bed, unsure if LOC, arrives with c-collar in place. On oxygen. Patient denies pain. Noted with bruising to face.  Nose bleeding that wont stop

## 2020-12-09 NOTE — PROGRESS NOTE ADULT - MINUTES
From: Jennifer Calderon  To: Aaron Montague  Sent: 12/8/2020 12:08 AM CST  Subject: Non-Urgent Medical Question    I'd like to schedule a follow up appointment. My phone number [phone].   26

## 2021-01-04 NOTE — BRIEF OPERATIVE NOTE - POST-OP DX
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
[facility]

## 2021-04-29 NOTE — PROGRESS NOTE BEHAVIORAL HEALTH - MODIFICATIONS
well developed, well nourished , in no acute distress , ambulating without difficulty , normal communication ability see below

## 2021-05-01 NOTE — PROVIDER CONTACT NOTE (OTHER) - DATE AND TIME:
25-Dec-2017 22:00
28-Dec-2017 10:28
patient c/o of 3 episodes of hematuria not on any anticoagulants.

## 2021-05-04 NOTE — DISCHARGE NOTE ADULT - ADMISSION DATE +STARTOFVISITDATE
Statement Selected Implemented All Universal Safety Interventions:  [place] to call system. Call bell, personal items and telephone within reach. Instruct patient to call for assistance. Room bathroom lighting operational. Non-slip footwear when patient is off stretcher. Physically safe environment: no spills, clutter or unnecessary equipment. Stretcher in lowest position, wheels locked, appropriate side rails in place.

## 2021-06-06 NOTE — BEHAVIORAL HEALTH ASSESSMENT NOTE - NS ED [facility] MED ROS ENDOCRINE
Airway patent, TM normal bilaterally, normal appearing mouth, nose, throat, neck supple with full range of motion, no cervical adenopathy.
No complaints

## 2021-06-07 NOTE — DISCHARGE NOTE ADULT - WARFARIN/COUMADIN INCREASES YOUR RISK FOR BLEEDING. NOTIFY YOUR DOCTOR IF YOU SEE ANY BLEEDING OR ANY OF THE SIDE EFFECTS LISTED IN THE WARFARIN/COUMADIN BOOKLET. DIET AND MEDICATIONS CAN AFFECT THE PT/INR
Provider to order 1mg IVP dilaudid and will review chart and will see patient at bedside.
No new orders
Statement Selected

## 2021-07-21 NOTE — PROGRESS NOTE ADULT - PROBLEM SELECTOR PLAN 9
The patient is Watcher - Medium risk of patient condition declining or worsening    Shift Goals  Clinical Goals: Patient's pain will be controlled     Progress made toward(s) clinical / shift goals:    Patient is on comfort focused cares. Patient medicated with PRN pain medications per MAR.     Problem: Knowledge Deficit - Standard  Goal: Patient and family/care givers will demonstrate understanding of plan of care, disease process/condition, diagnostic tests and medications  Outcome: Progressing     Problem: Fall Risk  Goal: Patient will remain free from falls  Outcome: Progressing     Problem: Pain - Standard  Goal: Alleviation of pain or a reduction in pain to the patient’s comfort goal  Outcome: Progressing      abnormal CT--c/w aspiration--no longer an issue  Sp and Sw evaluation--aspiration precautions--ABX as per ID-total of 7 d overlap-completed -s/p daptomycin as of 5/5--now minocycline

## 2021-07-21 NOTE — PROGRESS NOTE ADULT - MINUTES
Assessment/Plan   Problem List Items Addressed This Visit        Other    Menorrhagia    Relevant Orders    CBC and differential    Gynecologic exam normal - Primary     Pap guidelines reviewed  Pap deferred secondary to negative pap and HPV in 2020 in this low risk patient  Reviewed dysmenorrhea and menorrhagia in length  Will plan to get repeat ultrasound to evaluate uterine fibroids  Will also plan blood work  Reviewed option of hysterectomy  Patient will consider  Reviewed persistent yeast infection  Likely caused by wet swim suits and increased sugar in diet  Will plan Diflucan 150mg x 2 doses  For prevention: Recommend acidophilus or yogurt daily, avoid irritating soaps or lotions, no tight fitted pants or underwear, avoid bubble baths, do not stay in wet swimsuit  Script for mammogram given  Return to office for annual or as needed  Other Visit Diagnoses     Encounter for screening mammogram for malignant neoplasm of breast        Relevant Orders    Mammo screening bilateral w 3d & cad    Uterine leiomyoma, unspecified location        Relevant Orders    US pelvis complete w transvaginal    Irregular menses        Relevant Orders    T4, free    TSH, 3rd generation    Fatigue, unspecified type        Relevant Orders    CBC and differential    Comprehensive metabolic panel    Screening for cholesterol level        Relevant Orders    Lipid panel    Yeast infection        Relevant Medications    fluconazole (DIFLUCAN) 150 mg tablet          Subjective:     Patient ID: Gardenia Lundborg is a 37 y o  y o  female  HPI  38 yo seen for annual exam  Hx of dysmenorrhea and bloating  Colonoscopy normal in 2019  Had pelvic ultrasound done 1/28/2020 which showed subserosal fibroid measuring 5 x 3 9 x 4 7cm  Another measuring up to 3 cm midline fundal  Right midline myometrial nodule measuring 3 2 cm  At that time surgical options were reviewed with patient   Patient was tolerating symptoms and was decided to repeat ultrasound in 6-12 months to evaluate growth of fibroids  Since June have been every 2 weeks  Heavy changing pad or tampon every 1-2 hrs  Reports recurrent yeast infection since vacation last month  Reports went to [facility] and was having more sugary drinks and sitting in wet swimsuit  Used Monistat afterwards with some relief but still feels like symptoms are waxing and waning  Itching and thick discharge  No odors, pelvic pain  Fever or chills  Last pap: 1/15/2020 NILM (-)HRHPV  Last mammogram: 1/26/2021 [facility] 1: negative  Last colonoscopy: 9/2019 normal    The following portions of the patient's history were reviewed and updated as appropriate:   She  has a past medical history of Anxiety, Obsessive compulsive disorder, and PMDD (premenstrual dysphoric disorder)  She   Patient Active Problem List    Diagnosis Date Noted    Gynecologic exam normal 07/21/2021    Routine gynecological examination 01/15/2020    Closed fracture of nasal bones 09/17/2019    Closed fracture of nasal septum 09/17/2019    Change in bowel habits 08/15/2019    Mammogram abnormal 06/09/2017    Poison ivy 06/16/2014    Depression 03/27/2013    Dysmenorrhea 03/27/2013    Menorrhagia 03/27/2013     She  has a past surgical history that includes Ankle surgery; Cervical biopsy w/ loop electrode excision (1995); Colposcopy (05/09/2003); Mouth surgery; and Other surgical history  Her family history includes Coronary artery disease in her paternal grandmother; Heart disease in her paternal grandmother; Other in her father; Stroke in her paternal grandmother  She  reports that she has never smoked  She has never used smokeless tobacco  She reports current alcohol use  She reports that she does not use drugs    Current Outpatient Medications   Medication Sig Dispense Refill    ALPRAZolam (XANAX) 0 5 mg tablet TK 1 T PO Q 8  H PRN  1    WELLBUTRIN  MG 24 hr tablet TK 1 T PO QD  5    fluconazole (DIFLUCAN) 150 mg tablet Take 1 tablet (150 mg total) by mouth every other day for 2 doses 2 tablet 0     No current facility-administered medications for this visit  She is allergic to iodinated diagnostic agents       Menstrual History:  OB History        1    Para   1    Term   1            AB        Living   1       SAB        TAB        Ectopic        Multiple        Live Births   1                  Patient's last menstrual period was 2021 (approximate)  Review of Systems   Constitutional: Negative for fatigue, fever and unexpected weight change  HENT: Negative for dental problem and sinus pressure  Eyes: Negative for visual disturbance  Respiratory: Negative for cough, shortness of breath and wheezing  Cardiovascular: Negative for chest pain  Gastrointestinal: Negative for abdominal pain, blood in stool, constipation, diarrhea, nausea and vomiting  Endocrine: Negative for polydipsia  Genitourinary: Negative for difficulty urinating, dyspareunia, dysuria, frequency, hematuria, pelvic pain and urgency  Musculoskeletal: Negative for arthralgias and back pain  Neurological: Negative for dizziness, seizures, light-headedness and headaches  Psychiatric/Behavioral: Negative for suicidal ideas  The patient is not nervous/anxious  Objective:  Vitals:    21 0806   BP: 108/74   BP Location: Left arm   Patient Position: Sitting   Cuff Size: Standard   Weight: 52 6 kg (116 lb)   Height: 5' 2" (1 575 m)      Physical Exam  Constitutional:       Appearance: Normal appearance  She is well-developed  Genitourinary:      Vulva, urethra and bladder normal       No vulval condylomata, lesion, tenderness, ulcerations, Bartholin's cyst or rash noted  No signs of labial injury  No labial fusion  No inguinal adenopathy present in the right or left side  No urethral prolapse, pain, swelling, tenderness, caruncle, mass or diverticulum present        Bladder is not distended or tender  No signs of injury or lesions in the vagina  Vaginal discharge (thick white discharge in vaginal vault  ) present  No vaginal erythema, tenderness or bleeding  No cervical motion tenderness, discharge or lesion  Uterus is enlarged (bulky)  Uterus is not tender, irregular or mobile  No uterine mass detected  No right or left adnexal mass present  Right adnexa not tender or full  Left adnexa not tender or full  HENT:      Head: Normocephalic and atraumatic  Neck:      Thyroid: No thyromegaly  Cardiovascular:      Rate and Rhythm: Normal rate and regular rhythm  Heart sounds: Normal heart sounds  No murmur heard  No friction rub  No gallop  Pulmonary:      Effort: Pulmonary effort is normal  No respiratory distress  Breath sounds: Normal breath sounds  No wheezing  Chest:      Breasts: Breasts are symmetrical          Right: Normal  No swelling, bleeding, inverted nipple, mass, nipple discharge, skin change or tenderness  Left: Normal  No swelling, bleeding, inverted nipple, mass, nipple discharge, skin change or tenderness  Abdominal:      General: There is no distension  Palpations: Abdomen is soft  There is no mass  Tenderness: There is no abdominal tenderness  There is no guarding or rebound  Hernia: No hernia is present  Lymphadenopathy:      Cervical: No cervical adenopathy  Upper Body:      Right upper body: No supraclavicular, axillary or pectoral adenopathy  Left upper body: No supraclavicular, axillary or pectoral adenopathy  Lower Body: No right inguinal adenopathy  No left inguinal adenopathy  Neurological:      Mental Status: She is alert and oriented to person, place, and time  Skin:     General: Skin is warm and dry     Psychiatric:         Behavior: Behavior normal  25

## 2021-08-07 NOTE — PROGRESS NOTE ADULT - ATTENDING COMMENTS
I agree with the above note on this patient. All pertinent films have been reviewed. Please refer to clinical documentation of the history, physical examinations, data summary, and both assessment and plan as documented above and with which I agree.    Nate Bass MD  Attending Orthopedic Surgeon negative - no nasal congestion

## 2021-08-16 NOTE — ED ADULT NURSE NOTE - NSSISCREENINGQ4_ED_A_ED
[None] : had no significant interval events [Heartburn] : denies heartburn [Nausea] : denies nausea [Vomiting] : denies vomiting [Diarrhea] : denies diarrhea [Constipation] : denies constipation [Yellow Skin Or Eyes (Jaundice)] : denies jaundice [Abdominal Pain] : denies abdominal pain [Abdominal Swelling] : denies abdominal swelling [Rectal Pain] : denies rectal pain [Wt Gain ___ Lbs] : no recent weight gain [Wt Loss ___ Lbs] : no recent weight loss [GERD] : no gastroesophageal reflux disease [Hiatus Hernia] : no hiatus hernia [Peptic Ulcer Disease] : no peptic ulcer disease [Pancreatitis] : no pancreatitis [Cholelithiasis] : no cholelithiasis [Kidney Stone] : no kidney stone [Inflammatory Bowel Disease] : no inflammatory bowel disease [Irritable Bowel Syndrome] : no irritable bowel syndrome [Diverticulitis] : no diverticulitis [Alcohol Abuse] : no alcohol abuse [Malignancy] : no malignancy [Abdominal Surgery] : no abdominal surgery [Appendectomy] : no appendectomy [Cholecystectomy] : no cholecystectomy [de-identified] : May 25, 2021 [de-identified] : Patient presents for follow-up evaluation of nonalcoholic fatty liver disease status post serologic evaluation in March of this year which showed normal liver chemistries and negative hepatitis B and other chronic liver disease serologies and an abdominal sonogram that showed hepatic steatosis without cirrhosis or ascites or liver lesion.  He presents today for follow-up evaluation of this problem having lost no significant amount of weight.  He does not abuse alcohol.  His GI review of systems is unremarkable. No

## 2021-08-22 NOTE — PROGRESS NOTE ADULT - PROBLEM SELECTOR PROBLEM 9
show
Pneumonia

## 2021-08-22 NOTE — PHYSICAL THERAPY INITIAL EVALUATION ADULT - GAIT DEVIATIONS NOTED, PT EVAL
97.7
decreased [place]/decreased step length/decreased weight-shifting ability/increased time in double stance/decreased stride length

## 2021-10-12 NOTE — PROGRESS NOTE ADULT - PROBLEM SELECTOR PLAN 9
impaired balance/decreased strength/pain Preoperative clearance abnormal CT--c/w aspiration  Sp and Sw evaluation--aspiration precautions--ABX as per ID-total of 7 d overlap-completed -now on daptomycin alone-as per ID until 5/4

## 2021-11-07 NOTE — PROGRESS NOTE ADULT - SUBJECTIVE AND OBJECTIVE BOX
Present, unchanged INTERVAL/OVERNIGHT EVENTS:     No overnight events, pt reports good pain control. No concerns this AM. She reports feeling "down" about not being able to get out of bed, and is hoping she can do so shortly.     HPI: 77y female s/p BL total knee replacement presented with infected right knee replacement. Blood cultures positive for strep sp. Underwent explantation of R knee hardware with washout and spacer placement. Patient had significant blood loss and required massive resuscitation. Patient extubated at conclusion of procedure. SICU consulted for continued post-operative resuscitation. Patient maintained on low dose phenylephrine gtt intra-operatively. Admitted to SICU for resuscitation and close monitoring. Transferred back to medicine service once stable on 12/26.     This is a 77y Female     INTERVAL/OVERNIGHT EVENTS:     MEDICATIONS  (STANDING):  aspirin enteric coated 81 milliGRAM(s) Oral daily  buDESOnide 160 MICROgram(s)/formoterol 4.5 MICROgram(s) Inhaler 2 Puff(s) Inhalation two times a day  cefTRIAXone   IVPB      cefTRIAXone   IVPB 2 Gram(s) IV Intermittent every 24 hours  clopidogrel Tablet 75 milliGRAM(s) Oral daily  docusate sodium 100 milliGRAM(s) Oral two times a day  heparin  Injectable 5000 Unit(s) SubCutaneous every 8 hours  pantoprazole    Tablet 40 milliGRAM(s) Oral before breakfast  predniSONE   Tablet 2.5 milliGRAM(s) Oral daily  simvastatin 20 milliGRAM(s) Oral at bedtime    MEDICATIONS  (PRN):  acetaminophen   Tablet. 650 milliGRAM(s) Oral every 6 hours PRN Mild Pain (1 - 3)  melatonin 6 milliGRAM(s) Oral at bedtime PRN Insomnia  nystatin Powder 1 Application(s) Topical two times a day PRN rash  oxyCODONE    5 mG/acetaminophen 325 mG 1 Tablet(s) Oral every 4 hours PRN Moderate Pain (4 - 6)  oxyCODONE    IR 10 milliGRAM(s) Oral two times a day PRN Severe Pain (7 - 10)    Allergies    No Known Allergies    Intolerances    IV Contrast (Flushing (Skin); Nausea)        All other systems reviewed and negative [ ]     VITAL SIGNS AND PHYSICAL EXAM:  Vital Signs Last 24 Hrs  T(C): 37.7 (30 Dec 2017 05:17), Max: 37.7 (29 Dec 2017 09:19)  T(F): 99.8 (30 Dec 2017 05:17), Max: 99.9 (29 Dec 2017 09:19)  HR: 82 (30 Dec 2017 05:17) (75 - 88)  BP: 144/50 (30 Dec 2017 05:17) (144/50 - 169/56)  BP(mean): --  RR: 17 (30 Dec 2017 05:17) (17 - 18)  SpO2: 96% (30 Dec 2017 05:17) (96% - 100%)  I&O's Summary    29 Dec 2017 07:01  -  30 Dec 2017 07:00  --------------------------------------------------------  IN: 0 mL / OUT: 370 mL / NET: -370 mL      Gen: No acute distress  HEENT: Normocephalic, atraumatic, extraocular movements intact, conjunctiva clear without ictuers  CV: Regular rate and rhythm, no murmurs, rubs, or gallops  Resp: Non-labored, clear to auscultation bilaterally  Abd: soft, non-tender, non-distended  Skin: no rash  Neuro: grossly normal   Ext: brace on RLE. peripheral pulses 1+ b/l    INTERVAL LAB RESULTS:                        8.9    9.05  )-----------( 280      ( 29 Dec 2017 07:30 )             27.6                         8.3    8.14  )-----------( 230      ( 28 Dec 2017 06:55 )             25.1                               140    |  107    |  28                  Calcium: 7.9   / iCa: x      (12-29 @ 07:30)    ----------------------------<  91        Magnesium: 2.0                              4.5     |  22     |  0.94             Phosphorous: 4.1            INTERVAL IMAGING STUDIES: INTERVAL/OVERNIGHT EVENTS:     No overnight events, pt reports good pain control. She reports feeling "very down, and is tearful this morning. She feels that she is losing hope that she will be able to get out of bed. Family will be visiting this afternoon and she is hoping that cheers her up.     HPI: 77y female s/p BL total knee replacement presented with infected right knee replacement. Blood cultures positive for strep sp. Underwent explantation of R knee hardware with washout and spacer placement. Patient had significant blood loss and required massive resuscitation. Patient extubated at conclusion of procedure. SICU consulted for continued post-operative resuscitation. Patient maintained on low dose phenylephrine gtt intra-operatively. Admitted to SICU for resuscitation and close monitoring. Transferred back to medicine service once stable on 12/26.     INTERVAL/OVERNIGHT EVENTS:     MEDICATIONS  (STANDING):  aspirin enteric coated 81 milliGRAM(s) Oral daily  buDESOnide 160 MICROgram(s)/formoterol 4.5 MICROgram(s) Inhaler 2 Puff(s) Inhalation two times a day  cefTRIAXone   IVPB      cefTRIAXone   IVPB 2 Gram(s) IV Intermittent every 24 hours  clopidogrel Tablet 75 milliGRAM(s) Oral daily  docusate sodium 100 milliGRAM(s) Oral two times a day  heparin  Injectable 5000 Unit(s) SubCutaneous every 8 hours  pantoprazole    Tablet 40 milliGRAM(s) Oral before breakfast  predniSONE   Tablet 2.5 milliGRAM(s) Oral daily  simvastatin 20 milliGRAM(s) Oral at bedtime    MEDICATIONS  (PRN):  acetaminophen   Tablet. 650 milliGRAM(s) Oral every 6 hours PRN Mild Pain (1 - 3)  melatonin 6 milliGRAM(s) Oral at bedtime PRN Insomnia  nystatin Powder 1 Application(s) Topical two times a day PRN rash  oxyCODONE    5 mG/acetaminophen 325 mG 1 Tablet(s) Oral every 4 hours PRN Moderate Pain (4 - 6)  oxyCODONE    IR 10 milliGRAM(s) Oral two times a day PRN Severe Pain (7 - 10)    Allergies    No Known Allergies    Intolerances    IV Contrast (Flushing (Skin); Nausea)        All other systems reviewed and negative [ ]     VITAL SIGNS AND PHYSICAL EXAM:  Vital Signs Last 24 Hrs  T(C): 37.7 (30 Dec 2017 05:17), Max: 37.7 (29 Dec 2017 09:19)  T(F): 99.8 (30 Dec 2017 05:17), Max: 99.9 (29 Dec 2017 09:19)  HR: 82 (30 Dec 2017 05:17) (75 - 88)  BP: 144/50 (30 Dec 2017 05:17) (144/50 - 169/56)  BP(mean): --  RR: 17 (30 Dec 2017 05:17) (17 - 18)  SpO2: 96% (30 Dec 2017 05:17) (96% - 100%)  I&O's Summary    29 Dec 2017 07:01  -  30 Dec 2017 07:00  --------------------------------------------------------  IN: 0 mL / OUT: 370 mL / NET: -370 mL      Gen: No acute distress  HEENT: Normocephalic, atraumatic, extraocular movements intact, conjunctiva clear without ictuers  CV: Regular rate and rhythm, no murmurs, rubs, or gallops  Resp: Non-labored, clear to auscultation bilaterally  Abd: soft, non-tender, non-distended  Skin: no rash  Neuro: grossly normal   Ext: brace on RLE. peripheral pulses 1+ b/l    INTERVAL LAB RESULTS:                        8.9    9.05  )-----------( 280      ( 29 Dec 2017 07:30 )             27.6                         8.3    8.14  )-----------( 230      ( 28 Dec 2017 06:55 )             25.1                               140    |  107    |  28                  Calcium: 7.9   / iCa: x      (12-29 @ 07:30)    ----------------------------<  91        Magnesium: 2.0                              4.5     |  22     |  0.94             Phosphorous: 4.1            INTERVAL IMAGING STUDIES: CC: F/u septic arthritis     INTERVAL/OVERNIGHT EVENTS:     No overnight events, pt reports good pain control. She reports feeling "very down, and is tearful this morning. She feels that she is losing hope that she will be able to get out of bed. Family will be visiting this afternoon and she is hoping that cheers her up. Denies any fevers, chills, or SOB    ROS: as per above     MEDICATIONS  (STANDING):  aspirin enteric coated 81 milliGRAM(s) Oral daily  buDESOnide 160 MICROgram(s)/formoterol 4.5 MICROgram(s) Inhaler 2 Puff(s) Inhalation two times a day  cefTRIAXone   IVPB      cefTRIAXone   IVPB 2 Gram(s) IV Intermittent every 24 hours  clopidogrel Tablet 75 milliGRAM(s) Oral daily  docusate sodium 100 milliGRAM(s) Oral two times a day  heparin  Injectable 5000 Unit(s) SubCutaneous every 8 hours  pantoprazole    Tablet 40 milliGRAM(s) Oral before breakfast  predniSONE   Tablet 2.5 milliGRAM(s) Oral daily  simvastatin 20 milliGRAM(s) Oral at bedtime    MEDICATIONS  (PRN):  acetaminophen   Tablet. 650 milliGRAM(s) Oral every 6 hours PRN Mild Pain (1 - 3)  melatonin 6 milliGRAM(s) Oral at bedtime PRN Insomnia  nystatin Powder 1 Application(s) Topical two times a day PRN rash  oxyCODONE    5 mG/acetaminophen 325 mG 1 Tablet(s) Oral every 4 hours PRN Moderate Pain (4 - 6)  oxyCODONE    IR 10 milliGRAM(s) Oral two times a day PRN Severe Pain (7 - 10)    Allergies    No Known Allergies    Intolerances    IV Contrast (Flushing (Skin); Nausea)        All other systems reviewed and negative [ ]     VITAL SIGNS AND PHYSICAL EXAM:  Vital Signs Last 24 Hrs  T(C): 37.7 (30 Dec 2017 05:17), Max: 37.7 (29 Dec 2017 09:19)  T(F): 99.8 (30 Dec 2017 05:17), Max: 99.9 (29 Dec 2017 09:19)  HR: 82 (30 Dec 2017 05:17) (75 - 88)  BP: 144/50 (30 Dec 2017 05:17) (144/50 - 169/56)  BP(mean): --  RR: 17 (30 Dec 2017 05:17) (17 - 18)  SpO2: 96% (30 Dec 2017 05:17) (96% - 100%)  I&O's Summary    29 Dec 2017 07:01  -  30 Dec 2017 07:00  --------------------------------------------------------  IN: 0 mL / OUT: 370 mL / NET: -370 mL      Gen: No acute distress  HEENT: Normocephalic, atraumatic, extraocular movements intact, conjunctiva clear without ictuers  CV: Regular rate and rhythm, no murmurs, rubs, or gallops  Resp: Non-labored, clear to auscultation bilaterally  Abd: soft, non-tender, non-distended  Skin: no rash  Neuro: grossly normal   Ext: brace on RLE. peripheral pulses 1+ b/l    INTERVAL LAB RESULTS:                        8.9    9.05  )-----------( 280      ( 29 Dec 2017 07:30 )             27.6                         8.3    8.14  )-----------( 230      ( 28 Dec 2017 06:55 )             25.1                               140    |  107    |  28                  Calcium: 7.9   / iCa: x      (12-29 @ 07:30)    ----------------------------<  91        Magnesium: 2.0                              4.5     |  22     |  0.94             Phosphorous: 4.1            INTERVAL IMAGING STUDIES:

## 2021-11-13 NOTE — CONSULT NOTE ADULT - PROBLEM SELECTOR RECOMMENDATION 9
Since patient is already on steroids, cosyntropin stim test can not be performed. I suggest to start slow steroid taper, once patient is off steroids, and if her clinical condition or lab results warrant, may get cosyntropin stim test. FU (2) more than 100 beats/min

## 2021-11-21 NOTE — ED ADULT NURSE NOTE - NSIMPLEMENTINTERV_GEN_ALL_ED
8
Implemented All Fall Risk Interventions:  [place] to call system. Call bell, personal items and telephone within reach. Instruct patient to call for assistance. Room bathroom lighting operational. Non-slip footwear when patient is off stretcher. Physically safe environment: no spills, clutter or unnecessary equipment. Stretcher in lowest position, wheels locked, appropriate side rails in place. Provide visual cue, wrist band, yellow gown, etc. Monitor gait and stability. Monitor for mental status changes and reorient to person, place, and time. Review medications for side effects contributing to fall risk. Reinforce activity limits and safety measures with patient and family.

## 2021-12-01 NOTE — DIETITIAN INITIAL EVALUATION ADULT. - PROBLEM SELECTOR PLAN 3
Patient has multiple wounds on R thigh as well as mid-abdomen. Seen by plastic surgery and not felt to be infected. Wound vac removed yesterday, will plan to b replaced in AM.  As per Plastics:  - continue vac to abdomen  - continue adaptic and vac to thigh  - if admitted, vacs are managed by PT (Yvonne). Please call to set up vac changes M/W/F.  - continue daily wet-to-dry dressing over open portion of knee wound  - please consult wound care (Dr. Joseph) for any other wounds Abnormal VS & WBC

## 2022-01-01 NOTE — PHYSICAL THERAPY INITIAL EVALUATION ADULT - REFERRING PHYSICIAN, REHAB EVAL
Dr. Singh. PT Eval & Tx: Increase activity as tolerated
Plastics
CCHD Screen [09-15]: Initial  Pre-Ductal SpO2(%): 100  Post-Ductal SpO2(%): 100  SpO2 Difference(Pre MINUS Post): 0  Extremities Used: Right Hand,Right Foot  Result: Passed  Follow up: Normal Screen- (No follow-up needed)

## 2022-01-19 NOTE — PROGRESS NOTE ADULT - ASSESSMENT
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     # Recent septic Rt TKR - abx spacer exchanged and plastics complex wound closure  # PMR on chronic steroids  # right knee eschar, right thigh necrosis and abd wound   #Multiple skin ulcers  # Anemia chronic disease    Plan:  wound care and wound vac per plastics and ortho  s/p multiple debridements  multiple ulcerated wounds: Derm c/s appreciated-f/u punch bx Cx-ngtd and f/u path  remains afebrile, cont suppressive minocycline,  ID f/u noted  appreciate heme recs, started on procrit for anemia of chronic disease  cont coumadin and asa  f/u plastics recs  appreciate pain management recs  PT OOB no knee motion, immobilizer if out of bed  bowel regimen None

## 2022-01-25 NOTE — PATIENT PROFILE ADULT. - NUTRITION PROFILE
----- Message from Negrita Flannery LMSW sent at 1/25/2022  1:25 PM CST -----  Patient needs a 2 week hospital follow up. Please contact patient directly with appt date and time.    
Phoned patient to schedule her post op appointment. Received no answer and left a message for her to call back to schedule.  
BMI greater than 40

## 2022-02-21 NOTE — PROGRESS NOTE ADULT - PROBLEM/PLAN-5
[FreeTextEntry1] : 12/9/19. Holter monitor. Sinus rhythm. Minimum HR: 65, maximum 8:120, average HR: 78 rate occasional PACs with risk or chills of 2-5 beats at a 162 beats per minute. Occasional to frequent unifocal PVCs. Symptomatic PVCs.\par \par 12/26/19. Technically difficult echocardiogram. EF: 55%. Mild MR, TR and AR.  Minimal PI. No pericardial effusion. Moderate PHTN.
DISPLAY PLAN FREE TEXT

## 2022-03-01 NOTE — ED ADULT NURSE NOTE - OBJECTIVE STATEMENT
Relief RN: Pt received to rm 25 aaox3 nonambulatory d/t knee pain c/o SOB x 2 days and sudden onset fever, knee pain this am.  Pt denies associated chest pain NVD painful/difficult urination or hematuria.  Pt p/w NAD breaths even unlabored, skin warm dry intact NSR on monitor VSS febrile in ED.  20 g IV access obtained at L.AC labs and meds as ordered.  To RVP Will monitor.
Rivaroxaban/Xarelto increases your risk for bleeding. Notify your doctor if you experience any of the following side effects: unusual bleeding or bruising, vomiting blood or coffee ground-like material, red or black stool, itching or hives, chest tightness, trouble breathing, swelling in your face or hands, swelling in your mouth or throat, change in how much or how often you urinate, red or brown urine, heavy menstrual or vaginal bleeding, or blistering or peeling skin. When Rivaroxaban/Xarelto is taken with other medicines, they can affect how it works. Taking other medications such as aspirin, antibiotics, antifungals, blood thinners, nonsteroidal anti-inflammatories, and medications that treat depression can increase your risk of bleeding. It is very important to tell your health care provider about all of the other medicines, including over-the-counter medications, herbs, and vitamins you are taking.  DO NOT start, stop, or change the dosage of any medicine, including over-the-counter medicines, vitamins, and herbal products without your doctor’s approval.  Any products containing aspirin or are nonsteroidal anti-inflammatories lessen the blood’s ability to form clots and adds to the effect of Rivaroxaban/Xarelto. Never take aspirin or medicines that contain aspirin without speaking to your doctor.

## 2022-06-14 NOTE — BEHAVIORAL HEALTH ASSESSMENT NOTE - NICOTINE
Operative Note    Date of Service: 6/14/2022    PREOPERATIVE DIAGNOSIS:   Malignant neoplasm of upper-outer quadrant of right breast in female, estrogen receptor positive (CMS/HCC) [C50.411, Z17.0]     POSTOPERATIVE DIAGNOSIS:   Same +1 positive sentinel lymph node 2 mm or possibly slightly greater in size---borderline 2.0 mm macro metastases with no radha extension in sentinel lymph node 2.    PROCEDURE PERFORMED:    Right breast needle localization, right breast and axillary sentinel node injection with methylene blue, identification, excision of 4 sentinel lymph nodes and frozen section, right breast lumpectomy and placement of 19 Hungarian Santy drain    SURGEON:   Alphonso Salgado MD  Assistant Surgeon:  Charlie Michel, nurse practitioner  ANESTHESIA:   CRNA: Staci Patrick CRNA; Uli Boyd CRNA  Anesthesiologist: Ajay Lowe MD  SRNA: Danny Willson General     POSITION:   Supine     INDICATIONS FOR PROCEDURE:  Stage I invasive ductal carcinoma 9 cm from the nipple with a [place] score of 5/9 and focal ductal carcinoma in-situ with no comedo necrosis    DESCRIPTION OF PROCEDURE:   The patient was placed on the operating table supine position placed under general anesthesia with laryngeal mask intubation.  She had previously been to the Radiology Department had a needle localization performed and sentinel node injection which showed a faint spot in the right axilla.  The patient's breast was injected with 5 cc of methylene blue and 5 cc of normal saline and a Marina areolar subdermal injection was carried out and massage of the breast was performed for 3 minutes.  She was prepped with ChloraPrep and draped in the usual sterile fashion with careful padding of her elbow and shoulder.  The right axilla was marked with a marking pen and the skin was injected with 0.25% Marcaine with epinephrine.  Incision curvilinear was made in the axillary fold and carried down through the skin subcutaneous tissue  through the clavipectoral fascia and the axilla contents were entered.  The Neoprobe was inserted into the axilla and there was a hot spot with a bluish colored lymph node and a 2nd lymph node immediately adjacent to this area and it had a count of 24,128.  This was completely excised and contained 2 lymph nodes adjacent to each other and sent for frozen section which was negative.  We found a sentinel lymph node 2 With a count of 6963 and this also had faint blue coloration and was sent for frozen section which revealed a borderline 2.0 mm  and possibly slightly greater than 2.0 mm but this was borderline upon discussion with the pathologist and was a borderline macro metastases but there was no axillary radha extension.  An additional area of palpable lymph node tissue was then removed but it had a radioactive count of only 127 and was not stained blue and was sent for frozen section and this was also negative for any micro or macro metastases.  There was only a single positive lymph node out of 4 lymph nodes removed and due to the fact that the patient was going to receive breast irradiation and now will require whole breast irradiation to include the axilla because of the single positive lymph node.  Bleeding points were coagulated with electrocautery and also a single figure-of-eight stitch to control hemostasis.  Attention was then turned to the lumpectomy.  A curvilinear incision was made in the upper outer quadrant over the palpable area and injected with 0.25% Marcaine with epinephrine.  Skin flaps were raised and 2-3 cm of fatty tissue were taken all the way around the mass and the pectoralis fascia was not violated as the mass was not close to the pectoralis fascia.  This was removed with a very small ellipse of skin around the needle and then marked with Prolene sutures silk sutures and chromic sutures as to the marked margins.  It was placed into the x-ray breasts machine and confirmed that the clip was  in the center and there was adequate fatty tissue on all borders and it was sent fresh to the pathology department.  One single bleeding point was tied off with 3-0 chromic suture.  Bleeding points were coagulated with electrocautery.  A 19 Syrian Santy drain  was then placed into the lumpectomy and axillary incisions and brought out through the lateral chest wall along the border anterior to the latissimus dorsi muscle.  The drain was secured in place with interrupted 2-0 silk suture.  The wound was then closed in multiple layers with interrupted 2-0 chromic for the deep layers running subcuticular 4-0 Vicryl for the skin and Dermabond glue.  A dressing was placed around the Santy drain.  Patient woke in the operating room extubated and sent to the recovery room in stable condition.    EBL: None    SPECIMEN:    Four sentinel lymph nodes, sentinel lymph node specimen# 1 contained 2 blue radioactive lymph nodes. With a count of 24,128 and negative frozen section, and sentinel lymph node 2 With a count of 6963  positive for a single borderline 2.0 mm macro metastases and sentinel lymph node 3-non blue radioactive count 127 and negative on frozen section.    2.  Right breast Lumpectomy specimen  COMPLICATIONS:  None    Implants:  19 Syrian Santy drain  Dictating Provider  Alphonso Salgado MD  6/14/2022 11:52 AM         None known

## 2022-06-14 NOTE — PROGRESS NOTE ADULT - ASSESSMENT
8/6 notes from the week noted and the wounds are apparently healing well now. Hemoglobin was stable. none

## 2022-07-06 NOTE — PROCEDURE NOTE - PROCEDURE DATE TIME, MLM
24-Dec-2017 20:00
Detail Level: Detailed
Render Risk Assessment In Note?: no
Comment: Scar, hx of BCC nodular type. Treated with Bleo injections by Dr. Pedro. [id number].

## 2022-07-21 NOTE — PROGRESS NOTE ADULT - ASSESSMENT
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  Completed IV CTX and brief po abx at rehab; off all abx > 1 month  Poor wound healing prompting wound revision, spacer exchange and fusion ruthann as of 3/23  Multiple op specimens now with growth of S aureus, confirming new/secondary infection; in d/w Dr Bass, process extended into joint space/bone, spacer exchanged instead of proceeding with revision    Plan:  will continue Vanco 1 g IV q 24 with today's random level < 15  Follow Creat  Await final Micro data, ?MRSA- likely will require another extended course of abx- anticipate PICC  Might add Rifampin, although this could make use of Coumadin more difficult  D/w pt and  at length- presence of secondary infection emphasized  D/w Dr Bass Cigarettes/Smokeless tobacco

## 2022-08-17 NOTE — PROGRESS NOTE ADULT - SUBJECTIVE AND OBJECTIVE BOX
Patient is a 77y old  Female who presents with a chief complaint of fever (11 Apr 2018 14:25)      SUBJECTIVE / OVERNIGHT EVENTS:    Patient seen and examined. denies cp sob. denies fevers, chills. no acute events.       Vital Signs Last 24 Hrs  T(C): 36.6 (20 Apr 2018 10:00), Max: 36.8 (19 Apr 2018 14:37)  T(F): 97.8 (20 Apr 2018 10:00), Max: 98.2 (19 Apr 2018 14:37)  HR: 85 (20 Apr 2018 10:00) (85 - 90)  BP: 133/74 (20 Apr 2018 10:00) (125/73 - 133/74)  BP(mean): --  RR: 18 (20 Apr 2018 10:00) (18 - 20)  SpO2: 98% (20 Apr 2018 10:00) (94% - 98%)  I&O's Summary    19 Apr 2018 07:01  -  20 Apr 2018 07:00  --------------------------------------------------------  IN: 600 mL / OUT: 300 mL / NET: 300 mL        PE:  GENERAL: NAD, AAOx3, obese  HEAD:  Atraumatic, Normocephalic  EYES: EOMI, PERRLA, conjunctiva and sclera clear  NECK: Supple  CHEST/LUNG: CTABL, No wheeze  HEART: Regular rate and rhythm; + murmur  ABDOMEN: Soft, Nontender, Nondistended; Bowel sounds present, eschar over pannus, some erythema  : lund  EXTREMITIES:  2+ Peripheral Pulses, right knee incision with staples, crusting, in splint, right thigh bullae with serosanguinous liquid output  NEURO: No focal deficits    LABS:                        6.9    16.44 )-----------( 350      ( 20 Apr 2018 07:57 )             22.0     04-20    135  |  99  |  8   ----------------------------<  98  3.7   |  26  |  0.65    Ca    8.2<L>      20 Apr 2018 06:37      PT/INR - ( 20 Apr 2018 08:07 )   PT: 30.3 sec;   INR: 2.63 ratio           CAPILLARY BLOOD GLUCOSE        CARDIAC MARKERS ( 19 Apr 2018 07:33 )  x     / x     / 14 U/L / x     / x              RADIOLOGY & ADDITIONAL TESTS:    Imaging Personally Reviewed:  [x] YES  [ ] NO    Consultant(s) Notes Reviewed:  [x] YES  [ ] NO    MEDICATIONS  (STANDING):  ascorbic acid 500 milliGRAM(s) Oral daily  aspirin enteric coated 81 milliGRAM(s) Oral daily  buDESOnide 160 MICROgram(s)/formoterol 4.5 MICROgram(s) Inhaler 2 Puff(s) Inhalation two times a day  calcium carbonate 1250 mG + Vitamin D (OsCal 500 + D) 1 Tablet(s) Oral three times a day  DAPTOmycin IVPB 800 milliGRAM(s) IV Intermittent every 24 hours  docusate sodium 100 milliGRAM(s) Oral three times a day  ferrous    sulfate 325 milliGRAM(s) Oral daily  folic acid 1 milliGRAM(s) Oral daily  melatonin 3 milliGRAM(s) Oral at bedtime  multivitamin 1 Tablet(s) Oral daily  nystatin    Suspension 844017 Unit(s) Oral every 6 hours  pantoprazole    Tablet 40 milliGRAM(s) Oral before breakfast  polyethylene glycol 3350 17 Gram(s) Oral daily  povidone iodine 10% Solution 1 Application(s) Topical three times a day  silver sulfADIAZINE 1% Cream 1 Application(s) Topical daily  sodium chloride 0.9%. 1000 milliLiter(s) (75 mL/Hr) IV Continuous <Continuous>  tiotropium 18 MICROgram(s) Capsule 1 Capsule(s) Inhalation daily    MEDICATIONS  (PRN):  acetaminophen   Tablet 650 milliGRAM(s) Oral every 6 hours PRN For Temp greater than 38 C (100.4 F)  acetaminophen   Tablet. 650 milliGRAM(s) Oral every 6 hours PRN Mild Pain (1 - 3)  HYDROmorphone  Injectable 0.5 milliGRAM(s) IV Push every 4 hours PRN Severe Pain (7 - 10)  lidocaine 2% Viscous 10 milliLiter(s) Swish and Spit every 6 hours PRN Mouth Sores  oxyCODONE    IR 5 milliGRAM(s) Oral every 4 hours PRN Moderate Pain (4 - 6)  senna 2 Tablet(s) Oral at bedtime PRN Constipation      Care Discussed with Consultants/Other Providers [x] YES  [ ] NO    HEALTH ISSUES - PROBLEM Dx:  Adjustment disorder, unspecified type  Delirium due to another medical condition  Pneumonia: Pneumonia  Aortic stenosis, severe: Aortic stenosis, severe  MYAH (obstructive sleep apnea): MYAH (obstructive sleep apnea)  Obesity: Obesity  Asthma: Asthma  SOB (shortness of breath): SOB (shortness of breath)  Chronic deep vein thrombosis (DVT) of lower extremity, unspecified laterality, unspecified vein: Chronic deep vein thrombosis (DVT) of lower extremity, unspecified laterality, unspecified vein  Arthralgia of knee, unspecified laterality: Arthralgia of knee, unspecified laterality  Hyperlipidemia, unspecified hyperlipidemia type: Hyperlipidemia, unspecified hyperlipidemia type  Asthma, unspecified asthma severity, unspecified whether complicated, unspecified whether persistent: Asthma, unspecified asthma severity, unspecified whether complicated, unspecified whether persistent  Coronary artery disease involving native coronary artery of native heart without angina pectoris: Coronary artery disease involving native coronary artery of native heart without angina pectoris  Gastroesophageal reflux disease, esophagitis presence not specified: Gastroesophageal reflux disease, esophagitis presence not specified  Fever, unspecified fever cause: Fever, unspecified fever cause 5-Fu Pregnancy And Lactation Text: This medication is Pregnancy Category X and contraindicated in pregnancy and in women who may become pregnant. It is unknown if this medication is excreted in breast milk.

## 2022-11-30 NOTE — PROGRESS NOTE ADULT - SUBJECTIVE AND OBJECTIVE BOX
Plastic Surgery Progress Note    S: Patient seen and examined.  still in ED, admitted to medicine, being treated for UTI.    Vital Signs Last 24 Hrs  T(C): 37.5 (2018 06:35), Max: 39.7 (2018 19:48)  T(F): 99.5 (2018 06:35), Max: 103.5 (2018 19:48)  HR: 95 (2018 06:35) (81 - 102)  BP: 130/55 (2018 06:35) (100/52 - 133/58)  BP(mean): 78 (2018 01:13) (77 - 78)  RR: 18 (2018 06:35) (16 - 22)  SpO2: 98% (2018 06:35) (95% - 98%)  I&O's Detail      Physical Exam:  General: Awake and alert, mild distress  Extrem: RLE wound not significantly changed from prior exam (); area of eschar stable, slightly larger area of demarcation, without underlying fluid collection, incision intact; unable to express purulence. Wound is not malodorous. Wound is tender along medial patella with some mild erythema.    Proximal to surgical site, at right inguinal crease/proximal thigh, there is an area of blanching erythema with significant tenderness and induration along the anterior thigh measuring 30cm x 16cm. No obvious abrasion or puncture site within area of erythema; serous blister noted along medial thigh extending posteriorly.          .  LABS:                         8.7    13.9  )-----------( 227      ( 2018 19:56 )             26.6     -    132<L>  |  95<L>  |  19  ----------------------------<  124<H>  4.7   |  23  |  0.94    Ca    8.3<L>      2018 19:56    TPro  7.2  /  Alb  1.6<L>  /  TBili  0.3  /  DBili  x   /  AST  34  /  ALT  19  /  AlkPhos  153<H>  04-08    PT/INR - ( 2018 19:56 )   PT: 68.6 sec;   INR: 6.07 ratio         PTT - ( 2018 19:56 )  PTT:56.1 sec  Urinalysis Basic - ( 2018 19:56 )    Color: Yellow / Appearance: SL Turbid / S.024 / pH: x  Gluc: x / Ketone: Negative  / Bili: Negative / Urobili: Negative   Blood: x / Protein: 100 mg/dL / Nitrite: Negative   Leuk Esterase: Moderate / RBC: 3-5 /HPF / WBC 11-25 /HPF   Sq Epi: x / Non Sq Epi: OCC /HPF / Bacteria: Few /HPF        Lactate, Blood: 0.9 mmol/L ( @ 06:38)    IMPRESSION:     Nonspecific stranding in the visualized right groin, thigh, knee and   proximal leg superficial soft tissue, which may represent edema. Recommend   clinical correlation to assess cellulitis.     Post surgical changes in bilateral knees as described. Small right > left   knee joint effusion with increased attenuation, which is difficult to assess   due to artifact. Recommend clinical question to assess underlying   infection/septic arthritis. Yes

## 2023-01-17 NOTE — PROVIDER CONTACT NOTE (CRITICAL VALUE NOTIFICATION) - NS PROVIDER READ BACK TO LAB
----- Message from Jeaneth Gray MD sent at 1/17/2023  3:49 PM EST -----  According to the blood test he is getting more thyroid medicine, change Unithroid to 1 tablet daily including Sunday, ask him not to take 2 tablets on Sunday yes

## 2023-01-19 NOTE — BEHAVIORAL HEALTH ASSESSMENT NOTE - JUDGMENT (REGARDING EVERYDAY EVENTS)
Plans for comfort measures    Problem: Nutrition/Hydration-ADULT  Goal: Nutrient/Hydration intake appropriate for improving, restoring or maintaining nutritional needs  Description: Monitor and assess patient's nutrition/hydration status for malnutrition  Collaborate with interdisciplinary team and initiate plan and interventions as ordered  Monitor patient's weight and dietary intake as ordered or per policy  Utilize nutrition screening tool and intervene as necessary  Determine patient's food preferences and provide high-protein, high-caloric foods as appropriate       INTERVENTIONS:  - Monitor oral intake, urinary output, labs, and treatment plans  - Assess nutrition and hydration status and recommend course of action  - Evaluate amount of meals eaten  - Assist patient with eating if necessary   - Allow adequate time for meals  - Recommend/ encourage appropriate diets, oral nutritional supplements, and vitamin/mineral supplements  - Order, calculate, and assess calorie counts as needed  - Recommend, monitor, and adjust tube feedings and TPN/PPN based on assessed needs  - Assess need for intravenous fluids  - Provide specific nutrition/hydration education as appropriate  - Include patient/family/caregiver in decisions related to nutrition  Outcome: Not Progressing Fair

## 2023-02-01 NOTE — DISCHARGE NOTE ADULT - NS AS DC VTE INSTRUCTION
No - the patient is unable to be screened due to medical condition
Coumadin/Warfarin - Compliance.../Coumadin/Warfarin - Dietary Advice.../Coumadin/Warfarin - Potential for adverse drug reactions and interactions/Coumadin/Warfarin - Follow-up monitoring...

## 2023-02-11 NOTE — ED ADULT NURSE NOTE - NSFALLRSKASSESSTYPE_ED_ALL_ED
02/11/23 0815   Pre Exercise Vitals   /89   Pulse 80   Supplemental O2? No   SpO2 97 %   During Exercise Vitals   Pulse 96   Supplemental O2? No   SpO2 (!) 88 %  (88-94% during ambulation)   Distance Walked 400 feet   Post Exercise Vitals   /87   Pulse 89   Supplemental O2? No   SpO2 94 %   Modality   Modality   (rollator)     Communicated with nurse prior to activity.   Min assist sit to stand, maintains sternal precautions.  Mild SAN noted. Brief standing rest breaks x2 to get 02 sat up to 90's. Encouraged increased activity and use of IS.     Initial (On Arrival)

## 2023-02-28 NOTE — H&P ADULT - PMH
RHEUMATOLOGY OUTPATIENT CLINIC NOTE    2/28/2023    Attending Rheumatologist: Juan Naranjo  Primary Care Provider: Effie Olmedo MD   Physician Requesting Consultation: Juan Dunaway, KATM  [address] [place] [address]  Chief Complaint/Reason For Consultation:  Foot Pain      Subjective:       HPI  Jessica Floyd is a 71 y.o. White female with medical history noted below who presents for evaluation of joint pain.   Patient presents for evaluation of joint pain. She notes chronic joint pain over many years with progression over the last year. She notes recent trauma to her left hand when trying to save her  from falling from a chair. She also notes paraesthesias of the right hand. She also notes her first episode of gout in her left toe. Unclear precipitant. No Hx of Stones. No FHX of Gout. She notes joint pain in her ankles, knees, back. She notes that activity precipitates the pain. Rest and Tylenol Helps. Occasional wrist swelling. Morning stiffness < 10 minutes. Has noted some wt loss since taking care of her 4yo grand daughter. Denies other systemic complaints.     Today  Patient here for follow up.   Last visit started on Gout management. She notes she has not had further gout attacks since starting med. Notes multiple ED visits: Nausea, Epistaxis (following Precancerous removal of nose lesion).      Review of Systems   Constitutional:  Negative for chills, fatigue, fever and unexpected weight change.   HENT:  Negative for mouth sores.    Eyes:  Negative for redness and eye dryness.   Respiratory:  Negative for cough and shortness of breath.    Cardiovascular:  Negative for chest pain.   Gastrointestinal:  Negative for abdominal distention, constipation, diarrhea, nausea and vomiting.   Genitourinary:  Negative for vaginal dryness.   Musculoskeletal:  Positive for arthralgias. Negative for back pain, gait problem, joint swelling, leg pain, myalgias, neck pain, neck stiffness and joint  Anxiety    Aortic stenosis, severe    CAD (coronary artery disease)  HERBERT to LAD 11/2016  Dysphagia    GERD (gastroesophageal reflux disease)    Hiatal hernia    Lumbar disc disease with radiculopathy    Macular degeneration    Morbid obesity with BMI of 50.0-59.9, adult    Osteoarthritis    Polymyalgia    Sciatica    Severe aortic stenosis by prior echocardiogram  2013 and  11/2016  Spider veins    Uncomplicated asthma, unspecified asthma severity deformity.   Integumentary:  Negative for rash.   Neurological:  Negative for weakness, numbness and headaches.   Hematological:  Negative for adenopathy. Does not bruise/bleed easily.   Psychiatric/Behavioral:  Negative for confusion, decreased concentration and sleep disturbance. The patient is not nervous/anxious.    All other systems reviewed and are negative.     Chronic comorbid conditions affecting medical decision making today:  Past Medical History:   Diagnosis Date    Allergic rhinitis     Amblyopia     Carotid stenosis     Coronary atherosclerosis     Outside [facility] 2014: 20% mLAD, 50% mCx, 20%, mRCA    Dyslipidemia     ESBL (extended spectrum beta-lactamase) producing bacteria infection     UTI    Hypertension     Nausea and vomiting 2017    Subarachnoid hemorrhage due to ruptured aneurysm      Past Surgical History:   Procedure Laterality Date    ADENOIDECTOMY      ANTERIOR CRUCIATE LIGAMENT REPAIR      APPENDECTOMY      CATARACT EXTRACTION W/  INTRAOCULAR LENS IMPLANT Right 2022    Procedure: EXTRACTION, CATARACT, WITH IOL INSERTION;  Surgeon: Higinio Cristina MD;  Location: [facility];  Service: Ophthalmology;  Laterality: Right;    CATARACT EXTRACTION W/  INTRAOCULAR LENS IMPLANT Left 2022    Procedure: EXTRACTION, CATARACT, WITH IOL INSERTION;  Surgeon: Higinio Cristina MD;  Location: [facility];  Service: Ophthalmology;  Laterality: Left;    CEREBRAL ANEURYSM REPAIR      clip     SECTION      DENTAL SURGERY      EYE SURGERY Bilateral     cataract removal and lens implants    TONSILLECTOMY       Family History   Problem Relation Age of Onset    Hypertension Mother     Aneurysm Mother     Hypertension Father     Alcohol abuse Father     Kidney disease Brother     Heart disease Brother     Gout Brother     No Known Problems Daughter     No Known Problems Daughter     No Known Problems Daughter      Social History     Substance and Sexual Activity   Alcohol Use Yes     Alcohol/week: 7.0 standard drinks    Types: 7 Glasses of wine per week     Social History     Tobacco Use   Smoking Status Former    Packs/day: 0.50    Years: 20.00    Pack years: 10.00    Types: Cigarettes    Quit date: 1999    Years since quittin.1   Smokeless Tobacco Never     Social History     Substance and Sexual Activity   Drug Use No       Current Outpatient Medications:     amLODIPine (NORVASC) 10 MG tablet, TAKE 1 TABLET BY MOUTH EVERY DAY, Disp: 90 tablet, Rfl: 3    amoxicillin (AMOXIL) 500 MG capsule, TAKE 2 CAPSULES BY MOUTH NOW, THEN ONE CAPSULE THREE TIMES A DAY UNTIL COMPLETELY FINISHED, Disp: , Rfl:     atorvastatin (LIPITOR) 80 MG tablet, TAKE 1 TABLET BY MOUTH EVERY DAY, Disp: 90 tablet, Rfl: 3    carvediloL (COREG) 12.5 MG tablet, TAKE 1 TABLET BY MOUTH TWICE A DAY WITH MEALS, Disp: 180 tablet, Rfl: 3    fluticasone propionate (FLONASE) 50 mcg/actuation nasal spray, SPRAY ONCE INTO EACH NOSTRIL ONCE DAILY, Disp: 16 mL, Rfl: 3    hydroCHLOROthiazide (HYDRODIURIL) 25 MG tablet, TAKE 1 TABLET BY MOUTH EVERY DAY, Disp: 90 tablet, Rfl: 3    HYDROcodone-acetaminophen (NORCO) 5-325 mg per tablet, Take 1 tablet by mouth every 6 (six) hours as needed for Pain., Disp: 12 tablet, Rfl: 0    hydrocortisone 2.5 % ointment, as needed., Disp: , Rfl:     ibuprofen (ADVIL,MOTRIN) 600 MG tablet, Take 1 tablet (600 mg total) by mouth every 6 (six) hours as needed for Pain., Disp: 20 tablet, Rfl: 0    ketoconazole (NIZORAL) 2 % cream, APPLY BETWEEN TOES TWICE DAILY X 2 WKS, Disp: , Rfl:     LIDOcaine (LIDODERM) 5 %, Place 1 patch onto the skin once daily. Remove & Discard patch within 12 hours or as directed by MD, Disp: 15 patch, Rfl: 0    methocarbamoL (ROBAXIN) 750 MG Tab, Take 1 tablet (750 mg total) by mouth 3 (three) times daily., Disp: 10 tablet, Rfl: 0    ondansetron (ZOFRAN ODT) 4 MG TbDL, Take 1 tablet (4 mg total) by mouth every 8 (eight) hours as needed (Nausea)., Disp: 12 tablet, Rfl: 0     oxyCODONE-acetaminophen (PERCOCET) 5-325 mg per tablet, Take 1 tablet by mouth every 6 (six) hours as needed for Pain., Disp: 12 tablet, Rfl: 0    pantoprazole (PROTONIX) 40 MG tablet, TAKE 1 TABLET BY MOUTH EVERY DAY, Disp: 90 tablet, Rfl: 0    promethazine (PHENERGAN) 25 MG tablet, Take 1 tablet (25 mg total) by mouth every 6 (six) hours as needed for Nausea., Disp: 12 tablet, Rfl: 0    allopurinoL (ZYLOPRIM) 100 MG tablet, Take 0.5 tablets (50 mg total) by mouth once daily., Disp: 30 tablet, Rfl: 11    aspirin (ECOTRIN) 81 MG EC tablet, Take 1 tablet (81 mg total) by mouth once daily. (Patient not taking: Reported on 2/28/2023), Disp: , Rfl: 0    colchicine (MITIGARE) 0.6 mg Cap, Take 1 capsule (0.6 mg total) by mouth once daily., Disp: 30 capsule, Rfl: 11  No current facility-administered medications for this visit.    Facility-Administered Medications Ordered in Other Visits:     sodium hyaluronate (EUFLEXXA) 10 mg/mL(mw 2.4 -3.6 million) injection 20 mg, 20 mg, Intra-articular, , Armani Cai PA-C, 20 mg at 11/14/22 0830     Objective:         Vitals:    02/28/23 1046   BP: 135/73   Pulse: 91     Physical Exam   Musculoskeletal:      Comments: Can make fist, no synovitis  Right 1st CMC TTP  Heberden and malissa nodes  Knee crepitus  Negative ankle/MTP with hallux valgus right side  No tender points      Reviewed old and all outside pertinent medical records available.    All lab results personally reviewed and interpreted by me.  Lab Results   Component Value Date    WBC 9.46 02/19/2023    HGB 9.4 (L) 02/19/2023    HCT 29.3 (L) 02/19/2023     (H) 02/19/2023    MCH 32.1 (H) 02/19/2023    MCHC 32.1 02/19/2023    RDW 16.0 (H) 02/19/2023     02/19/2023    MPV 10.8 02/19/2023       Lab Results   Component Value Date     02/17/2023    K 2.9 (L) 02/17/2023    CL 97 02/17/2023    CO2 26 02/17/2023     02/17/2023    BUN 20 02/17/2023    CALCIUM 9.2 02/17/2023    PROT 6.9 10/11/2022     ALBUMIN 4.0 10/11/2022    BILITOT 0.8 10/11/2022    AST 31 10/11/2022    ALKPHOS 55 10/11/2022    ALT 25 10/11/2022       Lab Results   Component Value Date    COLORU Yellow 03/20/2017    APPEARANCEUA Clear 03/20/2017    SPECGRAV 1.010 03/20/2017    PHUR 6.0 03/20/2017    PROTEINUA Negative 03/20/2017    KETONESU Negative 03/20/2017    LEUKOCYTESUR Trace (A) 03/20/2017    NITRITE Negative 03/20/2017    UROBILINOGEN Negative 03/20/2017       Lab Results   Component Value Date    CRP 13.0 (H) 06/15/2022       Lab Results   Component Value Date    SEDRATE 46 (H) 06/15/2022       Lab Results   Component Value Date    RF <13.0 05/11/2022    SEDRATE 46 (H) 06/15/2022       No components found for: 25OHVITDTOT, 41MSJPOV4, 85ESKAKN1, METHODNOTE    Lab Results   Component Value Date    URICACID 6.5 (H) 10/11/2022       No components found for: TSPOTTB        Imaging:  All imaging reviewed and independently interpreted by me.         ASSESSMENT / PLAN:     Jessica Floyd is a 71 y.o. White female with:      1. Chronic gout without tophus, unspecified cause, unspecified site  - patient with recent Gout attack, +CKD3, no Stones or FHX, unknown triggers  - doing well   - recent labs reviewed, No UA, RF is stable  - at this moment continued current therapy  - continued Allopurinol 50mg and Colchicine 0.6mg PPX  - gout diet reinforced  - reassurance    2. Primary osteoarthritis involving multiple joints  - in addition to Gout she has OA  - stable  - [place] [facility] [place]   - Tylenol PRN  - discussed alternative therapies such as Epson salts, Paraffin Wax and Topical Voltaren  - reassurance and exercise     3. Stage 3a chronic kidney disease  - noted  - renally dosed meds     4. Encounter for long-term (current) use of other medications  - discussed medication SE and interactions  - recent labs reviewed     5. Other specified counseling  - over 10 minutes spent regarding below topics:  - Immunization counseling done.  -  Weight loss counseling done.  - Nutrition and exercise counseling.  - Limitation of alcohol consumption.  - Regular exercise:  Aerobic and resistance.  - Medication counseling provided.      Follow up in about 3 months (around 5/28/2023).    Method of contact with patient concerns: [facility]    Disclaimer:  This note is prepared using voice recognition software and as such is likely to have errors and has not been proof read. Please contact me for questions.     Time spent: 30 minutes in face to face discussion concerning diagnosis, prognosis, review of lab and test results, benefits of treatment as well as management of disease, counseling of patient and coordination of care between various health care providers.  Greater than half the time spent was used for coordination of care and counseling of patient.    Juan Naranjo M.D.  Rheumatology Department   [facility]

## 2023-03-08 NOTE — PROGRESS NOTE ADULT - PROBLEM SELECTOR PLAN 1
Detail Level: Zone Anticipated Starting Dosage (Optional): 40mg Daily multifactorial--asthma, obesity, CAD, prior PE, likely aspiration PNA--clearing--clinically better

## 2023-03-17 NOTE — PHYSICAL THERAPY INITIAL EVALUATION ADULT - ADDITIONAL COMMENTS
Department of Anesthesiology  Preprocedure Note       Name:  Ene Thayer   Age:  75 y.o.  :  1958                                          MRN:  [id number]         Date:  3/17/2023      Surgeon: Uriel Zamora):  Greg Deleon DO    Procedure: Procedure(s): HERNIA VENTRAL REPAIR LAPAROSCOPIC ROBOTIC    Medications prior to admission:   Prior to Admission medications    Medication Sig Start Date End Date Taking? Authorizing Provider   vitamin D (D3 5000) 125 MCG (5000 UT) CAPS capsule Take 1 capsule by mouth daily    Rose Marie Perez DO   aspirin EC 81 MG EC tablet Take 1 tablet by mouth daily 23   Rose Marie Perez DO       Current medications:    No current facility-administered medications for this encounter. Allergies:  No Known Allergies    Problem List:    Patient Active Problem List   Diagnosis Code    Ventral hernia K43.9    Tobacco abuse Z72.0    Near syncope R55    Need for hepatitis C screening test Z11.59    Anterior dislocation of left shoulder S43.015A    Lip mass K13.0    Encounter for perioperative consultation Z01.818    Coronary artery disease involving native coronary artery of native heart without angina pectoris I25.10       Past Medical History:        Diagnosis Date    Other ill-defined conditions(799.89)        Past Surgical History:        Procedure Laterality Date    UMBILICAL HERNIA REPAIR         Social History:    Social History     Tobacco Use    Smoking status: Every Day     Packs/day: 0.25     Years: 30.00     Pack years: 7.50     Types: Cigarettes    Smokeless tobacco: Never    Tobacco comments:     Began smoking age 13   Substance Use Topics    Alcohol use:  No                                Ready to quit: Not Answered  Counseling given: Not Answered  Tobacco comments: Began smoking age 13      Vital Signs (Current):   Vitals:    23 0924   Weight: 145 lb (65.8 kg)   Height: 5' 9\" (1.753 m) BP Readings from Last 3 Encounters:   03/14/23 112/88   03/02/23 132/80   02/24/23 110/73       NPO Status:                                                                                 BMI:   Wt Readings from Last 3 Encounters:   03/09/23 145 lb (65.8 kg)   03/14/23 143 lb 8 oz (65.1 kg)   03/02/23 142 lb (64.4 kg)     Body mass index is 21.41 kg/m². CBC: No results found for: WBC, RBC, HGB, HCT, MCV, RDW, PLT    CMP:   Lab Results   Component Value Date/Time     03/08/2023 08:44 AM    K 4.3 03/08/2023 08:44 AM     03/08/2023 08:44 AM    CO2 29 03/08/2023 08:44 AM    BUN 32 03/08/2023 08:44 AM    CREATININE 1.50 03/08/2023 08:44 AM    LABGLOM 51 03/08/2023 08:44 AM    GLUCOSE 94 03/08/2023 08:44 AM    PROT 6.7 03/08/2023 08:44 AM    CALCIUM 9.1 03/08/2023 08:44 AM    BILITOT 0.3 03/08/2023 08:44 AM    ALKPHOS 81 03/08/2023 08:44 AM    AST 15 03/08/2023 08:44 AM    ALT 22 03/08/2023 08:44 AM       POC Tests: No results for input(s): POCGLU, POCNA, POCK, POCCL, POCBUN, POCHEMO, POCHCT in the last 72 hours. Coags: No results found for: PROTIME, INR, APTT    HCG (If Applicable): No results found for: PREGTESTUR, PREGSERUM, HCG, HCGQUANT     ABGs: No results found for: PHART, PO2ART, CLX5IMS, DMN1BJP, BEART, O4FTCJKY     Type & Screen (If Applicable):  No results found for: LABABO, LABRH    Drug/Infectious Status (If Applicable):  Lab Results   Component Value Date/Time    HEPCAB Non Reactive 03/08/2023 08:44 AM       COVID-19 Screening (If Applicable): No results found for: COVID19        Anesthesia Evaluation  Patient summary reviewed  Airway: Mallampati: I  TM distance: >3 FB   Neck ROM: full  Mouth opening: > = 3 FB   Dental:    (+) poor dentition      Pulmonary: breath sounds clear to auscultation  (+) current smoker          Patient smoked on day of surgery.                  Cardiovascular:  Exercise tolerance: good (>4 METS),   (+) CAD: non-obstructive,         Rhythm: regular  Rate: normal                 ROS comment: 2018 LH-  Left ventricle:  Ejection fraction was 60%-65%. The LVEDP was measured at 19 mmHg. Left main was widely patent. Left anterior descending artery widely patent. Left circumflex artery widely patent. Right coronary artery had a mid lesion of approximately 60% stenosis and IFR measurement was done across this lesion and was found to be 1.0, which indicates that it was not hemodynamically significant. 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 Endo/Other:                     Abdominal:             Vascular: Other Findings:           Anesthesia Plan      general     ASA 3     (Aspirin 81 mg in preop since patient does not take regularly)  Induction: intravenous. MIPS: Postoperative opioids intended and Prophylactic antiemetics administered. Anesthetic plan and risks discussed with patient. Plan discussed with CRNA.                     Rosio Gillespie MD   3/17/2023 Pt. lives in a pvt apartment with their spouse. Pt. has no steps at home. Pt. was previously ambulating household distances without an AD and community distances with a rolling walker independently. Pt. was previously independent with ADLs. Pt. returned to bed at end of therapy session with all lines and tubes intact, call bell in reach and in NAD.

## 2023-05-16 NOTE — PROGRESS NOTE ADULT - SUBJECTIVE AND OBJECTIVE BOX
Patient is a 78y old  Female who presented with a chief complaint of Cellulitis (22 Mar 2019 11:07)      INTERVAL HPI/OVERNIGHT EVENTS:  no new events  taking PO with assistance, still overall poor PO intake  soft small brown BMs    MEDICATIONS  (STANDING):  ascorbic acid 500 milliGRAM(s) Oral daily  aspirin enteric coated 81 milliGRAM(s) Oral daily  atorvastatin 10 milliGRAM(s) Oral at bedtime  bisacodyl 5 milliGRAM(s) Oral at bedtime  calcium carbonate 1250 mG  + Vitamin D (OsCal 500 + D) 1 Tablet(s) Oral three times a day  cholecalciferol 1000 Unit(s) Oral daily  cycloSPORINE  (SandIMMUNE)  Solution 75 milliGRAM(s) Oral two times a day  DAPTOmycin IVPB 700 milliGRAM(s) IV Intermittent every 48 hours  dextrose 5% + sodium chloride 0.9%. 1000 milliLiter(s) (75 mL/Hr) IV Continuous <Continuous>  dronabinol 2.5 milliGRAM(s) Oral two times a day  escitalopram 5 milliGRAM(s) Oral daily  folic acid 1 milliGRAM(s) Oral daily  heparin  Injectable 5000 Unit(s) SubCutaneous every 8 hours  metoprolol tartrate 12.5 milliGRAM(s) Oral two times a day  multivitamin/minerals 1 Tablet(s) Oral daily  nystatin    Suspension [id number] Unit(s) Swish and Swallow three times a day  polyethylene glycol 3350 17 Gram(s) Oral daily  vitamin B complex with vitamin C 1 Tablet(s) Oral daily    MEDICATIONS  (PRN):  acetaminophen   Tablet .. 650 milliGRAM(s) Oral every 6 hours PRN Mild Pain (1 - 3)  acetaminophen   Tablet .. 650 milliGRAM(s) Oral every 6 hours PRN Temp greater or equal to 38C (100.4F)  melatonin 3 milliGRAM(s) Oral at bedtime PRN Insomnia  oxyCODONE    5 mG/acetaminophen 325 mG 1 Tablet(s) Oral every 6 hours PRN Severe Pain (7 - 10)      Allergies  amoxicillin (Other)    Intolerances  IV Contrast (Flushing (Skin); Nausea)      Review of Systems:  General:  +fatigue, lethargy. no fever  CV:  No pain, palpitations, +severe AS s/p TAVR  Resp:  +dysphagia, asthma  GI:  see HPI  :  dark urine +renal insufficiency  Muscle:  see HPI  Neuro:  +gen weakness  Psych:  +anxiety/depression +lethargy  Endocrine:  No polyuria, polydypsia, cold/heat intolerance  Heme:  see HPI  Skin:  see HPI +MRSA wounds    Vital Signs Last 24 Hrs  T(C): 36.9 (22 Mar 2019 07:00), Max: 36.9 (22 Mar 2019 00:03)  T(F): 98.5 (22 Mar 2019 07:00), Max: 98.5 (22 Mar 2019 00:03)  HR: 95 (22 Mar 2019 07:00) (87 - 95)  BP: 99/65 (22 Mar 2019 07:00) (99/65 - 126/66)  BP(mean): --  RR: 17 (22 Mar 2019 07:00) (16 - 17)  SpO2: 98% (22 Mar 2019 07:00) (98% - 98%)    PHYSICAL EXAM:  Constitutional: frail appearing morbidly obese female lethargic but responsive, aide at bedside +hypophonia  Neck: No JVD  Respiratory: decreased BS b/l +rhonchi  Cardiovascular: S1 and S2 +murmur, tachy  Gastrointestinal: obese soft well healed midline scar NT  Extremities +anasarca +multiple wounds and scars  Vascular: 2+ peripheral pulses  Neurological: responsive, drowsy but arousable  Psychiatric: depressed affect  Skin: multiple wounds with dressing, scars +anasarca    LABS:                        8.4    28.1  )-----------( 217      ( 22 Mar 2019 06:19 )             27.9     03-22    144  |  111<H>  |  68<H>  ----------------------------<  96  4.8   |  20<L>  |  1.81<H>    Ca    7.8<L>      22 Mar 2019 06:19        RADIOLOGY & ADDITIONAL TESTS: show

## 2023-05-16 NOTE — ED ADULT NURSE NOTE - DOES PATIENT HAVE ADVANCE DIRECTIVE
Anxiety   WHAT YOU NEED TO KNOW:   Anxiety is a condition that causes you to feel extremely worried or nervous  The feelings are so strong that they can cause problems with your daily activities or sleep  Anxiety may be triggered by something you fear, or it may happen without a cause  Family or work stress, smoking, caffeine, and alcohol can increase your risk for anxiety  Certain medicines or health conditions can also increase your risk  Anxiety can become a long-term condition if it is not managed or treated  DISCHARGE INSTRUCTIONS:   Call your local emergency number (911 in the [address] [facility]) if:   You have chest pain, tightness, or heaviness that may spread to your shoulders, arms, jaw, neck, or back  You feel like hurting yourself or someone else  Call your doctor if:   Your symptoms get worse or do not get better with treatment  Your anxiety keeps you from doing your regular daily activities  You have new symptoms since your last visit  You have questions or concerns about your condition or care  Medicines:   Medicines  may be given to help you feel more calm and relaxed, and decrease your symptoms  Take your medicine as directed  Contact your healthcare provider if you think your medicine is not helping or if you have side effects  Tell your provider if you are allergic to any medicine  Keep a list of the medicines, vitamins, and herbs you take  Include the amounts, and when and why you take them  Bring the list or the pill bottles to follow-up visits  Carry your medicine list with you in case of an emergency  Manage anxiety:   Talk to someone about your anxiety  Your healthcare provider may suggest counseling  Cognitive behavioral therapy can help you understand and change how you react to events that trigger your symptoms  You might feel more comfortable talking with a friend or family member about your anxiety  Choose someone you know will be supportive and encouraging      Find ways to relax  Activities such as exercise, meditation, or listening to music can help you relax  Spend time with friends, or do things you enjoy  Practice deep breathing  Deep breathing can help you relax when you feel anxious  Focus on taking slow, deep breaths several times a day, or during an anxiety attack  Breathe in through your nose and out through your mouth  Create a regular sleep routine  Regular sleep can help you feel calmer during the day  Go to sleep and wake up at the same times every day  Do not watch television or use the computer right before bed  Your room should be comfortable, dark, and quiet  Eat a variety of healthy foods  Healthy foods include fruits, vegetables, low-fat dairy products, lean meats, fish, whole-grain breads, and cooked beans  Healthy foods can help you feel less anxious and have more energy  Exercise regularly  Exercise can increase your energy level  Exercise may also lift your mood and help you sleep better  Your healthcare provider can help you create an exercise plan  Do not smoke  Nicotine and other chemicals in cigarettes and cigars can increase anxiety  Ask your healthcare provider for information if you currently smoke and need help to quit  E-cigarettes or smokeless tobacco still contain nicotine  Talk to your healthcare provider before you use these products  Do not have caffeine  Caffeine can make your symptoms worse  Do not have foods or drinks that are meant to increase your energy level  Limit or do not drink alcohol  Ask your healthcare provider if alcohol is safe for you  You may not be able to drink alcohol if you take certain anxiety or depression medicines  Limit alcohol to 1 drink per day if you are a woman  Limit alcohol to 2 drinks per day if you are a man  A drink of alcohol is 12 ounces of beer, 5 ounces of wine, or 1½ ounces of liquor  Do not use drugs  Drugs can make your anxiety worse   It can also make anxiety hard to manage  Talk to your healthcare provider if you use drugs and want help to quit  Follow up with your doctor within 2 weeks or as directed:  Write down your questions so you remember to ask them during your visits  © Copyright [employer] 2022 Information is for End User's use only and may not be sold, redistributed or otherwise used for commercial purposes  The above information is an  only  It is not intended as medical advice for individual conditions or treatments  Talk to your doctor, nurse or pharmacist before following any medical regimen to see if it is safe and effective for you  No

## 2023-05-22 NOTE — ED PROVIDER NOTE - NSTIMEPROVIDERCAREINITIATE_GEN_ER
Patient would like to be a new patient of Dr. Rascon's and/or referring provider her at this location. Was made aware of 's maternity leave and would like to know her options/discuss.    Please callback to confirm.   
Writer spoke with patient in regards to wishing to set up a new patient consultation. Writer informed patient about Dr. Rascon going on maternity leave as of 5/26, and that our soonest availability would be for August. Patient also does not have an active referral in system. Writer informed patient that she would need to obtain a referral from her PCP prior to scheduling an appt. Patient also given information about surrounding interventional pain management locations if she does not wish to wait til August. Patient currently walking into urgent care, and states she will call us back to schedule when her PCP puts in a referral.   
07-Jun-2018 11:45

## 2023-06-27 NOTE — PATIENT PROFILE ADULT. - ARE SIGNIFICANT INDICATORS COMPLETE.
"Kvng "Devonta" Chevalier was seen and treated in our emergency department on 6/27/2023.  He may return to work on 06/28/2023.       If you have any questions or concerns, please don't hesitate to call.      Stephan Bernard MD" Yes

## 2023-09-14 NOTE — PHYSICAL THERAPY INITIAL EVALUATION ADULT - IMPAIRMENTS FOUND, PT EVAL
integumentary integrity muscle strength/integumentary integrity Glycopyrrolate Pregnancy And Lactation Text: This medication is Pregnancy Category B and is considered safe during pregnancy. It is unknown if it is excreted breast milk.

## 2023-10-12 NOTE — PROGRESS NOTE ADULT - ASSESSMENT
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 with recent admission from (3/9/2018 to 6/6/2018) for TKR c/b joint infection s/p explantation with multiple wounds managed by plastic surgery who presents from rehab today for evaluation of fever, lethargy, and nausea at rehab found to be febrile here with evidence of appendicitis on CT abdomen, non-operative candidate,     # acute appendicitis  Completed Meropenem, off abx >3d   bcx and ucx ngtd, afebrile  ID c/s appreciated    # abd wound, multiple bl leg wounds  continue vac to abdomen, right thigh ant and posterior  vac changes M/W/F  appreciate wound care recs, orders written per wound care  cont NS irrigation, cavilon, medihoney  pain control  appreciate derm recs, unlikely PG,   fu new subq right thigh culture to ro atypical mycobacterium, ro infectious source  tissue culture grew coag neg staph which can be normal roopa  dw derm fellow : awaiting wound cx from rt thigh to r/o atypical mycobacteria-so far neg  pt developed new skin ulcers during this admission- derm suspect ?scratching  we will need to monitor and prevent if  that is the contributing to her wounds  rec: wrapping proximal thighs b/l, prevent ?scratching at nights    chronic minocycline    # Pain control  dilaudid2 po q4 prn, 0.5 iv prior to dressing change  bowel regimen    # chr Anemia  stable, monitor h/h closely    # S/P TAVR (transcatheter aortic valve replacement)  continue Coumadin, daily INR    # adjustment disorder with depressed mood  appreciate psych re eval  xanax prn    plan of care dw patient independent

## 2023-11-20 NOTE — ED ADULT NURSE NOTE - RESPIRATORY RATE (BREATHS/MIN)
Per our conversation in Teams, I will let you start Compazine and follow-up with the patient's headaches. 16

## 2023-12-05 NOTE — ED ADULT TRIAGE NOTE - ADDITIONAL SAFETY/BANDS...
59F with hx of chronic lymphedema, T2DM, HTN, HLD. comes to [facility] for chronic wheeping lymphodema and left foot bullae. Patient last seen at [facility] in november 2023, DC to rehab, left rehab as she felt others were "mean" due to her leg odor, now homeless.  Tried to stay in a hotel and ambulance was called bringing her to [facility].  currently not working, getting social security, denies being on disability. Has an adult daughter whom she has a mother daughter home with, as well as patient grandson who is 6. Did stay with them for a few days recently,  however had disagreements with daughter and left. Patient denies any past psychiatric hx. Did report seeing a therapist when her parents  when she was a child. no hx of SA. No substance abuse reported.     12/5 - patient not sleeping would consider making qHS seroquel standing as this may help with overall anxiety and insomnia, OK for patient to refuse, not interested in inpt psych hospitalization and no si/hi currently. Amenable to going to rehab if indicated.     PLAN  - observation deferred to primary team  - patient offered psychiatric medication - declined - no Si or Hi, allowing for care  - is agreeable for medication for insomnia  - Begin Seroquel 25mg qhs, OK for patient to refuse  - EKG  - antipsychotics can only be given if qtc < 500   - PRN for mild agitation: Seroquel 25mg q6hrs. PRN for severe agitation Zyprexa 2.5mg q6hrs   - patient reports no dispo plan discussed with her - CL will follow 59F with hx of chronic lymphedema, T2DM, HTN, HLD. comes to [facility] for chronic wheeping lymphodema and left foot bullae. Patient last seen at [facility] in november 2023, DC to rehab, left rehab as she felt others were "mean" due to her leg odor, now homeless.  Tried to stay in a hotel and ambulance was called bringing her to [facility].  currently not working, getting social security, denies being on disability. Has an adult daughter whom she has a mother daughter home with, as well as patient grandson who is 6. Did stay with them for a few days recently,  however had disagreements with daughter and left. Patient denies any past psychiatric hx. Did report seeing a therapist when her parents  when she was a child. no hx of SA. No substance abuse reported.     12/5 - patient not sleeping would consider making qHS seroquel standing as this may help with overall anxiety and insomnia, OK for patient to refuse, not interested in inpt psych hospitalization and no si/hi currently. Amenable to going to rehab if indicated.     PLAN  - observation deferred to primary team  - patient offered psychiatric medication - declined - no Si or Hi, allowing for care  - is agreeable for medication for insomnia  - Begin Seroquel 25mg qhs, OK for patient to refuse  - EKG  - antipsychotics can only be given if qtc < 500   - PRN for mild agitation: Seroquel 25mg q6hrs. PRN for severe agitation Zyprexa 2.5mg q6hrs   - patient reports no dispo plan discussed with her - CL will follow Additional Safety/Bands: 59F with hx of chronic lymphedema, T2DM, HTN, HLD. comes to [facility] for chronic wheeping lymphodema and left foot bullae. Patient last seen at [facility] in november 2023, DC to rehab, left rehab as she felt others were "mean" due to her leg odor, now homeless.  Tried to stay in a hotel and ambulance was called bringing her to [facility].  currently not working, getting social security, denies being on disability. Has an adult daughter whom she has a mother daughter home with, as well as patient grandson who is 6. Did stay with them for a few days recently,  however had disagreements with daughter and left. Patient denies any past psychiatric hx. Did report seeing a therapist when her parents  when she was a child. no hx of SA. No substance abuse reported.     12/5 - patient not sleeping would consider making qHS seroquel standing as this may help with overall anxiety and insomnia, OK for patient to refuse, not interested in inpt psych hospitalization and no si/hi currently. Per collateral, no SI/HI or acute safety concerns however has chronically poor self care. Patient's mood issues and interpersonal conflicts are chronic in nature, unlikely to carlton with psychiatric hospitalization. Patient refuses voluntary psych admission when offered, does not meet criterion for involuntary psych admission. Amenable to going to rehab if indicated.     PLAN  - observation deferred to primary team  - patient offered psychiatric medication - declined - no Si or Hi, allowing for care  - is agreeable for medication for insomnia  - Begin Seroquel 25mg qhs, OK for patient to refuse  - EKG  - antipsychotics can only be given if qtc < 500   - PRN for mild agitation: Seroquel 25mg q6hrs. PRN for severe agitation Zyprexa 2.5mg q6hrs   - No psych c/i to dc to rehab or other safe dispo  59F with hx of chronic lymphedema, T2DM, HTN, HLD. comes to [facility] for chronic wheeping lymphodema and left foot bullae. Patient last seen at [facility] in november 2023, DC to rehab, left rehab as she felt others were "mean" due to her leg odor, now homeless.  Tried to stay in a hotel and ambulance was called bringing her to [facility].  currently not working, getting social security, denies being on disability. Has an adult daughter whom she has a mother daughter home with, as well as patient grandson who is 6. Did stay with them for a few days recently,  however had disagreements with daughter and left. Patient denies any past psychiatric hx. Did report seeing a therapist when her parents  when she was a child. no hx of SA. No substance abuse reported.     12/5 - patient not sleeping would consider making qHS seroquel standing as this may help with overall anxiety and insomnia, OK for patient to refuse, not interested in inpt psych hospitalization and no si/hi currently. Per collateral, no SI/HI or acute safety concerns however has chronically poor self care. Patient's mood issues and interpersonal conflicts are chronic in nature, unlikely to carlton with psychiatric hospitalization. Patient refuses voluntary psych admission when offered, does not meet criterion for involuntary psych admission. Amenable to going to rehab if indicated.     PLAN  - observation deferred to primary team  - patient offered psychiatric medication - declined - no Si or Hi, allowing for care  - is agreeable for medication for insomnia  - Begin Seroquel 25mg qhs, OK for patient to refuse  - EKG  - antipsychotics can only be given if qtc < 500   - PRN for mild agitation: Seroquel 25mg q6hrs. PRN for severe agitation Zyprexa 2.5mg q6hrs   - No psych c/i to dc to rehab or other safe dispo

## 2023-12-19 NOTE — PHYSICAL THERAPY INITIAL EVALUATION ADULT - THERAPY FREQUENCY, PT EVAL
Consent: The patient's consent was obtained including but not limited to risks of crusting, scabbing, blistering, scarring, darker or lighter pigmentary change, recurrence, incomplete removal and infection.
Post-Care Instructions: I reviewed with the patient in detail post-care instructions. Patient is to wear sunprotection, and avoid picking at any of the treated lesions. Pt may apply Vaseline to crusted or scabbing areas.
Price (Use Numbers Only, No Special Characters Or $): 0
2-3x/week
Detail Level: Detailed

## 2023-12-23 NOTE — PROGRESS NOTE ADULT - ATTENDING COMMENTS
Received the fax when sent to geriatrics , seems like our  fax is not working well. Placed fax to PCP mailbox and made a copy of order to be at .    Patient seen and examined   Wound abd right medial thigh and knee clean no sign of infection  Cont local wound care as above

## 2024-04-10 NOTE — ED ADULT NURSE NOTE - PAIN RATING/NUMBER SCALE (0-10): ACTIVITY
Preparing for Your Surgery  Getting started  A nurse will call you to review your health history and instructions. They will give you an arrival time based on your scheduled surgery time. Please be ready to share:  Your doctor's clinic name and phone number  Your medical, surgical, and anesthesia history  A list of allergies and sensitivities  A list of medicines, including herbal treatments and over-the-counter drugs  Whether the patient has a legal guardian (ask how to send us the papers in advance)  Please tell us if you're pregnant--or if there's any chance you might be pregnant. Some surgeries may injure a fetus (unborn baby), so they require a pregnancy test. Surgeries that are safe for a fetus don't always need a test, and you can choose whether to have one.   If you have a child who's having surgery, please ask for a copy of Preparing for Your Child's Surgery.    Preparing for surgery  Within 10 to 30 days of surgery: Have a pre-op exam (sometimes called an H&P, or History and Physical). This can be done at a clinic or pre-operative center.  If you're having a , you may not need this exam. Talk to your care team.  At your pre-op exam, talk to your care team about all medicines you take. If you need to stop any medicines before surgery, ask when to start taking them again.  We do this for your safety. Many medicines can make you bleed too much during surgery. Some change how well surgery (anesthesia) drugs work.  Call your insurance company to let them know you're having surgery. (If you don't have insurance, call [phone].)  Call your clinic if there's any change in your health. This includes signs of a cold or flu (sore throat, runny nose, cough, rash, fever). It also includes a scrape or scratch near the surgery site.  If you have questions on the day of surgery, call your hospital or surgery center.  Eating and drinking guidelines  For your safety: Unless your surgeon tells you otherwise,  follow the guidelines below.  Eat and drink as usual until 8 hours before you arrive for surgery. After that, no food or milk.  Drink clear liquids until 2 hours before you arrive. These are liquids you can see through, like water, Gatorade, and Propel Water. They also include plain black coffee and tea (no cream or milk), candy, and breath mints. You can spit out gum when you arrive.  If you drink alcohol: Stop drinking it the night before surgery.  If your care team tells you to take medicine on the morning of surgery, it's okay to take it with a sip of water.  Preventing infection  Shower or bathe the night before and morning of your surgery. Follow the instructions your clinic gave you. (If no instructions, use regular soap.)  Don't shave or clip hair near your surgery site. We'll remove the hair if needed.  Don't smoke or vape the morning of surgery. You may chew nicotine gum up to 2 hours before surgery. A nicotine patch is okay.  Note: Some surgeries require you to completely quit smoking and nicotine. Check with your surgeon.  Your care team will make every effort to keep you safe from infection. We will:  Clean our hands often with soap and water (or an alcohol-based hand rub).  Clean the skin at your surgery site with a special soap that kills germs.  Give you a special gown to keep you warm. (Cold raises the risk of infection.)  Wear special hair covers, masks, gowns and gloves during surgery.  Give antibiotic medicine, if prescribed. Not all surgeries need antibiotics.  What to bring on the day of surgery  Photo ID and insurance card  Copy of your health care directive, if you have one  Glasses and hearing aids (bring cases)  You can't wear contacts during surgery  Inhaler and eye drops, if you use them (tell us about these when you arrive)  CPAP machine or breathing device, if you use them  A few personal items, if spending the night  If you have . . .  A pacemaker, ICD (cardiac defibrillator) or other  implant: Bring the ID card.  An implanted stimulator: Bring the remote control.  A legal guardian: Bring a copy of the certified (court-stamped) guardianship papers.  Please remove any jewelry, including body piercings. Leave jewelry and other valuables at home.  If you're going home the day of surgery  You must have a responsible adult drive you home. They should stay with you overnight as well.  If you don't have someone to stay with you, and you aren't safe to go home alone, we may keep you overnight. Insurance often won't pay for this.  After surgery  If it's hard to control your pain or you need more pain medicine, please call your surgeon's office.  Questions?   If you have any questions for your care team, list them here: _________________________________________________________________________________________________________________________________________________________________________ ____________________________________ ____________________________________ ____________________________________  For informational purposes only. Not to replace the advice of your health care provider. Copyright   2003, 2019 [employer] [facility]. All rights reserved. Clinically reviewed by Lizeth Cantu MD. [employer] 469708 - REV 12/22.    How to Take Your Medication Before Surgery  - Take all of your medications before surgery as usual     3

## 2024-05-07 NOTE — PRE-OP CHECKLIST - BSA (M2)
[] [facility]  Outpatient Rehabilitation &  Therapy  [address]  [phone]  [phone] [] [facility]  Outpatient Rehabilitation &  Therapy  [address]  P: [phone]  F: [phone] [x] [facility]  Outpatient Rehabilitation &  Therapy  [address]  P: [phone]  F: [phone] [] [facility]  Outpatient Rehabilitation &  Therapy  [address]  [phone]  [phone]     Physical Therapy Daily Treatment Note    Date:  2024  Patient Name:  Facundo Louis    :  1960  MRN: [id number]  Physician: Tree Schmidt DPM                                    Insurance: [facility] MEDICARE ADV PPO, VISITS BASED ON MEDICAL NECESSITY, NO AUTH REQUIRED   Medical Diagnosis: Plantar fasciitis- right    Rehab Codes: M79.671  Onset date: 23             Next 's appt.: PRN  Visit# / total visits: 3/12    Cancels/No Shows: 0/0    Subjective: pt feels that overall she is feeling better. Prolonged sitting in the car prove to increase soreness but improves with time. Exercises and stretches going well. No issues following her previous visit.    Pain:  [x] Yes  [] No Location: R foot  Pain Rating: (0-10 scale) 1-2/10  Pain altered Tx:  [x] No  [] Yes  Action:  Comments:    Objective:        Today’s Treatment:  Modalities: Iontophoresis with dexamethasone sodium phosphate 2.5 ml 40 mAmin R PF - 10' , CP - 10'   Precautions:standard  Exercise: Reps/ Time Weight/ Level Comments         Soft tissue mobilization with LAX ball 2'       Standing gastroc stretch 3x30\"       Soleus stretch  3x30\"       Plantar fascia stretch with towel 5@10\"             T-band IV/EV 20x lime     Toe yoga 20x       Foot arching 20x                 MOBO rocks 20x ea   L foot on 2\" step   HR 20x               Other:  Manual: PA talocrural mob, MFR R calf - 6     Specific Instructions for next treatment:  talocrural mobs to gait DF, 
2.24

## 2024-05-20 NOTE — CONSULT NOTE ADULT - PROBLEM SELECTOR RECOMMENDATION 9
Adult ddx including pre renal vs atn  check ua   check urine na/cr/cl/osm/k  check renal ultrasound  trend vanco level   agree with PRBCs and then would start IV NACL @ 75cc/hr x 24 hours  check cxr  avoid NSAID, ACE, ARb, IV contrast  avoid hypotension  monitor urine output strictly

## 2024-08-01 NOTE — PROGRESS NOTE ADULT - PROBLEM SELECTOR PLAN 6
Patient seen for one time evaluation and treatment.  Patient did not return for further treatment and current status is unknown.  Please see initial evaluation for further information.   nutrition consult

## 2024-09-30 NOTE — PROGRESS NOTE ADULT - PROBLEM SELECTOR PROBLEM 1
Septic arthritis of knee, right
Pt encountered in radiology, +RUTH chair, awake/alert, on room air, fully cooperative with evaluation. Moderate dysarthria c/b low volume, slow rate, and impaired prosody.
Right knee pain
Septic arthritis of knee, right
Right knee pain

## 2024-10-04 NOTE — PROGRESS NOTE ADULT - ASSESSMENT
Transitional Planning:  Spoke with patient re: [facility].  Patient states he feels he can manage his new colostomy on his own.  He will see how the next day goes to see if he thinks he needs help.  Declines [facility] list at this time. Offered rehab resources for alcohol.  Patient declines.   Patient with infected right tkr s/p rudy, spacer for strept infection, had poor wound healing so returned 3 months after and had spacer exchange and complex wound closure with mrsa infection found. She is about 2 weeks into dapto and returns with fever, leukocytosis, ongoing pain and eschar of right knee wound and what appears to be a hematoma of the right thigh. INR has been up so that is good reason for the thigh findings. Must consider infected hematoma possible. UTI is possible. Pneumonia is possible given basilar rales. retroperitoneal hematoma possible .  PMR decompensation or adrenal insufficiency a consideration at well.    Pulmonary stable relative to prior admits --  wound care in progress  Completed IV ABX-5/5 -42 days  Debridement done-5/5  Vac placed 5/7-slow progress

## 2024-10-11 NOTE — H&P ADULT - PROBLEM SELECTOR PLAN 7
37.1 Start coumadin, INR slightly subtherapeutic at 1.8.   Will dose 2mg coumadin today and recheck INR in AM.

## 2024-10-18 NOTE — PROGRESS NOTE ADULT - SUBJECTIVE AND OBJECTIVE BOX
IRASEMA KOHLER  [id number]    Subjective:  Patient is a 77y old  Female who presents with a chief complaint of fever (11 Apr 2018 14:25). No acute events overnight, resting in bed       Objective:  T(C): 36.6 (05-01-18 @ 06:09), Max: 37.6 (04-30-18 @ 23:30)  HR: 99 (05-01-18 @ 06:09) (84 - 99)  BP: 122/78 (05-01-18 @ 06:09) (117/71 - 131/71)  RR: 18 (05-01-18 @ 06:09) (18 - 18)  SpO2: 93% (05-01-18 @ 06:09) (93% - 93%)  Wt(kg): --   04-30    137  |  98  |  9   ----------------------------<  100<H>  3.7   |  29  |  0.66    Ca    8.9      30 Apr 2018 06:06                          8.6    8.92  )-----------( 331      ( 30 Apr 2018 07:50 )             28.0       04-30 @ 07:01  -  05-01 @ 07:00  --------------------------------------------------------  IN: 600 mL / OUT: 0 mL / NET: 600 mL      PHYSICAL EXAM:    General: NAD, resting comfortably  MSK: Knee wound clean with eschar, groin wound with some devitalized tissue, umbilicus with exposed fat that is devitalized        MEDICATIONS  (STANDING):  ALPRAZolam 0.25 milliGRAM(s) Oral once  ascorbic acid 500 milliGRAM(s) Oral daily  aspirin enteric coated 81 milliGRAM(s) Oral daily  buDESOnide 160 MICROgram(s)/formoterol 4.5 MICROgram(s) Inhaler 2 Puff(s) Inhalation two times a day  calcium carbonate 1250 mG + Vitamin D (OsCal 500 + D) 1 Tablet(s) Oral three times a day  DAPTOmycin IVPB 800 milliGRAM(s) IV Intermittent every 24 hours  docusate sodium 100 milliGRAM(s) Oral three times a day  ferrous    sulfate 325 milliGRAM(s) Oral daily  folic acid 1 milliGRAM(s) Oral daily  melatonin 3 milliGRAM(s) Oral at bedtime  multivitamin 1 Tablet(s) Oral daily  nystatin    Suspension 201539 Unit(s) Oral every 6 hours  pantoprazole    Tablet 40 milliGRAM(s) Oral before breakfast  polyethylene glycol 3350 17 Gram(s) Oral daily  povidone iodine 10% Solution 1 Application(s) Topical three times a day  sodium chloride 0.9%. 1000 milliLiter(s) (75 mL/Hr) IV Continuous <Continuous>  tiotropium 18 MICROgram(s) Capsule 1 Capsule(s) Inhalation daily  zinc oxide 20% Ointment 1 Application(s) Topical daily    MEDICATIONS  (PRN):  acetaminophen   Tablet 650 milliGRAM(s) Oral every 6 hours PRN For Temp greater than 38 C (100.4 F)  acetaminophen   Tablet. 650 milliGRAM(s) Oral every 6 hours PRN Mild Pain (1 - 3)  HYDROmorphone  Injectable 0.5 milliGRAM(s) IV Push every 6 hours PRN Severe Pain (7 - 10)  lidocaine 2% Viscous 10 milliLiter(s) Swish and Spit every 6 hours PRN Mouth Sores  oxyCODONE    IR 2.5 milliGRAM(s) Oral every 4 hours PRN Moderate Pain (4 - 6)  oxyCODONE    IR 2.5 milliGRAM(s) Oral once PRN Moderate Pain (4 - 6)  senna 2 Tablet(s) Oral at bedtime PRN Constipation      Assessment/Plan:  Patient is a 77y old  Female who presents with a chief complaint of fever (11 Apr 2018 14:25). Will need continued local wound care, at this point would like dakins wet to dry to groin and umbilicus, saline wet to dry to knee  - Continued local wound care as per above  - Continue ICDs Unknown

## 2025-03-02 NOTE — PROVIDER CONTACT NOTE (CRITICAL VALUE NOTIFICATION) - TEST AND RESULT REPORTED:
Tissue culture of the knee, gram stain shows few poly morphic nuclear lucocytes per low power field. Numerous gram positive cocci in pairs power field no

## 2025-06-04 NOTE — OCCUPATIONAL THERAPY INITIAL EVALUATION ADULT - NAME OF CLINICIAN
Include Z78.9 (Other Specified Conditions Influencing Health Status) As An Associated Diagnosis?: No Anesthesia Volume In Cc: 2 Treatment Number (Will Not Render If 0): 0 Medical Necessity Information: It is in your best interest to select a reason for this procedure from the list below. All of these items fulfill various CMS LCD requirements except the new and changing color options. Post-Care Instructions: I reviewed with the patient in detail post-care instructions. Patient is to wear sunprotection, and avoid picking at any of the treated lesions. Pt may apply Vaseline to crusted or scabbing areas. Medical Necessity Clause: This procedure was medically necessary because the lesions that were treated were: Detail Level: Detailed Consent: The patient's consent was obtained including but not limited to risks of crusting, scabbing, blistering, scarring, darker or lighter pigmentary change, recurrence, incomplete removal and infection. JAS Stoner

## 2025-06-25 NOTE — DIETITIAN INITIAL EVALUATION ADULT. - NUTRITIONGOAL OUTCOME3
Patient Seen in: [facility]mediate [facility]        History  Chief Complaint   Patient presents with    Wound Care     Stated Complaint: check incision on arm    Subjective:   The history is provided by the patient.               55-year-old female with Past medical  history of breast cancer lymphedema surgery presents the immediate care for incision wound check.  Patient had a biopsy of her left upper arm on 06/12.  She had 2 biopsies performed at that time.  1 biopsy site has completely resolved and the other 1 is still slightly open.  There is some mild redness however admits this is improving.  Has been using Neosporin.  There is no drainage no pain.  No fevers.  No history of skin infections.       Objective:     Past Medical History:    Anxiety    Asthma (HCC)    Breast cancer (HCC)    Left breast    Bronchitis    Ductal carcinoma in situ of breast    Exposure to medical diagnostic radiation    2/24/2022    Hypertension    Migraines    Personal history of antineoplastic chemotherapy    last treatment 12/2021    PONV (postoperative nausea and vomiting)    Visual impairment    glasses for reading              Past Surgical History:   Procedure Laterality Date    Chemotherapy      Colonoscopy      Inject nerv blck,axillary nerv      Lumpectomy left Left 05/28/2021    [facility]    Yahaira localization wire 1 site right (cpt=19281) Right 06/01/2023    Stromal fibrosis    Other      sinus surgery x2    Other surgical history      Lymph node transfer    Radiation left      Reduction of large breast Right 06/2023    Reduction right      2023    Tonsillectomy      Us breast biopsy 1 site left (cpt=19083) Left 05/2021    [facility]                Social History     Socioeconomic History    Marital status: Single   Tobacco Use    Smoking status: Never    Smokeless tobacco: Never   Vaping Use    Vaping status: Never Used   Substance and Sexual Activity    Alcohol use: Yes     Comment: couple times per week    Drug use:  Never    Sexual activity: Not Currently     Social Drivers of Health     Food Insecurity: No Food Insecurity (6/26/2024)    Received from [facility]    Hunger Vital Sign     Worried About Running Out of Food in the Last Year: Never true     Ran Out of Food in the Last Year: Never true   Transportation Needs: No Transportation Needs (6/26/2024)    Received from [facility]    PRAPARE - Transportation     Lack of Transportation (Medical): No     Lack of Transportation (Non-Medical): No   Housing Stability: Low Risk  (6/26/2024)    Received from [facility]    Housing Stability Vital Sign     Unable to Pay for Housing in the Last Year: No     Number of Places Lived in the Last Year: 1     Unstable Housing in the Last Year: No              Review of Systems   Constitutional: Negative.    Skin:  Positive for wound.       Positive for stated complaint: check incision on arm  Other systems are as noted in HPI.  Constitutional and vital signs reviewed.      All other systems reviewed and negative except as noted above.                  Physical Exam    ED Triage Vitals [06/25/25 1011]   /85   Pulse 90   Resp 20   Temp 98.3 °F (36.8 °C)   Temp src Oral   SpO2 97 %   O2 Device None (Room air)       Current Vitals:   Vital Signs  BP: 130/85  Pulse: 90  Resp: 20  Temp: 98.3 °F (36.8 °C)  Temp src: Oral    Oxygen Therapy  SpO2: 97 %  O2 Device: None (Room air)            Physical Exam  Vitals and nursing note reviewed.   Constitutional:       General: She is not in acute distress.     Appearance: Normal appearance.   Pulmonary:      Effort: Pulmonary effort is normal.   Skin:     General: Skin is warm.      Comments: Left upper arm - 0.3 cm biopsy site with eschar noted in the middle.  There is no drainage no fluctuance.  Minimal surrounding erythema.  No streaking not warm to the touch.  Nontender.   Neurological:      Mental Status: She is alert.                  ED Course  Labs Reviewed - No data to display                         MDM  Ddx-biopsy at site with delayed healing, biopsy site with infection, abscess, cellulitis    On exam the patient is afebrile nontoxic she is in no acute distress.  Incision site is noted.  The eschar tissue noted in the center.  There is no drainage no fluctuance.  It is nontender not warm to the touch.  There is minimal surrounding erythema otherwise no streaking.  Clinical exam is consistent with a incision site healing with secondary intention.  Clinically no concern for infection at this time.  However will start on mupirocin ointment.  Discussed worsening signs and symptoms and when to be reevaluated.  Advised to follow-up with her plastic surgeon picture of the wound was placed in the chart for further comparison if symptoms progress or worsen.  All questions were answered and patient comfortable with treatment and discharge home        Medical Decision Making  Problems Addressed:  Incisional irritation, initial encounter: acute illness or injury    Risk  OTC drugs.  Prescription drug management.        Disposition and Plan     Clinical Impression:  1. Incisional irritation, initial encounter         Disposition:  Discharge  6/25/2025 10:28 am    Follow-up:  Camille Guzman MD  [address] [place] [address]  [phone]                Medications Prescribed:  Discharge Medication List as of 6/25/2025 10:31 AM        START taking these medications    Details   !! mupirocin 2 % External Ointment Apply 1 Application topically 2 (two) times daily for 14 days., Normal, Disp-15 g, R-0       !! - Potential duplicate medications found. Please discuss with provider.                Supplementary Documentation:                                                                  Pt to gradually/intentionally lose wt after d/c to healthy BMI

## 2025-07-21 NOTE — PROGRESS NOTE ADULT - SUBJECTIVE AND OBJECTIVE BOX
Patient is a 77y old  Female who presents with a chief complaint of fever (25 Oct 2018 07:20)      SUBJECTIVE / OVERNIGHT EVENTS:  No chest pain. No shortness of breath. No complaints. No events overnight.     MEDICATIONS  (STANDING):  ascorbic acid 500 milliGRAM(s) Oral daily  aspirin enteric coated 81 milliGRAM(s) Oral daily  buDESOnide 160 MICROgram(s)/formoterol 4.5 MICROgram(s) Inhaler 2 Puff(s) Inhalation two times a day  calcium carbonate 1250 mG  + Vitamin D (OsCal 500 + D) 1 Tablet(s) Oral three times a day  cefepime   IVPB 2000 milliGRAM(s) IV Intermittent every 12 hours  folic acid 1 milliGRAM(s) Oral daily  gabapentin 300 milliGRAM(s) Oral three times a day  loratadine 10 milliGRAM(s) Oral daily  melatonin 3 milliGRAM(s) Oral at bedtime  metoprolol tartrate 12.5 milliGRAM(s) Oral two times a day  metroNIDAZOLE  IVPB 500 milliGRAM(s) IV Intermittent every 8 hours  metroNIDAZOLE  IVPB      multivitamin 1 Tablet(s) Oral daily  pantoprazole    Tablet 40 milliGRAM(s) Oral before breakfast  simvastatin 20 milliGRAM(s) Oral at bedtime  vancomycin  IVPB 1000 milliGRAM(s) IV Intermittent daily    MEDICATIONS  (PRN):  acetaminophen   Tablet .. 650 milliGRAM(s) Oral every 4 hours PRN Temp greater or equal to 38C (100.4F), Mild Pain (1 - 3)  polyethylene glycol 3350 17 Gram(s) Oral daily PRN Constipation  traMADol 25 milliGRAM(s) Oral three times a day PRN Moderate Pain (4 - 6)      Vital Signs Last 24 Hrs  T(C): 37.2 (25 Oct 2018 12:48), Max: 37.2 (25 Oct 2018 12:48)  T(F): 99 (25 Oct 2018 12:48), Max: 99 (25 Oct 2018 12:48)  HR: 83 (25 Oct 2018 12:48) (83 - 91)  BP: 145/82 (25 Oct 2018 12:48) (128/75 - 145/82)  BP(mean): --  RR: 18 (25 Oct 2018 12:48) (18 - 18)  SpO2: 95% (25 Oct 2018 12:48) (95% - 97%)  CAPILLARY BLOOD GLUCOSE        I&O's Summary    24 Oct 2018 07:01  -  25 Oct 2018 07:00  --------------------------------------------------------  IN: 2020 mL / OUT: 400 mL / NET: 1620 mL        PHYSICAL EXAM:  GENERAL: NAD, well-developed, obese  HEAD:  Atraumatic, Normocephalic  EYES: EOMI, PERRLA, conjunctiva and sclera clear  NECK: Supple, No JVD  CHEST/LUNG: Clear to auscultation bilaterally; No wheeze  HEART: Regular rate and rhythm; No murmurs, rubs, or gallops  ABDOMEN: Soft, Nontender, Nondistended; Bowel sounds present  EXTREMITIES:  2+ Peripheral Pulses, No clubbing, cyanosis, or edema  PSYCH: AAOx3  NEUROLOGY: non-focal, luann le weakness  SKIN: No rashes or lesions    LABS:                        8.1    8.63  )-----------( 312      ( 25 Oct 2018 09:15 )             26.7     10-25    141  |  105  |  10  ----------------------------<  101<H>  3.8   |  25  |  0.60    Ca    8.8      25 Oct 2018 07:09      PT/INR - ( 25 Oct 2018 09:17 )   PT: 35.1 sec;   INR: 3.04 ratio         PTT - ( 24 Oct 2018 07:54 )  PTT:45.2 sec          RADIOLOGY & ADDITIONAL TESTS:    Imaging Personally Reviewed:    Consultant(s) Notes Reviewed:      Care Discussed with Consultants/Other Providers: Negative

## 2025-07-29 NOTE — PROGRESS NOTE ADULT - PROBLEM SELECTOR PLAN 6
Four drops in left ear twice a day   Mild; appears euvolemic  - Cards recs appreciated  - C/w current regiment